# Patient Record
Sex: MALE | Race: WHITE | Employment: OTHER | URBAN - METROPOLITAN AREA
[De-identification: names, ages, dates, MRNs, and addresses within clinical notes are randomized per-mention and may not be internally consistent; named-entity substitution may affect disease eponyms.]

---

## 2017-05-26 PROBLEM — M54.50 ACUTE LOW BACK PAIN WITHOUT SCIATICA: Status: ACTIVE | Noted: 2017-05-26

## 2017-06-06 ENCOUNTER — TELEPHONE (OUTPATIENT)
Dept: WOUND CARE | Age: 77
End: 2017-06-06

## 2017-08-16 ENCOUNTER — HOSPITAL ENCOUNTER (OUTPATIENT)
Dept: SURGERY | Age: 77
Discharge: HOME | End: 2017-08-16
Attending: INTERNAL MEDICINE | Admitting: INTERNAL MEDICINE

## 2017-08-16 VITALS
TEMPERATURE: 98.4 F | BODY MASS INDEX: 31.15 KG/M2 | OXYGEN SATURATION: 95 % | SYSTOLIC BLOOD PRESSURE: 114 MMHG | RESPIRATION RATE: 20 BRPM | HEIGHT: 72 IN | DIASTOLIC BLOOD PRESSURE: 75 MMHG | WEIGHT: 230 LBS | HEART RATE: 82 BPM

## 2017-08-16 RX ORDER — ASPIRIN 325 MG
325 TABLET ORAL NIGHTLY
Status: ON HOLD | COMMUNITY
End: 2020-05-06 | Stop reason: HOSPADM

## 2017-08-16 RX ORDER — NIACIN 500 MG
250 TABLET ORAL DAILY
COMMUNITY
End: 2019-01-03 | Stop reason: CLARIF

## 2017-08-16 RX ORDER — M-VIT,TX,IRON,MINS/CALC/FOLIC 27MG-0.4MG
1 TABLET ORAL DAILY
Status: ON HOLD | COMMUNITY
End: 2019-10-09 | Stop reason: HOSPADM

## 2017-08-24 ENCOUNTER — OFFICE VISIT (OUTPATIENT)
Dept: CARDIOLOGY CLINIC | Age: 77
End: 2017-08-24

## 2017-08-24 VITALS
BODY MASS INDEX: 31.63 KG/M2 | SYSTOLIC BLOOD PRESSURE: 114 MMHG | DIASTOLIC BLOOD PRESSURE: 88 MMHG | WEIGHT: 230 LBS | HEART RATE: 60 BPM

## 2017-08-24 DIAGNOSIS — Z95.1 S/P CABG X 5: ICD-10-CM

## 2017-08-24 DIAGNOSIS — I10 ESSENTIAL HYPERTENSION: Primary | Chronic | ICD-10-CM

## 2017-08-24 DIAGNOSIS — M25.551 ACUTE RIGHT HIP PAIN: ICD-10-CM

## 2017-08-24 PROCEDURE — 99214 OFFICE O/P EST MOD 30 MIN: CPT | Performed by: INTERNAL MEDICINE

## 2018-04-26 ENCOUNTER — OFFICE VISIT (OUTPATIENT)
Dept: CARDIOLOGY CLINIC | Age: 78
End: 2018-04-26

## 2018-04-26 VITALS
WEIGHT: 217 LBS | SYSTOLIC BLOOD PRESSURE: 120 MMHG | DIASTOLIC BLOOD PRESSURE: 70 MMHG | HEART RATE: 80 BPM | BODY MASS INDEX: 29.84 KG/M2

## 2018-04-26 DIAGNOSIS — Z95.1 S/P CABG X 5: ICD-10-CM

## 2018-04-26 DIAGNOSIS — I10 ESSENTIAL HYPERTENSION: Primary | Chronic | ICD-10-CM

## 2018-04-26 DIAGNOSIS — I25.10 CORONARY ARTERY DISEASE INVOLVING NATIVE CORONARY ARTERY WITHOUT ANGINA PECTORIS, UNSPECIFIED WHETHER NATIVE OR TRANSPLANTED HEART: ICD-10-CM

## 2018-04-26 PROCEDURE — 99213 OFFICE O/P EST LOW 20 MIN: CPT | Performed by: INTERNAL MEDICINE

## 2018-04-26 RX ORDER — ISOSORBIDE MONONITRATE 30 MG/1
30 TABLET, EXTENDED RELEASE ORAL DAILY
Qty: 30 TABLET | Refills: 3 | Status: SHIPPED | OUTPATIENT
Start: 2018-04-26 | End: 2018-07-03 | Stop reason: SDUPTHER

## 2018-07-03 DIAGNOSIS — Z95.1 S/P CABG X 5: ICD-10-CM

## 2018-07-03 DIAGNOSIS — I25.10 CORONARY ARTERY DISEASE INVOLVING NATIVE CORONARY ARTERY WITHOUT ANGINA PECTORIS, UNSPECIFIED WHETHER NATIVE OR TRANSPLANTED HEART: ICD-10-CM

## 2018-07-03 DIAGNOSIS — I10 ESSENTIAL HYPERTENSION: Chronic | ICD-10-CM

## 2018-07-05 RX ORDER — ISOSORBIDE MONONITRATE 30 MG/1
TABLET, EXTENDED RELEASE ORAL
Qty: 90 TABLET | Refills: 2 | Status: ON HOLD | OUTPATIENT
Start: 2018-07-05 | End: 2019-01-09

## 2018-08-24 ENCOUNTER — HOSPITAL ENCOUNTER (EMERGENCY)
Age: 78
Discharge: HOME | End: 2018-08-24
Attending: EMERGENCY MEDICINE
Payer: MEDICARE

## 2018-08-24 VITALS
SYSTOLIC BLOOD PRESSURE: 148 MMHG | WEIGHT: 203.8 LBS | DIASTOLIC BLOOD PRESSURE: 77 MMHG | RESPIRATION RATE: 15 BRPM | TEMPERATURE: 98 F | HEART RATE: 57 BPM | BODY MASS INDEX: 27.6 KG/M2 | HEIGHT: 72 IN | OXYGEN SATURATION: 97 %

## 2018-08-24 DIAGNOSIS — K59.00 CONSTIPATION, UNSPECIFIED CONSTIPATION TYPE: Primary | ICD-10-CM

## 2018-08-24 LAB
ANION GAP SERPL CALCULATED.3IONS-SCNC: 10 MMOL/L (ref 3–16)
BUN BLDV-MCNC: 18 MG/DL (ref 7–20)
CALCIUM SERPL-MCNC: 9 MG/DL (ref 8.3–10.6)
CHLORIDE BLD-SCNC: 108 MMOL/L (ref 99–110)
CO2: 27 MMOL/L (ref 21–32)
CREAT SERPL-MCNC: 0.9 MG/DL (ref 0.8–1.3)
GFR AFRICAN AMERICAN: >60
GFR NON-AFRICAN AMERICAN: >60
GLUCOSE BLD-MCNC: 113 MG/DL (ref 70–99)
POTASSIUM SERPL-SCNC: 4.4 MMOL/L (ref 3.5–5.1)
SODIUM BLD-SCNC: 145 MMOL/L (ref 136–145)

## 2018-08-24 PROCEDURE — 80048 BASIC METABOLIC PNL TOTAL CA: CPT

## 2018-08-24 PROCEDURE — 2500000003 HC RX 250 WO HCPCS: Performed by: EMERGENCY MEDICINE

## 2018-08-24 PROCEDURE — 99284 EMERGENCY DEPT VISIT MOD MDM: CPT

## 2018-08-24 RX ORDER — POLYETHYLENE GLYCOL 3350 17 G/17G
17 POWDER, FOR SOLUTION ORAL 3 TIMES DAILY
Qty: 1530 G | Refills: 0 | Status: SHIPPED | OUTPATIENT
Start: 2018-08-24 | End: 2018-09-23

## 2018-08-24 RX ADMIN — Medication 960 ML: at 19:46

## 2018-08-24 ASSESSMENT — ENCOUNTER SYMPTOMS
COUGH: 0
SHORTNESS OF BREATH: 0
DIARRHEA: 0
VOMITING: 0
CONSTIPATION: 1
NAUSEA: 0
RHINORRHEA: 0
BACK PAIN: 0
ABDOMINAL PAIN: 1

## 2018-08-24 ASSESSMENT — PAIN DESCRIPTION - PAIN TYPE: TYPE: ACUTE PAIN

## 2018-08-24 ASSESSMENT — PAIN SCALES - GENERAL: PAINLEVEL_OUTOF10: 3

## 2018-08-24 ASSESSMENT — PAIN DESCRIPTION - LOCATION: LOCATION: ABDOMEN

## 2018-08-24 NOTE — ED PROVIDER NOTES
rhythm, normal heart sounds and intact distal pulses. Exam reveals no gallop and no friction rub. No murmur heard. Pulmonary/Chest: Effort normal and breath sounds normal. No respiratory distress. He has no wheezes. He has no rales. Abdominal: Soft. He exhibits no distension. There is no tenderness. There is no rebound and no guarding. R baclofen pump in place   Musculoskeletal: Normal range of motion. He exhibits no edema. Neurological: He is alert and oriented to person, place, and time. No cranial nerve deficit. Skin: Skin is warm. No rash noted. He is not diaphoretic. Psychiatric: He has a normal mood and affect. His behavior is normal.   Nursing note and vitals reviewed. Diagnostic Results     EKG   N/A    RADIOLOGY:  No orders to display       LABS:   Results for orders placed or performed during the hospital encounter of 42/66/80   Basic Metabolic Panel   Result Value Ref Range    Sodium 145 136 - 145 mmol/L    Potassium 4.4 3.5 - 5.1 mmol/L    Chloride 108 99 - 110 mmol/L    CO2 27 21 - 32 mmol/L    Anion Gap 10 3 - 16    Glucose 113 (H) 70 - 99 mg/dL    BUN 18 7 - 20 mg/dL    CREATININE 0.9 0.8 - 1.3 mg/dL    GFR Non-African American >60 >60    GFR African American >60 >60    Calcium 9.0 8.3 - 10.6 mg/dL       ED BEDSIDE ULTRASOUND:  N/A    RECENT VITALS:  BP: (!) 148/77, Temp: 98 °F (36.7 °C), Pulse: 57, Resp: 15, SpO2: 97 %     Procedures     N/A    MEDICAL DECISION MAKING / ASSESSMENT / Linda Nadege is a 69 y.o. male with a history of CAD s/p CABG, HTN, HL, and B12 deficiency c/b spasticity s/p baclofen pump placement who presents with constipation. Symptoms and exam most consistent with constipation 2/2 known spacticity. Doubt SBO (denies prior surgery, minimal pain, no N/V). Doubt electrolyte abnormality. Plan: BMP, enema    ED Course     Nursing Notes, Past Medical Hx, Past Surgical Hx, Social Hx, Allergies, and Family Hx were reviewed.     The patient was given the

## 2018-08-24 NOTE — ED NOTES
Bed: B23-23  Expected date:   Expected time:   Means of arrival:   Comments:  [id number] BUNNY Matt RN  08/24/18 0120

## 2018-08-24 NOTE — ED TRIAGE NOTES
Pt presents to ED with c/o no BM for 8 days and abdominal swelling. Dressed in gown, VSS. Pt states that he has taken Miralax at home with no relief.

## 2018-08-25 NOTE — ED NOTES
Pt tolerated enema administration well. Pt now passing gas, bowel sounds hyperactive. No BM noted at this time.      Surekha Chaney RN  08/24/18 2006

## 2018-08-25 NOTE — ED NOTES
Patient prepared for and ready to be discharged. Dressed in clothes and given belongings. Patient discharged at this time in no acute distress after he verbalized understanding of discharge instructions. Reviewed medications, and when to return to the ED with patient. Encouraged follow up with PCP as needed  Patient wheeled to lobby, Family to take patient home.        Denis Benton RN  08/24/18 [id number]

## 2018-08-25 NOTE — ED NOTES
Pt's daughter here to take patient home. Pt cleaned and dressed. [facility] to call [facility] to cancel transport.      Cj Sotelo RN  08/24/18 5276

## 2018-11-06 ENCOUNTER — HOSPITAL ENCOUNTER (EMERGENCY)
Age: 78
Discharge: HOME | End: 2018-11-06
Attending: EMERGENCY MEDICINE
Payer: MEDICARE

## 2018-11-06 VITALS
RESPIRATION RATE: 18 BRPM | SYSTOLIC BLOOD PRESSURE: 135 MMHG | DIASTOLIC BLOOD PRESSURE: 77 MMHG | WEIGHT: 204 LBS | HEART RATE: 51 BPM | TEMPERATURE: 98.3 F | BODY MASS INDEX: 28.06 KG/M2 | OXYGEN SATURATION: 98 %

## 2018-11-06 DIAGNOSIS — R10.9 ABDOMINAL PAIN, UNSPECIFIED ABDOMINAL LOCATION: Primary | ICD-10-CM

## 2018-11-06 LAB
ALBUMIN SERPL-MCNC: 3.4 G/DL (ref 3.4–5)
ALP BLD-CCNC: 126 U/L (ref 40–129)
ALT SERPL-CCNC: 15 U/L (ref 10–40)
ANION GAP SERPL CALCULATED.3IONS-SCNC: 10 MMOL/L (ref 3–16)
AST SERPL-CCNC: 23 U/L (ref 15–37)
BASOPHILS ABSOLUTE: 0.1 K/UL (ref 0–0.2)
BASOPHILS RELATIVE PERCENT: 1.1 %
BILIRUB SERPL-MCNC: 0.7 MG/DL (ref 0–1)
BILIRUBIN DIRECT: <0.2 MG/DL (ref 0–0.3)
BILIRUBIN URINE: NEGATIVE MG/DL
BILIRUBIN, INDIRECT: NORMAL MG/DL (ref 0–1)
BLOOD, URINE: NEGATIVE
BUN BLDV-MCNC: 26 MG/DL (ref 7–20)
CALCIUM SERPL-MCNC: 9.2 MG/DL (ref 8.3–10.6)
CHLORIDE BLD-SCNC: 106 MMOL/L (ref 99–110)
CLARITY: ABNORMAL
CO2: 28 MMOL/L (ref 21–32)
COLOR: ABNORMAL
CREAT SERPL-MCNC: 0.9 MG/DL (ref 0.8–1.3)
EOSINOPHILS ABSOLUTE: 0.3 K/UL (ref 0–0.6)
EOSINOPHILS RELATIVE PERCENT: 5.7 %
GFR AFRICAN AMERICAN: >60
GFR NON-AFRICAN AMERICAN: >60
GLUCOSE BLD-MCNC: 96 MG/DL (ref 70–99)
GLUCOSE URINE: NEGATIVE MG/DL
HCT VFR BLD CALC: 42.1 % (ref 40.5–52.5)
HEMOGLOBIN: 13.8 G/DL (ref 13.5–17.5)
KETONES, URINE: NEGATIVE MG/DL
LEUKOCYTE ESTERASE, URINE: NEGATIVE
LIPASE: 35 U/L (ref 13–60)
LYMPHOCYTES ABSOLUTE: 1.1 K/UL (ref 1–5.1)
LYMPHOCYTES RELATIVE PERCENT: 20.6 %
MCH RBC QN AUTO: 32.5 PG (ref 26–34)
MCHC RBC AUTO-ENTMCNC: 32.7 G/DL (ref 31–36)
MCV RBC AUTO: 99.3 FL (ref 80–100)
MICROSCOPIC EXAMINATION: ABNORMAL
MONOCYTES ABSOLUTE: 0.4 K/UL (ref 0–1.3)
MONOCYTES RELATIVE PERCENT: 7.6 %
NEUTROPHILS ABSOLUTE: 3.6 K/UL (ref 1.7–7.7)
NEUTROPHILS RELATIVE PERCENT: 65 %
NITRITE, URINE: NEGATIVE
PDW BLD-RTO: 14.8 % (ref 12.4–15.4)
PH UA: 7.5
PLATELET # BLD: 142 K/UL (ref 135–450)
PMV BLD AUTO: 10.2 FL (ref 5–10.5)
POC OCCULT BLOOD STOOL: NEGATIVE
POTASSIUM REFLEX MAGNESIUM: 4.5 MMOL/L (ref 3.5–5.1)
PROTEIN UA: NEGATIVE MG/DL
RBC # BLD: 4.24 M/UL (ref 4.2–5.9)
SODIUM BLD-SCNC: 144 MMOL/L (ref 136–145)
SPECIFIC GRAVITY UA: 1.02
TOTAL PROTEIN: 7.1 G/DL (ref 6.4–8.2)
UROBILINOGEN, URINE: 4 E.U./DL
WBC # BLD: 5.5 K/UL (ref 4–11)

## 2018-11-06 PROCEDURE — 80076 HEPATIC FUNCTION PANEL: CPT

## 2018-11-06 PROCEDURE — 81003 URINALYSIS AUTO W/O SCOPE: CPT

## 2018-11-06 PROCEDURE — 85025 COMPLETE CBC W/AUTO DIFF WBC: CPT

## 2018-11-06 PROCEDURE — 99284 EMERGENCY DEPT VISIT MOD MDM: CPT

## 2018-11-06 PROCEDURE — 80048 BASIC METABOLIC PNL TOTAL CA: CPT

## 2018-11-06 PROCEDURE — 83690 ASSAY OF LIPASE: CPT

## 2018-11-06 PROCEDURE — 82272 OCCULT BLD FECES 1-3 TESTS: CPT

## 2018-11-06 ASSESSMENT — ENCOUNTER SYMPTOMS
NAUSEA: 0
ABDOMINAL DISTENTION: 1
ABDOMINAL PAIN: 1
DIARRHEA: 0
VOMITING: 0
CONSTIPATION: 0
SHORTNESS OF BREATH: 0

## 2018-11-06 ASSESSMENT — PAIN DESCRIPTION - LOCATION: LOCATION: ABDOMEN

## 2018-11-06 ASSESSMENT — PAIN DESCRIPTION - PAIN TYPE: TYPE: ACUTE PAIN

## 2018-11-06 ASSESSMENT — PAIN SCALES - GENERAL: PAINLEVEL_OUTOF10: 3

## 2019-01-01 ENCOUNTER — HOSPITAL ENCOUNTER (EMERGENCY)
Age: 79
Discharge: HOME | DRG: 690 | End: 2019-01-01
Attending: EMERGENCY MEDICINE
Payer: MEDICARE

## 2019-01-01 VITALS
SYSTOLIC BLOOD PRESSURE: 155 MMHG | TEMPERATURE: 97.8 F | HEART RATE: 74 BPM | DIASTOLIC BLOOD PRESSURE: 78 MMHG | HEIGHT: 71 IN | BODY MASS INDEX: 28.56 KG/M2 | WEIGHT: 204 LBS | OXYGEN SATURATION: 96 % | RESPIRATION RATE: 16 BRPM

## 2019-01-01 DIAGNOSIS — R31.0 GROSS HEMATURIA: Primary | ICD-10-CM

## 2019-01-01 LAB
BASOPHILS ABSOLUTE: 0 K/UL (ref 0–0.2)
BASOPHILS RELATIVE PERCENT: 0.3 %
BILIRUBIN URINE: ABNORMAL
BLOOD, URINE: ABNORMAL
CALCIUM IONIZED: 1.1 MMOL/L (ref 1.12–1.32)
CLARITY: ABNORMAL
CO2: 27 MMOL/L (ref 21–32)
COLOR: ABNORMAL
EOSINOPHILS ABSOLUTE: 0.3 K/UL (ref 0–0.6)
EOSINOPHILS RELATIVE PERCENT: 2.4 %
GFR AFRICAN AMERICAN: >60
GFR NON-AFRICAN AMERICAN: >60
GLUCOSE BLD-MCNC: 100 MG/DL (ref 70–99)
GLUCOSE URINE: ABNORMAL MG/DL
HCT VFR BLD CALC: 44.5 % (ref 40.5–52.5)
HEMOGLOBIN: 14.6 G/DL (ref 13.5–17.5)
INR BLD: 1.04 (ref 0.86–1.14)
KETONES, URINE: ABNORMAL MG/DL
LEUKOCYTE ESTERASE, URINE: ABNORMAL
LYMPHOCYTES ABSOLUTE: 1 K/UL (ref 1–5.1)
LYMPHOCYTES RELATIVE PERCENT: 9.9 %
MCH RBC QN AUTO: 32.6 PG (ref 26–34)
MCHC RBC AUTO-ENTMCNC: 32.7 G/DL (ref 31–36)
MCV RBC AUTO: 99.5 FL (ref 80–100)
MICROSCOPIC EXAMINATION: YES
MONOCYTES ABSOLUTE: 0.7 K/UL (ref 0–1.3)
MONOCYTES RELATIVE PERCENT: 6.6 %
NEUTROPHILS ABSOLUTE: 8.4 K/UL (ref 1.7–7.7)
NEUTROPHILS RELATIVE PERCENT: 80.8 %
NITRITE, URINE: ABNORMAL
PDW BLD-RTO: 14.2 % (ref 12.4–15.4)
PERFORMED ON: ABNORMAL
PH UA: ABNORMAL
PLATELET # BLD: 164 K/UL (ref 135–450)
PMV BLD AUTO: 9.3 FL (ref 5–10.5)
POC ANION GAP: 12 (ref 10–20)
POC BUN: 34 MG/DL (ref 7–18)
POC CHLORIDE: 101 MMOL/L (ref 99–110)
POC CREATININE: 1 MG/DL (ref 0.8–1.3)
POC POTASSIUM: 4.8 MMOL/L (ref 3.5–5.1)
POC SAMPLE TYPE: ABNORMAL
POC SODIUM: 140 MMOL/L (ref 136–145)
PROTEIN UA: ABNORMAL MG/DL
PROTHROMBIN TIME: 11.9 SEC (ref 9.8–13)
RBC # BLD: 4.47 M/UL (ref 4.2–5.9)
RBC UA: >100 /HPF (ref 0–2)
SPECIFIC GRAVITY UA: >=1.03
URINE TYPE: ABNORMAL
UROBILINOGEN, URINE: ABNORMAL E.U./DL
WBC # BLD: 10.4 K/UL (ref 4–11)
WBC UA: ABNORMAL /HPF (ref 0–5)

## 2019-01-01 PROCEDURE — 87077 CULTURE AEROBIC IDENTIFY: CPT

## 2019-01-01 PROCEDURE — 87086 URINE CULTURE/COLONY COUNT: CPT

## 2019-01-01 PROCEDURE — 87186 SC STD MICRODIL/AGAR DIL: CPT

## 2019-01-01 PROCEDURE — 80047 BASIC METABLC PNL IONIZED CA: CPT

## 2019-01-01 PROCEDURE — 6370000000 HC RX 637 (ALT 250 FOR IP): Performed by: EMERGENCY MEDICINE

## 2019-01-01 PROCEDURE — 85025 COMPLETE CBC W/AUTO DIFF WBC: CPT

## 2019-01-01 PROCEDURE — 81001 URINALYSIS AUTO W/SCOPE: CPT

## 2019-01-01 PROCEDURE — 85610 PROTHROMBIN TIME: CPT

## 2019-01-01 PROCEDURE — 99284 EMERGENCY DEPT VISIT MOD MDM: CPT

## 2019-01-01 RX ORDER — CEPHALEXIN 500 MG/1
500 CAPSULE ORAL 2 TIMES DAILY
Qty: 14 CAPSULE | Refills: 0 | Status: SHIPPED | OUTPATIENT
Start: 2019-01-01 | End: 2019-01-03 | Stop reason: CLARIF

## 2019-01-01 RX ORDER — CEPHALEXIN 500 MG/1
500 CAPSULE ORAL 2 TIMES DAILY
Qty: 14 CAPSULE | Refills: 0 | Status: SHIPPED | OUTPATIENT
Start: 2019-01-01 | End: 2019-01-01

## 2019-01-01 RX ORDER — CEPHALEXIN 500 MG/1
500 CAPSULE ORAL ONCE
Status: COMPLETED | OUTPATIENT
Start: 2019-01-01 | End: 2019-01-01

## 2019-01-01 RX ADMIN — CEPHALEXIN 500 MG: 500 CAPSULE ORAL at 03:41

## 2019-01-01 ASSESSMENT — ENCOUNTER SYMPTOMS
VOMITING: 0
EYES NEGATIVE: 1
SHORTNESS OF BREATH: 0
DIARRHEA: 0
ALLERGIC/IMMUNOLOGIC NEGATIVE: 1
BACK PAIN: 0
NAUSEA: 0
ABDOMINAL PAIN: 0
WHEEZING: 0

## 2019-01-03 ENCOUNTER — HOSPITAL ENCOUNTER (INPATIENT)
Age: 79
LOS: 8 days | Discharge: NURSING, REHAB OR LONG-TERM CARE | DRG: 690 | End: 2019-01-11
Attending: EMERGENCY MEDICINE | Admitting: INTERNAL MEDICINE
Payer: MEDICARE

## 2019-01-03 ENCOUNTER — APPOINTMENT (OUTPATIENT)
Dept: GENERAL RADIOLOGY | Age: 79
DRG: 690 | End: 2019-01-03
Payer: MEDICARE

## 2019-01-03 DIAGNOSIS — N39.0 URINARY TRACT INFECTION WITH HEMATURIA, SITE UNSPECIFIED: ICD-10-CM

## 2019-01-03 DIAGNOSIS — F51.01 PRIMARY INSOMNIA: ICD-10-CM

## 2019-01-03 DIAGNOSIS — I10 ESSENTIAL HYPERTENSION: Chronic | ICD-10-CM

## 2019-01-03 DIAGNOSIS — Z95.1 S/P CABG X 5: ICD-10-CM

## 2019-01-03 DIAGNOSIS — R31.9 URINARY TRACT INFECTION WITH HEMATURIA, SITE UNSPECIFIED: ICD-10-CM

## 2019-01-03 DIAGNOSIS — T68.XXXA HYPOTHERMIA, INITIAL ENCOUNTER: Primary | ICD-10-CM

## 2019-01-03 DIAGNOSIS — I25.10 CORONARY ARTERY DISEASE INVOLVING NATIVE CORONARY ARTERY WITHOUT ANGINA PECTORIS, UNSPECIFIED WHETHER NATIVE OR TRANSPLANTED HEART: ICD-10-CM

## 2019-01-03 LAB
ANION GAP SERPL CALCULATED.3IONS-SCNC: 12 MMOL/L (ref 3–16)
ATYPICAL LYMPHOCYTE RELATIVE PERCENT: 1 % (ref 0–6)
BACTERIA: ABNORMAL /HPF
BANDED NEUTROPHILS RELATIVE PERCENT: 2 % (ref 0–7)
BASE EXCESS VENOUS: -2 (ref -3–3)
BASOPHILS ABSOLUTE: 0 K/UL (ref 0–0.2)
BASOPHILS RELATIVE PERCENT: 0 %
BILIRUBIN URINE: ABNORMAL
BLOOD, URINE: ABNORMAL
BUN BLDV-MCNC: 44 MG/DL (ref 7–20)
CALCIUM SERPL-MCNC: 9.6 MG/DL (ref 8.3–10.6)
CHLORIDE BLD-SCNC: 99 MMOL/L (ref 99–110)
CLARITY: ABNORMAL
CO2: 24 MMOL/L (ref 21–32)
COLOR: ABNORMAL
CREAT SERPL-MCNC: 1.4 MG/DL (ref 0.8–1.3)
CRENATED RBC'S: ABNORMAL
EKG ATRIAL RATE: 61 BPM
EKG DIAGNOSIS: NORMAL
EKG P AXIS: 67 DEGREES
EKG P-R INTERVAL: 226 MS
EKG Q-T INTERVAL: 458 MS
EKG QRS DURATION: 98 MS
EKG QTC CALCULATION (BAZETT): 461 MS
EKG R AXIS: 32 DEGREES
EKG T AXIS: 50 DEGREES
EKG VENTRICULAR RATE: 61 BPM
EOSINOPHILS ABSOLUTE: 0 K/UL (ref 0–0.6)
EOSINOPHILS RELATIVE PERCENT: 0 %
GFR AFRICAN AMERICAN: 59
GFR NON-AFRICAN AMERICAN: 49
GLUCOSE BLD-MCNC: 102 MG/DL (ref 70–99)
GLUCOSE URINE: NEGATIVE MG/DL
HCO3 VENOUS: 24.8 MMOL/L (ref 23–29)
HCT VFR BLD CALC: 42.9 % (ref 40.5–52.5)
HEMOGLOBIN: 14.2 G/DL (ref 13.5–17.5)
KETONES, URINE: ABNORMAL MG/DL
LACTATE: 1.1 MMOL/L (ref 0.4–2)
LEUKOCYTE ESTERASE, URINE: ABNORMAL
LYMPHOCYTES ABSOLUTE: 0.1 K/UL (ref 1–5.1)
LYMPHOCYTES RELATIVE PERCENT: 0 %
MCH RBC QN AUTO: 32.7 PG (ref 26–34)
MCHC RBC AUTO-ENTMCNC: 33.1 G/DL (ref 31–36)
MCV RBC AUTO: 98.7 FL (ref 80–100)
MICROSCOPIC EXAMINATION: YES
MONOCYTES ABSOLUTE: 0.7 K/UL (ref 0–1.3)
MONOCYTES RELATIVE PERCENT: 5 %
MUCUS: ABNORMAL /LPF
MYELOCYTE PERCENT: 1 %
NEUTROPHILS ABSOLUTE: 12.9 K/UL (ref 1.7–7.7)
NEUTROPHILS RELATIVE PERCENT: 91 %
NITRITE, URINE: POSITIVE
O2 SAT, VEN: 42 %
ORGANISM: ABNORMAL
PCO2, VEN: 51.6 MM HG (ref 40–50)
PDW BLD-RTO: 13.9 % (ref 12.4–15.4)
PERFORMED ON: ABNORMAL
PH UA: >=9
PH VENOUS: 7.29 (ref 7.35–7.45)
PLATELET # BLD: 116 K/UL (ref 135–450)
PMV BLD AUTO: 9.3 FL (ref 5–10.5)
PO2, VEN: 27 MM HG
POC SAMPLE TYPE: ABNORMAL
POTASSIUM REFLEX MAGNESIUM: 4.2 MMOL/L (ref 3.5–5.1)
PROTEIN UA: >=300 MG/DL
RBC # BLD: 4.34 M/UL (ref 4.2–5.9)
RBC UA: >100 /HPF (ref 0–2)
SODIUM BLD-SCNC: 135 MMOL/L (ref 136–145)
SPECIFIC GRAVITY UA: 1.01
TCO2 CALC VENOUS: 26 MMOL/L
TOTAL CK: 730 U/L (ref 39–308)
URINE CULTURE, ROUTINE: ABNORMAL
URINE CULTURE, ROUTINE: ABNORMAL
URINE TYPE: ABNORMAL
UROBILINOGEN, URINE: 1 E.U./DL
WBC # BLD: 13.7 K/UL (ref 4–11)
WBC UA: >100 /HPF (ref 0–5)

## 2019-01-03 PROCEDURE — 96365 THER/PROPH/DIAG IV INF INIT: CPT

## 2019-01-03 PROCEDURE — 1200000000 HC SEMI PRIVATE

## 2019-01-03 PROCEDURE — 87040 BLOOD CULTURE FOR BACTERIA: CPT

## 2019-01-03 PROCEDURE — 2580000003 HC RX 258: Performed by: STUDENT IN AN ORGANIZED HEALTH CARE EDUCATION/TRAINING PROGRAM

## 2019-01-03 PROCEDURE — 93005 ELECTROCARDIOGRAM TRACING: CPT | Performed by: EMERGENCY MEDICINE

## 2019-01-03 PROCEDURE — 85025 COMPLETE CBC W/AUTO DIFF WBC: CPT

## 2019-01-03 PROCEDURE — 84153 ASSAY OF PSA TOTAL: CPT

## 2019-01-03 PROCEDURE — 84436 ASSAY OF TOTAL THYROXINE: CPT

## 2019-01-03 PROCEDURE — 80048 BASIC METABOLIC PNL TOTAL CA: CPT

## 2019-01-03 PROCEDURE — 84443 ASSAY THYROID STIM HORMONE: CPT

## 2019-01-03 PROCEDURE — 87186 SC STD MICRODIL/AGAR DIL: CPT

## 2019-01-03 PROCEDURE — 71045 X-RAY EXAM CHEST 1 VIEW: CPT

## 2019-01-03 PROCEDURE — 87086 URINE CULTURE/COLONY COUNT: CPT

## 2019-01-03 PROCEDURE — 82550 ASSAY OF CK (CPK): CPT

## 2019-01-03 PROCEDURE — 36415 COLL VENOUS BLD VENIPUNCTURE: CPT

## 2019-01-03 PROCEDURE — 81001 URINALYSIS AUTO W/SCOPE: CPT

## 2019-01-03 PROCEDURE — 96375 TX/PRO/DX INJ NEW DRUG ADDON: CPT

## 2019-01-03 PROCEDURE — 6360000002 HC RX W HCPCS: Performed by: STUDENT IN AN ORGANIZED HEALTH CARE EDUCATION/TRAINING PROGRAM

## 2019-01-03 PROCEDURE — 82803 BLOOD GASES ANY COMBINATION: CPT

## 2019-01-03 PROCEDURE — 6370000000 HC RX 637 (ALT 250 FOR IP): Performed by: STUDENT IN AN ORGANIZED HEALTH CARE EDUCATION/TRAINING PROGRAM

## 2019-01-03 PROCEDURE — 99285 EMERGENCY DEPT VISIT HI MDM: CPT

## 2019-01-03 PROCEDURE — 87077 CULTURE AEROBIC IDENTIFY: CPT

## 2019-01-03 PROCEDURE — 83605 ASSAY OF LACTIC ACID: CPT

## 2019-01-03 RX ORDER — GABAPENTIN 400 MG/1
400 CAPSULE ORAL
Status: DISCONTINUED | OUTPATIENT
Start: 2019-01-04 | End: 2019-01-11 | Stop reason: HOSPADM

## 2019-01-03 RX ORDER — 0.9 % SODIUM CHLORIDE 0.9 %
1000 INTRAVENOUS SOLUTION INTRAVENOUS ONCE
Status: COMPLETED | OUTPATIENT
Start: 2019-01-03 | End: 2019-01-03

## 2019-01-03 RX ORDER — BISMUTH SUBSALICYLATE 262 MG/1
1 TABLET, CHEWABLE ORAL EVERY 6 HOURS PRN
Status: DISCONTINUED | OUTPATIENT
Start: 2019-01-03 | End: 2019-01-11 | Stop reason: HOSPADM

## 2019-01-03 RX ORDER — SODIUM CHLORIDE 0.9 % (FLUSH) 0.9 %
10 SYRINGE (ML) INJECTION PRN
Status: DISCONTINUED | OUTPATIENT
Start: 2019-01-03 | End: 2019-01-11 | Stop reason: HOSPADM

## 2019-01-03 RX ORDER — FUROSEMIDE 20 MG/1
20 TABLET ORAL DAILY
COMMUNITY
End: 2019-12-17 | Stop reason: CLARIF

## 2019-01-03 RX ORDER — M-VIT,TX,IRON,MINS/CALC/FOLIC 27MG-0.4MG
1 TABLET ORAL DAILY
Status: DISCONTINUED | OUTPATIENT
Start: 2019-01-04 | End: 2019-01-11 | Stop reason: HOSPADM

## 2019-01-03 RX ORDER — PANTOPRAZOLE SODIUM 40 MG/1
40 GRANULE, DELAYED RELEASE ORAL
COMMUNITY
End: 2019-12-17 | Stop reason: CLARIF

## 2019-01-03 RX ORDER — ONDANSETRON 2 MG/ML
4 INJECTION INTRAMUSCULAR; INTRAVENOUS EVERY 6 HOURS PRN
Status: DISCONTINUED | OUTPATIENT
Start: 2019-01-03 | End: 2019-01-11 | Stop reason: HOSPADM

## 2019-01-03 RX ORDER — SENNA AND DOCUSATE SODIUM 50; 8.6 MG/1; MG/1
1 TABLET, FILM COATED ORAL DAILY PRN
COMMUNITY
End: 2019-12-17 | Stop reason: CLARIF

## 2019-01-03 RX ORDER — ONDANSETRON 4 MG/1
4 TABLET, ORALLY DISINTEGRATING ORAL EVERY 8 HOURS PRN
Status: DISCONTINUED | OUTPATIENT
Start: 2019-01-03 | End: 2019-01-03

## 2019-01-03 RX ORDER — GABAPENTIN 600 MG/1
600 TABLET ORAL NIGHTLY
Status: DISCONTINUED | OUTPATIENT
Start: 2019-01-03 | End: 2019-01-11 | Stop reason: HOSPADM

## 2019-01-03 RX ORDER — FERROUS SULFATE 325(65) MG
325 TABLET ORAL
Status: DISCONTINUED | OUTPATIENT
Start: 2019-01-04 | End: 2019-01-11 | Stop reason: HOSPADM

## 2019-01-03 RX ORDER — ONDANSETRON 4 MG/1
4 TABLET, ORALLY DISINTEGRATING ORAL EVERY 6 HOURS PRN
Status: DISCONTINUED | OUTPATIENT
Start: 2019-01-03 | End: 2019-01-11 | Stop reason: HOSPADM

## 2019-01-03 RX ORDER — FERROUS SULFATE 325(65) MG
325 TABLET ORAL
Status: ON HOLD | COMMUNITY
End: 2020-06-11 | Stop reason: HOSPADM

## 2019-01-03 RX ORDER — SENNA AND DOCUSATE SODIUM 50; 8.6 MG/1; MG/1
1 TABLET, FILM COATED ORAL DAILY PRN
Status: DISCONTINUED | OUTPATIENT
Start: 2019-01-03 | End: 2019-01-11 | Stop reason: HOSPADM

## 2019-01-03 RX ORDER — GABAPENTIN 600 MG/1
1800 TABLET ORAL NIGHTLY
Status: ON HOLD | COMMUNITY
End: 2020-02-13 | Stop reason: HOSPADM

## 2019-01-03 RX ORDER — CHLORAL HYDRATE 500 MG
3000 CAPSULE ORAL DAILY
COMMUNITY
End: 2019-01-03 | Stop reason: CLARIF

## 2019-01-03 RX ORDER — GABAPENTIN 300 MG/1
300 CAPSULE ORAL DAILY
Status: DISCONTINUED | OUTPATIENT
Start: 2019-01-04 | End: 2019-01-11 | Stop reason: HOSPADM

## 2019-01-03 RX ORDER — PHENOL 1.4 %
10 AEROSOL, SPRAY (ML) MUCOUS MEMBRANE NIGHTLY PRN
COMMUNITY
End: 2022-04-22

## 2019-01-03 RX ORDER — ONDANSETRON 2 MG/ML
4 INJECTION INTRAMUSCULAR; INTRAVENOUS EVERY 6 HOURS PRN
Status: DISCONTINUED | OUTPATIENT
Start: 2019-01-03 | End: 2019-01-03

## 2019-01-03 RX ORDER — UREA 10 %
10 LOTION (ML) TOPICAL NIGHTLY
Status: DISCONTINUED | OUTPATIENT
Start: 2019-01-03 | End: 2019-01-11 | Stop reason: HOSPADM

## 2019-01-03 RX ORDER — FUROSEMIDE 20 MG/1
20 TABLET ORAL DAILY
Status: DISCONTINUED | OUTPATIENT
Start: 2019-01-04 | End: 2019-01-04

## 2019-01-03 RX ORDER — ISOSORBIDE MONONITRATE 30 MG/1
30 TABLET, EXTENDED RELEASE ORAL DAILY
Status: DISCONTINUED | OUTPATIENT
Start: 2019-01-04 | End: 2019-01-04

## 2019-01-03 RX ORDER — NIACIN 500 MG
1 TABLET ORAL DAILY PRN
Status: ON HOLD | COMMUNITY
End: 2020-08-18

## 2019-01-03 RX ORDER — SODIUM CHLORIDE 0.9 % (FLUSH) 0.9 %
10 SYRINGE (ML) INJECTION EVERY 12 HOURS SCHEDULED
Status: DISCONTINUED | OUTPATIENT
Start: 2019-01-03 | End: 2019-01-11 | Stop reason: HOSPADM

## 2019-01-03 RX ORDER — PANTOPRAZOLE SODIUM 40 MG/1
40 GRANULE, DELAYED RELEASE ORAL
Status: DISCONTINUED | OUTPATIENT
Start: 2019-01-04 | End: 2019-01-11 | Stop reason: HOSPADM

## 2019-01-03 RX ORDER — SODIUM CHLORIDE 9 MG/ML
INJECTION, SOLUTION INTRAVENOUS CONTINUOUS
Status: ACTIVE | OUTPATIENT
Start: 2019-01-03 | End: 2019-01-04

## 2019-01-03 RX ORDER — ATORVASTATIN CALCIUM 80 MG/1
80 TABLET, FILM COATED ORAL NIGHTLY
Status: DISCONTINUED | OUTPATIENT
Start: 2019-01-03 | End: 2019-01-11 | Stop reason: HOSPADM

## 2019-01-03 RX ORDER — METOPROLOL SUCCINATE 25 MG/1
12.5 TABLET, EXTENDED RELEASE ORAL DAILY
Status: DISCONTINUED | OUTPATIENT
Start: 2019-01-04 | End: 2019-01-11 | Stop reason: HOSPADM

## 2019-01-03 RX ORDER — GABAPENTIN 600 MG/1
1200 TABLET ORAL
Status: ON HOLD | COMMUNITY
End: 2020-02-13 | Stop reason: HOSPADM

## 2019-01-03 RX ADMIN — SODIUM CHLORIDE 1000 ML: 9 INJECTION, SOLUTION INTRAVENOUS at 18:30

## 2019-01-03 RX ADMIN — Medication 10 MG: at 23:50

## 2019-01-03 RX ADMIN — GABAPENTIN 600 MG: 600 TABLET, FILM COATED ORAL at 23:50

## 2019-01-03 RX ADMIN — CEFTRIAXONE 1 G: 1 INJECTION, POWDER, FOR SOLUTION INTRAMUSCULAR; INTRAVENOUS at 18:31

## 2019-01-03 RX ADMIN — Medication 10 ML: at 23:51

## 2019-01-03 RX ADMIN — HYDROCORTISONE SODIUM SUCCINATE 100 MG: 100 INJECTION, POWDER, FOR SOLUTION INTRAMUSCULAR; INTRAVENOUS at 18:10

## 2019-01-03 RX ADMIN — ATORVASTATIN CALCIUM 80 MG: 80 TABLET, FILM COATED ORAL at 23:50

## 2019-01-03 ASSESSMENT — PAIN DESCRIPTION - PROGRESSION: CLINICAL_PROGRESSION: NOT CHANGED

## 2019-01-03 ASSESSMENT — PAIN DESCRIPTION - ONSET: ONSET: ON-GOING

## 2019-01-03 ASSESSMENT — ENCOUNTER SYMPTOMS
COUGH: 0
VOMITING: 0
SORE THROAT: 0
ABDOMINAL PAIN: 0
NAUSEA: 0
SHORTNESS OF BREATH: 0
EYE PAIN: 0
BACK PAIN: 0

## 2019-01-03 ASSESSMENT — PAIN SCALES - GENERAL: PAINLEVEL_OUTOF10: 7

## 2019-01-03 ASSESSMENT — PAIN DESCRIPTION - FREQUENCY: FREQUENCY: CONTINUOUS

## 2019-01-03 ASSESSMENT — PAIN DESCRIPTION - PAIN TYPE: TYPE: CHRONIC PAIN

## 2019-01-03 ASSESSMENT — PAIN DESCRIPTION - ORIENTATION: ORIENTATION: LOWER

## 2019-01-03 ASSESSMENT — PAIN DESCRIPTION - DESCRIPTORS: DESCRIPTORS: BURNING

## 2019-01-03 ASSESSMENT — PAIN DESCRIPTION - LOCATION: LOCATION: BACK

## 2019-01-04 ENCOUNTER — APPOINTMENT (OUTPATIENT)
Dept: CT IMAGING | Age: 79
DRG: 690 | End: 2019-01-04
Payer: MEDICARE

## 2019-01-04 LAB
ANION GAP SERPL CALCULATED.3IONS-SCNC: 15 MMOL/L (ref 3–16)
BASOPHILS ABSOLUTE: 0 K/UL (ref 0–0.2)
BASOPHILS RELATIVE PERCENT: 0.1 %
BUN BLDV-MCNC: 49 MG/DL (ref 7–20)
CALCIUM SERPL-MCNC: 9.2 MG/DL (ref 8.3–10.6)
CHLORIDE BLD-SCNC: 104 MMOL/L (ref 99–110)
CO2: 21 MMOL/L (ref 21–32)
CREAT SERPL-MCNC: 1.6 MG/DL (ref 0.8–1.3)
EOSINOPHILS ABSOLUTE: 0 K/UL (ref 0–0.6)
EOSINOPHILS RELATIVE PERCENT: 0.2 %
GFR AFRICAN AMERICAN: 51
GFR NON-AFRICAN AMERICAN: 42
GLUCOSE BLD-MCNC: 117 MG/DL (ref 70–99)
HCT VFR BLD CALC: 42.6 % (ref 40.5–52.5)
HEMOGLOBIN: 13.2 G/DL (ref 13.5–17.5)
LYMPHOCYTES ABSOLUTE: 0.7 K/UL (ref 1–5.1)
LYMPHOCYTES RELATIVE PERCENT: 4.8 %
MCH RBC QN AUTO: 33.1 PG (ref 26–34)
MCHC RBC AUTO-ENTMCNC: 31 G/DL (ref 31–36)
MCV RBC AUTO: 106.5 FL (ref 80–100)
MONOCYTES ABSOLUTE: 1.1 K/UL (ref 0–1.3)
MONOCYTES RELATIVE PERCENT: 8.1 %
NEUTROPHILS ABSOLUTE: 12.3 K/UL (ref 1.7–7.7)
NEUTROPHILS RELATIVE PERCENT: 86.8 %
PDW BLD-RTO: 15.2 % (ref 12.4–15.4)
PLATELET # BLD: 132 K/UL (ref 135–450)
PMV BLD AUTO: 9.3 FL (ref 5–10.5)
POTASSIUM REFLEX MAGNESIUM: 4.5 MMOL/L (ref 3.5–5.1)
PROSTATE SPECIFIC ANTIGEN: 13.89 NG/ML (ref 0–4)
RBC # BLD: 4 M/UL (ref 4.2–5.9)
SODIUM BLD-SCNC: 140 MMOL/L (ref 136–145)
T4 TOTAL: 6.9 UG/DL (ref 4.5–10.9)
TSH SERPL DL<=0.05 MIU/L-ACNC: 4.01 UIU/ML (ref 0.27–4.2)
WBC # BLD: 14.2 K/UL (ref 4–11)

## 2019-01-04 PROCEDURE — 6360000002 HC RX W HCPCS: Performed by: STUDENT IN AN ORGANIZED HEALTH CARE EDUCATION/TRAINING PROGRAM

## 2019-01-04 PROCEDURE — 85025 COMPLETE CBC W/AUTO DIFF WBC: CPT

## 2019-01-04 PROCEDURE — G8979 MOBILITY GOAL STATUS: HCPCS

## 2019-01-04 PROCEDURE — 51798 US URINE CAPACITY MEASURE: CPT

## 2019-01-04 PROCEDURE — 1200000000 HC SEMI PRIVATE

## 2019-01-04 PROCEDURE — 97535 SELF CARE MNGMENT TRAINING: CPT

## 2019-01-04 PROCEDURE — 74176 CT ABD & PELVIS W/O CONTRAST: CPT

## 2019-01-04 PROCEDURE — G8978 MOBILITY CURRENT STATUS: HCPCS

## 2019-01-04 PROCEDURE — 6360000002 HC RX W HCPCS: Performed by: INTERNAL MEDICINE

## 2019-01-04 PROCEDURE — G8988 SELF CARE GOAL STATUS: HCPCS

## 2019-01-04 PROCEDURE — 6370000000 HC RX 637 (ALT 250 FOR IP): Performed by: STUDENT IN AN ORGANIZED HEALTH CARE EDUCATION/TRAINING PROGRAM

## 2019-01-04 PROCEDURE — 97530 THERAPEUTIC ACTIVITIES: CPT

## 2019-01-04 PROCEDURE — 97161 PT EVAL LOW COMPLEX 20 MIN: CPT

## 2019-01-04 PROCEDURE — 2580000003 HC RX 258: Performed by: INTERNAL MEDICINE

## 2019-01-04 PROCEDURE — 97167 OT EVAL HIGH COMPLEX 60 MIN: CPT

## 2019-01-04 PROCEDURE — G8987 SELF CARE CURRENT STATUS: HCPCS

## 2019-01-04 PROCEDURE — 2580000003 HC RX 258: Performed by: STUDENT IN AN ORGANIZED HEALTH CARE EDUCATION/TRAINING PROGRAM

## 2019-01-04 PROCEDURE — 80048 BASIC METABOLIC PNL TOTAL CA: CPT

## 2019-01-04 PROCEDURE — 36415 COLL VENOUS BLD VENIPUNCTURE: CPT

## 2019-01-04 RX ORDER — ZOLPIDEM TARTRATE 5 MG/1
5 TABLET ORAL NIGHTLY PRN
Status: DISCONTINUED | OUTPATIENT
Start: 2019-01-04 | End: 2019-01-11 | Stop reason: HOSPADM

## 2019-01-04 RX ORDER — POLYETHYLENE GLYCOL 3350 17 G/17G
17 POWDER, FOR SOLUTION ORAL DAILY
Status: DISCONTINUED | OUTPATIENT
Start: 2019-01-04 | End: 2019-01-11 | Stop reason: HOSPADM

## 2019-01-04 RX ORDER — SODIUM CHLORIDE 9 MG/ML
INJECTION, SOLUTION INTRAVENOUS CONTINUOUS
Status: ACTIVE | OUTPATIENT
Start: 2019-01-04 | End: 2019-01-05

## 2019-01-04 RX ORDER — 0.9 % SODIUM CHLORIDE 0.9 %
500 INTRAVENOUS SOLUTION INTRAVENOUS ONCE
Status: COMPLETED | OUTPATIENT
Start: 2019-01-04 | End: 2019-01-04

## 2019-01-04 RX ADMIN — METOPROLOL SUCCINATE 12.5 MG: 25 TABLET, EXTENDED RELEASE ORAL at 07:51

## 2019-01-04 RX ADMIN — GABAPENTIN 600 MG: 600 TABLET, FILM COATED ORAL at 23:19

## 2019-01-04 RX ADMIN — ZOLPIDEM TARTRATE 5 MG: 5 TABLET ORAL at 23:19

## 2019-01-04 RX ADMIN — GABAPENTIN 300 MG: 300 CAPSULE ORAL at 07:51

## 2019-01-04 RX ADMIN — SODIUM CHLORIDE: 900 INJECTION, SOLUTION INTRAVENOUS at 16:45

## 2019-01-04 RX ADMIN — PANTOPRAZOLE SODIUM 40 MG: 40 GRANULE, DELAYED RELEASE ORAL at 07:51

## 2019-01-04 RX ADMIN — MULTIPLE VITAMINS W/ MINERALS TAB 1 TABLET: TAB at 07:51

## 2019-01-04 RX ADMIN — ISOSORBIDE MONONITRATE 30 MG: 30 TABLET, EXTENDED RELEASE ORAL at 07:51

## 2019-01-04 RX ADMIN — SODIUM CHLORIDE 500 ML: 9 INJECTION, SOLUTION INTRAVENOUS at 16:44

## 2019-01-04 RX ADMIN — FERROUS SULFATE TAB 325 MG (65 MG ELEMENTAL FE) 325 MG: 325 (65 FE) TAB at 07:52

## 2019-01-04 RX ADMIN — Medication 10 ML: at 23:20

## 2019-01-04 RX ADMIN — SODIUM CHLORIDE: 9 INJECTION, SOLUTION INTRAVENOUS at 01:07

## 2019-01-04 RX ADMIN — ENOXAPARIN SODIUM 40 MG: 40 INJECTION SUBCUTANEOUS at 07:51

## 2019-01-04 RX ADMIN — POLYETHYLENE GLYCOL (3350) 17 G: 17 POWDER, FOR SOLUTION ORAL at 08:01

## 2019-01-04 RX ADMIN — ATORVASTATIN CALCIUM 80 MG: 80 TABLET, FILM COATED ORAL at 23:19

## 2019-01-04 RX ADMIN — GABAPENTIN 400 MG: 400 CAPSULE ORAL at 11:23

## 2019-01-04 RX ADMIN — Medication 10 MG: at 23:20

## 2019-01-04 RX ADMIN — SODIUM CHLORIDE: 9 INJECTION, SOLUTION INTRAVENOUS at 07:52

## 2019-01-04 RX ADMIN — CEFTRIAXONE SODIUM 2 G: 2 INJECTION, POWDER, FOR SOLUTION INTRAMUSCULAR; INTRAVENOUS at 23:19

## 2019-01-04 ASSESSMENT — PAIN DESCRIPTION - PROGRESSION
CLINICAL_PROGRESSION: NOT CHANGED

## 2019-01-04 ASSESSMENT — PAIN SCALES - GENERAL
PAINLEVEL_OUTOF10: 0

## 2019-01-05 LAB
ANION GAP SERPL CALCULATED.3IONS-SCNC: 13 MMOL/L (ref 3–16)
BASOPHILS ABSOLUTE: 0.1 K/UL (ref 0–0.2)
BASOPHILS RELATIVE PERCENT: 0.6 %
BUN BLDV-MCNC: 45 MG/DL (ref 7–20)
CALCIUM SERPL-MCNC: 8.3 MG/DL (ref 8.3–10.6)
CHLORIDE BLD-SCNC: 106 MMOL/L (ref 99–110)
CO2: 20 MMOL/L (ref 21–32)
CREAT SERPL-MCNC: 1.2 MG/DL (ref 0.8–1.3)
EOSINOPHILS ABSOLUTE: 0.2 K/UL (ref 0–0.6)
EOSINOPHILS RELATIVE PERCENT: 2.1 %
GFR AFRICAN AMERICAN: >60
GFR NON-AFRICAN AMERICAN: 59
GLUCOSE BLD-MCNC: 118 MG/DL (ref 70–99)
HCT VFR BLD CALC: 33 % (ref 40.5–52.5)
HEMOGLOBIN: 11.1 G/DL (ref 13.5–17.5)
LYMPHOCYTES ABSOLUTE: 0.9 K/UL (ref 1–5.1)
LYMPHOCYTES RELATIVE PERCENT: 9.3 %
MCH RBC QN AUTO: 33.3 PG (ref 26–34)
MCHC RBC AUTO-ENTMCNC: 33.7 G/DL (ref 31–36)
MCV RBC AUTO: 98.6 FL (ref 80–100)
MONOCYTES ABSOLUTE: 1 K/UL (ref 0–1.3)
MONOCYTES RELATIVE PERCENT: 10.1 %
NEUTROPHILS ABSOLUTE: 7.6 K/UL (ref 1.7–7.7)
NEUTROPHILS RELATIVE PERCENT: 77.9 %
PDW BLD-RTO: 14.3 % (ref 12.4–15.4)
PLATELET # BLD: 139 K/UL (ref 135–450)
PMV BLD AUTO: 9.4 FL (ref 5–10.5)
POTASSIUM REFLEX MAGNESIUM: 4.4 MMOL/L (ref 3.5–5.1)
RBC # BLD: 3.35 M/UL (ref 4.2–5.9)
SODIUM BLD-SCNC: 139 MMOL/L (ref 136–145)
WBC # BLD: 9.8 K/UL (ref 4–11)

## 2019-01-05 PROCEDURE — 6370000000 HC RX 637 (ALT 250 FOR IP): Performed by: INTERNAL MEDICINE

## 2019-01-05 PROCEDURE — 2580000003 HC RX 258: Performed by: STUDENT IN AN ORGANIZED HEALTH CARE EDUCATION/TRAINING PROGRAM

## 2019-01-05 PROCEDURE — 2580000003 HC RX 258: Performed by: INTERNAL MEDICINE

## 2019-01-05 PROCEDURE — 85025 COMPLETE CBC W/AUTO DIFF WBC: CPT

## 2019-01-05 PROCEDURE — 80048 BASIC METABOLIC PNL TOTAL CA: CPT

## 2019-01-05 PROCEDURE — 51798 US URINE CAPACITY MEASURE: CPT

## 2019-01-05 PROCEDURE — 1200000000 HC SEMI PRIVATE

## 2019-01-05 PROCEDURE — 6360000002 HC RX W HCPCS: Performed by: STUDENT IN AN ORGANIZED HEALTH CARE EDUCATION/TRAINING PROGRAM

## 2019-01-05 PROCEDURE — 36415 COLL VENOUS BLD VENIPUNCTURE: CPT

## 2019-01-05 PROCEDURE — 6370000000 HC RX 637 (ALT 250 FOR IP): Performed by: STUDENT IN AN ORGANIZED HEALTH CARE EDUCATION/TRAINING PROGRAM

## 2019-01-05 PROCEDURE — 6360000002 HC RX W HCPCS: Performed by: INTERNAL MEDICINE

## 2019-01-05 RX ORDER — CIPROFLOXACIN 2 MG/ML
400 INJECTION, SOLUTION INTRAVENOUS EVERY 12 HOURS
Status: DISCONTINUED | OUTPATIENT
Start: 2019-01-05 | End: 2019-01-05

## 2019-01-05 RX ORDER — SENNA PLUS 8.6 MG/1
2 TABLET ORAL NIGHTLY
Status: DISCONTINUED | OUTPATIENT
Start: 2019-01-05 | End: 2019-01-11 | Stop reason: HOSPADM

## 2019-01-05 RX ORDER — SODIUM CHLORIDE 9 MG/ML
INJECTION, SOLUTION INTRAVENOUS CONTINUOUS
Status: DISCONTINUED | OUTPATIENT
Start: 2019-01-05 | End: 2019-01-08

## 2019-01-05 RX ADMIN — Medication 10 ML: at 21:46

## 2019-01-05 RX ADMIN — PIPERACILLIN SODIUM,TAZOBACTAM SODIUM 3.38 G: 3; .375 INJECTION, POWDER, FOR SOLUTION INTRAVENOUS at 16:11

## 2019-01-05 RX ADMIN — FERROUS SULFATE TAB 325 MG (65 MG ELEMENTAL FE) 325 MG: 325 (65 FE) TAB at 08:58

## 2019-01-05 RX ADMIN — POLYETHYLENE GLYCOL (3350) 17 G: 17 POWDER, FOR SOLUTION ORAL at 08:57

## 2019-01-05 RX ADMIN — SODIUM CHLORIDE: 900 INJECTION, SOLUTION INTRAVENOUS at 03:36

## 2019-01-05 RX ADMIN — GABAPENTIN 400 MG: 400 CAPSULE ORAL at 12:30

## 2019-01-05 RX ADMIN — SODIUM CHLORIDE: 9 INJECTION, SOLUTION INTRAVENOUS at 12:30

## 2019-01-05 RX ADMIN — ATORVASTATIN CALCIUM 80 MG: 80 TABLET, FILM COATED ORAL at 21:45

## 2019-01-05 RX ADMIN — METOPROLOL SUCCINATE 12.5 MG: 25 TABLET, EXTENDED RELEASE ORAL at 08:58

## 2019-01-05 RX ADMIN — PIPERACILLIN SODIUM,TAZOBACTAM SODIUM 3.38 G: 3; .375 INJECTION, POWDER, FOR SOLUTION INTRAVENOUS at 23:51

## 2019-01-05 RX ADMIN — CIPROFLOXACIN 400 MG: 2 INJECTION, SOLUTION INTRAVENOUS at 12:30

## 2019-01-05 RX ADMIN — STANDARDIZED SENNA CONCENTRATE 17.2 MG: 8.6 TABLET ORAL at 21:45

## 2019-01-05 RX ADMIN — GABAPENTIN 600 MG: 600 TABLET, FILM COATED ORAL at 21:48

## 2019-01-05 RX ADMIN — PANTOPRAZOLE SODIUM 40 MG: 40 GRANULE, DELAYED RELEASE ORAL at 06:14

## 2019-01-05 RX ADMIN — Medication 10 MG: at 21:45

## 2019-01-05 RX ADMIN — MULTIPLE VITAMINS W/ MINERALS TAB 1 TABLET: TAB at 08:58

## 2019-01-05 RX ADMIN — ZOLPIDEM TARTRATE 5 MG: 5 TABLET ORAL at 23:47

## 2019-01-05 RX ADMIN — GABAPENTIN 300 MG: 300 CAPSULE ORAL at 08:58

## 2019-01-05 RX ADMIN — ENOXAPARIN SODIUM 40 MG: 40 INJECTION SUBCUTANEOUS at 08:57

## 2019-01-05 ASSESSMENT — PAIN SCALES - GENERAL
PAINLEVEL_OUTOF10: 0

## 2019-01-06 LAB
ANION GAP SERPL CALCULATED.3IONS-SCNC: 12 MMOL/L (ref 3–16)
BASOPHILS ABSOLUTE: 0 K/UL (ref 0–0.2)
BASOPHILS RELATIVE PERCENT: 0.5 %
BUN BLDV-MCNC: 46 MG/DL (ref 7–20)
CALCIUM SERPL-MCNC: 8.2 MG/DL (ref 8.3–10.6)
CHLORIDE BLD-SCNC: 109 MMOL/L (ref 99–110)
CO2: 19 MMOL/L (ref 21–32)
CREAT SERPL-MCNC: 2.1 MG/DL (ref 0.8–1.3)
EOSINOPHILS ABSOLUTE: 0.3 K/UL (ref 0–0.6)
EOSINOPHILS RELATIVE PERCENT: 3.4 %
GFR AFRICAN AMERICAN: 37
GFR NON-AFRICAN AMERICAN: 31
GLUCOSE BLD-MCNC: 116 MG/DL (ref 70–99)
HCT VFR BLD CALC: 31.5 % (ref 40.5–52.5)
HEMOGLOBIN: 10.3 G/DL (ref 13.5–17.5)
LYMPHOCYTES ABSOLUTE: 0.9 K/UL (ref 1–5.1)
LYMPHOCYTES RELATIVE PERCENT: 10.6 %
MCH RBC QN AUTO: 32.8 PG (ref 26–34)
MCHC RBC AUTO-ENTMCNC: 32.7 G/DL (ref 31–36)
MCV RBC AUTO: 100.5 FL (ref 80–100)
MONOCYTES ABSOLUTE: 1 K/UL (ref 0–1.3)
MONOCYTES RELATIVE PERCENT: 12.9 %
NEUTROPHILS ABSOLUTE: 5.8 K/UL (ref 1.7–7.7)
NEUTROPHILS RELATIVE PERCENT: 72.6 %
ORGANISM: ABNORMAL
PDW BLD-RTO: 14.4 % (ref 12.4–15.4)
PLATELET # BLD: 127 K/UL (ref 135–450)
PMV BLD AUTO: 9.4 FL (ref 5–10.5)
POTASSIUM REFLEX MAGNESIUM: 4.6 MMOL/L (ref 3.5–5.1)
RBC # BLD: 3.14 M/UL (ref 4.2–5.9)
SODIUM BLD-SCNC: 140 MMOL/L (ref 136–145)
URINE CULTURE, ROUTINE: ABNORMAL
WBC # BLD: 8 K/UL (ref 4–11)

## 2019-01-06 PROCEDURE — 6370000000 HC RX 637 (ALT 250 FOR IP): Performed by: UROLOGY

## 2019-01-06 PROCEDURE — 80048 BASIC METABOLIC PNL TOTAL CA: CPT

## 2019-01-06 PROCEDURE — 36415 COLL VENOUS BLD VENIPUNCTURE: CPT

## 2019-01-06 PROCEDURE — 2580000003 HC RX 258: Performed by: INTERNAL MEDICINE

## 2019-01-06 PROCEDURE — 99221 1ST HOSP IP/OBS SF/LOW 40: CPT | Performed by: INTERNAL MEDICINE

## 2019-01-06 PROCEDURE — 85025 COMPLETE CBC W/AUTO DIFF WBC: CPT

## 2019-01-06 PROCEDURE — 1200000000 HC SEMI PRIVATE

## 2019-01-06 PROCEDURE — 6370000000 HC RX 637 (ALT 250 FOR IP): Performed by: STUDENT IN AN ORGANIZED HEALTH CARE EDUCATION/TRAINING PROGRAM

## 2019-01-06 PROCEDURE — 6360000002 HC RX W HCPCS: Performed by: STUDENT IN AN ORGANIZED HEALTH CARE EDUCATION/TRAINING PROGRAM

## 2019-01-06 PROCEDURE — 51798 US URINE CAPACITY MEASURE: CPT

## 2019-01-06 PROCEDURE — 6360000002 HC RX W HCPCS: Performed by: INTERNAL MEDICINE

## 2019-01-06 PROCEDURE — 2580000003 HC RX 258: Performed by: STUDENT IN AN ORGANIZED HEALTH CARE EDUCATION/TRAINING PROGRAM

## 2019-01-06 PROCEDURE — 6370000000 HC RX 637 (ALT 250 FOR IP): Performed by: INTERNAL MEDICINE

## 2019-01-06 RX ORDER — TAMSULOSIN HYDROCHLORIDE 0.4 MG/1
0.4 CAPSULE ORAL DAILY
Status: DISCONTINUED | OUTPATIENT
Start: 2019-01-06 | End: 2019-01-09

## 2019-01-06 RX ADMIN — STANDARDIZED SENNA CONCENTRATE 17.2 MG: 8.6 TABLET ORAL at 20:22

## 2019-01-06 RX ADMIN — ATORVASTATIN CALCIUM 80 MG: 80 TABLET, FILM COATED ORAL at 20:27

## 2019-01-06 RX ADMIN — Medication 10 ML: at 08:25

## 2019-01-06 RX ADMIN — MULTIPLE VITAMINS W/ MINERALS TAB 1 TABLET: TAB at 08:24

## 2019-01-06 RX ADMIN — POLYETHYLENE GLYCOL (3350) 17 G: 17 POWDER, FOR SOLUTION ORAL at 08:25

## 2019-01-06 RX ADMIN — ZOLPIDEM TARTRATE 5 MG: 5 TABLET ORAL at 22:45

## 2019-01-06 RX ADMIN — ENOXAPARIN SODIUM 40 MG: 40 INJECTION SUBCUTANEOUS at 08:25

## 2019-01-06 RX ADMIN — GABAPENTIN 300 MG: 300 CAPSULE ORAL at 08:24

## 2019-01-06 RX ADMIN — PIPERACILLIN SODIUM,TAZOBACTAM SODIUM 3.38 G: 3; .375 INJECTION, POWDER, FOR SOLUTION INTRAVENOUS at 16:11

## 2019-01-06 RX ADMIN — SODIUM CHLORIDE: 9 INJECTION, SOLUTION INTRAVENOUS at 12:35

## 2019-01-06 RX ADMIN — PANTOPRAZOLE SODIUM 40 MG: 40 GRANULE, DELAYED RELEASE ORAL at 06:41

## 2019-01-06 RX ADMIN — PIPERACILLIN SODIUM,TAZOBACTAM SODIUM 3.38 G: 3; .375 INJECTION, POWDER, FOR SOLUTION INTRAVENOUS at 06:41

## 2019-01-06 RX ADMIN — SODIUM CHLORIDE: 9 INJECTION, SOLUTION INTRAVENOUS at 22:45

## 2019-01-06 RX ADMIN — GABAPENTIN 400 MG: 400 CAPSULE ORAL at 12:35

## 2019-01-06 RX ADMIN — Medication 10 MG: at 20:23

## 2019-01-06 RX ADMIN — GABAPENTIN 600 MG: 600 TABLET, FILM COATED ORAL at 20:22

## 2019-01-06 RX ADMIN — TAMSULOSIN HYDROCHLORIDE 0.4 MG: 0.4 CAPSULE ORAL at 16:11

## 2019-01-06 RX ADMIN — FERROUS SULFATE TAB 325 MG (65 MG ELEMENTAL FE) 325 MG: 325 (65 FE) TAB at 08:24

## 2019-01-06 RX ADMIN — SODIUM CHLORIDE: 9 INJECTION, SOLUTION INTRAVENOUS at 02:10

## 2019-01-06 RX ADMIN — PIPERACILLIN SODIUM,TAZOBACTAM SODIUM 3.38 G: 3; .375 INJECTION, POWDER, FOR SOLUTION INTRAVENOUS at 22:43

## 2019-01-06 RX ADMIN — METOPROLOL SUCCINATE 12.5 MG: 25 TABLET, EXTENDED RELEASE ORAL at 08:24

## 2019-01-06 ASSESSMENT — PAIN SCALES - GENERAL
PAINLEVEL_OUTOF10: 0

## 2019-01-07 PROBLEM — N17.9 AKI (ACUTE KIDNEY INJURY) (HCC): Status: ACTIVE | Noted: 2019-01-07

## 2019-01-07 PROBLEM — N39.0 COMPLICATED UTI (URINARY TRACT INFECTION): Status: ACTIVE | Noted: 2019-01-07

## 2019-01-07 LAB
ANION GAP SERPL CALCULATED.3IONS-SCNC: 10 MMOL/L (ref 3–16)
BASOPHILS ABSOLUTE: 0 K/UL (ref 0–0.2)
BASOPHILS RELATIVE PERCENT: 0.3 %
BUN BLDV-MCNC: 39 MG/DL (ref 7–20)
CALCIUM SERPL-MCNC: 8.2 MG/DL (ref 8.3–10.6)
CHLORIDE BLD-SCNC: 113 MMOL/L (ref 99–110)
CO2: 20 MMOL/L (ref 21–32)
CREAT SERPL-MCNC: 1.6 MG/DL (ref 0.8–1.3)
EOSINOPHILS ABSOLUTE: 0.4 K/UL (ref 0–0.6)
EOSINOPHILS RELATIVE PERCENT: 5.5 %
GFR AFRICAN AMERICAN: 51
GFR NON-AFRICAN AMERICAN: 42
GLUCOSE BLD-MCNC: 97 MG/DL (ref 70–99)
HCT VFR BLD CALC: 31.3 % (ref 40.5–52.5)
HEMOGLOBIN: 10.2 G/DL (ref 13.5–17.5)
LYMPHOCYTES ABSOLUTE: 1 K/UL (ref 1–5.1)
LYMPHOCYTES RELATIVE PERCENT: 12.5 %
MCH RBC QN AUTO: 32.8 PG (ref 26–34)
MCHC RBC AUTO-ENTMCNC: 32.7 G/DL (ref 31–36)
MCV RBC AUTO: 100.2 FL (ref 80–100)
MONOCYTES ABSOLUTE: 1 K/UL (ref 0–1.3)
MONOCYTES RELATIVE PERCENT: 12.8 %
NEUTROPHILS ABSOLUTE: 5.6 K/UL (ref 1.7–7.7)
NEUTROPHILS RELATIVE PERCENT: 68.9 %
PDW BLD-RTO: 14.3 % (ref 12.4–15.4)
PLATELET # BLD: 133 K/UL (ref 135–450)
PMV BLD AUTO: 8.8 FL (ref 5–10.5)
POTASSIUM REFLEX MAGNESIUM: 4.7 MMOL/L (ref 3.5–5.1)
RBC # BLD: 3.12 M/UL (ref 4.2–5.9)
SODIUM BLD-SCNC: 143 MMOL/L (ref 136–145)
WBC # BLD: 8.1 K/UL (ref 4–11)

## 2019-01-07 PROCEDURE — 6360000002 HC RX W HCPCS: Performed by: STUDENT IN AN ORGANIZED HEALTH CARE EDUCATION/TRAINING PROGRAM

## 2019-01-07 PROCEDURE — 2580000003 HC RX 258: Performed by: INTERNAL MEDICINE

## 2019-01-07 PROCEDURE — 6370000000 HC RX 637 (ALT 250 FOR IP): Performed by: STUDENT IN AN ORGANIZED HEALTH CARE EDUCATION/TRAINING PROGRAM

## 2019-01-07 PROCEDURE — 6370000000 HC RX 637 (ALT 250 FOR IP): Performed by: UROLOGY

## 2019-01-07 PROCEDURE — 80048 BASIC METABOLIC PNL TOTAL CA: CPT

## 2019-01-07 PROCEDURE — 6360000002 HC RX W HCPCS: Performed by: INTERNAL MEDICINE

## 2019-01-07 PROCEDURE — 85025 COMPLETE CBC W/AUTO DIFF WBC: CPT

## 2019-01-07 PROCEDURE — 1200000000 HC SEMI PRIVATE

## 2019-01-07 PROCEDURE — 6370000000 HC RX 637 (ALT 250 FOR IP): Performed by: INTERNAL MEDICINE

## 2019-01-07 PROCEDURE — 99232 SBSQ HOSP IP/OBS MODERATE 35: CPT | Performed by: INTERNAL MEDICINE

## 2019-01-07 PROCEDURE — 36415 COLL VENOUS BLD VENIPUNCTURE: CPT

## 2019-01-07 RX ADMIN — GABAPENTIN 600 MG: 600 TABLET, FILM COATED ORAL at 21:09

## 2019-01-07 RX ADMIN — POLYETHYLENE GLYCOL (3350) 17 G: 17 POWDER, FOR SOLUTION ORAL at 10:13

## 2019-01-07 RX ADMIN — ATORVASTATIN CALCIUM 80 MG: 80 TABLET, FILM COATED ORAL at 21:13

## 2019-01-07 RX ADMIN — ENOXAPARIN SODIUM 40 MG: 40 INJECTION SUBCUTANEOUS at 10:13

## 2019-01-07 RX ADMIN — PIPERACILLIN SODIUM,TAZOBACTAM SODIUM 3.38 G: 3; .375 INJECTION, POWDER, FOR SOLUTION INTRAVENOUS at 23:41

## 2019-01-07 RX ADMIN — MULTIPLE VITAMINS W/ MINERALS TAB 1 TABLET: TAB at 10:12

## 2019-01-07 RX ADMIN — FERROUS SULFATE TAB 325 MG (65 MG ELEMENTAL FE) 325 MG: 325 (65 FE) TAB at 10:13

## 2019-01-07 RX ADMIN — GABAPENTIN 400 MG: 400 CAPSULE ORAL at 13:04

## 2019-01-07 RX ADMIN — STANDARDIZED SENNA CONCENTRATE 17.2 MG: 8.6 TABLET ORAL at 21:09

## 2019-01-07 RX ADMIN — TAMSULOSIN HYDROCHLORIDE 0.4 MG: 0.4 CAPSULE ORAL at 10:13

## 2019-01-07 RX ADMIN — PIPERACILLIN SODIUM,TAZOBACTAM SODIUM 3.38 G: 3; .375 INJECTION, POWDER, FOR SOLUTION INTRAVENOUS at 06:30

## 2019-01-07 RX ADMIN — ZOLPIDEM TARTRATE 5 MG: 5 TABLET ORAL at 23:41

## 2019-01-07 RX ADMIN — PIPERACILLIN SODIUM,TAZOBACTAM SODIUM 3.38 G: 3; .375 INJECTION, POWDER, FOR SOLUTION INTRAVENOUS at 14:58

## 2019-01-07 RX ADMIN — PANTOPRAZOLE SODIUM 40 MG: 40 GRANULE, DELAYED RELEASE ORAL at 06:06

## 2019-01-07 RX ADMIN — Medication 10 MG: at 21:09

## 2019-01-07 RX ADMIN — METOPROLOL SUCCINATE 12.5 MG: 25 TABLET, EXTENDED RELEASE ORAL at 10:13

## 2019-01-07 RX ADMIN — SODIUM CHLORIDE: 9 INJECTION, SOLUTION INTRAVENOUS at 21:06

## 2019-01-07 RX ADMIN — GABAPENTIN 300 MG: 300 CAPSULE ORAL at 10:12

## 2019-01-07 ASSESSMENT — PAIN SCALES - GENERAL
PAINLEVEL_OUTOF10: 0

## 2019-01-08 LAB
ANION GAP SERPL CALCULATED.3IONS-SCNC: 10 MMOL/L (ref 3–16)
BASOPHILS ABSOLUTE: 0 K/UL (ref 0–0.2)
BASOPHILS RELATIVE PERCENT: 0.6 %
BLOOD CULTURE, ROUTINE: NORMAL
BUN BLDV-MCNC: 33 MG/DL (ref 7–20)
CALCIUM SERPL-MCNC: 8.2 MG/DL (ref 8.3–10.6)
CHLORIDE BLD-SCNC: 112 MMOL/L (ref 99–110)
CO2: 18 MMOL/L (ref 21–32)
CREAT SERPL-MCNC: 1.2 MG/DL (ref 0.8–1.3)
CULTURE, BLOOD 2: NORMAL
EOSINOPHILS ABSOLUTE: 0.5 K/UL (ref 0–0.6)
EOSINOPHILS RELATIVE PERCENT: 6.1 %
GFR AFRICAN AMERICAN: >60
GFR NON-AFRICAN AMERICAN: 59
GLUCOSE BLD-MCNC: 98 MG/DL (ref 70–99)
HCT VFR BLD CALC: 31.1 % (ref 40.5–52.5)
HEMOGLOBIN: 10.2 G/DL (ref 13.5–17.5)
LYMPHOCYTES ABSOLUTE: 1.1 K/UL (ref 1–5.1)
LYMPHOCYTES RELATIVE PERCENT: 14.1 %
MCH RBC QN AUTO: 32.8 PG (ref 26–34)
MCHC RBC AUTO-ENTMCNC: 32.6 G/DL (ref 31–36)
MCV RBC AUTO: 100.3 FL (ref 80–100)
MONOCYTES ABSOLUTE: 0.8 K/UL (ref 0–1.3)
MONOCYTES RELATIVE PERCENT: 10.8 %
NEUTROPHILS ABSOLUTE: 5.3 K/UL (ref 1.7–7.7)
NEUTROPHILS RELATIVE PERCENT: 68.4 %
PDW BLD-RTO: 14.2 % (ref 12.4–15.4)
PLATELET # BLD: 149 K/UL (ref 135–450)
PMV BLD AUTO: 8.4 FL (ref 5–10.5)
POTASSIUM REFLEX MAGNESIUM: 4.9 MMOL/L (ref 3.5–5.1)
RBC # BLD: 3.1 M/UL (ref 4.2–5.9)
SODIUM BLD-SCNC: 140 MMOL/L (ref 136–145)
WBC # BLD: 7.7 K/UL (ref 4–11)

## 2019-01-08 PROCEDURE — 6370000000 HC RX 637 (ALT 250 FOR IP): Performed by: INTERNAL MEDICINE

## 2019-01-08 PROCEDURE — 36415 COLL VENOUS BLD VENIPUNCTURE: CPT

## 2019-01-08 PROCEDURE — 97535 SELF CARE MNGMENT TRAINING: CPT

## 2019-01-08 PROCEDURE — 6370000000 HC RX 637 (ALT 250 FOR IP): Performed by: STUDENT IN AN ORGANIZED HEALTH CARE EDUCATION/TRAINING PROGRAM

## 2019-01-08 PROCEDURE — 6360000002 HC RX W HCPCS: Performed by: INTERNAL MEDICINE

## 2019-01-08 PROCEDURE — 2580000003 HC RX 258: Performed by: INTERNAL MEDICINE

## 2019-01-08 PROCEDURE — 6360000002 HC RX W HCPCS: Performed by: STUDENT IN AN ORGANIZED HEALTH CARE EDUCATION/TRAINING PROGRAM

## 2019-01-08 PROCEDURE — 2580000003 HC RX 258: Performed by: STUDENT IN AN ORGANIZED HEALTH CARE EDUCATION/TRAINING PROGRAM

## 2019-01-08 PROCEDURE — 97530 THERAPEUTIC ACTIVITIES: CPT

## 2019-01-08 PROCEDURE — 1200000000 HC SEMI PRIVATE

## 2019-01-08 PROCEDURE — 85025 COMPLETE CBC W/AUTO DIFF WBC: CPT

## 2019-01-08 PROCEDURE — 51798 US URINE CAPACITY MEASURE: CPT

## 2019-01-08 PROCEDURE — 6370000000 HC RX 637 (ALT 250 FOR IP): Performed by: UROLOGY

## 2019-01-08 PROCEDURE — 80048 BASIC METABOLIC PNL TOTAL CA: CPT

## 2019-01-08 RX ADMIN — GABAPENTIN 300 MG: 300 CAPSULE ORAL at 09:46

## 2019-01-08 RX ADMIN — METOPROLOL SUCCINATE 12.5 MG: 25 TABLET, EXTENDED RELEASE ORAL at 09:45

## 2019-01-08 RX ADMIN — SODIUM CHLORIDE: 9 INJECTION, SOLUTION INTRAVENOUS at 06:22

## 2019-01-08 RX ADMIN — FERROUS SULFATE TAB 325 MG (65 MG ELEMENTAL FE) 325 MG: 325 (65 FE) TAB at 09:45

## 2019-01-08 RX ADMIN — STANDARDIZED SENNA CONCENTRATE 17.2 MG: 8.6 TABLET ORAL at 20:04

## 2019-01-08 RX ADMIN — ATORVASTATIN CALCIUM 80 MG: 80 TABLET, FILM COATED ORAL at 20:04

## 2019-01-08 RX ADMIN — TAMSULOSIN HYDROCHLORIDE 0.4 MG: 0.4 CAPSULE ORAL at 09:46

## 2019-01-08 RX ADMIN — ZOLPIDEM TARTRATE 5 MG: 5 TABLET ORAL at 22:32

## 2019-01-08 RX ADMIN — ENOXAPARIN SODIUM 40 MG: 40 INJECTION SUBCUTANEOUS at 09:45

## 2019-01-08 RX ADMIN — MULTIPLE VITAMINS W/ MINERALS TAB 1 TABLET: TAB at 09:45

## 2019-01-08 RX ADMIN — GABAPENTIN 400 MG: 400 CAPSULE ORAL at 13:45

## 2019-01-08 RX ADMIN — PIPERACILLIN SODIUM,TAZOBACTAM SODIUM 3.38 G: 3; .375 INJECTION, POWDER, FOR SOLUTION INTRAVENOUS at 06:24

## 2019-01-08 RX ADMIN — PIPERACILLIN SODIUM,TAZOBACTAM SODIUM 3.38 G: 3; .375 INJECTION, POWDER, FOR SOLUTION INTRAVENOUS at 22:32

## 2019-01-08 RX ADMIN — POLYETHYLENE GLYCOL (3350) 17 G: 17 POWDER, FOR SOLUTION ORAL at 09:45

## 2019-01-08 RX ADMIN — GABAPENTIN 600 MG: 600 TABLET, FILM COATED ORAL at 20:04

## 2019-01-08 RX ADMIN — Medication 10 ML: at 20:04

## 2019-01-08 RX ADMIN — PANTOPRAZOLE SODIUM 40 MG: 40 GRANULE, DELAYED RELEASE ORAL at 06:24

## 2019-01-08 RX ADMIN — Medication 10 MG: at 20:04

## 2019-01-08 RX ADMIN — PIPERACILLIN SODIUM,TAZOBACTAM SODIUM 3.38 G: 3; .375 INJECTION, POWDER, FOR SOLUTION INTRAVENOUS at 16:34

## 2019-01-08 ASSESSMENT — PAIN SCALES - GENERAL
PAINLEVEL_OUTOF10: 0
PAINLEVEL_OUTOF10: 0

## 2019-01-09 ENCOUNTER — ANESTHESIA EVENT (OUTPATIENT)
Dept: OPERATING ROOM | Age: 79
DRG: 690 | End: 2019-01-09
Payer: MEDICARE

## 2019-01-09 LAB
ANION GAP SERPL CALCULATED.3IONS-SCNC: 11 MMOL/L (ref 3–16)
BASOPHILS ABSOLUTE: 0 K/UL (ref 0–0.2)
BASOPHILS RELATIVE PERCENT: 0.4 %
BUN BLDV-MCNC: 38 MG/DL (ref 7–20)
CALCIUM SERPL-MCNC: 8.5 MG/DL (ref 8.3–10.6)
CHLORIDE BLD-SCNC: 108 MMOL/L (ref 99–110)
CO2: 19 MMOL/L (ref 21–32)
CREAT SERPL-MCNC: 1.3 MG/DL (ref 0.8–1.3)
EOSINOPHILS ABSOLUTE: 0.6 K/UL (ref 0–0.6)
EOSINOPHILS RELATIVE PERCENT: 7.3 %
GFR AFRICAN AMERICAN: >60
GFR NON-AFRICAN AMERICAN: 53
GLUCOSE BLD-MCNC: 147 MG/DL (ref 70–99)
HCT VFR BLD CALC: 32.7 % (ref 40.5–52.5)
HEMOGLOBIN: 10.6 G/DL (ref 13.5–17.5)
LYMPHOCYTES ABSOLUTE: 1.1 K/UL (ref 1–5.1)
LYMPHOCYTES RELATIVE PERCENT: 13 %
MCH RBC QN AUTO: 32.9 PG (ref 26–34)
MCHC RBC AUTO-ENTMCNC: 32.6 G/DL (ref 31–36)
MCV RBC AUTO: 101.1 FL (ref 80–100)
MONOCYTES ABSOLUTE: 0.5 K/UL (ref 0–1.3)
MONOCYTES RELATIVE PERCENT: 6.3 %
NEUTROPHILS ABSOLUTE: 6.3 K/UL (ref 1.7–7.7)
NEUTROPHILS RELATIVE PERCENT: 73 %
PDW BLD-RTO: 14.4 % (ref 12.4–15.4)
PLATELET # BLD: 169 K/UL (ref 135–450)
PMV BLD AUTO: 8.4 FL (ref 5–10.5)
POTASSIUM REFLEX MAGNESIUM: 4.6 MMOL/L (ref 3.5–5.1)
RBC # BLD: 3.23 M/UL (ref 4.2–5.9)
SODIUM BLD-SCNC: 138 MMOL/L (ref 136–145)
WBC # BLD: 8.6 K/UL (ref 4–11)

## 2019-01-09 PROCEDURE — 2580000003 HC RX 258: Performed by: INTERNAL MEDICINE

## 2019-01-09 PROCEDURE — 51798 US URINE CAPACITY MEASURE: CPT

## 2019-01-09 PROCEDURE — 85025 COMPLETE CBC W/AUTO DIFF WBC: CPT

## 2019-01-09 PROCEDURE — 1200000000 HC SEMI PRIVATE

## 2019-01-09 PROCEDURE — 99232 SBSQ HOSP IP/OBS MODERATE 35: CPT | Performed by: INTERNAL MEDICINE

## 2019-01-09 PROCEDURE — 36415 COLL VENOUS BLD VENIPUNCTURE: CPT

## 2019-01-09 PROCEDURE — 6370000000 HC RX 637 (ALT 250 FOR IP): Performed by: INTERNAL MEDICINE

## 2019-01-09 PROCEDURE — 6360000002 HC RX W HCPCS: Performed by: INTERNAL MEDICINE

## 2019-01-09 PROCEDURE — 6370000000 HC RX 637 (ALT 250 FOR IP): Performed by: STUDENT IN AN ORGANIZED HEALTH CARE EDUCATION/TRAINING PROGRAM

## 2019-01-09 PROCEDURE — 2580000003 HC RX 258: Performed by: STUDENT IN AN ORGANIZED HEALTH CARE EDUCATION/TRAINING PROGRAM

## 2019-01-09 PROCEDURE — 80048 BASIC METABOLIC PNL TOTAL CA: CPT

## 2019-01-09 PROCEDURE — 51701 INSERT BLADDER CATHETER: CPT

## 2019-01-09 PROCEDURE — 6360000002 HC RX W HCPCS: Performed by: STUDENT IN AN ORGANIZED HEALTH CARE EDUCATION/TRAINING PROGRAM

## 2019-01-09 RX ORDER — TAMSULOSIN HYDROCHLORIDE 0.4 MG/1
0.8 CAPSULE ORAL DAILY
Qty: 60 CAPSULE | Refills: 0 | Status: SHIPPED | OUTPATIENT
Start: 2019-01-10 | End: 2019-12-17 | Stop reason: CLARIF

## 2019-01-09 RX ORDER — CIPROFLOXACIN 500 MG/1
500 TABLET, FILM COATED ORAL 2 TIMES DAILY
Qty: 14 TABLET | Refills: 0 | Status: SHIPPED | OUTPATIENT
Start: 2019-01-09 | End: 2019-01-11

## 2019-01-09 RX ORDER — TAMSULOSIN HYDROCHLORIDE 0.4 MG/1
0.8 CAPSULE ORAL DAILY
Status: DISCONTINUED | OUTPATIENT
Start: 2019-01-09 | End: 2019-01-11 | Stop reason: HOSPADM

## 2019-01-09 RX ORDER — ISOSORBIDE MONONITRATE 30 MG/1
30 TABLET, EXTENDED RELEASE ORAL NIGHTLY
Qty: 90 TABLET | Refills: 0 | Status: SHIPPED | OUTPATIENT
Start: 2019-01-09 | End: 2019-12-17 | Stop reason: CLARIF

## 2019-01-09 RX ADMIN — ATORVASTATIN CALCIUM 80 MG: 80 TABLET, FILM COATED ORAL at 21:05

## 2019-01-09 RX ADMIN — STANDARDIZED SENNA CONCENTRATE 17.2 MG: 8.6 TABLET ORAL at 21:05

## 2019-01-09 RX ADMIN — ENOXAPARIN SODIUM 40 MG: 40 INJECTION SUBCUTANEOUS at 11:04

## 2019-01-09 RX ADMIN — FERROUS SULFATE TAB 325 MG (65 MG ELEMENTAL FE) 325 MG: 325 (65 FE) TAB at 10:59

## 2019-01-09 RX ADMIN — TAMSULOSIN HYDROCHLORIDE 0.8 MG: 0.4 CAPSULE ORAL at 11:00

## 2019-01-09 RX ADMIN — POLYETHYLENE GLYCOL (3350) 17 G: 17 POWDER, FOR SOLUTION ORAL at 11:03

## 2019-01-09 RX ADMIN — PANTOPRAZOLE SODIUM 40 MG: 40 GRANULE, DELAYED RELEASE ORAL at 06:05

## 2019-01-09 RX ADMIN — GABAPENTIN 400 MG: 400 CAPSULE ORAL at 15:31

## 2019-01-09 RX ADMIN — GABAPENTIN 600 MG: 600 TABLET, FILM COATED ORAL at 21:05

## 2019-01-09 RX ADMIN — GABAPENTIN 300 MG: 300 CAPSULE ORAL at 11:03

## 2019-01-09 RX ADMIN — Medication 10 MG: at 21:05

## 2019-01-09 RX ADMIN — Medication 10 ML: at 21:06

## 2019-01-09 RX ADMIN — MULTIPLE VITAMINS W/ MINERALS TAB 1 TABLET: TAB at 11:02

## 2019-01-09 RX ADMIN — METOPROLOL SUCCINATE 12.5 MG: 25 TABLET, EXTENDED RELEASE ORAL at 11:02

## 2019-01-09 RX ADMIN — PIPERACILLIN SODIUM,TAZOBACTAM SODIUM 3.38 G: 3; .375 INJECTION, POWDER, FOR SOLUTION INTRAVENOUS at 15:31

## 2019-01-09 RX ADMIN — PIPERACILLIN SODIUM,TAZOBACTAM SODIUM 3.38 G: 3; .375 INJECTION, POWDER, FOR SOLUTION INTRAVENOUS at 06:45

## 2019-01-09 ASSESSMENT — PAIN SCALES - GENERAL
PAINLEVEL_OUTOF10: 0

## 2019-01-10 ENCOUNTER — APPOINTMENT (OUTPATIENT)
Dept: GENERAL RADIOLOGY | Age: 79
DRG: 690 | End: 2019-01-10
Payer: MEDICARE

## 2019-01-10 ENCOUNTER — ANESTHESIA (OUTPATIENT)
Dept: OPERATING ROOM | Age: 79
DRG: 690 | End: 2019-01-10
Payer: MEDICARE

## 2019-01-10 VITALS
DIASTOLIC BLOOD PRESSURE: 57 MMHG | OXYGEN SATURATION: 98 % | RESPIRATION RATE: 1 BRPM | SYSTOLIC BLOOD PRESSURE: 112 MMHG

## 2019-01-10 LAB
ANION GAP SERPL CALCULATED.3IONS-SCNC: 9 MMOL/L (ref 3–16)
BASOPHILS ABSOLUTE: 0 K/UL (ref 0–0.2)
BASOPHILS RELATIVE PERCENT: 0.4 %
BUN BLDV-MCNC: 35 MG/DL (ref 7–20)
CALCIUM SERPL-MCNC: 8.1 MG/DL (ref 8.3–10.6)
CHLORIDE BLD-SCNC: 111 MMOL/L (ref 99–110)
CO2: 20 MMOL/L (ref 21–32)
CREAT SERPL-MCNC: 1 MG/DL (ref 0.8–1.3)
EOSINOPHILS ABSOLUTE: 0.6 K/UL (ref 0–0.6)
EOSINOPHILS RELATIVE PERCENT: 7.2 %
GFR AFRICAN AMERICAN: >60
GFR NON-AFRICAN AMERICAN: >60
GLUCOSE BLD-MCNC: 94 MG/DL (ref 70–99)
HCT VFR BLD CALC: 31.3 % (ref 40.5–52.5)
HEMOGLOBIN: 10.5 G/DL (ref 13.5–17.5)
LYMPHOCYTES ABSOLUTE: 1 K/UL (ref 1–5.1)
LYMPHOCYTES RELATIVE PERCENT: 11.4 %
MCH RBC QN AUTO: 33.8 PG (ref 26–34)
MCHC RBC AUTO-ENTMCNC: 33.7 G/DL (ref 31–36)
MCV RBC AUTO: 100.2 FL (ref 80–100)
MONOCYTES ABSOLUTE: 0.6 K/UL (ref 0–1.3)
MONOCYTES RELATIVE PERCENT: 6.9 %
NEUTROPHILS ABSOLUTE: 6.3 K/UL (ref 1.7–7.7)
NEUTROPHILS RELATIVE PERCENT: 74.1 %
PDW BLD-RTO: 14.1 % (ref 12.4–15.4)
PLATELET # BLD: 168 K/UL (ref 135–450)
PMV BLD AUTO: 8.4 FL (ref 5–10.5)
POTASSIUM REFLEX MAGNESIUM: 4.7 MMOL/L (ref 3.5–5.1)
RBC # BLD: 3.12 M/UL (ref 4.2–5.9)
SODIUM BLD-SCNC: 140 MMOL/L (ref 136–145)
WBC # BLD: 8.5 K/UL (ref 4–11)

## 2019-01-10 PROCEDURE — 3700000001 HC ADD 15 MINUTES (ANESTHESIA): Performed by: UROLOGY

## 2019-01-10 PROCEDURE — 2580000003 HC RX 258: Performed by: STUDENT IN AN ORGANIZED HEALTH CARE EDUCATION/TRAINING PROGRAM

## 2019-01-10 PROCEDURE — 7100000001 HC PACU RECOVERY - ADDTL 15 MIN: Performed by: UROLOGY

## 2019-01-10 PROCEDURE — 3600000004 HC SURGERY LEVEL 4 BASE: Performed by: UROLOGY

## 2019-01-10 PROCEDURE — 51798 US URINE CAPACITY MEASURE: CPT

## 2019-01-10 PROCEDURE — 6360000002 HC RX W HCPCS: Performed by: INTERNAL MEDICINE

## 2019-01-10 PROCEDURE — 2580000003 HC RX 258: Performed by: NURSE ANESTHETIST, CERTIFIED REGISTERED

## 2019-01-10 PROCEDURE — 1200000000 HC SEMI PRIVATE

## 2019-01-10 PROCEDURE — 36415 COLL VENOUS BLD VENIPUNCTURE: CPT

## 2019-01-10 PROCEDURE — 3700000000 HC ANESTHESIA ATTENDED CARE: Performed by: UROLOGY

## 2019-01-10 PROCEDURE — 3600000014 HC SURGERY LEVEL 4 ADDTL 15MIN: Performed by: UROLOGY

## 2019-01-10 PROCEDURE — 85025 COMPLETE CBC W/AUTO DIFF WBC: CPT

## 2019-01-10 PROCEDURE — 7100000000 HC PACU RECOVERY - FIRST 15 MIN: Performed by: UROLOGY

## 2019-01-10 PROCEDURE — 0TJB8ZZ INSPECTION OF BLADDER, VIA NATURAL OR ARTIFICIAL OPENING ENDOSCOPIC: ICD-10-PCS | Performed by: UROLOGY

## 2019-01-10 PROCEDURE — 6370000000 HC RX 637 (ALT 250 FOR IP): Performed by: INTERNAL MEDICINE

## 2019-01-10 PROCEDURE — 51701 INSERT BLADDER CATHETER: CPT

## 2019-01-10 PROCEDURE — 2500000003 HC RX 250 WO HCPCS: Performed by: NURSE ANESTHETIST, CERTIFIED REGISTERED

## 2019-01-10 PROCEDURE — 6360000002 HC RX W HCPCS: Performed by: NURSE ANESTHETIST, CERTIFIED REGISTERED

## 2019-01-10 PROCEDURE — 6370000000 HC RX 637 (ALT 250 FOR IP): Performed by: STUDENT IN AN ORGANIZED HEALTH CARE EDUCATION/TRAINING PROGRAM

## 2019-01-10 PROCEDURE — 6360000002 HC RX W HCPCS

## 2019-01-10 PROCEDURE — 2580000003 HC RX 258: Performed by: INTERNAL MEDICINE

## 2019-01-10 PROCEDURE — 2580000003 HC RX 258: Performed by: UROLOGY

## 2019-01-10 PROCEDURE — 6370000000 HC RX 637 (ALT 250 FOR IP): Performed by: ANESTHESIOLOGY

## 2019-01-10 PROCEDURE — 80048 BASIC METABOLIC PNL TOTAL CA: CPT

## 2019-01-10 PROCEDURE — 2709999900 HC NON-CHARGEABLE SUPPLY: Performed by: UROLOGY

## 2019-01-10 PROCEDURE — 6360000002 HC RX W HCPCS: Performed by: ANESTHESIOLOGY

## 2019-01-10 RX ORDER — MEPERIDINE HYDROCHLORIDE 25 MG/ML
12.5 INJECTION INTRAMUSCULAR; INTRAVENOUS; SUBCUTANEOUS EVERY 5 MIN PRN
Status: DISCONTINUED | OUTPATIENT
Start: 2019-01-10 | End: 2019-01-11 | Stop reason: HOSPADM

## 2019-01-10 RX ORDER — ONDANSETRON 2 MG/ML
4 INJECTION INTRAMUSCULAR; INTRAVENOUS
Status: DISCONTINUED | OUTPATIENT
Start: 2019-01-10 | End: 2019-01-10 | Stop reason: HOSPADM

## 2019-01-10 RX ORDER — MAGNESIUM HYDROXIDE 1200 MG/15ML
LIQUID ORAL CONTINUOUS PRN
Status: COMPLETED | OUTPATIENT
Start: 2019-01-10 | End: 2019-01-10

## 2019-01-10 RX ORDER — SODIUM CHLORIDE 9 MG/ML
INJECTION, SOLUTION INTRAVENOUS CONTINUOUS PRN
Status: DISCONTINUED | OUTPATIENT
Start: 2019-01-10 | End: 2019-01-10 | Stop reason: SDUPTHER

## 2019-01-10 RX ORDER — DIPHENHYDRAMINE HYDROCHLORIDE 50 MG/ML
12.5 INJECTION INTRAMUSCULAR; INTRAVENOUS
Status: DISPENSED | OUTPATIENT
Start: 2019-01-10 | End: 2019-01-10

## 2019-01-10 RX ORDER — FENTANYL CITRATE 50 UG/ML
25 INJECTION, SOLUTION INTRAMUSCULAR; INTRAVENOUS EVERY 5 MIN PRN
Status: DISCONTINUED | OUTPATIENT
Start: 2019-01-10 | End: 2019-01-11 | Stop reason: HOSPADM

## 2019-01-10 RX ORDER — HYDRALAZINE HYDROCHLORIDE 20 MG/ML
5 INJECTION INTRAMUSCULAR; INTRAVENOUS EVERY 10 MIN PRN
Status: DISCONTINUED | OUTPATIENT
Start: 2019-01-10 | End: 2019-01-11 | Stop reason: HOSPADM

## 2019-01-10 RX ORDER — ONDANSETRON 2 MG/ML
4 INJECTION INTRAMUSCULAR; INTRAVENOUS
Status: ACTIVE | OUTPATIENT
Start: 2019-01-10 | End: 2019-01-10

## 2019-01-10 RX ORDER — HYDRALAZINE HYDROCHLORIDE 20 MG/ML
5 INJECTION INTRAMUSCULAR; INTRAVENOUS EVERY 10 MIN PRN
Status: DISCONTINUED | OUTPATIENT
Start: 2019-01-10 | End: 2019-01-10 | Stop reason: HOSPADM

## 2019-01-10 RX ORDER — LABETALOL HYDROCHLORIDE 5 MG/ML
5 INJECTION, SOLUTION INTRAVENOUS EVERY 10 MIN PRN
Status: DISCONTINUED | OUTPATIENT
Start: 2019-01-10 | End: 2019-01-11 | Stop reason: HOSPADM

## 2019-01-10 RX ORDER — LIDOCAINE HYDROCHLORIDE 20 MG/ML
INJECTION, SOLUTION EPIDURAL; INFILTRATION; INTRACAUDAL; PERINEURAL PRN
Status: DISCONTINUED | OUTPATIENT
Start: 2019-01-10 | End: 2019-01-10 | Stop reason: SDUPTHER

## 2019-01-10 RX ORDER — DIPHENHYDRAMINE HYDROCHLORIDE 50 MG/ML
12.5 INJECTION INTRAMUSCULAR; INTRAVENOUS
Status: COMPLETED | OUTPATIENT
Start: 2019-01-10 | End: 2019-01-10

## 2019-01-10 RX ORDER — EPHEDRINE SULFATE 50 MG/ML
INJECTION INTRAVENOUS PRN
Status: DISCONTINUED | OUTPATIENT
Start: 2019-01-10 | End: 2019-01-10 | Stop reason: SDUPTHER

## 2019-01-10 RX ORDER — PROMETHAZINE HYDROCHLORIDE 25 MG/ML
6.25 INJECTION, SOLUTION INTRAMUSCULAR; INTRAVENOUS
Status: DISCONTINUED | OUTPATIENT
Start: 2019-01-10 | End: 2019-01-10 | Stop reason: HOSPADM

## 2019-01-10 RX ORDER — OXYCODONE HYDROCHLORIDE AND ACETAMINOPHEN 5; 325 MG/1; MG/1
1 TABLET ORAL ONCE
Status: COMPLETED | OUTPATIENT
Start: 2019-01-10 | End: 2019-01-10

## 2019-01-10 RX ORDER — PROPOFOL 10 MG/ML
INJECTION, EMULSION INTRAVENOUS PRN
Status: DISCONTINUED | OUTPATIENT
Start: 2019-01-10 | End: 2019-01-10 | Stop reason: SDUPTHER

## 2019-01-10 RX ORDER — PROMETHAZINE HYDROCHLORIDE 25 MG/ML
6.25 INJECTION, SOLUTION INTRAMUSCULAR; INTRAVENOUS
Status: ACTIVE | OUTPATIENT
Start: 2019-01-10 | End: 2019-01-10

## 2019-01-10 RX ORDER — DIPHENHYDRAMINE HYDROCHLORIDE 50 MG/ML
INJECTION INTRAMUSCULAR; INTRAVENOUS
Status: COMPLETED
Start: 2019-01-10 | End: 2019-01-10

## 2019-01-10 RX ORDER — FENTANYL CITRATE 50 UG/ML
25 INJECTION, SOLUTION INTRAMUSCULAR; INTRAVENOUS EVERY 5 MIN PRN
Status: DISCONTINUED | OUTPATIENT
Start: 2019-01-10 | End: 2019-01-10 | Stop reason: HOSPADM

## 2019-01-10 RX ORDER — LABETALOL HYDROCHLORIDE 5 MG/ML
5 INJECTION, SOLUTION INTRAVENOUS EVERY 10 MIN PRN
Status: DISCONTINUED | OUTPATIENT
Start: 2019-01-10 | End: 2019-01-10 | Stop reason: HOSPADM

## 2019-01-10 RX ORDER — MEPERIDINE HYDROCHLORIDE 25 MG/ML
12.5 INJECTION INTRAMUSCULAR; INTRAVENOUS; SUBCUTANEOUS EVERY 5 MIN PRN
Status: DISCONTINUED | OUTPATIENT
Start: 2019-01-10 | End: 2019-01-10 | Stop reason: HOSPADM

## 2019-01-10 RX ORDER — OXYCODONE HYDROCHLORIDE AND ACETAMINOPHEN 5; 325 MG/1; MG/1
1 TABLET ORAL ONCE
Status: DISCONTINUED | OUTPATIENT
Start: 2019-01-10 | End: 2019-01-10 | Stop reason: HOSPADM

## 2019-01-10 RX ADMIN — PROPOFOL 150 MG: 10 INJECTION, EMULSION INTRAVENOUS at 13:19

## 2019-01-10 RX ADMIN — MULTIPLE VITAMINS W/ MINERALS TAB 1 TABLET: TAB at 09:03

## 2019-01-10 RX ADMIN — Medication 10 ML: at 23:57

## 2019-01-10 RX ADMIN — TAMSULOSIN HYDROCHLORIDE 0.8 MG: 0.4 CAPSULE ORAL at 09:03

## 2019-01-10 RX ADMIN — PANTOPRAZOLE SODIUM 40 MG: 40 GRANULE, DELAYED RELEASE ORAL at 05:24

## 2019-01-10 RX ADMIN — PIPERACILLIN SODIUM,TAZOBACTAM SODIUM 3.38 G: 3; .375 INJECTION, POWDER, FOR SOLUTION INTRAVENOUS at 23:57

## 2019-01-10 RX ADMIN — GABAPENTIN 600 MG: 600 TABLET, FILM COATED ORAL at 23:55

## 2019-01-10 RX ADMIN — LIDOCAINE HYDROCHLORIDE 60 MG: 20 INJECTION, SOLUTION EPIDURAL; INFILTRATION; INTRACAUDAL; PERINEURAL at 13:19

## 2019-01-10 RX ADMIN — GABAPENTIN 300 MG: 300 CAPSULE ORAL at 09:03

## 2019-01-10 RX ADMIN — SODIUM CHLORIDE: 900 INJECTION, SOLUTION INTRAVENOUS at 13:13

## 2019-01-10 RX ADMIN — DIPHENHYDRAMINE HYDROCHLORIDE 12.5 MG: 50 INJECTION, SOLUTION INTRAMUSCULAR; INTRAVENOUS at 14:35

## 2019-01-10 RX ADMIN — DIPHENHYDRAMINE HYDROCHLORIDE: 50 INJECTION INTRAMUSCULAR; INTRAVENOUS at 17:25

## 2019-01-10 RX ADMIN — POLYETHYLENE GLYCOL (3350) 17 G: 17 POWDER, FOR SOLUTION ORAL at 09:03

## 2019-01-10 RX ADMIN — METOPROLOL SUCCINATE 12.5 MG: 25 TABLET, EXTENDED RELEASE ORAL at 09:06

## 2019-01-10 RX ADMIN — STANDARDIZED SENNA CONCENTRATE 17.2 MG: 8.6 TABLET ORAL at 23:56

## 2019-01-10 RX ADMIN — FERROUS SULFATE TAB 325 MG (65 MG ELEMENTAL FE) 325 MG: 325 (65 FE) TAB at 09:03

## 2019-01-10 RX ADMIN — PIPERACILLIN SODIUM,TAZOBACTAM SODIUM 3.38 G: 3; .375 INJECTION, POWDER, FOR SOLUTION INTRAVENOUS at 07:03

## 2019-01-10 RX ADMIN — Medication 10 MG: at 23:55

## 2019-01-10 RX ADMIN — PIPERACILLIN SODIUM,TAZOBACTAM SODIUM 3.38 G: 3; .375 INJECTION, POWDER, FOR SOLUTION INTRAVENOUS at 15:08

## 2019-01-10 RX ADMIN — OXYCODONE AND ACETAMINOPHEN 1 TABLET: 5; 325 TABLET ORAL at 15:30

## 2019-01-10 RX ADMIN — ZOLPIDEM TARTRATE 5 MG: 5 TABLET ORAL at 23:55

## 2019-01-10 RX ADMIN — PIPERACILLIN SODIUM,TAZOBACTAM SODIUM 3.38 G: 3; .375 INJECTION, POWDER, FOR SOLUTION INTRAVENOUS at 00:12

## 2019-01-10 RX ADMIN — EPHEDRINE SULFATE 10 MG: 50 INJECTION INTRAVENOUS at 13:31

## 2019-01-10 RX ADMIN — GABAPENTIN 400 MG: 400 CAPSULE ORAL at 15:07

## 2019-01-10 ASSESSMENT — PULMONARY FUNCTION TESTS
PIF_VALUE: 10
PIF_VALUE: 4
PIF_VALUE: 0
PIF_VALUE: 1
PIF_VALUE: 3
PIF_VALUE: 16
PIF_VALUE: 11
PIF_VALUE: 15
PIF_VALUE: 10
PIF_VALUE: 2
PIF_VALUE: 14
PIF_VALUE: 10
PIF_VALUE: 0
PIF_VALUE: 10
PIF_VALUE: 12
PIF_VALUE: 19
PIF_VALUE: 16
PIF_VALUE: 1
PIF_VALUE: 0
PIF_VALUE: 1
PIF_VALUE: 5
PIF_VALUE: 17
PIF_VALUE: 16
PIF_VALUE: 0
PIF_VALUE: 12
PIF_VALUE: 16
PIF_VALUE: 16
PIF_VALUE: 12
PIF_VALUE: 19
PIF_VALUE: 0
PIF_VALUE: 24
PIF_VALUE: 1

## 2019-01-10 ASSESSMENT — PAIN SCALES - GENERAL
PAINLEVEL_OUTOF10: 0
PAINLEVEL_OUTOF10: 3
PAINLEVEL_OUTOF10: 0

## 2019-01-11 VITALS
HEIGHT: 71 IN | SYSTOLIC BLOOD PRESSURE: 115 MMHG | DIASTOLIC BLOOD PRESSURE: 67 MMHG | BODY MASS INDEX: 30.93 KG/M2 | OXYGEN SATURATION: 95 % | WEIGHT: 220.9 LBS | RESPIRATION RATE: 16 BRPM | TEMPERATURE: 98.9 F | HEART RATE: 63 BPM

## 2019-01-11 LAB
ANION GAP SERPL CALCULATED.3IONS-SCNC: 9 MMOL/L (ref 3–16)
BASOPHILS ABSOLUTE: 0 K/UL (ref 0–0.2)
BASOPHILS RELATIVE PERCENT: 0.4 %
BUN BLDV-MCNC: 31 MG/DL (ref 7–20)
CALCIUM SERPL-MCNC: 8.2 MG/DL (ref 8.3–10.6)
CHLORIDE BLD-SCNC: 111 MMOL/L (ref 99–110)
CO2: 21 MMOL/L (ref 21–32)
CREAT SERPL-MCNC: 0.9 MG/DL (ref 0.8–1.3)
EOSINOPHILS ABSOLUTE: 0.6 K/UL (ref 0–0.6)
EOSINOPHILS RELATIVE PERCENT: 6.3 %
GFR AFRICAN AMERICAN: >60
GFR NON-AFRICAN AMERICAN: >60
GLUCOSE BLD-MCNC: 94 MG/DL (ref 70–99)
HCT VFR BLD CALC: 30.2 % (ref 40.5–52.5)
HEMOGLOBIN: 10.1 G/DL (ref 13.5–17.5)
LYMPHOCYTES ABSOLUTE: 0.8 K/UL (ref 1–5.1)
LYMPHOCYTES RELATIVE PERCENT: 8.2 %
MCH RBC QN AUTO: 33.2 PG (ref 26–34)
MCHC RBC AUTO-ENTMCNC: 33.3 G/DL (ref 31–36)
MCV RBC AUTO: 99.8 FL (ref 80–100)
MONOCYTES ABSOLUTE: 0.5 K/UL (ref 0–1.3)
MONOCYTES RELATIVE PERCENT: 4.8 %
NEUTROPHILS ABSOLUTE: 7.6 K/UL (ref 1.7–7.7)
NEUTROPHILS RELATIVE PERCENT: 80.3 %
PDW BLD-RTO: 13.9 % (ref 12.4–15.4)
PLATELET # BLD: 169 K/UL (ref 135–450)
PMV BLD AUTO: 8.6 FL (ref 5–10.5)
POTASSIUM REFLEX MAGNESIUM: 5.1 MMOL/L (ref 3.5–5.1)
RBC # BLD: 3.03 M/UL (ref 4.2–5.9)
SODIUM BLD-SCNC: 141 MMOL/L (ref 136–145)
WBC # BLD: 9.4 K/UL (ref 4–11)

## 2019-01-11 PROCEDURE — 2580000003 HC RX 258: Performed by: INTERNAL MEDICINE

## 2019-01-11 PROCEDURE — 6370000000 HC RX 637 (ALT 250 FOR IP): Performed by: STUDENT IN AN ORGANIZED HEALTH CARE EDUCATION/TRAINING PROGRAM

## 2019-01-11 PROCEDURE — 36415 COLL VENOUS BLD VENIPUNCTURE: CPT

## 2019-01-11 PROCEDURE — 6370000000 HC RX 637 (ALT 250 FOR IP): Performed by: INTERNAL MEDICINE

## 2019-01-11 PROCEDURE — 6360000002 HC RX W HCPCS: Performed by: INTERNAL MEDICINE

## 2019-01-11 PROCEDURE — 6360000002 HC RX W HCPCS: Performed by: STUDENT IN AN ORGANIZED HEALTH CARE EDUCATION/TRAINING PROGRAM

## 2019-01-11 PROCEDURE — 99232 SBSQ HOSP IP/OBS MODERATE 35: CPT | Performed by: INTERNAL MEDICINE

## 2019-01-11 PROCEDURE — 97530 THERAPEUTIC ACTIVITIES: CPT

## 2019-01-11 PROCEDURE — 85025 COMPLETE CBC W/AUTO DIFF WBC: CPT

## 2019-01-11 PROCEDURE — 80048 BASIC METABOLIC PNL TOTAL CA: CPT

## 2019-01-11 RX ORDER — ZOLPIDEM TARTRATE 10 MG/1
10 TABLET ORAL NIGHTLY PRN
Qty: 7 TABLET | Refills: 0 | Status: SHIPPED | OUTPATIENT
Start: 2019-01-11 | End: 2019-01-18

## 2019-01-11 RX ORDER — CIPROFLOXACIN 500 MG/1
500 TABLET, FILM COATED ORAL 2 TIMES DAILY
Qty: 6 TABLET | Refills: 0 | Status: SHIPPED | OUTPATIENT
Start: 2019-01-11 | End: 2019-01-14

## 2019-01-11 RX ADMIN — GABAPENTIN 400 MG: 400 CAPSULE ORAL at 11:45

## 2019-01-11 RX ADMIN — MULTIPLE VITAMINS W/ MINERALS TAB 1 TABLET: TAB at 10:24

## 2019-01-11 RX ADMIN — PANTOPRAZOLE SODIUM 40 MG: 40 GRANULE, DELAYED RELEASE ORAL at 07:08

## 2019-01-11 RX ADMIN — METOPROLOL SUCCINATE 12.5 MG: 25 TABLET, EXTENDED RELEASE ORAL at 10:24

## 2019-01-11 RX ADMIN — TAMSULOSIN HYDROCHLORIDE 0.8 MG: 0.4 CAPSULE ORAL at 10:23

## 2019-01-11 RX ADMIN — ATORVASTATIN CALCIUM 80 MG: 80 TABLET, FILM COATED ORAL at 00:03

## 2019-01-11 RX ADMIN — GABAPENTIN 300 MG: 300 CAPSULE ORAL at 10:24

## 2019-01-11 RX ADMIN — PIPERACILLIN SODIUM,TAZOBACTAM SODIUM 3.38 G: 3; .375 INJECTION, POWDER, FOR SOLUTION INTRAVENOUS at 07:08

## 2019-01-11 RX ADMIN — POLYETHYLENE GLYCOL (3350) 17 G: 17 POWDER, FOR SOLUTION ORAL at 10:23

## 2019-01-11 RX ADMIN — ENOXAPARIN SODIUM 40 MG: 40 INJECTION SUBCUTANEOUS at 10:24

## 2019-01-11 RX ADMIN — FERROUS SULFATE TAB 325 MG (65 MG ELEMENTAL FE) 325 MG: 325 (65 FE) TAB at 10:24

## 2019-01-11 ASSESSMENT — PAIN SCALES - GENERAL
PAINLEVEL_OUTOF10: 0

## 2019-07-09 ENCOUNTER — HOSPITAL ENCOUNTER (EMERGENCY)
Age: 79
Discharge: HOME | End: 2019-07-09
Attending: EMERGENCY MEDICINE
Payer: MEDICARE

## 2019-07-09 VITALS
OXYGEN SATURATION: 96 % | BODY MASS INDEX: 31.36 KG/M2 | HEART RATE: 67 BPM | RESPIRATION RATE: 16 BRPM | WEIGHT: 224 LBS | TEMPERATURE: 98.4 F | DIASTOLIC BLOOD PRESSURE: 60 MMHG | SYSTOLIC BLOOD PRESSURE: 131 MMHG | HEIGHT: 71 IN

## 2019-07-09 DIAGNOSIS — N39.0 URINARY TRACT INFECTION WITHOUT HEMATURIA, SITE UNSPECIFIED: ICD-10-CM

## 2019-07-09 DIAGNOSIS — Z46.6 CATHETER (URINE) CHANGE REQUIRED: Primary | ICD-10-CM

## 2019-07-09 LAB
BACTERIA: ABNORMAL /HPF
BILIRUBIN URINE: NEGATIVE
BLOOD, URINE: ABNORMAL
CLARITY: ABNORMAL
COLOR: YELLOW
EPITHELIAL CELLS, UA: ABNORMAL /HPF
GLUCOSE URINE: NEGATIVE MG/DL
KETONES, URINE: NEGATIVE MG/DL
LEUKOCYTE ESTERASE, URINE: ABNORMAL
MICROSCOPIC EXAMINATION: YES
NITRITE, URINE: POSITIVE
PH UA: 8.5 (ref 5–8)
PROTEIN UA: 100 MG/DL
RBC UA: ABNORMAL /HPF (ref 0–2)
SPECIFIC GRAVITY UA: 1.01 (ref 1–1.03)
URINE TYPE: ABNORMAL
UROBILINOGEN, URINE: 0.2 E.U./DL
WBC UA: ABNORMAL /HPF (ref 0–5)

## 2019-07-09 PROCEDURE — 87077 CULTURE AEROBIC IDENTIFY: CPT

## 2019-07-09 PROCEDURE — 81001 URINALYSIS AUTO W/SCOPE: CPT

## 2019-07-09 PROCEDURE — 87086 URINE CULTURE/COLONY COUNT: CPT

## 2019-07-09 PROCEDURE — 51702 INSERT TEMP BLADDER CATH: CPT

## 2019-07-09 PROCEDURE — 6370000000 HC RX 637 (ALT 250 FOR IP): Performed by: EMERGENCY MEDICINE

## 2019-07-09 PROCEDURE — 99283 EMERGENCY DEPT VISIT LOW MDM: CPT

## 2019-07-09 PROCEDURE — 87186 SC STD MICRODIL/AGAR DIL: CPT

## 2019-07-09 RX ORDER — CIPROFLOXACIN 500 MG/1
500 TABLET, FILM COATED ORAL 2 TIMES DAILY
Qty: 14 TABLET | Refills: 0 | Status: SHIPPED | OUTPATIENT
Start: 2019-07-09 | End: 2019-07-16

## 2019-07-09 RX ORDER — CIPROFLOXACIN 500 MG/1
500 TABLET, FILM COATED ORAL ONCE
Status: COMPLETED | OUTPATIENT
Start: 2019-07-09 | End: 2019-07-09

## 2019-07-09 RX ADMIN — CIPROFLOXACIN HYDROCHLORIDE 500 MG: 500 TABLET, FILM COATED ORAL at 21:08

## 2019-07-09 ASSESSMENT — ENCOUNTER SYMPTOMS
ALLERGIC/IMMUNOLOGIC NEGATIVE: 1
ABDOMINAL PAIN: 0

## 2019-07-10 NOTE — ED PROVIDER NOTES
Date of evaluation: 7/9/2019    Chief Complaint   Other (schneider catheter not draining)      Nursing Notes, Past Medical Hx, Past Surgical Hx, Social Hx, Allergies, and Family Hx were reviewed. History of Present Illness     Conner Yoo is a 69 y.o. male who presents drainage of urine around his urinary catheter. He states it is not draining into the bag but coming around around his urethral meatus. It started this morning. He has a neurogenic bladder he has chronic Schneider. This was exchanged on June 14. He has had no fevers chills nausea or vomiting no other symptoms he is simply here for catheter exchange    Review of Systems     Review of Systems   Constitutional: Negative for activity change, appetite change and chills. Gastrointestinal: Negative for abdominal pain. Endocrine: Negative. Genitourinary: Negative for genital sores, hematuria and scrotal swelling. Allergic/Immunologic: Negative. Neurological: Negative. All other systems reviewed and are negative. Past Medical, Surgical, Family, and Social History     He has a past medical history of BPH (benign prostatic hyperplasia), Carotid stenosis, Cellulitis, Chronic back pain, Diverticular disease, Erectile dysfunction, GERD (gastroesophageal reflux disease), History of atrial fibrillation, Hypercholesteremia, Hypertension, Lower GI bleed, Neuromuscular disorder ([address] Renal insufficiency, Risk for falls, Tinea corporis, and Vitamin B12 deficiency. 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 His family history includes Heart Disease in his father. He reports that he quit smoking about 49 years ago. He has never used smokeless tobacco. He reports that he does not drink alcohol or use drugs.     Medications     Previous Medications    ASPIRIN 325 MG TABLET    Take 325 mg by mouth daily ATORVASTATIN (LIPITOR) 80 MG TABLET    Take 80 mg by mouth nightly. BACLOFEN (GABLOFEN) 17070 MCG/20ML SOSY INJECTION    40,000 mcg by Intrathecal route infusing    BISMUTH SUBSALICYLATE (PEPTO BISMOL) 262 MG/15ML SUSPENSION    Take 15 mLs by mouth every 6 hours as needed for Indigestion    FERROUS SULFATE 325 (65 FE) MG TABLET    Take 325 mg by mouth daily (with breakfast)    FUROSEMIDE (LASIX) 20 MG TABLET    Take 20 mg by mouth daily    GABAPENTIN (NEURONTIN) 600 MG TABLET    Take 600 mg by mouth daily. Betha Lute GABAPENTIN (NEURONTIN) 600 MG TABLET    Take 1,200 mg by mouth Daily with lunch. Betha Lute GABAPENTIN (NEURONTIN) 600 MG TABLET    Take 1,800 mg by mouth nightly. .    ISOSORBIDE MONONITRATE (IMDUR) 30 MG EXTENDED RELEASE TABLET    Take 1 tablet by mouth nightly    MELATONIN 10 MG TABS    Take 10 mg by mouth nightly    METOPROLOL (TOPROL-XL) 25 MG XL TABLET    Take 0.5 tablets by mouth daily Hold for HR<60, SBP<100    MULTIPLE VITAMINS-MINERALS (THERAPEUTIC MULTIVITAMIN-MINERALS) TABLET    Take 1 tablet by mouth daily    NIACIN PO    Take 1 tablet by mouth daily    ONDANSETRON (ZOFRAN ODT) 4 MG DISINTEGRATING TABLET    Take 1 tablet by mouth every 8 hours as needed for Nausea    PANTOPRAZOLE SODIUM (PROTONIX) 40 MG PACK PACKET    Take 40 mg by mouth every morning (before breakfast)    SENNOSIDES-DOCUSATE SODIUM (SENOKOT-S) 8.6-50 MG TABLET    Take 1 tablet by mouth daily as needed for Constipation    TAMSULOSIN (FLOMAX) 0.4 MG CAPSULE    Take 2 capsules by mouth daily    ZOLPIDEM (AMBIEN) 10 MG TABLET    Take 10 mg by mouth nightly as needed for Sleep. Allergies     He is allergic to bactrim [sulfamethoxazole-trimethoprim]. Physical Exam     INITIAL VITALS: /60   Pulse 67   Temp 98.4 °F (36.9 °C) (Oral)   Resp 16   Ht 5' 11\" (1.803 m)   Wt 224 lb (101.6 kg)   SpO2 96%   BMI 31.24 kg/m²    Physical Exam   Constitutional: He is oriented to person, place, and time.  He appears well-developed times daily for 7 days     Dispense:  14 tablet     Refill:  0            CONSULTS:  None    MEDICAL DECISION MAKING     Priti Randall is a 69 y.o. male resents with a malfunctioning Iglesias catheter was clogged. This was exchanged. Urinalysis showed large leukocyte esterase positive nitrite. It was sent for culture. Based on his previous cultures we will place him on Cipro and follow-up with his primary care for culture results. The catheter is draining fine he has no other complaints      Clinical Impression     1. Catheter (urine) change required    2.  Urinary tract infection without hematuria, site unspecified        Disposition/Plan     PATIENT REFERRED TO:  Josem Rubinstein      to get culture results      DISCHARGE MEDICATIONS:  New Prescriptions    CIPROFLOXACIN (CIPRO) 500 MG TABLET    Take 1 tablet by mouth 2 times daily for 7 days       DISPOSITION Discharge - Pending Orders Complete 07/09/2019 08:55:16 PM      Joanne Doherty MD  07/09/19 7584

## 2019-07-11 LAB
ORGANISM: ABNORMAL
URINE CULTURE, ROUTINE: ABNORMAL
URINE CULTURE, ROUTINE: ABNORMAL

## 2019-08-02 ENCOUNTER — OFFICE VISIT (OUTPATIENT)
Dept: CARDIOLOGY CLINIC | Age: 79
End: 2019-08-02
Payer: MEDICARE

## 2019-08-02 VITALS
BODY MASS INDEX: 30.68 KG/M2 | WEIGHT: 220 LBS | SYSTOLIC BLOOD PRESSURE: 122 MMHG | DIASTOLIC BLOOD PRESSURE: 65 MMHG | HEART RATE: 70 BPM

## 2019-08-02 DIAGNOSIS — R60.0 LOCALIZED EDEMA: Primary | ICD-10-CM

## 2019-08-02 DIAGNOSIS — I25.10 CORONARY ARTERY DISEASE INVOLVING NATIVE CORONARY ARTERY WITHOUT ANGINA PECTORIS, UNSPECIFIED WHETHER NATIVE OR TRANSPLANTED HEART: ICD-10-CM

## 2019-08-02 PROBLEM — R60.9 EDEMA: Status: ACTIVE | Noted: 2019-08-02

## 2019-08-02 PROCEDURE — G8428 CUR MEDS NOT DOCUMENT: HCPCS | Performed by: NURSE PRACTITIONER

## 2019-08-02 PROCEDURE — 99214 OFFICE O/P EST MOD 30 MIN: CPT | Performed by: NURSE PRACTITIONER

## 2019-08-02 PROCEDURE — 4040F PNEUMOC VAC/ADMIN/RCVD: CPT | Performed by: NURSE PRACTITIONER

## 2019-08-02 PROCEDURE — G8417 CALC BMI ABV UP PARAM F/U: HCPCS | Performed by: NURSE PRACTITIONER

## 2019-08-02 PROCEDURE — G8598 ASA/ANTIPLAT THER USED: HCPCS | Performed by: NURSE PRACTITIONER

## 2019-08-02 PROCEDURE — 1123F ACP DISCUSS/DSCN MKR DOCD: CPT | Performed by: NURSE PRACTITIONER

## 2019-08-02 PROCEDURE — 1036F TOBACCO NON-USER: CPT | Performed by: NURSE PRACTITIONER

## 2019-08-02 RX ORDER — SPIRONOLACTONE 25 MG/1
12.5 TABLET ORAL DAILY
Qty: 30 TABLET | Refills: 3 | Status: SHIPPED | OUTPATIENT
Start: 2019-08-02 | End: 2019-12-17 | Stop reason: CLARIF

## 2019-08-02 RX ORDER — POTASSIUM CHLORIDE 20 MEQ/1
TABLET, EXTENDED RELEASE ORAL
COMMUNITY
Start: 2019-07-16 | End: 2019-08-02 | Stop reason: ALTCHOICE

## 2019-08-02 ASSESSMENT — ENCOUNTER SYMPTOMS
GASTROINTESTINAL NEGATIVE: 1
EYES NEGATIVE: 1
RESPIRATORY NEGATIVE: 1
SHORTNESS OF BREATH: 0

## 2019-08-02 NOTE — PATIENT INSTRUCTIONS
Instructions:   1. Medications: Spironolactone 12.5 mg daily. Stop potassium. 2. Labs: One week  3. Tests: Echocardiogram   4. Lifestyle Recommendations: Cut out processed foods and eat a plant based diet with lean sources of protein, exercise at least 3 times a week for at least 20 minutes and incorporate weight resistance exercises.   Add an EPA only fish oil supplement if you are not taking one.   5. Follow up: Dr. Johann Schwartz 3-4 months

## 2019-08-02 NOTE — PROGRESS NOTES
[facility]      Primary Care Doctor:  Og Oliveros  Primary Cardiologist: Germania Correa    Chief Complaint:  Edema    History of Present Illness:  Jeff Barrera is a 69 y.o. male with PMH CAd, CABG 2013, GERD, AF, HLD, HTN, GIB  who presents today for an interval exam.  He has c/o edema for the past 6 months. He has chronic schneider catheter for neurogenic bladder. He denies chest pain, dyspnea, palpitations, orthopnea, PND, exertional chest pressure/discomfort, fatigue, early saiety, syncope. EF: 55%  Cardiac Imaging:Echo 5/26/17  Left ventricle size is normal. Normal left ventricular wall thickness. Left   ventricular function is normal with ejection fraction estimated at 55%.   Abnormal (paradoxical) septal motion is present. Normal diastolic function.   Mitral annular calcification is present. Mild mitral regurgitation is   present. Aortic valve appears sclerotic but opens adequately. The left   atrium is mildly dilated. Mild tricuspid regurgitation with RVSP estimated   at 43 mmHg. Activity: wheelchair bound, besides pivoting. Can you walk 1-2 blocks or do a moderate amount of house/yard work? No      STEPHANI No     Pt Education: The patient has received education on the following topics: dietary guidelines, heart medications, the importance of physical activity, symptom management and reduction of cardiac risk factors. Past Medical History:   has a past medical history of BPH (benign prostatic hyperplasia), Carotid stenosis, Cellulitis, Chronic back pain, Diverticular disease, Erectile dysfunction, GERD (gastroesophageal reflux disease), History of atrial fibrillation, Hypercholesteremia, Hypertension, Lower GI bleed, Neuromuscular disorder ([address] Renal insufficiency, Risk for falls, Tinea corporis, and Vitamin B12 deficiency. SurgicalHistory:   has a past surgical history that includes back surgery (2006);  Foot surgery; Coronary artery bypass graft (12/13/2013); hip surgery (Right, subsalicylate (PEPTO BISMOL) 262 MG/15ML suspension Take 15 mLs by mouth every 6 hours as needed for Indigestion    Historical Provider, MD   metoprolol (TOPROL-XL) 25 MG XL tablet Take 0.5 tablets by mouth daily Hold for HR<60, SBP<100 5/4/15   Otilia Lazo MD   gabapentin (NEURONTIN) 600 MG tablet Take 600 mg by mouth daily. Moriah Richey Historical Provider, MD   atorvastatin (LIPITOR) 80 MG tablet Take 80 mg by mouth nightly. Historical Provider, MD   zolpidem (AMBIEN) 10 MG tablet Take 10 mg by mouth nightly as needed for Sleep. Historical Provider, MD        Allergies:  Bactrim [sulfamethoxazole-trimethoprim]     ROS:   Review of Systems   Constitutional: Negative. Negative for activity change, appetite change and fatigue. HENT: Negative. Eyes: Negative. Respiratory: Negative. Negative for shortness of breath. Cardiovascular: Positive for leg swelling. Negative for chest pain and palpitations. Gastrointestinal: Negative. Genitourinary: Positive for difficulty urinating. Chronic schneider catheter for neurogenic bladder   Musculoskeletal: Negative. Skin: Positive for wound. Superficial to lower extremity   Neurological: Positive for numbness. Negative for dizziness and light-headedness. Chronic bilateral lower extremity    Psychiatric/Behavioral: Negative. Physical Examination:    Vitals:    08/02/19 1056   BP: 122/65   Pulse: 70   Weight: 220 lb (99.8 kg)           Physical Exam   Constitutional: He is oriented to person, place, and time. He appears well-developed and well-nourished. HENT:   Head: Normocephalic and atraumatic. Eyes: Pupils are equal, round, and reactive to light. EOM are normal.   Neck: Normal range of motion. Neck supple. Cardiovascular: Normal rate, regular rhythm and normal heart sounds. Pulmonary/Chest: Effort normal.   Musculoskeletal: He exhibits edema.    3+ pitting bilateral lower extremities   Neurological: He is alert and oriented to person, place, and time. Skin: Skin is warm and dry. Psychiatric: He has a normal mood and affect. His behavior is normal. Judgment and thought content normal.       Lab Data:    CBC:   Lab Results   Component Value Date    WBC 9.4 01/11/2019    WBC 8.5 01/10/2019    WBC 8.6 01/09/2019    RBC 3.03 01/11/2019    RBC 3.12 01/10/2019    RBC 3.23 01/09/2019    HGB 10.1 01/11/2019    HGB 10.5 01/10/2019    HGB 10.6 01/09/2019    HCT 30.2 01/11/2019    HCT 31.3 01/10/2019    HCT 32.7 01/09/2019    MCV 99.8 01/11/2019    .2 01/10/2019    .1 01/09/2019    RDW 13.9 01/11/2019    RDW 14.1 01/10/2019    RDW 14.4 01/09/2019     01/11/2019     01/10/2019     01/09/2019     BMP:  Lab Results   Component Value Date     01/11/2019     01/10/2019     01/09/2019    K 5.1 01/11/2019    K 4.7 01/10/2019    K 4.6 01/09/2019     01/11/2019     01/10/2019     01/09/2019    CO2 21 01/11/2019    CO2 20 01/10/2019    CO2 19 01/09/2019    PHOS 3.1 11/10/2014    PHOS 3.1 11/09/2014    PHOS 3.4 12/09/2013    BUN 31 01/11/2019    BUN 35 01/10/2019    BUN 38 01/09/2019    CREATININE 0.9 01/11/2019    CREATININE 1.0 01/10/2019    CREATININE 1.3 01/09/2019     BNP: No results found for: PROBNP       Assessment/Plan:    Encounter Diagnoses   Name     Coronary artery disease involving native coronary artery without angina pectoris, unspecified whether native or transplanted heart On ASA, lasix, BB, Lipitor; No c/o cp.  Localized edema Echocardiogram, last echo from 2017. Start rosemary, labs in one week. Instructions:   1. Medications: Spironolactone 12.5 mg daily. Stop potassium. 2. Labs: One week  3. Tests: Echocardiogram   4. Lifestyle Recommendations: Cut out processed foods and eat a plant based diet with lean sources of protein, exercise at least 3 times a week for at least 20 minutes and incorporate weight resistance exercises.   Add an EPA only

## 2019-08-27 ENCOUNTER — HOSPITAL ENCOUNTER (EMERGENCY)
Age: 79
Discharge: HOME | End: 2019-08-28
Attending: EMERGENCY MEDICINE
Payer: MEDICARE

## 2019-08-27 DIAGNOSIS — N39.0 URINARY TRACT INFECTION ASSOCIATED WITH INDWELLING URETHRAL CATHETER, INITIAL ENCOUNTER (HCC): Primary | ICD-10-CM

## 2019-08-27 DIAGNOSIS — T83.511A URINARY TRACT INFECTION ASSOCIATED WITH INDWELLING URETHRAL CATHETER, INITIAL ENCOUNTER (HCC): Primary | ICD-10-CM

## 2019-08-27 DIAGNOSIS — L89.41 PRESSURE INJURY OF CONTIGUOUS REGION INVOLVING BACK AND BUTTOCK, STAGE 1, UNSPECIFIED LATERALITY: ICD-10-CM

## 2019-08-27 LAB
ANION GAP SERPL CALCULATED.3IONS-SCNC: 9 MMOL/L (ref 3–16)
BACTERIA: ABNORMAL /HPF
BASOPHILS ABSOLUTE: 0.1 K/UL (ref 0–0.2)
BASOPHILS RELATIVE PERCENT: 1.3 %
BILIRUBIN URINE: ABNORMAL
BLOOD, URINE: ABNORMAL
BUN BLDV-MCNC: 26 MG/DL (ref 7–20)
CALCIUM SERPL-MCNC: 8.7 MG/DL (ref 8.3–10.6)
CHLORIDE BLD-SCNC: 105 MMOL/L (ref 99–110)
CLARITY: ABNORMAL
CO2: 27 MMOL/L (ref 21–32)
COLOR: YELLOW
CREAT SERPL-MCNC: 0.9 MG/DL (ref 0.8–1.3)
EOSINOPHILS ABSOLUTE: 0.6 K/UL (ref 0–0.6)
EOSINOPHILS RELATIVE PERCENT: 9.8 %
GFR AFRICAN AMERICAN: >60
GFR NON-AFRICAN AMERICAN: >60
GLUCOSE BLD-MCNC: 119 MG/DL (ref 70–99)
GLUCOSE URINE: ABNORMAL MG/DL
HCT VFR BLD CALC: 38.9 % (ref 40.5–52.5)
HEMOGLOBIN: 12.8 G/DL (ref 13.5–17.5)
KETONES, URINE: ABNORMAL MG/DL
LEUKOCYTE ESTERASE, URINE: ABNORMAL
LYMPHOCYTES ABSOLUTE: 1 K/UL (ref 1–5.1)
LYMPHOCYTES RELATIVE PERCENT: 17.6 %
MCH RBC QN AUTO: 32.6 PG (ref 26–34)
MCHC RBC AUTO-ENTMCNC: 33 G/DL (ref 31–36)
MCV RBC AUTO: 98.8 FL (ref 80–100)
MICROSCOPIC EXAMINATION: YES
MONOCYTES ABSOLUTE: 0.5 K/UL (ref 0–1.3)
MONOCYTES RELATIVE PERCENT: 9.3 %
NEUTROPHILS ABSOLUTE: 3.6 K/UL (ref 1.7–7.7)
NEUTROPHILS RELATIVE PERCENT: 62 %
NITRITE, URINE: POSITIVE
PDW BLD-RTO: 15 % (ref 12.4–15.4)
PH UA: ABNORMAL (ref 5–8)
PLATELET # BLD: 183 K/UL (ref 135–450)
PMV BLD AUTO: 9.6 FL (ref 5–10.5)
POTASSIUM REFLEX MAGNESIUM: 4.7 MMOL/L (ref 3.5–5.1)
PROTEIN UA: ABNORMAL MG/DL
RBC # BLD: 3.93 M/UL (ref 4.2–5.9)
RBC UA: ABNORMAL /HPF (ref 0–2)
SODIUM BLD-SCNC: 141 MMOL/L (ref 136–145)
SPECIFIC GRAVITY UA: >=1.03 (ref 1–1.03)
URINE REFLEX TO CULTURE: YES
URINE TYPE: ABNORMAL
UROBILINOGEN, URINE: ABNORMAL E.U./DL
WBC # BLD: 5.9 K/UL (ref 4–11)
WBC UA: >100 /HPF (ref 0–5)

## 2019-08-27 PROCEDURE — 81001 URINALYSIS AUTO W/SCOPE: CPT

## 2019-08-27 PROCEDURE — 99284 EMERGENCY DEPT VISIT MOD MDM: CPT

## 2019-08-27 PROCEDURE — 96365 THER/PROPH/DIAG IV INF INIT: CPT

## 2019-08-27 PROCEDURE — 6360000002 HC RX W HCPCS: Performed by: PHYSICIAN ASSISTANT

## 2019-08-27 PROCEDURE — 2580000003 HC RX 258: Performed by: PHYSICIAN ASSISTANT

## 2019-08-27 PROCEDURE — 87086 URINE CULTURE/COLONY COUNT: CPT

## 2019-08-27 PROCEDURE — 80048 BASIC METABOLIC PNL TOTAL CA: CPT

## 2019-08-27 PROCEDURE — 85025 COMPLETE CBC W/AUTO DIFF WBC: CPT

## 2019-08-27 PROCEDURE — 51702 INSERT TEMP BLADDER CATH: CPT

## 2019-08-27 RX ADMIN — DEXTROSE MONOHYDRATE 1 G: 5 INJECTION INTRAVENOUS at 23:55

## 2019-08-28 VITALS
DIASTOLIC BLOOD PRESSURE: 60 MMHG | HEART RATE: 60 BPM | OXYGEN SATURATION: 100 % | SYSTOLIC BLOOD PRESSURE: 141 MMHG | RESPIRATION RATE: 14 BRPM | TEMPERATURE: 98.5 F

## 2019-08-28 RX ORDER — CIPROFLOXACIN 500 MG/1
500 TABLET, FILM COATED ORAL 2 TIMES DAILY
Qty: 14 TABLET | Refills: 0 | Status: SHIPPED | OUTPATIENT
Start: 2019-08-28 | End: 2019-09-04

## 2019-08-28 NOTE — ED PROVIDER NOTES
ED Attending Attestation Note     Date of evaluation: 8/27/2019    This patient was seen by the advance practice provider. I have seen and examined the patient, agree with the workup, evaluation, management and diagnosis. The care plan has been discussed. My assessment reveals a 67year old male with HTN, CAD s/p CABG, GERD, atrial fibrillation, diverticular disease, and prior history of GIB who presented to the ED with dark colored stools. He had an EGD in 2017 with barretts esophagus, duodenitis, and H pylori. Colonoscopy with hemorrhoids, colonic diverticulosis, and colon polyps. He takes a full dose ASA. On my evaluation he is chronically ill appearing in no acute distress with a regular rate and rhythm and clear lungs. His abdomen is nontender. He has a baclofen pump and a chronic schneider.           Alex Rondon MD  08/27/19 7273

## 2019-08-28 NOTE — ED NOTES
Bed: B15-15  Expected date:   Expected time:   Means of arrival:   Comments:  [facility]  08/27/19 0879

## 2019-08-29 LAB — URINE CULTURE, ROUTINE: NORMAL

## 2019-08-30 ENCOUNTER — APPOINTMENT (OUTPATIENT)
Dept: CT IMAGING | Age: 79
End: 2019-08-30
Payer: MEDICARE

## 2019-08-30 ENCOUNTER — HOSPITAL ENCOUNTER (EMERGENCY)
Age: 79
Discharge: HOME | End: 2019-08-30
Attending: EMERGENCY MEDICINE
Payer: MEDICARE

## 2019-08-30 VITALS
RESPIRATION RATE: 16 BRPM | BODY MASS INDEX: 28.73 KG/M2 | HEART RATE: 64 BPM | TEMPERATURE: 98 F | SYSTOLIC BLOOD PRESSURE: 152 MMHG | DIASTOLIC BLOOD PRESSURE: 70 MMHG | OXYGEN SATURATION: 96 % | WEIGHT: 206 LBS

## 2019-08-30 DIAGNOSIS — R51.9 ACUTE NONINTRACTABLE HEADACHE, UNSPECIFIED HEADACHE TYPE: Primary | ICD-10-CM

## 2019-08-30 LAB
ANION GAP SERPL CALCULATED.3IONS-SCNC: 8 MMOL/L (ref 3–16)
BUN BLDV-MCNC: 25 MG/DL (ref 7–20)
CALCIUM SERPL-MCNC: 8.5 MG/DL (ref 8.3–10.6)
CHLORIDE BLD-SCNC: 106 MMOL/L (ref 99–110)
CO2: 27 MMOL/L (ref 21–32)
CREAT SERPL-MCNC: 1 MG/DL (ref 0.8–1.3)
EKG ATRIAL RATE: 53 BPM
EKG DIAGNOSIS: NORMAL
EKG P AXIS: 59 DEGREES
EKG P-R INTERVAL: 244 MS
EKG Q-T INTERVAL: 438 MS
EKG QRS DURATION: 90 MS
EKG QTC CALCULATION (BAZETT): 410 MS
EKG R AXIS: 14 DEGREES
EKG T AXIS: 49 DEGREES
EKG VENTRICULAR RATE: 53 BPM
GFR AFRICAN AMERICAN: >60
GFR NON-AFRICAN AMERICAN: >60
GLUCOSE BLD-MCNC: 113 MG/DL (ref 70–99)
HCT VFR BLD CALC: 37.7 % (ref 40.5–52.5)
HEMOGLOBIN: 12.3 G/DL (ref 13.5–17.5)
MCH RBC QN AUTO: 32.4 PG (ref 26–34)
MCHC RBC AUTO-ENTMCNC: 32.6 G/DL (ref 31–36)
MCV RBC AUTO: 99.3 FL (ref 80–100)
PDW BLD-RTO: 15.1 % (ref 12.4–15.4)
PLATELET # BLD: 169 K/UL (ref 135–450)
PMV BLD AUTO: 9.3 FL (ref 5–10.5)
POTASSIUM REFLEX MAGNESIUM: 5 MMOL/L (ref 3.5–5.1)
RBC # BLD: 3.8 M/UL (ref 4.2–5.9)
SODIUM BLD-SCNC: 141 MMOL/L (ref 136–145)
WBC # BLD: 6.6 K/UL (ref 4–11)

## 2019-08-30 PROCEDURE — 93005 ELECTROCARDIOGRAM TRACING: CPT | Performed by: EMERGENCY MEDICINE

## 2019-08-30 PROCEDURE — 99284 EMERGENCY DEPT VISIT MOD MDM: CPT

## 2019-08-30 PROCEDURE — 80048 BASIC METABOLIC PNL TOTAL CA: CPT

## 2019-08-30 PROCEDURE — 85027 COMPLETE CBC AUTOMATED: CPT

## 2019-08-30 PROCEDURE — 6370000000 HC RX 637 (ALT 250 FOR IP): Performed by: EMERGENCY MEDICINE

## 2019-08-30 PROCEDURE — 70450 CT HEAD/BRAIN W/O DYE: CPT

## 2019-08-30 RX ORDER — ACETAMINOPHEN 325 MG/1
650 TABLET ORAL ONCE
Status: COMPLETED | OUTPATIENT
Start: 2019-08-30 | End: 2019-08-30

## 2019-08-30 RX ORDER — METOCLOPRAMIDE 10 MG/1
10 TABLET ORAL ONCE
Status: COMPLETED | OUTPATIENT
Start: 2019-08-30 | End: 2019-08-30

## 2019-08-30 RX ADMIN — METOCLOPRAMIDE 10 MG: 10 TABLET ORAL at 19:22

## 2019-08-30 RX ADMIN — ACETAMINOPHEN 650 MG: 325 TABLET ORAL at 19:22

## 2019-08-30 ASSESSMENT — PAIN DESCRIPTION - PAIN TYPE: TYPE: ACUTE PAIN;CHRONIC PAIN

## 2019-08-30 ASSESSMENT — PAIN DESCRIPTION - FREQUENCY: FREQUENCY: INTERMITTENT

## 2019-08-30 ASSESSMENT — ENCOUNTER SYMPTOMS: SHORTNESS OF BREATH: 0

## 2019-08-30 ASSESSMENT — PAIN SCALES - GENERAL
PAINLEVEL_OUTOF10: 5
PAINLEVEL_OUTOF10: 5

## 2019-08-30 ASSESSMENT — PAIN DESCRIPTION - LOCATION: LOCATION: HEAD

## 2019-08-30 ASSESSMENT — PAIN DESCRIPTION - PROGRESSION: CLINICAL_PROGRESSION: NOT CHANGED

## 2019-08-30 ASSESSMENT — PAIN DESCRIPTION - ONSET: ONSET: ON-GOING

## 2019-08-30 ASSESSMENT — PAIN DESCRIPTION - DESCRIPTORS: DESCRIPTORS: ACHING

## 2019-08-30 NOTE — ED PROVIDER NOTES
mouth every 8 hours as needed for Nausea, Disp-20 tablet, R-0Print      bismuth subsalicylate (PEPTO BISMOL) 262 MG/15ML suspension Take 15 mLs by mouth every 6 hours as needed for IndigestionHistorical Med      metoprolol (TOPROL-XL) 25 MG XL tablet Take 0.5 tablets by mouth daily Hold for HR<60, SBP<100, Disp-30 tablet, R-3Print      !! gabapentin (NEURONTIN) 600 MG tablet Take 600 mg by mouth daily. Leticiaizzy [place] Historical Med      atorvastatin (LIPITOR) 80 MG tablet Take 80 mg by mouth nightly. Historical Med      zolpidem (AMBIEN) 10 MG tablet Take 10 mg by mouth nightly as needed for Sleep. Historical Med       !! - Potential duplicate medications found. Please discuss with provider. Allergies     He is allergic to bactrim [sulfamethoxazole-trimethoprim]. Physical Exam     INITIAL VITALS: BP: (!) 147/55, Temp: 98 °F (36.7 °C), Pulse: 61, Resp: 18, SpO2: 98 %     Nursing note and vitals reviewed. General:  Adult male, alert and appropriately interactive. In no distress. HENT: Normocephalic and atraumatic. External ears normal. Nose appears normal externally. Eyes: Conjunctivae normal. No scleral icterus. PERRL, EOMI, no nystagmus. Neck: Neck supple. No tracheal deviationpresent. CV: Normal rate. Regular rhythm, S1/S2 auscultated. No murmurs, gallops or rubs. Chest: Effort normal. Breath sounds clear to auscultation bilaterally. No wheezes. No rales or rhonchi. Abdominal: Soft. No distension. No tenderness. No rebound or guarding. No masses. No peritoneal signs. Back-pump palpated in right lower quadrant without tenderness or surrounding fluctuance. Musculoskeletal: Severe BLE lymphedema, posterior left calf wound clean and without erythema or drainage. No gross deformities. Neurological: Alert and oriented to person, place, and time. Face symmetric, speech without dysarthria or obvious aphasia. PERRL, EOMI. No nystagmus. Sensation intact in V1, V2, V3 nerve distributions bilaterally.  Facial 64, Resp: 16, SpO2: 96 %     Procedures   Procedures      ED Course     Nursing Notes, Past Medical Hx, Past Surgical Hx, Social Hx,Allergies, and Family Hx were reviewed. Patient was given the following medications:  Orders Placed This Encounter   Medications    metoclopramide (REGLAN) tablet 10 mg    acetaminophen (TYLENOL) tablet 650 mg       CONSULTS:  None    MEDICAL DECISIONMAKING / ASSESSMENT / PLAN     Abraham Epstein is a 67 y.o. male with history as document above presenting with acute headache. On arrival he is in no distress and vital signs reassuring. His physical examination demonstrates no neurologic deficits. Given the severity of headache and recent onset, CT of the head was obtained within 6 hours of onset and demonstrated no acute intracranial hemorrhage or other acute findings. Patient was given Tylenol and Reglan with some improvement in his headache. No other significant concerning symptoms or findings today that require further emergent evaluation. Patient discharged in stable condition with written and verbal instructions for which to return to the ED. Clinical Impression     1.  Acute nonintractable headache, unspecified headache type        Disposition     PATIENT REFERRED TO:  [facility] Emergency Department  [address] [place] [address]  [phone]    If symptoms worsen    Duglas Del Angel    Schedule an appointment as soon as possible for a visit in 1 week  For reexamination      DISCHARGE MEDICATIONS:  Discharge Medication List as of 8/30/2019  8:26 PM          DISPOSITION Decision To Discharge 08/30/2019 08:26:06 Calin Mari MD  08/30/19 [id number]

## 2019-08-30 NOTE — ED NOTES
Bed: A09-09  Expected date:   Expected time:   Means of arrival:   Comments:  43 Pricila Vasquez RN  08/30/19 9788

## 2019-09-04 ASSESSMENT — ENCOUNTER SYMPTOMS
TROUBLE SWALLOWING: 0
SHORTNESS OF BREATH: 0
WHEEZING: 0
VOMITING: 0
ABDOMINAL PAIN: 0
NAUSEA: 0
CHEST TIGHTNESS: 0
DIARRHEA: 0
ABDOMINAL DISTENTION: 0
COLOR CHANGE: 0
RHINORRHEA: 0
EYE PAIN: 0
COUGH: 0
BLOOD IN STOOL: 1
SORE THROAT: 0

## 2019-09-04 NOTE — ED PROVIDER NOTES
[facility]          PHYSICIAN ASSISTANT NOTE       Date of evaluation: 8/27/2019    Chief Complaint     Rectal Bleeding (noticed yesterday)      History of Present Illness     Dagoberto Fried is a 67 y.o. male with a past medical history as noted below who presents to the Emergency Department with a complaint of possible rectal bleeding. The patient for the past 2 days he has noticed very dark stools and he is concerned that he may have a rectal bleed. He reports he had a history of lower GI bleeding in the past.   He has not seen any bright red blood per rectum. The patient denies any abdominal pain, nausea, vomiting, loose stools, lightheadedness or dizziness. He denies any other significant complaints. He is not on any anticoagulants. He does take a full dose of aspirin. No recent bismuth sulfates usage. Review of Systems     Review of Systems   Constitutional: Negative for chills, diaphoresis, fatigue and fever. HENT: Negative for congestion, postnasal drip, rhinorrhea, sore throat and trouble swallowing. Eyes: Negative for pain and visual disturbance. Respiratory: Negative for cough, chest tightness, shortness of breath and wheezing. Cardiovascular: Negative for chest pain, palpitations and leg swelling. Gastrointestinal: Positive for blood in stool (Concern for). Negative for abdominal distention, abdominal pain, diarrhea, nausea and vomiting. Endocrine: Negative for polydipsia and polyuria. Genitourinary: Negative for dysuria. Musculoskeletal: Negative for myalgias, neck pain and neck stiffness. Skin: Negative for color change, pallor and rash. Neurological: Negative for dizziness, weakness, light-headedness and headaches. Hematological: Does not bruise/bleed easily. Psychiatric/Behavioral: Negative for confusion, hallucinations, self-injury and suicidal ideas. All other systems reviewed and are negative.       Past Medical, by mouth dailyHistorical Med      NIACIN PO Take 1 tablet by mouth dailyHistorical Med      Multiple Vitamins-Minerals (THERAPEUTIC MULTIVITAMIN-MINERALS) tablet Take 1 tablet by mouth dailyHistorical Med      aspirin 325 MG tablet Take 325 mg by mouth dailyHistorical Med      baclofen (GABLOFEN) 06793 MCG/20ML SOSY injection 40,000 mcg by Intrathecal route infusingHistorical Med      ondansetron (ZOFRAN ODT) 4 MG disintegrating tablet Take 1 tablet by mouth every 8 hours as needed for Nausea, Disp-20 tablet, R-0Print      bismuth subsalicylate (PEPTO BISMOL) 262 MG/15ML suspension Take 15 mLs by mouth every 6 hours as needed for IndigestionHistorical Med      metoprolol (TOPROL-XL) 25 MG XL tablet Take 0.5 tablets by mouth daily Hold for HR<60, SBP<100, Disp-30 tablet, R-3Print      !! gabapentin (NEURONTIN) 600 MG tablet Take 600 mg by mouth daily. Annamary Ripa Historical Med      atorvastatin (LIPITOR) 80 MG tablet Take 80 mg by mouth nightly. Historical Med      zolpidem (AMBIEN) 10 MG tablet Take 10 mg by mouth nightly as needed for Sleep. Historical Med       !! - Potential duplicate medications found. Please discuss with provider. Allergies     He is allergic to bactrim [sulfamethoxazole-trimethoprim]. Physical Exam     INITIAL VITALS: BP: 124/75, Temp: 98.5 °F (36.9 °C), Pulse: 60, Resp: 14, SpO2: 97 %  Physical Exam   Constitutional: He is oriented to person, place, and time. He appears well-developed and well-nourished. No distress. HENT:   Head: Normocephalic and atraumatic. Eyes: Pupils are equal, round, and reactive to light. EOM are normal.   Neck: Normal range of motion. No JVD present. Cardiovascular: Normal rate. Pulmonary/Chest: Effort normal and breath sounds normal. No stridor. Abdominal: There is no tenderness. Genitourinary: Rectal exam shows no external hemorrhoid, no internal hemorrhoid, no fissure, no tenderness, anal tone normal and guaiac negative stool.    Musculoskeletal: He exhibits no edema or tenderness. Neurological: He is alert and oriented to person, place, and time. Skin: Skin is warm and dry. He is not diaphoretic. The patient has a large area of erythema along the buttocks and coccyx region radiating to the perineum concerning for a grade 1 decubitus ulcer. There is no epidermal insult or drainage noted. No areas of induration or fluctuance. Psychiatric: He has a normal mood and affect. Nursing note and vitals reviewed. Diagnostic Results     RADIOLOGY:  No orders to display       LABS:   Results for orders placed or performed during the hospital encounter of 08/27/19   Urine Culture   Result Value Ref Range    Urine Culture, Routine       >50,000 CFU/ml Mixed pathogens  Multiple organisms isolated, no predominance. Culture  indicates probable contamination. Please review colony count  and clinical indications to determine if a repeat culture is  necessary. No further workup to be done.      CBC auto differential   Result Value Ref Range    WBC 5.9 4.0 - 11.0 K/uL    RBC 3.93 (L) 4.20 - 5.90 M/uL    Hemoglobin 12.8 (L) 13.5 - 17.5 g/dL    Hematocrit 38.9 (L) 40.5 - 52.5 %    MCV 98.8 80.0 - 100.0 fL    MCH 32.6 26.0 - 34.0 pg    MCHC 33.0 31.0 - 36.0 g/dL    RDW 15.0 12.4 - 15.4 %    Platelets 219 742 - 662 K/uL    MPV 9.6 5.0 - 10.5 fL    Neutrophils % 62.0 %    Lymphocytes % 17.6 %    Monocytes % 9.3 %    Eosinophils % 9.8 %    Basophils % 1.3 %    Neutrophils Absolute 3.6 1.7 - 7.7 K/uL    Lymphocytes Absolute 1.0 1.0 - 5.1 K/uL    Monocytes Absolute 0.5 0.0 - 1.3 K/uL    Eosinophils Absolute 0.6 0.0 - 0.6 K/uL    Basophils Absolute 0.1 0.0 - 0.2 K/uL   Basic Metabolic Panel w/ Reflex to MG   Result Value Ref Range    Sodium 141 136 - 145 mmol/L    Potassium reflex Magnesium 4.7 3.5 - 5.1 mmol/L    Chloride 105 99 - 110 mmol/L    CO2 27 21 - 32 mmol/L    Anion Gap 9 3 - 16    Glucose 119 (H) 70 - 99 mg/dL    BUN 26 (H) 7 - 20 mg/dL    CREATININE 0.9 0.8 - 1.3 mg/dL    GFR Non-African American >60 >60    GFR African American >60 >60    Calcium 8.7 8.3 - 10.6 mg/dL   Urinalysis Reflex to Culture   Result Value Ref Range    Color, UA Yellow Straw/Yellow    Clarity, UA CLOUDY (A) Clear    Glucose, Ur Color Interfer (A) Negative mg/dL    Bilirubin Urine Color Interfer (A) Negative    Ketones, Urine Color Interfer (A) Negative mg/dL    Specific Gravity, UA >=1.030 1.005 - 1.030    Blood, Urine Color Interfer (A) Negative    pH, UA Color Interfer (A) 5.0 - 8.0    Protein, UA Color Interfer (A) Negative mg/dL    Urobilinogen, Urine Color Interfer (A) <2.0 E.U./dL    Nitrite, Urine POSITIVE (A) Negative    Leukocyte Esterase, Urine Color Interfer (A) Negative    Microscopic Examination YES     Urine Reflex to Culture Yes     Urine Type Voided    Microscopic Urinalysis   Result Value Ref Range    WBC, UA >100 (A) 0 - 5 /HPF    RBC, UA see below 0 - 2 /HPF    Bacteria, UA 4+ (A) /HPF       ED BEDSIDE ULTRASOUND:  N/A    RECENT VITALS:  BP: (!) 141/60, Temp: 98.5 °F (36.9 °C), Pulse: 60, Resp: 14, SpO2: 100 %     Procedures     N/A    ED Course     Nursing Notes, Past Medical Hx,Past Surgical Hx, Social Hx, Allergies, and Family Hx were reviewed. The patient was given the following medications:  Orders Placed This Encounter   Medications    cefTRIAXone (ROCEPHIN) 1 g IVPB in 50 mL D5W minibag    ciprofloxacin (CIPRO) 500 MG tablet     Sig: Take 1 tablet by mouth 2 times daily for 7 days     Dispense:  14 tablet     Refill:  0    Misc. Devices (CAREX COCCYX CUSHION) MISC     Sig: Please dispense 1 coccyx cushion. Use when sitting in wheelchair for extended periods of time. Dispense:  1 each     Refill:  0       CONSULTS:  None    MEDICAL DECISION MAKING / ASSESSMENT / PLAN     vEan Skaggs is admitted to the Emergency Department for evaluation of his chief complaint as described in the history of present illness.   Complete history and physical was performed by me and my patient who verbalizes understanding and is in agreement. The patient is currently stable and will be discharged home for continued self-care. Please see patient's AVS for additional discharge instructions. The patient was seen and evaluated by myself and the attending physician, Khloe Krishnamurthy MD,  who agrees with my assessment, treatment and plan. Clinical Impression     1. Urinary tract infection associated with indwelling urethral catheter, initial encounter ([facility] [address]    2. Pressure injury of contiguous region involving back and buttock, stage 1, unspecified laterality        Disposition     PATIENT REFERRED TO:  Adina Tran    Schedule an appointment as soon as possible for a visit       [facility] Emergency Department  [address] [place] [address]  [phone]  Go to   If symptoms worsen      DISCHARGE MEDICATIONS:  Discharge Medication List as of 8/28/2019  1:29 AM      START taking these medications    Details   ciprofloxacin (CIPRO) 500 MG tablet Take 1 tablet by mouth 2 times daily for 7 days, Disp-14 tablet, R-0Print      Misc. Devices (CAREX COCCYX CUSHION) MISC Disp-1 each, R-0, PrintPlease dispense 1 coccyx cushion. Use when sitting in wheelchair for extended periods of time.              DISPOSITION Decision To Discharge 08/28/2019 01:35:04 AM       [address] [place] [address]  09/04/19 [address] [place], [place]  09/04/19 [id number] stopped [id number]

## 2019-09-13 ENCOUNTER — HOSPITAL ENCOUNTER (EMERGENCY)
Age: 79
Discharge: HOME | End: 2019-09-14
Attending: EMERGENCY MEDICINE
Payer: MEDICARE

## 2019-09-13 ENCOUNTER — APPOINTMENT (OUTPATIENT)
Dept: CT IMAGING | Age: 79
End: 2019-09-13
Payer: MEDICARE

## 2019-09-13 DIAGNOSIS — K59.00 CONSTIPATION, UNSPECIFIED CONSTIPATION TYPE: Primary | ICD-10-CM

## 2019-09-13 LAB
ANION GAP SERPL CALCULATED.3IONS-SCNC: 10 MMOL/L (ref 3–16)
BASOPHILS ABSOLUTE: 0.1 K/UL (ref 0–0.2)
BASOPHILS RELATIVE PERCENT: 1.2 %
BUN BLDV-MCNC: 29 MG/DL (ref 7–20)
CALCIUM SERPL-MCNC: 8.7 MG/DL (ref 8.3–10.6)
CHLORIDE BLD-SCNC: 105 MMOL/L (ref 99–110)
CO2: 23 MMOL/L (ref 21–32)
CREAT SERPL-MCNC: 0.9 MG/DL (ref 0.8–1.3)
EOSINOPHILS ABSOLUTE: 0.3 K/UL (ref 0–0.6)
EOSINOPHILS RELATIVE PERCENT: 5.2 %
GFR AFRICAN AMERICAN: >60
GFR NON-AFRICAN AMERICAN: >60
GLUCOSE BLD-MCNC: 90 MG/DL (ref 70–99)
HCT VFR BLD CALC: 37.3 % (ref 40.5–52.5)
HEMOGLOBIN: 12.2 G/DL (ref 13.5–17.5)
LYMPHOCYTES ABSOLUTE: 1.2 K/UL (ref 1–5.1)
LYMPHOCYTES RELATIVE PERCENT: 18.7 %
MCH RBC QN AUTO: 32.6 PG (ref 26–34)
MCHC RBC AUTO-ENTMCNC: 32.8 G/DL (ref 31–36)
MCV RBC AUTO: 99.6 FL (ref 80–100)
MONOCYTES ABSOLUTE: 0.6 K/UL (ref 0–1.3)
MONOCYTES RELATIVE PERCENT: 9 %
NEUTROPHILS ABSOLUTE: 4.2 K/UL (ref 1.7–7.7)
NEUTROPHILS RELATIVE PERCENT: 65.9 %
PDW BLD-RTO: 15.3 % (ref 12.4–15.4)
PLATELET # BLD: 170 K/UL (ref 135–450)
PMV BLD AUTO: 10.3 FL (ref 5–10.5)
POTASSIUM REFLEX MAGNESIUM: 5 MMOL/L (ref 3.5–5.1)
RBC # BLD: 3.74 M/UL (ref 4.2–5.9)
SODIUM BLD-SCNC: 138 MMOL/L (ref 136–145)
WBC # BLD: 6.4 K/UL (ref 4–11)

## 2019-09-13 PROCEDURE — 6370000000 HC RX 637 (ALT 250 FOR IP): Performed by: STUDENT IN AN ORGANIZED HEALTH CARE EDUCATION/TRAINING PROGRAM

## 2019-09-13 PROCEDURE — 6360000004 HC RX CONTRAST MEDICATION: Performed by: EMERGENCY MEDICINE

## 2019-09-13 PROCEDURE — 80048 BASIC METABOLIC PNL TOTAL CA: CPT

## 2019-09-13 PROCEDURE — 6370000000 HC RX 637 (ALT 250 FOR IP): Performed by: EMERGENCY MEDICINE

## 2019-09-13 PROCEDURE — 74177 CT ABD & PELVIS W/CONTRAST: CPT

## 2019-09-13 PROCEDURE — 99284 EMERGENCY DEPT VISIT MOD MDM: CPT

## 2019-09-13 PROCEDURE — 85025 COMPLETE CBC W/AUTO DIFF WBC: CPT

## 2019-09-13 PROCEDURE — 36415 COLL VENOUS BLD VENIPUNCTURE: CPT

## 2019-09-13 PROCEDURE — 6360000002 HC RX W HCPCS: Performed by: STUDENT IN AN ORGANIZED HEALTH CARE EDUCATION/TRAINING PROGRAM

## 2019-09-13 PROCEDURE — 96374 THER/PROPH/DIAG INJ IV PUSH: CPT

## 2019-09-13 RX ORDER — SENNOSIDES 8.8 MG/5ML
5 LIQUID ORAL ONCE
Status: COMPLETED | OUTPATIENT
Start: 2019-09-13 | End: 2019-09-13

## 2019-09-13 RX ORDER — LACTULOSE 10 G/15ML
20 SOLUTION ORAL ONCE
Status: COMPLETED | OUTPATIENT
Start: 2019-09-13 | End: 2019-09-13

## 2019-09-13 RX ORDER — METOCLOPRAMIDE HYDROCHLORIDE 5 MG/ML
10 INJECTION INTRAMUSCULAR; INTRAVENOUS ONCE
Status: COMPLETED | OUTPATIENT
Start: 2019-09-13 | End: 2019-09-13

## 2019-09-13 RX ADMIN — MINERAL OIL 330 ML: 1000 SOLUTION ORAL at 19:20

## 2019-09-13 RX ADMIN — METOCLOPRAMIDE 10 MG: 5 INJECTION, SOLUTION INTRAMUSCULAR; INTRAVENOUS at 21:27

## 2019-09-13 RX ADMIN — IOPAMIDOL 80 ML: 755 INJECTION, SOLUTION INTRAVENOUS at 16:15

## 2019-09-13 RX ADMIN — LACTULOSE 20 G: 20 SOLUTION ORAL at 21:59

## 2019-09-13 RX ADMIN — Medication 8.8 MG: at 21:59

## 2019-09-13 ASSESSMENT — ENCOUNTER SYMPTOMS
FACIAL SWELLING: 0
RHINORRHEA: 0
CHOKING: 0
CHEST TIGHTNESS: 0
SHORTNESS OF BREATH: 0
EYE PAIN: 0
VOMITING: 0
EYE DISCHARGE: 0
DIARRHEA: 0
ABDOMINAL PAIN: 0
CONSTIPATION: 1
NAUSEA: 0
BLOOD IN STOOL: 1

## 2019-09-14 VITALS
DIASTOLIC BLOOD PRESSURE: 57 MMHG | TEMPERATURE: 97.6 F | OXYGEN SATURATION: 96 % | HEIGHT: 71 IN | BODY MASS INDEX: 31.5 KG/M2 | SYSTOLIC BLOOD PRESSURE: 137 MMHG | WEIGHT: 225 LBS | RESPIRATION RATE: 20 BRPM | HEART RATE: 52 BPM

## 2019-09-14 RX ORDER — POLYETHYLENE GLYCOL 3350 17 G/17G
17 POWDER, FOR SOLUTION ORAL DAILY
Qty: 1 BOTTLE | Refills: 0 | Status: SHIPPED | OUTPATIENT
Start: 2019-09-14 | End: 2019-09-21

## 2019-09-14 NOTE — ED NOTES
Patient discharged in stable condition. Instructions and prescriptions reviewed. Given opportunity to ask questions if needed and patient verbalized understanding. All questions answered. [facility] transporting patient back to home. Belongings and paperwork with patient.         Jaedn Espinoza RN  09/14/19 [id number]

## 2019-09-14 NOTE — CONSULTS
Resident Consult Note  General Surgery     Reason for Consult: Fecal impaction     Chief Complaint: Constipation     History of Present Illness:   Urvashi Montenegro is a 67 y.o. male with past medical history of HTN, HLD, GERD, CAD s/p CABG in 2013, AFib, and neuromuscular disorder who presents with constipation. He reports that his normal frequency of bowel movements is about once every five days; he states that his last BM was five days ago. He typically takes Senna-S at home. CT performed in the ED reveals large stool burden with distention of the rectum. Attempt at manual disimpaction was unsuccessful. SMOG enema was also tried, of which the patient retained about 2/3rds with no response at the time of consultation visit. Patient states that he will not attempt to sit on a bedside commode to stimulate a BM and chooses rather to have additional manual disimpaction attempted. Past Medical History:        Diagnosis Date    BPH (benign prostatic hyperplasia)     Carotid stenosis 12/2013    JESU 20-30% stenosis; LICA 01-96% stenosis    Cellulitis 12/2013    LLE    Chronic back pain     Diverticular disease     Erectile dysfunction     GERD (gastroesophageal reflux disease)     History of atrial fibrillation     Hypercholesteremia     Hypertension     Lower GI bleed     Neuromuscular disorder ([facility] [address]     spasticity    Renal insufficiency     Risk for falls     uses walker    Tinea corporis 12/2013    LLE    Vitamin B12 deficiency      Past Surgical History:        Procedure Laterality Date    BACK SURGERY  2006    lower lumbar    CORONARY ARTERY BYPASS GRAFT  12/13/2013    CABG x 5 (Dr Saba Pina)   [facility] N/A 1/10/2019    CYSTOSCOPY performed by Allie Duckworth MD at [facility] [address] 5/1/2015    [place]     Allergies:  Bactrim [sulfamethoxazole-trimethoprim]    Medications:   Home Meds  No current facility-administered medications on file prior to encounter. Current Outpatient Medications on File Prior to Encounter   Medication Sig Dispense Refill    Misc. Devices (CAREX COCCYX CUSHION) MISC Please dispense 1 coccyx cushion. Use when sitting in wheelchair for extended periods of time. 1 each 0    spironolactone (ALDACTONE) 25 MG tablet Take 0.5 tablets by mouth daily 30 tablet 3    tamsulosin (FLOMAX) 0.4 MG capsule Take 2 capsules by mouth daily 60 capsule 0    isosorbide mononitrate (IMDUR) 30 MG extended release tablet Take 1 tablet by mouth nightly 90 tablet 0    gabapentin (NEURONTIN) 600 MG tablet Take 1,200 mg by mouth Daily with lunch. Christina Allan gabapentin (NEURONTIN) 600 MG tablet Take 1,800 mg by mouth nightly. Christina Allan pantoprazole sodium (PROTONIX) 40 MG PACK packet Take 40 mg by mouth every morning (before breakfast)      Melatonin 10 MG TABS Take 10 mg by mouth nightly      ferrous sulfate 325 (65 Fe) MG tablet Take 325 mg by mouth daily (with breakfast)      sennosides-docusate sodium (SENOKOT-S) 8.6-50 MG tablet Take 1 tablet by mouth daily as needed for Constipation      furosemide (LASIX) 20 MG tablet Take 20 mg by mouth daily      NIACIN PO Take 1 tablet by mouth daily      Multiple Vitamins-Minerals (THERAPEUTIC MULTIVITAMIN-MINERALS) tablet Take 1 tablet by mouth daily      aspirin 325 MG tablet Take 325 mg by mouth daily      baclofen (GABLOFEN) 21375 MCG/20ML SOSY injection 40,000 mcg by Intrathecal route infusing      ondansetron (ZOFRAN ODT) 4 MG disintegrating tablet Take 1 tablet by mouth every 8 hours as needed for Nausea 20 tablet 0    bismuth subsalicylate (PEPTO BISMOL) 262 MG/15ML suspension Take 15 mLs by mouth every 6 hours as needed for Indigestion      metoprolol (TOPROL-XL) 25 MG XL tablet Take 0.5 tablets by mouth daily Hold for HR<60, SBP<100 30 tablet 3    gabapentin (NEURONTIN) 600 MG tablet Take 600 mg by mouth daily. Christina Allan atorvastatin (LIPITOR) 80 MG tablet Take 80 mg by mouth nightly.       zolpidem Component Value Date    AST 23 11/06/2018    ALT 15 11/06/2018    BILIDIR <0.2 11/06/2018    BILITOT 0.7 11/06/2018    ALKPHOS 126 11/06/2018    GGT 23 05/01/2015     Lab Results   Component Value Date    CHOL 144 12/03/2013    HDL 31 12/03/2013    TRIG 85 12/03/2013     UA: Lab Results   Component Value Date    COLORU Yellow 08/27/2019    PHUR Color Interfer 08/27/2019    LABCAST 0-1 Hyaline 12/16/2013    WBCUA >100 08/27/2019    RBCUA see below 08/27/2019    MUCUS 3+ 01/03/2019    BACTERIA 4+ 08/27/2019    CLARITYU CLOUDY 08/27/2019    SPECGRAV >=1.030 08/27/2019    LEUKOCYTESUR Color Interfer 08/27/2019    UROBILINOGEN Color Interfer 08/27/2019    BILIRUBINUR Color Interfer 08/27/2019    BLOODU Color Interfer 08/27/2019    GLUCOSEU Color Interfer 08/27/2019    AMORPHOUS 2+ 07/27/2017     Imaging:   CT ABDOMEN PELVIS W IV CONTRAST Additional Contrast? None   Final Result      Left lower lobe consolidation and volume loss. Stool throughout the colon, with a large amount in the rectum, which is distended. Findings are consistent with impaction. No evidence of obstruction. Incidental cholelithiasis. Nonobstructive left-sided nephrolithiasis. Benign right renal cortical cyst.      No other acute findings in the abdomen or pelvis. Assessment/Plan:   This is a 67 y.o. male with past medical history of HTN, HLD, GERD, CAD s/p CABG in 2013, AFib, and neuromuscular disorder who presents with constipation.     - SMOG enema attempted without good response  - Performed additional manual disimpaction at bedside in the ED with retrieval of multiple hard stool balls, additional non-formed stool remains though unable to remove manually due to prohibitive consistency    - Will provide with 1 dose of lactulose, 1 dose of liquid Senna, and 10mg IV Reglan and await response     Sary Nice MD, MPH  PGY-1 General Surgery  09/13/19  8:23 PM  [phone]

## 2019-10-06 ENCOUNTER — HOSPITAL ENCOUNTER (INPATIENT)
Age: 79
LOS: 1 days | Discharge: HOME HEALTH | DRG: 698 | End: 2019-10-09
Attending: EMERGENCY MEDICINE | Admitting: INTERNAL MEDICINE
Payer: MEDICARE

## 2019-10-06 DIAGNOSIS — R19.7 DIARRHEA, UNSPECIFIED TYPE: Primary | ICD-10-CM

## 2019-10-06 DIAGNOSIS — R77.8 ELEVATED TROPONIN: ICD-10-CM

## 2019-10-06 LAB
ALBUMIN SERPL-MCNC: 3.8 G/DL (ref 3.4–5)
ALP BLD-CCNC: 183 U/L (ref 40–129)
ALT SERPL-CCNC: 14 U/L (ref 10–40)
ANION GAP SERPL CALCULATED.3IONS-SCNC: 9 MMOL/L (ref 3–16)
AST SERPL-CCNC: 17 U/L (ref 15–37)
BACTERIA: ABNORMAL /HPF
BILIRUB SERPL-MCNC: 0.4 MG/DL (ref 0–1)
BILIRUBIN DIRECT: <0.2 MG/DL (ref 0–0.3)
BILIRUBIN URINE: NEGATIVE
BILIRUBIN, INDIRECT: ABNORMAL MG/DL (ref 0–1)
BLOOD, URINE: ABNORMAL
BUN BLDV-MCNC: 25 MG/DL (ref 7–20)
CALCIUM SERPL-MCNC: 9.5 MG/DL (ref 8.3–10.6)
CHLORIDE BLD-SCNC: 106 MMOL/L (ref 99–110)
CLARITY: CLEAR
CO2: 25 MMOL/L (ref 21–32)
COLOR: YELLOW
CREAT SERPL-MCNC: 1 MG/DL (ref 0.8–1.3)
EPITHELIAL CELLS, UA: ABNORMAL /HPF
GFR AFRICAN AMERICAN: >60
GFR NON-AFRICAN AMERICAN: >60
GLUCOSE BLD-MCNC: 145 MG/DL (ref 70–99)
GLUCOSE URINE: NEGATIVE MG/DL
HCT VFR BLD CALC: 46.1 % (ref 40.5–52.5)
HEMOGLOBIN: 15.2 G/DL (ref 13.5–17.5)
KETONES, URINE: NEGATIVE MG/DL
LEUKOCYTE ESTERASE, URINE: ABNORMAL
LIPASE: 16 U/L (ref 13–60)
MCH RBC QN AUTO: 32.4 PG (ref 26–34)
MCHC RBC AUTO-ENTMCNC: 32.9 G/DL (ref 31–36)
MCV RBC AUTO: 98.4 FL (ref 80–100)
MICROSCOPIC EXAMINATION: YES
NITRITE, URINE: POSITIVE
PDW BLD-RTO: 14.7 % (ref 12.4–15.4)
PH UA: 7.5 (ref 5–8)
PLATELET # BLD: 133 K/UL (ref 135–450)
PMV BLD AUTO: 10.1 FL (ref 5–10.5)
POTASSIUM REFLEX MAGNESIUM: 5.5 MMOL/L (ref 3.5–5.1)
PROTEIN UA: 100 MG/DL
RBC # BLD: 4.69 M/UL (ref 4.2–5.9)
RBC UA: ABNORMAL /HPF (ref 0–2)
SODIUM BLD-SCNC: 140 MMOL/L (ref 136–145)
SPECIFIC GRAVITY UA: 1.02 (ref 1–1.03)
TOTAL PROTEIN: 8.4 G/DL (ref 6.4–8.2)
URINE TYPE: ABNORMAL
UROBILINOGEN, URINE: 0.2 E.U./DL
WBC # BLD: 8.2 K/UL (ref 4–11)
WBC UA: ABNORMAL /HPF (ref 0–5)

## 2019-10-06 PROCEDURE — 81001 URINALYSIS AUTO W/SCOPE: CPT

## 2019-10-06 PROCEDURE — 36415 COLL VENOUS BLD VENIPUNCTURE: CPT

## 2019-10-06 PROCEDURE — 83690 ASSAY OF LIPASE: CPT

## 2019-10-06 PROCEDURE — 87086 URINE CULTURE/COLONY COUNT: CPT

## 2019-10-06 PROCEDURE — 83880 ASSAY OF NATRIURETIC PEPTIDE: CPT

## 2019-10-06 PROCEDURE — 84484 ASSAY OF TROPONIN QUANT: CPT

## 2019-10-06 PROCEDURE — 80076 HEPATIC FUNCTION PANEL: CPT

## 2019-10-06 PROCEDURE — 87077 CULTURE AEROBIC IDENTIFY: CPT

## 2019-10-06 PROCEDURE — 87186 SC STD MICRODIL/AGAR DIL: CPT

## 2019-10-06 PROCEDURE — 99285 EMERGENCY DEPT VISIT HI MDM: CPT

## 2019-10-06 PROCEDURE — 85027 COMPLETE CBC AUTOMATED: CPT

## 2019-10-06 PROCEDURE — 80048 BASIC METABOLIC PNL TOTAL CA: CPT

## 2019-10-07 PROBLEM — T83.9XXA PROBLEM WITH URINARY CATHETER (HCC): Status: ACTIVE | Noted: 2019-10-07

## 2019-10-07 LAB
ANION GAP SERPL CALCULATED.3IONS-SCNC: 11 MMOL/L (ref 3–16)
BUN BLDV-MCNC: 23 MG/DL (ref 7–20)
CALCIUM SERPL-MCNC: 9.1 MG/DL (ref 8.3–10.6)
CHLORIDE BLD-SCNC: 106 MMOL/L (ref 99–110)
CO2: 26 MMOL/L (ref 21–32)
CREAT SERPL-MCNC: 1 MG/DL (ref 0.8–1.3)
EKG ATRIAL RATE: 66 BPM
EKG DIAGNOSIS: NORMAL
EKG P AXIS: 24 DEGREES
EKG P-R INTERVAL: 180 MS
EKG Q-T INTERVAL: 402 MS
EKG QRS DURATION: 82 MS
EKG QTC CALCULATION (BAZETT): 421 MS
EKG R AXIS: 7 DEGREES
EKG T AXIS: 40 DEGREES
EKG VENTRICULAR RATE: 66 BPM
GFR AFRICAN AMERICAN: >60
GFR NON-AFRICAN AMERICAN: >60
GLUCOSE BLD-MCNC: 91 MG/DL (ref 70–99)
LACTIC ACID: 2.2 MMOL/L (ref 0.4–2)
POTASSIUM REFLEX MAGNESIUM: 4.4 MMOL/L (ref 3.5–5.1)
PRO-BNP: 571 PG/ML (ref 0–449)
SODIUM BLD-SCNC: 143 MMOL/L (ref 136–145)
TROPONIN: 0.04 NG/ML
TROPONIN: 0.05 NG/ML

## 2019-10-07 PROCEDURE — 2580000003 HC RX 258: Performed by: INTERNAL MEDICINE

## 2019-10-07 PROCEDURE — 97165 OT EVAL LOW COMPLEX 30 MIN: CPT

## 2019-10-07 PROCEDURE — 6360000002 HC RX W HCPCS: Performed by: INTERNAL MEDICINE

## 2019-10-07 PROCEDURE — 97530 THERAPEUTIC ACTIVITIES: CPT

## 2019-10-07 PROCEDURE — 80048 BASIC METABOLIC PNL TOTAL CA: CPT

## 2019-10-07 PROCEDURE — G0378 HOSPITAL OBSERVATION PER HR: HCPCS

## 2019-10-07 PROCEDURE — 6370000000 HC RX 637 (ALT 250 FOR IP): Performed by: INTERNAL MEDICINE

## 2019-10-07 PROCEDURE — 84484 ASSAY OF TROPONIN QUANT: CPT

## 2019-10-07 PROCEDURE — 96361 HYDRATE IV INFUSION ADD-ON: CPT

## 2019-10-07 PROCEDURE — 96372 THER/PROPH/DIAG INJ SC/IM: CPT

## 2019-10-07 PROCEDURE — 87040 BLOOD CULTURE FOR BACTERIA: CPT

## 2019-10-07 PROCEDURE — 83605 ASSAY OF LACTIC ACID: CPT

## 2019-10-07 PROCEDURE — 97161 PT EVAL LOW COMPLEX 20 MIN: CPT

## 2019-10-07 PROCEDURE — 36415 COLL VENOUS BLD VENIPUNCTURE: CPT

## 2019-10-07 PROCEDURE — 96365 THER/PROPH/DIAG IV INF INIT: CPT

## 2019-10-07 PROCEDURE — 93005 ELECTROCARDIOGRAM TRACING: CPT | Performed by: STUDENT IN AN ORGANIZED HEALTH CARE EDUCATION/TRAINING PROGRAM

## 2019-10-07 RX ORDER — SODIUM CHLORIDE 0.9 % (FLUSH) 0.9 %
10 SYRINGE (ML) INJECTION PRN
Status: DISCONTINUED | OUTPATIENT
Start: 2019-10-07 | End: 2019-10-09 | Stop reason: HOSPADM

## 2019-10-07 RX ORDER — UREA 10 %
10 LOTION (ML) TOPICAL NIGHTLY
Status: DISCONTINUED | OUTPATIENT
Start: 2019-10-07 | End: 2019-10-09 | Stop reason: HOSPADM

## 2019-10-07 RX ORDER — ATORVASTATIN CALCIUM 80 MG/1
80 TABLET, FILM COATED ORAL NIGHTLY
Status: DISCONTINUED | OUTPATIENT
Start: 2019-10-07 | End: 2019-10-09 | Stop reason: HOSPADM

## 2019-10-07 RX ORDER — SODIUM CHLORIDE 0.9 % (FLUSH) 0.9 %
10 SYRINGE (ML) INJECTION EVERY 12 HOURS SCHEDULED
Status: DISCONTINUED | OUTPATIENT
Start: 2019-10-07 | End: 2019-10-09 | Stop reason: HOSPADM

## 2019-10-07 RX ORDER — GABAPENTIN 600 MG/1
1200 TABLET ORAL
Status: DISCONTINUED | OUTPATIENT
Start: 2019-10-07 | End: 2019-10-09 | Stop reason: HOSPADM

## 2019-10-07 RX ORDER — SODIUM CHLORIDE 9 MG/ML
INJECTION, SOLUTION INTRAVENOUS CONTINUOUS
Status: ACTIVE | OUTPATIENT
Start: 2019-10-07 | End: 2019-10-07

## 2019-10-07 RX ORDER — GABAPENTIN 600 MG/1
1800 TABLET ORAL NIGHTLY
Status: DISCONTINUED | OUTPATIENT
Start: 2019-10-07 | End: 2019-10-09 | Stop reason: HOSPADM

## 2019-10-07 RX ORDER — ASPIRIN 325 MG
325 TABLET ORAL DAILY
Status: DISCONTINUED | OUTPATIENT
Start: 2019-10-07 | End: 2019-10-09 | Stop reason: HOSPADM

## 2019-10-07 RX ORDER — ISOSORBIDE MONONITRATE 30 MG/1
30 TABLET, EXTENDED RELEASE ORAL NIGHTLY
Status: DISCONTINUED | OUTPATIENT
Start: 2019-10-07 | End: 2019-10-09 | Stop reason: HOSPADM

## 2019-10-07 RX ORDER — PANTOPRAZOLE SODIUM 40 MG/1
40 TABLET, DELAYED RELEASE ORAL
Status: DISCONTINUED | OUTPATIENT
Start: 2019-10-07 | End: 2019-10-09 | Stop reason: HOSPADM

## 2019-10-07 RX ORDER — PANTOPRAZOLE SODIUM 40 MG/1
40 GRANULE, DELAYED RELEASE ORAL
Status: DISCONTINUED | OUTPATIENT
Start: 2019-10-07 | End: 2019-10-07 | Stop reason: SDUPTHER

## 2019-10-07 RX ORDER — ACETAMINOPHEN 325 MG/1
650 TABLET ORAL EVERY 4 HOURS PRN
Status: DISCONTINUED | OUTPATIENT
Start: 2019-10-07 | End: 2019-10-09 | Stop reason: HOSPADM

## 2019-10-07 RX ORDER — METOPROLOL SUCCINATE 25 MG/1
12.5 TABLET, EXTENDED RELEASE ORAL DAILY
Status: DISCONTINUED | OUTPATIENT
Start: 2019-10-07 | End: 2019-10-09 | Stop reason: HOSPADM

## 2019-10-07 RX ORDER — GABAPENTIN 600 MG/1
600 TABLET ORAL DAILY
Status: DISCONTINUED | OUTPATIENT
Start: 2019-10-07 | End: 2019-10-09 | Stop reason: HOSPADM

## 2019-10-07 RX ORDER — FUROSEMIDE 20 MG/1
20 TABLET ORAL DAILY
Status: DISCONTINUED | OUTPATIENT
Start: 2019-10-07 | End: 2019-10-08

## 2019-10-07 RX ORDER — SODIUM CHLORIDE, SODIUM LACTATE, POTASSIUM CHLORIDE, AND CALCIUM CHLORIDE .6; .31; .03; .02 G/100ML; G/100ML; G/100ML; G/100ML
1000 INJECTION, SOLUTION INTRAVENOUS ONCE
Status: COMPLETED | OUTPATIENT
Start: 2019-10-07 | End: 2019-10-07

## 2019-10-07 RX ORDER — ONDANSETRON 2 MG/ML
4 INJECTION INTRAMUSCULAR; INTRAVENOUS EVERY 6 HOURS PRN
Status: DISCONTINUED | OUTPATIENT
Start: 2019-10-07 | End: 2019-10-09 | Stop reason: HOSPADM

## 2019-10-07 RX ORDER — TAMSULOSIN HYDROCHLORIDE 0.4 MG/1
0.8 CAPSULE ORAL DAILY
Status: DISCONTINUED | OUTPATIENT
Start: 2019-10-07 | End: 2019-10-09 | Stop reason: HOSPADM

## 2019-10-07 RX ORDER — SODIUM CHLORIDE 9 MG/ML
INJECTION, SOLUTION INTRAVENOUS CONTINUOUS
Status: DISCONTINUED | OUTPATIENT
Start: 2019-10-07 | End: 2019-10-09 | Stop reason: HOSPADM

## 2019-10-07 RX ADMIN — FUROSEMIDE 20 MG: 20 TABLET ORAL at 12:09

## 2019-10-07 RX ADMIN — GABAPENTIN 1200 MG: 600 TABLET, FILM COATED ORAL at 12:09

## 2019-10-07 RX ADMIN — ATORVASTATIN CALCIUM 80 MG: 80 TABLET, FILM COATED ORAL at 20:43

## 2019-10-07 RX ADMIN — CEFTRIAXONE SODIUM 2 G: 2 INJECTION, POWDER, FOR SOLUTION INTRAMUSCULAR; INTRAVENOUS at 12:08

## 2019-10-07 RX ADMIN — GABAPENTIN 600 MG: 600 TABLET, FILM COATED ORAL at 09:03

## 2019-10-07 RX ADMIN — ENOXAPARIN SODIUM 40 MG: 40 INJECTION SUBCUTANEOUS at 09:03

## 2019-10-07 RX ADMIN — ASPIRIN 325 MG ORAL TABLET 325 MG: 325 PILL ORAL at 09:03

## 2019-10-07 RX ADMIN — TAMSULOSIN HYDROCHLORIDE 0.8 MG: 0.4 CAPSULE ORAL at 09:03

## 2019-10-07 RX ADMIN — METOPROLOL SUCCINATE 12.5 MG: 25 TABLET, EXTENDED RELEASE ORAL at 12:09

## 2019-10-07 RX ADMIN — ISOSORBIDE MONONITRATE 30 MG: 30 TABLET, EXTENDED RELEASE ORAL at 20:43

## 2019-10-07 RX ADMIN — SODIUM CHLORIDE: 9 INJECTION, SOLUTION INTRAVENOUS at 20:43

## 2019-10-07 RX ADMIN — SODIUM CHLORIDE: 9 INJECTION, SOLUTION INTRAVENOUS at 04:16

## 2019-10-07 RX ADMIN — SODIUM CHLORIDE, POTASSIUM CHLORIDE, SODIUM LACTATE AND CALCIUM CHLORIDE 1000 ML: 600; 310; 30; 20 INJECTION, SOLUTION INTRAVENOUS at 10:40

## 2019-10-07 RX ADMIN — Medication 10 MG: at 20:43

## 2019-10-07 RX ADMIN — GABAPENTIN 1800 MG: 600 TABLET, FILM COATED ORAL at 20:43

## 2019-10-07 RX ADMIN — PANTOPRAZOLE SODIUM 40 MG: 40 TABLET, DELAYED RELEASE ORAL at 05:52

## 2019-10-07 ASSESSMENT — ENCOUNTER SYMPTOMS
ABDOMINAL PAIN: 0
ABDOMINAL DISTENTION: 1
BACK PAIN: 0
BLOOD IN STOOL: 0
CONSTIPATION: 0
NAUSEA: 0
WHEEZING: 0
RHINORRHEA: 0
DIARRHEA: 1
SHORTNESS OF BREATH: 0
VOMITING: 0

## 2019-10-07 ASSESSMENT — PAIN SCALES - GENERAL
PAINLEVEL_OUTOF10: 0
PAINLEVEL_OUTOF10: 0

## 2019-10-08 PROBLEM — G93.40 ACUTE ENCEPHALOPATHY: Status: ACTIVE | Noted: 2019-10-08

## 2019-10-08 PROCEDURE — 6360000002 HC RX W HCPCS: Performed by: INTERNAL MEDICINE

## 2019-10-08 PROCEDURE — 1200000000 HC SEMI PRIVATE

## 2019-10-08 PROCEDURE — 2580000003 HC RX 258: Performed by: INTERNAL MEDICINE

## 2019-10-08 PROCEDURE — 96361 HYDRATE IV INFUSION ADD-ON: CPT

## 2019-10-08 PROCEDURE — 6370000000 HC RX 637 (ALT 250 FOR IP): Performed by: NURSE PRACTITIONER

## 2019-10-08 PROCEDURE — 97530 THERAPEUTIC ACTIVITIES: CPT

## 2019-10-08 PROCEDURE — 96366 THER/PROPH/DIAG IV INF ADDON: CPT

## 2019-10-08 PROCEDURE — 96372 THER/PROPH/DIAG INJ SC/IM: CPT

## 2019-10-08 PROCEDURE — 6370000000 HC RX 637 (ALT 250 FOR IP): Performed by: INTERNAL MEDICINE

## 2019-10-08 RX ORDER — OXYBUTYNIN CHLORIDE 5 MG/1
5 TABLET ORAL 3 TIMES DAILY
Status: DISCONTINUED | OUTPATIENT
Start: 2019-10-08 | End: 2019-10-09 | Stop reason: HOSPADM

## 2019-10-08 RX ADMIN — ISOSORBIDE MONONITRATE 30 MG: 30 TABLET, EXTENDED RELEASE ORAL at 20:52

## 2019-10-08 RX ADMIN — PANTOPRAZOLE SODIUM 40 MG: 40 TABLET, DELAYED RELEASE ORAL at 06:20

## 2019-10-08 RX ADMIN — GABAPENTIN 1800 MG: 600 TABLET, FILM COATED ORAL at 20:52

## 2019-10-08 RX ADMIN — FUROSEMIDE 20 MG: 20 TABLET ORAL at 08:33

## 2019-10-08 RX ADMIN — Medication 10 ML: at 20:52

## 2019-10-08 RX ADMIN — GABAPENTIN 600 MG: 600 TABLET, FILM COATED ORAL at 08:33

## 2019-10-08 RX ADMIN — CEFTRIAXONE SODIUM 2 G: 2 INJECTION, POWDER, FOR SOLUTION INTRAMUSCULAR; INTRAVENOUS at 12:43

## 2019-10-08 RX ADMIN — OXYBUTYNIN CHLORIDE 5 MG: 5 TABLET ORAL at 20:52

## 2019-10-08 RX ADMIN — ASPIRIN 325 MG ORAL TABLET 325 MG: 325 PILL ORAL at 08:33

## 2019-10-08 RX ADMIN — GABAPENTIN 1200 MG: 600 TABLET, FILM COATED ORAL at 12:39

## 2019-10-08 RX ADMIN — OXYBUTYNIN CHLORIDE 5 MG: 5 TABLET ORAL at 15:17

## 2019-10-08 RX ADMIN — ENOXAPARIN SODIUM 40 MG: 40 INJECTION SUBCUTANEOUS at 08:33

## 2019-10-08 RX ADMIN — SODIUM CHLORIDE: 9 INJECTION, SOLUTION INTRAVENOUS at 17:33

## 2019-10-08 RX ADMIN — TAMSULOSIN HYDROCHLORIDE 0.8 MG: 0.4 CAPSULE ORAL at 08:33

## 2019-10-08 RX ADMIN — ATORVASTATIN CALCIUM 80 MG: 80 TABLET, FILM COATED ORAL at 20:52

## 2019-10-08 RX ADMIN — Medication 10 MG: at 20:52

## 2019-10-08 ASSESSMENT — PAIN SCALES - GENERAL
PAINLEVEL_OUTOF10: 0
PAINLEVEL_OUTOF10: 0

## 2019-10-09 VITALS
SYSTOLIC BLOOD PRESSURE: 98 MMHG | OXYGEN SATURATION: 96 % | HEART RATE: 69 BPM | RESPIRATION RATE: 15 BRPM | DIASTOLIC BLOOD PRESSURE: 55 MMHG | TEMPERATURE: 97.3 F

## 2019-10-09 PROCEDURE — 6370000000 HC RX 637 (ALT 250 FOR IP): Performed by: NURSE PRACTITIONER

## 2019-10-09 PROCEDURE — 6360000002 HC RX W HCPCS: Performed by: INTERNAL MEDICINE

## 2019-10-09 PROCEDURE — 2580000003 HC RX 258: Performed by: INTERNAL MEDICINE

## 2019-10-09 PROCEDURE — 6370000000 HC RX 637 (ALT 250 FOR IP): Performed by: INTERNAL MEDICINE

## 2019-10-09 PROCEDURE — 97110 THERAPEUTIC EXERCISES: CPT

## 2019-10-09 PROCEDURE — 97530 THERAPEUTIC ACTIVITIES: CPT

## 2019-10-09 RX ORDER — CALCIUM CARBONATE 200(500)MG
500 TABLET,CHEWABLE ORAL 3 TIMES DAILY PRN
Status: DISCONTINUED | OUTPATIENT
Start: 2019-10-09 | End: 2019-10-09 | Stop reason: HOSPADM

## 2019-10-09 RX ORDER — CEFUROXIME AXETIL 500 MG/1
500 TABLET ORAL 2 TIMES DAILY
Qty: 10 TABLET | Refills: 0 | Status: SHIPPED | OUTPATIENT
Start: 2019-10-09 | End: 2019-10-14

## 2019-10-09 RX ORDER — OXYBUTYNIN CHLORIDE 5 MG/1
5 TABLET ORAL 3 TIMES DAILY
Qty: 90 TABLET | Refills: 1 | Status: SHIPPED | OUTPATIENT
Start: 2019-10-09 | End: 2019-12-17 | Stop reason: CLARIF

## 2019-10-09 RX ADMIN — CEFTRIAXONE SODIUM 2 G: 2 INJECTION, POWDER, FOR SOLUTION INTRAMUSCULAR; INTRAVENOUS at 10:18

## 2019-10-09 RX ADMIN — ASPIRIN 325 MG ORAL TABLET 325 MG: 325 PILL ORAL at 10:17

## 2019-10-09 RX ADMIN — OXYBUTYNIN CHLORIDE 5 MG: 5 TABLET ORAL at 10:17

## 2019-10-09 RX ADMIN — ENOXAPARIN SODIUM 40 MG: 40 INJECTION SUBCUTANEOUS at 10:17

## 2019-10-09 RX ADMIN — OXYBUTYNIN CHLORIDE 5 MG: 5 TABLET ORAL at 13:23

## 2019-10-09 RX ADMIN — GABAPENTIN 600 MG: 600 TABLET, FILM COATED ORAL at 10:19

## 2019-10-09 RX ADMIN — GABAPENTIN 1200 MG: 600 TABLET, FILM COATED ORAL at 12:26

## 2019-10-09 RX ADMIN — ONDANSETRON 4 MG: 2 INJECTION INTRAMUSCULAR; INTRAVENOUS at 00:37

## 2019-10-09 RX ADMIN — Medication 10 ML: at 10:18

## 2019-10-09 RX ADMIN — TAMSULOSIN HYDROCHLORIDE 0.8 MG: 0.4 CAPSULE ORAL at 10:17

## 2019-10-09 RX ADMIN — METOPROLOL SUCCINATE 12.5 MG: 25 TABLET, EXTENDED RELEASE ORAL at 10:56

## 2019-10-09 RX ADMIN — PANTOPRAZOLE SODIUM 40 MG: 40 TABLET, DELAYED RELEASE ORAL at 05:57

## 2019-10-10 LAB
ORGANISM: ABNORMAL
URINE CULTURE, ROUTINE: ABNORMAL

## 2019-10-12 LAB
BLOOD CULTURE, ROUTINE: NORMAL
CULTURE, BLOOD 2: NORMAL

## 2019-10-26 ENCOUNTER — HOSPITAL ENCOUNTER (EMERGENCY)
Age: 79
Discharge: HOME | End: 2019-10-26
Payer: MEDICARE

## 2019-10-26 VITALS
BODY MASS INDEX: 25.45 KG/M2 | DIASTOLIC BLOOD PRESSURE: 56 MMHG | HEART RATE: 74 BPM | TEMPERATURE: 97.7 F | RESPIRATION RATE: 20 BRPM | OXYGEN SATURATION: 100 % | WEIGHT: 182.5 LBS | SYSTOLIC BLOOD PRESSURE: 148 MMHG

## 2019-10-26 LAB
BACTERIA: ABNORMAL /HPF
BILIRUBIN URINE: NEGATIVE
BLOOD, URINE: ABNORMAL
CLARITY: CLEAR
COLOR: YELLOW
EPITHELIAL CELLS, UA: ABNORMAL /HPF
GLUCOSE URINE: NEGATIVE MG/DL
KETONES, URINE: 15 MG/DL
LEUKOCYTE ESTERASE, URINE: ABNORMAL
MICROSCOPIC EXAMINATION: YES
NITRITE, URINE: POSITIVE
PH UA: 7 (ref 5–8)
PROTEIN UA: 100 MG/DL
RBC UA: ABNORMAL /HPF (ref 0–2)
SPECIFIC GRAVITY UA: 1.02 (ref 1–1.03)
URINE TYPE: ABNORMAL
UROBILINOGEN, URINE: 0.2 E.U./DL
WBC UA: ABNORMAL /HPF (ref 0–5)

## 2019-10-26 PROCEDURE — 87086 URINE CULTURE/COLONY COUNT: CPT

## 2019-10-26 PROCEDURE — 87077 CULTURE AEROBIC IDENTIFY: CPT

## 2019-10-26 PROCEDURE — 87186 SC STD MICRODIL/AGAR DIL: CPT

## 2019-10-26 PROCEDURE — 99284 EMERGENCY DEPT VISIT MOD MDM: CPT

## 2019-10-26 PROCEDURE — 51702 INSERT TEMP BLADDER CATH: CPT

## 2019-10-26 PROCEDURE — 81001 URINALYSIS AUTO W/SCOPE: CPT

## 2019-10-26 NOTE — ED PROVIDER NOTES
ULTRASOUND:      RECENT VITALS:  BP: 134/73, Temp: 97.7 °F (36.5 °C), Pulse: 79, Resp: 20, SpO2: 96 %     Procedures         ED Course     Nursing Notes, Past Medical Hx, Past Surgical Hx, Social Hx, Allergies, and Family Hx were reviewed. The patient was given the following medications:  No orders of the defined types were placed in this encounter. CONSULTS:  None    MEDICAL DECISION MAKING / ASSESSMENT / PLAN     Alba Munoz is a 67 y.o. 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 His Iglesias catheter was replaced. Urinalysis revealed chronic nitrite and leukocyte Estrace elevation, 1+ bacteria, 10-20 white blood cells. He does not endorse any symptoms concerning for symptomatic cystitis and we will withhold antibiotics at this time until cultures result. Secondarily has a 3 cm fluctuant bulla over the right lower extremity with chronic 2 pitting edema and surrounding hyperemia but no cellulitic changes. His lack of acute change makes his physician for accompanying DVT low. Negative Nikolsky sign. The single bulla is most likely related to his chronic severe edema of his lower extremities. Suspicion for bullous impetigo is low. He was instructed on proper wound care when the bulla ruptures, and return precautions. He understands that he should follow-up his primary care doctor should multiple to begin to appear, if he has a fever greater than 101, or if he has more severe symptoms representing another severe etiology. This patient was also evaluated by the attending physician. All care plans were discussed and agreed upon. Clinical Impression     1. Encounter for assessment of Iglesias catheter    2. Chronic indwelling Iglesias catheter    3.  Skin bulla        Disposition     PATIENT REFERRED TO:  Rito Tran    Schedule an appointment as soon as possible for a visit       Mónica Alvarado MD  [address] [place] [address]

## 2019-10-28 LAB
ORGANISM: ABNORMAL
URINE CULTURE, ROUTINE: ABNORMAL

## 2019-11-19 ENCOUNTER — HOSPITAL ENCOUNTER (INPATIENT)
Age: 79
LOS: 3 days | Discharge: HOME | DRG: 699 | End: 2019-11-22
Attending: EMERGENCY MEDICINE | Admitting: INTERNAL MEDICINE
Payer: MEDICARE

## 2019-11-19 DIAGNOSIS — T83.511A URINARY TRACT INFECTION ASSOCIATED WITH INDWELLING URETHRAL CATHETER, INITIAL ENCOUNTER (HCC): Primary | ICD-10-CM

## 2019-11-19 DIAGNOSIS — N39.0 URINARY TRACT INFECTION ASSOCIATED WITH INDWELLING URETHRAL CATHETER, INITIAL ENCOUNTER (HCC): Primary | ICD-10-CM

## 2019-11-19 LAB
A/G RATIO: 0.8 (ref 1.1–2.2)
ALBUMIN SERPL-MCNC: 3.2 G/DL (ref 3.4–5)
ALP BLD-CCNC: 126 U/L (ref 40–129)
ALT SERPL-CCNC: 16 U/L (ref 10–40)
ANION GAP SERPL CALCULATED.3IONS-SCNC: 10 MMOL/L (ref 3–16)
AST SERPL-CCNC: 14 U/L (ref 15–37)
BACTERIA: ABNORMAL /HPF
BASOPHILS ABSOLUTE: 0.1 K/UL (ref 0–0.2)
BASOPHILS RELATIVE PERCENT: 0.8 %
BILIRUB SERPL-MCNC: <0.2 MG/DL (ref 0–1)
BILIRUBIN URINE: NEGATIVE
BLOOD, URINE: ABNORMAL
BUN BLDV-MCNC: 38 MG/DL (ref 7–20)
CALCIUM SERPL-MCNC: 8.9 MG/DL (ref 8.3–10.6)
CHLORIDE BLD-SCNC: 106 MMOL/L (ref 99–110)
CLARITY: ABNORMAL
CO2: 25 MMOL/L (ref 21–32)
COLOR: YELLOW
CREAT SERPL-MCNC: 1 MG/DL (ref 0.8–1.3)
EOSINOPHILS ABSOLUTE: 0.4 K/UL (ref 0–0.6)
EOSINOPHILS RELATIVE PERCENT: 5.5 %
EPITHELIAL CELLS, UA: ABNORMAL /HPF
GFR AFRICAN AMERICAN: >60
GFR NON-AFRICAN AMERICAN: >60
GLOBULIN: 4 G/DL
GLUCOSE BLD-MCNC: 100 MG/DL (ref 70–99)
GLUCOSE URINE: NEGATIVE MG/DL
HCT VFR BLD CALC: 36.2 % (ref 40.5–52.5)
HEMOGLOBIN: 12 G/DL (ref 13.5–17.5)
KETONES, URINE: NEGATIVE MG/DL
LEUKOCYTE ESTERASE, URINE: ABNORMAL
LYMPHOCYTES ABSOLUTE: 1 K/UL (ref 1–5.1)
LYMPHOCYTES RELATIVE PERCENT: 13.6 %
MCH RBC QN AUTO: 31.6 PG (ref 26–34)
MCHC RBC AUTO-ENTMCNC: 33.1 G/DL (ref 31–36)
MCV RBC AUTO: 95.5 FL (ref 80–100)
MICROSCOPIC EXAMINATION: YES
MONOCYTES ABSOLUTE: 0.7 K/UL (ref 0–1.3)
MONOCYTES RELATIVE PERCENT: 9 %
NEUTROPHILS ABSOLUTE: 5.4 K/UL (ref 1.7–7.7)
NEUTROPHILS RELATIVE PERCENT: 71.1 %
NITRITE, URINE: POSITIVE
PDW BLD-RTO: 14.9 % (ref 12.4–15.4)
PH UA: 7 (ref 5–8)
PLATELET # BLD: 186 K/UL (ref 135–450)
PMV BLD AUTO: 8.6 FL (ref 5–10.5)
POTASSIUM REFLEX MAGNESIUM: 5 MMOL/L (ref 3.5–5.1)
PROTEIN UA: NEGATIVE MG/DL
RBC # BLD: 3.79 M/UL (ref 4.2–5.9)
RBC UA: ABNORMAL /HPF (ref 0–2)
SODIUM BLD-SCNC: 141 MMOL/L (ref 136–145)
SPECIFIC GRAVITY UA: 1.01 (ref 1–1.03)
TOTAL PROTEIN: 7.2 G/DL (ref 6.4–8.2)
URINE TYPE: ABNORMAL
UROBILINOGEN, URINE: 1 E.U./DL
WBC # BLD: 7.5 K/UL (ref 4–11)
WBC UA: >100 /HPF (ref 0–5)

## 2019-11-19 PROCEDURE — 80053 COMPREHEN METABOLIC PANEL: CPT

## 2019-11-19 PROCEDURE — 6360000002 HC RX W HCPCS: Performed by: EMERGENCY MEDICINE

## 2019-11-19 PROCEDURE — 99285 EMERGENCY DEPT VISIT HI MDM: CPT

## 2019-11-19 PROCEDURE — 87077 CULTURE AEROBIC IDENTIFY: CPT

## 2019-11-19 PROCEDURE — 51702 INSERT TEMP BLADDER CATH: CPT

## 2019-11-19 PROCEDURE — 96365 THER/PROPH/DIAG IV INF INIT: CPT

## 2019-11-19 PROCEDURE — 1200000000 HC SEMI PRIVATE

## 2019-11-19 PROCEDURE — 87186 SC STD MICRODIL/AGAR DIL: CPT

## 2019-11-19 PROCEDURE — 2580000003 HC RX 258: Performed by: EMERGENCY MEDICINE

## 2019-11-19 PROCEDURE — 85025 COMPLETE CBC W/AUTO DIFF WBC: CPT

## 2019-11-19 PROCEDURE — 81001 URINALYSIS AUTO W/SCOPE: CPT

## 2019-11-19 PROCEDURE — 87086 URINE CULTURE/COLONY COUNT: CPT

## 2019-11-19 RX ORDER — GABAPENTIN 600 MG/1
1200 TABLET ORAL
Status: DISCONTINUED | OUTPATIENT
Start: 2019-11-20 | End: 2019-11-22 | Stop reason: HOSPADM

## 2019-11-19 RX ORDER — UREA 10 %
10 LOTION (ML) TOPICAL PRN
Status: DISCONTINUED | OUTPATIENT
Start: 2019-11-19 | End: 2019-11-22 | Stop reason: HOSPADM

## 2019-11-19 RX ORDER — TAMSULOSIN HYDROCHLORIDE 0.4 MG/1
0.8 CAPSULE ORAL DAILY
Status: DISCONTINUED | OUTPATIENT
Start: 2019-11-20 | End: 2019-11-20

## 2019-11-19 RX ORDER — SPIRONOLACTONE 25 MG/1
25 TABLET ORAL 2 TIMES DAILY
Status: DISCONTINUED | OUTPATIENT
Start: 2019-11-19 | End: 2019-11-22 | Stop reason: HOSPADM

## 2019-11-19 RX ORDER — SODIUM CHLORIDE 9 MG/ML
25 INJECTION, SOLUTION INTRAVENOUS PRN
Status: DISCONTINUED | OUTPATIENT
Start: 2019-11-19 | End: 2019-11-20

## 2019-11-19 RX ORDER — ATORVASTATIN CALCIUM 80 MG/1
80 TABLET, FILM COATED ORAL NIGHTLY
Status: DISCONTINUED | OUTPATIENT
Start: 2019-11-19 | End: 2019-11-22 | Stop reason: HOSPADM

## 2019-11-19 RX ORDER — SODIUM CHLORIDE 0.9 % (FLUSH) 0.9 %
10 SYRINGE (ML) INJECTION PRN
Status: DISCONTINUED | OUTPATIENT
Start: 2019-11-19 | End: 2019-11-22 | Stop reason: HOSPADM

## 2019-11-19 RX ORDER — ISOSORBIDE MONONITRATE 30 MG/1
30 TABLET, EXTENDED RELEASE ORAL NIGHTLY
Status: DISCONTINUED | OUTPATIENT
Start: 2019-11-19 | End: 2019-11-21

## 2019-11-19 RX ORDER — FUROSEMIDE 20 MG/1
20 TABLET ORAL DAILY
Status: DISCONTINUED | OUTPATIENT
Start: 2019-11-20 | End: 2019-11-22 | Stop reason: HOSPADM

## 2019-11-19 RX ORDER — SODIUM CHLORIDE 0.9 % (FLUSH) 0.9 %
10 SYRINGE (ML) INJECTION EVERY 12 HOURS SCHEDULED
Status: DISCONTINUED | OUTPATIENT
Start: 2019-11-19 | End: 2019-11-22 | Stop reason: HOSPADM

## 2019-11-19 RX ORDER — ACETAMINOPHEN 325 MG/1
650 TABLET ORAL EVERY 4 HOURS PRN
Status: DISCONTINUED | OUTPATIENT
Start: 2019-11-19 | End: 2019-11-22 | Stop reason: HOSPADM

## 2019-11-19 RX ORDER — ASPIRIN 325 MG
325 TABLET ORAL DAILY
Status: DISCONTINUED | OUTPATIENT
Start: 2019-11-20 | End: 2019-11-22 | Stop reason: HOSPADM

## 2019-11-19 RX ORDER — ONDANSETRON 2 MG/ML
4 INJECTION INTRAMUSCULAR; INTRAVENOUS EVERY 4 HOURS PRN
Status: DISCONTINUED | OUTPATIENT
Start: 2019-11-19 | End: 2019-11-22 | Stop reason: HOSPADM

## 2019-11-19 RX ORDER — ZOLPIDEM TARTRATE 5 MG/1
10 TABLET ORAL NIGHTLY PRN
Status: DISCONTINUED | OUTPATIENT
Start: 2019-11-19 | End: 2019-11-22 | Stop reason: HOSPADM

## 2019-11-19 RX ORDER — GABAPENTIN 600 MG/1
1800 TABLET ORAL NIGHTLY
Status: DISCONTINUED | OUTPATIENT
Start: 2019-11-20 | End: 2019-11-22 | Stop reason: HOSPADM

## 2019-11-19 RX ORDER — PANTOPRAZOLE SODIUM 40 MG/1
40 GRANULE, DELAYED RELEASE ORAL
Status: DISCONTINUED | OUTPATIENT
Start: 2019-11-20 | End: 2019-11-22 | Stop reason: HOSPADM

## 2019-11-19 RX ORDER — METOPROLOL SUCCINATE 25 MG/1
25 TABLET, EXTENDED RELEASE ORAL DAILY
Status: DISCONTINUED | OUTPATIENT
Start: 2019-11-20 | End: 2019-11-22 | Stop reason: HOSPADM

## 2019-11-19 RX ORDER — GABAPENTIN 600 MG/1
600 TABLET ORAL DAILY
Status: DISCONTINUED | OUTPATIENT
Start: 2019-11-20 | End: 2019-11-22 | Stop reason: HOSPADM

## 2019-11-19 RX ADMIN — PIPERACILLIN AND TAZOBACTAM 3.38 G: 3; .375 INJECTION, POWDER, LYOPHILIZED, FOR SOLUTION INTRAVENOUS at 17:50

## 2019-11-19 ASSESSMENT — PAIN DESCRIPTION - FREQUENCY: FREQUENCY: CONTINUOUS

## 2019-11-19 ASSESSMENT — PAIN DESCRIPTION - PAIN TYPE: TYPE: ACUTE PAIN

## 2019-11-19 ASSESSMENT — PAIN SCALES - GENERAL: PAINLEVEL_OUTOF10: 10

## 2019-11-20 ENCOUNTER — APPOINTMENT (OUTPATIENT)
Dept: GENERAL RADIOLOGY | Age: 79
DRG: 699 | End: 2019-11-20
Payer: MEDICARE

## 2019-11-20 PROBLEM — Z97.8 CHRONIC INDWELLING FOLEY CATHETER: Status: ACTIVE | Noted: 2019-11-20

## 2019-11-20 PROBLEM — E78.5 HYPERLIPIDEMIA: Status: ACTIVE | Noted: 2019-11-20

## 2019-11-20 PROBLEM — M25.512 ACUTE PAIN OF LEFT SHOULDER: Status: ACTIVE | Noted: 2019-11-20

## 2019-11-20 PROBLEM — L03.115 BILATERAL LOWER LEG CELLULITIS: Status: ACTIVE | Noted: 2019-11-20

## 2019-11-20 PROBLEM — L03.116 BILATERAL LOWER LEG CELLULITIS: Status: ACTIVE | Noted: 2019-11-20

## 2019-11-20 PROBLEM — R82.71 BACTERIURIA: Status: ACTIVE | Noted: 2019-11-20

## 2019-11-20 PROBLEM — N31.9 NEUROGENIC BLADDER: Status: ACTIVE | Noted: 2019-11-20

## 2019-11-20 PROBLEM — Q64.70 ABNORMALITY OF URETHRAL MEATUS: Status: ACTIVE | Noted: 2019-11-20

## 2019-11-20 LAB
ANION GAP SERPL CALCULATED.3IONS-SCNC: 11 MMOL/L (ref 3–16)
BASOPHILS ABSOLUTE: 0.1 K/UL (ref 0–0.2)
BASOPHILS RELATIVE PERCENT: 0.8 %
BUN BLDV-MCNC: 36 MG/DL (ref 7–20)
CALCIUM SERPL-MCNC: 8.2 MG/DL (ref 8.3–10.6)
CHLORIDE BLD-SCNC: 107 MMOL/L (ref 99–110)
CO2: 21 MMOL/L (ref 21–32)
CREAT SERPL-MCNC: 1 MG/DL (ref 0.8–1.3)
EOSINOPHILS ABSOLUTE: 0.4 K/UL (ref 0–0.6)
EOSINOPHILS RELATIVE PERCENT: 5.7 %
GFR AFRICAN AMERICAN: >60
GFR NON-AFRICAN AMERICAN: >60
GLUCOSE BLD-MCNC: 100 MG/DL (ref 70–99)
HCT VFR BLD CALC: 32.7 % (ref 40.5–52.5)
HEMOGLOBIN: 10.7 G/DL (ref 13.5–17.5)
LYMPHOCYTES ABSOLUTE: 1 K/UL (ref 1–5.1)
LYMPHOCYTES RELATIVE PERCENT: 13.9 %
MCH RBC QN AUTO: 32.2 PG (ref 26–34)
MCHC RBC AUTO-ENTMCNC: 32.9 G/DL (ref 31–36)
MCV RBC AUTO: 98.1 FL (ref 80–100)
MONOCYTES ABSOLUTE: 0.6 K/UL (ref 0–1.3)
MONOCYTES RELATIVE PERCENT: 9.3 %
NEUTROPHILS ABSOLUTE: 4.9 K/UL (ref 1.7–7.7)
NEUTROPHILS RELATIVE PERCENT: 70.3 %
PDW BLD-RTO: 15.4 % (ref 12.4–15.4)
PLATELET # BLD: 183 K/UL (ref 135–450)
PMV BLD AUTO: 8.6 FL (ref 5–10.5)
POTASSIUM REFLEX MAGNESIUM: 5.1 MMOL/L (ref 3.5–5.1)
RBC # BLD: 3.33 M/UL (ref 4.2–5.9)
SODIUM BLD-SCNC: 139 MMOL/L (ref 136–145)
WBC # BLD: 6.9 K/UL (ref 4–11)

## 2019-11-20 PROCEDURE — 99223 1ST HOSP IP/OBS HIGH 75: CPT | Performed by: INTERNAL MEDICINE

## 2019-11-20 PROCEDURE — 6370000000 HC RX 637 (ALT 250 FOR IP): Performed by: INTERNAL MEDICINE

## 2019-11-20 PROCEDURE — 73030 X-RAY EXAM OF SHOULDER: CPT

## 2019-11-20 PROCEDURE — 1200000000 HC SEMI PRIVATE

## 2019-11-20 PROCEDURE — 85025 COMPLETE CBC W/AUTO DIFF WBC: CPT

## 2019-11-20 PROCEDURE — 36415 COLL VENOUS BLD VENIPUNCTURE: CPT

## 2019-11-20 PROCEDURE — 6360000002 HC RX W HCPCS: Performed by: INTERNAL MEDICINE

## 2019-11-20 PROCEDURE — 80048 BASIC METABOLIC PNL TOTAL CA: CPT

## 2019-11-20 PROCEDURE — 2580000003 HC RX 258: Performed by: INTERNAL MEDICINE

## 2019-11-20 RX ORDER — NYSTATIN 100000 U/G
CREAM TOPICAL 2 TIMES DAILY
Status: DISCONTINUED | OUTPATIENT
Start: 2019-11-20 | End: 2019-11-22 | Stop reason: HOSPADM

## 2019-11-20 RX ADMIN — ASPIRIN 325 MG ORAL TABLET 325 MG: 325 PILL ORAL at 09:47

## 2019-11-20 RX ADMIN — SPIRONOLACTONE 25 MG: 25 TABLET, FILM COATED ORAL at 09:47

## 2019-11-20 RX ADMIN — ZOLPIDEM TARTRATE 10 MG: 5 TABLET ORAL at 00:56

## 2019-11-20 RX ADMIN — ATORVASTATIN CALCIUM 80 MG: 80 TABLET, FILM COATED ORAL at 22:56

## 2019-11-20 RX ADMIN — MEROPENEM 1 G: 1 INJECTION, POWDER, FOR SOLUTION INTRAVENOUS at 09:46

## 2019-11-20 RX ADMIN — GABAPENTIN 1800 MG: 600 TABLET, FILM COATED ORAL at 22:57

## 2019-11-20 RX ADMIN — ENOXAPARIN SODIUM 40 MG: 40 INJECTION SUBCUTANEOUS at 09:46

## 2019-11-20 RX ADMIN — ATORVASTATIN CALCIUM 80 MG: 80 TABLET, FILM COATED ORAL at 00:57

## 2019-11-20 RX ADMIN — GABAPENTIN 1200 MG: 600 TABLET, FILM COATED ORAL at 14:00

## 2019-11-20 RX ADMIN — FUROSEMIDE 20 MG: 20 TABLET ORAL at 09:47

## 2019-11-20 RX ADMIN — Medication 10 ML: at 22:58

## 2019-11-20 RX ADMIN — METOPROLOL SUCCINATE 25 MG: 25 TABLET, EXTENDED RELEASE ORAL at 09:47

## 2019-11-20 RX ADMIN — ISOSORBIDE MONONITRATE 30 MG: 30 TABLET, EXTENDED RELEASE ORAL at 00:56

## 2019-11-20 RX ADMIN — Medication 10 ML: at 00:59

## 2019-11-20 RX ADMIN — ISOSORBIDE MONONITRATE 30 MG: 30 TABLET, EXTENDED RELEASE ORAL at 22:57

## 2019-11-20 RX ADMIN — SPIRONOLACTONE 25 MG: 25 TABLET, FILM COATED ORAL at 00:57

## 2019-11-20 RX ADMIN — GABAPENTIN 600 MG: 600 TABLET, FILM COATED ORAL at 09:47

## 2019-11-20 RX ADMIN — NYSTATIN: 100000 CREAM TOPICAL at 18:00

## 2019-11-20 RX ADMIN — Medication 10 MG: at 00:57

## 2019-11-20 RX ADMIN — MEROPENEM 1 G: 1 INJECTION, POWDER, FOR SOLUTION INTRAVENOUS at 01:03

## 2019-11-20 RX ADMIN — Medication 10 ML: at 09:47

## 2019-11-20 RX ADMIN — SPIRONOLACTONE 25 MG: 25 TABLET, FILM COATED ORAL at 23:01

## 2019-11-20 RX ADMIN — GABAPENTIN 1800 MG: 600 TABLET, FILM COATED ORAL at 00:56

## 2019-11-20 RX ADMIN — NYSTATIN: 100000 CREAM TOPICAL at 23:02

## 2019-11-20 RX ADMIN — MEROPENEM 1 G: 1 INJECTION, POWDER, FOR SOLUTION INTRAVENOUS at 16:21

## 2019-11-20 ASSESSMENT — PAIN SCALES - GENERAL
PAINLEVEL_OUTOF10: 3
PAINLEVEL_OUTOF10: 0

## 2019-11-20 ASSESSMENT — PAIN DESCRIPTION - ORIENTATION: ORIENTATION: LOWER

## 2019-11-20 ASSESSMENT — PAIN DESCRIPTION - PAIN TYPE: TYPE: CHRONIC PAIN

## 2019-11-20 ASSESSMENT — PAIN DESCRIPTION - LOCATION: LOCATION: BACK

## 2019-11-21 LAB
ANION GAP SERPL CALCULATED.3IONS-SCNC: 10 MMOL/L (ref 3–16)
BASOPHILS ABSOLUTE: 0 K/UL (ref 0–0.2)
BASOPHILS RELATIVE PERCENT: 0.4 %
BUN BLDV-MCNC: 34 MG/DL (ref 7–20)
CALCIUM SERPL-MCNC: 8 MG/DL (ref 8.3–10.6)
CHLORIDE BLD-SCNC: 108 MMOL/L (ref 99–110)
CO2: 22 MMOL/L (ref 21–32)
CREAT SERPL-MCNC: 1.1 MG/DL (ref 0.8–1.3)
EOSINOPHILS ABSOLUTE: 0.4 K/UL (ref 0–0.6)
EOSINOPHILS RELATIVE PERCENT: 4.6 %
GFR AFRICAN AMERICAN: >60
GFR NON-AFRICAN AMERICAN: >60
GLUCOSE BLD-MCNC: 121 MG/DL (ref 70–99)
HCT VFR BLD CALC: 31.6 % (ref 40.5–52.5)
HEMOGLOBIN: 10.3 G/DL (ref 13.5–17.5)
LYMPHOCYTES ABSOLUTE: 1.1 K/UL (ref 1–5.1)
LYMPHOCYTES RELATIVE PERCENT: 13.7 %
MCH RBC QN AUTO: 32 PG (ref 26–34)
MCHC RBC AUTO-ENTMCNC: 32.7 G/DL (ref 31–36)
MCV RBC AUTO: 97.9 FL (ref 80–100)
MONOCYTES ABSOLUTE: 0.7 K/UL (ref 0–1.3)
MONOCYTES RELATIVE PERCENT: 8.2 %
NEUTROPHILS ABSOLUTE: 6 K/UL (ref 1.7–7.7)
NEUTROPHILS RELATIVE PERCENT: 73.1 %
PDW BLD-RTO: 15.4 % (ref 12.4–15.4)
PLATELET # BLD: 185 K/UL (ref 135–450)
PMV BLD AUTO: 8.9 FL (ref 5–10.5)
POTASSIUM REFLEX MAGNESIUM: 5.3 MMOL/L (ref 3.5–5.1)
RBC # BLD: 3.22 M/UL (ref 4.2–5.9)
SODIUM BLD-SCNC: 140 MMOL/L (ref 136–145)
WBC # BLD: 8.2 K/UL (ref 4–11)

## 2019-11-21 PROCEDURE — 2580000003 HC RX 258: Performed by: INTERNAL MEDICINE

## 2019-11-21 PROCEDURE — 80048 BASIC METABOLIC PNL TOTAL CA: CPT

## 2019-11-21 PROCEDURE — 99233 SBSQ HOSP IP/OBS HIGH 50: CPT | Performed by: INTERNAL MEDICINE

## 2019-11-21 PROCEDURE — 6370000000 HC RX 637 (ALT 250 FOR IP): Performed by: INTERNAL MEDICINE

## 2019-11-21 PROCEDURE — 85025 COMPLETE CBC W/AUTO DIFF WBC: CPT

## 2019-11-21 PROCEDURE — 1200000000 HC SEMI PRIVATE

## 2019-11-21 PROCEDURE — 6360000002 HC RX W HCPCS: Performed by: INTERNAL MEDICINE

## 2019-11-21 PROCEDURE — 36415 COLL VENOUS BLD VENIPUNCTURE: CPT

## 2019-11-21 RX ADMIN — ASPIRIN 325 MG ORAL TABLET 325 MG: 325 PILL ORAL at 09:55

## 2019-11-21 RX ADMIN — MEROPENEM 1 G: 1 INJECTION, POWDER, FOR SOLUTION INTRAVENOUS at 09:54

## 2019-11-21 RX ADMIN — ATORVASTATIN CALCIUM 80 MG: 80 TABLET, FILM COATED ORAL at 22:02

## 2019-11-21 RX ADMIN — NYSTATIN: 100000 CREAM TOPICAL at 09:54

## 2019-11-21 RX ADMIN — MEROPENEM 1 G: 1 INJECTION, POWDER, FOR SOLUTION INTRAVENOUS at 01:13

## 2019-11-21 RX ADMIN — Medication 10 ML: at 22:20

## 2019-11-21 RX ADMIN — METOPROLOL SUCCINATE 25 MG: 25 TABLET, EXTENDED RELEASE ORAL at 09:55

## 2019-11-21 RX ADMIN — GABAPENTIN 600 MG: 600 TABLET, FILM COATED ORAL at 09:55

## 2019-11-21 RX ADMIN — Medication 10 ML: at 09:56

## 2019-11-21 RX ADMIN — ENOXAPARIN SODIUM 40 MG: 40 INJECTION SUBCUTANEOUS at 09:54

## 2019-11-21 RX ADMIN — ACETAMINOPHEN 650 MG: 325 TABLET ORAL at 15:47

## 2019-11-21 RX ADMIN — SPIRONOLACTONE 25 MG: 25 TABLET, FILM COATED ORAL at 09:55

## 2019-11-21 RX ADMIN — GABAPENTIN 1800 MG: 600 TABLET, FILM COATED ORAL at 22:14

## 2019-11-21 RX ADMIN — GABAPENTIN 1200 MG: 600 TABLET, FILM COATED ORAL at 12:08

## 2019-11-21 RX ADMIN — PANTOPRAZOLE SODIUM 40 MG: 40 GRANULE, DELAYED RELEASE ORAL at 09:54

## 2019-11-21 RX ADMIN — FUROSEMIDE 20 MG: 20 TABLET ORAL at 09:55

## 2019-11-21 RX ADMIN — MEROPENEM 1 G: 1 INJECTION, POWDER, FOR SOLUTION INTRAVENOUS at 15:42

## 2019-11-21 RX ADMIN — NYSTATIN: 100000 CREAM TOPICAL at 22:16

## 2019-11-21 RX ADMIN — SPIRONOLACTONE 25 MG: 25 TABLET, FILM COATED ORAL at 22:03

## 2019-11-21 ASSESSMENT — PAIN DESCRIPTION - FREQUENCY: FREQUENCY: CONTINUOUS

## 2019-11-21 ASSESSMENT — PAIN SCALES - GENERAL
PAINLEVEL_OUTOF10: 7
PAINLEVEL_OUTOF10: 6
PAINLEVEL_OUTOF10: 6
PAINLEVEL_OUTOF10: 3

## 2019-11-21 ASSESSMENT — PAIN DESCRIPTION - LOCATION
LOCATION: LEG
LOCATION: FOOT

## 2019-11-21 ASSESSMENT — PAIN DESCRIPTION - PROGRESSION: CLINICAL_PROGRESSION: NOT CHANGED

## 2019-11-21 ASSESSMENT — PAIN DESCRIPTION - ONSET: ONSET: ON-GOING

## 2019-11-21 ASSESSMENT — PAIN DESCRIPTION - PAIN TYPE
TYPE: CHRONIC PAIN
TYPE: CHRONIC PAIN

## 2019-11-21 ASSESSMENT — PAIN - FUNCTIONAL ASSESSMENT: PAIN_FUNCTIONAL_ASSESSMENT: PREVENTS OR INTERFERES SOME ACTIVE ACTIVITIES AND ADLS

## 2019-11-21 ASSESSMENT — PAIN DESCRIPTION - ORIENTATION
ORIENTATION: RIGHT;LEFT
ORIENTATION: RIGHT

## 2019-11-21 ASSESSMENT — PAIN DESCRIPTION - DESCRIPTORS: DESCRIPTORS: NUMBNESS

## 2019-11-22 VITALS
SYSTOLIC BLOOD PRESSURE: 132 MMHG | HEART RATE: 73 BPM | WEIGHT: 182.5 LBS | OXYGEN SATURATION: 96 % | RESPIRATION RATE: 16 BRPM | DIASTOLIC BLOOD PRESSURE: 68 MMHG | TEMPERATURE: 97.2 F | BODY MASS INDEX: 25.45 KG/M2

## 2019-11-22 PROBLEM — M25.512 ACUTE PAIN OF LEFT SHOULDER: Status: RESOLVED | Noted: 2019-11-20 | Resolved: 2019-11-22

## 2019-11-22 LAB
ANION GAP SERPL CALCULATED.3IONS-SCNC: 10 MMOL/L (ref 3–16)
BASOPHILS ABSOLUTE: 0.1 K/UL (ref 0–0.2)
BASOPHILS RELATIVE PERCENT: 0.7 %
BUN BLDV-MCNC: 35 MG/DL (ref 7–20)
CALCIUM SERPL-MCNC: 8.5 MG/DL (ref 8.3–10.6)
CHLORIDE BLD-SCNC: 108 MMOL/L (ref 99–110)
CO2: 22 MMOL/L (ref 21–32)
CREAT SERPL-MCNC: 1 MG/DL (ref 0.8–1.3)
EOSINOPHILS ABSOLUTE: 0.5 K/UL (ref 0–0.6)
EOSINOPHILS RELATIVE PERCENT: 5.9 %
GFR AFRICAN AMERICAN: >60
GFR NON-AFRICAN AMERICAN: >60
GLUCOSE BLD-MCNC: 92 MG/DL (ref 70–99)
HCT VFR BLD CALC: 32.8 % (ref 40.5–52.5)
HEMOGLOBIN: 10.7 G/DL (ref 13.5–17.5)
LYMPHOCYTES ABSOLUTE: 1.3 K/UL (ref 1–5.1)
LYMPHOCYTES RELATIVE PERCENT: 15.8 %
MCH RBC QN AUTO: 32.2 PG (ref 26–34)
MCHC RBC AUTO-ENTMCNC: 32.7 G/DL (ref 31–36)
MCV RBC AUTO: 98.6 FL (ref 80–100)
MONOCYTES ABSOLUTE: 0.8 K/UL (ref 0–1.3)
MONOCYTES RELATIVE PERCENT: 9.1 %
NEUTROPHILS ABSOLUTE: 5.7 K/UL (ref 1.7–7.7)
NEUTROPHILS RELATIVE PERCENT: 68.5 %
ORGANISM: ABNORMAL
PDW BLD-RTO: 15.6 % (ref 12.4–15.4)
PLATELET # BLD: 177 K/UL (ref 135–450)
PMV BLD AUTO: 8.6 FL (ref 5–10.5)
POTASSIUM REFLEX MAGNESIUM: 5.5 MMOL/L (ref 3.5–5.1)
RBC # BLD: 3.33 M/UL (ref 4.2–5.9)
SODIUM BLD-SCNC: 140 MMOL/L (ref 136–145)
URINE CULTURE, ROUTINE: ABNORMAL
WBC # BLD: 8.3 K/UL (ref 4–11)

## 2019-11-22 PROCEDURE — 6360000002 HC RX W HCPCS: Performed by: INTERNAL MEDICINE

## 2019-11-22 PROCEDURE — 2580000003 HC RX 258: Performed by: INTERNAL MEDICINE

## 2019-11-22 PROCEDURE — 99233 SBSQ HOSP IP/OBS HIGH 50: CPT | Performed by: INTERNAL MEDICINE

## 2019-11-22 PROCEDURE — 80048 BASIC METABOLIC PNL TOTAL CA: CPT

## 2019-11-22 PROCEDURE — 6370000000 HC RX 637 (ALT 250 FOR IP): Performed by: INTERNAL MEDICINE

## 2019-11-22 PROCEDURE — 85025 COMPLETE CBC W/AUTO DIFF WBC: CPT

## 2019-11-22 PROCEDURE — 36415 COLL VENOUS BLD VENIPUNCTURE: CPT

## 2019-11-22 RX ORDER — DIPHENHYDRAMINE HCL 25 MG
12.5 TABLET ORAL EVERY 6 HOURS PRN
Qty: 10 TABLET | Refills: 0 | Status: SHIPPED | OUTPATIENT
Start: 2019-11-22 | End: 2019-12-17 | Stop reason: CLARIF

## 2019-11-22 RX ORDER — AMOXICILLIN AND CLAVULANATE POTASSIUM 875; 125 MG/1; MG/1
1 TABLET, FILM COATED ORAL 2 TIMES DAILY
Qty: 28 TABLET | Refills: 0 | Status: SHIPPED | OUTPATIENT
Start: 2019-11-22 | End: 2019-12-06

## 2019-11-22 RX ORDER — AMOXICILLIN AND CLAVULANATE POTASSIUM 875; 125 MG/1; MG/1
1 TABLET, FILM COATED ORAL 2 TIMES DAILY
Qty: 28 TABLET | Refills: 0 | Status: SHIPPED | OUTPATIENT
Start: 2019-11-22 | End: 2019-11-22 | Stop reason: SDUPTHER

## 2019-11-22 RX ORDER — DIPHENHYDRAMINE HCL 25 MG
12.5 TABLET ORAL EVERY 6 HOURS PRN
Qty: 10 TABLET | Refills: 0 | Status: SHIPPED | OUTPATIENT
Start: 2019-11-22 | End: 2019-11-22

## 2019-11-22 RX ORDER — NYSTATIN 100000 U/G
CREAM TOPICAL
Qty: 1 TUBE | Refills: 2 | Status: SHIPPED | OUTPATIENT
Start: 2019-11-22 | End: 2019-11-22

## 2019-11-22 RX ORDER — NYSTATIN 100000 U/G
CREAM TOPICAL
Qty: 1 TUBE | Refills: 2 | Status: SHIPPED | OUTPATIENT
Start: 2019-11-22 | End: 2019-12-17 | Stop reason: CLARIF

## 2019-11-22 RX ORDER — DIPHENHYDRAMINE HCL 25 MG
12.5 TABLET ORAL EVERY 6 HOURS PRN
Status: DISCONTINUED | OUTPATIENT
Start: 2019-11-22 | End: 2019-11-22 | Stop reason: HOSPADM

## 2019-11-22 RX ADMIN — ASPIRIN 325 MG ORAL TABLET 325 MG: 325 PILL ORAL at 09:27

## 2019-11-22 RX ADMIN — ENOXAPARIN SODIUM 40 MG: 40 INJECTION SUBCUTANEOUS at 09:28

## 2019-11-22 RX ADMIN — NYSTATIN: 100000 CREAM TOPICAL at 09:28

## 2019-11-22 RX ADMIN — MEROPENEM 1 G: 1 INJECTION, POWDER, FOR SOLUTION INTRAVENOUS at 09:28

## 2019-11-22 RX ADMIN — Medication 10 ML: at 09:29

## 2019-11-22 RX ADMIN — SPIRONOLACTONE 25 MG: 25 TABLET, FILM COATED ORAL at 09:28

## 2019-11-22 RX ADMIN — MEROPENEM 1 G: 1 INJECTION, POWDER, FOR SOLUTION INTRAVENOUS at 00:17

## 2019-11-22 RX ADMIN — GABAPENTIN 600 MG: 600 TABLET, FILM COATED ORAL at 09:27

## 2019-11-22 RX ADMIN — DIPHENHYDRAMINE HYDROCHLORIDE 12.5 MG: 25 TABLET ORAL at 12:03

## 2019-11-22 RX ADMIN — MEROPENEM 1 G: 1 INJECTION, POWDER, FOR SOLUTION INTRAVENOUS at 16:41

## 2019-11-22 RX ADMIN — GABAPENTIN 1200 MG: 600 TABLET, FILM COATED ORAL at 12:03

## 2019-11-22 RX ADMIN — FUROSEMIDE 20 MG: 20 TABLET ORAL at 09:27

## 2019-11-22 RX ADMIN — METOPROLOL SUCCINATE 25 MG: 25 TABLET, EXTENDED RELEASE ORAL at 09:27

## 2019-11-22 RX ADMIN — PANTOPRAZOLE SODIUM 40 MG: 40 GRANULE, DELAYED RELEASE ORAL at 05:52

## 2019-11-22 ASSESSMENT — PAIN DESCRIPTION - PROGRESSION: CLINICAL_PROGRESSION: NOT CHANGED

## 2019-11-22 ASSESSMENT — PAIN DESCRIPTION - FREQUENCY: FREQUENCY: CONTINUOUS

## 2019-11-22 ASSESSMENT — PAIN DESCRIPTION - ORIENTATION: ORIENTATION: RIGHT;LEFT

## 2019-11-22 ASSESSMENT — PAIN DESCRIPTION - DESCRIPTORS: DESCRIPTORS: NUMBNESS

## 2019-11-22 ASSESSMENT — PAIN SCALES - GENERAL
PAINLEVEL_OUTOF10: 3
PAINLEVEL_OUTOF10: 3

## 2019-11-22 ASSESSMENT — PAIN - FUNCTIONAL ASSESSMENT: PAIN_FUNCTIONAL_ASSESSMENT: PREVENTS OR INTERFERES SOME ACTIVE ACTIVITIES AND ADLS

## 2019-11-22 ASSESSMENT — PAIN DESCRIPTION - PAIN TYPE: TYPE: CHRONIC PAIN

## 2019-11-22 ASSESSMENT — PAIN DESCRIPTION - ONSET: ONSET: ON-GOING

## 2019-11-22 ASSESSMENT — PAIN DESCRIPTION - LOCATION: LOCATION: FOOT

## 2019-12-17 ENCOUNTER — HOSPITAL ENCOUNTER (INPATIENT)
Age: 79
LOS: 3 days | Discharge: HOME | DRG: 696 | End: 2019-12-20
Attending: EMERGENCY MEDICINE | Admitting: INTERNAL MEDICINE
Payer: MEDICARE

## 2019-12-17 DIAGNOSIS — R31.0 GROSS HEMATURIA: Primary | ICD-10-CM

## 2019-12-17 LAB
ABO/RH: NORMAL
ANION GAP SERPL CALCULATED.3IONS-SCNC: 10 MMOL/L (ref 3–16)
ANTIBODY SCREEN: NORMAL
APTT: 32 SEC (ref 24.2–36.2)
BACTERIA: ABNORMAL /HPF
BASOPHILS ABSOLUTE: 0.1 K/UL (ref 0–0.2)
BASOPHILS RELATIVE PERCENT: 0.4 %
BILIRUBIN URINE: NEGATIVE
BLOOD, URINE: ABNORMAL
BUN BLDV-MCNC: 49 MG/DL (ref 7–20)
CALCIUM SERPL-MCNC: 9.3 MG/DL (ref 8.3–10.6)
CHLORIDE BLD-SCNC: 105 MMOL/L (ref 99–110)
CLARITY: ABNORMAL
CO2: 21 MMOL/L (ref 21–32)
COLOR: ABNORMAL
CREAT SERPL-MCNC: 1.4 MG/DL (ref 0.8–1.3)
EOSINOPHILS ABSOLUTE: 0 K/UL (ref 0–0.6)
EOSINOPHILS RELATIVE PERCENT: 0.3 %
GFR AFRICAN AMERICAN: 59
GFR NON-AFRICAN AMERICAN: 49
GLUCOSE BLD-MCNC: 151 MG/DL (ref 70–99)
GLUCOSE URINE: 250 MG/DL
HCT VFR BLD CALC: 33.4 % (ref 40.5–52.5)
HCT VFR BLD CALC: 41.3 % (ref 40.5–52.5)
HEMOGLOBIN: 10.8 G/DL (ref 13.5–17.5)
HEMOGLOBIN: 13.3 G/DL (ref 13.5–17.5)
INR BLD: 1.03 (ref 0.86–1.14)
KETONES, URINE: 40 MG/DL
LEUKOCYTE ESTERASE, URINE: ABNORMAL
LYMPHOCYTES ABSOLUTE: 0.7 K/UL (ref 1–5.1)
LYMPHOCYTES RELATIVE PERCENT: 4.7 %
MCH RBC QN AUTO: 31.7 PG (ref 26–34)
MCHC RBC AUTO-ENTMCNC: 32.2 G/DL (ref 31–36)
MCV RBC AUTO: 98.6 FL (ref 80–100)
MICROSCOPIC EXAMINATION: YES
MONOCYTES ABSOLUTE: 0.8 K/UL (ref 0–1.3)
MONOCYTES RELATIVE PERCENT: 5.6 %
NEUTROPHILS ABSOLUTE: 12.5 K/UL (ref 1.7–7.7)
NEUTROPHILS RELATIVE PERCENT: 89 %
NITRITE, URINE: POSITIVE
PDW BLD-RTO: 15.7 % (ref 12.4–15.4)
PH UA: 7.5 (ref 5–8)
PLATELET # BLD: 160 K/UL (ref 135–450)
PMV BLD AUTO: 10.2 FL (ref 5–10.5)
POTASSIUM SERPL-SCNC: 6 MMOL/L (ref 3.5–5.1)
POTASSIUM SERPL-SCNC: 6.2 MMOL/L (ref 3.5–5.1)
POTASSIUM SERPL-SCNC: 6.8 MMOL/L (ref 3.5–5.1)
POTASSIUM SERPL-SCNC: 6.8 MMOL/L (ref 3.5–5.1)
PROTEIN UA: >=300 MG/DL
PROTHROMBIN TIME: 12 SEC (ref 10–13.2)
RBC # BLD: 4.19 M/UL (ref 4.2–5.9)
RBC UA: >100 /HPF (ref 0–2)
SODIUM BLD-SCNC: 136 MMOL/L (ref 136–145)
SPECIFIC GRAVITY UA: 1.01 (ref 1–1.03)
URINE TYPE: ABNORMAL
UROBILINOGEN, URINE: >=8 E.U./DL
WBC # BLD: 14 K/UL (ref 4–11)
WBC UA: ABNORMAL /HPF (ref 0–5)

## 2019-12-17 PROCEDURE — 51798 US URINE CAPACITY MEASURE: CPT

## 2019-12-17 PROCEDURE — 85018 HEMOGLOBIN: CPT

## 2019-12-17 PROCEDURE — 81001 URINALYSIS AUTO W/SCOPE: CPT

## 2019-12-17 PROCEDURE — 1200000000 HC SEMI PRIVATE

## 2019-12-17 PROCEDURE — 96374 THER/PROPH/DIAG INJ IV PUSH: CPT

## 2019-12-17 PROCEDURE — 6370000000 HC RX 637 (ALT 250 FOR IP): Performed by: INTERNAL MEDICINE

## 2019-12-17 PROCEDURE — 2580000003 HC RX 258: Performed by: INTERNAL MEDICINE

## 2019-12-17 PROCEDURE — 2580000003 HC RX 258: Performed by: HOSPITALIST

## 2019-12-17 PROCEDURE — 80048 BASIC METABOLIC PNL TOTAL CA: CPT

## 2019-12-17 PROCEDURE — 6360000002 HC RX W HCPCS: Performed by: EMERGENCY MEDICINE

## 2019-12-17 PROCEDURE — 6360000002 HC RX W HCPCS: Performed by: HOSPITALIST

## 2019-12-17 PROCEDURE — 86901 BLOOD TYPING SEROLOGIC RH(D): CPT

## 2019-12-17 PROCEDURE — 85014 HEMATOCRIT: CPT

## 2019-12-17 PROCEDURE — 86900 BLOOD TYPING SEROLOGIC ABO: CPT

## 2019-12-17 PROCEDURE — 6360000002 HC RX W HCPCS: Performed by: INTERNAL MEDICINE

## 2019-12-17 PROCEDURE — 85025 COMPLETE CBC W/AUTO DIFF WBC: CPT

## 2019-12-17 PROCEDURE — 36415 COLL VENOUS BLD VENIPUNCTURE: CPT

## 2019-12-17 PROCEDURE — 96375 TX/PRO/DX INJ NEW DRUG ADDON: CPT

## 2019-12-17 PROCEDURE — 86850 RBC ANTIBODY SCREEN: CPT

## 2019-12-17 PROCEDURE — 84132 ASSAY OF SERUM POTASSIUM: CPT

## 2019-12-17 PROCEDURE — 85610 PROTHROMBIN TIME: CPT

## 2019-12-17 PROCEDURE — 99284 EMERGENCY DEPT VISIT MOD MDM: CPT

## 2019-12-17 PROCEDURE — 87040 BLOOD CULTURE FOR BACTERIA: CPT

## 2019-12-17 PROCEDURE — 85730 THROMBOPLASTIN TIME PARTIAL: CPT

## 2019-12-17 PROCEDURE — 51702 INSERT TEMP BLADDER CATH: CPT

## 2019-12-17 RX ORDER — ONDANSETRON 2 MG/ML
4 INJECTION INTRAMUSCULAR; INTRAVENOUS ONCE
Status: COMPLETED | OUTPATIENT
Start: 2019-12-17 | End: 2019-12-17

## 2019-12-17 RX ORDER — FERROUS SULFATE 325(65) MG
325 TABLET ORAL
Status: DISCONTINUED | OUTPATIENT
Start: 2019-12-18 | End: 2019-12-20 | Stop reason: HOSPADM

## 2019-12-17 RX ORDER — 0.9 % SODIUM CHLORIDE 0.9 %
500 INTRAVENOUS SOLUTION INTRAVENOUS ONCE
Status: COMPLETED | OUTPATIENT
Start: 2019-12-17 | End: 2019-12-18

## 2019-12-17 RX ORDER — SODIUM CHLORIDE 9 MG/ML
INJECTION, SOLUTION INTRAVENOUS CONTINUOUS
Status: DISCONTINUED | OUTPATIENT
Start: 2019-12-17 | End: 2019-12-19

## 2019-12-17 RX ORDER — MORPHINE SULFATE 4 MG/ML
4 INJECTION, SOLUTION INTRAMUSCULAR; INTRAVENOUS ONCE
Status: COMPLETED | OUTPATIENT
Start: 2019-12-17 | End: 2019-12-17

## 2019-12-17 RX ORDER — ATORVASTATIN CALCIUM 40 MG/1
80 TABLET, FILM COATED ORAL NIGHTLY
Status: DISCONTINUED | OUTPATIENT
Start: 2019-12-17 | End: 2019-12-20 | Stop reason: HOSPADM

## 2019-12-17 RX ORDER — METOPROLOL SUCCINATE 25 MG/1
25 TABLET, EXTENDED RELEASE ORAL DAILY
Status: DISCONTINUED | OUTPATIENT
Start: 2019-12-17 | End: 2019-12-20 | Stop reason: HOSPADM

## 2019-12-17 RX ORDER — UREA 10 %
10 LOTION (ML) TOPICAL NIGHTLY
Status: DISCONTINUED | OUTPATIENT
Start: 2019-12-17 | End: 2019-12-20 | Stop reason: HOSPADM

## 2019-12-17 RX ORDER — ZOLPIDEM TARTRATE 5 MG/1
10 TABLET ORAL NIGHTLY
Status: DISCONTINUED | OUTPATIENT
Start: 2019-12-17 | End: 2019-12-20 | Stop reason: HOSPADM

## 2019-12-17 RX ORDER — SODIUM CHLORIDE 0.9 % (FLUSH) 0.9 %
10 SYRINGE (ML) INJECTION EVERY 12 HOURS SCHEDULED
Status: DISCONTINUED | OUTPATIENT
Start: 2019-12-17 | End: 2019-12-20 | Stop reason: HOSPADM

## 2019-12-17 RX ORDER — FUROSEMIDE 10 MG/ML
40 INJECTION INTRAMUSCULAR; INTRAVENOUS ONCE
Status: COMPLETED | OUTPATIENT
Start: 2019-12-17 | End: 2019-12-17

## 2019-12-17 RX ORDER — GABAPENTIN 600 MG/1
1200 TABLET ORAL
Status: DISCONTINUED | OUTPATIENT
Start: 2019-12-17 | End: 2019-12-17 | Stop reason: DRUGHIGH

## 2019-12-17 RX ORDER — GABAPENTIN 600 MG/1
600 TABLET ORAL DAILY
Status: DISCONTINUED | OUTPATIENT
Start: 2019-12-18 | End: 2019-12-20 | Stop reason: HOSPADM

## 2019-12-17 RX ORDER — METOPROLOL SUCCINATE 25 MG/1
25 TABLET, EXTENDED RELEASE ORAL DAILY
Status: ON HOLD | COMMUNITY
End: 2020-01-20 | Stop reason: HOSPADM

## 2019-12-17 RX ORDER — GABAPENTIN 400 MG/1
800 CAPSULE ORAL NIGHTLY
Status: DISCONTINUED | OUTPATIENT
Start: 2019-12-17 | End: 2019-12-20 | Stop reason: HOSPADM

## 2019-12-17 RX ORDER — GABAPENTIN 600 MG/1
1800 TABLET ORAL NIGHTLY
Status: DISCONTINUED | OUTPATIENT
Start: 2019-12-17 | End: 2019-12-17 | Stop reason: DRUGHIGH

## 2019-12-17 RX ORDER — RANITIDINE 300 MG/1
300 TABLET ORAL NIGHTLY
Status: ON HOLD | COMMUNITY
End: 2020-05-05 | Stop reason: HOSPADM

## 2019-12-17 RX ORDER — TRIAMCINOLONE ACETONIDE 5 MG/G
CREAM TOPICAL 2 TIMES DAILY PRN
COMMUNITY
End: 2020-01-14

## 2019-12-17 RX ORDER — SPIRONOLACTONE 25 MG/1
25 TABLET ORAL 2 TIMES DAILY WITH MEALS
Status: ON HOLD | COMMUNITY
End: 2019-12-20 | Stop reason: HOSPADM

## 2019-12-17 RX ORDER — 0.9 % SODIUM CHLORIDE 0.9 %
1000 INTRAVENOUS SOLUTION INTRAVENOUS ONCE
Status: COMPLETED | OUTPATIENT
Start: 2019-12-17 | End: 2019-12-17

## 2019-12-17 RX ORDER — SODIUM CHLORIDE 0.9 % (FLUSH) 0.9 %
10 SYRINGE (ML) INJECTION PRN
Status: DISCONTINUED | OUTPATIENT
Start: 2019-12-17 | End: 2019-12-20 | Stop reason: HOSPADM

## 2019-12-17 RX ORDER — DEXTROSE MONOHYDRATE 25 G/50ML
50 INJECTION, SOLUTION INTRAVENOUS ONCE
Status: COMPLETED | OUTPATIENT
Start: 2019-12-17 | End: 2019-12-17

## 2019-12-17 RX ORDER — ONDANSETRON 2 MG/ML
4 INJECTION INTRAMUSCULAR; INTRAVENOUS EVERY 6 HOURS PRN
Status: DISCONTINUED | OUTPATIENT
Start: 2019-12-17 | End: 2019-12-20 | Stop reason: HOSPADM

## 2019-12-17 RX ADMIN — CALCIUM GLUCONATE 1 G: 98 INJECTION, SOLUTION INTRAVENOUS at 11:20

## 2019-12-17 RX ADMIN — CEFTRIAXONE 1 G: 1 INJECTION, POWDER, FOR SOLUTION INTRAMUSCULAR; INTRAVENOUS at 16:23

## 2019-12-17 RX ADMIN — SODIUM CHLORIDE 500 ML: 9 INJECTION, SOLUTION INTRAVENOUS at 22:35

## 2019-12-17 RX ADMIN — INSULIN HUMAN 10 UNITS: 100 INJECTION, SOLUTION PARENTERAL at 11:20

## 2019-12-17 RX ADMIN — DEXTROSE MONOHYDRATE 50 ML: 500 INJECTION PARENTERAL at 11:20

## 2019-12-17 RX ADMIN — ONDANSETRON 4 MG: 2 INJECTION INTRAMUSCULAR; INTRAVENOUS at 09:58

## 2019-12-17 RX ADMIN — FUROSEMIDE 40 MG: 10 INJECTION, SOLUTION INTRAMUSCULAR; INTRAVENOUS at 16:24

## 2019-12-17 RX ADMIN — SODIUM CHLORIDE: 9 INJECTION, SOLUTION INTRAVENOUS at 16:23

## 2019-12-17 RX ADMIN — SODIUM CHLORIDE 1000 ML: 9 INJECTION, SOLUTION INTRAVENOUS at 16:23

## 2019-12-17 RX ADMIN — METOPROLOL SUCCINATE 25 MG: 25 TABLET, EXTENDED RELEASE ORAL at 16:24

## 2019-12-17 RX ADMIN — MORPHINE SULFATE 4 MG: 4 INJECTION INTRAVENOUS at 09:57

## 2019-12-17 ASSESSMENT — PAIN DESCRIPTION - PAIN TYPE: TYPE: ACUTE PAIN;CHRONIC PAIN

## 2019-12-17 ASSESSMENT — PAIN DESCRIPTION - PROGRESSION: CLINICAL_PROGRESSION: NOT CHANGED

## 2019-12-17 ASSESSMENT — PAIN SCALES - GENERAL
PAINLEVEL_OUTOF10: 7
PAINLEVEL_OUTOF10: 0
PAINLEVEL_OUTOF10: 7
PAINLEVEL_OUTOF10: 0

## 2019-12-17 ASSESSMENT — ENCOUNTER SYMPTOMS
CONSTIPATION: 0
SHORTNESS OF BREATH: 0
NAUSEA: 0
DIARRHEA: 0
EYES NEGATIVE: 1
VOMITING: 0
COUGH: 0
ABDOMINAL PAIN: 0

## 2019-12-17 ASSESSMENT — PAIN DESCRIPTION - FREQUENCY: FREQUENCY: INTERMITTENT

## 2019-12-17 ASSESSMENT — PAIN DESCRIPTION - LOCATION: LOCATION: PENIS

## 2019-12-17 ASSESSMENT — PAIN DESCRIPTION - ONSET: ONSET: ON-GOING

## 2019-12-18 LAB
ANION GAP SERPL CALCULATED.3IONS-SCNC: 8 MMOL/L (ref 3–16)
BASOPHILS ABSOLUTE: 0.1 K/UL (ref 0–0.2)
BASOPHILS RELATIVE PERCENT: 0.6 %
BUN BLDV-MCNC: 55 MG/DL (ref 7–20)
CALCIUM SERPL-MCNC: 8.6 MG/DL (ref 8.3–10.6)
CHLORIDE BLD-SCNC: 112 MMOL/L (ref 99–110)
CO2: 20 MMOL/L (ref 21–32)
CREAT SERPL-MCNC: 1.5 MG/DL (ref 0.8–1.3)
EOSINOPHILS ABSOLUTE: 0.5 K/UL (ref 0–0.6)
EOSINOPHILS RELATIVE PERCENT: 6.3 %
GFR AFRICAN AMERICAN: 55
GFR NON-AFRICAN AMERICAN: 45
GLUCOSE BLD-MCNC: 89 MG/DL (ref 70–99)
HCT VFR BLD CALC: 30.7 % (ref 40.5–52.5)
HCT VFR BLD CALC: 31.7 % (ref 40.5–52.5)
HEMOGLOBIN: 10.1 G/DL (ref 13.5–17.5)
HEMOGLOBIN: 10.3 G/DL (ref 13.5–17.5)
LYMPHOCYTES ABSOLUTE: 1.4 K/UL (ref 1–5.1)
LYMPHOCYTES RELATIVE PERCENT: 15.6 %
MAGNESIUM: 2 MG/DL (ref 1.8–2.4)
MCH RBC QN AUTO: 32.3 PG (ref 26–34)
MCHC RBC AUTO-ENTMCNC: 32.5 G/DL (ref 31–36)
MCV RBC AUTO: 99.4 FL (ref 80–100)
MONOCYTES ABSOLUTE: 0.6 K/UL (ref 0–1.3)
MONOCYTES RELATIVE PERCENT: 7.3 %
NEUTROPHILS ABSOLUTE: 6.1 K/UL (ref 1.7–7.7)
NEUTROPHILS RELATIVE PERCENT: 70.2 %
PDW BLD-RTO: 16.1 % (ref 12.4–15.4)
PLATELET # BLD: 109 K/UL (ref 135–450)
PMV BLD AUTO: 10 FL (ref 5–10.5)
POTASSIUM REFLEX MAGNESIUM: 6 MMOL/L (ref 3.5–5.1)
RBC # BLD: 3.19 M/UL (ref 4.2–5.9)
SODIUM BLD-SCNC: 140 MMOL/L (ref 136–145)
WBC # BLD: 8.7 K/UL (ref 4–11)

## 2019-12-18 PROCEDURE — 6370000000 HC RX 637 (ALT 250 FOR IP): Performed by: INTERNAL MEDICINE

## 2019-12-18 PROCEDURE — 85014 HEMATOCRIT: CPT

## 2019-12-18 PROCEDURE — 6370000000 HC RX 637 (ALT 250 FOR IP): Performed by: HOSPITALIST

## 2019-12-18 PROCEDURE — 1200000000 HC SEMI PRIVATE

## 2019-12-18 PROCEDURE — 85025 COMPLETE CBC W/AUTO DIFF WBC: CPT

## 2019-12-18 PROCEDURE — 83735 ASSAY OF MAGNESIUM: CPT

## 2019-12-18 PROCEDURE — 80048 BASIC METABOLIC PNL TOTAL CA: CPT

## 2019-12-18 PROCEDURE — 2580000003 HC RX 258: Performed by: HOSPITALIST

## 2019-12-18 PROCEDURE — 2580000003 HC RX 258: Performed by: INTERNAL MEDICINE

## 2019-12-18 PROCEDURE — 85018 HEMOGLOBIN: CPT

## 2019-12-18 PROCEDURE — 36415 COLL VENOUS BLD VENIPUNCTURE: CPT

## 2019-12-18 RX ORDER — CASTOR OIL AND BALSAM, PERU 788; 87 MG/G; MG/G
OINTMENT TOPICAL 2 TIMES DAILY
Status: DISCONTINUED | OUTPATIENT
Start: 2019-12-18 | End: 2019-12-20 | Stop reason: HOSPADM

## 2019-12-18 RX ORDER — 0.9 % SODIUM CHLORIDE 0.9 %
500 INTRAVENOUS SOLUTION INTRAVENOUS ONCE
Status: COMPLETED | OUTPATIENT
Start: 2019-12-18 | End: 2019-12-18

## 2019-12-18 RX ADMIN — SODIUM ZIRCONIUM CYCLOSILICATE 10 G: 10 POWDER, FOR SUSPENSION ORAL at 00:28

## 2019-12-18 RX ADMIN — SODIUM ZIRCONIUM CYCLOSILICATE 10 G: 10 POWDER, FOR SUSPENSION ORAL at 12:13

## 2019-12-18 RX ADMIN — CASTOR OIL AND BALSAM, PERU: 788; 87 OINTMENT TOPICAL at 17:55

## 2019-12-18 RX ADMIN — SODIUM CHLORIDE 500 ML: 9 INJECTION, SOLUTION INTRAVENOUS at 03:27

## 2019-12-18 RX ADMIN — SODIUM CHLORIDE: 9 INJECTION, SOLUTION INTRAVENOUS at 22:36

## 2019-12-18 RX ADMIN — ATORVASTATIN CALCIUM 80 MG: 40 TABLET, FILM COATED ORAL at 21:10

## 2019-12-18 RX ADMIN — GABAPENTIN 800 MG: 400 CAPSULE ORAL at 00:06

## 2019-12-18 RX ADMIN — GABAPENTIN 600 MG: 600 TABLET, FILM COATED ORAL at 09:37

## 2019-12-18 RX ADMIN — ATORVASTATIN CALCIUM 80 MG: 40 TABLET, FILM COATED ORAL at 00:06

## 2019-12-18 RX ADMIN — ZOLPIDEM TARTRATE 10 MG: 5 TABLET ORAL at 00:22

## 2019-12-18 RX ADMIN — FERROUS SULFATE TAB 325 MG (65 MG ELEMENTAL FE) 325 MG: 325 (65 FE) TAB at 09:38

## 2019-12-18 RX ADMIN — GABAPENTIN 800 MG: 400 CAPSULE ORAL at 21:10

## 2019-12-18 RX ADMIN — Medication 10 MG: at 00:05

## 2019-12-18 RX ADMIN — CASTOR OIL AND BALSAM, PERU: 788; 87 OINTMENT TOPICAL at 21:10

## 2019-12-18 ASSESSMENT — PAIN SCALES - GENERAL
PAINLEVEL_OUTOF10: 0

## 2019-12-19 LAB
ALBUMIN SERPL-MCNC: 3.2 G/DL (ref 3.4–5)
ANION GAP SERPL CALCULATED.3IONS-SCNC: 10 MMOL/L (ref 3–16)
BUN BLDV-MCNC: 39 MG/DL (ref 7–20)
CALCIUM SERPL-MCNC: 8.4 MG/DL (ref 8.3–10.6)
CHLORIDE BLD-SCNC: 112 MMOL/L (ref 99–110)
CO2: 18 MMOL/L (ref 21–32)
CREAT SERPL-MCNC: 1.2 MG/DL (ref 0.8–1.3)
GFR AFRICAN AMERICAN: >60
GFR NON-AFRICAN AMERICAN: 59
GLUCOSE BLD-MCNC: 145 MG/DL (ref 70–99)
HCT VFR BLD CALC: 30.7 % (ref 40.5–52.5)
HEMOGLOBIN: 10 G/DL (ref 13.5–17.5)
MCH RBC QN AUTO: 32.8 PG (ref 26–34)
MCHC RBC AUTO-ENTMCNC: 32.7 G/DL (ref 31–36)
MCV RBC AUTO: 100.5 FL (ref 80–100)
PDW BLD-RTO: 16.4 % (ref 12.4–15.4)
PHOSPHORUS: 3.3 MG/DL (ref 2.5–4.9)
PLATELET # BLD: 107 K/UL (ref 135–450)
PMV BLD AUTO: 10 FL (ref 5–10.5)
POTASSIUM SERPL-SCNC: 5.6 MMOL/L (ref 3.5–5.1)
RBC # BLD: 3.06 M/UL (ref 4.2–5.9)
SODIUM BLD-SCNC: 140 MMOL/L (ref 136–145)
WBC # BLD: 8.4 K/UL (ref 4–11)

## 2019-12-19 PROCEDURE — 2580000003 HC RX 258: Performed by: INTERNAL MEDICINE

## 2019-12-19 PROCEDURE — 80069 RENAL FUNCTION PANEL: CPT

## 2019-12-19 PROCEDURE — 1200000000 HC SEMI PRIVATE

## 2019-12-19 PROCEDURE — 6370000000 HC RX 637 (ALT 250 FOR IP): Performed by: INTERNAL MEDICINE

## 2019-12-19 PROCEDURE — 85027 COMPLETE CBC AUTOMATED: CPT

## 2019-12-19 PROCEDURE — 36415 COLL VENOUS BLD VENIPUNCTURE: CPT

## 2019-12-19 RX ADMIN — GABAPENTIN 600 MG: 600 TABLET, FILM COATED ORAL at 08:47

## 2019-12-19 RX ADMIN — Medication 10 MG: at 00:14

## 2019-12-19 RX ADMIN — ATORVASTATIN CALCIUM 80 MG: 40 TABLET, FILM COATED ORAL at 22:31

## 2019-12-19 RX ADMIN — CASTOR OIL AND BALSAM, PERU: 788; 87 OINTMENT TOPICAL at 08:47

## 2019-12-19 RX ADMIN — GABAPENTIN 800 MG: 400 CAPSULE ORAL at 22:30

## 2019-12-19 RX ADMIN — METOPROLOL SUCCINATE 25 MG: 25 TABLET, EXTENDED RELEASE ORAL at 08:47

## 2019-12-19 RX ADMIN — CASTOR OIL AND BALSAM, PERU: 788; 87 OINTMENT TOPICAL at 22:48

## 2019-12-19 RX ADMIN — Medication 10 ML: at 22:31

## 2019-12-19 RX ADMIN — ZOLPIDEM TARTRATE 10 MG: 5 TABLET ORAL at 00:14

## 2019-12-19 RX ADMIN — FERROUS SULFATE TAB 325 MG (65 MG ELEMENTAL FE) 325 MG: 325 (65 FE) TAB at 08:47

## 2019-12-19 ASSESSMENT — PAIN SCALES - GENERAL
PAINLEVEL_OUTOF10: 0

## 2019-12-20 VITALS
BODY MASS INDEX: 26.39 KG/M2 | SYSTOLIC BLOOD PRESSURE: 131 MMHG | OXYGEN SATURATION: 96 % | HEIGHT: 71 IN | WEIGHT: 188.49 LBS | TEMPERATURE: 98.4 F | RESPIRATION RATE: 16 BRPM | HEART RATE: 62 BPM | DIASTOLIC BLOOD PRESSURE: 68 MMHG

## 2019-12-20 LAB
ALBUMIN SERPL-MCNC: 2.8 G/DL (ref 3.4–5)
ANION GAP SERPL CALCULATED.3IONS-SCNC: 10 MMOL/L (ref 3–16)
BUN BLDV-MCNC: 38 MG/DL (ref 7–20)
CALCIUM SERPL-MCNC: 8.2 MG/DL (ref 8.3–10.6)
CHLORIDE BLD-SCNC: 109 MMOL/L (ref 99–110)
CO2: 20 MMOL/L (ref 21–32)
CREAT SERPL-MCNC: 1.2 MG/DL (ref 0.8–1.3)
GFR AFRICAN AMERICAN: >60
GFR NON-AFRICAN AMERICAN: 59
GLUCOSE BLD-MCNC: 93 MG/DL (ref 70–99)
HCT VFR BLD CALC: 27.9 % (ref 40.5–52.5)
HEMOGLOBIN: 9.2 G/DL (ref 13.5–17.5)
MCH RBC QN AUTO: 32.9 PG (ref 26–34)
MCHC RBC AUTO-ENTMCNC: 32.9 G/DL (ref 31–36)
MCV RBC AUTO: 100 FL (ref 80–100)
PDW BLD-RTO: 15.7 % (ref 12.4–15.4)
PHOSPHORUS: 3.7 MG/DL (ref 2.5–4.9)
PLATELET # BLD: 89 K/UL (ref 135–450)
PLATELET SLIDE REVIEW: ABNORMAL
PMV BLD AUTO: 11 FL (ref 5–10.5)
POTASSIUM SERPL-SCNC: 5.2 MMOL/L (ref 3.5–5.1)
RBC # BLD: 2.79 M/UL (ref 4.2–5.9)
SODIUM BLD-SCNC: 139 MMOL/L (ref 136–145)
WBC # BLD: 7.6 K/UL (ref 4–11)

## 2019-12-20 PROCEDURE — 6370000000 HC RX 637 (ALT 250 FOR IP): Performed by: INTERNAL MEDICINE

## 2019-12-20 PROCEDURE — 80069 RENAL FUNCTION PANEL: CPT

## 2019-12-20 PROCEDURE — 2580000003 HC RX 258: Performed by: INTERNAL MEDICINE

## 2019-12-20 PROCEDURE — 36415 COLL VENOUS BLD VENIPUNCTURE: CPT

## 2019-12-20 PROCEDURE — 85027 COMPLETE CBC AUTOMATED: CPT

## 2019-12-20 RX ADMIN — CASTOR OIL AND BALSAM, PERU: 788; 87 OINTMENT TOPICAL at 09:36

## 2019-12-20 RX ADMIN — GABAPENTIN 600 MG: 600 TABLET, FILM COATED ORAL at 09:35

## 2019-12-20 RX ADMIN — ZOLPIDEM TARTRATE 10 MG: 5 TABLET ORAL at 00:16

## 2019-12-20 RX ADMIN — Medication 10 ML: at 09:36

## 2019-12-20 RX ADMIN — ATORVASTATIN CALCIUM 80 MG: 40 TABLET, FILM COATED ORAL at 20:32

## 2019-12-20 RX ADMIN — FERROUS SULFATE TAB 325 MG (65 MG ELEMENTAL FE) 325 MG: 325 (65 FE) TAB at 09:35

## 2019-12-20 RX ADMIN — Medication 10 MG: at 00:16

## 2019-12-20 RX ADMIN — METOPROLOL SUCCINATE 25 MG: 25 TABLET, EXTENDED RELEASE ORAL at 09:35

## 2019-12-20 ASSESSMENT — PAIN SCALES - GENERAL: PAINLEVEL_OUTOF10: 0

## 2019-12-21 LAB
BLOOD CULTURE, ROUTINE: NORMAL
CULTURE, BLOOD 2: NORMAL

## 2019-12-22 PROBLEM — N39.0 UTI (URINARY TRACT INFECTION): Status: RESOLVED | Noted: 2019-11-19 | Resolved: 2019-12-22

## 2019-12-27 ENCOUNTER — HOSPITAL ENCOUNTER (EMERGENCY)
Age: 79
Discharge: HOME | End: 2019-12-27
Attending: EMERGENCY MEDICINE
Payer: MEDICARE

## 2019-12-27 VITALS
HEART RATE: 62 BPM | SYSTOLIC BLOOD PRESSURE: 150 MMHG | OXYGEN SATURATION: 99 % | RESPIRATION RATE: 16 BRPM | DIASTOLIC BLOOD PRESSURE: 71 MMHG | TEMPERATURE: 98 F

## 2019-12-27 DIAGNOSIS — S90.829A BLISTER OF FOOT, UNSPECIFIED LATERALITY, INITIAL ENCOUNTER: Primary | ICD-10-CM

## 2019-12-27 PROCEDURE — 99283 EMERGENCY DEPT VISIT LOW MDM: CPT

## 2019-12-27 ASSESSMENT — ENCOUNTER SYMPTOMS
CONSTIPATION: 0
SHORTNESS OF BREATH: 0
DIARRHEA: 0
APNEA: 0
ABDOMINAL PAIN: 0
BACK PAIN: 0
COUGH: 0
VOMITING: 0
NAUSEA: 0

## 2019-12-30 ENCOUNTER — TELEPHONE (OUTPATIENT)
Dept: WOUND CARE | Age: 79
End: 2019-12-30

## 2020-01-13 ENCOUNTER — HOSPITAL ENCOUNTER (INPATIENT)
Age: 80
LOS: 7 days | Discharge: HOME HEALTH | DRG: 309 | End: 2020-01-20
Attending: EMERGENCY MEDICINE | Admitting: INTERNAL MEDICINE
Payer: MEDICARE

## 2020-01-13 ENCOUNTER — APPOINTMENT (OUTPATIENT)
Dept: GENERAL RADIOLOGY | Age: 80
DRG: 309 | End: 2020-01-13
Payer: MEDICARE

## 2020-01-13 PROBLEM — I50.9 CHF WITH UNKNOWN LVEF (HCC): Status: ACTIVE | Noted: 2020-01-13

## 2020-01-13 LAB
AMORPHOUS: ABNORMAL /HPF
ANION GAP SERPL CALCULATED.3IONS-SCNC: 9 MMOL/L (ref 3–16)
BACTERIA: ABNORMAL /HPF
BASE EXCESS VENOUS: -0.6 MMOL/L (ref -2–3)
BASOPHILS ABSOLUTE: 0 K/UL (ref 0–0.2)
BASOPHILS RELATIVE PERCENT: 0.4 %
BILIRUBIN URINE: NEGATIVE
BLOOD, URINE: ABNORMAL
BUN BLDV-MCNC: 36 MG/DL (ref 7–20)
CALCIUM SERPL-MCNC: 9.6 MG/DL (ref 8.3–10.6)
CARBOXYHEMOGLOBIN: 1.4 % (ref 0–1.5)
CHLORIDE BLD-SCNC: 108 MMOL/L (ref 99–110)
CLARITY: CLEAR
CO2: 23 MMOL/L (ref 21–32)
COLOR: YELLOW
CREAT SERPL-MCNC: 1 MG/DL (ref 0.8–1.3)
EOSINOPHILS ABSOLUTE: 0.1 K/UL (ref 0–0.6)
EOSINOPHILS RELATIVE PERCENT: 1.5 %
EPITHELIAL CELLS, UA: ABNORMAL /HPF
GFR AFRICAN AMERICAN: >60
GFR NON-AFRICAN AMERICAN: >60
GLUCOSE BLD-MCNC: 140 MG/DL (ref 70–99)
GLUCOSE URINE: NEGATIVE MG/DL
HCO3 VENOUS: 26.9 MMOL/L (ref 24–28)
HCT VFR BLD CALC: 41.2 % (ref 40.5–52.5)
HEMOGLOBIN, VEN, REDUCED: 47.5 %
HEMOGLOBIN: 13.2 G/DL (ref 13.5–17.5)
KETONES, URINE: NEGATIVE MG/DL
LEUKOCYTE ESTERASE, URINE: ABNORMAL
LYMPHOCYTES ABSOLUTE: 0.9 K/UL (ref 1–5.1)
LYMPHOCYTES RELATIVE PERCENT: 13.7 %
MCH RBC QN AUTO: 32.6 PG (ref 26–34)
MCHC RBC AUTO-ENTMCNC: 32.1 G/DL (ref 31–36)
MCV RBC AUTO: 101.6 FL (ref 80–100)
METHEMOGLOBIN VENOUS: 0.4 % (ref 0–1.5)
MICROSCOPIC EXAMINATION: YES
MONOCYTES ABSOLUTE: 0.3 K/UL (ref 0–1.3)
MONOCYTES RELATIVE PERCENT: 5.3 %
NEUTROPHILS ABSOLUTE: 5.2 K/UL (ref 1.7–7.7)
NEUTROPHILS RELATIVE PERCENT: 79.1 %
NITRITE, URINE: POSITIVE
O2 SAT, VEN: 52 %
PCO2, VEN: 59.9 MMHG (ref 41–51)
PDW BLD-RTO: 16.1 % (ref 12.4–15.4)
PH UA: 6 (ref 5–8)
PH VENOUS: 7.28 (ref 7.35–7.45)
PLATELET # BLD: 140 K/UL (ref 135–450)
PMV BLD AUTO: 9.8 FL (ref 5–10.5)
PO2, VEN: 31.1 MMHG (ref 25–40)
POTASSIUM REFLEX MAGNESIUM: 5.5 MMOL/L (ref 3.5–5.1)
PRO-BNP: 398 PG/ML (ref 0–449)
PROTEIN UA: 30 MG/DL
RBC # BLD: 4.06 M/UL (ref 4.2–5.9)
RBC UA: ABNORMAL /HPF (ref 0–2)
SODIUM BLD-SCNC: 140 MMOL/L (ref 136–145)
SPECIFIC GRAVITY UA: >=1.03 (ref 1–1.03)
TCO2 CALC VENOUS: 29 MMOL/L
TROPONIN: 0.03 NG/ML
URINE TYPE: ABNORMAL
UROBILINOGEN, URINE: 1 E.U./DL
WBC # BLD: 6.5 K/UL (ref 4–11)
WBC UA: ABNORMAL /HPF (ref 0–5)

## 2020-01-13 PROCEDURE — 83880 ASSAY OF NATRIURETIC PEPTIDE: CPT

## 2020-01-13 PROCEDURE — 84484 ASSAY OF TROPONIN QUANT: CPT

## 2020-01-13 PROCEDURE — 81001 URINALYSIS AUTO W/SCOPE: CPT

## 2020-01-13 PROCEDURE — 2580000003 HC RX 258: Performed by: STUDENT IN AN ORGANIZED HEALTH CARE EDUCATION/TRAINING PROGRAM

## 2020-01-13 PROCEDURE — 1200000000 HC SEMI PRIVATE

## 2020-01-13 PROCEDURE — 71046 X-RAY EXAM CHEST 2 VIEWS: CPT

## 2020-01-13 PROCEDURE — 82803 BLOOD GASES ANY COMBINATION: CPT

## 2020-01-13 PROCEDURE — 80048 BASIC METABOLIC PNL TOTAL CA: CPT

## 2020-01-13 PROCEDURE — 93005 ELECTROCARDIOGRAM TRACING: CPT | Performed by: STUDENT IN AN ORGANIZED HEALTH CARE EDUCATION/TRAINING PROGRAM

## 2020-01-13 PROCEDURE — 87086 URINE CULTURE/COLONY COUNT: CPT

## 2020-01-13 PROCEDURE — 99285 EMERGENCY DEPT VISIT HI MDM: CPT

## 2020-01-13 PROCEDURE — 96365 THER/PROPH/DIAG IV INF INIT: CPT

## 2020-01-13 PROCEDURE — 85025 COMPLETE CBC W/AUTO DIFF WBC: CPT

## 2020-01-13 PROCEDURE — 6360000002 HC RX W HCPCS: Performed by: STUDENT IN AN ORGANIZED HEALTH CARE EDUCATION/TRAINING PROGRAM

## 2020-01-13 RX ADMIN — CEFEPIME HYDROCHLORIDE 1 G: 1 INJECTION, POWDER, FOR SOLUTION INTRAMUSCULAR; INTRAVENOUS at 23:56

## 2020-01-13 ASSESSMENT — ENCOUNTER SYMPTOMS
VOMITING: 0
DIARRHEA: 0
COUGH: 0
RHINORRHEA: 0
EYE REDNESS: 0
SHORTNESS OF BREATH: 1
EYE PAIN: 0

## 2020-01-14 ENCOUNTER — APPOINTMENT (OUTPATIENT)
Dept: GENERAL RADIOLOGY | Age: 80
DRG: 309 | End: 2020-01-14
Payer: MEDICARE

## 2020-01-14 LAB
ALBUMIN SERPL-MCNC: 3.6 G/DL (ref 3.4–5)
ANION GAP SERPL CALCULATED.3IONS-SCNC: 11 MMOL/L (ref 3–16)
ANION GAP SERPL CALCULATED.3IONS-SCNC: 17 MMOL/L (ref 3–16)
BASOPHILS ABSOLUTE: 0 K/UL (ref 0–0.2)
BASOPHILS RELATIVE PERCENT: 0.6 %
BUN BLDV-MCNC: 33 MG/DL (ref 7–20)
BUN BLDV-MCNC: 36 MG/DL (ref 7–20)
CALCIUM SERPL-MCNC: 9.3 MG/DL (ref 8.3–10.6)
CALCIUM SERPL-MCNC: 9.6 MG/DL (ref 8.3–10.6)
CHLORIDE BLD-SCNC: 104 MMOL/L (ref 99–110)
CHLORIDE BLD-SCNC: 108 MMOL/L (ref 99–110)
CO2: 17 MMOL/L (ref 21–32)
CO2: 25 MMOL/L (ref 21–32)
CREAT SERPL-MCNC: 0.9 MG/DL (ref 0.8–1.3)
CREAT SERPL-MCNC: 1.1 MG/DL (ref 0.8–1.3)
CREATININE URINE: 148.9 MG/DL (ref 39–259)
EKG ATRIAL RATE: 48 BPM
EKG DIAGNOSIS: NORMAL
EKG Q-T INTERVAL: 450 MS
EKG QRS DURATION: 86 MS
EKG QTC CALCULATION (BAZETT): 406 MS
EKG R AXIS: 26 DEGREES
EKG T AXIS: 55 DEGREES
EKG VENTRICULAR RATE: 49 BPM
EOSINOPHILS ABSOLUTE: 0.2 K/UL (ref 0–0.6)
EOSINOPHILS RELATIVE PERCENT: 3.3 %
GFR AFRICAN AMERICAN: >60
GFR AFRICAN AMERICAN: >60
GFR NON-AFRICAN AMERICAN: >60
GFR NON-AFRICAN AMERICAN: >60
GLUCOSE BLD-MCNC: 91 MG/DL (ref 70–99)
GLUCOSE BLD-MCNC: 91 MG/DL (ref 70–99)
HCT VFR BLD CALC: 42.1 % (ref 40.5–52.5)
HEMOGLOBIN: 13 G/DL (ref 13.5–17.5)
LV EF: 58 %
LVEF MODALITY: NORMAL
LYMPHOCYTES ABSOLUTE: 1.1 K/UL (ref 1–5.1)
LYMPHOCYTES RELATIVE PERCENT: 19.3 %
MCH RBC QN AUTO: 32.2 PG (ref 26–34)
MCHC RBC AUTO-ENTMCNC: 30.9 G/DL (ref 31–36)
MCV RBC AUTO: 104 FL (ref 80–100)
MONOCYTES ABSOLUTE: 0.6 K/UL (ref 0–1.3)
MONOCYTES RELATIVE PERCENT: 9.5 %
NEUTROPHILS ABSOLUTE: 3.9 K/UL (ref 1.7–7.7)
NEUTROPHILS RELATIVE PERCENT: 67.3 %
PDW BLD-RTO: 16.4 % (ref 12.4–15.4)
PHOSPHORUS: 4.3 MG/DL (ref 2.5–4.9)
PLATELET # BLD: 133 K/UL (ref 135–450)
PMV BLD AUTO: 9.2 FL (ref 5–10.5)
POTASSIUM REFLEX MAGNESIUM: 5.4 MMOL/L (ref 3.5–5.1)
POTASSIUM SERPL-SCNC: 5 MMOL/L (ref 3.5–5.1)
PREALBUMIN: 14.7 MG/DL (ref 20–40)
PROTEIN PROTEIN: 55 MG/DL
PROTEIN/CREAT RATIO: 0.4 MG/DL
RBC # BLD: 4.05 M/UL (ref 4.2–5.9)
SODIUM BLD-SCNC: 140 MMOL/L (ref 136–145)
SODIUM BLD-SCNC: 142 MMOL/L (ref 136–145)
TROPONIN: 0.04 NG/ML
TROPONIN: 0.05 NG/ML
WBC # BLD: 5.8 K/UL (ref 4–11)

## 2020-01-14 PROCEDURE — 6370000000 HC RX 637 (ALT 250 FOR IP): Performed by: INTERNAL MEDICINE

## 2020-01-14 PROCEDURE — 85025 COMPLETE CBC W/AUTO DIFF WBC: CPT

## 2020-01-14 PROCEDURE — 1200000000 HC SEMI PRIVATE

## 2020-01-14 PROCEDURE — 6360000002 HC RX W HCPCS: Performed by: INTERNAL MEDICINE

## 2020-01-14 PROCEDURE — 96375 TX/PRO/DX INJ NEW DRUG ADDON: CPT

## 2020-01-14 PROCEDURE — 36415 COLL VENOUS BLD VENIPUNCTURE: CPT

## 2020-01-14 PROCEDURE — 2580000003 HC RX 258: Performed by: INTERNAL MEDICINE

## 2020-01-14 PROCEDURE — 93306 TTE W/DOPPLER COMPLETE: CPT

## 2020-01-14 PROCEDURE — 96372 THER/PROPH/DIAG INJ SC/IM: CPT

## 2020-01-14 PROCEDURE — 73630 X-RAY EXAM OF FOOT: CPT

## 2020-01-14 PROCEDURE — 84134 ASSAY OF PREALBUMIN: CPT

## 2020-01-14 PROCEDURE — 80069 RENAL FUNCTION PANEL: CPT

## 2020-01-14 PROCEDURE — 82570 ASSAY OF URINE CREATININE: CPT

## 2020-01-14 PROCEDURE — 84156 ASSAY OF PROTEIN URINE: CPT

## 2020-01-14 PROCEDURE — 84484 ASSAY OF TROPONIN QUANT: CPT

## 2020-01-14 RX ORDER — SODIUM CHLORIDE 0.9 % (FLUSH) 0.9 %
10 SYRINGE (ML) INJECTION PRN
Status: DISCONTINUED | OUTPATIENT
Start: 2020-01-14 | End: 2020-01-20 | Stop reason: HOSPADM

## 2020-01-14 RX ORDER — ATORVASTATIN CALCIUM 40 MG/1
80 TABLET, FILM COATED ORAL NIGHTLY
Status: DISCONTINUED | OUTPATIENT
Start: 2020-01-14 | End: 2020-01-20 | Stop reason: HOSPADM

## 2020-01-14 RX ORDER — FUROSEMIDE 10 MG/ML
40 INJECTION INTRAMUSCULAR; INTRAVENOUS 2 TIMES DAILY
Status: DISCONTINUED | OUTPATIENT
Start: 2020-01-14 | End: 2020-01-15

## 2020-01-14 RX ORDER — UREA 10 %
10 LOTION (ML) TOPICAL NIGHTLY PRN
Status: DISCONTINUED | OUTPATIENT
Start: 2020-01-14 | End: 2020-01-20 | Stop reason: HOSPADM

## 2020-01-14 RX ORDER — GABAPENTIN 600 MG/1
1800 TABLET ORAL NIGHTLY
Status: DISCONTINUED | OUTPATIENT
Start: 2020-01-14 | End: 2020-01-20 | Stop reason: HOSPADM

## 2020-01-14 RX ORDER — ZOLPIDEM TARTRATE 5 MG/1
10 TABLET ORAL NIGHTLY
Status: DISCONTINUED | OUTPATIENT
Start: 2020-01-14 | End: 2020-01-20 | Stop reason: HOSPADM

## 2020-01-14 RX ORDER — FAMOTIDINE 20 MG/1
20 TABLET, FILM COATED ORAL 2 TIMES DAILY
Status: DISCONTINUED | OUTPATIENT
Start: 2020-01-14 | End: 2020-01-20 | Stop reason: HOSPADM

## 2020-01-14 RX ORDER — SODIUM CHLORIDE 0.9 % (FLUSH) 0.9 %
10 SYRINGE (ML) INJECTION EVERY 12 HOURS SCHEDULED
Status: DISCONTINUED | OUTPATIENT
Start: 2020-01-14 | End: 2020-01-20 | Stop reason: HOSPADM

## 2020-01-14 RX ORDER — AMMONIUM LACTATE 12 G/100G
LOTION TOPICAL PRN
Status: DISCONTINUED | OUTPATIENT
Start: 2020-01-14 | End: 2020-01-20 | Stop reason: HOSPADM

## 2020-01-14 RX ORDER — METOPROLOL SUCCINATE 25 MG/1
25 TABLET, EXTENDED RELEASE ORAL DAILY
Status: DISCONTINUED | OUTPATIENT
Start: 2020-01-14 | End: 2020-01-15

## 2020-01-14 RX ORDER — ONDANSETRON 2 MG/ML
4 INJECTION INTRAMUSCULAR; INTRAVENOUS EVERY 4 HOURS PRN
Status: DISCONTINUED | OUTPATIENT
Start: 2020-01-14 | End: 2020-01-20 | Stop reason: HOSPADM

## 2020-01-14 RX ORDER — ACETAMINOPHEN 325 MG/1
650 TABLET ORAL EVERY 4 HOURS PRN
Status: DISCONTINUED | OUTPATIENT
Start: 2020-01-14 | End: 2020-01-20 | Stop reason: HOSPADM

## 2020-01-14 RX ORDER — GABAPENTIN 600 MG/1
1200 TABLET ORAL
Status: DISCONTINUED | OUTPATIENT
Start: 2020-01-14 | End: 2020-01-20 | Stop reason: HOSPADM

## 2020-01-14 RX ORDER — ASPIRIN 325 MG
325 TABLET ORAL NIGHTLY
Status: DISCONTINUED | OUTPATIENT
Start: 2020-01-14 | End: 2020-01-20 | Stop reason: HOSPADM

## 2020-01-14 RX ADMIN — FUROSEMIDE 40 MG: 10 INJECTION, SOLUTION INTRAMUSCULAR; INTRAVENOUS at 09:09

## 2020-01-14 RX ADMIN — ATORVASTATIN CALCIUM 80 MG: 40 TABLET, FILM COATED ORAL at 03:25

## 2020-01-14 RX ADMIN — FAMOTIDINE 20 MG: 20 TABLET ORAL at 03:23

## 2020-01-14 RX ADMIN — Medication 10 ML: at 21:10

## 2020-01-14 RX ADMIN — GABAPENTIN 600 MG: 600 TABLET, FILM COATED ORAL at 03:53

## 2020-01-14 RX ADMIN — ZOLPIDEM TARTRATE 10 MG: 5 TABLET ORAL at 02:06

## 2020-01-14 RX ADMIN — FAMOTIDINE 20 MG: 20 TABLET ORAL at 21:11

## 2020-01-14 RX ADMIN — ENOXAPARIN SODIUM 40 MG: 40 INJECTION SUBCUTANEOUS at 09:09

## 2020-01-14 RX ADMIN — METOPROLOL SUCCINATE 25 MG: 25 TABLET, FILM COATED, EXTENDED RELEASE ORAL at 09:09

## 2020-01-14 RX ADMIN — FAMOTIDINE 20 MG: 20 TABLET ORAL at 09:09

## 2020-01-14 RX ADMIN — ZOLPIDEM TARTRATE 10 MG: 5 TABLET ORAL at 21:11

## 2020-01-14 RX ADMIN — ASPIRIN 325 MG ORAL TABLET 325 MG: 325 PILL ORAL at 03:24

## 2020-01-14 RX ADMIN — GABAPENTIN 1800 MG: 600 TABLET, FILM COATED ORAL at 21:11

## 2020-01-14 RX ADMIN — ASPIRIN 325 MG ORAL TABLET 325 MG: 325 PILL ORAL at 21:10

## 2020-01-14 RX ADMIN — ATORVASTATIN CALCIUM 80 MG: 40 TABLET, FILM COATED ORAL at 21:10

## 2020-01-14 RX ADMIN — GABAPENTIN 1200 MG: 600 TABLET, FILM COATED ORAL at 12:45

## 2020-01-14 RX ADMIN — Medication 10 MG: at 22:55

## 2020-01-14 ASSESSMENT — PAIN SCALES - GENERAL: PAINLEVEL_OUTOF10: 0

## 2020-01-14 NOTE — ED PROVIDER NOTES
ED Attending Attestation Note     Date of evaluation: 1/13/2020    This patient was seen by the resident. I have seen and examined the patient, agree with the workup, evaluation, management and diagnosis. The care plan has been discussed. I have reviewed the ECG and concur with the resident's interpretation. My assessment reveals a well-appearing male in no acute distress but chronically ill-appearing presents today with shortness of breath for the past 2 days. Endorses some lower extremity swelling and orthopnea as well.   My evaluation clear lung sounds bilaterally with a soft and benign abdominal exam.     Aparna Herrera MD  01/13/20 [id number]

## 2020-01-14 NOTE — ED NOTES
Patient wants to get information about having his home health nurse come out to his residence more often.       Dimas Stevens RN  01/14/20 [id number]

## 2020-01-14 NOTE — H&P
Hospital Medicine History & Physical      PCP: INES Herron    Date of Admission: 1/13/2020    Date of Service: Pt seen/examined on 01/14/20 and Admitted to Inpatient with expected LOS greater than two midnights due to medical therapy. Chief Complaint:  Shortness of breath      History Of Present Illness:   67 y.o. male Ashia Vang   - PMHx of CAD s/p CABG, chronic back pain with intrathecal baclofen pump, Afib, BPH., CKD, venous stasis of bilateral lower extremities who presented to the ED with reports that he has had worsening shortness of breath over the past several days that worsens when laying flat. He denies any chest pain, cough, congestion, runny nose or any other URI type symptoms. He does report some bilateral lower extremity swelling which is largely unchanged from his baseline. In the ED; VSS, not requiring oxygen supplementation. Initial VBG with 7.28/59.9. Labs significant for K+ 5.5, trop elevated at 0.03, proBNP 398, no leukocytosis, Hgb 13.2 with elevated MCV. UA showed SpGr > 1.030, small blood, protein and +ve for nitrite and LE with  WBC, 4+ bacteria. CXR with left lower lobe atelactasis and small left sided pleural effusion. EKG with sinus bradycardia no acute ST changes.     Past Medical History:          Diagnosis Date    BPH (benign prostatic hyperplasia)     Carotid stenosis 12/2013    JESU 23-41% stenosis; LICA 70-42% stenosis    Cellulitis 12/2013    LLE    CHF with unknown LVEF ([facility] [address] 1/13/2020    Chronic back pain     Diverticular disease     Erectile dysfunction     GERD (gastroesophageal reflux disease)     History of atrial fibrillation     Hypercholesteremia     Hypertension     Lower GI bleed     MDRO (multiple drug resistant organisms) resistance 10/26/2019    urine    Neuromuscular disorder (HCC)     spasticity    Renal insufficiency     Risk for falls     uses walker    Tinea corporis 12/2013    LLE    Vitamin B12 deficiency        Past Surgical History:          Procedure Laterality Date    BACK SURGERY  2006    lower lumbar    CORONARY ARTERY BYPASS GRAFT  12/13/2013    CABG x 5 (Dr Asher Brumfield)   [facility] N/A 1/10/2019    CYSTOSCOPY performed by Papi Warren MD at [facility] [address] Right 5/1/2015    [facility]       Medications Prior to Admission:      Prior to Admission medications    Medication Sig Start Date End Date Taking? Authorizing Provider   metoprolol succinate (TOPROL XL) 25 MG extended release tablet Take 25 mg by mouth daily   Yes Historical Provider, MD   ranitidine (ZANTAC) 300 MG tablet Take 300 mg by mouth nightly   Yes Historical Provider, MD   gabapentin (NEURONTIN) 600 MG tablet Take 1,200 mg by mouth Daily with lunch. .   Yes Historical Provider, MD   gabapentin (NEURONTIN) 600 MG tablet Take 1,800 mg by mouth nightly. .   Yes Historical Provider, MD   Melatonin 10 MG TABS Take 10 mg by mouth nightly    Yes Historical Provider, MD   ferrous sulfate 325 (65 Fe) MG tablet Take 325 mg by mouth daily (with breakfast)   Yes Historical Provider, MD   niacin 500 MG tablet Take 1 tablet by mouth daily as needed    Yes Historical Provider, MD   aspirin 325 MG tablet Take 325 mg by mouth nightly    Yes Historical Provider, MD   gabapentin (NEURONTIN) 600 MG tablet Take 600 mg by mouth daily. .   Yes Historical Provider, MD   atorvastatin (LIPITOR) 80 MG tablet Take 80 mg by mouth nightly. Yes Historical Provider, MD   zolpidem (AMBIEN) 10 MG tablet Take 10 mg by mouth nightly. Yes Historical Provider, MD       Allergies:  Bactrim [sulfamethoxazole-trimethoprim]    Social History:      The patient currently lives at home    TOBACCO:   reports that he quit smoking about 50 years ago. He has never used smokeless tobacco.  ETOH:   reports no history of alcohol use.       Family History:      Reviewed      Problem Relation Age of Onset    Heart Disease Father        REVIEW OF SYSTEMS:   Pertinent positives as noted in the HPI. All other systems reviewed and negative. PHYSICAL EXAM PERFORMED:    BP (!) 121/104   Pulse 70   Temp 95.4 °F (35.2 °C) (Rectal)   Resp 14   Ht 5' 11\" (1.803 m)   Wt 180 lb (81.6 kg)   SpO2 97%   BMI 25.10 kg/m²     General appearance:  No apparent distress, appears stated age and cooperative. HEENT:  Normal cephalic, atraumatic without obvious deformity. Pupils equal, round, and reactive to light. Extra ocular muscles intact. Conjunctivae/corneas clear. Neck: Supple, with full range of motion. No jugular venous distention. Trachea midline. Respiratory:  Normal respiratory effort. Clear to auscultation, bilaterally without Rales/Wheezes/Rhonchi. Cardiovascular:  Regular rate and rhythm with normal S1/S2 without murmurs, rubs or gallops. Abdomen: Soft, non-tender, non-distended with normal bowel sounds. Musculoskeletal:  No clubbing, cyanosis  2+ edema bilaterally. Full range of motion without deformity. Bilateral wounds present on the foot  Skin: Skin color, texture, turgor normal.  No rashes or lesions. Neurologic:  Neurovascularly intact without any focal sensory/motor deficits. Cranial nerves: II-XII intact, grossly non-focal.  Psychiatric:  Alert and oriented, thought content appropriate, normal insight  Capillary Refill: Brisk,< 3 seconds   Peripheral Pulses: +2 palpable, equal bilaterally       Labs:     Recent Labs     01/13/20 2128 01/14/20  0433   WBC 6.5 5.8   HGB 13.2* 13.0*   HCT 41.2 42.1    133*     Recent Labs     01/13/20 2128 01/14/20  0433    142   K 5.5* 5.4*    108   CO2 23 17*   BUN 36* 36*   CREATININE 1.0 0.9   CALCIUM 9.6 9.6     No results for input(s): AST, ALT, BILIDIR, BILITOT, ALKPHOS in the last 72 hours. No results for input(s): INR in the last 72 hours.   Recent Labs     01/13/20 2128 01/14/20  0331   TROPONINI 0.03* 0.04*       Urinalysis:      Lab Results   Component Value Date    NITRU POSITIVE 01/13/2020    WBCUA

## 2020-01-14 NOTE — CARE COORDINATION
Case Management Assessment           Initial Evaluation                Date / Time of Evaluation: 1/14/2020 4:11 PM                 Assessment Completed by: Diane Harrington    Patient Name: Destin Vanessa     YOB: 1940  Diagnosis: CHF with unknown LVEF ([facility] [address] [I50.9]  CHF with unknown LVEF [facility]) [I50.9]     Date / Time: 1/13/2020  8:35 PM    Patient Admission Status: Inpatient    If patient is discharged prior to next notation, then this note serves as note for discharge by case management. [facility]    Chart Reviewed: Yes  Patient/ Family Interviewed: Yes    Initial assessment completed at bedside with: Patient    Hospitalization in the last 30 days: No    Emergency Contacts:  Extended Emergency Contact Information  Primary Emergency Contact: [place] [address] Phone: [phone]  Work Phone: [phone]  Mobile Phone: [phone]  Relation: Child  Secondary Emergency Contact: Dana Callahan  Address: [address]  Home Phone: [phone]  Relation: Other    Advance Directives:   Code Status: Full [address]: No    Financial  Payor: Noelle Kumar / Plan: [employer] PLUS HMO / Product Type: *No Product type* /     Pre-cert required for SNF: Yes    [facility] - Delilah Valerorebekah [phone] - F [phone]  [address] [place] [address]  Phone: [phone] Fax: [phone]    Jazlyn Read [address] [place] [address] [phone]  [address] [address]  Phone: [phone] Fax: [phone]      Potential assistance Purchasing Medications:    Does Patient want to participate in local refill/ meds to beds program?:      Meds To Beds General Rules:  1. Can ONLY be done Monday- Friday between 8:30am-5pm  2. Prescription(s) must be in pharmacy by 3pm to be filled same day  3. Copy of patient's insurance/ prescription drug card and patient face sheet must be sent along with the prescription(s)  4. Cost of Rx cannot be added to hospital bill. If financial assistance is needed, please contact unit  or ;  or  CANNOT provide pharmacy voucher for patients co-pays  5. Patients can then  the prescription on their way out of the hospital at discharge, or pharmacy can deliver to the bedside if staff is available. (payment due at time of pick-up or delivery - cash, check, or card accepted)     Able to afford home medications/ co-pay costs: Yes    ADLS  Support Systems:      PT AM-PAC:   /24  OT AM-PAC:   /24    [place]: home alone, daughter upstairs  Steps:     Plans to RETURN to current housing: Yes  Barriers to RETURNING to current housing: medical complications, weakness    Lilo Melchor [address]  Currently ACTIVE with [address]: Yes  Home Care Agency: [facility]  Phone: [phone]  Fax: [phone]    Currently ACTIVE with [place] on Aging: Yes  Passport/ Waiver: No  Passport/ Waiver Services: [address] for 1 hours/ day for 2 days per week      [facility] Provider:   Equipment: wheelchair    Home Oxygen and [address] prior to admission: No  [address]: Not Applicable    Dialysis  Active with HD/PD prior to admission: No    DISCHARGE PLAN:  Disposition: Home with [address]: TBD vs. SNF     Transportation PLAN for discharge: family     Factors facilitating achievement of predicted outcomes: Family support, Motivated, Cooperative and Pleasant    Barriers to discharge: Medical complications    Additional Case Management Notes: Referred to patient for d/c planning. Spoke to patient at bedside. Patient is a 67year old male admitted for SOB. Patient usually lives at home alone. Daughter lives upstairs. Patient reports he uses a wheelchair for mobility.  Patient is current with [facility] for RN. Patient is current with [place] [facility] for a home health aide 2 times a week. Patient may need SNF on d/c. No other needs at this time. Advanced Quality list of home care agencies given. The Plan for Transition of Care is related to the following treatment goals of CHF with unknown LVEF ([address] [I50.9]  CHF with unknown LVEF ([address] [I50.9]    The Patient and/or patient representative Brett Galan and his family were provided with a choice of provider and agrees with the discharge plan Yes    Freedom of choice list was provided with basic dialogue that supports the patient's individualized plan of care/goals and shares the quality data associated with the providers.  Yes      Care Transition patient: No    Electronically signed by ARIC Rothman, RADHAMES on 1/14/2020 at 4:12 PM

## 2020-01-14 NOTE — ED PROVIDER NOTES
and neck stiffness. Skin: Negative for rash and wound. Neurological: Negative for dizziness and weakness. Psychiatric/Behavioral: Negative for behavioral problems and confusion. Past Medical, Surgical, Family, and Social History     He has a past medical history of BPH (benign prostatic hyperplasia), Carotid stenosis, Cellulitis, CHF with unknown LVEF ([facility] [address] Chronic back pain, Diverticular disease, Erectile dysfunction, GERD (gastroesophageal reflux disease), History of atrial fibrillation, Hypercholesteremia, Hypertension, Lower GI bleed, MDRO (multiple drug resistant organisms) resistance, Neuromuscular disorder ([facility] [address] Renal insufficiency, Risk for falls, Tinea corporis, and Vitamin B12 deficiency. 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 His family history includes Heart Disease in his father. He reports that he quit smoking about 50 years ago. He has never used smokeless tobacco. He reports that he does not drink alcohol or use drugs. Medications     Previous Medications    ASPIRIN 325 MG TABLET    Take 325 mg by mouth nightly     ATORVASTATIN (LIPITOR) 80 MG TABLET    Take 80 mg by mouth nightly. FERROUS SULFATE 325 (65 FE) MG TABLET    Take 325 mg by mouth daily (with breakfast)    GABAPENTIN (NEURONTIN) 600 MG TABLET    Take 600 mg by mouth daily. Tonya Case GABAPENTIN (NEURONTIN) 600 MG TABLET    Take 1,200 mg by mouth Daily with lunch. Tonya Case GABAPENTIN (NEURONTIN) 600 MG TABLET    Take 1,800 mg by mouth nightly. Tonya Case     MELATONIN 10 MG TABS    Take 10 mg by mouth nightly     METOPROLOL SUCCINATE (TOPROL XL) 25 MG EXTENDED RELEASE TABLET    Take 25 mg by mouth daily    NIACIN 500 MG TABLET    Take 1 tablet by mouth daily as needed     RANITIDINE (ZANTAC) 300 MG TABLET    Take 300 mg by mouth nightly    TRIAMCINOLONE (ARISTOCORT) 0.5 % CREAM    Apply topically 2 times daily as needed (Bilateral legs for itching)    ZOLPIDEM (AMBIEN) 10 MG TABLET    Take 10 mg by mouth nightly. Allergies      He is allergic to bactrim [sulfamethoxazole-trimethoprim]. Physical Exam     INITIAL VITALS: BP: (!) 152/64, Temp: 95.4 °F (35.2 °C), Pulse: 50, Resp: 16, SpO2: 97 %   Physical Exam  Vitals signs reviewed. Constitutional:       General: He is not in acute distress. Appearance: He is well-developed. HENT:      Head: Normocephalic and atraumatic. Eyes:      Conjunctiva/sclera: Conjunctivae normal.   Neck:      Musculoskeletal: Normal range of motion and neck supple. Cardiovascular:      Rate and Rhythm: Normal rate and regular rhythm. Heart sounds: Normal heart sounds. No murmur. No friction rub. No gallop. Pulmonary:      Effort: Pulmonary effort is normal. No respiratory distress. Breath sounds: Normal breath sounds. No wheezing or rales. Chest:      Chest wall: No tenderness. Abdominal:      General: Bowel sounds are normal. There is no distension. Palpations: Abdomen is soft. Tenderness: There is no tenderness. There is no right CVA tenderness, left CVA tenderness, guarding or rebound. Musculoskeletal: Normal range of motion. Right lower leg: Edema present. Left lower leg: Edema present. Skin:     General: Skin is warm and dry. Neurological:      Mental Status: He is alert and oriented to person, place, and time. Diagnostic Results     EKG   Interpreted in conjunction with emergency department physicianNo att. providers found  Rhythm: 1 degree AV block  Rate:49  Axis: normal  Ectopy:none  Conduction: normal  ST Segments:flattening in  aVl  T Waves: no acute change  Q Waves:none  Clinical Impression: no acute changes and 1st degree AV block    RADIOLOGY:  XR CHEST STANDARD (2 VW)   Final Result   1.   Consolidative changes identified within the posterior medial aspect the left lower lobe correspond to rounded atelectatic changes on chest CT dated 9/13/2019.   2. Small left pleural effusion. 3. Hiatal hernia.            LABS:   Results for orders placed or performed during the hospital encounter of 01/13/20   CBC Auto Differential   Result Value Ref Range    WBC 6.5 4.0 - 11.0 K/uL    RBC 4.06 (L) 4.20 - 5.90 M/uL    Hemoglobin 13.2 (L) 13.5 - 17.5 g/dL    Hematocrit 41.2 40.5 - 52.5 %    .6 (H) 80.0 - 100.0 fL    MCH 32.6 26.0 - 34.0 pg    MCHC 32.1 31.0 - 36.0 g/dL    RDW 16.1 (H) 12.4 - 15.4 %    Platelets 292 646 - 451 K/uL    MPV 9.8 5.0 - 10.5 fL    Neutrophils % 79.1 %    Lymphocytes % 13.7 %    Monocytes % 5.3 %    Eosinophils % 1.5 %    Basophils % 0.4 %    Neutrophils Absolute 5.2 1.7 - 7.7 K/uL    Lymphocytes Absolute 0.9 (L) 1.0 - 5.1 K/uL    Monocytes Absolute 0.3 0.0 - 1.3 K/uL    Eosinophils Absolute 0.1 0.0 - 0.6 K/uL    Basophils Absolute 0.0 0.0 - 0.2 K/uL   Basic Metabolic Panel w/ Reflex to MG   Result Value Ref Range    Sodium 140 136 - 145 mmol/L    Potassium reflex Magnesium 5.5 (H) 3.5 - 5.1 mmol/L    Chloride 108 99 - 110 mmol/L    CO2 23 21 - 32 mmol/L    Anion Gap 9 3 - 16    Glucose 140 (H) 70 - 99 mg/dL    BUN 36 (H) 7 - 20 mg/dL    CREATININE 1.0 0.8 - 1.3 mg/dL    GFR Non-African American >60 >60    GFR African American >60 >60    Calcium 9.6 8.3 - 10.6 mg/dL   Troponin   Result Value Ref Range    Troponin 0.03 (H) <0.01 ng/mL   Brain Natriuretic Peptide   Result Value Ref Range    Pro- 0 - 449 pg/mL   Blood Gas, Venous   Result Value Ref Range    pH, Nick 7.275 (L) 7.350 - 7.450    pCO2, Nick 59.9 (H) 41.0 - 51.0 mmHg    pO2, Nick 31.1 25.0 - 40.0 mmHg    HCO3, Venous 26.9 24.0 - 28.0 mmol/L    Base Excess, Nick -0.6 -2.0 - 3.0 mmol/L    O2 Sat, Nick 52 Not established %    Carboxyhemoglobin 1.4 0.0 - 1.5 %    MetHgb, Nick 0.4 0.0 - 1.5 %    TC02 (Calc), Nick 29 mmol/L    Hemoglobin, Nick, Reduced 47.50 %   Urinalysis, reflex to microscopic (Lab)   Result Value Ref Range Color, UA Yellow Straw/Yellow    Clarity, UA Clear Clear    Glucose, Ur Negative Negative mg/dL    Bilirubin Urine Negative Negative    Ketones, Urine Negative Negative mg/dL    Specific Gravity, UA >=1.030 1.005 - 1.030    Blood, Urine SMALL (A) Negative    pH, UA 6.0 5.0 - 8.0    Protein, UA 30 (A) Negative mg/dL    Urobilinogen, Urine 1.0 <2.0 E.U./dL    Nitrite, Urine POSITIVE (A) Negative    Leukocyte Esterase, Urine MODERATE (A) Negative    Microscopic Examination YES     Urine Type NotGiven    Microscopic Urinalysis   Result Value Ref Range    WBC, UA  (A) 0 - 5 /HPF    RBC, UA 0-2 0 - 2 /HPF    Epi Cells 0-2 /HPF    Bacteria, UA 4+ (A) /HPF    Amorphous, UA 1+ (A) /HPF       ED BEDSIDE ULTRASOUND:  none    RECENT VITALS:  BP: (!) 149/69, Temp: 95.4 °F (35.2 °C), Pulse: 53, Resp: 14, SpO2: 100 %     Procedures     none    ED Course     Past Medical Hx, Past Surgical Hx, Social Hx, Allergies, and Family Hx were reviewed. The patient was given the following medications:  Orders Placed This Encounter   Medications    DISCONTD: cefTRIAXone (ROCEPHIN) 1 g IVPB in 50 mL D5W minibag    cefepime (MAXIPIME) 1 g IVPB minibag       CONSULTS:   IP CONSULT TO HOSPITALIST    MEDICAL DECISION MAKING / ASSESSMENT / Kimberly Kaplan is a 67 y.o. male who presented to the emergency department with a historyand presentation as described above in HPI. The patient was evaluated by myself and the ED Attending Physician. A full history and physical was performed. Onpresentation the patient was comfortable, in no pain and in no acute distress. Vitals showed no significant abnormalities, and he was hemodynamically stable. Exam as above. Based upon history and physical examination, there was concern for CHF and PNA. Given the patient's history and physical exam, there is low suspicion for ACS, PE and dissection. The patient's CBC was largely unremarkable.   BMP did show a potassium of 5.5 and a BUN of 36 with a creatinine of 1. VBG did show a respiratory acidosis with some compensation. UA did show signs of infection. Chest x-ray showed a left pleural effusion. The patient was treated with IV cefepime as he is resistant to ceftriaxone on prior urine cultures. Patient would also benefit from admission for cardiac evaluation and echo given his dyspnea and orthopnea as well as lower extremity edema. I discussed the case with medicine who accepted the patient for admission for further evaluation and management. At this time the patient has been admitted to medicine for further evaluation and management. All results were discussed with the patient at length, concerns were addressed and allquestions answered. Risks, benefits, and alternatives were discussed with the patient, and he agrees on this disposition. The patient will continue to bemonitored here in the emergency department until which time he is moved to his new treatment location. This patient was also evaluated by the attending physician. All care plans were discussed and agreed upon. Clinical Impression     1. Dyspnea, unspecified type    2.  Elevated troponin        Disposition     PATIENT REFERRED TO:  Albert Tran            DISCHARGE MEDICATIONS:  New Prescriptions    No medications on file       DISPOSITION Admitted 01/13/2020 10:55:29 Mason Godfrey MD  Resident  01/13/20 8290

## 2020-01-14 NOTE — ED NOTES
Patient has a BM the size of a medium plum. Marlon Demark and formed with little odor. Patient was cleaned and a new depends placed on patient. Barrier cream was applied to sacral area. Small wounds noted. Chucks pad replaced and positioned patient on his right side to relieve pressure. Patient tolerated well and stated that he no longer feels pressure on his \"bottom\".       Mariana Couch, RN  01/14/20 0798

## 2020-01-15 LAB
ANION GAP SERPL CALCULATED.3IONS-SCNC: 14 MMOL/L (ref 3–16)
BASOPHILS ABSOLUTE: 0.1 K/UL (ref 0–0.2)
BASOPHILS RELATIVE PERCENT: 1 %
BUN BLDV-MCNC: 38 MG/DL (ref 7–20)
CALCIUM SERPL-MCNC: 9.2 MG/DL (ref 8.3–10.6)
CHLORIDE BLD-SCNC: 102 MMOL/L (ref 99–110)
CO2: 21 MMOL/L (ref 21–32)
CREAT SERPL-MCNC: 1.4 MG/DL (ref 0.8–1.3)
EOSINOPHILS ABSOLUTE: 0.4 K/UL (ref 0–0.6)
EOSINOPHILS RELATIVE PERCENT: 6.8 %
GFR AFRICAN AMERICAN: 59
GFR NON-AFRICAN AMERICAN: 49
GLUCOSE BLD-MCNC: 105 MG/DL (ref 70–99)
HCT VFR BLD CALC: 34.6 % (ref 40.5–52.5)
HEMOGLOBIN: 11.3 G/DL (ref 13.5–17.5)
LYMPHOCYTES ABSOLUTE: 1.4 K/UL (ref 1–5.1)
LYMPHOCYTES RELATIVE PERCENT: 25.1 %
MCH RBC QN AUTO: 32.3 PG (ref 26–34)
MCHC RBC AUTO-ENTMCNC: 32.5 G/DL (ref 31–36)
MCV RBC AUTO: 99.2 FL (ref 80–100)
MONOCYTES ABSOLUTE: 0.5 K/UL (ref 0–1.3)
MONOCYTES RELATIVE PERCENT: 8.9 %
NEUTROPHILS ABSOLUTE: 3.3 K/UL (ref 1.7–7.7)
NEUTROPHILS RELATIVE PERCENT: 58.2 %
PDW BLD-RTO: 15.2 % (ref 12.4–15.4)
PLATELET # BLD: 127 K/UL (ref 135–450)
PMV BLD AUTO: 9.4 FL (ref 5–10.5)
POTASSIUM REFLEX MAGNESIUM: 5.4 MMOL/L (ref 3.5–5.1)
RBC # BLD: 3.49 M/UL (ref 4.2–5.9)
SODIUM BLD-SCNC: 137 MMOL/L (ref 136–145)
URINE CULTURE, ROUTINE: NORMAL
WBC # BLD: 5.7 K/UL (ref 4–11)

## 2020-01-15 PROCEDURE — 6370000000 HC RX 637 (ALT 250 FOR IP): Performed by: INTERNAL MEDICINE

## 2020-01-15 PROCEDURE — 1200000000 HC SEMI PRIVATE

## 2020-01-15 PROCEDURE — 80048 BASIC METABOLIC PNL TOTAL CA: CPT

## 2020-01-15 PROCEDURE — 36415 COLL VENOUS BLD VENIPUNCTURE: CPT

## 2020-01-15 PROCEDURE — 2580000003 HC RX 258: Performed by: INTERNAL MEDICINE

## 2020-01-15 PROCEDURE — 6360000002 HC RX W HCPCS: Performed by: INTERNAL MEDICINE

## 2020-01-15 PROCEDURE — 85025 COMPLETE CBC W/AUTO DIFF WBC: CPT

## 2020-01-15 RX ORDER — CASTOR OIL AND BALSAM, PERU 788; 87 MG/G; MG/G
OINTMENT TOPICAL 2 TIMES DAILY
Status: DISCONTINUED | OUTPATIENT
Start: 2020-01-15 | End: 2020-01-20 | Stop reason: HOSPADM

## 2020-01-15 RX ADMIN — Medication 10 ML: at 21:43

## 2020-01-15 RX ADMIN — ASPIRIN 325 MG ORAL TABLET 325 MG: 325 PILL ORAL at 21:42

## 2020-01-15 RX ADMIN — CASTOR OIL AND BALSAM, PERU: 788; 87 OINTMENT TOPICAL at 21:42

## 2020-01-15 RX ADMIN — FAMOTIDINE 20 MG: 20 TABLET ORAL at 21:42

## 2020-01-15 RX ADMIN — Medication 10 MG: at 21:42

## 2020-01-15 RX ADMIN — ZOLPIDEM TARTRATE 10 MG: 5 TABLET ORAL at 21:42

## 2020-01-15 RX ADMIN — FAMOTIDINE 20 MG: 20 TABLET ORAL at 10:16

## 2020-01-15 RX ADMIN — ATORVASTATIN CALCIUM 80 MG: 40 TABLET, FILM COATED ORAL at 21:42

## 2020-01-15 RX ADMIN — GABAPENTIN 1200 MG: 600 TABLET, FILM COATED ORAL at 12:54

## 2020-01-15 RX ADMIN — ENOXAPARIN SODIUM 40 MG: 40 INJECTION SUBCUTANEOUS at 10:16

## 2020-01-15 RX ADMIN — GABAPENTIN 1800 MG: 600 TABLET, FILM COATED ORAL at 21:42

## 2020-01-15 RX ADMIN — Medication 10 ML: at 10:17

## 2020-01-15 ASSESSMENT — PAIN SCALES - GENERAL
PAINLEVEL_OUTOF10: 0

## 2020-01-15 NOTE — CARE COORDINATION
Case Management Assessment           Daily Note                 Date/ Time of Note: 1/15/2020 5:22 PM         Note completed by: Teofilo Angelo    Patient Name: Ceci Liang  YOB: 1940    Diagnosis:CHF with unknown LVEF ([address] [I50.9]  CHF with unknown LVEF ([address] [I50.9]  Patient Admission Status: Inpatient    Date of Admission:1/13/2020  8:35 PM Length of Stay: 2 GLOS:        Current Plan of Care: Home with [facility] vs. SNF (if needed), Wound care on board for wounds on buttocks and R knee. Podiatry consulted.   ________________________________________________________________________________________  PT AM-PAC:   / 24 per last evaluation on: N/A    OT AM-PAC:   / 24 per last evaluation on: N/A    DME Needs for discharge: Patient has a w/c at home for mobility.   ________________________________________________________________________________________  Discharge Plan: To Be Determined DUE TO: SNF vs. home with [facility]  [facility]     Active with [facility] for aide 2 times weekly. Tentative discharge date: 1/16 vs 1/17     Current barriers to discharge: Pending Medical Clearance,     Referrals completed: Home Health Care: [facility]. Resources/ information provided: SNF List  ________________________________________________________________________________________  Case Management Notes: SW met with patient at bedside on this date. Patient has missed placed Advanced Quality List for home care. Patient to provide home care and SNF list in AM to discuss possible options. Patient would like to remain with [facility]. New wound needs per Wound Care. SW will continue to follow. SW provided contact information to patient/family and placed information on white board in room should needs arise. No other needs noted at this time. Esa Voss and his family were provided with choice of provider; he and his family are in agreement with the discharge plan.     Care

## 2020-01-15 NOTE — PROGRESS NOTES
Podiatric Surgery Daily Progress Note  Iveth Block  Subjective :   Patient seen and examined this am at the bedside. Patient denies any acute overnight events. Patient denies N/V/F/C/SOB. Patient denies calf pain, thigh pain, chest pain. Review of Systems: A 12 point review of symptoms is unremarkable with the exception of the chief complaint. Patient specifically denies nausea, fever, vomiting, chills, shortness of breath, chest pain, abdominal pain, constipation or difficulty urinating. Objective     /62   Pulse (!) 45   Temp 96.8 °F (36 °C) (Rectal)   Resp 18   Ht 5' 11\" (1.803 m)   Wt 178 lb 5.6 oz (80.9 kg)   SpO2 96%   BMI 24.88 kg/m²     I/O:    Intake/Output Summary (Last 24 hours) at 1/15/2020 0710  Last data filed at 1/15/2020 3302  Gross per 24 hour   Intake 900 ml   Output 2430 ml   Net -1530 ml              Wt Readings from Last 3 Encounters:   01/15/20 178 lb 5.6 oz (80.9 kg)   12/17/19 188 lb 7.9 oz (85.5 kg)   11/19/19 182 lb 8 oz (82.8 kg)       LABS:    Recent Labs     01/13/20  2128 01/14/20  0433   WBC 6.5 5.8   HGB 13.2* 13.0*   HCT 41.2 42.1    133*        Recent Labs     01/14/20  1459      K 5.0      CO2 25   PHOS 4.3   BUN 33*   CREATININE 1.1      No results for input(s): PROT, INR, APTT in the last 72 hours. LOWER EXTREMITY EXAMINATION    Dressing to Bilateral LE intact. No strikethrough noted to the external dressing. Minimal drainage noted to the internal layers of the dressing. VASCULAR: DP and PT pulses are non-palpable b/l 2/2 edema. DP and PT pulses are biphasic via handheld Doppler B/L.  CFT is brisk to the digits of the foot b/l. Skin temperature is warm to cool from proximal to distal with no focal calor noted. No edema noted. No pain with calf compression b/l.     NEUROLOGIC: Gross and epicritic sensation is diminished b/l.  Protective sensation is finished at all pedal sites b/l.     DERMATOLOGIC: Partial thickness wound noted to the dorsum of the left foot. Wound measures 5.0 x 6.0 x 0.1 cm. Wound bed is xerotic with granular tissue noted. Wound does not probe, track, or undermine. No drainage or malodor noted. No signs of increased erythema, edema, or warmth noted. No fluctuance or crepitus noted. Multiple superficial excoriations noted to the anterior aspect of the bilateral lower extremities. Dermatological changes noted to the bilateral lower extremities. Diffuse xerosis noted to the bilateral lower extremities. MUSCULOSKELETAL: Muscle strength is 4/5 for all pedal groups tested. No pain with palpation of the foot or ankle b/l. Ankle joint ROM is decreased in dorsiflexion with the knee extended. IMAGING:  Narrative   EXAM: Left foot 3 views       INDICATION: open wound; Rule out OM,       COMPARISON: None       FINDINGS:       Bones are osteopenic. Mild arthrosis of the first toe and the midfoot. No radiopaque foreign body. Arterial calcification.           Impression       1. No definite evidence of osteomyelitis. 2. Atherosclerotic disease. ASSESSMENT/PLAN    1. Partial-thickness ulceration dorsum, left foot likely 2/2 fluid overload and CHF  2.   Venous stasis dermatitis, B/L likely 2/2 fluid overload    -Patient seen and examined this a.m. at bedside  -Bradycardic, otherwise VSS; no new labs this a.m.  -X-rays reviewed, impression above; no signs of soft tissue emphysema or cortical destruction; bones were noted to be osteopenic  -Pre-albumin 14.7; wound healing oral nutrition supplements ordered  -Left lower extremity dressing change this a.m. consisting of hydrogel to wound on dorsum of foot, Adaptic, DSD, Ace bandages from just in front of the toes to just below the left knee  -Right lower extremity dressing change this a.m. consisting of Adaptic, DSD, Ace bandages from just in front of the toes to just below the right knee  -We will consider placing JUANJO hose on right lower extremity, if right lower extremity edema continues to improve  -Prevalon boots were ordered to room to prevent further deep tissue injury and should be worn at all times while in bed  -Lac-Hydrin ordered to room    DISPO: Stable partial-thickness ulceration dorsum, left foot and venous stasis dermatitis bilateral lower extremities-improving; no acute signs of infection noted from podiatry standpoint; will continue to provide local wound care while in-house    Discussed assessment and plan with Dr. Dominique Archer, D.P.M. at bedside. Marsha Owen DPM PGY-2  Pager: [phone]    Patient was seen and evaluated at bedside. Agree with residents assessment and treatment plan.   Leanne Street DPM

## 2020-01-15 NOTE — PLAN OF CARE
Problem: Falls - Risk of:  Goal: Will remain free from falls  Description  Will remain free from falls  Outcome: Met This Shift  Note:   Patient at risk for falls. Patient resting quietly in bed. Side rails up x 3. Bed locked in lowest position. Bed alarm on. Bedside table and call light within reach. Patient instructed to call for assistance. Patient verbalized understanding. No attempts to get up on own. Will continue to monitor. Problem: Risk for Impaired Skin Integrity  Goal: Tissue integrity - skin and mucous membranes  Description  Structural intactness and normal physiological function of skin and  mucous membranes. Outcome: Ongoing  Note:   Pt at risk for skin breakdown. Skin assessment complete (see flowsheets). Pts skin cleansed with inc cleanser when incont of stool. Zinc cream placed on coccyx area. Wound consult in for open areas on coccyx. Pt repositioned in bed with pillow support q 2. Will continue to monitor. Problem: HEMODYNAMIC STATUS  Goal: Patient has stable vital signs and fluid balance  Outcome: Ongoing  Note:   Pt HR has remained 40-50s. Pt denies SOB or dizziness. Pt Bps have been soft throughout the shift. Map has remained aove 65. Attending on call was notified and said to monitor. IV lasix was held due to low Bps. Daily weights done at 6 am. Pt has had adequate output of 400 ml of yellow urine. Will continue to monitor.

## 2020-01-15 NOTE — PROGRESS NOTES
distention. Trachea midline. Respiratory:  Normal respiratory effort. Clear to auscultation, bilaterally without Rales/Wheezes/Rhonchi. Cardiovascular:  Regular rate and rhythm with normal S1/S2 without murmurs, rubs or gallops. Abdomen: Soft, non-tender, non-distended with normal bowel sounds. Musculoskeletal:  No clubbing, cyanosis  2+ edema bilaterally. Full range of motion without deformity. Bilateral wounds present on the foot  Skin: Skin color, texture, turgor normal.  No rashes or lesions. Neurologic:  Neurovascularly intact without any focal sensory/motor deficits. Cranial nerves: II-XII intact, grossly non-focal.  Psychiatric:  Alert and oriented, thought content appropriate, normal insight  Capillary Refill: Brisk,< 3 seconds   Peripheral Pulses: +2 palpable, equal bilaterally   LABS:  Recent Labs     01/13/20 2128 01/14/20  0433 01/15/20  0645   WBC 6.5 5.8 5.7   HGB 13.2* 13.0* 11.3*   HCT 41.2 42.1 34.6*    133* 127*                                                                    Recent Labs     01/14/20 0433 01/14/20  1459 01/15/20  0645    140 137   K 5.4* 5.0 5.4*    104 102   CO2 17* 25 21   BUN 36* 33* 38*   CREATININE 0.9 1.1 1.4*   GLUCOSE 91 91 105*     No results for input(s): AST, ALT, ALB, BILITOT, ALKPHOS in the last 72 hours. Recent Labs     01/13/20 2128 01/14/20  0331 01/14/20  1459   TROPONINI 0.03* 0.04* 0.05*     No results for input(s): BNP in the last 72 hours. No results for input(s): CHOL, HDL in the last 72 hours. Invalid input(s): LDLCALCU  No results for input(s): INR in the last 72 hours. Assessment & Plan:    Patient Active Problem List:    Dyspnea: appears to be resolved. Etiology not entirely clear, does not appear to be c/w heart failure or volume overload. Diuretics dc'd, monitor sx's. Could be related to bradycardia? b blocker dc'd.        Bilateral lower extremity edema - no edema present at this time, echo WNL diuretics dc'd.

## 2020-01-15 NOTE — PROGRESS NOTES
[facility] Wound Ostomy Continence Nurse  Follow-up Progress Note       [facility]  MEDICAL RECORD NUMBER:  [id number]  AGE:  67 y.o. GENDER:  male  :  1940  TODAY'S DATE:  1/15/2020    Subjective:   Wound Identification: buttocks, R knee  Wound Type:IAD/MASD, trauma  Contributing Factors:incontinence, weakness, trauma  Patient Goal of Care:  [x] Wound Healing  [] Odor Control  [x] Palliative Care  [] Pain Control   [] Other:     Objective:    BP (!) 91/53   Pulse 52   Temp 97.5 °F (36.4 °C) (Oral)   Resp 16   Ht 5' 11\" (1.803 m)   Wt 178 lb 5.6 oz (80.9 kg)   SpO2 96%   BMI 24.88 kg/m²   Terrance Risk Score: Terrance Scale Score: 15  Assessment:   Measurements:  Wound 19 Knee Anterior;Right dry thick intact scab (Active)   Dressing/Treatment Open to air 1/15/2020  3:47 PM   Wound Assessment Dry; Intact; Black; Lorretta Lust 1/15/2020  3:47 PM   Drainage Amount None 1/15/2020  3:47 PM   Odor None 1/15/2020  3:47 PM   Number of days: 56       Wound 19 Sacrum Left -buttock - IAD/MASD partial thick slight denuded inside scarring (Active)   Wound Other 1/15/2020  3:54 PM   Dressing/Treatment Other (comment) 1/15/2020  3:54 PM   Wound Width (cm) 1 cm 2019 10:00 AM   Wound Depth (cm) 0.1 cm 2019 10:00 AM   Wound Surface Area (cm^2) 1 cm^2 2019 10:00 AM   Wound Volume (cm^3) 0.1 cm^3 2019 10:00 AM   Wound Assessment Pink 1/15/2020  3:54 PM   Drainage Amount None 1/15/2020  3:54 PM   Odor None 1/15/2020  3:54 PM   Margins Undefined edges 1/15/2020  3:54 PM   Usha-wound Assessment Yellow-brown;Hyperpigmented 1/15/2020  3:54 PM   Non-staged Wound Description Partial thickness 2019 10:00 AM   Number of days: 28       Response to treatment: no c/o's  Plan:   Plan of Care: Wound 19 Knee Anterior;Right dry thick intact scab-Dressing/Treatment: Open to air  Wound 19 Sacrum Left -buttock - IAD/MASD partial thick slight denuded inside

## 2020-01-15 NOTE — PROGRESS NOTES
Pt HR has been 40-50s (lowest is 38) Pt denies dizziness or SOB. Dr. Asia Farah notified via perfect serve.  Will continue to monitor

## 2020-01-16 LAB
ANION GAP SERPL CALCULATED.3IONS-SCNC: 13 MMOL/L (ref 3–16)
BASOPHILS ABSOLUTE: 0 K/UL (ref 0–0.2)
BASOPHILS RELATIVE PERCENT: 0.7 %
BUN BLDV-MCNC: 45 MG/DL (ref 7–20)
CALCIUM SERPL-MCNC: 9.1 MG/DL (ref 8.3–10.6)
CHLORIDE BLD-SCNC: 104 MMOL/L (ref 99–110)
CO2: 22 MMOL/L (ref 21–32)
CREAT SERPL-MCNC: 1.2 MG/DL (ref 0.8–1.3)
EOSINOPHILS ABSOLUTE: 0.4 K/UL (ref 0–0.6)
EOSINOPHILS RELATIVE PERCENT: 6.9 %
GFR AFRICAN AMERICAN: >60
GFR NON-AFRICAN AMERICAN: 58
GLUCOSE BLD-MCNC: 99 MG/DL (ref 70–99)
HCT VFR BLD CALC: 37 % (ref 40.5–52.5)
HEMOGLOBIN: 12 G/DL (ref 13.5–17.5)
LYMPHOCYTES ABSOLUTE: 1.1 K/UL (ref 1–5.1)
LYMPHOCYTES RELATIVE PERCENT: 21.6 %
MCH RBC QN AUTO: 32.4 PG (ref 26–34)
MCHC RBC AUTO-ENTMCNC: 32.5 G/DL (ref 31–36)
MCV RBC AUTO: 99.7 FL (ref 80–100)
MONOCYTES ABSOLUTE: 0.5 K/UL (ref 0–1.3)
MONOCYTES RELATIVE PERCENT: 10.3 %
NEUTROPHILS ABSOLUTE: 3.2 K/UL (ref 1.7–7.7)
NEUTROPHILS RELATIVE PERCENT: 60.5 %
PDW BLD-RTO: 15.3 % (ref 12.4–15.4)
PLATELET # BLD: 128 K/UL (ref 135–450)
PMV BLD AUTO: 9.4 FL (ref 5–10.5)
POTASSIUM REFLEX MAGNESIUM: 5.5 MMOL/L (ref 3.5–5.1)
RBC # BLD: 3.71 M/UL (ref 4.2–5.9)
SODIUM BLD-SCNC: 139 MMOL/L (ref 136–145)
WBC # BLD: 5.3 K/UL (ref 4–11)

## 2020-01-16 PROCEDURE — 6360000002 HC RX W HCPCS: Performed by: INTERNAL MEDICINE

## 2020-01-16 PROCEDURE — 6370000000 HC RX 637 (ALT 250 FOR IP): Performed by: HOSPITALIST

## 2020-01-16 PROCEDURE — 2580000003 HC RX 258: Performed by: INTERNAL MEDICINE

## 2020-01-16 PROCEDURE — 1200000000 HC SEMI PRIVATE

## 2020-01-16 PROCEDURE — 80048 BASIC METABOLIC PNL TOTAL CA: CPT

## 2020-01-16 PROCEDURE — 36415 COLL VENOUS BLD VENIPUNCTURE: CPT

## 2020-01-16 PROCEDURE — 85025 COMPLETE CBC W/AUTO DIFF WBC: CPT

## 2020-01-16 PROCEDURE — 6370000000 HC RX 637 (ALT 250 FOR IP): Performed by: INTERNAL MEDICINE

## 2020-01-16 RX ORDER — SODIUM POLYSTYRENE SULFONATE 15 G/60ML
30 SUSPENSION ORAL; RECTAL ONCE
Status: COMPLETED | OUTPATIENT
Start: 2020-01-16 | End: 2020-01-16

## 2020-01-16 RX ADMIN — ATORVASTATIN CALCIUM 80 MG: 40 TABLET, FILM COATED ORAL at 23:33

## 2020-01-16 RX ADMIN — ENOXAPARIN SODIUM 40 MG: 40 INJECTION SUBCUTANEOUS at 08:34

## 2020-01-16 RX ADMIN — SODIUM POLYSTYRENE SULFONATE 30 G: 15 SUSPENSION ORAL; RECTAL at 18:07

## 2020-01-16 RX ADMIN — Medication 10 ML: at 23:22

## 2020-01-16 RX ADMIN — GABAPENTIN 1200 MG: 600 TABLET, FILM COATED ORAL at 12:58

## 2020-01-16 RX ADMIN — CASTOR OIL AND BALSAM, PERU: 788; 87 OINTMENT TOPICAL at 23:22

## 2020-01-16 RX ADMIN — CASTOR OIL AND BALSAM, PERU: 788; 87 OINTMENT TOPICAL at 08:35

## 2020-01-16 RX ADMIN — FAMOTIDINE 20 MG: 20 TABLET ORAL at 23:32

## 2020-01-16 RX ADMIN — ZOLPIDEM TARTRATE 10 MG: 5 TABLET ORAL at 23:33

## 2020-01-16 RX ADMIN — GABAPENTIN 1800 MG: 600 TABLET, FILM COATED ORAL at 23:32

## 2020-01-16 RX ADMIN — Medication 10 ML: at 08:35

## 2020-01-16 RX ADMIN — ASPIRIN 325 MG ORAL TABLET 325 MG: 325 PILL ORAL at 23:32

## 2020-01-16 RX ADMIN — FAMOTIDINE 20 MG: 20 TABLET ORAL at 08:34

## 2020-01-16 ASSESSMENT — PAIN SCALES - GENERAL
PAINLEVEL_OUTOF10: 0

## 2020-01-16 NOTE — ADT AUTH CERT
Utilization Reviews         Physician Advisor Referral by Leonel Jaime RN         Review Status Review Entered   In Primary 1/15/2020 12:29       Criteria Review   Letter of Status Determination: Current Status   Inpatient is Appropriate            Pt Name:  Billy Espitia   MR#  [id number]   [facility]#  [id number]   [address]  [id number]  @ [facility]   Hospitalization date  1/13/2020  8:35 PM   Current Attending Physician  Tatyana Berger MD   Principal diagnosis  SOB   Clinicals     67 y. o. male Tushar R Booso   - PMHx of CAD s/p CABG, chronic back pain with intrathecal baclofen pump, Afib, BPH., CKD, venous stasis of bilateral lower extremities who presented to the ED with reports that he has had worsening shortness of breath over the past several days that worsens when laying flat. He denies any chest pain, cough, congestion, runny nose or any other URI type symptoms.  He does report some bilateral lower extremity swelling which is largely unchanged from his baseline.     In the ED; VSS, not requiring oxygen supplementation. Initial VBG with 7.28/59.9. Labs significant for K+ 5.5, trop elevated at 0.03, proBNP 398, no leukocytosis, Hgb 13.2 with elevated MCV. UA showed SpGr > 1.030, small blood, protein and +ve for nitrite and LE with  WBC, 4+ bacteria. CXR with left lower lobe atelactasis and small left sided pleural effusion.  EKG with sinus bradycardia no acute ST changes.   improving, maintaining O2 sats on RA, hyperkalemic with worsening creatinine borderline hypotensive       [place] MCG criteria   Does  NOT apply     STATUS DETERMINATION  On the basis of clinical data, available documentaion, we believe that the current status of this patient as Inpatient is Appropriate      The final decision of the patient's hospitalization status depends on the attending physician's judgment    Additional comments      Insurance  Payor: Alexa Gray / Plan: [employer] HMO / Product CXR with left lower lobe atelactasis and small left sided pleural effusion. EKG with sinus bradycardia no acute ST changes.       ASSESSMENT/PLAN[de-identified]       Active Hospital Problems     Diagnosis Date Noted   · CHF with unknown LVEF ([facility] [address] [I50.9] 01/13/2020       Dyspnea: worse with laying flat,    - CXR with small left sided effusion, and LLL atelactasis   - Started on IV Lasix on admission   - Echo ordered, and results pending       Bilateral lower extremity edema   - Echo ordered   - Check Urine Protein cr ratio   - ACE wraps       Lower extremity wounds:   - Consult podiatary, he will benefit from follow up in wound care center on discharge       Sinus bradycardia present on admission   - Monitor on telemetry   - continue metoprolol XL 25 mg OD       CAD s/p CABG   - Continue ASA, high intensity statin       Chronic Indwelling Schneider due to neurogenic bladder with MDRO    - No Leukocytosis or fever   - He was started on cefepime on admission which I have stopped, this is colonization in the setting chronic indwelling schneider       Elevated troponin   - Trend flat; he denies any anginal symptoms   - stress test if wall motion abnormalities seen on Echo       Hyperkalemia: POA   - Recently was in the hospital where he was hyperkalemic as well   - Improved with Lasix administration   - Monitor closely   - No Spironolactone            DVT Prophylaxis: Lovenox   Diet: DIET LOW SODIUM 2 GM; 1500 ml   Code Status: Full Code       PT/OT Eval Status: N/A      Podiatry consult   IMPRESSION/RECOMMENDATIONS:         1.  Partial-thickness ulceration dorsum, left foot likely 2/2 fluid overload and CHF       2.  Venous stasis dermatitis, B/L likely 2/2 fluid overload       -Patient seen and examined this p.m. at bedside   -VSS, no leukocytosis noted (Wbc 5.8), Hgb 13.0   -We will discuss with attending as to whether or not she would like x-rays   -No wound culture taken at this time as there is nothing to culture   -No acute signs of infection noted from podiatry standpoint at this time   -Left foot wound dressed with hydrogel, Adaptic, DSD, Ace bandages from just in front of the toes to just below the left knee   -Right lower extremity dressed with Adaptic, DSD, Ace bandages from just in front of the toes to just below the right knee   -Patient encouraged to elevate extremities to aid in decreasing lower extremity edema   -Lac-Hydrin ordered to room   -Prevalon boots ordered to room to aid in offloading of heels and prevent further deep tissue injury       DISPO: Stable partial-thickness ulceration dorsum, left foot and venous stasis dermatitis bilateral lower extremities; no acute signs of infection noted from podiatry standpoint; we will continue to provide local wound care while in-house

## 2020-01-16 NOTE — PROGRESS NOTES
Patient schneider bag noted to be empty and had not been emptied this shift. Bag had hole in it and urine was covering floor. Bag replaced. Bladder scanned for 40 mL of urine.

## 2020-01-16 NOTE — PLAN OF CARE
Problem: Risk for Impaired Skin Integrity  Goal: Tissue integrity - skin and mucous membranes  Description  Structural intactness and normal physiological function of skin and  mucous membranes. Outcome: Ongoing  Note:   Pt at risk for skin breakdown. Skin assessed this shift. No new breakdown noted. Pressure ulcer precautions in place. Patient turned and repositioned q 2 hours. Will continue to monitor. Problem: Falls - Risk of:  Goal: Will remain free from falls  Description  Will remain free from falls  Outcome: Ongoing  Note:   Pt is at risk for falls. Fall precautions in place. Call light in reach, bed alarm is on, bed locked and in the lowest position. Will continue to monitor.

## 2020-01-16 NOTE — DISCHARGE INSTR - COC
Continuity of Care Form    Patient Name: Meg Irwin   :  1940  MRN:  [id number]    Admit date:  2020  Discharge date:      Code Status Order: Full Code   Advance Directives:   [address] Directive Type of Healthcare Directive Copy in [address] Agent's Name Healthcare Agent's Phone Number    [phone]  Yes, patient has an advance directive for healthcare treatment  Living will;Durable power of  for health care  --  Adult Children  Ciara Marie  --          Admitting Physician:  Abram Patrick MD  PCP: Junaid Layton    Discharging Nurse: [facility] Unit/Room#: 8209/7581-42  Discharging Unit Phone Number: ***    Emergency Contact:   Extended Emergency Contact Information  Primary Emergency Contact: [place] [address] Phone: [phone]  Work Phone: [phone]  Mobile Phone: [phone]  Relation: Child  Secondary Emergency Contact: Dana Callahan  Address: [address]  Home Phone: [phone]  Relation: Other    Past Surgical History:  Past Surgical History:   Procedure Laterality Date    BACK SURGERY  2006    lower lumbar    CORONARY ARTERY BYPASS GRAFT  2013    CABG x 5 (Dr Macarena Yeh)   [facility] N/A 1/10/2019    CYSTOSCOPY performed by Aria Hernandez MD at [address] 2015    [facility]       Immunization History:   Immunization History   Administered Date(s) Administered    Influenza Vaccine, unspecified formulation 2012, 2014, 10/13/2015, 2016, 2017, 10/26/2018, 2019    Influenza Virus Vaccine 2013    Influenza Whole 10/01/2007, 2010, 2011, 2012, 10/13/2015    Pneumococcal Conjugate 13-valent ([id number]) 2014    Pneumococcal Polysaccharide ([id number]) 2014       Active Problems:  Patient Active Problem List   Diagnosis Code    Hypotension I95.9    Light headed R42    Cellulitis L03.90    Essential hypertension I10    GERD (gastroesophageal reflux disease) K21.9    Acute blood loss anemia D62    Tinea corporis B35.4    Carotid stenosis I65.29    CAD (coronary artery disease) I25.10    S/P CABG x 5 Z95.1    Lower GI bleeding K92.2    Hip fracture, right ([facility]) S72.001A    Acute right hip pain M25.551    Acute low back pain without sciatica M54.5    Hypothermia T68. [place] [employer]    Complicated UTI (urinary tract infection) N39.0    ALIS (acute kidney injury) ([address] N17.9    Edema R60.9    Problem with urinary catheter ([address] T83. 9XXA    Acute encephalopathy G93.40    Chronic indwelling Iglesias catheter Z96.0    Hyperlipidemia E78.5    Bilateral lower leg cellulitis L03.116, L03.115    Neurogenic bladder N31.9    Bacteriuria R82.71    Abnormality of urethral meatus Q64.70    Gross hematuria R31.0    CHF with unknown LVEF ([facility]) I50.9       Isolation/Infection:   Isolation          Contact        Patient Infection Status     Infection Onset Added Last Indicated Last Indicated By Review Planned Expiration Resolved Resolved By    MDRO (multi-drug resistant organism) 10/26/19 10/28/19 11/19/19 Urine culture              Nurse Assessment:  Last Vital Signs: BP (!) 101/58   Pulse 52   Temp 97.8 °F (36.6 °C) (Oral)   Resp 16   Ht 5' 11\" (1.803 m)   Wt 178 lb 2 oz (80.8 kg)   SpO2 97%   BMI 24.84 kg/m²     Last documented pain score (0-10 scale): Pain Level: 0  Last Weight:   Wt Readings from Last 1 Encounters:   01/16/20 178 lb 2 oz (80.8 kg)     Mental Status:  {IP PT MENTAL STATUS:20030}    IV Access:  {[facility] ACCESS:702776049}    Nursing Mobility/ADLs:  Walking   {CHP DME ETIV:983219307}  Transfer  {CHP DME HSTI:795007656}  Bathing  {CHP DME NVJR:864485575}  Dressing  {CHP DME BFQD:249148424}  Toileting  {CHP DME YSXY:583311027}  Feeding  {CHP DME KMJO:631612832}  Med Admin  {[facility] COR[facility]LL [facility]:190127358}  Med Delivery   { [facility] H&P    PHYSICIAN SIGNATURE:  Electronically signed by Johnny Lagunas MD on 1/20/20 at 12:04 PM

## 2020-01-16 NOTE — PLAN OF CARE
Problem: Falls - Risk of:  Goal: Will remain free from falls  Description  Will remain free from falls  1/16/2020 0254 by Juan Jose Bolton RN  Outcome: Ongoing  Pt is at risk for falls. Fall precautions in place. Call light in reach, bed alarm is on, bed locked and in the lowest position. Will continue to monitor. Problem: Risk for Impaired Skin Integrity  Goal: Tissue integrity - skin and mucous membranes  Description  Structural intactness and normal physiological function of skin and  mucous membranes. 1/16/2020 0254 by Juan Jose Bolton RN  Outcome: Ongoing  Pt at risk for skin breakdown. Skin assessment complete. No new signs of skin breakdown. Pts skin cleansed with bath wipes. Pt repositioned in bed with pillow support q2. Specialty bed in place. Venelex applied as scheduled. Will continue to monitor.

## 2020-01-17 LAB
ANION GAP SERPL CALCULATED.3IONS-SCNC: 11 MMOL/L (ref 3–16)
BUN BLDV-MCNC: 42 MG/DL (ref 7–20)
CALCIUM SERPL-MCNC: 9 MG/DL (ref 8.3–10.6)
CHLORIDE BLD-SCNC: 104 MMOL/L (ref 99–110)
CO2: 23 MMOL/L (ref 21–32)
CREAT SERPL-MCNC: 1 MG/DL (ref 0.8–1.3)
EKG ATRIAL RATE: 53 BPM
EKG DIAGNOSIS: NORMAL
EKG P AXIS: 46 DEGREES
EKG P-R INTERVAL: 260 MS
EKG Q-T INTERVAL: 426 MS
EKG QRS DURATION: 94 MS
EKG QTC CALCULATION (BAZETT): 399 MS
EKG R AXIS: 29 DEGREES
EKG T AXIS: 72 DEGREES
EKG VENTRICULAR RATE: 53 BPM
GFR AFRICAN AMERICAN: >60
GFR NON-AFRICAN AMERICAN: >60
GLUCOSE BLD-MCNC: 98 MG/DL (ref 70–99)
POTASSIUM REFLEX MAGNESIUM: 5 MMOL/L (ref 3.5–5.1)
SODIUM BLD-SCNC: 138 MMOL/L (ref 136–145)

## 2020-01-17 PROCEDURE — 97530 THERAPEUTIC ACTIVITIES: CPT

## 2020-01-17 PROCEDURE — 36415 COLL VENOUS BLD VENIPUNCTURE: CPT

## 2020-01-17 PROCEDURE — 1200000000 HC SEMI PRIVATE

## 2020-01-17 PROCEDURE — 93005 ELECTROCARDIOGRAM TRACING: CPT | Performed by: INTERNAL MEDICINE

## 2020-01-17 PROCEDURE — 2580000003 HC RX 258: Performed by: INTERNAL MEDICINE

## 2020-01-17 PROCEDURE — 99222 1ST HOSP IP/OBS MODERATE 55: CPT | Performed by: INTERNAL MEDICINE

## 2020-01-17 PROCEDURE — 6360000002 HC RX W HCPCS: Performed by: INTERNAL MEDICINE

## 2020-01-17 PROCEDURE — 80048 BASIC METABOLIC PNL TOTAL CA: CPT

## 2020-01-17 PROCEDURE — 97162 PT EVAL MOD COMPLEX 30 MIN: CPT

## 2020-01-17 PROCEDURE — 6370000000 HC RX 637 (ALT 250 FOR IP): Performed by: INTERNAL MEDICINE

## 2020-01-17 PROCEDURE — 93010 ELECTROCARDIOGRAM REPORT: CPT | Performed by: INTERNAL MEDICINE

## 2020-01-17 PROCEDURE — 97167 OT EVAL HIGH COMPLEX 60 MIN: CPT

## 2020-01-17 RX ORDER — ISOSORBIDE MONONITRATE 30 MG/1
30 TABLET, EXTENDED RELEASE ORAL DAILY
Status: DISCONTINUED | OUTPATIENT
Start: 2020-01-17 | End: 2020-01-20 | Stop reason: HOSPADM

## 2020-01-17 RX ORDER — ISOSORBIDE MONONITRATE 30 MG/1
30 TABLET, EXTENDED RELEASE ORAL DAILY
Qty: 30 TABLET | Refills: 3 | Status: ON HOLD | OUTPATIENT
Start: 2020-01-17 | End: 2020-05-05 | Stop reason: HOSPADM

## 2020-01-17 RX ADMIN — CASTOR OIL AND BALSAM, PERU: 788; 87 OINTMENT TOPICAL at 09:33

## 2020-01-17 RX ADMIN — ASPIRIN 325 MG ORAL TABLET 325 MG: 325 PILL ORAL at 22:08

## 2020-01-17 RX ADMIN — Medication 10 ML: at 22:09

## 2020-01-17 RX ADMIN — ATORVASTATIN CALCIUM 80 MG: 40 TABLET, FILM COATED ORAL at 22:08

## 2020-01-17 RX ADMIN — Medication 10 ML: at 09:33

## 2020-01-17 RX ADMIN — FAMOTIDINE 20 MG: 20 TABLET ORAL at 09:32

## 2020-01-17 RX ADMIN — Medication 10 MG: at 23:04

## 2020-01-17 RX ADMIN — ISOSORBIDE MONONITRATE 30 MG: 30 TABLET, EXTENDED RELEASE ORAL at 14:50

## 2020-01-17 RX ADMIN — GABAPENTIN 1800 MG: 600 TABLET, FILM COATED ORAL at 22:08

## 2020-01-17 RX ADMIN — GABAPENTIN 1200 MG: 600 TABLET, FILM COATED ORAL at 14:50

## 2020-01-17 RX ADMIN — FAMOTIDINE 20 MG: 20 TABLET ORAL at 22:08

## 2020-01-17 RX ADMIN — CASTOR OIL AND BALSAM, PERU: 788; 87 OINTMENT TOPICAL at 22:10

## 2020-01-17 RX ADMIN — ZOLPIDEM TARTRATE 10 MG: 5 TABLET ORAL at 23:04

## 2020-01-17 RX ADMIN — ENOXAPARIN SODIUM 40 MG: 40 INJECTION SUBCUTANEOUS at 09:33

## 2020-01-17 ASSESSMENT — PAIN SCALES - GENERAL
PAINLEVEL_OUTOF10: 0

## 2020-01-17 NOTE — PROGRESS NOTES
MD  Diagnosis: CHF    Subjective  Subjective: Supine in bed on entry. Reports he has not yet been OOB since admission. Does admit to feeling weaker than baseline and attributes that he needs more assist at this time due to the chair being wider than his chair at home. Patient Currently in Pain: Yes(lower back pain and foot pain - 3/10 (back), 5/10 (foot))    Social/Functional History  Social/Functional History  Lives With: Alone  Type of Home: House(2 family home (first floor - dtr second floor))  Home Layout: One level  Home Access: Ramped entrance  Home Equipment: Lift chair, Wheelchair-electric, Alert Button(BSC)  Receives Help From: (home aides (M and Th (1 hour each visit) - empty urinals, laundy, cleaning, dishes, spongebath assist 1x/week); states \"they are wanting to extend it to 24/7\")  ADL Assistance: (home aides A with bath, other days he cleans up a little on his own; wears t-shirt and depends (only gets fully dressed if he has to leave for an appointment); schneider catheter at all times)  Homemaking Assistance: Needs assistance(homehealth; able to fix own meals)  Ambulation Assistance: (Does not walk)  Transfer Assistance: Independent(stand-pivot transfers)  Active : No  Patient's  Info: gets transportation to [employer]; does own shopping - uses motorized scooter in store; transportation service does bring groceries into the home (drop them off at doorway); Medical Transportation services [facility])  Additional Comments: Sleeps in lift chair. wc doesn't fit in bathroom so uses BSC or urinal.  Dtr usually does grocery shopping for him. Typically only leaves for appts.         Objective   Vision: Within Functional Limits  Hearing: Within functional limits      Orientation  Overall Orientation Status: Within Functional Limits(oriented to month/year - not date)     Observation/Palpation  Observation: multiple abrasions to BLEs - scratches and scabs                 Bed mobility  Supine to Sit: Stand by assistance(HOB elevated, bedrail)  Scooting: Contact guard assistance(to EOB)     ADLs  Toileting: pt incontinent BM (dependent care for hygiene)    Transfers  Stand Pivot Transfers: (squat pivots (bed>chair>bed>chair): Mod A (first transfer); min A (second transfer), CGA (3rd transfer))        Cognition  Overall Cognitive Status: Exceptions  Attention Span: Appears intact  Safety Judgement: Decreased awareness of need for safety;Decreased awareness of need for assistance  Insights: Decreased awareness of deficits                    LUE Strength  Gross LUE Strength: WFL  RUE Strength  Gross RUE Strength: WFL           Pt seen by OT for eval and treat. Treatment included:  Bed mobility, functional transfers            Plan  This note will serve as a discharge summary in the event the patient is discharged prior to next treatment session.     Plan  Times per week: 2-5  Times per day: Daily  Current Treatment Recommendations: Strengthening, Self-Care / ADL, Safety Education & Training, Endurance Training                                                    AM-PAC Score        AM-[facility] Inpatient Daily Activity Raw Score: 17 (01/17/20 1601)  AM-PAC Inpatient ADL T-Scale Score : 37.26 (01/17/20 1601)  ADL Inpatient CMS 0-100% Score: 50.11 (01/17/20 1601)  ADL Inpatient CMS G-Code Modifier : CK (01/17/20 1601)    Goals  Short term goals  Time Frame for Short term goals: Discharge  Short term goal 1: BSC transfer w/ SBA  Short term goal 2: 2 sets of 5 - chair pushups for increased independence w/ transfers  Patient Goals   Patient goals : to return home at discharge       Therapy Time   Individual Concurrent Group Co-treatment   Time In 1450         Time Out 1528         Minutes 38           Timed Code Treatment Minutes:   23    Total Treatment Minutes:  [facility] OTR/L #[id number]

## 2020-01-17 NOTE — PROGRESS NOTES
Hospitalist Progress Note      PCP: INES BLANDON [id number] [address]    Date of Admission: 1/13/2020    Chief Complaint: Shortness of breath    Hospital Course: This 67-year-old male with a past medical history of coronary artery disease status post CABG, chronic back pain pain, status post intrathecal baclofen pump, A. fib, BPH, chronic kidney disease, venous stasis bilateral lower extremity admitted with a shortness of breath acute on chronic CHF, bilateral lower extremity wound podiatry consulted and following    Subjective: Patient denies any chest pain no shortness of breath lives with his daughter wheelchair-bound.  -concerned about bradycardia and chest discomfort  -bp running high after stopping toprol      Medications:  Reviewed    Infusion Medications   Scheduled Medications    isosorbide mononitrate  30 mg Oral Daily    VENELEX   Topical BID    zolpidem  10 mg Oral Nightly    famotidine  20 mg Oral BID    gabapentin  1,800 mg Oral Nightly    gabapentin  1,200 mg Oral Lunch    atorvastatin  80 mg Oral Nightly    aspirin  325 mg Oral Nightly    sodium chloride flush  10 mL Intravenous 2 times per day    enoxaparin  40 mg Subcutaneous Daily     PRN Meds: sodium chloride flush, magnesium hydroxide, ondansetron, acetaminophen, ammonium lactate, melatonin      Intake/Output Summary (Last 24 hours) at 1/17/2020 1522  Last data filed at 1/17/2020 1440  Gross per 24 hour   Intake 720 ml   Output 525 ml   Net 195 ml       Physical Exam Performed:    BP (!) 124/52   Pulse 53   Temp 97.2 °F (36.2 °C) (Axillary)   Resp 16   Ht 5' 11\" (1.803 m)   Wt 178 lb 2 oz (80.8 kg)   SpO2 100%   BMI 24.84 kg/m²     General appearance: No apparent distress, appears stated age and cooperative. HEENT: Pupils equal, round, and reactive to light. Conjunctivae/corneas clear. Neck: Supple, with full range of motion. No jugular venous distention. Trachea midline. Respiratory:  Normal respiratory effort.  Clear to auscultation, ([address] [I50.9]     Dyspnea: Etiology not entirely clear, does not appear to be c/w heart failure or volume overload. Diuretics dc'd, monitor sx's. Could be related to bradycardia? b blocker dc'd.      Bilateral lower extremity edema - no edema present at this time, echo WNL diuretics dc'd.      Lower extremity wounds:  - Consult podiatary, he will benefit from follow up in wound care center on discharge appreciate their input     Sinus bradycardia present on admission  - 40-60, no active sx's.   - dc metoprolol XL, cont tele.     CAD s/p CABG  - Continue ASA, high intensity statin  Start imdur per previous cardiology notes     Chronic Indwelling Iglesias due to neurogenic bladder with MDRO   - No Leukocytosis or fever  - He was started on cefepime on admission, dc'd. CX grew 50K mixed anne.  No evidence of infection.      Hyperkalemia: POA, etiology not clear, lasix dc'd, repeat in am.     Chronic debility:- wheel chair bound    DVT Prophylaxis: Subcu Lovenox  Diet: DIET LOW SODIUM 2 GM; 1500 ml  Dietary Nutrition Supplements: Wound Healing Oral Supplement  Code Status: Full Code    PT/OT Eval Status:     Dispo - possible d.c pending cardiology recommendations     Kristy Irving MD

## 2020-01-17 NOTE — PLAN OF CARE
Problem: Falls - Risk of:  Goal: Will remain free from falls  Description  Will remain free from falls  1/17/2020 3197 by Parul Peterson RN  Outcome: Ongoing  Note:   Pt uses call light for needs and is aware of abilities. Bedside table, call light and needs within reach. Will continue to round on pt for safety/needs. 1/16/2020 1858 by Lexus Chilel RN  Outcome: Ongoing  Note:   Pt is at risk for falls. Fall precautions in place. Call light in reach, bed alarm is on, bed locked and in the lowest position. Will continue to monitor. Problem: Risk for Impaired Skin Integrity  Goal: Tissue integrity - skin and mucous membranes  Description  Structural intactness and normal physiological function of skin and  mucous membranes. 1/17/2020 6166 by Parul Peterson RN  Outcome: Ongoing  Note:   Pt has multiple open areas to his bilateral buttocks and redness to his coccyx and rich area. He has scattered abrasions and his legs and feet are dressed by podiatry. Iglesias draining to gravity. Skincare provided as needed for episodes of incontinence. Pt is on a specialty mattress and prevalon boots in place bilaterally. Will continue to assess skin and intervene to avoid skin breakdown. 1/16/2020 1858 by Lexus Chilel RN  Outcome: Ongoing  Note:   Pt at risk for skin breakdown. Skin assessed this shift. No new breakdown noted. Pressure ulcer precautions in place. Patient turned and repositioned q 2 hours. Will continue to monitor.        Problem: HEMODYNAMIC STATUS  Goal: Patient has stable vital signs and fluid balance  Outcome: Ongoing

## 2020-01-17 NOTE — PROGRESS NOTES
Podiatric Surgery Daily Progress Note  Cristal Block  Subjective :   Patient seen and examined at bedside this a.m. Patient denies any acute overnight events. Patient denies N/V/F/C/SOB. Patient is feeling much better today. Patient was wondering if compression stockings come in different colors. Patient denies calf pain, thigh pain, chest pain. Review of Systems: A 12 point review of symptoms is unremarkable with the exception of the chief complaint. Patient specifically denies nausea, fever, vomiting, chills, shortness of breath, chest pain, abdominal pain, constipation or difficulty urinating. Objective     /74   Pulse 56   Temp 98.2 °F (36.8 °C) (Oral)   Resp 16   Ht 5' 11\" (1.803 m)   Wt 178 lb 2 oz (80.8 kg)   SpO2 98%   BMI 24.84 kg/m²     I/O:    Intake/Output Summary (Last 24 hours) at 1/17/2020 0749  Last data filed at 1/16/2020 1616  Gross per 24 hour   Intake 600 ml   Output --   Net 600 ml              Wt Readings from Last 3 Encounters:   01/16/20 178 lb 2 oz (80.8 kg)   12/17/19 188 lb 7.9 oz (85.5 kg)   11/19/19 182 lb 8 oz (82.8 kg)       LABS:    Recent Labs     01/15/20  0645 01/16/20  0634   WBC 5.7 5.3   HGB 11.3* 12.0*   HCT 34.6* 37.0*   * 128*        Recent Labs     01/14/20  1459  01/17/20  0646      < > 138   K 5.0   < > 5.0      < > 104   CO2 25   < > 23   PHOS 4.3  --   --    BUN 33*   < > 42*   CREATININE 1.1   < > 1.0    < > = values in this interval not displayed. No results for input(s): PROT, INR, APTT in the last 72 hours. LOWER EXTREMITY EXAMINATION    Dressing to bilateral LE intact. No strikethrough drainage noted to the external dressing B/L. No strikethrough drainage noted to the internal dressing RLE. Minimal strikethrough drainage noted to the internal layers of the dressing LLE.       VASCULAR: DP and PT pulses are non-palpable b/l.  DP and PT pulses are biphasic via handheld Doppler B/L.  CFT is brisk to the digits of the foot b/l. Skin temperature is warm to cool from proximal to distal with no focal calor noted.   Relaxed skin tension lines noted B/L 2/2 Ace compressive therapy. No pain with calf compression b/l.     NEUROLOGIC: Gross and epicritic sensation is diminished b/l. Protective sensation is diminished at all pedal sites b/l.     DERMATOLOGIC: Dermatological changes noted to the bilateral lower extremities.  Diffuse xerosis noted to the bilateral lower extremities. Partial thickness wound noted to the dorsum of the left foot and improving clinically each day.  Wound measures 5.0cm x 6.0cm x superficial.  Wound bed is a mixture of granular tissue with newly epithelialized tissue.  Wound does not probe, track, or undermine.  No drainage or malodor noted.  No fluctuance or crepitus noted. No signs of infection noted.   Periwound intact epithelialized tissue.  Multiple superficial excoriations noted to the anterior aspect of the bilateral lower extremities. No drainage, malodor or signs of infection noted. Right lower extremity no open wounds or signs of infection noted. Improving edematous lower extremity 2/2 Ace compressive therapy. Picture's taken on 1/17/2020            Patient gave verbal consent for the picture taken at today's visit. MUSCULOSKELETAL: Muscle strength is 4/5 for all pedal groups tested. No pain with palpation of the foot or ankle b/l. Ankle joint ROM is decreased in dorsiflexion with the knee extended. IMAGING:  Narrative   EXAM: Left foot 3 views       INDICATION: open wound; Rule out OM,       COMPARISON: None       FINDINGS:       Bones are osteopenic. Mild arthrosis of the first toe and the midfoot. No radiopaque foreign body. Arterial calcification.           Impression       1. No definite evidence of osteomyelitis. 2. Atherosclerotic disease.      ASSESSMENT/PLAN    1.  Partial-thickness ulceration dorsum, left foot likely 2/2 fluid overload and CHF -

## 2020-01-17 NOTE — CONSULTS
[address]         Reason for Consultation/Chief Complaint: \"I have been having SOB. \"       History of Present Illness:  Estela Rosario is a 67 y.o. patient who presented to the hospital with complaints of sob. The patient has no chest pain but has had near syncope as outpatient. Has healing wounds on LEs followed by podiatry. HR in high 30s but SR and no symptoms on tele. Metoprolol was stopped on admission. History of CABG. No QRS widening nor junctional rhythm seen. Past Medical History:   has a past medical history of BPH (benign prostatic hyperplasia), Carotid stenosis, Cellulitis, Chronic back pain, Diverticular disease, Erectile dysfunction, GERD (gastroesophageal reflux disease), History of atrial fibrillation, Hypercholesteremia, Hypertension, Lower GI bleed, MDRO (multiple drug resistant organisms) resistance, Neuromuscular disorder ([facility] [address] Renal insufficiency, Risk for falls, Tinea corporis, and Vitamin B12 deficiency. Surgical History:   has a past surgical history that includes back surgery (2006); Foot surgery; Coronary artery bypass graft (12/13/2013); hip surgery (Right, 5/1/2015); and Cystoscopy (N/A, 1/10/2019). Social History:   reports that he quit smoking about 50 years ago. He has never used smokeless tobacco. He reports that he does not drink alcohol or use drugs. Family History:  No evidence for sudden cardiac death or premature CAD    Home Medications:  Were reviewed and are listed in nursing record. and/or listed below  Prior to Admission medications    Medication Sig Start Date End Date Taking?  Authorizing Provider   isosorbide mononitrate (IMDUR) 30 MG extended release tablet Take 1 tablet by mouth daily 1/17/20  Yes Jeramie Cortes MD   metoprolol succinate (TOPROL XL) 25 MG extended release tablet Take 25 mg by mouth daily   Yes Historical Provider, MD   ranitidine (ZANTAC) 300 MG tablet Take 300 mg by mouth nightly   Yes No abdominal pain, appetite loss, blood in stools. No change in bowel or bladder habits. · Genitourinary: No dysuria, or hematuria. · Musculoskeletal:  No gait disturbance, weakness or joint complaints. · Integumentary: No rash or pruritis. · Neurological: No headache, diplopia,numbness or tingling. No change in gait, balance, coordination, mood, affect, memory, mentation, behavior. · Psychiatric: No anxiety,  · Endocrine: No malaise,  · Hematologic/Lymphatic: No abnormal bruising  · Allergic/Immunologic: No nasal congestion      Physical Examination:    Vitals:    01/17/20 1448   BP: (!) 124/52   Pulse: 53   Resp: 16   Temp: 97.2 °F (36.2 °C)   SpO2: 100%    Weight: 178 lb 2 oz (80.8 kg)         General Appearance:  Alert, cooperative, no distress, appears stated age   Head:  Normocephalic, without obvious abnormality, atraumatic   Eyes:  PERRL   Nose: Nares normal,   Neck: Supple, JVP normal   Lungs:   Clear to auscultation bilaterally, respirations unlabored   Chest Wall:  No tenderness or deformity   Heart:  bradycardic rate and rhythm, normal S1, S2 normal, no murmur, II/VI HSM  No rub. No S3 / S4  gallop   Abdomen:   Soft, non-tender, +bowel sounds   Extremities: no cyanosis, no clubbing , Trace LE edema   Pulses: Diminished  Feel wrapped   Skin: LEs wrapped. Wounds noted in chart. Pysch: Normal mood and affect   Neurologic: No gross deficits.   CN II - XII grossly intact        Labs  CBC:   Lab Results   Component Value Date    WBC 5.3 01/16/2020    RBC 3.71 01/16/2020    HGB 12.0 01/16/2020    HCT 37.0 01/16/2020    MCV 99.7 01/16/2020    RDW 15.3 01/16/2020     01/16/2020     CMP:    Lab Results   Component Value Date     01/17/2020    K 5.0 01/17/2020     01/17/2020    CO2 23 01/17/2020    BUN 42 01/17/2020    CREATININE 1.0 01/17/2020    GFRAA >60 01/17/2020    AGRATIO 0.8 11/19/2019    LABGLOM >60 01/17/2020    GLUCOSE 98 01/17/2020    PROT 7.2 11/19/2019    CALCIUM 9.0 mitral and tricuspid regurgitation is present. Estimated pulmonary artery systolic pressure is 30 mmHg assuming a right   atrial pressure of 3 mmHg. Biatrial enlargement. Signature      ------------------------------------------------------------------   Electronically signed by Kingston Gibson MD   (Interpreting physician) on 01/14/2020 at 02:32 PM   ------------------------------------------------------------------      Findings      Left Ventricle   Normal left ventricle size. There is mild concentric left ventricular   hypertrophy. Overall left ventricular systolic function appears normal with   an ejection fraction of 55-60%. No regional wall motion abnormalities are   noted. Diastolic filling parameters suggests normal diastolic function. Mitral Valve   Mitral valve is structurally normal.   Trace mitral regurgitation is present. No evidence of mitral valve stenosis. Left Atrium   The left atrium is mildly dilated. Aortic Valve   The aortic valve appears tricuspid. The non coronary aortic valve leaflet appears calcific/thickened with   restricted mobility. No evidence of aortic valve regurgitation. No evidence of aortic valve stenosis. Aorta   The aortic root is normal in size. Right Ventricle   Normal right ventricular size and function. TAPSE 1.91 cm   RV S velocity 9.14 cm/s      Tricuspid Valve   Tricuspid valve is structurally normal.   Trivial tricuspid regurgitation. Estimated pulmonary artery systolic pressure is 30 mmHg assuming a right   atrial pressure of 3 mmHg. No evidence of tricuspid stenosis. Right Atrium   The right atrium is mildly dilated. Pulmonic Valve   The pulmonic valve is not well visualized. No evidence of pulmonic valve regurgitation. No evidence of pulmonic valve stenosis. Pericardial Effusion   No evidence of any pericardial effusion. Pleural Effusion   There is no pleural effusion. graft harvest.    3.  Rib blocks x5.    4.  Platelet gel application. 5.  Doppler verification of graft patency. SURGEON:  King Schultz MD       ASSISTANT:  Paulo Farias SA      ANESTHESIA:  GET.       ESTIMATED BLOOD LOSS:  N/A.       SPECIMENS:  None. COMPLICATIONS:  None. DRAINS:  Left pleural and mediastinal.       WIRES:  Atrial and ventricular. DETAILS OF PROCEDURE:  At a separate encounter, all risks, benefits and  alternatives of operative intervention were discussed with this patient, and  he agreed to proceed. He was brought to the operating room where general  endotracheal anesthesia was induced by the Anesthesia team without  difficulty. A roll was placed behind the shoulders, and patient was prepped  and draped in a sterile fashion. A purposeful time-out was undertaken,  confirming patient, procedure and antibiotic administration. Once this was accomplished, a median sternotomy was performed simultaneous  with lower extremity endoscopic saphenous vein graft harvest.  The left  internal mammary artery was  harvested from its bed utilizing no-touch  technique. Once satisfactory length, quality and caliber of conduit were  harvested, ACT guided heparinization was undertaken, and the LIMA was divided  distally and tucked in the apex of the left chest.  Pericardium was incised. A pericardial wall was created, and pursestring sutures were placed in the  ascending aorta and right atrium. We cannulated the patient with a Softflo  cannula and 2-staged atrial caval venous cannulas as well as antegrade and  retrograde cardioplegia cannulas uneventfully. We then placed the patient on cardiopulmonary bypass and inspected our  targets which were found to be satisfactory at the distal right coronary  OM-3, OM-1, the diagonal and the LAD. At this point, crossclamp was applied.    Cold blood induction antegrade cardioplegia was administered with prompt  cardiac arrest.

## 2020-01-18 LAB
ANION GAP SERPL CALCULATED.3IONS-SCNC: 11 MMOL/L (ref 3–16)
BUN BLDV-MCNC: 36 MG/DL (ref 7–20)
CALCIUM SERPL-MCNC: 8.7 MG/DL (ref 8.3–10.6)
CHLORIDE BLD-SCNC: 105 MMOL/L (ref 99–110)
CO2: 22 MMOL/L (ref 21–32)
CREAT SERPL-MCNC: 0.9 MG/DL (ref 0.8–1.3)
GFR AFRICAN AMERICAN: >60
GFR NON-AFRICAN AMERICAN: >60
GLUCOSE BLD-MCNC: 86 MG/DL (ref 70–99)
POTASSIUM REFLEX MAGNESIUM: 5.3 MMOL/L (ref 3.5–5.1)
SODIUM BLD-SCNC: 138 MMOL/L (ref 136–145)

## 2020-01-18 PROCEDURE — 36415 COLL VENOUS BLD VENIPUNCTURE: CPT

## 2020-01-18 PROCEDURE — 80048 BASIC METABOLIC PNL TOTAL CA: CPT

## 2020-01-18 PROCEDURE — 1200000000 HC SEMI PRIVATE

## 2020-01-18 PROCEDURE — 99232 SBSQ HOSP IP/OBS MODERATE 35: CPT | Performed by: NURSE PRACTITIONER

## 2020-01-18 PROCEDURE — 6360000002 HC RX W HCPCS: Performed by: INTERNAL MEDICINE

## 2020-01-18 PROCEDURE — 6370000000 HC RX 637 (ALT 250 FOR IP): Performed by: INTERNAL MEDICINE

## 2020-01-18 PROCEDURE — 2580000003 HC RX 258: Performed by: INTERNAL MEDICINE

## 2020-01-18 RX ADMIN — GABAPENTIN 1200 MG: 600 TABLET, FILM COATED ORAL at 12:12

## 2020-01-18 RX ADMIN — CASTOR OIL AND BALSAM, PERU: 788; 87 OINTMENT TOPICAL at 22:16

## 2020-01-18 RX ADMIN — ZOLPIDEM TARTRATE 10 MG: 5 TABLET ORAL at 23:09

## 2020-01-18 RX ADMIN — ENOXAPARIN SODIUM 40 MG: 40 INJECTION SUBCUTANEOUS at 09:15

## 2020-01-18 RX ADMIN — CASTOR OIL AND BALSAM, PERU: 788; 87 OINTMENT TOPICAL at 09:15

## 2020-01-18 RX ADMIN — GABAPENTIN 1800 MG: 600 TABLET, FILM COATED ORAL at 22:12

## 2020-01-18 RX ADMIN — ISOSORBIDE MONONITRATE 30 MG: 30 TABLET, EXTENDED RELEASE ORAL at 09:15

## 2020-01-18 RX ADMIN — FAMOTIDINE 20 MG: 20 TABLET ORAL at 09:15

## 2020-01-18 RX ADMIN — FAMOTIDINE 20 MG: 20 TABLET ORAL at 22:13

## 2020-01-18 RX ADMIN — ASPIRIN 325 MG ORAL TABLET 325 MG: 325 PILL ORAL at 22:13

## 2020-01-18 RX ADMIN — ATORVASTATIN CALCIUM 80 MG: 40 TABLET, FILM COATED ORAL at 22:13

## 2020-01-18 RX ADMIN — Medication 10 ML: at 22:15

## 2020-01-18 RX ADMIN — Medication 10 MG: at 23:09

## 2020-01-18 RX ADMIN — Medication 10 ML: at 09:16

## 2020-01-18 ASSESSMENT — PAIN SCALES - GENERAL
PAINLEVEL_OUTOF10: 0

## 2020-01-18 NOTE — PROGRESS NOTES
Hospitalist Progress Note      PCP: INES BLANDON [address]    Date of Admission: 1/13/2020    Chief Complaint: Shortness of breath    Hospital Course: This 27-year-old male with a past medical history of coronary artery disease status post CABG, chronic back pain pain, status post intrathecal baclofen pump, A. fib, BPH, chronic kidney disease, venous stasis bilateral lower extremity admitted with a shortness of breath acute on chronic CHF, bilateral lower extremity wound podiatry consulted and following    Subjective: Patient complains of shortness of breath is worse denies any chest pain no lower extremity edema      Medications:  Reviewed    Infusion Medications   Scheduled Medications    isosorbide mononitrate  30 mg Oral Daily    VENELEX   Topical BID    zolpidem  10 mg Oral Nightly    famotidine  20 mg Oral BID    gabapentin  1,800 mg Oral Nightly    gabapentin  1,200 mg Oral Lunch    atorvastatin  80 mg Oral Nightly    aspirin  325 mg Oral Nightly    sodium chloride flush  10 mL Intravenous 2 times per day    enoxaparin  40 mg Subcutaneous Daily     PRN Meds: sodium chloride flush, magnesium hydroxide, ondansetron, acetaminophen, ammonium lactate, melatonin      Intake/Output Summary (Last 24 hours) at 1/18/2020 1004  Last data filed at 1/18/2020 0926  Gross per 24 hour   Intake 1080 ml   Output 1850 ml   Net -770 ml       Physical Exam Performed:    /71   Pulse 57   Temp 97.4 °F (36.3 °C) (Oral)   Resp 16   Ht 5' 11\" (1.803 m)   Wt 175 lb (79.4 kg)   SpO2 95%   BMI 24.41 kg/m²     General appearance: No apparent distress, appears stated age and cooperative. HEENT: Pupils equal, round, and reactive to light. Conjunctivae/corneas clear. Neck: Supple, with full range of motion. No jugular venous distention. Trachea midline. Respiratory:  Normal respiratory effort. Clear to auscultation, bilaterally without Rales/Wheezes/Rhonchi.   Cardiovascular: Regular rate and rhythm with normal

## 2020-01-18 NOTE — PROGRESS NOTES
Podiatric Surgery Daily Progress Note  Jaime Block  Subjective :   Patient seen and examined at bedside this a.m. Patient denies any acute overnight events. Patient denies N/V/F/C/SOB. Patient is feeling much better today. Patient was wondering if compression stockings come in different colors. Patient denies calf pain, thigh pain, chest pain. Review of Systems: A 12 point review of symptoms is unremarkable with the exception of the chief complaint. Patient specifically denies nausea, fever, vomiting, chills, shortness of breath, chest pain, abdominal pain, constipation or difficulty urinating. Objective     /69   Pulse 56   Temp 96.8 °F (36 °C) (Oral)   Resp 16   Ht 5' 11\" (1.803 m)   Wt 175 lb (79.4 kg)   SpO2 95%   BMI 24.41 kg/m²     I/O:    Intake/Output Summary (Last 24 hours) at 1/18/2020 1448  Last data filed at 1/18/2020 0926  Gross per 24 hour   Intake 600 ml   Output 1850 ml   Net -1250 ml              Wt Readings from Last 3 Encounters:   01/18/20 175 lb (79.4 kg)   12/17/19 188 lb 7.9 oz (85.5 kg)   11/19/19 182 lb 8 oz (82.8 kg)       LABS:    Recent Labs     01/16/20  0634   WBC 5.3   HGB 12.0*   HCT 37.0*   *        Recent Labs     01/18/20  0735      K 5.3*      CO2 22   BUN 36*   CREATININE 0.9      No results for input(s): PROT, INR, APTT in the last 72 hours. LOWER EXTREMITY EXAMINATION    Dressing to bilateral LE intact. No strikethrough drainage noted to the external dressing B/L. No strikethrough drainage noted to the internal dressing RLE. Minimal strikethrough drainage noted to the internal layers of the dressing LLE. VASCULAR: DP and PT pulses are non-palpable b/l.  DP and PT pulses are biphasic via handheld Doppler B/L.  CFT is brisk to the digits of the foot b/l. Skin temperature is warm to cool from proximal to distal with no focal calor noted.   Relaxed skin tension lines noted B/L 2/2 Ace compressive therapy.  No pain with calf compression b/l.     NEUROLOGIC: Gross and epicritic sensation is diminished b/l. Protective sensation is diminished at all pedal sites b/l.     DERMATOLOGIC: Dermatological changes noted to the bilateral lower extremities.  Diffuse xerosis noted to the bilateral lower extremities. Partial thickness wound noted to the dorsum of the left foot and improving clinically each day.  Wound measures 5.0cm x 6.0cm x superficial.  Wound bed is a mixture of granular tissue with newly epithelialized tissue.  Wound does not probe, track, or undermine.  No drainage or malodor noted.  No fluctuance or crepitus noted. No signs of infection noted.   Periwound intact epithelialized tissue.  Multiple superficial excoriations noted to the anterior aspect of the bilateral lower extremities. No drainage, malodor or signs of infection noted. Right lower extremity no open wounds or signs of infection noted. Improving edematous lower extremity 2/2 Ace compressive therapy. Picture's taken on 1/17/2020            Patient gave verbal consent for the picture taken at today's visit. MUSCULOSKELETAL: Muscle strength is 4/5 for all pedal groups tested. No pain with palpation of the foot or ankle b/l. Ankle joint ROM is decreased in dorsiflexion with the knee extended. IMAGING:  Narrative   EXAM: Left foot 3 views       INDICATION: open wound; Rule out OM,       COMPARISON: None       FINDINGS:       Bones are osteopenic. Mild arthrosis of the first toe and the midfoot. No radiopaque foreign body. Arterial calcification.           Impression       1. No definite evidence of osteomyelitis. 2. Atherosclerotic disease. ASSESSMENT/PLAN    1.  Partial-thickness ulceration dorsum, left foot likely 2/2 fluid overload and CHF - improving. 2.  Venous stasis dermatitis, B/L likely 2/2 fluid overload    -Patient seen and examined this a.m. at bedside  -VSS afebrile, no leukocytosis noted.   -X-rays reviewed LLE, no signs of soft tissue emphysema or cortical destruction; bones were noted to be osteopenic; see impression above.  -Left lower extremity dressing: hydrogel to wound on dorsum aspect of foot, mepilex, JUANJO hose. -Nursing to apply JUANJO hose each morning and remove each evening.   -Prevalon boots applied on the patient's lower extremity after dressing change to aid in offloading and to prevent deep tissue injury. DISPO: Stable partial-thickness ulceration dorsum with new epithelization, left foot and venous stasis dermatitis bilateral lower extremities-resolved. No acute signs of infection noted from podiatry standpoint. Okay to discharge from podiatry standpoint. Will continue to monitor and do local wound care while in house. Patient is to follow-up with Dr. Nila Marin within 1 week of hospital discharge to the [facility] wound care center. Patient seen bedside with MECHELLE Fernandez.[email or web address].     WILBERT LópezM   Podiatric Resident, PGY-3  Pager: [phone]  1/18/2020, 2:49 PM

## 2020-01-18 NOTE — PLAN OF CARE
Problem: Risk for Impaired Skin Integrity  Goal: Tissue integrity - skin and mucous membranes  Outcome: Ongoing  Note:   Patient has multiple skin abrasions and breakdown to buttocks. Patient is on speciality mattress and is turn q 2 hours and as needed. Venelex ointment at bedside. Patient has schneider and schneider care was preformed during shift. Prevalon boots on. Will continue to monitor for skin breakdown. Problem: HEMODYNAMIC STATUS  Goal: Patient has stable vital signs and fluid balance  Outcome: Ongoing  Note:   VSS Will continue to monitor. Problem: Pain:  Description  Pain management should include both nonpharmacologic and pharmacologic interventions. Goal: Pain level will decrease  Outcome: Ongoing  Note:   Patient denies pain at this time. Will continue to monitor. Problem: Falls - Risk of:  Goal: Will remain free from falls  Note:   Pt at risk for falls. Free from falls this shift. Fall risk protocol in place. Bed in low position. Bed alarm and bed brakes in place. Fall risk sign posted . Patient in bed , side rails up x 2 and call light in reach. Will continue to monitor safety during hourly rounding.

## 2020-01-18 NOTE — PROGRESS NOTES
Issues:  -Bradycardia  -CAD/CABG  -HTN  -HLD  -pAF  -LE wounds   -Venostasis     Plan:  No BB d/t bradycardia   Imdur  ASA  Lipitor

## 2020-01-19 LAB
ANION GAP SERPL CALCULATED.3IONS-SCNC: 11 MMOL/L (ref 3–16)
BUN BLDV-MCNC: 31 MG/DL (ref 7–20)
CALCIUM SERPL-MCNC: 9.2 MG/DL (ref 8.3–10.6)
CHLORIDE BLD-SCNC: 104 MMOL/L (ref 99–110)
CO2: 24 MMOL/L (ref 21–32)
CREAT SERPL-MCNC: 1 MG/DL (ref 0.8–1.3)
GFR AFRICAN AMERICAN: >60
GFR NON-AFRICAN AMERICAN: >60
GLUCOSE BLD-MCNC: 126 MG/DL (ref 70–99)
POTASSIUM REFLEX MAGNESIUM: 5.2 MMOL/L (ref 3.5–5.1)
SODIUM BLD-SCNC: 139 MMOL/L (ref 136–145)

## 2020-01-19 PROCEDURE — 6360000002 HC RX W HCPCS: Performed by: INTERNAL MEDICINE

## 2020-01-19 PROCEDURE — 99232 SBSQ HOSP IP/OBS MODERATE 35: CPT | Performed by: NURSE PRACTITIONER

## 2020-01-19 PROCEDURE — 2580000003 HC RX 258: Performed by: INTERNAL MEDICINE

## 2020-01-19 PROCEDURE — 80048 BASIC METABOLIC PNL TOTAL CA: CPT

## 2020-01-19 PROCEDURE — 36415 COLL VENOUS BLD VENIPUNCTURE: CPT

## 2020-01-19 PROCEDURE — 6370000000 HC RX 637 (ALT 250 FOR IP): Performed by: INTERNAL MEDICINE

## 2020-01-19 PROCEDURE — 1200000000 HC SEMI PRIVATE

## 2020-01-19 RX ADMIN — Medication 10 ML: at 09:21

## 2020-01-19 RX ADMIN — ATORVASTATIN CALCIUM 80 MG: 40 TABLET, FILM COATED ORAL at 22:06

## 2020-01-19 RX ADMIN — ENOXAPARIN SODIUM 40 MG: 40 INJECTION SUBCUTANEOUS at 09:20

## 2020-01-19 RX ADMIN — GABAPENTIN 1200 MG: 600 TABLET, FILM COATED ORAL at 12:09

## 2020-01-19 RX ADMIN — FAMOTIDINE 20 MG: 20 TABLET ORAL at 22:07

## 2020-01-19 RX ADMIN — CASTOR OIL AND BALSAM, PERU: 788; 87 OINTMENT TOPICAL at 09:21

## 2020-01-19 RX ADMIN — ISOSORBIDE MONONITRATE 30 MG: 30 TABLET, EXTENDED RELEASE ORAL at 09:20

## 2020-01-19 RX ADMIN — Medication 10 ML: at 22:13

## 2020-01-19 RX ADMIN — CASTOR OIL AND BALSAM, PERU: 788; 87 OINTMENT TOPICAL at 22:13

## 2020-01-19 RX ADMIN — ASPIRIN 325 MG ORAL TABLET 325 MG: 325 PILL ORAL at 22:07

## 2020-01-19 RX ADMIN — FAMOTIDINE 20 MG: 20 TABLET ORAL at 09:20

## 2020-01-19 RX ADMIN — GABAPENTIN 1800 MG: 600 TABLET, FILM COATED ORAL at 22:07

## 2020-01-19 ASSESSMENT — PAIN SCALES - GENERAL
PAINLEVEL_OUTOF10: 0

## 2020-01-19 NOTE — PROGRESS NOTES
diminished b/l. Protective sensation is diminished at all pedal sites b/l.     DERMATOLOGIC: Dermatological changes noted to the bilateral lower extremities.  Diffuse xerosis noted to the bilateral lower extremities. Partial thickness wound noted to the dorsum of the left foot and improving clinically each day.  Wound measures 5.0cm x 6.0cm x superficial.  Wound bed is a mixture of granular tissue with newly epithelialized tissue.  Wound does not probe, track, or undermine.  No drainage or malodor noted.  No fluctuance or crepitus noted. No signs of infection noted.   Periwound intact epithelialized tissue.  Multiple superficial excoriations noted to the anterior aspect of the bilateral lower extremities. No drainage, malodor or signs of infection noted. Right lower extremity no open wounds or signs of infection noted. Improving edematous lower extremity 2/2 Ace compressive therapy. Picture's taken on 1/17/2020            Patient gave verbal consent for the picture taken at today's visit. MUSCULOSKELETAL: Muscle strength is 4/5 for all pedal groups tested. No pain with palpation of the foot or ankle b/l. Ankle joint ROM is decreased in dorsiflexion with the knee extended. IMAGING:  Narrative   EXAM: Left foot 3 views       INDICATION: open wound; Rule out OM,       COMPARISON: None       FINDINGS:       Bones are osteopenic. Mild arthrosis of the first toe and the midfoot. No radiopaque foreign body. Arterial calcification.           Impression       1. No definite evidence of osteomyelitis. 2. Atherosclerotic disease. ASSESSMENT/PLAN    1.  Partial-thickness ulceration dorsum, left foot likely 2/2 fluid overload and CHF - improving. 2.  Venous stasis dermatitis, B/L likely 2/2 fluid overload    -Patient seen and examined this a.m. at bedside  -VSS afebrile, no leukocytosis noted.   -X-rays reviewed LLE, no signs of soft tissue emphysema or cortical destruction; bones were noted to be

## 2020-01-19 NOTE — PLAN OF CARE
Problem: Risk for Impaired Skin Integrity  Goal: Tissue integrity - skin and mucous membranes  Outcome: Ongoing  Note:   Patient has multiple skin abrasions and breakdown to buttocks. Patient is on speciality mattress and is turn q 2 hours and as needed. Venelex ointment applied to buttocks. Patient has schneider and schneider care was preformed during shift. Prevalon boots on. Will continue to monitor for skin breakdown. Problem: HEMODYNAMIC STATUS  Goal: Patient has stable vital signs and fluid balance  Outcome: Ongoing  Note:   VSS Will continue to monitor. Problem: Pain:  Description  Pain management should include both nonpharmacologic and pharmacologic interventions. Goal: Pain level will decrease  Outcome: Ongoing  Note:   Patient denies pain at this time. Will continue to monitor. Problem: Falls - Risk of:  Goal: Will remain free from falls  Note:   Pt at risk for falls. Free from falls this shift. Fall risk protocol in place. Bed in low position. Bed alarm and bed brakes in place. Fall risk sign posted . Patient in bed , side rails up x 2 and call light in reach. Will continue to monitor safety during hourly rounding.

## 2020-01-19 NOTE — PROGRESS NOTES
Hospitalist Progress Note      PCP: INES BLANDON [address]    Date of Admission: 1/13/2020    Chief Complaint: Shortness of breath    Hospital Course: This 67-year-old male with a past medical history of coronary artery disease status post CABG, chronic back pain pain, status post intrathecal baclofen pump, A. fib, BPH, chronic kidney disease, venous stasis bilateral lower extremity admitted with a shortness of breath acute on chronic CHF, bilateral lower extremity wound podiatry consulted and following       Subjective: Patient denies any chest pain no shortness of breath no nausea vomiting he lives with his daughter who works does not want to go home today, wants to go home tomorrow. Medications:  Reviewed    Infusion Medications   Scheduled Medications    isosorbide mononitrate  30 mg Oral Daily    VENELEX   Topical BID    zolpidem  10 mg Oral Nightly    famotidine  20 mg Oral BID    gabapentin  1,800 mg Oral Nightly    gabapentin  1,200 mg Oral Lunch    atorvastatin  80 mg Oral Nightly    aspirin  325 mg Oral Nightly    sodium chloride flush  10 mL Intravenous 2 times per day    enoxaparin  40 mg Subcutaneous Daily     PRN Meds: sodium chloride flush, magnesium hydroxide, ondansetron, acetaminophen, ammonium lactate, melatonin      Intake/Output Summary (Last 24 hours) at 1/19/2020 0931  Last data filed at 1/19/2020 0930  Gross per 24 hour   Intake 960 ml   Output 2200 ml   Net -1240 ml       Physical Exam Performed:    /66   Pulse 66   Temp 96.4 °F (35.8 °C) (Axillary)   Resp 16   Ht 5' 11\" (1.803 m)   Wt 177 lb 8 oz (80.5 kg)   SpO2 96%   BMI 24.76 kg/m²     General appearance: No apparent distress, appears stated age and cooperative. HEENT: Pupils equal, round, and reactive to light. Conjunctivae/corneas clear. Neck: Supple, with full range of motion. No jugular venous distention. Trachea midline. Respiratory:  Normal respiratory effort.  Clear to auscultation, bilaterally without Rales/Wheezes/Rhonchi. Cardiovascular: Regular rate and rhythm with normal S1/S2 without murmurs, rubs or gallops. Abdomen: Soft, non-tender, non-distended with normal bowel sounds. Musculoskeletal: No clubbing, cyanosis or edema bilaterally. Full range of motion without deformity. Skin: Skin color, texture, turgor normal.  No rashes or lesions. Neurologic:  Neurovascularly intact without any focal sensory/motor deficits. Cranial nerves: II-XII intact, grossly non-focal.  Psychiatric: Alert and oriented, thought content appropriate, normal insight  Capillary Refill: Brisk,< 3 seconds   Peripheral Pulses: +2 palpable, equal bilaterally       Labs:   No results for input(s): WBC, HGB, HCT, PLT in the last 72 hours. Recent Labs     01/17/20  0646 01/18/20  0735 01/19/20  0812    138 139   K 5.0 5.3* 5.2*    105 104   CO2 23 22 24   BUN 42* 36* 31*   CREATININE 1.0 0.9 1.0   CALCIUM 9.0 8.7 9.2     No results for input(s): AST, ALT, BILIDIR, BILITOT, ALKPHOS in the last 72 hours. No results for input(s): INR in the last 72 hours. No results for input(s): Erven Sell in the last 72 hours. Urinalysis:      Lab Results   Component Value Date    NITRU POSITIVE 01/13/2020    WBCUA  01/13/2020    BACTERIA 4+ 01/13/2020    RBCUA 0-2 01/13/2020    BLOODU SMALL 01/13/2020    SPECGRAV >=1.030 01/13/2020    GLUCOSEU Negative 01/13/2020       Radiology:  XR FOOT LEFT (MIN 3 VIEWS)   Final Result      1. No definite evidence of osteomyelitis. 2. Atherosclerotic disease. XR CHEST STANDARD (2 VW)   Final Result   1. Consolidative changes identified within the posterior medial aspect the left lower lobe correspond to rounded atelectatic changes on chest CT dated 9/13/2019.   2. Small left pleural effusion. 3. Hiatal hernia.                Assessment/Plan:    Active Hospital Problems    Diagnosis    Dyspnea [R06.00]    Bradycardia [R00.1]    CHF with unknown LVEF ([address] [I50.9]

## 2020-01-20 VITALS
WEIGHT: 177.25 LBS | HEIGHT: 71 IN | TEMPERATURE: 97.5 F | BODY MASS INDEX: 24.81 KG/M2 | HEART RATE: 71 BPM | DIASTOLIC BLOOD PRESSURE: 63 MMHG | RESPIRATION RATE: 18 BRPM | SYSTOLIC BLOOD PRESSURE: 130 MMHG | OXYGEN SATURATION: 97 %

## 2020-01-20 LAB
ANION GAP SERPL CALCULATED.3IONS-SCNC: 11 MMOL/L (ref 3–16)
BUN BLDV-MCNC: 28 MG/DL (ref 7–20)
CALCIUM SERPL-MCNC: 9.4 MG/DL (ref 8.3–10.6)
CHLORIDE BLD-SCNC: 108 MMOL/L (ref 99–110)
CO2: 23 MMOL/L (ref 21–32)
CREAT SERPL-MCNC: 0.8 MG/DL (ref 0.8–1.3)
GFR AFRICAN AMERICAN: >60
GFR NON-AFRICAN AMERICAN: >60
GLUCOSE BLD-MCNC: 85 MG/DL (ref 70–99)
POTASSIUM REFLEX MAGNESIUM: 5.9 MMOL/L (ref 3.5–5.1)
SODIUM BLD-SCNC: 142 MMOL/L (ref 136–145)

## 2020-01-20 PROCEDURE — 6360000002 HC RX W HCPCS: Performed by: INTERNAL MEDICINE

## 2020-01-20 PROCEDURE — 6370000000 HC RX 637 (ALT 250 FOR IP): Performed by: INTERNAL MEDICINE

## 2020-01-20 PROCEDURE — 97110 THERAPEUTIC EXERCISES: CPT

## 2020-01-20 PROCEDURE — 36415 COLL VENOUS BLD VENIPUNCTURE: CPT

## 2020-01-20 PROCEDURE — 2580000003 HC RX 258: Performed by: INTERNAL MEDICINE

## 2020-01-20 PROCEDURE — 99232 SBSQ HOSP IP/OBS MODERATE 35: CPT | Performed by: INTERNAL MEDICINE

## 2020-01-20 PROCEDURE — 80048 BASIC METABOLIC PNL TOTAL CA: CPT

## 2020-01-20 RX ORDER — SODIUM POLYSTYRENE SULFONATE 15 G/60ML
30 SUSPENSION ORAL; RECTAL ONCE
Status: COMPLETED | OUTPATIENT
Start: 2020-01-20 | End: 2020-01-20

## 2020-01-20 RX ADMIN — CASTOR OIL AND BALSAM, PERU: 788; 87 OINTMENT TOPICAL at 10:06

## 2020-01-20 RX ADMIN — ENOXAPARIN SODIUM 40 MG: 40 INJECTION SUBCUTANEOUS at 10:06

## 2020-01-20 RX ADMIN — SODIUM POLYSTYRENE SULFONATE 30 G: 15 SUSPENSION ORAL; RECTAL at 10:07

## 2020-01-20 RX ADMIN — ISOSORBIDE MONONITRATE 30 MG: 30 TABLET, EXTENDED RELEASE ORAL at 10:06

## 2020-01-20 RX ADMIN — FAMOTIDINE 20 MG: 20 TABLET ORAL at 10:06

## 2020-01-20 RX ADMIN — Medication 10 ML: at 10:08

## 2020-01-20 NOTE — CARE COORDINATION
discharge, or pharmacy can deliver to the bedside if staff is available. (payment due at time of pick-up or delivery - cash, check, or card accepted)     Able to afford home medications/ co-pay costs: Yes    ADLS:  Current PT AM-PAC Score: 13 /24  Current OT AM-PAC Score: 17 /24      Discharge Disposition: Home with [address]: [facility]      LOC at discharge: Not Applicable  AZRA Completed: Yes    Notification completed in HENS/PAS?:  Not Applicable    IMM Completed:   Yes, Case management has presented and reviewed IMM letter #2 to the patient and/or family/ POA. Patient and/or family/POA verbalized understanding of their medicare rights and appeal process if needed. Patient and/or family/POA has signed, initialed and placed today's date (1/20) and time (11:43 am) on IMM letter #2 on the the appropriate lines. Patient and/or family/POA, copy of letter offered and they are aware that this original copy of IMM letter #2 is available prior to discharge from the paper chart on the unit. Electronic documentation has been entered into epic for IMM letter #2 and original paper copy has been added to the paper chart at the nurses station. Transportation:  Transportation Plan for discharge: EMS transportation   Mode of Transport: Ambulance stretcher - S    Reason for medical transport: Bed confined: Meets the following criteria 1) unable to get out of bed without assistance or ambulate, 2) unable to safely sit up in a wheelchair, 3) unable to maintain erect seating position in a chair for time needed for transport and Other: Poor Trunk control, can not ambulate and can only transfer at baseline.    Name of [address]: [address]  Phone: [phone]  Transport Time: 5:00 pm     Transportation form completed: Yes    Home Care:  [address] ordered at discharge: Yes  [address]: [employer]  Phone: [phone]  Fax: [phone]  Orders faxed: Yes    Durable Medical Equipment:  DME

## 2020-01-20 NOTE — CARE COORDINATION
Patient has very poor trunk control and is sturggling to set up strait in bed at this time. Patient open to medical transport. SW provided contact information to patient/family and placed information on white board in room should needs arise. No other needs noted at this time. Fer Schirmer and his family were provided with choice of provider; he and his family are in agreement with the discharge plan.     Care Transition Patient: Sharmila Aguilar Smoker Day, MSW  [facility]  Case Management Department  Ph: [phone]  Fax: [phone]

## 2020-01-20 NOTE — PROGRESS NOTES
Occupational Therapy  Facility/Department: [address]  Daily Treatment Note  NAME: Temo Stephen  : 1940  MRN: [id number]    Date of Service: 2020    Discharge Recommendations: Temo Stephen scored a 17/24 on the AM-PAC ADL Inpatient form. Current research shows that an AM-PAC score of 17 or less is typically not associated with a discharge to the patient's home setting. Based on the patients AM-PAC score and their current ADL deficits, it is recommended that the patient have 3-5 sessions per week of Occupational Therapy at d/c to increase the patients independence. OT Equipment Recommendations  Equipment Needed: No    Assessment   Performance deficits / Impairments: Decreased functional mobility ; Decreased ADL status; Decreased safe awareness;Decreased balance;Decreased strength  Assessment: Pt session limited d/t patient decreased motivation to get up out of bed. Pt completed BUE HEP sitting EOB to increase activity tolerance and BUE strength required for functional transfers. Pt demo decreased insight into deficits, decreased activity tolerance and increased need for assistance with functional transfers resulting in safety concerns about pt d/c home alone as pt is not independent with functional transfers at this time. Pt will benefit from skilled OT after discharge to maximize functional independence and safety. Cont per POC. Treatment Diagnosis: impaired ADLs/transfers  OT Education: OT Role;Plan of Care;Transfer Training;Home Exercise Program  Patient Education: Pt verb understanding  REQUIRES OT FOLLOW UP: Yes  Activity Tolerance  Activity Tolerance: Patient Tolerated treatment well  Safety Devices  Safety Devices in place: Yes  Type of devices: Bed alarm in place; Left in bed;Call light within reach         Patient Diagnosis(es): The primary encounter diagnosis was Dyspnea, unspecified type. A diagnosis of Elevated troponin was also pertinent to this visit.       has a past medical history of BPH (benign prostatic hyperplasia), Carotid stenosis, Cellulitis, Chronic back pain, Diverticular disease, Erectile dysfunction, GERD (gastroesophageal reflux disease), History of atrial fibrillation, Hypercholesteremia, Hypertension, Lower GI bleed, MDRO (multiple drug resistant organisms) resistance, Neuromuscular disorder ([facility] [address] Renal insufficiency, Risk for falls, Tinea corporis, and Vitamin B12 deficiency. has a past surgical history that includes back surgery (2006); Foot surgery; Coronary artery bypass graft (12/13/2013); hip surgery (Right, 5/1/2015); and Cystoscopy (N/A, 1/10/2019). Restrictions  Position Activity Restriction  Other position/activity restrictions: up with A prn  Subjective   General  Chart Reviewed: Yes  Additional Pertinent Hx: 67 y.o. M to ED w/ c/o SOB. Hospital Course:  L foot xray: neg; CXR: Consolidative changes identified within the posterior medial aspect the left lower lobe; Podiatry was consulted for bilateral lower extremity wounds. PMH:  CAD s/p CABG, chronic back pain with intrathecal baclofen pump, Afib, BPH., CKD, venous stasis of BLEs. Family / Caregiver Present: No  Referring Practitioner: Debora Rodrigues MD  Diagnosis: CHF  Subjective  Subjective: Supine in bed upon entry. Declining getting up in chair despite activity endorsement, but agreeable to bedside therex and bed mobility exercises.    Vital Signs  Patient Currently in Pain: No   Orientation  Orientation  Overall Orientation Status: Within Functional Limits  Objective    ADL  Feeding: Independent(finishing lunch upon entry)           Bed mobility  Rolling to Left: Supervision(HOB flat, use of bedrail)  Rolling to Right: Supervision(HOB flat, use of bedrail)  Supine to Sit: Stand by assistance(HOB elevated with use of bedrail)  Sit to Supine: Minimal assistance(assist for management of BLEs)                          Cognition  Arousal/Alertness: Appropriate responses to stimuli  Following Commands:  Follows one step commands consistently  Attention Span: Appears intact  Safety Judgement: Decreased awareness of need for safety;Decreased awareness of need for assistance  Insights: Decreased awareness of deficits                    Type of ROM/Therapeutic Exercise  Type of ROM/Therapeutic Exercise: Resistive Bands  Comment: Pt completed x 12 of the following exercises sitting EOB using light resistance (yellow) theraband: elbow flexion, elbow extension, forward punches, sh flexion, sh h abduction with VCs for pacing and correct body mechanics after demonstration                    Plan   Plan  Times per week: 2-5  Times per day: Daily  Current Treatment Recommendations: Strengthening, Self-Care / ADL, Safety Education & Training, Endurance Training  G-Code     OutComes Score                                                  AM-PAC Score        AM-[facility] Inpatient Daily Activity Raw Score: 17 (01/20/20 1451)  -PAC Inpatient ADL T-Scale Score : 37.26 (01/20/20 1451)  ADL Inpatient CMS 0-100% Score: 50.11 (01/20/20 1451)  ADL Inpatient CMS G-Code Modifier : CK (01/20/20 1451)    Goals  Short term goals  Time Frame for Short term goals: Discharge  Short term goal 1: BSC transfer w/ SBA-ongoing  Short term goal 2: 2 sets of 5 - chair pushups for increased independence w/ transfers-ongoing  Patient Goals   Patient goals : to return home at discharge       Therapy Time   Individual Concurrent Group Co-treatment   Time In 1325         Time Out 1342         Minutes 17         Timed Code Treatment Minutes: Ian Schaefer OT

## 2020-01-20 NOTE — PLAN OF CARE
Problem: Risk for Impaired Skin Integrity  Goal: Tissue integrity - skin and mucous membranes  Outcome: Ongoing  Note:   Patient has multiple skin abrasions and breakdown to buttocks. Patient is on speciality mattress and is turn q 2 hours and as needed. Venelex ointment applied to buttocks. Patient has schneider and schneider care . Mepilex dressing to left foot. Podiatry treating and changing. Prevalon boots on. Will continue to monitor for skin breakdown. Problem: HEMODYNAMIC STATUS  Goal: Patient has stable vital signs and fluid balance  Outcome: Ongoing  Note:   VSS Will continue to monitor. Problem: Pain:  Description  Pain management should include both nonpharmacologic and pharmacologic interventions. Goal: Pain level will decrease  Outcome: Ongoing  Note:   Patient denies pain at this time. Will continue to monitor. Problem: Falls - Risk of:  Goal: Will remain free from falls  Note:   Pt at risk for falls. Fall risk protocol in place. Bed in low position. Bed alarm and bed brakes in place. Fall risk sign posted . Patient in bed , side rails up x 2 and call light in reach. Will continue to monitor safety during hourly rounding.

## 2020-01-20 NOTE — DISCHARGE SUMMARY
the posterior medial aspect the left lower lobe correspond to rounded atelectatic changes on chest CT dated 9/13/2019.   2. Small left pleural effusion. 3. Hiatal hernia. Disposition:  Home with HH     Condition at Discharge: Stable    Discharge Instructions/Follow-up:  PCP    Code Status:  Full Code     Activity: activity as tolerated    Diet: regular diet      Labs: For convenience and continuity at follow-up the following most recent labs are provided:      CBC:    Lab Results   Component Value Date    WBC 5.3 01/16/2020    HGB 12.0 01/16/2020    HCT 37.0 01/16/2020     01/16/2020       Renal:    Lab Results   Component Value Date     01/20/2020    K 5.9 01/20/2020     01/20/2020    CO2 23 01/20/2020    BUN 28 01/20/2020    CREATININE 0.8 01/20/2020    CALCIUM 9.4 01/20/2020    PHOS 4.3 01/14/2020       Discharge Medications:     Current Discharge Medication List           Details   isosorbide mononitrate (IMDUR) 30 MG extended release tablet Take 1 tablet by mouth daily  Qty: 30 tablet, Refills: 3              Details   ranitidine (ZANTAC) 300 MG tablet Take 300 mg by mouth nightly      !! gabapentin (NEURONTIN) 600 MG tablet Take 1,200 mg by mouth Daily with lunch. .      !! gabapentin (NEURONTIN) 600 MG tablet Take 1,800 mg by mouth nightly. .      Melatonin 10 MG TABS Take 10 mg by mouth nightly       ferrous sulfate 325 (65 Fe) MG tablet Take 325 mg by mouth daily (with breakfast)      niacin 500 MG tablet Take 1 tablet by mouth daily as needed       aspirin 325 MG tablet Take 325 mg by mouth nightly       !! gabapentin (NEURONTIN) 600 MG tablet Take 600 mg by mouth daily. Arian Maradiaga atorvastatin (LIPITOR) 80 MG tablet Take 80 mg by mouth nightly. zolpidem (AMBIEN) 10 MG tablet Take 10 mg by mouth nightly. !! - Potential duplicate medications found. Please discuss with provider. No future appointments.     Time Spent on discharge is more than 30 minutes in the examination, evaluation, counseling and review of medications and discharge plan. Signed:    Анна Valdez MD   1/20/2020      Thank you INES Maguire for the opportunity to be involved in this patient's care. If you have any questions or concerns please feel free to contact me at [phone].

## 2020-01-20 NOTE — PROGRESS NOTES
Podiatric Surgery Daily Progress Note  Mirnadora Presleymario Mckayla  Subjective :   Patient seen and examined at bedside this a.m. Patient denies any acute overnight events. Patient denies N/V/F/C/SOB. Patient reports he is going home. Patient denies calf pain, thigh pain, chest pain. Review of Systems: A 12 point review of symptoms is unremarkable with the exception of the chief complaint. Patient specifically denies nausea, fever, vomiting, chills, shortness of breath, chest pain, abdominal pain, constipation or difficulty urinating. Objective     BP (!) 155/70   Pulse 64   Temp 97.3 °F (36.3 °C) (Oral)   Resp 18   Ht 5' 11\" (1.803 m)   Wt 177 lb 4 oz (80.4 kg)   SpO2 96%   BMI 24.72 kg/m²     I/O:    Intake/Output Summary (Last 24 hours) at 1/20/2020 0810  Last data filed at 1/20/2020 0740  Gross per 24 hour   Intake 1200 ml   Output 3675 ml   Net -2475 ml              Wt Readings from Last 3 Encounters:   01/20/20 177 lb 4 oz (80.4 kg)   12/17/19 188 lb 7.9 oz (85.5 kg)   11/19/19 182 lb 8 oz (82.8 kg)       LABS:    No results for input(s): WBC, HGB, HCT, PLT in the last 72 hours. Recent Labs     01/20/20  0659      K 5.9*      CO2 23   BUN 28*   CREATININE 0.8      No results for input(s): PROT, INR, APTT in the last 72 hours. LOWER EXTREMITY EXAMINATION    Dressing to bilateral LE intact. No strikethrough drainage noted to the external dressing B/L. No strikethrough drainage noted to the internal dressing RLE. Minimal strikethrough drainage noted to the internal layers of the dressing LLE. VASCULAR: DP and PT pulses are non-palpable b/l.  DP and PT pulses are biphasic via handheld Doppler B/L.  CFT is brisk to the digits of the foot b/l. Skin temperature is warm to cool from proximal to distal with no focal calor noted.   Relaxed skin tension lines noted B/L 2/2 Ace compressive therapy.  No pain with calf compression b/l.     NEUROLOGIC: Gross and epicritic sensation is diminished b/l. Protective sensation is diminished at all pedal sites b/l.     DERMATOLOGIC: Dermatological changes noted to the bilateral lower extremities.  Diffuse xerosis noted to the bilateral lower extremities. Partial thickness wound noted to the dorsum of the left foot and improving clinically each day.  Wound measures 2.0x1.0cm  x superficial.  Wound bed is grandular, the previoiusly noted area is now completely epithelized with new tissue.  Wound does not probe, track, or undermine.  No drainage or malodor noted.  No fluctuance or crepitus noted. No signs of infection noted.   Periwound intact epithelialized tissue.  Multiple superficial excoriations noted to the anterior aspect of the bilateral lower extremities. No drainage, malodor or signs of infection noted. Right lower extremity no open wounds or signs of infection noted. Improving edematous lower extremity 2/2 Ace compressive therapy. Picture's taken on 1/17/2020            Patient gave verbal consent for the picture taken at today's visit. MUSCULOSKELETAL: Muscle strength is 4/5 for all pedal groups tested. No pain with palpation of the foot or ankle b/l. Ankle joint ROM is decreased in dorsiflexion with the knee extended. IMAGING:  Narrative   EXAM: Left foot 3 views       INDICATION: open wound; Rule out OM,       COMPARISON: None       FINDINGS:       Bones are osteopenic. Mild arthrosis of the first toe and the midfoot. No radiopaque foreign body. Arterial calcification.           Impression       1. No definite evidence of osteomyelitis. 2. Atherosclerotic disease. ASSESSMENT/PLAN    1.  Partial-thickness ulceration dorsum, left foot likely 2/2 fluid overload and CHF - improving. 2.  Venous stasis dermatitis, B/L likely 2/2 fluid overload    -Patient seen and examined this a.m. at bedside  -VSS afebrile, no leukocytosis noted.   -X-rays reviewed LLE, no signs of soft tissue emphysema or cortical destruction; bones

## 2020-01-20 NOTE — PROGRESS NOTES
[address]  CARDIOLOGY  Progress Note        Reason for Consultation/Chief Complaint: \"I have been having SOB. \"       CC:  Follow up SOB     History of Present Illness:  Cyndy Gomes is a 67 y.o. patient and is resting comfortably after lunch today         Home Medications:  Were reviewed and are listed in nursing record. and/or listed below  Prior to Admission medications    Medication Sig Start Date End Date Taking? Authorizing Provider   isosorbide mononitrate (IMDUR) 30 MG extended release tablet Take 1 tablet by mouth daily 1/17/20  Yes Isaac Hernandez MD   ranitidine (ZANTAC) 300 MG tablet Take 300 mg by mouth nightly   Yes Historical Provider, MD   gabapentin (NEURONTIN) 600 MG tablet Take 1,200 mg by mouth Daily with lunch. .   Yes Historical Provider, MD   gabapentin (NEURONTIN) 600 MG tablet Take 1,800 mg by mouth nightly. .   Yes Historical Provider, MD   Melatonin 10 MG TABS Take 10 mg by mouth nightly    Yes Historical Provider, MD   ferrous sulfate 325 (65 Fe) MG tablet Take 325 mg by mouth daily (with breakfast)   Yes Historical Provider, MD   niacin 500 MG tablet Take 1 tablet by mouth daily as needed    Yes Historical Provider, MD   aspirin 325 MG tablet Take 325 mg by mouth nightly    Yes Historical Provider, MD   gabapentin (NEURONTIN) 600 MG tablet Take 600 mg by mouth daily. .   Yes Historical Provider, MD   atorvastatin (LIPITOR) 80 MG tablet Take 80 mg by mouth nightly. Yes Historical Provider, MD   zolpidem (AMBIEN) 10 MG tablet Take 10 mg by mouth nightly.     Yes Historical Provider, MD        Hospital Medications:   isosorbide mononitrate  30 mg Oral Daily    VENELEX   Topical BID    zolpidem  10 mg Oral Nightly    famotidine  20 mg Oral BID    gabapentin  1,800 mg Oral Nightly    gabapentin  1,200 mg Oral Lunch    atorvastatin  80 mg Oral Nightly    aspirin  325 mg Oral Nightly    sodium chloride flush  10 mL Intravenous 2 times per day    enoxaparin  40 mg Subcutaneous Daily     sodium chloride flush, magnesium hydroxide, ondansetron, acetaminophen, ammonium lactate, melatonin          Physical Examination:    Vitals:    01/20/20 1552   BP: 130/63   Pulse: 71   Resp: 18   Temp: 97.5 °F (36.4 °C)   SpO2: 97%    Weight: 177 lb 4 oz (80.4 kg)         General Appearance:  Alert, cooperative, no distress, appears stated age   Head:  Normocephalic, without obvious abnormality, atraumatic   Eyes:  PERRL   Nose: Nares normal,   Neck: Supple, JVP normal   Lungs:   Clear to auscultation bilaterally, respirations unlabored   Chest Wall:  No tenderness or deformity   Heart:  bradycardic rate and rhythm, normal S1, S2 normal, no murmur, II/VI HSM  No rub. No S3 / S4  gallop   Abdomen:   Soft, non-tender, +bowel sounds   Extremities: no cyanosis, no clubbing , Trace LE edema   Pulses: Diminished  Feel wrapped   Skin: LEs wrapped. Wounds noted in chart. Pysch: Normal mood and affect   Neurologic: No gross deficits. CN II - XII grossly intact        Labs  CBC:   Lab Results   Component Value Date    WBC 5.3 01/16/2020    RBC 3.71 01/16/2020    HGB 12.0 01/16/2020    HCT 37.0 01/16/2020    MCV 99.7 01/16/2020    RDW 15.3 01/16/2020     01/16/2020     CMP:    Lab Results   Component Value Date     01/20/2020    K 5.9 01/20/2020     01/20/2020    CO2 23 01/20/2020    BUN 28 01/20/2020    CREATININE 0.8 01/20/2020    GFRAA >60 01/20/2020    AGRATIO 0.8 11/19/2019    LABGLOM >60 01/20/2020    GLUCOSE 85 01/20/2020    PROT 7.2 11/19/2019    CALCIUM 9.4 01/20/2020    BILITOT <0.2 11/19/2019    ALKPHOS 126 11/19/2019    AST 14 11/19/2019    ALT 16 11/19/2019     PT/INR:  No results found for: PTINR  No results for input(s): CKTOTAL, CKMB, TROPONINI in the last 72 hours. EKG:  SB and no acute changes.      Assessment  Patient Active Problem List   Diagnosis    Hypotension    Light headed    Cellulitis    Essential hypertension    GERD (gastroesophageal reflux disease)    Acute blood loss anemia    Tinea corporis    Carotid stenosis    CAD (coronary artery disease)    S/P CABG x 5    Lower GI bleeding    Hip fracture, right (HCC)    Acute right hip pain    Acute low back pain without sciatica    Hypothermia    Complicated UTI (urinary tract infection)    [facility]IS [facility] kidney injury) [facility] [address]    Edema    Problem with urinary catheter (HCC)    Acute encephalopathy    Chronic indwelling Iglesias catheter    Hyperlipidemia    Bilateral lower leg cellulitis    Neurogenic bladder    Bacteriuria    Abnormality of urethral meatus    Gross hematuria    CHF with unknown LVEF ([facility])    Dyspnea    Bradycardia       Ordering Physician Mark Chen MD        Physician           Charmayne Pilar, MD     Procedure     Type of Study      TTE procedure:ECHOCARDIOGRAM COMPLETE 2D W DOPPLER W COLOR. Procedure Date  Date: 01/14/2020 Start: 11:24 AM     Study Location: [address] Echo Lab  Technical Quality: Limited visualization     Indications:Congestive heart failure.     Patient Status: Routine     Height: 71 inches Weight: 180 pounds BSA: 2.02 m2 BMI: 25.11 kg/m2     BP: 130/48 mmHg      Conclusions      Summary   Normal left ventricle size. There is mild concentric left ventricular   hypertrophy. Overall left ventricular systolic function appears normal with   an ejection fraction of 55-60%. No regional wall motion abnormalities are   noted. Diastolic filling parameters suggests normal diastolic function. Trace mitral and tricuspid regurgitation is present. Estimated pulmonary artery systolic pressure is 30 mmHg assuming a right   atrial pressure of 3 mmHg. Biatrial enlargement.       Signature      ------------------------------------------------------------------   Electronically signed by Cole Lin MD   (Interpreting physician) on 01/14/2020 at 02:32 PM ------------------------------------------------------------------      Findings      Left Ventricle   Normal left ventricle size. There is mild concentric left ventricular   hypertrophy. Overall left ventricular systolic function appears normal with   an ejection fraction of 55-60%. No regional wall motion abnormalities are   noted. Diastolic filling parameters suggests normal diastolic function. Mitral Valve   Mitral valve is structurally normal.   Trace mitral regurgitation is present. No evidence of mitral valve stenosis. Left Atrium   The left atrium is mildly dilated. Aortic Valve   The aortic valve appears tricuspid. The non coronary aortic valve leaflet appears calcific/thickened with   restricted mobility. No evidence of aortic valve regurgitation. No evidence of aortic valve stenosis. Aorta   The aortic root is normal in size. Right Ventricle   Normal right ventricular size and function. TAPSE 1.91 cm   RV S velocity 9.14 cm/s      Tricuspid Valve   Tricuspid valve is structurally normal.   Trivial tricuspid regurgitation. Estimated pulmonary artery systolic pressure is 30 mmHg assuming a right   atrial pressure of 3 mmHg. No evidence of tricuspid stenosis. Right Atrium   The right atrium is mildly dilated. Pulmonic Valve   The pulmonic valve is not well visualized. No evidence of pulmonic valve regurgitation. No evidence of pulmonic valve stenosis. Pericardial Effusion   No evidence of any pericardial effusion. Pleural Effusion   There is no pleural effusion. Miscellaneous   IVC not well visualized.      M-Mode/2D Measurements (cm)      LV Diastolic Dimension: 3.76 cm LV Systolic Dimension: 3.78 cm   LV Septum Diastolic: 8.58 cm    LV Septum Systolic: 1.20 cm   LV PW Diastolic: 0.27 cm        LV PW Systolic: 1.14 cm   RV Diastolic Dimension: 8.20 cm AO Root Dimension: 3 cm                                   LA Dimension: 4.6 cm OF PROCEDURE:  At a separate encounter, all risks, benefits and  alternatives of operative intervention were discussed with this patient, and  he agreed to proceed. He was brought to the operating room where general  endotracheal anesthesia was induced by the Anesthesia team without  difficulty. A roll was placed behind the shoulders, and patient was prepped  and draped in a sterile fashion. A purposeful time-out was undertaken,  confirming patient, procedure and antibiotic administration. Once this was accomplished, a median sternotomy was performed simultaneous  with lower extremity endoscopic saphenous vein graft harvest.  The left  internal mammary artery was  harvested from its bed utilizing no-touch  technique. Once satisfactory length, quality and caliber of conduit were  harvested, ACT guided heparinization was undertaken, and the LIMA was divided  distally and tucked in the apex of the left chest.  Pericardium was incised. A pericardial wall was created, and pursestring sutures were placed in the  ascending aorta and right atrium. We cannulated the patient with a Softflo  cannula and 2-staged atrial caval venous cannulas as well as antegrade and  retrograde cardioplegia cannulas uneventfully. We then placed the patient on cardiopulmonary bypass and inspected our  targets which were found to be satisfactory at the distal right coronary  OM-3, OM-1, the diagonal and the LAD. At this point, crossclamp was applied. Cold blood induction antegrade cardioplegia was administered with prompt  cardiac arrest.  We also administered cardioplegia at approximately 20 minute  intervals which consisted of antegrade cardioplegia, retrograde cardioplegia  and cardioplegia down to the vein grafts intermittently. Distal right  coronary arteriotomy was performed, and at the site, anastomosis was  constructed. Hemostasis and runoff were assessed by injection of  blood-impregnated heparinized saline.   We then performed side-to-side jessica  configuration anastomoses at the OM-3, OM-1 and diagonal.  We then brought  the vein proximally to the aorta with the heart filled, and we then cut the  vein to size and performed a 4.0 mm punch aortotomy in an end-to-side  anastomosis with running 6-0 Prolene suture. A lateral incision was made in  the pericardium. The LIMA was brought into the field. LAD arteriotomy  was  performed in an end-to-side manner with running 6-0 Prolene suture. The  pedicle was then tacked to the epicardial surface. At this point, we rewarmed, and the patient was placed in Trendelenburg  position after a parting hot shot of retrograde cardioplegia. We removed the  crossclamp. Normal sinus rhythm was spontaneous, and we inspected our  anastomoses for hemostasis and lay of the graft, all of which were found to  be satisfactory. We then weaned and  from cardiopulmonary bypass. Protamine was administered. MAYA was assessed and found to be satisfactory,  and the patient was decannulated. All cannulation sites were reinforced. We  then inspected the mediastinum for hemostasis which was found to be  satisfactory and closed with simple interrupted sternal wires numbering 7  followed by running 0 Vicryl suture for the linea alba and anterior pectoral  fascia, and 2-0 Vicryl was placed in the deep dermis. Skin was approximated  with running 3-0 Monocryl suture. Sponge, needle and instrument counts were  correct at the end of the procedure. Patient tolerated the procedure well  and was sent to the cardiac surgical unit in good condition. Felicia Cardoso MD     [id number]  DD: 01/13/2014 15:27  DT: 01/13/2014 16:53  Job #: [id number]  CC: Mike Archibald MD  CC: Felicia Cardoso MD  CC: Don Cook MD            Last signed by: Cale Quiroga MD at 1/27/2014  8:55 AM         Impression:  SOB. COPD.   Normal ECG after

## 2020-01-28 ENCOUNTER — FOLLOWUP TELEPHONE ENCOUNTER (OUTPATIENT)
Dept: ICU | Age: 80
End: 2020-01-28

## 2020-02-01 ENCOUNTER — APPOINTMENT (OUTPATIENT)
Dept: GENERAL RADIOLOGY | Age: 80
DRG: 698 | End: 2020-02-01
Payer: MEDICARE

## 2020-02-01 ENCOUNTER — HOSPITAL ENCOUNTER (INPATIENT)
Age: 80
LOS: 12 days | Discharge: NURSING, REHAB OR LONG-TERM CARE | DRG: 698 | End: 2020-02-13
Attending: EMERGENCY MEDICINE | Admitting: INTERNAL MEDICINE
Payer: MEDICARE

## 2020-02-01 PROBLEM — N39.0 UTI (URINARY TRACT INFECTION): Status: ACTIVE | Noted: 2020-02-01

## 2020-02-01 LAB
A/G RATIO: 0.9 (ref 1.1–2.2)
ALBUMIN SERPL-MCNC: 3.5 G/DL (ref 3.4–5)
ALP BLD-CCNC: 254 U/L (ref 40–129)
ALT SERPL-CCNC: 22 U/L (ref 10–40)
AMORPHOUS: ABNORMAL /HPF
ANION GAP SERPL CALCULATED.3IONS-SCNC: 7 MMOL/L (ref 3–16)
AST SERPL-CCNC: 20 U/L (ref 15–37)
BACTERIA: ABNORMAL /HPF
BASOPHILS ABSOLUTE: 0 K/UL (ref 0–0.2)
BASOPHILS RELATIVE PERCENT: 0.7 %
BILIRUB SERPL-MCNC: 0.3 MG/DL (ref 0–1)
BILIRUBIN URINE: NEGATIVE
BLOOD, URINE: ABNORMAL
BUN BLDV-MCNC: 34 MG/DL (ref 7–20)
CALCIUM SERPL-MCNC: 8.8 MG/DL (ref 8.3–10.6)
CHLORIDE BLD-SCNC: 107 MMOL/L (ref 99–110)
CLARITY: ABNORMAL
CO2: 25 MMOL/L (ref 21–32)
COLOR: YELLOW
CREAT SERPL-MCNC: 0.6 MG/DL (ref 0.8–1.3)
EOSINOPHILS ABSOLUTE: 0.3 K/UL (ref 0–0.6)
EOSINOPHILS RELATIVE PERCENT: 4.2 %
EPITHELIAL CELLS, UA: ABNORMAL /HPF
GFR AFRICAN AMERICAN: >60
GFR NON-AFRICAN AMERICAN: >60
GLOBULIN: 3.7 G/DL
GLUCOSE BLD-MCNC: 117 MG/DL (ref 70–99)
GLUCOSE URINE: NEGATIVE MG/DL
HCT VFR BLD CALC: 34.5 % (ref 40.5–52.5)
HEMOGLOBIN: 11.1 G/DL (ref 13.5–17.5)
KETONES, URINE: NEGATIVE MG/DL
LACTIC ACID: 0.9 MMOL/L (ref 0.4–2)
LEUKOCYTE ESTERASE, URINE: ABNORMAL
LYMPHOCYTES ABSOLUTE: 0.8 K/UL (ref 1–5.1)
LYMPHOCYTES RELATIVE PERCENT: 11 %
MCH RBC QN AUTO: 31.6 PG (ref 26–34)
MCHC RBC AUTO-ENTMCNC: 32.3 G/DL (ref 31–36)
MCV RBC AUTO: 98.1 FL (ref 80–100)
MICROSCOPIC EXAMINATION: YES
MONOCYTES ABSOLUTE: 0.5 K/UL (ref 0–1.3)
MONOCYTES RELATIVE PERCENT: 7 %
NEUTROPHILS ABSOLUTE: 5.5 K/UL (ref 1.7–7.7)
NEUTROPHILS RELATIVE PERCENT: 77.1 %
NITRITE, URINE: POSITIVE
PDW BLD-RTO: 15 % (ref 12.4–15.4)
PH UA: 6 (ref 5–8)
PLATELET # BLD: 162 K/UL (ref 135–450)
PMV BLD AUTO: 9.6 FL (ref 5–10.5)
POTASSIUM REFLEX MAGNESIUM: 4.2 MMOL/L (ref 3.5–5.1)
PRO-BNP: 421 PG/ML (ref 0–449)
PROTEIN UA: NEGATIVE MG/DL
RBC # BLD: 3.52 M/UL (ref 4.2–5.9)
RBC UA: ABNORMAL /HPF (ref 0–2)
SODIUM BLD-SCNC: 139 MMOL/L (ref 136–145)
SPECIFIC GRAVITY UA: 1.02 (ref 1–1.03)
TOTAL PROTEIN: 7.2 G/DL (ref 6.4–8.2)
TROPONIN: 0.03 NG/ML
TROPONIN: 0.03 NG/ML
URINE REFLEX TO CULTURE: YES
URINE TYPE: ABNORMAL
UROBILINOGEN, URINE: 1 E.U./DL
WBC # BLD: 7.1 K/UL (ref 4–11)
WBC UA: ABNORMAL /HPF (ref 0–5)

## 2020-02-01 PROCEDURE — 84484 ASSAY OF TROPONIN QUANT: CPT

## 2020-02-01 PROCEDURE — 87186 SC STD MICRODIL/AGAR DIL: CPT

## 2020-02-01 PROCEDURE — 85025 COMPLETE CBC W/AUTO DIFF WBC: CPT

## 2020-02-01 PROCEDURE — 83605 ASSAY OF LACTIC ACID: CPT

## 2020-02-01 PROCEDURE — 80053 COMPREHEN METABOLIC PANEL: CPT

## 2020-02-01 PROCEDURE — 71046 X-RAY EXAM CHEST 2 VIEWS: CPT

## 2020-02-01 PROCEDURE — 93005 ELECTROCARDIOGRAM TRACING: CPT | Performed by: STUDENT IN AN ORGANIZED HEALTH CARE EDUCATION/TRAINING PROGRAM

## 2020-02-01 PROCEDURE — 99284 EMERGENCY DEPT VISIT MOD MDM: CPT

## 2020-02-01 PROCEDURE — 1200000000 HC SEMI PRIVATE

## 2020-02-01 PROCEDURE — 87086 URINE CULTURE/COLONY COUNT: CPT

## 2020-02-01 PROCEDURE — 51702 INSERT TEMP BLADDER CATH: CPT

## 2020-02-01 PROCEDURE — 81001 URINALYSIS AUTO W/SCOPE: CPT

## 2020-02-01 PROCEDURE — 2580000003 HC RX 258: Performed by: STUDENT IN AN ORGANIZED HEALTH CARE EDUCATION/TRAINING PROGRAM

## 2020-02-01 PROCEDURE — 87077 CULTURE AEROBIC IDENTIFY: CPT

## 2020-02-01 PROCEDURE — 83880 ASSAY OF NATRIURETIC PEPTIDE: CPT

## 2020-02-01 PROCEDURE — 6360000002 HC RX W HCPCS: Performed by: STUDENT IN AN ORGANIZED HEALTH CARE EDUCATION/TRAINING PROGRAM

## 2020-02-01 RX ORDER — ACETAMINOPHEN 325 MG/1
650 TABLET ORAL EVERY 4 HOURS PRN
Status: DISCONTINUED | OUTPATIENT
Start: 2020-02-01 | End: 2020-02-13 | Stop reason: HOSPADM

## 2020-02-01 RX ORDER — 0.9 % SODIUM CHLORIDE 0.9 %
500 INTRAVENOUS SOLUTION INTRAVENOUS ONCE
Status: CANCELLED | OUTPATIENT
Start: 2020-02-01 | End: 2020-02-01

## 2020-02-01 RX ORDER — GABAPENTIN 600 MG/1
600 TABLET ORAL DAILY
Status: DISCONTINUED | OUTPATIENT
Start: 2020-02-02 | End: 2020-02-07

## 2020-02-01 RX ORDER — FAMOTIDINE 20 MG/1
20 TABLET, FILM COATED ORAL 2 TIMES DAILY
Status: DISCONTINUED | OUTPATIENT
Start: 2020-02-02 | End: 2020-02-13 | Stop reason: HOSPADM

## 2020-02-01 RX ORDER — GABAPENTIN 600 MG/1
1800 TABLET ORAL NIGHTLY
Status: DISCONTINUED | OUTPATIENT
Start: 2020-02-02 | End: 2020-02-07

## 2020-02-01 RX ORDER — ONDANSETRON 2 MG/ML
4 INJECTION INTRAMUSCULAR; INTRAVENOUS EVERY 6 HOURS PRN
Status: DISCONTINUED | OUTPATIENT
Start: 2020-02-01 | End: 2020-02-13 | Stop reason: HOSPADM

## 2020-02-01 RX ORDER — FERROUS SULFATE 325(65) MG
325 TABLET ORAL
Status: DISCONTINUED | OUTPATIENT
Start: 2020-02-02 | End: 2020-02-13 | Stop reason: HOSPADM

## 2020-02-01 RX ORDER — GABAPENTIN 600 MG/1
1200 TABLET ORAL
Status: DISCONTINUED | OUTPATIENT
Start: 2020-02-02 | End: 2020-02-07

## 2020-02-01 RX ORDER — ASPIRIN 325 MG
325 TABLET ORAL NIGHTLY
Status: DISCONTINUED | OUTPATIENT
Start: 2020-02-02 | End: 2020-02-09

## 2020-02-01 RX ORDER — ZOLPIDEM TARTRATE 5 MG/1
10 TABLET ORAL NIGHTLY
Status: DISCONTINUED | OUTPATIENT
Start: 2020-02-02 | End: 2020-02-03

## 2020-02-01 RX ORDER — SODIUM CHLORIDE 0.9 % (FLUSH) 0.9 %
10 SYRINGE (ML) INJECTION PRN
Status: DISCONTINUED | OUTPATIENT
Start: 2020-02-01 | End: 2020-02-13 | Stop reason: HOSPADM

## 2020-02-01 RX ORDER — ATORVASTATIN CALCIUM 80 MG/1
80 TABLET, FILM COATED ORAL NIGHTLY
Status: DISCONTINUED | OUTPATIENT
Start: 2020-02-02 | End: 2020-02-13 | Stop reason: HOSPADM

## 2020-02-01 RX ORDER — NIACIN 500 MG/1
500 TABLET, EXTENDED RELEASE ORAL NIGHTLY PRN
Status: DISCONTINUED | OUTPATIENT
Start: 2020-02-02 | End: 2020-02-13 | Stop reason: HOSPADM

## 2020-02-01 RX ORDER — UREA 10 %
10 LOTION (ML) TOPICAL NIGHTLY
Status: DISCONTINUED | OUTPATIENT
Start: 2020-02-02 | End: 2020-02-03

## 2020-02-01 RX ORDER — ISOSORBIDE MONONITRATE 30 MG/1
30 TABLET, EXTENDED RELEASE ORAL DAILY
Status: DISCONTINUED | OUTPATIENT
Start: 2020-02-02 | End: 2020-02-13 | Stop reason: HOSPADM

## 2020-02-01 RX ORDER — SODIUM CHLORIDE 0.9 % (FLUSH) 0.9 %
10 SYRINGE (ML) INJECTION EVERY 12 HOURS SCHEDULED
Status: DISCONTINUED | OUTPATIENT
Start: 2020-02-02 | End: 2020-02-13 | Stop reason: HOSPADM

## 2020-02-01 RX ADMIN — PIPERACILLIN AND TAZOBACTAM 4.5 G: 4; .5 INJECTION, POWDER, FOR SOLUTION INTRAVENOUS at 22:50

## 2020-02-01 ASSESSMENT — ENCOUNTER SYMPTOMS
WHEEZING: 0
COUGH: 0
SHORTNESS OF BREATH: 0
CHEST TIGHTNESS: 0
CHOKING: 0
BACK PAIN: 1

## 2020-02-01 ASSESSMENT — PAIN SCALES - GENERAL: PAINLEVEL_OUTOF10: 0

## 2020-02-02 LAB
ANION GAP SERPL CALCULATED.3IONS-SCNC: 12 MMOL/L (ref 3–16)
BASOPHILS ABSOLUTE: 0 K/UL (ref 0–0.2)
BASOPHILS RELATIVE PERCENT: 0.6 %
BUN BLDV-MCNC: 35 MG/DL (ref 7–20)
CALCIUM SERPL-MCNC: 8.4 MG/DL (ref 8.3–10.6)
CHLORIDE BLD-SCNC: 107 MMOL/L (ref 99–110)
CO2: 21 MMOL/L (ref 21–32)
CREAT SERPL-MCNC: 0.8 MG/DL (ref 0.8–1.3)
EKG ATRIAL RATE: 60 BPM
EKG DIAGNOSIS: NORMAL
EKG P AXIS: 30 DEGREES
EKG P-R INTERVAL: 218 MS
EKG Q-T INTERVAL: 418 MS
EKG QRS DURATION: 86 MS
EKG QTC CALCULATION (BAZETT): 418 MS
EKG R AXIS: 49 DEGREES
EKG T AXIS: 68 DEGREES
EKG VENTRICULAR RATE: 60 BPM
EOSINOPHILS ABSOLUTE: 0.3 K/UL (ref 0–0.6)
EOSINOPHILS RELATIVE PERCENT: 5.2 %
GFR AFRICAN AMERICAN: >60
GFR NON-AFRICAN AMERICAN: >60
GLUCOSE BLD-MCNC: 139 MG/DL (ref 70–99)
HCT VFR BLD CALC: 29.6 % (ref 40.5–52.5)
HEMOGLOBIN: 9.8 G/DL (ref 13.5–17.5)
LYMPHOCYTES ABSOLUTE: 0.9 K/UL (ref 1–5.1)
LYMPHOCYTES RELATIVE PERCENT: 14.6 %
MCH RBC QN AUTO: 32.4 PG (ref 26–34)
MCHC RBC AUTO-ENTMCNC: 33.2 G/DL (ref 31–36)
MCV RBC AUTO: 97.5 FL (ref 80–100)
MONOCYTES ABSOLUTE: 0.5 K/UL (ref 0–1.3)
MONOCYTES RELATIVE PERCENT: 7.9 %
NEUTROPHILS ABSOLUTE: 4.2 K/UL (ref 1.7–7.7)
NEUTROPHILS RELATIVE PERCENT: 71.7 %
PDW BLD-RTO: 15 % (ref 12.4–15.4)
PLATELET # BLD: 142 K/UL (ref 135–450)
PMV BLD AUTO: 9.8 FL (ref 5–10.5)
POTASSIUM REFLEX MAGNESIUM: 4.5 MMOL/L (ref 3.5–5.1)
RBC # BLD: 3.04 M/UL (ref 4.2–5.9)
SODIUM BLD-SCNC: 140 MMOL/L (ref 136–145)
WBC # BLD: 5.9 K/UL (ref 4–11)

## 2020-02-02 PROCEDURE — 6370000000 HC RX 637 (ALT 250 FOR IP): Performed by: STUDENT IN AN ORGANIZED HEALTH CARE EDUCATION/TRAINING PROGRAM

## 2020-02-02 PROCEDURE — 36415 COLL VENOUS BLD VENIPUNCTURE: CPT

## 2020-02-02 PROCEDURE — 1200000000 HC SEMI PRIVATE

## 2020-02-02 PROCEDURE — 6370000000 HC RX 637 (ALT 250 FOR IP): Performed by: UROLOGY

## 2020-02-02 PROCEDURE — 80048 BASIC METABOLIC PNL TOTAL CA: CPT

## 2020-02-02 PROCEDURE — 85025 COMPLETE CBC W/AUTO DIFF WBC: CPT

## 2020-02-02 PROCEDURE — 6360000002 HC RX W HCPCS: Performed by: STUDENT IN AN ORGANIZED HEALTH CARE EDUCATION/TRAINING PROGRAM

## 2020-02-02 PROCEDURE — 87040 BLOOD CULTURE FOR BACTERIA: CPT

## 2020-02-02 PROCEDURE — 2580000003 HC RX 258: Performed by: STUDENT IN AN ORGANIZED HEALTH CARE EDUCATION/TRAINING PROGRAM

## 2020-02-02 RX ORDER — BETHANECHOL CHLORIDE 10 MG/1
25 TABLET ORAL 3 TIMES DAILY
Status: DISCONTINUED | OUTPATIENT
Start: 2020-02-02 | End: 2020-02-07

## 2020-02-02 RX ORDER — TAMSULOSIN HYDROCHLORIDE 0.4 MG/1
0.4 CAPSULE ORAL DAILY
Status: DISCONTINUED | OUTPATIENT
Start: 2020-02-02 | End: 2020-02-13 | Stop reason: HOSPADM

## 2020-02-02 RX ADMIN — TAMSULOSIN HYDROCHLORIDE 0.4 MG: 0.4 CAPSULE ORAL at 14:06

## 2020-02-02 RX ADMIN — PIPERACILLIN AND TAZOBACTAM 3.38 G: 3; .375 INJECTION, POWDER, LYOPHILIZED, FOR SOLUTION INTRAVENOUS at 21:43

## 2020-02-02 RX ADMIN — Medication 10 ML: at 21:44

## 2020-02-02 RX ADMIN — ATORVASTATIN CALCIUM 80 MG: 80 TABLET, FILM COATED ORAL at 00:37

## 2020-02-02 RX ADMIN — ZOLPIDEM TARTRATE 10 MG: 5 TABLET ORAL at 00:36

## 2020-02-02 RX ADMIN — FERROUS SULFATE TAB 325 MG (65 MG ELEMENTAL FE) 325 MG: 325 (65 FE) TAB at 09:04

## 2020-02-02 RX ADMIN — GABAPENTIN 1800 MG: 600 TABLET, FILM COATED ORAL at 00:36

## 2020-02-02 RX ADMIN — ISOSORBIDE MONONITRATE 30 MG: 30 TABLET, EXTENDED RELEASE ORAL at 09:04

## 2020-02-02 RX ADMIN — PIPERACILLIN AND TAZOBACTAM 3.38 G: 3; .375 INJECTION, POWDER, LYOPHILIZED, FOR SOLUTION INTRAVENOUS at 14:06

## 2020-02-02 RX ADMIN — FAMOTIDINE 20 MG: 20 TABLET ORAL at 09:04

## 2020-02-02 RX ADMIN — BETHANECHOL CHLORIDE 25 MG: 10 TABLET ORAL at 21:41

## 2020-02-02 RX ADMIN — ENOXAPARIN SODIUM 40 MG: 40 INJECTION SUBCUTANEOUS at 09:05

## 2020-02-02 RX ADMIN — BETHANECHOL CHLORIDE 25 MG: 10 TABLET ORAL at 14:06

## 2020-02-02 RX ADMIN — GABAPENTIN 1200 MG: 600 TABLET, FILM COATED ORAL at 10:58

## 2020-02-02 RX ADMIN — FAMOTIDINE 20 MG: 20 TABLET ORAL at 21:53

## 2020-02-02 RX ADMIN — GABAPENTIN 1800 MG: 600 TABLET, FILM COATED ORAL at 21:42

## 2020-02-02 RX ADMIN — Medication 10 ML: at 09:04

## 2020-02-02 RX ADMIN — ASPIRIN 325 MG ORAL TABLET 325 MG: 325 PILL ORAL at 21:41

## 2020-02-02 RX ADMIN — ZOLPIDEM TARTRATE 10 MG: 5 TABLET ORAL at 21:41

## 2020-02-02 RX ADMIN — Medication 10 MG: at 00:37

## 2020-02-02 RX ADMIN — GABAPENTIN 600 MG: 600 TABLET, FILM COATED ORAL at 09:04

## 2020-02-02 RX ADMIN — PIPERACILLIN AND TAZOBACTAM 3.38 G: 3; .375 INJECTION, POWDER, LYOPHILIZED, FOR SOLUTION INTRAVENOUS at 04:39

## 2020-02-02 RX ADMIN — ATORVASTATIN CALCIUM 80 MG: 80 TABLET, FILM COATED ORAL at 21:41

## 2020-02-02 ASSESSMENT — PAIN DESCRIPTION - PAIN TYPE: TYPE: ACUTE PAIN;CHRONIC PAIN

## 2020-02-02 ASSESSMENT — ENCOUNTER SYMPTOMS
EYE PAIN: 0
ABDOMINAL DISTENTION: 1
VOMITING: 0
SINUS PAIN: 0
BACK PAIN: 0
ABDOMINAL PAIN: 0
CONSTIPATION: 0
DIARRHEA: 0
SHORTNESS OF BREATH: 0

## 2020-02-02 ASSESSMENT — PAIN SCALES - GENERAL
PAINLEVEL_OUTOF10: 0

## 2020-02-02 NOTE — PROGRESS NOTES
Pt ao4, vss, denied any pain n/v, sob, chestpain, or HA at this moment. Needs are met. Pt came up w wyatt that was replaced in ed. Pt in bed w bed alarm on and call light in reach.  Will continal tomorrow

## 2020-02-02 NOTE — ED NOTES
Pt given boxed lunch. Call light within reach. Will continue to monitor.       Beck Todd RN  02/01/20 9736

## 2020-02-02 NOTE — CONSULTS
Urology Attending Consult Note      Reason for Consultation: CHRONIC PARADA, UTI'S    History: 68 YO WITH MULT MED ISSUES.  HISTORY INVOLVES CHRONIC URINARY RETENTION. DR Lorrie Randle DID A CYSTO 2019 WHICH SHOWED A SMALL PROSTATE AND LIKELY NEUROGENIC BLADDER. A TURP IS UNLIKELY TO BE SUCCESSFUL. HE RETURNS WITH LIKELY UTI. Family History, Social History, Review of Systems:  Reviewed and agreed to as per chart    Vitals:  BP (!) 103/59   Pulse 71   Temp 96.8 °F (36 °C) (Axillary)   Resp 18   Ht 5' 11\" (1.803 m)   Wt 186 lb (84.4 kg)   SpO2 97%   BMI 25.94 kg/m²   Temp  Av.4 °F (36.3 °C)  Min: 96.8 °F (36 °C)  Max: 97.9 °F (36.6 °C)    Intake/Output Summary (Last 24 hours) at 2020 1116  Last data filed at 2020 0715  Gross per 24 hour   Intake 10 ml   Output 300 ml   Net -290 ml         Physical:   Well developed, well nourished in no acute distress   Mood indicates no abnormalities. Pt doesnt appear depressed   Orientated to time and place   Neck is supple, trachea is midline   Respiratory effort is normal   Cardiovascular show no extremity swelling   Abdomen no masses or hernias are palpated, there is no tenderness. Liver and Spleen appear normal.   Skin show no abnormal lesions   Lymph nodes are not palpated in the inguinal, neck, or axillary area. Male :   Penis appears normal and circumcised   Urethral meatus is normal in size and location. PARADA SEEN   Scrotum appears normal and both testicles appear normal in size and location   Sphincter has good tone   Anus is inspected.  There are no perineal masses   Prostate is SMALL, no tenderness and no induration   Seminal Vesicles are not palpable      Labs:  WBC:    Lab Results   Component Value Date    WBC 5.9 2020     Hemoglobin/Hematocrit:    Lab Results   Component Value Date    HGB 9.8 2020    HCT 29.6 2020     BMP:    Lab Results   Component Value Date     2020    K 4.5 2020  02/02/2020    CO2 21 02/02/2020    BUN 35 02/02/2020    LABALBU 3.5 02/01/2020    CREATININE 0.8 02/02/2020    CALCIUM 8.4 02/02/2020    GFRAA >60 02/02/2020    LABGLOM >60 02/02/2020     PT/INR:    Lab Results   Component Value Date    PROTIME 12.0 12/17/2019    INR 1.03 12/17/2019     PTT:    Lab Results   Component Value Date    APTT 32.0 12/17/2019   [APTT        Urine Culture: PENDING        Antibiotic Therapy: ZOSYN    Imaging: N/A    Impression/Plan: 66 YO WITH NGB, CHRONIC PARADA, UTI  -CYSTO SHOWS SMALL PROSTATE (1/19). TURP IS UNLIKELY TO HELP  -WE HAVE NOT TRIED A VOIDING TRIAL IN OVER A YEAR  -PLAN: START BETHANECHOL, FLOMAX. REMOVE PARADA Monday AM AND WILL DO VOIDING TRIAL. CIC Q8 HOURS IF UNABLE TO VOID.  CONSIDER SPT PLACEMENT IF HE FAILS    Rommel Griffith MD

## 2020-02-02 NOTE — H&P
Internal Medicine  PGY 3  History & Physical      CC unable to place schneider    History Obtained From:  patient    HISTORY OF PRESENT ILLNESS:  Kirk Cary is a 68 yo male with hx of CAD s/p CABG, severe BPH on chronic schneider, GERD, chronic back pain, PAfib, CKD, venous stasis BL LE wound who presented with urinary retention after his chronic schneider came out. He was not able to place schneider back. Patient denies any symptoms such as headache, fever, chest pain, SOB, abdominal pain, dysuria, nausea, or diarrhea. He added his temperature has been low in two recordings recently but he never felt chill or being cold. He was recently discharged from [facility] for dyspnea. He has chronic wounds in bilateral legs with diminished sensation. Patient stated he felt weak and more fatigued recently to ED staffs but he feels better now. Patient was admitted for positive urinalysis for infection and possibly assistance with placement.      Past Medical History:        Diagnosis Date    BPH (benign prostatic hyperplasia)     Carotid stenosis 12/2013    JESU 79-56% stenosis; LICA 55-36% stenosis    Cellulitis 12/2013    LLE    Chronic back pain     Diverticular disease     Erectile dysfunction     GERD (gastroesophageal reflux disease)     History of atrial fibrillation     Hypercholesteremia     Hypertension     Lower GI bleed     MDRO (multiple drug resistant organisms) resistance 10/26/2019    urine    Neuromuscular disorder (HCC)     spasticity    Renal insufficiency     Risk for falls     uses walker    Tinea corporis 12/2013    LLE    Vitamin B12 deficiency        Past Surgical History:        Procedure Laterality Date    BACK SURGERY  2006    lower lumbar    CORONARY ARTERY BYPASS GRAFT  12/13/2013    CABG x 5 (Dr Shante Burkett)   [facility] N/A 1/10/2019    CYSTOSCOPY performed by Ameya Hernandez MD at [place] [address] Right 5/1/2015    [facility]       Medications Priorto Admission:    Not in a hospital admission. Allergies:  Bactrim [sulfamethoxazole-trimethoprim]    Social History:   · TOBACCO:   reports that he quit smoking about 50 years ago. He has never used smokeless tobacco.  · ETOH:   reports no history of alcohol use. · DRUGS : n/a  · Patient currently lives at home with daughter  ·   Family History:       Problem Relation Age of Onset    Heart Disease Father        Review of Systems   Constitutional: Positive for appetite change, chills and fatigue. Negative for activity change and fever. HENT: Negative for congestion. Respiratory: Negative for cough, choking, chest tightness, shortness of breath and wheezing. Cardiovascular: Positive for leg swelling. Negative for chest pain and palpitations. Genitourinary: Positive for difficulty urinating. Negative for dysuria and hematuria. Musculoskeletal: Positive for back pain. Neurological: Negative for dizziness, seizures, facial asymmetry, light-headedness, numbness and headaches. Physical Exam  Constitutional:       General: He is not in acute distress. HENT:      Head: Normocephalic and atraumatic. Eyes:      Extraocular Movements: Extraocular movements intact. Pupils: Pupils are equal, round, and reactive to light. Cardiovascular:      Rate and Rhythm: Normal rate and regular rhythm. Pulses: Normal pulses. Heart sounds: No murmur. Pulmonary:      Effort: Pulmonary effort is normal.      Breath sounds: No wheezing or rales. Abdominal:      General: Abdomen is flat. Bowel sounds are normal.      Palpations: Abdomen is soft. Tenderness: There is no abdominal tenderness. Genitourinary:     Comments: Iglesias in place  Musculoskeletal:      Comments: LLE swelling 1+ bilaterally with some weeping water. No tenderness or warmth. Diminished sensation on both foot but more intact on ankle. Skin:     Findings: Erythema, lesion and rash present. Neurological:      General: No focal deficit present. Mental Status: He is alert and oriented to person, place, and time. Cranial Nerves: No cranial nerve deficit. Coordination: Coordination normal.       Physical exam:       Vitals:    02/01/20 2300   BP: 117/65   Pulse: 70   Resp: 19   Temp:    SpO2:        DATA:    Labs:  CBC:   Recent Labs     02/01/20 2050   WBC 7.1   HGB 11.1*   HCT 34.5*          BMP:   Recent Labs     02/01/20 2050      K 4.2      CO2 25   BUN 34*   CREATININE 0.6*   GLUCOSE 117*     LFT's:   Recent Labs     02/01/20 2050   AST 20   ALT 22   BILITOT 0.3   ALKPHOS 254*     Troponin:   Recent Labs     02/01/20 2050 02/01/20 2305   TROPONINI 0.03* 0.03*     U/A:  Recent Labs     02/01/20 2149   COLORU Yellow   PHUR 6.0   WBCUA *   RBCUA 0-2   BACTERIA 4+*   CLARITYU SL CLOUDY*   SPECGRAV 1.025   LEUKOCYTESUR MODERATE*   UROBILINOGEN 1.0   BILIRUBINUR Negative   BLOODU TRACE-INTACT*   GLUCOSEU Negative   AMORPHOUS 1+*       XR CHEST STANDARD (2 VW)   Final Result      Left persistent pleural-based consolidation and pleural effusion. Slightly increased compared to prior study      Prior sternotomy. Right lung remains clear. ASSESSMENT AND PLAN:  Sanjay Dill is a 66 yo male with hx of CAD s/p CABG, severe BPH on chronic schneider, GERD, chronic back pain, PAfib, CKD, venous stasis BL LE wound who presented with urinary retention after his chronic schneider came out. Complicated UTI - chronic indwelling schneider. Urine culture in 11/2019 grew Proteus penneri that is MDRO but sensitive to carbapenem and zosyn. Asymptomatic other than mild fatigue and reported low temperature at home. Patient needs schneider due to severe BPH. [facility] urology outpatient. UA grossly positive. - treat UTI with zosyn  - eval for PO switch when appropriate  - BMP/CBC  - blood culture  - f/u urine culture  - encourage PO intake  - Case management consulted for placement aid  - PT/OT    Chronic venous stasis wounds - no complaints.  Has seen podiatry in past.   - Wound care    CAD s/p CABG   - continue ASA    Neuromuscular disorder - unclear etiology. Pt is on baclofen pump. - continue pump and home gabapentin    CKD III - stable. Recent admission with hyperkalemia and ALIS but stable this time. - daily bmp    HLD  - continue statin and niacin    Will discuss with attending physician Dr. Mallory Rose Status:Full code  FEN: Cardiac diet  PPX: Pepcid, lovenox  DISPO: IP    Janett Milian MD  2/1/2020,  11:45 PM    I agree with the resident's management plan, interpretation of the patient data. Addendum  Patient is a 20-year-old male with past medical history of CAD, BPH and chronic indwelling Iglesias who presented to hospital after Iglesias came off. Patient's UA suggestive of UTI, urology was called from ED, wanted to admit patient for recurrent UTI and possible urethral dilatation procedure. Denies fever chills or any other complaints at this time.     Assessment  Complicated UTI with multidrug-resistant organisms in the past    Plan  Continue Zosyn, urology consulted, follow-up on urine culture, PT/OT for mobility and case management for possible placement

## 2020-02-02 NOTE — ED PROVIDER NOTES
[address] ENCOUNTER          EM RESIDENT NOTE       Date of evaluation: 2/1/2020    Chief Complaint     Other (schneider catheter came out)      of Present Illness     Sujatha Miguel is a 67 y.o. male with PMH significant for CAD s/p CABG x5, chronic back pain with intrathecal baclofen pump, Afib, BPH with chronic Schneider catheter, CKD, venous stasis of bilateral lower extremities who presents for evaluation of dislodged Schneider catheter. Patient notes that earlier today, the Schneider catheter became dislodged. He notes that he has a home health nurse who comes Monday Wednesday and Friday. He formerly had home health aides that came Monday and Thursday, however they have not in the last few weeks. He denies any current symptoms. He notes that he is not ambulatory and needs wheelchair to get around. He also has trouble with activities of daily living when the nurse is not there including cleaning himself. Specifically he denies fevers, chills, congestion, sore throat, double vision, SOB, chest pain, abdominal pain, constipation, diarrhea, dysuria, frequency, urgency, falls, rash, sensory deficits, weakness, headache. Review of Systems     Review of Systems  All other systems are negative except as mentioned in HPI. Past Medical, Surgical, Family, and Social History     He has a past medical history of BPH (benign prostatic hyperplasia), Carotid stenosis, Cellulitis, Chronic back pain, Diverticular disease, Erectile dysfunction, GERD (gastroesophageal reflux disease), History of atrial fibrillation, Hypercholesteremia, Hypertension, Lower GI bleed, MDRO (multiple drug resistant organisms) resistance, Neuromuscular disorder ([facility] [address] Renal insufficiency, Risk for falls, Tinea corporis, and Vitamin B12 deficiency. He has a past surgical history that includes back surgery (2006);  Foot surgery; Coronary artery bypass graft (12/13/2013); hip surgery (Right, 5/1/2015); and Cystoscopy in all 4 extremities  Skin:  Dry, no rashes  Extremities:   Edematous bilateral lower extremities with an abrasion on anterior right shin. Neuro:  Alert. Moves all four extremities to command. No focal deficit  Neuro:  Alert and oriented x 4. CN II-XII intact. 5/5 strength in all 4 extremities. Sensation grossly intact to light touch. Speech and mentation normal.  Gait narrow and stable    DiagnosticResults     EKG   Interpreted in conjunction with emergencydepartment physician José Miguel Mcginnis MD  Rhythm: normal sinus   Rate: normal  Axis: normal  Ectopy: none  Conduction: normal  ST Segments: no acute change  T Waves:no acute change  Q Waves: none  Clinical Impression: no acute changes    RADIOLOGY:  XR CHEST STANDARD (2 VW)   Final Result      Left persistent pleural-based consolidation and pleural effusion. Slightly increased compared to prior study      Prior sternotomy. Right lung remains clear.           LABS:   Results for orders placed or performed during the hospital encounter of 02/01/20   CBC Auto Differential   Result Value Ref Range    WBC 7.1 4.0 - 11.0 K/uL    RBC 3.52 (L) 4.20 - 5.90 M/uL    Hemoglobin 11.1 (L) 13.5 - 17.5 g/dL    Hematocrit 34.5 (L) 40.5 - 52.5 %    MCV 98.1 80.0 - 100.0 fL    MCH 31.6 26.0 - 34.0 pg    MCHC 32.3 31.0 - 36.0 g/dL    RDW 15.0 12.4 - 15.4 %    Platelets 525 676 - 588 K/uL    MPV 9.6 5.0 - 10.5 fL    Neutrophils % 77.1 %    Lymphocytes % 11.0 %    Monocytes % 7.0 %    Eosinophils % 4.2 %    Basophils % 0.7 %    Neutrophils Absolute 5.5 1.7 - 7.7 K/uL    Lymphocytes Absolute 0.8 (L) 1.0 - 5.1 K/uL    Monocytes Absolute 0.5 0.0 - 1.3 K/uL    Eosinophils Absolute 0.3 0.0 - 0.6 K/uL    Basophils Absolute 0.0 0.0 - 0.2 K/uL   Comprehensive Metabolic Panel w/ Reflex to MG   Result Value Ref Range    Sodium 139 136 - 145 mmol/L    Potassium reflex Magnesium 4.2 3.5 - 5.1 mmol/L    Chloride 107 99 - 110 mmol/L    CO2 25 21 - 32 mmol/L    Anion Gap 7 3 - 16    Glucose 117 (H) 70 - 99 mg/dL    BUN 34 (H) 7 - 20 mg/dL    CREATININE 0.6 (L) 0.8 - 1.3 mg/dL    GFR Non-African American >60 >60    GFR African American >60 >60    Calcium 8.8 8.3 - 10.6 mg/dL    Total Protein 7.2 6.4 - 8.2 g/dL    Alb 3.5 3.4 - 5.0 g/dL    Albumin/Globulin Ratio 0.9 (L) 1.1 - 2.2    Total Bilirubin 0.3 0.0 - 1.0 mg/dL    Alkaline Phosphatase 254 (H) 40 - 129 U/L    ALT 22 10 - 40 U/L    AST 20 15 - 37 U/L    Globulin 3.7 g/dL   Brain Natriuretic Peptide   Result Value Ref Range    Pro- 0 - 449 pg/mL   Troponin   Result Value Ref Range    Troponin 0.03 (H) <0.01 ng/mL   Urinalysis Reflex to Culture   Result Value Ref Range    Color, UA Yellow Straw/Yellow    Clarity, UA SL CLOUDY (A) Clear    Glucose, Ur Negative Negative mg/dL    Bilirubin Urine Negative Negative    Ketones, Urine Negative Negative mg/dL    Specific Gravity, UA 1.025 1.005 - 1.030    Blood, Urine TRACE-INTACT (A) Negative    pH, UA 6.0 5.0 - 8.0    Protein, UA Negative Negative mg/dL    Urobilinogen, Urine 1.0 <2.0 E.U./dL    Nitrite, Urine POSITIVE (A) Negative    Leukocyte Esterase, Urine MODERATE (A) Negative    Microscopic Examination YES     Urine Type NotGiven     Urine Reflex to Culture Yes    Lactic Acid, Plasma   Result Value Ref Range    Lactic Acid 0.9 0.4 - 2.0 mmol/L   Microscopic Urinalysis   Result Value Ref Range    WBC, UA  (A) 0 - 5 /HPF    RBC, UA 0-2 0 - 2 /HPF    Epi Cells 3-5 /HPF    Bacteria, UA 4+ (A) /HPF    Amorphous, UA 1+ (A) /HPF   Troponin   Result Value Ref Range    Troponin 0.03 (H) <0.01 ng/mL       ED BEDSIDE ULTRASOUND:      RECENT VITALS:  BP: 132/74, Temp: 97.2 °F (36.2 °C), Pulse: 67,Resp: 18, SpO2: 96 %     Procedures         ED Course     Nursing Notes, Past Medical Hx, Past Surgical Hx, Social Hx, Allergies, and Family Hx were reviewed.     The patient was given the following medications:  Orders Placed This Encounter   Medications    piperacillin-tazobactam (ZOSYN) 4.5 g in dextrose 5 % 100 mL IVPB (mini-bag)    aspirin tablet 325 mg    ferrous sulfate tablet 325 mg    atorvastatin (LIPITOR) tablet 80 mg    gabapentin (NEURONTIN) tablet 600 mg    gabapentin (NEURONTIN) tablet 1,200 mg    gabapentin (NEURONTIN) tablet 1,800 mg    isosorbide mononitrate (IMDUR) extended release tablet 30 mg    Melatonin TABS 10 mg    niacin (NIASPAN) extended release tablet 500 mg    famotidine (PEPCID) tablet 20 mg    zolpidem (AMBIEN) tablet 10 mg    sodium chloride flush 0.9 % injection 10 mL    sodium chloride flush 0.9 % injection 10 mL    magnesium hydroxide (MILK OF MAGNESIA) 400 MG/5ML suspension 30 mL    ondansetron (ZOFRAN) injection 4 mg    enoxaparin (LOVENOX) injection 40 mg    acetaminophen (TYLENOL) tablet 650 mg    piperacillin-tazobactam (ZOSYN) 3.375 g in dextrose 5 % 100 mL IVPB extended infusion (mini-bag)       CONSULTS:  IP CONSULT TO HOSPITALIST  IP CONSULT TO CASE MANAGEMENT    MEDICAL DECISION MAKING / ASSESSMENT / Themona Ward is a 67 y.o. male with a history and presentation as described above in HPI. The patient was evaluated by myself and the ED Attending Physician, Dr. Frankey Monks. All management and disposition plans were discussed and agreed upon. On initial evaluation, patient was afebrile, hemodynamically stable, asymptomatic with his Iglesias catheter displaced. Labs were ordered for basic medical screening with patient having significant chronic comorbidities. CMP relatively unremarkable without sodium/potassium abnormalities or ALIS. CBC without leukocytosis and stable anemia. Lactate also within normal limits. BNP not significantly elevated from previous. Troponin elevated at 0.03, however stable with EKG without dynamic or ischemic changes from previous. Patient's Iglesias was replaced here in the emergency department, with urine sample drawn afterwards. UA concerning for UTI. Patient also has history of MDRO only resistant to carbapenem and Zosyn.   Urine culture was sent at this time. Patient also started on Zosyn for presumed UTI. Medications received during this ED visit:  Medications   aspirin tablet 325 mg (has no administration in time range)   ferrous sulfate tablet 325 mg (has no administration in time range)   atorvastatin (LIPITOR) tablet 80 mg (has no administration in time range)   gabapentin (NEURONTIN) tablet 600 mg (has no administration in time range)   gabapentin (NEURONTIN) tablet 1,200 mg (has no administration in time range)   gabapentin (NEURONTIN) tablet 1,800 mg (has no administration in time range)   isosorbide mononitrate (IMDUR) extended release tablet 30 mg (has no administration in time range)   Melatonin TABS 10 mg (has no administration in time range)   niacin (NIASPAN) extended release tablet 500 mg (has no administration in time range)   famotidine (PEPCID) tablet 20 mg (has no administration in time range)   zolpidem (AMBIEN) tablet 10 mg (has no administration in time range)   sodium chloride flush 0.9 % injection 10 mL (has no administration in time range)   sodium chloride flush 0.9 % injection 10 mL (has no administration in time range)   magnesium hydroxide (MILK OF MAGNESIA) 400 MG/5ML suspension 30 mL (has no administration in time range)   ondansetron (ZOFRAN) injection 4 mg (has no administration in time range)   enoxaparin (LOVENOX) injection 40 mg (has no administration in time range)   acetaminophen (TYLENOL) tablet 650 mg (has no administration in time range)   piperacillin-tazobactam (ZOSYN) 3.375 g in dextrose 5 % 100 mL IVPB extended infusion (mini-bag) (has no administration in time range)   piperacillin-tazobactam (ZOSYN) 4.5 g in dextrose 5 % 100 mL IVPB (mini-bag) (4.5 g Intravenous New Bag 2/1/20 2250)       At this time the patient has been admitted to hospitalist for further evaluation and management of UTI.  The patient will continue to be monitored here in the emergency department until which time the patient is moved to the new treatment location. Clinical Impression     1. Urinary tract infection associated with indwelling urethral catheter, initial encounter [facility])        Disposition     PATIENT REFERRED TO:  Abhijeet Tran            DISCHARGE MEDICATIONS:  Current Discharge Medication List          DISPOSITION Admitted 02/01/2020 11:01:58 PM    1. The patient is to be admitted in stable/improved condition  2. Workup, treatment and diagnosis were discussed with the patient and/or family members; the patient agrees to the plan and all questions were addressed and answered.        Dong Carr MD  Resident  02/02/20 9445

## 2020-02-02 NOTE — PROGRESS NOTES
Patient is alert and oriented, wheelchair bound at baseline at home. Multiple skin issues BLE vascular compromise. Daughter lives with patient in his home and assists with needs. Non-skid socks in place, bed in low position side rails 2 of 4 up, bed alarm on. Tolerated breakfast well. Will continue to monitor.

## 2020-02-02 NOTE — PROGRESS NOTES
PGY-1 Resident Progress Note  [facility]    Admit Date: 2/1/2020       CC: unable to place schneider    Overnight Events:  No acute events overnight. Patient admits to some mild abdominal swelling but not pain. Pt denies F/C/N/V, HA, fatigue, CP, dyspnea, abdominal pain, constipation/diarrhea, and urinary symptoms. Hospital Course: Pt is a 67year old male who presented with urinary retention with a medical history significant for CAD s/p CABG, BPH on chronic schneider, GERD, chronic back pain, PAfib, CKD, venous stasis b/l LE wound. UA on 2/1 was grossly positive for infection, and patient was began on zosyn. Pt is also given wound care for his b/l LE wounds. Family Hx:   Family History   Problem Relation Age of Onset    Heart Disease Father        Scheduled Medications:    bethanechol  25 mg Oral TID    tamsulosin  0.4 mg Oral Daily    aspirin  325 mg Oral Nightly    ferrous sulfate  325 mg Oral Daily with breakfast    atorvastatin  80 mg Oral Nightly    gabapentin  600 mg Oral Daily    gabapentin  1,200 mg Oral Lunch    gabapentin  1,800 mg Oral Nightly    isosorbide mononitrate  30 mg Oral Daily    melatonin  10 mg Oral Nightly    famotidine  20 mg Oral BID    zolpidem  10 mg Oral Nightly    sodium chloride flush  10 mL Intravenous 2 times per day    enoxaparin  40 mg Subcutaneous Daily    piperacillin-tazobactam  3.375 g Intravenous Q8H      PRN Medications: niacin, sodium chloride flush, magnesium hydroxide, ondansetron, acetaminophen  Diet: DIET CARDIAC; Continuous Infusions:    Review of Systems   Constitutional: Negative for fatigue and fever. HENT: Negative for ear pain and sinus pain. Eyes: Negative for pain. Respiratory: Negative for shortness of breath. Cardiovascular: Negative for chest pain. Gastrointestinal: Positive for abdominal distention. Negative for abdominal pain, constipation, diarrhea and vomiting. Genitourinary: Negative for flank pain. Musculoskeletal: Negative for back pain and neck pain. Neurological: Negative for headaches. Admits neuropathy (chronic). Psychiatric/Behavioral: Negative for agitation, behavioral problems and confusion. PHYSICAL EXAM:  BP (!) 103/59   Pulse 71   Temp 96.8 °F (36 °C) (Axillary)   Resp 18   Ht 5' 11\" (1.803 m)   Wt 186 lb (84.4 kg)   SpO2 97%   BMI 25.94 kg/m²   No results for input(s): POCGLU in the last 72 hours. Intake/Output Summary (Last 24 hours) at 2/2/2020 1159  Last data filed at 2/2/2020 1134  Gross per 24 hour   Intake 10 ml   Output 850 ml   Net -840 ml       Physical Exam  Constitutional:       General: He is not in acute distress. Appearance: Normal appearance. He is normal weight. He is not ill-appearing or diaphoretic. HENT:      Head: Normocephalic and atraumatic. Eyes:      General: No scleral icterus. Cardiovascular:      Rate and Rhythm: Normal rate and regular rhythm. Heart sounds: Normal heart sounds. No murmur. No friction rub. No gallop. Pulmonary:      Effort: Pulmonary effort is normal. No respiratory distress. Breath sounds: Normal breath sounds. No stridor. No wheezing, rhonchi or rales. Chest:      Chest wall: No tenderness. Abdominal:      General: Abdomen is flat. There is no distension. Palpations: Abdomen is soft. Tenderness: There is no abdominal tenderness. There is no right CVA tenderness, left CVA tenderness, guarding or rebound. Hernia: No hernia is present. Musculoskeletal:         General: Swelling present. No deformity. Comments: Swelling of b/l LE   Skin:     General: Skin is warm and dry. Findings: Bruising, erythema and lesion present. Neurological:      Mental Status: He is alert and oriented to person, place, and time. Psychiatric:         Mood and Affect: Mood normal.         Behavior: Behavior normal.         Thought Content:  Thought content normal.         Judgment: Judgment normal. LABS:  Recent Labs     02/01/20 2050 02/02/20  0445   WBC 7.1 5.9   HGB 11.1* 9.8*   HCT 34.5* 29.6*    142                                                                    Recent Labs     02/01/20 2050 02/02/20  0445    140   K 4.2 4.5    107   CO2 25 21   BUN 34* 35*   CREATININE 0.6* 0.8   GLUCOSE 117* 139*     Recent Labs     02/01/20 2050   AST 20   ALT 22   BILITOT 0.3   ALKPHOS 254*     Recent Labs     02/01/20 2050 02/01/20  2305   TROPONINI 0.03* 0.03*     No results for input(s): BNP in the last 72 hours. No results for input(s): CHOL, HDL in the last 72 hours. Invalid input(s): LDLCALCU  No results for input(s): INR in the last 72 hours. Imaging:          XR CHEST STANDARD (2 VW)   Final Result      Left persistent pleural-based consolidation and pleural effusion. Slightly increased compared to prior study      Prior sternotomy. Right lung remains clear. Assessment & Plan:      77M w/ PMH CAD s/p CABG, BPH on chronic schneider, GERD, chronic back pain, PAfib, CKD, venous stasis B/L LE wounds, and HLD presented on 02/01/20 w/ urinary retention after being unable to replace his chronic schneider. 1. UTI  - Hx of multile UTIs 2/2 chronic schneider (MDRO, carbapenem/zosyn sensitive). - UA (02/01/20): Leukocyte (+) / Nitrite (+). WBC . Culture pending.  - Cysto (01/2019): Neurogenic bladder.  - Plan: Continue UTI treatment zosyn 3.375g IV q8hr. Start bethanechol, flomax. Will plan for voiding trial per urology. Possible suprapubic catheter if voiding trial fails. F/U urine culture. 2. Venous stasis wounds (chronic)  - Wounds on b/l LE wounds. - Plan: Routine wound care. Consider podiatry consult. 3. CAD s/p CABG:   - Continue  mg QD.    4. HTN  - BP controlled. 117/58 this AM.  - Imdur 30 mg QD. 5. HLD  - Atorvastatin 80 mg qPM.    6. Neuromuscular spasticity  - Intrathecal baclofen pump    7. Neuropathy  - Gabapentin    8.  GERD  -

## 2020-02-02 NOTE — ED NOTES
Pt on telemetry monitor per protocol.  EKG completed by RN and shown to MD.        Vicente Michel RN  02/01/20 2045

## 2020-02-02 NOTE — PLAN OF CARE
Problem: Urinary Elimination:  Goal: Signs and symptoms of infection will decrease  Description  Signs and symptoms of infection will decrease  Outcome: Ongoing  Note:   Iglesias catheter in place, receiving intermittent antibiotics. No complaints of pain or discomfort during urination.

## 2020-02-02 NOTE — ED NOTES
Called report to [facility] ([facility]).      Junie Diop, ALPHONSO  02/01/20 5914

## 2020-02-02 NOTE — PROGRESS NOTES
Patient repositioned, bed alarm on, denies any pain or discomfort. Sitting up in bed prepared for lunch.

## 2020-02-02 NOTE — PROGRESS NOTES
4 Eyes Admission Assessment     I agree as the admission nurse that 2 RN's have performed a thorough Head to Toe Skin Assessment on the patient. ALL assessment sites listed below have been assessed on admission.        Areas assessed by both nurses:   [x]   Head, Face, and Ears   [x]   Shoulders, Back, and Chest  [x]   Arms, Elbows, and Hands  Scab and scattered bruises on RUE   [x]   Coccyx, Sacrum, and Ischum : unblanchable dark redness (IAD?) and blisters  [x]   Legs, Feet, and Heels: scattered bruises BLE, old healing blister on top of L foot, abrasion that is weeping on R shin, thicken yellow scabs scattered BLE         Does the Patient have Skin Breakdown?   yes  Terrance Prevention initiated:  yes  Wound Care Orders initiated:  yes      [facility] nurse consulted for Pressure Injury (Stage 3,4, Unstageable, DTI, NWPT, and Complex wounds):  yes    Nurse 1 eSignature: Electronically signed by Baron Ribera RN on 2/2/2020 at 2:07 AM      Nurse 2 eSignature: {Esignature:[id number]}

## 2020-02-02 NOTE — ED TRIAGE NOTES
Pt a/o x 4. Pt states his schneider catheter came out. Pt states he would like to be cleaned up and have a new schneider put in. Per EMS the pt is living in unhealthy living standards. Pt states his daughter lives with him. Pt states he has a Pt presents with bilateral lower extremity swelling and abrasion on left lower leg. Pt states he is wheelchair bound.

## 2020-02-03 LAB
ANION GAP SERPL CALCULATED.3IONS-SCNC: 13 MMOL/L (ref 3–16)
BASOPHILS ABSOLUTE: 0 K/UL (ref 0–0.2)
BASOPHILS RELATIVE PERCENT: 0.5 %
BUN BLDV-MCNC: 35 MG/DL (ref 7–20)
CALCIUM SERPL-MCNC: 8.4 MG/DL (ref 8.3–10.6)
CHLORIDE BLD-SCNC: 104 MMOL/L (ref 99–110)
CO2: 20 MMOL/L (ref 21–32)
CREAT SERPL-MCNC: 1.1 MG/DL (ref 0.8–1.3)
CREATININE URINE: 84.3 MG/DL (ref 39–259)
EOSINOPHILS ABSOLUTE: 0.2 K/UL (ref 0–0.6)
EOSINOPHILS RELATIVE PERCENT: 2.3 %
GFR AFRICAN AMERICAN: >60
GFR NON-AFRICAN AMERICAN: >60
GLUCOSE BLD-MCNC: 108 MG/DL (ref 70–99)
HCT VFR BLD CALC: 30.3 % (ref 40.5–52.5)
HEMOGLOBIN: 9.9 G/DL (ref 13.5–17.5)
LYMPHOCYTES ABSOLUTE: 0.6 K/UL (ref 1–5.1)
LYMPHOCYTES RELATIVE PERCENT: 6.6 %
MCH RBC QN AUTO: 32 PG (ref 26–34)
MCHC RBC AUTO-ENTMCNC: 32.7 G/DL (ref 31–36)
MCV RBC AUTO: 97.9 FL (ref 80–100)
MONOCYTES ABSOLUTE: 0.5 K/UL (ref 0–1.3)
MONOCYTES RELATIVE PERCENT: 5.4 %
NEUTROPHILS ABSOLUTE: 7.6 K/UL (ref 1.7–7.7)
NEUTROPHILS RELATIVE PERCENT: 85.2 %
PDW BLD-RTO: 15.1 % (ref 12.4–15.4)
PLATELET # BLD: 135 K/UL (ref 135–450)
PMV BLD AUTO: 9.8 FL (ref 5–10.5)
POTASSIUM REFLEX MAGNESIUM: 4.8 MMOL/L (ref 3.5–5.1)
RBC # BLD: 3.1 M/UL (ref 4.2–5.9)
SODIUM BLD-SCNC: 137 MMOL/L (ref 136–145)
SODIUM URINE: <20 MMOL/L
WBC # BLD: 8.9 K/UL (ref 4–11)

## 2020-02-03 PROCEDURE — 2580000003 HC RX 258: Performed by: INTERNAL MEDICINE

## 2020-02-03 PROCEDURE — 85025 COMPLETE CBC W/AUTO DIFF WBC: CPT

## 2020-02-03 PROCEDURE — 97166 OT EVAL MOD COMPLEX 45 MIN: CPT

## 2020-02-03 PROCEDURE — 6370000000 HC RX 637 (ALT 250 FOR IP): Performed by: STUDENT IN AN ORGANIZED HEALTH CARE EDUCATION/TRAINING PROGRAM

## 2020-02-03 PROCEDURE — 97162 PT EVAL MOD COMPLEX 30 MIN: CPT

## 2020-02-03 PROCEDURE — 84300 ASSAY OF URINE SODIUM: CPT

## 2020-02-03 PROCEDURE — 6360000002 HC RX W HCPCS: Performed by: STUDENT IN AN ORGANIZED HEALTH CARE EDUCATION/TRAINING PROGRAM

## 2020-02-03 PROCEDURE — 80048 BASIC METABOLIC PNL TOTAL CA: CPT

## 2020-02-03 PROCEDURE — 6370000000 HC RX 637 (ALT 250 FOR IP): Performed by: UROLOGY

## 2020-02-03 PROCEDURE — 2580000003 HC RX 258: Performed by: STUDENT IN AN ORGANIZED HEALTH CARE EDUCATION/TRAINING PROGRAM

## 2020-02-03 PROCEDURE — 97110 THERAPEUTIC EXERCISES: CPT

## 2020-02-03 PROCEDURE — 1200000000 HC SEMI PRIVATE

## 2020-02-03 PROCEDURE — 97530 THERAPEUTIC ACTIVITIES: CPT

## 2020-02-03 PROCEDURE — 97535 SELF CARE MNGMENT TRAINING: CPT

## 2020-02-03 PROCEDURE — 82570 ASSAY OF URINE CREATININE: CPT

## 2020-02-03 PROCEDURE — 36415 COLL VENOUS BLD VENIPUNCTURE: CPT

## 2020-02-03 RX ORDER — HEPARIN SODIUM 5000 [USP'U]/ML
5000 INJECTION, SOLUTION INTRAVENOUS; SUBCUTANEOUS EVERY 8 HOURS SCHEDULED
Status: DISCONTINUED | OUTPATIENT
Start: 2020-02-03 | End: 2020-02-13 | Stop reason: HOSPADM

## 2020-02-03 RX ORDER — UREA 10 %
3 LOTION (ML) TOPICAL NIGHTLY PRN
Status: DISCONTINUED | OUTPATIENT
Start: 2020-02-03 | End: 2020-02-04

## 2020-02-03 RX ORDER — ZOLPIDEM TARTRATE 5 MG/1
5 TABLET ORAL NIGHTLY PRN
Status: DISCONTINUED | OUTPATIENT
Start: 2020-02-03 | End: 2020-02-04

## 2020-02-03 RX ORDER — SODIUM CHLORIDE 9 MG/ML
INJECTION, SOLUTION INTRAVENOUS CONTINUOUS
Status: DISCONTINUED | OUTPATIENT
Start: 2020-02-03 | End: 2020-02-05

## 2020-02-03 RX ORDER — SPIRONOLACTONE 25 MG/1
25 TABLET ORAL 2 TIMES DAILY
Status: ON HOLD | COMMUNITY
End: 2020-03-20 | Stop reason: HOSPADM

## 2020-02-03 RX ADMIN — FAMOTIDINE 20 MG: 20 TABLET ORAL at 09:46

## 2020-02-03 RX ADMIN — PIPERACILLIN AND TAZOBACTAM 3.38 G: 3; .375 INJECTION, POWDER, LYOPHILIZED, FOR SOLUTION INTRAVENOUS at 13:53

## 2020-02-03 RX ADMIN — Medication 10 ML: at 10:47

## 2020-02-03 RX ADMIN — HEPARIN SODIUM 5000 UNITS: 5000 INJECTION INTRAVENOUS; SUBCUTANEOUS at 14:07

## 2020-02-03 RX ADMIN — GABAPENTIN 600 MG: 600 TABLET, FILM COATED ORAL at 09:41

## 2020-02-03 RX ADMIN — FERROUS SULFATE TAB 325 MG (65 MG ELEMENTAL FE) 325 MG: 325 (65 FE) TAB at 09:42

## 2020-02-03 RX ADMIN — Medication 3 MG: at 21:03

## 2020-02-03 RX ADMIN — TAMSULOSIN HYDROCHLORIDE 0.4 MG: 0.4 CAPSULE ORAL at 09:42

## 2020-02-03 RX ADMIN — ZOLPIDEM TARTRATE 5 MG: 5 TABLET ORAL at 21:04

## 2020-02-03 RX ADMIN — BETHANECHOL CHLORIDE 25 MG: 10 TABLET ORAL at 14:06

## 2020-02-03 RX ADMIN — ASPIRIN 325 MG ORAL TABLET 325 MG: 325 PILL ORAL at 21:03

## 2020-02-03 RX ADMIN — ATORVASTATIN CALCIUM 80 MG: 80 TABLET, FILM COATED ORAL at 21:03

## 2020-02-03 RX ADMIN — BETHANECHOL CHLORIDE 25 MG: 10 TABLET ORAL at 21:03

## 2020-02-03 RX ADMIN — PIPERACILLIN AND TAZOBACTAM 3.38 G: 3; .375 INJECTION, POWDER, LYOPHILIZED, FOR SOLUTION INTRAVENOUS at 21:03

## 2020-02-03 RX ADMIN — BETHANECHOL CHLORIDE 25 MG: 10 TABLET ORAL at 09:40

## 2020-02-03 RX ADMIN — SODIUM CHLORIDE: 9 INJECTION, SOLUTION INTRAVENOUS at 13:54

## 2020-02-03 RX ADMIN — Medication 10 ML: at 21:04

## 2020-02-03 RX ADMIN — HEPARIN SODIUM 5000 UNITS: 5000 INJECTION INTRAVENOUS; SUBCUTANEOUS at 21:04

## 2020-02-03 RX ADMIN — FAMOTIDINE 20 MG: 20 TABLET ORAL at 21:03

## 2020-02-03 RX ADMIN — PIPERACILLIN AND TAZOBACTAM 3.38 G: 3; .375 INJECTION, POWDER, LYOPHILIZED, FOR SOLUTION INTRAVENOUS at 05:52

## 2020-02-03 RX ADMIN — GABAPENTIN 1800 MG: 600 TABLET, FILM COATED ORAL at 21:03

## 2020-02-03 ASSESSMENT — PAIN DESCRIPTION - PAIN TYPE: TYPE: ACUTE PAIN;CHRONIC PAIN

## 2020-02-03 ASSESSMENT — PAIN SCALES - GENERAL
PAINLEVEL_OUTOF10: 0

## 2020-02-03 ASSESSMENT — ENCOUNTER SYMPTOMS
SHORTNESS OF BREATH: 0
CONSTIPATION: 0
SINUS PAIN: 0
DIARRHEA: 0
EYE PAIN: 0
VOMITING: 0
BACK PAIN: 0
ABDOMINAL PAIN: 0

## 2020-02-03 ASSESSMENT — VISUAL ACUITY: OU: 1

## 2020-02-03 NOTE — PROGRESS NOTES
Pt stated BP is usually higher at home. Stated he would like am imdur held due to lower BP than normal for him at this time.   Electronically signed by Surya Green RN on 2/3/2020 at 12:20 PM

## 2020-02-03 NOTE — PROGRESS NOTES
Pt is alert and oriented. VSS. Pt denies pain. Iglesias in place with clear yellow output. Pt in bed with wheels locked, bed is in the lowest position and bed alarm is on. Call light within reach.  Will continue to monitor

## 2020-02-03 NOTE — PROGRESS NOTES
IVF started due to hypotension. Pt tolerating well at this time. Pt up in chair denying needs at this time. Iglesias draining clear yellow urine. Pt denied pain. Pt refused to allow nurse to complete bath. Bath wipes given to patient to self perform.  Electronically signed by Higinio Petty RN on 2/3/2020 at 3:36 PM

## 2020-02-03 NOTE — PROGRESS NOTES
encounter [facility]). has a past medical history of BPH (benign prostatic hyperplasia), Carotid stenosis, Cellulitis, Chronic back pain, Diverticular disease, Erectile dysfunction, GERD (gastroesophageal reflux disease), History of atrial fibrillation, Hypercholesteremia, Hypertension, Lower GI bleed, MDRO (multiple drug resistant organisms) resistance, Neuromuscular disorder ([address] Renal insufficiency, Risk for falls, Tinea corporis, and Vitamin B12 deficiency. has a past surgical history that includes back surgery (2006); Foot surgery; Coronary artery bypass graft (12/13/2013); hip surgery (Right, 5/1/2015); and Cystoscopy (N/A, 1/10/2019). Treatment Diagnosis: impaired ADLs /functional transfers 2/2 UTI / schneider issues       Restrictions  Position Activity Restriction  Other position/activity restrictions: up with assistance     Subjective   General  Chart Reviewed: Yes  Patient assessed for rehabilitation services?: Yes  Additional Pertinent Hx: Admit 2/1 with UTI / Schneider cath dislodgement  Per EMS pt's living conditions are poor. Pt is w/c bound at baseline, Schneider cath placed in ED         PMHX: CAD s/p CABG x5, chronic back pain with intrathecal baclofen pump, Afib, BPH with chronic Schneider catheter, CKD, venous stasis of bilateral lower extremities  Family / Caregiver Present: No  Diagnosis: UTI/ Chronic Schneider cath dislodgement   Subjective  Subjective: \" I think I'm going home soon\" pt in bed agreeable for OOB/OT eval and tx.    Patient Currently in Pain: Denies      Social/Functional History  Social/Functional History  Lives With: Daughter  Type of Home: House  Home Layout: One level  Home Access: Ramped entrance  [employer]: Bedside commode(in living room)  Bathroom Equipment: (sponge bath with assist)  Home Equipment: Wheelchair-manual, Lift chair  ADL Assistance: Needs assistance(aide does sponge bath (hasn't come for past few weeks))  Homemaking Assistance: (aide did cleaning)  Homemaking Responsibilities: No  Ambulation Assistance: (pt is non-ambulatory. IND with w/c propulsion (bilat UEs & LEs))  Transfer Assistance: Independent(stand pivot)  Active : No  Additional Comments: pt sleeps in lift-chair. HHA 2 x wk for 1 hr. pt reports HHA hasn't been coming for past few weeks, doesn't know why. daughter works. she gets groceries & does laundry. Per EMS home in poor condition. Objective  Treatment included functional transfer training, ADL's and pt. education. Orientation  Overall Orientation Status: Within Functional Limits     Balance  Sitting Balance: Modified independent   Standing Balance: Contact guard assistance(braced by chair (w/c) at home -- Pt unable to come to full stance -- sit / squat pivot to chair )  Standing Balance  Time: 1 mins x 1 and 30 sec x 2   Activity: functional transfers / partial stance   Functional Mobility  Functional Mobility Comments: unable to assess - pt uses w/c for all mobility    Toilet Transfers  Toilet - Technique: Squat pivot  Equipment Used: Standard bedside commode  Toilet Transfer: Minimal assistance;Contact guard assistance  Toilet Transfers Comments: simulated - pt braces self ( knees) on w/c or BSC with pivot transfer at home    ADL  Feeding: Setup  Grooming: Setup  LE Dressing: Minimal assistance; Moderate assistance(pt limited in hospital - has elaborate description of donning clothing at home. )  Toileting: Moderate assistance(has indwelling schneider cath 2/2 BPH )  Additional Comments: Pt needing increased time / effort.     Tone RUE  RUE Tone: Normotonic  Tone LUE  LUE Tone: Normotonic  Coordination  Coordination and Movement description: Decreased speed     Bed mobility  Sit to Supine: Contact guard assistance;Minimal assistance(with rail / HOB at 30 )  Comment: Pt sleeps in lift chair at home   Transfers  Stand Pivot Transfers: Contact guard assistance;Minimal assistance  Vision - Basic Assessment  Prior Vision: No visual deficits  Cognition  Overall Cognitive Status: [facility]  Cognition Comment: Pt demo poor insight into lack of assist / living conditions at home. LUE AROM (degrees)  LUE AROM : [facility]  LUE General AROM: Limited at End range shoulder flexion 0-100, elbow flexion 0-100, wrist -intact -- OA   Left Hand AROM (degrees)  Left Hand AROM: [facility]  RUE AROM (degrees)  RUE AROM : [facility]  RUE General AROM: Limited at End range shoulder flexion 0-100, elbow flexion 0-100, wrist -intact -- OA   Right Hand AROM (degrees)  Right Hand AROM: [facility]  LUE Strength  Gross LUE Strength: [facility]  L Hand General: 4/5  LUE Strength Comment: pt able to Lift self in sitting to scoot self 2/2 poor LE functional use   RUE Strength  Gross RUE Strength: [facility]  R Hand General: 4/5  RUE Strength Comment: pt able to Lift self in sitting to scoot self 2/2 poor LE functional use      Hand Dominance  Hand Dominance: Right     Plan  This note will serve as a discharge summary if patient is discharged from hospital before next treatment session.   Plan  Times per week: 2-5x  Times per day: Daily  Current Treatment Recommendations: Functional Mobility Training, Patient/Caregiver Education & Training, Self-Care / ADL, Safety Education & Training, Endurance Training    AM-PAC Score  AM-[facility] Inpatient Daily Activity Raw Score: 16 (02/03/20 1305)  AM-PAC Inpatient ADL T-Scale Score : 35.96 (02/03/20 1305)  ADL Inpatient CMS 0-100% Score: 53.32 (02/03/20 1305)  ADL Inpatient CMS G-Code Modifier : CK (02/03/20 1305)    Goals  Short term goals  Time Frame for Short term goals: at d/c   Short term goal 1: Bed /chair transfers <> BSC   Short term goal 2: LE Dressing with SBA and AE prn   Patient Goals   Patient goals : Go home      Therapy Time   Individual Concurrent Group Co-treatment   Time In 5372         Time Out 1216         Minutes 39              Timed Code Treatment Minutes:  24 mins     Total Treatment Minutes:  39 mins       Kellee Bazan OT

## 2020-02-03 NOTE — CONSULTS
Clinical Pharmacy Progress Note  Medication History     Admit Date: 2/1/2020    Asked to verify home medications by Dr. Zoya Acevedo. List of of current medications patient is taking is complete. Home Medication list in EPIC updated to reflect changes noted below. Source of information: patient interview and Surescripts    Changes made to medication list:   Medications removed (no longer taking):  · None    Medications added:   · Spironolactone    Medication doses / instructions adjusted:   · Niacin - takes night prn for symptoms of spasticity     Other notes:   · Pt takes Gabapentin 600mg qAM + 1200mg at 2pm + 1800mg at bedtime. · When asked if he is taking any water pills (since Spironolactone was filled recently), pt thought hard and said he thinks he is taking Oxybutynin and Tamsulosin at home; however, Oxybutynin has not been filled since October 2019 (30 day supply) and Tamsulosin has not been filled since 2/2019. Spironolactone has been filled regularly since at least August - added this to the home med list, but did not add Tamsulosin or Oxybutynin.     Please call with questions--  Thanks--  Radha Rosas, PharmD, [place], [address] or [phone] (wireless)  2/3/2020 2:33 PM

## 2020-02-03 NOTE — PROGRESS NOTES
Progress Note    Admit Date: 2/1/2020  Day: 2  Diet: DIET CARDIAC;    CC: UTI    Interval history: No overnight events. Patient examined this AM and was sleep. Took several attempts to wake him although he eventually was able to answer questions and follow commands. At this time, he denies chest pain, SOB, abdominal pain, constipation and diarrhea. Denies any new pain as of this AM.    Medications:     Scheduled Meds:   heparin (porcine)  5,000 Units Subcutaneous 3 times per day    bethanechol  25 mg Oral TID    tamsulosin  0.4 mg Oral Daily    aspirin  325 mg Oral Nightly    ferrous sulfate  325 mg Oral Daily with breakfast    atorvastatin  80 mg Oral Nightly    gabapentin  600 mg Oral Daily    gabapentin  1,200 mg Oral Lunch    gabapentin  1,800 mg Oral Nightly    isosorbide mononitrate  30 mg Oral Daily    melatonin  10 mg Oral Nightly    famotidine  20 mg Oral BID    zolpidem  10 mg Oral Nightly    sodium chloride flush  10 mL Intravenous 2 times per day    piperacillin-tazobactam  3.375 g Intravenous Q8H     Continuous Infusions:  PRN Meds:niacin, sodium chloride flush, magnesium hydroxide, ondansetron, acetaminophen    Objective:   Vitals:   T-max:  Patient Vitals for the past 8 hrs:   BP Temp Temp src Pulse Resp SpO2   02/03/20 0751 (!) 96/55 98.1 °F (36.7 °C) Axillary 61 16 95 %   02/03/20 0259 133/65 97.6 °F (36.4 °C) Oral 58 17 95 %       Intake/Output Summary (Last 24 hours) at 2/3/2020 1002  Last data filed at 2/2/2020 2253  Gross per 24 hour   Intake 240 ml   Output 1300 ml   Net -1060 ml     Review of Systems   Constitutional: Negative for fatigue and fever. HENT: Negative for ear pain and sinus pain. Eyes: Negative for pain. Respiratory: Negative for shortness of breath. Cardiovascular: Negative for chest pain. Gastrointestinal: Negative for abdominal pain, constipation, diarrhea and vomiting. Genitourinary: Positive for difficulty urinating. Negative for flank pain. Musculoskeletal: Negative for back pain and neck pain. Neurological: Positive for numbness. Negative for headaches. Psychiatric/Behavioral: Negative for agitation, behavioral problems and confusion. Physical Exam  Constitutional:       General: He is awake. He is not in acute distress. Appearance: Normal appearance. Comments: Difficult to wake when examined this AM, but eventually was able to respond to questions. Fully oriented. HENT:      Head: Normocephalic and atraumatic. Nose: Nose normal.   Eyes:      General: Vision grossly intact. Gaze aligned appropriately. Right eye: No discharge. Left eye: No discharge. Extraocular Movements: Extraocular movements intact. Conjunctiva/sclera: Conjunctivae normal.   Neck:      Musculoskeletal: Full passive range of motion without pain, normal range of motion and neck supple. Cardiovascular:      Rate and Rhythm: Normal rate and regular rhythm. Pulses: Normal pulses. Heart sounds: Normal heart sounds. Pulmonary:      Effort: Pulmonary effort is normal.      Breath sounds: Normal breath sounds. Abdominal:      General: There is no distension. There are no signs of injury. Palpations: Abdomen is soft. Tenderness: There is no abdominal tenderness. Musculoskeletal:         General: No deformity or signs of injury. Skin:     General: Skin is warm. Neurological:      General: No focal deficit present. Mental Status: He is oriented to person, place, and time and easily aroused. Mental status is at baseline. Motor: Motor function is intact. Coordination: Coordination is intact. Gait: Gait is intact. Psychiatric:         Attention and Perception: Attention and perception normal.         Mood and Affect: Mood and affect normal.         Speech: Speech normal.         Behavior: Behavior normal. Behavior is cooperative. Thought Content:  Thought content normal. Cognition and Memory: Cognition normal.         Judgment: Judgment normal.       LABS:    CBC:   Recent Labs     02/01/20 2050 02/02/20 0445 02/03/20  0544   WBC 7.1 5.9 8.9   HGB 11.1* 9.8* 9.9*   HCT 34.5* 29.6* 30.3*    142 135   MCV 98.1 97.5 97.9     Renal:    Recent Labs     02/01/20 2050 02/02/20 0445 02/03/20  0544    140 137   K 4.2 4.5 4.8    107 104   CO2 25 21 20*   BUN 34* 35* 35*   CREATININE 0.6* 0.8 1.1   GLUCOSE 117* 139* 108*   CALCIUM 8.8 8.4 8.4   ANIONGAP 7 12 13     Hepatic:   Recent Labs     02/01/20 2050   AST 20   ALT 22   BILITOT 0.3   PROT 7.2   LABALBU 3.5   ALKPHOS 254*     Troponin:   Recent Labs     02/01/20 2050 02/01/20  2305   TROPONINI 0.03* 0.03*     BNP: No results for input(s): BNP in the last 72 hours. Lipids: No results for input(s): CHOL, HDL in the last 72 hours. Invalid input(s): LDLCALCU, TRIGLYCERIDE  ABGs:  No results for input(s): PHART, ASG6WWQ, PO2ART, WKT2VLZ, BEART, THGBART, D0FDHBOH, JSN6LEA in the last 72 hours. INR: No results for input(s): INR in the last 72 hours. Lactate: No results for input(s): LACTATE in the last 72 hours. Cultures:  -----------------------------------------------------------------  RAD:   XR CHEST STANDARD (2 VW)   Final Result      Left persistent pleural-based consolidation and pleural effusion. Slightly increased compared to prior study      Prior sternotomy. Right lung remains clear. Assessment/Plan:     77M w/ PMH CAD s/p CABG, BPH, GERD, back pain, A-fib, CKD, venous stasis B/L LE wounds, and HLD presented on 02/01/20 w/ urinary retention after being unable to replace schneider.      1. UTI  - Hx of multiple UTIs 2/2 chronic schneider (MDRO, carbapenem/zosyn sensitive). - UA (02/01/20): Leukocyte (+) / Nitrite (+). WBC . Culture pending.  - No white count.  WBC 7.1 > 5.9 > 8.9.  - Prior cultures: (+) for Proteus penneri (11/19/19, 10/26/19), Pseudomonas (01/03/19), and Proteus mirabilis (01/03/19). - Plan: Continue zosyn 3.375g IV q8hr. F/U urine culture. 2. Urinary retention  - Cysto (01/2019): Likely neurogenic bladder. Small prostate.  - 1.3 L UOP last 24 hours w/ catheter in place.  - Plan: Bethanechol 25 mg TID. Tamsulosin 0.4 mg QD. Voiding trial today. Possible suprapubic catheter if voiding trial fails. 3. ALIS  - Cr 0.6 > 0.8 > 1.1.  - Consider nephrology consult. 4. Venous stasis wounds (chronic)  - Wounds on b/l LE wounds. - Plan: Routine wound care. Consider podiatry consult.     5. CAD s/p CABG:   - Continue  mg QD.     6. HTN  - BP controlled. 133/65 this AM.  - Imdur 30 mg QD.     7. HLD  - Atorvastatin 80 mg qPM.     8. Neuromuscular spasticity  - Intrathecal baclofen pump     9. Neuropathy  - Gabapentin     10. GERD  - Famotidine 20 mg BID.      11. Iron deficiency  - Iron 325 mg QD.     12. Insomnia  - Melatonin 10 mg qPM.  - Ambien 10 mg qPM.     13. PPX  - D/C Lovenox d/t ALIS  - Heparin. Code Status: Full code  FEN: Cardiac  DISPO: Floor    Rigo Herrera DO, PGY-1    02/03/20  10:02 AM    This patient has been staffed and discussed with Rhett Hollins MD.       Patient seen and examined, plan of care discussed with residents. Agree with their assessment and plan with following addendum:  Add IVF, monitor I/O, daily renal panel, keep on zosyn for pseudomonal UTI, pending sensitivities.        Rhett Hollins

## 2020-02-03 NOTE — PROGRESS NOTES
Urology Attending Progress Note      Subjective: weak. No complaints     Vitals:  BP (!) 96/55   Pulse 61   Temp 98.1 °F (36.7 °C) (Axillary)   Resp 16   Ht 5' 11\" (1.803 m)   Wt 186 lb (84.4 kg)   SpO2 95%   BMI 25.94 kg/m²   Temp  Av.6 °F (36.4 °C)  Min: 97.3 °F (36.3 °C)  Max: 98.1 °F (36.7 °C)    Intake/Output Summary (Last 24 hours) at 2/3/2020 1143  Last data filed at 2020 2253  Gross per 24 hour   Intake 240 ml   Output 750 ml   Net -510 ml       Exam: abd soft and non-tender. Schneider draining clear urine. Labs:  WBC:    Lab Results   Component Value Date    WBC 8.9 2020     Hemoglobin/Hematocrit:    Lab Results   Component Value Date    HGB 9.9 2020    HCT 30.3 2020     BMP:    Lab Results   Component Value Date     2020    K 4.8 2020     2020    CO2 20 2020    BUN 35 2020    LABALBU 3.5 2020    CREATININE 1.1 2020    CALCIUM 8.4 2020    GFRAA >60 2020    LABGLOM >60 2020     PT/INR:    Lab Results   Component Value Date    PROTIME 12.0 2019    INR 1.03 2019     PTT:    Lab Results   Component Value Date    APTT 32.0 2019   [APTT        Urine Culture:  Pseudomonas aeruginosa     Blood Culture:  NGTD    Antibiotic Therapy:  Zosyn     Imaging: no new       Impression/Plan: 66 yo M with chronic schneider 2/2 possible NGB. Cysto showed a small prostate     -labs WNL  -continue abx  -continue flomax and urecholine.   -will consider a voiding trial in 2-3 days.  He wants to hold off today  TIM Valenzuela - CNP

## 2020-02-03 NOTE — CONSULTS
Clinical Pharmacy Progress Note  Medication History     Admit Date: 2/1/2020    Asked to verify home medications by Dr. Carri Holcomb. List of of current medications patient is taking is complete. Home Medication list in EPIC updated to reflect changes noted below. Source of information: patient interview and Surescripts    Changes made to medication list:   Medications removed (no longer taking):  · None    Medications added:   · Spironolactone    Medication doses / instructions adjusted:   · Niacin - takes night prn for symptoms of spasticity     Other notes:   · Pt takes Gabapentin 600mg qAM + 1200mg at 2pm + 1800mg at bedtime. · When asked if he is taking any water pills (since Spironolactone was filled recently), pt thought hard and said he thinks he is taking Oxybutynin and Tamsulosin at home; however, Oxybutynin has not been filled since October 2019 (30 day supply) and Tamsulosin has not been filled since 2/2019. Spironolactone has been filled regularly since at least August - added this to the home med list, but did not add Tamsulosin or Oxybutynin.     Please call with questions--  Thanks--  Howie Zazueta, PharmD, [place], [address] or [phone] (wireless)  2/3/2020 3:41 PM

## 2020-02-04 ENCOUNTER — APPOINTMENT (OUTPATIENT)
Dept: GENERAL RADIOLOGY | Age: 80
DRG: 698 | End: 2020-02-04
Payer: MEDICARE

## 2020-02-04 LAB
A/G RATIO: 0.8 (ref 1.1–2.2)
ALBUMIN SERPL-MCNC: 2.9 G/DL (ref 3.4–5)
ALP BLD-CCNC: 184 U/L (ref 40–129)
ALT SERPL-CCNC: 22 U/L (ref 10–40)
ANION GAP SERPL CALCULATED.3IONS-SCNC: 10 MMOL/L (ref 3–16)
ANION GAP SERPL CALCULATED.3IONS-SCNC: 12 MMOL/L (ref 3–16)
AST SERPL-CCNC: 20 U/L (ref 15–37)
BASOPHILS ABSOLUTE: 0 K/UL (ref 0–0.2)
BASOPHILS ABSOLUTE: 0 K/UL (ref 0–0.2)
BASOPHILS RELATIVE PERCENT: 0.6 %
BASOPHILS RELATIVE PERCENT: 0.7 %
BILIRUB SERPL-MCNC: 0.3 MG/DL (ref 0–1)
BUN BLDV-MCNC: 30 MG/DL (ref 7–20)
BUN BLDV-MCNC: 31 MG/DL (ref 7–20)
CALCIUM SERPL-MCNC: 8.2 MG/DL (ref 8.3–10.6)
CALCIUM SERPL-MCNC: 8.4 MG/DL (ref 8.3–10.6)
CHLORIDE BLD-SCNC: 100 MMOL/L (ref 99–110)
CHLORIDE BLD-SCNC: 103 MMOL/L (ref 99–110)
CO2: 21 MMOL/L (ref 21–32)
CO2: 21 MMOL/L (ref 21–32)
CORTISOL TOTAL: 7.2 UG/DL
CREAT SERPL-MCNC: 1 MG/DL (ref 0.8–1.3)
CREAT SERPL-MCNC: 1.1 MG/DL (ref 0.8–1.3)
EKG ATRIAL RATE: 46 BPM
EKG DIAGNOSIS: NORMAL
EKG P AXIS: 37 DEGREES
EKG P-R INTERVAL: 286 MS
EKG Q-T INTERVAL: 476 MS
EKG QRS DURATION: 90 MS
EKG QTC CALCULATION (BAZETT): 416 MS
EKG R AXIS: 44 DEGREES
EKG T AXIS: 49 DEGREES
EKG VENTRICULAR RATE: 46 BPM
EOSINOPHILS ABSOLUTE: 0.3 K/UL (ref 0–0.6)
EOSINOPHILS ABSOLUTE: 0.4 K/UL (ref 0–0.6)
EOSINOPHILS RELATIVE PERCENT: 11.2 %
EOSINOPHILS RELATIVE PERCENT: 6.4 %
GFR AFRICAN AMERICAN: >60
GFR AFRICAN AMERICAN: >60
GFR NON-AFRICAN AMERICAN: >60
GFR NON-AFRICAN AMERICAN: >60
GLOBULIN: 3.5 G/DL
GLUCOSE BLD-MCNC: 118 MG/DL (ref 70–99)
GLUCOSE BLD-MCNC: 97 MG/DL (ref 70–99)
HCT VFR BLD CALC: 29.7 % (ref 40.5–52.5)
HCT VFR BLD CALC: 30.6 % (ref 40.5–52.5)
HEMOGLOBIN: 9.8 G/DL (ref 13.5–17.5)
HEMOGLOBIN: 9.9 G/DL (ref 13.5–17.5)
LACTIC ACID, SEPSIS: 0.7 MMOL/L (ref 0.4–1.9)
LYMPHOCYTES ABSOLUTE: 0.6 K/UL (ref 1–5.1)
LYMPHOCYTES ABSOLUTE: 1 K/UL (ref 1–5.1)
LYMPHOCYTES RELATIVE PERCENT: 16.7 %
LYMPHOCYTES RELATIVE PERCENT: 18.2 %
MAGNESIUM: 2.1 MG/DL (ref 1.8–2.4)
MCH RBC QN AUTO: 32 PG (ref 26–34)
MCH RBC QN AUTO: 32.2 PG (ref 26–34)
MCHC RBC AUTO-ENTMCNC: 32.2 G/DL (ref 31–36)
MCHC RBC AUTO-ENTMCNC: 33.1 G/DL (ref 31–36)
MCV RBC AUTO: 97.2 FL (ref 80–100)
MCV RBC AUTO: 99.2 FL (ref 80–100)
MONOCYTES ABSOLUTE: 0.3 K/UL (ref 0–1.3)
MONOCYTES ABSOLUTE: 0.4 K/UL (ref 0–1.3)
MONOCYTES RELATIVE PERCENT: 7 %
MONOCYTES RELATIVE PERCENT: 8.9 %
NEUTROPHILS ABSOLUTE: 2.2 K/UL (ref 1.7–7.7)
NEUTROPHILS ABSOLUTE: 3.6 K/UL (ref 1.7–7.7)
NEUTROPHILS RELATIVE PERCENT: 62.5 %
NEUTROPHILS RELATIVE PERCENT: 67.8 %
ORGANISM: ABNORMAL
PDW BLD-RTO: 14.7 % (ref 12.4–15.4)
PDW BLD-RTO: 14.8 % (ref 12.4–15.4)
PLATELET # BLD: 110 K/UL (ref 135–450)
PLATELET # BLD: 123 K/UL (ref 135–450)
PMV BLD AUTO: 9.5 FL (ref 5–10.5)
PMV BLD AUTO: 9.8 FL (ref 5–10.5)
POTASSIUM REFLEX MAGNESIUM: 4.6 MMOL/L (ref 3.5–5.1)
POTASSIUM SERPL-SCNC: 5 MMOL/L (ref 3.5–5.1)
RBC # BLD: 3.05 M/UL (ref 4.2–5.9)
RBC # BLD: 3.08 M/UL (ref 4.2–5.9)
SODIUM BLD-SCNC: 133 MMOL/L (ref 136–145)
SODIUM BLD-SCNC: 134 MMOL/L (ref 136–145)
T4 FREE: 0.8 NG/DL (ref 0.9–1.8)
T4 FREE: 0.9 NG/DL (ref 0.9–1.8)
TOTAL PROTEIN: 6.4 G/DL (ref 6.4–8.2)
TSH REFLEX: 5.03 UIU/ML (ref 0.27–4.2)
URINE CULTURE, ROUTINE: ABNORMAL
URINE CULTURE, ROUTINE: ABNORMAL
WBC # BLD: 3.5 K/UL (ref 4–11)
WBC # BLD: 5.4 K/UL (ref 4–11)

## 2020-02-04 PROCEDURE — 71045 X-RAY EXAM CHEST 1 VIEW: CPT

## 2020-02-04 PROCEDURE — 6360000002 HC RX W HCPCS: Performed by: STUDENT IN AN ORGANIZED HEALTH CARE EDUCATION/TRAINING PROGRAM

## 2020-02-04 PROCEDURE — 2580000003 HC RX 258: Performed by: INTERNAL MEDICINE

## 2020-02-04 PROCEDURE — 2580000003 HC RX 258: Performed by: STUDENT IN AN ORGANIZED HEALTH CARE EDUCATION/TRAINING PROGRAM

## 2020-02-04 PROCEDURE — 93010 ELECTROCARDIOGRAM REPORT: CPT | Performed by: INTERNAL MEDICINE

## 2020-02-04 PROCEDURE — 6370000000 HC RX 637 (ALT 250 FOR IP): Performed by: STUDENT IN AN ORGANIZED HEALTH CARE EDUCATION/TRAINING PROGRAM

## 2020-02-04 PROCEDURE — 93005 ELECTROCARDIOGRAM TRACING: CPT | Performed by: INTERNAL MEDICINE

## 2020-02-04 PROCEDURE — 80053 COMPREHEN METABOLIC PANEL: CPT

## 2020-02-04 PROCEDURE — 84439 ASSAY OF FREE THYROXINE: CPT

## 2020-02-04 PROCEDURE — 83605 ASSAY OF LACTIC ACID: CPT

## 2020-02-04 PROCEDURE — 1200000000 HC SEMI PRIVATE

## 2020-02-04 PROCEDURE — 84443 ASSAY THYROID STIM HORMONE: CPT

## 2020-02-04 PROCEDURE — 87040 BLOOD CULTURE FOR BACTERIA: CPT

## 2020-02-04 PROCEDURE — 6370000000 HC RX 637 (ALT 250 FOR IP): Performed by: UROLOGY

## 2020-02-04 PROCEDURE — 85025 COMPLETE CBC W/AUTO DIFF WBC: CPT

## 2020-02-04 PROCEDURE — 82533 TOTAL CORTISOL: CPT

## 2020-02-04 PROCEDURE — 6370000000 HC RX 637 (ALT 250 FOR IP): Performed by: INTERNAL MEDICINE

## 2020-02-04 PROCEDURE — 36415 COLL VENOUS BLD VENIPUNCTURE: CPT

## 2020-02-04 PROCEDURE — 83735 ASSAY OF MAGNESIUM: CPT

## 2020-02-04 RX ORDER — ZOLPIDEM TARTRATE 5 MG/1
5 TABLET ORAL NIGHTLY
Status: DISCONTINUED | OUTPATIENT
Start: 2020-02-04 | End: 2020-02-07

## 2020-02-04 RX ORDER — SODIUM CHLORIDE 0.9 % (FLUSH) 0.9 %
10 SYRINGE (ML) INJECTION EVERY 12 HOURS SCHEDULED
Status: DISCONTINUED | OUTPATIENT
Start: 2020-02-04 | End: 2020-02-04 | Stop reason: SDUPTHER

## 2020-02-04 RX ORDER — 0.9 % SODIUM CHLORIDE 0.9 %
30 INTRAVENOUS SOLUTION INTRAVENOUS ONCE
Status: COMPLETED | OUTPATIENT
Start: 2020-02-04 | End: 2020-02-05

## 2020-02-04 RX ORDER — UREA 10 %
3 LOTION (ML) TOPICAL NIGHTLY
Status: DISCONTINUED | OUTPATIENT
Start: 2020-02-04 | End: 2020-02-07

## 2020-02-04 RX ORDER — LEVOTHYROXINE SODIUM 0.05 MG/1
50 TABLET ORAL DAILY
Status: DISCONTINUED | OUTPATIENT
Start: 2020-02-05 | End: 2020-02-13 | Stop reason: HOSPADM

## 2020-02-04 RX ORDER — SODIUM CHLORIDE 0.9 % (FLUSH) 0.9 %
10 SYRINGE (ML) INJECTION PRN
Status: DISCONTINUED | OUTPATIENT
Start: 2020-02-04 | End: 2020-02-04 | Stop reason: SDUPTHER

## 2020-02-04 RX ADMIN — ISOSORBIDE MONONITRATE 30 MG: 30 TABLET, EXTENDED RELEASE ORAL at 09:22

## 2020-02-04 RX ADMIN — Medication 3 MG: at 21:48

## 2020-02-04 RX ADMIN — PIPERACILLIN AND TAZOBACTAM 3.38 G: 3; .375 INJECTION, POWDER, LYOPHILIZED, FOR SOLUTION INTRAVENOUS at 05:20

## 2020-02-04 RX ADMIN — FERROUS SULFATE TAB 325 MG (65 MG ELEMENTAL FE) 325 MG: 325 (65 FE) TAB at 09:23

## 2020-02-04 RX ADMIN — HEPARIN SODIUM 5000 UNITS: 5000 INJECTION INTRAVENOUS; SUBCUTANEOUS at 05:21

## 2020-02-04 RX ADMIN — ZOLPIDEM TARTRATE 5 MG: 5 TABLET ORAL at 21:48

## 2020-02-04 RX ADMIN — ATORVASTATIN CALCIUM 80 MG: 80 TABLET, FILM COATED ORAL at 21:48

## 2020-02-04 RX ADMIN — GABAPENTIN 600 MG: 600 TABLET, FILM COATED ORAL at 09:22

## 2020-02-04 RX ADMIN — SODIUM CHLORIDE 100 ML/HR: 9 INJECTION, SOLUTION INTRAVENOUS at 11:11

## 2020-02-04 RX ADMIN — SODIUM CHLORIDE: 9 INJECTION, SOLUTION INTRAVENOUS at 00:02

## 2020-02-04 RX ADMIN — SODIUM CHLORIDE 2532 ML: 9 INJECTION, SOLUTION INTRAVENOUS at 23:48

## 2020-02-04 RX ADMIN — GABAPENTIN 1800 MG: 600 TABLET, FILM COATED ORAL at 21:47

## 2020-02-04 RX ADMIN — PIPERACILLIN AND TAZOBACTAM 3.38 G: 3; .375 INJECTION, POWDER, LYOPHILIZED, FOR SOLUTION INTRAVENOUS at 21:30

## 2020-02-04 RX ADMIN — Medication 10 ML: at 09:25

## 2020-02-04 RX ADMIN — HEPARIN SODIUM 5000 UNITS: 5000 INJECTION INTRAVENOUS; SUBCUTANEOUS at 21:55

## 2020-02-04 RX ADMIN — SODIUM CHLORIDE: 9 INJECTION, SOLUTION INTRAVENOUS at 21:45

## 2020-02-04 RX ADMIN — FAMOTIDINE 20 MG: 20 TABLET ORAL at 09:23

## 2020-02-04 RX ADMIN — TAMSULOSIN HYDROCHLORIDE 0.4 MG: 0.4 CAPSULE ORAL at 09:23

## 2020-02-04 RX ADMIN — FAMOTIDINE 20 MG: 20 TABLET ORAL at 21:48

## 2020-02-04 RX ADMIN — MEROPENEM 1 G: 1 INJECTION, POWDER, FOR SOLUTION INTRAVENOUS at 22:29

## 2020-02-04 RX ADMIN — PIPERACILLIN AND TAZOBACTAM 3.38 G: 3; .375 INJECTION, POWDER, LYOPHILIZED, FOR SOLUTION INTRAVENOUS at 14:45

## 2020-02-04 RX ADMIN — ASPIRIN 325 MG ORAL TABLET 325 MG: 325 PILL ORAL at 21:47

## 2020-02-04 ASSESSMENT — ENCOUNTER SYMPTOMS
EYE PAIN: 0
VOMITING: 0
ABDOMINAL PAIN: 0
CONSTIPATION: 0
DIARRHEA: 0
SHORTNESS OF BREATH: 0
SINUS PAIN: 0
BACK PAIN: 0

## 2020-02-04 ASSESSMENT — VISUAL ACUITY: OU: 1

## 2020-02-04 ASSESSMENT — PAIN SCALES - GENERAL
PAINLEVEL_OUTOF10: 0

## 2020-02-04 NOTE — PROGRESS NOTES
Perfect serve messages sent to Dr Salena Rodríguez to report oral and rectal temp of 92.7 with no axillary temp. Nurse attempted obtaining temp with 4 different machines as well as a disposable thermometer used for rectal temp. Pt is sleeping however easily aroused and alert. Pt denied needs. Nurse raised temperature in his room, placed multiple blankets on patient as well. Awaiting physician evaluation at this time.

## 2020-02-04 NOTE — PROGRESS NOTES
for abdominal pain, constipation, diarrhea and vomiting. Genitourinary: Negative for flank pain. Musculoskeletal: Negative for back pain and neck pain. Neurological: Negative for headaches. Psychiatric/Behavioral: Negative for agitation, behavioral problems and confusion. Physical Exam  Constitutional:       General: He is awake. He is not in acute distress. Appearance: Normal appearance. HENT:      Head: Normocephalic and atraumatic. Nose: Nose normal.   Eyes:      General: Vision grossly intact. Gaze aligned appropriately. Right eye: No discharge. Left eye: No discharge. Extraocular Movements: Extraocular movements intact. Conjunctiva/sclera: Conjunctivae normal.   Neck:      Musculoskeletal: Full passive range of motion without pain, normal range of motion and neck supple. Cardiovascular:      Rate and Rhythm: Normal rate and regular rhythm. Pulses: Normal pulses. Heart sounds: Normal heart sounds. Pulmonary:      Effort: Pulmonary effort is normal.      Breath sounds: Normal breath sounds. Abdominal:      General: There is no distension. There are no signs of injury. Palpations: Abdomen is soft. Tenderness: There is no abdominal tenderness. Musculoskeletal: Normal range of motion. General: No deformity or signs of injury. Skin:     General: Skin is warm. Neurological:      General: No focal deficit present. Mental Status: He is alert, oriented to person, place, and time and easily aroused. Mental status is at baseline. Motor: Motor function is intact. Coordination: Coordination is intact. Gait: Gait is intact. Psychiatric:         Attention and Perception: Attention and perception normal.         Mood and Affect: Mood and affect normal.         Speech: Speech normal.         Behavior: Behavior normal. Behavior is cooperative. Thought Content:  Thought content normal.         Cognition and Memory: Cognition normal.         Judgment: Judgment normal.       LABS:    CBC:   Recent Labs     02/02/20 0445 02/03/20 0544 02/04/20 0447   WBC 5.9 8.9 5.4   HGB 9.8* 9.9* 9.8*   HCT 29.6* 30.3* 29.7*    135 110*   MCV 97.5 97.9 97.2     Renal:    Recent Labs     02/02/20 0445 02/03/20 0544 02/04/20 0447    137 133*   K 4.5 4.8 4.6    104 100   CO2 21 20* 21   BUN 35* 35* 31*   CREATININE 0.8 1.1 1.1   GLUCOSE 139* 108* 97   CALCIUM 8.4 8.4 8.4   ANIONGAP 12 13 12     Hepatic:   Recent Labs     02/01/20 2050   AST 20   ALT 22   BILITOT 0.3   PROT 7.2   LABALBU 3.5   ALKPHOS 254*     Troponin:   Recent Labs     02/01/20 2050 02/01/20  2305   TROPONINI 0.03* 0.03*     BNP: No results for input(s): BNP in the last 72 hours. Lipids: No results for input(s): CHOL, HDL in the last 72 hours. Invalid input(s): LDLCALCU, TRIGLYCERIDE  ABGs:  No results for input(s): PHART, KTE7OCT, PO2ART, LET2MFX, BEART, THGBART, Q0CTLAUG, MVY3JEW in the last 72 hours. INR: No results for input(s): INR in the last 72 hours. Lactate: No results for input(s): LACTATE in the last 72 hours. Cultures:  -----------------------------------------------------------------  RAD:   XR CHEST STANDARD (2 VW)   Final Result      Left persistent pleural-based consolidation and pleural effusion. Slightly increased compared to prior study      Prior sternotomy. Right lung remains clear. Assessment/Plan:     77M w/ PMH CAD s/p CABG, BPH, GERD, back pain, A-fib, CKD, venous stasis B/L LE wounds, and HLD presented on 02/01/20 w/ urinary retention after being unable to replace schneider.      1. UTI 2/2 chronic indwelling catheter  - Hx of multiple UTIs 2/2 chronic schneider (MDRO, carbapenem/zosyn sensitive). - UA (02/01/20): Leukocyte (+) / Nitrite (+). WBC . Pseudomonas (+). - Prior cultures: (+) for Proteus penneri (11/19/19, 10/26/19), Pseudomonas (01/03/19), and Proteus mirabilis (01/03/19). - No white count.  WBC 5.9 > 8.9 > 5.4.  - Plan: Continue zosyn 3.375g IV q8hr. Plan for discharge w/ PO ciprofloxacin.     2. Urinary retention  - Cysto (01/2019): Likely neurogenic bladder. Small prostate.  - 1.8 L UOP last 24 hours w/ catheter in place.  - Plan: Tamsulosin 0.4 mg QD. Voiding trial tomorrow (02/05). Possible suprapubic catheter if voiding trial fails. Will hold bethanechol for now d/t bradycardia this AM (HR 47). 3. Bradycardia  - HR 47-54 this AM.  - Plan: Hold bethanechol for now. Ordered 12-lead EKG, TSH. Monitor on tele. 3. ALIS  - Cr 0.6 > 0.8 > 1.1 > 1.1.  - May be resolving. Continue  ml/hr.     4. Venous stasis wounds (chronic)  - Wounds on b/l LE wounds. - Plan: Routine wound care. Consider podiatry consult.     5. CAD s/p CABG:   - Continue  mg QD.     6. HTN  - BP controlled. 124/65 this AM.  - Imdur 30 mg QD.     7. HLD  - Atorvastatin 80 mg qPM.     8. Functional quadriplegia with Neuromuscular spasticity  - Intrathecal baclofen pump     9. Neuropathy  - Gabapentin 3600 mg QD.     10. GERD  - Famotidine 20 mg BID.      11. Iron deficiency  - Iron 325 mg QD.     12. Insomnia  - Melatonin 3 mg qPM.  - Ambien 5 mg qPM.     13. PPX  - Heparin. Code Status: Full code  FEN: Cardiac  DISPO: Floor    [facility] DO Jeremy, PGY-1  02/04/20  11:17 AM    This patient has been staffed and discussed with Hafsa Lagunas MD.     Patient seen and examined, plan of care discussed with residents. Agree with their assessment and plan with following addendum:  Patient was more awake today after getting reduced doses of melatonin and ambien. Still having post obstructive diuresis, creatinine stabilized at 1.1. cont with IVF and IV antibiotics. Stopped urecholine due to bradycardia. Repeat TSH.      Hafsa Lagunas

## 2020-02-04 NOTE — PROGRESS NOTES
Resident in to see pt. Discussed heart rate in 40s this am with pt exhibiting no symptoms. Current heart rate 53. Resident stated give imdur but hold bethanechol due to possibility of it decreasing heart rate. Wioll continue to monitor.

## 2020-02-04 NOTE — PROGRESS NOTES
Perfect serv to Dr Stuart Robb to report STAT EKG showed marked sinus bradycardia with 1st degree AV block with incomplete right bundle branch block possible anterior infarct age undetermined. Awaiting orders.  Electronically signed by Rio Cool RN on 2/4/2020 at 5:43 PM

## 2020-02-04 NOTE — PROGRESS NOTES
Call from 4S to report pt is having rhythm change of PVC on P, QRS and T waves. Placed an order for STAT EKG. .physician notified via perfect serve.   Electronically signed by Rio Cool RN on 2/4/2020 at 5:10 PM

## 2020-02-04 NOTE — PROGRESS NOTES
Urology Attending Progress Note      Subjective: bradycardic this AM. Weak. Vitals:  /64   Pulse 54   Temp 97.5 °F (36.4 °C) (Oral)   Resp 16   Ht 5' 11\" (1.803 m)   Wt 186 lb (84.4 kg)   SpO2 96%   BMI 25.94 kg/m²   Temp  Av.4 °F (36.3 °C)  Min: 96.4 °F (35.8 °C)  Max: 98.2 °F (36.8 °C)    Intake/Output Summary (Last 24 hours) at 2020 1038  Last data filed at 2020 0254  Gross per 24 hour   Intake 100 ml   Output 1825 ml   Net -1725 ml       Exam: abd soft and non-tender. Schneider draining clear urine     Labs:  WBC:    Lab Results   Component Value Date    WBC 5.4 2020     Hemoglobin/Hematocrit:    Lab Results   Component Value Date    HGB 9.8 2020    HCT 29.7 2020     BMP:    Lab Results   Component Value Date     2020    K 4.6 2020     2020    CO2 21 2020    BUN 31 2020    LABALBU 3.5 2020    CREATININE 1.1 2020    CALCIUM 8.4 2020    GFRAA >60 2020    LABGLOM >60 2020     PT/INR:    Lab Results   Component Value Date    PROTIME 12.0 2019    INR 1.03 2019     PTT:    Lab Results   Component Value Date    APTT 32.0 2019   [APTT        Urine Culture:  Pseudomonas. Blood Culture:  NGTD    Antibiotic Therapy:  Zosyn     Imaging: no new       Impression/Plan: 66 yo M with chronic schneider 2/2 possible NGB.  Cysto showed a small prostate      -bradycardic this AM. Holding urecholine   -labs WNL  -continue abx  -continue flomax  -will consider voiding trial later this week once medically stable     TIM Blum - CNP

## 2020-02-04 NOTE — PROGRESS NOTES
Nurse and PCA in to complete bathing and return pt back to bed. Skin tear noted to right elbow was bleeding at this time. Area dressed with dry dressing. Unsure if this is a new skin tear or if it was previously there and started to bleed at that time. Pt was unsure of when it occurred.   Electronically signed by Bertram Meneses RN on 2/4/2020 at 6:25 PM

## 2020-02-04 NOTE — PROGRESS NOTES
Pt A&O, VSS. Currently up in chair. Iglesias remains intact w/ adequate output. IV fluids infusing. Intermittent IV abx. Tolerating diet well. Denies pain. Leg wounds remain covered, dressing CDI. Sleeping. No other requests at this time, will continue to monitor.

## 2020-02-04 NOTE — ADT AUTH CERT
Utilization Reviews         Urinary Tract Infection (UTI) - Care Day 4 (2/4/2020) by Kiana Raymundo, RN         Review Status Review Entered   Completed 2/4/2020 12:55       Criteria Review      Care Day: 4 Care Date: 2/4/2020 Level of Care:    Guideline Day 3    Clinical Status    ( ) * Hemodynamic stability    2/4/2020 12:55 PM EST by Alicia Jain      HR sustained 47-54    (X) * Mental status at baseline    ( ) * Antibiotic regimen for next level of care established    2/4/2020 12:55 PM EST by Sara Busch iv q8hr    (X) * Urine output adequate    (X) * Renal function at baseline or acceptable for next level of care    (X) * Pain absent or managed    (X) * Oral intake adequate    (X) * Fever absent or acceptable for next level of care    (X) * Vomiting absent    ( ) * Discharge plans and education understood    Activity    ( ) * Ambulatory    Routes    (X) * Oral hydration, medications, [I] and diet    Interventions    (X) Renal function tests, urinalysis    Medications    (X) Antibiotics [J]    2/4/2020 12:55 PM EST by Alicia Jain      zosyn iv q8hr    (X) Possible analgesics    * Milestone   Additional Notes   2/4       96.4 (35.8)  15  47Abnormal  124/65  -  Sitting;Up in chair  -  -  97  None (Room air)       Scheduled Meds:melatonin, 3 mg, Oral, Nightly   zolpidem, 5 mg, Oral, Nightly   heparin (porcine), 5,000 Units, Subcutaneous, 3 times per day   [Held by provider] bethanechol, 25 mg, Oral, TID   tamsulosin, 0.4 mg, Oral, Daily   aspirin, 325 mg, Oral, Nightly   ferrous sulfate, 325 mg, Oral, Daily with breakfast   atorvastatin, 80 mg, Oral, Nightly   gabapentin, 600 mg, Oral, Daily   gabapentin, 1,200 mg, Oral, Lunch   gabapentin, 1,800 mg, Oral, Nightly   isosorbide mononitrate, 30 mg, Oral, Daily   famotidine, 20 mg, Oral, BID   sodium chloride flush, 10 mL, Intravenous, 2 times per day   piperacillin-tazobactam, 3.375 g, Intravenous, Q8H      Continuous Infusions:sodium chloride Last Rate: 100 mL/hr (02/04/20 1111)      PRN Meds:.niacin, sodium chloride flush, magnesium hydroxide, ondansetron, acetaminophen      Urine Culture [[id number]] (Abnormal) Collected: 02/01/20 2149      Specimen: Urine, clean catch Updated: 02/04/20 0817     Urine Culture, Routine >50,000 CFU/ml mixed skin/urogenital anne. No further workup     Organism Pseudomonas aeruginosa     Urine Culture, Routine >100,000 CFU/ml     Narrative:       ORDER#: [id number]                          ORDERED BY: Isaias Murdock   SOURCE: Urine Clean Catch                  COLLECTED:  02/01/20 21:49   ANTIBIOTICS AT [facility]. :                      RECEIVED :  02/02/20 08:44   Performed at:   [facility]   [address] [facility] [address]   Phone [phone]     Basic Metabolic Panel w/ Reflex to MG [[id number]] (Abnormal) Collected: 02/04/20 0447     Specimen: Blood Updated: 02/04/20 0549     Sodium 133 mmol/L      Potassium reflex Magnesium 4.6 mmol/L      Chloride 100 mmol/L      CO2 21 mmol/L      Anion Gap 12     Glucose 97 mg/dL      BUN 31 mg/dL      CREATININE 1.1 mg/dL      GFR Non- >60     GFR African American >60     Calcium 8.4 mg/dL      Narrative:       Performed at:    [facility]   [address] [facility] [address]   Phone [phone]     CBC auto differential [[id number]] (Abnormal) Collected: 02/04/20 0447     Specimen: Blood Updated: 02/04/20 0527     WBC 5.4 K/uL      RBC 3.05 M/uL      Hemoglobin 9.8 g/dL      Hematocrit 29.7 %      MCV 97.2 fL      MCH 32.2 pg      MCHC 33.1 g/dL      RDW 14.8 %      Platelets 268 K/uL      MPV 9.8 fL      Neutrophils % 67.8 %      Lymphocytes % 18.2 %      Monocytes % 7.0 %      Eosinophils % 6.4 %      Basophils % 0.6 %      Neutrophils Absolute 3.6 K/uL      Lymphocytes Absolute 1.0 K/uL      Monocytes Absolute 0.4 K/uL      Eosinophils Absolute 0.3 K/uL      Basophils Absolute 0.0 K/uL       Urology note   Intake/Output Summary (Last 24 hours) at 2/4/2020 1038   Last data filed at 2/4/2020 0254   Gross per 24 hour   Intake 100 ml   Output 1825 ml   Net -1725 ml      Impression/Plan: 66 yo M with chronic schneider 2/2 possible NGB. Cysto showed a small prostate        -bradycardic this AM. Holding urecholine    -labs WNL   -continue abx   -continue flomax   -will consider voiding trial later this week once medically stable       Attending note   Interval history: Patient up in chair and alert this AM. Approximately 1.8 liters urine output in the last 24 hours (-3 L since admit). Patient currently denies any suprapubic pain and has no urinary symptoms with schneider in place. He denies current chest pain, headache, SOB, abdominal pain, diarrhea and constipation. Plan for voiding trial tomorrow.       Assessment/Plan:       79M w/ PMH CAD s/p CABG, BPH, GERD, back pain, A-fib, CKD, venous stasis B/L LE wounds, and HLD presented on 02/01/20 w/ urinary retention after being unable to replace schneider.        1. UTI   - Hx of multiple UTIs 2/2 chronic schneider (MDRO, carbapenem/zosyn sensitive). - UA (02/01/20): Leukocyte (+) / Nitrite (+). WBC . Pseudomonas (+). - Prior cultures: (+) for Proteus penneri (11/19/19, 10/26/19), Pseudomonas (01/03/19), and Proteus mirabilis (01/03/19). - No white count. WBC 5.9 > 8.9 > 5.4.   - Plan: Continue zosyn 3.375g IV q8hr. Plan for discharge w/ PO ciprofloxacin.       2. Urinary retention   - Cysto (01/2019): Likely neurogenic bladder. Small prostate.   - 1.8 L UOP last 24 hours w/ catheter in place.   - Plan: Tamsulosin 0.4 mg QD. Voiding trial tomorrow (02/05). Possible suprapubic catheter if voiding trial fails. Will hold bethanechol for now d/t bradycardia this AM (HR 47).        3. Bradycardia   - HR 47-54 this AM.   - Plan: Hold bethanechol for now. Ordered 12-lead EKG, TSH. Monitor on tele.       3.  ALIS   - Cr 0.6 > 0.8 > 1.1 > 1.1.   - May be Floor       Urology note   Intake/Output Summary (Last 24 hours) at 2/3/2020 1143   Last data filed at 2/2/2020 2253   Gross per 24 hour   Intake 240 ml   Output 750 ml   Net -510 ml      Impression/Plan: 68 yo M with chronic schneider 2/2 possible NGB. Cysto showed a small prostate        -labs WNL   -continue abx   -continue flomax and urecholine.    -will consider a voiding trial in 2-3 days. RN note   Dr Audelia Reveles in room to see pt.  Pt lethargic however easy to arouse.  Discussed holding noon neurotin due to lethargy.  Dr Audelia Reveles stated please hold.        RN note   IVF started due to hypotension.

## 2020-02-04 NOTE — CARE COORDINATION
Case Management Assessment           Initial Evaluation                Date / Time of Evaluation: 2/3/2020 7:42 PM                 Assessment Completed by: Tiago Solis    Patient Name: Sheldon Gore     YOB: 1940  Diagnosis: UTI (urinary tract infection) [N39.0]  UTI (urinary tract infection) [N39.0]     Date / Time: 2/1/2020  7:55 PM    Patient Admission Status: Inpatient    If patient is discharged prior to next notation, then this note serves as note for discharge by case management. [facility]CP[facility] Patient: No    Chart Reviewed: Yes  Patient/ Family Interviewed: Yes    Initial assessment completed at bedside with: pt    Hospitalization in the last 30 days: Yes    Emergency Contacts:  Extended Emergency Contact Information  Primary Emergency Contact: [place] [address] Phone: [phone]  Work Phone: [phone]  Mobile Phone: [phone]  Relation: Child  Secondary Emergency Contact: Dana Callahan  Address: [address]  Home Phone: [phone]  Relation: Other    Advance Directives:   Code Status: Full [address]: No    Financial  Payor: Cathy Erwin / Plan: [employer]MAYA HMO / Product Type: *No Product type* /     Pre-cert required for SNF: Yes    [facility] [phone] - F [phone]  [address] [place] [id number]  Phone: [phone] Fax: [phone]    [id number] Yisel Nicholson, [address] [place] [address]  Phone: [phone] Fax: [phone]      Potential assistance Purchasing Medications: Potential Assistance Purchasing Medications: No  Does Patient want to participate in local refill/ meds to beds program?: No    Meds To Beds General Rules:  1. Can ONLY be done Monday- Friday between 8:30am-5pm  2.  Prescription(s) must be in pharmacy by Notes: pt from home with daughter active with [employer], and [facility] Lucio Nabeeltab [phone]) 1.5 hours 2 days a week. Plans to return home with same services refused SNF. The Plan for Transition of Care is related to the following treatment goals of UTI (urinary tract infection) [N39.0]  UTI (urinary tract infection) [N39.0]    The Patient and/or patient representative Arminda Sánchez and his family were provided with a choice of provider and agrees with the discharge plan Yes    Freedom of choice list was provided with basic dialogue that supports the patient's individualized plan of care/goals and shares the quality data associated with the providers.  Yes      Care Transition patient: No    Gilma Garcia RN  [facility] [employer]  Case Management Department  Ph: [phone]   Fax: [phone]

## 2020-02-05 LAB
ANION GAP SERPL CALCULATED.3IONS-SCNC: 10 MMOL/L (ref 3–16)
BASOPHILS ABSOLUTE: 0 K/UL (ref 0–0.2)
BASOPHILS RELATIVE PERCENT: 0.7 %
BUN BLDV-MCNC: 28 MG/DL (ref 7–20)
CALCIUM SERPL-MCNC: 7.8 MG/DL (ref 8.3–10.6)
CHLORIDE BLD-SCNC: 105 MMOL/L (ref 99–110)
CO2: 19 MMOL/L (ref 21–32)
CREAT SERPL-MCNC: 1.1 MG/DL (ref 0.8–1.3)
EKG ATRIAL RATE: 43 BPM
EKG DIAGNOSIS: NORMAL
EKG P AXIS: 75 DEGREES
EKG P-R INTERVAL: 328 MS
EKG Q-T INTERVAL: 474 MS
EKG QRS DURATION: 96 MS
EKG QTC CALCULATION (BAZETT): 400 MS
EKG R AXIS: -4 DEGREES
EKG T AXIS: 64 DEGREES
EKG VENTRICULAR RATE: 43 BPM
EOSINOPHILS ABSOLUTE: 0.3 K/UL (ref 0–0.6)
EOSINOPHILS RELATIVE PERCENT: 9.2 %
GFR AFRICAN AMERICAN: >60
GFR NON-AFRICAN AMERICAN: >60
GLUCOSE BLD-MCNC: 84 MG/DL (ref 70–99)
HCT VFR BLD CALC: 26.2 % (ref 40.5–52.5)
HEMOGLOBIN: 8.5 G/DL (ref 13.5–17.5)
LACTIC ACID, SEPSIS: 0.6 MMOL/L (ref 0.4–1.9)
LYMPHOCYTES ABSOLUTE: 0.7 K/UL (ref 1–5.1)
LYMPHOCYTES RELATIVE PERCENT: 20.4 %
MCH RBC QN AUTO: 32.3 PG (ref 26–34)
MCHC RBC AUTO-ENTMCNC: 32.6 G/DL (ref 31–36)
MCV RBC AUTO: 99 FL (ref 80–100)
MONOCYTES ABSOLUTE: 0.3 K/UL (ref 0–1.3)
MONOCYTES RELATIVE PERCENT: 9.4 %
NEUTROPHILS ABSOLUTE: 2 K/UL (ref 1.7–7.7)
NEUTROPHILS RELATIVE PERCENT: 60.3 %
ORGANISM: ABNORMAL
PDW BLD-RTO: 14.5 % (ref 12.4–15.4)
PLATELET # BLD: 106 K/UL (ref 135–450)
PMV BLD AUTO: 10.1 FL (ref 5–10.5)
POTASSIUM REFLEX MAGNESIUM: 5.4 MMOL/L (ref 3.5–5.1)
RBC # BLD: 2.65 M/UL (ref 4.2–5.9)
SODIUM BLD-SCNC: 134 MMOL/L (ref 136–145)
URINE CULTURE, ROUTINE: ABNORMAL
WBC # BLD: 3.3 K/UL (ref 4–11)

## 2020-02-05 PROCEDURE — 97530 THERAPEUTIC ACTIVITIES: CPT

## 2020-02-05 PROCEDURE — 97110 THERAPEUTIC EXERCISES: CPT

## 2020-02-05 PROCEDURE — 83605 ASSAY OF LACTIC ACID: CPT

## 2020-02-05 PROCEDURE — 80048 BASIC METABOLIC PNL TOTAL CA: CPT

## 2020-02-05 PROCEDURE — 87086 URINE CULTURE/COLONY COUNT: CPT

## 2020-02-05 PROCEDURE — 36415 COLL VENOUS BLD VENIPUNCTURE: CPT

## 2020-02-05 PROCEDURE — 2580000003 HC RX 258: Performed by: STUDENT IN AN ORGANIZED HEALTH CARE EDUCATION/TRAINING PROGRAM

## 2020-02-05 PROCEDURE — 6370000000 HC RX 637 (ALT 250 FOR IP): Performed by: INTERNAL MEDICINE

## 2020-02-05 PROCEDURE — 6360000002 HC RX W HCPCS: Performed by: STUDENT IN AN ORGANIZED HEALTH CARE EDUCATION/TRAINING PROGRAM

## 2020-02-05 PROCEDURE — 1200000000 HC SEMI PRIVATE

## 2020-02-05 PROCEDURE — 2580000003 HC RX 258: Performed by: INTERNAL MEDICINE

## 2020-02-05 PROCEDURE — 85025 COMPLETE CBC W/AUTO DIFF WBC: CPT

## 2020-02-05 PROCEDURE — 6370000000 HC RX 637 (ALT 250 FOR IP): Performed by: STUDENT IN AN ORGANIZED HEALTH CARE EDUCATION/TRAINING PROGRAM

## 2020-02-05 PROCEDURE — 93010 ELECTROCARDIOGRAM REPORT: CPT | Performed by: INTERNAL MEDICINE

## 2020-02-05 PROCEDURE — 6370000000 HC RX 637 (ALT 250 FOR IP): Performed by: UROLOGY

## 2020-02-05 PROCEDURE — 97535 SELF CARE MNGMENT TRAINING: CPT

## 2020-02-05 RX ORDER — CASTOR OIL AND BALSAM, PERU 788; 87 MG/G; MG/G
OINTMENT TOPICAL 2 TIMES DAILY
Status: DISCONTINUED | OUTPATIENT
Start: 2020-02-05 | End: 2020-02-13 | Stop reason: HOSPADM

## 2020-02-05 RX ADMIN — MEROPENEM 1 G: 1 INJECTION, POWDER, FOR SOLUTION INTRAVENOUS at 22:30

## 2020-02-05 RX ADMIN — GABAPENTIN 1800 MG: 600 TABLET, FILM COATED ORAL at 21:33

## 2020-02-05 RX ADMIN — FAMOTIDINE 20 MG: 20 TABLET ORAL at 08:07

## 2020-02-05 RX ADMIN — FERROUS SULFATE TAB 325 MG (65 MG ELEMENTAL FE) 325 MG: 325 (65 FE) TAB at 08:07

## 2020-02-05 RX ADMIN — Medication 3 MG: at 21:33

## 2020-02-05 RX ADMIN — LEVOTHYROXINE SODIUM 50 MCG: 0.05 TABLET ORAL at 06:09

## 2020-02-05 RX ADMIN — GABAPENTIN 1200 MG: 600 TABLET, FILM COATED ORAL at 14:05

## 2020-02-05 RX ADMIN — ATORVASTATIN CALCIUM 80 MG: 80 TABLET, FILM COATED ORAL at 21:33

## 2020-02-05 RX ADMIN — SODIUM CHLORIDE: 9 INJECTION, SOLUTION INTRAVENOUS at 06:06

## 2020-02-05 RX ADMIN — GABAPENTIN 600 MG: 600 TABLET, FILM COATED ORAL at 08:07

## 2020-02-05 RX ADMIN — ISOSORBIDE MONONITRATE 30 MG: 30 TABLET, EXTENDED RELEASE ORAL at 08:07

## 2020-02-05 RX ADMIN — Medication 10 ML: at 21:35

## 2020-02-05 RX ADMIN — HEPARIN SODIUM 5000 UNITS: 5000 INJECTION INTRAVENOUS; SUBCUTANEOUS at 06:00

## 2020-02-05 RX ADMIN — TAMSULOSIN HYDROCHLORIDE 0.4 MG: 0.4 CAPSULE ORAL at 08:07

## 2020-02-05 RX ADMIN — FAMOTIDINE 20 MG: 20 TABLET ORAL at 21:34

## 2020-02-05 RX ADMIN — HEPARIN SODIUM 5000 UNITS: 5000 INJECTION INTRAVENOUS; SUBCUTANEOUS at 14:05

## 2020-02-05 RX ADMIN — CASTOR OIL AND BALSAM, PERU: 788; 87 OINTMENT TOPICAL at 21:42

## 2020-02-05 RX ADMIN — MEROPENEM 1 G: 1 INJECTION, POWDER, FOR SOLUTION INTRAVENOUS at 06:17

## 2020-02-05 RX ADMIN — MEROPENEM 1 G: 1 INJECTION, POWDER, FOR SOLUTION INTRAVENOUS at 14:04

## 2020-02-05 RX ADMIN — ZOLPIDEM TARTRATE 5 MG: 5 TABLET ORAL at 21:33

## 2020-02-05 RX ADMIN — HEPARIN SODIUM 5000 UNITS: 5000 INJECTION INTRAVENOUS; SUBCUTANEOUS at 21:35

## 2020-02-05 RX ADMIN — ASPIRIN 325 MG ORAL TABLET 325 MG: 325 PILL ORAL at 21:33

## 2020-02-05 ASSESSMENT — ENCOUNTER SYMPTOMS
SHORTNESS OF BREATH: 0
ABDOMINAL PAIN: 0
BACK PAIN: 0
VOMITING: 0
EYE PAIN: 0
CONSTIPATION: 0
DIARRHEA: 0
SINUS PAIN: 0

## 2020-02-05 ASSESSMENT — PAIN SCALES - GENERAL
PAINLEVEL_OUTOF10: 0

## 2020-02-05 ASSESSMENT — VISUAL ACUITY: OU: 1

## 2020-02-05 NOTE — PROGRESS NOTES
[facility] Wound Ostomy Continence Nurse  Follow-up Progress Note       NAME:  Christina Skelton [facility]  MEDICAL RECORD NUMBER:  [id number]  AGE:  67 y.o. GENDER:  male  :  1940  TODAY'S DATE:  2020    Subjective:   Wound Identification: sacrum, buttocks  Wound Type:IAD/MASD  Contributing Factors:incontinence, severe weakness, bedridden      Patient Goal of Care:  [x] Wound Healing  [] Odor Control  [x] Palliative Care  [] Pain Control   [] Other:     Objective:    BP (!) 107/48   Pulse 65   Temp 97.4 °F (36.3 °C) (Oral)   Resp 16   Ht 5' 11\" (1.803 m)   Wt 186 lb (84.4 kg)   SpO2 95%   BMI 25.94 kg/m²   Terrance Risk Score: Terrance Scale Score: 15  Assessment:   Measurements:  Wound 19 Knee Anterior;Right dry thick intact scab (Active)   Wound Other 2/3/2020 11:40 AM   Dressing/Treatment Open to air 2020  2:43 PM   Wound Assessment Dry; Intact; [place] [address] 2020  3:41 PM   Drainage Amount None 2020  3:22 PM   Odor None 2020  3:22 PM   Number of days: 77       Wound 19 Sacrum Left -buttock - IAD/MASD partial thick slight denuded inside scarring (Active)   Wound Other 2020  5:09 PM   Dressing Status Clean;Dry; Intact 2020  2:43 PM   Dressing Changed Changed/New 2020  3:13 AM   Dressing/Treatment Other (comment) 2020  5:09 PM   Wound Cleansed Soap and water 2020  7:48 AM   Dressing Change Due 20  5:44 PM   Wound Assessment Red;Pink 2020  5:09 PM   Drainage Amount None 2020  5:09 PM   Odor None 2020  5:09 PM   Margins Undefined edges 2020  5:09 PM   Usha-wound Assessment Yellow-brown;Hyperpigmented 2020  5:09 PM   Non-staged Wound Description Partial thickness 2020  5:09 PM   Number of days: 49       Response to treatment:  No verbal response  Plan:   Plan of Care: Wound 19 Knee Anterior;Right dry thick intact scab-Dressing/Treatment: Open to air  Wound 19 Sacrum Left -buttock - IAD/MASD partial thick slight denuded inside scarring-Dressing/Treatment: Other (comment)(venelex ordered)    Specialty Bed Required : in place  [x] Low Air Loss   [x] Pressure Redistribution  [] Fluid Immersion  [] Bariatric  [] Total Pressure Relief  [] Other:     Current Diet: DIET CARDIAC;    Patient/Caregiver Teaching: etiology, treatment  Level of patient/caregiver understanding able to:   [] Indicates understanding       [x] Needs reinforcement-no response[] Unsuccessful      [] Verbal Understanding  [] Demonstrated understanding       [] No evidence of learning  [] Refused teaching         [] N/A       Electronically signed by Tj Mckeon RN, CWOCN on 2/5/2020 at 5:10 PM

## 2020-02-05 NOTE — PROGRESS NOTES
Physical Therapy  Facility/Department: [facility]'[address]  Daily Treatment Note  NAME: Adri Barr  : 1940  MRN: [id number]    Date of Service: 2020    Discharge Recommendations:    Adri Barr scored a 10/24 on the AM-PAC short mobility form. Current research shows that an AM-PAC score of 17 or less is typically not associated with a discharge to the patient's home setting. Based on the patients AM-PAC score and their current functional mobility deficits, it is recommended that the patient have 3-5 sessions per week of Physical Therapy at d/c to increase the patients independence. PT Equipment Recommendations  Equipment Needed: No    Assessment   Body structures, Functions, Activity limitations: Decreased functional mobility   Assessment: Pt requiring max Ax1+Mod Ax1 for transfers this session. Pt limited by BLE weakness and decreased endurance. Pt is below his baseline and would benefit from continued skilled PT to maximize safety and independence with functional mobility. Will continue to follow. Treatment Diagnosis: impaired functional mobility  Prognosis: Good;Fair  Decision Making: Medium Complexity  PT Education: Goals;PT Role;Plan of Care;General Safety;Transfer Training;Functional Mobility Training  REQUIRES PT FOLLOW UP: Yes  Activity Tolerance  Activity Tolerance: Patient Tolerated treatment well;Patient limited by endurance     Patient Diagnosis(es): The encounter diagnosis was Urinary tract infection associated with indwelling urethral catheter, initial encounter ([facility] [address]      has a past medical history of BPH (benign prostatic hyperplasia), Carotid stenosis, Cellulitis, Chronic back pain, Diverticular disease, Erectile dysfunction, GERD (gastroesophageal reflux disease), History of atrial fibrillation, Hypercholesteremia, Hypertension, Lower GI bleed, MDRO (multiple drug resistant organisms) resistance, Neuromuscular disorder ([address] Renal insufficiency, Risk for falls, Tinea corporis, and Vitamin B12 deficiency. has a past surgical history that includes back surgery (2006); Foot surgery; Coronary artery bypass graft (12/13/2013); hip surgery (Right, 5/1/2015); and Cystoscopy (N/A, 1/10/2019). Restrictions  Position Activity Restriction  Other position/activity restrictions: up with assistance   Subjective   General  Chart Reviewed: Yes  Additional Pertinent Hx: PMH: A-fib, HTN, CABG, indwelling urinary catheter, back pain, spasticity, intrathecal baclofen pump. Pt came to ED for catheter replacement, found to have UTI. Per EMS, pt's home in poor condition. Family / Caregiver Present: No  Referring Practitioner: Lugenia Nap  Subjective  Subjective: Pt supine in bed, reporting minor pain in feet (unrated) & agreeable to PT. Objective   Bed mobility  Sit to Supine: Contact guard assistance(with VCs for tech.  Increased time and effort)  Scooting: Stand by assistance(increased time and effort)  Transfers  Sit to Stand: 2 Person Assistance(Max Ax1 + Mod Ax1 x4 trials for pericare and to les pants)  Stand to sit: 2 Person Assistance(Max Ax1 + Mod Ax1)  Stand Pivot Transfers: 2 Person Assistance(Mod Ax1 + min Ax1)  Ambulation  Ambulation?: No        Exercises  Comments: AP, LAQ, seated marches x20 BLE       AM-PAC Score  AM-PAC Inpatient Mobility Raw Score : 10 (02/05/20 1126)  AM-PAC Inpatient T-Scale Score : 32.29 (02/05/20 1126)  Mobility Inpatient CMS 0-100% Score: 76.75 (02/05/20 1126)  Mobility Inpatient CMS G-Code Modifier : CL (02/05/20 1126)          Goals  Short term goals  Time Frame for Short term goals: D/C  Short term goal 1: sit<->stand SBA ongoing  Short term goal 2: bed<->chair SBA ongoing  Patient Goals   Patient goals : to go home    Plan    Plan  Times per week: 2-5  Current Treatment Recommendations: Functional Mobility Training, Transfer Training, Safety Education & Training, Endurance Training  Safety Devices  Type of devices: Call light within reach, Chair alarm in place, Left in chair, Nurse notified, Gait belt     Therapy Time   Individual Concurrent Group Co-treatment   Time In 0933         Time Out 1026         Minutes 53           Timed Code Treatment Minutes:  53    Total Treatment Minutes:  53    If the patient is discharged before the next treatment session, this note will serve as the discharge summary.      Asim Garland, PT, DPT [id number]

## 2020-02-05 NOTE — CONSULTS
of atrial fibrillation     Hypercholesteremia     Hypertension     Lower GI bleed     MDRO (multiple drug resistant organisms) resistance 10/26/2019    urine    Neuromuscular disorder (HCC)     spasticity    Renal insufficiency     Risk for falls     uses walker    Tinea corporis 12/2013    LLE    Vitamin B12 deficiency        Past Surgical History:   Procedure Laterality Date    BACK SURGERY  2006    lower lumbar    CORONARY ARTERY BYPASS GRAFT  12/13/2013    CABG x 5 (Dr Miguel A Franco)   [address] N/A 1/10/2019    CYSTOSCOPY performed by Libby Knapp MD at [facility] [address] Right 5/1/2015    [facility]       Family History   Problem Relation Age of Onset    Heart Disease Father         reports that he quit smoking about 50 years ago. He has never used smokeless tobacco. He reports that he does not drink alcohol or use drugs.     Allergies:  Bactrim [sulfamethoxazole-trimethoprim]    Current Medications:    melatonin tablet 3 mg, Nightly  zolpidem (AMBIEN) tablet 5 mg, Nightly  levothyroxine (SYNTHROID) tablet 50 mcg, Daily  meropenem (MERREM) 1 g in sodium chloride 0.9 % 100 mL IVPB (mini-bag), Q8H  heparin (porcine) injection 5,000 Units, 3 times per day  0.9 % sodium chloride infusion, Continuous  [Held by provider] bethanechol (URECHOLINE) tablet 25 mg, TID  tamsulosin (FLOMAX) capsule 0.4 mg, Daily  aspirin tablet 325 mg, Nightly  ferrous sulfate tablet 325 mg, Daily with breakfast  atorvastatin (LIPITOR) tablet 80 mg, Nightly  gabapentin (NEURONTIN) tablet 600 mg, Daily  gabapentin (NEURONTIN) tablet 1,200 mg, Lunch  gabapentin (NEURONTIN) tablet 1,800 mg, Nightly  isosorbide mononitrate (IMDUR) extended release tablet 30 mg, Daily  niacin (NIASPAN) extended release tablet 500 mg, Nightly PRN  famotidine (PEPCID) tablet 20 mg, BID  sodium chloride flush 0.9 % injection 10 mL, 2 times per day  sodium chloride flush 0.9 % injection 10 mL, PRN  magnesium hydroxide (MILK OF MAGNESIA) 400 MG/5ML suspension 30 mL, Daily PRN  ondansetron (ZOFRAN) injection 4 mg, Q6H PRN  acetaminophen (TYLENOL) tablet 650 mg, Q4H PRN        Review of Systems:   14 point ROS obtained but were negative except mentioned in HPI      Physical exam:     Vitals:  /68   Pulse 78   Temp 98 °F (36.7 °C) (Oral)   Resp 16   Ht 5' 11\" (1.803 m)   Wt 186 lb (84.4 kg)   SpO2 93%   BMI 25.94 kg/m²   Constitutional:  OAA X2 (self, place), NAD  Skin: noted bilat leg chronic skin venous changes, multiple eczematous spots  Neck: no jvd noted, limited ROM  Cardiovascular:  S1, S2 no r/g, 2/6 holosystolic murmur in LUSB  Respiratory: CTA B without w/r/r  Abdomen:  +bs, soft, nt, nd  Ext: chornic lower extremity edema up to knees  Psychiatric: mood and affect appropriate, occasional tangent speech. Musculoskeletal:  Limited spastic ROM, reduced muscle bulk. Data:   Labs:  CBC:   Recent Labs     02/04/20 0447 02/04/20 2216 02/05/20 0444   WBC 5.4 3.5* 3.3*   HGB 9.8* 9.9* 8.5*   * 123* 106*     BMP:    Recent Labs     02/04/20 0447 02/04/20 2216 02/05/20  0444   * 134* 134*   K 4.6 5.0 5.4*    103 105   CO2 21 21 19*   BUN 31* 30* 28*   CREATININE 1.1 1.0 1.1   GLUCOSE 97 118* 84     Ca/Mg/Phos:   Recent Labs     02/04/20 0447 02/04/20 2216 02/05/20  0444   CALCIUM 8.4 8.2* 7.8*   MG  --  2.10  --    Adjusted Ca+2 today to 8.7 g/dl    Hepatic:   Recent Labs     02/04/20 2216   AST 20   ALT 22   BILITOT 0.3   ALKPHOS 184*     Troponin: No results for input(s): TROPONINI in the last 72 hours. BNP: No results for input(s): BNP in the last 72 hours. Lipids: No results for input(s): CHOL, TRIG, HDL, LDLCALC, LABVLDL in the last 72 hours. ABGs: No results for input(s): PHART, PO2ART, SYW8DAF in the last 72 hours. INR: No results for input(s): INR in the last 72 hours.   UA:No results for input(s): Tameka [address], [facility], [address] [facility] [address], [facility], [address] [place] [address], [facility], UROBILINOGEN, NITRU, LEUKOCYTESUR, Maralee Dyke in the last 72 hours. Urine Microscopic: No results for input(s): LABCAST, BACTERIA, COMU, HYALCAST, WBCUA, RBCUA, EPIU in the last 72 hours. Urine Culture: No results for input(s): LABURIN in the last 72 hours. Urine Chemistry:   Recent Labs     02/03/20  1534   LABCREA 84.3   NAUR <20             IMAGING:  XR CHEST PORTABLE   Final Result      Unchanged small left pleural effusion and left basilar atelectasis versus pneumonia. XR CHEST STANDARD (2 VW)   Final Result      Left persistent pleural-based consolidation and pleural effusion. Slightly increased compared to prior study      Prior sternotomy. Right lung remains clear. Assessment/Plan   1. ALIS: UOP has been appropriate with schneider, Cr appears at baseline since chronic schneider placement on 1/19. BUN increase likely explained by antibiotics in the setting of complicated UTI. Urine sodium <20 might indicate volume depletion. 2. Hyperkalemia: In the setting of UTI, likely due to acute phase infection, especially in the setting of co-comittant zosyn and meropenem. 3. Acid- base/ Electrolyte imbalance: primary team concerned for RTA 4 due to recurrent hyperkalemia in the setting of mildly reduced bicarb during hospitalizations. 4. Normocytic anemia: pt chronically ill with baseline hgb of 11 before multiple UTIs. 5. HTN: Currently managed by cardiology with Imdur. PLAN:  -Continue cardiac diet.  -Consider ordering urine anions and venous blood gas to assess RTA. -If potassium increases above 5.5, please order kayexalate x1.  -Switch from meropenem + zosyn as soon as possible. -Gentle hydration as needed.     Thank you for allowing us to participate in care of Tushar Block     **PRELIMINARY PLAN WILL BE DISCUSSED WITH ATTENDINGJamila Kearney  [facility]    Pt seen and examined     Change IVF with bicarb   Low K diet   Monitor labs     Amarjit Quiroga MD

## 2020-02-05 NOTE — PROGRESS NOTES
Urology Attending Progress Note      Subjective: still with bradycardia. No complaints     Vitals:  /69   Pulse 72   Temp 97.4 °F (36.3 °C) (Oral)   Resp 15   Ht 5' 11\" (1.803 m)   Wt 186 lb (84.4 kg)   SpO2 94%   BMI 25.94 kg/m²   Temp  Av.4 °F (34.7 °C)  Min: 92.7 °F (33.7 °C)  Max: 97.4 °F (36.3 °C)    Intake/Output Summary (Last 24 hours) at 2020 1049  Last data filed at 2020 0949  Gross per 24 hour   Intake 1178 ml   Output 4400 ml   Net -3222 ml       Exam: abd soft and non-tender. Schneider draining clear urine. Labs:  WBC:    Lab Results   Component Value Date    WBC 3.3 2020     Hemoglobin/Hematocrit:    Lab Results   Component Value Date    HGB 8.5 2020    HCT 26.2 2020     BMP:    Lab Results   Component Value Date     2020    K 5.4 2020     2020    CO2 19 2020    BUN 28 2020    LABALBU 2.9 2020    CREATININE 1.1 2020    CALCIUM 7.8 2020    GFRAA >60 2020    LABGLOM >60 2020     PT/INR:    Lab Results   Component Value Date    PROTIME 12.0 2019    INR 1.03 2019     PTT:    Lab Results   Component Value Date    APTT 32.0 2019   [APTT        Urine Culture:  Pending     Blood Culture:  NGTD    Antibiotic Therapy:  Merrem     Imaging: no new       Impression/Plan:  66 yo M with chronic schneider 2/2 possible NGB.  Cysto showed a small prostate      -bradycardic this AM. Holding urecholine   -labs WNL  -continue abx  -continue flomax  -will consider voiding trial later this week once medically stable     TIM Howell - CNP

## 2020-02-05 NOTE — PLAN OF CARE
Problem: Sensory:  Goal: General experience of comfort will improve  Description  General experience of comfort will improve  Outcome: Ongoing  Note:   Patient continues to have a schneider in place at this time. No flank pain noted. Patient urine remains clear and yellow. Possible voiding trial later today. Problem: Urinary Elimination:  Goal: Complications related to the disease process, condition or treatment will be avoided or minimized  Description  Complications related to the disease process, condition or treatment will be avoided or minimized  Outcome: Ongoing  Note:   Patient is still getting IV abx at this time.  Schneider remains in place

## 2020-02-05 NOTE — PROGRESS NOTES
Patient alert and oriented. Low Temperature  94 MD aware. Patient denies any pain or nausea. Patient received NS iv bolus of 2352 ml. Iglesias in place draining yellow urine. Patient in bed lowest position call light and bedside table within reach. All needs are met at this time. Patient aware to call if any help needed. Will continue to monitor  /71   Pulse 62   Temp 94.8 °F (34.9 °C) (Oral)   Resp 16   Ht 5' 11\" (1.803 m)   Wt 186 lb (84.4 kg)   SpO2 96%   BMI 25.94 kg/m²

## 2020-02-05 NOTE — PROGRESS NOTES
Patient A&O. VSS. Improved from overnight. Patient given full bath this AM and put into chair. Patient urine output is adequate, no voiding trial today. Urine remains yellow and clear. Patient skin has some swelling, scattered bruising and abrasions. Speciality bed ordered. /69   Pulse 72   Temp 97.4 °F (36.3 °C) (Oral)   Resp 15   Ht 5' 11\" (1.803 m)   Wt 186 lb (84.4 kg)   SpO2 94%   BMI 25.94 kg/m²     Patient denies any further needs at this time.  Bed is in the lowest position and call light is within reach    Electronically signed by Kecia Clinton RN on 2/5/2020 at 9:54 AM

## 2020-02-05 NOTE — PLAN OF CARE
Problem: Falls - Risk of:  Goal: Will remain free from falls  Description  Will remain free from falls  Outcome: Ongoing  Note:   Patient remains free from physical injury. Fall precautions in place: Patient in bed lowest position,wheels locked. 2/4 side rails up , alarm ctivated. Call light and beside table within reach. Will continue to monitor       Problem: Urinary Elimination:  Goal: Ability to reestablish a normal urinary elimination pattern will improve - after catheter removal  Description  Ability to reestablish a normal urinary elimination pattern will improve  Outcome: Ongoing  Note:   Iglesias in place draining adequate urine output. Patient received IV bolus and tolerates well.  Will continue to monitor

## 2020-02-05 NOTE — PROGRESS NOTES
Occupational Therapy  Facility/Department: [facility] [address] [place] [address]  Daily Treatment Note  NAME: Moriah Tomas  : 1940  MRN: [id number]    Date of Service: 2020    Discharge Recommendations:  Moriah Tomas scored a 16/24 on the AM-PAC ADL Inpatient form. Current research shows that an AM-PAC score of 17 or less is typically not associated with a discharge to the patient's home setting. Based on the patients AM-PAC score and their current ADL deficits, it is recommended that the patient have 3-5 sessions per week of Occupational Therapy at d/c to increase the patients independence. HOME HEALTH CARE: LEVEL 1 STANDARD    - Initial home health evaluation to occur within 24-48 hours, in patient home   - Therapy to evaluate with goal of regaining prior level of functioning   - Therapy to evaluate if patient has [address] needs for personal care          Assessment   Assessment: Pt from home with daughter and aid providing limited assistance. Pt requiring assist x2 for transfer from [facility] to [facility]. Pt is currently unsafe for homebound discharge and would benefit from continued OT for maximizing functional indpedence. If discharge home would benefit from 24 hr assist and HHOT. Continue OT per pOC  Treatment Diagnosis: impaired ADLs /functional transfers 2/2 UTI / schneider issues   Prognosis: Fair  OT Education: OT Role;Plan of Care;ADL Adaptive Strategies;Transfer Training  Patient Education: pt verb understandin - will need reinforcement. Pt with limited awarness of deficits  Activity Tolerance  Activity Tolerance: Patient Tolerated treatment well;Patient limited by fatigue  Activity Tolerance: Pt limited by LE weakness limiting transfers and mobility  Safety Devices  Safety Devices in place: Yes  Type of devices: Call light within reach; Chair alarm in place;Nurse notified; Left in chair       Restrictions  Position Activity Restriction  Other position/activity restrictions: up with assistance     Subjective General  Chart Reviewed: Yes  Patient assessed for rehabilitation services?: Yes  Additional Pertinent Hx: Admit 2/1 with UTI / Schneider cath dislodgement  Per EMS pt's living conditions are poor. Pt is w/c bound at baseline, Schneider cath placed in ED         PMHX: CAD s/p CABG x5, chronic back pain with intrathecal baclofen pump, Afib, BPH with chronic Schneider catheter, CKD, venous stasis of bilateral lower extremities  Response to previous treatment: Patient with no complaints from previous session  Family / Caregiver Present: No  Diagnosis: UTI/ Chronic Schneider cath dislodgement   Subjective  Subjective: Pt supine in bed and agreeable to OT treatment  Vital Signs  Patient Currently in Pain: Yes(Pain in LE secondary to edema. RN Aware)        Objective    ADL  Grooming: Setup(washing face seated EOB)  UE Bathing: Minimal assistance(assist for washing armpit and back seated EOB)  LE Bathing: Moderate assistance;Maximum assistance(Pt completing rich and thighs)  UE Dressing: Contact guard assistance(donning gown)  LE Dressing: Maximum assistance(donning briefs and pants. Pt reports aid assists at home twice a week)  Toileting: Maximum assistance(Pt incontinent of bowel and schneider placed)  Additional Comments: Pt unaware of bowel movement max A for clean up and brief change        Balance  Sitting Balance: Supervision(seated EOB for UE washing)  Standing Balance: Dependent/Total(Max A + Mod A for partial stance x4)  Standing Balance  Time: 1 min + 30 sec+ 10 sec + 1 min  Activity: functional transfer/partail stance x3 for clothing managment and buttocks cleanning  Functional Mobility  Functional Mobility Comments: Pt uses WC for mobility at baseline  Bed mobility  Sit to Supine: Contact guard assistance(with VCs for tech.  Increased time and effort)  Scooting: Stand by assistance(increased time and effort)  Transfers  Stand Pivot Transfers: Dependent/Total(from EOB to chair pulled close with increased time and effort with chair pulled close to EOB. )  Sit to stand: Dependent/Total(Max A+ Mod A x3 from EOB and recliner chair )  Very effortful for completion of partial stance x4            Plan  If pt discharges prior to next treatment, this note will serve as discharge summary  Plan  Times per week: 2-5x  Times per day: Daily  Current Treatment Recommendations: Functional Mobility Training, Patient/Caregiver Education & Training, Self-Care / ADL, Safety Education & Training, Endurance Training           AM-PAC Score        AM-[facility] Inpatient Daily Activity Raw Score: 16 (02/05/20 1033)  AM-PAC Inpatient ADL T-Scale Score : 35.96 (02/05/20 1033)  ADL Inpatient CMS 0-100% Score: 53.32 (02/05/20 1033)  ADL Inpatient CMS G-Code Modifier : CK (02/05/20 1033)    Goals (as determined and assessed by primary OT)  Short term goals  Time Frame for Short term goals: at d/c   Short term goal 1: Bed /chair transfers <> BSC - requiring Max A + Mod A 2/5  Short term goal 2: LE Dressing with SBA and AE prn - ongoing  Patient Goals   Patient goals : Go home        Therapy Time   Individual Concurrent Group Co-treatment   Time In 0933         Time Out 1026         Minutes 53         Timed Code Treatment Minutes: 53 Minutes   Total treatment Minutes: 48 min    [address] [place] [address], Ne, GUSMAN/L 179.8

## 2020-02-05 NOTE — PROGRESS NOTES
Net -[id number]      Review of Systems   Constitutional: Negative for fatigue and fever. HENT: Negative for ear pain and sinus pain. Eyes: Negative for pain. Respiratory: Negative for shortness of breath. Cardiovascular: Negative for chest pain. Gastrointestinal: Negative for abdominal pain, constipation, diarrhea and vomiting. Genitourinary: Negative for flank pain. Musculoskeletal: Negative for back pain and neck pain. Neurological: Negative for headaches. Psychiatric/Behavioral: Negative for agitation, behavioral problems and confusion. Physical Exam  Constitutional:       General: He is awake. He is not in acute distress. Appearance: Normal appearance. Comments: Somnolent difficult to stay awake. HENT:      Head: Normocephalic and atraumatic. Nose: Nose normal.   Eyes:      General: Vision grossly intact. Gaze aligned appropriately. Right eye: No discharge. Left eye: No discharge. Extraocular Movements: Extraocular movements intact. Conjunctiva/sclera: Conjunctivae normal.   Neck:      Musculoskeletal: Full passive range of motion without pain, normal range of motion and neck supple. Cardiovascular:      Rate and Rhythm: Regular rhythm. Bradycardia present. Pulses: Normal pulses. Heart sounds: Normal heart sounds. Pulmonary:      Effort: Pulmonary effort is normal.      Breath sounds: Normal breath sounds. Abdominal:      General: There is no distension. There are no signs of injury. Palpations: Abdomen is soft. Tenderness: There is no abdominal tenderness. Musculoskeletal: Normal range of motion. General: No deformity or signs of injury. Skin:     General: Skin is warm. Neurological:      General: No focal deficit present. Mental Status: He is oriented to person, place, and time and easily aroused. Mental status is at baseline. Motor: Motor function is intact.       Coordination: Coordination is intact. Gait: Gait is intact. Psychiatric:         Attention and Perception: Attention and perception normal.         Mood and Affect: Mood and affect normal.         Speech: Speech normal.         Behavior: Behavior normal. Behavior is cooperative. Thought Content: Thought content normal.         Cognition and Memory: Cognition normal.         Judgment: Judgment normal.       LABS:    CBC:   Recent Labs     02/04/20 0447 02/04/20 2216 02/05/20 0444   WBC 5.4 3.5* 3.3*   HGB 9.8* 9.9* 8.5*   HCT 29.7* 30.6* 26.2*   * 123* 106*   MCV 97.2 99.2 99.0     Renal:    Recent Labs     02/04/20 0447 02/04/20 2216 02/05/20 0444   * 134* 134*   K 4.6 5.0 5.4*    103 105   CO2 21 21 19*   BUN 31* 30* 28*   CREATININE 1.1 1.0 1.1   GLUCOSE 97 118* 84   CALCIUM 8.4 8.2* 7.8*   MG  --  2.10  --    ANIONGAP 12 10 10     Hepatic:   Recent Labs     02/04/20 2216   AST 20   ALT 22   BILITOT 0.3   PROT 6.4   LABALBU 2.9*   ALKPHOS 184*     Troponin: No results for input(s): TROPONINI in the last 72 hours. BNP: No results for input(s): BNP in the last 72 hours. Lipids: No results for input(s): CHOL, HDL in the last 72 hours. Invalid input(s): LDLCALCU, TRIGLYCERIDE  ABGs:  No results for input(s): PHART, YZJ9MKG, PO2ART, PID9KFG, BEART, THGBART, K3IYZELL, PFD3RSN in the last 72 hours. INR: No results for input(s): INR in the last 72 hours. Lactate: No results for input(s): LACTATE in the last 72 hours. Cultures:  -----------------------------------------------------------------  RAD:   XR CHEST PORTABLE   Final Result      Unchanged small left pleural effusion and left basilar atelectasis versus pneumonia. XR CHEST STANDARD (2 VW)   Final Result      Left persistent pleural-based consolidation and pleural effusion. Slightly increased compared to prior study      Prior sternotomy. Right lung remains clear.           Assessment/Plan:     77M w/ PMH CAD s/p CABG, BPH, GERD, back pain, A-fib, CKD, venous stasis B/L LE wounds, and HLD presented on 02/01/20 w/ urinary retention after being unable to replace schneider.      1. UTI 2/2 chronic indwelling catheter  - Hx of multiple UTIs 2/2 chronic schneider (MDRO, carbapenem/zosyn sensitive). - UA (02/01/20): Leukocyte (+) / Nitrite (+). WBC . Pseudomonas (+). Meropenem-sensitive. - Prior cultures: (+) for Proteus penneri (11/19/19, 10/26/19), Pseudomonas (01/03/19), and Proteus mirabilis (01/03/19). - No white count. WBC 5.9 > 8.9 > 5.4 > 3.5 > 3.3.  - Plan: Meropenem 1 g IV q8h.     2. Urinary retention  - Cysto (01/2019): Likely neurogenic bladder. Small prostate.  - 1.8 L UOP last 24 hours w/ catheter in place.  - Plan: Tamsulosin 0.4 mg QD. Voiding trial per urology. Possible suprapubic catheter if voiding trial fails. Holding bethanechol d/t bradycardia.      3. Bradycardia  - HR 62 this AM.  - Plan: Hold bethanechol. Monitor on tele. 4. Hypothyroidism  - TSH elevated (5.03) w/ low T4 (0.8) this AM.  - Start synthroid 50 mcg QD.     5. ALIS  - Cr stable (1.1 > 1.0 > 1.1) but above-baseline (0.6). 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  - Plan: Continue  ml/hr. Nephrology consult. 6. Anemia  - HgB down-trending since admit (11.1 > 8.5 today). - Platelets down-trending (162 > 106 today). - Possibly dilutional, no evidence for bleeding.  - Plan: Anemia work-up w/ iron studies, FOBT. Continue Iron 325 mg QD. 7. Venous stasis wounds (chronic)  - Wounds on b/l LE wounds. - Plan: Routine wound care. Consider podiatry consult.     8. CAD s/p CABG:   - Continue  mg QD.     9. HTN  - BP controlled. 125/71 this AM.  - Imdur 30 mg QD.     10. HLD  - Atorvastatin 80 mg qPM.     11. Neuromuscular spasticity  - Associated w/ functional quadriplegia   - Intrathecal baclofen pump     12. Neuropathy  - Gabapentin 3600 mg QD.     13. GERD  - Famotidine 20 mg BID.      14. Insomnia  - Melatonin 3 mg qPM.  - Ambien 5 mg qPM.     15. PPX  - Heparin.     Code Status: Full code  FEN: Cardiac  DISPO: Floor     Glenna Oseguera DO, PGY-1  02/05/20  11:37 AM    This patient has been staffed and discussed with Flakito Bowser MD.     Patient seen and examined, plan of care discussed with residents. Agree with their assessment and plan with following addendum:  Hypothermic and hodan overnight, was treated per sepsis protocol, vitals are stable today. Overall he feels about the same. Will get nephrology to see him as creatinine not coming down with IVF and other metabolic abnormalities, HR improved off urecholine. Supplementing synthroid.        Flakito Bowser

## 2020-02-06 ENCOUNTER — APPOINTMENT (OUTPATIENT)
Dept: VASCULAR LAB | Age: 80
DRG: 698 | End: 2020-02-06
Payer: MEDICARE

## 2020-02-06 PROBLEM — A49.8 PSEUDOMONAS INFECTION: Status: ACTIVE | Noted: 2020-02-06

## 2020-02-06 LAB
ANION GAP SERPL CALCULATED.3IONS-SCNC: 8 MMOL/L (ref 3–16)
BASOPHILS ABSOLUTE: 0 K/UL (ref 0–0.2)
BASOPHILS RELATIVE PERCENT: 1 %
BLOOD CULTURE, ROUTINE: NORMAL
BUN BLDV-MCNC: 23 MG/DL (ref 7–20)
CALCIUM SERPL-MCNC: 8.2 MG/DL (ref 8.3–10.6)
CHLORIDE BLD-SCNC: 110 MMOL/L (ref 99–110)
CO2: 19 MMOL/L (ref 21–32)
CREAT SERPL-MCNC: 1 MG/DL (ref 0.8–1.3)
CULTURE, BLOOD 2: NORMAL
EOSINOPHILS ABSOLUTE: 0.3 K/UL (ref 0–0.6)
EOSINOPHILS RELATIVE PERCENT: 8.3 %
FERRITIN: 92.6 NG/ML (ref 30–400)
FOLATE: 5.86 NG/ML (ref 4.78–24.2)
GFR AFRICAN AMERICAN: >60
GFR NON-AFRICAN AMERICAN: >60
GLUCOSE BLD-MCNC: 91 MG/DL (ref 70–99)
HCT VFR BLD CALC: 27.4 % (ref 40.5–52.5)
HEMOGLOBIN: 8.9 G/DL (ref 13.5–17.5)
IRON SATURATION: 30 % (ref 20–50)
IRON: 65 UG/DL (ref 59–158)
LYMPHOCYTES ABSOLUTE: 0.9 K/UL (ref 1–5.1)
LYMPHOCYTES RELATIVE PERCENT: 26.4 %
MCH RBC QN AUTO: 32.5 PG (ref 26–34)
MCHC RBC AUTO-ENTMCNC: 32.5 G/DL (ref 31–36)
MCV RBC AUTO: 100.1 FL (ref 80–100)
MONOCYTES ABSOLUTE: 0.3 K/UL (ref 0–1.3)
MONOCYTES RELATIVE PERCENT: 9.5 %
NEUTROPHILS ABSOLUTE: 1.9 K/UL (ref 1.7–7.7)
NEUTROPHILS RELATIVE PERCENT: 54.8 %
OCCULT BLOOD DIAGNOSTIC: NORMAL
PDW BLD-RTO: 15.1 % (ref 12.4–15.4)
PHOSPHORUS: 2.8 MG/DL (ref 2.5–4.9)
PLATELET # BLD: 119 K/UL (ref 135–450)
PMV BLD AUTO: 10 FL (ref 5–10.5)
POTASSIUM REFLEX MAGNESIUM: 5.1 MMOL/L (ref 3.5–5.1)
RBC # BLD: 2.73 M/UL (ref 4.2–5.9)
SODIUM BLD-SCNC: 137 MMOL/L (ref 136–145)
TOTAL IRON BINDING CAPACITY: 216 UG/DL (ref 260–445)
VITAMIN B-12: 184 PG/ML (ref 211–911)
WBC # BLD: 3.5 K/UL (ref 4–11)

## 2020-02-06 PROCEDURE — 36415 COLL VENOUS BLD VENIPUNCTURE: CPT

## 2020-02-06 PROCEDURE — 6360000002 HC RX W HCPCS: Performed by: STUDENT IN AN ORGANIZED HEALTH CARE EDUCATION/TRAINING PROGRAM

## 2020-02-06 PROCEDURE — 82746 ASSAY OF FOLIC ACID SERUM: CPT

## 2020-02-06 PROCEDURE — G0328 FECAL BLOOD SCRN IMMUNOASSAY: HCPCS

## 2020-02-06 PROCEDURE — 2580000003 HC RX 258: Performed by: STUDENT IN AN ORGANIZED HEALTH CARE EDUCATION/TRAINING PROGRAM

## 2020-02-06 PROCEDURE — 6370000000 HC RX 637 (ALT 250 FOR IP): Performed by: STUDENT IN AN ORGANIZED HEALTH CARE EDUCATION/TRAINING PROGRAM

## 2020-02-06 PROCEDURE — 99223 1ST HOSP IP/OBS HIGH 75: CPT | Performed by: INTERNAL MEDICINE

## 2020-02-06 PROCEDURE — 80048 BASIC METABOLIC PNL TOTAL CA: CPT

## 2020-02-06 PROCEDURE — 83540 ASSAY OF IRON: CPT

## 2020-02-06 PROCEDURE — 82607 VITAMIN B-12: CPT

## 2020-02-06 PROCEDURE — 2580000003 HC RX 258: Performed by: INTERNAL MEDICINE

## 2020-02-06 PROCEDURE — 93971 EXTREMITY STUDY: CPT

## 2020-02-06 PROCEDURE — 83550 IRON BINDING TEST: CPT

## 2020-02-06 PROCEDURE — 2500000003 HC RX 250 WO HCPCS: Performed by: INTERNAL MEDICINE

## 2020-02-06 PROCEDURE — 6370000000 HC RX 637 (ALT 250 FOR IP): Performed by: INTERNAL MEDICINE

## 2020-02-06 PROCEDURE — 85025 COMPLETE CBC W/AUTO DIFF WBC: CPT

## 2020-02-06 PROCEDURE — 82728 ASSAY OF FERRITIN: CPT

## 2020-02-06 PROCEDURE — 6370000000 HC RX 637 (ALT 250 FOR IP): Performed by: UROLOGY

## 2020-02-06 PROCEDURE — 1200000000 HC SEMI PRIVATE

## 2020-02-06 PROCEDURE — 84100 ASSAY OF PHOSPHORUS: CPT

## 2020-02-06 RX ORDER — SODIUM BICARBONATE 650 MG/1
650 TABLET ORAL 2 TIMES DAILY
Status: DISCONTINUED | OUTPATIENT
Start: 2020-02-06 | End: 2020-02-13 | Stop reason: HOSPADM

## 2020-02-06 RX ORDER — SENNA PLUS 8.6 MG/1
2 TABLET ORAL DAILY
Status: DISCONTINUED | OUTPATIENT
Start: 2020-02-06 | End: 2020-02-13 | Stop reason: HOSPADM

## 2020-02-06 RX ORDER — POLYETHYLENE GLYCOL 3350 17 G/17G
17 POWDER, FOR SOLUTION ORAL DAILY
Status: DISCONTINUED | OUTPATIENT
Start: 2020-02-06 | End: 2020-02-13 | Stop reason: HOSPADM

## 2020-02-06 RX ADMIN — CASTOR OIL AND BALSAM, PERU: 788; 87 OINTMENT TOPICAL at 21:36

## 2020-02-06 RX ADMIN — SODIUM BICARBONATE 650 MG TABLET 650 MG: at 13:29

## 2020-02-06 RX ADMIN — GABAPENTIN 1800 MG: 600 TABLET, FILM COATED ORAL at 21:45

## 2020-02-06 RX ADMIN — POLYETHYLENE GLYCOL 3350 17 G: 17 POWDER, FOR SOLUTION ORAL at 15:56

## 2020-02-06 RX ADMIN — HEPARIN SODIUM 5000 UNITS: 5000 INJECTION INTRAVENOUS; SUBCUTANEOUS at 21:35

## 2020-02-06 RX ADMIN — GABAPENTIN 600 MG: 600 TABLET, FILM COATED ORAL at 08:51

## 2020-02-06 RX ADMIN — FAMOTIDINE 20 MG: 20 TABLET ORAL at 08:51

## 2020-02-06 RX ADMIN — Medication 10 ML: at 21:37

## 2020-02-06 RX ADMIN — SODIUM BICARBONATE 650 MG TABLET 650 MG: at 21:36

## 2020-02-06 RX ADMIN — ATORVASTATIN CALCIUM 80 MG: 80 TABLET, FILM COATED ORAL at 21:36

## 2020-02-06 RX ADMIN — MEROPENEM 1 G: 1 INJECTION, POWDER, FOR SOLUTION INTRAVENOUS at 06:19

## 2020-02-06 RX ADMIN — HEPARIN SODIUM 5000 UNITS: 5000 INJECTION INTRAVENOUS; SUBCUTANEOUS at 05:44

## 2020-02-06 RX ADMIN — TAMSULOSIN HYDROCHLORIDE 0.4 MG: 0.4 CAPSULE ORAL at 08:51

## 2020-02-06 RX ADMIN — SENNOSIDES 17.2 MG: 8.6 TABLET, FILM COATED ORAL at 15:56

## 2020-02-06 RX ADMIN — Medication 10 ML: at 08:50

## 2020-02-06 RX ADMIN — MEROPENEM 1 G: 1 INJECTION, POWDER, FOR SOLUTION INTRAVENOUS at 22:48

## 2020-02-06 RX ADMIN — ASPIRIN 325 MG ORAL TABLET 325 MG: 325 PILL ORAL at 21:36

## 2020-02-06 RX ADMIN — LEVOTHYROXINE SODIUM 50 MCG: 0.05 TABLET ORAL at 05:47

## 2020-02-06 RX ADMIN — FERROUS SULFATE TAB 325 MG (65 MG ELEMENTAL FE) 325 MG: 325 (65 FE) TAB at 08:51

## 2020-02-06 RX ADMIN — SODIUM BICARBONATE: 84 INJECTION, SOLUTION INTRAVENOUS at 00:04

## 2020-02-06 RX ADMIN — FAMOTIDINE 20 MG: 20 TABLET ORAL at 21:36

## 2020-02-06 RX ADMIN — MEROPENEM 1 G: 1 INJECTION, POWDER, FOR SOLUTION INTRAVENOUS at 13:29

## 2020-02-06 RX ADMIN — HEPARIN SODIUM 5000 UNITS: 5000 INJECTION INTRAVENOUS; SUBCUTANEOUS at 13:30

## 2020-02-06 RX ADMIN — CASTOR OIL AND BALSAM, PERU: 788; 87 OINTMENT TOPICAL at 09:00

## 2020-02-06 RX ADMIN — ISOSORBIDE MONONITRATE 30 MG: 30 TABLET, EXTENDED RELEASE ORAL at 08:52

## 2020-02-06 ASSESSMENT — ENCOUNTER SYMPTOMS
DIARRHEA: 0
BACK PAIN: 0
CONSTIPATION: 0
VOMITING: 0
SINUS PAIN: 0
EYE PAIN: 0
ABDOMINAL PAIN: 0
SHORTNESS OF BREATH: 0

## 2020-02-06 ASSESSMENT — PAIN SCALES - GENERAL
PAINLEVEL_OUTOF10: 0

## 2020-02-06 ASSESSMENT — VISUAL ACUITY: OU: 1

## 2020-02-06 NOTE — DISCHARGE SUMMARY
INTERNAL MEDICINE DEPARTMENT AT [address] [place] [address]  DISCHARGE SUMMARY    Patient ID: Doroteo Hanson [facility]                                             Discharge Date: 2/13/2020   Patient's PCP: Merrick Isbell                                          Discharge Physician: Mirna Garcia Date: 2/1/2020   Admitting Physician: Ede Pardo MD    DISCHARGE DIAGNOSES:  1-Complicated UTI  2-Pneumonia  3-Urinary retention  4-Encephalopathy (2/2 to baclofen withdrawal)  5-Acute kidney injury    Chronic, Stable Medical Conditions, POA:  1. Hypothyroidism  2. Bradycardia  3. Anemia  4. Lower extremity venous stasis  5. Lower extremity neuropathy  6. Hypertension  7. GERD  8. Neuromuscular spasticity  9. GERD  10. Insomnia  11. Somnolence  12. Vitamin B12 deficiency  13. Sacral pressure ulcer  14. Constipation    Hospital Course:  Kirk Cary is a 67year old male with prior history of coronary artery disease (status post-CABG), benign prostatic hyperplasia, GERD, back pain, atrial fibrillation, chronic kidney disease, venous stasis with bilateral lower extremity wounds, neuropathy and hyperlipidemia who presented to the [facility] emergency department on 02/01/20 with urinary retention after being unable to replace his chronic schneider catheter. Following admission, the schneider catheter was replaced and the patient subsequently had good urine output. His bethanechol was discontinued due to bradycardia although he was maintained on Tamsulosin 0.4 mg QD. Urinalysis testing on 02/01/20 was consistent with UTI with positive leukocyte esterase, positive nitrite, and elevated WBCs. A urine culture from the same day grew Pseudomonas that was sensitive to meropenem. The patient had initially been started on Zosyn but was switched to meropenem for complicated UTI treatment. A subsequent culture collected on 02/05/20 grew Candida and the patient was managed as directed through an Infectious disease consult.  The patient was additionally managed conservatively with gentle hydration for potential acute kidney injury during his admission, with stablization of renal labs during his admission. We dose-adjusted the patient's PM sleep medications (ambien, melatonin) due to daytime drowsiness during his admission. Lab monitoring also demonstrated some anemia during his admission, although lab hemoglobin values may have been artifically depressed by IV hydration given to treat acute kidney injury. A limited anemia work-up was performed, however, and the patient was maintained with oral iron supplementation. Laboratory work-up was also consistent with hypothyroidism and the patient was started on a low dose on synthroid (50 mcg QD). A urology consult was placed for management of urinary retention and it was felt that since the patient had good urine output with the schneider in place, he could be discharged with the schneider with plans for further work-up as an outpatient. Such work-up may include a voiding trial and potentially a suprapubic catheter if needed. A PT/OT evalation was performed to assess function in advance of discharge and our assessment was that nursing home placement would be appropriate. At this time, the patient is stable and acceptable for discharge to a nursing home facility with appropriate management and long-term care capacity. On the date of discharge, the patient reported feeling stable. The patient was found to not be in any acute distress, with vital signs within normal limits, and no new abnormalities on physical examination. Further, the patient expressed appropriate understanding of, and agreement with, the discharge recommendations, medications, and plan.     Physical Exam:  /66   Pulse 55   Temp 97.2 °F (36.2 °C) (Oral)   Resp 16   Ht 5' 11\" (1.803 m)   Wt 207 lb 3.2 oz (94 kg)   SpO2 94%   BMI 28.90 kg/m²   General appearance: alert, appears stated age and cooperative  Head: Normocephalic, without obvious abnormality, atraumatic  Eyes: conjunctivae/corneas clear. PERRL, EOM's intact. Fundi benign. Ears: normal TM's and external ear canals both ears  Nose: Nares normal. Septum midline. Mucosa normal. No drainage or sinus tenderness. Throat: lips, mucosa, and tongue normal; teeth and gums normal  Neck: no adenopathy, no carotid bruit, no JVD, supple, symmetrical, trachea midline and thyroid not enlarged, symmetric, no tenderness/mass/nodules  Lungs: clear to auscultation bilaterally  Heart: regular rate and rhythm, S1, S2 normal, no murmur, click, rub or gallop  Abdomen: soft, non-tender; bowel sounds normal; no masses,  no organomegaly  Extremities: extremities normal, atraumatic, no cyanosis or edema  Neurologic: Grossly normal    Consults: nephrology  Disposition: SNF  Discharged Condition: Stable  Follow Up: Primary Care Physician in two weeks    DISCHARGE MEDICATION:     Medication List      START taking these medications    levothyroxine 50 MCG tablet  Commonly known as:  SYNTHROID  Take 1 tablet by mouth Daily  Start taking on:  February 14, 2020     linezolid 600 MG tablet  Commonly known as:  ZYVOX  Take 1 tablet by mouth every 12 hours for 3 doses     polyethylene glycol packet  Commonly known as:  GLYCOLAX  Take 17 g by mouth daily  Start taking on:  February 14, 2020     sodium bicarbonate 650 MG tablet  Take 1 tablet by mouth 2 times daily     tamsulosin 0.4 MG capsule  Commonly known as:  FLOMAX  Take 1 capsule by mouth daily  Start taking on:  February 14, 2020        CHANGE how you take these medications    gabapentin 600 MG tablet  Commonly known as:  NEURONTIN  Take 0.5 tablets by mouth 3 times daily for 30 days. What changed:    · how much to take  · when to take this  · Another medication with the same name was removed. Continue taking this medication, and follow the directions you see here.         CONTINUE taking these medications    aspirin 325 MG tablet     atorvastatin 80 MG

## 2020-02-06 NOTE — PROGRESS NOTES
Reported concern of constipation to Dr Johnny Villar due to abdomen feels tight and no BM since 2/4/20. Orders were placed.  Electronically signed by Bertram Meneses RN on 2/6/2020 at 3:12 PM

## 2020-02-06 NOTE — CONSULTS
Infectious Diseases Inpatient Consult Note    Medical Student note - reviewed and modified, see Attending addendum at bottom    Reason for Consult:   UTI  Requesting Physician:   Floridalma Guerrero MD  Primary Care Physician:  INES TSANG  History Obtained From:   Pt, EPIC    Admit Date: 2/1/2020  Hospital Day: 10    CHIEF COMPLAINT:       Chief Complaint   Patient presents with    Other     schneider catheter came out       HISTORY OF PRESENT ILLNESS:      Pt is a 72yo M with PMHx CAD, CABG x5, AF, CKD, venous stasis changes, and BPH with chronic indwelling Schneider catheter who initially presented for catheter dislodgement and UTI. Cysto in 1/2019 showed neurogenic bladder. Per records, pt has been treated several times for UTIs in the past year with cultures as follows:   1/2019: +Proteus, Pseudomonas, Enterococcus. Trx w/ Zosyn and d/c on Ciprofloxacin  8/2019: +Mixed organisms. Trx ni ED with Ciprofloxacin  10/2019: + MDR Proteus. Trx wi/ Ceftriaxone. 11/2019: + MDR Proteus. Trx w/ Meropenem and Augmentin. Pt presented to ED on 2/1 for dislodged Schneider. After replacement, UA showed +leukocytes, + nitrites and WBC . Pt afebrile with WBC 7.1. Blood cultures were drawn, pt placed on Zosyn and admitted. In [facility], pt has had fatigue and lethargy. Initial plan was to discharge on 2/4 with PO Cipro, but pt developed hypothermia (T 94.8). hyperkalemia (5.4), and hypocalcemia (7.8) with downtrending Plt (162 > 106). Pt has remained afebrile throughout stay w/o leukocytosis. Sx have persisted and pt has been unable to perform voiding trial per Uro recs. Urine culture from 2/1 grew Pseudomonas sensitive to meropenem, so he was started on this 2/5. Repeat culture grew Candida albicans.      Past Medical History:    Past Medical History:   Diagnosis Date    BPH (benign prostatic hyperplasia)     Carotid stenosis 12/2013    JESU 03-20% stenosis; LICA 02-31% stenosis    Cellulitis 12/2013    LLE    Chronic back pain SpO2 96%   BMI 25.94 kg/m²     GENERAL: No apparent distress. HEENT: Membranes moist, no oral lesion, PERRL  NECK:  Supple, no lymphadenopaty  LUNGS: Clear b/l, no rales, no dullness  CARDIAC: RRR, no murmur appreciated  ABD:  + BS, soft / NT  EXT:  No rash, no edema, no lesions  NEURO: Awakens to voice, but will not open eyes. States \"go away\" or \"yes\" repeatedly in response to questioning. PSYCH: Lethargic  LINES:  Peripheral iv    DATA:    Lab Results   Component Value Date    WBC 3.5 (L) 2020    HGB 8.9 (L) 2020    HCT 27.4 (L) 2020    .1 (H) 2020     (L) 2020     Lab Results   Component Value Date    CREATININE 1.0 2020    BUN 23 (H) 2020     2020    K 5.1 2020     2020    CO2 19 (L) 2020       Hepatic Function Panel:   Lab Results   Component Value Date    ALKPHOS 184 2020    ALT 22 2020    AST 20 2020    PROT 6.4 2020    BILITOT 0.3 2020    BILIDIR <0.2 10/06/2019    IBILI see below 10/06/2019    LABALBU 2.9 2020       Micro:   Repeat urine culture: +Candida albicans   Blood cultures: no growth after 4 days   Urine culture: +Pseudomonas aeruginosa  Antibiotic Interpretation YANCY   cefepime Sensitive 4 mcg/mL   ciprofloxacin Sensitive <=1 mcg/mL   gentamicin Sensitive <=4 mcg/mL   meropenem Sensitive <=1 mcg/mL   piperacillin-tazobactam Sensitive <=16 mcg/mL   tobramycin Sensitive <=4 mcg/mL       Imagin/4 CXR: Unchanged small left pleural effusion and left basilar atelectasis versus pneumonia.  CXR:   - Consolidative changes identified within the posterior medial aspect the left lower lobe correspond to rounded atelectatic changes on chest CT dated 2019.  - Small left pleural effusion.  - Hiatal hernia.        IMPRESSION:      Patient Active Problem List   Diagnosis    Hypotension    Light headed    Cellulitis    Essential hypertension    GERD for recent cult results  Lives alone in community    Presented with 'dislodged schneider'  In ED 2/1, afeb, WBC 7/1, UA +. Schneider changed. Urine cult + Ps aeruginosa    Pt developed lethargy, low temp, elevated K / low Ca++  Pt is now awake, alert. No urinary complaints.   No suprapubic tenderness, no CVAT    IMP/  Chronic schneider  Admit with schneider problem  Pseudomonas bacteriuria vs UTI, favor bacteriuria    REC/  No antibiotics indicated    Discussed with ALPHONSO Gallegos MD

## 2020-02-06 NOTE — PROGRESS NOTES
Pt alert and oriented this am with no signs of issues/concerns. As the afternoon progressed pt has become more lethargic. He is easily aroused with voice however will not open eyes and often tells staff to leave him alone. Iglesias draining clear yellow urine however no BM thus far this shift. Pt is currently off the floor for testing. Linen change has been completed this am however pt refused change this afternoon and has not eaten his lunch. Will continue to monitor.  Electronically signed by Surya Green RN on 2/6/2020 at 2:59 PM

## 2020-02-06 NOTE — FLOWSHEET NOTE
02/06/20 1538   Encounter Summary   Services provided to: Patient   Referral/Consult From: Rounding   Continue Visiting   (2/6, grace )   Complexity of Encounter Low   Length of Encounter 15 minutes   Routine   Type Initial   Assessment Sleeping   Staff Christina donato [place]

## 2020-02-06 NOTE — PROGRESS NOTES
Progress Note    Admit Date: 2/1/2020  Day: 5  Diet: DIET CARDIAC; Low Potassium    CC: Urinary retention    Interval history: No overnight events. Vital signs stable this AM. Low-normal heart rate (60) and temperature (97.0) this AM. CO2 down-trending since admit (25 > 19 today). Hypocalcemia (8.2). Persisting anemia (HgB 8.9). 3.575 L urine output in last 24 hours. No leukocytosis (3.5). Cr elevated slightly (1.0 today) compared to earlier in admission (0.6 on 2/1), but does appear to be at or near patient's historical baseline (e.g., 1.0 on 01/19 when chronic schneider was placed). Urine cultures from 02/05 growing Candida. Patient interviewed this AM and was more awake/alert compared to previous days. He admits to some lower extremity numbness and there is increased leg swelling with some erythema. He denies chest pain, SOB, abdominal pain, constipation and diarrhea.     Medications:     Scheduled Meds:   Venelex   Topical BID    melatonin  3 mg Oral Nightly    zolpidem  5 mg Oral Nightly    levothyroxine  50 mcg Oral Daily    meropenem  1 g Intravenous Q8H    heparin (porcine)  5,000 Units Subcutaneous 3 times per day    [Held by provider] bethanechol  25 mg Oral TID    tamsulosin  0.4 mg Oral Daily    aspirin  325 mg Oral Nightly    ferrous sulfate  325 mg Oral Daily with breakfast    atorvastatin  80 mg Oral Nightly    gabapentin  600 mg Oral Daily    gabapentin  1,200 mg Oral Lunch    gabapentin  1,800 mg Oral Nightly    isosorbide mononitrate  30 mg Oral Daily    famotidine  20 mg Oral BID    sodium chloride flush  10 mL Intravenous 2 times per day     Continuous Infusions:   IV infusion builder 50 mL/hr at 02/06/20 0004     PRN Meds:niacin, sodium chloride flush, magnesium hydroxide, ondansetron, acetaminophen    Objective:   Vitals:   T-max:  Patient Vitals for the past 8 hrs:   BP Temp Temp src Pulse Resp SpO2   02/06/20 1118 (!) 149/69 97.2 °F (36.2 °C) Oral 60 16 96 %   02/06/20 0730 136/72 97.3 °F (36.3 °C) Axillary 53 16 96 %   02/06/20 0350 139/74 96.9 °F (36.1 °C) Oral 60 14 97 %       Intake/Output Summary (Last 24 hours) at 2/6/2020 1130  Last data filed at 2/6/2020 0352  Gross per 24 hour   Intake 1342 ml   Output 2075 ml   Net -733 ml     Review of Systems   Constitutional: Negative for fatigue and fever. HENT: Negative for ear pain and sinus pain. Eyes: Negative for pain. Respiratory: Negative for shortness of breath. Cardiovascular: Negative for chest pain. Gastrointestinal: Negative for abdominal pain, constipation, diarrhea and vomiting. Genitourinary: Negative for flank pain. Musculoskeletal: Negative for back pain and neck pain. Neurological: Positive for numbness. Negative for headaches. Psychiatric/Behavioral: Negative for agitation, behavioral problems and confusion. Physical Exam  Constitutional:       General: He is awake. He is not in acute distress. Appearance: Normal appearance. HENT:      Head: Normocephalic and atraumatic. Nose: Nose normal.   Eyes:      General: Vision grossly intact. Gaze aligned appropriately. Right eye: No discharge. Left eye: No discharge. Extraocular Movements: Extraocular movements intact. Conjunctiva/sclera: Conjunctivae normal.   Neck:      Musculoskeletal: Full passive range of motion without pain, normal range of motion and neck supple. Cardiovascular:      Rate and Rhythm: Normal rate and regular rhythm. Pulses: Normal pulses. Heart sounds: Normal heart sounds. Pulmonary:      Effort: Pulmonary effort is normal.      Breath sounds: Normal breath sounds. Abdominal:      General: There is no distension. There are no signs of injury. Palpations: Abdomen is soft. Tenderness: There is no abdominal tenderness. Musculoskeletal: Normal range of motion. General: No deformity or signs of injury. Skin:     General: Skin is warm.    Neurological: General: No focal deficit present. Mental Status: He is alert, oriented to person, place, and time and easily aroused. Mental status is at baseline. Motor: Motor function is intact. Coordination: Coordination is intact. Gait: Gait is intact. Psychiatric:         Attention and Perception: Attention and perception normal.         Mood and Affect: Mood and affect normal.         Speech: Speech normal.         Behavior: Behavior normal. Behavior is cooperative. Thought Content: Thought content normal.         Cognition and Memory: Cognition normal.         Judgment: Judgment normal.       LABS:    CBC:   Recent Labs     02/04/20 2216 02/05/20 0444 02/06/20  0517   WBC 3.5* 3.3* 3.5*   HGB 9.9* 8.5* 8.9*   HCT 30.6* 26.2* 27.4*   * 106* 119*   MCV 99.2 99.0 100.1*     Renal:    Recent Labs     02/04/20 2216 02/05/20 0444 02/06/20  0517   * 134* 137   K 5.0 5.4* 5.1    105 110   CO2 21 19* 19*   BUN 30* 28* 23*   CREATININE 1.0 1.1 1.0   GLUCOSE 118* 84 91   CALCIUM 8.2* 7.8* 8.2*   MG 2.10  --   --    ANIONGAP 10 10 8     Hepatic:   Recent Labs     02/04/20 2216   AST 20   ALT 22   BILITOT 0.3   PROT 6.4   LABALBU 2.9*   ALKPHOS 184*     Troponin: No results for input(s): TROPONINI in the last 72 hours. BNP: No results for input(s): BNP in the last 72 hours. Lipids: No results for input(s): CHOL, HDL in the last 72 hours. Invalid input(s): LDLCALCU, TRIGLYCERIDE  ABGs:  No results for input(s): PHART, IKU7WVW, PO2ART, LZR8XEX, BEART, THGBART, K7WRKTTE, CDV2EYD in the last 72 hours. INR: No results for input(s): INR in the last 72 hours. Lactate: No results for input(s): LACTATE in the last 72 hours. Cultures:  -----------------------------------------------------------------  RAD:   XR CHEST PORTABLE   Final Result      Unchanged small left pleural effusion and left basilar atelectasis versus pneumonia.          XR CHEST STANDARD (2 VW)   Final Result consult.     8. CAD s/p CABG:   - Continue  mg QD.     9. HTN  - BP controlled. 139/74 this AM.  - Imdur 30 mg QD.     10. HLD  - Atorvastatin 80 mg qPM.     11. Neuromuscular spasticity  - Associated w/ functional quadriplegia   - Intrathecal baclofen pump (abdomen).     12. Neuropathy  - History of B12 deficiency per patient. - Low-normal B12 lab 212 (05/02/15). - Plan: Continue gabapentin 3600 mg QD. Adding other meds may help lower gabapentin dose (e.g., Lyrica, duloxetine).     13. GERD  - Famotidine 20 mg BID.      14. Insomnia  - Melatonin 3 mg qPM.  - Ambien 5 mg qPM.     15. PPX  - Heparin. 16. Discharge planning  - Ok to discharge w/ schneider catheter per urology. - Likely DC to NH (PT-score 10/24). Code Status: Full code  FEN: Cardiac  DISPO: Floor    [facility] DO Negrita, PGY-1  02/06/20  11:30 AM    This patient has been staffed and discussed with Davina Rainey MD.     Patient seen and examined, plan of care discussed with residents. Agree with their assessment and plan with following addendum:  Consulted ID for discharge antibiotics recommendations. SW will touch base with patient and family regarding dispo- he is requiring more assistance then his baseline. Off IVF on oral bicarb. Creatinine improved/stable. No issues with bradycardia. RUE with swelling- will get US.        Davina Rainey

## 2020-02-06 NOTE — CARE COORDINATION
CM following, sent clinical to [facility] for precert for [address]. HENS completed #[id number]. Pt refused VT today, cont abx and flomax, pt can DC with schneider and follow up out pt if needed.  hgb 8.9 today. Daughter will see pt today to see if she feels he is at his baseline at home with lift chair and wheelchair status at home or if he kit need SNF like PT OT evals recommend as pt cont to refuse.   Electronically signed by Harrison Robledo RN on 2/6/2020 at 3:41 PM  [phone]

## 2020-02-06 NOTE — PROGRESS NOTES
Patient alert and oriented. VSS Patient denies any pain. PO meds taken without any difficulties. Iglesias in place draining yellow urine. .. Patient Q@ turn & reposition Patient on speciality bed. Patient in bed lowest position call light and bedside table within reach. All needs are met at this time. Patient aware to call if any help needed. Will continue to monitor  /74   Pulse 60   Temp 96.9 °F (36.1 °C) (Oral)   Resp 14   Ht 5' 11\" (1.803 m)   Wt 186 lb (84.4 kg)   SpO2 97%   BMI 25.94 kg/m²

## 2020-02-06 NOTE — PROGRESS NOTES
Urology Attending Progress Note      Subjective: weak. Does not want voiding trial today     Vitals:  /72   Pulse 53   Temp 97.3 °F (36.3 °C) (Axillary)   Resp 16   Ht 5' 11\" (1.803 m)   Wt 186 lb (84.4 kg)   SpO2 96%   BMI 25.94 kg/m²   Temp  Av.3 °F (36.3 °C)  Min: 96.9 °F (36.1 °C)  Max: 98 °F (36.7 °C)    Intake/Output Summary (Last 24 hours) at 2020 1012  Last data filed at 2020 0352  Gross per 24 hour   Intake 1342 ml   Output 2075 ml   Net -733 ml       Exam: abd soft. Schneider draining clear urine. Labs:  WBC:    Lab Results   Component Value Date    WBC 3.5 2020     Hemoglobin/Hematocrit:    Lab Results   Component Value Date    HGB 8.9 2020    HCT 27.4 2020     BMP:    Lab Results   Component Value Date     2020    K 5.1 2020     2020    CO2 19 2020    BUN 23 2020    LABALBU 2.9 2020    CREATININE 1.0 2020    CALCIUM 8.2 2020    GFRAA >60 2020    LABGLOM >60 2020     PT/INR:    Lab Results   Component Value Date    PROTIME 12.0 2019    INR 1.03 2019     PTT:    Lab Results   Component Value Date    APTT 32.0 2019   [APTT      Urine Culture:  Candida albicans     Blood Culture:  NGTD    Antibiotic Therapy:  Merrem     Imaging: no new       Impression/Plan:  81 yo M with chronic schneider 2/2 possible NGB. Cysto showed a small prostate      -bradycardic slowly improving   -labs WNL  -continue abx  -continue flomax  -will set up for voiding trial as outpatient once stronger. He would like to hold off for now. -okay to discharge with schneider.  He will f/u with Dr Baldev Hayes as outpatient   -will sign off     TIM Lopez - CNP

## 2020-02-06 NOTE — PROGRESS NOTES
Patient alert and oriented. VSS Patient denies any pain. Iglesias in place draining yellow urine. Assessment done. see flowsheet. Patient in bed lowest position call light and bedside table within reach. All needs are met at this time. Patient aware to call if any help needed. Will continue to monitor  /75   Pulse 69   Temp 97.1 °F (36.2 °C) (Oral)   Resp 18   Ht 5' 11\" (1.803 m)   Wt 186 lb (84.4 kg)   SpO2 96%   BMI 25.94 kg/m²

## 2020-02-06 NOTE — PLAN OF CARE
PT urine output adequate, yellow in color with no odor noted. Iglesias catheter working well at this time.

## 2020-02-07 ENCOUNTER — APPOINTMENT (OUTPATIENT)
Dept: GENERAL RADIOLOGY | Age: 80
DRG: 698 | End: 2020-02-07
Payer: MEDICARE

## 2020-02-07 ENCOUNTER — APPOINTMENT (OUTPATIENT)
Dept: CT IMAGING | Age: 80
DRG: 698 | End: 2020-02-07
Payer: MEDICARE

## 2020-02-07 LAB
AMORPHOUS: ABNORMAL /HPF
ANION GAP SERPL CALCULATED.3IONS-SCNC: 10 MMOL/L (ref 3–16)
BASE EXCESS ARTERIAL: -1 MMOL/L (ref -3–3)
BASOPHILS ABSOLUTE: 0 K/UL (ref 0–0.2)
BASOPHILS RELATIVE PERCENT: 0.1 %
BILIRUBIN URINE: NEGATIVE
BLOOD, URINE: NEGATIVE
BUN BLDV-MCNC: 24 MG/DL (ref 7–20)
CALCIUM SERPL-MCNC: 8.7 MG/DL (ref 8.3–10.6)
CARBOXYHEMOGLOBIN ARTERIAL: 2.4 % (ref 0–1.5)
CHLORIDE BLD-SCNC: 107 MMOL/L (ref 99–110)
CLARITY: CLEAR
CO2: 22 MMOL/L (ref 21–32)
COLOR: YELLOW
CREAT SERPL-MCNC: 0.8 MG/DL (ref 0.8–1.3)
EOSINOPHILS ABSOLUTE: 0.1 K/UL (ref 0–0.6)
EOSINOPHILS RELATIVE PERCENT: 0.7 %
GFR AFRICAN AMERICAN: >60
GFR NON-AFRICAN AMERICAN: >60
GLUCOSE BLD-MCNC: 111 MG/DL (ref 70–99)
GLUCOSE URINE: NEGATIVE MG/DL
HCO3 ARTERIAL: 24 MMOL/L (ref 21–29)
HCT VFR BLD CALC: 29 % (ref 40.5–52.5)
HEMOGLOBIN, ART, EXTENDED: 9.5 G/DL
HEMOGLOBIN: 9.5 G/DL (ref 13.5–17.5)
KETONES, URINE: NEGATIVE MG/DL
LEUKOCYTE ESTERASE, URINE: ABNORMAL
LYMPHOCYTES ABSOLUTE: 0.4 K/UL (ref 1–5.1)
LYMPHOCYTES RELATIVE PERCENT: 3 %
MCH RBC QN AUTO: 32.2 PG (ref 26–34)
MCHC RBC AUTO-ENTMCNC: 32.6 G/DL (ref 31–36)
MCV RBC AUTO: 98.8 FL (ref 80–100)
METHEMOGLOBIN ARTERIAL: 0.5 % (ref 0–1.4)
MICROSCOPIC EXAMINATION: YES
MONOCYTES ABSOLUTE: 0.6 K/UL (ref 0–1.3)
MONOCYTES RELATIVE PERCENT: 5.5 %
MUCUS: ABNORMAL /LPF
NEUTROPHILS ABSOLUTE: 10.6 K/UL (ref 1.7–7.7)
NEUTROPHILS RELATIVE PERCENT: 90.7 %
NITRITE, URINE: NEGATIVE
O2 SAT, ARTERIAL: 98 % (ref 93–100)
PCO2 ARTERIAL: 44.4 MMHG (ref 35–45)
PDW BLD-RTO: 15.3 % (ref 12.4–15.4)
PH ARTERIAL: 7.35 (ref 7.35–7.45)
PH UA: 6 (ref 5–8)
PLATELET # BLD: 145 K/UL (ref 135–450)
PMV BLD AUTO: 9.3 FL (ref 5–10.5)
PO2 ARTERIAL: 95.3 MMHG (ref 75–108)
POTASSIUM REFLEX MAGNESIUM: 5.4 MMOL/L (ref 3.5–5.1)
PROCALCITONIN: 0.82 NG/ML (ref 0–0.15)
PROTEIN UA: NEGATIVE MG/DL
RBC # BLD: 2.94 M/UL (ref 4.2–5.9)
RBC UA: ABNORMAL /HPF (ref 0–2)
SODIUM BLD-SCNC: 139 MMOL/L (ref 136–145)
SPECIFIC GRAVITY UA: >=1.03 (ref 1–1.03)
TCO2 ARTERIAL: 25 MMOL/L
URINE REFLEX TO CULTURE: YES
URINE TYPE: ABNORMAL
UROBILINOGEN, URINE: 0.2 E.U./DL
WBC # BLD: 11.7 K/UL (ref 4–11)
WBC UA: ABNORMAL /HPF (ref 0–5)
YEAST: PRESENT /HPF

## 2020-02-07 PROCEDURE — 2580000003 HC RX 258: Performed by: STUDENT IN AN ORGANIZED HEALTH CARE EDUCATION/TRAINING PROGRAM

## 2020-02-07 PROCEDURE — 87641 MR-STAPH DNA AMP PROBE: CPT

## 2020-02-07 PROCEDURE — 6370000000 HC RX 637 (ALT 250 FOR IP): Performed by: STUDENT IN AN ORGANIZED HEALTH CARE EDUCATION/TRAINING PROGRAM

## 2020-02-07 PROCEDURE — 6360000002 HC RX W HCPCS: Performed by: INTERNAL MEDICINE

## 2020-02-07 PROCEDURE — 84145 PROCALCITONIN (PCT): CPT

## 2020-02-07 PROCEDURE — 6360000002 HC RX W HCPCS: Performed by: STUDENT IN AN ORGANIZED HEALTH CARE EDUCATION/TRAINING PROGRAM

## 2020-02-07 PROCEDURE — 82803 BLOOD GASES ANY COMBINATION: CPT

## 2020-02-07 PROCEDURE — 1200000000 HC SEMI PRIVATE

## 2020-02-07 PROCEDURE — 6370000000 HC RX 637 (ALT 250 FOR IP): Performed by: INTERNAL MEDICINE

## 2020-02-07 PROCEDURE — 99233 SBSQ HOSP IP/OBS HIGH 50: CPT | Performed by: INTERNAL MEDICINE

## 2020-02-07 PROCEDURE — 80048 BASIC METABOLIC PNL TOTAL CA: CPT

## 2020-02-07 PROCEDURE — 86340 INTRINSIC FACTOR ANTIBODY: CPT

## 2020-02-07 PROCEDURE — 2700000000 HC OXYGEN THERAPY PER DAY

## 2020-02-07 PROCEDURE — 85025 COMPLETE CBC W/AUTO DIFF WBC: CPT

## 2020-02-07 PROCEDURE — 71045 X-RAY EXAM CHEST 1 VIEW: CPT

## 2020-02-07 PROCEDURE — 36600 WITHDRAWAL OF ARTERIAL BLOOD: CPT

## 2020-02-07 PROCEDURE — 94761 N-INVAS EAR/PLS OXIMETRY MLT: CPT

## 2020-02-07 PROCEDURE — 87086 URINE CULTURE/COLONY COUNT: CPT

## 2020-02-07 PROCEDURE — 81001 URINALYSIS AUTO W/SCOPE: CPT

## 2020-02-07 PROCEDURE — 36415 COLL VENOUS BLD VENIPUNCTURE: CPT

## 2020-02-07 PROCEDURE — 87040 BLOOD CULTURE FOR BACTERIA: CPT

## 2020-02-07 PROCEDURE — 70450 CT HEAD/BRAIN W/O DYE: CPT

## 2020-02-07 RX ORDER — CYANOCOBALAMIN 1000 UG/ML
1000 INJECTION INTRAMUSCULAR; SUBCUTANEOUS ONCE
Status: COMPLETED | OUTPATIENT
Start: 2020-02-07 | End: 2020-02-07

## 2020-02-07 RX ORDER — ZOLPIDEM TARTRATE 5 MG/1
5 TABLET ORAL NIGHTLY PRN
Status: DISCONTINUED | OUTPATIENT
Start: 2020-02-07 | End: 2020-02-10

## 2020-02-07 RX ORDER — IPRATROPIUM BROMIDE AND ALBUTEROL SULFATE 2.5; .5 MG/3ML; MG/3ML
1 SOLUTION RESPIRATORY (INHALATION) ONCE
Status: COMPLETED | OUTPATIENT
Start: 2020-02-07 | End: 2020-02-08

## 2020-02-07 RX ORDER — UREA 10 %
3 LOTION (ML) TOPICAL NIGHTLY PRN
Status: DISCONTINUED | OUTPATIENT
Start: 2020-02-07 | End: 2020-02-10

## 2020-02-07 RX ORDER — LINEZOLID 2 MG/ML
600 INJECTION, SOLUTION INTRAVENOUS EVERY 12 HOURS
Status: DISCONTINUED | OUTPATIENT
Start: 2020-02-07 | End: 2020-02-12

## 2020-02-07 RX ORDER — CYANOCOBALAMIN 1000 UG/ML
1000 INJECTION INTRAMUSCULAR; SUBCUTANEOUS DAILY
Status: COMPLETED | OUTPATIENT
Start: 2020-02-08 | End: 2020-02-13

## 2020-02-07 RX ORDER — GABAPENTIN 600 MG/1
300 TABLET ORAL 3 TIMES DAILY
Status: DISCONTINUED | OUTPATIENT
Start: 2020-02-07 | End: 2020-02-13 | Stop reason: HOSPADM

## 2020-02-07 RX ORDER — FUROSEMIDE 10 MG/ML
20 INJECTION INTRAMUSCULAR; INTRAVENOUS ONCE
Status: COMPLETED | OUTPATIENT
Start: 2020-02-07 | End: 2020-02-07

## 2020-02-07 RX ADMIN — Medication 10 ML: at 10:23

## 2020-02-07 RX ADMIN — MEROPENEM 1 G: 1 INJECTION, POWDER, FOR SOLUTION INTRAVENOUS at 23:21

## 2020-02-07 RX ADMIN — HEPARIN SODIUM 5000 UNITS: 5000 INJECTION INTRAVENOUS; SUBCUTANEOUS at 06:22

## 2020-02-07 RX ADMIN — MEROPENEM 1 G: 1 INJECTION, POWDER, FOR SOLUTION INTRAVENOUS at 06:10

## 2020-02-07 RX ADMIN — Medication 10 ML: at 23:24

## 2020-02-07 RX ADMIN — CASTOR OIL AND BALSAM, PERU: 788; 87 OINTMENT TOPICAL at 21:24

## 2020-02-07 RX ADMIN — FAMOTIDINE 20 MG: 20 TABLET ORAL at 21:26

## 2020-02-07 RX ADMIN — FUROSEMIDE 20 MG: 10 INJECTION, SOLUTION INTRAMUSCULAR; INTRAVENOUS at 14:11

## 2020-02-07 RX ADMIN — LINEZOLID 600 MG: 600 INJECTION, SOLUTION INTRAVENOUS at 17:41

## 2020-02-07 RX ADMIN — CASTOR OIL AND BALSAM, PERU: 788; 87 OINTMENT TOPICAL at 10:24

## 2020-02-07 RX ADMIN — HEPARIN SODIUM 5000 UNITS: 5000 INJECTION INTRAVENOUS; SUBCUTANEOUS at 21:27

## 2020-02-07 RX ADMIN — GABAPENTIN 300 MG: 600 TABLET, FILM COATED ORAL at 21:24

## 2020-02-07 RX ADMIN — ASPIRIN 325 MG ORAL TABLET 325 MG: 325 PILL ORAL at 21:26

## 2020-02-07 RX ADMIN — CYANOCOBALAMIN 1000 MCG: 1000 INJECTION, SOLUTION INTRAMUSCULAR; SUBCUTANEOUS at 12:14

## 2020-02-07 RX ADMIN — SODIUM BICARBONATE 650 MG TABLET 650 MG: at 21:26

## 2020-02-07 RX ADMIN — ATORVASTATIN CALCIUM 80 MG: 80 TABLET, FILM COATED ORAL at 21:25

## 2020-02-07 RX ADMIN — ACETAMINOPHEN 650 MG: 325 TABLET ORAL at 22:04

## 2020-02-07 RX ADMIN — MEROPENEM 1 G: 1 INJECTION, POWDER, FOR SOLUTION INTRAVENOUS at 14:11

## 2020-02-07 ASSESSMENT — PAIN SCALES - GENERAL
PAINLEVEL_OUTOF10: 6
PAINLEVEL_OUTOF10: 2

## 2020-02-07 ASSESSMENT — PAIN DESCRIPTION - FREQUENCY: FREQUENCY: INTERMITTENT

## 2020-02-07 ASSESSMENT — VISUAL ACUITY: OU: 1

## 2020-02-07 ASSESSMENT — PAIN DESCRIPTION - PAIN TYPE: TYPE: ACUTE PAIN

## 2020-02-07 ASSESSMENT — PAIN - FUNCTIONAL ASSESSMENT: PAIN_FUNCTIONAL_ASSESSMENT: ACTIVITIES ARE NOT PREVENTED

## 2020-02-07 ASSESSMENT — PAIN DESCRIPTION - ONSET: ONSET: GRADUAL

## 2020-02-07 ASSESSMENT — PAIN DESCRIPTION - PROGRESSION: CLINICAL_PROGRESSION: NOT CHANGED

## 2020-02-07 ASSESSMENT — PAIN DESCRIPTION - LOCATION: LOCATION: ARM

## 2020-02-07 ASSESSMENT — PAIN DESCRIPTION - DESCRIPTORS: DESCRIPTORS: ACHING

## 2020-02-07 ASSESSMENT — PAIN DESCRIPTION - ORIENTATION: ORIENTATION: LEFT

## 2020-02-07 NOTE — PROGRESS NOTES
Patient lethargic, responds to painful stimuli only. Vitals stable, other than O2 sats 91 on room air. Residents in room to evaluate. Tests ordered. UA sent to lab. Bathed and performed schneider care. Called respiratory therapist to get blood gases. Cleaned and redressed skin tears on right arm and right lower leg. Patient turned and repositioned, patient on alternating pressure low air loss bed. Discussed with resident, unable to give oral medications. Will continue to monitor.

## 2020-02-07 NOTE — PROGRESS NOTES
NUTRITION ASSESSMENT  Admission Date: 2/1/2020     Type and Reason for Visit: Initial(LOS eval )    NUTRITION RECOMMENDATIONS:   Encourage PO intake on Cardiac, Low Potassium diet when pt is appropriate for oral diet     NUTRITION ASSESSMENT:   Pt is at nutritional compromise 2/2 change in mental status and unable to tolerate oral diet today. Pt admitted w/ complicated UTI and prior to today, PO intake when documented was adequate % of meals on Cardiac, Low Potassium diet. Monitor clinical status and ability for pt to tolerance oral intake as appropriate. MALNUTRITION ASSESSMENT  Context: Acute illness or injury   Malnutrition Status:  At risk for malnutrition  Findings of the 6 clinical characteristics of malnutrition (Minimum of 2 out of 6 clinical characteristics is required to make the diagnosis of moderate or severe Protein Calorie Malnutrition based on AND/ASPEN Guidelines):  Energy Intake %: Less than or equal to 50% of estimated energy requirement  Energy Intake Time: (x1 day)  Interpretation of Weight Loss %: No significant weight loss  Interpretation of Weight Loss Time: in 3 months(per EMR review)  Body Fat Status: Unable to assess  Body Fat Loss Location: (not appropriate)  Muscle Mass Status: Unable to assess     Fluid Accumulation Status: Unable to assess     Reduced  Strength: Not measured    NUTRITION DIAGNOSIS   Problem: Inadequate Oral Intake  Etiology: Cognitive or neurological impairment  Signs & Symptoms: Intake 0-25%    NUTRITION INTERVENTION  Food and/or Nutrient Delivery:Continue Current diet   Nutrition education/counseling/coordination of care: Continue Inpatient Monitoring     NUTRITION MONITORING & EVALUATION:  Evaluation:Goals set   Goals: Pt will tolerate diet and consume >75% of meals offered  Monitoring: Meal Intake  or Mental Status/Confusion      OBJECTIVE DATA:  · Nutrition-Focused Physical Findings: lethargic, non responsive; +BM; BLe +2 nonpitting edema   · Wounds History  Pre-Admission / Home Diet:  Pre-Admission/Home Diet: General   Home Supplements / Herbals:    none noted  Food Restrictions / Cultural Requests:    none noted    Diet Orders / Intake / Nutrition Support  Current diet/supplement order: DIET CARDIAC; Low Potassium     NSG Recorded PO:   PO Fluids P.O.: 0 mL  PO Meals PO Meals Eaten (%): 0   PO Intake: 0%   PO Supplement: None   PO Supplement Intake: n/a      NUTRITION RISK LEVEL: Risk Level:  Moderate    Darell Smart RD, AYLA  Kevin:  [phone]  Office:  [phone]

## 2020-02-07 NOTE — PROGRESS NOTES
Nephrology Consult Note                                                                                                                                                                                                                                                                                                                              Patient's Name: Kelly MALLORY elevated   Bicarb better   Off V bicarb gtt   Good UO   Iglesias in place     Past Medical History:   Diagnosis Date    BPH (benign prostatic hyperplasia)     Carotid stenosis 12/2013    JESU 84-95% stenosis; LICA 62-14% stenosis    Cellulitis 12/2013    LLE    Chronic back pain     Diverticular disease     Erectile dysfunction     GERD (gastroesophageal reflux disease)     History of atrial fibrillation     Hypercholesteremia     Hypertension     Lower GI bleed     MDRO (multiple drug resistant organisms) resistance 10/26/2019    urine    Neuromuscular disorder (HCC)     spasticity    Renal insufficiency     Risk for falls     uses walker    Tinea corporis 12/2013    LLE    Vitamin B12 deficiency        Past Surgical History:   Procedure Laterality Date    BACK SURGERY  2006    lower lumbar    CORONARY ARTERY BYPASS GRAFT  12/13/2013    CABG x 5 (Dr Katty Marin)    CYSTOSCOPY N/A 1/10/2019    CYSTOSCOPY performed by Edna Elder MD at [facility] 169 Right 5/1/2015    [facility]       Family History   Problem Relation Age of Onset    Heart Disease Father         reports that he quit smoking about 50 years ago. He has never used smokeless tobacco. He reports that he does not drink alcohol or use drugs.     Allergies:  Bactrim [sulfamethoxazole-trimethoprim]    Current Medications:    gabapentin (NEURONTIN) tablet 300 mg, TID  sodium bicarbonate tablet 650 mg, BID  polyethylene glycol (GLYCOLAX) packet 17 g, Daily  senna (SENOKOT) tablet 17.2 mg, Daily  Venelex ointment, BID  melatonin tablet 3 mg, Nightly  zolpidem (AMBIEN) tablet 5 mg, Nightly  levothyroxine (SYNTHROID) tablet 50 mcg, Daily  meropenem (MERREM) 1 g in sodium chloride 0.9 % 100 mL IVPB (mini-bag), Q8H  heparin (porcine) injection 5,000 Units, 3 times per day  [Held by provider] bethanechol (URECHOLINE) tablet 25 mg, TID  tamsulosin (FLOMAX) capsule 0.4 mg, Daily  aspirin tablet 325 mg, Nightly  ferrous sulfate tablet 325 mg, Daily with breakfast  atorvastatin (LIPITOR) tablet 80 mg, Nightly  isosorbide mononitrate (IMDUR) extended release tablet 30 mg, Daily  niacin (NIASPAN) extended release tablet 500 mg, Nightly PRN  famotidine (PEPCID) tablet 20 mg, BID  sodium chloride flush 0.9 % injection 10 mL, 2 times per day  sodium chloride flush 0.9 % injection 10 mL, PRN  magnesium hydroxide (MILK OF MAGNESIA) 400 MG/5ML suspension 30 mL, Daily PRN  ondansetron (ZOFRAN) injection 4 mg, Q6H PRN  acetaminophen (TYLENOL) tablet 650 mg, Q4H PRN        Review of Systems:   14 point ROS obtained but were negative except mentioned in HPI      Physical exam:     Vitals:  BP (!) 170/76   Pulse 71   Temp 97.7 °F (36.5 °C) (Axillary)   Resp 16   Ht 5' 11\" (1.803 m)   Wt 186 lb (84.4 kg)   SpO2 97%   BMI 25.94 kg/m²   Constitutional:  OAA X2 (self, place), NAD  Skin: noted bilat leg chronic skin venous changes, multiple eczematous spots  Neck: no jvd noted, limited ROM  Cardiovascular:  S1, S2 no r/g, 2/6 holosystolic murmur in LUSB  Respiratory: CTA B without w/r/r  Abdomen:  +bs, soft, nt, nd  Ext: chornic lower extremity edema up to knees  Psychiatric: mood and affect appropriate, occasional tangent speech. Musculoskeletal:  Limited spastic ROM, reduced muscle bulk.     Data:   Labs:  CBC:   Recent Labs     02/05/20  0444 02/06/20  0517 02/07/20  0540   WBC 3.3* 3.5* 11.7*   HGB 8.5* 8.9* 9.5*   * 119* 145     BMP:    Recent Labs     02/05/20  0444 02/06/20  0517 02/07/20  0539   * 137 139   K 5.4* 5.1 5.4*    110 107   CO2 19* 19* 22   BUN 28* 23* 24*   CREATININE 1.1 1.0 0.8   GLUCOSE 84 91 111*     Ca/Mg/Phos:   Recent Labs     02/04/20  2216 02/05/20  0444 02/06/20  0517 02/07/20  0539   CALCIUM 8.2* 7.8* 8.2* 8.7   MG 2.10  --   --   --    PHOS  --   --  2.8  --    Adjusted Ca+2 today to 8.7 g/dl    Hepatic:   Recent Labs     02/04/20  2216   AST 20   ALT 22   BILITOT 0.3   ALKPHOS 184*     Troponin: No results for input(s): TROPONINI in the last 72 hours. BNP: No results for input(s): BNP in the last 72 hours. Lipids: No results for input(s): CHOL, TRIG, HDL, LDLCALC, LABVLDL in the last 72 hours. ABGs:   Recent Labs     02/07/20  0945   PHART 7.352   PO2ART 95.3   ZUH1LCH 44.4     INR: No results for input(s): INR in the last 72 hours. UA:  Recent Labs     02/07/20  0930   COLORU Yellow   CLARITYU Clear   GLUCOSEU Negative   BILIRUBINUR Negative   KETUA Negative   SPECGRAV >=1.030   BLOODU Negative   PHUR 6.0   PROTEINU Negative   UROBILINOGEN 0.2   NITRU Negative   LEUKOCYTESUR TRACE*   LABMICR YES   URINETYPE NotGiven      Urine Microscopic:   Recent Labs     02/07/20  0930   WBCUA 6-10*   RBCUA 3-5*     Urine Culture:   Recent Labs     02/05/20  0140   LABURIN >100,000 CFU/ml  No further workup       Urine Chemistry:   No results for input(s): Madolyn Martins, PROTEINUR, NAUR in the last 72 hours. IMAGING:  CT HEAD WO CONTRAST   Final Result      No evidence of acute intracranial abnormality. XR CHEST PORTABLE   Final Result      Perihilar consolidation bilaterally compatible with atypical pneumonia or pulmonary edema. Cardiomegaly status post sternotomy. Silhouetting the left hemidiaphragm may be due to left lower lobe consolidation or portable technique. PA and lateral chest x-ray would be helpful for further evaluation. VL Extremity Venous Right         XR CHEST PORTABLE   Final Result      Unchanged small left pleural effusion and left basilar atelectasis versus pneumonia.

## 2020-02-07 NOTE — PROGRESS NOTES
Nephrology Consult Note                                                                                                                                                                                                                                                                                                                              Patient's Name: Margie MALLORY better   On IV bicarb gtt   Good UO   Iglesias in place     Past Medical History:   Diagnosis Date    BPH (benign prostatic hyperplasia)     Carotid stenosis 12/2013    JESU 42-07% stenosis; LICA 34-16% stenosis    Cellulitis 12/2013    LLE    Chronic back pain     Diverticular disease     Erectile dysfunction     GERD (gastroesophageal reflux disease)     History of atrial fibrillation     Hypercholesteremia     Hypertension     Lower GI bleed     MDRO (multiple drug resistant organisms) resistance 10/26/2019    urine    Neuromuscular disorder (HCC)     spasticity    Renal insufficiency     Risk for falls     uses walker    Tinea corporis 12/2013    LLE    Vitamin B12 deficiency        Past Surgical History:   Procedure Laterality Date    BACK SURGERY  2006    lower lumbar    CORONARY ARTERY BYPASS GRAFT  12/13/2013    CABG x 5 (Dr Erica Villasenor)    CYSTOSCOPY N/A 1/10/2019    CYSTOSCOPY performed by Milo Thomas MD at [facility] 1690 Right 5/1/2015    [facility]       Family History   Problem Relation Age of Onset    Heart Disease Father         reports that he quit smoking about 50 years ago. He has never used smokeless tobacco. He reports that he does not drink alcohol or use drugs.     Allergies:  Bactrim [sulfamethoxazole-trimethoprim]    Current Medications:    sodium bicarbonate tablet 650 mg, BID  polyethylene glycol (GLYCOLAX) packet 17 g, Daily  senna (SENOKOT) tablet 17.2 mg, Daily  Venelex ointment, BID  melatonin tablet 3 mg, Nightly  zolpidem (AMBIEN) tablet 5 mg, Nightly  levothyroxine (SYNTHROID) tablet 50 mcg, Daily  meropenem (MERREM) 1 g in sodium chloride 0.9 % 100 mL IVPB (mini-bag), Q8H  heparin (porcine) injection 5,000 Units, 3 times per day  [Held by provider] bethanechol (URECHOLINE) tablet 25 mg, TID  tamsulosin (FLOMAX) capsule 0.4 mg, Daily  aspirin tablet 325 mg, Nightly  ferrous sulfate tablet 325 mg, Daily with breakfast  atorvastatin (LIPITOR) tablet 80 mg, Nightly  gabapentin (NEURONTIN) tablet 600 mg, Daily  gabapentin (NEURONTIN) tablet 1,200 mg, Lunch  gabapentin (NEURONTIN) tablet 1,800 mg, Nightly  isosorbide mononitrate (IMDUR) extended release tablet 30 mg, Daily  niacin (NIASPAN) extended release tablet 500 mg, Nightly PRN  famotidine (PEPCID) tablet 20 mg, BID  sodium chloride flush 0.9 % injection 10 mL, 2 times per day  sodium chloride flush 0.9 % injection 10 mL, PRN  magnesium hydroxide (MILK OF MAGNESIA) 400 MG/5ML suspension 30 mL, Daily PRN  ondansetron (ZOFRAN) injection 4 mg, Q6H PRN  acetaminophen (TYLENOL) tablet 650 mg, Q4H PRN        Review of Systems:   14 point ROS obtained but were negative except mentioned in HPI      Physical exam:     Vitals:  BP (!) 159/75   Pulse 84   Temp 97.1 °F (36.2 °C) (Axillary)   Resp 18   Ht 5' 11\" (1.803 m)   Wt 186 lb (84.4 kg)   SpO2 91%   BMI 25.94 kg/m²   Constitutional:  OAA X2 (self, place), NAD  Skin: noted bilat leg chronic skin venous changes, multiple eczematous spots  Neck: no jvd noted, limited ROM  Cardiovascular:  S1, S2 no r/g, 2/6 holosystolic murmur in LUSB  Respiratory: CTA B without w/r/r  Abdomen:  +bs, soft, nt, nd  Ext: chornic lower extremity edema up to knees  Psychiatric: mood and affect appropriate, occasional tangent speech. Musculoskeletal:  Limited spastic ROM, reduced muscle bulk.     Data:   Labs:  CBC:   Recent Labs     02/04/20  2216 02/05/20  0444 02/06/20  0517   WBC 3.5* 3.3* 3.5*   HGB 9.9* 8.5* 8.9*   * 106* 119*     BMP:    Recent Labs     02/04/20  2216 02/05/20  0444 02/06/20  0517   * 134* 137   K 5.0 5.4* 5.1    105 110   CO2 21 19* 19*   BUN 30* 28* 23*   CREATININE 1.0 1.1 1.0   GLUCOSE 118* 84 91     Ca/Mg/Phos:   Recent Labs     02/04/20  2216 02/05/20  0444 02/06/20  0517   CALCIUM 8.2* 7.8* 8.2*   MG 2.10  --   --    PHOS  --   --  2.8   Adjusted Ca+2 today to 8.7 g/dl    Hepatic:   Recent Labs     02/04/20 2216   AST 20   ALT 22   BILITOT 0.3   ALKPHOS 184*     Troponin: No results for input(s): TROPONINI in the last 72 hours. BNP: No results for input(s): BNP in the last 72 hours. Lipids: No results for input(s): CHOL, TRIG, HDL, LDLCALC, LABVLDL in the last 72 hours. ABGs: No results for input(s): PHART, PO2ART, ZAS9IVS in the last 72 hours. INR: No results for input(s): INR in the last 72 hours. UA:No results for input(s): [place] Song[place], GLUCOSEU, BILIRUBINUR, [place] Balzarine, BLOODU, PHUR, PROTEINU, UROBILINOGEN, NITRU, LEUKOCYTESUR, LABMICR, URINETYPE in the last 72 hours. Urine Microscopic: No results for input(s): LABCAST, BACTERIA, COMU, HYALCAST, WBCUA, RBCUA, EPIU in the last 72 hours. Urine Culture:   Recent Labs     02/05/20  0140   LABURIN >100,000 CFU/ml  No further workup       Urine Chemistry:   No results for input(s): CLUR, LABCREA, PROTEINUR, NAUR in the last 72 hours. IMAGING:  VL Extremity Venous Right         XR CHEST PORTABLE   Final Result      Unchanged small left pleural effusion and left basilar atelectasis versus pneumonia. XR CHEST STANDARD (2 VW)   Final Result      Left persistent pleural-based consolidation and pleural effusion. Slightly increased compared to prior study      Prior sternotomy. Right lung remains clear. Assessment/Plan   1. ALIS:     2. Hyperkalemia:  RTA 4     3. Acid- base/ Electrolyte imbalance: RTA 4      4. Normocytic anemia:     5.  HTN:     PLAN:  - Change to oral bicarb   - Low K diet   - K better   - Cr stable   - abx   - cont wyatt     Thank you for allowing us to participate in care of Agustin Choudhary MD

## 2020-02-07 NOTE — PROGRESS NOTES
ID Follow-up NOTE  Medical Student note - reviewed and modified, see Attending addendum at bottom    CC:   UTI  Antibiotics: None    Admit Date: 2/1/2020  Hospital Day: 7    Subjective:     Patient is lethargic and nonresponsive to voice and painful stimuli. Does not open eyes. Objective:     Patient Vitals for the past 8 hrs:   BP Temp Temp src Pulse Resp SpO2   02/07/20 0320 (!) 150/74 98.4 °F (36.9 °C) Oral 91 16 92 %     I/O last 3 completed shifts: In: 240 [P.O.:240]  Out: 2075 [Urine:2075]  No intake/output data recorded. EXAM:  GENERAL: No apparent distress. HEENT: Membranes moist, no oral lesion  NECK:  Supple, no lymphadenopathy  LUNGS: Clear b/l, no rales, no dullness  CARDIAC: RRR, no murmur appreciated  ABD:  + BS, soft / NT  EXT:  No rash, no edema, no lesions  NEURO: Nonresponsive to voice, painful stimuli.  Does not open eyes  PSYCH: Unable to assess  LINES:  Peripheral iv       Data Review:  Lab Results   Component Value Date    WBC 11.7 (H) 02/07/2020    HGB 9.5 (L) 02/07/2020    HCT 29.0 (L) 02/07/2020    MCV 98.8 02/07/2020     02/07/2020     Lab Results   Component Value Date    CREATININE 0.8 02/07/2020    BUN 24 (H) 02/07/2020     02/07/2020    K 5.4 (H) 02/07/2020     02/07/2020    CO2 22 02/07/2020       Hepatic Function Panel:   Lab Results   Component Value Date    ALKPHOS 184 02/04/2020    ALT 22 02/04/2020    AST 20 02/04/2020    PROT 6.4 02/04/2020    BILITOT 0.3 02/04/2020    BILIDIR <0.2 10/06/2019    IBILI see below 10/06/2019    LABALBU 2.9 02/04/2020       MICRO:  2/5 Repeat urine culture: +Candida albicans  2/1 Blood cultures: no growth after 4 days  2/1 Urine culture: +Pseudomonas aeruginosa  Antibiotic Interpretation YANCY   cefepime Sensitive 4 mcg/mL   ciprofloxacin Sensitive <=1 mcg/mL   gentamicin Sensitive <=4 mcg/mL   meropenem Sensitive <=1 mcg/mL   piperacillin-tazobactam Sensitive <=16 mcg/mL   tobramycin Sensitive <=4 mcg/mL IMAGIN/7 CT Head w/o: No evidence of acute intracranial abnormality.  CXR: Perihilar consolidation bilaterally compatible with atypical pneumonia or pulmonary edema.  CXR: Unchanged small left pleural effusion and left basilar atelectasis versus pneumonia.  CXR:   - Consolidative changes identified within the posterior medial aspect the left lower lobe correspond to rounded atelectatic changes on chest CT dated 2019.  - Small left pleural effusion.  - Hiatal hernia. Scheduled Meds:   gabapentin  300 mg Oral TID    sodium bicarbonate  650 mg Oral BID    polyethylene glycol  17 g Oral Daily    senna  2 tablet Oral Daily    Venelex   Topical BID    melatonin  3 mg Oral Nightly    zolpidem  5 mg Oral Nightly    levothyroxine  50 mcg Oral Daily    meropenem  1 g Intravenous Q8H    heparin (porcine)  5,000 Units Subcutaneous 3 times per day    [Held by provider] bethanechol  25 mg Oral TID    tamsulosin  0.4 mg Oral Daily    aspirin  325 mg Oral Nightly    ferrous sulfate  325 mg Oral Daily with breakfast    atorvastatin  80 mg Oral Nightly    isosorbide mononitrate  30 mg Oral Daily    famotidine  20 mg Oral BID    sodium chloride flush  10 mL Intravenous 2 times per day       Continuous Infusions:      PRN Meds:  niacin, sodium chloride flush, magnesium hydroxide, ondansetron, acetaminophen      Assessment:     Pt is a 72yo M with PMHx CAD, CABG x5, AF, CKD, venous stasis changes, and BPH with chronic indwelling Iglesias catheter who initially presented for catheter dislodgement and UTI. Cysto in 2019 showed neurogenic bladder. Hx of multiple UTIs. Pt presented to ED on  for dislodged Iglesias. After replacement, UA showed +leukocytes, + nitrites and WBC . Pt afebrile with WBC 7.1. Blood cultures were drawn, pt placed on Zosyn and admitted. In [facility], pt has had fatigue and lethargy.  Initial plan was to discharge on  with PO Cipro, but pt developed hypothermia (T 94.8). hyperkalemia (5.4), and hypocalcemia (7.8) with downtrending Plt (162 > 106). Pt has remained afebrile throughout stay w/o leukocytosis. Sx have persisted and pt has been unable to perform voiding trial per Uro recs. Urine culture from 2/1 grew Pseudomonas sensitive to meropenem, so he was started on this 2/5.    2/7 Pt's mental status worsening. Nonresponsive to voice/painful stimuli. CXR showed perihilar consolidation bilaterally compatible with atypical pneumonia or pulmonary edema. Afebrile. WBC 11.7. Plan:     - see below    Discussed with Dr. Violetta Wagner, MS IV    Addendum to Medical Student Progress note:  Pt seen,examined and evaluated. I have independently performed history, physical exam, lab and data review. I have determined assessment and plan as documented by student Tyron Lesch).    66 yo man with hx CAD, AF, CKD, BPH  Neurogenic bladder with chronic indwelling schneider  Hx UTI, see HPI for recent cult results  Lives alone in community     Presented with 'dislodged schneider'  In ED 2/1, afeb, WBC 7/1, UA +. Schneider changed. Urine cult + Ps aeruginosa     Pt developed lethargy, low temp, elevated K / low Ca++    Today 2/7 - pt is obtunded, unable to arouse.   No focal signs - VERY limited exam     IMP/  Chronic schneider  Admit with schneider problem  Pseudomonas bacteriuria vs UTI, favor bacteriuria    Encephalopathy - afebrile, increase in WBC, CXR with diffuse airspace d (poor quality, reviewed image), procalcitonin 0.82 - not clear he has infection though may have resp tract infection / pneumonia     REC/  Agree with BC, CXR, Head CT  Cont meropenem  Add linezolid  Add MRSA nasal PCR     Discussed with RN  Kimberly Nguyen MD

## 2020-02-07 NOTE — PLAN OF CARE
Problem: Falls - Risk of:  Goal: Will remain free from falls  Description  Will remain free from falls  Outcome: Ongoing  Note:   Patient with reported weakness, mobility impaired. Bed alarm on, locked and in low position. Hourly checks on needs. Safety maintained. Problem: Risk for Impaired Skin Integrity  Goal: Tissue integrity - skin and mucous membranes  Description  Structural intactness and normal physiological function of skin and  mucous membranes. 2/7/2020 1526 by Andrzej Britt RN  Note:   Skin stable. Patient bathed, on special alternating pressure low air loss mattress. Heel elevated with pillow and mepilex applied for protection. Continues with open areas on buttocks and coccyx, Verelex applied as ordered. Cleaned and redressed skin tears on bilateral arms. Abrasion on right shin cleaned and left aleksandr. Turned and repositioned with pillows wedging. 2/7/2020 0216 by Sydney Patterson RN  Outcome: Ongoing  Note:   Pt on specialty mattress, schneider in place. Pt turned onto side with pillow support. Heels elevated off bed onto pillow. Venelex applied to sacrum open areas. Problem: Urinary Elimination:  Goal: Signs and symptoms of infection will decrease  Description  Signs and symptoms of infection will decrease  2/7/2020 0216 by Sydney Patterson RN  Outcome: Ongoing  Note:   Schneider intact, urine output clear and yellow, with no malodor. Problem: Nutrition  Goal: Optimal nutrition therapy  2/7/2020 1526 by Andrzej Britt RN  Note:   Patient very lethargic today. No dietary input. Beau aware. 2/7/2020 1401 by Leticia Aguliera RD, LD  Outcome: Ongoing     Problem: Pain:  Description  Pain management should include both nonpharmacologic and pharmacologic interventions. Goal: Pain level will decrease  Description  Pain level will decrease  Note:   No visual distress when resting. Does not respond when asked about pain.     Goal: Control of acute pain  Description  Control of acute pain  Outcome: Ongoing

## 2020-02-07 NOTE — PROGRESS NOTES
New order for Lasix iv, given. B/p improved. Patient now combative, refusing to be touched. Will monitor.

## 2020-02-07 NOTE — PLAN OF CARE
Problem: Risk for Impaired Skin Integrity  Goal: Tissue integrity - skin and mucous membranes  Description  Structural intactness and normal physiological function of skin and  mucous membranes. Outcome: Ongoing  Note:   Pt on specialty mattress, schneider in place. Pt turned onto side with pillow support. Heels elevated off bed onto pillow. Venelex applied to sacrum open areas. Problem: Urinary Elimination:  Goal: Signs and symptoms of infection will decrease  Description  Signs and symptoms of infection will decrease  2/7/2020 0216 by Sherman Encarnacion RN  Outcome: Ongoing  Note:   Schneider intact, urine output clear and yellow, with no malodor.

## 2020-02-07 NOTE — CARE COORDINATION
CM following, Spoke with Teri at [facility] we have [place] for to CT when stable HENS completed 2/6/20 #[id number]. Pt will need transport at CT. Called Daughter Grecia Nguyen [phone] to see what she thinks about Dads status and his ability to be alone at home during the day. Awaiting a call back from Prakash Obrien to see of SNF or [facility] is what she thinks he needs. Pt refuses SNF. Needs to be cleared medically.    Electronically signed by Ghassan Echavarria RN on 2/7/2020 at 2:40 PM  [phone]

## 2020-02-07 NOTE — PROGRESS NOTES
VSS. Pt very drowsy, but arouses to voice then quickly falls back asleep. Oriented x4. Held [place] and melatonin tonight because pt was already sleeping and he has been sleeping all of tonight so far. Pt had small formed BM, occult stool sample sent. Will continue to monitor.

## 2020-02-07 NOTE — PROGRESS NOTES
Due to patient's ongoing worsening lethargy, decision made to decrease baclofen pump rate. [employer] contacted regarding decreasing baclofen pump rate. Ebony ([phone]) arrived at bedside at 3:38 pm and decreased infusion rate from 43.4 mcg/hr to 0.5mcg/hr. Pt's PMNR provider (Dr Ilene Bustamante) contacted prior who stated that would ok acceptable and on discharge he would see patient and would readjust his pump rate.      Lauren Primrose  Internal Medicine Resident  PGY-2

## 2020-02-07 NOTE — PLAN OF CARE
Problem: Nutrition  Goal: Optimal nutrition therapy  Outcome: Ongoing   Nutrition Problem: Inadequate Oral Intake   Intervention: Continue Current diet    Nutrition Goals:  Pt will tolerate diet and consume >75% of meals offered

## 2020-02-07 NOTE — PROGRESS NOTES
Output 2475 ml   Net -2235 ml     Review of Systems   Unable to perform ROS: Patient unresponsive     Physical Exam  Constitutional:       General: He is awake. He is not in acute distress. HENT:      Head: Normocephalic and atraumatic. Nose: Nose normal.   Eyes:      General: Vision grossly intact. Gaze aligned appropriately. Right eye: No discharge. Left eye: No discharge. Extraocular Movements: Extraocular movements intact. Conjunctiva/sclera: Conjunctivae normal.   Neck:      Musculoskeletal: Full passive range of motion without pain, normal range of motion and neck supple. Cardiovascular:      Rate and Rhythm: Normal rate and regular rhythm. Pulses: Normal pulses. Heart sounds: Normal heart sounds. Pulmonary:      Effort: Pulmonary effort is normal.      Breath sounds: Normal breath sounds. Abdominal:      General: There is no distension. There are no signs of injury. Palpations: Abdomen is soft. Tenderness: There is no abdominal tenderness. Musculoskeletal: Normal range of motion. General: Swelling present. No deformity or signs of injury. Skin:     General: Skin is warm. Neurological:      General: No focal deficit present. Mental Status: He is easily aroused. Motor: Motor function is intact. Coordination: Coordination is intact. Gait: Gait is intact. Psychiatric:         Attention and Perception: Attention and perception normal.         Mood and Affect: Affect normal.         Speech: Speech normal.         Behavior: Behavior is cooperative.          Cognition and Memory: Cognition normal.       LABS:    CBC:   Recent Labs     02/05/20  0444 02/06/20  0517 02/07/20  0540   WBC 3.3* 3.5* 11.7*   HGB 8.5* 8.9* 9.5*   HCT 26.2* 27.4* 29.0*   * 119* 145   MCV 99.0 100.1* 98.8     Renal:    Recent Labs     02/04/20  2216 02/05/20  0444 02/06/20  0517 02/07/20  0539   * 134* 137 139   K 5.0 5.4* 5.1 5.4*   CL 103 105 110 107   CO2 21 19* 19* 22   BUN 30* 28* 23* 24*   CREATININE 1.0 1.1 1.0 0.8   GLUCOSE 118* 84 91 111*   CALCIUM 8.2* 7.8* 8.2* 8.7   MG 2.10  --   --   --    PHOS  --   --  2.8  --    ANIONGAP 10 10 8 10     Hepatic:   Recent Labs     02/04/20  2216   AST 20   ALT 22   BILITOT 0.3   PROT 6.4   LABALBU 2.9*   ALKPHOS 184*     Troponin: No results for input(s): TROPONINI in the last 72 hours. BNP: No results for input(s): BNP in the last 72 hours. Lipids: No results for input(s): CHOL, HDL in the last 72 hours. Invalid input(s): LDLCALCU, TRIGLYCERIDE  ABGs:    Recent Labs     02/07/20  0945   PHART 7.352   KZB4PLE 44.4   PO2ART 95.3   UIE4BTN 24   BEART -1.0   C9ANZKOR 98   AGK3KDL 25       INR: No results for input(s): INR in the last 72 hours. Lactate: No results for input(s): LACTATE in the last 72 hours. Cultures:  -----------------------------------------------------------------  RAD:   CT HEAD WO CONTRAST   Final Result      No evidence of acute intracranial abnormality. XR CHEST PORTABLE   Final Result      Perihilar consolidation bilaterally compatible with atypical pneumonia or pulmonary edema. Cardiomegaly status post sternotomy. Silhouetting the left hemidiaphragm may be due to left lower lobe consolidation or portable technique. PA and lateral chest x-ray would be helpful for further evaluation. VL Extremity Venous Right         XR CHEST PORTABLE   Final Result      Unchanged small left pleural effusion and left basilar atelectasis versus pneumonia. XR CHEST STANDARD (2 VW)   Final Result      Left persistent pleural-based consolidation and pleural effusion. Slightly increased compared to prior study      Prior sternotomy. Right lung remains clear.         Assessment/Plan:     77M w/ PMH CAD s/p CABG, BPH, GERD, back pain, A-fib, CKD, venous stasis B/L LE wounds, and HLD presented on 02/01/20 w/ urinary retention after being unable to replace schneider.     1. AMS  - Patient w/ AMS on 02/07. Difficult to awaken, non-responsive, VSS.  - New onset leukocytosis (WBC 3.5 > 11.7). - Increased HR this AM (60s to > 90). - Etiolgy unclear. Infection vs. medication-associated. - CXR (02/07/20): Perihilar consolidation. Cardiomegaly. Silhouetting of left hemidiaphragm.  - Plan: Further work-up pending. Lower gabapentin dose to 300 TID. 2. Complicated UTI (2/2 to chronic indwelling catheter)  - Hx of multiple UTIs 2/2 chronic schneider (MDRO, carbapenem/zosyn sensitive). - UA (02/01/20): Leukocyte (+) / Nitrite (+). WBC . Pseudomonas (+). Meropenem-sensitive. - UA (02/05/20): Culture growing Candida.  - Plan: Meropenem 1 g IV q8h. Deescalate when possible. May need treatment for both Pseudomonas and Candidal UTI.     3. Urinary retention  - Cysto (01/2019): Likely neurogenic bladder. Small prostate.  - 2.1 L UOP last 24 hours w/ catheter in place.  - Plan: Tamsulosin 0.4 mg QD. Will keep catheter in place and plan for further work-up as outpatient (e.g., voiding trial, possible suprapubic catheter). Holding bethanechol d/t bradycardia.      4. Hypothyroidism  - TSH elevated (5.03) w/ low T4 (0.8) (02/05). - Continue synthroid 50 mcg QD.     5. Normocytic anemia  - HgB 9.5 today. - Ferritin 92.6 WNL (02/06/20). TIBC low (216). - Plan: Anemia work-up w/ iron studies, FOBT. Continue Iron 325 mg QD. 6. Venous stasis wounds (chronic)  - Wounds on b/l LE wounds. - Plan: Routine wound care. Consider podiatry consult.     7. CAD s/p CABG:   - Continue  mg QD.     8. HTN  - BP controlled. 150/74 this AM.  - Imdur 30 mg QD.     9. HLD  - Atorvastatin 80 mg qPM.     10. Neuromuscular spasticity  - Associated w/ functional quadriplegia   - Intrathecal baclofen pump (abdomen).     11. Neuropathy  - History of B12 deficiency per patient. - Low-normal B12 lab 212 (05/02/15). - Low B12 184 (02/07/20). - Plan: Continue gabapentin 300 mg TID.  Adding other meds may help lower gabapentin dose (e.g., Lyrica, duloxetine). Add B12 supplement.     12. GERD  - Famotidine 20 mg BID.      13. Insomnia  - Melatonin 3 mg qPM.  - Ambien 5 mg qPM.     14. PPX  - Heparin. Code Status: Full code  FEN: Cardiac  DISPO: Floor    Joselinsa Nya DO, PGY-1  02/07/20  11:18 AM    This patient has been staffed and discussed with Nadja Recinos MD.     Patient seen and examined, plan of care discussed with residents. Agree with their assessment and plan with following addendum:  Patient is lethargic today. Head CT, ABG okay so far. Maybe another course of infection vs baclofen pump problem- called rep to check settings. No discharge today.        Nadja Recinos

## 2020-02-07 NOTE — PROGRESS NOTES
Occupational Therapy/ Physical Therapy   Hold Note     Attempted to see pt this am for OT / PT. Pt on hold per RN 2/2 increased lethargy. Will follow later per plan of care.      Josh Taveras OTR/L  [address] [place] [address], DPT [id number]

## 2020-02-08 ENCOUNTER — APPOINTMENT (OUTPATIENT)
Dept: GENERAL RADIOLOGY | Age: 80
DRG: 698 | End: 2020-02-08
Payer: MEDICARE

## 2020-02-08 LAB
ANION GAP SERPL CALCULATED.3IONS-SCNC: 9 MMOL/L (ref 3–16)
BASOPHILS ABSOLUTE: 0 K/UL (ref 0–0.2)
BASOPHILS RELATIVE PERCENT: 0.3 %
BLOOD CULTURE, ROUTINE: NORMAL
BUN BLDV-MCNC: 21 MG/DL (ref 7–20)
CALCIUM SERPL-MCNC: 9 MG/DL (ref 8.3–10.6)
CHLORIDE BLD-SCNC: 105 MMOL/L (ref 99–110)
CO2: 25 MMOL/L (ref 21–32)
CREAT SERPL-MCNC: 0.7 MG/DL (ref 0.8–1.3)
CULTURE, BLOOD 2: NORMAL
EOSINOPHILS ABSOLUTE: 0.4 K/UL (ref 0–0.6)
EOSINOPHILS RELATIVE PERCENT: 3.6 %
GFR AFRICAN AMERICAN: >60
GFR NON-AFRICAN AMERICAN: >60
GLUCOSE BLD-MCNC: 77 MG/DL (ref 70–99)
HCT VFR BLD CALC: 32.3 % (ref 40.5–52.5)
HEMOGLOBIN: 10.6 G/DL (ref 13.5–17.5)
LYMPHOCYTES ABSOLUTE: 0.9 K/UL (ref 1–5.1)
LYMPHOCYTES RELATIVE PERCENT: 9.3 %
MCH RBC QN AUTO: 32.5 PG (ref 26–34)
MCHC RBC AUTO-ENTMCNC: 32.9 G/DL (ref 31–36)
MCV RBC AUTO: 98.9 FL (ref 80–100)
MONOCYTES ABSOLUTE: 0.6 K/UL (ref 0–1.3)
MONOCYTES RELATIVE PERCENT: 6.1 %
MRSA SCREEN RT-PCR: ABNORMAL
NEUTROPHILS ABSOLUTE: 8.1 K/UL (ref 1.7–7.7)
NEUTROPHILS RELATIVE PERCENT: 80.7 %
ORGANISM: ABNORMAL
PDW BLD-RTO: 15.2 % (ref 12.4–15.4)
PLATELET # BLD: 158 K/UL (ref 135–450)
PMV BLD AUTO: 9 FL (ref 5–10.5)
POTASSIUM REFLEX MAGNESIUM: 4.9 MMOL/L (ref 3.5–5.1)
RBC # BLD: 3.26 M/UL (ref 4.2–5.9)
SODIUM BLD-SCNC: 139 MMOL/L (ref 136–145)
URINE CULTURE, ROUTINE: NORMAL
WBC # BLD: 10 K/UL (ref 4–11)

## 2020-02-08 PROCEDURE — 6370000000 HC RX 637 (ALT 250 FOR IP): Performed by: INTERNAL MEDICINE

## 2020-02-08 PROCEDURE — 6370000000 HC RX 637 (ALT 250 FOR IP): Performed by: STUDENT IN AN ORGANIZED HEALTH CARE EDUCATION/TRAINING PROGRAM

## 2020-02-08 PROCEDURE — 2580000003 HC RX 258: Performed by: STUDENT IN AN ORGANIZED HEALTH CARE EDUCATION/TRAINING PROGRAM

## 2020-02-08 PROCEDURE — 85025 COMPLETE CBC W/AUTO DIFF WBC: CPT

## 2020-02-08 PROCEDURE — 36415 COLL VENOUS BLD VENIPUNCTURE: CPT

## 2020-02-08 PROCEDURE — 94640 AIRWAY INHALATION TREATMENT: CPT

## 2020-02-08 PROCEDURE — 1200000000 HC SEMI PRIVATE

## 2020-02-08 PROCEDURE — 71045 X-RAY EXAM CHEST 1 VIEW: CPT

## 2020-02-08 PROCEDURE — 6370000000 HC RX 637 (ALT 250 FOR IP): Performed by: UROLOGY

## 2020-02-08 PROCEDURE — 6360000002 HC RX W HCPCS: Performed by: STUDENT IN AN ORGANIZED HEALTH CARE EDUCATION/TRAINING PROGRAM

## 2020-02-08 PROCEDURE — 6360000002 HC RX W HCPCS: Performed by: INTERNAL MEDICINE

## 2020-02-08 PROCEDURE — 80048 BASIC METABOLIC PNL TOTAL CA: CPT

## 2020-02-08 RX ADMIN — ASPIRIN 325 MG ORAL TABLET 325 MG: 325 PILL ORAL at 20:32

## 2020-02-08 RX ADMIN — MEROPENEM 1 G: 1 INJECTION, POWDER, FOR SOLUTION INTRAVENOUS at 15:17

## 2020-02-08 RX ADMIN — ATORVASTATIN CALCIUM 80 MG: 80 TABLET, FILM COATED ORAL at 20:32

## 2020-02-08 RX ADMIN — SODIUM BICARBONATE 650 MG TABLET 650 MG: at 09:20

## 2020-02-08 RX ADMIN — CASTOR OIL AND BALSAM, PERU: 788; 87 OINTMENT TOPICAL at 20:35

## 2020-02-08 RX ADMIN — MEROPENEM 1 G: 1 INJECTION, POWDER, FOR SOLUTION INTRAVENOUS at 06:26

## 2020-02-08 RX ADMIN — TAMSULOSIN HYDROCHLORIDE 0.4 MG: 0.4 CAPSULE ORAL at 09:20

## 2020-02-08 RX ADMIN — LINEZOLID 600 MG: 600 INJECTION, SOLUTION INTRAVENOUS at 17:34

## 2020-02-08 RX ADMIN — MEROPENEM 1 G: 1 INJECTION, POWDER, FOR SOLUTION INTRAVENOUS at 22:14

## 2020-02-08 RX ADMIN — HEPARIN SODIUM 5000 UNITS: 5000 INJECTION INTRAVENOUS; SUBCUTANEOUS at 15:16

## 2020-02-08 RX ADMIN — GABAPENTIN 300 MG: 600 TABLET, FILM COATED ORAL at 09:20

## 2020-02-08 RX ADMIN — SODIUM BICARBONATE 650 MG TABLET 650 MG: at 20:32

## 2020-02-08 RX ADMIN — CASTOR OIL AND BALSAM, PERU: 788; 87 OINTMENT TOPICAL at 09:22

## 2020-02-08 RX ADMIN — GABAPENTIN 300 MG: 600 TABLET, FILM COATED ORAL at 20:32

## 2020-02-08 RX ADMIN — HEPARIN SODIUM 5000 UNITS: 5000 INJECTION INTRAVENOUS; SUBCUTANEOUS at 06:25

## 2020-02-08 RX ADMIN — ZOLPIDEM TARTRATE 5 MG: 5 TABLET ORAL at 20:32

## 2020-02-08 RX ADMIN — FAMOTIDINE 20 MG: 20 TABLET ORAL at 09:20

## 2020-02-08 RX ADMIN — GABAPENTIN 300 MG: 600 TABLET, FILM COATED ORAL at 15:17

## 2020-02-08 RX ADMIN — IPRATROPIUM BROMIDE AND ALBUTEROL SULFATE 1 AMPULE: .5; 3 SOLUTION RESPIRATORY (INHALATION) at 00:49

## 2020-02-08 RX ADMIN — ISOSORBIDE MONONITRATE 30 MG: 30 TABLET, EXTENDED RELEASE ORAL at 09:20

## 2020-02-08 RX ADMIN — CYANOCOBALAMIN 1000 MCG: 1000 INJECTION, SOLUTION INTRAMUSCULAR; SUBCUTANEOUS at 09:21

## 2020-02-08 RX ADMIN — Medication 3 MG: at 22:13

## 2020-02-08 RX ADMIN — LEVOTHYROXINE SODIUM 50 MCG: 0.05 TABLET ORAL at 06:25

## 2020-02-08 RX ADMIN — LINEZOLID 600 MG: 600 INJECTION, SOLUTION INTRAVENOUS at 05:04

## 2020-02-08 RX ADMIN — FERROUS SULFATE TAB 325 MG (65 MG ELEMENTAL FE) 325 MG: 325 (65 FE) TAB at 09:21

## 2020-02-08 RX ADMIN — Medication 10 ML: at 09:22

## 2020-02-08 RX ADMIN — ACETAMINOPHEN 650 MG: 325 TABLET ORAL at 09:20

## 2020-02-08 RX ADMIN — HEPARIN SODIUM 5000 UNITS: 5000 INJECTION INTRAVENOUS; SUBCUTANEOUS at 22:14

## 2020-02-08 RX ADMIN — Medication 10 ML: at 20:36

## 2020-02-08 RX ADMIN — FAMOTIDINE 20 MG: 20 TABLET ORAL at 20:32

## 2020-02-08 ASSESSMENT — PAIN DESCRIPTION - DESCRIPTORS
DESCRIPTORS: ACHING
DESCRIPTORS: ACHING

## 2020-02-08 ASSESSMENT — PAIN DESCRIPTION - LOCATION
LOCATION: EAR;FOOT
LOCATION: FOOT

## 2020-02-08 ASSESSMENT — PAIN DESCRIPTION - PAIN TYPE
TYPE: ACUTE PAIN
TYPE: ACUTE PAIN

## 2020-02-08 ASSESSMENT — PAIN DESCRIPTION - ORIENTATION
ORIENTATION: RIGHT;LEFT
ORIENTATION: RIGHT;LEFT;UPPER

## 2020-02-08 ASSESSMENT — PAIN SCALES - GENERAL
PAINLEVEL_OUTOF10: 0
PAINLEVEL_OUTOF10: 4
PAINLEVEL_OUTOF10: 0
PAINLEVEL_OUTOF10: 8

## 2020-02-08 ASSESSMENT — PAIN DESCRIPTION - FREQUENCY
FREQUENCY: INTERMITTENT
FREQUENCY: INTERMITTENT

## 2020-02-08 ASSESSMENT — VISUAL ACUITY: OU: 1

## 2020-02-08 NOTE — PROGRESS NOTES
Patient A&O, VSS. Iglesias cath intact, w/ adequate urine output clear, straw colored. Patient repositioned Q2 hours, hip to hip, to prevent further skin breakdown. Venelex applied as needed. IV abx given per orders. Patient given PRN pain medication as ordered, will continue to monitor as needed. /62   Pulse 126   Temp 97.4 °F (36.3 °C) (Oral)   Resp 18   Ht 5' 11\" (1.803 m)   Wt 186 lb (84.4 kg)   SpO2 92%   BMI 25.94 kg/m²   Patient denies any further needs at this time. Bed is in the lowest position, bed alarm on, patient call light and bedside table are within reach. Will continue to monitor for changes in patient status.       Electronically signed by Paras Collier on 2/8/2020 at 4:26 PM

## 2020-02-08 NOTE — PLAN OF CARE
Problem: Risk for Impaired Skin Integrity  Goal: Tissue integrity - skin and mucous membranes  Description  Structural intactness and normal physiological function of skin and  mucous membranes. 2/8/2020 1618 by Crystal Toro  Outcome: Ongoing  Note:   Patient turned Q2 hours, hip to hip. On specialty mattress to prevent further skin breakdown. Preventative dressing applied to heels. Will continue to monitor patient for any changes. Problem: Urinary Elimination:  Goal: Signs and symptoms of infection will decrease  Description  Signs and symptoms of infection will decrease  2/8/2020 1618 by Crystal Toro  Outcome: Ongoing  Note:   Urine clear and straw colored. Will continue to monitor for any changes.

## 2020-02-08 NOTE — PROGRESS NOTES
Pt having wheezing in bilateral lungs. Medicine aware, one time breathing treatment ordered, which pt stated has not helped him per RT. CXR ordered. WIll continue to monitor.

## 2020-02-08 NOTE — PROGRESS NOTES
Pt awake and able to identify where he is and name/, unable to tell what month/day it was. Able to take all of night time medications tonight whole with water. Pt c/o some L. Arm pain, PRN tylenol given. Had 2 soft BMs so far tonight. Pt cleaned up and turned q2hr, venelex cream applied to wounds on bottom. Will continue to monitor.

## 2020-02-08 NOTE — PROGRESS NOTES
Eyes:      General: Vision grossly intact. Gaze aligned appropriately. Right eye: No discharge. Left eye: No discharge. Extraocular Movements: Extraocular movements intact. Conjunctiva/sclera: Conjunctivae normal.   Neck:      Musculoskeletal: Full passive range of motion without pain, normal range of motion and neck supple. Cardiovascular:      Rate and Rhythm: Normal rate and regular rhythm. Pulses: Normal pulses. Heart sounds: Normal heart sounds. Pulmonary:      Effort: Pulmonary effort is normal.      Breath sounds: Normal breath sounds. Abdominal:      General: There is no distension. There are no signs of injury. Palpations: Abdomen is soft. Tenderness: There is no abdominal tenderness. Genitourinary:     Comments: Iglesias in place. Draining clear, light yellow urine. Musculoskeletal: Normal range of motion. General: Swelling present. No deformity or signs of injury. Skin:     General: Skin is warm. Neurological:      General: No focal deficit present. Mental Status: He is easily aroused. Motor: Motor function is intact. Coordination: Coordination is intact. Gait: Gait is intact. Psychiatric:         Attention and Perception: Attention and perception normal.         Mood and Affect: Affect normal.         Speech: Speech normal.         Behavior: Behavior is cooperative.          Cognition and Memory: Cognition normal.       LABS:    CBC:   Recent Labs     02/06/20  0517 02/07/20  0540 02/08/20  0439   WBC 3.5* 11.7* 10.0   HGB 8.9* 9.5* 10.6*   HCT 27.4* 29.0* 32.3*   * 145 158   .1* 98.8 98.9     Renal:    Recent Labs     02/06/20  0517 02/07/20  0539 02/08/20  0439    139 139   K 5.1 5.4* 4.9    107 105   CO2 19* 22 25   BUN 23* 24* 21*   CREATININE 1.0 0.8 0.7*   GLUCOSE 91 111* 77   CALCIUM 8.2* 8.7 9.0   PHOS 2.8  --   --    ANIONGAP 8 10 9     Hepatic:   No results for input(s): AST, ALT, BILITOT, BILIDIR, PROT, LABALBU, ALKPHOS in the last 72 hours. Troponin: No results for input(s): TROPONINI in the last 72 hours. BNP: No results for input(s): BNP in the last 72 hours. Lipids: No results for input(s): CHOL, HDL in the last 72 hours. Invalid input(s): LDLCALCU, TRIGLYCERIDE  ABGs:    Recent Labs     02/07/20  0945   PHART 7.352   FTD6FKE 44.4   PO2ART 95.3   CFF6CDA 24   BEART -1.0   P2BUFHBD 98   FXM0WCH 25       INR: No results for input(s): INR in the last 72 hours. Lactate: No results for input(s): LACTATE in the last 72 hours. Cultures:  -----------------------------------------------------------------  RAD:   XR CHEST PORTABLE   Final Result   Cardiomegaly. Small left pleural effusion. Diffuse airspace opacities in    both lungs with interstitial prominence appears slightly improved and    could represent improving pulmonary edema or pneumonia. CT HEAD WO CONTRAST   Final Result      No evidence of acute intracranial abnormality. XR CHEST PORTABLE   Final Result      Perihilar consolidation bilaterally compatible with atypical pneumonia or pulmonary edema. Cardiomegaly status post sternotomy. Silhouetting the left hemidiaphragm may be due to left lower lobe consolidation or portable technique. PA and lateral chest x-ray would be helpful for further evaluation. VL Extremity Venous Right   Final Result      XR CHEST PORTABLE   Final Result      Unchanged small left pleural effusion and left basilar atelectasis versus pneumonia. XR CHEST STANDARD (2 VW)   Final Result      Left persistent pleural-based consolidation and pleural effusion. Slightly increased compared to prior study      Prior sternotomy. Right lung remains clear. Assessment/Plan:     77M w/ PMH CAD s/p CABG, BPH, GERD, back pain, A-fib, CKD, venous stasis B/L LE wounds, and HLD presented on 02/01/20 w/ urinary retention after being unable to replace schneider.      1. AMS, resolved  - Patient w/ AMS on 02/07. Difficult to awaken, non-responsive, VSS.  - New onset leukocytosis (WBC 3.5 > 11.7). - Etiolgy unclear; likely medication-associated vs infection (unexplained leukocytosis). - CXR (02/07/20): Perihilar consolidation. Cardiomegaly. Silhouetting of left hemidiaphragm. - yesterday baclofen pump rate decreased to minimum  - gabapentin dosage decreased to 300mg TID. - Plan: continue lower gabapentin dose to 300 TID. 2. Complicated UTI (2/2 to chronic indwelling catheter)  - Hx of multiple UTIs 2/2 chronic schneider (MDRO, carbapenem/zosyn sensitive). - UA (02/01/20): Leukocyte (+) / Nitrite (+). WBC . Pseudomonas (+). Meropenem-sensitive. - UA (02/05/20): Culture growing Candida.  - Plan: Meropenem 1 g IV q8h. Continue Linezolid (added in setting of unexplained leukocytosis). Deescalate when possible.      3. Urinary retention  - Cysto (01/2019): Likely neurogenic bladder. Small prostate.  - 2.1 L UOP last 24 hours w/ catheter in place.  - Plan: Tamsulosin 0.4 mg QD. Will keep catheter in place and plan for further work-up as outpatient (e.g., voiding trial, possible suprapubic catheter). Holding bethanechol d/t bradycardia.      4. Hypothyroidism  - TSH elevated (5.03) w/ low T4 (0.8) (02/05). - Continue synthroid 50 mcg QD.     5. Normocytic anemia  - HgB 9.5 today. - Ferritin 92.6 WNL (02/06/20). TIBC low (216). - Plan: Anemia work-up w/ iron studies, FOBT. Continue Iron 325 mg QD. 6. Venous stasis wounds (chronic)  - Wounds on b/l LE wounds. - Plan: Routine wound care. Consider podiatry consult.     7. CAD s/p CABG:   - Continue  mg QD.     8. HTN  - BP controlled. 150/74 this AM.  - Imdur 30 mg QD.     9. HLD  - Atorvastatin 80 mg qPM.     10. Neuromuscular spasticity  - Associated w/ functional quadriplegia   - Intrathecal baclofen pump (abdomen).     11. Neuropathy  - History of B12 deficiency per patient. - Low-normal B12 lab 212 (05/02/15).   - Low B12 184 (02/07/20). - Plan: Continue gabapentin 300 mg TID. Adding other meds may help lower gabapentin dose (e.g., Lyrica, duloxetine). Add B12 supplement.     12. GERD  - Famotidine 20 mg BID.      13. Insomnia  - Melatonin 3 mg qPM.  - Ambien 5 mg qPM.     14. PPX  - Heparin. Code Status: Full code  FEN: Cardiac  DISPO: Paris Mane MD, PGY-2  02/08/20  1:03 PM    This patient has been staffed and discussed with Vee Vera MD.       Patient seen and examined, plan of care discussed with residents. Agree with their assessment and plan with following addendum:    Patient is much more awake today, alert and oriented, cont with current antibiotics, await repeat culture results.      Vee Vera

## 2020-02-08 NOTE — PROGRESS NOTES
[facility] pain management staff decreased implanted Baclophen infusion dose to minimum level. Patient more responsive, but confused. Report given to pm nurse to monitor.

## 2020-02-08 NOTE — PROGRESS NOTES
Received call from [facility], pt + MRSA. Pt placed in isolation.  Electronically signed by Junaid Burnett RN on 2/8/2020 at 1:51 PM

## 2020-02-09 LAB
ANION GAP SERPL CALCULATED.3IONS-SCNC: 12 MMOL/L (ref 3–16)
BASOPHILS ABSOLUTE: 0 K/UL (ref 0–0.2)
BASOPHILS RELATIVE PERCENT: 0.4 %
BUN BLDV-MCNC: 21 MG/DL (ref 7–20)
CALCIUM SERPL-MCNC: 8.7 MG/DL (ref 8.3–10.6)
CHLORIDE BLD-SCNC: 105 MMOL/L (ref 99–110)
CO2: 22 MMOL/L (ref 21–32)
CREAT SERPL-MCNC: 1 MG/DL (ref 0.8–1.3)
EOSINOPHILS ABSOLUTE: 0.1 K/UL (ref 0–0.6)
EOSINOPHILS RELATIVE PERCENT: 1.6 %
GFR AFRICAN AMERICAN: >60
GFR NON-AFRICAN AMERICAN: >60
GLUCOSE BLD-MCNC: 111 MG/DL (ref 70–99)
HCT VFR BLD CALC: 28.4 % (ref 40.5–52.5)
HEMOGLOBIN: 9.5 G/DL (ref 13.5–17.5)
INTRINSIC FACTOR BLOCKING AB: NEGATIVE
LYMPHOCYTES ABSOLUTE: 0.8 K/UL (ref 1–5.1)
LYMPHOCYTES RELATIVE PERCENT: 9.5 %
MCH RBC QN AUTO: 32.3 PG (ref 26–34)
MCHC RBC AUTO-ENTMCNC: 33.4 G/DL (ref 31–36)
MCV RBC AUTO: 96.8 FL (ref 80–100)
MONOCYTES ABSOLUTE: 0.6 K/UL (ref 0–1.3)
MONOCYTES RELATIVE PERCENT: 7.6 %
NEUTROPHILS ABSOLUTE: 6.7 K/UL (ref 1.7–7.7)
NEUTROPHILS RELATIVE PERCENT: 80.9 %
PDW BLD-RTO: 15.6 % (ref 12.4–15.4)
PLATELET # BLD: 157 K/UL (ref 135–450)
PMV BLD AUTO: 8.8 FL (ref 5–10.5)
POTASSIUM REFLEX MAGNESIUM: 5.1 MMOL/L (ref 3.5–5.1)
PROCALCITONIN: 0.56 NG/ML (ref 0–0.15)
RBC # BLD: 2.93 M/UL (ref 4.2–5.9)
SODIUM BLD-SCNC: 139 MMOL/L (ref 136–145)
WBC # BLD: 8.3 K/UL (ref 4–11)

## 2020-02-09 PROCEDURE — 2580000003 HC RX 258: Performed by: STUDENT IN AN ORGANIZED HEALTH CARE EDUCATION/TRAINING PROGRAM

## 2020-02-09 PROCEDURE — 6360000002 HC RX W HCPCS: Performed by: STUDENT IN AN ORGANIZED HEALTH CARE EDUCATION/TRAINING PROGRAM

## 2020-02-09 PROCEDURE — 1200000000 HC SEMI PRIVATE

## 2020-02-09 PROCEDURE — 6370000000 HC RX 637 (ALT 250 FOR IP): Performed by: INTERNAL MEDICINE

## 2020-02-09 PROCEDURE — 6370000000 HC RX 637 (ALT 250 FOR IP): Performed by: UROLOGY

## 2020-02-09 PROCEDURE — 6370000000 HC RX 637 (ALT 250 FOR IP): Performed by: STUDENT IN AN ORGANIZED HEALTH CARE EDUCATION/TRAINING PROGRAM

## 2020-02-09 PROCEDURE — 84145 PROCALCITONIN (PCT): CPT

## 2020-02-09 PROCEDURE — 36415 COLL VENOUS BLD VENIPUNCTURE: CPT

## 2020-02-09 PROCEDURE — 6360000002 HC RX W HCPCS: Performed by: INTERNAL MEDICINE

## 2020-02-09 PROCEDURE — 85025 COMPLETE CBC W/AUTO DIFF WBC: CPT

## 2020-02-09 PROCEDURE — 80048 BASIC METABOLIC PNL TOTAL CA: CPT

## 2020-02-09 RX ORDER — ASPIRIN 81 MG/1
81 TABLET, CHEWABLE ORAL DAILY
Status: DISCONTINUED | OUTPATIENT
Start: 2020-02-09 | End: 2020-02-13 | Stop reason: HOSPADM

## 2020-02-09 RX ADMIN — FAMOTIDINE 20 MG: 20 TABLET ORAL at 09:33

## 2020-02-09 RX ADMIN — MEROPENEM 1 G: 1 INJECTION, POWDER, FOR SOLUTION INTRAVENOUS at 07:00

## 2020-02-09 RX ADMIN — TAMSULOSIN HYDROCHLORIDE 0.4 MG: 0.4 CAPSULE ORAL at 09:34

## 2020-02-09 RX ADMIN — Medication 3 MG: at 20:30

## 2020-02-09 RX ADMIN — SODIUM BICARBONATE 650 MG TABLET 650 MG: at 09:33

## 2020-02-09 RX ADMIN — ATORVASTATIN CALCIUM 80 MG: 80 TABLET, FILM COATED ORAL at 20:30

## 2020-02-09 RX ADMIN — GABAPENTIN 300 MG: 600 TABLET, FILM COATED ORAL at 09:33

## 2020-02-09 RX ADMIN — CYANOCOBALAMIN 1000 MCG: 1000 INJECTION, SOLUTION INTRAMUSCULAR; SUBCUTANEOUS at 09:38

## 2020-02-09 RX ADMIN — HEPARIN SODIUM 5000 UNITS: 5000 INJECTION INTRAVENOUS; SUBCUTANEOUS at 22:49

## 2020-02-09 RX ADMIN — GABAPENTIN 300 MG: 600 TABLET, FILM COATED ORAL at 14:17

## 2020-02-09 RX ADMIN — MEROPENEM 1 G: 1 INJECTION, POWDER, FOR SOLUTION INTRAVENOUS at 14:17

## 2020-02-09 RX ADMIN — FAMOTIDINE 20 MG: 20 TABLET ORAL at 20:30

## 2020-02-09 RX ADMIN — HEPARIN SODIUM 5000 UNITS: 5000 INJECTION INTRAVENOUS; SUBCUTANEOUS at 14:17

## 2020-02-09 RX ADMIN — Medication 10 ML: at 09:49

## 2020-02-09 RX ADMIN — CASTOR OIL AND BALSAM, PERU: 788; 87 OINTMENT TOPICAL at 09:42

## 2020-02-09 RX ADMIN — LINEZOLID 600 MG: 600 INJECTION, SOLUTION INTRAVENOUS at 16:25

## 2020-02-09 RX ADMIN — Medication 10 ML: at 20:30

## 2020-02-09 RX ADMIN — ASPIRIN 81 MG 81 MG: 81 TABLET ORAL at 09:48

## 2020-02-09 RX ADMIN — MEROPENEM 1 G: 1 INJECTION, POWDER, FOR SOLUTION INTRAVENOUS at 22:49

## 2020-02-09 RX ADMIN — LEVOTHYROXINE SODIUM 50 MCG: 0.05 TABLET ORAL at 07:04

## 2020-02-09 RX ADMIN — CASTOR OIL AND BALSAM, PERU: 788; 87 OINTMENT TOPICAL at 20:30

## 2020-02-09 RX ADMIN — ISOSORBIDE MONONITRATE 30 MG: 30 TABLET, EXTENDED RELEASE ORAL at 09:33

## 2020-02-09 RX ADMIN — GABAPENTIN 300 MG: 600 TABLET, FILM COATED ORAL at 20:30

## 2020-02-09 RX ADMIN — HEPARIN SODIUM 5000 UNITS: 5000 INJECTION INTRAVENOUS; SUBCUTANEOUS at 05:29

## 2020-02-09 RX ADMIN — SENNOSIDES 17.2 MG: 8.6 TABLET, FILM COATED ORAL at 09:33

## 2020-02-09 RX ADMIN — ZOLPIDEM TARTRATE 5 MG: 5 TABLET ORAL at 20:30

## 2020-02-09 RX ADMIN — SODIUM BICARBONATE 650 MG TABLET 650 MG: at 20:30

## 2020-02-09 RX ADMIN — POLYETHYLENE GLYCOL 3350 17 G: 17 POWDER, FOR SOLUTION ORAL at 09:39

## 2020-02-09 RX ADMIN — ACETAMINOPHEN 650 MG: 325 TABLET ORAL at 20:30

## 2020-02-09 RX ADMIN — FERROUS SULFATE TAB 325 MG (65 MG ELEMENTAL FE) 325 MG: 325 (65 FE) TAB at 09:33

## 2020-02-09 RX ADMIN — LINEZOLID 600 MG: 600 INJECTION, SOLUTION INTRAVENOUS at 05:29

## 2020-02-09 ASSESSMENT — ENCOUNTER SYMPTOMS
SHORTNESS OF BREATH: 0
CONSTIPATION: 0
VOMITING: 1
BACK PAIN: 0
DIARRHEA: 0
EYE PAIN: 0
SINUS PAIN: 0
ABDOMINAL PAIN: 0

## 2020-02-09 ASSESSMENT — PAIN SCALES - GENERAL
PAINLEVEL_OUTOF10: 0
PAINLEVEL_OUTOF10: 5

## 2020-02-09 ASSESSMENT — VISUAL ACUITY: OU: 1

## 2020-02-09 NOTE — PROGRESS NOTES
Nephrology Consult Note                                                                                                                                                                                                                                                                                                                              Patient's Name: Tia MALLORY better   Bicarb better   Good UO   Iglesias in place     Past Medical History:   Diagnosis Date    BPH (benign prostatic hyperplasia)     Carotid stenosis 12/2013    JESU 42-75% stenosis; LICA 44-77% stenosis    Cellulitis 12/2013    LLE    Chronic back pain     Diverticular disease     Erectile dysfunction     GERD (gastroesophageal reflux disease)     History of atrial fibrillation     Hypercholesteremia     Hypertension     Lower GI bleed     MDRO (multiple drug resistant organisms) resistance 10/26/2019    urine    Neuromuscular disorder (HCC)     spasticity    Renal insufficiency     Risk for falls     uses walker    Tinea corporis 12/2013    LLE    Vitamin B12 deficiency        Past Surgical History:   Procedure Laterality Date    BACK SURGERY  2006    lower lumbar    CORONARY ARTERY BYPASS GRAFT  12/13/2013    CABG x 5 (Dr Derek Teresa)    CYSTOSCOPY N/A 1/10/2019    CYSTOSCOPY performed by Antonio Robins MD at [place] [address] Right 5/1/2015    [facility]       Family History   Problem Relation Age of Onset    Heart Disease Father         reports that he quit smoking about 50 years ago. He has never used smokeless tobacco. He reports that he does not drink alcohol or use drugs.     Allergies:  Bactrim [sulfamethoxazole-trimethoprim]    Current Medications:    gabapentin (NEURONTIN) tablet 300 mg, TID  cyanocobalamin injection 1,000 mcg, Daily  linezolid (ZYVOX) IVPB 600 mg, Q12H  melatonin tablet 3 mg, Nightly PRN  zolpidem (AMBIEN) tablet 5 mg, Nightly PRN  sodium bicarbonate tablet 650 mg, BID  polyethylene glycol (GLYCOLAX) packet 17 g, Daily  senna (SENOKOT) tablet 17.2 mg, Daily  Venelex ointment, BID  levothyroxine (SYNTHROID) tablet 50 mcg, Daily  meropenem (MERREM) 1 g in sodium chloride 0.9 % 100 mL IVPB (mini-bag), Q8H  heparin (porcine) injection 5,000 Units, 3 times per day  tamsulosin (FLOMAX) capsule 0.4 mg, Daily  aspirin tablet 325 mg, Nightly  ferrous sulfate tablet 325 mg, Daily with breakfast  atorvastatin (LIPITOR) tablet 80 mg, Nightly  isosorbide mononitrate (IMDUR) extended release tablet 30 mg, Daily  niacin (NIASPAN) extended release tablet 500 mg, Nightly PRN  famotidine (PEPCID) tablet 20 mg, BID  sodium chloride flush 0.9 % injection 10 mL, 2 times per day  sodium chloride flush 0.9 % injection 10 mL, PRN  magnesium hydroxide (MILK OF MAGNESIA) 400 MG/5ML suspension 30 mL, Daily PRN  ondansetron (ZOFRAN) injection 4 mg, Q6H PRN  acetaminophen (TYLENOL) tablet 650 mg, Q4H PRN        Review of Systems:   14 point ROS obtained but were negative except mentioned in HPI      Physical exam:     Vitals:  BP (!) 114/54   Pulse 103   Temp 96.9 °F (36.1 °C) (Oral)   Resp 18   Ht 5' 11\" (1.803 m)   Wt 186 lb (84.4 kg)   SpO2 91%   BMI 25.94 kg/m²   Constitutional:  OAA X2 (self, place), NAD  Skin: noted bilat leg chronic skin venous changes, multiple eczematous spots  Neck: no jvd noted, limited ROM  Cardiovascular:  S1, S2 no r/g, 2/6 holosystolic murmur in LUSB  Respiratory: CTA B without w/r/r  Abdomen:  +bs, soft, nt, nd  Ext: chornic lower extremity edema up to knees  Psychiatric: mood and affect appropriate, occasional tangent speech. Musculoskeletal:  Limited spastic ROM, reduced muscle bulk.     Data:   Labs:  CBC:   Recent Labs     02/06/20  0517 02/07/20  0540 02/08/20  0439   WBC 3.5* 11.7* 10.0   HGB 8.9* 9.5* 10.6*   * 145 158     BMP:    Recent Labs     02/06/20  0517 02/07/20  0539 02/08/20  0439    139 139   K 5.1 5.4* 4.9    107 105   CO2 19* 22 25   BUN 23* 24* 21*   CREATININE 1.0 0.8 0.7*   GLUCOSE 91 111* 77     Ca/Mg/Phos:   Recent Labs     02/06/20  0517 02/07/20  0539 02/08/20  0439   CALCIUM 8.2* 8.7 9.0   PHOS 2.8  --   --    Adjusted Ca+2 today to 8.7 g/dl    Hepatic:   No results for input(s): AST, ALT, ALB, BILITOT, ALKPHOS in the last 72 hours. Troponin: No results for input(s): TROPONINI in the last 72 hours. BNP: No results for input(s): BNP in the last 72 hours. Lipids: No results for input(s): CHOL, TRIG, HDL, LDLCALC, LABVLDL in the last 72 hours. ABGs:   Recent Labs     02/07/20  0945   PHART 7.352   PO2ART 95.3   LGZ5VGO 44.4     INR: No results for input(s): INR in the last 72 hours. UA:  Recent Labs     02/07/20  0930   COLORU Yellow   CLARITYU Clear   GLUCOSEU Negative   BILIRUBINUR Negative   KETUA Negative   SPECGRAV >=1.030   BLOODU Negative   PHUR 6.0   PROTEINU Negative   UROBILINOGEN 0.2   NITRU Negative   LEUKOCYTESUR TRACE*   LABMICR YES   URINETYPE NotGiven      Urine Microscopic:   Recent Labs     02/07/20  0930   WBCUA 6-10*   RBCUA 3-5*     Urine Culture:   Recent Labs     02/07/20  0930   LABURIN >50,000 CFU/ml mixed skin/urogenital anne. No further workup     Urine Chemistry:   No results for input(s): CLUR, LABCREA, PROTEINUR, NAUR in the last 72 hours. IMAGING:  XR CHEST PORTABLE   Final Result   Cardiomegaly. Small left pleural effusion. Diffuse airspace opacities in    both lungs with interstitial prominence appears slightly improved and    could represent improving pulmonary edema or pneumonia. CT HEAD WO CONTRAST   Final Result      No evidence of acute intracranial abnormality. XR CHEST PORTABLE   Final Result      Perihilar consolidation bilaterally compatible with atypical pneumonia or pulmonary edema. Cardiomegaly status post sternotomy. Silhouetting the left hemidiaphragm may be due to left lower lobe consolidation or portable technique.  PA and lateral chest x-ray would be helpful for further evaluation. VL Extremity Venous Right   Final Result      XR CHEST PORTABLE   Final Result      Unchanged small left pleural effusion and left basilar atelectasis versus pneumonia. XR CHEST STANDARD (2 VW)   Final Result      Left persistent pleural-based consolidation and pleural effusion. Slightly increased compared to prior study      Prior sternotomy. Right lung remains clear. Assessment/Plan   1. ALIS:     2. Hyperkalemia:  RTA 4     3. Acid- base/ Electrolyte imbalance: RTA 4      4. Normocytic anemia:     5.  HTN:     PLAN:  - Changed to oral bicarb   - Low K diet   - Monitor K   - Cr stable   - abx   - cont wyatt     Thank you for allowing us to participate in care of Rich Mcneill MD

## 2020-02-09 NOTE — PLAN OF CARE
Problem: Pain:  Description  Pain management should include both nonpharmacologic and pharmacologic interventions. Goal: Pain level will decrease  Description  Pain level will decrease  Outcome: Met This Shift   Pt reports no pain at this time, 0/10 on pain scale. Will continue to monitor. Problem: Falls - Risk of:  Goal: Will remain free from falls  Description  Will remain free from falls  Outcome: Ongoing   Pt has been absent of falls. Fall precautions in place. Bed is low and locked. 3/4 side rails in place. Nonskid socks off, pt is bed and non ambulatory. Bed alarm on. Call light and bedside table within reach. Will continue to monitor.

## 2020-02-09 NOTE — PROGRESS NOTES
Pt is alert to self but confused. RN walked on Pt having a conversation alone. RN asked pt who he was talking to and he said his girlfriend. Dressing changes completed. Pt is being turn and repositioned Q2H. Pt has +1 pitting edema that is generalized. HOB elevated. Pt has RASHIDA expiratory wheezing noted. VSS. IV ABX given as ordered. Pt had x1 of bile looking emesis. Pt up all night despite melatonin and Ambien administration. Bed is low and locked. Call light within reach. Will continue to monitor.

## 2020-02-09 NOTE — PROGRESS NOTES
Pt alert but confused to place and self. Patient keeps talking about workers in his room although there are no other people in his room. Pt is being turned and repositioned Q2H. Patient was bathed and received a total linen change this AM before breakfast. Nurse looked over all wounds and applied venlex to promote healing. Pt has bilateral expiratory and inspiratory wheezes. VSS. Patient is calm in bed eating breakfast.     Patient denies any other needs at this time. Bed is in the lowest position, call light and bedside table within reach. Patient bed alarm is on. Will continue to monitor for changes in patient status.      Electronically signed by Lauralee Holstein, RN on 2/9/2020 at 10:29 AM

## 2020-02-09 NOTE — PLAN OF CARE
Problem: Risk for Impaired Skin Integrity  Goal: Tissue integrity - skin and mucous membranes  Description  Structural intactness and normal physiological function of skin and  mucous membranes. Note:   Patient still shows signs of bruising on upper extremities and generalized edema in lower extremities. All wounds were covered with venelex ointment to promote healing. Patient was repositioned and made comfortable with pillows between legs to promote decrease in skin breakdown. RN will make sure patient is turned Q2hr     Problem: Confusion - Acute:  Goal: Mental status will be restored to baseline  Description  Mental status will be restored to baseline  Note:   Patient alert but is confused to place and self. Patient continues to talk about the workers in room although there is no other person in the room. RN continues to reorients patient to place and self.

## 2020-02-09 NOTE — PROGRESS NOTES
Progress Note    Admit Date: 2/1/2020  Day: 8  Diet: DIET CARDIAC; Low Potassium    CC: Urinary retention    Interval history: Patient is more awake and his mentation appears improved this AM. He says that he feels week and had one episode of vomiting overnight. MRSA nasal probe returned positive. VSS and leukocytosis is resolved (WBC 8.3). He denies chest pain, SOB, abdominal pain, constipation and diarrhea. Medications:     Scheduled Meds:   aspirin  81 mg Oral Daily    gabapentin  300 mg Oral TID    cyanocobalamin  1,000 mcg Intramuscular Daily    linezolid  600 mg Intravenous Q12H    sodium bicarbonate  650 mg Oral BID    polyethylene glycol  17 g Oral Daily    senna  2 tablet Oral Daily    Venelex   Topical BID    levothyroxine  50 mcg Oral Daily    meropenem  1 g Intravenous Q8H    heparin (porcine)  5,000 Units Subcutaneous 3 times per day    tamsulosin  0.4 mg Oral Daily    ferrous sulfate  325 mg Oral Daily with breakfast    atorvastatin  80 mg Oral Nightly    isosorbide mononitrate  30 mg Oral Daily    famotidine  20 mg Oral BID    sodium chloride flush  10 mL Intravenous 2 times per day     Continuous Infusions:  PRN Meds:melatonin, zolpidem, niacin, sodium chloride flush, magnesium hydroxide, ondansetron, acetaminophen    Objective:   Vitals:   T-max:  Patient Vitals for the past 8 hrs:   BP Temp Temp src Pulse Resp SpO2 Weight   02/09/20 0537 -- -- -- -- -- -- 215 lb 8 oz (97.8 kg)   02/09/20 0424 131/64 97 °F (36.1 °C) Oral 84 20 91 % --       Intake/Output Summary (Last 24 hours) at 2/9/2020 0856  Last data filed at 2/9/2020 0424  Gross per 24 hour   Intake 130 ml   Output 3200 ml   Net -3070 ml     Review of Systems   Constitutional: Negative for fatigue and fever. HENT: Negative for ear pain and sinus pain. Eyes: Negative for pain. Respiratory: Negative for shortness of breath. Cardiovascular: Positive for leg swelling. Negative for chest pain.    Gastrointestinal: Positive for vomiting. Negative for abdominal pain, constipation and diarrhea. Genitourinary: Negative for flank pain. Musculoskeletal: Negative for back pain and neck pain. Neurological: Positive for weakness. Negative for headaches. Psychiatric/Behavioral: Negative for agitation, behavioral problems and confusion. Physical Exam  Constitutional:       General: He is awake. He is not in acute distress. Appearance: Normal appearance. HENT:      Head: Normocephalic and atraumatic. Nose: Nose normal.   Eyes:      General: Vision grossly intact. Gaze aligned appropriately. Right eye: No discharge. Left eye: No discharge. Extraocular Movements: Extraocular movements intact. Conjunctiva/sclera: Conjunctivae normal.   Neck:      Musculoskeletal: Full passive range of motion without pain, normal range of motion and neck supple. Cardiovascular:      Rate and Rhythm: Normal rate and regular rhythm. Pulses: Normal pulses. Heart sounds: Murmur present. Pulmonary:      Effort: Pulmonary effort is normal.      Breath sounds: Normal breath sounds. Abdominal:      General: There is no distension. There are no signs of injury. Palpations: Abdomen is soft. Tenderness: There is no abdominal tenderness. Musculoskeletal: Normal range of motion. General: Swelling present. No deformity or signs of injury. Skin:     General: Skin is warm. Findings: Bruising present. Neurological:      General: No focal deficit present. Mental Status: He is alert, oriented to person, place, and time and easily aroused. Mental status is at baseline. Motor: Motor function is intact. Coordination: Coordination is intact. Gait: Gait is intact.    Psychiatric:         Attention and Perception: Attention and perception normal.         Mood and Affect: Mood and affect normal.         Speech: Speech normal.         Behavior: Behavior normal. Behavior is cooperative. Thought Content: Thought content normal.         Cognition and Memory: Cognition normal.         Judgment: Judgment normal.       LABS:    CBC:   Recent Labs     02/07/20  0540 02/08/20  0439 02/09/20  0523   WBC 11.7* 10.0 8.3   HGB 9.5* 10.6* 9.5*   HCT 29.0* 32.3* 28.4*    158 157   MCV 98.8 98.9 96.8     Renal:    Recent Labs     02/07/20  0539 02/08/20  0439 02/09/20  0522    139 139   K 5.4* 4.9 5.1    105 105   CO2 22 25 22   BUN 24* 21* 21*   CREATININE 0.8 0.7* 1.0   GLUCOSE 111* 77 111*   CALCIUM 8.7 9.0 8.7   ANIONGAP 10 9 12     Hepatic: No results for input(s): AST, ALT, BILITOT, BILIDIR, PROT, LABALBU, ALKPHOS in the last 72 hours. Troponin: No results for input(s): TROPONINI in the last 72 hours. BNP: No results for input(s): BNP in the last 72 hours. Lipids: No results for input(s): CHOL, HDL in the last 72 hours. Invalid input(s): LDLCALCU, TRIGLYCERIDE  ABGs:    Recent Labs     02/07/20  0945   PHART 7.352   ZNK5NXS 44.4   PO2ART 95.3   BHF0XQD 24   BEART -1.0   [id number] 98   PTK6RLE 25       INR: No results for input(s): INR in the last 72 hours. Lactate: No results for input(s): LACTATE in the last 72 hours. Cultures:  -----------------------------------------------------------------  RAD:   XR CHEST PORTABLE   Final Result   Cardiomegaly. Small left pleural effusion. Diffuse airspace opacities in    both lungs with interstitial prominence appears slightly improved and    could represent improving pulmonary edema or pneumonia. CT HEAD WO CONTRAST   Final Result      No evidence of acute intracranial abnormality. XR CHEST PORTABLE   Final Result      Perihilar consolidation bilaterally compatible with atypical pneumonia or pulmonary edema. Cardiomegaly status post sternotomy. Silhouetting the left hemidiaphragm may be due to left lower lobe consolidation or portable technique.  PA and lateral chest x-ray would be helpful for further evaluation. VL Extremity Venous Right   Final Result      XR CHEST PORTABLE   Final Result      Unchanged small left pleural effusion and left basilar atelectasis versus pneumonia. XR CHEST STANDARD (2 VW)   Final Result      Left persistent pleural-based consolidation and pleural effusion. Slightly increased compared to prior study      Prior sternotomy. Right lung remains clear. Assessment/Plan:     77M w/ PMH CAD s/p CABG, BPH, GERD, back pain, A-fib, CKD, venous stasis B/L LE wounds, and HLD presented on 02/01/20 w/ urinary retention after being unable to replace schneider. 1. Complicated UTI 2/2 chronic indwelling catheter  - Hx of multiple UTIs 2/2 chronic schneider (MDRO, carbapenem/zosyn sensitive). - UA (02/01/20): Leukocyte (+) / Nitrite (+). WBC . Pseudomonas (+). Meropenem-sensitive. - UA (02/05/20): Culture growing Candida.  - Plan: Meropenem 1 g IV q8h. Linezolid 600 mg IV BID. Deescalate when possible. 2. PNA  - CXR (02/07/20): Perihilar consolidation bilaterally. LLL consolidation.  - CXR (02/08/20): Small left pleural effusion. Diffuse interstitial opacities bilaterally. - MRSA nasal probe (+) (02/07/20). - Pro-bill elevated 0.82 (02/07/20). - Plan: Meropenem 1 g IV q8h. Linezolid 600 mg IV BID. Deescalate when possible. 3. Somnolence  - Etiology unclear. Suspect mediation-associated (baclofen, gabapentin). - Baclofen pump output decreased to minimum dose on 02/07/20.  - Plan: Lower sedating medications as tolerated. 4. Urinary retention  - Cysto (01/2019): Likely neurogenic bladder. Small prostate.  - 3.2 L UOP last 24 hours w/ catheter in place.  - Plan: Tamsulosin 0.4 mg QD.     5. Neuromuscular spasticity  - Associated w/ functional quadriplegia   - Plan: Intrathecal baclofen pump (abdomen). 6. Vitamin B12 deficiency  - Associated w/ anemia, neuropathy. - Vit B12 212 (05/02/15) > 184 (02/07/20).    - HgB 9.5 > 10.6 > 9.5.   - TIBC low (216, 02/06) but other iron studies WNL (w/ iron supplementation). - Plan: Vit B12 1000 mcg QD. Continue Iron 325 mg QD. Gabapentin 300 mg TID for neuropathy. 7. Hypothyroidism  - TSH elevated (5.03) w/ low T4 (0.8) (02/05). - Plan: Continue synthroid 50 mcg QD.    8. HTN  - BP controlled. 131/64 this AM.  - Imdur 30 mg QD.     9. HLD  - Atorvastatin 80 mg qPM.     10. Sacral wound  - Venelex ointment. Repositioning. 11. GERD  - Famotidine 20 mg BID.      12. Insomnia  - Melatonin 3 mg qPM.  - Ambien 5 mg qPM.     13. Constipation  - PEG 17 g QD.   - Senna 17.2 mg QD. 14. CAD s/p CABG  - Continue ASA 81 mg QD. 15. PPX  - Heparin. 16. Functional quadriplegia 2/2 Neuromuscular disorder:  - PT/OT    Code Status: Full code  FEN: Cardiac  DISPO: Floor    Irisjessica Ramesh DO, PGY-1  02/09/20  8:56 AM    This patient has been staffed and discussed with Rodger Martines MD.       Patient seen and examined, plan of care discussed with residents. Agree with their assessment and plan with following addendum:  Mentation is much improved, actually more interactive compared to day 1 of admission. Cont with current antibiotics. MRSA nares positive. Will follow up with ID on discharge antibiotics. PT/OT. May need to increase baclofen rate if becomes spastic.        Rodger Martines

## 2020-02-10 LAB
ANION GAP SERPL CALCULATED.3IONS-SCNC: 13 MMOL/L (ref 3–16)
BASOPHILS ABSOLUTE: 0 K/UL (ref 0–0.2)
BASOPHILS RELATIVE PERCENT: 0.6 %
BUN BLDV-MCNC: 22 MG/DL (ref 7–20)
CALCIUM SERPL-MCNC: 9.1 MG/DL (ref 8.3–10.6)
CHLORIDE BLD-SCNC: 100 MMOL/L (ref 99–110)
CO2: 23 MMOL/L (ref 21–32)
CREAT SERPL-MCNC: 1.1 MG/DL (ref 0.8–1.3)
EOSINOPHILS ABSOLUTE: 0.1 K/UL (ref 0–0.6)
EOSINOPHILS RELATIVE PERCENT: 0.8 %
GFR AFRICAN AMERICAN: >60
GFR NON-AFRICAN AMERICAN: >60
GLUCOSE BLD-MCNC: 114 MG/DL (ref 70–99)
HCT VFR BLD CALC: 30.3 % (ref 40.5–52.5)
HEMOGLOBIN: 9.9 G/DL (ref 13.5–17.5)
LYMPHOCYTES ABSOLUTE: 1.2 K/UL (ref 1–5.1)
LYMPHOCYTES RELATIVE PERCENT: 16.3 %
MCH RBC QN AUTO: 31.9 PG (ref 26–34)
MCHC RBC AUTO-ENTMCNC: 32.7 G/DL (ref 31–36)
MCV RBC AUTO: 97.5 FL (ref 80–100)
MONOCYTES ABSOLUTE: 0.6 K/UL (ref 0–1.3)
MONOCYTES RELATIVE PERCENT: 7.8 %
NEUTROPHILS ABSOLUTE: 5.5 K/UL (ref 1.7–7.7)
NEUTROPHILS RELATIVE PERCENT: 74.5 %
PDW BLD-RTO: 15 % (ref 12.4–15.4)
PLATELET # BLD: 155 K/UL (ref 135–450)
PMV BLD AUTO: 8.6 FL (ref 5–10.5)
POTASSIUM REFLEX MAGNESIUM: 5 MMOL/L (ref 3.5–5.1)
RBC # BLD: 3.1 M/UL (ref 4.2–5.9)
SODIUM BLD-SCNC: 136 MMOL/L (ref 136–145)
WBC # BLD: 7.3 K/UL (ref 4–11)

## 2020-02-10 PROCEDURE — 2580000003 HC RX 258: Performed by: STUDENT IN AN ORGANIZED HEALTH CARE EDUCATION/TRAINING PROGRAM

## 2020-02-10 PROCEDURE — 85025 COMPLETE CBC W/AUTO DIFF WBC: CPT

## 2020-02-10 PROCEDURE — 1200000000 HC SEMI PRIVATE

## 2020-02-10 PROCEDURE — 6370000000 HC RX 637 (ALT 250 FOR IP): Performed by: INTERNAL MEDICINE

## 2020-02-10 PROCEDURE — 6370000000 HC RX 637 (ALT 250 FOR IP): Performed by: STUDENT IN AN ORGANIZED HEALTH CARE EDUCATION/TRAINING PROGRAM

## 2020-02-10 PROCEDURE — 6360000002 HC RX W HCPCS: Performed by: STUDENT IN AN ORGANIZED HEALTH CARE EDUCATION/TRAINING PROGRAM

## 2020-02-10 PROCEDURE — 6370000000 HC RX 637 (ALT 250 FOR IP): Performed by: UROLOGY

## 2020-02-10 PROCEDURE — 6360000002 HC RX W HCPCS: Performed by: INTERNAL MEDICINE

## 2020-02-10 PROCEDURE — 36415 COLL VENOUS BLD VENIPUNCTURE: CPT

## 2020-02-10 PROCEDURE — 80048 BASIC METABOLIC PNL TOTAL CA: CPT

## 2020-02-10 RX ORDER — UREA 10 %
6 LOTION (ML) TOPICAL NIGHTLY PRN
Status: DISCONTINUED | OUTPATIENT
Start: 2020-02-10 | End: 2020-02-10

## 2020-02-10 RX ORDER — ZOLPIDEM TARTRATE 5 MG/1
10 TABLET ORAL NIGHTLY
Status: DISCONTINUED | OUTPATIENT
Start: 2020-02-10 | End: 2020-02-13 | Stop reason: HOSPADM

## 2020-02-10 RX ORDER — ZOLPIDEM TARTRATE 5 MG/1
10 TABLET ORAL NIGHTLY PRN
Status: DISCONTINUED | OUTPATIENT
Start: 2020-02-10 | End: 2020-02-10

## 2020-02-10 RX ORDER — UREA 10 %
6 LOTION (ML) TOPICAL NIGHTLY
Status: DISCONTINUED | OUTPATIENT
Start: 2020-02-10 | End: 2020-02-13 | Stop reason: HOSPADM

## 2020-02-10 RX ADMIN — ZOLPIDEM TARTRATE 10 MG: 5 TABLET ORAL at 19:36

## 2020-02-10 RX ADMIN — FERROUS SULFATE TAB 325 MG (65 MG ELEMENTAL FE) 325 MG: 325 (65 FE) TAB at 08:54

## 2020-02-10 RX ADMIN — TAMSULOSIN HYDROCHLORIDE 0.4 MG: 0.4 CAPSULE ORAL at 08:44

## 2020-02-10 RX ADMIN — LINEZOLID 600 MG: 600 INJECTION, SOLUTION INTRAVENOUS at 17:22

## 2020-02-10 RX ADMIN — HEPARIN SODIUM 5000 UNITS: 5000 INJECTION INTRAVENOUS; SUBCUTANEOUS at 14:38

## 2020-02-10 RX ADMIN — GABAPENTIN 300 MG: 600 TABLET, FILM COATED ORAL at 14:38

## 2020-02-10 RX ADMIN — CASTOR OIL AND BALSAM, PERU: 788; 87 OINTMENT TOPICAL at 19:36

## 2020-02-10 RX ADMIN — CASTOR OIL AND BALSAM, PERU: 788; 87 OINTMENT TOPICAL at 08:56

## 2020-02-10 RX ADMIN — MEROPENEM 1 G: 1 INJECTION, POWDER, FOR SOLUTION INTRAVENOUS at 14:37

## 2020-02-10 RX ADMIN — FAMOTIDINE 20 MG: 20 TABLET ORAL at 19:35

## 2020-02-10 RX ADMIN — LINEZOLID 600 MG: 600 INJECTION, SOLUTION INTRAVENOUS at 05:32

## 2020-02-10 RX ADMIN — FAMOTIDINE 20 MG: 20 TABLET ORAL at 08:44

## 2020-02-10 RX ADMIN — SODIUM BICARBONATE 650 MG TABLET 650 MG: at 08:44

## 2020-02-10 RX ADMIN — SENNOSIDES 17.2 MG: 8.6 TABLET, FILM COATED ORAL at 08:44

## 2020-02-10 RX ADMIN — SODIUM BICARBONATE 650 MG TABLET 650 MG: at 19:35

## 2020-02-10 RX ADMIN — CYANOCOBALAMIN 1000 MCG: 1000 INJECTION, SOLUTION INTRAMUSCULAR; SUBCUTANEOUS at 08:46

## 2020-02-10 RX ADMIN — MEROPENEM 1 G: 1 INJECTION, POWDER, FOR SOLUTION INTRAVENOUS at 08:46

## 2020-02-10 RX ADMIN — POLYETHYLENE GLYCOL 3350 17 G: 17 POWDER, FOR SOLUTION ORAL at 08:49

## 2020-02-10 RX ADMIN — GABAPENTIN 300 MG: 600 TABLET, FILM COATED ORAL at 19:36

## 2020-02-10 RX ADMIN — MEROPENEM 1 G: 1 INJECTION, POWDER, FOR SOLUTION INTRAVENOUS at 22:35

## 2020-02-10 RX ADMIN — GABAPENTIN 300 MG: 600 TABLET, FILM COATED ORAL at 08:43

## 2020-02-10 RX ADMIN — ATORVASTATIN CALCIUM 80 MG: 80 TABLET, FILM COATED ORAL at 19:35

## 2020-02-10 RX ADMIN — ISOSORBIDE MONONITRATE 30 MG: 30 TABLET, EXTENDED RELEASE ORAL at 08:45

## 2020-02-10 RX ADMIN — Medication 10 ML: at 19:36

## 2020-02-10 RX ADMIN — HEPARIN SODIUM 5000 UNITS: 5000 INJECTION INTRAVENOUS; SUBCUTANEOUS at 05:32

## 2020-02-10 RX ADMIN — LEVOTHYROXINE SODIUM 50 MCG: 0.05 TABLET ORAL at 05:32

## 2020-02-10 RX ADMIN — Medication 10 ML: at 08:47

## 2020-02-10 RX ADMIN — ASPIRIN 81 MG 81 MG: 81 TABLET ORAL at 08:45

## 2020-02-10 RX ADMIN — HEPARIN SODIUM 5000 UNITS: 5000 INJECTION INTRAVENOUS; SUBCUTANEOUS at 22:35

## 2020-02-10 RX ADMIN — Medication 6 MG: at 19:35

## 2020-02-10 ASSESSMENT — ENCOUNTER SYMPTOMS
DIARRHEA: 0
CONSTIPATION: 0
ABDOMINAL PAIN: 0
SHORTNESS OF BREATH: 0
BACK PAIN: 0
VOMITING: 0
SINUS PAIN: 0
EYE PAIN: 0

## 2020-02-10 ASSESSMENT — PAIN SCALES - GENERAL
PAINLEVEL_OUTOF10: 0

## 2020-02-10 ASSESSMENT — VISUAL ACUITY: OU: 1

## 2020-02-10 NOTE — PLAN OF CARE
Problem: Falls - Risk of:  Goal: Will remain free from falls  Description  Will remain free from falls  Outcome: Ongoing   Pt has been absent of falls. Fall precautions in place. Bed is low and locked. 2/4 side rails in place. Nonskid socks on. Bed alarm on. Avasys in place, pt has tried to get OOB. Call light and bedside table within reach. Will continue to monitor. Problem: Urinary Elimination:  Goal: Signs and symptoms of infection will decrease  Description  Signs and symptoms of infection will decrease  Outcome: Ongoing   Pt has a chronic schneider d/t urinary retention and UTI. Pt is on IV ABX. Will continue to monitor. Problem: Confusion - Acute:  Goal: Absence of continued neurological deterioration signs and symptoms  Description  Absence of continued neurological deterioration signs and symptoms  Outcome: Ongoing   Pt is alert to self. Pt is having conversations with himself and seeing things that are not there. Will continue to monitor.

## 2020-02-10 NOTE — PROGRESS NOTES
Nephrology Consult Note                                                                                                                                                                                                                                                                                                                              Patient's Name: Mirella Webbtery    K better   Bicarb better   Good UO   Iglesias in place     Past Medical History:   Diagnosis Date    BPH (benign prostatic hyperplasia)     Carotid stenosis 12/2013    JESU 86-75% stenosis; LICA 95-44% stenosis    Cellulitis 12/2013    LLE    Chronic back pain     Diverticular disease     Erectile dysfunction     GERD (gastroesophageal reflux disease)     History of atrial fibrillation     Hypercholesteremia     Hypertension     Lower GI bleed     MDRO (multiple drug resistant organisms) resistance 10/26/2019    urine    Neuromuscular disorder (HCC)     spasticity    Renal insufficiency     Risk for falls     uses walker    Tinea corporis 12/2013    LLE    Vitamin B12 deficiency        Past Surgical History:   Procedure Laterality Date    BACK SURGERY  2006    lower lumbar    CORONARY ARTERY BYPASS GRAFT  12/13/2013    CABG x 5 (Dr Alo Pinto)    CYSTOSCOPY N/A 1/10/2019    CYSTOSCOPY performed by César Valderrama MD at [facility] 169 Right 5/1/2015    [facility]       Family History   Problem Relation Age of Onset    Heart Disease Father         reports that he quit smoking about 50 years ago. He has never used smokeless tobacco. He reports that he does not drink alcohol or use drugs.     Allergies:  Bactrim [sulfamethoxazole-trimethoprim]    Current Medications:    aspirin chewable tablet 81 mg, Daily  gabapentin (NEURONTIN) tablet 300 mg, TID  cyanocobalamin injection 1,000 mcg, Daily  linezolid (ZYVOX) IVPB 600 mg, Q12H  melatonin tablet 3 mg, Nightly PRN  zolpidem (AMBIEN) tablet 5 mg, Nightly PRN  sodium bicarbonate tablet 650 mg, BID  polyethylene glycol (GLYCOLAX) packet 17 g, Daily  senna (SENOKOT) tablet 17.2 mg, Daily  Venelex ointment, BID  levothyroxine (SYNTHROID) tablet 50 mcg, Daily  meropenem (MERREM) 1 g in sodium chloride 0.9 % 100 mL IVPB (mini-bag), Q8H  heparin (porcine) injection 5,000 Units, 3 times per day  tamsulosin (FLOMAX) capsule 0.4 mg, Daily  ferrous sulfate tablet 325 mg, Daily with breakfast  atorvastatin (LIPITOR) tablet 80 mg, Nightly  isosorbide mononitrate (IMDUR) extended release tablet 30 mg, Daily  niacin (NIASPAN) extended release tablet 500 mg, Nightly PRN  famotidine (PEPCID) tablet 20 mg, BID  sodium chloride flush 0.9 % injection 10 mL, 2 times per day  sodium chloride flush 0.9 % injection 10 mL, PRN  magnesium hydroxide (MILK OF MAGNESIA) 400 MG/5ML suspension 30 mL, Daily PRN  ondansetron (ZOFRAN) injection 4 mg, Q6H PRN  acetaminophen (TYLENOL) tablet 650 mg, Q4H PRN        Review of Systems:   14 point ROS obtained but were negative except mentioned in HPI      Physical exam:     Vitals:  BP (!) 148/78   Pulse 81   Temp 98.9 °F (37.2 °C) (Oral)   Resp 18   Ht 5' 11\" (1.803 m)   Wt 207 lb 3.2 oz (94 kg)   SpO2 95%   BMI 28.90 kg/m²   Constitutional:  OAA X2 (self, place), NAD  Skin: noted bilat leg chronic skin venous changes, multiple eczematous spots  Neck: no jvd noted, limited ROM  Cardiovascular:  S1, S2 no r/g, 2/6 holosystolic murmur in LUSB  Respiratory: CTA B without w/r/r  Abdomen:  +bs, soft, nt, nd  Ext: chornic lower extremity edema up to knees  Psychiatric: mood and affect appropriate, occasional tangent speech. Musculoskeletal:  Limited spastic ROM, reduced muscle bulk.     Data:   Labs:  CBC:   Recent Labs     02/08/20  0439 02/09/20  0523 02/10/20  0436   WBC 10.0 8.3 7.3   HGB 10.6* 9.5* 9.9*    157 155     BMP:    Recent Labs     02/08/20  0439 02/09/20  0522 02/10/20  0436    139 136   K 4.9 5.1 5.0    105 100   CO2 25 22 23 BUN 21* 21* 22*   CREATININE 0.7* 1.0 1.1   GLUCOSE 77 111* 114*     Ca/Mg/Phos:   Recent Labs     02/08/20  0439 02/09/20  0522 02/10/20  0436   CALCIUM 9.0 8.7 9.1   Adjusted Ca+2 today to 8.7 g/dl    Hepatic:   No results for input(s): AST, ALT, ALB, BILITOT, ALKPHOS in the last 72 hours. Troponin: No results for input(s): TROPONINI in the last 72 hours. BNP: No results for input(s): BNP in the last 72 hours. Lipids: No results for input(s): CHOL, TRIG, HDL, LDLCALC, LABVLDL in the last 72 hours. ABGs:   No results for input(s): PHART, PO2ART, PGL7BNI in the last 72 hours. INR: No results for input(s): INR in the last 72 hours. UA:  No results for input(s): Reggie Ding, GLUCOSEU, BILIRUBINUR, KETUA, SPECGRAV, BLOODU, PHUR, PROTEINU, UROBILINOGEN, NITRU, LEUKOCYTESUR, Jocelynn Laity in the last 72 hours. Urine Microscopic:   No results for input(s): LABCAST, BACTERIA, COMU, HYALCAST, WBCUA, RBCUA, EPIU in the last 72 hours. Urine Culture:   No results for input(s): LABURIN in the last 72 hours. Urine Chemistry:   No results for input(s): Perez Kinds, PROTEINUR, NAUR in the last 72 hours. IMAGING:  XR CHEST PORTABLE   Final Result   Cardiomegaly. Small left pleural effusion. Diffuse airspace opacities in    both lungs with interstitial prominence appears slightly improved and    could represent improving pulmonary edema or pneumonia. CT HEAD WO CONTRAST   Final Result      No evidence of acute intracranial abnormality. XR CHEST PORTABLE   Final Result      Perihilar consolidation bilaterally compatible with atypical pneumonia or pulmonary edema. Cardiomegaly status post sternotomy. Silhouetting the left hemidiaphragm may be due to left lower lobe consolidation or portable technique. PA and lateral chest x-ray would be helpful for further evaluation.       VL Extremity Venous Right   Final Result      XR CHEST PORTABLE   Final Result      Unchanged small left pleural effusion and left basilar atelectasis versus pneumonia. XR CHEST STANDARD (2 VW)   Final Result      Left persistent pleural-based consolidation and pleural effusion. Slightly increased compared to prior study      Prior sternotomy. Right lung remains clear. Assessment/Plan   1. ALIS:     2. Hyperkalemia:  RTA 4     3. Acid- base/ Electrolyte imbalance: RTA 4      4. Normocytic anemia:     5.  HTN:     PLAN:  - Changed to oral bicarb   - Low K diet   - Monitor K   - Cr stable   - abx   - cont schneider     Thank you for allowing us to participate in care of Martina Leon MD

## 2020-02-10 NOTE — DISCHARGE INSTR - COC
Continuity of Care Form    Patient Name: Raj Harris   :  1940  MRN:  [id number]    Admit date:  2020  Discharge date:  20    Code Status Order: Full Code   Advance Directives:   [facility] Documentation     Date/Time Healthcare Directive Type of Healthcare Directive Copy in [address] Agent's Name Healthcare Agent's Phone Number    20 0000  Yes, patient has an advance directive for healthcare treatment  Durable power of  for health care;Living will  Yes, copy in chart -- --  --          Admitting Physician:  Cris Machado MD  PCP: Abundio Chilel    Discharging Nurse: [facility] Unit/Room#: 2313/8617-24  Discharging Unit Phone Number: [phone]    Emergency Contact:   Extended Emergency Contact Information  Primary Emergency Contact: [place] [address] Phone: [phone]  Work Phone: [phone]  Mobile Phone: [phone]  Relation: Child  Secondary Emergency Contact: Dana Callahan  Address: [place]  Home Phone: [phone]  Relation: Other    Past Surgical History:  Past Surgical History:   Procedure Laterality Date    BACK SURGERY  2006    lower lumbar    CORONARY ARTERY BYPASS GRAFT  2013    CABG x 5 (Dr Genesis Mendoza)   [facility] N/A 1/10/2019    CYSTOSCOPY performed by Melinda Garcia MD at [place] [address] Right 2015    [facility]       Immunization History:   Immunization History   Administered Date(s) Administered    Influenza Vaccine, unspecified formulation 2012, 2014, 10/13/2015, 2016, 2017, 10/26/2018, 2019    Influenza Virus Vaccine 2013    Influenza Whole 10/01/2007, 2010, 2011, 2012, 10/13/2015    Pneumococcal Conjugate 13-valent ([id number]) 2014    Pneumococcal Polysaccharide ([id number]) 2014       Active Problems:  Patient Active Problem List   Diagnosis Code    Hypotension I95.9    Light headed R42    Cellulitis L03.90    Essential hypertension I10    GERD (gastroesophageal reflux disease) K21.9    Acute blood loss anemia D62    Tinea corporis B35.4    Carotid stenosis I65.29    CAD (coronary artery disease) I25.10    S/P CABG x 5 Z95.1    Lower GI bleeding K92.2    Hip fracture, right ([facility]) S72.001A    Acute right hip pain M25.551    Acute low back pain without sciatica M54.5    Hypothermia T68. Iveth Ropes    Complicated UTI (urinary tract infection) N39.0    ALIS (acute kidney injury) ([address] N17.9    Edema R60.9    Problem with urinary catheter ([facility] T83. 9XXA    Acute encephalopathy G93.40    Chronic indwelling Iglesias catheter Z96.0    Hyperlipidemia E78.5    Bilateral lower leg cellulitis L03.116, L03.115    Neurogenic bladder N31.9    Bacteriuria R82.71    Abnormality of urethral meatus Q64.70    Gross hematuria R31.0    CHF with unknown LVEF ([facility]) I50.9    Dyspnea R06.00    Bradycardia R00.1    UTI (urinary tract infection) N39.0    Pseudomonas infection A49.8       Isolation/Infection:   Isolation          No Isolation        Patient Infection Status     Infection Onset Added Last Indicated Last Indicated By Review Planned Expiration Resolved Resolved By    MRSA 02/07/20 02/08/20 02/07/20 MRSA DNA Probe, Nasal        Resolved    MDRO (multi-drug resistant organism) 10/26/19 10/28/19 11/19/19 Urine culture   01/16/20 Chantell Mcbride RN          Nurse Assessment:  Last Vital Signs: BP (!) 148/78   Pulse 81   Temp 98.9 °F (37.2 °C) (Oral)   Resp 18   Ht 5' 11\" (1.803 m)   Wt 207 lb 3.2 oz (94 kg)   SpO2 95%   BMI 28.90 kg/m²     Last documented pain score (0-10 scale): Pain Level: 0  Last Weight:   Wt Readings from Last 1 Encounters:   02/10/20 207 lb 3.2 oz (94 kg)     Mental Status:  alert and able to concentrate and follow conversation    IV Access:  - None    Nursing Mobility/ADLs:  Walking   Dependent  Transfer  Dependent  Bathing  Dependent  Dressing Dependent  Toileting  Dependent  Feeding  Assisted  Med Admin  Assisted  Med Delivery   whole    Wound Care Documentation and Therapy:  Wound 11/19/19 Knee Anterior;Right dry thick intact scab (Active)   Wound Other 2/3/2020 11:40 AM   Dressing/Treatment Open to air 2/7/2020  3:23 AM   Wound Assessment Dry; Intact; Levonia Arbour 1/18/2020  3:41 PM   Drainage Amount None 1/19/2020  3:22 PM   Odor None 1/19/2020  3:22 PM   Number of days: 82       Wound 12/18/19 Sacrum Left -buttock - IAD/MASD partial thick slight denuded inside scarring (Active)   Wound Other 2/5/2020  5:09 PM   Dressing Status Old drainage; Other (Comment) 2/7/2020  3:23 AM   Dressing Changed Changed/New 2/5/2020  3:13 AM   Dressing/Treatment Open to air 2/10/2020  7:33 AM   Wound Cleansed Soap and water 2/4/2020  7:48 AM   Dressing Change Due 02/04/20 2/4/2020  5:44 PM   Wound Assessment Pink;Red 2/10/2020  7:33 AM   Drainage Amount Scant 2/7/2020  3:23 AM   Drainage Description Serosanguinous 2/7/2020  3:23 AM   Odor None 2/7/2020  3:23 AM   Margins Undefined edges 2/5/2020  5:09 PM   Usha-wound Assessment Yellow-brown;Hyperpigmented 2/7/2020  3:23 AM   Non-staged Wound Description Partial thickness 2/10/2020  7:33 AM   Number of days: 53       Wound 02/08/20 Sacrum Mid Moisture related dermatitis (Active)   Dressing Changed Changed/New 2/10/2020  7:33 AM   Dressing/Treatment Other (comment) 2/10/2020  7:33 AM   Number of days: 1        Elimination:  Continence:   · Bowel: No  · Bladder: Yes  Urinary Catheter: Last Change Date ***   Colostomy/Ileostomy/Ileal Conduit: No       Date of Last BM: 2/13/20    Intake/Output Summary (Last 24 hours) at 2/10/2020 0944  Last data filed at 2/10/2020 0827  Gross per 24 hour   Intake 1000 ml   Output 1600 ml   Net -600 ml     I/O last 3 completed shifts: In: 800 [P.O.:650; I.V.:150]  Out: 1600 [Urine:1600]    Safety Concerns:      At Risk for Falls    Impairments/Disabilities:      None    Nutrition Therapy:  Current Nutrition Therapy:   - Oral Diet:  Cardiac    Routes of Feeding: Oral  Liquids: No Restrictions  Daily Fluid Restriction: no  Last Modified Barium Swallow with Video (Video Swallowing Test): not done    Treatments at the Time of Hospital Discharge:   Respiratory Treatments:   Oxygen Therapy:  is not on home oxygen therapy. Ventilator:    - No ventilator support    Rehab Therapies:   Weight Bearing Status/Restrictions: No weight bearing restirctions  Other Medical Equipment (for information only, NOT a DME order): Other Treatments:     Patient's personal belongings (please select all that are sent with patient):  None    RN SIGNATURE:  Electronically signed by Anurag Kenny RN on 2/13/20 at 4:44 PM    CASE MANAGEMENT/SOCIAL WORK SECTION    Inpatient Status Date: ***    Readmission Risk Assessment Score:  Readmission Risk              Risk of Unplanned Readmission:        38           Discharging to Facility/ [address] [place] [address]       Phone: [phone]       Fax: [phone]        ·     / signature: Electronically signed by Nima Salmon RN on 2/13/20 at 4:01 PM    PHYSICIAN SECTION    Prognosis: Good    Condition at Discharge: Stable    Rehab Potential (if transferring to Rehab): Good    Recommended Labs or Other Treatments After Discharge:   BMP in one week. Continue linezolid until 2/14. Pt will need to see PMNR provider to readjust baclofen pump, if needed. Dosage lower to half dosage prior to admission. Pt to f/u with Dr Asa Duong after discharge for schneider management; will set up voiding trial.     Physician Certification: I certify the above information and transfer of Josué Mark  is necessary for the continuing treatment of the diagnosis listed and that he requires [facility] for greater 30 days.      Update Admission H&P: No change in H&P    PHYSICIAN SIGNATURE:  Electronically signed by Christy Carias MD and Silvana Batista MD on 2/13/20 at 3:16 PM

## 2020-02-10 NOTE — PROGRESS NOTES
Progress Note    Admit Date: 2/1/2020  Day: 9  Diet: DIET CARDIAC; Low Potassium    CC: Urinary retention    Interval history: No overnight events. VSS w/ 1.6 L urine output in last 24 hours. Patient is oriented but appears to have hallucinations at times. Avoids eye contact. Irregular sleeping pattern per nursing. He denies chest pain, SOB, abdominal pain, nausea, vomiting, constipation and diarrhea. Medications:     Scheduled Meds:   aspirin  81 mg Oral Daily    gabapentin  300 mg Oral TID    cyanocobalamin  1,000 mcg Intramuscular Daily    linezolid  600 mg Intravenous Q12H    sodium bicarbonate  650 mg Oral BID    polyethylene glycol  17 g Oral Daily    senna  2 tablet Oral Daily    Venelex   Topical BID    levothyroxine  50 mcg Oral Daily    meropenem  1 g Intravenous Q8H    heparin (porcine)  5,000 Units Subcutaneous 3 times per day    tamsulosin  0.4 mg Oral Daily    ferrous sulfate  325 mg Oral Daily with breakfast    atorvastatin  80 mg Oral Nightly    isosorbide mononitrate  30 mg Oral Daily    famotidine  20 mg Oral BID    sodium chloride flush  10 mL Intravenous 2 times per day     Continuous Infusions:  PRN Meds:melatonin, zolpidem, niacin, sodium chloride flush, magnesium hydroxide, ondansetron, acetaminophen    Objective:   Vitals:   T-max:  Patient Vitals for the past 8 hrs:   BP Temp Temp src Pulse Resp SpO2 Weight   02/10/20 1145 138/82 98.2 °F (36.8 °C) Oral 78 18 95 % --   02/10/20 0732 (!) 148/78 98.9 °F (37.2 °C) Oral 81 18 95 % --   02/10/20 0530 -- -- -- -- -- -- 207 lb 3.2 oz (94 kg)       Intake/Output Summary (Last 24 hours) at 2/10/2020 1159  Last data filed at 2/10/2020 1147  Gross per 24 hour   Intake 1050 ml   Output 1850 ml   Net -800 ml     Review of Systems   Constitutional: Negative for fatigue and fever. HENT: Negative for ear pain and sinus pain. Eyes: Negative for pain. Respiratory: Negative for shortness of breath.     Cardiovascular: Positive for leg baclofen pump (abdomen).     7. Vitamin B12 deficiency  - Associated w/ anemia, neuropathy. - Vit B12 212 (05/02/15) > 184 (02/07/20). - HgB 10.6 > 9.5 > 9.9.   - TIBC low (216, 02/06) but other iron studies WNL (w/ iron supplementation). - Plan: Vit B12 1000 mcg QD. Continue Iron 325 mg QD. Gabapentin 300 mg TID for neuropathy. 8. ALIS  - Suspect RTA4.  - Cr 0.7 > 1.0 > 1.1.  - CO2 25 > 22 > 23. - K 4.9 > 5.1 > 5.0.  - Plan: Sodium bicarb 650 mg PO BID. Nephrology following. 9. Hypothyroidism  - TSH elevated (5.03) w/ low T4 (0.8) (02/05). - Plan: Continue synthroid 50 mcg QD.     10. HTN  - BP controlled. 129/65 this AM.  - Imdur 30 mg QD.     11. HLD  - Atorvastatin 80 mg qPM.     12. Sacral wound  - Venelex ointment. Repositioning.      13. GERD  - Famotidine 20 mg BID. 14. Constipation  - PEG 17 g QD.   - Senna 17.2 mg QD.     15. CAD s/p CABG  - Continue ASA 81 mg QD.     16. PPX  - Heparin.     Code Status: Full code  FEN: Cardiac  DISPO: Floor    IrisGreater [facility] Community Hospitallucila Ramesh, , PGY-1  02/10/20  11:59 AM    This patient has been staffed and discussed with Giancarlo Geller MD.

## 2020-02-10 NOTE — PLAN OF CARE
Problem: Falls - Risk of:  Goal: Will remain free from falls  Description  Will remain free from falls  2/10/2020 1007 by Gisela Grey RN  Note:   Patient was moved to chair and chair alarm is on. Patient's table over lap and call light in reach so patient does not try to get up. Video monitor placed in room over night because patient was confused and trying to get out of bed. RN will continue to monitor to maintain patient is free from falls or injury. 2/10/2020 0327 by Jodie Rodriguez RN  Outcome: Ongoing     Problem: Confusion - Acute:  Goal: Absence of continued neurological deterioration signs and symptoms  Description  Absence of continued neurological deterioration signs and symptoms  2/10/2020 1007 by Gisela Grey RN  Note:   Patient remains confused this morning. Patient continues to have hallucinations of people in room and continues to have conversations with them. Patient is oriented to self but not to time place or situation. RN will continue to monitor for changes   2/10/2020 0327 by Jodie Rodriguez RN  Outcome: Ongoing     Problem: Risk for Impaired Skin Integrity  Goal: Tissue integrity - skin and mucous membranes  Description  Structural intactness and normal physiological function of skin and  mucous membranes. Note:   Patient experienced skin tear on back of RUE. Patient bleeding through dressing and on to gown. Dressing and gown changed and dressing was reinforced with ACE bandage wrap. RN will continue to monitor for chnages.

## 2020-02-10 NOTE — PROGRESS NOTES
Pt is alert to self and having full conversations with no one in the room. Pt is seeing things that are not there. Avasys placed, pt tried to get of of bed. Pt has been pulling at lines and leaning over bed rails. Iglesias in place. IV ABX given per orders. VSS. Bed alarm on and call light within reach. Will continue to monitor.

## 2020-02-10 NOTE — PROGRESS NOTES
Occupational Therapy / Physical Therapy  No treatment   Attempted to see pt this pm for OT /PT tx. Pt found up in chair and alert. Pt declines all therapy attempts despite max encouragement. Pt reports \" I'm way too tired\" pt edu on importance of activity w/o success. Pt oriented to place / person/ time. Rn reported pt seeing people in his room earlier. Will follow up tomorrow as schedule allows.       Edmundo Lagunas  OTR/L  [facility].

## 2020-02-10 NOTE — CARE COORDINATION
CM following, pt agrees to go to [facility] for pt to DC when medically stable. Pt with increased confusion,  Not medically stable to DC.   Electronically signed by Baldemar Floyd RN on 2/10/2020 at 3:21 PM   [phone]

## 2020-02-11 LAB
ANION GAP SERPL CALCULATED.3IONS-SCNC: 14 MMOL/L (ref 3–16)
BASOPHILS ABSOLUTE: 0.1 K/UL (ref 0–0.2)
BASOPHILS RELATIVE PERCENT: 1 %
BLOOD CULTURE, ROUTINE: NORMAL
BUN BLDV-MCNC: 20 MG/DL (ref 7–20)
CALCIUM SERPL-MCNC: 8.5 MG/DL (ref 8.3–10.6)
CHLORIDE BLD-SCNC: 102 MMOL/L (ref 99–110)
CO2: 22 MMOL/L (ref 21–32)
CREAT SERPL-MCNC: 1 MG/DL (ref 0.8–1.3)
CULTURE, BLOOD 2: NORMAL
EOSINOPHILS ABSOLUTE: 0.1 K/UL (ref 0–0.6)
EOSINOPHILS RELATIVE PERCENT: 2.8 %
GFR AFRICAN AMERICAN: >60
GFR NON-AFRICAN AMERICAN: >60
GLUCOSE BLD-MCNC: 114 MG/DL (ref 70–99)
HCT VFR BLD CALC: 29.1 % (ref 40.5–52.5)
HEMOGLOBIN: 9.8 G/DL (ref 13.5–17.5)
LYMPHOCYTES ABSOLUTE: 0.9 K/UL (ref 1–5.1)
LYMPHOCYTES RELATIVE PERCENT: 16.6 %
MCH RBC QN AUTO: 32.3 PG (ref 26–34)
MCHC RBC AUTO-ENTMCNC: 33.7 G/DL (ref 31–36)
MCV RBC AUTO: 95.8 FL (ref 80–100)
MONOCYTES ABSOLUTE: 0.5 K/UL (ref 0–1.3)
MONOCYTES RELATIVE PERCENT: 9.1 %
NEUTROPHILS ABSOLUTE: 3.7 K/UL (ref 1.7–7.7)
NEUTROPHILS RELATIVE PERCENT: 70.5 %
PDW BLD-RTO: 15 % (ref 12.4–15.4)
PLATELET # BLD: 135 K/UL (ref 135–450)
PMV BLD AUTO: 8.5 FL (ref 5–10.5)
POTASSIUM REFLEX MAGNESIUM: 4.3 MMOL/L (ref 3.5–5.1)
RBC # BLD: 3.04 M/UL (ref 4.2–5.9)
SODIUM BLD-SCNC: 138 MMOL/L (ref 136–145)
WBC # BLD: 5.2 K/UL (ref 4–11)

## 2020-02-11 PROCEDURE — 6370000000 HC RX 637 (ALT 250 FOR IP): Performed by: STUDENT IN AN ORGANIZED HEALTH CARE EDUCATION/TRAINING PROGRAM

## 2020-02-11 PROCEDURE — 6360000002 HC RX W HCPCS: Performed by: INTERNAL MEDICINE

## 2020-02-11 PROCEDURE — 2580000003 HC RX 258: Performed by: STUDENT IN AN ORGANIZED HEALTH CARE EDUCATION/TRAINING PROGRAM

## 2020-02-11 PROCEDURE — 97535 SELF CARE MNGMENT TRAINING: CPT

## 2020-02-11 PROCEDURE — 6360000002 HC RX W HCPCS: Performed by: STUDENT IN AN ORGANIZED HEALTH CARE EDUCATION/TRAINING PROGRAM

## 2020-02-11 PROCEDURE — 36415 COLL VENOUS BLD VENIPUNCTURE: CPT

## 2020-02-11 PROCEDURE — 6370000000 HC RX 637 (ALT 250 FOR IP): Performed by: INTERNAL MEDICINE

## 2020-02-11 PROCEDURE — 1200000000 HC SEMI PRIVATE

## 2020-02-11 PROCEDURE — 6370000000 HC RX 637 (ALT 250 FOR IP): Performed by: UROLOGY

## 2020-02-11 PROCEDURE — 97530 THERAPEUTIC ACTIVITIES: CPT

## 2020-02-11 PROCEDURE — 85025 COMPLETE CBC W/AUTO DIFF WBC: CPT

## 2020-02-11 PROCEDURE — 80048 BASIC METABOLIC PNL TOTAL CA: CPT

## 2020-02-11 RX ORDER — BACLOFEN 10 MG/1
15 TABLET ORAL 3 TIMES DAILY
Status: DISCONTINUED | OUTPATIENT
Start: 2020-02-11 | End: 2020-02-12

## 2020-02-11 RX ADMIN — ATORVASTATIN CALCIUM 80 MG: 80 TABLET, FILM COATED ORAL at 19:56

## 2020-02-11 RX ADMIN — BACLOFEN 15 MG: 10 TABLET ORAL at 14:09

## 2020-02-11 RX ADMIN — ISOSORBIDE MONONITRATE 30 MG: 30 TABLET, EXTENDED RELEASE ORAL at 08:03

## 2020-02-11 RX ADMIN — HEPARIN SODIUM 5000 UNITS: 5000 INJECTION INTRAVENOUS; SUBCUTANEOUS at 22:57

## 2020-02-11 RX ADMIN — SODIUM BICARBONATE 650 MG TABLET 650 MG: at 08:03

## 2020-02-11 RX ADMIN — ASPIRIN 81 MG 81 MG: 81 TABLET ORAL at 08:04

## 2020-02-11 RX ADMIN — GABAPENTIN 300 MG: 600 TABLET, FILM COATED ORAL at 14:10

## 2020-02-11 RX ADMIN — FAMOTIDINE 20 MG: 20 TABLET ORAL at 19:56

## 2020-02-11 RX ADMIN — Medication 10 ML: at 19:58

## 2020-02-11 RX ADMIN — GABAPENTIN 300 MG: 600 TABLET, FILM COATED ORAL at 08:03

## 2020-02-11 RX ADMIN — POLYETHYLENE GLYCOL 3350 17 G: 17 POWDER, FOR SOLUTION ORAL at 08:03

## 2020-02-11 RX ADMIN — BACLOFEN 15 MG: 10 TABLET ORAL at 19:53

## 2020-02-11 RX ADMIN — ZOLPIDEM TARTRATE 10 MG: 5 TABLET ORAL at 19:52

## 2020-02-11 RX ADMIN — HEPARIN SODIUM 5000 UNITS: 5000 INJECTION INTRAVENOUS; SUBCUTANEOUS at 14:09

## 2020-02-11 RX ADMIN — LINEZOLID 600 MG: 600 INJECTION, SOLUTION INTRAVENOUS at 16:14

## 2020-02-11 RX ADMIN — SODIUM BICARBONATE 650 MG TABLET 650 MG: at 19:54

## 2020-02-11 RX ADMIN — CASTOR OIL AND BALSAM, PERU: 788; 87 OINTMENT TOPICAL at 19:53

## 2020-02-11 RX ADMIN — GABAPENTIN 300 MG: 600 TABLET, FILM COATED ORAL at 19:56

## 2020-02-11 RX ADMIN — CYANOCOBALAMIN 1000 MCG: 1000 INJECTION, SOLUTION INTRAMUSCULAR; SUBCUTANEOUS at 08:04

## 2020-02-11 RX ADMIN — SENNOSIDES 17.2 MG: 8.6 TABLET, FILM COATED ORAL at 08:03

## 2020-02-11 RX ADMIN — CASTOR OIL AND BALSAM, PERU: 788; 87 OINTMENT TOPICAL at 08:05

## 2020-02-11 RX ADMIN — HEPARIN SODIUM 5000 UNITS: 5000 INJECTION INTRAVENOUS; SUBCUTANEOUS at 05:53

## 2020-02-11 RX ADMIN — Medication 10 ML: at 08:04

## 2020-02-11 RX ADMIN — LEVOTHYROXINE SODIUM 50 MCG: 0.05 TABLET ORAL at 05:53

## 2020-02-11 RX ADMIN — MEROPENEM 1 G: 1 INJECTION, POWDER, FOR SOLUTION INTRAVENOUS at 05:53

## 2020-02-11 RX ADMIN — TAMSULOSIN HYDROCHLORIDE 0.4 MG: 0.4 CAPSULE ORAL at 08:03

## 2020-02-11 RX ADMIN — Medication 6 MG: at 19:54

## 2020-02-11 RX ADMIN — LINEZOLID 600 MG: 600 INJECTION, SOLUTION INTRAVENOUS at 04:30

## 2020-02-11 RX ADMIN — FAMOTIDINE 20 MG: 20 TABLET ORAL at 08:03

## 2020-02-11 RX ADMIN — FERROUS SULFATE TAB 325 MG (65 MG ELEMENTAL FE) 325 MG: 325 (65 FE) TAB at 08:04

## 2020-02-11 ASSESSMENT — ENCOUNTER SYMPTOMS
SHORTNESS OF BREATH: 0
SINUS PAIN: 0
EYE PAIN: 0
BACK PAIN: 0
VOMITING: 0
DIARRHEA: 0
CONSTIPATION: 0
ABDOMINAL PAIN: 0

## 2020-02-11 ASSESSMENT — VISUAL ACUITY: OU: 1

## 2020-02-11 ASSESSMENT — PAIN SCALES - GENERAL
PAINLEVEL_OUTOF10: 0
PAINLEVEL_OUTOF10: 0

## 2020-02-11 NOTE — PROGRESS NOTES
BID  polyethylene glycol (GLYCOLAX) packet 17 g, Daily  senna (SENOKOT) tablet 17.2 mg, Daily  Venelex ointment, BID  levothyroxine (SYNTHROID) tablet 50 mcg, Daily  meropenem (MERREM) 1 g in sodium chloride 0.9 % 100 mL IVPB (mini-bag), Q8H  heparin (porcine) injection 5,000 Units, 3 times per day  tamsulosin (FLOMAX) capsule 0.4 mg, Daily  ferrous sulfate tablet 325 mg, Daily with breakfast  atorvastatin (LIPITOR) tablet 80 mg, Nightly  isosorbide mononitrate (IMDUR) extended release tablet 30 mg, Daily  niacin (NIASPAN) extended release tablet 500 mg, Nightly PRN  famotidine (PEPCID) tablet 20 mg, BID  sodium chloride flush 0.9 % injection 10 mL, 2 times per day  sodium chloride flush 0.9 % injection 10 mL, PRN  magnesium hydroxide (MILK OF MAGNESIA) 400 MG/5ML suspension 30 mL, Daily PRN  ondansetron (ZOFRAN) injection 4 mg, Q6H PRN  acetaminophen (TYLENOL) tablet 650 mg, Q4H PRN        Review of Systems:   14 point ROS obtained but were negative except mentioned in HPI      Physical exam:     Vitals:  BP (!) 165/86   Pulse 89   Temp 98.2 °F (36.8 °C) (Oral)   Resp 17   Ht 5' 11\" (1.803 m)   Wt 207 lb 3.2 oz (94 kg)   SpO2 94%   BMI 28.90 kg/m²   Constitutional:  OAA X2 (self, place), NAD  Skin: noted bilat leg chronic skin venous changes, multiple eczematous spots  Neck: no jvd noted, limited ROM  Cardiovascular:  S1, S2 no r/g, 2/6 holosystolic murmur in LUSB  Respiratory: CTA B without w/r/r  Abdomen:  +bs, soft, nt, nd  Ext: chornic lower extremity edema up to knees  Psychiatric: mood and affect appropriate, occasional tangent speech. Musculoskeletal:  Limited spastic ROM, reduced muscle bulk.     Data:   Labs:  CBC:   Recent Labs     02/09/20  0523 02/10/20  0436 02/11/20  0602   WBC 8.3 7.3 5.2   HGB 9.5* 9.9* 9.8*    155 135     BMP:    Recent Labs     02/09/20  0522 02/10/20  0436 02/11/20  0603    136 138   K 5.1 5.0 4.3    100 102   CO2 22 23 22   BUN 21* 22* 20   CREATININE 1.0 1.1 1.0   GLUCOSE 111* 114* 114*     Ca/Mg/Phos:   Recent Labs     02/09/20  0522 02/10/20  0436 02/11/20  0603   CALCIUM 8.7 9.1 8.5   Adjusted Ca+2 today to 8.7 g/dl    Hepatic:   No results for input(s): AST, ALT, ALB, BILITOT, ALKPHOS in the last 72 hours. Troponin: No results for input(s): TROPONINI in the last 72 hours. BNP: No results for input(s): BNP in the last 72 hours. Lipids: No results for input(s): CHOL, TRIG, HDL, LDLCALC, LABVLDL in the last 72 hours. ABGs:   No results for input(s): PHART, PO2ART, EPX4NBG in the last 72 hours. INR: No results for input(s): INR in the last 72 hours. UA:  No results for input(s): Ignacia Harrier, GLUCOSEU, BILIRUBINUR, KETUA, SPECGRAV, BLOODU, PHUR, PROTEINU, UROBILINOGEN, NITRU, LEUKOCYTESUR, Linda Nunnery in the last 72 hours. Urine Microscopic:   No results for input(s): LABCAST, BACTERIA, COMU, HYALCAST, WBCUA, RBCUA, EPIU in the last 72 hours. Urine Culture:   No results for input(s): LABURIN in the last 72 hours. Urine Chemistry:   No results for input(s): Jacquetta Bath, PROTEINUR, NAUR in the last 72 hours. IMAGING:  XR CHEST PORTABLE   Final Result   Cardiomegaly. Small left pleural effusion. Diffuse airspace opacities in    both lungs with interstitial prominence appears slightly improved and    could represent improving pulmonary edema or pneumonia. CT HEAD WO CONTRAST   Final Result      No evidence of acute intracranial abnormality. XR CHEST PORTABLE   Final Result      Perihilar consolidation bilaterally compatible with atypical pneumonia or pulmonary edema. Cardiomegaly status post sternotomy. Silhouetting the left hemidiaphragm may be due to left lower lobe consolidation or portable technique. PA and lateral chest x-ray would be helpful for further evaluation.       VL Extremity Venous Right   Final Result      XR CHEST PORTABLE   Final Result      Unchanged small left pleural effusion and left basilar atelectasis versus pneumonia. XR CHEST STANDARD (2 VW)   Final Result      Left persistent pleural-based consolidation and pleural effusion. Slightly increased compared to prior study      Prior sternotomy. Right lung remains clear. Assessment/Plan   1. ALIS:     2. Hyperkalemia:  RTA 4     3. Acid- base/ Electrolyte imbalance: RTA 4      4. Normocytic anemia:     5.  HTN:     PLAN:  - Changed to oral bicarb   - Low K diet   - Monitor K   - Cr stable   - abx   - cont wyatt     Thank you for allowing us to participate in care of Sabra Collins MD

## 2020-02-11 NOTE — CARE COORDINATION
CM following, pt with increased confusion poss withdrawal from gabapentin and/or baclofen. Pt moved closer to nurses station. precert has  will need new auth for [employer]Pass[employer] through [address] [facility].   Electronically signed by Therese Sánchez RN on 2020 at 4:59 PM  [phone]

## 2020-02-11 NOTE — PLAN OF CARE
Problem: Falls - Risk of:  Goal: Will remain free from falls  Description  Will remain free from falls  2/10/2020 2309 by Henri Stanley RN  Outcome: Ongoing   Pt has non skid socks on, call light within reach, bed in lowest position with wheels locked, 2/4 side rails up, and bed alarm on. AVBuscoTurnos camera in place for safety measures.

## 2020-02-11 NOTE — PROGRESS NOTES
Nephrology Consult Note                                                                                                                                                                                                                                                                                                                              Patient's Name: Alex MALLORY better   Bicarb better   Good UO   Iglesias in place     Past Medical History:   Diagnosis Date    BPH (benign prostatic hyperplasia)     Carotid stenosis 12/2013    JESU 21-65% stenosis; LICA 43-19% stenosis    Cellulitis 12/2013    LLE    Chronic back pain     Diverticular disease     Erectile dysfunction     GERD (gastroesophageal reflux disease)     History of atrial fibrillation     Hypercholesteremia     Hypertension     Lower GI bleed     MDRO (multiple drug resistant organisms) resistance 10/26/2019    urine    Neuromuscular disorder (HCC)     spasticity    Renal insufficiency     Risk for falls     uses walker    Tinea corporis 12/2013    LLE    Vitamin B12 deficiency        Past Surgical History:   Procedure Laterality Date    BACK SURGERY  2006    lower lumbar    CORONARY ARTERY BYPASS GRAFT  12/13/2013    CABG x 5 (Dr Chiqui Batista)    CYSTOSCOPY N/A 1/10/2019    CYSTOSCOPY performed by Zamzam Cotton MD at [facility] 1690 Right 5/1/2015    [facility]       Family History   Problem Relation Age of Onset    Heart Disease Father         reports that he quit smoking about 50 years ago. He has never used smokeless tobacco. He reports that he does not drink alcohol or use drugs.     Allergies:  Bactrim [sulfamethoxazole-trimethoprim]    Current Medications:    zolpidem (AMBIEN) tablet 10 mg, Nightly  melatonin tablet 6 mg, Nightly  aspirin chewable tablet 81 mg, Daily  gabapentin (NEURONTIN) tablet 300 mg, TID  cyanocobalamin injection 1,000 mcg, Daily  linezolid (ZYVOX) IVPB 600 mg, Q12H  sodium bicarbonate tablet 650 mg, BID  polyethylene glycol (GLYCOLAX) packet 17 g, Daily  senna (SENOKOT) tablet 17.2 mg, Daily  Venelex ointment, BID  levothyroxine (SYNTHROID) tablet 50 mcg, Daily  meropenem (MERREM) 1 g in sodium chloride 0.9 % 100 mL IVPB (mini-bag), Q8H  heparin (porcine) injection 5,000 Units, 3 times per day  tamsulosin (FLOMAX) capsule 0.4 mg, Daily  ferrous sulfate tablet 325 mg, Daily with breakfast  atorvastatin (LIPITOR) tablet 80 mg, Nightly  isosorbide mononitrate (IMDUR) extended release tablet 30 mg, Daily  niacin (NIASPAN) extended release tablet 500 mg, Nightly PRN  famotidine (PEPCID) tablet 20 mg, BID  sodium chloride flush 0.9 % injection 10 mL, 2 times per day  sodium chloride flush 0.9 % injection 10 mL, PRN  magnesium hydroxide (MILK OF MAGNESIA) 400 MG/5ML suspension 30 mL, Daily PRN  ondansetron (ZOFRAN) injection 4 mg, Q6H PRN  acetaminophen (TYLENOL) tablet 650 mg, Q4H PRN        Review of Systems:   14 point ROS obtained but were negative except mentioned in HPI      Physical exam:     Vitals:  BP (!) 158/78   Pulse 84   Temp 97.8 °F (36.6 °C) (Oral)   Resp 18   Ht 5' 11\" (1.803 m)   Wt 207 lb 3.2 oz (94 kg)   SpO2 93%   BMI 28.90 kg/m²   Constitutional:  OAA X2 (self, place), NAD  Skin: noted bilat leg chronic skin venous changes, multiple eczematous spots  Neck: no jvd noted, limited ROM  Cardiovascular:  S1, S2 no r/g, 2/6 holosystolic murmur in LUSB  Respiratory: CTA B without w/r/r  Abdomen:  +bs, soft, nt, nd  Ext: chornic lower extremity edema up to knees  Psychiatric: mood and affect appropriate, occasional tangent speech. Musculoskeletal:  Limited spastic ROM, reduced muscle bulk.     Data:   Labs:  CBC:   Recent Labs     02/08/20  0439 02/09/20  0523 02/10/20  0436   WBC 10.0 8.3 7.3   HGB 10.6* 9.5* 9.9*    157 155     BMP:    Recent Labs     02/08/20  0439 02/09/20  0522 02/10/20  0436    139 136   K 4.9 5.1 5.0    105 100   CO2 25 22 23 BUN 21* 21* 22*   CREATININE 0.7* 1.0 1.1   GLUCOSE 77 111* 114*     Ca/Mg/Phos:   Recent Labs     02/08/20  0439 02/09/20  0522 02/10/20  0436   CALCIUM 9.0 8.7 9.1   Adjusted Ca+2 today to 8.7 g/dl    Hepatic:   No results for input(s): AST, ALT, ALB, BILITOT, ALKPHOS in the last 72 hours. Troponin: No results for input(s): TROPONINI in the last 72 hours. BNP: No results for input(s): BNP in the last 72 hours. Lipids: No results for input(s): CHOL, TRIG, HDL, LDLCALC, LABVLDL in the last 72 hours. ABGs:   No results for input(s): PHART, PO2ART, WUD5XTL in the last 72 hours. INR: No results for input(s): INR in the last 72 hours. UA:  No results for input(s): Sylvie Harp, GLUCOSEU, BILIRUBINUR, KETUA, SPECGRAV, BLOODU, PHUR, PROTEINU, UROBILINOGEN, NITRU, LEUKOCYTESUR, Vilinda Muck in the last 72 hours. Urine Microscopic:   No results for input(s): LABCAST, BACTERIA, COMU, HYALCAST, WBCUA, RBCUA, EPIU in the last 72 hours. Urine Culture:   No results for input(s): LABURIN in the last 72 hours. Urine Chemistry:   No results for input(s): Lieutenant Brisker, PROTEINUR, NAUR in the last 72 hours. IMAGING:  XR CHEST PORTABLE   Final Result   Cardiomegaly. Small left pleural effusion. Diffuse airspace opacities in    both lungs with interstitial prominence appears slightly improved and    could represent improving pulmonary edema or pneumonia. CT HEAD WO CONTRAST   Final Result      No evidence of acute intracranial abnormality. XR CHEST PORTABLE   Final Result      Perihilar consolidation bilaterally compatible with atypical pneumonia or pulmonary edema. Cardiomegaly status post sternotomy. Silhouetting the left hemidiaphragm may be due to left lower lobe consolidation or portable technique. PA and lateral chest x-ray would be helpful for further evaluation.       VL Extremity Venous Right   Final Result      XR CHEST PORTABLE   Final Result      Unchanged small left pleural effusion and left basilar atelectasis versus pneumonia. XR CHEST STANDARD (2 VW)   Final Result      Left persistent pleural-based consolidation and pleural effusion. Slightly increased compared to prior study      Prior sternotomy. Right lung remains clear. Assessment/Plan   1. ALIS:     2. Hyperkalemia:  RTA 4     3. Acid- base/ Electrolyte imbalance: RTA 4      4. Normocytic anemia:     5.  HTN:     PLAN:  - Changed to oral bicarb   - Low K diet   - Monitor K   - Cr stable   - abx   - cont schneider     Thank you for allowing us to participate in care of Juliette Aguayo MD

## 2020-02-11 NOTE — PROGRESS NOTES
Progress Note    Admit Date: 2/1/2020  Day: 10  Diet: DIET CARDIAC; Low Potassium    CC: Urinary retention    Interval history: Patient was reported by nursing to have hallucinations overnight. Had not been sleeping as of 1 AM despite increased doses of melatonin and Ambien. BP slightly elevated this AM (160/81). 1.775 L urine output in last 24 hours. This AM the patient is ANOx3 although his speech seems more disrupted. He does not seem to actively be having hallucinations while interviewed at 8 AM today. He admits to some tremors (improving), leg pain and ear pain. He denies any other pain including chest pain, SOB, abdominal pain, constipation and diarrhea.      Medications:     Scheduled Meds:   zolpidem  10 mg Oral Nightly    melatonin  6 mg Oral Nightly    aspirin  81 mg Oral Daily    gabapentin  300 mg Oral TID    cyanocobalamin  1,000 mcg Intramuscular Daily    linezolid  600 mg Intravenous Q12H    sodium bicarbonate  650 mg Oral BID    polyethylene glycol  17 g Oral Daily    senna  2 tablet Oral Daily    Venelex   Topical BID    levothyroxine  50 mcg Oral Daily    meropenem  1 g Intravenous Q8H    heparin (porcine)  5,000 Units Subcutaneous 3 times per day    tamsulosin  0.4 mg Oral Daily    ferrous sulfate  325 mg Oral Daily with breakfast    atorvastatin  80 mg Oral Nightly    isosorbide mononitrate  30 mg Oral Daily    famotidine  20 mg Oral BID    sodium chloride flush  10 mL Intravenous 2 times per day     Continuous Infusions:  PRN Meds:niacin, sodium chloride flush, magnesium hydroxide, ondansetron, acetaminophen    Objective:   Vitals:   T-max:  Patient Vitals for the past 8 hrs:   BP Temp Temp src Pulse Resp SpO2   02/11/20 1105 124/66 98.8 °F (37.1 °C) Oral 95 17 96 %   02/11/20 0748 (!) 165/86 98.2 °F (36.8 °C) Oral 89 17 94 %       Intake/Output Summary (Last 24 hours) at 2/11/2020 1326  Last data filed at 2/11/2020 1106  Gross per 24 hour   Intake 640 ml   Output 1975 ml   Net -1335 ml     Review of Systems   Constitutional: Negative for fatigue and fever. HENT: Positive for ear pain. Negative for sinus pain. Eyes: Negative for pain. Respiratory: Negative for shortness of breath. Cardiovascular: Positive for leg swelling. Negative for chest pain. Gastrointestinal: Negative for abdominal pain, constipation, diarrhea and vomiting. Genitourinary: Negative for flank pain. Musculoskeletal: Negative for back pain and neck pain. Neurological: Positive for tremors. Negative for headaches. Psychiatric/Behavioral: Positive for agitation, hallucinations and sleep disturbance. Negative for behavioral problems and confusion. Physical Exam  Constitutional:       General: He is awake. He is not in acute distress. Appearance: Normal appearance. HENT:      Head: Normocephalic and atraumatic. Nose: Nose normal.   Eyes:      General: Vision grossly intact. Gaze aligned appropriately. Right eye: No discharge. Left eye: No discharge. Extraocular Movements: Extraocular movements intact. Conjunctiva/sclera: Conjunctivae normal.   Neck:      Musculoskeletal: Full passive range of motion without pain, normal range of motion and neck supple. Cardiovascular:      Rate and Rhythm: Normal rate and regular rhythm. Pulses: Normal pulses. Heart sounds: Normal heart sounds. Pulmonary:      Effort: Pulmonary effort is normal.      Breath sounds: Normal breath sounds. Abdominal:      General: There is no distension. There are no signs of injury. Palpations: Abdomen is soft. Tenderness: There is no abdominal tenderness. Musculoskeletal: Normal range of motion. General: Swelling present. No deformity or signs of injury. Right lower leg: Edema present. Left lower leg: Edema present. Skin:     General: Skin is warm. Neurological:      General: No focal deficit present.       Mental Status: He is alert, oriented to person, place, and time and easily aroused. Mental status is at baseline. Motor: Motor function is intact. Coordination: Coordination is intact. Gait: Gait is intact. Comments: Speech seems less fluid compared to previous days. ANOx3. Psychiatric:         Attention and Perception: Attention and perception normal.         Mood and Affect: Affect normal.         Speech: Speech normal.         Behavior: Behavior is cooperative. Cognition and Memory: Cognition normal.       LABS:    CBC:   Recent Labs     02/09/20  0523 02/10/20  0436 02/11/20  0602   WBC 8.3 7.3 5.2   HGB 9.5* 9.9* 9.8*   HCT 28.4* 30.3* 29.1*    155 135   MCV 96.8 97.5 95.8     Renal:    Recent Labs     02/09/20  0522 02/10/20  0436 02/11/20  0603    136 138   K 5.1 5.0 4.3    100 102   CO2 22 23 22   BUN 21* 22* 20   CREATININE 1.0 1.1 1.0   GLUCOSE 111* 114* 114*   CALCIUM 8.7 9.1 8.5   ANIONGAP 12 13 14     Hepatic: No results for input(s): AST, ALT, BILITOT, BILIDIR, PROT, LABALBU, ALKPHOS in the last 72 hours. Troponin: No results for input(s): TROPONINI in the last 72 hours. BNP: No results for input(s): BNP in the last 72 hours. Lipids: No results for input(s): CHOL, HDL in the last 72 hours. Invalid input(s): LDLCALCU, TRIGLYCERIDE  ABGs:  No results for input(s): PHART, HUC0SCD, PO2ART, JLW3DBM, BEART, THGBART, T7UHAITO, WVX6PUL in the last 72 hours. INR: No results for input(s): INR in the last 72 hours. Lactate: No results for input(s): LACTATE in the last 72 hours. Cultures:  -----------------------------------------------------------------  RAD:   XR CHEST PORTABLE   Final Result   Cardiomegaly. Small left pleural effusion. Diffuse airspace opacities in    both lungs with interstitial prominence appears slightly improved and    could represent improving pulmonary edema or pneumonia. CT HEAD WO CONTRAST   Final Result      No evidence of acute intracranial abnormality. XR CHEST PORTABLE   Final Result      Perihilar consolidation bilaterally compatible with atypical pneumonia or pulmonary edema. Cardiomegaly status post sternotomy. Silhouetting the left hemidiaphragm may be due to left lower lobe consolidation or portable technique. PA and lateral chest x-ray would be helpful for further evaluation. VL Extremity Venous Right   Final Result      XR CHEST PORTABLE   Final Result      Unchanged small left pleural effusion and left basilar atelectasis versus pneumonia. XR CHEST STANDARD (2 VW)   Final Result      Left persistent pleural-based consolidation and pleural effusion. Slightly increased compared to prior study      Prior sternotomy. Right lung remains clear. Assessment/Plan:     77M w/ PMH CAD s/p CABG, BPH, GERD, back pain, A-fib, CKD, venous stasis B/L LE wounds, and HLD presented on 02/01/20 w/ urinary retention after being unable to replace schneider.      1. Complicated UTI 2/2 chronic indwelling catheter  - Hx of multiple UTIs 2/2 chronic schneider (MDRO, carbapenem/zosyn sensitive). - UA (02/01/20): Leukocyte (+) / Nitrite (+). WBC . Pseudomonas (+). Meropenem-sensitive. - UA (02/05/20): Culture growing Candida.  - Plan: Meropenem 1 g IV q8h. Linezolid 600 mg IV BID. Appreciate ID recs on management looking toward discharge to SNF. Consider transition to PO linezolid.     2. PNA  - CXR (02/07/20): Perihilar consolidation bilaterally. LLL consolidation.  - CXR (02/08/20): Small left pleural effusion. Diffuse interstitial opacities bilaterally. - MRSA nasal probe (+) (02/07/20). - Pro-bill 0.82 (02/07) > 0.56 (02/09). - Plan: Antibiotic management as above. 3. Toxic encephalopathy  - Patient appears to have hallucinations this AM and last night. - Plan: Continue to monitor. May be 2/2 to gabapentin and/or baclofen withdrawal.    4. Somnolence  - Etiology unclear. Suspect mediation-associated (baclofen, gabapentin).   -

## 2020-02-11 NOTE — PLAN OF CARE
Problem: Nutrition  Goal: Optimal nutrition therapy  2/11/2020 14[address], LD  Outcome: Ongoing   Nutrition Problem: Inadequate Oral Intake   Intervention: Continue Current diet  or Start ONS    Nutrition Goals: Pt will tolerate diet and consume >75% of meals offered

## 2020-02-11 NOTE — PROGRESS NOTES
infection associated with indwelling urethral catheter, initial encounter ([facility] [address] has a past medical history of BPH (benign prostatic hyperplasia), Carotid stenosis, Cellulitis, Chronic back pain, Diverticular disease, Erectile dysfunction, GERD (gastroesophageal reflux disease), History of atrial fibrillation, Hypercholesteremia, Hypertension, Lower GI bleed, MDRO (multiple drug resistant organisms) resistance, Neuromuscular disorder ([facility] [place] [address] Renal insufficiency, Risk for falls, Tinea corporis, and Vitamin B12 deficiency. has a past surgical history that includes back surgery (2006); Foot surgery; Coronary artery bypass graft (12/13/2013); hip surgery (Right, 5/1/2015); and Cystoscopy (N/A, 1/10/2019). Restrictions  Position Activity Restriction  Other position/activity restrictions: up with assistance     Subjective   General  Chart Reviewed: Yes  Patient assessed for rehabilitation services?: Yes  Additional Pertinent Hx: Admit 2/1 with UTI / Iglesias cath dislodgement  Per EMS pt's living conditions are poor. Pt is w/c bound at baseline, Iglesias cath placed in ED         PMHX: CAD s/p CABG x5, chronic back pain with intrathecal baclofen pump, Afib, BPH with chronic Iglesias catheter, CKD, venous stasis of bilateral lower extremities  Response to previous treatment: Patient with no complaints from previous session  Family / Caregiver Present: No  Diagnosis: UTI/ Chronic Iglesias cath dislodgement   Subjective  Subjective: \" You will have to come back I'm having a conversation with my girlfriend on the phone \"  Pt when questioned about not having the phone pointed to call light in lap. Pt edu on call light vs phone and reoriented to environment. Pt worried about phone being gone. Helped pt look through draws / cabinets w/o success. Pt cooperative with mod encouragement for OOB/ OT tx.    Vital Signs  Patient Currently in Pain: Denies     Orientation  Orientation  Overall Orientation Status: Impaired  Orientation Level: Oriented to person;Disoriented to situation;Disoriented to time;Disoriented to place(reoriented to this am with cues )    Objective    ADL  Feeding: Setup  Grooming: Minimal assistance; Increased time to complete;Setup(pt needing assist to find items / initate task. )  LE Dressing: Maximum assistance;Dependent/Total(with donning socks )  Toileting: Maximum assistance(with schneider cath )  Additional Comments: .    Balance  Sitting Balance: Moderate assistance(initially with posterior and R sided push. Pt needing cues and time to right self with Min A .  sat at EOB x 12 mins with CGA / MIn )  Standing Balance: Unable to assess(comment)(transfers only )  Standing Balance  Time: 10 -15 sec   Activity: partial stance for turn to chair   Functional Mobility  Functional Mobility Comments: Pt uses WC for mobility at baseline  Toilet Transfers  Toilet - Technique: Squat pivot  Equipment Used: Standard bedside commode  Toilet Transfer: Dependent/Total;Minimal assistance;2 Person assistance  Bed mobility  Sit to Supine: Dependent/Total;Maximum assistance(x1 and Mod A x 1 2/2 strong posterior lean )  Transfers  Stand Pivot Transfers: Dependent/Total;Minimal assistance( x 2 with bed raised to chair positioned at 45 degree angle like w/c at home )  Transfer Comments: . Cognition  Overall Cognitive Status: Exceptions  Arousal/Alertness: Appropriate responses to stimuli  Following Commands: Follows one step commands with repetition  Attention Span: Difficulty attending to directions  Memory: Decreased short term memory;Decreased recall of recent events  Safety Judgement: Decreased awareness of need for safety  Insights: Not aware of deficits  Initiation: Requires cues for all  Sequencing: Requires cues for all  Cognition Comment: Pt demo poor insight/ decreased cognition. Plan  This note will serve as a discharge summary if patient is discharged from hospital before next treatment session.     Plan  Times per week: 2-5x  Times per day: Daily  Current Treatment Recommendations: Functional Mobility Training, Patient/Caregiver Education & Training, Self-Care / ADL, Safety Education & Training, Endurance Training    AM-PAC Score  AM-PAC Inpatient Daily Activity Raw Score: 14 (02/11/20 0945)  AM-PAC Inpatient ADL T-Scale Score : 33.39 (02/11/20 0945)  ADL Inpatient CMS 0-100% Score: 59.67 (02/11/20 0945)  ADL Inpatient CMS G-Code Modifier : CK (02/11/20 0945)    Goals  Short term goals  Time Frame for Short term goals: at d/c   Short term goal 1: Bed /chair transfers <> BSC with CGA -  not met   Short term goal 2: LE Dressing with SBA and AE prn - not met   Patient Goals   Patient goals : Go home      Therapy Time   Individual Concurrent Group Co-treatment   Time In 0902         Time Out 0942         Minutes 40              Timed Code Treatment Minutes:  40 mins     Total Treatment Minutes:  40 mins       Myra Delgado OT

## 2020-02-11 NOTE — PLAN OF CARE
Problem: Nutrition  Goal: Optimal nutrition therapy  Outcome: Ongoing  Note:   Patient continues to eat about 50% of meals. Patient requires to be fed during meals d/t shakiness. Problem: Mood - Altered:  Goal: Mood stable  Description  Mood stable  Outcome: Ongoing  Note:   Patient continues to have ongoing hallucinations that make him very agitated. Patient often becomes frustrated with people who are not in the room.

## 2020-02-11 NOTE — PROGRESS NOTES
Pt disoriented. Pt hallucinating this evening. Pt has not sleep as of yet. Pt given nightly melatonin and Ambien this evening per orders. Pt assisted back to bed X3 with oral lindo. Pt repositioned in bed. Pt has intermittent ABX infusing per orders. Pt schneider with adequate urine output. Pt has call light within reach, bed in lowest position with wheels locked, 2/4 side rails up, and bed alarm on. Will continue to monitor.

## 2020-02-11 NOTE — PROGRESS NOTES
NUTRITION ASSESSMENT  Admission Date: 2/1/2020     Type and Reason for Visit: Reassess    NUTRITION RECOMMENDATIONS:   · Encourage PO intake on Cardiac, Low Potassium diet   · Assist pt as needed w/ meals  · ONS trial: [employer] and [employer]     NUTRITION ASSESSMENT:  Pt continues to be at nutritional compromise r/t decreased PO intake (26-50% of meals). Pt requires assistance w/ meals but is tolerating cardiac, low potassium diet. Pt w/ hallucinations and periods of agitation. Will add ONS trial and continue to monitor PO intake and mental status. MALNUTRITION ASSESSMENT  Context: Acute illness or injury   Malnutrition Status:  At risk for malnutrition  Findings of the 6 clinical characteristics of malnutrition (Minimum of 2 out of 6 clinical characteristics is required to make the diagnosis of moderate or severe Protein Calorie Malnutrition based on AND/ASPEN Guidelines):  Energy Intake %: Less than or equal to 50% of estimated energy requirement  Energy Intake Time: Greater than or equal to 5 days  Interpretation of Weight Loss %: No significant weight loss  Interpretation of Weight Loss Time: in 3 months(per EMR review)  Body Fat Status: Unable to assess  Body Fat Loss Location: (not appropriate)  Muscle Mass Status: Unable to assess     Fluid Accumulation Status: Unable to assess     Reduced  Strength: Not measured    NUTRITION DIAGNOSIS   Problem: Inadequate Oral Intake  Etiology: Cognitive or neurological impairment  Signs & Symptoms: Intake 0-25% and Intake 26-50%    NUTRITION INTERVENTION  Food and/or Nutrient Delivery:Continue Current diet  or Start ONS   Nutrition education/counseling/coordination of care: Continue Inpatient Monitoring     NUTRITION MONITORING & EVALUATION:  Evaluation:Progressing towards goal   Goals: Pt will tolerate diet and consume >75% of meals offered  Monitoring: Meal Intake  or Mental Status/Confusion      OBJECTIVE DATA:  · Nutrition-Focused Physical Findings: g/day  Estimated Daily Total Fluid (ml/day): 1009-8441 mL per day     Food / Nutrition-Related History  Pre-Admission / Home Diet:  Pre-Admission/Home Diet: General   Home Supplements / Herbals:    none noted  Food Restrictions / Cultural Requests:    none noted    Diet Orders / Intake / Nutrition Support  Current diet/supplement order: DIET CARDIAC; Low Potassium  Dietary Nutrition Supplements: Standard High Calorie Oral Supplement  Dietary Nutrition Supplements: Frozen Oral Supplement     NSG Recorded PO:   PO Fluids P.O.: 300 mL  PO Meals PO Meals Eaten (%): 26 - 50%   PO Intake: 0%  and 26-50%  PO Supplement: None   PO Supplement Intake: n/a      NUTRITION RISK LEVEL: Risk Level:  Moderate    Mello Guzman RD, LD  Kevin:  [phone]  Office:  [phone]

## 2020-02-11 NOTE — PROGRESS NOTES
Physical Therapy  Facility/Department: [facility]  Daily Treatment Note  NAME: Najma Faulkner  : 1940  MRN: [id number]    Date of Service: 2020    Discharge Recommendations:    Najma Faulkner scored a / on the AM-PAC short mobility form. Current research shows that an AM-PAC score of 17 or less is typically not associated with a discharge to the patient's home setting. Based on the patients AM-PAC score and their current functional mobility deficits, it is recommended that the patient have 3-5 sessions per week of Physical Therapy at d/c to increase the patients independence. PT Equipment Recommendations  Equipment Needed: No    Assessment   Body structures, Functions, Activity limitations: Decreased functional mobility   Assessment: Pt limited this session by cognition - hallucinating talking on the phone and seeing a bright blue light in his room. Pt requiring Ax2 for bed mobility and squat pivot transfer from EOB to chair. Pt remains below his baseline and will require further skilled PT to maximize safety and independence with functional mobility. Will continue to follow. Treatment Diagnosis: impaired functional mobility  Prognosis: Good;Fair  Decision Making: Medium Complexity  PT Education: Goals;PT Role;Plan of Care;General Safety;Transfer Training;Functional Mobility Training  Barriers to Learning: cognition   REQUIRES PT FOLLOW UP: Yes  Activity Tolerance  Activity Tolerance: Patient limited by cognitive status     Patient Diagnosis(es): The encounter diagnosis was Urinary tract infection associated with indwelling urethral catheter, initial encounter ([facility]      has a past medical history of BPH (benign prostatic hyperplasia), Carotid stenosis, Cellulitis, Chronic back pain, Diverticular disease, Erectile dysfunction, GERD (gastroesophageal reflux disease), History of atrial fibrillation, Hypercholesteremia, Hypertension, Lower GI bleed, MDRO (multiple drug resistant organisms) resistance, Neuromuscular disorder ([facility] [address] Renal insufficiency, Risk for falls, Tinea corporis, and Vitamin B12 deficiency. has a past surgical history that includes back surgery (2006); Foot surgery; Coronary artery bypass graft (12/13/2013); hip surgery (Right, 5/1/2015); and Cystoscopy (N/A, 1/10/2019). Restrictions  Position Activity Restriction  Other position/activity restrictions: up with assistance   Subjective   General  Chart Reviewed: Yes  Additional Pertinent Hx: PMH: A-fib, HTN, CABG, indwelling urinary catheter, back pain, spasticity, intrathecal baclofen pump. Pt came to ED for catheter replacement, found to have UTI. Per EMS, pt's home in poor condition. Family / Caregiver Present: No  Referring Practitioner: Mimi De Leon  Subjective  Subjective: Pt supine in bed, denying pain and agreeable to therapy. General Comment  Comments: Pt initially observed talking to someone - no one present. Pt reporting that he was talking to someone on the phone - no phone present.            Orientation  Orientation  Overall Orientation Status: Impaired  Orientation Level: Disoriented to situation;Disoriented to time;Disoriented to place;Oriented to person  Objective   Bed mobility  Sit to Supine: Dependent/Total;Maximum assistance(x1 and Mod A x 1 2/2 strong posterior lean )  Transfers  Bed to Chair: 2 Person Assistance(min Ax2 to squat pivot to pt's left to recliner)                        AM-PAC Score  AM-PAC Inpatient Mobility Raw Score : 9 (02/11/20 1010)  AM-PAC Inpatient T-Scale Score : 30.55 (02/11/20 1010)  Mobility Inpatient CMS 0-100% Score: 81.38 (02/11/20 1010)  Mobility Inpatient CMS G-Code Modifier : CM (02/11/20 1010)          Goals  Short term goals  Time Frame for Short term goals: D/C  Short term goal 1: sit<->stand SBA ongoing  Short term goal 2: bed<->chair SBA ongoing  Patient Goals   Patient goals : to go home    Plan    Plan  Times per week: 2-5  Current Treatment Recommendations: Functional Mobility Training, Transfer Training, Safety Education & Training, Endurance Training  Safety Devices  Type of devices: Call light within reach, Chair alarm in place, Left in chair, Nurse notified, Gait belt     Therapy Time   Individual Concurrent Group Co-treatment   Time In 0900         Time Out 0940         Minutes 40           Timed Code Treatment Minutes:  40    Total Treatment Minutes:  40    If the patient is discharged before the next treatment session, this note will serve as the discharge summary.      Asa Boyd, PT, DPT [id number]

## 2020-02-12 LAB
ANION GAP SERPL CALCULATED.3IONS-SCNC: 14 MMOL/L (ref 3–16)
BASOPHILS ABSOLUTE: 0 K/UL (ref 0–0.2)
BASOPHILS RELATIVE PERCENT: 0.8 %
BUN BLDV-MCNC: 19 MG/DL (ref 7–20)
CALCIUM SERPL-MCNC: 8.3 MG/DL (ref 8.3–10.6)
CHLORIDE BLD-SCNC: 102 MMOL/L (ref 99–110)
CO2: 22 MMOL/L (ref 21–32)
CREAT SERPL-MCNC: 1 MG/DL (ref 0.8–1.3)
EOSINOPHILS ABSOLUTE: 0.3 K/UL (ref 0–0.6)
EOSINOPHILS RELATIVE PERCENT: 5.5 %
GFR AFRICAN AMERICAN: >60
GFR NON-AFRICAN AMERICAN: >60
GLUCOSE BLD-MCNC: 90 MG/DL (ref 70–99)
HCT VFR BLD CALC: 29.6 % (ref 40.5–52.5)
HEMOGLOBIN: 9.8 G/DL (ref 13.5–17.5)
LYMPHOCYTES ABSOLUTE: 1 K/UL (ref 1–5.1)
LYMPHOCYTES RELATIVE PERCENT: 18 %
MCH RBC QN AUTO: 32.7 PG (ref 26–34)
MCHC RBC AUTO-ENTMCNC: 33.2 G/DL (ref 31–36)
MCV RBC AUTO: 98.5 FL (ref 80–100)
MONOCYTES ABSOLUTE: 0.5 K/UL (ref 0–1.3)
MONOCYTES RELATIVE PERCENT: 9.7 %
NEUTROPHILS ABSOLUTE: 3.6 K/UL (ref 1.7–7.7)
NEUTROPHILS RELATIVE PERCENT: 66 %
PDW BLD-RTO: 14.9 % (ref 12.4–15.4)
PLATELET # BLD: 120 K/UL (ref 135–450)
PMV BLD AUTO: 8.5 FL (ref 5–10.5)
POTASSIUM REFLEX MAGNESIUM: 4.2 MMOL/L (ref 3.5–5.1)
RBC # BLD: 3.01 M/UL (ref 4.2–5.9)
SODIUM BLD-SCNC: 138 MMOL/L (ref 136–145)
WBC # BLD: 5.5 K/UL (ref 4–11)

## 2020-02-12 PROCEDURE — 97535 SELF CARE MNGMENT TRAINING: CPT

## 2020-02-12 PROCEDURE — 6370000000 HC RX 637 (ALT 250 FOR IP): Performed by: STUDENT IN AN ORGANIZED HEALTH CARE EDUCATION/TRAINING PROGRAM

## 2020-02-12 PROCEDURE — 2580000003 HC RX 258: Performed by: STUDENT IN AN ORGANIZED HEALTH CARE EDUCATION/TRAINING PROGRAM

## 2020-02-12 PROCEDURE — 6370000000 HC RX 637 (ALT 250 FOR IP): Performed by: INTERNAL MEDICINE

## 2020-02-12 PROCEDURE — 99232 SBSQ HOSP IP/OBS MODERATE 35: CPT | Performed by: INTERNAL MEDICINE

## 2020-02-12 PROCEDURE — 6360000002 HC RX W HCPCS: Performed by: INTERNAL MEDICINE

## 2020-02-12 PROCEDURE — 6370000000 HC RX 637 (ALT 250 FOR IP): Performed by: UROLOGY

## 2020-02-12 PROCEDURE — 85025 COMPLETE CBC W/AUTO DIFF WBC: CPT

## 2020-02-12 PROCEDURE — 36415 COLL VENOUS BLD VENIPUNCTURE: CPT

## 2020-02-12 PROCEDURE — 6360000002 HC RX W HCPCS: Performed by: STUDENT IN AN ORGANIZED HEALTH CARE EDUCATION/TRAINING PROGRAM

## 2020-02-12 PROCEDURE — 80048 BASIC METABOLIC PNL TOTAL CA: CPT

## 2020-02-12 PROCEDURE — 97530 THERAPEUTIC ACTIVITIES: CPT

## 2020-02-12 PROCEDURE — 1200000000 HC SEMI PRIVATE

## 2020-02-12 RX ORDER — LINEZOLID 600 MG/1
600 TABLET, FILM COATED ORAL EVERY 12 HOURS SCHEDULED
Status: DISCONTINUED | OUTPATIENT
Start: 2020-02-12 | End: 2020-02-13 | Stop reason: HOSPADM

## 2020-02-12 RX ADMIN — CASTOR OIL AND BALSAM, PERU: 788; 87 OINTMENT TOPICAL at 10:24

## 2020-02-12 RX ADMIN — TAMSULOSIN HYDROCHLORIDE 0.4 MG: 0.4 CAPSULE ORAL at 08:00

## 2020-02-12 RX ADMIN — GABAPENTIN 300 MG: 600 TABLET, FILM COATED ORAL at 08:00

## 2020-02-12 RX ADMIN — GABAPENTIN 300 MG: 600 TABLET, FILM COATED ORAL at 15:02

## 2020-02-12 RX ADMIN — FERROUS SULFATE TAB 325 MG (65 MG ELEMENTAL FE) 325 MG: 325 (65 FE) TAB at 08:01

## 2020-02-12 RX ADMIN — MUPIROCIN: 20 OINTMENT TOPICAL at 11:51

## 2020-02-12 RX ADMIN — Medication 10 ML: at 10:25

## 2020-02-12 RX ADMIN — LINEZOLID 600 MG: 600 INJECTION, SOLUTION INTRAVENOUS at 05:46

## 2020-02-12 RX ADMIN — ASPIRIN 81 MG 81 MG: 81 TABLET ORAL at 07:57

## 2020-02-12 RX ADMIN — CYANOCOBALAMIN 1000 MCG: 1000 INJECTION, SOLUTION INTRAMUSCULAR; SUBCUTANEOUS at 11:49

## 2020-02-12 RX ADMIN — LEVOTHYROXINE SODIUM 50 MCG: 0.05 TABLET ORAL at 05:47

## 2020-02-12 RX ADMIN — SODIUM BICARBONATE 650 MG TABLET 650 MG: at 07:59

## 2020-02-12 RX ADMIN — ISOSORBIDE MONONITRATE 30 MG: 30 TABLET, EXTENDED RELEASE ORAL at 07:58

## 2020-02-12 RX ADMIN — FAMOTIDINE 20 MG: 20 TABLET ORAL at 07:58

## 2020-02-12 RX ADMIN — HEPARIN SODIUM 5000 UNITS: 5000 INJECTION INTRAVENOUS; SUBCUTANEOUS at 05:47

## 2020-02-12 RX ADMIN — HEPARIN SODIUM 5000 UNITS: 5000 INJECTION INTRAVENOUS; SUBCUTANEOUS at 15:02

## 2020-02-12 ASSESSMENT — PAIN DESCRIPTION - LOCATION: LOCATION: EAR;FOOT

## 2020-02-12 ASSESSMENT — PAIN DESCRIPTION - PROGRESSION: CLINICAL_PROGRESSION: NOT CHANGED

## 2020-02-12 ASSESSMENT — ENCOUNTER SYMPTOMS
SINUS PAIN: 0
DIARRHEA: 0
BACK PAIN: 0
VOMITING: 0
CONSTIPATION: 0
ABDOMINAL PAIN: 0
EYE PAIN: 0
SHORTNESS OF BREATH: 0

## 2020-02-12 ASSESSMENT — PAIN DESCRIPTION - PAIN TYPE: TYPE: ACUTE PAIN

## 2020-02-12 ASSESSMENT — PAIN SCALES - GENERAL
PAINLEVEL_OUTOF10: 0
PAINLEVEL_OUTOF10: 0

## 2020-02-12 ASSESSMENT — VISUAL ACUITY: OU: 1

## 2020-02-12 NOTE — PROGRESS NOTES
Pt remains confused/hallunicating. Unable to be redirected. Pulling @ lines. Camera remains in place. Pt closer to nursing station w frequent rounding. Bed alarm engaged. Specialty bed in place. Ace wraps to BLE. venelex to coccyx. Continues to receive intermittent abx therapy. Iglesias in place draining curly colored urine to gravity w adequate output. Bed remains in lowest position. All needs met @ this time.  Will cont to monitor

## 2020-02-12 NOTE — PROGRESS NOTES
Pt alert but remains confused. Camera and sitter remain in place. Pt is closer to nurses station. Specialty bed. Ace wraps BLE. venelex will be applied on coccyx. Iglesias remains in place. Adequate urine. Pt has scattered skin issues. Will continue to monitor.

## 2020-02-12 NOTE — PROGRESS NOTES
Progress Note    Admit Date: 2/1/2020  Day: 11  Diet: DIET CARDIAC; Low Potassium  Dietary Nutrition Supplements: Standard High Calorie Oral Supplement  Dietary Nutrition Supplements: Frozen Oral Supplement    CC: Urinary retention    Interval history: Patient with some continuing confusions and hallucinations overnight per nursing. Pulling at lines. Slightly hypertensive this AM (156/79). 2.15 L urine output in last 24 hours. This AM he is ANO but appears to be having hallucinations and refers to an IV line that is not present, people behind him who are not present, and bags of blood that are not present. He denies chest pain, SOB, abdominal pain, nausea, vomiting, and constipation.      Medications:     Scheduled Meds:   mupirocin   Nasal BID    linezolid  600 mg Oral 2 times per day    zolpidem  10 mg Oral Nightly    melatonin  6 mg Oral Nightly    aspirin  81 mg Oral Daily    gabapentin  300 mg Oral TID    cyanocobalamin  1,000 mcg Intramuscular Daily    sodium bicarbonate  650 mg Oral BID    polyethylene glycol  17 g Oral Daily    senna  2 tablet Oral Daily    Venelex   Topical BID    levothyroxine  50 mcg Oral Daily    heparin (porcine)  5,000 Units Subcutaneous 3 times per day    tamsulosin  0.4 mg Oral Daily    ferrous sulfate  325 mg Oral Daily with breakfast    atorvastatin  80 mg Oral Nightly    isosorbide mononitrate  30 mg Oral Daily    famotidine  20 mg Oral BID    sodium chloride flush  10 mL Intravenous 2 times per day     Continuous Infusions:  PRN Meds:niacin, sodium chloride flush, magnesium hydroxide, ondansetron, acetaminophen    Objective:   Vitals:   T-max:  Patient Vitals for the past 8 hrs:   BP Temp Temp src Pulse Resp SpO2   02/12/20 0754 (!) 167/75 99.2 °F (37.3 °C) Oral 70 16 96 %       Intake/Output Summary (Last 24 hours) at 2/12/2020 1114  Last data filed at 2/12/2020 0803  Gross per 24 hour   Intake 140 ml   Output 1950 ml   Net -1810 ml     Review of Systems Constitutional: Negative for fatigue and fever. HENT: Negative for sinus pain. Eyes: Negative for pain. Respiratory: Negative for shortness of breath. Cardiovascular: Positive for leg swelling. Negative for chest pain. Gastrointestinal: Negative for abdominal pain, constipation, diarrhea and vomiting. Genitourinary: Negative for flank pain. Musculoskeletal: Negative for back pain and neck pain. Neurological: Negative for headaches. Psychiatric/Behavioral: Positive for hallucinations. Negative for agitation, behavioral problems and confusion. Physical Exam  Constitutional:       General: He is awake. He is not in acute distress. Appearance: Normal appearance. HENT:      Head: Normocephalic and atraumatic. Nose: Nose normal.   Eyes:      General: Vision grossly intact. Gaze aligned appropriately. Right eye: No discharge. Left eye: No discharge. Extraocular Movements: Extraocular movements intact. Conjunctiva/sclera: Conjunctivae normal.   Neck:      Musculoskeletal: Full passive range of motion without pain, normal range of motion and neck supple. Cardiovascular:      Rate and Rhythm: Normal rate and regular rhythm. Pulses: Normal pulses. Heart sounds: Normal heart sounds. Pulmonary:      Effort: Pulmonary effort is normal.      Breath sounds: Normal breath sounds. Abdominal:      General: There is no distension. There are no signs of injury. Palpations: Abdomen is soft. Tenderness: There is no abdominal tenderness. Musculoskeletal: Normal range of motion. General: Swelling present. No deformity or signs of injury. Right lower leg: Edema present. Left lower leg: Edema present. Skin:     General: Skin is warm. Neurological:      General: No focal deficit present. Mental Status: He is alert, oriented to person, place, and time and easily aroused. Mental status is at baseline.       Motor: Motor function is intact. Coordination: Coordination is intact. Gait: Gait is intact. Psychiatric:         Attention and Perception: Attention and perception normal.         Mood and Affect: Mood and affect normal.         Speech: Speech normal.         Behavior: Behavior normal. Behavior is cooperative. Thought Content: Thought content normal.         Cognition and Memory: Cognition normal.         Judgment: Judgment normal.       LABS:    CBC:   Recent Labs     02/10/20  0436 02/11/20  0602 02/12/20  0542   WBC 7.3 5.2 5.5   HGB 9.9* 9.8* 9.8*   HCT 30.3* 29.1* 29.6*    135 120*   MCV 97.5 95.8 98.5     Renal:    Recent Labs     02/10/20  0436 02/11/20  0603 02/12/20  0542    138 138   K 5.0 4.3 4.2    102 102   CO2 23 22 22   BUN 22* 20 19   CREATININE 1.1 1.0 1.0   GLUCOSE 114* 114* 90   CALCIUM 9.1 8.5 8.3   ANIONGAP 13 14 14     Hepatic: No results for input(s): AST, ALT, BILITOT, BILIDIR, PROT, LABALBU, ALKPHOS in the last 72 hours. Troponin: No results for input(s): TROPONINI in the last 72 hours. BNP: No results for input(s): BNP in the last 72 hours. Lipids: No results for input(s): CHOL, HDL in the last 72 hours. Invalid input(s): LDLCALCU, TRIGLYCERIDE  ABGs:  No results for input(s): PHART, GQK0VLD, PO2ART, CRW6TLG, BEART, THGBART, S7NZMHXF, PHI4XCN in the last 72 hours. INR: No results for input(s): INR in the last 72 hours. Lactate: No results for input(s): LACTATE in the last 72 hours. Cultures:  -----------------------------------------------------------------  RAD:   XR CHEST PORTABLE   Final Result   Cardiomegaly. Small left pleural effusion. Diffuse airspace opacities in    both lungs with interstitial prominence appears slightly improved and    could represent improving pulmonary edema or pneumonia. CT HEAD WO CONTRAST   Final Result      No evidence of acute intracranial abnormality.                      XR CHEST PORTABLE   Final Result      Perihilar consolidation bilaterally compatible with atypical pneumonia or pulmonary edema. Cardiomegaly status post sternotomy. Silhouetting the left hemidiaphragm may be due to left lower lobe consolidation or portable technique. PA and lateral chest x-ray would be helpful for further evaluation. VL Extremity Venous Right   Final Result      XR CHEST PORTABLE   Final Result      Unchanged small left pleural effusion and left basilar atelectasis versus pneumonia. XR CHEST STANDARD (2 VW)   Final Result      Left persistent pleural-based consolidation and pleural effusion. Slightly increased compared to prior study      Prior sternotomy. Right lung remains clear. Assessment/Plan:     77M w/ PMH CAD s/p CABG, BPH, GERD, back pain, A-fib, CKD, venous stasis B/L LE wounds, and HLD presented on 02/01/20 w/ urinary retention after being unable to replace schneider.      1. Complicated UTI 2/2 chronic indwelling catheter  - Hx of multiple UTIs 2/2 chronic schneider (MDRO, carbapenem/zosyn sensitive). - UA (02/01/20): Leukocyte (+) / Nitrite (+). WBC . Pseudomonas (+). Meropenem-sensitive. - UA (02/05/20): Culture growing Candida.  - Plan: Linezolid 600 mg IV BID. Change linezolid to PO and continue through 2/14/20 per ID.      2. PNA  - CXR (02/07/20): Perihilar consolidation bilaterally. LLL consolidation.  - CXR (02/08/20): Small left pleural effusion. Diffuse interstitial opacities bilaterally. - MRSA nasal probe (+) (02/07/20). - Pro-bill 0.82 (02/07) > 0.56 (02/09). - Plan: Antibiotic management as above.      3. Toxic encephalopathy  - Patient appears to have hallucinations this AM and last night. - Plan: Continue to monitor. May be 2/2 to gabapentin and/or baclofen withdrawal.     4. Somnolence  - Etiology unclear. Suspect mediation-associated (baclofen, gabapentin). - Baclofen pump rate decreased 43.4 mcg/hr to 0.5 mcg/hr on 02/07.  - Plan: Minimize sedating medications.  Given patient's hallucinations (possible withdrawal), we will increase intrathecal baclofen dose from 0.5 to 2.5 mcg/hr. May adjust pump settings further if needed (contact: Ebony at [phone]).     5. Insomnia / Irregular sleep pattern. - Melatonin 6 mg qPM, Ambien 10 mg qPM.      6. Urinary retention  - Cysto (01/2019): Likely neurogenic bladder. Small prostate.  - 2.150 L UOP last 24 hours w/ catheter in place.  - Plan: Tamsulosin 0.4 mg QD.      7. Neuromuscular spasticity  - Associated w/ functional quadriplegia. - Plan: PT-OT. Intrathecal baclofen pump (abdomen).     8. Vitamin B12 deficiency  - Associated w/ anemia, neuropathy. - Vit B12 212 (05/02/15) > 184 (02/07/20). - TIBC low (216, 02/06) but other iron studies WNL (w/ iron supplementation). - Plan: Vit B12 1000 mcg QD. Continue Iron 325 mg QD. Gabapentin 300 mg TID for neuropathy.     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  - Cr 1.1 > 1.0 > 1.0.  - Plan: Sodium bicarb 650 mg PO BID. Nephrology following.     10. Hypothyroidism  - TSH elevated (5.03) w/ low T4 (0.8) (02/05). - Plan: Continue synthroid 50 mcg QD.     11. HTN  - BP controlled. 129/65 this AM.  - Imdur 30 mg QD.     12. HLD  - Atorvastatin 80 mg qPM.     13. Sacral wound  - Venelex ointment. Repositioning.      14. GERD  - Famotidine 20 mg BID.      15. Constipation  - PEG 17 g QD.   - Senna 17.2 mg QD.     16. CAD s/p CABG  - Continue ASA 81 mg QD.     17. PPX  - Heparin.     Code Status: Full code  FEN: Cardiac  DISPO: Floor    [facility] DO Amalia, PGY-1  02/12/20  11:14 AM    This patient has been staffed and discussed with Lydia Arauz MD.

## 2020-02-12 NOTE — PROGRESS NOTES
Occupational Therapy  Facility/Department: [facility] [address] [place] [address]  Daily Treatment Note  NAME: Alex Pardo  : 1940  MRN: [id number]    Date of Service: 2020    Discharge Recommendations:    Alex Pardo scored a 10/24 on the AM-PAC ADL Inpatient form. Current research shows that an AM-PAC score of 17 or less is typically not associated with a discharge to the patient's home setting. Based on the patients AM-PAC score and their current ADL deficits, it is recommended that the patient have 3-5 sessions per week of Occupational Therapy at d/c to increase the patients independence. OT Equipment Recommendations  Other: defer to [facility] care facility     Assessment   Performance deficits / Impairments: Decreased functional mobility ; Decreased ADL status; Decreased balance;Decreased cognition;Decreased endurance;Decreased strength    Assessment: Pt tolerated therapy session well. Pt requiring Max A X2 for bed mobility. Pt with right lean and posterior pushing intially upon sitting EOB, however progresses to CGA during session. Pt becoming weak during squat pivot transfer this date, lowering self to knees and requiring assist of 3-4 people to safely complete trasnfers to chair. Pt requiring Total A with LB ADLs this date. Pt remains far from baseline level of occupational performance and is not safe to return home at this time. Recommend continued OT services to increase safety and independence with ADLs and functional transfers. Treatment Diagnosis: impaired ADLs /functional transfers 2/2 UTI / schneider issues   Prognosis: Fair;Poor    OT Education: OT Role;Plan of Care;ADL Adaptive Strategies;Transfer Training;IADL Safety  Patient Education: pt verb partial understanding - will need reinforcement.  Pt with limited awarness of deficits    REQUIRES OT FOLLOW UP: Yes    Activity Tolerance  Activity Tolerance: Patient limited by fatigue;Treatment limited secondary to decreased cognition;Patient Tolerated treatment well    Safety Devices  Safety Devices in place: Yes  Type of devices: Call light within reach; Chair alarm in place;Nurse notified; Left in chair         Patient Diagnosis(es): The encounter diagnosis was Urinary tract infection associated with indwelling urethral catheter, initial encounter ([facility] has a past medical history of BPH (benign prostatic hyperplasia), Carotid stenosis, Cellulitis, Chronic back pain, Diverticular disease, Erectile dysfunction, GERD (gastroesophageal reflux disease), History of atrial fibrillation, Hypercholesteremia, Hypertension, Lower GI bleed, MDRO (multiple drug resistant organisms) resistance, Neuromuscular disorder ([facility] [address] Renal insufficiency, Risk for falls, Tinea corporis, and Vitamin B12 deficiency. has a past surgical history that includes back surgery (2006); Foot surgery; Coronary artery bypass graft (12/13/2013); hip surgery (Right, 5/1/2015); and Cystoscopy (N/A, 1/10/2019). Restrictions  Position Activity Restriction  Other position/activity restrictions: up with assistance      Subjective   General  Chart Reviewed: Yes  Patient assessed for rehabilitation services?: Yes    Additional Pertinent Hx: Admit 2/1 with UTI / Iglesias cath dislodgement  Per EMS pt's living conditions are poor. Pt is w/c bound at baseline, Iglesias cath placed in ED. Pt also with ALIS, hyperkalemia, pneumonia, toxic encephalopathy. PMHX: CAD s/p CABG x5, chronic back pain with intrathecal baclofen pump, Afib, BPH with chronic Iglesias catheter, CKD, venous stasis of bilateral lower extremities  Response to previous treatment: Patient with no complaints from previous session    Family / Caregiver Present: No    Diagnosis: UTI/ Chronic Iglesias cath dislodgement, ALIS, hyperkalemia, pneumonia, toxic enephalopathy    Subjective  Subjective: Pt supine in bed upon therapy arrival. Pt talking to someone that it not there about baseball players. Pt able to be redirected.  Pt appears to be unaware that he is incontinent of bowel. General Comment  Comments: RN Agreeable to therapy session. Pt seen as cotx to maximize mobilty while ensuring safety of pt and staff. Pre Treatment Pain Screening  Intervention List: Patient able to continue with treatment  Vital Signs  Patient Currently in Pain: Denies     Orientation  Orientation  Overall Orientation Status: Within Functional Limits     Objective    ADL  LE Dressing: Dependent/Total(don socks supine in bed)  Toileting: Dependent/Total(2 person assist with [place]. Max A of 1 person for static standing while OT performs all pericare and clothing mangement. Pt incontinent of bowel and using schneider catheter )        Balance  Sitting Balance: Maximum assistance(Max A initially with R lean. Pt pushing posteriorly at times. Pt progressing to periods of CGA with UE support on EOB/railing. )  Standing Balance  Time: 45 seconds  Activity: standing in [place] for pericare  Comment: Max a for static standing. Bed mobility  Supine to Sit: 2 Person assistance(Max A x2)  Transfers  Stand Pivot Transfers: Dependent/Total(Initially Max A X2, however pt becomes weak lowering self down to knees with chest resting on chair.  Pt requring Total Ax3-4 people to safely complete transfer to chair)     Plan   Plan  Times per week: 2-5x  Times per day: Daily  Current Treatment Recommendations: Functional Mobility Training, Patient/Caregiver Education & Training, Self-Care / ADL, Safety Education & Training, Endurance Training, Strengthening, ROM, Cognitive Reorientation, Equipment Evaluation, Education, & procurement, Balance Training, Positioning    AM-PAC Score        AM-PAC Inpatient Daily Activity Raw Score: 10 (02/12/20 1223)  AM-PAC Inpatient ADL T-Scale Score : 27.31 (02/12/20 1223)  ADL Inpatient CMS 0-100% Score: 74.7 (02/12/20 1223)  ADL Inpatient CMS G-Code Modifier : CL (02/12/20 1223)    Goals  Short term goals  Time Frame for Short term goals: at d/c Short term goal 1: Bed /chair transfers <> BSC with CGA -  not met   Short term goal 2: LE Dressing with SBA and AE prn - not met   Patient Goals   Patient goals : Go home        Therapy Time   Individual Concurrent Group Co-treatment   Time In 1117         Time Out 1151         Minutes 34         Timed Code Treatment Minutes: 34 Minutes    Timed Code Treatment Minutes:   34  Total Treatment Minutes:  29     If patient is d/c prior to next treatment session, this note will serve as the discharge summary    [address] [facility], OTR/L [id number]

## 2020-02-12 NOTE — PROGRESS NOTES
Due to patient's ongoing worsening lethargy, decision had been made last week to decrease his baclofen pump rate from 43.4 mcg/hr to 0.5mcg/hr.      Following that intervention, his lethargy had improved but he subsequently developed hallucinations; which was through to be 2/2 baclofen withdrawal. Hence [employer] was re consulted and the infusion rate was increased back to 25.0 mcg/hr.     Chel Vigil  Internal Medicine Resident  PGY-2

## 2020-02-12 NOTE — PROGRESS NOTES
Infectious Disease Follow up Notes    CC :  Obtundation, leukocytosis, UTI      Antibiotics:   linezolid 600 IV q12, started 2/7     Admit Date:   2/1/2020  Hospital Day: 12    Subjective:   Tm 99.2  On room air   Good UOP via schneider   He is without complaint except tired. Poor po intake. Objective:     Patient Vitals for the past 8 hrs:   BP Temp Temp src Pulse Resp SpO2   02/12/20 0754 (!) 167/75 99.2 °F (37.3 °C) Oral 70 16 96 %   02/12/20 0308 (!) 156/79 98.6 °F (37 °C) Oral 78 18 94 %       EXAM:  General:  Frail elderly male  Alert, oriented person, place, month/year. NAD    HEENT:  NCAT, PERRL. MMM  NECK:   supple   LUNGS:  Upper lobes clear bul. Non-labored breathing     CV: RRR    ABD: Soft, flat, NT     EXT:  Numerous wounds dressed.   Poor skin turgor, +ecchymoses       LINE: PIV site ok   Schneider in place, curly urine         Scheduled Meds:   baclofen  15 mg Oral TID    zolpidem  10 mg Oral Nightly    melatonin  6 mg Oral Nightly    aspirin  81 mg Oral Daily    gabapentin  300 mg Oral TID    cyanocobalamin  1,000 mcg Intramuscular Daily    linezolid  600 mg Intravenous Q12H    sodium bicarbonate  650 mg Oral BID    polyethylene glycol  17 g Oral Daily    senna  2 tablet Oral Daily    Venelex   Topical BID    levothyroxine  50 mcg Oral Daily    heparin (porcine)  5,000 Units Subcutaneous 3 times per day    tamsulosin  0.4 mg Oral Daily    ferrous sulfate  325 mg Oral Daily with breakfast    atorvastatin  80 mg Oral Nightly    isosorbide mononitrate  30 mg Oral Daily    famotidine  20 mg Oral BID    sodium chloride flush  10 mL Intravenous 2 times per day         Data Review:    Lab Results   Component Value Date    WBC 5.5 02/12/2020    HGB 9.8 (L) 02/12/2020    HCT 29.6 (L) 02/12/2020    MCV 98.5 02/12/2020     (L) 02/12/2020     Lab Results   Component Value Date    CREATININE 1.0 02/12/2020 BUN 19 2020     2020    K 4.2 2020     2020    CO2 22 2020       Hepatic Function Panel:   Lab Results   Component Value Date    ALKPHOS 184 2020    ALT 22 2020    AST 20 2020    PROT 6.4 2020    BILITOT 0.3 2020    BILIDIR <0.2 10/06/2019    IBILI see below 10/06/2019    LABALBU 2.9 2020       MICRO:   MRSA screen +  2 BC x2 neg  2/ UC neg   2/5 UC Candida albicans   BC x2 neg  2/ BC x2 neg  2 Urine culture: +Pseudomonas aeruginosa        Antibiotic Interpretation YANCY   cefepime Sensitive 4 mcg/mL   ciprofloxacin Sensitive <=1 mcg/mL   gentamicin Sensitive <=4 mcg/mL   meropenem Sensitive <=1 mcg/mL   piperacillin-tazobactam Sensitive <=16 mcg/mL   tobramycin Sensitive <=4 mcg/mL            IMAGIN/8 CXR   Impression   Cardiomegaly. Small left pleural effusion. Diffuse airspace opacities in    both lungs with interstitial prominence appears slightly improved and    could represent improving pulmonary edema or pneumonia.  CT Head w/o: No evidence of acute intracranial abnormality.  CXR: Perihilar consolidation bilaterally compatible with atypical pneumonia or pulmonary edema.  CXR: Unchanged small left pleural effusion and left basilar atelectasis versus pneumonia.    CXR:   - Consolidative changes identified within the posterior medial aspect the left lower lobe correspond to rounded atelectatic changes on chest CT dated 2019.  - Small left pleural effusion.  - Hiatal hernia.        Assessment:     Patient Active Problem List    Diagnosis Date Noted    Pseudomonas infection 2020    UTI (urinary tract infection) 2020    Dyspnea     Bradycardia     CHF with unknown LVEF ([facility] [address] 2020    Gross hematuria 2019    Chronic indwelling Iglesias catheter 2019    Hyperlipidemia 2019    Bilateral lower leg cellulitis 2019    Neurogenic bladder 2019    Bacteriuria 11/20/2019    Abnormality of urethral meatus 11/20/2019    Acute encephalopathy 10/08/2019    Problem with urinary catheter ([facility] [address] 10/07/2019    Edema 23/95/3663    Complicated UTI (urinary tract infection) 01/07/2019    ALIS (acute kidney injury) ([facility] [address] 01/07/2019    Hypothermia 01/03/2019    Acute low back pain without sciatica 05/26/2017    Hip fracture, right ([facility] [address] 05/01/2015    Acute right hip pain     Lower GI bleeding 11/10/2014    CAD (coronary artery disease) 01/27/2014    S/P CABG x 5 01/27/2014    Cellulitis 12/16/2013    Essential hypertension 12/16/2013    GERD (gastroesophageal reflux disease) 12/16/2013    Acute blood loss anemia 12/16/2013    Tinea corporis 12/16/2013    Carotid stenosis 12/16/2013    Hypotension 11/30/2013    Light headed 11/30/2013     Overview Note:     replace inactive diagnosis       History of CAD, AF, CKD  Neurogenic bladder with chronic indwelling schneiedr catheter    Admitted 2/1/20 with dislodged schneider, UTI with growth of Pseudomonas in UC    Developed hypothermia, lethargy, slight leukocytosis concerning for new infection   Empiric linezolid added to meropenem  CXR with bibasilar infiltrate.   Possible aspiration pneumonia   Clinically improved    Relative thrombocytopenia  Predates linezolid and stable     MRSA colonization     Plan:   Zepeda linezolid to po route and continue through 2/14/20  Topical mupirocin nare BID x5 days  Aspiration precautions  OK for DC from ID standpoint     Discussed with patient/family, all questions answered    Please call with further questions         Marrion Pallas, MD  Phone: [phone]   Fax : [phone]

## 2020-02-12 NOTE — PROGRESS NOTES
Physical Therapy  Facility/Department: [address]  Daily Treatment Note  NAME: Nat Parsons  : 1940  MRN: [id number]    Date of Service: 2020    Discharge Recommendations:    Nat Parsons scored a 9/24 on the AM-PAC short mobility form. Current research shows that an AM-PAC score of 17 or less is typically not associated with a discharge to the patient's home setting. Based on the patients AM-PAC score and their current functional mobility deficits, it is recommended that the patient have 3-5 sessions per week of Physical Therapy at d/c to increase the patients independence. PT Equipment Recommendations  Equipment Needed: No    Assessment   Body structures, Functions, Activity limitations: Decreased functional mobility   Assessment: Pt with improved cognition this session however requiring increased assist for mobility. Pt remains below his baseline and will require further skilled PT to maximize safety and independence with functional mobility. Will continue to follow. Treatment Diagnosis: impaired functional mobility  Prognosis: Good;Fair  Decision Making: Medium Complexity  PT Education: Goals;PT Role;Plan of Care;General Safety;Transfer Training;Functional Mobility Training  REQUIRES PT FOLLOW UP: Yes     Patient Diagnosis(es): The encounter diagnosis was Urinary tract infection associated with indwelling urethral catheter, initial encounter ([facility] [address] has a past medical history of BPH (benign prostatic hyperplasia), Carotid stenosis, Cellulitis, Chronic back pain, Diverticular disease, Erectile dysfunction, GERD (gastroesophageal reflux disease), History of atrial fibrillation, Hypercholesteremia, Hypertension, Lower GI bleed, MDRO (multiple drug resistant organisms) resistance, Neuromuscular disorder ([address] Renal insufficiency, Risk for falls, Tinea corporis, and Vitamin B12 deficiency. has a past surgical history that includes back surgery ();  Foot surgery; Coronary Endurance Training  Safety Devices  Type of devices: Call light within reach, Chair alarm in place, Left in chair, Nurse notified, Gait belt     Therapy Time   Individual Concurrent Group Co-treatment   Time In 1119         Time Out 1154         Minutes 35           Timed Code Treatment Minutes:  35    Total Treatment Minutes:  35    If the patient is discharged before the next treatment session, this note will serve as the discharge summary.      Olivier Ocampo, PT, DPT [id number]

## 2020-02-13 VITALS
TEMPERATURE: 97.2 F | WEIGHT: 207.2 LBS | SYSTOLIC BLOOD PRESSURE: 135 MMHG | RESPIRATION RATE: 16 BRPM | HEIGHT: 71 IN | DIASTOLIC BLOOD PRESSURE: 66 MMHG | OXYGEN SATURATION: 94 % | BODY MASS INDEX: 29.01 KG/M2 | HEART RATE: 55 BPM

## 2020-02-13 LAB
ABO/RH: NORMAL
ANION GAP SERPL CALCULATED.3IONS-SCNC: 9 MMOL/L (ref 3–16)
ANTIBODY SCREEN: NORMAL
BASOPHILS ABSOLUTE: 0.1 K/UL (ref 0–0.2)
BASOPHILS RELATIVE PERCENT: 1.2 %
BUN BLDV-MCNC: 21 MG/DL (ref 7–20)
CALCIUM SERPL-MCNC: 8 MG/DL (ref 8.3–10.6)
CHLORIDE BLD-SCNC: 108 MMOL/L (ref 99–110)
CO2: 24 MMOL/L (ref 21–32)
CREAT SERPL-MCNC: 0.8 MG/DL (ref 0.8–1.3)
EOSINOPHILS ABSOLUTE: 0.4 K/UL (ref 0–0.6)
EOSINOPHILS RELATIVE PERCENT: 7.9 %
GFR AFRICAN AMERICAN: >60
GFR NON-AFRICAN AMERICAN: >60
GLUCOSE BLD-MCNC: 84 MG/DL (ref 70–99)
HCT VFR BLD CALC: 27.6 % (ref 40.5–52.5)
HEMOGLOBIN: 8.9 G/DL (ref 13.5–17.5)
LYMPHOCYTES ABSOLUTE: 1.1 K/UL (ref 1–5.1)
LYMPHOCYTES RELATIVE PERCENT: 21.6 %
MCH RBC QN AUTO: 32.4 PG (ref 26–34)
MCHC RBC AUTO-ENTMCNC: 32.1 G/DL (ref 31–36)
MCV RBC AUTO: 101 FL (ref 80–100)
MONOCYTES ABSOLUTE: 0.4 K/UL (ref 0–1.3)
MONOCYTES RELATIVE PERCENT: 8.3 %
NEUTROPHILS ABSOLUTE: 3 K/UL (ref 1.7–7.7)
NEUTROPHILS RELATIVE PERCENT: 61 %
PDW BLD-RTO: 15.2 % (ref 12.4–15.4)
PLATELET # BLD: 89 K/UL (ref 135–450)
PLATELET SLIDE REVIEW: ABNORMAL
PMV BLD AUTO: 8.3 FL (ref 5–10.5)
POTASSIUM REFLEX MAGNESIUM: 4 MMOL/L (ref 3.5–5.1)
RBC # BLD: 2.74 M/UL (ref 4.2–5.9)
SODIUM BLD-SCNC: 141 MMOL/L (ref 136–145)
WBC # BLD: 4.9 K/UL (ref 4–11)

## 2020-02-13 PROCEDURE — 86901 BLOOD TYPING SEROLOGIC RH(D): CPT

## 2020-02-13 PROCEDURE — 86850 RBC ANTIBODY SCREEN: CPT

## 2020-02-13 PROCEDURE — 6370000000 HC RX 637 (ALT 250 FOR IP): Performed by: INTERNAL MEDICINE

## 2020-02-13 PROCEDURE — 2580000003 HC RX 258: Performed by: STUDENT IN AN ORGANIZED HEALTH CARE EDUCATION/TRAINING PROGRAM

## 2020-02-13 PROCEDURE — 6360000002 HC RX W HCPCS: Performed by: STUDENT IN AN ORGANIZED HEALTH CARE EDUCATION/TRAINING PROGRAM

## 2020-02-13 PROCEDURE — 6360000002 HC RX W HCPCS: Performed by: INTERNAL MEDICINE

## 2020-02-13 PROCEDURE — 6370000000 HC RX 637 (ALT 250 FOR IP): Performed by: STUDENT IN AN ORGANIZED HEALTH CARE EDUCATION/TRAINING PROGRAM

## 2020-02-13 PROCEDURE — 6370000000 HC RX 637 (ALT 250 FOR IP): Performed by: UROLOGY

## 2020-02-13 PROCEDURE — 80048 BASIC METABOLIC PNL TOTAL CA: CPT

## 2020-02-13 PROCEDURE — 85025 COMPLETE CBC W/AUTO DIFF WBC: CPT

## 2020-02-13 PROCEDURE — 86923 COMPATIBILITY TEST ELECTRIC: CPT

## 2020-02-13 PROCEDURE — 86900 BLOOD TYPING SEROLOGIC ABO: CPT

## 2020-02-13 PROCEDURE — 36415 COLL VENOUS BLD VENIPUNCTURE: CPT

## 2020-02-13 RX ORDER — GABAPENTIN 600 MG/1
300 TABLET ORAL 3 TIMES DAILY
Qty: 45 TABLET | Refills: 0 | Status: SHIPPED | OUTPATIENT
Start: 2020-02-13 | End: 2020-03-17 | Stop reason: CLARIF

## 2020-02-13 RX ORDER — LEVOTHYROXINE SODIUM 0.05 MG/1
50 TABLET ORAL DAILY
Qty: 30 TABLET | Refills: 0 | Status: SHIPPED | OUTPATIENT
Start: 2020-02-14 | End: 2020-03-17 | Stop reason: CLARIF

## 2020-02-13 RX ORDER — LINEZOLID 600 MG/1
600 TABLET, FILM COATED ORAL EVERY 12 HOURS SCHEDULED
Qty: 3 TABLET | Refills: 0 | Status: SHIPPED | OUTPATIENT
Start: 2020-02-13 | End: 2020-02-15

## 2020-02-13 RX ORDER — TAMSULOSIN HYDROCHLORIDE 0.4 MG/1
0.4 CAPSULE ORAL DAILY
Qty: 30 CAPSULE | Refills: 0 | Status: SHIPPED | OUTPATIENT
Start: 2020-02-14 | End: 2020-03-17 | Stop reason: CLARIF

## 2020-02-13 RX ORDER — POLYETHYLENE GLYCOL 3350 17 G/17G
17 POWDER, FOR SOLUTION ORAL DAILY
Qty: 30 EACH | Refills: 0 | Status: SHIPPED | OUTPATIENT
Start: 2020-02-14 | End: 2020-03-15

## 2020-02-13 RX ORDER — SODIUM BICARBONATE 650 MG/1
650 TABLET ORAL 2 TIMES DAILY
Qty: 60 TABLET | Refills: 0 | Status: SHIPPED | OUTPATIENT
Start: 2020-02-13 | End: 2020-03-14

## 2020-02-13 RX ADMIN — HEPARIN SODIUM 5000 UNITS: 5000 INJECTION INTRAVENOUS; SUBCUTANEOUS at 01:27

## 2020-02-13 RX ADMIN — Medication 10 ML: at 09:19

## 2020-02-13 RX ADMIN — TAMSULOSIN HYDROCHLORIDE 0.4 MG: 0.4 CAPSULE ORAL at 09:19

## 2020-02-13 RX ADMIN — HEPARIN SODIUM 5000 UNITS: 5000 INJECTION INTRAVENOUS; SUBCUTANEOUS at 09:18

## 2020-02-13 RX ADMIN — CASTOR OIL AND BALSAM, PERU: 788; 87 OINTMENT TOPICAL at 01:26

## 2020-02-13 RX ADMIN — CYANOCOBALAMIN 1000 MCG: 1000 INJECTION, SOLUTION INTRAMUSCULAR; SUBCUTANEOUS at 11:24

## 2020-02-13 RX ADMIN — ASPIRIN 81 MG 81 MG: 81 TABLET ORAL at 09:20

## 2020-02-13 RX ADMIN — LINEZOLID 600 MG: 600 TABLET, FILM COATED ORAL at 09:19

## 2020-02-13 RX ADMIN — FAMOTIDINE 20 MG: 20 TABLET ORAL at 01:27

## 2020-02-13 RX ADMIN — LINEZOLID 600 MG: 600 TABLET, FILM COATED ORAL at 01:27

## 2020-02-13 RX ADMIN — MUPIROCIN: 20 OINTMENT TOPICAL at 01:29

## 2020-02-13 RX ADMIN — CASTOR OIL AND BALSAM, PERU: 788; 87 OINTMENT TOPICAL at 09:29

## 2020-02-13 RX ADMIN — ZOLPIDEM TARTRATE 10 MG: 5 TABLET ORAL at 01:27

## 2020-02-13 RX ADMIN — MUPIROCIN: 20 OINTMENT TOPICAL at 09:29

## 2020-02-13 RX ADMIN — SODIUM BICARBONATE 650 MG TABLET 650 MG: at 01:27

## 2020-02-13 RX ADMIN — ATORVASTATIN CALCIUM 80 MG: 80 TABLET, FILM COATED ORAL at 01:26

## 2020-02-13 RX ADMIN — LINEZOLID 600 MG: 600 TABLET, FILM COATED ORAL at 18:47

## 2020-02-13 RX ADMIN — ISOSORBIDE MONONITRATE 30 MG: 30 TABLET, EXTENDED RELEASE ORAL at 09:19

## 2020-02-13 RX ADMIN — GABAPENTIN 300 MG: 600 TABLET, FILM COATED ORAL at 09:20

## 2020-02-13 RX ADMIN — FAMOTIDINE 20 MG: 20 TABLET ORAL at 09:20

## 2020-02-13 RX ADMIN — Medication 10 ML: at 03:32

## 2020-02-13 RX ADMIN — SENNOSIDES 17.2 MG: 8.6 TABLET, FILM COATED ORAL at 09:20

## 2020-02-13 RX ADMIN — SODIUM BICARBONATE 650 MG TABLET 650 MG: at 09:19

## 2020-02-13 RX ADMIN — GABAPENTIN 300 MG: 600 TABLET, FILM COATED ORAL at 15:17

## 2020-02-13 RX ADMIN — FERROUS SULFATE TAB 325 MG (65 MG ELEMENTAL FE) 325 MG: 325 (65 FE) TAB at 09:19

## 2020-02-13 RX ADMIN — GABAPENTIN 300 MG: 600 TABLET, FILM COATED ORAL at 01:26

## 2020-02-13 RX ADMIN — LEVOTHYROXINE SODIUM 50 MCG: 0.05 TABLET ORAL at 09:19

## 2020-02-13 ASSESSMENT — ENCOUNTER SYMPTOMS
DIARRHEA: 0
VOMITING: 0
CONSTIPATION: 0
BACK PAIN: 0
ABDOMINAL PAIN: 1
SINUS PAIN: 0
EYE PAIN: 0
SHORTNESS OF BREATH: 0

## 2020-02-13 ASSESSMENT — VISUAL ACUITY: OU: 1

## 2020-02-13 ASSESSMENT — PAIN SCALES - GENERAL
PAINLEVEL_OUTOF10: 0

## 2020-02-13 NOTE — PROGRESS NOTES
Nephrology Consult Note                                                                                                                                                                                                                                                                                                                              Patient's Name: Jeannie Barrientos UO   K better  Cr stable      Past Medical History:   Diagnosis Date    BPH (benign prostatic hyperplasia)     Carotid stenosis 12/2013    JESU 06-82% stenosis; LICA 90-80% stenosis    Cellulitis 12/2013    LLE    Chronic back pain     Diverticular disease     Erectile dysfunction     GERD (gastroesophageal reflux disease)     History of atrial fibrillation     Hypercholesteremia     Hypertension     Lower GI bleed     MDRO (multiple drug resistant organisms) resistance 10/26/2019    urine    Neuromuscular disorder (HCC)     spasticity    Renal insufficiency     Risk for falls     uses walker    Tinea corporis 12/2013    LLE    Vitamin B12 deficiency        Past Surgical History:   Procedure Laterality Date    BACK SURGERY  2006    lower lumbar    CORONARY ARTERY BYPASS GRAFT  12/13/2013    CABG x 5 (Dr Va Phipps)    CYSTOSCOPY N/A 1/10/2019    CYSTOSCOPY performed by Maryjo Mcintosh MD at [place] [address] Right 5/1/2015    [facility]       Family History   Problem Relation Age of Onset    Heart Disease Father         reports that he quit smoking about 50 years ago. He has never used smokeless tobacco. He reports that he does not drink alcohol or use drugs.     Allergies:  Bactrim [sulfamethoxazole-trimethoprim]    Current Medications:    mupirocin (BACTROBAN) 2 % ointment, BID  linezolid (ZYVOX) tablet 600 mg, 2 times per day  zolpidem (AMBIEN) tablet 10 mg, Nightly  melatonin tablet 6 mg, Nightly  aspirin chewable tablet 81 mg, Daily  gabapentin (NEURONTIN) tablet 300 mg, TID  cyanocobalamin injection 1,000 mcg, Daily  sodium bicarbonate tablet 650 mg, BID  polyethylene glycol (GLYCOLAX) packet 17 g, Daily  senna (SENOKOT) tablet 17.2 mg, Daily  Venelex ointment, BID  levothyroxine (SYNTHROID) tablet 50 mcg, Daily  heparin (porcine) injection 5,000 Units, 3 times per day  tamsulosin (FLOMAX) capsule 0.4 mg, Daily  ferrous sulfate tablet 325 mg, Daily with breakfast  atorvastatin (LIPITOR) tablet 80 mg, Nightly  isosorbide mononitrate (IMDUR) extended release tablet 30 mg, Daily  niacin (NIASPAN) extended release tablet 500 mg, Nightly PRN  famotidine (PEPCID) tablet 20 mg, BID  sodium chloride flush 0.9 % injection 10 mL, 2 times per day  sodium chloride flush 0.9 % injection 10 mL, PRN  magnesium hydroxide (MILK OF MAGNESIA) 400 MG/5ML suspension 30 mL, Daily PRN  ondansetron (ZOFRAN) injection 4 mg, Q6H PRN  acetaminophen (TYLENOL) tablet 650 mg, Q4H PRN        Review of Systems:   14 point ROS obtained but were negative except mentioned in HPI      Physical exam:     Vitals:  /78   Pulse 60   Temp 98.5 °F (36.9 °C) (Oral)   Resp 16   Ht 5' 11\" (1.803 m)   Wt 207 lb 3.2 oz (94 kg)   SpO2 92%   BMI 28.90 kg/m²   Constitutional:  OAA X2 (self, place), NAD  Skin: noted bilat leg chronic skin venous changes, multiple eczematous spots  Neck: no jvd noted, limited ROM  Cardiovascular:  S1, S2 no r/g, 2/6 holosystolic murmur in LUSB  Respiratory: CTA B without w/r/r  Abdomen:  +bs, soft, nt, nd  Ext: chornic lower extremity edema up to knees  Psychiatric: mood and affect appropriate, occasional tangent speech. Musculoskeletal:  Limited spastic ROM, reduced muscle bulk.     Data:   Labs:  CBC:   Recent Labs     02/10/20  0436 02/11/20  0602 02/12/20  0542   WBC 7.3 5.2 5.5   HGB 9.9* 9.8* 9.8*    135 120*     BMP:    Recent Labs     02/10/20  0436 02/11/20  0603 02/12/20  0542    138 138   K 5.0 4.3 4.2    102 102   CO2 23 22 22   BUN 22* 20 19   CREATININE 1.1 1.0 1.0   GLUCOSE 114* 114* 90     Ca/Mg/Phos:   Recent Labs     02/10/20  0436 02/11/20  0603 02/12/20  0542   CALCIUM 9.1 8.5 8.3   Adjusted Ca+2 today to 8.7 g/dl    Hepatic:   No results for input(s): AST, ALT, ALB, BILITOT, ALKPHOS in the last 72 hours. Troponin: No results for input(s): TROPONINI in the last 72 hours. BNP: No results for input(s): BNP in the last 72 hours. Lipids: No results for input(s): CHOL, TRIG, HDL, LDLCALC, LABVLDL in the last 72 hours. ABGs:   No results for input(s): PHART, PO2ART, HPS8VWS in the last 72 hours. INR: No results for input(s): INR in the last 72 hours. UA:  No results for input(s): Roxine Nunnery, GLUCOSEU, BILIRUBINUR, KETUA, SPECGRAV, BLOODU, PHUR, PROTEINU, UROBILINOGEN, NITRU, LEUKOCYTESUR, Nora Nelson in the last 72 hours. Urine Microscopic:   No results for input(s): LABCAST, BACTERIA, COMU, HYALCAST, WBCUA, RBCUA, EPIU in the last 72 hours. Urine Culture:   No results for input(s): LABURIN in the last 72 hours. Urine Chemistry:   No results for input(s): Aysha Salk, PROTEINUR, NAUR in the last 72 hours. IMAGING:  XR CHEST PORTABLE   Final Result   Cardiomegaly. Small left pleural effusion. Diffuse airspace opacities in    both lungs with interstitial prominence appears slightly improved and    could represent improving pulmonary edema or pneumonia. CT HEAD WO CONTRAST   Final Result      No evidence of acute intracranial abnormality. XR CHEST PORTABLE   Final Result      Perihilar consolidation bilaterally compatible with atypical pneumonia or pulmonary edema. Cardiomegaly status post sternotomy. Silhouetting the left hemidiaphragm may be due to left lower lobe consolidation or portable technique. PA and lateral chest x-ray would be helpful for further evaluation.       VL Extremity Venous Right   Final Result      XR CHEST PORTABLE   Final Result      Unchanged small left pleural effusion and left basilar atelectasis

## 2020-02-13 NOTE — CARE COORDINATION
Center:  Not Applicable      Additional CM Notes: Pt will DC to [facility] via [place] transport [phone] at [facility] [address] son Curtis Diallo called aware of Dc today to facility [phone]     The Plan for Transition of Care is related to the following treatment goals of UTI (urinary tract infection) [N39.0]  UTI (urinary tract infection) [N39.0]    The Patient and/or patient representative Marilyn Blizzard and his family were provided with a choice of provider and agrees with the discharge plan Yes    Freedom of choice list was provided with basic dialogue that supports the patient's individualized plan of care/goals and shares the quality data associated with the providers.  Yes    Care Transitions patient: No    Marely Wood RN  [facility]  Case Management Department  Ph: [phone]  Fax: [phone]

## 2020-02-13 NOTE — PROGRESS NOTES
Progress Note    Admit Date: 2/1/2020  Day: 12  Diet: DIET CARDIAC; Low Potassium  Dietary Nutrition Supplements: Standard High Calorie Oral Supplement  Dietary Nutrition Supplements: Frozen Oral Supplement    CC: Urinary retention    Interval history: VSS WNL stable (T 98.2, /78, HR 93, RR 16 at 03:12 AM). 2.4 L urine output in last 24 hour interval. Patient with confusion and hallucinations yesterday afternoon but no overnight events reported by nursing. Patient appeared to have some confusion this AM but there was no overt evidence of hallucinations. He admits to some abdominal pain this AM, but denies chest pain, SOB, constipation and diarrhea.     Medications:     Scheduled Meds:   mupirocin   Nasal BID    linezolid  600 mg Oral 2 times per day    zolpidem  10 mg Oral Nightly    melatonin  6 mg Oral Nightly    aspirin  81 mg Oral Daily    gabapentin  300 mg Oral TID    sodium bicarbonate  650 mg Oral BID    polyethylene glycol  17 g Oral Daily    senna  2 tablet Oral Daily    Venelex   Topical BID    levothyroxine  50 mcg Oral Daily    heparin (porcine)  5,000 Units Subcutaneous 3 times per day    tamsulosin  0.4 mg Oral Daily    ferrous sulfate  325 mg Oral Daily with breakfast    atorvastatin  80 mg Oral Nightly    isosorbide mononitrate  30 mg Oral Daily    famotidine  20 mg Oral BID    sodium chloride flush  10 mL Intravenous 2 times per day     Continuous Infusions:  PRN Meds:niacin, sodium chloride flush, magnesium hydroxide, ondansetron, acetaminophen    Objective:   Vitals:   T-max:  Patient Vitals for the past 8 hrs:   BP Temp Temp src Pulse Resp SpO2   02/13/20 1123 (!) 109/57 97.7 °F (36.5 °C) Oral 57 14 97 %   02/13/20 0849 (!) 158/74 97.6 °F (36.4 °C) Oral 55 16 94 %       Intake/Output Summary (Last 24 hours) at 2/13/2020 1211  Last data filed at 2/13/2020 0906  Gross per 24 hour   Intake 720 ml   Output 2600 ml   Net -1880 ml     Review of Systems   Constitutional: Negative intact. Gait: Gait is intact. Psychiatric:         Attention and Perception: Attention and perception normal.         Mood and Affect: Affect normal.         Speech: Speech normal.         Behavior: Behavior is cooperative. Cognition and Memory: Cognition normal.       LABS:    CBC:   Recent Labs     02/11/20  0602 02/12/20  0542 02/13/20 0522   WBC 5.2 5.5 4.9   HGB 9.8* 9.8* 8.9*   HCT 29.1* 29.6* 27.6*    120* 89*   MCV 95.8 98.5 101.0*     Renal:    Recent Labs     02/11/20  0603 02/12/20  0542 02/13/20  0522    138 141   K 4.3 4.2 4.0    102 108   CO2 22 22 24   BUN 20 19 21*   CREATININE 1.0 1.0 0.8   GLUCOSE 114* 90 84   CALCIUM 8.5 8.3 8.0*   ANIONGAP 14 14 9     Hepatic: No results for input(s): AST, ALT, BILITOT, BILIDIR, PROT, LABALBU, ALKPHOS in the last 72 hours. Troponin: No results for input(s): TROPONINI in the last 72 hours. BNP: No results for input(s): BNP in the last 72 hours. Lipids: No results for input(s): CHOL, HDL in the last 72 hours. Invalid input(s): LDLCALCU, TRIGLYCERIDE  ABGs:  No results for input(s): PHART, YFY5UGF, PO2ART, PQK2FEY, BEART, THGBART, G8VCOAUU, XHC8VCM in the last 72 hours. INR: No results for input(s): INR in the last 72 hours. Lactate: No results for input(s): LACTATE in the last 72 hours. Cultures:  -----------------------------------------------------------------  RAD:   XR CHEST PORTABLE   Final Result   Cardiomegaly. Small left pleural effusion. Diffuse airspace opacities in    both lungs with interstitial prominence appears slightly improved and    could represent improving pulmonary edema or pneumonia. CT HEAD WO CONTRAST   Final Result      No evidence of acute intracranial abnormality. XR CHEST PORTABLE   Final Result      Perihilar consolidation bilaterally compatible with atypical pneumonia or pulmonary edema. Cardiomegaly status post sternotomy.       Silhouetting the left hemidiaphragm may be due to left lower lobe consolidation or portable technique. PA and lateral chest x-ray would be helpful for further evaluation. VL Extremity Venous Right   Final Result      XR CHEST PORTABLE   Final Result      Unchanged small left pleural effusion and left basilar atelectasis versus pneumonia. XR CHEST STANDARD (2 VW)   Final Result      Left persistent pleural-based consolidation and pleural effusion. Slightly increased compared to prior study      Prior sternotomy. Right lung remains clear. Assessment/Plan:     77M w/ PMH CAD s/p CABG, BPH, GERD, back pain, A-fib, CKD, venous stasis B/L LE wounds, and HLD presented on 02/01/20 w/ urinary retention after being unable to replace schneider.      1. Complicated UTI 2/2 chronic indwelling catheter  - Hx of multiple UTIs 2/2 chronic schneider (MDRO, carbapenem/zosyn sensitive). - UA (02/01/20): Leukocyte (+) / Nitrite (+). WBC . Pseudomonas (+). Meropenem-sensitive. - UA (02/05/20): Culture growing Candida.  - Plan: Linezolid 600 mg PO BID. Continue Linezolid through 02/14 (monitor platelets). Plan for d/c tomorrow pending pre-cert.     2. PNA  - CXR (02/07/20): Perihilar consolidation bilaterally. LLL consolidation.  - CXR (02/08/20): Small left pleural effusion. Diffuse interstitial opacities bilaterally. - MRSA nasal probe (+) (02/07/20). - Pro-bill 0.82 (02/07) > 0.56 (02/09). - Plan: Antibiotic management as above.      3. Toxic encephalopathy  - May be 2/2 to gabapentin and/or baclofen withdrawal.  - Hallucinations appear improved this AM.  - Plan: Continue to monitor.     4. Somnolence  - Associated w/ insomnia, irregular sleep pattern. - Etiology unclear. Suspect mediation-associated (baclofen, gabapentin). - Baclofen pump rate decreased 43.4 mcg/hr to 0.5 mcg/hr on 02/07. - Baclofen pump rate increased to 2.5 mcg/hr (02/13). - Plan: Melatonin 6 mg qPM, Ambien 10 mg qPM. Minimize other sedating medications.  May

## 2020-02-13 NOTE — CARE COORDINATION
CM following, re-submitted to [employer] for prior auth to DC to [facility]. Pending precert at this time for DC.   Electronically signed by Napoleon Brewer RN on 2/13/2020 at 12:01 PM  [phone]

## 2020-02-13 NOTE — FLOWSHEET NOTE
02/13/20 1543   Encounter Summary   Services provided to: Patient not available   Referral/Consult From: Rounding   Continue Visiting   (2/13, grace )   Complexity of Encounter Low   Length of Encounter 15 minutes   Routine   Type Follow up   Assessment Sleeping   Staff Marco, MA

## 2020-02-14 NOTE — PROGRESS NOTES
(1.803 m)   Wt 207 lb 3.2 oz (94 kg)   SpO2 94%   BMI 28.90 kg/m²   Constitutional:  OAA X2 (self, place), NAD  Skin: noted bilat leg chronic skin venous changes, multiple eczematous spots  Neck: no jvd noted, limited ROM  Cardiovascular:  S1, S2 no r/g, 2/6 holosystolic murmur in LUSB  Respiratory: CTA B without w/r/r  Abdomen:  +bs, soft, nt, nd  Ext: chornic lower extremity edema up to knees  Psychiatric: mood and affect appropriate, occasional tangent speech. Musculoskeletal:  Limited spastic ROM, reduced muscle bulk. Data:   Labs:  CBC:   Recent Labs     02/11/20  0602 02/12/20  0542 02/13/20  0522   WBC 5.2 5.5 4.9   HGB 9.8* 9.8* 8.9*    120* 89*     BMP:    Recent Labs     02/11/20  0603 02/12/20  0542 02/13/20  0522    138 141   K 4.3 4.2 4.0    102 108   CO2 22 22 24   BUN 20 19 21*   CREATININE 1.0 1.0 0.8   GLUCOSE 114* 90 84     Ca/Mg/Phos:   Recent Labs     02/11/20  0603 02/12/20  0542 02/13/20  0522   CALCIUM 8.5 8.3 8.0*   Adjusted Ca+2 today to 8.7 g/dl    Hepatic:   No results for input(s): AST, ALT, ALB, BILITOT, ALKPHOS in the last 72 hours. Troponin: No results for input(s): TROPONINI in the last 72 hours. BNP: No results for input(s): BNP in the last 72 hours. Lipids: No results for input(s): CHOL, TRIG, HDL, LDLCALC, LABVLDL in the last 72 hours. ABGs:   No results for input(s): PHART, PO2ART, [id number] in the last 72 hours. INR: No results for input(s): INR in the last 72 hours. UA:  No results for input(s): Lorretta Glimpse, GLUCOSEU, BILIRUBINUR, KETUA, SPECGRAV, BLOODU, PHUR, PROTEINU, UROBILINOGEN, NITRU, LEUKOCYTESUR, [place] in the last 72 hours. Urine Microscopic:   No results for input(s): LABCAST, BACTERIA, COMU, HYALCAST, WBCUA, RBCUA, EPIU in the last 72 hours. Urine Culture:   No results for input(s): LABURIN in the last 72 hours.   Urine Chemistry:   No results for input(s): Surya Ariza, Jenifer Estrada in the last 72 hours.          IMAGING:  XR CHEST PORTABLE   Final Result   Cardiomegaly. Small left pleural effusion. Diffuse airspace opacities in    both lungs with interstitial prominence appears slightly improved and    could represent improving pulmonary edema or pneumonia. CT HEAD WO CONTRAST   Final Result      No evidence of acute intracranial abnormality. XR CHEST PORTABLE   Final Result      Perihilar consolidation bilaterally compatible with atypical pneumonia or pulmonary edema. Cardiomegaly status post sternotomy. Silhouetting the left hemidiaphragm may be due to left lower lobe consolidation or portable technique. PA and lateral chest x-ray would be helpful for further evaluation. VL Extremity Venous Right   Final Result      XR CHEST PORTABLE   Final Result      Unchanged small left pleural effusion and left basilar atelectasis versus pneumonia. XR CHEST STANDARD (2 VW)   Final Result      Left persistent pleural-based consolidation and pleural effusion. Slightly increased compared to prior study      Prior sternotomy. Right lung remains clear. Assessment/Plan   1. ALIS:     2. Hyperkalemia:  RTA 4     3. Acid- base/ Electrolyte imbalance: RTA 4      4. Normocytic anemia:     5.  HTN:     PLAN:  - Cont  oral bicarb   - Low K diet   - Monitor K   - Cr stable   - abx   - cont wyatt     Thank you for allowing us to participate in care of Charlee Carlisle MD

## 2020-02-16 LAB
BLOOD BANK DISPENSE STATUS: NORMAL
BLOOD BANK PRODUCT CODE: NORMAL
BPU ID: NORMAL
DESCRIPTION BLOOD BANK: NORMAL

## 2020-03-02 PROBLEM — N39.0 UTI (URINARY TRACT INFECTION): Status: RESOLVED | Noted: 2020-02-01 | Resolved: 2020-03-02

## 2020-03-17 ENCOUNTER — HOSPITAL ENCOUNTER (INPATIENT)
Age: 80
LOS: 3 days | Discharge: HOME HEALTH | DRG: 683 | End: 2020-03-20
Attending: EMERGENCY MEDICINE | Admitting: HOSPITALIST
Payer: MEDICARE

## 2020-03-17 PROBLEM — N17.9 ACUTE RENAL INJURY (HCC): Status: ACTIVE | Noted: 2020-03-17

## 2020-03-17 LAB
A/G RATIO: 0.8 (ref 1.1–2.2)
ALBUMIN SERPL-MCNC: 3.6 G/DL (ref 3.4–5)
ALP BLD-CCNC: 323 U/L (ref 40–129)
ALT SERPL-CCNC: 10 U/L (ref 10–40)
ANION GAP SERPL CALCULATED.3IONS-SCNC: 12 MMOL/L (ref 3–16)
AST SERPL-CCNC: 33 U/L (ref 15–37)
BACTERIA: ABNORMAL /HPF
BASOPHILS ABSOLUTE: 0 K/UL (ref 0–0.2)
BASOPHILS RELATIVE PERCENT: 0.2 %
BILIRUB SERPL-MCNC: 0.4 MG/DL (ref 0–1)
BILIRUBIN URINE: NEGATIVE
BLOOD, URINE: ABNORMAL
BUN BLDV-MCNC: 32 MG/DL (ref 7–20)
CALCIUM SERPL-MCNC: 9.4 MG/DL (ref 8.3–10.6)
CHLORIDE BLD-SCNC: 104 MMOL/L (ref 99–110)
CLARITY: ABNORMAL
CO2: 21 MMOL/L (ref 21–32)
COLOR: YELLOW
CREAT SERPL-MCNC: 1.9 MG/DL (ref 0.8–1.3)
EOSINOPHILS ABSOLUTE: 0.1 K/UL (ref 0–0.6)
EOSINOPHILS RELATIVE PERCENT: 0.5 %
GFR AFRICAN AMERICAN: 42
GFR NON-AFRICAN AMERICAN: 34
GLOBULIN: 4.7 G/DL
GLUCOSE BLD-MCNC: 123 MG/DL (ref 70–99)
GLUCOSE URINE: NEGATIVE MG/DL
HCT VFR BLD CALC: 42.2 % (ref 40.5–52.5)
HEMOGLOBIN: 13 G/DL (ref 13.5–17.5)
KETONES, URINE: NEGATIVE MG/DL
LEUKOCYTE ESTERASE, URINE: ABNORMAL
LIPASE: 13 U/L (ref 13–60)
LYMPHOCYTES ABSOLUTE: 1 K/UL (ref 1–5.1)
LYMPHOCYTES RELATIVE PERCENT: 7.6 %
MCH RBC QN AUTO: 30.6 PG (ref 26–34)
MCHC RBC AUTO-ENTMCNC: 30.7 G/DL (ref 31–36)
MCV RBC AUTO: 99.5 FL (ref 80–100)
MICROSCOPIC EXAMINATION: YES
MONOCYTES ABSOLUTE: 1.1 K/UL (ref 0–1.3)
MONOCYTES RELATIVE PERCENT: 8.4 %
MUCUS: ABNORMAL /LPF
NEUTROPHILS ABSOLUTE: 11.1 K/UL (ref 1.7–7.7)
NEUTROPHILS RELATIVE PERCENT: 83.3 %
NITRITE, URINE: NEGATIVE
PDW BLD-RTO: 16.1 % (ref 12.4–15.4)
PH UA: 7 (ref 5–8)
PLATELET # BLD: 141 K/UL (ref 135–450)
PMV BLD AUTO: 10.3 FL (ref 5–10.5)
POTASSIUM REFLEX MAGNESIUM: 6.2 MMOL/L (ref 3.5–5.1)
POTASSIUM SERPL-SCNC: 5.3 MMOL/L (ref 3.5–5.1)
PRO-BNP: 2617 PG/ML (ref 0–449)
PROTEIN UA: >=300 MG/DL
RBC # BLD: 4.24 M/UL (ref 4.2–5.9)
RBC UA: ABNORMAL /HPF (ref 0–4)
REASON FOR REJECTION: NORMAL
REJECTED TEST: NORMAL
SODIUM BLD-SCNC: 137 MMOL/L (ref 136–145)
SPECIFIC GRAVITY UA: 1.02 (ref 1–1.03)
TOTAL PROTEIN: 8.3 G/DL (ref 6.4–8.2)
URINE REFLEX TO CULTURE: YES
URINE TYPE: ABNORMAL
UROBILINOGEN, URINE: 0.2 E.U./DL
WBC # BLD: 13.3 K/UL (ref 4–11)
WBC UA: >100 /HPF (ref 0–5)

## 2020-03-17 PROCEDURE — 83690 ASSAY OF LIPASE: CPT

## 2020-03-17 PROCEDURE — 2580000003 HC RX 258: Performed by: HOSPITALIST

## 2020-03-17 PROCEDURE — 87086 URINE CULTURE/COLONY COUNT: CPT

## 2020-03-17 PROCEDURE — 80053 COMPREHEN METABOLIC PANEL: CPT

## 2020-03-17 PROCEDURE — 6370000000 HC RX 637 (ALT 250 FOR IP): Performed by: HOSPITALIST

## 2020-03-17 PROCEDURE — 84443 ASSAY THYROID STIM HORMONE: CPT

## 2020-03-17 PROCEDURE — 6360000002 HC RX W HCPCS: Performed by: HOSPITALIST

## 2020-03-17 PROCEDURE — 96365 THER/PROPH/DIAG IV INF INIT: CPT

## 2020-03-17 PROCEDURE — 36415 COLL VENOUS BLD VENIPUNCTURE: CPT

## 2020-03-17 PROCEDURE — 96372 THER/PROPH/DIAG INJ SC/IM: CPT

## 2020-03-17 PROCEDURE — 85025 COMPLETE CBC W/AUTO DIFF WBC: CPT

## 2020-03-17 PROCEDURE — 84132 ASSAY OF SERUM POTASSIUM: CPT

## 2020-03-17 PROCEDURE — 2580000003 HC RX 258: Performed by: EMERGENCY MEDICINE

## 2020-03-17 PROCEDURE — 81001 URINALYSIS AUTO W/SCOPE: CPT

## 2020-03-17 PROCEDURE — 83880 ASSAY OF NATRIURETIC PEPTIDE: CPT

## 2020-03-17 PROCEDURE — 99284 EMERGENCY DEPT VISIT MOD MDM: CPT

## 2020-03-17 PROCEDURE — 96361 HYDRATE IV INFUSION ADD-ON: CPT

## 2020-03-17 PROCEDURE — 1200000000 HC SEMI PRIVATE

## 2020-03-17 RX ORDER — ATORVASTATIN CALCIUM 40 MG/1
80 TABLET, FILM COATED ORAL NIGHTLY
Status: DISCONTINUED | OUTPATIENT
Start: 2020-03-17 | End: 2020-03-20 | Stop reason: HOSPADM

## 2020-03-17 RX ORDER — PROMETHAZINE HYDROCHLORIDE 12.5 MG/1
12.5 TABLET ORAL EVERY 6 HOURS PRN
Status: DISCONTINUED | OUTPATIENT
Start: 2020-03-17 | End: 2020-03-20 | Stop reason: HOSPADM

## 2020-03-17 RX ORDER — 0.9 % SODIUM CHLORIDE 0.9 %
1000 INTRAVENOUS SOLUTION INTRAVENOUS ONCE
Status: COMPLETED | OUTPATIENT
Start: 2020-03-17 | End: 2020-03-17

## 2020-03-17 RX ORDER — FAMOTIDINE 20 MG/1
20 TABLET, FILM COATED ORAL DAILY
Status: DISCONTINUED | OUTPATIENT
Start: 2020-03-17 | End: 2020-03-20 | Stop reason: HOSPADM

## 2020-03-17 RX ORDER — ACETAMINOPHEN 325 MG/1
650 TABLET ORAL EVERY 6 HOURS PRN
Status: DISCONTINUED | OUTPATIENT
Start: 2020-03-17 | End: 2020-03-20 | Stop reason: HOSPADM

## 2020-03-17 RX ORDER — SODIUM CHLORIDE 0.9 % (FLUSH) 0.9 %
10 SYRINGE (ML) INJECTION EVERY 12 HOURS SCHEDULED
Status: DISCONTINUED | OUTPATIENT
Start: 2020-03-17 | End: 2020-03-20 | Stop reason: HOSPADM

## 2020-03-17 RX ORDER — NIACIN 500 MG/1
500 TABLET, EXTENDED RELEASE ORAL NIGHTLY
Status: DISCONTINUED | OUTPATIENT
Start: 2020-03-17 | End: 2020-03-20 | Stop reason: HOSPADM

## 2020-03-17 RX ORDER — GABAPENTIN 600 MG/1
600 TABLET ORAL NIGHTLY
Status: ON HOLD | COMMUNITY
End: 2020-05-05 | Stop reason: HOSPADM

## 2020-03-17 RX ORDER — LEVOTHYROXINE SODIUM 0.05 MG/1
50 TABLET ORAL DAILY
Status: ON HOLD | COMMUNITY
End: 2020-08-18

## 2020-03-17 RX ORDER — POLYETHYLENE GLYCOL 3350 17 G/17G
17 POWDER, FOR SOLUTION ORAL DAILY PRN
Status: DISCONTINUED | OUTPATIENT
Start: 2020-03-17 | End: 2020-03-20 | Stop reason: HOSPADM

## 2020-03-17 RX ORDER — ISOSORBIDE MONONITRATE 30 MG/1
30 TABLET, EXTENDED RELEASE ORAL DAILY
Status: DISCONTINUED | OUTPATIENT
Start: 2020-03-17 | End: 2020-03-20 | Stop reason: HOSPADM

## 2020-03-17 RX ORDER — FERROUS SULFATE 325(65) MG
325 TABLET ORAL
Status: DISCONTINUED | OUTPATIENT
Start: 2020-03-18 | End: 2020-03-20 | Stop reason: HOSPADM

## 2020-03-17 RX ORDER — ASPIRIN 325 MG
325 TABLET ORAL NIGHTLY
Status: DISCONTINUED | OUTPATIENT
Start: 2020-03-17 | End: 2020-03-20 | Stop reason: HOSPADM

## 2020-03-17 RX ORDER — SODIUM CHLORIDE 9 MG/ML
INJECTION, SOLUTION INTRAVENOUS CONTINUOUS
Status: DISCONTINUED | OUTPATIENT
Start: 2020-03-17 | End: 2020-03-18

## 2020-03-17 RX ORDER — UREA 10 %
10 LOTION (ML) TOPICAL NIGHTLY
Status: DISCONTINUED | OUTPATIENT
Start: 2020-03-17 | End: 2020-03-20 | Stop reason: HOSPADM

## 2020-03-17 RX ORDER — ZOLPIDEM TARTRATE 5 MG/1
10 TABLET ORAL NIGHTLY
Status: DISCONTINUED | OUTPATIENT
Start: 2020-03-17 | End: 2020-03-20 | Stop reason: HOSPADM

## 2020-03-17 RX ORDER — SODIUM CHLORIDE 0.9 % (FLUSH) 0.9 %
10 SYRINGE (ML) INJECTION PRN
Status: DISCONTINUED | OUTPATIENT
Start: 2020-03-17 | End: 2020-03-20 | Stop reason: HOSPADM

## 2020-03-17 RX ORDER — ACETAMINOPHEN 650 MG/1
650 SUPPOSITORY RECTAL EVERY 6 HOURS PRN
Status: DISCONTINUED | OUTPATIENT
Start: 2020-03-17 | End: 2020-03-20 | Stop reason: HOSPADM

## 2020-03-17 RX ORDER — TAMSULOSIN HYDROCHLORIDE 0.4 MG/1
0.4 CAPSULE ORAL DAILY
Status: DISCONTINUED | OUTPATIENT
Start: 2020-03-17 | End: 2020-03-20 | Stop reason: HOSPADM

## 2020-03-17 RX ORDER — ONDANSETRON 2 MG/ML
4 INJECTION INTRAMUSCULAR; INTRAVENOUS EVERY 6 HOURS PRN
Status: DISCONTINUED | OUTPATIENT
Start: 2020-03-17 | End: 2020-03-20 | Stop reason: HOSPADM

## 2020-03-17 RX ORDER — LEVOTHYROXINE SODIUM 0.05 MG/1
50 TABLET ORAL DAILY
Status: DISCONTINUED | OUTPATIENT
Start: 2020-03-18 | End: 2020-03-20 | Stop reason: HOSPADM

## 2020-03-17 RX ORDER — TAMSULOSIN HYDROCHLORIDE 0.4 MG/1
0.8 CAPSULE ORAL DAILY
COMMUNITY

## 2020-03-17 RX ADMIN — Medication 10 MG: at 22:44

## 2020-03-17 RX ADMIN — Medication 10 ML: at 22:47

## 2020-03-17 RX ADMIN — NIACIN 500 MG: 500 TABLET, EXTENDED RELEASE ORAL at 22:58

## 2020-03-17 RX ADMIN — ISOSORBIDE MONONITRATE 30 MG: 30 TABLET, EXTENDED RELEASE ORAL at 22:45

## 2020-03-17 RX ADMIN — TAMSULOSIN HYDROCHLORIDE 0.4 MG: 0.4 CAPSULE ORAL at 22:45

## 2020-03-17 RX ADMIN — ENOXAPARIN SODIUM 40 MG: 40 INJECTION SUBCUTANEOUS at 22:59

## 2020-03-17 RX ADMIN — CEFTRIAXONE 1 G: 1 INJECTION, POWDER, FOR SOLUTION INTRAMUSCULAR; INTRAVENOUS at 22:46

## 2020-03-17 RX ADMIN — SODIUM CHLORIDE: 9 INJECTION, SOLUTION INTRAVENOUS at 22:46

## 2020-03-17 RX ADMIN — FAMOTIDINE 20 MG: 20 TABLET, FILM COATED ORAL at 22:44

## 2020-03-17 RX ADMIN — ZOLPIDEM TARTRATE 10 MG: 5 TABLET ORAL at 22:45

## 2020-03-17 RX ADMIN — SODIUM CHLORIDE 1000 ML: 9 INJECTION, SOLUTION INTRAVENOUS at 16:23

## 2020-03-17 RX ADMIN — ATORVASTATIN CALCIUM 80 MG: 40 TABLET, FILM COATED ORAL at 22:45

## 2020-03-17 RX ADMIN — ASPIRIN 325 MG ORAL TABLET 325 MG: 325 PILL ORAL at 22:45

## 2020-03-17 ASSESSMENT — PAIN DESCRIPTION - ORIENTATION: ORIENTATION: LOWER

## 2020-03-17 ASSESSMENT — PAIN DESCRIPTION - FREQUENCY: FREQUENCY: CONTINUOUS

## 2020-03-17 ASSESSMENT — ENCOUNTER SYMPTOMS
EYE PAIN: 0
BACK PAIN: 1
COUGH: 0
WHEEZING: 0
BLOOD IN STOOL: 0

## 2020-03-17 ASSESSMENT — PAIN DESCRIPTION - DESCRIPTORS: DESCRIPTORS: BURNING;PRESSURE

## 2020-03-17 ASSESSMENT — PAIN SCALES - GENERAL: PAINLEVEL_OUTOF10: 4

## 2020-03-17 ASSESSMENT — PAIN DESCRIPTION - LOCATION: LOCATION: ABDOMEN

## 2020-03-17 NOTE — H&P
Hospital Medicine History & Physical      PCP: INES BLANDON [id number] [address] [address]    Date of Admission: 3/17/2020    Date of Service: Pt seen/examined on 3/17/2020 and Admitted to Inpatient with expected LOS greater than two midnights due to medical therapy. Chief Complaint: Pain with a Iglesias catheter      History Of Present Illness:      67 y.o. male who presented to [facility] emergency room with a chief complaint of Iglesias pain patient with a history of chronic indwelling Iglesias catheter since January 2019 due to urinary retention followed by urologist presented with discomfort with a Iglesias, Iglesias was changed in the emergency room denies any fever chills no chest pain no shortness of breath no cough no sputum no nausea vomiting no diarrhea.   He is wheelchair-bound lives home alone in the emergency room patient was noted to have acute kidney injury, hyperkalemia embolizing specimen, repeat potassium 5.3    Past Medical History:          Diagnosis Date    BPH (benign prostatic hyperplasia)     Carotid stenosis 12/2013    JESU 49-50% stenosis; LICA 43-43% stenosis    Cellulitis 12/2013    LLE    Chronic back pain     Diverticular disease     Erectile dysfunction     GERD (gastroesophageal reflux disease)     History of atrial fibrillation     Hypercholesteremia     Hypertension     Lower GI bleed     MDRO (multiple drug resistant organisms) resistance 10/26/2019    urine    Neuromuscular disorder (HCC)     spasticity    Renal insufficiency     Risk for falls     uses walker    Tinea corporis 12/2013    LLE    Vitamin B12 deficiency        Past Surgical History:          Procedure Laterality Date    BACK SURGERY  2006    lower lumbar    CORONARY ARTERY BYPASS GRAFT  12/13/2013    CABG x 5 (Dr Sinan Zamudio)   [facility] N/A 1/10/2019    CYSTOSCOPY performed by Matt Winslow MD at [place] [id number] Right 5/1/2015    [facility]       Medications Prior to Admission:      Prior to Pertinent positives as noted in the HPI. All other systems reviewed and negative. PHYSICAL EXAM PERFORMED:    /61   Pulse 97   Temp 97.8 °F (36.6 °C) (Oral)   Resp 20   SpO2 97%     General appearance:  No apparent distress, appears stated age and cooperative. HEENT:  Normal cephalic, atraumatic without obvious deformity. Pupils equal, round, and reactive to light. Extra ocular muscles intact. Conjunctivae/corneas clear. Neck: Supple, with full range of motion. No jugular venous distention. Trachea midline. Respiratory:  Normal respiratory effort. Clear to auscultation, bilaterally without Rales/Wheezes/Rhonchi. Cardiovascular:  Regular rate and rhythm with normal S1/S2 without murmurs, rubs or gallops. Abdomen: Soft, non-tender, non-distended with normal bowel sounds. Musculoskeletal:  No clubbing, cyanosis, 1+ edema bilaterally lower extremity. Full range of motion without deformity. Skin: Skin color, texture, turgor normal.  No rashes or lesions. Neurologic:  Neurovascularly intact without any focal sensory/motor deficits. Cranial nerves: II-XII intact, grossly non-focal.  Psychiatric:  Alert and oriented, thought content appropriate, normal insight  Capillary Refill: Brisk,< 3 seconds   Peripheral Pulses: +2 palpable, equal bilaterally       Labs:     Recent Labs     03/17/20  1726   WBC 13.3*   HGB 13.0*   HCT 42.2        Recent Labs     03/17/20  1626 03/17/20  1726     --    K 6.2* 5.3*     --    CO2 21  --    BUN 32*  --    CREATININE 1.9*  --    CALCIUM 9.4  --      Recent Labs     03/17/20  1626   AST 33   ALT 10   BILITOT 0.4   ALKPHOS 323*     No results for input(s): INR in the last 72 hours. No results for input(s): Jayce Ewing in the last 72 hours.     Urinalysis:      Lab Results   Component Value Date    NITRU Negative 02/07/2020    WBCUA 6-10 02/07/2020    BACTERIA 4+ 02/01/2020    RBCUA 3-5 02/07/2020    BLOODU Negative 02/07/2020    SPECGRAV >=1.030 02/07/2020    GLUCOSEU Negative 02/07/2020       Radiology:     CXR: I have reviewed the CXR with the following interpretation:   EKG:  I have reviewed the EKG with the following interpretation:     No orders to display       ASSESSMENT:    Active Hospital Problems    Diagnosis Date Noted    Acute renal injury ([facility] [address] [N17.9] 03/17/2020   Hyperkalemia  Hypertension  Hypothyroidism  Chronic indwelling Iglesias catheter    PLAN:    1. This 61-year-old male admitted with acute kidney injury will admit patient to [facility] 5 floor continue with IV hydration  discontinue Aldactone due to hyperkalemia and acute kidney injury check daily BMP. 2.  Lower extremity edema check Doppler of lower extremity to rule out DVT. Consult wound care  3. Hypertension continue with home medication monitor blood pressure closely. 4.  Hypothyroidism continue with the Synthroid check TSH level. 5.  Chronic indwelling Iglesias catheter with a discomfort consult urology Iglesias was changed in the emergency room, UTI started on IV Rocephin follow urine culture. 6.  Debility wheelchair-bound consult physical Occupational Therapy. May need a placement      DVT Prophylaxis: Lovenox subcu  Diet: No diet orders on file  Code Status: Prior    PT/OT Eval Status: [address] home       Suresh Floyd MD    Thank you INES Fonseca for the opportunity to be involved in this patient's care. If you have any questions or concerns please feel free to contact me at [phone].

## 2020-03-17 NOTE — ED PROVIDER NOTES
[address] ENCOUNTER          ATTENDING PHYSICIAN NOTE       Date of evaluation: 3/17/2020    Chief Complaint     Dysuria and Abdominal Pain (lower abdominal)    History of Present Illness     Estela Rosario is a 67 y.o. male who presents to the ED via EMS due to concern for UTI. On arrival he is noted to be covered in feces and he states he had a very large bowel movement last night that he tried to clean up on his own. He states he has been having pain in his penis all the time for a couple of days. Denies any fevers, chills, nausea, vomiting. He states he has not been eating well due to difficulty getting food at home and has not eaten anything since yesterday. He has not taken his meds in at least 1 day. He does state he is hungry currently. He denies any cough, shortness of breath, chest pain. He does not have any sore throat or rhinorrhea. He has not traveled recently or been in contact with sick people he knows of. He currently lives on the first floor of a house and his daughter lives on the second floor; however, he states she works a lot and is able to check on him only once a day or so. He does report he has home care that comes out 3 times a week currently. Of note, EMS states the condition of the house was \"deplorable\" and they plan to call social work due to increasing concerns for this gentlemen's welfare/wellbeing. Review of Systems     Review of Systems   Constitutional: Positive for activity change (decreased). Negative for chills and fever. HENT: Negative for congestion. Eyes: Negative for pain. Respiratory: Negative for cough and wheezing. Cardiovascular: Positive for leg swelling. Negative for palpitations. Gastrointestinal: Negative for blood in stool. Genitourinary: Positive for difficulty urinating (chronic, schneider in place, has had discussions with urology regarding suprapubic catheter).    Musculoskeletal: Positive for back pain (chronic low back). Neurological: Positive for weakness (generalized). Negative for dizziness. Hematological: Bruises/bleeds easily. All other systems reviewed and are negative. Past Medical, Surgical, Family, and Social History     He has a past medical history of BPH (benign prostatic hyperplasia), Carotid stenosis, Cellulitis, Chronic back pain, Diverticular disease, Erectile dysfunction, GERD (gastroesophageal reflux disease), History of atrial fibrillation, Hypercholesteremia, Hypertension, Lower GI bleed, MDRO (multiple drug resistant organisms) resistance, Neuromuscular disorder ([facility] [address] Renal insufficiency, Risk for falls, Tinea corporis, and Vitamin B12 deficiency. 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 His family history includes Heart Disease in his father. He reports that he quit smoking about 50 years ago. He has never used smokeless tobacco. He reports that he does not drink alcohol or use drugs. Medications     Previous Medications    ASPIRIN 325 MG TABLET    Take 325 mg by mouth nightly     ATORVASTATIN (LIPITOR) 80 MG TABLET    Take 80 mg by mouth nightly. FERROUS SULFATE 325 (65 FE) MG TABLET    Take 325 mg by mouth daily (with breakfast)    GABAPENTIN (NEURONTIN) 600 MG TABLET    Take 0.5 tablets by mouth 3 times daily for 30 days.     ISOSORBIDE MONONITRATE (IMDUR) 30 MG EXTENDED RELEASE TABLET    Take 1 tablet by mouth daily    LEVOTHYROXINE (SYNTHROID) 50 MCG TABLET    Take 1 tablet by mouth Daily    MELATONIN 10 MG TABS    Take 10 mg by mouth nightly     NIACIN 500 MG TABLET    Take 1 tablet by mouth daily as needed (for spasticity)     RANITIDINE (ZANTAC) 300 MG TABLET    Take 300 mg by mouth nightly As needed    SPIRONOLACTONE (ALDACTONE) 25 MG TABLET    Take 25 mg by mouth 2 times daily    TAMSULOSIN (FLOMAX) 0.4 MG CAPSULE Take 1 capsule by mouth daily    ZOLPIDEM (AMBIEN) 10 MG TABLET    Take 10 mg by mouth nightly. Allergies     He is allergic to bactrim [sulfamethoxazole-trimethoprim]. Physical Exam     INITIAL VITALS: BP: (!) 158/71, Temp: 97.8 °F (36.6 °C), Pulse: 97, Resp: 20, SpO2: 97 %   Physical Exam  Vitals signs reviewed. Exam conducted with a chaperone present. Constitutional:       General: He is not in acute distress. Appearance: He is ill-appearing (chronic). He is not toxic-appearing. Comments: Elderly white male who appears very frail. Arrives via EMS covered in feces. Pleasant and cooperative with exam, answers questions appropriately though seems to have lack of insight regarding his current living situation. HENT:      Head: Normocephalic and atraumatic. Right Ear: External ear normal.      Left Ear: External ear normal.      Nose: Nose normal.      Mouth/Throat:      Mouth: Mucous membranes are dry. Eyes:      General: No scleral icterus. Right eye: No discharge. Left eye: No discharge. Extraocular Movements: Extraocular movements intact. Conjunctiva/sclera: Conjunctivae normal.   Neck:      Musculoskeletal: Normal range of motion. Trachea: No tracheal deviation. Cardiovascular:      Rate and Rhythm: Normal rate. Heart sounds: Murmur (systolic 2/6) present. Pulmonary:      Effort: Pulmonary effort is normal. No respiratory distress. Abdominal:      Tenderness: There is no abdominal tenderness. There is no right CVA tenderness, left CVA tenderness or guarding. Comments: Right side abdomen with mass (baclofen pump) overlying skin without redness or warmth   Genitourinary:     Pubic Area: No rash. Scrotum/Testes:         Right: Tenderness not present. Left: Tenderness not present. Rectum: No external hemorrhoid. Comments: Skin area of scrotum macerated and red. No active drainage.  Stage 1/2 pressure ulcer noted to butt area, there are scratches from his nails where he was \"cleaning\" yesterday. Iglesias in place, does not appear to be draining. Musculoskeletal:         General: No tenderness. Right lower leg: Edema present. Left lower leg: Edema present. Comments: Please see images below for how legs were wrapped incorrectly with ACE wraps   Skin:     General: Skin is warm and dry. Capillary Refill: Capillary refill takes more than 3 seconds. Comments: Chronic dermatologic skin changes noted BLE and toenails   Neurological:      General: No focal deficit present. Mental Status: He is alert and oriented to person, place, and time. Cranial Nerves: Cranial nerves are intact. Sensory: Sensation is intact. Gait: Gait abnormal (unable to assess patient states he does not walk at baseline though he was able to stand previously while at [facility]). Comments: Strength equal though generally weak, barely able to lift either leg up off the bed. Psychiatric:         Mood and Affect: Mood normal.         Behavior: Behavior normal.         Judgment: Judgment is inappropriate (questionable).                Diagnostic Results     LABS:   Results for orders placed or performed during the hospital encounter of 03/17/20   Comprehensive Metabolic Panel w/ Reflex to MG   Result Value Ref Range    Sodium 137 136 - 145 mmol/L    Potassium reflex Magnesium 6.2 (HH) 3.5 - 5.1 mmol/L    Chloride 104 99 - 110 mmol/L    CO2 21 21 - 32 mmol/L    Anion Gap 12 3 - 16    Glucose 123 (H) 70 - 99 mg/dL    BUN 32 (H) 7 - 20 mg/dL    CREATININE 1.9 (H) 0.8 - 1.3 mg/dL    GFR Non- 34 (A) >60    GFR  42 (A) >60    Calcium 9.4 8.3 - 10.6 mg/dL    Total Protein 8.3 (H) 6.4 - 8.2 g/dL    Alb 3.6 3.4 - 5.0 g/dL    Albumin/Globulin Ratio 0.8 (L) 1.1 - 2.2    Total Bilirubin 0.4 0.0 - 1.0 mg/dL    Alkaline Phosphatase 323 (H) 40 - 129 U/L    ALT 10 10 - 40 U/L    AST 33 15 - 37 U/L overflow has been coming out around the tip. Per RN he had been covered in feces on arrival that was most concentrated on his butt and scrotal area. A peripheral IV was placed, labs were ordered along with 1 L IV fluids over 2 hours (noted to have an echo in January 2020 which showed ejection fraction 55 to 60% with no regional wall motion abnormalities and normal diastolic function). Given concern regarding his current living situation social work was consulted, please see their note for further details. Lab results returned notable for elevation in white count 13.3, elevated alk phos 323, elevated proBNP 2,617, acute kidney injury creatinine 1.9 (baseline 0.7-0.8). Potassium also mildly elevated 5.3. With normal echo in January of this year his fluid overload on exam and on labs is likely secondary to his acute kidney injury. Discussed results with the patient who was in agreement with admission at this time given his multiple lab abnormalities and need for further help at home. At that time his schneider had been exchanged and it was noted he had a significant amount of sediment and ~500ml of fluid output. His UA/Urine culture results are still pending. We will plan for admission with hospitalist.      Clinical Impression     1. Pressure injury of left buttock, stage 2 ([address]    2. Pressure injury of right buttock, stage 2 ([address]    3. Skin maceration    4. ALIS (acute kidney injury) ([address]    5. Leukocytosis, unspecified type    6. Elevated alkaline phosphatase level    7.  Elevated brain natriuretic peptide (BNP) level        Disposition     DISPOSITION  Admission     Tigre Estrada MD  03/17/20 [id number]

## 2020-03-17 NOTE — ED TRIAGE NOTES
Patient called EMS from home for dysuria and lower abdominal pressure. Has a schneider . Emily De Leon Arrived covered in stool that is dried on. States had a BM last night and tried to clean it up. Scrotum and rich rectal area excoriated and scratched. Has bilat ace wraps to lower extremities,  They are not on properly and feet are huge and edema is unevenly dispersed  Patient also states hasn't eaten or had meds today. .  Patient given a bed bath.    Dr Christina Avery in to see patient

## 2020-03-17 NOTE — CARE COORDINATION
Case Management Assessment           Initial Evaluation                Date / Time of Evaluation: 3/17/2020 4:27 PM                 Assessment Completed by: Rudolph Eaton    Patient Name: Melissa Copeland     YOB: 1940  Diagnosis: No admission diagnoses are documented for this encounter. Date / Time: 3/17/2020  2:54 PM    Patient Admission Status: Inpatient    If patient is discharged prior to next notation, then this note serves as note for discharge by case management. Current PCP: Tim Matt Patient: No    Chart Reviewed: Yes  Patient/ Family Interviewed: Yes    Initial assessment completed at bedside with: patient     Hospitalization in the last 30 days: Yes    Emergency Contacts:  Extended Emergency Contact Information  Primary Emergency Contact: [place] [address] Phone: [phone]  Work Phone: [phone]  Mobile Phone: [phone]  Relation: Child  Secondary Emergency Contact: Dana Callahan  Address: [address]  Home Phone: [phone]  Relation: Other    Advance Directives:   Code Status: Prior    Healthcare Power of : No  Agent: N/A  Contact Number: N/A    Copy present: No     In paper Chart: No    Scanned into EMR No    Financial  Payor: Camilo Perla / Plan: [employer] GOLD PLUS HMO / Product Type: *No Product type* /     Pre-cert required for SNF: Yes    [facility] - Dinh Valerojaylon [phone] - F [phone]  [address] [place] [address]  Phone: [phone] Fax: [phone]    Giorgi Escalante [address] [place] [address] [phone]  [facility] [address]  Phone: [phone] Fax: [phone]      Potential assistance Purchasing Medications:    Does Patient want to participate in local refill/ meds to beds program?:      Meds To Beds General Rules:  1. Can ONLY be done Monday- Friday between 8:30am-5pm  2. Prescription(s) must be in pharmacy by 3pm to be filled same day  3. Copy of patient's insurance/ prescription drug card and patient face sheet must be sent along with the prescription(s)  4. Cost of Rx cannot be added to hospital bill. If financial assistance is needed, please contact unit  or ;  or  CANNOT provide pharmacy voucher for patients co-pays  5. Patients can then  the prescription on their way out of the hospital at discharge, or pharmacy can deliver to the bedside if staff is available. (payment due at time of pick-up or delivery - cash, check, or card accepted)     Able to afford home medications/ co-pay costs: Yes    ADLS  Support Systems:      PT AM-PAC:   /24  OT AM-PAC:   /24    [place]: lives at home alone, daughter lives upstairs.    Steps: unknown     Plans to RETURN to current housing: Yes  Barriers to RETURNING to current housing: safety concerns at home    Lilo Melchor 78  Currently ACTIVE with [facility] [address]: Yes  Home Care Agency: [email or web address]  Phone: [phone]  Fax: [phone]    Currently ACTIVE with [facility] on Aging: Yes  Passport/ Waiver: Yes  Passport/ Waiver Services: [facility] 2 times a week    : Sayda Baires  Phone: [phone]      Durable Medical Equipment  DME Provider: unknown   Equipment: wheelchair, lift chair, walker    Home Oxygen and [facility] prior to admission: No  Tameka [address]: Not Applicable  Other Respiratory Equipment: N/A    Informed of need to bring portable home O2 tank on day of DISCHARGE for nursing to connect prior to leaving: No  Verbalized agreement/Understanding: No  Person to bring portable tank at discharge: N/A    Dialysis  Active with HD/PD prior to admission: No  Nephrologist: [address]:  Not Applicable    DISCHARGE PLAN:  Disposition: Home vs. SNF placement     Transportation PLAN for discharge: family or EMS      Factors facilitating achievement of predicted outcomes: Cooperative, Pleasant, Sense of humor and Knowledge about rehab    Barriers to discharge: Limited family support, Confusion, Limited safety awareness and Unrealistic expectations    Additional Case Management Notes:     Student and ED SW met with pt to address concerns regarding safety at home. ED SW initially spoke with Moses Villalpando from [employer] who says that [facility] found pt in his own stool and the house was unkept with stool covering the wall and bed. SW was also told that [facility] is quite familiar with this pt, as they've visited the home 26 times within the past three years. SW was told that the squad has noticed a severe decline in the pt's well being and the home itself within this time. SW also spoke with ED RN who also had concerns regarding the safety of the home as well as the swelling and deformities around the pt's calves. SW spoke with [facility] and addressed hospital concerns of the extreme swelling and deformities on pt's legs. [employer] representative was receptive of information and reports that she will address this information with upper management. [facility] reports their SW has met with patient and is attempting to work with [facility] on increasing patient's home health aides to daily. They note the house as unkept and dirty. ED SW and student visited pt to address concerns. Pt confirmed that he recently left [facility] on 3/5 and returned home shortly after. Pt reports that his daughter lives above him in the home, although she is not able to provide as much care because of her work schedule. Pt reports that he receives services from [facility]: PT/OT/RN a couple times a week, as well as [facility] services. Pt tells us that he has had difficulty establishing services through [facility] because his CM has not been returning his calls. [facility] CM called and message left.     Gaby Austin asked pt if he is open to SNF placement at [facility] if it is recommended. Pt reports he would prefer to return home at this time. MD and RN updated. The Plan for Transition of Care is related to the following treatment goals of No admission diagnoses are documented for this encounter. The Patient and/or patient representative Dave Booth and his family were provided with a choice of provider and agrees with the discharge plan Yes    Freedom of choice list was provided with basic dialogue that supports the patient's individualized plan of care/goals and shares the quality data associated with the providers.  Yes      Care Transition patient: No    Estrella Armenta   Social Work Student   Phone: [phone] [facility]) [phone]

## 2020-03-18 ENCOUNTER — APPOINTMENT (OUTPATIENT)
Dept: VASCULAR LAB | Age: 80
DRG: 683 | End: 2020-03-18
Payer: MEDICARE

## 2020-03-18 LAB
ALBUMIN SERPL-MCNC: 2.8 G/DL (ref 3.4–5)
ALP BLD-CCNC: 230 U/L (ref 40–129)
ALT SERPL-CCNC: 8 U/L (ref 10–40)
ANION GAP SERPL CALCULATED.3IONS-SCNC: 12 MMOL/L (ref 3–16)
AST SERPL-CCNC: 13 U/L (ref 15–37)
BILIRUB SERPL-MCNC: 0.4 MG/DL (ref 0–1)
BILIRUBIN DIRECT: <0.2 MG/DL (ref 0–0.3)
BILIRUBIN, INDIRECT: ABNORMAL MG/DL (ref 0–1)
BUN BLDV-MCNC: 33 MG/DL (ref 7–20)
CALCIUM SERPL-MCNC: 8.2 MG/DL (ref 8.3–10.6)
CHLORIDE BLD-SCNC: 107 MMOL/L (ref 99–110)
CO2: 19 MMOL/L (ref 21–32)
CREAT SERPL-MCNC: 1.7 MG/DL (ref 0.8–1.3)
CREATININE URINE: 59 MG/DL (ref 39–259)
GFR AFRICAN AMERICAN: 47
GFR NON-AFRICAN AMERICAN: 39
GLUCOSE BLD-MCNC: 97 MG/DL (ref 70–99)
HCT VFR BLD CALC: 33.8 % (ref 40.5–52.5)
HEMOGLOBIN: 10.9 G/DL (ref 13.5–17.5)
MCH RBC QN AUTO: 31.4 PG (ref 26–34)
MCHC RBC AUTO-ENTMCNC: 32.2 G/DL (ref 31–36)
MCV RBC AUTO: 97.4 FL (ref 80–100)
PDW BLD-RTO: 16.2 % (ref 12.4–15.4)
PLATELET # BLD: 144 K/UL (ref 135–450)
PMV BLD AUTO: 10.3 FL (ref 5–10.5)
POTASSIUM REFLEX MAGNESIUM: 4.9 MMOL/L (ref 3.5–5.1)
POTASSIUM, UR: 15.8 MMOL/L
RBC # BLD: 3.46 M/UL (ref 4.2–5.9)
SODIUM BLD-SCNC: 138 MMOL/L (ref 136–145)
SODIUM URINE: 45 MMOL/L
TOTAL PROTEIN: 6.3 G/DL (ref 6.4–8.2)
TSH REFLEX: 2.73 UIU/ML (ref 0.27–4.2)
URINE CULTURE, ROUTINE: NORMAL
WBC # BLD: 8.2 K/UL (ref 4–11)

## 2020-03-18 PROCEDURE — 97530 THERAPEUTIC ACTIVITIES: CPT

## 2020-03-18 PROCEDURE — 1200000000 HC SEMI PRIVATE

## 2020-03-18 PROCEDURE — 6370000000 HC RX 637 (ALT 250 FOR IP): Performed by: INTERNAL MEDICINE

## 2020-03-18 PROCEDURE — 80048 BASIC METABOLIC PNL TOTAL CA: CPT

## 2020-03-18 PROCEDURE — 80076 HEPATIC FUNCTION PANEL: CPT

## 2020-03-18 PROCEDURE — 93970 EXTREMITY STUDY: CPT

## 2020-03-18 PROCEDURE — 96372 THER/PROPH/DIAG INJ SC/IM: CPT

## 2020-03-18 PROCEDURE — 97166 OT EVAL MOD COMPLEX 45 MIN: CPT

## 2020-03-18 PROCEDURE — 85027 COMPLETE CBC AUTOMATED: CPT

## 2020-03-18 PROCEDURE — 2580000003 HC RX 258: Performed by: HOSPITALIST

## 2020-03-18 PROCEDURE — 84133 ASSAY OF URINE POTASSIUM: CPT

## 2020-03-18 PROCEDURE — 82570 ASSAY OF URINE CREATININE: CPT

## 2020-03-18 PROCEDURE — 6360000002 HC RX W HCPCS: Performed by: HOSPITALIST

## 2020-03-18 PROCEDURE — 97162 PT EVAL MOD COMPLEX 30 MIN: CPT

## 2020-03-18 PROCEDURE — 96361 HYDRATE IV INFUSION ADD-ON: CPT

## 2020-03-18 PROCEDURE — 96366 THER/PROPH/DIAG IV INF ADDON: CPT

## 2020-03-18 PROCEDURE — 36415 COLL VENOUS BLD VENIPUNCTURE: CPT

## 2020-03-18 PROCEDURE — 84300 ASSAY OF URINE SODIUM: CPT

## 2020-03-18 PROCEDURE — 6370000000 HC RX 637 (ALT 250 FOR IP): Performed by: HOSPITALIST

## 2020-03-18 PROCEDURE — 97535 SELF CARE MNGMENT TRAINING: CPT

## 2020-03-18 RX ORDER — CASTOR OIL AND BALSAM, PERU 788; 87 MG/G; MG/G
OINTMENT TOPICAL 2 TIMES DAILY
Status: DISCONTINUED | OUTPATIENT
Start: 2020-03-18 | End: 2020-03-20 | Stop reason: HOSPADM

## 2020-03-18 RX ADMIN — TAMSULOSIN HYDROCHLORIDE 0.4 MG: 0.4 CAPSULE ORAL at 08:31

## 2020-03-18 RX ADMIN — FAMOTIDINE 20 MG: 20 TABLET, FILM COATED ORAL at 08:31

## 2020-03-18 RX ADMIN — ASPIRIN 325 MG ORAL TABLET 325 MG: 325 PILL ORAL at 22:56

## 2020-03-18 RX ADMIN — NIACIN 500 MG: 500 TABLET, EXTENDED RELEASE ORAL at 22:57

## 2020-03-18 RX ADMIN — ISOSORBIDE MONONITRATE 30 MG: 30 TABLET, EXTENDED RELEASE ORAL at 08:31

## 2020-03-18 RX ADMIN — ATORVASTATIN CALCIUM 80 MG: 40 TABLET, FILM COATED ORAL at 22:56

## 2020-03-18 RX ADMIN — ENOXAPARIN SODIUM 40 MG: 40 INJECTION SUBCUTANEOUS at 08:31

## 2020-03-18 RX ADMIN — Medication 10 ML: at 22:57

## 2020-03-18 RX ADMIN — FERROUS SULFATE TAB 325 MG (65 MG ELEMENTAL FE) 325 MG: 325 (65 FE) TAB at 08:31

## 2020-03-18 RX ADMIN — ZOLPIDEM TARTRATE 10 MG: 5 TABLET ORAL at 22:56

## 2020-03-18 RX ADMIN — SODIUM CHLORIDE: 9 INJECTION, SOLUTION INTRAVENOUS at 05:46

## 2020-03-18 RX ADMIN — Medication 10 MG: at 22:56

## 2020-03-18 RX ADMIN — CEFTRIAXONE 1 G: 1 INJECTION, POWDER, FOR SOLUTION INTRAMUSCULAR; INTRAVENOUS at 18:48

## 2020-03-18 RX ADMIN — LEVOTHYROXINE SODIUM 50 MCG: 0.05 TABLET ORAL at 05:46

## 2020-03-18 RX ADMIN — Medication: at 22:59

## 2020-03-18 ASSESSMENT — PAIN SCALES - GENERAL
PAINLEVEL_OUTOF10: 0

## 2020-03-18 NOTE — PROGRESS NOTES
Hospitalist Progress Note      PCP: INES BLANDON [address]    Date of Admission: 3/17/2020    Chief Complaint: Pain at Iglesias Catheter site    Hospital Course: This is a 76-year-old gentleman with history of chronic indwelling Iglesias catheter since January 2019 due to urinary retention, hypertension came into ER due to penile pain at Iglesias catheter site. Iglesias catheter was exchanged in ER. UA suggestive of UTI. Was admitted with urology consultation. In ER patient was hemodynamically stable. Blood work was significant for creatinine of 1.9.  UA suggestive of UTI. He was admitted with urology consultation. Duplex lower extremity was obtained due to swelling which was negative for DVT. As per ED  EMS escort found patient in his own stool in the office was uncapped with stool covering the wall in bed. Subjective: Patient seen and examined today. Denies any pain at Iglesias catheter site. Denies any nausea, vomiting, fever, chills. No acute event reported overnight. Patient is a wheelchair-bound discussed with patient regarding discharging to SNF which he denied.       Medications:  Reviewed    Infusion Medications   Scheduled Medications    aspirin  325 mg Oral Nightly    atorvastatin  80 mg Oral Nightly    ferrous sulfate  325 mg Oral Daily with breakfast    isosorbide mononitrate  30 mg Oral Daily    levothyroxine  50 mcg Oral Daily    melatonin  10 mg Oral Nightly    niacin  500 mg Oral Nightly    famotidine  20 mg Oral Daily    tamsulosin  0.4 mg Oral Daily    zolpidem  10 mg Oral Nightly    sodium chloride flush  10 mL Intravenous 2 times per day    enoxaparin  40 mg Subcutaneous Daily    cefTRIAXone (ROCEPHIN) IV  1 g Intravenous Q24H     PRN Meds: sodium chloride flush, acetaminophen **OR** acetaminophen, polyethylene glycol, promethazine **OR** ondansetron      Intake/Output Summary (Last 24 hours) at 3/18/2020 1244  Last data filed at 3/18/2020 0848  Gross per 24 hour Intake 1209.45 ml   Output 1525 ml   Net -315.55 ml       Physical Exam Performed:    /63   Pulse 64   Temp 98.1 °F (36.7 °C) (Oral)   Resp 16   Ht 5' 11\" (1.803 m)   Wt 204 lb (92.5 kg)   SpO2 93%   BMI 28.45 kg/m²     General appearance: No apparent distress, appears stated age and cooperative. HEENT: Pupils equal, round, and reactive to light. Conjunctivae/corneas clear. Neck: Supple, with full range of motion. No jugular venous distention. Trachea midline. Respiratory:  Normal respiratory effort. Clear to auscultation, bilaterally without Rales/Wheezes/Rhonchi. Cardiovascular: Regular rate and rhythm with normal S1/S2 without murmurs, rubs or gallops. Abdomen: Soft, non-tender, non-distended with normal bowel sounds. Musculoskeletal: No clubbing, cyanosis. 1+ bilateral lower extremity pitting edema. Full range of motion without deformity. Skin: Skin color, texture, turgor normal.  Rashes on both lower extremities. Neurologic:  Neurovascularly intact without any focal sensory/motor deficits. Cranial nerves: II-XII intact, grossly non-focal.  Psychiatric: Alert and oriented, thought content appropriate, normal insight        Labs:   Recent Labs     03/17/20  1726 03/18/20  0618   WBC 13.3* 8.2   HGB 13.0* 10.9*   HCT 42.2 33.8*    144     Recent Labs     03/17/20  1626 03/17/20  1726 03/18/20  0618     --  138   K 6.2* 5.3* 4.9     --  107   CO2 21  --  19*   BUN 32*  --  33*   CREATININE 1.9*  --  1.7*   CALCIUM 9.4  --  8.2*     Recent Labs     03/17/20  1626 03/18/20  0618   AST 33 13*   ALT 10 8*   BILIDIR  --  <0.2   BILITOT 0.4 0.4   ALKPHOS 323* 230*     No results for input(s): INR in the last 72 hours. No results for input(s): Jay Quintana in the last 72 hours.     Urinalysis:      Lab Results   Component Value Date    NITRU Negative 03/17/2020    WBCUA >100 03/17/2020    BACTERIA 2+ 03/17/2020    RBCUA see below 03/17/2020    BLOODU LARGE 03/17/2020 [address] 1.025 03/17/2020    GLUCOSEU Negative 03/17/2020       Radiology:  VL Extremity Venous Bilateral                 Assessment/Plan:    Active Hospital Problems    Diagnosis Date Noted    Acute renal injury ([facility] [address] [N17.9] 03/17/2020     #Acute kidney injury likely prerenal  Baseline creatinine 1 today 1.7 improving. We will discontinue IV fluids. Ordered urine electrolytes. #UTI likely secondary to chronic indwelling Iglesias catheter present on admission. Started on IV Rocephin follow-up on cultures. #Chronic indwelling Iglesias catheter with discomfort status post catheter exit in ER on 3/17. Urology consult reviewed and appreciated. #Hypothyroid  Continue levothyroxine. #Hyperlipidemia  Continue statin. #CAD  Continue aspirin and statin. DVT Prophylaxis: Lovenox  Diet: DIET GENERAL;  Code Status: Full Code    PT/OT Eval Status: Pending    Dispo -follow-up on urine cultures. If creatinine trending down will discharge in 1 to 2 days.     Lala Hwang MD

## 2020-03-18 NOTE — PROGRESS NOTES
4 Eyes Admission Assessment     I agree as the admission nurse that 2 RN's have performed a thorough Head to Toe Skin Assessment on the patient. ALL assessment sites listed below have been assessed on admission. Areas assessed by both nurses:   [x]   Head, Face, and Ears   [x]   Shoulders, Back, and Chest  [x]   Arms, Elbows, and Hands   [x]   Coccyx, Sacrum, and Ischum  [x]   Legs, Feet, and Heels        Does the Patient have Skin Breakdown?   No , has scattered abrasions, and redness on buttocks      Terrance Prevention initiated:  No   Wound Care Orders initiated:  No      WOC nurse consulted for Pressure Injury (Stage 3,4, Unstageable, DTI, NWPT, and Complex wounds):  No      Nurse 1 eSignature: Electronically signed by Alexa Cisneros RN on 3/17/20 at 11:05 PM EDT    **SHARE this note so that the co-signing nurse is able to place an eSignature**    Nurse 2 eSignature: {Esignature:[id number]}

## 2020-03-18 NOTE — PROGRESS NOTES
Admission: Patient received to room 6310 from ED. Patient admitted with Dx of UTI/ALIS. Patient A&Ox4 upon arrival. Tele monitor applied, rate and rhythm verified with monitor reader. VSS. Patient oriented to room, staff, and call system. Educated on fall protocol and hourly rounding. Assessment as documented. Admission navigator complete. IVF infusing. Bed locked in low position. Patient informed to utilize call light with any needs. Pt verbalized understanding. Call light within reach. Will continue to monitor.

## 2020-03-18 NOTE — PLAN OF CARE
Problem: Falls - Risk of:  Goal: Will remain free from falls  Description: Will remain free from falls  Outcome: Ongoing  Note: Pt is free from falls at this time. Bed is in lowest position, wheels are locked and bed alarm is set. Call light and belongings are within reach. Visual fall sign/blanket are posted. Will continue to monitor. Problem: Falls - Risk of:  Goal: Absence of physical injury  Description: Absence of physical injury  Outcome: Ongoing  Note: Pt is free from accidental physical injury at this time. Pt is AxOx4. Fall Risk assessed per shift. ID band in place. Bed in lowest position, wheels locked and alarm is set. Call light and belongings are within reach. Maintaining a safe environment. Will continue to assess and monitor.

## 2020-03-18 NOTE — PROGRESS NOTES
Daughter(Lives upstairs - works and hx of not assisting pt )  Type of Home: House  Home Layout: One level  Home Access: Ramped entrance  Bathroom Shower/Tub: (sponge bathes )  Bathroom Toilet: Bedside commode(uses next to recliner )  [address]: 3-in-1 commode(in living room for [email or web address] )  Home Equipment: Lift chair, [address]  ADL Assistance: Needs assistance(HHA - assist with sponge bathing when they come )  Homemaking Assistance: Needs assistance(HHA / daughter )  Ambulation Assistance: (non ambulatory at baseline - W/c )  Transfer Assistance: Independent(stand -pivot only )  Active : No  Occupation: Retired  IADL Comments: Per EMS reports - pt's home is in very poor condition  Additional Comments: HHA 2 x week for 1 hour -- Pt is a poor historian        Objective  Treatment included functional transfer training, ADL's and pt. education. Orientation  Overall Orientation Status: Within Functional Limits     Balance  Sitting Balance: Independent  Standing Balance: (unable to stand more then < 5 sec for stand pivot transfer )  Functional Mobility  Functional - Mobility Device: Wheelchair  Activity: Other  Assist Level: Supervision  Functional Mobility Comments: Pt has w/c at home - uses it for all mobility    Toilet Transfers  Toilet - Technique: Stand pivot  Equipment Used: Standard bedside commode  Toilet Transfers Comments: simulated - has similar setup at home   ADL  Feeding: Independent  Grooming: Independent(from seated position.  )  LE Dressing: Minimal assistance(to Don socks over feet - Pt reports has sockaid and reacher at home as needed. )  Toileting: Minimal assistance(pt reports completes toileting seated at [facility]  Pt has indwelling schneider cath )  Additional Comments: pt reports often stays dressed until aides come to help him sponge bathe and change clothes    Tone RUE  RUE Tone: Normotonic  Tone LUE  LUE Tone: Normotonic  Coordination  Movements Are Fluid And Coordinated:

## 2020-03-18 NOTE — PROGRESS NOTES
)  Home Equipment: Lift chair, Wheelchair-manual  ADL Assistance: Needs assistance(HHA - assist with sponge bathing when they come )  Homemaking Assistance: Needs assistance(HHA / daughter )  Ambulation Assistance: (non ambulatory at baseline - W/c )  Transfer Assistance: Independent(stand -pivot only )  Active : No  Occupation: Retired  IADL Comments: Per EMS reports - pt's home is in very poor condition  Additional Comments: HHA 2 x week for 1 hour -- Pt is a poor historian       Objective  Strength  Strength RLE: (grossly 3/5 throughout LE)  Strength LLE: (grossly 3/5 throughout LE)    Bed mobility  Supine to Sit: Stand by assistance(HOB raised and use of rail)  Comment: Pt sleeps in reclining chair at home     Transfers  Sit to Stand: Contact guard assistance(from EOB and w/c, increased time and effort)  Stand to sit: Contact guard assistance  Stand Pivot Transfers: Contact guard assistance(bed to w/c and w/c to chair)     Ambulation  Ambulation?: No(pt is non-ambulatory at baseline)  Wheelchair Activities  Propulsion: Yes  Propulsion 1  Propulsion: Manual  Level: Level Tile  Method: RUE;LUE;RLE;LLE  Level of Assistance: Independent  Distance: 50ft x2     Balance  Standing - Static: Fair  Standing - Dynamic: Poor    Treatment included transfer training with patient education     Plan: Discharge acute PT    Safety Devices  Type of devices: Left in chair, Chair alarm in place, Call light within reach, Nurse notified      AM-PAC Score  AM-PAC Inpatient Mobility Raw Score : 16 (03/18/20 1350)  AM-PAC Inpatient T-Scale Score : 40.78 (03/18/20 1350)  Mobility Inpatient CMS 0-100% Score: 54.16 (03/18/20 1350)  Mobility Inpatient CMS G-Code Modifier : CK (03/18/20 1350)      Therapy Time   Individual Concurrent Group Co-treatment   Time In 1305         Time Out 1359         Minutes 54         Timed Code Treatment Minutes:  40  Total Treatment Minutes:  NATHANIEL Pickering

## 2020-03-18 NOTE — PROGRESS NOTES
[facility] Wound Ostomy Continence Nurse  Follow-up Progress Note       NAME:  Tex MADRIGAL [facility]  MEDICAL RECORD NUMBER:  [id number]  AGE:  67 y.o.    GENDER:  male  :  1940  TODAY'S DATE:  3/18/2020    Subjective:   Wound Identification: BUTTOCKS  Wound Type:pressure  Contributing Factors: weakness, incontinence, immobile      Patient Goal of Care:  [x] Wound Healing  [] Odor Control  [x] Palliative Care  [] Pain Control   [] Other:     Objective:    BP (!) 92/52   Pulse 78   Temp 97.5 °F (36.4 °C) (Oral)   Resp 18   Ht 5' 11\" (1.803 m)   Wt 204 lb (92.5 kg)   SpO2 97%   BMI 28.45 kg/m²   Terrance Risk Score: Terrance Scale Score: 16  Assessment:   Measurements:  Wound 19 Knee Anterior;Right dry thick intact scab (Active)   Number of days: 119       Wound 20 Buttocks Stage 2 bilateral buttocks (Active)   Wound Pressure Stage  2 3/18/2020  2:45 PM   Dressing/Treatment Other (comment) 3/18/2020  2:45 PM   Wound Assessment Pink 3/18/2020  2:45 PM   Drainage Amount None 3/18/2020  2:45 PM   Odor None 3/18/2020  2:45 PM   Margins Undefined edges 3/18/2020  2:45 PM   Number of days: 0       Response to treatment: no c/o's  Plan:   Plan of Care: Wound 20 Buttocks Stage 2 bilateral buttocks-Dressing/Treatment: Other (comment)(venelex ordered)    Specialty Bed Required :yes, ordered  [x] Low Air Loss   [] Pressure Redistribution  [] Fluid Immersion  [] Bariatric  [] Total Pressure Relief  [] Other:     Current Diet: DIET GENERAL;    Patient/Caregiver Teaching:etiology, treatment  Level of patient/caregiver understanding able to:   [x] Indicates understanding       [x] Needs reinforcement-confused  [] Unsuccessful      [] Verbal Understanding  [] Demonstrated understanding       [] No evidence of learning  [] Refused teaching         [] N/A       Electronically signed by Kelechi Nino RN, Carie Stephen on 3/18/2020 at 4:03 PM

## 2020-03-18 NOTE — CONSULTS
Urology Attending Consult Note      Reason for Consultation: pain with chronic schneider    History: 68 yo M with chronic schneider since 2019. He sees Dr Ronn Bullock in office but not for awhile. He came to the ED with penile pain. Schneider was exchanged int he ED and his penile pain is nearly resolved. He also was found to have evidence of a UTI. Urine has been clear. Family History, Social History, Review of Systems:  Reviewed and agreed to as per chart    Vitals:  /63   Pulse 64   Temp 98.1 °F (36.7 °C) (Oral)   Resp 16   Ht 5' 11\" (1.803 m)   Wt 204 lb (92.5 kg)   SpO2 93%   BMI 28.45 kg/m²   Temp  Av.5 °F (36.4 °C)  Min: 96.8 °F (36 °C)  Max: 98.1 °F (36.7 °C)    Intake/Output Summary (Last 24 hours) at 3/18/2020 1220  Last data filed at 3/18/2020 0848  Gross per 24 hour   Intake 1209.45 ml   Output 1525 ml   Net -315.55 ml         Physical:   Well developed, well nourished in no acute distress   Mood indicates no abnormalities. Pt doesnt appear depressed   Orientated to time and place   Neck is supple, trachea is midline   Respiratory effort is normal   Cardiovascular show no extremity swelling   Abdomen no masses or hernias are palpated, there is no tenderness. Liver and Spleen appear normal.   Skin show no abnormal lesions   Lymph nodes are not palpated in the inguinal, neck, or axillary area.      Male :   Penis appears normal--catheter in place   Urethral meatus is normal in size and location   Scrotum appears normal and both testicles appear normal in size and location         Labs:  WBC:    Lab Results   Component Value Date    WBC 8.2 2020     Hemoglobin/Hematocrit:    Lab Results   Component Value Date    HGB 10.9 2020    HCT 33.8 2020     BMP:    Lab Results   Component Value Date     2020    K 4.9 2020     2020    CO2 19 2020    BUN 33 2020    LABALBU 2.8 2020    CREATININE 1.7 2020    CALCIUM 8.2

## 2020-03-19 LAB
ANION GAP SERPL CALCULATED.3IONS-SCNC: 10 MMOL/L (ref 3–16)
BUN BLDV-MCNC: 35 MG/DL (ref 7–20)
CALCIUM SERPL-MCNC: 8.2 MG/DL (ref 8.3–10.6)
CHLORIDE BLD-SCNC: 111 MMOL/L (ref 99–110)
CO2: 20 MMOL/L (ref 21–32)
CREAT SERPL-MCNC: 1.3 MG/DL (ref 0.8–1.3)
GFR AFRICAN AMERICAN: >60
GFR NON-AFRICAN AMERICAN: 53
GLUCOSE BLD-MCNC: 100 MG/DL (ref 70–99)
POTASSIUM SERPL-SCNC: 5.5 MMOL/L (ref 3.5–5.1)
SODIUM BLD-SCNC: 141 MMOL/L (ref 136–145)

## 2020-03-19 PROCEDURE — 96372 THER/PROPH/DIAG INJ SC/IM: CPT

## 2020-03-19 PROCEDURE — 6370000000 HC RX 637 (ALT 250 FOR IP): Performed by: INTERNAL MEDICINE

## 2020-03-19 PROCEDURE — 2580000003 HC RX 258: Performed by: HOSPITALIST

## 2020-03-19 PROCEDURE — 6360000002 HC RX W HCPCS: Performed by: INTERNAL MEDICINE

## 2020-03-19 PROCEDURE — 36415 COLL VENOUS BLD VENIPUNCTURE: CPT

## 2020-03-19 PROCEDURE — 1200000000 HC SEMI PRIVATE

## 2020-03-19 PROCEDURE — 80048 BASIC METABOLIC PNL TOTAL CA: CPT

## 2020-03-19 PROCEDURE — 96375 TX/PRO/DX INJ NEW DRUG ADDON: CPT

## 2020-03-19 PROCEDURE — 6370000000 HC RX 637 (ALT 250 FOR IP): Performed by: HOSPITALIST

## 2020-03-19 PROCEDURE — 6360000002 HC RX W HCPCS: Performed by: HOSPITALIST

## 2020-03-19 RX ORDER — FUROSEMIDE 10 MG/ML
40 INJECTION INTRAMUSCULAR; INTRAVENOUS ONCE
Status: COMPLETED | OUTPATIENT
Start: 2020-03-19 | End: 2020-03-19

## 2020-03-19 RX ORDER — CALCIUM CARBONATE 200(500)MG
500 TABLET,CHEWABLE ORAL 3 TIMES DAILY PRN
Status: DISCONTINUED | OUTPATIENT
Start: 2020-03-19 | End: 2020-03-20 | Stop reason: HOSPADM

## 2020-03-19 RX ADMIN — ISOSORBIDE MONONITRATE 30 MG: 30 TABLET, EXTENDED RELEASE ORAL at 09:11

## 2020-03-19 RX ADMIN — ANTACID TABLETS 500 MG: 500 TABLET, CHEWABLE ORAL at 18:50

## 2020-03-19 RX ADMIN — NIACIN 500 MG: 500 TABLET, EXTENDED RELEASE ORAL at 21:03

## 2020-03-19 RX ADMIN — Medication: at 20:51

## 2020-03-19 RX ADMIN — LEVOTHYROXINE SODIUM 50 MCG: 0.05 TABLET ORAL at 05:43

## 2020-03-19 RX ADMIN — Medication: at 09:12

## 2020-03-19 RX ADMIN — ENOXAPARIN SODIUM 40 MG: 40 INJECTION SUBCUTANEOUS at 09:11

## 2020-03-19 RX ADMIN — ASPIRIN 325 MG ORAL TABLET 325 MG: 325 PILL ORAL at 20:50

## 2020-03-19 RX ADMIN — Medication 10 ML: at 09:12

## 2020-03-19 RX ADMIN — FUROSEMIDE 40 MG: 10 INJECTION, SOLUTION INTRAMUSCULAR; INTRAVENOUS at 11:13

## 2020-03-19 RX ADMIN — Medication 10 MG: at 20:50

## 2020-03-19 RX ADMIN — FERROUS SULFATE TAB 325 MG (65 MG ELEMENTAL FE) 325 MG: 325 (65 FE) TAB at 09:11

## 2020-03-19 RX ADMIN — ZOLPIDEM TARTRATE 10 MG: 5 TABLET ORAL at 20:50

## 2020-03-19 RX ADMIN — Medication 10 ML: at 20:50

## 2020-03-19 RX ADMIN — ATORVASTATIN CALCIUM 80 MG: 40 TABLET, FILM COATED ORAL at 20:50

## 2020-03-19 RX ADMIN — TAMSULOSIN HYDROCHLORIDE 0.4 MG: 0.4 CAPSULE ORAL at 09:11

## 2020-03-19 RX ADMIN — FAMOTIDINE 20 MG: 20 TABLET, FILM COATED ORAL at 09:11

## 2020-03-19 ASSESSMENT — PAIN SCALES - GENERAL
PAINLEVEL_OUTOF10: 0
PAINLEVEL_OUTOF10: 0

## 2020-03-19 NOTE — PROGRESS NOTES
Hospitalist Progress Note      PCP: INES BLANDON [address]    Date of Admission: 3/17/2020    Chief Complaint: Pain at Iglesias Catheter site    Hospital Course: This is a 71-year-old gentleman with history of chronic indwelling Iglesias catheter since January 2019 due to urinary retention, hypertension came into ER due to penile pain at Iglesias catheter site. Iglesias catheter was exchanged in ER. UA suggestive of UTI. Was admitted with urology consultation. In ER patient was hemodynamically stable. Blood work was significant for creatinine of 1.9.  UA suggestive of UTI. He was admitted with urology consultation. Duplex lower extremity was obtained due to swelling which was negative for DVT. As per ED  EMS escort found patient in his own stool. Walls and bed were also covered with stool . UA was suggestive of UTI. Urine culture grew mixed pathogen likely contamination's. Antibiotics were stopped. Urology was consulted recommended follow-up as an outpatient to discuss options regarding suprapubic catheter. Subjective: Patient seen and examined today. Denies any nausea, vomiting, fever, chills. No acute event reported overnight. Patient is a wheelchair-bound discussed with patient regarding discharging to SNF which he denied. He wanted to go home. 1 dose of IV Lasix.       Medications:  Reviewed    Infusion Medications   Scheduled Medications    Venelex   Topical BID    aspirin  325 mg Oral Nightly    atorvastatin  80 mg Oral Nightly    ferrous sulfate  325 mg Oral Daily with breakfast    isosorbide mononitrate  30 mg Oral Daily    levothyroxine  50 mcg Oral Daily    melatonin  10 mg Oral Nightly    niacin  500 mg Oral Nightly    famotidine  20 mg Oral Daily    tamsulosin  0.4 mg Oral Daily    zolpidem  10 mg Oral Nightly    sodium chloride flush  10 mL Intravenous 2 times per day    enoxaparin  40 mg Subcutaneous Daily     PRN Meds: sodium chloride flush, acetaminophen **OR** acetaminophen, polyethylene glycol, promethazine **OR** ondansetron      Intake/Output Summary (Last 24 hours) at 3/19/2020 1429  Last data filed at 3/19/2020 0919  Gross per 24 hour   Intake 120 ml   Output 1725 ml   Net -1605 ml       Physical Exam Performed:    /69   Pulse 79   Temp 97.8 °F (36.6 °C) (Oral)   Resp 18   Ht 5' 11\" (1.803 m)   Wt 207 lb 0.2 oz (93.9 kg)   SpO2 94%   BMI 28.87 kg/m²     General appearance: No apparent distress, appears stated age and cooperative. HEENT: Pupils equal, round, and reactive to light. Conjunctivae/corneas clear. Neck: Supple, with full range of motion. No jugular venous distention. Trachea midline. Respiratory:  Normal respiratory effort. Clear to auscultation, bilaterally without Rales/Wheezes/Rhonchi. Cardiovascular: Regular rate and rhythm with normal S1/S2 without murmurs, rubs or gallops. Abdomen: Soft, non-tender, non-distended with normal bowel sounds. Musculoskeletal: No clubbing, cyanosis. 1+ bilateral lower extremity pitting edema. Full range of motion without deformity. Skin: Skin color, texture, turgor normal.  Rashes on both lower extremities. Neurologic:  Neurovascularly intact without any focal sensory/motor deficits. Cranial nerves: II-XII intact, grossly non-focal.  Psychiatric: Alert and oriented, thought content appropriate, normal insight        Labs:   Recent Labs     03/17/20  1726 03/18/20  0618   WBC 13.3* 8.2   HGB 13.0* 10.9*   HCT 42.2 33.8*    144     Recent Labs     03/17/20  1626 03/17/20  1726 03/18/20  0618 03/19/20  0530     --  138 141   K 6.2* 5.3* 4.9 5.5*     --  107 111*   CO2 21  --  19* 20*   BUN 32*  --  33* 35*   CREATININE 1.9*  --  1.7* 1.3   CALCIUM 9.4  --  8.2* 8.2*     Recent Labs     03/17/20  1626 03/18/20  0618   AST 33 13*   ALT 10 8*   BILIDIR  --  <0.2   BILITOT 0.4 0.4   ALKPHOS 323* 230*     No results for input(s): INR in the last 72 hours.   No results for input(s): CKTOTAL,

## 2020-03-19 NOTE — CARE COORDINATION
No DME  Needs at this time. Dispo -IV Lasix 40 mg one-time dose today . Follow-up on creatinine if is stable patient can be discharged home with home care      Mariel Sewell and his family were provided with choice of provider; he and his family are in agreement with the discharge plan.     Care Transition Patient: Sharmila Sanchez RN  [facility]  Case Management Department  Ph: [phone]

## 2020-03-19 NOTE — PLAN OF CARE
Problem: Falls - Risk of:  Goal: Will remain free from falls  Description: Will remain free from falls  Outcome: Ongoing  Note: Patient is a fall risk. Patient is a oral steady. See Fall Risk assessment for details. Bed is in low, lock position; call light/belongings within reach. No attempts to get out of bed have been made, calls appropriately when assistance is needed. Bed alarm and hourly rounds in place for safety. Will continue to monitor and reassess throughout shift.         Problem: Falls - Risk of:  Goal: Absence of physical injury  Description: Absence of physical injury  Outcome: Ongoing

## 2020-03-20 VITALS
HEART RATE: 80 BPM | WEIGHT: 207 LBS | SYSTOLIC BLOOD PRESSURE: 167 MMHG | BODY MASS INDEX: 28.98 KG/M2 | HEIGHT: 71 IN | TEMPERATURE: 97.9 F | DIASTOLIC BLOOD PRESSURE: 76 MMHG | OXYGEN SATURATION: 95 % | RESPIRATION RATE: 19 BRPM

## 2020-03-20 LAB
ANION GAP SERPL CALCULATED.3IONS-SCNC: 12 MMOL/L (ref 3–16)
BUN BLDV-MCNC: 39 MG/DL (ref 7–20)
CALCIUM SERPL-MCNC: 8.3 MG/DL (ref 8.3–10.6)
CHLORIDE BLD-SCNC: 106 MMOL/L (ref 99–110)
CO2: 23 MMOL/L (ref 21–32)
CREAT SERPL-MCNC: 1.1 MG/DL (ref 0.8–1.3)
GFR AFRICAN AMERICAN: >60
GFR NON-AFRICAN AMERICAN: >60
GLUCOSE BLD-MCNC: 89 MG/DL (ref 70–99)
POTASSIUM SERPL-SCNC: 4.9 MMOL/L (ref 3.5–5.1)
SODIUM BLD-SCNC: 141 MMOL/L (ref 136–145)

## 2020-03-20 PROCEDURE — 6370000000 HC RX 637 (ALT 250 FOR IP): Performed by: INTERNAL MEDICINE

## 2020-03-20 PROCEDURE — 6370000000 HC RX 637 (ALT 250 FOR IP): Performed by: HOSPITALIST

## 2020-03-20 PROCEDURE — 6360000002 HC RX W HCPCS: Performed by: HOSPITALIST

## 2020-03-20 PROCEDURE — 36415 COLL VENOUS BLD VENIPUNCTURE: CPT

## 2020-03-20 PROCEDURE — 96372 THER/PROPH/DIAG INJ SC/IM: CPT

## 2020-03-20 PROCEDURE — 80048 BASIC METABOLIC PNL TOTAL CA: CPT

## 2020-03-20 PROCEDURE — 2580000003 HC RX 258: Performed by: HOSPITALIST

## 2020-03-20 RX ORDER — FUROSEMIDE 20 MG/1
20 TABLET ORAL DAILY
Qty: 30 TABLET | Refills: 1 | Status: SHIPPED | OUTPATIENT
Start: 2020-03-20 | End: 2020-06-29 | Stop reason: ALTCHOICE

## 2020-03-20 RX ORDER — GABAPENTIN 300 MG/1
600 CAPSULE ORAL 3 TIMES DAILY
Status: DISCONTINUED | OUTPATIENT
Start: 2020-03-20 | End: 2020-03-20 | Stop reason: HOSPADM

## 2020-03-20 RX ORDER — CASTOR OIL AND BALSAM, PERU 788; 87 MG/G; MG/G
OINTMENT TOPICAL 2 TIMES DAILY
Qty: 2 TUBE | Refills: 0 | Status: ON HOLD | OUTPATIENT
Start: 2020-03-20 | End: 2021-06-20 | Stop reason: CLARIF

## 2020-03-20 RX ADMIN — TAMSULOSIN HYDROCHLORIDE 0.4 MG: 0.4 CAPSULE ORAL at 09:45

## 2020-03-20 RX ADMIN — GABAPENTIN 600 MG: 300 CAPSULE ORAL at 12:40

## 2020-03-20 RX ADMIN — Medication: at 09:55

## 2020-03-20 RX ADMIN — FAMOTIDINE 20 MG: 20 TABLET, FILM COATED ORAL at 09:45

## 2020-03-20 RX ADMIN — ENOXAPARIN SODIUM 40 MG: 40 INJECTION SUBCUTANEOUS at 09:45

## 2020-03-20 RX ADMIN — ISOSORBIDE MONONITRATE 30 MG: 30 TABLET, EXTENDED RELEASE ORAL at 09:45

## 2020-03-20 RX ADMIN — FERROUS SULFATE TAB 325 MG (65 MG ELEMENTAL FE) 325 MG: 325 (65 FE) TAB at 09:45

## 2020-03-20 RX ADMIN — LEVOTHYROXINE SODIUM 50 MCG: 0.05 TABLET ORAL at 09:52

## 2020-03-20 RX ADMIN — Medication 10 ML: at 09:45

## 2020-03-20 ASSESSMENT — PAIN SCALES - GENERAL: PAINLEVEL_OUTOF10: 0

## 2020-03-20 NOTE — DISCHARGE INSTR - COC
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 Chin Sabal    Complicated UTI (urinary tract infection) N39.0    ALIS (acute kidney injury) ([address] N17.9    Edema R60.9    Problem with urinary catheter ([address] T83. 9XXA    Acute encephalopathy G93.40    Chronic indwelling Iglesias catheter Z96.0    Hyperlipidemia E78.5    Bilateral lower leg cellulitis L03.116, L03.115    Neurogenic bladder N31.9    Bacteriuria R82.71    Abnormality of urethral meatus Q64.70    Gross hematuria R31.0    CHF with unknown LVEF ([facility]) I50.9    Dyspnea R06.00    Bradycardia R00.1    Pseudomonas infection A49.8    Acute renal injury ([address] N17.9       Isolation/Infection:   Isolation          No Isolation        Patient Infection Status     Infection Onset Added Last Indicated Last Indicated By Review Planned Expiration Resolved Resolved By    MRSA 02/07/20 02/08/20 02/07/20 MRSA DNA Probe, Nasal        Resolved    MDRO (multi-drug resistant organism) 10/26/19 10/28/19 11/19/19 Urine culture   01/16/20 Mayra Canela, RN          Nurse Assessment:  Last Vital Signs: BP (!) 171/73   Pulse 77   Temp 97.8 °F (36.6 °C) (Oral)   Resp 17   Ht 5' 11\" (1.803 m)   Wt 207 lb (93.9 kg)   SpO2 98%   BMI 28.87 kg/m²     Last documented pain score (0-10 scale): Pain Level: 0  Last Weight:   Wt Readings from Last 1 Encounters:   03/20/20 207 lb (93.9 kg)     Mental Status:  alert    IV Access:  - None    Nursing Mobility/ADLs:  Walking   Assisted  Transfer  Assisted  Bathing  Assisted  Dressing  Assisted  Toileting  Assisted  Feeding wheelchair  Other Treatments: NA    Patient's personal belongings (please select all that are sent with patient):  None    RN SIGNATURE:  Electronically signed by Shyam Smart RN on 3/20/20 at 3:27 PM EDT    CASE MANAGEMENT/SOCIAL WORK SECTION    Inpatient Status Date: ***    Readmission Risk Assessment Score:  Readmission Risk              Risk of Unplanned Readmission:        40           Discharging to Facility/ Agency   · Name:   · Address:  · Phone:  · Fax:    Dialysis Facility (if applicable)   · Name:  · Address:  · Dialysis Schedule:  · Phone:  · Fax:    / signature: {Esignature:[id number]}    PHYSICIAN SECTION    Prognosis: Fair    Condition at Discharge: Stable    Rehab Potential (if transferring to Rehab): Fair    Recommended Labs or Other Treatments After Discharge: BMP in 1 week report to PCP, F/U with urology in 1 month for suprapubic catheter, Resume previous home care orders    Physician Certification: I certify the above information and transfer of Brian Boss  is necessary for the continuing treatment of the diagnosis listed and that he requires [address] for greater 30 days.      Update Admission H&P: No change in H&P    PHYSICIAN SIGNATURE:  Electronically signed by Raghu Mcfarland MD on 3/20/20 at 10:45 AM EDT

## 2020-03-20 NOTE — DISCHARGE SUMMARY
apparent distress, appears stated age and cooperative. HEENT: Pupils equal, round, and reactive to light. Conjunctivae/corneas clear. Neck: Supple, with full range of motion. No jugular venous distention. Trachea midline. Respiratory:  Normal respiratory effort. Clear to auscultation, bilaterally without Rales/Wheezes/Rhonchi. Cardiovascular: Regular rate and rhythm with normal S1/S2 without murmurs, rubs or gallops. Abdomen: Soft, non-tender, non-distended with normal bowel sounds. Musculoskeletal: No clubbing, cyanosis. 1+ bilateral lower extremity pitting edema. Full range of motion without deformity. Skin: Skin color, texture, turgor normal.  Rashes on both lower extremities. Neurologic:  Neurovascularly intact without any focal sensory/motor deficits. Cranial nerves: II-XII intact, grossly non-focal.  Psychiatric: Alert and oriented, thought content appropriate, normal insight      Labs:  For convenience and continuity at follow-up the following most recent labs are provided:      CBC:    Lab Results   Component Value Date    WBC 8.2 03/18/2020    HGB 10.9 03/18/2020    HCT 33.8 03/18/2020     03/18/2020       Renal:    Lab Results   Component Value Date     03/20/2020    K 4.9 03/20/2020    K 4.9 03/18/2020     03/20/2020    CO2 23 03/20/2020    BUN 39 03/20/2020    CREATININE 1.1 03/20/2020    CALCIUM 8.3 03/20/2020    PHOS 2.8 02/06/2020         Significant Diagnostic Studies    Radiology:   VL Extremity Venous Bilateral   Final Result             Consults:     IP CONSULT TO SOCIAL WORK  IP CONSULT TO HOSPITALIST  IP CONSULT TO UROLOGY  IP CONSULT TO DIETITIAN  IP CONSULT TO CASE MANAGEMENT  IP CONSULT TO SOCIAL WORK  IP CONSULT TO HOME CARE NEEDS    Disposition:  D/c home with home care     Condition at Discharge: Stable    Discharge Instructions/Follow-up: Urology in 2-week    Code Status:  Full Code     Activity: activity as tolerated    Diet: cardiac diet      Discharge Medications:     Current Discharge Medication List           Details   Balsam Peru-Castor Oil (VENELEX) OINT ointment Apply topically 2 times daily  Qty: 2 Tube, Refills: 0      furosemide (LASIX) 20 MG tablet Take 1 tablet by mouth daily  Qty: 30 tablet, Refills: 1              Details   gabapentin (NEURONTIN) 600 MG tablet Take 300 mg by mouth 3 times daily. levothyroxine (SYNTHROID) 50 MCG tablet Take 50 mcg by mouth Daily      tamsulosin (FLOMAX) 0.4 MG capsule Take 0.4 mg by mouth daily      isosorbide mononitrate (IMDUR) 30 MG extended release tablet Take 1 tablet by mouth daily  Qty: 30 tablet, Refills: 3      ranitidine (ZANTAC) 300 MG tablet Take 300 mg by mouth nightly As needed      Melatonin 10 MG TABS Take 10 mg by mouth nightly as needed       ferrous sulfate 325 (65 Fe) MG tablet Take 325 mg by mouth daily (with breakfast)      niacin 500 MG tablet Take 1 tablet by mouth daily as needed (for spasticity)       aspirin 325 MG tablet Take 325 mg by mouth nightly       atorvastatin (LIPITOR) 80 MG tablet Take 80 mg by mouth nightly. zolpidem (AMBIEN) 10 MG tablet Take 10 mg by mouth nightly as needed for Sleep. Time Spent on discharge is more than 30 minutes in the examination, evaluation, counseling and review of medications and discharge plan. Signed:    Stefani Barillas MD   3/20/2020      Thank you INES Stanley for the opportunity to be involved in this patient's care. If you have any questions or concerns please feel free to contact me at [phone].

## 2020-03-21 ENCOUNTER — CARE COORDINATION (OUTPATIENT)
Dept: CASE MANAGEMENT | Age: 80
End: 2020-03-21

## 2020-03-21 NOTE — CARE COORDINATION
Patient contacted regarding recent discharge and COVID-19 risk   Care Transition Nurse/ Ambulatory Care Manager contacted the patient by telephone to perform post discharge assessment. Verified name and  with patient as identifiers. Patient has following risk factors of: heart failure. CTN/ACM reviewed discharge instructions, medical action plan and red flags related to discharge diagnosis. Reviewed and educated them on any new and changed medications related to discharge diagnosis. Advised obtaining a 90-day supply of all daily and as-needed medications. Education provided regarding infection prevention, and signs and symptoms of COVID-19 and when to seek medical attention with patient who verbalized understanding. Discussed exposure protocols and quarantine from [address] you at higher risk for severe illness  and given an opportunity for questions and concerns. The patient agrees to contact the COVID-19 hotline [phone] or PCP office for questions related to their healthcare. CTN/ACM provided contact information for future reference. From CDC: Are you at higher risk for severe illness?  Wash your hands often.  Avoid close contact (6 feet, which is about two arm lengths) with people who are sick.  Put distance between yourself and other people if COVID-19 is spreading in your community.  Clean and disinfect frequently touched surfaces.  Avoid all cruise travel and non-essential air travel.  Call your healthcare professional if you have concerns about COVID-19 and your underlying condition or if you are sick.     For more information on steps you can take to protect yourself, see CDC's How to Protect Yourself     Thank You,    Mena Price RN  Care Transition Coordinator  Contact [place]:[phone]

## 2020-05-02 ENCOUNTER — HOSPITAL ENCOUNTER (INPATIENT)
Age: 80
LOS: 4 days | Discharge: HOME | DRG: 699 | End: 2020-05-06
Attending: EMERGENCY MEDICINE | Admitting: INTERNAL MEDICINE
Payer: MEDICARE

## 2020-05-02 ENCOUNTER — APPOINTMENT (OUTPATIENT)
Dept: CT IMAGING | Age: 80
DRG: 699 | End: 2020-05-02
Payer: MEDICARE

## 2020-05-02 PROBLEM — N49.2 CELLULITIS OF SCROTUM: Status: ACTIVE | Noted: 2020-05-02

## 2020-05-02 LAB
AMORPHOUS: ABNORMAL /HPF
ANION GAP SERPL CALCULATED.3IONS-SCNC: 14 MMOL/L (ref 3–16)
BACTERIA: ABNORMAL /HPF
BASOPHILS ABSOLUTE: 0.1 K/UL (ref 0–0.2)
BASOPHILS RELATIVE PERCENT: 0.9 %
BILIRUBIN URINE: NEGATIVE
BLOOD, URINE: ABNORMAL
BUN BLDV-MCNC: 39 MG/DL (ref 7–20)
CALCIUM SERPL-MCNC: 8.7 MG/DL (ref 8.3–10.6)
CHLORIDE BLD-SCNC: 100 MMOL/L (ref 99–110)
CLARITY: CLEAR
CO2: 20 MMOL/L (ref 21–32)
COLOR: YELLOW
CREAT SERPL-MCNC: 1 MG/DL (ref 0.8–1.3)
CRYSTALS, UA: ABNORMAL /HPF
CRYSTALS, UA: ABNORMAL /HPF
EOSINOPHILS ABSOLUTE: 0.5 K/UL (ref 0–0.6)
EOSINOPHILS RELATIVE PERCENT: 5.2 %
GFR AFRICAN AMERICAN: >60
GFR NON-AFRICAN AMERICAN: >60
GLUCOSE BLD-MCNC: 121 MG/DL (ref 70–99)
GLUCOSE URINE: NEGATIVE MG/DL
HCT VFR BLD CALC: 36.7 % (ref 40.5–52.5)
HEMOGLOBIN: 12.1 G/DL (ref 13.5–17.5)
KETONES, URINE: NEGATIVE MG/DL
LEUKOCYTE ESTERASE, URINE: ABNORMAL
LYMPHOCYTES ABSOLUTE: 1 K/UL (ref 1–5.1)
LYMPHOCYTES RELATIVE PERCENT: 10.7 %
MCH RBC QN AUTO: 31.1 PG (ref 26–34)
MCHC RBC AUTO-ENTMCNC: 33 G/DL (ref 31–36)
MCV RBC AUTO: 94.1 FL (ref 80–100)
MICROSCOPIC EXAMINATION: YES
MONOCYTES ABSOLUTE: 0.6 K/UL (ref 0–1.3)
MONOCYTES RELATIVE PERCENT: 5.8 %
NEUTROPHILS ABSOLUTE: 7.4 K/UL (ref 1.7–7.7)
NEUTROPHILS RELATIVE PERCENT: 77.4 %
NITRITE, URINE: POSITIVE
PDW BLD-RTO: 16.2 % (ref 12.4–15.4)
PH UA: >=9 (ref 5–8)
PLATELET # BLD: 195 K/UL (ref 135–450)
PMV BLD AUTO: 9.8 FL (ref 5–10.5)
POTASSIUM REFLEX MAGNESIUM: 4.3 MMOL/L (ref 3.5–5.1)
PROTEIN UA: >=300 MG/DL
RBC # BLD: 3.9 M/UL (ref 4.2–5.9)
RBC UA: ABNORMAL /HPF (ref 0–4)
SODIUM BLD-SCNC: 134 MMOL/L (ref 136–145)
SPECIFIC GRAVITY UA: <=1.005 (ref 1–1.03)
URINE TYPE: ABNORMAL
UROBILINOGEN, URINE: 1 E.U./DL
WBC # BLD: 9.6 K/UL (ref 4–11)
WBC UA: ABNORMAL /HPF (ref 0–5)

## 2020-05-02 PROCEDURE — 96365 THER/PROPH/DIAG IV INF INIT: CPT

## 2020-05-02 PROCEDURE — 96375 TX/PRO/DX INJ NEW DRUG ADDON: CPT

## 2020-05-02 PROCEDURE — 2580000003 HC RX 258: Performed by: INTERNAL MEDICINE

## 2020-05-02 PROCEDURE — 87186 SC STD MICRODIL/AGAR DIL: CPT

## 2020-05-02 PROCEDURE — 6360000002 HC RX W HCPCS: Performed by: STUDENT IN AN ORGANIZED HEALTH CARE EDUCATION/TRAINING PROGRAM

## 2020-05-02 PROCEDURE — 87086 URINE CULTURE/COLONY COUNT: CPT

## 2020-05-02 PROCEDURE — 74177 CT ABD & PELVIS W/CONTRAST: CPT

## 2020-05-02 PROCEDURE — 2580000003 HC RX 258: Performed by: STUDENT IN AN ORGANIZED HEALTH CARE EDUCATION/TRAINING PROGRAM

## 2020-05-02 PROCEDURE — 81001 URINALYSIS AUTO W/SCOPE: CPT

## 2020-05-02 PROCEDURE — 6360000004 HC RX CONTRAST MEDICATION: Performed by: STUDENT IN AN ORGANIZED HEALTH CARE EDUCATION/TRAINING PROGRAM

## 2020-05-02 PROCEDURE — 6370000000 HC RX 637 (ALT 250 FOR IP): Performed by: STUDENT IN AN ORGANIZED HEALTH CARE EDUCATION/TRAINING PROGRAM

## 2020-05-02 PROCEDURE — 1200000000 HC SEMI PRIVATE

## 2020-05-02 PROCEDURE — 36415 COLL VENOUS BLD VENIPUNCTURE: CPT

## 2020-05-02 PROCEDURE — 99284 EMERGENCY DEPT VISIT MOD MDM: CPT

## 2020-05-02 PROCEDURE — 87077 CULTURE AEROBIC IDENTIFY: CPT

## 2020-05-02 PROCEDURE — 6370000000 HC RX 637 (ALT 250 FOR IP): Performed by: INTERNAL MEDICINE

## 2020-05-02 PROCEDURE — 80048 BASIC METABOLIC PNL TOTAL CA: CPT

## 2020-05-02 PROCEDURE — 85025 COMPLETE CBC W/AUTO DIFF WBC: CPT

## 2020-05-02 RX ORDER — ONDANSETRON 2 MG/ML
4 INJECTION INTRAMUSCULAR; INTRAVENOUS EVERY 6 HOURS PRN
Status: DISCONTINUED | OUTPATIENT
Start: 2020-05-02 | End: 2020-05-06 | Stop reason: HOSPADM

## 2020-05-02 RX ORDER — ATORVASTATIN CALCIUM 80 MG/1
80 TABLET, FILM COATED ORAL DAILY
Status: DISCONTINUED | OUTPATIENT
Start: 2020-05-03 | End: 2020-05-06 | Stop reason: HOSPADM

## 2020-05-02 RX ORDER — GABAPENTIN 600 MG/1
300 TABLET ORAL 3 TIMES DAILY
Status: DISCONTINUED | OUTPATIENT
Start: 2020-05-02 | End: 2020-05-02

## 2020-05-02 RX ORDER — GABAPENTIN 600 MG/1
600 TABLET ORAL NIGHTLY
Status: DISCONTINUED | OUTPATIENT
Start: 2020-05-02 | End: 2020-05-06 | Stop reason: HOSPADM

## 2020-05-02 RX ORDER — ZOLPIDEM TARTRATE 5 MG/1
5 TABLET ORAL NIGHTLY PRN
Status: DISCONTINUED | OUTPATIENT
Start: 2020-05-02 | End: 2020-05-06 | Stop reason: HOSPADM

## 2020-05-02 RX ORDER — POLYETHYLENE GLYCOL 3350 17 G/17G
17 POWDER, FOR SOLUTION ORAL DAILY PRN
Status: DISCONTINUED | OUTPATIENT
Start: 2020-05-02 | End: 2020-05-06 | Stop reason: HOSPADM

## 2020-05-02 RX ORDER — ISOSORBIDE MONONITRATE 30 MG/1
30 TABLET, EXTENDED RELEASE ORAL DAILY
Status: DISCONTINUED | OUTPATIENT
Start: 2020-05-03 | End: 2020-05-04

## 2020-05-02 RX ORDER — PROMETHAZINE HYDROCHLORIDE 12.5 MG/1
12.5 TABLET ORAL EVERY 6 HOURS PRN
Status: DISCONTINUED | OUTPATIENT
Start: 2020-05-02 | End: 2020-05-06 | Stop reason: HOSPADM

## 2020-05-02 RX ORDER — LIDOCAINE HYDROCHLORIDE 20 MG/ML
JELLY TOPICAL ONCE
Status: COMPLETED | OUTPATIENT
Start: 2020-05-02 | End: 2020-05-02

## 2020-05-02 RX ORDER — ASPIRIN 325 MG
325 TABLET ORAL DAILY
Status: DISCONTINUED | OUTPATIENT
Start: 2020-05-03 | End: 2020-05-02

## 2020-05-02 RX ORDER — ASPIRIN 81 MG/1
81 TABLET, CHEWABLE ORAL DAILY
Status: DISCONTINUED | OUTPATIENT
Start: 2020-05-03 | End: 2020-05-06 | Stop reason: HOSPADM

## 2020-05-02 RX ORDER — ACETAMINOPHEN 325 MG/1
650 TABLET ORAL EVERY 6 HOURS PRN
Status: DISCONTINUED | OUTPATIENT
Start: 2020-05-02 | End: 2020-05-06 | Stop reason: HOSPADM

## 2020-05-02 RX ORDER — SODIUM CHLORIDE 0.9 % (FLUSH) 0.9 %
10 SYRINGE (ML) INJECTION PRN
Status: DISCONTINUED | OUTPATIENT
Start: 2020-05-02 | End: 2020-05-06 | Stop reason: HOSPADM

## 2020-05-02 RX ORDER — UREA 10 %
10 LOTION (ML) TOPICAL NIGHTLY PRN
Status: DISCONTINUED | OUTPATIENT
Start: 2020-05-02 | End: 2020-05-06 | Stop reason: HOSPADM

## 2020-05-02 RX ORDER — LEVOTHYROXINE SODIUM 0.05 MG/1
50 TABLET ORAL DAILY
Status: DISCONTINUED | OUTPATIENT
Start: 2020-05-03 | End: 2020-05-06 | Stop reason: HOSPADM

## 2020-05-02 RX ORDER — ACETAMINOPHEN 650 MG/1
650 SUPPOSITORY RECTAL EVERY 6 HOURS PRN
Status: DISCONTINUED | OUTPATIENT
Start: 2020-05-02 | End: 2020-05-06 | Stop reason: HOSPADM

## 2020-05-02 RX ORDER — SODIUM CHLORIDE, SODIUM LACTATE, POTASSIUM CHLORIDE, AND CALCIUM CHLORIDE .6; .31; .03; .02 G/100ML; G/100ML; G/100ML; G/100ML
500 INJECTION, SOLUTION INTRAVENOUS ONCE
Status: COMPLETED | OUTPATIENT
Start: 2020-05-02 | End: 2020-05-02

## 2020-05-02 RX ORDER — FAMOTIDINE 20 MG/1
20 TABLET, FILM COATED ORAL DAILY
Status: DISCONTINUED | OUTPATIENT
Start: 2020-05-03 | End: 2020-05-03

## 2020-05-02 RX ORDER — SODIUM CHLORIDE 0.9 % (FLUSH) 0.9 %
10 SYRINGE (ML) INJECTION EVERY 12 HOURS SCHEDULED
Status: DISCONTINUED | OUTPATIENT
Start: 2020-05-02 | End: 2020-05-06 | Stop reason: HOSPADM

## 2020-05-02 RX ADMIN — HYDROMORPHONE HYDROCHLORIDE 0.5 MG: 1 INJECTION, SOLUTION INTRAMUSCULAR; INTRAVENOUS; SUBCUTANEOUS at 19:01

## 2020-05-02 RX ADMIN — VANCOMYCIN HYDROCHLORIDE 1750 MG: 10 INJECTION, POWDER, LYOPHILIZED, FOR SOLUTION INTRAVENOUS at 19:52

## 2020-05-02 RX ADMIN — DEXTROSE MONOHYDRATE 1 G: 5 INJECTION INTRAVENOUS at 17:58

## 2020-05-02 RX ADMIN — Medication 10 ML: at 23:14

## 2020-05-02 RX ADMIN — GABAPENTIN 600 MG: 600 TABLET, FILM COATED ORAL at 23:14

## 2020-05-02 RX ADMIN — SODIUM CHLORIDE, SODIUM LACTATE, POTASSIUM CHLORIDE, AND CALCIUM CHLORIDE 500 ML: .6; .31; .03; .02 INJECTION, SOLUTION INTRAVENOUS at 17:58

## 2020-05-02 RX ADMIN — IOPAMIDOL 80 ML: 755 INJECTION, SOLUTION INTRAVENOUS at 17:50

## 2020-05-02 RX ADMIN — LIDOCAINE HYDROCHLORIDE: 20 JELLY TOPICAL at 19:15

## 2020-05-02 RX ADMIN — Medication 10 MG: at 23:14

## 2020-05-02 ASSESSMENT — ENCOUNTER SYMPTOMS
SHORTNESS OF BREATH: 0
DIARRHEA: 1
VOMITING: 0
NAUSEA: 0
BLOOD IN STOOL: 0
ABDOMINAL PAIN: 0
COUGH: 0

## 2020-05-02 ASSESSMENT — PAIN SCALES - GENERAL
PAINLEVEL_OUTOF10: 10
PAINLEVEL_OUTOF10: 0
PAINLEVEL_OUTOF10: 0
PAINLEVEL_OUTOF10: 10

## 2020-05-02 ASSESSMENT — PAIN DESCRIPTION - PAIN TYPE: TYPE: ACUTE PAIN

## 2020-05-02 ASSESSMENT — PAIN DESCRIPTION - PROGRESSION: CLINICAL_PROGRESSION: GRADUALLY IMPROVING

## 2020-05-02 ASSESSMENT — PAIN DESCRIPTION - ORIENTATION: ORIENTATION: MID

## 2020-05-02 ASSESSMENT — PAIN DESCRIPTION - ONSET: ONSET: ON-GOING

## 2020-05-02 ASSESSMENT — PAIN DESCRIPTION - FREQUENCY: FREQUENCY: INTERMITTENT

## 2020-05-02 ASSESSMENT — PAIN DESCRIPTION - DESCRIPTORS: DESCRIPTORS: ACHING

## 2020-05-02 ASSESSMENT — PAIN - FUNCTIONAL ASSESSMENT: PAIN_FUNCTIONAL_ASSESSMENT: PREVENTS OR INTERFERES SOME ACTIVE ACTIVITIES AND ADLS

## 2020-05-02 NOTE — ED PROVIDER NOTES
[address] [facility] RESIDENT NOTE       Date of evaluation: 5/2/2020    Chief Complaint     Other (catheter problem )    History of Present Illness     Alba Munoz is a 67 y.o. male with a hx of chronic indwelling schneider catheter who presents with penile pain. Patient reports that he has had penile pain and discomfort over the past 1 to 2 days. He has had similar symptoms in the past when he has had urinary tract infections. He reports lower abdominal pain associated with this. He also states that he has had diarrhea for 1 to 2 weeks. He states that he is supposed to have a home health aide that helps him \"clean up\", but that he has not had assistance over the past several days. He recently had a telehealth visit with his primary care provider    Review of Systems     Review of Systems   Constitutional: Negative for fever. HENT: Negative for congestion. Eyes: Negative for visual disturbance. Respiratory: Negative for cough and shortness of breath. Cardiovascular: Negative for chest pain. Gastrointestinal: Positive for diarrhea. Negative for abdominal pain, blood in stool, nausea and vomiting. Genitourinary: Positive for dysuria, penile pain and penile swelling. Musculoskeletal: Negative for myalgias. Skin: Negative for rash. Neurological: Negative for dizziness and headaches. Psychiatric/Behavioral: The patient is not nervous/anxious. A full review of systems was obtained and is otherwise negative except as noted above in the history of present illness.     Past Medical, Surgical, Family, and Social History     He has a past medical history of BPH (benign prostatic hyperplasia), Carotid stenosis, Cellulitis, Chronic back pain, Diverticular disease, Erectile dysfunction, GERD (gastroesophageal reflux disease), History of atrial fibrillation, Hypercholesteremia, Hypertension, Lower GI bleed, MDRO (multiple drug resistant organisms) resistance, Neuromuscular disorder ([facility] Renal insufficiency, Risk for falls, Tinea corporis, and Vitamin B12 deficiency. 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 His family history includes Heart Disease in his father. He reports that he quit smoking about 50 years ago. He has never used smokeless tobacco. He reports that he does not drink alcohol or use drugs. Medications     Previous Medications    ASPIRIN 325 MG TABLET    Take 325 mg by mouth nightly     ATORVASTATIN (LIPITOR) 80 MG TABLET    Take 80 mg by mouth nightly. BALSAM PERU-CASTOR OIL (VENELEX) OINT OINTMENT    Apply topically 2 times daily    FERROUS SULFATE 325 (65 FE) MG TABLET    Take 325 mg by mouth daily (with breakfast)    FUROSEMIDE (LASIX) 20 MG TABLET    Take 1 tablet by mouth daily    GABAPENTIN (NEURONTIN) 600 MG TABLET    Take 300 mg by mouth 3 times daily. ISOSORBIDE MONONITRATE (IMDUR) 30 MG EXTENDED RELEASE TABLET    Take 1 tablet by mouth daily    LEVOTHYROXINE (SYNTHROID) 50 MCG TABLET    Take 50 mcg by mouth Daily    MELATONIN 10 MG TABS    Take 10 mg by mouth nightly as needed     NIACIN 500 MG TABLET    Take 1 tablet by mouth daily as needed (for spasticity)     RANITIDINE (ZANTAC) 300 MG TABLET    Take 300 mg by mouth nightly As needed    TAMSULOSIN (FLOMAX) 0.4 MG CAPSULE    Take 0.4 mg by mouth daily    ZOLPIDEM (AMBIEN) 10 MG TABLET    Take 10 mg by mouth nightly as needed for Sleep. Allergies     He is allergic to bactrim [sulfamethoxazole-trimethoprim]. Physical Exam     INITIAL VITALS: BP: (!) 150/82, Temp: 98.4 °F (36.9 °C), Pulse: 96, Resp: 20, SpO2: 100 %     General: Patient is a 67 y.o. male who is seated comfortably in NAD. HEENT: Head atraumatic. Pupils equal, round, and reactive to light. Sclera clear. Mucous membranes moist. Oropharynx clear. the penis and scrotum. He does not have crepitus or other systemic signs to suggest Isaac's gangrene. Urinalysis is difficult to interpret in the setting of chronic Iglesias, but is nitrite positive. CT of the abdomen pelvis was obtained and demonstrates Iglesias tip terminating in the prostatic urethra. This was advanced, and urine drained. He was given 1 mg of ceftriaxone for nitrite positive urinalysis and concern for scrotal cellulitis. He will be admitted to the hospitalist for further observation. This patient was also evaluated by the attending physician. All care plans were discussed and agreed upon. Clinical Impression     1. Cellulitis of scrotum    2. Chronic indwelling Iglesias catheter      Disposition     PATIENT REFERRED TO:  No follow-up provider specified.     DISCHARGE MEDICATIONS:  New Prescriptions    No medications on file       DISPOSITION  Admission       Cordell Vega MD  Resident  05/02/20 [id number]

## 2020-05-02 NOTE — ED PROVIDER NOTES
ED Attending Attestation Note     Date of evaluation: 5/2/2020    This patient was seen by the resident. I have seen and examined the patient, agree with the workup, evaluation, management and diagnosis. The care plan has been discussed. My assessment reveals male with chronic indwelling Iglesias presenting with scrotal pain and penile pain and swelling. On examination, his penis and scrotum are erythematous, and diffusely tender. There is no crepitus. There is a Iglesias draining cloudy urine. He has no abdominal tenderness to palpation.         Linda Leigh MD  05/02/20 [id number]

## 2020-05-02 NOTE — H&P
Procedure Laterality Date    BACK SURGERY  2006    lower lumbar    CORONARY ARTERY BYPASS GRAFT  12/13/2013    CABG x 5 (Dr Claritza Gonzalez)   [facility] N/A 1/10/2019    CYSTOSCOPY performed by Jeannine Miranda MD at [facility] [address] Right 5/1/2015    [facility]       Medications Prior to Admission:      Prior to Admission medications    Medication Sig Start Date End Date Taking? Authorizing Provider   [facility] OINT ointment Apply topically 2 times daily 3/20/20   Susan Aguayo MD   furosemide (LASIX) 20 MG tablet Take 1 tablet by mouth daily 3/20/20   Susan Aguayo MD   gabapentin (NEURONTIN) 600 MG tablet Take 300 mg by mouth 3 times daily. Historical Provider, MD   levothyroxine (SYNTHROID) 50 MCG tablet Take 50 mcg by mouth Daily    Historical Provider, MD   tamsulosin (FLOMAX) 0.4 MG capsule Take 0.4 mg by mouth daily    Historical Provider, MD   isosorbide mononitrate (IMDUR) 30 MG extended release tablet Take 1 tablet by mouth daily 1/17/20   Alize Au MD   ranitidine (ZANTAC) 300 MG tablet Take 300 mg by mouth nightly As needed    Historical Provider, MD   Melatonin 10 MG TABS Take 10 mg by mouth nightly as needed     Historical Provider, MD   ferrous sulfate 325 (65 Fe) MG tablet Take 325 mg by mouth daily (with breakfast)    Historical Provider, MD   niacin 500 MG tablet Take 1 tablet by mouth daily as needed (for spasticity)     Historical Provider, MD   aspirin 325 MG tablet Take 325 mg by mouth nightly     Historical Provider, MD   atorvastatin (LIPITOR) 80 MG tablet Take 80 mg by mouth nightly. Historical Provider, MD   zolpidem (AMBIEN) 10 MG tablet Take 10 mg by mouth nightly as needed for Sleep. Historical Provider, MD       Allergies:  Bactrim [sulfamethoxazole-trimethoprim]    Social History:    TOBACCO:   reports that he quit smoking about 50 years ago.  He has never used smokeless tobacco.  ETOH:   reports no history of alcohol 100   CO2 20*   BUN 39*   CREATININE 1.0   CALCIUM 8.7     No results for input(s): AST, ALT, BILIDIR, BILITOT, ALKPHOS in the last 72 hours. No results for input(s): INR in the last 72 hours. No results for input(s): Duwaine Diane in the last 72 hours. Urinalysis:      Lab Results   Component Value Date    NITRU POSITIVE 05/02/2020    WBCUA 6-10 05/02/2020    BACTERIA 1+ 05/02/2020    RBCUA 0-2 05/02/2020    BLOODU TRACE-INTACT 05/02/2020    SPECGRAV <=1.005 05/02/2020    GLUCOSEU Negative 05/02/2020       Radiology:     CXR: I have reviewed the CXR with the following interpretation: NA  EKG:  I have reviewed the EKG with the following interpretation: NA    CT ABDOMEN PELVIS W IV CONTRAST Additional Contrast? None   Final Result      1. Moderate bilateral hydronephrosis and hydroureter and mild urinary bladder distention. Iglesias catheter tip terminating in the region of the prostatic urethra and may not be draining. Recommend replacing or repositioning. 2.  Chronic bladder wall thickening with trabeculae representing sequelae of chronic bladder outlet obstruction. 3.  Small bilateral scrotal hydroceles. 4.  Stable loculated left pleural effusion versus empyema and left lower lobe round atelectasis. 5.  Cholelithiasis. 6.  Left nephrolithiasis. 7.  Retained stool in the colon. ASSESSMENT:    Active Hospital Problems    Diagnosis Date Noted    Cellulitis of scrotum [N49.2] 05/02/2020   - Suspected cellulitis of the scrotum  - Abnormal UA-possible colonization given the chronic indwelling Iglesias. History of MDRO  - Chronic diarrhea-? Overflow diarrhea given the CT findings of retained stool in the colon.   Low suspicion for C. difficile  - History of BPH  - Essential hypertension  - History of bilateral carotid artery stenosis  - Paroxysmal atrial fibrillation, currently in sinus rhythm  - Debility  - History of MRSA colonization    PLAN:  Continue vancomycin and Rocephin  Pain

## 2020-05-03 LAB
ANION GAP SERPL CALCULATED.3IONS-SCNC: 8 MMOL/L (ref 3–16)
BASOPHILS ABSOLUTE: 0.1 K/UL (ref 0–0.2)
BASOPHILS ABSOLUTE: 0.1 K/UL (ref 0–0.2)
BASOPHILS RELATIVE PERCENT: 0.8 %
BASOPHILS RELATIVE PERCENT: 1 %
BUN BLDV-MCNC: 35 MG/DL (ref 7–20)
C DIFF TOXIN/ANTIGEN: NORMAL
CALCIUM SERPL-MCNC: 8 MG/DL (ref 8.3–10.6)
CHLORIDE BLD-SCNC: 105 MMOL/L (ref 99–110)
CO2: 23 MMOL/L (ref 21–32)
CREAT SERPL-MCNC: 0.9 MG/DL (ref 0.8–1.3)
EOSINOPHILS ABSOLUTE: 0.4 K/UL (ref 0–0.6)
EOSINOPHILS ABSOLUTE: 0.5 K/UL (ref 0–0.6)
EOSINOPHILS RELATIVE PERCENT: 6.4 %
EOSINOPHILS RELATIVE PERCENT: 7.8 %
GFR AFRICAN AMERICAN: >60
GFR NON-AFRICAN AMERICAN: >60
GLUCOSE BLD-MCNC: 114 MG/DL (ref 70–99)
HCT VFR BLD CALC: 30.2 % (ref 40.5–52.5)
HCT VFR BLD CALC: 30.9 % (ref 40.5–52.5)
HCT VFR BLD CALC: 32.5 % (ref 40.5–52.5)
HEMOGLOBIN: 10 G/DL (ref 13.5–17.5)
HEMOGLOBIN: 10.2 G/DL (ref 13.5–17.5)
HEMOGLOBIN: 10.6 G/DL (ref 13.5–17.5)
LYMPHOCYTES ABSOLUTE: 0.9 K/UL (ref 1–5.1)
LYMPHOCYTES ABSOLUTE: 0.9 K/UL (ref 1–5.1)
LYMPHOCYTES RELATIVE PERCENT: 12.5 %
LYMPHOCYTES RELATIVE PERCENT: 13.4 %
MCH RBC QN AUTO: 30.8 PG (ref 26–34)
MCH RBC QN AUTO: 31 PG (ref 26–34)
MCHC RBC AUTO-ENTMCNC: 32.6 G/DL (ref 31–36)
MCHC RBC AUTO-ENTMCNC: 32.9 G/DL (ref 31–36)
MCV RBC AUTO: 94.1 FL (ref 80–100)
MCV RBC AUTO: 94.5 FL (ref 80–100)
MONOCYTES ABSOLUTE: 0.5 K/UL (ref 0–1.3)
MONOCYTES ABSOLUTE: 0.6 K/UL (ref 0–1.3)
MONOCYTES RELATIVE PERCENT: 7.5 %
MONOCYTES RELATIVE PERCENT: 8.1 %
NEUTROPHILS ABSOLUTE: 4.8 K/UL (ref 1.7–7.7)
NEUTROPHILS ABSOLUTE: 5 K/UL (ref 1.7–7.7)
NEUTROPHILS RELATIVE PERCENT: 69.9 %
NEUTROPHILS RELATIVE PERCENT: 72.6 %
OCCULT BLOOD DIAGNOSTIC: ABNORMAL
OCCULT BLOOD DIAGNOSTIC: NORMAL
PDW BLD-RTO: 16.2 % (ref 12.4–15.4)
PDW BLD-RTO: 16.3 % (ref 12.4–15.4)
PLATELET # BLD: 150 K/UL (ref 135–450)
PLATELET # BLD: 164 K/UL (ref 135–450)
PMV BLD AUTO: 9.1 FL (ref 5–10.5)
PMV BLD AUTO: 9.5 FL (ref 5–10.5)
POTASSIUM REFLEX MAGNESIUM: 4.2 MMOL/L (ref 3.5–5.1)
RBC # BLD: 3.29 M/UL (ref 4.2–5.9)
RBC # BLD: 3.44 M/UL (ref 4.2–5.9)
SARS-COV-2, NAAT: NOT DETECTED
SODIUM BLD-SCNC: 136 MMOL/L (ref 136–145)
WBC # BLD: 6.9 K/UL (ref 4–11)
WBC # BLD: 6.9 K/UL (ref 4–11)

## 2020-05-03 PROCEDURE — 80048 BASIC METABOLIC PNL TOTAL CA: CPT

## 2020-05-03 PROCEDURE — 1200000000 HC SEMI PRIVATE

## 2020-05-03 PROCEDURE — C9113 INJ PANTOPRAZOLE SODIUM, VIA: HCPCS | Performed by: INTERNAL MEDICINE

## 2020-05-03 PROCEDURE — 2580000003 HC RX 258: Performed by: INTERNAL MEDICINE

## 2020-05-03 PROCEDURE — 87324 CLOSTRIDIUM AG IA: CPT

## 2020-05-03 PROCEDURE — G0328 FECAL BLOOD SCRN IMMUNOASSAY: HCPCS

## 2020-05-03 PROCEDURE — 87449 NOS EACH ORGANISM AG IA: CPT

## 2020-05-03 PROCEDURE — 85025 COMPLETE CBC W/AUTO DIFF WBC: CPT

## 2020-05-03 PROCEDURE — 6360000002 HC RX W HCPCS: Performed by: INTERNAL MEDICINE

## 2020-05-03 PROCEDURE — 85018 HEMOGLOBIN: CPT

## 2020-05-03 PROCEDURE — 85014 HEMATOCRIT: CPT

## 2020-05-03 PROCEDURE — 36415 COLL VENOUS BLD VENIPUNCTURE: CPT

## 2020-05-03 PROCEDURE — 6370000000 HC RX 637 (ALT 250 FOR IP): Performed by: INTERNAL MEDICINE

## 2020-05-03 PROCEDURE — U0002 COVID-19 LAB TEST NON-CDC: HCPCS

## 2020-05-03 RX ORDER — PANTOPRAZOLE SODIUM 40 MG/10ML
40 INJECTION, POWDER, LYOPHILIZED, FOR SOLUTION INTRAVENOUS DAILY
Status: DISCONTINUED | OUTPATIENT
Start: 2020-05-03 | End: 2020-05-04

## 2020-05-03 RX ADMIN — ZOLPIDEM TARTRATE 5 MG: 5 TABLET ORAL at 00:25

## 2020-05-03 RX ADMIN — ZOLPIDEM TARTRATE 5 MG: 5 TABLET ORAL at 23:00

## 2020-05-03 RX ADMIN — VANCOMYCIN HYDROCHLORIDE 1500 MG: 10 INJECTION, POWDER, LYOPHILIZED, FOR SOLUTION INTRAVENOUS at 14:25

## 2020-05-03 RX ADMIN — CEFTRIAXONE 1 G: 1 INJECTION, POWDER, FOR SOLUTION INTRAMUSCULAR; INTRAVENOUS at 17:53

## 2020-05-03 RX ADMIN — Medication 10 MG: at 20:37

## 2020-05-03 RX ADMIN — ATORVASTATIN CALCIUM 80 MG: 80 TABLET, FILM COATED ORAL at 08:23

## 2020-05-03 RX ADMIN — PANTOPRAZOLE SODIUM 40 MG: 40 INJECTION, POWDER, FOR SOLUTION INTRAVENOUS at 20:48

## 2020-05-03 RX ADMIN — GABAPENTIN 600 MG: 600 TABLET, FILM COATED ORAL at 20:37

## 2020-05-03 RX ADMIN — ISOSORBIDE MONONITRATE 30 MG: 30 TABLET, EXTENDED RELEASE ORAL at 08:23

## 2020-05-03 RX ADMIN — FAMOTIDINE 20 MG: 20 TABLET ORAL at 08:24

## 2020-05-03 RX ADMIN — LEVOTHYROXINE SODIUM 50 MCG: 50 TABLET ORAL at 06:31

## 2020-05-03 ASSESSMENT — PAIN SCALES - GENERAL
PAINLEVEL_OUTOF10: 0

## 2020-05-03 NOTE — PROGRESS NOTES
Secure message sent to Dr. Rosalynn Buerger to notify him that patient had small black bowel movement this morning that was a smear. Clarified if patient was to get lovenox and aspirin. Notified him that occult stool was sent and result was back. New order received.

## 2020-05-03 NOTE — PROGRESS NOTES
This RN resuming care of pt. Bedside report done. Pt in bed and has no complaints. Skin assessed with previous nurse. Pt has redness to BLE. No open areas to BLE. Pt denies pain to legs. Feet are very swollen. Pt has abrasions scattered to bilateral legs and scattering bruising to bilateral arms. Pt's scrotum is red and swollen. Pt denies pain to the penis and scrotum. Pt has yellow drainage from the urethra. Urine is draining freely into the urine collection bag. Urine is yellow and cloudy. Pt denies needs at this time. Pt placed in c-diff precautions for rule out. Pt sates he has been having diarrhea for the past 1-2 weeks. Pt also states that he is normally continent of stool but he has been incontinent lately. Pt also states that his stools look black. Will check patient often and collect soot samples for testing. Special mattress ordered. Will transfer pt over when bed arrives. BLE in prevalon boots.

## 2020-05-03 NOTE — PROGRESS NOTES
Hospitalist Progress Note      PCP: INES BLANDON [address]    Date of Admission: 5/2/2020    Chief Complaint: scrotal swelling    HPI per admitting physician:  \"71 y.o. male who presents with complaints of swelling of the penis and scrotum. Patient has chronic indwelling Schneider catheter since January 2019 due to urinary retention. Patient has been having penile pain and discomfort over the past 24 to 48 hours with associated swelling of the penis and the scrotum. Patient states that he has been having diarrhea for 1-2 weeks with associated lower abdominal pain. States that the stools look really dark.     Patient was hospitalized in March, from [facility] with similar complaints- penile pain at Schneider catheter site. Schneider catheter was exchanged in ER at that time and patient was admitted for urology consultation and has been treated for a UTI.     Patient is unable to take care of himself and is supposed to have home health aide for assistance, which he did not have over the past several days. During his hospitalization in March patient was found to be soiled in his own stools. \"    Subjective:  Patient seen and examined at the bedside. No complaints at this time. He reports pain essentially resolved as soon as schneider came out.  Hgb dropped by 2 points after fluids, Black BMs overnight but FOBT negative, no other evidence of bleeding, VSS so this is likely dilutional.    PFHS: reviewed as documented 5/2/2020, no changes      Medications:  Reviewed    Infusion Medications   Scheduled Medications    cefTRIAXone (ROCEPHIN) IV  1 g Intravenous Q24H    atorvastatin  80 mg Oral Daily    isosorbide mononitrate  30 mg Oral Daily    levothyroxine  50 mcg Oral Daily    famotidine  20 mg Oral Daily    sodium chloride flush  10 mL Intravenous 2 times per day    [Held by provider] enoxaparin  40 mg Subcutaneous Daily    vancomycin  1,500 mg Intravenous Q18H    gabapentin  600 mg Oral Nightly    [Held by provider]

## 2020-05-03 NOTE — PROGRESS NOTES
Bedside report done with off going Rn. Skin assessed. Sacrum is less red now that pt is on a special mattress. Pt cleaned of incontinence. Stool soft and dark brown/black. Zinc cream liberally applied to the buttocks. Scrotum elevated on a towel and discharge from the urethra decreasing. Pt denies further needs at this time. Bed alarm on, wheels locked, bed in lowest position, side rails up 2/4, nonskid socks on, call light and bedside table in reach. Will continue to monitor.

## 2020-05-03 NOTE — PROGRESS NOTES
4 Eyes Admission Assessment     I agree as the admission nurse that 2 RN's have performed a thorough Head to Toe Skin Assessment on the patient. ALL assessment sites listed below have been assessed on admission. Areas assessed by both nurses:   [x]   Head, Face, and Ears   [x]   Shoulders, Back, and Chest  [x]   Arms, Elbows, and Hands   [x]   Coccyx, Sacrum, and Ischum  [x]   Legs, Feet, and Heels        Does the Patient have Skin Breakdown? Yes a wound was noted on the Admission Assessment and an LDA was Initiated documentation include the Usha-wound, Wound Assessment, Measurements, Dressing Treatment, Drainage, and Color\",    Excoriation/redness to coccyx/scrotum. Scattered abrasions to bilateral knees and R hip. Scattered bruising to BUE.          Terrance Prevention initiated:  Yes   Wound Care Orders initiated:  KEV      [facility] nurse consulted for Pressure Injury (Stage 3,4, Unstageable, DTI, NWPT, and Complex wounds):  NA      Nurse 1 eSignature: Electronically signed by Keerthi Rosario RN on 5/2/20 at 11:48 PM EDT    **SHARE this note so that the co-signing nurse is able to place an eSignature**    Nurse 2 eSignature: Electronically signed by Elinor Wang RN on 5/2/20 at 11:50 PM EDT

## 2020-05-03 NOTE — PROGRESS NOTES
Patient admitted to room 5522 at 2100 from ED. Patient is a/o x4. VSS. Patient denies complaints of nausea/pain. Non-skid socks on. Oriented patient to room and call light. Bed locked and in lowest positioned. Bedside table, personal belongings, and nurse call light within reach. Instructed patient to utilize call light for assistance. Bed alarm on. Will continue to monitor.

## 2020-05-03 NOTE — PROGRESS NOTES
Secure message sent to Dr. Radha Morales every time patient is turned he is inc. of formed bowel movement that is black and small. I turn him every 2 hours. This last time at the end of wiping him it was jelly like in appearance. At 1735 occult stool collected and taken to lab.

## 2020-05-04 LAB
EKG ATRIAL RATE: 44 BPM
EKG DIAGNOSIS: NORMAL
EKG P AXIS: 71 DEGREES
EKG P-R INTERVAL: 266 MS
EKG Q-T INTERVAL: 478 MS
EKG QRS DURATION: 96 MS
EKG QTC CALCULATION (BAZETT): 408 MS
EKG R AXIS: -11 DEGREES
EKG T AXIS: 64 DEGREES
EKG VENTRICULAR RATE: 44 BPM
HCT VFR BLD CALC: 33 % (ref 40.5–52.5)
HCT VFR BLD CALC: 34.1 % (ref 40.5–52.5)
HEMOGLOBIN: 10.5 G/DL (ref 13.5–17.5)
HEMOGLOBIN: 11.3 G/DL (ref 13.5–17.5)
ORGANISM: ABNORMAL
URINE CULTURE, ROUTINE: ABNORMAL

## 2020-05-04 PROCEDURE — 85018 HEMOGLOBIN: CPT

## 2020-05-04 PROCEDURE — 88342 IMHCHEM/IMCYTCHM 1ST ANTB: CPT

## 2020-05-04 PROCEDURE — 87493 C DIFF AMPLIFIED PROBE: CPT

## 2020-05-04 PROCEDURE — C9113 INJ PANTOPRAZOLE SODIUM, VIA: HCPCS | Performed by: INTERNAL MEDICINE

## 2020-05-04 PROCEDURE — 6370000000 HC RX 637 (ALT 250 FOR IP): Performed by: INTERNAL MEDICINE

## 2020-05-04 PROCEDURE — 1200000000 HC SEMI PRIVATE

## 2020-05-04 PROCEDURE — 7100000011 HC PHASE II RECOVERY - ADDTL 15 MIN: Performed by: INTERNAL MEDICINE

## 2020-05-04 PROCEDURE — 3609012400 HC EGD TRANSORAL BIOPSY SINGLE/MULTIPLE: Performed by: INTERNAL MEDICINE

## 2020-05-04 PROCEDURE — 6360000002 HC RX W HCPCS: Performed by: INTERNAL MEDICINE

## 2020-05-04 PROCEDURE — 99152 MOD SED SAME PHYS/QHP 5/>YRS: CPT | Performed by: INTERNAL MEDICINE

## 2020-05-04 PROCEDURE — 2709999900 HC NON-CHARGEABLE SUPPLY: Performed by: INTERNAL MEDICINE

## 2020-05-04 PROCEDURE — 87505 NFCT AGENT DETECTION GI: CPT

## 2020-05-04 PROCEDURE — 88305 TISSUE EXAM BY PATHOLOGIST: CPT

## 2020-05-04 PROCEDURE — 93010 ELECTROCARDIOGRAM REPORT: CPT | Performed by: INTERNAL MEDICINE

## 2020-05-04 PROCEDURE — 2580000003 HC RX 258: Performed by: INTERNAL MEDICINE

## 2020-05-04 PROCEDURE — 93005 ELECTROCARDIOGRAM TRACING: CPT | Performed by: INTERNAL MEDICINE

## 2020-05-04 PROCEDURE — 36415 COLL VENOUS BLD VENIPUNCTURE: CPT

## 2020-05-04 PROCEDURE — 85014 HEMATOCRIT: CPT

## 2020-05-04 PROCEDURE — 7100000010 HC PHASE II RECOVERY - FIRST 15 MIN: Performed by: INTERNAL MEDICINE

## 2020-05-04 PROCEDURE — 99153 MOD SED SAME PHYS/QHP EA: CPT | Performed by: INTERNAL MEDICINE

## 2020-05-04 PROCEDURE — 0DB68ZZ EXCISION OF STOMACH, VIA NATURAL OR ARTIFICIAL OPENING ENDOSCOPIC: ICD-10-PCS | Performed by: INTERNAL MEDICINE

## 2020-05-04 RX ORDER — FUROSEMIDE 10 MG/ML
20 INJECTION INTRAMUSCULAR; INTRAVENOUS DAILY
Status: DISCONTINUED | OUTPATIENT
Start: 2020-05-04 | End: 2020-05-06 | Stop reason: HOSPADM

## 2020-05-04 RX ORDER — MIDAZOLAM HYDROCHLORIDE 1 MG/ML
INJECTION INTRAMUSCULAR; INTRAVENOUS PRN
Status: DISCONTINUED | OUTPATIENT
Start: 2020-05-04 | End: 2020-05-04 | Stop reason: HOSPADM

## 2020-05-04 RX ORDER — HYDRALAZINE HYDROCHLORIDE 20 MG/ML
5 INJECTION INTRAMUSCULAR; INTRAVENOUS EVERY 6 HOURS PRN
Status: DISCONTINUED | OUTPATIENT
Start: 2020-05-04 | End: 2020-05-06 | Stop reason: HOSPADM

## 2020-05-04 RX ORDER — FENTANYL CITRATE 50 UG/ML
INJECTION, SOLUTION INTRAMUSCULAR; INTRAVENOUS PRN
Status: DISCONTINUED | OUTPATIENT
Start: 2020-05-04 | End: 2020-05-04 | Stop reason: HOSPADM

## 2020-05-04 RX ORDER — PANTOPRAZOLE SODIUM 40 MG/1
40 TABLET, DELAYED RELEASE ORAL
Status: DISCONTINUED | OUTPATIENT
Start: 2020-05-04 | End: 2020-05-06 | Stop reason: HOSPADM

## 2020-05-04 RX ADMIN — Medication 10 ML: at 07:55

## 2020-05-04 RX ADMIN — PIPERACILLIN AND TAZOBACTAM 3.38 G: 3; .375 INJECTION, POWDER, LYOPHILIZED, FOR SOLUTION INTRAVENOUS at 17:35

## 2020-05-04 RX ADMIN — PANTOPRAZOLE SODIUM 40 MG: 40 TABLET, DELAYED RELEASE ORAL at 16:13

## 2020-05-04 RX ADMIN — VANCOMYCIN HYDROCHLORIDE 1500 MG: 10 INJECTION, POWDER, LYOPHILIZED, FOR SOLUTION INTRAVENOUS at 07:50

## 2020-05-04 RX ADMIN — GABAPENTIN 600 MG: 600 TABLET, FILM COATED ORAL at 21:42

## 2020-05-04 RX ADMIN — Medication 10 ML: at 21:48

## 2020-05-04 RX ADMIN — PIPERACILLIN AND TAZOBACTAM 3.38 G: 3; .375 INJECTION, POWDER, LYOPHILIZED, FOR SOLUTION INTRAVENOUS at 09:27

## 2020-05-04 RX ADMIN — Medication 10 MG: at 21:46

## 2020-05-04 RX ADMIN — PANTOPRAZOLE SODIUM 40 MG: 40 INJECTION, POWDER, FOR SOLUTION INTRAVENOUS at 07:50

## 2020-05-04 RX ADMIN — ZOLPIDEM TARTRATE 5 MG: 5 TABLET ORAL at 22:59

## 2020-05-04 RX ADMIN — LEVOTHYROXINE SODIUM 50 MCG: 50 TABLET ORAL at 05:35

## 2020-05-04 RX ADMIN — FUROSEMIDE 20 MG: 10 INJECTION, SOLUTION INTRAMUSCULAR; INTRAVENOUS at 16:13

## 2020-05-04 ASSESSMENT — PAIN SCALES - GENERAL
PAINLEVEL_OUTOF10: 0

## 2020-05-04 ASSESSMENT — PAIN SCALES - WONG BAKER
WONGBAKER_NUMERICALRESPONSE: 0
WONGBAKER_NUMERICALRESPONSE: 0

## 2020-05-04 NOTE — PROGRESS NOTES
Consent for procedure on patient's chart, patient wants to speak with MD before signing. Will continue to monitor.

## 2020-05-04 NOTE — CONSULTS
adequate. BUN 39, creatinine 1. Urinalysis 6 to 10  white cells, nitrite positive. CT scan, hydronephrosis, cholelithiasis,  left nephrolithiasis, and colonic stool, bladder thickening, scrotal  hydroceles. IMPRESSION AND PLAN:  The patient will undergo diagnostic EGD to  evaluate for any upper GI bleeding. Has not taken iron reportedly in a  week. Aspirin and Lovenox _____. Further recommendations after  clinical correlation.         Brenda Hogan MD    D: 05/04/2020 11:58:16       T: 05/04/2020 13:17:06     [email or web address]  Job#: [id number]     Doc#: [id number]    CC:

## 2020-05-04 NOTE — CARE COORDINATION
Case Management Assessment           Initial Evaluation                Date / Time of Evaluation: 5/4/2020 4:27 PM                 Assessment Completed by: Fatmata Fraga     Spoke with patient at bedside regarding discharge plans. Pt lives at home and his daughter lives with him. He is active with [employer] [phone] and [facility] for aide services [phone] S-H-Lc-Fri-Sat. Pt's  with [employer] is Paco Peters [id number]. Pt has a WC and rollator at home as well and daughter can transport to home at d/c. Patient Name: Mariann Oconnell     YOB: 1940  Diagnosis: Cellulitis of scrotum [N49.2]  Cellulitis of scrotum [N49.2]     Date / Time: 5/2/2020  4:14 PM    Patient Admission Status: Inpatient    If patient is discharged prior to next notation, then this note serves as note for discharge by case management.      Current PCP: [address] [place] [address] Patient: No    Chart Reviewed: Yes  Patient/ Family Interviewed: Yes    Initial assessment completed at bedside with: patient    Hospitalization in the last 30 days: No    Emergency Contacts:  Extended Emergency Contact Information  Primary Emergency Contact: [place] [address] Phone: [phone]  Work Phone: [phone]  Mobile Phone: [phone]  Relation: Child  Secondary Emergency Contact: Dana Callahan  Address: [address]  Home Phone: [phone]  Relation: Other    Advance Directives:   Code Status: Full Code    Healthcare Power of : No  Agent: NA  Contact Number: NA    Financial  Payor: Basil Exon / Plan: Padmini Alexandra PLUS HMO / Product Type: *No Product type* /     Pre-cert required for SNF: Yes    [facility] - Mary Valero [phone] - F [phone]  [address] [place] [address]  Phone: [phone] Fax: [address] [address] [address]EL  [facility] [address]  Phone: [phone] Fax: [phone]      Potential assistance Purchasing Medications: Potential Assistance Purchasing Medications: No  Does Patient want to participate in local refill/ meds to beds program?: No    Meds To Beds General Rules:  1. Can ONLY be done Monday- Friday between 8:30am-5pm  2. Prescription(s) must be in pharmacy by 3pm to be filled same day  3. Copy of patient's insurance/ prescription drug card and patient face sheet must be sent along with the prescription(s)  4. Cost of Rx cannot be added to hospital bill. If financial assistance is needed, please contact unit  or ;  or  CANNOT provide pharmacy voucher for patients co-pays  5. Patients can then  the prescription on their way out of the hospital at discharge, or pharmacy can deliver to the bedside if staff is available. (payment due at time of pick-up or delivery - cash, check, or card accepted)     Able to afford home medications/ co-pay costs: Yes    ADLS  Support Systems: Children, Home Care Staff    PT AM-PAC:   /24  OT AM-PAC:   /24    [place]: single family home  Steps:NA    Plans to RETURN to current housing: Yes  Barriers to RETURNING to current housing: none noted    Lilo Orozco  Currently ACTIVE with [address]: [employer]  Phone: [phone]  Fax: [phone]    Currently ACTIVE with [place] on Aging: Yes  Passport/ Waiver: Yes  Passport/ Waiver Services: [address] for 2 hours/ day for 5 days per week    : Mary Cole Phone: [phone]      DISCHARGE PLAN:  Disposition: Home with [address]:  [place]     Transportation PLAN for discharge: family     Factors facilitating achievement of predicted outcomes: Family support, Cooperative and Pleasant    Barriers to discharge: not medically ready    Additional Case Management Notes: NA    The

## 2020-05-04 NOTE — H&P
History and Physical / Pre-Sedation Assessment    Jeffry Rowan is a 67 y.o. male who presents today for EGD procedure. PMHx:    Past Medical History:   Diagnosis Date    BPH (benign prostatic hyperplasia)     Carotid stenosis 12/2013    JESU 64-73% stenosis; LICA 10-01% stenosis    Cellulitis 12/2013    LLE    Chronic back pain     Diverticular disease     Erectile dysfunction     GERD (gastroesophageal reflux disease)     History of atrial fibrillation     Hypercholesteremia     Hypertension     Lower GI bleed     MDRO (multiple drug resistant organisms) resistance 10/26/2019    urine    Neuromuscular disorder (HCC)     spasticity    Renal insufficiency     Risk for falls     uses walker    Tinea corporis 12/2013    LLE    Vitamin B12 deficiency        Medications:    Prior to Admission medications    Medication Sig Start Date End Date Taking? Authorizing Provider   Balsam [place] Oil [facility]) OINT ointment Apply topically 2 times daily 3/20/20  Yes Lawyer Maryjane MD   furosemide (LASIX) 20 MG tablet Take 1 tablet by mouth daily 3/20/20  Yes Lawyer Maryjane MD   gabapentin (NEURONTIN) 600 MG tablet Take 600 mg by mouth nightly.     Yes Historical Provider, MD   levothyroxine (SYNTHROID) 50 MCG tablet Take 50 mcg by mouth Daily   Yes Historical Provider, MD   tamsulosin (FLOMAX) 0.4 MG capsule Take 0.4 mg by mouth daily   Yes Historical Provider, MD   isosorbide mononitrate (IMDUR) 30 MG extended release tablet Take 1 tablet by mouth daily 1/17/20  Yes Emerson Aleman MD   ranitidine (ZANTAC) 300 MG tablet Take 300 mg by mouth nightly As needed   Yes Historical Provider, MD   Melatonin 10 MG TABS Take 10 mg by mouth nightly as needed    Yes Historical Provider, MD   ferrous sulfate 325 (65 Fe) MG tablet Take 325 mg by mouth daily (with breakfast)   Yes Historical Provider, MD   niacin 500 MG tablet Take 1 tablet by mouth daily as needed (for spasticity)    Yes Historical Provider, MD aspirin 325 MG tablet Take 325 mg by mouth nightly    Yes Historical Provider, MD   atorvastatin (LIPITOR) 80 MG tablet Take 80 mg by mouth nightly. Yes Historical Provider, MD   zolpidem (AMBIEN) 10 MG tablet Take 10 mg by mouth nightly as needed for Sleep. Yes Historical Provider, MD       Allergies:    Allergies   Allergen Reactions    Bactrim [Sulfamethoxazole-Trimethoprim] Rash       PSHx:    Past Surgical History:   Procedure Laterality Date    BACK SURGERY  2006    lower lumbar    CORONARY ARTERY BYPASS GRAFT  2013    CABG x 5 (Dr Satish Oleary)    CYSTOSCOPY N/A 1/10/2019    CYSTOSCOPY performed by Valentín Bustos MD at [facility] [address] 2015    [facility]       Social Hx:    Social History     Socioeconomic History    Marital status:      Spouse name: Not on file    Number of children: Not on file    Years of education: Not on file    Highest education level: Not on file   Occupational History    Not on file   Social Needs    Financial resource strain: Not on file    Food insecurity     Worry: Not on file     Inability: Not on file    Transportation needs     Medical: Not on file     Non-medical: Not on file   Tobacco Use    Smoking status: Former Smoker     Last attempt to quit: 1969     Years since quittin.4    Smokeless tobacco: Never Used   Substance and Sexual Activity    Alcohol use: No    Drug use: No    Sexual activity: Never   Lifestyle    Physical activity     Days per week: Not on file     Minutes per session: Not on file    Stress: Not on file   Relationships    Social connections     Talks on phone: Not on file     Gets together: Not on file     Attends Holiness service: Not on file     Active member of club or organization: Not on file     Attends meetings of clubs or organizations: Not on file     Relationship status: Not on file    Intimate partner violence     Fear of current or ex partner: Not on file

## 2020-05-04 NOTE — PROGRESS NOTES
(SCr 0.9). Will continue current dose of vancomycin. · Vancomycin trough ordered before next dose. Target trough ~15 mcg/mL. · Will monitor renal function closely - pt has hx of ALIS. · Renal function will be monitored closely /dose will be adjusted as appropriate. Please call with any questions.   Pearl BurtonD, BCPS  Wireless: [phone]  or [phone]  5/4/2020 12:23 PM

## 2020-05-04 NOTE — PROGRESS NOTES
Received a call from Dr. Reymundo Chowdhury. Pt to get EGD tomorrow. Asked to place NPO @ [place] orders.  Pt informed and is in agreement

## 2020-05-05 LAB
A/G RATIO: 0.8 (ref 1.1–2.2)
ALBUMIN SERPL-MCNC: 2.9 G/DL (ref 3.4–5)
ALP BLD-CCNC: 176 U/L (ref 40–129)
ALT SERPL-CCNC: 21 U/L (ref 10–40)
ANION GAP SERPL CALCULATED.3IONS-SCNC: 9 MMOL/L (ref 3–16)
AST SERPL-CCNC: 21 U/L (ref 15–37)
BILIRUB SERPL-MCNC: 0.3 MG/DL (ref 0–1)
BUN BLDV-MCNC: 26 MG/DL (ref 7–20)
C. DIFFICILE TOXIN MOLECULAR: NORMAL
CALCIUM SERPL-MCNC: 8.5 MG/DL (ref 8.3–10.6)
CHLORIDE BLD-SCNC: 104 MMOL/L (ref 99–110)
CO2: 24 MMOL/L (ref 21–32)
CREAT SERPL-MCNC: 1 MG/DL (ref 0.8–1.3)
GFR AFRICAN AMERICAN: >60
GFR NON-AFRICAN AMERICAN: >60
GI BACTERIAL PATHOGENS BY PCR: NORMAL
GLOBULIN: 3.7 G/DL
GLUCOSE BLD-MCNC: 98 MG/DL (ref 70–99)
HCT VFR BLD CALC: 35.1 % (ref 40.5–52.5)
HEMOGLOBIN: 11.5 G/DL (ref 13.5–17.5)
POTASSIUM SERPL-SCNC: 4.8 MMOL/L (ref 3.5–5.1)
SODIUM BLD-SCNC: 137 MMOL/L (ref 136–145)
TOTAL PROTEIN: 6.6 G/DL (ref 6.4–8.2)

## 2020-05-05 PROCEDURE — 2580000003 HC RX 258: Performed by: INTERNAL MEDICINE

## 2020-05-05 PROCEDURE — 97166 OT EVAL MOD COMPLEX 45 MIN: CPT

## 2020-05-05 PROCEDURE — 97535 SELF CARE MNGMENT TRAINING: CPT

## 2020-05-05 PROCEDURE — 6370000000 HC RX 637 (ALT 250 FOR IP): Performed by: INTERNAL MEDICINE

## 2020-05-05 PROCEDURE — 97530 THERAPEUTIC ACTIVITIES: CPT

## 2020-05-05 PROCEDURE — 1200000000 HC SEMI PRIVATE

## 2020-05-05 PROCEDURE — 6360000002 HC RX W HCPCS: Performed by: INTERNAL MEDICINE

## 2020-05-05 PROCEDURE — 97162 PT EVAL MOD COMPLEX 30 MIN: CPT

## 2020-05-05 PROCEDURE — 85014 HEMATOCRIT: CPT

## 2020-05-05 PROCEDURE — 36415 COLL VENOUS BLD VENIPUNCTURE: CPT

## 2020-05-05 PROCEDURE — 80053 COMPREHEN METABOLIC PANEL: CPT

## 2020-05-05 PROCEDURE — 85018 HEMOGLOBIN: CPT

## 2020-05-05 RX ORDER — PANTOPRAZOLE SODIUM 40 MG/1
40 TABLET, DELAYED RELEASE ORAL
Qty: 30 TABLET | Refills: 3 | Status: SHIPPED | OUTPATIENT
Start: 2020-05-06 | End: 2020-05-06

## 2020-05-05 RX ORDER — HEPARIN SODIUM 5000 [USP'U]/ML
5000 INJECTION, SOLUTION INTRAVENOUS; SUBCUTANEOUS EVERY 8 HOURS SCHEDULED
Status: DISCONTINUED | OUTPATIENT
Start: 2020-05-05 | End: 2020-05-05

## 2020-05-05 RX ORDER — AMOXICILLIN AND CLAVULANATE POTASSIUM 875; 125 MG/1; MG/1
1 TABLET, FILM COATED ORAL 2 TIMES DAILY
Qty: 14 TABLET | Refills: 0 | Status: SHIPPED | OUTPATIENT
Start: 2020-05-05 | End: 2020-05-06 | Stop reason: SDUPTHER

## 2020-05-05 RX ORDER — LACTOBACILLUS RHAMNOSUS GG 10B CELL
1 CAPSULE ORAL DAILY
Status: DISCONTINUED | OUTPATIENT
Start: 2020-05-05 | End: 2020-05-06 | Stop reason: HOSPADM

## 2020-05-05 RX ADMIN — PANTOPRAZOLE SODIUM 40 MG: 40 TABLET, DELAYED RELEASE ORAL at 06:22

## 2020-05-05 RX ADMIN — LEVOTHYROXINE SODIUM 50 MCG: 50 TABLET ORAL at 06:22

## 2020-05-05 RX ADMIN — PIPERACILLIN AND TAZOBACTAM 3.38 G: 3; .375 INJECTION, POWDER, LYOPHILIZED, FOR SOLUTION INTRAVENOUS at 02:07

## 2020-05-05 RX ADMIN — GABAPENTIN 600 MG: 600 TABLET, FILM COATED ORAL at 20:16

## 2020-05-05 RX ADMIN — Medication 1 CAPSULE: at 23:00

## 2020-05-05 RX ADMIN — ENOXAPARIN SODIUM 40 MG: 40 INJECTION SUBCUTANEOUS at 13:29

## 2020-05-05 RX ADMIN — ATORVASTATIN CALCIUM 80 MG: 80 TABLET, FILM COATED ORAL at 11:06

## 2020-05-05 RX ADMIN — PIPERACILLIN AND TAZOBACTAM 3.38 G: 3; .375 INJECTION, POWDER, LYOPHILIZED, FOR SOLUTION INTRAVENOUS at 11:06

## 2020-05-05 RX ADMIN — Medication 10 MG: at 20:16

## 2020-05-05 RX ADMIN — ZOLPIDEM TARTRATE 5 MG: 5 TABLET ORAL at 22:58

## 2020-05-05 RX ADMIN — FUROSEMIDE 20 MG: 10 INJECTION, SOLUTION INTRAMUSCULAR; INTRAVENOUS at 11:06

## 2020-05-05 RX ADMIN — Medication 10 ML: at 11:06

## 2020-05-05 RX ADMIN — PIPERACILLIN AND TAZOBACTAM 3.38 G: 3; .375 INJECTION, POWDER, LYOPHILIZED, FOR SOLUTION INTRAVENOUS at 17:41

## 2020-05-05 ASSESSMENT — PAIN SCALES - GENERAL: PAINLEVEL_OUTOF10: 0

## 2020-05-05 NOTE — PROGRESS NOTES
Physical Therapy    Facility/Department: [facility] 5T ORTHO/NEURO  Initial Assessment / treatment    NAME: Fredis Edwards  : 1940  MRN: [id number]    Date of Service: 2020    Discharge Recommendations: Fredis Edwards scored a  on the AM-PAC short mobility form. Current research shows that an AM-PAC score of 17 or less is typically not associated with a discharge to the patient's home setting. Based on the patients AM-PAC score and their current functional mobility deficits, it is recommended that the patient have 3-5 sessions per week of Physical Therapy at d/c to increase the patients independence. SEE ASSESSMENT BELOW    If patient discharges prior to next session this note will serve as a discharge summary. Please see below for the latest assessment towards goals. PT Equipment Recommendations  Equipment Needed: No    Assessment   Body structures, Functions, Activity limitations: Decreased functional mobility   Assessment: Pt is 67 y.o. male admit from home with cellulitis of scrotum and UTI. Pt found to be incontinent of large amount of stool today (pt unaware) and required total assist for pericare. Pt requires min A for stand pivot transfer today which is just slightly below his functional baseline. Pt has HHA 1hr/day 6 days per week. Rec increased HHA frequency to 2-3x/day to assist with pericare as needed for optimal skin integrity. If increased HHA services not available, rec continued IP PT to maximize independence with functional mobility. Will follow. Treatment Diagnosis: impaired functional transfers  Prognosis: Good  Decision Making: Medium Complexity  PT Education: PT Role;Transfer Training;Functional Mobility Training  Patient Education: pt demonstrated understanding  REQUIRES PT FOLLOW UP: Yes       Patient Diagnosis(es): The primary encounter diagnosis was Cellulitis of scrotum. A diagnosis of Chronic indwelling Iglesias catheter was also pertinent to this visit.      has a

## 2020-05-05 NOTE — PROGRESS NOTES
gallops, bilateral pedal edema   Abdomen: Soft, non-tender, non-distended, no hepatosplenomegaly  Skin: Normal skin color, texture, turgor. No rashes or lesions noted, swelling and erythema of the scrotum: improving with creases noted. Psychiatric: Alert and oriented x4, good insight and judgment      Labs:   Recent Labs     05/02/20  1707 05/03/20  0511 05/03/20  0854 05/03/20  2214 05/04/20  1013   WBC 9.6 6.9 6.9  --   --    HGB 12.1* 10.2* 10.6* 10.0* 10.5*   HCT 36.7* 30.9* 32.5* 30.2* 33.0*    150 164  --   --      Recent Labs     05/02/20  1707 05/03/20  0511   * 136   K 4.3 4.2    105   CO2 20* 23   BUN 39* 35*   CREATININE 1.0 0.9   CALCIUM 8.7 8.0*     No results for input(s): AST, ALT, BILIDIR, BILITOT, ALKPHOS in the last 72 hours. No results for input(s): INR in the last 72 hours. No results for input(s): Marcha Ariane in the last 72 hours. Urinalysis:      Lab Results   Component Value Date    NITRU POSITIVE 05/02/2020    WBCUA 6-10 05/02/2020    BACTERIA 1+ 05/02/2020    RBCUA 0-2 05/02/2020    BLOODU TRACE-INTACT 05/02/2020    SPECGRAV <=1.005 05/02/2020    GLUCOSEU Negative 05/02/2020       Radiology:  CT ABDOMEN PELVIS W IV CONTRAST Additional Contrast? None   Final Result      1. Moderate bilateral hydronephrosis and hydroureter and mild urinary bladder distention. Iglesias catheter tip terminating in the region of the prostatic urethra and may not be draining. Recommend replacing or repositioning. 2.  Chronic bladder wall thickening with trabeculae representing sequelae of chronic bladder outlet obstruction. 3.  Small bilateral scrotal hydroceles. 4.  Stable loculated left pleural effusion versus empyema and left lower lobe round atelectasis. 5.  Cholelithiasis. 6.  Left nephrolithiasis. 7.  Retained stool in the colon.                Assessment/Plan:  67 y.o. male who presents with hx of BPH and chronic urinary retention, planned for [facility]; chronic spasticity with chronic back pain on baclofen pump, hx of Afib, HTN, MDR UTI, who presented with complaints of swelling of the penis and scrotum. # Cellulitis of scrotum  -Likely sec to urinary retention with poorly functioning Iglesias catheter  -Pt had been planned for suprapubic catheter but delayed due to COVID pandemic  - Iglesias catheter ?replaced and properly re-inserted.  -Placed on Vancomycin, ceftriaxone: Urine culture growing MDR proteus Penneri: Will switch antibx to zosyn and plan DC on augmentin  - Scrotal elevation  - Urology consulted  - O/p f/u with urology. # UTI: POA  - Sec to acute on chronic urinary retention  - Urine culture growing MDR proteus Penneri  - Will switch antibx to zosyn and plan DC on augmentin      # Acute on chronic urinary retention with Moderate bilateral hydronephrosis and hydroureter  - Iglesias catheter  - O/p Urology f/u with plans for [facility]      # Acute on chronic normocytic anemia  - Drop in hg of abt 2 poins  - S/p EGD: Gastric polyp, Hiatal hernia with mild gastritis without bleeding. - Protonix 40mg QD   - GI will call with pathology   - Per GI: -Hold asa, lovenox for now, just in case  - Check hgb q12h, if ok restart above in AM        # Chronic diarrhea  -Likely overflow diarrhea given CT findings of constipation  -c-diff sent: indeterminate  - Carrington Mcdonough forming up  - Follow repeat C diff testing result. #. Chronic loculated left pleural effusion versus empyema   - Stable  - Will discuss CT with radiology      # HTN  Stable, continue current regimen      #. LE edema  - Appears chronic  - Previous, recent dopplers noted. - On home lasix: will switch to IV while in hospital.   - Compression stockings/ACE wraps      # pAF  -Holding aspirin: Possible resume if hgb stable, tomorrow. # Debility  -PT/OT consult        DVT Prophylaxis: SCDs: If hg stable, chemical prophylaxis from tomorrow.     Diet: DIET GENERAL;  Code Status: Full Code    PT/OT Eval Status:

## 2020-05-05 NOTE — PROGRESS NOTES
Hospitalist Progress Note      PCP: INES BLANDON [address]    Date of Admission: 5/2/2020    Chief Complaint: scrotal swelling    HPI per admitting physician:  \"71 y.o. male who presents with hx of BPH and chronic urinary retention, planned for [facility]; chronic spasticity with chronic back pain on baclofen pump, hx of Afib, HTN, MDR UTI, who presented with complaints of swelling of the penis and scrotum. Patient has chronic indwelling Iglesias catheter since January 2019 due to urinary retention. Patient has been having penile pain and discomfort over the past 24 to 48 hours with associated swelling of the penis and the scrotum. Patient states that he has been having diarrhea for 1-2 weeks with associated lower abdominal pain. States that the stools look really dark.     Patient was hospitalized in March, from [address] with similar complaints- penile pain at Iglesias catheter site. Iglesias catheter was exchanged in ER at that time and patient was admitted for urology consultation and has been treated for a UTI.     Patient is unable to take care of himself and is supposed to have home health aide for assistance, which he did not have over the past several days. During his hospitalization in March patient was found to be soiled in his own stools. \"        Subjective:  Patient seen and examined at the bedside. No complaints at this time. Stool consistency improving    No fever/chills  Scrotal erythema abd swelling improving        PFHS: reviewed as documented 5/2/2020, no changes      Medications:  Reviewed      Physical Exam Performed:    /71   Pulse 62   Temp 98.7 °F (37.1 °C) (Oral)   Resp 16   Ht 5' 11\" (1.803 m)   Wt 218 lb 14.7 oz (99.3 kg)   SpO2 95%   BMI 30.53 kg/m²     General appearance:  No apparent distress, appears stated age  Respiratory:  Normal respiratory effort.  Clear to auscultation bilaterally  Cardiovascular: Regular rate and rhythm, nl S1/S2, w/o murmurs, rubs or gallops, bilateral DIET GENERAL;  Code Status: Full Code      Disposition: PT/OT eval for disposition. SNF vs  [facility]. Discussed with : SNF referral made as pxt and family feel he is too weak to return home. Awaiting final disposition per Case mger. To DC Home with Home health if not going to SNF today, or within 24 hrs.           Marissa Lew MD

## 2020-05-05 NOTE — DISCHARGE SUMMARY
Hospital Medicine Discharge Summary    Patient ID: Kavin Mcnair      Patient's PCP: Shady Whiting    Admit Date: 5/2/2020     Discharge Date:  5/6/2020    Admitting Physician: Rajinder Villanueva MD     Discharge Physician: Keith Donis MD     Discharge Diagnoses:   As below. The patient was seen and examined on day of discharge and this discharge summary is in conjunction with any daily progress note from day of discharge. Hospital Course:   \"71 y. o. male who presents with hx of BPH and chronic urinary retention, planned for [facility]; chronic spasticity with chronic back pain on baclofen pump, hx of Afib, HTN, MDR UTI, who presented with complaints of swelling of the penis and scrotum.  Patient has chronic indwelling Iglesias catheter since January 2019 due to urinary retention. Eliana Logan has been having penile pain and discomfort over the past 24 to 48 hours with associated swelling of the penis and the scrotum.  Patient states that he has been having diarrhea for 1-2 weeks with associated lower abdominal pain.  States that the stools look really dark.     Patient was hospitalized in March, from [address] [place] with similar complaints- penile pain at Iglesias catheter site. Iglesias catheter was exchanged in ER at that time and patient was admitted for urology consultation and has been treated for a UTI.     Patient is unable to take care of himself and is supposed to have home health aide for assistance, which he did not have over the past several days.  During his hospitalization in March patient was found to be soiled in his own stools. \"            # Cellulitis of scrotum  -Likely sec to urinary retention with poorly functioning Iglesias catheter  -Pt had been planned for suprapubic catheter but delayed due to [place] pandemic  - Urology consulted  - Iglesias catheter adjusted to ensure satisfactory drainage.   -Placed on Vancomycin, ceftriaxone: Urine culture growing MDR proteus Penneri: Switched antibx to zosyn and plan DC on augmentin to complete treatment course. - Scrotal elevation  - O/p f/u with urology.            # UTI: POA  - Sec to acute on chronic urinary retention from BPH and chronic Iglesias; nonfunctional Iglesias. - CT shows evidence of chronic urinary retention  - Urine culture growing MDR proteus Penneri  - Switched antibx to zosyn to DC on augmentin based on sensitivity data.           # Acute on chronic urinary retention with Moderate bilateral hydronephrosis and hydroureter  - Iglesias catheter for continuous bladder drainage.   - O/p Urology f/u with plans for [facility]          # Acute on chronic normocytic anemia  - Drop in hg of abt 2 poins  - S/p EGD: Gastric polyp, Hiatal hernia with mild gastritis without bleeding. - Protonix 40mg QD   - GI will call with pathology   - Resumed ASA  - Pxt stating he still has some dark stools, and stating he needs a colonoscopy, even though hgb has been really stable, and further w/u was not felt needed by GI.   - We will order a re-check of hgb in 3-5 days: to be drawn by Home Care if possible, result to PCP and GI to ensure remains stable. - Pxt on full strength Asa at home. Has been on 81 mg ASA here. Will discharge on 81 mg. May consider return to previous dose, if ok with GI/hgb remains stable/no further concerns for bleed.         # Chronic diarrhea  - Likely overflow diarrhea given CT findings of constipation  - C-diff negative. - Stool forming up       #. Chronic loculated left pleural effusion versus empyema   - Stable, chronic  - O/p f/u as before.         # HTN  Stable, continue current regimen        #. LE edema  - Appears chronic  - Previous, recent dopplers noted. - On home lasix: will switch to IV while in hospital.   - Compression stockings/ACE wraps        # PAF  - Noted earlier to have sinus bradycardia HR initially in the 40s with 1 deg AVB. Asymptomatic.   - HR now in the 60s. - Pxt on full strength ASA at home. Has been on 81 mg ASA here.

## 2020-05-05 NOTE — DISCHARGE INSTR - COC
Nurse Assessment:  Last Vital Signs: /68   Pulse 69   Temp 97.6 °F (36.4 °C) (Oral)   Resp 16   Ht 5' 11\" (1.803 m)   Wt 218 lb 14.7 oz (99.3 kg)   SpO2 96%   BMI 30.53 kg/m²     Last documented pain score (0-10 scale): Pain Level: 0  Last Weight:   Wt Readings from Last 1 Encounters:   05/04/20 218 lb 14.7 oz (99.3 kg)     Mental Status:  {IP PT MENTAL STATUS:20030}    IV Access:  { AZRA IV ACCESS:870406447}    Nursing Mobility/ADLs:  Walking   {[facility] DME UMBN:749024649}  Transfer  {[facility] DME GHWW:468315747}  Bathing  {[facility] DME MTOP:889242646}  Dressing  {[facility] DME OWJS:858483917}  Toileting  {[facility] DME CLAM:388449533}  Feeding  {[facility] DME VPCK:873555410}  Med Admin  {[facility] DME IGGI:748400291}  Med Delivery   {[facility] MED Delivery:015828363}    Wound Care Documentation and Therapy:  Wound 11/19/19 Knee Anterior;Right dry thick intact scab (Active)   Number of days: 167       Wound 03/18/20 Buttocks Stage 2 bilateral buttocks (Active)   Number of days: 47       Wound 03/19/20 Heel Left dry, closed with scabbing (Active)   Number of days: 47        Elimination:  Continence:   · Bowel: {YES / UN:06336}  · Bladder: {YES / BM:74745}  Urinary Catheter: {Urinary Catheter:930612082}   Colostomy/Ileostomy/Ileal Conduit: {YES / WQ:62231}       Date of Last BM: ***    Intake/Output Summary (Last 24 hours) at 5/5/2020 1351  Last data filed at 5/5/2020 1328  Gross per 24 hour   Intake 1450 ml   Output 6375 ml   Net -4925 ml     I/O last 3 completed shifts:   In: 1 [P.O.:960; I.V.:10]  Out: 5300 [Urine:5300]    Safety Concerns:     [address] Safety Concerns:207230445}    Impairments/Disabilities:      { AZRA Impairments/Disabilities:861717356}    Nutrition Therapy:  Current Nutrition Therapy:   { AZRA Diet List:265583001}    Routes of Feeding: {[facility]P DME Other Feedings:234039115}  Liquids: {Slp liquid thickness:63362}  Daily Fluid Restriction: {CHP DME Yes amt example:647238035}  Last Modified Barium Swallow with Video (Video Swallowing Test): {Done Not Done [facility]:724042542}    Treatments at the Time of Hospital Discharge:   Respiratory Treatments: ***  Oxygen Therapy:  {Therapy; copd oxygen:99153}  Ventilator:    {[facility] Vent VIRA:727428403}    Rehab Therapies: {THERAPEUTIC INTERVENTION:4389608261}  Weight Bearing Status/Restrictions: {[facility] Weight Bearin}  Other Medical Equipment (for information only, NOT a DME order):  {EQUIPMENT:562632007}  Other Treatments: ***    Patient's personal belongings (please select all that are sent with patient):  {CHP DME Belongings:231974648}    RN SIGNATURE:  {Esignature:079062444}    CASE MANAGEMENT/SOCIAL WORK SECTION    Inpatient Status Date: ***    Readmission Risk Assessment Score:  Readmission Risk              Risk of Unplanned Readmission:        36           Discharging to Facility/ Agency   · Name:   · Address:  · Phone:  · Fax:    Dialysis Facility (if applicable)   · Name:  · Address:  · Dialysis Schedule:  · Phone:  · Fax:    / signature: {Esignature:540749151}    PHYSICIAN SECTION    Prognosis: Fair    Condition at Discharge: Stable    Rehab Potential (if transferring to Rehab): Fair    Recommended Labs or Other Treatments After Discharge:   1. CBC next week  2. Follow up with Urology      Physician Certification: I certify the above information and transfer of Chel Vergara  is necessary for the continuing treatment of the diagnosis listed and that he requires [place] for greater 30 days. Update Admission H&P: No change in H&P. Please see discharge summary.      PHYSICIAN SIGNATURE:  Electronically signed by Bang Farmer MD on 20 at 1:52 PM EDT

## 2020-05-05 NOTE — PROGRESS NOTES
Occupational Therapy   Occupational Therapy Initial Assessment/Tx Note  Date: 2020   Patient Name: Pepito Gee  MRN: [id number]     : 1940    Date of Service: 2020  Assessment: Functional independence is near most recent baseline, but pt demonstrate unsafe transfers and is dependent for toileting due to incontinence of bowels. Despite education, pt lacks insight into his need for more assist and modifications to his toileting routine. Pt strongly desires to go home, in which case he should have more frequent check ins from home health to address toileting/skin care and continued therapy to improve transfer safety. If unable to increase home care, recommend continued rehab. Discharge Recommendations: Pepito Gee scored a 15/24 on the AM-PAC ADL Inpatient form. Current research shows that an AM-PAC score of 17 or less is typically not associated with a discharge to the patient's home setting. Based on the patients AM-PAC score and their current ADL deficits, it is recommended that the patient have 3-5 sessions per week of Occupational Therapy at d/c to increase the patients independence. OT Equipment Recommendations  Equipment Needed: No    Assessment   Performance deficits / Impairments: Decreased functional mobility ; Decreased ADL status; Decreased endurance  Treatment Diagnosis: Decreased activity tolerance, impaired ADLs and mobility  Decision Making: Medium Complexity  REQUIRES OT FOLLOW UP: Yes  Activity Tolerance  Activity Tolerance: Patient Tolerated treatment well  Safety Devices  Safety Devices in place: Yes  Type of devices: Call light within reach; Left in chair;Chair alarm in place;Nurse notified        Treatment Diagnosis: Decreased activity tolerance, impaired ADLs and mobility      Restrictions  Position Activity Restriction  Other position/activity restrictions: up with assist    Subjective   General  Chart Reviewed:  Yes  Additional Pertinent Hx: Admit  with penile pain, urinary retention, and diarrhea, found to have cellulitis of scrotum, UTI due to poorly functioning cathter; PMHx: HTN, tinea corporis L LE, spasticity, a-fib, back pain, R hip ORIF, CABG x 5, back surgery  Family / Caregiver Present: No  Referring Practitioner: Nabil  Diagnosis: cellulitis of scrotum  Subjective  Subjective: Pt in bed on entry. Cooperative with therapy. Unaware he had a large bowel movement. \"I can't believe I didn't know. \" Though, reports he often does not know at home and requires clean up by aides.     Patient Currently in Pain: Denies  Pain Assessment  Pain Assessment: 0-10(no c/o pain)    Social/Functional History  Social/Functional History  Lives With: Daughter  Type of Home: House  Home Layout: One level  Home Access: Ramped entrance  Bathroom Shower/Tub: (sponge bathes)  Bathroom Toilet: (BSC)  Bathroom Equipment: 3-in-1 commode(transfers to / then goes to [facility])  Home Equipment: Wheelchair-manual, Lift chair(sleeps in lift chair)  ADL Assistance: Needs assistance(aide for sponge baths 1x/week, can usually do dressing but usually has assist of aide or nurse)  Homemaking Assistance: Needs assistance(HHA and dtr perform)  Ambulation Assistance: (non-ambulatory at baseline)  Transfer Assistance: Independent(stand pivot)  Additional Comments: HHA 5days/week (HHA all days but Wednesdsay 1 hr a day), home nurse (M, W, F 1 hr a day); pt reports he is normally able to stand for up to 5 min at [employer] as LE strengthening exercise     Objective        Orientation  Overall Orientation Status: Within Functional Limits     Balance  Sitting Balance: Supervision  Standing Balance: Minimal assistance(attempted to walker for pericare, pt unable to clear bottom from mattress, though came to a full, stooped stand during transfer with BUE support of chair)  ADL  Feeding: Independent  Grooming: Modified independent ;Setup  LE Dressing: Dependent/Total  Toileting: Dependent/Total(large bowel accident,

## 2020-05-05 NOTE — PROGRESS NOTES
Pt is alert and oriented. Iglesias in place, draining adequately. Pt repositioned frequently, pt had 2 loose to formed dark stools this shift. VSS. Pt tolerating diet well, denies nausea/vomiting.

## 2020-05-06 VITALS
BODY MASS INDEX: 30.65 KG/M2 | HEIGHT: 71 IN | RESPIRATION RATE: 16 BRPM | OXYGEN SATURATION: 96 % | TEMPERATURE: 98 F | SYSTOLIC BLOOD PRESSURE: 128 MMHG | WEIGHT: 218.92 LBS | HEART RATE: 63 BPM | DIASTOLIC BLOOD PRESSURE: 62 MMHG

## 2020-05-06 LAB
ANION GAP SERPL CALCULATED.3IONS-SCNC: 11 MMOL/L (ref 3–16)
BUN BLDV-MCNC: 32 MG/DL (ref 7–20)
CALCIUM SERPL-MCNC: 8.2 MG/DL (ref 8.3–10.6)
CHLORIDE BLD-SCNC: 102 MMOL/L (ref 99–110)
CO2: 25 MMOL/L (ref 21–32)
CREAT SERPL-MCNC: 1.4 MG/DL (ref 0.8–1.3)
GFR AFRICAN AMERICAN: 59
GFR NON-AFRICAN AMERICAN: 49
GLUCOSE BLD-MCNC: 100 MG/DL (ref 70–99)
HCT VFR BLD CALC: 31.8 % (ref 40.5–52.5)
HCT VFR BLD CALC: 34.5 % (ref 40.5–52.5)
HEMOGLOBIN: 10.3 G/DL (ref 13.5–17.5)
HEMOGLOBIN: 11.2 G/DL (ref 13.5–17.5)
MCH RBC QN AUTO: 30.5 PG (ref 26–34)
MCH RBC QN AUTO: 30.7 PG (ref 26–34)
MCHC RBC AUTO-ENTMCNC: 32.4 G/DL (ref 31–36)
MCHC RBC AUTO-ENTMCNC: 32.4 G/DL (ref 31–36)
MCV RBC AUTO: 94.1 FL (ref 80–100)
MCV RBC AUTO: 94.8 FL (ref 80–100)
PDW BLD-RTO: 16.3 % (ref 12.4–15.4)
PDW BLD-RTO: 16.6 % (ref 12.4–15.4)
PLATELET # BLD: 143 K/UL (ref 135–450)
PLATELET # BLD: 170 K/UL (ref 135–450)
PMV BLD AUTO: 9.3 FL (ref 5–10.5)
PMV BLD AUTO: 9.6 FL (ref 5–10.5)
POTASSIUM SERPL-SCNC: 4.7 MMOL/L (ref 3.5–5.1)
RBC # BLD: 3.38 M/UL (ref 4.2–5.9)
RBC # BLD: 3.64 M/UL (ref 4.2–5.9)
SODIUM BLD-SCNC: 138 MMOL/L (ref 136–145)
WBC # BLD: 6.4 K/UL (ref 4–11)
WBC # BLD: 6.7 K/UL (ref 4–11)

## 2020-05-06 PROCEDURE — 80048 BASIC METABOLIC PNL TOTAL CA: CPT

## 2020-05-06 PROCEDURE — 6370000000 HC RX 637 (ALT 250 FOR IP): Performed by: INTERNAL MEDICINE

## 2020-05-06 PROCEDURE — 2580000003 HC RX 258: Performed by: INTERNAL MEDICINE

## 2020-05-06 PROCEDURE — 6360000002 HC RX W HCPCS: Performed by: INTERNAL MEDICINE

## 2020-05-06 PROCEDURE — 97530 THERAPEUTIC ACTIVITIES: CPT

## 2020-05-06 PROCEDURE — 85027 COMPLETE CBC AUTOMATED: CPT

## 2020-05-06 PROCEDURE — 97535 SELF CARE MNGMENT TRAINING: CPT

## 2020-05-06 PROCEDURE — 36415 COLL VENOUS BLD VENIPUNCTURE: CPT

## 2020-05-06 RX ORDER — PANTOPRAZOLE SODIUM 40 MG/1
40 TABLET, DELAYED RELEASE ORAL
Qty: 30 TABLET | Refills: 3 | Status: SHIPPED | OUTPATIENT
Start: 2020-05-06

## 2020-05-06 RX ORDER — ASPIRIN 81 MG/1
81 TABLET, CHEWABLE ORAL DAILY
Qty: 30 TABLET | Refills: 3 | Status: SHIPPED | OUTPATIENT
Start: 2020-05-07 | End: 2022-09-06

## 2020-05-06 RX ORDER — AMOXICILLIN AND CLAVULANATE POTASSIUM 875; 125 MG/1; MG/1
1 TABLET, FILM COATED ORAL 2 TIMES DAILY
Qty: 14 TABLET | Refills: 0 | Status: SHIPPED | OUTPATIENT
Start: 2020-05-06 | End: 2020-05-13

## 2020-05-06 RX ADMIN — PIPERACILLIN AND TAZOBACTAM 3.38 G: 3; .375 INJECTION, POWDER, LYOPHILIZED, FOR SOLUTION INTRAVENOUS at 10:59

## 2020-05-06 RX ADMIN — ATORVASTATIN CALCIUM 80 MG: 80 TABLET, FILM COATED ORAL at 08:36

## 2020-05-06 RX ADMIN — PANTOPRAZOLE SODIUM 40 MG: 40 TABLET, DELAYED RELEASE ORAL at 06:34

## 2020-05-06 RX ADMIN — ASPIRIN 81 MG 81 MG: 81 TABLET ORAL at 08:36

## 2020-05-06 RX ADMIN — PIPERACILLIN AND TAZOBACTAM 3.38 G: 3; .375 INJECTION, POWDER, LYOPHILIZED, FOR SOLUTION INTRAVENOUS at 01:59

## 2020-05-06 RX ADMIN — FUROSEMIDE 20 MG: 10 INJECTION, SOLUTION INTRAMUSCULAR; INTRAVENOUS at 08:37

## 2020-05-06 RX ADMIN — Medication 1 CAPSULE: at 08:36

## 2020-05-06 RX ADMIN — Medication 10 ML: at 08:38

## 2020-05-06 RX ADMIN — Medication 10 ML: at 01:59

## 2020-05-06 RX ADMIN — ENOXAPARIN SODIUM 40 MG: 40 INJECTION SUBCUTANEOUS at 08:36

## 2020-05-06 RX ADMIN — LEVOTHYROXINE SODIUM 50 MCG: 50 TABLET ORAL at 06:34

## 2020-05-06 ASSESSMENT — PAIN SCALES - GENERAL: PAINLEVEL_OUTOF10: 0

## 2020-05-06 NOTE — PROGRESS NOTES
seated on toilet, expressing he has no urge to go to the BR for BM (but then had BM while seated); anticipate pt will require assist w/ incontinence hygiene - states his aides can help him (although aides are only there 1 hr/day 3 days/week)           Balance  Sitting Balance: Supervision(on seat of Sanjay Hearing (at sink level during grooming tasks))  Standing Balance: Minimal assistance(x 2.5 minutes - static standing at sink level w/ UE support of [employer] Hearing bar (forward flexed trunk, knees flexed - knees do not straighten at baseline); cues for upright - as pt's head nearly hitting the sink faucet )    Functional Mobility  Functional Mobility Comments: Note: non-ambulatory at baseline    Toilet Transfers  Toilet - Technique: (via [employer] Hearing)  Equipment Used: Standard toilet  Toilet Transfer: Minimal assistance    Bed mobility  Supine to Sit: Stand by assistance(HOB elevated, use of rail)  Scooting: Stand by assistance(to EOB)  Comment: pt sleeps in recliner at home; does not perform supine to sit     Transfers  Stand Pivot Transfers: Minimal assistance(bed > chair (chair angled to R, pt pivots to L - LEs tangle beneath him); pt not receptive to suggestions to transfer any other way)  Sit to stand: Minimal assistance(from chair/toilet to [employer])  Stand to sit: Minimal assistance                          Cognition  Cognition Comment: decreaesd insight                                         Plan   Plan  Times per week: 2-5x  Times per day: Daily  Current Treatment Recommendations: Balance Training, Functional Mobility Training, Endurance Training, Self-Care / ADL, Patient/Caregiver Education & Training, Safety Education & Training                                                      AM-PAC Score        AM-[facility] Inpatient Daily Activity Raw Score: 15 (05/06/20 1104)  AM-PAC Inpatient ADL T-Scale Score : 34.69 (05/06/20 1104)  ADL Inpatient CMS 0-100% Score: 56.46 (05/06/20 1104)  ADL Inpatient CMS G-Code Modifier :

## 2020-05-06 NOTE — PROGRESS NOTES
Patient discharge education complete. IV removed and dressing placed. Patient verbalized understanding of medications and side effects at time of discharge. All questions answered at this time. Patient waiting for ride to be discharged.   Electronically signed by Araceli Cormier RN on 5/6/2020 at 4:00 PM

## 2020-05-06 NOTE — PROGRESS NOTES
Patient alert and oriented x4. VSS and patient remained afebrile throughout shift. Pt. Denies any pain. Skin remains red and blanchable. Zinc cream applied to coccyx and buttocks. Iglesias care done. Patient has scant amount of urethral drainage. Urine output is adequate and urine is clear with no sediment present. Patient continues to receive IV ABX with no issues. Patient denies any other needs at this time. Bed is in the lowest position, call light and bedside table within reach. Patient bed alarm is on. Will continue to monitor for changes in patient status.      Electronically signed by Prasanna Marsh RN on 5/6/2020 at 2:25 PM

## 2020-05-07 ENCOUNTER — CARE COORDINATION (OUTPATIENT)
Dept: CASE MANAGEMENT | Age: 80
End: 2020-05-07

## 2020-05-07 NOTE — CARE COORDINATION
[facility] Transitions Initial Follow Up Call    Call within 2 business days of discharge: Yes    Patient: Peggy Khan Patient : 1940   MRN: [id number]   Reason for Admission: COVID 19 MONITORING   Discharge Date: 20 RARS: Readmission Risk Score: 42      Last Discharge [address] [address]       Complaint Diagnosis Description Type Department Provider    20 Other Cellulitis of scrotum . .. ED to Hosp-Admission (Discharged) (ADMITTED) Yoly Aggarwal MD; Cam Armendariz. .. Spoke with: [address] [place] [address]: [facility]      SUMMARY  CTC spoke to the Pt briefly who states he is \"doing ok\", declined additional calls, and disconnected the line. Follow Up  No future appointments.     Kira Sánchez RN

## 2020-05-11 ENCOUNTER — HOSPITAL ENCOUNTER (OUTPATIENT)
Age: 80
Setting detail: SPECIMEN
Discharge: HOME | End: 2020-05-11
Payer: MEDICARE

## 2020-05-11 LAB
ANION GAP SERPL CALCULATED.3IONS-SCNC: 8 MMOL/L (ref 3–16)
BASOPHILS ABSOLUTE: 0.1 K/UL (ref 0–0.2)
BASOPHILS RELATIVE PERCENT: 1.5 %
BUN BLDV-MCNC: 31 MG/DL (ref 7–20)
CALCIUM SERPL-MCNC: 9.1 MG/DL (ref 8.3–10.6)
CHLORIDE BLD-SCNC: 105 MMOL/L (ref 99–110)
CO2: 26 MMOL/L (ref 21–32)
CREAT SERPL-MCNC: 0.9 MG/DL (ref 0.8–1.3)
EOSINOPHILS ABSOLUTE: 0.6 K/UL (ref 0–0.6)
EOSINOPHILS RELATIVE PERCENT: 9.7 %
GFR AFRICAN AMERICAN: >60
GFR NON-AFRICAN AMERICAN: >60
GLUCOSE BLD-MCNC: 115 MG/DL (ref 70–99)
HCT VFR BLD CALC: 38.9 % (ref 40.5–52.5)
HEMOGLOBIN: 12.6 G/DL (ref 13.5–17.5)
LYMPHOCYTES ABSOLUTE: 1.1 K/UL (ref 1–5.1)
LYMPHOCYTES RELATIVE PERCENT: 19.7 %
MCH RBC QN AUTO: 30.2 PG (ref 26–34)
MCHC RBC AUTO-ENTMCNC: 32.3 G/DL (ref 31–36)
MCV RBC AUTO: 93.6 FL (ref 80–100)
MONOCYTES ABSOLUTE: 0.5 K/UL (ref 0–1.3)
MONOCYTES RELATIVE PERCENT: 8.4 %
NEUTROPHILS ABSOLUTE: 3.5 K/UL (ref 1.7–7.7)
NEUTROPHILS RELATIVE PERCENT: 60.7 %
PDW BLD-RTO: 16.5 % (ref 12.4–15.4)
PLATELET # BLD: 216 K/UL (ref 135–450)
PMV BLD AUTO: 9.9 FL (ref 5–10.5)
POTASSIUM SERPL-SCNC: 5.4 MMOL/L (ref 3.5–5.1)
RBC # BLD: 4.16 M/UL (ref 4.2–5.9)
SODIUM BLD-SCNC: 139 MMOL/L (ref 136–145)
WBC # BLD: 5.8 K/UL (ref 4–11)

## 2020-05-11 PROCEDURE — 80048 BASIC METABOLIC PNL TOTAL CA: CPT

## 2020-05-11 PROCEDURE — 36415 COLL VENOUS BLD VENIPUNCTURE: CPT

## 2020-05-11 PROCEDURE — 85025 COMPLETE CBC W/AUTO DIFF WBC: CPT

## 2020-06-09 ENCOUNTER — HOSPITAL ENCOUNTER (INPATIENT)
Age: 80
LOS: 2 days | Discharge: HOME | DRG: 392 | End: 2020-06-11
Attending: EMERGENCY MEDICINE | Admitting: INTERNAL MEDICINE
Payer: MEDICARE

## 2020-06-09 ENCOUNTER — APPOINTMENT (OUTPATIENT)
Dept: CT IMAGING | Age: 80
DRG: 392 | End: 2020-06-09
Payer: MEDICARE

## 2020-06-09 PROBLEM — R15.9 ENCOPRESIS WITH CONSTIPATION AND OVERFLOW INCONTINENCE: Status: ACTIVE | Noted: 2020-06-09

## 2020-06-09 PROBLEM — K59.00 CONSTIPATION: Status: ACTIVE | Noted: 2020-06-09

## 2020-06-09 LAB
ANION GAP SERPL CALCULATED.3IONS-SCNC: 13 MMOL/L (ref 3–16)
BASE EXCESS VENOUS: 1.9 MMOL/L (ref -2–3)
BASOPHILS ABSOLUTE: 0.1 K/UL (ref 0–0.2)
BASOPHILS RELATIVE PERCENT: 0.8 %
BUN BLDV-MCNC: 23 MG/DL (ref 7–20)
C DIFF TOXIN/ANTIGEN: NORMAL
CALCIUM SERPL-MCNC: 8.9 MG/DL (ref 8.3–10.6)
CARBOXYHEMOGLOBIN: 1.9 % (ref 0–1.5)
CHLORIDE BLD-SCNC: 104 MMOL/L (ref 99–110)
CO2: 25 MMOL/L (ref 21–32)
CREAT SERPL-MCNC: 1.1 MG/DL (ref 0.8–1.3)
EOSINOPHILS ABSOLUTE: 0.5 K/UL (ref 0–0.6)
EOSINOPHILS RELATIVE PERCENT: 6.6 %
FERRITIN: 79.2 NG/ML (ref 30–400)
GFR AFRICAN AMERICAN: >60
GFR NON-AFRICAN AMERICAN: >60
GLUCOSE BLD-MCNC: 97 MG/DL (ref 70–99)
HCO3 VENOUS: 27 MMOL/L (ref 24–28)
HCT VFR BLD CALC: 36.8 % (ref 40.5–52.5)
HEMOGLOBIN, VEN, REDUCED: 29.1 %
HEMOGLOBIN: 12.3 G/DL (ref 13.5–17.5)
LACTIC ACID: 0.8 MMOL/L (ref 0.4–2)
LYMPHOCYTES ABSOLUTE: 1.2 K/UL (ref 1–5.1)
LYMPHOCYTES RELATIVE PERCENT: 15.1 %
MAGNESIUM: 2.1 MG/DL (ref 1.8–2.4)
MCH RBC QN AUTO: 31.2 PG (ref 26–34)
MCHC RBC AUTO-ENTMCNC: 33.3 G/DL (ref 31–36)
MCV RBC AUTO: 93.6 FL (ref 80–100)
METHEMOGLOBIN VENOUS: 0.5 % (ref 0–1.5)
MONOCYTES ABSOLUTE: 0.5 K/UL (ref 0–1.3)
MONOCYTES RELATIVE PERCENT: 7.1 %
NEUTROPHILS ABSOLUTE: 5.3 K/UL (ref 1.7–7.7)
NEUTROPHILS RELATIVE PERCENT: 70.4 %
O2 SAT, VEN: 70 %
OCCULT BLOOD DIAGNOSTIC: NORMAL
PCO2, VEN: 46.7 MMHG (ref 41–51)
PDW BLD-RTO: 17 % (ref 12.4–15.4)
PH VENOUS: 7.38 (ref 7.35–7.45)
PLATELET # BLD: 136 K/UL (ref 135–450)
PMV BLD AUTO: 10.1 FL (ref 5–10.5)
PO2, VEN: 41 MMHG (ref 25–40)
POTASSIUM REFLEX MAGNESIUM: 3.7 MMOL/L (ref 3.5–5.1)
RBC # BLD: 3.93 M/UL (ref 4.2–5.9)
SODIUM BLD-SCNC: 142 MMOL/L (ref 136–145)
TCO2 CALC VENOUS: 28 MMOL/L
WBC # BLD: 7.6 K/UL (ref 4–11)

## 2020-06-09 PROCEDURE — 84439 ASSAY OF FREE THYROXINE: CPT

## 2020-06-09 PROCEDURE — 6360000002 HC RX W HCPCS: Performed by: STUDENT IN AN ORGANIZED HEALTH CARE EDUCATION/TRAINING PROGRAM

## 2020-06-09 PROCEDURE — 84466 ASSAY OF TRANSFERRIN: CPT

## 2020-06-09 PROCEDURE — 87493 C DIFF AMPLIFIED PROBE: CPT

## 2020-06-09 PROCEDURE — 6370000000 HC RX 637 (ALT 250 FOR IP): Performed by: STUDENT IN AN ORGANIZED HEALTH CARE EDUCATION/TRAINING PROGRAM

## 2020-06-09 PROCEDURE — 82803 BLOOD GASES ANY COMBINATION: CPT

## 2020-06-09 PROCEDURE — 36415 COLL VENOUS BLD VENIPUNCTURE: CPT

## 2020-06-09 PROCEDURE — 87324 CLOSTRIDIUM AG IA: CPT

## 2020-06-09 PROCEDURE — 74177 CT ABD & PELVIS W/CONTRAST: CPT

## 2020-06-09 PROCEDURE — 83735 ASSAY OF MAGNESIUM: CPT

## 2020-06-09 PROCEDURE — 85025 COMPLETE CBC W/AUTO DIFF WBC: CPT

## 2020-06-09 PROCEDURE — 87449 NOS EACH ORGANISM AG IA: CPT

## 2020-06-09 PROCEDURE — G0328 FECAL BLOOD SCRN IMMUNOASSAY: HCPCS

## 2020-06-09 PROCEDURE — 84443 ASSAY THYROID STIM HORMONE: CPT

## 2020-06-09 PROCEDURE — 99285 EMERGENCY DEPT VISIT HI MDM: CPT

## 2020-06-09 PROCEDURE — 83540 ASSAY OF IRON: CPT

## 2020-06-09 PROCEDURE — 1200000000 HC SEMI PRIVATE

## 2020-06-09 PROCEDURE — 96372 THER/PROPH/DIAG INJ SC/IM: CPT

## 2020-06-09 PROCEDURE — 2580000003 HC RX 258: Performed by: STUDENT IN AN ORGANIZED HEALTH CARE EDUCATION/TRAINING PROGRAM

## 2020-06-09 PROCEDURE — 83605 ASSAY OF LACTIC ACID: CPT

## 2020-06-09 PROCEDURE — 80048 BASIC METABOLIC PNL TOTAL CA: CPT

## 2020-06-09 PROCEDURE — 82728 ASSAY OF FERRITIN: CPT

## 2020-06-09 PROCEDURE — 6360000004 HC RX CONTRAST MEDICATION: Performed by: EMERGENCY MEDICINE

## 2020-06-09 RX ORDER — ACETAMINOPHEN 650 MG/1
650 SUPPOSITORY RECTAL EVERY 6 HOURS PRN
Status: DISCONTINUED | OUTPATIENT
Start: 2020-06-09 | End: 2020-06-12 | Stop reason: HOSPADM

## 2020-06-09 RX ORDER — TAMSULOSIN HYDROCHLORIDE 0.4 MG/1
0.4 CAPSULE ORAL DAILY
Status: DISCONTINUED | OUTPATIENT
Start: 2020-06-10 | End: 2020-06-12 | Stop reason: HOSPADM

## 2020-06-09 RX ORDER — SODIUM CHLORIDE 0.9 % (FLUSH) 0.9 %
10 SYRINGE (ML) INJECTION EVERY 12 HOURS SCHEDULED
Status: DISCONTINUED | OUTPATIENT
Start: 2020-06-09 | End: 2020-06-12 | Stop reason: HOSPADM

## 2020-06-09 RX ORDER — METOPROLOL SUCCINATE 25 MG/1
25 TABLET, EXTENDED RELEASE ORAL DAILY
Status: ON HOLD | COMMUNITY
End: 2020-08-18

## 2020-06-09 RX ORDER — NIACIN 500 MG/1
500 TABLET, EXTENDED RELEASE ORAL NIGHTLY PRN
Status: DISCONTINUED | OUTPATIENT
Start: 2020-06-09 | End: 2020-06-10

## 2020-06-09 RX ORDER — SODIUM CHLORIDE 0.9 % (FLUSH) 0.9 %
10 SYRINGE (ML) INJECTION PRN
Status: DISCONTINUED | OUTPATIENT
Start: 2020-06-09 | End: 2020-06-12 | Stop reason: HOSPADM

## 2020-06-09 RX ORDER — ATORVASTATIN CALCIUM 80 MG/1
80 TABLET, FILM COATED ORAL NIGHTLY
Status: DISCONTINUED | OUTPATIENT
Start: 2020-06-09 | End: 2020-06-12 | Stop reason: HOSPADM

## 2020-06-09 RX ORDER — SENNA AND DOCUSATE SODIUM 50; 8.6 MG/1; MG/1
2 TABLET, FILM COATED ORAL 2 TIMES DAILY
Status: DISCONTINUED | OUTPATIENT
Start: 2020-06-09 | End: 2020-06-12 | Stop reason: HOSPADM

## 2020-06-09 RX ORDER — METOPROLOL SUCCINATE 50 MG/1
25 TABLET, EXTENDED RELEASE ORAL DAILY
Status: DISCONTINUED | OUTPATIENT
Start: 2020-06-10 | End: 2020-06-12 | Stop reason: HOSPADM

## 2020-06-09 RX ORDER — PROMETHAZINE HYDROCHLORIDE 25 MG/1
12.5 TABLET ORAL EVERY 6 HOURS PRN
Status: DISCONTINUED | OUTPATIENT
Start: 2020-06-09 | End: 2020-06-12 | Stop reason: HOSPADM

## 2020-06-09 RX ORDER — ACETAMINOPHEN 325 MG/1
650 TABLET ORAL EVERY 6 HOURS PRN
Status: DISCONTINUED | OUTPATIENT
Start: 2020-06-09 | End: 2020-06-12 | Stop reason: HOSPADM

## 2020-06-09 RX ORDER — CASTOR OIL AND BALSAM, PERU 788; 87 MG/G; MG/G
OINTMENT TOPICAL 2 TIMES DAILY
Status: DISCONTINUED | OUTPATIENT
Start: 2020-06-09 | End: 2020-06-12 | Stop reason: HOSPADM

## 2020-06-09 RX ORDER — FUROSEMIDE 20 MG/1
20 TABLET ORAL DAILY
Status: DISCONTINUED | OUTPATIENT
Start: 2020-06-10 | End: 2020-06-12 | Stop reason: HOSPADM

## 2020-06-09 RX ORDER — LEVOTHYROXINE SODIUM 0.05 MG/1
50 TABLET ORAL DAILY
Status: DISCONTINUED | OUTPATIENT
Start: 2020-06-10 | End: 2020-06-12 | Stop reason: HOSPADM

## 2020-06-09 RX ORDER — PANTOPRAZOLE SODIUM 40 MG/1
40 TABLET, DELAYED RELEASE ORAL
Status: DISCONTINUED | OUTPATIENT
Start: 2020-06-10 | End: 2020-06-12 | Stop reason: HOSPADM

## 2020-06-09 RX ORDER — UREA 10 %
10 LOTION (ML) TOPICAL NIGHTLY PRN
Status: DISCONTINUED | OUTPATIENT
Start: 2020-06-09 | End: 2020-06-12 | Stop reason: HOSPADM

## 2020-06-09 RX ORDER — ONDANSETRON 2 MG/ML
4 INJECTION INTRAMUSCULAR; INTRAVENOUS EVERY 6 HOURS PRN
Status: DISCONTINUED | OUTPATIENT
Start: 2020-06-09 | End: 2020-06-12 | Stop reason: HOSPADM

## 2020-06-09 RX ADMIN — LACTULOSE: 10 SOLUTION ORAL at 23:23

## 2020-06-09 RX ADMIN — IOPAMIDOL 80 ML: 755 INJECTION, SOLUTION INTRAVENOUS at 15:31

## 2020-06-09 RX ADMIN — CASTOR OIL AND BALSAM, PERU: 788; 87 OINTMENT TOPICAL at 23:23

## 2020-06-09 RX ADMIN — ATORVASTATIN CALCIUM 80 MG: 80 TABLET, FILM COATED ORAL at 23:22

## 2020-06-09 RX ADMIN — SENNOSIDES AND DOCUSATE SODIUM 2 TABLET: 8.6; 5 TABLET ORAL at 23:22

## 2020-06-09 RX ADMIN — ENOXAPARIN SODIUM 40 MG: 40 INJECTION SUBCUTANEOUS at 18:55

## 2020-06-09 RX ADMIN — POLYETHYLENE GLYCOL 3350, SODIUM SULFATE ANHYDROUS, SODIUM BICARBONATE, SODIUM CHLORIDE, POTASSIUM CHLORIDE 2000 ML: 236; 22.74; 6.74; 5.86; 2.97 POWDER, FOR SOLUTION ORAL at 23:57

## 2020-06-09 ASSESSMENT — ENCOUNTER SYMPTOMS
VOMITING: 0
EYE PAIN: 0
CHOKING: 0
NAUSEA: 0
ABDOMINAL PAIN: 0
SHORTNESS OF BREATH: 0
EYE DISCHARGE: 0
RHINORRHEA: 0
CHEST TIGHTNESS: 0
FACIAL SWELLING: 0
DIARRHEA: 1

## 2020-06-09 ASSESSMENT — PAIN DESCRIPTION - DESCRIPTORS: DESCRIPTORS: ACHING

## 2020-06-09 ASSESSMENT — PAIN DESCRIPTION - ORIENTATION: ORIENTATION: LOWER

## 2020-06-09 ASSESSMENT — PAIN DESCRIPTION - LOCATION: LOCATION: BACK

## 2020-06-09 ASSESSMENT — PAIN SCALES - GENERAL
PAINLEVEL_OUTOF10: 3
PAINLEVEL_OUTOF10: 0

## 2020-06-09 ASSESSMENT — VISUAL ACUITY: OU: 1

## 2020-06-09 ASSESSMENT — PAIN DESCRIPTION - PAIN TYPE: TYPE: ACUTE PAIN

## 2020-06-09 NOTE — PROGRESS NOTES
Four eyes completed, LDA added. Specialty mattress ordered. Wound care consult added.    Electronically signed by Allison Buchanan on 6/9/2020 at 7:36 PM

## 2020-06-09 NOTE — ED NOTES
Clinical Pharmacy Progress Note  Medication History     Admit Date: 6/9/2020    Subjective/Objective:  68 yo M admitted with diarrhea and weakness. List of current medications patient is taking is complete. Home medication list in EPIC updated to reflect changes noted below.     Source of information: AVS from admission 5/2-5/6, outpatient fill records    Changes made to medication list:   Medications added:   · Metoprolol Succinate ER 25mg daily       Hayden Rodgers PharmD  PGY1 Pharmacy Resident  Wireless: [phone] ([id number])  6/9/2020 4:45 PM

## 2020-06-09 NOTE — ED NOTES
Genesis Licea RN and this RN cleaned up the patient following a CT tech having concerns that he had stool on his back. When changing the patient, the patient complained of some soreness on his buttocks. Patient had dried dark brown/black loose stool all over his perineal area, even up to his mid-back. Patient has skin breakdown and blisters over most of his buttocks and into his genital area. Patient states that he has been using [employer] on those areas but with little relief. Patient states that his aide comes around at least every day but that he was on his own this morning so he attempted to clean himself up. Patient was cleaned up with multiple warm wipes and washcloths, perineal care was performed, barrier cream and spray cleanser lotion was applied to all skin breakdown areas, and the patient was propped up onto his side using pillows to take pressure off of those areas. Social Work was consulted for possibly needing extra care than he is getting currently at home upon discharge.       Xochitl Tabares RN  06/09/20 301 Nathanael Dennis RN  06/09/20 [id number]

## 2020-06-09 NOTE — H&P
JESU 21-59% stenosis; LICA 22-19% stenosis    Cellulitis 12/2013    LLE    Chronic back pain     Diverticular disease     Erectile dysfunction     GERD (gastroesophageal reflux disease)     History of atrial fibrillation     Hypercholesteremia     Hypertension     Lower GI bleed     MDRO (multiple drug resistant organisms) resistance 10/26/2019    urine    Neuromuscular disorder (HCC)     spasticity    Renal insufficiency     Risk for falls     uses walker    Tinea corporis 12/2013    LLE    Vitamin B12 deficiency    ·     Past Surgical History:        Procedure Laterality Date    BACK SURGERY  2006    lower lumbar    CORONARY ARTERY BYPASS GRAFT  12/13/2013    CABG x 5 (Dr Priti Evans)   [address] N/A 1/10/2019    CYSTOSCOPY performed by Jignesh Tong MD at [facility] [address] Right 5/1/2015    [facility]    UPPER GASTROINTESTINAL ENDOSCOPY N/A 5/4/2020    EGD BIOPSY performed by Elayne George MD at [facility]   ·     Medications Priorto Admission:    · Not in a hospital admission. Allergies:  Bactrim [sulfamethoxazole-trimethoprim]    Social History:   · TOBACCO:   reports that he quit smoking about 50 years ago. He has never used smokeless tobacco.  · ETOH:   reports no history of alcohol use. · DRUGS : Denies  · Patient currently lives at home w/ home health  ·   Family History:       Problem Relation Age of Onset    Heart Disease Father    ·     Review of Systems    ROS: A 10 point review of systems was conducted, significant findings as noted in HPI. Physical Exam  Vitals signs and nursing note reviewed. Constitutional:       General: He is not in acute distress. Appearance: Normal appearance. He is not ill-appearing. HENT:      Head: Normocephalic and atraumatic. Mouth/Throat:      Mouth: Mucous membranes are moist.   Eyes:      General: Vision grossly intact.    Neck:      Musculoskeletal: Full passive range of motion without pain, normal range of motion and neck supple. Cardiovascular:      Rate and Rhythm: Normal rate and regular rhythm. Pulses: Normal pulses. Heart sounds: Normal heart sounds, S1 normal and S2 normal. No murmur. No friction rub. No gallop. Pulmonary:      Effort: Pulmonary effort is normal. No respiratory distress. Breath sounds: Normal breath sounds and air entry. No wheezing or rales. Abdominal:      General: Bowel sounds are normal. There is no distension. Palpations: Abdomen is soft. There is mass (In RLQ pt stated is baclofen pump). Musculoskeletal: Normal range of motion. Right lower leg: Edema present. Left lower leg: Edema present. Comments: LE currently ACE wrapped. 2+ edema above ace wrap. 1+ underneath wrap   Skin:     Capillary Refill: Capillary refill takes less than 2 seconds. Coloration: Skin is not pale. Neurological:      Mental Status: He is alert and oriented to person, place, and time. Mental status is at baseline. Motor: Weakness (Worsening generalized weakness on chronic B/l LE weakness ) present. Psychiatric:         Mood and Affect: Mood normal.         Speech: Speech normal.         Judgment: Judgment normal.       Physical exam:       Vitals:    06/09/20 1355   BP: 113/69   Pulse: 64   Resp: 14   Temp: 97.7 °F (36.5 °C)   SpO2: 95%       DATA:    Labs:  CBC:   Recent Labs     06/09/20  1431   WBC 7.6   HGB 12.3*   HCT 36.8*          BMP:   Recent Labs     06/09/20  1431      K 3.7      CO2 25   BUN 23*   CREATININE 1.1   GLUCOSE 97     LFT's: No results for input(s): AST, ALT, ALB, BILITOT, ALKPHOS in the last 72 hours. Troponin: No results for input(s): TROPONINI in the last 72 hours. BNP:No results for input(s): BNP in the last 72 hours. ABGs: No results for input(s): PHART, YCU3CZG, PO2ART in the last 72 hours. INR: No results for input(s): INR in the last 72 hours.     U/A:No results for input(s): NITRITE, COLORU, PHUR, LABCAST, WBCUA, RBCUA, MUCUS, TRICHOMONAS, YEAST, BACTERIA, CLARITYU, SPECGRAV, LEUKOCYTESUR, UROBILINOGEN, BILIRUBINUR, BLOODU, GLUCOSEU, AMORPHOUS in the last 72 hours. Invalid input(s): [facility]    CT ABDOMEN PELVIS W IV CONTRAST Additional Contrast? None   Final Result   1. Chronic bladder wall thickening, unchanged from prior studies. 2. Large volume fecal material within the rectum. Associated rectal wall thickening and perirectal inflammatory change. This is also similar to prior studies. Correlate clinically for impaction. 3. Loculated left effusion or empyema with adjacent atelectasis. This is unchanged. ASSESSMENT AND PLAN:  Tiff Weber is a 67 y. o. male who presents with diarrhea x3wks and is being admitted for management of the following     Chronic diarrhea  Likely overflow incontinence. Pt states that he's had dark stool diarrhea x3wkx before which he had severe constipation. CT shows Large volume fecal material within the rectum. Associated rectal wall thickening and perirectal inflammatory change. Similar situation during last admission, requiring GenSurg consult for disempaction  - Hold home iron since this may be contributing to his constipation  - C.diff intermediate - reflex to PCR  - Lactulose enema once followed by half dose Golytely tonight  - Senokot- S BID     Severe Constipation  Same occurrence during previous admission w/ similar abd CT findings. Likely 2/2 to immobility w/ po iron probably contributing.  - Treatment as above  - Daily bowel regimen on discharge. - Update TSH    Chronic normocytic anemia  May be 2/2 to known gastritis. Last EGD(5/4/20):Gastric polyp, Hiatal hernia with mild gastritis without bleeding. Hgb stable since then. Stools dark, likely 2/2 to iron tablets since H/H stable but Upper GI bleed cannot be r/o.   - Check FOBT  - Start home ASA 81mg  - Update Iron studies  - Folate and B12     Chronic loculated left pleural effusion versus empyema   - Stable, chronic  - O/p f/u    HTN  BP WNL currently  - Start home Toprol and lasix     LE edema  Chronic. Recent dopplers from 3/20 showed no DVTs  - Cont compression stockings/ACE wraps     pAF  Was on full dose aspirin but was d/ce on ASA 81mg due to suspected GI bleed.  NSR on Tele in the ED.  - Cont home Metoprolol  - Cont baby ASA      Chronic urinary retention   - Cont Iglesias catheter for continuous bladder drainage  - O/p Urology f/u with plans for [facility]     Debility  Worsening weakness in the last 3wks.   - PT/OT consult  - CM consult for placement      Code Status: Full Code   FEN: Cardiac Diet  PPX: Lovenox  DISPO: [x] GMF    This patient has been staffed and discussed with Nishant Saunders MD.  ----------------------------  Scott Reynaga MD  6/9/2020,  5:42 PM

## 2020-06-09 NOTE — ED PROVIDER NOTES
wound. Allergic/Immunologic: Negative for environmental allergies. Neurological: Positive for weakness. Negative for dizziness, seizures, syncope and light-headedness. Hematological: Does not bruise/bleed easily. Psychiatric/Behavioral: Negative for confusion and hallucinations. Past Medical, Surgical, Family, and Social History     He has a past medical history of BPH (benign prostatic hyperplasia), Carotid stenosis, Cellulitis, Chronic back pain, Diverticular disease, Erectile dysfunction, GERD (gastroesophageal reflux disease), History of atrial fibrillation, Hypercholesteremia, Hypertension, Lower GI bleed, MDRO (multiple drug resistant organisms) resistance, Neuromuscular disorder ([facility] [address] Renal insufficiency, Risk for falls, Tinea corporis, and Vitamin B12 deficiency. He has a past surgical history that includes back surgery (2006); Foot surgery; Coronary artery bypass graft (12/13/2013); hip surgery (Right, 5/1/2015); Cystoscopy (N/A, 1/10/2019); and Upper gastrointestinal endoscopy (N/A, 5/4/2020). His family history includes Heart Disease in his father. He reports that he quit smoking about 50 years ago. He has never used smokeless tobacco. He reports that he does not drink alcohol or use drugs. Medications     Previous Medications    ASPIRIN 81 MG CHEWABLE TABLET    Take 1 tablet by mouth daily    ATORVASTATIN (LIPITOR) 80 MG TABLET    Take 80 mg by mouth nightly.     BALSAM PERU-CASTOR OIL (VENELEX) OINT OINTMENT    Apply topically 2 times daily    FERROUS SULFATE 325 (65 FE) MG TABLET    Take 325 mg by mouth daily (with breakfast)    FUROSEMIDE (LASIX) 20 MG TABLET    Take 1 tablet by mouth daily    LEVOTHYROXINE (SYNTHROID) 50 MCG TABLET    Take 50 mcg by mouth Daily    MELATONIN 10 MG TABS    Take 10 mg by mouth nightly as needed     NIACIN 500 MG TABLET    Take 1 tablet by mouth daily as needed (for spasticity)     PANTOPRAZOLE (PROTONIX) 40 MG TABLET    Take 1 tablet by mouth every morning (before breakfast)    TAMSULOSIN (FLOMAX) 0.4 MG CAPSULE    Take 0.4 mg by mouth daily    ZOLPIDEM (AMBIEN) 10 MG TABLET    Take 10 mg by mouth nightly as needed for Sleep. Allergies     He is allergic to bactrim [sulfamethoxazole-trimethoprim]. Physical Exam     INITIAL VITALS: BP: 113/69, Temp: 97.7 °F (36.5 °C), Pulse: 64, Resp: 14, SpO2: 95 %   Physical Exam  Constitutional:       General: He is not in acute distress. Appearance: He is well-developed. He is not diaphoretic. HENT:      Head: Normocephalic and atraumatic. Mouth/Throat:      Pharynx: No oropharyngeal exudate. Eyes:      General:         Right eye: No discharge. Left eye: No discharge. Conjunctiva/sclera: Conjunctivae normal.      Pupils: Pupils are equal, round, and reactive to light. Neck:      Musculoskeletal: Normal range of motion and neck supple. Thyroid: No thyromegaly. Vascular: No JVD. Trachea: No tracheal deviation. Cardiovascular:      Rate and Rhythm: Normal rate and regular rhythm. Heart sounds: Normal heart sounds. No murmur. No friction rub. No gallop. Pulmonary:      Effort: Pulmonary effort is normal. No respiratory distress. Breath sounds: Normal breath sounds. No stridor. No wheezing or rales. Chest:      Chest wall: No tenderness. Abdominal:      General: Bowel sounds are normal. There is no distension. Palpations: Abdomen is soft. Tenderness: There is abdominal tenderness (ttp left side of abdomen diffusely, non peritoneal). There is no guarding or rebound. Musculoskeletal:         General: No tenderness or deformity. Comments: Unable to lift lower extremities off bed - state they feel weak, no obvious deformities   Skin:     General: Skin is warm and dry. Capillary Refill: Capillary refill takes less than 2 seconds. Findings: No erythema or rash. Neurological:      General: No focal deficit present.       Mental Status: He is alert and oriented to person, place, and time. Cranial Nerves: No cranial nerve deficit. Motor: No abnormal muscle tone. Coordination: Coordination normal.   Psychiatric:         Behavior: Behavior normal.         Diagnostic Results     EKG   none    RADIOLOGY:  CT ABDOMEN PELVIS W IV CONTRAST Additional Contrast? None   Final Result   1. Chronic bladder wall thickening, unchanged from prior studies. 2. Large volume fecal material within the rectum. Associated rectal wall thickening and perirectal inflammatory change. This is also similar to prior studies. Correlate clinically for impaction. 3. Loculated left effusion or empyema with adjacent atelectasis. This is unchanged.              LABS:   Results for orders placed or performed during the hospital encounter of 06/09/20   CBC Auto Differential   Result Value Ref Range    WBC 7.6 4.0 - 11.0 K/uL    RBC 3.93 (L) 4.20 - 5.90 M/uL    Hemoglobin 12.3 (L) 13.5 - 17.5 g/dL    Hematocrit 36.8 (L) 40.5 - 52.5 %    MCV 93.6 80.0 - 100.0 fL    MCH 31.2 26.0 - 34.0 pg    MCHC 33.3 31.0 - 36.0 g/dL    RDW 17.0 (H) 12.4 - 15.4 %    Platelets 825 574 - 174 K/uL    MPV 10.1 5.0 - 10.5 fL    Neutrophils % 70.4 %    Lymphocytes % 15.1 %    Monocytes % 7.1 %    Eosinophils % 6.6 %    Basophils % 0.8 %    Neutrophils Absolute 5.3 1.7 - 7.7 K/uL    Lymphocytes Absolute 1.2 1.0 - 5.1 K/uL    Monocytes Absolute 0.5 0.0 - 1.3 K/uL    Eosinophils Absolute 0.5 0.0 - 0.6 K/uL    Basophils Absolute 0.1 0.0 - 0.2 K/uL   Basic Metabolic Panel w/ Reflex to MG   Result Value Ref Range    Sodium 142 136 - 145 mmol/L    Potassium reflex Magnesium 3.7 3.5 - 5.1 mmol/L    Chloride 104 99 - 110 mmol/L    CO2 25 21 - 32 mmol/L    Anion Gap 13 3 - 16    Glucose 97 70 - 99 mg/dL    BUN 23 (H) 7 - 20 mg/dL    CREATININE 1.1 0.8 - 1.3 mg/dL    GFR Non-African American >60 >60    GFR African American >60 >60    Calcium 8.9 8.3 - 10.6 mg/dL   Blood Gas, Venous   Result Value Ref Range    pH, Nick 7.380 7.350 - 7.450    pCO2, Nick 46.7 41.0 - 51.0 mmHg    pO2, Nick 41.0 (H) 25.0 - 40.0 mmHg    HCO3, Venous 27.0 24.0 - 28.0 mmol/L    Base Excess, Nick 1.9 -2.0 - 3.0 mmol/L    O2 Sat, Nick 70 Not established %    Carboxyhemoglobin 1.9 (H) 0.0 - 1.5 %    MetHgb, Nick 0.5 0.0 - 1.5 %    TC02 (Calc), Nick 28 mmol/L    Hemoglobin, Nick, Reduced 29.10 %   Lactic Acid, Plasma   Result Value Ref Range    Lactic Acid 0.8 0.4 - 2.0 mmol/L       ED BEDSIDE ULTRASOUND:  none    RECENT VITALS:  BP: 113/69,Temp: 97.7 °F (36.5 °C), Pulse: 64, Resp: 14, SpO2: 95 %     Procedures     none    ED Course     Nursing Notes, Past Medical Hx, Past Surgical Hx, Social Hx,Allergies, and Family Hx were reviewed. patient was given the following medications:  Orders Placed This Encounter   Medications    iopamidol (ISOVUE-370) 76 % injection 80 mL       CONSULTS:  IP CONSULT TO PRIMARY CARE PROVIDER  IP CONSULT TO HOSPITALIST    MEDICAL DECISIONMAKING / ASSESSMENT / Adele Subhash is a 67 y.o. male who presents with chief complaint of diarrhea, fatigue. Initial exam reveals a chronically ill-appearing male in no acute distress with normal vitals, afebrile. Mona Dorinda Physical exam with some left-sided abdominal tenderness to palpation. Unclear etiology of diarrhea, worked up as outpatient, C. difficile negative, stool studies negative. Patient had some mild left-sided abdominal pain, did order CT scan. This reveals large stool ball, questionably impacted. Unable to reach with my fingers. Patient likely has fecal incontinence diarrhea. Needs to be admitted for stool cleanout, possible GI evaluation. Possible also rehab placement because he is unable to take care of himself at this time. Clinical Impression     1. General weakness    2. Diarrhea, unspecified type        Disposition     PATIENT REFERRED TO:  No follow-up provider specified.     DISCHARGE MEDICATIONS:  New Prescriptions    No medications on file

## 2020-06-09 NOTE — CARE COORDINATION
Case Management Assessment           Initial Evaluation                Date / Time of Evaluation: 6/9/2020 5:10 PM                 Assessment Completed by: Governor Meneses    Patient Name: Alex Tolentino     YOB: 1940  Diagnosis: Constipation [K59.00]  Encopresis with constipation and overflow incontinence [R15.9]  Constipation [K59.00]  Encopresis with constipation and overflow incontinence [R15.9]     Date / Time: 6/9/2020  1:51 PM    Patient Admission Status: Inpatient    If patient is discharged prior to next notation, then this note serves as note for discharge by case management. Current PCP: [facility] Patient: No    Chart Reviewed: Yes  Patient/ Family Interviewed: Yes    Initial assessment completed at bedside with: Patient    Hospitalization in the last 30 days: No    Emergency Contacts:  Extended Emergency Contact Information  [facility] Phone: [phone]  Work Phone: [phone]  Mobile Phone: [phone]  Relation: Child  Secondary Emergency Contact: Dana Callahan  Address: [address]  Home Phone: [phone]  Relation: Other    Advance Directives:   Code Status: Prior    [address]: No    Financial  Payor: Andria Huntley / Plan: Mertha Schlein PLUS HMO / Product Type: *No Product type* /     Pre-cert required for SNF: Yes    [facility] - Mary Zheng [phone] - F [phone]  [address] [place] [address]  Phone: [phone] Fax: [phone]    [place] [id number] [facility] [address]  Phone: [phone] Fax: [phone]      Potential assistance Purchasing Medications:    Does Patient want to participate in local refill/ meds to beds program?:      Meds To Beds General Rules:  1.  Can ONLY be done Monday- Friday between Limited insight into deficits, Decreased endurance, Upper extremity weakness, Lower extremity weakness and Medical complications    Additional Case Management Notes: Referred to patient for d/c planning. Spoke to patient at bedside. Patient is a 67year old male admitted for weakness. Patient lives at home with daughter. Patient implies daughter is limited in her support. Patient is current with [facility] for RN. Patient is current with [facility] for a home health aide 5 days a week for 2 hours. Patient currently using a wheelchair for mobility. Patient reports he is usually able to do his transfers. Patient currently very malodorous. Per nursing patient with dried stool on presentation. Patient also with skin breakdown. Spoke to patient about possible SNF. Patient is agreeable if needed. Patient has been to [facility] in past and is agreeable to again. Referral made. If patient to go home requires APS referral due to poor condition and inability to care for self. Same concerns were noted on admission 3/17/2020. Call placed to APS, no active case at this time. Referral was made and case was not opened 3/10. The Plan for Transition of Care is related to the following treatment goals of Constipation [K59.00]  Encopresis with constipation and overflow incontinence [R15.9]  Constipation [K59.00]  Encopresis with constipation and overflow incontinence [R15.9]    The Patient and/or patient representative Aj Barney and his family were provided with a choice of provider and agrees with the discharge plan Not Indicated    Freedom of choice list was provided with basic dialogue that supports the patient's individualized plan of care/goals and shares the quality data associated with the providers.  Not Indicated    Care Transition patient: No    ARIC Smith, RADHAMES  [facility]  Case Management Department  Ph: [phone]

## 2020-06-09 NOTE — PROGRESS NOTES
4 Eyes Admission Assessment     I agree as the admission nurse that 2 RN's have performed a thorough Head to Toe Skin Assessment on the patient. ALL assessment sites listed below have been assessed on admission. Areas assessed by both nurses: Asia Padgett  [x]   Head, Face, and Ears   [x]   Shoulders, Back, and Chest  [x]   Arms, Elbows, and Hands   [x]   Coccyx, Sacrum, and Ischum  [x]   Legs, Feet, and Heels        Does the Patient have Skin Breakdown?   Yes a wound was noted on the Admission Assessment and an LDA was Initiated documentation include the Usha-wound, Wound Assessment, Measurements, Dressing Treatment, Drainage, and Color\",         Terrance Prevention initiated:  Yes   Wound Care Orders initiated:  Yes      WOC nurse consulted for Pressure Injury (Stage 3,4, Unstageable, DTI, NWPT, and Complex wounds):  Yes      Nurse 1 eSignature: Electronically signed by Avinash Dela Cruz on 6/9/20 at 7:02 PM EDT    **SHARE this note so that the co-signing nurse is able to place an eSignature**    Nurse 2 eSignature: Electronically signed by Chad Garcia RN on 6/9/20 at 7:20 PM EDT

## 2020-06-09 NOTE — ED NOTES
Patient had another semi-formed bowel movement. Patient's perineal area covered in dark brown stool. Patient's perineal area cleaned using wipes and warm, wet washcloths. Barrier cream applied. New brief put on the patient since he will be transported soon. Patient propped up on a pillow so that the pressure would be off of his buttocks. Report called to RN on [address]. Patient will be transported now by [facility], ED Tech.      Darrian Miranda RN  06/09/20 [address], RN  06/09/20 [id number]

## 2020-06-10 LAB
ANION GAP SERPL CALCULATED.3IONS-SCNC: 12 MMOL/L (ref 3–16)
BASOPHILS ABSOLUTE: 0 K/UL (ref 0–0.2)
BASOPHILS RELATIVE PERCENT: 0.6 %
BUN BLDV-MCNC: 20 MG/DL (ref 7–20)
C. DIFFICILE TOXIN MOLECULAR: NORMAL
CALCIUM SERPL-MCNC: 8.7 MG/DL (ref 8.3–10.6)
CHLORIDE BLD-SCNC: 103 MMOL/L (ref 99–110)
CO2: 28 MMOL/L (ref 21–32)
CREAT SERPL-MCNC: 1 MG/DL (ref 0.8–1.3)
EOSINOPHILS ABSOLUTE: 0.6 K/UL (ref 0–0.6)
EOSINOPHILS RELATIVE PERCENT: 8 %
FOLATE: >20 NG/ML (ref 4.78–24.2)
GFR AFRICAN AMERICAN: >60
GFR NON-AFRICAN AMERICAN: >60
GLUCOSE BLD-MCNC: 90 MG/DL (ref 70–99)
HCT VFR BLD CALC: 38.9 % (ref 40.5–52.5)
HEMOGLOBIN: 12.8 G/DL (ref 13.5–17.5)
IRON SATURATION: 19 % (ref 20–50)
IRON: 48 UG/DL (ref 59–158)
LYMPHOCYTES ABSOLUTE: 1.3 K/UL (ref 1–5.1)
LYMPHOCYTES RELATIVE PERCENT: 17.4 %
MAGNESIUM: 2 MG/DL (ref 1.8–2.4)
MCH RBC QN AUTO: 30.8 PG (ref 26–34)
MCHC RBC AUTO-ENTMCNC: 32.9 G/DL (ref 31–36)
MCV RBC AUTO: 93.7 FL (ref 80–100)
MONOCYTES ABSOLUTE: 0.6 K/UL (ref 0–1.3)
MONOCYTES RELATIVE PERCENT: 7.8 %
NEUTROPHILS ABSOLUTE: 5.1 K/UL (ref 1.7–7.7)
NEUTROPHILS RELATIVE PERCENT: 66.2 %
PDW BLD-RTO: 16.4 % (ref 12.4–15.4)
PLATELET # BLD: 139 K/UL (ref 135–450)
PMV BLD AUTO: 10.4 FL (ref 5–10.5)
POTASSIUM SERPL-SCNC: 4.2 MMOL/L (ref 3.5–5.1)
RBC # BLD: 4.15 M/UL (ref 4.2–5.9)
SARS-COV-2, NAAT: NOT DETECTED
SODIUM BLD-SCNC: 143 MMOL/L (ref 136–145)
T4 FREE: 1.2 NG/DL (ref 0.9–1.8)
TOTAL IRON BINDING CAPACITY: 250 UG/DL (ref 260–445)
TRANSFERRIN: 196 MG/DL (ref 200–360)
TSH REFLEX: 5.52 UIU/ML (ref 0.27–4.2)
VITAMIN B-12: 834 PG/ML (ref 211–911)
WBC # BLD: 7.7 K/UL (ref 4–11)

## 2020-06-10 PROCEDURE — 97530 THERAPEUTIC ACTIVITIES: CPT

## 2020-06-10 PROCEDURE — 83735 ASSAY OF MAGNESIUM: CPT

## 2020-06-10 PROCEDURE — 6370000000 HC RX 637 (ALT 250 FOR IP): Performed by: STUDENT IN AN ORGANIZED HEALTH CARE EDUCATION/TRAINING PROGRAM

## 2020-06-10 PROCEDURE — 85025 COMPLETE CBC W/AUTO DIFF WBC: CPT

## 2020-06-10 PROCEDURE — 97162 PT EVAL MOD COMPLEX 30 MIN: CPT

## 2020-06-10 PROCEDURE — 82607 VITAMIN B-12: CPT

## 2020-06-10 PROCEDURE — 1200000000 HC SEMI PRIVATE

## 2020-06-10 PROCEDURE — 82746 ASSAY OF FOLIC ACID SERUM: CPT

## 2020-06-10 PROCEDURE — 6360000002 HC RX W HCPCS: Performed by: STUDENT IN AN ORGANIZED HEALTH CARE EDUCATION/TRAINING PROGRAM

## 2020-06-10 PROCEDURE — 80048 BASIC METABOLIC PNL TOTAL CA: CPT

## 2020-06-10 PROCEDURE — 2580000003 HC RX 258: Performed by: STUDENT IN AN ORGANIZED HEALTH CARE EDUCATION/TRAINING PROGRAM

## 2020-06-10 PROCEDURE — 96372 THER/PROPH/DIAG INJ SC/IM: CPT

## 2020-06-10 PROCEDURE — 36415 COLL VENOUS BLD VENIPUNCTURE: CPT

## 2020-06-10 PROCEDURE — 97166 OT EVAL MOD COMPLEX 45 MIN: CPT

## 2020-06-10 RX ORDER — SODIUM CHLORIDE 9 MG/ML
INJECTION, SOLUTION INTRAVENOUS CONTINUOUS
Status: DISCONTINUED | OUTPATIENT
Start: 2020-06-10 | End: 2020-06-12 | Stop reason: HOSPADM

## 2020-06-10 RX ADMIN — ENOXAPARIN SODIUM 40 MG: 40 INJECTION SUBCUTANEOUS at 09:00

## 2020-06-10 RX ADMIN — Medication 10 ML: at 21:48

## 2020-06-10 RX ADMIN — CASTOR OIL AND BALSAM, PERU: 788; 87 OINTMENT TOPICAL at 21:48

## 2020-06-10 RX ADMIN — SENNOSIDES AND DOCUSATE SODIUM 2 TABLET: 8.6; 5 TABLET ORAL at 08:58

## 2020-06-10 RX ADMIN — ATORVASTATIN CALCIUM 80 MG: 80 TABLET, FILM COATED ORAL at 21:48

## 2020-06-10 RX ADMIN — TAMSULOSIN HYDROCHLORIDE 0.4 MG: 0.4 CAPSULE ORAL at 08:58

## 2020-06-10 RX ADMIN — POLYETHYLENE GLYCOL 3350, SODIUM SULFATE ANHYDROUS, SODIUM BICARBONATE, SODIUM CHLORIDE, POTASSIUM CHLORIDE 2000 ML: 236; 22.74; 6.74; 5.86; 2.97 POWDER, FOR SOLUTION ORAL at 11:24

## 2020-06-10 RX ADMIN — Medication 10 ML: at 09:01

## 2020-06-10 RX ADMIN — CASTOR OIL AND BALSAM, PERU: 788; 87 OINTMENT TOPICAL at 09:03

## 2020-06-10 RX ADMIN — PANTOPRAZOLE SODIUM 40 MG: 40 TABLET, DELAYED RELEASE ORAL at 06:29

## 2020-06-10 RX ADMIN — MAGNESIUM CITRATE 296 ML: 1.75 LIQUID ORAL at 13:57

## 2020-06-10 RX ADMIN — FUROSEMIDE 20 MG: 20 TABLET ORAL at 08:58

## 2020-06-10 RX ADMIN — SODIUM CHLORIDE: 9 INJECTION, SOLUTION INTRAVENOUS at 14:02

## 2020-06-10 RX ADMIN — METOPROLOL SUCCINATE 25 MG: 50 TABLET, EXTENDED RELEASE ORAL at 08:59

## 2020-06-10 RX ADMIN — LEVOTHYROXINE SODIUM 50 MCG: 50 TABLET ORAL at 06:29

## 2020-06-10 ASSESSMENT — PAIN SCALES - GENERAL
PAINLEVEL_OUTOF10: 0

## 2020-06-10 ASSESSMENT — VISUAL ACUITY: OU: 1

## 2020-06-10 ASSESSMENT — PAIN DESCRIPTION - LOCATION: LOCATION: BACK

## 2020-06-10 ASSESSMENT — PAIN DESCRIPTION - PAIN TYPE: TYPE: ACUTE PAIN

## 2020-06-10 ASSESSMENT — PAIN DESCRIPTION - ORIENTATION: ORIENTATION: LOWER

## 2020-06-10 NOTE — PROGRESS NOTES
Progress Note    Admit Date: 6/9/2020  Day: 2  Diet: Diet NPO Effective Now    CC: Diarrhea    Interval history: Pt is AAOx4 and sitting up in bed. He has multiple BMs overnight but does not seem to have passed any hardened stool representative of the impaction. He states his abd is less distended and denies abd pain except to palpations. He also denies F/C/N/V HA, dizziness, CP/SOB and cough. Medications:     Scheduled Meds:   magnesium citrate  296 mL Oral Once    atorvastatin  80 mg Oral Nightly    Venelex   Topical BID    levothyroxine  50 mcg Oral Daily    tamsulosin  0.4 mg Oral Daily    pantoprazole  40 mg Oral QAM AC    furosemide  20 mg Oral Daily    sodium chloride flush  10 mL Intravenous 2 times per day    enoxaparin  40 mg Subcutaneous Daily    metoprolol succinate  25 mg Oral Daily    sennosides-docusate sodium  2 tablet Oral BID     Continuous Infusions:   sodium chloride       PRN Meds:melatonin, sodium chloride flush, acetaminophen **OR** acetaminophen, promethazine **OR** ondansetron    Objective:   Vitals:   T-max:  Patient Vitals for the past 8 hrs:   BP Temp Temp src Pulse Resp SpO2   06/10/20 1103 134/71 97.2 °F (36.2 °C) Oral 60 18 99 %   06/10/20 0855 135/65 -- -- 66 -- --   06/10/20 0718 134/61 97.3 °F (36.3 °C) Oral 59 16 97 %       Intake/Output Summary (Last 24 hours) at 6/10/2020 1323  Last data filed at 6/10/2020 1104  Gross per 24 hour   Intake 10 ml   Output 1625 ml   Net -1615 ml       Review of Systems  Pertinent positives and negative in Interval history above    Physical Exam  Constitutional:       General: He is not in acute distress. Appearance: Normal appearance. He is not ill-appearing. HENT:      Head: Normocephalic and atraumatic. Eyes:      General: Vision grossly intact. Neck:      Musculoskeletal: Full passive range of motion without pain, normal range of motion and neck supple.    Cardiovascular:      Rate and Rhythm: Normal rate and regular input(s): PHART, SFE1WLL, PO2ART, WWH0JHV, BEART, THGBART, D1ZLIYJL, MSY0VOU in the last 72 hours. INR: No results for input(s): INR in the last 72 hours. Lactate: No results for input(s): LACTATE in the last 72 hours. Cultures:  -----------------------------------------------------------------  RAD:   CT ABDOMEN PELVIS W IV CONTRAST Additional Contrast? None   Final Result   1. Chronic bladder wall thickening, unchanged from prior studies. 2. Large volume fecal material within the rectum. Associated rectal wall thickening and perirectal inflammatory change. This is also similar to prior studies. Correlate clinically for impaction. 3. Loculated left effusion or empyema with adjacent atelectasis. This is unchanged. Assessment/Plan:   Alex Tolentino is a 67 y. o. male who presents with diarrhea x3wks and is being admitted for management of the following      Chronic diarrhea  Likely overflow incontinence. Pt states that he's had dark stool diarrhea x3wkx before which he had severe constipation. CT shows Large volume fecal material within the rectum. Associated rectal wall thickening and perirectal inflammatory change. Similar situation during last admission, requiring GenSurg consult for disempaction  - Hold home iron since this may be contributing to his constipation  - C.diff intermediate - reflex to PCR. F/u  - 2nd half dose of Golytely this AM plus mag citrate PO  - Cont Senokot- S BID  - GI consulted - Flexsig in the AM  - NPO starting now. - Start maintenance fluids     Severe Constipation  Same occurrence during previous admission w/ similar abd CT findings. Likely 2/2 to immobility w/ po iron probably contributing. TSH slightly elevated by free T4 WNL. - Treatment as above  - Daily bowel regimen on discharge.     Chronic normocytic anemia  May be 2/2 to known gastritis. Last EGD(5/4/20):Gastric polyp, Hiatal hernia with mild gastritis without bleeding. Hgb stable since then.  Stools dark, likely 2/2 to iron tablets since H/H stable but Upper GI bleed cannot be r/o. FOBT -ve. Iron studies suggestive of ICD. Stable. - F/u Folate and B12     Chronic loculated left pleural effusion versus empyema   - Stable, chronic  - O/p f/u     HTN  BP WNL currently  - Cont home Toprol and lasix     LE edema  Chronic. Recent dopplers from 3/20 showed no DVTs  - Cont compression stockings/ACE wraps     pAF  Was on full dose aspirin but was d/ce on ASA 81mg due to suspected GI bleed. NSR on Tele in the ED.  - Cont home Metoprolol  - Cont baby ASA      Chronic urinary retention   - Cont Iglesias catheter for continuous bladder drainage  - O/p Urology f/u with plans for [address]      Debility  Worsening weakness in the last 3wks.   - PT/OT consult  - CM consult for placement        Code Status: Full Code   FEN: Cardiac Diet  PPX: Lovenox  DISPO: [x]?  GMF       This patient has been staffed and discussed with Tj Muller MD.   ----------------------------------  Melchor Gordon MD, PGY-1  06/10/20  1:23 PM

## 2020-06-10 NOTE — PROGRESS NOTES
Consult dictated. Plan 1. Flex sig tomorrow following enemas to clear rectal vault. S/p trial with lactulose and portion of golytely yesterday.

## 2020-06-10 NOTE — PROGRESS NOTES
Patient A&O x4, VSS with exception to intermittent bradycardia. Patient with multiple pressure wounds, check LDA. Wound care nurse consulted. Venelex applied to wounds as needed. Specialty mattress in place to prevent further skin breakdown. Patient continues to have soft bowel movements, dark greenish brown colored. Chronic schneider in place with curly colored urine. Patient denies pain at this time. /65   Pulse 66   Temp 97.3 °F (36.3 °C) (Oral)   Resp 16   Ht 5' 11\" (1.803 m)   Wt 224 lb (101.6 kg)   SpO2 97%   BMI 31.24 kg/m²    Patient denies any further needs at this time. Bed is in the lowest position, bed alarm on, patient call light and bedside table are within reach. Will continue to monitor for changes in patient status.       Electronically signed by Mandy Krause on 6/10/2020 at 9:17 AM

## 2020-06-10 NOTE — PROGRESS NOTES
[id number] [facility] Wound Ostomy Continence Nurse  Follow-up Progress Note       NAME:  Son Bernard [facility]  MEDICAL RECORD NUMBER:  [id number]  AGE:  67 y.o. GENDER:  male  :  1940  TODAY'S DATE:  6/10/2020    Subjective:   Wound Identification: sacrum/buttocks/posterior thighs/scrotum  Wound Type: IAD/MASD  Contributing Factors: SEVERE DIARRHEA/? c-DIF, full incontinence    , severe weakness, neurogenic disorder  Patient Goal of Care:  [x] Wound Healing  [] Odor Control  [] Palliative Care  [] Pain Control   [] Other:     Objective:    /71   Pulse 60   Temp 97.2 °F (36.2 °C) (Oral)   Resp 18   Ht 5' 11\" (1.803 m)   Wt 224 lb (101.6 kg)   SpO2 99%   BMI 31.24 kg/m²   Terrance Risk Score: Terrance Scale Score: 15  Assessment:   Measurements:  Wound 19 Knee Anterior;Right dry thick intact scab (Active)   Wound Traumatic 6/10/2020  3:30 PM   Dressing Changed Changed/New 6/10/2020  3:30 PM   Dressing/Treatment Betadine swabs;Open to air 6/10/2020  3:30 PM   Wound Length (cm) 2 cm 6/10/2020  3:30 PM   Wound Width (cm) 2 cm 6/10/2020  3:30 PM   Wound Depth (cm) 0 cm 6/10/2020  3:30 PM   Wound Surface Area (cm^2) 4 cm^2 6/10/2020  3:30 PM   Change in Wound Size % (l*w) -471.43 6/10/2020  3:30 PM   Wound Volume (cm^3) 0 cm^3 6/10/2020  3:30 PM   Wound Assessment Clean;Dry; Intact; Black; Brown 6/10/2020  3:30 PM   Drainage Amount None 6/10/2020  3:30 PM   Odor None 6/10/2020  3:30 PM   Margins Attached edges 6/10/2020  3:30 PM   Number of days: 203       Wound 20 Buttocks Stage 2 bilateral buttocks (Active)   Number of days: 84       Wound 20 Buttocks Mid;Left;Right -coccyx-gluteal cleft-posterior thighs    IAD/moisture associated dermatitis - diarrhea/?C-dif (Active)   Wound Other 6/10/2020  3:26 PM   Dressing/Treatment Pharmaceutical agent (see MAR) 6/10/2020 11:04 AM   Wound Cleansed Other (Comment) 6/10/2020  3:26 PM   Wound Depth (cm) 0.1 cm 6/10/2020  3:26 PM   Wound Assessment Red;Painful;Denuded;Bleeding 6/10/2020  3:26 PM   Margins Undefined edges 6/10/2020  3:26 PM   Number of days: 0       Wound 06/10/20 Ankle Left;Posterior Skin Tear (Active)   Wound Skin Tear 6/10/2020  3:30 PM   Dressing/Treatment Vaseline gauze; Foam 6/10/2020  3:30 PM   Wound Cleansed Rinsed/Irrigated with saline 6/10/2020  3:30 PM   Wound Length (cm) 1 cm 6/10/2020  3:30 PM   Wound Width (cm) 1 cm 6/10/2020  3:30 PM   Wound Depth (cm) 0.1 cm 6/10/2020  3:30 PM   Wound Surface Area (cm^2) 1 cm^2 6/10/2020  3:30 PM   Wound Volume (cm^3) 0.1 cm^3 6/10/2020  3:30 PM   Wound Assessment Red;Purple 6/10/2020  3:30 PM   Drainage Amount Scant 6/10/2020  3:30 PM   Drainage Description Serosanguinous 6/10/2020  3:30 PM   Odor None 6/10/2020  3:30 PM   Margins Undefined edges 6/10/2020  3:30 PM   Rich-wound Assessment Clean;Dry; Intact 6/10/2020  3:30 PM   Number of days: 0     SCROTUM RAW/RED, BLEEDING    Response to treatment:  PAIN WITH CLEANSING AREAS AFFECTED  Plan:   Plan of Care: Wound 06/09/20 Buttocks Mid;Left;Right -coccyx-gluteal cleft-posterior thighs    IAD/moisture associated dermatitis - diarrhea/?C-dif-Dressing/Treatment: Pharmaceutical agent (see MAR)  [REMOVED] Wound 06/09/20 Buttocks Left;Upper-Dressing/Treatment: Pharmaceutical agent (see MAR)  [REMOVED] Wound 06/09/20 Buttocks Left;Mid;Outer-Dressing/Treatment: Pharmaceutical agent (see MAR)  [REMOVED] Wound 06/09/20 Buttocks Left; Lower-Dressing/Treatment: Pharmaceutical agent (see MAR)  [REMOVED] Wound 06/09/20 Heel Right;Distal-Dressing/Treatment: Pharmaceutical agent (see MAR)  Wound 11/19/19 Knee Anterior;Right dry thick intact scab-Dressing/Treatment: Betadine swabs, Open to air  Wound 06/10/20 Ankle Left;Posterior Skin Tear-Dressing/Treatment: Vaseline gauze, Foam    Use rich spray to cleanse to avoid wiping overly    Specialty Bed Required : in place  [x] Low Air Loss   [] Pressure Redistribution  [] Fluid Immersion  [] Bariatric  [] Total Pressure Relief  [] Other:     Current Diet: Diet NPO Effective Now    Patient/Caregiver Teaching:etiology, treatment, offloading  Level of patient/caregiver understanding able to:   [x] Indicates understanding       [] Needs reinforcement  [] Unsuccessful      [x] Verbal Understanding  [] Demonstrated understanding       [] No evidence of learning  [] Refused teaching         [] N/A       Electronically signed by Rosemarie Swenson RN, James Maciel on 6/10/2020 at 3:50 PM

## 2020-06-10 NOTE — PROGRESS NOTES
Occupational Therapy   Occupational Therapy Initial Assessment & Discharge  Date: 6/10/2020   Patient Name: Kanwal Juarez  MRN: [id number]     : 1940    Date of Service: 6/10/2020    Discharge Recommendations:Tushar Block scored a 19/24 on the AM-PAC ADL Inpatient form. Current research shows that an AM-PAC score of 18 or greater is typically associated with a discharge to the patient's home setting. Based on the patient's AM-PAC score, and their current ADL deficits, it is recommended that the patient have 2-3 sessions per week of Occupational Therapy at d/c to increase the patient's independence. At this time, this patient demonstrates the endurance and safety to discharge home with [facility] and a follow up treatment frequency of 2-3x/wk. Please see assessment section for further patient specific details. HOME HEALTH CARE: LEVEL 3 SAFETY     - Initial home health evaluation to occur within 24-48 hours, in patient home   - Therapy evaluations in home within 24-48 hours of discharge; including DME and home safety   - Frontload therapy 5 days, then 3x a week   - Therapy to evaluate if patient has [address] needs for personal care   -  evaluation within 24-48 hours, includes evaluation of resources and insurance to determine [place], [place], LTC, and Medicaid options   If patient discharges prior to next session this note will serve as a discharge summary. Please see below for the latest assessment towards goals. OT Equipment Recommendations  Equipment Needed: No    Assessment   Assessment: 66y M admitted for constipation (was in [facility] in early May and d/c home). Pt reporting he is at baseline, w/ exception to LLE LB dressing - but anticipates once bowel is resolved having no difficulties. Pt has multiple sores on his buttocks and BLE and in session asked when wound nurse would be in. Pt demo ltd insight into current situation and needed care - pt reports plans to d/c home w/ home care.  If home, safety concerns for pt w/o having 24hr assist - and feel that pt would benefit from [facility] to maximize safety and IND in home environment. No eqpt needs at this time. Will sign off for acute OT. Treatment Diagnosis: dec ADL IND d/t constipation  Decision Making: Medium Complexity  OT Education: OT Role  REQUIRES OT FOLLOW UP: No  Activity Tolerance  Activity Tolerance: Patient Tolerated treatment well  Safety Devices  Safety Devices in place: Yes  Type of devices: Call light within reach; Chair alarm in place; Left in chair;Nurse notified           Patient Diagnosis(es): The primary encounter diagnosis was General weakness. A diagnosis of Diarrhea, unspecified type was also pertinent to this visit. has a past medical history of BPH (benign prostatic hyperplasia), Carotid stenosis, Cellulitis, Chronic back pain, Diverticular disease, Erectile dysfunction, GERD (gastroesophageal reflux disease), History of atrial fibrillation, Hypercholesteremia, Hypertension, Lower GI bleed, MDRO (multiple drug resistant organisms) resistance, [facility] Renal insufficiency, Risk for falls, Tinea corporis, and Vitamin B12 deficiency. has a past surgical history that includes back surgery (2006); Foot surgery; Coronary artery bypass graft (12/13/2013); hip surgery (Right, 5/1/2015); Cystoscopy (N/A, 1/10/2019); and Upper gastrointestinal endoscopy (N/A, 5/4/2020). Treatment Diagnosis: dec ADL IND d/t constipation      Restrictions  Position Activity Restriction  Other position/activity restrictions: up as tolerated    Subjective   General  Chart Reviewed: Yes, Orders  Patient assessed for rehabilitation services?: Yes  Additional Pertinent Hx: PMH of BPH and chronic urinary retention, chronic spasticity with chronic back pain on baclofen pump, hx of PAfib, HTN, MDR UTI and severe constipation requiring enemas and disimpaction attempts who presents w/ 3wks of diarrhea.  Pt is wheelchair bound and lives at home. He has a home health who comes 1hr/day, 5s/wk to help with his ADLs and IADLs. This prompted his presentation to the ED. Pt also reports associated diffuse abd pain and worsening generalized weakness. Family / Caregiver Present: No  Referring Practitioner: Valentino Cohens, MD  Diagnosis: consitpation  Patient Currently in Pain: Denies  Vital Signs  Patient Currently in Pain: Denies  Social/Functional History  Social/Functional History  Lives With: Daughter(dtr not 24 hours)  Type of Home: House  Home Layout: One level, Laundry in basement  Home Access: Ramped entrance  Bathroom Shower/Tub: (aide sponge bathes pt; pt utilizes [facility])  Bathroom Toilet: Bedside commode(Pt w/c does not fit into bathroom at home)  Home Equipment: Compiere, Lift chair  Receives Help From: Home health(aide 5x per week for one hour; nursing 3x per week)  ADL Assistance: Needs assistance(assist with dressing/bathing)  Homemaking Assistance: Needs assistance(aide performs cleaning/laundry; pt cooks in [employer])  Ambulation Assistance: (non ambulatory)  Transfer Assistance: ((independent pivot to/from w/c and lift chair)  Active : No  Patient's  Info: gets transportation to [employer]; does own shopping - uses motorized scooter in store; transportation service does bring groceries into the home (drop them off at doorway); Medical Transportation services [facility])  Occupation: Retired  Additional Comments: Pt denies any recent falls. Pt reports he is independent with txfer to/from w/c and lift chair and that he has been suffering with diarrhea x3 weeks but is able to clean himself up. W/C does not fit in BR so pt utilizes [facility] for bowel elimination and had catheter for urine elimination.         Objective   Vision: Within Functional Limits  Hearing: Within functional limits    Orientation  Overall Orientation Status: Within Functional Limits        ADL  Feeding: Independent  Grooming: Setup  UE Bathing: Setup  LE

## 2020-06-10 NOTE — PROGRESS NOTES
home care. Pt demo limited insight into his current situation, deficits, and ability to take care of himself at home. If home, safety concerns for pt without 24 hour assist, home PT safety eval. Pt may benefit from LTC situation long term. No new DME needs identified. Pt with low AMPAC score due to his ambulation/stairs/bed mobility scores but he is nonambulatory at baseline and sleeps in a lift chair. Will sign off for acute PT and recommend nursing continue to encourage pt to be OOB PRn and often with assist.   Treatment Diagnosis: mobility impairment due to constipation  Decision Making: Medium Complexity  Patient Education: Pt educated on PT role, importance of OOB mobility, need to call for assist to get up  and he verbalized understanding. REQUIRES PT FOLLOW UP: No  Activity Tolerance  Activity Tolerance: Patient Tolerated treatment well       Patient Diagnosis(es): The primary encounter diagnosis was General weakness. A diagnosis of Diarrhea, unspecified type was also pertinent to this visit. has a past medical history of BPH (benign prostatic hyperplasia), Carotid stenosis, Cellulitis, Chronic back pain, Diverticular disease, Erectile dysfunction, GERD (gastroesophageal reflux disease), History of atrial fibrillation, Hypercholesteremia, Hypertension, Lower GI bleed, MDRO (multiple drug resistant organisms) resistance, Neuromuscular disorder ([facility] Renal insufficiency, Risk for falls, Tinea corporis, and Vitamin B12 deficiency. has a past surgical history that includes back surgery (2006); Foot surgery; Coronary artery bypass graft (12/13/2013); hip surgery (Right, 5/1/2015); Cystoscopy (N/A, 1/10/2019); and Upper gastrointestinal endoscopy (N/A, 5/4/2020).     Restrictions  Position Activity Restriction  Other position/activity restrictions: up as tolerated  Vision/Hearing  Vision: Within Functional Limits  Hearing: Within functional limits     Subjective  General  Additional Pertinent Hx: 68 yo admitted 6/9/20 for constipation. Abd CT positive large amount of stool. Pmhx: MDRO urine, HTN, fall risk, spasticitiy, afib, chronic back pain, CABG x5. Family / Caregiver Present: No  Diagnosis: constipation  Follows Commands: Within Functional Limits  Subjective  Subjective: Pt found supine in bed and agreeable to PT. \" I need to see a wound care nurse for my butt. \"  Pain Screening  Patient Currently in Pain: Denies  Vital Signs  Patient Currently in Pain: Denies       Orientation  Orientation  Overall Orientation Status: Within Functional Limits  Social/Functional History  Social/Functional History  Lives With: Daughter(dtr not 24 hours)  Type of Home: House  Home Layout: One level, Laundry in basement  Home Access: Ramped entrance  Bathroom Shower/Tub: (aide sponge bathes pt; pt utilizes [facility])  Bathroom Toilet: Bedside commode  Home Equipment: Wheelchair-manual, Lift chair(sleeps in lift chair)  Receives Help From: Home health(aide 5x per week for one hour; nursing 3x per week)  ADL Assistance: (assist with dressing/bathing)  Homemaking Assistance: (aide performs cleaning/laundry; pt cooks in [employer])  Ambulation Assistance: (non ambulatory)  Transfer Assistance: (independent pivot to/from w/c and lift chair)  Active : No  Occupation: Retired  Additional Comments: Pt denies any recent falls. Pt reports he is independent with txfer to/from w/c and lift chair and that he has been suffering with diarrhea x3 weeks but is able to clean himself up. W/C does not fit in  so pt utilizes [facility] for bowel elimination and had catheter for urine elimination.           Objective          AROM RLE (degrees)  RLE AROM: (hip flexion WFL; knee ext approx -30 degrees; ankle DF to neutral)  AROM LLE (degrees)  LLE AROM : (hip flexion WFL; knee ext approx -30 degrees; ankle DF to neutral)  Strength RLE  Strength RLE: (hip flexion 2+5; knee ext 2+/5; ankle DF/PF 3+/5)  Strength LLE  Strength LLE: (hip flexion/knee extension 2+/5; ankle DF/PF 3+/5)        Bed mobility  Rolling to Left: Minimal assistance(x3 trials with rail (pt sleeps in lift chair at home))  Rolling to Right: Minimal assistance(x3 trials and use of rail (pt sleeps in lift chair at home))  Supine to Sit: Stand by assistance(HOB up and use of rail; slow and effortful (pt sleeps in lift chair at home))  Scooting: Stand by assistance  Transfers  Bed to Chair: Supervision(chair positioned in front of pt and he held onto armrests while he performed partial stand with small steps to chair)  Ambulation  Ambulation? : (pt nonambulatory at baseline)     Balance  Sitting - Static: Good  Sitting - Dynamic: Good  Comments: Pt able to sit EOB x 10 mins mod I.         Plan   Safety Devices  Type of devices: Left in chair, Call light within reach, Chair alarm in place, Nurse notified(pt reclined)           AM-PAC Score  AM-PAC Inpatient Mobility Raw Score : 13 (06/10/20 1023)  AM-PAC Inpatient T-Scale Score : 36.74 (06/10/20 1023)  Mobility Inpatient CMS 0-100% Score: 64.91 (06/10/20 1023)  Mobility Inpatient CMS G-Code Modifier : CL (06/10/20 1023)       Therapy Time   Individual Concurrent Group Co-treatment   Time In 0900         Time Out 1000         Minutes 60           Timed Code Treatment Minutes: 50      Total Treatment Minutes:  60       Sarita Crooked, PT

## 2020-06-10 NOTE — PROGRESS NOTES
Patient resting in bed. Call light  and bedside table within reach. Fall precautions in place. Patient has  No needs at this time. Will continue to monitor.

## 2020-06-10 NOTE — CARE COORDINATION
CM spoke with pt at bedside. Pt was agreeable for SNF yesterday, pt is now refusing to go to a SNF. Pt wants to go home with [facility]. CM will also need to call APS. CM will place referral for continue of care to [facility]. Pt is active with [facility] as well. For 5 hours three days a week. Per pt daughter is not involved in his care but lives up stairs. Pt does not want daughter help. Will continue to follow till DC.  Electronically signed by Alice Cates RN on 6/10/2020 at 3:11 PM

## 2020-06-10 NOTE — PLAN OF CARE
Problem: Falls - Risk of:  Goal: Will remain free from falls  Description: Will remain free from falls  Outcome: Ongoing  Note: Patient will not attempt to get out of bed without staff. Patient has no needs will continue to monitor. Problem: Bowel/Gastric:  Goal: Control of bowel function will improve  Description: Control of bowel function will improve  Outcome: Ongoing  Note: Patient has diarrhea, due to medication given. Will continue to monitor.

## 2020-06-10 NOTE — PROGRESS NOTES
NUTRITION ASSESSMENT  Admission Date: 6/9/2020     Type and Reason for Visit: Initial, Positive Nutrition Screen    NUTRITION RECOMMENDATIONS:   1. PO Diet: Advance per GI s/p flex sig 6/11; suggest low fiber diet when able to advance. 2. ONS: Add noe BID for wounds w/diet advancement. NUTRITION ASSESSMENT:  RD triggered for positive nutrition screen for wounds and diarrhea. Pt w/chronic constipation requiring frequent use of enemas and disimpaction. Pt admitted for diarrhea x 3wks and fatigue, c.diff testing. Pt currently NPO for flex sig scheduled per GI for 6/11. RD notes w/stage II wounds, thus increasing nutrient needs. RD will add ONS w/diet advancement and monitor for pt diet tolerance with improvements to diarrhea. Will add ONS and monitor per [facility] this admission. RD did not conduct direct, in-person nutrition evaluation in efforts to reduce exposure and use of PPE for high risk persons, PUI persons, patients who have tested positive for Covid-19 or those awaiting respiratory panel results. Nutrition history and assessment obtained through RD EMR review and RN report. MALNUTRITION ASSESSMENT  Context: Acute illness or injury   Malnutrition Status: At risk for malnutrition  Findings of the 6 clinical characteristics of malnutrition (Minimum of 2 out of 6 clinical characteristics is required to make the diagnosis of moderate or severe Protein Calorie Malnutrition based on AND/ASPEN Guidelines):  Energy Intake %: Unable to assess  Energy Intake Time: Unable to assess  Interpretation of Weight Loss %: No significant weight loss  Interpretation of Weight Loss Time: in 6 months  Body Fat Status: Unable to assess     Muscle Mass Status: Unable to assess     Fluid Accumulation Status: No significant fluid accumulation     Reduced  Strength: Not measured    NUTRITION DIAGNOSIS   Problem: Inadequate Oral Intake  Etiology:  Altered GI function  Signs & Symptoms: Diarrhea and NPO status due to medical condition    NUTRITION INTERVENTION  Food and/or Nutrient Delivery:Continue NPO  Nutrition education/counseling/coordination of care: Continue Inpatient Monitoring     NUTRITION MONITORING & EVALUATION:  Evaluation:Goals set   Goals: Pt will tolerate diet advancement and improve PO intake to consume greater than 50% of meals and supplements offered w/improvement to diarrhea.     Monitoring: Diarrhea , Diet advancement  or GI Function      OBJECTIVE DATA:  · Nutrition-Focused Physical Findings: wcb;   · Wounds Multiple and Stage II     Past Medical History:   Diagnosis Date    BPH (benign prostatic hyperplasia)     Carotid stenosis 12/2013    JESU 82-09% stenosis; LICA 66-41% stenosis    Cellulitis 12/2013    LLE    Chronic back pain     Diverticular disease     Erectile dysfunction     GERD (gastroesophageal reflux disease)     History of atrial fibrillation     Hypercholesteremia     Hypertension     Lower GI bleed     MDRO (multiple drug resistant organisms) resistance 10/26/2019    urine    Neuromuscular disorder (HCC)     spasticity    Renal insufficiency     Risk for falls     uses walker    Tinea corporis 12/2013    LLE    Vitamin B12 deficiency         ANTHROPOMETRICS  Current Height: 5' 11\" (180.3 cm)  Current Weight: 224 lb (101.6 kg)    Admission weight: 224 lb (101.6 kg)  Ideal Bodyweight 166 lb (75.4kg)   Usual Bodyweight stewart    Weight Changes no significant losses        BMI BMI (Calculated): 31.3    Wt Readings from Last 50 Encounters:   06/09/20 224 lb (101.6 kg)   05/04/20 218 lb 14.7 oz (99.3 kg)   03/20/20 207 lb (93.9 kg)   02/10/20 207 lb 3.2 oz (94 kg)   01/20/20 177 lb 4 oz (80.4 kg)   12/17/19 188 lb 7.9 oz (85.5 kg)   11/19/19 182 lb 8 oz (82.8 kg)   10/26/19 182 lb 8 oz (82.8 kg)   09/13/19 225 lb (102.1 kg)   08/30/19 206 lb (93.4 kg)   08/02/19 220 lb (99.8 kg)   07/09/19 224 lb (101.6 kg)   01/11/19 220 lb 14.4 oz (100.2 kg)   01/01/19 204 lb (92.5 kg) COMPARATIVE STANDARDS  Estimated Total Kcals/Day : 22-25  Current Bodyweight (101.6 kg) 4098-0653 kcal    Estimated Total Protein (g/day) : 1.3-1.5 Ideal Bodyweight  (75.4 kg) 98-113g/day  Estimated Daily Total Fluid (ml/day): 9820-2992 mL per day     Food / Nutrition-Related History  Pre-Admission / Home Diet:  Pre-Admission/Home Diet: General   Home Supplements / Herbals:   none noted  Food Restrictions / Cultural Requests:   none noted    Diet Orders / Intake / Nutrition Support  Current diet/supplement order: Diet NPO Effective Now     NSG Recorded PO:   PO Fluids P.O.: 0 mL  PO Meals PO Meals Eaten (%): 0   PO Intake: NPO  PO Supplement: NPO   PO Supplement Intake: NPO  IVF: NS @ 50 ml/hr     NUTRITION RISK LEVEL: Risk Level:  Moderate    Irving Arboleda RD, LD  [place]:  [phone]  Office:  [phone]

## 2020-06-10 NOTE — PLAN OF CARE
Problem: Falls - Risk of:  Goal: Will remain free from falls  Description: Will remain free from falls  6/10/2020 0911 by Megan Mckeon  Outcome: Ongoing  Note: Patient in bed with bed alarm on. Calls out appropriately. Will continue to lay eyes on patient as needed. Problem: Bowel/Gastric:  Goal: Control of bowel function will improve  Description: Control of bowel function will improve  6/10/2020 0911 by Megan Mckeon  Outcome: Ongoing  Note: Patient has been having frequent soft bowel movements. Ordered senna-teresita administered to patient. Will continue to monitor for any changes. Problem: Skin Integrity:  Goal: Risk for impaired skin integrity will decrease  Description: Risk for impaired skin integrity will decrease  Outcome: Ongoing  Note: Patient with multiple wounds on bottom, check LDA. Venelex applied to wounds. Consult to wound care nurse put in. Patient on specialty mattress.  Will continue to monitor patient for any changes

## 2020-06-11 VITALS
OXYGEN SATURATION: 96 % | HEIGHT: 71 IN | BODY MASS INDEX: 29.91 KG/M2 | SYSTOLIC BLOOD PRESSURE: 140 MMHG | HEART RATE: 73 BPM | RESPIRATION RATE: 14 BRPM | WEIGHT: 213.63 LBS | TEMPERATURE: 97.3 F | DIASTOLIC BLOOD PRESSURE: 76 MMHG

## 2020-06-11 LAB
ANION GAP SERPL CALCULATED.3IONS-SCNC: 11 MMOL/L (ref 3–16)
BASOPHILS ABSOLUTE: 0 K/UL (ref 0–0.2)
BASOPHILS RELATIVE PERCENT: 0.6 %
BUN BLDV-MCNC: 14 MG/DL (ref 7–20)
CALCIUM SERPL-MCNC: 8.8 MG/DL (ref 8.3–10.6)
CHLORIDE BLD-SCNC: 107 MMOL/L (ref 99–110)
CO2: 25 MMOL/L (ref 21–32)
CREAT SERPL-MCNC: 0.7 MG/DL (ref 0.8–1.3)
EOSINOPHILS ABSOLUTE: 0.4 K/UL (ref 0–0.6)
EOSINOPHILS RELATIVE PERCENT: 5.2 %
GFR AFRICAN AMERICAN: >60
GFR NON-AFRICAN AMERICAN: >60
GLUCOSE BLD-MCNC: 89 MG/DL (ref 70–99)
HCT VFR BLD CALC: 38.1 % (ref 40.5–52.5)
HEMOGLOBIN: 12.3 G/DL (ref 13.5–17.5)
LYMPHOCYTES ABSOLUTE: 1.3 K/UL (ref 1–5.1)
LYMPHOCYTES RELATIVE PERCENT: 16.9 %
MAGNESIUM: 2.5 MG/DL (ref 1.8–2.4)
MCH RBC QN AUTO: 30.9 PG (ref 26–34)
MCHC RBC AUTO-ENTMCNC: 32.3 G/DL (ref 31–36)
MCV RBC AUTO: 95.7 FL (ref 80–100)
MONOCYTES ABSOLUTE: 0.6 K/UL (ref 0–1.3)
MONOCYTES RELATIVE PERCENT: 7.4 %
NEUTROPHILS ABSOLUTE: 5.5 K/UL (ref 1.7–7.7)
NEUTROPHILS RELATIVE PERCENT: 69.9 %
PDW BLD-RTO: 17.2 % (ref 12.4–15.4)
PLATELET # BLD: 143 K/UL (ref 135–450)
PMV BLD AUTO: 10.1 FL (ref 5–10.5)
POTASSIUM SERPL-SCNC: 3.6 MMOL/L (ref 3.5–5.1)
RBC # BLD: 3.99 M/UL (ref 4.2–5.9)
SODIUM BLD-SCNC: 143 MMOL/L (ref 136–145)
WBC # BLD: 7.8 K/UL (ref 4–11)

## 2020-06-11 PROCEDURE — 7100000010 HC PHASE II RECOVERY - FIRST 15 MIN: Performed by: INTERNAL MEDICINE

## 2020-06-11 PROCEDURE — 83735 ASSAY OF MAGNESIUM: CPT

## 2020-06-11 PROCEDURE — 99152 MOD SED SAME PHYS/QHP 5/>YRS: CPT | Performed by: INTERNAL MEDICINE

## 2020-06-11 PROCEDURE — 2580000003 HC RX 258: Performed by: STUDENT IN AN ORGANIZED HEALTH CARE EDUCATION/TRAINING PROGRAM

## 2020-06-11 PROCEDURE — 96372 THER/PROPH/DIAG INJ SC/IM: CPT

## 2020-06-11 PROCEDURE — 36415 COLL VENOUS BLD VENIPUNCTURE: CPT

## 2020-06-11 PROCEDURE — 6370000000 HC RX 637 (ALT 250 FOR IP): Performed by: STUDENT IN AN ORGANIZED HEALTH CARE EDUCATION/TRAINING PROGRAM

## 2020-06-11 PROCEDURE — 3609008400 HC SIGMOIDOSCOPY DIAGNOSTIC: Performed by: INTERNAL MEDICINE

## 2020-06-11 PROCEDURE — 80048 BASIC METABOLIC PNL TOTAL CA: CPT

## 2020-06-11 PROCEDURE — 6360000002 HC RX W HCPCS: Performed by: STUDENT IN AN ORGANIZED HEALTH CARE EDUCATION/TRAINING PROGRAM

## 2020-06-11 PROCEDURE — 0DJD8ZZ INSPECTION OF LOWER INTESTINAL TRACT, VIA NATURAL OR ARTIFICIAL OPENING ENDOSCOPIC: ICD-10-PCS | Performed by: INTERNAL MEDICINE

## 2020-06-11 PROCEDURE — 6360000002 HC RX W HCPCS: Performed by: INTERNAL MEDICINE

## 2020-06-11 PROCEDURE — 85025 COMPLETE CBC W/AUTO DIFF WBC: CPT

## 2020-06-11 RX ORDER — POLYETHYLENE GLYCOL 3350 17 G/17G
17 POWDER, FOR SOLUTION ORAL DAILY
Qty: 1530 G | Refills: 0 | Status: SHIPPED | OUTPATIENT
Start: 2020-06-11 | End: 2020-09-09

## 2020-06-11 RX ORDER — MIDAZOLAM HYDROCHLORIDE 1 MG/ML
INJECTION INTRAMUSCULAR; INTRAVENOUS PRN
Status: DISCONTINUED | OUTPATIENT
Start: 2020-06-11 | End: 2020-06-11 | Stop reason: ALTCHOICE

## 2020-06-11 RX ADMIN — SODIUM CHLORIDE: 9 INJECTION, SOLUTION INTRAVENOUS at 08:47

## 2020-06-11 RX ADMIN — Medication 10 ML: at 08:45

## 2020-06-11 RX ADMIN — ENOXAPARIN SODIUM 40 MG: 40 INJECTION SUBCUTANEOUS at 08:45

## 2020-06-11 RX ADMIN — SENNOSIDES AND DOCUSATE SODIUM 2 TABLET: 8.6; 5 TABLET ORAL at 17:30

## 2020-06-11 RX ADMIN — CASTOR OIL AND BALSAM, PERU: 788; 87 OINTMENT TOPICAL at 08:44

## 2020-06-11 ASSESSMENT — PAIN SCALES - GENERAL
PAINLEVEL_OUTOF10: 0

## 2020-06-11 NOTE — PROGRESS NOTES
Patient alert and oriented x4. VSS with exception of occasional bradycardia. Patient has multiple pressure wounds. Venelex applied to wounds this am. Patient on a speciality mattress. Patient being turned every 2 hours. Patient had a soft large bowel movement this morning. Chronic schneider in place that is draining curly color urine. Patient has IV fluids infusing. Consent signed and placed on chart. Will continue to monitor.

## 2020-06-11 NOTE — PROGRESS NOTES
Patient A&O x4, VSS with exception to occasional bradycardia. Patient with multiple pressure wounds. Venelex applied to wounds as needed. Specialty mattress in place to prevent further skin breakdown. Patient continues to have soft bowel movements, dark greenish brown colored. Chronic schneider in place with curly colored urine. Patient denies pain. Call light within reach. Bed alarm is on.  Will continue to monitor

## 2020-06-11 NOTE — H&P
History and Physical / Pre-Sedation Assessment    Cindy Chavez is a 67 y.o. male who presents today for colonoscopy procedure. PMHx:    Past Medical History:   Diagnosis Date    BPH (benign prostatic hyperplasia)     Carotid stenosis 12/2013    JESU 80-53% stenosis; LICA 22-49% stenosis    Cellulitis 12/2013    LLE    Chronic back pain     Diverticular disease     Erectile dysfunction     GERD (gastroesophageal reflux disease)     History of atrial fibrillation     Hypercholesteremia     Hypertension     Lower GI bleed     MDRO (multiple drug resistant organisms) resistance 10/26/2019    urine    Neuromuscular disorder (HCC)     spasticity    Renal insufficiency     Risk for falls     uses walker    Tinea corporis 12/2013    LLE    Vitamin B12 deficiency        Medications:    Prior to Admission medications    Medication Sig Start Date End Date Taking? Authorizing Provider   metoprolol succinate (TOPROL XL) 25 MG extended release tablet Take 25 mg by mouth daily   Yes Historical Provider, MD   aspirin 81 MG chewable tablet Take 1 tablet by mouth daily 5/7/20  Yes Eve Sena MD   pantoprazole (PROTONIX) 40 MG tablet Take 1 tablet by mouth every morning (before breakfast) 5/6/20  Yes Eve Sena MD   furosemide (LASIX) 20 MG tablet Take 1 tablet by mouth daily 3/20/20  Yes Ana Busch MD   levothyroxine (SYNTHROID) 50 MCG tablet Take 50 mcg by mouth Daily   Yes Historical Provider, MD   tamsulosin (FLOMAX) 0.4 MG capsule Take 0.4 mg by mouth daily   Yes Historical Provider, MD   ferrous sulfate 325 (65 Fe) MG tablet Take 325 mg by mouth daily (with breakfast)   Yes Historical Provider, MD   atorvastatin (LIPITOR) 80 MG tablet Take 80 mg by mouth nightly.    Yes Historical Provider, MD [employer] [facility]) OINT ointment Apply topically 2 times daily 3/20/20   Ana Busch MD   Melatonin 10 MG TABS Take 10 mg by mouth nightly as needed     Historical Provider, MD   niacin answered.     Mariia Neff MD  6/11/2020

## 2020-06-11 NOTE — PLAN OF CARE
Problem: Skin Integrity:  Goal: Risk for impaired skin integrity will decrease  Description: Risk for impaired skin integrity will decrease  Note: No new skin issue noted. Venelex applied to current wounds.  Will continue to monitor

## 2020-06-11 NOTE — DISCHARGE INSTR - COC
Continuity of Care Form    Patient Name: Jack Reyna   :  1940  MRN:  [id number]    Admit date:  2020  Discharge date:  2020    Code Status Order: Full Code   Advance Directives:   [facility] Documentation     Date/Time Healthcare Directive Type of Healthcare Directive Copy in [facility] [address] Agent's Name Healthcare Agent's Phone Number    20 1205  Yes, patient has an advance directive for healthcare treatment  --  No, copy requested from family  --  --  --    20 1825  Yes, patient has an advance directive for healthcare treatment  Living will  No, copy requested from family  [address] [facility]          Admitting Physician:  Birgit Salmon MD  PCP: Rodney Cárdenas    Discharging Nurse: [facility] Unit/Room#: 2916/6005-46  Discharging Unit Phone Number: [phone]    Emergency Contact:   Extended Emergency Contact Information  Primary Emergency Contact: [place] [address] Phone: [phone]  Work Phone: [phone]  Mobile Phone: [phone]  Relation: Child  Secondary Emergency Contact: Dana Callahan  Address: [address]  Home Phone: [phone]  Relation: Other    Past Surgical History:  Past Surgical History:   Procedure Laterality Date    BACK SURGERY  2006    lower lumbar    CORONARY ARTERY BYPASS GRAFT  2013    CABG x 5 (Dr Vidal Caldwell)   [address] N/A 1/10/2019    CYSTOSCOPY performed by Geovanna Martinez MD at [facility] [address] Right 2015    [facility]    UPPER GASTROINTESTINAL ENDOSCOPY N/A 2020    EGD BIOPSY performed by Shahzad Cano MD at [address] [place] [address] [address] ENDOSCOPY       Immunization History:   Immunization History   Administered Date(s) Administered    Influenza Vaccine, unspecified formulation 2012, 2014, 10/13/2015, 2016, 2017, 10/26/2018, 2019    Influenza Virus Vaccine 2013    Influenza Whole 10/01/2007, 09/02/2010, 09/01/2011, 12/13/2012, 10/13/2015    Pneumococcal Conjugate 13-valent ([id number]) 12/29/2014    Pneumococcal Polysaccharide ([id number]) 01/27/2014       Active Problems:  Patient Active Problem List   Diagnosis Code    Hypotension I95.9    Light headed R42    Cellulitis L03.90    Essential hypertension I10    GERD (gastroesophageal reflux disease) K21.9    Acute blood loss anemia D62    Tinea corporis B35.4    Carotid stenosis I65.29    CAD (coronary artery disease) I25.10    S/P CABG x 5 Z95.1    Lower GI bleeding K92.2    Hip fracture, right ([facility]) S72.001A    Acute right hip pain M25.551    Acute low back pain without sciatica M54.5    Hypothermia T68. Dulcy Loan    Complicated UTI (urinary tract infection) N39.0    ALIS (acute kidney injury) ([address] N17.9    Edema R60.9    Problem with urinary catheter ([facility] [address] T83. 9XXA    Acute encephalopathy G93.40    Chronic indwelling Iglesias catheter Z96.0    Hyperlipidemia E78.5    Bilateral lower leg cellulitis L03.116, L03.115    Neurogenic bladder N31.9    Bacteriuria R82.71    Abnormality of urethral meatus Q64.70    Gross hematuria R31.0    CHF with unknown LVEF ([facility]) I50.9    Dyspnea R06.00    Bradycardia R00.1    Pseudomonas infection A49.8    Acute renal injury ([address] N17.9    Cellulitis of scrotum N49.2    Constipation K59.00    Encopresis with constipation and overflow incontinence R15.9       Isolation/Infection:   Isolation          Contact        Patient Infection Status     Infection Onset Added Last Indicated Last Indicated By Review Planned Expiration Resolved Resolved By    MDRO (multi-drug resistant organism) 05/02/20 05/04/20 05/02/20 Culture, Urine        Resolved    COVID-19 Rule Out 06/10/20 06/10/20 06/10/20 COVID-19 (Ordered)   06/10/20 Rule-Out Test Resulted    C-diff Rule Out 06/09/20 06/09/20 06/09/20 GI Bacterial Pathogens By PCR (Ordered)   06/10/20 Rule-Out Test Resulted    C-diff Rule Out 06/09/20 06/09/20 06/09/20 Clostridium difficile toxin/antigen (Ordered)   06/09/20 Rule-Out Test Resulted    C-diff Rule Out 05/03/20 05/04/20 05/04/20 C. difficile toxin Molecular (Ordered)   05/05/20 Rule-Out Test Resulted    COVID-19 Rule Out 05/03/20 05/03/20 05/03/20 COVID-19 (Ordered)   05/03/20 Rule-Out Test Resulted    C-diff Rule Out 05/02/20 05/02/20 05/03/20 Clostridium difficile toxin/antigen (Ordered)   05/03/20 Rule-Out Test Resulted    MRSA 02/07/20 02/08/20 02/07/20 MRSA DNA Probe, Nasal   05/04/20 Leroy Castro RN    MDRO (multi-drug resistant organism) 10/26/19 10/28/19 11/19/19 Urine culture   01/16/20 Leroy Castro RN          Nurse Assessment:  Last Vital Signs: BP (!) 142/66   Pulse 51   Temp 97.2 °F (36.2 °C) (Oral)   Resp 13   Ht 5' 11\" (1.803 m)   Wt 213 lb 10 oz (96.9 kg)   SpO2 96%   BMI 29.79 kg/m²     Last documented pain score (0-10 scale): Pain Level: 0  Last Weight:   Wt Readings from Last 1 Encounters:   06/11/20 213 lb 10 oz (96.9 kg)     Mental Status:  oriented, alert, coherent, logical, thought processes intact and able to concentrate and follow conversation    IV Access:  - None    Nursing Mobility/ADLs:  Walking   Dependent  Transfer  Dependent  Bathing  Assisted  Dressing  Assisted  Toileting  Assisted  Feeding  Independent  Med Gurmeet Cunas  Independent  Med Delivery   whole    Wound Care Documentation and Therapy:  Wound 11/19/19 Knee Anterior;Right dry thick intact scab (Active)   Wound Traumatic 6/10/2020  3:30 PM   Dressing Changed Changed/New 6/11/2020  4:15 AM   Dressing/Treatment Betadine swabs;Open to air 6/11/2020 11:01 AM   Wound Length (cm) 2 cm 6/10/2020  3:30 PM   Wound Width (cm) 2 cm 6/10/2020  3:30 PM   Wound Depth (cm) 0 cm 6/10/2020  3:30 PM   Wound Surface Area (cm^2) 4 cm^2 6/10/2020  3:30 PM   Change in Wound Size % (l*w) -471.43 6/10/2020  3:30 PM   Wound Volume (cm^3) 0 cm^3 6/10/2020  3:30 PM   Wound Assessment Clean;Dry; Intact; Black; Katja Munoz 6/11/2020 11:01 AM   Drainage Amount None 6/11/2020 11:01 AM   Odor None 6/11/2020 11:01 AM   Margins Attached edges 6/11/2020 11:01 AM   Number of days: 204       Wound 03/18/20 Buttocks Stage 2 bilateral buttocks (Active)   Number of days: 84       Wound 06/09/20 Buttocks Mid;Left;Right -coccyx-gluteal cleft-posterior thighs    IAD/moisture associated dermatitis - diarrhea/?C-dif (Active)   Wound Other 6/10/2020  3:26 PM   Dressing/Treatment Pharmaceutical agent (see MAR) 6/11/2020 11:01 AM   Wound Cleansed Other (Comment) 6/10/2020  3:26 PM   Wound Depth (cm) 0.1 cm 6/10/2020  3:26 PM   Wound Assessment Red;Painful;Denuded;Bleeding 6/11/2020 11:01 AM   Margins Undefined edges 6/11/2020 11:01 AM   Number of days: 1       Wound 06/10/20 Ankle Left;Posterior Skin Tear (Active)   Wound Skin Tear 6/10/2020  3:30 PM   Dressing/Treatment Vaseline gauze; Foam 6/11/2020 11:01 AM   Wound Cleansed Rinsed/Irrigated with saline 6/10/2020  3:30 PM   Wound Length (cm) 1 cm 6/10/2020  3:30 PM   Wound Width (cm) 1 cm 6/10/2020  3:30 PM   Wound Depth (cm) 0.1 cm 6/10/2020  3:30 PM   Wound Surface Area (cm^2) 1 cm^2 6/10/2020  3:30 PM   Wound Volume (cm^3) 0.1 cm^3 6/10/2020  3:30 PM   Wound Assessment Red;Purple 6/11/2020 11:01 AM   Drainage Amount Scant 6/11/2020 11:01 AM   Drainage Description Serosanguinous 6/11/2020 11:01 AM   Odor None 6/11/2020 11:01 AM   Margins Undefined edges 6/11/2020 11:01 AM   Usha-wound Assessment Clean;Dry; Intact 6/11/2020 11:01 AM   Number of days: 0       Wound 06/10/20 Scrotum raw/bleeding   IAD/MASD (Active)   Wound Other 6/10/2020  3:30 PM   Wound Assessment Non-blanchable erythema;Painful 6/11/2020 11:01 AM   Drainage Amount Scant 6/11/2020 11:01 AM   Drainage Description Serosanguinous 6/11/2020 11:01 AM   Odor None 6/11/2020 11:01 AM   Margins Undefined edges 6/11/2020 11:01 AM   Number of days: 0        Elimination:  Continence:   · Bowel: No  · Bladder: No  Urinary Catheter:  Insertion Date: 06/01/2020 Colostomy/Ileostomy/Ileal Conduit: No       Date of Last BM: 6/11/2020    Intake/Output Summary (Last 24 hours) at 6/11/2020 1312  Last data filed at 6/11/2020 1242  Gross per 24 hour   Intake 237 ml   Output 1075 ml   Net -838 ml     I/O last 3 completed shifts: In: 10 [I.V.:10]  Out: [address]    Safety Concerns: At Risk for Falls    Impairments/Disabilities:      None    Nutrition Therapy:  Current Nutrition Therapy:   - Oral Diet:  General    Routes of Feeding: Oral  Liquids: No Restrictions  Daily Fluid Restriction: no  Last Modified Barium Swallow with Video (Video Swallowing Test): not done    Treatments at the Time of Hospital Discharge:   Respiratory Treatments: none  Oxygen Therapy:  is not on home oxygen therapy.   Ventilator:    - No ventilator support    Rehab Therapies: Physical Therapy and Occupational Therapy  Weight Bearing Status/Restrictions: No weight bearing restirctions  Other Medical Equipment (for information only, NOT a DME order):  none  Other Treatments: none    Patient's personal belongings (please select all that are sent with patient):  None    RN SIGNATURE:  Electronically signed by Jordyn Hernandez RN on 6/11/20 at 1:14 PM EDT    CASE MANAGEMENT/SOCIAL WORK SECTION    Inpatient Status Date: ***    Readmission Risk Assessment Score:  Readmission Risk              Risk of Unplanned Readmission:        34           Discharging to Facility/ Agency   · Name:   · Address:  · Phone:  · Fax:    Dialysis Facility (if applicable)   · Name:  · Address:  · Dialysis Schedule:  · Phone:  · Fax:    / signature: {Esignature:[id number]}    PHYSICIAN SECTION    Prognosis: Good    Condition at Discharge: Stable    Rehab Potential (if transferring to Rehab): {Prognosis:[id number]}    Recommended Labs or Other Treatments After Discharge:   - Start taking Miralax daily as prescribed  - STOP taking iron tablets as this could be contributing to you constipation  - Continue taking all you other medications as prescribed  - Make and appointment to see your PCP in 2 weeks  - Chronic schneider maintenance. Replace as previously scheudled    Physician Certification: I certify the above information and transfer of Melinda Marr  is necessary for the continuing treatment of the diagnosis listed and that he requires Home Care for greater 30 days.      Update Admission H&P: No change in H&P    PHYSICIAN SIGNATURE:  Electronically signed by Margarette Colby MD and Estephanie Pedraza MD on 6/11/20 at 1:16 PM EDT

## 2020-06-11 NOTE — BRIEF OP NOTE
Brief Postoperative Note      Patient: Kanwal Juarez  YOB: 1940  MRN: [id number]    Date of Procedure: 6/11/2020    Pre-Op Diagnosis: IMPACTION    Post-Op Diagnosis: Same       Procedure(s):  SIGMOIDOSCOPY DIAGNOSTIC FLEXIBLE    Surgeon(s):  Homer Rios MD    Assistant:  * No surgical staff found *    Anesthesia: IV Sedation    Estimated Blood Loss (mL): Minimal    Complications: None    Specimens:   * No specimens in log *    Implants:  * No implants in log *      Drains:   Urethral Catheter Latex 16 fr (Active)       Urethral Catheter Latex (Active)   Site Assessment No urethral drainage 6/11/2020 11:00 AM   Urine Color Yellow 6/11/2020 11:00 AM   Urine Appearance Sediment 6/11/2020 11:00 AM   Urine Odor Malodorous 6/11/2020 11:00 AM   Output (mL) 150 mL 6/11/2020 11:00 AM       Findings: Flex sig to 70 cm no impaction or abnormalities. Chronic constipation recommend Miralax 17gm QD as inpatient and as outpatient.     Electronically signed by Ryann Jones MD on 6/11/2020 at 12:28 PM

## 2020-06-11 NOTE — PLAN OF CARE
Problem: Falls - Risk of:  Goal: Will remain free from falls  Description: Will remain free from falls  Outcome: Ongoing  Note: Patient is in bed with bed alarm on. Patient has call light within reach. Patient has bedside table next to him. Patient denies any other needs at this time. Bed in the lowest position. Will continue to monitor for changes in status. Problem: Bowel/Gastric:  Goal: Control of bowel function will improve  Description: Control of bowel function will improve  Outcome: Ongoing  Note: Patient has a colonscopy scheduled today. Patient in contact plus isolation for C. Diff. Will continue to monitor. Problem: Skin Integrity:  Goal: Risk for impaired skin integrity will decrease  Description: Risk for impaired skin integrity will decrease  6/11/2020 0932 by Jay Person RN  Outcome: Ongoing  Note: Patient has several areas of skin break down (see LDA). Patient being turned every 2 hours. Patient is on a speciality mattress. Will continue to monitor.

## 2020-06-11 NOTE — DISCHARGE SUMMARY
Hospital Discharge Summary    Patient's PCP: INES JAMIA TRINH  Admit Date: 6/9/2020   Discharge Date: 6/11/2020    Admitting Physician: Dr. Catherine Ruggiero MD  Discharge Physician: Dr. Major Ryan     Brief hospital course: Kathy Newman is a 67 y. o. male with PMH of BPH and chronic urinary retention, planned for SPC; chronic spasticity with chronic back pain on baclofen pump, hx of PAfib, HTN, MDR UTI and severe constipation requiring enemas and disimpaction attempts who presents w/ 3wks of diarrhea. He reports severe constipation prior to its onset. Pt also reports associated diffuse abd pain and worsening generalized weakness. Of note, during this last admission 5/4-5/6/20, pt was so severely constipated that he required GenSurg consult and was only successfully after multiple enema, bowel regiments and multiple disimpaction attempts. In the ED, VS WNL. Labs were sig for Bun 23 and Hgb 12,3 (stable). CT abd/pelvis showed Chronic bladder wall thickening, unchanged from prior studies, Large volume fecal material within the rectum, Associated rectal wall thickening and perirectal inflammatory change, This is also similar to prior studies and Loculated left effusion or empyema with adjacent atelectasis, This is unchanged. ED physician attempted to manually disimpacted pt but could not reach stool ball. Pt was admitted for management of severe constipation and overflow incontinence. FOBT was -ve. He was treated with Sennakot-S, lactulose and smog enemas, Golytely and Mag citrate. GI was consulted who performed a colonoscopy and ensured resolution of impacted stool. Currently, pt is AAOx4 and sitting up in bed. She denies any complaints at this time and PT/OT determined he was at his baseline of functionality. He is stable to be discharge home with home health care. He will start Miralax BID. He will stop taking Iron tablets for now as this may be contributing to his recurring constipation.  He is to make an appointment to see his PCP within 2 weeks. Discharge Diagnoses:   Patient Active Problem List   Diagnosis    Hypotension    Light headed    Cellulitis    Essential hypertension    GERD (gastroesophageal reflux disease)    Acute blood loss anemia    Tinea corporis    Carotid stenosis    CAD (coronary artery disease)    S/P CABG x 5    Lower GI bleeding    Hip fracture, right (HCC)    Acute right hip pain    Acute low back pain without sciatica    Hypothermia    Complicated UTI (urinary tract infection)    ALIS (acute kidney injury) ([address]    Edema    Problem with urinary catheter ([facility])    Acute encephalopathy    Chronic indwelling Iglesias catheter    Hyperlipidemia    Bilateral lower leg cellulitis    Neurogenic bladder    Bacteriuria    Abnormality of urethral meatus    Gross hematuria    CHF with unknown LVEF ([facility])    Dyspnea    Bradycardia    Pseudomonas infection    Acute renal injury [facility]    Cellulitis of scrotum    Constipation    Encopresis with constipation and overflow incontinence       Physical Exam: BP (!) 140/76   Pulse 73   Temp 97.3 °F (36.3 °C) (Oral)   Resp 14   Ht 5' 11\" (1.803 m)   Wt 213 lb 10 oz (96.9 kg)   SpO2 96%   BMI 29.79 kg/m²     No results for input(s): POCGLU in the last 72 hours. Physical Exam   Constitutional:       General: He is not in acute distress. Appearance: Normal appearance. He is not ill-appearing. HENT:      Head: Normocephalic and atraumatic. Eyes:      General: Vision grossly intact. Neck:      Musculoskeletal: Full passive range of motion without pain, normal range of motion and neck supple. Cardiovascular:      Rate and Rhythm: Normal rate and regular rhythm. Pulses: Normal pulses. Heart sounds: Normal heart sounds, S1 normal and S2 normal. No murmur. No friction rub. No gallop. Pulmonary:      Effort: Pulmonary effort is normal. No respiratory distress.       Breath sounds: Normal breath sounds and air entry. No wheezing or rales. Abdominal:      General: Bowel sounds are normal. There is no distension. Palpations: Abdomen is soft. There is mass (In RLQ pt stated is baclofen pump). Tenderness: There is no abdominal tenderness. Genitourinary:     Comments: Chronic schneider in place draining urine  Musculoskeletal: Normal range of motion. Right lower leg: Edema present. Left lower leg: Edema present. Comments: Trace b/l LE edema. Skin:     Capillary Refill: Capillary refill takes less than 2 seconds. Coloration: Skin is not pale. Neurological:      General: No focal deficit present. Mental Status: He is alert and oriented to person, place, and time. Mental status is at baseline. Motor: Weakness (Chronic) present. Psychiatric:         Mood and Affect: Mood normal.         Speech: Speech normal.         Judgment: Judgment normal.        LABS:  Recent Labs     06/11/20  0454   WBC 7.8   HGB 12.3*         Recent Labs     06/11/20  0454      K 3.6      CO2 25   BUN 14   CREATININE 0.7*   GLUCOSE 89     No results for input(s): INR in the last 72 hours. Significant Diagnostic Studies:    CT ABDOMEN PELVIS W IV CONTRAST Additional Contrast? None   Final Result   1. Chronic bladder wall thickening, unchanged from prior studies. 2. Large volume fecal material within the rectum. Associated rectal wall thickening and perirectal inflammatory change. This is also similar to prior studies. Correlate clinically for impaction. 3. Loculated left effusion or empyema with adjacent atelectasis. This is unchanged. Consults:     IP CONSULT TO PRIMARY CARE PROVIDER  IP CONSULT TO HOSPITALIST  IP CONSULT TO CASE MANAGEMENT  IP CONSULT TO GI  IP CONSULT TO HOME CARE NEEDS    Treatments: Refer to brief hospital course.     Discharge Medications:     Medication List      START taking these medications    polyethylene glycol 17 GM/SCOOP powder  Commonly

## 2020-06-11 NOTE — PROGRESS NOTES
Discharge order received. Patient informed of discharge order. Discharge instructions reviewed with patient. Copy of discharge instructions given to patient. Signed prescriptions  given to patient. Patient verbalized understanding, denies needs or questions at this time. Pt will be picked up at 2000 and be transported home.

## 2020-06-12 NOTE — PROCEDURES
[facility] [address], [address]                                 PROCEDURE NOTE    PATIENT NAME: Jayme Wesley                      :        1940  MED REC NO:   [id number]                          ROOM:       3738  ACCOUNT NO:   [de-identified]                           ADMIT DATE: 2020  PROVIDER:     Junior Lim MD    DATE OF PROCEDURE:  2020    PREOPERATIVE DIAGNOSIS:  Evaluation for fecal impaction. POSTOPERATIVE DIAGNOSIS:  Chronic constipation. OPERATION PERFORMED:  Flexible sigmoidoscopy to 70 cm.ebl none    SURGEON:  Junior Lim MD    ANESTHESIA:  2 mg of Versed. COMPLICATIONS:  None. DESCRIPTION OF PROCEDURE:  Informed consent was obtained after  explaining the risks of bleeding, infection, allergy, perforation,  medical and surgical management. Sigmoidoscope was introduced through  the anus, advanced to 70 cm demonstrating some retained fecal material,  but no impaction or obstructing process. Rectum, some retained fecal  material, otherwise, no mucosal lesions. Recovery room in stable  condition. IMPRESSION AND PLAN:  Flexible sigmoidoscopy demonstrates change  compatible to chronic constipation, but no fecal impaction or  significant rectosigmoid pathology. Start MiraLax 17 gm daily both  inpatient and outpatient for chronic constipation. Call if necessary.         Tani Robbins MD    D: 2020 12:31:44       T: 2020 13:04:20     [email or web address]  Job#: [id number]     Doc#: [id number]    CC:

## 2020-06-12 NOTE — PROGRESS NOTES
Pt picked up by [facility]. Pt left with all his belongings. Pt IV removed. Chronic schneider in placed.

## 2020-06-29 ENCOUNTER — OFFICE VISIT (OUTPATIENT)
Dept: CARDIOLOGY CLINIC | Age: 80
End: 2020-06-29
Payer: MEDICARE

## 2020-06-29 VITALS
SYSTOLIC BLOOD PRESSURE: 138 MMHG | TEMPERATURE: 98 F | HEART RATE: 60 BPM | WEIGHT: 202 LBS | BODY MASS INDEX: 28.17 KG/M2 | DIASTOLIC BLOOD PRESSURE: 68 MMHG

## 2020-06-29 PROCEDURE — 99214 OFFICE O/P EST MOD 30 MIN: CPT | Performed by: NURSE PRACTITIONER

## 2020-06-29 PROCEDURE — G8417 CALC BMI ABV UP PARAM F/U: HCPCS | Performed by: NURSE PRACTITIONER

## 2020-06-29 PROCEDURE — 1123F ACP DISCUSS/DSCN MKR DOCD: CPT | Performed by: NURSE PRACTITIONER

## 2020-06-29 PROCEDURE — 4040F PNEUMOC VAC/ADMIN/RCVD: CPT | Performed by: NURSE PRACTITIONER

## 2020-06-29 PROCEDURE — 1111F DSCHRG MED/CURRENT MED MERGE: CPT | Performed by: NURSE PRACTITIONER

## 2020-06-29 PROCEDURE — 1036F TOBACCO NON-USER: CPT | Performed by: NURSE PRACTITIONER

## 2020-06-29 PROCEDURE — G8427 DOCREV CUR MEDS BY ELIG CLIN: HCPCS | Performed by: NURSE PRACTITIONER

## 2020-06-29 RX ORDER — FUROSEMIDE 40 MG/1
40 TABLET ORAL DAILY
Qty: 45 TABLET | Refills: 3 | Status: SHIPPED | OUTPATIENT
Start: 2020-06-29 | End: 2020-11-30

## 2020-06-29 RX ORDER — POTASSIUM CHLORIDE 20 MEQ/1
20 TABLET, EXTENDED RELEASE ORAL DAILY
Qty: 30 TABLET | Refills: 3 | Status: ON HOLD | OUTPATIENT
Start: 2020-06-29 | End: 2021-02-15 | Stop reason: ALTCHOICE

## 2020-06-29 NOTE — PROGRESS NOTES
than 50% spent counseling and coordinating care this patient. Review of Systems:  Constitutional: Denies fatigue, weakness, night sweats or fever. HEENT: Denies new visual changes, ringing in ears, nosebleeds,nasal congestion  Respiratory: Denies new or change in SOB, PND, orthopnea or cough. Cardiovascular: see HPI  GI: Denies N/V, diarrhea, constipation, abdominal pain, change in bowel habits, melena or hematochezia  : Denies urinary frequency, urgency, incontinence, hematuria or dysuria. Skin: Denies rash, hives, or cyanosis  Musculoskeletal: Denies joint or muscle aches/pain  Neurological: Denies syncope or TIA-like symptoms.   Psychiatric: Denies anxiety, insomnia or depression     Past Medical History:   Diagnosis Date    BPH (benign prostatic hyperplasia)     Carotid stenosis 12/2013    JESU 58-82% stenosis; LICA 43-82% stenosis    Cellulitis 12/2013    LLE    Chronic back pain     Diverticular disease     Erectile dysfunction     GERD (gastroesophageal reflux disease)     History of atrial fibrillation     Hypercholesteremia     Hypertension     Lower GI bleed     MDRO (multiple drug resistant organisms) resistance 10/26/2019    urine    Neuromuscular disorder (HCC)     spasticity    Renal insufficiency     Risk for falls     uses walker    Tinea corporis 12/2013    LLE    Vitamin B12 deficiency      Past Surgical History:   Procedure Laterality Date    BACK SURGERY  2006    lower lumbar    CORONARY ARTERY BYPASS GRAFT  12/13/2013    CABG x 5 (Dr Logan Ham)    CYSTOSCOPY N/A 1/10/2019    CYSTOSCOPY performed by Mónica Alvarado MD at [facility] [address] Right 5/1/2015    [facility]    SIGMOIDOSCOPY N/A 6/11/2020    SIGMOIDOSCOPY DIAGNOSTIC FLEXIBLE performed by Renaldo Parikh MD at [address] [facility] N/A 5/4/2020    EGD BIOPSY performed by Renaldo Parikh MD at [facility] ENDOSCOPY     Family History   Problem Relation oriented to person, place, and time. He appears well-developed and well-nourished. In no acute distress. HEENT: Normocephalic and atraumatic. Sclerae anicteric. No xanthelasmas. Conjunctiva white, no subconjunctival hemorrhage   External inspection of ears nose teeth & gums   Eyes:PERRLA EOM's intact. Neck: Neck supple. No JVD present. Carotids without bruits. No mass and no thyromegaly present. No lymphadenopathy present. Cardiovascular: RRR, normal S1 and S2; Soft Murmur noted, no gallop or rub. PMI nondisplaced  Pulmonary/Chest: Effort normal.  Left lower lung diminished Chest wall nontender  Abdominal: soft, nontender, nondistended. + bowel sounds; no organomegaly or bruits. Aorta normal  Extremities: 3+pitting  edema, cyanosis, or clubbing. Pulses are 2+ radial/carotid/dorsalis pedis bilaterally. Cap refill brisk. Neurological: No cranial nerve deficit. Psychiatric: He has a normal mood and affect. His speech is normal and behavior is normal.     Lab Review:   Lab Results   Component Value Date    TRIG 85 12/03/2013    HDL 31 12/03/2013    LDLCALC 96 12/03/2013    LABVLDL 17 12/03/2013     Lab Results   Component Value Date     06/11/2020    K 3.6 06/11/2020    K 3.7 06/09/2020     06/11/2020    CO2 25 06/11/2020    BUN 14 06/11/2020    CREATININE 0.7 06/11/2020    GLUCOSE 89 06/11/2020    CALCIUM 8.8 06/11/2020      Lab Results   Component Value Date    WBC 7.8 06/11/2020    HGB 12.3 (L) 06/11/2020    HCT 38.1 (L) 06/11/2020    MCV 95.7 06/11/2020     06/11/2020       I have reviewed any available labs, images, any stress test, LHC on this pt       Assessment:  There are no diagnoses linked to this encounter.     CAD: stable   -  CABG 2013, Negative Stress Test 2015    HLD:   -  on Lipitor  Optimal    CHF:   -  Lasix 40mg BID x 3 days, then 40mg daily; 20MEQ K-dur daily, blood work PTV    HTN:  - On Toprol 25mg daily      Plan:  -  Take 40 mg Lasix BID x 3 days, after take Lasix 40 mg daily  - Take 20MEQ Kdur daily  -  Follow up with Subha Cabral CNP in 2-3 weeks, get blood work done prior to visit. -  Discussed low salt diet, 2 g limit a day.   -  Limit fluid to about 1500 ml a day   -call for any issues or to ED if becomes SOB or cp     Caitlin DUMONT,CVNP

## 2020-07-23 ENCOUNTER — OFFICE VISIT (OUTPATIENT)
Dept: CARDIOLOGY CLINIC | Age: 80
End: 2020-07-23
Payer: MEDICARE

## 2020-07-23 VITALS
SYSTOLIC BLOOD PRESSURE: 118 MMHG | BODY MASS INDEX: 28.17 KG/M2 | HEART RATE: 78 BPM | DIASTOLIC BLOOD PRESSURE: 76 MMHG | TEMPERATURE: 98.6 F | HEIGHT: 71 IN

## 2020-07-23 PROCEDURE — 1123F ACP DISCUSS/DSCN MKR DOCD: CPT | Performed by: NURSE PRACTITIONER

## 2020-07-23 PROCEDURE — G8428 CUR MEDS NOT DOCUMENT: HCPCS | Performed by: NURSE PRACTITIONER

## 2020-07-23 PROCEDURE — 1036F TOBACCO NON-USER: CPT | Performed by: NURSE PRACTITIONER

## 2020-07-23 PROCEDURE — G8417 CALC BMI ABV UP PARAM F/U: HCPCS | Performed by: NURSE PRACTITIONER

## 2020-07-23 PROCEDURE — 4040F PNEUMOC VAC/ADMIN/RCVD: CPT | Performed by: NURSE PRACTITIONER

## 2020-07-23 PROCEDURE — 99214 OFFICE O/P EST MOD 30 MIN: CPT | Performed by: NURSE PRACTITIONER

## 2020-07-23 NOTE — PROGRESS NOTES
daily, now  Lasix 40mg daily   blood work not completed as ordered  / he needs help to lab /he is in w/c  states less SOB/ c/o non radiating heaviness in left chest area, wax and wanes,   occurring over the last month lasting minutes    lexiscan soon  Fu in 3 weeks     Dariela DUMONT,CVNP

## 2020-07-23 NOTE — PATIENT INSTRUCTIONS
Plan:  c/o non radiating heaviness in left chest area, wax and wanes,   occurring over the last month lasting minutes    lexiscan soon  Fu in 3 weeks

## 2020-08-14 DIAGNOSIS — R07.9 CHEST PAIN, UNSPECIFIED TYPE: ICD-10-CM

## 2020-08-14 DIAGNOSIS — Z86.79 HX OF CORONARY ARTERY DISEASE: ICD-10-CM

## 2020-08-14 DIAGNOSIS — I25.709 ATHEROSCLEROSIS OF CORONARY ARTERY BYPASS GRAFT OF NATIVE HEART WITH ANGINA PECTORIS (HCC): ICD-10-CM

## 2020-08-14 LAB
A/G RATIO: 1 (ref 1.1–2.2)
ALBUMIN SERPL-MCNC: 4 G/DL (ref 3.4–5)
ALP BLD-CCNC: 222 U/L (ref 40–129)
ALT SERPL-CCNC: 12 U/L (ref 10–40)
ANION GAP SERPL CALCULATED.3IONS-SCNC: 14 MMOL/L (ref 3–16)
AST SERPL-CCNC: 13 U/L (ref 15–37)
BILIRUB SERPL-MCNC: 0.5 MG/DL (ref 0–1)
BILIRUBIN DIRECT: <0.2 MG/DL (ref 0–0.3)
BILIRUBIN, INDIRECT: NORMAL MG/DL (ref 0–1)
BUN BLDV-MCNC: 35 MG/DL (ref 7–20)
CALCIUM SERPL-MCNC: 9.3 MG/DL (ref 8.3–10.6)
CHLORIDE BLD-SCNC: 102 MMOL/L (ref 99–110)
CHOLESTEROL, TOTAL: 108 MG/DL (ref 0–199)
CO2: 25 MMOL/L (ref 21–32)
CREAT SERPL-MCNC: 1.4 MG/DL (ref 0.8–1.3)
GFR AFRICAN AMERICAN: 59
GFR NON-AFRICAN AMERICAN: 49
GLOBULIN: 3.9 G/DL
GLUCOSE BLD-MCNC: 98 MG/DL (ref 70–99)
HCT VFR BLD CALC: 43.1 % (ref 40.5–52.5)
HDLC SERPL-MCNC: 32 MG/DL (ref 40–60)
HEMOGLOBIN: 14.4 G/DL (ref 13.5–17.5)
LDL CHOLESTEROL CALCULATED: 63 MG/DL
MAGNESIUM: 3.1 MG/DL (ref 1.8–2.4)
MCH RBC QN AUTO: 32 PG (ref 26–34)
MCHC RBC AUTO-ENTMCNC: 33.3 G/DL (ref 31–36)
MCV RBC AUTO: 96 FL (ref 80–100)
PDW BLD-RTO: 14.8 % (ref 12.4–15.4)
PLATELET # BLD: 168 K/UL (ref 135–450)
PMV BLD AUTO: 10.4 FL (ref 5–10.5)
POTASSIUM SERPL-SCNC: 3.9 MMOL/L (ref 3.5–5.1)
RBC # BLD: 4.49 M/UL (ref 4.2–5.9)
SODIUM BLD-SCNC: 141 MMOL/L (ref 136–145)
TOTAL PROTEIN: 7.9 G/DL (ref 6.4–8.2)
TRIGL SERPL-MCNC: 64 MG/DL (ref 0–150)
VLDLC SERPL CALC-MCNC: 13 MG/DL
WBC # BLD: 6.7 K/UL (ref 4–11)

## 2020-08-15 LAB
TSH REFLEX FT4: 3.91 UIU/ML (ref 0.27–4.2)
VITAMIN D 25-HYDROXY: 32.6 NG/ML

## 2020-08-18 ENCOUNTER — HOSPITAL ENCOUNTER (OUTPATIENT)
Dept: NON INVASIVE DIAGNOSTICS | Age: 80
Discharge: HOME | DRG: 287 | End: 2020-08-18
Payer: MEDICARE

## 2020-08-18 ENCOUNTER — HOSPITAL ENCOUNTER (INPATIENT)
Dept: NON INVASIVE DIAGNOSTICS | Age: 80
LOS: 2 days | Discharge: HOME | DRG: 287 | End: 2020-08-20
Attending: INTERNAL MEDICINE | Admitting: INTERNAL MEDICINE
Payer: MEDICARE

## 2020-08-18 VITALS — DIASTOLIC BLOOD PRESSURE: 73 MMHG | HEART RATE: 70 BPM | SYSTOLIC BLOOD PRESSURE: 136 MMHG

## 2020-08-18 PROBLEM — R94.39 ABNORMAL STRESS TEST: Status: ACTIVE | Noted: 2020-08-18

## 2020-08-18 LAB
ANION GAP SERPL CALCULATED.3IONS-SCNC: 10 MMOL/L (ref 3–16)
BASOPHILS ABSOLUTE: 0 K/UL (ref 0–0.2)
BASOPHILS RELATIVE PERCENT: 0.4 %
BUN BLDV-MCNC: 37 MG/DL (ref 7–20)
CALCIUM SERPL-MCNC: 9 MG/DL (ref 8.3–10.6)
CHLORIDE BLD-SCNC: 101 MMOL/L (ref 99–110)
CO2: 27 MMOL/L (ref 21–32)
CREAT SERPL-MCNC: 1.2 MG/DL (ref 0.8–1.3)
EOSINOPHILS ABSOLUTE: 0.1 K/UL (ref 0–0.6)
EOSINOPHILS RELATIVE PERCENT: 1.1 %
GFR AFRICAN AMERICAN: >60
GFR NON-AFRICAN AMERICAN: 58
GLUCOSE BLD-MCNC: 117 MG/DL (ref 70–99)
HCT VFR BLD CALC: 40.6 % (ref 40.5–52.5)
HEMOGLOBIN: 13.8 G/DL (ref 13.5–17.5)
LV EF: 68 %
LVEF MODALITY: NORMAL
LYMPHOCYTES ABSOLUTE: 1.2 K/UL (ref 1–5.1)
LYMPHOCYTES RELATIVE PERCENT: 11.4 %
MCH RBC QN AUTO: 32.7 PG (ref 26–34)
MCHC RBC AUTO-ENTMCNC: 34 G/DL (ref 31–36)
MCV RBC AUTO: 96.2 FL (ref 80–100)
MONOCYTES ABSOLUTE: 0.6 K/UL (ref 0–1.3)
MONOCYTES RELATIVE PERCENT: 5.6 %
NEUTROPHILS ABSOLUTE: 8.9 K/UL (ref 1.7–7.7)
NEUTROPHILS RELATIVE PERCENT: 81.5 %
PDW BLD-RTO: 14.2 % (ref 12.4–15.4)
PLATELET # BLD: 164 K/UL (ref 135–450)
PMV BLD AUTO: 8.9 FL (ref 5–10.5)
POTASSIUM REFLEX MAGNESIUM: 4.1 MMOL/L (ref 3.5–5.1)
RBC # BLD: 4.22 M/UL (ref 4.2–5.9)
SODIUM BLD-SCNC: 138 MMOL/L (ref 136–145)
WBC # BLD: 10.9 K/UL (ref 4–11)

## 2020-08-18 PROCEDURE — 6360000002 HC RX W HCPCS: Performed by: INTERNAL MEDICINE

## 2020-08-18 PROCEDURE — 6360000002 HC RX W HCPCS

## 2020-08-18 PROCEDURE — 3430000000 HC RX DIAGNOSTIC RADIOPHARMACEUTICAL: Performed by: NURSE PRACTITIONER

## 2020-08-18 PROCEDURE — 1200000000 HC SEMI PRIVATE

## 2020-08-18 PROCEDURE — 2580000003 HC RX 258: Performed by: STUDENT IN AN ORGANIZED HEALTH CARE EDUCATION/TRAINING PROGRAM

## 2020-08-18 PROCEDURE — 6360000002 HC RX W HCPCS: Performed by: NURSE PRACTITIONER

## 2020-08-18 PROCEDURE — 6370000000 HC RX 637 (ALT 250 FOR IP)

## 2020-08-18 PROCEDURE — 6370000000 HC RX 637 (ALT 250 FOR IP): Performed by: STUDENT IN AN ORGANIZED HEALTH CARE EDUCATION/TRAINING PROGRAM

## 2020-08-18 PROCEDURE — 2500000003 HC RX 250 WO HCPCS

## 2020-08-18 PROCEDURE — 6360000002 HC RX W HCPCS: Performed by: STUDENT IN AN ORGANIZED HEALTH CARE EDUCATION/TRAINING PROGRAM

## 2020-08-18 PROCEDURE — 36415 COLL VENOUS BLD VENIPUNCTURE: CPT

## 2020-08-18 PROCEDURE — 93005 ELECTROCARDIOGRAM TRACING: CPT | Performed by: INTERNAL MEDICINE

## 2020-08-18 PROCEDURE — 80048 BASIC METABOLIC PNL TOTAL CA: CPT

## 2020-08-18 PROCEDURE — 93017 CV STRESS TEST TRACING ONLY: CPT

## 2020-08-18 PROCEDURE — 85025 COMPLETE CBC W/AUTO DIFF WBC: CPT

## 2020-08-18 PROCEDURE — 78452 HT MUSCLE IMAGE SPECT MULT: CPT

## 2020-08-18 PROCEDURE — A9502 TC99M TETROFOSMIN: HCPCS | Performed by: NURSE PRACTITIONER

## 2020-08-18 PROCEDURE — 6370000000 HC RX 637 (ALT 250 FOR IP): Performed by: INTERNAL MEDICINE

## 2020-08-18 PROCEDURE — 2580000003 HC RX 258: Performed by: NURSE PRACTITIONER

## 2020-08-18 RX ORDER — ACETAMINOPHEN 325 MG/1
650 TABLET ORAL EVERY 6 HOURS PRN
Status: DISCONTINUED | OUTPATIENT
Start: 2020-08-18 | End: 2020-08-18 | Stop reason: SDUPTHER

## 2020-08-18 RX ORDER — SODIUM CHLORIDE 9 MG/ML
INJECTION, SOLUTION INTRAVENOUS CONTINUOUS
Status: DISCONTINUED | OUTPATIENT
Start: 2020-08-18 | End: 2020-08-19 | Stop reason: HOSPADM

## 2020-08-18 RX ORDER — POLYETHYLENE GLYCOL 3350 17 G/17G
17 POWDER, FOR SOLUTION ORAL DAILY PRN
Status: DISCONTINUED | OUTPATIENT
Start: 2020-08-18 | End: 2020-08-20 | Stop reason: HOSPADM

## 2020-08-18 RX ORDER — TAMSULOSIN HYDROCHLORIDE 0.4 MG/1
0.4 CAPSULE ORAL DAILY
Status: DISCONTINUED | OUTPATIENT
Start: 2020-08-18 | End: 2020-08-18 | Stop reason: SDUPTHER

## 2020-08-18 RX ORDER — ONDANSETRON 2 MG/ML
4 INJECTION INTRAMUSCULAR; INTRAVENOUS EVERY 6 HOURS PRN
Status: DISCONTINUED | OUTPATIENT
Start: 2020-08-18 | End: 2020-08-18 | Stop reason: SDUPTHER

## 2020-08-18 RX ORDER — ASPIRIN 81 MG/1
81 TABLET, CHEWABLE ORAL DAILY
Status: DISCONTINUED | OUTPATIENT
Start: 2020-08-18 | End: 2020-08-18 | Stop reason: SDUPTHER

## 2020-08-18 RX ORDER — LANOLIN ALCOHOL/MO/W.PET/CERES
1000 CREAM (GRAM) TOPICAL DAILY
COMMUNITY

## 2020-08-18 RX ORDER — POLYETHYLENE GLYCOL 3350 17 G/17G
17 POWDER, FOR SOLUTION ORAL DAILY PRN
Status: DISCONTINUED | OUTPATIENT
Start: 2020-08-18 | End: 2020-08-18 | Stop reason: SDUPTHER

## 2020-08-18 RX ORDER — ZOLPIDEM TARTRATE 5 MG/1
10 TABLET ORAL NIGHTLY PRN
Status: DISCONTINUED | OUTPATIENT
Start: 2020-08-18 | End: 2020-08-20 | Stop reason: HOSPADM

## 2020-08-18 RX ORDER — SODIUM CHLORIDE 0.9 % (FLUSH) 0.9 %
10 SYRINGE (ML) INJECTION PRN
Status: DISCONTINUED | OUTPATIENT
Start: 2020-08-18 | End: 2020-08-19 | Stop reason: HOSPADM

## 2020-08-18 RX ORDER — SODIUM CHLORIDE 0.9 % (FLUSH) 0.9 %
10 SYRINGE (ML) INJECTION EVERY 12 HOURS SCHEDULED
Status: DISCONTINUED | OUTPATIENT
Start: 2020-08-18 | End: 2020-08-20 | Stop reason: HOSPADM

## 2020-08-18 RX ORDER — TAMSULOSIN HYDROCHLORIDE 0.4 MG/1
0.4 CAPSULE ORAL DAILY
Status: DISCONTINUED | OUTPATIENT
Start: 2020-08-18 | End: 2020-08-20 | Stop reason: HOSPADM

## 2020-08-18 RX ORDER — PROMETHAZINE HYDROCHLORIDE 12.5 MG/1
12.5 TABLET ORAL EVERY 6 HOURS PRN
Status: DISCONTINUED | OUTPATIENT
Start: 2020-08-18 | End: 2020-08-18 | Stop reason: SDUPTHER

## 2020-08-18 RX ORDER — LEVOTHYROXINE SODIUM 0.05 MG/1
50 TABLET ORAL DAILY
Status: DISCONTINUED | OUTPATIENT
Start: 2020-08-18 | End: 2020-08-18

## 2020-08-18 RX ORDER — PROMETHAZINE HYDROCHLORIDE 12.5 MG/1
12.5 TABLET ORAL EVERY 6 HOURS PRN
Status: DISCONTINUED | OUTPATIENT
Start: 2020-08-18 | End: 2020-08-20 | Stop reason: HOSPADM

## 2020-08-18 RX ORDER — SODIUM CHLORIDE 0.9 % (FLUSH) 0.9 %
10 SYRINGE (ML) INJECTION PRN
Status: DISCONTINUED | OUTPATIENT
Start: 2020-08-18 | End: 2020-08-18 | Stop reason: SDUPTHER

## 2020-08-18 RX ORDER — UREA 10 %
10 LOTION (ML) TOPICAL NIGHTLY PRN
Status: DISCONTINUED | OUTPATIENT
Start: 2020-08-18 | End: 2020-08-20 | Stop reason: HOSPADM

## 2020-08-18 RX ORDER — SODIUM CHLORIDE 0.9 % (FLUSH) 0.9 %
10 SYRINGE (ML) INJECTION 2 TIMES DAILY
Status: DISCONTINUED | OUTPATIENT
Start: 2020-08-18 | End: 2020-08-18 | Stop reason: SDUPTHER

## 2020-08-18 RX ORDER — AMINOPHYLLINE DIHYDRATE 25 MG/ML
75 INJECTION, SOLUTION INTRAVENOUS ONCE
Status: DISCONTINUED | OUTPATIENT
Start: 2020-08-18 | End: 2020-08-18

## 2020-08-18 RX ORDER — PANTOPRAZOLE SODIUM 40 MG/1
40 TABLET, DELAYED RELEASE ORAL
Status: DISCONTINUED | OUTPATIENT
Start: 2020-08-19 | End: 2020-08-20 | Stop reason: HOSPADM

## 2020-08-18 RX ORDER — SODIUM CHLORIDE 0.9 % (FLUSH) 0.9 %
10 SYRINGE (ML) INJECTION EVERY 12 HOURS SCHEDULED
Status: DISCONTINUED | OUTPATIENT
Start: 2020-08-18 | End: 2020-08-18 | Stop reason: SDUPTHER

## 2020-08-18 RX ORDER — ATORVASTATIN CALCIUM 40 MG/1
80 TABLET, FILM COATED ORAL NIGHTLY
Status: DISCONTINUED | OUTPATIENT
Start: 2020-08-18 | End: 2020-08-20 | Stop reason: HOSPADM

## 2020-08-18 RX ORDER — ACETAMINOPHEN 325 MG/1
650 TABLET ORAL EVERY 6 HOURS PRN
Status: DISCONTINUED | OUTPATIENT
Start: 2020-08-18 | End: 2020-08-19 | Stop reason: SDUPTHER

## 2020-08-18 RX ORDER — ASPIRIN 81 MG/1
81 TABLET, CHEWABLE ORAL DAILY
Status: DISCONTINUED | OUTPATIENT
Start: 2020-08-18 | End: 2020-08-20 | Stop reason: HOSPADM

## 2020-08-18 RX ORDER — ONDANSETRON 2 MG/ML
4 INJECTION INTRAMUSCULAR; INTRAVENOUS EVERY 6 HOURS PRN
Status: DISCONTINUED | OUTPATIENT
Start: 2020-08-18 | End: 2020-08-20 | Stop reason: HOSPADM

## 2020-08-18 RX ORDER — MAGNESIUM SULFATE IN WATER 40 MG/ML
2 INJECTION, SOLUTION INTRAVENOUS PRN
Status: DISCONTINUED | OUTPATIENT
Start: 2020-08-18 | End: 2020-08-20 | Stop reason: HOSPADM

## 2020-08-18 RX ORDER — PANTOPRAZOLE SODIUM 40 MG/1
40 TABLET, DELAYED RELEASE ORAL
Status: DISCONTINUED | OUTPATIENT
Start: 2020-08-19 | End: 2020-08-18 | Stop reason: SDUPTHER

## 2020-08-18 RX ORDER — POTASSIUM CHLORIDE 7.45 MG/ML
10 INJECTION INTRAVENOUS PRN
Status: DISCONTINUED | OUTPATIENT
Start: 2020-08-18 | End: 2020-08-20 | Stop reason: HOSPADM

## 2020-08-18 RX ORDER — SODIUM CHLORIDE 9 MG/ML
INJECTION, SOLUTION INTRAVENOUS CONTINUOUS
Status: DISCONTINUED | OUTPATIENT
Start: 2020-08-18 | End: 2020-08-18 | Stop reason: SDUPTHER

## 2020-08-18 RX ORDER — ATORVASTATIN CALCIUM 80 MG/1
80 TABLET, FILM COATED ORAL NIGHTLY
Status: DISCONTINUED | OUTPATIENT
Start: 2020-08-18 | End: 2020-08-18 | Stop reason: SDUPTHER

## 2020-08-18 RX ORDER — ACETAMINOPHEN 650 MG/1
650 SUPPOSITORY RECTAL EVERY 6 HOURS PRN
Status: DISCONTINUED | OUTPATIENT
Start: 2020-08-18 | End: 2020-08-18 | Stop reason: SDUPTHER

## 2020-08-18 RX ORDER — 0.9 % SODIUM CHLORIDE 0.9 %
500 INTRAVENOUS SOLUTION INTRAVENOUS ONCE
Status: DISCONTINUED | OUTPATIENT
Start: 2020-08-18 | End: 2020-08-18

## 2020-08-18 RX ORDER — LEVOTHYROXINE SODIUM 0.05 MG/1
50 TABLET ORAL DAILY
Status: DISCONTINUED | OUTPATIENT
Start: 2020-08-18 | End: 2020-08-18 | Stop reason: SDUPTHER

## 2020-08-18 RX ORDER — ACETAMINOPHEN 650 MG/1
650 SUPPOSITORY RECTAL EVERY 6 HOURS PRN
Status: DISCONTINUED | OUTPATIENT
Start: 2020-08-18 | End: 2020-08-20 | Stop reason: HOSPADM

## 2020-08-18 RX ADMIN — ZOLPIDEM TARTRATE 10 MG: 5 TABLET ORAL at 22:18

## 2020-08-18 RX ADMIN — TETROFOSMIN 30 MILLICURIE: 1.38 INJECTION, POWDER, LYOPHILIZED, FOR SOLUTION INTRAVENOUS at 14:41

## 2020-08-18 RX ADMIN — Medication 10 MG: at 22:18

## 2020-08-18 RX ADMIN — AMINOPHYLLINE 75 MG: 25 INJECTION, SOLUTION INTRAVENOUS at 15:08

## 2020-08-18 RX ADMIN — AMINOPHYLLINE 75 MG: 25 INJECTION, SOLUTION INTRAVENOUS at 14:50

## 2020-08-18 RX ADMIN — Medication 10 ML: at 21:01

## 2020-08-18 RX ADMIN — ENOXAPARIN SODIUM 40 MG: 40 INJECTION SUBCUTANEOUS at 18:37

## 2020-08-18 RX ADMIN — ATORVASTATIN CALCIUM 80 MG: 40 TABLET, FILM COATED ORAL at 21:01

## 2020-08-18 RX ADMIN — TAMSULOSIN HYDROCHLORIDE 0.4 MG: 0.4 CAPSULE ORAL at 18:38

## 2020-08-18 RX ADMIN — Medication 10 ML: at 13:35

## 2020-08-18 RX ADMIN — REGADENOSON 0.4 MG: 0.08 INJECTION, SOLUTION INTRAVENOUS at 14:41

## 2020-08-18 RX ADMIN — ASPIRIN 81 MG: 81 TABLET, CHEWABLE ORAL at 18:38

## 2020-08-18 RX ADMIN — TETROFOSMIN 10 MILLICURIE: 1.38 INJECTION, POWDER, LYOPHILIZED, FOR SOLUTION INTRAVENOUS at 13:35

## 2020-08-18 RX ADMIN — Medication 10 ML: at 14:41

## 2020-08-18 ASSESSMENT — ENCOUNTER SYMPTOMS
BACK PAIN: 0
ABDOMINAL DISTENTION: 0
CONSTIPATION: 0
CHEST TIGHTNESS: 0
COUGH: 0
WHEEZING: 0
NAUSEA: 0
SHORTNESS OF BREATH: 0
DIARRHEA: 0
CHOKING: 0
STRIDOR: 0

## 2020-08-18 NOTE — H&P
Cardiology  PGY 3  History & Physical      CC : Abnormal stress test     History Obtained From:  patient, electronic medical record    HISTORY OF PRESENT ILLNESS:  Maritza Gray is a 67year old male with PMHx CAD s/p CABG (2013), HFpEF (Echo 1/20- EF 55-60%) , HLD who presented to the hospital for an outpatient stress test. Patient had complained of non-radiating, left sided chest heaviness occurring over the last month. The chest heaviness lasts only a few minutes when it occurs, not related to exertion and resolves on its own. His chest heaviness is not associated with any shortness of breath, diaphoresis, or nausea. During patients stress today he became unresponsive to verbal and tactile stimuli. HR dropped to 36 bpm from 52 and Bp 71/40 from 107/63 . He was given aminophylline 75 mg IV x 2. The patient become alert and responsive. With normalization of BP. He was noted to have a five second pause during the episode. Currently he is alert and oriented x 4. He denies any nausea, dizziness, chest pain, palpitations, SOB.      Past Medical History:        Diagnosis Date    BPH (benign prostatic hyperplasia)     Carotid stenosis 12/2013    JESU 26-16% stenosis; LICA 62-05% stenosis    Cellulitis 12/2013    LLE    Chronic back pain     Diverticular disease     Erectile dysfunction     GERD (gastroesophageal reflux disease)     History of atrial fibrillation     Hypercholesteremia     Hypertension     Lower GI bleed     MDRO (multiple drug resistant organisms) resistance 10/26/2019    urine    Neuromuscular disorder (HCC)     spasticity    Renal insufficiency     Risk for falls     uses walker    Tinea corporis 12/2013    LLE    Vitamin B12 deficiency        Past Surgical History:        Procedure Laterality Date    BACK SURGERY  2006    lower lumbar    CORONARY ARTERY BYPASS GRAFT  12/13/2013    CABG x 5 (Dr Miriam Aggarwal)   [facility] N/A 1/10/2019    CYSTOSCOPY performed by Stefan Perez MD at [address] Right 5/1/2015    [facility]    SIGMOIDOSCOPY N/A 6/11/2020    SIGMOIDOSCOPY DIAGNOSTIC FLEXIBLE performed by Denise Rojas MD at [address] N/A 5/4/2020    EGD BIOPSY performed by Denise Rojas MD at [facility] ENDOSCOPY       Medications Priorto Admission:    Medications Prior to Admission: furosemide (LASIX) 40 MG tablet, Take 1 tablet by mouth daily  potassium chloride (KLOR-CON M) 20 MEQ extended release tablet, Take 1 tablet by mouth daily  polyethylene glycol (GLYCOLAX) 17 GM/SCOOP powder, Take 17 g by mouth daily  metoprolol succinate (TOPROL XL) 25 MG extended release tablet, Take 25 mg by mouth daily  aspirin 81 MG chewable tablet, Take 1 tablet by mouth daily  pantoprazole (PROTONIX) 40 MG tablet, Take 1 tablet by mouth every morning (before breakfast)  Balsam [place]-Castor Oil (VENELEX) OINT ointment, Apply topically 2 times daily  levothyroxine (SYNTHROID) 50 MCG tablet, Take 50 mcg by mouth Daily  tamsulosin (FLOMAX) 0.4 MG capsule, Take 0.4 mg by mouth daily  Melatonin 10 MG TABS, Take 10 mg by mouth nightly as needed   niacin 500 MG tablet, Take 1 tablet by mouth daily as needed (for spasticity)   atorvastatin (LIPITOR) 80 MG tablet, Take 80 mg by mouth nightly. zolpidem (AMBIEN) 10 MG tablet, Take 10 mg by mouth nightly as needed for Sleep. Allergies:  Bactrim [sulfamethoxazole-trimethoprim]    Social History:   · TOBACCO:   reports that he quit smoking about 50 years ago. He has never used smokeless tobacco.  · ETOH:   reports no history of alcohol use. ·   Family History:       Problem Relation Age of Onset    Heart Disease Father        Review of Systems   Constitutional: Negative for activity change, appetite change, chills, diaphoresis, fatigue and fever. HENT: Negative for congestion. Respiratory: Negative for cough, choking, chest tightness, shortness of breath, wheezing and stridor. Cardiovascular: Negative for chest pain, palpitations and leg swelling. Gastrointestinal: Negative for abdominal distention, constipation, diarrhea and nausea. Musculoskeletal: Negative for back pain. Neurological: Negative for dizziness, seizures, light-headedness, numbness and headaches. Psychiatric/Behavioral: Negative for agitation. ROS: A 10 point review of systems was conducted, significant findings as noted in HPI. Physical Exam  Constitutional:       Appearance: Normal appearance. He is normal weight. HENT:      Head: Normocephalic. Nose: Nose normal.      Mouth/Throat:      Mouth: Mucous membranes are moist.   Eyes:      Pupils: Pupils are equal, round, and reactive to light. Neck:      Musculoskeletal: Normal range of motion. Cardiovascular:      Rate and Rhythm: Normal rate and regular rhythm. Heart sounds: Murmur present. Pulmonary:      Effort: Pulmonary effort is normal. No respiratory distress. Breath sounds: Normal breath sounds. No stridor. No wheezing, rhonchi or rales. Chest:      Chest wall: No tenderness. Abdominal:      General: Abdomen is flat. Musculoskeletal: Normal range of motion. Skin:     General: Skin is warm. Neurological:      General: No focal deficit present. Mental Status: He is alert and oriented to person, place, and time. Psychiatric:         Mood and Affect: Mood normal.       Physical exam:       There were no vitals filed for this visit. DATA:    Labs:  CBC: No results for input(s): WBC, HGB, HCT, PLT in the last 72 hours. BMP: No results for input(s): NA, K, CL, CO2, BUN, CREATININE, GLUCOSE, PHOS in the last 72 hours. Invalid input(s):  CA  LFT's: No results for input(s): AST, ALT, ALB, BILITOT, ALKPHOS in the last 72 hours. Troponin: No results for input(s): TROPONINI in the last 72 hours. BNP:No results for input(s): BNP in the last 72 hours.   ABGs: No results for input(s): PHART, LDT7LTM, PO2ART in the last 72 hours. INR: No results for input(s): INR in the last 72 hours. U/A:No results for input(s): NITRITE, COLORU, PHUR, LABCAST, WBCUA, RBCUA, MUCUS, TRICHOMONAS, YEAST, BACTERIA, CLARITYU, SPECGRAV, LEUKOCYTESUR, UROBILINOGEN, BILIRUBINUR, BLOODU, GLUCOSEU, AMORPHOUS in the last 72 hours. Invalid input(s): Karime Newington    No orders to display           ASSESSMENT AND PLAN:    1. CAD s/p CABG: Stress test results pending. Given patients ongoing episodes of chest heaviness, abnormal response during his stress test and Hx of CAD we will plan for coronary angiogram likely tomorrow afternoon. LHC  NS 75 mL/hr for 10hrs. NPO @midnight. Continue ASA & Statin   Hold BB due to bradycardia   Tele-monitoring     2. Bradycardia: Became bradycardic during stress test with five second pause. Previous EKGs with sinus bradycardia and first degree AV block with rates in the 40s. Consult EP   Hold BB  tele    3. HFpEF: Echo 1/13/20   Normal left ventricle size. There is mild concentric left ventricular   hypertrophy. Overall left ventricular systolic function appears normal with   an ejection fraction of 55-60%. No regional wall motion abnormalities are   noted. Diastolic filling parameters suggests normal diastolic function.   Trace mitral and tricuspid regurgitation is present.   Estimated pulmonary artery systolic pressure is 30 mmHg assuming a right   atrial pressure of 3 mmHg.   Biatrial enlargement.     4. HLD:  Continue lipitor       Discussed with Dr. Mike Pinto Status:Full code  FEN: Cardiac diet, NPO @midnight  PPX: Lovenox   DISPO: Damari Hartmann MD  8/18/2020,  4:50 PM

## 2020-08-18 NOTE — PROGRESS NOTES
Pt admitted to room 2354 by stretcher. Alert and oriented. Fall precautions in place. Skin assessment complete. Admission complete.

## 2020-08-18 NOTE — PROGRESS NOTES
4 Eyes Admission Assessment     I agree as the admission nurse that 2 RN's have performed a thorough Head to Toe Skin Assessment on the patient. ALL assessment sites listed below have been assessed on admission. Areas assessed by both nurses:   [x]   Head, Face, and Ears   [x]   Shoulders, Back, and Chest  [x]   Arms, Elbows, and Hands   [x]   Coccyx, Sacrum, and Ischum  [x]   Legs, Feet, and Heels        Does the Patient have Skin Breakdown?   Yes a wound was noted on the Admission Assessment and an LDA was Initiated documentation include the Usha-wound, Wound Assessment, Measurements, Dressing Treatment, Drainage, and Color\",  Stage 2 coccyx, stage 1 R shin, abrasion RFA, scattered bruising        Terrance Prevention initiated:  Yes   Wound Care Orders initiated:  Yes      [id number]  nurse consulted for Pressure Injury (Stage 3,4, Unstageable, DTI, NWPT, and Complex wounds) or Terrance score 18 or lower:  Yes      Nurse 1 eSignature: Electronically signed by Ronn Ayala RN on 8/18/20 at 5:36 PM EDT    **SHARE this note so that the co-signing nurse is able to place an eSignature**    Nurse 2 eSignature: Electronically signed by Rayna Yan RN on 8/18/20 at 5:57 PM EDT

## 2020-08-19 ENCOUNTER — APPOINTMENT (OUTPATIENT)
Dept: CARDIAC CATH/INVASIVE PROCEDURES | Age: 80
DRG: 287 | End: 2020-08-19
Attending: INTERNAL MEDICINE
Payer: MEDICARE

## 2020-08-19 LAB
ANION GAP SERPL CALCULATED.3IONS-SCNC: 10 MMOL/L (ref 3–16)
BASOPHILS ABSOLUTE: 0.1 K/UL (ref 0–0.2)
BASOPHILS RELATIVE PERCENT: 1.2 %
BUN BLDV-MCNC: 39 MG/DL (ref 7–20)
CALCIUM SERPL-MCNC: 8.6 MG/DL (ref 8.3–10.6)
CHLORIDE BLD-SCNC: 106 MMOL/L (ref 99–110)
CO2: 23 MMOL/L (ref 21–32)
CREAT SERPL-MCNC: 1.3 MG/DL (ref 0.8–1.3)
EKG ATRIAL RATE: 50 BPM
EKG DIAGNOSIS: NORMAL
EKG P AXIS: 31 DEGREES
EKG P-R INTERVAL: 242 MS
EKG Q-T INTERVAL: 472 MS
EKG QRS DURATION: 88 MS
EKG QTC CALCULATION (BAZETT): 430 MS
EKG R AXIS: -22 DEGREES
EKG T AXIS: 62 DEGREES
EKG VENTRICULAR RATE: 50 BPM
EOSINOPHILS ABSOLUTE: 0.4 K/UL (ref 0–0.6)
EOSINOPHILS RELATIVE PERCENT: 4.3 %
GFR AFRICAN AMERICAN: >60
GFR NON-AFRICAN AMERICAN: 53
GLUCOSE BLD-MCNC: 107 MG/DL (ref 70–99)
HCT VFR BLD CALC: 35.9 % (ref 40.5–52.5)
HEMOGLOBIN: 12.1 G/DL (ref 13.5–17.5)
LEFT VENTRICULAR EJECTION FRACTION HIGH VALUE: 60 %
LEFT VENTRICULAR EJECTION FRACTION MODE: NORMAL
LV EF: 55 %
LYMPHOCYTES ABSOLUTE: 1.6 K/UL (ref 1–5.1)
LYMPHOCYTES RELATIVE PERCENT: 18.8 %
MCH RBC QN AUTO: 31.9 PG (ref 26–34)
MCHC RBC AUTO-ENTMCNC: 33.7 G/DL (ref 31–36)
MCV RBC AUTO: 94.6 FL (ref 80–100)
MONOCYTES ABSOLUTE: 0.6 K/UL (ref 0–1.3)
MONOCYTES RELATIVE PERCENT: 7 %
NEUTROPHILS ABSOLUTE: 5.7 K/UL (ref 1.7–7.7)
NEUTROPHILS RELATIVE PERCENT: 68.7 %
PDW BLD-RTO: 14.4 % (ref 12.4–15.4)
PLATELET # BLD: 160 K/UL (ref 135–450)
PMV BLD AUTO: 9.4 FL (ref 5–10.5)
POTASSIUM REFLEX MAGNESIUM: 4.5 MMOL/L (ref 3.5–5.1)
RBC # BLD: 3.8 M/UL (ref 4.2–5.9)
SODIUM BLD-SCNC: 139 MMOL/L (ref 136–145)
WBC # BLD: 8.3 K/UL (ref 4–11)

## 2020-08-19 PROCEDURE — 93458 L HRT ARTERY/VENTRICLE ANGIO: CPT

## 2020-08-19 PROCEDURE — 93567 NJX CAR CTH SPRVLV AORTGRPHY: CPT

## 2020-08-19 PROCEDURE — 99152 MOD SED SAME PHYS/QHP 5/>YRS: CPT

## 2020-08-19 PROCEDURE — 2580000003 HC RX 258: Performed by: INTERNAL MEDICINE

## 2020-08-19 PROCEDURE — C1894 INTRO/SHEATH, NON-LASER: HCPCS

## 2020-08-19 PROCEDURE — 6360000002 HC RX W HCPCS: Performed by: STUDENT IN AN ORGANIZED HEALTH CARE EDUCATION/TRAINING PROGRAM

## 2020-08-19 PROCEDURE — 6360000004 HC RX CONTRAST MEDICATION: Performed by: INTERNAL MEDICINE

## 2020-08-19 PROCEDURE — 99153 MOD SED SAME PHYS/QHP EA: CPT

## 2020-08-19 PROCEDURE — 93010 ELECTROCARDIOGRAM REPORT: CPT | Performed by: INTERNAL MEDICINE

## 2020-08-19 PROCEDURE — 85025 COMPLETE CBC W/AUTO DIFF WBC: CPT

## 2020-08-19 PROCEDURE — 99024 POSTOP FOLLOW-UP VISIT: CPT | Performed by: INTERNAL MEDICINE

## 2020-08-19 PROCEDURE — 6360000002 HC RX W HCPCS

## 2020-08-19 PROCEDURE — 1200000000 HC SEMI PRIVATE

## 2020-08-19 PROCEDURE — 6370000000 HC RX 637 (ALT 250 FOR IP): Performed by: STUDENT IN AN ORGANIZED HEALTH CARE EDUCATION/TRAINING PROGRAM

## 2020-08-19 PROCEDURE — 93459 L HRT ART/GRFT ANGIO: CPT | Performed by: INTERNAL MEDICINE

## 2020-08-19 PROCEDURE — B2111ZZ FLUOROSCOPY OF MULTIPLE CORONARY ARTERIES USING LOW OSMOLAR CONTRAST: ICD-10-PCS | Performed by: INTERNAL MEDICINE

## 2020-08-19 PROCEDURE — 2709999900 HC NON-CHARGEABLE SUPPLY

## 2020-08-19 PROCEDURE — B41F1ZZ FLUOROSCOPY OF RIGHT LOWER EXTREMITY ARTERIES USING LOW OSMOLAR CONTRAST: ICD-10-PCS | Performed by: INTERNAL MEDICINE

## 2020-08-19 PROCEDURE — 2580000003 HC RX 258: Performed by: STUDENT IN AN ORGANIZED HEALTH CARE EDUCATION/TRAINING PROGRAM

## 2020-08-19 PROCEDURE — 36415 COLL VENOUS BLD VENIPUNCTURE: CPT

## 2020-08-19 PROCEDURE — C1769 GUIDE WIRE: HCPCS

## 2020-08-19 PROCEDURE — B2151ZZ FLUOROSCOPY OF LEFT HEART USING LOW OSMOLAR CONTRAST: ICD-10-PCS | Performed by: INTERNAL MEDICINE

## 2020-08-19 PROCEDURE — 99223 1ST HOSP IP/OBS HIGH 75: CPT | Performed by: INTERNAL MEDICINE

## 2020-08-19 PROCEDURE — 4A023N7 MEASUREMENT OF CARDIAC SAMPLING AND PRESSURE, LEFT HEART, PERCUTANEOUS APPROACH: ICD-10-PCS | Performed by: INTERNAL MEDICINE

## 2020-08-19 PROCEDURE — 80048 BASIC METABOLIC PNL TOTAL CA: CPT

## 2020-08-19 RX ORDER — SODIUM CHLORIDE 9 MG/ML
INJECTION, SOLUTION INTRAVENOUS CONTINUOUS
Status: ACTIVE | OUTPATIENT
Start: 2020-08-19 | End: 2020-08-19

## 2020-08-19 RX ORDER — ACETAMINOPHEN 325 MG/1
650 TABLET ORAL EVERY 4 HOURS PRN
Status: DISCONTINUED | OUTPATIENT
Start: 2020-08-19 | End: 2020-08-20 | Stop reason: HOSPADM

## 2020-08-19 RX ADMIN — TAMSULOSIN HYDROCHLORIDE 0.4 MG: 0.4 CAPSULE ORAL at 08:27

## 2020-08-19 RX ADMIN — SODIUM CHLORIDE: 9 INJECTION, SOLUTION INTRAVENOUS at 15:03

## 2020-08-19 RX ADMIN — Medication 10 ML: at 08:28

## 2020-08-19 RX ADMIN — ATORVASTATIN CALCIUM 80 MG: 40 TABLET, FILM COATED ORAL at 20:06

## 2020-08-19 RX ADMIN — Medication 10 ML: at 20:08

## 2020-08-19 RX ADMIN — PANTOPRAZOLE SODIUM 40 MG: 40 TABLET, DELAYED RELEASE ORAL at 05:01

## 2020-08-19 RX ADMIN — ASPIRIN 81 MG: 81 TABLET, CHEWABLE ORAL at 08:27

## 2020-08-19 RX ADMIN — IOHEXOL 150 ML: 350 INJECTION, SOLUTION INTRAVENOUS at 13:55

## 2020-08-19 RX ADMIN — ENOXAPARIN SODIUM 40 MG: 40 INJECTION SUBCUTANEOUS at 08:27

## 2020-08-19 ASSESSMENT — PAIN DESCRIPTION - PAIN TYPE: TYPE: ACUTE PAIN

## 2020-08-19 ASSESSMENT — PAIN SCALES - GENERAL: PAINLEVEL_OUTOF10: 3

## 2020-08-19 NOTE — PROCEDURES
CARDIOLOGY CATH LAB NOTE  [facility]       Left heart catheterization with coronary angiography    Cindy Chavez  67 y.o.  [id number]  8/19/2020, 4:25 PM  Referring MD : INES PAREDES  Procedure Coronary Angiogram   Left heart cath  Saphenous vein graft injection  DEANNA graft injection  Aortic root injection  Selective coronary angiogram  Left ventriculogram  Right ileofemoral angiogram  Closure device (Angioseal) was not performed due to our insertion site into the superficial femoral artery. Indication: Cardiac cath to rule out ischemic CAD, Possible angioplasty, The procedure and risks described to patient including risk of CVA, MI, bleeding, emergency surgery, death, patient was seen in the echo stress lab yesterday at which time he had significant sinus pauses during the stress test and had discomfort pressure and heaviness and known CAD. Admitted for evaluation with electrophysiology concerning for possible pacemaker need and for angiography. The stress nuclear study was abnormal and positive., Consent signed or positive stress test  Anesthesia: Moderate sedation with Versed and Fentanyl IV  Estimated blood loss :minimal        After informed consent was obtained and  History and exam reviewed the patient was taken to the cardiac cath lab. The patient had the right groin prepped and draped in sterile fashion. The groin was anesthetized with 2% Xylocaine. Using a thin walled needle the right femoral artery was entered and and .035mm guide wire was inserted. A 5 [place] arterial introducer was advanced through the needle and the wire was removed. The sheath was aspirated and flushed with normal saline. A 5 left Debra catheter was advanced through the sheath over a guide wire and advanced under fluoroscopic guidance into the ascending aorta. The wire was withdrawn and the catheter was aspirated and flushed with normal saline.  The catheter was then advanced into the ostium of the left coronary artery under fluoroscopic guidance. Multiple cine images were obtained in different projections of the left coronary artery. The catheter was then withdrawn. A JR4 catheter was subsequently advanced  Through the sheath over a wire and advanced under fluoroscopic guidance in to the ascending aorta. The guide wire was removed and the catheter was aspirated and flushed and subsequently advanced in to the ostium of the right coronary artery. Multiple cine images were then obtained of the right coronary artery. The catheter was then withdrawn. A pigtail catheter was then advanced through the sheath over a guide wire and advanced in to the ascending aorta. The wire was then used to help prolapse the catheter across the aortic valve. Once the catheter was across the valve the wire was withdrawn and the catheter was aspirated and flushed with normal saline. Pressure measurements of the left ventricle were then obtained. Then catheter was then connected to a mechanical injection device for contrast ventriculography that was performed in an AGUILAR projection. The catheter was then reconnected to a pressure system for a pullback pressure analysis of the aortic valve. We did perform an aortic root injection in the L AO position to look for additional vein graft that may or may not have been present. The catheter had a guide wire placed in it and the catheter was then withdrawn. We used a JR4 diagnostic catheter to inject the left internal mammary. Various degrees of obliquity. We used a JR4 catheter to find the saphenous vein graft which appears to be a quadruple skip graft.   The right ileofemoral sheath was injected and the vessel imaged    Hemostasis was obtained by Angioseal/ manual pressure      Complications: None      Estimated blood loss: Minimal      Sedation: Fentanyl 25 micrograms                  Versed 0.5 mg    Fluoro time 11.6 minutes  Contrast 170 cc  Radiation exposure 592.79 mGy   Angiographic Findings:  Left Main: Normal    Left Anterior Descending: Has mid vessel plaque and areas of stenosis up to 60%. Circumflex: Has subtotal occlusion in the midportion. There is a vein graft to the distal circumflex vessels. Right Coronary: Is probably nondominant. Mid distal stenosis of 60 to 70%. Left Ventriculogram: Normal contractility and size with ejection fraction of 55 to 60%. EDP was 12    Right ileofemoral: Normal vessel. Our insertion site was at the superficial femoral therefore no closure device was added. Saphenous vein graft on the right was injected. It shows what appears to be quadruple skip graft. Is a large vessel with good flow to all the areas that it was attached to. We see distal right and we see distal circumflex and obtuse marginal x2    The DEANNA is a small atretic looking vessel. The anastomosis at the LAD with very limited and slow flow down the LAD. We do not see any opportunities for intervention. Hemodynamics:   Left Ventricular Pressure: 12  Central Aortic Pressure: 100    Assessment:  Patient with 5 vessel bypass in 4 of the grafts are in really great shape. The DEANNA is atretic into the LAD. We do not see any opportunities for intervention on this patient. The LV function is good with normal ejection fraction of 55 to 60%. Recommendations: We will continue his current therapy including anti-ischemic therapy. Optimize lipid management with LDL target of 60 or less. Blood pressure looks really good on current therapy. He is being evaluated by EP because of the bradycardia and pauses occurring in the stress lab which may have been related to the [place]. I do not suspect that a pacemaker is necessary at this time as he has had good rhythm throughout the night. Probably outpatient monitoring would be appropriate for this patient. We will await results and recommendation from electrophysiology.   Massimo Hassan MD, [facility] [place]      This

## 2020-08-19 NOTE — PLAN OF CARE
Problem: Skin Integrity:  Goal: Will show no infection signs and symptoms  Description: Will show no infection signs and symptoms  Outcome: Ongoing  Note: Pt at risk for skin breakdown. Patient has multiple wounds, putting zinc on bottom. Skin assessment as documented. Pts skin cleansed with inc cleanser as needed. Pt repositioned in bed with pillow support as needed. Call light within reach. Will continue to monitor. Problem: Skin Integrity:  Goal: Absence of new skin breakdown  Description: Absence of new skin breakdown  Outcome: Ongoing     Problem: Falls - Risk of:  Goal: Will remain free from falls  Description: Will remain free from falls  Outcome: Ongoing  Note: Patient is a fall risk. Patient is wheelchair bound. See Fall Risk assessment for details. Bed is in low, lock position; call light/belongings within reach. No attempts to get out of bed have been made, calls appropriately when assistance is needed. Bed alarm and hourly rounds in place for safety. Will continue to monitor and reassess throughout shift.         Problem: Falls - Risk of:  Goal: Absence of physical injury  Description: Absence of physical injury  Outcome: Ongoing

## 2020-08-19 NOTE — PROGRESS NOTES
[facility]  Cardiology Inpatient   Daily Progress Note        Admit Date:  8/18/2020    Referring Physician: Aliya Cornejo MD      Subjective: Interval history:  Patient was seen and examined at bedside. No events overnight. Patient denies any chest pain, palpitations, SOB, abdominal pain or lower extremity edema. Medications:   aspirin  81 mg Oral Daily    atorvastatin  80 mg Oral Nightly    pantoprazole  40 mg Oral QAM AC    tamsulosin  0.4 mg Oral Daily    sodium chloride flush  10 mL Intravenous 2 times per day    enoxaparin  40 mg Subcutaneous Daily       IV drips:      PRN:  acetaminophen **OR** acetaminophen, polyethylene glycol, promethazine **OR** ondansetron, potassium chloride, magnesium sulfate, melatonin, zolpidem      Objective:     Vitals:    08/18/20 1929 08/18/20 2327 08/19/20 0348 08/19/20 0810   BP: 123/69 101/69 (!) 98/54 113/68   Pulse: 54 76 58 59   Resp: 18 18 18 16   Temp: 96.8 °F (36 °C) 97.9 °F (36.6 °C) 98.7 °F (37.1 °C) 97.5 °F (36.4 °C)   TempSrc: Oral Oral Oral Oral   SpO2: 95% 97% 95% 95%   Weight:       Height:           Intake/Output Summary (Last 24 hours) at 8/19/2020 1149  Last data filed at 8/19/2020 0504  Gross per 24 hour   Intake 120 ml   Output 825 ml   Net -705 ml     I/O last 3 completed shifts: In: 120 [P.O.:120]  Out: 825 [Urine:825]  Wt Readings from Last 3 Encounters:   08/18/20 176 lb 9.4 oz (80.1 kg)   06/29/20 202 lb (91.6 kg)   06/11/20 213 lb 10 oz (96.9 kg)       Admit Wt: Weight: 176 lb 9.4 oz (80.1 kg)   Todays Wt: Weight: 176 lb 9.4 oz (80.1 kg)    TELEMETRY: Sinus, sinus bradycardia      Physical Exam:   Physical Exam  Constitutional:       Appearance: Normal appearance. He is normal weight. HENT:      Head: Normocephalic. Nose: Nose normal.      Mouth/Throat:      Mouth: Mucous membranes are moist.   Eyes:      Pupils: Pupils are equal, round, and reactive to light.    Neck:      Musculoskeletal: Normal range of motion. Cardiovascular:      Rate and Rhythm: Normal rate and regular rhythm. Heart sounds: Murmur present. Pulmonary:      Effort: Pulmonary effort is normal. No respiratory distress. Breath sounds: Normal breath sounds. No stridor. No wheezing, rhonchi or rales. Chest:      Chest wall: No tenderness. Abdominal:      General: Abdomen is flat. Musculoskeletal: Normal range of motion. Skin:     General: Skin is warm. Neurological:      General: No focal deficit present. Mental Status: He is alert and oriented to person, place, and time. Psychiatric:         Mood and Affect: Mood normal.           Labs:   Recent Labs     08/18/20  1722      K 4.1   BUN 37*   CREATININE 1.2      CO2 27   GLUCOSE 117*   CALCIUM 9.0     Recent Labs     08/18/20  1722 08/19/20  0455   WBC 10.9 8.3   HGB 13.8 12.1*   HCT 40.6 35.9*    160   MCV 96.2 94.6     No results for input(s): CHOLTOT, TRIG, HDL in the last 72 hours. Invalid input(s): LIPIDCOMM, CHOLHDL, VLDCHOL, LDL  No results for input(s): PTT, INR in the last 72 hours. Invalid input(s): PT  No results for input(s): CKTOTAL, CKMB, CKMBINDEX, TROPONINI in the last 72 hours. No results for input(s): BNP in the last 72 hours. No results for input(s): NTPROBNP in the last 72 hours. No results for input(s): TSH in the last 72 hours. Imaging:   I personally reviewed imaging studies including CXR, Stress test, TTE/MAYA. Assessment & Plan:   1. CAD s/p CABG: Stress test results pending. Given patients ongoing episodes of chest heaviness, abnormal response during his stress test and Hx of CAD we will plan for coronary angiogram today afternoon  [facility]  NPO   Continue ASA & Statin   Hold BB due to bradycardia   Tele-monitoring      2. Bradycardia: Became bradycardic during stress test with five second pause. Previous EKGs with sinus bradycardia and first degree AV block with rates in the 40s.   Consult EP   Hold

## 2020-08-19 NOTE — PROGRESS NOTES
NUTRITION ASSESSMENT  Admission Date: 8/18/2020     Type and Reason for Visit: Initial, Positive Nutrition Screen    NUTRITION RECOMMENDATIONS:   1. PO Diet: NPO d/t testing    2. ONS: Start ONS w/diet   3. Obtain a new, actual wt    NUTRITION ASSESSMENT:  Pt is at risk for nutritional compromise AEB wt loss, wounds and decreased appetite. Pt is currently NPO for angiogram today. Pt reports that he has not been eating the best d/t GI distress. Pt w/chronic constipation. Provided pt w/consitpation nutrition therapy and fiber list for foods. Pt states that he weighed himself 2 wks ago and he was 298#, questioning current wt status. Recommend obtaining another weight to compare current weight in EMR. Pt voices that he drinks muscle milk/premier protein drinks at home. Will add ONS w/diet advancement and monitor nutritional status this admission. Due to current CDC guidelines recommending 6-ft distancing for social isolation for COVID19 prevention, physical aspects of the malnutrition assessment were withheld at this time. MALNUTRITION ASSESSMENT  Context of Malnutrition: Acute Illness   Malnutrition Status: Insufficient data  Findings of the 6 clinical characteristics of malnutrition (Minimum of 2 out of 6 clinical characteristics is required to make the diagnosis of moderate or severe Protein Calorie Malnutrition based on AND/ASPEN Guidelines):  Energy Intake: Less than/equal to 75% of estimated energy requirements    Energy Intake Time: Greater than or equal to 1 month    Weight Loss %: Unable to assess    Weight loss Time: Unable to assess   Body Fat Loss: Unable to Assess   Body Fat Location: Unable to assess    Body Muscle Loss: Unable to Assess   Body Muscle Loss Location: Unable to assess    Fluid Accumulation: No significant    Fluid Accumulation Location: No significant     Strength: Not Performed;  Not Measured     NUTRITION DIAGNOSIS   Problem: Problem #1: Inadequate oral intake  Etiology: Insufficient energy/nutrient consumption  Signs & Symptoms: Constipation, Diarrhea, Diet history of poor intake  and GI abnormality     NUTRITION INTERVENTION  Food and/or Nutrient Delivery:Continue NPO (start ONS w/diet)  Nutrition education/counseling/coordination of care: Continue Inpatient Monitoring     NUTRITION MONITORING & EVALUATION:  Evaluation:Goals set   Goals:Goals: Pt will consume >/=50% of meals and ONS this admission.    Monitoring: GI Function , Meal Intake , Nutrition Progression , Supplement Intake  or Weight      OBJECTIVE DATA:  · Nutrition-Focused Physical Findings: no edema, LBM 8/18  · Wounds Stage I and Stage II     Past Medical History:   Diagnosis Date    BPH (benign prostatic hyperplasia)     Carotid stenosis 12/2013    JESU 56-72% stenosis; LICA 80-46% stenosis    Cellulitis 12/2013    LLE    Chronic back pain     Diverticular disease     Erectile dysfunction     GERD (gastroesophageal reflux disease)     History of atrial fibrillation     Hypercholesteremia     Hypertension     Lower GI bleed     MDRO (multiple drug resistant organisms) resistance 10/26/2019    urine    Neuromuscular disorder (HCC)     spasticity    Renal insufficiency     Risk for falls     uses walker    Tinea corporis 12/2013    LLE    Vitamin B12 deficiency         ANTHROPOMETRICS  Current Height: 5' 11.5\" (181.6 cm)  Current Weight: 176 lb 9.4 oz (80.1 kg)    Admission weight: 176 lb 9.4 oz (80.1 kg)  Ideal Bodyweight 172 lb    Weight Changes requesting another wt to better assess       BMI BMI (Calculated): 24.3    Wt Readings from Last 50 Encounters:   08/18/20 176 lb 9.4 oz (80.1 kg)   06/29/20 202 lb (91.6 kg)   06/11/20 213 lb 10 oz (96.9 kg)   05/04/20 218 lb 14.7 oz (99.3 kg)   03/20/20 207 lb (93.9 kg)   02/10/20 207 lb 3.2 oz (94 kg)   01/20/20 177 lb 4 oz (80.4 kg)   12/17/19 188 lb 7.9 oz (85.5 kg)   11/19/19 182 lb 8 oz (82.8 kg)   10/26/19 182 lb 8 oz (82.8 kg)   09/13/19 225 lb (102.1

## 2020-08-19 NOTE — CONSULTS
Past Surgical History:   Procedure Laterality Date    BACK SURGERY  2006    lower lumbar    CORONARY ARTERY BYPASS GRAFT  12/13/2013    CABG x 5 (Dr Ashanti Jones), svg to diag, om1 and 3, distal rca, kelly to lad.  CYSTOSCOPY N/A 1/10/2019    CYSTOSCOPY performed by Tashia Plummer MD at [facility] [address] 5/1/2015    ORIF    SIGMOIDOSCOPY N/A 6/11/2020    SIGMOIDOSCOPY DIAGNOSTIC FLEXIBLE performed by Anup Kiser MD at [facility] 5/4/2020    EGD BIOPSY performed by Anup Kiser MD at [facility] ENDOSCOPY       Allergies   Allergen Reactions    Bactrim [Sulfamethoxazole-Trimethoprim] Rash       Social History:  Reviewed. reports that he quit smoking about 50 years ago. He has never used smokeless tobacco. He reports that he does not drink alcohol or use drugs. Family History:  Reviewed. family history includes Heart Disease in his father. No premature CAD. Review of System:  All other systems reviewed except for that noted above. Pertinent negatives and positives are:     Objective      · General: negative for fever, chills   · Ophthalmic ROS: negative for - eye pain or loss of vision  · ENT ROS: negative for - headaches, sore throat   · Respiratory: negative for - cough, sputum  · Cardiovascular: Reviewed in HPI  · Gastrointestinal: negative for - abdominal pain, diarrhea, N/V  · Hematology: negative for - bleeding, blood clots, bruising or jaundice  · Genito-Urinary:  negative for - Dysuria or incontinence  · Musculoskeletal: negative for - Joint swelling, muscle pain  · Neurological: negative for - confusion, dizziness, headaches   · Psychiatric: No anxiety, no depression.   · Dermatological: negative for - rash    Physical Examination:  Vitals:    08/19/20 1514   BP: (!) 84/44   Pulse: 57   Resp: 16   Temp: 97.7 °F (36.5 °C)   SpO2: 92%        Intake/Output Summary (Last 24 hours) at 8/19/2020 1515  Last data 1.04 01/01/2019    INR 1.19 04/28/2017     Lab Results   Component Value Date    CHOL 108 08/14/2020    HDL 32 08/14/2020    TRIG 64 08/14/2020       Diagnostic and imaging results reviewed. ECG: Sinus rhythm  Echo: Normal LV ejection fraction    I independently reviewed the ECG and telemetry.      Scheduled Meds:   aspirin  81 mg Oral Daily    atorvastatin  80 mg Oral Nightly    pantoprazole  40 mg Oral QAM AC    tamsulosin  0.4 mg Oral Daily    sodium chloride flush  10 mL Intravenous 2 times per day    enoxaparin  40 mg Subcutaneous Daily     Continuous Infusions:   sodium chloride 75 mL/hr at 08/19/20 1503     PRN Meds:.acetaminophen, [DISCONTINUED] acetaminophen **OR** acetaminophen, polyethylene glycol, promethazine **OR** ondansetron, potassium chloride, magnesium sulfate, melatonin, zolpidem     Assessment:   Patient Active Problem List    Diagnosis Date Noted    Abnormal stress test 08/18/2020    Constipation 06/09/2020    Encopresis with constipation and overflow incontinence 06/09/2020    Cellulitis of scrotum 05/02/2020    Acute renal injury ([address] 03/17/2020    Pseudomonas infection 02/06/2020    Dyspnea     Bradycardia     CHF with unknown LVEF ([address] 01/13/2020    Gross hematuria 12/17/2019    Chronic indwelling Iglesias catheter 11/20/2019    Hyperlipidemia 11/20/2019    Bilateral lower leg cellulitis 11/20/2019    Neurogenic bladder 11/20/2019    Bacteriuria 11/20/2019    Abnormality of urethral meatus 11/20/2019    Acute encephalopathy 10/08/2019    Problem with urinary catheter ([address] 10/07/2019    Edema 61/87/5468    Complicated UTI (urinary tract infection) 01/07/2019    ALIS (acute kidney injury) ([address] 01/07/2019    Hypothermia 01/03/2019    Acute low back pain without sciatica 05/26/2017    Hip fracture, right ([address] 05/01/2015    Acute right hip pain     Lower GI bleeding 11/10/2014    CAD (coronary artery disease) 01/27/2014    S/P CABG x 5 01/27/2014    Cellulitis 12/16/2013    Essential hypertension 12/16/2013    GERD (gastroesophageal reflux disease) 12/16/2013    Acute blood loss anemia 12/16/2013    Tinea corporis 12/16/2013    Carotid stenosis 12/16/2013    Hypotension 11/30/2013    Light headed 11/30/2013      Active Hospital Problems    Diagnosis Date Noted    Abnormal stress test [R94.39] 08/18/2020         Recommendation (s):    1. Atypical chest discomfort  2. Abnormal stress test  3. CAD, status post CABG  4. Possible 5 seconds during stress test      Even though Regadenoson have lower affinity to A1 and A3 receptors, and higher affinity to A2 receptors, he is specifically manifested symptoms of blockade of A1 and A3 receptors as documented by pause and shortness of breath during this episode. He received a total of 150 mg of aminophylline and eventually patient got better. He does have a baseline sinus bradycardia and first-degree AV block. In the telemetry, I do not see any high-grade sinus/AV block. Hence, there is no urgent indication for pacemaker. Having said that, he clearly have first-degree AV block and a sinus bradycardia with a heart rate of 45 to 50 bpm.  After this event, his beta-blocker is on hold secondary to concerns of heart block. I would place him at 2 days CAM patch monitor to evaluate for any evidence of high degree block. CAD, as per Dr. Afshin Ames, on aspirin and statins  BPH stable, on Flomax    Thank you for allowing me to participate in the care of [address] [place] [address] . If you have any questions/comments, please do not hesitate to contact us. Larry Washburn MD   Cardiac Electrophysiology  [facility]    For any EP related issues after 5 PM, contact Nicole Whaley on call cardiology through .

## 2020-08-19 NOTE — PLAN OF CARE
Problem: Skin Integrity:  Goal: Will show no infection signs and symptoms  Description: Will show no infection signs and symptoms  8/19/2020 1105 by Carmencita Zeng RN  Outcome: Ongoing  Note: Pt has multiple areas of skin breakdown. See flowsheets for documentation on areas of breakdown. Pt is on a specialty mattress. Assist with repositioning. Iglesias in place. Pt is incontinent of stool. Wound care consulted. Will monitor. Problem: Falls - Risk of:  Goal: Will remain free from falls  Description: Will remain free from falls  8/19/2020 1105 by Carmencita Zeng RN  Outcome: Ongoing  Note: Patient at risk for falls. Patient resting quietly in bed. Side rails up x 2/4. Bed locked in lowest position. Bed alarm on. Bedside table and call light within reach. Patient instructed to call for assistance. Patient verbalized understanding. Will continue to monitor. Problem: Tissue Perfusion - Cardiopulmonary, Altered:  Goal: Hemodynamic stability will improve  Description: Hemodynamic stability will improve  Note: Pt had abnormal stress test yesterday. Pt is to have C today. VSS, pt is NSR/SB on telemetry. No c/o chest pain.

## 2020-08-19 NOTE — ANESTHESIA PRE-OP
Brief Pre-Op Note/Sedation Assessment      Cindy Chavez  1940  [id number]      [id number]  2:58 PM    Planned Procedure: Cardiac Catheterization Procedure    Post Procedure Plan: Return to same level of care    Consent: I have discussed with the patient and/or the patient representative the indication, alternatives, and the possible risks and/or complications of the planned procedure and the anesthesia methods. The patient and/or patient representative appear to understand and agree to proceed. Chief Complaint: Chest Pain/Pressure  Dyspnea on Exertion  Dyspnea      Indications for Cath Procedure: Worsening Angina  Anginal Classification within 2 weeks:  CCS II - Slight limitation, with angina only during vigorous physical activity  NYHA Heart Failure Class within 2 weeks: No symptoms  Is Cath Lab Visit Valve-related?: No  Surgical Risk: Intermediate  Functional Type: >= 4 METS without symptoms    Anti- Anginal Meds within 2 weeks:   Yes: Beta Blockers    Stress or Imaging Studies Performed (within 6 months):  Stress Test with SPECT Result: Positive:  inferoseptal and inferolateral Risk/Extent of Ischemia:  Intermediate     Vital Signs:  BP (!) 91/55   Pulse 50   Temp 96.3 °F (35.7 °C) (Oral)   Resp 16   Ht 5' 11.5\" (1.816 m)   Wt 189 lb 9.5 oz (86 kg)   SpO2 96%   BMI 26.07 kg/m²     Allergies:   Allergies   Allergen Reactions    Bactrim [Sulfamethoxazole-Trimethoprim] Rash       Past Medical History:  Past Medical History:   Diagnosis Date    BPH (benign prostatic hyperplasia)     Carotid stenosis 12/2013    JESU 56-40% stenosis; LICA 13-31% stenosis    Cellulitis 12/2013    LLE    Chronic back pain     Diverticular disease     Erectile dysfunction     GERD (gastroesophageal reflux disease)     History of atrial fibrillation     Hypercholesteremia     Hypertension     Lower GI bleed     MDRO (multiple drug resistant organisms) resistance 10/26/2019    urine    Neuromuscular disorder ([facility]     spasticity    Renal insufficiency     Risk for falls     uses walker    Tinea corporis 12/2013    LLE    Vitamin B12 deficiency          Surgical History:  Past Surgical History:   Procedure Laterality Date    BACK SURGERY  2006    lower lumbar    CORONARY ARTERY BYPASS GRAFT  12/13/2013    CABG x 5 (Dr Fady Medina), svg to diag, om1 and 3, distal rca, kelly to lad.      CYSTOSCOPY N/A 1/10/2019    CYSTOSCOPY performed by Ed Smallwood MD at [address] 5/1/2015    ORIF    SIGMOIDOSCOPY N/A 6/11/2020    SIGMOIDOSCOPY DIAGNOSTIC FLEXIBLE performed by Elicia Bumpers, MD at [address] [facility] N/A 5/4/2020    EGD BIOPSY performed by Elicia Bumpers, MD at [facility] ENDOSCOPY         Medications:  Current Facility-Administered Medications   Medication Dose Route Frequency Provider Last Rate Last Dose    aspirin chewable tablet 81 mg  81 mg Oral Daily Otilia Frankel MD   81 mg at 08/19/20 0827    atorvastatin (LIPITOR) tablet 80 mg  80 mg Oral Nightly Otilia Frankel MD   80 mg at 08/18/20 2101    pantoprazole (PROTONIX) tablet 40 mg  40 mg Oral QAM AC Otilia Frankel MD   40 mg at 08/19/20 0501    tamsulosin (FLOMAX) capsule 0.4 mg  0.4 mg Oral Daily Otilia Frankel MD   0.4 mg at 08/19/20 0827    sodium chloride flush 0.9 % injection 10 mL  10 mL Intravenous 2 times per day Otilia Frankel MD   10 mL at 08/19/20 0828    acetaminophen (TYLENOL) tablet 650 mg  650 mg Oral Q6H PRN Otilia Frankel MD        Or    acetaminophen (TYLENOL) suppository 650 mg  650 mg Rectal Q6H PRN Otilia Frankel MD        polyethylene glycol [facility]) packet 17 g  17 g Oral Daily PRN Otilia Frankel MD        promethazine (PHENERGAN) tablet 12.5 mg  12.5 mg Oral Q6H PRN Otilia Frankel MD        Or    [facility]ron [facility]) injection 4 mg  4 mg Intravenous Q6H PRN Otilia Frankel MD        enoxaparin (LOVENOX) injection 40 mg  40 mg Subcutaneous Daily Baljit Newman MD   40 mg at 08/19/20 0827    potassium chloride 10 mEq/100 mL IVPB (Peripheral Line)  10 mEq Intravenous PRN Baljit Newman MD        magnesium sulfate 2 g in 50 mL IVPB premix  2 g Intravenous PRN Baljit Newman MD        melatonin tablet 10 mg  10 mg Oral Nightly PRN Dianne Armijo MD   10 mg at 08/18/20 2218    zolpidem (AMBIEN) tablet 10 mg  10 mg Oral Nightly PRN Dianne Armijo MD   10 mg at 08/18/20 2218           Pre-Sedation:    Pre-Sedation Documentation and Exam:  I have personally completed a history, physical exam & review of systems for this patient (see notes). Prior History of Anesthesia Complications:   none    Modified Mallampati:  I (soft palate, uvula, fauces, tonsillar pillars visible)    ASA Classification:  Class 3 - A patient with severe systemic disease that limits activity but is not incapacitating      Twila Scale: Activity:  2 - Able to move 4 extremities voluntarily on command  Respiration:  2 - Able to breathe deeply and cough freely  Circulation:  2 - BP+/- 20mmHg of normal  Consciousness:  2 - Fully awake  Oxygen Saturation (color):  2 - Able to maintain oxygen saturation >92% on room air    Sedation/Anesthesia Plan:  Guard the patient's safety and welfare. Minimize physical discomfort and pain. Minimize negative psychological responses to treatment by providing sedation and analgesia and maximize the potential amnesia. Patient to meet pre-procedure discharge plan.     Medication Planned:  Versed and fentanyl    Patient is an appropriate candidate for plan of sedation: yes      Electronically signed by Dianne Armijo MD on 8/19/2020 at 2:58 PM

## 2020-08-19 NOTE — PLAN OF CARE
Nutrition Problem #1: Inadequate oral intake  Intervention: Food and/or Nutrient Delivery: Continue NPO(start ONS s/p angiogram)  Nutritional Goals: Pt will consume >/=50% of meals and ONS this admission.

## 2020-08-19 NOTE — PROGRESS NOTES
Pt. With intact skin and hemosiderin staining to buttocks only. Zinc cream applied after cleansin incontinent stool.

## 2020-08-20 ENCOUNTER — TELEPHONE (OUTPATIENT)
Dept: CARDIOLOGY CLINIC | Age: 80
End: 2020-08-20

## 2020-08-20 VITALS
OXYGEN SATURATION: 96 % | WEIGHT: 189.6 LBS | BODY MASS INDEX: 25.68 KG/M2 | DIASTOLIC BLOOD PRESSURE: 65 MMHG | HEIGHT: 72 IN | HEART RATE: 74 BPM | TEMPERATURE: 98.5 F | RESPIRATION RATE: 18 BRPM | SYSTOLIC BLOOD PRESSURE: 139 MMHG

## 2020-08-20 PROBLEM — Z92.89 H/O ANGIOGRAPHY: Status: ACTIVE | Noted: 2020-08-20

## 2020-08-20 LAB
BASOPHILS ABSOLUTE: 0.1 K/UL (ref 0–0.2)
BASOPHILS RELATIVE PERCENT: 0.6 %
EOSINOPHILS ABSOLUTE: 0.4 K/UL (ref 0–0.6)
EOSINOPHILS RELATIVE PERCENT: 4.8 %
HCT VFR BLD CALC: 33.3 % (ref 40.5–52.5)
HEMOGLOBIN: 11.3 G/DL (ref 13.5–17.5)
LYMPHOCYTES ABSOLUTE: 1.5 K/UL (ref 1–5.1)
LYMPHOCYTES RELATIVE PERCENT: 17.8 %
MCH RBC QN AUTO: 32.2 PG (ref 26–34)
MCHC RBC AUTO-ENTMCNC: 34 G/DL (ref 31–36)
MCV RBC AUTO: 94.7 FL (ref 80–100)
MONOCYTES ABSOLUTE: 0.5 K/UL (ref 0–1.3)
MONOCYTES RELATIVE PERCENT: 6 %
NEUTROPHILS ABSOLUTE: 6.1 K/UL (ref 1.7–7.7)
NEUTROPHILS RELATIVE PERCENT: 70.8 %
PDW BLD-RTO: 14.5 % (ref 12.4–15.4)
PLATELET # BLD: 135 K/UL (ref 135–450)
PMV BLD AUTO: 9.8 FL (ref 5–10.5)
RBC # BLD: 3.52 M/UL (ref 4.2–5.9)
WBC # BLD: 8.7 K/UL (ref 4–11)

## 2020-08-20 PROCEDURE — 99239 HOSP IP/OBS DSCHRG MGMT >30: CPT | Performed by: NURSE PRACTITIONER

## 2020-08-20 PROCEDURE — 99233 SBSQ HOSP IP/OBS HIGH 50: CPT | Performed by: INTERNAL MEDICINE

## 2020-08-20 PROCEDURE — 6360000002 HC RX W HCPCS: Performed by: STUDENT IN AN ORGANIZED HEALTH CARE EDUCATION/TRAINING PROGRAM

## 2020-08-20 PROCEDURE — 85025 COMPLETE CBC W/AUTO DIFF WBC: CPT

## 2020-08-20 PROCEDURE — 6370000000 HC RX 637 (ALT 250 FOR IP): Performed by: STUDENT IN AN ORGANIZED HEALTH CARE EDUCATION/TRAINING PROGRAM

## 2020-08-20 PROCEDURE — 0296T PR EXT ECG > 48HR TO 21 DAY RCRD W/CONECT INTL RCRD: CPT | Performed by: INTERNAL MEDICINE

## 2020-08-20 PROCEDURE — 2580000003 HC RX 258: Performed by: STUDENT IN AN ORGANIZED HEALTH CARE EDUCATION/TRAINING PROGRAM

## 2020-08-20 PROCEDURE — 6370000000 HC RX 637 (ALT 250 FOR IP): Performed by: INTERNAL MEDICINE

## 2020-08-20 RX ADMIN — PANTOPRAZOLE SODIUM 40 MG: 40 TABLET, DELAYED RELEASE ORAL at 05:11

## 2020-08-20 RX ADMIN — ASPIRIN 81 MG: 81 TABLET, CHEWABLE ORAL at 09:31

## 2020-08-20 RX ADMIN — ZOLPIDEM TARTRATE 10 MG: 5 TABLET ORAL at 00:04

## 2020-08-20 RX ADMIN — ENOXAPARIN SODIUM 40 MG: 40 INJECTION SUBCUTANEOUS at 09:31

## 2020-08-20 RX ADMIN — Medication 10 ML: at 09:32

## 2020-08-20 RX ADMIN — Medication 10 MG: at 00:04

## 2020-08-20 RX ADMIN — TAMSULOSIN HYDROCHLORIDE 0.4 MG: 0.4 CAPSULE ORAL at 09:31

## 2020-08-20 NOTE — DISCHARGE SUMMARY
Patient ID:  Dane Signs  [id number]  76 y.o.  1940    Admit date: 8/18/2020    Discharge date and time: 8/20/2020    Admitting Physician: Shanti Aquino MD       Admission Diagnoses: Abnormal stress test [R94.39]    Discharge Diagnoses: SAME    Admission Condition: fair    Discharged Condition: good    Hospital Course:     Assessment    abnormal stress test with cp   CABG 2013   Negative Stress Test 2015    HLD: on Lipitor    [facility] 8/19/20   Patient with 5 vessel bypass in 4 of the grafts are in really great shape. The DEANNA is atretic into the LAD. We do not see any opportunities for intervention on this patient. The LV function is good with normal ejection fraction of 55 to 60%. Recommendations: We will continue his current therapy including anti-ischemic therapy. Optimize lipid management with LDL target of 60 or less. Blood pressure looks really good on current therapy. He is being evaluated by EP because of the bradycardia and pauses occurring in the stress lab which may have been related to the [employer]. I do not suspect that a pacemaker is necessary at this time as he has had good rhythm throughout the night. Probably outpatient monitoring would be appropriate for this patient. We will await results and recommendation from electrophysiology. You Valle      Echo 1/13/20   Normal left ventricle size. There is mild concentric left ventricular   hypertrophy. Overall left ventricular systolic function appears normal with   an ejection fraction of 55-60%. No regional wall motion abnormalities are   noted. Diastolic filling parameters suggests normal diastolic function.   Trace mitral and tricuspid regurgitation is present.   Estimated pulmonary artery systolic pressure is 30 mmHg assuming a right   atrial pressure of 3 mmHg.   Biatrial enlargement.     Bradycardia  Dr Vernon Carter consulted   Hold BB  Heart monitor to wear when discharged   \"Even though Regadenoson have lower affinity to A1 and A3 receptors, and higher affinity to A2 receptors, he is specifically manifested symptoms of blockade of A1 and A3 receptors as documented by pause and shortness of breath during this episode. He received a total of 150 mg of aminophylline and eventually patient got better. He does have a baseline sinus bradycardia and first-degree AV block. In the telemetry, I do not see any high-grade sinus/AV block. Hence, there is no urgent indication for pacemaker. Having said that, he clearly have first-degree AV block and a sinus bradycardia with a heart rate of 45 to 50 bpm.  After this event, his beta-blocker is on hold secondary to concerns of heart block. I would place him at 2 days CAM patch monitor to evaluate for any evidence of high degree block\".       Plan:  Mr. Kel Gaston c/o cp with abnormal stress test and C completed / no intervention  No c/o cp or SOB today   HR 50s / he will go home with Holter monitor due to bradycardia   hold all neg chronotropics   Groin site no complication from [facility]   discharge home and fu with cardiolgy in 1-2 weeks    Consults: EP       Discharge Exam:  /65   Pulse 52   Temp 97.6 °F (36.4 °C) (Oral)   Resp 18   Ht 5' 11.5\" (1.816 m)   Wt 189 lb 9.5 oz (86 kg)   SpO2 96%   BMI 26.07 kg/m²     General Appearance:    Alert, cooperative, no distress, appears stated age   Head:    Normocephalic, without obvious abnormality, atraumatic   Eyes:    PERRL, conjunctiva/corneas clear, EOM's intact        Ears:    deferred   Nose:   Nares normal, septum midline, mucosa normal, no drainage    or sinus tenderness   Throat:   Lips, mucosa, and tongue normal; teeth and gums normal   Neck:   Supple, symmetrical, trachea midline, no adenopathy;        thyroid:  No enlargement/tenderness/nodules; no carotid    bruit or JVD   Back:     Symmetric, no curvature, ROM normal, no CVA tenderness   Lungs:     Clear to auscultation bilaterally, respirations unlabored   Chest wall:    No tenderness or Reason for Stopping:         niacin 500 MG tablet Comments:   Reason for Stopping:                Activity: as tolerated  Diet: cardiac diet    Follow-up in office in 1-2 weeks   Abad Patterson [address]      Time spent on discharge of patient: >30 minutes, including plan of care, patient education, and care coordination. This patient is awake and alert and is seen with Dr. Julia Weldon my resident. He is doing well. No chest pains or shortness of breath or dyspnea. Has been seen by Dr. Sabrina Sanchez and has been scheduled for an outpatient event recorder. No need for any acute rhythm intervention at this time. He will be followed up by his primary care cardiologist upon discharge I believe Dr. Shad Horne.   Norman Martinez MD, [facility]

## 2020-08-20 NOTE — CARE COORDINATION
Case Management Assessment            Discharge Note                    Date / Time of Note: 8/20/2020 1:29 PM                  Discharge Note Completed by: Osiel Floyd    Patient Name: Zee Stiles   YOB: 1940  Diagnosis: Abnormal stress test [R94.39]   Date / Time: 8/18/2020  4:38 PM    Current PCP: Tim Matt patient: No    Hospitalization in the last 30 days: No    Advance Directives:  Code Status: Full Code  [place] DNR form completed and on chart: No    Financial:  Payor: [employer] / Plan: Betha Hews PLUS HMO / Product Type: *No Product type* /      Pharmacy:    [address] [place] [address] [phone] - F [phone]  [address] [place] [address]  Phone: [phone] Fax: [phone]    [id number] - [facility], [address] [place] [address] [phone] - F [phone]  [facility] [address]  Phone: [phone] Fax: [phone]      Assistance purchasing medications?: Potential Assistance Purchasing Medications: No  Assistance provided by Case Management: None at this time    Does patient want to participate in local refill/ meds to beds program?: No    Meds To Beds General Rules:  1. Can ONLY be done Monday- Friday between 8:30am-5pm  2. Prescription(s) must be in pharmacy by 3pm to be filled same day  3. Copy of patient's insurance/ prescription drug card and patient face sheet must be sent along with the prescription(s)  4. Cost of Rx cannot be added to hospital bill. If financial assistance is needed, please contact unit  or ;  or  CANNOT provide pharmacy voucher for patients co-pays  5.  Patients can then  the prescription on their way out of the hospital at discharge, or pharmacy can deliver to the bedside if staff is available. (payment due at time of pick-up or delivery - cash, check, or card accepted) Able to afford home medications/ co-pay costs: Yes    ADLS:  Current PT AM-PAC Score:   /24  Current OT AM-PAC Score:   /24      DISCHARGE Disposition: Home with Home Health Care: SN PT OT      LOC at discharge: Not Applicable  AZRA Completed: No    Notification completed in HENS/PAS?:  Not Applicable    IMM Completed:   Not Indicated    Transportation:  Transportation PLAN for discharge: [address] [place] [employer] service  [phone]   Mode of Transport: [address]  Reason for medical transport: Not Applicable  Name of [address] [place] [address],ANNAMARIE [address]: [address]    Phone: [phone]  Time of Transport: 1600    Transport form completed: No    Home Care:  [address] ordered at discharge: Evin Singletary: [employer]  Phone: [phone]  Fax: [phone]  Orders faxed: Yes    Durable Medical Equipment:  DME Provider: KEV  Equipment obtained during hospitalization: NA has  His own wheelchair here    Home Oxygen and Respiratory Equipment:  Oxygen needed at discharge?: No  [address]: Not Applicable  Portable tank available for discharge?: No    Dialysis:  Dialysis patient: No    Dialysis Center:  Not Applicable    Hospice Services:  Location: Not Applicable  Agency: Not Applicable    Consents signed: Not Indicated    Referrals made at [facility] for outpatient continued care:  Not Applicable    Additional CM Notes: CM confirmed  D/c home w/  KIRSTEN  W/  HHC  , [employer] : orders faxed:  Pt called mercedes  His  CM jayleen [facility] [phone] to inform hime of D/C today and will resume his services. ( 5 hours   3 days a week. )    No new Rx at this time and plans to follow up with Cardiology out pt : States to CM he  Has appt  8/27/2020  W/ CNP .      The Plan for Transition of Care is related to the following treatment goals of Abnormal stress test [R94.39]    The Patient and/or patient representative Tiffany Davidson and his family were provided with a choice of provider and agrees with the discharge plan Yes    Freedom of choice list was provided with basic dialogue that supports the patient's individualized plan of care/goals and shares the quality data associated with the providers.  Yes    Care Transitions patient: No    Mirna Brown RN  [facility] [employer]  Case Management Department  Ph: [phone]

## 2020-08-20 NOTE — ADT AUTH CERT
Utilization Reviews         Cardiology [facility] Day 2 (8/19/2020) by Ricardo Brown RN         Review Status  Review Entered    Completed  8/20/2020 09:16        Criteria Review       Care Day: 2 Care Date: 8/19/2020 Level of Care:    Guideline Day 2    Level Of Care    (X) Floor    8/20/2020 9:16 AM EDT by Felicity Macias    Clinical Status    (X) * No ICU or intermediate care needs    (X) Pulmonary and peripheral edema absent or improved    ( ) Arrhythmias absent or improved    8/20/2020 9:16 AM EDT by Cristel Minor      bradycardia and pauses occurring in the stress lab    * Milestone    Additional Notes    CARE DAY 2      8/19/2020       Scheduled Medications    · aspirin 81 mg Oral Daily    · atorvastatin 80 mg Oral Nightly    · pantoprazole 40 mg Oral QAM AC    · tamsulosin 0.4 mg Oral Daily    · sodium chloride flush 10 mL Intravenous 2 times per day    · enoxaparin 40 mg Subcutaneous Daily          VITALS:    BP 98/54    PULSE 58    RESP 18    TEMP 98.7    SPO2 95%       TELEMETRY: Sinus, sinus bradycardia           WBC 8.3    HGB 12.1              [facility]    Cardiology Inpatient    Daily Progress Note       Cardiovascular:       Rate and Rhythm: Normal rate and regular rhythm.       Heart sounds: Murmur present. Assessment & Plan:    1. CAD s/p CABG: Stress test results pending. Given patients ongoing episodes of chest heaviness, abnormal response during his stress test and Hx of CAD we will plan for coronary angiogram today afternoon    [facility]    NPO    Continue ASA & Statin    Hold BB due to bradycardia    Tele-monitoring         2. Bradycardia: Became bradycardic during stress test with five second pause. Previous EKGs with sinus bradycardia and first degree AV block with rates in the 40s. Consult EP    Hold BB    tele         3. HFpEF: Echo 1/13/20     Normal left ventricle size. There is mild concentric left ventricular     hypertrophy.  Overall left ventricular systolic function appears normal with     an ejection fraction of 55-60%. No regional wall motion abnormalities are     noted. Diastolic filling parameters suggests normal diastolic function.     Trace mitral and tricuspid regurgitation is present.     Estimated pulmonary artery systolic pressure is 30 mmHg assuming a right     atrial pressure of 3 mmHg.     Biatrial enlargement.         4. HLD:    Continue lipitor         Code Status:Full code    FEN: NPO    PPX: Lovenox     DISPO: IP       CARDIOLOGY CATH LAB NOTE    [facility]               Left heart catheterization with coronary angiography         Indication: Cardiac cath to rule out ischemic CAD, Possible angioplasty, The procedure and risks described to patient including risk of CVA, MI, bleeding, emergency surgery, death, patient was seen in the echo stress lab yesterday at which time he had significant sinus pauses during the stress test and had discomfort pressure and heaviness and known CAD.  Admitted for evaluation with electrophysiology concerning for possible pacemaker need and for angiography.  The stress nuclear study was abnormal and positive., Consent signed or positive stress test    Anesthesia: Moderate sedation with Versed and Fentanyl IV    Estimated blood loss :minimal    Assessment:    Patient with 5 vessel bypass in 4 of the grafts are in really great shape.  The DEANNA is atretic into the LAD.  We do not see any opportunities for intervention on this patient.  The LV function is good with normal ejection fraction of 55 to 60%. Recommendations:  We will continue his current therapy including anti-ischemic therapy.  Optimize lipid management with LDL target of 60 or less.  Blood pressure looks really good on current therapy. Denis Presley is being evaluated by EP because of the bradycardia and pauses occurring in the stress lab which may have been related to the [address] do not suspect that a pacemaker is necessary at this time as he has had good rhythm throughout the night.  Probably outpatient monitoring would be appropriate for this patient. Oswaldo Villarreal will await results and recommendation from electrophysiology. Ciarra Almanza MD, [facility]              This note was likely completed using voice recognition technology and may contain unintended errors    Ham M.D., [facility]    Cardiac Electrophysiology Consultation    Date: 8/19/2020    Admit Date:  8/18/2020    Reason for Consultation: Pause of 5 seconds during stress test    Consult Requesting Physician: Hui Meeks MD       HPI: Desiree Abraham is a 67 y.o. past medical history significant for CAD, status post CABG, heart failure preserved LV ejection fraction, LV ejection fraction 55 to 60%, hypertension, hyperlipidemia, BPH who was seen by cardiology as an outpatient and have concerning history of exertional chest discomfort.  Hence, patient had a Cookie Leech yesterday.  After he received Regadenoson, patient developed shortness of breath, hypotension and lost consciousness briefly.  At this time, he was noted to have a pause of 5 seconds.  Unfortunately, I do not have the telemetry strips as it was noted on the monitor.  Hence, patient was admitted in the hospital and he underwent coronary angiogram this morning by Dr. Osmin Coffman is no significant obstructive coronary artery disease that needed intervention was noted.  Secondary to the significant pause of 5 seconds, EP was consulted for further evaluation and management.         At baseline, he is reasonably active. Ying Bernard denies any symptoms of lightheadedness or syncopal episodes in the past.       Vitals:      08/19/20 1514    BP: (!) 84/44    Pulse: 57    Resp: 16    Temp: 97.7 °F (36.5 °C)    SpO2: 92%       Recommendation (s):         1.  Atypical chest discomfort    2.  Abnormal stress test    3.  CAD, status post CABG    4.  Possible 5 seconds during stress test           Even though Regadenoson have lower affinity to A1 and A3 receptors, and higher affinity to A2 receptors, he is specifically manifested symptoms of blockade of A1 and A3 receptors as documented by pause and shortness of breath during this episode.  He received a total of 150 mg of aminophylline and eventually patient got better. Shaylee Garcia does have a baseline sinus bradycardia and first-degree AV block.  In the telemetry, I do not see any high-grade sinus/AV block.  Hence, there is no urgent indication for pacemaker. Zohreh Gomez said that, he clearly have first-degree AV block and a sinus bradycardia with a heart rate of 45 to 50 bpm.  After this event, his beta-blocker is on hold secondary to concerns of heart block.  I would place him at 2 days CAM patch monitor to evaluate for any evidence of high degree block.         CAD, as per Dr. Yefri Elise, on aspirin and statins    BPH stable, on Flomax        PA Recommendation by Cyndi Mei RN         Review Status  Review Entered    In [facility]  2020 14:28        Criteria Review          PA Recommendation:     slr keep in  We recommend that the following pt's current hospitalization under INPATIENT   status is APPROPRIATE . If you agree, please place a new ADMIT order in [facility] as recommended.     Name: Landon Grant   : 1940   CSN: [id number]   [employer]        Clinical summary Admitted after stress test where patient had bradycardia to  40s associated with 5 second pause  Vitals Intermittent hodan   Labs and Imaging Stress showed small reversible defect and severe hypokinesis  of septal wall consistent with intermediate risk  Pt for [facility]  MCG criteria applies   Comments       This chart was reviewed at 1:24 PM 2020    Koki Turner MD   Physician [address]   CELL : [phone]

## 2020-08-20 NOTE — DISCHARGE INSTR - COC
Continuity of Care Form    Patient Name: Landon Grant   :  1940  MRN:  [id number]    Admit date:  2020  Discharge date:  ***    Code Status Order: Full Code   Advance Directives:   Advance Care Flowsheet Documentation     Date/Time Healthcare Directive Type of Healthcare Directive Copy in [address] Agent's Name Healthcare Agent's Phone Number    [phone]  Yes, patient has an advance directive for healthcare treatment  --  --  --  --  --          Admitting Physician:  Caprice Medley MD  PCP: Camilo Durham    Discharging Nurse: [facility] Unit/Room#: 7483/5171-93  Discharging Unit Phone Number: ***    Emergency Contact:   Extended Emergency Contact Information  Primary Emergency Contact: Preo Phone: [phone]  Work Phone: [phone]  Mobile Phone: [phone]  Relation: Child  Secondary Emergency Contact: Dana Callahan  Address: [address]  Home Phone: [phone]  Relation: Other    Past Surgical History:  Past Surgical History:   Procedure Laterality Date    BACK SURGERY  2006    lower lumbar    CORONARY ARTERY BYPASS GRAFT  2013    CABG x 5 (Dr Chauncey Turner), svg to diag, om1 and 3, distal rca, kelly to lad.      CYSTOSCOPY N/A 1/10/2019    CYSTOSCOPY performed by Naif Weems MD at [place] [address] Right 2015    ORI    SIGMOIDOSCOPY N/A 2020    SIGMOIDOSCOPY DIAGNOSTIC FLEXIBLE performed by Dee Dee Steiner MD at [address] [place] [address],Third Floor 2020    EGD BIOPSY performed by Dee Dee Steiner MD at [facility] ENDOSCOPY       Immunization History:   Immunization History   Administered Date(s) Administered    Influenza Vaccine, unspecified formulation 2012, 2014, 10/13/2015, 2016, 2017, 10/26/2018, 2019    Influenza Virus Vaccine 2013    Influenza Whole 10/01/2007, 2010, 2011, 2012, 10/13/2015    Pneumococcal Conjugate 13-valent ([id number]) 12/29/2014    Pneumococcal Polysaccharide ([id number]) 01/27/2014       Active Problems:  Patient Active Problem List   Diagnosis Code    Hypotension I95.9    Light headed R42    Cellulitis L03.90    Essential hypertension I10    GERD (gastroesophageal reflux disease) K21.9    Acute blood loss anemia D62    Tinea corporis B35.4    Carotid stenosis I65.29    CAD (coronary artery disease) I25.10    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 Spencer Needle    Complicated UTI (urinary tract infection) N39.0    ALIS (acute kidney injury) ([address] N17.9    Edema R60.9    Problem with urinary catheter ([facility] T83. 9XXA    Acute encephalopathy G93.40    Chronic indwelling Iglesias catheter Z96.0    Hyperlipidemia E78.5    Bilateral lower leg cellulitis L03.116, L03.115    Neurogenic bladder N31.9    Bacteriuria R82.71    Abnormality of urethral meatus Q64.70    Gross hematuria R31.0    CHF with unknown LVEF ([facility]) I50.9    Dyspnea R06.00    Bradycardia R00.1    Pseudomonas infection A49.8    Acute renal injury ([address] N17.9    Cellulitis of scrotum N49.2    Constipation K59.00    Encopresis with constipation and overflow incontinence R15.9    Abnormal stress test R94.39    H/O angiography Z92.89       Isolation/Infection:   Isolation          Contact        Patient Infection Status     Infection Onset Added Last Indicated Last Indicated By Review Planned Expiration Resolved Resolved By    MDRO (multi-drug resistant organism) 05/02/20 05/04/20 05/02/20 Culture, Urine        Resolved    COVID-19 Rule Out 06/10/20 06/10/20 06/10/20 COVID-19 (Ordered)   06/10/20 Rule-Out Test Resulted    C-diff Rule Out 06/09/20 06/09/20 06/09/20 GI Bacterial Pathogens By PCR (Ordered)   06/10/20 Rule-Out Test Resulted    C-diff Rule Out 06/09/20 06/09/20 06/09/20 Clostridium difficile toxin/antigen (Ordered)   06/09/20 Rule-Out Test Resulted    C-diff Rule Out 05/03/20 05/04/20 05/04/20 C. difficile toxin Molecular (Ordered)   05/05/20 Rule-Out Test Resulted    COVID-19 Rule Out 05/03/20 05/03/20 05/03/20 COVID-19 (Ordered)   05/03/20 Rule-Out Test Resulted    C-diff Rule Out 05/02/20 05/02/20 05/03/20 Clostridium difficile toxin/antigen (Ordered)   05/03/20 Rule-Out Test Resulted    MRSA 02/07/20 02/08/20 02/07/20 MRSA DNA Probe, Nasal   05/04/20 Bobbi Olsen RN    MDRO (multi-drug resistant organism) 10/26/19 10/28/19 11/19/19 Urine culture   01/16/20 Bobbi Olsen RN          Nurse Assessment:  Last Vital Signs: /65   Pulse 74   Temp 98.5 °F (36.9 °C) (Oral)   Resp 18   Ht 5' 11.5\" (1.816 m)   Wt 189 lb 9.5 oz (86 kg)   SpO2 96%   BMI 26.07 kg/m²     Last documented pain score (0-10 scale): Pain Level: 3  Last Weight:   Wt Readings from Last 1 Encounters:   08/19/20 189 lb 9.5 oz (86 kg)     Mental Status:  oriented and alert    IV Access:  - None    Nursing Mobility/ADLs:  Walking   Assisted  Transfer  Assisted  Bathing  Assisted  Dressing  Assisted  Toileting  Assisted  Feeding  Independent  Med Admin  Independent  Med Delivery   whole    Wound Care Documentation and Therapy:  Wound 06/09/20 Buttocks Mid;Left;Right -coccyx-gluteal cleft-posterior thighs    IAD/moisture associated dermatitis - diarrhea/?C-dif (Active)   Number of days: 71        Elimination:  Continence:   · Bowel: Yes  · Bladder: Yes  Urinary Catheter: Insertion Date: *** and Indication for Use of Catheter: [address] AZRA Indwelling WDTQ:[id number]}   Colostomy/Ileostomy/Ileal Conduit: {YES / IQ:19854}       Date of Last BM: ***    Intake/Output Summary (Last 24 hours) at 8/20/2020 1325  Last data filed at 8/20/2020 1048  Gross per 24 hour   Intake 582 ml   Output 1400 ml   Net -818 ml     I/O last 3 completed shifts:   In: 222 [P.O.:222]  Out: 1400 [RSWPO:2950]    Safety Concerns:     [address] AZRA Safety Concerns:042569429}    Impairments/Disabilities:      None    Nutrition Therapy:  Current Nutrition Therapy:   - Oral Diet:  General    Routes of Feeding: Oral  Liquids: No Restrictions  Daily Fluid Restriction: no  Last Modified Barium Swallow with Video (Video Swallowing Test): not done    Treatments at the Time of Hospital Discharge:   Respiratory Treatments: ***  Oxygen Therapy:  is not on home oxygen therapy.   Ventilator:    - No ventilator support    Rehab Therapies: {THERAPEUTIC INTERVENTION:8328913945}  Weight Bearing Status/Restrictions: No weight bearing restirctions  Other Medical Equipment (for information only, NOT a DME order):  {EQUIPMENT:573760831}  Other Treatments: ***    Patient's personal belongings (please select all that are sent with patient):  Holly    RN SIGNATURE:  Electronically signed by Caryn Chilel RN on 8/20/20 at 3:24 PM EDT    CASE MANAGEMENT/SOCIAL WORK SECTION    Inpatient Status Date: 08/18/2020    Readmission Risk Assessment Score:  Readmission Risk              Risk of Unplanned Readmission:        28           Discharging [facility] Facility/ Agency     [facility] [address] [facility] [address]       Phone: [phone]       Fax: [phone]          Dialysis Facility (if applicable) NA  · Name:  · Address:  · Dialysis Schedule:  · Phone:  · Fax:    / signature: Electronically signed by Raz Hayes RN on 8/20/20 at 1:26 PM EDT    PHYSICIAN SECTION    Prognosis: {Prognosis:8502735102}    Condition at Discharge: [address] Patient Condition:644486453}    Rehab Potential (if transferring to Rehab): {Prognosis:0394766631}    Recommended Labs or Other Treatments After Discharge: ***    Physician Certification: I certify the above information and transfer of Matt Herrera  is necessary for the continuing treatment of the diagnosis listed and that he requires {Admit to Appropriate Level of Care:83851} for {GREATER/LESS:174580116} 30 days.      Update Admission H&P: {CHP DME Changes in MRWTD:784439847}    PHYSICIAN SIGNATURE:  {Esignature:666427278}

## 2020-08-20 NOTE — PROGRESS NOTES
Two day cardiac monitor placed on pt. Instructions given to pt. Pt voiced an understanding of how to use the monitor.

## 2020-08-20 NOTE — PROGRESS NOTES
[address]       In Patient  Progress note        Alex Farmer MD,  [address] FNP [address] [facility]   Daily Progress Note      Admit Date:  8/18/2020  Primary Cardiologist : Rangel Hickman     CC: abnormal stress test / LHC     Interval Hx:  Mr. Antelmo Neff c/o cp with abnormal stress test and LHC completed  No c/o cp or SOB today   HR 50s sinus VSS / he will go home with heart CAM  monitor due to bradycardia   hold all neg chronotropics   Groin site  no complication from [address] [address]     PMH: CABG 2013, Negative Stress Test 2015  HLD: on Lipitor    LHC 8/19/20   Patient with 5 vessel bypass in 4 of the grafts are in really great shape. The DEANNA is atretic into the LAD. We do not see any opportunities for intervention on this patient. The LV function is good with normal ejection fraction of 55 to 60%. Recommendations: We will continue his current therapy including anti-ischemic therapy. Optimize lipid management with LDL target of 60 or less. Blood pressure looks really good on current therapy. He is being evaluated by EP because of the bradycardia and pauses occurring in the stress lab which may have been related to the [facility]. I do not suspect that a pacemaker is necessary at this time as he has had good rhythm throughout the night. Probably outpatient monitoring would be appropriate for this patient. We will await results and recommendation from electrophysiology. Alex Farmer      Echo 1/13/20   Normal left ventricle size. There is mild concentric left ventricular   hypertrophy. Overall left ventricular systolic function appears normal with   an ejection fraction of 55-60%. No regional wall motion abnormalities are   noted.  Diastolic filling parameters suggests normal diastolic function.   Trace mitral and tricuspid regurgitation is present.   Estimated pulmonary artery systolic pressure is 30 mmHg assuming a right   atrial pressure of 3 mmHg.   Biatrial enlargement. I have examined pt and reviewed notes / any lab work EKGs stress test, angiograms, & images reviewed  I  have spent >20 minutes of face to face time with the patient with more than 50% spent counseling and coordinating care this patient. Objective:   /65   Pulse 52   Temp 97.6 °F (36.4 °C) (Oral)   Resp 18   Ht 5' 11.5\" (1.816 m)   Wt 189 lb 9.5 oz (86 kg)   SpO2 96%   BMI 26.07 kg/m²       Intake/Output Summary (Last 24 hours) at 8/20/2020 0931  Last data filed at 8/20/2020 0513  Gross per 24 hour   Intake 222 ml   Output 1400 ml   Net -1178 ml     Wt Readings from Last 3 Encounters:   08/19/20 189 lb 9.5 oz (86 kg)   06/29/20 202 lb (91.6 kg)   06/11/20 213 lb 10 oz (96.9 kg)       Physical Exam:   /65   Pulse 52   Temp 97.6 °F (36.4 °C) (Oral)   Resp 18   Ht 5' 11.5\" (1.816 m)   Wt 189 lb 9.5 oz (86 kg)   SpO2 96%   BMI 26.07 kg/m²   BP Readings from Last 3 Encounters:   08/20/20 122/65   08/18/20 136/73   07/23/20 118/76     Pulse Readings from Last 3 Encounters:   08/20/20 52   08/18/20 70   07/23/20 78       Intake/Output Summary (Last 24 hours) at 8/20/2020 0931  Last data filed at 8/20/2020 0513  Gross per 24 hour   Intake 222 ml   Output 1400 ml   Net -1178 ml     Wt Readings from Last 2 Encounters:   08/19/20 189 lb 9.5 oz (86 kg)   06/29/20 202 lb (91.6 kg)     Constitutional: He is oriented to person, place, and time. He appears well-developed and well-nourished. In no acute distress. Head: Normocephalic and atraumatic. Eyes: PEERL   Neck: Neck supple. No JVD present. Carotid bruit is not present. No mass and no thyromegaly present. No lymphadenopathy present. Cardiovascular: Normal rate, regular rhythm, normal heart sounds and intact distal pulses. Exam reveals no gallop and no friction rub. No murmur heard. Pulmonary/Chest: Effort normal and breath sounds normal. No respiratory distress.  He has no wheezes, rhonchi or rales.   Abdominal: Soft, non-tender. Bowel sounds and aorta are normal. He exhibits no organomegaly, mass or bruit. Extremities: No edema, cyanosis, or clubbing. Pulses are 2+ radial/carotid/dorsalis pedis and posterior tibial bilaterally. Neurological: oriented to person place    Skin: Skin is warm and dry. There is no rash or diaphoresis. Psychiatric: He has a normal mood and affect. His speech is normal and behavior is normal.     Medications:    aspirin  81 mg Oral Daily    atorvastatin  80 mg Oral Nightly    pantoprazole  40 mg Oral QAM AC    tamsulosin  0.4 mg Oral Daily    sodium chloride flush  10 mL Intravenous 2 times per day    enoxaparin  40 mg Subcutaneous Daily     Recent Labs     08/18/20  1722 08/19/20  0455 08/20/20  0612   WBC 10.9 8.3 8.7   HGB 13.8 12.1* 11.3*    160 135     BMP:    Recent Labs     08/18/20  1722 08/19/20  0456    139   K 4.1 4.5   CO2 27 23   BUN 37* 39*   CREATININE 1.2 1.3     Reviewed  available lab work,  EKGs, images, [facility]         Assessment    abnormal stress test with cp   CABG 2013   Negative Stress Test 2015    HLD: on Lipitor    [facility] 8/19/20   Patient with 5 vessel bypass in 4 of the grafts are in really great shape. The DEANNA is atretic into the LAD. We do not see any opportunities for intervention on this patient. The LV function is good with normal ejection fraction of 55 to 60%. Recommendations: We will continue his current therapy including anti-ischemic therapy. Optimize lipid management with LDL target of 60 or less. Blood pressure looks really good on current therapy. He is being evaluated by EP because of the bradycardia and pauses occurring in the stress lab which may have been related to the [facility]. I do not suspect that a pacemaker is necessary at this time as he has had good rhythm throughout the night. Probably outpatient monitoring would be appropriate for this patient.   We will await results and recommendation from electrophysiology. Wheatcroft Alejandra      Echo 1/13/20   Normal left ventricle size. There is mild concentric left ventricular   hypertrophy. Overall left ventricular systolic function appears normal with   an ejection fraction of 55-60%. No regional wall motion abnormalities are   noted. Diastolic filling parameters suggests normal diastolic function.   Trace mitral and tricuspid regurgitation is present.   Estimated pulmonary artery systolic pressure is 30 mmHg assuming a right   atrial pressure of 3 mmHg.   Biatrial enlargement. Bradycardia  Dr Leslie Cormier consulted   Hold BB  Heart monitor to wear when discharged   \"Even though Regadenoson have lower affinity to A1 and A3 receptors, and higher affinity to A2 receptors, he is specifically manifested symptoms of blockade of A1 and A3 receptors as documented by pause and shortness of breath during this episode. He received a total of 150 mg of aminophylline and eventually patient got better. He does have a baseline sinus bradycardia and first-degree AV block. In the telemetry, I do not see any high-grade sinus/AV block. Hence, there is no urgent indication for pacemaker. Having said that, he clearly have first-degree AV block and a sinus bradycardia with a heart rate of 45 to 50 bpm.  After this event, his beta-blocker is on hold secondary to concerns of heart block. I would place him at 2 days CAM patch monitor to evaluate for any evidence of high degree block\".       Plan:  Mr. Fausto Flowers c/o cp with abnormal stress test and [facility] completed / no intervention  No c/o cp or SOB today   HR 50s / he will go home with Holter monitor due to bradycardia   hold all neg chronotropics   Groin site no complication from [facility]   discharge home and fu with cardiolgy in 1-2 weeks    FRITZ Garcia

## 2020-08-20 NOTE — CARE COORDINATION
CTN called Andreia with [employer] that patient has d/c order. [employer] will pull orders from epic.  Electronically signed by Steph Schumacher LPN on 8/17/3593 at 42:25 AM

## 2020-08-20 NOTE — PLAN OF CARE
Problem: Skin Integrity:  Goal: Will show no infection signs and symptoms  Description: Will show no infection signs and symptoms  8/20/2020 0056 by Drew Zepeda RN  Outcome: Ongoing  Note: Pt at risk for skin breakdown. Patient is scattered bruising and abrasions and stage 1 R shin, mepalex on. On specialty mattress. Skin assessment as documented. Pts skin cleansed with inc cleanser as needed. Pt repositioned in bed with pillow support as needed. Call light within reach. Will continue to monitor. Problem: Skin Integrity:  Goal: Absence of new skin breakdown  Description: Absence of new skin breakdown  Outcome: Ongoing     Problem: Falls - Risk of:  Goal: Will remain free from falls  Description: Will remain free from falls  8/20/2020 0056 by Drew Zepeda RN  Outcome: Ongoing  Note: Patient is a fall risk. Patient uses a wheelchair. See Fall Risk assessment for details. Bed is in low, lock position; call light/belongings within reach. No attempts to get out of bed have been made, calls appropriately when assistance is needed. Bed alarm and hourly rounds in place for safety. Will continue to monitor and reassess throughout shift. Problem: Falls - Risk of:  Goal: Absence of physical injury  Description: Absence of physical injury  Outcome: Ongoing     Problem: Pain:  Goal: Pain level will decrease  Description: Pain level will decrease  Outcome: Ongoing  Note: Pt resting at this time. No complaints of pain. Encouraged patient to call out with any needs. Will continue to monitor.        Problem: Pain:  Goal: Control of acute pain  Description: Control of acute pain  Outcome: Ongoing     Problem: Pain:  Goal: Control of chronic pain  Description: Control of chronic pain  Outcome: Ongoing

## 2020-08-20 NOTE — PLAN OF CARE
Problem: Skin Integrity:  Goal: Will show no infection signs and symptoms  Description: Will show no infection signs and symptoms  8/20/2020 1018 by Ajay Chávez RN  Outcome: Ongoing  8/20/2020 0056 by Temo Barrett RN  Outcome: Ongoing  Note: Pt at risk for skin breakdown. Patient is scattered bruising and abrasions and stage 1 R shin, mepalex on. On specialty mattress. Skin assessment as documented. Pts skin cleansed with inc cleanser as needed. Pt repositioned in bed with pillow support as needed. Call light within reach. Will continue to monitor. Goal: Absence of new skin breakdown  Description: Absence of new skin breakdown  8/20/2020 1018 by Ajay Chávez RN  Outcome: Ongoing  8/20/2020 0056 by Temo Barrett RN  Outcome: Ongoing     Problem: Falls - Risk of:  Goal: Will remain free from falls  Description: Will remain free from falls  8/20/2020 1018 by Ajay Chávez RN  Outcome: Ongoing  8/20/2020 0056 by Temo Barrett RN  Outcome: Ongoing  Note: Patient is a fall risk. Patient uses a wheelchair. See Fall Risk assessment for details. Bed is in low, lock position; call light/belongings within reach. No attempts to get out of bed have been made, calls appropriately when assistance is needed. Bed alarm and hourly rounds in place for safety. Will continue to monitor and reassess throughout shift. Pt resting in bed with bed in lowest position and bed alarm on. Call light within reach. Will continue to monitor.     Goal: Absence of physical injury  Description: Absence of physical injury  8/20/2020 1018 by Ajay Chávez RN  Outcome: Ongoing  8/20/2020 0056 by Temo Barrett RN  Outcome: Ongoing     Problem: Tissue Perfusion - Cardiopulmonary, Altered:  Goal: Absence of angina  Description: Absence of angina  Outcome: Ongoing  Goal: Hemodynamic stability will improve  Description: Hemodynamic stability will improve  Outcome: Ongoing     Problem: Nutrition  Goal: Optimal nutrition therapy  Outcome:

## 2020-08-27 ENCOUNTER — OFFICE VISIT (OUTPATIENT)
Dept: CARDIOLOGY CLINIC | Age: 80
End: 2020-08-27
Payer: MEDICARE

## 2020-08-27 VITALS
SYSTOLIC BLOOD PRESSURE: 130 MMHG | WEIGHT: 186 LBS | HEART RATE: 84 BPM | BODY MASS INDEX: 25.58 KG/M2 | DIASTOLIC BLOOD PRESSURE: 58 MMHG

## 2020-08-27 PROBLEM — R93.89 ABNORMAL ANGIOGRAM: Status: ACTIVE | Noted: 2020-08-27

## 2020-08-27 PROCEDURE — 1036F TOBACCO NON-USER: CPT | Performed by: NURSE PRACTITIONER

## 2020-08-27 PROCEDURE — G8427 DOCREV CUR MEDS BY ELIG CLIN: HCPCS | Performed by: NURSE PRACTITIONER

## 2020-08-27 PROCEDURE — G8417 CALC BMI ABV UP PARAM F/U: HCPCS | Performed by: NURSE PRACTITIONER

## 2020-08-27 PROCEDURE — 1123F ACP DISCUSS/DSCN MKR DOCD: CPT | Performed by: NURSE PRACTITIONER

## 2020-08-27 PROCEDURE — 99214 OFFICE O/P EST MOD 30 MIN: CPT | Performed by: NURSE PRACTITIONER

## 2020-08-27 PROCEDURE — 4040F PNEUMOC VAC/ADMIN/RCVD: CPT | Performed by: NURSE PRACTITIONER

## 2020-08-27 PROCEDURE — 1111F DSCHRG MED/CURRENT MED MERGE: CPT | Performed by: NURSE PRACTITIONER

## 2020-08-27 NOTE — PROGRESS NOTES
had good rhythm throughout the night.  Probably outpatient monitoring would be appropriate for this patient. Leonardo iKm will await results and recommendation from electrophysiology.    Silvia Cruz    Echo 1/13/20   Normal left ventricle size. There is mild concentric left ventricular   hypertrophy. Overall left ventricular systolic function appears normal with   an ejection fraction of 55-60%. No regional wall motion abnormalities are   noted. Diastolic filling parameters suggests normal diastolic function.   Trace mitral and tricuspid regurgitation is present.   Estimated pulmonary artery systolic pressure is 30 mmHg assuming a right   atrial pressure of 3 mmHg.   Biatrial enlargement. I have examined pt and reviewed notes / any lab work EKGs stress test, angiograms, & images reviewed  I  have spent >20 minutes of face to face time with the patient with more than 50% spent counseling and coordinating care this patient. Review of Systems:  Constitutional: Denies  fatigue, weakness, night sweats or fever. HEENT: Denies new visual changes, ringing in ears, nosebleeds,nasal congestion  Respiratory: Denies new or change in SOB, PND, orthopnea or cough. Cardiovascular: see HPI  GI: Denies N/V, diarrhea, constipation, abdominal pain, change in bowel habits, melena or hematochezia  : Denies urinary frequency, urgency, incontinence, hematuria or dysuria. Skin: Denies rash, hives, or cyanosis  Musculoskeletal: Denies joint or muscle aches/pain  Neurological: Denies syncope or TIA-like symptoms.   Psychiatric: Denies anxiety, insomnia or depression     Past Medical History:   Diagnosis Date    BPH (benign prostatic hyperplasia)     Carotid stenosis 12/2013    JESU 52-30% stenosis; LICA 22-04% stenosis    Cellulitis 12/2013    LLE    Chronic back pain     Diverticular disease     Erectile dysfunction     GERD (gastroesophageal reflux disease)     History of atrial fibrillation     Hypercholesteremia     Hypertension     Lower GI bleed     MDRO (multiple drug resistant organisms) resistance 10/26/2019    urine    Neuromuscular disorder (HCC)     spasticity    Renal insufficiency     Risk for falls     uses walker    Tinea corporis 2013    LLE    Vitamin B12 deficiency      Past Surgical History:   Procedure Laterality Date    BACK SURGERY  2006    lower lumbar    CORONARY ARTERY BYPASS GRAFT  2013    CABG x 5 (Dr Trina Restrepo), svg to diag, om1 and 3, distal rca, kelly to lad.      CYSTOSCOPY N/A 1/10/2019    CYSTOSCOPY performed by Enedina [facility] at [facility] 169 Right 2015    ORIF    SIGMOIDOSCOPY N/A 2020    SIGMOIDOSCOPY DIAGNOSTIC FLEXIBLE performed by Syd Maguire MD at [facility] N/A 2020    EGD BIOPSY performed by Syd Maguire MD at [facility] ENDOSCOPY     Family History   Problem Relation Age of Onset    Heart Disease Father      Social History     Tobacco Use    Smoking status: Former Smoker     Last attempt to quit: 1969     Years since quittin.7    Smokeless tobacco: Never Used   Substance Use Topics    Alcohol use: No    Drug use: No       Allergies   Allergen Reactions    Bactrim [Sulfamethoxazole-Trimethoprim] Rash     Current Outpatient Medications   Medication Sig Dispense Refill    vitamin B-12 (CYANOCOBALAMIN) 1000 MCG tablet Take 1,000 mcg by mouth daily      furosemide (LASIX) 40 MG tablet Take 1 tablet by mouth daily (Patient taking differently: Take 40 mg by mouth 2 times daily ) 45 tablet 3    potassium chloride (KLOR-CON M) 20 MEQ extended release tablet Take 1 tablet by mouth daily 30 tablet 3    polyethylene glycol (GLYCOLAX) 17 GM/SCOOP powder Take 17 g by mouth daily (Patient taking differently: Take 17 g by mouth as needed ) 1530 g 0    aspirin 81 MG chewable tablet Take 1 tablet by mouth daily 30 tablet 3    pantoprazole (PROTONIX) 40 MG tablet Take 1 normal diastolic function.   Trace mitral and tricuspid regurgitation is present.   Estimated pulmonary artery systolic pressure is 30 mmHg assuming a right   atrial pressure of 3 mmHg.   Biatrial enlargement. Hx bradycardia  HR 84 no c/o dizziness or presyncope   hx bradycardia HR 84 / no c/o dizziness or presyncope (the bradycardia occurred with adenosine)   Winston event:while in hosp   EP consult: After he received Regadenoson, patient developed shortness of breath, hypotension and lost consciousness briefly. At this time, he was noted to have a pause of 5 seconds. Unfortunately, I do not have the telemetry strips as it was noted on the monitor. Hence, patient was admitted in the hospital and he underwent coronary angiogram this morning by Dr. Jill Gutierrez. There is no significant obstructive coronary artery disease that needed intervention was noted. Secondary to the significant pause of 5 seconds, EP was consulted for further evaluation and management.       HTN  optimal     HLD   LDL 32 optimal     Plan:  with CAD he is on statin asa lasix kdur  / will restart BB at low dose   His CAM heart is not back thus far so will not order BB until we get results  Fu in  6 months with blood work   Fu next week with a phone visit   Wants to do cardiac rehab phase II if insurance allows       FRITZ Irvin

## 2020-08-28 ENCOUNTER — TELEPHONE (OUTPATIENT)
Dept: CARDIOLOGY CLINIC | Age: 80
End: 2020-08-28

## 2020-08-28 NOTE — TELEPHONE ENCOUNTER
Pt called in requesting that his lab results from 8-14-20 be mailed to him. Pt can be reached at [phone].  Thanks

## 2020-09-03 ENCOUNTER — VIRTUAL VISIT (OUTPATIENT)
Dept: CARDIOLOGY CLINIC | Age: 80
End: 2020-09-03
Payer: MEDICARE

## 2020-09-03 PROCEDURE — 0298T PR EXT ECG > 48HR TO 21 DAY REVIEW AND INTERPRETATN: CPT | Performed by: INTERNAL MEDICINE

## 2020-09-03 PROCEDURE — 99443 PR PHYS/QHP TELEPHONE EVALUATION 21-30 MIN: CPT | Performed by: NURSE PRACTITIONER

## 2020-09-03 NOTE — PROGRESS NOTES
Consent:  He  & health care decision maker is aware that that he may receive a bill for this virual service, depending on his insurance coverage, and has provided verbal consent to proceed: yes   Documentation:  I communicated with the patient and/or health care decision maker about our discussions of care. Details of this discussion including any medical advice provided:    I affirm this is a Patient Initiated Episode with an Established Patient who has not had a related appointment within my department in the past 7 days or scheduled within the next 24 hours. Total Time:>20-25 minutes       [address]   In Patient  Progress note   Fam CARREON,  [address] [facility]       Cardiology   Phone  visit  Note      CC: Interval Hx: Me. Mckayla followed by cardiology for CAD/stable  bradycardia / wore CAM with HR 34 lowest / max 127 / average 59  / 1st  degree AV Block  /  3 beat  AT   He is not on neg chronotropics & has no c/o dizizines or presyncope  Will get STEPHANI study he feels he may have STEPHANI   Dicussed CAM/ labs and [facility]   Fu with me in 3 months       Recent  [facility] 8/19/20   Patient with 5 vessel bypass in 4 of the grafts are in really great shape.  The DEANNA is atretic into the LAD. We do not see any opportunities for intervention on this patient.  The LV function is good with normal ejection fraction of 55 to 60%. Echo 1/14/20 Normal left ventricle size. There is mild concentric left ventricular   hypertrophy. Overall left ventricular systolic function appears normal with   an ejection fraction of 55-60%. No regional wall motion abnormalities are   noted. Diastolic filling parameters suggests normal diastolic function. Trace mitral and tricuspid regurgitation is present. Estimated pulmonary artery systolic pressure is 30 mmHg assuming a right   atrial pressure of 3 mmHg. Biatrial enlargement.     I have reviewed notes / any lab work EKGs stress test, angiograms, & images reviewed  I  have spent >20 minutes with pt on phone or on video visit with the patient with more than 50% spent counseling and coordinating care this patient. Objective: There were no vitals taken for this visit. No intake or output data in the 24 hours ending 09/03/20 1124  Wt Readings from Last 3 Encounters:   08/27/20 186 lb (84.4 kg)   08/19/20 189 lb 9.5 oz (86 kg)   06/29/20 202 lb (91.6 kg)       Physical Exam:   There were no vitals taken for this visit. BP Readings from Last 3 Encounters:   08/27/20 (!) 130/58   08/20/20 139/65   08/18/20 136/73     Pulse Readings from Last 3 Encounters:   08/27/20 84   08/20/20 74   08/18/20 70     No intake or output data in the 24 hours ending 09/03/20 1124  Wt Readings from Last 2 Encounters:   08/27/20 186 lb (84.4 kg)   08/19/20 189 lb 9.5 oz (86 kg)     Constitutional: He is oriented to person, place, and time / in NAD   Vitals /wt per patient at home     Reviewed  available lab work,  EKGs, images, C       Assessment    CAD/stable  recent  [facility] 8/19/20   Patient with 5 vessel bypass in 4 of the grafts are in really great shape.  The DEANNA is atretic into the LAD. We do not see any opportunities for intervention on this patient.  The LV function is good with normal ejection fraction of 55 to 60%. Echo 1/14/20 Normal left ventricle size. There is mild concentric left ventricular   hypertrophy. Overall left ventricular systolic function appears normal with   an ejection fraction of 55-60%. No regional wall motion abnormalities are   noted. Diastolic filling parameters suggests normal diastolic function. Trace mitral and tricuspid regurgitation is present. Estimated pulmonary artery systolic pressure is 30 mmHg assuming a right   atrial pressure of 3 mmHg. Biatrial enlargement.     Bradycardia  wore CAM with HR 34 lowest / max 127 / average 59  / 1st  degree AV  Block  3 beat  AT   He is not on neg chronotropics & has no c/o dizizines or presyncope    STEPHANI?    will get STEPHANI study he feels he may have STEPHANI      HTN  optimal      HLD   LDL 32 optimal       Plan:  sleep study referral  Fu in 3 months     Aric Aleman APRN, CVNP

## 2020-09-21 ENCOUNTER — HOSPITAL ENCOUNTER (EMERGENCY)
Age: 80
Discharge: HOME | End: 2020-09-21
Attending: EMERGENCY MEDICINE
Payer: MEDICARE

## 2020-09-21 VITALS
OXYGEN SATURATION: 97 % | TEMPERATURE: 98.9 F | RESPIRATION RATE: 16 BRPM | SYSTOLIC BLOOD PRESSURE: 147 MMHG | WEIGHT: 198 LBS | DIASTOLIC BLOOD PRESSURE: 62 MMHG | BODY MASS INDEX: 27.72 KG/M2 | HEART RATE: 72 BPM | HEIGHT: 71 IN

## 2020-09-21 PROCEDURE — 99283 EMERGENCY DEPT VISIT LOW MDM: CPT

## 2020-09-21 ASSESSMENT — ENCOUNTER SYMPTOMS
BACK PAIN: 0
DIARRHEA: 0
VOMITING: 0
ABDOMINAL PAIN: 0
COUGH: 0
SHORTNESS OF BREATH: 0
NAUSEA: 0

## 2020-09-21 NOTE — ED PROVIDER NOTES
ED Attending Attestation Note     Date of evaluation: 9/21/2020    This patient was seen by the advance practice provider. I have seen and examined the patient, agree with the workup, evaluation, management and diagnosis. The care plan has been discussed. My assessment reveals an older gentleman who presents with pain to his tooth. He is also concerned that swallowing his tooth might have done him damage. Is unsure when his tooth chipped off. On exam has a missing fragment of the tooth on the left lower jaw without any surrounding fluctuance to his gums.      Duke Mcbride MD  09/21/20 [id number]

## 2020-09-21 NOTE — ED NOTES
Bed: A04-04  Expected date:   Expected time:   Means of arrival:   Comments:  Sharyn Blanca RN  09/21/20 [id number]

## 2020-09-21 NOTE — ED NOTES
Entire linen change done d/t extensive stooling and soiling the bed. Perineal care provided with warm soapy water. Meatus care done and educated patient on how to perform it himself. States understanding. Sacral heart applied to buttocks. Dirty clothes removed and placed patient in clean hospital gown.  to bedside to speak with patient. Update given to her prior to he entering the room.      Anamaria Mari RN  09/21/20 7511

## 2020-09-21 NOTE — CARE COORDINATION
SW received call regarding patient in ED who presents very disheveled with dried feces on his under carriage and is constipation. Patient states he has a home health care RN. Nurse on ED suggests he may need more services. Patient recently discharged from [facility] on 8/20 with [facility]. SW contacted [facility] to inquire about the services patient is current with them for, and they confirmed patient is currently active with them for - he has been visited by a nurse once a week and they see that in the past note they cleaned his rear end, but it was not dried. SW asked if they had reached out to the patient's PCP Dr. Atha Hatchet to add more services and they had not, they will add a SW to this patient's case and reach out to Dr. Atha Hatchet to discuss further needs. SW will contact [facility] when/if patient is discharged from ED to update them.      ARIC Hua, CARLIN    [phone]  Electronically signed by ARIC Loyola, CARLIN on 9/21/2020 at 1:13 PM

## 2020-09-21 NOTE — ED NOTES
1000ml soap suds enema given and patient placed on bedpan     Leonelabibiana Dumont RN  09/21/20 6339

## 2020-09-21 NOTE — CARE COORDINATION
Case Management Assessment            Discharge Note    Date / Time of Note: 9/21/2020 2:28 PM  Discharge Note Completed by: Kinsey Caroljacky    Patient assessed by SW at bedside. Patient is a 78 y/o male in the ED with dental issue, but found to be disheveled with dried feces in his under carriage and dirty wound dressing. Patient states that he lives with his daughter, is wheelchair bound, and when he needs to use the bathroom get up using his lift chair, then to his wheelchair and then to his bedside commode. Patient tries to clean himself, but states [facility] aides were coming to help him clean/bath but have stopped due to staffing issues. SW left a voice message for patient's daughter, to have her return SW call to provide more information. SW spoke with [facility]  Jorge Doyle [phone] to verify that pt has not been receiving aides lately, [facility] states that Donavon Simmonds has low staffing problems but were to resume patient care this week. SW told [facility] this is urgent for patient's health and for him to contact Linus Moraless to get out to the patient sooner, today or tomorrow. [facility] said he would. Patient is Active with [facility] for nursing care once a week. SW contacted [facility] and reviewed the patient's state when he arrived in the ED, they are going to add a SW to this patient's case for help with accessing other services. SW also advise them to contact patient's PCP to inform PCP of patient's current status. Finally, SW reviewed all the above with patient and his is agreeable to all the above. SW discussed his living conditions, per [facility] CM, that aides report daughter has multiple cats with liter boxes that are not clean and cat feces in the house and the house if filthy. Patient states that she does have 3 cats but that daughter cleans the cat boxes daily.      SW then touched on what the patient would do if he needed more care than aides from [address] can provide and more than his daughter can provide. Patient brought up assisted living as a possibility, but he is not interested in that option at this time. Patient is aware of the option if he ever feels he needs more care. Daughter returned SW call and SW asked if she had noticed patient smells and is disheveled and that the aids from [facility] were not coming to clean patient. Daughter acted Shocked. But SW did not believe that she was not aware. SW provided daughter with [facility] CM name and phone number to keep up on the aides that are supposed to come and informed her of discharge time today. Patient Name: Mauricio Awad   YOB: 1940  Diagnosis: No admission diagnoses are documented for this encounter. Date / Time: 9/21/2020  9:51 AM    [facility]    Hospitalization in the last 30 days: Yes    Advance Directives:  Code Status: Prior  [place] DNR form completed and on chart: Not Indicated    Financial:  Payor: Cecilia Winkler / Plan: Virgil Sweeney / Product Type: *No Product type* /      Pharmacy:    [address] [place] [address] [phone] - F [phone]  [address] [place] [id number]  Phone: [phone] Fax: [phone]    Sal Carranza [address] [place] [address] [phone] Cecilia Mills [address] [place] [id number]  Phone: [phone] Fax: [phone]      Assistance purchasing medications?:    Assistance provided by Case Management: None at this time    Does patient want to participate in local refill/ meds to beds program?:      Meds To Beds General Rules:  1. Can ONLY be done Monday- Friday between 8:30am-5pm  2. Prescription(s) must be in pharmacy by 3pm to be filled same day  3. Copy of patient's insurance/ prescription drug card and patient face sheet must be sent along with the prescription(s)  4. Cost of Rx cannot be added to hospital bill.  If financial assistance

## 2020-09-21 NOTE — ED NOTES
Pt noted to have clothes on that appear to have been on for an extended time. Pt with dried stool that covers entire perineum. Both buttocks with excoriation. Left anterior thigh with blistered areas where previous schneider securing device had been. New one in place to right thigh on arrival to ED. Tip of urinary meatus and schneider catheter caked with dried material. Appears to not have had schneider care in quite some time. Pt states he does try to do that himself. Pt reports home health aide hasn't visited in over a week. I asked the patient if his daughter ever bathes him or if he attempts to bath himself and he said \"no\". Importance of good hygiene with an indwelling schneider catheter explained to the patient and explained increased risk of UTI. Pt voiced understanding.  called to evaluate the patient's current living situation and to look into the home care agency.      Luiza Ramirez RN  09/21/20 [id number]

## 2020-09-21 NOTE — ED NOTES
Skin tear with tegaderm noted to right forearm. Brown foul-smelling drainage under tegaderm noted. States has been in place for 4 days. Dsg removed. Cleansed area with soap and sterile saline. No sign of infection. Foul odor resolved after area cleansed. Adaptic, 4x4's and gauze applied to area.       Brett Castorena RN  09/21/20 0792

## 2020-09-21 NOTE — ED PROVIDER NOTES
[address] [facility] 71 ENCOUNTER          PHYSICIAN ASSISTANT NOTE       Date of evaluation: 9/21/2020    Chief Complaint     Dental Injury (Broke tooth off while eating yesterday and believes he swallowed it. States he'd like to know where it is in his system. Denies pain. Left lower molar partially broken off.)      History of Present Illness     Lino Heck is a 79 y.o. male who presents HTN, HLD who presents the emergency department due to concern for swallowing a tooth. Patient states that yesterday evening he noticed Tooth 22 was chipped. He believes it chipped while eating a sandwich that afternoon. Does not recall any specific trauma or injury. Does not specifically recall swallowing the tooth. Patient denies any foreign body sensation in his throat. He states he has not had a bowel movement in the last 3 days. He states it is not atypical for him to go 1 to 2 weeks without having a bowel movement. He does use MiraLAX intermittently, however not daily. Denies abdominal pain, nausea, vomiting, diarrhea, or fevers. Patient came to the emergency department today because he wanted to get an MRI of his body to see where the tooth is. Review of Systems     Review of Systems   Constitutional: Negative for activity change, chills and fever. HENT: Negative for congestion. Eyes: Negative for visual disturbance. Respiratory: Negative for cough and shortness of breath. Cardiovascular: Negative for chest pain. Gastrointestinal: Negative for abdominal pain, diarrhea, nausea and vomiting. Genitourinary: Negative for difficulty urinating. Musculoskeletal: Negative for back pain. Neurological: Negative for dizziness, weakness and headaches. Psychiatric/Behavioral: Negative for agitation.        Past Medical, Surgical, Family, and Social History     He has a past medical history of BPH (benign prostatic hyperplasia), Carotid stenosis, Cellulitis, Chronic back pain, Diverticular disease, Erectile dysfunction, GERD (gastroesophageal reflux disease), History of atrial fibrillation, Hypercholesteremia, Hypertension, Lower GI bleed, MDRO (multiple drug resistant organisms) resistance, Neuromuscular disorder ([address] Renal insufficiency, Risk for falls, Tinea corporis, and Vitamin B12 deficiency. He has a past surgical history that includes back surgery (2006); Foot surgery; Coronary artery bypass graft (12/13/2013); hip surgery (Right, 5/1/2015); Cystoscopy (N/A, 1/10/2019); Upper gastrointestinal endoscopy (N/A, 5/4/2020); and sigmoidoscopy (N/A, 6/11/2020). His family history includes Heart Disease in his father. He reports that he quit smoking about 50 years ago. He has never used smokeless tobacco. He reports that he does not drink alcohol or use drugs. Medications     Previous Medications    ASPIRIN 81 MG CHEWABLE TABLET    Take 1 tablet by mouth daily    ATORVASTATIN (LIPITOR) 80 MG TABLET    Take 80 mg by mouth nightly. BALSAM PERU-CASTOR OIL (VENELEX) OINT OINTMENT    Apply topically 2 times daily    FUROSEMIDE (LASIX) 40 MG TABLET    Take 1 tablet by mouth daily    MELATONIN 10 MG TABS    Take 10 mg by mouth nightly as needed     PANTOPRAZOLE (PROTONIX) 40 MG TABLET    Take 1 tablet by mouth every morning (before breakfast)    POTASSIUM CHLORIDE (KLOR-CON M) 20 MEQ EXTENDED RELEASE TABLET    Take 1 tablet by mouth daily    TAMSULOSIN (FLOMAX) 0.4 MG CAPSULE    Take 0.8 mg by mouth daily     VITAMIN B-12 (CYANOCOBALAMIN) 1000 MCG TABLET    Take 1,000 mcg by mouth daily    ZOLPIDEM (AMBIEN) 10 MG TABLET    Take 10 mg by mouth nightly as needed for Sleep. Allergies     He is allergic to bactrim [sulfamethoxazole-trimethoprim]. Physical Exam     INITIAL VITALS: BP: (!) 113/55, Temp: 98.9 °F (37.2 °C), Pulse: 72, Resp: (!) 6,    Physical Exam  Constitutional:       Appearance: Normal appearance. HENT:      Head: Normocephalic and atraumatic. Mouth/Throat:      Comments: Tooth #22 does appear fractured. No erythema, swelling, or induration concerning for drainable abscess. Uvula is midline. Soft palate raises equally to phonation. No floor of mouth tenderness. No submental space tenderness. Eyes:      Extraocular Movements: Extraocular movements intact. Pupils: Pupils are equal, round, and reactive to light. Neck:      Musculoskeletal: Normal range of motion. Cardiovascular:      Rate and Rhythm: Normal rate and regular rhythm. Pulmonary:      Effort: Pulmonary effort is normal.      Breath sounds: Normal breath sounds. Abdominal:      General: Bowel sounds are normal. There is no distension. Palpations: Abdomen is soft. Tenderness: There is no abdominal tenderness. Musculoskeletal: Normal range of motion. Skin:     General: Skin is warm and dry. Neurological:      General: No focal deficit present. Mental Status: He is alert and oriented to person, place, and time. Psychiatric:         Mood and Affect: Mood normal.         Behavior: Behavior normal.         Diagnostic Results     RADIOLOGY:  No orders to display       LABS:   No results found for this visit on 09/21/20. RECENT VITALS:  BP: (!) 113/57, Temp: 98.9 °F (37.2 °C), Pulse: 74, Resp: 16,       ED Course     Nursing Notes, Past Medical Hx,Past Surgical Hx, Social Hx, Allergies, and Family Hx were reviewed. The patient was given the following medications:  No orders of the defined types were placed in this encounter. CONSULTS:  None    MEDICAL DECISION MAKING / ASSESSMENT / PLAN     Cindy Chavez is a 79 y.o. male who presents the emergency department due to concern for swallowing fractured tooth and constipation. Vital signs were stable on presentation and remained stable throughout his stay. Thorough history and physical exam was performed and as detailed above.     Patient presents the emergency department with concerns that he swallowed a fractured tooth yesterday. He states in the morning the tooth was normal, however later that night he noticed to have fractured. He does not recall specifically injuring the tooth. He is concerned that it came off while eating a sandwich earlier in the day. Denies abdominal pain, nausea, vomiting, or any foreign body sensation. Patient states he has not had a bowel movement in the last 3 days, however it is not atypical for him to go 1 to 2 weeks without having a bowel movement. On physical exam patient does have fracture to tooth #22 without surrounding erythema, induration, or fluctuance concerning for drainable abscess. Abdomen is soft and nontender throughout all quadrants. Patient was concerned about his constipation, therefore he was given an enema in the emergency department. Patient did have a large bowel movement while in the emergency department. I do not believe he needs any imaging to look for the tooth based on lack of obstructive symptoms or pain. At this time, I believe he is stable to be discharged with instructions to take his home MiraLAX as needed for any constipation. Plan was thoroughly discussed with the patient and he is agreeable. He was given strict return precautions and discharged home. This patient was also evaluated by the attending physician. All care plans were discussed and agreed upon. Clinical Impression     1.  Constipation, unspecified constipation type        Disposition     PATIENT REFERRED TO:  Solomon Tran    Schedule an appointment as soon as possible for a visit       [facility]  [address]  [address] [facility] [place] [id number] [phone]  Go to   If symptoms worsen      DISCHARGE MEDICATIONS:  New Prescriptions    No medications on file       DISPOSITION  discharge        Tj Brooks PA-C  09/21/20 3179

## 2020-09-21 NOTE — ED TRIAGE NOTES
Broke tooth off while eating yesterday and believes he swallowed it. States he'd like to know where it is in his system. Denies pain. Left lower molar partially broken off. EMS reports poor living conditions and they felt the patient wasn't being properly being cared for.

## 2020-11-03 PROBLEM — N17.9 AKI (ACUTE KIDNEY INJURY) (HCC): Status: RESOLVED | Noted: 2019-01-07 | Resolved: 2020-11-03

## 2020-11-30 RX ORDER — FUROSEMIDE 40 MG/1
40 TABLET ORAL 2 TIMES DAILY
Qty: 30 TABLET | Refills: 3 | Status: SHIPPED | OUTPATIENT
Start: 2020-11-30 | End: 2021-01-11

## 2020-12-13 ENCOUNTER — APPOINTMENT (OUTPATIENT)
Dept: CT IMAGING | Age: 80
End: 2020-12-13
Payer: MEDICARE

## 2020-12-13 ENCOUNTER — HOSPITAL ENCOUNTER (EMERGENCY)
Age: 80
Discharge: HOME | End: 2020-12-13
Attending: STUDENT IN AN ORGANIZED HEALTH CARE EDUCATION/TRAINING PROGRAM
Payer: MEDICARE

## 2020-12-13 LAB
ALBUMIN SERPL-MCNC: 4.4 G/DL (ref 3.4–5)
ALP BLD-CCNC: 282 U/L (ref 40–129)
ALT SERPL-CCNC: 17 U/L (ref 10–40)
ANION GAP SERPL CALCULATED.3IONS-SCNC: 12 MMOL/L (ref 3–16)
AST SERPL-CCNC: 22 U/L (ref 15–37)
BACTERIA: ABNORMAL /HPF
BASOPHILS ABSOLUTE: 0.1 K/UL (ref 0–0.2)
BASOPHILS RELATIVE PERCENT: 0.9 %
BILIRUB SERPL-MCNC: 0.4 MG/DL (ref 0–1)
BILIRUBIN DIRECT: <0.2 MG/DL (ref 0–0.3)
BILIRUBIN URINE: NEGATIVE
BILIRUBIN, INDIRECT: ABNORMAL MG/DL (ref 0–1)
BLOOD, URINE: NEGATIVE
BUN BLDV-MCNC: 55 MG/DL (ref 7–20)
CALCIUM SERPL-MCNC: 9.5 MG/DL (ref 8.3–10.6)
CHLORIDE BLD-SCNC: 100 MMOL/L (ref 99–110)
CLARITY: CLEAR
CO2: 27 MMOL/L (ref 21–32)
COLOR: YELLOW
CREAT SERPL-MCNC: 1.7 MG/DL (ref 0.8–1.3)
EOSINOPHILS ABSOLUTE: 0.2 K/UL (ref 0–0.6)
EOSINOPHILS RELATIVE PERCENT: 3.3 %
EPITHELIAL CELLS, UA: ABNORMAL /HPF (ref 0–5)
GFR AFRICAN AMERICAN: 47
GFR NON-AFRICAN AMERICAN: 39
GLUCOSE BLD-MCNC: 117 MG/DL (ref 70–99)
GLUCOSE URINE: NEGATIVE MG/DL
HCT VFR BLD CALC: 42.7 % (ref 40.5–52.5)
HEMOGLOBIN: 14 G/DL (ref 13.5–17.5)
KETONES, URINE: NEGATIVE MG/DL
LACTIC ACID: 1.4 MMOL/L (ref 0.4–2)
LEUKOCYTE ESTERASE, URINE: ABNORMAL
LIPASE: 28 U/L (ref 13–60)
LYMPHOCYTES ABSOLUTE: 1 K/UL (ref 1–5.1)
LYMPHOCYTES RELATIVE PERCENT: 14.7 %
MCH RBC QN AUTO: 31.5 PG (ref 26–34)
MCHC RBC AUTO-ENTMCNC: 32.8 G/DL (ref 31–36)
MCV RBC AUTO: 96.1 FL (ref 80–100)
MICROSCOPIC EXAMINATION: YES
MONOCYTES ABSOLUTE: 0.5 K/UL (ref 0–1.3)
MONOCYTES RELATIVE PERCENT: 7.1 %
NEUTROPHILS ABSOLUTE: 4.8 K/UL (ref 1.7–7.7)
NEUTROPHILS RELATIVE PERCENT: 74 %
NITRITE, URINE: NEGATIVE
PDW BLD-RTO: 14.1 % (ref 12.4–15.4)
PH UA: 6 (ref 5–8)
PLATELET # BLD: 179 K/UL (ref 135–450)
PMV BLD AUTO: 9.4 FL (ref 5–10.5)
POTASSIUM REFLEX MAGNESIUM: 4 MMOL/L (ref 3.5–5.1)
PROTEIN UA: NEGATIVE MG/DL
RBC # BLD: 4.45 M/UL (ref 4.2–5.9)
RBC UA: ABNORMAL /HPF (ref 0–4)
SODIUM BLD-SCNC: 139 MMOL/L (ref 136–145)
SPECIFIC GRAVITY UA: 1.02 (ref 1–1.03)
TOTAL PROTEIN: 8.9 G/DL (ref 6.4–8.2)
URINE TYPE: ABNORMAL
UROBILINOGEN, URINE: 0.2 E.U./DL
WBC # BLD: 6.5 K/UL (ref 4–11)
WBC UA: ABNORMAL /HPF (ref 0–5)

## 2020-12-13 PROCEDURE — 83690 ASSAY OF LIPASE: CPT

## 2020-12-13 PROCEDURE — 2580000003 HC RX 258: Performed by: NURSE PRACTITIONER

## 2020-12-13 PROCEDURE — 87186 SC STD MICRODIL/AGAR DIL: CPT

## 2020-12-13 PROCEDURE — 87077 CULTURE AEROBIC IDENTIFY: CPT

## 2020-12-13 PROCEDURE — 87086 URINE CULTURE/COLONY COUNT: CPT

## 2020-12-13 PROCEDURE — 6370000000 HC RX 637 (ALT 250 FOR IP): Performed by: STUDENT IN AN ORGANIZED HEALTH CARE EDUCATION/TRAINING PROGRAM

## 2020-12-13 PROCEDURE — 80048 BASIC METABOLIC PNL TOTAL CA: CPT

## 2020-12-13 PROCEDURE — 83605 ASSAY OF LACTIC ACID: CPT

## 2020-12-13 PROCEDURE — 99284 EMERGENCY DEPT VISIT MOD MDM: CPT

## 2020-12-13 PROCEDURE — 51702 INSERT TEMP BLADDER CATH: CPT

## 2020-12-13 PROCEDURE — 85025 COMPLETE CBC W/AUTO DIFF WBC: CPT

## 2020-12-13 PROCEDURE — 80076 HEPATIC FUNCTION PANEL: CPT

## 2020-12-13 PROCEDURE — 81001 URINALYSIS AUTO W/SCOPE: CPT

## 2020-12-13 PROCEDURE — 74176 CT ABD & PELVIS W/O CONTRAST: CPT

## 2020-12-13 PROCEDURE — 36415 COLL VENOUS BLD VENIPUNCTURE: CPT

## 2020-12-13 PROCEDURE — 6370000000 HC RX 637 (ALT 250 FOR IP): Performed by: PHYSICIAN ASSISTANT

## 2020-12-13 RX ORDER — METRONIDAZOLE 500 MG/1
500 TABLET ORAL 2 TIMES DAILY
Qty: 20 TABLET | Refills: 0 | Status: SHIPPED | OUTPATIENT
Start: 2020-12-13 | End: 2020-12-23

## 2020-12-13 RX ORDER — DOCUSATE SODIUM 100 MG/1
100 CAPSULE, LIQUID FILLED ORAL 2 TIMES DAILY
Qty: 30 CAPSULE | Refills: 0 | Status: ON HOLD | OUTPATIENT
Start: 2020-12-13 | End: 2021-06-20 | Stop reason: CLARIF

## 2020-12-13 RX ORDER — METRONIDAZOLE 500 MG/1
500 TABLET ORAL ONCE
Status: COMPLETED | OUTPATIENT
Start: 2020-12-13 | End: 2020-12-13

## 2020-12-13 RX ORDER — SODIUM CHLORIDE, SODIUM LACTATE, POTASSIUM CHLORIDE, AND CALCIUM CHLORIDE .6; .31; .03; .02 G/100ML; G/100ML; G/100ML; G/100ML
1000 INJECTION, SOLUTION INTRAVENOUS ONCE
Status: COMPLETED | OUTPATIENT
Start: 2020-12-13 | End: 2020-12-13

## 2020-12-13 RX ORDER — CIPROFLOXACIN 500 MG/1
500 TABLET, FILM COATED ORAL 2 TIMES DAILY
Qty: 20 TABLET | Refills: 0 | Status: SHIPPED | OUTPATIENT
Start: 2020-12-13 | End: 2020-12-23

## 2020-12-13 RX ORDER — CIPROFLOXACIN 500 MG/1
500 TABLET, FILM COATED ORAL ONCE
Status: COMPLETED | OUTPATIENT
Start: 2020-12-13 | End: 2020-12-13

## 2020-12-13 RX ADMIN — CIPROFLOXACIN HYDROCHLORIDE 500 MG: 500 TABLET, FILM COATED ORAL at 20:48

## 2020-12-13 RX ADMIN — SODIUM CHLORIDE, POTASSIUM CHLORIDE, SODIUM LACTATE AND CALCIUM CHLORIDE 1000 ML: 600; 310; 30; 20 INJECTION, SOLUTION INTRAVENOUS at 15:53

## 2020-12-13 RX ADMIN — MINERAL OIL 330 ML: 1000 LIQUID ORAL at 19:14

## 2020-12-13 RX ADMIN — METRONIDAZOLE 500 MG: 500 TABLET ORAL at 20:48

## 2020-12-13 NOTE — ED PROVIDER NOTES
[address] [facility] ENCOUNTER          PHYSICIAN ASSISTANT NOTE     Date of evaluation: 12/13/2020    ADDENDUM:      Care of this patient was assumed from [facility], [facility]. The patient was seen for Diarrhea and Urinary Tract Infection  . The patient's initial evaluation and plan have been discussed with the prior provider who initially evaluated the patient. Nursing Notes, Past Medical Hx, Past Surgical Hx, Social Hx, Allergies, and Family Hx were all reviewed. Diagnostic Results     RADIOLOGY:  CT ABDOMEN PELVIS WO CONTRAST Additional Contrast? None   Final Result      1. No acute intra-abdominopelvic abnormality. 2. Stable loculated small left pleural effusion. 3. Moderate to large hiatal hernia, unchanged. 4. Cholelithiasis. 5. Left kidney nonobstructive nephrolithiasis. No hydronephrosis. 6. Stable benign-appearing right renal cysts. 7. Large amount of residual fecal material in large bowel. Rectum distended with fecal material with mild wall thickening and perirectal fat stranding suggestive of stercoral colitis. Possibility of fecal impaction cannot be excluded. 8. Diffuse circumferential wall thickening of urinary bladder most likely relate to chronic outlet obstruction. Superimposed cystitis cannot be excluded. Recommended correlation with urinalysis.           LABS:   Results for orders placed or performed during the hospital encounter of 12/13/20   CBC Auto Differential   Result Value Ref Range    WBC 6.5 4.0 - 11.0 K/uL    RBC 4.45 4.20 - 5.90 M/uL    Hemoglobin 14.0 13.5 - 17.5 g/dL    Hematocrit 42.7 40.5 - 52.5 %    MCV 96.1 80.0 - 100.0 fL    MCH 31.5 26.0 - 34.0 pg    MCHC 32.8 31.0 - 36.0 g/dL    RDW 14.1 12.4 - 15.4 %    Platelets 571 502 - 482 K/uL    MPV 9.4 5.0 - 10.5 fL    Neutrophils % 74.0 %    Lymphocytes % 14.7 %    Monocytes % 7.1 %    Eosinophils % 3.3 %    Basophils % 0.9 %    Neutrophils Absolute 4.8 1.7 - 7.7 K/uL    Lymphocytes Absolute 1.0 1.0 - 5.1 K/uL    Monocytes Absolute 0.5 0.0 - 1.3 K/uL    Eosinophils Absolute 0.2 0.0 - 0.6 K/uL    Basophils Absolute 0.1 0.0 - 0.2 K/uL   Basic Metabolic Panel w/ Reflex to MG   Result Value Ref Range    Sodium 139 136 - 145 mmol/L    Potassium reflex Magnesium 4.0 3.5 - 5.1 mmol/L    Chloride 100 99 - 110 mmol/L    CO2 27 21 - 32 mmol/L    Anion Gap 12 3 - 16    Glucose 117 (H) 70 - 99 mg/dL    BUN 55 (H) 7 - 20 mg/dL    CREATININE 1.7 (H) 0.8 - 1.3 mg/dL    GFR Non-African American 39 (A) >60    GFR  47 (A) >60    Calcium 9.5 8.3 - 10.6 mg/dL   Hepatic Function Panel   Result Value Ref Range    Total Protein 8.9 (H) 6.4 - 8.2 g/dL    Alb 4.4 3.4 - 5.0 g/dL    Alkaline Phosphatase 282 (H) 40 - 129 U/L    ALT 17 10 - 40 U/L    AST 22 15 - 37 U/L    Total Bilirubin 0.4 0.0 - 1.0 mg/dL    Bilirubin, Direct <0.2 0.0 - 0.3 mg/dL    Bilirubin, Indirect see below 0.0 - 1.0 mg/dL   Lipase   Result Value Ref Range    Lipase 28.0 13.0 - 60.0 U/L   Urinalysis, reflex to microscopic   Result Value Ref Range    Color, UA Yellow Straw/Yellow    Clarity, UA Clear Clear    Glucose, Ur Negative Negative mg/dL    Bilirubin Urine Negative Negative    Ketones, Urine Negative Negative mg/dL    Specific Gravity, UA 1.020 1.005 - 1.030    Blood, Urine Negative Negative    pH, UA 6.0 5.0 - 8.0    Protein, UA Negative Negative mg/dL    Urobilinogen, Urine 0.2 <2.0 E.U./dL    Nitrite, Urine Negative Negative    Leukocyte Esterase, Urine LARGE (A) Negative    Microscopic Examination YES     Urine Type Voided    Lactic Acid, Plasma   Result Value Ref Range    Lactic Acid 1.4 0.4 - 2.0 mmol/L   Microscopic Urinalysis   Result Value Ref Range    WBC, UA  (A) 0 - 5 /HPF    RBC, UA 3-4 0 - 4 /HPF    Epithelial Cells, UA 2-5 0 - 5 /HPF    Bacteria, UA 3+ (A) None Seen /HPF       RECENT VITALS:  BP: 129/71, Temp: 97.8 °F (36.6 °C), Pulse: 74, Resp: 18, SpO2: 97 %     Procedures     n/a    ED Course     The patient was given the following medications:  Orders Placed This Encounter   Medications    lactated ringers bolus    SMOG Enema    ciprofloxacin (CIPRO) 500 MG tablet     Sig: Take 1 tablet by mouth 2 times daily for 10 days     Dispense:  20 tablet     Refill:  0    metroNIDAZOLE (FLAGYL) 500 MG tablet     Sig: Take 1 tablet by mouth 2 times daily for 10 days     Dispense:  20 tablet     Refill:  0    docusate sodium (COLACE) 100 MG capsule     Sig: Take 1 capsule by mouth 2 times daily     Dispense:  30 capsule     Refill:  0    ciprofloxacin (CIPRO) tablet 500 mg     Order Specific Question:   Antimicrobial Indications     Answer:   Urinary Tract Infection    metroNIDAZOLE (FLAGYL) tablet 500 mg     Order Specific Question:   Antimicrobial Indications     Answer:   Urinary Tract Infection       CONSULTS:  None    MEDICAL DECISION MAKING / ASSESSMENT / PLAN     Sheldon Gore is a 79 y.o. male 3 weeks of nonbloody, nonblack diarrhea, as well as 3 days of pain around his Schneider site. Typically when he has pain around the site it indicates that he has a urinary tract infection. He also has noticed some abdominal bloating and fullness, and on physical exam was noted to have significant tenderness palpation inferior to his umbilicus. This patient was also evaluated by the attending physician. All care plans were discussed and agreed upon. Pending: UA, schneider change, labs, CT abd    After turnover:     Patient's urinalysis with evidence of infection. His labs also show a mild ALIS for which he was given IV fluids. He has a normal white blood cell count and lab work is otherwise unremarkable. CT scan of patient's abdomen shows concern for acute cystitis and potentially a colitis related to a fecal impaction. My attending did attempt a disimpaction which was unsuccessful, for at which time the patient was given a smog enema with significant success in regards to him having a bowel movement and with symptom control. The patient lives at home with his daughter, who he feels comfortable returning to. I do not feel that he requires admission to the hospital for IV antibiotics as he is hemodynamically stable without any systemic signs of sepsis. He will be placed on Colace for stool softener. He will be placed on ciprofloxacin and Flagyl per pharmacy recommendations for coverage of his potential colitis and urinary tract infection. He was given strict return precautions, and advised to follow-up closely with his primary care provider. He was able to eat and drink prior to being discharged from the hospital.    Clinical Impression     1. Urinary tract infection without hematuria, site unspecified    2.  Colitis        Disposition     PATIENT REFERRED TO:  Oswald Tran    Schedule an appointment as soon as possible for a visit         DISCHARGE MEDICATIONS:  New Prescriptions    CIPROFLOXACIN (CIPRO) 500 MG TABLET    Take 1 tablet by mouth 2 times daily for 10 days    DOCUSATE SODIUM (COLACE) 100 MG CAPSULE    Take 1 capsule by mouth 2 times daily    METRONIDAZOLE (FLAGYL) 500 MG TABLET    Take 1 tablet by mouth 2 times daily for 10 days       DISPOSITION Discharge - Pending Orders Complete 12/13/2020 08:34:28 PM         Pearl Gunn [place]  12/13/20 2101

## 2020-12-13 NOTE — ED PROVIDER NOTES
[address] [facility]  EMERGENCY DEPARTMENT ENCOUNTER          NURSE PRACTITIONER NOTE     Date of evaluation: 12/13/2020    Chief Complaint     Diarrhea and Urinary Tract Infection    History of Present Illness     Dionne Pollack is a 79 y.o. male medical history as indicated below, with BPH, with chronic indwelling Iglesias, MDRO UTIs, pretension, hyperlipidemia, baclofen pump for spasticity who presents to the emergency department with multiple complaints. He has had 3 weeks of frequent, nonbloody, nondark tarry diarrhea. For the last 3 days, he has had some irritation of the tip of his penis where his Iglesias inserts, he states that usually when he gets this, it is indicative of urinary tract infection. He is also noticed increased cloudiness cloudiness and sediment of his urine. He reports some nausea without vomiting. He denies flank tenderness. He complains of some generalized abdominal bloating. He denies fevers. He denies shortness of breath, cough, or exposure to known Covid positive individuals. With the exception of the above, there are no aggravating or alleviating factors.     Review of Systems     Pertinent positive and negative findings as documented above in HPI, otherwise all other systems were reviewed and negative     Past Medical, Surgical, Family, and Social History     Medical History:   Past Medical History:   Diagnosis Date    BPH (benign prostatic hyperplasia)     Carotid stenosis 12/2013    JESU 66-04% stenosis; LICA 97-80% stenosis    Cellulitis 12/2013    LLE    Chronic back pain     Diverticular disease     Erectile dysfunction     GERD (gastroesophageal reflux disease)     History of atrial fibrillation     Hypercholesteremia     Hypertension     Lower GI bleed     MDRO (multiple drug resistant organisms) resistance 10/26/2019    urine    Neuromuscular disorder (HCC)     spasticity    Renal insufficiency     Risk for falls     uses walker    Tinea corporis 12/2013 LLE    Vitamin B12 deficiency        Surgical History:   Past Surgical History:   Procedure Laterality Date    BACK SURGERY  2006    lower lumbar    CORONARY ARTERY BYPASS GRAFT  12/13/2013    CABG x 5 (Dr Erica Villaesnor), svg to diag, om1 and 3, distal rca, kelly to lad.  CYSTOSCOPY N/A 1/10/2019    CYSTOSCOPY performed by Milo Thomas MD at [address] Right 5/1/2015    ORIF    SIGMOIDOSCOPY N/A 6/11/2020    SIGMOIDOSCOPY DIAGNOSTIC FLEXIBLE performed by Isaac Ozuna MD at [address] [facility] 5/4/2020    EGD BIOPSY performed by Isaac Ozuna MD at [address] [facility] History: He reports that he quit smoking about 51 years ago. He has never used smokeless tobacco. He reports that he does not drink alcohol or use drugs. Family History: His family history includes Heart Disease in his father. Medications     Previous Medications    ASPIRIN 81 MG CHEWABLE TABLET    Take 1 tablet by mouth daily    ATORVASTATIN (LIPITOR) 80 MG TABLET    Take 80 mg by mouth nightly. BALSAM PERU-CASTOR OIL (VENELEX) OINT OINTMENT    Apply topically 2 times daily    FUROSEMIDE (LASIX) 40 MG TABLET    Take 1 tablet by mouth 2 times daily    MELATONIN 10 MG TABS    Take 10 mg by mouth nightly as needed     PANTOPRAZOLE (PROTONIX) 40 MG TABLET    Take 1 tablet by mouth every morning (before breakfast)    POTASSIUM CHLORIDE (KLOR-CON M) 20 MEQ EXTENDED RELEASE TABLET    Take 1 tablet by mouth daily    TAMSULOSIN (FLOMAX) 0.4 MG CAPSULE    Take 0.8 mg by mouth daily     VITAMIN B-12 (CYANOCOBALAMIN) 1000 MCG TABLET    Take 1,000 mcg by mouth daily    ZOLPIDEM (AMBIEN) 10 MG TABLET    Take 10 mg by mouth nightly as needed for Sleep. Allergies     Allergies:    Allergies as of 12/13/2020 - Review Complete 12/13/2020   Allergen Reaction Noted    Bactrim [sulfamethoxazole-trimethoprim] Rash 12/08/2013        Physical Exam     INITIAL VITALS: BP: 129/71, Temp: 97.8 °F (36.6 °C), Pulse: 74, Resp: 18, SpO2: 97 %     General: 79 y.o. male in no apparent distress, well developed, well nourished, non-toxic appearance. HEENT: Atraumatic, normocephalic. EOMs intact. Neck:  Full range of motion. Chest/pulm: Respiratory rate normal. Speaks in complete sentences, no respiratory distress, lungs CTA bilaterally, no wheezes, rales, rhonchi. Cardiovascular: Heart rate normal. RRR, no murmurs, rubs, gallops     Abdomen: No gross distention, baclofen pump in the right mid abdomen. There is significant tenderness inferior to the umbilicus without peritonitis, no CVA tenderness    : There is no significant irritation or erythema at the tip of the glans with a Iglesias inserts. There is some cloudiness and sediment in the Iglesias bag    Musculoskeletal: No obvious joint deformity. Ambulates without difficulty. Neuro: A&O x 4. Normal speech without dysarthria or aphasia. Moves all extremities spontaneously and symmetrically. Gait normal without ataxia. Skin: Warm, dry. No obvious rashes, petechiae, or purpura. Psych: Appropriate mood and affect, normal interaction.      Diagnostic Results     RADIOLOGY:  CT ABDOMEN PELVIS W IV CONTRAST Additional Contrast? None    (Results Pending)       LABS:   Results for orders placed or performed during the hospital encounter of 12/13/20   CBC Auto Differential   Result Value Ref Range    WBC 6.5 4.0 - 11.0 K/uL    RBC 4.45 4.20 - 5.90 M/uL    Hemoglobin 14.0 13.5 - 17.5 g/dL    Hematocrit 42.7 40.5 - 52.5 %    MCV 96.1 80.0 - 100.0 fL    MCH 31.5 26.0 - 34.0 pg    MCHC 32.8 31.0 - 36.0 g/dL    RDW 14.1 12.4 - 15.4 %    Platelets 683 233 - 086 K/uL    MPV 9.4 5.0 - 10.5 fL    Neutrophils % 74.0 %    Lymphocytes % 14.7 %    Monocytes % 7.1 %    Eosinophils % 3.3 %    Basophils % 0.9 %    Neutrophils Absolute 4.8 1.7 - 7.7 K/uL    Lymphocytes Absolute 1.0 1.0 - 5.1 K/uL    Monocytes Absolute 0.5 0.0 - 1.3 K/uL    Eosinophils Absolute 0.2 0.0 - 0.6 K/uL    Basophils Absolute 0.1 0.0 - 0.2 K/uL   Basic Metabolic Panel w/ Reflex to MG   Result Value Ref Range    Sodium 139 136 - 145 mmol/L    Potassium reflex Magnesium 4.0 3.5 - 5.1 mmol/L    Chloride 100 99 - 110 mmol/L    CO2 27 21 - 32 mmol/L    Anion Gap 12 3 - 16    Glucose 117 (H) 70 - 99 mg/dL    BUN 55 (H) 7 - 20 mg/dL    CREATININE 1.7 (H) 0.8 - 1.3 mg/dL    GFR Non-African American 39 (A) >60    GFR  47 (A) >60    Calcium 9.5 8.3 - 10.6 mg/dL   Hepatic Function Panel   Result Value Ref Range    Total Protein 8.9 (H) 6.4 - 8.2 g/dL    Alb 4.4 3.4 - 5.0 g/dL    Alkaline Phosphatase 282 (H) 40 - 129 U/L    ALT 17 10 - 40 U/L    AST 22 15 - 37 U/L    Total Bilirubin 0.4 0.0 - 1.0 mg/dL    Bilirubin, Direct <0.2 0.0 - 0.3 mg/dL    Bilirubin, Indirect see below 0.0 - 1.0 mg/dL   Lipase   Result Value Ref Range    Lipase 28.0 13.0 - 60.0 U/L   Lactic Acid, Plasma   Result Value Ref Range    Lactic Acid 1.4 0.4 - 2.0 mmol/L       RECENT VITALS:  BP: 129/71, Temp: 97.8 °F (36.6 °C), Pulse: 74, Resp: 18, SpO2: 97 %     Procedures     None     ED Course     Nursing Notes, Past Medical Hx, Past Surgical Hx, Social Hx, Allergies, and Family Hx were reviewed. The patient was given the following medications:  No orders of the defined types were placed in this encounter. CONSULTS:  None    MEDICAL DECISION MAKING / ASSESSMENT / PLAN     Briefly, Lanice Bence is a 79 y.o. male who presented to the emergency department with abdominal pain, 3 weeks of diarrhea, and 3 days of increased penile irritation. On presentation, is chronically ill-appearing, no acute distress. He is afebrile with stable vital signs. On exam, he has fairly significant tenderness in the mid abdomen inferior to the umbilicus concerning for possible intra-abdominal process. Urine is somewhat cloudy and may potentially be indicative of urinary tract infection.   Laboratory studies including BMP, CBC, LFTs, lipase, urinalysis, urine culture were sent. CT abdomen and pelvis with IV contrast was ordered. At this time I am going off service and will be signing out care of the patient to my colleague Alex Massey PA-C for further care. Labs, CT scan is/are still pending. Oncoming provider responsibilities include following up on pending labs/tests, reassessing patient, and appropriate disposition. Please see medical record for further management and medical decision making. The patient was evaluated by myself and the ED Attending Physician, Dr. Lucho Thompson All management and disposition plans were discussed and agreed upon.        Deberah Osgood, CNP  Department of Emergency Medicine     Deberah Osgood, APRN - [facility]  12/13/20 1063

## 2020-12-13 NOTE — ED NOTES
Pt schneider cath removed and replaced with new 16 fr schneider cath.      Bashir Márquez RN  12/13/20 4246

## 2020-12-14 NOTE — ED PROVIDER NOTES
ED Attending Attestation Note     Date of evaluation: 12/13/2020    This patient was seen by the advance practice provider. I have seen and examined the patient, agree with the workup, evaluation, management and diagnosis. The care plan has been discussed. My assessment reveals 80 y/o M with hx of BPH, chronic indwelling schneider, HTN, recurrent UTIs presents for diarrhea and abd pain. VSS and afebrile, nontoxic appearing. Abd with generalized tenderness, but no rebound or guarding. Labs negative for leukocytosis, lactate wnl. CT abd/pelvis with incidental gallstones and renal stone, but notable for large amount of residual fecal matter and area of bowel thickening concerning for colitis. UA suggestive of infection. Mild amount of stool removed on manual disimpaction. SMOG enema brought significant relief and patient had BM in the ED. Will treat with cipro/flagyl for UTI and colitis. Patient otherwise looks systemically well and does not warrant inpatient admission at this time. Follow up with PCP. ED precautions for worsening symptoms.      Yordy Stubbs MD  12/13/20 [id number]

## 2020-12-15 LAB
ORGANISM: ABNORMAL
ORGANISM: ABNORMAL
URINE CULTURE, ROUTINE: ABNORMAL
URINE CULTURE, ROUTINE: ABNORMAL

## 2020-12-17 VITALS
TEMPERATURE: 97.8 F | RESPIRATION RATE: 18 BRPM | OXYGEN SATURATION: 97 % | DIASTOLIC BLOOD PRESSURE: 71 MMHG | HEIGHT: 71 IN | SYSTOLIC BLOOD PRESSURE: 129 MMHG | BODY MASS INDEX: 27.72 KG/M2 | WEIGHT: 198 LBS | HEART RATE: 74 BPM

## 2021-01-11 RX ORDER — FUROSEMIDE 40 MG/1
TABLET ORAL
Qty: 120 TABLET | Refills: 2 | Status: ON HOLD | OUTPATIENT
Start: 2021-01-11 | End: 2021-04-24 | Stop reason: SDUPTHER

## 2021-02-14 ENCOUNTER — APPOINTMENT (OUTPATIENT)
Dept: CT IMAGING | Age: 81
DRG: 698 | End: 2021-02-14
Payer: MEDICARE

## 2021-02-14 ENCOUNTER — HOSPITAL ENCOUNTER (INPATIENT)
Age: 81
LOS: 5 days | Discharge: NURSING, REHAB OR LONG-TERM CARE | DRG: 698 | End: 2021-02-19
Attending: EMERGENCY MEDICINE | Admitting: INTERNAL MEDICINE
Payer: MEDICARE

## 2021-02-14 DIAGNOSIS — R31.9 URINARY TRACT INFECTION WITH HEMATURIA, SITE UNSPECIFIED: Primary | ICD-10-CM

## 2021-02-14 DIAGNOSIS — N39.0 URINARY TRACT INFECTION WITH HEMATURIA, SITE UNSPECIFIED: Primary | ICD-10-CM

## 2021-02-14 DIAGNOSIS — N49.2 CELLULITIS OF SCROTUM: ICD-10-CM

## 2021-02-14 DIAGNOSIS — N17.9 AKI (ACUTE KIDNEY INJURY) (HCC): ICD-10-CM

## 2021-02-14 PROBLEM — N30.01 ACUTE CYSTITIS WITH HEMATURIA: Status: ACTIVE | Noted: 2021-02-14

## 2021-02-14 LAB
ANION GAP SERPL CALCULATED.3IONS-SCNC: 11 MMOL/L (ref 3–16)
APTT: 41.4 SEC (ref 24.2–36.2)
BACTERIA: ABNORMAL /HPF
BASOPHILS ABSOLUTE: 0 K/UL (ref 0–0.2)
BASOPHILS RELATIVE PERCENT: 0.2 %
BILIRUBIN URINE: NEGATIVE
BLOOD, URINE: ABNORMAL
BUN BLDV-MCNC: 56 MG/DL (ref 7–20)
CALCIUM SERPL-MCNC: 9.1 MG/DL (ref 8.3–10.6)
CHLORIDE BLD-SCNC: 99 MMOL/L (ref 99–110)
CLARITY: CLEAR
CO2: 26 MMOL/L (ref 21–32)
COLOR: YELLOW
CREAT SERPL-MCNC: 2 MG/DL (ref 0.8–1.3)
EOSINOPHILS ABSOLUTE: 0.2 K/UL (ref 0–0.6)
EOSINOPHILS RELATIVE PERCENT: 1.8 %
EPITHELIAL CELLS, UA: ABNORMAL /HPF (ref 0–5)
GFR AFRICAN AMERICAN: 39
GFR NON-AFRICAN AMERICAN: 32
GLUCOSE BLD-MCNC: 130 MG/DL (ref 70–99)
GLUCOSE URINE: NEGATIVE MG/DL
HCT VFR BLD CALC: 35.8 % (ref 40.5–52.5)
HEMOGLOBIN: 11.6 G/DL (ref 13.5–17.5)
INR BLD: 1.21 (ref 0.86–1.14)
KETONES, URINE: NEGATIVE MG/DL
LACTIC ACID: 1.3 MMOL/L (ref 0.4–2)
LEUKOCYTE ESTERASE, URINE: ABNORMAL
LYMPHOCYTES ABSOLUTE: 0.6 K/UL (ref 1–5.1)
LYMPHOCYTES RELATIVE PERCENT: 4.9 %
MCH RBC QN AUTO: 31 PG (ref 26–34)
MCHC RBC AUTO-ENTMCNC: 32.4 G/DL (ref 31–36)
MCV RBC AUTO: 95.7 FL (ref 80–100)
MICROSCOPIC EXAMINATION: YES
MONOCYTES ABSOLUTE: 0.7 K/UL (ref 0–1.3)
MONOCYTES RELATIVE PERCENT: 6.5 %
NEUTROPHILS ABSOLUTE: 9.7 K/UL (ref 1.7–7.7)
NEUTROPHILS RELATIVE PERCENT: 86.6 %
NITRITE, URINE: POSITIVE
PDW BLD-RTO: 16.3 % (ref 12.4–15.4)
PH UA: 5.5 (ref 5–8)
PLATELET # BLD: 137 K/UL (ref 135–450)
PMV BLD AUTO: 8.8 FL (ref 5–10.5)
POTASSIUM REFLEX MAGNESIUM: 3.9 MMOL/L (ref 3.5–5.1)
PROTEIN UA: NEGATIVE MG/DL
PROTHROMBIN TIME: 14.1 SEC (ref 10–13.2)
RBC # BLD: 3.74 M/UL (ref 4.2–5.9)
RBC UA: ABNORMAL /HPF (ref 0–4)
SODIUM BLD-SCNC: 136 MMOL/L (ref 136–145)
SPECIFIC GRAVITY UA: 1.02 (ref 1–1.03)
URINE TYPE: ABNORMAL
UROBILINOGEN, URINE: 0.2 E.U./DL
WBC # BLD: 11.2 K/UL (ref 4–11)
WBC UA: ABNORMAL /HPF (ref 0–5)

## 2021-02-14 PROCEDURE — 87086 URINE CULTURE/COLONY COUNT: CPT

## 2021-02-14 PROCEDURE — 83605 ASSAY OF LACTIC ACID: CPT

## 2021-02-14 PROCEDURE — 87077 CULTURE AEROBIC IDENTIFY: CPT

## 2021-02-14 PROCEDURE — 6360000002 HC RX W HCPCS: Performed by: EMERGENCY MEDICINE

## 2021-02-14 PROCEDURE — 51702 INSERT TEMP BLADDER CATH: CPT

## 2021-02-14 PROCEDURE — 96374 THER/PROPH/DIAG INJ IV PUSH: CPT

## 2021-02-14 PROCEDURE — 1200000000 HC SEMI PRIVATE

## 2021-02-14 PROCEDURE — 81001 URINALYSIS AUTO W/SCOPE: CPT

## 2021-02-14 PROCEDURE — 87040 BLOOD CULTURE FOR BACTERIA: CPT

## 2021-02-14 PROCEDURE — 74176 CT ABD & PELVIS W/O CONTRAST: CPT

## 2021-02-14 PROCEDURE — 99284 EMERGENCY DEPT VISIT MOD MDM: CPT

## 2021-02-14 PROCEDURE — 2580000003 HC RX 258: Performed by: EMERGENCY MEDICINE

## 2021-02-14 PROCEDURE — 87186 SC STD MICRODIL/AGAR DIL: CPT

## 2021-02-14 PROCEDURE — 85730 THROMBOPLASTIN TIME PARTIAL: CPT

## 2021-02-14 PROCEDURE — 96361 HYDRATE IV INFUSION ADD-ON: CPT

## 2021-02-14 PROCEDURE — 85610 PROTHROMBIN TIME: CPT

## 2021-02-14 PROCEDURE — 80048 BASIC METABOLIC PNL TOTAL CA: CPT

## 2021-02-14 PROCEDURE — 85025 COMPLETE CBC W/AUTO DIFF WBC: CPT

## 2021-02-14 RX ORDER — SODIUM CHLORIDE, SODIUM LACTATE, POTASSIUM CHLORIDE, CALCIUM CHLORIDE 600; 310; 30; 20 MG/100ML; MG/100ML; MG/100ML; MG/100ML
1000 INJECTION, SOLUTION INTRAVENOUS ONCE
Status: COMPLETED | OUTPATIENT
Start: 2021-02-14 | End: 2021-02-14

## 2021-02-14 RX ADMIN — SODIUM CHLORIDE, POTASSIUM CHLORIDE, SODIUM LACTATE AND CALCIUM CHLORIDE 1000 ML: 600; 310; 30; 20 INJECTION, SOLUTION INTRAVENOUS at 21:48

## 2021-02-14 RX ADMIN — SODIUM CHLORIDE, POTASSIUM CHLORIDE, SODIUM LACTATE AND CALCIUM CHLORIDE 1000 ML: 600; 310; 30; 20 INJECTION, SOLUTION INTRAVENOUS at 19:39

## 2021-02-14 RX ADMIN — CEFEPIME HYDROCHLORIDE 1000 MG: 1 INJECTION, POWDER, FOR SOLUTION INTRAMUSCULAR; INTRAVENOUS at 23:27

## 2021-02-14 NOTE — ED NOTES
Bed: A11-11  Expected date:   Expected time:   Means of arrival:   Comments:  2921 Sirena Phelan RN  02/14/21 [id number]

## 2021-02-15 ENCOUNTER — APPOINTMENT (OUTPATIENT)
Dept: GENERAL RADIOLOGY | Age: 81
DRG: 698 | End: 2021-02-15
Payer: MEDICARE

## 2021-02-15 PROBLEM — T83.511A URINARY TRACT INFECTION ASSOCIATED WITH INDWELLING URETHRAL CATHETER (HCC): Status: ACTIVE | Noted: 2021-02-15

## 2021-02-15 PROBLEM — N18.30 ACUTE RENAL FAILURE SUPERIMPOSED ON STAGE 3 CHRONIC KIDNEY DISEASE (HCC): Status: ACTIVE | Noted: 2021-02-15

## 2021-02-15 PROBLEM — N39.0 URINARY TRACT INFECTION ASSOCIATED WITH INDWELLING URETHRAL CATHETER (HCC): Status: ACTIVE | Noted: 2021-02-15

## 2021-02-15 PROBLEM — N17.9 ACUTE RENAL FAILURE SUPERIMPOSED ON STAGE 3 CHRONIC KIDNEY DISEASE (HCC): Status: ACTIVE | Noted: 2021-02-15

## 2021-02-15 LAB
ANION GAP SERPL CALCULATED.3IONS-SCNC: 10 MMOL/L (ref 3–16)
BASOPHILS ABSOLUTE: 0 K/UL (ref 0–0.2)
BASOPHILS RELATIVE PERCENT: 0.4 %
BUN BLDV-MCNC: 54 MG/DL (ref 7–20)
C-REACTIVE PROTEIN: 151.2 MG/L (ref 0–5.1)
CALCIUM SERPL-MCNC: 8.6 MG/DL (ref 8.3–10.6)
CHLORIDE BLD-SCNC: 104 MMOL/L (ref 99–110)
CO2: 24 MMOL/L (ref 21–32)
CREAT SERPL-MCNC: 2 MG/DL (ref 0.8–1.3)
EOSINOPHILS ABSOLUTE: 0.3 K/UL (ref 0–0.6)
EOSINOPHILS RELATIVE PERCENT: 4.1 %
GFR AFRICAN AMERICAN: 39
GFR NON-AFRICAN AMERICAN: 32
GLUCOSE BLD-MCNC: 134 MG/DL (ref 70–99)
HCT VFR BLD CALC: 31.5 % (ref 40.5–52.5)
HEMOGLOBIN: 10.3 G/DL (ref 13.5–17.5)
LYMPHOCYTES ABSOLUTE: 0.4 K/UL (ref 1–5.1)
LYMPHOCYTES RELATIVE PERCENT: 5.2 %
MCH RBC QN AUTO: 31.1 PG (ref 26–34)
MCHC RBC AUTO-ENTMCNC: 32.6 G/DL (ref 31–36)
MCV RBC AUTO: 95.3 FL (ref 80–100)
MONOCYTES ABSOLUTE: 0.5 K/UL (ref 0–1.3)
MONOCYTES RELATIVE PERCENT: 5.5 %
NEUTROPHILS ABSOLUTE: 7.2 K/UL (ref 1.7–7.7)
NEUTROPHILS RELATIVE PERCENT: 84.8 %
PDW BLD-RTO: 16 % (ref 12.4–15.4)
PLATELET # BLD: 121 K/UL (ref 135–450)
PMV BLD AUTO: 9.2 FL (ref 5–10.5)
POTASSIUM REFLEX MAGNESIUM: 4 MMOL/L (ref 3.5–5.1)
PROCALCITONIN: 3.14 NG/ML (ref 0–0.15)
RBC # BLD: 3.31 M/UL (ref 4.2–5.9)
SODIUM BLD-SCNC: 138 MMOL/L (ref 136–145)
VANCOMYCIN TROUGH: 13.3 UG/ML (ref 10–20)
WBC # BLD: 8.5 K/UL (ref 4–11)

## 2021-02-15 PROCEDURE — 2580000003 HC RX 258: Performed by: INTERNAL MEDICINE

## 2021-02-15 PROCEDURE — 6370000000 HC RX 637 (ALT 250 FOR IP): Performed by: INTERNAL MEDICINE

## 2021-02-15 PROCEDURE — 71045 X-RAY EXAM CHEST 1 VIEW: CPT

## 2021-02-15 PROCEDURE — 6360000002 HC RX W HCPCS: Performed by: INTERNAL MEDICINE

## 2021-02-15 PROCEDURE — 86140 C-REACTIVE PROTEIN: CPT

## 2021-02-15 PROCEDURE — 94761 N-INVAS EAR/PLS OXIMETRY MLT: CPT

## 2021-02-15 PROCEDURE — 84145 PROCALCITONIN (PCT): CPT

## 2021-02-15 PROCEDURE — 2580000003 HC RX 258: Performed by: EMERGENCY MEDICINE

## 2021-02-15 PROCEDURE — 80048 BASIC METABOLIC PNL TOTAL CA: CPT

## 2021-02-15 PROCEDURE — 85025 COMPLETE CBC W/AUTO DIFF WBC: CPT

## 2021-02-15 PROCEDURE — 6360000002 HC RX W HCPCS: Performed by: EMERGENCY MEDICINE

## 2021-02-15 PROCEDURE — 92610 EVALUATE SWALLOWING FUNCTION: CPT | Performed by: SPEECH-LANGUAGE PATHOLOGIST

## 2021-02-15 PROCEDURE — 80202 ASSAY OF VANCOMYCIN: CPT

## 2021-02-15 PROCEDURE — 1200000000 HC SEMI PRIVATE

## 2021-02-15 PROCEDURE — 36415 COLL VENOUS BLD VENIPUNCTURE: CPT

## 2021-02-15 PROCEDURE — 2700000000 HC OXYGEN THERAPY PER DAY

## 2021-02-15 RX ORDER — GABAPENTIN 600 MG/1
600 TABLET ORAL 2 TIMES DAILY
COMMUNITY
Start: 2021-01-28 | End: 2021-04-26

## 2021-02-15 RX ORDER — CASTOR OIL AND BALSAM, PERU 788; 87 MG/G; MG/G
OINTMENT TOPICAL 2 TIMES DAILY
Status: DISCONTINUED | OUTPATIENT
Start: 2021-02-15 | End: 2021-02-19 | Stop reason: HOSPADM

## 2021-02-15 RX ORDER — BACITRACIN 500 [USP'U]/G
OINTMENT TOPICAL 4 TIMES DAILY PRN
Status: DISCONTINUED | OUTPATIENT
Start: 2021-02-15 | End: 2021-02-15

## 2021-02-15 RX ORDER — ATORVASTATIN CALCIUM 80 MG/1
80 TABLET, FILM COATED ORAL NIGHTLY
Status: DISCONTINUED | OUTPATIENT
Start: 2021-02-15 | End: 2021-02-19 | Stop reason: HOSPADM

## 2021-02-15 RX ORDER — MECOBALAMIN 5000 MCG
10 TABLET,DISINTEGRATING ORAL NIGHTLY PRN
Status: DISCONTINUED | OUTPATIENT
Start: 2021-02-15 | End: 2021-02-19 | Stop reason: HOSPADM

## 2021-02-15 RX ORDER — ZOLPIDEM TARTRATE 5 MG/1
10 TABLET ORAL NIGHTLY PRN
Status: DISCONTINUED | OUTPATIENT
Start: 2021-02-15 | End: 2021-02-19 | Stop reason: HOSPADM

## 2021-02-15 RX ORDER — GABAPENTIN 400 MG/1
400 CAPSULE ORAL 2 TIMES DAILY
Status: DISCONTINUED | OUTPATIENT
Start: 2021-02-15 | End: 2021-02-19

## 2021-02-15 RX ORDER — PANTOPRAZOLE SODIUM 40 MG/1
40 TABLET, DELAYED RELEASE ORAL
Status: DISCONTINUED | OUTPATIENT
Start: 2021-02-15 | End: 2021-02-19 | Stop reason: HOSPADM

## 2021-02-15 RX ORDER — ACETAMINOPHEN 325 MG/1
650 TABLET ORAL EVERY 4 HOURS PRN
Status: DISCONTINUED | OUTPATIENT
Start: 2021-02-15 | End: 2021-02-19 | Stop reason: HOSPADM

## 2021-02-15 RX ORDER — ACETAMINOPHEN 650 MG/1
650 SUPPOSITORY RECTAL EVERY 4 HOURS PRN
Status: DISCONTINUED | OUTPATIENT
Start: 2021-02-15 | End: 2021-02-19 | Stop reason: HOSPADM

## 2021-02-15 RX ORDER — DICYCLOMINE HCL 20 MG
20 TABLET ORAL 3 TIMES DAILY PRN
COMMUNITY
Start: 2020-12-03

## 2021-02-15 RX ORDER — POLYETHYLENE GLYCOL 3350 17 G/17G
17 POWDER, FOR SOLUTION ORAL DAILY PRN
Status: DISCONTINUED | OUTPATIENT
Start: 2021-02-15 | End: 2021-02-19 | Stop reason: HOSPADM

## 2021-02-15 RX ORDER — HEPARIN SODIUM 5000 [USP'U]/ML
5000 INJECTION, SOLUTION INTRAVENOUS; SUBCUTANEOUS EVERY 8 HOURS SCHEDULED
Status: DISCONTINUED | OUTPATIENT
Start: 2021-02-15 | End: 2021-02-19 | Stop reason: HOSPADM

## 2021-02-15 RX ORDER — TAMSULOSIN HYDROCHLORIDE 0.4 MG/1
0.8 CAPSULE ORAL DAILY
Status: DISCONTINUED | OUTPATIENT
Start: 2021-02-15 | End: 2021-02-19 | Stop reason: HOSPADM

## 2021-02-15 RX ORDER — ONDANSETRON 2 MG/ML
4 INJECTION INTRAMUSCULAR; INTRAVENOUS EVERY 4 HOURS PRN
Status: DISCONTINUED | OUTPATIENT
Start: 2021-02-15 | End: 2021-02-19 | Stop reason: HOSPADM

## 2021-02-15 RX ORDER — SODIUM CHLORIDE 9 MG/ML
INJECTION, SOLUTION INTRAVENOUS CONTINUOUS
Status: ACTIVE | OUTPATIENT
Start: 2021-02-15 | End: 2021-02-15

## 2021-02-15 RX ORDER — DOCUSATE SODIUM 100 MG/1
100 CAPSULE, LIQUID FILLED ORAL 2 TIMES DAILY
Status: DISCONTINUED | OUTPATIENT
Start: 2021-02-15 | End: 2021-02-19 | Stop reason: HOSPADM

## 2021-02-15 RX ORDER — ASPIRIN 81 MG/1
81 TABLET, CHEWABLE ORAL DAILY
Status: DISCONTINUED | OUTPATIENT
Start: 2021-02-15 | End: 2021-02-19 | Stop reason: HOSPADM

## 2021-02-15 RX ORDER — SODIUM CHLORIDE 0.9 % (FLUSH) 0.9 %
10 SYRINGE (ML) INJECTION EVERY 12 HOURS SCHEDULED
Status: DISCONTINUED | OUTPATIENT
Start: 2021-02-15 | End: 2021-02-19 | Stop reason: HOSPADM

## 2021-02-15 RX ORDER — SODIUM CHLORIDE 0.9 % (FLUSH) 0.9 %
10 SYRINGE (ML) INJECTION PRN
Status: DISCONTINUED | OUTPATIENT
Start: 2021-02-15 | End: 2021-02-19 | Stop reason: HOSPADM

## 2021-02-15 RX ADMIN — ATORVASTATIN CALCIUM 80 MG: 80 TABLET, FILM COATED ORAL at 20:01

## 2021-02-15 RX ADMIN — ASPIRIN 81 MG: 81 TABLET, CHEWABLE ORAL at 09:14

## 2021-02-15 RX ADMIN — VANCOMYCIN HYDROCHLORIDE 1250 MG: 10 INJECTION, POWDER, LYOPHILIZED, FOR SOLUTION INTRAVENOUS at 01:56

## 2021-02-15 RX ADMIN — ZINC OXIDE: 200 OINTMENT TOPICAL at 02:13

## 2021-02-15 RX ADMIN — ATORVASTATIN CALCIUM 80 MG: 80 TABLET, FILM COATED ORAL at 02:11

## 2021-02-15 RX ADMIN — HEPARIN SODIUM 5000 UNITS: 5000 INJECTION INTRAVENOUS; SUBCUTANEOUS at 06:05

## 2021-02-15 RX ADMIN — PANTOPRAZOLE SODIUM 40 MG: 40 TABLET, DELAYED RELEASE ORAL at 06:05

## 2021-02-15 RX ADMIN — TAMSULOSIN HYDROCHLORIDE 0.8 MG: 0.4 CAPSULE ORAL at 09:14

## 2021-02-15 RX ADMIN — CEFEPIME HYDROCHLORIDE 1000 MG: 1 INJECTION, POWDER, FOR SOLUTION INTRAMUSCULAR; INTRAVENOUS at 12:10

## 2021-02-15 RX ADMIN — ACETAMINOPHEN 650 MG: 325 TABLET ORAL at 15:09

## 2021-02-15 RX ADMIN — VANCOMYCIN HYDROCHLORIDE 750 MG: 10 INJECTION, POWDER, LYOPHILIZED, FOR SOLUTION INTRAVENOUS at 14:11

## 2021-02-15 RX ADMIN — DOCUSATE SODIUM 100 MG: 100 CAPSULE, LIQUID FILLED ORAL at 09:14

## 2021-02-15 RX ADMIN — HEPARIN SODIUM 5000 UNITS: 5000 INJECTION INTRAVENOUS; SUBCUTANEOUS at 15:08

## 2021-02-15 RX ADMIN — DOCUSATE SODIUM 100 MG: 100 CAPSULE, LIQUID FILLED ORAL at 20:01

## 2021-02-15 RX ADMIN — CASTOR OIL AND BALSAM, PERU: 788; 87 OINTMENT TOPICAL at 20:01

## 2021-02-15 RX ADMIN — Medication 10 ML: at 20:02

## 2021-02-15 RX ADMIN — CASTOR OIL AND BALSAM, PERU: 788; 87 OINTMENT TOPICAL at 12:10

## 2021-02-15 RX ADMIN — HEPARIN SODIUM 5000 UNITS: 5000 INJECTION INTRAVENOUS; SUBCUTANEOUS at 21:32

## 2021-02-15 RX ADMIN — SODIUM CHLORIDE: 9 INJECTION, SOLUTION INTRAVENOUS at 01:57

## 2021-02-15 ASSESSMENT — PAIN SCALES - GENERAL
PAINLEVEL_OUTOF10: 2
PAINLEVEL_OUTOF10: 0

## 2021-02-15 NOTE — PROGRESS NOTES
NUTRITION ASSESSMENT  Admission Date: 2/14/2021     Type and Reason for Visit: Positive Nutrition Screen, Initial    NUTRITION RECOMMENDATIONS:   1. NPO per MD   2. Offer Ensure BID w/diet advancement     NUTRITION ASSESSMENT:  + screen for wt loss and decreased appetite. Pt admitted for bleeding after he ripped out his urinary catheter. No significant wt changes noted per EMR. Noted from previous admission (8/20) that pt does drink ONS at home. Will offer as able r/t pts report of decreased po intakes, pt is now NPO. Unable to see pt as receiving wound care at time of visit. RD to monitor po intakes and ONS acceptance this admission. MALNUTRITION ASSESSMENT  Context of Malnutrition: Acute Illness   Malnutrition Status: At risk for malnutrition (Comment)  Findings of the 6 clinical characteristics of malnutrition (Minimum of 2 out of 6 clinical characteristics is required to make the diagnosis of moderate or severe Protein Calorie Malnutrition based on AND/ASPEN Guidelines):  Energy Intake: Unable to Assess    Energy Intake Time: Unable to assess   Weight Loss %: No significant loss   Weight loss Time: Greater than or equal to 6 months   Fluid Accumulation: Moderate (2+)   Fluid Accumulation Location: Extremities    Strength: Not Performed;  Not Measured     NUTRITION DIAGNOSIS  Problem: Problem #1: Predicted inadequate energy intake  Etiology: Insufficient energy/nutrient consumption  Signs & Symptoms: report of decreased appetite     NUTRITION INTERVENTION  Food and/or Nutrient Delivery:Continue NPO  Nutrition education/counseling/coordination of care: Continue Inpatient Monitoring     NUTRITION MONITORING & EVALUATION:  Evaluation:Goals set   Goals:Goals: Pt will consume >50% of meals and ONS this admission  Monitoring: Nutrition Progression  or Weight      OBJECTIVE DATA:  · Nutrition-Focused Physical Findings: +2 BLE edema   · Wounds Buttocks Right;Left IAD/MASD  excoriation       Past Medical History: Diagnosis Date    BPH (benign prostatic hyperplasia)     Carotid stenosis 12/2013    JESU 62-82% stenosis; LICA 47-98% stenosis    Cellulitis 12/2013    LLE    Chronic back pain     Diverticular disease     Erectile dysfunction     GERD (gastroesophageal reflux disease)     History of atrial fibrillation     Hypercholesteremia     Hypertension     Lower GI bleed     MDRO (multiple drug resistant organisms) resistance 10/26/2019    urine    Neuromuscular disorder (HCC)     spasticity    Renal insufficiency     Risk for falls     uses walker    Tinea corporis 12/2013    LLE    Vitamin B12 deficiency         ANTHROPOMETRICS  Current Height: 5' 11\" (180.3 cm)  Current Weight: 218 lb 4.1 oz (99 kg)    Admission weight: 224 lb (101.6 kg)  Ideal Bodyweight 172 lb     Weight Changes: none noted        BMI BMI (Calculated): 30.5    Wt Readings from Last 50 Encounters:   02/15/21 218 lb 4.1 oz (99 kg)   12/17/20 198 lb (89.8 kg)   09/21/20 198 lb (89.8 kg)   08/27/20 186 lb (84.4 kg)   08/19/20 189 lb 9.5 oz (86 kg)   06/29/20 202 lb (91.6 kg)   06/11/20 213 lb 10 oz (96.9 kg)   05/04/20 218 lb 14.7 oz (99.3 kg)   03/20/20 207 lb (93.9 kg)   02/10/20 207 lb 3.2 oz (94 kg)   01/20/20 177 lb 4 oz (80.4 kg)     COMPARATIVE STANDARDS  Estimated Total Kcals/Day : 22-25  Current Bodyweight (99 kg) 4057-1145 kcal    Estimated Total Protein (g/day) : 1.3-1.5 Ideal Bodyweight  (78 kg) 101-117 g/day  Estimated Daily Total Fluid (ml/day): 1 mL/kcal per day     Food / Nutrition-Related History  Pre-Admission / Home Diet:  Pre-Admission/Home Diet: General   Home Supplements / Herbals:    none noted  Food Restrictions / Cultural Requests:    none noted    Current Nutrition Therapies   Diet NPO Effective Now     PO Intake: NPO  PO Supplement: NPO   PO Supplement Intake: NPO  IVF:none     NUTRITION RISK LEVEL: Risk Level:  Moderate     Jl Curtis LD  Kevin:  [phone]  Office:  [phone]

## 2021-02-15 NOTE — PROGRESS NOTES
4 Eyes Admission Assessment     I agree as the admission nurse that 2 RN's have performed a thorough Head to Toe Skin Assessment on the patient. ALL assessment sites listed below have been assessed on admission. Areas assessed by both nurses:   [x]   Head, Face, and Ears   [x]   Shoulders, Back, and Chest  [x]   Arms, Elbows, and Hands   [x]   Coccyx, Sacrum, and Ischium  [x]   Legs, Feet, and Heels        Does the Patient have Skin Breakdown?   No        Terrance Prevention initiated:  Yes  Wound Care Orders initiated:  Yes      [id number]  nurse consulted for Pressure Injury (Stage 3,4, Unstageable, DTI, NWPT, and Complex wounds) or Terrance score 18 or lower:  Yes     Nurse 1 eSignature: Viktoriya Fonseca RN    SHARE this note so that the co-signing nurse is able to place an eSignature    Nurse 2 eSignature: Electronically signed by Anila Waller RN on 2/15/21 at 7:53 AM EST

## 2021-02-15 NOTE — PROGRESS NOTES
Pt is alert and oriented. Vsstable. C/o pain but pt repositioned, no pain noted. Pt up to chair pt/ot suggest using raymundo lift. No new skin issues. Pt getting ointment and on speciality bed. Pt decreased o2 to 2. 5. will check to see if pt tolerates. Will continue to monitor.

## 2021-02-15 NOTE — PLAN OF CARE
Problem: Falls - Risk of:  Goal: Will remain free from falls  Description: Will remain free from falls  Outcome: Ongoing  Note: Pt bed in low position and alarm on. Non skid socks on. Belongings, bedside table, and call light within reach. 2/4 side rails up. Will continue to monitor. Problem: Skin Integrity:  Goal: Absence of new skin breakdown  Description: Absence of new skin breakdown  Outcome: Ongoing  Note: Will continue to check, change, and turn. Pt now on speciality bed.

## 2021-02-15 NOTE — PROGRESS NOTES
Admission completed. Message sent to hospitalist at this time: \"RN reviewed home medication list with pt - pt states he takes gabapentin at home, and Colace only daily PRN (ordered as BID scheduled). Would you like to add or change order for Colace? Thanks. \"    Awaiting response from MD, primary RN aware.

## 2021-02-15 NOTE — PROGRESS NOTES
Consult received to dose vancomycin. Received dose at 0156 this morning. Will wait until morning creatinine results to evaluate.      Thanks,  Lamin Baez [facility] 2/15/2021, 4:23 AM

## 2021-02-15 NOTE — PROGRESS NOTES
Speech Language Pathology  Facility/Department: [address]   CLINICAL BEDSIDE SWALLOW EVALUATION    NAME: Vi Glass  : 1940  MRN: [id number]    ADMISSION DATE: 2021  ADMITTING DIAGNOSIS: has Hypotension; Light headed; Cellulitis; Essential hypertension; GERD (gastroesophageal reflux disease); Acute blood loss anemia; Tinea corporis; Carotid stenosis; CAD (coronary artery disease); S/P CABG x 5; Lower GI bleeding; Hip fracture, right ([address] Acute right hip pain; Acute low back pain without sciatica; Hypothermia; Complicated UTI (urinary tract infection); Edema; Problem with urinary catheter ([address] Acute encephalopathy; Chronic indwelling Iglesias catheter; Hyperlipidemia; Bilateral lower leg cellulitis; Neurogenic bladder; Bacteriuria; Abnormality of urethral meatus; Gross hematuria; CHF with unknown LVEF ([address] Dyspnea; Bradycardia; Pseudomonas infection; Acute renal injury ([address] Cellulitis of scrotum; Constipation; Encopresis with constipation and overflow incontinence; Abnormal stress test; H/O angiography; Abnormal angiogram; Acute cystitis with hematuria; Acute renal failure superimposed on stage 3 chronic kidney disease ([address] and Urinary tract infection associated with indwelling urethral catheter ([address] on their problem list.  ONSET DATE: 21    Recent Chest Xray/CT of Chest: (21 )  Impression       1. Subcutaneous edema in the penis and scrotum which is nonspecific but may indicate cellulitis. No subcutaneous emphysema. 2. Cholelithiasis without evidence of acute cholecystitis. 3. Hypodense lesions in the right kidney are unchanged from the recent prior studies. 4. Left nephrolithiasis without hydronephrosis. 5. Moderate distention of the rectum. 6. Moderate hiatal hernia.        Date of Eval: 2/15/2021  Evaluating Therapist: Mary Guerrero    Current Diet level:  Current Diet : NPO  Current Liquid Diet : NPO      Primary Complaint  Patient Complaint: Feels throat is \"rattling\"; hoarse voice    Pain:  Pain Assessment  Pain Assessment: 0-10  Pain Level: 0  Patient's Stated Pain Goal: No pain    Reason for Referral  Lazarus Bijou was referred for a bedside swallow evaluation to assess the efficiency of his swallow function, identify signs and symptoms of aspiration and make recommendations regarding safe dietary consistencies, effective compensatory strategies, and safe eating environment. Impression  Dysphagia Diagnosis: Swallow function appears grossly intact  Dysphagia Impression : Oropharyngeal swallow appears grossly WFLs at this time; pt noted to have intermittent throat clear regardless of PO, and endorsed onset after aspiration event during breakfast. Suspect irritation and increased secretions for protection. No observed difficulties during evaluation; no overt s/s of aspiration w/ any trials. Completed 3oz water test w/o difficulties. Pt endorsed consuming softer foods at home, so recommend dental soft + thin liquids at this time w/ close monitoring of respiratory status and s/s of aspiration. Pt endorsed a \"rattling\" sensation in his throat which he characterized as vocal roughness; suspect may be d/t weakness and high levels of oxygen. Will continue to monitor. Dysphagia Outcome Severity Scale: Level 6: Within functional limits/Modified independence     Treatment Plan  Requires SLP Intervention: Yes  Duration/Frequency of Treatment: 1-3x  D/C Recommendations: 24 hour supervision/assistance  Referral To: OT;PT    Recommended Diet and Intervention  Diet Solids Recommendation: Dental Soft  Liquid Consistency Recommendation: Thin  Recommended Form of Meds: PO  Recommendations: Therapeutic feeds with SLP only  Therapeutic Interventions: Diet tolerance monitoring;Oral care; Patient/Family education    Compensatory Swallowing Strategies  Compensatory Swallowing Strategies: Upright as possible for all oral intake;Remain upright for 30-45 minutes after meals; Small bites/sips    Treatment/Goals  Dysphagia Goals: The patient will tolerate recommended diet without observed clinical signs of aspiration; The patient/caregiver will demonstrate understanding of compensatory strategies for improved swallowing safety. General  Chart Reviewed: Yes  Comments: 79y.o. year-old male with a history of hypertension, hyperlipidemia, CAD s/p CABG x 5 and a chronic indwelling Iglesias catheter. He has a h/o complicated UTIs with multi-drug resistant organisms. Subjective  Subjective: Partially reclined in bed upon arrival; resting comfortably on NC at 10L. Agreeable to evaluation; AAOx4. Consult for SLP placed after aspiration episode w/ orange juice during breakfast. Pt endorsed he does not typically cough during meals, but does avoid food that breaks into pieces, such as chips or crackers. Behavior/Cognition: Alert; Cooperative  Temperature Spikes Noted: No  Respiratory Status: O2 via nasual cannula  Breath Sounds: Diminished  O2 Device: Nasal cannula  Liters of Oxygen: 10 L  Communication Observation: Functional  Follows Directions: Complex  Dentition: Adequate  Patient Positioning: Upright in bed  Baseline Vocal Quality: Hoarse  Volitional Cough: Strong  Prior Dysphagia History: None per chart review or pt report  Consistencies Administered: Reg solid; Dysphagia Pureed (Dysphagia I); Thin - teaspoon; Thin - cup; Thin - straw; Ice Chips      Vision/Hearing  Vision  Vision: Within Functional Limits  Hearing  Hearing: Exceptions to [facility]  Hearing Exceptions: Hard of hearing/hearing concerns    Oral Motor Deficits  Oral/Motor  Oral Motor: Exceptions to WFL(LL facial droop and reduced strength)  Labial ROM: Reduced left  Labial Symmetry: Abnormal symmetry left  Labial Strength: Reduced    Oral Phase Dysfunction  Oral Phase  Oral Phase: WFL  Oral Phase  Oral Phase - Comment: Oral phase grossly WFLs; noted to have initial difficulties forming labial seal around straw for thin liquids

## 2021-02-15 NOTE — PROGRESS NOTES
[facility] Wound Ostomy Continence Nurse  Follow-up Progress Note       NAME:  Harinder Abarca [facility]  MEDICAL RECORD NUMBER:  [id number]  AGE:  79 y.o. GENDER:  male  :  1940  TODAY'S DATE:  2/15/2021    Subjective:   Wound Identification: buttocks  Wound Type: IAD/MASD  Contributing Factors: full incontinence, PVD, CAD, NEUROMUSCULAR DI., dm       Patient Goal of Care:  [x] Wound Healing  [] Odor Control  [x] Palliative Care  [] Pain Control   [] Other:     Objective:    BP 97/60   Pulse 78   Temp 96.8 °F (36 °C) (Axillary)   Resp 18   Ht 5' 11\" (1.803 m)   Wt 218 lb 4.1 oz (99 kg)   SpO2 93%   BMI 30.44 kg/m²   Terrance Risk Score: Terrance Scale Score: 16  Assessment:   Measurements:  Wound 20 Buttocks Mid;Left;Right -coccyx-gluteal cleft-posterior thighs    IAD/moisture associated dermatitis - diarrhea/?C-dif (Active)   Number of days: 250       Wound 20 Arm Lower 2 skin tears. Pink wound bed. (Active)   Number of days: 147       Wound 02/15/21 Buttocks Right;Left IAD/MASD  excoriation (Active)   Wound Etiology Other 02/15/21 1009   Dressing/Treatment Other (comment) 02/15/21 1009   Wound Assessment Denuded;Pink/red;Superficial 02/15/21 1009   Odor None 02/15/21 1009   Usha-wound Assessment Hemosiderin staining (brown yellow); Hyperpigmented 02/15/21 1009   Margins Unattached edges; Undefined edges 02/15/21 1009   Wound Thickness Description not for Pressure Injury Partial thickness 02/15/21 1009   Number of days: 0     Very moist groins and scrotum - recommend Interdry sling- added to orders. Response to treatment: no c/o's  Plan:   Plan of Care: Wound 02/15/21 Buttocks Right;Left IAD/MASD  excoriation-Dressing/Treatment: Other (comment)     STOP zinc, use VENELEX ONLY TO AFFECTED AREAS OT BUTTOCKS/SACRUM, MEDIGRIP SLEEVES OT BLE BEFORE RISING TOES TO KNEES- NEW SLEEVES AT BEDSIDE. hEEL BOOTS WHEN IN BED.     Specialty Bed Required :yes, ordered  [x] Low Air Loss   [] Pressure Redistribution  [] Fluid Immersion  [] Bariatric  [] Total Pressure Relief  [] Other:     Current Diet: DIET GENERAL;    Patient Teaching: excoriations/moisture issues, BLE edema and redness, compression sleeves, treatments  Level of patient/caregiver understanding able to:   [x] Indicates understanding       [x] Needs reinforcement  [] Unsuccessful      [x] Verbal Understanding  [] Demonstrated understanding       [] No evidence of learning  [] Refused teaching         [] N/A       Electronically signed by Candi Choudhary RN, CWOCN on 2/15/2021 at 10:11 AM

## 2021-02-15 NOTE — PROGRESS NOTES
Pt's BP on the soft side other wise VSS, denies pain, n/c, sob, cp. IVF infusing, tolerating diet, schneider intact with great yellow urine output. Cream was applied to affected area, pt was turned and repositioned, specialty bed was ordered, BLE elevated. Call light and bedside table within reach. Uneventful night with pt.

## 2021-02-15 NOTE — ED NOTES
PARADA NOT EMPTYING AT FIRST. PARADA FLUSHED WITH 30 ML STERILE WATER. RN ABLE TO IRRIGATE LARGE BLOOD CLOT. PARADA NOW DRAINING APPROPRIATELY.       Farhana Plummer RN  02/14/21 1949

## 2021-02-15 NOTE — CONSULTS
Clinical Pharmacy Progress Note     Admit date: 2/14/21     Subjective/Objective:  Pharmacy is consulted to dose Vancomycin x1 dose in ED per Dr. Morgan Slider     Wt = 101.6 kg        Assessment/Plan:  · Will order Vancomycin 1250 mg IV x1 for administration in ED per ER pharmacy consult.    · If Vancomycin is to continue on admission and pharmacy is to manage dosing, please re-consult with admission orders.        Thanks--  Joelle Tolenitno [facility] 2/15/2021, 12:34 AM

## 2021-02-15 NOTE — ED NOTES
Report to [facility], transport to floor      [place], [address] [facility]  02/15/21 2973

## 2021-02-15 NOTE — PLAN OF CARE
Nutrition Problem #1: Predicted inadequate energy intake  Intervention: Food and/or Nutrient Delivery: Continue Current Diet, Start Oral Nutrition Supplement  Nutritional Goals: Pt will consume >50% of meals and ONS this admission

## 2021-02-15 NOTE — H&P
Wilhemena Echevaria), svg to diag, om1 and 3, distal rca, kelly to lad.  CYSTOSCOPY N/A 1/10/2019    CYSTOSCOPY performed by Mikhail Navarrete MD at [address] Right 5/1/2015    ORI    SIGMOIDOSCOPY N/A 6/11/2020    SIGMOIDOSCOPY DIAGNOSTIC FLEXIBLE performed by Kristopher Sousa MD at [facility] 27 N/A 5/4/2020    EGD BIOPSY performed by Kristopher Sousa MD at [facility] ENDOSCOPY       Allergies:  Bactrim [sulfamethoxazole-trimethoprim]    Medications Prior to Admission:    Prior to Admission medications    Medication Sig Start Date End Date Taking? Authorizing Provider   furosemide (LASIX) 40 MG tablet TAKE 1 TABLET TWICE DAILY 1/11/21   TIM Voss CNP   docusate sodium (COLACE) 100 MG capsule Take 1 capsule by mouth 2 times daily 12/13/20   JACQUELIN Mckeon   vitamin B-12 (CYANOCOBALAMIN) 1000 MCG tablet Take 1,000 mcg by mouth daily    Historical Provider, MD   potassium chloride (KLOR-CON M) 20 MEQ extended release tablet Take 1 tablet by mouth daily 6/29/20   TIM Voss CNP   aspirin 81 MG chewable tablet Take 1 tablet by mouth daily 5/7/20   Luc Elam MD   pantoprazole (PROTONIX) 40 MG tablet Take 1 tablet by mouth every morning (before breakfast) 5/6/20   Luc Primes, MD   Balsam [place]-Castor Oil (VENELEX) OINT ointment Apply topically 2 times daily  Patient taking differently: Apply topically as needed  3/20/20   Huong Stafford MD   tamsulosin (FLOMAX) 0.4 MG capsule Take 0.8 mg by mouth daily     Historical Provider, MD   Melatonin 10 MG TABS Take 10 mg by mouth nightly as needed     Historical Provider, MD   atorvastatin (LIPITOR) 80 MG tablet Take 80 mg by mouth nightly. Historical Provider, MD   zolpidem (AMBIEN) 10 MG tablet Take 10 mg by mouth nightly as needed for Sleep.      Historical Provider, MD       Family History:       Problem Relation Age of Onset    Heart Disease Father      Social abdominal wall with a spinal stimulator present. No intra-abdominal fluid collections. Extensive vascular calcifications are present without aneurysm. No lymphadenopathy. Postsurgical changes in the right hip. Diffuse osteopenia. No suspicious osseous lesions. 1. Subcutaneous edema in the penis and scrotum which is nonspecific but may indicate cellulitis. No subcutaneous emphysema. 2. Cholelithiasis without evidence of acute cholecystitis. 3. Hypodense lesions in the right kidney are unchanged from the recent prior studies. 4. Left nephrolithiasis without hydronephrosis. 5. Moderate distention of the rectum. 6. Moderate hiatal hernia. Assessment:   Principal Problem:    Acute cystitis with hematuria  Active Problems:    Essential hypertension    CAD (coronary artery disease)    S/P CABG x 5    Hyperlipidemia    Acute renal failure superimposed on stage 3 chronic kidney disease (HCC)    Urinary tract infection associated with indwelling urethral catheter ([address]  Resolved Problems:    * No resolved hospital problems. *      Plan:         Acute cystitis with hematuria/Urinary tract infection associated with indwelling urethral catheter ([address] - patient was started on Cefepime and Vancomycin empirically. Urine culture and sensitivities have been ordered, and antibiotic therapy will be adjusted as necessary based upon those results. Excoriated skin in his groin - appears irritated, does not appear infected. Vancomycin was added to cover for a possible infection. We will check a procalcitonin level and CRP. A barrier cream was ordered to protect the skin    CAD (coronary artery disease) s/p CABG x 5 - stable, continue aspirin and statin    Acute renal failure superimposed on stage 3 chronic kidney disease (HCC) - hold Lasix initially and will give gentle IV fluids. Monitor renal function     Essential (primary) hypertension -no longer needs medications to control this.   His blood pressure is on the low side.  Monitor blood pressure    Hyperlipidemia - No current evidence of Rhabdomyolysis or other adverse effects. Continue statin therapy while in the hospital        DVT Prophylaxis: Heparin   Diet: DIET GENERAL;  Code Status: Full Code  (Advanced care planning has been discussed with patient and/or responsible family member and is reflected in the code status.  Further orders associated with this have been entered if appropriate)    Disposition: Anticipate that patient will remain in the hospital for 2 to 3 days depending on further evaluation and clinical course    Please note that over 50 minutes was spent in evaluating the patient, review of records and results, discussion with staff/family, etc.    Gina Caruso MD

## 2021-02-15 NOTE — PROGRESS NOTES
Pt has episode of aspiration and vomitting. Joe Martins notified urgently. Pt no new skin issues. Iglesias care performed. Pt now NPO with speech eval in place. Pt did have to be put on 10L02 on high flow for destating down to 80's. New speciality bed ordered. And jovan saw pt. Will continue to monitor.

## 2021-02-15 NOTE — ED PROVIDER NOTES
[id number]          ATTENDING PHYSICIAN NOTE       Date of evaluation: 2/14/2021    Chief Complaint     Other (pt ripped out catheter and is bleeding from penis; home health nurse sent; squad also states that pt is not taking care of himself at home; poor living conditions )      History of Present Illness     Adonis Cain is a 79 y.o. male who presents with apparent bleeding from his penis. Patient is a poor historian, but reports that when a nurse came today to change his Iglesias catheter they noted some bleeding from his penis and subsequently was sent to the ED because of it. Denies any pain with it. Denies any fevers chills or discomfort. There was question in the report that the patient may have pulled Iglesias out himself, but he says he did not. Of note, review of the outside records patient complained of hematuria several days ago, with a call to his PCP, and had a urinalysis performed which he says he does not know the results of, and was told with a couple of days, so reportedly is not on any antibiotics for right now. Review of Systems     Review of Systems  Pertinent positives and negatives are listed in the HPI; otherwise all systems are reviewed and were negative. Past Medical, Surgical, Family, and Social History     He has a past medical history of BPH (benign prostatic hyperplasia), Carotid stenosis, Cellulitis, Chronic back pain, Diverticular disease, Erectile dysfunction, GERD (gastroesophageal reflux disease), History of atrial fibrillation, Hypercholesteremia, Hypertension, Lower GI bleed, MDRO (multiple drug resistant organisms) resistance, Neuromuscular disorder ([facility] [address] Renal insufficiency, Risk for falls, Tinea corporis, and Vitamin B12 deficiency. He has a past surgical history that includes back surgery (2006); Foot surgery; Coronary artery bypass graft (12/13/2013); hip surgery (Right, 5/1/2015); Cystoscopy (N/A, 1/10/2019);  Upper gastrointestinal endoscopy (N/A, 5/4/2020); and sigmoidoscopy (N/A, 6/11/2020). His family history includes Heart Disease in his father. He reports that he quit smoking about 51 years ago. He has never used smokeless tobacco. He reports that he does not drink alcohol or use drugs. Medications     Current Discharge Medication List      CONTINUE these medications which have NOT CHANGED    Details   furosemide (LASIX) 40 MG tablet TAKE 1 TABLET TWICE DAILY  Qty: 120 tablet, Refills: 2      docusate sodium (COLACE) 100 MG capsule Take 1 capsule by mouth 2 times daily  Qty: 30 capsule, Refills: 0      vitamin B-12 (CYANOCOBALAMIN) 1000 MCG tablet Take 1,000 mcg by mouth daily      potassium chloride (KLOR-CON M) 20 MEQ extended release tablet Take 1 tablet by mouth daily  Qty: 30 tablet, Refills: 3      aspirin 81 MG chewable tablet Take 1 tablet by mouth daily  Qty: 30 tablet, Refills: 3      pantoprazole (PROTONIX) 40 MG tablet Take 1 tablet by mouth every morning (before breakfast)  Qty: 30 tablet, Refills: 3      Balsam [place]-Castor Oil (VENELEX) OINT ointment Apply topically 2 times daily  Qty: 2 Tube, Refills: 0      tamsulosin (FLOMAX) 0.4 MG capsule Take 0.8 mg by mouth daily       Melatonin 10 MG TABS Take 10 mg by mouth nightly as needed       atorvastatin (LIPITOR) 80 MG tablet Take 80 mg by mouth nightly. zolpidem (AMBIEN) 10 MG tablet Take 10 mg by mouth nightly as needed for Sleep. Allergies     He is allergic to bactrim [sulfamethoxazole-trimethoprim]. Physical Exam     INITIAL VITALS: BP: (!) 94/53, Temp: 98.7 °F (37.1 °C), Pulse: 72, Resp: 18, SpO2: 96 %   Physical Exam  Constitutional:       Appearance: He is not toxic-appearing. HENT:      Head: Normocephalic and atraumatic. Nose: Nose normal.      Mouth/Throat:      Mouth: Mucous membranes are moist.   Eyes:      Conjunctiva/sclera: Conjunctivae normal.   Neck:      Musculoskeletal: Normal range of motion. Cardiovascular:      Rate and Rhythm: Normal rate. Pulmonary:      Effort: Pulmonary effort is normal. No respiratory distress. Abdominal:      General: There is no distension. Tenderness: There is no abdominal tenderness. Genitourinary:     Comments: Penis nontender, without obvious lesion; there is small amount of clotted blood at the meatus and in the diaper. There is some moderate scrotal edema with erythema mostly in the dependent segments but is associated tenderness  Skin:     Comments: There is erythema and what appears to be symptoms early stages of skin breakdown on the sacrum extending onto the buttocks and on the proximal medial thighs posteriorly   Neurological:      Mental Status: Mental status is at baseline. Diagnostic Results     EKG       RADIOLOGY:  CT ABDOMEN PELVIS WO CONTRAST Additional Contrast? None   Final Result      1. Subcutaneous edema in the penis and scrotum which is nonspecific but may indicate cellulitis. No subcutaneous emphysema. 2. Cholelithiasis without evidence of acute cholecystitis. 3. Hypodense lesions in the right kidney are unchanged from the recent prior studies. 4. Left nephrolithiasis without hydronephrosis. 5. Moderate distention of the rectum. 6. Moderate hiatal hernia.           LABS:   Results for orders placed or performed during the hospital encounter of 02/14/21   Urinalysis, reflex to microscopic   Result Value Ref Range    Color, UA Yellow Straw/Yellow    Clarity, UA Clear Clear    Glucose, Ur Negative Negative mg/dL    Bilirubin Urine Negative Negative    Ketones, Urine Negative Negative mg/dL    Specific Gravity, UA 1.020 1.005 - 1.030    Blood, Urine LARGE (A) Negative    pH, UA 5.5 5.0 - 8.0    Protein, UA Negative Negative mg/dL    Urobilinogen, Urine 0.2 <2.0 E.U./dL    Nitrite, Urine POSITIVE (A) Negative    Leukocyte Esterase, Urine LARGE (A) Negative    Microscopic Examination YES     Urine Type NotGiven    CBC Auto lactated ringers infusion 1,000 mL    cefepime (MAXIPIME) 1000 mg IVPB minibag     Order Specific Question:   Antimicrobial Indications     Answer:   Urinary Tract Infection    vancomycin (VANCOCIN) 1,250 mg in dextrose 5 % 250 mL IVPB     Order Specific Question:   Antimicrobial Indications     Answer:   Urinary Tract Infection       CONSULTS:  PHARMACY TO DOSE VANCOMYCIN  IP CONSULT TO HOSPITALIST  PHARMACY TO DOSE VANCOMYCIN    MEDICAL DECISIONMAKING / ASSESSMENT / Thelma Steve is a 79 y.o. male presents with blood per urethra today. He does have some blood at the meatus, and in his diaper, but clear urine. A Iglesias was placed, and after evacuation of one clot appears to be returning essentially clear urine. UA shows some red cells, but nitrite positive and concern for urinary tract infection. He has a bit of an ALIS with baseline creatinine somewhere around 1.2, elevated 2.0 today. Unclear if this is obstructive or prerenal, as he does appear somewhat dry, and apparently his living conditions are somewhat substandard right now, with questionable care per his report, though it is unclear if this is actually the case. We did obtain a CT scan of the abdomen pelvis given the scrotal erythema, and demonstrates some edema without gas formation or obvious abscess; I think the differential on this would include edema versus cellulitis given the erythema, and while it does not appear indurated on exam, in the setting of these changes I think it best to go and cover the patient for skin anne with some vancomycin as well. On review of the patient's chart, he does have multidrug-resistant organisms in his urine cultures previously, most notably some Pseudomonas, and some previous resistant organisms, but enterally. Have cefepime sensitivity. This may explain lack of response to outpatient treatment with Cipro.   Given the patient's history of multidrug-resistant organisms, apparent failure of outpatient treatment, and his ALIS, I think the best patient be admitted. He was noted to have soft blood pressures, systolics around 455, not changing significantly with bolus IV fluid x2 L. Is not tachycardic, his lactate is within normal limits, we will not pursue further sepsis fluid bolusing. Blood culture was obtained, though afebrile and with only mild leukocytosis and no evidence of systemic infection otherwise. Spoke to hospitalist who will admit. Clinical Impression     1. Urinary tract infection with hematuria, site unspecified    2. ALIS (acute kidney injury) ([facility] [address]    3. Cellulitis of scrotum        Disposition     PATIENT REFERRED TO:  No follow-up provider specified.     DISCHARGE MEDICATIONS:  Current Discharge Medication List          DISPOSITION Admitted 02/14/2021 11:39:09 PM         Kat Villalpando MD  02/15/21 0040

## 2021-02-15 NOTE — PROGRESS NOTES
Pt did have emesis of breakfast food this am. Along with prolonged coughing. Concern for aspiration. Chest xray completed. Swallow eval done as well.

## 2021-02-15 NOTE — CONSULTS
Clinical Pharmacy Progress Note    Admit date: 2/14/2021     Subjective/Objective:  Pt is a 72yom with PMHx that includes HTN, CAD s/p CABG x5, GERD, carotid stenosis, BPH, chronic back pain, spasticity, HLD, A.fib, CKD (baseline appears to be ~1.2-1.3), and recurrent UTI's with MDRO's who is admitted from his NH with penile bleeding after pulling his chronic schneider out - found to have UTI and ALIS. Interval update:  Urine & Blood cultures pending. Pharmacy is consulted to dose Vancomycin per Dr. Clementine Chance    Current antibiotics:  Cefepime 1g IV q12h (2/15-current) - day #1  Vancomycin - Pharmacy to dose - day #1   Intermittent dosing (2/15-current)    Date Vancomycin level Vancomycin dose   2/15   13.3mcg/mL @ 09:07 1.25g @ 02:00                   Recent Labs     02/14/21  1945 02/15/21  0516    138   K 3.9 4.0   CL 99 104   CO2 26 24   BUN 56* 54*   CREATININE 2.0* 2.0*   GLUCOSE 130* 134*       Estimated Creatinine Clearance: 36 mL/min (A) (based on SCr of 2 mg/dL (H)). Lab Results   Component Value Date    WBC 8.5 02/15/2021    HGB 10.3 (L) 02/15/2021    HCT 31.5 (L) 02/15/2021    MCV 95.3 02/15/2021     (L) 02/15/2021       Lab Results   Component Value Date    PROTIME 14.1 (H) 02/14/2021    INR 1.21 (H) 02/14/2021       Height:  5' 11\" (180.3 cm)  Weight:  218 lb 4.1 oz (99 kg)    Culture results:  Blood (2/15) = sent  Urine (2/15) = sent    Prophylaxis:  VTE:  Heparin subcut  GI:  Pantoprazole      Assessment/Plan:  1)  UTI:  Cefepime + Vancomycin - day #1  · Cefepime -   Current dose remains appropriate based on indication and current renal function. Will continue to monitor and adjust as needed per [facility] Renal Dose Adjustment Policy. · Vancomycin - Pharmacy to dose  · Will dose intermittently based on levels given ALIS on CKD. · Received 1.25g IV x1 at 02:00. Random level at 09:00 = 13.3mcg/mL. Will give 750mg IV x1 to complete loading today.   Will check random level in AM 2/16 to guide further dosing. · Clinical condition will be monitored closely, and levels / doses will be ordered as appropriate.     Please call with questions--  Thanks--  Reece Grady, PharmD, BCPS, BCGP  [id number] ([facility])   2/15/2021 10:12 AM

## 2021-02-15 NOTE — PROGRESS NOTES
Pt was advised that M.d. said he did not feel comfortable about pt having diet. Pt was still adamant about having diet. M.d. stated that pt will be advised to have diet at his own risk. I told pt this, pt is persist on getting food. Pt dental soft diet reordered.

## 2021-02-15 NOTE — PLAN OF CARE
Bedside swallow evaluation completed. Please refer to EMR.     Thank you,    Matthieu Pierson) [place], [place], [address] [facility]; [phone]  Speech-Language Pathologist

## 2021-02-15 NOTE — CARE COORDINATION
Case Management Assessment           Initial Evaluation                Date / Time of Evaluation: 2/15/2021 2:17 PM                 Assessment Completed by: Waleska Roland Day    Patient Name: Sunny Cedeno     YOB: 1940  Diagnosis: Acute cystitis with hematuria [N30.01]     Date / Time: 2/14/2021  6:46 PM    Patient Admission Status: Inpatient    If patient is discharged prior to next notation, then this note serves as note for discharge by case management. [facility] Patient: No    Chart Reviewed: Yes  Patient/ Family Interviewed: Yes    Initial assessment completed at bedside with: Patient     Hospitalization in the last 30 days: No    Emergency Contacts:  Extended Emergency Contact Information  Primary Emergency Contact: [place] [address] Phone: [phone]  Work Phone: [phone]  Mobile Phone: [phone]  Relation: Child  Secondary Emergency Contact: Dana Callahan  Address: [address]  Home Phone: [phone]  Relation: Other    Advance Directives:   Code Status: Full [address]: No    Financial:  Payor: Felipe Hearing / Plan: [place] [employer] HMO / Product Type: *No Product type* /     Pre-cert required for SNF: Yes    Pharmacy:    [address] [place] [address] [phone] - F [phone]  [address] [place] [address]  Phone: [phone] Fax: [phone]    [id number] Deann Jeans, [address]  Phone: [phone] Fax: [phone]      Potential assistance Purchasing Medications: Potential Assistance Purchasing Medications: No  Does Patient want to participate in local refill/ meds to beds program?: Not Assessed    Meds To Beds General Rules:  1.  Can ONLY be done Monday- Friday between 8:30am-5pm  2. Prescription(s) must be in pharmacy by 3pm to be filled same day  3. Copy of patient's insurance/ prescription drug card and patient face sheet must be sent along with the prescription(s)  4. Cost of Rx cannot be added to hospital bill. If financial assistance is needed, please contact unit  or ;  or  CANNOT provide pharmacy voucher for patients co-pays  5. Patients can then  the prescription on their way out of the hospital at discharge, or pharmacy can deliver to the bedside if staff is available. (payment due at time of pick-up or delivery - cash, check, or card accepted)     Able to afford home medications/ co-pay costs: Yes    ADLS:  Support Systems: Children, Family Members    PT AM-PAC:   /24  OT AM-PAC:   /24    Housing:  Home Environment: Home with daughter who lives in building. Steps: no     Plans to RETURN to current housing: unsure; open to possible SNF pending progress   Barrier(s) to RETURNING to current housing: Medical needs/clearance. Home Care Information:  Currently ACTIVE with Home Health Care: No    Durable Medical Equipment:  DME Provider: N/A  Equipment: Lonita Khmer, crutches, wheelchair       Home Oxygen and Respiratory Equipment:  Has HOME OXYGEN prior to admission: No  Oxygen Company: Not Applicable    Dialysis:  Active with HD/PD prior to admission: No    DISCHARGE PLAN:  Disposition: Home with COA services and possible Skilled Care vs. SNF placement. Transportation PLAN for discharge: EMS transportation     Factors facilitating achievement of predicted outcomes: Cooperative and Pleasant    Barriers to discharge: Limited safety awareness and Limited insight into deficits    Additional Case Management Notes:   SW met with patient at [facility]. Patient is active with [facility], Tomás Srivastava is his Case Manger ([phone]) and will need notice to restart care.  Patient reported the aides have stopped coming in the house due to his daughter's cats he admits the have a smell due to lack of care by daughter to keep the litter box clean and clean up after them. As a result the aides no longer come up. Patient reports his daughter is supposed to move out this month, but he does not know when. He stated the aides will come back when she is gone. SW dicussed how this is accepting his care. Patient reported understanding. Patient was open to possible SNF placement at [facility] pending his progress. (Referral placed in Epic). Team reported concerns of possible aspiration. Will have chest X-ray and swallow eval.     The Plan for Transition of Care is related to the following treatment goals Acute cystitis with hematuria [N30.01]      The Patient and/or patient representative patient was provided with a choice of provider and agrees with the discharge plan Yes    Freedom of choice list was provided with basic dialogue that supports the patient's individualized plan of care/goals and shares the quality data associated with the providers.  Yes    Care Transition patient: No    ARIC Mittal  [facility] [employer]  Case Management Department  Ph: [phone]

## 2021-02-15 NOTE — PROGRESS NOTES
Hospitalist Progress Note      PCP: INES BLANDON [address]    Date of Admission: 2/14/2021    Chief Complaint:     Chief Complaint   Patient presents with    Other     pt ripped out catheter and is bleeding from penis; home health nurse sent; alexad also states that pt is not taking care of himself at home; poor living conditions        Subjective:  Patient seen and examined at the bedside.   Patient is A&Ox4  Continuing vanc/cefepime for UTI  He may need placement, he is open to this if needed  PT/OT    PFHS: reviewed as documented 2/14/2021, no changes    Medications:  Reviewed    Infusion Medications   Scheduled Medications    cefepime  1,000 mg Intravenous Q12H    tamsulosin  0.8 mg Oral Daily    pantoprazole  40 mg Oral QAM AC    docusate sodium  100 mg Oral BID    atorvastatin  80 mg Oral Nightly    aspirin  81 mg Oral Daily    sodium chloride flush  10 mL Intravenous 2 times per day    heparin (porcine)  5,000 Units Subcutaneous 3 times per day    vancomycin (VANCOCIN) intermittent dosing (placeholder)   Other See Admin Instructions     PRN Meds: zolpidem, melatonin, sodium chloride flush, ondansetron, polyethylene glycol, acetaminophen **OR** acetaminophen, zinc oxide      Intake/Output Summary (Last 24 hours) at 2/15/2021 0908  Last data filed at 2/15/2021 0043  Gross per 24 hour   Intake    Output 800 ml   Net -800 ml       Physical Exam    BP 97/60   Pulse 78   Temp 96.8 °F (36 °C) (Axillary)   Resp 18   Ht 5' 11\" (1.803 m)   Wt 218 lb 4.1 oz (99 kg)   SpO2 93%   BMI 30.44 kg/m²     General appearance:  No acute distress, appears stated age  Eyes: Pupils equal, round, reactive to light, conjunctiva/corneas clear  Ears/Nose/Mouth/Throat: No external lesions or scars, hearing intact to voice  Neck: Trachea midline, no masses noted, no thyromegaly  Respiratory:  Non-labored breathing, clear to auscultation bilaterally  Cardiovascular: Regular rate and rhythm, no murmurs, gallops, or rubs  Abdomen: soft, non-tender, non-distended  Musculoskeletal: Warm, well perfused, no cyanosis or edema  Skin: normal color, no wounds noted  Psychiatric: A&Ox4, good insight, moderate judgment    Labs:   Recent Labs     02/14/21  1945 02/15/21  0516   WBC 11.2* 8.5   HGB 11.6* 10.3*   HCT 35.8* 31.5*    121*     Recent Labs     02/14/21  1945 02/15/21  0516    138   K 3.9 4.0   CL 99 104   CO2 26 24   BUN 56* 54*   CREATININE 2.0* 2.0*   CALCIUM 9.1 8.6     No results for input(s): AST, ALT, BILIDIR, BILITOT, ALKPHOS in the last 72 hours. Recent Labs     02/14/21 1944   INR 1.21*     No results for input(s): Lyndee Yobani in the last 72 hours. Urinalysis:      Lab Results   Component Value Date    NITRU POSITIVE 02/14/2021    WBCUA 21-50 02/14/2021    BACTERIA 4+ 02/14/2021    RBCUA  02/14/2021    BLOODU LARGE 02/14/2021    SPECGRAV 1.020 02/14/2021    GLUCOSEU Negative 02/14/2021       Radiology:  CT ABDOMEN PELVIS WO CONTRAST Additional Contrast? None   Final Result      1. Subcutaneous edema in the penis and scrotum which is nonspecific but may indicate cellulitis. No subcutaneous emphysema. 2. Cholelithiasis without evidence of acute cholecystitis. 3. Hypodense lesions in the right kidney are unchanged from the recent prior studies. 4. Left nephrolithiasis without hydronephrosis. 5. Moderate distention of the rectum. 6. Moderate hiatal hernia.           Assessment/Plan:    Active Hospital Problems    Diagnosis Date Noted    Acute renal failure superimposed on stage 3 chronic kidney disease ([facility] [address] [N17.9, N18.30] 02/15/2021    Urinary tract infection associated with indwelling urethral catheter ([facility] [address] [M78.916G, N39.0] 02/15/2021    Acute cystitis with hematuria [N30.01] 02/14/2021    Hyperlipidemia [E78.5] 11/20/2019    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] 01/27/2014    CAD (coronary artery disease) [I25.10] 01/27/2014    Essential hypertension [I10] 12/16/2013       Plan:    # Acute cystitis  # Hematuria  -due to pulling out schneider  -continue cefepime/vancomycin  -await culture and sensitivities    # CAD  -s/p CABG x5  -continue asa, statin    # ALIS on CKD 3  -hold lasix initially w/ gentle fluids  -Cr stable    # HTN  -not on home meds    # HLD  -continue statin    DVT Prophylaxis: Heparin  Diet: DIET GENERAL;  Code Status: Full Code    PT/OT Eval Status: Ongoing    Dispo: Linda Bruner pending clinical improvement    Priti Cox MD

## 2021-02-16 LAB
ANION GAP SERPL CALCULATED.3IONS-SCNC: 8 MMOL/L (ref 3–16)
BASOPHILS ABSOLUTE: 0 K/UL (ref 0–0.2)
BASOPHILS RELATIVE PERCENT: 0 %
BUN BLDV-MCNC: 50 MG/DL (ref 7–20)
CALCIUM SERPL-MCNC: 8.2 MG/DL (ref 8.3–10.6)
CHLORIDE BLD-SCNC: 105 MMOL/L (ref 99–110)
CO2: 25 MMOL/L (ref 21–32)
CREAT SERPL-MCNC: 2.1 MG/DL (ref 0.8–1.3)
EOSINOPHILS ABSOLUTE: 0.4 K/UL (ref 0–0.6)
EOSINOPHILS RELATIVE PERCENT: 5 %
GFR AFRICAN AMERICAN: 37
GFR NON-AFRICAN AMERICAN: 31
GLUCOSE BLD-MCNC: 101 MG/DL (ref 70–99)
HCT VFR BLD CALC: 26.3 % (ref 40.5–52.5)
HEMOGLOBIN: 8.9 G/DL (ref 13.5–17.5)
LYMPHOCYTES ABSOLUTE: 0.3 K/UL (ref 1–5.1)
LYMPHOCYTES RELATIVE PERCENT: 4 %
MCH RBC QN AUTO: 31.7 PG (ref 26–34)
MCHC RBC AUTO-ENTMCNC: 33.9 G/DL (ref 31–36)
MCV RBC AUTO: 93.6 FL (ref 80–100)
MONOCYTES ABSOLUTE: 0.6 K/UL (ref 0–1.3)
MONOCYTES RELATIVE PERCENT: 8 %
NEUTROPHILS ABSOLUTE: 6.6 K/UL (ref 1.7–7.7)
NEUTROPHILS RELATIVE PERCENT: 83 %
PDW BLD-RTO: 15.9 % (ref 12.4–15.4)
PLATELET # BLD: 112 K/UL (ref 135–450)
PMV BLD AUTO: 8.9 FL (ref 5–10.5)
POTASSIUM REFLEX MAGNESIUM: 4.3 MMOL/L (ref 3.5–5.1)
RBC # BLD: 2.81 M/UL (ref 4.2–5.9)
SODIUM BLD-SCNC: 138 MMOL/L (ref 136–145)
VANCOMYCIN RANDOM: 9.5 UG/ML
WBC # BLD: 7.9 K/UL (ref 4–11)

## 2021-02-16 PROCEDURE — 97530 THERAPEUTIC ACTIVITIES: CPT

## 2021-02-16 PROCEDURE — 6370000000 HC RX 637 (ALT 250 FOR IP): Performed by: INTERNAL MEDICINE

## 2021-02-16 PROCEDURE — 80048 BASIC METABOLIC PNL TOTAL CA: CPT

## 2021-02-16 PROCEDURE — 85025 COMPLETE CBC W/AUTO DIFF WBC: CPT

## 2021-02-16 PROCEDURE — 2700000000 HC OXYGEN THERAPY PER DAY

## 2021-02-16 PROCEDURE — 92526 ORAL FUNCTION THERAPY: CPT | Performed by: SPEECH-LANGUAGE PATHOLOGIST

## 2021-02-16 PROCEDURE — 97535 SELF CARE MNGMENT TRAINING: CPT

## 2021-02-16 PROCEDURE — 1200000000 HC SEMI PRIVATE

## 2021-02-16 PROCEDURE — 2580000003 HC RX 258: Performed by: INTERNAL MEDICINE

## 2021-02-16 PROCEDURE — 94761 N-INVAS EAR/PLS OXIMETRY MLT: CPT

## 2021-02-16 PROCEDURE — 80202 ASSAY OF VANCOMYCIN: CPT

## 2021-02-16 PROCEDURE — 36415 COLL VENOUS BLD VENIPUNCTURE: CPT

## 2021-02-16 PROCEDURE — 6360000002 HC RX W HCPCS: Performed by: INTERNAL MEDICINE

## 2021-02-16 PROCEDURE — 97163 PT EVAL HIGH COMPLEX 45 MIN: CPT

## 2021-02-16 PROCEDURE — 97166 OT EVAL MOD COMPLEX 45 MIN: CPT

## 2021-02-16 RX ADMIN — HEPARIN SODIUM 5000 UNITS: 5000 INJECTION INTRAVENOUS; SUBCUTANEOUS at 21:47

## 2021-02-16 RX ADMIN — TAMSULOSIN HYDROCHLORIDE 0.8 MG: 0.4 CAPSULE ORAL at 09:57

## 2021-02-16 RX ADMIN — Medication 10 ML: at 10:04

## 2021-02-16 RX ADMIN — ZOLPIDEM TARTRATE 10 MG: 5 TABLET ORAL at 23:07

## 2021-02-16 RX ADMIN — GABAPENTIN 400 MG: 400 CAPSULE ORAL at 09:57

## 2021-02-16 RX ADMIN — Medication 10 ML: at 20:30

## 2021-02-16 RX ADMIN — GABAPENTIN 400 MG: 400 CAPSULE ORAL at 00:08

## 2021-02-16 RX ADMIN — DOCUSATE SODIUM 100 MG: 100 CAPSULE, LIQUID FILLED ORAL at 09:56

## 2021-02-16 RX ADMIN — HEPARIN SODIUM 5000 UNITS: 5000 INJECTION INTRAVENOUS; SUBCUTANEOUS at 14:59

## 2021-02-16 RX ADMIN — ATORVASTATIN CALCIUM 80 MG: 80 TABLET, FILM COATED ORAL at 20:30

## 2021-02-16 RX ADMIN — CEFEPIME HYDROCHLORIDE 1000 MG: 1 INJECTION, POWDER, FOR SOLUTION INTRAMUSCULAR; INTRAVENOUS at 09:57

## 2021-02-16 RX ADMIN — PANTOPRAZOLE SODIUM 40 MG: 40 TABLET, DELAYED RELEASE ORAL at 06:35

## 2021-02-16 RX ADMIN — ASPIRIN 81 MG: 81 TABLET, CHEWABLE ORAL at 09:56

## 2021-02-16 RX ADMIN — VANCOMYCIN HYDROCHLORIDE 1500 MG: 10 INJECTION, POWDER, LYOPHILIZED, FOR SOLUTION INTRAVENOUS at 10:03

## 2021-02-16 RX ADMIN — CEFEPIME HYDROCHLORIDE 1000 MG: 1 INJECTION, POWDER, FOR SOLUTION INTRAMUSCULAR; INTRAVENOUS at 00:00

## 2021-02-16 RX ADMIN — Medication 10 MG: at 01:10

## 2021-02-16 RX ADMIN — DOCUSATE SODIUM 100 MG: 100 CAPSULE, LIQUID FILLED ORAL at 20:30

## 2021-02-16 RX ADMIN — CASTOR OIL AND BALSAM, PERU: 788; 87 OINTMENT TOPICAL at 20:30

## 2021-02-16 RX ADMIN — ZOLPIDEM TARTRATE 10 MG: 5 TABLET ORAL at 00:08

## 2021-02-16 RX ADMIN — CASTOR OIL AND BALSAM, PERU: 788; 87 OINTMENT TOPICAL at 10:04

## 2021-02-16 RX ADMIN — GABAPENTIN 400 MG: 400 CAPSULE ORAL at 20:30

## 2021-02-16 RX ADMIN — HEPARIN SODIUM 5000 UNITS: 5000 INJECTION INTRAVENOUS; SUBCUTANEOUS at 06:30

## 2021-02-16 ASSESSMENT — PAIN SCALES - GENERAL
PAINLEVEL_OUTOF10: 0

## 2021-02-16 NOTE — PROGRESS NOTES
Speech Language Pathology  Facility/Department: [facility] [place] [address] [address]  Dysphagia Daily Treatment Note    NAME: Lazarus Bijou  : 1940  MRN: [id number]    Patient Diagnosis(es):   Patient Active Problem List    Diagnosis Date Noted    Acute renal failure superimposed on stage 3 chronic kidney disease ([facility] 02/15/2021    Urinary tract infection associated with indwelling urethral catheter ([facility] 02/15/2021    Acute cystitis with hematuria 2021    Abnormal angiogram 2020    H/O angiography 2020    Abnormal stress test 2020    Constipation 2020    Encopresis with constipation and overflow incontinence 2020    Cellulitis of scrotum 2020    Acute renal injury ([facility] 2020    Pseudomonas infection 2020    Dyspnea     Bradycardia     CHF with unknown LVEF ([facility] [facility] 2020    Gross hematuria 2019    Chronic indwelling Iglesias catheter 2019    Hyperlipidemia 2019    Bilateral lower leg cellulitis 2019    Neurogenic bladder 2019    Bacteriuria 2019    Abnormality of urethral meatus 2019    Acute encephalopathy 10/08/2019    Problem with urinary catheter ([facility] [facility] 10/07/2019    Edema     Complicated UTI (urinary tract infection) 2019    Hypothermia 2019    Acute low back pain without sciatica 2017    Hip fracture, right ([facility] 2015    Acute right hip pain     Lower GI bleeding 11/10/2014    CAD (coronary artery disease) 2014    S/P CABG x 5 2014    Cellulitis 2013    Essential hypertension 2013    GERD (gastroesophageal reflux disease) 2013    Acute blood loss anemia 2013    Tinea corporis 2013    Carotid stenosis 2013    Hypotension 2013    Light headed 2013     Allergies: Allergies   Allergen Reactions    Bactrim [Sulfamethoxazole-Trimethoprim] Rash       CXR (2/15/21)-  Impression       1. Slight progression of airspace disease in the right mid and upper lung zone. 2. Improved left mid airspace disease with decreased size of a small left pleural effusion. Previous MBS - NA    Chart reviewed. Medical Diagnosis: UTI  Treatment Diagnosis: Dysphagia    BSE Impression (2/15/21)-  Oropharyngeal swallow appears grossly WFLs at this time; pt noted to have intermittent throat clear regardless of PO, and endorsed onset after aspiration event during breakfast. Suspect irritation and increased secretions for protection. No observed difficulties during evaluation; no overt s/s of aspiration w/ any trials. Completed 3oz water test w/o difficulties. Pt endorsed consuming softer foods at home, so recommend dental soft + thin liquids at this time w/ close monitoring of respiratory status and s/s of aspiration. Pt endorsed a \"rattling\" sensation in his throat which he characterized as vocal roughness; suspect may be d/t weakness and high levels of oxygen. Will continue to monitor. MBS results - NA  Ongoing monitoring for instrumental warranted    Pain: Denied    Current Diet : Dental soft + thin liquids; small, single sips    Treatment:  Pt seen bedside to address the following goals:  1- The patient will tolerate recommended diet without observed clinical signs of aspiration  2/16: Pt observed w/ breakfast tray this date consisting of soft solid + thin liquids. Pt positioned upright in bed and agreeable to meal; stating at 96 O2 consistency w/ 5L NC. Pt requires assist feeds d/t UE weakness. Consumed all consistencies w/o overt s/s of aspiration; no impulsivity w/ thin liquids and noted to take small, single sips. No throat clear or change in vocal quality. Noted to have anterior spillage x1 w/ soft solid; independently cleared and endorsed \"distraction\" d/t conversation.   Cont goal    2- The patient/caregiver will demonstrate understanding of compensatory strategies for improved swallowing safety. 2/16: Pt w/ independent recall of diet recommendation and use of small sips; endorsed seeing it on the whiteboard and using it to assist during meals. Noted to have small, single sips w/o additional cues required. Pt expressed understanding of overt s/s of aspiration, and endorsed no difficulties since breakfast yesterday. Cont goal    Patient/Family/Caregiver Education:  See the above. Compensatory Strategies:  Upright during all meals  Oral care  Small sips       Plan:  Continued daily Dysphagia treatment with goals per plan of care. Diet recommendations: Dental soft + thin liquids  DC recommendation: TBD  Treatment: 21  D/W nursing, Heath Hudson  Needs met prior to leaving room, call button in reach. Thank you,    Gina Armstrong) [address], [address] [place] [address]; [phone]  Speech-Language Pathologist  Pg.  # [phone]    If patient is discharged prior to next treatment, this note will serve as the discharge summary

## 2021-02-16 NOTE — PLAN OF CARE
Problem: Falls - Risk of:  Goal: Will remain free from falls  Description: Will remain free from falls  2/16/2021 1250 by Alcon Oleary RN  Outcome: Ongoing  Note: Pt bed in low position and alarm on. Non skid socks on. Belongings, bedside table, and call light within reach. 2/4 side rails up. Will continue to monitor. 2/16/2021 0438 by Isael Shearer RN  Outcome: Ongoing  Note: Patient remains free from physical injury. Fall precautions in place: Patient in bed lowest position,wheels locked. 2/4 side rails up Call light and beside table within reach. Will continue to monitor       Problem: Skin Integrity:  Goal: Absence of new skin breakdown  Description: Absence of new skin breakdown  2/16/2021 1250 by Alcon Oleary RN  Outcome: Ongoing  Note: Will continue to monitor skin. 2/16/2021 0438 by Isael Shearer RN  Outcome: Ongoing  Note: Skin assessment done this shift. No new sign of skin breakdown noted. Patient Q2h turn/reposition. Venelex applied to affected area. Patient on speciality mattress.  Will continue to monitor

## 2021-02-16 NOTE — PROGRESS NOTES
Hospitalist Progress Note      PCP: INES BLANDON [address]    Date of Admission: 2/14/2021    Chief Complaint:     Chief Complaint   Patient presents with    Other     pt ripped out catheter and is bleeding from penis; home health nurse sent; squad also states that pt is not taking care of himself at home; poor living conditions        Subjective:  Patient seen and examined at the bedside. No complaints at this time. Had a coughing spell w/ foot, possibly aspirated. Required up to 10L right afterward, already titrated down to 4L. Chest XR was negative, will monitor for now, consider coverage for aspiration if not back to room air quickly.   Urine growing enterococcus, on vancomycin, await sensitivities    PFHS: reviewed as documented 2/14/2021, no changes    Medications:  Reviewed    Infusion Medications   Scheduled Medications    cefepime  1,000 mg Intravenous Q12H    tamsulosin  0.8 mg Oral Daily    pantoprazole  40 mg Oral QAM AC    docusate sodium  100 mg Oral BID    atorvastatin  80 mg Oral Nightly    aspirin  81 mg Oral Daily    sodium chloride flush  10 mL Intravenous 2 times per day    heparin (porcine)  5,000 Units Subcutaneous 3 times per day    vancomycin (VANCOCIN) intermittent dosing (placeholder)   Other See Admin Instructions    Venelex   Topical BID    gabapentin  400 mg Oral BID     PRN Meds: zolpidem, melatonin, sodium chloride flush, ondansetron, polyethylene glycol, acetaminophen **OR** acetaminophen      Intake/Output Summary (Last 24 hours) at 2/16/2021 1201  Last data filed at 2/16/2021 1130  Gross per 24 hour   Intake 2770 ml   Output 1600 ml   Net 1170 ml       Physical Exam    /60   Pulse 75   Temp 97.8 °F (36.6 °C) (Axillary)   Resp 18   Ht 5' 11\" (1.803 m)   Wt 218 lb 4.1 oz (99 kg)   SpO2 96%   BMI 30.44 kg/m²     General appearance:  No acute distress, appears stated age  Eyes: Pupils equal, round, reactive to light, conjunctiva/corneas clear  Ears/Nose/Mouth/Throat: No external lesions or scars, hearing intact to voice  Neck: Trachea midline, no masses noted, no thyromegaly  Respiratory:  Non-labored breathing, clear to auscultation bilaterally  Cardiovascular: Regular rate and rhythm, no murmurs, gallops, or rubs  Abdomen: soft, non-tender, non-distended  Musculoskeletal: Warm, well perfused, no cyanosis or edema  Skin: normal color, no wounds noted  Psychiatric: A&Ox4, good insight and judgment    Labs:   Recent Labs     02/14/21  1945 02/15/21  0516 02/16/21  0510   WBC 11.2* 8.5 7.9   HGB 11.6* 10.3* 8.9*   HCT 35.8* 31.5* 26.3*    121* 112*     Recent Labs     02/14/21  1945 02/15/21  0516 02/16/21  0510    138 138   K 3.9 4.0 4.3   CL 99 104 105   CO2 26 24 25   BUN 56* 54* 50*   CREATININE 2.0* 2.0* 2.1*   CALCIUM 9.1 8.6 8.2*     No results for input(s): AST, ALT, BILIDIR, BILITOT, ALKPHOS in the last 72 hours. Recent Labs     02/14/21 1944   INR 1.21*     No results for input(s): Maryl Peek in the last 72 hours. Urinalysis:      Lab Results   Component Value Date    NITRU POSITIVE 02/14/2021    WBCUA 21-50 02/14/2021    BACTERIA 4+ 02/14/2021    RBCUA  02/14/2021    BLOODU LARGE 02/14/2021    SPECGRAV 1.020 02/14/2021    GLUCOSEU Negative 02/14/2021       Radiology:  XR CHEST PORTABLE   Final Result      1. Slight progression of airspace disease in the right mid and upper lung zone. 2. Improved left mid airspace disease with decreased size of a small left pleural effusion. CT ABDOMEN PELVIS WO CONTRAST Additional Contrast? None   Final Result      1. Subcutaneous edema in the penis and scrotum which is nonspecific but may indicate cellulitis. No subcutaneous emphysema. 2. Cholelithiasis without evidence of acute cholecystitis. 3. Hypodense lesions in the right kidney are unchanged from the recent prior studies. 4. Left nephrolithiasis without hydronephrosis.    5. Moderate distention of the

## 2021-02-16 NOTE — PROGRESS NOTES
Occupational Therapy   Occupational Therapy Initial Assessment and Treatment Note   Date: 2021   Patient Name: Mayank Phillips  MRN: [id number]     : 1940    Date of Service: 2021    Discharge Recommendations:Tushar Block scored a 13/24 on the AM-PAC ADL Inpatient form. Current research shows that an AM-PAC score of 17 or less is typically not associated with a discharge to the patient's home setting. Based on the patient's AM-PAC score and their current ADL deficits, it is recommended that the patient have 3-5 sessions per week of Occupational Therapy at d/c to increase the patient's independence. Please see assessment section for further patient specific details. If patient discharges prior to next session this note will serve as a discharge summary. Please see below for the latest assessment towards goals. OT Equipment Recommendations  Equipment Needed: No  Other: defer to SNF    Assessment   Performance deficits / Impairments: Decreased functional mobility ; Decreased ADL status; Decreased endurance;Decreased cognition;Decreased safe awareness  Assessment: Pt from home with daughter - pt has been w/o HHA's x 1-2mos 2/2 foul odor and unkempt home. Pts daughter has cats and unwilling to maintain litter box therefore HHA refuse to come to assist pt with care. Pt demo needing increased / signifcant assist for functional transfers and ADLs. Pt appears confused and disoriented. Pt would benefit from inpt OT services at d/c. Pt will likely need increased assist / caregivers longterm.   Will follow as inpt  Treatment Diagnosis: impaired ADLs /functional transfers / decreased orientation 2/2 cystitis  Prognosis: Fair;Poor  Decision Making: Medium Complexity  OT Education: OT Role;Plan of Care  Patient Education: verb partial understanding - reinforce as needed  REQUIRES OT FOLLOW UP: YES  Activity Tolerance  Activity Tolerance: Patient limited by fatigue;Treatment limited secondary to decreased cognition  Activity Tolerance: Pt on 4L o2 -- FAYE and demo difficulty attending to tasks  Safety Devices  Safety Devices in place: Yes  Type of devices: Call light within reach; Chair alarm in place;Nurse notified; Left in chair           Patient Diagnosis(es): The primary encounter diagnosis was Urinary tract infection with hematuria, site unspecified. Diagnoses of ALIS (acute kidney injury) ([facility] [address] [facility] and Cellulitis of scrotum were also pertinent to this visit. has a past medical history of BPH (benign prostatic hyperplasia), Carotid stenosis, Cellulitis, Chronic back pain, Diverticular disease, Erectile dysfunction, GERD (gastroesophageal reflux disease), History of atrial fibrillation, Hypercholesteremia, Hypertension, Lower GI bleed, MDRO (multiple drug resistant organisms) resistance, Neuromuscular disorder ([facility] [facility] Renal insufficiency, Risk for falls, Tinea corporis, and Vitamin B12 deficiency. has a past surgical history that includes back surgery (2006); Foot surgery; Coronary artery bypass graft (12/13/2013); hip surgery (Right, 5/1/2015); Cystoscopy (N/A, 1/10/2019); Upper gastrointestinal endoscopy (N/A, 5/4/2020); and sigmoidoscopy (N/A, 6/11/2020). Treatment Diagnosis: impaired ADLs /functional transfers / decreased orientation 2/2 cystitis      Restrictions  Position Activity Restriction  Other position/activity restrictions: up with assistance,    Subjective   General  Chart Reviewed: Yes  Additional Pertinent Hx: Admit 2/14 with acute cystitis        CT Abd /pelvis - neg,                                                                PMHX: HLD, BPH, CABG, Chronic back pain, cellulitis  Family / Caregiver Present: No  Diagnosis: Acute cystitis  Subjective  Subjective: \" I feel okay\" pt sitting up in bed - Pt appears sleepy / confused this date.   Patient Currently in Pain: Denies  Vital Signs  Patient Currently in Pain: Denies    Social/Functional History  Social/Functional History  Lives With: Daughter  Type of Home: House  Home Layout: Multi-level, Able to Live on Main level with bedroom/bathroom  Home Access: Level entry  Bathroom Shower/Tub: (sponge bathes-reports aides haven't been in for over 2 months)  Bathroom Toilet: Bedside commode(reports he is able to empty)  Home Equipment: [employer], Lift chair  Receives Help From: Home health(RN coming-aides refused to come back due to cats/odor)  ADL Assistance: Needs assistance  Homemaking Assistance: Needs assistance  Ambulation Assistance: (non ambulatory)  Transfer Assistance: (able to pivot txfer to/from lift chair and wheelchair)  Active : No  Occupation: Retired  Additional Comments: Pt reports recent falls and no aides for over 2 months due to daughter's cats not being cared for properly. Pt is questionable historian. Objective  Treatment included functional transfer training, ADL's and pt. education. Orientation  Overall Orientation Status: Impaired  Orientation Level: Oriented to person;Disoriented to situation;Disoriented to time;Disoriented to place     Balance  Sitting Balance: Minimal assistance(pt needing increased cues for sitting upright)  Standing Balance: Dependent/Total(unable to stand upright at baseline -- sit / squat pivot only at home)  Functional Mobility  Functional Mobility Comments: unable to assess - pt uses w/c as mobility at home -- reports unable to get into w/c recently  Toilet Transfers  Equipment Used: Arsh Devika)  Toilet Transfer: Dependent/Total;Maximum assistance;2 Person assistance  Toilet Transfers Comments: Simulated to [facility] --- pt unable sit pivot with Max A x 2- Dep today  ADL  Feeding: Setup; Increased time to complete;Supervision  Grooming: Verbal cueing;Minimal assistance  LE Dressing: Dependent/Total(with donning socks, tubigrip socks.)  Toileting: Dependent/Total(schneider cath)  Tone RUE  RUE Tone: Normotonic  Tone LUE  LUE Tone: Normotonic  Coordination  Movements Are Fluid And Coordinated: Yes     Bed mobility  Rolling to Left: Maximum assistance(use of rail; max vc)  Supine to Sit: Maximum assistance(HOB up and use of rail; slow, effortful (pt sleeps in lift chair at home))  Scooting: Maximal assistance  Transfers  Sit Pivot Transfers: Dependent/Total(attempted x Max A x 2 from raised height bed)  Sit to stand: Unable to assess  Stand to sit: Unable to assess  Vision - Basic Assessment  Prior Vision: No visual deficits  Cognition  Overall Cognitive Status: Exceptions  Arousal/Alertness: Delayed responses to stimuli  Following Commands: Follows one step commands with repetition  Attention Span: Difficulty attending to directions  Memory: Decreased short term memory;Decreased recall of recent events  Safety Judgement: Decreased awareness of need for assistance;Decreased awareness of need for safety  Insights: Not aware of deficits  Initiation: Requires cues for some  Sequencing: Requires cues for some  Cognition Comment: pt demo decreased processing / orientation this date.   Pt has poor insight at baseline    LUE AROM (degrees)  LUE AROM : WFL  LUE General AROM: limited at end range 0-100 BUE , elbow / wrist / hands - WFL  Left Hand AROM (degrees)  Left Hand AROM: WFL  RUE AROM (degrees)  RUE AROM : WFL  RUE General AROM: limited at end range 0-100 BUE , elbow / wrist / hands - WFL  Right Hand AROM (degrees)  Right Hand AROM: WFL  LUE Strength  Gross LUE Strength: WFL  RUE Strength  Gross RUE Strength: WFL     Plan   Plan  Times per week: 2-5x  Times per day: Daily  Current Treatment Recommendations: Functional Mobility Training, Endurance Training, Safety Education & Training, Self-Care / ADL, Equipment Evaluation, Education, & procurement, Patient/Caregiver Education & Training    AM-PAC Score  AM-[facility] Inpatient Daily Activity Raw Score: 13 (02/16/21 1017)  -PAC Inpatient ADL T-Scale Score : 32.03 (02/16/21 1017)  ADL Inpatient CMS 0-100% Score: 63.03 (02/16/21 1017)  ADL Inpatient CMS G-Code Modifier : CL (02/16/21 1017)    Goals  Short term goals  Time Frame for Short term goals: at d/c  Short term goal 1: Sit at EOB x 7 mins with SBA for ADLs  Short term goal 2: Sit pivot transfer to chair / BSC with Mod A x 1  Short term goal 3: Grooming with setup  Short term goal 4: Oriented 3/4 attempts to environment  Patient Goals   Patient goals : Unable to state     Therapy Time   Individual Concurrent Group Co-treatment   Time In 0910         Time Out 0950         Minutes 40            Timed Code Treatment Minutes:   24 mins     Total Treatment Minutes:  40 mins       Evan Razo OT

## 2021-02-16 NOTE — PLAN OF CARE
Problem: Falls - Risk of:  Goal: Will remain free from falls  Description: Will remain free from falls  Outcome: Ongoing  Note: Patient remains free from physical injury. Fall precautions in place: Patient in bed lowest position,wheels locked. 2/4 side rails up Call light and beside table within reach. Will continue to monitor       Problem: Skin Integrity:  Goal: Absence of new skin breakdown  Description: Absence of new skin breakdown  Outcome: Ongoing  Note: Skin assessment done this shift. No new sign of skin breakdown noted. Patient Q2h turn/reposition. Venelex applied to affected area. Patient on speciality mattress.  Will continue to monitor

## 2021-02-16 NOTE — PROGRESS NOTES
Pt alert and oriented. Vsstable. C/o pain. Repositioned and did not need pain medication at the time. Pt sitting up in chair, will need maxi pola to put back to bed. Pt no new skin issues noted. Pt will continue to check, turn, and change. Pt also on speciality bed. Will continue to monitor.

## 2021-02-16 NOTE — PROGRESS NOTES
Physical Therapy    Facility/Department: [facility]  Initial Assessment    NAME: Edgar Don  : 1940  MRN: [id number]    Date of Service: 2021    Discharge Recommendations:Tushar Block scored a  on the AM-PAC short mobility form. Current research shows that an AM-PAC score of 17 or less is typically not associated with a discharge to the patient's home setting. Based on the patient's AM-PAC score and their current functional mobility deficits, it is recommended that the patient have 3-5 sessions per week of Physical Therapy at d/c to increase the patient's independence. Please see assessment section for further patient specific details. If patient discharges prior to next session this note will serve as a discharge summary. Please see below for the latest assessment towards goals. PT Equipment Recommendations  Equipment Needed: No    Assessment   Assessment: 80 yo admitted for cystitis/hematuria. Pt demo mobility well below his baseline of independent with pivot txfers from his lift chair at home. Pt claudia decreased insight into safety awareness/living conditions at home as he reports he has not been bathing for over 2 months since aides refuse to come into his home due to issues with daughter's cats not being cared for properly. Pt agreeable to SNF at discharge. Pt would benefit from continued skilled IP PT at discharge to maximize his functional independence prior to d/c home. Recommend nursing utilize maximover to get pt OOB PRN and often. Discussed with RN  Treatment Diagnosis: mobility impairment due to cystitis  Decision Making: High Complexity  Patient Education: Pt educated on PT role, importance of OOB mobility, need to call for assist to get up and he verbalized understanding but will need reinforcement. REQUIRES PT FOLLOW UP: Yes  Activity Tolerance  Activity Tolerance: Patient limited by endurance; Patient limited by cognitive status       Patient Diagnosis(es): The primary encounter diagnosis was Urinary tract infection with hematuria, site unspecified. Diagnoses of ALIS (acute kidney injury) ([facility] [address] and Cellulitis of scrotum were also pertinent to this visit. has a past medical history of BPH (benign prostatic hyperplasia), Carotid stenosis, Cellulitis, Chronic back pain, Diverticular disease, Erectile dysfunction, GERD (gastroesophageal reflux disease), History of atrial fibrillation, Hypercholesteremia, Hypertension, Lower GI bleed, MDRO (multiple drug resistant organisms) resistance, Neuromuscular disorder ([address] Renal insufficiency, Risk for falls, Tinea corporis, and Vitamin B12 deficiency. has a past surgical history that includes back surgery (); Foot surgery; Coronary artery bypass graft (2013); hip surgery (Right, 2015); Cystoscopy (N/A, 1/10/2019); Upper gastrointestinal endoscopy (N/A, 2020); and sigmoidoscopy (N/A, 2020). Restrictions  Position Activity Restriction  Other position/activity restrictions: up with assistance,     Vision/Hearing  Vision: Within Functional Limits  Hearing: Exceptions to [facility]  Hearing Exceptions: Hard of hearing/hearing concerns       Subjective  General  Chart Reviewed: Yes  Additional Pertinent Hx: 79y.o. year-old male with a history of hypertension, hyperlipidemia, CAD s/p CABG x 5 and a chronic indwelling Iglesias catheter presented to ED 21 for acute cystitis/hematuria/ARF on CKD 3. CT abd/pelvis positive cellulitis. Family / Caregiver Present: No  Diagnosis: acute cystitis with hematuria  Follows Commands: Within Functional Limits  Subjective  Subjective: Pt found supine in bed and agreeable to PT with encouragement. \" I'm not doing so good.  \"  Pain Screening  Patient Currently in Pain: Denies         Orientation  Orientation  Overall Orientation Status: (oriented to name/, \"hospital\",date) ; poor memory this am    Social/Functional History  Social/Functional History  Lives With: Daughter  Type of Home: House  Home Layout: Multi-level, Able to Live on Main level with bedroom/bathroom  Home Access: Level entry  Bathroom Shower/Tub: (sponge bathes-reports aides haven't been in for over 2 months)  Bathroom Toilet: Bedside commode(reports he is able to empty)  Home Equipment: [employer], Lift chair  Receives Help From: Home health(RN coming-aides refused to come back due to cats/odor)  ADL Assistance: Needs assistance  Homemaking Assistance: Needs assistance  Ambulation Assistance: (non ambulatory)  Transfer Assistance: (able to pivot txfer to/from lift chair and wheelchair)  Active : No  Occupation: Retired  Additional Comments: Pt reports recent falls and no aides for over 2 months due to daughter's cats not being cared for properly. Pt is questionable historian.        Objective          AROM RLE (degrees)  RLE AROM: (hip/knee flexion to 90 degrees; ankle DF to neutral)  RLE General AROM: max edema throughout  AROM LLE (degrees)  LLE AROM : (hip/knee flexion to 90 degrees; ankle DF to neutral)  LLE General AROM: max edema throughout     Strength RLE  Strength RLE: (2+/5 grossly throughout)  Strength LLE  Strength LLE: (2+/5 grossly throughout)        Bed mobility  Rolling to Left: Maximum assistance(use of rail; max vc)  Supine to Sit: Maximum assistance(HOB up and use of rail; slow, effortful (pt sleeps in lift chair at home))  Scooting: Maximal assistance     Transfers  Sit to Stand: Dependent/Total(dependent x2 from raised ht bed for partial stand with B UE hand hold of CURTIS stedy; pt unable to attain full stance; x2 trials with CURTIS stedy, x1 trial without CURTIS stedy)  Stand to sit: Dependent/Total(max assist x2 with uncontrolled descension; x2 trials with CURTIS stedy, x1 trial without CURTIS stedy)  Lateral Transfers: 2 Person Assistance(dependent x2 via CURTIS stedy)-FAYE on 4L 02        Balance  Sitting - Static: Fair;+  Sitting - Dynamic: Fair;-  Comments: Pt demo LOB posterior inconsistently seated EOB (air mattress).  Pt able to sit EOB x10 mins with CGA overall with periods of min assist.        Plan   Plan  Times per week: 2-5  Current Treatment Recommendations: Strengthening, Transfer Training, Balance Training, Functional Mobility Training  Safety Devices  Type of devices: Nurse notified, Call light within reach, Chair alarm in place, Left in chair(pt reclined)                          AM-PAC Score  AM-PAC Inpatient Mobility Raw Score : 8 (02/16/21 1004)  AM-PAC Inpatient T-Scale Score : 28.52 (02/16/21 1004)  Mobility Inpatient CMS 0-100% Score: 86.62 (02/16/21 1004)  Mobility Inpatient CMS G-Code Modifier : CM (02/16/21 1004)          Goals  Short term goals  Time Frame for Short term goals: discharge  Short term goal 1: bed to chair min assist  Short term goal 2: independent LE HEP x10 reps  Patient Goals   Patient goals : return home       Therapy Time   Individual Concurrent Group Co-treatment   Time In 0900         Time Out 0939         Minutes 39           Timed Code Treatment Minutes: 29      Total Treatment Minutes:  Esequiel 94, PT

## 2021-02-16 NOTE — PROGRESS NOTES
Patient alert and oriented. VSS Patient denies any pain or nausea. Patient c/o insomnia. Ambien and Melatonin given per protocol. Iglesias in place draining yellow cloudy urine. PO meds taken without any difficulties. Patient in bed lowest position call light and bedside table within reach. All needs are met at this time. Patient aware to call if any help needed. Will continue to monitor  'BP (!) 143/70   Pulse 96   Temp 97.8 °F (36.6 °C) (Axillary)   Resp 18   Ht 5' 11\" (1.803 m)   Wt 218 lb 4.1 oz (99 kg)   SpO2 98%   BMI 30.44 kg/m²

## 2021-02-16 NOTE — PROGRESS NOTES
Clinical Pharmacy Progress Note    Admit date: 2/14/2021     Subjective/Objective:  Pt is a 72yom with PMHx that includes HTN, CAD s/p CABG x5, GERD, carotid stenosis, BPH, chronic back pain, spasticity, HLD, A.fib, CKD (baseline appears to be ~1.2-1.3), and recurrent UTI's with MDRO's who is admitted with penile bleeding after pulling his chronic schneider out - found to have UTI and ALIS. Interval update:  Urine & Blood cultures pending. SCr remains elevated at 2.1. Pharmacy is consulted to dose Vancomycin per Dr. Ema Durand    Current antibiotics:  Cefepime 1g IV q12h (2/15-current) - day #2  Vancomycin - Pharmacy to dose - day #2   Intermittent dosing (2/15-current)    Date Vancomycin level Vancomycin dose   2/15   13.3mcg/mL @ 09:07 1.25g @ 02:00  750mg @ 14:11   2/16 9.5 mcg/mL             Recent Labs     02/15/21  0516 02/16/21  0510    138   K 4.0 4.3    105   CO2 24 25   BUN 54* 50*   CREATININE 2.0* 2.1*   GLUCOSE 134* 101*       Estimated Creatinine Clearance: 34 mL/min (A) (based on SCr of 2.1 mg/dL (H)). Lab Results   Component Value Date    WBC 7.9 02/16/2021    HGB 8.9 (L) 02/16/2021    HCT 26.3 (L) 02/16/2021    MCV 93.6 02/16/2021     (L) 02/16/2021       Lab Results   Component Value Date    PROTIME 14.1 (H) 02/14/2021    INR 1.21 (H) 02/14/2021       Height:  5' 11\" (180.3 cm)  Weight:  218 lb 4.1 oz (99 kg)    Culture results:  Blood (2/15) = No growth to date  Urine (2/15) = sent    Prophylaxis:  VTE:  Heparin subcut  GI:  Pantoprazole      Assessment/Plan:  1)  UTI:  Cefepime + Vancomycin - day #2  · Cefepime -   Current dose remains appropriate based on indication and current renal function. Will continue to monitor and adjust as needed per [facility] Renal Dose Adjustment Policy. · Vancomycin - Pharmacy to dose  · Will dose intermittently based on levels given ALIS on CKD. · Random level this AM = 9.5mcg/mL. Will give 1.5g IV x1 today.   Will check random level in AM 2/17.  · Clinical condition will be monitored closely, and levels / doses will be ordered as appropriate.     Please call with questions--  Thanks--  Troy Montenegro, PharmD, [address]  [id number] (\[facility] [place] [facility]\"")   2/16/2021 8:37 AM

## 2021-02-16 NOTE — CARE COORDINATION
Cm following, spoke with pt after PT RUDI lona agrees to go to [facility] for SNF at [address] [place] [address] started with [employer]. Electronically signed by Gaudencio Hugo RN on 2/16/2021 at 11:42 AM  [phone]    UPDATE:  Lu Jeffrey3 obtained from [address] [place] [address] Rd JUT#[id number] for [facility] precert good through 7/29 then needs new review,  BAYRON Suarez n# [phone] fax# [phone].    Electronically signed by Gaudencio Hugo RN on 2/16/2021 at 3:36 PM  [phone]

## 2021-02-17 LAB
ANION GAP SERPL CALCULATED.3IONS-SCNC: 8 MMOL/L (ref 3–16)
ANISOCYTOSIS: ABNORMAL
BASOPHILS ABSOLUTE: 0 K/UL (ref 0–0.2)
BASOPHILS RELATIVE PERCENT: 0 %
BUN BLDV-MCNC: 48 MG/DL (ref 7–20)
CALCIUM SERPL-MCNC: 8.4 MG/DL (ref 8.3–10.6)
CHLORIDE BLD-SCNC: 104 MMOL/L (ref 99–110)
CO2: 25 MMOL/L (ref 21–32)
CREAT SERPL-MCNC: 1.9 MG/DL (ref 0.8–1.3)
EOSINOPHILS ABSOLUTE: 0.2 K/UL (ref 0–0.6)
EOSINOPHILS RELATIVE PERCENT: 4 %
GFR AFRICAN AMERICAN: 42
GFR NON-AFRICAN AMERICAN: 34
GLUCOSE BLD-MCNC: 101 MG/DL (ref 70–99)
HCT VFR BLD CALC: 25.9 % (ref 40.5–52.5)
HEMOGLOBIN: 8.6 G/DL (ref 13.5–17.5)
LYMPHOCYTES ABSOLUTE: 0.7 K/UL (ref 1–5.1)
LYMPHOCYTES RELATIVE PERCENT: 12 %
MCH RBC QN AUTO: 31.1 PG (ref 26–34)
MCHC RBC AUTO-ENTMCNC: 33.3 G/DL (ref 31–36)
MCV RBC AUTO: 93.4 FL (ref 80–100)
MONOCYTES ABSOLUTE: 0.4 K/UL (ref 0–1.3)
MONOCYTES RELATIVE PERCENT: 6 %
NEUTROPHILS ABSOLUTE: 4.8 K/UL (ref 1.7–7.7)
NEUTROPHILS RELATIVE PERCENT: 78 %
ORGANISM: ABNORMAL
PDW BLD-RTO: 16 % (ref 12.4–15.4)
PLATELET # BLD: 128 K/UL (ref 135–450)
PMV BLD AUTO: 8.7 FL (ref 5–10.5)
POTASSIUM REFLEX MAGNESIUM: 4.8 MMOL/L (ref 3.5–5.1)
RBC # BLD: 2.77 M/UL (ref 4.2–5.9)
SODIUM BLD-SCNC: 137 MMOL/L (ref 136–145)
URINE CULTURE, ROUTINE: ABNORMAL
VANCOMYCIN RANDOM: 13.9 UG/ML
WBC # BLD: 6.1 K/UL (ref 4–11)

## 2021-02-17 PROCEDURE — 6360000002 HC RX W HCPCS: Performed by: INTERNAL MEDICINE

## 2021-02-17 PROCEDURE — 6370000000 HC RX 637 (ALT 250 FOR IP): Performed by: INTERNAL MEDICINE

## 2021-02-17 PROCEDURE — 80048 BASIC METABOLIC PNL TOTAL CA: CPT

## 2021-02-17 PROCEDURE — 80202 ASSAY OF VANCOMYCIN: CPT

## 2021-02-17 PROCEDURE — 1200000000 HC SEMI PRIVATE

## 2021-02-17 PROCEDURE — 36415 COLL VENOUS BLD VENIPUNCTURE: CPT

## 2021-02-17 PROCEDURE — 2580000003 HC RX 258: Performed by: INTERNAL MEDICINE

## 2021-02-17 PROCEDURE — 85025 COMPLETE CBC W/AUTO DIFF WBC: CPT

## 2021-02-17 PROCEDURE — 92526 ORAL FUNCTION THERAPY: CPT

## 2021-02-17 RX ORDER — SODIUM CHLORIDE, SODIUM LACTATE, POTASSIUM CHLORIDE, AND CALCIUM CHLORIDE .6; .31; .03; .02 G/100ML; G/100ML; G/100ML; G/100ML
500 INJECTION, SOLUTION INTRAVENOUS ONCE
Status: COMPLETED | OUTPATIENT
Start: 2021-02-17 | End: 2021-02-17

## 2021-02-17 RX ADMIN — ACETAMINOPHEN 650 MG: 325 TABLET ORAL at 19:53

## 2021-02-17 RX ADMIN — HEPARIN SODIUM 5000 UNITS: 5000 INJECTION INTRAVENOUS; SUBCUTANEOUS at 13:57

## 2021-02-17 RX ADMIN — HEPARIN SODIUM 5000 UNITS: 5000 INJECTION INTRAVENOUS; SUBCUTANEOUS at 21:35

## 2021-02-17 RX ADMIN — VANCOMYCIN HYDROCHLORIDE 1250 MG: 10 INJECTION, POWDER, LYOPHILIZED, FOR SOLUTION INTRAVENOUS at 11:29

## 2021-02-17 RX ADMIN — GABAPENTIN 400 MG: 400 CAPSULE ORAL at 08:47

## 2021-02-17 RX ADMIN — Medication 10 ML: at 08:48

## 2021-02-17 RX ADMIN — DOCUSATE SODIUM 100 MG: 100 CAPSULE, LIQUID FILLED ORAL at 21:33

## 2021-02-17 RX ADMIN — DOCUSATE SODIUM 100 MG: 100 CAPSULE, LIQUID FILLED ORAL at 08:47

## 2021-02-17 RX ADMIN — GABAPENTIN 400 MG: 400 CAPSULE ORAL at 21:30

## 2021-02-17 RX ADMIN — AMPICILLIN SODIUM AND SULBACTAM SODIUM 1500 MG: 1; .5 INJECTION, POWDER, FOR SOLUTION INTRAMUSCULAR; INTRAVENOUS at 17:02

## 2021-02-17 RX ADMIN — CASTOR OIL AND BALSAM, PERU: 788; 87 OINTMENT TOPICAL at 21:32

## 2021-02-17 RX ADMIN — HEPARIN SODIUM 5000 UNITS: 5000 INJECTION INTRAVENOUS; SUBCUTANEOUS at 06:30

## 2021-02-17 RX ADMIN — ASPIRIN 81 MG: 81 TABLET, CHEWABLE ORAL at 08:47

## 2021-02-17 RX ADMIN — ATORVASTATIN CALCIUM 80 MG: 80 TABLET, FILM COATED ORAL at 21:32

## 2021-02-17 RX ADMIN — Medication 10 ML: at 21:33

## 2021-02-17 RX ADMIN — AMPICILLIN SODIUM AND SULBACTAM SODIUM 1500 MG: 1; .5 INJECTION, POWDER, FOR SOLUTION INTRAMUSCULAR; INTRAVENOUS at 13:28

## 2021-02-17 RX ADMIN — SODIUM CHLORIDE, POTASSIUM CHLORIDE, SODIUM LACTATE AND CALCIUM CHLORIDE 500 ML: 600; 310; 30; 20 INJECTION, SOLUTION INTRAVENOUS at 10:20

## 2021-02-17 RX ADMIN — Medication 10 MG: at 00:01

## 2021-02-17 RX ADMIN — TAMSULOSIN HYDROCHLORIDE 0.8 MG: 0.4 CAPSULE ORAL at 08:47

## 2021-02-17 RX ADMIN — PANTOPRAZOLE SODIUM 40 MG: 40 TABLET, DELAYED RELEASE ORAL at 06:31

## 2021-02-17 RX ADMIN — CASTOR OIL AND BALSAM, PERU: 788; 87 OINTMENT TOPICAL at 10:21

## 2021-02-17 NOTE — PROGRESS NOTES
Hospitalist Progress Note      PCP: INES BLANDON [address]    Date of Admission: 2/14/2021    Chief Complaint:     Chief Complaint   Patient presents with    Other     pt ripped out catheter and is bleeding from penis; home health nurse sent; alexad also states that pt is not taking care of himself at home; poor living conditions        Subjective:  Patient seen and examined at the bedside. No complaints at this time.   Urine culture growing enterococcus, we have sensitivities  On D3 Vancomycin, plan for 5 days total  Still coughing up some phlegm and on 2L NC, will start Unasyn for aspiration PNA    PFHS: reviewed as documented 2/14/2021, no changes    Medications:  Reviewed    Infusion Medications   Scheduled Medications    vancomycin  1,250 mg Intravenous Once    tamsulosin  0.8 mg Oral Daily    pantoprazole  40 mg Oral QAM AC    docusate sodium  100 mg Oral BID    atorvastatin  80 mg Oral Nightly    aspirin  81 mg Oral Daily    sodium chloride flush  10 mL Intravenous 2 times per day    heparin (porcine)  5,000 Units Subcutaneous 3 times per day    vancomycin (VANCOCIN) intermittent dosing (placeholder)   Other See Admin Instructions    Venelex   Topical BID    gabapentin  400 mg Oral BID     PRN Meds: zolpidem, melatonin, sodium chloride flush, ondansetron, polyethylene glycol, acetaminophen **OR** acetaminophen      Intake/Output Summary (Last 24 hours) at 2/17/2021 0943  Last data filed at 2/17/2021 0901  Gross per 24 hour   Intake 1380 ml   Output 1585 ml   Net -205 ml       Physical Exam    /72   Pulse 85   Temp 99 °F (37.2 °C)   Resp 18   Ht 5' 11\" (1.803 m)   Wt 218 lb 4.1 oz (99 kg)   SpO2 96%   BMI 30.44 kg/m²     General appearance:  No acute distress, appears stated age  Eyes: Pupils equal, round, reactive to light, conjunctiva/corneas clear  Ears/Nose/Mouth/Throat: No external lesions or scars, hearing intact to voice  Neck: Trachea midline, no masses noted, no thyromegaly  Respiratory:  Non-labored breathing, clear to auscultation bilaterally  Cardiovascular: Regular rate and rhythm, no murmurs, gallops, or rubs  Abdomen: soft, non-tender, non-distended  Musculoskeletal: Warm, well perfused, no cyanosis or edema  Skin: normal color, no wounds noted  Psychiatric: A&Ox4, good insight and judgment    Labs:   Recent Labs     02/15/21  0516 02/16/21 0510 02/17/21 0522   WBC 8.5 7.9 6.1   HGB 10.3* 8.9* 8.6*   HCT 31.5* 26.3* 25.9*   * 112* 128*     Recent Labs     02/15/21  0516 02/16/21 0510 02/17/21  0522    138 137   K 4.0 4.3 4.8    105 104   CO2 24 25 25   BUN 54* 50* 48*   CREATININE 2.0* 2.1* 1.9*   CALCIUM 8.6 8.2* 8.4     No results for input(s): AST, ALT, BILIDIR, BILITOT, ALKPHOS in the last 72 hours. Recent Labs     02/14/21  1944   INR 1.21*     No results for input(s): Hermelinda Lacks in the last 72 hours. Urinalysis:      Lab Results   Component Value Date    NITRU POSITIVE 02/14/2021    WBCUA 21-50 02/14/2021    BACTERIA 4+ 02/14/2021    RBCUA  02/14/2021    BLOODU LARGE 02/14/2021    SPECGRAV 1.020 02/14/2021    GLUCOSEU Negative 02/14/2021       Radiology:  XR CHEST PORTABLE   Final Result      1. Slight progression of airspace disease in the right mid and upper lung zone. 2. Improved left mid airspace disease with decreased size of a small left pleural effusion. CT ABDOMEN PELVIS WO CONTRAST Additional Contrast? None   Final Result      1. Subcutaneous edema in the penis and scrotum which is nonspecific but may indicate cellulitis. No subcutaneous emphysema. 2. Cholelithiasis without evidence of acute cholecystitis. 3. Hypodense lesions in the right kidney are unchanged from the recent prior studies. 4. Left nephrolithiasis without hydronephrosis. 5. Moderate distention of the rectum. 6. Moderate hiatal hernia.           Assessment/Plan:    Active Hospital Problems    Diagnosis Date Noted    Acute renal failure superimposed on stage 3 chronic kidney disease ([facility] [address] [N17.9, N18.30] 02/15/2021    [facility] with [facility] [S86.613T, N39.0] 02/15/2021    Acute cystitis with hematuria [N30.01] 02/14/2021    Hyperlipidemia [E78.5] 11/20/2019    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] 01/27/2014    CAD (coronary artery disease) [I25.10] 01/27/2014    Essential hypertension [I10] 12/16/2013       Plan:    # Acute cystitis  # Hematuria  -due to pulling out schneider  -urine culture growing enterococcus, sensitivities back  -initial vancomycin/cefepime, now only vancomycin, 2/17 is D3  -plan for 5d course     # Possible aspiration  -O2 requirement stable at 2L  -Start Unasyn     # CAD  -s/p CABG x5  -continue asa, statin     # ALIS on CKD 3  -hold lasix initially w/ gentle fluids  -Cr stable     # HTN  -not on home meds     # HLD  -continue statin    DVT Prophylaxis: Heparin  Diet: DIET DENTAL SOFT; Dental Soft  Dietary Nutrition Supplements: Standard High Calorie Oral Supplement  Code Status: Full Code    PT/OT Eval Status: Ongoing    Dispo: [facility] pending clinical improvement    Gilda Browning MD

## 2021-02-17 NOTE — PROGRESS NOTES
Clinical Pharmacy Progress Note    Admit date: 2/14/2021     Subjective/Objective:  Pt is a 72yom with PMHx that includes HTN, CAD s/p CABG x5, GERD, carotid stenosis, BPH, chronic back pain, spasticity, HLD, A.fib, CKD (baseline appears to be ~1.2-1.3), and recurrent UTI's with MDRO's who is admitted with penile bleeding after pulling his chronic schneider out - found to have UTI and ALIS. Interval update:  Urine culture growing Enterococcus, sensitive to Ampicillin & Vanc. Had possible aspiration event yesterday - monitoring for now. Pharmacy is consulted to dose Vancomycin per Dr. Alejandra Lindsey    Current antibiotics:   (2/15-2/16)  Vancomycin - Pharmacy to dose - day #3   Intermittent dosing (2/15-current)    Date Vancomycin level Vancomycin dose   2/15   13.3mcg/mL @ 09:07 1.25g @ 02:00  750mg @ 14:11   2/16 9.5 mcg/mL 1.5g   2/17 13.9 mcg/mL        Recent Labs     02/16/21  0510 02/17/21  0522    137   K 4.3 4.8    104   CO2 25 25   BUN 50* 48*   CREATININE 2.1* 1.9*   GLUCOSE 101* 101*       Estimated Creatinine Clearance: 38 mL/min (A) (based on SCr of 1.9 mg/dL (H)). Lab Results   Component Value Date    WBC 6.1 02/17/2021    HGB 8.6 (L) 02/17/2021    HCT 25.9 (L) 02/17/2021    MCV 93.4 02/17/2021     (L) 02/17/2021       Lab Results   Component Value Date    PROTIME 14.1 (H) 02/14/2021    INR 1.21 (H) 02/14/2021       Height:  5' 11\" (180.3 cm)  Weight:  218 lb 4.1 oz (99 kg)    Culture results:  Blood (2/15) = No growth to date  Urine (2/15) = >100k Enterococcus faecalis, sensitive to Ampicillin, Vancomycin    Prophylaxis:  VTE:  Heparin subcut  GI:  Pantoprazole      Assessment/Plan:  1)  UTI:  Vancomycin - day #3  · Vancomycin - Pharmacy to dose  · Will dose intermittently based on levels given ALIS on CKD. · Random level this AM = 13.9mcg/mL. Will give 1.25g IV x1 today. Will check random level in AM 2/18.   · Clinical condition will be monitored closely, and levels / doses will be ordered as appropriate. · As urine culture is growing Enterococcus sensitive to Ampicillin, recommend de-escalating Vanc to Amoxicillin 500mg po q8h. Could also use Augmentin 500-875mg po BID if also want coverage for possible aspiration pneumonia.     Please call with questions--  Thanks--  William Hays, PharmD, [address]  [id number] ([facility])   2/17/2021 8:40 AM

## 2021-02-17 NOTE — PROGRESS NOTES
Patient alert and oriented. VSS Patient denies any pain. PO meds taken without any difficulties. Iglesias in place draining yellow urine. Patient turned and repositioned q2h Patient in bed lowest position call light and bedside table within reach. All needs are met at this time. Patient aware to call if any help needed. Will continue to monitor  .

## 2021-02-17 NOTE — CARE COORDINATION
CM following, pt plans to DC to [facility] obtained and good through 2/18 with out clinical updates. Pt on 2L O2 started Unasyn for poss asp and SLP eval, pt on  Dose 3/5 of Vanc for +Urine cx. Plans to [place]F at [place].   Electronically signed by Liset Roca RN on 2/17/2021 at 1:15 PM  [phone]

## 2021-02-17 NOTE — PROGRESS NOTES
Speech Language Pathology  Facility/Department: [address] [facility] SURGERY  Dysphagia Daily Treatment Note    NAME: Edith Sadler  : 1940  MRN: [id number]    Patient Diagnosis(es):   Patient Active Problem List    Diagnosis Date Noted    Acute renal failure superimposed on stage 3 chronic kidney disease ([address] 02/15/2021    Urinary tract infection associated with indwelling urethral catheter ([address] 02/15/2021    Acute cystitis with hematuria 2021    Abnormal angiogram 2020    H/O angiography 2020    Abnormal stress test 2020    Constipation 2020    Encopresis with constipation and overflow incontinence 2020    Cellulitis of scrotum 2020    Acute renal injury ([address] 2020    Pseudomonas infection 2020    Dyspnea     Bradycardia     CHF with unknown LVEF ([address] 2020    Gross hematuria 2019    Chronic indwelling Iglesias catheter 2019    Hyperlipidemia 2019    Bilateral lower leg cellulitis 2019    Neurogenic bladder 2019    Bacteriuria 2019    Abnormality of urethral meatus 2019    Acute encephalopathy 10/08/2019    Problem with urinary catheter ([address] 10/07/2019    Edema     Complicated UTI (urinary tract infection) 2019    Hypothermia 2019    Acute low back pain without sciatica 2017    Hip fracture, right ([address] 2015    Acute right hip pain     Lower GI bleeding 11/10/2014    CAD (coronary artery disease) 2014    S/P CABG x 5 2014    Cellulitis 2013    Essential hypertension 2013    GERD (gastroesophageal reflux disease) 2013    Acute blood loss anemia 2013    Tinea corporis 2013    Carotid stenosis 2013    Hypotension 2013    Light headed 2013     Allergies: Allergies   Allergen Reactions    Bactrim [Sulfamethoxazole-Trimethoprim] Rash       CXR (2/15/21)-  Impression       1. Slight progression of airspace disease in the right mid and upper lung zone. 2. Improved left mid airspace disease with decreased size of a small left pleural effusion. Previous MBS - NA    Chart reviewed. Medical Diagnosis: UTI  Treatment Diagnosis: Dysphagia    BSE Impression (2/15/21)-  Oropharyngeal swallow appears grossly WFLs at this time; pt noted to have intermittent throat clear regardless of PO, and endorsed onset after aspiration event during breakfast. Suspect irritation and increased secretions for protection. No observed difficulties during evaluation; no overt s/s of aspiration w/ any trials. Completed 3oz water test w/o difficulties. Pt endorsed consuming softer foods at home, so recommend dental soft + thin liquids at this time w/ close monitoring of respiratory status and s/s of aspiration. Pt endorsed a \"rattling\" sensation in his throat which he characterized as vocal roughness; suspect may be d/t weakness and high levels of oxygen. Will continue to monitor. MBS results - NA  Ongoing monitoring for instrumental warranted    Pain: Denied    Current Diet : Dental soft + thin liquids; small, single sips    Treatment:  Pt seen bedside to address the following goals:  1- The patient will tolerate recommended diet without observed clinical signs of aspiration  2/16: Pt observed w/ breakfast tray this date consisting of soft solid + thin liquids. Pt positioned upright in bed and agreeable to meal; stating at 96 O2 consistency w/ 5L NC. Pt requires assist feeds d/t UE weakness. Consumed all consistencies w/o overt s/s of aspiration; no impulsivity w/ thin liquids and noted to take small, single sips. No throat clear or change in vocal quality. Noted to have anterior spillage x1 w/ soft solid; independently cleared and endorsed \"distraction\" d/t conversation. Cont goal  2/17: Per chart review, pt with possible aspiration incident yesterday.  Discussed with RN, who states pt has been tolerating PO intake well this date. Received pt awake, alert, pleasant, on 2L O2 via nc. Pt denies difficulty swallowing, stating he has not been having any difficulty swallowing yesterday or today, states he has been doing well with the soft solid diet. Analyzed tolerance thins via cup and puree solid (pt declined other PO options), with positive oral acceptance, effective oral prep, positive swallow movement, no overt signs of aspiration or penetration. Given concern for possible aspiration incident yesterday, recommend follow-up once more. Cont goal.    2- The patient/caregiver will demonstrate understanding of compensatory strategies for improved swallowing safety. 2/16: Pt w/ independent recall of diet recommendation and use of small sips; endorsed seeing it on the whiteboard and using it to assist during meals. Noted to have small, single sips w/o additional cues required. Pt expressed understanding of overt s/s of aspiration, and endorsed no difficulties since breakfast yesterday. Cont goal  2/17: Reviewed dysphagia, swallow function, aspiration concern, and general safe swallow strategies (upright position during PO intake, small sips, slow rate). Pt stated comprehension. Cont goal.    Patient/Family/Caregiver Education:  See the above. Compensatory Strategies:  Upright during all meals  Oral care  Small sips       Plan:  Continued daily Dysphagia treatment with goals per plan of care. Diet recommendations: Dental soft + thin liquids  DC recommendation: TBD  Treatment: 19  D/W Nena winkler  Needs met prior to leaving room, call button in reach. Melissa Trivedi M.A., Jemma .[phone]  Speech-Language Pathologist  Pg.  # [phone]    If patient is discharged prior to next treatment, this note will serve as the discharge summary

## 2021-02-17 NOTE — PLAN OF CARE
Problem: Falls - Risk of:  Goal: Will remain free from falls  Description: Will remain free from falls  2/17/2021 1524 by Tia Veras RN  Outcome: Ongoing  Note: Pt in bed with bed alarm engaged. Call light and bed side table within reach. Will continue to monitor.

## 2021-02-17 NOTE — PLAN OF CARE
Problem: Falls - Risk of:  Goal: Will remain free from falls  Description: Will remain free from falls  Outcome: Ongoing  Note: Patient remains free from physical injury. Fall precautions in place: Patient in bed lowest position,wheels locked. 2/4 side rails up Call light and beside table within reach. Will continue to monitor       Problem: Skin Integrity:  Goal: Absence of new skin breakdown  Description: Absence of new skin breakdown  Outcome: Ongoing  Note: Skin assessment done this shift. No new sign of skin breakdown noted. Patient turned and repositioned Q2h, venelex applied to buttocks. Patient on speciality mattress.  Will continue to monitor

## 2021-02-17 NOTE — PROGRESS NOTES
Pts Daughter Machuca Kather[place]on called and updated on new visiting hours. Also, updated on Pts status. Pt currently tolerating PO food and fluids. Iglesias catheter in place. Q2 turn. VSS. 2Lnc. Denies pain. Increased frequency of rounds.      Electronically signed by Flory Humphrey RN on 2/17/2021 at 3:24 PM

## 2021-02-18 ENCOUNTER — APPOINTMENT (OUTPATIENT)
Dept: GENERAL RADIOLOGY | Age: 81
DRG: 698 | End: 2021-02-18
Payer: MEDICARE

## 2021-02-18 LAB
ANION GAP SERPL CALCULATED.3IONS-SCNC: 8 MMOL/L (ref 3–16)
BASOPHILS ABSOLUTE: 0 K/UL (ref 0–0.2)
BASOPHILS RELATIVE PERCENT: 0.6 %
BUN BLDV-MCNC: 47 MG/DL (ref 7–20)
CALCIUM SERPL-MCNC: 8.2 MG/DL (ref 8.3–10.6)
CHLORIDE BLD-SCNC: 109 MMOL/L (ref 99–110)
CO2: 25 MMOL/L (ref 21–32)
CREAT SERPL-MCNC: 1.7 MG/DL (ref 0.8–1.3)
EOSINOPHILS ABSOLUTE: 0.3 K/UL (ref 0–0.6)
EOSINOPHILS RELATIVE PERCENT: 5 %
GFR AFRICAN AMERICAN: 47
GFR NON-AFRICAN AMERICAN: 39
GLUCOSE BLD-MCNC: 94 MG/DL (ref 70–99)
HCT VFR BLD CALC: 27 % (ref 40.5–52.5)
HEMOGLOBIN: 8.9 G/DL (ref 13.5–17.5)
LYMPHOCYTES ABSOLUTE: 1.4 K/UL (ref 1–5.1)
LYMPHOCYTES RELATIVE PERCENT: 21.7 %
MCH RBC QN AUTO: 30.6 PG (ref 26–34)
MCHC RBC AUTO-ENTMCNC: 32.7 G/DL (ref 31–36)
MCV RBC AUTO: 93.4 FL (ref 80–100)
MONOCYTES ABSOLUTE: 0.9 K/UL (ref 0–1.3)
MONOCYTES RELATIVE PERCENT: 13.2 %
NEUTROPHILS ABSOLUTE: 3.8 K/UL (ref 1.7–7.7)
NEUTROPHILS RELATIVE PERCENT: 59.5 %
PDW BLD-RTO: 16 % (ref 12.4–15.4)
PLATELET # BLD: 110 K/UL (ref 135–450)
PMV BLD AUTO: 8 FL (ref 5–10.5)
POTASSIUM REFLEX MAGNESIUM: 4.6 MMOL/L (ref 3.5–5.1)
RBC # BLD: 2.9 M/UL (ref 4.2–5.9)
SODIUM BLD-SCNC: 142 MMOL/L (ref 136–145)
WBC # BLD: 6.5 K/UL (ref 4–11)

## 2021-02-18 PROCEDURE — 6370000000 HC RX 637 (ALT 250 FOR IP): Performed by: INTERNAL MEDICINE

## 2021-02-18 PROCEDURE — 80048 BASIC METABOLIC PNL TOTAL CA: CPT

## 2021-02-18 PROCEDURE — 92611 MOTION FLUOROSCOPY/SWALLOW: CPT | Performed by: SPEECH-LANGUAGE PATHOLOGIST

## 2021-02-18 PROCEDURE — 2580000003 HC RX 258: Performed by: INTERNAL MEDICINE

## 2021-02-18 PROCEDURE — 1200000000 HC SEMI PRIVATE

## 2021-02-18 PROCEDURE — 92526 ORAL FUNCTION THERAPY: CPT | Performed by: SPEECH-LANGUAGE PATHOLOGIST

## 2021-02-18 PROCEDURE — 74230 X-RAY XM SWLNG FUNCJ C+: CPT

## 2021-02-18 PROCEDURE — 99222 1ST HOSP IP/OBS MODERATE 55: CPT | Performed by: INTERNAL MEDICINE

## 2021-02-18 PROCEDURE — 71045 X-RAY EXAM CHEST 1 VIEW: CPT

## 2021-02-18 PROCEDURE — 2700000000 HC OXYGEN THERAPY PER DAY

## 2021-02-18 PROCEDURE — 85025 COMPLETE CBC W/AUTO DIFF WBC: CPT

## 2021-02-18 PROCEDURE — 6360000002 HC RX W HCPCS: Performed by: INTERNAL MEDICINE

## 2021-02-18 PROCEDURE — 94761 N-INVAS EAR/PLS OXIMETRY MLT: CPT

## 2021-02-18 PROCEDURE — 36415 COLL VENOUS BLD VENIPUNCTURE: CPT

## 2021-02-18 RX ADMIN — AMPICILLIN SODIUM AND SULBACTAM SODIUM 1500 MG: 1; .5 INJECTION, POWDER, FOR SOLUTION INTRAMUSCULAR; INTRAVENOUS at 06:30

## 2021-02-18 RX ADMIN — GABAPENTIN 400 MG: 400 CAPSULE ORAL at 20:41

## 2021-02-18 RX ADMIN — HEPARIN SODIUM 5000 UNITS: 5000 INJECTION INTRAVENOUS; SUBCUTANEOUS at 05:47

## 2021-02-18 RX ADMIN — AMPICILLIN SODIUM AND SULBACTAM SODIUM 1500 MG: 1; .5 INJECTION, POWDER, FOR SOLUTION INTRAMUSCULAR; INTRAVENOUS at 00:30

## 2021-02-18 RX ADMIN — TAMSULOSIN HYDROCHLORIDE 0.8 MG: 0.4 CAPSULE ORAL at 08:24

## 2021-02-18 RX ADMIN — ACETAMINOPHEN 650 MG: 325 TABLET ORAL at 20:41

## 2021-02-18 RX ADMIN — CASTOR OIL AND BALSAM, PERU: 788; 87 OINTMENT TOPICAL at 08:24

## 2021-02-18 RX ADMIN — DOCUSATE SODIUM 100 MG: 100 CAPSULE, LIQUID FILLED ORAL at 20:41

## 2021-02-18 RX ADMIN — GABAPENTIN 400 MG: 400 CAPSULE ORAL at 08:24

## 2021-02-18 RX ADMIN — AMPICILLIN SODIUM AND SULBACTAM SODIUM 1500 MG: 1; .5 INJECTION, POWDER, FOR SOLUTION INTRAMUSCULAR; INTRAVENOUS at 15:20

## 2021-02-18 RX ADMIN — ACETAMINOPHEN 650 MG: 325 TABLET ORAL at 15:29

## 2021-02-18 RX ADMIN — ZOLPIDEM TARTRATE 10 MG: 5 TABLET ORAL at 23:55

## 2021-02-18 RX ADMIN — ATORVASTATIN CALCIUM 80 MG: 80 TABLET, FILM COATED ORAL at 20:41

## 2021-02-18 RX ADMIN — DOCUSATE SODIUM 100 MG: 100 CAPSULE, LIQUID FILLED ORAL at 08:24

## 2021-02-18 RX ADMIN — HEPARIN SODIUM 5000 UNITS: 5000 INJECTION INTRAVENOUS; SUBCUTANEOUS at 15:21

## 2021-02-18 RX ADMIN — PANTOPRAZOLE SODIUM 40 MG: 40 TABLET, DELAYED RELEASE ORAL at 05:47

## 2021-02-18 RX ADMIN — CASTOR OIL AND BALSAM, PERU: 788; 87 OINTMENT TOPICAL at 23:55

## 2021-02-18 RX ADMIN — AMPICILLIN SODIUM AND SULBACTAM SODIUM 1500 MG: 1; .5 INJECTION, POWDER, FOR SOLUTION INTRAMUSCULAR; INTRAVENOUS at 22:34

## 2021-02-18 RX ADMIN — Medication 10 ML: at 08:24

## 2021-02-18 RX ADMIN — ASPIRIN 81 MG: 81 TABLET, CHEWABLE ORAL at 08:25

## 2021-02-18 RX ADMIN — HEPARIN SODIUM 5000 UNITS: 5000 INJECTION INTRAVENOUS; SUBCUTANEOUS at 23:55

## 2021-02-18 ASSESSMENT — PAIN - FUNCTIONAL ASSESSMENT: PAIN_FUNCTIONAL_ASSESSMENT: ACTIVITIES ARE NOT PREVENTED

## 2021-02-18 ASSESSMENT — PAIN DESCRIPTION - FREQUENCY
FREQUENCY: INTERMITTENT
FREQUENCY: CONTINUOUS
FREQUENCY: CONTINUOUS

## 2021-02-18 ASSESSMENT — PAIN DESCRIPTION - DESCRIPTORS
DESCRIPTORS: DISCOMFORT
DESCRIPTORS: ACHING

## 2021-02-18 ASSESSMENT — PAIN DESCRIPTION - PAIN TYPE
TYPE: ACUTE PAIN

## 2021-02-18 ASSESSMENT — PAIN SCALES - GENERAL
PAINLEVEL_OUTOF10: 0
PAINLEVEL_OUTOF10: 0
PAINLEVEL_OUTOF10: 8
PAINLEVEL_OUTOF10: 8

## 2021-02-18 ASSESSMENT — PAIN DESCRIPTION - ONSET
ONSET: GRADUAL
ONSET: ON-GOING

## 2021-02-18 ASSESSMENT — PAIN DESCRIPTION - LOCATION
LOCATION: BACK;LEG
LOCATION: BUTTOCKS

## 2021-02-18 NOTE — PROGRESS NOTES
NUTRITION ASSESSMENT  Admission Date: 2/14/2021     Type and Reason for Visit: Reassess    NUTRITION RECOMMENDATIONS:   1. PO diet: Continue with Dental soft per SLP. 2. Please provide assistance in meal set up. 3. ONS: Continue with standard/ High calorie BID    NUTRITION ASSESSMENT:  Nutritional status improving but remains at compromise. Pt has MBS today and has been followed per SLP. Pt indicating good appetite and po intake pf meals and ONS. Noted on assessment that he needs ++ assistance with meal set up. RD recommendation above. MALNUTRITION ASSESSMENT  Context of Malnutrition: Acute Illness   Malnutrition Status: At risk for malnutrition (Comment)  Findings of the 6 clinical characteristics of malnutrition (Minimum of 2 out of 6 clinical characteristics is required to make the diagnosis of moderate or severe Protein Calorie Malnutrition based on AND/ASPEN Guidelines):  Energy Intake: Less than/equal to 75% of estimated energy requirements    Energy Intake Time: x4 days   Weight Loss %: No significant loss   Weight loss Time: Greater than or equal to 6 months   Fluid Accumulation: Moderate (2+)   Fluid Accumulation Location: Extremities    Strength: Not Performed; Not Measured     NUTRITION DIAGNOSIS  Problem: Problem #1: Predicted inadequate energy intake  Etiology: Decreased ability to consume sufficient energy   Signs & Symptoms: Impaired ADLs and Swallow Study Results    NUTRITION INTERVENTION  Food and/or Nutrient Delivery:Continue Current diet  or Continue Current ONS   Nutrition education/counseling/coordination of care: Continue Inpatient Monitoring     NUTRITION MONITORING & EVALUATION:  Evaluation:Progressing towards goal   Goals:Goals: Pt will consume >50% of meals and ONS this admission  Monitoring: Meal Intake  or Supplement Intake      OBJECTIVE DATA:  · Nutrition-Focused Physical Findings: +2 BLE edema. Limited dexterity noted.    · Wounds Buttocks Right;Left IAD/MASD  excoriation Past Medical History:   Diagnosis Date    BPH (benign prostatic hyperplasia)     Carotid stenosis 12/2013    JESU 61-01% stenosis; LICA 44-74% stenosis    Cellulitis 12/2013    LLE    Chronic back pain     Diverticular disease     Erectile dysfunction     GERD (gastroesophageal reflux disease)     History of atrial fibrillation     Hypercholesteremia     Hypertension     Lower GI bleed     MDRO (multiple drug resistant organisms) resistance 10/26/2019    urine    Neuromuscular disorder (HCC)     spasticity    Renal insufficiency     Risk for falls     uses walker    Tinea corporis 12/2013    LLE    Vitamin B12 deficiency         ANTHROPOMETRICS  Current Height: 5' 11\" (180.3 cm)  Current Weight: 218 lb 4.1 oz (99 kg)    Admission weight: 224 lb (101.6 kg)  Ideal Bodyweight 172 lb     Weight Changes: none noted        BMI BMI (Calculated): 30.5    Wt Readings from Last 50 Encounters:   02/15/21 218 lb 4.1 oz (99 kg)   12/17/20 198 lb (89.8 kg)   09/21/20 198 lb (89.8 kg)   08/27/20 186 lb (84.4 kg)   08/19/20 189 lb 9.5 oz (86 kg)   06/29/20 202 lb (91.6 kg)   06/11/20 213 lb 10 oz (96.9 kg)   05/04/20 218 lb 14.7 oz (99.3 kg)   03/20/20 207 lb (93.9 kg)   02/10/20 207 lb 3.2 oz (94 kg)   01/20/20 177 lb 4 oz (80.4 kg)     COMPARATIVE STANDARDS  Estimated Total Kcals/Day : 22-25  Current Bodyweight (99 kg) 9649-6833 kcal    Estimated Total Protein (g/day) : 1.3-1.5 Ideal Bodyweight  (78 kg) 101-117 g/day  Estimated Daily Total Fluid (ml/day): 1 mL/kcal per day     Food / Nutrition-Related History  Pre-Admission / Home Diet:  Pre-Admission/Home Diet: General   Home Supplements / Herbals:    none noted  Food Restrictions / Cultural Requests:    none noted    Current Nutrition Therapies   DIET DENTAL SOFT; Dental Soft  Dietary Nutrition Supplements: Standard High Calorie Oral Supplement     PO Intake: 51-75%  PO Supplement: Standard High Calorie    PO Supplement Intake: %  IVF:none NUTRITION RISK LEVEL: Risk Level:  Moderate     Malini Samaniego LD  [place]:  [phone]  Office:  [phone]

## 2021-02-18 NOTE — PLAN OF CARE
Problem: Falls - Risk of:  Goal: Will remain free from falls  Description: Will remain free from falls  2/18/2021 0152 by Frank Geronimo RN  Outcome: Ongoing  Note: Patient remains free from physical injury. Fall precautions in place: Patient in bed lowest position,wheels locked. 2/4 side rails up Call light and beside table within reach. Will continue to monitor       Problem: Skin Integrity:  Goal: Absence of new skin breakdown  Description: Absence of new skin breakdown  Outcome: Ongoing  Note: Skin assessment done this shift. No new sign of skin breakdown noted. Patient turned and repositioned Q2h .  Patient on speciality mattress Will continue to monitor

## 2021-02-18 NOTE — PROGRESS NOTES
Hospitalist Progress Note      PCP: INES BLANDON [address]    Date of Admission: 2/14/2021    Chief Complaint:     Chief Complaint   Patient presents with    Other     pt ripped out catheter and is bleeding from penis; home health nurse sent; efrain also states that pt is not taking care of himself at home; poor living conditions        Subjective:  Patient seen and examined at the bedside.   Urine enterococcus, on D4 vancomycin of total 5d  Still on 2L, now on D2 of Unasyn for aspiration pneumonia  Pulmonary consulted for persistent O2 requirement    PFHS: reviewed as documented 2/14/2021, no changes    Medications:  Reviewed    Infusion Medications   Scheduled Medications    ampicillin-sulbactam  1,500 mg Intravenous Q6H    tamsulosin  0.8 mg Oral Daily    pantoprazole  40 mg Oral QAM AC    docusate sodium  100 mg Oral BID    atorvastatin  80 mg Oral Nightly    aspirin  81 mg Oral Daily    sodium chloride flush  10 mL Intravenous 2 times per day    heparin (porcine)  5,000 Units Subcutaneous 3 times per day    Venelex   Topical BID    gabapentin  400 mg Oral BID     PRN Meds: zolpidem, melatonin, sodium chloride flush, ondansetron, polyethylene glycol, acetaminophen **OR** acetaminophen      Intake/Output Summary (Last 24 hours) at 2/18/2021 0941  Last data filed at 2/18/2021 0913  Gross per 24 hour   Intake 850 ml   Output 2225 ml   Net -1375 ml       Physical Exam    BP (!) 169/70   Pulse 63   Temp 97.6 °F (36.4 °C) (Oral)   Resp 18   Ht 5' 11\" (1.803 m)   Wt 218 lb 4.1 oz (99 kg)   SpO2 98%   BMI 30.44 kg/m²     General appearance:  No acute distress, appears stated age  Eyes: Pupils equal, round, reactive to light, conjunctiva/corneas clear  Ears/Nose/Mouth/Throat: No external lesions or scars, hearing intact to voice  Neck: Trachea midline, no masses noted, no thyromegaly  Respiratory:  Non-labored breathing, clear to auscultation bilaterally  Cardiovascular: Regular rate and rhythm, no N18.30] 02/15/2021    Urinary tract infection associated with indwelling urethral catheter ([facility] [T83.511A, N39.0] 02/15/2021    Acute cystitis with hematuria [N30.01] 02/14/2021    Hyperlipidemia [E78.5] 11/20/2019    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] 01/27/2014    CAD (coronary artery disease) [I25.10] 01/27/2014    Essential hypertension [I10] 12/16/2013       Plan:    # Acute cystitis  # Hematuria  -due to pulling out schneider  -urine culture growing enterococcus, sensitivities back  -initial vancomycin/cefepime, now only vancomycin, to complete 5d course     # Possible aspiration  -O2 requirement stable at 2L  -Unasyn  -Pulm consult     # CAD  -s/p CABG x5  -continue asa, statin     # ALIS on CKD 3  -hold lasix initially w/ gentle fluids  -Cr improving     # HTN  -not on home meds     # HLD  -continue statin    DVT Prophylaxis: Heparin  Diet: DIET DENTAL SOFT; Dental Soft  Dietary Nutrition Supplements: Standard High Calorie Oral Supplement  Code Status: Full Code    PT/OT Eval Status: Ongoing    Dispo: Inpt, dispo pending clinical improvement, O2 requirement, possibly 2/19    Yahaira France MD

## 2021-02-18 NOTE — PROGRESS NOTES
Patient A&O, VSS w/ exception to slightly elevated BP. Patient reports discomfort on bottom, PRN acetaminophen administered per orders, venelex applied per orders (check MAR), and patient repositioned for comfort. Iglesias cath in place draining yellow urine. When consuming lunch, patient started to have a coughing episode. Patient denied rest of food tray. Patient reminded to take small sips of liquids. BP (!) 155/87   Pulse 73   Temp 97.9 °F (36.6 °C) (Oral)   Resp 16   Ht 5' 11\" (1.803 m)   Wt 218 lb 4.1 oz (99 kg)   SpO2 92%   BMI 30.44 kg/m²     Patient denies any further needs at this time. Bed is in the lowest position, bed alarm on, patient call light and bedside table are within reach. Will continue to monitor for changes in patient status.     Electronically signed by Lacie Linder on 2/18/2021 at 4:32 PM

## 2021-02-18 NOTE — PROCEDURES
endorsed a \"rattling\" sensation in his throat which he characterized as vocal roughness; suspect may be d/t weakness and high levels of oxygen. Will continue to monitor. Patient Complaints/Reason for Referral:  Renata Rosales was referred for a MBS to assess the efficiency of his/her swallow function, assess for aspiration, and to make recommendations regarding safe dietary consistencies, effective compensatory strategies, and safe eating environment. Patient complaints: \"coughing\" during meals    Onset of problem:   Date of Onset: 2/12/21    General Comment  Comments: 79y.o. year-old male with a history of hypertension, hyperlipidemia, CAD s/p CABG x 5 and a chronic indwelling Iglesias catheter. He has a h/o complicated UTIs with multi-drug resistant organisms. Ongoing complications w/ suspected aspiration resulting in coughing events during meals. Subjective  Subjective: Per chart review, RN report, and pt preference, instrumental evaluation recommended to fully assess risk of aspiration and determine safest diet consistency. Behavior/Cognition/Vision/Hearing:  Behavior/Cognition: Alert; Cooperative  Vision: Within Functional Limits  Hearing: Exceptions to [facility]  Hearing Exceptions: Hard of hearing/hearing concerns    Impressions:  Treatment Dx and ICD 10: R13.12   Patient Position: Lateral and Patient Degrees: 90*    Consistencies Administered: Reg solid; Dysphagia Pureed (Dysphagia I); Nectar  teaspoon; Thin cup; Thin straw; Thin teaspoon    Compensatory Swallowing Strategies Attempted: Alternate solids and liquids;Upright as possible for all oral intake;Effortful swallow;Small bites/sips  Postural Changes and/or Swallow Maneuvers Trialed: Upright 30 min after meal    Oral Phase: Mild oral deficits characterized by weak lingual manipulation resulting in premature spillage to level of valleculae w/ all consistencies; mildly delayed swallow initiation and intermittent cues required to swallow.  Piecemeal swallow noted x2 w/ thin liquids; statis of valleculae and cleared w/ secondary swallow. Pharyngeal: Mild pharyngeal phase characterized by vallecular residue that required secondary swallow to clear. No penetration or aspiration w/ any consistencies or trials. No pharyngeal residue. Upper Esophageal Screen: Adequate clearance of UES and proximal esophagus. Pt does endorsed acid reflux; further f/u w/ PCP or GI. Dysphagia Outcome Severity Scale: Level 5: Mild dysphagia- Distant supervision. May need one diet consistency restricted  Penetration-Aspiration Scale (PAS): 1 - Material does not enter the airway    Recommended Diet:  Solid consistency: Dental Soft  Liquid consistency:  Thin  Liquid administration via: Cup;Straw    Medication administration: PO    Safe Swallow Protocol:  Supervision: Distant  Compensatory Swallowing Strategies: Upright as possible for all oral intake;Remain upright for 30-45 minutes after meals;Small bites/sips;Swallow 2 times per bite/sip        Recommendations/Treatment  Requires SLP Intervention: Yes     Recommendations comment: Given pt reported acid reflux and coughing during/post meals, further evaluation of esophageal deficits warranted  D/C Recommendations: 24 hour supervision/assistance  Postural Changes and/or Swallow Maneuvers: Upright      Recommended Exercises:    Therapeutic Interventions: Diet tolerance monitoring;Effortful swallow    Referral To: GI    Education: Images and recommendations were reviewed with pt and RN following this exam.   Patient Education: Educated pt to reasoning behind MBS, observations, diet recommendation, and additional etiologies related to coughing during meals  Patient Education Response: Verbalizes understanding;Needs reinforcement    Prognosis  Prognosis for safe diet advancement: good  Barriers to reach goals: age;fatigue  Duration/Frequency of Treatment  Duration/Frequency of Treatment: 1-3x      Goals:    1- The patient will tolerate recommended diet without observed clinical signs of aspiration  2/16: Pt observed w/ breakfast tray this date consisting of soft solid + thin liquids. Pt positioned upright in bed and agreeable to meal; stating at 96 O2 consistency w/ 5L NC. Pt requires assist feeds d/t UE weakness. Consumed all consistencies w/o overt s/s of aspiration; no impulsivity w/ thin liquids and noted to take small, single sips. No throat clear or change in vocal quality. Noted to have anterior spillage x1 w/ soft solid; independently cleared and endorsed \"distraction\" d/t conversation. Cont goal  2/17: Per chart review, pt with possible aspiration incident yesterday. Discussed with RN, who states pt has been tolerating PO intake well this date. Received pt awake, alert, pleasant, on 2L O2 via nc. Pt denies difficulty swallowing, stating he has not been having any difficulty swallowing yesterday or today, states he has been doing well with the soft solid diet. Analyzed tolerance thins via cup and puree solid (pt declined other PO options), with positive oral acceptance, effective oral prep, positive swallow movement, no overt signs of aspiration or penetration. Given concern for possible aspiration incident yesterday, recommend follow-up once more. Cont goal.  2/18: Pt upright in bed consuming breakfast tray upon arrival; resting comfortably on NC 2L. Analyzed w/ soft solid, puree, and thin liquids; no overt s/s of aspiration. Per chart review, pt w/ suspected aspiration episode two days prior; pt endorsed ongoing sensation of coughing/choking during meals, but does feel it is improving. Pt independently reported concern regarding aspiration given ongoing sensation of phlegm. Given ongoing reported episodes by medical staff and pt concern, recommend MBS to full assess swallow physiology and safest diet consistency. Cont goal     2- The patient/caregiver will demonstrate understanding of compensatory strategies for improved swallowing safety.   2/16: Pt w/ independent recall of diet recommendation and use of small sips; endorsed seeing it on the whiteboard and using it to assist during meals. Noted to have small, single sips w/o additional cues required. Pt expressed understanding of overt s/s of aspiration, and endorsed no difficulties since breakfast yesterday. Cont goal  2/17: Reviewed dysphagia, swallow function, aspiration concern, and general safe swallow strategies (upright position during PO intake, small sips, slow rate). Pt stated comprehension. Cont goal.  2/18: Reviewed overt s/s of aspiration, impact on respiration, aspiration precautions, and recommendation for MBS. Pt expressed understanding to all the above and would like to proceed w/ instrumental evaluation. Cont goal  2/18 (2): Educated pt to results of MBS, including no evidence of penetration/aspiration. Encouraged to continue compensations of small sips/bites, and utilize sequential swallow. Educated to additional etiologies that could be causing cough, including acid reflux or ongoing pulmonary deficits. Pt expressed understanding but reinforcement required. Cont goal    Oral Preparation / Oral Phase  Oral Phase: Impaired  Oral Phase - Major Contributing Deficits  Weak Lingual Manipulation: All  Premature Bolus Loss to Pharynx: All      Pharyngeal Phase  Pharyngeal Phase: Impaired  Pharyngeal Phase - Major Contributing Deficits  Premature Spillage to Valleculae:  All    Esophageal Phase  Esophageal Screen: WFL      Pain   Patient Currently in Pain: Denies  Pain Level: 0      Therapy Time:   Individual Concurrent Group Co-treatment   Time In 1115         Time Out 1145         Minutes 30            Timed Code Treatment Minutes: 0 Minutes  Total Treatment Time: 27    Thank you,    Deborah Aaron) [place], [place], [address] [facility]; [phone]  Speech-Language Pathologist

## 2021-02-18 NOTE — PROGRESS NOTES
Patient alert and oriented. Patient spikes a fever 101.3 . Tylenol given., Belkys Holt MD notified. Patient denies any pain Iglesias in place draining yellow urine. Patient in bed lowest position call light and bedside table within reach. All needs are met at this time. Patient aware to call if any help needed. Will continue to monitor  /83   Pulse 84   Temp 101.3 °F (38.5 °C) (Oral)   Resp 18   Ht 5' 11\" (1.803 m)   Wt 218 lb 4.1 oz (99 kg)   SpO2 92%   BMI 30.44 kg/m²

## 2021-02-18 NOTE — PROGRESS NOTES
Speech Language Pathology  Facility/Department: [facility] [place] [address] SOUTH SURGERY  Dysphagia Daily Treatment Note    NAME: Rick Jean  : 1940  MRN: [id number]    Patient Diagnosis(es):   Patient Active Problem List    Diagnosis Date Noted    Acute renal failure superimposed on stage 3 chronic kidney disease ([address] 02/15/2021    Urinary tract infection associated with indwelling urethral catheter ([address] 02/15/2021    Acute cystitis with hematuria 2021    Abnormal angiogram 2020    H/O angiography 2020    Abnormal stress test 2020    Constipation 2020    Encopresis with constipation and overflow incontinence 2020    Cellulitis of scrotum 2020    Acute renal injury ([address] 2020    Pseudomonas infection 2020    Dyspnea     Bradycardia     CHF with unknown LVEF ([address] 2020    Gross hematuria 2019    Chronic indwelling Iglesias catheter 2019    Hyperlipidemia 2019    Bilateral lower leg cellulitis 2019    Neurogenic bladder 2019    Bacteriuria 2019    Abnormality of urethral meatus 2019    Acute encephalopathy 10/08/2019    Problem with urinary catheter ([address] 10/07/2019    Edema     Complicated UTI (urinary tract infection) 2019    Hypothermia 2019    Acute low back pain without sciatica 2017    Hip fracture, right ([address] 2015    Acute right hip pain     Lower GI bleeding 11/10/2014    CAD (coronary artery disease) 2014    S/P CABG x 5 2014    Cellulitis 2013    Essential hypertension 2013    GERD (gastroesophageal reflux disease) 2013    Acute blood loss anemia 2013    Tinea corporis 2013    Carotid stenosis 2013    Hypotension 2013    Light headed 2013     Allergies:    Allergies   Allergen Reactions    Bactrim [Sulfamethoxazole-Trimethoprim] Rash       CXR (2/15/21)-  Impression     1. Slight progression of airspace disease in the right mid and upper lung zone. 2. Improved left mid airspace disease with decreased size of a small left pleural effusion. Previous MBS - NA    Chart reviewed. Medical Diagnosis: UTI  Treatment Diagnosis: Dysphagia    BSE Impression (2/15/21)-  Oropharyngeal swallow appears grossly WFLs at this time; pt noted to have intermittent throat clear regardless of PO, and endorsed onset after aspiration event during breakfast. Suspect irritation and increased secretions for protection. No observed difficulties during evaluation; no overt s/s of aspiration w/ any trials. Completed 3oz water test w/o difficulties. Pt endorsed consuming softer foods at home, so recommend dental soft + thin liquids at this time w/ close monitoring of respiratory status and s/s of aspiration. Pt endorsed a \"rattling\" sensation in his throat which he characterized as vocal roughness; suspect may be d/t weakness and high levels of oxygen. Will continue to monitor. MBS results - NA  Ongoing monitoring for instrumental warranted    Pain: Denied    Current Diet : Dental soft + thin liquids; small, single sips    Treatment:  Pt seen bedside to address the following goals:  1- The patient will tolerate recommended diet without observed clinical signs of aspiration  2/16: Pt observed w/ breakfast tray this date consisting of soft solid + thin liquids. Pt positioned upright in bed and agreeable to meal; stating at 96 O2 consistency w/ 5L NC. Pt requires assist feeds d/t UE weakness. Consumed all consistencies w/o overt s/s of aspiration; no impulsivity w/ thin liquids and noted to take small, single sips. No throat clear or change in vocal quality. Noted to have anterior spillage x1 w/ soft solid; independently cleared and endorsed \"distraction\" d/t conversation. Cont goal  2/17: Per chart review, pt with possible aspiration incident yesterday.  Discussed with RN, who states pt has been tolerating PO intake well this date. Received pt awake, alert, pleasant, on 2L O2 via nc. Pt denies difficulty swallowing, stating he has not been having any difficulty swallowing yesterday or today, states he has been doing well with the soft solid diet. Analyzed tolerance thins via cup and puree solid (pt declined other PO options), with positive oral acceptance, effective oral prep, positive swallow movement, no overt signs of aspiration or penetration. Given concern for possible aspiration incident yesterday, recommend follow-up once more. Cont goal.  2/18: Pt upright in bed consuming breakfast tray upon arrival; resting comfortably on NC 2L. Analyzed w/ soft solid, puree, and thin liquids; no overt s/s of aspiration. Per chart review, pt w/ suspected aspiration episode two days prior; pt endorsed ongoing sensation of coughing/choking during meals, but does feel it is improving. Pt independently reported concern regarding aspiration given ongoing sensation of phlegm. Given ongoing reported episodes by medical staff and pt concern, recommend MBS to full assess swallow physiology and safest diet consistency. Cont goal    2- The patient/caregiver will demonstrate understanding of compensatory strategies for improved swallowing safety. 2/16: Pt w/ independent recall of diet recommendation and use of small sips; endorsed seeing it on the whiteboard and using it to assist during meals. Noted to have small, single sips w/o additional cues required. Pt expressed understanding of overt s/s of aspiration, and endorsed no difficulties since breakfast yesterday. Cont goal  2/17: Reviewed dysphagia, swallow function, aspiration concern, and general safe swallow strategies (upright position during PO intake, small sips, slow rate). Pt stated comprehension. Cont goal.  2/18: Reviewed overt s/s of aspiration, impact on respiration, aspiration precautions, and recommendation for MBS.  Pt expressed understanding to all the above and would like to proceed w/ instrumental evaluation. Cont goal    Patient/Family/Caregiver Education:  See the above. Compensatory Strategies:  Upright during all meals  Oral care  Small sips       Plan:  Continued daily Dysphagia treatment with goals per plan of care. MBS to be completed this date. Diet recommendations: Dental soft + thin liquids  DC recommendation: TBD  Treatment: 10  D/W nursing, Nyeli  Needs met prior to leaving room, call button in reach. Thank you,    Julio Cesar Amos) [place], [place], Moises Segovia; [id number]  Speech-Language Pathologist  Pg.  # [id number]    If patient is discharged prior to next treatment, this note will serve as the discharge summary

## 2021-02-18 NOTE — PROGRESS NOTES
Patient with episode of coughing after liquids, spitting up with it. Messaged Dr Erma Marie, new order for chest xray.

## 2021-02-18 NOTE — PLAN OF CARE
Nutrition Problem #1: Predicted inadequate energy intake  Intervention: Food and/or Nutrient Delivery: Continue Current Diet, Continue Oral Nutrition Supplement  Nutritional Goals: Pt will consume >50% of meals and ONS this admission

## 2021-02-18 NOTE — CARE COORDINATION
CM following, precert expires today, will need updated PT OT  And restart precert 0/60 so pt can DC. PT cont to be on 2L O2, cont IV ABX. chest Xray ordered .        Electronically signed by Sydney Munoz RN on 2/18/2021 at 2:09 PM  [phone]

## 2021-02-18 NOTE — CONSULTS
Initial Pulmonary & Critical Care Consult Note      Reason for Consult: Hypoxemia  Requesting Physician: Dr. Annamarie Martinez:   [address] [facility]:                The patient is a 79 y.o. male with significant past medical history of hypertension, hyperlipidemia, CAD and a chronic indwelling Iglesias catheter was admitted to the hospital on February 15 for acute cystitis with hematuria associated with an indwelling ureteral catheter. He has no chronic lung disease and on admission did not require oxygen but was requiring 10 L by the next afternoon after an episode of aspiration and vomiting. Since then his oxygen requirements have been gradually weaned to his current 1 L. He had MBS today and has been started on a diet with thin liquids. He states he still has some cough with phlegm production and some mild dyspnea but he looks comfortable    Past Medical History:      Diagnosis Date    BPH (benign prostatic hyperplasia)     Carotid stenosis 12/2013    JESU 58-51% stenosis; LICA 05-66% stenosis    Cellulitis 12/2013    LLE    Chronic back pain     Diverticular disease     Erectile dysfunction     GERD (gastroesophageal reflux disease)     History of atrial fibrillation     Hypercholesteremia     Hypertension     Lower GI bleed     MDRO (multiple drug resistant organisms) resistance 10/26/2019    urine    Neuromuscular disorder (HCC)     spasticity    Renal insufficiency     Risk for falls     uses walker    Tinea corporis 12/2013    LLE    Vitamin B12 deficiency       Past Surgical History:        Procedure Laterality Date    BACK SURGERY  2006    lower lumbar    CORONARY ARTERY BYPASS GRAFT  12/13/2013    CABG x 5 (Dr Sami Graham), svg to diag, om1 and 3, distal rca, kelly to lad.      CYSTOSCOPY N/A 1/10/2019    CYSTOSCOPY performed by Yaima Chen MD at [facility] [address] Right 5/1/2015    [facility]    SIGMOIDOSCOPY N/A 6/11/2020    SIGMOIDOSCOPY DIAGNOSTIC FLEXIBLE performed by Pawel Harrison MD at [facility] N/A 5/4/2020    EGD BIOPSY performed by Pawel Harrison MD at [facility] END[facility]COPY     Current Medications:     ampicillin-sulbactam  1,500 mg Intravenous Q6H    tamsulosin  0.8 mg Oral Daily    pantoprazole  40 mg Oral QAM AC    docusate sodium  100 mg Oral BID    atorvastatin  80 mg Oral Nightly    aspirin  81 mg Oral Daily    sodium chloride flush  10 mL Intravenous 2 times per day    heparin (porcine)  5,000 Units Subcutaneous 3 times per day    Venelex   Topical BID    gabapentin  400 mg Oral BID        Social History: Former smoker -quit 1969    Family History:   CAD    REVIEW OF SYSTEMS:    CONSTITUTIONAL:  negative for fevers, chills, diaphoresis, activity change, appetite change, fatigue, night sweats and unexpected weight change.    EYES:  negative for blurred vision, eye discharge, visual disturbance and icterus  HEENT:  negative for hearing loss, tinnitus, ear drainage, sinus pressure, nasal congestion, epistaxis and snoring  RESPIRATORY:  See HPI  CARDIOVASCULAR:  negative for chest pain, palpitations, exertional chest pressure/discomfort, edema, syncope  GASTROINTESTINAL:  negative for nausea, vomiting, diarrhea, constipation, blood in stool and abdominal pain  GENITOURINARY:  negative for frequency, dysuria, urinary incontinence, decreased urine volume, and hematuria  HEMATOLOGIC/LYMPHATIC:  negative for easy bruising, bleeding and lymphadenopathy  ALLERGIC/IMMUNOLOGIC:  negative for recurrent infections, angioedema, anaphylaxis and drug reactions  ENDOCRINE:  negative for weight changes and diabetic symptoms including polyuria, polydipsia and polyphagia  MUSCULOSKELETAL:  negative for  pain in all extremities, joint swelling, decreased range of motion in all extremities and muscle weakness  NEUROLOGICAL:  negative for headaches, slurred speech, unilateral weakness  PSYCHIATRIC/BEHAVIORAL: negative for hallucinations, behavioral problems, confusion and agitation. Objective:   PHYSICAL EXAM:      VITALS:  /67   Pulse 62   Temp 97.6 °F (36.4 °C) (Oral)   Resp 16   Ht 5' 11\" (1.803 m)   Wt 218 lb 4.1 oz (99 kg)   SpO2 92%   BMI 30.44 kg/m²   24HR INTAKE/OUTPUT:      Intake/Output Summary (Last 24 hours) at 2021 1342  Last data filed at 2021 1222  Gross per 24 hour   Intake 880 ml   Output 2350 ml   Net -1470 ml     CURRENT PULSE OXIMETRY:  SpO2: 92 %  24HR PULSE OXIMETRY RANGE:  SpO2  Av.1 %  Min: 92 %  Max: 98 %    CONSTITUTIONAL:  awake, alert, cooperative, no apparent distress, and appears stated age  EYES: Pupils equal round and reactive to light. Normal conjunctiva  EARS, NOSE, MOUTH & THROAT: Normal oropharynx. Ears and nose appear normal  NECK:  Supple, symmetrical, trachea midline, no adenopathy, thyroid symmetric, not enlarged and no tenderness, skin normal  LUNGS:  no increased work of breathing and clear to auscultation. No accessory muscle use  CARDIOVASCULAR:  normal S1 and S2, no edema and no JVD  ABDOMEN:  normal bowel sounds, non-distended and no masses palpated, and no tenderness to palpation. No hepatospleenomegaly  LYMPHADENOPATHY:  no axillary or supraclavicular adenopathy. No cervical adnenopathy  PSYCHIATRIC: Oriented to person place and time. No obvious depression or anxiety. MUSCULOSKELETAL: No obvious misalignment or effusion of the joints. No clubbing, cyanosis of the digits. SKIN:  normal skin color, texture, turgor and no redness, warmth, or swelling.  No palpable nodules    DATA:    Old records have been reviewed  CBC with Differential:    Lab Results   Component Value Date    WBC 6.5 2021    RBC 2.90 2021    HGB 8.9 2021    HCT 27.0 2021     2021    MCV 93.4 2021    MCH 30.6 2021    MCHC 32.7 2021    RDW 16.0 2021    BANDSPCT 2 2019    LYMPHOPCT 21.7 2021    MONOPCT 13.2 02/18/2021    MYELOPCT 1 01/03/2019    BASOPCT 0.6 02/18/2021    MONOSABS 0.9 02/18/2021    LYMPHSABS 1.4 02/18/2021    EOSABS 0.3 02/18/2021    BASOSABS 0.0 02/18/2021     BMP:    Lab Results   Component Value Date     02/18/2021    K 4.6 02/18/2021     02/18/2021    CO2 25 02/18/2021    BUN 47 02/18/2021    CREATININE 1.7 02/18/2021    CALCIUM 8.2 02/18/2021    GFRAA 47 02/18/2021    LABGLOM 39 02/18/2021    GLUCOSE 94 02/18/2021     Hepatic Function Panel:    Lab Results   Component Value Date    ALKPHOS 282 12/13/2020    ALT 17 12/13/2020    AST 22 12/13/2020    PROT 8.9 12/13/2020    BILITOT 0.4 12/13/2020    BILIDIR <0.2 12/13/2020    IBILI see below 12/13/2020     ABG:    Lab Results   Component Value Date    TAJ1YNE 24 02/07/2020    BEART -1.0 02/07/2020    V5QYILBM 98 02/07/2020    PHART 7.352 02/07/2020    IMN2UWP 44.4 02/07/2020    PO2ART 95.3 02/07/2020    SCH1WSS 25 02/07/2020       Cultures:   Blood Culture: No growth to date    Radiology Review:  All pertinent images / reports were reviewed as a part of this visit. Chest x-ray reveals the following:  FINDINGS:       HEART / MEDIASTINUM: Significant chest rotation       LUNGS/PLEURA: No or gross change involving left basilar airspace disease with pleural effusion       BONES / SOFT TISSUES: No acute abnormality.       OTHER: None.           Impression       Significantly rotated chest with no gross change involving left basilar airspace disease with pleural effusion       Assessment       1. Hypoxemia requiring oxygen  2. Aspiration pneumonia  3. Small chronic left lower lobe loculated effusion    Plan     1. Continue Unasyn  2. Wean oxygen as tolerated for caution saturation 88%  3.   Monitor closely for signs or symptoms of aspiration          Maia Sims

## 2021-02-19 VITALS
SYSTOLIC BLOOD PRESSURE: 154 MMHG | WEIGHT: 218.26 LBS | TEMPERATURE: 97.4 F | BODY MASS INDEX: 30.56 KG/M2 | HEIGHT: 71 IN | RESPIRATION RATE: 18 BRPM | DIASTOLIC BLOOD PRESSURE: 69 MMHG | HEART RATE: 84 BPM | OXYGEN SATURATION: 95 %

## 2021-02-19 LAB
ANION GAP SERPL CALCULATED.3IONS-SCNC: 10 MMOL/L (ref 3–16)
ANISOCYTOSIS: ABNORMAL
BASOPHILS ABSOLUTE: 0.1 K/UL (ref 0–0.2)
BASOPHILS RELATIVE PERCENT: 1 %
BLOOD CULTURE, ROUTINE: NORMAL
BUN BLDV-MCNC: 43 MG/DL (ref 7–20)
CALCIUM SERPL-MCNC: 8.7 MG/DL (ref 8.3–10.6)
CHLORIDE BLD-SCNC: 107 MMOL/L (ref 99–110)
CO2: 23 MMOL/L (ref 21–32)
CREAT SERPL-MCNC: 1.4 MG/DL (ref 0.8–1.3)
EOSINOPHILS ABSOLUTE: 1.1 K/UL (ref 0–0.6)
EOSINOPHILS RELATIVE PERCENT: 11 %
GFR AFRICAN AMERICAN: 59
GFR NON-AFRICAN AMERICAN: 49
GLUCOSE BLD-MCNC: 109 MG/DL (ref 70–99)
HCT VFR BLD CALC: 31.9 % (ref 40.5–52.5)
HEMOGLOBIN: 10.2 G/DL (ref 13.5–17.5)
LYMPHOCYTES ABSOLUTE: 2.2 K/UL (ref 1–5.1)
LYMPHOCYTES RELATIVE PERCENT: 21 %
MCH RBC QN AUTO: 30.3 PG (ref 26–34)
MCHC RBC AUTO-ENTMCNC: 32.1 G/DL (ref 31–36)
MCV RBC AUTO: 94.4 FL (ref 80–100)
MONOCYTES ABSOLUTE: 0.3 K/UL (ref 0–1.3)
MONOCYTES RELATIVE PERCENT: 3 %
NEUTROPHILS ABSOLUTE: 6.6 K/UL (ref 1.7–7.7)
NEUTROPHILS RELATIVE PERCENT: 64 %
PDW BLD-RTO: 16.3 % (ref 12.4–15.4)
PLATELET # BLD: ABNORMAL K/UL (ref 135–450)
PLATELET SLIDE REVIEW: ABNORMAL
PMV BLD AUTO: ABNORMAL FL (ref 5–10.5)
POTASSIUM REFLEX MAGNESIUM: 5.5 MMOL/L (ref 3.5–5.1)
RBC # BLD: 3.37 M/UL (ref 4.2–5.9)
SARS-COV-2, NAAT: NOT DETECTED
SODIUM BLD-SCNC: 140 MMOL/L (ref 136–145)
WBC # BLD: 10.3 K/UL (ref 4–11)

## 2021-02-19 PROCEDURE — 6370000000 HC RX 637 (ALT 250 FOR IP): Performed by: INTERNAL MEDICINE

## 2021-02-19 PROCEDURE — 97530 THERAPEUTIC ACTIVITIES: CPT

## 2021-02-19 PROCEDURE — 87635 SARS-COV-2 COVID-19 AMP PRB: CPT

## 2021-02-19 PROCEDURE — 6360000002 HC RX W HCPCS: Performed by: INTERNAL MEDICINE

## 2021-02-19 PROCEDURE — 2580000003 HC RX 258: Performed by: INTERNAL MEDICINE

## 2021-02-19 PROCEDURE — 36415 COLL VENOUS BLD VENIPUNCTURE: CPT

## 2021-02-19 PROCEDURE — 85025 COMPLETE CBC W/AUTO DIFF WBC: CPT

## 2021-02-19 PROCEDURE — 99232 SBSQ HOSP IP/OBS MODERATE 35: CPT | Performed by: INTERNAL MEDICINE

## 2021-02-19 PROCEDURE — 80048 BASIC METABOLIC PNL TOTAL CA: CPT

## 2021-02-19 PROCEDURE — 92526 ORAL FUNCTION THERAPY: CPT

## 2021-02-19 RX ORDER — GABAPENTIN 300 MG/1
600 CAPSULE ORAL 2 TIMES DAILY
Status: DISCONTINUED | OUTPATIENT
Start: 2021-02-19 | End: 2021-02-19 | Stop reason: HOSPADM

## 2021-02-19 RX ORDER — AMOXICILLIN AND CLAVULANATE POTASSIUM 875; 125 MG/1; MG/1
1 TABLET, FILM COATED ORAL 2 TIMES DAILY
Qty: 14 TABLET | Refills: 0 | Status: SHIPPED | OUTPATIENT
Start: 2021-02-19 | End: 2021-02-24

## 2021-02-19 RX ADMIN — DOCUSATE SODIUM 100 MG: 100 CAPSULE, LIQUID FILLED ORAL at 09:15

## 2021-02-19 RX ADMIN — TAMSULOSIN HYDROCHLORIDE 0.8 MG: 0.4 CAPSULE ORAL at 09:15

## 2021-02-19 RX ADMIN — HEPARIN SODIUM 5000 UNITS: 5000 INJECTION INTRAVENOUS; SUBCUTANEOUS at 09:15

## 2021-02-19 RX ADMIN — AMPICILLIN SODIUM AND SULBACTAM SODIUM 1500 MG: 1; .5 INJECTION, POWDER, FOR SOLUTION INTRAMUSCULAR; INTRAVENOUS at 09:15

## 2021-02-19 RX ADMIN — ASPIRIN 81 MG: 81 TABLET, CHEWABLE ORAL at 09:15

## 2021-02-19 RX ADMIN — GABAPENTIN 400 MG: 400 CAPSULE ORAL at 09:15

## 2021-02-19 RX ADMIN — AMPICILLIN SODIUM AND SULBACTAM SODIUM 1500 MG: 1; .5 INJECTION, POWDER, FOR SOLUTION INTRAMUSCULAR; INTRAVENOUS at 03:56

## 2021-02-19 RX ADMIN — PANTOPRAZOLE SODIUM 40 MG: 40 TABLET, DELAYED RELEASE ORAL at 07:45

## 2021-02-19 ASSESSMENT — ENCOUNTER SYMPTOMS
DIARRHEA: 0
SINUS PRESSURE: 0
CONSTIPATION: 0
SINUS PAIN: 0
WHEEZING: 0
COLOR CHANGE: 0
CHEST TIGHTNESS: 0
SORE THROAT: 0
SHORTNESS OF BREATH: 0
VOMITING: 0
COUGH: 0
NAUSEA: 0
ABDOMINAL DISTENTION: 0
ABDOMINAL PAIN: 0
RHINORRHEA: 0

## 2021-02-19 ASSESSMENT — VISUAL ACUITY: OU: 1

## 2021-02-19 NOTE — PROGRESS NOTES
Progress Note    Admit Date: 2/14/2021  Day:5    CC: Hypoxemia    Interval history: No acute events overnight. This AM, pt reports improved cough, strength and alertness. He denies SOB. O2 has successfully been weaned off and he's now on RA sating 94%. MS yesterday showed No penetration or aspiration. He denies any other sx and states he slept well. Medications:     Scheduled Meds:   ampicillin-sulbactam  1,500 mg Intravenous Q6H    tamsulosin  0.8 mg Oral Daily    pantoprazole  40 mg Oral QAM AC    docusate sodium  100 mg Oral BID    atorvastatin  80 mg Oral Nightly    aspirin  81 mg Oral Daily    sodium chloride flush  10 mL Intravenous 2 times per day    heparin (porcine)  5,000 Units Subcutaneous 3 times per day    Venelex   Topical BID    gabapentin  400 mg Oral BID     Continuous Infusions:  PRN Meds:zolpidem, melatonin, sodium chloride flush, ondansetron, polyethylene glycol, acetaminophen **OR** acetaminophen    Objective:   Vitals:   T-max:  Patient Vitals for the past 8 hrs:   BP Temp Temp src Pulse Resp SpO2   02/19/21 0911 137/72 97.7 °F (36.5 °C) Oral 86 18 94 %   02/19/21 0256 126/72 97.9 °F (36.6 °C) Oral 72 16 93 %       Intake/Output Summary (Last 24 hours) at 2/19/2021 0933  Last data filed at 2/19/2021 0256  Gross per 24 hour   Intake 370 ml   Output 1700 ml   Net -1330 ml       Review of Systems   Constitutional: Negative for chills, fatigue and fever. HENT: Negative for congestion, rhinorrhea, sinus pressure, sinus pain and sore throat. Eyes: Negative for visual disturbance. Respiratory: Negative for cough, chest tightness, shortness of breath and wheezing. Cardiovascular: Negative for chest pain, palpitations and leg swelling. Gastrointestinal: Negative for abdominal distention, abdominal pain, constipation, diarrhea, nausea and vomiting. Musculoskeletal: Negative for arthralgias and myalgias. Skin: Negative for color change.    Neurological: Negative for dizziness, syncope, weakness, light-headedness and headaches. Psychiatric/Behavioral: Negative for agitation and confusion. The patient is not nervous/anxious. Physical Exam  Constitutional:       General: He is not in acute distress. Appearance: Normal appearance. He is not ill-appearing. HENT:      Head: Normocephalic and atraumatic. Eyes:      General: Vision grossly intact. Conjunctiva/sclera: Conjunctivae normal.   Neck:      Musculoskeletal: Full passive range of motion without pain, normal range of motion and neck supple. Cardiovascular:      Rate and Rhythm: Normal rate and regular rhythm. Pulses: Normal pulses. Heart sounds: Normal heart sounds, S1 normal and S2 normal. No murmur. No friction rub. No gallop. Pulmonary:      Effort: Pulmonary effort is normal. No respiratory distress. Breath sounds: Normal air entry. Rales (Fine crackles in b/l lung bases L>R) present. No wheezing. Comments: On RA  Abdominal:      General: Bowel sounds are normal. There is no distension. Palpations: Abdomen is soft. Tenderness: There is no abdominal tenderness. Musculoskeletal: Normal range of motion. Right lower leg: No edema. Left lower leg: No edema. Lymphadenopathy:      Cervical: No cervical adenopathy. Skin:     Capillary Refill: Capillary refill takes less than 2 seconds. Coloration: Skin is not pale. Neurological:      Mental Status: He is alert and oriented to person, place, and time. Mental status is at baseline.    Psychiatric:         Mood and Affect: Mood normal.         Speech: Speech normal.         Judgment: Judgment normal.         LABS:    CBC:   Recent Labs     02/17/21  0522 02/18/21  0531 02/19/21  0530   WBC 6.1 6.5 10.3   HGB 8.6* 8.9* 10.2*   HCT 25.9* 27.0* 31.9*   * 110* see below   MCV 93.4 93.4 94.4     Renal:    Recent Labs     02/17/21  0522 02/18/21  0531 02/19/21  0530    142 140   K 4.8 4.6 5.5*    109 107 CO2 25 25 23   BUN 48* 47* 43*   CREATININE 1.9* 1.7* 1.4*   GLUCOSE 101* 94 109*   CALCIUM 8.4 8.2* 8.7   ANIONGAP 8 8 10     Hepatic: No results for input(s): AST, ALT, BILITOT, BILIDIR, PROT, LABALBU, ALKPHOS in the last 72 hours. Troponin: No results for input(s): TROPONINI in the last 72 hours. BNP: No results for input(s): BNP in the last 72 hours. Lipids: No results for input(s): CHOL, HDL in the last 72 hours. Invalid input(s): LDLCALCU, TRIGLYCERIDE  ABGs:  No results for input(s): PHART, FUB0LRQ, PO2ART, PDK2OWU, BEART, THGBART, F5ZLHKOY, GFJ9WYR in the last 72 hours. INR: No results for input(s): INR in the last 72 hours. Lactate: No results for input(s): LACTATE in the last 72 hours. Cultures:  -----------------------------------------------------------------  RAD:   XR CHEST PORTABLE   Final Result      Significantly rotated chest with no gross change involving left basilar airspace disease with pleural effusion            FL MODIFIED BARIUM SWALLOW W VIDEO   Final Result      No penetration or aspiration      Please see speech pathology assessment for dietary recommendations. XR CHEST PORTABLE   Final Result      1. Slight progression of airspace disease in the right mid and upper lung zone. 2. Improved left mid airspace disease with decreased size of a small left pleural effusion. CT ABDOMEN PELVIS WO CONTRAST Additional Contrast? None   Final Result      1. Subcutaneous edema in the penis and scrotum which is nonspecific but may indicate cellulitis. No subcutaneous emphysema. 2. Cholelithiasis without evidence of acute cholecystitis. 3. Hypodense lesions in the right kidney are unchanged from the recent prior studies. 4. Left nephrolithiasis without hydronephrosis. 5. Moderate distention of the rectum. 6. Moderate hiatal hernia. Assessment/Plan:   Shagufta Humphries is a 79 y.o. male, who is being seen in consultation for hypoxemia.     Aspiration Pneumonia  Likely the cause of pts hypoxemia, which is now resolved. Pt on RA sating appropriately. - Hypoxemia resolved. Okay to discharge form pulm standpoint.   - Continue Abx. Can discharge home on Augmenting BID to complete 5-7days of Abx therapy      This patient has been staffed and discussed with Shila Tafoya MD.  ----------------------------------  Mariia Guillen MD, PGY-2  02/19/21  9:33 AM    Pulmonary    Patient seen and examined. I agree with Dr. Brice Adkins history, physical, lab findings, assessment and plan.     Shila Tafoya MD

## 2021-02-19 NOTE — CARE COORDINATION
CM following, pt now on RA was able to Wean O2 off. Spoke with Teri alfred clinical sent and precert extended to 2/93. [place] need transport at [place] and [facility].   Electronically signed by Navid Mendez RN on 2/19/2021 at 11:38 AM  [phone]

## 2021-02-19 NOTE — PROGRESS NOTES
Pt has been A&Ox4, VSS, lungs diminished in bases but able to wean off O2 this morning, pt tolerating room air well. Pt has been repositioned while in bed, pt was able to stay in chair after therapy saw pt for about an hour and a half. Pt was informed of impending discharge, IV removed, per CM daughter informed as well. Pt was readied for discharge and left at around [phone] with transport, report called to [facility].

## 2021-02-19 NOTE — CARE COORDINATION
Case Management Assessment            Discharge Note                    Date / Time of Note: 2/19/2021 12:07 PM                  Discharge Note Completed by: Jacqui Renteria    Patient Name: Miguel Angel Granda   YOB: 1940  Diagnosis: Acute cystitis with hematuria [N30.01]   Date / Time: 2/14/2021  6:46 PM    Current PCP: Tim Matt patient: No    Hospitalization in the last 30 days: No    Advance Directives:  Code Status: Full Code  [place] DNR form completed and on chart: No    Financial:  Payor: Adela Bills / Plan: Oniel Pian PLUS HMO / Product Type: *No Product type* /      Pharmacy:    [facility] [phone] - F [phone]  [address] [facility] [address]  Phone: [phone] Fax: [phone]    [id number], [address] [facility] [phone] - F [phone]  [place] [id number]  Phone: [phone] Fax: [phone]      Assistance purchasing medications?: Potential Assistance Purchasing Medications: No  Assistance provided by Case Management: None at this time    Does patient want to participate in local refill/ meds to beds program?: Not Assessed    Meds To Beds General Rules:  1. Can ONLY be done Monday- Friday between 8:30am-5pm  2. Prescription(s) must be in pharmacy by 3pm to be filled same day  3. Copy of patient's insurance/ prescription drug card and patient face sheet must be sent along with the prescription(s)  4. Cost of Rx cannot be added to hospital bill. If financial assistance is needed, please contact unit  or ;  or  CANNOT provide pharmacy voucher for patients co-pays  5.  Patients can then  the prescription on their way out of the hospital at discharge, or pharmacy can deliver to the bedside if staff is available. (payment due at time of pick-up or delivery - cash, check, or patient's individualized plan of care/goals and shares the quality data associated with the providers.  Yes    Care Transitions patient: No    Pham Vargas RN  [facility]  Case Management Department  Ph: [phone]  Fax: [phone]

## 2021-02-19 NOTE — PROGRESS NOTES
Hospitalist Progress Note      PCP: INES BLANDON [address]    Date of Admission: 2/14/2021    Chief Complaint:     Chief Complaint   Patient presents with    Other     pt ripped out catheter and is bleeding from penis; home health nurse sent; efrain also states that pt is not taking care of himself at home; poor living conditions        Subjective:  Patient seen and examined at the bedside. No complaints at this time.   On room air    PFHS: reviewed as documented 2/14/2021, no changes    Medications:  Reviewed    Infusion Medications   Scheduled Medications    gabapentin  600 mg Oral BID    ampicillin-sulbactam  1,500 mg Intravenous Q6H    tamsulosin  0.8 mg Oral Daily    pantoprazole  40 mg Oral QAM AC    docusate sodium  100 mg Oral BID    atorvastatin  80 mg Oral Nightly    aspirin  81 mg Oral Daily    sodium chloride flush  10 mL Intravenous 2 times per day    heparin (porcine)  5,000 Units Subcutaneous 3 times per day    Venelex   Topical BID     PRN Meds: zolpidem, melatonin, sodium chloride flush, ondansetron, polyethylene glycol, acetaminophen **OR** acetaminophen      Intake/Output Summary (Last 24 hours) at 2/19/2021 1159  Last data filed at 2/19/2021 0256  Gross per 24 hour   Intake 370 ml   Output 1700 ml   Net -1330 ml       Physical Exam    /60   Pulse 75   Temp 97.9 °F (36.6 °C) (Oral)   Resp 18   Ht 5' 11\" (1.803 m)   Wt 218 lb 4.1 oz (99 kg)   SpO2 94%   BMI 30.44 kg/m²     General appearance:  No acute distress, appears stated age  Eyes: Pupils equal, round, reactive to light, conjunctiva/corneas clear  Ears/Nose/Mouth/Throat: No external lesions or scars, hearing intact to voice  Neck: Trachea midline, no masses noted, no thyromegaly  Respiratory:  Non-labored breathing, clear to auscultation bilaterally  Cardiovascular: Regular rate and rhythm, no murmurs, gallops, or rubs  Abdomen: soft, non-tender, non-distended  Musculoskeletal: Warm, well perfused, no cyanosis or edema  Skin: normal color, no wounds noted  Psychiatric: A&Ox4, good insight and judgment    Labs:   Recent Labs     02/17/21  0522 02/18/21  0531 02/19/21  0530   WBC 6.1 6.5 10.3   HGB 8.6* 8.9* 10.2*   HCT 25.9* 27.0* 31.9*   * 110* see below     Recent Labs     02/17/21  0522 02/18/21  0531 02/19/21  0530    142 140   K 4.8 4.6 5.5*    109 107   CO2 25 25 23   BUN 48* 47* 43*   CREATININE 1.9* 1.7* 1.4*   CALCIUM 8.4 8.2* 8.7     No results for input(s): AST, ALT, BILIDIR, BILITOT, ALKPHOS in the last 72 hours. No results for input(s): INR in the last 72 hours. No results for input(s): Neldon Docker in the last 72 hours. Urinalysis:      Lab Results   Component Value Date    NITRU POSITIVE 02/14/2021    WBCUA 21-50 02/14/2021    BACTERIA 4+ 02/14/2021    RBCUA  02/14/2021    BLOODU LARGE 02/14/2021    SPECGRAV 1.020 02/14/2021    GLUCOSEU Negative 02/14/2021       Radiology:  XR CHEST PORTABLE   Final Result      Significantly rotated chest with no gross change involving left basilar airspace disease with pleural effusion            FL MODIFIED BARIUM SWALLOW W VIDEO   Final Result      No penetration or aspiration      Please see speech pathology assessment for dietary recommendations. XR CHEST PORTABLE   Final Result      1. Slight progression of airspace disease in the right mid and upper lung zone. 2. Improved left mid airspace disease with decreased size of a small left pleural effusion. CT ABDOMEN PELVIS WO CONTRAST Additional Contrast? None   Final Result      1. Subcutaneous edema in the penis and scrotum which is nonspecific but may indicate cellulitis. No subcutaneous emphysema. 2. Cholelithiasis without evidence of acute cholecystitis. 3. Hypodense lesions in the right kidney are unchanged from the recent prior studies. 4. Left nephrolithiasis without hydronephrosis. 5. Moderate distention of the rectum. 6. Moderate hiatal hernia. Assessment/Plan:    Active Hospital Problems    Diagnosis Date Noted    Acute renal failure superimposed on stage 3 chronic kidney disease ([facility] [address] [N17.9, N18.30] 02/15/2021    Urinary tract infection associated with indwelling urethral catheter [facility] [N61.799Z, N39.0] 02/15/2021    Acute cystitis with hematuria [N30.01] 02/14/2021    Hyperlipidemia [E78.5] 11/20/2019    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] 01/27/2014    CAD (coronary artery disease) [I25.10] 01/27/2014    Essential hypertension [I10] 12/16/2013       Plan:    # Acute cystitis  # Hematuria  -due to pulling out schneider  -urine culture growing enterococcus, sensitivities back  -initial vancomycin/cefepime, now only vancomycin, to complete 5d course     # Possible aspiration  -Initial O2 requirement, now on room air  -Unasyn in-house, Augmentin to complete 7d course  -Pulm consult     # CAD  -s/p CABG x5  -continue asa, statin     # ALIS on CKD 3  -hold lasix initially w/ gentle fluids  -Cr improving     # HTN  -not on home meds     # HLD  -continue statin    DVT Prophylaxis: Heparin  Diet: DIET DENTAL SOFT; Dental Soft  Dietary Nutrition Supplements: Standard High Calorie Oral Supplement  Code Status: Full Code    PT/OT Eval Status: Complete    Dispo: medically ready for discharge when post-acute care arranged    Jacy Torrez MD

## 2021-02-19 NOTE — PROGRESS NOTES
Speech Language Pathology  Facility/Department: [facility] [place] [address] [address]  Dysphagia Daily Treatment Note    NAME: Shagufta Humphries  : 1940  MRN: [id number]    Patient Diagnosis(es):   Patient Active Problem List    Diagnosis Date Noted    Acute renal failure superimposed on stage 3 chronic kidney disease ([address] 02/15/2021    Urinary tract infection associated with indwelling urethral catheter ([address] 02/15/2021    Acute cystitis with hematuria 2021    Abnormal angiogram 2020    H/O angiography 2020    Abnormal stress test 2020    Constipation 2020    Encopresis with constipation and overflow incontinence 2020    Cellulitis of scrotum 2020    Acute renal injury ([address] 2020    Pseudomonas infection 2020    Dyspnea     Bradycardia     CHF with unknown LVEF ([address] 2020    Gross hematuria 2019    Chronic indwelling Iglesias catheter 2019    Hyperlipidemia 2019    Bilateral lower leg cellulitis 2019    Neurogenic bladder 2019    Bacteriuria 2019    Abnormality of urethral meatus 2019    Acute encephalopathy 10/08/2019    Problem with urinary catheter ([address] 10/07/2019    Edema     Complicated UTI (urinary tract infection) 2019    Hypothermia 2019    Acute low back pain without sciatica 2017    Hip fracture, right ([address] 2015    Acute right hip pain     Lower GI bleeding 11/10/2014    CAD (coronary artery disease) 2014    S/P CABG x 5 2014    Cellulitis 2013    Essential hypertension 2013    GERD (gastroesophageal reflux disease) 2013    Acute blood loss anemia 2013    Tinea corporis 2013    Carotid stenosis 2013    Hypotension 2013    Light headed 2013     Allergies:    Allergies   Allergen Reactions    Bactrim [Sulfamethoxazole-Trimethoprim] Rash       CXR (2/15/21)-  Impression     1. Slight progression of airspace disease in the right mid and upper lung zone. 2. Improved left mid airspace disease with decreased size of a small left pleural effusion. Previous MBS - NA  Chart reviewed. Medical Diagnosis: UTI  Treatment Diagnosis: Dysphagia    BSE Impression (2/15/21)-  Oropharyngeal swallow appears grossly WFLs at this time; pt noted to have intermittent throat clear regardless of PO, and endorsed onset after aspiration event during breakfast. Suspect irritation and increased secretions for protection. No observed difficulties during evaluation; no overt s/s of aspiration w/ any trials. Completed 3oz water test w/o difficulties. Pt endorsed consuming softer foods at home, so recommend dental soft + thin liquids at this time w/ close monitoring of respiratory status and s/s of aspiration. Pt endorsed a \"rattling\" sensation in his throat which he characterized as vocal roughness; suspect may be d/t weakness and high levels of oxygen. Will continue to monitor. MBS results 2/18/21  Oral Phase: Mild oral deficits characterized by weak lingual manipulation resulting in premature spillage to level of valleculae w/ all consistencies; mildly delayed swallow initiation and intermittent cues required to swallow. Piecemeal swallow noted x2 w/ thin liquids; statis of valleculae and cleared w/ secondary swallow. Pharyngeal: Mild pharyngeal phase characterized by vallecular residue that required secondary swallow to clear. No   penetration or aspiration w/ any consistencies or trials. No pharyngeal residue.   Upper Esophageal Screen: Adequate clearance of UES and proximal esophagus.  Pt does endorsed acid reflux; further f/u w/ PCP or GI.     Pain: Denied    Current Diet : Dental soft + thin liquids; small, single sips    Treatment:  Pt seen bedside to address the following goals:  1- The patient will tolerate recommended diet without observed clinical signs of aspiration  2/16: Pt observed w/ cheese, peas and liquids. No overt signs of aspiration emerged, voice remained clear. No difficulty with mastication of solid texture. Pt looked to whiteboard and stated he is suppose to take small bites/sips. No respiratory decline per chart review. Pt did endorse feeling of reflux when lying down, unsure of time. Pt states that he sat more upright when that happened  Goal met    2- The patient/caregiver will demonstrate understanding of compensatory strategies for improved swallowing safety. 2/16: Pt w/ independent recall of diet recommendation and use of small sips; endorsed seeing it on the whiteboard and using it to assist during meals. Noted to have small, single sips w/o additional cues required. Pt expressed understanding of overt s/s of aspiration, and endorsed no difficulties since breakfast yesterday. Cont goal  2/17: Reviewed dysphagia, swallow function, aspiration concern, and general safe swallow strategies (upright position during PO intake, small sips, slow rate). Pt stated comprehension. Cont goal.  2/18: Reviewed overt s/s of aspiration, impact on respiration, aspiration precautions, and recommendation for MBS. Pt expressed understanding to all the above and would like to proceed w/ instrumental evaluation. Cont goal  2/18 (2): Educated pt to results of MBS, including no evidence of penetration/aspiration. Encouraged to continue compensations of small sips/bites, and utilize sequential swallow. Educated to additional etiologies that could be causing cough, including acid reflux or ongoing pulmonary deficits. Pt expressed understanding but reinforcement required. Cont goal  2/19: pt educated again to results of MBS and recommendations. As above, pt looked to whiteboard for instructions to take small bites/sips. Also explained use of sequential swallow and alternating consistencies. Pt stated understanding, but will benefit from reinforcement.     Cont goal    Patient/Family/Caregiver Education:  As above. Compensatory Strategies:  Upright as possible for all oral intake  Remain upright for 30-45 minutes after meals  Small bites/sips  Swallow 2 times per bite/sip     Plan:    Diet recommendations: Dental soft + thin liquids  DC recommendation: most likely will not require follow up  Treatment: 14  D/W nursingJustyna prior to session  Needs met prior to leaving room, call button in reach. Angeles Pierre M.S./[facility] #[id number]  Pg.  # [phone]  If patient is discharged prior to next treatment, this note will serve as the discharge summary

## 2021-02-19 NOTE — PROGRESS NOTES
Occupational Therapy  Facility/Department: [facility]  Daily Treatment Note  NAME: Shagufta Humphries  : 1940  MRN: [id number]    Date of Service: 2021    Discharge Recommendations:  Shagufta Humphries scored a 13/24 on the AM-PAC ADL Inpatient form. Current research shows that an AM-PAC score of 17 or less is typically not associated with a discharge to the patient's home setting. Based on the patient's AM-PAC score and their current ADL deficits, it is recommended that the patient have 3-5 sessions per week of Occupational Therapy at d/c to increase the patient's independence. Please see assessment section for further patient specific details. If patient discharges prior to next session this note will serve as a discharge summary. Please see below for the latest assessment towards goals. OT Equipment Recommendations  Other: defer to SNF    Assessment   Performance deficits / Impairments: Decreased functional mobility ; Decreased ADL status; Decreased endurance;Decreased cognition;Decreased safe awareness  Assessment: Pt demo improving transfers- able to utilize oral stedy for transfer to chair with assist of 2. Pt demo gross weakness LE>UE. Requires assist of 2 for ADL transfers and standing ADLs. Pt demo improved cognition today with insight into his current abilities and need for assistance. PTA pt struggling at home and recently having difficulty transferring into w/c. Pt would benefit from OT tx to maximize his ADL and transfer abilities  Treatment Diagnosis: impaired ADLs /functional transfers / decreased orientation 2/2 cystitis  Prognosis: Fair  Activity Tolerance  Activity Tolerance: Patient limited by fatigue;Patient Tolerated treatment well  Safety Devices  Safety Devices in place: Yes  Type of devices: Call light within reach; Chair alarm in place;Nurse notified; Left in chair         Patient Diagnosis(es): The primary encounter diagnosis was Urinary tract infection with hematuria, assistance(from stedy to chair)  Transfer Comments: 2 attempts for sit pivot transfer bed>recliner- pt unable to complete with max A of 2. Then trialed oral lindo- pt able to partially  stedy for pericare and transfer to chair (Decreased ROM/stiffness in B LE, pt reports chronic spasticity and neuropathy).  Once in chair, completed partial stance with max A while assist provided to place waffle cushion in chair              Cognition  Overall Cognitive Status: [facility]              Plan   Plan  Times per week: 2-5x  Times per day: Daily  Current Treatment Recommendations: Functional Mobility Training, Endurance Training, Safety Education & Training, Self-Care / ADL, Equipment Evaluation, Education, & procurement, Patient/Caregiver Education & Training    AM-PAC Score        AM-PAC Inpatient Daily Activity Raw Score: 13 (02/19/21 0830)  AM-PAC Inpatient ADL T-Scale Score : 32.03 (02/19/21 0830)  ADL Inpatient CMS 0-100% Score: 63.03 (02/19/21 0830)  ADL Inpatient CMS G-Code Modifier : CL (02/19/21 0830)    Goals  Short term goals  Time Frame for Short term goals: at d/c  Short term goal 1: Sit at EOB x 7 mins with SBA for ADLs- progressing  Short term goal 2: Sit pivot transfer to chair / BSC with Mod A x 1- not met  Short term goal 3: Grooming with setup- not addressed  Short term goal 4: Oriented 3/4 attempts to environment- progressing  Patient Goals   Patient goals : Unable to state       Therapy Time   Individual Concurrent Group Co-treatment   Time In 0754         Time Out 0820         Minutes 26         Timed Code Treatment Minutes: [address], OT

## 2021-02-19 NOTE — PROGRESS NOTES
Physical Therapy  Facility/Department: [facility]  Daily Treatment Note  NAME: Tameka Anna  : 1940  MRN: [id number]    Date of Service: 2021    Discharge Recommendations:    Tameka Anna scored a 10/24 on the AM-PAC short mobility form. Current research shows that an AM-PAC score of 17 or less is typically not associated with a discharge to the patient's home setting. Based on the patient's AM-PAC score and their current functional mobility deficits, it is recommended that the patient have 3-5 sessions per week of Physical Therapy at d/c to increase the patient's independence. Please see assessment section for further patient specific details. PT Equipment Recommendations  Equipment Needed: No(defer)    Assessment   Body structures, Functions, Activity limitations: Decreased functional mobility ; Decreased balance;Decreased endurance;Decreased strength  Assessment: Patient tolerated session well but shows limitations in mobility due to weakness and overall decreased activity tolerance. He showed improvements in bed mobility during today's session, completing with SBA and increased time as movement is effortful. Patient unable to perform sit pivot transfer but able to stand at [facility] stedy with mod assist x 2. Patient continues to function well below baseline level. Recommend skilled PT upon discharge. Will follow while in acute care setting to maximize independence with mobility. Treatment Diagnosis: mobility impairment due to cystitis  Patient Education: Pt educated on PT role, importance of OOB mobility, need to call for assist to get up and he verbalized understanding but will need reinforcement. REQUIRES PT FOLLOW UP: Yes  Activity Tolerance  Activity Tolerance: Patient limited by endurance; Patient limited by fatigue;Patient Tolerated treatment well     Patient Diagnosis(es): The primary encounter diagnosis was Urinary tract infection with hematuria, site unspecified. Diagnoses of ALIS (acute kidney injury) ([facility] [address] and Cellulitis of scrotum were also pertinent to this visit. has a past medical history of BPH (benign prostatic hyperplasia), Carotid stenosis, Cellulitis, Chronic back pain, Diverticular disease, Erectile dysfunction, GERD (gastroesophageal reflux disease), History of atrial fibrillation, Hypercholesteremia, Hypertension, Lower GI bleed, MDRO (multiple drug resistant organisms) resistance, Neuromuscular disorder ([facility] Renal insufficiency, Risk for falls, Tinea corporis, and Vitamin B12 deficiency. has a past surgical history that includes back surgery (2006); Foot surgery; Coronary artery bypass graft (12/13/2013); hip surgery (Right, 5/1/2015); Cystoscopy (N/A, 1/10/2019); Upper gastrointestinal endoscopy (N/A, 5/4/2020); and sigmoidoscopy (N/A, 6/11/2020). Restrictions  Position Activity Restriction  Other position/activity restrictions: up with assistance  Subjective   General  Chart Reviewed: Yes  Additional Pertinent Hx: 79y.o. year-old male with a history of hypertension, hyperlipidemia, CAD s/p CABG x 5 and a chronic indwelling Iglesias catheter presented to ED 2/14/21 for acute cystitis/hematuria/ARF on CKD 3. CT abd/pelvis positive cellulitis. Family / Caregiver Present: No  Subjective  Subjective: Patient supine in bed upon arrival, agreeable to PT treatment. Pain Screening  Patient Currently in Pain: No  Vital Signs  Patient Currently in Pain: No       Orientation  Orientation  Overall Orientation Status: Within Functional Limits  Cognition   Cognition  Overall Cognitive Status: WFL  Objective   Bed mobility  Supine to Sit: Stand by assistance(head of bed elevated, cues for sequencing, increased time to complete tasks as movement is effortful Simultaneous filing.  User may not have seen previous data.)  Scooting: Dependent/Total(total assist x 2 to scoot back in bedside chair)  Comment: patient sitting in bedside chair at end of session  Transfers  Sit to Stand: 2 Person Assistance;Dependent/Total(attempted pivot x 2 trials with max assist x 2 and patient unable to come to stand; mod assist x 2 for EOB to oral stedy, CGA  from oral stedy paddles)  Stand to sit: 2 Person Assistance;Dependent/Total(mod assist x 2 to bedside chair from oral stedy, poor eccentric control)  Bed to Chair: Dependent/Total(via oral stedy)  Comment: attempted to perform sit pivot to bedside chair x 2 trials but patient unable to perform with max assist x 2; oral stedy used for transfer  Ambulation  Ambulation?: No(patient non-ambulatory at baseline)     Balance  Sitting - Static: Fair;+(SBA to sit EOB)  Standing - Static: Fair;-(min assist x 1 to  oral stedy, cues for upright posture; static standing for 1 minute on first trial, 30 seconds on second trial)  Exercises  Comments: encouraged ankle pumps while sitting in bedside chair; patient verbalized understanding.                       OutComes Score                                                     AM-PAC Score  -PAC Inpatient Mobility Raw Score : 10 (02/19/21 0830)  AM-PAC Inpatient T-Scale Score : 32.29 (02/19/21 0830)  Mobility Inpatient CMS 0-100% Score: 76.75 (02/19/21 0830)  Mobility Inpatient CMS G-Code Modifier : CL (02/19/21 0830)          Goals  Short term goals  Time Frame for Short term goals: discharge  Short term goal 1: bed to chair min assist - ongoing 2/19  Short term goal 2: independent LE HEP x10 reps- ongoing 2/19  Patient Goals   Patient goals : return home    Plan    Plan  Times per week: 2-5  Current Treatment Recommendations: Strengthening, Transfer Training, Balance Training, Functional Mobility Training  Safety Devices  Type of devices: Nurse notified, Call light within reach, Chair alarm in place, Left in chair, Gait belt(LEs elevated)     Therapy Time   Individual Concurrent Group Co-treatment   Time In 0754         Time Out 0820         Minutes 26         Timed Code Treatment Minutes: 26 Minutes     If patient discharges prior to next session this note will serve as a discharge summary. Please see below for the latest assessment towards goals.    [facility] [address]

## 2021-02-19 NOTE — PROGRESS NOTES
Patient A&O  Patient reports discomfort on bottom, PRN acetaminophen administered per orders, venelex applied per orders (check MAR), and patient repositioned for comfort. Iglesias cath in place draining yellow urine. Call light within reach.  Will continue to monitor

## 2021-02-19 NOTE — DISCHARGE SUMMARY
Hospital Medicine Discharge Summary    Patient ID: Hoa Gallagher      Patient's PCP: Linda Parrish    Admit Date: 2/14/2021     Discharge Date: 2/19/2021    Admitting Physician: Wendy Quevedo MD     Discharge Physician: Yun Mccormick MD     Discharge Diagnoses: Active Hospital Problems    Diagnosis Date Noted    Acute renal failure superimposed on stage 3 chronic kidney disease ([facility] [N17.9, N18.30] 02/15/2021    Urinary tract infection associated with indwelling urethral catheter ([facility] [M99.465P, N39.0] 02/15/2021    Acute cystitis with hematuria [N30.01] 02/14/2021    Hyperlipidemia [E78.5] 11/20/2019    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    The patient was seen and examined on day of discharge and this discharge summary is in conjunction with any daily progress note from day of discharge. Hospital Course:    Please see admission H&P as well as progress note from this date. Physical Exam Performed:     /60   Pulse 75   Temp 97.9 °F (36.6 °C) (Oral)   Resp 18   Ht 5' 11\" (1.803 m)   Wt 218 lb 4.1 oz (99 kg)   SpO2 94%   BMI 30.44 kg/m²     Please see progress note from this date    Labs:  For convenience and continuity at follow-up the following most recent labs are provided:      CBC:    Lab Results   Component Value Date    WBC 10.3 02/19/2021    HGB 10.2 02/19/2021    HCT 31.9 02/19/2021    PLT see below 02/19/2021       Renal:    Lab Results   Component Value Date     02/19/2021    K 5.5 02/19/2021     02/19/2021    CO2 23 02/19/2021    BUN 43 02/19/2021    CREATININE 1.4 02/19/2021    CALCIUM 8.7 02/19/2021    PHOS 2.8 02/06/2020         Significant Diagnostic Studies    Radiology:   XR CHEST PORTABLE   Final Result      Significantly rotated chest with no gross change involving left 120 tablet, Refills: 2      docusate sodium (COLACE) 100 MG capsule Take 1 capsule by mouth 2 times daily  Qty: 30 capsule, Refills: 0      vitamin B-12 (CYANOCOBALAMIN) 1000 MCG tablet Take 1,000 mcg by mouth daily      aspirin 81 MG chewable tablet Take 1 tablet by mouth daily  Qty: 30 tablet, Refills: 3      pantoprazole (PROTONIX) 40 MG tablet Take 1 tablet by mouth every morning (before breakfast)  Qty: 30 tablet, Refills: 3      Balsam Peru-Castor Oil (VENELEX) OINT ointment Apply topically 2 times daily  Qty: 2 Tube, Refills: 0      tamsulosin (FLOMAX) 0.4 MG capsule Take 0.8 mg by mouth daily       Melatonin 10 MG TABS Take 10 mg by mouth nightly as needed       atorvastatin (LIPITOR) 80 MG tablet Take 80 mg by mouth nightly. zolpidem (AMBIEN) 10 MG tablet Take 10 mg by mouth nightly as needed for Sleep. Time Spent on discharge is 35 minutes in the examination, evaluation, counseling and review of medications and discharge plan. Signed:    Priti Cox MD   2/19/2021      Thank you INES Berger Asp for the opportunity to be involved in this patient's care. If you have any questions or concerns please feel free to contact me at [phone].

## 2021-02-19 NOTE — DISCHARGE INSTR - COC
Continuity of Care Form    Patient Name: Mata Covert   :  1940  MRN:  [id number]    Admit date:  2021  Discharge date:  2021    Code Status Order: Full Code   Advance Directives:   [facility] Documentation       Date/Time Healthcare Directive Type of Healthcare Directive Copy in [address] [facility] [address] Agent's Name Healthcare Agent's Phone Number    02/15/21 0106  Yes, patient has an advance directive for healthcare treatment  Durable power of  for health care;Living will  [address] [place] [address]            Admitting Physician:  Briseyda Sen MD  PCP: Tameka Peralta [id number]    Discharging Nurse: [facility] Unit/Room#: 5220/0489-41  Discharging Unit Phone Number: [phone]    Emergency Contact:   Extended Emergency Contact Information  Primary Emergency Contact: [place] [address] Phone: [phone]  Work Phone: [phone]  Mobile Phone: [phone]  Relation: Child  Secondary Emergency Contact: Dana Callahan  Address: [address]  Home Phone: [phone]  Relation: Other    Past Surgical History:  Past Surgical History:   Procedure Laterality Date    BACK SURGERY  2006    lower lumbar    CORONARY ARTERY BYPASS GRAFT  2013    CABG x 5 (Dr Susu Rainey), svg to diag, om1 and 3, distal rca, kelly to lad.      CYSTOSCOPY N/A 1/10/2019    CYSTOSCOPY performed by Anurag Richardson MD at [facility] Right 2015    ORIF    SIGMOIDOSCOPY N/A 2020    SIGMOIDOSCOPY DIAGNOSTIC FLEXIBLE performed by Matthew Torres MD at [address] [place] [address] N/A 2020    EGD BIOPSY performed by Matthew Torres MD at [facility] ENDOSCOPY       Immunization History:   Immunization History   Administered Date(s) Administered    Influenza A (O4D6-73) Vaccine PF IM 12/10/2009  Influenza Vaccine, unspecified formulation 12/13/2012, 11/06/2014, 10/13/2015, 09/01/2016, 11/03/2017, 10/26/2018, 09/20/2019    Influenza Virus Vaccine 12/19/2005, 12/14/2006, 12/13/2012, 11/30/2013, 10/13/2015, 09/20/2019    Influenza Whole 10/01/2007, 09/02/2010, 09/01/2011, 12/13/2012, 10/13/2015    Influenza, High Dose (Fluzone 65 yrs and older) 11/06/2014, 10/20/2020    PPD Test 09/16/1997    Pneumococcal Conjugate 13-valent (Ayidbxn07) 12/29/2014    Pneumococcal Conjugate 7-valent (Prevnar7) 12/29/2014    Pneumococcal Polysaccharide (Axgzzbkic43) 01/27/2014       Active Problems:  Patient Active Problem List   Diagnosis Code    Hypotension I95.9    Light headed R42    Cellulitis L03.90    Essential hypertension I10    GERD (gastroesophageal reflux disease) K21.9    Acute blood loss anemia D62    Tinea corporis B35.4    Carotid stenosis I65.29    CAD (coronary artery disease) I25.10    S/P CABG x 5 Z95.1    Lower GI bleeding K92.2    Hip fracture, right ([facility]) S72.001A    Acute right hip pain M25.551    Acute low back pain without sciatica M54.5    Hypothermia T68. Osmar Jonatan    Complicated UTI (urinary tract infection) N39.0    Edema R60.9    Problem with urinary catheter ([facility] [address] T83. 9XXA    Acute encephalopathy G93.40    Chronic indwelling Iglesias catheter Z97.8    Hyperlipidemia E78.5    Bilateral lower leg cellulitis L03.116, L03.115    Neurogenic bladder N31.9    Bacteriuria R82.71    Abnormality of urethral meatus Q64.70    Gross hematuria R31.0    CHF with unknown LVEF ([facility]) I50.9    Dyspnea R06.00    Bradycardia R00.1    Pseudomonas infection A49.8    Acute renal injury ([address] N17.9    Cellulitis of scrotum N49.2    Constipation K59.00    Encopresis with constipation and overflow incontinence R15.9    Abnormal stress test R94.39    H/O angiography Z92.89    Abnormal angiogram R93.89    Acute cystitis with hematuria N30.01  Acute renal failure superimposed on stage 3 chronic kidney disease (HCC) N17.9, N18.30    Urinary tract infection associated with indwelling urethral catheter ([facility] [address] T83.511A, N39.0       Isolation/Infection:   Isolation            Contact          Patient Infection Status       Infection Onset Added Last Indicated Last Indicated By Review Planned Expiration Resolved Resolved By    MDRO (multi-drug resistant organism) 05/02/20 05/04/20 05/02/20 Culture, Urine        Resolved    C-diff Rule Out 12/13/20 12/13/20 12/13/20 Clostridium difficile toxin/antigen (Ordered)   02/16/21 Trudi Ortiz RN    No stool ever sent.      COVID-19 Rule Out 06/10/20 06/10/20 06/10/20 COVID-19 (Ordered)   06/10/20 Rule-Out Test Resulted    C-diff Rule Out 06/09/20 06/09/20 06/09/20 GI Bacterial Pathogens By PCR (Ordered)   06/10/20 Rule-Out Test Resulted    C-diff Rule Out 06/09/20 06/09/20 06/09/20 Clostridium difficile toxin/antigen (Ordered)   06/09/20 Rule-Out Test Resulted    C-diff Rule Out 05/03/20 05/04/20 05/04/20 C. difficile toxin Molecular (Ordered)   05/05/20 Rule-Out Test Resulted    COVID-19 Rule Out 05/03/20 05/03/20 05/03/20 COVID-19 (Ordered)   05/03/20 Rule-Out Test Resulted    C-diff Rule Out 05/02/20 05/02/20 05/03/20 Clostridium difficile toxin/antigen (Ordered)   05/03/20 Rule-Out Test Resulted    MRSA 02/07/20 02/08/20 02/07/20 MRSA DNA Probe, Nasal   05/04/20 Ronny Amezcua RN    MDRO (multi-drug resistant organism) 10/26/19 10/28/19 11/19/19 Urine culture   01/16/20 Ronny Amezcua RN            Nurse Assessment:  Last Vital Signs: /60   Pulse 75   Temp 97.9 °F (36.6 °C) (Oral)   Resp 18   Ht 5' 11\" (1.803 m)   Wt 218 lb 4.1 oz (99 kg)   SpO2 94%   BMI 30.44 kg/m²     Last documented pain score (0-10 scale): Pain Level: 0  Last Weight:   Wt Readings from Last 1 Encounters:   02/15/21 218 lb 4.1 oz (99 kg)     Mental Status:  oriented and alert    IV Access:  - None Oxygen Therapy:  is not on home oxygen therapy. Ventilator:    - No ventilator support    Rehab Therapies: Physical Therapy, Occupational Therapy and Speech/Language Therapy  Weight Bearing Status/Restrictions: No weight bearing restirctions  Other Medical Equipment (for information only, NOT a DME order):  hospital bed and [employer]  Other Treatments: ***    Patient's personal belongings (please select all that are sent with patient):  {[facility] DME Belongings:[id number]:::0}    RN SIGNATURE:  Electronically signed by Catrina Alaniz RN on 2/19/21 at 2:53 PM EST    CASE MANAGEMENT/SOCIAL WORK SECTION    Inpatient Status Date: ***    Readmission Risk Assessment Score:  Readmission Risk              Risk of Unplanned Readmission:        19           Discharging to Facility/ [facility] [address] [place] [address]       Phone: [phone]       Fax: [phone]        ·     / signature: Electronically signed by Lety Locke RN on 2/19/21 at 1:55 PM EST    PHYSICIAN SECTION    Prognosis: Good    Condition at Discharge: Stable    Rehab Potential (if transferring to Rehab): Good    Recommended Labs or Other Treatments After Discharge:     SNF    Physician Certification: I certify the above information and transfer of Hoa Gallagher  is necessary for the continuing treatment of the diagnosis listed and that he requires [facility] for greater 30 days.      Update Admission H&P: No change in H&P    PHYSICIAN SIGNATURE:  Electronically signed by Yun Mccormick MD on 2/19/21 at 11:51 AM EST

## 2021-04-17 ENCOUNTER — APPOINTMENT (OUTPATIENT)
Dept: GENERAL RADIOLOGY | Age: 81
End: 2021-04-17
Payer: MEDICARE

## 2021-04-17 ENCOUNTER — HOSPITAL ENCOUNTER (EMERGENCY)
Age: 81
Discharge: HOME | End: 2021-04-17
Attending: EMERGENCY MEDICINE
Payer: MEDICARE

## 2021-04-17 VITALS
SYSTOLIC BLOOD PRESSURE: 153 MMHG | RESPIRATION RATE: 17 BRPM | OXYGEN SATURATION: 96 % | TEMPERATURE: 98.6 F | HEART RATE: 61 BPM | DIASTOLIC BLOOD PRESSURE: 62 MMHG

## 2021-04-17 DIAGNOSIS — T83.9XXA PROBLEM WITH FOLEY CATHETER, INITIAL ENCOUNTER (HCC): Primary | ICD-10-CM

## 2021-04-17 LAB
ANION GAP SERPL CALCULATED.3IONS-SCNC: 10 MMOL/L (ref 3–16)
BACTERIA: ABNORMAL /HPF
BASOPHILS ABSOLUTE: 0 K/UL (ref 0–0.2)
BASOPHILS RELATIVE PERCENT: 0.7 %
BILIRUBIN URINE: NEGATIVE
BLOOD, URINE: ABNORMAL
BUN BLDV-MCNC: 44 MG/DL (ref 7–20)
CALCIUM SERPL-MCNC: 8.8 MG/DL (ref 8.3–10.6)
CHLORIDE BLD-SCNC: 106 MMOL/L (ref 99–110)
CLARITY: CLEAR
CO2: 27 MMOL/L (ref 21–32)
COLOR: YELLOW
CREAT SERPL-MCNC: 1.9 MG/DL (ref 0.8–1.3)
EOSINOPHILS ABSOLUTE: 0.3 K/UL (ref 0–0.6)
EOSINOPHILS RELATIVE PERCENT: 6 %
GFR AFRICAN AMERICAN: 42
GFR NON-AFRICAN AMERICAN: 34
GLUCOSE BLD-MCNC: 95 MG/DL (ref 70–99)
GLUCOSE URINE: NEGATIVE MG/DL
HCT VFR BLD CALC: 32 % (ref 40.5–52.5)
HEMOGLOBIN: 10.3 G/DL (ref 13.5–17.5)
HYALINE CASTS: ABNORMAL /LPF (ref 0–2)
KETONES, URINE: NEGATIVE MG/DL
LEUKOCYTE ESTERASE, URINE: ABNORMAL
LYMPHOCYTES ABSOLUTE: 1 K/UL (ref 1–5.1)
LYMPHOCYTES RELATIVE PERCENT: 19.5 %
MCH RBC QN AUTO: 30.7 PG (ref 26–34)
MCHC RBC AUTO-ENTMCNC: 32.2 G/DL (ref 31–36)
MCV RBC AUTO: 95.4 FL (ref 80–100)
MICROSCOPIC EXAMINATION: YES
MONOCYTES ABSOLUTE: 0.3 K/UL (ref 0–1.3)
MONOCYTES RELATIVE PERCENT: 6.1 %
NEUTROPHILS ABSOLUTE: 3.3 K/UL (ref 1.7–7.7)
NEUTROPHILS RELATIVE PERCENT: 67.7 %
NITRITE, URINE: NEGATIVE
PDW BLD-RTO: 17.1 % (ref 12.4–15.4)
PH UA: 6 (ref 5–8)
PLATELET # BLD: 142 K/UL (ref 135–450)
PMV BLD AUTO: 9.4 FL (ref 5–10.5)
POTASSIUM REFLEX MAGNESIUM: 4.8 MMOL/L (ref 3.5–5.1)
PROTEIN UA: 30 MG/DL
RBC # BLD: 3.35 M/UL (ref 4.2–5.9)
RBC UA: ABNORMAL /HPF (ref 0–4)
SODIUM BLD-SCNC: 143 MMOL/L (ref 136–145)
SPECIFIC GRAVITY UA: 1.02 (ref 1–1.03)
URINE TYPE: ABNORMAL
UROBILINOGEN, URINE: 0.2 E.U./DL
WBC # BLD: 4.9 K/UL (ref 4–11)
WBC UA: ABNORMAL /HPF (ref 0–5)

## 2021-04-17 PROCEDURE — 99283 EMERGENCY DEPT VISIT LOW MDM: CPT

## 2021-04-17 PROCEDURE — 87086 URINE CULTURE/COLONY COUNT: CPT

## 2021-04-17 PROCEDURE — 80048 BASIC METABOLIC PNL TOTAL CA: CPT

## 2021-04-17 PROCEDURE — 51702 INSERT TEMP BLADDER CATH: CPT

## 2021-04-17 PROCEDURE — 36415 COLL VENOUS BLD VENIPUNCTURE: CPT

## 2021-04-17 PROCEDURE — 81001 URINALYSIS AUTO W/SCOPE: CPT

## 2021-04-17 PROCEDURE — 71045 X-RAY EXAM CHEST 1 VIEW: CPT

## 2021-04-17 PROCEDURE — 85025 COMPLETE CBC W/AUTO DIFF WBC: CPT

## 2021-04-17 NOTE — ED PROVIDER NOTES
ED Attending Attestation Note     Date of evaluation: 4/17/2021    This patient was seen by the advance practice provider, Vashti Gutiérrez PA-C. I have seen and examined the patient, agree with the workup, evaluation, management and diagnosis. The care plan has been discussed. This is a pleasant 80-year-old male with past medical history of hypertension, primary disease status post CABG, neurogenic bladder with chronic indwelling Iglesias catheter that presents today for evaluation of possible urinary tract infection. Patient states that his urinary catheter was last replaced on 3/17/2021. Her home health nurse did come to the house today and was concerned that there could be a possible infection, and sent him to the emergency department for further evaluation. The patient states he had some diarrhea but denies any occult fevers. Patient denies any abdominal pain. He denies any suprapubic pressure. Denies any flank tenderness. On exam, the patient is pleasant. He is in no acute distress. He has a regular rate, regular rhythm. Midline scar from prior open heart surgery. Abdomen soft, nontender, nondistended with normal active bowel sounds. No CVA tenderness. He does have uncircumcised penis with some mild irritation to the glans of the penis concerning for balanitis. There is an indwelling Iglesias catheter that is draining cloudy curly urine. Bilateral testes are distended. He has edema noted in the lower extremities bilaterally. Urethral Catheterization    Date/Time: 4/17/2021 12:05 PM  Performed by: Modesta Solo MD  Authorized by: Modesta Solo MD     Consent:     Consent obtained:  Verbal    Consent given by:  Patient    Risks discussed:  False passage, infection, urethral injury and pain    Alternatives discussed:  No treatment  Pre-procedure details:     Procedure purpose:  Therapeutic  Anesthesia (see MAR for exact dosages):      Anesthesia method:  None  Procedure details:     Provider performed due to:  Complicated insertion    Catheter insertion:  Indwelling    Catheter type: Iglesias    Catheter size:  16 Fr    Bladder irrigation: no      Number of attempts:  1    Urine characteristics:  Yellow  Post-procedure details:     Patient tolerance of procedure: Tolerated well, no immediate complications  Comments:      Iglesias catheter was noted to be in the bladder on bedside ultrasound. Images were not obtained. Work-up in the department consisted of labs, urinalysis, and replacement of Iglesias catheter as above. Urinalysis is not really concerning for an acute/occult infection so we will hold off her treatment at this time and send urine for culture. His kidney function appears to be at baseline. This point I think the patient is stable for discharge home. Please see PA-C note for reassessments upon disposition.        Carter Griffin MD  04/17/21 [facility] Analisa Bruce MD  05/01/21 5296

## 2021-04-17 NOTE — ED PROVIDER NOTES
[address] [facility] ENCOUNTER          PHYSICIAN ASSISTANT NOTE       Date of evaluation: 4/17/2021    Chief Complaint     Iglesias catheter problem    History of Present Illness     Aniyah Thomas is a 79 y.o. male with PMH HTN, HLD, BPH with hx of urinary retention with chronic Iglesias in place who presents from home after [place] [address] nurse evaluation and concern for UTI. Patient lives at home by himself and has a home health aide that comes twice a week along with a home health nurse that comes weekly. He states that his Iglesias is typically changed monthly. States that he did not have the Iglesias changed today, as the nurse was concerned for possible infection, so she sent him to the emergency department. Patient does also endorse recent cough, headache, and diarrhea over the past couple of days. Denies any chest pain, difficulty breathing, fevers, chills, diaphoresis, abdominal pain, nausea, vomiting. Review of Systems     Review of Systems   See HPI, all others negative. Past Medical, Surgical, Family, and Social History     He has a past medical history of BPH (benign prostatic hyperplasia), Carotid stenosis, Cellulitis, Chronic back pain, Diverticular disease, Erectile dysfunction, GERD (gastroesophageal reflux disease), History of atrial fibrillation, Hypercholesteremia, Hypertension, Lower GI bleed, MDRO (multiple drug resistant organisms) resistance, Neuromuscular disorder ([facility] [address] Renal insufficiency, Risk for falls, Tinea corporis, and Vitamin B12 deficiency. He has a past surgical history that includes back surgery (2006); Foot surgery; Coronary artery bypass graft (12/13/2013); hip surgery (Right, 5/1/2015); Cystoscopy (N/A, 1/10/2019); Upper gastrointestinal endoscopy (N/A, 5/4/2020); and sigmoidoscopy (N/A, 6/11/2020). His family history includes Heart Disease in his father. He reports that he quit smoking about 51 years ago.  He has never used smokeless tobacco. He reports that he Genitourinary:     Comments: Iglesias catheter in place, but Iglesias does appear somewhat dirty. Small amount of erythema to distal portion of penis. No significant tenderness to palpation. No scrotal swelling, warmth, or tenderness. Musculoskeletal: Normal range of motion. Neurological:      General: No focal deficit present. Mental Status: He is alert and oriented to person, place, and time. Diagnostic Results     RADIOLOGY:  XR CHEST PORTABLE   Final Result   1. No acute airspace disease. 2. Chronic findings as detailed above.           LABS:   Results for orders placed or performed during the hospital encounter of 04/17/21   Urinalysis, reflex to microscopic   Result Value Ref Range    Color, UA Yellow Straw/Yellow    Clarity, UA Clear Clear    Glucose, Ur Negative Negative mg/dL    Bilirubin Urine Negative Negative    Ketones, Urine Negative Negative mg/dL    Specific Gravity, UA 1.020 1.005 - 1.030    Blood, Urine TRACE-INTACT (A) Negative    pH, UA 6.0 5.0 - 8.0    Protein, UA 30 (A) Negative mg/dL    Urobilinogen, Urine 0.2 <2.0 E.U./dL    Nitrite, Urine Negative Negative    Leukocyte Esterase, Urine SMALL (A) Negative    Microscopic Examination YES     Urine Type NotGiven    CBC Auto Differential   Result Value Ref Range    WBC 4.9 4.0 - 11.0 K/uL    RBC 3.35 (L) 4.20 - 5.90 M/uL    Hemoglobin 10.3 (L) 13.5 - 17.5 g/dL    Hematocrit 32.0 (L) 40.5 - 52.5 %    MCV 95.4 80.0 - 100.0 fL    MCH 30.7 26.0 - 34.0 pg    MCHC 32.2 31.0 - 36.0 g/dL    RDW 17.1 (H) 12.4 - 15.4 %    Platelets 663 773 - 089 K/uL    MPV 9.4 5.0 - 10.5 fL    Neutrophils % 67.7 %    Lymphocytes % 19.5 %    Monocytes % 6.1 %    Eosinophils % 6.0 %    Basophils % 0.7 %    Neutrophils Absolute 3.3 1.7 - 7.7 K/uL    Lymphocytes Absolute 1.0 1.0 - 5.1 K/uL    Monocytes Absolute 0.3 0.0 - 1.3 K/uL    Eosinophils Absolute 0.3 0.0 - 0.6 K/uL    Basophils Absolute 0.0 0.0 - 0.2 K/uL   Basic Metabolic Panel w/ Reflex to MG   Result Value Ref Range    Sodium 143 136 - 145 mmol/L    Potassium reflex Magnesium 4.8 3.5 - 5.1 mmol/L    Chloride 106 99 - 110 mmol/L    CO2 27 21 - 32 mmol/L    Anion Gap 10 3 - 16    Glucose 95 70 - 99 mg/dL    BUN 44 (H) 7 - 20 mg/dL    CREATININE 1.9 (H) 0.8 - 1.3 mg/dL    GFR Non- 34 (A) >60    GFR  42 (A) >60    Calcium 8.8 8.3 - 10.6 mg/dL   Microscopic Urinalysis   Result Value Ref Range    Hyaline Casts, UA 0-2 0 - 2 /LPF    WBC, UA 6-9 (A) 0 - 5 /HPF    RBC, UA 0-2 0 - 4 /HPF    Bacteria, UA 3+ (A) None Seen /HPF       RECENT VITALS:  BP: (!) 133/57, Temp: 98.6 °F (37 °C), Pulse: 59, Resp: 17, SpO2: 97 %     Procedures       ED Course     Nursing Notes, Past Medical Hx,Past Surgical Hx, Social Hx, Allergies, and Family Hx were reviewed. The patient was given the following medications:  No orders of the defined types were placed in this encounter. CONSULTS:  None    MEDICAL DECISION MAKING / ASSESSMENT / PLAN     Bao Frazier is a 79 y.o. male presenting from home after home health care nurse was concerned for possible UTI. Patient does appear slightly poorly groomed, discussed increasing home health care which patient states he is in the process of doing. Patient states that he ultimately wants to go back home and does not want to go to an assisted living facility or nursing home. Iglesias was exchanged by Dr. Ying Tucekr without any difficulty. Patient has no signs of superficial skin infection on exam and no tenderness to palpation of genital region including scrotum. Patient has no abdominal tenderness on exam.  Given patient's history of cough and diarrhea, chest x-ray and labs were ordered. CBC shows no leukocytosis. BMP shows normal electrolytes and creatinine at baseline. Chest x-ray shows no acute abnormality. UA shows 6-9 white blood cells with 3+ bacteria, although questionable if this is acute or related to chronic indwelling Iglesias catheter.   Urine was sent for culture. Patient is otherwise afebrile, has no systemic signs of infection, has no leukocytosis, so we will hold off on antibiotics at this time. Discussed this with patient and discussed that patient would be called if urine culture grew out bacteria requiring antibiotics. Patient was agreeable and was discharged back home in stable condition with normal vitals and given strict return precautions. This patient was also evaluated by the attending physician. All care plans were discussed and agreed upon. Clinical Impression     1. Problem with Iglesias catheter, initial encounter ([address]        Disposition     PATIENT REFERRED TO:  No follow-up provider specified.     DISCHARGE MEDICATIONS:  New Prescriptions    No medications on file       [facility]  04/17/21 [facility]  04/29/21 1199

## 2021-04-17 NOTE — ED TRIAGE NOTES
Pt presents to the ED c/o pain at the insertion site of his urinary catheter. He states that [address] came to change the catheter but she thought that it may be infected so she didn't change it and instead sent him here. Patient lives at home alone but has home health aides and nurses that help him with bathing and nursing problems.

## 2021-04-17 NOTE — ED NOTES
Patient sitting in bed watching tv. He was encouraged to lay on his side to prevent further breakdown of his skin. Upon arrival patient states that he has some bed sores. This nurse and the PA assist patient to his R side for visualization. Skin is red and scaly and blanchable in most areas with some non-blanchable areas near the coccyx. He states that this has likely worsened because he dealt with diarrhea recently and had to wipe a lot.       Michael Raza RN  04/17/21 2701

## 2021-04-20 LAB — URINE CULTURE, ROUTINE: NORMAL

## 2021-04-21 ENCOUNTER — APPOINTMENT (OUTPATIENT)
Dept: GENERAL RADIOLOGY | Age: 81
DRG: 871 | End: 2021-04-21
Payer: MEDICARE

## 2021-04-21 ENCOUNTER — APPOINTMENT (OUTPATIENT)
Dept: CT IMAGING | Age: 81
DRG: 871 | End: 2021-04-21
Payer: MEDICARE

## 2021-04-21 ENCOUNTER — HOSPITAL ENCOUNTER (INPATIENT)
Age: 81
LOS: 3 days | Discharge: NURSING, REHAB OR LONG-TERM CARE | DRG: 871 | End: 2021-04-24
Attending: EMERGENCY MEDICINE | Admitting: INTERNAL MEDICINE
Payer: MEDICARE

## 2021-04-21 DIAGNOSIS — E87.5 HYPERKALEMIA: ICD-10-CM

## 2021-04-21 DIAGNOSIS — R41.82 ALTERED MENTAL STATUS, UNSPECIFIED ALTERED MENTAL STATUS TYPE: Primary | ICD-10-CM

## 2021-04-21 DIAGNOSIS — T68.XXXA HYPOTHERMIA, INITIAL ENCOUNTER: ICD-10-CM

## 2021-04-21 PROBLEM — G93.40 ENCEPHALOPATHY ACUTE: Status: ACTIVE | Noted: 2021-04-21

## 2021-04-21 LAB
AMORPHOUS: ABNORMAL /HPF
ANION GAP SERPL CALCULATED.3IONS-SCNC: 10 MMOL/L (ref 3–16)
BACTERIA: ABNORMAL /HPF
BASE EXCESS VENOUS: -1.9 MMOL/L (ref -2–3)
BASOPHILS ABSOLUTE: 0 K/UL (ref 0–0.2)
BASOPHILS RELATIVE PERCENT: 0.2 %
BILIRUBIN URINE: NEGATIVE
BLOOD, URINE: ABNORMAL
BUN BLDV-MCNC: 41 MG/DL (ref 7–20)
CALCIUM SERPL-MCNC: 9.8 MG/DL (ref 8.3–10.6)
CARBOXYHEMOGLOBIN: 0.9 % (ref 0–1.5)
CHLORIDE BLD-SCNC: 110 MMOL/L (ref 99–110)
CLARITY: CLEAR
CO2: 22 MMOL/L (ref 21–32)
COLOR: YELLOW
CREAT SERPL-MCNC: 1.9 MG/DL (ref 0.8–1.3)
EKG ATRIAL RATE: 55 BPM
EKG ATRIAL RATE: 88 BPM
EKG DIAGNOSIS: NORMAL
EKG DIAGNOSIS: NORMAL
EKG P AXIS: 36 DEGREES
EKG P-R INTERVAL: 242 MS
EKG Q-T INTERVAL: 402 MS
EKG Q-T INTERVAL: 486 MS
EKG QRS DURATION: 86 MS
EKG QRS DURATION: 92 MS
EKG QTC CALCULATION (BAZETT): 464 MS
EKG QTC CALCULATION (BAZETT): 486 MS
EKG R AXIS: 31 DEGREES
EKG R AXIS: 53 DEGREES
EKG T AXIS: 45 DEGREES
EKG T AXIS: 52 DEGREES
EKG VENTRICULAR RATE: 55 BPM
EKG VENTRICULAR RATE: 88 BPM
EOSINOPHILS ABSOLUTE: 0 K/UL (ref 0–0.6)
EOSINOPHILS RELATIVE PERCENT: 0.1 %
EPITHELIAL CELLS, UA: ABNORMAL /HPF (ref 0–5)
GFR AFRICAN AMERICAN: 42
GFR NON-AFRICAN AMERICAN: 34
GLUCOSE BLD-MCNC: 104 MG/DL (ref 70–99)
GLUCOSE BLD-MCNC: 78 MG/DL (ref 70–99)
GLUCOSE BLD-MCNC: 87 MG/DL (ref 70–99)
GLUCOSE URINE: NEGATIVE MG/DL
HCO3 VENOUS: 26.5 MMOL/L (ref 24–28)
HCT VFR BLD CALC: 39.2 % (ref 40.5–52.5)
HEMOGLOBIN, VEN, REDUCED: 44.8 %
HEMOGLOBIN: 12.7 G/DL (ref 13.5–17.5)
KETONES, URINE: 15 MG/DL
LACTIC ACID, SEPSIS: 1.9 MMOL/L (ref 0.4–1.9)
LACTIC ACID: 1.4 MMOL/L (ref 0.4–2)
LEUKOCYTE ESTERASE, URINE: ABNORMAL
LYMPHOCYTES ABSOLUTE: 0.6 K/UL (ref 1–5.1)
LYMPHOCYTES RELATIVE PERCENT: 6.6 %
MAGNESIUM: 2.2 MG/DL (ref 1.8–2.4)
MCH RBC QN AUTO: 30.9 PG (ref 26–34)
MCHC RBC AUTO-ENTMCNC: 32.5 G/DL (ref 31–36)
MCV RBC AUTO: 94.9 FL (ref 80–100)
METHEMOGLOBIN VENOUS: 0.5 % (ref 0–1.5)
MICROSCOPIC EXAMINATION: YES
MONOCYTES ABSOLUTE: 0.3 K/UL (ref 0–1.3)
MONOCYTES RELATIVE PERCENT: 3.5 %
NEUTROPHILS ABSOLUTE: 8.3 K/UL (ref 1.7–7.7)
NEUTROPHILS RELATIVE PERCENT: 89.6 %
NITRITE, URINE: NEGATIVE
O2 SAT, VEN: 55 %
PCO2, VEN: 60.8 MMHG (ref 41–51)
PDW BLD-RTO: 17.3 % (ref 12.4–15.4)
PERFORMED ON: ABNORMAL
PERFORMED ON: NORMAL
PH UA: 6 (ref 5–8)
PH VENOUS: 7.25 (ref 7.35–7.45)
PLATELET # BLD: 134 K/UL (ref 135–450)
PMV BLD AUTO: 9.2 FL (ref 5–10.5)
PO2, VEN: 34 MMHG (ref 25–40)
POTASSIUM REFLEX MAGNESIUM: 7 MMOL/L (ref 3.5–5.1)
POTASSIUM SERPL-SCNC: 5.2 MMOL/L (ref 3.5–5.1)
POTASSIUM SERPL-SCNC: 6.3 MMOL/L (ref 3.5–5.1)
PROCALCITONIN: 0.16 NG/ML (ref 0–0.15)
PROTEIN UA: >=300 MG/DL
RBC # BLD: 4.13 M/UL (ref 4.2–5.9)
RBC UA: ABNORMAL /HPF (ref 0–4)
SODIUM BLD-SCNC: 142 MMOL/L (ref 136–145)
SPECIFIC GRAVITY UA: 1.02 (ref 1–1.03)
TCO2 CALC VENOUS: 28 MMOL/L
TROPONIN: 0.04 NG/ML
URINE REFLEX TO CULTURE: ABNORMAL
URINE TYPE: ABNORMAL
UROBILINOGEN, URINE: 0.2 E.U./DL
WBC # BLD: 9.3 K/UL (ref 4–11)
WBC UA: ABNORMAL /HPF (ref 0–5)

## 2021-04-21 PROCEDURE — 96375 TX/PRO/DX INJ NEW DRUG ADDON: CPT

## 2021-04-21 PROCEDURE — 81001 URINALYSIS AUTO W/SCOPE: CPT

## 2021-04-21 PROCEDURE — 82803 BLOOD GASES ANY COMBINATION: CPT

## 2021-04-21 PROCEDURE — 82570 ASSAY OF URINE CREATININE: CPT

## 2021-04-21 PROCEDURE — 84540 ASSAY OF URINE/UREA-N: CPT

## 2021-04-21 PROCEDURE — 85025 COMPLETE CBC W/AUTO DIFF WBC: CPT

## 2021-04-21 PROCEDURE — 84484 ASSAY OF TROPONIN QUANT: CPT

## 2021-04-21 PROCEDURE — 87040 BLOOD CULTURE FOR BACTERIA: CPT

## 2021-04-21 PROCEDURE — 84300 ASSAY OF URINE SODIUM: CPT

## 2021-04-21 PROCEDURE — 71045 X-RAY EXAM CHEST 1 VIEW: CPT

## 2021-04-21 PROCEDURE — 84145 PROCALCITONIN (PCT): CPT

## 2021-04-21 PROCEDURE — 83930 ASSAY OF BLOOD OSMOLALITY: CPT

## 2021-04-21 PROCEDURE — 83735 ASSAY OF MAGNESIUM: CPT

## 2021-04-21 PROCEDURE — 83605 ASSAY OF LACTIC ACID: CPT

## 2021-04-21 PROCEDURE — 80048 BASIC METABOLIC PNL TOTAL CA: CPT

## 2021-04-21 PROCEDURE — 6370000000 HC RX 637 (ALT 250 FOR IP): Performed by: STUDENT IN AN ORGANIZED HEALTH CARE EDUCATION/TRAINING PROGRAM

## 2021-04-21 PROCEDURE — 36415 COLL VENOUS BLD VENIPUNCTURE: CPT

## 2021-04-21 PROCEDURE — 84132 ASSAY OF SERUM POTASSIUM: CPT

## 2021-04-21 PROCEDURE — 84156 ASSAY OF PROTEIN URINE: CPT

## 2021-04-21 PROCEDURE — 6370000000 HC RX 637 (ALT 250 FOR IP): Performed by: EMERGENCY MEDICINE

## 2021-04-21 PROCEDURE — 96374 THER/PROPH/DIAG INJ IV PUSH: CPT

## 2021-04-21 PROCEDURE — 2060000000 HC ICU INTERMEDIATE R&B

## 2021-04-21 PROCEDURE — 70450 CT HEAD/BRAIN W/O DYE: CPT

## 2021-04-21 PROCEDURE — 6360000002 HC RX W HCPCS: Performed by: STUDENT IN AN ORGANIZED HEALTH CARE EDUCATION/TRAINING PROGRAM

## 2021-04-21 PROCEDURE — 93005 ELECTROCARDIOGRAM TRACING: CPT | Performed by: EMERGENCY MEDICINE

## 2021-04-21 PROCEDURE — 2580000003 HC RX 258: Performed by: STUDENT IN AN ORGANIZED HEALTH CARE EDUCATION/TRAINING PROGRAM

## 2021-04-21 PROCEDURE — 51702 INSERT TEMP BLADDER CATH: CPT

## 2021-04-21 PROCEDURE — 96361 HYDRATE IV INFUSION ADD-ON: CPT

## 2021-04-21 PROCEDURE — 2580000003 HC RX 258: Performed by: EMERGENCY MEDICINE

## 2021-04-21 PROCEDURE — 99284 EMERGENCY DEPT VISIT MOD MDM: CPT

## 2021-04-21 PROCEDURE — 6360000002 HC RX W HCPCS: Performed by: EMERGENCY MEDICINE

## 2021-04-21 RX ORDER — PANTOPRAZOLE SODIUM 40 MG/1
40 TABLET, DELAYED RELEASE ORAL
Status: DISCONTINUED | OUTPATIENT
Start: 2021-04-22 | End: 2021-04-24 | Stop reason: HOSPADM

## 2021-04-21 RX ORDER — SODIUM CHLORIDE 0.9 % (FLUSH) 0.9 %
5-40 SYRINGE (ML) INJECTION EVERY 12 HOURS SCHEDULED
Status: DISCONTINUED | OUTPATIENT
Start: 2021-04-21 | End: 2021-04-24 | Stop reason: HOSPADM

## 2021-04-21 RX ORDER — CALCIUM GLUCONATE 94 MG/ML
1000 INJECTION, SOLUTION INTRAVENOUS ONCE
Status: COMPLETED | OUTPATIENT
Start: 2021-04-21 | End: 2021-04-21

## 2021-04-21 RX ORDER — SODIUM CHLORIDE 9 MG/ML
INJECTION, SOLUTION INTRAVENOUS CONTINUOUS
Status: DISCONTINUED | OUTPATIENT
Start: 2021-04-21 | End: 2021-04-22

## 2021-04-21 RX ORDER — TAMSULOSIN HYDROCHLORIDE 0.4 MG/1
0.8 CAPSULE ORAL DAILY
Status: DISCONTINUED | OUTPATIENT
Start: 2021-04-22 | End: 2021-04-24 | Stop reason: HOSPADM

## 2021-04-21 RX ORDER — DEXTROSE MONOHYDRATE 25 G/50ML
50 INJECTION, SOLUTION INTRAVENOUS ONCE
Status: COMPLETED | OUTPATIENT
Start: 2021-04-21 | End: 2021-04-21

## 2021-04-21 RX ORDER — SODIUM CHLORIDE 9 MG/ML
25 INJECTION, SOLUTION INTRAVENOUS PRN
Status: DISCONTINUED | OUTPATIENT
Start: 2021-04-21 | End: 2021-04-24 | Stop reason: HOSPADM

## 2021-04-21 RX ORDER — METOCLOPRAMIDE HYDROCHLORIDE 5 MG/ML
10 INJECTION INTRAMUSCULAR; INTRAVENOUS ONCE
Status: DISCONTINUED | OUTPATIENT
Start: 2021-04-21 | End: 2021-04-21

## 2021-04-21 RX ORDER — ASPIRIN 81 MG/1
81 TABLET, CHEWABLE ORAL DAILY
Status: DISCONTINUED | OUTPATIENT
Start: 2021-04-22 | End: 2021-04-24 | Stop reason: HOSPADM

## 2021-04-21 RX ORDER — SODIUM POLYSTYRENE SULFONATE 15 G/60ML
15 SUSPENSION ORAL; RECTAL ONCE
Status: COMPLETED | OUTPATIENT
Start: 2021-04-21 | End: 2021-04-22

## 2021-04-21 RX ORDER — ACETAMINOPHEN 650 MG/1
650 SUPPOSITORY RECTAL EVERY 6 HOURS PRN
Status: DISCONTINUED | OUTPATIENT
Start: 2021-04-21 | End: 2021-04-23

## 2021-04-21 RX ORDER — SODIUM CHLORIDE 9 MG/ML
INJECTION, SOLUTION INTRAVENOUS
Status: DISCONTINUED
Start: 2021-04-21 | End: 2021-04-21

## 2021-04-21 RX ORDER — 0.9 % SODIUM CHLORIDE 0.9 %
30 INTRAVENOUS SOLUTION INTRAVENOUS ONCE
Status: COMPLETED | OUTPATIENT
Start: 2021-04-21 | End: 2021-04-21

## 2021-04-21 RX ORDER — SODIUM CHLORIDE 0.9 % (FLUSH) 0.9 %
5-40 SYRINGE (ML) INJECTION PRN
Status: DISCONTINUED | OUTPATIENT
Start: 2021-04-21 | End: 2021-04-24 | Stop reason: HOSPADM

## 2021-04-21 RX ORDER — ATORVASTATIN CALCIUM 80 MG/1
80 TABLET, FILM COATED ORAL NIGHTLY
Status: DISCONTINUED | OUTPATIENT
Start: 2021-04-21 | End: 2021-04-24 | Stop reason: HOSPADM

## 2021-04-21 RX ORDER — HEPARIN SODIUM 5000 [USP'U]/ML
5000 INJECTION, SOLUTION INTRAVENOUS; SUBCUTANEOUS EVERY 8 HOURS SCHEDULED
Status: DISCONTINUED | OUTPATIENT
Start: 2021-04-22 | End: 2021-04-24 | Stop reason: HOSPADM

## 2021-04-21 RX ORDER — LANOLIN ALCOHOL/MO/W.PET/CERES
1000 CREAM (GRAM) TOPICAL DAILY
Status: DISCONTINUED | OUTPATIENT
Start: 2021-04-22 | End: 2021-04-24 | Stop reason: HOSPADM

## 2021-04-21 RX ORDER — ACETAMINOPHEN 325 MG/1
650 TABLET ORAL EVERY 6 HOURS PRN
Status: DISCONTINUED | OUTPATIENT
Start: 2021-04-21 | End: 2021-04-23

## 2021-04-21 RX ADMIN — CALCIUM GLUCONATE 1000 MG: 98 INJECTION, SOLUTION INTRAVENOUS at 17:25

## 2021-04-21 RX ADMIN — DEXTROSE MONOHYDRATE 50 ML: 25 INJECTION, SOLUTION INTRAVENOUS at 18:10

## 2021-04-21 RX ADMIN — INSULIN HUMAN 10 UNITS: 100 INJECTION, SOLUTION PARENTERAL at 18:09

## 2021-04-21 RX ADMIN — Medication 5 ML: at 22:30

## 2021-04-21 RX ADMIN — SODIUM CHLORIDE: 9 INJECTION, SOLUTION INTRAVENOUS at 22:41

## 2021-04-21 RX ADMIN — ATORVASTATIN CALCIUM 80 MG: 80 TABLET, FILM COATED ORAL at 23:03

## 2021-04-21 RX ADMIN — SODIUM CHLORIDE 1500 MG: 900 INJECTION INTRAVENOUS at 23:56

## 2021-04-21 RX ADMIN — SODIUM CHLORIDE 2328 ML: 9 INJECTION, SOLUTION INTRAVENOUS at 16:21

## 2021-04-21 RX ADMIN — VANCOMYCIN HYDROCHLORIDE 1500 MG: 10 INJECTION, POWDER, LYOPHILIZED, FOR SOLUTION INTRAVENOUS at 22:50

## 2021-04-21 ASSESSMENT — VISUAL ACUITY: OU: 1

## 2021-04-21 NOTE — ED PROVIDER NOTES
[address] ENCOUNTER          ATTENDING PHYSICIAN NOTE       Date of evaluation: 4/21/2021    Chief Complaint     Altered Mental Status (Recent d/c for UTI, now acting off - by talking to his catheter bag as a telephone and trying to eat plastic. )      History of Present Illness     Georgette Randall is a 79 y.o. male with a history of BPH and indwelling catheter as well as coronary artery disease, A. fib, hypertension, and hypercholesterolemia who presents with altered mental status from home where he has a home health nurse coming in. Apparently he is less interactive than normal and has been using his catheter as a telephone and attempting to talk through it. History is obtained from EMS initially. EMS made several runs to his house yesterday, called by the home health nurse. The patient is speaking minimally and very slow to answer any questions if at all. History from family revealed that he lives at home alone and usually is able to care for himself and can make some of his own food in the microwave but only has a nurse coming in once a week. The daughter found him on the floor today unable to get up and called 911 for him to be brought to the hospital.  She says that he is speaking and interacting much less today than what he normally would. He has not given her any history either but typically does not share anything with her in terms of anything that is bothering him or medical issues. She is concerned about his safety at home where he lives alone and was found on the floor and had to EMS runs to the house yesterday for the same issue.       Review of Systems     Review of Systems   Unable to perform ROS: Mental status change       Past Medical, Surgical, Family, and Social History     He has a past medical history of BPH (benign prostatic hyperplasia), Carotid stenosis, Cellulitis, Chronic back pain, Diverticular disease, Erectile dysfunction, GERD (gastroesophageal reflux disease), History of atrial fibrillation, Hypercholesteremia, Hypertension, Lower GI bleed, MDRO (multiple drug resistant organisms) resistance, Neuromuscular disorder ([address] Renal insufficiency, Risk for falls, Tinea corporis, and Vitamin B12 deficiency. He has a past surgical history that includes back surgery (2006); Foot surgery; Coronary artery bypass graft (12/13/2013); hip surgery (Right, 5/1/2015); Cystoscopy (N/A, 1/10/2019); Upper gastrointestinal endoscopy (N/A, 5/4/2020); and sigmoidoscopy (N/A, 6/11/2020). His family history includes Heart Disease in his father. He reports that he quit smoking about 51 years ago. He has never used smokeless tobacco. He reports that he does not drink alcohol or use drugs. Medications     Previous Medications    ASPIRIN 81 MG CHEWABLE TABLET    Take 1 tablet by mouth daily    ATORVASTATIN (LIPITOR) 80 MG TABLET    Take 80 mg by mouth nightly. BALSAM PERU-CASTOR OIL (VENELEX) OINT OINTMENT    Apply topically 2 times daily    DICYCLOMINE (BENTYL) 20 MG TABLET    Take 20 mg by mouth 3 times daily as needed    DOCUSATE SODIUM (COLACE) 100 MG CAPSULE    Take 1 capsule by mouth 2 times daily    FUROSEMIDE (LASIX) 40 MG TABLET    TAKE 1 TABLET TWICE DAILY    GABAPENTIN (NEURONTIN) 600 MG TABLET    Take 600 mg by mouth 2 times daily. MELATONIN 10 MG TABS    Take 10 mg by mouth nightly as needed     PANTOPRAZOLE (PROTONIX) 40 MG TABLET    Take 1 tablet by mouth every morning (before breakfast)    TAMSULOSIN (FLOMAX) 0.4 MG CAPSULE    Take 0.8 mg by mouth daily     VITAMIN B-12 (CYANOCOBALAMIN) 1000 MCG TABLET    Take 1,000 mcg by mouth daily    ZOLPIDEM (AMBIEN) 10 MG TABLET    Take 10 mg by mouth nightly as needed for Sleep. Allergies     He is allergic to bactrim [sulfamethoxazole-trimethoprim].     Physical Exam     INITIAL VITALS: BP: (!) 170/94, Temp: 92 °F (33.3 °C), Pulse: 116, Resp: 18, SpO2: 100 %   Physical Exam  Vitals signs and nursing note reviewed. Constitutional:       General: He is sleeping. He is not in acute distress. Appearance: He is ill-appearing. He is not diaphoretic. Cardiovascular:      Rate and Rhythm: Regular rhythm. Tachycardia present. Pulmonary:      Effort: Pulmonary effort is normal.      Breath sounds: Normal breath sounds. Abdominal:      General: Bowel sounds are normal. There is no distension. Palpations: Abdomen is soft. Tenderness: There is no abdominal tenderness. Skin:     General: Skin is warm and dry. Findings: No rash. Comments: Stage II decubitus on perianal area of buttocks and portions of the scrotum, as well as heels   Neurological:      Mental Status: He is lethargic. Diagnostic Results     EKG   Interpreted by Corbett Barthel, MD     Rhythm: normal sinus   Rate: normal  Axis: normal  Ectopy: none  Conduction: 1st degree AVB  ST Segments: no acute change  T Waves: no acute change  Q Waves:anterior    Clinical Impression: normal sinus rhythm with no acute changes      RADIOLOGY:  CT HEAD WO CONTRAST   Final Result   1. No CT evidence of an acute intracranial hemorrhage, mass effect or midline structural shift. 2. Incidental mild bilateral basal ganglia calcifications. Limited exam due to poor patient positioning in the scanner.       XR CHEST PORTABLE   Final Result      Cardiac enlargement and mild vascular congestion                      LABS:   Results for orders placed or performed during the hospital encounter of 04/21/21   Lactate, Sepsis   Result Value Ref Range    Lactic Acid, Sepsis 1.9 0.4 - 1.9 mmol/L   Urinalysis Reflex to Culture    Specimen: Urine, clean catch   Result Value Ref Range    Color, UA Yellow Straw/Yellow    Clarity, UA Clear Clear    Glucose, Ur Negative Negative mg/dL    Bilirubin Urine Negative Negative    Ketones, Urine 15 (A) Negative mg/dL    Specific Gravity, UA 1.025 1.005 - 1.030    Blood, Urine LARGE (A) Negative    pH, UA 6.0 5.0 - 8.0    Protein, UA >=300 (A) Negative mg/dL    Urobilinogen, Urine 0.2 <2.0 E.U./dL    Nitrite, Urine Negative Negative    Leukocyte Esterase, Urine TRACE (A) Negative    Microscopic Examination YES     Urine Type NotGiven     Urine Reflex to Culture Not Indicated    CBC auto differential   Result Value Ref Range    WBC 9.3 4.0 - 11.0 K/uL    RBC 4.13 (L) 4.20 - 5.90 M/uL    Hemoglobin 12.7 (L) 13.5 - 17.5 g/dL    Hematocrit 39.2 (L) 40.5 - 52.5 %    MCV 94.9 80.0 - 100.0 fL    MCH 30.9 26.0 - 34.0 pg    MCHC 32.5 31.0 - 36.0 g/dL    RDW 17.3 (H) 12.4 - 15.4 %    Platelets 133 (L) 899 - 450 K/uL    MPV 9.2 5.0 - 10.5 fL    Neutrophils % 89.6 %    Lymphocytes % 6.6 %    Monocytes % 3.5 %    Eosinophils % 0.1 %    Basophils % 0.2 %    Neutrophils Absolute 8.3 (H) 1.7 - 7.7 K/uL    Lymphocytes Absolute 0.6 (L) 1.0 - 5.1 K/uL    Monocytes Absolute 0.3 0.0 - 1.3 K/uL    Eosinophils Absolute 0.0 0.0 - 0.6 K/uL    Basophils Absolute 0.0 0.0 - 0.2 K/uL   Basic Metabolic Panel w/ Reflex to MG   Result Value Ref Range    Sodium 142 136 - 145 mmol/L    Potassium reflex Magnesium 7.0 (HH) 3.5 - 5.1 mmol/L    Chloride 110 99 - 110 mmol/L    CO2 22 21 - 32 mmol/L    Anion Gap 10 3 - 16    Glucose 87 70 - 99 mg/dL    BUN 41 (H) 7 - 20 mg/dL    CREATININE 1.9 (H) 0.8 - 1.3 mg/dL    GFR Non- 34 (A) >60    GFR  42 (A) >60    Calcium 9.8 8.3 - 10.6 mg/dL   Blood gas, venous   Result Value Ref Range    pH, Nick 7.249 (L) 7.350 - 7.450    pCO2, Nick 60.8 (H) 41.0 - 51.0 mmHg    pO2, Nick 34.0 25 - 40 mmHg    HCO3, Venous 26.5 24.0 - 28.0 mmol/L    Base Excess, Nick -1.9 -2.0 - 3.0 mmol/L    O2 Sat, Nick 55 Not established %    Carboxyhemoglobin 0.9 0.0 - 1.5 %    MetHgb, Nick 0.5 0.0 - 1.5 %    TC02 (Calc), Nick 28 mmol/L    Hemoglobin, Nick, Reduced 44.80 %   Potassium (Lab)   Result Value Ref Range    Potassium 6.3 (HH) 3.5 - 5.1 mmol/L   Lactate, plasma   Result Value Ref Range    Lactic Acid 1.4 0.4 - 2.0 mmol/L   Troponin (lab)   Result Value Ref Range    Troponin 0.04 (H) <0.01 ng/mL   Microscopic Urinalysis   Result Value Ref Range    WBC, UA 3-5 0 - 5 /HPF    RBC, UA 21-50 (A) 0 - 4 /HPF    Epithelial Cells, UA 0-1 0 - 5 /HPF    Bacteria, UA 2+ (A) None Seen /HPF    Amorphous, UA 1+ /HPF   POCT Glucose   Result Value Ref Range    POC Glucose 78 70 - 99 mg/dl    Performed on ACCU-CHEK    POCT Glucose   Result Value Ref Range    POC Glucose 104 (H) 70 - 99 mg/dl    Performed on ACCU-CHEK    EKG 12 Lead   Result Value Ref Range    Ventricular Rate 55 BPM    Atrial Rate 55 BPM    QRS Duration 92 ms    Q-T Interval 486 ms    QTc Calculation (Bazett) 464 ms    R Axis 31 degrees    T Axis 45 degrees    Diagnosis       EKG performed in ER and to be interpreted by ER physician. Confirmed by MD, ER ([id number]),  Charbel HILL 15 ([id number]) on 4/21/2021 6:30:18 PM   EKG 12 Lead   Result Value Ref Range    Ventricular Rate 88 BPM    Atrial Rate 88 BPM    P-R Interval 242 ms    QRS Duration 86 ms    Q-T Interval 402 ms    QTc Calculation (Bazett) 486 ms    P Axis 36 degrees    R Axis 53 degrees    T Axis 52 degrees    Diagnosis       EKG performed in ER and to be interpreted by ER physician. Confirmed by MD, ER ([id number]),  Supriya Machado ([phone]) on 4/21/2021 7:21:35 PM       RECENT VITALS:  BP: (!) 117/55,Temp: 92 °F (33.3 °C), Pulse: 78, Resp: 18, SpO2: 100 %       ED Course     Nursing Notes, Past Medical Hx, Past Surgical Hx, Social Hx,Allergies, and Family Hx were reviewed.     patient was given the following medications:  Orders Placed This Encounter   Medications    0.9 % sodium chloride IV bolus 2,328 mL    sodium chloride 0.9 % infusion     Romina Evangelista: cabinet override    calcium gluconate 10 % injection 1,000 mg    insulin regular (HUMULIN R;NOVOLIN R) injection 10 Units    dextrose 50 % IV solution    DISCONTD: metoclopramide (REGLAN) injection 10 mg       CONSULTS:  IP CONSULT TO CRITICAL CARE  IP CONSULT TO HOSPITALIST  IP CONSULT TO SOCIAL WORK    MEDICAL DECISIONMAKING / ASSESSMENT / PLAN     Harry Nazario is a 79 y.o. male presenting from home where he was found on the floor after EMS had to be called because he had an altered mental status when found by his daughter and has been unable to care for himself. On arrival, the patient was noted to be hypothermic and have decubitus ulcers on his buttocks and feet. He does not appear to be taking care of himself very well. A kole hugger was used to warm him up and lab evaluation did reveal hyperkalemia but there were no EKG changes worrisome for hyperkalemia. The patient did not appear to be septic at this point in time given the normal lactic acid and white blood cell count and he was not tachycardic or febrile here. His hypothermia as a result can be treated in the PCU. He will be admitted for change in mental status elated to these findings. Critical Care:  Due to the immediate potential for life-threatening deterioration due to hypothermia with altered mental status, I spent 30 minutes providing critical care. This time excludes time spent performing procedures but includes time spent on direct patient care, history retrieval, review of the chart, and discussions with patient, family, and consultant(s). Clinical Impression     1. Altered mental status, unspecified altered mental status type    2. Hypothermia, initial encounter    3.  Hyperkalemia        Disposition     DISPOSITION         Troy Giang MD  04/21/21 2041

## 2021-04-21 NOTE — ED NOTES
Bed: 1TR-01  Expected date:   Expected time:   Means of arrival:   Comments:  Nicolas Nino RN  04/21/21 8043

## 2021-04-21 NOTE — ED NOTES
[place] warmer applied to pt for core temp of 92 rectal.     Luis Manuel Larios RN  04/21/21 5822

## 2021-04-21 NOTE — Clinical Note
Patient Class: Inpatient [101]   REQUIRED: Diagnosis: Encephalopathy acute [374343]   Estimated Length of Stay: Estimated stay of more than 2 midnights   Admitting Provider: Vini Hawkins [[id number]]   Telemetry Bed Required?: Yes

## 2021-04-21 NOTE — ED NOTES
Jory, daughter is stepping out. Please call with room and updates.  Norm. Raul Gan 150, RN  04/21/21 3174

## 2021-04-21 NOTE — ED NOTES
Pt cleaned from old stool caked to his buttocks. Buttocks is red and open and bleeding in areas. Skin is blanchable. Pt has multiple wounds on his legs and feet. Pt has bruising to right lumbar region on his back. Pt states he takes care of himself at home with help from a home health nurse. Per [facility], they have went to his house three times in the past day to help with lift assists, however this time they reported the patient being \"off\" and talking to his catheter bag like a cell phone and trying to eat plastic. Pt is alert to self and place upon arrival. Pt is very rigid when trying to move him.       Myriam Haider RN  04/21/21 6731

## 2021-04-22 ENCOUNTER — APPOINTMENT (OUTPATIENT)
Dept: CT IMAGING | Age: 81
DRG: 871 | End: 2021-04-22
Payer: MEDICARE

## 2021-04-22 LAB
ALBUMIN SERPL-MCNC: 3.1 G/DL (ref 3.4–5)
ALBUMIN SERPL-MCNC: 3.1 G/DL (ref 3.4–5)
ALP BLD-CCNC: 291 U/L (ref 40–129)
ALT SERPL-CCNC: 27 U/L (ref 10–40)
AMMONIA: 26 UMOL/L (ref 16–60)
ANION GAP SERPL CALCULATED.3IONS-SCNC: 9 MMOL/L (ref 3–16)
AST SERPL-CCNC: 35 U/L (ref 15–37)
BASOPHILS ABSOLUTE: 0 K/UL (ref 0–0.2)
BASOPHILS RELATIVE PERCENT: 0.4 %
BILIRUB SERPL-MCNC: 0.3 MG/DL (ref 0–1)
BILIRUBIN DIRECT: <0.2 MG/DL (ref 0–0.3)
BILIRUBIN, INDIRECT: ABNORMAL MG/DL (ref 0–1)
BUN BLDV-MCNC: 39 MG/DL (ref 7–20)
C-REACTIVE PROTEIN: 12.7 MG/L (ref 0–5.1)
CALCIUM SERPL-MCNC: 8.8 MG/DL (ref 8.3–10.6)
CHLORIDE BLD-SCNC: 115 MMOL/L (ref 99–110)
CO2: 24 MMOL/L (ref 21–32)
CREAT SERPL-MCNC: 1.7 MG/DL (ref 0.8–1.3)
CREATININE URINE: 72.5 MG/DL (ref 39–259)
EKG ATRIAL RATE: 74 BPM
EKG DIAGNOSIS: NORMAL
EKG P AXIS: 59 DEGREES
EKG P-R INTERVAL: 226 MS
EKG Q-T INTERVAL: 388 MS
EKG QRS DURATION: 92 MS
EKG QTC CALCULATION (BAZETT): 430 MS
EKG R AXIS: 38 DEGREES
EKG T AXIS: 50 DEGREES
EKG VENTRICULAR RATE: 74 BPM
EOSINOPHILS ABSOLUTE: 0 K/UL (ref 0–0.6)
EOSINOPHILS RELATIVE PERCENT: 0.5 %
GFR AFRICAN AMERICAN: 47
GFR NON-AFRICAN AMERICAN: 39
GLUCOSE BLD-MCNC: 68 MG/DL (ref 70–99)
GLUCOSE BLD-MCNC: 75 MG/DL (ref 70–99)
HCT VFR BLD CALC: 29.9 % (ref 40.5–52.5)
HEMOGLOBIN: 9.7 G/DL (ref 13.5–17.5)
INR BLD: 1.22 (ref 0.86–1.14)
LYMPHOCYTES ABSOLUTE: 0.5 K/UL (ref 1–5.1)
LYMPHOCYTES RELATIVE PERCENT: 8 %
MAGNESIUM: 2.1 MG/DL (ref 1.8–2.4)
MCH RBC QN AUTO: 30.8 PG (ref 26–34)
MCHC RBC AUTO-ENTMCNC: 32.5 G/DL (ref 31–36)
MCV RBC AUTO: 94.9 FL (ref 80–100)
MONOCYTES ABSOLUTE: 0.5 K/UL (ref 0–1.3)
MONOCYTES RELATIVE PERCENT: 7.7 %
NEUTROPHILS ABSOLUTE: 5.8 K/UL (ref 1.7–7.7)
NEUTROPHILS RELATIVE PERCENT: 83.4 %
OSMOLALITY: 320 MOSM/KG (ref 278–305)
PDW BLD-RTO: 17.6 % (ref 12.4–15.4)
PERFORMED ON: NORMAL
PHOSPHORUS: 3.2 MG/DL (ref 2.5–4.9)
PLATELET # BLD: 102 K/UL (ref 135–450)
PMV BLD AUTO: 9.4 FL (ref 5–10.5)
POTASSIUM SERPL-SCNC: 4.9 MMOL/L (ref 3.5–5.1)
POTASSIUM SERPL-SCNC: 5.1 MMOL/L (ref 3.5–5.1)
POTASSIUM SERPL-SCNC: 5.1 MMOL/L (ref 3.5–5.1)
POTASSIUM SERPL-SCNC: 5.5 MMOL/L (ref 3.5–5.1)
POTASSIUM SERPL-SCNC: 5.5 MMOL/L (ref 3.5–5.1)
POTASSIUM SERPL-SCNC: 5.6 MMOL/L (ref 3.5–5.1)
PROTEIN PROTEIN: 166 MG/DL
PROTHROMBIN TIME: 14.2 SEC (ref 10–13.2)
RBC # BLD: 3.15 M/UL (ref 4.2–5.9)
SEDIMENTATION RATE, ERYTHROCYTE: 34 MM/HR (ref 0–20)
SODIUM BLD-SCNC: 148 MMOL/L (ref 136–145)
SODIUM URINE: 67 MMOL/L
T4 FREE: 0.9 NG/DL (ref 0.9–1.8)
TOTAL CK: 330 U/L (ref 39–308)
TOTAL PROTEIN: 6.6 G/DL (ref 6.4–8.2)
TROPONIN: 0.06 NG/ML
TROPONIN: 0.08 NG/ML
TROPONIN: 0.09 NG/ML
TROPONIN: 0.1 NG/ML
TSH REFLEX: 5.3 UIU/ML (ref 0.27–4.2)
UREA NITROGEN, UR: 705.4 MG/DL (ref 800–1666)
WBC # BLD: 6.9 K/UL (ref 4–11)
WHITE BLOOD CELLS (WBC), STOOL: NORMAL

## 2021-04-22 PROCEDURE — 80076 HEPATIC FUNCTION PANEL: CPT

## 2021-04-22 PROCEDURE — 2580000003 HC RX 258: Performed by: STUDENT IN AN ORGANIZED HEALTH CARE EDUCATION/TRAINING PROGRAM

## 2021-04-22 PROCEDURE — 83735 ASSAY OF MAGNESIUM: CPT

## 2021-04-22 PROCEDURE — 82550 ASSAY OF CK (CPK): CPT

## 2021-04-22 PROCEDURE — 99223 1ST HOSP IP/OBS HIGH 75: CPT | Performed by: INTERNAL MEDICINE

## 2021-04-22 PROCEDURE — 84439 ASSAY OF FREE THYROXINE: CPT

## 2021-04-22 PROCEDURE — 6370000000 HC RX 637 (ALT 250 FOR IP): Performed by: INTERNAL MEDICINE

## 2021-04-22 PROCEDURE — 74176 CT ABD & PELVIS W/O CONTRAST: CPT

## 2021-04-22 PROCEDURE — 87505 NFCT AGENT DETECTION GI: CPT

## 2021-04-22 PROCEDURE — 6360000002 HC RX W HCPCS: Performed by: STUDENT IN AN ORGANIZED HEALTH CARE EDUCATION/TRAINING PROGRAM

## 2021-04-22 PROCEDURE — 97162 PT EVAL MOD COMPLEX 30 MIN: CPT

## 2021-04-22 PROCEDURE — 97530 THERAPEUTIC ACTIVITIES: CPT

## 2021-04-22 PROCEDURE — 36415 COLL VENOUS BLD VENIPUNCTURE: CPT

## 2021-04-22 PROCEDURE — 83630 LACTOFERRIN FECAL (QUAL): CPT

## 2021-04-22 PROCEDURE — 80069 RENAL FUNCTION PANEL: CPT

## 2021-04-22 PROCEDURE — 93005 ELECTROCARDIOGRAM TRACING: CPT

## 2021-04-22 PROCEDURE — 84443 ASSAY THYROID STIM HORMONE: CPT

## 2021-04-22 PROCEDURE — 85652 RBC SED RATE AUTOMATED: CPT

## 2021-04-22 PROCEDURE — 6370000000 HC RX 637 (ALT 250 FOR IP): Performed by: STUDENT IN AN ORGANIZED HEALTH CARE EDUCATION/TRAINING PROGRAM

## 2021-04-22 PROCEDURE — 51702 INSERT TEMP BLADDER CATH: CPT

## 2021-04-22 PROCEDURE — 84132 ASSAY OF SERUM POTASSIUM: CPT

## 2021-04-22 PROCEDURE — 99221 1ST HOSP IP/OBS SF/LOW 40: CPT | Performed by: SURGERY

## 2021-04-22 PROCEDURE — 86140 C-REACTIVE PROTEIN: CPT

## 2021-04-22 PROCEDURE — 85025 COMPLETE CBC W/AUTO DIFF WBC: CPT

## 2021-04-22 PROCEDURE — 85610 PROTHROMBIN TIME: CPT

## 2021-04-22 PROCEDURE — 2500000003 HC RX 250 WO HCPCS: Performed by: STUDENT IN AN ORGANIZED HEALTH CARE EDUCATION/TRAINING PROGRAM

## 2021-04-22 PROCEDURE — 2060000000 HC ICU INTERMEDIATE R&B

## 2021-04-22 PROCEDURE — 93010 ELECTROCARDIOGRAM REPORT: CPT | Performed by: INTERNAL MEDICINE

## 2021-04-22 PROCEDURE — 82140 ASSAY OF AMMONIA: CPT

## 2021-04-22 PROCEDURE — 84484 ASSAY OF TROPONIN QUANT: CPT

## 2021-04-22 PROCEDURE — 97167 OT EVAL HIGH COMPLEX 60 MIN: CPT

## 2021-04-22 RX ORDER — FUROSEMIDE 10 MG/ML
40 INJECTION INTRAMUSCULAR; INTRAVENOUS ONCE
Status: COMPLETED | OUTPATIENT
Start: 2021-04-22 | End: 2021-04-22

## 2021-04-22 RX ORDER — DEXTROSE MONOHYDRATE 50 MG/ML
INJECTION, SOLUTION INTRAVENOUS CONTINUOUS
Status: DISCONTINUED | OUTPATIENT
Start: 2021-04-22 | End: 2021-04-23

## 2021-04-22 RX ORDER — CASTOR OIL AND BALSAM, PERU 788; 87 MG/G; MG/G
OINTMENT TOPICAL 2 TIMES DAILY
Status: DISCONTINUED | OUTPATIENT
Start: 2021-04-22 | End: 2021-04-24 | Stop reason: HOSPADM

## 2021-04-22 RX ADMIN — Medication: at 15:27

## 2021-04-22 RX ADMIN — HEPARIN SODIUM 5000 UNITS: 5000 INJECTION INTRAVENOUS; SUBCUTANEOUS at 01:20

## 2021-04-22 RX ADMIN — ASPIRIN 81 MG: 81 TABLET, CHEWABLE ORAL at 09:04

## 2021-04-22 RX ADMIN — HEPARIN SODIUM 5000 UNITS: 5000 INJECTION INTRAVENOUS; SUBCUTANEOUS at 05:50

## 2021-04-22 RX ADMIN — MEROPENEM 1000 MG: 1 INJECTION, POWDER, FOR SOLUTION INTRAVENOUS at 22:46

## 2021-04-22 RX ADMIN — HEPARIN SODIUM 5000 UNITS: 5000 INJECTION INTRAVENOUS; SUBCUTANEOUS at 22:46

## 2021-04-22 RX ADMIN — SODIUM POLYSTYRENE SULFONATE 15 G: 15 SUSPENSION ORAL; RECTAL at 03:02

## 2021-04-22 RX ADMIN — PANTOPRAZOLE SODIUM 40 MG: 40 TABLET, DELAYED RELEASE ORAL at 05:55

## 2021-04-22 RX ADMIN — FUROSEMIDE 40 MG: 10 INJECTION, SOLUTION INTRAMUSCULAR; INTRAVENOUS at 09:04

## 2021-04-22 RX ADMIN — SODIUM CHLORIDE 1500 MG: 900 INJECTION INTRAVENOUS at 04:55

## 2021-04-22 RX ADMIN — VANCOMYCIN HYDROCHLORIDE 1250 MG: 10 INJECTION, POWDER, LYOPHILIZED, FOR SOLUTION INTRAVENOUS at 22:46

## 2021-04-22 RX ADMIN — CYANOCOBALAMIN TAB 1000 MCG 1000 MCG: 1000 TAB at 09:04

## 2021-04-22 RX ADMIN — TAMSULOSIN HYDROCHLORIDE 0.8 MG: 0.4 CAPSULE ORAL at 09:04

## 2021-04-22 RX ADMIN — DEXTROSE MONOHYDRATE: 5 INJECTION, SOLUTION INTRAVENOUS at 20:17

## 2021-04-22 RX ADMIN — Medication 10 ML: at 09:05

## 2021-04-22 RX ADMIN — DEXTROSE MONOHYDRATE: 5 INJECTION, SOLUTION INTRAVENOUS at 10:04

## 2021-04-22 RX ADMIN — Medication: at 21:41

## 2021-04-22 RX ADMIN — MEROPENEM 1000 MG: 1 INJECTION, POWDER, FOR SOLUTION INTRAVENOUS at 12:16

## 2021-04-22 RX ADMIN — SODIUM BICARBONATE 50 MEQ: 84 INJECTION, SOLUTION INTRAVENOUS at 03:02

## 2021-04-22 RX ADMIN — HEPARIN SODIUM 5000 UNITS: 5000 INJECTION INTRAVENOUS; SUBCUTANEOUS at 15:25

## 2021-04-22 ASSESSMENT — ENCOUNTER SYMPTOMS
RECTAL PAIN: 0
DIARRHEA: 0
SHORTNESS OF BREATH: 0
NAUSEA: 1
CHEST TIGHTNESS: 0
ABDOMINAL PAIN: 1
WHEEZING: 0

## 2021-04-22 ASSESSMENT — PAIN SCALES - GENERAL
PAINLEVEL_OUTOF10: 0

## 2021-04-22 NOTE — PROGRESS NOTES
Pt received via stretcher from ED and oriented to room and call light use. 4 Eyes Admission Assessment     I agree as the admission nurse that 2 RN's have performed a thorough Head to Toe Skin Assessment on the patient. ALL assessment sites listed below have been assessed on admission. Areas assessed by both nurses:  [x]   Head, Face, and Ears   [x]   Shoulders, Back, and Chest - back bruise   [x]   Arms, Elbows, and Hands - scattered bruising  [x]   Coccyx, Sacrum, and Ischium - redness to groin, excoriated buttocks and scrotum   [x]   Legs, Feet, and Heels - R heel scabbed wound, DTI R shin, R ball of foot skin tear        Does the Patient have Skin Breakdown?   Yes a wound was noted on the Admission Assessment and an LDA was Initiated documentation include the Usha-wound, Wound Assessment, Measurements, Dressing Treatment, Drainage, and Color\",         Terrance Prevention initiated:  Yes   Wound Care Orders initiated:  Yes      [address]  nurse consulted for Pressure Injury (Stage 3,4, Unstageable, DTI, NWPT, and Complex wounds) or Terrance score 18 or lower:  Yes      Nurse 1 eSignature: Electronically signed by Cem Rojas RN on 4/22/21 at 2:34 AM EDT    **SHARE this note so that the co-signing nurse is able to place an eSignature**    Nurse 2 eSignature: Electronically signed by Paige Ortega RN on 4/22/21 at 5:48 AM EDT

## 2021-04-22 NOTE — PROGRESS NOTES
Physical Therapy  Facility/Department: [place] [address] PCU  Daily Treatment Note  NAME: Ainsley Cotton  : 1940  MRN: [id number]    Date of Service: 2021    Discharge Recommendations:Tushar Block scored a  on the AM-PAC short mobility form. Current research shows that an AM-PAC score of 17 or less is typically not associated with a discharge to the patient's home setting. Based on the patient's AM-PAC score and their current functional mobility deficits, it is recommended that the patient have 3-5 sessions per week of Physical Therapy at d/c to increase the patient's independence. Please see assessment section for further patient specific details. If patient discharges prior to next session this note will serve as a discharge summary. Please see below for the latest assessment towards goals. PT Equipment Recommendations  Equipment Needed: No(defer though has w/c)    Assessment   Body structures, Functions, Activity limitations: Decreased functional mobility ; Decreased strength  Assessment: The pt is a 78 y/o male who presents with acute encpehalopathy and recent AMS. Pt had been living alone and he states that he was w/c bound and only transferred to the toilet for a bowel movement ( had schneider). Pt profoundly weak and unable to sit EOB without signficant assist. Pt unable to transfer at this time. Pt will require raymundo for mobility and two person assist for bed mobility. Recommend further IP PT upon discharge to improve the patient's independence with mobility.   Treatment Diagnosis: impaired mobility secondary to AMS  Prognosis: Fair  Decision Making: Medium Complexity  PT Education: Goals;PT Role;Plan of Care;General Safety;Transfer Training;Home Exercise Program;Functional Mobility Training  Patient Education: pt will require reinforcement of education  Barriers to Learning: cognition  REQUIRES PT FOLLOW UP: Yes  Activity Tolerance  Activity Tolerance: Patient limited by fatigue;Patient limited Program, Functional Mobility Training, Safety Education & Training  Safety Devices  Type of devices: Bed alarm in place, Call light within reach, Left in bed, Nurse notified     Therapy Time   Individual Concurrent Group Co-treatment   Time In [address]         Time Out 1442         Minutes 28         Timed Code Treatment Minutes: 13 Minutes    Timed Code Treatment Minutes:  13 Minutes    Total Treatment Minutes:  28 minutes      Sushant Friday, PT

## 2021-04-22 NOTE — PROGRESS NOTES
Vancomycin has been discontinued. Pharmacy will sign off consult. If vancomycin dosing is resumed, please re-consult pharmacy. Please call with questions.   Jose Ambriz, PharmD, [facility]  Main pharmacy: [id number]  4/22/2021 8:11 AM

## 2021-04-22 NOTE — CONSULTS
Clinical Pharmacy Consult Note    Admit date: 4/21/2021    Subjective/Objective:  79year-old male with PMHx that includes BPH, CAD, A fib, HTN, and HLD who is admitted with altered mental status, hypothermia, and decubitus ulcers on buttocks and feet. Recent positive urine culture with Pseudomonas and enterococcus (12/2020). Pharmacy is consulted to dose Vancomycin per Dr. Kennedi Gonzalez    No current anti-infective agents    Recent Labs     04/21/21  1607 04/21/21  1716     --    K 7.0* 6.3*     --    CO2 22  --    BUN 41*  --    CREATININE 1.9*  --        Estimated Creatinine Clearance: 38 mL/min (A) (based on SCr of 1.9 mg/dL (H)). Recent Labs     04/21/21  1607   WBC 9.3   HGB 12.7*   HCT 39.2*   MCV 94.9   *       Height:  6' (182.9 cm)  Weight: 218 lb (98.9 kg)      Assessment/Plan:  1. Skin/soft tissue infection:  vancomycin - day #1  Vancomycin  · Will order 1.5 g X 1 dose, then start 1.25 g q 24 h. Provides ~ 13 mg/kg and is based on a half-life elimination of 19 hours. · Renal function will be monitored closely and dosing will be adjusted as appropriate. Please call with any questions. Thank you for consulting pharmacy!   [address] [place] [id number]  4/21/2021 9:48 PM

## 2021-04-22 NOTE — CONSULTS
Clinical Pharmacy Consult Note    Admit date: 4/21/2021    Subjective/Objective:  79year-old male with PMHx that includes BPH, CAD, A fib, HTN, and HLD who is admitted with altered mental status, hypothermia, and decubitus ulcers on buttocks and feet. Recent positive urine culture with Pseudomonas and enterococcus (12/2020). Pharmacy is consulted to dose Vancomycin per Dr. Marilee Cunningham    Pertinent Medications:    (4/21-4/22)   Vancomycin - day #2    1.5g IV x1    1.25g IV q24h (4/22-current)     Recent Labs     04/21/21  1607 04/21/21  1607 04/22/21  0053 04/22/21  0623     --   --  148*   K 7.0*   < > 5.6* 5.5*     --   --  115*   CO2 22  --   --  24   PHOS  --   --   --  3.2   BUN 41*  --   --  39*   CREATININE 1.9*  --   --  1.7*    < > = values in this interval not displayed. Estimated Creatinine Clearance: 42 mL/min (A) (based on SCr of 1.7 mg/dL (H)). Recent Labs     04/21/21  1607 04/22/21  0623   WBC 9.3 6.9   HGB 12.7* 9.7*   HCT 39.2* 29.9*   MCV 94.9 94.9   * 102*       Height:  6' (182.9 cm)  Weight: 209 lb 10.5 oz (95.1 kg)    Micro:   Stool (4/22): Sent   Blood x2 (4/21): Sent       Assessment/Plan:  1. Sepsis 2/2 SSTI infection :  vancomycin - day #2  Vancomycin  · Patient currently on 1.25g IV q24h, will continue current dosing. · Trough will be drawn when appropriate, goal trough ~15 mcg/mL   · Renal function will monitored and dose adjusted as appropriate. Please call with any questions. Thank you for consulting pharmacy!   Duarte Sands, PharmD, [address] [facility]: [phone]  [address] [place] [address] wireless: [phone]  4/22/2021 9:54 AM

## 2021-04-22 NOTE — CONSULTS
General Surgery   Resident Consult Note      Reason for Consult: Possible infected decubitus ulcer    History obtained from: Chart, nurse    History of Present Illness :  Catarino Wilson a 79 y. o. male with a history of BPH and indwelling catheter, recurrent UTIs, CAD s/p CABG, A. fib, HTN, HLD, CKD3, who presented from home with AMS. Pt lives alone and is often fully able to accomplish ADLs with help from a home health nurse. General surgery was consulted to evaluate for possible infected sacral decubitus ulcer. Per nursing when patient arrived his backside was covered in dried stool. However after cleaning the patient no decubitus ulcer was noted. Past Medical History:        Diagnosis Date    BPH (benign prostatic hyperplasia)     Carotid stenosis 12/2013    JESU 95-39% stenosis; LICA 70-09% stenosis    Cellulitis 12/2013    LLE    Chronic back pain     Diverticular disease     Erectile dysfunction     GERD (gastroesophageal reflux disease)     History of atrial fibrillation     Hypercholesteremia     Hypertension     Lower GI bleed     MDRO (multiple drug resistant organisms) resistance 10/26/2019    urine    Neuromuscular disorder (HCC)     spasticity    Renal insufficiency     Risk for falls     uses walker    Tinea corporis 12/2013    LLE    Vitamin B12 deficiency        Past Surgical History:           Procedure Laterality Date    BACK SURGERY  2006    lower lumbar    CORONARY ARTERY BYPASS GRAFT  12/13/2013    CABG x 5 (Dr Cheryl Romero), svg to diag, om1 and 3, distal rca, kelly to lad.      CYSTOSCOPY N/A 1/10/2019    CYSTOSCOPY performed by Art Bence, MD at [facility] [address] 5/1/2015    ORIF    SIGMOIDOSCOPY N/A 6/11/2020    SIGMOIDOSCOPY DIAGNOSTIC FLEXIBLE performed by Winston Chou MD at [address] 5/4/2020    EGD BIOPSY performed by Winston Chou MD at [facility] ENDOSCOPY       Allergies: Bactrim [sulfamethoxazole-trimethoprim]    Medications:   Home Meds  No current facility-administered medications on file prior to encounter. Current Outpatient Medications on File Prior to Encounter   Medication Sig Dispense Refill    gabapentin (NEURONTIN) 600 MG tablet Take 600 mg by mouth 2 times daily.  furosemide (LASIX) 40 MG tablet TAKE 1 TABLET TWICE DAILY 120 tablet 2    vitamin B-12 (CYANOCOBALAMIN) 1000 MCG tablet Take 1,000 mcg by mouth daily      aspirin 81 MG chewable tablet Take 1 tablet by mouth daily 30 tablet 3    pantoprazole (PROTONIX) 40 MG tablet Take 1 tablet by mouth every morning (before breakfast) 30 tablet 3    Melatonin 10 MG TABS Take 10 mg by mouth nightly as needed       atorvastatin (LIPITOR) 80 MG tablet Take 80 mg by mouth nightly.  zolpidem (AMBIEN) 10 MG tablet Take 10 mg by mouth nightly as needed for Sleep.        dicyclomine (BENTYL) 20 MG tablet Take 20 mg by mouth 3 times daily as needed      docusate sodium (COLACE) 100 MG capsule Take 1 capsule by mouth 2 times daily (Patient taking differently: Take 100 mg by mouth daily as needed ) 30 capsule 0    Balsam Peru-Castor Oil (VENELEX) OINT ointment Apply topically 2 times daily (Patient taking differently: Apply topically daily ) 2 Tube 0    tamsulosin (FLOMAX) 0.4 MG capsule Take 0.8 mg by mouth daily        Current Meds  vancomycin (VANCOCIN) 1,250 mg in dextrose 5 % 250 mL IVPB, Q24H  sodium chloride flush 0.9 % injection 5-40 mL, 2 times per day  sodium chloride flush 0.9 % injection 5-40 mL, PRN  0.9 % sodium chloride infusion, PRN  acetaminophen (TYLENOL) tablet 650 mg, Q6H PRN    Or  acetaminophen (TYLENOL) suppository 650 mg, Q6H PRN  0.9 % sodium chloride infusion, Continuous  aspirin chewable tablet 81 mg, Daily  atorvastatin (LIPITOR) tablet 80 mg, Nightly  pantoprazole (PROTONIX) tablet 40 mg, QAM AC  tamsulosin (FLOMAX) capsule 0.8 mg, Daily  vitamin B-12 (CYANOCOBALAMIN) tablet 1,000 mcg, RBCUA 21-50 04/21/2021    MUCUS 4+ 03/17/2020    YEAST Present 02/07/2020    BACTERIA 2+ 04/21/2021    CLARITYU Clear 04/21/2021    SPECGRAV 1.025 04/21/2021    LEUKOCYTESUR TRACE 04/21/2021    UROBILINOGEN 0.2 04/21/2021    BILIRUBINUR Negative 04/21/2021    BLOODU LARGE 04/21/2021    GLUCOSEU Negative 04/21/2021    AMORPHOUS 1+ 04/21/2021       Imaging  NA    Assessment/Plan:  79 y.o. male with multiple medical medical comorbidities who presented with altered mental status from home. General surgery was consulted to evaluate for possible infected sacral decubitus ulcer.    -After patient was cleaned from a dry down stool, no decubitus ulcer was noted.   -Agree with wound care consult to assist with taking care of macerated skin at the gluteal folds  -No surgical intervention indicated at this time  -Please call with questions      Neda Alpers, MD   4/22/2021 6:37 AM

## 2021-04-22 NOTE — PROGRESS NOTES
Office : [phone]     Fax :[phone]         Renal Progress Note  Subjective:   Admit Date: 4/21/2021     HPI Mr. Edmundo Kawasaki is a 80 yo M with PMHx BPH (chronic indwelling catheter), recurrent UTIs, CAD s/p CAB, Afib and CKD3 who presented to [facility] with AMS. Per chart review, he was found on the ground by his daughter and was unable to get up on his own. He was reportedly \"speaking to his catheter. \" He was admitted for sepsis 2/2 suspected soft tissue infection vs infectious diarrhea. Additionally he was was found to have ALIS on CKD with hyperkalemia. Nephrology was consulted for further recommendations. Interval History: Patient seen and examined. No overnight events. Denies any acute complaints at this time.        DIET DIET RENAL; Low Sodium (2 GM)  Medications:   Scheduled Meds:   vancomycin  1,250 mg Intravenous Q24H    sodium chloride flush  5-40 mL Intravenous 2 times per day    aspirin  81 mg Oral Daily    atorvastatin  80 mg Oral Nightly    pantoprazole  40 mg Oral QAM AC    tamsulosin  0.8 mg Oral Daily    vitamin B-12  1,000 mcg Oral Daily    heparin (porcine)  5,000 Units Subcutaneous 3 times per day     Continuous Infusions:   sodium chloride      sodium chloride 75 mL/hr at 04/21/21 2241       Labs:  CBC:   Recent Labs     04/21/21  1607 04/22/21  0623   WBC 9.3 6.9   HGB 12.7* 9.7*   * 102*     BMP:    Recent Labs     04/21/21  1607 04/21/21  1607 04/21/21  2139 04/22/21  0053 04/22/21  0623     --   --   --  148*   K 7.0*   < > 5.2* 5.6* 5.5*     --   --   --  115*   CO2 22  --   --   --  24   BUN 41*  --   --   --  39* CREATININE 1.9*  --   --   --  1.7*   GLUCOSE 87  --   --   --  68*    < > = values in this interval not displayed. Ca/Mg/Phos:   Recent Labs     04/21/21  1607 04/21/21  2139 04/22/21  0623   CALCIUM 9.8  --  8.8   MG  --  2.20 2.10   PHOS  --   --  3.2     Hepatic: No results for input(s): AST, ALT, ALB, BILITOT, ALKPHOS in the last 72 hours. Troponin:   Recent Labs     04/21/21  1931 04/22/21  0053 04/22/21  0623   TROPONINI 0.04* 0.06* 0.08*     BNP: No results for input(s): BNP in the last 72 hours. Lipids: No results for input(s): CHOL, TRIG, HDL, LDLCALC, LABVLDL in the last 72 hours. ABGs: No results for input(s): PHART, PO2ART, OQY9UFH in the last 72 hours. INR: No results for input(s): INR in the last 72 hours. UA:  Recent Labs     04/21/21  1607   COLORU Yellow   CLARITYU Clear   GLUCOSEU Negative   BILIRUBINUR Negative   KETUA 15*   SPECGRAV 1.025   BLOODU LARGE*   PHUR 6.0   PROTEINU >=300*   UROBILINOGEN 0.2   NITRU Negative   LEUKOCYTESUR TRACE*   LABMICR YES   URINETYPE NotGiven      Urine Microscopic:   Recent Labs     04/21/21  1607   BACTERIA 2+*   WBCUA 3-5   RBCUA 21-50*   EPIU 0-1     Urine Culture: No results for input(s): LABURIN in the last 72 hours.   Urine Chemistry:   Recent Labs     04/21/21  2345   PROTEINUR 166.00*   NAUR 67       Objective:   Vitals: BP (!) 126/57   Pulse 68   Temp 97.5 °F (36.4 °C) (Axillary)   Resp 16   Ht 6' (1.829 m)   Wt 209 lb 10.5 oz (95.1 kg)   SpO2 94%   BMI 28.43 kg/m²    Wt Readings from Last 3 Encounters:   04/22/21 209 lb 10.5 oz (95.1 kg)   02/15/21 218 lb 4.1 oz (99 kg)   12/17/20 198 lb (89.8 kg)      24HR INTAKE/OUTPUT:      Intake/Output Summary (Last 24 hours) at 4/22/2021 0932  Last data filed at 4/22/2021 0458  Gross per 24 hour   Intake 2508 ml   Output 550 ml   Net 1958 ml     Constitutional:  Alert, resting comfortably no apparent distress  NECK: supple, no JVD  Mucus membranes: Appear dry   Cardiovascular:  S1, S2 without

## 2021-04-22 NOTE — PLAN OF CARE
Problem: Falls - Risk of:  Goal: Will remain free from falls  Description: Will remain free from falls  Outcome: Ongoing  Note: Safety and fall precautions in place, ie bed alarm on, call light within reach at all times and needs anticipated. Frequent roundings performed. Will continue to monitor. Goal: Absence of physical injury  Description: Absence of physical injury  Outcome: Ongoing     Problem: Skin Integrity:  Goal: Will show no infection signs and symptoms  Description: Will show no infection signs and symptoms  Outcome: Ongoing  Goal: Absence of new skin breakdown  Description: Absence of new skin breakdown  Outcome: Ongoing  Note: Provided good skin care and observing Q2 turns. Skin protective barrier applied PRN. Wound care consult and special mattress ordered. Will continue to monitor.

## 2021-04-22 NOTE — PROGRESS NOTES
Occupational Therapy   Occupational Therapy Initial Assessment and Tx  Date: 2021   Patient Name: Bao Frazier  MRN: [id number]     : 1940    Date of Service: 2021    Discharge Recommendations: Bao Frazier scored a 9/24 on the AM-PAC ADL Inpatient form. Current research shows that an AM-PAC score of 17 or less is typically not associated with a discharge to the patient's home setting. Based on the patient's AM-PAC score and their current ADL deficits, it is recommended that the patient have 3-5 sessions per week of Occupational Therapy at d/c to increase the patient's independence. Please see assessment section for further patient specific details. If patient discharges prior to next session this note will serve as a discharge summary. Please see below for the latest assessment towards goals. OT Equipment Recommendations  Other: defer    Assessment   Performance deficits / Impairments: Decreased functional mobility ; Decreased ADL status; Decreased ROM; Decreased strength;Decreased cognition;Decreased endurance;Decreased balance;Decreased posture  Assessment: Unable to complete PLOF social fxl history. Pt poor historian, per pt and chart [facility] Pt found down at home, AMS, speaking to catheter bad like a telephone. Per nursing staff pt is w/c bound at baseline. Pt stating he was able to pivot transfer to toilet from w/c. Pt with significantly decreased UE ROM and strength. Pt requiring MaxA- Dep/Total for all bed mobility this date, supine to sit, sitting balance. 2 person assist required. Pt sitting EOB this date, attempt feeding, MaxA feeding, MaxA wipe face with cloth. Pt would benefit from cont skilled OT services while in acute care and at d/c. Will follow.   Treatment Diagnosis: impaired mobility, transfers, ADL, cognition  Prognosis: Fair  Decision Making: High Complexity  OT Education: OT Role;Plan of Care  Patient Education: cont to decubitus ulcer. Per nursing when patient arrived his backside was covered in dried stool. However after cleaning the patient no decubitus ulcer was noted. For the past few days he has been less interactive than usual. Yesterday, home health care called EMS to his house multiple time to help him with mobility. Today, daughter found him on the floor unable to get up and speaking to his urinary catheter so called 911. She reports concern about pts safety alone at home. Upon examination, pt reports he's had worsening diarrhea which began 10days ago and is associated with abd bloating. He has about 3-4 watery brown stools daily. He believed his in the hospital for the diarrhea and a possible UTI. He denies every feeling confused but reports feeling week over the past few days. Referring Practitioner: Jenaro Grimes MD  Diagnosis: encephalopathy/AMS  Subjective  Subjective: Pt in bed upon arrival, agreeable to therapy session, lethargic, perseverating throughout conversation    Social/Functional History  Social/Functional History  Lives With: Alone  Type of Home: Apartment  Home Layout: One level  Home Access: Level entry  Bathroom Shower/Tub: (sponge bathes)  Home Equipment: [address]: Needs assistance([facility] aide)  Additional Comments: Per nurse and pt stating w/c bound at baseline. Pt stating can stand pivot to toilet. Sponge baths. Pt stating he can dress himself with AE. Pt reporting home health care 1x week, stating no OT/PT. Pt with AMS poor historian, questional responses.        Objective        Orientation  Overall Orientation Status: Impaired  Orientation Level: Oriented to person;Oriented to place(able to state month and year)     Balance  Sitting Balance: Dependent/Total(MaxA x1-2 to Evelyn, sitting EOB ~7 min)  Standing Balance: Unable to assess(comment)(unsafe to attempt OOB at this time)  ADL  Feeding: Maximum assistance  Grooming: Maximum assistance(wipe face with cloth)  LE Dressing: Dependent/Total  Toileting: Dependent/Total        Bed mobility  Rolling to Left: Dependent/Total;2 Person assistance  Rolling to Right: Dependent/Total;2 Person assistance  Supine to Sit: Dependent/Total;2 Person assistance  Sit to Supine: Dependent/Total;2 Person assistance  Scootin Person assistance;Dependent/Total        Cognition  Overall Cognitive Status: Exceptions  Arousal/Alertness: Delayed responses to stimuli  Following Commands: Follows one step commands with increased time; Follows one step commands with repetition; Inconsistently follows commands  Attention Span: Difficulty attending to directions  Memory: Decreased recall of biographical Information;Decreased recall of recent events;Decreased short term memory  Problem Solving: Decreased awareness of errors  Insights: Decreased awareness of deficits  Initiation: Requires cues for some  Sequencing: Requires cues for some                 LUE PROM (degrees)  LUE PROM: Exceptions  LUE General PROM: Pt significantly decreased ROM BUE, ~30 degrees shoulder AROM, ~60 degrees PROM  LUE Strength  Gross LUE Strength: Exceptions to [facility]  LUE Strength Comment: BUE significant weakness, decreased ROM  RUE Strength  Gross RUE Strength: Exceptions to [facility]  Times per week: 2-5      AM-PAC Score        AM-[facility] Inpatient Daily Activity Raw Score: 9 (21)  AM-PAC Inpatient ADL T-Scale Score : 25.33 (21)  ADL Inpatient CMS 0-100% Score: 79.59 (21)  ADL Inpatient CMS G-Code Modifier : CL (21)    Goals  Short term goals  Time Frame for Short term goals: d/c  Short term goal 1: pt will be ModA x2 with supine to sit  Short term goal 2: pt will be Evelyn with sitting balance EOB ~5 min  Short term goal 3: pt will be Evelyn with simple grooming task seated EOB       Therapy Time   Individual Concurrent Group Co-treatment   Time In 1414         Time Out 1445         Minutes 31           Timed Code Treatment Minutes:   21    Total Treatment Minutes:  [address] [facility], MOT, OTR/L

## 2021-04-22 NOTE — PLAN OF CARE
Problem: Falls - Risk of:  Goal: Will remain free from falls  Description: Will remain free from falls  4/22/2021 1829 by Sue Wong RN  Outcome: Met This Shift    Outcome: Ongoing  Note: Safety and fall precautions in place ie bed alarm on, call light within reach at all times and needs anticipated. Will continue to monitor. Goal: Absence of physical injury  Description: Absence of physical injury  4/22/2021 1829 by Sue Wong RN  Outcome: Met This Shift    Outcome: Ongoing     Problem: Skin Integrity:  Goal: Absence of new skin breakdown  Description: Absence of new skin breakdown  4/22/2021 1829 by Sue Wong RN  Outcome: Met This Shift  4/22/2021 1829 by Sue Wong RN  Outcome: Ongoing    Outcome: Ongoing  Note: Provided good skin care and observing Q2 turns. Skin protective barrier applied PRN. Wound care consult and special mattress ordered. Will continue to monitor.       Problem: Pain:  Goal: Pain level will decrease  Description: Pain level will decrease  Outcome: Met This Shift  Goal: Control of acute pain  Description: Control of acute pain  Outcome: Met This Shift independent

## 2021-04-22 NOTE — PROGRESS NOTES
Progress Note    Admit Date: 4/21/2021  Day: 1  Diet: DIET RENAL; Low Sodium (2 GM)    CC: Altered mental status    Interval history: No acute events overnight. Tele showing first degree block otherwise unremarkable. K this AM elevated 5.5, EKG without T wave abnormalities. Patient denies chest pain, shortness of breath. Denies diarrhea. States he is having formed bowel movements. Medications:     Scheduled Meds:   vancomycin  1,250 mg Intravenous Q24H    sodium chloride flush  5-40 mL Intravenous 2 times per day    aspirin  81 mg Oral Daily    atorvastatin  80 mg Oral Nightly    pantoprazole  40 mg Oral QAM AC    tamsulosin  0.8 mg Oral Daily    vitamin B-12  1,000 mcg Oral Daily    heparin (porcine)  5,000 Units Subcutaneous 3 times per day     Continuous Infusions:   sodium chloride      sodium chloride 75 mL/hr at 04/21/21 2241     PRN Meds:sodium chloride flush, sodium chloride, acetaminophen **OR** acetaminophen    Objective:   Vitals:   T-max:  Patient Vitals for the past 8 hrs:   BP Temp Temp src Pulse Resp SpO2 Weight   04/22/21 0814 (!) 126/57 97.5 °F (36.4 °C) Axillary 68 16 94 % --   04/22/21 0458 (!) 101/53 98.5 °F (36.9 °C) Oral 77 14 95 % 209 lb 10.5 oz (95.1 kg)       Intake/Output Summary (Last 24 hours) at 4/22/2021 0928  Last data filed at 4/22/2021 0458  Gross per 24 hour   Intake 2508 ml   Output 550 ml   Net 1958 ml       Review of Systems   Constitutional: Positive for fatigue. Negative for fever. Respiratory: Negative for chest tightness, shortness of breath and wheezing. Cardiovascular: Negative for chest pain and palpitations. Gastrointestinal: Positive for abdominal pain and nausea. Negative for diarrhea and rectal pain. Neurological: Negative for dizziness. Physical Exam  Vitals signs reviewed. HENT:      Head: Normocephalic. Mouth/Throat:      Mouth: Mucous membranes are dry. Cardiovascular:      Rate and Rhythm: Normal rate and regular rhythm. Heart sounds: Murmur present. Comments: Pansystolic   Pulmonary:      Effort: Pulmonary effort is normal.      Breath sounds: No wheezing. Abdominal:      General: Abdomen is flat. There is no distension. Tenderness: There is abdominal tenderness. There is no guarding. Comments: Liver feels hard on exam   Musculoskeletal:         General: Swelling present. Right lower leg: Edema present. Left lower leg: Edema present. Comments: Bl LE erythema and 2+- edema   Skin:     Findings: Erythema present. Neurological:      Mental Status: Mental status is at baseline. LABS:    CBC:   Recent Labs     04/21/21 1607 04/22/21 0623   WBC 9.3 6.9   HGB 12.7* 9.7*   HCT 39.2* 29.9*   * 102*   MCV 94.9 94.9     Renal:    Recent Labs     04/21/21 1607 04/21/21 1607 04/21/21 2139 04/22/21 0053 04/22/21  0623     --   --   --  148*   K 7.0*   < > 5.2* 5.6* 5.5*     --   --   --  115*   CO2 22  --   --   --  24   BUN 41*  --   --   --  39*   CREATININE 1.9*  --   --   --  1.7*   GLUCOSE 87  --   --   --  68*   CALCIUM 9.8  --   --   --  8.8   MG  --   --  2.20  --  2.10   PHOS  --   --   --   --  3.2   ANIONGAP 10  --   --   --  9    < > = values in this interval not displayed. Hepatic:   Recent Labs     04/22/21 0623   LABALBU 3.1*     Troponin:   Recent Labs     04/21/21 1931 04/22/21 0053 04/22/21 0623   TROPONINI 0.04* 0.06* 0.08*     BNP: No results for input(s): BNP in the last 72 hours. Lipids: No results for input(s): CHOL, HDL in the last 72 hours. Invalid input(s): LDLCALCU, TRIGLYCERIDE  ABGs:  No results for input(s): PHART, QSY4PSX, PO2ART, DQI9SGC, BEART, THGBART, P8QHTSDK, RMW0GVK in the last 72 hours. INR: No results for input(s): INR in the last 72 hours. Lactate: No results for input(s): LACTATE in the last 72 hours.   Cultures:  -----------------------------------------------------------------  RAD:   CT HEAD WO CONTRAST   Final Result 1. No CT evidence of an acute intracranial hemorrhage, mass effect or midline structural shift. 2. Incidental mild bilateral basal ganglia calcifications. Limited exam due to poor patient positioning in the scanner. XR CHEST PORTABLE   Final Result      Cardiac enlargement and mild vascular congestion                      Assessment/Plan:     Acute metabolic encephalopathy  2/2 sepsis vs. Hypothermia. Patient presented with temperature of 92F. Patient's mental status back to baseline following sepsis fluids and rewarming.  - Continue to monitor  - TSH pending     SIRS+ 2/2 bloodstream infection vs. UTI  SIRS: Tachycardia and Hypothermia. Unikely 2/2 to soft tissue infection. After patient was cleaned no decubitus ulcer was noted. Wound care will assist with macerated skin at the gluteal folds  - S/p sepsis bolus in ED  - NS at 75ml/hr  - Blood culture x2 pending  - Wound culture pending  - Vancomycin (4/21-)  - Merrem (4/22-)   - History of enterococcus faecalis sensitive to vancomycin   - History of psedomonas sensitive to meropenem  - Gen Surg consult   - Wound care consult     ALIS on CKD 3  History of of CKD 3. Cr baseline 1.0-1.3. On admission 1.9. Likely prerenal. Pt on lasix at home w/ report of diarrhea for 10days. K elevated to 7.0 on presentation.  - Cr 1.7 this AM  - IVFs as above  - Nephrology consult     Hyperkalemia  7.0 on presentation. Repeat 6.3. Likely 2/2 to ALIS. S/p temporizing measures. - Trend K Q4hrs  - EKG this AM stable  - K this AM 5.5      Elevated troponin  Trops seem to be slightly elevated to 0.03 chronically. Likely 2/2 to demand ischemia in the setting to sepsis and decrease clearance in the setting of ALIS. - Trend trops Q6hr   - 0.08 this AM, EKG without ischemic changes  - Tele       BPH   w/ chronic indwelling schneider. Patient does not seem to follow up with urology outpatient.   - Continue home Flomax     CAD  S/p CABG  - Continue home ASA and Statin     Paroxysmal atrial fibrillation  Was on full dose aspirin but  decrease to ASA 81mg due to suspected GI bleed. NSR on Tele in the ED.  - Cont ASA 81      Debility/physical deconditioning  Worsening weakness and inability to perform ADLs  - PT/OT consult  - CM consult for placement    Code Status: full  FEN: diet renal  PPX: subq heparin  DISPO: ip    Osmin Shahid DO, PGY-1  04/22/21  9:28 AM    This patient has been staffed and discussed with Valencia Khanna MD.     I have personally seen and evaluated this patient. I discussed findings and management with the resident, on 04/22/21  and agree as documented in this note.     Rodney Newsome [facility]  Attending Physician

## 2021-04-22 NOTE — H&P
[facility]  PGY 2  History & Physical      CC AMS    History Obtained From:  Patient and Chart Review    HISTORY OF PRESENT ILLNESS:  Gigi Decker is a 79 y.o. male with a history of BPH and indwelling catheter, recurrent UTIs, CAD s/p CABG, A. fib, HTN, HLD, CKD3, who presented from home with AMS. Pt lives alone and is often fully able to accomplish ADLs with help from a home health nurse. For the past few days he has been less interactive than usual. Yesterday, home health care called EMS to his house multiple time to help him with mobility. Today, daughter found him on the floor unable to get up and speaking to his urinary catheter so called 911. She reports concern about pts safety alone at home. Upon examination, pt reports he's had worsening diarrhea which began 10days ago and is associated with abd bloating. He has about 3-4 watery brown stools daily. He believed his in the hospital for the diarrhea and a possible UTI. He denies every feeling confused but reports feeling week over the past few days. He was recently prescribed Lomotil which he states helped a bit. His last use of Abx was about 2 months ago. He is currently AAOx4. He reports a headache but denies F/C, N/V, CP, SOB, cough and abd pain. Pt had a formed bowel movement during exam.     In the ED, VS sig for temp of 92F. Heating blanket was immediately placed. BP was high at 170/94 and Pt tachycardic to 116. He was covered in dried stool. Labs were sig for K 7.0 , repeat 6.3, BUN 41, Cr 1.9, Lactate WNL. UA showed 15 ketones, large blood >300 proteins and trace LE w/ 2+ bacteria and normal WBCs. VBG pH 7.249. EKG was NSR with no acute changes. CXR showed Cardiac enlargement and mild vascular congestion. CT head No CT evidence of an acute intracranial hemorrhage, mass effect or midline structural shift, Incidental mild bilateral basal ganglia calcifications. Pt was given sepsis bolus and temporizing measures for hyperkalemia.        Past Medical History:        Diagnosis Date    BPH (benign prostatic hyperplasia)     Carotid stenosis 12/2013    JESU 36-01% stenosis; LICA 58-06% stenosis    Cellulitis 12/2013    LLE    Chronic back pain     Diverticular disease     Erectile dysfunction     GERD (gastroesophageal reflux disease)     History of atrial fibrillation     Hypercholesteremia     Hypertension     Lower GI bleed     MDRO (multiple drug resistant organisms) resistance 10/26/2019    urine    Neuromuscular disorder (HCC)     spasticity    Renal insufficiency     Risk for falls     uses walker    Tinea corporis 12/2013    LLE    Vitamin B12 deficiency    ·     Past Surgical History:        Procedure Laterality Date    BACK SURGERY  2006    lower lumbar    CORONARY ARTERY BYPASS GRAFT  12/13/2013    CABG x 5 (Dr Med Underwood), svg to diag, om1 and 3, distal rca, kelly to lad.  CYSTOSCOPY N/A 1/10/2019    CYSTOSCOPY performed by Megan Lebron MD at [facility] [address] [place] [address] 5/1/2015    ORIF    SIGMOIDOSCOPY N/A 6/11/2020    SIGMOIDOSCOPY DIAGNOSTIC FLEXIBLE performed by Mina Viera MD at [address] [place] [address] 5/4/2020    EGD BIOPSY performed by Mina Viera MD at [place] [address]   ·     Medications Priorto Admission:    · Medications Prior to Admission: gabapentin (NEURONTIN) 600 MG tablet, Take 600 mg by mouth 2 times daily.   · dicyclomine (BENTYL) 20 MG tablet, Take 20 mg by mouth 3 times daily as needed  · furosemide (LASIX) 40 MG tablet, TAKE 1 TABLET TWICE DAILY  · docusate sodium (COLACE) 100 MG capsule, Take 1 capsule by mouth 2 times daily (Patient taking differently: Take 100 mg by mouth daily as needed )  · vitamin B-12 (CYANOCOBALAMIN) 1000 MCG tablet, Take 1,000 mcg by mouth daily  · aspirin 81 MG chewable tablet, Take 1 tablet by mouth daily  · pantoprazole (PROTONIX) 40 MG tablet, Take 1 tablet by mouth every morning (before breakfast)  · Balsam Peru-Castor Oil (VENELEX) OINT ointment, Apply topically 2 times daily (Patient taking differently: Apply topically daily )  · tamsulosin (FLOMAX) 0.4 MG capsule, Take 0.8 mg by mouth daily   · Melatonin 10 MG TABS, Take 10 mg by mouth nightly as needed   · atorvastatin (LIPITOR) 80 MG tablet, Take 80 mg by mouth nightly. · zolpidem (AMBIEN) 10 MG tablet, Take 10 mg by mouth nightly as needed for Sleep. Allergies:  Bactrim [sulfamethoxazole-trimethoprim]    Social History:   · TOBACCO:   reports that he quit smoking about 51 years ago. He has never used smokeless tobacco.  · ETOH:   reports no history of alcohol use. · Patient currently lives alone  ·   Family History:       Problem Relation Age of Onset    Heart Disease Father    ·     Review of Systems    ROS: A 10 point review of systems was conducted, significant findings as noted in HPI. Physical Exam  Constitutional:       General: He is sleeping. He is not in acute distress. Appearance: Normal appearance. He is ill-appearing. HENT:      Head: Normocephalic and atraumatic. Mouth/Throat:      Mouth: Mucous membranes are dry. Eyes:      General: Vision grossly intact. Conjunctiva/sclera: Conjunctivae normal.   Neck:      Musculoskeletal: Full passive range of motion without pain, normal range of motion and neck supple. Cardiovascular:      Rate and Rhythm: Normal rate and regular rhythm. Pulses: Normal pulses. Heart sounds: S1 normal and S2 normal. Murmur (Pansystolic heard best in pulmonic area) present. No friction rub. No gallop. Pulmonary:      Effort: Pulmonary effort is normal. No respiratory distress. Breath sounds: Normal breath sounds and air entry. No wheezing or rales. Abdominal:      General: Bowel sounds are normal. There is no distension. Palpations: Abdomen is soft. Tenderness: There is no abdominal tenderness. Musculoskeletal: Normal range of motion.       Right lower leg: Edema present. Left lower leg: Edema present. Comments: 2+ b/l LE edema   Skin:     General: Skin is warm. Capillary Refill: Capillary refill takes less than 2 seconds. Comments: Stage II decubitus on buttocks R>L. Appears erythematous and may be infected. Erythema on portions of the scrotum   Neurological:      General: No focal deficit present. Mental Status: He is oriented to person, place, and time and easily aroused. Mental status is at baseline. Psychiatric:         Mood and Affect: Mood normal.         Speech: Speech normal.         Judgment: Judgment normal.           Vitals:    04/21/21 2123   BP: (!) 150/79   Pulse: 72   Resp: 15   Temp: 97.7 °F (36.5 °C)   SpO2: 94%       DATA:    Labs:  CBC:   Recent Labs     04/21/21  1607   WBC 9.3   HGB 12.7*   HCT 39.2*   *       BMP:   Recent Labs     04/21/21  1607 04/21/21  1716 04/21/21  2139     --   --    K 7.0* 6.3* 5.2*     --   --    CO2 22  --   --    BUN 41*  --   --    CREATININE 1.9*  --   --    GLUCOSE 87  --   --      LFT's: No results for input(s): AST, ALT, ALB, BILITOT, ALKPHOS in the last 72 hours. Troponin:   Recent Labs     04/21/21  1931   TROPONINI 0.04*     BNP:No results for input(s): BNP in the last 72 hours. ABGs: No results for input(s): PHART, BBI4ZXV, PO2ART in the last 72 hours. INR: No results for input(s): INR in the last 72 hours. U/A:  Recent Labs     04/21/21  1607   COLORU Yellow   PHUR 6.0   WBCUA 3-5   RBCUA 21-50*   BACTERIA 2+*   CLARITYU Clear   SPECGRAV 1.025   LEUKOCYTESUR TRACE*   UROBILINOGEN 0.2   BILIRUBINUR Negative   BLOODU LARGE*   GLUCOSEU Negative   AMORPHOUS 1+       CT HEAD WO CONTRAST   Final Result   1. No CT evidence of an acute intracranial hemorrhage, mass effect or midline structural shift. 2. Incidental mild bilateral basal ganglia calcifications. Limited exam due to poor patient positioning in the scanner.       XR CHEST PORTABLE   Final Result      Cardiac enlargement and mild vascular congestion                        ASSESSMENT AND PLAN:  Jacoby Montgomery is a 79 y.o. male, who was presents w/ AMS and is being admitted for management of the follow    Acute Metabolic Encephalopathy  Likely 2/2 to sepsis vrs hypothermia. Resolved after warming and sepsis bolus. - Continue to monitor  - Check TSH    Sepsis  SIRS: Tachycardia and Hypothermia. Likely 2/2 to soft tissue infection. Pt has stage II - III decubitus ulcer on his buttocks which appears infected. He was covered in stool on arrival which could have seeded ulcer. Pt ill appearing  - S/p sepsis bolus in ED  - NS at 75ml/hr  - Blood Cx x2 sent  - Wound cx  - Start Vanc and Unasyn  - Gen Surg Consult - Appreciate recs  - Wound care consult. ALIS on CKD  Ho of CKD 3. Cr baseline 1.0-1.3. Now 1.9. Likely prerenal. Pt on lasix at home w/ report of diarrhea for 10days. K elevated to 7.0 on presentation.  - ALIS studies  - S/p Sepsis bolus  - IVFs as above  - Nephrology Consult - Appreciate Recs     Hyperkalemia  7.0 on presentation. Repeat 6.3. Likely 2/2 to ALIS. S/p temporizing measures. - Trend K Q4hrs 7.0 -> 6.3 -> 5.2  - S/p 1 dose of Kayexalate     Diarrhea  Acute. Etiology unclear. Likely Viral gastroenteritis. Started spontaneously. Last dose of Abx 2 months ago. Appears to be resolved. Had a solid BM on arrival to floors. Low suspicious for cdiff  - Continue to monitor   - BMP and Mg daily to monitor electrolytes    Troponemia   Trops always slightly elevated to 0.03. Today 0.04. Likely 2/2 to demand ischemia in the setting to sepsis and decrease clearance in the setting of ALIS. No acute signs of ischemia on EKG. No chest pain or SOB reported. - Trend trops Q6hr  - Tele monitoring     BPH  w/ chronic indwelling schneider. - Pt was supposed to f/u w/ urology outpt but that does not seem to have occurred  - No acute issues now.  Consider Urology consult in the AM  - Continue home Flomax     CAD  S/p CABG  - Start home ASA and Statin     pAF  Was on full dose aspirin but  decrease to ASA 81mg due to suspected GI bleed. NSR on Tele in the ED.  - Cont baby ASA      Debility  Worsening weakness and inability to perform ADLs  - CK level  - PT/OT consult  - CM consult for placement      Code Status: Full Code  FEN: DIET RENAL; Low Sodium (2 GM)   PPX:       [x]Protonix      [x]SQH   DISPO: [x] GMF      This patient has been staffed and discussed with Harjinder Escobedo DO.   ----------------------------  Teri Reeder MD PGY-2  4/21/2021,  10:18 PM    I saw the patient independently from the resident . I discussed the care with the resident. I personally reviewed the HPI, PH, FH, SH, ROS and medications. I repeated pertinent portions of the examination and reviewed the relevant imaging and laboratory data. I agree with the findings, assessment and plan as documented. addition to: Patient 78-year-old male admitted for acute metabolic encephalopathy unclear etiology at this time however patient improved after warming. Patient did meet SIRS with tachycardia and hyponatremia, reviewed chest x-ray, UA, blood cultures are pending. Patient did have acute diarrhea but have resolved since then, stool studies rule out ordered. Patient also has an ALIS on CKD likely explained with low oral intake in the setting of diarrhea. Patient did have ulcers decub stage III with plan as above.

## 2021-04-22 NOTE — PROGRESS NOTES
Merrem 1g IV q8h ordered for patient. This medication is renally eliminated. Will change to Merrem 1g IV q12h per renal dose adjustment policy. Estimated Creatinine Clearance: 42 mL/min (A) (based on SCr of 1.7 mg/dL (H)). Pharmacy will continue to monitor renal function and adjust dose as necessary. Please call with any questions.   Nicholas Ashley, PharmD, [facility]  Main pharmacy: [id number]  4/22/2021 10:44 AM

## 2021-04-22 NOTE — CARE COORDINATION
Case Management Assessment           Initial Evaluation                Date / Time of Evaluation: 4/22/2021 3:44 PM                 Assessment Completed by: Samantha Ortega    Patient Name: Makeda Rosales     YOB: 1940  Diagnosis: Encephalopathy acute [G93.40]     Date / Time: 4/21/2021  3:18 PM    Patient Admission Status: Inpatient    If patient is discharged prior to next notation, then this note serves as note for discharge by case management. Current PCP: Tim Matt Patient: No    Chart Reviewed: Yes  Patient/ Family Interviewed: No    Initial assessment completed at bedside with: left voicemail for daughter    Hospitalization in the last 30 days: No    Emergency Contacts:  Extended Emergency Contact Information  Primary Emergency Contact: [place] [address] Phone: [phone]  Work Phone: [phone]  Mobile Phone: [phone]  Relation: Child  Secondary Emergency Contact: Dane Joel  Address: [address]  Home Phone: [phone]  Relation: Other    Advance Directives:   Code Status: Full Code        Financial  Payor: Abel Branham / Plan: Nelli  PLUS HMO / Product Type: *No Product type* /     Pre-cert required for SNF: Yes    [facility] - [place] [place] [facility] [phone] - F [phone]  [address] [place] [id number]  Phone: [phone] Fax: [phone]    [place] [address] #[id number] - [address] [phone] [facility] [address] [address]  Phone: [phone] Fax: [phone]      Potential assistance Purchasing Medications: Potential Assistance Purchasing Medications: No  Does Patient want to participate in local refill/ meds to beds program?: No    Meds To Beds General Rules:  1. Can ONLY be done Monday- Friday between 8:30am-5pm  2. Prescription(s) must be in pharmacy by 3pm to be filled same day  3. Copy of patient's insurance/ Francis Richardson and his family were provided with a choice of provider and agrees with the discharge plan Yes    Freedom of choice list was provided with basic dialogue that supports the patient's individualized plan of care/goals and shares the quality data associated with the providers.  Yes    Care Transition patient: No    Bryson Campos RN  [facility]  Case Management Department  Ph: [phone]   Fax: [phone]

## 2021-04-22 NOTE — PROGRESS NOTES
otherwise denies discmfort to affected areas. Plan:   Plan of Care: Wound 04/22/21 Heel Right Unstageable pressure injury- pOA-Dressing/Treatment: Alginate with Ag, Foam  Wound 04/22/21 Coccyx Sacral-coccygeal IAD/MASD - excoriated, denuding skin  - POA-Dressing/Treatment: Other (comment)(venelex ordered)     Elevate legs to decrease edema and redness. Treat PI to heel, and IAD to bottom. Awaiting specialty mattress and bed extender. Heelmedix offloading boots applied bilaterally. Specialty Bed Required : ordered  [x] Low Air Loss   [x] Pressure Redistribution  [] Fluid Immersion  [] Bariatric  [] Total Pressure Relief  [] Other:     Current Diet: DIET GENERAL; Low Potassium    Patient/Caregiver Teaching: wounds, treatment, offloading, protection, incontinence care-changing pullup more often, cleansing.   Level of patient/caregiver understanding able to:   [x] Indicates understanding       [x] Needs reinforcement  [] Unsuccessful      [] Verbal Understanding  [] Demonstrated understanding       [] No evidence of learning  [] Refused teaching         [] N/A       Electronically signed by Shoshana Caruso RN, Fabiola Burdick on 4/22/2021 at 12:52 PM

## 2021-04-23 LAB
ALBUMIN SERPL-MCNC: 3.2 G/DL (ref 3.4–5)
ALBUMIN SERPL-MCNC: 3.3 G/DL (ref 3.4–5)
ALP BLD-CCNC: 311 U/L (ref 40–129)
ALT SERPL-CCNC: 33 U/L (ref 10–40)
ANION GAP SERPL CALCULATED.3IONS-SCNC: 11 MMOL/L (ref 3–16)
AST SERPL-CCNC: 42 U/L (ref 15–37)
BILIRUB SERPL-MCNC: 0.5 MG/DL (ref 0–1)
BILIRUBIN DIRECT: <0.2 MG/DL (ref 0–0.3)
BILIRUBIN, INDIRECT: ABNORMAL MG/DL (ref 0–1)
BUN BLDV-MCNC: 33 MG/DL (ref 7–20)
CALCIUM SERPL-MCNC: 8.8 MG/DL (ref 8.3–10.6)
CHLORIDE BLD-SCNC: 109 MMOL/L (ref 99–110)
CO2: 20 MMOL/L (ref 21–32)
CREAT SERPL-MCNC: 1.9 MG/DL (ref 0.8–1.3)
GFR AFRICAN AMERICAN: 42
GFR NON-AFRICAN AMERICAN: 34
GI BACTERIAL PATHOGENS BY PCR: NORMAL
GLUCOSE BLD-MCNC: 92 MG/DL (ref 70–99)
MAGNESIUM: 2 MG/DL (ref 1.8–2.4)
PHOSPHORUS: 3 MG/DL (ref 2.5–4.9)
POTASSIUM SERPL-SCNC: 4.8 MMOL/L (ref 3.5–5.1)
POTASSIUM SERPL-SCNC: 4.9 MMOL/L (ref 3.5–5.1)
SODIUM BLD-SCNC: 140 MMOL/L (ref 136–145)
TOTAL CK: 270 U/L (ref 39–308)
TOTAL PROTEIN: 7.3 G/DL (ref 6.4–8.2)
TROPONIN: 0.08 NG/ML
TROPONIN: 0.1 NG/ML

## 2021-04-23 PROCEDURE — 80076 HEPATIC FUNCTION PANEL: CPT

## 2021-04-23 PROCEDURE — 51702 INSERT TEMP BLADDER CATH: CPT

## 2021-04-23 PROCEDURE — 6360000002 HC RX W HCPCS: Performed by: STUDENT IN AN ORGANIZED HEALTH CARE EDUCATION/TRAINING PROGRAM

## 2021-04-23 PROCEDURE — 2580000003 HC RX 258: Performed by: STUDENT IN AN ORGANIZED HEALTH CARE EDUCATION/TRAINING PROGRAM

## 2021-04-23 PROCEDURE — 82550 ASSAY OF CK (CPK): CPT

## 2021-04-23 PROCEDURE — 83735 ASSAY OF MAGNESIUM: CPT

## 2021-04-23 PROCEDURE — 6370000000 HC RX 637 (ALT 250 FOR IP)

## 2021-04-23 PROCEDURE — 2060000000 HC ICU INTERMEDIATE R&B

## 2021-04-23 PROCEDURE — 84132 ASSAY OF SERUM POTASSIUM: CPT

## 2021-04-23 PROCEDURE — 84484 ASSAY OF TROPONIN QUANT: CPT

## 2021-04-23 PROCEDURE — 99233 SBSQ HOSP IP/OBS HIGH 50: CPT | Performed by: INTERNAL MEDICINE

## 2021-04-23 PROCEDURE — 36415 COLL VENOUS BLD VENIPUNCTURE: CPT

## 2021-04-23 PROCEDURE — 6370000000 HC RX 637 (ALT 250 FOR IP): Performed by: STUDENT IN AN ORGANIZED HEALTH CARE EDUCATION/TRAINING PROGRAM

## 2021-04-23 PROCEDURE — 80069 RENAL FUNCTION PANEL: CPT

## 2021-04-23 RX ORDER — HYDRALAZINE HYDROCHLORIDE 20 MG/ML
10 INJECTION INTRAMUSCULAR; INTRAVENOUS ONCE
Status: COMPLETED | OUTPATIENT
Start: 2021-04-24 | End: 2021-04-24

## 2021-04-23 RX ORDER — ACETAMINOPHEN 325 MG/1
650 TABLET ORAL EVERY 6 HOURS PRN
Status: DISCONTINUED | OUTPATIENT
Start: 2021-04-23 | End: 2021-04-24 | Stop reason: HOSPADM

## 2021-04-23 RX ORDER — ACETAMINOPHEN 650 MG/1
650 SUPPOSITORY RECTAL EVERY 6 HOURS PRN
Status: DISCONTINUED | OUTPATIENT
Start: 2021-04-23 | End: 2021-04-24 | Stop reason: HOSPADM

## 2021-04-23 RX ORDER — HYDRALAZINE HYDROCHLORIDE 20 MG/ML
5 INJECTION INTRAMUSCULAR; INTRAVENOUS ONCE
Status: DISCONTINUED | OUTPATIENT
Start: 2021-04-23 | End: 2021-04-23

## 2021-04-23 RX ORDER — HYDRALAZINE HYDROCHLORIDE 20 MG/ML
10 INJECTION INTRAMUSCULAR; INTRAVENOUS EVERY 4 HOURS PRN
Status: COMPLETED | OUTPATIENT
Start: 2021-04-23 | End: 2021-04-24

## 2021-04-23 RX ADMIN — ASPIRIN 81 MG: 81 TABLET, CHEWABLE ORAL at 09:24

## 2021-04-23 RX ADMIN — HEPARIN SODIUM 5000 UNITS: 5000 INJECTION INTRAVENOUS; SUBCUTANEOUS at 18:28

## 2021-04-23 RX ADMIN — VANCOMYCIN HYDROCHLORIDE 1250 MG: 10 INJECTION, POWDER, LYOPHILIZED, FOR SOLUTION INTRAVENOUS at 23:22

## 2021-04-23 RX ADMIN — Medication 10 ML: at 09:25

## 2021-04-23 RX ADMIN — HEPARIN SODIUM 5000 UNITS: 5000 INJECTION INTRAVENOUS; SUBCUTANEOUS at 23:22

## 2021-04-23 RX ADMIN — CYANOCOBALAMIN TAB 1000 MCG 1000 MCG: 1000 TAB at 09:24

## 2021-04-23 RX ADMIN — MEROPENEM 1000 MG: 1 INJECTION, POWDER, FOR SOLUTION INTRAVENOUS at 21:45

## 2021-04-23 RX ADMIN — Medication 10 ML: at 21:46

## 2021-04-23 RX ADMIN — TAMSULOSIN HYDROCHLORIDE 0.8 MG: 0.4 CAPSULE ORAL at 09:24

## 2021-04-23 RX ADMIN — Medication: at 09:25

## 2021-04-23 RX ADMIN — PANTOPRAZOLE SODIUM 40 MG: 40 TABLET, DELAYED RELEASE ORAL at 07:27

## 2021-04-23 RX ADMIN — HEPARIN SODIUM 5000 UNITS: 5000 INJECTION INTRAVENOUS; SUBCUTANEOUS at 07:27

## 2021-04-23 RX ADMIN — ACETAMINOPHEN 650 MG: 325 TABLET ORAL at 09:25

## 2021-04-23 RX ADMIN — MEROPENEM 1000 MG: 1 INJECTION, POWDER, FOR SOLUTION INTRAVENOUS at 09:24

## 2021-04-23 RX ADMIN — Medication: at 21:46

## 2021-04-23 RX ADMIN — ATORVASTATIN CALCIUM 80 MG: 80 TABLET, FILM COATED ORAL at 21:46

## 2021-04-23 ASSESSMENT — ENCOUNTER SYMPTOMS
SHORTNESS OF BREATH: 0
RECTAL PAIN: 0
CHEST TIGHTNESS: 0
NAUSEA: 0
DIARRHEA: 0
WHEEZING: 0
ABDOMINAL PAIN: 1

## 2021-04-23 ASSESSMENT — PAIN SCALES - GENERAL
PAINLEVEL_OUTOF10: 2
PAINLEVEL_OUTOF10: 0

## 2021-04-23 NOTE — PROGRESS NOTES
Effort: Pulmonary effort is normal.      Breath sounds: No wheezing. Abdominal:      General: Abdomen is flat. There is no distension. Tenderness: There is no abdominal tenderness. There is no guarding. Musculoskeletal:         General: Swelling present. Right lower leg: Edema present. Left lower leg: Edema present. Comments: Bl LE erythema and 2+ edema   Skin:     Findings: Erythema present. Neurological:      Mental Status: Mental status is at baseline. LABS:    CBC:   Recent Labs     04/21/21 1607 04/22/21 0623   WBC 9.3 6.9   HGB 12.7* 9.7*   HCT 39.2* 29.9*   * 102*   MCV 94.9 94.9     Renal:    Recent Labs     04/21/21  1607 04/21/21  1607 04/21/21 2139 04/21/21  2139 04/22/21  0623 04/22/21  0623 04/22/21  2227 04/23/21  0040 04/23/21  0635     --   --   --  148*  --   --   --  140   K 7.0*   < > 5.2*   < > 5.5*   < > 4.9 4.9 4.8     --   --   --  115*  --   --   --  109   CO2 22  --   --   --  24  --   --   --  20*   BUN 41*  --   --   --  39*  --   --   --  33*   CREATININE 1.9*  --   --   --  1.7*  --   --   --  1.9*   GLUCOSE 87  --   --   --  68*  --   --   --  92   CALCIUM 9.8  --   --   --  8.8  --   --   --  8.8   MG  --   --  2.20  --  2.10  --   --   --  2.00   PHOS  --   --   --   --  3.2  --   --   --  3.0   ANIONGAP 10  --   --   --  9  --   --   --  11    < > = values in this interval not displayed. Hepatic:   Recent Labs     04/22/21  0623 04/22/21  0955 04/23/21  0635   AST  --  35 42*   ALT  --  27 33   BILITOT  --  0.3 0.5   BILIDIR  --  <0.2 <0.2   PROT  --  6.6 7.3   LABALBU 3.1* 3.1* 3.2*  3.3*   ALKPHOS  --  291* 311*     Troponin:   Recent Labs     04/22/21  1832 04/23/21  0040 04/23/21  0635   TROPONINI 0.10* 0.10* 0.08*     BNP: No results for input(s): BNP in the last 72 hours. Lipids: No results for input(s): CHOL, HDL in the last 72 hours.     Invalid input(s): LDLCALCU, TRIGLYCERIDE  ABGs:  No results for input(s): PHART, FUC6URU, PO2ART, LBX4HQP, BEART, THGBART, O1INAOYY, RSB0OFD in the last 72 hours. INR:   Recent Labs     04/22/21  0955   INR 1.22*     Lactate: No results for input(s): LACTATE in the last 72 hours. Cultures:  -----------------------------------------------------------------  RAD:   CT ABDOMEN PELVIS WO CONTRAST Additional Contrast? None   Final Result   1. Wall thickening with perirectal fat stranding of the rectum can be correlated for possible proctitis. 2. Cholelithiasis. 3. No evidence of abnormality within the liver for this nonenhanced exam.   4. Stable appearance of the round atelectasis in the left lower lobe and small left pleural effusion. 5. Stable right renal cyst.    6. Stable left renal calculi. CT HEAD WO CONTRAST   Final Result   1. No CT evidence of an acute intracranial hemorrhage, mass effect or midline structural shift. 2. Incidental mild bilateral basal ganglia calcifications. Limited exam due to poor patient positioning in the scanner. XR CHEST PORTABLE   Final Result      Cardiac enlargement and mild vascular congestion                      Assessment/Plan:     Acute metabolic encephalopathy  2/2 sepsis vs. Hypothermia. Patient presented with temperature of 92F. Patient's mental status back to baseline following sepsis fluids and rewarming.  - Continue to monitor  - TSH 5.3     SIRS+ 2/2 bloodstream infection vs. UTI  SIRS: Tachycardia and Hypothermia. Unikely 2/2 to soft tissue infection. After patient was cleaned no decubitus ulcer was noted.  Wound care will assist with macerated skin at the gluteal folds  - S/p sepsis bolus in ED  - Blood culture x2 NGTD  - Wound culture pending  - Vancomycin (4/21-)  - Merrem (4/22-)   - History of enterococcus faecalis sensitive to vancomycin   - History of psedomonas sensitive to meropenem  - DC antibiotics if cultures are negative   - Gen Surg following for management of stage 2 decubitus ulcers     ALIS on CKD 3  History of of CKD 3. Cr baseline 1.0-1.3. On admission 1.9. Likely prerenal. Pt on lasix at home w/ report of diarrhea for 10days. K elevated to 7.0 on presentation.  - Cr 1.9 this AM  - Nephrology following     Hyperkalemia  7.0 on presentation. Repeat 6.3. Likely 2/2 to ALIS. S/p temporizing measures. - K this AM 4.8      Elevated troponin  Trops seem to be slightly elevated to 0.03 chronically. Likely 2/2 to demand ischemia in the setting to sepsis and decrease clearance in the setting of ALIS. - Tele       BPH   w/ chronic indwelling schneider. Patient does not seem to follow up with urology outpatient. - Continue home Flomax     CAD  S/p CABG  - Continue home ASA and Statin     Paroxysmal atrial fibrillation  Was on full dose aspirin but  decrease to ASA 81mg due to suspected GI bleed. NSR on Tele in the ED.  - Cont ASA 81      Debility/physical deconditioning  Worsening weakness and inability to perform ADLs  - PT/OT following   - PT 6, OT 9  - CM consult for placement  - Spoke to daughter who states she is unable to take care of patient at home    Code Status: full  FEN: diet renal  PPX: subq heparin  DISPO: ip    Alice Mancuso, , PGY-1  04/23/21  9:23 AM     I have personally seen and evaluated this patient. I discussed findings and management with the resident, on 04/23/21  and agree as documented in this note. Encephalopathy resolved, alert and oreinted to person place and time. Doing better, agrees that he needs more help and home, CM working on placement for this patient. Continue Abx for now, if infectious corrales negative can stop Abx on Saturday.      [facility]  Attending Physician

## 2021-04-23 NOTE — PROGRESS NOTES
Surgery Daily Progress Note      CC: stage II decubitus ulcers     Subjective :  Patient reports not sleeping well, otherwise doing ok. ROS: A 14 point review of systems was conducted, significant findings as noted in HPI. All other systems negative. Objective     Exam:  Vitals:    04/23/21 0006 04/23/21 0308 04/23/21 0600 04/23/21 0824   BP: (!) 155/61 (!) 154/80  (!) 171/65   Pulse: 63 57  60   Resp: 18 20  18   Temp: 98.4 °F (36.9 °C) 98.1 °F (36.7 °C)  98.4 °F (36.9 °C)   TempSrc: Oral Oral  Oral   SpO2: 96% 98%  94%   Weight:   211 lb 6.7 oz (95.9 kg)    Height:             General appearance: alert, no acute distress, grooming appropriate  Neuro: A&Ox3, no focal deficits, sensation intact  Chest/Lungs: normal effort, no accessory muscle use  Cardiovascular: RRR, no murmurs/gallops/rubs  Abdomen: soft, non tender, non distended   Buttock: stage II erythematous decubitus ulcers without drainage  Extremities: no edema, no cyanosis        ASSESSMENT/PLAN:   This is a 79 y.o. male who presented with \ams, was found to have stage II sacral wounds on his buttock, general surgery consulted for evaluation    - Stage II decubitus ulcers, with s ome skin breakdown, erythematous, not infected, recommend sacral heart mepilex dressings for extra protection  - Q2Hour turns for prevention of further breakdown  - medical management per primary   - please call if exam changes or any questions       Kashmir Vernon MD  Gen.  Surgery Resident PGY3  04/23/21  8:53 AM  [phone]

## 2021-04-23 NOTE — PROGRESS NOTES
Clinical Pharmacy Consult Note    Admit date: 4/21/2021    Subjective/Objective:  79year-old male with PMHx that includes BPH, CAD, A fib, HTN, and HLD who is admitted with altered mental status, hypothermia, and decubitus ulcers on buttocks and feet. Recent positive urine culture with pseudomonas and enterococcus (12/2020). Pharmacy is consulted to dose Vancomycin per Dr. Patrick Cochran    Pertinent Medications:    (4/21-4/22)   Meropenem 1g IV q12h - day #2  Vancomycin - day #3   1.5g IV x1    1.25g IV q24h (4/22-current)     Recent Labs     04/21/21  1607 04/21/21  1607 04/22/21  0623 04/22/21  0623 04/22/21  2227 04/23/21  0040     --  148*  --   --   --    K 7.0*   < > 5.5*   < > 4.9 4.9     --  115*  --   --   --    CO2 22  --  24  --   --   --    PHOS  --   --  3.2  --   --   --    BUN 41*  --  39*  --   --   --    CREATININE 1.9*  --  1.7*  --   --   --     < > = values in this interval not displayed. Estimated Creatinine Clearance: 42 mL/min (A) (based on SCr of 1.7 mg/dL (H)). Recent Labs     04/21/21  1607 04/22/21  0623   WBC 9.3 6.9   HGB 12.7* 9.7*   HCT 39.2* 29.9*   MCV 94.9 94.9   * 102*       Height:  6' (182.9 cm)  Weight: 209 lb 10.5 oz (95.1 kg)    Micro:   Stool (4/22): Negative   Blood x2 (4/21): NGTD      Assessment/Plan:  1. Sepsis 2/2 bacteremia vs UTI :  vancomycin (day #3) + meropenem (day #2)  Vancomycin  · Patient currently on 1.25g IV q24h. Renal function stablewill continue current dosing. · Trough will be drawn when appropriate, goal trough ~15 mcg/mL   · Renal function will monitored and dose adjusted as appropriate. Meropenem  · 1g IV q12h remains appropriate for current renal function (25-50 mL/min). Usual dosing is 1g IV q8h. Will continue to monitor and adjust as appropriate per [facility] Renal Dose Adjustment Protocol. Please call with any questions. Thank you for consulting pharmacy!   Mark Allen, PharmD, BCPS  Main Pharmacy: [phone]  [facility]: [phone]  4/23/2021 7:14 AM

## 2021-04-23 NOTE — CARE COORDINATION
CM called pt's daughter Brenden Fowler multiple times, left voicemail. No return call yet. CM spoke with patient at bedside. He states he would be willing to go to SNF if his insurance pays for it. He has been to [facility], would go back if they can take him. CM sent referrals to [facility], [facility] and [facility]. Patient will need pre-cert when accepted. CM to follow up on referrals. Patient accepted at [facility]. CM faxed [facility] 5689 packet. Hens no. [phone]. Will need transport at discharge.     Samina Phillips, RN, BSN,   4th Floor Progressive Care Unit  [phone]

## 2021-04-23 NOTE — DISCHARGE INSTR - COC
Continuity of Care Form    Patient Name: Imani Lerner   :  1940  MRN:  [id number]    Admit date:  2021  Discharge date:  2021    Code Status Order: Full Code   Advance Directives:   [facility] Documentation       Date/Time Healthcare Directive Type of Healthcare Directive Copy in [facility] [address] Agent's Name Healthcare Agent's Phone Number    [phone]  Yes, patient has an advance directive for healthcare treatment  Living will  --  --  --  --            Admitting Physician:  Penny Smart DO  PCP: Daisha Kelly    Discharging Nurse: [facility] Unit/Room#: 4743/1639-90  Discharging Unit Phone Number: [phone]    Emergency Contact:   Extended Emergency Contact Information  Primary Emergency Contact: [place] [address] Phone: [phone]  Work Phone: [phone]  Mobile Phone: [phone]  Relation: Child  Secondary Emergency Contact: Dana Lentz  Address: [address]  Home Phone: [phone]  Relation: Other    Past Surgical History:  Past Surgical History:   Procedure Laterality Date    BACK SURGERY  2006    lower lumbar    CORONARY ARTERY BYPASS GRAFT  2013    CABG x 5 (Dr Genaro Donis), svg to diag, om1 and 3, distal rca, kelly to lad.      CYSTOSCOPY N/A 1/10/2019    CYSTOSCOPY performed by Essie Thompson MD at [address] 2015    ORI    SIGMOIDOSCOPY N/A 2020    SIGMOIDOSCOPY DIAGNOSTIC FLEXIBLE performed by Janice Cruz MD at [address] [place] [address] N/A 2020    EGD BIOPSY performed by Janice Cruz MD at [facility] ENDOSCOPY       Immunization History:   Immunization History   Administered Date(s) Administered    Influenza A ([id number]) Vaccine PF IM 12/10/2009    Influenza Vaccine, unspecified formulation 2012, 2014, 10/13/2015, 2016, 2017, 10/26/2018, 2019    Influenza Virus Vaccine 12/19/2005, 12/14/2006, 12/13/2012, 11/30/2013, 10/13/2015, 09/20/2019    Influenza Whole 10/01/2007, 09/02/2010, 09/01/2011, 12/13/2012, 10/13/2015    Influenza, High Dose (Fluzone 65 yrs and older) 11/06/2014, 10/20/2020    PPD Test 09/16/1997    Pneumococcal Conjugate 13-valent (Qxvnjvr50) 12/29/2014    Pneumococcal Conjugate 7-valent (Prevnar7) 12/29/2014    Pneumococcal Polysaccharide (Hclrqrjdq19) 01/27/2014       Active Problems:  Patient Active Problem List   Diagnosis Code    Hypotension I95.9    Light headed R42    Cellulitis L03.90    Essential hypertension I10    GERD (gastroesophageal reflux disease) K21.9    Acute blood loss anemia D62    Tinea corporis B35.4    Carotid stenosis I65.29    CAD (coronary artery disease) I25.10    S/P CABG x 5 Z95.1    Lower GI bleeding K92.2    Hip fracture, right ([facility]) S72.001A    Acute right hip pain M25.551    Acute low back pain without sciatica M54.5    Hypothermia T68. [place]    Complicated UTI (urinary tract infection) N39.0    Edema R60.9    Problem with urinary catheter ([facility] T83. 9XXA    Acute encephalopathy G93.40    Chronic indwelling Iglesias catheter Z97.8    Hyperlipidemia E78.5    Bilateral lower leg cellulitis L03.116, L03.115    Neurogenic bladder N31.9    Bacteriuria R82.71    Abnormality of urethral meatus Q64.70    Gross hematuria R31.0    CHF with unknown LVEF ([facility]) I50.9    Dyspnea R06.00    Bradycardia R00.1    Pseudomonas infection A49.8    Acute renal injury ([facility] N17.9    Cellulitis of scrotum N49.2    Constipation K59.00    Encopresis with constipation and overflow incontinence R15.9    Abnormal stress test R94.39    H/O angiography Z92.89    Abnormal angiogram R93.89    Acute cystitis with hematuria N30.01    Acute renal failure superimposed on stage 3 chronic kidney disease ([facility]) N17.9, N18.30    Urinary tract infection associated with indwelling urethral catheter ([facility]) T83.511A, N39.0    Encephalopathy acute G93.40    ALIS (acute kidney injury) ([facility] N17.9    Altered mental status R41.82    Hyperkalemia E87.5       Isolation/Infection:   Isolation            No Isolation          Patient Infection Status       Infection Onset Added Last Indicated Last Indicated By Review Planned Expiration Resolved Resolved By    None active    Resolved    C-diff Rule Out 04/21/21 04/21/21 04/22/21 GI Bacterial Pathogens By PCR (Ordered)   04/22/21 Aj Martinez RN    Order noted to be discontinued by MD; not included in GI pathogens order    COVID-19 Rule Out 02/19/21 02/19/21 02/19/21 COVID-19, Rapid (Ordered)   02/19/21 Rule-Out Test Resulted    C-diff Rule Out 12/13/20 12/13/20 12/13/20 Clostridium difficile toxin/antigen (Ordered)   02/16/21 Kisha Benjamin RN    No stool ever sent.      COVID-19 Rule Out 06/10/20 06/10/20 06/10/20 COVID-19 (Ordered)   06/10/20 Rule-Out Test Resulted    C-diff Rule Out 06/09/20 06/09/20 06/09/20 GI Bacterial Pathogens By PCR (Ordered)   06/10/20 Rule-Out Test Resulted    C-diff Rule Out 06/09/20 06/09/20 06/09/20 Clostridium difficile toxin/antigen (Ordered)   06/09/20 Rule-Out Test Resulted    C-diff Rule Out 05/03/20 05/04/20 05/04/20 C. difficile toxin Molecular (Ordered)   05/05/20 Rule-Out Test Resulted    MDRO (multi-drug resistant organism) 05/02/20 05/04/20 05/02/20 Culture, Urine   04/22/21 Aisha Correa, RN    Has had new urine culture on 4/17/21 with mixed anne; no MDRO noted    COVID-19 Rule Out 05/03/20 05/03/20 05/03/20 COVID-19 (Ordered)   05/03/20 Rule-Out Test Resulted    C-diff Rule Out 05/02/20 05/02/20 05/03/20 Clostridium difficile toxin/antigen (Ordered)   05/03/20 Rule-Out Test Resulted    MRSA 02/07/20 02/08/20 02/07/20 MRSA DNA Probe, Nasal   05/04/20 Shen Grande RN    MDRO (multi-drug resistant organism) 10/26/19 10/28/19 11/19/19 Urine culture   01/16/20 Shen Grande RN            Nurse Assessment:  Last Vital Signs: BP 04/22/21 1100   Wound Width (cm) 2 cm 04/22/21 1100   Wound Depth (cm) 0.1 cm 04/22/21 1100   Wound Surface Area (cm^2) 4 cm^2 04/22/21 1100   Wound Volume (cm^3) 0.4 cm^3 04/22/21 1100   Wound Assessment Bleeding;Pink/red 04/22/21 1750   Drainage Amount Small 04/22/21 1750   Drainage Description Sanguinous 04/22/21 1750   Odor None 04/22/21 1750   Usha-wound Assessment Intact 04/22/21 1750   Margins Attached edges; Unattached edges; Undefined edges 04/22/21 1750   Number of days: 1       Wound 04/22/21 Coccyx Sacral-coccygeal IAD/MASD - excoriated, denuding skin  - POA (Active)   Wound Etiology Other 04/22/21 1750   Dressing/Treatment Other (comment) 04/22/21 1750   Wound Assessment Denuded;Erythema;Pink/red;Superficial;Other (Comment) 04/22/21 1750   Drainage Amount Moderate 04/22/21 1750   Drainage Description Serosanguinous 04/22/21 1750   Odor None 04/22/21 1750   Usha-wound Assessment Blanchable erythema 04/22/21 1750   Margins Unattached edges; Undefined edges 04/22/21 1750   Wound Thickness Description not for Pressure Injury Partial thickness 04/22/21 1750   Number of days: 1       Wound 04/22/21 Leg Right;Medial;Lower Medical device pressure injuries, stage 2 X 2 horizontyal lines intact serous filled blisters  - POA (Active)   Number of days: 1        Elimination:  Continence:   · Bowel: No  · Bladder: No  Urinary Catheter: Insertion Date: 04/22/21   Colostomy/Ileostomy/Ileal Conduit: No       Date of Last BM: ***    Intake/Output Summary (Last 24 hours) at 4/23/2021 1432  Last data filed at 4/23/2021 1006  Gross per 24 hour   Intake 140 ml   Output 1975 ml   Net -1835 ml     I/O last 3 completed shifts: In: 140 [P.O.:140]  Out: 2075 [Urine:2075]    Safety Concerns: At Risk for Falls    Impairments/Disabilities:      None    Nutrition Therapy:  Current Nutrition Therapy:   - Oral Diet:  General and Low Sodium (2gm)    Routes of Feeding: Oral  Liquids:  Thin Liquids  Daily Fluid Restriction: no  Last Modified Barium Swallow with Video (Video Swallowing Test): not done    Treatments at the Time of Hospital Discharge:   Respiratory Treatments: RA  Oxygen Therapy:  is not on home oxygen therapy. Ventilator:    - No ventilator support    Rehab Therapies: Physical Therapy and Occupational Therapy  Weight Bearing Status/Restrictions: No weight bearing restirctions  Other Medical Equipment (for information only, NOT a DME order):  walker  Other Treatments: NA    Patient's personal belongings (please select all that are sent with patient):  None    RN SIGNATURE:  Electronically signed by Jennifer Fonseca RN on 4/24/21 at 3:49 PM EDT    CASE MANAGEMENT/SOCIAL WORK SECTION    Inpatient Status Date: 04/21/21    Readmission Risk Assessment Score:  Readmission Risk              Risk of Unplanned Readmission:        30           Discharging to Facility/ Agency   Name:   [facility]  Fax Failed  [address] [facility] [address]       Phone: [phone]       Fax: [phone]      ·     / signature: Electronically signed by Lillie Farmer RN on 4/23/21 at 2:32 PM EDT    PHYSICIAN SECTION    Prognosis: Fair    Condition at Discharge: Stable    Rehab Potential (if transferring to Rehab): N/a    Recommended Labs or Other Treatments After Discharge:   1. Stage II decubitus ulcers, with s ome skin breakdown, erythematous, not infected, recommend sacral heart mepilex dressings for extra protection  2. Physical and occupational therapy    Physician Certification: I certify the above information and transfer of Shay Horton  is necessary for the continuing treatment of the diagnosis listed and that he requires [facility] for less 30 days.      Update Admission H&P: No change in H&P    PHYSICIAN SIGNATURE:  Electronically signed by Martha Bazzi MD on 4/24/21 at 8:48 AM EDT

## 2021-04-23 NOTE — PROGRESS NOTES
Office : [phone]     Fax :[phone]         Renal Progress Note  Subjective:   Admit Date: 4/21/2021     HPI      Interval History: Patient seen and examined this morning. No overnight events. More awake and alert today. Denies CP, SOB or nausea/vomiting. Endorses mild headache. DIET DIET GENERAL; Low Potassium  Medications:   Scheduled Meds:   vancomycin  1,250 mg Intravenous Q24H    meropenem  1,000 mg Intravenous Q12H    Venelex   Topical BID    sodium chloride flush  5-40 mL Intravenous 2 times per day    aspirin  81 mg Oral Daily    atorvastatin  80 mg Oral Nightly    pantoprazole  40 mg Oral QAM AC    tamsulosin  0.8 mg Oral Daily    vitamin B-12  1,000 mcg Oral Daily    heparin (porcine)  5,000 Units Subcutaneous 3 times per day     Continuous Infusions:   sodium chloride         Labs:  CBC:   Recent Labs     04/21/21 1607 04/22/21  0623   WBC 9.3 6.9   HGB 12.7* 9.7*   * 102*     BMP:    Recent Labs     04/21/21  1607 04/21/21  1607 04/22/21  0623 04/22/21 0623 04/22/21  2227 04/23/21  0040 04/23/21  0635     --  148*  --   --   --  140   K 7.0*   < > 5.5*   < > 4.9 4.9 4.8     --  115*  --   --   --  109   CO2 22  --  24  --   --   --  20*   BUN 41*  --  39*  --   --   --  33*   CREATININE 1.9*  --  1.7*  --   --   --  1.9*   GLUCOSE 87  --  68*  --   --   --  92    < > = values in this interval not displayed.      Ca/Mg/Phos:   Recent Labs     04/21/21  1607 04/21/21  2139 04/22/21  0623 04/23/21  0635   CALCIUM 9.8  --  8.8 8.8   MG  --  2.20 2.10 2.00   PHOS  --   --  3.2 3.0     Hepatic:   Recent Labs 04/22/21  0955 04/23/21  0635   AST 35 42*   ALT 27 33   BILITOT 0.3 0.5   ALKPHOS 291* 311*     Troponin:   Recent Labs     04/22/21  1832 04/23/21  0040 04/23/21  0635   TROPONINI 0.10* 0.10* 0.08*     BNP: No results for input(s): BNP in the last 72 hours. Lipids: No results for input(s): CHOL, TRIG, HDL, LDLCALC, LABVLDL in the last 72 hours. ABGs: No results for input(s): PHART, PO2ART, BKT1TUO in the last 72 hours. INR:   Recent Labs     04/22/21  0955   INR 1.22*     UA:  Recent Labs     04/21/21  1607   COLORU Yellow   CLARITYU Clear   GLUCOSEU Negative   BILIRUBINUR Negative   KETUA 15*   SPECGRAV 1.025   BLOODU LARGE*   PHUR 6.0   PROTEINU >=300*   UROBILINOGEN 0.2   NITRU Negative   LEUKOCYTESUR TRACE*   LABMICR YES   URINETYPE NotGiven      Urine Microscopic:   Recent Labs     04/21/21  1607   BACTERIA 2+*   WBCUA 3-5   RBCUA 21-50*   EPIU 0-1     Urine Culture: No results for input(s): LABURIN in the last 72 hours. Urine Chemistry:   Recent Labs     04/21/21  2345   LABCREA 72.5   PROTEINUR 166.00*   NAUR 67       Objective:   Vitals: BP (!) 171/65   Pulse 60   Temp 98.4 °F (36.9 °C) (Oral)   Resp 18   Ht 6' (1.829 m)   Wt 211 lb 6.7 oz (95.9 kg)   SpO2 94%   BMI 28.67 kg/m²    Wt Readings from Last 3 Encounters:   04/23/21 211 lb 6.7 oz (95.9 kg)   02/15/21 218 lb 4.1 oz (99 kg)   12/17/20 198 lb (89.8 kg)      24HR INTAKE/OUTPUT:      Intake/Output Summary (Last 24 hours) at 4/23/2021 8237  Last data filed at 4/23/2021 3177  Gross per 24 hour   Intake 140 ml   Output 3175 ml   Net -3035 ml     Constitutional:  Alert, awake, no apparent distress  NECK: supple, no JVD  Cardiovascular:  S1, S2 without m/r/g  Respiratory:  CTA B without w/r/r  Abdomen: +bs, soft, nt  Ext: LE wrapped bilaterally     Assessment and Plan:       IMAGING:  CT ABDOMEN PELVIS WO CONTRAST Additional Contrast? None   Final Result   1.  Wall thickening with perirectal fat stranding of the rectum can be correlated for

## 2021-04-24 VITALS
OXYGEN SATURATION: 95 % | HEART RATE: 63 BPM | HEIGHT: 72 IN | TEMPERATURE: 98.8 F | SYSTOLIC BLOOD PRESSURE: 147 MMHG | WEIGHT: 211.2 LBS | RESPIRATION RATE: 18 BRPM | BODY MASS INDEX: 28.61 KG/M2 | DIASTOLIC BLOOD PRESSURE: 70 MMHG

## 2021-04-24 LAB
ALBUMIN SERPL-MCNC: 3.4 G/DL (ref 3.4–5)
ANION GAP SERPL CALCULATED.3IONS-SCNC: 12 MMOL/L (ref 3–16)
BASOPHILS ABSOLUTE: 0 K/UL (ref 0–0.2)
BASOPHILS RELATIVE PERCENT: 0.4 %
BUN BLDV-MCNC: 28 MG/DL (ref 7–20)
CALCIUM SERPL-MCNC: 8.9 MG/DL (ref 8.3–10.6)
CHLORIDE BLD-SCNC: 112 MMOL/L (ref 99–110)
CO2: 20 MMOL/L (ref 21–32)
CREAT SERPL-MCNC: 1.5 MG/DL (ref 0.8–1.3)
EOSINOPHILS ABSOLUTE: 0.2 K/UL (ref 0–0.6)
EOSINOPHILS RELATIVE PERCENT: 3.2 %
GFR AFRICAN AMERICAN: 55
GFR NON-AFRICAN AMERICAN: 45
GLUCOSE BLD-MCNC: 88 MG/DL (ref 70–99)
HCT VFR BLD CALC: 33.1 % (ref 40.5–52.5)
HEMOGLOBIN: 10.9 G/DL (ref 13.5–17.5)
LYMPHOCYTES ABSOLUTE: 1.4 K/UL (ref 1–5.1)
LYMPHOCYTES RELATIVE PERCENT: 19 %
MAGNESIUM: 1.9 MG/DL (ref 1.8–2.4)
MCH RBC QN AUTO: 31 PG (ref 26–34)
MCHC RBC AUTO-ENTMCNC: 32.8 G/DL (ref 31–36)
MCV RBC AUTO: 94.6 FL (ref 80–100)
MONOCYTES ABSOLUTE: 0.6 K/UL (ref 0–1.3)
MONOCYTES RELATIVE PERCENT: 8 %
NEUTROPHILS ABSOLUTE: 5.1 K/UL (ref 1.7–7.7)
NEUTROPHILS RELATIVE PERCENT: 69.4 %
PDW BLD-RTO: 18.4 % (ref 12.4–15.4)
PHOSPHORUS: 3 MG/DL (ref 2.5–4.9)
PLATELET # BLD: 74 K/UL (ref 135–450)
PMV BLD AUTO: 9.7 FL (ref 5–10.5)
POTASSIUM SERPL-SCNC: 4.6 MMOL/L (ref 3.5–5.1)
RBC # BLD: 3.5 M/UL (ref 4.2–5.9)
SARS-COV-2, NAAT: NOT DETECTED
SODIUM BLD-SCNC: 144 MMOL/L (ref 136–145)
WBC # BLD: 7.4 K/UL (ref 4–11)

## 2021-04-24 PROCEDURE — 2580000003 HC RX 258: Performed by: STUDENT IN AN ORGANIZED HEALTH CARE EDUCATION/TRAINING PROGRAM

## 2021-04-24 PROCEDURE — 85025 COMPLETE CBC W/AUTO DIFF WBC: CPT

## 2021-04-24 PROCEDURE — 6370000000 HC RX 637 (ALT 250 FOR IP): Performed by: STUDENT IN AN ORGANIZED HEALTH CARE EDUCATION/TRAINING PROGRAM

## 2021-04-24 PROCEDURE — 51702 INSERT TEMP BLADDER CATH: CPT

## 2021-04-24 PROCEDURE — 87635 SARS-COV-2 COVID-19 AMP PRB: CPT

## 2021-04-24 PROCEDURE — 83735 ASSAY OF MAGNESIUM: CPT

## 2021-04-24 PROCEDURE — 6360000002 HC RX W HCPCS: Performed by: INTERNAL MEDICINE

## 2021-04-24 PROCEDURE — 6360000002 HC RX W HCPCS: Performed by: STUDENT IN AN ORGANIZED HEALTH CARE EDUCATION/TRAINING PROGRAM

## 2021-04-24 PROCEDURE — 36415 COLL VENOUS BLD VENIPUNCTURE: CPT

## 2021-04-24 PROCEDURE — 99233 SBSQ HOSP IP/OBS HIGH 50: CPT | Performed by: INTERNAL MEDICINE

## 2021-04-24 PROCEDURE — 80069 RENAL FUNCTION PANEL: CPT

## 2021-04-24 RX ORDER — HYDRALAZINE HYDROCHLORIDE 20 MG/ML
10 INJECTION INTRAMUSCULAR; INTRAVENOUS ONCE
Status: COMPLETED | OUTPATIENT
Start: 2021-04-24 | End: 2021-04-24

## 2021-04-24 RX ORDER — AMLODIPINE BESYLATE 5 MG/1
5 TABLET ORAL DAILY
Status: DISCONTINUED | OUTPATIENT
Start: 2021-04-24 | End: 2021-04-24 | Stop reason: HOSPADM

## 2021-04-24 RX ORDER — FUROSEMIDE 40 MG/1
40 TABLET ORAL DAILY PRN
Qty: 120 TABLET | Refills: 2 | Status: SHIPPED | OUTPATIENT
Start: 2021-04-24 | End: 2021-04-24 | Stop reason: SDUPTHER

## 2021-04-24 RX ORDER — AMLODIPINE BESYLATE 5 MG/1
5 TABLET ORAL DAILY
Qty: 30 TABLET | Refills: 3 | Status: ON HOLD | OUTPATIENT
Start: 2021-04-24 | End: 2022-01-20 | Stop reason: HOSPADM

## 2021-04-24 RX ORDER — FUROSEMIDE 40 MG/1
40 TABLET ORAL DAILY PRN
Qty: 120 TABLET | Refills: 2 | Status: SHIPPED | OUTPATIENT
Start: 2021-04-24 | End: 2021-06-16

## 2021-04-24 RX ADMIN — AMLODIPINE BESYLATE 5 MG: 5 TABLET ORAL at 11:14

## 2021-04-24 RX ADMIN — Medication: at 09:29

## 2021-04-24 RX ADMIN — HEPARIN SODIUM 5000 UNITS: 5000 INJECTION INTRAVENOUS; SUBCUTANEOUS at 06:47

## 2021-04-24 RX ADMIN — ASPIRIN 81 MG: 81 TABLET, CHEWABLE ORAL at 09:25

## 2021-04-24 RX ADMIN — HYDRALAZINE HYDROCHLORIDE 10 MG: 20 INJECTION INTRAMUSCULAR; INTRAVENOUS at 06:55

## 2021-04-24 RX ADMIN — TAMSULOSIN HYDROCHLORIDE 0.8 MG: 0.4 CAPSULE ORAL at 09:25

## 2021-04-24 RX ADMIN — HYDRALAZINE HYDROCHLORIDE 10 MG: 20 INJECTION INTRAMUSCULAR; INTRAVENOUS at 09:25

## 2021-04-24 RX ADMIN — PANTOPRAZOLE SODIUM 40 MG: 40 TABLET, DELAYED RELEASE ORAL at 06:47

## 2021-04-24 RX ADMIN — HYDRALAZINE HYDROCHLORIDE 10 MG: 20 INJECTION INTRAMUSCULAR; INTRAVENOUS at 00:14

## 2021-04-24 RX ADMIN — Medication 10 ML: at 09:27

## 2021-04-24 RX ADMIN — CYANOCOBALAMIN TAB 1000 MCG 1000 MCG: 1000 TAB at 09:25

## 2021-04-24 RX ADMIN — HYDRALAZINE HYDROCHLORIDE 10 MG: 20 INJECTION INTRAMUSCULAR; INTRAVENOUS at 11:20

## 2021-04-24 ASSESSMENT — PAIN SCALES - GENERAL: PAINLEVEL_OUTOF10: 0

## 2021-04-24 NOTE — CARE COORDINATION
Called [place] [phone] and pt has been approved:    Deb Rg #[id number]  [facility]: # [id number]    Follow up clinicals can be sent to Aravind Booker at [phone] and her phone is [phone]           Case Management Assessment            Discharge Note                    Date / Time of Note: 4/24/2021 2:27 PM                  Discharge Note Completed by: Letty Cooks    Patient Name: Rodrigo Bobo   YOB: 1940  Diagnosis: Encephalopathy acute [G93.40]   Date / Time: 4/21/2021  3:18 PM    Current PCP: Tim Matt patient: No    Hospitalization in the last 30 days: No    Advance Directives:  Code Status: Full Code  [place] DNR form completed and on chart: Not Indicated    Financial:  Payor: Aneudy Gaston / Plan: Virgil Sweeney / Product Type: *No Product type* /      Pharmacy:    [address] [place], [place] [id number] - F [phone]  [address] [place] [address]  Phone: [phone] Fax: [phone]    [facility][id number] [address] [phone] - F [phone]  [place] [id number]  Phone: [phone] Fax: [phone]      Assistance purchasing medications?: Potential Assistance Purchasing Medications: No  Assistance provided by Case Management: None at this time    Does patient want to participate in local refill/ meds to beds program?: No    Meds To Beds General Rules:  1. Can ONLY be done Monday- Friday between 8:30am-5pm  2. Prescription(s) must be in pharmacy by 3pm to be filled same day  3. Copy of patient's insurance/ prescription drug card and patient face sheet must be sent along with the prescription(s)  4. Cost of Rx cannot be added to hospital bill. If financial assistance is needed, please contact unit  or ;  or  CANNOT provide pharmacy voucher for patients co-pays  5.  Patients can then  the prescription on their way out of the hospital at discharge, or pharmacy can deliver to the bedside if staff is available. (payment due at time of pick-up or delivery - cash, check, or card accepted)     Able to afford home medications/ co-pay costs: Yes    ADLS:  Current PT AM-PAC Score: 6 /24  Current OT AM-PAC Score: 9 /24      DISCHARGE Disposition: [facility] (SNF): [facility] Phone: [phone] Fax: [phone]    LOC at discharge: Skilled  [address] Completed: Yes    Notification completed in HENS/PAS?:  Yes : CM has completed HENS online through secure website for SNF admission at [facility]. Document ID #: [id number]    IMM Completed:   No    Transportation:  Transportation PLAN for discharge: EMS transportation   Mode of Transport: Ambulance stretcher - BLS  Reason for medical transport: Lethargic due to encephalopthay and requires ambulance transport due to two person assist  Name of [address]: [address]  Phone: [phone]  Time of Transport: 1600    Transport form completed: Yes      The Plan for Transition of Care is related to the following treatment goals of Encephalopathy acute [G93.40]    The Patient and/or patient representative [employer] and his family were provided with a choice of provider and agrees with the discharge plan Yes    Freedom of choice list was provided with basic dialogue that supports the patient's individualized plan of care/goals and shares the quality data associated with the providers.  Yes    Care Transitions patient: Yes    Faisal Lauren RN  [facility]  Case Management Department  Ph: [phone]  Fax: [phone]

## 2021-04-24 NOTE — PLAN OF CARE
Problem: Falls - Risk of:  Goal: Will remain free from falls  Description: Will remain free from falls  Outcome: Ongoing   Patient meets [facility] fall risk guidelines. Non skid footwear with ambulation. Bed in lowest position. Call light in reach. Bed alarm activated. Reminded to call for assistance before exiting bed. Continue safety precautions. Problem: Skin Integrity:  Goal: Will show no infection signs and symptoms  Description: Will show no infection signs and symptoms  Outcome: Ongoing     Patient with multiple areas of skin issues. See Assessment. Specialty mattress surface in place. Turn and reposition every two hours and as needed. Pillow support in place. Problem: Pain:  Goal: Pain level will decrease  Description: Pain level will decrease  Outcome: Ongoing   Patient denies any pain at this time.

## 2021-04-24 NOTE — DISCHARGE SUMMARY
INTERNAL MEDICINE    RESIDENT DISCHARGE SUMMARY   Discharge Summaries      Patient ID: Nakul Calhoun   Gender: male      :  1940  AGE: 79 y.o. MRN:  [id number]  Code Status: Full Code   PCP: INES TSANG    Admit date:  2021      Discharge date:  No discharge date for patient encounter. Admitting Physician:  José Antonio Brady DO    Discharge Physician: Manolo Steen DO     Admission Condition:  fair  Discharged Condition:  good Stable    Discharge Diagnoses: Active Hospital Problems    Diagnosis Date Noted    ALIS (acute kidney injury) ([facility] [address] [N17.9]     Altered mental status [R41.82]     Hyperkalemia [E87.5]     Encephalopathy acute [G93.40] 2021       The patient was seen and examined on day of discharge and this discharge summary is in conjunction with any daily progress note from day of discharge. Hospital Course:   Reed Block is a 79 y. o. male with a history of BPH and indwelling catheter, recurrent UTIs, CAD s/p CABG, A. fib, HTN, HLD, CKD3, who presented from home with AMS. Pt lives alone and is often fully able to accomplish ADLs with help from a home health nurse. For the past few days he has been less interactive than usual. Yesterday, home health care called EMS to his house multiple time to help him with mobility. Today, daughter found him on the floor unable to get up and speaking to his urinary catheter so called 911. She reports concern about pts safety alone at home. Upon examination, pt reports he's had worsening diarrhea which began 10days ago and is associated with abd bloating. He has about 3-4 watery brown stools daily. He believed his in the hospital for the diarrhea and a possible UTI. He denies every feeling confused but reports feeling week over the past few days. He was recently prescribed Lomotil which he states helped a bit. His last use of Abx was about 2 months ago. He is currently AAOx4.  He reports a headache but denies F/C, 7.4 04/24/2021    HGB 10.9 04/24/2021    HCT 33.1 04/24/2021    PLT 74 04/24/2021       Renal:    Lab Results   Component Value Date     04/24/2021    K 4.6 04/24/2021    K 7.0 04/21/2021     04/24/2021    CO2 20 04/24/2021    BUN 28 04/24/2021    CREATININE 1.5 04/24/2021    CALCIUM 8.9 04/24/2021    PHOS 3.0 04/24/2021         Significant Diagnostic Studies    Radiology:   CT ABDOMEN PELVIS WO CONTRAST Additional Contrast? None   Final Result   1. Wall thickening with perirectal fat stranding of the rectum can be correlated for possible proctitis. 2. Cholelithiasis. 3. No evidence of abnormality within the liver for this nonenhanced exam.   4. Stable appearance of the round atelectasis in the left lower lobe and small left pleural effusion. 5. Stable right renal cyst.    6. Stable left renal calculi. CT HEAD WO CONTRAST   Final Result   1. No CT evidence of an acute intracranial hemorrhage, mass effect or midline structural shift. 2. Incidental mild bilateral basal ganglia calcifications. Limited exam due to poor patient positioning in the scanner. XR CHEST PORTABLE   Final Result      Cardiac enlargement and mild vascular congestion                         Consults:     IP CONSULT TO CRITICAL CARE  IP CONSULT TO HOSPITALIST  IP CONSULT TO SOCIAL WORK  IP CONSULT TO NEPHROLOGY  PHARMACY TO DOSE VANCOMYCIN  IP CONSULT TO GENERAL SURGERY  PHARMACY TO DOSE VANCOMYCIN      Discharge Instructions/Follow-up:  PCP within 1 week    Activity: activity as tolerated    Diet: regular diet    Discharge Medications:     Current Discharge Medication List           Details   amLODIPine (NORVASC) 5 MG tablet Take 1 tablet by mouth daily  Qty: 30 tablet, Refills: 3              Details   furosemide (LASIX) 40 MG tablet Take 1 tablet by mouth daily as needed (Leg swelling)  Qty: 120 tablet, Refills: 2              Details   gabapentin (NEURONTIN) 600 MG tablet Take 600 mg by mouth 2 times daily. vitamin B-12 (CYANOCOBALAMIN) 1000 MCG tablet Take 1,000 mcg by mouth daily      aspirin 81 MG chewable tablet Take 1 tablet by mouth daily  Qty: 30 tablet, Refills: 3      pantoprazole (PROTONIX) 40 MG tablet Take 1 tablet by mouth every morning (before breakfast)  Qty: 30 tablet, Refills: 3      Melatonin 10 MG TABS Take 10 mg by mouth nightly as needed       atorvastatin (LIPITOR) 80 MG tablet Take 80 mg by mouth nightly. zolpidem (AMBIEN) 10 MG tablet Take 10 mg by mouth nightly as needed for Sleep. dicyclomine (BENTYL) 20 MG tablet Take 20 mg by mouth 3 times daily as needed      docusate sodium (COLACE) 100 MG capsule Take 1 capsule by mouth 2 times daily  Qty: 30 capsule, Refills: 0      Balsam [place]-Castor Oil (VENELEX) OINT ointment Apply topically 2 times daily  Qty: 2 Tube, Refills: 0      tamsulosin (FLOMAX) 0.4 MG capsule Take 0.8 mg by mouth daily              Time Spent on discharge is more than 45 minutes in the examination, evaluation, counseling and review of medications and discharge plan. Signed:    Angela Tom DO   Internal Medicine Resident, PGY-1  4/24/2021      Thank you INES Renteria for the opportunity to be involved in this patient's care. If you have any questions or concerns please feel free to contact me.

## 2021-04-24 NOTE — PROGRESS NOTES
Pt d/c with transport to [facility] all personal belongings. IV and tele removed and tolerated well. Pt denies any questions at this time.

## 2021-04-25 LAB
BLOOD CULTURE, ROUTINE: NORMAL
CULTURE, BLOOD 2: NORMAL

## 2021-05-31 ENCOUNTER — APPOINTMENT (OUTPATIENT)
Dept: GENERAL RADIOLOGY | Age: 81
End: 2021-05-31
Payer: MEDICARE

## 2021-05-31 ENCOUNTER — HOSPITAL ENCOUNTER (EMERGENCY)
Age: 81
Discharge: HOME | End: 2021-05-31
Attending: EMERGENCY MEDICINE
Payer: MEDICARE

## 2021-05-31 VITALS
HEART RATE: 72 BPM | SYSTOLIC BLOOD PRESSURE: 113 MMHG | TEMPERATURE: 98.1 F | OXYGEN SATURATION: 99 % | BODY MASS INDEX: 28.85 KG/M2 | HEIGHT: 72 IN | WEIGHT: 213 LBS | RESPIRATION RATE: 18 BRPM | DIASTOLIC BLOOD PRESSURE: 70 MMHG

## 2021-05-31 DIAGNOSIS — K08.109 TOOTH LOSS: Primary | ICD-10-CM

## 2021-05-31 PROCEDURE — 74018 RADEX ABDOMEN 1 VIEW: CPT

## 2021-05-31 PROCEDURE — 99284 EMERGENCY DEPT VISIT MOD MDM: CPT

## 2021-05-31 PROCEDURE — 71045 X-RAY EXAM CHEST 1 VIEW: CPT

## 2021-05-31 ASSESSMENT — ENCOUNTER SYMPTOMS
BLOOD IN STOOL: 0
NAUSEA: 0
ABDOMINAL PAIN: 0
VOMITING: 0
SHORTNESS OF BREATH: 0

## 2021-05-31 NOTE — ED NOTES
Bed: B17-17  Expected date:   Expected time:   Means of arrival:   Comments:  Medic Tanja Dennis RN  05/31/21 [id number]

## 2021-06-01 NOTE — ED PROVIDER NOTES
Head: Normocephalic and atraumatic. Mouth/Throat:      Comments: Overall poor dentition with multiple pathologically fractured off teeth, no evidence of abscess or drainable fluid collection, submandibular space is soft, no stridor, managing secretions appropriately  Eyes:      Pupils: Pupils are equal, round, and reactive to light. Cardiovascular:      Rate and Rhythm: Normal rate and regular rhythm. Heart sounds: No murmur heard. Pulmonary:      Effort: Pulmonary effort is normal. No respiratory distress. Breath sounds: Normal breath sounds. No stridor. Abdominal:      General: There is no distension. Palpations: Abdomen is soft. Tenderness: There is no abdominal tenderness. Genitourinary:     Comments: Chronic indwelling Iglesias  Musculoskeletal:         General: Normal range of motion. Cervical back: Neck supple. Skin:     General: Skin is warm and dry. Capillary Refill: Capillary refill takes less than 2 seconds. Neurological:      Mental Status: He is alert and oriented to person, place, and time. Mental status is at baseline. Psychiatric:         Behavior: Behavior normal.         Diagnostic Results       RADIOLOGY:  XR CHEST PORTABLE   Final Result      1. Bilateral diffuse airspace disease and pleural fluid on the left. XR ABDOMEN (KUB) (SINGLE AP VIEW)   Final Result      1. Negative. LABS:   Labs Reviewed - No data to display      VITALS:  BP: 113/70, Temp: 98.1 °F (36.7 °C), Pulse: 72, Resp: 18     Procedures         ED Course     The patient was given the followingmedications:  No orders of the defined types were placed in this encounter. ED Course as of May 31 2009   Mon May 31, 2021   1701 XR CHEST PORTABLE [SC]      ED Course User Index  [SC] Gianna Mayen MD       CONSULTS:  None    MEDICAL DECISION MAKING     Makeda Rosales is a 79 y.o. male presenting with concerns that he swallowed a tooth.   There is no radiopaque foreign body seen on abdominal x-ray or chest x-ray. The patient is otherwise at his baseline state of health. He has some chronic findings that are seen on his chest x-ray that are demonstrated on multiple prior x-rays. He has reassuring vital signs, is afebrile and is not having any respiratory complaints at this time. Discharge instructions including strict return precautions were given to the patient. All questions were addressed. The patient verbalizes understanding and is in agreement with the plan. Clinical Impression     1.  Tooth loss        Susy Rickiemorris     PATIENT REFERRED TO:  Ronda Tran            DISCHARGE MEDICATIONS:  Discharge Medication List as of 5/31/2021  5:06 PM          DISPOSITION Decision To Discharge 05/31/2021 05:02:49 PM        Duarte Cheema MD  05/31/21 5634

## 2021-06-16 RX ORDER — FUROSEMIDE 40 MG/1
TABLET ORAL
Qty: 180 TABLET | Refills: 0 | Status: SHIPPED | OUTPATIENT
Start: 2021-06-16 | End: 2021-09-23

## 2021-06-16 NOTE — TELEPHONE ENCOUNTER
Requested Prescriptions     Pending Prescriptions Disp Refills    furosemide (LASIX) 40 MG tablet [Pharmacy Med Name: FUROSEMIDE 40 MG Tablet] 180 tablet 0     Sig: TAKE 1 TABLET TWICE DAILY                  Last Office Visit: 9/3/2020     Next Office Visit: Visit date not found

## 2021-06-19 ENCOUNTER — HOSPITAL ENCOUNTER (INPATIENT)
Age: 81
LOS: 5 days | Discharge: HOME HEALTH | DRG: 683 | End: 2021-06-24
Attending: STUDENT IN AN ORGANIZED HEALTH CARE EDUCATION/TRAINING PROGRAM | Admitting: INTERNAL MEDICINE
Payer: MEDICARE

## 2021-06-19 ENCOUNTER — APPOINTMENT (OUTPATIENT)
Dept: CT IMAGING | Age: 81
DRG: 683 | End: 2021-06-19
Payer: MEDICARE

## 2021-06-19 ENCOUNTER — APPOINTMENT (OUTPATIENT)
Dept: GENERAL RADIOLOGY | Age: 81
DRG: 683 | End: 2021-06-19
Payer: MEDICARE

## 2021-06-19 DIAGNOSIS — N17.9 AKI (ACUTE KIDNEY INJURY) (HCC): ICD-10-CM

## 2021-06-19 DIAGNOSIS — T83.511A URINARY TRACT INFECTION ASSOCIATED WITH INDWELLING URETHRAL CATHETER, INITIAL ENCOUNTER (HCC): ICD-10-CM

## 2021-06-19 DIAGNOSIS — N39.0 URINARY TRACT INFECTION ASSOCIATED WITH INDWELLING URETHRAL CATHETER, INITIAL ENCOUNTER (HCC): ICD-10-CM

## 2021-06-19 DIAGNOSIS — R42 DIZZINESS: Primary | ICD-10-CM

## 2021-06-19 LAB
A/G RATIO: 0.8 (ref 1.1–2.2)
ALBUMIN SERPL-MCNC: 3.9 G/DL (ref 3.4–5)
ALP BLD-CCNC: 75 U/L (ref 40–129)
ALT SERPL-CCNC: 10 U/L (ref 10–40)
AMORPHOUS: ABNORMAL /HPF
ANION GAP SERPL CALCULATED.3IONS-SCNC: 12 MMOL/L (ref 3–16)
AST SERPL-CCNC: 15 U/L (ref 15–37)
BASOPHILS ABSOLUTE: 0.1 K/UL (ref 0–0.2)
BASOPHILS RELATIVE PERCENT: 0.8 %
BILIRUB SERPL-MCNC: 0.5 MG/DL (ref 0–1)
BILIRUBIN URINE: NEGATIVE
BLOOD, URINE: ABNORMAL
BUN BLDV-MCNC: 44 MG/DL (ref 7–20)
CALCIUM SERPL-MCNC: 9 MG/DL (ref 8.3–10.6)
CHLORIDE BLD-SCNC: 104 MMOL/L (ref 99–110)
CLARITY: CLEAR
CO2: 29 MMOL/L (ref 21–32)
COLOR: YELLOW
CREAT SERPL-MCNC: 1.8 MG/DL (ref 0.8–1.3)
CRYSTALS, UA: ABNORMAL /HPF
EOSINOPHILS ABSOLUTE: 0.6 K/UL (ref 0–0.6)
EOSINOPHILS RELATIVE PERCENT: 8.7 %
EPITHELIAL CELLS, UA: ABNORMAL /HPF (ref 0–5)
GFR AFRICAN AMERICAN: 44
GFR NON-AFRICAN AMERICAN: 37
GLOBULIN: 4.7 G/DL
GLUCOSE BLD-MCNC: 118 MG/DL (ref 70–99)
GLUCOSE URINE: NEGATIVE MG/DL
HCT VFR BLD CALC: 34.5 % (ref 40.5–52.5)
HEMOGLOBIN: 11.2 G/DL (ref 13.5–17.5)
KETONES, URINE: NEGATIVE MG/DL
LEUKOCYTE ESTERASE, URINE: ABNORMAL
LYMPHOCYTES ABSOLUTE: 1 K/UL (ref 1–5.1)
LYMPHOCYTES RELATIVE PERCENT: 16.2 %
MCH RBC QN AUTO: 29.9 PG (ref 26–34)
MCHC RBC AUTO-ENTMCNC: 32.4 G/DL (ref 31–36)
MCV RBC AUTO: 92.4 FL (ref 80–100)
MICROSCOPIC EXAMINATION: YES
MONOCYTES ABSOLUTE: 0.5 K/UL (ref 0–1.3)
MONOCYTES RELATIVE PERCENT: 7.6 %
NEUTROPHILS ABSOLUTE: 4.2 K/UL (ref 1.7–7.7)
NEUTROPHILS RELATIVE PERCENT: 66.7 %
NITRITE, URINE: POSITIVE
PDW BLD-RTO: 18.2 % (ref 12.4–15.4)
PH UA: 8.5 (ref 5–8)
PLATELET # BLD: 157 K/UL (ref 135–450)
PMV BLD AUTO: 9.8 FL (ref 5–10.5)
POTASSIUM SERPL-SCNC: 3.8 MMOL/L (ref 3.5–5.1)
PROTEIN UA: 30 MG/DL
RBC # BLD: 3.73 M/UL (ref 4.2–5.9)
RBC UA: ABNORMAL /HPF (ref 0–4)
SODIUM BLD-SCNC: 145 MMOL/L (ref 136–145)
SPECIFIC GRAVITY UA: 1.01 (ref 1–1.03)
TOTAL CK: 93 U/L (ref 39–308)
TOTAL PROTEIN: 8.6 G/DL (ref 6.4–8.2)
TROPONIN: 0.05 NG/ML
TROPONIN: 0.06 NG/ML
URINE TYPE: ABNORMAL
UROBILINOGEN, URINE: 0.2 E.U./DL
WBC # BLD: 6.3 K/UL (ref 4–11)
WBC UA: ABNORMAL /HPF (ref 0–5)

## 2021-06-19 PROCEDURE — 71045 X-RAY EXAM CHEST 1 VIEW: CPT

## 2021-06-19 PROCEDURE — 6360000002 HC RX W HCPCS: Performed by: STUDENT IN AN ORGANIZED HEALTH CARE EDUCATION/TRAINING PROGRAM

## 2021-06-19 PROCEDURE — 83883 ASSAY NEPHELOMETRY NOT SPEC: CPT

## 2021-06-19 PROCEDURE — 70450 CT HEAD/BRAIN W/O DYE: CPT

## 2021-06-19 PROCEDURE — 85025 COMPLETE CBC W/AUTO DIFF WBC: CPT

## 2021-06-19 PROCEDURE — 82550 ASSAY OF CK (CPK): CPT

## 2021-06-19 PROCEDURE — 2580000003 HC RX 258: Performed by: STUDENT IN AN ORGANIZED HEALTH CARE EDUCATION/TRAINING PROGRAM

## 2021-06-19 PROCEDURE — 84165 PROTEIN E-PHORESIS SERUM: CPT

## 2021-06-19 PROCEDURE — 80053 COMPREHEN METABOLIC PANEL: CPT

## 2021-06-19 PROCEDURE — 6370000000 HC RX 637 (ALT 250 FOR IP): Performed by: STUDENT IN AN ORGANIZED HEALTH CARE EDUCATION/TRAINING PROGRAM

## 2021-06-19 PROCEDURE — 99285 EMERGENCY DEPT VISIT HI MDM: CPT

## 2021-06-19 PROCEDURE — 93005 ELECTROCARDIOGRAM TRACING: CPT | Performed by: STUDENT IN AN ORGANIZED HEALTH CARE EDUCATION/TRAINING PROGRAM

## 2021-06-19 PROCEDURE — 51702 INSERT TEMP BLADDER CATH: CPT

## 2021-06-19 PROCEDURE — 36415 COLL VENOUS BLD VENIPUNCTURE: CPT

## 2021-06-19 PROCEDURE — 1200000000 HC SEMI PRIVATE

## 2021-06-19 PROCEDURE — 81001 URINALYSIS AUTO W/SCOPE: CPT

## 2021-06-19 PROCEDURE — 84155 ASSAY OF PROTEIN SERUM: CPT

## 2021-06-19 PROCEDURE — 84484 ASSAY OF TROPONIN QUANT: CPT

## 2021-06-19 RX ORDER — SODIUM CHLORIDE 0.9 % (FLUSH) 0.9 %
5-40 SYRINGE (ML) INJECTION PRN
Status: DISCONTINUED | OUTPATIENT
Start: 2021-06-19 | End: 2021-06-24 | Stop reason: HOSPADM

## 2021-06-19 RX ORDER — ONDANSETRON 4 MG/1
4 TABLET, ORALLY DISINTEGRATING ORAL EVERY 8 HOURS PRN
Status: DISCONTINUED | OUTPATIENT
Start: 2021-06-19 | End: 2021-06-24 | Stop reason: HOSPADM

## 2021-06-19 RX ORDER — ASPIRIN 81 MG/1
81 TABLET, CHEWABLE ORAL DAILY
Status: DISCONTINUED | OUTPATIENT
Start: 2021-06-20 | End: 2021-06-24 | Stop reason: HOSPADM

## 2021-06-19 RX ORDER — SODIUM CHLORIDE 9 MG/ML
25 INJECTION, SOLUTION INTRAVENOUS PRN
Status: DISCONTINUED | OUTPATIENT
Start: 2021-06-19 | End: 2021-06-24 | Stop reason: HOSPADM

## 2021-06-19 RX ORDER — ONDANSETRON 2 MG/ML
4 INJECTION INTRAMUSCULAR; INTRAVENOUS EVERY 6 HOURS PRN
Status: DISCONTINUED | OUTPATIENT
Start: 2021-06-19 | End: 2021-06-24 | Stop reason: HOSPADM

## 2021-06-19 RX ORDER — SODIUM CHLORIDE 0.9 % (FLUSH) 0.9 %
5-40 SYRINGE (ML) INJECTION EVERY 12 HOURS SCHEDULED
Status: DISCONTINUED | OUTPATIENT
Start: 2021-06-19 | End: 2021-06-24 | Stop reason: HOSPADM

## 2021-06-19 RX ORDER — MECOBALAMIN 5000 MCG
10 TABLET,DISINTEGRATING ORAL NIGHTLY PRN
Status: DISCONTINUED | OUTPATIENT
Start: 2021-06-19 | End: 2021-06-24 | Stop reason: HOSPADM

## 2021-06-19 RX ORDER — HEPARIN SODIUM 5000 [USP'U]/ML
5000 INJECTION, SOLUTION INTRAVENOUS; SUBCUTANEOUS EVERY 8 HOURS SCHEDULED
Status: DISCONTINUED | OUTPATIENT
Start: 2021-06-19 | End: 2021-06-24 | Stop reason: HOSPADM

## 2021-06-19 RX ORDER — FUROSEMIDE 10 MG/ML
40 INJECTION INTRAMUSCULAR; INTRAVENOUS ONCE
Status: COMPLETED | OUTPATIENT
Start: 2021-06-20 | End: 2021-06-20

## 2021-06-19 RX ORDER — ATORVASTATIN CALCIUM 80 MG/1
80 TABLET, FILM COATED ORAL NIGHTLY
Status: DISCONTINUED | OUTPATIENT
Start: 2021-06-19 | End: 2021-06-19

## 2021-06-19 RX ORDER — GABAPENTIN 600 MG/1
600 TABLET ORAL 2 TIMES DAILY
COMMUNITY
Start: 2021-05-13

## 2021-06-19 RX ORDER — ATORVASTATIN CALCIUM 80 MG/1
80 TABLET, FILM COATED ORAL NIGHTLY
Status: DISCONTINUED | OUTPATIENT
Start: 2021-06-20 | End: 2021-06-24 | Stop reason: HOSPADM

## 2021-06-19 RX ORDER — ACETAMINOPHEN 325 MG/1
650 TABLET ORAL EVERY 6 HOURS PRN
Status: DISCONTINUED | OUTPATIENT
Start: 2021-06-19 | End: 2021-06-24 | Stop reason: HOSPADM

## 2021-06-19 RX ORDER — GABAPENTIN 400 MG/1
400 CAPSULE ORAL 2 TIMES DAILY
Status: DISCONTINUED | OUTPATIENT
Start: 2021-06-19 | End: 2021-06-24 | Stop reason: HOSPADM

## 2021-06-19 RX ORDER — ACETAMINOPHEN 650 MG/1
650 SUPPOSITORY RECTAL EVERY 6 HOURS PRN
Status: DISCONTINUED | OUTPATIENT
Start: 2021-06-19 | End: 2021-06-24 | Stop reason: HOSPADM

## 2021-06-19 RX ORDER — POLYETHYLENE GLYCOL 3350 17 G/17G
17 POWDER, FOR SOLUTION ORAL DAILY PRN
Status: DISCONTINUED | OUTPATIENT
Start: 2021-06-19 | End: 2021-06-24 | Stop reason: HOSPADM

## 2021-06-19 RX ORDER — PANTOPRAZOLE SODIUM 40 MG/1
40 TABLET, DELAYED RELEASE ORAL
Status: DISCONTINUED | OUTPATIENT
Start: 2021-06-20 | End: 2021-06-24 | Stop reason: HOSPADM

## 2021-06-19 RX ORDER — LANOLIN ALCOHOL/MO/W.PET/CERES
1000 CREAM (GRAM) TOPICAL DAILY
Status: DISCONTINUED | OUTPATIENT
Start: 2021-06-20 | End: 2021-06-24 | Stop reason: HOSPADM

## 2021-06-19 RX ORDER — TAMSULOSIN HYDROCHLORIDE 0.4 MG/1
0.8 CAPSULE ORAL DAILY
Status: DISCONTINUED | OUTPATIENT
Start: 2021-06-20 | End: 2021-06-24 | Stop reason: HOSPADM

## 2021-06-19 RX ADMIN — MEROPENEM 1000 MG: 1 INJECTION, POWDER, FOR SOLUTION INTRAVENOUS at 19:53

## 2021-06-19 RX ADMIN — Medication 10 ML: at 22:46

## 2021-06-19 RX ADMIN — GABAPENTIN 400 MG: 400 CAPSULE ORAL at 22:43

## 2021-06-19 RX ADMIN — HEPARIN SODIUM 5000 UNITS: 5000 INJECTION INTRAVENOUS; SUBCUTANEOUS at 22:43

## 2021-06-19 ASSESSMENT — ENCOUNTER SYMPTOMS
DIARRHEA: 0
CHEST TIGHTNESS: 0
VOICE CHANGE: 0
VOMITING: 0
WHEEZING: 0
SHORTNESS OF BREATH: 0
APNEA: 0
STRIDOR: 0
TROUBLE SWALLOWING: 0
COUGH: 0
ABDOMINAL DISTENTION: 0
PHOTOPHOBIA: 0
NAUSEA: 0
CONSTIPATION: 0
CHOKING: 0
ABDOMINAL PAIN: 0

## 2021-06-19 ASSESSMENT — PAIN SCALES - GENERAL: PAINLEVEL_OUTOF10: 0

## 2021-06-19 NOTE — ED PROVIDER NOTES
Date of evaluation: 6/19/2021    Chief Complaint   Dizziness (HAD EPISODE OF DIZZINESS EALIER TODAY, NOT SURE HOW LONG IT LASTED)      Nursing Notes, Past Medical Hx, Past Surgical Hx, Social Hx, Allergies, and Family Hx were reviewed. History of Present Illness     Efe Simpson is a 79 y.o. male with a history of BPH and indwelling catheter, recurrent UTIs, CAD s/p CABG, A. fib, HTN, HLD, CKD3, who presented with a brief episode of dizziness earlier this afternoon. The patient reports that this morning he woke up feeling fine, however at his son this afternoon he has an episode of feeling very \"dizzy\" while he was sitting up on his wheelchair. He is a very poor historian. He was not sure how long the episode last, could have been minutes. He never had an episode like this before in the past and decided to call EMS. He denies any nausea, vomiting, significant headache, chest pain, palpitation, abdominal pain, or urinary symptoms. Of note, the patient is a chronically ill gentleman who lives alone by himself. He notes that he recently discharged from a SNF several weeks ago, has been waiting for home visiting nurse service to come to his house later this evening. He has a daughter who lives about 5 miles away. However he reports he does all of his ADL and take care of medications by himself. Review of Systems     Review of Systems   Constitutional: Negative for activity change, appetite change, chills, diaphoresis, fatigue, fever and unexpected weight change. HENT: Negative for trouble swallowing and voice change. Eyes: Negative for photophobia and visual disturbance. Respiratory: Negative for apnea, cough, choking, chest tightness, shortness of breath, wheezing and stridor. Cardiovascular: Negative for chest pain, palpitations and leg swelling. Gastrointestinal: Negative for abdominal distention, abdominal pain, constipation, diarrhea, nausea and vomiting.    Genitourinary: Negative by mouth nightly. BALSAM PERU-CASTOR OIL (VENELEX) OINT OINTMENT    Apply topically 2 times daily    DICYCLOMINE (BENTYL) 20 MG TABLET    Take 20 mg by mouth 3 times daily as needed    DOCUSATE SODIUM (COLACE) 100 MG CAPSULE    Take 1 capsule by mouth 2 times daily    FUROSEMIDE (LASIX) 40 MG TABLET    TAKE 1 TABLET TWICE DAILY    GABAPENTIN (NEURONTIN) 600 MG TABLET    Take 600 mg by mouth 2 times daily. MELATONIN 10 MG TABS    Take 10 mg by mouth nightly as needed     PANTOPRAZOLE (PROTONIX) 40 MG TABLET    Take 1 tablet by mouth every morning (before breakfast)    TAMSULOSIN (FLOMAX) 0.4 MG CAPSULE    Take 0.8 mg by mouth daily     VITAMIN B-12 (CYANOCOBALAMIN) 1000 MCG TABLET    Take 1,000 mcg by mouth daily    ZOLPIDEM (AMBIEN) 10 MG TABLET    Take 10 mg by mouth nightly as needed for Sleep. Allergies     He is allergic to bactrim [sulfamethoxazole-trimethoprim]. Physical Exam     INITIAL VITALS: BP (!) 144/53   Pulse 58   Temp 97.5 °F (36.4 °C) (Oral)   Resp 12   Ht 6' 0.01\" (1.829 m)   Wt 206 lb (93.4 kg)   SpO2 93%   BMI 27.93 kg/m²    Physical Exam  Vitals and nursing note reviewed. Constitutional:       General: He is not in acute distress. Appearance: Normal appearance. He is well-developed. He is ill-appearing. He is not toxic-appearing or diaphoretic. Comments: Chronically appearing, disheveled    HENT:      Head: Normocephalic and atraumatic. Eyes:      General: No scleral icterus. Extraocular Movements: Extraocular movements intact. Conjunctiva/sclera: Conjunctivae normal.      Pupils: Pupils are equal, round, and reactive to light. Comments: Horizontal nystagmus presents   Cardiovascular:      Rate and Rhythm: Normal rate and regular rhythm. Pulses: Normal pulses. Heart sounds: Normal heart sounds. No murmur heard. No friction rub. No gallop. Pulmonary:      Effort: Pulmonary effort is normal. No respiratory distress.       Breath sounds: Normal breath sounds. No wheezing or rales. Chest:      Chest wall: No tenderness. Abdominal:      General: Bowel sounds are normal. There is no distension. Palpations: Abdomen is soft. Tenderness: There is no abdominal tenderness. There is no guarding. Comments: Hepatomegaly appreciated with liver edge down to the right lower quadrant   Musculoskeletal:         General: Swelling present. No tenderness or deformity. Normal range of motion. Cervical back: Normal range of motion and neck supple. Right lower leg: Edema present. Left lower leg: Edema present. Comments: +3 peripheral pitting edema bilateral lower extremities   Skin:     General: Skin is warm and dry. Neurological:      General: No focal deficit present. Mental Status: He is alert and oriented to person, place, and time. Mental status is at baseline. Cranial Nerves: No cranial nerve deficit. Sensory: No sensory deficit. Motor: No weakness. Psychiatric:         Behavior: Behavior normal.           Diagnostic Results     EKG   Interpreted by emergency department physician Araceli Disla MD  Rhythm: normal sinus   Rate: normal  Axis: left  Ectopy: none  Conduction: 1st degree AV block  ST Segments: no acute change  T Waves: no acute change  Q Waves: none  Clinical Impression: no acute changes  Comparison:  4/22/2021    RADIOLOGY:  CT Head WO Contrast   Final Result   1. Brain atrophy, Otherwise normal          LABS:   Labs Reviewed   CBC WITH AUTO DIFFERENTIAL - Abnormal; Notable for the following components:       Result Value    RBC 3.73 (*)     Hemoglobin 11.2 (*)     Hematocrit 34.5 (*)     RDW 18.2 (*)     All other components within normal limits    Narrative:     Performed at:   [facility]  [address]   Phone [phone]   COMPREHENSIVE METABOLIC PANEL - Abnormal; Notable for the following components:    Glucose results and have not been treated with any antibiotics. He does note that he has been indwelling Iglesias catheter dependent for at least a year. According to last urology note on [facility] in 2020, it was suspected that it was due to neurogenic etiology. Has been followed by urology. Last appointment with urology was sometime last week. He self reports that last Iglesias catheter exchange was about 3 weeks ago. Apparently there is a plan for suprapubic catheter placement in the near future. With this information, patient will be started on Merrem. His labs also shows evidence of ALIS, creatinine 1.8, most likely reflecting prerenal picture. However patient's grossly volume overload, will be conservative with IV fluid at this time. His benefit with inpatient admission were discussed the patient case withfor IV antibiotics. Will discuss the patient's case with the admitting hospitalist.        This patient was also evaluated by the attending physician. All care plans were discussed and agreed upon. Clinical Impression     1. Dizziness    2. Urinary tract infection associated with indwelling urethral catheter, initial encounter ([facility] [address]    3. ALIS (acute kidney injury) ([facility]        Disposition/Plan     PATIENT REFERRED TO:  No follow-up provider specified.     DISCHARGE MEDICATIONS:  New Prescriptions    No medications on file       DISPOSITION inpatient admit         Juancarlos Carrasco MD  Resident  06/19/21 Echo Waller MD  Resident  06/19/21 1936       Juancarlos Carrasco MD  Resident  06/19/21 [id number]       Juancarlos Carrasco MD  Resident  06/19/21 1947       Juancarlos Carrasco MD  Resident  06/19/21 2039

## 2021-06-19 NOTE — ED NOTES
Bed: A12-12  Expected date:   Expected time:   Means of arrival:   Comments:  [address] Rafita, RN  06/19/21 6515

## 2021-06-19 NOTE — ED TRIAGE NOTES
Patient brought in by EMS for complaints of an episode of dizziness earlier today. Patient denies any dizziness now. Patient has 4+ edema to BL legs. He was placed on monitor. VS obtained. MD was in to see patient. Patient arrived with indwelling catheter.

## 2021-06-19 NOTE — H&P
Internal Medicine  PGY 2  History & Physical      CC dizziness    History Obtained From:  patient    HISTORY OF PRESENT ILLNESS:    78 yo M with past medical history of indwelling urinary catheter, CAD status post CABG, hypertension, CKD stage III, hyperlipidemia who presents from home with an episode of dizziness. Patient reports he was lying down at home when he had a episode of lightheadedness that lasted a few seconds but resolved on its own. He did not have any weakness, numbness or tingling, vision changes, speech difficulty or any other symptoms that have been with the dizziness but it severely made him come to the hospital.  He did not have any recurrence of the dizziness and he reports that he has episodes of mild dizziness every now and then but not as severe as this episode. On my exam he denies any headache, chest pain, palpitations, abdominal pain, nausea vomiting, fevers chills, shortness of breath. He reports good appetite. Has had no pain change in p.o. intake over the last few days. He reports this severe lower extremity swelling which is gotten worse in the last 2 to 3 months despite taking his Lasix. He reports compression stockings help somewhat. He reports he has 1-2 stool accidents daily for the past few months, and that they are loose. Patient reports he lives at home by himself, ambulates in a wheelchair due to weakness. He reports he is out of Iglesias since 2019 because of \"weak bladder\", and that he gets it changed by his home health nurse. Last time he was exchanged was about 3 weeks ago. Over the last day has noticed that his urine has been a little more cloudy. He approximately changes 2 to 3 times daily and it has approximately 500 treatment under milliliters of urine when he does change it.   He reports there was a plan to place a suprapubic catheter by the urologist.      Past Medical History:        Diagnosis Date    BPH (benign prostatic hyperplasia)     Carotid stenosis 12/2013    JESU 75-50% stenosis; LICA 79-20% stenosis    Cellulitis 12/2013    LLE    Chronic back pain     Diverticular disease     Erectile dysfunction     GERD (gastroesophageal reflux disease)     History of atrial fibrillation     Hypercholesteremia     Hypertension     Lower GI bleed     MDRO (multiple drug resistant organisms) resistance 10/26/2019    urine    Neuromuscular disorder (HCC)     spasticity    Renal insufficiency     Risk for falls     uses walker    Tinea corporis 12/2013    LLE    Vitamin B12 deficiency    ·     Past Surgical History:        Procedure Laterality Date    BACK SURGERY  2006    lower lumbar    CORONARY ARTERY BYPASS GRAFT  12/13/2013    CABG x 5 (Dr Joe Leong), svg to diag, om1 and 3, distal rca, kelly to lad.  CYSTOSCOPY N/A 1/10/2019    CYSTOSCOPY performed by Maribel Perea MD at [facility] [address] 5/1/2015    ORI    SIGMOIDOSCOPY N/A 6/11/2020    SIGMOIDOSCOPY DIAGNOSTIC FLEXIBLE performed by Moncho Castanon MD at [address] 5/4/2020    EGD BIOPSY performed by Moncho Castanon MD at [place] [address]   ·     Medications Priorto Admission:    · Not in a hospital admission. Allergies:  Bactrim [sulfamethoxazole-trimethoprim]    Social History:   · TOBACCO:   reports that he quit smoking about 51 years ago. He has never used smokeless tobacco.  · ETOH:   reports no history of alcohol use. · DRUGS : Denies illicit drug use  · Patient currently lives in a house by himself. Ambulates using wheelchair. Has a home health nurse that was several times a week. ·   Family History:       Problem Relation Age of Onset    Heart Disease Father    ·     Review of Systems    ROS: A 10 point review of systems was conducted, significant findings as noted in HPI. Physical Exam  Constitutional:       General: He is not in acute distress.   HENT:      Mouth/Throat:      Mouth: Mucous membranes are moist.   Eyes:      General: No scleral icterus. Extraocular Movements: Extraocular movements intact. Cardiovascular:      Rate and Rhythm: Normal rate and regular rhythm. Heart sounds: No murmur heard. Pulmonary:      Effort: Pulmonary effort is normal.      Breath sounds: No wheezing or rales. Abdominal:      Palpations: Abdomen is soft. Tenderness: There is no abdominal tenderness. Comments: Hepatomegaly   Musculoskeletal:      Cervical back: Normal range of motion. Right lower leg: Edema present. Left lower leg: Edema present. Comments: 3+ pitting edema up to knees   Neurological:      General: No focal deficit present. Mental Status: He is alert and oriented to person, place, and time. Sensory: No sensory deficit. Motor: No weakness. Psychiatric:         Mood and Affect: Mood normal.       Physical exam:       Vitals:    06/19/21 1930   BP:    Pulse: 58   Resp: 12   Temp:    SpO2:        DATA:    Labs:  CBC:   Recent Labs     06/19/21  1808   WBC 6.3   HGB 11.2*   HCT 34.5*          BMP:   Recent Labs     06/19/21  1808      K 3.8      CO2 29   BUN 44*   CREATININE 1.8*   GLUCOSE 118*     LFT's:   Recent Labs     06/19/21  1808   AST 15   ALT 10   BILITOT 0.5   ALKPHOS 75     Troponin:   Recent Labs     06/19/21  1808   TROPONINI 0.06*     BNP:No results for input(s): BNP in the last 72 hours. ABGs: No results for input(s): PHART, DBU8SLI, PO2ART in the last 72 hours. INR: No results for input(s): INR in the last 72 hours. U/A:  Recent Labs     06/19/21 1958   COLORU Yellow   PHUR 8.5*   CLARITYU Clear   SPECGRAV 1.010   LEUKOCYTESUR MODERATE*   UROBILINOGEN 0.2   BILIRUBINUR Negative   BLOODU TRACE-INTACT*   GLUCOSEU Negative       CT Head WO Contrast   Final Result   1.  Brain atrophy, Otherwise normal      US GALLBLADDER RUQ    (Results Pending)           ASSESSMENT AND PLAN:    Acute Cystitis without hematuria.   -He had a analysis drawn by PCP on 6/5. Noted to have 159 WBCs, urine culture positive for MDRO Proteus.  -Started on meropenem. Sent for repeat urine culture today. -We will exchange his Iglesias catheter. ALIS. -Patient has history of CKD stage III. His baseline is around 1.2 last around 6/5/2021. Today is 1.8. No history of poor p.o. intake. He has to 3+ pitting edema on exam.  We will refrain from giving IV fluids at this point pending urine studies. -We will obtain urine studies including urine sodium, creatinine and urea as he is on Lasix. Follow-up CK. -He has decreased albumin to globulin ratio the setting of a normal albumin level. In the setting of recurrent ALIS. We will send SPE,P UPEP, kappa/ lambda. -IV Lasix 40 mg once. LE Edema  -He is wheelchair-bound. He does not have any shortness of breath or oxygen requirement low suspicion for heart failure at this time. His last echo from 1/2020 was normal EF with normal diastolic function. Suspect dependent edema at this time.   -We will use compression bandages.  -Hold home amlodipine. Troponinemia  -No chest pain or shortness of breath. EKG unchanged from baseline. He has a first-degree heart block which is chronic. Troponin significantly lower than his previous admissions. In the setting of ALIS. We will trend. Dizziness  -Unclear etiology. Self-limited episode.   -Does have a significant cardiac history. Will monitor on telemetry. CAD status post CABG  -Continue home aspirin statin. Chronic indwelling Iglesias catheter.  -We will exchange it in the setting of acute cystitis. Encourage outpatient follow-up with urology for further management including possible suprapubic catheter.      Will discuss with attending physician Dr. Reva Purvis Status:Full code  FEN: General with low potassium  PPX: Heparin  DISPO: Bertram Villafana MD  6/19/2021,  9:01 PM

## 2021-06-19 NOTE — ED PROVIDER NOTES
ED Attending Attestation Note     Date of evaluation: 6/19/2021    This patient was seen by the resident. I have seen and examined the patient, agree with the workup, evaluation, management and diagnosis. The care plan has been discussed. I have reviewed the ECG and concur with the resident's interpretation. My assessment reveals 80 y/o M with hx of BPH w/ indwelling schneider, CAD, CKD, HTN, A fib presents for lightheadedness and presyncopal symptoms earlier today. Of note, he had a recent culture showing MDRO but has not been started on abx. Denies chest pain, sob, abd pain, headache, vision change, or extremity weakness. VSS and afebrile, BLE 3+ pitting edema, breath sounds clear. Labs notable for ALIS Cr 1.8 from 1.2, troponin 0.06. Will admit for abx and IV fluid hydration.      Himanshu Benavidez MD  06/19/21 1950

## 2021-06-20 ENCOUNTER — APPOINTMENT (OUTPATIENT)
Dept: CT IMAGING | Age: 81
DRG: 683 | End: 2021-06-20
Payer: MEDICARE

## 2021-06-20 LAB
ALBUMIN SERPL-MCNC: 3.2 G/DL (ref 3.4–5)
ALP BLD-CCNC: 65 U/L (ref 40–129)
ALT SERPL-CCNC: 9 U/L (ref 10–40)
ANION GAP SERPL CALCULATED.3IONS-SCNC: 11 MMOL/L (ref 3–16)
AST SERPL-CCNC: 13 U/L (ref 15–37)
BASOPHILS ABSOLUTE: 0.1 K/UL (ref 0–0.2)
BASOPHILS RELATIVE PERCENT: 1 %
BILIRUB SERPL-MCNC: 0.4 MG/DL (ref 0–1)
BILIRUBIN DIRECT: <0.2 MG/DL (ref 0–0.3)
BILIRUBIN, INDIRECT: ABNORMAL MG/DL (ref 0–1)
BUN BLDV-MCNC: 38 MG/DL (ref 7–20)
CALCIUM SERPL-MCNC: 8.5 MG/DL (ref 8.3–10.6)
CHLORIDE BLD-SCNC: 103 MMOL/L (ref 99–110)
CO2: 28 MMOL/L (ref 21–32)
CREAT SERPL-MCNC: 1.6 MG/DL (ref 0.8–1.3)
CREATININE URINE: 63.6 MG/DL (ref 39–259)
EKG DIAGNOSIS: NORMAL
EKG Q-T INTERVAL: 458 MS
EKG QRS DURATION: 100 MS
EKG QTC CALCULATION (BAZETT): 445 MS
EKG R AXIS: 16 DEGREES
EKG T AXIS: 38 DEGREES
EKG VENTRICULAR RATE: 57 BPM
EOSINOPHILS ABSOLUTE: 0.6 K/UL (ref 0–0.6)
EOSINOPHILS RELATIVE PERCENT: 10 %
GFR AFRICAN AMERICAN: 51
GFR NON-AFRICAN AMERICAN: 42
GLUCOSE BLD-MCNC: 113 MG/DL (ref 70–99)
GLUCOSE BLD-MCNC: 118 MG/DL (ref 70–99)
GLUCOSE BLD-MCNC: 131 MG/DL (ref 70–99)
GLUCOSE BLD-MCNC: 148 MG/DL (ref 70–99)
GLUCOSE BLD-MCNC: 201 MG/DL (ref 70–99)
GLUCOSE BLD-MCNC: 337 MG/DL (ref 70–99)
HCT VFR BLD CALC: 31.9 % (ref 40.5–52.5)
HEMOGLOBIN: 10.4 G/DL (ref 13.5–17.5)
LACTIC ACID: 0.9 MMOL/L (ref 0.4–2)
LYMPHOCYTES ABSOLUTE: 1.2 K/UL (ref 1–5.1)
LYMPHOCYTES RELATIVE PERCENT: 21.4 %
MCH RBC QN AUTO: 30 PG (ref 26–34)
MCHC RBC AUTO-ENTMCNC: 32.7 G/DL (ref 31–36)
MCV RBC AUTO: 91.8 FL (ref 80–100)
MONOCYTES ABSOLUTE: 0.4 K/UL (ref 0–1.3)
MONOCYTES RELATIVE PERCENT: 7 %
NEUTROPHILS ABSOLUTE: 3.4 K/UL (ref 1.7–7.7)
NEUTROPHILS RELATIVE PERCENT: 60.6 %
PDW BLD-RTO: 17.5 % (ref 12.4–15.4)
PERFORMED ON: ABNORMAL
PLATELET # BLD: 136 K/UL (ref 135–450)
PMV BLD AUTO: 9 FL (ref 5–10.5)
POTASSIUM REFLEX MAGNESIUM: 3.7 MMOL/L (ref 3.5–5.1)
PRO-BNP: 387 PG/ML (ref 0–449)
PROTEIN PROTEIN: 0.07 G/DL
PROTEIN PROTEIN: 69 MG/DL
RBC # BLD: 3.47 M/UL (ref 4.2–5.9)
SODIUM BLD-SCNC: 142 MMOL/L (ref 136–145)
SODIUM URINE: 90 MMOL/L
TOTAL PROTEIN: 7.3 G/DL (ref 6.4–8.2)
TROPONIN: 0.05 NG/ML
TROPONIN: 0.05 NG/ML
UREA NITROGEN, UR: 384.2 MG/DL (ref 800–1666)
WBC # BLD: 5.5 K/UL (ref 4–11)

## 2021-06-20 PROCEDURE — 6370000000 HC RX 637 (ALT 250 FOR IP): Performed by: INTERNAL MEDICINE

## 2021-06-20 PROCEDURE — 6360000002 HC RX W HCPCS: Performed by: STUDENT IN AN ORGANIZED HEALTH CARE EDUCATION/TRAINING PROGRAM

## 2021-06-20 PROCEDURE — 80048 BASIC METABOLIC PNL TOTAL CA: CPT

## 2021-06-20 PROCEDURE — 6370000000 HC RX 637 (ALT 250 FOR IP): Performed by: STUDENT IN AN ORGANIZED HEALTH CARE EDUCATION/TRAINING PROGRAM

## 2021-06-20 PROCEDURE — 87086 URINE CULTURE/COLONY COUNT: CPT

## 2021-06-20 PROCEDURE — 85025 COMPLETE CBC W/AUTO DIFF WBC: CPT

## 2021-06-20 PROCEDURE — 74176 CT ABD & PELVIS W/O CONTRAST: CPT

## 2021-06-20 PROCEDURE — 84300 ASSAY OF URINE SODIUM: CPT

## 2021-06-20 PROCEDURE — 82570 ASSAY OF URINE CREATININE: CPT

## 2021-06-20 PROCEDURE — 84540 ASSAY OF URINE/UREA-N: CPT

## 2021-06-20 PROCEDURE — 83605 ASSAY OF LACTIC ACID: CPT

## 2021-06-20 PROCEDURE — 83880 ASSAY OF NATRIURETIC PEPTIDE: CPT

## 2021-06-20 PROCEDURE — 84484 ASSAY OF TROPONIN QUANT: CPT

## 2021-06-20 PROCEDURE — 2580000003 HC RX 258: Performed by: STUDENT IN AN ORGANIZED HEALTH CARE EDUCATION/TRAINING PROGRAM

## 2021-06-20 PROCEDURE — 36415 COLL VENOUS BLD VENIPUNCTURE: CPT

## 2021-06-20 PROCEDURE — 83036 HEMOGLOBIN GLYCOSYLATED A1C: CPT

## 2021-06-20 PROCEDURE — 84166 PROTEIN E-PHORESIS/URINE/CSF: CPT

## 2021-06-20 PROCEDURE — 1200000000 HC SEMI PRIVATE

## 2021-06-20 PROCEDURE — 80076 HEPATIC FUNCTION PANEL: CPT

## 2021-06-20 PROCEDURE — 84156 ASSAY OF PROTEIN URINE: CPT

## 2021-06-20 RX ORDER — INSULIN LISPRO 100 [IU]/ML
0-6 INJECTION, SOLUTION INTRAVENOUS; SUBCUTANEOUS
Status: DISCONTINUED | OUTPATIENT
Start: 2021-06-20 | End: 2021-06-21

## 2021-06-20 RX ORDER — ZOLPIDEM TARTRATE 5 MG/1
10 TABLET ORAL NIGHTLY PRN
Status: DISCONTINUED | OUTPATIENT
Start: 2021-06-20 | End: 2021-06-24 | Stop reason: HOSPADM

## 2021-06-20 RX ORDER — DEXTROSE MONOHYDRATE 50 MG/ML
100 INJECTION, SOLUTION INTRAVENOUS PRN
Status: DISCONTINUED | OUTPATIENT
Start: 2021-06-20 | End: 2021-06-24 | Stop reason: HOSPADM

## 2021-06-20 RX ORDER — SODIUM CHLORIDE, SODIUM LACTATE, POTASSIUM CHLORIDE, AND CALCIUM CHLORIDE .6; .31; .03; .02 G/100ML; G/100ML; G/100ML; G/100ML
500 INJECTION, SOLUTION INTRAVENOUS ONCE
Status: DISCONTINUED | OUTPATIENT
Start: 2021-06-20 | End: 2021-06-20

## 2021-06-20 RX ORDER — NICOTINE POLACRILEX 4 MG
15 LOZENGE BUCCAL PRN
Status: DISCONTINUED | OUTPATIENT
Start: 2021-06-20 | End: 2021-06-24 | Stop reason: HOSPADM

## 2021-06-20 RX ORDER — DOCUSATE SODIUM 100 MG/1
100 CAPSULE, LIQUID FILLED ORAL DAILY
COMMUNITY
End: 2022-08-30 | Stop reason: CLARIF

## 2021-06-20 RX ORDER — INSULIN LISPRO 100 [IU]/ML
0-3 INJECTION, SOLUTION INTRAVENOUS; SUBCUTANEOUS NIGHTLY
Status: DISCONTINUED | OUTPATIENT
Start: 2021-06-20 | End: 2021-06-24 | Stop reason: HOSPADM

## 2021-06-20 RX ORDER — CASTOR OIL AND BALSAM, PERU 788; 87 MG/G; MG/G
OINTMENT TOPICAL DAILY PRN
COMMUNITY

## 2021-06-20 RX ORDER — DEXTROSE MONOHYDRATE 25 G/50ML
12.5 INJECTION, SOLUTION INTRAVENOUS PRN
Status: DISCONTINUED | OUTPATIENT
Start: 2021-06-20 | End: 2021-06-24 | Stop reason: HOSPADM

## 2021-06-20 RX ORDER — SODIUM CHLORIDE, SODIUM LACTATE, POTASSIUM CHLORIDE, AND CALCIUM CHLORIDE .6; .31; .03; .02 G/100ML; G/100ML; G/100ML; G/100ML
500 INJECTION, SOLUTION INTRAVENOUS ONCE
Status: COMPLETED | OUTPATIENT
Start: 2021-06-20 | End: 2021-06-20

## 2021-06-20 RX ADMIN — FUROSEMIDE 40 MG: 10 INJECTION, SOLUTION INTRAMUSCULAR; INTRAVENOUS at 00:28

## 2021-06-20 RX ADMIN — CYANOCOBALAMIN TAB 1000 MCG 1000 MCG: 1000 TAB at 09:47

## 2021-06-20 RX ADMIN — GABAPENTIN 400 MG: 400 CAPSULE ORAL at 09:47

## 2021-06-20 RX ADMIN — PANTOPRAZOLE SODIUM 40 MG: 40 TABLET, DELAYED RELEASE ORAL at 05:50

## 2021-06-20 RX ADMIN — ATORVASTATIN CALCIUM 80 MG: 80 TABLET, FILM COATED ORAL at 21:40

## 2021-06-20 RX ADMIN — TAMSULOSIN HYDROCHLORIDE 0.8 MG: 0.4 CAPSULE ORAL at 09:47

## 2021-06-20 RX ADMIN — ASPIRIN 81 MG: 81 TABLET, CHEWABLE ORAL at 09:47

## 2021-06-20 RX ADMIN — MEROPENEM 1000 MG: 1 INJECTION, POWDER, FOR SOLUTION INTRAVENOUS at 07:00

## 2021-06-20 RX ADMIN — HEPARIN SODIUM 5000 UNITS: 5000 INJECTION INTRAVENOUS; SUBCUTANEOUS at 05:51

## 2021-06-20 RX ADMIN — Medication 10 ML: at 19:42

## 2021-06-20 RX ADMIN — Medication 10 MG: at 00:27

## 2021-06-20 RX ADMIN — Medication 10 ML: at 21:41

## 2021-06-20 RX ADMIN — Medication 10 MG: at 21:40

## 2021-06-20 RX ADMIN — SODIUM CHLORIDE, POTASSIUM CHLORIDE, SODIUM LACTATE AND CALCIUM CHLORIDE 500 ML: 600; 310; 30; 20 INJECTION, SOLUTION INTRAVENOUS at 12:15

## 2021-06-20 RX ADMIN — Medication 10 ML: at 09:47

## 2021-06-20 RX ADMIN — ZOLPIDEM TARTRATE 10 MG: 5 TABLET ORAL at 00:28

## 2021-06-20 RX ADMIN — ZOLPIDEM TARTRATE 10 MG: 5 TABLET ORAL at 21:40

## 2021-06-20 RX ADMIN — HEPARIN SODIUM 5000 UNITS: 5000 INJECTION INTRAVENOUS; SUBCUTANEOUS at 14:16

## 2021-06-20 RX ADMIN — HEPARIN SODIUM 5000 UNITS: 5000 INJECTION INTRAVENOUS; SUBCUTANEOUS at 21:48

## 2021-06-20 RX ADMIN — MEROPENEM 1000 MG: 1 INJECTION, POWDER, FOR SOLUTION INTRAVENOUS at 19:40

## 2021-06-20 RX ADMIN — GABAPENTIN 400 MG: 400 CAPSULE ORAL at 21:40

## 2021-06-20 ASSESSMENT — PAIN SCALES - GENERAL
PAINLEVEL_OUTOF10: 0

## 2021-06-20 NOTE — CONSULTS
Clinical Pharmacy Progress Note  Medication History     Admit Date: 6/19/21    List of of current medications patient is taking is complete. Please note:   · Last prescription filled for gabapentin was on 3/9/21 x90 days and was for a dose of 600 mg QID   · Last prescription written for gabapentin was on 5/13/21 x12 days and was for a dose of 600 mg BID  · This dose is currently listed on the med list, will not change as this is the most recently prescribed dose  · Max recommended dose of gabapentin for CrCL 30-49 mL/min is 900 mg/day      Alba Israel, PharmD  PGY1 Pharmacy Resident  6/20/2021 1:40 PM  [id number]    ation List:  No current facility-administered medications on file prior to encounter. Current Outpatient Medications on File Prior to Encounter   Medication Sig Dispense Refill    Balsam Peru-Castor Oil (VENELEX) OINT ointment Apply topically daily as needed      docusate sodium (COLACE) 100 MG capsule Take 100 mg by mouth daily as needed for Constipation      furosemide (LASIX) 40 MG tablet TAKE 1 TABLET TWICE DAILY 180 tablet 0    amLODIPine (NORVASC) 5 MG tablet Take 1 tablet by mouth daily 30 tablet 3    dicyclomine (BENTYL) 20 MG tablet Take 20 mg by mouth 3 times daily as needed      vitamin B-12 (CYANOCOBALAMIN) 1000 MCG tablet Take 1,000 mcg by mouth daily      aspirin 81 MG chewable tablet Take 1 tablet by mouth daily 30 tablet 3    pantoprazole (PROTONIX) 40 MG tablet Take 1 tablet by mouth every morning (before breakfast) 30 tablet 3    tamsulosin (FLOMAX) 0.4 MG capsule Take 0.8 mg by mouth daily       Melatonin 10 MG TABS Take 10 mg by mouth nightly as needed       atorvastatin (LIPITOR) 80 MG tablet Take 80 mg by mouth nightly.  zolpidem (AMBIEN) 10 MG tablet Take 10 mg by mouth nightly as needed for Sleep.  gabapentin (NEURONTIN) 600 MG tablet Take 600 mg by mouth 2 times daily.       [DISCONTINUED] docusate sodium (COLACE) 100 MG capsule Take 1 capsule by mouth 2 times daily (Patient taking differently: Take 100 mg by mouth daily as needed ) 30 capsule 0    [DISCONTINUED] Balsam Peru-Castor Oil (VENELEX) OINT ointment Apply topically 2 times daily (Patient taking differently: Apply topically daily ) 2 Tube 0

## 2021-06-20 NOTE — PROGRESS NOTES
4 Eyes Admission Assessment   I agree as the admission nurse that 2 RN's have performed a thorough Head to Toe Skin Assessment on the patient. ALL assessment sites listed below have been assessed on admission. Areas assessed by both nurses:  [x]   Head, Face, and Ears   [x]   Shoulders, Back, and Chest  [x]   Arms, Elbows, and Hands   [x]   Coccyx, Sacrum, and Ischium  [x]   Legs, Feet, and Heels        Does the Patient have Skin Breakdown?   Yes a wound was noted on the Admission Assessment and an LDA was Initiated documentation include the Usha-wound, Wound Assessment, Measurements, Dressing Treatment, Drainage, and Color\",         Terrance Prevention initiated:  Yes   Wound Care Orders initiated:  Yes      WOC nurse consulted for Pressure Injury (Stage 3,4, Unstageable, DTI, NWPT, and Complex wounds) or Terrance score 18 or lower:  Yes      Nurse 1 eSignature: Electronically signed by Radha Ocampo RN on 6/19/21 at 9:55 PM EDT    **SHARE this note so that the co-signing nurse is able to place an eSignature**    Nurse 2 eSignature: Electronically signed by Jefry Cheek RN on 6/20/21 at 11:52 AM EDT

## 2021-06-20 NOTE — PLAN OF CARE
Problem: Falls - Risk of:  Goal: Will remain free from falls  Description: Will remain free from falls  Outcome: Ongoing  Note: Pt demonstrates ability to call appropriately for assistance; does not attempt to get out of bed. Safety parameters in place. No falls or injuries. Problem: Skin Integrity:  Goal: Will show no infection signs and symptoms  Description: Will show no infection signs and symptoms  Outcome: Ongoing  Note: BLE's are swollen and red. Pt has many small abrasions, skin tears, and scabs or scars from prior wounds. Skin does not appear to be infected, however. He is afebrile with a WBC count WNL. Problem: Skin Integrity:  Goal: Absence of new skin breakdown  Description: Absence of new skin breakdown  Outcome: Ongoing  Note: Pt appears to have some new areas of skin breakdown that have formed since prior admission. See flowsheet/ AVATAR.

## 2021-06-20 NOTE — PROGRESS NOTES
Pt arrived to room 6311 from ED in stable condition and transferred from stretcher to bed with max assist of 2. Vital signs stable; oriented pt to room and unit routines. Call light placed in lap and explained. Pt is filthy with dried stool on buttocks, legs, and abdomen. Will clean up and do skin assessment.

## 2021-06-20 NOTE — PROGRESS NOTES
Internal Medicine PGY- 3 Resident Progress Note        PCP: INES Medina    Date of Admission: 6/19/2021    Chief Complaint: Dizziness    Subjective: Patient was seen and evaluated at bedside. Said the lower extremity edema is slightly better this morning. Dizziness has improved significantly. Denies any chest pain, shortness of breath, palpitations this morning. Normal sinus and sinus hodan on tele. Blood pressure on the lower side. Good urine output. Medications:  Reviewed    Infusion Medications    sodium chloride       Scheduled Medications    meropenem  1,000 mg Intravenous Q12H    sodium chloride flush  5-40 mL Intravenous 2 times per day    heparin (porcine)  5,000 Units Subcutaneous 3 times per day    aspirin  81 mg Oral Daily    pantoprazole  40 mg Oral QAM AC    tamsulosin  0.8 mg Oral Daily    vitamin B-12  1,000 mcg Oral Daily    gabapentin  400 mg Oral BID    atorvastatin  80 mg Oral Nightly     PRN Meds: zolpidem, sodium chloride flush, sodium chloride, ondansetron **OR** ondansetron, polyethylene glycol, acetaminophen **OR** acetaminophen, melatonin      Intake/Output Summary (Last 24 hours) at 6/20/2021 1022  Last data filed at 6/20/2021 0954  Gross per 24 hour   Intake --   Output 4075 ml   Net -4075 ml       Physical Exam Performed:    BP (!) 99/59   Pulse 54   Temp 97.7 °F (36.5 °C) (Oral)   Resp 18   Ht 5' 11\" (1.803 m)   Wt 206 lb 9.1 oz (93.7 kg)   SpO2 96%   BMI 28.81 kg/m²     Physical Exam  Constitutional:       General: He is not in acute distress. HENT:      Mouth/Throat:      Mouth: Mucous membranes are moist.   Eyes:      General: No scleral icterus. Extraocular Movements: Extraocular movements intact. Cardiovascular:      Rate and Rhythm: Normal rate and regular rhythm. Heart sounds: No murmur heard. Pulmonary:      Effort: Pulmonary effort is normal.      Breath sounds: No wheezing or rales. Abdominal:      Palpations: Abdomen is soft. Tenderness: There is no abdominal tenderness. Comments: Hepatomegaly   Musculoskeletal:      Cervical back: Normal range of motion. Right lower leg: Edema present. Left lower leg: Edema present. Comments: 3+ pitting edema up to knees   Neurological:      General: No focal deficit present. Mental Status: He is alert and oriented to person, place, and time. Sensory: No sensory deficit. Motor: No weakness. Psychiatric:         Mood and Affect: Mood normal.          Labs:   Recent Labs     06/19/21  1808 06/20/21  0216   WBC 6.3 5.5   HGB 11.2* 10.4*   HCT 34.5* 31.9*    136     Recent Labs     06/19/21  1808 06/20/21  0216    142   K 3.8 3.7    103   CO2 29 28   BUN 44* 38*   CREATININE 1.8* 1.6*   CALCIUM 9.0 8.5     Recent Labs     06/19/21  1808 06/20/21 0216   AST 15 13*   ALT 10 9*   BILIDIR  --  <0.2   BILITOT 0.5 0.4   ALKPHOS 75 65     No results for input(s): INR in the last 72 hours. Recent Labs     06/19/21  1808 06/19/21  2148 06/20/21  0216 06/20/21  0640   CKTOTAL 93  --   --   --    TROPONINI 0.06* 0.05* 0.05* 0.05*       Urinalysis:      Lab Results   Component Value Date    NITRU POSITIVE 06/19/2021    WBCUA None seen 06/19/2021    BACTERIA 4+ 06/05/2021    RBCUA None seen 06/19/2021    BLOODU TRACE-INTACT 06/19/2021    SPECGRAV 1.010 06/19/2021    GLUCOSEU Negative 06/19/2021       Radiology:  XR CHEST PORTABLE   Final Result   1. Left lower lobe atelectasis, airspace disease and effusion redemonstrated. 2. No evidence of congestive failure      CT Head WO Contrast   Final Result   1. Brain atrophy, Otherwise normal      US GALLBLADDER RUQ    (Results Pending)           Assessment/Plan:    Acute Cystitis without hematuria with chronic indwelling catheter   - He had a analysis drawn by PCP on 6/5.   Noted to have 159 WBCs, urine culture positive for MDRO Proteus Pennis   - Is at risk for colonization, no leukocytosis, afebrile   - Started on meropenem. Sent for repeat urine culture today. - Iglesias catheter exchanged   -      ALIS  - Patient has history of CKD stage III. His baseline is around 1.2 last around 6/5/2021.    - Improved with Lasix   - 3+ pitting edema on exam.  We will refrain from giving IV fluids at this point pending urine studies. -We will obtain urine studies including urine sodium, creatinine and urea as he is on Lasix.   -He has decreased albumin to globulin ratio the setting of a normal albumin level. In the setting of recurrent ALIS. We will send SPE,P UPEP, kappa/ lambda. -IV Lasix 40 mg once.      LE Edema  -He is wheelchair-bound. He does not have any shortness of breath or oxygen requirement low suspicion for heart failure at this time. His last echo from 1/2020 was normal EF with normal diastolic function. Suspect dependent edema at this time.   -We will use compression bandages.  -Hold home amlodipine in setting of low blood pressure      Troponinemia  -No chest pain or shortness of breath. EKG unchanged from baseline. He has a first-degree heart block which is chronic. Troponin significantly lower than his previous admissions.   - [facility] 8/19/20 Patient with 5 vessel bypass in 4 of the grafts are in really great shape     Dizziness  -Unclear etiology. Self-limited episode.   -Does have a significant cardiac history. Will monitor on telemetry. - Could be due to bradycardic episode vs acute infection      CAD status post CABG  -Continue home aspirin statin.     Chronic indwelling Iglesias catheter. -  Encourage outpatient follow-up with urology for further management including possible suprapubic catheter. DVT Prophylaxis: Heparin  Diet: ADULT DIET;  Regular; Low Potassium (Less than 3000 mg/day)  Code Status: Full Code    PT/OT Eval Status: Pending     Dispo - [facility]     I will discuss the patient with MD Machelle Maurice MD, MD  Internal Medicine Resident PGY- 3

## 2021-06-21 ENCOUNTER — APPOINTMENT (OUTPATIENT)
Dept: ULTRASOUND IMAGING | Age: 81
DRG: 683 | End: 2021-06-21
Payer: MEDICARE

## 2021-06-21 LAB
ALBUMIN SERPL-MCNC: 3 G/DL (ref 3.1–4.9)
ALPHA-1-GLOBULIN: 0.3 G/DL (ref 0.2–0.4)
ALPHA-2-GLOBULIN: 0.8 G/DL (ref 0.4–1.1)
ANION GAP SERPL CALCULATED.3IONS-SCNC: 11 MMOL/L (ref 3–16)
BASOPHILS ABSOLUTE: 0 K/UL (ref 0–0.2)
BASOPHILS RELATIVE PERCENT: 0.7 %
BETA GLOBULIN: 1.4 G/DL (ref 0.9–1.6)
BUN BLDV-MCNC: 35 MG/DL (ref 7–20)
CALCIUM SERPL-MCNC: 8 MG/DL (ref 8.3–10.6)
CHLORIDE BLD-SCNC: 101 MMOL/L (ref 99–110)
CO2: 26 MMOL/L (ref 21–32)
CREAT SERPL-MCNC: 1.5 MG/DL (ref 0.8–1.3)
EOSINOPHILS ABSOLUTE: 0.6 K/UL (ref 0–0.6)
EOSINOPHILS RELATIVE PERCENT: 8.6 %
ESTIMATED AVERAGE GLUCOSE: 119.8 MG/DL
GAMMA GLOBULIN: 2 G/DL (ref 0.6–1.8)
GFR AFRICAN AMERICAN: 55
GFR NON-AFRICAN AMERICAN: 45
GLUCOSE BLD-MCNC: 104 MG/DL (ref 70–99)
GLUCOSE BLD-MCNC: 105 MG/DL (ref 70–99)
GLUCOSE BLD-MCNC: 115 MG/DL (ref 70–99)
GLUCOSE BLD-MCNC: 117 MG/DL (ref 70–99)
GLUCOSE BLD-MCNC: 122 MG/DL (ref 70–99)
GLUCOSE BLD-MCNC: 157 MG/DL (ref 70–99)
HBA1C MFR BLD: 5.8 %
HCT VFR BLD CALC: 29.3 % (ref 40.5–52.5)
HEMOGLOBIN: 9.6 G/DL (ref 13.5–17.5)
LYMPHOCYTES ABSOLUTE: 2 K/UL (ref 1–5.1)
LYMPHOCYTES RELATIVE PERCENT: 30.5 %
MCH RBC QN AUTO: 30.1 PG (ref 26–34)
MCHC RBC AUTO-ENTMCNC: 32.9 G/DL (ref 31–36)
MCV RBC AUTO: 91.8 FL (ref 80–100)
MONOCYTES ABSOLUTE: 0.5 K/UL (ref 0–1.3)
MONOCYTES RELATIVE PERCENT: 8.5 %
NEUTROPHILS ABSOLUTE: 3.3 K/UL (ref 1.7–7.7)
NEUTROPHILS RELATIVE PERCENT: 51.7 %
PDW BLD-RTO: 17.9 % (ref 12.4–15.4)
PERFORMED ON: ABNORMAL
PLATELET # BLD: 126 K/UL (ref 135–450)
PMV BLD AUTO: 10.2 FL (ref 5–10.5)
POTASSIUM REFLEX MAGNESIUM: 4 MMOL/L (ref 3.5–5.1)
RBC # BLD: 3.2 M/UL (ref 4.2–5.9)
SODIUM BLD-SCNC: 138 MMOL/L (ref 136–145)
SPE/IFE INTERPRETATION: NORMAL
TOTAL PROTEIN: 7.5 G/DL (ref 6.4–8.2)
URINE CULTURE, ROUTINE: NORMAL
URINE ELECTROPHORESIS INTERP: NORMAL
WBC # BLD: 6.4 K/UL (ref 4–11)

## 2021-06-21 PROCEDURE — 93005 ELECTROCARDIOGRAM TRACING: CPT | Performed by: INTERNAL MEDICINE

## 2021-06-21 PROCEDURE — 85025 COMPLETE CBC W/AUTO DIFF WBC: CPT

## 2021-06-21 PROCEDURE — 6370000000 HC RX 637 (ALT 250 FOR IP): Performed by: STUDENT IN AN ORGANIZED HEALTH CARE EDUCATION/TRAINING PROGRAM

## 2021-06-21 PROCEDURE — 80048 BASIC METABOLIC PNL TOTAL CA: CPT

## 2021-06-21 PROCEDURE — 83036 HEMOGLOBIN GLYCOSYLATED A1C: CPT

## 2021-06-21 PROCEDURE — 1200000000 HC SEMI PRIVATE

## 2021-06-21 PROCEDURE — 2580000003 HC RX 258: Performed by: STUDENT IN AN ORGANIZED HEALTH CARE EDUCATION/TRAINING PROGRAM

## 2021-06-21 PROCEDURE — 97530 THERAPEUTIC ACTIVITIES: CPT

## 2021-06-21 PROCEDURE — 6360000002 HC RX W HCPCS: Performed by: STUDENT IN AN ORGANIZED HEALTH CARE EDUCATION/TRAINING PROGRAM

## 2021-06-21 PROCEDURE — 97166 OT EVAL MOD COMPLEX 45 MIN: CPT

## 2021-06-21 PROCEDURE — 6370000000 HC RX 637 (ALT 250 FOR IP): Performed by: INTERNAL MEDICINE

## 2021-06-21 PROCEDURE — 76705 ECHO EXAM OF ABDOMEN: CPT

## 2021-06-21 PROCEDURE — 99222 1ST HOSP IP/OBS MODERATE 55: CPT | Performed by: INTERNAL MEDICINE

## 2021-06-21 PROCEDURE — 51702 INSERT TEMP BLADDER CATH: CPT

## 2021-06-21 PROCEDURE — 99223 1ST HOSP IP/OBS HIGH 75: CPT | Performed by: INTERNAL MEDICINE

## 2021-06-21 PROCEDURE — 36415 COLL VENOUS BLD VENIPUNCTURE: CPT

## 2021-06-21 RX ORDER — 0.9 % SODIUM CHLORIDE 0.9 %
1000 INTRAVENOUS SOLUTION INTRAVENOUS ONCE
Status: COMPLETED | OUTPATIENT
Start: 2021-06-21 | End: 2021-06-21

## 2021-06-21 RX ORDER — INSULIN LISPRO 100 [IU]/ML
0-6 INJECTION, SOLUTION INTRAVENOUS; SUBCUTANEOUS
Status: DISCONTINUED | OUTPATIENT
Start: 2021-06-21 | End: 2021-06-24 | Stop reason: HOSPADM

## 2021-06-21 RX ORDER — DEXTROSE MONOHYDRATE 25 G/50ML
12.5 INJECTION, SOLUTION INTRAVENOUS PRN
Status: DISCONTINUED | OUTPATIENT
Start: 2021-06-21 | End: 2021-06-24 | Stop reason: HOSPADM

## 2021-06-21 RX ORDER — 0.9 % SODIUM CHLORIDE 0.9 %
1000 INTRAVENOUS SOLUTION INTRAVENOUS ONCE
Status: DISCONTINUED | OUTPATIENT
Start: 2021-06-21 | End: 2021-06-21

## 2021-06-21 RX ORDER — NICOTINE POLACRILEX 4 MG
15 LOZENGE BUCCAL PRN
Status: DISCONTINUED | OUTPATIENT
Start: 2021-06-21 | End: 2021-06-24 | Stop reason: HOSPADM

## 2021-06-21 RX ORDER — DEXTROSE MONOHYDRATE 50 MG/ML
100 INJECTION, SOLUTION INTRAVENOUS PRN
Status: DISCONTINUED | OUTPATIENT
Start: 2021-06-21 | End: 2021-06-24 | Stop reason: HOSPADM

## 2021-06-21 RX ORDER — INSULIN LISPRO 100 [IU]/ML
0-3 INJECTION, SOLUTION INTRAVENOUS; SUBCUTANEOUS NIGHTLY
Status: DISCONTINUED | OUTPATIENT
Start: 2021-06-21 | End: 2021-06-21

## 2021-06-21 RX ADMIN — SODIUM CHLORIDE 1000 ML: 9 INJECTION, SOLUTION INTRAVENOUS at 00:42

## 2021-06-21 RX ADMIN — MEROPENEM 1000 MG: 1 INJECTION, POWDER, FOR SOLUTION INTRAVENOUS at 21:27

## 2021-06-21 RX ADMIN — ZOLPIDEM TARTRATE 10 MG: 5 TABLET ORAL at 23:05

## 2021-06-21 RX ADMIN — MEROPENEM 1000 MG: 1 INJECTION, POWDER, FOR SOLUTION INTRAVENOUS at 07:46

## 2021-06-21 RX ADMIN — HEPARIN SODIUM 5000 UNITS: 5000 INJECTION INTRAVENOUS; SUBCUTANEOUS at 21:16

## 2021-06-21 RX ADMIN — GABAPENTIN 400 MG: 400 CAPSULE ORAL at 21:16

## 2021-06-21 RX ADMIN — ATORVASTATIN CALCIUM 80 MG: 80 TABLET, FILM COATED ORAL at 21:16

## 2021-06-21 RX ADMIN — GABAPENTIN 400 MG: 400 CAPSULE ORAL at 09:20

## 2021-06-21 RX ADMIN — Medication 5 ML: at 23:21

## 2021-06-21 RX ADMIN — HEPARIN SODIUM 5000 UNITS: 5000 INJECTION INTRAVENOUS; SUBCUTANEOUS at 05:20

## 2021-06-21 RX ADMIN — HEPARIN SODIUM 5000 UNITS: 5000 INJECTION INTRAVENOUS; SUBCUTANEOUS at 14:56

## 2021-06-21 RX ADMIN — Medication 10 ML: at 09:20

## 2021-06-21 RX ADMIN — TAMSULOSIN HYDROCHLORIDE 0.8 MG: 0.4 CAPSULE ORAL at 09:20

## 2021-06-21 RX ADMIN — ACETAMINOPHEN 650 MG: 325 TABLET ORAL at 03:50

## 2021-06-21 RX ADMIN — Medication 10 MG: at 23:05

## 2021-06-21 RX ADMIN — CYANOCOBALAMIN TAB 1000 MCG 1000 MCG: 1000 TAB at 09:20

## 2021-06-21 RX ADMIN — INSULIN LISPRO 1 UNITS: 100 INJECTION, SOLUTION INTRAVENOUS; SUBCUTANEOUS at 13:00

## 2021-06-21 RX ADMIN — PANTOPRAZOLE SODIUM 40 MG: 40 TABLET, DELAYED RELEASE ORAL at 05:19

## 2021-06-21 RX ADMIN — ASPIRIN 81 MG: 81 TABLET, CHEWABLE ORAL at 09:20

## 2021-06-21 ASSESSMENT — PAIN SCALES - GENERAL
PAINLEVEL_OUTOF10: 0
PAINLEVEL_OUTOF10: 0
PAINLEVEL_OUTOF10: 4
PAINLEVEL_OUTOF10: 0

## 2021-06-21 ASSESSMENT — PAIN DESCRIPTION - PROGRESSION
CLINICAL_PROGRESSION: NOT CHANGED

## 2021-06-21 ASSESSMENT — PAIN DESCRIPTION - LOCATION: LOCATION: SHOULDER

## 2021-06-21 ASSESSMENT — PAIN - FUNCTIONAL ASSESSMENT: PAIN_FUNCTIONAL_ASSESSMENT: ACTIVITIES ARE NOT PREVENTED

## 2021-06-21 ASSESSMENT — PAIN DESCRIPTION - DESCRIPTORS: DESCRIPTORS: ACHING;NAGGING;OTHER (COMMENT)

## 2021-06-21 ASSESSMENT — PAIN DESCRIPTION - FREQUENCY: FREQUENCY: CONTINUOUS

## 2021-06-21 ASSESSMENT — PAIN DESCRIPTION - ONSET: ONSET: GRADUAL

## 2021-06-21 ASSESSMENT — PAIN DESCRIPTION - ORIENTATION: ORIENTATION: LEFT

## 2021-06-21 ASSESSMENT — PAIN DESCRIPTION - PAIN TYPE: TYPE: ACUTE PAIN

## 2021-06-21 NOTE — PROGRESS NOTES
DAYSI/OX4. VSS. NSR-ALFONSO on tele. Worked with PT/OT today, Stand pivot w/gait belt x1-2. Cardiology and ID consulted today. Previous existing skin issues noted. BLE ace wrapped due to swelling. Patient on specialty mattress, redness/swelling/excoriation noted to rich rectal area/scrotum/ groin. Patient also has scattered cuts/abrasions. Denies pain. Schneider care provided today, schneider draining yellow urine.

## 2021-06-21 NOTE — PROGRESS NOTES
Assessment Denuded;Pink/red 06/21/21 1255   Drainage Amount None 06/21/21 1255   Margins Defined edges; Unattached edges 06/21/21 1255   Wound Thickness Description not for Pressure Injury Partial thickness 06/21/21 1255   Number of days: 1       Wound 06/19/21 Finger (Comment which one) Anterior;Right (Active)   Dressing/Treatment Open to air 06/21/21 1255   Number of days: 1       Wound 06/19/21 Knee Right;Left bilateral knee abrasions with dry intact scabns scattered over bilaterally (Active)   Dressing/Treatment Open to air 06/21/21 1255   Wound Assessment Eschar dry 06/21/21 1255   Drainage Amount None 06/21/21 1255   Margins Attached edges; Defined edges 06/21/21 1255   Number of days: 1       Wound 06/19/21 Heel Left epidermis peeling off, dry redness underneath (Active)   Dressing/Treatment Ace wrap 06/21/21 1255   Number of days: 1       Response to treatment: pleasant, no c/o's  Plan:   Plan of Care: [REMOVED] Wound 06/19/21 Thigh Anterior;Right raised redenned area-Dressing/Treatment: Open to air, Zinc paste  Wound 06/19/21 Scrotum Posterior-Dressing/Treatment: Zinc paste, Open to air  [REMOVED] Wound 04/22/21 Coccyx Sacral-coccygeal IAD/MASD - excoriated, denuding skin  - POA-Dressing/Treatment: Open to air, Zinc paste  Wound 06/19/21 Finger (Comment which one) Anterior;Right-Dressing/Treatment: Open to air  Wound 06/19/21 Knee Right;Left bilateral knee abrasions with dry intact scabns scattered over bilaterally-Dressing/Treatment: Open to air  Wound 06/19/21 Heel Left epidermis peeling off, dry redness underneath-Dressing/Treatment: Ace wrap    Keep as clean and dry as possible and offloading  By repositioning q 2 hours. Zinc to treat scrotum and back side for protection. Keep scabs intact as natural bandages to knees.     Specialty Bed Required : in place  [x] Low Air Loss   [x] Pressure Redistribution  [] Fluid Immersion  [] Bariatric  [] Total Pressure Relief  [] Other:     Current Diet: ADULT DIET; Regular; Low Potassium (Less than 3000 mg/day)  Adult Oral Nutrition Supplement; Diabetic Oral Supplement    Patient/Caregiver Teaching: wounds, skin scarring, treatment as above  Level of patient/caregiver understanding able to:   [x] Indicates understanding       [] Needs reinforcement  [] Unsuccessful      [x] Verbal Understanding  [] Demonstrated understanding       [] No evidence of learning  [] Refused teaching         [] N/A       Electronically signed by Noelle Nguyen RN, Rose Tillman on 6/21/2021 at 1:45 PM

## 2021-06-21 NOTE — PROGRESS NOTES
Occupational Therapy   Occupational Therapy Initial Assessment/Tx Note  Date: 2021   Patient Name: Sheldon Gore  MRN: [id number]     : 1940    Date of Service: 2021     Assessment: Pt reports being home from SNF for a week or two and back to being independent with transfers and at baseline for ADLs (HHA for bathing, though home health had not started since SNF d/c). Though limited a bit by hospital environment, pt demonstrated ability to transfer near his usual level. Pending progress and length of stay, anticipate pt will be able to d/c home safely as long as home health (HHA and PT/OT) start up in a timely manner. Discharge Recommendations: Sheldon Gore scored a 19/24 on the AM-PAC ADL Inpatient form. Current research shows that an AM-PAC score of 18 or greater is typically associated with a discharge to the patient's home setting. Based on the patient's AM-PAC score, and their current ADL deficits, it is recommended that the patient have 2-3 sessions per week of Occupational Therapy at d/c to increase the patient's independence. At this time, this patient demonstrates the endurance and safety to discharge home with prn assist, home OT and a follow up treatment frequency of 2-3x/wk. Please see assessment section for further patient specific details. OT Equipment Recommendations  Equipment Needed: No    HOME HEALTH CARE: LEVEL 1 STANDARD    - Initial home health evaluation to occur within 24-48 hours, in patient home   - Therapy to evaluate with goal of regaining prior level of functioning   - Therapy to evaluate if patient has [address] needs for personal care    Assessment   Performance deficits / Impairments: Decreased functional mobility ; Decreased ADL status; Decreased endurance  Treatment Diagnosis: Decreased activity tolerance, impaired ADLs and mobility  Decision Making: Medium Complexity  REQUIRES OT FOLLOW UP: Yes  Activity Tolerance  Activity Tolerance: Patient back up. keeps cell phone close most of the time. ... Objective   Vision: Within Functional Limits  Hearing: Within functional limits    Orientation  Overall Orientation Status: Within Functional Limits     Balance  Sitting Balance: Independent  Standing Balance: Contact guard assistance (nearly full stand during transfer)  Toilet Transfers  Toilet - Technique: Stand pivot  Equipment Used: Standard bedside commode (simulated)  Toilet Transfer: Minimal assistance  ADL  Feeding: Independent  Grooming: Independent (offered, declined)  LE Dressing: Moderate assistance (socks donned by OT; pt reports he is able, but it takes him about 30 minutes to get dressed in the morning, he has a reacher and sock aide but does not usually use them)  Toileting: Unable to assess(comment) (indwelling catheter, anticipate pt could use BSC with min assist)        Bed mobility  Supine to Sit: Modified independent (HOB raised)  Transfers  Stand Pivot Transfers: Minimal assistance  Transfer Comments: typically stands from lift chair (raised slightly) or w/c at home, pt feels home set up is more conducive to his transfer     Cognition  Overall Cognitive Status: [facility]              Plan  If pt discharges prior to next tx, this note will serve as d/c summary.  Continue per POC if pt does not d/c.     Plan  Times per week: 2-5x  Times per day: Daily  Current Treatment Recommendations: Functional Mobility Training, Endurance Training, Self-Care / ADL, Safety Education & Training, Equipment Evaluation, Education, & procurement, Patient/Caregiver Education & Training, Strengthening      AM-PAC Score  AM-PAC Inpatient Daily Activity Raw Score: 19 (06/21/21 1607)  AM-PAC Inpatient ADL T-Scale Score : 40.22 (06/21/21 1607)  ADL Inpatient CMS 0-100% Score: 42.8 (06/21/21 1607)  ADL Inpatient CMS G-Code Modifier : CK (06/21/21 1607)    Goals  Short term goals  Time Frame for Short term goals: by D/C  Short term goal 1: Transfer to/from chair/BSC with SBA - Not met  Short term goal 2: Complete toileting via BSC with SBA - Not met  Short term goal 3: Mariana Jody pants/brief with SBA, AE as needed - Not met       Therapy Time   Individual Concurrent Group Co-treatment   Time In 1520         Time Out 1600         Minutes 40          Timed Code Tx Min: 25  Total Tx Time: 40       Makayla Higgins, OT

## 2021-06-21 NOTE — CARE COORDINATION
Case Management Assessment           Initial Evaluation                Date / Time of Evaluation: 6/21/2021 4:58 PM                 Assessment Completed by: Stacey [place] RN    Patient Name: Sujatha Miguel     YOB: 1940  Diagnosis: ALIS (acute kidney injury) [facility]) [N17.9]     Date / Time: 6/19/2021  5:19 PM    Patient Admission Status: Inpatient    If patient is discharged prior to next notation, then this note serves as note for discharge by case management. [facility] Patient: No    Chart Reviewed: Yes  Patient/ Family Interviewed: Yes    Initial assessment completed at bedside with: pt    Hospitalization in the last 30 days: No    Emergency Contacts:  Extended Emergency Contact Information  Primary Emergency Contact: [place] [address] Phone: [phone]  Work Phone: [phone]  Mobile Phone: [phone]  Relation: Child  Secondary Emergency Contact: Sita Perez  Address: [address]  Home Phone: [phone]  Relation: Other    Advance Directives:   Code Status: Full [address]: Yes    Financial  Payor: Farhad Dubois / Plan: Cynthia Rivera PLUS HMO / Product Type: *No Product type* /     Pre-cert required for SNF: Yes    [address] [facility] [phone] - F [phone]  [address] [place] [id number]  Phone: [phone] Fax: [phone]    [facility] #[id number] [facility] [address]  Phone: [phone] Fax: [phone]      Potential assistance Purchasing Medications: Potential Assistance Purchasing Medications: No  Does Patient want to participate in local refill/ meds to beds program?: No    Meds To Beds General Rules:  1. Can ONLY be done Monday- Friday between 8:30am-5pm  2. Prescription(s) must be in pharmacy by 3pm to be filled same day  3. Copy

## 2021-06-21 NOTE — CONSULTS
Infectious Diseases Inpatient Consult Note    Reason for Consult:   UTI vs bacteriuria  Requesting Physician:   Dr Diann Duran  Primary Care Physician:  Travis Mandel  History Obtained From:   Pt, EPIC    Admit Date: 6/19/2021  Hospital Day: 3    CHIEF COMPLAINT:       Chief Complaint   Patient presents with    Dizziness     HAD EPISODE OF DIZZINESS EALIER TODAY, NOT SURE HOW LONG IT LASTED       HISTORY OF PRESENT ILLNESS:      80 yo man with hx CAD, HTN, BPH, HL, nephrolithiasis  Urine cult 6/5 - Proteus penneri     Presents with dizziness, assoc lightheadedness  Sx abrupt onset. No assoc  sx - denies dysuria, polyuria, suprapubic pain, CVAT    In ED 6/19, afeb, WBC 6.3, UA mod LE, no WBC, CXR LLL atelectasis  Admit. Started on meropenem  Seen by EPS - rec event monitor    Today 6/21 - afeb, WBC 6.4. No dizziness / lightheadedness    Past Medical History:    Past Medical History:   Diagnosis Date    BPH (benign prostatic hyperplasia)     Carotid stenosis 12/2013    JESU 76-92% stenosis; LICA 93-24% stenosis    Cellulitis 12/2013    LLE    Chronic back pain     Diverticular disease     Erectile dysfunction     GERD (gastroesophageal reflux disease)     History of atrial fibrillation     Hypercholesteremia     Hypertension     Lower GI bleed     MDRO (multiple drug resistant organisms) resistance 10/26/2019    urine    Neuromuscular disorder (HCC)     spasticity    Renal insufficiency     Risk for falls     uses walker    Tinea corporis 12/2013    LLE    Vitamin B12 deficiency        Past Surgical History:    Past Surgical History:   Procedure Laterality Date    BACK SURGERY  2006    lower lumbar    CORONARY ARTERY BYPASS GRAFT  12/13/2013    CABG x 5 (Dr Derek Teresa), svg to diag, om1 and 3, distal rca, kelly to lad.      CYSTOSCOPY N/A 1/10/2019    CYSTOSCOPY performed by Antonio Robins MD at [facility] [address] Right 5/1/2015    [facility]    SIGMOIDOSCOPY N/A 6/11/2020 SIGMOIDOSCOPY DIAGNOSTIC FLEXIBLE performed by Pradeep Cormier MD at [address] [facility] N/A 5/4/2020    EGD BIOPSY performed by Pradeep Cormier MD at [facility] ENDOSCOPY       Current Medications:     insulin lispro  0-6 Units Subcutaneous TID WC    insulin lispro  0-3 Units Subcutaneous Nightly    meropenem  1,000 mg Intravenous Q12H    sodium chloride flush  5-40 mL Intravenous 2 times per day    heparin (porcine)  5,000 Units Subcutaneous 3 times per day    aspirin  81 mg Oral Daily    pantoprazole  40 mg Oral QAM AC    tamsulosin  0.8 mg Oral Daily    vitamin B-12  1,000 mcg Oral Daily    gabapentin  400 mg Oral BID    atorvastatin  80 mg Oral Nightly       Allergies:  Bactrim [sulfamethoxazole-trimethoprim]    Social History:    TOBACCO:    None   ETOH:    None   DRUGS:   None   MARITAL STATUS:      OCCUPATION:   None    Family History:   No immunodeficiency    REVIEW OF SYSTEMS:    No fever / chills / sweats. No weight loss. No visual change, eye pain, eye discharge. No oral lesion, sore throat, dysphagia. Denies cough / sputum. Denies chest pain, palpitations. Denies n / v / abd pain. No diarrhea. Denies dysuria or change in urinary function. Denies joint swelling or pain. No myalgia, arthralgia. Denies skin changes, itching  Denies focal weakness, sensory change or other neurologic symptom    Denies new / worse depression, psychiatric symptoms    PHYSICAL EXAM:      Vitals:    /64   Pulse (!) 47   Temp 96.7 °F (35.9 °C) (Oral)   Resp 16   Ht 5' 11\" (1.803 m)   Wt 207 lb 0.2 oz (93.9 kg)   SpO2 99%   BMI 28.87 kg/m²     GENERAL: No apparent distress.   RA  HEENT: Membranes moist, no oral lesion, PERRL  NECK:  Supple, no lymphadenopathy  LUNGS: Clear b/l, no rales, no dullness  CARDIAC: RRR, no murmur appreciated  ABD:  + BS, soft / NT -  No suprapubic tenderness, no CVAT  EXT:  No rash, no edema, no lesions  NEURO: No focal neurologic findings  PSYCH: Orientation, sensorium, mood normal  LINES:  Peripheral iv    DATA:    Lab Results   Component Value Date    WBC 6.4 2021    HGB 9.6 (L) 2021    HCT 29.3 (L) 2021    MCV 91.8 2021     (L) 2021     Lab Results   Component Value Date    CREATININE 1.5 (H) 2021    BUN 35 (H) 2021     2021    K 4.0 2021     2021    CO2 26 2021       Hepatic Function Panel:   Lab Results   Component Value Date    ALKPHOS 65 2021    ALT 9 2021    AST 13 2021    PROT 7.3 2021    BILITOT 0.4 2021    BILIDIR <0.2 2021    IBILI see below 2021    LABALBU 3.2 2021       Micro:   Urine cult - no growth     Urine cult >100k P penneri  Proteus penneri   Antibiotic Interpretation YANCY   amoxicillin-clavulanate Sensitive <=8/4 mcg/mL   ampicillin Resistant >16 mcg/mL   aztreonam Resistant >16 mcg/mL   ceFAZolin Resistant >16 mcg/mL   cefepime Sensitive <=2 mcg/mL   cefTRIAXone Resistant >32 mcg/mL   cefuroxime Resistant >16 mcg/mL   ciprofloxacin Resistant >2 mcg/mL   ertapenem Sensitive <=0.5 mcg/mL   gentamicin Resistant >8 mcg/mL   meropenem Sensitive <=1 mcg/mL   nitrofurantoin Resistant 64 mcg/mL   piperacillin-tazobactam Sensitive <=16 mcg/mL   tobramycin Resistant >8 mcg/mL   trimethoprim-sulfamethoxazole Resistant >2/38 mcg/mL     Imagin/21 Abd u/s  Study limitations, as above. Mild hepatomegaly with heterogeneous echogenicity, nonspecific.  Abd / Pelvic CT:  1. Unchanged calculi left kidney. No evidence of renal obstruction or staghorn calculi. 2. Unchanged small left exudative pleural effusion. 3. Hiatal hernia. 4. Cholelithiasis. 5. Fecal impaction rectosigmoid colon.          IMPRESSION:      Patient Active Problem List   Diagnosis    Hypotension    Light headed    Cellulitis    Essential hypertension    GERD (gastroesophageal reflux disease)    Acute blood loss anemia    Tinea corporis    Carotid stenosis    CAD (coronary artery disease)    S/P CABG x 5    Lower GI bleeding    Hip fracture, right (HCC)    Acute right hip pain    Acute low back pain without sciatica    Hypothermia    Complicated UTI (urinary tract infection)    Edema    Problem with urinary catheter (HCC)    Acute encephalopathy    Chronic indwelling Iglesias catheter    Hyperlipidemia    Bilateral lower leg cellulitis    Neurogenic bladder    Bacteriuria    Abnormality of urethral meatus    Gross hematuria    CHF with unknown LVEF (HCC)    Dyspnea    Bradycardia    Pseudomonas infection    Acute renal injury ([address]    Cellulitis of scrotum    Constipation    Encopresis with constipation and overflow incontinence    Abnormal stress test    H/O angiography    Abnormal angiogram    Acute cystitis with hematuria    Acute renal failure superimposed on stage 3 chronic kidney disease (HCC)    Urinary tract infection associated with indwelling urethral catheter (HCC)    Encephalopathy acute    ALIS (acute kidney injury) (HCC)    Altered mental status    Hyperkalemia       Hx CAD, HTN, BPH, HL, nephrolithiasis    Presents with dizziness, assoc lightheadedness  Bacteriuria, no UTI (no pyuria, no sx)    RECOMMENDATIONS:    D/c meropenem on 6/22 (will have completed 3 days)  No further ID w/u, treatment    Medical Decision Making:   The following items were considered in medical decision making:  Discussion of patient care with other providers  Reviewed clinical lab tests  Reviewed radiology tests  Reviewed other diagnostic tests/interventions  Independent review of radiologic images  Microbiology cultures and other micro tests reviewed      Discussed with pt  Leticia Gallegos MD

## 2021-06-21 NOTE — PLAN OF CARE
Problem: Falls - Risk of:  Goal: Will remain free from falls  Description: Will remain free from falls  Outcome: Ongoing  Note: Pt calls appropriately for assistance and does not attempt to get out of bed. Safety precautions in place; no falls or injuries to date this hospitalization. Problem: Skin Integrity:  Goal: Will show no infection signs and symptoms  Description: Will show no infection signs and symptoms  Outcome: Ongoing  Note: Skin integrity severely impaired, as documented on admission. Skin cleansed and zinc paste applied to bottom, sacrum, and scrotum. Pt now on specialty mattress as ordered. [id number]  nurse to see.

## 2021-06-21 NOTE — PROGRESS NOTES
Internal Medicine  PGY 1  Progress Note    Admit Date: 6/19/2021  Diet: ADULT DIET; Regular; Low Potassium (Less than 3000 mg/day)  Adult Oral Nutrition Supplement; Diabetic Oral Supplement    CC: Dizziness    Interval history:   Overnight, there were no acute events. Patient was seen this morning in bed. She he endorsed the episode of dizziness he had has completely resolved and is not experiencing any at this moment. Patient endorsed the fact that he has a chronic indwelling Iglesias catheter because of a weak bladder. According to patient, he is due to get a suprapubic catheter with urology soon. Patient denies fevers, chills, nausea, vomiting, chest pain, shortness of breath, diarrhea, constipation, dysuria, urinary frequency or urgency.      Medications:     Scheduled Meds:   insulin lispro  0-6 Units Subcutaneous TID WC    insulin lispro  0-3 Units Subcutaneous Nightly    meropenem  1,000 mg Intravenous Q12H    sodium chloride flush  5-40 mL Intravenous 2 times per day    heparin (porcine)  5,000 Units Subcutaneous 3 times per day    aspirin  81 mg Oral Daily    pantoprazole  40 mg Oral QAM AC    tamsulosin  0.8 mg Oral Daily    vitamin B-12  1,000 mcg Oral Daily    gabapentin  400 mg Oral BID    atorvastatin  80 mg Oral Nightly     Continuous Infusions:   dextrose      dextrose      sodium chloride       PRN Meds:glucose, dextrose, glucagon (rDNA), dextrose, zolpidem, glucose, dextrose, glucagon (rDNA), dextrose, sodium chloride flush, sodium chloride, ondansetron **OR** ondansetron, polyethylene glycol, acetaminophen **OR** acetaminophen, melatonin    Objective:   Vitals:   T-max:  Patient Vitals for the past 8 hrs:   BP Temp Temp src Pulse Resp SpO2   06/21/21 1254 115/64 96.7 °F (35.9 °C) Oral (!) 47 16 99 %   06/21/21 0749 110/70 97.5 °F (36.4 °C) Oral (!) 44 16 95 %       Intake/Output Summary (Last 24 hours) at 6/21/2021 1420  Last data filed at 6/21/2021 1208  Gross per 24 hour   Intake 2707 ml   Output 1550 ml   Net 1157 ml       Physical Exam  HENT:      Head: Normocephalic and atraumatic. Mouth/Throat:      Mouth: Mucous membranes are moist.   Eyes:      General: No scleral icterus. Extraocular Movements: Extraocular movements intact. Pupils: Pupils are equal, round, and reactive to light. Cardiovascular:      Rate and Rhythm: Normal rate and regular rhythm. Pulses: Normal pulses. Heart sounds: Murmur heard. No friction rub. No gallop. Comments: Systolic murmur with mild radiation to the carotids appreciated. Pulmonary:      Effort: Pulmonary effort is normal. No respiratory distress. Breath sounds: Normal breath sounds. No wheezing or rales. Abdominal:      General: Bowel sounds are normal. There is no distension. Palpations: Abdomen is soft. Tenderness: There is no abdominal tenderness. There is no guarding. Musculoskeletal:      Right lower leg: Edema present. Left lower leg: Edema present. Comments: 3+ pitting edema to the bilateral knees   Neurological:      Mental Status: He is alert and oriented to person, place, and time.    Psychiatric:         Behavior: Behavior normal.         Review of Systems  - Negative except Interval History    LABS:    CBC:   Recent Labs     06/19/21 1808 06/20/21 0216 06/21/21  0631   WBC 6.3 5.5 6.4   HGB 11.2* 10.4* 9.6*   HCT 34.5* 31.9* 29.3*    136 126*   MCV 92.4 91.8 91.8     Renal:    Recent Labs     06/19/21 1808 06/20/21 0216 06/21/21  0631    142 138   K 3.8 3.7 4.0    103 101   CO2 29 28 26   BUN 44* 38* 35*   CREATININE 1.8* 1.6* 1.5*   GLUCOSE 118* 201* 105*   CALCIUM 9.0 8.5 8.0*   ANIONGAP 12 11 11     Hepatic:   Recent Labs     06/19/21 1808 06/19/21 2148 06/20/21  0216   AST 15  --  13*   ALT 10  --  9*   BILITOT 0.5  --  0.4   BILIDIR  --   --  <0.2   PROT 8.6* 7.5 7.3   LABALBU 3.9 3.0* 3.2*   ALKPHOS 75  --  65     Troponin:   Recent Labs fluids at this point pending urine studies. -FENa =1.8%, intrinsic renal disease    #Troponinemia  -No chest pain or shortness of breath.  EKG unchanged from baseline.  He has a first-degree heart block which is chronic.  Troponin significantly lower than his previous admissions.   - [facility] 8/19/20 Patient with 5 vessel bypass in 4 of the grafts are in really great shape     #Dizziness  -Unclear etiology.  Self-limited episode. Does have a significant cardiac history. Could be due to bradycardic episode vs acute infection   -Electrophysiology consulted, appreciate recs     #CAD status post CABG  -Patient takes aspirin 81 mg at home     #Chronic indwelling Iglesias catheter.  -Encourage outpatient follow-up with urology for further management including possible suprapubic catheter. #Chronic lower extremity edema    This patient has been staffed and discussed with Kenia Shearer MD.     Code Status: Full code  FEN:  ADULT DIET; Regular; Low Potassium (Less than 3000 mg/day)  PPX: Subcu heparin  DISPO: PAPO Maguire MD, PGY- 1  06/21/21  2:20 PM    This note was likely completed using voice recognition technology and may contain unintended errors. Plan:     Plan is to keep patient on Merrem for now. Infectious disease has been consulted as patient does have a chronic indwelling Iglesias catheter to investigate whether his past and future cultures are likely due to colonization versus active infection. For patient's self resolved episode of dizziness, patient does have bradycardia while he has been inpatient, electrophysiology has been consulted for further investigation into patient's bradycardia and self-reported dizziness.

## 2021-06-21 NOTE — PLAN OF CARE
Problem: Falls - Risk of:  Goal: Will remain free from falls  Description: Will remain free from falls  Outcome: Ongoing  Note: Pt is at risk for falls. Fall precautions in place. Call light in reach, bed alarm is on, bed locked and in the lowest position. Does not attempt to get oob without assistance. Will continue to monitor. Problem: Skin Integrity:  Goal: Will show no infection signs and symptoms  Description: Will show no infection signs and symptoms  Outcome: Ongoing  Note: Pt at risk for skin breakdown. Skin assessed this shift. Previous existing skin issues noted. No new breakdown noted. Pressure ulcer precautions in place. Patient on specialty mattress. Patient turned and repositioned q 2 hours. Will continue to monitor. Problem: Pain:  Goal: Pain level will decrease  Description: Pain level will decrease  Outcome: Ongoing  Note: No c/o pain today.

## 2021-06-21 NOTE — CONSULTS
[facility]   Cardiac Electrophysiology Consultation   Date: 6/21/2021  Admit Date:  6/19/2021  Reason for Consultation:   Consult Requesting Physician: Pola Mcdermott MD     Chief Complaint   Patient presents with    Dizziness     HAD EPISODE OF DIZZINESS EALIER TODAY, NOT SURE HOW LONG IT LASTED     HPI: Gilson Montoya is a 79 y.o. Clenton Reas past medical history significant for CAD, status post CABG, heart failure preserved LV ejection fraction, LV ejection fraction 55 to 60%, hypertension, hyperlipidemia, BPH, mildly positive stress test by 2020, status post coronary angiogram by Dr. Aly Love which showed patent coronaries and grafts, was admitted to the hospital secondary to an episode of dizziness. Apparently, he was at his usual state of health. Last Friday, he was lying down at his home. Suddenly he felt that he has some abnormal sensation in his epigastric region which ascends towards his chest and eventually towards his neck. He felt lightheadedness at the time. This was also associated with sick sensation in his belly. He states that this might have lasted for few seconds and then resolved on its own. He denies any recurrence. He denies any previous episodes. He denies any history of syncope. Of note, last year during the [facility]ler he had an episode of sinus pause of 5 seconds.  As a follow-up, he had a 3-day CAM monitor which predominantly showed sinus bradycardia with isolated episode of atrial tachycardia lasting for about three beats at a rate of 100 bpm.      Past Medical History:   Diagnosis Date    BPH (benign prostatic hyperplasia)     Carotid stenosis 12/2013    JESU 04-97% stenosis; LICA 06-63% stenosis    Cellulitis 12/2013    LLE    Chronic back pain     Diverticular disease     Erectile dysfunction     GERD (gastroesophageal reflux disease)     History of atrial fibrillation     Hypercholesteremia     Hypertension     Lower GI bleed     MDRO (multiple drug resistant organisms) resistance 10/26/2019    urine    Neuromuscular disorder (HCC)     spasticity    Renal insufficiency     Risk for falls     uses walker    Tinea corporis 12/2013    LLE    Vitamin B12 deficiency         Past Surgical History:   Procedure Laterality Date    BACK SURGERY  2006    lower lumbar    CORONARY ARTERY BYPASS GRAFT  12/13/2013    CABG x 5 (Dr Joe Leong), svg to diag, om1 and 3, distal rca, kelly to lad.  CYSTOSCOPY N/A 1/10/2019    CYSTOSCOPY performed by Maribel Perea MD at [place] [address] Right 5/1/2015    [facility]    SIGMOIDOSCOPY N/A 6/11/2020    SIGMOIDOSCOPY DIAGNOSTIC FLEXIBLE performed [facility]cho [facility]anon [facility] 5/4/2020    EGD BIOPSY performed by Moncho Castanon MD at [facility] ENDOSCOPY       Allergies   Allergen Reactions    Bactrim [Sulfamethoxazole-Trimethoprim] Rash       Social History:  Reviewed. reports that he quit smoking about 51 years ago. He has never used smokeless tobacco. He reports that he does not drink alcohol and does not use drugs. Family History:  Reviewed. family history includes Heart Disease in his father. No premature CAD. Review of System:  All other systems reviewed except for that noted above. Pertinent negatives and positives are:     Objective      · General: negative for fever, chills   · Ophthalmic ROS: negative for - eye pain or loss of vision  · ENT ROS: negative for - headaches, sore throat   · Respiratory: negative for - cough, sputum  · Cardiovascular: Reviewed in HPI  · Gastrointestinal: negative for - abdominal pain, diarrhea, N/V  · Hematology: negative for - bleeding, blood clots, bruising or jaundice  · Genito-Urinary:  negative for - Dysuria or incontinence  · Musculoskeletal: negative for - Joint swelling, muscle pain  · Neurological: negative for - confusion, dizziness, headaches   · Psychiatric: No anxiety, no depression.   · Dermatological: HCT 34.5* 31.9* 29.3*   MCV 92.4 91.8 91.8    136 126*     Lab Results   Component Value Date    CKTOTAL 93 06/19/2021    TROPONINI 0.05 06/20/2021     No results found for: BNP  Lab Results   Component Value Date    PROTIME 14.2 04/22/2021    PROTIME 14.1 02/14/2021    PROTIME 12.0 12/17/2019    INR 1.22 04/22/2021    INR 1.21 02/14/2021    INR 1.03 12/17/2019     Lab Results   Component Value Date    CHOL 102 06/05/2021    HDL 43 06/05/2021    TRIG 54 06/05/2021       Diagnostic and imaging results reviewed. ECG: Sinus rhythm  Echo: Normal LV ejection fraction  Cath: Patent grafts    I independently reviewed the ECG and telemetry.      Scheduled Meds:   insulin lispro  0-6 Units Subcutaneous TID WC    insulin lispro  0-3 Units Subcutaneous Nightly    meropenem  1,000 mg Intravenous Q12H    sodium chloride flush  5-40 mL Intravenous 2 times per day    heparin (porcine)  5,000 Units Subcutaneous 3 times per day    aspirin  81 mg Oral Daily    pantoprazole  40 mg Oral QAM AC    tamsulosin  0.8 mg Oral Daily    vitamin B-12  1,000 mcg Oral Daily    gabapentin  400 mg Oral BID    atorvastatin  80 mg Oral Nightly     Continuous Infusions:   dextrose      dextrose      sodium chloride       PRN Meds:.glucose, dextrose, glucagon (rDNA), dextrose, zolpidem, glucose, dextrose, glucagon (rDNA), dextrose, sodium chloride flush, sodium chloride, ondansetron **OR** ondansetron, polyethylene glycol, acetaminophen **OR** acetaminophen, melatonin     Assessment:   Patient Active Problem List    Diagnosis Date Noted    [address] [facility]) [address]     Altered mental status     Hyperkalemia     Encephalopathy acute 04/21/2021    Acute renal failure superimposed on stage 3 chronic kidney disease ([address] 02/15/2021    Urinary tract infection associated with indwelling urethral catheter ([facility] 02/15/2021    Acute cystitis with hematuria 02/14/2021    Abnormal angiogram 08/27/2020    H/O angiography he may have underlying sinus node disease. However, as of now I do not have any documentation to prove that. He is on Flomax, Lasix and amlodipine -three vasodilators which can also produce orthostatic hypotension. Having said that, his clinical symptom is not classically consistent with orthostatic hypotension. He does have very mild troponin leak which is flat in the setting of acute kidney injury. There is no history suggestive of ischemia. EKG is benign. Lets continue his current medications. Secondary to underlying sinus bradycardia, he is not on beta-blockers in spite of having history of CAD and status post CABG. We will place her 2 weeks of external monitor to capture any events/evidence of sick sinus syndrome. Thank you for allowing me to participate in the care of Rm Gilles Block . If you have any questions/comments, please do not hesitate to contact us. Jaquan Mac MD   Cardiac Electrophysiology  [address] [place] [address]    For any EP related issues after 5 PM, contact [facility] on call cardiology through .

## 2021-06-22 LAB
ANION GAP SERPL CALCULATED.3IONS-SCNC: 9 MMOL/L (ref 3–16)
BASOPHILS ABSOLUTE: 0.1 K/UL (ref 0–0.2)
BASOPHILS RELATIVE PERCENT: 0.9 %
BUN BLDV-MCNC: 31 MG/DL (ref 7–20)
CALCIUM SERPL-MCNC: 8.5 MG/DL (ref 8.3–10.6)
CHLORIDE BLD-SCNC: 104 MMOL/L (ref 99–110)
CO2: 26 MMOL/L (ref 21–32)
CREAT SERPL-MCNC: 1.6 MG/DL (ref 0.8–1.3)
EOSINOPHILS ABSOLUTE: 0.6 K/UL (ref 0–0.6)
EOSINOPHILS RELATIVE PERCENT: 9.7 %
ESTIMATED AVERAGE GLUCOSE: 116.9 MG/DL
GFR AFRICAN AMERICAN: 51
GFR NON-AFRICAN AMERICAN: 42
GLUCOSE BLD-MCNC: 107 MG/DL (ref 70–99)
GLUCOSE BLD-MCNC: 121 MG/DL (ref 70–99)
GLUCOSE BLD-MCNC: 90 MG/DL (ref 70–99)
GLUCOSE BLD-MCNC: 92 MG/DL (ref 70–99)
GLUCOSE BLD-MCNC: 96 MG/DL (ref 70–99)
HBA1C MFR BLD: 5.7 %
HCT VFR BLD CALC: 30 % (ref 40.5–52.5)
HEMOGLOBIN: 10 G/DL (ref 13.5–17.5)
KAPPA, FREE LIGHT CHAINS, SERUM: 130.23 MG/L (ref 3.3–19.4)
KAPPA/LAMBDA RATIO: 3.21 (ref 0.26–1.65)
KAPPA/LAMBDA TEST COMMENT: ABNORMAL
LAMBDA, FREE LIGHT CHAINS, SERUM: 40.53 MG/L (ref 5.71–26.3)
LYMPHOCYTES ABSOLUTE: 1.3 K/UL (ref 1–5.1)
LYMPHOCYTES RELATIVE PERCENT: 21.3 %
MCH RBC QN AUTO: 30.5 PG (ref 26–34)
MCHC RBC AUTO-ENTMCNC: 33.3 G/DL (ref 31–36)
MCV RBC AUTO: 91.6 FL (ref 80–100)
MONOCYTES ABSOLUTE: 0.5 K/UL (ref 0–1.3)
MONOCYTES RELATIVE PERCENT: 7.8 %
NEUTROPHILS ABSOLUTE: 3.6 K/UL (ref 1.7–7.7)
NEUTROPHILS RELATIVE PERCENT: 60.3 %
PDW BLD-RTO: 17.6 % (ref 12.4–15.4)
PERFORMED ON: ABNORMAL
PERFORMED ON: ABNORMAL
PERFORMED ON: NORMAL
PERFORMED ON: NORMAL
PLATELET # BLD: 130 K/UL (ref 135–450)
PMV BLD AUTO: 10.2 FL (ref 5–10.5)
POTASSIUM REFLEX MAGNESIUM: 4 MMOL/L (ref 3.5–5.1)
PROTEIN PROTEIN: 0.01 G/DL
PROTEIN PROTEIN: 7 MG/DL
RBC # BLD: 3.27 M/UL (ref 4.2–5.9)
SODIUM BLD-SCNC: 139 MMOL/L (ref 136–145)
WBC # BLD: 5.9 K/UL (ref 4–11)

## 2021-06-22 PROCEDURE — 6360000002 HC RX W HCPCS: Performed by: STUDENT IN AN ORGANIZED HEALTH CARE EDUCATION/TRAINING PROGRAM

## 2021-06-22 PROCEDURE — 99232 SBSQ HOSP IP/OBS MODERATE 35: CPT | Performed by: NURSE PRACTITIONER

## 2021-06-22 PROCEDURE — 6370000000 HC RX 637 (ALT 250 FOR IP): Performed by: STUDENT IN AN ORGANIZED HEALTH CARE EDUCATION/TRAINING PROGRAM

## 2021-06-22 PROCEDURE — 97161 PT EVAL LOW COMPLEX 20 MIN: CPT

## 2021-06-22 PROCEDURE — 99232 SBSQ HOSP IP/OBS MODERATE 35: CPT | Performed by: INTERNAL MEDICINE

## 2021-06-22 PROCEDURE — 84156 ASSAY OF PROTEIN URINE: CPT

## 2021-06-22 PROCEDURE — 84166 PROTEIN E-PHORESIS/URINE/CSF: CPT

## 2021-06-22 PROCEDURE — 80048 BASIC METABOLIC PNL TOTAL CA: CPT

## 2021-06-22 PROCEDURE — 6370000000 HC RX 637 (ALT 250 FOR IP): Performed by: INTERNAL MEDICINE

## 2021-06-22 PROCEDURE — 36415 COLL VENOUS BLD VENIPUNCTURE: CPT

## 2021-06-22 PROCEDURE — 1200000000 HC SEMI PRIVATE

## 2021-06-22 PROCEDURE — 85025 COMPLETE CBC W/AUTO DIFF WBC: CPT

## 2021-06-22 PROCEDURE — 84155 ASSAY OF PROTEIN SERUM: CPT

## 2021-06-22 PROCEDURE — 97530 THERAPEUTIC ACTIVITIES: CPT

## 2021-06-22 PROCEDURE — 2580000003 HC RX 258: Performed by: STUDENT IN AN ORGANIZED HEALTH CARE EDUCATION/TRAINING PROGRAM

## 2021-06-22 PROCEDURE — 51702 INSERT TEMP BLADDER CATH: CPT

## 2021-06-22 PROCEDURE — 84165 PROTEIN E-PHORESIS SERUM: CPT

## 2021-06-22 RX ADMIN — Medication 10 ML: at 20:51

## 2021-06-22 RX ADMIN — HEPARIN SODIUM 5000 UNITS: 5000 INJECTION INTRAVENOUS; SUBCUTANEOUS at 14:52

## 2021-06-22 RX ADMIN — CYANOCOBALAMIN TAB 1000 MCG 1000 MCG: 1000 TAB at 10:22

## 2021-06-22 RX ADMIN — HEPARIN SODIUM 5000 UNITS: 5000 INJECTION INTRAVENOUS; SUBCUTANEOUS at 20:50

## 2021-06-22 RX ADMIN — ASPIRIN 81 MG: 81 TABLET, CHEWABLE ORAL at 10:23

## 2021-06-22 RX ADMIN — Medication 10 ML: at 10:23

## 2021-06-22 RX ADMIN — ATORVASTATIN CALCIUM 80 MG: 80 TABLET, FILM COATED ORAL at 20:50

## 2021-06-22 RX ADMIN — TAMSULOSIN HYDROCHLORIDE 0.8 MG: 0.4 CAPSULE ORAL at 10:23

## 2021-06-22 RX ADMIN — PANTOPRAZOLE SODIUM 40 MG: 40 TABLET, DELAYED RELEASE ORAL at 05:47

## 2021-06-22 RX ADMIN — GABAPENTIN 400 MG: 400 CAPSULE ORAL at 20:50

## 2021-06-22 RX ADMIN — HEPARIN SODIUM 5000 UNITS: 5000 INJECTION INTRAVENOUS; SUBCUTANEOUS at 05:47

## 2021-06-22 RX ADMIN — GABAPENTIN 400 MG: 400 CAPSULE ORAL at 10:22

## 2021-06-22 ASSESSMENT — PAIN SCALES - GENERAL
PAINLEVEL_OUTOF10: 0

## 2021-06-22 ASSESSMENT — PAIN DESCRIPTION - PROGRESSION: CLINICAL_PROGRESSION: NOT CHANGED

## 2021-06-22 NOTE — CARE COORDINATION
Presented with dizziness and associated lightheadedness. Has completed 3 days of Meropenum IV tx. Patient admitted with URI, ALIS, and ID for wound care. Patient from home. Active with [facility] for skilled care and [place] for health aide services. Will go home with resumption of care. Will need transportation to home. PT today was 14/24 and OT was 19/24. Pt from home w/  [facility]/ [facility]: And [facility]  , will plan home with resumption of care: And  Transportation needed . Has  All needed DME at home. Has been to [facility] in the past.  CM will continue to follow patient until discharge.   Electronically signed by Ciarra Wen RN on 6/22/2021 at 1:19 PM           :

## 2021-06-22 NOTE — PROGRESS NOTES
Internal Medicine  PGY 1  Progress Note    Admit Date: 6/19/2021  Diet: ADULT DIET; Regular; Low Potassium (Less than 3000 mg/day)  Adult Oral Nutrition Supplement; Diabetic Oral Supplement    CC: Dizziness    Interval history:   Overnight, there were no acute events. Patient was seen this morning in bed. Patient endorses he feels well and has no complaints. He endorses the episode of dizziness that brought him into the hospital has completely resolved. Patient endorsed that he does not have any symptoms of UTI including dysuria, urinary urgency or frequency as as well as suprapubic pain. Patient denies fevers, chills, nausea, vomiting, chest pain, shortness of breath, diarrhea, constipation, dysuria, urinary frequency or urgency.      Medications:     Scheduled Meds:   insulin lispro  0-6 Units Subcutaneous TID WC    insulin lispro  0-3 Units Subcutaneous Nightly    sodium chloride flush  5-40 mL Intravenous 2 times per day    heparin (porcine)  5,000 Units Subcutaneous 3 times per day    aspirin  81 mg Oral Daily    pantoprazole  40 mg Oral QAM AC    tamsulosin  0.8 mg Oral Daily    vitamin B-12  1,000 mcg Oral Daily    gabapentin  400 mg Oral BID    atorvastatin  80 mg Oral Nightly     Continuous Infusions:   dextrose      dextrose      sodium chloride       PRN Meds:glucose, dextrose, glucagon (rDNA), dextrose, zolpidem, glucose, dextrose, glucagon (rDNA), dextrose, sodium chloride flush, sodium chloride, ondansetron **OR** ondansetron, polyethylene glycol, acetaminophen **OR** acetaminophen, melatonin    Objective:   Vitals:   T-max:  Patient Vitals for the past 8 hrs:   BP Temp Temp src Pulse Resp SpO2 Weight   06/22/21 0845 125/63 98.6 °F (37 °C) Axillary 55 18 94 % --   06/22/21 0552 -- -- -- -- -- -- 207 lb (93.9 kg)   06/22/21 0316 117/64 98 °F (36.7 °C) Oral 54 16 94 % --       Intake/Output Summary (Last 24 hours) at 6/22/2021 1058  Last data filed at 6/22/2021 0558  Gross per 24 hour Intake 780 ml   Output 4475 ml   Net -3695 ml       Physical Exam  HENT:      Head: Normocephalic and atraumatic. Mouth/Throat:      Mouth: Mucous membranes are moist.   Eyes:      General: No scleral icterus. Extraocular Movements: Extraocular movements intact. Pupils: Pupils are equal, round, and reactive to light. Cardiovascular:      Rate and Rhythm: Normal rate and regular rhythm. Pulses: Normal pulses. Heart sounds: Murmur heard. No friction rub. No gallop. Comments: Systolic murmur with mild radiation to the carotids appreciated. Pulmonary:      Effort: Pulmonary effort is normal. No respiratory distress. Breath sounds: Normal breath sounds. No wheezing or rales. Abdominal:      General: Bowel sounds are normal. There is no distension. Palpations: Abdomen is soft. Tenderness: There is no abdominal tenderness. There is no guarding. Musculoskeletal:      Right lower leg: Edema present. Left lower leg: Edema present. Comments: 3+ pitting edema to the bilateral knees   Neurological:      Mental Status: He is alert and oriented to person, place, and time.    Psychiatric:         Behavior: Behavior normal.         Review of Systems  - Negative except Interval History    LABS:    CBC:   Recent Labs     06/20/21  0216 06/21/21  0631 06/22/21  0635   WBC 5.5 6.4 5.9   HGB 10.4* 9.6* 10.0*   HCT 31.9* 29.3* 30.0*    126* 130*   MCV 91.8 91.8 91.6     Renal:    Recent Labs     06/20/21  0216 06/21/21  0631 06/22/21  0635    138 139   K 3.7 4.0 4.0    101 104   CO2 28 26 26   BUN 38* 35* 31*   CREATININE 1.6* 1.5* 1.6*   GLUCOSE 201* 105* 96   CALCIUM 8.5 8.0* 8.5   ANIONGAP 11 11 9     Hepatic:   Recent Labs     06/19/21  1808 06/19/21  2148 06/20/21  0216   AST 15  --  13*   ALT 10  --  9*   BILITOT 0.5  --  0.4   BILIDIR  --   --  <0.2   PROT 8.6* 7.5 7.3   LABALBU 3.9 3.0* 3.2*   ALKPHOS 75  --  65     Troponin:   Recent Labs 06/19/21  2148 06/20/21  0216 06/20/21  0640   TROPONINI 0.05* 0.05* 0.05*     BNP: No results for input(s): BNP in the last 72 hours. Lipids: No results for input(s): CHOL, HDL in the last 72 hours. Invalid input(s): LDLCALCU, TRIGLYCERIDE  ABGs:  No results for input(s): PHART, DRY4CPS, PO2ART, CPD9LQT, BEART, THGBART, C7YKBNVV, HGA5FEB in the last 72 hours. INR: No results for input(s): INR in the last 72 hours. Lactate: No results for input(s): LACTATE in the last 72 hours. Cultures:  -----------------------------------------------------------------  RAD:   US ABDOMEN LIMITED   Final Result   IMPRESSION :      Study limitations, as above. Mild hepatomegaly with heterogeneous echogenicity, nonspecific. CT ABDOMEN PELVIS WO CONTRAST Additional Contrast? None   Final Result   1. Unchanged calculi left kidney. No evidence of renal obstruction or staghorn calculi. 2. Unchanged small left exudative pleural effusion. 3. Hiatal hernia. 4. Cholelithiasis. 5. Fecal impaction rectosigmoid colon. XR CHEST PORTABLE   Final Result   1. Left lower lobe atelectasis, airspace disease and effusion redemonstrated. 2. No evidence of congestive failure      CT Head WO Contrast   Final Result   1. Brain atrophy, Otherwise normal          Assessment:   Patient is a 50-year-old male with past medical history of CKD3, chronic indwelling cathether, CAD, HTN who was admitted after self resolved episode of dizziness and found to have MDRO UTI and ALIS.     #Acute Cystitis without hematuria with chronic indwelling catheter   - He had a analysis drawn by PCP on 6/5.  Noted to have 159 WBCs, urine culture positive for MDRO Proteus Pennis   - Is at risk for colonization, no leukocytosis, afebrile   -Infectious disease consulted, appreciate recs: D/c meropenem on 6/22 (will have completed 3 days), No further ID w/u, treatment    #ALIS  Likely secondary to suspected ATN  Patient has history of CKD stage III.  His baseline is around 1.2 last around 6/5/2021.    3+ pitting edema on exam.  We will refrain from giving IV fluids at this point pending urine studies. -FENa =1.8%, intrinsic renal disease    #Troponinemia  -No chest pain or shortness of breath.  EKG unchanged from baseline.  He has a first-degree heart block which is chronic.  Troponin significantly lower than his previous admissions.   - [facility] 8/19/20 Patient with 5 vessel bypass in 4 of the grafts are in really great shape     #Dizziness  -Unclear etiology.  Self-limited episode. Does have a significant cardiac history. Could be due to bradycardic episode vs acute infection   -Electrophysiology consulted, appreciate recs: Patient may have underlying sinus node disease, continue current medications, patient will need an external monitor for 2 weeks to capture any events/evidence of sick sinus syndrome on discharge.     #CAD status post CABG  -Patient takes aspirin 81 mg at home     #Chronic indwelling Iglesias catheter.  -Encourage outpatient follow-up with urology for further management including possible suprapubic catheter. #Chronic lower extremity edema    This patient has been staffed and discussed with Goldie Piedra MD.     Code Status: Full code  FEN:  ADULT DIET; Regular; Low Potassium (Less than 3000 mg/day)  PPX: Subcu heparin  DISPO: PAPO Escalante MD, PGY- 1  06/22/21  10:58 AM    This note was likely completed using voice recognition technology and may contain unintended errors. Plan: At this time, antibiotics have been discontinued due to the fact that patient does not have any urinary symptoms suggesting a urinary tract infection. Patient was seen and evaluated by electrophysiology who endorsed patient may have a suspected sinus node problem. Therefore, patient will need a 2-week external monitor. In terms of patient's ALIS, FENa is suggestive of intrinsic disease which could be ATN.   At this time, creatinine is still above baseline of 1.2. We will continue to monitor, and given the kidney ample time to recover. Patient did make 4.5 L of urine over 24-hour period.

## 2021-06-22 NOTE — PLAN OF CARE
Problem: Falls - Risk of:  Goal: Will remain free from falls  Description: Will remain free from falls  6/22/2021 0408 by Adele Avendano RN  Outcome: Ongoing     Problem: Falls - Risk of:  Goal: Absence of physical injury  Description: Absence of physical injury  6/22/2021 0408 by Adele Avendano RN  Outcome: Ongoing     Problem: Falls - Risk of:  Goal: Absence of physical injury  Description: Absence of physical injury  6/22/2021 0104 by Adele Avendano RN  Outcome: Ongoing     Problem: Skin Integrity:  Goal: Will show no infection signs and symptoms  Description: Will show no infection signs and symptoms  6/22/2021 0408 by Adele Avendano RN  Outcome: Ongoing     Problem: Skin Integrity:  Goal: Will show no infection signs and symptoms  Description: Will show no infection signs and symptoms  6/22/2021 0104 by Adele Avendano RN  Outcome: Ongoing

## 2021-06-22 NOTE — PROGRESS NOTES
ID Follow-up NOTE    CC:   Bacteriuria, Dizziness  Antibiotics: None     Admit Date: 6/19/2021  Hospital Day: 4    Subjective:     Patient feels good -   No complaint  No dizziness, no dysuria / urinary sx    Objective:     Patient Vitals for the past 8 hrs:   BP Temp Temp src Pulse Resp SpO2 Weight   06/22/21 0845 125/63 98.6 °F (37 °C) Axillary 55 18 94 % --   06/22/21 0552 -- -- -- -- -- -- 207 lb (93.9 kg)   06/22/21 0316 117/64 98 °F (36.7 °C) Oral 54 16 94 % --     I/O last 3 completed shifts: In: 1020 [P.O.:1020]  Out: 4475 [Urine:4475]  No intake/output data recorded. EXAM:  GENERAL: No apparent distress.   RA  HEENT: Membranes moist, no oral lesion  NECK:  Supple, no lymphadenopathy  LUNGS: Clear b/l, no rales, no dullness  CARDIAC: RRR, no murmur appreciated  ABD:  + BS, soft / NT -  No suprapubic tenderness, no CVAT  EXT:  No rash, no edema, no lesions  NEURO: No focal neurologic findings  PSYCH: Orientation, sensorium, mood normal  LINES:  Peripheral iv       Data Review:  Lab Results   Component Value Date    WBC 5.9 06/22/2021    HGB 10.0 (L) 06/22/2021    HCT 30.0 (L) 06/22/2021    MCV 91.6 06/22/2021     (L) 06/22/2021     Lab Results   Component Value Date    CREATININE 1.6 (H) 06/22/2021    BUN 31 (H) 06/22/2021     06/22/2021    K 4.0 06/22/2021     06/22/2021    CO2 26 06/22/2021       Hepatic Function Panel:   Lab Results   Component Value Date    ALKPHOS 65 06/20/2021    ALT 9 06/20/2021    AST 13 06/20/2021    PROT 7.3 06/20/2021    BILITOT 0.4 06/20/2021    BILIDIR <0.2 06/20/2021    IBILI see below 06/20/2021    LABALBU 3.2 06/20/2021       Micro:  6/20 Urine cult - no growth     6/5 Urine cult >100k P penneri  Proteus penneri         Antibiotic Interpretation YANCY   amoxicillin-clavulanate Sensitive <=8/4 mcg/mL   ampicillin Resistant >16 mcg/mL   aztreonam Resistant >16 mcg/mL   ceFAZolin Resistant >16 mcg/mL   cefepime Sensitive <=2 mcg/mL   cefTRIAXone Resistant >32 mcg/mL   cefuroxime Resistant >16 mcg/mL   ciprofloxacin Resistant >2 mcg/mL   ertapenem Sensitive <=0.5 mcg/mL   gentamicin Resistant >8 mcg/mL   meropenem Sensitive <=1 mcg/mL   nitrofurantoin Resistant 64 mcg/mL   piperacillin-tazobactam Sensitive <=16 mcg/mL   tobramycin Resistant >8 mcg/mL   trimethoprim-sulfamethoxazole Resistant >2/38 mcg/mL      Imagin/21 Abd u/s  Study limitations, as above. Mild hepatomegaly with heterogeneous echogenicity, nonspecific.       Abd / Pelvic CT:  1. Unchanged calculi left kidney. No evidence of renal obstruction or staghorn calculi. 2. Unchanged small left exudative pleural effusion. 3. Hiatal hernia. 4. Cholelithiasis. 5. Fecal impaction rectosigmoid colon. Scheduled Meds:   insulin lispro  0-6 Units Subcutaneous TID WC    insulin lispro  0-3 Units Subcutaneous Nightly    sodium chloride flush  5-40 mL Intravenous 2 times per day    heparin (porcine)  5,000 Units Subcutaneous 3 times per day    aspirin  81 mg Oral Daily    pantoprazole  40 mg Oral QAM AC    tamsulosin  0.8 mg Oral Daily    vitamin B-12  1,000 mcg Oral Daily    gabapentin  400 mg Oral BID    atorvastatin  80 mg Oral Nightly       Continuous Infusions:   dextrose      dextrose      sodium chloride         PRN Meds:  glucose, dextrose, glucagon (rDNA), dextrose, zolpidem, glucose, dextrose, glucagon (rDNA), dextrose, sodium chloride flush, sodium chloride, ondansetron **OR** ondansetron, polyethylene glycol, acetaminophen **OR** acetaminophen, melatonin      Assessment:     Hx CAD, HTN, BPH, HL, nephrolithiasis     Presents with dizziness, assoc lightheadedness  Bacteriuria, no UTI (no pyuria, no sx)      Plan:     Cont off meropenem (discontinued, has completed 3 days)  No further ID w/u, treatment     Medical Decision Making:   The following items were considered in medical decision making:  Discussion of patient care with other providers  Reviewed clinical lab tests  Reviewed radiology tests  Reviewed other diagnostic tests/interventions  Independent review of radiologic images  Microbiology cultures and other micro tests reviewed       Discussed with pt  Erma Decker MD

## 2021-06-22 NOTE — PROGRESS NOTES
Physical Therapy    Facility/Department: [address]  Initial Assessment    NAME: Josué Mark  : 1940  MRN: [id number]    Date of Service: 2021    Discharge Recommendations:Tushar Block scored a 14/24 on the AM-PAC short mobility form. Current research shows that an AM-PAC score of 18 or greater is typically associated with a discharge to the patient's home setting. Based on the patient's AM-PAC score and their current functional mobility deficits, it is recommended that the patient have 2-3 sessions per week of Physical Therapy at d/c to increase the patient's independence. At this time, this patient demonstrates the endurance and safety to discharge home with  (home  services) and a follow up treatment frequency of 2-3x/wk. Please see assessment section for further patient specific details. HOME HEALTH CARE: LEVEL 1 STANDARD    - Initial home health evaluation to occur within 24-48 hours, in patient home   - Therapy to evaluate with goal of regaining prior level of functioning   - Therapy to evaluate if patient has [address] needs for personal care    If patient discharges prior to next session this note will serve as a discharge summary. Please see below for the latest assessment towards goals. Assessment   Assessment: 80 yo readmitted 21 for ALIS. Pt demo mobility close to his reported baseline of independent lift chair to/from wheelchair txfers at home. We are unable to completely simulate pt's set up at home here on IP unit. Pt plans to return home at discharge. AMPAC score is artificially low due to scoring for ambulation but pt is non ambulatory at baseline. If home, recommend increased assist initially for safety, home PT. No new DME needs identified.   Treatment Diagnosis: mobility impairment due to ALIS  Decision Making: Low Complexity  Patient Education: Pt educated on PT role, importance of OOB mobility, need to call for assist to get up and he verbalized understanding. Activity Tolerance  Activity Tolerance: Patient Tolerated treatment well       Patient Diagnosis(es): The primary encounter diagnosis was Dizziness. Diagnoses of Urinary tract infection associated with indwelling urethral catheter, initial encounter ([facility] and ALIS (acute kidney injury) ([facility] were also pertinent to this visit. has a past medical history of BPH (benign prostatic hyperplasia), Carotid stenosis, Cellulitis, Chronic back pain, Diverticular disease, Erectile dysfunction, GERD (gastroesophageal reflux disease), History of atrial fibrillation, Hypercholesteremia, Hypertension, Lower GI bleed, MDRO (multiple drug resistant organisms) resistance, Neuromuscular disorder ([facility] Renal insufficiency, Risk for falls, Tinea corporis, and Vitamin B12 deficiency. has a past surgical history that includes back surgery (2006); Foot surgery; Coronary artery bypass graft (12/13/2013); hip surgery (Right, 5/1/2015); Cystoscopy (N/A, 1/10/2019); Upper gastrointestinal endoscopy (N/A, 5/4/2020); and sigmoidoscopy (N/A, 6/11/2020). Restrictions  Position Activity Restriction  Other position/activity restrictions: up with assist     Vision/Hearing  Vision: Within Functional Limits  Hearing: Within functional limits       Subjective  General  Chart Reviewed: Yes  Additional Pertinent Hx: 79 y.o. male with a history of BPH and indwelling catheter, recurrent UTIs, CAD s/p CABG, A. fib, HTN, HLD, CKD3, recent admit to [facility] 04/21 with d/c to SNF 4/23/21 who presented to ED 6/19/21 for dizziness. Work up positive UTI with indwelling catheter/ALIS. Family / Caregiver Present: No  Diagnosis: dizziness/UTI/ALIS  Follows Commands: Within Functional Limits  Subjective  Subjective: Pt found supine in bed and agreeable to PT. \" I hope I'm going home today.  \"  Pain Screening  Patient Currently in Pain: Denies         Orientation  Orientation  Overall Orientation Status: Within Functional Limits Score  AM-PAC Inpatient Mobility Raw Score : 14 (06/22/21 1209)  AM-PAC Inpatient T-Scale Score : 38.1 (06/22/21 1209)  Mobility Inpatient CMS 0-100% Score: 61.29 (06/22/21 1209)  Mobility Inpatient CMS G-Code Modifier : CL (06/22/21 1209)          Goals  Short term goals  Time Frame for Short term goals: discharge  Short term goal 1: bed to chair supervision  Patient Goals   Patient goals : return home       Therapy Time   Individual Concurrent Group Co-treatment   Time In 1110         Time Out 1140         Minutes 30           Timed Code Treatment Minutes:20       Total Treatment Minutes:  [address], PT

## 2021-06-22 NOTE — PLAN OF CARE
Problem: Falls - Risk of:  Goal: Will remain free from falls  Description: Will remain free from falls  6/22/2021 0104 by Cuca Rush RN  Outcome: Ongoing  6/21/2021 1845 by Cami Goldstein RN  Outcome: Ongoing  Note: Pt is at risk for falls. Fall precautions in place. Call light in reach, bed alarm is on, bed locked and in the lowest position. Does not attempt to get oob without assistance. Will continue to monitor. Problem: Falls - Risk of:  Goal: Absence of physical injury  Description: Absence of physical injury  Outcome: Ongoing     Problem: Skin Integrity:  Goal: Will show no infection signs and symptoms  Description: Will show no infection signs and symptoms  6/22/2021 0104 by Cuca Rush RN  Outcome: Ongoing  6/21/2021 1845 by Cami Goldstein RN  Outcome: Ongoing  Note: Pt at risk for skin breakdown. Skin assessed this shift. Previous existing skin issues noted. No new breakdown noted. Pressure ulcer precautions in place. Patient on specialty mattress. Patient turned and repositioned q 2 hours. Will continue to monitor.

## 2021-06-22 NOTE — PROGRESS NOTES
Electrophysiology - PROGRESS NOTE    Admit Date: 6/19/2021     Chief Complaint: dizziness, lightheadedness     Interval History:   Patient seen and examined and notes reviewed. Patient is being followed for dizziness, lightheadedness. Patient had been at home, in his wheelchair, started feeling dizziness, lightheadedness and a flushing sensation come over him for which he almost passed out. He also felt nauseated at the same time. Symptoms resolved on their own and lasted several seconds. He did have a 5-second sinus pause during a stress test last year. 3-day CAM worn 8/20/2020-8/24/2020 showed an average HR 59 (), NSR/SB, 1 AT lasting 3 beats. He has remained NSR/SB on telemetry. He current denies any chest pain, shortness of breath, PND, orthopnea or lower extremity edema.     In: 80 [P.O.:780]  Out: 3850    Wt Readings from Last 2 Encounters:   06/22/21 207 lb (93.9 kg)   05/31/21 213 lb (96.6 kg)         Data:   Scheduled Meds:   Scheduled Meds:   insulin lispro  0-6 Units Subcutaneous TID WC    insulin lispro  0-3 Units Subcutaneous Nightly    meropenem  1,000 mg Intravenous Q12H    sodium chloride flush  5-40 mL Intravenous 2 times per day    heparin (porcine)  5,000 Units Subcutaneous 3 times per day    aspirin  81 mg Oral Daily    pantoprazole  40 mg Oral QAM AC    tamsulosin  0.8 mg Oral Daily    vitamin B-12  1,000 mcg Oral Daily    gabapentin  400 mg Oral BID    atorvastatin  80 mg Oral Nightly     Continuous Infusions:   dextrose      dextrose      sodium chloride       PRN Meds:.glucose, dextrose, glucagon (rDNA), dextrose, zolpidem, glucose, dextrose, glucagon (rDNA), dextrose, sodium chloride flush, sodium chloride, ondansetron **OR** ondansetron, polyethylene glycol, acetaminophen **OR** acetaminophen, melatonin  Continuous Infusions:   dextrose      dextrose      sodium chloride         Intake/Output Summary (Last 24 hours) at 6/22/2021 0814  Last data filed at 6/22/2021 0558  Gross per 24 hour   Intake 1020 ml   Output 4475 ml   Net -3455 ml       CBC:   Lab Results   Component Value Date    WBC 5.9 06/22/2021    HGB 10.0 06/22/2021     06/22/2021     BMP:  Lab Results   Component Value Date     06/22/2021    K 4.0 06/22/2021     06/22/2021    CO2 26 06/22/2021    BUN 31 06/22/2021    CREATININE 1.6 06/22/2021    GLUCOSE 96 06/22/2021     INR:   Lab Results   Component Value Date    INR 1.22 04/22/2021    INR 1.21 02/14/2021    INR 1.03 12/17/2019        CARDIAC LABS  ENZYMES:  Recent Labs     06/19/21  2148 06/20/21  0216 06/20/21  0640   TROPONINI 0.05* 0.05* 0.05*     FASTING LIPID PANEL:  Lab Results   Component Value Date    HDL 43 06/05/2021    LDLCALC 48 06/05/2021    TRIG 54 06/05/2021    TSH 3.05 06/05/2021     LIVER PROFILE:  Lab Results   Component Value Date    AST 13 06/20/2021    AST 15 06/19/2021    ALT 9 06/20/2021    ALT 10 06/19/2021       -----------------------------------------------------------------  Telemetry: Personally reviewed NSR/SB    Objective:   Vitals: /64   Pulse 54   Temp 98 °F (36.7 °C) (Oral)   Resp 16   Ht 5' 11\" (1.803 m)   Wt 207 lb (93.9 kg)   SpO2 94%   BMI 28.87 kg/m²   General appearance: alert, appears stated age and cooperative, No acute distress   Eyes: Conjunctiva and pupils normal and reactive  Skin: Skin color, texture, turgor normal. No rashes or ecchymosis.   Neck: no JVD, supple, symmetrical, trachea midline   Lungs: , no accessory muscle use, no respiratory distress  Heart: RRR  Abdomen: soft, non-tender; bowel sounds normal  Extremities: No edema, DP +  Psychiatric: normal insight and affect    Patient Active Problem List:     Hypotension     Light headed     Cellulitis     Essential hypertension     GERD (gastroesophageal reflux disease)     Acute blood loss anemia     Tinea corporis     Carotid stenosis     CAD (coronary artery disease) S/P CABG x 5     Lower GI bleeding     Hip fracture, right (HCC)     Acute right hip pain     Acute low back pain without sciatica     Hypothermia     Complicated UTI (urinary tract infection)     Edema     Problem with urinary catheter (HCC)     Acute encephalopathy     Chronic indwelling Iglesias catheter     Hyperlipidemia     Bilateral lower leg cellulitis     Neurogenic bladder     Bacteriuria     Abnormality of urethral meatus     Gross hematuria     CHF with unknown LVEF (HCC)     Dyspnea     Bradycardia     Pseudomonas infection     Acute renal injury ([address]     Cellulitis of scrotum     Constipation     Encopresis with constipation and overflow incontinence     Abnormal stress test     H/O angiography     Abnormal angiogram     Acute cystitis with hematuria     Acute renal failure superimposed on stage 3 chronic kidney disease (HCC)     Urinary tract infection associated with indwelling urethral catheter (HCC)     Encephalopathy acute     ALIS (acute kidney injury) ([address]     Altered mental status     Hyperkalemia        Assessment & Plan:      1. Dizziness  2. SB  3. CAD  4. UTI    78 y/o man with a h/o HTN, HLD, GERD, carotid stenosis, CAD, s/p CABG, MPI (2020), follows with Dr. Josué Matt,  showed a 5 sec pause, patient also became unresponsive during MPI, LHC (2020) showed 4/5 patent grafts and flow limiting DEANNA to LAD with no opportunity for intervention, who p/w dizziness.      Dizziness  - NSR/SB on tele with no s/s  - EKG shows SB with a long [place]  - Check orthostatic BP/HR QS  - Keep on tele  - 2 week CAM outpatient        Robert Hays [address]

## 2021-06-23 LAB
ALBUMIN SERPL-MCNC: 2.6 G/DL (ref 3.1–4.9)
ALPHA-1-GLOBULIN: 0.3 G/DL (ref 0.2–0.4)
ALPHA-2-GLOBULIN: 0.7 G/DL (ref 0.4–1.1)
ANION GAP SERPL CALCULATED.3IONS-SCNC: 7 MMOL/L (ref 3–16)
BASOPHILS ABSOLUTE: 0 K/UL (ref 0–0.2)
BASOPHILS RELATIVE PERCENT: 0.6 %
BETA GLOBULIN: 1.2 G/DL (ref 0.9–1.6)
BUN BLDV-MCNC: 34 MG/DL (ref 7–20)
CALCIUM SERPL-MCNC: 8.4 MG/DL (ref 8.3–10.6)
CHLORIDE BLD-SCNC: 105 MMOL/L (ref 99–110)
CO2: 27 MMOL/L (ref 21–32)
CREAT SERPL-MCNC: 1.2 MG/DL (ref 0.8–1.3)
EKG ATRIAL RATE: 51 BPM
EKG DIAGNOSIS: NORMAL
EKG P AXIS: 52 DEGREES
EKG P-R INTERVAL: 268 MS
EKG Q-T INTERVAL: 456 MS
EKG QRS DURATION: 96 MS
EKG QTC CALCULATION (BAZETT): 420 MS
EKG R AXIS: 6 DEGREES
EKG T AXIS: 32 DEGREES
EKG VENTRICULAR RATE: 51 BPM
EOSINOPHILS ABSOLUTE: 0.6 K/UL (ref 0–0.6)
EOSINOPHILS RELATIVE PERCENT: 9.7 %
GAMMA GLOBULIN: 1.8 G/DL (ref 0.6–1.8)
GFR AFRICAN AMERICAN: >60
GFR NON-AFRICAN AMERICAN: 58
GLUCOSE BLD-MCNC: 105 MG/DL (ref 70–99)
GLUCOSE BLD-MCNC: 106 MG/DL (ref 70–99)
GLUCOSE BLD-MCNC: 92 MG/DL (ref 70–99)
GLUCOSE BLD-MCNC: 96 MG/DL (ref 70–99)
GLUCOSE BLD-MCNC: 98 MG/DL (ref 70–99)
HCT VFR BLD CALC: 29.9 % (ref 40.5–52.5)
HEMOGLOBIN: 9.9 G/DL (ref 13.5–17.5)
LYMPHOCYTES ABSOLUTE: 1.4 K/UL (ref 1–5.1)
LYMPHOCYTES RELATIVE PERCENT: 21.7 %
MCH RBC QN AUTO: 30.5 PG (ref 26–34)
MCHC RBC AUTO-ENTMCNC: 33.3 G/DL (ref 31–36)
MCV RBC AUTO: 91.8 FL (ref 80–100)
MONOCYTES ABSOLUTE: 0.5 K/UL (ref 0–1.3)
MONOCYTES RELATIVE PERCENT: 7.6 %
NEUTROPHILS ABSOLUTE: 4 K/UL (ref 1.7–7.7)
NEUTROPHILS RELATIVE PERCENT: 60.4 %
PDW BLD-RTO: 17.3 % (ref 12.4–15.4)
PERFORMED ON: ABNORMAL
PERFORMED ON: ABNORMAL
PERFORMED ON: NORMAL
PERFORMED ON: NORMAL
PLATELET # BLD: 141 K/UL (ref 135–450)
PMV BLD AUTO: 10.4 FL (ref 5–10.5)
POTASSIUM REFLEX MAGNESIUM: 4.3 MMOL/L (ref 3.5–5.1)
RBC # BLD: 3.25 M/UL (ref 4.2–5.9)
SODIUM BLD-SCNC: 139 MMOL/L (ref 136–145)
SPE/IFE INTERPRETATION: NORMAL
TOTAL PROTEIN: 6.6 G/DL (ref 6.4–8.2)
URINE ELECTROPHORESIS INTERP: NORMAL
WBC # BLD: 6.7 K/UL (ref 4–11)

## 2021-06-23 PROCEDURE — 6370000000 HC RX 637 (ALT 250 FOR IP): Performed by: INTERNAL MEDICINE

## 2021-06-23 PROCEDURE — 6370000000 HC RX 637 (ALT 250 FOR IP): Performed by: STUDENT IN AN ORGANIZED HEALTH CARE EDUCATION/TRAINING PROGRAM

## 2021-06-23 PROCEDURE — 99232 SBSQ HOSP IP/OBS MODERATE 35: CPT | Performed by: INTERNAL MEDICINE

## 2021-06-23 PROCEDURE — 36415 COLL VENOUS BLD VENIPUNCTURE: CPT

## 2021-06-23 PROCEDURE — 6360000002 HC RX W HCPCS: Performed by: STUDENT IN AN ORGANIZED HEALTH CARE EDUCATION/TRAINING PROGRAM

## 2021-06-23 PROCEDURE — 93010 ELECTROCARDIOGRAM REPORT: CPT | Performed by: INTERNAL MEDICINE

## 2021-06-23 PROCEDURE — 80048 BASIC METABOLIC PNL TOTAL CA: CPT

## 2021-06-23 PROCEDURE — 2580000003 HC RX 258: Performed by: STUDENT IN AN ORGANIZED HEALTH CARE EDUCATION/TRAINING PROGRAM

## 2021-06-23 PROCEDURE — 1200000000 HC SEMI PRIVATE

## 2021-06-23 PROCEDURE — 85025 COMPLETE CBC W/AUTO DIFF WBC: CPT

## 2021-06-23 RX ADMIN — PANTOPRAZOLE SODIUM 40 MG: 40 TABLET, DELAYED RELEASE ORAL at 06:19

## 2021-06-23 RX ADMIN — CYANOCOBALAMIN TAB 1000 MCG 1000 MCG: 1000 TAB at 08:49

## 2021-06-23 RX ADMIN — ASPIRIN 81 MG: 81 TABLET, CHEWABLE ORAL at 08:49

## 2021-06-23 RX ADMIN — Medication 10 MG: at 23:03

## 2021-06-23 RX ADMIN — Medication 5 ML: at 22:11

## 2021-06-23 RX ADMIN — ATORVASTATIN CALCIUM 80 MG: 80 TABLET, FILM COATED ORAL at 20:55

## 2021-06-23 RX ADMIN — GABAPENTIN 400 MG: 400 CAPSULE ORAL at 20:55

## 2021-06-23 RX ADMIN — HEPARIN SODIUM 5000 UNITS: 5000 INJECTION INTRAVENOUS; SUBCUTANEOUS at 20:55

## 2021-06-23 RX ADMIN — HEPARIN SODIUM 5000 UNITS: 5000 INJECTION INTRAVENOUS; SUBCUTANEOUS at 14:32

## 2021-06-23 RX ADMIN — TAMSULOSIN HYDROCHLORIDE 0.8 MG: 0.4 CAPSULE ORAL at 08:49

## 2021-06-23 RX ADMIN — HEPARIN SODIUM 5000 UNITS: 5000 INJECTION INTRAVENOUS; SUBCUTANEOUS at 06:19

## 2021-06-23 RX ADMIN — ZOLPIDEM TARTRATE 10 MG: 5 TABLET ORAL at 23:03

## 2021-06-23 RX ADMIN — ACETAMINOPHEN 650 MG: 325 TABLET ORAL at 04:32

## 2021-06-23 RX ADMIN — Medication 10 ML: at 08:49

## 2021-06-23 RX ADMIN — GABAPENTIN 400 MG: 400 CAPSULE ORAL at 08:49

## 2021-06-23 ASSESSMENT — PAIN SCALES - GENERAL
PAINLEVEL_OUTOF10: 0

## 2021-06-23 NOTE — PROGRESS NOTES
Internal Medicine  PGY 1  Progress Note    Admit Date: 6/19/2021  Diet: ADULT DIET; Regular; Low Potassium (Less than 3000 mg/day)  Adult Oral Nutrition Supplement; Diabetic Oral Supplement    CC: Dizziness    Interval history:   Overnight, patient had a febrile episode of 101.1 at 0424. Patient was seen this morning in bed. Patient endorses he feels well. Patient endorsed that he did have a fever yesterday but overall he feels well and has no complaints. Upon discussing potential discharge with patient today, patient endorsed that he feels like he needs to be able to stay one more day and be fully optimized prior to leaving tomorrow. Patient denies chills, nausea, vomiting, chest pain, shortness of breath, diarrhea, constipation, dysuria, urinary frequency or urgency.      Medications:     Scheduled Meds:   insulin lispro  0-6 Units Subcutaneous TID WC    insulin lispro  0-3 Units Subcutaneous Nightly    sodium chloride flush  5-40 mL Intravenous 2 times per day    heparin (porcine)  5,000 Units Subcutaneous 3 times per day    aspirin  81 mg Oral Daily    pantoprazole  40 mg Oral QAM AC    tamsulosin  0.8 mg Oral Daily    vitamin B-12  1,000 mcg Oral Daily    gabapentin  400 mg Oral BID    atorvastatin  80 mg Oral Nightly     Continuous Infusions:   dextrose      dextrose      sodium chloride       PRN Meds:glucose, dextrose, glucagon (rDNA), dextrose, zolpidem, glucose, dextrose, glucagon (rDNA), dextrose, sodium chloride flush, sodium chloride, ondansetron **OR** ondansetron, polyethylene glycol, acetaminophen **OR** acetaminophen, melatonin    Objective:   Vitals:   T-max:  Patient Vitals for the past 8 hrs:   BP Temp Temp src Pulse Resp SpO2   06/23/21 1222 (!) 140/63 98 °F (36.7 °C) Oral 58 18 93 %   06/23/21 0844 (!) 124/59 97.5 °F (36.4 °C) Oral 56 18 92 %       Intake/Output Summary (Last 24 hours) at 6/23/2021 1337  Last data filed at 6/23/2021 0311  Gross per 24 hour   Intake 740 ml Output 1475 ml   Net -735 ml       Physical Exam  HENT:      Head: Normocephalic and atraumatic. Mouth/Throat:      Mouth: Mucous membranes are moist.   Eyes:      General: No scleral icterus. Extraocular Movements: Extraocular movements intact. Pupils: Pupils are equal, round, and reactive to light. Cardiovascular:      Rate and Rhythm: Normal rate and regular rhythm. Pulses: Normal pulses. Heart sounds: Murmur heard. No friction rub. No gallop. Comments: Systolic murmur with mild radiation to the carotids appreciated. Pulmonary:      Effort: Pulmonary effort is normal. No respiratory distress. Breath sounds: Normal breath sounds. No wheezing or rales. Abdominal:      General: Bowel sounds are normal. There is no distension. Palpations: Abdomen is soft. Tenderness: There is no abdominal tenderness. There is no guarding. Musculoskeletal:      Right lower leg: Edema present. Left lower leg: Edema present. Comments: 3+ pitting edema to the bilateral knees   Neurological:      Mental Status: He is alert and oriented to person, place, and time. Psychiatric:         Behavior: Behavior normal.         Review of Systems  - Negative except Interval History    LABS:    CBC:   Recent Labs     06/21/21  0631 06/22/21  0635 06/23/21  0617   WBC 6.4 5.9 6.7   HGB 9.6* 10.0* 9.9*   HCT 29.3* 30.0* 29.9*   * 130* 141   MCV 91.8 91.6 91.8     Renal:    Recent Labs     06/21/21  0631 06/22/21  0635 06/23/21  0617    139 139   K 4.0 4.0 4.3    104 105   CO2 26 26 27   BUN 35* 31* 34*   CREATININE 1.5* 1.6* 1.2   GLUCOSE 105* 96 98   CALCIUM 8.0* 8.5 8.4   ANIONGAP 11 9 7     Hepatic:   Recent Labs     06/22/21  0635   PROT 6.6   LABALBU 2.6*     Troponin:   No results for input(s): TROPONINI in the last 72 hours. BNP: No results for input(s): BNP in the last 72 hours. Lipids: No results for input(s): CHOL, HDL in the last 72 hours.     Invalid input(s): LDLCALCU, TRIGLYCERIDE  ABGs:  No results for input(s): PHART, XLM0VFK, PO2ART, EJS3ZIG, BEART, THGBART, A9ZYYHWL, ZQM0UFI in the last 72 hours. INR: No results for input(s): INR in the last 72 hours. Lactate: No results for input(s): LACTATE in the last 72 hours. Cultures:  -----------------------------------------------------------------  RAD:   US ABDOMEN LIMITED   Final Result   IMPRESSION :      Study limitations, as above. Mild hepatomegaly with heterogeneous echogenicity, nonspecific. CT ABDOMEN PELVIS WO CONTRAST Additional Contrast? None   Final Result   1. Unchanged calculi left kidney. No evidence of renal obstruction or staghorn calculi. 2. Unchanged small left exudative pleural effusion. 3. Hiatal hernia. 4. Cholelithiasis. 5. Fecal impaction rectosigmoid colon. XR CHEST PORTABLE   Final Result   1. Left lower lobe atelectasis, airspace disease and effusion redemonstrated. 2. No evidence of congestive failure      CT Head WO Contrast   Final Result   1. Brain atrophy, Otherwise normal          Assessment:   Patient is a 60-year-old male with past medical history of CKD3, chronic indwelling cathether, CAD, HTN who was admitted after self resolved episode of dizziness and found to have MDRO UTI and ALIS. #Acute Cystitis without hematuria with chronic indwelling catheter   - He had a analysis drawn by PCP on 6/5.  Noted to have 159 WBCs, urine culture positive for MDRO Proteus Penadrian   - Is at risk for colonization, no leukocytosis, afebrile   -Infectious disease consulted, appreciate recs: D/c meropenem on 6/22 (will have completed 3 days), No further ID w/u, treatment    #ALIS-Resolved  Likely secondary to suspected ATN  Patient has history of CKD stage III.  His baseline is around 1.2 last around 6/5/2021.    3+ pitting edema on exam.  We will refrain from giving IV fluids at this point pending urine studies.    -FENa =1.8%, intrinsic renal

## 2021-06-23 NOTE — PROGRESS NOTES
ID Follow-up NOTE    CC:   Bacteriuria, Dizziness. Fever  Antibiotics: None     Admit Date: 6/19/2021  Hospital Day: 5    Subjective:     Patient feels good -   No complaint  No dizziness, no dysuria / urinary sx    Objective:     T 101.1 on 6/23 at 424 am (no documented fever before or after this in Epic)    Patient Vitals for the past 8 hrs:   BP Temp Temp src Pulse Resp SpO2   06/23/21 1638 137/64 97.4 °F (36.3 °C) Oral 60 16 92 %   06/23/21 1222 (!) 140/63 98 °F (36.7 °C) Oral 58 18 93 %     I/O last 3 completed shifts: In: 780 [P.O.:780]  Out: 1475 [KJQAM:[id number]]  I/O this shift:  In: -   Out: 1000 [Urine:1000]    EXAM:  GENERAL: No apparent distress.   RA  HEENT: Membranes moist, no oral lesion  NECK:  Supple, no lymphadenopathy  LUNGS: Clear b/l, no rales, no dullness  CARDIAC: RRR, no murmur appreciated  ABD:  + BS, soft / NT -  No suprapubic tenderness, no CVAT  EXT:  No rash, no edema, no lesions  NEURO: No focal neurologic findings  PSYCH: Orientation, sensorium, mood normal  LINES:  Peripheral iv       Data Review:  Lab Results   Component Value Date    WBC 6.7 06/23/2021    HGB 9.9 (L) 06/23/2021    HCT 29.9 (L) 06/23/2021    MCV 91.8 06/23/2021     06/23/2021     Lab Results   Component Value Date    CREATININE 1.2 06/23/2021    BUN 34 (H) 06/23/2021     06/23/2021    K 4.3 06/23/2021     06/23/2021    CO2 27 06/23/2021       Hepatic Function Panel:   Lab Results   Component Value Date    ALKPHOS 65 06/20/2021    ALT 9 06/20/2021    AST 13 06/20/2021    PROT 6.6 06/22/2021    BILITOT 0.4 06/20/2021    BILIDIR <0.2 06/20/2021    IBILI see below 06/20/2021    LABALBU 2.6 06/22/2021       Micro:  6/20 Urine cult - no growth     6/5 Urine cult >100k P penneri  Proteus penneri         Antibiotic Interpretation YANCY   amoxicillin-clavulanate Sensitive <=8/4 mcg/mL   ampicillin Resistant >16 mcg/mL   aztreonam Resistant >16 mcg/mL   ceFAZolin Resistant >16 mcg/mL   cefepime Sensitive <=2 mcg/mL   cefTRIAXone Resistant >32 mcg/mL   cefuroxime Resistant >16 mcg/mL   ciprofloxacin Resistant >2 mcg/mL   ertapenem Sensitive <=0.5 mcg/mL   gentamicin Resistant >8 mcg/mL   meropenem Sensitive <=1 mcg/mL   nitrofurantoin Resistant 64 mcg/mL   piperacillin-tazobactam Sensitive <=16 mcg/mL   tobramycin Resistant >8 mcg/mL   trimethoprim-sulfamethoxazole Resistant >2/38 mcg/mL      Imagin/21 Abd u/s  Study limitations, as above. Mild hepatomegaly with heterogeneous echogenicity, nonspecific.       Abd / Pelvic CT:  1. Unchanged calculi left kidney. No evidence of renal obstruction or staghorn calculi. 2. Unchanged small left exudative pleural effusion. 3. Hiatal hernia. 4. Cholelithiasis. 5. Fecal impaction rectosigmoid colon. Scheduled Meds:   insulin lispro  0-6 Units Subcutaneous TID WC    insulin lispro  0-3 Units Subcutaneous Nightly    sodium chloride flush  5-40 mL Intravenous 2 times per day    heparin (porcine)  5,000 Units Subcutaneous 3 times per day    aspirin  81 mg Oral Daily    pantoprazole  40 mg Oral QAM AC    tamsulosin  0.8 mg Oral Daily    vitamin B-12  1,000 mcg Oral Daily    gabapentin  400 mg Oral BID    atorvastatin  80 mg Oral Nightly       Continuous Infusions:   dextrose      dextrose      sodium chloride         PRN Meds:  glucose, dextrose, glucagon (rDNA), dextrose, zolpidem, glucose, dextrose, glucagon (rDNA), dextrose, sodium chloride flush, sodium chloride, ondansetron **OR** ondansetron, polyethylene glycol, acetaminophen **OR** acetaminophen, melatonin      Assessment:     Hx CAD, HTN, BPH, HL, nephrolithiasis     Presents with dizziness, assoc lightheadedness  Bacteriuria, no UTI (no pyuria, no sx)    FEVER -  at 424am, unknown etiology, poss aspiration    Plan:     Cont off antibiotic (meropenem discontinued , has completed 3 days)  Monitor for further fever      Medical Decision Making:   The following items were considered in medical decision making:  Discussion of patient care with other providers  Reviewed clinical lab tests  Reviewed radiology tests  Reviewed other diagnostic tests/interventions  Independent review of radiologic images  Microbiology cultures and other micro tests reviewed       Discussed with pt  Harvinder Yi MD

## 2021-06-23 NOTE — CARE COORDINATION
SW Following, Pt from home alone, active with [facility] for [address], and [facility] for health aide services. Pt plans to return home and resume these services at DC. Pt will need transportation at DC. SW will continue to follow Pt for any new DC needs, anticipated DC is tomorrow pending medically stable.      Electronically signed by ARIC Truong, CARLIN on 6/23/2021 at 2:54 [phone]

## 2021-06-24 ENCOUNTER — NURSE ONLY (OUTPATIENT)
Dept: CARDIOLOGY | Age: 81
End: 2021-06-24

## 2021-06-24 ENCOUNTER — NURSE ONLY (OUTPATIENT)
Dept: CARDIOLOGY CLINIC | Age: 81
End: 2021-06-24

## 2021-06-24 VITALS
TEMPERATURE: 98.4 F | RESPIRATION RATE: 18 BRPM | HEIGHT: 71 IN | HEART RATE: 65 BPM | DIASTOLIC BLOOD PRESSURE: 58 MMHG | SYSTOLIC BLOOD PRESSURE: 124 MMHG | OXYGEN SATURATION: 94 % | WEIGHT: 207 LBS | BODY MASS INDEX: 28.98 KG/M2

## 2021-06-24 LAB
ANION GAP SERPL CALCULATED.3IONS-SCNC: 11 MMOL/L (ref 3–16)
BASOPHILS ABSOLUTE: 0.1 K/UL (ref 0–0.2)
BASOPHILS RELATIVE PERCENT: 1.2 %
BUN BLDV-MCNC: 43 MG/DL (ref 7–20)
CALCIUM SERPL-MCNC: 8.5 MG/DL (ref 8.3–10.6)
CHLORIDE BLD-SCNC: 105 MMOL/L (ref 99–110)
CO2: 23 MMOL/L (ref 21–32)
CREAT SERPL-MCNC: 1.2 MG/DL (ref 0.8–1.3)
EOSINOPHILS ABSOLUTE: 0.5 K/UL (ref 0–0.6)
EOSINOPHILS RELATIVE PERCENT: 7.6 %
GFR AFRICAN AMERICAN: >60
GFR NON-AFRICAN AMERICAN: 58
GLUCOSE BLD-MCNC: 92 MG/DL (ref 70–99)
GLUCOSE BLD-MCNC: 93 MG/DL (ref 70–99)
GLUCOSE BLD-MCNC: 96 MG/DL (ref 70–99)
HCT VFR BLD CALC: 32.6 % (ref 40.5–52.5)
HEMOGLOBIN: 10.5 G/DL (ref 13.5–17.5)
LYMPHOCYTES ABSOLUTE: 1.9 K/UL (ref 1–5.1)
LYMPHOCYTES RELATIVE PERCENT: 27.6 %
MCH RBC QN AUTO: 29.9 PG (ref 26–34)
MCHC RBC AUTO-ENTMCNC: 32.2 G/DL (ref 31–36)
MCV RBC AUTO: 93.1 FL (ref 80–100)
MONOCYTES ABSOLUTE: 0.5 K/UL (ref 0–1.3)
MONOCYTES RELATIVE PERCENT: 7.1 %
NEUTROPHILS ABSOLUTE: 3.8 K/UL (ref 1.7–7.7)
NEUTROPHILS RELATIVE PERCENT: 56.5 %
PDW BLD-RTO: 17.7 % (ref 12.4–15.4)
PERFORMED ON: NORMAL
PERFORMED ON: NORMAL
PLATELET # BLD: 171 K/UL (ref 135–450)
PMV BLD AUTO: 10 FL (ref 5–10.5)
POTASSIUM REFLEX MAGNESIUM: 4.3 MMOL/L (ref 3.5–5.1)
RBC # BLD: 3.5 M/UL (ref 4.2–5.9)
SODIUM BLD-SCNC: 139 MMOL/L (ref 136–145)
WBC # BLD: 6.7 K/UL (ref 4–11)

## 2021-06-24 PROCEDURE — 85025 COMPLETE CBC W/AUTO DIFF WBC: CPT

## 2021-06-24 PROCEDURE — 6360000002 HC RX W HCPCS: Performed by: STUDENT IN AN ORGANIZED HEALTH CARE EDUCATION/TRAINING PROGRAM

## 2021-06-24 PROCEDURE — 36415 COLL VENOUS BLD VENIPUNCTURE: CPT

## 2021-06-24 PROCEDURE — 2580000003 HC RX 258: Performed by: STUDENT IN AN ORGANIZED HEALTH CARE EDUCATION/TRAINING PROGRAM

## 2021-06-24 PROCEDURE — 93246 EXT ECG>7D<15D RECORDING: CPT | Performed by: INTERNAL MEDICINE

## 2021-06-24 PROCEDURE — 80048 BASIC METABOLIC PNL TOTAL CA: CPT

## 2021-06-24 PROCEDURE — 99232 SBSQ HOSP IP/OBS MODERATE 35: CPT | Performed by: INTERNAL MEDICINE

## 2021-06-24 PROCEDURE — 6370000000 HC RX 637 (ALT 250 FOR IP): Performed by: STUDENT IN AN ORGANIZED HEALTH CARE EDUCATION/TRAINING PROGRAM

## 2021-06-24 RX ADMIN — Medication 10 ML: at 09:20

## 2021-06-24 RX ADMIN — TAMSULOSIN HYDROCHLORIDE 0.8 MG: 0.4 CAPSULE ORAL at 09:20

## 2021-06-24 RX ADMIN — HEPARIN SODIUM 5000 UNITS: 5000 INJECTION INTRAVENOUS; SUBCUTANEOUS at 06:45

## 2021-06-24 RX ADMIN — ASPIRIN 81 MG: 81 TABLET, CHEWABLE ORAL at 09:20

## 2021-06-24 RX ADMIN — GABAPENTIN 400 MG: 400 CAPSULE ORAL at 09:20

## 2021-06-24 RX ADMIN — PANTOPRAZOLE SODIUM 40 MG: 40 TABLET, DELAYED RELEASE ORAL at 06:45

## 2021-06-24 RX ADMIN — HEPARIN SODIUM 5000 UNITS: 5000 INJECTION INTRAVENOUS; SUBCUTANEOUS at 14:28

## 2021-06-24 RX ADMIN — CYANOCOBALAMIN TAB 1000 MCG 1000 MCG: 1000 TAB at 09:20

## 2021-06-24 ASSESSMENT — PAIN SCALES - GENERAL
PAINLEVEL_OUTOF10: 0
PAINLEVEL_OUTOF10: 0

## 2021-06-24 NOTE — CARE COORDINATION
Case Management Assessment            Discharge Note                    Date / Time of Note: 6/24/2021 10:50 AM                  Discharge Note Completed by: Jose Harrison RN    Patient Name: Nando Sims   YOB: 1940  Diagnosis: ALIS (acute kidney injury) [facility]) [N17.9]   Date / Time: 6/19/2021  5:19 PM    Current PCP: Tim Matt patient: No    Hospitalization in the last 30 days: No    Advance Directives:  Code Status: Full Code  [place] DNR form completed and on chart: No    Financial:  Payor: Joselyn Torres / Plan: [facility] HMO / Product Type: *No Product type* /      Pharmacy:    [address] [place] [address] [facility] [phone] - F [phone]  [address] [place] [address]  Phone: [phone] Fax: [phone]    [id number] [address] [phone] - F [phone]  [place] [phone]  Phone: [phone] Fax: [phone]      Assistance purchasing medications?: Potential Assistance Purchasing Medications: No  Assistance provided by Case Management: None at this time    Does patient want to participate in local refill/ meds to beds program?: No    Meds To Beds General Rules:  1. Can ONLY be done Monday- Friday between 8:30am-5pm  2. Prescription(s) must be in pharmacy by 3pm to be filled same day  3. Copy of patient's insurance/ prescription drug card and patient face sheet must be sent along with the prescription(s)  4. Cost of Rx cannot be added to hospital bill. If financial assistance is needed, please contact unit  or ;  or  CANNOT provide pharmacy voucher for patients co-pays  5.  Patients can then  the prescription on their way out of the hospital at discharge, or pharmacy can deliver to the bedside if staff is available. (payment due at time of pick-up or delivery - cash, check, or card accepted)     Able to afford home medications/ co-pay costs: Yes    ADLS:  Current PT AM-PAC Score: 14 /24  Current OT AM-PAC Score: 19 /24      DISCHARGE Disposition: Home with Home Health Care: SN PT OT      LOC at discharge: Not Applicable  AZRA Completed: No    Notification completed in HENS/PAS?:  Not Applicable    IMM Completed:   Yes, Case management has presented and reviewed IMM letter #2 to the patient and/or family/ POA. Patient and/or family/POA verbalized understanding of their medicare rights and appeal process if needed. Patient and/or family/POA has signed, initialed and placed today's date (06/234/2021) and time (1100) on IMM letter #2 on the the appropriate lines. Patient and/or family/POA, copy of letter offered and they are aware that this original copy of IMM letter #2 is available prior to discharge from the paper chart on the unit. Electronic documentation has been entered into epic for IMM letter #2 and original paper copy has been added to the paper chart at the nurses station. Transportation:  Transportation PLAN for discharge: EMS transportation   Mode of Transport: Ambulance stretcher - BLS  Reason for medical transport:  Morbid Obesity causing patient to unsafely ambulate without assistance and requires ambulance transport due to dizziness and unsteady ; sciatica   Name of Transport Company: [facility]  Phone: [address]  [address], [place], [address]   Phone: [phone]  ( are booked for today and need  24 hr advance  notice for reservation )     Time of Transport: 1430    Transport form completed: Yes    Home Care:  [address] ordered at discharge: Yes  [address]: [employer]  Phone: [phone]  Fax: [phone]  Orders faxed: Yes, Andreia in Intake aware  And can  Pull from [address]:  DME Provider: KEV  Equipment obtained during hospitalization: NA  , has  Needed DME     Home Oxygen and Respiratory Equipment:  Oxygen needed at discharge?: No  [address]: Not Applicable  Portable tank available for discharge?: Not Indicated    Dialysis:  Dialysis patient: No    Dialysis Center:  Not Applicable    Hospice Services:  Location: Not Applicable  Agency: Not Applicable    Consents signed: No    Referrals made at [facility] for outpatient continued care:  Not Applicable    Additional CM Notes:     CM confirmed  D/c home w/  KIRSTEN with [facility]efer      Patient was discharged with the placement of a 2-week event monitor as well as follow-up with electrophysiology upon discharge. Patient is active with [facility], Cheyenne Duarte is his Case Manger ([phone]) and will  restart care ; pt requested  Assist with transportation     New Rx:    N/A   Will follow up out patient as instructed. The Plan for Transition of Care is related to the following treatment goals of ALIS (acute kidney injury) ([facility] [address] [N17.9]    The Patient and/or patient representative Rachele Lynch and his family were provided with a choice of provider and agrees with the discharge plan Yes    Freedom of choice list was provided with basic dialogue that supports the patient's individualized plan of care/goals and shares the quality data associated with the providers.  Yes    Care Transitions patient: No    Thony Gray RN  [facility]  Case Management Department  Ph: [phone]

## 2021-06-24 NOTE — PROGRESS NOTES
<=2 mcg/mL   cefTRIAXone Resistant >32 mcg/mL   cefuroxime Resistant >16 mcg/mL   ciprofloxacin Resistant >2 mcg/mL   ertapenem Sensitive <=0.5 mcg/mL   gentamicin Resistant >8 mcg/mL   meropenem Sensitive <=1 mcg/mL   nitrofurantoin Resistant 64 mcg/mL   piperacillin-tazobactam Sensitive <=16 mcg/mL   tobramycin Resistant >8 mcg/mL   trimethoprim-sulfamethoxazole Resistant >2/38 mcg/mL      Imagin/21 Abd u/s  Study limitations, as above. Mild hepatomegaly with heterogeneous echogenicity, nonspecific.       Abd / Pelvic CT:  1. Unchanged calculi left kidney. No evidence of renal obstruction or staghorn calculi. 2. Unchanged small left exudative pleural effusion. 3. Hiatal hernia. 4. Cholelithiasis. 5. Fecal impaction rectosigmoid colon. Scheduled Meds:   insulin lispro  0-6 Units Subcutaneous TID WC    insulin lispro  0-3 Units Subcutaneous Nightly    sodium chloride flush  5-40 mL Intravenous 2 times per day    heparin (porcine)  5,000 Units Subcutaneous 3 times per day    aspirin  81 mg Oral Daily    pantoprazole  40 mg Oral QAM AC    tamsulosin  0.8 mg Oral Daily    vitamin B-12  1,000 mcg Oral Daily    gabapentin  400 mg Oral BID    atorvastatin  80 mg Oral Nightly       Continuous Infusions:   dextrose      dextrose      sodium chloride         PRN Meds:  glucose, dextrose, glucagon (rDNA), dextrose, zolpidem, glucose, dextrose, glucagon (rDNA), dextrose, sodium chloride flush, sodium chloride, ondansetron **OR** ondansetron, polyethylene glycol, acetaminophen **OR** acetaminophen, melatonin      Assessment:     Hx CAD, HTN, BPH, HL, nephrolithiasis     Presents with dizziness, assoc lightheadedness  Bacteriuria, no UTI (no pyuria, no sx)    FEVER -  at 424am, unknown etiology, poss aspiration    Plan:     Cont off antibiotic (meropenem discontinued , has completed 3 days)  Monitor for further fever      Medical Decision Making:   The following items were considered in medical decision making:  Discussion of patient care with other providers  Reviewed clinical lab tests  Reviewed radiology tests  Reviewed other diagnostic tests/interventions  Independent review of radiologic images  Microbiology cultures and other micro tests reviewed       Discussed with pt  Mercy Mathew MD

## 2021-06-24 NOTE — PROGRESS NOTES
Pt discharged home to private residence with [place] [id number]. IV removed with no complications. Tele removed. Pt bathed prior to discharge and in clean gown. Left floor via stretcher with all personal belongings. Discharge instructions gone over with patient new medications and side effects explained. Verbalized understanding with no further questions. Aware of upcoming follow up appointments.

## 2021-06-24 NOTE — PROGRESS NOTES
Physician Progress Note      Kaya Lan  [facility] #:                  [id number]  :                       1940  ADMIT DATE:       2021 5:19 PM  [address] [place] DATE:        2021 2:43 PM  RESPONDING  PROVIDER #:        Neelima Alarcon MD          QUERY TEXT:    Patient admitted with uti. Noted documentation of NSTEMI type II in consult   note . In order to support the diagnosis of NSTEMI type II, please refer   to 4th universal definition of MI below and include additional clinical   indicators in your documentation. Or please document if the diagnosis of   NSTEMI type II has been ruled out after study. [NSTEMI type II ruled out after study and demand ischemia due to ALIS   confirmed::NSTEMI Type II was ruled out after study and demand ischemia due to   ALIS confirmed.]]    The medical record reflects the following:  Risk Factors: 80 yo w/ ALIS, UTI  Clinical Indicators: per EP cards : NSTEMI, type II, possibly secondary to   acute kidney injury. Per IM : #Troponinemia  -No chest pain or shortness of breath. EKG unchanged from baseline. He has a   first-degree heart block which is chronic. Troponin significantly lower than   his previous admissions. troponin 1.8 - 1.6 - 1.5 - 1.6  Treatment: Trend Troponins, EP cards consult    Fourth Universal Definition of Myocardial Infarction:   To have a myocardial   infarction requires both an elevated troponin blood test along with at least   one of the following:  - Symptoms of acute myocardial ischemia (Types 1 - 5 MI)  - Clinical evidence of ischemia, as evidenced in an electrocardiogram (EKG)   showing new ischemic changes (Type 1, Type 2, Type 3, or Type 4a MI)  - Development of pathological Q waves (Types 1 - 5 MI)  - Imaging evidence of new loss of viable myocardium or new regional wall   motion abnormality in a pattern consistent with an ischemic etiology (Types 1   - 5 MI)  Options provided:  -- NSTEMI type II due to ALIS present as evidenced by, Please document   evidence. -- Other - I will add my own diagnosis  -- Disagree - Not applicable / Not valid  -- Disagree - Clinically unable to determine / Unknown  -- Refer to Clinical Documentation Reviewer    PROVIDER RESPONSE TEXT:    Provider is clinically unable to determine a response to this query.     Query created by: Cale Gee on 6/24/2021 6:29 AM      Electronically signed by:  Mariel Gale MD 6/24/2021 4:34 PM

## 2021-06-24 NOTE — PROGRESS NOTES
Vital signs stable, afebrile. Pt resting comfortably. Voices no complaints. Will continue to monitor.

## 2021-06-24 NOTE — DISCHARGE SUMMARY
discharge. On the date of discharge, the patient reported feeling stable. The patient was found to not be in any acute distress, with vital signs within normal limits, and no new abnormalities on physical examination. Further, the patient expressed appropriate understanding of, and agreement with, the discharge recommendations, medications, and plan. Disposition: Home with home health    Physical Exam Performed:   BP (!) 115/51   Pulse 80   Temp 98.3 °F (36.8 °C) (Oral)   Resp 16   Ht 5' 11\" (1.803 m)   Wt 207 lb (93.9 kg)   SpO2 93%   BMI 28.87 kg/m²     Physical Exam  HENT:      Head: Normocephalic and atraumatic. Mouth/Throat:      Mouth: Mucous membranes are moist.   Eyes:      General: No scleral icterus. Extraocular Movements: Extraocular movements intact. Pupils: Pupils are equal, round, and reactive to light. Cardiovascular:      Rate and Rhythm: Normal rate and regular rhythm. Pulses: Normal pulses. Heart sounds: Murmur heard. No friction rub. No gallop. Comments: Systolic murmur with mild radiation to the carotids appreciated. Pulmonary:      Effort: Pulmonary effort is normal. No respiratory distress. Breath sounds: Normal breath sounds. No wheezing or rales. Abdominal:      General: Bowel sounds are normal. There is no distension. Palpations: Abdomen is soft. Tenderness: There is no abdominal tenderness. There is no guarding. Musculoskeletal:      Right lower leg: Edema present. Left lower leg: Edema present. Comments: 3+ pitting edema to the bilateral knees   Neurological:      Mental Status: He is alert and oriented to person, place, and time. Psychiatric:         Behavior: Behavior normal.   Labs:  For convenience and continuity at follow-up the following most recent labs are provided:    CBC:    Lab Results   Component Value Date    WBC 6.7 06/24/2021    HGB 10.5 06/24/2021    HCT 32.6 06/24/2021     06/24/2021 Renal:    Lab Results   Component Value Date     06/23/2021    K 4.3 06/23/2021     06/23/2021    CO2 27 06/23/2021    BUN 34 06/23/2021    CREATININE 1.2 06/23/2021    CALCIUM 8.4 06/23/2021    PHOS 3.0 04/24/2021         Significant Diagnostic Studies    Radiology:   US ABDOMEN LIMITED   Final Result   IMPRESSION :      Study limitations, as above. Mild hepatomegaly with heterogeneous echogenicity, nonspecific. CT ABDOMEN PELVIS WO CONTRAST Additional Contrast? None   Final Result   1. Unchanged calculi left kidney. No evidence of renal obstruction or staghorn calculi. 2. Unchanged small left exudative pleural effusion. 3. Hiatal hernia. 4. Cholelithiasis. 5. Fecal impaction rectosigmoid colon. XR CHEST PORTABLE   Final Result   1. Left lower lobe atelectasis, airspace disease and effusion redemonstrated. 2. No evidence of congestive failure      CT Head WO Contrast   Final Result   1. Brain atrophy, Otherwise normal           Consults:   IP CONSULT TO HOSPITALIST  IP CONSULT TO SOCIAL WORK  IP CONSULT TO PHARMACY  IP CONSULT TO CARDIOLOGY  IP CONSULT TO INFECTIOUS DISEASES    Discharge Medications:   Current Discharge Medication List           Details   Balsam [place]-Castor Oil (VENELEX) OINT ointment Apply topically daily as needed      docusate sodium (COLACE) 100 MG capsule Take 100 mg by mouth daily as needed for Constipation      gabapentin (NEURONTIN) 600 MG tablet Take 600 mg by mouth 2 times daily.       furosemide (LASIX) 40 MG tablet TAKE 1 TABLET TWICE DAILY  Qty: 180 tablet, Refills: 0      amLODIPine (NORVASC) 5 MG tablet Take 1 tablet by mouth daily  Qty: 30 tablet, Refills: 3      dicyclomine (BENTYL) 20 MG tablet Take 20 mg by mouth 3 times daily as needed      vitamin B-12 (CYANOCOBALAMIN) 1000 MCG tablet Take 1,000 mcg by mouth daily      aspirin 81 MG chewable tablet Take 1 tablet by mouth daily  Qty: 30 tablet, Refills: 3      pantoprazole (PROTONIX) 40 MG tablet Take 1 tablet by mouth every morning (before breakfast)  Qty: 30 tablet, Refills: 3      tamsulosin (FLOMAX) 0.4 MG capsule Take 0.8 mg by mouth daily       Melatonin 10 MG TABS Take 10 mg by mouth nightly as needed       atorvastatin (LIPITOR) 80 MG tablet Take 80 mg by mouth nightly. zolpidem (AMBIEN) 10 MG tablet Take 10 mg by mouth nightly as needed for Sleep. Time Spent on discharge is more than 30 minutes in the examination, evaluation, counseling and review of medications and discharge plan.     Signed:    Daya Pedraza MD   Internal Medicine Resident, PGY-1  6/24/2021

## 2021-06-24 NOTE — CARE COORDINATION
CTN contacted Andreia with [employer] [phone]. They have accepted this patient and will pull referral from [facility]. They will contact patient and make arrangements for [facility].    Electronically signed by Darrian Stratton LPN on 0/14/8655 at 26:97 AM

## 2021-07-01 NOTE — PROGRESS NOTES
Physician Progress Note      PATIENTColkodi Tripp  CSN #:                  [id number]  :                       1940  ADMIT DATE:       2021 5:19 PM  Radha Schilling DATE:        2021 2:43 PM  RESPONDING  PROVIDER #:        Kristin Zaidi MD          QUERY TEXT:    Patient admitted with cystitis. Noted documentation of troponemia in H&P and   NSTEMI, type II in EP cards consult . If possible, please document in progress notes and discharge summary if you   are evaluating and /or treating any of the following: The medical record reflects the following:  Risk Factors: 80 yo w ALIS, elevated troponins  Clinical Indicators: Per H&P: Troponinemia -No chest pain or shortness of   breath. EKG unchanged from baseline. Troponin significantly lower than his   previous admissions in the setting of ALIS. Per EP Cards :  NSTEMI, type   II, possibly secondary to acute kidney injury. Treatment: Trend troponins,  EP cards consult, EKG. Options provided:  -- Troponemia confirmed and NSTEMI type II ruled out  -- NSTEMI type II confirmed and troponemia ruled out  -- Other - I will add my own diagnosis  -- Disagree - Not applicable / Not valid  -- Disagree - Clinically unable to determine / Unknown  -- Refer to Clinical Documentation Reviewer    PROVIDER RESPONSE TEXT:    After study, troponemia confirmed and NSTEMI type II ruled out.     Query created by: Palak Phillip on 2021 7:12 AM      Electronically signed by:  Kristin Zaidi MD 2021 8:35 AM

## 2021-07-22 PROCEDURE — 93248 EXT ECG>7D<15D REV&INTERPJ: CPT | Performed by: INTERNAL MEDICINE

## 2021-07-23 DIAGNOSIS — I49.5 SSS (SICK SINUS SYNDROME) (HCC): ICD-10-CM

## 2021-08-23 NOTE — PROGRESS NOTES
[address]   Electrophysiology  Office Visit  Date: 8/30/2021    Chief Complaint   Patient presents with    Hypertension    Bradycardia    Dizziness       Cardiac HX: Margie Le is a [de-identified] y.o. man with a h/o HTN, HLD, GERD, carotid stenosis, CAD,  follows with Dr. Andres Romo, s/p CABG, s/p MPI (2020) that showed a 5 sec pause, patient also became unresponsive during MPI, LHC (2020) showed 4/5 patent grafts and flow limiting DEANNA to LAD with no opportunity for intervention, 3-day Cam (8/208/24/2021) showed average HR 59 (341 27), NSR/SB, 1 AT lasting 3 beats, hospitalized (6/2021) for dizziness/lightheadedness, SB on telemetry, 2-week Cam (6/247/9//21) showed average HR 60 (33149), NSR, first-degree AV block, 6 episodes AT longest 11 beats, 1 pause lasting 2.5 seconds    Interval History/HPI: Patient is here for follow-up for bradycardia, AT. He was recently hospitalized in June 2021 for complaints of dizziness, lightheadedness. He wore 2-week monitor (6/247/9//21) showed average HR 60 (33149), NSR, first-degree AV block, 6 episodes AT longest 11 beats, 1 pause lasting 2.5 seconds at 5:30 AM.  States he has not experienced any dizziness or lightheadedness recently, has occasional dizziness when he changes positions too quickly. Today he is in NSR, rate controlled . Pt denies palpitations, heart racing. No CP, SOB, PND, orthopnea, BLE edema. BP well controlled. States he had some fatigue last week with the high temperatures and humidity, did not want to turn his air conditioning on, states he was hydrating well during this time. Home medications:   Current Outpatient Medications on File Prior to Visit   Medication Sig Dispense Refill    Balsam [place]-Castor Oil (VENELEX) OINT ointment Apply topically daily as needed      docusate sodium (COLACE) 100 MG capsule Take 100 mg by mouth daily as needed for Constipation      gabapentin (NEURONTIN) 600 MG tablet Take 600 mg by mouth 2 times daily.  furosemide (LASIX) 40 MG tablet TAKE 1 TABLET TWICE DAILY 180 tablet 0    amLODIPine (NORVASC) 5 MG tablet Take 1 tablet by mouth daily 30 tablet 3    dicyclomine (BENTYL) 20 MG tablet Take 20 mg by mouth 3 times daily as needed      vitamin B-12 (CYANOCOBALAMIN) 1000 MCG tablet Take 1,000 mcg by mouth daily      aspirin 81 MG chewable tablet Take 1 tablet by mouth daily 30 tablet 3    pantoprazole (PROTONIX) 40 MG tablet Take 1 tablet by mouth every morning (before breakfast) 30 tablet 3    tamsulosin (FLOMAX) 0.4 MG capsule Take 0.8 mg by mouth daily       Melatonin 10 MG TABS Take 10 mg by mouth nightly as needed       atorvastatin (LIPITOR) 80 MG tablet Take 80 mg by mouth nightly.  zolpidem (AMBIEN) 10 MG tablet Take 10 mg by mouth nightly as needed for Sleep. No current facility-administered medications on file prior to visit. Past Medical History:   Diagnosis Date    BPH (benign prostatic hyperplasia)     Carotid stenosis 12/2013    JESU 55-87% stenosis; LICA 16-07% stenosis    Cellulitis 12/2013    LLE    Chronic back pain     Diverticular disease     Erectile dysfunction     GERD (gastroesophageal reflux disease)     History of atrial fibrillation     Hypercholesteremia     Hypertension     Lower GI bleed     MDRO (multiple drug resistant organisms) resistance 10/26/2019    urine    Neuromuscular disorder (HCC)     spasticity    Renal insufficiency     Risk for falls     uses walker    Tinea corporis 12/2013    LLE    Vitamin B12 deficiency         Past Surgical History:   Procedure Laterality Date    BACK SURGERY  2006    lower lumbar    CORONARY ARTERY BYPASS GRAFT  12/13/2013    CABG x 5 (Dr Lisseth Ham), svg to diag, om1 and 3, distal rca, kelly to lad.      CYSTOSCOPY N/A 1/10/2019    CYSTOSCOPY performed by Lali Cruz MD at [place] [address] Right 5/1/2015    [facility]    SIGMOIDOSCOPY N/A 6/11/2020    SIGMOIDOSCOPY DIAGNOSTIC FLEXIBLE performed by Derek Duran MD at [facility] N/A 5/4/2020    EGD BIOPSY performed by Derek Duran MD at [facility] ENDOSCOPY       Family History:  Reviewed. family history includes Heart Disease in his father. Review of System:    · Constitutional: No fevers, chills. · Eyes: No visual changes or diplopia. No scleral icterus. · ENT: No Headaches. No mouth sores or sore throat. · Cardiovascular: No for chest pain, No for dyspnea on exertion, No for palpitations or No for loss of consciousness. No cough, hemoptysis, No for pleuritic pain, or phlebitis. · Respiratory: No for cough or wheezing. No hematemesis. · Gastrointestinal: No abdominal pain, blood in stools. · Genitourinary: No dysuria, or hematuria. · Musculoskeletal: Yes gait disturbance,    · Integumentary: No rash or pruritis. · Neurological: No headache, change in muscle strength, numbness or tingling. · Psychiatric: No anxiety, or depression. · Endocrine: No temperature intolerance. No excessive thirst, fluid intake, or urination. · Hem/Lymph: No abnormal bruising or bleeding, blood clots or swollen lymph nodes. · Allergic/Immunologic: No nasal congestion or hives. Physical Examination:  Vitals:    08/30/21 1256   BP: 124/60   Pulse: 71         Wt Readings from Last 3 Encounters:   08/30/21 187 lb 9.6 oz (85.1 kg)   06/22/21 207 lb (93.9 kg)   05/31/21 213 lb (96.6 kg)       · Constitutional: Oriented. No distress. · Head: Normocephalic and atraumatic. · Mouth/Throat: Oropharynx is clear and moist.   · Eyes: Conjunctivae clear without jaunduice. PERRL. · Neck: Neck supple. No rigidity. No JVD present. · Cardiovascular: Normal rate, regular rhythm, S1&S2. · Pulmonary/Chest: Bilateral respiratory sounds. No wheezes, No rhonchi. · Abdominal: Soft. Bowel sounds present. No distension, No tenderness. · Musculoskeletal: No tenderness.  No edema · Lymphadenopathy: Has no cervical adenopathy. · Neurological: Alert and oriented. Cranial nerve appears intact, No Gross deficit   · Skin: Skin is warm and dry. No rash noted. · Psychiatric: Has a normal mood, affect and behavior     Labs:  Reviewed. No results for input(s): NA, K, CL, CO2, PHOS, BUN, CREATININE in the last 72 hours. Invalid input(s): CA,  TSH  No results for input(s): WBC, HGB, HCT, MCV, PLT in the last 72 hours. Lab Results   Component Value Date    CKTOTAL 93 06/19/2021    TROPONINI 0.05 06/20/2021     No results found for: BNP  Lab Results   Component Value Date    PROTIME 14.2 04/22/2021    PROTIME 14.1 02/14/2021    PROTIME 12.0 12/17/2019    INR 1.22 04/22/2021    INR 1.21 02/14/2021    INR 1.03 12/17/2019     Lab Results   Component Value Date    CHOL 102 06/05/2021    HDL 43 06/05/2021    TRIG 54 06/05/2021       ECG: Personally reviewed: NSR, HR 71, , QRS 98, QTc 437    ECHO:  1.14.2020   Summary   Normal left ventricle size. There is mild concentric left ventricular   hypertrophy. Overall left ventricular systolic function appears normal with   an ejection fraction of 55-60%. No regional wall motion abnormalities are   noted. Diastolic filling parameters suggests normal diastolic function. Trace mitral and tricuspid regurgitation is present. Estimated pulmonary artery systolic pressure is 30 mmHg assuming a right   atrial pressure of 3 mmHg. Biatrial enlargement. Stress Test: 8.18.2020  Summary    Very small and mild distal anteroseptal reversible defect (apex is    preserved), cannot exclude ischemia vs artifact.    Normal LV size and systolic function.    Left ventricular ejection fraction of 68 %.    There is severe hypokinesis / akinesis of the septal wall.    Overall findings represent a intermediate risk scan. Dr Idania Gamino and Kasi Faria NP have been notified.      Cardiac Angiography: 8.19.2020 [facility])  Angiographic Findings:  Left Main: Normal  Left Anterior Descending: Has mid vessel plaque and areas of stenosis up to 60%. Circumflex: Has subtotal occlusion in the midportion. There is a vein graft to the distal circumflex vessels. Right Coronary: Is probably nondominant. Mid distal stenosis of 60 to 70%. Left Ventriculogram: Normal contractility and size with ejection fraction of 55 to 60%. EDP was 12  Right ileofemoral: Normal vessel. Our insertion site was at the superficial femoral therefore no closure device was added. Saphenous vein graft on the right was injected. It shows what appears to be quadruple skip graft. Is a large vessel with good flow to all the areas that it was attached to. We see distal right and we see distal circumflex and obtuse marginal x2  The DEANNA is a small atretic looking vessel. The anastomosis at the LAD with very limited and slow flow down the LAD. We do not see any opportunities for intervention. Hemodynamics:   Left Ventricular Pressure: 12  Central Aortic Pressure: 100  Assessment:  Patient with 5 vessel bypass in 4 of the grafts are in really great shape. The DEANNA is atretic into the LAD. We do not see any opportunities for intervention on this patient. The LV function is good with normal ejection fraction of 55 to 60%. Recommendations: We will continue his current therapy including anti-ischemic therapy. Optimize lipid management with LDL target of 60 or less. Blood pressure looks really good on current therapy. He is being evaluated by EP because of the bradycardia and pauses occurring in the stress lab which may have been related to the [facility]. I do not suspect that a pacemaker is necessary at this time as he has had good rhythm throughout the night. Probably outpatient monitoring would be appropriate for this patient. We will await results and recommendation from electrophysiology.     Problem List:   Patient Active Problem List    Diagnosis Date Noted    Altered mental status     Hyperkalemia     Encephalopathy acute 04/21/2021    Acute renal failure superimposed on stage 3 chronic kidney disease ([address] 02/15/2021    Urinary tract infection associated with indwelling urethral catheter ([address] 02/15/2021    Acute cystitis with hematuria 02/14/2021    Abnormal angiogram 08/27/2020    H/O angiography 08/20/2020    Abnormal stress test 08/18/2020    Constipation 06/09/2020    Encopresis with constipation and overflow incontinence 06/09/2020    Cellulitis of scrotum 05/02/2020    Acute renal injury ([address] 03/17/2020    Pseudomonas infection 02/06/2020    Dyspnea     Bradycardia     CHF with unknown LVEF ([address] 01/13/2020    Gross hematuria 12/17/2019    Chronic indwelling Iglesias catheter 11/20/2019    Hyperlipidemia 11/20/2019    Bilateral lower leg cellulitis 11/20/2019    Neurogenic bladder 11/20/2019    Bacteriuria 11/20/2019    Abnormality of urethral meatus 11/20/2019    Acute encephalopathy 10/08/2019    Problem with urinary catheter ([address] 10/07/2019    Edema 23/48/3025    Complicated UTI (urinary tract infection) 01/07/2019    Hypothermia 01/03/2019    Acute low back pain without sciatica 05/26/2017    Hip fracture, right ([address] 05/01/2015    Acute right hip pain     Lower GI bleeding 11/10/2014    CAD (coronary artery disease) 01/27/2014    S/P CABG x 5 01/27/2014    Cellulitis 12/16/2013    Essential hypertension 12/16/2013    GERD (gastroesophageal reflux disease) 12/16/2013    Acute blood loss anemia 12/16/2013    Tinea corporis 12/16/2013    Carotid stenosis 12/16/2013    Hypotension 11/30/2013    Light headed 11/30/2013        Assessment:   1. Bradycardia    2. Dizziness    3.  Essential hypertension           Cardiac HX: Boom Frost is a [de-identified] y.o. man with a h/o HTN, HLD, GERD, carotid stenosis, CAD,  follows with Dr. Jamison Gould, s/p CABG, s/p MPI (2020) that showed a 5 sec pause, patient also became unresponsive during MPI, LHC (2020) showed 4/5 patent grafts and flow limiting DEANNA to LAD with no opportunity for intervention, 3-day Cam (8/208/24/2021) showed average HR 59 (341 27), NSR/SB, 1 AT lasting 3 beats, hospitalized (6/2021) for dizziness/lightheadedness, SB on telemetry, 2-week Cam (6/247/9/21) showed average HR 60 (33149), NSR, first-degree AV block, 6 episodes AT longest 11 beats, 1 pause lasting 2.5 seconds at 5:30 am    Bradycardia/dizziness  - In NSR today  - No further complaints of dizziness  - 2-week Cam (6/247/9//21) showed average HR 60 (331 49), NSR, first-degree AV block, 6 episodes AT longest 11 beats, 1 pause lasting 2.5 seconds at 5:30 AM, reviewed results with patient  - F/u 6 months   - ECG ordered and results personally reviewed     HTN  - BP well controlled, 124/60  - On amlodipine    EF of 25-59%  No known systolic HF  ASA and Statin for CAD  No known AF     No Tobacco use     All questions and concerns were addressed to the patient/family. Alternatives to my treatment were discussed. The note was completed using EMR. Every effort was made to ensure accuracy; however, inadvertent computerized transcription errors may be present. Patient received education regarding their diagnosis, treatment and medications while in the office today.       Flo Sanon, [address]

## 2021-08-30 ENCOUNTER — OFFICE VISIT (OUTPATIENT)
Dept: CARDIOLOGY CLINIC | Age: 81
End: 2021-08-30
Payer: MEDICARE

## 2021-08-30 VITALS
SYSTOLIC BLOOD PRESSURE: 124 MMHG | BODY MASS INDEX: 26.26 KG/M2 | HEART RATE: 71 BPM | WEIGHT: 187.6 LBS | HEIGHT: 71 IN | DIASTOLIC BLOOD PRESSURE: 60 MMHG

## 2021-08-30 DIAGNOSIS — R00.1 BRADYCARDIA: Primary | ICD-10-CM

## 2021-08-30 DIAGNOSIS — R42 DIZZINESS: ICD-10-CM

## 2021-08-30 DIAGNOSIS — I10 ESSENTIAL HYPERTENSION: ICD-10-CM

## 2021-08-30 PROCEDURE — 1123F ACP DISCUSS/DSCN MKR DOCD: CPT | Performed by: NURSE PRACTITIONER

## 2021-08-30 PROCEDURE — 99214 OFFICE O/P EST MOD 30 MIN: CPT | Performed by: NURSE PRACTITIONER

## 2021-08-30 PROCEDURE — G8417 CALC BMI ABV UP PARAM F/U: HCPCS | Performed by: NURSE PRACTITIONER

## 2021-08-30 PROCEDURE — G8427 DOCREV CUR MEDS BY ELIG CLIN: HCPCS | Performed by: NURSE PRACTITIONER

## 2021-08-30 PROCEDURE — 1036F TOBACCO NON-USER: CPT | Performed by: NURSE PRACTITIONER

## 2021-08-30 PROCEDURE — 93000 ELECTROCARDIOGRAM COMPLETE: CPT | Performed by: NURSE PRACTITIONER

## 2021-08-30 PROCEDURE — 4040F PNEUMOC VAC/ADMIN/RCVD: CPT | Performed by: NURSE PRACTITIONER

## 2021-09-23 RX ORDER — FUROSEMIDE 40 MG/1
TABLET ORAL
Qty: 180 TABLET | Refills: 3 | Status: ON HOLD | OUTPATIENT
Start: 2021-09-23 | End: 2022-02-02 | Stop reason: HOSPADM

## 2021-09-26 ENCOUNTER — APPOINTMENT (OUTPATIENT)
Dept: CT IMAGING | Age: 81
End: 2021-09-26
Payer: MEDICARE

## 2021-09-26 ENCOUNTER — HOSPITAL ENCOUNTER (EMERGENCY)
Age: 81
Discharge: HOME | End: 2021-09-26
Attending: EMERGENCY MEDICINE
Payer: MEDICARE

## 2021-09-26 VITALS
WEIGHT: 187 LBS | RESPIRATION RATE: 16 BRPM | BODY MASS INDEX: 26.18 KG/M2 | TEMPERATURE: 98.4 F | OXYGEN SATURATION: 96 % | HEART RATE: 78 BPM | HEIGHT: 71 IN | SYSTOLIC BLOOD PRESSURE: 117 MMHG | DIASTOLIC BLOOD PRESSURE: 63 MMHG

## 2021-09-26 DIAGNOSIS — K59.00 CONSTIPATION, UNSPECIFIED CONSTIPATION TYPE: ICD-10-CM

## 2021-09-26 DIAGNOSIS — K62.5 RECTAL BLEEDING: Primary | ICD-10-CM

## 2021-09-26 LAB
ANION GAP SERPL CALCULATED.3IONS-SCNC: 11 MMOL/L (ref 3–16)
BASOPHILS ABSOLUTE: 0.1 K/UL (ref 0–0.2)
BASOPHILS RELATIVE PERCENT: 1.2 %
BUN BLDV-MCNC: 28 MG/DL (ref 7–20)
CALCIUM SERPL-MCNC: 9.1 MG/DL (ref 8.3–10.6)
CHLORIDE BLD-SCNC: 104 MMOL/L (ref 99–110)
CO2: 25 MMOL/L (ref 21–32)
CREAT SERPL-MCNC: 1.2 MG/DL (ref 0.8–1.3)
EOSINOPHILS ABSOLUTE: 0.5 K/UL (ref 0–0.6)
EOSINOPHILS RELATIVE PERCENT: 6.2 %
GFR AFRICAN AMERICAN: >60
GFR NON-AFRICAN AMERICAN: 58
GLUCOSE BLD-MCNC: 104 MG/DL (ref 70–99)
HCT VFR BLD CALC: 33.5 % (ref 40.5–52.5)
HEMOGLOBIN: 11.1 G/DL (ref 13.5–17.5)
LYMPHOCYTES ABSOLUTE: 1.2 K/UL (ref 1–5.1)
LYMPHOCYTES RELATIVE PERCENT: 14.5 %
MCH RBC QN AUTO: 30.7 PG (ref 26–34)
MCHC RBC AUTO-ENTMCNC: 33.2 G/DL (ref 31–36)
MCV RBC AUTO: 92.3 FL (ref 80–100)
MONOCYTES ABSOLUTE: 0.7 K/UL (ref 0–1.3)
MONOCYTES RELATIVE PERCENT: 8.4 %
NEUTROPHILS ABSOLUTE: 5.7 K/UL (ref 1.7–7.7)
NEUTROPHILS RELATIVE PERCENT: 69.7 %
OCCULT BLOOD SCREENING: NORMAL
PDW BLD-RTO: 17.7 % (ref 12.4–15.4)
PLATELET # BLD: 225 K/UL (ref 135–450)
PMV BLD AUTO: 9.2 FL (ref 5–10.5)
POTASSIUM REFLEX MAGNESIUM: 3.9 MMOL/L (ref 3.5–5.1)
RBC # BLD: 3.63 M/UL (ref 4.2–5.9)
SODIUM BLD-SCNC: 140 MMOL/L (ref 136–145)
WBC # BLD: 8.2 K/UL (ref 4–11)

## 2021-09-26 PROCEDURE — 80048 BASIC METABOLIC PNL TOTAL CA: CPT

## 2021-09-26 PROCEDURE — 6370000000 HC RX 637 (ALT 250 FOR IP): Performed by: EMERGENCY MEDICINE

## 2021-09-26 PROCEDURE — 6360000004 HC RX CONTRAST MEDICATION: Performed by: EMERGENCY MEDICINE

## 2021-09-26 PROCEDURE — 99285 EMERGENCY DEPT VISIT HI MDM: CPT

## 2021-09-26 PROCEDURE — 85025 COMPLETE CBC W/AUTO DIFF WBC: CPT

## 2021-09-26 PROCEDURE — 82270 OCCULT BLOOD FECES: CPT

## 2021-09-26 PROCEDURE — 74177 CT ABD & PELVIS W/CONTRAST: CPT

## 2021-09-26 RX ORDER — DOCUSATE SODIUM 100 MG/1
100 CAPSULE, LIQUID FILLED ORAL DAILY PRN
Qty: 30 CAPSULE | Refills: 0 | Status: ON HOLD | OUTPATIENT
Start: 2021-09-26 | End: 2022-02-02 | Stop reason: HOSPADM

## 2021-09-26 RX ORDER — BISACODYL 10 MG
10 SUPPOSITORY, RECTAL RECTAL ONCE
Status: COMPLETED | OUTPATIENT
Start: 2021-09-26 | End: 2021-09-26

## 2021-09-26 RX ORDER — MAGNESIUM CITRATE
150 SOLUTION, ORAL ORAL ONCE
Qty: 150 ML | Refills: 0 | Status: SHIPPED | OUTPATIENT
Start: 2021-09-26 | End: 2021-09-26

## 2021-09-26 RX ORDER — POLYETHYLENE GLYCOL 3350 17 G/17G
17 POWDER, FOR SOLUTION ORAL DAILY
Qty: 1530 G | Refills: 1 | Status: SHIPPED | OUTPATIENT
Start: 2021-09-26 | End: 2021-10-26

## 2021-09-26 RX ADMIN — IOPAMIDOL 80 ML: 755 INJECTION, SOLUTION INTRAVENOUS at 15:51

## 2021-09-26 RX ADMIN — BISACODYL 10 MG: 10 SUPPOSITORY RECTAL at 18:45

## 2021-09-26 ASSESSMENT — PAIN DESCRIPTION - PAIN TYPE: TYPE: ACUTE PAIN

## 2021-09-26 ASSESSMENT — PAIN DESCRIPTION - LOCATION: LOCATION: BACK;PELVIS

## 2021-09-26 ASSESSMENT — PAIN DESCRIPTION - ORIENTATION: ORIENTATION: LOWER

## 2021-09-26 NOTE — ED PROVIDER NOTES
CHIEF COMPLAINT  Rectal Bleeding      HISTORY OF PRESENT ILLNESS  Sadi Li is a [de-identified] y.o. male with a history of BPH status post chronic indwelling Iglesias, GERD, retention, hemorrhoids, renal insufficiency presents emergency department for evaluation of rectal bleeding. According to the patient, several hours ago when he had a bowel movement he had bright red blood. He notes that this was a small amount of blood. He notes that he has had some mild left lower abdominal pain for the past several weeks. He denies any fevers, nausea or vomiting. He states that he has known history of multiple internal hemorrhoids. He denies any black and tarry stool. He is not on anticoagulation. He denies excessive NSAID use. He states that he saw a GI physician last week but a rectal exam was not performed at that time. No other complaints, modifying factors or associated symptoms. I have reviewed the following from the nursing documentation. Past Medical History:   Diagnosis Date    BPH (benign prostatic hyperplasia)     Carotid stenosis 12/2013    JESU 82-73% stenosis; LICA 07-44% stenosis    Cellulitis 12/2013    LLE    Chronic back pain     Diverticular disease     Erectile dysfunction     GERD (gastroesophageal reflux disease)     History of atrial fibrillation     Hypercholesteremia     Hypertension     Lower GI bleed     MDRO (multiple drug resistant organisms) resistance 10/26/2019    urine    Neuromuscular disorder (HCC)     spasticity    Renal insufficiency     Risk for falls     uses walker    Tinea corporis 12/2013    LLE    Vitamin B12 deficiency      Past Surgical History:   Procedure Laterality Date    BACK SURGERY  2006    lower lumbar    CORONARY ARTERY BYPASS GRAFT  12/13/2013    CABG x 5 (Dr Remi Cogan), svg to diag, om1 and 3, distal rca, kelly to lad.      CYSTOSCOPY N/A 1/10/2019    CYSTOSCOPY performed by Beto Chase MD at [facility] [address] 2015    ORI    SIGMOIDOSCOPY N/A 2020    SIGMOIDOSCOPY DIAGNOSTIC FLEXIBLE performed by Fuad [facility]ost [facility] at [facility] N/A 2020    EGD BIOPSY performed by Fuad [facility]ost MD at [facility] ENDOSCOPY     Family History   Problem Relation Age of Onset    Heart Disease Father      Social History     Socioeconomic History    Marital status:      Spouse name: Not on file    Number of children: Not on file    Years of education: Not on file    Highest education level: Not on file   Occupational History    Not on file   Tobacco Use    Smoking status: Former Smoker     Quit date: 1969     Years since quittin.8    Smokeless tobacco: Never Used   Vaping Use    Vaping Use: Never used   Substance and Sexual Activity    Alcohol use: No    Drug use: No    Sexual activity: Never   Other Topics Concern    Not on file   Social History Narrative    Not on file     Social Determinants of Health     Financial Resource Strain:     Difficulty of Paying Living Expenses:    Food Insecurity:     Worried About Running Out of Food in the Last Year:     [address] in the Last Year:    Transportation Needs:     Lack of Transportation (Medical):      Lack of Transportation (Non-Medical):    Physical Activity:     Days of Exercise per Week:     Minutes of Exercise per Session:    Stress:     Feeling of Stress :    Social Connections:     Frequency of Communication with Friends and Family:     Frequency of Social Gatherings with Friends and Family:     Attends Christian Services:     Active Member of Clubs or Organizations:     Attends Club or Organization Meetings:     Marital Status:    Intimate Partner Violence:     Fear of Current or Ex-Partner:     Emotionally Abused:     Physically Abused:     Sexually Abused:      Current Facility-Administered Medications   Medication Dose Route Frequency Provider Last Rate Last Admin    bisacodyl (DULCOLAX) suppository 10 mg  10 mg Rectal Once Nidhi Mccoy MD         Current Outpatient Medications   Medication Sig Dispense Refill    docusate sodium (COLACE) 100 MG capsule Take 1 capsule by mouth daily as needed for Constipation 30 capsule 0    polyethylene glycol (GLYCOLAX) 17 GM/SCOOP powder Take 17 g by mouth daily 1530 g 1    furosemide (LASIX) 40 MG tablet TAKE 1 TABLET TWICE DAILY 180 tablet 3    Balsam [place]-Castor Oil (VENELEX) OINT ointment Apply topically daily as needed      docusate sodium (COLACE) 100 MG capsule Take 100 mg by mouth daily as needed for Constipation      gabapentin (NEURONTIN) 600 MG tablet Take 600 mg by mouth 2 times daily.  amLODIPine (NORVASC) 5 MG tablet Take 1 tablet by mouth daily 30 tablet 3    dicyclomine (BENTYL) 20 MG tablet Take 20 mg by mouth 3 times daily as needed      vitamin B-12 (CYANOCOBALAMIN) 1000 MCG tablet Take 1,000 mcg by mouth daily      aspirin 81 MG chewable tablet Take 1 tablet by mouth daily 30 tablet 3    pantoprazole (PROTONIX) 40 MG tablet Take 1 tablet by mouth every morning (before breakfast) 30 tablet 3    tamsulosin (FLOMAX) 0.4 MG capsule Take 0.8 mg by mouth daily       Melatonin 10 MG TABS Take 10 mg by mouth nightly as needed       atorvastatin (LIPITOR) 80 MG tablet Take 80 mg by mouth nightly.  zolpidem (AMBIEN) 10 MG tablet Take 10 mg by mouth nightly as needed for Sleep. Allergies   Allergen Reactions    Bactrim [Sulfamethoxazole-Trimethoprim] Rash       REVIEW OF SYSTEMS  10 systems reviewed, pertinent positives per HPI otherwise noted to be negative. PHYSICAL EXAM  BP (!) 118/57   Pulse 80   Temp 98.4 °F (36.9 °C) (Oral)   Resp 18   Ht 5' 11\" (1.803 m)   Wt 187 lb (84.8 kg)   SpO2 94%   BMI 26.08 kg/m²   GENERAL APPEARANCE: Awake and alert. Cooperative. No acute distress. HEAD: Normocephalic. Atraumatic. EYES: PERRL. EOM's grossly intact.   ENT: Mucous membranes are moist. NECK: Supple, trachea midline. No significant lymphadenopathy  HEART: RRR. No harsh murmurs. Intact radial pulses 2+ bilaterally. LUNGS: Respirations unlabored without accessory muscle use. Speaking comfortably in full sentences. ABDOMEN: Soft. Non-distended. Mild tenderness in the suprapubic and left lower quadrant region no guarding or rebound. Exam performed with chaperone. There is brown stool, no gross blood. Internal hemorrhoids appreciated, no active bleeding. Iglesias in place. EXTREMITIES: No peripheral edema. No acute deformities. SKIN: Warm and dry. No acute rashes. There is a stage I ulceration of the sacrum. NEUROLOGICAL: Alert and oriented X 3. No focal deficits  PSYCHIATRIC: Normal mood and affect. LABS  I have reviewed all labs for this visit.    Results for orders placed or performed during the hospital encounter of 09/26/21   CBC Auto Differential   Result Value Ref Range    WBC 8.2 4.0 - 11.0 K/uL    RBC 3.63 (L) 4.20 - 5.90 M/uL    Hemoglobin 11.1 (L) 13.5 - 17.5 g/dL    Hematocrit 33.5 (L) 40.5 - 52.5 %    MCV 92.3 80.0 - 100.0 fL    MCH 30.7 26.0 - 34.0 pg    MCHC 33.2 31.0 - 36.0 g/dL    RDW 17.7 (H) 12.4 - 15.4 %    Platelets 390 289 - 239 K/uL    MPV 9.2 5.0 - 10.5 fL    Neutrophils % 69.7 %    Lymphocytes % 14.5 %    Monocytes % 8.4 %    Eosinophils % 6.2 %    Basophils % 1.2 %    Neutrophils Absolute 5.7 1.7 - 7.7 K/uL    Lymphocytes Absolute 1.2 1.0 - 5.1 K/uL    Monocytes Absolute 0.7 0.0 - 1.3 K/uL    Eosinophils Absolute 0.5 0.0 - 0.6 K/uL    Basophils Absolute 0.1 0.0 - 0.2 K/uL   Basic Metabolic Panel w/ Reflex to MG   Result Value Ref Range    Sodium 140 136 - 145 mmol/L    Potassium reflex Magnesium 3.9 3.5 - 5.1 mmol/L    Chloride 104 99 - 110 mmol/L    CO2 25 21 - 32 mmol/L    Anion Gap 11 3 - 16    Glucose 104 (H) 70 - 99 mg/dL    BUN 28 (H) 7 - 20 mg/dL    CREATININE 1.2 0.8 - 1.3 mg/dL    GFR Non-African American 58 (A) >60    GFR  >60 >60 Calcium 9.1 8.3 - 10.6 mg/dL   Blood Occult Stool Screen #1   Result Value Ref Range    Occult Blood Screening       Result: Negative  Normal range: Negative   09/26/2021  15:41         RADIOLOGY  X-RAYS:  I have reviewed radiologic plain film image(s). ALL OTHER NON-PLAIN FILM IMAGES SUCH AS CT, ULTRASOUND AND MRI HAVE BEEN READ BY THE RADIOLOGIST. CT ABDOMEN PELVIS W IV CONTRAST Additional Contrast? None   Final Result   1. Large amount of stool within the rectal vault compatible with fecal impaction   2. Acute pneumonitis right lower lobe of the lung   3. Chronic left-sided pleural effusion with rounded atelectasis left lower lobe redemonstrated as well as a moderate hiatal hernia stomach   4. Cholelithiasis   5. Nonobstructive left nephrolithiasis with bilateral renal cortical cysts                ED COURSE/MDM  Patient seen and evaluated. Old records reviewed. Labs and imaging reviewed and results discussed with patient. This is a 67-year-old male who presents for evaluation of rectal bleeding with history of hemorrhoids. Patient arrives with stable vital signs. He is in no acute distress. On rectal exam he has internal hemorrhoids that are not actively believe bleeding. He has no active bleeding, brown stool. ED evaluation included basic blood work, CT abdomen pelvis. Patient's hemoglobin is 11.1 which is actually increased from his most recent laboratory evaluation. Renal function is at his baseline. Fecal occult blood testing was negative for blood. CT findings revealed fecal impaction, constipation. It also showed previously known pleural effusion, pneumonitis otherwise no GI source of bleeding. Due to stable hemoglobin, no active rectal bleeding, negative occult blood, no acute findings on CT abdomen pelvis, patient will be discharged and advised on GI follow-up.   He was advised to call his GI physician to schedule follow-up and outpatient evaluation for intermittently bleeding internal hemorrhoids. He did undergo fecal disimpaction followed by enema with good result. We did speak with the on-call  who is familiar with his case and he has multiple workers on his case. Patient has made comments that he does not want any additional help at home. He does have a visiting nurse that assist him with activities of daily life. Social work will follow up with the patient tomorrow. The patient will be discharged from the emergency department. The patient was counseled on their diagnosis and any medications prescribed. They were advised on the need for PCP followup. They were counseled on the need to return to the emergency department if any of their symptoms were to worsen, change or have any other concerns. Discharged in stable condition. Patient was given scripts for the following medications. I counseled patient how to take these medications. New Prescriptions    DOCUSATE SODIUM (COLACE) 100 MG CAPSULE    Take 1 capsule by mouth daily as needed for Constipation    POLYETHYLENE GLYCOL (GLYCOLAX) 17 GM/SCOOP POWDER    Take 17 g by mouth daily       CLINICAL IMPRESSION  1. Rectal bleeding    2. Constipation, unspecified constipation type        Blood pressure (!) 118/57, pulse 80, temperature 98.4 °F (36.9 °C), temperature source Oral, resp. rate 18, height 5' 11\" (1.803 m), weight 187 lb (84.8 kg), SpO2 94 %. DISPOSITION  Kelly Mallory was discharged to home in stable condition. This chart was generated in part by using Dragon Dictation system and may contain errors related to that system including errors in grammar, punctuation, and spelling, as well as words and phrases that may be inappropriate. If there are any questions or concerns please feel free to contact the dictating provider for clarification.      Black Nguyen MD  09/26/21 [id number]

## 2021-09-27 ENCOUNTER — HOSPITAL ENCOUNTER (EMERGENCY)
Age: 81
Discharge: NURSING, REHAB OR LONG-TERM CARE | End: 2021-09-27
Attending: EMERGENCY MEDICINE
Payer: MEDICARE

## 2021-09-27 ENCOUNTER — APPOINTMENT (OUTPATIENT)
Dept: GENERAL RADIOLOGY | Age: 81
End: 2021-09-27
Payer: MEDICARE

## 2021-09-27 VITALS
HEIGHT: 71 IN | WEIGHT: 187 LBS | BODY MASS INDEX: 26.18 KG/M2 | TEMPERATURE: 98.8 F | DIASTOLIC BLOOD PRESSURE: 55 MMHG | RESPIRATION RATE: 16 BRPM | SYSTOLIC BLOOD PRESSURE: 108 MMHG | HEART RATE: 65 BPM | OXYGEN SATURATION: 94 %

## 2021-09-27 DIAGNOSIS — Z78.9 NEED FOR FOLLOW-UP BY SOCIAL WORKER: Primary | ICD-10-CM

## 2021-09-27 LAB
ANION GAP SERPL CALCULATED.3IONS-SCNC: 10 MMOL/L (ref 3–16)
BASOPHILS ABSOLUTE: 0.1 K/UL (ref 0–0.2)
BASOPHILS RELATIVE PERCENT: 1.2 %
BUN BLDV-MCNC: 25 MG/DL (ref 7–20)
CALCIUM SERPL-MCNC: 8.1 MG/DL (ref 8.3–10.6)
CHLORIDE BLD-SCNC: 106 MMOL/L (ref 99–110)
CO2: 24 MMOL/L (ref 21–32)
CREAT SERPL-MCNC: 1.2 MG/DL (ref 0.8–1.3)
EOSINOPHILS ABSOLUTE: 0.4 K/UL (ref 0–0.6)
EOSINOPHILS RELATIVE PERCENT: 8.9 %
GFR AFRICAN AMERICAN: >60
GFR NON-AFRICAN AMERICAN: 58
GLUCOSE BLD-MCNC: 206 MG/DL (ref 70–99)
HCT VFR BLD CALC: 29.8 % (ref 40.5–52.5)
HEMOGLOBIN: 9.7 G/DL (ref 13.5–17.5)
LYMPHOCYTES ABSOLUTE: 1.5 K/UL (ref 1–5.1)
LYMPHOCYTES RELATIVE PERCENT: 31.5 %
MAGNESIUM: 2 MG/DL (ref 1.8–2.4)
MCH RBC QN AUTO: 30.9 PG (ref 26–34)
MCHC RBC AUTO-ENTMCNC: 32.5 G/DL (ref 31–36)
MCV RBC AUTO: 94.9 FL (ref 80–100)
MONOCYTES ABSOLUTE: 0.4 K/UL (ref 0–1.3)
MONOCYTES RELATIVE PERCENT: 7.5 %
NEUTROPHILS ABSOLUTE: 2.5 K/UL (ref 1.7–7.7)
NEUTROPHILS RELATIVE PERCENT: 50.9 %
PDW BLD-RTO: 18.1 % (ref 12.4–15.4)
PLATELET # BLD: 164 K/UL (ref 135–450)
PMV BLD AUTO: 9.3 FL (ref 5–10.5)
POTASSIUM REFLEX MAGNESIUM: 3.5 MMOL/L (ref 3.5–5.1)
RBC # BLD: 3.14 M/UL (ref 4.2–5.9)
SODIUM BLD-SCNC: 140 MMOL/L (ref 136–145)
WBC # BLD: 4.8 K/UL (ref 4–11)

## 2021-09-27 PROCEDURE — 97162 PT EVAL MOD COMPLEX 30 MIN: CPT

## 2021-09-27 PROCEDURE — 99285 EMERGENCY DEPT VISIT HI MDM: CPT

## 2021-09-27 PROCEDURE — 83735 ASSAY OF MAGNESIUM: CPT

## 2021-09-27 PROCEDURE — 85025 COMPLETE CBC W/AUTO DIFF WBC: CPT

## 2021-09-27 PROCEDURE — 2580000003 HC RX 258: Performed by: EMERGENCY MEDICINE

## 2021-09-27 PROCEDURE — 71045 X-RAY EXAM CHEST 1 VIEW: CPT

## 2021-09-27 PROCEDURE — 97530 THERAPEUTIC ACTIVITIES: CPT

## 2021-09-27 PROCEDURE — 97167 OT EVAL HIGH COMPLEX 60 MIN: CPT

## 2021-09-27 PROCEDURE — 6370000000 HC RX 637 (ALT 250 FOR IP): Performed by: EMERGENCY MEDICINE

## 2021-09-27 PROCEDURE — 97535 SELF CARE MNGMENT TRAINING: CPT

## 2021-09-27 PROCEDURE — 80048 BASIC METABOLIC PNL TOTAL CA: CPT

## 2021-09-27 RX ORDER — TAMSULOSIN HYDROCHLORIDE 0.4 MG/1
0.8 CAPSULE ORAL DAILY
Status: DISCONTINUED | OUTPATIENT
Start: 2021-09-27 | End: 2021-09-27 | Stop reason: HOSPADM

## 2021-09-27 RX ORDER — ATORVASTATIN CALCIUM 20 MG/1
10 TABLET, FILM COATED ORAL NIGHTLY
Status: DISCONTINUED | OUTPATIENT
Start: 2021-09-27 | End: 2021-09-27 | Stop reason: HOSPADM

## 2021-09-27 RX ORDER — SODIUM CHLORIDE, SODIUM LACTATE, POTASSIUM CHLORIDE, AND CALCIUM CHLORIDE .6; .31; .03; .02 G/100ML; G/100ML; G/100ML; G/100ML
1000 INJECTION, SOLUTION INTRAVENOUS ONCE
Status: COMPLETED | OUTPATIENT
Start: 2021-09-27 | End: 2021-09-27

## 2021-09-27 RX ORDER — ASPIRIN 81 MG/1
81 TABLET, CHEWABLE ORAL DAILY
Status: DISCONTINUED | OUTPATIENT
Start: 2021-09-27 | End: 2021-09-27 | Stop reason: HOSPADM

## 2021-09-27 RX ORDER — GABAPENTIN 300 MG/1
600 CAPSULE ORAL 2 TIMES DAILY
Status: DISCONTINUED | OUTPATIENT
Start: 2021-09-27 | End: 2021-09-27 | Stop reason: HOSPADM

## 2021-09-27 RX ORDER — PANTOPRAZOLE SODIUM 40 MG/1
40 TABLET, DELAYED RELEASE ORAL
Status: DISCONTINUED | OUTPATIENT
Start: 2021-09-27 | End: 2021-09-27 | Stop reason: HOSPADM

## 2021-09-27 RX ORDER — AMLODIPINE BESYLATE 5 MG/1
5 TABLET ORAL DAILY
Status: DISCONTINUED | OUTPATIENT
Start: 2021-09-27 | End: 2021-09-27 | Stop reason: HOSPADM

## 2021-09-27 RX ORDER — FUROSEMIDE 40 MG/1
40 TABLET ORAL 2 TIMES DAILY
Status: DISCONTINUED | OUTPATIENT
Start: 2021-09-27 | End: 2021-09-27 | Stop reason: HOSPADM

## 2021-09-27 RX ADMIN — GABAPENTIN 600 MG: 300 CAPSULE ORAL at 08:16

## 2021-09-27 RX ADMIN — TAMSULOSIN HYDROCHLORIDE 0.8 MG: 0.4 CAPSULE ORAL at 08:16

## 2021-09-27 RX ADMIN — PANTOPRAZOLE SODIUM 40 MG: 40 TABLET, DELAYED RELEASE ORAL at 08:15

## 2021-09-27 RX ADMIN — SODIUM CHLORIDE, POTASSIUM CHLORIDE, SODIUM LACTATE AND CALCIUM CHLORIDE 1000 ML: 600; 310; 30; 20 INJECTION, SOLUTION INTRAVENOUS at 12:42

## 2021-09-27 RX ADMIN — ASPIRIN 81 MG: 81 TABLET, CHEWABLE ORAL at 08:16

## 2021-09-27 RX ADMIN — SODIUM CHLORIDE, POTASSIUM CHLORIDE, SODIUM LACTATE AND CALCIUM CHLORIDE 1000 ML: 600; 310; 30; 20 INJECTION, SOLUTION INTRAVENOUS at 13:33

## 2021-09-27 ASSESSMENT — ENCOUNTER SYMPTOMS
VOMITING: 0
NAUSEA: 0
CONSTIPATION: 1
DIARRHEA: 0

## 2021-09-27 NOTE — ED NOTES
Pt had 2 large soft BMs;   RN and RT Cleaned patient and placed cream on pressure sores to bilateral butt cheeks. Pt given cream and prescription for Mag Citrate as requested by patient. Pt concerned about cleaning himself when he goes home tonight. RN questioned patient about daughter coming to stay with or check on him tonight. Pt states daughter can't because Romina Gomez has two cats\". RN instructed patient to have daughter come check on him and informed patient social service is to contact him in the morning.       Kate Bustos RN  09/26/21 [id number]

## 2021-09-27 NOTE — PROGRESS NOTES
Occupational Therapy   Occupational Therapy Initial Assessment/Tx Note  Date: 2021   Patient Name: Nakul Daniel  MRN: [id number]     : 1940    Date of Service: 2021  Assessment: Pt admitted due to EMS concerns of pt's living conditions and ability to care for himself. Pt does have modified ways of managing his ADLs at home, but needs to be able to transfer to/from a BSC, w/c, and lift chair. Pt reports he can typically do so independently, and as recently as  was assessed by this therapist to be able to do so with CGA/min assist. Today, pt required at least mod assist x2 skilled therapists. He is also incontinent, reporting he could manage this at home by rolling in his lift chair, but anticipate pt would not be able to clean himself thoroughly, further compromising his skin integrity. Pt has not received his usual home care for weeks and no one has been able to contact the agency. Pt is not safe to return home in his current state and would benefit from continued inpt OT/PT at d/c. If pt were to refuse this level of care, perhaps he should consider a new home care agency and increase his hours as much as possible. Discharge Recommendations: Nakul Daniel scored a 14/24 on the AM-PAC ADL Inpatient form. Current research shows that an AM-PAC score of 17 or less is typically not associated with a discharge to the patient's home setting. Based on the patient's AM-PAC score and their current ADL deficits, it is recommended that the patient have 3-5 sessions per week of Occupational Therapy at d/c to increase the patient's independence. Please see assessment section for further patient specific details. OT Equipment Recommendations  Equipment Needed: No    Assessment   Performance deficits / Impairments: Decreased functional mobility ; Decreased ADL status; Decreased safe awareness;Decreased endurance  Treatment Diagnosis: Decreased activity tolerance, impaired ADLs and mobility  Decision Making: High Complexity  REQUIRES OT FOLLOW UP: Yes  Activity Tolerance  Activity Tolerance: Patient Tolerated treatment well  Activity Tolerance: lightheaded upon sitting up. previous BP was 90s/40s, increased to 120s/80s seated EOB. Safety Devices  Safety Devices in place: Yes  Type of devices: Left in bed;Nurse notified;Call light within reach           Treatment Diagnosis: Decreased activity tolerance, impaired ADLs and mobility      Restrictions  Position Activity Restriction  Other position/activity restrictions: no activity restrictions    Subjective   General  Chart Reviewed: Yes  Additional Pertinent Hx: [de-identified] y.o. M to ED 9/27. Pt had [facility] to [facility] on Friday when he fell trying to exercise on his ramp, upon return home via ambulance, pt was incontinent of bowel and EMS was concerned with pt's living conditions and inability to care for himself. Family / Caregiver Present: No  Referring Practitioner: Lucas  Subjective  Subjective: Pt in bed on arrival. Pleasant and cooperative. Reports he has felt weak since falling on Friday.   General Comment  Comments: Has pain on his back from his ulcers, tooth pain, and chronic pain from neuropathy - RN aware    Social/Functional History  Social/Functional History  Lives With: Alone  Type of Home: House  Home Layout: One level  Home Access: Ramped entrance  Bathroom Shower/Tub:  (sponge bathes)  Bathroom Equipment: 3-in-1 commode (aides empty (1x/week))  Home Equipment: Wheelchair-manual, Lift chair, Reacher, Sock aid (sleeps in the lift chair, spends most of his day there)  Receives Help From: Home health (aides were coming on Fridays, but have not come for 3 weeks)  ADL Assistance: Needs assistance (pt dresses, sponge bathes, toilets using BSC next to bed and has indwelling cath; aides empty BSC)  Homemaking Assistance:  (aides)  Ambulation Assistance:  (does not ambulate, uses w/c for distances within the home)  Transfer Assistance: Independent  Occupation: Strengthening, Balance Training, Functional Mobility Training, Endurance Training, Wheelchair Mobility Training, Safety Education & Training, Equipment Evaluation, Education, & procurement, Patient/Caregiver Education & Training, Self-Care / ADL      AM-PAC Score     AM-PAC Inpatient Daily Activity Raw Score: 14 (09/27/21 1001)  AM-PAC Inpatient ADL T-Scale Score : 33.39 (09/27/21 1001)  ADL Inpatient CMS 0-100% Score: 59.67 (09/27/21 1001)  ADL Inpatient CMS G-Code Modifier : CK (09/27/21 1001)    Goals  Short term goals  Time Frame for Short term goals: by D/C  Short term goal 1: Transfer to/from [facility] with CGA - Not met  Short term goal 2: Perform toileting on BSC with CGA - Not met  Short term goal 3: Roll side to side in bed with CGA - Not met       Therapy Time   Individual Concurrent Group Co-treatment   Time In 0905         Time Out 0959         Minutes 54          Timed Code Tx Min: 39  Total Tx TIme: [address] [facility], [place], OT

## 2021-09-27 NOTE — DISCHARGE INSTR - COC
Continuity of Care Form    Patient Name: Eugenia Rodgers   :  1940  MRN:  [id number]    Admit date:  2021  Discharge date:  21    Code Status Order: Prior   Advance Directives:     Admitting Physician:  No admitting provider for patient encounter. PCP: Zita Ruby    Discharging Nurse: [facility] Unit/Room#: T29/J80-24  Discharging Unit Phone Number: [phone]    Emergency Contact:   Extended Emergency Contact Information  Primary Emergency Contact: [facility] Phone: [phone]  Work Phone: [phone]  Mobile Phone: [phone]  Relation: Child  Secondary Emergency Contact: Dana Lentz  Address: [place]  Home Phone: [phone]  Relation: Other    Past Surgical History:  Past Surgical History:   Procedure Laterality Date    BACK SURGERY      lower lumbar    CORONARY ARTERY BYPASS GRAFT  2013    CABG x 5 (Dr Precious Michel), svg to diag, om1 and 3, distal rca, kelly to lad.      CYSTOSCOPY N/A 1/10/2019    CYSTOSCOPY performed by Jaylin Hayward MD at [facility] 1690 Right 2015    ORI    SIGMOIDOSCOPY N/A 2020    SIGMOIDOSCOPY DIAGNOSTIC FLEXIBLE performed by Nile Daniel MD at [place] [address] 2020    EGD BIOPSY [facility] by Nile Danile MD at [facility] ENDOSCOPY       Immunization History:   Immunization History   Administered Date(s) Administered    COVID-19, Moderna, PF, 100mcg/0.5mL 2021, 07/15/2021    Influenza A ([id number]) Vaccine PF IM 12/10/2009    Influenza Vaccine, unspecified formulation 2012, 2014, 10/13/2015, 2016, 2017, 10/26/2018, 2019    Influenza Virus Vaccine 2005, 2006, 2012, 2013, 10/13/2015, 2019    Influenza Whole 10/01/2007, 2010, 2011, 2012, 10/13/2015    Influenza, High Dose (Fluzone 65 yrs and older) 2014, 10/20/2020    PPD Planned Expiration Resolved Resolved By    None active    Resolved    MDRO (multi-drug resistant organism) 06/05/21 06/08/21 06/05/21 Culture, Urine   09/27/21 Lew Correa RN    New urine cx on 6/20/2021 with no growth/was negative    COVID-19 Rule Out 04/24/21 04/24/21 04/24/21 COVID-19, Rapid (Ordered)   04/24/21 Rule-Out Test Resulted    C-diff Rule Out 04/21/21 04/21/21 04/22/21 GI Bacterial Pathogens By PCR (Ordered)   04/22/21 Darlene Turner RN    Order noted to be discontinued by MD; not included in GI pathogens order    COVID-19 Rule Out 02/19/21 02/19/21 02/19/21 COVID-19, Rapid (Ordered)   02/19/21 Rule-Out Test Resulted    C-diff Rule Out 12/13/20 12/13/20 12/13/20 Clostridium difficile toxin/antigen (Ordered)   02/16/21 Thelma Hudson RN    No stool ever sent.      COVID-19 Rule Out 06/10/20 06/10/20 06/10/20 COVID-19 (Ordered)   06/10/20 Rule-Out Test Resulted    C-diff Rule Out 06/09/20 06/09/20 06/09/20 GI Bacterial Pathogens By PCR (Ordered)   06/10/20 Rule-Out Test Resulted    C-diff Rule Out 06/09/20 06/09/20 06/09/20 Clostridium difficile toxin/antigen (Ordered)   06/09/20 Rule-Out Test Resulted    C-diff Rule Out 05/03/20 05/04/20 05/04/20 C. difficile toxin Molecular (Ordered)   05/05/20 Rule-Out Test Resulted    MDRO (multi-drug resistant organism) 05/02/20 05/04/20 05/02/20 Culture, Urine   04/22/21 Lew Correa, RN    Has had new urine culture on 4/17/21 with mixed anne; no MDRO noted    COVID-19 Rule Out 05/03/20 05/03/20 05/03/20 COVID-19 (Ordered)   05/03/20 Rule-Out Test Resulted    C-diff Rule Out 05/02/20 05/02/20 05/03/20 Clostridium difficile toxin/antigen (Ordered)   05/03/20 Rule-Out Test Resulted    MRSA 02/07/20 02/08/20 02/07/20 MRSA DNA Probe, Nasal   05/04/20 Marcelino Leon RN    MDRO (multi-drug resistant organism) 10/26/19 10/28/19 11/19/19 Urine culture   01/16/20 Marcelino Leon RN          Nurse Assessment:  Last Vital Signs: BP (!) 78/36 Pulse 51   Temp 98.8 °F (37.1 °C)   Resp 16   Ht 5' 11\" (1.803 m)   Wt 187 lb (84.8 kg)   SpO2 94%   BMI 26.08 kg/m²     Last documented pain score (0-10 scale):    Last Weight:   Wt Readings from Last 1 Encounters:   09/27/21 187 lb (84.8 kg)     Mental Status:  alert    IV Access:  - None    Nursing Mobility/ADLs:  Walking   Dependent  Transfer  Assisted  Bathing  Assisted  Dressing  Assisted  Toileting  Dependent  Feeding  Assisted  Med Admin  Dependent  Med Delivery   whole    Wound Care Documentation and Therapy:  Wound 06/09/20 Buttocks Mid;Left;Right -coccyx-gluteal cleft-posterior thighs    IAD/moisture associated dermatitis - diarrhea/?C-dif (Active)   Number of days: 474       Wound 09/21/20 Arm Lower 2 skin tears. Pink wound bed. (Active)   Number of days: 371       Wound 02/15/21 Buttocks Right;Left IAD/MASD  excoriation (Active)   Number of days: 224       Wound 04/22/21 Heel Right Unstageable pressure injury- pOA (Active)   Number of days: 158       Wound 04/22/21 Foot Right;Plantar 1st metarsal head - trauma from home (Active)   Number of days: 158       Wound 04/22/21 Leg Right;Medial;Lower Medical device pressure injuries, stage 2 X 2 horizontyal lines intact serous filled blisters  - POA (Active)   Number of days: 158       Wound 06/19/21 Scrotum Posterior (Active)   Number of days: 99       Wound 06/19/21 Finger (Comment which one) Anterior;Right (Active)   Number of days: 99       Wound 06/19/21 Knee Right;Left bilateral knee abrasions with dry intact scabns scattered over bilaterally (Active)   Number of days: 99       Wound 06/19/21 Heel Left epidermis peeling off, dry redness underneath (Active)   Number of days: 99        Elimination:  Continence:   · Bowel: No  · Bladder: No  Urinary Catheter: None   Colostomy/Ileostomy/Ileal Conduit: No       Date of Last BM: *9/27/21  No intake or output data in the 24 hours ending 09/27/21 1334  No intake/output data recorded.     Safety Concerns: History of Falls (last 30 days) and At Risk for Falls    Impairments/Disabilities:      confusion    Nutrition Therapy:  Current Nutrition Therapy:   - Oral Diet:  General    Routes of Feeding: Oral  Liquids: Thin Liquids  Daily Fluid Restriction: no  Last Modified Barium Swallow with Video (Video Swallowing Test): not done    Rehab Therapies: Physical Therapy and Occupational Therapy  Weight Bearing Status/Restrictions: No weight bearing restirctions  Other Medical Equipment (for information only, NOT a DME order):  wheelchair    Patient's personal belongings (please select all that are sent with patient):  clothing    RN SIGNATURE:  Electronically signed by Namrata Quevedo RN on 9/27/21 at 1:39 PM EDT    CASE MANAGEMENT/SOCIAL WORK SECTION    Inpatient Status Date: 9/27/21    Readmission Risk Assessment Score:  Readmission Risk              Risk of Unplanned Readmission:  0           Discharging to Facility/ Agency   · Name: [facility])  · Address: [facility]  · Phone:[phone]  · FNW:[phone]      / signature: Electronically signed by Namrata Quevedo RN on 9/27/21 at 1:36 PM EDT    PHYSICIAN SECTION    Prognosis: Fair    Condition at Discharge: Stable    Rehab Potential (if transferring to Rehab): Fair    Recommended Labs or Other Treatments After Discharge:     Physician Certification: I certify the above information and transfer of Gilson Montoya  is necessary for the continuing treatment of the diagnosis listed and that he requires [facility] for less 30 days.      Update Admission H&P: No change in H&P    PHYSICIAN SIGNATURE:  Electronically signed by Amos Fay MD on 9/27/21 at 2:40 PM EDT

## 2021-09-27 NOTE — ED NOTES
[facility] here to transport patient      [facility]  09/27/21 9804

## 2021-09-27 NOTE — ED PROVIDER NOTES
Date ofevaluation: 9/27/2021    Chief Complaint   Other (patient was discharged from [facility] after being treated for constipation. [facility] took patient to his residence and felt they could not leave him there due to the patient not having any help and defecating on himself. Patient had no complaints upon arrival. )      Nursing Notes, Past Medical Hx, Past Surgical Hx, Social Hx, Allergies, and Family Hx were reviewed. History of Present Illness     Anna Neumann is a [de-identified] y.o. male BPH status post chronic indwelling Iglesias, GERD, hemorrhoids, renal insufficiencywho presents after concern for patient unable to care for himself. Patient was seen at [address] [place] [address] emergency department earlier today for constipation. Patient had extensive work-up including CT scan which showed a large amount of stool. Patient was given magnesium citrate and on his way with transportation home had a large bowel movement. Transportation was concerned that patient's living conditions were substandard and patient had concerns that he would be unable to clean up the large amount of stool on his bottom and legs. For this reason patient was transferred to the emergency department, requested this emergency department. Currently patient is asymptomatic has no medical complaints and has no pain. He does have chronic wounds on his buttocks which she said are being irritated by the large amount of stool. Of note patient does have an indwelling Iglesias catheter. Review of Systems     Review of Systems   All other systems reviewed and are negative.       Past Medical, Surgical, Family, and Social History         Diagnosis Date    BPH (benign prostatic hyperplasia)     Carotid stenosis 12/2013    JESU 96-32% stenosis; LICA 55-02% stenosis    Cellulitis 12/2013    LLE    Chronic back pain     Diverticular disease     Erectile dysfunction     GERD (gastroesophageal reflux disease)     History of atrial fibrillation     Hypercholesteremia     Hypertension     Lower GI bleed     MDRO (multiple drug resistant organisms) resistance 10/26/2019    urine    Neuromuscular disorder (HCC)     spasticity    Renal insufficiency     Risk for falls     uses walker    Tinea corporis 12/2013    LLE    Vitamin B12 deficiency          Procedure Laterality Date    BACK SURGERY  2006    lower lumbar    CORONARY ARTERY BYPASS GRAFT  12/13/2013    CABG x 5 (Dr Leticia Chao), svg to diag, om1 and 3, distal rca, kelly to lad.  CYSTOSCOPY N/A 1/10/2019    [facility]    ORIF    SIGMOIDOSCOPY N/A 6/11/2020    SIGMOIDOSCOPY DIAGNOSTIC FLEXIBLE performed by Fabián Davis MD at [facility] 27 N/A 5/4/2020    EGD BIOPSY performed by Fabián Davis MD at [facility] 28     His family history includes Heart Disease in his father. He reports that he quit smoking about 51 years ago. He has never used smokeless tobacco. He reports that he does not drink alcohol and does not use drugs. Medications     Previous Medications    AMLODIPINE (NORVASC) 5 MG TABLET    Take 1 tablet by mouth daily    ASPIRIN 81 MG CHEWABLE TABLET    Take 1 tablet by mouth daily    ATORVASTATIN (LIPITOR) 80 MG TABLET    Take 80 mg by mouth nightly. BALSAM PERU-CASTOR OIL (VENELEX) OINT OINTMENT    Apply topically daily as needed    DICYCLOMINE (BENTYL) 20 MG TABLET    Take 20 mg by mouth 3 times daily as needed    DOCUSATE SODIUM (COLACE) 100 MG CAPSULE    Take 100 mg by mouth daily as needed for Constipation    DOCUSATE SODIUM (COLACE) 100 MG CAPSULE    Take 1 capsule by mouth daily as needed for Constipation    FUROSEMIDE (LASIX) 40 MG TABLET    TAKE 1 TABLET TWICE DAILY    GABAPENTIN (NEURONTIN) 600 MG TABLET    Take 600 mg by mouth 2 times daily.     MELATONIN 10 MG TABS    Take 10 mg by mouth nightly as needed     PANTOPRAZOLE (PROTONIX) 40 MG TABLET    Take 1 tablet by mouth every morning (before breakfast)    POLYETHYLENE GLYCOL (GLYCOLAX) 17 GM/SCOOP POWDER    Take 17 g by mouth daily    TAMSULOSIN (FLOMAX) 0.4 MG CAPSULE    Take 0.8 mg by mouth daily     VITAMIN B-12 (CYANOCOBALAMIN) 1000 MCG TABLET    Take 1,000 mcg by mouth daily    ZOLPIDEM (AMBIEN) 10 MG TABLET    Take 10 mg by mouth nightly as needed for Sleep. Allergies     He is allergic to bactrim [sulfamethoxazole-trimethoprim]. Physical Exam     INITIAL VITALS: BP (!) 116/51   Pulse 69   Temp 98.9 °F (37.2 °C) (Oral)   Resp 20   SpO2 94%    Physical Exam  Vitals reviewed. Constitutional:       General: He is not in acute distress. Appearance: He is normal weight. He is not toxic-appearing. HENT:      Head: Normocephalic and atraumatic. Nose: No congestion or rhinorrhea. Mouth/Throat:      Mouth: Mucous membranes are moist.      Pharynx: Oropharynx is clear. Eyes:      Conjunctiva/sclera: Conjunctivae normal.      Pupils: Pupils are equal, round, and reactive to light. Cardiovascular:      Rate and Rhythm: Normal rate and regular rhythm. Heart sounds: No murmur heard. No friction rub. No gallop. Pulmonary:      Effort: Pulmonary effort is normal. No respiratory distress. Breath sounds: Normal breath sounds. No wheezing or rales. Abdominal:      General: Abdomen is flat. There is no distension. Palpations: Abdomen is soft. Tenderness: There is no abdominal tenderness. There is no guarding or rebound. Genitourinary:     Comments: Brown stool   Musculoskeletal:         General: Normal range of motion. Cervical back: Normal range of motion and neck supple. No muscular tenderness. Right lower leg: No edema. Left lower leg: No edema. Skin:     General: Skin is warm and dry. Neurological:      General: No focal deficit present. Mental Status: He is alert. Mental status is at baseline. Cranial Nerves: No cranial nerve deficit. Motor: No weakness. Diagnostic Results         RADIOLOGY:  No orders to display         LABS:   Labs Reviewed - No data to display      RECENTVITALS:  BP: (!) 116/51, Temp: 98.9 °F (37.2 °C), Pulse: 69, Resp: 20     Procedures         ED Course     The patient was given the following medications:  No orders of the defined types were placed in this encounter. CONSULTS:  None    MEDICAL DECISION MAKING     Raj Harris is a [de-identified] y.o. male presenting with concern for ability to clean himself at home. On arrival patient has large amount of brown stool, he was cleaned. There was no evidence of blood in his stool. Patient was otherwise asymptomatic, no abdominal pain, abdomen was soft and nontender. Vital signs reviewed and were stable. Patient's needs are primarily social, will plan to contact social work in the morning to assist patient with his home health care needs. This time I am going off service and my colleague Dr. Torres Strauss will resume care of this patient his duties include follow-up with social work. This patient was also evaluated by the attending physician. All care plans were discussed and agreedupon. Clinical Impression     1. Need for follow-up by         Disposition/Plan     PATIENT REFERRED TO:  No follow-up provider specified.     DISCHARGE MEDICATIONS:  New Prescriptions    No medications on file       DISPOSITION          Sara Felder MD  Resident  09/27/21 4699

## 2021-09-27 NOTE — ED PROVIDER NOTES
[address] [facility] [address] ENCOUNTER          ATTENDING PHYSICIAN NOTE       Date of evaluation: 9/27/2021    ADDENDUM:      Care of this patient was assumed from Dr. Mirella Cohn. The patient was seen for Other (patient was discharged from [facility] after being treated for constipation. [facility] took patient to his residence and felt they could not leave him there due to the patient not having any help and defecating on himself. Patient had no complaints upon arrival. )  . The patient's initial evaluation and plan have been discussed with the prior provider who initially evaluated the patient. Nursing Notes, Past Medical Hx, Past Surgical Hx, Social Hx, Allergies, and Family Hx were all reviewed. Diagnostic Results     EKG       RADIOLOGY:  XR CHEST PORTABLE   Final Result      Perihilar interstitial prominence which may be due to pulmonary edema or atypical pneumonia. Small bilateral pleural effusions. Stable cardiac mediastinal silhouette status post sternotomy.           LABS:   Results for orders placed or performed during the hospital encounter of 09/27/21   CBC auto differential   Result Value Ref Range    WBC 4.8 4.0 - 11.0 K/uL    RBC 3.14 (L) 4.20 - 5.90 M/uL    Hemoglobin 9.7 (L) 13.5 - 17.5 g/dL    Hematocrit 29.8 (L) 40.5 - 52.5 %    MCV 94.9 80.0 - 100.0 fL    MCH 30.9 26.0 - 34.0 pg    MCHC 32.5 31.0 - 36.0 g/dL    RDW 18.1 (H) 12.4 - 15.4 %    Platelets 437 682 - 163 K/uL    MPV 9.3 5.0 - 10.5 fL    Neutrophils % 50.9 %    Lymphocytes % 31.5 %    Monocytes % 7.5 %    Eosinophils % 8.9 %    Basophils % 1.2 %    Neutrophils Absolute 2.5 1.7 - 7.7 K/uL    Lymphocytes Absolute 1.5 1.0 - 5.1 K/uL    Monocytes Absolute 0.4 0.0 - 1.3 K/uL    Eosinophils Absolute 0.4 0.0 - 0.6 K/uL    Basophils Absolute 0.1 0.0 - 0.2 K/uL   Basic Metabolic Panel w/ Reflex to MG   Result Value Ref Range    Sodium 140 136 - 145 mmol/L    Potassium reflex Magnesium 3.5 3.5 - 5.1 mmol/L Chloride 106 99 - 110 mmol/L    CO2 24 21 - 32 mmol/L    Anion Gap 10 3 - 16    Glucose 206 (H) 70 - 99 mg/dL    BUN 25 (H) 7 - 20 mg/dL    CREATININE 1.2 0.8 - 1.3 mg/dL    GFR Non-African American 58 (A) >60    GFR African American >60 >60    Calcium 8.1 (L) 8.3 - 10.6 mg/dL   Magnesium   Result Value Ref Range    Magnesium 2.00 1.80 - 2.40 mg/dL       RECENT VITALS:  BP: (!) 96/40, Temp: 98.8 °F (37.1 °C), Pulse: 53, Resp: 16, SpO2: 92 %     Procedures         ED Course     The patient was given the following medications:  Orders Placed This Encounter   Medications    amLODIPine (NORVASC) tablet 5 mg    aspirin chewable tablet 81 mg    atorvastatin (LIPITOR) tablet 10 mg    furosemide (LASIX) tablet 40 mg    gabapentin (NEURONTIN) capsule 600 mg    pantoprazole (PROTONIX) tablet 40 mg    tamsulosin (FLOMAX) capsule 0.8 mg    lactated ringers bolus    lactated ringers bolus       CONSULTS:  IP CONSULT TO SOCIAL WORK    MEDICAL DECISION MAKING / ASSESSMENT / Uriah Michoacano is a [de-identified] y.o. male who was turned over pending social work evaluation for possible placement. Briefly, this is a chronically ill gentleman who has had frequent ED and hospital utilization. He was turned over to me pending social work evaluation. He was able to eat breakfast.  During the course of the morning, his blood pressure diminished slightly although it responded with IV fluids. His BUN is elevated suggesting some prerenal dehydration. He was accepted for skilled nursing facility placement. He will be discharged to a skilled nursing facility. At time of discharge, his systolic blood pressure was greater than 100. Clinical Impression     1. Need for follow-up by         Disposition     PATIENT REFERRED TO:  No follow-up provider specified.     DISCHARGE MEDICATIONS:  New Prescriptions    No medications on file       DISPOSITION Decision To Discharge 09/27/2021 02:39:50 PM       Khalif Garcia MD  09/27/21 0916

## 2021-09-27 NOTE — ED NOTES
Report given to Marisabel Leyva;   Pt en route to private residence.       Lamar Malik RN  09/26/21 0307

## 2021-09-27 NOTE — PROGRESS NOTES
Physical Therapy    Facility/Department: [address] [place] [address]  Initial Assessment / treatment    NAME: Josué Mark  : 1940  MRN: [id number]    Date of Service: 2021    Discharge Recommendations: Josué Mark scored a 7/24 on the AM-PAC short mobility form. Current research shows that an AM-PAC score of 17 or less is typically not associated with a discharge to the patient's home setting. Based on the patient's AM-PAC score and their current functional mobility deficits, it is recommended that the patient have 3-5 sessions per week of Physical Therapy at d/c to increase the patient's independence. Please see assessment section for further patient specific details. If patient discharges prior to next session this note will serve as a discharge summary. Please see below for the latest assessment towards goals. PT Equipment Recommendations  Equipment Needed: No    Assessment   Body structures, Functions, Activity limitations: Decreased functional mobility   Assessment: Pt is [de-identified] y.o. male admitted with weakness, unable to care for self at home. Pt is from home alone and typically has an aide 1x/week but states the aide has not come in several weeks. Pt spends most of the day in a lift chair or w/c transferring to [facility] for toileting needs. Pt was incontinent of bowels today and required total assist with clean up. Pt is not safe to be home alone as he required 2 person assist for safe transfers today. .  Rec continued IP PT. If pt returns home, rec 24hr assist and increased home care services. Treatment Diagnosis: impaired functional mobility  Prognosis: Good  Decision Making: Medium Complexity  PT Education: PT Role;Functional Mobility Training  Patient Education: pt demos understanding; will benefit from reinforcement  REQUIRES PT FOLLOW UP: Yes       Patient Diagnosis(es): The encounter diagnosis was Need for follow-up by .      has a past medical history of BPH (benign prostatic hyperplasia), Carotid stenosis, Cellulitis, Chronic back pain, Diverticular disease, Erectile dysfunction, GERD (gastroesophageal reflux disease), History of atrial fibrillation, Hypercholesteremia, Hypertension, Lower GI bleed, MDRO (multiple drug resistant organisms) resistance, Neuromuscular disorder ([address] Renal insufficiency, Risk for falls, Tinea corporis, and Vitamin B12 deficiency. has a past surgical history that includes back surgery (2006); Foot surgery; Coronary artery bypass graft (12/13/2013); hip surgery (Right, 5/1/2015); Cystoscopy (N/A, 1/10/2019); Upper gastrointestinal endoscopy (N/A, 5/4/2020); and sigmoidoscopy (N/A, 6/11/2020). Restrictions  Position Activity Restriction  Other position/activity restrictions: no activity restrictions  Vision/Hearing  Vision: Within Functional Limits (eyes closed much of the time)  Hearing: Within functional limits     Subjective  General  Chart Reviewed: Yes  Additional Pertinent Hx: [de-identified] y.o.  male to ER 9/27 after recent d/c from [facility], pt unable to care for self at home; PMHx: HTN, chronic back pain, neuromuscular disorder, CABG, foot surgery, R hip surgery  Referring Practitioner: Sharifa Ardon MD  Diagnosis: weakness  Subjective  Subjective: Pt found supine on stretcher. Agreeable to PT. Keeps eyes closed most of the time - needs cues to open during mobility.   Pain Screening  Patient Currently in Pain: Yes (tooth pain, not rated, RN notified of pts request for pain meds)       Orientation  Orientation  Overall Orientation Status: Within Functional Limits  Social/Functional History  Social/Functional History  Lives With: Alone  Type of Home: House  Home Layout: One level  Home Access: Ramped entrance  Bathroom Shower/Tub:  (sponge bathes)  Bathroom Equipment: 3-in-1 commode (aides empty (1x/week))  Home Equipment: Wheelchair-manual, Lift chair, Reacher, Sock aid (sleeps in the lift chair, spends most of his day Treatment Recommendations: Strengthening, ROM, Balance Training, Functional Mobility Training, Transfer Training, Patient/Caregiver Education & Training  Safety Devices  Type of devices: Nurse notified, Call light within reach, Gait belt, Left in bed (pt left supine on stretcher as he was found - RN aware)    G-Code       OutComes Score                                                  AM-PAC Score  AM-PAC Inpatient Mobility Raw Score : 7 (09/27/21 1334)  AM-PAC Inpatient T-Scale Score : 26.42 (09/27/21 1334)  Mobility Inpatient CMS 0-100% Score: 92.36 (09/27/21 1334)  Mobility Inpatient CMS G-Code Modifier : CM (09/27/21 1334)          Goals  Short term goals  Time Frame for Short term goals: discharge  Short term goal 1: Pt will demo rolling in bed with min A x 1 to each side  Short term goal 2: Pt will transfer bed <--> chair with CGA  Patient Goals   Patient goals : return home       Therapy Time   Individual Concurrent Group Co-treatment   Time In 0907         Time Out 1000         Minutes 53               Timed Code Treatment Minutes:  38    Total Treatment Minutes:  53    If patient is discharged prior to next treatment, this note will serve as the discharge summary.   Fernanda Esparza, PT, DPT  [id number]

## 2021-09-27 NOTE — ED NOTES
[facility] Emergency Department  EDObservation Admission Note   Emergency Physicians       Time Placed in ED Observation: DISPOSITION Ed Observation 09/27/2021 02:35:36 AM    Impression and Plan      In summary, Najma Faulkner is admitted by to the [facility] for social needs. Dr. Jose Daniel Anguiano is the CDU admission attending. This patient has been risk-stratified based on the available history, physical exam, and related study findings. Admission to observation status for further diagnosis/treatment/monitoring of social needs is warranted clinically. This extendedperiod of observation is specifically required to determine the need for hospitalization. We will observe the patient for the following endpoints:    - Case management consultation  - Home medications    When met, appropriate disposition will be arranged. Diagnostic Evaluation      Diagnostic studies and ED interventions germane to this period of clinical observation willinclude:    - N/A       Consultant(s)      None       Patient History      Najma Faulkner is a [de-identified] y.o. male who presented to the Emergency Department for evaluation of social needs. The acute evaluation included case management consultation.           Past Medical History  Past Medical History:   Diagnosis Date    BPH (benign prostatic hyperplasia)     Carotid stenosis 12/2013    JESU 96-83% stenosis; LICA 01-93% stenosis    Cellulitis 12/2013    LLE    Chronic back pain     Diverticular disease     Erectile dysfunction     GERD (gastroesophageal reflux disease)     History of atrial fibrillation     Hypercholesteremia     Hypertension     Lower GI bleed     MDRO (multiple drug resistant organisms) resistance 10/26/2019    urine    Neuromuscular disorder (HCC)     spasticity    Renal insufficiency     Risk for falls     uses walker    Tinea corporis 12/2013    LLE    Vitamin B12 deficiency      Past Surgical History:   Procedure Laterality Date    BACK SURGERY  2006    lower lumbar    CORONARY ARTERY BYPASS GRAFT  2013    CABG x 5 (Dr Pastora Edmonds), svg to diag, om1 and 3, distal rca, kelly to lad.  CYSTOSCOPY N/A 1/10/2019    CYSTOSCOPY performed by Amee Michel MD at [address] Right 2015    ORIF    SIGMOIDOSCOPY N/A 2020    SIGMOIDOSCOPY DIAGNOSTIC FLEXIBLE performed by Ramírez Vyas MD at [address] [place] [address] N/A 2020    EGD BIOPSY performed by Ramírez Vyas MD at [facility] ENDOSCOPY     Family History   Problem Relation Age of Onset    Heart Disease Father      Social History     Tobacco Use    Smoking status: Former Smoker     Quit date: 1969     Years since quittin.8    Smokeless tobacco: Never Used   Vaping Use    Vaping Use: Never used   Substance Use Topics    Alcohol use: No    Drug use: No        Medications      Previous Medications    AMLODIPINE (NORVASC) 5 MG TABLET    Take 1 tablet by mouth daily    ASPIRIN 81 MG CHEWABLE TABLET    Take 1 tablet by mouth daily    ATORVASTATIN (LIPITOR) 80 MG TABLET    Take 80 mg by mouth nightly. BALSAM PERU-CASTOR OIL (VENELEX) OINT OINTMENT    Apply topically daily as needed    DICYCLOMINE (BENTYL) 20 MG TABLET    Take 20 mg by mouth 3 times daily as needed    DOCUSATE SODIUM (COLACE) 100 MG CAPSULE    Take 100 mg by mouth daily as needed for Constipation    DOCUSATE SODIUM (COLACE) 100 MG CAPSULE    Take 1 capsule by mouth daily as needed for Constipation    FUROSEMIDE (LASIX) 40 MG TABLET    TAKE 1 TABLET TWICE DAILY    GABAPENTIN (NEURONTIN) 600 MG TABLET    Take 600 mg by mouth 2 times daily.     MELATONIN 10 MG TABS    Take 10 mg by mouth nightly as needed     PANTOPRAZOLE (PROTONIX) 40 MG TABLET    Take 1 tablet by mouth every morning (before breakfast)    POLYETHYLENE GLYCOL (GLYCOLAX) 17 GM/SCOOP POWDER    Take 17 g by mouth daily    TAMSULOSIN (FLOMAX) 0.4 MG CAPSULE    Take 0.8 mg by mouth daily     VITAMIN B-12 (CYANOCOBALAMIN) 1000 MCG TABLET    Take 1,000 mcg by mouth daily    ZOLPIDEM (AMBIEN) 10 MG TABLET    Take 10 mg by mouth nightly as needed for Sleep. Review of Systems      Review of Systems   Constitutional: Negative for fever. Gastrointestinal: Positive for constipation. Negative for diarrhea, nausea and vomiting. All other systems reviewed and are negative. Physical Examination      Physical Exam  Elderly male, alert, NAD, respirations unlabored, heart RRR, BLE ace wraps in place.      Javy Cui MD  09/27/21 3776

## 2021-09-27 NOTE — ED PROVIDER NOTES
ED Attending Attestation Note     Date of evaluation: 9/27/2021    This patient was seen by the resident. I have seen and examined the patient, agree with the workup, evaluation, management and diagnosis. The care plan has been discussed. I was present for any procedures performed in the resident's  note and have made edits to the note where appropriate. My assessment reveals [de-identified] y.o. male complex past medical and social history presenting to emergency department today for poor living conditions. Was recently discharged from [facility] ED for constipation after a thorough work-up. Had a large bowel movement when returning home by ambulance and the EMS crew was very concerned about his living conditions with no one there to help clean him. Here he is alert, in no distress, respirations unlabored. Social work was consulted earlier today and have helped arrange home care services for tomorrow, however he is unable to care for himself for the time being. Patient placed in ED obs for case management to arnoldo in the morning.           Lela Garland MD  09/27/21 6970

## 2021-10-31 ENCOUNTER — HOSPITAL ENCOUNTER (INPATIENT)
Age: 81
LOS: 4 days | Discharge: NURSING, REHAB OR LONG-TERM CARE | DRG: 577 | End: 2021-11-04
Attending: STUDENT IN AN ORGANIZED HEALTH CARE EDUCATION/TRAINING PROGRAM | Admitting: INTERNAL MEDICINE
Payer: MEDICARE

## 2021-10-31 DIAGNOSIS — M79.604 PAIN OF RIGHT LOWER EXTREMITY: Primary | ICD-10-CM

## 2021-10-31 DIAGNOSIS — S81.819A: ICD-10-CM

## 2021-10-31 DIAGNOSIS — T14.8XXA DEGLOVING INJURY: ICD-10-CM

## 2021-10-31 DIAGNOSIS — Z87.448 HISTORY OF NEUROGENIC BLADDER: ICD-10-CM

## 2021-10-31 LAB
ANION GAP SERPL CALCULATED.3IONS-SCNC: 12 MMOL/L (ref 3–16)
APTT: 42.3 SEC (ref 26.2–38.6)
BASOPHILS ABSOLUTE: 0 K/UL (ref 0–0.2)
BASOPHILS RELATIVE PERCENT: 0.3 %
BUN BLDV-MCNC: 42 MG/DL (ref 7–20)
CALCIUM SERPL-MCNC: 9.1 MG/DL (ref 8.3–10.6)
CHLORIDE BLD-SCNC: 102 MMOL/L (ref 99–110)
CO2: 25 MMOL/L (ref 21–32)
CREAT SERPL-MCNC: 1.3 MG/DL (ref 0.8–1.3)
EOSINOPHILS ABSOLUTE: 0.3 K/UL (ref 0–0.6)
EOSINOPHILS RELATIVE PERCENT: 2.9 %
GFR AFRICAN AMERICAN: >60
GFR NON-AFRICAN AMERICAN: 53
GLUCOSE BLD-MCNC: 132 MG/DL (ref 70–99)
HCT VFR BLD CALC: 32.1 % (ref 40.5–52.5)
HEMOGLOBIN: 10.3 G/DL (ref 13.5–17.5)
INR BLD: 0.97 (ref 0.88–1.12)
LYMPHOCYTES ABSOLUTE: 1 K/UL (ref 1–5.1)
LYMPHOCYTES RELATIVE PERCENT: 8.4 %
MCH RBC QN AUTO: 30.3 PG (ref 26–34)
MCHC RBC AUTO-ENTMCNC: 32 G/DL (ref 31–36)
MCV RBC AUTO: 94.9 FL (ref 80–100)
MONOCYTES ABSOLUTE: 0.5 K/UL (ref 0–1.3)
MONOCYTES RELATIVE PERCENT: 4.4 %
NEUTROPHILS ABSOLUTE: 9.5 K/UL (ref 1.7–7.7)
NEUTROPHILS RELATIVE PERCENT: 84 %
PDW BLD-RTO: 17.8 % (ref 12.4–15.4)
PLATELET # BLD: 117 K/UL (ref 135–450)
PMV BLD AUTO: 9.5 FL (ref 5–10.5)
POTASSIUM REFLEX MAGNESIUM: 3.9 MMOL/L (ref 3.5–5.1)
PROTHROMBIN TIME: 11 SEC (ref 9.9–12.7)
RBC # BLD: 3.38 M/UL (ref 4.2–5.9)
SODIUM BLD-SCNC: 139 MMOL/L (ref 136–145)
WBC # BLD: 11.4 K/UL (ref 4–11)

## 2021-10-31 PROCEDURE — 36415 COLL VENOUS BLD VENIPUNCTURE: CPT

## 2021-10-31 PROCEDURE — 99221 1ST HOSP IP/OBS SF/LOW 40: CPT | Performed by: SURGERY

## 2021-10-31 PROCEDURE — 6370000000 HC RX 637 (ALT 250 FOR IP): Performed by: STUDENT IN AN ORGANIZED HEALTH CARE EDUCATION/TRAINING PROGRAM

## 2021-10-31 PROCEDURE — 85025 COMPLETE CBC W/AUTO DIFF WBC: CPT

## 2021-10-31 PROCEDURE — 99284 EMERGENCY DEPT VISIT MOD MDM: CPT

## 2021-10-31 PROCEDURE — 85730 THROMBOPLASTIN TIME PARTIAL: CPT

## 2021-10-31 PROCEDURE — 1200000000 HC SEMI PRIVATE

## 2021-10-31 PROCEDURE — 6370000000 HC RX 637 (ALT 250 FOR IP): Performed by: INTERNAL MEDICINE

## 2021-10-31 PROCEDURE — 85610 PROTHROMBIN TIME: CPT

## 2021-10-31 PROCEDURE — 2500000003 HC RX 250 WO HCPCS: Performed by: STUDENT IN AN ORGANIZED HEALTH CARE EDUCATION/TRAINING PROGRAM

## 2021-10-31 PROCEDURE — 2580000003 HC RX 258: Performed by: INTERNAL MEDICINE

## 2021-10-31 PROCEDURE — 80048 BASIC METABOLIC PNL TOTAL CA: CPT

## 2021-10-31 PROCEDURE — 93005 ELECTROCARDIOGRAM TRACING: CPT | Performed by: STUDENT IN AN ORGANIZED HEALTH CARE EDUCATION/TRAINING PROGRAM

## 2021-10-31 RX ORDER — ONDANSETRON 4 MG/1
4 TABLET, ORALLY DISINTEGRATING ORAL EVERY 8 HOURS PRN
Status: DISCONTINUED | OUTPATIENT
Start: 2021-10-31 | End: 2021-11-04 | Stop reason: HOSPADM

## 2021-10-31 RX ORDER — SODIUM CHLORIDE 9 MG/ML
25 INJECTION, SOLUTION INTRAVENOUS PRN
Status: DISCONTINUED | OUTPATIENT
Start: 2021-10-31 | End: 2021-11-04 | Stop reason: HOSPADM

## 2021-10-31 RX ORDER — SODIUM CHLORIDE 0.9 % (FLUSH) 0.9 %
5-40 SYRINGE (ML) INJECTION EVERY 12 HOURS SCHEDULED
Status: DISCONTINUED | OUTPATIENT
Start: 2021-10-31 | End: 2021-11-04 | Stop reason: HOSPADM

## 2021-10-31 RX ORDER — ACETAMINOPHEN 325 MG/1
650 TABLET ORAL EVERY 6 HOURS PRN
Status: DISCONTINUED | OUTPATIENT
Start: 2021-10-31 | End: 2021-11-04 | Stop reason: HOSPADM

## 2021-10-31 RX ORDER — ATORVASTATIN CALCIUM 80 MG/1
80 TABLET, FILM COATED ORAL NIGHTLY
Status: DISCONTINUED | OUTPATIENT
Start: 2021-10-31 | End: 2021-11-04 | Stop reason: HOSPADM

## 2021-10-31 RX ORDER — ACETAMINOPHEN 650 MG/1
650 SUPPOSITORY RECTAL EVERY 6 HOURS PRN
Status: DISCONTINUED | OUTPATIENT
Start: 2021-10-31 | End: 2021-11-04 | Stop reason: HOSPADM

## 2021-10-31 RX ORDER — LIDOCAINE HYDROCHLORIDE AND EPINEPHRINE 10; 10 MG/ML; UG/ML
20 INJECTION, SOLUTION INFILTRATION; PERINEURAL ONCE
Status: COMPLETED | OUTPATIENT
Start: 2021-10-31 | End: 2021-10-31

## 2021-10-31 RX ORDER — AMLODIPINE BESYLATE 5 MG/1
5 TABLET ORAL DAILY
Status: DISCONTINUED | OUTPATIENT
Start: 2021-11-01 | End: 2021-11-04 | Stop reason: HOSPADM

## 2021-10-31 RX ORDER — MECOBALAMIN 5000 MCG
10 TABLET,DISINTEGRATING ORAL NIGHTLY PRN
Status: DISCONTINUED | OUTPATIENT
Start: 2021-10-31 | End: 2021-11-04 | Stop reason: HOSPADM

## 2021-10-31 RX ORDER — SODIUM CHLORIDE 0.9 % (FLUSH) 0.9 %
5-40 SYRINGE (ML) INJECTION PRN
Status: DISCONTINUED | OUTPATIENT
Start: 2021-10-31 | End: 2021-11-04 | Stop reason: HOSPADM

## 2021-10-31 RX ORDER — POLYETHYLENE GLYCOL 3350 17 G/17G
17 POWDER, FOR SOLUTION ORAL DAILY PRN
Status: DISCONTINUED | OUTPATIENT
Start: 2021-10-31 | End: 2021-11-04 | Stop reason: HOSPADM

## 2021-10-31 RX ORDER — OXYCODONE HYDROCHLORIDE 5 MG/1
5 TABLET ORAL EVERY 6 HOURS PRN
Status: DISCONTINUED | OUTPATIENT
Start: 2021-10-31 | End: 2021-11-04 | Stop reason: HOSPADM

## 2021-10-31 RX ORDER — ONDANSETRON 2 MG/ML
4 INJECTION INTRAMUSCULAR; INTRAVENOUS EVERY 6 HOURS PRN
Status: DISCONTINUED | OUTPATIENT
Start: 2021-10-31 | End: 2021-11-04 | Stop reason: HOSPADM

## 2021-10-31 RX ADMIN — Medication 10 EACH: at 20:26

## 2021-10-31 RX ADMIN — SODIUM CHLORIDE, PRESERVATIVE FREE 10 ML: 5 INJECTION INTRAVENOUS at 23:49

## 2021-10-31 RX ADMIN — LIDOCAINE HYDROCHLORIDE,EPINEPHRINE BITARTRATE 20 ML: 10; .01 INJECTION, SOLUTION INFILTRATION; PERINEURAL at 20:26

## 2021-10-31 RX ADMIN — ATORVASTATIN CALCIUM 80 MG: 80 TABLET, FILM COATED ORAL at 23:49

## 2021-10-31 ASSESSMENT — PAIN SCALES - GENERAL
PAINLEVEL_OUTOF10: 0
PAINLEVEL_OUTOF10: 5

## 2021-10-31 NOTE — ED TRIAGE NOTES
Pt came to ED for Rt Leg Laceration. Pt hit leg on lift chair. Pt had Urinary catheter come out in transportation.

## 2021-10-31 NOTE — ED NOTES
Bed: A05-05  Expected date:   Expected time:   Means of arrival:   Comments:  [address], RN  10/31/21 1225

## 2021-10-31 NOTE — ED PROVIDER NOTES
[facility]          ATTENDING PHYSICIAN NOTE       Date of evaluation: 10/31/2021    Chief Complaint     Laceration (Rt leg)      History of Present Illness     Chet Cortes is a [de-identified] y.o. male who presents chief complaint of a laceration over his right lower extremity. Patient is baseline minimally nonambulatory with a complex past medical history. States he was mobilizing from his wheelchair to help out with a friend, he hit his right ankle on the wheelchair, noticed a cut and presented to the ED. Is continued oozing. Patient is on aspirin but denies any other anticoagulation use. No history of multiple infections with subsequent bacterial assistance. Chronically catheter dependent. He is unsure makes symptoms better or worse, does complain of stinging pain to that area. Review of Systems     REVIEW OF SYSTEMS  GENERAL: Negative for any fevers, chills, or weight loss. HEENT: Negative for any head trauma, neck trauma, neck stiffness, photophobia, phonophobia, sinusitis, rhinitis. CARDIAC: Negative for any chest pain, palpitations  PULMONARY: Negative for any shortness of breath, cough, wheezing  GASTROINTESTINAL: Negative for any abdominal pain, nausea, vomiting,   GENITOURINARY: Negative for any dysuria, hematuria, incontinence. SKIN: Per HPI  MSK: Negative for any joint pains, photosensitive rashes, history of vasculitis or kidney problems. HEMATOLOGIC: Negative for any abnormal bruising, frequent infections or bleeding.   NEUROLOGIC: Negative for any headache, dizziness, focal weakness  PSYCH: Negative for any agitation, confusion      Past Medical, Surgical, Family, and Social History     He has a past medical history of BPH (benign prostatic hyperplasia), Carotid stenosis, Cellulitis, Chronic back pain, Diverticular disease, Erectile dysfunction, GERD (gastroesophageal reflux disease), History of atrial fibrillation, Hypercholesteremia, Hypertension, Lower GI bleed, MDRO (multiple drug resistant organisms) resistance, Neuromuscular disorder ([address] Renal insufficiency, Risk for falls, Tinea corporis, and Vitamin B12 deficiency. He has a past surgical history that includes back surgery (2006); Foot surgery; Coronary artery bypass graft (12/13/2013); hip surgery (Right, 5/1/2015); Cystoscopy (N/A, 1/10/2019); Upper gastrointestinal endoscopy (N/A, 5/4/2020); and sigmoidoscopy (N/A, 6/11/2020). His family history includes Heart Disease in his father. He reports that he quit smoking about 51 years ago. He has never used smokeless tobacco. He reports that he does not drink alcohol and does not use drugs. Medications     Previous Medications    AMLODIPINE (NORVASC) 5 MG TABLET    Take 1 tablet by mouth daily    ASPIRIN 81 MG CHEWABLE TABLET    Take 1 tablet by mouth daily    ATORVASTATIN (LIPITOR) 80 MG TABLET    Take 80 mg by mouth nightly. BALSAM PERU-CASTOR OIL (VENELEX) OINT OINTMENT    Apply topically daily as needed    DICYCLOMINE (BENTYL) 20 MG TABLET    Take 20 mg by mouth 3 times daily as needed    DOCUSATE SODIUM (COLACE) 100 MG CAPSULE    Take 100 mg by mouth daily as needed for Constipation    DOCUSATE SODIUM (COLACE) 100 MG CAPSULE    Take 1 capsule by mouth daily as needed for Constipation    FUROSEMIDE (LASIX) 40 MG TABLET    TAKE 1 TABLET TWICE DAILY    GABAPENTIN (NEURONTIN) 600 MG TABLET    Take 600 mg by mouth 2 times daily. MELATONIN 10 MG TABS    Take 10 mg by mouth nightly as needed     PANTOPRAZOLE (PROTONIX) 40 MG TABLET    Take 1 tablet by mouth every morning (before breakfast)    TAMSULOSIN (FLOMAX) 0.4 MG CAPSULE    Take 0.8 mg by mouth daily     VITAMIN B-12 (CYANOCOBALAMIN) 1000 MCG TABLET    Take 1,000 mcg by mouth daily    ZOLPIDEM (AMBIEN) 10 MG TABLET    Take 10 mg by mouth nightly as needed for Sleep. Allergies     He is allergic to bactrim [sulfamethoxazole-trimethoprim].     Physical Exam     INITIAL VITALS: BP: 105/62, Monocytes Absolute 0.5 0.0 - 1.3 K/uL    Eosinophils Absolute 0.3 0.0 - 0.6 K/uL    Basophils Absolute 0.0 0.0 - 0.2 K/uL   Basic Metabolic Panel w/ Reflex to MG   Result Value Ref Range    Sodium 139 136 - 145 mmol/L    Potassium reflex Magnesium 3.9 3.5 - 5.1 mmol/L    Chloride 102 99 - 110 mmol/L    CO2 25 21 - 32 mmol/L    Anion Gap 12 3 - 16    Glucose 132 (H) 70 - 99 mg/dL    BUN 42 (H) 7 - 20 mg/dL    CREATININE 1.3 0.8 - 1.3 mg/dL    GFR Non- 53 (A) >60    GFR African American >60 >60    Calcium 9.1 8.3 - 10.6 mg/dL       ED BEDSIDE ULTRASOUND:      RECENT VITALS:  BP: 105/62, , Pulse: 68, Resp: 16, SpO2: 95 %     Procedures         ED Course     Nursing Notes, Past Medical Hx, Past Surgical Hx, Social Hx,Allergies, and Family Hx were reviewed. patient was given the following medications:  No orders of the defined types were placed in this encounter. CONSULTS:  Oneil Belle CONSULT TO HOSPITALIST    MEDICAL DECISIONMAKING / ASSESSMENT / PLAN     Shaunna Mcdaniels is a [de-identified] y.o. male presenting with a chief complaint of a large laceration to his right lower extremity. Patient with multiple comorbidities including hypertension, chronic immobilization catheter dependent. Exam constraining large abrasion with some exposed subcu tissue, possibly exposed muscle belly. Small skin flap bottom, when placed over top of wound, does not appear to cover entire wound, extremely low likelihood of healing and there will be significant gaps. Due to large wound and likely needed grafting continue wound care discussed with general surgeon on-call, evaluated patient at bedside, wound managed per surgery team, due to significant comorbidities surgery recommends admission to medicine, will consult medicine for evaluation. Does not medicine on-call, will admit patient for evaluation and observation    Clinical Impression     1. Degloving injury    2. History of neurogenic bladder    3. Pain of right lower extremity        Disposition     PATIENT REFERRED TO:  No follow-up provider specified.     DISCHARGE MEDICATIONS:  New Prescriptions    No medications on file       DISPOSITION    Admitted     Bello Booth MD  10/31/21 3271

## 2021-11-01 ENCOUNTER — APPOINTMENT (OUTPATIENT)
Dept: GENERAL RADIOLOGY | Age: 81
DRG: 577 | End: 2021-11-01
Payer: MEDICARE

## 2021-11-01 ENCOUNTER — ANESTHESIA EVENT (OUTPATIENT)
Dept: OPERATING ROOM | Age: 81
DRG: 577 | End: 2021-11-01
Payer: MEDICARE

## 2021-11-01 ENCOUNTER — ANESTHESIA (OUTPATIENT)
Dept: OPERATING ROOM | Age: 81
DRG: 577 | End: 2021-11-01
Payer: MEDICARE

## 2021-11-01 LAB
AMORPHOUS: ABNORMAL /HPF
ANION GAP SERPL CALCULATED.3IONS-SCNC: 11 MMOL/L (ref 3–16)
BACTERIA: ABNORMAL /HPF
BASOPHILS ABSOLUTE: 0 K/UL (ref 0–0.2)
BASOPHILS RELATIVE PERCENT: 0.6 %
BILIRUBIN URINE: NEGATIVE
BLOOD, URINE: ABNORMAL
BUN BLDV-MCNC: 44 MG/DL (ref 7–20)
CALCIUM SERPL-MCNC: 8.6 MG/DL (ref 8.3–10.6)
CHLORIDE BLD-SCNC: 101 MMOL/L (ref 99–110)
CLARITY: ABNORMAL
CO2: 25 MMOL/L (ref 21–32)
COLOR: YELLOW
CREAT SERPL-MCNC: 1.4 MG/DL (ref 0.8–1.3)
CRYSTALS, UA: ABNORMAL /HPF
EKG ATRIAL RATE: 70 BPM
EKG DIAGNOSIS: NORMAL
EKG P AXIS: 67 DEGREES
EKG P-R INTERVAL: 178 MS
EKG Q-T INTERVAL: 418 MS
EKG QRS DURATION: 88 MS
EKG QTC CALCULATION (BAZETT): 451 MS
EKG R AXIS: 24 DEGREES
EKG T AXIS: 23 DEGREES
EKG VENTRICULAR RATE: 70 BPM
EOSINOPHILS ABSOLUTE: 0.3 K/UL (ref 0–0.6)
EOSINOPHILS RELATIVE PERCENT: 5.5 %
GFR AFRICAN AMERICAN: 59
GFR NON-AFRICAN AMERICAN: 49
GLUCOSE BLD-MCNC: 133 MG/DL (ref 70–99)
GLUCOSE URINE: NEGATIVE MG/DL
HCT VFR BLD CALC: 26 % (ref 40.5–52.5)
HEMOGLOBIN: 8.7 G/DL (ref 13.5–17.5)
KETONES, URINE: NEGATIVE MG/DL
LEUKOCYTE ESTERASE, URINE: ABNORMAL
LYMPHOCYTES ABSOLUTE: 1.1 K/UL (ref 1–5.1)
LYMPHOCYTES RELATIVE PERCENT: 18.6 %
MCH RBC QN AUTO: 31.7 PG (ref 26–34)
MCHC RBC AUTO-ENTMCNC: 33.4 G/DL (ref 31–36)
MCV RBC AUTO: 94.7 FL (ref 80–100)
MICROSCOPIC EXAMINATION: YES
MONOCYTES ABSOLUTE: 0.4 K/UL (ref 0–1.3)
MONOCYTES RELATIVE PERCENT: 7.3 %
NEUTROPHILS ABSOLUTE: 4.1 K/UL (ref 1.7–7.7)
NEUTROPHILS RELATIVE PERCENT: 68 %
NITRITE, URINE: POSITIVE
PDW BLD-RTO: 17.9 % (ref 12.4–15.4)
PH UA: 8.5 (ref 5–8)
PLATELET # BLD: 102 K/UL (ref 135–450)
PMV BLD AUTO: 9.2 FL (ref 5–10.5)
POTASSIUM REFLEX MAGNESIUM: 4.3 MMOL/L (ref 3.5–5.1)
PROTEIN UA: NEGATIVE MG/DL
RBC # BLD: 2.74 M/UL (ref 4.2–5.9)
RBC UA: ABNORMAL /HPF (ref 0–4)
SODIUM BLD-SCNC: 137 MMOL/L (ref 136–145)
SPECIFIC GRAVITY UA: 1.01 (ref 1–1.03)
URINE REFLEX TO CULTURE: ABNORMAL
URINE TYPE: ABNORMAL
UROBILINOGEN, URINE: 0.2 E.U./DL
WBC # BLD: 6 K/UL (ref 4–11)
WBC UA: ABNORMAL /HPF (ref 0–5)

## 2021-11-01 PROCEDURE — 6370000000 HC RX 637 (ALT 250 FOR IP): Performed by: INTERNAL MEDICINE

## 2021-11-01 PROCEDURE — 99231 SBSQ HOSP IP/OBS SF/LOW 25: CPT | Performed by: SURGERY

## 2021-11-01 PROCEDURE — 2580000003 HC RX 258: Performed by: INTERNAL MEDICINE

## 2021-11-01 PROCEDURE — 85025 COMPLETE CBC W/AUTO DIFF WBC: CPT

## 2021-11-01 PROCEDURE — 99222 1ST HOSP IP/OBS MODERATE 55: CPT | Performed by: SURGERY

## 2021-11-01 PROCEDURE — 1200000000 HC SEMI PRIVATE

## 2021-11-01 PROCEDURE — 2580000003 HC RX 258: Performed by: STUDENT IN AN ORGANIZED HEALTH CARE EDUCATION/TRAINING PROGRAM

## 2021-11-01 PROCEDURE — 6360000002 HC RX W HCPCS: Performed by: INTERNAL MEDICINE

## 2021-11-01 PROCEDURE — 80048 BASIC METABOLIC PNL TOTAL CA: CPT

## 2021-11-01 PROCEDURE — 71045 X-RAY EXAM CHEST 1 VIEW: CPT

## 2021-11-01 PROCEDURE — 81001 URINALYSIS AUTO W/SCOPE: CPT

## 2021-11-01 PROCEDURE — 36415 COLL VENOUS BLD VENIPUNCTURE: CPT

## 2021-11-01 RX ORDER — SODIUM CHLORIDE, SODIUM LACTATE, POTASSIUM CHLORIDE, CALCIUM CHLORIDE 600; 310; 30; 20 MG/100ML; MG/100ML; MG/100ML; MG/100ML
INJECTION, SOLUTION INTRAVENOUS CONTINUOUS
Status: DISCONTINUED | OUTPATIENT
Start: 2021-11-01 | End: 2021-11-02

## 2021-11-01 RX ADMIN — Medication 10 MG: at 22:32

## 2021-11-01 RX ADMIN — SODIUM CHLORIDE, POTASSIUM CHLORIDE, SODIUM LACTATE AND CALCIUM CHLORIDE: 600; 310; 30; 20 INJECTION, SOLUTION INTRAVENOUS at 08:47

## 2021-11-01 RX ADMIN — OXYCODONE 5 MG: 5 TABLET ORAL at 22:32

## 2021-11-01 RX ADMIN — SODIUM CHLORIDE, PRESERVATIVE FREE 10 ML: 5 INJECTION INTRAVENOUS at 08:48

## 2021-11-01 RX ADMIN — ATORVASTATIN CALCIUM 80 MG: 80 TABLET, FILM COATED ORAL at 21:58

## 2021-11-01 RX ADMIN — ENOXAPARIN SODIUM 40 MG: 100 INJECTION SUBCUTANEOUS at 13:44

## 2021-11-01 RX ADMIN — AMLODIPINE BESYLATE 5 MG: 5 TABLET ORAL at 08:45

## 2021-11-01 RX ADMIN — OXYCODONE 5 MG: 5 TABLET ORAL at 16:23

## 2021-11-01 ASSESSMENT — PAIN SCALES - GENERAL
PAINLEVEL_OUTOF10: 7
PAINLEVEL_OUTOF10: 7
PAINLEVEL_OUTOF10: 0
PAINLEVEL_OUTOF10: 7
PAINLEVEL_OUTOF10: 0

## 2021-11-01 ASSESSMENT — PAIN DESCRIPTION - PAIN TYPE: TYPE: ACUTE PAIN

## 2021-11-01 ASSESSMENT — PAIN DESCRIPTION - LOCATION: LOCATION: BACK

## 2021-11-01 ASSESSMENT — PAIN DESCRIPTION - ORIENTATION: ORIENTATION: LOWER

## 2021-11-01 NOTE — PROGRESS NOTES
General Surgery   Daily Progress Note  Patient: Arvind Mendes      CC: RLE skin avulsion laceration    SUBJECTIVE:   Patient had a dressing change for oozing of blood overnight. He is doing well this morning. Having some minimal pain of his right shin. ROS:   A 14 point review of systems was conducted, significant findings as noted above. All other systems negative. OBJECTIVE:    PHYSICAL EXAM:    Vitals:    10/31/21 2100 10/31/21 2157 10/31/21 2305 11/01/21 0321   BP: 98/62 (!) 91/55 105/63 102/60   Pulse:  67 64 66   Resp:  16 16 16   Temp:  97.6 °F (36.4 °C) 98 °F (36.7 °C) 97.8 °F (36.6 °C)   TempSrc:  Oral Oral Oral   SpO2:  98% 95% 92%   Weight:       Height:           General appearance: alert, no acute distress, grooming appropriate  HEENT: Normocephalic, atraumatic; EOMI; moist mucous membranes  Neck: trachea midline, no JVD, no lymphadenopathy  Chest/Lungs: Normal inspiratory effort, symmetric chest rise, no accessory muscle use  Cardiovascular: Regular rate and rhythm; perfusing extremities  Abdomen: soft, non-tender, non-distended, no guarding/rigidity  Skin: warm and dry, no rashes  Extremities: no edema, no cyanosis  RLE: laceration to the shin, active bleeding in wound bed in the corners, overlying skin avulsed. Dressed with Xeroform, Kerlix and Coban           LABS:   Recent Labs     10/31/21  1931 11/01/21  0446   WBC 11.4* 6.0   HGB 10.3* 8.7*   HCT 32.1* 26.0*   MCV 94.9 94.7   * 102*        Recent Labs     10/31/21  1931 11/01/21  0446    137   K 3.9 4.3    101   CO2 25 25   BUN 42* 44*   CREATININE 1.3 1.4*      No results for input(s): AST, ALT, ALB, BILIDIR, BILITOT, ALKPHOS in the last 72 hours. No results for input(s): LIPASE, AMYLASE in the last 72 hours. Recent Labs     10/31/21  1931   INR 0.97   APTT 42.3*      No results for input(s): CKTOTAL, CKMB, CKMBINDEX, TROPONINI in the last 72 hours.       ASSESSMENT & PLAN:   Arvind Mendes is a [de-identified] y.o. male with the aforementioned history who presents for RLE laceration.    -Oozing controlled this morning at bedside and dressing place over RLE  - Will consult plastic surgery for skin graft of RLE    Leila Sears DO  PGY1, General Surgery  11/01/21  6:25 AM  [phone]

## 2021-11-01 NOTE — H&P
Hospital Medicine History & Physical      PCP: INES SANTIAGODON [address]    Date of Admission: 10/31/2021    Date of Service: Pt seen/examined on 10/31/2021 and Admitted to Inpatient with expected LOS greater than two midnights due to medical therapy. Chief Complaint: Right leg laceration-bleeding      History Of Present Illness:      [de-identified] y.o. male who presents with complaints of right leg pain, after sustaining a laceration to his right leg. Patient states that he is minimally ambulatory at baseline. Earlier today he has hit his right ankle on the wheelchair sustaining a large laceration to his leg. Since then patient noticed continuous oozing of blood from the cut. Patient is on 81 mg of aspirin. Patient has chronic indwelling Iglesias catheter which has got dislodged during transportation. Patient was seen by general surgery team and decision was made to admit to medicine given his complex medical history (hypertension, CAD, status post CABG x5, hyperlipidemia, CHF with preserved ejection fraction, neurogenic bladder requiring chronic indwelling Iglesias catheter). Patient will be evaluated by plastic surgery in a.m. regarding definitive management of the current laceration, which is in surgical dressing after cauterization and obtaining hemostasis by the surgical team in ED.     Past Medical History:          Diagnosis Date    BPH (benign prostatic hyperplasia)     Carotid stenosis 12/2013    JESU 44-50% stenosis; LICA 54-68% stenosis    Cellulitis 12/2013    LLE    Chronic back pain     Diverticular disease     Erectile dysfunction     GERD (gastroesophageal reflux disease)     History of atrial fibrillation     Hypercholesteremia     Hypertension     Lower GI bleed     MDRO (multiple drug resistant organisms) resistance 10/26/2019    urine    Neuromuscular disorder (HCC)     spasticity    Renal insufficiency     Risk for falls     uses walker    Tinea corporis 12/2013    LLE    Vitamin B12 deficiency        Past Surgical History:          Procedure Laterality Date    BACK SURGERY  2006    lower lumbar    CORONARY ARTERY BYPASS GRAFT  12/13/2013    CABG x 5 (Dr Brandt Ax), svg to diag, om1 and 3, distal rca, kelly to lad.  CYSTOSCOPY N/A 1/10/2019    CYSTOSCOPY performed by Zully Moore MD at [address] 5/1/2015    ORIF    SIGMOIDOSCOPY N/A 6/11/2020    SIGMOIDOSCOPY DIAGNOSTIC FLEXIBLE performed by Steph Pepe MD at [address] 5/4/2020    EGD BIOPSY performed by Steph Pepe MD at [facility] ENDOSCOPY       Medications Prior to Admission:      Prior to Admission medications    Medication Sig Start Date End Date Taking? Authorizing Provider   docusate sodium (COLACE) 100 MG capsule Take 1 capsule by mouth daily as needed for Constipation 9/26/21   Leverne Homans, MD   furosemide (LASIX) 40 MG tablet TAKE 1 TABLET TWICE DAILY 9/23/21   TIM Toussaint - CNP   Balsam Peru-Castor Oil (VENELEX) OINT ointment Apply topically daily as needed    Historical Provider, MD   docusate sodium (COLACE) 100 MG capsule Take 100 mg by mouth daily as needed for Constipation    Historical Provider, MD   gabapentin (NEURONTIN) 600 MG tablet Take 600 mg by mouth 2 times daily.  5/13/21   Historical Provider, MD   amLODIPine (NORVASC) 5 MG tablet Take 1 tablet by mouth daily 4/24/21   Samantha Calloway MD   dicyclomine (BENTYL) 20 MG tablet Take 20 mg by mouth 3 times daily as needed 12/3/20   Historical Provider, MD   vitamin B-12 (CYANOCOBALAMIN) 1000 MCG tablet Take 1,000 mcg by mouth daily    Historical Provider, MD   aspirin 81 MG chewable tablet Take 1 tablet by mouth daily 5/7/20   Kurt Crowe MD   pantoprazole (PROTONIX) 40 MG tablet Take 1 tablet by mouth every morning (before breakfast) 5/6/20   Kurt Crowe MD   tamsulosin (FLOMAX) 0.4 MG capsule Take 0.8 mg by mouth daily     Historical Provider, MD   Melatonin 10 MG TABS Take 10 mg by mouth nightly as needed     Historical Provider, MD   atorvastatin (LIPITOR) 80 MG tablet Take 80 mg by mouth nightly. Historical Provider, MD   zolpidem (AMBIEN) 10 MG tablet Take 10 mg by mouth nightly as needed for Sleep. Historical Provider, MD       Allergies:  Bactrim [sulfamethoxazole-trimethoprim]    Social History:    TOBACCO:   reports that he quit smoking about 51 years ago. He has never used smokeless tobacco.  ETOH:   reports no history of alcohol use. E-Cigarettes/Vaping Use     Questions Responses    E-Cigarette/Vaping Use Never User    Start Date     Passive Exposure     Quit Date     Counseling Given     Comments         Family History:    Reviewed in detail and negative for DM, CAD, Cancer, CVA. Positive as follows:        Problem Relation Age of Onset    Heart Disease Father        REVIEW OF SYSTEMS COMPLETED:   Pertinent positives as noted in the HPI. All other systems reviewed and negative. PHYSICAL EXAM PERFORMED:    BP 98/62   Pulse 68   Temp 98.3 °F (36.8 °C) (Oral)   Resp 16   Ht 5' 11\" (1.803 m)   Wt 194 lb (88 kg)   SpO2 95%   BMI 27.06 kg/m²     General appearance:  No apparent distress, appears stated age and cooperative. HEENT:  Normal cephalic, atraumatic without obvious deformity. Pupils equal, round, and reactive to light. Extra ocular muscles intact. Conjunctivae/corneas clear. Neck: Supple, with full range of motion. No jugular venous distention. Trachea midline. Respiratory:  Normal respiratory effort. Clear to auscultation, bilaterally without Rales/Wheezes/Rhonchi. Cardiovascular:  Regular rate and rhythm with normal S1/S2 without murmurs, rubs or gallops. Abdomen: Soft, non-tender, non-distended with normal bowel sounds. Musculoskeletal:  No clubbing, cyanosis. Bilateral leg edema. Full range of motion without deformity. Right leg in surgical dressing.   Has had large degloving type skin tear over the right thigh approximately 4 x 7 cm with small skin flap at the bottom with exposed subcutaneous fat, fascia and muscle per ER physician's documentation  Skin: Skin color, texture, turgor normal.  No rashes or lesions. Neurologic:  Neurovascularly intact without any focal sensory/motor deficits. Cranial nerves: II-XII intact, grossly non-focal.  Psychiatric:  Alert and oriented, thought content appropriate, normal insight  Capillary Refill: Brisk,3 seconds, normal  Peripheral Pulses: +2 palpable, equal bilaterally       Labs:     Recent Labs     10/31/21  1931   WBC 11.4*   HGB 10.3*   HCT 32.1*   *     Recent Labs     10/31/21  1931      K 3.9      CO2 25   BUN 42*   CREATININE 1.3   CALCIUM 9.1     No results for input(s): AST, ALT, BILIDIR, BILITOT, ALKPHOS in the last 72 hours. Recent Labs     10/31/21  1931   INR 0.97     No results for input(s): Wayna Khat in the last 72 hours. Urinalysis:      Lab Results   Component Value Date    NITRU POSITIVE 06/19/2021    WBCUA None seen 06/19/2021    BACTERIA 4+ 06/05/2021    RBCUA None seen 06/19/2021    BLOODU TRACE-INTACT 06/19/2021    SPECGRAV 1.010 06/19/2021    GLUCOSEU Negative 06/19/2021       Radiology:     CXR: I have reviewed the CXR with the following interpretation: N/A  EKG:  I have reviewed the EKG with the following interpretation: N/A      ASSESSMENT:    Active Hospital Problems    Diagnosis Date Noted    Leg laceration, unspecified laterality, initial encounter Marvine Alpers 10/31/2021   #Mechanical injury  #Wide gaping laceration to right leg/hip with exposed subcutaneous fat, fascia and muscle. General surgery had seen the patient and approximated the gaping wound as much as possible by ED physician.   Currently wound wrapped in surgical dressing  #Neurogenic bladder with chronic Iglesias catheter  #Thrombocytopenia-new  #Chronic medical conditions-essential hypertension hypertension, CAD, status post CABG x5, hyperlipidemia, CHF with preserved ejection fraction,    PLAN:  -Pain relief as ordered  -Will contact general surgery team if issues with continued bleeding from the right leg wound  -Continue current wound care per general surgery recommendations  -Replace Iglesias, check UA reflex to culture  -Monitor CBC for hemoglobin and platelet count  -Continue his home medications appropriately  -Hold aspirin  -DVT prophylaxis with SCDs  -Supportive therapy      DVT Prophylaxis: SCDs  Diet: ADULT DIET; Regular; 4 carb choices (60 gm/meal); Low Fat/Low Chol/High Fiber/2 gm Na  Code Status: Full Code    PT/OT Eval Status: Bedrest    Dispo -GMF with telemetry       Sarita Israel MD    Thank you INES Watts for the opportunity to be involved in this patient's care. If you have any questions or concerns please feel free to contact me at [phone].

## 2021-11-01 NOTE — CONSULTS
General Surgery   Resident Consult Note    Reason for Consult: RLE laceration    History of Present Illness:   Neel Dejesus is a [de-identified] y.o. male with history of CAD s/p CABG, HTN, CKD stage 3, neuromuscular spasticity, stage 2 sacral decubitus ulcers who presents for right lower extremity laceration. States he was mobilizing from his wheelchair and hit his right ankle on the wheelchair, tearing his skin. The patient states the cut has continued to ooze. He is on aspirin at home but denies use of any other blood thinners. Surgery is consulted for recommendations for wound closure. Past Medical History:        Diagnosis Date    BPH (benign prostatic hyperplasia)     Carotid stenosis 12/2013    JESU 92-18% stenosis; LICA 86-46% stenosis    Cellulitis 12/2013    LLE    Chronic back pain     Diverticular disease     Erectile dysfunction     GERD (gastroesophageal reflux disease)     History of atrial fibrillation     Hypercholesteremia     Hypertension     Lower GI bleed     MDRO (multiple drug resistant organisms) resistance 10/26/2019    urine    Neuromuscular disorder (HCC)     spasticity    Renal insufficiency     Risk for falls     uses walker    Tinea corporis 12/2013    LLE    Vitamin B12 deficiency        Past Surgical History:        Procedure Laterality Date    BACK SURGERY  2006    lower lumbar    CORONARY ARTERY BYPASS GRAFT  12/13/2013    CABG x 5 (Dr Jemima Polanco), svg to diag, om1 and 3, distal rca, kelly to lad.      CYSTOSCOPY N/A 1/10/2019    CYSTOSCOPY performed by Shirley Álvarez MD at [facility] [address] 5/1/2015    [facility]    SIGMOIDOSCOPY N/A 6/11/2020    SIGMOIDOSCOPY DIAGNOSTIC FLEXIBLE performed by Nohemi Parkinson MD at [facility] 5/4/2020    EGD BIOPSY performed by Nohemi Parkinson MD at [facility] ENDOSCOPY       Allergies:  Bactrim [sulfamethoxazole-trimethoprim]    Medications:   Home Meds  No Skin: RLE lac  Endocrine: Negative. Allergic/Immunologic: Negative. Neurological: Negative. Hematological: Negative. Psychiatric/Behavioral: Negative. Physical exam:    Vitals:    10/31/21 1855 10/31/21 1900   BP:  105/62   Pulse:  68   Resp:  16   SpO2:  95%   Weight: 194 lb (88 kg)    Height: 5' 11\" (1.803 m)        General appearance: alert, no acute distress, grooming appropriate  HEENT: Normocephalic, atraumatic; EOMI; moist mucous membranes  Neck: trachea midline, no JVD, no lymphadenopathy  Chest/Lungs: Normal inspiratory effort, symmetric chest rise, no accessory muscle use  Cardiovascular: Regular rate and rhythm; perfusing extremities  Abdomen: soft, non-tender, non-distended, no guarding/rigidity  Skin: warm and dry, no rashes  Extremities: no edema, no cyanosis  RLE: laceration to the shin, active bleeding in wound bed, overlying skin avulsed           Neuro: A&Ox3, no gross sensory or motor neuro deficits    Labs:    CBC:   Recent Labs     10/31/21  1931   WBC 11.4*   HGB 10.3*   HCT 32.1*   MCV 94.9   *     BMP:   Recent Labs     10/31/21  1931      K 3.9      CO2 25   BUN 42*   CREATININE 1.3     Liver Profile:   Lab Results   Component Value Date    AST 13 06/20/2021    ALT 9 06/20/2021    BILIDIR <0.2 06/20/2021    BILITOT 0.4 06/20/2021    ALKPHOS 65 06/20/2021    GGT 23 05/01/2015     Lab Results   Component Value Date    CHOL 102 06/05/2021    HDL 43 06/05/2021    TRIG 54 06/05/2021     PT/INR:   Recent Labs     10/31/21  1931   PROTIME 11.0   INR 0.97         Imaging:   No orders to display         Assessment/Plan:  Arvind Mendes is a [de-identified] y.o. male with the aforementioned history who presents for RLE laceration.    -Wound examined and copiously irrigated at bedside.   -Silver nitrate, lidocaine with epinephrine soaked gauze, pressure, and surgicel utilized to cauterize punctate bleeding and hemostasis achieved.   -Applied xeroform, gauze to the wound bed and wrapped the RLE with kerlix/coban  -Will plan for plastic surgery consult tomorrow for further evaluation for possible STSG.    -Patient discussed with Dr. Lolis Nguyen MD, PGY-1  10/31/21 9:15 PM  Pager: [phone]

## 2021-11-01 NOTE — PROGRESS NOTES
VSS. A&Ox4. Denies any pain or discomfort. Repositioned q2h. Pillow support needed. stage 2 on buttocks, applied barrier cream prn. IV infusing. NPO. RLE wrapped by surg. strike through noted and reinforced by surg team. Door open. Call light within reach. Chronic schneider with adequate UO.  Will cont to monitor

## 2021-11-01 NOTE — PROGRESS NOTES
PRE-OP NOTE  Department of Surgery  Resident Note     Procedure: RLE wound debridement, STSG    Consent: will obtain on hard chart    Labs    Recent Labs     10/31/21  1931 11/01/21  0446   WBC 11.4* 6.0   HGB 10.3* 8.7*   HCT 32.1* 26.0*   MCV 94.9 94.7   * 102*        Recent Labs     10/31/21  1931 11/01/21  0446    137   K 3.9 4.3    101   CO2 25 25   BUN 42* 44*   CREATININE 1.3 1.4*      No results for input(s): AST, ALT, ALB, BILIDIR, BILITOT, ALKPHOS in the last 72 hours. No results for input(s): LIPASE, AMYLASE in the last 72 hours. Recent Labs     10/31/21  1931   INR 0.97   APTT 42.3*      No results for input(s): CKTOTAL, CKMB, CKMBINDEX, TROPONINI in the last 72 hours. CXR: 9/27 - needs repeat  EKG: 10/31      Orders:   1. Diet: NPO now,  IVF - LR 50  2. Pre op Medications:  (abxs)  Ancef OCTOR  3. Labs to be drawn: N/A  4.  Anesthesia to see patient       Alok Wilkinson MD,  11/1/2021,  8:07 AM

## 2021-11-01 NOTE — CONSULTS
Plastic Surgery   Resident Consult Note    Reason for Consult: RLE laceration    History of Present Illness:   Michaela Bullock is a [de-identified] y.o. male with history of CAD s/p CABG, HTN, CKD stage 3, neuromuscular spasticity, stage 2 sacral decubitus ulcers who presents for right lower extremity laceration. States he was mobilizing from his wheelchair and hit his right ankle on the wheelchair, tearing his skin. The patient states the cut has continued to ooze. He is on aspirin at home but denies use of any other blood thinners. Plastic Surgery is consulted for recommendations for wound closure. Past Medical History:        Diagnosis Date    BPH (benign prostatic hyperplasia)     Carotid stenosis 12/2013    JESU 28-54% stenosis; LICA 57-59% stenosis    Cellulitis 12/2013    LLE    Chronic back pain     Diverticular disease     Erectile dysfunction     GERD (gastroesophageal reflux disease)     History of atrial fibrillation     Hypercholesteremia     Hypertension     Lower GI bleed     MDRO (multiple drug resistant organisms) resistance 10/26/2019    urine    Neuromuscular disorder (HCC)     spasticity    Renal insufficiency     Risk for falls     uses walker    Tinea corporis 12/2013    LLE    Vitamin B12 deficiency        Past Surgical History:        Procedure Laterality Date    BACK SURGERY  2006    lower lumbar    CORONARY ARTERY BYPASS GRAFT  12/13/2013    CABG x 5 (Dr Yinka Nogueira), svg to diag, om1 and 3, distal rca, kelly to lad.      CYSTOSCOPY N/A 1/10/2019    CYSTOSCOPY performed by Kwabena Hamilton MD at [facility] [address] 5/1/2015    [facility]    SIGMOIDOSCOPY N/A 6/11/2020    SIGMOIDOSCOPY DIAGNOSTIC FLEXIBLE performed by Frannie Ron MD at [address] [place] [address] 5/4/2020    EGD BIOPSY performed by Frannie Ron MD at [facility] ENDOSCOPY       Allergies:  Bactrim [sulfamethoxazole-trimethoprim]    Medications:   Home Meds  No current facility-administered medications on file prior to encounter. Current Outpatient Medications on File Prior to Encounter   Medication Sig Dispense Refill    furosemide (LASIX) 40 MG tablet TAKE 1 TABLET TWICE DAILY 180 tablet 3    gabapentin (NEURONTIN) 600 MG tablet Take 600 mg by mouth 2 times daily.  amLODIPine (NORVASC) 5 MG tablet Take 1 tablet by mouth daily 30 tablet 3    vitamin B-12 (CYANOCOBALAMIN) 1000 MCG tablet Take 1,000 mcg by mouth daily      aspirin 81 MG chewable tablet Take 1 tablet by mouth daily 30 tablet 3    pantoprazole (PROTONIX) 40 MG tablet Take 1 tablet by mouth every morning (before breakfast) 30 tablet 3    tamsulosin (FLOMAX) 0.4 MG capsule Take 0.8 mg by mouth daily       Melatonin 10 MG TABS Take 10 mg by mouth nightly as needed       atorvastatin (LIPITOR) 80 MG tablet Take 80 mg by mouth nightly.  docusate sodium (COLACE) 100 MG capsule Take 1 capsule by mouth daily as needed for Constipation 30 capsule 0    Balsam Peru-Castor Oil (VENELEX) OINT ointment Apply topically daily as needed      docusate sodium (COLACE) 100 MG capsule Take 100 mg by mouth daily as needed for Constipation      dicyclomine (BENTYL) 20 MG tablet Take 20 mg by mouth 3 times daily as needed      zolpidem (AMBIEN) 10 MG tablet Take 10 mg by mouth nightly as needed for Sleep.           Current Meds  lactated ringers infusion, Continuous  amLODIPine (NORVASC) tablet 5 mg, Daily  atorvastatin (LIPITOR) tablet 80 mg, Nightly  melatonin disintegrating tablet 10 mg, Nightly PRN  sodium chloride flush 0.9 % injection 5-40 mL, 2 times per day  sodium chloride flush 0.9 % injection 5-40 mL, PRN  0.9 % sodium chloride infusion, PRN  ondansetron (ZOFRAN-ODT) disintegrating tablet 4 mg, Q8H PRN   Or  ondansetron (ZOFRAN) injection 4 mg, Q6H PRN  polyethylene glycol (GLYCOLAX) packet 17 g, Daily PRN  acetaminophen (TYLENOL) tablet 650 mg, Q6H PRN   Or  acetaminophen (TYLENOL) suppository 650 mg, Q6H PRN  oxyCODONE (ROXICODONE) immediate release tablet 5 mg, Q6H PRN  influenza quadrivalent split vaccine (FLUZONE;FLUARIX;FLULAVAL;AFLURIA) injection 0.5 mL, Prior to discharge        Family History:   Family History   Problem Relation Age of Onset    Heart Disease Father        Social History:   TOBACCO:   reports that he quit smoking about 51 years ago. He has never used smokeless tobacco.  ETOH:   reports no history of alcohol use. DRUGS:   reports no history of drug use. Review of Systems:     Constitutional: Negative. HENT: Negative. Eyes: Negative. Respiratory: Negative. Cardiovascular: Negative. Gastrointestinal: Negative. Genitourinary: Negative. Musculoskeletal: Negative. Skin: RLE lac  Endocrine: Negative. Allergic/Immunologic: Negative. Neurological: Negative. Hematological: Negative. Psychiatric/Behavioral: Negative.     Physical exam:    Vitals:    10/31/21 2157 10/31/21 2305 11/01/21 0321 11/01/21 0742   BP: (!) 91/55 105/63 102/60 108/64   Pulse: 67 64 66 73   Resp: 16 16 16 17   Temp: 97.6 °F (36.4 °C) 98 °F (36.7 °C) 97.8 °F (36.6 °C) 98 °F (36.7 °C)   TempSrc: Oral Oral Oral Oral   SpO2: 98% 95% 92% 95%   Weight:       Height:           General appearance: alert, no acute distress, grooming appropriate  HEENT: Normocephalic, atraumatic; EOMI; moist mucous membranes  Neck: trachea midline, no JVD, no lymphadenopathy  Chest/Lungs: Normal inspiratory effort, symmetric chest rise, no accessory muscle use  Cardiovascular: Regular rate and rhythm; perfusing extremities  Abdomen: soft, non-tender, non-distended, no guarding/rigidity  Skin: warm and dry, no rashes  Extremities: no edema, no cyanosis  RLE: laceration to the shin, two small areas of oozing, overlying skin avulsed   Neuro: A&Ox3, no gross sensory or motor neuro deficits    Labs:    CBC:   Recent Labs     10/31/21  1931 11/01/21  0446   WBC 11.4* 6.0   HGB 10.3* 8.7*   HCT 32.1* 26.0*   MCV 94.9 94.7 * 102*     BMP:   Recent Labs     10/31/21  1931 11/01/21  0446    137   K 3.9 4.3    101   CO2 25 25   BUN 42* 44*   CREATININE 1.3 1.4*     Liver Profile:   Lab Results   Component Value Date    AST 13 06/20/2021    ALT 9 06/20/2021    BILIDIR <0.2 06/20/2021    BILITOT 0.4 06/20/2021    ALKPHOS 65 06/20/2021    GGT 23 05/01/2015     Lab Results   Component Value Date    CHOL 102 06/05/2021    HDL 43 06/05/2021    TRIG 54 06/05/2021     PT/INR:   Recent Labs     10/31/21  1931   PROTIME 11.0   INR 0.97         Imaging:   No orders to display         Assessment/Plan:  Phil Yuen is a [de-identified] y.o. male with the aforementioned history who presents for RLE laceration. - plan for OR this afternoon for possible further debridement and STSG this afternoon  - preop ordered  - will get consent  - Ancef OCTOR  - NPO  - discussed with DR. Leighann Syed who agrees with plan    Samaria Back MD   Gen Surg PGY 4  11/1/2021  8:15 AM  [phone]    I saw and independently examined the patient today. I agree with the history of present illness, past medical/surgical histories, family history, social history, medication list and allergies as listed. The review of systems is as noted above. My physical exam confirms the findings listed above. Review of labs, pathology reports, radiology reports and medical records confirm the findings noted above. I edited the note where appropriate in italics, strikethrough font, or underline. Patient with LE wound after trauma. Plan for OR for washout and coverage (potentially with STSG).     Juan Ball MD  [address] Reconstructive Surgery  [phone]  11/01/21

## 2021-11-01 NOTE — PROGRESS NOTES
Hospitalist Progress Note      PCP: INES BLANDON [address]    Date of Admission: 10/31/2021    Chief Complaint: Right leg laceration    Hospital Course: 52-year-old male presented to the hospital after injuring his right leg on the wheelchair sustaining a large laceration    Subjective: No acute events reported overnight. Patient states that his pain is so far well controlled. He is planned for going to the OR later today      Medications:  Reviewed    Infusion Medications    lactated ringers 50 mL/hr at 11/01/21 0847    sodium chloride       Scheduled Medications    ceFAZolin  2,000 mg IntraVENous On Call to OR    amLODIPine  5 mg Oral Daily    atorvastatin  80 mg Oral Nightly    sodium chloride flush  5-40 mL IntraVENous 2 times per day    influenza virus vaccine  0.5 mL IntraMUSCular Prior to discharge     PRN Meds: melatonin, sodium chloride flush, sodium chloride, ondansetron **OR** ondansetron, polyethylene glycol, acetaminophen **OR** acetaminophen, oxyCODONE      Intake/Output Summary (Last 24 hours) at 11/1/2021 1256  Last data filed at 11/1/2021 1135  Gross per 24 hour   Intake 250 ml   Output 500 ml   Net -250 ml       Physical Exam Performed:    /60   Pulse 74   Temp 97.1 °F (36.2 °C) (Oral)   Resp 16   Ht 5' 11\" (1.803 m)   Wt 194 lb (88 kg)   SpO2 94%   BMI 27.06 kg/m²     General appearance: No apparent distress, appears stated age and cooperative. HEENT: Pupils equal, round, and reactive to light. Conjunctivae/corneas clear. Neck: Supple, with full range of motion. No jugular venous distention. Trachea midline. Respiratory:  Normal respiratory effort. Clear to auscultation, bilaterally without Rales/Wheezes/Rhonchi. Cardiovascular: Regular rate and rhythm with normal S1/S2 without murmurs, rubs or gallops. Abdomen: Soft, non-tender, non-distended with normal bowel sounds.   Musculoskeletal: Right leg with dressings in place, large degloving skin tear located over the right thigh approximately 4 x 7 cm in size  Skin: Skin color, texture, turgor normal.  No rashes or lesions. Neurologic: Cranial nerves II/XII intact, bilateral lower extremity weakness noted  Psychiatric: Alert and oriented, thought content appropriate, normal insight  Capillary Refill: Brisk,3 seconds, normal   Peripheral Pulses: +2 palpable, equal bilaterally       Labs:   Recent Labs     10/31/21  1931 11/01/21  0446   WBC 11.4* 6.0   HGB 10.3* 8.7*   HCT 32.1* 26.0*   * 102*     Recent Labs     10/31/21  1931 11/01/21  0446    137   K 3.9 4.3    101   CO2 25 25   BUN 42* 44*   CREATININE 1.3 1.4*   CALCIUM 9.1 8.6     No results for input(s): AST, ALT, BILIDIR, BILITOT, ALKPHOS in the last 72 hours. Recent Labs     10/31/21  1931   INR 0.97     No results for input(s): [place] in the last 72 hours. Urinalysis:      Lab Results   Component Value Date    NITRU POSITIVE 06/19/2021    WBCUA None seen 06/19/2021    BACTERIA 4+ 06/05/2021    RBCUA None seen 06/19/2021    BLOODU TRACE-INTACT 06/19/2021    SPECGRAV 1.010 06/19/2021    GLUCOSEU Negative 06/19/2021       Radiology:  XR CHEST PORTABLE   Final Result   1. Interval radiographic improvement of right basilar atelectasis, airspace disease. 2. Mild groundglass changes of the right lung however also improved when compared to prior study.               Assessment/Plan:    Active Hospital Problems    Diagnosis     Leg laceration, unspecified laterality, initial encounter [S88.838X]      Right leg laceration  -General surgery and plastic surgery has been consulted  -plan for OR this afternoon for possible further debridement and STSG this afternoon  -Continue pain control    Hypertension  -Continue Norvasc    Hyperlipidemia  -Continue Lipitor    DVT Prophylaxis: Lovenox  Diet: Diet NPO  Code Status: Full Code    PT/OT Eval Status: Pending    Dispo -pending clinical course    Tawana Mac MD

## 2021-11-01 NOTE — PROGRESS NOTES
4 Eyes Admission Assessment     I agree as the admission nurse that 2 RN's have performed a thorough Head to Toe Skin Assessment on the patient. ALL assessment sites listed below have been assessed on admission. Areas assessed by both nurses:   [x]   Head, Face, and Ears   [x]   Shoulders, Back, and Chest  [x]   Arms, Elbows, and Hands   [x]   Coccyx, Sacrum, and Ischium  [x]   Legs, Feet, and Heels        Does the Patient have Skin Breakdown?   Yes a wound was noted on the Admission Assessment and an LDA was Initiated documentation include the Usha-wound, Wound Assessment, Measurements, Dressing Treatment, Drainage, and Color\",         Terrance Prevention initiated:  Yes   Wound Care Orders initiated:  yes      WOC nurse consulted for Pressure Injury (Stage 3,4, Unstageable, DTI, NWPT, and Complex wounds) or Terrance score 18 or lower:  no      Nurse 1 eSignature: Electronically signed by Esthela Turner RN on 10/31/21 at 10:33 PM EDT            Nurse 2 eSignature:   Electronically signed by Rachel Villa RN on 11/1/2021 at 6:25 AM

## 2021-11-01 NOTE — PROGRESS NOTES
Urine sample collected (new bag connected). Pt c/o burning and states schneider was placed 2 days ago with home health. Urine malodorous. Schneider and rich care given, along with bed bath. Pt turned on L side (stg 2 on coccyx MARISSA-new bed pad applied-some serous drainage noted with mild odor). Specialty bed ordered. Given Oxycodone for pain rated 7/10. Stated he had earache. MD notified. Pt now resting with eyes closed. Bed alarm on.  Will monitor-call light in reach

## 2021-11-01 NOTE — PROGRESS NOTES
OR called and surgery to be done tomorrow afternoon. Diet upgraded and dietary called.  They will take pt's dinner order shortly

## 2021-11-01 NOTE — CARE COORDINATION
Case Management Assessment           Initial Evaluation                Date / Time of Evaluation: 11/1/2021 4:10 PM                 Assessment Completed by: Jerry Mckenzie RN    Patient Name: Anatoly Levine     YOB: 1940  Diagnosis: Degloving injury [T14. 8XXA]  Leg laceration, unspecified laterality, initial encounter [M49.251H]  History of neurogenic bladder [Z87.448]  Pain of right lower extremity [C56.037]     Date / Time: 10/31/2021  6:51 PM    Patient Admission Status: Inpatient    If patient is discharged prior to next notation, then this note serves as note for discharge by case management. Current PCP: Tim Matt Patient: No    Chart Reviewed: Yes  Patient/ Family Interviewed: Yes    Initial assessment completed at bedside with: Patient    Hospitalization in the last 30 days: No    Emergency Contacts:  Extended Emergency Contact Information  Primary Emergency Contact: [place] [address] Phone: [phone]  Work Phone: [phone]  Mobile Phone: [phone]  Relation: Child  Secondary Emergency Contact: Frandy Hernández  Address: [address]  Home Phone: [phone]  Relation: Other    Advance Directives:   Code Status: Full [address]: No  Agent: NA  Contact Number: NA    Copy present: No     In paper Chart: No    Scanned into EMR No    Financial  Payor: Tuan Daniela / Plan: Silvana Geronimo PLUS HMO / Product Type: *No Product type* /     Pre-cert required for SNF: Yes    [phone] - F [phone]  [address] [place] [address]  Phone: [phone] Fax: [phone]      Potential assistance Purchasing Medications:    Does Patient want to participate in local refill/ meds to beds program?:      Meds To Beds General Rules:  1. Can ONLY be done Monday- Friday between 8:30am-5pm  2.  Prescription(s) must be in pharmacy by 3pm to be filled same day  3. Copy of patient's insurance/ prescription drug card and patient face sheet must be sent along with the prescription(s)  4. Cost of Rx cannot be added to hospital bill. If financial assistance is needed, please contact unit  or ;  or  CANNOT provide pharmacy voucher for patients co-pays  5. Patients can then  the prescription on their way out of the hospital at discharge, or pharmacy can deliver to the bedside if staff is available. (payment due at time of pick-up or delivery - cash, check, or card accepted)     Able to afford home medications/ co-pay costs: Yes    ADLS  Support Systems:      PT AM-PAC:     OT AM-PAC:       [place]: Single family home  Steps: unknown    Plans to RETURN to current housing: Yes  Barriers to RETURNING to current housin[address] [place] [address]  Currently ACTIVE with Home Health Care: Yes  Home Care Agency: [facility]  Phone: [phone]  Fax: [phone]    Currently ACTIVE with [place] on Aging: Yes  Passport/ Waiver: No  Passport/ Waiver Services: Other: transport, home health aid    : Sarita Mora Phone: [phone] Provider: NA  Equipment: wheelchair, bath bench and reacher    Home Oxygen and [address] prior to admission: No  Tameka [address]: Not Applicable  Other Respiratory Equipment: NA    Informed of need to bring portable home O2 tank on day of DISCHARGE for nursing to connect prior to leaving: No  Verbalized agreement/Understanding: No  Person to bring portable tank at discharge: NA    Dialysis  Active with HD/PD prior to admission: No  Nephrologist: Bertha Pang 61:  Not Applicable    DISCHARGE PLAN:  Disposition: Home with  [address]:  [facility]     Transportation PLAN for discharge: EMS transportation     Factors facilitating achievement of predicted outcomes: Has needed Durable Medical Equipment at home    Barriers to discharge: Pain    Additional Case Management Notes: CM met with patient at bedside, patient states he has to return home because he is out of skilled days and cannot afford to self pay. Patient will need transport home as he uses a wheelchair at baseline, patient states he has an aid through [employer] twice weekly. Will resume care upon discharge     The Plan for Transition of Care is related to the following treatment goals of Degloving injury [T14. 8XXA]  Leg laceration, unspecified laterality, initial encounter [S81.819A]  History of neurogenic bladder [Z87.448]  Pain of right lower extremity [M79.604]    The Patient and/or patient representative Susan Coburn and his family were provided with a choice of provider and agrees with the discharge plan Not Indicated    Freedom of choice list was provided with basic dialogue that supports the patient's individualized plan of care/goals and shares the quality data associated with the providers.  Not Indicated    Care Transition patient: No    Vandana Walden RN  [facility]  Case Management Department  Ph: [phone]

## 2021-11-01 NOTE — PROGRESS NOTES
Pt admitted to room 5306 from ED. Pt alert and oriented. VSS. Pt dressing has strikethrough noted, surgical residents made aware via perfect serve. Pt tolerating diet without issue. Pt has chronic schneider in place. Pt resting comfortably in bed. Pt has call light within reach, bed in lowest position with wheels locked, 2/4 side rails up, and bed alarm on. Will continue to monitor.

## 2021-11-02 VITALS — DIASTOLIC BLOOD PRESSURE: 52 MMHG | SYSTOLIC BLOOD PRESSURE: 97 MMHG | TEMPERATURE: 98.2 F | OXYGEN SATURATION: 98 %

## 2021-11-02 LAB
ALBUMIN SERPL-MCNC: 3.2 G/DL (ref 3.4–5)
ANION GAP SERPL CALCULATED.3IONS-SCNC: 10 MMOL/L (ref 3–16)
BASOPHILS ABSOLUTE: 0 K/UL (ref 0–0.2)
BASOPHILS RELATIVE PERCENT: 0.6 %
BUN BLDV-MCNC: 39 MG/DL (ref 7–20)
CALCIUM SERPL-MCNC: 7.9 MG/DL (ref 8.3–10.6)
CHLORIDE BLD-SCNC: 102 MMOL/L (ref 99–110)
CO2: 25 MMOL/L (ref 21–32)
CREAT SERPL-MCNC: 1.3 MG/DL (ref 0.8–1.3)
EOSINOPHILS ABSOLUTE: 0.3 K/UL (ref 0–0.6)
EOSINOPHILS RELATIVE PERCENT: 3.8 %
GFR AFRICAN AMERICAN: >60
GFR NON-AFRICAN AMERICAN: 53
GLUCOSE BLD-MCNC: 98 MG/DL (ref 70–99)
HCT VFR BLD CALC: 22.2 % (ref 40.5–52.5)
HEMOGLOBIN: 7.5 G/DL (ref 13.5–17.5)
LYMPHOCYTES ABSOLUTE: 1.5 K/UL (ref 1–5.1)
LYMPHOCYTES RELATIVE PERCENT: 19 %
MAGNESIUM: 2.3 MG/DL (ref 1.8–2.4)
MCH RBC QN AUTO: 31.3 PG (ref 26–34)
MCHC RBC AUTO-ENTMCNC: 33.6 G/DL (ref 31–36)
MCV RBC AUTO: 93 FL (ref 80–100)
MONOCYTES ABSOLUTE: 0.6 K/UL (ref 0–1.3)
MONOCYTES RELATIVE PERCENT: 8.4 %
NEUTROPHILS ABSOLUTE: 5.2 K/UL (ref 1.7–7.7)
NEUTROPHILS RELATIVE PERCENT: 68.2 %
PDW BLD-RTO: 17.8 % (ref 12.4–15.4)
PHOSPHORUS: 3.5 MG/DL (ref 2.5–4.9)
PLATELET # BLD: 106 K/UL (ref 135–450)
PMV BLD AUTO: 9.3 FL (ref 5–10.5)
POTASSIUM SERPL-SCNC: 4.7 MMOL/L (ref 3.5–5.1)
RBC # BLD: 2.39 M/UL (ref 4.2–5.9)
SODIUM BLD-SCNC: 137 MMOL/L (ref 136–145)
WBC # BLD: 7.6 K/UL (ref 4–11)

## 2021-11-02 PROCEDURE — 0HRKX74 REPLACEMENT OF RIGHT LOWER LEG SKIN WITH AUTOLOGOUS TISSUE SUBSTITUTE, PARTIAL THICKNESS, EXTERNAL APPROACH: ICD-10-PCS | Performed by: SURGERY

## 2021-11-02 PROCEDURE — 85025 COMPLETE CBC W/AUTO DIFF WBC: CPT

## 2021-11-02 PROCEDURE — 6360000002 HC RX W HCPCS: Performed by: NURSE ANESTHETIST, CERTIFIED REGISTERED

## 2021-11-02 PROCEDURE — 0HBHXZZ EXCISION OF RIGHT UPPER LEG SKIN, EXTERNAL APPROACH: ICD-10-PCS | Performed by: SURGERY

## 2021-11-02 PROCEDURE — 3700000001 HC ADD 15 MINUTES (ANESTHESIA): Performed by: SURGERY

## 2021-11-02 PROCEDURE — 6370000000 HC RX 637 (ALT 250 FOR IP): Performed by: STUDENT IN AN ORGANIZED HEALTH CARE EDUCATION/TRAINING PROGRAM

## 2021-11-02 PROCEDURE — 7100000000 HC PACU RECOVERY - FIRST 15 MIN: Performed by: SURGERY

## 2021-11-02 PROCEDURE — 2500000003 HC RX 250 WO HCPCS: Performed by: NURSE ANESTHETIST, CERTIFIED REGISTERED

## 2021-11-02 PROCEDURE — 0HXKXZZ TRANSFER RIGHT LOWER LEG SKIN, EXTERNAL APPROACH: ICD-10-PCS | Performed by: SURGERY

## 2021-11-02 PROCEDURE — 1200000000 HC SEMI PRIVATE

## 2021-11-02 PROCEDURE — 2580000003 HC RX 258: Performed by: STUDENT IN AN ORGANIZED HEALTH CARE EDUCATION/TRAINING PROGRAM

## 2021-11-02 PROCEDURE — 6370000000 HC RX 637 (ALT 250 FOR IP): Performed by: INTERNAL MEDICINE

## 2021-11-02 PROCEDURE — 7100000001 HC PACU RECOVERY - ADDTL 15 MIN: Performed by: SURGERY

## 2021-11-02 PROCEDURE — C9290 INJ, BUPIVACAINE LIPOSOME: HCPCS | Performed by: SURGERY

## 2021-11-02 PROCEDURE — 3600000014 HC SURGERY LEVEL 4 ADDTL 15MIN: Performed by: SURGERY

## 2021-11-02 PROCEDURE — 3600000004 HC SURGERY LEVEL 4 BASE: Performed by: SURGERY

## 2021-11-02 PROCEDURE — 2580000003 HC RX 258: Performed by: INTERNAL MEDICINE

## 2021-11-02 PROCEDURE — 36415 COLL VENOUS BLD VENIPUNCTURE: CPT

## 2021-11-02 PROCEDURE — 83735 ASSAY OF MAGNESIUM: CPT

## 2021-11-02 PROCEDURE — 6370000000 HC RX 637 (ALT 250 FOR IP): Performed by: SURGERY

## 2021-11-02 PROCEDURE — 2709999900 HC NON-CHARGEABLE SUPPLY: Performed by: SURGERY

## 2021-11-02 PROCEDURE — 15100 SPLT AGRFT T/A/L 1ST 100SQCM: CPT | Performed by: SURGERY

## 2021-11-02 PROCEDURE — 6360000002 HC RX W HCPCS: Performed by: SURGERY

## 2021-11-02 PROCEDURE — 6360000002 HC RX W HCPCS: Performed by: INTERNAL MEDICINE

## 2021-11-02 PROCEDURE — 80069 RENAL FUNCTION PANEL: CPT

## 2021-11-02 PROCEDURE — 2580000003 HC RX 258: Performed by: SURGERY

## 2021-11-02 PROCEDURE — 3700000000 HC ANESTHESIA ATTENDED CARE: Performed by: SURGERY

## 2021-11-02 PROCEDURE — 14301 TIS TRNFR ANY 30.1-60 SQ CM: CPT | Performed by: SURGERY

## 2021-11-02 RX ORDER — EPINEPHRINE NASAL SOLUTION 1 MG/ML
SOLUTION NASAL PRN
Status: DISCONTINUED | OUTPATIENT
Start: 2021-11-02 | End: 2021-11-02 | Stop reason: ALTCHOICE

## 2021-11-02 RX ORDER — MINERAL OIL
OIL (ML) MISCELLANEOUS PRN
Status: DISCONTINUED | OUTPATIENT
Start: 2021-11-02 | End: 2021-11-02 | Stop reason: ALTCHOICE

## 2021-11-02 RX ORDER — CEFAZOLIN SODIUM 2 G/50ML
SOLUTION INTRAVENOUS PRN
Status: DISCONTINUED | OUTPATIENT
Start: 2021-11-02 | End: 2021-11-02 | Stop reason: SDUPTHER

## 2021-11-02 RX ORDER — PROPOFOL 10 MG/ML
INJECTION, EMULSION INTRAVENOUS PRN
Status: DISCONTINUED | OUTPATIENT
Start: 2021-11-02 | End: 2021-11-02 | Stop reason: SDUPTHER

## 2021-11-02 RX ORDER — ONDANSETRON 2 MG/ML
INJECTION INTRAMUSCULAR; INTRAVENOUS PRN
Status: DISCONTINUED | OUTPATIENT
Start: 2021-11-02 | End: 2021-11-02 | Stop reason: SDUPTHER

## 2021-11-02 RX ORDER — MAGNESIUM HYDROXIDE 1200 MG/15ML
LIQUID ORAL CONTINUOUS PRN
Status: COMPLETED | OUTPATIENT
Start: 2021-11-02 | End: 2021-11-02

## 2021-11-02 RX ORDER — EPHEDRINE SULFATE 50 MG/ML
INJECTION INTRAVENOUS PRN
Status: DISCONTINUED | OUTPATIENT
Start: 2021-11-02 | End: 2021-11-02 | Stop reason: SDUPTHER

## 2021-11-02 RX ORDER — OXYCODONE HYDROCHLORIDE 5 MG/1
5 TABLET ORAL EVERY 6 HOURS PRN
Qty: 8 TABLET | Refills: 0 | Status: SHIPPED | OUTPATIENT
Start: 2021-11-02 | End: 2021-11-04

## 2021-11-02 RX ORDER — FENTANYL CITRATE 50 UG/ML
INJECTION, SOLUTION INTRAMUSCULAR; INTRAVENOUS PRN
Status: DISCONTINUED | OUTPATIENT
Start: 2021-11-02 | End: 2021-11-02 | Stop reason: SDUPTHER

## 2021-11-02 RX ORDER — MAGNESIUM CARB/ALUMINUM HYDROX 105-160MG
TABLET,CHEWABLE ORAL PRN
Status: DISCONTINUED | OUTPATIENT
Start: 2021-11-02 | End: 2021-11-02 | Stop reason: ALTCHOICE

## 2021-11-02 RX ADMIN — SODIUM CHLORIDE, PRESERVATIVE FREE 10 ML: 5 INJECTION INTRAVENOUS at 21:11

## 2021-11-02 RX ADMIN — PROPOFOL 100 MG: 10 INJECTION, EMULSION INTRAVENOUS at 08:38

## 2021-11-02 RX ADMIN — ENOXAPARIN SODIUM 40 MG: 100 INJECTION SUBCUTANEOUS at 07:44

## 2021-11-02 RX ADMIN — EPHEDRINE SULFATE 20 MG: 50 INJECTION INTRAVENOUS at 08:42

## 2021-11-02 RX ADMIN — PHENYLEPHRINE HYDROCHLORIDE 200 MCG: 10 INJECTION, SOLUTION INTRAMUSCULAR; INTRAVENOUS; SUBCUTANEOUS at 08:42

## 2021-11-02 RX ADMIN — EPHEDRINE SULFATE 10 MG: 50 INJECTION INTRAVENOUS at 08:38

## 2021-11-02 RX ADMIN — EPHEDRINE SULFATE 10 MG: 50 INJECTION INTRAVENOUS at 09:00

## 2021-11-02 RX ADMIN — CEFAZOLIN SODIUM 2000 MG: 2 SOLUTION INTRAVENOUS at 08:45

## 2021-11-02 RX ADMIN — EPHEDRINE SULFATE 10 MG: 50 INJECTION INTRAVENOUS at 09:15

## 2021-11-02 RX ADMIN — SODIUM CHLORIDE, POTASSIUM CHLORIDE, SODIUM LACTATE AND CALCIUM CHLORIDE 950 ML: 600; 310; 30; 20 INJECTION, SOLUTION INTRAVENOUS at 04:14

## 2021-11-02 RX ADMIN — ATORVASTATIN CALCIUM 80 MG: 80 TABLET, FILM COATED ORAL at 21:11

## 2021-11-02 RX ADMIN — FENTANYL CITRATE 25 MCG: 50 INJECTION, SOLUTION INTRAMUSCULAR; INTRAVENOUS at 09:05

## 2021-11-02 RX ADMIN — ONDANSETRON 4 MG: 2 INJECTION INTRAMUSCULAR; INTRAVENOUS at 08:48

## 2021-11-02 RX ADMIN — OXYCODONE 5 MG: 5 TABLET ORAL at 04:15

## 2021-11-02 RX ADMIN — SODIUM CHLORIDE, PRESERVATIVE FREE 10 ML: 5 INJECTION INTRAVENOUS at 07:45

## 2021-11-02 RX ADMIN — AMLODIPINE BESYLATE 5 MG: 5 TABLET ORAL at 07:44

## 2021-11-02 RX ADMIN — ACETAMINOPHEN 650 MG: 325 TABLET ORAL at 04:15

## 2021-11-02 ASSESSMENT — PULMONARY FUNCTION TESTS
PIF_VALUE: 8
PIF_VALUE: 8
PIF_VALUE: 7
PIF_VALUE: 7
PIF_VALUE: 9
PIF_VALUE: 15
PIF_VALUE: 7
PIF_VALUE: 8
PIF_VALUE: 1
PIF_VALUE: 6
PIF_VALUE: 5
PIF_VALUE: 6
PIF_VALUE: 8
PIF_VALUE: 8
PIF_VALUE: 1
PIF_VALUE: 3
PIF_VALUE: 8
PIF_VALUE: 6
PIF_VALUE: 8
PIF_VALUE: 8
PIF_VALUE: 7
PIF_VALUE: 8
PIF_VALUE: 6
PIF_VALUE: 8
PIF_VALUE: 5
PIF_VALUE: 3
PIF_VALUE: 5
PIF_VALUE: 6
PIF_VALUE: 7
PIF_VALUE: 8
PIF_VALUE: 7
PIF_VALUE: 6
PIF_VALUE: 8
PIF_VALUE: 9
PIF_VALUE: 7
PIF_VALUE: 7
PIF_VALUE: 1
PIF_VALUE: 7
PIF_VALUE: 7
PIF_VALUE: 8
PIF_VALUE: 5
PIF_VALUE: 2
PIF_VALUE: 15
PIF_VALUE: 8
PIF_VALUE: 5
PIF_VALUE: 6
PIF_VALUE: 7
PIF_VALUE: 3
PIF_VALUE: 12
PIF_VALUE: 7
PIF_VALUE: 6
PIF_VALUE: 5
PIF_VALUE: 8
PIF_VALUE: 7
PIF_VALUE: 7
PIF_VALUE: 8
PIF_VALUE: 7
PIF_VALUE: 8
PIF_VALUE: 6
PIF_VALUE: 7
PIF_VALUE: 7
PIF_VALUE: 1
PIF_VALUE: 9
PIF_VALUE: 7
PIF_VALUE: 6
PIF_VALUE: 8

## 2021-11-02 ASSESSMENT — PAIN SCALES - GENERAL
PAINLEVEL_OUTOF10: 0
PAINLEVEL_OUTOF10: 0
PAINLEVEL_OUTOF10: 7

## 2021-11-02 ASSESSMENT — PAIN DESCRIPTION - LOCATION: LOCATION: BACK

## 2021-11-02 ASSESSMENT — PAIN DESCRIPTION - PAIN TYPE
TYPE: ACUTE PAIN
TYPE: ACUTE PAIN

## 2021-11-02 ASSESSMENT — PAIN DESCRIPTION - ORIENTATION: ORIENTATION: LOWER

## 2021-11-02 NOTE — PROGRESS NOTES
Plastic Surgery   Daily Progress Note  Patient: Yonny Block      CC: RLE skin avulsion     SUBJECTIVE:   No acute issues overnight. Patient has remained afebrile and HDS. Yesterday OR was cancelled, patient moved to today's schedule. Pain is well-controlled. ROS:   A 14 point review of systems was conducted, significant findings as noted above. All other systems negative. OBJECTIVE:    PHYSICAL EXAM:    Vitals:    11/01/21 1605 11/01/21 2000 11/02/21 0108 11/02/21 0416   BP: 122/63 133/65 115/64 118/70   Pulse: 73 79 76 75   Resp: 16 16 16 16   Temp: 97.8 °F (36.6 °C) 98.3 °F (36.8 °C) 98 °F (36.7 °C) 98.1 °F (36.7 °C)   TempSrc: Oral Oral Oral Oral   SpO2: 91% 94% 94% 90%   Weight:       Height:           General appearance: alert, no acute distress, grooming appropriate  HEENT: Normocephalic, atraumatic; EOMI; moist mucous membranes  Neck: trachea midline, no JVD, no lymphadenopathy  Chest/Lungs: Normal inspiratory effort, symmetric chest rise, no accessory muscle use  Cardiovascular: Regular rate and rhythm; perfusing extremities  Abdomen: soft, non-tender, non-distended, no guarding/rigidity  Skin: warm and dry, no rashes  Extremities: no edema, no cyanosis  RLE: Dressed intact with no staining    LABS:   Recent Labs     11/01/21  0446 11/02/21  0430   WBC 6.0 7.6   HGB 8.7* 7.5*   HCT 26.0* 22.2*   MCV 94.7 93.0   * 106*        Recent Labs     11/01/21  0446 11/02/21  0430    137   K 4.3 4.7    102   CO2 25 25   PHOS  --  3.5   BUN 44* 39*   CREATININE 1.4* 1.3      No results for input(s): AST, ALT, ALB, BILIDIR, BILITOT, ALKPHOS in the last 72 hours. No results for input(s): LIPASE, AMYLASE in the last 72 hours. Recent Labs     10/31/21  1931   INR 0.97   APTT 42.3*      No results for input(s): CKTOTAL, CKMB, CKMBINDEX, TROPONINI in the last 72 hours.       ASSESSMENT & PLAN:   Susi Graff is a [de-identified] y.o. male with the aforementioned history who presents for RLE laceration.    - Patient to remain NPO in preparation for OR  - Patient to go to OR today for debridement and split-thickness skin graft    Emily Davidson DO  PGY1, General Surgery  11/02/21  6:29 AM  [phone]

## 2021-11-02 NOTE — PROGRESS NOTES
Patient is A&Ox4. VSS   O2Sat was 89% without NC - put NC back on patient at 2L and sat up to 98%. Repositioned patient from lying on right side with foam wedge to lying on left side with foam wedge. Wound vac is in place and working appropriately. Minimal drainage noted at donor site. Will continue to monitor for remaining of shift. Changed bedpad and dressed pressure wound on coccyx with new dressing and covered with foam pad. Will continue to monitor patient for comfort, dressing drainage and pain. Call light, bed table, bed alarm and bed in lowest position.

## 2021-11-02 NOTE — BRIEF OP NOTE
Brief Postoperative Note      Patient: Mraleny Arndt  YOB: 1940  MRN: [id number]    Date of Procedure: 11/2/2021    Pre-Op Diagnosis: RIGHT LOWER EXTREMITY WOUND    Post-Op Diagnosis: Same       Procedure(s):  RIGHT LOWER EXTREMITY ADVANCEMENT FLAP AND SPLIT THICKNESS SKIN GRAFT PLACEMENT; (WOUND- 10 CM X 5.5 CM; CLOSURE- 6 CM X 5.5 CM; SKIN GRAFT- 7.2 CM X 5 CM)    Surgeon(s):  Tien Hi MD    Assistant:  Surgical Assistant: J Luis Gaston  Resident: Redd Steve DO    Anesthesia: General    Estimated Blood Loss (mL): 10    Complications: None    Specimens:   * No specimens in log *    Implants:  * No implants in log *      Drains:   Negative Pressure Wound Therapy Leg Anterior; Lower;Right (Active)       Urethral Catheter (Active)   Catheter Indications Prolonged immobilization (e.g. unstable thoracic or lumbar spine, multiple traumatic injuries such as pelvic fractures) 11/02/21 0421   Site Assessment No urethral drainage 11/02/21 0421   Urine Color Yellow 11/02/21 0421   Urine Appearance Clear 11/02/21 0421   Urine Odor Malodorous 11/02/21 0421   Output (mL) 325 mL 11/02/21 0421       [REMOVED] Urethral Catheter Latex 16 fr (Removed)       Findings: inferior aspect of avulsed skin used for advancement flap. Donor site from right thigh. Black foam covered in adaptic covering STSG, to prevena setting.      Electronically signed by Redd Steve DO on 11/2/2021 at 9:48 AM

## 2021-11-02 NOTE — PROGRESS NOTES
Hospitalist Progress Note      PCP: INES BLANDON [address]    Date of Admission: 10/31/2021    Chief Complaint: Right leg laceration    Hospital Course: 81-year-old male presented to the hospital after injuring his right leg on the wheelchair sustaining a large laceration    Subjective: Patient went to the OR today for skin grafting. Postoperatively he was doing well. Pain was adequately controlled      Medications:  Reviewed    Infusion Medications    sodium chloride       Scheduled Medications    enoxaparin  40 mg SubCUTAneous Daily    amLODIPine  5 mg Oral Daily    atorvastatin  80 mg Oral Nightly    sodium chloride flush  5-40 mL IntraVENous 2 times per day    influenza virus vaccine  0.5 mL IntraMUSCular Prior to discharge     PRN Meds: melatonin, sodium chloride flush, sodium chloride, ondansetron **OR** ondansetron, polyethylene glycol, acetaminophen **OR** acetaminophen, oxyCODONE      Intake/Output Summary (Last 24 hours) at 11/2/2021 1416  Last data filed at 11/2/2021 1032  Gross per 24 hour   Intake 650 ml   Output 900 ml   Net -250 ml       Physical Exam Performed:    /66   Pulse 68   Temp 97.4 °F (36.3 °C) (Oral)   Resp 16   Ht 5' 11\" (1.803 m)   Wt 194 lb (88 kg)   SpO2 97%   BMI 27.06 kg/m²     General appearance: No apparent distress, appears stated age and cooperative. HEENT: Pupils equal, round, and reactive to light. Conjunctivae/corneas clear. Neck: Supple, with full range of motion. No jugular venous distention. Trachea midline. Respiratory:  Normal respiratory effort. Clear to auscultation, bilaterally without Rales/Wheezes/Rhonchi. Cardiovascular: Regular rate and rhythm with normal S1/S2 without murmurs, rubs or gallops. Abdomen: Soft, non-tender, non-distended with normal bowel sounds.   Musculoskeletal: Right leg with dressings in place, large degloving skin tear located over the right thigh approximately 4 x 7 cm in size  Skin: Skin color, texture, turgor normal. No rashes or lesions. Neurologic: Cranial nerves II/XII intact, bilateral lower extremity weakness noted  Psychiatric: Alert and oriented, thought content appropriate, normal insight  Capillary Refill: Brisk,3 seconds, normal   Peripheral Pulses: +2 palpable, equal bilaterally       Labs:   Recent Labs     10/31/21  1931 11/01/21  0446 11/02/21  0430   WBC 11.4* 6.0 7.6   HGB 10.3* 8.7* 7.5*   HCT 32.1* 26.0* 22.2*   * 102* 106*     Recent Labs     10/31/21  1931 11/01/21  0446 11/02/21  0430    137 137   K 3.9 4.3 4.7    101 102   CO2 25 25 25   BUN 42* 44* 39*   CREATININE 1.3 1.4* 1.3   CALCIUM 9.1 8.6 7.9*   PHOS  --   --  3.5     No results for input(s): AST, ALT, BILIDIR, BILITOT, ALKPHOS in the last 72 hours. Recent Labs     10/31/21  1931   INR 0.97     No results for input(s): Giselle Mould in the last 72 hours. Urinalysis:      Lab Results   Component Value Date    NITRU POSITIVE 11/01/2021    WBCUA 6-9 11/01/2021    BACTERIA 2+ 11/01/2021    RBCUA 3-4 11/01/2021    BLOODU MODERATE 11/01/2021    SPECGRAV 1.010 11/01/2021    GLUCOSEU Negative 11/01/2021       Radiology:  XR CHEST PORTABLE   Final Result   1. Interval radiographic improvement of right basilar atelectasis, airspace disease. 2. Mild groundglass changes of the right lung however also improved when compared to prior study. Assessment/Plan:    Active Hospital Problems    Diagnosis     Pain of right lower extremity [M79.604]     Degloving injury [T14. 8XXA]     Leg laceration, unspecified laterality, initial encounter [S8.900C]      Right leg laceration  -Plastic surgery following  -Status post STSG, application of wound VAC  -Continue pain control    Hypertension  -Continue Norvasc    Hyperlipidemia  -Continue Lipitor    DVT Prophylaxis: Lovenox  Diet: ADULT DIET;  Regular  Code Status: Full Code    PT/OT Eval Status: Pending    Dispo -pending surgery clearance for discharge    Annalise Lisa MD

## 2021-11-02 NOTE — PROGRESS NOTES
Plastic Surgery  Post-operative Note      Procedure(s) Performed: Right lower extremity advancement flap and split-thickness skin graft    Subjective:   Patient's pain is controlled, denies nausea or vomiting. Tolerating diet. Patient has not yet gotten OOB. He is voiding appropriately. Denies flatus or BM at this time. Objective:  Anesthesia type: General      I/O    Intra op    Post op     Fluids  600 mL 50 mL     EBL 10 mL 0 mL     Urine Not recorded 200 mL     Vitals:   Vitals:    11/02/21 1015 11/02/21 1026 11/02/21 1030 11/02/21 1055   BP: (!) 112/58  (!) 110/58 122/66   Pulse: 67 64 70 68   Resp: (!) 7 (!) 6 12 16   Temp:    97.4 °F (36.3 °C)   TempSrc:    Oral   SpO2: 95% 95% 95% 97%   Weight:       Height:           Physical Exam:  Post-op vital signs:  Stable   General appearance: alert, no acute distress, grooming appropriate  Eyes: No scleral icterus, EOM grossly intact  Neck: trachea midline, no JVD, no lymphadenopathy, neck supple  Chest/Lungs: normal effort with no accessory muscle use on RA  Cardiovascular: RRR, brisk capillary refill distally  Abdomen: Soft, non-tender, non-distended, no rebound, guarding, or rigidity. Skin: warm and dry, no rashes  : Iglesias catheter in place with clear, yellow urine  Extremities: edema of left distal extremity with dry skin. RLE: ACE wrap over Kerlix over Prevena wound vac (holding adequate suction) in place without strike-through. [place] site at right thigh with minimal bleeding beneath tegarderm, Zeroform intact   Neuro: A&Ox3, no focal deficits, sensation intact    Assessment and Plan  This is a [de-identified]y.o. year old male status post right lower extremity advancement flap and split-thickness skin graft secondary to right lower extremity skin avulsion wound. (11/2) POD0.     Pain management: PRN tylenol and Roxicodone PRN  Cardiovasc: hemodynamically stable, will continue to monitor  Respiratory:  IS ordered to bedside, encourage hourly IS and deep breathing, wean oxygen as tolerated  Fluids:  PO, Diet: General diet  : Urine output is adequate, patient with chronic Iglesias catheter in place  Ambulation: OOB to chair, encourage ambulation  Prophylaxis: SCDs, lovenox  Antibiotics: None  Wound: Prevena incisional wound vac in place holding good suction with 2 checkmarks on hospital vac machine. Switched over to home Prevena wound vac, and plugged into wall    - Patient ok to discharge home from a Plastic Surgery standpoint. Follow-up instructions and discharge instructions placed in chart.     Dejuan Ovalles DO  PGY1, General Surgery  11/02/21  1:46 PM  [phone]

## 2021-11-02 NOTE — PROGRESS NOTES
PACU Transfer Note    Vitals:    11/02/21 1030   BP: (!) 110/58   Pulse: 70   Resp: 12   Temp: 98.5   SpO2: 95%       In: 650 [I.V.:650]  Out: 200 [Urine:200]    Pain assessment:  present - adequately treated  Pain Level: 0    Report given to Receiving unit RN.    11/2/2021 10:50 AM

## 2021-11-02 NOTE — ANESTHESIA PRE PROCEDURE
Department of Anesthesiology  Preprocedure Note       Name:  Dunia Plaza   Age:  [de-identified] y.o.  :  1940                                          MRN:  [id number]         Date:  2021      Surgeon: Sekou Encarnacion):  Sully Martinez MD    Procedure: Procedure(s):  RIGHT LOWER EXTREMITY ADVANCEMENT FLAP AND SPLIT THICKNESS SKIN GRAFT PLACEMENT; (WOUND- 10 CM X 5.5 CM; CLOSURE- 6 CM X 5.5 CM; SKIN GRAFT- 7.2 CM X 5 CM)    Medications prior to admission:   Prior to Admission medications    Medication Sig Start Date End Date Taking? Authorizing Provider   furosemide (LASIX) 40 MG tablet TAKE 1 TABLET TWICE DAILY 21  Yes TIM Sarmiento - CNP   gabapentin (NEURONTIN) 600 MG tablet Take 600 mg by mouth 2 times daily. 21  Yes Historical Provider, MD   amLODIPine (NORVASC) 5 MG tablet Take 1 tablet by mouth daily 21  Yes Fernando Meredith MD   vitamin B-12 (CYANOCOBALAMIN) 1000 MCG tablet Take 1,000 mcg by mouth daily   Yes Historical Provider, MD   aspirin 81 MG chewable tablet Take 1 tablet by mouth daily 20  Yes Gudelia Mac MD   pantoprazole (PROTONIX) 40 MG tablet Take 1 tablet by mouth every morning (before breakfast) 20  Yes Gudelia Mac MD   tamsulosin (FLOMAX) 0.4 MG capsule Take 0.8 mg by mouth daily    Yes Historical Provider, MD   Melatonin 10 MG TABS Take 10 mg by mouth nightly as needed    Yes Historical Provider, MD   atorvastatin (LIPITOR) 80 MG tablet Take 80 mg by mouth nightly.    Yes Historical Provider, MD   docusate sodium (COLACE) 100 MG capsule Take 1 capsule by mouth daily as needed for Constipation 21   Carlos Manuel Rivera MD   [facility]) OINT ointment Apply topically daily as needed    Historical Provider, MD   docusate sodium (COLACE) 100 MG capsule Take 100 mg by mouth daily as needed for Constipation    Historical Provider, MD   dicyclomine (BENTYL) 20 MG tablet Take 20 mg by mouth 3 times daily as needed 12/3/20   Historical Provider, MD zolpidem (AMBIEN) 10 MG tablet Take 10 mg by mouth nightly as needed for Sleep. Historical Provider, MD       Current medications:    Current Facility-Administered Medications   Medication Dose Route Frequency Provider Last Rate Last Admin    enoxaparin (LOVENOX) injection 40 mg  40 mg SubCUTAneous Daily Idalia Edinger, DO   40 mg at 11/02/21 0744    amLODIPine (NORVASC) tablet 5 mg  5 mg Oral Daily Idalia Edinger, DO   5 mg at 11/02/21 0744    atorvastatin (LIPITOR) tablet 80 mg  80 mg Oral Nightly Idalia Edinger, DO   80 mg at 11/01/21 2158    melatonin disintegrating tablet 10 mg  10 mg Oral Nightly PRN Idalia Edinger, DO   10 mg at 11/01/21 2232    sodium chloride flush 0.9 % injection 5-40 mL  5-40 mL IntraVENous 2 times per day Idalia Edinger, DO   10 mL at 11/02/21 0745    sodium chloride flush 0.9 % injection 5-40 mL  5-40 mL IntraVENous PRN Idalia Edinger, DO        0.9 % sodium chloride infusion  25 mL IntraVENous PRN Idalia Edinger, DO        ondansetron (ZOFRAN-ODT) disintegrating tablet 4 mg  4 mg Oral Q8H PRN Idalia Edinger, DO        Or    [facility]ansetron [facility] injection 4 mg  4 mg IntraVENous Q6H PRN Idalia Edinger, DO        polyethylene glycol (GLYCOLAX) packet 17 g  17 g Oral Daily PRN Idalia Edinger, DO        acetaminophen (TYLENOL) tablet 650 mg  650 mg Oral Q6H PRN Idalia Edinger, DO   650 mg at 11/02/21 0415    Or    acetaminophen (TYLENOL) suppository 650 mg  650 mg Rectal Q6H PRN Idalia Edinger, DO        oxyCODONE (ROXICODONE) immediate release tablet 5 mg  5 mg Oral Q6H PRN Idalia Edinger, DO   5 mg at 11/02/21 0415    influenza quadrivalent split vaccine (FLUZONE;FLUARIX;FLULAVAL;AFLURIA) injection 0.5 mL  0.5 mL IntraMUSCular Prior to discharge Idalia Edinger, DO           Allergies:     Allergies   Allergen Reactions    Bactrim [Sulfamethoxazole-Trimethoprim] Rash       Problem List:    Patient Active Problem List   Diagnosis Code    Hypotension I95.9    Light headed R42    Cellulitis L03.90    Essential hypertension I10    GERD (gastroesophageal reflux disease) K21.9    Acute blood loss anemia D62    Tinea corporis B35.4    Carotid stenosis I65.29    CAD (coronary artery disease) I25.10    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 Vanessa Ebbs    Complicated UTI (urinary tract infection) N39.0    Edema R60.9    Problem with urinary catheter ([facility] [address] T83. 9XXA    Acute encephalopathy G93.40    Chronic indwelling Iglesias catheter Z97.8    Hyperlipidemia E78.5    Bilateral lower leg cellulitis L03.116, L03.115    Neurogenic bladder N31.9    Bacteriuria R82.71    Abnormality of urethral meatus Q64.70    Gross hematuria R31.0    CHF with unknown LVEF ([facility]) I50.9    Dyspnea R06.00    Bradycardia R00.1    Pseudomonas infection A49.8    Acute renal injury ([facility] [address] N17.9    Cellulitis of scrotum N49.2    Constipation K59.00    Encopresis with constipation and overflow incontinence R15.9    Abnormal stress test R94.39    H/O angiography Z92.89    Abnormal angiogram R93.89    Acute cystitis with hematuria N30.01    Acute renal failure superimposed on stage 3 chronic kidney disease ([facility]) N17.9, N18.30    Urinary tract infection associated with indwelling urethral catheter ([facility]) T83.511A, N39.0    Encephalopathy acute G93.40    Altered mental status R41.82    Hyperkalemia E87.5    Leg laceration, unspecified laterality, initial encounter S81.819A    Pain of right lower extremity M79.604    Degloving injury T14. 8XXA       Past Medical History:        Diagnosis Date    BPH (benign prostatic hyperplasia)     Carotid stenosis 12/2013    JESU 99-37% stenosis; LICA 27-70% stenosis    Cellulitis 12/2013    LLE    Chronic back pain     Diverticular disease     Erectile dysfunction     GERD (gastroesophageal reflux disease)     History of atrial fibrillation     Hypercholesteremia     Hypertension     Lower GI bleed     MDRO (multiple drug resistant organisms) resistance 10/26/2019    urine    Neuromuscular disorder (HCC)     spasticity    Renal insufficiency     Risk for falls     uses walker    Tinea corporis 2013    LLE    Vitamin B12 deficiency        Past Surgical History:        Procedure Laterality Date    BACK SURGERY  2006    lower lumbar    CORONARY ARTERY BYPASS GRAFT  2013    CABG x 5 (Dr Galo Barba), svg to diag, om1 and 3, distal rca, kelly to lad.  CYSTOSCOPY N/A 1/10/2019    CYSTOSCOPY performed by Sera Fatima MD at [facility] [address] Right 2015    ORIF    LEG SURGERY Right 2021    RIGHT LOWER EXTREMITY ADVANCEMENT FLAP AND SPLIT THICKNESS SKIN GRAFT PLACEMENT; (WOUND- 10 CM X 5.5 CM; CLOSURE- 6 CM X 5.5 CM; SKIN GRAFT- 7.2 CM X 5 CM) performed by Monika Chan MD at [address] [place] [address] 2020    [facility] performed by Mary Amador MD at [facility] N/A 2020    EGD BIOPSY [facility] MD [facility] [address] [facility] History:    Social History     Tobacco Use    Smoking status: Former Smoker     Quit date: 1969     Years since quittin.9    Smokeless tobacco: Never Used   Substance Use Topics    Alcohol use:  No                                Counseling given: Not Answered      Vital Signs (Current):   Vitals:    21 1015 21 1026 21 1030 21 1055   BP: (!) 112/58  (!) 110/58 122/66   Pulse: 67 64 70 68   Resp: (!) 7 (!) 6 12 16   Temp:    97.4 °F (36.3 °C)   TempSrc:    Oral   SpO2: 95% 95% 95% 97%   Weight:       Height:                                                  BP Readings from Last 3 Encounters:   21 122/66   21 (!) 97/52   21 (!) 108/55       NPO Status: BMI:   Wt Readings from Last 3 Encounters:   10/31/21 194 lb (88 kg)   09/27/21 187 lb (84.8 kg)   09/26/21 187 lb (84.8 kg)     Body mass index is 27.06 kg/m². CBC:   Lab Results   Component Value Date    WBC 7.6 11/02/2021    RBC 2.39 11/02/2021    HGB 7.5 11/02/2021    HCT 22.2 11/02/2021    MCV 93.0 11/02/2021    RDW 17.8 11/02/2021     11/02/2021       CMP:   Lab Results   Component Value Date     11/02/2021    K 4.7 11/02/2021    K 4.3 11/01/2021     11/02/2021    CO2 25 11/02/2021    BUN 39 11/02/2021    CREATININE 1.3 11/02/2021    GFRAA >60 11/02/2021    AGRATIO 0.8 06/19/2021    LABGLOM 53 11/02/2021    GLUCOSE 98 11/02/2021    PROT 6.6 06/22/2021    CALCIUM 7.9 11/02/2021    BILITOT 0.4 06/20/2021    ALKPHOS 65 06/20/2021    AST 13 06/20/2021    ALT 9 06/20/2021       POC Tests: No results for input(s): POCGLU, POCNA, POCK, POCCL, POCBUN, POCHEMO, POCHCT in the last 72 hours.     Coags:   Lab Results   Component Value Date    PROTIME 11.0 10/31/2021    INR 0.97 10/31/2021    APTT 42.3 10/31/2021       HCG (If Applicable): No results found for: PREGTESTUR, PREGSERUM, HCG, HCGQUANT     ABGs:   Lab Results   Component Value Date    PHART 7.352 02/07/2020    PO2ART 95.3 02/07/2020    GRC3ANR 44.4 02/07/2020    HIS6IOD 24 02/07/2020    BEART -1.0 02/07/2020    E8TMMHOQ 98 02/07/2020        Type & Screen (If Applicable):  No results found for: LABABO, LABRH    Drug/Infectious Status (If Applicable):  No results found for: HIV, HEPCAB    COVID-19 Screening (If Applicable):   Lab Results   Component Value Date    COVID19 Not Detected 04/24/2021           Anesthesia Evaluation  Patient summary reviewed no history of anesthetic complications:   Airway: Mallampati: IV        Dental:      Comment: Poor dentition    Pulmonary:                              Cardiovascular:    (+) hypertension:, CABG/stent:, CHF:,                   Neuro/Psych: ROS comment: Lumbar DDD GI/Hepatic/Renal:   (+) GERD:, renal disease: CRI,          ROS comment: BPH        Diverticular disease. Endo/Other:                     Abdominal:             Vascular: Other Findings:             Anesthesia Plan      general     ASA 3       Induction: intravenous. Anesthetic plan and risks discussed with patient.                       Deepa Castanon MD   11/2/2021

## 2021-11-02 NOTE — PROGRESS NOTES
Pt A&Ox4, VSS. Pain controlled prn pain meds per MAR. NPO @ 2 am. Pt needs met at this time will con't to monitor.

## 2021-11-02 NOTE — ANESTHESIA POSTPROCEDURE EVALUATION
Department of Anesthesiology  Postprocedure Note    Patient: Arvind Mendes  MRN: [id number]  YOB: 1940  Date of evaluation: 11/2/2021  Time:  12:49 PM     Procedure Summary     Date: 11/02/21 Room / Location: [facility] [address] / [facility]    Anesthesia Start: 0286 Anesthesia Stop: 0126    Procedure: RIGHT LOWER EXTREMITY ADVANCEMENT FLAP AND SPLIT THICKNESS SKIN GRAFT PLACEMENT; (WOUND- 10 CM X 5.5 CM; CLOSURE- 6 CM X 5.5 CM; SKIN GRAFT- 7.2 CM X 5 CM) (Right Leg Lower) Diagnosis: (RIGHT LOWER EXTREMITY WOUND)    Surgeons: Amada Sanders MD Responsible Provider: Lulu Bruner MD    Anesthesia Type: Not recorded ASA Status: Not recorded          Anesthesia Type: No value filed. Twila Phase I: Twila Score: 9    Twila Phase II:      Last vitals: Reviewed and per EMR flowsheets.        Anesthesia Post Evaluation    Patient location during evaluation: bedside  Patient participation: complete - patient participated  Level of consciousness: awake  Airway patency: patent  Nausea & Vomiting: no nausea and no vomiting  Complications: no  Cardiovascular status: hemodynamically stable  Respiratory status: acceptable  Hydration status: stable

## 2021-11-02 NOTE — CARE COORDINATION
Case Management Assessment           Daily Note                 Date/ Time of Note: 11/2/2021 3:54 PM         Note completed by: Catracho Hartmann RN    Patient Name: Neel Dejesus  YOB: 1940    Diagnosis:Degloving injury [facility]. 8XXA]  Leg laceration, unspecified laterality, initial encounter [Y07.448K]  History of neurogenic bladder [Z87.448]  Pain of right lower extremity [M79.604]  Patient Admission Status: Inpatient    Date of Admission:10/31/2021  6:51 PM Length of Stay: 2 GLOS: GMLOS: 2.7    Current Plan of Care: Prevena wound vac, pain management  ________________________________________________________________________________________  PT AM-PAC:   / 24 per last evaluation on: TBD    OT AM-PAC:   / 24 per last evaluation on: TBD    DME Needs for discharge: Has wheelchair, lift chair, reacher, sock aid at home   ________________________________________________________________________________________  Discharge Plan: Home vs SNF    Tentative discharge date: 11/03/2021    Current barriers to discharge: 11/03/2021    Referrals completed: SNF: [facility], [address] [facility]  [phone]    Resources/ information provided: Wound VAC Information  ________________________________________________________________________________________  Case Management Notes: CM met with patient and daughter Nika Downing at bedside. Per patient/daughter, patient is out of medicare days. Referrals sent too [facility] to review for medicare vs self pay as both patient and daughter state that patient is not safe to go home alone. Ardyce Cheadle and his family were provided with choice of provider; he and his family are in agreement with the discharge plan.     Care Transition Patient: Sharmila Hartmann RN  [facility] [employer]  Case Management Department  Ph: [phone]

## 2021-11-02 NOTE — PROGRESS NOTES
Pt arrived calm no SS of pain report received from Dr Mary Miranda and [address] [facility] CRNA pt needed pressure support in OR for low BP RIGHT LOWER EXTREMITY ADVANCEMENT FLAP AND SPLIT THICKNESS SKIN GRAFT PLACEMENT; (WOUND- 10 CM X 5.5 CM; CLOSURE- 6 CM X 5.5 CM; SKIN GRAFT- 7.2 CM X 5 CM)

## 2021-11-02 NOTE — DISCHARGE INSTR - COC
Continuity of Care Form    Patient Name: Anatoly Levine   :  1940  MRN:  [id number]    Admit date:  10/31/2021  Discharge date:  21    Code Status Order: Full Code   Advance Directives:      Admitting Physician:  Yaa Matias MD  PCP: Charles Ramirez    Discharging Nurse: [facility] Unit/Room#: 1596/9251-66  Discharging Unit Phone Number: [phone]    Emergency Contact:   Extended Emergency Contact Information  [facility] Phone: [phone]  Work Phone: [phone]  Mobile Phone: [phone]  Relation: Child  Secondary Emergency Contact: Frandy Hernández  Address: [address]  Home Phone: [phone]  Relation: Other    Past Surgical History:  Past Surgical History:   Procedure Laterality Date    BACK SURGERY  2006    lower lumbar    CORONARY ARTERY BYPASS GRAFT  2013    CABG x 5 (Dr Davon Khan), svg to diag, om1 and 3, distal rca, kelly to lad.      CYSTOSCOPY N/A 1/10/2019    CYSTOSCOPY performed by Norma Gill MD at [facility] [address] Right 2015    ORIF    LEG SURGERY Right 2021    RIGHT LOWER EXTREMITY ADVANCEMENT FLAP AND SPLIT THICKNESS SKIN GRAFT PLACEMENT; (WOUND- 10 CM X 5.5 CM; CLOSURE- 6 CM X 5.5 CM; SKIN GRAFT- 7.2 CM X 5 CM) performed by Maninder Yoo MD at [address] N/A 2020    [address] [facility] [address] performed by Carol Ann Royal MD at [address] [facility] N/A 2020    EGD BIOPSY performed by Carol Ann Royal MD at [place] [address]       Immunization History:   Immunization History   Administered Date(s) Administered    COVID-19, Aldona Raid, Primary or Immunocompromised, PF, 100mcg/0.5mL 2021    Influenza A (R1K3-57) Vaccine PF IM 12/10/2009    Influenza Vaccine, unspecified formulation 2012, 2014, 10/13/2015, 2016, 2017, 10/26/2018, 2019    Influenza Virus Vaccine 12/19/2005, 12/14/2006, 12/13/2012, 11/30/2013, 10/13/2015, 09/20/2019    Influenza Whole 10/01/2007, 09/02/2010, 09/01/2011, 12/13/2012, 10/13/2015    Influenza, High Dose (Fluzone 65 yrs and older) 11/06/2014, 10/20/2020    PPD Test 09/16/1997    Pneumococcal Conjugate 13-valent ([id number]) 12/29/2014    Pneumococcal Conjugate 7-valent (Prevnar7) 12/29/2014    Pneumococcal Polysaccharide (Nmbblydtx51) 01/27/2014       Active Problems:  Patient Active Problem List   Diagnosis Code    Hypotension I95.9    Light headed R42    Cellulitis L03.90    Essential hypertension I10    GERD (gastroesophageal reflux disease) K21.9    Acute blood loss anemia D62    Tinea corporis B35.4    Carotid stenosis I65.29    CAD (coronary artery disease) I25.10    S/P CABG x 5 Z95.1    Lower GI bleeding K92.2    Hip fracture, right ([facility]) S72.001A    Acute right hip pain M25.551    Acute low back pain without sciatica M54.50    Hypothermia T68. [facility]    Complicated UTI (urinary tract infection) N39.0    Edema R60.9    Problem with urinary catheter ([facility] [address] T83. 9XXA    Acute encephalopathy G93.40    Chronic indwelling Iglesias catheter Z97.8    Hyperlipidemia E78.5    Bilateral lower leg cellulitis L03.116, L03.115    Neurogenic bladder N31.9    Bacteriuria R82.71    Abnormality of urethral meatus Q64.70    Gross hematuria R31.0    CHF with unknown LVEF ([facility]) I50.9    Dyspnea R06.00    Bradycardia R00.1    Pseudomonas infection A49.8    Acute renal injury ([facility] [address] N17.9    Cellulitis of scrotum N49.2    Constipation K59.00    Encopresis with constipation and overflow incontinence R15.9    Abnormal stress test R94.39    H/O angiography Z92.89    Abnormal angiogram R93.89    Acute cystitis with hematuria N30.01    Acute renal failure superimposed on stage 3 chronic kidney disease ([facility]) N17.9, N18.30    Urinary tract infection associated with indwelling urethral catheter ([facility]) T83.511A, N39.0    Encephalopathy acute G93.40    Altered mental status R41.82    Hyperkalemia E87.5    Leg laceration, unspecified laterality, initial encounter S81.819A    Pain of right lower extremity M79.604    Degloving injury T14. 8XXA       Isolation/Infection:   Isolation            No Isolation          Patient Infection Status       Infection Onset Added Last Indicated Last Indicated By Review Planned Expiration Resolved Resolved By    None active    Resolved    MDRO (multi-drug resistant organism) 06/05/21 06/08/21 06/05/21 Culture, Urine   09/27/21 Hiram Correa RN    New urine cx on 6/20/2021 with no growth/was negative    COVID-19 Rule Out 04/24/21 04/24/21 04/24/21 COVID-19, Rapid (Ordered)   04/24/21 Rule-Out Test Resulted    C-diff Rule Out 04/21/21 04/21/21 04/22/21 GI Bacterial Pathogens By PCR (Ordered)   04/22/21 Nabeel Carmen RN    Order noted to be discontinued by MD; not included in GI pathogens order    COVID-19 Rule Out 02/19/21 02/19/21 02/19/21 COVID-19, Rapid (Ordered)   02/19/21 Rule-Out Test Resulted    C-diff Rule Out 12/13/20 12/13/20 12/13/20 Clostridium difficile toxin/antigen (Ordered)   02/16/21 Gabriela Nicolas RN    No stool ever sent.      COVID-19 Rule Out 06/10/20 06/10/20 06/10/20 COVID-19 (Ordered)   06/10/20 Rule-Out Test Resulted    C-diff Rule Out 06/09/20 06/09/20 06/09/20 GI Bacterial Pathogens By PCR (Ordered)   06/10/20 Rule-Out Test Resulted    C-diff Rule Out 06/09/20 06/09/20 06/09/20 Clostridium difficile toxin/antigen (Ordered)   06/09/20 Rule-Out Test Resulted    C-diff Rule Out 05/03/20 05/04/20 05/04/20 C. difficile toxin Molecular (Ordered)   05/05/20 Rule-Out Test Resulted    MDRO (multi-drug resistant organism) 05/02/20 05/04/20 05/02/20 Culture, Urine   04/22/21 Carrolyn Coffee Post, RN    Has had new urine culture on 4/17/21 with mixed anne; no MDRO noted    COVID-19 Rule Out 05/03/20 05/03/20 05/03/20 COVID-19 (Ordered)   05/03/20 Rule-Out Test Resulted C-diff Rule Out 05/02/20 05/02/20 05/03/20 Clostridium difficile toxin/antigen (Ordered)   05/03/20 Rule-Out Test Resulted    MRSA 02/07/20 02/08/20 02/07/20 MRSA DNA Probe, Nasal   05/04/20 Mynor Pennington RN    MDRO (multi-drug resistant organism) 10/26/19 10/28/19 11/19/19 Urine culture   01/16/20 Mynor Pennington RN            Nurse Assessment:  Last Vital Signs: /66   Pulse 68   Temp 97.4 °F (36.3 °C) (Oral)   Resp 16   Ht 5' 11\" (1.803 m)   Wt 194 lb (88 kg)   SpO2 97%   BMI 27.06 kg/m²     Last documented pain score (0-10 scale): Pain Level: 0  Last Weight:   Wt Readings from Last 1 Encounters:   10/31/21 194 lb (88 kg)     Mental Status:  oriented and alert    IV Access:  - None    Nursing Mobility/ADLs:  Walking   Dependent  Transfer  Dependent  Bathing  Dependent  Dressing  Dependent  Toileting  Dependent  Feeding  Assisted  Med Admin  Assisted  Med Delivery   whole    Wound Care Documentation and Therapy:  Negative Pressure Wound Therapy Leg Anterior; Lower;Right (Active)   Wound Type Flap 11/02/21 1536   Unit Type VAC ultra  11/02/21 1536   Dressing Type Other (Comment) 11/02/21 1041   Cycle Continuous 11/02/21 1536   Target Pressure (mmHg) 125 11/02/21 1536   Number of days: 0       Wound 11/01/21 Buttocks Right;Left redness, open areas, stage 2 ulcer (Active)   Wound Etiology Pressure Stage  2 11/02/21 1536   Dressing Status Clean;Dry; Intact 11/02/21 1536   Wound Cleansed Not Cleansed 11/02/21 0108   Dressing/Treatment Other (comment) 11/02/21 0108   Drainage Amount Small 11/02/21 1041   Drainage Description Serous 11/02/21 1041   Odor Mild 11/02/21 1041   Usha-wound Assessment Fragile; Non-blanchable erythema 11/02/21 1041   Number of days: 1        Elimination:  Continence:   · Bowel: No  · Bladder: Yes  Urinary Catheter: Chronic schneider   Colostomy/Ileostomy/Ileal Conduit: No       Date of Last BM: 11/3/21    Intake/Output Summary (Last 24 hours) at 11/2/2021 4102  Last data filed at 11/2/2021 1032  Gross per 24 hour   Intake 650 ml   Output 900 ml   Net -250 ml     I/O last 3 completed shifts: In: 650 [I.V.:650]  Out: 900 [Urine:900]    Safety Concerns: At Risk for Falls    Impairments/Disabilities:      wheelchair bound    Nutrition Therapy:  Current Nutrition Therapy:   - Oral Diet:  General    Routes of Feeding: Oral  Liquids: No Restrictions  Daily Fluid Restriction: no  Last Modified Barium Swallow with Video (Video Swallowing Test): not done    Treatments at the Time of Hospital Discharge:   Respiratory Treatments:   Oxygen Therapy:  is not on home oxygen therapy. Ventilator:    - No ventilator support    Rehab Therapies: Physical Therapy and Occupational Therapy  Weight Bearing Status/Restrictions: No weight bearing restirctions  Other Medical Equipment (for information only, NOT a DME order):  wheelchair  Other Treatments:     Patient's personal belongings (please select all that are sent with patient):  None    RN SIGNATURE:  Electronically signed by Beatrice Garland RN on 11/4/21 at 11:59 AM EDT    CASE MANAGEMENT/SOCIAL WORK SECTION    Inpatient Status Date: ***    Readmission Risk Assessment Score:  Readmission Risk              Risk of Unplanned Readmission:  30           Discharging to Facility/ Agency   · Name:   · Address:  · Phone:  · Fax:    Dialysis Facility (if applicable)   · Name:  · Address:  · Dialysis Schedule:  · Phone:  · Fax:    / signature: {Esignature:[id number]:::0}    PHYSICIAN SECTION    Prognosis: Good    Condition at Discharge: Stable    Rehab Potential (if transferring to Rehab): Good    Recommended Labs or Other Treatments After Discharge: home care    Physician Certification: I certify the above information and transfer of Killian Perkins  is necessary for the continuing treatment of the diagnosis listed and that he requires Home Care for greater 30 days.      Update Admission H&P: No change in H&P    PHYSICIAN SIGNATURE: Electronically signed by Yenny Elise MD on 11/2/21 at 4:03 PM EDT

## 2021-11-03 LAB
ABO/RH: NORMAL
ALBUMIN SERPL-MCNC: 3.3 G/DL (ref 3.4–5)
ANION GAP SERPL CALCULATED.3IONS-SCNC: 9 MMOL/L (ref 3–16)
ANTIBODY SCREEN: NORMAL
BASOPHILS ABSOLUTE: 0 K/UL (ref 0–0.2)
BASOPHILS RELATIVE PERCENT: 0.3 %
BLOOD BANK DISPENSE STATUS: NORMAL
BLOOD BANK PRODUCT CODE: NORMAL
BPU ID: NORMAL
BUN BLDV-MCNC: 38 MG/DL (ref 7–20)
CALCIUM SERPL-MCNC: 8 MG/DL (ref 8.3–10.6)
CHLORIDE BLD-SCNC: 104 MMOL/L (ref 99–110)
CO2: 25 MMOL/L (ref 21–32)
CREAT SERPL-MCNC: 1.5 MG/DL (ref 0.8–1.3)
DESCRIPTION BLOOD BANK: NORMAL
EOSINOPHILS ABSOLUTE: 0 K/UL (ref 0–0.6)
EOSINOPHILS RELATIVE PERCENT: 0 %
GFR AFRICAN AMERICAN: 54
GFR NON-AFRICAN AMERICAN: 45
GLUCOSE BLD-MCNC: 114 MG/DL (ref 70–99)
HCT VFR BLD CALC: 20.6 % (ref 40.5–52.5)
HCT VFR BLD CALC: 22.5 % (ref 40.5–52.5)
HEMOGLOBIN: 6.8 G/DL (ref 13.5–17.5)
HEMOGLOBIN: 7.5 G/DL (ref 13.5–17.5)
LYMPHOCYTES ABSOLUTE: 1 K/UL (ref 1–5.1)
LYMPHOCYTES RELATIVE PERCENT: 8.8 %
MAGNESIUM: 2.5 MG/DL (ref 1.8–2.4)
MCH RBC QN AUTO: 30.7 PG (ref 26–34)
MCHC RBC AUTO-ENTMCNC: 32.9 G/DL (ref 31–36)
MCV RBC AUTO: 93.2 FL (ref 80–100)
MONOCYTES ABSOLUTE: 0.6 K/UL (ref 0–1.3)
MONOCYTES RELATIVE PERCENT: 5.8 %
NEUTROPHILS ABSOLUTE: 9.4 K/UL (ref 1.7–7.7)
NEUTROPHILS RELATIVE PERCENT: 85.1 %
PDW BLD-RTO: 17.2 % (ref 12.4–15.4)
PHOSPHORUS: 3.9 MG/DL (ref 2.5–4.9)
PLATELET # BLD: 101 K/UL (ref 135–450)
PMV BLD AUTO: 9.4 FL (ref 5–10.5)
POTASSIUM SERPL-SCNC: 5.6 MMOL/L (ref 3.5–5.1)
RBC # BLD: 2.21 M/UL (ref 4.2–5.9)
SODIUM BLD-SCNC: 138 MMOL/L (ref 136–145)
WBC # BLD: 11.1 K/UL (ref 4–11)

## 2021-11-03 PROCEDURE — 97162 PT EVAL MOD COMPLEX 30 MIN: CPT

## 2021-11-03 PROCEDURE — 97530 THERAPEUTIC ACTIVITIES: CPT

## 2021-11-03 PROCEDURE — 99024 POSTOP FOLLOW-UP VISIT: CPT | Performed by: SURGERY

## 2021-11-03 PROCEDURE — 85018 HEMOGLOBIN: CPT

## 2021-11-03 PROCEDURE — 6360000002 HC RX W HCPCS: Performed by: STUDENT IN AN ORGANIZED HEALTH CARE EDUCATION/TRAINING PROGRAM

## 2021-11-03 PROCEDURE — 80069 RENAL FUNCTION PANEL: CPT

## 2021-11-03 PROCEDURE — 86900 BLOOD TYPING SEROLOGIC ABO: CPT

## 2021-11-03 PROCEDURE — 86850 RBC ANTIBODY SCREEN: CPT

## 2021-11-03 PROCEDURE — P9016 RBC LEUKOCYTES REDUCED: HCPCS

## 2021-11-03 PROCEDURE — 85025 COMPLETE CBC W/AUTO DIFF WBC: CPT

## 2021-11-03 PROCEDURE — 86923 COMPATIBILITY TEST ELECTRIC: CPT

## 2021-11-03 PROCEDURE — 36415 COLL VENOUS BLD VENIPUNCTURE: CPT

## 2021-11-03 PROCEDURE — 85014 HEMATOCRIT: CPT

## 2021-11-03 PROCEDURE — 1200000000 HC SEMI PRIVATE

## 2021-11-03 PROCEDURE — 6370000000 HC RX 637 (ALT 250 FOR IP): Performed by: STUDENT IN AN ORGANIZED HEALTH CARE EDUCATION/TRAINING PROGRAM

## 2021-11-03 PROCEDURE — 83735 ASSAY OF MAGNESIUM: CPT

## 2021-11-03 PROCEDURE — 2580000003 HC RX 258: Performed by: STUDENT IN AN ORGANIZED HEALTH CARE EDUCATION/TRAINING PROGRAM

## 2021-11-03 PROCEDURE — 36430 TRANSFUSION BLD/BLD COMPNT: CPT

## 2021-11-03 PROCEDURE — 86901 BLOOD TYPING SEROLOGIC RH(D): CPT

## 2021-11-03 PROCEDURE — 97167 OT EVAL HIGH COMPLEX 60 MIN: CPT

## 2021-11-03 PROCEDURE — 97535 SELF CARE MNGMENT TRAINING: CPT

## 2021-11-03 RX ORDER — SODIUM CHLORIDE 9 MG/ML
INJECTION, SOLUTION INTRAVENOUS PRN
Status: DISCONTINUED | OUTPATIENT
Start: 2021-11-03 | End: 2021-11-04 | Stop reason: HOSPADM

## 2021-11-03 RX ADMIN — ATORVASTATIN CALCIUM 80 MG: 80 TABLET, FILM COATED ORAL at 20:22

## 2021-11-03 RX ADMIN — ENOXAPARIN SODIUM 40 MG: 100 INJECTION SUBCUTANEOUS at 08:15

## 2021-11-03 RX ADMIN — OXYCODONE 5 MG: 5 TABLET ORAL at 08:15

## 2021-11-03 RX ADMIN — AMLODIPINE BESYLATE 5 MG: 5 TABLET ORAL at 08:15

## 2021-11-03 RX ADMIN — SODIUM CHLORIDE, PRESERVATIVE FREE 10 ML: 5 INJECTION INTRAVENOUS at 20:22

## 2021-11-03 RX ADMIN — OXYCODONE 5 MG: 5 TABLET ORAL at 20:22

## 2021-11-03 RX ADMIN — SODIUM CHLORIDE, PRESERVATIVE FREE 5 ML: 5 INJECTION INTRAVENOUS at 09:26

## 2021-11-03 ASSESSMENT — PAIN DESCRIPTION - LOCATION
LOCATION: BACK

## 2021-11-03 ASSESSMENT — PAIN DESCRIPTION - ORIENTATION: ORIENTATION: LOWER

## 2021-11-03 ASSESSMENT — PAIN SCALES - GENERAL
PAINLEVEL_OUTOF10: 4
PAINLEVEL_OUTOF10: 7
PAINLEVEL_OUTOF10: 4
PAINLEVEL_OUTOF10: 7
PAINLEVEL_OUTOF10: 4
PAINLEVEL_OUTOF10: 4

## 2021-11-03 ASSESSMENT — PAIN DESCRIPTION - PAIN TYPE: TYPE: ACUTE PAIN

## 2021-11-03 NOTE — PROGRESS NOTES
Occupational Therapy   Occupational Therapy Initial Assessment/tx  Date: 11/3/2021   Patient Name: Maico Peres  MRN: [id number]     : 1940    Date of Service: 11/3/2021    Discharge Recommendations:  Maico Peres scored a 13/24 on the AM-PAC ADL Inpatient form. Current research shows that an AM-PAC score of 17 or less is typically not associated with a discharge to the patient's home setting. Based on the patient's AM-PAC score and their current ADL deficits, it is recommended that the patient have 3-5 sessions per week of Occupational Therapy at d/c to increase the patient's independence. Please see assessment section for further patient specific details. If patient discharges prior to next session this note will serve as a discharge summary. Please see below for the latest assessment towards goals. OT Equipment Recommendations  Other: defer to SNF    Assessment   Performance deficits / Impairments: Decreased functional mobility ; Decreased ADL status; Decreased ROM; Decreased endurance  Assessment: Pt is functioning significantly below baseline level. He is dep of 2 for squat pivot transfer, max A for bed mobility, dep with ADLs. Prior to admission, pt resided alone, was nonambulatory but indep with pivot transfers and ADLs from w/c level. Recommend ongoing inpatient OT at discharge to increase functional status. Treatment Diagnosis: impaired ADLs and mobility  Prognosis: Good  Decision Making: High Complexity  OT Education: OT Role;Transfer Training  Patient Education: needs practice  REQUIRES OT FOLLOW UP: Yes  Activity Tolerance  Activity Tolerance: Patient limited by fatigue  Safety Devices  Safety Devices in place: Yes  Type of devices: Nurse notified;Call light within reach; Bed alarm in place           Patient Diagnosis(es): The primary encounter diagnosis was Pain of right lower extremity.  Diagnoses of Degloving injury, History of neurogenic bladder, and Leg laceration, unspecified laterality, initial encounter were also pertinent to this visit. has a past medical history of BPH (benign prostatic hyperplasia), Carotid stenosis, Cellulitis, Chronic back pain, Diverticular disease, Erectile dysfunction, GERD (gastroesophageal reflux disease), History of atrial fibrillation, Hypercholesteremia, Hypertension, Lower GI bleed, MDRO (multiple drug resistant organisms) resistance, Neuromuscular disorder ([address] Renal insufficiency, Risk for falls, Tinea corporis, and Vitamin B12 deficiency. 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 Treatment Diagnosis: impaired ADLs and mobility      Restrictions  Position Activity Restriction  Other position/activity restrictions: Up with assistance    Subjective   General  Chart Reviewed: Yes  Additional Pertinent Hx: PMH:  hypercholesteremia, HTN, BPH, caroted stenosis, chronic back pain, GERD, A fib, R hip surgery, catheter dependent  Family / Caregiver Present: No  Referring Practitioner: Dr. Mary Chaidez  Diagnosis: Pt admitted with laceration R LE, dx of degloving injury to OR 11/2:  advancement flap R LE, split thickness skin graft, wound vac application-CXR=improvement with atelectasis  Subjective  Subjective: Pt in bed, agreeable to work with OT.   Patient Currently in Pain: Yes (LBP)  Pain Assessment  Pain Assessment: 0-10  Pain Level: 4  Pain Location: Back (lower)  Vital Signs  Level of Consciousness: Alert (0)  Patient Currently in Pain: Yes (LBP)  Social/Functional History  Social/Functional History  Lives With: Alone  Type of Home: House  Home Layout: Two level, Able to Live on Main level with bedroom/bathroom, Laundry in basement  Home Access: Ramped entrance  Bathroom Shower/Tub:  (Pt sponge bathes in w/c)  Bathroom Equipment:  (Aid empties)  Home Equipment: [employer]  ADL Assistance: Independent (Dresses self and sponge bathes self)  Homemaking Assistance: Needs assistance (Daughter does laundry, pt microwaves, pt cleans)  Ambulation Assistance: Independent (With w/c)  Active : No  Patient's  Info: Transportation service  Additional Comments: Denies falls. Pt was receiving home SN and home PT.        Objective        Orientation  Overall Orientation Status: Within Functional Limits (per conversation-became irritated when initially asking orientation questions)     Toilet Transfers  Toilet - Technique: Squat pivot (dep of 2)  Equipment Used:  (simulated 3 in1 commode)  Toilet Transfers Comments: dep of 2, squat pivot transfer hospital bed .,simulated 3 in 1 commode  ADL  UE Dressing: Maximum assistance (changing hospital gown)  LE Dressing: Dependent/Total (donning socks)  Toileting: Dependent/Total (incontinent of stool, dep of 2  for clean up and donning adult diaper)        Bed mobility  Rolling to Left: Maximum assistance (cues, use of bed rail)  Rolling to Right: Maximum assistance (cues, use of bed rail)  Supine to Sit: 2 Person assistance (Aditya of 1, mod A of 1)  Sit to Supine: 2 Person assistance (mod A with trunk, dep with LEs)  Scooting:  (dep of 2 scooting up in bed while supine)        Cognition  Overall Cognitive Status: WFL                 LUE AROM (degrees)  LUE General AROM: ~90 shoulder flex, elbow to hand=WFL  Left Hand AROM (degrees)  Left Hand AROM: WFL  RUE AROM (degrees)  RUE General AROM: ~90 shoulder flex, elbow to hand =WFL  Right Hand AROM (degrees)  Right Hand AROM: WFL        Hand Dominance  Hand Dominance: Right     Patient participated in OT eval and tx including ADLs and transfer        Plan   Plan  Times per week: 2-5  Current Treatment Recommendations: Balance Training, Functional Mobility Training, Endurance Training, Self-Care / ADL    G-Code     OutComes Score                                                  AM-PAC Score        AM-PAC Inpatient Daily Activity Raw Score: 13 (11/03/21 1041)  AM-PAC Inpatient ADL T-Scale Score : 32.03 (11/03/21 1041)  ADL Inpatient CMS 0-100% Score: 63.03 (11/03/21 1041)  ADL Inpatient CMS G-Code Modifier : CL (11/03/21 1041)    Goals  Short term goals  Time Frame for Short term goals: discharge  Short term goal 1: pt to do pivot transfer with mod A of 2 vs use of oral stedy and A of 2  Short term goal 2: pt to do simple grooming/hygiene tasks indep after set up  Short term goal 3: pt to tolerate 10 reps B UE AROM exerc to increase activity tolerance  Patient Goals   Patient goals : not stated       Therapy Time   Individual Concurrent Group Co-treatment   Time In 0930         Time Out 1025         Minutes 55              Timed Code Treatment Minutes:   40    Total Treatment Minutes:  [address] [facility], OTR/L [address]

## 2021-11-03 NOTE — PROGRESS NOTES
A/Ox4. VSS. H&H dropped this morning. 1 unit PRBC given. Recheck increased to 7.5. Pt worked with PT/OT. Max of 2 assist for all activity. Q2 turns throughout the shift. Dressing to donor site to R thigh leaking bright red blood. Reinforced with tegaderm. Daron Gilliam NP made aware who stated to keep tegaderm in place until tomorrow. If continues to leak then remove tegaderm and keep xeroform in place. Precert started for [employer]. All needs met at this time.

## 2021-11-03 NOTE — PROGRESS NOTES
Physical Therapy    Facility/Department: [facility]  Initial Assessment and Treatment    NAME: Anatoly Levine  : 1940  MRN: [id number]    Date of Service: 11/3/2021    Discharge Recommendations:    Anatoly Levine scored a  10/24 on the AM-PAC short mobility form. Current research shows that an AM-PAC score of 17 or less is typically not associated with a discharge to the patient's home setting. Based on the patient's AM-PAC score and their current functional mobility deficits, it is recommended that the patient have 3-5 sessions per week of Physical Therapy at d/c to increase the patient's independence. Please see assessment section for further patient specific details. If patient discharges prior to next session this note will serve as a discharge summary. Please see below for the latest assessment towards goals. PT Equipment Recommendations  Equipment Needed: No    Assessment   Assessment: Pt is [de-identified] yo M with decreased functional mobility, requiring heavy assist of 2 for all activity. Pt presents as a fall risk. Pt typically lives alone with homecare, and now functioning well below his baseline. Rec continued inpt PT at d/c prior to return home alone. Will follow. Treatment Diagnosis: Decreased functional mobility  Decision Making: Medium Complexity  PT Education: PT Role;General Safety  Patient Education: Pt verbalized understanding  REQUIRES PT FOLLOW UP: Yes  Activity Tolerance  Activity Tolerance: Patient limited by pain; Patient limited by fatigue       Patient Diagnosis(es): The primary encounter diagnosis was Pain of right lower extremity. Diagnoses of Degloving injury, History of neurogenic bladder, and Leg laceration, unspecified laterality, initial encounter were also pertinent to this visit.      has a past medical history of BPH (benign prostatic hyperplasia), Carotid stenosis, Cellulitis, Chronic back pain, Diverticular disease, Erectile dysfunction, GERD (gastroesophageal reflux disease), History of atrial fibrillation, Hypercholesteremia, Hypertension, Lower GI bleed, MDRO (multiple drug resistant organisms) resistance, Neuromuscular disorder ([address] Renal insufficiency, Risk for falls, Tinea corporis, and Vitamin B12 deficiency. 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 Restrictions  Position Activity Restriction  Other position/activity restrictions: Up with assistance     Vision/Hearing  Vision: Within Functional Limits  Hearing: Within functional limits       Subjective  General  Chart Reviewed: Yes  Additional Pertinent Hx: Pt admitted with laceration R LE, dx of degloving injury to OR 11/2:  advancement flap R LE, split thickness skin graft, wound vac application-CXR=improvement with atelectasis.   PMH:  hypercholesteremia, HTN, BPH, caroted stenosis, chronic back pain, GERD, A fib, R hip surgery, catheter dependent  Family / Caregiver Present: No  Referring Practitioner: Ren Johnston  Diagnosis: Degloving injury  Subjective  Subjective: Pt supine in bed on air mattress and agreeable to PT  Pain Screening  Patient Currently in Pain: Yes  Pain Level: 4  Vital Signs  Patient Currently in Pain: Yes (LBP)    Orientation  Orientation  Overall Orientation Status: Within Functional Limits (To conversation)     Social/Functional History  Social/Functional History  Lives With: Alone  Type of Home: House  Home Layout: Two level, Able to Live on Main level with bedroom/bathroom, Laundry in basement  Home Access: Ramped entrance  Bathroom Shower/Tub:  (Pt sponge bathes in w/c)  Bathroom Equipment:  (Aid empties)  Home Equipment: [employer]  ADL Assistance: Independent (Dresses self and sponge bathes self)  Homemaking Assistance: Needs assistance (Daughter does laundry, pt microwaves, pt cleans)  Ambulation Assistance: Independent (With w/c)  Active : No  Patient's  Info: Transportation service  Additional Comments: Denies falls. Pt was receiving home SN and home PT. Objective    AROM RLE (degrees)  RLE AROM: WFL  RLE General AROM: Hip 0-90, knee 0-90, ankle WFL  AROM LLE (degrees)  LLE AROM : WFL  LLE General AROM: Hip 0-90, knee 0-90, ankle WFL     Strength RLE  Comment: 3/5  Strength RUE  Comment: 3/5     Bed mobility  Rolling to Left: Maximum assistance  Rolling to Right: Maximum assistance  Supine to Sit: 2 Person assistance;Minimal assistance; Moderate assistance  Sit to Supine: 2 Person assistance (Mod assist for trunk and dependent for LEs)  Scooting: Dependent/Total;2 Person assistance (While scooting up in bed)     Transfers  Bed to Chair: Dependent/Total;2 Person Assistance  Squat Pivot Transfers: 2 Person Assistance;Dependent/Total  Comment: Wound vac in place on R LE shin     Balance  Sitting - Static: Fair  Comments: Pt sits EOB with SBA     Exercises  Comments: Pt received dependent pericare in sidelying and sacral dressing change by nursing     Treatment:  Functional mobility training and pt education    Plan   Plan  Times per week: 2-5  Current Treatment Recommendations: Strengthening, Functional Mobility Training, Balance Training, Transfer Training, Safety Education & Training  Safety Devices  Type of devices: Call light within reach, Left in bed, Bed alarm in place, Nurse notified    Goals  Short term goals  Time Frame for Short term goals: by discharge  Short term goal 1: Pt will perform bed mobility with mod assist  Short term goal 2: Pt will transfer bed to and from chair with mod assist of 2  Short term goal 3: Pt will sit EOB while performing B LE exercises with supervision    Therapy Time   Individual Concurrent Group Co-treatment   Time In 0930         Time Out 1025         Minutes 55           Timed

## 2021-11-03 NOTE — PLAN OF CARE
Problem: Pain:  Goal: Pain level will decrease  Description: Pain level will decrease  11/3/2021 1023 by Albino Trotter RN  Outcome: Ongoing  11/3/2021 0426 by Miroslava Alvarado RN  Outcome: Ongoing  Goal: Control of acute pain  Description: Control of acute pain  11/3/2021 1023 by Albino Trotter RN  Outcome: Ongoing  11/3/2021 0426 by Miroslava Alvarado RN  Outcome: Ongoing  Goal: Control of chronic pain  Description: Control of chronic pain  11/3/2021 1023 by Albino Trotter RN  Outcome: Ongoing  11/3/2021 0426 by Miroslava Alvarado RN  Outcome: Ongoing

## 2021-11-03 NOTE — FLOWSHEET NOTE
11/03/21 1516   Encounter Summary   Services provided to: Patient   Referral/Consult From: Rounding   Continue Visiting   (11/3, grace )   Complexity of Encounter Low   Length of Encounter 15 minutes   Routine   Type Initial   Staff Krzysztof MA

## 2021-11-03 NOTE — PLAN OF CARE
Problem: Falls - Risk of:  Goal: Will remain free from falls  Description: Will remain free from falls  Outcome: Ongoing  Goal: Absence of physical injury  Description: Absence of physical injury  Outcome: Ongoing     Problem: Skin Integrity:  Goal: Will show no infection signs and symptoms  Description: Will show no infection signs and symptoms  Outcome: Ongoing  Goal: Absence of new skin breakdown  Description: Absence of new skin breakdown  Outcome: Ongoing     Problem: OXYGENATION/RESPIRATORY FUNCTION  Goal: Patient will maintain patent airway  Outcome: Ongoing  Goal: Patient will achieve/maintain normal respiratory rate/effort  Description: Respiratory rate and effort will be within normal limits for the patient  Outcome: Ongoing     Problem: HEMODYNAMIC STATUS  Goal: Patient has stable vital signs and fluid balance  Outcome: Ongoing     Problem: FLUID AND ELECTROLYTE IMBALANCE  Goal: Fluid and electrolyte balance are achieved/maintained  Outcome: Ongoing     Problem: ACTIVITY INTOLERANCE/IMPAIRED MOBILITY  Goal: Mobility/activity is maintained at optimum level for patient  Outcome: Ongoing     Problem: Pain:  Description: Pain management should include both nonpharmacologic and pharmacologic interventions.   Goal: Pain level will decrease  Description: Pain level will decrease  Outcome: Ongoing  Goal: Control of acute pain  Description: Control of acute pain  Outcome: Ongoing  Goal: Control of chronic pain  Description: Control of chronic pain  Outcome: Ongoing

## 2021-11-03 NOTE — PLAN OF CARE
Problem: Falls - Risk of:  Goal: Will remain free from falls  Description: Will remain free from falls  11/3/2021 1933 by Francisco Kilgore RN  Outcome: Ongoing  11/3/2021 1023 by Jennifer Mauricio RN  Outcome: Ongoing  Goal: Absence of physical injury  Description: Absence of physical injury  11/3/2021 1933 by Francisco Kilgore RN  Outcome: Ongoing  11/3/2021 1023 by Jennifer Mauricio RN  Outcome: Ongoing     Problem: Skin Integrity:  Goal: Will show no infection signs and symptoms  Description: Will show no infection signs and symptoms  11/3/2021 1933 by Francisco Kilgore RN  Outcome: Ongoing  11/3/2021 1023 by Jennifer Mauricio RN  Outcome: Ongoing  Goal: Absence of new skin breakdown  Description: Absence of new skin breakdown  11/3/2021 1933 by Francisco Kilgore RN  Outcome: Ongoing  11/3/2021 1023 by Jennifer Mauricio RN  Outcome: Ongoing     Problem: OXYGENATION/RESPIRATORY FUNCTION  Goal: Patient will maintain patent airway  11/3/2021 1933 by Francisco Kilgore RN  Outcome: Ongoing  11/3/2021 1023 by Jennifer Mauricio RN  Outcome: Ongoing  Goal: Patient will achieve/maintain normal respiratory rate/effort  Description: Respiratory rate and effort will be within normal limits for the patient  11/3/2021 1933 by Francisco Kilgore RN  Outcome: Ongoing  11/3/2021 1023 by Jennifer Mauricio RN  Outcome: Ongoing     Problem: HEMODYNAMIC STATUS  Goal: Patient has stable vital signs and fluid balance  11/3/2021 1933 by Francisco Kilgore RN  Outcome: Ongoing  11/3/2021 1023 by Jennifer Mauricio RN  Outcome: Ongoing     Problem: FLUID AND ELECTROLYTE IMBALANCE  Goal: Fluid and electrolyte balance are achieved/maintained  11/3/2021 1933 by Francisco Kilgore RN  Outcome: Ongoing  11/3/2021 1023 by Jennifer Mauricio RN  Outcome: Ongoing     Problem: ACTIVITY INTOLERANCE/IMPAIRED MOBILITY  Goal: Mobility/activity is maintained at optimum level for patient  11/3/2021 1933 by Francisco Kilgore RN  Outcome: Ongoing  11/3/2021 1023 by Jennifer Mauricio RN  Outcome: Ongoing Problem: Pain:  Description: Pain management should include both nonpharmacologic and pharmacologic interventions. Goal: Pain level will decrease  Description: Pain level will decrease  11/3/2021 1933 by Rochelle Osborne RN  Outcome: Ongoing  11/3/2021 1023 by Kerri Hi RN  Outcome: Ongoing  Goal: Control of acute pain  Description: Control of acute pain  11/3/2021 1933 by Rochelle Osborne RN  Outcome: Ongoing  11/3/2021 1023 by Kerri Hi RN  Outcome: Ongoing  Goal: Control of chronic pain  Description: Control of chronic pain  11/3/2021 1933 by Rochelle Osborne RN  Outcome: Ongoing  11/3/2021 1023 by Kerri Hi RN  Outcome: Ongoing     Problem: Musculor/Skeletal Functional Status  Intervention: PT Evaluation/treatment  11/3/2021 1149 by Ghislaine Lopez, PT  Note: Pt to improve functional mobility by discharge   Intervention: OT Evaluation/treatment  11/3/2021 1100 by Sidra Rhodes OT  Note: . IPOC

## 2021-11-03 NOTE — PROGRESS NOTES
Surgery Daily Progress Note  Alva Block  CC:  RLE laceration      Subjective :No acute events overnight. Patient did require supplemental 02. Pain well controlled. Tolerating diet. Wound vac in place. Objective    Infusions:   sodium chloride          I/O:I/O last 3 completed shifts: In: 650 [I.V.:650]  Out: 525 [Urine:525]           Wt Readings from Last 1 Encounters:   10/31/21 194 lb (88 kg)                 LABS:    Recent Labs     11/01/21 0446 11/02/21  0430   WBC 6.0 7.6   HGB 8.7* 7.5*   HCT 26.0* 22.2*   MCV 94.7 93.0   * 106*        Recent Labs     11/01/21 0446 11/02/21  0430    137   K 4.3 4.7    102   CO2 25 25   PHOS  --  3.5   BUN 44* 39*   CREATININE 1.4* 1.3      No results for input(s): AST, ALT, ALB, BILIDIR, BILITOT, ALKPHOS in the last 72 hours. No results for input(s): LIPASE, AMYLASE in the last 72 hours. Recent Labs     10/31/21  1931   INR 0.97   APTT 42.3*      No results for input(s): CKTOTAL, CKMB, CKMBINDEX, TROPONINI in the last 72 hours. Exam:BP (!) 111/50   Pulse 70   Temp 97.9 °F (36.6 °C) (Oral)   Resp 14   Ht 5' 11\" (1.803 m)   Wt 194 lb (88 kg)   SpO2 91%   BMI 27.06 kg/m²   General appearance: alert, appears stated age and cooperative  Eyes: No scleral icterus, EOM grossly intact  Neck: trachea midline, no JVD, no lymphadenopathy, neck supple  Chest/Lungs: normal effort with no accessory muscle use on RA  Cardiovascular: RRR, brisk capillary refill distally  Abdomen: Soft, non-tender, non-distended, no rebound, guarding, or rigidity. Skin: warm and dry, no rashes  : Iglesias catheter in place with clear, yellow urine  Extremities: edema of left distal extremity with dry skin. RLE: ACE wrap over Kerlix over Prevena wound vac (holding adequate suction) in place without strike-through.  [place] site at right thigh with minimal bleeding beneath tegarderm, Xeroform intact   Neuro: A&Ox3, no focal deficits, sensation intact    ASSESSMENT/PLAN: Pt. is a [de-identified] y.o. male s/p right lower extremity advancement flap and split-thickness skin graft secondary to right lower extremity skin avulsion wound    - continue general diet  - continue to encourage OOB/Ambulation  - continue prevena wound vac to graft site. Continue tegaderm over donor site  - OK to discharge from plastic surgery standpoint when medically ready      Jennifer Gore, APRN - CNP 11/3/2021 6:15 AM  [phone]    I saw and independently examined the patient today. I agree with the history of present illness, past medical/surgical histories, family history, social history, medication list and allergies as listed. The review of systems is as noted above. My physical exam confirms the findings listed above. Review of labs, pathology reports, radiology reports and medical records confirm the findings noted above. I edited the note where appropriate in italics, strikethrough font, or underline. Doing well from surgical standpoint. Mag collins DC from [facility] standpoint and follow-up in 1 week.     Karla Spears MD  [address] [place] [address] [address] Reconstructive Surgery  [phone]  11/03/21

## 2021-11-03 NOTE — PROGRESS NOTES
Hospitalist Progress Note      PCP: INES BLANDON [id number] [facility] [address]    Date of Admission: 10/31/2021    Chief Complaint: Right leg laceration    Hospital Course: 43-year-old male presented to the hospital after injuring his right leg on the wheelchair sustaining a large laceration    Subjective: No acute events ported overnight. Patient feels okay this morning. Denies new complaints      Medications:  Reviewed    Infusion Medications    sodium chloride      sodium chloride       Scheduled Medications    enoxaparin  40 mg SubCUTAneous Daily    amLODIPine  5 mg Oral Daily    atorvastatin  80 mg Oral Nightly    sodium chloride flush  5-40 mL IntraVENous 2 times per day    influenza virus vaccine  0.5 mL IntraMUSCular Prior to discharge     PRN Meds: sodium chloride, melatonin, sodium chloride flush, sodium chloride, ondansetron **OR** ondansetron, polyethylene glycol, acetaminophen **OR** acetaminophen, oxyCODONE      Intake/Output Summary (Last 24 hours) at 11/3/2021 1208  Last data filed at 11/3/2021 1019  Gross per 24 hour   Intake --   Output 1025 ml   Net -1025 ml       Physical Exam Performed:    /66   Pulse 72   Temp 98.6 °F (37 °C) (Oral)   Resp 12   Ht 5' 11\" (1.803 m)   Wt 199 lb 4.7 oz (90.4 kg)   SpO2 92%   BMI 27.80 kg/m²     General appearance: No apparent distress, appears stated age and cooperative. HEENT: Pupils equal, round, and reactive to light. Conjunctivae/corneas clear. Neck: Supple, with full range of motion. No jugular venous distention. Trachea midline. Respiratory:  Normal respiratory effort. Clear to auscultation, bilaterally without Rales/Wheezes/Rhonchi. Cardiovascular: Regular rate and rhythm with normal S1/S2 without murmurs, rubs or gallops. Abdomen: Soft, non-tender, non-distended with normal bowel sounds.   Musculoskeletal: Right leg with dressings in place, large degloving skin tear located over the right thigh approximately 4 x 7 cm in size  Skin: Skin color, Regular  Code Status: Full Code    PT/OT Eval Status: Ongoing    Dispo -awaiting placement for discharge    Carine Long MD

## 2021-11-04 VITALS
HEART RATE: 69 BPM | BODY MASS INDEX: 27.9 KG/M2 | TEMPERATURE: 97.7 F | HEIGHT: 71 IN | OXYGEN SATURATION: 95 % | DIASTOLIC BLOOD PRESSURE: 76 MMHG | WEIGHT: 199.3 LBS | SYSTOLIC BLOOD PRESSURE: 146 MMHG | RESPIRATION RATE: 16 BRPM

## 2021-11-04 LAB
ALBUMIN SERPL-MCNC: 3.2 G/DL (ref 3.4–5)
ANION GAP SERPL CALCULATED.3IONS-SCNC: 9 MMOL/L (ref 3–16)
BASOPHILS ABSOLUTE: 0.1 K/UL (ref 0–0.2)
BASOPHILS RELATIVE PERCENT: 0.6 %
BUN BLDV-MCNC: 36 MG/DL (ref 7–20)
CALCIUM SERPL-MCNC: 8.2 MG/DL (ref 8.3–10.6)
CHLORIDE BLD-SCNC: 104 MMOL/L (ref 99–110)
CO2: 25 MMOL/L (ref 21–32)
CREAT SERPL-MCNC: 1.3 MG/DL (ref 0.8–1.3)
EOSINOPHILS ABSOLUTE: 0.2 K/UL (ref 0–0.6)
EOSINOPHILS RELATIVE PERCENT: 2.4 %
GFR AFRICAN AMERICAN: >60
GFR NON-AFRICAN AMERICAN: 53
GLUCOSE BLD-MCNC: 90 MG/DL (ref 70–99)
HCT VFR BLD CALC: 23.2 % (ref 40.5–52.5)
HEMOGLOBIN: 7.7 G/DL (ref 13.5–17.5)
LYMPHOCYTES ABSOLUTE: 1.9 K/UL (ref 1–5.1)
LYMPHOCYTES RELATIVE PERCENT: 19.8 %
MAGNESIUM: 2.6 MG/DL (ref 1.8–2.4)
MCH RBC QN AUTO: 31.3 PG (ref 26–34)
MCHC RBC AUTO-ENTMCNC: 33.4 G/DL (ref 31–36)
MCV RBC AUTO: 93.9 FL (ref 80–100)
MONOCYTES ABSOLUTE: 0.7 K/UL (ref 0–1.3)
MONOCYTES RELATIVE PERCENT: 7.4 %
NEUTROPHILS ABSOLUTE: 6.6 K/UL (ref 1.7–7.7)
NEUTROPHILS RELATIVE PERCENT: 69.8 %
PDW BLD-RTO: 16.8 % (ref 12.4–15.4)
PHOSPHORUS: 4 MG/DL (ref 2.5–4.9)
PLATELET # BLD: 129 K/UL (ref 135–450)
PMV BLD AUTO: 9 FL (ref 5–10.5)
POTASSIUM SERPL-SCNC: 5 MMOL/L (ref 3.5–5.1)
RBC # BLD: 2.47 M/UL (ref 4.2–5.9)
SODIUM BLD-SCNC: 138 MMOL/L (ref 136–145)
WBC # BLD: 9.5 K/UL (ref 4–11)

## 2021-11-04 PROCEDURE — 36415 COLL VENOUS BLD VENIPUNCTURE: CPT

## 2021-11-04 PROCEDURE — 6370000000 HC RX 637 (ALT 250 FOR IP): Performed by: STUDENT IN AN ORGANIZED HEALTH CARE EDUCATION/TRAINING PROGRAM

## 2021-11-04 PROCEDURE — 2580000003 HC RX 258: Performed by: STUDENT IN AN ORGANIZED HEALTH CARE EDUCATION/TRAINING PROGRAM

## 2021-11-04 PROCEDURE — 6360000002 HC RX W HCPCS: Performed by: STUDENT IN AN ORGANIZED HEALTH CARE EDUCATION/TRAINING PROGRAM

## 2021-11-04 PROCEDURE — 83735 ASSAY OF MAGNESIUM: CPT

## 2021-11-04 PROCEDURE — G0008 ADMIN INFLUENZA VIRUS VAC: HCPCS | Performed by: STUDENT IN AN ORGANIZED HEALTH CARE EDUCATION/TRAINING PROGRAM

## 2021-11-04 PROCEDURE — 80069 RENAL FUNCTION PANEL: CPT

## 2021-11-04 PROCEDURE — 85025 COMPLETE CBC W/AUTO DIFF WBC: CPT

## 2021-11-04 PROCEDURE — 90686 IIV4 VACC NO PRSV 0.5 ML IM: CPT | Performed by: STUDENT IN AN ORGANIZED HEALTH CARE EDUCATION/TRAINING PROGRAM

## 2021-11-04 RX ORDER — OXYCODONE HYDROCHLORIDE 5 MG/1
5 TABLET ORAL EVERY 6 HOURS PRN
Qty: 8 TABLET | Refills: 0 | Status: SHIPPED | OUTPATIENT
Start: 2021-11-04 | End: 2021-11-07

## 2021-11-04 RX ADMIN — AMLODIPINE BESYLATE 5 MG: 5 TABLET ORAL at 08:07

## 2021-11-04 RX ADMIN — ENOXAPARIN SODIUM 40 MG: 100 INJECTION SUBCUTANEOUS at 08:07

## 2021-11-04 RX ADMIN — INFLUENZA A VIRUS A/VICTORIA/2570/2019 IVR-215 (H1N1) ANTIGEN (PROPIOLACTONE INACTIVATED), INFLUENZA A VIRUS A/CAMBODIA/E0826360/2020 IVR-224 (H3N2) ANTIGEN (PROPIOLACTONE INACTIVATED), INFLUENZA B VIRUS B/VICTORIA/705/2018 BVR-11 ANTIGEN (PROPIOLACTONE INACTIVATED), INFLUENZA B VIRUS B/PHUKET/3073/2013 BVR-1B ANTIGEN (PROPIOLACTONE INACTIVATED) 0.5 ML: 15; 15; 15; 15 INJECTION, SUSPENSION INTRAMUSCULAR at 12:25

## 2021-11-04 RX ADMIN — SODIUM CHLORIDE, PRESERVATIVE FREE 5 ML: 5 INJECTION INTRAVENOUS at 08:08

## 2021-11-04 NOTE — CARE COORDINATION
Case Management Assessment           Daily Note                 Date/ Time of Note: 11/4/2021 9:52 AM         Note completed by: Rachel Barrera RN    Patient Name: Susi Graff  YOB: 1940    Diagnosis:Degloving injury Gae Nay. 8XXA]  Leg laceration, unspecified laterality, initial encounter [O76.488O]  History of neurogenic bladder [Z87.448]  Pain of right lower extremity [M79.604]  Patient Admission Status: Inpatient    Date of Admission:10/31/2021  6:51 PM Length of Stay: 4 GLOS: GMLOS: 2.7    Current Plan of Care: Prevena wound vac  ________________________________________________________________________________________  PT AM-PAC: 10 / 24 per last evaluation on: 11/03/2021    OT AM-PAC: 13 / 24 per last evaluation on: 11/03/2021    DME Needs for discharge: wheelchair, bath bench and reacher (patient already has at home)  ________________________________________________________________________________________  Discharge Plan: SNF: [facility]    Tentative discharge date: 11/04/2021    Current barriers to discharge: Precert    Referrals completed: SNF: [facility]    Resources/ information provided: [facility]  ________________________________________________________________________________________  Case Management Notes:Patient agrees to SNF placement at [facility], precert has been initiated. Contacted [facility] for precert update, approved for dates 11/3/2021 - 11/5/2021, approval number #[id number], Consultant Rajesh Lezama. Johnathan Perdue and his family were provided with choice of provider; he and his family are in agreement with the discharge plan.     Care Transition Patient: Yes    Rachel Barrera RN  [facility]  Case Management Department  Ph: [phone]

## 2021-11-04 NOTE — CARE COORDINATION
Case Management Assessment            Discharge Note                    Date / Time of Note: 11/4/2021 11:42 AM                  Discharge Note Completed by: Diane Harper RN    Patient Name: Crystal Mayorga   YOB: 1940  Diagnosis: Degloving injury [T14. 8XXA]  Leg laceration, unspecified laterality, initial encounter [S81.038R]  History of neurogenic bladder [Z87.448]  Pain of right lower extremity [Y15.307]   Date / Time: 10/31/2021  6:51 PM    [facility]: No    Hospitalization in the last 30 days: No    Advance Directives:  Code Status: Full Code  [place] DNR form completed and on chart: Not Indicated    Financial:  Payor: Tricia Mari / Plan: Virgil Sweeney / Product Type: *No Product type* /      Pharmacy:    [address] [place] [address]  [address] [facility] [address]  Phone: [phone] Fax: [phone]      Assistance purchasing medications?:    Assistance provided by Case Management: None at this time    Does patient want to participate in local refill/ meds to beds program?:      Meds To Beds General Rules:  1. Can ONLY be done Monday- Friday between 8:30am-5pm  2. Prescription(s) must be in pharmacy by 3pm to be filled same day  3. Copy of patient's insurance/ prescription drug card and patient face sheet must be sent along with the prescription(s)  4. Cost of Rx cannot be added to hospital bill. If financial assistance is needed, please contact unit  or ;  or  CANNOT provide pharmacy voucher for patients co-pays  5.  Patients can then  the prescription on their way out of the hospital at discharge, or pharmacy can deliver to the bedside if staff is available. (payment due at time of pick-up or delivery - cash, check, or card accepted)     Able to afford home medications/ co-pay costs: Yes    ADLS:  Current PT AM-PAC Score: 10 /24  Current OT AM-PAC Score: 13 /24      DISCHARGE Disposition: [facility] (SNF): [facility] Phone: [phone] Fax: [phone]    LOC at discharge: Skilled  Di Sweeney Completed: Yes    Notification completed in HENS/PAS?:  Yes : CM has completed HENS online through secure website for SNF admission at [facility]. Document ID #: [id number]    IMM Completed:   Yes, Case management has presented and reviewed IMM letter #2 to the patient and/or family/ POA. Patient and/or family/POA verbalized understanding of their medicare rights and appeal process if needed. Patient and/or family/POA has signed, initialed and placed today's date (11/04/2021) and time ([phone]) on IMM letter #2 on the the appropriate lines. Patient and/or family/POA, copy of letter offered and they are aware that this original copy of IMM letter #2 is available prior to discharge from the paper chart on the unit. Electronic documentation has been entered into epic for IMM letter #2 and original paper copy has been added to the paper chart at the nurses station.      Transportation:  Transportation PLAN for discharge: EMS transportation   Mode of Transport: Ambulance stretcher - BLS  Reason for medical transport: Severe muscular weakness and de-conditioned state due to wound and requires ambulance transport due to neuromuscular disorder, wheelchair bound at baseline  Name of [address]: [address]  Phone: [phone]  Time of Transport: 1330    Transport form completed: Not Indicated    Home Care:  [address] ordered at discharge: Not [address]: Not Applicable  Orders faxed: No    Durable Medical Equipment:  DME Provider: Na  Equipment obtained during hospitalization: NA    Home Oxygen and Respiratory Equipment:  Oxygen needed at discharge?: Not [address]: Not Applicable  Portable tank available for discharge?: Not Indicated    Dialysis:  Dialysis patient: No    Dialysis Center:  Not Applicable    Hospice Services:  Location: Not Applicable  Agency: Not Applicable    Consents signed: Not Indicated    Referrals made at [facility] for outpatient continued care:  Not Applicable    Additional CM Notes: Patient to be discharged to [facility], patient in agreement with discharge. Nurse to call report to [phone]. Patient full vaccinated, no COVID testing required. Transport scheduled for 1330 today. Precert #[id number]. The Plan for Transition of Care is related to the following treatment goals of Degloving injury [T14. 8XXA]  Leg laceration, unspecified laterality, initial encounter [S81.139A]  History of neurogenic bladder [Z87.448]  Pain of right lower extremity [M79.604]    The Patient and/or patient representative [place] Sweeney and his family were provided with a choice of provider and agrees with the discharge plan Not Indicated    Freedom of choice list was provided with basic dialogue that supports the patient's individualized plan of care/goals and shares the quality data associated with the providers.  Not Indicated    Care Transitions patient: No    Diana Worley RN  [facility]  Case Management Department  Ph: [phone]

## 2021-11-04 NOTE — PLAN OF CARE
Problem: Falls - Risk of:  Goal: Will remain free from falls  Description: Will remain free from falls  Outcome: Completed  Goal: Absence of physical injury  Description: Absence of physical injury  Outcome: Completed     Problem: Skin Integrity:  Goal: Will show no infection signs and symptoms  Description: Will show no infection signs and symptoms  Outcome: Completed  Goal: Absence of new skin breakdown  Description: Absence of new skin breakdown  Outcome: Completed     Problem: OXYGENATION/RESPIRATORY FUNCTION  Goal: Patient will maintain patent airway  Outcome: Completed  Goal: Patient will achieve/maintain normal respiratory rate/effort  Description: Respiratory rate and effort will be within normal limits for the patient  Outcome: Completed     Problem: HEMODYNAMIC STATUS  Goal: Patient has stable vital signs and fluid balance  Outcome: Completed     Problem: FLUID AND ELECTROLYTE IMBALANCE  Goal: Fluid and electrolyte balance are achieved/maintained  Outcome: Completed     Problem: ACTIVITY INTOLERANCE/IMPAIRED MOBILITY  Goal: Mobility/activity is maintained at optimum level for patient  Outcome: Completed     Problem: Pain:  Goal: Pain level will decrease  Description: Pain level will decrease  Outcome: Completed  Goal: Control of acute pain  Description: Control of acute pain  Outcome: Completed  Goal: Control of chronic pain  Description: Control of chronic pain  Outcome: Completed

## 2021-11-04 NOTE — PROGRESS NOTES
Patient being discharged to [facility]. Report called and given to Abdelrahman Stacy, all questions answered and callback number given in case questions arise. All belongings gathered and given to EMS staff. PIV removed. Script placed in white packet and packet given to EMS staff. Pt transferred over to stretcher and wheeled off unit with no issues.

## 2021-11-04 NOTE — DISCHARGE SUMMARY
Hospitalist Discharge Summary    Patient ID:  Monalisa Franks  [id number]  84 y.o.  1940    Admit date: 10/31/2021    Discharge date: 11/4/2021    Disposition: SNF    Admission Diagnoses:   Patient Active Problem List   Diagnosis    Hypotension    Light headed    Cellulitis    Essential hypertension    GERD (gastroesophageal reflux disease)    Acute blood loss anemia    Tinea corporis    Carotid stenosis    CAD (coronary artery disease)    S/P CABG x 5    Lower GI bleeding    Hip fracture, right (HCC)    Acute right hip pain    Acute low back pain without sciatica    Hypothermia    Complicated UTI (urinary tract infection)    Edema    Problem with urinary catheter (HCC)    Acute encephalopathy    Chronic indwelling Iglesias catheter    Hyperlipidemia    Bilateral lower leg cellulitis    Neurogenic bladder    Bacteriuria    Abnormality of urethral meatus    Gross hematuria    CHF with unknown LVEF ([facility])    Dyspnea    Bradycardia    Pseudomonas infection    Acute renal injury ([facility] [address]    Cellulitis of scrotum    Constipation    Encopresis with constipation and overflow incontinence    Abnormal stress test    H/O angiography    Abnormal angiogram    Acute cystitis with hematuria    Acute renal failure superimposed on stage 3 chronic kidney disease ([facility])    Urinary tract infection associated with indwelling urethral catheter ([facility])    Encephalopathy acute    Altered mental status    Hyperkalemia    Leg laceration, unspecified laterality, initial encounter    Pain of right lower extremity    Degloving injury       Discharge Diagnoses: Active Problems:    Leg laceration, unspecified laterality, initial encounter    Pain of right lower extremity    Degloving injury  Resolved Problems:    * No resolved hospital problems. *      Code Status:  Full Code    Condition:  Stable    Discharge Diet: Diet:  ADULT DIET;  Regular    PCP to do list: Follow for improvement of symptoms    Hospital Course: As per HPI:81 y.o. male who presents with complaints of right leg pain, after sustaining a laceration to his right leg. Patient states that he is minimally ambulatory at baseline. Earlier today he has hit his right ankle on the wheelchair sustaining a large laceration to his leg. Since then patient noticed continuous oozing of blood from the cut. Patient is on 81 mg of aspirin. Patient has chronic indwelling Iglesias catheter which has got dislodged during transportation.     Patient was seen by general surgery team and decision was made to admit to medicine given his complex medical history (hypertension, CAD, status post CABG x5, hyperlipidemia, CHF with preserved ejection fraction, neurogenic bladder requiring chronic indwelling Iglesias catheter).       Patient will be evaluated by plastic surgery in a.m. regarding definitive management of the current laceration, which is in surgical dressing after cauterization and obtaining hemostasis by the surgical team in ED. Following problems were addressed during his stay    Right leg laceration  -Plastic surgery following  -Status post STSG, application of wound VAC  -Continue pain control  -PT OT was consulted. Patient be discharged to [facility]     Acute blood loss anemia-postoperative  -Will transfuse 1 unit PRBC  -Continue monitor hemoglobin     Hypertension  -Continue Norvasc     Hyperlipidemia  -Continue Lipitor    Discharge Medications:   Current Discharge Medication List      START taking these medications    Details   oxyCODONE (ROXICODONE) 5 MG immediate release tablet Take 1 tablet by mouth every 6 hours as needed for Pain for up to 3 days.   Qty: 8 tablet, Refills: 0    Comments: Reduce doses taken as pain becomes manageable  Associated Diagnoses: Leg laceration, unspecified laterality, initial encounter           Current Discharge Medication List        Current Discharge Medication List      CONTINUE these medications which have NOT CHANGED    Details   furosemide (LASIX) 40 MG tablet TAKE 1 TABLET TWICE DAILY  Qty: 180 tablet, Refills: 3      gabapentin (NEURONTIN) 600 MG tablet Take 600 mg by mouth 2 times daily. amLODIPine (NORVASC) 5 MG tablet Take 1 tablet by mouth daily  Qty: 30 tablet, Refills: 3      vitamin B-12 (CYANOCOBALAMIN) 1000 MCG tablet Take 1,000 mcg by mouth daily      aspirin 81 MG chewable tablet Take 1 tablet by mouth daily  Qty: 30 tablet, Refills: 3      pantoprazole (PROTONIX) 40 MG tablet Take 1 tablet by mouth every morning (before breakfast)  Qty: 30 tablet, Refills: 3      tamsulosin (FLOMAX) 0.4 MG capsule Take 0.8 mg by mouth daily       Melatonin 10 MG TABS Take 10 mg by mouth nightly as needed       atorvastatin (LIPITOR) 80 MG tablet Take 80 mg by mouth nightly. !! docusate sodium (COLACE) 100 MG capsule Take 1 capsule by mouth daily as needed for Constipation  Qty: 30 capsule, Refills: 0      Balsam [place]-Castor Oil (VENELEX) OINT ointment Apply topically daily as needed      !! docusate sodium (COLACE) 100 MG capsule Take 100 mg by mouth daily as needed for Constipation      dicyclomine (BENTYL) 20 MG tablet Take 20 mg by mouth 3 times daily as needed      zolpidem (AMBIEN) 10 MG tablet Take 10 mg by mouth nightly as needed for Sleep. !! - Potential duplicate medications found. Please discuss with provider. Current Discharge Medication List              Procedures: Right leg STSG, wound VAC application    Assessment on Discharge: Stable, improved     Discharge ROS:  A complete review of systems was asked and negative except for none    Discharge Exam:  BP (!) 146/76   Pulse 69   Temp 97.7 °F (36.5 °C) (Oral)   Resp 16   Ht 5' 11\" (1.803 m)   Wt 199 lb 4.7 oz (90.4 kg)   SpO2 95%   BMI 27.80 kg/m²     General appearance: No apparent distress, appears stated age and cooperative. HEENT: Pupils equal, round, and reactive to light.  Conjunctivae/corneas clear.  Neck: Supple, with full range of motion. No jugular venous distention. Trachea midline. Respiratory:  Normal respiratory effort. Clear to auscultation, bilaterally without Rales/Wheezes/Rhonchi. Cardiovascular: Regular rate and rhythm with normal S1/S2 without murmurs, rubs or gallops. Abdomen: Soft, non-tender, non-distended with normal bowel sounds. Musculoskeletal: Right leg with dressings in place, large degloving skin tear located over the right thigh approximately 4 x 7 cm in size  Skin: Skin color, texture, turgor normal.  No rashes or lesions. Neurologic: Cranial nerves II/XII intact, bilateral lower extremity weakness noted  Psychiatric: Alert and oriented, thought content appropriate, normal insight  Capillary Refill: Brisk,3 seconds, normal   Peripheral Pulses: +2 palpable, equal bilaterally        Pertinent Studies During Hospital Stay:  Radiology:  XR CHEST PORTABLE    Result Date: 11/1/2021  Chest AP portable at 0839 INDICATIONS: Preop chest Sternotomy sutures: Intact Cardiomegaly Interval radiographic improvement in airspace disease, atelectasis or pneumonia in the right lung base Persistent left hiatal hernia and pleural effusion Cardiomegaly. Intact sternotomy sutures Noted and upper lung fields remain clear     1. Interval radiographic improvement of right basilar atelectasis, airspace disease. 2. Mild groundglass changes of the right lung however also improved when compared to prior study.           Last Labs on Discharge:     Recent Results (from the past 24 hour(s))   Hemoglobin and hematocrit, blood    Collection Time: 11/03/21  4:10 PM   Result Value Ref Range    Hemoglobin 7.5 (L) 13.5 - 17.5 g/dL    Hematocrit 22.5 (L) 40.5 - 52.5 %   CBC Auto Differential    Collection Time: 11/04/21  5:36 AM   Result Value Ref Range    WBC 9.5 4.0 - 11.0 K/uL    RBC 2.47 (L) 4.20 - 5.90 M/uL    Hemoglobin 7.7 (L) 13.5 - 17.5 g/dL    Hematocrit 23.2 (L) 40.5 - 52.5 %    MCV 93.9 80.0 - 100.0 fL MCH 31.3 26.0 - 34.0 pg    MCHC 33.4 31.0 - 36.0 g/dL    RDW 16.8 (H) 12.4 - 15.4 %    Platelets 654 (L) 822 - 450 K/uL    MPV 9.0 5.0 - 10.5 fL    Neutrophils % 69.8 %    Lymphocytes % 19.8 %    Monocytes % 7.4 %    Eosinophils % 2.4 %    Basophils % 0.6 %    Neutrophils Absolute 6.6 1.7 - 7.7 K/uL    Lymphocytes Absolute 1.9 1.0 - 5.1 K/uL    Monocytes Absolute 0.7 0.0 - 1.3 K/uL    Eosinophils Absolute 0.2 0.0 - 0.6 K/uL    Basophils Absolute 0.1 0.0 - 0.2 K/uL   Renal Function Panel    Collection Time: 11/04/21  5:36 AM   Result Value Ref Range    Sodium 138 136 - 145 mmol/L    Potassium 5.0 3.5 - 5.1 mmol/L    Chloride 104 99 - 110 mmol/L    CO2 25 21 - 32 mmol/L    Anion Gap 9 3 - 16    Glucose 90 70 - 99 mg/dL    BUN 36 (H) 7 - 20 mg/dL    CREATININE 1.3 0.8 - 1.3 mg/dL    GFR Non- 53 (A) >60    GFR African American >60 >60    Calcium 8.2 (L) 8.3 - 10.6 mg/dL    Phosphorus 4.0 2.5 - 4.9 mg/dL    Albumin 3.2 (L) 3.4 - 5.0 g/dL   Magnesium    Collection Time: 11/04/21  5:36 AM   Result Value Ref Range    Magnesium 2.60 (H) 1.80 - 2.40 mg/dL         Follow up: with INES TSANG    Note that over 30 minutes was spent in preparing discharge papers, discussing discharge with patient, medication review, etc.    Thank you INES Alva for the opportunity to be involved in this patient's care. If you have any questions or concerns please feel free to contact me at [phone].     Electronically signed by Vince Marquez MD on 11/4/2021 at 11:43 AM

## 2021-11-04 NOTE — PROGRESS NOTES
Physician Progress Note      Kiley Faria  CSN #:                  [id number]  :                       1940  ADMIT DATE:       10/31/2021 6:51 PM  [address] [place] DATE:  RESPONDING  PROVIDER #:        Tonya Wilkinson MD          QUERY TEXT:    Pt admitted 10/31 avulsion laceration right thigh with known chronic schneider for   neurogenic bladder. Pt noted to have \" active UTI\" in  Op note   indications. UA collected from schneider by nursing  for c/o \"burning\" and   \"malodorous\" urine. UA + Nitrities and Large Leuk est, but no cx indicated. No   ABT's currently. If possible, please document in the progress notes and   discharge summary if you are evaluating and/or treating any of the following: The medical record reflects the following:  Risk Factors: Neurogenic bladder with chronic f/c  Clinical Indicators:  UA obtained from F/C: Cloudy, + Nitrites, Large Leuk   Est, WBC: 6-9, Bacteria: 2+, Reflex to cx not indicated. Per Nursing  @   [phone] \" Urine sample collected (new bag connected). Pt c/o burning and states   schneider was placed 2 days ago with home health. Urine malodorous\". Per Op note   indications \" multiple co-morbidities - including an active UTI\"  Treatment: UA, No Antibiotics at this time  Options provided:  -- UTI ruled out  -- UTI due to chronic indwelling urinary catheter  -- Other - I will add my own diagnosis  -- Disagree - Not applicable / Not valid  -- Disagree - Clinically unable to determine / Unknown  -- Refer to Clinical Documentation Reviewer    PROVIDER RESPONSE TEXT:    UTI is ruled out after study. Query created by:  Christopher Borges on 11/3/2021 3:04 PM      Electronically signed by:  Tonya Wilkinson MD 11/3/2021 8:37 PM

## 2021-11-04 NOTE — PROGRESS NOTES
Surgery Daily Progress Note  Bridget Block  CC:  RLE laceration      Subjective :  No acute issues overnight. Patient received 1 unit of pRBCs yesterday. Doing well this morning. Denies any complaints. Objective    Infusions:   sodium chloride      sodium chloride          I/O:I/O last 3 completed shifts: In: 852.5 [P.O.:480; Blood:372.5]  Out: 1975 [YPFBE:6703]           Wt Readings from Last 1 Encounters:   11/03/21 199 lb 4.7 oz (90.4 kg)                 LABS:    Recent Labs     11/02/21 0430 11/02/21 0430 11/03/21  0519 11/03/21  1610   WBC 7.6  --  11.1*  --    HGB 7.5*   < > 6.8* 7.5*   HCT 22.2*   < > 20.6* 22.5*   MCV 93.0  --  93.2  --    *  --  101*  --     < > = values in this interval not displayed. Recent Labs     11/02/21 0430 11/03/21 0519    138   K 4.7 5.6*    104   CO2 25 25   PHOS 3.5 3.9   BUN 39* 38*   CREATININE 1.3 1.5*      No results for input(s): AST, ALT, ALB, BILIDIR, BILITOT, ALKPHOS in the last 72 hours. No results for input(s): LIPASE, AMYLASE in the last 72 hours. No results for input(s): PROT, INR, APTT in the last 72 hours. No results for input(s): CKTOTAL, CKMB, CKMBINDEX, TROPONINI in the last 72 hours. Exam:/65 Comment: retook pressure pt was startled  Pulse 62   Temp 98 °F (36.7 °C) (Oral)   Resp 16   Ht 5' 11\" (1.803 m)   Wt 199 lb 4.7 oz (90.4 kg)   SpO2 93%   BMI 27.80 kg/m²   General appearance: alert, appears stated age and cooperative  Eyes: No scleral icterus, EOM grossly intact  Neck: trachea midline, no JVD, no lymphadenopathy, neck supple  Chest/Lungs: normal effort with no accessory muscle use on RA  Cardiovascular: RRR, brisk capillary refill distally  Abdomen: Soft, non-tender, non-distended, no rebound, guarding, or rigidity. Skin: warm and dry, no rashes  : Iglesias catheter in place with clear, yellow urine  Extremities: edema of left distal extremity with dry skin.               RLE: ACE wrap over Kerlix over Prevena wound vac (holding adequate suction) in place without strike-through. Shedd site at right thigh with minimal bleeding beneath tegarderm, Xeroform intact   Neuro: A&Ox3, no focal deficits, sensation intact    ASSESSMENT/PLAN: Pt. is a [de-identified] y.o. male s/p right lower extremity advancement flap and split-thickness skin graft secondary to right lower extremity skin avulsion wound    - Tegaderm removed from donor site and replaced with gauze.   - continue general diet  - continue to encourage OOB/Ambulation  - OK to discharge from plastic surgery standpoint when medically ready  - Follow up with Dr. Prasanna Minor 1 week from discharge    Dung Thompson DO 11/4/2021 6:18 AM  [phone]

## 2021-11-10 ENCOUNTER — OFFICE VISIT (OUTPATIENT)
Dept: SURGERY | Age: 81
End: 2021-11-10

## 2021-11-10 VITALS
HEIGHT: 71 IN | HEART RATE: 88 BPM | SYSTOLIC BLOOD PRESSURE: 112 MMHG | DIASTOLIC BLOOD PRESSURE: 66 MMHG | TEMPERATURE: 97.2 F | WEIGHT: 184.3 LBS | BODY MASS INDEX: 25.8 KG/M2

## 2021-11-10 DIAGNOSIS — Z09 POSTOP CHECK: Primary | ICD-10-CM

## 2021-11-10 PROCEDURE — 99024 POSTOP FOLLOW-UP VISIT: CPT | Performed by: SURGERY

## 2021-11-10 NOTE — PROGRESS NOTES
[facility]    PROCEDURE: 1) Advancement flap of the right lower extremity (6 x 5.5 cm)                                                         2) Split thickness skin graft to the right lower extremity (7.2 x 5 cm)                                                         3) Application of wound vacuum device bolster  DATE: 11/2/21    Robbie Martinez has been recovering well since his procedure. Pain has been well controlled without pain medications. EXAM    /66   Pulse 88   Temp 97.2 °F (36.2 °C)   Ht 5' 11\" (1.803 m)   Wt 184 lb 4.8 oz (83.6 kg)   BMI 25.70 kg/m²     GEN: NAD   EXT: STSG with excellent take  Flap healing well    IMP: 81 y.o.male s/p STSG and advancement flap closure of LE  PLAN: Doing well overall. Will continue with local wound care and then follow-up in 1 month.      Becky Wheeler MD  [address] [place] [address] [address] Reconstructive Surgery  [phone]  11/10/21

## 2021-11-12 ENCOUNTER — TELEPHONE (OUTPATIENT)
Dept: SURGERY | Age: 81
End: 2021-11-12

## 2021-11-12 NOTE — TELEPHONE ENCOUNTER
Received call from Cachorro Nava from [facility] asking for follow up care regarding wound vac removal. Pt was seen in office earlier this week. Please call Cachorro Nava on her cell [phone].

## 2021-11-12 NOTE — TELEPHONE ENCOUNTER
PLAN: Doing well overall. Will continue with local wound care and then follow-up in 1 month. Left message to discuss this with patient.

## 2021-11-15 NOTE — TELEPHONE ENCOUNTER
Gave verbal order for nurse to do xeroform with bacitracin and kerlix to foot. Leave donor site open to air. All questions about wound care answered.

## 2021-12-07 ENCOUNTER — TELEPHONE (OUTPATIENT)
Dept: SURGERY | Age: 81
End: 2021-12-07

## 2021-12-07 NOTE — TELEPHONE ENCOUNTER
Patient called in stating that he fell and is unable to make it to his post op appt 12/8/2021.      Patient would like to know if there is anyone else that can come see him at [place] to look him over post operatively    Please contact the patient at [phone]

## 2021-12-10 NOTE — TELEPHONE ENCOUNTER
Pt states the facility is recommended that he sees their wound doctor at his facility on site. That is fine and we are happy to see if needed at anytime.

## 2021-12-28 ENCOUNTER — APPOINTMENT (OUTPATIENT)
Dept: GENERAL RADIOLOGY | Age: 81
End: 2021-12-28
Payer: MEDICARE

## 2021-12-28 ENCOUNTER — HOSPITAL ENCOUNTER (EMERGENCY)
Age: 81
Discharge: HOME | End: 2021-12-29
Attending: EMERGENCY MEDICINE
Payer: MEDICARE

## 2021-12-28 DIAGNOSIS — K56.41 FECAL IMPACTION (HCC): Primary | ICD-10-CM

## 2021-12-28 LAB
ANION GAP SERPL CALCULATED.3IONS-SCNC: 12 MMOL/L (ref 3–16)
BACTERIA: ABNORMAL /HPF
BASOPHILS ABSOLUTE: 0 K/UL (ref 0–0.2)
BASOPHILS RELATIVE PERCENT: 0.6 %
BILIRUBIN URINE: NEGATIVE
BLOOD, URINE: ABNORMAL
BUN BLDV-MCNC: 37 MG/DL (ref 7–20)
CALCIUM SERPL-MCNC: 8 MG/DL (ref 8.3–10.6)
CHLORIDE BLD-SCNC: 105 MMOL/L (ref 99–110)
CLARITY: CLEAR
CO2: 22 MMOL/L (ref 21–32)
COLOR: YELLOW
CREAT SERPL-MCNC: 1.3 MG/DL (ref 0.8–1.3)
EKG ATRIAL RATE: 70 BPM
EKG DIAGNOSIS: NORMAL
EKG Q-T INTERVAL: 430 MS
EKG QRS DURATION: 96 MS
EKG QTC CALCULATION (BAZETT): 464 MS
EKG R AXIS: 5 DEGREES
EKG T AXIS: 27 DEGREES
EKG VENTRICULAR RATE: 70 BPM
EOSINOPHILS ABSOLUTE: 0.7 K/UL (ref 0–0.6)
EOSINOPHILS RELATIVE PERCENT: 9.7 %
EPITHELIAL CELLS, UA: ABNORMAL /HPF (ref 0–5)
GFR AFRICAN AMERICAN: >60
GFR NON-AFRICAN AMERICAN: 53
GLUCOSE BLD-MCNC: 141 MG/DL (ref 70–99)
GLUCOSE URINE: NEGATIVE MG/DL
HCT VFR BLD CALC: 30.5 % (ref 40.5–52.5)
HEMOGLOBIN: 9.8 G/DL (ref 13.5–17.5)
HYALINE CASTS: ABNORMAL /LPF (ref 0–2)
KETONES, URINE: NEGATIVE MG/DL
LEUKOCYTE ESTERASE, URINE: ABNORMAL
LYMPHOCYTES ABSOLUTE: 1.3 K/UL (ref 1–5.1)
LYMPHOCYTES RELATIVE PERCENT: 18.3 %
MCH RBC QN AUTO: 28.9 PG (ref 26–34)
MCHC RBC AUTO-ENTMCNC: 32.1 G/DL (ref 31–36)
MCV RBC AUTO: 89.9 FL (ref 80–100)
MICROSCOPIC EXAMINATION: YES
MONOCYTES ABSOLUTE: 0.6 K/UL (ref 0–1.3)
MONOCYTES RELATIVE PERCENT: 7.7 %
NEUTROPHILS ABSOLUTE: 4.6 K/UL (ref 1.7–7.7)
NEUTROPHILS RELATIVE PERCENT: 63.7 %
NITRITE, URINE: NEGATIVE
PDW BLD-RTO: 16 % (ref 12.4–15.4)
PH UA: 6 (ref 5–8)
PLATELET # BLD: 187 K/UL (ref 135–450)
PMV BLD AUTO: 9.5 FL (ref 5–10.5)
POTASSIUM REFLEX MAGNESIUM: 3.7 MMOL/L (ref 3.5–5.1)
PROTEIN UA: ABNORMAL MG/DL
RBC # BLD: 3.39 M/UL (ref 4.2–5.9)
RBC UA: ABNORMAL /HPF (ref 0–4)
SODIUM BLD-SCNC: 139 MMOL/L (ref 136–145)
SPECIFIC GRAVITY UA: 1.02 (ref 1–1.03)
URINE REFLEX TO CULTURE: YES
URINE TYPE: ABNORMAL
UROBILINOGEN, URINE: 0.2 E.U./DL
WBC # BLD: 7.2 K/UL (ref 4–11)
WBC UA: >100 /HPF (ref 0–5)

## 2021-12-28 PROCEDURE — 80048 BASIC METABOLIC PNL TOTAL CA: CPT

## 2021-12-28 PROCEDURE — 6370000000 HC RX 637 (ALT 250 FOR IP): Performed by: STUDENT IN AN ORGANIZED HEALTH CARE EDUCATION/TRAINING PROGRAM

## 2021-12-28 PROCEDURE — 87086 URINE CULTURE/COLONY COUNT: CPT

## 2021-12-28 PROCEDURE — 85025 COMPLETE CBC W/AUTO DIFF WBC: CPT

## 2021-12-28 PROCEDURE — 74018 RADEX ABDOMEN 1 VIEW: CPT

## 2021-12-28 PROCEDURE — 81001 URINALYSIS AUTO W/SCOPE: CPT

## 2021-12-28 PROCEDURE — 93005 ELECTROCARDIOGRAM TRACING: CPT | Performed by: STUDENT IN AN ORGANIZED HEALTH CARE EDUCATION/TRAINING PROGRAM

## 2021-12-28 PROCEDURE — 99285 EMERGENCY DEPT VISIT HI MDM: CPT

## 2021-12-28 RX ORDER — GABAPENTIN 300 MG/1
600 CAPSULE ORAL 2 TIMES DAILY
Status: DISCONTINUED | OUTPATIENT
Start: 2021-12-29 | End: 2021-12-30 | Stop reason: HOSPADM

## 2021-12-28 RX ORDER — PANTOPRAZOLE SODIUM 40 MG/1
40 TABLET, DELAYED RELEASE ORAL
Status: DISCONTINUED | OUTPATIENT
Start: 2021-12-29 | End: 2021-12-30 | Stop reason: HOSPADM

## 2021-12-28 RX ORDER — ASPIRIN 81 MG/1
81 TABLET, CHEWABLE ORAL DAILY
Status: DISCONTINUED | OUTPATIENT
Start: 2021-12-29 | End: 2021-12-30 | Stop reason: HOSPADM

## 2021-12-28 RX ORDER — AMLODIPINE BESYLATE 5 MG/1
5 TABLET ORAL DAILY
Status: DISCONTINUED | OUTPATIENT
Start: 2021-12-29 | End: 2021-12-30 | Stop reason: HOSPADM

## 2021-12-28 RX ORDER — FUROSEMIDE 40 MG/1
40 TABLET ORAL 2 TIMES DAILY
Status: DISCONTINUED | OUTPATIENT
Start: 2021-12-29 | End: 2021-12-30 | Stop reason: HOSPADM

## 2021-12-28 RX ORDER — TAMSULOSIN HYDROCHLORIDE 0.4 MG/1
0.8 CAPSULE ORAL DAILY
Status: DISCONTINUED | OUTPATIENT
Start: 2021-12-29 | End: 2021-12-30 | Stop reason: HOSPADM

## 2021-12-28 RX ORDER — ATORVASTATIN CALCIUM 40 MG/1
80 TABLET, FILM COATED ORAL NIGHTLY
Status: DISCONTINUED | OUTPATIENT
Start: 2021-12-28 | End: 2021-12-30 | Stop reason: HOSPADM

## 2021-12-28 RX ORDER — ACETAMINOPHEN 325 MG/1
650 TABLET ORAL EVERY 4 HOURS PRN
Status: DISCONTINUED | OUTPATIENT
Start: 2021-12-28 | End: 2021-12-30 | Stop reason: HOSPADM

## 2021-12-28 RX ADMIN — MINERAL OIL 330 ML: 1000 LIQUID ORAL at 16:00

## 2021-12-28 RX ADMIN — ACETAMINOPHEN 650 MG: 325 TABLET ORAL at 22:33

## 2021-12-28 RX ADMIN — ATORVASTATIN CALCIUM 80 MG: 40 TABLET, FILM COATED ORAL at 22:33

## 2021-12-28 ASSESSMENT — PAIN SCALES - GENERAL
PAINLEVEL_OUTOF10: 6
PAINLEVEL_OUTOF10: 6

## 2021-12-28 ASSESSMENT — PAIN DESCRIPTION - LOCATION: LOCATION: ABDOMEN;BACK

## 2021-12-28 ASSESSMENT — PAIN DESCRIPTION - FREQUENCY: FREQUENCY: CONTINUOUS

## 2021-12-28 NOTE — ED PROVIDER NOTES
(N/A, 1/10/2019); Upper gastrointestinal endoscopy (N/A, 5/4/2020); sigmoidoscopy (N/A, 6/11/2020); and Leg Surgery (Right, 11/2/2021). His family history includes Heart Disease in his father. He reports that he quit smoking about 52 years ago. He has never used smokeless tobacco. He reports that he does not drink alcohol and does not use drugs. Medications     Previous Medications    AMLODIPINE (NORVASC) 5 MG TABLET    Take 1 tablet by mouth daily    ASPIRIN 81 MG CHEWABLE TABLET    Take 1 tablet by mouth daily    ATORVASTATIN (LIPITOR) 80 MG TABLET    Take 80 mg by mouth nightly. BALSAM PERU-CASTOR OIL (VENELEX) OINT OINTMENT    Apply topically daily as needed    DICYCLOMINE (BENTYL) 20 MG TABLET    Take 20 mg by mouth 3 times daily as needed    DOCUSATE SODIUM (COLACE) 100 MG CAPSULE    Take 100 mg by mouth daily as needed for Constipation    DOCUSATE SODIUM (COLACE) 100 MG CAPSULE    Take 1 capsule by mouth daily as needed for Constipation    FUROSEMIDE (LASIX) 40 MG TABLET    TAKE 1 TABLET TWICE DAILY    GABAPENTIN (NEURONTIN) 600 MG TABLET    Take 600 mg by mouth 2 times daily. MELATONIN 10 MG TABS    Take 10 mg by mouth nightly as needed     PANTOPRAZOLE (PROTONIX) 40 MG TABLET    Take 1 tablet by mouth every morning (before breakfast)    TAMSULOSIN (FLOMAX) 0.4 MG CAPSULE    Take 0.8 mg by mouth daily     VITAMIN B-12 (CYANOCOBALAMIN) 1000 MCG TABLET    Take 1,000 mcg by mouth daily    ZOLPIDEM (AMBIEN) 10 MG TABLET    Take 10 mg by mouth nightly as needed for Sleep. Allergies     He is allergic to bactrim [sulfamethoxazole-trimethoprim].     Physical Exam     INITIAL VITALS: BP: 116/63, Temp: 97.6 °F (36.4 °C), Pulse: 79, Resp: 18, SpO2: 99 %   Physical Exam    Const: ill-appearing elderly male in NAD, not well groomed  Head: multiple actinic keratoses and flaking skin on head, hair greasy  Neck: trachea midline, no LAD, no thyromegaly   Cardiac: holosytolic murmur auscultated, rate and rhythm regular   Pulm: CTAB   Abdo: generalized, mild abdo tenderness throughout all quadrants, no rebound tenderness or guarding, baclofen pump in RUQ palpated  : indwelling urinary catheter in place, patient has fecal incontinence and macerated, erythematous skin around rectum   Skin: macerated, erythematous skin around rectum as detailed above, edematous, weeping skin in BLE, wound on R knee covered with bandage without signs of infection   Neuro: AxOx4, Cranial nerve exam intact, no change in sensation, no focal neuro deficit, spasticity of extremities, 3/5 weakness in BLE   Ext: 3+ pitting edema in BLE to knee, weeping and flaking skin  DiagnosticResults     EKG   Interpreted in conjunction with emergencydepartment physician Kenn Peace MD  Rhythm: normal sinus   Rate: normal  Axis: normal  Ectopy: none  Conduction: normal  ST Segments: no acute change  T Waves:no acute change  Q Waves: none  Clinical Impression: no acute changes  Comparison:  10/31/21    RADIOLOGY:  XR ABDOMEN (KUB) (SINGLE AP VIEW)   Final Result      Frontal supine view demonstrates an unremarkable bowel gas pattern. There is moderate stool in the rectum. Generator pack overlies the right abdomen. Right hip orthopedic hardware noted. Small left pleural effusion.           LABS:   Results for orders placed or performed during the hospital encounter of 12/28/21   CBC Auto Differential   Result Value Ref Range    WBC 7.2 4.0 - 11.0 K/uL    RBC 3.39 (L) 4.20 - 5.90 M/uL    Hemoglobin 9.8 (L) 13.5 - 17.5 g/dL    Hematocrit 30.5 (L) 40.5 - 52.5 %    MCV 89.9 80.0 - 100.0 fL    MCH 28.9 26.0 - 34.0 pg    MCHC 32.1 31.0 - 36.0 g/dL    RDW 16.0 (H) 12.4 - 15.4 %    Platelets 286 647 - 640 K/uL    MPV 9.5 5.0 - 10.5 fL    Neutrophils % 63.7 %    Lymphocytes % 18.3 %    Monocytes % 7.7 %    Eosinophils % 9.7 %    Basophils % 0.6 %    Neutrophils Absolute 4.6 1.7 - 7.7 K/uL    Lymphocytes Absolute 1.3 1.0 - 5.1 K/uL    Monocytes Absolute 0.6 0.0 - 1.3 K/uL    Eosinophils Absolute 0.7 (H) 0.0 - 0.6 K/uL    Basophils Absolute 0.0 0.0 - 0.2 K/uL   Basic Metabolic Panel w/ Reflex to MG   Result Value Ref Range    Sodium 139 136 - 145 mmol/L    Potassium reflex Magnesium 3.7 3.5 - 5.1 mmol/L    Chloride 105 99 - 110 mmol/L    CO2 22 21 - 32 mmol/L    Anion Gap 12 3 - 16    Glucose 141 (H) 70 - 99 mg/dL    BUN 37 (H) 7 - 20 mg/dL    CREATININE 1.3 0.8 - 1.3 mg/dL    GFR Non- 53 (A) >60    GFR African American >60 >60    Calcium 8.0 (L) 8.3 - 10.6 mg/dL   Urinalysis Reflex to Culture    Specimen: Urine, clean catch   Result Value Ref Range    Urine Type Voided    EKG 12 Lead   Result Value Ref Range    Ventricular Rate 70 BPM    Atrial Rate 70 BPM    QRS Duration 96 ms    Q-T Interval 430 ms    QTc Calculation (Bazett) 464 ms    R Axis 5 degrees    T Axis 27 degrees    Diagnosis       EKG performed in ER and to be interpreted by ER physician. Confirmed by MD, ER ([id number]),  Helen Candelaria (CARLOS), CORTNEY ([id number]) on 12/28/2021 3:06:08 PM       ED BEDSIDE ULTRASOUND:  None    RECENT VITALS:  BP: 117/60, Temp: 97.6 °F (36.4 °C), Pulse: 73,Resp: 12, SpO2: 94 %     Procedures     None    ED Course     Nursing Notes, Past Medical Hx, Past Surgical Hx, Social Hx, Allergies, and Family Hx were reviewed. The patient was given the followingmedications:  Orders Placed This Encounter   Medications    SMOG Enema       CONSULTS:  [address] [facility] [address] [address] / ASSESSMENT / Liliana Yoni is a [de-identified] y.o. male PMH CHF HFpEF, CAD s/p CABGx5, neurogenic bladder, constipation, HTn, GERD who presents with constipation. Upon initial evaluation of patient he is non-toxic appearing and in no acute distress. Abdominal exam without rebound tenderness or guarding. Otherwise physical exam remarkable for erythematous skin in perirectal area and weeping bilateral leg edema with leg wounds. Vital signs are within normal limits.  Workup including CBC, BMP, EKG and abdominal XR unremarkable. Although patient has multiple sources for infection, concern for infection low as patient afebrile, not tachycardic and no leukocytosis. SMOG enema ordered for patient and had large bowel movement. Contacted daughter and son who stated that patient only has home health care 3 days a week. The daughter helps out with chores and daily activities but is not able to provide medical care. There is significant concern for this patient in terms of his ability to care for himself at home alone. He has many medications, indwelling urinary catheter, spasticity, leg wounds and is not ambulatory. Patient was just discharged from [facility] on December 22. Patient will be observed in ED overnight and will get evaluated by PT/OT in the morning in preparation for placement back at [facility]. Patient was signed out to oncoming provider. This patient was also evaluated by the attending physician. All care plans werediscussed and agreed upon. Clinical Impression     1. Fecal impaction ([address]        Disposition     PATIENT REFERRED TO:  No follow-up provider specified.     DISCHARGE MEDICATIONS:  New Prescriptions    No medications on file       DISPOSITION    Pending      Hao Eugene MD  Resident  12/28/21 0504

## 2021-12-28 NOTE — ED PROVIDER NOTES
ED Attending Attestation Note     Date of evaluation: 12/28/2021    This patient was seen by the resident. I have seen and examined the patient, agree with the workup, evaluation, management and diagnosis. The care plan has been discussed. My assessment reveals chronically ill gentleman coming in with constipation, chronic issues. Here he has a fecal impaction no signs of infection has a soft and benign abdominal examination. He does appear disheveled appears palliative care try to reach out to him today at his house so will discuss with family need for placement ability to care for himself at home and the services that he has at home.      Elisabeth Goltz, MD  12/28/21 8120

## 2021-12-29 VITALS
OXYGEN SATURATION: 97 % | DIASTOLIC BLOOD PRESSURE: 55 MMHG | BODY MASS INDEX: 25.9 KG/M2 | TEMPERATURE: 97.6 F | HEIGHT: 71 IN | SYSTOLIC BLOOD PRESSURE: 118 MMHG | RESPIRATION RATE: 16 BRPM | HEART RATE: 84 BPM | WEIGHT: 185 LBS

## 2021-12-29 LAB — SARS-COV-2, NAAT: NOT DETECTED

## 2021-12-29 PROCEDURE — 87635 SARS-COV-2 COVID-19 AMP PRB: CPT

## 2021-12-29 PROCEDURE — 6370000000 HC RX 637 (ALT 250 FOR IP): Performed by: STUDENT IN AN ORGANIZED HEALTH CARE EDUCATION/TRAINING PROGRAM

## 2021-12-29 PROCEDURE — 97166 OT EVAL MOD COMPLEX 45 MIN: CPT

## 2021-12-29 PROCEDURE — 97162 PT EVAL MOD COMPLEX 30 MIN: CPT

## 2021-12-29 PROCEDURE — 97535 SELF CARE MNGMENT TRAINING: CPT

## 2021-12-29 PROCEDURE — 97530 THERAPEUTIC ACTIVITIES: CPT

## 2021-12-29 RX ADMIN — PANTOPRAZOLE SODIUM 40 MG: 40 TABLET, DELAYED RELEASE ORAL at 09:29

## 2021-12-29 RX ADMIN — FUROSEMIDE 40 MG: 40 TABLET ORAL at 18:45

## 2021-12-29 RX ADMIN — TAMSULOSIN HYDROCHLORIDE 0.8 MG: 0.4 CAPSULE ORAL at 09:29

## 2021-12-29 RX ADMIN — ASPIRIN 81 MG: 81 TABLET, CHEWABLE ORAL at 09:29

## 2021-12-29 RX ADMIN — AMLODIPINE BESYLATE 5 MG: 5 TABLET ORAL at 09:29

## 2021-12-29 RX ADMIN — FUROSEMIDE 40 MG: 40 TABLET ORAL at 09:29

## 2021-12-29 RX ADMIN — ACETAMINOPHEN 650 MG: 325 TABLET ORAL at 04:26

## 2021-12-29 RX ADMIN — GABAPENTIN 600 MG: 300 CAPSULE ORAL at 09:29

## 2021-12-29 ASSESSMENT — PAIN SCALES - GENERAL
PAINLEVEL_OUTOF10: 6
PAINLEVEL_OUTOF10: 0

## 2021-12-29 NOTE — ED NOTES
Spoke with Manuel Fowler, [address] won't take the patient back d/t owing them \"a bunch of money\". Pt is aware and told her he was going to talk to them and try to work something out so that he can go back there. Unsure of outcome at this point. She also states she is going to call the son and tell him that the patient will likely have to be discharged home but she can set him up with home care.  States she is getting ready to call him now and will let me know what he says     Giuliana Borja RN  12/29/21 3335

## 2021-12-29 NOTE — PROGRESS NOTES
Occupational Therapy   Occupational Therapy Initial Assessment/Tx  Date: 2021   Patient Name: Cem Shukla  MRN: [id number]     : 1940    Date of Service: 2021    Discharge Recommendations:    Cem Shukla scored a 15/24 on the AM-PAC ADL Inpatient form. Current research shows that an AM-PAC score of 17 or less is typically not associated with a discharge to the patient's home setting. Based on the patient's AM-PAC score and their current ADL deficits, it is recommended that the patient have 3-5 sessions per week of Occupational Therapy at d/c to increase the patient's independence. Please see assessment section for further patient specific details. If patient discharges prior to next session this note will serve as a discharge summary. Please see below for the latest assessment towards goals. Assessment   Performance deficits / Impairments: Decreased functional mobility ; Decreased ADL status; Decreased endurance;Decreased balance;Decreased strength  Assessment: pt is an [de-identified] y.o. man reporting to ED demoing difficulty with self care and functional mobility. On this date pt required max assist for rolling in bed and dependent assist times 2 for clean up of incontinent BM. Further assessment limited to pts continuous BM however anticipates that pt will require extensive assist for all LB ADLs. Pt reports he is unable to stand at baseline and completes squat pivot/lateral scooting from recliner <> WC. Pt would benefit from continued IP OT at DC for increased function and participation in ADLs and functional mobility.   Treatment Diagnosis: ADL/functional mobility deficit  Prognosis: Fair  Decision Making: Medium Complexity  OT Education: OT Role;Plan of Care;ADL Adaptive Strategies  Patient Education: pt verb. understanding  Barriers to Learning: none  REQUIRES OT FOLLOW UP: Yes  Activity Tolerance  Activity Tolerance: Treatment limited secondary to medical complications (free text)  Activity Tolerance: pt participationin assessment limited by continuous liquid BM that increased with mobility. Safety Devices  Safety Devices in place: Yes  Type of devices: Call light within reach;Nurse notified; Left in bed           Patient Diagnosis(es): The encounter diagnosis was Fecal impaction ([address] has a past medical history of BPH (benign prostatic hyperplasia), Carotid stenosis, Cellulitis, Chronic back pain, Diverticular disease, Erectile dysfunction, GERD (gastroesophageal reflux disease), History of atrial fibrillation, Hypercholesteremia, Hypertension, Lower GI bleed, MDRO (multiple drug resistant organisms) resistance, Neuromuscular disorder ([address] Renal insufficiency, Risk for falls, Tinea corporis, and Vitamin B12 deficiency. 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 Treatment Diagnosis: ADL/functional mobility deficit      Restrictions  Position Activity Restriction  Other position/activity restrictions: no restrictions    Subjective   General  Chart Reviewed: Yes  Patient assessed for rehabilitation services?: Yes  Additional Pertinent Hx: [de-identified] y.o. male with PMH CHF HFpEF, CAD s/p CABGx5, neurogenic bladder, constipation, HTN, GERD who presents with constipation. Home for one week after DC from stay at [facility]. Family / Caregiver Present: No  Referring Practitioner: Rayshawn  Subjective  Subjective: pt in bed upon OT/PT arrival. \"Oh, I'm too weak to go home by myself.   Patient Currently in Pain: Yes (6/10 nagging pain at pressure ulcer sight on buttocks and lower legs.)  Vital Signs  Patient Currently in Pain: Yes (6/10 nagging pain at pressure ulcer sight on buttocks and lower legs.)  Social/Functional History  Social/Functional History  Lives With: Alone  Type of Home: House  Home Layout: One level  Home Access: Ramped entrance  [address] [facility] [place]: 3-in-1 commode  Home Equipment: [address]: Independent (indep with sponge bathing and dressing (takes 1hr to get dressed))  Homemaking Assistance:  (dtr drops off groceries, receives meds through the mail, indep with meals, dtr does laundry)  Ambulation Assistance:  (non-ambulatory, indep with w/c propulsion - uses arms and legs to propel)  Transfer Assistance: Independent (transfers from lift chair to w/c with arm rest permanently removed via sit pivot/scoot; \"I can't stand\")  Active : No  Patient's  Info: Transportation service  Additional Comments: Pt d/c'd home alone from [facility] . Pt states [facility] was supposed to come daily but this has not been set up. Pt received home RN visit. Dtr visits 2-3x/week. Self care charted per pt report however based on condition in ED it is unlikely pt has had ability to complete total body bathing or dressing independently since DC home form [facility]. Objective        Orientation  Overall Orientation Status: Within Functional Limits  Observation/Palpation  Observation: pressure ulcers noted along sacrum, R heel - pt reports R heel has been present for some time and is healing; RN aware of above (Simultaneous filing. User may not have seen previous data.)  Edema: edema of BLE  Functional Mobility  Functional Mobility Comments: Unable to asses 2/2 contiuous liquid BM increasing with bed mobility.   ADL  Toileting: Dependent/Total (pt incontinent of bowel requiring 2 person assist for rich/buttocks cleanup and change of brief and bed linens.)  Tone RUE  RUE Tone: Normotonic  Tone LUE  LUE Tone: Normotonic  Coordination  Movements Are Fluid And Coordinated: Yes     Bed mobility  Rolling to Left: Maximum assistance  Rolling to Right: Maximum assistance  Scootin Person assistance;Dependent/Total  Transfers  Transfer Comments: not attempted 2/2 continue liquid BM. Per pt report he is unable to stand at baseline. Reports using device similar to [facility] with therapy staff.      Cognition  Overall Cognitive Status: WFL                 LUE AROM (degrees)  LUE AROM : WFL  RUE AROM (degrees)  RUE AROM : WFL  LUE Strength  Gross LUE Strength: Exceptions to [facility]  L Shoulder Flex: 4/5  L Shoulder Ext: 4-/5  L Elbow Flex: 4/5  L Elbow Ext: 4-/5  L Hand General: 4+/5  RUE Strength  Gross RUE Strength: Exceptions to [facility]  R Shoulder Flex: 4/5  R Shoulder Ext: 4/5  R Elbow Flex: 4/5  R Elbow Ext: 4/5  R Hand General: 4+/5                   Plan   Plan  Times per week: 2-5  Times per day: Daily  Current Treatment Recommendations: Strengthening,Wheelchair Mobility Training,Balance Training,Patient/Caregiver Education & Training,Self-Care / ADL,Functional Mobility Training,Endurance Training                                                      -PAC Score        [facility] Inpatient Daily Activity Raw Score: 15 (12/29/21 0905)  AM-PAC Inpatient ADL T-Scale Score : 34.69 (12/29/21 0905)  ADL Inpatient CMS 0-100% Score: 56.46 (12/29/21 0905)  ADL Inpatient CMS G-Code Modifier : CK (12/29/21 0905)    Goals  Short term goals  Time Frame for Short term goals: DC  Short term goal 1: pt will tolerate sitting EOB with assist-not met  Short term goal 2: pt will complete transfer to C-not met  Short term goal 3: pt will complete UE dressing with min assist-not met  Patient Goals   Patient goals : not stated       Therapy Time   Individual Concurrent Group Co-treatment   Time In 0815         Time Out 0900         Minutes 45           Timed code tx minutes: 30  Total tx minutes: Leona Nunez, OT

## 2021-12-29 NOTE — CARE COORDINATION
Case Management Note   UPDATE:  SW informed Pt's son of DCP and will call back w/updates. SW met w/Pt at bedside. Pt would like to go SNF [facility]. SW sent referral and LVM for April, [phone]. SW spoke w/ Pt's son David Garner at [phone], and provided regarding LTC and care options. Pt's son deferred to Pt for DCP today. SW following.     Christopher Chandler, MSW, LSW  Social Work/Case Management   [facility]   Ph: [phone]

## 2021-12-29 NOTE — CARE COORDINATION
CTN contacted Andreia with [facility] [phone]. They have accepted this patient and will pull referral from [facility].  They will contact patient and make arrangements for [facility] by 12/31  Electronically signed by Giuseppe Abarca LPN on 50/65/7087 at 3:01 PM

## 2021-12-29 NOTE — CARE COORDINATION
LOC at discharge: Not Applicable  AZRA Completed: Yes    Notification completed in HENS/PAS?:  No    IMM Completed:   Not Indicated    Transportation:  Transportation PLAN for discharge: EMS transportation   Mode of Transport: Ambulance stretcher - BLS  Reason for medical transport: Bed confined: Meets the following criteria 1) unable to get out of bed without assistance or ambulate, 2) unable to safely sit up in a wheelchair, 3) unable to maintain erect seating position in a chair for time needed for transport  Name of Transport Company: [facility]  Phone: [phone]  Time of Transport: 9:00pm    Transport form completed: Yes    Home Care:  [address] ordered at discharge: Evin Gemini: [facility]  Phone: [phone]  Fax: [phone]  Orders faxed: Yes    Durable Medical Equipment:  DME Provider: none  Equipment obtained during hospitalization: wheelchair    Home Oxygen and Respiratory Equipment:  Oxygen needed at discharge?: No    Dialysis:  Dialysis patient: No    Referrals made at [facility] for outpatient continued care:  Not Applicable    Additional CM Notes:   Pt is unable to return to [facility] due to amount of money owed. Pt is able to return home w/[facility]. Pt is okay w/retrning home and contacting [facility] further for arrangements later on. Becki Alfaro confirmed w/[facility]. Pt's son is aware and in agreement w/DCP. SW scheduled transport at first available, 9:00pm via [facility]. The Plan for Transition of Care is related to the following treatment goals of No admission diagnoses are documented for this encounter. The Patient and/or patient representative Shanda Gomez and his family were provided with a choice of provider and agrees with the discharge plan Yes    Freedom of choice list was provided with basic dialogue that supports the patient's individualized plan of care/goals and shares the quality data associated with the providers. Yes    Care Transitions patient: No    ARIC Davis, [facility]  Case Management Department  Ph: [phone]  Fax: [phone]

## 2021-12-29 NOTE — ED NOTES
Called and spoke to Ap Vee on-call for social work dept. I told her that Dr. Madeleine Hatfield is not comfortable discharging Mr. Danuta Vizcarra home independently d/t the condition of his skin and lack of ability to care for himself. Per Aiden Rodrigues, patient would need PT and OT evals to be able to go to NH. I also informed Ap Vee that patient was just discharged from [facility] on 12/22. Dano Hatfield and [facility] informed of outcome of conversation with Amada Sanders RN  12/28/21 2020

## 2021-12-29 NOTE — ED NOTES
Social working to bedside. Explained to patient that ECF will not take him back d/t amount of money he owes. Offered home health services and transportation home to patient and he is agreeable. He is still trying to speak with [place] to see exactly how much he owes so he can hopefully pay for it and eventually return there.       Wicho Jimenez RN  12/29/21 6512

## 2021-12-29 NOTE — ED NOTES
Incontinent of extra large loose brown BM.  pericare given and bed linens changed     Jazlyn James RN  12/28/21 0171

## 2021-12-29 NOTE — ED NOTES
Bed: A09-09  Expected date:   Expected time:   Means of arrival:   Comments:  Rm 26     Laure Sampson RN  12/28/21 2531

## 2021-12-29 NOTE — ED NOTES
Smog enema given with moderatey formed large BM resulting     Luiza Cochran, ALPHONSO  12/28/21 2108

## 2021-12-29 NOTE — PROGRESS NOTES
Fecal impaction ([facility] [address] has a past medical history of BPH (benign prostatic hyperplasia), Carotid stenosis, Cellulitis, Chronic back pain, Diverticular disease, Erectile dysfunction, GERD (gastroesophageal reflux disease), History of atrial fibrillation, Hypercholesteremia, Hypertension, Lower GI bleed, MDRO (multiple drug resistant organisms) resistance, Neuromuscular disorder ([facility] [address] Renal insufficiency, Risk for falls, Tinea corporis, and Vitamin B12 deficiency. 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 Restrictions  Position Activity Restriction  Other position/activity restrictions: no restrictions  Vision/Hearing     Vision: WFL  Hearing: WFL  Subjective  General  Chart Reviewed: Yes  Patient assessed for rehabilitation services?: Yes  Additional Pertinent Hx: Admit 12/28 from home with constipation; PMH CHF HFpEF, CAD s/p CABGx5, neurogenic bladder, constipation, HTN, GERD, sacral ulcers, hospitalization 10/31 for R LE wound with skin graft surgery, baclofen pump, indwelling catheter  Referring Practitioner: Malini Moon MD  Diagnosis: inability to care for self, constipation  Subjective  Subjective: Pt found supine on stretcher. Agrees to PT. Reports 6/10 \"nagging\" pain B feet and on his back \"where the bedsores are\".   Pain Screening  Patient Currently in Pain: Yes (6/10 nagging pain at pressure ulcer sight on buttocks and lower legs.)          Orientation  Orientation  Overall Orientation Status: Within Normal Limits  Social/Functional History  Social/Functional History  Lives With: Alone  Type of Home: House  Home Layout: One level  Home Access: Ramped entrance  Bathroom Equipment: 3-in-1 commode  Home Equipment: [employer]  ADL Assistance: Independent (indep with sponge bathing and dressing (takes 1hr to get dressed))  Homemaking Assistance:  (dtr drops off groceries, receives meds through the mail, indep with meals, dtr does laundry)  Ambulation Assistance:  (non-ambulatory, indep with w/c propulsion - uses arms and legs to propel)  Transfer Assistance: Independent (transfers from lift chair to w/c with arm rest permanently removed via sit pivot/scoot; \"I can't stand\")  Active : No  Patient's  Info: Transportation service  Additional Comments: Pt d/c'd home alone from [facility] . Pt states HHA was supposed to come daily but this has not been set up. Pt received home RN visit. Dtr visits 2-3x/week. Self care charted per pt report however based on condition in ED it is unlikely pt has had ability to complete total body bathing or dressing independently since DC home form [place]. Cognition        Objective     Observation/Palpation  Observation: pressure ulcers noted along sacrum, R heel - pt reports R heel has been present for some time and is healing; RN aware of above (Simultaneous filing.  User may not have seen previous data.)    AROM RLE (degrees)  RLE General AROM: ankle ROM limited by edema but WFL; hip and knee WFL for bed mobility  PROM LLE (degrees)  LLE General PROM: ankle ROM limited by edema but WFL; hip and knee WFL for bed mobility  Strength RLE  Comment: able to demo ankle DF through full ROM and demo quad set with fair contraction; LEs generally weak per observation during bed mobility, able to lift LE about 1 inch off bed  Strength LLE  Comment: able to demo ankle DF through full ROM and demo quad set with fair contraction; LEs generally weak per observation during bed mobility, able to lift LE about 1 inch off bed        Bed mobility  Rolling to Left: Maximum assistance (cues for technique)  Rolling to Right: Maximum assistance (cues for technique)  Scootin Person assistance;Dependent/Total (scooting laterally in bed and scooting towards [facility])                Treatment included bed mobility training, pt education. Plan   Plan  Times per week: 2-5  Current Treatment Recommendations: Strengthening,Transfer Training,Patient/Caregiver Education & Training,ROM,Balance Training,Functional Mobility Training,Home Exercise Program  Safety Devices  Type of devices:  (pt left in bed with call light nearby as he was found, RN nearby)    G-Code       OutComes Score                                                  AM-PAC Score  AM-PAC Inpatient Mobility Raw Score : 7 (12/29/21 0920)  AM-PAC Inpatient T-Scale Score : 26.42 (12/29/21 0920)  Mobility Inpatient CMS 0-100% Score: 92.36 (12/29/21 0920)  Mobility Inpatient CMS G-Code Modifier : CM (12/29/21 0920)          Goals  Short term goals  Time Frame for Short term goals: discharge  Short term goal 1: Pt will demo rolling in bed with min A x 1  Short term goal 2: Pt will participate in sitting balance assessment  Patient Goals   Patient goals : eventually return home       Therapy Time   Individual Concurrent Group Co-treatment   Time In 0815         Time Out 0900         Minutes 45                 Timed Code Treatment Minutes:  30    Total Treatment Minutes:  45    If patient is discharged prior to next treatment, this note will serve as the discharge summary.   Kristyn Nava, PT, DPT  [phone]

## 2021-12-29 NOTE — ED NOTES
Message left in rehab dept voicemail with notification of PT and OT eval orders for Mr. Angela Allison.   ER charge ALPHONSO Spencer notified that patient is awaiting SW and PT/OT evals tomorrow     César AlcantararALPHONSO  12/28/21 2022

## 2021-12-29 NOTE — DISCHARGE INSTR - COC
12/14/2006, 12/13/2012, 11/30/2013, 10/13/2015, 09/20/2019    Influenza Whole 10/01/2007, 09/02/2010, 09/01/2011, 12/13/2012, 10/13/2015    Influenza, High Dose (Fluzone 65 yrs and older) 11/06/2014, 10/20/2020    Influenza, Quadv, IM, PF (6 mo and older Fluzone, Flulaval, Fluarix, and 3 yrs and older Afluria) 11/04/2021    PPD Test 09/16/1997    Pneumococcal Conjugate 13-valent (Hfdhars31) 12/29/2014    Pneumococcal Conjugate 7-valent (Prevnar7) 12/29/2014    Pneumococcal Polysaccharide (Ugmramtpq64) 01/27/2014       Active Problems:  Patient Active Problem List   Diagnosis Code    Hypotension I95.9    Light headed R42    Cellulitis L03.90    Essential hypertension I10    GERD (gastroesophageal reflux disease) K21.9    Acute blood loss anemia D62    Tinea corporis B35.4    Carotid stenosis I65.29    CAD (coronary artery disease) I25.10    S/P CABG x 5 Z95.1    Lower GI bleeding K92.2    Hip fracture, right ([facility]) S72.001A    Acute right hip pain M25.551    Acute low back pain without sciatica M54.50    Hypothermia T68. XXXA    Complicated UTI (urinary tract infection) N39.0    Edema R60.9    Problem with urinary catheter ([facility] [address] T83. 9XXA    Acute encephalopathy G93.40    Chronic indwelling Iglesias catheter Z97.8    Hyperlipidemia E78.5    Bilateral lower leg cellulitis L03.116, L03.115    Neurogenic bladder N31.9    Bacteriuria R82.71    Abnormality of urethral meatus Q64.70    Gross hematuria R31.0    CHF with unknown LVEF ([facility]) I50.9    Dyspnea R06.00    Bradycardia R00.1    Pseudomonas infection A49.8    Acute renal injury ([address] N17.9    Cellulitis of scrotum N49.2    Constipation K59.00    Encopresis with constipation and overflow incontinence R15.9    Abnormal stress test R94.39    H/O angiography Z92.89    Abnormal angiogram R93.89    Acute cystitis with hematuria N30.01    Acute renal failure superimposed on stage 3 chronic kidney disease (HCC) N17.9, N18.30    Urinary tract infection associated with indwelling urethral catheter ([facility] [address] T83.511A, N39.0    Encephalopathy acute G93.40    Altered mental status R41.82    Hyperkalemia E87.5    Leg laceration, unspecified laterality, initial encounter S81.819A    Pain of right lower extremity M79.604    Degloving injury T14. 8XXA       Isolation/Infection:   Isolation            No Isolation          Patient Infection Status       Infection Onset Added Last Indicated Last Indicated By Review Planned Expiration Resolved Resolved By    None active    Resolved    MDRO (multi-drug resistant organism) 06/05/21 06/08/21 06/05/21 Culture, Urine   09/27/21 Chris Correa RN    New urine cx on 6/20/2021 with no growth/was negative    COVID-19 (Rule Out) 04/24/21 04/24/21 04/24/21 COVID-19, Rapid (Ordered)   04/24/21 Rule-Out Test Resulted    C-diff Rule Out 04/21/21 04/21/21 04/22/21 GI Bacterial Pathogens By PCR (Ordered)   04/22/21 Velvet Medina RN    Order noted to be discontinued by MD; not included in GI pathogens order    COVID-19 (Rule Out) 02/19/21 02/19/21 02/19/21 COVID-19, Rapid (Ordered)   02/19/21 Rule-Out Test Resulted    C-diff Rule Out 12/13/20 12/13/20 12/13/20 Clostridium difficile toxin/antigen (Ordered)   02/16/21 Angy Syed RN    No stool ever sent.      COVID-19 (Rule Out) 06/10/20 06/10/20 06/10/20 COVID-19 (Ordered)   06/10/20 Rule-Out Test Resulted    C-diff Rule Out 06/09/20 06/09/20 06/09/20 GI Bacterial Pathogens By PCR (Ordered)   06/10/20 Rule-Out Test Resulted    C-diff Rule Out 06/09/20 06/09/20 06/09/20 Clostridium difficile toxin/antigen (Ordered)   06/09/20 Rule-Out Test Resulted    C-diff Rule Out 05/03/20 05/04/20 05/04/20 C. difficile toxin Molecular (Ordered)   05/05/20 Rule-Out Test Resulted    MDRO (multi-drug resistant organism) 05/02/20 05/04/20 05/02/20 Culture, Urine   04/22/21 Chris Correa, RN    Has had new urine culture on 4/17/21 with mixed anne; no MDRO noted    COVID-19 (Rule Out) 05/03/20 05/03/20 05/03/20 COVID-19 (Ordered)   20 Rule-Out Test Resulted    C-diff Rule Out 20 Clostridium difficile toxin/antigen (Ordered)   20 Rule-Out Test Resulted    MRSA 20 MRSA DNA Probe, Nasal   20 Esperanza Mckenzie RN    MDRO (multi-drug resistant organism) 10/26/19 10/28/19 11/19/19 Urine culture   20 Esperanza Mckenzie RN            Nurse Assessment:  Last Vital Signs: BP (!) 121/54   Pulse 98   Temp 97.6 °F (36.4 °C) (Oral)   Resp 18   Ht 5' 11\" (1.803 m)   Wt 185 lb (83.9 kg)   SpO2 96%   BMI 25.80 kg/m²     Last documented pain score (0-10 scale): Pain Level: 0  Last Weight:   Wt Readings from Last 1 Encounters:   21 185 lb (83.9 kg)     Mental Status:  {IP PT MENTAL STATUS:57257}    IV Access:  { AZRA IV ACCESS:706315222}    Nursing Mobility/ADLs:  Walking   {P DME BPZE:234601620}  Transfer  {[facility] DME MUCU:144367751}  Bathing  {[facility] DME NTTX:191135403}  Dressing  {P DME YQZP:580699386}  Toileting  {P DME YRKP:231309284}  Feeding  {[facility] DME ILYO:918656609}  Med Admin  {[facility] DME QQVP:334466814}  Med Delivery   {[facility] MED Delivery:198308779}    Wound Care Documentation and Therapy:  Negative Pressure Wound Therapy Leg Anterior; Lower;Right (Active)   Number of days: 57       Wound 21 Buttocks Right;Left redness, open areas, stage 2 ulcer (Active)   Number of days: 58        Elimination:  Continence: Bowel: {YES / D}  Bladder: {YES / MN:23180}  Urinary Catheter: {Urinary Catheter:662847680}   Colostomy/Ileostomy/Ileal Conduit: {YES / ZO:24780}       Date of Last BM: ***  No intake or output data in the 24 hours ending 21 1531  No intake/output data recorded.     Safety Concerns:     [employer] Safety Concerns:042947830}    Impairments/Disabilities:      [employer] Impairments/Disabilities:536465245}    Nutrition Therapy:  Current Nutrition Therapy:   [employer] Diet List:822513420}    Routes of Feeding: {CHP DME Other Feedings:639782386}  Liquids: {Slp liquid thickness:06303}  Daily Fluid Restriction: {CHP DME Yes amt example:354085721}  Last Modified Barium Swallow with Video (Video Swallowing Test): {Done Not Done QSML:778908413}    Treatments at the Time of Hospital Discharge:   Respiratory Treatments: ***  Oxygen Therapy:  {Therapy; copd oxygen:58664}  Ventilator:    { CC Vent OUWQ:912092153}    Rehab Therapies: {THERAPEUTIC INTERVENTION:8085631610}  Weight Bearing Status/Restrictions: 50Rene GREENFIELD Weight Bearin}  Other Medical Equipment (for information only, NOT a DME order):  {EQUIPMENT:399674388}  Other Treatments: ***    Patient's personal belongings (please select all that are sent with patient):  {CHP DME Belongings:560310163}    RN SIGNATURE:  {Esignature:438839482}    CASE MANAGEMENT/SOCIAL WORK SECTION    Inpatient Status Date: 21    Readmission Risk Assessment Score:  Readmission Risk              Risk of Unplanned Readmission:  0           Discharging to Facility/ Agency   Name: [facility]    Address:  Phone: [phone]  Fax: [phone]  [facility] by     Dialysis Facility (if applicable)   Name:  Address:  Dialysis Schedule:  Phone:  Fax:    / signature: Electronically signed by ARIC Kuhn LSW on 21 at 3:32 PM EST    PHYSICIAN SECTION    Prognosis: {Prognosis:6393482438}    Condition at Discharge: [address] Vivien [place] Patient Condition:759302741}    Rehab Potential (if transferring to Rehab): {Prognosis:4884667782}    Recommended Labs or Other Treatments After Discharge: ***    Physician Certification: I certify the above information and transfer of Shruthi Aguayo  is necessary for the continuing treatment of the diagnosis listed and that he requires {Admit to Appropriate Level of Care:25633} for {GREATER/LESS:363575215} 30 days.      Update Admission H&P: {CHP DME Changes in [facility]}    PHYSICIAN SIGNATURE:  {Esignature:674389982}

## 2021-12-30 LAB
ORGANISM: ABNORMAL
URINE CULTURE, ROUTINE: ABNORMAL

## 2021-12-30 NOTE — ED NOTES
Pt has d/c order. D/C instructions given. Pt verbalized understanding.   Pt out to lobby.  '       Ted Conrad RN  12/29/21 0240

## 2022-01-17 ENCOUNTER — HOSPITAL ENCOUNTER (INPATIENT)
Age: 82
LOS: 2 days | Discharge: HOME HEALTH | DRG: 309 | End: 2022-01-20
Attending: EMERGENCY MEDICINE | Admitting: INTERNAL MEDICINE
Payer: MEDICARE

## 2022-01-17 ENCOUNTER — APPOINTMENT (OUTPATIENT)
Dept: GENERAL RADIOLOGY | Age: 82
DRG: 309 | End: 2022-01-17
Payer: MEDICARE

## 2022-01-17 DIAGNOSIS — I49.5 SICK SINUS SYNDROME (HCC): ICD-10-CM

## 2022-01-17 DIAGNOSIS — R00.1 SYMPTOMATIC BRADYCARDIA: Primary | ICD-10-CM

## 2022-01-17 PROBLEM — R53.1 WEAKNESS: Status: ACTIVE | Noted: 2022-01-17

## 2022-01-17 LAB
ANION GAP SERPL CALCULATED.3IONS-SCNC: 8 MMOL/L (ref 3–16)
BACTERIA: ABNORMAL /HPF
BASOPHILS ABSOLUTE: 0.1 K/UL (ref 0–0.2)
BASOPHILS RELATIVE PERCENT: 1.2 %
BILIRUBIN URINE: NEGATIVE
BLOOD, URINE: ABNORMAL
BUN BLDV-MCNC: 32 MG/DL (ref 7–20)
CALCIUM SERPL-MCNC: 8.7 MG/DL (ref 8.3–10.6)
CHLORIDE BLD-SCNC: 107 MMOL/L (ref 99–110)
CLARITY: ABNORMAL
CO2: 25 MMOL/L (ref 21–32)
COLOR: YELLOW
CREAT SERPL-MCNC: 1.1 MG/DL (ref 0.8–1.3)
EKG DIAGNOSIS: NORMAL
EKG Q-T INTERVAL: 518 MS
EKG QRS DURATION: 84 MS
EKG QTC CALCULATION (BAZETT): 477 MS
EKG R AXIS: 67 DEGREES
EKG T AXIS: 32 DEGREES
EKG VENTRICULAR RATE: 51 BPM
EOSINOPHILS ABSOLUTE: 0.3 K/UL (ref 0–0.6)
EOSINOPHILS RELATIVE PERCENT: 7.3 %
GFR AFRICAN AMERICAN: >60
GFR NON-AFRICAN AMERICAN: >60
GLUCOSE BLD-MCNC: 104 MG/DL (ref 70–99)
GLUCOSE URINE: NEGATIVE MG/DL
HCT VFR BLD CALC: 30.7 % (ref 40.5–52.5)
HEMOGLOBIN: 9.7 G/DL (ref 13.5–17.5)
KETONES, URINE: NEGATIVE MG/DL
LEUKOCYTE ESTERASE, URINE: ABNORMAL
LYMPHOCYTES ABSOLUTE: 0.9 K/UL (ref 1–5.1)
LYMPHOCYTES RELATIVE PERCENT: 18.2 %
MCH RBC QN AUTO: 28.1 PG (ref 26–34)
MCHC RBC AUTO-ENTMCNC: 31.4 G/DL (ref 31–36)
MCV RBC AUTO: 89.2 FL (ref 80–100)
MICROSCOPIC EXAMINATION: YES
MONOCYTES ABSOLUTE: 0.3 K/UL (ref 0–1.3)
MONOCYTES RELATIVE PERCENT: 6.8 %
NEUTROPHILS ABSOLUTE: 3.1 K/UL (ref 1.7–7.7)
NEUTROPHILS RELATIVE PERCENT: 66.5 %
NITRITE, URINE: NEGATIVE
PDW BLD-RTO: 16.7 % (ref 12.4–15.4)
PH UA: 6.5 (ref 5–8)
PLATELET # BLD: 167 K/UL (ref 135–450)
PMV BLD AUTO: 9.2 FL (ref 5–10.5)
POTASSIUM REFLEX MAGNESIUM: 4.7 MMOL/L (ref 3.5–5.1)
PRO-BNP: 242 PG/ML (ref 0–449)
PROTEIN UA: 100 MG/DL
RAPID INFLUENZA  B AGN: NEGATIVE
RAPID INFLUENZA A AGN: NEGATIVE
RBC # BLD: 3.44 M/UL (ref 4.2–5.9)
RBC UA: ABNORMAL /HPF (ref 0–4)
SARS-COV-2, NAAT: NOT DETECTED
SARS-COV-2, NAAT: NOT DETECTED
SODIUM BLD-SCNC: 140 MMOL/L (ref 136–145)
SPECIFIC GRAVITY UA: 1.02 (ref 1–1.03)
TROPONIN: 0.03 NG/ML
TROPONIN: 0.03 NG/ML
URINE TYPE: ABNORMAL
UROBILINOGEN, URINE: 0.2 E.U./DL
WBC # BLD: 4.7 K/UL (ref 4–11)
WBC UA: ABNORMAL /HPF (ref 0–5)
YEAST: PRESENT /HPF

## 2022-01-17 PROCEDURE — 84484 ASSAY OF TROPONIN QUANT: CPT

## 2022-01-17 PROCEDURE — 83880 ASSAY OF NATRIURETIC PEPTIDE: CPT

## 2022-01-17 PROCEDURE — 81001 URINALYSIS AUTO W/SCOPE: CPT

## 2022-01-17 PROCEDURE — G0378 HOSPITAL OBSERVATION PER HR: HCPCS

## 2022-01-17 PROCEDURE — 87635 SARS-COV-2 COVID-19 AMP PRB: CPT

## 2022-01-17 PROCEDURE — 93005 ELECTROCARDIOGRAM TRACING: CPT | Performed by: EMERGENCY MEDICINE

## 2022-01-17 PROCEDURE — 85025 COMPLETE CBC W/AUTO DIFF WBC: CPT

## 2022-01-17 PROCEDURE — 80048 BASIC METABOLIC PNL TOTAL CA: CPT

## 2022-01-17 PROCEDURE — 36415 COLL VENOUS BLD VENIPUNCTURE: CPT

## 2022-01-17 PROCEDURE — 99284 EMERGENCY DEPT VISIT MOD MDM: CPT

## 2022-01-17 PROCEDURE — 71045 X-RAY EXAM CHEST 1 VIEW: CPT

## 2022-01-17 PROCEDURE — 87804 INFLUENZA ASSAY W/OPTIC: CPT

## 2022-01-17 RX ORDER — CASTOR OIL AND BALSAM, PERU 788; 87 MG/G; MG/G
OINTMENT TOPICAL DAILY PRN
Status: DISCONTINUED | OUTPATIENT
Start: 2022-01-17 | End: 2022-01-21 | Stop reason: HOSPADM

## 2022-01-17 RX ORDER — ACETAMINOPHEN 650 MG/1
650 SUPPOSITORY RECTAL EVERY 6 HOURS PRN
Status: DISCONTINUED | OUTPATIENT
Start: 2022-01-17 | End: 2022-01-21 | Stop reason: HOSPADM

## 2022-01-17 RX ORDER — DICYCLOMINE HCL 20 MG
20 TABLET ORAL 3 TIMES DAILY PRN
Status: DISCONTINUED | OUTPATIENT
Start: 2022-01-17 | End: 2022-01-21 | Stop reason: HOSPADM

## 2022-01-17 RX ORDER — FUROSEMIDE 40 MG/1
40 TABLET ORAL 2 TIMES DAILY
Status: DISCONTINUED | OUTPATIENT
Start: 2022-01-18 | End: 2022-01-21 | Stop reason: HOSPADM

## 2022-01-17 RX ORDER — PANTOPRAZOLE SODIUM 40 MG/1
40 TABLET, DELAYED RELEASE ORAL
Status: DISCONTINUED | OUTPATIENT
Start: 2022-01-18 | End: 2022-01-21 | Stop reason: HOSPADM

## 2022-01-17 RX ORDER — TAMSULOSIN HYDROCHLORIDE 0.4 MG/1
0.8 CAPSULE ORAL DAILY
Status: DISCONTINUED | OUTPATIENT
Start: 2022-01-18 | End: 2022-01-21 | Stop reason: HOSPADM

## 2022-01-17 RX ORDER — MAGNESIUM SULFATE IN WATER 40 MG/ML
2000 INJECTION, SOLUTION INTRAVENOUS PRN
Status: DISCONTINUED | OUTPATIENT
Start: 2022-01-17 | End: 2022-01-21 | Stop reason: HOSPADM

## 2022-01-17 RX ORDER — MECOBALAMIN 5000 MCG
10 TABLET,DISINTEGRATING ORAL NIGHTLY PRN
Status: DISCONTINUED | OUTPATIENT
Start: 2022-01-18 | End: 2022-01-21 | Stop reason: HOSPADM

## 2022-01-17 RX ORDER — AMLODIPINE BESYLATE 5 MG/1
5 TABLET ORAL DAILY
Status: DISCONTINUED | OUTPATIENT
Start: 2022-01-18 | End: 2022-01-18

## 2022-01-17 RX ORDER — SODIUM CHLORIDE 9 MG/ML
1000 INJECTION, SOLUTION INTRAVENOUS ONCE
Status: DISCONTINUED | OUTPATIENT
Start: 2022-01-17 | End: 2022-01-17

## 2022-01-17 RX ORDER — SODIUM CHLORIDE 9 MG/ML
25 INJECTION, SOLUTION INTRAVENOUS PRN
Status: DISCONTINUED | OUTPATIENT
Start: 2022-01-17 | End: 2022-01-21 | Stop reason: HOSPADM

## 2022-01-17 RX ORDER — GABAPENTIN 600 MG/1
600 TABLET ORAL 2 TIMES DAILY
Status: DISCONTINUED | OUTPATIENT
Start: 2022-01-18 | End: 2022-01-21 | Stop reason: HOSPADM

## 2022-01-17 RX ORDER — DOCUSATE SODIUM 100 MG/1
100 CAPSULE, LIQUID FILLED ORAL DAILY PRN
Status: DISCONTINUED | OUTPATIENT
Start: 2022-01-17 | End: 2022-01-21 | Stop reason: HOSPADM

## 2022-01-17 RX ORDER — ASPIRIN 81 MG/1
81 TABLET, CHEWABLE ORAL DAILY
Status: DISCONTINUED | OUTPATIENT
Start: 2022-01-18 | End: 2022-01-21 | Stop reason: HOSPADM

## 2022-01-17 RX ORDER — SODIUM CHLORIDE, SODIUM LACTATE, POTASSIUM CHLORIDE, AND CALCIUM CHLORIDE .6; .31; .03; .02 G/100ML; G/100ML; G/100ML; G/100ML
1000 INJECTION, SOLUTION INTRAVENOUS ONCE
Status: DISCONTINUED | OUTPATIENT
Start: 2022-01-17 | End: 2022-01-17

## 2022-01-17 RX ORDER — SODIUM CHLORIDE 0.9 % (FLUSH) 0.9 %
10 SYRINGE (ML) INJECTION PRN
Status: DISCONTINUED | OUTPATIENT
Start: 2022-01-17 | End: 2022-01-21 | Stop reason: HOSPADM

## 2022-01-17 RX ORDER — ONDANSETRON 2 MG/ML
4 INJECTION INTRAMUSCULAR; INTRAVENOUS EVERY 6 HOURS PRN
Status: DISCONTINUED | OUTPATIENT
Start: 2022-01-17 | End: 2022-01-21 | Stop reason: HOSPADM

## 2022-01-17 RX ORDER — SODIUM CHLORIDE 0.9 % (FLUSH) 0.9 %
10 SYRINGE (ML) INJECTION EVERY 12 HOURS SCHEDULED
Status: DISCONTINUED | OUTPATIENT
Start: 2022-01-17 | End: 2022-01-21 | Stop reason: HOSPADM

## 2022-01-17 RX ORDER — ACETAMINOPHEN 325 MG/1
650 TABLET ORAL EVERY 6 HOURS PRN
Status: DISCONTINUED | OUTPATIENT
Start: 2022-01-17 | End: 2022-01-21 | Stop reason: HOSPADM

## 2022-01-17 RX ORDER — ATORVASTATIN CALCIUM 40 MG/1
80 TABLET, FILM COATED ORAL NIGHTLY
Status: DISCONTINUED | OUTPATIENT
Start: 2022-01-17 | End: 2022-01-21 | Stop reason: HOSPADM

## 2022-01-17 RX ORDER — PROMETHAZINE HYDROCHLORIDE 25 MG/1
12.5 TABLET ORAL EVERY 6 HOURS PRN
Status: DISCONTINUED | OUTPATIENT
Start: 2022-01-17 | End: 2022-01-21 | Stop reason: HOSPADM

## 2022-01-17 RX ORDER — POTASSIUM CHLORIDE 7.45 MG/ML
10 INJECTION INTRAVENOUS PRN
Status: DISCONTINUED | OUTPATIENT
Start: 2022-01-17 | End: 2022-01-21 | Stop reason: HOSPADM

## 2022-01-17 ASSESSMENT — ENCOUNTER SYMPTOMS
EYES NEGATIVE: 1
BACK PAIN: 1
RHINORRHEA: 0
NAUSEA: 0
SORE THROAT: 0
ROS SKIN COMMENTS: SACRAL DECUBITUS
COUGH: 0
SHORTNESS OF BREATH: 0
ABDOMINAL PAIN: 0
VOMITING: 0
RESPIRATORY NEGATIVE: 1

## 2022-01-17 NOTE — ED PROVIDER NOTES
[facility]          ATTENDING PHYSICIAN NOTE       Date of evaluation: 1/17/2022    Chief Complaint     Fatigue      History of Present Illness     Amira Stokes is a [de-identified] y.o. male, quite chronically ill, although still living at home with visiting home health, who presents with worsening generalized fatigue and weakness, and very poor appetite and p.o. intake. Patient states that he has been feeling more generally weak and fatigued for the last couple of days, and today was too weak and with too little appetite to eat or drink anything at all. He denies alice nausea or vomiting, or abdominal pain. He denies URI symptoms or cough, chest pain or shortness of breath. He states he was told by a home health nurse yesterday that they were concerned he might have a urinary tract infection. He does have a chronic indwelling Iglesias catheter in place. His only complaint at this time is of chronic pain relating to his sacral decubitus wounds. He denies chest pain or shortness of breath. He denies fevers or chills. He denies dizziness/vertigo, but has been feeling lightheaded. Review of Systems     Review of Systems   Constitutional: Positive for activity change, appetite change and fatigue. Negative for chills and fever. HENT: Negative. Negative for congestion, rhinorrhea and sore throat. Eyes: Negative. Negative for visual disturbance. Respiratory: Negative. Negative for cough and shortness of breath. Cardiovascular: Negative. Negative for chest pain. Gastrointestinal: Negative for abdominal pain, nausea and vomiting. Genitourinary: Negative for decreased urine volume and hematuria. Iglesias catheter in place   Musculoskeletal: Positive for back pain. Chronic   Skin: Positive for wound. Sacral decubitus   Neurological: Positive for weakness and light-headedness. Negative for syncope. Psychiatric/Behavioral: Negative.         Past Medical, Surgical, Family, and Social History     He has a past medical history of BPH (benign prostatic hyperplasia), Carotid stenosis, Cellulitis, Chronic back pain, Diverticular disease, Erectile dysfunction, GERD (gastroesophageal reflux disease), History of atrial fibrillation, Hypercholesteremia, Hypertension, Lower GI bleed, MDRO (multiple drug resistant organisms) resistance, Neuromuscular disorder ([facility] [address] Renal insufficiency, Risk for falls, Tinea corporis, and Vitamin B12 deficiency. 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 His family history includes Heart Disease in his father. He reports that he quit smoking about 52 years ago. He has never used smokeless tobacco. He reports that he does not drink alcohol and does not use drugs. Medications     Previous Medications    AMLODIPINE (NORVASC) 5 MG TABLET    Take 1 tablet by mouth daily    ASPIRIN 81 MG CHEWABLE TABLET    Take 1 tablet by mouth daily    ATORVASTATIN (LIPITOR) 80 MG TABLET    Take 80 mg by mouth nightly. BALSAM PERU-CASTOR OIL (VENELEX) OINT OINTMENT    Apply topically daily as needed    DICYCLOMINE (BENTYL) 20 MG TABLET    Take 20 mg by mouth 3 times daily as needed    DOCUSATE SODIUM (COLACE) 100 MG CAPSULE    Take 100 mg by mouth daily as needed for Constipation    DOCUSATE SODIUM (COLACE) 100 MG CAPSULE    Take 1 capsule by mouth daily as needed for Constipation    FUROSEMIDE (LASIX) 40 MG TABLET    TAKE 1 TABLET TWICE DAILY    GABAPENTIN (NEURONTIN) 600 MG TABLET    Take 600 mg by mouth 2 times daily.     MELATONIN 10 MG TABS    Take 10 mg by mouth nightly as needed     PANTOPRAZOLE (PROTONIX) 40 MG TABLET    Take 1 tablet by mouth every morning (before breakfast)    TAMSULOSIN (FLOMAX) 0.4 MG CAPSULE    Take 0.8 mg by mouth daily     VITAMIN B-12 (CYANOCOBALAMIN) 1000 MCG TABLET    Take 1,000 mcg by mouth daily    ZOLPIDEM (AMBIEN) 10 MG TABLET    Take 10 mg by mouth nightly as needed for Sleep. Allergies     He is allergic to bactrim [sulfamethoxazole-trimethoprim]. Physical Exam     INITIAL VITALS: BP: 134/81, Temp: 99 °F (37.2 °C), Pulse: 62, Resp: 18, SpO2: 97 %     General: Very frail-appearing, chronically ill-appearing gentleman. He is awake and alert, although a poor historian. He is in no acute respiratory distress. HEENT: Pupils are equal, round, and reactive to light. Extraocular muscles are intact. Conjunctivae are clear and moist. No redness or drainage from the eyes. Neck: Supple, with full range of motion. Cardiovascular: Bradycardic, but regular. Normal S1-S2. 2+ radial pulses bilaterally. 1+ DP pulses bilaterally. Respiratory: Unlabored breathing with equal chest rise and fall. Generally good air entry, although coarse at the bases. Abdomen: Soft and nontender, without guarding or rebound tenderness. No masses or hepatosplenomegaly. Skin: Generally warm and dry. There is a sacral decubitus wound overlying the central buttocks bilaterally. There is maceration of the tissues, with adherent stool, but no active drainage, no evidence of infection. Neuro: Alert and oriented x3. Significant bilateral lower extremity weakness, which the patient states is baseline. Otherwise no focal neurologic deficits are noted. Extremities: Generally warm and well-perfused. There is diffuse bilateral pitting edema of the lower extremities and feet. The patient moves all extremities equally. Psych: The patient's mood and affect are generally within normal limits for their presentation. Diagnostic Results     EKG   Sinus bradycardia with a ventricular rate of 46 bpm.  Normal axis.   Somewhat poor R wave progression in the anterior precordial Negative mg/dL    Bilirubin Urine Negative Negative    Ketones, Urine Negative Negative mg/dL    Specific Gravity, UA 1.025 1.005 - 1.030    Blood, Urine TRACE-INTACT (A) Negative    pH, UA 6.5 5.0 - 8.0    Protein,  (A) Negative mg/dL    Urobilinogen, Urine 0.2 <2.0 E.U./dL    Nitrite, Urine Negative Negative    Leukocyte Esterase, Urine MODERATE (A) Negative    Microscopic Examination YES     Urine Type Voided    Microscopic Urinalysis   Result Value Ref Range    WBC, UA 10-20 (A) 0 - 5 /HPF    RBC, UA 0-2 0 - 4 /HPF    Bacteria, UA Rare (A) None Seen /HPF    Yeast, UA Present (A) None Seen /HPF   Troponin (lab)   Result Value Ref Range    Troponin 0.03 (H) <0.01 ng/mL   EKG 12 Lead   Result Value Ref Range    Ventricular Rate 51 BPM    QRS Duration 84 ms    Q-T Interval 518 ms    QTc Calculation (Bazett) 477 ms    R Axis 67 degrees    T Axis 32 degrees    Diagnosis       EKG performed in ER and to be interpreted by ER physician. Confirmed by MD, ER ([id number]),  Edvin Phelps ([phone]) on 1/17/2022 6:56:11 PM       RECENT VITALS:  BP: (!) 117/54, Temp: 99 °F (37.2 °C), Pulse: (!) 43, Resp: 10, SpO2: 99 %     Procedures       ED Course     Nursing Notes, Past Medical Hx, Past Surgical Hx, Social Hx, Allergies, and Family Hx were reviewed. The patient was given the following medications:  Orders Placed This Encounter   Medications    lactated ringers bolus       CONSULTS:  IP CONSULT TO HOSPITALIST    MEDICAL DECISION MAKING / ASSESSMENT / Rizwana Hanna is a [de-identified] y.o. male with a complex past medical history that includes neurogenic bladder with indwelling Iglesias catheter with prior multidrug-resistant urinary tract infections, coronary artery disease status post CABG with concern from cardiology for sick sinus syndrome from prior admissions, and numerous other comorbidities. He is currently living at home, with some visiting home health, although is minimally functional in that setting.   He presents with worsening weakness over this weekend, with wooziness and lightheadedness, and very poor p.o. intake, of somewhat unclear etiology. Infectious work-up was generally benign. He has a few white blood cells and yeast in his urine, but the urinalysis is actually much  looking than multiple priors, and he is not experiencing lower abdominal discomfort which would suggest that he has a symptomatic urinalysis. ID has recommended against antibiotic treatment in similar symptomatic settings with more infectious findings on urinalysis. As result, urinary tract infection is not felt to be present. Chest x-ray shows some chronic findings of interstitial opacities and left pleural effusion, but unchanged from previously. Laboratory evaluation is otherwise generally unremarkable, with a scant troponin leak that is stable. He does not have evidence of ALIS or significant electrolyte disturbances. Patient is noted to be bradycardic, with his heart rate on the EKG in the 40s. Over the course of his emergency department stay, his heart rate has dropped into the 30s multiple times. On his recent event monitor from the summer, it appears that his average heart rate was in the 50s, and he does appear to be likely more bradycardic than this at this time. Overall, the primary abnormality discovered in the patient's emergency department stay today is his significant bradycardia, which may be the major contributing factor to his worsening weakness and lightheadedness over the weekend. As the patient seems to have worsening of the bradycardia previously evaluated by cardiology, and is now fairly significantly symptomatic, he does warrant admission for cardiac monitoring and consultation with cardiology, to determine if he now requires intervention such as a pacemaker. Patient's case was discussed with the hospitalist, and he is being admitted at this time. Clinical Impression     1.  Symptomatic bradycardia 2. Sick sinus syndrome (HCC)        Disposition     PATIENT REFERRED TO:  No follow-up provider specified. DISCHARGE MEDICATIONS:  New Prescriptions    No medications on file       DISPOSITION admit    (Please note that portions of this note were completed with voice recognition software.   Efforts were made to edit the dictations but occasionally words are mis-transcribed.)     Lynda Decker MD  01/17/22 0375

## 2022-01-18 ENCOUNTER — APPOINTMENT (OUTPATIENT)
Dept: GENERAL RADIOLOGY | Age: 82
DRG: 309 | End: 2022-01-18
Payer: MEDICARE

## 2022-01-18 LAB
A/G RATIO: 0.8 (ref 1.1–2.2)
ALBUMIN SERPL-MCNC: 3.1 G/DL (ref 3.4–5)
ALP BLD-CCNC: 123 U/L (ref 40–129)
ALT SERPL-CCNC: 13 U/L (ref 10–40)
ANION GAP SERPL CALCULATED.3IONS-SCNC: 6 MMOL/L (ref 3–16)
AST SERPL-CCNC: 20 U/L (ref 15–37)
BASOPHILS ABSOLUTE: 0 K/UL (ref 0–0.2)
BASOPHILS ABSOLUTE: 0 K/UL (ref 0–0.2)
BASOPHILS RELATIVE PERCENT: 0.7 %
BASOPHILS RELATIVE PERCENT: 0.7 %
BILIRUB SERPL-MCNC: <0.2 MG/DL (ref 0–1)
BUN BLDV-MCNC: 29 MG/DL (ref 7–20)
CALCIUM SERPL-MCNC: 8.6 MG/DL (ref 8.3–10.6)
CHLORIDE BLD-SCNC: 105 MMOL/L (ref 99–110)
CO2: 26 MMOL/L (ref 21–32)
CREAT SERPL-MCNC: 1 MG/DL (ref 0.8–1.3)
EOSINOPHILS ABSOLUTE: 0.3 K/UL (ref 0–0.6)
EOSINOPHILS ABSOLUTE: 0.3 K/UL (ref 0–0.6)
EOSINOPHILS RELATIVE PERCENT: 5.6 %
EOSINOPHILS RELATIVE PERCENT: 5.9 %
FERRITIN: 32.9 NG/ML (ref 30–400)
FOLATE: 8.65 NG/ML (ref 4.78–24.2)
GFR AFRICAN AMERICAN: >60
GFR NON-AFRICAN AMERICAN: >60
GLUCOSE BLD-MCNC: 105 MG/DL (ref 70–99)
GLUCOSE BLD-MCNC: 110 MG/DL (ref 70–99)
GLUCOSE BLD-MCNC: 112 MG/DL (ref 70–99)
GLUCOSE BLD-MCNC: 71 MG/DL (ref 70–99)
GLUCOSE BLD-MCNC: 96 MG/DL (ref 70–99)
HCT VFR BLD CALC: 27.7 % (ref 40.5–52.5)
HCT VFR BLD CALC: 28 % (ref 40.5–52.5)
HEMOGLOBIN: 8.8 G/DL (ref 13.5–17.5)
HEMOGLOBIN: 8.9 G/DL (ref 13.5–17.5)
IRON SATURATION: 19 % (ref 20–50)
IRON: 57 UG/DL (ref 59–158)
LACTIC ACID: 1.3 MMOL/L (ref 0.4–2)
LYMPHOCYTES ABSOLUTE: 0.6 K/UL (ref 1–5.1)
LYMPHOCYTES ABSOLUTE: 0.8 K/UL (ref 1–5.1)
LYMPHOCYTES RELATIVE PERCENT: 13.2 %
LYMPHOCYTES RELATIVE PERCENT: 16.8 %
MCH RBC QN AUTO: 27.9 PG (ref 26–34)
MCH RBC QN AUTO: 29.1 PG (ref 26–34)
MCHC RBC AUTO-ENTMCNC: 31.5 G/DL (ref 31–36)
MCHC RBC AUTO-ENTMCNC: 32.2 G/DL (ref 31–36)
MCV RBC AUTO: 88.6 FL (ref 80–100)
MCV RBC AUTO: 90.3 FL (ref 80–100)
MONOCYTES ABSOLUTE: 0.3 K/UL (ref 0–1.3)
MONOCYTES ABSOLUTE: 0.4 K/UL (ref 0–1.3)
MONOCYTES RELATIVE PERCENT: 5.7 %
MONOCYTES RELATIVE PERCENT: 8.3 %
NEUTROPHILS ABSOLUTE: 3.5 K/UL (ref 1.7–7.7)
NEUTROPHILS ABSOLUTE: 3.5 K/UL (ref 1.7–7.7)
NEUTROPHILS RELATIVE PERCENT: 71.2 %
NEUTROPHILS RELATIVE PERCENT: 71.9 %
PDW BLD-RTO: 16.6 % (ref 12.4–15.4)
PDW BLD-RTO: 17.6 % (ref 12.4–15.4)
PERFORMED ON: ABNORMAL
PERFORMED ON: NORMAL
PLATELET # BLD: 154 K/UL (ref 135–450)
PLATELET # BLD: 158 K/UL (ref 135–450)
PMV BLD AUTO: 8.9 FL (ref 5–10.5)
PMV BLD AUTO: 9.4 FL (ref 5–10.5)
POTASSIUM REFLEX MAGNESIUM: 4.6 MMOL/L (ref 3.5–5.1)
PROCALCITONIN: 0.04 NG/ML (ref 0–0.15)
RBC # BLD: 3.07 M/UL (ref 4.2–5.9)
RBC # BLD: 3.16 M/UL (ref 4.2–5.9)
SODIUM BLD-SCNC: 137 MMOL/L (ref 136–145)
T4 FREE: 0.9 NG/DL (ref 0.9–1.8)
TOTAL CK: 114 U/L (ref 39–308)
TOTAL IRON BINDING CAPACITY: 295 UG/DL (ref 260–445)
TOTAL PROTEIN: 7.1 G/DL (ref 6.4–8.2)
TSH REFLEX: 5.19 UIU/ML (ref 0.27–4.2)
VITAMIN B-12: 453 PG/ML (ref 211–911)
WBC # BLD: 4.9 K/UL (ref 4–11)
WBC # BLD: 5 K/UL (ref 4–11)

## 2022-01-18 PROCEDURE — 83540 ASSAY OF IRON: CPT

## 2022-01-18 PROCEDURE — 84439 ASSAY OF FREE THYROXINE: CPT

## 2022-01-18 PROCEDURE — 94761 N-INVAS EAR/PLS OXIMETRY MLT: CPT

## 2022-01-18 PROCEDURE — 84145 PROCALCITONIN (PCT): CPT

## 2022-01-18 PROCEDURE — 2580000003 HC RX 258: Performed by: INTERNAL MEDICINE

## 2022-01-18 PROCEDURE — 2060000000 HC ICU INTERMEDIATE R&B

## 2022-01-18 PROCEDURE — 83605 ASSAY OF LACTIC ACID: CPT

## 2022-01-18 PROCEDURE — 82746 ASSAY OF FOLIC ACID SERUM: CPT

## 2022-01-18 PROCEDURE — 82550 ASSAY OF CK (CPK): CPT

## 2022-01-18 PROCEDURE — 80053 COMPREHEN METABOLIC PANEL: CPT

## 2022-01-18 PROCEDURE — 82728 ASSAY OF FERRITIN: CPT

## 2022-01-18 PROCEDURE — 6370000000 HC RX 637 (ALT 250 FOR IP): Performed by: INTERNAL MEDICINE

## 2022-01-18 PROCEDURE — 36415 COLL VENOUS BLD VENIPUNCTURE: CPT

## 2022-01-18 PROCEDURE — 85025 COMPLETE CBC W/AUTO DIFF WBC: CPT

## 2022-01-18 PROCEDURE — 83550 IRON BINDING TEST: CPT

## 2022-01-18 PROCEDURE — 73590 X-RAY EXAM OF LOWER LEG: CPT

## 2022-01-18 PROCEDURE — 99222 1ST HOSP IP/OBS MODERATE 55: CPT | Performed by: INTERNAL MEDICINE

## 2022-01-18 PROCEDURE — 82607 VITAMIN B-12: CPT

## 2022-01-18 PROCEDURE — 99221 1ST HOSP IP/OBS SF/LOW 40: CPT | Performed by: INTERNAL MEDICINE

## 2022-01-18 PROCEDURE — 97530 THERAPEUTIC ACTIVITIES: CPT

## 2022-01-18 PROCEDURE — 92526 ORAL FUNCTION THERAPY: CPT

## 2022-01-18 PROCEDURE — 97162 PT EVAL MOD COMPLEX 30 MIN: CPT

## 2022-01-18 PROCEDURE — 94640 AIRWAY INHALATION TREATMENT: CPT

## 2022-01-18 PROCEDURE — 84443 ASSAY THYROID STIM HORMONE: CPT

## 2022-01-18 PROCEDURE — 97166 OT EVAL MOD COMPLEX 45 MIN: CPT

## 2022-01-18 PROCEDURE — 87040 BLOOD CULTURE FOR BACTERIA: CPT

## 2022-01-18 PROCEDURE — 92610 EVALUATE SWALLOWING FUNCTION: CPT

## 2022-01-18 PROCEDURE — 97535 SELF CARE MNGMENT TRAINING: CPT

## 2022-01-18 PROCEDURE — 6360000002 HC RX W HCPCS: Performed by: INTERNAL MEDICINE

## 2022-01-18 RX ORDER — ALBUTEROL SULFATE 2.5 MG/3ML
2.5 SOLUTION RESPIRATORY (INHALATION) 3 TIMES DAILY
Status: DISCONTINUED | OUTPATIENT
Start: 2022-01-18 | End: 2022-01-18

## 2022-01-18 RX ORDER — GUAIFENESIN 600 MG/1
600 TABLET, EXTENDED RELEASE ORAL 2 TIMES DAILY
Status: DISCONTINUED | OUTPATIENT
Start: 2022-01-18 | End: 2022-01-21 | Stop reason: HOSPADM

## 2022-01-18 RX ORDER — ALBUTEROL SULFATE 2.5 MG/3ML
2.5 SOLUTION RESPIRATORY (INHALATION) EVERY 4 HOURS PRN
Status: DISCONTINUED | OUTPATIENT
Start: 2022-01-18 | End: 2022-01-19

## 2022-01-18 RX ADMIN — ALBUTEROL SULFATE 2.5 MG: 2.5 SOLUTION RESPIRATORY (INHALATION) at 21:25

## 2022-01-18 RX ADMIN — PANTOPRAZOLE SODIUM 40 MG: 40 TABLET, DELAYED RELEASE ORAL at 08:22

## 2022-01-18 RX ADMIN — FUROSEMIDE 40 MG: 40 TABLET ORAL at 18:29

## 2022-01-18 RX ADMIN — GABAPENTIN 600 MG: 600 TABLET, FILM COATED ORAL at 20:42

## 2022-01-18 RX ADMIN — FUROSEMIDE 40 MG: 40 TABLET ORAL at 08:19

## 2022-01-18 RX ADMIN — SODIUM CHLORIDE, PRESERVATIVE FREE 10 ML: 5 INJECTION INTRAVENOUS at 08:20

## 2022-01-18 RX ADMIN — Medication 10 MG: at 20:43

## 2022-01-18 RX ADMIN — GABAPENTIN 600 MG: 600 TABLET, FILM COATED ORAL at 08:19

## 2022-01-18 RX ADMIN — ASPIRIN 81 MG: 81 TABLET, CHEWABLE ORAL at 08:19

## 2022-01-18 RX ADMIN — ALBUTEROL SULFATE 2.5 MG: 2.5 SOLUTION RESPIRATORY (INHALATION) at 14:01

## 2022-01-18 RX ADMIN — TAMSULOSIN HYDROCHLORIDE 0.8 MG: 0.4 CAPSULE ORAL at 08:19

## 2022-01-18 RX ADMIN — GUAIFENESIN 600 MG: 600 TABLET, EXTENDED RELEASE ORAL at 12:27

## 2022-01-18 RX ADMIN — GUAIFENESIN 600 MG: 600 TABLET, EXTENDED RELEASE ORAL at 20:43

## 2022-01-18 RX ADMIN — ATORVASTATIN CALCIUM 80 MG: 40 TABLET, FILM COATED ORAL at 20:42

## 2022-01-18 RX ADMIN — SODIUM CHLORIDE, PRESERVATIVE FREE 10 ML: 5 INJECTION INTRAVENOUS at 20:43

## 2022-01-18 ASSESSMENT — PAIN SCALES - GENERAL
PAINLEVEL_OUTOF10: 0
PAINLEVEL_OUTOF10: 6
PAINLEVEL_OUTOF10: 0
PAINLEVEL_OUTOF10: 3
PAINLEVEL_OUTOF10: 0

## 2022-01-18 ASSESSMENT — PAIN DESCRIPTION - FREQUENCY: FREQUENCY: CONTINUOUS

## 2022-01-18 ASSESSMENT — PAIN DESCRIPTION - DESCRIPTORS: DESCRIPTORS: BURNING

## 2022-01-18 ASSESSMENT — PAIN DESCRIPTION - ORIENTATION: ORIENTATION: LOWER

## 2022-01-18 ASSESSMENT — PAIN DESCRIPTION - LOCATION: LOCATION: BACK

## 2022-01-18 NOTE — CONSULTS
Department of Podiatry Consult Note  Resident       Reason for Consult: Leg wounds    Requesting Physician: Dr. Petrona Bishop: Leg swelling    HISTORY OF PRESENT ILLNESS:                The patient is a [de-identified] y.o. male with significant past medical history please see list below. Podiatry was consulted for leg wounds. Patient was last seen in our services on 1/20/2022 for local wound care. Patient is established with a [facility] physician Dr. Dmitry Cunningham for wound care. He also receives home care with every other day dressing changes per Dr. Dashawn Martinez. Patient relates that this swelling to both of his feet have been ongoing for years now and now has developed a new ulcer but unaware of how long its been. Patient denies any pain to both legs during today's physical examination. Patient's main reason to come to the hospital was due to he thought that he had a urinary tract infection. Patient denies nausea, vomiting, fever, chills, shortness of breath, or other constitutional symptoms. Past Medical History:        Diagnosis Date    BPH (benign prostatic hyperplasia)     Carotid stenosis 12/2013    JESU 57-95% stenosis; LICA 88-05% stenosis    Cellulitis 12/2013    LLE    Chronic back pain     Diverticular disease     Erectile dysfunction     GERD (gastroesophageal reflux disease)     History of atrial fibrillation     Hypercholesteremia     Hypertension     Lower GI bleed     MDRO (multiple drug resistant organisms) resistance 10/26/2019    urine    Neuromuscular disorder (HCC)     spasticity    Renal insufficiency     Risk for falls     uses walker    Tinea corporis 12/2013    LLE    Vitamin B12 deficiency      Past Surgical History:        Procedure Laterality Date    BACK SURGERY  2006    lower lumbar    CORONARY ARTERY BYPASS GRAFT  12/13/2013    CABG x 5 (Dr Mary Armstrong), svg to diag, om1 and 3, distal rca, kelly to lad.      CYSTOSCOPY N/A 1/10/2019    CYSTOSCOPY performed by Kerry Lazaro Veda Olivares MD at [facility] 1690 Right 5/1/2015    ORIF    LEG SURGERY Right 11/2/2021    RIGHT LOWER EXTREMITY ADVANCEMENT FLAP AND SPLIT THICKNESS SKIN GRAFT PLACEMENT; (WOUND- 10 CM X 5.5 CM; CLOSURE- 6 CM X 5.5 CM; SKIN GRAFT- 7.2 CM X 5 CM) performed by Edith Sharma MD at [address] [place] [address] 6/11/2020    [address] [facility] performed by Sterling Goldman MD at [facility] 27 N/A 5/4/2020    EGD BIOPSY performed by Sterling Goldman MD at [facility] ENDOSCOPY     Current Medications:    Current Facility-Administered Medications: guaiFENesin (MUCINEX) extended release tablet 600 mg, 600 mg, Oral, BID  albuterol (PROVENTIL) nebulizer solution 2.5 mg, 2.5 mg, Nebulization, TID  sodium chloride flush 0.9 % injection 10 mL, 10 mL, IntraVENous, 2 times per day  sodium chloride flush 0.9 % injection 10 mL, 10 mL, IntraVENous, PRN  0.9 % sodium chloride infusion, 25 mL, IntraVENous, PRN  potassium chloride 10 mEq/100 mL IVPB (Peripheral Line), 10 mEq, IntraVENous, PRN  magnesium sulfate 2000 mg in 50 mL IVPB premix, 2,000 mg, IntraVENous, PRN  promethazine (PHENERGAN) tablet 12.5 mg, 12.5 mg, Oral, Q6H PRN **OR** ondansetron (ZOFRAN) injection 4 mg, 4 mg, IntraVENous, Q6H PRN  acetaminophen (TYLENOL) tablet 650 mg, 650 mg, Oral, Q6H PRN **OR** acetaminophen (TYLENOL) suppository 650 mg, 650 mg, Rectal, Q6H PRN  aspirin chewable tablet 81 mg, 81 mg, Oral, Daily  [Held by provider] atorvastatin (LIPITOR) tablet 80 mg, 80 mg, Oral, Nightly  Venelex ointment, , Topical, Daily PRN  dicyclomine (BENTYL) tablet 20 mg, 20 mg, Oral, TID PRN  docusate sodium (COLACE) capsule 100 mg, 100 mg, Oral, Daily PRN  gabapentin (NEURONTIN) tablet 600 mg, 600 mg, Oral, BID  melatonin disintegrating tablet 10 mg, 10 mg, Oral, Nightly PRN  pantoprazole (PROTONIX) tablet 40 mg, 40 mg, Oral, QAM AC  tamsulosin (FLOMAX) capsule 0.8 mg, 0.8 mg, Oral, Daily  furosemide (LASIX) tablet 40 mg, 40 mg, Oral, BID  Allergies:   Bactrim [sulfamethoxazole-trimethoprim]  Social History:    TOBACCO:   reports that he quit smoking about 52 years ago. He has never used smokeless tobacco.  Family History:       Problem Relation Age of Onset    Heart Disease Father      REVIEW OF SYSTEMS:      ROS: A 10 point review of systems was conducted, significant findings as noted in HPI. All other systems negative. PHYSICAL EXAM:      Vitals:    BP 99/63   Pulse 81   Temp 97.8 °F (36.6 °C) (Oral)   Resp 18   Ht 5' 11\" (1.803 m)   Wt 230 lb 6.1 oz (104.5 kg)   SpO2 96%   BMI 32.13 kg/m²     LABS:   Recent Labs     01/18/22  0202 01/18/22 0618   WBC 5.0 4.9   HGB 8.9* 8.8*   HCT 27.7* 28.0*    154     Recent Labs     01/18/22  0618      K 4.6      CO2 26   BUN 29*   CREATININE 1.0     Recent Labs     01/18/22 0618   PROT 7.1       VASCULAR:   - DP and PT pulses are non palpable b/l 2/2+2 pitting edema. - Upon hand-held Doppler biphasic signals noted to DP and PT pulses B/L LE.  - CFT is brisk to the digits of the foot b/l.   - Skin temperature is warm to lukewarm from proximal to distal with no focal calor noted. -Upon elevating B/L lower extremities dependent rubor noted and dissipating once elevated. - +2 pitting edema noted. - No pain with calf compression b/l. NEUROLOGIC:   - Gross and epicritic sensation is intact b/l.   - Protective sensation is appreciated at all pedal sites b/l.     DERMATOLOGIC:   Dermatological changes noted B/L leg    Right lower extremity:    -Partial thickness ulceration noted medial aspect of anterior shin.  -Wound measures approximately 3.2 cm x 2.7 cm x superficial.  -Wound base dermal and epithelialized tissue with slightly xerotic periwound.  -Wound does not probe to bone, tunnels, or tracks.  -No fluctuance, crepitus, malodor, or drainage noted.  -Wound appears clinically stable without acute signs of infection noted.    Picture taken 1/18/2022    Patient gave verbal consent for the picture taken at today's visit. Right lower extremity:  -Multiple dried serous crust anterior aspect of shin 2/2 scratching.  -Dried serous crust adhered and without any open wounds noted.  -Wounds appear clinically stable without acute signs infection noted. MUSCULOSKELETAL:   - Muscle strength is 3/5 for all pedal groups tested. - No pain with palpation of the foot or ankle b/l. - Ankle joint ROM is decreased in dorsiflexion with the knee extended. IMAGING:   Right tib-fib x-ray; 1/18/2022 pending      IMPRESSION/RECOMMENDATIONS:    -Chronic venous stasis partial-thickness ulceration, right lower extremity (POA) 2/2 fluid overload  -Chronic venous stasis dermatitis  -Lymphedema B/L LE  2/2 fluid overload  -Peripheral vascular disease      -Patient seen and examined at bedside this afternoon  -VSS, afebrile, no leukocytosis noted  -ESR  and CRP; pending  -Urine cultures, positive for yeast.  -RLE arterial duplex ordered; will follow-up.  -Wound culture not obtained at this time 2/2 no active drainage and would only colonized skin anne. -Infectious disease consulted, appreciate recommendations  -RLE dressed with Adaptic, DSD, and Ace bandage. -LLE dressed with Ace bandage.  -Prevalon boots reapplied. Patient is to wear at all times while in bed to prevent further deep tissue injury.  -Full weightbearing to B/L LE as tolerated and with assistance.  -Lengthy discussion with patient regarding the importance of extremity elevation and edema control, patient voiced understanding      DISPO: Chronic venous stasis dermatitis with partial thickness ulceration to the right lower extremity. Wound appears clinically stable. We will follow-up on all labs and advanced imaging. We will continue to closely follow patient while in house for local wound care. Thank you for the opportunity to take part in the patient's care.      - The patient will be staffed with Dr. Ney Gentile DPM   Podiatric Resident, PGY-3  Pager: [phone] or Perfect serve     This chart was likely completed using voice recognition technology and may contain unintended grammatical , phraseology,and/or punctuation errors      Patient was seen and evaluated at bedside. Agree with residents assessment and treatment plan.   Jaguar Kiser DPM

## 2022-01-18 NOTE — CARE COORDINATION
General Rules:  1. Can ONLY be done Monday- Friday between 8:30am-5pm  2. Prescription(s) must be in pharmacy by 3pm to be filled same day  3. Copy of patient's insurance/ prescription drug card and patient face sheet must be sent along with the prescription(s)  4. Cost of Rx cannot be added to hospital bill. If financial assistance is needed, please contact unit  or ;  or  CANNOT provide pharmacy voucher for patients co-pays  5. Patients can then  the prescription on their way out of the hospital at discharge, or pharmacy can deliver to the bedside if staff is available. (payment due at time of pick-up or delivery - cash, check, or card accepted)     Able to afford home medications/ co-pay costs: Yes    ADLS  Support Systems:      PT AM-PAC: 12 /24  OT AM-PAC: 13 /24    [place]: lives alone in one level house  Steps: ramped entry    [address] [facility] [address]: 3-in-1 commode  Home Equipment: i[employer] 41 Help From: Family  ADL Assistance: Needs assistance (pt indep with sponge bathing and dressing previously 12/29. Since pt has not washed and with minimal self dressing, EMS helped with pants per report.)  Homemaking Assistance: Needs assistance (dtr completes cleaning and groceries, pt claims homeaid is supposed to be coming, unclear how often \"everyday\" but then says 2 days a week.)  Homemaking Responsibilities: No  Ambulation Assistance: Independent (pt reports independent with wheelchair)  Transfer Assistance: Independent (reports independent with squat pivot from lift chair to [facility]. Likely has transferred minimally since last hospital visit.  12/29)  Active : No  Patient's  Info: Transportation service    Plans to RETURN to current housing: TBD  Barriers to RETURNING to current housing: weakness at present time    Lilo Mel[address] 79  Currently ACTIVE with [address]: Yes  Home Care Agency: [facility] Phone: [phone]  Fax: [phone]    Currently ACTIVE with [place] on Aging: Yes  Passport/ Waiver: Yes  Passport/ Waiver Services: [address] for 2 hours/ day for 2 days per week Emilia Marcos    :  Cassell Lennox Phone: [phone]      [address] [facility] Provider: n/a  Equipment: wheelchair, bath bench and reacher, sock aid, lift chair (sleeps in lift chair)    Home Oxygen and [address] prior to admission: No      Dialysis  Active with HD/PD prior to admission: No      DISCHARGE PLAN:  Disposition: [place] (SNF): TBD Phone: [phone] Fax: [phone] for discharge: EMS transportation   Usually uses :  [facility] (must call 24 hours in advance) for transport home   [address], [place], [address]   Phone: [phone]    Factors facilitating achievement of predicted outcomes: Family support and Cooperative    Barriers to discharge: Medical complications    Additional Case Management Notes: Spoke to patient and he prefers [facility] , then [email or web address]. He lives in [facility]. He is concerned if his insurance will approve stay. This CM faxed referrals to [employer] and [email or web address] and asked that they call CM at [phone] for follow up. Electronically signed by Sharilyn Saint, RN on 1/18/2022 at 5:39 PM      The Plan for Transition of Care is related to the following treatment goals of Sick sinus syndrome ([facility] [address] [I49.5]  Weakness [R53.1]  Symptomatic bradycardia [R00.1]  Hypothermia, initial encounter [T68. XXXA]    The Patient and/or patient representative Bryan Meade and his family were provided with a choice of provider and agrees with the discharge plan Yes    Freedom of choice list was provided with basic dialogue that supports the patient's individualized plan of care/goals and shares the quality data associated with the providers.  Yes    Care Transition patient: No    Sharilyn Saint, RN  [facility]  Case Management Department  Ph: [phone]

## 2022-01-18 NOTE — PROGRESS NOTES
REMOVED PARADA PLACED #16 FR. IMMEDIATE URINE RETURNED NO ISSUES WITH INSERTION.  PT STATES HE HAS HAD A CHRONIC PARADA SINCE JAN. 2019

## 2022-01-18 NOTE — CONSULTS
Infectious Diseases Inpatient Consult Note    Medical Student note - reviewed and modified, see Attending addendum at bottom    Reason for Consult:   Hypothermia, Hypotension, Chronic Schneider with history of colonization  Requesting Physician:   Dr. Ghanshyam Forbes  Primary Care Physician:  Latasha Sky  History Obtained From:   Pt, EPIC    Admit Date: 1/17/2022  Hospital Day: 2    CHIEF COMPLAINT:      Chief Complaint   Patient presents with    Fatigue       HISTORY OF PRESENT ILLNESS:      Buck Pryor is an [de-identified]year old male with a past medical history of prior UTI's (with growth of Proteus, Morganella), HTN, HLD, A. Fib, BPH who presented to the emergency department on 1/17 with fatigue, weakness and poor PO intake. He has a chronic indwelling schneider catheter and was told by his home health nurse that he may have a UTI. Additionally he has a chronic sacral decubitus wound.  -1/17 urinalysis demonstrated moderate LE, 100 protein, and trace blood  -1/17 microscopic UA demonstrated 10-20 WBC, rare bacteria, and yeast present  -1/17 EKG demonstrated sinus bradycardia    1/18 @ 0135 pt became hypothermic at 90.7f and received active external rewarming    Today pt states that he is feeling well and the only complaint he has is lower back pain from his bed sores.  He currently denies fever, chills, cp, sob, nausea, vomiting, diarrhea    Past Medical History:    Past Medical History:   Diagnosis Date    BPH (benign prostatic hyperplasia)     Carotid stenosis 12/2013    JESU 62-39% stenosis; LICA 88-08% stenosis    Cellulitis 12/2013    LLE    Chronic back pain     Diverticular disease     Erectile dysfunction     GERD (gastroesophageal reflux disease)     History of atrial fibrillation     Hypercholesteremia     Hypertension     Lower GI bleed     MDRO (multiple drug resistant organisms) resistance 10/26/2019    urine    Neuromuscular disorder (HCC)     spasticity    Renal insufficiency     Risk for falls uses walker    Tinea corporis 12/2013    LLE    Vitamin B12 deficiency        Past Surgical History:    Past Surgical History:   Procedure Laterality Date    BACK SURGERY  2006    lower lumbar    CORONARY ARTERY BYPASS GRAFT  12/13/2013    CABG x 5 (Dr Regla Musa), svg to diag, om1 and 3, distal rca, kelly to lad.  CYSTOSCOPY N/A 1/10/2019    CYSTOSCOPY performed by Susi Corey MD at [facility] 1690 Right 5/1/2015    ORIF    LEG SURGERY Right 11/2/2021    RIGHT LOWER EXTREMITY ADVANCEMENT FLAP AND SPLIT THICKNESS SKIN GRAFT PLACEMENT; (WOUND- 10 CM X 5.5 CM; CLOSURE- 6 CM X 5.5 CM; SKIN GRAFT- 7.2 CM X 5 CM) performed by Jeannette Wall MD at [address] 6/11/2020    [address] [facility] performed by Leontine Kehr, MD at [address] [facility] 5/4/2020    EGD BIOPSY performed by Leontine Kehr, MD at [facility] ENDOSCOPY       Current Medications:     sodium chloride flush  10 mL IntraVENous 2 times per day    aspirin  81 mg Oral Daily    [Held by provider] atorvastatin  80 mg Oral Nightly    gabapentin  600 mg Oral BID    pantoprazole  40 mg Oral QAM AC    tamsulosin  0.8 mg Oral Daily    furosemide  40 mg Oral BID       Allergies:  Bactrim [sulfamethoxazole-trimethoprim]    Social History:    TOBACCO:    Former smoker  ETOH:    no  DRUGS:   no  MARITAL STATUS:     OCCUPATION:   retired    Patient lives at home    Family History:   No immunodeficiency    REVIEW OF SYSTEMS:    No fever / chills / sweats. No weight loss. No visual change, eye pain, eye discharge. No oral lesion, sore throat, dysphagia. Denies cough / sputum. Denies chest pain, palpitations. Denies n / v / abd pain. No diarrhea. Denies dysuria or change in urinary function. Denies joint swelling or pain. No myalgia, arthralgia.  Chronic back pain  Sacral wound present, scattered abrasions  Generalized weakness, sensory change or other neurologic symptom    Denies new / worse depression, psychiatric symptoms    PHYSICAL EXAM:      Vitals:    BP 99/63   Pulse 81   Temp 97.8 °F (36.6 °C) (Oral)   Resp 18   Ht 5' 11\" (1.803 m)   Wt 230 lb 6.1 oz (104.5 kg)   SpO2 96%   BMI 32.13 kg/m²     GENERAL: No apparent distress.     HEENT: Membranes moist, no oral lesion, PERRL  NECK:  Supple, no lymphadenopathy  LUNGS: Clear b/l, no rales, no dullness  CARDIAC: RRR, no murmur appreciated  ABD:  + BS, soft / NT  EXT:  No rash, 2+ edema LE, Superficial abrasion over right anterior ankle  NEURO: No focal neurologic findings  PSYCH: Orientation, sensorium, mood normal  LINES:  Peripheral iv    DATA:    Lab Results   Component Value Date    WBC 4.9 01/18/2022    HGB 8.8 (L) 01/18/2022    HCT 28.0 (L) 01/18/2022    MCV 88.6 01/18/2022     01/18/2022     Lab Results   Component Value Date    CREATININE 1.0 01/18/2022    BUN 29 (H) 01/18/2022     01/18/2022    K 4.6 01/18/2022     01/18/2022    CO2 26 01/18/2022       Hepatic Function Panel:   Lab Results   Component Value Date    ALKPHOS 123 01/18/2022    ALT 13 01/18/2022    AST 20 01/18/2022    PROT 7.1 01/18/2022    BILITOT <0.2 01/18/2022    BILIDIR <0.2 06/20/2021    IBILI see below 06/20/2021    LABALBU 3.1 01/18/2022       Micro:    Blood cultures x2 pending-Drawn 1/18 1/17  Component Ref Range & Units 1/17/22 1725   Color, UA Straw/Yellow Yellow    Clarity, UA Clear SL CLOUDY Abnormal     Glucose, Ur Negative mg/dL Negative    Bilirubin Urine Negative Negative    Ketones, Urine Negative mg/dL Negative    Specific Gravity, UA 1.005 - 1.030 1.025    Blood, Urine Negative TRACE-INTACT Abnormal     pH, UA 5.0 - 8.0 6.5    Protein, UA Negative mg/dL 100 Abnormal     Urobilinogen, Urine <2.0 E.U./dL 0.2    Nitrite, Urine Negative Negative    Leukocyte Esterase, Urine Negative MODERATE Abnormal       1/17  Component Ref Range & Units 1/17/22 1725   WBC, UA 0 - 5 /HPF 10-20 Abnormal     RBC, UA 0 - 4 /HPF 0-2    Bacteria, UA None Seen /HPF Rare Abnormal     Yeast, UA None Seen /HPF Present Abnormal       Imaging:   No pertinent imaging     IMPRESSION:      Patient Active Problem List   Diagnosis    Hypotension    Light headed    Cellulitis    Essential hypertension    GERD (gastroesophageal reflux disease)    Acute blood loss anemia    Tinea corporis    Carotid stenosis    CAD (coronary artery disease)    S/P CABG x 5    Lower GI bleeding    Hip fracture, right (HCC)    Acute right hip pain    Acute low back pain without sciatica    Hypothermia    Complicated UTI (urinary tract infection)    Edema    Problem with urinary catheter (HCC)    Acute encephalopathy    Chronic indwelling Iglesias catheter    Hyperlipidemia    Bilateral lower leg cellulitis    Neurogenic bladder    Bacteriuria    Abnormality of urethral meatus    Gross hematuria    CHF with unknown LVEF ([facility])    Dyspnea    Bradycardia    Pseudomonas infection    Acute renal injury ([facility] [address]    Cellulitis of scrotum    Constipation    Encopresis with constipation and overflow incontinence    Abnormal stress test    H/O angiography    Abnormal angiogram    Acute cystitis with hematuria    Acute renal failure superimposed on stage 3 chronic kidney disease ([facility])    Urinary tract infection associated with indwelling urethral catheter ([facility])    Encephalopathy acute    Altered mental status    Hyperkalemia    Leg laceration, unspecified laterality, initial encounter    Pain of right lower extremity    Degloving injury    Weakness       Chronic sacral wound  Urine culture pending  Blood cultures pending    RECOMMENDATIONS:  Follow up urine and blood cultures  No antibiotics at this time    Discussed with Dr. Jia Rodriguez to Medical Student Consult note:  Pt seen,examined and evaluated. I have independently performed history, physical exam, lab and data review.   I have determined assessment and plan as documented by student Babs Nunes). [de-identified] yo man with hx CAD, HTN, BPH, HL, nephrolithiasis  Hx UTIs, had chronic schneider, prior cult with Proteus penneri   Hx sacral wound, R LE wound (anterior tibia)    Hosp at [facility] 10/31 - 11/4   D/c from [place] in late Dec     Referred to [facility] with fatigue, poss UTI by home Health. In ED 1/17 T 99, WBC 4.7, UA mod LE, micro 10-20 WBC  Bradycardic (lowest 33), Hypothermic (lowest 90.7)    Admit, consulted Card    Today 1/18 - afeb (last temp 97.6), normal HR (last 91)  Awake, alert. No  complaints    IMP/  Mult med problems  Bradycardia / hypothermia resolved    - Card involved    Bacteriuria vs UTI, favor bacteriuria    REC/  Cont off antibiotics for now  Change schneider catheter  Will f/u on cult,  / clinical status    Medical Decision Making:   The following items were considered in medical decision making:  Discussion of patient care with other providers  Reviewed clinical lab tests  Reviewed radiology tests  Reviewed other diagnostic tests/interventions  Microbiology cultures and other micro tests reviewed      Discussed with pt, RN  Kristen Martinez MD

## 2022-01-18 NOTE — CONSULTS
[address]   Cardiac Electrophysiology Consultation   Date: 1/18/2022  Admit Date:  1/17/2022  Reason for Consultation: Bradycardia  Consult Requesting Physician: Juan Carlos Begum MD     Chief Complaint   Patient presents with    Fatigue     HPI: Urvashi Angelo is a [de-identified] y.o. w/ PMHx of carotid stenosis,CAD s/p CABG 2013 who presented to the hospital with fatigue and generalized weakness. He said he fell and was on ground sometime when he finally pulled himself along and reached land line. He called 911. EKG on admission showed sinus bradycardia w/ HR of 46. EP is consulted for evaluation of bradycardia. Patient follows with Dr. Celestina Holstein, last seen in office 08/2021. At that time, 2-week monitor showed predominantly sinus rhythm, rate ranging from  bpm. ~3% <40 bpm( nocturnal), single pause lasting 2.5s. Patient was hypothermic. Omaira hugger was applied. Temp normalized this AM. HR improved with temperature improvement. Patient reports that his fatigue feels somewhat improved, though weakness is the same. Patient reports having multiple episodes of \"falling asleep\" while talking on the phone with his girlfriend. He reports that he would be out for a few minutes and that he felt it happened suddenly without prodromal symptoms. He denies true syncope or fainting. Does report light-headedness when bending over in wheelchair, but denies chest pain, shortness of breath, orthopnea, palpitations.       Past Medical History:   Diagnosis Date    BPH (benign prostatic hyperplasia)     Carotid stenosis 12/2013    JESU 61-20% stenosis; LICA 52-61% stenosis    Cellulitis 12/2013    LLE    Chronic back pain     Diverticular disease     Erectile dysfunction     GERD (gastroesophageal reflux disease)     History of atrial fibrillation     Hypercholesteremia     Hypertension     Lower GI bleed     MDRO (multiple drug resistant organisms) resistance 10/26/2019    urine    Neuromuscular disorder ([address] spasticity    Renal insufficiency     Risk for falls     uses walker    Tinea corporis 12/2013    LLE    Vitamin B12 deficiency         Past Surgical History:   Procedure Laterality Date    BACK SURGERY  2006    lower lumbar    CORONARY ARTERY BYPASS GRAFT  12/13/2013    CABG x 5 (Dr Ewelina Ibrahim), svg to diag, om1 and 3, distal rca, kelly to lad.  CYSTOSCOPY N/A 1/10/2019    CYSTOSCOPY performed by De Sacks, MD at [address] Right 5/1/2015    ORIF    LEG SURGERY Right 11/2/2021    RIGHT LOWER EXTREMITY ADVANCEMENT FLAP AND SPLIT THICKNESS SKIN GRAFT PLACEMENT; (WOUND- 10 CM X 5.5 CM; CLOSURE- 6 CM X 5.5 CM; SKIN GRAFT- 7.2 CM X 5 CM) performed by Nicolás Arce MD at [address] 6/11/2020    [address] [facility] performed by Radha Raygoza MD at [address] [facility] 5/4/2020    EGD BIOPSY performed by Radha Raygoza MD at [address]   Allergen Reactions    Bactrim [Sulfamethoxazole-Trimethoprim] Rash       Social History:  Reviewed. reports that he quit smoking about 52 years ago. He has never used smokeless tobacco. He reports that he does not drink alcohol and does not use drugs. Family History:  Reviewed. family history includes Heart Disease in his father. No premature CAD. Review of System:  All other systems reviewed except for that noted above.  Pertinent negatives and positives are:     Objective      · General: negative for fever, chills    Very weak per patient  · Ophthalmic ROS: negative for - eye pain or loss of vision  · ENT ROS: negative for - headaches, sore throat   · Respiratory: negative for - cough, sputum  · Cardiovascular: Reviewed in HPI  · Gastrointestinal: negative for - abdominal pain, diarrhea, N/V  · Hematology: negative for - bleeding, blood clots, bruising or jaundice  · Genito-Urinary:  negative for - Dysuria or incontinence  · Musculoskeletal: negative for - Joint swelling, muscle pain  · Neurological: negative for - confusion, dizziness, headaches   · Psychiatric: No anxiety, no depression. · Dermatological: negative for - rash    Physical Examination:  Vitals:    22 0717   BP: 99/63   Pulse: 81   Resp: 18   Temp: 97.8 °F (36.6 °C)   SpO2: 96%        Intake/Output Summary (Last 24 hours) at 2022 0827  Last data filed at 2022 0713  Gross per 24 hour   Intake 0 ml   Output 450 ml   Net -450 ml     In: 0   Out: 450    Wt Readings from Last 3 Encounters:   22 230 lb 6.1 oz (104.5 kg)   21 185 lb (83.9 kg)   11/10/21 184 lb 4.8 oz (83.6 kg)     Temp  Av.8 °F (34.3 °C)  Min: 90.7 °F (32.6 °C)  Max: 99 °F (37.2 °C)  Pulse  Av  Min: 33  Max: 84  BP  Min: 99/63  Max: 160/91  SpO2  Av.6 %  Min: 95 %  Max: 100 %    · Telemetry: Currently normal sinus rhythm  · Constitutional: Alert. Oriented to person, place, and time. No distress. · Head: atraumatic  · Mouth/Throat: Lips appear moist.   · Eyes: Conjunctivae normal.   · Neck: Neck supple. No JVD present. · Cardiovascular: Normal rate, regular rhythm. Normal S0&G2.  · IV/VI Systolic ejection murmur heard best at LLSB  · Pulmonary/Chest: Bilateral respiratory sounds present. No respiratory accessory muscle use. No wheezes. Bilateral rhonchi   · Abdominal: Soft. Normal bowel sounds present. No distension, No tenderness. No splenomegaly. No hernia. · Musculoskeletal: No tenderness. Soft 3+ pitting B/L LE edema  Legs wrapped   · Neurological: Alert and oriented. No gross deficit to touch. · Skin: Skin is warm and dry. No rash, lesions, ulcerations noted. · Psychiatric: No anxiety nor agitation. Labs:  Reviewed.    Recent Labs     22  1710 22  0618    137   K 4.7 4.6    105   CO2 25 26   BUN 32* 29*   CREATININE 1.1 1.0     Recent Labs     22  1710 22  0202 22  0618   WBC 4.7 5.0 4.9   HGB 9.7* 8. 9* 8.8*   HCT 30.7* 27.7* 28.0*   MCV 89.2 90.3 88.6    158 154     Lab Results   Component Value Date    CKTOTAL 93 06/19/2021    TROPONINI 0.03 01/17/2022     No results found for: BNP  Lab Results   Component Value Date    PROTIME 11.0 10/31/2021    PROTIME 14.2 04/22/2021    PROTIME 14.1 02/14/2021    INR 0.97 10/31/2021    INR 1.22 04/22/2021    INR 1.21 02/14/2021     Lab Results   Component Value Date    CHOL 102 06/05/2021    HDL 43 06/05/2021    TRIG 54 06/05/2021       Diagnostic and imaging results reviewed. ECG: Slow Atrial fibrillation, HR 51  Echo:   TTE 01/2020  Normal left ventricle size. There is mild concentric left ventricular   hypertrophy. Overall left ventricular systolic function appears normal with   an ejection fraction of 55-60%. No regional wall motion abnormalities are   noted. Diastolic filling parameters suggests normal diastolic function. Trace mitral and tricuspid regurgitation is present. Estimated pulmonary artery systolic pressure is 30 mmHg assuming a right   atrial pressure of 3 mmHg. Biatrial enlargement. Cath:   08/2020  Patient with 5 vessel bypass in 4 of the grafts are in really great shape. The DEANNA is atretic into the LAD. We do not see any opportunities for intervention on this patient. The LV function is good with normal ejection fraction of 55 to 60%. I independently reviewed the ECG and telemetry.      Admitting ekg, much artifact obscures, but likely sinus bradycardia with respiratory variation    Scheduled Meds:   sodium chloride flush  10 mL IntraVENous 2 times per day    aspirin  81 mg Oral Daily    [Held by provider] atorvastatin  80 mg Oral Nightly    gabapentin  600 mg Oral BID    pantoprazole  40 mg Oral QAM AC    tamsulosin  0.8 mg Oral Daily    furosemide  40 mg Oral BID     Continuous Infusions:   sodium chloride       PRN Meds:.sodium chloride flush, sodium chloride, potassium chloride, magnesium sulfate, promethazine **OR** ondansetron, acetaminophen **OR** acetaminophen, Venelex, dicyclomine, docusate sodium, melatonin     Assessment:   Patient Active Problem List    Diagnosis Date Noted    Weakness 01/17/2022    Pain of right lower extremity     Degloving injury     Leg laceration, unspecified laterality, initial encounter 10/31/2021    Altered mental status     Hyperkalemia     Encephalopathy acute 04/21/2021    Acute renal failure superimposed on stage 3 chronic kidney disease ([address] 02/15/2021    Urinary tract infection associated with indwelling urethral catheter ([address] 02/15/2021    Acute cystitis with hematuria 02/14/2021    Abnormal angiogram 08/27/2020    H/O angiography 08/20/2020    Abnormal stress test 08/18/2020    Constipation 06/09/2020    Encopresis with constipation and overflow incontinence 06/09/2020    Cellulitis of scrotum 05/02/2020    Acute renal injury ([address] 03/17/2020    Pseudomonas infection 02/06/2020    Dyspnea     Bradycardia     CHF with unknown LVEF ([address] 01/13/2020    Gross hematuria 12/17/2019    Chronic indwelling Iglesias catheter 11/20/2019    Hyperlipidemia 11/20/2019    Bilateral lower leg cellulitis 11/20/2019    Neurogenic bladder 11/20/2019    Bacteriuria 11/20/2019    Abnormality of urethral meatus 11/20/2019    Acute encephalopathy 10/08/2019    Problem with urinary catheter ([address] 10/07/2019    Edema 16/70/3330    Complicated UTI (urinary tract infection) 01/07/2019    Hypothermia 01/03/2019    Acute low back pain without sciatica 05/26/2017    Hip fracture, right ([address] 05/01/2015    Acute right hip pain     Lower GI bleeding 11/10/2014    CAD (coronary artery disease) 01/27/2014    S/P CABG x 5 01/27/2014    Cellulitis 12/16/2013    Essential hypertension 12/16/2013    GERD (gastroesophageal reflux disease) 12/16/2013    Acute blood loss anemia 12/16/2013    Tinea corporis 12/16/2013    Carotid stenosis 12/16/2013    Hypotension 11/30/2013    Light headed 11/30/2013      Active Hospital Problems    Diagnosis Date Noted    Weakness [R53.1] 01/17/2022    Hypothermia [T68. XXXA] 01/03/2019       Recommendation (s):    The bradycardia resolved temporally with correction of body temperature( unclear how long he was on floor after fall. .. he says 45 minutes). Sinus rate 90 bpm now. Past evaluations concerned about bradycardia have revealed no concerns including two monitors last 7/2021     CAD s/p CABG x5      Continue on telemetry  Continue ASA 81mg daily  Continue amlodipine 5mg daily  Statin held in setting of generalized weakness. Will check CK, if normal, could restart statin;  Maintain K>4.0, Mg >2.0  Consider 2-week monitor on discharge. Thank you for allowing me to participate in the care of Hortencia Block . If you have any questions/comments, please do not hesitate to contact us. Currently no convincing evidence that bradycardia caused his presentation. Suspect he fell and became hypothermic and then bradycardic, now apparently resolved. Raven Lynn MD   PGY-2 Internal Medicine  01/18/22    Rhianna Lopez MD  Cardiac Electrophysiology  [address]    For any EP related issues after 5 PM, contact [facility] on call cardiology through .

## 2022-01-18 NOTE — PROGRESS NOTES
4 Eyes Admission Assessment     I agree as the admission nurse that 2 RN's have performed a thorough Head to Toe Skin Assessment on the patient. ALL assessment sites listed below have been assessed on admission. Areas assessed by both nurses:   [x]   Head, Face, and Ears   [x]   Shoulders, Back, and Chest  [x]   Arms, Elbows, and Hands   [x]   Coccyx, Sacrum, and Ischium  [x]   Legs, Feet, and Heels        Does the Patient have Skin Breakdown?        Usha-area and buttocks bright red  Right thigh skin graft site with redness and scabbing  Scab/scrape to left lower back  Scattered abrasions and scabbing  Skin dry and flaky  Bruising to bilateral upper extremities  Skin tear to right elbow  Left heel scab  Right heel unstageable            Terrance Prevention initiated:  Yes   Wound Care Orders initiated:  NA--Wound Care Consulted    [address]  nurse consulted for Pressure Injury (Stage 3,4, Unstageable, DTI, NWPT, and Complex wounds) or Terrance score 18 or lower:  Yes      Nurse 1 eSignature: Electronically signed by Miladis Ross RN on 1/18/22 at 2:47 AM EST      Nurse 2 eSignature: Electronically signed by Chelle Olson RN on 1/18/22 at 2:39 AM EST

## 2022-01-18 NOTE — PROGRESS NOTES
Full report given to daughter ZEB who will up date her brother Nik Mueller, he is the  .  She will have him call  for plan of DC

## 2022-01-18 NOTE — CONSULTS
Nutrition Note    RECOMMENDATIONS:  PO Diet: continue current diet ADULT DIET; Dysphagia - Soft and Bite Sized; 5 carb choices (75 gm/meal); Low Fat/Low Chol/High Fiber/2 gm Na   ONS: Add Ensure BID     NUTRITION ASSESSMENT:   Nutritional summary & status: RD consult for poor po intake. AVA for weight loss considering cbw elevated from pt ubw per EMR. Tolerated 1 meal since admit and has consumed %. Various wounds noted with wound care and DPM following; wound care since signed off. RD will add ONS to aid w/enhancing skin integerity. Admission/PMH: Admitted from home with weakness, he was found to be bradycardic to 30's and hypothermic to 32.6C, placed on bear hugger. PMHx of bradycardia, carotid stenosis, Afib, CAD s/p CABG 2013   Nutrition Related Findings: lbm 1/18; Wounds: Multiple  Nutrition Goals: pt will tolerate PO diet to consume greater than 75% of meals and supplements offered to promote skin integrity. MALNUTRITION ASSESSMENT  Context of Malnutrition: Acute Illness   Malnutrition Status: Insufficient data    NUTRITION DIAGNOSIS   Increased nutrient needs related to increase demand for energy/nutrients as evidenced by wounds    CURRENT NUTRITION THERAPIES  ADULT DIET; Dysphagia - Soft and Bite Sized; 5 carb choices (75 gm/meal); Low Fat/Low Chol/High Fiber/2 gm Na     Additional Sources of Calories/IVF:n/a    PO Intake: %   PO Supplement Intake:None Ordered    ANTHROPOMETRICS  Current Height: 5' 11\" (180.3 cm)  Current Weight: 230 lb 6.1 oz (104.5 kg)    Admission weight: 227 lb (103 kg)   Ideal Body Weight (IBW): 172 lbs  (78 kg)    Usual Bodyweight 187 lb (84.8 kg)   Weight Changes weight gain noted      BMI: 32.2    The patient will still be monitored per nutrition standards of care. Consult dietitian if nutrition interventions essential to patient care is needed.      Sebastian Pina RD, LD:    Kevin: [phone]  Office:  [phone]

## 2022-01-18 NOTE — PROGRESS NOTES
Physical Therapy    Facility/Department: [place] [address] [facility]  Initial Assessment and treatment    NAME: Chelo Boone  : 1940  MRN: [id number]    Date of Service: 2022    Discharge Recommendations:    Chelo Boone scored a  on the AM-PAC short mobility form. Current research shows that an AM-PAC score of 17 or less is typically not associated with a discharge to the patient's home setting. Based on the patient's AM-PAC score and their current functional mobility deficits, it is recommended that the patient have 3-5 sessions per week of Physical Therapy at d/c to increase the patient's independence. Please see assessment section for further patient specific details. PT Equipment Recommendations  Equipment Needed: No (defer to next level of care)    Assessment   Body structures, Functions, Activity limitations: Increased pain;Decreased functional mobility ; Decreased balance;Decreased strength;Decreased safe awareness;Decreased endurance  Assessment: Patient tolerated session fair with mobility being limited due to weakness and overall decreased activity tolerance. Patient able to transfer to EOB and to  [address] [place] [address] stedy but shows significant fatigue with static standing to allow for pericare as patient incontinent of bowel movement. Patient is from home where he lives alone. Appears that patient has been having increased difficulty caring for himself since discharge from hospital in December and has limited support at home. Patient unable to verbalize how he uses the bathroom at home but does report he is unable to get into shower. Patient currently limited in functional mobility; recommend continued skilled PT upon discharge. Will follow while in acute care setting to improve functional mobility. Treatment Diagnosis: impaired mobility associated with sick sinus syndrome  Prognosis: Guarded  Decision Making: Medium Complexity  PT Education: PT Role;Goals; General Safety; Functional Mobility Training;Plan of Care;Transfer Training  Patient Education: patient would benefit from continued re-inforcement  REQUIRES PT FOLLOW UP: Yes  Activity Tolerance  Activity Tolerance: Patient limited by fatigue;Patient limited by endurance       Patient Diagnosis(es): The primary encounter diagnosis was Symptomatic bradycardia. A diagnosis of Sick sinus syndrome [facility]) was also pertinent to this visit. has a past medical history of BPH (benign prostatic hyperplasia), Carotid stenosis, Cellulitis, Chronic back pain, Diverticular disease, Erectile dysfunction, GERD (gastroesophageal reflux disease), History of atrial fibrillation, Hypercholesteremia, Hypertension, Lower GI bleed, MDRO (multiple drug resistant organisms) resistance, Neuromuscular disorder ([address] Renal insufficiency, Risk for falls, Tinea corporis, and Vitamin B12 deficiency. 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 Restrictions  Position Activity Restriction  Other position/activity restrictions: up with assist  Vision/Hearing  Vision: Impaired  Hearing: Within functional limits     Subjective  General  Chart Reviewed: Yes  Patient assessed for rehabilitation services?: Yes  Additional Pertinent Hx: PMH: hypertension, hyperlipidemia, paroxysmal A. fib, BPH. Presented to ED with weakness, fatigue and poor PO intake. Response To Previous Treatment: Not applicable  Family / Caregiver Present: No  Referring Practitioner: Lopez Montes DO  Referral Date : 01/17/22  Diagnosis: sick sinus syndrome  Follows Commands: Within Functional Limits  General Comment  Comments: Patient supine in bed upon arrival.  Subjective  Subjective: Patient agreeable to PT evaluation.   Pain Screening  Patient Currently in Pain: Yes (reports chronic back pain, 5-6/10, RN aware)  Vital Signs  Patient Currently in Pain: Yes (reports chronic back pain, 5-6/10, RN aware)       Orientation  Orientation  Overall Orientation Status: Within Functional Limits  Social/Functional History  Social/Functional History  Lives With: Alone  Type of Home: House  Home Layout: One level  Home Access: Level entry,Ramped entrance  Bathroom Equipment: 3-in-1 commode  Home Equipment: [address] Help From: Family  ADL Assistance: Needs assistance (pt indep with sponge bathing and dressing previously 12/29. Since pt has not washed and with minimal self dressing, EMS helped with pants per report.)  Homemaking Assistance: Needs assistance (dtr completes cleaning and groceries, pt claims homeaid is supposed to be coming, unclear how often \"everyday\" but then says 2 days a week.)  Homemaking Responsibilities: No  Ambulation Assistance: Independent (pt reports independent with wheelchair)  Transfer Assistance: Independent (reports independent with squat pivot from lift chair to [facility]. Likely has transferred minimally since last hospital visit. 12/29)  Active : No  Patient's  Info: Transportation service  Additional Comments: pt reports fall a few days ago,occured when transferring from lift chair to [facility]. It is unlikely pt has completed bathing or complete dressing since last visit to [facility]. Pt reports \"EMS helped me put pants on, I only have a shirt on\" when asked if he can get dressed on his own. Cognition   Cognition  Overall Cognitive Status: Exceptions  Following Commands: Follows one step commands consistently; Follows one step commands with increased time; Follows one step commands with repetition  Attention Span: Attends with cues to redirect  Memory: Decreased recall of recent events;Decreased short term memory  Safety Judgement: Decreased awareness of need for assistance  Insights: Decreased awareness of deficits  Initiation: Requires cues for some  Sequencing: Requires cues for some    Objective          PROM RLE (degrees)  RLE PROM: WFL  AROM RLE (degrees)  RLE AROM: WFL  PROM LLE (degrees)  LLE PROM: WFL  AROM LLE (degrees)  LLE AROM : WFL  Strength RLE  Strength RLE: Exception  Comment: appears grossly weakened with functional mobility  Strength LLE  Strength LLE: Exception  Comment: appears grossly weakened with functional mobility        Bed mobility  Supine to Sit: Stand by assistance (slow and effortful, head of bed elevated, use of bedrail)  Sit to Supine: Dependent/Total (total assist x 2)  Scooting: Dependent/Total (total assist x 2 to scoot toward head of bed)  Transfers  Sit to Stand: Contact guard assistance (from EOB to oral stedy)  Stand to sit: Contact guard assistance (to EOB from oral stedy, decreased eccentric control)  Bed to Chair: Dependent/Total (via oral stedy)  Ambulation  Ambulation?: No (unable at this time)     Balance  Sitting - Static: Fair;+ (SBA to sit EOB)  Standing - Static: Poor (initially CGA progressing to mod assist in oral stedy due to increased fatigue)        Plan   Plan  Times per week: 2-5  Current Treatment Recommendations: Strengthening,Safety Education & Daralyn Hazy Training,Patient/Caregiver Education & Training,Functional Mobility Training,Transfer Training  Safety Devices  Type of devices: Nurse notified,Bed alarm in place,Call light within reach,Gait belt,Left in bed      OutComes Score                                                  AM-PAC Score  AM-PAC Inpatient Mobility Raw Score : 12 (01/18/22 1315)  AM-PAC Inpatient T-Scale Score : 35.33 (01/18/22 1315)  Mobility Inpatient CMS 0-100% Score: 68.66 (01/18/22 1315)  Mobility Inpatient CMS G-Code Modifier : CL (01/18/22 1315)          Goals  Short term goals  Time Frame for Short term goals: discharge  Short term goal 1: patient will perform all bed mobility with min assist  Short term goal 2: patient will perform transfers sit<>stand with SBA  Short term goal 3: patient will perform transfers bed<>chair with moderate assistance via stand pivot  Patient Goals   Patient goals : none stated       Therapy Time   Individual Concurrent Group Co-treatment   Time In 0819         Time Out 0900         Minutes 41         Timed Code Treatment Minutes: 26 Minutes    Timed Code Treatment Minutes:  26 Minutes    Total Treatment Minutes:    26 minutes treatment + 15 minutes evaluation = 41 total treatment minutes  If patient discharges prior to next session this note will serve as a discharge summary. Please see below for the latest assessment towards goals.    [address]

## 2022-01-18 NOTE — PROGRESS NOTES
Occupational Therapy   Occupational Therapy Initial Assessment  Date: 2022   Patient Name: Urvashi Angelo  MRN: [id number]     : 1940    Date of Service: 2022    Discharge Recommendations:    Urvashi Angelo scored a 13/24 on the AM-PAC ADL Inpatient form. Current research shows that an AM-PAC score of 17 or less is typically not associated with a discharge to the patient's home setting. Based on the patient's AM-PAC score and their current ADL deficits, it is recommended that the patient have 3-5 sessions per week of Occupational Therapy at d/c to increase the patient's independence. Please see assessment section for further patient specific details. If patient discharges prior to next session this note will serve as a discharge summary. Please see below for the latest assessment towards goals. Assessment   Performance deficits / Impairments: Decreased functional mobility ; Decreased ADL status; Decreased safe awareness;Decreased endurance;Decreased balance  Assessment: pt is an [de-identified] y.o. male presenting below baseline requiring spv-total assist for bed mobility and CG assist for sit to  oral stedy frame. Pt with decreased initiation and requires assist for all standing ADLs. It is likely pt has not been able to complete bathing or dressing at home on his own. Per report pt able to transfer from [facility] to [facility] however with recent fall during transfer. Pt would benefit from IP OT services at [place] for increased participation in ADLs and functional mobility at [place].   Treatment Diagnosis: functional mobility deficit  Prognosis: Fair  Decision Making: Medium Complexity  OT Education: OT Role;Plan of Care;Transfer Training;ADL Adaptive Strategies  Patient Education: needs reinforcing  REQUIRES OT FOLLOW UP: Yes  Activity Tolerance  Activity Tolerance: Patient limited by fatigue;Patient Tolerated treatment well  Activity Tolerance: limited by decreased endurance  Safety Devices  Safety Devices in place: Yes  Type of devices: Nurse notified; Bed alarm in place;Call light within reach; Left in bed           Patient Diagnosis(es): The primary encounter diagnosis was Symptomatic bradycardia. A diagnosis of Sick sinus syndrome [facility]) was also pertinent to this visit. has a past medical history of BPH (benign prostatic hyperplasia), Carotid stenosis, Cellulitis, Chronic back pain, Diverticular disease, Erectile dysfunction, GERD (gastroesophageal reflux disease), History of atrial fibrillation, Hypercholesteremia, Hypertension, Lower GI bleed, MDRO (multiple drug resistant organisms) resistance, Neuromuscular disorder ([address] Renal insufficiency, Risk for falls, Tinea corporis, and Vitamin B12 deficiency. 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 Treatment Diagnosis: functional mobility deficit      Restrictions  Position Activity Restriction  Other position/activity restrictions: up with assist    Subjective   General  Chart Reviewed: Yes  Patient assessed for rehabilitation services?: Yes  Additional Pertinent Hx: PMH: hypertension, hyperlipidemia, paroxysmal A. fib, BPH. Presented to ED with weakness, fatigue and poor PO intake. Family / Caregiver Present: No  Referring Practitioner: Armando Gregg  Diagnosis: sick sinus syndrome  Pain: general pain in lower back    Social/Functional History  Social/Functional History  Lives With: Alone  Type of Home: House  Home Layout: One level  Home Access: Level entry,Ramped entrance  Bathroom Equipment: 3-in-1 commode  Home Equipment: [address] Help From: Family  ADL Assistance: Needs assistance (pt indep with sponge bathing and dressing previously 12/29.  Since pt has not washed and with minimal self dressing, EMS helped with pants per report.)  Homemaking Assistance: Needs assistance (dtr completes cleaning and groceries, pt claims homeaid is supposed to be coming, unclear how often \"everyday\" but then says 2 days a week.)  Homemaking Responsibilities: No  Ambulation Assistance: Independent (pt reports independent with wheelchair)  Transfer Assistance: Independent (reports independent with squat pivot from lift chair to [address]. Likely has transferred minimally since last hospital visit. 12/29)  Active : No  Patient's  Info: Transportation service  Additional Comments: pt reports fall a few days ago,occured when transferring from lift chair to [facility]. It is unlikely pt has completed bathing or complete dressing since last visit to [facility]. Pt reports \"EMS helped me put pants on, I only have a shirt on\" when asked if he can get dressed on his own. Objective   Vision: Impaired  Hearing: Within functional limits    Orientation  Overall Orientation Status: Within Functional Limits     Balance  Sitting Balance: Moderate assistance (~5 min EOB, fluctuating for CGA to mod assist.)  Standing Balance: Moderate assistance  Standing Balance  Time: ~6 min total  Activity: stance in oral stedy frame for pericare, supported  oral stedy  Comment: mod assist for stand during toileting cleanup, CGA with supported  oral stedy. ADL  Grooming: Stand by assistance (sitting EOB, washed face.)  LE Dressing: Dependent/Total  Toileting: Dependent/Total  Additional Comments: pt dependent for incont. of bowels.   Tone RUE  RUE Tone: Normotonic  Tone LUE  LUE Tone: Normotonic     Bed mobility  Supine to Sit: Stand by assistance (slow and effortful, HOB raised, use of rails)  Sit to Supine: Dependent/Total (assist of two)  Transfers  Sit to stand: Contact guard assistance  Stand to sit: Contact guard assistance  Transfer Comments: in oral stedy frame     Cognition  Overall Cognitive Status: Exceptions  Following Commands: Follows one step commands consistently; Follows one step commands with increased time; Follows one step commands with repetition  Attention Span: Attends with cues to redirect  Memory: Decreased recall of recent events;Decreased short term memory  Safety Judgement: Decreased awareness of need for assistance  Insights: Decreased awareness of deficits  Initiation: Requires cues for some  Sequencing: Requires cues for some                 LUE AROM (degrees)  LUE AROM : WFL  RUE AROM (degrees)  RUE AROM : WFL  LUE Strength  LUE Strength Comment: not formally assessed however with general decreased UE endurance.                    Plan   Plan  Times per week: 2-5  Times per day: Daily  Current Treatment Recommendations: Functional Mobility Training,Endurance Training,Balance Training,Self-Care / ADL,Safety Education & Training,Patient/Caregiver Education & Training                                                      AM-PAC Score        AM-[facility] Inpatient Daily Activity Raw Score: 13 (01/18/22 1229)  AM-PAC Inpatient ADL T-Scale Score : 32.03 (01/18/22 1229)  ADL Inpatient CMS 0-100% Score: 63.03 (01/18/22 1229)  ADL Inpatient CMS G-Code Modifier : CL (01/18/22 1229)    Goals  Short term goals  Time Frame for Short term goals: DC  Short term goal 1: pt will complete sit to stand with RW with CGA-not met  Short term goal 2: pt will complete BSC transfer with min a-not met  Short term goal 3: pt will increase sitting tolerance to 10 min EOB-not met  Patient Goals   Patient goals : not stated       Therapy Time   Individual Concurrent Group Co-treatment   Time In 0819         Time Out 0904         Minutes 45           Timed code tx minutes:30  Total tx minutes:45         Dre Ga OT

## 2022-01-18 NOTE — CONSULTS
Consulted for MASD on buttocks and rich-area, skin tear R AC, recommend zinc paste on buttocks, turn pt every 2 hours, R AC foam dressing change every 3 days  Wound Care to sign off. Please re-consult for changes or deterioration.

## 2022-01-18 NOTE — PLAN OF CARE
Problem: Pain:  Description: Pain management should include both nonpharmacologic and pharmacologic interventions. Goal: Pain level will decrease  Description: Pain level will decrease  Outcome: Met This Shift  Note: C/o leg pain when moved     Problem: Pain:  Description: Pain management should include both nonpharmacologic and pharmacologic interventions. Goal: Control of acute pain  Description: Control of acute pain  Outcome: Met This Shift     Problem: Pain:  Description: Pain management should include both nonpharmacologic and pharmacologic interventions. Goal: Control of chronic pain  Description: Control of chronic pain  Outcome: Met This Shift     Problem: Skin Integrity:  Goal: Will show no infection signs and symptoms  Description: Will show no infection signs and symptoms  Outcome: Met This Shift  Note: Multiple skin issues, rich rectal very excoriated IAD, incont, of stool, q2hr turns when pt allows, on speciality mattress, rt. Elbow dsg intact , skin abrasion/tears; rt. Upper thigh skin graft site, to rt. Outer ankle area , dsd intact, scattered bruisesalong rt.  Side; knee's down 3+ edema red, very dry skin T.O. even face, wound consult with wound care nurse placed  POD: seeing pt for his legs see their notes, he is well know to them     Problem: Skin Integrity:  Goal: Absence of new skin breakdown  Description: Absence of new skin breakdown  Outcome: Met This Shift     Problem: Falls - Risk of:  Goal: Will remain free from falls  Description: Will remain free from falls  Outcome: Met This Shift  Note: Side rails up x 2 call light in reach bed in low position bed alarm on  call light in reach     Problem: Falls - Risk of:  Goal: Absence of physical injury  Description: Absence of physical injury  Outcome: Met This Shift     Problem: Discharge Planning:  Goal: Discharged to appropriate level of care  Description: Discharged to appropriate level of care  Outcome: Met This Shift  Note: Return to facility when medically stable

## 2022-01-18 NOTE — PROGRESS NOTES
Speech Language Pathology  Facility/Department: [place] [address] [facility]   CLINICAL BEDSIDE SWALLOW EVALUATION & TREATMENT    NAME: Tru Pastrana  : 1940  MRN: [id number]    ADMISSION DATE: 2022  ADMITTING DIAGNOSIS: has Hypotension; Light headed; Cellulitis; Essential hypertension; GERD (gastroesophageal reflux disease); Acute blood loss anemia; Tinea corporis; Carotid stenosis; CAD (coronary artery disease); S/P CABG x 5; Lower GI bleeding; Hip fracture, right ([address] Acute right hip pain; Acute low back pain without sciatica; Hypothermia; Complicated UTI (urinary tract infection); Edema; Problem with urinary catheter ([address] Acute encephalopathy; Chronic indwelling Iglesias catheter; Hyperlipidemia; Bilateral lower leg cellulitis; Neurogenic bladder; Bacteriuria; Abnormality of urethral meatus; Gross hematuria; CHF with unknown LVEF ([address] Dyspnea; Bradycardia; Pseudomonas infection; Acute renal injury ([address] Cellulitis of scrotum; Constipation; Encopresis with constipation and overflow incontinence; Abnormal stress test; H/O angiography; Abnormal angiogram; Acute cystitis with hematuria; Acute renal failure superimposed on stage 3 chronic kidney disease ([address] Urinary tract infection associated with indwelling urethral catheter ([address] Encephalopathy acute; Altered mental status; Hyperkalemia; Leg laceration, unspecified laterality, initial encounter; Pain of right lower extremity; Degloving injury; and Weakness on their problem list.  ONSET DATE:  22    Recent Chest Xray/CT of Chest: (22)  Impression       Mild chronic bilateral interstitial opacities and small chronic left pleural effusion, similar in appearance compared to prior. Date of Eval: 2022  Evaluating Therapist: Adilene Moon, SLP    Current Diet level:  Current Diet : Regular  Current Liquid Diet : Thin    Primary Complaint  Patient Complaint: Pt reports \"cough that builds\" when drinking liquids, for about 1 week.     Pain:  Pain Verbal bedside report given to ana Nguyễn RN. Patient's situation, background, assessment and recommendations provided. Opportunity for questions provided. Oncoming RN assumed care of patient. Right groin site visualized. Home medications brought to hospital by wife and sent to pharmacy for verification at this time. Assessment  Pain Assessment: 0-10  Pain Level: 0  Patient's Stated Pain Goal: No pain  Pain Location: Back  Pain Orientation: Lower  Pain Descriptors: Burning  Pain Frequency: Continuous  Non-Pharmaceutical Pain Intervention(s): Repositioned    Reason for Referral  Josefa Jett was referred for a bedside swallow evaluation to assess the efficiency of his swallow function, identify signs and symptoms of aspiration and make recommendations regarding safe dietary consistencies, effective compensatory strategies, and safe eating environment. Admitting H&P (1/17/22):  [de-identified] y.o. male who presented to [facility] with past medical history of hypertension, hyperlipidemia, paroxysmal A. fib, BPH presented to the ED with a chief complaint of weakness and fatigue. Patient lives alone at home, has a chronic Iglesias reported that he has been having worsening generalized fatigue and weakness with decreased oral intake. Patient reports that he normally has home health come to the house to help him however today they have not came and thus has not been anything today. Patient reported that weakness has been going on for the past few days today is much worse than prior no known alleviating exacerbating factor not associated with fever chills nausea vomiting chest pain shortness of breath abdominal pain dysuria or cough. Patient is only pain is chronic sacral ulcer pain that is chronic for him. Patient also has history of bradycardia has followed up with cardiology on 08/31/2021. Patient otherwise is requesting food otherwise not really any complaints of any symptoms nausea vomiting. Patient reports that he has chronic weakness and he is wheelchair-bound for many months now. Patient in the ED noted to have bradycardia going as low as 30s. Requested admission for observation for bradycardia and generalized weakness. Impression  Dysphagia Diagnosis: Moderate oral stage dysphagia;Mild pharyngeal stage dysphagia; Suspected needs further assessment  Dysphagia Impression:  Moderate oral stage dysphagia and mild pharyngeal stage dysphagia. Pt demonstrates compromised mastication due to many missing teeth. Pt reports that he has a \"plate\" but it doesn't fit. Pt is agreeable to recommendation for Dysphagia Soft and Bite-Sized diet texture. Pt demonstrates inconsistent clinical s/s aspiration (occurs with initial straw sip, but does not occur when taking 3 oz via straw) with thin liquids. No clinical s/s aspiration observed with puree or solids. ST will follow. Recommend Dysphagia Soft and Bite-Sized texture and Thin Liquids at this time. Further assessment indicated to determine whether pt will benefit from instrumental swallow assessment. Dysphagia Outcome Severity Scale: Level 5: Mild dysphagia- Distant supervision. May need one diet consistency restricted     Treatment Plan  Requires SLP Intervention: Yes  Duration/Frequency of Treatment: 2-3x/week x LOS or until goals met  D/C Recommendations: To be determined    Recommended Diet and Intervention  Diet Solids Recommendation: Dysphagia Soft and Bite-Sized (Dysphagia III)  Liquid Consistency Recommendation: Thin  Recommended Form of Meds: PO  Recommendations: Dysphagia treatment  Therapeutic Interventions: Patient/Family education;Diet tolerance monitoring;Oral care    Compensatory Swallowing Strategies  Upright as possible for all oral intake;  Eat/Feed slowly; Small bites/sips; Stop eating/drinking if clincal s/s aspiration develop    Treatment/Goals  Goal 1: The patient/caregiver will demonstrate understanding of recommendations for improved swallowing safety. 1/18/22:  SLP educated pt re: role of SLP, anatomy and physiology of swallow, s/s aspiration to report, risks of aspiration, and diet/POC recommendations. Pt verbalized comprehension but will benefit from ongoing reinforcement.   Continue goal.      Goal 2: The patient will tolerate recommended diet without observed clinical signs of aspiration. Goal 3:  The patient will tolerate instrumental swallowing procedure. General  Chart Reviewed: Yes  Behavior/Cognition: Alert; Cooperative;Pleasant mood  Temperature Spikes Noted: No (no, using kole hugger)  Respiratory Status: Room air  Breath Sounds: Clear;Diminished; Expiratory wheeze  O2 Device: None (Room air)  Communication Observation: Functional  Follows Directions: Simple  Dentition: Some missing teeth (Many missing teeth, reports that he has a \"plate\" but it doesn't fit.)  Patient Positioning: Upright in bed  Baseline Vocal Quality: Normal  Prior Dysphagia History:   [facility] 2/18/2021:    Oral Phase: Mild oral deficits characterized by weak lingual manipulation resulting in premature spillage to level of valleculae w/ all consistencies; mildly delayed swallow initiation and intermittent cues required to swallow. Piecemeal swallow noted x2 w/ thin liquids; statis of valleculae and cleared w/ secondary swallow. Pharyngeal: Mild pharyngeal phase characterized by vallecular residue that required secondary swallow to clear. No penetration or aspiration w/ any consistencies or trials. No pharyngeal residue. Consistencies Administered: Ice Chips; Thin - straw;Dysphagia Pureed (Dysphagia I); Dysphagia Soft and Bite-Sized (Dysphagia III)    Vision/Hearing  Vision  Vision: Impaired  Hearing  Hearing: Within functional limits    Oral Motor Deficits  Oral/Motor  Oral Motor: Within functional limits    Oral Phase Dysfunction  Oral Phase  Oral Phase: WFL  Oral Phase  Oral Phase - Comment:  Compromised mastication due to many missing teeth. Pt reports that he has a \"plate\" but it doesn't fit. Pt is agreeable to recommendation for Dysphagia Soft and Bite-Sized diet texture. Indicators of Pharyngeal Phase Dysfunction  Pharyngeal Phase  Pharyngeal Phase: Exceptions  Indicators of Pharyngeal Phase Dysfunction  Cough - Immediate:  Thin - straw (Inconsistent: occurs with initial straw sip, but does not occur when taking 3 oz via straw)  Pharyngeal Phase   Pharyngeal Phase - Comment:  Inconsistent clinical s/s aspiration (occurs with initial straw sip, but does not occur when taking 3 oz via straw). with thin liquids. No clinical s/s aspiration with puree or solids. Prognosis  Prognosis  Prognosis for safe diet advancement: good  Individuals consulted  Consulted and agree with results and recommendations: Patient;RN    Education  Patient Education: SLP educated pt re: role of SLP, assessment results and POC. Patient Education Response: Verbalizes understanding  Safety Devices in place: Yes  Type of devices: All fall risk precautions in place;Nurse notified       Therapy Time  SLP Individual Minutes  Time In: 1210  Time Out: 2683  Minutes: 25            Plan  Dysphagia tx 2-3x/week x LOS or until goals met  Diet Recommendations: Dysphagia Soft and Bite-Sized texture and Thin Liquids  Pt may benefit from instrumental swallow assessment. Discharge Plan:  TBD  Discussed with RN, Sukh Herron. Needs within reach. Electronically Signed by:  Som Bolivar., [address] [id number]  Pager #[phone]  This document will serve as a discharge summary if pt discharges before next treatment.    1/18/2022 12:48 PM

## 2022-01-18 NOTE — PROGRESS NOTES
Speech Language Pathology - Contact Note  Order received and chart reviewed. Spoke with RN, who indicates that pt is receiving would care at this time. Will re-attempt at a later time. Please page ST department with any concerns.     Electronically Signed by:  Rivera Aragon., [address] [facility]  Speech-Language Pathologist  [phone]  Pager #[phone]

## 2022-01-18 NOTE — PROGRESS NOTES
Hospitalist Progress Note      PCP: INES BLANDON [address] [address]    Date of Admission: 2022    Chief Complaint on Admission: weakness    Pt Seen/Examined and Chart Reviewed. Admitting dx bradycardia, hypothermia    SUBJECTIVE/OBJECTIVE:   Patient admitted from home with weakness, he was found to be bradycardic to 30's and hypothermic to 32.6C, placed on bear hugger. Today he is feeling better. HR in normal range. Still on bear hugger. C/o chest congestion with phlegm. Eating and coughing. Allergies  Bactrim [sulfamethoxazole-trimethoprim]    Medications      Scheduled Meds:   guaiFENesin  600 mg Oral BID    albuterol  2.5 mg Nebulization TID    sodium chloride flush  10 mL IntraVENous 2 times per day    aspirin  81 mg Oral Daily    [Held by provider] atorvastatin  80 mg Oral Nightly    gabapentin  600 mg Oral BID    pantoprazole  40 mg Oral QAM AC    tamsulosin  0.8 mg Oral Daily    furosemide  40 mg Oral BID       Infusions:   sodium chloride         PRN Meds:  sodium chloride flush, sodium chloride, potassium chloride, magnesium sulfate, promethazine **OR** ondansetron, acetaminophen **OR** acetaminophen, Venelex, dicyclomine, docusate sodium, melatonin    Vitals    TEMPERATURE:  Current - Temp: 97.8 °F (36.6 °C); Max - Temp  Av.8 °F (34.3 °C)  Min: 90.7 °F (32.6 °C)  Max: 99 °F (37.2 °C)  RESPIRATIONS RANGE: Resp  Av  Min: 7  Max: 19  PULSE RANGE: Pulse  Av  Min: 33  Max: 84  BLOOD PRESSURE RANGE:  Systolic (86VFF), QQY:518 , Min:99 , FCV:353   ; Diastolic (83NRS), PUW:56, Min:43, Max:91    PULSE OXIMETRY RANGE: SpO2  Av.6 %  Min: 95 %  Max: 100 %  24HR INTAKE/OUTPUT:      Intake/Output Summary (Last 24 hours) at 2022 1005  Last data filed at 2022 0949  Gross per 24 hour   Intake 170 ml   Output 450 ml   Net -280 ml       Exam:      General appearance: No apparent distress, appears stated age and cooperative.   Lungs: bilateral rhonchi presnt  Heart: Regular rate and rhythm with Normal S1/S2 without  murmurs, rubs or gallops, point of maximum impulse non-displaced  Abdomen: Soft, non-tender or non-distended without rigidity or guarding and positive bowel sounds all four quadrants. Extremities: 1+ pitting leg edema  Skin: venous stasis  Neurologic: Alert and oriented X 3,  Chronic lower leg paralysis  Mental status: Alert, oriented, thought content appropriate. Data    Recent Labs     01/17/22  1710 01/18/22  0202 01/18/22 0618   WBC 4.7 5.0 4.9   HGB 9.7* 8.9* 8.8*   HCT 30.7* 27.7* 28.0*    158 154      Recent Labs     01/17/22  1710 01/18/22  0618    137   K 4.7 4.6    105   CO2 25 26   BUN 32* 29*   CREATININE 1.1 1.0     Recent Labs     01/18/22  0618   AST 20   ALT 13   BILITOT <0.2   ALKPHOS 123     No results for input(s): INR in the last 72 hours. Recent Labs     01/17/22 1710 01/17/22 2002   TROPONINI 0.03* 0.03*       Consults:     IP CONSULT TO HOSPITALIST  IP CONSULT TO CARDIOLOGY  IP CONSULT TO SOCIAL WORK  IP CONSULT TO DIETITIAN  IP CONSULT TO INFECTIOUS DISEASES    Active Hospital Problems    Diagnosis Date Noted    Weakness [R53.1] 01/17/2022    Hypothermia [T68. XXXA] 01/03/2019         ASSESSMENT AND PLAN      Bradycardia with hypothermia:  Not on remington agents at baseline  TSH pending  Cardiology consulted  Monitor on tele  Ruling out sepsis- ID consulted    Possible dysphagia:  Patient reports persistent cough   Will get SLP eval  Mucinex, acapella, albuterol nebs  CXR- negative for acute infiltrates    wound care consult    Anemia work up    PT/OT SW consult    DVT Prophylaxis: SCD  Diet: ADULT DIET; Regular; 5 carb choices (75 gm/meal);  Low Fat/Low Chol/High Fiber/2 gm Na  Code Status: Full Code    PT/OT Eval Status:pending    Dispo - changed to inpatient due to hemodynamic instability/hypothermia    Akila Cloud MD

## 2022-01-18 NOTE — H&P
Hospital Medicine History & Physical      PCP: INES BLANDON [address]    Date of Admission: 1/17/2022    Date of Service: Pt seen/examined on 1/17/2022  Pt seen/examined face to face on and admitted as observation with expected LOS less than two midnights but that can change depending on respose to medical therapy and clinical progress. Chief Complaint:    Chief Complaint   Patient presents with    Fatigue        History Of Present Illness:      [de-identified] y.o. male who presented to [facility] with past medical history of hypertension, hyperlipidemia, paroxysmal A. fib, BPH presented to the ED with a chief complaint of weakness and fatigue. Patient lives alone at home, has a chronic Iglesias reported that he has been having worsening generalized fatigue and weakness with decreased oral intake. Patient reports that he normally has home health come to the house to help him however today they have not came and thus has not been anything today. Patient reported that weakness has been going on for the past few days today is much worse than prior no known alleviating exacerbating factor not associated with fever chills nausea vomiting chest pain shortness of breath abdominal pain dysuria or cough. Patient is only pain is chronic sacral ulcer pain that is chronic for him. Patient also has history of bradycardia has followed up with cardiology on 08/31/2021. Patient otherwise is requesting food otherwise not really any complaints of any symptoms nausea vomiting. Patient reports that he has chronic weakness and he is wheelchair-bound for many months now. Patient in the ED noted to have bradycardia going as low as 30s. Requested admission for observation for bradycardia and generalized weakness.       Past Medical History:          Diagnosis Date    BPH (benign prostatic hyperplasia)     Carotid stenosis 12/2013    JESU 28-75% stenosis; LICA 86-92% stenosis    Cellulitis 12/2013    LLE    Chronic back pain  Diverticular disease     Erectile dysfunction     GERD (gastroesophageal reflux disease)     History of atrial fibrillation     Hypercholesteremia     Hypertension     Lower GI bleed     MDRO (multiple drug resistant organisms) resistance 10/26/2019    urine    Neuromuscular disorder (HCC)     spasticity    Renal insufficiency     Risk for falls     uses walker    Tinea corporis 12/2013    LLE    Vitamin B12 deficiency        Past Surgical History:          Procedure Laterality Date    BACK SURGERY  2006    lower lumbar    CORONARY ARTERY BYPASS GRAFT  12/13/2013    CABG x 5 (Dr Med Underwood), svg to diag, om1 and 3, distal rca, kelly to lad.  CYSTOSCOPY N/A 1/10/2019    CYSTOSCOPY performed by Megan Lebron MD at [facility] [address] Right 5/1/2015    ORIF    LEG SURGERY Right 11/2/2021    RIGHT LOWER EXTREMITY ADVANCEMENT FLAP AND SPLIT THICKNESS SKIN GRAFT PLACEMENT; (WOUND- 10 CM X 5.5 CM; CLOSURE- 6 CM X 5.5 CM; SKIN GRAFT- 7.2 CM X 5 CM) performed by Scooby Vincent MD at [address] [place] [address] 6/11/2020    [facility] performed by Mina Viera MD at [facility] 5/4/2020    EGD BIOPSY performed by Mina Viera MD at [place] [address]       Medications Prior to Admission:      Prior to Admission medications    Medication Sig Start Date End Date Taking?  Authorizing Provider   docusate sodium (COLACE) 100 MG capsule Take 1 capsule by mouth daily as needed for Constipation 9/26/21   Polo Ribeiro MD   furosemide (LASIX) 40 MG tablet TAKE 1 TABLET TWICE DAILY 9/23/21   TIM Swartz - CNP   Balsam [place]-Castor Oil (VENELEX) OINT ointment Apply topically daily as needed    Historical Provider, MD   docusate sodium (COLACE) 100 MG capsule Take 100 mg by mouth daily as needed for Constipation    Historical Provider, MD   gabapentin (NEURONTIN) 600 MG tablet Take 600 mg by mouth 2 times daily. 5/13/21   Historical Provider, MD   amLODIPine (NORVASC) 5 MG tablet Take 1 tablet by mouth daily 4/24/21   Duane Sánchez MD   dicyclomine (BENTYL) 20 MG tablet Take 20 mg by mouth 3 times daily as needed 12/3/20   Historical Provider, MD   vitamin B-12 (CYANOCOBALAMIN) 1000 MCG tablet Take 1,000 mcg by mouth daily    Historical Provider, MD   aspirin 81 MG chewable tablet Take 1 tablet by mouth daily 5/7/20   Claudell Fleck, MD   pantoprazole (PROTONIX) 40 MG tablet Take 1 tablet by mouth every morning (before breakfast) 5/6/20   Claudell Fleck, MD   tamsulosin (FLOMAX) 0.4 MG capsule Take 0.8 mg by mouth daily     Historical Provider, MD   Melatonin 10 MG TABS Take 10 mg by mouth nightly as needed     Historical Provider, MD   atorvastatin (LIPITOR) 80 MG tablet Take 80 mg by mouth nightly. Historical Provider, MD   zolpidem (AMBIEN) 10 MG tablet Take 10 mg by mouth nightly as needed for Sleep. Historical Provider, MD       Allergies:  Bactrim [sulfamethoxazole-trimethoprim]    Social History:          TOBACCO:   reports that he quit smoking about 52 years ago. He has never used smokeless tobacco.  ETOH:   reports no history of alcohol use.   E-Cigarettes/Vaping Use     Questions Responses    E-Cigarette/Vaping Use Never User    Start Date     Passive Exposure     Quit Date     Counseling Given     Comments             Family History:      Reviewed in detail, and noncontributory        Problem Relation Age of Onset    Heart Disease Father        REVIEW OF SYSTEMS:     Constitutional:  No Fever, No Chills, No Night Sweats  ENT/Mouth:  No Nasal Congestion,  No Hoarseness, No new mouth lesion  Eyes:  No Eye Pain, No Redness, No Discharge  Cardiovascular:  No Chest Pain, No Orthopnea, No Palpitations  Respiratory:  No Cough, No Sputum, No Dyspnea  Gastrointestinal: No Vomiting, No Diarrhea, No abdominal pain  Genitourinary: No Urinary Frequency, No Hematuria, No Urinary pain  Musculoskeletal: No worsening Arthralgias, No worsening Myalgias  Skin: + Skin Lesions, + skin rash  Neuro:+ weakness, No new numbness. Psych:  No suicial ideation, No Violence ideation    PHYSICAL EXAM PERFORMED:    BP (!) 117/54   Pulse (!) 43   Temp 99 °F (37.2 °C) (Oral)   Resp 10   Ht 5' 11\" (1.803 m)   Wt 227 lb (103 kg)   SpO2 99%   BMI 31.66 kg/m²     General appearance:  mild acute distress, appears older than stated age  HEENT:   atraumatic, sclera anicteric, Conjunctivae clear. Neck: Supple,Trachea midline, no goiter  Respiratory:minimal accessory muscle usage, Normal respiratory effort. Clear to auscultation, bilaterally without wheezing  Cardiovascular:  Regular rate and rhythm, capillary refill 2 seconds  Abdomen: Soft, non-tender, non-distended with normal bowel sounds. Chronic Iglesias present  Musculoskeletal:  No clubbing, cyanosis. 2+ edema LE bilaterally. ulcer  Skin: turgor normal.  No new rashes or lesions. Onychomycosis, different lesions present  Neurologic: Alert and oriented x4, no new focal sensory/motor deficits. Labs:     Recent Labs     01/17/22  1710   WBC 4.7   HGB 9.7*   HCT 30.7*        Recent Labs     01/17/22  1710      K 4.7      CO2 25   BUN 32*   CREATININE 1.1   CALCIUM 8.7     No results for input(s): AST, ALT, BILIDIR, BILITOT, ALKPHOS in the last 72 hours. No results for input(s): INR in the last 72 hours.   Recent Labs     01/17/22  1710 01/17/22 2002   TROPONINI 0.03* 0.03*       Urinalysis:      Lab Results   Component Value Date    NITRU Negative 01/17/2022    WBCUA 10-20 01/17/2022    BACTERIA Rare 01/17/2022    RBCUA 0-2 01/17/2022    BLOODU TRACE-INTACT 01/17/2022    SPECGRAV 1.025 01/17/2022    GLUCOSEU Negative 01/17/2022       Radiology:     CXR: I have reviewed the CXR with the following interpretation:   Chronic interstitial opacities  EKG:  I have reviewed the EKG with the following interpretation:   Sinus bradycardia  XR CHEST PORTABLE Final Result      Mild chronic bilateral interstitial opacities and small chronic left pleural effusion, similar in appearance compared to prior.              ASSESSMENT AND PLAN:    Sinus bradycardia: Asymptomatic  EKG reviewed  Reported patient has been dipping in the 30s in the ED  Cardiology consulted, much appreciated    Normocytic anemia: Hemoccult, iron panel  Generalized weakness: PT OT  Chronic Iglesias: Asymptomatic, thus held antibiotic, urine culture pending  Skin lesions: Wound care    Chronic medical conditions:  Essential Hypertension: Continue home medication  Hyperlipidemia: Continue home medication  GERD: Continue home medication  SBP on chronic Iglesias: Tamsulosin ordered  Insomnia: Continue home medication  Constipation: Continue medication  Onychomycosis: Outpatient follow-up    Diet: NPO except meds ordered    DVT Prophylaxis: held    Dispo:   Expected LOS less than two DO Kassy

## 2022-01-18 NOTE — DISCHARGE INSTR - COC
Continuity of Care Form    Patient Name: Muriel Hagen   :  1940  MRN:  [id number]    Admit date:  2022  Discharge date:  ***    Code Status Order: Full Code   Advance Directives:      Admitting Physician:  Leanna Krause DO  PCP: iDana Garcia    Discharging Nurse: [facility] Unit/Room#: 4922/6200-80  Discharging Unit Phone Number: ***    Emergency Contact:   Extended Emergency Contact Information  Primary Emergency Contact: Qiniu Phone: [phone]  Work Phone: [phone]  Mobile Phone: [phone]  Relation: Child  Secondary Emergency Contact: Mehdi Barillas  Address: [address]  Home Phone: [phone]  Relation: Other    Past Surgical History:  Past Surgical History:   Procedure Laterality Date    BACK SURGERY      lower lumbar    CORONARY ARTERY BYPASS GRAFT  2013    CABG x 5 (Dr Haritha Reynoso), svg to diag, om1 and 3, distal rca, kelly to lad.      CYSTOSCOPY N/A 1/10/2019    CYSTOSCOPY performed by Jose Christy MD at [address] Right 2015    ORIF    LEG SURGERY Right 2021    RIGHT LOWER EXTREMITY ADVANCEMENT FLAP AND SPLIT THICKNESS SKIN GRAFT PLACEMENT; (WOUND- 10 CM X 5.5 CM; CLOSURE- 6 CM X 5.5 CM; SKIN GRAFT- 7.2 CM X 5 CM) performed by Veronica Dougherty MD at [place] [address] N/A 2020    [address] [place] [address] performed by Renetta Anderson MD at [address] [place] [address],[facility] Floor 2020    EGD BIOPSY performed by Renetta Anderson MD at [facility] ENDOSCOPY       Immunization History:   Immunization History   Administered Date(s) Administered    COVID-19, Maria Elena Saavedra, Primary or Immunocompromised, PF, 100mcg/0.5mL 2021    Influenza A (R1W6-81) Vaccine PF IM 12/10/2009    Influenza Vaccine, unspecified formulation 2012, 2014, 10/13/2015, 2016, 2017, 10/26/2018, 2019    Influenza Virus Vaccine 2005, 2006, 12/13/2012, 11/30/2013, 10/13/2015, 09/20/2019    Influenza Whole 10/01/2007, 09/02/2010, 09/01/2011, 12/13/2012, 10/13/2015    Influenza, High Dose (Fluzone 65 yrs and older) 11/06/2014, 10/20/2020    Influenza, Quadv, IM, PF (6 mo and older Fluzone, Flulaval, Fluarix, and 3 yrs and older Afluria) 11/04/2021    PPD Test 09/16/1997    Pneumococcal Conjugate 13-valent (Esuaghg81) 12/29/2014    Pneumococcal Conjugate 7-valent (Prevnar7) 12/29/2014    Pneumococcal Polysaccharide (Rkrctngej23) 01/27/2014       Active Problems:  Patient Active Problem List   Diagnosis Code    Hypotension I95.9    Light headed R42    Cellulitis L03.90    Essential hypertension I10    GERD (gastroesophageal reflux disease) K21.9    Acute blood loss anemia D62    Tinea corporis B35.4    Carotid stenosis I65.29    CAD (coronary artery disease) I25.10    S/P CABG x 5 Z95.1    Lower GI bleeding K92.2    Hip fracture, right ([facility]) S72.001A    Acute right hip pain M25.551    Acute low back pain without sciatica M54.50    Hypothermia T68. XXXA    Complicated UTI (urinary tract infection) N39.0    Edema R60.9    Problem with urinary catheter ([facility] [address] T83. 9XXA    Acute encephalopathy G93.40    Chronic indwelling Iglesias catheter Z97.8    Hyperlipidemia E78.5    Bilateral lower leg cellulitis L03.116, L03.115    Neurogenic bladder N31.9    Bacteriuria R82.71    Abnormality of urethral meatus Q64.70    Gross hematuria R31.0    CHF with unknown LVEF ([facility]) I50.9    Dyspnea R06.00    Bradycardia R00.1    Pseudomonas infection A49.8    Acute renal injury ([address] N17.9    Cellulitis of scrotum N49.2    Constipation K59.00    Encopresis with constipation and overflow incontinence R15.9    Abnormal stress test R94.39    H/O angiography Z92.89    Abnormal angiogram R93.89    Acute cystitis with hematuria N30.01    Acute renal failure superimposed on stage 3 chronic kidney disease (HCC) N17.9, N18.30    Urinary tract infection associated with indwelling urethral catheter ([facility] [address] T83.511A, N39.0    Encephalopathy acute G93.40    Altered mental status R41.82    Hyperkalemia E87.5    Leg laceration, unspecified laterality, initial encounter S81.819A    Pain of right lower extremity M79.604    Degloving injury T14. 8XXA    Weakness R53.1       Isolation/Infection:   Isolation            No Isolation          Patient Infection Status       Infection Onset Added Last Indicated Last Indicated By Review Planned Expiration Resolved Resolved By    None active    Resolved    COVID-19 (Rule Out) 01/17/22 01/17/22 01/17/22 COVID-19, Rapid (Ordered)   01/17/22 Rule-Out Test Resulted    COVID-19 (Rule Out) 01/17/22 01/17/22 01/17/22 COVID-19, Rapid (Ordered)   01/17/22 Rule-Out Test Resulted    MDRO (multi-drug resistant organism) 06/05/21 06/08/21 06/05/21 Culture, Urine   09/27/21 Armida Correa RN    New urine cx on 6/20/2021 with no growth/was negative    COVID-19 (Rule Out) 04/24/21 04/24/21 04/24/21 COVID-19, Rapid (Ordered)   04/24/21 Rule-Out Test Resulted    C-diff Rule Out 04/21/21 04/21/21 04/22/21 GI Bacterial Pathogens By PCR (Ordered)   04/22/21 Jonathan Mcelroy RN    Order noted to be discontinued by MD; not included in GI pathogens order    COVID-19 (Rule Out) 02/19/21 02/19/21 02/19/21 COVID-19, Rapid (Ordered)   02/19/21 Rule-Out Test Resulted    C-diff Rule Out 12/13/20 12/13/20 12/13/20 Clostridium difficile toxin/antigen (Ordered)   02/16/21 Arvind Israel RN    No stool ever sent.      COVID-19 (Rule Out) 06/10/20 06/10/20 06/10/20 COVID-19 (Ordered)   06/10/20 Rule-Out Test Resulted    C-diff Rule Out 06/09/20 06/09/20 06/09/20 GI Bacterial Pathogens By PCR (Ordered)   06/10/20 Rule-Out Test Resulted    C-diff Rule Out 06/09/20 06/09/20 06/09/20 Clostridium difficile toxin/antigen (Ordered)   06/09/20 Rule-Out Test Resulted    C-diff Rule Out 05/03/20 05/04/20 05/04/20 C. difficile toxin Molecular (Ordered)   05/05/20 Rule-Out Test Resulted    MDRO (multi-drug resistant organism) 05/02/20 05/04/20 05/02/20 Culture, Urine   04/22/21 Franny Correa RN    Has had new urine culture on 4/17/21 with mixed anne; no MDRO noted    COVID-19 (Rule Out) 05/03/20 05/03/20 05/03/20 COVID-19 (Ordered)   05/03/20 Rule-Out Test Resulted    C-diff Rule Out 05/02/20 05/02/20 05/03/20 Clostridium difficile toxin/antigen (Ordered)   05/03/20 Rule-Out Test Resulted    MRSA 02/07/20 02/08/20 02/07/20 MRSA DNA Probe, Nasal   05/04/20 Lacho Gonzáles RN    MDRO (multi-drug resistant organism) 10/26/19 10/28/19 11/19/19 Urine culture   01/16/20 Lacho Gonzáles RN            Nurse Assessment:  Last Vital Signs: /68   Pulse 91   Temp 97.6 °F (36.4 °C) (Oral)   Resp 18   Ht 5' 11\" (1.803 m)   Wt 230 lb 6.1 oz (104.5 kg)   SpO2 95%   BMI 32.13 kg/m²     Last documented pain score (0-10 scale): Pain Level: 0  Last Weight:   Wt Readings from Last 1 Encounters:   01/18/22 230 lb 6.1 oz (104.5 kg)     Mental Status:  {IP PT MENTAL STATUS:36296}    IV Access:  { AZRA IV ACCESS:651255657}    Nursing Mobility/ADLs:  Walking   {[facility] DME XCHI:528369041}  Transfer  {P DME LIPI:401671840}  Bathing  {P DME BBPK:185122352}  Dressing  {P DME LCAZ:367645748}  Toileting  {[facility] DME GFGJ:827199929}  Feeding  {[facility] DME KGMM:816373305}  Med Admin  {[facility] DME HVLZ:909775395}  Med Delivery   { AZRA MED Delivery:199932445}    Wound Care Documentation and Therapy:  Negative Pressure Wound Therapy Leg Anterior; Lower;Right (Active)   Number of days: 77       Wound 11/01/21 Buttocks Right;Left redness, open areas, stage 2 ulcer (Active)   Number of days: 78        Elimination:  Continence:    Bowel: {YES / AD:08945}  Bladder: {YES / DH:34896}  Urinary Catheter: {Urinary Catheter:459854252}   Colostomy/Ileostomy/Ileal Conduit: {YES / PH:12279}       Date of Last BM: ***    Intake/Output Summary (Last 24 hours) at 1/18/2022 1143  Last data filed at 1/18/2022 1104  Gross per 24 hour Intake 170 ml   Output 900 ml   Net -730 ml     No intake/output data recorded. Safety Concerns:     [address] Safety Concerns:916918960}    Impairments/Disabilities:      [address] Impairments/Disabilities:620510632}    Nutrition Therapy:  Current Nutrition Therapy:   [address] Diet List:466223168}    Routes of Feeding: {CHP DME Other Feedings:411803920}  Liquids: {Slp liquid thickness:43222}  Daily Fluid Restriction: {CHP DME Yes amt example:438224457}  Last Modified Barium Swallow with Video (Video Swallowing Test): {Done Not Done JPA}    Treatments at the Time of Hospital Discharge:   Respiratory Treatments: ***  Oxygen Therapy:  {Therapy; copd oxygen:13347}  Ventilator:    {MH CC Vent EQQJ:915466514}    Rehab Therapies: {THERAPEUTIC INTERVENTION:8764578067}  Weight Bearing Status/Restrictions: [address] [place]  Weight Bearin}  Other Medical Equipment (for information only, NOT a DME order):  {EQUIPMENT:955884117}  Other Treatments: ***    Patient's personal belongings (please select all that are sent with patient):  {CHP DME Belongings:173012184}    RN SIGNATURE:  {Esignature:509332861}    CASE MANAGEMENT/SOCIAL WORK SECTION    Inpatient Status Date: 22    Readmission Risk Assessment Score:  Readmission Risk              Risk of Unplanned Readmission:  26           Discharging to Facility/ Agency   [facility]: [place] [address]     Phone: [phone]     Fax: [phone]          Dialysis Facility (if applicable)   N/a    / signature: Electronically signed by ARIC Samuels LSW on 22 at 4:14 PM EST    PHYSICIAN SECTION    Prognosis: {Prognosis:2704595947}    Condition at Discharge: [address] Patient Condition:737202394}    Rehab Potential (if transferring to Rehab): {Prognosis:4890736384}    Recommended Labs or Other Treatments After Discharge: ***  PODIATRY   Wound care as follows:  Both Left and Right lower extremity. Every other day Monday, Wednesday, and Friday  Left Lower Extremity  -Apply gauze from the toes to below the knee  -Apply Kerlix  from the toes to below the knee  -Apply Ace Bandage 4 inch from toes to ankle. -Apply Ace Bandage 6 inch from ankle to below the knee. Right Lower Extremity  -Apply hydrogel to wounds on the posterior calf  -Apply adaptic gauze over the wounds and on the leg  -Apply gauze from the toes to below the knee  -Apply Kerlix  from the toes to below the knee  -Apply Ace Bandage 4 inch from toes to ankle. -Apply Ace Bandage 6 inch from ankle to below the knee. -If the wound is healed to the right leg can discontinue dressing changes and please apply JUANJO hose compression stockings to both legs. Physician Certification: I certify the above information and transfer of Reggie Swenson  is necessary for the continuing treatment of the diagnosis listed and that he requires {Admit to Appropriate Level of Care:90952} for {GREATER/LESS:798510985} 30 days.      Update Admission H&P: {CHP DME Changes in CRLPY:785141236}    PHYSICIAN SIGNATURE:  {Esignature:212056917}

## 2022-01-19 ENCOUNTER — APPOINTMENT (OUTPATIENT)
Dept: VASCULAR LAB | Age: 82
DRG: 309 | End: 2022-01-19
Payer: MEDICARE

## 2022-01-19 LAB
A/G RATIO: 0.8 (ref 1.1–2.2)
ALBUMIN SERPL-MCNC: 3 G/DL (ref 3.4–5)
ALP BLD-CCNC: 119 U/L (ref 40–129)
ALT SERPL-CCNC: 12 U/L (ref 10–40)
ANION GAP SERPL CALCULATED.3IONS-SCNC: 7 MMOL/L (ref 3–16)
AST SERPL-CCNC: 16 U/L (ref 15–37)
BASOPHILS ABSOLUTE: 0.1 K/UL (ref 0–0.2)
BASOPHILS RELATIVE PERCENT: 0.8 %
BILIRUB SERPL-MCNC: 0.3 MG/DL (ref 0–1)
BUN BLDV-MCNC: 30 MG/DL (ref 7–20)
CALCIUM SERPL-MCNC: 8.2 MG/DL (ref 8.3–10.6)
CHLORIDE BLD-SCNC: 108 MMOL/L (ref 99–110)
CO2: 26 MMOL/L (ref 21–32)
CREAT SERPL-MCNC: 1.1 MG/DL (ref 0.8–1.3)
EOSINOPHILS ABSOLUTE: 0.3 K/UL (ref 0–0.6)
EOSINOPHILS RELATIVE PERCENT: 3.9 %
GFR AFRICAN AMERICAN: >60
GFR NON-AFRICAN AMERICAN: >60
GLUCOSE BLD-MCNC: 81 MG/DL (ref 70–99)
HCT VFR BLD CALC: 25 % (ref 40.5–52.5)
HEMOGLOBIN: 8.1 G/DL (ref 13.5–17.5)
LYMPHOCYTES ABSOLUTE: 1.5 K/UL (ref 1–5.1)
LYMPHOCYTES RELATIVE PERCENT: 22 %
MCH RBC QN AUTO: 28.3 PG (ref 26–34)
MCHC RBC AUTO-ENTMCNC: 32.2 G/DL (ref 31–36)
MCV RBC AUTO: 88 FL (ref 80–100)
MONOCYTES ABSOLUTE: 0.6 K/UL (ref 0–1.3)
MONOCYTES RELATIVE PERCENT: 9.3 %
NEUTROPHILS ABSOLUTE: 4.3 K/UL (ref 1.7–7.7)
NEUTROPHILS RELATIVE PERCENT: 64 %
PDW BLD-RTO: 17.1 % (ref 12.4–15.4)
PLATELET # BLD: 138 K/UL (ref 135–450)
PMV BLD AUTO: 8.9 FL (ref 5–10.5)
POTASSIUM REFLEX MAGNESIUM: 4.9 MMOL/L (ref 3.5–5.1)
RBC # BLD: 2.85 M/UL (ref 4.2–5.9)
SODIUM BLD-SCNC: 141 MMOL/L (ref 136–145)
TOTAL PROTEIN: 6.9 G/DL (ref 6.4–8.2)
WBC # BLD: 6.7 K/UL (ref 4–11)

## 2022-01-19 PROCEDURE — 99232 SBSQ HOSP IP/OBS MODERATE 35: CPT | Performed by: INTERNAL MEDICINE

## 2022-01-19 PROCEDURE — 80053 COMPREHEN METABOLIC PANEL: CPT

## 2022-01-19 PROCEDURE — 6370000000 HC RX 637 (ALT 250 FOR IP): Performed by: INTERNAL MEDICINE

## 2022-01-19 PROCEDURE — 99233 SBSQ HOSP IP/OBS HIGH 50: CPT | Performed by: NURSE PRACTITIONER

## 2022-01-19 PROCEDURE — 36415 COLL VENOUS BLD VENIPUNCTURE: CPT

## 2022-01-19 PROCEDURE — 2580000003 HC RX 258: Performed by: INTERNAL MEDICINE

## 2022-01-19 PROCEDURE — 92526 ORAL FUNCTION THERAPY: CPT

## 2022-01-19 PROCEDURE — 2060000000 HC ICU INTERMEDIATE R&B

## 2022-01-19 PROCEDURE — 85025 COMPLETE CBC W/AUTO DIFF WBC: CPT

## 2022-01-19 PROCEDURE — 93926 LOWER EXTREMITY STUDY: CPT

## 2022-01-19 PROCEDURE — 6360000002 HC RX W HCPCS: Performed by: INTERNAL MEDICINE

## 2022-01-19 RX ORDER — ALBUTEROL SULFATE 2.5 MG/3ML
2.5 SOLUTION RESPIRATORY (INHALATION) 2 TIMES DAILY
Status: DISCONTINUED | OUTPATIENT
Start: 2022-01-20 | End: 2022-01-19

## 2022-01-19 RX ORDER — ALBUTEROL SULFATE 2.5 MG/3ML
2.5 SOLUTION RESPIRATORY (INHALATION) EVERY 4 HOURS PRN
Status: DISCONTINUED | OUTPATIENT
Start: 2022-01-19 | End: 2022-01-21 | Stop reason: HOSPADM

## 2022-01-19 RX ADMIN — SODIUM CHLORIDE, PRESERVATIVE FREE 10 ML: 5 INJECTION INTRAVENOUS at 08:08

## 2022-01-19 RX ADMIN — FUROSEMIDE 40 MG: 40 TABLET ORAL at 16:50

## 2022-01-19 RX ADMIN — PANTOPRAZOLE SODIUM 40 MG: 40 TABLET, DELAYED RELEASE ORAL at 06:27

## 2022-01-19 RX ADMIN — ACETAMINOPHEN 650 MG: 325 TABLET ORAL at 08:14

## 2022-01-19 RX ADMIN — GUAIFENESIN 600 MG: 600 TABLET, EXTENDED RELEASE ORAL at 20:41

## 2022-01-19 RX ADMIN — SODIUM CHLORIDE, PRESERVATIVE FREE 10 ML: 5 INJECTION INTRAVENOUS at 20:41

## 2022-01-19 RX ADMIN — ATORVASTATIN CALCIUM 80 MG: 40 TABLET, FILM COATED ORAL at 20:41

## 2022-01-19 RX ADMIN — Medication 10 MG: at 23:02

## 2022-01-19 RX ADMIN — Medication: at 22:42

## 2022-01-19 RX ADMIN — IRON SUCROSE 200 MG: 20 INJECTION, SOLUTION INTRAVENOUS at 09:08

## 2022-01-19 RX ADMIN — TAMSULOSIN HYDROCHLORIDE 0.8 MG: 0.4 CAPSULE ORAL at 08:05

## 2022-01-19 RX ADMIN — GABAPENTIN 600 MG: 600 TABLET, FILM COATED ORAL at 20:41

## 2022-01-19 RX ADMIN — ASPIRIN 81 MG: 81 TABLET, CHEWABLE ORAL at 08:05

## 2022-01-19 RX ADMIN — GUAIFENESIN 600 MG: 600 TABLET, EXTENDED RELEASE ORAL at 08:05

## 2022-01-19 RX ADMIN — GABAPENTIN 600 MG: 600 TABLET, FILM COATED ORAL at 08:05

## 2022-01-19 ASSESSMENT — PAIN DESCRIPTION - LOCATION
LOCATION: FOOT

## 2022-01-19 ASSESSMENT — PAIN DESCRIPTION - FREQUENCY
FREQUENCY: CONTINUOUS
FREQUENCY: CONTINUOUS

## 2022-01-19 ASSESSMENT — PAIN SCALES - GENERAL
PAINLEVEL_OUTOF10: 0
PAINLEVEL_OUTOF10: 3
PAINLEVEL_OUTOF10: 8
PAINLEVEL_OUTOF10: 8
PAINLEVEL_OUTOF10: 0
PAINLEVEL_OUTOF10: 3
PAINLEVEL_OUTOF10: 0
PAINLEVEL_OUTOF10: 0

## 2022-01-19 ASSESSMENT — PAIN DESCRIPTION - ORIENTATION
ORIENTATION: RIGHT;LEFT

## 2022-01-19 ASSESSMENT — PAIN DESCRIPTION - DESCRIPTORS
DESCRIPTORS: ACHING

## 2022-01-19 ASSESSMENT — PAIN DESCRIPTION - PROGRESSION
CLINICAL_PROGRESSION: NOT CHANGED
CLINICAL_PROGRESSION: NOT CHANGED

## 2022-01-19 ASSESSMENT — PAIN DESCRIPTION - ONSET
ONSET: ON-GOING
ONSET: ON-GOING

## 2022-01-19 ASSESSMENT — PAIN DESCRIPTION - PAIN TYPE
TYPE: CHRONIC PAIN
TYPE: CHRONIC PAIN

## 2022-01-19 ASSESSMENT — PAIN - FUNCTIONAL ASSESSMENT: PAIN_FUNCTIONAL_ASSESSMENT: ACTIVITIES ARE NOT PREVENTED

## 2022-01-19 NOTE — PROGRESS NOTES
Tinea corporis 12/16/2013    Carotid stenosis 12/16/2013    Hypotension 11/30/2013    Light headed 11/30/2013     Allergies: Allergies   Allergen Reactions    Bactrim [Sulfamethoxazole-Trimethoprim] Rash     Onset Date: 01/17/2022      CXR (01/17/2022)-  Impression       Mild chronic bilateral interstitial opacities and small chronic left pleural effusion, similar in appearance compared to prior. Previous MBS (n/a)    Chart reviewed. Medical Diagnosis: Sick sinus syndrome ([facility] [address] [I49.5]  Weakness [R53.1]  Symptomatic bradycardia [R00.1]  Hypothermia, initial encounter [T68. XXXA]   Treatment Diagnosis: dysphagia    BSE Impression (01/18/2021)-  Dysphagia Impression : Moderate oral stage dysphagia and mild pharyngeal stage dysphagia. Pt demonstrates compromised mastication due to many missing teeth. Pt reports that he has a \"plate\" but it doesn't fit. Pt is agreeable to recommendation for Dysphagia Soft and Bite-Sized diet texture. Pt demonstrates inconsistent clinical s/s aspiration (occurs with initial straw sip, but does not occur when taking 3 oz via straw) with thin liquids. No clinical s/s aspiration observed with puree or solids. ST will follow. Recommend Dysphagia Soft and Bite-Sized texture and Thin Liquids at this time. Further assessment indicated to determine whether pt will benefit from instrumental swallow assessment. Dysphagia Diagnosis: Moderate oral stage dysphagia,Mild pharyngeal stage dysphagia,Suspected needs further assessment    MBS results (date)-    Pain: feet    Current Diet : ADULT DIET; Dysphagia - Soft and Bite Sized; 5 carb choices (75 gm/meal);  Low Fat/Low Chol/High Fiber/2 gm Na  ADULT ORAL NUTRITION SUPPLEMENT; Lunch, Dinner; Standard High Calorie/High Protein Oral Supplement   Recommended Form of Meds: PO  Compensatory Swallowing Strategies: Upright as possible for all oral intake,Eat/Feed slowly,Small bites/sips     Treatment:  Pt seen bedside to address the

## 2022-01-19 NOTE — PROGRESS NOTES
ID Follow-up NOTE  Medical Student note - reviewed and modified, see Attending addendum at bottom    CC:   Bacteriuria vs UTI  Antibiotics: None    Admit Date: 1/17/2022  Hospital Day: 3    Subjective:     Patient had no acute events overnight. Leg edema has improved with use of compression stockings. Pt denies fever,chills, chest pain, sob, nausea, and vomiting      Objective:     Patient Vitals for the past 8 hrs:   BP Temp Temp src Pulse Resp SpO2   01/19/22 1056 (!) 118/47 98.1 °F (36.7 °C) Oral 62 14 94 %   01/19/22 0759 (!) 107/54 97.6 °F (36.4 °C) Oral 63 11 94 %     I/O last 3 completed shifts: In: 610 [P.O.:610]  Out: 3050 [Urine:3050]  No intake/output data recorded. EXAM:  GENERAL: No apparent distress.     HEENT: Membranes moist, no oral lesion  NECK:  Supple, no lymphadenopathy  LUNGS: Clear b/l, no rales, no dullness  CARDIAC: RRR, no murmur appreciated  ABD:  + BS, soft / NT  EXT:  No rash, 1+ edema Le bilaterally, venous stasis leg wound on right  NEURO: No focal neurologic findings  PSYCH: Orientation, sensorium, mood normal  LINES:  Peripheral iv       Data Review:  Lab Results   Component Value Date    WBC 6.7 01/19/2022    HGB 8.1 (L) 01/19/2022    HCT 25.0 (L) 01/19/2022    MCV 88.0 01/19/2022     01/19/2022     Lab Results   Component Value Date    CREATININE 1.1 01/19/2022    BUN 30 (H) 01/19/2022     01/19/2022    K 4.9 01/19/2022     01/19/2022    CO2 26 01/19/2022       Hepatic Function Panel:   Lab Results   Component Value Date    ALKPHOS 119 01/19/2022    ALT 12 01/19/2022    AST 16 01/19/2022    PROT 6.9 01/19/2022    BILITOT 0.3 01/19/2022    BILIDIR <0.2 06/20/2021    IBILI see below 06/20/2021    LABALBU 3.0 01/19/2022       MICRO:  1/18 blood cultures-no growth to date    1/17  Component Ref Range & Units 1/17/22 1725   Color, UA Straw/Yellow Yellow    Clarity, UA Clear SL CLOUDY Abnormal     Glucose, Ur Negative mg/dL Negative    Bilirubin Urine Negative Negative    Ketones, Urine Negative mg/dL Negative    Specific Gravity, UA 1.005 - 1.030 1.025    Blood, Urine Negative TRACE-INTACT Abnormal     pH, UA 5.0 - 8.0 6.5    Protein, UA Negative mg/dL 100 Abnormal     Urobilinogen, Urine <2.0 E.U./dL 0.2    Nitrite, Urine Negative Negative    Leukocyte Esterase, Urine Negative MODERATE Abnormal        1/17  Component Ref Range & Units 1/17/22 1725   WBC, UA 0 - 5 /HPF 10-20 Abnormal     RBC, UA 0 - 4 /HPF 0-2    Bacteria, UA None Seen /HPF Rare Abnormal     Yeast, UA None Seen /HPF Present Abnormal         IMAGING:  No pertinent imaging        Scheduled Meds:   guaiFENesin  600 mg Oral BID    sodium chloride flush  10 mL IntraVENous 2 times per day    aspirin  81 mg Oral Daily    atorvastatin  80 mg Oral Nightly    gabapentin  600 mg Oral BID    pantoprazole  40 mg Oral QAM AC    tamsulosin  0.8 mg Oral Daily    furosemide  40 mg Oral BID       Continuous Infusions:   sodium chloride         PRN Meds:  albuterol, sodium chloride flush, sodium chloride, potassium chloride, magnesium sulfate, promethazine **OR** ondansetron, acetaminophen **OR** acetaminophen, Venelex, dicyclomine, docusate sodium, melatonin      Assessment:     Multiple medical problems  Bradycardia/Hypothermia resolved    Bacteriuria vs UTI, favor bacteriuria  Schneider replaced on 1/18    Plan:     Continue off antibiotics    Discussed with Dr. Audrey Simmons [facility]    [de-identified] yo man with hx CAD, HTN, BPH, HL, nephrolithiasis  Hx UTIs, had chronic schneider, prior cult with Proteus penneri   Hx sacral wound, R LE wound (anterior tibia)     Hosp at [facility] 10/31 - 11/4   D/c from [place] in late Dec     Referred to [facility] with fatigue, poss UTI by home Health. In ED 1/17 T 99, WBC 4.7, UA mod LE, micro 10-20 WBC  Bradycardic (lowest 33), Hypothermic (lowest 90.7)     Admit, consulted Card     1/18 - afeb (last temp 97.6), normal HR (last 91)  Awake, alert.   No  complaints     Today 1/19 - afeb, normal temp / HR / BP    IMP/  Mult med problems  Bradycardia / hypothermia resolved    - Card involved     Bacteriuria vs UTI, favor bacteriuria   - no urine cult sent     REC/  Cont off antibiotics   Iglesias catheter changed     Medical Decision Making:   The following items were considered in medical decision making:  Discussion of patient care with other providers  Reviewed clinical lab tests  Reviewed radiology tests  Reviewed other diagnostic tests/interventions  Microbiology cultures and other micro tests reviewed       Discussed with pt, daughter  Will sign off, call with ID issues   Areli Tran MD

## 2022-01-19 NOTE — PLAN OF CARE
Problem: Skin Integrity:  Goal: Will show no infection signs and symptoms  Description: Will show no infection signs and symptoms  Outcome: Ongoing  Note: Pt with multiple wounds and breakdown on buttocks. Q2 turn while patient on unit today. Venelex cream applied to buttocks area.  Skin kept clean and dry

## 2022-01-19 NOTE — PROGRESS NOTES
Hospitalist Progress Note      PCP: INES BLANDON [address]    Date of Admission: 2022    Chief Complaint on Admission: weakness    Pt Seen/Examined and Chart Reviewed. Admitting dx bradycardia, hypothermia    SUBJECTIVE/OBJECTIVE:   Patient admitted from home with weakness, he was found to be bradycardic to 30's and hypothermic to 32.6C, placed on bear hugger. No acute events overnight. Leg edema improved with compression and lasix. HR and temp stable. Eating well. Family is not at bedside, called and left voicemail for dtr Jory. Allergies  Bactrim [sulfamethoxazole-trimethoprim]    Medications      Scheduled Meds:   iron sucrose  200 mg IntraVENous Once    guaiFENesin  600 mg Oral BID    sodium chloride flush  10 mL IntraVENous 2 times per day    aspirin  81 mg Oral Daily    atorvastatin  80 mg Oral Nightly    gabapentin  600 mg Oral BID    pantoprazole  40 mg Oral QAM AC    tamsulosin  0.8 mg Oral Daily    furosemide  40 mg Oral BID       Infusions:   sodium chloride         PRN Meds:  albuterol, sodium chloride flush, sodium chloride, potassium chloride, magnesium sulfate, promethazine **OR** ondansetron, acetaminophen **OR** acetaminophen, Venelex, dicyclomine, docusate sodium, melatonin    Vitals    TEMPERATURE:  Current - Temp: 97.6 °F (36.4 °C);  Max - Temp  Av.9 °F (36.6 °C)  Min: 97.6 °F (36.4 °C)  Max: 98.3 °F (36.8 °C)  RESPIRATIONS RANGE: Resp  Av.4  Min: 11  Max: 20  PULSE RANGE: Pulse  Av  Min: 63  Max: 100  BLOOD PRESSURE RANGE:  Systolic (27PXD), TQU:776 , Min:106 , CAT:737   ; Diastolic (89MIC), KNK:29, Min:47, Max:84    PULSE OXIMETRY RANGE: SpO2  Av.7 %  Min: 94 %  Max: 95 %  24HR INTAKE/OUTPUT:      Intake/Output Summary (Last 24 hours) at 2022 8054  Last data filed at 2022 0420  Gross per 24 hour   Intake 440 ml   Output 2600 ml   Net -2160 ml       Exam:      General appearance: No apparent distress, appears stated age and cooperative. Lungs: resolved b/l rhonchi  Heart: Regular rate and rhythm with Normal S1/S2 without  murmurs, rubs or gallops, point of maximum impulse non-displaced  Abdomen: Soft, non-tender or non-distended without rigidity or guarding and positive bowel sounds all four quadrants. Extremities: 1+ pitting leg edema, improved  Skin: venous stasis, leg wounds  Neurologic: Alert and oriented X 3,  Chronic lower leg paralysis  Mental status: Alert, oriented, thought content appropriate. Data    Recent Labs     01/18/22  0202 01/18/22  0618 01/19/22  0634   WBC 5.0 4.9 6.7   HGB 8.9* 8.8* 8.1*   HCT 27.7* 28.0* 25.0*    154 138      Recent Labs     01/17/22  1710 01/18/22  0618 01/19/22  0633    137 141   K 4.7 4.6 4.9    105 108   CO2 25 26 26   BUN 32* 29* 30*   CREATININE 1.1 1.0 1.1     Recent Labs     01/18/22  0618 01/19/22  0633   AST 20 16   ALT 13 12   BILITOT <0.2 0.3   ALKPHOS 123 119     No results for input(s): INR in the last 72 hours. Recent Labs     01/17/22  1710 01/17/22 2002 01/18/22  0618   CKTOTAL  --   --  114   TROPONINI 0.03* 0.03*  --        Consults:     IP CONSULT TO HOSPITALIST  IP CONSULT TO CARDIOLOGY  IP CONSULT TO SOCIAL WORK  IP CONSULT TO DIETITIAN  IP CONSULT TO INFECTIOUS DISEASES  IP CONSULT TO PODIATRY  IP CONSULT TO [facility] [address] Problems    Diagnosis Date Noted    Symptomatic bradycardia [R00.1]     Weakness [R53.1] 01/17/2022    Bradycardia [R00.1]     Bacteriuria [R82.71] 11/20/2019    Hypothermia [T68. XXXA] 01/03/2019         ASSESSMENT AND PLAN      Bradycardia with hypothermia:  Not on remington agents at baseline  TSH 5.19, free T4 0.9- should not be contributing but needs to have regular follow up  Cardiology following- no pacemaker needed  Monitor on tele  No evidence of sepsis so far and stable off antibiotics  ID following    Oral/pharyngeal dysphagia:  On soft and bite size diet  Mucinex, acapella, albuterol nebs  CXR- negative for acute infiltrates  resp status is stable today    Leg edema with wounds:  Likely from venous stasis  On lasix to help swelling  Will stop norvasc on dc  Venous and arterial doppler studies pending    Iron def anemia:  Placed on venofer    PT/OT SW consult- will need placement    DVT Prophylaxis: SCD  Diet: ADULT DIET; Dysphagia - Soft and Bite Sized; 5 carb choices (75 gm/meal);  Low Fat/Low Chol/High Fiber/2 gm Na  ADULT ORAL NUTRITION SUPPLEMENT; Lunch, Dinner; Standard High Calorie/High Protein Oral Supplement  Code Status: Full Code    PT/OT Eval Status:SNF status    Dispo - inpt pending vascular studies    Kg Onofre MD

## 2022-01-19 NOTE — RT PROTOCOL NOTE
RT Nebulizer Bronchodilator Protocol Note    There is a bronchodilator order in the chart from a provider indicating to follow the RT Bronchodilator Protocol and there is an Initiate RT Bronchodilator Protocol order as well (see protocol at bottom of note). CXR Findings:  XR CHEST PORTABLE    Result Date: 1/17/2022  Mild chronic bilateral interstitial opacities and small chronic left pleural effusion, similar in appearance compared to prior. The findings from the last RT Protocol Assessment were as follows:  Smoking: Smoker 15 pack years or more  Respiratory Pattern: Regular pattern and RR 12-20 bpm  Breath Sounds: Slightly diminished and/or crackles  Cough: Strong, spontaneous, non-productive  Indication for Bronchodilator Therapy: Decreased or absent breath sounds  Bronchodilator Assessment Score: 3    Aerosolized bronchodilator medication orders have been revised according to the RT Nebulizer Bronchodilator Protocol below. Respiratory Therapist to perform RT Therapy Protocol Assessment initially then follow the protocol. Repeat RT Therapy Protocol Assessment PRN for score 0-3 or on second treatment, BID, and PRN for scores above 3. No Indications - adjust the frequency to every 6 hours PRN wheezing or bronchospasm, if no treatments needed after 48 hours then discontinue using Per Protocol order mode. If indication present, adjust the RT bronchodilator orders based on the Bronchodilator Assessment Score as indicated below. If a patient is on this medication at home then do not decrease Frequency below that used at home. 0-3 - enter or revise RT bronchodilator order(s) to equivalent RT Bronchodilator order with Frequency of every 4 hours PRN for wheezing or increased work of breathing using Per Protocol order mode.        4-6 - enter or revise RT Bronchodilator order(s) to two equivalent RT bronchodilator orders with one order with BID Frequency and one order with Frequency of every 4 hours PRN wheezing or increased work of breathing using Per Protocol order mode. 7-10 - enter or revise RT Bronchodilator order(s) to two equivalent RT bronchodilator orders with one order with TID Frequency and one order with Frequency of every 4 hours PRN wheezing or increased work of breathing using Per Protocol order mode. 11-13 - enter or revise RT Bronchodilator order(s) to one equivalent RT bronchodilator order with QID Frequency and an Albuterol order with Frequency of every 4 hours PRN wheezing or increased work of breathing using Per Protocol order mode. Greater than 13 - enter or revise RT Bronchodilator order(s) to one equivalent RT bronchodilator order with every 4 hours Frequency and an Albuterol order with Frequency of every 2 hours PRN wheezing or increased work of breathing using Per Protocol order mode. RT to enter RT Home Evaluation for COPD & MDI Assessment order using Per Protocol order mode.     Electronically signed by Long Langford RCP on 1/18/2022 at 9:36 PM

## 2022-01-19 NOTE — CONSULTS
Vascular Surgery  Resident Consult Note    Reason for consult: PAD    Chief Complaint: weakness    History obtained from: patient/EMR    History of Present Illness :    Gigi Decker is a [de-identified] y.o. male with history of A-fib, hypercholesterolemia, HTN, renal insufficiency, and s/p CABG (2013) who presented to [facility] on 1/17 with weakness and for whom vascular surgery is consulted for abnormal ABIs. Patient lives at home alone and has been experience some increasing fatigue and weakness over the last few days. Patient does not walk at baseline and uses a wheelchair to get around. Patient states he has chronic neuropathy. Patient has additionally had chronic swelling to bilateral lower extremities. Patient sees Dr. Cash Garber for wound care regarding lower extremity wound and receives home care with dressing changes every other day. Past Medical History:        Diagnosis Date    BPH (benign prostatic hyperplasia)     Carotid stenosis 12/2013    JESU 24-66% stenosis; LICA 51-45% stenosis    Cellulitis 12/2013    LLE    [facility]estive [facility] failure[facility] ([facility])     Chronic back pain     Diverticular disease     GERD (gastroesophageal reflux disease)     History of atrial fibrillation     Hypercholesteremia     Hypertension     Lower GI bleed     MDRO (multiple drug resistant organisms) resistance 10/26/2019    urine    Neuromuscular disorder (HCC)     spasticity    Renal insufficiency     Vitamin B12 deficiency        Past Surgical History:           Procedure Laterality Date    BACK SURGERY  2006    lower lumbar    CORONARY ARTERY BYPASS GRAFT  12/13/2013    CABG x 5 (Dr Varghese [facility]), svg to diag, om1 and 3, distal rca, kelly to lad.      CYSTOSCOPY N/A 1/10/2019    CYSTOSCOPY performed by Rosette Oreilly MD at [facility] 1690 Right 5/1/2015    ORIF    LEG SURGERY Right 11/2/2021    RIGHT LOWER EXTREMITY ADVANCEMENT FLAP AND SPLIT THICKNESS SKIN GRAFT PLACEMENT; (WOUND- 10 CM X 5.5 mL, 2 times per day  sodium chloride flush 0.9 % injection 10 mL, PRN  0.9 % sodium chloride infusion, PRN  potassium chloride 10 mEq/100 mL IVPB (Peripheral Line), PRN  magnesium sulfate 2000 mg in 50 mL IVPB premix, PRN  promethazine (PHENERGAN) tablet 12.5 mg, Q6H PRN   Or  ondansetron (ZOFRAN) injection 4 mg, Q6H PRN  acetaminophen (TYLENOL) tablet 650 mg, Q6H PRN   Or  acetaminophen (TYLENOL) suppository 650 mg, Q6H PRN  aspirin chewable tablet 81 mg, Daily  atorvastatin (LIPITOR) tablet 80 mg, Nightly  Venelex ointment, Daily PRN  dicyclomine (BENTYL) tablet 20 mg, TID PRN  docusate sodium (COLACE) capsule 100 mg, Daily PRN  gabapentin (NEURONTIN) tablet 600 mg, BID  melatonin disintegrating tablet 10 mg, Nightly PRN  pantoprazole (PROTONIX) tablet 40 mg, QAM AC  tamsulosin (FLOMAX) capsule 0.8 mg, Daily  furosemide (LASIX) tablet 40 mg, BID        Family History:   Family History   Problem Relation Age of Onset    Heart Disease Father        Social History:   TOBACCO:   reports that he quit smoking about 52 years ago. He has never used smokeless tobacco.  ETOH:   reports no history of alcohol use. DRUGS:   reports no history of drug use.     Review of Systems:   A 14 point review of systems was conducted, significant findings as noted in HPI      Physical exam:    Vitals:    01/19/22 0413 01/19/22 0759 01/19/22 1056 01/19/22 1512   BP: (!) 107/49 (!) 107/54 (!) 118/47 (!) 106/51   Pulse: 69 63 62 57   Resp: 18 11 14 13   Temp: 97.9 °F (36.6 °C) 97.6 °F (36.4 °C) 98.1 °F (36.7 °C) 98 °F (36.7 °C)   TempSrc: Oral Oral Oral Oral   SpO2: 95% 94% 94% 95%   Weight: 216 lb 0.8 oz (98 kg)      Height:           General appearance: alert, resting in bed  Neuro: A&Ox3, no focal deficits  Chest/Lungs: normal respiratory effort, symmetric chest rise, on RA  Cardiovascular: RRR  Skin: warm and dry  Extremities: mild edema to bilateral LEs with wound to R shin with gauze dressing in place; bilateral DP and PT are Dopplerable    Labs:    CBC:   Recent Labs     01/18/22  0202 01/18/22  0618 01/19/22  0634   WBC 5.0 4.9 6.7   HGB 8.9* 8.8* 8.1*   HCT 27.7* 28.0* 25.0*   MCV 90.3 88.6 88.0    154 138     BMP:   Recent Labs     01/17/22  1710 01/18/22  0618 01/19/22  0633    137 141   K 4.7 4.6 4.9    105 108   CO2 25 26 26   BUN 32* 29* 30*   CREATININE 1.1 1.0 1.1     PT/INR: No results for input(s): PROTIME, INR in the last 72 hours. APTT: No results for input(s): APTT in the last 72 hours. Liver Profile:  Lab Results   Component Value Date    AST 16 01/19/2022    ALT 12 01/19/2022    BILIDIR <0.2 06/20/2021    BILITOT 0.3 01/19/2022    ALKPHOS 119 01/19/2022    GGT 23 05/01/2015     Lab Results   Component Value Date    CHOL 102 06/05/2021    HDL 43 06/05/2021    TRIG 54 06/05/2021     UA:   Lab Results   Component Value Date    COLORU Yellow 01/17/2022    PHUR 6.5 01/17/2022    LABCAST 0-1 Hyaline 12/16/2013    WBCUA 10-20 01/17/2022    RBCUA 0-2 01/17/2022    MUCUS 1+ 06/05/2021    YEAST Present 01/17/2022    BACTERIA Rare 01/17/2022    CLARITYU SL CLOUDY 01/17/2022    SPECGRAV 1.025 01/17/2022    LEUKOCYTESUR MODERATE 01/17/2022    UROBILINOGEN 0.2 01/17/2022    BILIRUBINUR Negative 01/17/2022    BLOODU TRACE-INTACT 01/17/2022    GLUCOSEU Negative 01/17/2022    AMORPHOUS 3+ 11/01/2021       Imaging:   VL DUP LOWER EXTREMITY ARTERIES RIGHT         XR TIBIA FIBULA RIGHT (2 VIEWS)   Final Result      No acute findings. XR CHEST PORTABLE   Final Result      Mild chronic bilateral interstitial opacities and small chronic left pleural effusion, similar in appearance compared to prior. VL Reflux Nick Insufficiency Stdy Bilat    (Results Pending)          Assessment/Plan:  Amira Stokes is a [de-identified] y.o. male with history of A-fib, hypercholesterolemia, HTN, renal insufficiency, and s/p CABG (2013) who presented to [facility] on 1/17 with weakness and for whom vascular surgery is consulted for abnormal ABIs. Patient is AF and HDS with normal WBCs. Arterial duplex of bilateral lower extremities shows a right ROSARIO of 1.5, greater than 50% stenosis at the mid popliteal artery, and abnormal monophasic Doppler waveforms.  L ROSARIO is 1.32. pulse exam of lower extremities shows dopplerable DP/PTs.     - no vascular surgery intervention is indicated at this time  - recommend f/u as an outpatient with Dr. Jack Marti  - medical management per primary team  - please call back if any questions  - patient seen with senior resident      Shahid Salomon DO  PGY-1, General Surgery  01/19/22  3:23 PM  [phone]

## 2022-01-19 NOTE — PROGRESS NOTES
Electrophysiology - PROGRESS NOTE    Admit Date: 1/17/2022     Chief Complaint: SB     Interval History:   Patient seen and examined and notes reviewed. Patient is being followed for bradycardia. Patient presented to the hospital after feeling fatigued, weak and suffering a fall at home. He was noted to be bradycardic with an HR 30s-40s. Of note he was also hypothermic with a temperature of 90.7 degrees. His bradycardia resolved as his temperature improved. He was in sinus rhythm overnight. Denies dizziness, lightheadedness, SOB, CP, palpitations, heart racing.     In: 610 [P.O.:610]  Out: 3050    Wt Readings from Last 2 Encounters:   01/19/22 216 lb 0.8 oz (98 kg)   12/28/21 185 lb (83.9 kg)         Data:   Scheduled Meds:   Scheduled Meds:   guaiFENesin  600 mg Oral BID    sodium chloride flush  10 mL IntraVENous 2 times per day    aspirin  81 mg Oral Daily    atorvastatin  80 mg Oral Nightly    gabapentin  600 mg Oral BID    pantoprazole  40 mg Oral QAM AC    tamsulosin  0.8 mg Oral Daily    furosemide  40 mg Oral BID     Continuous Infusions:   sodium chloride       PRN Meds:.albuterol, sodium chloride flush, sodium chloride, potassium chloride, magnesium sulfate, promethazine **OR** ondansetron, acetaminophen **OR** acetaminophen, Venelex, dicyclomine, docusate sodium, melatonin  Continuous Infusions:   sodium chloride         Intake/Output Summary (Last 24 hours) at 1/19/2022 1016  Last data filed at 1/19/2022 0420  Gross per 24 hour   Intake 440 ml   Output 2600 ml   Net -2160 ml       CBC:   Lab Results   Component Value Date    WBC 6.7 01/19/2022    HGB 8.1 01/19/2022     01/19/2022     BMP:  Lab Results   Component Value Date     01/19/2022    K 4.9 01/19/2022     01/19/2022    CO2 26 01/19/2022    BUN 30 01/19/2022    CREATININE 1.1 01/19/2022    GLUCOSE 81 01/19/2022     INR:   Lab Results   Component Value Date    INR 0.97 10/31/2021    INR 1.22 04/22/2021    INR 1.21 02/14/2021        CARDIAC LABS  ENZYMES:  Recent Labs     01/17/22  1710 01/17/22 2002   TROPONINI 0.03* 0.03*     FASTING LIPID PANEL:  Lab Results   Component Value Date    HDL 43 06/05/2021    LDLCALC 48 06/05/2021    TRIG 54 06/05/2021    TSH 3.05 06/05/2021     LIVER PROFILE:  Lab Results   Component Value Date    AST 16 01/19/2022    AST 20 01/18/2022    ALT 12 01/19/2022    ALT 13 01/18/2022       -----------------------------------------------------------------  Telemetry: Personally reviewed  SB/NSR    Objective:   Vitals: BP (!) 107/54   Pulse 63   Temp 97.6 °F (36.4 °C) (Oral)   Resp 11   Ht 5' 11\" (1.803 m)   Wt 216 lb 0.8 oz (98 kg)   SpO2 94%   BMI 30.13 kg/m²   General appearance: alert, appears stated age and cooperative, No acute distress   Eyes: Conjunctiva and pupils normal and reactive  Skin: Skin color, texture, turgor normal. No rashes or ecchymosis.   Neck: no JVD, supple, symmetrical, trachea midline   Lungs: , no accessory muscle use, no respiratory distress  Heart: RRR  Abdomen: soft, non-tender; bowel sounds normal  Extremities: No edema, DP +  Psychiatric: normal insight and affect    Patient Active Problem List:     Hypotension     Light headed     Cellulitis     Essential hypertension     GERD (gastroesophageal reflux disease)     Acute blood loss anemia     Tinea corporis     Carotid stenosis     CAD (coronary artery disease)     S/P CABG x 5     Lower GI bleeding     Hip fracture, right ([facility])     Acute right hip pain     Acute low back pain without sciatica     Hypothermia     Complicated UTI (urinary tract infection)     Edema     Problem with urinary catheter ([facility])     Acute encephalopathy     Chronic indwelling Iglesias catheter     Hyperlipidemia     Bilateral lower leg cellulitis     Neurogenic bladder     Bacteriuria     Abnormality of urethral meatus     Gross hematuria     CHF with unknown LVEF ([facility])     Dyspnea Bradycardia     Pseudomonas infection     Acute renal injury ([facility] [address]     Cellulitis of scrotum     Constipation     Encopresis with constipation and overflow incontinence     Abnormal stress test     H/O angiography     Abnormal angiogram     Acute cystitis with hematuria     Acute renal failure superimposed on stage 3 chronic kidney disease (HCC)     Urinary tract infection associated with indwelling urethral catheter (HCC)     Encephalopathy acute     Altered mental status     Hyperkalemia     Leg laceration, unspecified laterality, initial encounter     Pain of right lower extremity     Degloving injury     Weakness     Symptomatic bradycardia        Assessment & Plan:    1. Bradycardia  2. HTN  3. Hypothermia    Kimber Marcos is a [de-identified] y.o. man w/ PMH HTN, HLD, GERD, carotid stenosis, CAD, s/p CABG (2013), MPI (2020), follows with Dr. Nikkie Gray, who presented to the hospital with fatigue, weakness, fall, EKG significant for SB with HR 46, HR was noted to be in the 30s overnight with a single pause lasting 2.5 seconds, patient hypothermic    Bradycardia  - NSR overnight  - Hypothermia resolved  - Would avoid AVN agents at this time  - Keep K >4.0, Mg >2.0  - Keep on telemetry  - 1 week CAM at discharge, f/u EP outpt  - EP will sign off    HTN  - BP soft side at times    Electronically signed by TIM Hernandez CNP on 1/19/22 at 10:16 AM [facility]      I  have spent 35 minutes in care of the patient including direct face to face time, chart preparation, reviewing diagnostic testing, other provider notes and coordinating patient care.

## 2022-01-19 NOTE — PROGRESS NOTES
Podiatric Surgery Daily Progress Note      Admit Date: 1/17/2022                           Code:Full Code    Patient seen and examined, labs and records reviewed    Subjective:     Patient seen and examined at bedside this AM.  Patient denies any overnight acute event. Patient denies f/c/n/v/sob/cp. Review of Systems: A 12 point review of symptoms is unremarkable with the exception of the chief complaint. Patient specifically denies nausea, fever, vomiting, chills, shortness of breath, chest pain, abdominal pain, constipation or difficulty urinating. Objective     BP (!) 107/49   Pulse 69   Temp 97.9 °F (36.6 °C) (Oral)   Resp 18   Ht 5' 11\" (1.803 m)   Wt 216 lb 0.8 oz (98 kg)   SpO2 95%   BMI 30.13 kg/m²      I/O:    Intake/Output Summary (Last 24 hours) at 1/19/2022 0558  Last data filed at 1/19/2022 0420  Gross per 24 hour   Intake 610 ml   Output 3050 ml   Net -2440 ml              Wt Readings from Last 3 Encounters:   01/19/22 216 lb 0.8 oz (98 kg)   12/28/21 185 lb (83.9 kg)   11/10/21 184 lb 4.8 oz (83.6 kg)       LABS:    Recent Labs     01/18/22  0202 01/18/22  0618   WBC 5.0 4.9   HGB 8.9* 8.8*   HCT 27.7* 28.0*    154        Recent Labs     01/18/22  0618      K 4.6      CO2 26   BUN 29*   CREATININE 1.0        Recent Labs     01/18/22  0618   PROT 7.1           LOWER EXTREMITY EXAMINATION    Dressing to RIght LE intact. No strikethrough drainage noted to the external dressing. No strikethrough drainage noted to the internal dressing layer. VASCULAR:   - DP and PT pulses are non palpable b/l 2/2 nonpitting edema. - Upon hand-held Doppler biphasic signals noted to DP and PT pulses B/L LE.  - CFT brisk to the distal digits of the foot b/l.   - Skin temperature is warm to cool from proximal to distal with no focal calor noted. - Nonpitting edema noted with relaxed skin tension lines 2/2 Ace compressive therapy.   - No pain with calf compression b/l.     NEUROLOGIC:   - Gross and epicritic sensation is intact b/l.   - Protective sensation is appreciated at all pedal sites b/l.     DERMATOLOGIC:   Dermatological changes noted B/L leg     Right lower extremity:    -Partial thickness ulceration noted medial aspect of anterior shin.  -Wound measures approximately 3.2 cm x 2.7 cm x superficial.  -Wound base dermal and epithelialized tissue with slightly xerotic periwound.  -Wound does not probe to bone, tunnels, or tracks.  -No fluctuance, crepitus, malodor, or drainage noted.  -Wound clinically stable. Patient gave verbal consent for the picture taken at today's visit. Right lower extremity:  -Multiple dried serous crust anterior aspect of shin 2/2 scratching.  -Dried serous crust adhered and without any open wounds noted.  -No acute signs of infection noted.     MUSCULOSKELETAL:   - Muscle strength is 3/5 for all pedal groups tested. - No pain with palpation of the foot or ankle b/l. - Ankle joint ROM is decreased in dorsiflexion with the knee extended.          IMAGING:  Arterial duplex; 1/18/2022, pending    Venous reflux insufficiency test; 1/18/2022, pending      Right tib-fib x-ray; 1/18/2022   Narrative   4 views of the right tibia/fibula       HISTORY: Pain.       FINDINGS:       No acute fracture is seen. The alignment is maintained. The soft tissue structures are clear. There are vascular calcifications.           Impression       No acute findings.          ASSESSMENT/PLAN  -Chronic venous stasis partial-thickness ulceration, right lower extremity (POA) 2/2 fluid overload  -Chronic venous stasis dermatitis  -Lymphedema B/L LE  2/2 fluid overload  -Peripheral vascular disease      -Patient seen and examined at bedside this AM  -Slightly hypotensive, afebrile, no leukocytosis noted   -ESR and CRP; pending  -Blood cultures 1 & 2; NGTD  -X-ray tib-fib RLE images reviewed, impression noted above  -Ordered arterial duplex; follow-up results  -Ordered venous reflux insufficiency; follow-up results  -Wound culture not obtained at this time 2/2 no active drainage and would only colonized skin anne. -Infectious disease consulted; continue off antibiotics for now, change Iglesias cath  -Right lower extremity dressed with Adaptic, DSD, and Ace bandage  -Left lower extremity dressed with Ace bandage.  -Prevalon boots ordered. Patient is to wear at all times while in bed to prevent further deep tissue injury.  -Full weightbearing to B/L LE  -Lengthy discussion with patient regarding the importance of extremity elevation and edema control, patient voiced understanding        DISPO: Chronic venous stasis dermatitis with partial thickness ulceration to the right lower extremity. Wound appears clinically stable. Will follow-up on all labs and advanced imaging. We will continue to closely follow patient while in house for local wound care. Patient seen and examined at bedside with Dr. Irma Ponce. Yuriy Herrera DPM   Podiatric Resident, PGY-3  Pager: [phone] or Perfect serve   1/19/2022  5:58 AM     Patient was seen and evaluated at bedside. Agree with residents assessment and treatment plan.   Irma Ponce DPM

## 2022-01-19 NOTE — PROGRESS NOTES
Occupational Therapy/Physical Therapy-no tx    Attempted OT, transportation arrived to take pt to get LE dopplers. Will follow up per plan of care.   Nupur Garsia, OTR/L [id number]

## 2022-01-19 NOTE — PLAN OF CARE
Pt update given to pt's son, Estevan Lezama regarding current status and plan of care.  All questions answered

## 2022-01-19 NOTE — CARE COORDINATION
BAYRON spoke with April at [facility], who states patient was just at their facility recently in December and owes money form that stay. If he wants to return would need to take care of that balance and have a deposit down, due to he would be going into his co-pay days. Cristian Rajan following as well, as pt was recently working with her for placement. BAYRON spoke with Mc Brown  from [address]. He would like to contacted when patient discharged [phone].     Altha Apgar, RN, BSN,   4th Floor Progressive Care Unit  [phone]

## 2022-01-20 ENCOUNTER — APPOINTMENT (OUTPATIENT)
Dept: VASCULAR LAB | Age: 82
DRG: 309 | End: 2022-01-20
Payer: MEDICARE

## 2022-01-20 VITALS
WEIGHT: 207.23 LBS | HEART RATE: 66 BPM | DIASTOLIC BLOOD PRESSURE: 83 MMHG | RESPIRATION RATE: 14 BRPM | SYSTOLIC BLOOD PRESSURE: 123 MMHG | HEIGHT: 71 IN | BODY MASS INDEX: 29.01 KG/M2 | OXYGEN SATURATION: 97 % | TEMPERATURE: 97.4 F

## 2022-01-20 PROBLEM — R60.0 BILATERAL LEG EDEMA: Status: ACTIVE | Noted: 2022-01-20

## 2022-01-20 LAB
A/G RATIO: 0.7 (ref 1.1–2.2)
ALBUMIN SERPL-MCNC: 2.9 G/DL (ref 3.4–5)
ALP BLD-CCNC: 118 U/L (ref 40–129)
ALT SERPL-CCNC: 12 U/L (ref 10–40)
ANION GAP SERPL CALCULATED.3IONS-SCNC: 8 MMOL/L (ref 3–16)
AST SERPL-CCNC: 15 U/L (ref 15–37)
BASOPHILS ABSOLUTE: 0.1 K/UL (ref 0–0.2)
BASOPHILS RELATIVE PERCENT: 1 %
BILIRUB SERPL-MCNC: 0.4 MG/DL (ref 0–1)
BUN BLDV-MCNC: 36 MG/DL (ref 7–20)
CALCIUM SERPL-MCNC: 8.2 MG/DL (ref 8.3–10.6)
CHLORIDE BLD-SCNC: 104 MMOL/L (ref 99–110)
CO2: 25 MMOL/L (ref 21–32)
CREAT SERPL-MCNC: 1.1 MG/DL (ref 0.8–1.3)
EOSINOPHILS ABSOLUTE: 0.4 K/UL (ref 0–0.6)
EOSINOPHILS RELATIVE PERCENT: 6.8 %
GFR AFRICAN AMERICAN: >60
GFR NON-AFRICAN AMERICAN: >60
GLUCOSE BLD-MCNC: 77 MG/DL (ref 70–99)
HCT VFR BLD CALC: 24.4 % (ref 40.5–52.5)
HEMOGLOBIN: 7.9 G/DL (ref 13.5–17.5)
LYMPHOCYTES ABSOLUTE: 1.5 K/UL (ref 1–5.1)
LYMPHOCYTES RELATIVE PERCENT: 24.8 %
MCH RBC QN AUTO: 28.7 PG (ref 26–34)
MCHC RBC AUTO-ENTMCNC: 32.5 G/DL (ref 31–36)
MCV RBC AUTO: 88.1 FL (ref 80–100)
MONOCYTES ABSOLUTE: 0.5 K/UL (ref 0–1.3)
MONOCYTES RELATIVE PERCENT: 8.8 %
NEUTROPHILS ABSOLUTE: 3.5 K/UL (ref 1.7–7.7)
NEUTROPHILS RELATIVE PERCENT: 58.6 %
PDW BLD-RTO: 16.8 % (ref 12.4–15.4)
PLATELET # BLD: 121 K/UL (ref 135–450)
PMV BLD AUTO: 9.3 FL (ref 5–10.5)
POTASSIUM REFLEX MAGNESIUM: 4.8 MMOL/L (ref 3.5–5.1)
RBC # BLD: 2.77 M/UL (ref 4.2–5.9)
SODIUM BLD-SCNC: 137 MMOL/L (ref 136–145)
TOTAL PROTEIN: 6.8 G/DL (ref 6.4–8.2)
WBC # BLD: 6 K/UL (ref 4–11)

## 2022-01-20 PROCEDURE — 97530 THERAPEUTIC ACTIVITIES: CPT

## 2022-01-20 PROCEDURE — 93970 EXTREMITY STUDY: CPT

## 2022-01-20 PROCEDURE — 6370000000 HC RX 637 (ALT 250 FOR IP): Performed by: INTERNAL MEDICINE

## 2022-01-20 PROCEDURE — 2580000003 HC RX 258: Performed by: INTERNAL MEDICINE

## 2022-01-20 PROCEDURE — 36415 COLL VENOUS BLD VENIPUNCTURE: CPT

## 2022-01-20 PROCEDURE — 97535 SELF CARE MNGMENT TRAINING: CPT

## 2022-01-20 PROCEDURE — 85025 COMPLETE CBC W/AUTO DIFF WBC: CPT

## 2022-01-20 PROCEDURE — 80053 COMPREHEN METABOLIC PANEL: CPT

## 2022-01-20 PROCEDURE — 97112 NEUROMUSCULAR REEDUCATION: CPT

## 2022-01-20 RX ORDER — FERROUS SULFATE 325(65) MG
325 TABLET ORAL 2 TIMES DAILY
Qty: 180 TABLET | Refills: 1 | Status: SHIPPED | OUTPATIENT
Start: 2022-01-20 | End: 2022-08-30 | Stop reason: CLARIF

## 2022-01-20 RX ORDER — ALBUTEROL SULFATE 90 UG/1
2 AEROSOL, METERED RESPIRATORY (INHALATION) EVERY 6 HOURS PRN
Qty: 18 G | Refills: 3 | Status: SHIPPED | OUTPATIENT
Start: 2022-01-20

## 2022-01-20 RX ORDER — GUAIFENESIN 600 MG/1
600 TABLET, EXTENDED RELEASE ORAL 2 TIMES DAILY PRN
Qty: 60 TABLET | Refills: 2 | Status: SHIPPED | OUTPATIENT
Start: 2022-01-20

## 2022-01-20 RX ADMIN — GUAIFENESIN 600 MG: 600 TABLET, EXTENDED RELEASE ORAL at 09:05

## 2022-01-20 RX ADMIN — ASPIRIN 81 MG: 81 TABLET, CHEWABLE ORAL at 09:05

## 2022-01-20 RX ADMIN — SODIUM CHLORIDE, PRESERVATIVE FREE 10 ML: 5 INJECTION INTRAVENOUS at 09:05

## 2022-01-20 RX ADMIN — GUAIFENESIN 600 MG: 600 TABLET, EXTENDED RELEASE ORAL at 20:55

## 2022-01-20 RX ADMIN — SODIUM CHLORIDE, PRESERVATIVE FREE 10 ML: 5 INJECTION INTRAVENOUS at 20:56

## 2022-01-20 RX ADMIN — ATORVASTATIN CALCIUM 80 MG: 40 TABLET, FILM COATED ORAL at 20:55

## 2022-01-20 RX ADMIN — PANTOPRAZOLE SODIUM 40 MG: 40 TABLET, DELAYED RELEASE ORAL at 05:12

## 2022-01-20 RX ADMIN — GABAPENTIN 600 MG: 600 TABLET, FILM COATED ORAL at 09:05

## 2022-01-20 RX ADMIN — GABAPENTIN 600 MG: 600 TABLET, FILM COATED ORAL at 20:55

## 2022-01-20 RX ADMIN — FUROSEMIDE 40 MG: 40 TABLET ORAL at 09:05

## 2022-01-20 RX ADMIN — TAMSULOSIN HYDROCHLORIDE 0.8 MG: 0.4 CAPSULE ORAL at 09:05

## 2022-01-20 ASSESSMENT — PAIN DESCRIPTION - FREQUENCY: FREQUENCY: CONTINUOUS

## 2022-01-20 ASSESSMENT — PAIN SCALES - GENERAL
PAINLEVEL_OUTOF10: 0
PAINLEVEL_OUTOF10: 0
PAINLEVEL_OUTOF10: 8

## 2022-01-20 ASSESSMENT — PAIN DESCRIPTION - ORIENTATION: ORIENTATION: RIGHT;LEFT

## 2022-01-20 ASSESSMENT — PAIN DESCRIPTION - LOCATION: LOCATION: FOOT

## 2022-01-20 NOTE — PROGRESS NOTES
Physical Therapy  Facility/Department: [place] [address] [facility]  Daily Treatment Note  NAME: Lacy Ward  : 1940  MRN: [id number]    Date of Service: 2022    Discharge Recommendations:Tushar Block scored a  on the AM-PAC short mobility form. Current research shows that an AM-PAC score of 17 or less is typically not associated with a discharge to the patient's home setting. Based on the patient's AM-PAC score and their current functional mobility deficits, it is recommended that the patient have 3-5 sessions per week of Physical Therapy at d/c to increase the patient's independence. Please see assessment section for further patient specific details. If patient discharges prior to next session this note will serve as a discharge summary. Please see below for the latest assessment towards goals. PT Equipment Recommendations  Equipment Needed: No (defer)    Assessment   Body structures, Functions, Activity limitations: Increased pain;Decreased functional mobility ; Decreased balance;Decreased strength;Decreased safe awareness;Decreased endurance  Assessment: Pt with similar assist levels from eval. He has low motivation to perform any mobility tasks and requires a lot of encouragement. Pt with significant weakness and decreased activity tolerance. He fatigues easily with mobility. Pt typically lives alone though had not been home for long since discharge from hospital/SNF and had been unable to care for himself. Recommend IP PT to improve his independence with functional mobility. Pt likely able to trial stand pivot with walker next session if agreeable. Treatment Diagnosis: impaired mobility associated with sick sinus syndrome  PT Education: PT Role;Goals; General Safety; Functional Mobility Training;Plan of Care;Transfer Training  Patient Education: patient would benefit from continued re-inforcement  REQUIRES PT FOLLOW UP: Yes  Activity Tolerance  Activity Tolerance: Patient limited by fatigue;Patient limited by endurance     Patient Diagnosis(es): The primary encounter diagnosis was Symptomatic bradycardia. A diagnosis of Sick sinus syndrome [facility]) was also pertinent to this visit. has a past medical history of BPH (benign prostatic hyperplasia), Carotid stenosis, Cellulitis, CHF (congestive heart failure) ([facility] Chronic back pain, Diverticular disease, GERD (gastroesophageal reflux disease), History of atrial fibrillation, Hypercholesteremia, Hypertension, Lower GI bleed, MDRO (multiple drug resistant organisms) resistance, Neuromuscular disorder [facility] Renal insufficiency, and Vitamin B12 deficiency. 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 Restrictions  Position Activity Restriction  Other position/activity restrictions: up with assist  Subjective   General  Chart Reviewed: Yes  Additional Pertinent Hx: PMH: hypertension, hyperlipidemia, paroxysmal A. fib, BPH. Presented to ED with weakness, fatigue and poor PO intake. Response To Previous Treatment: Not applicable  Family / Caregiver Present: No  Referring Practitioner: Marco Hamlin DO  Subjective  Subjective: \"I guess I can get up if it is to a recliner. \"  General Comment  Comments: The pt presents supine in bed and willing to work with therapy with encouragement. Pain Screening  Patient Currently in Pain: Denies  Vital Signs  Patient Currently in Pain: Denies       Orientation     Cognition   Cognition  Overall Cognitive Status: Exceptions  Following Commands: Follows one step commands consistently; Follows one step commands with increased time; Follows one step commands with repetition  Attention Span: Attends with cues to redirect  Memory: Decreased recall of recent events;Decreased short term memory  Safety Judgement: Decreased awareness of need for assistance  Insights: Decreased awareness of deficits  Initiation: Requires cues for some  Sequencing: Requires cues for some  Objective   Bed mobility  Supine to Sit: Stand by assistance (slow and effortful, head of bed elevated, use of bedrail)  Transfers  Sit to Stand: Contact guard assistance (from EOB to oral stedy and from stedy seat)  Stand to sit: Contact guard assistance  Bed to Chair: Dependent/Total (via oral stedy)  Comment: pt could likely trial stand pivot with RW next visit  Ambulation  Ambulation?: No (pt unable)     Balance  Sitting - Static: Fair  Sitting - Dynamic: Fair;-  Standing - Static: Fair;-  Standing - Dynamic: Poor;-  Comments: Pt sitting balance SBA-CGA EOB.  Pt CGA standing in stedy for pericare and dependent for transfer to chair via stedy            Comment: pt dependent for pericare       Goals  Short term goals  Time Frame for Short term goals: discharge- all goals ongoing  Short term goal 1: patient will perform all bed mobility with min assist  Short term goal 2: patient will perform transfers sit<>stand with SBA  Short term goal 3: patient will perform transfers bed<>chair with moderate assistance via stand pivot  Patient Goals   Patient goals : none stated    Plan    Plan  Times per week: 2-5  Current Treatment Recommendations: Strengthening,Safety Education & Hortencia Nara Training,Patient/Caregiver Education & Training,Functional Mobility Training,Transfer Training  Safety Devices  Type of devices: Nurse notified,Call light within reach,Gait belt,Left in [employer] alarm in place     Therapy Time   Individual Concurrent Group Co-treatment   Time In 1458         Time Out 1536         Minutes 38         Timed Code Treatment Minutes: 23 Minutes    Timed Code Treatment Minutes:  23 Minutes    Total Treatment Minutes:  38 minutes      Papito James, PT

## 2022-01-20 NOTE — PROGRESS NOTES
Attempt to provide shift updates to GISELLA pt's son, but no answer at this time. Voicemail left regarding call back number.     Electronically signed by Elijah Giraldo RN on 3/93/0303 at 6:34 AM

## 2022-01-20 NOTE — CARE COORDINATION
Case Management Assessment            Discharge Note                    Date / Time of Note: 1/20/2022 3:10 PM                  Discharge Note Completed by: ARIC Morales, ARIELW    Patient Name: Sierra Allen   YOB: 1940  Diagnosis: Sick sinus syndrome ([facility] [I49.5]  Weakness [R53.1]  Symptomatic bradycardia [R00.1]  Hypothermia, initial encounter [T68. XXXA]   Date / Time: 1/17/2022  4:45 PM    Current PCP: Tim Matt patient: No    Hospitalization in the last 30 days: Yes    Advance Directives:  Code Status: Full Code  [place] DNR form completed and on chart: No    Financial:  Payor: Duane Perry / Plan: Natasha AMAYA HMO / Product Type: *No Product type* /      Pharmacy:    [address] [place] [address] [phone] - F [phone]  [address] [place] [address]  Phone: [phone] Fax: [phone]      Assistance purchasing medications?: Potential Assistance Purchasing Medications: Yes  Assistance provided by Case Management: None at this time    Does patient want to participate in local refill/ meds to beds program?:      Meds To Beds General Rules:  1. Can ONLY be done Monday- Friday between 8:30am-5pm  2. Prescription(s) must be in pharmacy by 3pm to be filled same day  3. Copy of patient's insurance/ prescription drug card and patient face sheet must be sent along with the prescription(s)  4. Cost of Rx cannot be added to hospital bill. If financial assistance is needed, please contact unit  or ;  or  CANNOT provide pharmacy voucher for patients co-pays  5.  Patients can then  the prescription on their way out of the hospital at discharge, or pharmacy can deliver to the bedside if staff is available. (payment due at time of pick-up or delivery - cash, check, or card accepted)     Able to afford home medications/ co-pay costs: Yes    ADLS:  Current PT treatment goals of Sick sinus syndrome (HCC) [I49.5]  Weakness [R53.1]  Symptomatic bradycardia [R00.1]  Hypothermia, initial encounter [T68. XXXA]    The Patient and/or patient representative Kamaljit Morfin and his family were provided with a choice of provider and agrees with the discharge plan Yes    Freedom of choice list was provided with basic dialogue that supports the patient's individualized plan of care/goals and shares the quality data associated with the providers.  Yes    Care Transitions patient: No    ARIC Serrano, [facility]  Case Management Department  Ph: [phone]  Fax: [phone]

## 2022-01-20 NOTE — PLAN OF CARE
Problem: Falls - Risk of:  Goal: Will remain free from falls  Description: Will remain free from falls  Outcome: Ongoing  Note: Pt high fall risk per friend fall scale, call light within reach, bed low, side rails upx2. Problem: Cardiac:  Goal: Ability to maintain vital signs within normal range will improve  Description: Ability to maintain vital signs within normal range will improve  Outcome: Ongoing  Note: Pt VS remain hemodynamically  stable, free from arrhythmias, continue to monitor VSS per order.

## 2022-01-20 NOTE — PROGRESS NOTES
Podiatric Surgery Daily Progress Note      Admit Date: 1/17/2022                           Code:Full Code    Patient seen and examined, labs and records reviewed    Subjective:     Patient seen and examined at bedside this AM.  Patient denies any overnight acute event. Patient denies f/c/n/v/sob/cp. Patient laying comfortably in bed. Patient is happy to see that his legs have been getting smaller and smaller after each dressing changes. Objective     /63   Pulse 64   Temp 98.1 °F (36.7 °C) (Oral)   Resp 14   Ht 5' 11\" (1.803 m)   Wt 207 lb 3.7 oz (94 kg)   SpO2 97%   BMI 28.90 kg/m²      I/O:    Intake/Output Summary (Last 24 hours) at 1/20/2022 0559  Last data filed at 1/19/2022 2305  Gross per 24 hour   Intake 370 ml   Output 1825 ml   Net -1455 ml              Wt Readings from Last 3 Encounters:   01/20/22 207 lb 3.7 oz (94 kg)   12/28/21 185 lb (83.9 kg)   11/10/21 184 lb 4.8 oz (83.6 kg)       LABS:    Recent Labs     01/18/22  0618 01/19/22  0634   WBC 4.9 6.7   HGB 8.8* 8.1*   HCT 28.0* 25.0*    138        Recent Labs     01/19/22  0633      K 4.9      CO2 26   BUN 30*   CREATININE 1.1        Recent Labs     01/18/22  0618 01/19/22  0633   PROT 7.1 6.9       LOWER EXTREMITY EXAMINATION    Dressing to RIght LE intact. No strikethrough drainage noted to the external dressing. No strikethrough drainage noted to the internal dressing layer. VASCULAR:   - DP and PT pulses are non palpable b/l. - Upon hand-held Doppler biphasic signals noted to DP and PT B/L LE.  - CFT less than 3 seconds to the distal digits of the foot b/l.   - Skin temperature is warm to cool from proximal to distal with no focal calor noted.    - Relaxed skin tension lines 2/2 Ace compressive therapy with improving edema.  - No pain with calf compression b/l.     NEUROLOGIC:   - Gross and epicritic sensation is intact b/l.   - Protective sensation is appreciated at all pedal sites b/l.     DERMATOLOGIC:   Dermatological changes noted B/L leg     Right lower extremity:    -Partial thickness ulceration noted medial aspect of anterior shin.  -Wound measures approximately 3.0 cm x 2.4 cm x superficial. -Improving  -Wound base dermal and epithelialized tissue.  -Wound does not probe to bone, tunnels, or tracks.  -No fluctuance, crepitus, malodor, or drainage noted. -Partial-thickness wound has been clinically stable since admission with no acute signs of infection. Right lower extremity:  -Multiple dried serous crust anterior aspect of shin 2/2 scratching.  -Dried serous crust adhered and without any open wounds noted.  -Wounds have been clinically stable since admission with no acute signs of infection.     MUSCULOSKELETAL:   - Muscle strength is 3/5 for all pedal groups tested. - No pain with palpation of the foot or ankle b/l. - Ankle joint ROM is decreased in dorsiflexion with the knee extended.          IMAGING:  Arterial duplex; 1/18/2022      Extremities Arteries:Lower Extremities Arterial Duplex, VL LOWER EXTREMITY    ARTERIES DUPLEX RIGHT.       Tech Comments   Right   The right ankle/brachial index is 1.5 (the DP is 120 and the PT is 168 mmHg). The common femoral artery demonstrates multiphasic flow indicating no   significant aorto-iliac inflow disease. Elevated velocities at the mid popliteal artery indicate a > 50% stenosis by   velocity criteria (velocity went from 79 to 197 cm/s). The peroneal artery could not be visualized (possibly due to extensive edema). Abnormal monophasic Doppler waveforms were noted in the tibial arteries at the   ankle. There were no previous studies to use for comparison. Left   The left ankle/brachial index is 1.32 (the DP could not be performed due to   foot spasms and the PT is 148 mmHg).        Venous reflux insufficiency test; 1/18/2022, pending      Right tib-fib x-ray; 1/18/2022   Narrative   4 views of the right tibia/fibula     HISTORY: Pain.       FINDINGS:       No acute fracture is seen. The alignment is maintained. The soft tissue structures are clear. There are vascular calcifications.           Impression       No acute findings. ASSESSMENT/PLAN  -Chronic venous stasis partial-thickness ulceration, right lower extremity (POA) 2/2 fluid overload - improving  -Chronic venous stasis dermatitis  -Lymphedema B/L LE  2/2 fluid overload -improving  -Peripheral vascular disease      -Patient seen and examined at bedside this AM  -VSS, afebrile, no leukocytosis noted   -ESR and CRP; pending  -Blood cultures 1 & 2; NGTD  -All imaging reviewed; see impression above  -Ordered venous reflux insufficiency; follow-up results  -Wound culture not obtained at this time 2/2 no active drainage and would only colonized skin anne. -Infectious disease following; continue off antibiotics. -Vascular surgery following; no vascular surgery indicated and follow-up outpatient with Dr. Nadyne Schaumann. -Right lower extremity dressed with Adaptic, DSD, and Ace bandage  -Left lower extremity dressed with DSD, Ace bandage.  -Full weightbearing to B/L LE  -Lengthy discussion with patient regarding the importance of extremity elevation and edema control, patient voiced understanding        DISPO: Chronic venous stasis dermatitis with partial thickness ulceration to the right lower extremity clinically improving daily. We will follow-up on venous reflux study. No surgical intervention from podiatry standpoint. Patient okay for discharge from podiatry standpoint pending medically stable.   Patient can follow-up with either Dr. Idania Acosta or the [facility] wound care clinic 1 week after being discharged from the hospital.      Discussed assessment and plan with Dr. Erica Ruby DPM   Podiatric Resident, PGY-3  Pager: [phone] or Perfect serve   1/20/2022  5:59 AM

## 2022-01-20 NOTE — PROGRESS NOTES
Occupational Therapy  Facility/Department: [place] [address] [facility]  Daily Treatment Note  NAME: Urvashi Angelo  : 1940  MRN: [id number]    Date of Service: 2022    Discharge Recommendations:    Urvashi Angelo scored a 13/24 on the AM-PAC ADL Inpatient form. Current research shows that an AM-PAC score of 17 or less is typically not associated with a discharge to the patient's home setting. Based on the patient's AM-PAC score and their current ADL deficits, it is recommended that the patient have 3-5 sessions per week of Occupational Therapy at d/c to increase the patient's independence. Please see assessment section for further patient specific details. If patient discharges prior to next session this note will serve as a discharge summary. Please see below for the latest assessment towards goals. Assessment   Performance deficits / Impairments: Decreased functional mobility ; Decreased ADL status; Decreased safe awareness;Decreased endurance;Decreased balance  Assessment: pt with increased strength and activity tolerance this date demoing ability to maintain upright stance in stedy frame with CGA. Pt also demos increased initiation this date. Pt with little motivation for OOB activity, educated on benefits of therapy, pt receptive. Cont. OT POC. Treatment Diagnosis: functional mobility/ADL deficit  OT Education: OT Role;Plan of Care;Transfer Training;ADL Adaptive Strategies  Patient Education: needs reinforcing, educated on benefit of sitting EOB and OOB activity. Pt with self limiting behavior and decreased motivation for activity. REQUIRES OT FOLLOW UP: Yes  Activity Tolerance  Activity Tolerance: Patient Tolerated treatment well  Activity Tolerance: pt with improved strength and activity tolerance this date  Safety Devices  Safety Devices in place: Yes  Type of devices: Left in chair;Call light within reach;Nurse notified; Chair alarm in place         Patient Diagnosis(es): The primary encounter diagnosis was Symptomatic bradycardia. A diagnosis of Sick sinus syndrome [facility]) was also pertinent to this visit. has a past medical history of BPH (benign prostatic hyperplasia), Carotid stenosis, Cellulitis, CHF (congestive heart failure) ([facility] [address] Chronic back pain, Diverticular disease, GERD (gastroesophageal reflux disease), History of atrial fibrillation, Hypercholesteremia, Hypertension, Lower GI bleed, MDRO (multiple drug resistant organisms) resistance, Neuromuscular disorder ([facility] [address] Renal insufficiency, and Vitamin B12 deficiency. 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 Restrictions  Position Activity Restriction  Other position/activity restrictions: up with assist  Subjective   General  Chart Reviewed: Yes  Patient assessed for rehabilitation services?: Yes  Additional Pertinent Hx: PMH: hypertension, hyperlipidemia, paroxysmal A. fib, BPH. Presented to ED with weakness, fatigue and poor PO intake. Family / Caregiver Present: No  Referring Practitioner: Ananya  Diagnosis: sick sinus syndrome  Subjective  Subjective: Pt in bed upon arrival. Agreeable to OT/PT treatment with encouragement. \"I'll get out of bed if I can go to the recliner\"  Vital Signs  Patient Currently in Pain: Denies   Orientation  Orientation  Overall Orientation Status: Within Functional Limits  Objective    ADL  Grooming: Setup (pt washed face with setup seated in recliner)  LE Dressing: Dependent/Total (to don B socks and boots)  Toileting: Dependent/Total  Additional Comments: pt dependent for incont. of bowels. Pericare while standing in [address] [place] [address] frame.         Balance  Sitting Balance: Stand by assistance (EOB for ~5 min.)  Standing Balance: Contact guard assistance  Standing Balance  Time: ~5 min total  Activity: stance in oral stedy frame for pericare, supported  oral [facility]  Comment: CGA standing upright in [facility] frame  Bed mobility  Supine to Sit: Stand by assistance  Transfers  Sit to stand: Contact guard assistance  Stand to sit: Contact guard assistance  Transfer Comments: in oral stedy frame                       Cognition  Overall Cognitive Status: Exceptions  Following Commands: Follows one step commands consistently; Follows one step commands with increased time; Follows one step commands with repetition  Attention Span: Attends with cues to redirect  Memory: Decreased recall of recent events;Decreased short term memory  Safety Judgement: Decreased awareness of need for assistance  Insights: Decreased awareness of deficits  Initiation: Requires cues for some  Sequencing: Requires cues for some                                         Plan   Plan  Times per week: 2-5  Times per day: Daily  Specific instructions for Next Treatment: attempt/tolerate transfer with RW  Current Treatment Recommendations: Functional Mobility Training,Endurance Training,Balance Training,Self-Care / ADL,Safety Education & Training,Patient/Caregiver Education & Training                                                    AM-PAC Score        AM-[facility] Inpatient Daily Activity Raw Score: 13 (01/20/22 1553)  AM-PAC Inpatient ADL T-Scale Score : 32.03 (01/20/22 1553)  ADL Inpatient CMS 0-100% Score: 63.03 (01/20/22 1553)  ADL Inpatient CMS G-Code Modifier : CL (01/20/22 1553)    Goals  Short term goals  Time Frame for Short term goals: DC  Short term goal 1: pt will complete sit to stand with RW with CGA-not met  Short term goal 2: pt will complete BSC transfer with min a-not met  Short term goal 3: pt will increase sitting tolerance to 10 min EOB-ongoing  Patient Goals   Patient goals : not stated       Therapy Time   Individual Concurrent Group Co-treatment   Time In 0210         Time Out 7026

## 2022-01-21 ENCOUNTER — NURSE ONLY (OUTPATIENT)
Dept: CARDIOLOGY | Age: 82
End: 2022-01-21

## 2022-01-21 NOTE — DISCHARGE SUMMARY
Hospital Medicine Discharge Summary      Patient ID: Lacy Ratel      Patient's PCP: Latasha Sky    Admit Date: 1/17/2022     Discharge Date:   1/20/2022    Admitting Physician: Sophie Sweeney DO    Discharge Physician: Roger Burleson MD     Discharge Diagnoses: Active Hospital Problems    Diagnosis Date Noted    Bilateral leg edema [R60.0] 01/20/2022    Symptomatic bradycardia [R00.1]     Weakness [R53.1] 01/17/2022    Bradycardia [R00.1]     Bacteriuria [R82.71] 11/20/2019    Hypothermia [T68. XXXA] 01/03/2019         The patient was seen and examined on day of discharge and this discharge summary is in conjunction with any daily progress note from day of discharge. Hospital Course:     Patient is a [de-identified] y.o. male who presented to [facility] with past medical history of hypertension, hyperlipidemia, paroxysmal A. fib, BPH presented to the ED with a chief complaint of weakness and fatigue. In the ER patient was noted to be bradycardic and later on became hypothermic, he was placed on [employer]. He was not on remington agents at baseline. Heart rate normalized after improvement in his temperature. He was seen by cardiology and no acute intervention was required. Sepsis work-up was negative. Iglesias catheter, which is chronic for the patient, was exchanged. Patient was seen by infectious disease, cardiology, podiatry surgery, vascular surgery. He had significant leg swelling which improved with compression dressings and Lasix. Norvasc was stopped on discharge as it might be contributing to leg edema. Patient is chronically bedbound at baseline. He was out of skilled nursing days and did not want to have long-term placement. Patient was discharged home in stable condition with home care.      Consults:     IP CONSULT TO HOSPITALIST  IP CONSULT TO CARDIOLOGY  IP CONSULT TO SOCIAL WORK  IP CONSULT TO DIETITIAN  IP CONSULT TO INFECTIOUS DISEASES  IP CONSULT TO PODIATRY  IP CONSULT TO HOME CARE NEEDS  IP CONSULT TO VASCULAR SURGERY    Disposition:  Home     Discharged Condition: Stable    Code Status: Full Code    Activity: activity as tolerated    Diet: regular diet    Follow Up: Primary Care Physician in one week    Exam:     General appearance: No apparent distress, appears stated age and cooperative. Lungs: Clear to ascultation, bilaterally without Rales/Wheezes/Rhonchi with good respiratory effort. Heart: Regular rate and rhythm with Normal S1/S2 without  murmurs, rubs or gallops, point of maximum impulse non-displaced  Abdomen: Soft, non-tender or non-distended without rigidity or guarding and positive bowel sounds all four quadrants. Extremities: No clubbing, cyanosis, improving leg edema  Skin: Venous stasis dermatitis, healing leg wounds  Neurologic: Alert and oriented X 3, chronic lower extremity paraplegia   mental status: Alert, oriented, thought content appropriate      Labs:  For convenience and continuity at follow-up the following most recent labs are provided:    CBC:   Lab Results   Component Value Date    WBC 6.0 01/20/2022    HGB 7.9 01/20/2022    HCT 24.4 01/20/2022     01/20/2022       RENAL:   Lab Results   Component Value Date     01/20/2022    K 4.8 01/20/2022     01/20/2022    CO2 25 01/20/2022    BUN 36 01/20/2022    CREATININE 1.1 01/20/2022           Discharge Medications:   Current Discharge Medication List           Details   albuterol sulfate HFA (PROVENTIL HFA) 108 (90 Base) MCG/ACT inhaler Inhale 2 puffs into the lungs every 6 hours as needed for Wheezing or Shortness of Breath  Qty: 18 g, Refills: 3      guaiFENesin (MUCINEX) 600 MG extended release tablet Take 1 tablet by mouth 2 times daily as needed for Congestion  Qty: 60 tablet, Refills: 2      ferrous sulfate (IRON 325) 325 (65 Fe) MG tablet Take 1 tablet by mouth 2 times daily  Qty: 180 tablet, Refills: 1              Details   gabapentin (NEURONTIN) 600 MG tablet Take 600 mg by mouth 2 times daily. aspirin 81 MG chewable tablet Take 1 tablet by mouth daily  Qty: 30 tablet, Refills: 3      pantoprazole (PROTONIX) 40 MG tablet Take 1 tablet by mouth every morning (before breakfast)  Qty: 30 tablet, Refills: 3      tamsulosin (FLOMAX) 0.4 MG capsule Take 0.8 mg by mouth daily       Melatonin 10 MG TABS Take 10 mg by mouth nightly as needed       atorvastatin (LIPITOR) 80 MG tablet Take 80 mg by mouth nightly. zolpidem (AMBIEN) 10 MG tablet Take 10 mg by mouth nightly as needed for Sleep. !! docusate sodium (COLACE) 100 MG capsule Take 1 capsule by mouth daily as needed for Constipation  Qty: 30 capsule, Refills: 0      furosemide (LASIX) 40 MG tablet TAKE 1 TABLET TWICE DAILY  Qty: 180 tablet, Refills: 3      Balsam [place]-Castor Oil (VENELEX) OINT ointment Apply topically daily as needed      !! docusate sodium (COLACE) 100 MG capsule Take 100 mg by mouth daily as needed for Constipation      dicyclomine (BENTYL) 20 MG tablet Take 20 mg by mouth 3 times daily as needed      vitamin B-12 (CYANOCOBALAMIN) 1000 MCG tablet Take 1,000 mcg by mouth daily       ! ! - Potential duplicate medications found. Please discuss with provider. Time Spent on discharge is more than 30 minutes in the examination, evaluation, counseling and review of medications and discharge plan. Signed:  Juan Carlos Begum MD   1/20/2022      Thank you INES Marie for the opportunity to be involved in this patient's care. If you have any questions or concerns please feel free to contact me at [phone].

## 2022-01-21 NOTE — PROGRESS NOTES
2 week CAM monitor ordered per Sherif Caldera CNP for bradycardia to be placed on patient at discharge. Pt was unexpectantly discharged last night at 10 pm with no one available to place monitor. Monitor will be mailed to patient. Follow up appointment made.

## 2022-01-21 NOTE — PROGRESS NOTES
Report  Given to [facility] ambulance transport. PIVs removed, pt dc'd to home. Daughter updated.

## 2022-01-22 LAB
BLOOD CULTURE, ROUTINE: NORMAL
CULTURE, BLOOD 2: NORMAL

## 2022-01-26 ENCOUNTER — APPOINTMENT (OUTPATIENT)
Dept: GENERAL RADIOLOGY | Age: 82
DRG: 643 | End: 2022-01-26
Payer: MEDICARE

## 2022-01-26 ENCOUNTER — APPOINTMENT (OUTPATIENT)
Dept: CT IMAGING | Age: 82
DRG: 643 | End: 2022-01-26
Payer: MEDICARE

## 2022-01-26 ENCOUNTER — HOSPITAL ENCOUNTER (INPATIENT)
Age: 82
LOS: 11 days | Discharge: NURSING, REHAB OR LONG-TERM CARE | DRG: 643 | End: 2022-02-07
Attending: STUDENT IN AN ORGANIZED HEALTH CARE EDUCATION/TRAINING PROGRAM | Admitting: INTERNAL MEDICINE
Payer: MEDICARE
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
## 2022-01-26 DIAGNOSIS — T68.XXXA HYPOTHERMIA, INITIAL ENCOUNTER: Primary | ICD-10-CM

## 2022-01-26 LAB
A/G RATIO: 1.1 (ref 1.1–2.2)
ALBUMIN SERPL-MCNC: 4 G/DL (ref 3.4–5)
ALP BLD-CCNC: 168 U/L (ref 40–129)
ALT SERPL-CCNC: 30 U/L (ref 10–40)
ANION GAP SERPL CALCULATED.3IONS-SCNC: 10 MMOL/L (ref 3–16)
AST SERPL-CCNC: 30 U/L (ref 15–37)
BACTERIA: ABNORMAL /HPF
BASE EXCESS VENOUS: 1.8 MMOL/L (ref -2–3)
BASOPHILS ABSOLUTE: 0 K/UL (ref 0–0.2)
BASOPHILS RELATIVE PERCENT: 0.4 %
BILIRUB SERPL-MCNC: 0.4 MG/DL (ref 0–1)
BILIRUBIN URINE: NEGATIVE
BLOOD, URINE: ABNORMAL
BUN BLDV-MCNC: 44 MG/DL (ref 7–20)
CALCIUM SERPL-MCNC: 9.3 MG/DL (ref 8.3–10.6)
CARBOXYHEMOGLOBIN: 1.1 % (ref 0–1.5)
CHLORIDE BLD-SCNC: 108 MMOL/L (ref 99–110)
CLARITY: CLEAR
CO2: 28 MMOL/L (ref 21–32)
COLOR: YELLOW
CREAT SERPL-MCNC: 1.3 MG/DL (ref 0.8–1.3)
EOSINOPHILS ABSOLUTE: 0.1 K/UL (ref 0–0.6)
EOSINOPHILS RELATIVE PERCENT: 1.8 %
EPITHELIAL CELLS, UA: ABNORMAL /HPF (ref 0–5)
GFR AFRICAN AMERICAN: >60
GFR NON-AFRICAN AMERICAN: 53
GLUCOSE BLD-MCNC: 91 MG/DL (ref 70–99)
GLUCOSE URINE: NEGATIVE MG/DL
HCO3 VENOUS: 28.8 MMOL/L (ref 24–28)
HCT VFR BLD CALC: 31.3 % (ref 40.5–52.5)
HEMOGLOBIN, VEN, REDUCED: 15.5 %
HEMOGLOBIN: 9.8 G/DL (ref 13.5–17.5)
KETONES, URINE: NEGATIVE MG/DL
LACTIC ACID: 1.1 MMOL/L (ref 0.4–2)
LEUKOCYTE ESTERASE, URINE: ABNORMAL
LYMPHOCYTES ABSOLUTE: 0.7 K/UL (ref 1–5.1)
LYMPHOCYTES RELATIVE PERCENT: 9.6 %
MCH RBC QN AUTO: 28 PG (ref 26–34)
MCHC RBC AUTO-ENTMCNC: 31.2 G/DL (ref 31–36)
MCV RBC AUTO: 89.7 FL (ref 80–100)
METHEMOGLOBIN VENOUS: 0.4 % (ref 0–1.5)
MICROSCOPIC EXAMINATION: YES
MONOCYTES ABSOLUTE: 0.3 K/UL (ref 0–1.3)
MONOCYTES RELATIVE PERCENT: 4.3 %
MUCUS: ABNORMAL /LPF
NEUTROPHILS ABSOLUTE: 5.7 K/UL (ref 1.7–7.7)
NEUTROPHILS RELATIVE PERCENT: 83.9 %
NITRITE, URINE: NEGATIVE
O2 SAT, VEN: 84 %
PCO2, VEN: 56.1 MMHG (ref 41–51)
PDW BLD-RTO: 17.3 % (ref 12.4–15.4)
PH UA: 6 (ref 5–8)
PH VENOUS: 7.32 (ref 7.35–7.45)
PLATELET # BLD: 140 K/UL (ref 135–450)
PMV BLD AUTO: 9.2 FL (ref 5–10.5)
PO2, VEN: 55.4 MMHG (ref 25–40)
POTASSIUM REFLEX MAGNESIUM: 4.7 MMOL/L (ref 3.5–5.1)
PRO-BNP: 387 PG/ML (ref 0–449)
PROTEIN UA: 30 MG/DL
RBC # BLD: 3.5 M/UL (ref 4.2–5.9)
RBC UA: ABNORMAL /HPF (ref 0–4)
SODIUM BLD-SCNC: 146 MMOL/L (ref 136–145)
SPECIFIC GRAVITY UA: >=1.03 (ref 1–1.03)
TCO2 CALC VENOUS: 31 MMOL/L
TOTAL CK: 225 U/L (ref 39–308)
TOTAL PROTEIN: 7.8 G/DL (ref 6.4–8.2)
TROPONIN: 0.05 NG/ML
TROPONIN: 0.06 NG/ML
URINE REFLEX TO CULTURE: ABNORMAL
URINE TYPE: ABNORMAL
UROBILINOGEN, URINE: 0.2 E.U./DL
WBC # BLD: 6.7 K/UL (ref 4–11)
WBC UA: ABNORMAL /HPF (ref 0–5)

## 2022-01-26 PROCEDURE — 87077 CULTURE AEROBIC IDENTIFY: CPT

## 2022-01-26 PROCEDURE — 87086 URINE CULTURE/COLONY COUNT: CPT

## 2022-01-26 PROCEDURE — 36415 COLL VENOUS BLD VENIPUNCTURE: CPT

## 2022-01-26 PROCEDURE — 83605 ASSAY OF LACTIC ACID: CPT

## 2022-01-26 PROCEDURE — 81001 URINALYSIS AUTO W/SCOPE: CPT

## 2022-01-26 PROCEDURE — 71045 X-RAY EXAM CHEST 1 VIEW: CPT

## 2022-01-26 PROCEDURE — 96360 HYDRATION IV INFUSION INIT: CPT

## 2022-01-26 PROCEDURE — 93005 ELECTROCARDIOGRAM TRACING: CPT | Performed by: STUDENT IN AN ORGANIZED HEALTH CARE EDUCATION/TRAINING PROGRAM

## 2022-01-26 PROCEDURE — 72125 CT NECK SPINE W/O DYE: CPT

## 2022-01-26 PROCEDURE — 87040 BLOOD CULTURE FOR BACTERIA: CPT

## 2022-01-26 PROCEDURE — 73502 X-RAY EXAM HIP UNI 2-3 VIEWS: CPT

## 2022-01-26 PROCEDURE — 84484 ASSAY OF TROPONIN QUANT: CPT

## 2022-01-26 PROCEDURE — 82550 ASSAY OF CK (CPK): CPT

## 2022-01-26 PROCEDURE — 87150 DNA/RNA AMPLIFIED PROBE: CPT

## 2022-01-26 PROCEDURE — 99285 EMERGENCY DEPT VISIT HI MDM: CPT

## 2022-01-26 PROCEDURE — 87186 SC STD MICRODIL/AGAR DIL: CPT

## 2022-01-26 PROCEDURE — 83880 ASSAY OF NATRIURETIC PEPTIDE: CPT

## 2022-01-26 PROCEDURE — 2580000003 HC RX 258: Performed by: STUDENT IN AN ORGANIZED HEALTH CARE EDUCATION/TRAINING PROGRAM

## 2022-01-26 PROCEDURE — 82803 BLOOD GASES ANY COMBINATION: CPT

## 2022-01-26 PROCEDURE — 70450 CT HEAD/BRAIN W/O DYE: CPT

## 2022-01-26 PROCEDURE — 80053 COMPREHEN METABOLIC PANEL: CPT

## 2022-01-26 PROCEDURE — 73590 X-RAY EXAM OF LOWER LEG: CPT

## 2022-01-26 PROCEDURE — 85025 COMPLETE CBC W/AUTO DIFF WBC: CPT

## 2022-01-26 RX ORDER — SODIUM CHLORIDE, SODIUM LACTATE, POTASSIUM CHLORIDE, AND CALCIUM CHLORIDE .6; .31; .03; .02 G/100ML; G/100ML; G/100ML; G/100ML
1000 INJECTION, SOLUTION INTRAVENOUS ONCE
Status: COMPLETED | OUTPATIENT
Start: 2022-01-26 | End: 2022-01-26

## 2022-01-26 RX ADMIN — SODIUM CHLORIDE, POTASSIUM CHLORIDE, SODIUM LACTATE AND CALCIUM CHLORIDE 1000 ML: 600; 310; 30; 20 INJECTION, SOLUTION INTRAVENOUS at 21:48

## 2022-01-26 ASSESSMENT — ENCOUNTER SYMPTOMS
CHEST TIGHTNESS: 0
EYES NEGATIVE: 1
SHORTNESS OF BREATH: 0
COUGH: 0
NAUSEA: 0
VOMITING: 0
ABDOMINAL PAIN: 0
DIARRHEA: 0
CONSTIPATION: 0

## 2022-01-26 NOTE — Clinical Note
Patient Class: Inpatient [101]   REQUIRED: Diagnosis: Hypothermia, initial encounter [632002]   Estimated Length of Stay: Estimated stay of more than 2 midnights   Admitting Provider: Kamilah Valle [[id number]]   Telemetry/Cardiac Monitoring Required?: Yes

## 2022-01-27 ENCOUNTER — APPOINTMENT (OUTPATIENT)
Dept: CT IMAGING | Age: 82
DRG: 643 | End: 2022-01-27
Payer: MEDICARE

## 2022-01-27 PROBLEM — R00.1 SYMPTOMATIC SINUS BRADYCARDIA: Status: ACTIVE | Noted: 2022-01-27

## 2022-01-27 LAB
BASOPHILS ABSOLUTE: 0 K/UL (ref 0–0.2)
BASOPHILS RELATIVE PERCENT: 0.7 %
EKG ATRIAL RATE: 53 BPM
EKG DIAGNOSIS: NORMAL
EKG Q-T INTERVAL: 486 MS
EKG QRS DURATION: 82 MS
EKG QTC CALCULATION (BAZETT): 447 MS
EKG R AXIS: 31 DEGREES
EKG T AXIS: 26 DEGREES
EKG VENTRICULAR RATE: 51 BPM
EOSINOPHILS ABSOLUTE: 0.1 K/UL (ref 0–0.6)
EOSINOPHILS RELATIVE PERCENT: 2.1 %
HCT VFR BLD CALC: 26.9 % (ref 40.5–52.5)
HEMOGLOBIN: 8.6 G/DL (ref 13.5–17.5)
LYMPHOCYTES ABSOLUTE: 0.6 K/UL (ref 1–5.1)
LYMPHOCYTES RELATIVE PERCENT: 12.3 %
MAGNESIUM: 2.6 MG/DL (ref 1.8–2.4)
MCH RBC QN AUTO: 28.6 PG (ref 26–34)
MCHC RBC AUTO-ENTMCNC: 32.1 G/DL (ref 31–36)
MCV RBC AUTO: 88.8 FL (ref 80–100)
MONOCYTES ABSOLUTE: 0.2 K/UL (ref 0–1.3)
MONOCYTES RELATIVE PERCENT: 4.4 %
NEUTROPHILS ABSOLUTE: 3.8 K/UL (ref 1.7–7.7)
NEUTROPHILS RELATIVE PERCENT: 80.5 %
PDW BLD-RTO: 17.6 % (ref 12.4–15.4)
PLATELET # BLD: 128 K/UL (ref 135–450)
PMV BLD AUTO: 9.2 FL (ref 5–10.5)
PROCALCITONIN: 0.12 NG/ML (ref 0–0.15)
RAPID INFLUENZA  B AGN: POSITIVE
RAPID INFLUENZA A AGN: NEGATIVE
RBC # BLD: 3.02 M/UL (ref 4.2–5.9)
SARS-COV-2, NAAT: NOT DETECTED
T4 FREE: 1 NG/DL (ref 0.9–1.8)
TSH REFLEX: 5.4 UIU/ML (ref 0.27–4.2)
WBC # BLD: 4.8 K/UL (ref 4–11)

## 2022-01-27 PROCEDURE — 87635 SARS-COV-2 COVID-19 AMP PRB: CPT

## 2022-01-27 PROCEDURE — 6370000000 HC RX 637 (ALT 250 FOR IP)

## 2022-01-27 PROCEDURE — 85025 COMPLETE CBC W/AUTO DIFF WBC: CPT

## 2022-01-27 PROCEDURE — 2580000003 HC RX 258

## 2022-01-27 PROCEDURE — 6370000000 HC RX 637 (ALT 250 FOR IP): Performed by: STUDENT IN AN ORGANIZED HEALTH CARE EDUCATION/TRAINING PROGRAM

## 2022-01-27 PROCEDURE — 1200000000 HC SEMI PRIVATE

## 2022-01-27 PROCEDURE — 87804 INFLUENZA ASSAY W/OPTIC: CPT

## 2022-01-27 PROCEDURE — 84443 ASSAY THYROID STIM HORMONE: CPT

## 2022-01-27 PROCEDURE — 97162 PT EVAL MOD COMPLEX 30 MIN: CPT

## 2022-01-27 PROCEDURE — 99223 1ST HOSP IP/OBS HIGH 75: CPT | Performed by: NURSE PRACTITIONER

## 2022-01-27 PROCEDURE — 6360000002 HC RX W HCPCS

## 2022-01-27 PROCEDURE — 97535 SELF CARE MNGMENT TRAINING: CPT

## 2022-01-27 PROCEDURE — 71260 CT THORAX DX C+: CPT

## 2022-01-27 PROCEDURE — 83735 ASSAY OF MAGNESIUM: CPT

## 2022-01-27 PROCEDURE — 6360000004 HC RX CONTRAST MEDICATION: Performed by: STUDENT IN AN ORGANIZED HEALTH CARE EDUCATION/TRAINING PROGRAM

## 2022-01-27 PROCEDURE — 97166 OT EVAL MOD COMPLEX 45 MIN: CPT

## 2022-01-27 PROCEDURE — 97530 THERAPEUTIC ACTIVITIES: CPT

## 2022-01-27 PROCEDURE — 84439 ASSAY OF FREE THYROXINE: CPT

## 2022-01-27 PROCEDURE — 99223 1ST HOSP IP/OBS HIGH 75: CPT | Performed by: INTERNAL MEDICINE

## 2022-01-27 PROCEDURE — 84145 PROCALCITONIN (PCT): CPT

## 2022-01-27 PROCEDURE — 36415 COLL VENOUS BLD VENIPUNCTURE: CPT

## 2022-01-27 RX ORDER — ALBUTEROL SULFATE 2.5 MG/3ML
2.5 SOLUTION RESPIRATORY (INHALATION) EVERY 6 HOURS PRN
Status: DISCONTINUED | OUTPATIENT
Start: 2022-01-27 | End: 2022-02-07 | Stop reason: HOSPADM

## 2022-01-27 RX ORDER — MECOBALAMIN 5000 MCG
10 TABLET,DISINTEGRATING ORAL NIGHTLY PRN
Status: DISCONTINUED | OUTPATIENT
Start: 2022-01-27 | End: 2022-02-07 | Stop reason: HOSPADM

## 2022-01-27 RX ORDER — DOCUSATE SODIUM 100 MG/1
100 CAPSULE, LIQUID FILLED ORAL DAILY PRN
Status: DISCONTINUED | OUTPATIENT
Start: 2022-01-27 | End: 2022-01-27

## 2022-01-27 RX ORDER — LANOLIN ALCOHOL/MO/W.PET/CERES
1000 CREAM (GRAM) TOPICAL DAILY
Status: DISCONTINUED | OUTPATIENT
Start: 2022-01-27 | End: 2022-02-07 | Stop reason: HOSPADM

## 2022-01-27 RX ORDER — ONDANSETRON 4 MG/1
4 TABLET, ORALLY DISINTEGRATING ORAL EVERY 8 HOURS PRN
Status: DISCONTINUED | OUTPATIENT
Start: 2022-01-27 | End: 2022-02-07 | Stop reason: HOSPADM

## 2022-01-27 RX ORDER — BISACODYL 10 MG
10 SUPPOSITORY, RECTAL RECTAL DAILY
Status: DISCONTINUED | OUTPATIENT
Start: 2022-01-27 | End: 2022-02-07 | Stop reason: HOSPADM

## 2022-01-27 RX ORDER — OSELTAMIVIR PHOSPHATE 30 MG/1
30 CAPSULE ORAL 2 TIMES DAILY
Status: DISPENSED | OUTPATIENT
Start: 2022-01-27 | End: 2022-02-01

## 2022-01-27 RX ORDER — DOCUSATE SODIUM 100 MG/1
200 CAPSULE, LIQUID FILLED ORAL 2 TIMES DAILY
Status: DISCONTINUED | OUTPATIENT
Start: 2022-01-27 | End: 2022-02-07 | Stop reason: HOSPADM

## 2022-01-27 RX ORDER — ACETAMINOPHEN 650 MG/1
650 SUPPOSITORY RECTAL EVERY 6 HOURS PRN
Status: DISCONTINUED | OUTPATIENT
Start: 2022-01-27 | End: 2022-02-07 | Stop reason: HOSPADM

## 2022-01-27 RX ORDER — DICYCLOMINE HCL 20 MG
20 TABLET ORAL 3 TIMES DAILY PRN
Status: DISCONTINUED | OUTPATIENT
Start: 2022-01-27 | End: 2022-02-07 | Stop reason: HOSPADM

## 2022-01-27 RX ORDER — PANTOPRAZOLE SODIUM 40 MG/1
40 TABLET, DELAYED RELEASE ORAL
Status: DISCONTINUED | OUTPATIENT
Start: 2022-01-27 | End: 2022-02-07 | Stop reason: HOSPADM

## 2022-01-27 RX ORDER — ONDANSETRON 2 MG/ML
4 INJECTION INTRAMUSCULAR; INTRAVENOUS EVERY 6 HOURS PRN
Status: DISCONTINUED | OUTPATIENT
Start: 2022-01-27 | End: 2022-02-07 | Stop reason: HOSPADM

## 2022-01-27 RX ORDER — ASPIRIN 81 MG/1
81 TABLET, CHEWABLE ORAL DAILY
Status: DISCONTINUED | OUTPATIENT
Start: 2022-01-27 | End: 2022-02-07 | Stop reason: HOSPADM

## 2022-01-27 RX ORDER — GUAIFENESIN 600 MG/1
600 TABLET, EXTENDED RELEASE ORAL 2 TIMES DAILY PRN
Status: DISCONTINUED | OUTPATIENT
Start: 2022-01-27 | End: 2022-02-07 | Stop reason: HOSPADM

## 2022-01-27 RX ORDER — SODIUM CHLORIDE 9 MG/ML
INJECTION, SOLUTION INTRAVENOUS CONTINUOUS
Status: DISCONTINUED | OUTPATIENT
Start: 2022-01-27 | End: 2022-01-28

## 2022-01-27 RX ORDER — OSELTAMIVIR PHOSPHATE 75 MG/1
75 CAPSULE ORAL ONCE
Status: COMPLETED | OUTPATIENT
Start: 2022-01-27 | End: 2022-01-27

## 2022-01-27 RX ORDER — SODIUM CHLORIDE 0.9 % (FLUSH) 0.9 %
5-40 SYRINGE (ML) INJECTION EVERY 12 HOURS SCHEDULED
Status: DISCONTINUED | OUTPATIENT
Start: 2022-01-27 | End: 2022-02-07 | Stop reason: HOSPADM

## 2022-01-27 RX ORDER — ACETAMINOPHEN 325 MG/1
650 TABLET ORAL EVERY 6 HOURS PRN
Status: DISCONTINUED | OUTPATIENT
Start: 2022-01-27 | End: 2022-02-07 | Stop reason: HOSPADM

## 2022-01-27 RX ORDER — POLYETHYLENE GLYCOL 3350 17 G/17G
17 POWDER, FOR SOLUTION ORAL DAILY PRN
Status: DISCONTINUED | OUTPATIENT
Start: 2022-01-27 | End: 2022-02-07 | Stop reason: HOSPADM

## 2022-01-27 RX ORDER — GABAPENTIN 600 MG/1
600 TABLET ORAL 2 TIMES DAILY
Status: DISCONTINUED | OUTPATIENT
Start: 2022-01-27 | End: 2022-02-07 | Stop reason: HOSPADM

## 2022-01-27 RX ORDER — ATORVASTATIN CALCIUM 40 MG/1
80 TABLET, FILM COATED ORAL NIGHTLY
Status: DISCONTINUED | OUTPATIENT
Start: 2022-01-27 | End: 2022-02-07 | Stop reason: HOSPADM

## 2022-01-27 RX ORDER — SODIUM CHLORIDE 9 MG/ML
25 INJECTION, SOLUTION INTRAVENOUS PRN
Status: DISCONTINUED | OUTPATIENT
Start: 2022-01-27 | End: 2022-02-07 | Stop reason: HOSPADM

## 2022-01-27 RX ORDER — SODIUM CHLORIDE 0.9 % (FLUSH) 0.9 %
5-40 SYRINGE (ML) INJECTION PRN
Status: DISCONTINUED | OUTPATIENT
Start: 2022-01-27 | End: 2022-02-07 | Stop reason: HOSPADM

## 2022-01-27 RX ORDER — DOCUSATE SODIUM 100 MG/1
200 CAPSULE, LIQUID FILLED ORAL 2 TIMES DAILY
Status: CANCELLED | OUTPATIENT
Start: 2022-01-27

## 2022-01-27 RX ADMIN — GABAPENTIN 600 MG: 600 TABLET, FILM COATED ORAL at 09:00

## 2022-01-27 RX ADMIN — SODIUM CHLORIDE, PRESERVATIVE FREE 10 ML: 5 INJECTION INTRAVENOUS at 20:22

## 2022-01-27 RX ADMIN — GABAPENTIN 600 MG: 600 TABLET, FILM COATED ORAL at 20:17

## 2022-01-27 RX ADMIN — ATORVASTATIN CALCIUM 80 MG: 40 TABLET, FILM COATED ORAL at 20:19

## 2022-01-27 RX ADMIN — CYANOCOBALAMIN TAB 1000 MCG 1000 MCG: 1000 TAB at 09:00

## 2022-01-27 RX ADMIN — SODIUM CHLORIDE, PRESERVATIVE FREE 10 ML: 5 INJECTION INTRAVENOUS at 09:01

## 2022-01-27 RX ADMIN — DOCUSATE SODIUM 200 MG: 100 CAPSULE, LIQUID FILLED ORAL at 20:19

## 2022-01-27 RX ADMIN — OSELTAMIVIR PHOSPHATE 75 MG: 75 CAPSULE ORAL at 16:04

## 2022-01-27 RX ADMIN — OSELTAMIVIR PHOSPHATE 30 MG: 30 CAPSULE ORAL at 20:16

## 2022-01-27 RX ADMIN — PANTOPRAZOLE SODIUM 40 MG: 40 TABLET, DELAYED RELEASE ORAL at 06:42

## 2022-01-27 RX ADMIN — ACETAMINOPHEN 650 MG: 325 TABLET ORAL at 20:18

## 2022-01-27 RX ADMIN — ENOXAPARIN SODIUM 40 MG: 100 INJECTION SUBCUTANEOUS at 09:00

## 2022-01-27 RX ADMIN — DOCUSATE SODIUM 200 MG: 100 CAPSULE, LIQUID FILLED ORAL at 09:00

## 2022-01-27 RX ADMIN — SODIUM CHLORIDE: 9 INJECTION, SOLUTION INTRAVENOUS at 01:39

## 2022-01-27 RX ADMIN — ASPIRIN 81 MG: 81 TABLET, CHEWABLE ORAL at 09:00

## 2022-01-27 RX ADMIN — Medication 10 MG: at 20:17

## 2022-01-27 RX ADMIN — IOPAMIDOL 80 ML: 755 INJECTION, SOLUTION INTRAVENOUS at 03:17

## 2022-01-27 ASSESSMENT — ENCOUNTER SYMPTOMS
ABDOMINAL PAIN: 0
BACK PAIN: 0
NAUSEA: 0
CONSTIPATION: 1
PHOTOPHOBIA: 0
CHEST TIGHTNESS: 0
EYE PAIN: 0
ABDOMINAL DISTENTION: 1
WHEEZING: 0
VOMITING: 0
SHORTNESS OF BREATH: 0
COUGH: 0
EYE DISCHARGE: 1
DIARRHEA: 0
APNEA: 0
EYE REDNESS: 0

## 2022-01-27 ASSESSMENT — PAIN SCALES - GENERAL
PAINLEVEL_OUTOF10: 8
PAINLEVEL_OUTOF10: 0
PAINLEVEL_OUTOF10: 0
PAINLEVEL_OUTOF10: 8
PAINLEVEL_OUTOF10: 0
PAINLEVEL_OUTOF10: 0
PAINLEVEL_OUTOF10: 6
PAINLEVEL_OUTOF10: 0
PAINLEVEL_OUTOF10: 0
PAINLEVEL_OUTOF10: 8

## 2022-01-27 ASSESSMENT — PAIN DESCRIPTION - DESCRIPTORS
DESCRIPTORS: ACHING
DESCRIPTORS: ACHING

## 2022-01-27 ASSESSMENT — PAIN DESCRIPTION - ONSET
ONSET: ON-GOING
ONSET: ON-GOING

## 2022-01-27 ASSESSMENT — PAIN - FUNCTIONAL ASSESSMENT: PAIN_FUNCTIONAL_ASSESSMENT: ACTIVITIES ARE NOT PREVENTED

## 2022-01-27 ASSESSMENT — PAIN DESCRIPTION - PROGRESSION: CLINICAL_PROGRESSION: NOT CHANGED

## 2022-01-27 ASSESSMENT — PAIN DESCRIPTION - PAIN TYPE
TYPE: CHRONIC PAIN
TYPE: CHRONIC PAIN

## 2022-01-27 ASSESSMENT — PAIN DESCRIPTION - LOCATION
LOCATION: FOOT
LOCATION: FOOT

## 2022-01-27 ASSESSMENT — PAIN DESCRIPTION - FREQUENCY
FREQUENCY: CONTINUOUS
FREQUENCY: CONTINUOUS

## 2022-01-27 ASSESSMENT — PAIN DESCRIPTION - ORIENTATION
ORIENTATION: RIGHT;LEFT
ORIENTATION: RIGHT;LEFT

## 2022-01-27 NOTE — PLAN OF CARE
Problem: Nutrition  Goal: Optimal nutrition therapy  Outcome: Ongoing  Note: Nutrition Problem #1: Inadequate oral intake  Intervention: Food and/or Nutrient Delivery: Continue Current Diet,Start Oral Nutrition Supplement  Nutritional Goals: pt will consistently consume >50% PO intake of meals and ONS through adm

## 2022-01-27 NOTE — CONSULTS
Pulmonary Consult Note      Reason for Consult: Mass like consolidation in the left lung base  Requesting Physician: Dr. Shira Curry  Subjective:   No acute events overnight. Patient seen and examined at been side. Patient's chart and notes were reviewed. Currently on ORA SpO2 >95%. Patient reports no SOB  States he has dec appetite in the past 2 weeks, he states he may have lost weight recently but cannot quantify how muchas Walter denies any fevers, chills, night sweats  Patient denies chest pain, chills, nausea, vomiting He denies urinary frequency, dysuria. Patient is hemodynamically stable and afebrile. CHIEF COMPLAINT / HPI:                Jack Arango is a [de-identified] y.o. male with PMH as below notable for carotid stenosis, significant for CAD s/p CABG. HFpEF (EF 55-60%), HTN who presented to the ED after a fall at home. He lives alone by himself and is wheelchair bound. Found down for an unknown period of time by his daughter who called EMS. Pt reports he fell as he was transferring himself to the wheelchair. In the ED he was found to be intermittently confused and was hypothermic to 90.4 HR 59, /75, sats 100% ORA. No leukocytosis. VBG 7.31 CO2 56. He was  Put on a Omaira hugger. COVID negative and influenza B positive. EPS was consulted    On admission CT abdomen and pelvis done because of concern for maligancy that showed mass-like consolidation of LL base 2/2 PNA vs underlying neoplasm with nearby pleural fluid concerning for empyema. Of note the patient was recently hospitalized 1/17 when he presented with complaint of weakness and fatigue noted to be bradycardic and later became hypothermic. Sepsis work-up was negative. Chronic Iglesias was replaced. Pt was seen by cardiology and supposed to get a 2 week CAM monitor for bradycardia prior to d/c but was unable to be placed    Pulmonology service was consulted for further workup and management of mass like consolidation of left lung base.     Pt seen at Daily    oseltamivir  75 mg Oral Once    oseltamivir  30 mg Oral BID     Allergies: Allergies   Allergen Reactions    Bactrim [Sulfamethoxazole-Trimethoprim] Rash     Social History:    TOBACCO:   reports that he quit smoking about 52 years ago. He has never used smokeless tobacco.  ETOH:   reports no history of alcohol use. Family History:       Problem Relation Age of Onset    Heart Disease Father        Review of Systems  A 10 point review of systems was conducted, significant findings as noted in HPI. Objective:   PHYSICAL EXAM:      VITALS:  BP (!) 100/58   Pulse 65   Temp 98.8 °F (37.1 °C) (Axillary)   Resp 18   Ht 5' 11\" (1.803 m)   Wt 205 lb 4 oz (93.1 kg)   SpO2 98%   BMI 28.63 kg/m²   24HR INTAKE/OUTPUT:      Intake/Output Summary (Last 24 hours) at 2022 1445  Last data filed at 2022 1153  Gross per 24 hour   Intake 380 ml   Output 0 ml   Net 380 ml     CURRENT PULSE OXIMETRY:  SpO2: 98 %  24HR PULSE OXIMETRY RANGE:  SpO2  Av.1 %  Min: 92 %  Max: 100 % on room air    Physical Exam  HENT:      Head: Normocephalic and atraumatic. Nose: Nose normal.      Mouth/Throat:      Mouth: Mucous membranes are dry. Pharynx: Oropharynx is clear. Cardiovascular:      Rate and Rhythm: Normal rate and regular rhythm. Pulses: Normal pulses. Pulmonary:      Effort: Pulmonary effort is normal.      Breath sounds: Normal breath sounds. Abdominal:      General: Abdomen is flat. Musculoskeletal:         General: Normal range of motion. Skin:     General: Skin is warm and dry. Neurological:      Mental Status: He is alert.          DATA:    Old records have been reviewed  CBC with Differential:    Lab Results   Component Value Date    WBC 4.8 2022    RBC 3.02 2022    HGB 8.6 2022    HCT 26.9 2022     2022    MCV 88.8 2022    MCH 28.6 2022    MCHC 32.1 2022    RDW 17.6 2022    BANDSPCT 2 2019    LYMPHOPCT 12.3 01/27/2022    MONOPCT 4.4 01/27/2022    MYELOPCT 1 01/03/2019    BASOPCT 0.7 01/27/2022    MONOSABS 0.2 01/27/2022    LYMPHSABS 0.6 01/27/2022    EOSABS 0.1 01/27/2022    BASOSABS 0.0 01/27/2022     BMP:    Lab Results   Component Value Date     01/26/2022    K 4.7 01/26/2022     01/26/2022    CO2 28 01/26/2022    BUN 44 01/26/2022    CREATININE 1.3 01/26/2022    CALCIUM 9.3 01/26/2022    GFRAA >60 01/26/2022    LABGLOM 53 01/26/2022    GLUCOSE 91 01/26/2022     Hepatic Function Panel:    Lab Results   Component Value Date    ALKPHOS 168 01/26/2022    ALT 30 01/26/2022    AST 30 01/26/2022    PROT 7.8 01/26/2022    BILITOT 0.4 01/26/2022    BILIDIR <0.2 06/20/2021    IBILI see below 06/20/2021     ABG:    Lab Results   Component Value Date    REX8FGT 24 02/07/2020    BEART -1.0 02/07/2020    O4PJYMTP 98 02/07/2020    PHART 7.352 02/07/2020    ABK9GBY 44.4 02/07/2020    PO2ART 95.3 02/07/2020    PYA4JBS 25 02/07/2020       Cultures:   Blood Culture:    Sputum Culture:        Radiology Review:  All pertinent images / reports were reviewed as a part of this visit. Imaging reveals the following:  CT CHEST ABDOMEN PELVIS W CONTRAST   Final Result     Findings likely consistent with stercoral proctitis. Cystitis    also suspected.       _______________________________________________       IMPRESSION:          Masslike consolidation of the left lung base which could be    secondary to a pneumonia though underlying neoplasm considered. Nearby pleural fluid collection concerning for an empyema. XR CHEST PORTABLE   Final Result      1. Decreased bilateral interstitial opacities which may represent improved interstitial edema. 2.  Mild cardiomegaly. 3.  Chronic left basilar pleural and parenchymal opacities, unchanged         XR TIBIA FIBULA RIGHT (2 VIEWS)   Final Result      Pelvis and right hip:   1. Diffuse demineralization limits evaluation. No evidence of acute fracture.    2.  Status post right femur ORIF without evidence of hardware complication. Right tib-fib:   1. No acute osseous findings. XR HIP 2-3 VW W PELVIS RIGHT   Final Result      Pelvis and right hip:   1. Diffuse demineralization limits evaluation. No evidence of acute fracture. 2.  Status post right femur ORIF without evidence of hardware complication. Right tib-fib:   1. No acute osseous findings. CT Head WO Contrast   Final Result      Head   1. No evidence acute intracranial hemorrhage or mass effect. 2.  Mild to moderate atrophy. Cervical spine   1. No evidence of acute fracture or malalignment. 2.  Moderate multilevel cervical spondylosis. CT Cervical Spine WO Contrast   Final Result      Head   1. No evidence acute intracranial hemorrhage or mass effect. 2.  Mild to moderate atrophy. Cervical spine   1. No evidence of acute fracture or malalignment. 2.  Moderate multilevel cervical spondylosis. XR HIP 2-3 VW W PELVIS RIGHT    (Results Pending)        Assessment/Plan   [de-identified] y.o. male with     Mass like consolidation of the of left lung base 2/2 to possible malignancy  CT AP with mass like consolidation of the left lung base which could be   secondary to a pneumonia though underlying neoplasm considered. Nearby pleural fluid collection concerning for an empyema. Pt has a 10 year smoking history from [id number]. He denies any chemical exposure. 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 PNA less likely pt without fever or leukocytosis procal 0.12, WNL  - will need to biopsy mass    Symptomatic bradycardia likely 2/2 hypothermia  - improved  - EP consulted, avoid AV remington agents    Hypothermia unknown etiology  -f/u TSH, cortisol    Influenza B positive  - continue on tamiflu    Thank you for the opportunity to participate in [address]. Plan and assessment discussed with attending, Dr. Carlos Diez Degree, MD, PGY1  1/27/2022  2:45 PM       Patient was seen, examined and discussed with Dr. Jamilah Tobin. I agree with the history of present illness, past medical/surgical histories, family history, social history, medication list and allergies as listed. The review of systems is as noted above. My physical exam confirms the findings listed  Chart was reviewed including labs, CXR, CT scan and medical records confirm the findings noted    Mass like consolidation on the LLL likely due to rounded atelectasis   It was present on prior CXR and prior CT abdomen in 2019 and 2017    No further pulmonary workup is required for this.

## 2022-01-27 NOTE — PLAN OF CARE
Pt A/O, denies SOB or dizziness, and VSS. Pt is on a kole hugger to keep body temp 97 to 99 F. NS infusing @ 100 ml/hr. Pt is resting with no other complaints at this time. Call light within reach.        Problem: Falls - Risk of:  Goal: Will remain free from falls  Description: Will remain free from falls  Outcome: Ongoing  Goal: Absence of physical injury  Description: Absence of physical injury  Outcome: Ongoing     Problem: Skin Integrity:  Goal: Will show no infection signs and symptoms  Description: Will show no infection signs and symptoms  Outcome: Ongoing  Goal: Absence of new skin breakdown  Description: Absence of new skin breakdown  Outcome: Ongoing     Problem: Pain:  Goal: Pain level will decrease  Description: Pain level will decrease  Outcome: Ongoing  Goal: Control of acute pain  Description: Control of acute pain  Outcome: Ongoing  Goal: Control of chronic pain  Description: Control of chronic pain  Outcome: Ongoing

## 2022-01-27 NOTE — CONSULTS
[facility]   Electrophysiology Nurse Practitioner  Consult Note    Date: 1/27/2022    Reason for Consultation/ Chief Complaint: SB, s/p fall    History Obtained From: Patient and medical record. Cardiac Hx: Kerri Moreno is a [de-identified] y.o. man with a h/o HTN, HLD, GERD, carotid stenosis, CAD, s/p CABG, MPI (2020), follows with Dr. Poli Dixon, 5 sec pause w/ LOC during MPI, LHC (2020) showed 4/5 patent grafts and flow limiting DEANNA to LAD with no opportunity for intervention, s/p CAM monitor (6/24/2021 - 7/9/2021) showed avg HR 60 (), NSR, SB, 6 AT episodes with the longest lasting 11 beats, 1 pause 2.5 secs in length, admitted 1/18/2021 with fatigue and generalized weakness, had fallen, found to be SB - HR 46, hypothermic with improvement of HR with Omaira richard, who p/w a fall, unable to get up, found to be bradycardic and hypothermic, + flu. HPI: Patient was admitted for a fall. States he could not get to his wheelchair and landed on the floor. He does no think he passed out. He thinks he laid there for about 45 minutes. He does admit to getting dizzy and lightheaded at home. Not able to tell me if he has passed out. States he is weak. + for the flu. NSR with SB on tele with HRs as low as the 50's - no pauses. Patient with recent hospitalization for a fall, SB and hypothermia. SB appeared to resolve with improvement in temp. Patient with a h/o SB and had a 5 sec pause during MPI in 2020. He was symptomatic w/ syncope that time. Outpatient monitor had shown only one 2.5 second pause. He was admitted 10 days ago for a fall with hypothermia and bradycardia. He was to wear and outpatient monitor however left before one was placed and it was mailed to his home. He can't tell me if he received it or not. Home medications:   No current facility-administered medications on file prior to encounter.      Current Outpatient Medications on File Prior to Encounter   Medication Sig Dispense Refill    guaiFENesin (MUCINEX) 600 MG extended release tablet Take 1 tablet by mouth 2 times daily as needed for Congestion 60 tablet 2    ferrous sulfate (IRON 325) 325 (65 Fe) MG tablet Take 1 tablet by mouth 2 times daily 180 tablet 1    docusate sodium (COLACE) 100 MG capsule Take 1 capsule by mouth daily as needed for Constipation 30 capsule 0    docusate sodium (COLACE) 100 MG capsule Take 100 mg by mouth daily as needed for Constipation      gabapentin (NEURONTIN) 600 MG tablet Take 600 mg by mouth 2 times daily.  aspirin 81 MG chewable tablet Take 1 tablet by mouth daily 30 tablet 3    pantoprazole (PROTONIX) 40 MG tablet Take 1 tablet by mouth every morning (before breakfast) 30 tablet 3    tamsulosin (FLOMAX) 0.4 MG capsule Take 0.8 mg by mouth daily       Melatonin 10 MG TABS Take 10 mg by mouth nightly as needed       atorvastatin (LIPITOR) 80 MG tablet Take 80 mg by mouth nightly.  albuterol sulfate HFA (PROVENTIL HFA) 108 (90 Base) MCG/ACT inhaler Inhale 2 puffs into the lungs every 6 hours as needed for Wheezing or Shortness of Breath 18 g 3    furosemide (LASIX) 40 MG tablet TAKE 1 TABLET TWICE DAILY 180 tablet 3    Balsam [place]-Castor Oil (VENELEX) OINT ointment Apply topically daily as needed      dicyclomine (BENTYL) 20 MG tablet Take 20 mg by mouth 3 times daily as needed      vitamin B-12 (CYANOCOBALAMIN) 1000 MCG tablet Take 1,000 mcg by mouth daily      zolpidem (AMBIEN) 10 MG tablet Take 10 mg by mouth nightly as needed for Sleep.           Scheduled Meds:   aspirin  81 mg Oral Daily    pantoprazole  40 mg Oral QAM AC    vitamin B-12  1,000 mcg Oral Daily    gabapentin  600 mg Oral BID    [Held by provider] atorvastatin  80 mg Oral Nightly    sodium chloride flush  5-40 mL IntraVENous 2 times per day    enoxaparin  40 mg SubCUTAneous Daily    docusate sodium  200 mg Oral BID    bisacodyl  10 mg Rectal Daily     Continuous Infusions:   sodium chloride      sodium chloride 100 mL/hr at 01/27/22 0139     PRN Meds:melatonin, dicyclomine, albuterol, guaiFENesin, sodium chloride flush, sodium chloride, ondansetron **OR** ondansetron, polyethylene glycol, acetaminophen **OR** acetaminophen       Past Medical History:   Diagnosis Date    BPH (benign prostatic hyperplasia)     Carotid stenosis 12/2013    JESU 00-05% stenosis; LICA 52-73% stenosis    Cellulitis 12/2013    LLE    CH[facility]est[facility] failure[facility]     Chronic back pain     Diverticular disease     GERD (gastroesophageal reflux disease)     History of atrial fibrillation     Hypercholesteremia     Hypertension     Lower GI bleed     MDRO (multiple drug resistant organisms) resistance 10/26/2019    urine    Neuromuscular disorder ([facility])     spasticity    Renal insufficiency     Vitamin B12 deficiency         Past Surgical History:   Procedure Laterality Date    BACK SURGERY  2006    lower lumbar    CORONARY ARTERY BYPASS GRAFT  12/13/2013    CABG x 5 (Dr Ashlee Espinoza), svg to diag, om1 and 3, distal rca, kelly to lad.  CYSTOSCOPY N/A 1/10/2019    CYSTOSCOPY performed by Meghana Bravo MD at [facility] [address] 5/1/2015    ORIF    LEG SURGERY Right 11/2/2021    RIGHT LOWER EXTREMITY ADVANCEMENT FLAP AND SPLIT THICKNESS SKIN GRAFT PLACEMENT; (WOUND- 10 CM X 5.5 CM; CLOSURE- 6 CM X 5.5 CM; SKIN GRAFT- 7.2 CM X 5 CM) performed by Edith Sharma MD at [address] 6/11/2020    [address] performed by Sterling Goldman MD at [address] 5/4/2020    EGD BIOPSY performed by Sterling Goldman MD at [address]   Allergen Reactions    Bactrim [Sulfamethoxazole-Trimethoprim] Rash       Social History:  Reviewed. reports that he quit smoking about 52 years ago. He has never used smokeless tobacco. He reports that he does not drink alcohol and does not use drugs.      Family History:  Reviewed. family history includes Heart Disease in his father. Review of System:    · Constitutional: No fevers, chills. · Eyes: No visual changes or diplopia. No scleral icterus. · ENT: No Headaches. No mouth sores or sore throat. · Cardiovascular: No for chest pain, No for dyspnea on exertion, No for palpitations or No for loss of consciousness. No cough, hemoptysis, No for pleuritic pain, or phlebitis. · Respiratory: No for cough or wheezing. No hematemesis. · Gastrointestinal: No abdominal pain, blood in stools. · Musculoskeletal: Yes gait disturbance,    · Integumentary: No rash or pruritis. · Neurological: No headache, change in muscle strength, numbness or tingling. · Psychiatric: No anxiety, or depression. Physical Examination:  Vitals:    22 0753   BP: (!) 121/59   Pulse: 67   Resp: 19   Temp: 97.5 °F (36.4 °C)   SpO2: 99%      No intake/output data recorded. Wt Readings from Last 3 Encounters:   22 205 lb 4 oz (93.1 kg)   22 207 lb 3.7 oz (94 kg)   21 185 lb (83.9 kg)     Temp  Av.7 °F (34.3 °C)  Min: 90.1 °F (32.3 °C)  Max: 97.5 °F (36.4 °C)  Pulse  Av.5  Min: 47  Max: 71  BP  Min: 109/63  Max: 134/87  SpO2  Av.5 %  Min: 92 %  Max: 100 %  No intake or output data in the 24 hours ending 22 0956    · Constitutional: Oriented. No distress. · Head: Normocephalic and atraumatic. · Mouth/Throat: Oropharynx is clear and moist.   · Eyes: Conjunctivae clear without jaunduice. PERRL. · Neck: Neck supple. No rigidity. No JVD present. · Cardiovascular: Normal rate, regular rhythm, S1&S2. · Pulmonary/Chest: Bilateral respiratory sounds. No wheezes, No rhonchi. · Abdominal: Soft. Bowel sounds present. No distension, No tenderness. · Musculoskeletal: No tenderness. No edema    · Lymphadenopathy: Has no cervical adenopathy. · Neurological: Alert and oriented.  Cranial nerve appears intact, No Gross deficit   · Skin: Skin is warm and dry. No rash noted. · Psychiatric: Has a normal mood, affect and behavior     Labs:  Reviewed. Recent Labs     01/26/22 2056   *   K 4.7      CO2 28   BUN 44*   CREATININE 1.3     Recent Labs     01/26/22 2056 01/27/22  0545   WBC 6.7 4.8   HGB 9.8* 8.6*   HCT 31.3* 26.9*   MCV 89.7 88.8    128*     Lab Results   Component Value Date    CKTOTAL 225 01/26/2022    TROPONINI 0.05 01/26/2022     No results found for: BNP  Lab Results   Component Value Date    PROTIME 11.0 10/31/2021    PROTIME 14.2 04/22/2021    PROTIME 14.1 02/14/2021    INR 0.97 10/31/2021    INR 1.22 04/22/2021    INR 1.21 02/14/2021     Lab Results   Component Value Date    CHOL 102 06/05/2021    HDL 43 06/05/2021    TRIG 54 06/05/2021       Telemetry: Personally reviewed: NSR, SB    Radiography: Personally reviewed:   1.  Decreased bilateral interstitial opacities which may represent improved interstitial edema. 2.  Mild cardiomegaly. 3.  Chronic left basilar pleural and parenchymal opacities, unchanged     ECG: Personally reviewed: SB, HR 51, QRS 82, QTc 447    ECHO:  1/14/2020  Summary   Normal left ventricle size. There is mild concentric left ventricular   hypertrophy. Overall left ventricular systolic function appears normal with   an ejection fraction of 55-60%. No regional wall motion abnormalities are   noted. Diastolic filling parameters suggests normal diastolic function. Trace mitral and tricuspid regurgitation is present. Estimated pulmonary artery systolic pressure is 30 mmHg assuming a right   atrial pressure of 3 mmHg. Biatrial enlargement.     Stress Test: 8/18/2020   Summary    Very small and mild distal anteroseptal reversible defect (apex is    preserved), cannot exclude ischemia vs artifact.    Normal LV size and systolic function.    Left ventricular ejection fraction of 68 %.    There is severe hypokinesis / akinesis of the septal wall.    Overall findings represent a intermediate risk scan. Dr Josep Baum and Guicho Espinoza NP have been notified. Cardiac Angiography:8/19/2020 Dr. Josep Baum  Left Main: Normal   Left Anterior Descending: Has mid vessel plaque and areas of stenosis up to 60%. Circumflex: Has subtotal occlusion in the midportion. There is a vein graft to the distal circumflex vessels.   Right Coronary: Is probably nondominant. Mid distal stenosis of 60 to 70%.   Left Ventriculogram: Normal contractility and size with ejection fraction of 55 to 60%. EDP was 12   Right ileofemoral: Normal vessel. Our insertion site was at the superficial femoral therefore no closure device was added.   Saphenous vein graft on the right was injected. It shows what appears to be quadruple skip graft. Is a large vessel with good flow to all the areas that it was attached to. We see distal right and we see distal circumflex and obtuse marginal x2   The DEANNA is a small atretic looking vessel. The anastomosis at the LAD with very limited and slow flow down the LAD. We do not see any opportunities for intervention. Hemodynamics:   Left Ventricular Pressure: 12  Central Aortic Pressure: 100     Assessment:  Patient with 5 vessel bypass in 4 of the grafts are in really great shape. The DEANNA is atretic into the LAD. We do not see any opportunities for intervention on this patient. The LV function is good with normal ejection fraction of 55 to 60%. Recommendations: We will continue his current therapy including anti-ischemic therapy. Optimize lipid management with LDL target of 60 or less. Blood pressure looks really good on current therapy. He is being evaluated by EP because of the bradycardia and pauses occurring in the stress lab which may have been related to the [facility]. I do not suspect that a pacemaker is necessary at this time as he has had good rhythm throughout the night. Probably outpatient monitoring would be appropriate for this patient.   We will await results and recommendation from electrophysiology.   Sravan Sandoval MD, [facility]     Problem List:   Patient Active Problem List    Diagnosis Date Noted    Symptomatic sinus bradycardia 01/27/2022    Bilateral leg edema 01/20/2022    Symptomatic bradycardia     Weakness 01/17/2022    Pain of right lower extremity     Degloving injury     Leg laceration, unspecified laterality, initial encounter 10/31/2021    Altered mental status     Hyperkalemia     Encephalopathy acute 04/21/2021    Acute renal failure superimposed on stage 3 chronic kidney disease ([address] 02/15/2021    Urinary tract infection associated with indwelling urethral catheter ([address] 02/15/2021    Acute cystitis with hematuria 02/14/2021    Abnormal angiogram 08/27/2020    H/O angiography 08/20/2020    Abnormal stress test 08/18/2020    Constipation 06/09/2020    Encopresis with constipation and overflow incontinence 06/09/2020    Cellulitis of scrotum 05/02/2020    Acute renal injury ([address] 03/17/2020    Pseudomonas infection 02/06/2020    Dyspnea     Bradycardia     CHF with unknown LVEF ([address] 01/13/2020    Gross hematuria 12/17/2019    Chronic indwelling Iglesias catheter 11/20/2019    Hyperlipidemia 11/20/2019    Bilateral lower leg cellulitis 11/20/2019    Neurogenic bladder 11/20/2019    Bacteriuria 11/20/2019    Abnormality of urethral meatus 11/20/2019    Acute encephalopathy 10/08/2019    Problem with urinary catheter ([address] 10/07/2019    Edema 31/95/8271    Complicated UTI (urinary tract infection) 01/07/2019    Hypothermia 01/03/2019    Acute low back pain without sciatica 05/26/2017    Hip fracture, right ([address] 05/01/2015    Acute right hip pain     Lower GI bleeding 11/10/2014    CAD (coronary artery disease) 01/27/2014    S/P CABG x 5 01/27/2014    Cellulitis 12/16/2013    Essential hypertension 12/16/2013    GERD (gastroesophageal reflux disease) 12/16/2013    Acute blood loss anemia 12/16/2013    Tinea corporis 12/16/2013    Carotid stenosis 12/16/2013    Hypotension 11/30/2013    Light headed 11/30/2013        Assessment:   1. Hypothermia, initial encounter    2. Fall from bed, initial encounter    3. SB    Cardiac Hx: Neftali Ortiz is a [de-identified] y.o. man with a h/o HTN, HLD, GERD, carotid stenosis, CAD, s/p CABG, MPI (2020), follows with Dr. Soni Phan, 5 sec pause w/ LOC during MPI, LHC (2020) showed 4/5 patent grafts and flow limiting DEANNA to LAD with no opportunity for intervention, s/p CAM monitor (6/24/2021 - 7/9/2021) showed avg HR 60 (), NSR, SB, 6 AT episodes with the longest lasting 11 beats, 1 pause 2.5 secs in length, admitted 1/18/2021 with fatigue and generalized weakness, had fallen, found to be SB - HR 46, hypothermic with improvement of HR with Omaira ramos, who p/w a fall, unable to get up, found to be bradycardic and hypothermic. SB  - Bradycardic to the 50's on tele  - Keep on tele   - Avoid AV remington agents  - Last monitor showed one pause 2.5 secs, 5 sec pause in the past on MPI, will discuss ILR when patient status improves - patient is flu positive  - Will review with Dr. Christiana Poe  - ECG ordered and results personally reviewed     No tobacco use. All questions and concerns were addressed to the patient/family. Alternatives to my treatment were discussed. The note was completed using EMR. Every effort was made to ensure accuracy; however, inadvertent computerized transcription errors may be present. Ciara Downing CNP  [address]      I  have spent 60 minutes in care of the patient including direct face to face time, chart preparation, reviewing diagnostic testing, other provider notes and coordinating patient care.

## 2022-01-27 NOTE — PROGRESS NOTES
Physical Therapy    Facility/Department: [place] [address] [facility]  Initial Assessment and treatment    NAME: Irma Hopkins  : 1940  MRN: [id number]    Date of Service: 2022    Discharge Recommendations:Tushar Block scored a  on the AM-PAC short mobility form. Current research shows that an AM-PAC score of 17 or less is typically not associated with a discharge to the patient's home setting. Based on the patient's AM-PAC score and their current functional mobility deficits, it is recommended that the patient have 3-5 sessions per week of Physical Therapy at d/c to increase the patient's independence. Please see assessment section for further patient specific details. If patient discharges prior to next session this note will serve as a discharge summary. Please see below for the latest assessment towards goals. PT Equipment Recommendations  Equipment Needed:  (defer)    Assessment   Body structures, Functions, Activity limitations: Decreased functional mobility   Assessment: The pt is an [de-identified] y/o male who presents with symptomatic sinus bradycardia after falling at home and unable to stand. Found by daughter within 45 minutes per pt. Pt with recent hospitalization due to inability to care for himself at home as well and found down. Pt recently unable to perform ADLs for himself such as dressing, bathing, toileting (within the past month). He had been recommended for SNF at discharge but unable 2/2 owing a SNF money. Pt again presents with very signficant weakness and requiring a heavy 2 person assist for mobility. He is not safe to return home. Recommend a higher level of care after SNF stay such as ECF. Pt would benefit from therapy at discharge to improve his independence with functional mobility. Treatment Diagnosis: impaired mobility 2/2 fall  Prognosis: Fair  Decision Making: Medium Complexity  PT Education: Goals;PT Role;Plan of Care; Functional Mobility Training;Orientation;General Safety;Transfer Training  Patient Education: pt will require reinforcement  Barriers to Learning: cognition  REQUIRES PT FOLLOW UP: Yes  Activity Tolerance  Activity Tolerance: Patient limited by fatigue;Patient limited by endurance; Patient limited by pain       Patient Diagnosis(es): The primary encounter diagnosis was Hypothermia, initial encounter. A diagnosis of Fall from bed, initial encounter was also pertinent to this visit. has a past medical history of BPH (benign prostatic hyperplasia), Carotid stenosis, Cellulitis, CHF (congestive heart failure) ([facility] Chronic back pain, Diverticular disease, GERD (gastroesophageal reflux disease), History of atrial fibrillation, Hypercholesteremia, Hypertension, Lower GI bleed, MDRO (multiple drug resistant organisms) resistance, Neuromuscular disorder ([facility] Renal insufficiency, and Vitamin B12 deficiency. 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 Restrictions  Position Activity Restriction  Other position/activity restrictions: up with assist  Vision/Hearing  Vision: Impaired  Hearing: Within functional limits     Subjective  General  Chart Reviewed: Yes  Patient assessed for rehabilitation services?: Yes  Additional Pertinent Hx: [de-identified] yo wheel chair bound male comes to the hospital after a fall. Pt with symptomatic bradycardia possibly caused by hypothermia. Recent hospitalization where SNF recommended but pt owes money to SNF and was unable to be placed. Family / Caregiver Present: No  Referring Practitioner: William Rodriguez MD  Diagnosis: symptomatic sinus bradycardia  Follows Commands: Within Functional Limits  General Comment  Comments:  The pt presents supine in bed and willing to work with therapy with encouragement. Subjective  Subjective: \"I guess I can get up to the chair. \"  Pain Screening  Patient Currently in Pain: Yes (some sores on his bottom that he notes are painful)  Vital Signs  Patient Currently in Pain: Yes (some sores on his bottom that he notes are painful)       Orientation  Orientation  Overall Orientation Status: Within Functional Limits (confusion with conversation)  Social/Functional History  Social/Functional History  Lives With: Alone  Type of Home: House  Home Layout: One level  Home Access: Ramped entrance,Level entry  [employer]:  (uses commode)  Bathroom Equipment: 3-in-1 commode  Home Equipment: [employer] Help From:  (daughter checks on him twice a week)  ADL Assistance: Needs assistance (pt reports indep with sponge bathing and dressing previously 12/29. Since then pt has not washed and with minimal self dressing)  Homemaking Assistance: Needs assistance (needs assistance (dtr completes cleaning and groceries, pt claims homeaid is supposed to be coming, unclear how often \"everyday\" but the says 2 days a week))  Homemaking Responsibilities: No  Ambulation Assistance: Independent (with w/c per pt)  Transfer Assistance: Independent (pt reports he had been independent with squat pivot from lift chair to w/c. Likely has transferred minimally and with difficulty since last few hospital stays; unable to d/c to SNF due to owing money)  Active : No  Patient's  Info: Transportation service  Additional Comments: pt found down after being previously admitted (discharged home 6 days before return) has had difficulty with mobility, dressing, all ADLs and not able to manage at home for the past month  Cognition   Cognition  Overall Cognitive Status: Exceptions  Arousal/Alertness: Delayed responses to stimuli  Following Commands:  Follows one step commands with repetition  Attention Span: Attends with cues to redirect  Memory: Decreased short term memory;Decreased recall of recent events  Safety Judgement: Decreased awareness of need for assistance;Decreased awareness of need for safety  Problem Solving: Assistance required to implement solutions;Assistance required to identify errors made;Assistance required to generate solutions  Initiation: Requires cues for all  Sequencing: Requires cues for all    Objective   Strength RLE  Strength RLE: Exception  Comment: very weak with mobility ~2-/5  Strength LLE  Strength LLE: Exception  Comment: very weak with mobility ~2-/5        Bed mobility  Supine to Sit: 2 Person assistance (dependent x 2)  Sit to Supine: Unable to assess  Comment: pt left up in chair  Transfers  Sit to Stand: 2 Person Assistance (max A x 2 without achieving full upright stand; still forward flexed)  Stand to sit: 2 Person Assistance (max A x 2)  Stand Pivot Transfers: Dependent/Total (with use of stedy)  Ambulation  Ambulation?: No     Balance  Sitting - Static: Poor;+  Sitting - Dynamic: Poor;-  Standing - Static: Poor;-  Comments: Pt min A- max A sitting balance with posterior lean, max A x 2 for standing balance    Treatment:  Functional mobility training and pt education    Plan   Plan  Times per week: 2-5  Times per day: Daily  Current Treatment Recommendations: Strengthening,Functional Mobility Training,Transfer Training,Balance Training  Safety Devices  Type of devices: Call light within reach,Chair alarm in place,Left in chair,Nurse notified    AM-PAC Score  -PAC Inpatient Mobility Raw Score : 6 (01/27/22 1135)  AM-PAC Inpatient T-Scale Score : 23.55 (01/27/22 1135)  Mobility Inpatient CMS 0-100% Score: 100 (01/27/22 1135)  Mobility Inpatient CMS G-Code Modifier : CN (01/27/22 1135)          Goals  Short term goals  Time Frame for Short term goals: By discharge  Short term goal 1: The pt will perform bed mobility with mod A x 1  Short term goal 2: The pt will transfer with mod A for sit to stand  Short term goal 3: The pt will be able to perform a

## 2022-01-27 NOTE — PROGRESS NOTES
Occupational Therapy   Occupational Therapy Initial Assessment/Treatment  Date: 2022   Patient Name: Kerri Moreno  MRN: [id number]     : 1940    Date of Service: 2022    Discharge Recommendations:    Kerri Moreno scored a 14/24 on the AM-PAC ADL Inpatient form. Current research shows that an AM-PAC score of 17 or less is typically not associated with a discharge to the patient's home setting. Based on the patient's AM-PAC score and their current ADL deficits, it is recommended that the patient have 3-5 sessions per week of Occupational Therapy at d/c to increase the patient's independence. Please see assessment section for further patient specific details. If patient discharges prior to next session this note will serve as a discharge summary. Please see below for the latest assessment towards goals. OT Equipment Recommendations  Equipment Needed: No  Other: Defer to next level of care    Assessment   Performance deficits / Impairments: Decreased functional mobility ; Decreased ADL status; Decreased endurance;Decreased balance;Decreased strength;Decreased cognition  Assessment: Pt presents with a decline in functional independence. Pt req max assist of 2 for mobility and up to max assist for ADL. Safety is a concern and feel pt would benefit from further OT services. Treatment Diagnosis: Impaired ADL, functional mobility  Prognosis: Fair  Decision Making: Medium Complexity  OT Education: OT Role;Plan of Care;Transfer Training  Patient Education: Needs reinforcement  REQUIRES OT FOLLOW UP: Yes  Safety Devices  Safety Devices in place: Yes  Type of devices: Left in chair;Call light within reach; Chair alarm in place;Nurse notified (Meal tray in place)         Treatment Diagnosis: Impaired ADL, functional mobility      Restrictions  Position Activity Restriction  Other position/activity restrictions: up with assist    Subjective   General  Chart Reviewed: Yes  Patient assessed for rehabilitation services?: Yes  Additional Pertinent Hx: [de-identified] yo wheel chair bound male comes to the hospital after a fall. Pt with symptomatic bradycardia possibly caused by hypothermia. Recent hospitalization where SNF recommended but pt owes money to SNF and was unable to be placed. PMH includes: carotid stenosis, CAD, CABG, heart failure, hypertension  Family / Caregiver Present: No  Referring Practitioner: Dr. Jennifer Shultz  Diagnosis: Symptomatic sinus bradycardia  Subjective  Subjective: Pt in bed upon entry. Pt agreeable to OOB activity. Pain: Yes (some sores on his bottom that he notes are painful)Nurse aware  Social/Functional History  Social/Functional History  Lives With: Alone  Type of Home: House  Home Layout: One level  Home Access: Ramped entrance,Level entry  [employer]:  (uses commode)  Bathroom Equipment: 3-in-1 commode  Home Equipment: Casey Splinter Help From:  (daughter checks on him twice a week)  ADL Assistance: Needs assistance (pt reports indep with sponge bathing and dressing previously 12/29. Since then pt has not washed and with minimal self dressing)  Homemaking Assistance: Needs assistance (needs assistance (dtr completes cleaning and groceries, pt claims homeaid is supposed to be coming, unclear how often \"everyday\" but the says 2 days a week))  Homemaking Responsibilities: No  Ambulation Assistance: Independent (with w/c per pt)  Transfer Assistance: Independent (pt reports he had been independent with squat pivot from lift chair to w/c.  Likely has transferred minimally and with difficulty since last few hospital stays; unable to d/c to SNF due to owing money)  Active : No  Patient's  Info: Transportation service  Additional Comments: pt found down after being previously admitted (discharged home 6 days before return) has had difficulty with mobility, dressing, all ADLs and not able to manage at home for the past month       Objective   Vision: Impaired  Hearing: Within functional limits    Orientation  Overall Orientation Status: Within Functional Limits     Balance  Sitting Balance: Maximum assistance (to min assist sittinf edge of bed)  Standing Balance: Dependent/Total (max assist x2)  ADL  Feeding: Setup  Grooming: Setup;Verbal cueing; Moderate assistance (decreased thoroughness for wiping face and hands)  LE Dressing: Independent  Toileting:  (catheter)  Tone RUE  RUE Tone: Normotonic  Tone LUE  LUE Tone: Normotonic  Coordination  Movements Are Fluid And Coordinated: Yes     Bed mobility  Supine to Sit: 2 Person assistance (dependent x 2)  Sit to Supine: Unable to assess  Transfers  Sit to stand: 2 Person assistance (max assist x2)  Stand to sit: 2 Person assistance (max assist x2)  Transfer Comments: Transferred bed to chair via [place] with max assist x2     Cognition  Overall Cognitive Status: Exceptions  Arousal/Alertness: Delayed responses to stimuli  Following Commands: Follows one step commands with repetition  Attention Span: Attends with cues to redirect  Memory: Decreased short term memory;Decreased recall of recent events  Safety Judgement: Decreased awareness of need for assistance;Decreased awareness of need for safety  Problem Solving: Assistance required to implement solutions;Assistance required to identify errors made;Assistance required to generate solutions  Initiation: Requires cues for all  Sequencing: Requires cues for all                 LUE AROM (degrees)  LUE AROM : WFL  RUE AROM (degrees)  RUE AROM : WFL        Hand Dominance  Hand Dominance: Right     Treatment included ADL and transfer training.         Plan   Plan  Times per week: 2-5  Current Treatment Recommendations: Strengthening,ROM,Balance Training,Functional Mobility Training,Endurance Training,Self-Care / ADL      AM-PAC Score        AM-[facility] Inpatient Daily Activity Raw Score: 14 (01/27/22 1552)  AM-PAC Inpatient ADL T-Scale Score : 33.39 (01/27/22 1552)  ADL Inpatient CMS 0-100% Score: 59.67 (01/27/22 1552)  ADL Inpatient CMS G-Code Modifier : CK (01/27/22 1552)    Goals                     No goals met  Short term goals  Time Frame for Short term goals: Discharge  Short term goal 1: Transfer to/from 3 in 1 commode with mod assist of2  Short term goal 2: Stance with mod assist of for 30 sec x3 to assist with ADL and transfers  Patient Goals   Patient goals : Get stronger       Therapy Time   Individual Concurrent Group Co-treatment   Time In 1104         Time Out 1151         Minutes 47         Timed Code Treatment Minutes: [address], OTR/L [id number]

## 2022-01-27 NOTE — ED PROVIDER NOTES
[address] ENCOUNTER          EM RESIDENT NOTE       Date of evaluation: 1/26/2022    Chief Complaint     Fall    History of Present Illness     Ronaldo Wilcox is a [de-identified] y.o. male HTN, HLD, CHF, renal insufficiency, atrial fibrillation, recently discharged who presents via EMS after being found down on his floor. According to EMS the patient's house was in [address] The patient states that he was on the floor for an unknown amount of time. He was attempting to transfer from his lift chair to his wheelchair when it slipped and he fell on the floor hitting his bottom. He denies hitting his head or any loss of consciousness. He is unclear how long he was down. He denies any chest pain, shortness of breath, pain, headache. He does endorse pain in his bilateral lower extremity from neuropathy. On arrival to the ED, the patient comes with a chronic indwelling Iglesias catheter. Review of Systems     Review of Systems   Constitutional: Negative for chills and fever. HENT: Negative. Eyes: Negative. Respiratory: Negative for cough, chest tightness and shortness of breath. Cardiovascular: Positive for leg swelling. Negative for chest pain. Gastrointestinal: Negative for abdominal pain, constipation, diarrhea, nausea and vomiting. Endocrine: Negative. Genitourinary: Negative for dysuria, frequency and urgency. Musculoskeletal: Negative for neck pain. Neurological: Negative for dizziness, syncope, facial asymmetry, speech difficulty, weakness, light-headedness, numbness and headaches. Hematological: Negative. Psychiatric/Behavioral: Negative.         Past Medical, Surgical, Family, and Social History     He has a past medical history of BPH (benign prostatic hyperplasia), Carotid stenosis, Cellulitis, CHF (congestive heart failure) ([facility] [address] Chronic back pain, Diverticular disease, GERD (gastroesophageal reflux disease), History of atrial fibrillation, Hypercholesteremia, Hypertension, Lower GI bleed, MDRO (multiple drug resistant organisms) resistance, Neuromuscular disorder ([address] Renal insufficiency, and Vitamin B12 deficiency. 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 His family history includes Heart Disease in his father. He reports that he quit smoking about 52 years ago. He has never used smokeless tobacco. He reports that he does not drink alcohol and does not use drugs. Medications     Previous Medications    ALBUTEROL SULFATE HFA (PROVENTIL HFA) 108 (90 BASE) MCG/ACT INHALER    Inhale 2 puffs into the lungs every 6 hours as needed for Wheezing or Shortness of Breath    ASPIRIN 81 MG CHEWABLE TABLET    Take 1 tablet by mouth daily    ATORVASTATIN (LIPITOR) 80 MG TABLET    Take 80 mg by mouth nightly. BALSAM PERU-CASTOR OIL (VENELEX) OINT OINTMENT    Apply topically daily as needed    DICYCLOMINE (BENTYL) 20 MG TABLET    Take 20 mg by mouth 3 times daily as needed    DOCUSATE SODIUM (COLACE) 100 MG CAPSULE    Take 100 mg by mouth daily as needed for Constipation    DOCUSATE SODIUM (COLACE) 100 MG CAPSULE    Take 1 capsule by mouth daily as needed for Constipation    FERROUS SULFATE (IRON 325) 325 (65 FE) MG TABLET    Take 1 tablet by mouth 2 times daily    FUROSEMIDE (LASIX) 40 MG TABLET    TAKE 1 TABLET TWICE DAILY    GABAPENTIN (NEURONTIN) 600 MG TABLET    Take 600 mg by mouth 2 times daily.     GUAIFENESIN (MUCINEX) 600 MG EXTENDED RELEASE TABLET    Take 1 tablet by mouth 2 times daily as needed for Congestion    MELATONIN 10 MG TABS    Take 10 mg by mouth nightly as needed     PANTOPRAZOLE (PROTONIX) 40 MG TABLET    Take 1 tablet by mouth every morning (before breakfast)    TAMSULOSIN (FLOMAX) 0.4 MG CAPSULE    Take 0.8 mg by mouth daily     VITAMIN B-12 (CYANOCOBALAMIN) 1000 MCG TABLET    Take 1,000 mcg by mouth daily    ZOLPIDEM (AMBIEN) 10 MG TABLET    Take 10 mg by mouth nightly as needed for Sleep. Allergies     He is allergic to bactrim [sulfamethoxazole-trimethoprim]. Physical Exam     INITIAL VITALS: BP: 118/73, Temp: (!) 90.4 °F (32.4 °C), Pulse: 61, Resp: 16, SpO2: 97 %   Physical Exam  Constitutional:       General: He is not in acute distress. Appearance: Normal appearance. He is ill-appearing. He is not toxic-appearing. HENT:      Head: Normocephalic and atraumatic. Mouth/Throat:      Mouth: Mucous membranes are dry. Eyes:      Extraocular Movements: Extraocular movements intact. Pupils: Pupils are equal, round, and reactive to light. Cardiovascular:      Rate and Rhythm: Normal rate and regular rhythm. Pulses: Normal pulses. Heart sounds: Normal heart sounds. No murmur heard. No friction rub. No gallop. Pulmonary:      Effort: Pulmonary effort is normal. No respiratory distress. Breath sounds: Normal breath sounds. No wheezing or rales. Abdominal:      General: Abdomen is flat. There is no distension. Palpations: Abdomen is soft. Tenderness: There is no abdominal tenderness. Genitourinary:     Penis: Normal.       Testes: Normal.   Musculoskeletal:         General: Normal range of motion. Cervical back: Normal range of motion. No rigidity or tenderness. Skin:     Capillary Refill: Capillary refill takes 2 to 3 seconds. Comments: The patient has a stage I pressure ulcer in his sacral decubitus area. The patient also has chronic leg wounds, worse on the right lower extremity. Neurological:      Mental Status: He is alert and oriented to person, place, and time. Mental status is at baseline. Cranial Nerves: No cranial nerve deficit.    Psychiatric:         Behavior: Behavior normal. DiagnosticResults     EKG   Interpreted in conjunction with emergencydepartment physician Caleb Wall MD  Rhythm: normal sinus   Rate: 50-60  Axis: normal  Ectopy: none  Conduction: normal  ST Segments: no acute change  T Waves:no acute change  Q Waves: none  Clinical Impression: no acute changes  Comparison: Stable compared to prior 1/17/2022    RADIOLOGY:  XR CHEST PORTABLE   Final Result      1. Decreased bilateral interstitial opacities which may represent improved interstitial edema. 2.  Mild cardiomegaly. 3.  Chronic left basilar pleural and parenchymal opacities, unchanged         XR TIBIA FIBULA RIGHT (2 VIEWS)   Final Result      Pelvis and right hip:   1. Diffuse demineralization limits evaluation. No evidence of acute fracture. 2.  Status post right femur ORIF without evidence of hardware complication. Right tib-fib:   1. No acute osseous findings. XR HIP 2-3 VW W PELVIS RIGHT   Final Result      Pelvis and right hip:   1. Diffuse demineralization limits evaluation. No evidence of acute fracture. 2.  Status post right femur ORIF without evidence of hardware complication. Right tib-fib:   1. No acute osseous findings. CT Head WO Contrast   Final Result      Head   1. No evidence acute intracranial hemorrhage or mass effect. 2.  Mild to moderate atrophy. Cervical spine   1. No evidence of acute fracture or malalignment. 2.  Moderate multilevel cervical spondylosis. CT Cervical Spine WO Contrast   Final Result      Head   1. No evidence acute intracranial hemorrhage or mass effect. 2.  Mild to moderate atrophy. Cervical spine   1. No evidence of acute fracture or malalignment. 2.  Moderate multilevel cervical spondylosis.          XR HIP 2-3 VW W PELVIS RIGHT    (Results Pending)       LABS:   Results for orders placed or performed during the hospital encounter of 01/26/22   Blood Gas, Venous   Result Value Ref Range    pH, Nick 7.318 (L) 7.350 - 7.450    pCO2, Nick 56.1 (H) 41.0 - 51.0 mmHg    pO2, Nick 55.4 (H) 25.0 - 40.0 mmHg    HCO3, Venous 28.8 (H) 24.0 - 28.0 mmol/L    Base Excess, Nick 1.8 -2.0 - 3.0 mmol/L    O2 Sat, Nick 84 Not established %    Carboxyhemoglobin 1.1 0.0 - 1.5 %    MetHgb, Nick 0.4 0.0 - 1.5 %    TC02 (Calc), Nick 31 mmol/L    Hemoglobin, Nick, Reduced 15.50 %   Lactic Acid, Plasma   Result Value Ref Range    Lactic Acid 1.1 0.4 - 2.0 mmol/L   CBC Auto Differential   Result Value Ref Range    WBC 6.7 4.0 - 11.0 K/uL    RBC 3.50 (L) 4.20 - 5.90 M/uL    Hemoglobin 9.8 (L) 13.5 - 17.5 g/dL    Hematocrit 31.3 (L) 40.5 - 52.5 %    MCV 89.7 80.0 - 100.0 fL    MCH 28.0 26.0 - 34.0 pg    MCHC 31.2 31.0 - 36.0 g/dL    RDW 17.3 (H) 12.4 - 15.4 %    Platelets 855 172 - 335 K/uL    MPV 9.2 5.0 - 10.5 fL    Neutrophils % 83.9 %    Lymphocytes % 9.6 %    Monocytes % 4.3 %    Eosinophils % 1.8 %    Basophils % 0.4 %    Neutrophils Absolute 5.7 1.7 - 7.7 K/uL    Lymphocytes Absolute 0.7 (L) 1.0 - 5.1 K/uL    Monocytes Absolute 0.3 0.0 - 1.3 K/uL    Eosinophils Absolute 0.1 0.0 - 0.6 K/uL    Basophils Absolute 0.0 0.0 - 0.2 K/uL   Troponin   Result Value Ref Range    Troponin 0.06 (H) <0.01 ng/mL   Brain Natriuretic Peptide   Result Value Ref Range    Pro- 0 - 449 pg/mL   Urinalysis Reflex to Culture    Specimen: Urine, clean catch   Result Value Ref Range    Color, UA Yellow Straw/Yellow    Clarity, UA Clear Clear    Glucose, Ur Negative Negative mg/dL    Bilirubin Urine Negative Negative    Ketones, Urine Negative Negative mg/dL    Specific Gravity, UA >=1.030 1.005 - 1.030    Blood, Urine TRACE-INTACT (A) Negative    pH, UA 6.0 5.0 - 8.0    Protein, UA 30 (A) Negative mg/dL    Urobilinogen, Urine 0.2 <2.0 E.U./dL    Nitrite, Urine Negative Negative    Leukocyte Esterase, Urine TRACE (A) Negative    Microscopic Examination YES     Urine Type Catheter    CK   Result Value Ref Range    Total  39 - 308 U/L   Comprehensive Metabolic Panel w/ Reflex to MG   Result Value Ref Range    Sodium 146 (H) 136 - 145 mmol/L    Potassium reflex Magnesium 4.7 3.5 - 5.1 mmol/L    Chloride 108 99 - 110 mmol/L    CO2 28 21 - 32 mmol/L    Anion Gap 10 3 - 16    Glucose 91 70 - 99 mg/dL    BUN 44 (H) 7 - 20 mg/dL    CREATININE 1.3 0.8 - 1.3 mg/dL    GFR Non- 53 (A) >60    GFR African American >60 >60    Calcium 9.3 8.3 - 10.6 mg/dL    Total Protein 7.8 6.4 - 8.2 g/dL    Albumin 4.0 3.4 - 5.0 g/dL    Albumin/Globulin Ratio 1.1 1.1 - 2.2    Total Bilirubin 0.4 0.0 - 1.0 mg/dL    Alkaline Phosphatase 168 (H) 40 - 129 U/L    ALT 30 10 - 40 U/L    AST 30 15 - 37 U/L   Troponin (lab)   Result Value Ref Range    Troponin 0.05 (H) <0.01 ng/mL       ED BEDSIDE ULTRASOUND:  None    RECENT VITALS:  BP: 126/62, Temp: (!) 90.3 °F (32.4 °C), Pulse: 66,Resp: 22, SpO2: 98 %     Procedures     None    ED Course     Nursing Notes, Past Medical Hx, Past Surgical Hx, Social Hx, Allergies, and Family Hx were reviewed. The patient was given the followingmedications:  Orders Placed This Encounter   Medications    lactated ringers bolus       CONSULTS:  Zackery Carmichael / ASSESSMENT / Spring Maggie is a [de-identified] y.o. male who was found down in his home for an unknown amount of time by EMS, found to be hypothermic on presentation to the emergency department. On arrival to the ED, the patient was chronically ill-appearing, nontoxic, normal blood pressure, bradycardic with a core temperature of 90.4 °F.  The patient was immediately placed on a Omaira hugger. He was also given a liter of IV fluids. CBC revealed no leukocytosis. The patient had a normocytic anemia with a hemoglobin of 9.8 and hematocrit of 31.3, stable when compared to prior. CMP revealed hyponatremia with a sodium of 146, normal potassium, slightly elevated BUN of 44 with normal creatinine of 1.3, normal anion gap. The patient's CK was 225. Lactate was 1.1. The patient had a respiratory acidosis with a pH of 7.31 CO2 of 56. Patient initial troponin was 0.06, repeat was 0.05, there was no concern for acute ischemia on the patient's EKG. The patient's BNP was 387. Urinalysis revealed trace blood, trace leukocytes, normal WBCs. On reassessment, the patient had persistent hypothermia and was still bradycardic. At this time, a TSH and free T4 were sent. Blood cultures are pending. Review of the patient's chest x-ray showed decreased bilateral interstitial opacities, mild cardiomegaly, and left basilar pleural effusion and parenchymal opacities that were unchanged from prior. The patient CT head showed no acute abnormalities, there was mild atrophy. The patient C-spine showed chronic changes but no acute fractures. Patient's hip x-ray showed chronic demineralization and operative changes. Right tib-fib x-ray showed no acute abnormalities. Given the patient's persistent hypothermia and inability to care for himself at home, he was ultimately admitted to the medicine service for further work-up and evaluation. The patient's daughter was updated on the plan of care at the bedside. This patient was also evaluated by the attending physician. All care plans werediscussed and agreed upon. Clinical Impression     1. Hypothermia, initial encounter    2. Fall from bed, initial encounter        Disposition     PATIENT REFERRED TO:  No follow-up provider specified.     DISCHARGE MEDICATIONS:  New Prescriptions    No medications on file       DISPOSITION Decision To Admit 01/26/2022 11:12:14 PM     Prema Decker MD  Resident  01/27/22 9142

## 2022-01-27 NOTE — PROGRESS NOTES
Comprehensive Nutrition Assessment    RECOMMENDATIONS:  1. PO Diet: Continue dysphagia soft and bite-sized   2. ONS: Start Ensure Bianca Slack with meals and wound healing supplement BID    NUTRITION ASSESSMENT:    Nutritional summary & status: Received positive nutrition screen for wounds, decreased appetite, and wt loss. Pt eating 25-50% PO intake x 1 meal so far. Pt endorses decreased appetite & wt loss~unable to accurately assess wt changes d/t fluid shifts w/ edema & currently on IVF. Noted pt w/ wounds on bilateral lower extremities and buttocks. Will send ONS to help promote better nutrient intakes and wound healing. Will monitor nutritional status and plan of care.  Admission/PMH: Admit w/ Symptomatic Bradycardia, Cystitis, Rhabdomyolysis, RLE Chronic Venous Stasis Dermatitis with Partial Thickness Ulceration; PMH CAD, status post CABG, heart failure preserved LV ejection fraction, LV ejection fraction 55 to 60%, hypertension, hyperlipidemia, BPH, mildly positive stress test by 2020, status post coronary angiogram     MALNUTRITION ASSESSMENT  Context of Malnutrition: Acute Illness   Malnutrition Status:  At risk for malnutrition (Comment)  Findings of the 6 clinical characteristics of malnutrition (Minimum of 2 out of 6 clinical characteristics is required to make the diagnosis of moderate or severe Protein Calorie Malnutrition based on AND/ASPEN Guidelines):  Energy Intake: Less than/equal to 50% of estimated energy requirements    Energy Intake Time: >1 day     NUTRITION DIAGNOSIS   Inadequate oral intake related to  (decreased appetite) as evidenced by intake 26-50%,poor intake prior to admission    [address] and/or Nutrient Delivery:  Continue Current Diet,Start Oral Nutrition Supplement  Nutrition Education/Counseling:  No recommendation at this time   Goals:  pt will consistently consume >50% PO intake of meals and ONS through adm       Nutrition Monitoring and Evaluation: Food/Nutrient Intake Outcomes:  Food and Nutrient Intake,Supplement Intake  Physical Signs/Symptoms Outcomes:  Biochemical Data,Weight     OBJECTIVE DATA: Significant to nutrition assessment  · Nutrition-Focused Physical Findings: no BM recorded yet; +3 pitting RLE/LLE edema  · Labs: Reviewed; Na 146  · Meds: Reviewed; vitamin B12, dulcolax, colace  · Wounds: Venous Stasis,Wound Consult Pending (bilateral lower extremities and buttocks)       CURRENT NUTRITION THERAPIES  ADULT DIET; Dysphagia - Soft and Bite Sized     PO Intake: 26-50%   PO Supplement Intake:None Ordered  Additional Sources of Calories/IVF:NS @ 75ml/hr     ANTHROPOMETRICS  Current Height: 5' 11\" (180.3 cm)  Current Weight: 205 lb 4 oz (93.1 kg)    Admission weight: 207 lb (93.9 kg)  Ideal Body Weight (IBW): 172 lbs  (78 kg)    Usual Bodyweight  (185-205lb per EMR)   Weight Changes: wt fluctuates d/t fluid shifts   BMI: 28.7    Wt Readings from Last 50 Encounters:   01/27/22 205 lb 4 oz (93.1 kg)   01/20/22 207 lb 3.7 oz (94 kg)   12/28/21 185 lb (83.9 kg)   11/10/21 184 lb 4.8 oz (83.6 kg)   11/03/21 199 lb 4.7 oz (90.4 kg)   09/27/21 187 lb (84.8 kg) stated   09/26/21 187 lb (84.8 kg) stated   08/30/21 187 lb 9.6 oz (85.1 kg) estimated   06/22/21 207 lb (93.9 kg)   05/31/21 213 lb (96.6 kg)     COMPARATIVE STANDARDS  Energy (kcal):  3880-6683; Weight Used for Energy Requirements:  Admission (93kg)     Protein (g):   (1.2-1.4g/kg); Weight Used for Protein Requirements:  Ideal        Fluid (ml/day):  4669-2551; Method Used for Fluid Requirements:  1 ml/kcal      The patient will still be monitored per nutrition standards of care. Consult dietitian if nutrition interventions essential to patient care is needed.      Eduardo Jackson, [address]  Kevin:  [phone]  Office:  [phone]

## 2022-01-27 NOTE — ED PROVIDER NOTES
ED Attending Attestation Note     Date of evaluation: 1/26/2022    This patient was seen by the resident. I have seen and examined the patient, agree with the workup, evaluation, management and diagnosis. The care plan has been discussed. I have reviewed the ECG and concur with the resident's interpretation-notably, sinus bradycardia with artifact related to muscle tremor vs shivering. My assessment reveals an elderly gentleman who reports a mechanical fall without LOC. On exam, no septal hematoma and oropharynx within normal limits. Lungs CTAB. Abd soft, nontender without distention. Negative log roll BLE. Questionable TTP in R hip and R tibia. Pt is notably hypothermic, kole hugger applied. Reportedly may have had a prolonged time sitting on ground, timecourse somewhat unclear, will check for rhabdo.      Eric Rabago MD  01/26/22 2129

## 2022-01-27 NOTE — PROGRESS NOTES
Pt update was given to pt's daughter Susan Durham regarding status and plan of care. All questions answered.

## 2022-01-27 NOTE — PROGRESS NOTES
Pt's daughter Stephanie Medley briefly at pt's bedside today. Jory was updated on pt's condition and plan of care. All questions answered. Jory verbalized understanding.

## 2022-01-27 NOTE — PLAN OF CARE
Problem: Falls - Risk of:  Goal: Will remain free from falls  Description: Will remain free from falls  1/27/2022 1018 by Lucho Bell, RN  Outcome: Ongoing    Bed in lowest position with all wheels locked. Bed alarm in use. Non skid footwear in use. Belongings and call light in reach. Problem: Pain:  Goal: Pain level will decrease  Description: Pain level will decrease  1/27/2022 1018 by Lucho Bell, RN  Outcome: Ongoing    Pt denies pain upon shift assessment. Will continue to monitor.

## 2022-01-27 NOTE — PROGRESS NOTES
4 Eyes Admission Assessment     I agree as the admission nurse that 2 RN's have performed a thorough Head to Toe Skin Assessment on the patient. ALL assessment sites listed below have been assessed on admission. Areas assessed by both nurses: yes  [x]   Head, Face, and Ears   [x]   Shoulders, Back, and Chest  [x]   Arms (Rt upper arm open sore), Elbows, and Hands   [x]   Coccyx, Sacrum (Stage 2; excoriation), and Ischium  [x]   Legs (Lt shin venous stasis ulcer), Feet, and Heels        Does the Patient have Skin Breakdown?   Yes a wound was noted on the Admission Assessment and an LDA was Initiated documentation include the Usha-wound, Wound Assessment, Measurements, Dressing Treatment, Drainage, and Color\",         Terrance Prevention initiated:  Yes   Wound Care Orders initiated:  Yes      [id number]  nurse consulted for Pressure Injury (Stage 3,4, Unstageable, DTI, NWPT, and Complex wounds) or Terrance score 18 or lower:  Yes      Nurse 1 eSignature: Electronically signed by Chris Mendoza RN on 1/27/22 at 5:18 AM EST    **SHARE this note so that the co-signing nurse is able to place an eSignature**    Nurse 2 eSignature: Electronically signed by Luis Carlos Carl RN on 1/27/22 at 4:13 AM EST

## 2022-01-27 NOTE — CARE COORDINATION
Case Management Assessment           Initial Evaluation                Date / Time of Evaluation: 1/27/2022 10:32 AM                 Assessment Completed by: ARIC Shepard LSW    Patient Name: Chelo Boone     YOB: 1940  Diagnosis: Symptomatic sinus bradycardia [R00.1]  Hypothermia, initial encounter [T68. XXXA]  Fall from bed, initial encounter [W06. XXXA]     Date / Time: 1/26/2022  8:07 PM    Patient Admission Status: Inpatient    If patient is discharged prior to next notation, then this note serves as note for discharge by case management. Current PCP: [address] Patient: No    Chart Reviewed: Yes  Patient/ Family Interviewed: Yes    Initial assessment completed at bedside with: daughter    Hospitalization in the last 30 days: Yes    Emergency Contacts:  Extended Emergency Contact Information  Primary Emergency Contact: Lai Alarcon 14 Phone: [phone]  Relation: Child  Secondary Emergency Contact: Patito 22 Phone: [phone]  Work Phone: [phone]  Mobile Phone: [phone]  Relation: Child    Advance Directives:   Code Status: Full [address]: Yes   Copy present: No     In paper Chart: No    Scanned into EMR Yes    Financial  Payor: Katerin Santacruz / Plan: [facility] HMO / Product Type: *No Product type* /     Pre-cert required for SNF: Yes    Mary [facility]acon [phone] - F [phone]  [address] [place] [address]  Phone: [phone] Fax: [phone]      Potential assistance Purchasing Medications: Potential Assistance Purchasing Medications: No  Does Patient want to participate in local refill/ meds to beds program?:      Meds To Beds General Rules:  1. Can ONLY be done Monday- Friday between 8:30am-5pm  2. Prescription(s) must be in pharmacy by 3pm to be filled same day  3. Copy of patient's insurance/ prescription drug card and patient face sheet must be sent along with the prescription(s)  4. Cost of Rx cannot be added to hospital bill. If financial assistance is needed, please contact unit  or ;  or  CANNOT provide pharmacy voucher for patients co-pays  5. Patients can then  the prescription on their way out of the hospital at discharge, or pharmacy can deliver to the bedside if staff is available. (payment due at time of pick-up or delivery - cash, check, or card accepted)     Able to afford home medications/ co-pay costs: Yes    ADLS  Support Systems: Children    PT AM-PAC:   /24  OT AM-PAC:   /24    [place]: home alone   Steps: yes and ramp in back    Plans to RETURN to current housing: No  Barriers to RETURNING to current housing: Pt is unsafe to live alone     Home Care Information  Currently ACTIVE with [address]: Yes  Home Care Agency: [facility]  Phone: [phone]  Fax: [phone]    Currently ACTIVE with [facility]: Yes  Passport/ Waiver: No  Passport/ Waiver Services: Ian CORNELL Provider: none  Equipment: wheelchair    Home Oxygen and [address] [place] prior to admission: No    Dialysis  Active with HD/PD prior to admission: No  Nephrologist:     DISCHARGE PLAN:  Disposition: TBD    Transportation PLAN for discharge: EMS transportation     Factors facilitating achievement of predicted outcomes: Family support    Barriers to discharge: Confusion    Additional Case Management Notes:   Pt is from home alone and not oriented. ELTON met w.Pt's daughter, the family is meeting w/edwin tomorrow to come up w/a DCP. ELTON spoke to [facility], [phone], she was scheduled to meet w/Pt and family on Friday to discuss living options.  She suggested looking into [facility] since Pt is out of SNF days and he currently is unable to transfer safely. ELTON spoke w/Adelia Mason, [phone] they put in an APS referral and there was already one put in as well. They are working on placement for patient. They would like decisional capacity to be determined. The Plan for Transition of Care is related to the following treatment goals of Symptomatic sinus bradycardia [R00.1]  Hypothermia, initial encounter [T68. XXXA]  Fall from bed, initial encounter [W06. XXXA]    The Patient and/or patient representative Harjinder Baker and his family were provided with a choice of provider and agrees with the discharge plan Yes    Freedom of choice list was provided with basic dialogue that supports the patient's individualized plan of care/goals and shares the quality data associated with the providers.  Yes    Care Transition patient: No    ARIC Ochoa, [facility]  Case Management Department  Ph: [phone]   Fax: [phone]

## 2022-01-27 NOTE — H&P
Internal Medicine  PGY 1  History & Physical      CC  Fall    History Obtained From:  Patient andsym EMR    HISTORY OF PRESENT ILLNESS:    The patient is a [de-identified]years old male with past medical history of carotid stenosis, significant for CAD, status post CABG, heart failure preserved LV ejection fraction, LV ejection fraction 55 to 60%, hypertension, hyperlipidemia, BPH, mildly positive stress test by 2020, status post coronary angiogram by Dr. Soni Phan which showed patent coronaries and grafts presented to the ED after a fall at home. He lives alone by himself and is wheelchair-bound. He was found down for an unknown amount of time by his daughter who called the EMS who brought the patient to the hospital. The patient patient reported that he fell as he was transferring himself to the wheelchair, reported no loss of consciousness or injury to head. On arrival to the ED he did not complain of any pain but looked disheveled and had crumbs of food around his mouth. In the ED he was found to be intermittently confused and was hypothermic to 90.3 Fahrenheit, heart rate 59 sinus rhythm, blood pressure 114/75 and saturating 100% on room air. Of note the patient has a recent hospitalization on 01/17/2022 when he presented to the ED with complaint of weakness and fatigue and was noted to be bradycardic and later became hypothermic. Sepsis work-up then was negative, blood cultures and urine cultures from recent hospitalization are negative. He has a chronic Iglesias that was replaced. The patient was seen by cardiology and was supposed to get a 2-week CAM monitor for bradycardia prior to discharge but the monitor could not be placed. The patient was also seen by Dr. Ronaldo Abreu who recommended that the presentation previous hospitalization was more consistent with bacteriuria instead of a UTI.   Patient also got a arterial duplex of bilateral lower extremities that showed a right ROSARIO of 1.5 greater than 50% stenosis at the mid popliteal artery and abnormal monophasic Doppler waveforms. Left extremity ROSARIO was 1.32, no vascular surgery intervention was recommended inpatient and it was recommended that patient follows outpatient with Dr. Alexandre Rocha. Of note, case management was following the case inpatient and per the note, the patient had to clear balance and do a down deposit if he wishes to go to [facility]. He was evaluated at bedside, and was noted to be intermittently confused during the encounter otherwise was alert and oriented x3. History of events was inconsistent in terms of time. He endorsed mind fogginess and constipation but denied any fevers, chills, dysuria, nausea, vomiting, shortness of breath. On exam he was noted to have bruises on the arms BL which he attributed to having thin skin. BL breath sounds were clear, No murmur was appreciated. Abdomen is distended but not tender. He does have right LE wound that seems to be 2/2 venous stasis. BL pitting foot edema was noted. Past Medical History:        Diagnosis Date    BPH (benign prostatic hyperplasia)     Carotid stenosis 12/2013    JESU 12-24% stenosis; LICA 03-26% stenosis    Cellulitis 12/2013    LLE    CH[facility]congestive [facility] failure) [facility])     Chronic back pain     Diverticular disease     GERD (gastroesophageal reflux disease)     History of atrial fibrillation     Hypercholesteremia     Hypertension     Lower GI bleed     MDRO (multiple drug resistant organisms) resistance 10/26/2019    urine    Neuromuscular disorder ([facility])     spasticity    Renal insufficiency     Vitamin B12 deficiency        Past Surgical History:        Procedure Laterality Date    BACK SURGERY  2006    lower lumbar    CORONARY ARTERY BYPASS GRAFT  12/13/2013    CABG x 5 (Dr Janny Mcgee), svg to diag, om1 and 3, distal rca, kelly to lad.      CYSTOSCOPY N/A 1/10/2019    CYSTOSCOPY performed by Becky Currie MD at [facility] [address] Right 5/1/2015    ORIF    LEG SURGERY Right 11/2/2021    RIGHT LOWER EXTREMITY ADVANCEMENT FLAP AND SPLIT THICKNESS SKIN GRAFT PLACEMENT; (WOUND- 10 CM X 5.5 CM; CLOSURE- 6 CM X 5.5 CM; SKIN GRAFT- 7.2 CM X 5 CM) performed by Hill Crooks MD at [address] 6/11/2020    [address] [facility] performed by Alo Buck MD at [address] [facility] 5/4/2020    EGD BIOPSY performed by Alo Buck MD at [facility] [address] Admission:    Not in a hospital admission. Allergies:  Bactrim [sulfamethoxazole-trimethoprim]    Social History:   · TOBACCO:   reports that he quit smoking about 52 years ago. He has never used smokeless tobacco.  · ETOH:   reports no history of alcohol use. · DRUGS : none  · Patient currently alone at his home. ·   Family History:       Problem Relation Age of Onset    Heart Disease Father        Review of Systems   Constitutional: Positive for activity change, appetite change and fatigue. Negative for chills, diaphoresis and fever. Eyes: Positive for discharge. Negative for photophobia, pain and redness. Respiratory: Negative for apnea, cough, chest tightness, shortness of breath and wheezing. Cardiovascular: Positive for leg swelling. Negative for chest pain and palpitations. Gastrointestinal: Positive for abdominal distention and constipation. Negative for abdominal pain and nausea. Genitourinary: Negative for difficulty urinating, dysuria, flank pain, hematuria, penile discharge and penile pain. Musculoskeletal: Negative for back pain. Skin: Positive for pallor. Neurological: Negative for dizziness, tremors, facial asymmetry, light-headedness and headaches. Psychiatric/Behavioral: Positive for decreased concentration. Negative for agitation, behavioral problems, dysphoric mood and hallucinations.        ROS: A 10 point review of systems was conducted, significant findings as noted in HPI. Physical Exam  Constitutional:       General: He is not in acute distress. Appearance: He is not toxic-appearing. HENT:      Nose: No congestion or rhinorrhea. Mouth/Throat:      Mouth: Mucous membranes are dry. Eyes:      General:         Right eye: Discharge present. Left eye: Discharge present. Cardiovascular:      Rate and Rhythm: Regular rhythm. Bradycardia present. Pulses: Normal pulses. Heart sounds: Normal heart sounds. Pulmonary:      Effort: Pulmonary effort is normal.      Breath sounds: Normal breath sounds. Abdominal:      General: There is distension. Tenderness: There is no guarding or rebound. Musculoskeletal:         General: Swelling and tenderness present. Right lower leg: Edema present. Left lower leg: Edema present. Skin:     General: Skin is dry. Neurological:      Mental Status: He is oriented to person, place, and time. Psychiatric:         Mood and Affect: Mood normal.       Physical exam:       Vitals:    01/27/22 0230   BP: 109/63   Pulse: 62   Resp: 10   Temp:    SpO2: 95%       DATA:    Labs:  CBC:   Recent Labs     01/26/22 2056   WBC 6.7   HGB 9.8*   HCT 31.3*          BMP:   Recent Labs     01/26/22 2056   *   K 4.7      CO2 28   BUN 44*   CREATININE 1.3   GLUCOSE 91     LFT's:   Recent Labs     01/26/22 2056   AST 30   ALT 30   BILITOT 0.4   ALKPHOS 168*     Troponin:   Recent Labs     01/26/22 2056 01/26/22 2159   TROPONINI 0.06* 0.05*     BNP:No results for input(s): BNP in the last 72 hours. ABGs: No results for input(s): PHART, TJM8JNV, PO2ART in the last 72 hours. INR: No results for input(s): INR in the last 72 hours.     U/A:  Recent Labs     01/26/22 2201   COLORU Yellow   PHUR 6.0   WBCUA 3-5   RBCUA 0-2   MUCUS 1+*   BACTERIA 3+*   CLARITYU Clear   SPECGRAV >=1.030   LEUKOCYTESUR TRACE*   UROBILINOGEN 0.2   BILIRUBINUR Negative   BLOODU TRACE-INTACT*   GLUCOSEU Negative       XR CHEST PORTABLE   Final Result      1. Decreased bilateral interstitial opacities which may represent improved interstitial edema. 2.  Mild cardiomegaly. 3.  Chronic left basilar pleural and parenchymal opacities, unchanged         XR TIBIA FIBULA RIGHT (2 VIEWS)   Final Result      Pelvis and right hip:   1. Diffuse demineralization limits evaluation. No evidence of acute fracture. 2.  Status post right femur ORIF without evidence of hardware complication. Right tib-fib:   1. No acute osseous findings. XR HIP 2-3 VW W PELVIS RIGHT   Final Result      Pelvis and right hip:   1. Diffuse demineralization limits evaluation. No evidence of acute fracture. 2.  Status post right femur ORIF without evidence of hardware complication. Right tib-fib:   1. No acute osseous findings. CT Head WO Contrast   Final Result      Head   1. No evidence acute intracranial hemorrhage or mass effect. 2.  Mild to moderate atrophy. Cervical spine   1. No evidence of acute fracture or malalignment. 2.  Moderate multilevel cervical spondylosis. CT Cervical Spine WO Contrast   Final Result      Head   1. No evidence acute intracranial hemorrhage or mass effect. 2.  Mild to moderate atrophy. Cervical spine   1. No evidence of acute fracture or malalignment. 2.  Moderate multilevel cervical spondylosis. XR HIP 2-3 VW W PELVIS RIGHT    (Results Pending)   CT CHEST ABDOMEN PELVIS W CONTRAST    (Results Pending)           ASSESSMENT AND PLAN:    [de-identified] yo wheel chair bound male comes to the hospital after a fall. # Symptomatic Bradycardia possibly 2/2 Hypothermia  - Patient found down for unknown duration of time.   - CT Scan of the head unremarkable, CT cervical spine showed no acute fracture or changes. - Patient bradycardic to 59 on arrival, HR down to 30's in previous hospitalization  - Not on remington agents.   - Bradycardia normalized with improving body temperature. - Was supposed to get a CAM monitor placed in last hospitalization. Plan:  - On tele  - Omaira hushantal.  - Consulted Cardiology for placement of CAM monitor. # Hypothermia possibly 2/2 Hypothyroidism or Anemia:  - Patient hypothermic on arrival, bradycardic, has history of severe constipation.   - Anemia on CBC  - Could be 2/2 unknown fall time.   - TSH 5.19 in 1/18/2022, T 4 normal  - F/U TSH w reflex to T4.  - F/U H&H    # Rule out sepsis  - Patient hypotensive on arrival.  - Bradycardic, normal leukocyte count. - F/U pro calcitonin, blood cultures. # Possible Cystitis  - UA on 1/17 - cloudy with 10-20 WBC and moderate leukocyte esterase.  - Replace schneider  - F/U Urine Culture. - F/U CT Scan of Abdomen and Pelvis. # Concern for Rhabdomyolysis  - CK normal, would trend. - Cont IV fluids. # Troponinemia on arrival  - Troponin on arrival 0.06 that has been trending down. # Constipation:  Patient reported constipation for 2 weeks. Would monitor and continuing home meds. # RLE Chronic Venous Stasis Dermatitis w partial thickness ulceration   - RLE wound looks unchanged from prior visit. Podiatry followed the patient in last hospitalization and did not recommend surgical intervention at that point. Patient to follow up with Dr. Idania Acosta from wound care clinic. Plan: Wound care consulted. # BL LE neuropathy:  Continue Gapapentin. # Concern for malignancy:  - Remote smoking history, now presents with anorexia, weight loss. - Also reported hemoptysis but not associated with current presentation.  - F/U CT Chest, Abdomen and Pelvis. # Social work consulted for assistance with discharge planning. Will discuss with attending physician Dr. Arlette Pino MD.     Code Status:Full code  FEN: ADULT DIET; Dysphagia - Soft and Bite Sized  PPX: Lovenox  DISPO: Alysa Denis MD  1/27/2022,  3:27 AM  I saw the patient independently from the resident . I discussed the care with the resident. I personally reviewed the HPI, PH, FH, SH, ROS and medications. I repeated pertinent portions of the examination and reviewed the relevant imaging and laboratory data. I agree with the findings, assessment and plan as documented.  addition to:Plan as above

## 2022-01-27 NOTE — PROGRESS NOTES
Consulted for bilateral lower extremities and buttocks, pt up in chair at this time eating, Wound Care to return to evaluate when patient in bed.

## 2022-01-27 NOTE — ED NOTES
Bed: A11-11  Expected date:   Expected time:   Means of arrival:   Comments:  B23     Eboni Bazan RN  01/26/22 2103

## 2022-01-27 NOTE — PROGRESS NOTES
Progress Note    Admit Date: 1/26/2022  Day: 1  Diet: ADULT DIET; Dysphagia - Soft and Bite Sized    CC: fall    Interval history: No acute events overnight. Patient remained afebrile and HDS with T 97.5 and HR 67. Patient was seen and examined at [facility]. Reports weakness and fatigue currently. Reports abdominal distention after eating food and constipated. He reports bilateral leg pain related to his chronic spasticity. Patient reports that prior to fall he did have palpitations but denies CP. He reports that he has been increasingly getting weak for the past few months with some weight loss and decreased appetite.      Medications:     Scheduled Meds:   aspirin  81 mg Oral Daily    pantoprazole  40 mg Oral QAM AC    vitamin B-12  1,000 mcg Oral Daily    gabapentin  600 mg Oral BID    atorvastatin  80 mg Oral Nightly    sodium chloride flush  5-40 mL IntraVENous 2 times per day    enoxaparin  40 mg SubCUTAneous Daily    docusate sodium  200 mg Oral BID    bisacodyl  10 mg Rectal Daily    oseltamivir  75 mg Oral Once    oseltamivir  30 mg Oral BID     Continuous Infusions:   sodium chloride      sodium chloride 75 mL/hr at 01/27/22 1149     PRN Meds:melatonin, dicyclomine, albuterol, guaiFENesin, sodium chloride flush, sodium chloride, ondansetron **OR** ondansetron, polyethylene glycol, acetaminophen **OR** acetaminophen    Objective:   Vitals:   T-max:  Patient Vitals for the past 8 hrs:   BP Temp Temp src Pulse Resp SpO2   01/27/22 1328    65  98 %   01/27/22 1153 (!) 100/58 98.8 °F (37.1 °C) Axillary 75 18 97 %   01/27/22 1138    67  98 %   01/27/22 0900    99  99 %   01/27/22 0753 (!) 121/59 97.5 °F (36.4 °C) Oral 67 19 99 %   01/27/22 0644  96.8 °F (36 °C)           Intake/Output Summary (Last 24 hours) at 1/27/2022 1419  Last data filed at 1/27/2022 1153  Gross per 24 hour   Intake 380 ml   Output 0 ml   Net 380 ml       Review of Systems   Constitutional: Positive for chills and fatigue. Negative for fever. Respiratory: Negative for cough, chest tightness and shortness of breath. Cardiovascular: Positive for palpitations. Negative for chest pain and leg swelling. Gastrointestinal: Positive for abdominal distention and constipation. Negative for abdominal pain, diarrhea, nausea and vomiting. Genitourinary: Negative for difficulty urinating. Neurological: Positive for dizziness, weakness and light-headedness. Physical Exam  Constitutional:       General: He is not in acute distress. Appearance: Normal appearance. HENT:      Head: Normocephalic and atraumatic. Nose: Nose normal.   Eyes:      Conjunctiva/sclera: Conjunctivae normal.      Pupils: Pupils are equal, round, and reactive to light. Cardiovascular:      Rate and Rhythm: Regular rhythm. Bradycardia present. Pulses: Normal pulses. Heart sounds: No murmur heard. No friction rub. No gallop. Pulmonary:      Effort: Pulmonary effort is normal. No respiratory distress. Breath sounds: Normal breath sounds. No wheezing. Abdominal:      General: Abdomen is flat. Bowel sounds are normal. There is distension. Hernia: A hernia is present. Musculoskeletal:         General: Swelling and tenderness present. Cervical back: Normal range of motion. Skin:     General: Skin is warm and dry. Neurological:      Mental Status: He is alert. Cranial Nerves: No cranial nerve deficit. Sensory: Sensation is intact.       Comments: Oriented to person and place not time         LABS:    CBC:   Recent Labs     01/26/22 2056 01/27/22  0545   WBC 6.7 4.8   HGB 9.8* 8.6*   HCT 31.3* 26.9*    128*   MCV 89.7 88.8     Renal:    Recent Labs     01/26/22 2056 01/27/22  0455   *  --    K 4.7  --      --    CO2 28  --    BUN 44*  --    CREATININE 1.3  --    GLUCOSE 91  --    CALCIUM 9.3  --    MG  --  2.60*   ANIONGAP 10  --      Hepatic:   Recent Labs     01/26/22 2056   AST 30 ALT 30   BILITOT 0.4   PROT 7.8   LABALBU 4.0   ALKPHOS 168*     Troponin:   Recent Labs     01/26/22 2056 01/26/22 2159   TROPONINI 0.06* 0.05*     BNP: No results for input(s): BNP in the last 72 hours. Lipids: No results for input(s): CHOL, HDL in the last 72 hours. Invalid input(s): LDLCALCU, TRIGLYCERIDE  ABGs:  No results for input(s): PHART, WWJ3IKF, PO2ART, EZA1WLT, BEART, THGBART, W5RJJVWK, ZCV8IGE in the last 72 hours. INR: No results for input(s): INR in the last 72 hours. Lactate: No results for input(s): LACTATE in the last 72 hours. Cultures:  -----------------------------------------------------------------  RAD:   CT CHEST ABDOMEN PELVIS W CONTRAST   Final Result     Findings likely consistent with stercoral proctitis. Cystitis    also suspected.       _______________________________________________       IMPRESSION:          Masslike consolidation of the left lung base which could be    secondary to a pneumonia though underlying neoplasm considered. Nearby pleural fluid collection concerning for an empyema. XR CHEST PORTABLE   Final Result      1. Decreased bilateral interstitial opacities which may represent improved interstitial edema. 2.  Mild cardiomegaly. 3.  Chronic left basilar pleural and parenchymal opacities, unchanged         XR TIBIA FIBULA RIGHT (2 VIEWS)   Final Result      Pelvis and right hip:   1. Diffuse demineralization limits evaluation. No evidence of acute fracture. 2.  Status post right femur ORIF without evidence of hardware complication. Right tib-fib:   1. No acute osseous findings. XR HIP 2-3 VW W PELVIS RIGHT   Final Result      Pelvis and right hip:   1. Diffuse demineralization limits evaluation. No evidence of acute fracture. 2.  Status post right femur ORIF without evidence of hardware complication. Right tib-fib:   1. No acute osseous findings. CT Head WO Contrast   Final Result      Head   1. No evidence acute intracranial hemorrhage or mass effect. 2.  Mild to moderate atrophy. Cervical spine   1. No evidence of acute fracture or malalignment. 2.  Moderate multilevel cervical spondylosis. CT Cervical Spine WO Contrast   Final Result      Head   1. No evidence acute intracranial hemorrhage or mass effect. 2.  Mild to moderate atrophy. Cervical spine   1. No evidence of acute fracture or malalignment. 2.  Moderate multilevel cervical spondylosis. XR HIP 2-3 VW W PELVIS RIGHT    (Results Pending)       Assessment/Plan:     1. Symptomatic Bradycardia likely 2/2 Hypothermia  Patient was found down for unknown amount of time. Patient has previous hx of bradycardia with HR down to 30s in previous hospitalizations. Was supposed to have a CAM monitor placed which wasn't placed. Patient is not on any remington agents. Bradycardia is improving with improvements in body temperature. - On telemetry   - Omaira ramos   - [address] cardiology, appreciate recs     2. Hypothermia 2/2 Hypothyroidism or Anemia vs Influenza    Patient was hypothermic on arrival with T 90.3. He reports fatigue, constipation, and difficulty concentrating. TSH 5.19 in 1/18/2022 with normal T4. Denies weight gain, reporting weight loss. CBC showed signs of anemia with Hgb of 8.6 and Hct 26.9. Had fall with unknown fall time. Currently T 97.5.  - F/U TSH with reflex to T4  - F/U cortisol levels, 7.2 on 02/4/2020  - Influenza B positive, started on Tamiflu 30 mg BID    3. Rule out Sepsis   Patient was hypothermic on arrival with bradycardia and intermittently confused. - Procalcitonin 0.12  - Blood cultures pending      4. Cystitis  UA on 1/17 was cloudy with 10-20 WBC and moderate leukocyte esterase. UA today showed 3+ bacteria and trace leukocyte esterase. CT Abd/Pelvis showed thickening of the bladder consistent with possible cystitis. Patient has chronic schneider catheter which was replaced 10 days ago.   - Replace schneider catheter  - F/u on urine cultures     5. Rhabdomyolysis   - CK normal at 225, trend  - Continue IV fluids    6. Troponinemia  - Troponin 0.06 on arrival, now 0.05, stop trending    7. Constipation  Patient reports that he has been constipated for 2 weeks. - Home meds  - started on dulcolax suppository   - Continue to monitor    8. 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 Donna Stone care following     9. BL LE Neuropathy  - Continue Gabapentin    10. Concern for malignancy  - Patient has a smoking history. Denies any alcohol use. Reports anorexia and recent weight loss. He denies hemoptysis currently. - CT Abd/Pelvis/Chest showed left lower lobe consolidation suspicious of pneumonia vs malignancy  -consulted pulmonology, appreciate recs       Code Status: Full code  FEN: ADULT DIET;  Dysphagia - Soft and Bite Sized  PPX: Lovenox  DISPO: Tara Monique MD, PGY-1   01/27/22  2:19 PM    This patient has been staffed and discussed with Linwood Acosta MD.

## 2022-01-27 NOTE — DISCHARGE INSTR - COC
Continuity of Care Form    Patient Name: Amira Stokes   :  1940  MRN:  [id number]    Admit date:  2022  Discharge date:  2022    Code Status Order: Full Code   Advance Directives:      Admitting Physician:  Terry Bentley DO  PCP: Michael Monterroso    Discharging Nurse: [facility] Unit/Room#: 9674/3947-42  Discharging Unit Phone Number: ***    Emergency Contact:   Extended Emergency Contact Information  Primary Emergency Contact: [address], [address] Phone: [phone]  Relation: Child  [facility] Phone: [phone]  Work Phone: [phone]  Mobile Phone: [phone]  Relation: Child    Past Surgical History:  Past Surgical History:   Procedure Laterality Date    BACK SURGERY  2006    lower lumbar    CORONARY ARTERY BYPASS GRAFT  2013    CABG x 5 (Dr Margaret Carrillo), svg to diag, om1 and 3, distal rca, kelly to lad.      CYSTOSCOPY N/A 1/10/2019    CYSTOSCOPY performed by Xavi Haskins MD at [address] Right 2015    ORIF    LEG SURGERY Right 2021    RIGHT LOWER EXTREMITY ADVANCEMENT FLAP AND SPLIT THICKNESS SKIN GRAFT PLACEMENT; (WOUND- 10 CM X 5.5 CM; CLOSURE- 6 CM X 5.5 CM; SKIN GRAFT- 7.2 CM X 5 CM) performed by Elyse Tobar MD at [place] [address] N/A 2020    [facility] performed by Diane Myers MD at [facility] 2020    EGD BIOPSY performed by Diane Myers MD at [address] ENDOSCOPY       Immunization History:   Immunization History   Administered Date(s) Administered    COVID-19, Foster Franny, Primary or Immunocompromised, PF, 100mcg/0.5mL 2021    Influenza A (Y0H1-92) Vaccine PF IM 12/10/2009    Influenza Vaccine, unspecified formulation 2012, 2014, 10/13/2015, 2016, 2017, 10/26/2018, 2019    Influenza Virus Vaccine 2005, 2006, 2012, 2013, N39.0    Encephalopathy acute G93.40    Altered mental status R41.82    Hyperkalemia E87.5    Leg laceration, unspecified laterality, initial encounter S81.819A    Pain of right lower extremity M79.604    Degloving injury T14. 8XXA    Weakness R53.1    Symptomatic bradycardia R00.1    Bilateral leg edema R60.0    Symptomatic sinus bradycardia R00.1       Isolation/Infection:   Isolation            Droplet          Patient Infection Status       Infection Onset Added Last Indicated Last Indicated By Review Planned Expiration Resolved Resolved By    INFLUENZA 01/27/22 01/27/22 01/27/22 Rapid influenza A/B antigens 02/03/22 02/26/22      Resolved    COVID-19 (Rule Out) 01/27/22 01/27/22 01/27/22 COVID-19, Rapid (Ordered)   01/27/22 Rule-Out Test Resulted    COVID-19 (Rule Out) 01/17/22 01/17/22 01/17/22 COVID-19, Rapid (Ordered)   01/17/22 Rule-Out Test Resulted    COVID-19 (Rule Out) 01/17/22 01/17/22 01/17/22 COVID-19, Rapid (Ordered)   01/17/22 Rule-Out Test Resulted    MDRO (multi-drug resistant organism) 06/05/21 06/08/21 06/05/21 Culture, Urine   09/27/21 Jennifer Correa RN    New urine cx on 6/20/2021 with no growth/was negative    COVID-19 (Rule Out) 04/24/21 04/24/21 04/24/21 COVID-19, Rapid (Ordered)   04/24/21 Rule-Out Test Resulted    C-diff Rule Out 04/21/21 04/21/21 04/22/21 GI Bacterial Pathogens By PCR (Ordered)   04/22/21 Alina Atwood RN    Order noted to be discontinued by MD; not included in GI pathogens order    COVID-19 (Rule Out) 02/19/21 02/19/21 02/19/21 COVID-19, Rapid (Ordered)   02/19/21 Rule-Out Test Resulted    C-diff Rule Out 12/13/20 12/13/20 12/13/20 Clostridium difficile toxin/antigen (Ordered)   02/16/21 Linda Don RN    No stool ever sent.      COVID-19 (Rule Out) 06/10/20 06/10/20 06/10/20 COVID-19 (Ordered)   06/10/20 Rule-Out Test Resulted    C-diff Rule Out 06/09/20 06/09/20 06/09/20 GI Bacterial Pathogens By PCR (Ordered)   06/10/20 Rule-Out Test Resulted C-diff Rule Out 06/09/20 06/09/20 06/09/20 Clostridium difficile toxin/antigen (Ordered)   06/09/20 Rule-Out Test Resulted    C-diff Rule Out 05/03/20 05/04/20 05/04/20 C. difficile toxin Molecular (Ordered)   05/05/20 Rule-Out Test Resulted    MDRO (multi-drug resistant organism) 05/02/20 05/04/20 05/02/20 Culture, Urine   04/22/21 Audrey Jane Madeline, RN    Has had new urine culture on 4/17/21 with mixed anne; no MDRO noted    COVID-19 (Rule Out) 05/03/20 05/03/20 05/03/20 COVID-19 (Ordered)   05/03/20 Rule-Out Test Resulted    C-diff Rule Out 05/02/20 05/02/20 05/03/20 Clostridium difficile toxin/antigen (Ordered)   05/03/20 Rule-Out Test Resulted    MRSA 02/07/20 02/08/20 02/07/20 MRSA DNA Probe, Nasal   05/04/20 Annika Quan RN    MDRO (multi-drug resistant organism) 10/26/19 10/28/19 11/19/19 Urine culture   01/16/20 Annika Quan RN            Nurse Assessment:  Last Vital Signs: BP (!) 100/57   Pulse 67   Temp 97.7 °F (36.5 °C) (Axillary)   Resp 18   Ht 5' 11\" (1.803 m)   Wt 205 lb 4 oz (93.1 kg)   SpO2 100%   BMI 28.63 kg/m²     Last documented pain score (0-10 scale): Pain Level: 0  Last Weight:   Wt Readings from Last 1 Encounters:   01/27/22 205 lb 4 oz (93.1 kg)     Mental Status:  oriented and alert    IV Access:  - None    Nursing Mobility/ADLs:  Walking   Dependent  Transfer  Assisted  Bathing  Assisted  Dressing  Assisted  Toileting  Assisted  Feeding  Assisted  Med Admin  Independent  Med Delivery   whole    Wound Care Documentation and Therapy:  Negative Pressure Wound Therapy Leg Anterior; Lower;Right (Active)   Number of days: 86       Wound 11/01/21 Buttocks Right;Left redness, open areas, stage 2 ulcer (Active)   Number of days: 87       Wound 01/27/22 Sacrum Inner;Medial;Left;Right (Active)   Wound Assessment Pink/red 01/27/22 0753   Drainage Amount Scant 01/27/22 0753   Odor None 01/27/22 0753   Usha-wound Assessment Excoriated 01/27/22 0753   Number of days: 0 Wound 01/27/22 Pretibial Right (Active)   Dressing Status Clean;Dry; Intact 01/27/22 0753   Dressing/Treatment Dry dressing 01/27/22 0753   Number of days: 0       Wound 01/27/22 Elbow Right;Posterior (Active)   Dressing Status Clean;Dry; Intact 01/27/22 0753   Dressing/Treatment Foam 01/27/22 0753   Number of days: 0        Elimination:  Continence: Bowel: {YES / [id number]}  Bladder: {YES / WR:26001}  Urinary Catheter: Insertion Date: 1/26/22    Colostomy/Ileostomy/Ileal Conduit: No       Date of Last BM: 2/3/22    Intake/Output Summary (Last 24 hours) at 1/27/2022 1610  Last data filed at 1/27/2022 1530  Gross per 24 hour   Intake 380 ml   Output 0 ml   Net 380 ml     No intake/output data recorded. Safety Concerns:     History of Falls (last 30 days) and At Risk for Falls    Impairments/Disabilities:      None    Nutrition Therapy:  Current Nutrition Therapy:   - Oral Diet:  General and easy to chew    Routes of Feeding: Oral  Liquids: Thin Liquids  Daily Fluid Restriction: no  Last Modified Barium Swallow with Video (Video Swallowing Test): not done    Treatments at the Time of Hospital Discharge:   Respiratory Treatments: ***  Oxygen Therapy:  is not on home oxygen therapy. Ventilator:    - No ventilator support    Heart Failure Instructions for Daily Management  Patient was treated for chronic diastolic heart failure. he  will require the following:    Please weigh daily on the same scale and approximately the same time of day. Report weight gain of 3 pounds/day or 5 pounds/week to : josephine CARREON, INES TSANG None, and Hieu Renae / Paul Mejias [phone]. Please use hospital discharge weight as baseline reference.    Please monitor for signs and symptoms of and report to MD:  Worsening Heart Failure: sudden weight gain, shortness of breath, lower extremity or general edema/swelling, abdominal bloating/swelling, inability to lie flat, intolerance to usual activity, or cough (especially at night). Report these finding even if no increase in weight. Dehydration:  having difficulty or a decrease in urination, dizziness, worsening fatigue, or new onset/worsening of generalized weakness. Please continue a LOW SODIUM diet and LIMIT fluid intake to 48 - 64 ounces ( 1.5 - 2 liters) per day. Call Tameka Peralta [phone] None, facility MD, and [facility] / Getachew Dooley [phone] with any questions or concerns. Please continue heart failure education to patient and family/support system. See After Visit Summary for hospital follow up appointment details. Consider spiritual care referral for support and/or completion of advance directives . Consider: having the facility MD complete required 7 day follow up, [place] [address] telehealth program if patient agreeable and able to participate, and palliative care consult for ongoing goals of care, end of life, and/or chronic disease management discussions.       Rehab Therapies: {THERAPEUTIC INTERVENTION:[id number]}  Weight Bearing Status/Restrictions: No weight bearing restirctions  Other Medical Equipment (for information only, NOT a DME order):  {EQUIPMENT:[id number]}  Other Treatments: ***    Patient's personal belongings (please select all that are sent with patient):  None    RN SIGNATURE:  Electronically signed by Lucia Hughes RN on 2/4/22 at 8:54 AM EST    CASE MANAGEMENT/SOCIAL WORK SECTION    Inpatient Status Date: ***    Readmission Risk Assessment Score:  Readmission Risk              Risk of Unplanned Readmission:  32           Discharging to Facility/ Agency   Name: Yareli Mora  [address] [place] [address]  Anh Mica  CAM:[phone]      / signature: Electronically signed by Bradly Javed RN on 2/7/22 at 10:44 AM EST    PHYSICIAN SECTION    Prognosis: Good    Condition at Discharge: Stable    Rehab Potential (if transferring to Rehab): Good    Recommended Labs or Other Treatments After Discharge:   OT/PT and SLP. Limit sedating medications. Synthyroid 75 mcg with repeat TSH in 4-6 weeks. Please continue Augmentin for 2 more days. Follow up with EP in 2 weeks. Physician Certification: I certify the above information and transfer of Aniyah Thomas  is necessary for the continuing treatment of the diagnosis listed and that he requires SNF for greater 30 days.      Update Admission H&P: No change in H&P    PHYSICIAN SIGNATURE:  Electronically signed by Duane Mojica MD and Claudell Fleck, MD on 2/3/22 at 11:16 AM EST

## 2022-01-28 ENCOUNTER — APPOINTMENT (OUTPATIENT)
Dept: GENERAL RADIOLOGY | Age: 82
DRG: 643 | End: 2022-01-28
Payer: MEDICARE

## 2022-01-28 LAB
ANION GAP SERPL CALCULATED.3IONS-SCNC: 6 MMOL/L (ref 3–16)
BASE EXCESS ARTERIAL: 0 (ref -3–3)
BASOPHILS ABSOLUTE: 0 K/UL (ref 0–0.2)
BASOPHILS RELATIVE PERCENT: 0.4 %
BUN BLDV-MCNC: 40 MG/DL (ref 7–20)
CALCIUM IONIZED: 1.15 MMOL/L (ref 1.12–1.32)
CALCIUM SERPL-MCNC: 8.4 MG/DL (ref 8.3–10.6)
CHLORIDE BLD-SCNC: 111 MMOL/L (ref 99–110)
CO2: 26 MMOL/L (ref 21–32)
CORTISOL TOTAL: 12 UG/DL
CREAT SERPL-MCNC: 1.4 MG/DL (ref 0.8–1.3)
EKG ATRIAL RATE: 105 BPM
EKG DIAGNOSIS: NORMAL
EKG P AXIS: 50 DEGREES
EKG P-R INTERVAL: 232 MS
EKG Q-T INTERVAL: 320 MS
EKG QRS DURATION: 84 MS
EKG QTC CALCULATION (BAZETT): 422 MS
EKG R AXIS: 32 DEGREES
EKG T AXIS: -58 DEGREES
EKG VENTRICULAR RATE: 105 BPM
EOSINOPHILS ABSOLUTE: 0.1 K/UL (ref 0–0.6)
EOSINOPHILS RELATIVE PERCENT: 2 %
GFR AFRICAN AMERICAN: 59
GFR NON-AFRICAN AMERICAN: 49
GLUCOSE BLD-MCNC: 72 MG/DL (ref 70–99)
GLUCOSE BLD-MCNC: 74 MG/DL (ref 70–99)
HCO3 ARTERIAL: 25 MMOL/L (ref 21–29)
HCT VFR BLD CALC: 27 % (ref 40.5–52.5)
HEMOGLOBIN: 8.5 G/DL (ref 13.5–17.5)
LACTATE: 0.58 MMOL/L (ref 0.4–2)
LYMPHOCYTES ABSOLUTE: 1 K/UL (ref 1–5.1)
LYMPHOCYTES RELATIVE PERCENT: 14.2 %
MCH RBC QN AUTO: 28.5 PG (ref 26–34)
MCHC RBC AUTO-ENTMCNC: 31.7 G/DL (ref 31–36)
MCV RBC AUTO: 90 FL (ref 80–100)
MONOCYTES ABSOLUTE: 0.4 K/UL (ref 0–1.3)
MONOCYTES RELATIVE PERCENT: 5.9 %
NEUTROPHILS ABSOLUTE: 5.7 K/UL (ref 1.7–7.7)
NEUTROPHILS RELATIVE PERCENT: 77.5 %
O2 SAT, ARTERIAL: 94 % (ref 93–100)
PCO2 ARTERIAL: 41.1 MM HG (ref 35–45)
PDW BLD-RTO: 17.8 % (ref 12.4–15.4)
PERFORMED ON: ABNORMAL
PH ARTERIAL: 7.39 (ref 7.35–7.45)
PLATELET # BLD: 119 K/UL (ref 135–450)
PMV BLD AUTO: 9.2 FL (ref 5–10.5)
PO2 ARTERIAL: 72.4 MM HG (ref 75–108)
POC POTASSIUM: 4.9 MMOL/L (ref 3.5–5.1)
POC SAMPLE TYPE: ABNORMAL
POC SODIUM: 149 MMOL/L (ref 136–145)
POTASSIUM REFLEX MAGNESIUM: 4.6 MMOL/L (ref 3.5–5.1)
PROCALCITONIN: 0.12 NG/ML (ref 0–0.15)
RBC # BLD: 3 M/UL (ref 4.2–5.9)
SODIUM BLD-SCNC: 143 MMOL/L (ref 136–145)
TCO2 ARTERIAL: 26 MMOL/L
TROPONIN: 0.11 NG/ML
WBC # BLD: 7.3 K/UL (ref 4–11)

## 2022-01-28 PROCEDURE — 1200000000 HC SEMI PRIVATE

## 2022-01-28 PROCEDURE — 82533 TOTAL CORTISOL: CPT

## 2022-01-28 PROCEDURE — 94640 AIRWAY INHALATION TREATMENT: CPT

## 2022-01-28 PROCEDURE — 6360000002 HC RX W HCPCS: Performed by: STUDENT IN AN ORGANIZED HEALTH CARE EDUCATION/TRAINING PROGRAM

## 2022-01-28 PROCEDURE — 84295 ASSAY OF SERUM SODIUM: CPT

## 2022-01-28 PROCEDURE — 80048 BASIC METABOLIC PNL TOTAL CA: CPT

## 2022-01-28 PROCEDURE — 6370000000 HC RX 637 (ALT 250 FOR IP)

## 2022-01-28 PROCEDURE — 93005 ELECTROCARDIOGRAM TRACING: CPT

## 2022-01-28 PROCEDURE — 2580000003 HC RX 258

## 2022-01-28 PROCEDURE — 36415 COLL VENOUS BLD VENIPUNCTURE: CPT

## 2022-01-28 PROCEDURE — 71045 X-RAY EXAM CHEST 1 VIEW: CPT

## 2022-01-28 PROCEDURE — 82947 ASSAY GLUCOSE BLOOD QUANT: CPT

## 2022-01-28 PROCEDURE — 2580000003 HC RX 258: Performed by: STUDENT IN AN ORGANIZED HEALTH CARE EDUCATION/TRAINING PROGRAM

## 2022-01-28 PROCEDURE — 84484 ASSAY OF TROPONIN QUANT: CPT

## 2022-01-28 PROCEDURE — 93010 ELECTROCARDIOGRAM REPORT: CPT | Performed by: INTERNAL MEDICINE

## 2022-01-28 PROCEDURE — 83605 ASSAY OF LACTIC ACID: CPT

## 2022-01-28 PROCEDURE — 82330 ASSAY OF CALCIUM: CPT

## 2022-01-28 PROCEDURE — 99233 SBSQ HOSP IP/OBS HIGH 50: CPT | Performed by: NURSE PRACTITIONER

## 2022-01-28 PROCEDURE — 82803 BLOOD GASES ANY COMBINATION: CPT

## 2022-01-28 PROCEDURE — 36600 WITHDRAWAL OF ARTERIAL BLOOD: CPT

## 2022-01-28 PROCEDURE — 94761 N-INVAS EAR/PLS OXIMETRY MLT: CPT

## 2022-01-28 PROCEDURE — 84132 ASSAY OF SERUM POTASSIUM: CPT

## 2022-01-28 PROCEDURE — 84145 PROCALCITONIN (PCT): CPT

## 2022-01-28 PROCEDURE — 85025 COMPLETE CBC W/AUTO DIFF WBC: CPT

## 2022-01-28 RX ORDER — SODIUM CHLORIDE FOR INHALATION 3 %
4 VIAL, NEBULIZER (ML) INHALATION PRN
Status: DISCONTINUED | OUTPATIENT
Start: 2022-01-28 | End: 2022-02-01

## 2022-01-28 RX ORDER — LEVOTHYROXINE SODIUM ANHYDROUS 100 UG/5ML
56 INJECTION, POWDER, LYOPHILIZED, FOR SOLUTION INTRAVENOUS DAILY
Status: DISCONTINUED | OUTPATIENT
Start: 2022-01-29 | End: 2022-01-31

## 2022-01-28 RX ORDER — SODIUM CHLORIDE 9 MG/ML
5 INJECTION INTRAVENOUS DAILY
Status: DISCONTINUED | OUTPATIENT
Start: 2022-01-29 | End: 2022-01-31

## 2022-01-28 RX ORDER — IPRATROPIUM BROMIDE AND ALBUTEROL SULFATE 2.5; .5 MG/3ML; MG/3ML
1 SOLUTION RESPIRATORY (INHALATION)
Status: DISCONTINUED | OUTPATIENT
Start: 2022-01-28 | End: 2022-01-31

## 2022-01-28 RX ORDER — LEVOTHYROXINE SODIUM 0.07 MG/1
75 TABLET ORAL DAILY
Status: DISCONTINUED | OUTPATIENT
Start: 2022-01-28 | End: 2022-01-28

## 2022-01-28 RX ORDER — SODIUM CHLORIDE, SODIUM LACTATE, POTASSIUM CHLORIDE, CALCIUM CHLORIDE 600; 310; 30; 20 MG/100ML; MG/100ML; MG/100ML; MG/100ML
INJECTION, SOLUTION INTRAVENOUS CONTINUOUS
Status: DISCONTINUED | OUTPATIENT
Start: 2022-01-28 | End: 2022-01-30

## 2022-01-28 RX ADMIN — IPRATROPIUM BROMIDE AND ALBUTEROL SULFATE 1 AMPULE: 2.5; .5 SOLUTION RESPIRATORY (INHALATION) at 16:11

## 2022-01-28 RX ADMIN — SODIUM CHLORIDE, PRESERVATIVE FREE 10 ML: 5 INJECTION INTRAVENOUS at 08:10

## 2022-01-28 RX ADMIN — SODIUM CHLORIDE: 9 INJECTION, SOLUTION INTRAVENOUS at 00:17

## 2022-01-28 RX ADMIN — PANTOPRAZOLE SODIUM 40 MG: 40 TABLET, DELAYED RELEASE ORAL at 06:50

## 2022-01-28 RX ADMIN — AMPICILLIN AND SULBACTAM 1500 MG: 1; .5 INJECTION, POWDER, FOR SOLUTION INTRAMUSCULAR; INTRAVENOUS at 22:47

## 2022-01-28 RX ADMIN — IPRATROPIUM BROMIDE AND ALBUTEROL SULFATE 1 AMPULE: 2.5; .5 SOLUTION RESPIRATORY (INHALATION) at 20:41

## 2022-01-28 RX ADMIN — SODIUM CHLORIDE, POTASSIUM CHLORIDE, SODIUM LACTATE AND CALCIUM CHLORIDE: 600; 310; 30; 20 INJECTION, SOLUTION INTRAVENOUS at 17:52

## 2022-01-28 RX ADMIN — AMPICILLIN AND SULBACTAM 1500 MG: 1; .5 INJECTION, POWDER, FOR SOLUTION INTRAMUSCULAR; INTRAVENOUS at 17:52

## 2022-01-28 ASSESSMENT — ENCOUNTER SYMPTOMS
VOMITING: 0
ABDOMINAL PAIN: 0
CONSTIPATION: 1
ABDOMINAL DISTENTION: 1
DIARRHEA: 0
NAUSEA: 0
CHEST TIGHTNESS: 0
SHORTNESS OF BREATH: 0
COUGH: 0

## 2022-01-28 ASSESSMENT — PAIN SCALES - GENERAL
PAINLEVEL_OUTOF10: 0

## 2022-01-28 NOTE — PLAN OF CARE
Problem: Nutrition  Goal: Optimal nutrition therapy  1/28/2022 1607 by Lucas Gonzales RN  Outcome: Not Met This Shift

## 2022-01-28 NOTE — PROGRESS NOTES
Progress Note    Admit Date: 1/26/2022  Day: 2  Diet: ADULT DIET; Dysphagia - Soft and Bite Sized; Low Sodium (2 gm)  ADULT ORAL NUTRITION SUPPLEMENT; Breakfast, Lunch; Standard High Calorie/High Protein Oral Supplement  ADULT ORAL NUTRITION SUPPLEMENT; Dinner, Breakfast; Wound Healing Oral Supplement    CC: fall    Interval history: No acute events overnight. Patient remained afebrile with T 97.5 and bradycardic at HR 54. Patient was seen and examined at [facility]. Medications:     Scheduled Meds:   aspirin  81 mg Oral Daily    pantoprazole  40 mg Oral QAM AC    vitamin B-12  1,000 mcg Oral Daily    gabapentin  600 mg Oral BID    atorvastatin  80 mg Oral Nightly    sodium chloride flush  5-40 mL IntraVENous 2 times per day    enoxaparin  40 mg SubCUTAneous Daily    docusate sodium  200 mg Oral BID    bisacodyl  10 mg Rectal Daily    oseltamivir  30 mg Oral BID     Continuous Infusions:   sodium chloride      sodium chloride 75 mL/hr at 01/28/22 0017     PRN Meds:melatonin, dicyclomine, albuterol, guaiFENesin, sodium chloride flush, sodium chloride, ondansetron **OR** ondansetron, polyethylene glycol, acetaminophen **OR** acetaminophen    Objective:   Vitals:   T-max:  Patient Vitals for the past 8 hrs:   BP Temp Temp src Pulse Resp SpO2   01/28/22 0408 104/61 97.4 °F (36.3 °C) Axillary 54 13 100 %   01/28/22 0003 91/61 94.5 °F (34.7 °C) Axillary 53 15 97 %       Intake/Output Summary (Last 24 hours) at 1/28/2022 0653  Last data filed at 1/28/2022 0530  Gross per 24 hour   Intake 860 ml   Output 890 ml   Net -30 ml       Review of Systems   Constitutional: Positive for chills and fatigue. Negative for fever. Respiratory: Negative for cough, chest tightness and shortness of breath. Cardiovascular: Positive for palpitations. Negative for chest pain and leg swelling. Gastrointestinal: Positive for abdominal distention and constipation. Negative for abdominal pain, diarrhea, nausea and vomiting. Genitourinary: Negative for difficulty urinating. Neurological: Positive for dizziness, weakness and light-headedness. Physical Exam  Constitutional:       General: He is not in acute distress. Appearance: Normal appearance. HENT:      Head: Normocephalic and atraumatic. Nose: Nose normal.   Eyes:      Conjunctiva/sclera: Conjunctivae normal.      Pupils: Pupils are equal, round, and reactive to light. Cardiovascular:      Rate and Rhythm: Regular rhythm. Bradycardia present. Pulses: Normal pulses. Heart sounds: No murmur heard. No friction rub. No gallop. Pulmonary:      Effort: Pulmonary effort is normal. No respiratory distress. Breath sounds: Normal breath sounds. No wheezing. Abdominal:      General: Abdomen is flat. Bowel sounds are normal. There is distension. Hernia: A hernia is present. Musculoskeletal:         General: Swelling and tenderness present. Cervical back: Normal range of motion. Skin:     General: Skin is warm and dry. Neurological:      Mental Status: He is alert. Cranial Nerves: No cranial nerve deficit. Sensory: Sensation is intact. Comments: Oriented to person and place not time         LABS:    CBC:   Recent Labs     01/26/22 2056 01/27/22  0545   WBC 6.7 4.8   HGB 9.8* 8.6*   HCT 31.3* 26.9*    128*   MCV 89.7 88.8     Renal:    Recent Labs     01/26/22 2056 01/27/22  0455   *  --    K 4.7  --      --    CO2 28  --    BUN 44*  --    CREATININE 1.3  --    GLUCOSE 91  --    CALCIUM 9.3  --    MG  --  2.60*   ANIONGAP 10  --      Hepatic:   Recent Labs     01/26/22 2056   AST 30   ALT 30   BILITOT 0.4   PROT 7.8   LABALBU 4.0   ALKPHOS 168*     Troponin:   Recent Labs     01/26/22 2056 01/26/22 2159   TROPONINI 0.06* 0.05*     BNP: No results for input(s): BNP in the last 72 hours. Lipids: No results for input(s): CHOL, HDL in the last 72 hours.     Invalid input(s): LDLCALCU, TRIGLYCERIDE  ABGs:  No results for input(s): PHART, FWA3BXS, PO2ART, SMZ4ADU, BEART, THGBART, P4LDCXXO, OAE2JGG in the last 72 hours. INR: No results for input(s): INR in the last 72 hours. Lactate: No results for input(s): LACTATE in the last 72 hours. Cultures:  -----------------------------------------------------------------  RAD:   CT CHEST ABDOMEN PELVIS W CONTRAST   Final Result     Findings likely consistent with stercoral proctitis. Cystitis    also suspected.       _______________________________________________       IMPRESSION:          Masslike consolidation of the left lung base which could be    secondary to a pneumonia though underlying neoplasm considered. Nearby pleural fluid collection concerning for an empyema. XR CHEST PORTABLE   Final Result      1. Decreased bilateral interstitial opacities which may represent improved interstitial edema. 2.  Mild cardiomegaly. 3.  Chronic left basilar pleural and parenchymal opacities, unchanged         XR TIBIA FIBULA RIGHT (2 VIEWS)   Final Result      Pelvis and right hip:   1. Diffuse demineralization limits evaluation. No evidence of acute fracture. 2.  Status post right femur ORIF without evidence of hardware complication. Right tib-fib:   1. No acute osseous findings. XR HIP 2-3 VW W PELVIS RIGHT   Final Result      Pelvis and right hip:   1. Diffuse demineralization limits evaluation. No evidence of acute fracture. 2.  Status post right femur ORIF without evidence of hardware complication. Right tib-fib:   1. No acute osseous findings. CT Head WO Contrast   Final Result      Head   1. No evidence acute intracranial hemorrhage or mass effect. 2.  Mild to moderate atrophy. Cervical spine   1. No evidence of acute fracture or malalignment. 2.  Moderate multilevel cervical spondylosis. CT Cervical Spine WO Contrast   Final Result      Head   1.   No evidence acute intracranial hemorrhage or mass effect. 2.  Mild to moderate atrophy. Cervical spine   1. No evidence of acute fracture or malalignment. 2.  Moderate multilevel cervical spondylosis. XR HIP 2-3 VW W PELVIS RIGHT    (Results Pending)       Assessment/Plan:     1. Symptomatic Bradycardia likely 2/2 Hypothermia  Patient was found down for unknown amount of time. Patient has previous hx of bradycardia with HR down to 30s in previous hospitalizations. Was supposed to have a CAM monitor placed which wasn't placed. Patient is not on any remington agents. Bradycardia is improving with improvements in body temperature. - On telemetry   - Omaira ramos   - [facility] [address] cardiology, appreciate recs     2. Hypothermia 2/2 Hypothyroidism or Anemia vs Influenza    Patient was hypothermic on arrival with T 90.3. He reports fatigue, constipation, and difficulty concentrating. TSH 5.19 in 1/18/2022 with normal T4. Denies weight gain, reporting weight loss. CBC showed signs of anemia with Hgb of 8.6 and Hct 26.9. Had fall with unknown fall time. Currently T 97.5.  - TSH 5.40, T4 1.0  - F/U cortisol levels, 7.2 on 02/4/2020  - Influenza B positive, started on Tamiflu 30 mg BID    3. Rule out Sepsis   Patient was hypothermic on arrival with bradycardia and intermittently confused. - Procalcitonin 0.12  - Blood cultures NGTD     4. Cystitis  UA on 1/17 was cloudy with 10-20 WBC and moderate leukocyte esterase. UA today showed 3+ bacteria and trace leukocyte esterase. CT Abd/Pelvis showed thickening of the bladder consistent with possible cystitis. Patient has chronic schneider catheter which was replaced 10 days ago. - Schneider replaced  - F/u on urine cultures     5. Rhabdomyolysis   - CK normal at 225, trend  - Continue IV fluids    6. Troponinemia  - Troponin 0.06 on arrival, now 0.05, stop trending    7. Constipation  Patient reports that he has been constipated for 2 weeks.   - Home meds  - Started on dulcolax suppository - Continue to monitor    8. 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 Goldstein Shall care following     9. BL LE Neuropathy  - Continue Gabapentin    10. Concern for malignancy  - Patient has a smoking history. Denies any alcohol use. Reports anorexia and recent weight loss. He denies hemoptysis currently. - CT Abd/Pelvis/Chest showed left lower lobe consolidation suspicious of pneumonia vs malignancy  -Consulted pulmonology, will get lung biopsy       Code Status: Full code  FEN: ADULT DIET; Dysphagia - Soft and Bite Sized;  Low Sodium (2 gm)  ADULT ORAL NUTRITION SUPPLEMENT; Breakfast, Lunch; Standard High Calorie/High Protein Oral Supplement  ADULT ORAL NUTRITION SUPPLEMENT; Dinner, Breakfast; Wound Healing Oral Supplement  PPX: Lovenox  DISPO: Everette Estes, PGY-1   01/28/22  6:53 AM    This patient has been staffed and discussed with Dante Webber MD.

## 2022-01-28 NOTE — RT PROTOCOL NOTE
RT Inhaler-Nebulizer Bronchodilator Protocol Note    There is a bronchodilator order in the chart from a provider indicating to follow the RT Bronchodilator Protocol and there is an Initiate RT Inhaler-Nebulizer Bronchodilator Protocol order as well (see protocol at bottom of note). CXR Findings:  XR CHEST PORTABLE    Result Date: 1/26/2022  1. Decreased bilateral interstitial opacities which may represent improved interstitial edema. 2.  Mild cardiomegaly. 3.  Chronic left basilar pleural and parenchymal opacities, unchanged       The findings from the last RT Protocol Assessment were as follows:   History Pulmonary Disease: Smoker 15 pack years or more  Respiratory Pattern: Regular pattern and RR 12-20 bpm  Breath Sounds: Slightly diminished and/or crackles  Cough: Strong, spontaneous, non-productive  Indication for Bronchodilator Therapy: On home bronchodilators  Bronchodilator Assessment Score: 3    Aerosolized bronchodilator medication orders have been revised according to the RT Inhaler-Nebulizer Bronchodilator Protocol below. Respiratory Therapist to perform RT Therapy Protocol Assessment initially then follow the protocol. Repeat RT Therapy Protocol Assessment PRN for score 0-3 or on second treatment, BID, and PRN for scores above 3. No Indications  adjust the frequency to every 6 hours PRN wheezing or bronchospasm, if no treatments needed after 48 hours then discontinue using Per Protocol order mode. If indication present, adjust the RT bronchodilator orders based on the Bronchodilator Assessment Score as indicated below. Use Inhaler orders unless patient has one or more of the following: on home nebulizer, not able to hold breath for 10 seconds, is not alert and oriented, cannot activate and use MDI correctly, or respiratory rate 25 breaths per minute or more, then use the equivalent nebulizer order(s) with same Frequency and PRN reasons based on the score.   If a patient is on this

## 2022-01-28 NOTE — PLAN OF CARE
Pt A/O, denies SOB or dizziness, and VSS. Meds given per eMAR. Pt complained of pain in BLE and was given PRN tylenol. Iglesias in place. Pt is resting with no other complaints a time. Call light within reach.        Problem: Falls - Risk of:  Goal: Will remain free from falls  Description: Will remain free from falls  Outcome: Ongoing  Goal: Absence of physical injury  Description: Absence of physical injury  Outcome: Ongoing     Problem: Skin Integrity:  Goal: Will show no infection signs and symptoms  Description: Will show no infection signs and symptoms  Outcome: Ongoing  Goal: Absence of new skin breakdown  Description: Absence of new skin breakdown  Outcome: Ongoing     Problem: Pain:  Goal: Pain level will decrease  Description: Pain level will decrease  Outcome: Ongoing  Goal: Control of acute pain  Description: Control of acute pain  Outcome: Ongoing  Goal: Control of chronic pain  Description: Control of chronic pain  Outcome: Ongoing     Problem: Nutrition  Goal: Optimal nutrition therapy  1/28/2022 0523 by Aliya Alejandro RN  Outcome: Ongoing  1/27/2022 1609 by Dionne Rees, MS, RD, LD  Outcome: Ongoing  Note: Nutrition Problem #1: Inadequate oral intake  Intervention: Food and/or Nutrient Delivery: Continue Current Diet,Start Oral Nutrition Supplement  Nutritional Goals: pt will consistently consume >50% PO intake of meals and ONS through adm

## 2022-01-28 NOTE — CONSULTS
Palliative Care Consult:    Discussed case with Dr. Tracie Morris. Spoke with daughter Anita Lynch at the bedside. She says pt's son Travis Castorena is the HCPOA and we got him on the phone to discuss code status and intubation. Travis Castorena says pt is DNR, and he's not sure about intubation. He'd like to speak with Anita Lynch and their other sibling Janette Kan this evening at 9, and then will call the nurse with their decision about whether pt would want intubation or not. He will email the [employer]. Anita Lynch is in agreement with [facility] for now. Updated resident and RN.     Risa Rangel, RACIEL  [address]

## 2022-01-28 NOTE — CARE COORDINATION
Patient is not oriented today, per RN David Ruano, is hypotensive, IVF and IV abx. CM went to meet with patient and daughter at bedside today, resident physicians in room. Patient will need placed at discharge, APS involved. Patient has not been caring for himself at home and refusing to pay outstanding SNF charges. SW following.       Tamiko Mcgill, RN, BSN,   4th Floor Progressive Care Unit  [phone]

## 2022-01-28 NOTE — PROGRESS NOTES
Progress Note    Admit Date: 1/26/2022  Day: 2  Diet: ADULT DIET; Dysphagia - Soft and Bite Sized; Low Sodium (2 gm)  ADULT ORAL NUTRITION SUPPLEMENT; Breakfast, Lunch; Standard High Calorie/High Protein Oral Supplement  ADULT ORAL NUTRITION SUPPLEMENT; Dinner, Breakfast; Wound Healing Oral Supplement    CC: fall    Interval history: No acute events overnight. Patient remained afebrile and HDS with T 97.5 and HR 67. Patient was seen and examined at [facility]. Reports weakness and fatigue currently. He reports bilateral leg pain related to his chronic spasticity. He denies any nausea, vomiting, abdominal pain or chest pain. Medications:     Scheduled Meds:   levothyroxine  75 mcg Oral Daily    aspirin  81 mg Oral Daily    pantoprazole  40 mg Oral QAM AC    vitamin B-12  1,000 mcg Oral Daily    gabapentin  600 mg Oral BID    atorvastatin  80 mg Oral Nightly    sodium chloride flush  5-40 mL IntraVENous 2 times per day    enoxaparin  40 mg SubCUTAneous Daily    docusate sodium  200 mg Oral BID    bisacodyl  10 mg Rectal Daily    oseltamivir  30 mg Oral BID     Continuous Infusions:   sodium chloride       PRN Meds:melatonin, dicyclomine, albuterol, guaiFENesin, sodium chloride flush, sodium chloride, ondansetron **OR** ondansetron, polyethylene glycol, acetaminophen **OR** acetaminophen    Objective:   Vitals:   T-max:  Patient Vitals for the past 8 hrs:   BP Temp Temp src Pulse Resp SpO2   01/28/22 0850 124/62 98.4 °F (36.9 °C) Axillary 59 13 100 %   01/28/22 0408 104/61 97.4 °F (36.3 °C) Axillary 54 13 100 %       Intake/Output Summary (Last 24 hours) at 1/28/2022 1010  Last data filed at 1/28/2022 0530  Gross per 24 hour   Intake 860 ml   Output 890 ml   Net -30 ml       Review of Systems   Constitutional: Positive for chills and fatigue. Negative for fever. Respiratory: Negative for cough, chest tightness and shortness of breath. Cardiovascular: Positive for palpitations.  Negative for chest pain and leg swelling. Gastrointestinal: Positive for abdominal distention and constipation. Negative for abdominal pain, diarrhea, nausea and vomiting. Genitourinary: Negative for difficulty urinating. Neurological: Positive for dizziness, weakness and light-headedness. Physical Exam  Constitutional:       General: He is not in acute distress. Appearance: Normal appearance. HENT:      Head: Normocephalic and atraumatic. Nose: Nose normal.   Eyes:      Conjunctiva/sclera: Conjunctivae normal.      Pupils: Pupils are equal, round, and reactive to light. Cardiovascular:      Rate and Rhythm: Regular rhythm. Bradycardia present. Pulses: Normal pulses. Heart sounds: No murmur heard. No friction rub. No gallop. Pulmonary:      Effort: Pulmonary effort is normal. No respiratory distress. Breath sounds: Normal breath sounds. No wheezing. Abdominal:      General: Abdomen is flat. Bowel sounds are normal. There is distension. Hernia: A hernia is present. Musculoskeletal:         General: Swelling and tenderness present. Cervical back: Normal range of motion. Skin:     General: Skin is warm and dry. Neurological:      Mental Status: He is alert. Cranial Nerves: No cranial nerve deficit. Sensory: Sensation is intact.       Comments: Oriented to person and place not time         LABS:    CBC:   Recent Labs     01/26/22 2056 01/27/22  0545 01/28/22  0600   WBC 6.7 4.8 7.3   HGB 9.8* 8.6* 8.5*   HCT 31.3* 26.9* 27.0*    128* 119*   MCV 89.7 88.8 90.0     Renal:    Recent Labs     01/26/22 2056 01/27/22  0455 01/28/22  0600   *  --  143   K 4.7  --  4.6     --  111*   CO2 28  --  26   BUN 44*  --  40*   CREATININE 1.3  --  1.4*   GLUCOSE 91  --  74   CALCIUM 9.3  --  8.4   MG  --  2.60*  --    ANIONGAP 10  --  6     Hepatic:   Recent Labs     01/26/22 2056   AST 30   ALT 30   BILITOT 0.4   PROT 7.8   LABALBU 4.0   ALKPHOS 168*     Troponin: Recent Labs     01/26/22 2056 01/26/22 2159   TROPONINI 0.06* 0.05*     BNP: No results for input(s): BNP in the last 72 hours. Lipids: No results for input(s): CHOL, HDL in the last 72 hours. Invalid input(s): LDLCALCU, TRIGLYCERIDE  ABGs:  No results for input(s): PHART, KYZ3RPI, PO2ART, KXK7UXP, BEART, THGBART, H3RYFDHR, NYB2GCQ in the last 72 hours. INR: No results for input(s): INR in the last 72 hours. Lactate: No results for input(s): LACTATE in the last 72 hours. Cultures:  -----------------------------------------------------------------  RAD:   CT CHEST ABDOMEN PELVIS W CONTRAST   Final Result     Findings likely consistent with stercoral proctitis. Cystitis    also suspected.       _______________________________________________       IMPRESSION:          Masslike consolidation of the left lung base which could be    secondary to a pneumonia though underlying neoplasm considered. Nearby pleural fluid collection concerning for an empyema. XR CHEST PORTABLE   Final Result      1. Decreased bilateral interstitial opacities which may represent improved interstitial edema. 2.  Mild cardiomegaly. 3.  Chronic left basilar pleural and parenchymal opacities, unchanged         XR TIBIA FIBULA RIGHT (2 VIEWS)   Final Result      Pelvis and right hip:   1. Diffuse demineralization limits evaluation. No evidence of acute fracture. 2.  Status post right femur ORIF without evidence of hardware complication. Right tib-fib:   1. No acute osseous findings. XR HIP 2-3 VW W PELVIS RIGHT   Final Result      Pelvis and right hip:   1. Diffuse demineralization limits evaluation. No evidence of acute fracture. 2.  Status post right femur ORIF without evidence of hardware complication. Right tib-fib:   1. No acute osseous findings. CT Head WO Contrast   Final Result      Head   1. No evidence acute intracranial hemorrhage or mass effect.    2.  Mild to moderate atrophy. Cervical spine   1. No evidence of acute fracture or malalignment. 2.  Moderate multilevel cervical spondylosis. CT Cervical Spine WO Contrast   Final Result      Head   1. No evidence acute intracranial hemorrhage or mass effect. 2.  Mild to moderate atrophy. Cervical spine   1. No evidence of acute fracture or malalignment. 2.  Moderate multilevel cervical spondylosis. XR HIP 2-3 VW W PELVIS RIGHT    (Results Pending)       Assessment/Plan:     1. Symptomatic Bradycardia likely 2/2 Hypothermia  Patient was found down for unknown amount of time. Patient has previous hx of bradycardia with HR down to 30s in previous hospitalizations. Was supposed to have a CAM monitor placed which wasn't placed. Patient is not on any remington agents. Bradycardia is improving with improvements in body temperature. - On telemetry   - Omaira ramos   - [facility] [address] cardiology, appreciate recs     2. Hypothermia 2/2 Hypothyroidism or Anemia vs Influenza    Patient was hypothermic on arrival with T 90.3. He reports fatigue, constipation, and difficulty concentrating. TSH 5.19 in 1/18/2022 with normal T4. Denies weight gain, reporting weight loss. CBC showed signs of anemia with Hgb of 8.6 and Hct 26.9. Had fall with unknown fall time. Currently T 97.5.  - TSH 5.4 with reflex to T4 1  - F/U cortisol levels, 7.2 on 02/4/2020  - Influenza B positive, continue Tamiflu 30 mg BID    3. Rule out Sepsis   Patient was hypothermic on arrival with bradycardia and intermittently confused. - Procalcitonin 0.12  - Blood cultures show NGTD     4. Cystitis  UA on 1/17 was cloudy with 10-20 WBC and moderate leukocyte esterase. UA today showed 3+ bacteria and trace leukocyte esterase. CT Abd/Pelvis showed thickening of the bladder consistent with possible cystitis. Patient has chronic schneider catheter which was replaced 10 days ago. - Replace schneider catheter  - F/u on urine cultures     5.  Rhabdomyolysis - CK normal at 225, trend  - s/p IV fluids    6. Troponinemia  - Troponin 0.06 on arrival, now 0.05, stop trending    7. Constipation  Patient reports that he has been constipated for 2 weeks. - Home meds  - started on dulcolax suppository   - Continue to monitor    8. 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 Dulce Romero care following     9. BL LE Neuropathy  - Continue Gabapentin    10. Concern for malignancy  - Patient has a smoking history. Denies any alcohol use. Reports anorexia and recent weight loss. He denies hemoptysis currently. - CT Abd/Pelvis/Chest showed left lower lobe consolidation suspicious of pneumonia vs malignancy  -consulted pulmonology, will likely need a repeat scan in 3 months       Code Status: Full code  FEN: ADULT DIET; Dysphagia - Soft and Bite Sized;  Low Sodium (2 gm)  ADULT ORAL NUTRITION SUPPLEMENT; Breakfast, Lunch; Standard High Calorie/High Protein Oral Supplement  ADULT ORAL NUTRITION SUPPLEMENT; Dinner, Breakfast; Wound Healing Oral Supplement  PPX: Lovenox  DISPO: Johnny Roberts MD, PGY-1   01/28/22  10:10 AM    This patient has been staffed and discussed with Ramesh Lee MD.

## 2022-01-28 NOTE — PROGRESS NOTES
01/28/2022    CO2 26 01/28/2022    BUN 40 01/28/2022    CREATININE 1.4 01/28/2022    GLUCOSE 74 01/28/2022     INR:   Lab Results   Component Value Date    INR 0.97 10/31/2021    INR 1.22 04/22/2021    INR 1.21 02/14/2021        CARDIAC LABS  ENZYMES:  Recent Labs     01/26/22 2056 01/26/22  2159   TROPONINI 0.06* 0.05*     FASTING LIPID PANEL:  Lab Results   Component Value Date    HDL 43 06/05/2021    LDLCALC 48 06/05/2021    TRIG 54 06/05/2021    TSH 3.05 06/05/2021     LIVER PROFILE:  Lab Results   Component Value Date    AST 30 01/26/2022    AST 15 01/20/2022    ALT 30 01/26/2022    ALT 12 01/20/2022       -----------------------------------------------------------------  Telemetry: Personally reviewed  SB/NSR    Objective:   Vitals: /61   Pulse 54   Temp 97.4 °F (36.3 °C) (Axillary)   Resp 13   Ht 5' 11\" (1.803 m)   Wt 205 lb 4 oz (93.1 kg)   SpO2 100%   BMI 28.63 kg/m²   General appearance: appears ill  Eyes: Conjunctiva and pupils normal   Skin: Skin color, texture, turgor normal  Neck: no JVD, supple, symmetrical, trachea midline   Lungs: no accessory muscle use, no respiratory distress  Heart: RRR, hodan at times  Abdomen: soft, non-tender; bowel sounds normal  Extremities: No edema, DP +  Psychiatric: abnormal insight and affect    Patient Active Problem List:     Hypotension     Light headed     Cellulitis     Essential hypertension     GERD (gastroesophageal reflux disease)     Acute blood loss anemia     Tinea corporis     Carotid stenosis     CAD (coronary artery disease)     S/P CABG x 5     Lower GI bleeding     Hip fracture, right (HCC)     Acute right hip pain     Acute low back pain without sciatica     Hypothermia     Complicated UTI (urinary tract infection)     Edema     Problem with urinary catheter ([facility])     Acute encephalopathy     Chronic indwelling Iglesias catheter     Hyperlipidemia     Bilateral lower leg cellulitis     Neurogenic bladder     Bacteriuria     Abnormality of urethral meatus     Gross hematuria     CHF with unknown LVEF (HCC)     Dyspnea     Bradycardia     Pseudomonas infection     Acute renal injury ([address]     Cellulitis of scrotum     Constipation     Encopresis with constipation and overflow incontinence     Abnormal stress test     H/O angiography     Abnormal angiogram     Acute cystitis with hematuria     Acute renal failure superimposed on stage 3 chronic kidney disease (HCC)     Urinary tract infection associated with indwelling urethral catheter (HCC)     Encephalopathy acute     Altered mental status     Hyperkalemia     Leg laceration, unspecified laterality, initial encounter     Pain of right lower extremity     Degloving injury     Weakness     Symptomatic bradycardia     Bilateral leg edema     Symptomatic sinus bradycardia        Assessment & Plan:    1. Hypothermia  2. Fall  3. SB  4. Flu    Chelo Boone is a [de-identified] y.o. man w/ PMH HTN, HLD, GERD, carotid stenosis, CAD, s/p CABG (2013), MPI (2020), follows with Dr. Jonn Rios, who presented (1/18/22) to the hospital with fatigue, weakness, fall, EKG significant for SB with HR 46, HR was noted to be in the 30s overnight with a single pause lasting 2.5 seconds, patient hypothermic, HR improved w/ kole ramos, who p/w a fall, found to be bradycardia, hypothermic, + flu    SB  - Currently NSR, bradycardic overnight w/ rates as low as 49  - Keep on tele  - Avoid AVN agents  - TSH 5/40 (1/27/22)  - Temperature improving overnight, remains on kole hugger  - Discussed with patient implantable loop recorder (ILR) for long-term cardiac dysrhythmia monitoring in order to evaluate bradycardia, rhythm abnormalities. The benefits and risks of implanting an implantable loop recorder has been discussed with the patient. The potential risks include, but are not limited to, bleeding, trauma, infection.  Pt w/ poor mentation at this point and is Flu + , will revisit ILR when his status improves    Electronically signed by TIM Wilder - CNP on 1/28/22 at 8:22 AM EST    David 82    I spent a total of 40 minutes and greater than 50% of the time was spent counseling with Gigi Decker and coordinating care regarding her diagnosis, treatments and plan of care.

## 2022-01-29 LAB
ANION GAP SERPL CALCULATED.3IONS-SCNC: 8 MMOL/L (ref 3–16)
BASOPHILS ABSOLUTE: 0 K/UL (ref 0–0.2)
BASOPHILS RELATIVE PERCENT: 0.1 %
BUN BLDV-MCNC: 42 MG/DL (ref 7–20)
CALCIUM SERPL-MCNC: 8.7 MG/DL (ref 8.3–10.6)
CHLORIDE BLD-SCNC: 113 MMOL/L (ref 99–110)
CO2: 26 MMOL/L (ref 21–32)
CREAT SERPL-MCNC: 1.5 MG/DL (ref 0.8–1.3)
EOSINOPHILS ABSOLUTE: 0 K/UL (ref 0–0.6)
EOSINOPHILS RELATIVE PERCENT: 0.2 %
GFR AFRICAN AMERICAN: 54
GFR NON-AFRICAN AMERICAN: 45
GLUCOSE BLD-MCNC: 85 MG/DL (ref 70–99)
HCT VFR BLD CALC: 27.4 % (ref 40.5–52.5)
HEMOGLOBIN: 8.7 G/DL (ref 13.5–17.5)
LYMPHOCYTES ABSOLUTE: 1 K/UL (ref 1–5.1)
LYMPHOCYTES RELATIVE PERCENT: 7.9 %
MCH RBC QN AUTO: 28.2 PG (ref 26–34)
MCHC RBC AUTO-ENTMCNC: 31.8 G/DL (ref 31–36)
MCV RBC AUTO: 88.7 FL (ref 80–100)
MONOCYTES ABSOLUTE: 0.9 K/UL (ref 0–1.3)
MONOCYTES RELATIVE PERCENT: 6.8 %
NEUTROPHILS ABSOLUTE: 11 K/UL (ref 1.7–7.7)
NEUTROPHILS RELATIVE PERCENT: 85 %
PDW BLD-RTO: 18 % (ref 12.4–15.4)
PLATELET # BLD: 125 K/UL (ref 135–450)
PMV BLD AUTO: 9.1 FL (ref 5–10.5)
POTASSIUM REFLEX MAGNESIUM: 4.6 MMOL/L (ref 3.5–5.1)
RBC # BLD: 3.1 M/UL (ref 4.2–5.9)
REPORT: NORMAL
SODIUM BLD-SCNC: 147 MMOL/L (ref 136–145)
WBC # BLD: 12.9 K/UL (ref 4–11)

## 2022-01-29 PROCEDURE — 6360000002 HC RX W HCPCS: Performed by: STUDENT IN AN ORGANIZED HEALTH CARE EDUCATION/TRAINING PROGRAM

## 2022-01-29 PROCEDURE — 2580000003 HC RX 258

## 2022-01-29 PROCEDURE — 94761 N-INVAS EAR/PLS OXIMETRY MLT: CPT

## 2022-01-29 PROCEDURE — 6360000002 HC RX W HCPCS

## 2022-01-29 PROCEDURE — 2580000003 HC RX 258: Performed by: STUDENT IN AN ORGANIZED HEALTH CARE EDUCATION/TRAINING PROGRAM

## 2022-01-29 PROCEDURE — 94640 AIRWAY INHALATION TREATMENT: CPT

## 2022-01-29 PROCEDURE — 85025 COMPLETE CBC W/AUTO DIFF WBC: CPT

## 2022-01-29 PROCEDURE — 6370000000 HC RX 637 (ALT 250 FOR IP)

## 2022-01-29 PROCEDURE — 92610 EVALUATE SWALLOWING FUNCTION: CPT

## 2022-01-29 PROCEDURE — A4216 STERILE WATER/SALINE, 10 ML: HCPCS | Performed by: STUDENT IN AN ORGANIZED HEALTH CARE EDUCATION/TRAINING PROGRAM

## 2022-01-29 PROCEDURE — 1200000000 HC SEMI PRIVATE

## 2022-01-29 PROCEDURE — 80048 BASIC METABOLIC PNL TOTAL CA: CPT

## 2022-01-29 PROCEDURE — 6370000000 HC RX 637 (ALT 250 FOR IP): Performed by: STUDENT IN AN ORGANIZED HEALTH CARE EDUCATION/TRAINING PROGRAM

## 2022-01-29 PROCEDURE — 2500000003 HC RX 250 WO HCPCS: Performed by: STUDENT IN AN ORGANIZED HEALTH CARE EDUCATION/TRAINING PROGRAM

## 2022-01-29 PROCEDURE — 36415 COLL VENOUS BLD VENIPUNCTURE: CPT

## 2022-01-29 PROCEDURE — 99232 SBSQ HOSP IP/OBS MODERATE 35: CPT | Performed by: INTERNAL MEDICINE

## 2022-01-29 RX ADMIN — AMPICILLIN AND SULBACTAM 1500 MG: 1; .5 INJECTION, POWDER, FOR SOLUTION INTRAMUSCULAR; INTRAVENOUS at 04:35

## 2022-01-29 RX ADMIN — SODIUM CHLORIDE, PRESERVATIVE FREE 5 ML: 5 INJECTION INTRAVENOUS at 10:10

## 2022-01-29 RX ADMIN — AMPICILLIN AND SULBACTAM 1500 MG: 1; .5 INJECTION, POWDER, FOR SOLUTION INTRAMUSCULAR; INTRAVENOUS at 15:20

## 2022-01-29 RX ADMIN — SODIUM CHLORIDE 25 ML: 900 INJECTION INTRAVENOUS at 21:49

## 2022-01-29 RX ADMIN — AMPICILLIN AND SULBACTAM 1500 MG: 1; .5 INJECTION, POWDER, FOR SOLUTION INTRAMUSCULAR; INTRAVENOUS at 10:07

## 2022-01-29 RX ADMIN — ATORVASTATIN CALCIUM 80 MG: 40 TABLET, FILM COATED ORAL at 21:37

## 2022-01-29 RX ADMIN — AMPICILLIN AND SULBACTAM 1500 MG: 1; .5 INJECTION, POWDER, FOR SOLUTION INTRAMUSCULAR; INTRAVENOUS at 21:50

## 2022-01-29 RX ADMIN — IPRATROPIUM BROMIDE AND ALBUTEROL SULFATE 1 AMPULE: 2.5; .5 SOLUTION RESPIRATORY (INHALATION) at 11:31

## 2022-01-29 RX ADMIN — ENOXAPARIN SODIUM 40 MG: 100 INJECTION SUBCUTANEOUS at 09:49

## 2022-01-29 RX ADMIN — SODIUM CHLORIDE 25 ML: 900 INJECTION INTRAVENOUS at 15:19

## 2022-01-29 RX ADMIN — OSELTAMIVIR PHOSPHATE 30 MG: 30 CAPSULE ORAL at 21:41

## 2022-01-29 RX ADMIN — SODIUM CHLORIDE 25 ML: 900 INJECTION INTRAVENOUS at 10:05

## 2022-01-29 RX ADMIN — SODIUM CHLORIDE, POTASSIUM CHLORIDE, SODIUM LACTATE AND CALCIUM CHLORIDE: 600; 310; 30; 20 INJECTION, SOLUTION INTRAVENOUS at 09:58

## 2022-01-29 RX ADMIN — LEVOTHYROXINE SODIUM ANHYDROUS 56 MCG: 100 INJECTION, POWDER, LYOPHILIZED, FOR SOLUTION INTRAVENOUS at 09:52

## 2022-01-29 RX ADMIN — IPRATROPIUM BROMIDE AND ALBUTEROL SULFATE 1 AMPULE: 2.5; .5 SOLUTION RESPIRATORY (INHALATION) at 21:50

## 2022-01-29 RX ADMIN — IPRATROPIUM BROMIDE AND ALBUTEROL SULFATE 1 AMPULE: 2.5; .5 SOLUTION RESPIRATORY (INHALATION) at 07:55

## 2022-01-29 RX ADMIN — DOCUSATE SODIUM 200 MG: 100 CAPSULE, LIQUID FILLED ORAL at 21:37

## 2022-01-29 RX ADMIN — SODIUM CHLORIDE, POTASSIUM CHLORIDE, SODIUM LACTATE AND CALCIUM CHLORIDE: 600; 310; 30; 20 INJECTION, SOLUTION INTRAVENOUS at 23:30

## 2022-01-29 RX ADMIN — GABAPENTIN 600 MG: 600 TABLET, FILM COATED ORAL at 21:37

## 2022-01-29 RX ADMIN — IPRATROPIUM BROMIDE AND ALBUTEROL SULFATE 1 AMPULE: 2.5; .5 SOLUTION RESPIRATORY (INHALATION) at 16:13

## 2022-01-29 RX ADMIN — BISACODYL 10 MG: 10 SUPPOSITORY RECTAL at 09:50

## 2022-01-29 RX ADMIN — SODIUM CHLORIDE, PRESERVATIVE FREE 10 ML: 5 INJECTION INTRAVENOUS at 09:55

## 2022-01-29 ASSESSMENT — ENCOUNTER SYMPTOMS
COUGH: 0
CONSTIPATION: 1
NAUSEA: 0
ABDOMINAL DISTENTION: 1
VOMITING: 0
CHEST TIGHTNESS: 0
DIARRHEA: 0
ABDOMINAL PAIN: 0
SHORTNESS OF BREATH: 0

## 2022-01-29 ASSESSMENT — PAIN SCALES - GENERAL
PAINLEVEL_OUTOF10: 0

## 2022-01-29 NOTE — PROGRESS NOTES
mL/hr at 01/28/22 1752    sodium chloride       PRN Meds:menthol-zinc oxide, sodium chloride (Inhalant), melatonin, dicyclomine, albuterol, guaiFENesin, sodium chloride flush, sodium chloride, ondansetron **OR** ondansetron, polyethylene glycol, acetaminophen **OR** acetaminophen    Objective:   Vitals:   T-max:  Patient Vitals for the past 8 hrs:   BP Temp Temp src Pulse Resp SpO2 Weight   01/29/22 0757      99 %    01/29/22 0731 (!) 114/56  Axillary 61 12 98 %    01/29/22 0456       205 lb 7.5 oz (93.2 kg)   01/29/22 0421 (!) 109/52 97.9 °F (36.6 °C) Axillary 67 18 96 %        Intake/Output Summary (Last 24 hours) at 1/29/2022 0950  Last data filed at 1/29/2022 0731  Gross per 24 hour   Intake 0 ml   Output 1100 ml   Net -1100 ml       Review of Systems   Constitutional: Positive for chills and fatigue. Negative for fever. Respiratory: Negative for cough, chest tightness and shortness of breath. Cardiovascular: Positive for palpitations. Negative for chest pain and leg swelling. Gastrointestinal: Positive for abdominal distention and constipation. Negative for abdominal pain, diarrhea, nausea and vomiting. Genitourinary: Negative for difficulty urinating. Neurological: Positive for dizziness, weakness and light-headedness. Physical Exam  Constitutional:       General: He is not in acute distress. Appearance: Normal appearance. HENT:      Head: Normocephalic and atraumatic. Nose: Nose normal.   Eyes:      Conjunctiva/sclera: Conjunctivae normal.      Pupils: Pupils are equal, round, and reactive to light. Cardiovascular:      Rate and Rhythm: Regular rhythm. Bradycardia present. Pulses: Normal pulses. Heart sounds: No murmur heard. No friction rub. No gallop. Pulmonary:      Effort: Pulmonary effort is normal. No respiratory distress. Breath sounds: Normal breath sounds. No wheezing. Abdominal:      General: Abdomen is flat.  Bowel sounds are normal. There is distension. Hernia: A hernia is present. Musculoskeletal:         General: Swelling and tenderness present. Cervical back: Normal range of motion. Skin:     General: Skin is warm and dry. Neurological:      Mental Status: He is alert. Cranial Nerves: No cranial nerve deficit. Sensory: Sensation is intact. Comments: Oriented to person and place not time         LABS:    CBC:   Recent Labs     01/27/22  0545 01/28/22  0600 01/29/22  0725   WBC 4.8 7.3 12.9*   HGB 8.6* 8.5* 8.7*   HCT 26.9* 27.0* 27.4*   * 119* 125*   MCV 88.8 90.0 88.7     Renal:    Recent Labs     01/26/22 2056 01/27/22 0455 01/28/22  0600 01/29/22  0725   *  --  143 147*   K 4.7  --  4.6 4.6     --  111* 113*   CO2 28  --  26 26   BUN 44*  --  40* 42*   CREATININE 1.3  --  1.4* 1.5*   GLUCOSE 91  --  74 85   CALCIUM 9.3  --  8.4 8.7   MG  --  2.60*  --   --    ANIONGAP 10  --  6 8     Hepatic:   Recent Labs     01/26/22 2056   AST 30   ALT 30   BILITOT 0.4   PROT 7.8   LABALBU 4.0   ALKPHOS 168*     Troponin:   Recent Labs     01/26/22 2056 01/26/22  2159 01/28/22  0600   TROPONINI 0.06* 0.05* 0.11*     BNP: No results for input(s): BNP in the last 72 hours. Lipids: No results for input(s): CHOL, HDL in the last 72 hours. Invalid input(s): LDLCALCU, TRIGLYCERIDE  ABGs:    Recent Labs     01/28/22  1458   PHART 7.391   RRP7DPV 41.1   PO2ART 72.4*   HKH5YMG 25.0   BEART 0   D2PJELGS 94   JMZ0YBA 26       INR: No results for input(s): INR in the last 72 hours. Lactate:   Recent Labs     01/28/22  1458   LACTATE 0.58     Cultures:  -----------------------------------------------------------------  RAD:   XR CHEST PORTABLE   Final Result      1. Stable left basilar pleural-parenchymal opacities and bilateral interstitial opacities. CT CHEST ABDOMEN PELVIS W CONTRAST   Final Result     Findings likely consistent with stercoral proctitis. Cystitis    also suspected. _______________________________________________       IMPRESSION:          Masslike consolidation of the left lung base which could be    secondary to a pneumonia though underlying neoplasm considered. Nearby pleural fluid collection concerning for an empyema. XR CHEST PORTABLE   Final Result      1. Decreased bilateral interstitial opacities which may represent improved interstitial edema. 2.  Mild cardiomegaly. 3.  Chronic left basilar pleural and parenchymal opacities, unchanged         XR TIBIA FIBULA RIGHT (2 VIEWS)   Final Result      Pelvis and right hip:   1. Diffuse demineralization limits evaluation. No evidence of acute fracture. 2.  Status post right femur ORIF without evidence of hardware complication. Right tib-fib:   1. No acute osseous findings. XR HIP 2-3 VW W PELVIS RIGHT   Final Result      Pelvis and right hip:   1. Diffuse demineralization limits evaluation. No evidence of acute fracture. 2.  Status post right femur ORIF without evidence of hardware complication. Right tib-fib:   1. No acute osseous findings. CT Head WO Contrast   Final Result      Head   1. No evidence acute intracranial hemorrhage or mass effect. 2.  Mild to moderate atrophy. Cervical spine   1. No evidence of acute fracture or malalignment. 2.  Moderate multilevel cervical spondylosis. CT Cervical Spine WO Contrast   Final Result      Head   1. No evidence acute intracranial hemorrhage or mass effect. 2.  Mild to moderate atrophy. Cervical spine   1. No evidence of acute fracture or malalignment. 2.  Moderate multilevel cervical spondylosis. XR HIP 2-3 VW W PELVIS RIGHT    (Results Pending)       Assessment/Plan:     1. Symptomatic Bradycardia likely 2/2 Hypothermia  Patient was found down for unknown amount of time. Patient has previous hx of bradycardia with HR down to 30s in previous hospitalizations.  Was supposed to have a CAM monitor placed which wasn't placed. Patient is not on any remington agents. Bradycardia is improving with improvements in body temperature. - On telemetry   - Omaira ramos   - [id number] cardiology, appreciate recs     2. Hypothermia 2/2 Hypothyroidism or Anemia vs Influenza    Patient was hypothermic on arrival with T 90.3. He reports fatigue, constipation, and difficulty concentrating. TSH 5.19 in 1/18/2022 with normal T4. Denies weight gain, reporting weight loss. CBC showed signs of anemia with Hgb of 8.6 and Hct 26.9. Had fall with unknown fall time. Currently T 97.9.  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 Rule out Sepsis   Patient was hypothermic on arrival with bradycardia and intermittently confused. - Procalcitonin 0.12  - Blood cultures show NGTD     4. Cystitis  UA on 1/17 was cloudy with 10-20 WBC and moderate leukocyte esterase. UA today showed 3+ bacteria and trace leukocyte esterase. CT Abd/Pelvis showed thickening of the bladder consistent with possible cystitis. Patient has chronic schneider catheter which was replaced 10 days ago. - Replace schneider catheter  - Urine cultures grew E. Fecalis and Trichosporin   - Unasyn 1.5 mg IV every 6 hours     5. Rhabdomyolysis   - CK normal at 225, trend  - s/p IV fluids    6. Troponinemia  - Troponin 0.06 on arrival, now 0.05, stop trending    7. Constipation  Patient reports that he has been constipated for 2 weeks. - Home meds  - started on dulcolax suppository   - Continue to monitor    8. 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 Koki Parnell care following     9. BL LE Neuropathy  - Continue Gabapentin    10. Concern for malignancy  - Patient has a smoking history. Denies any alcohol use. Reports anorexia and recent weight loss. He denies hemoptysis currently.   - CT Abd/Pelvis/Chest showed left lower lobe consolidation suspicious of pneumonia vs malignancy  -consulted pulmonology, will likely need a repeat scan in 3 months       Code Status: Full code  FEN: Diet NPO  PPX: Lovenox  DISPO: Simran Hatfield, PGY-1   01/29/22  9:50 AM    This patient has been staffed and discussed with Mulugeta Staley MD.

## 2022-01-29 NOTE — PROGRESS NOTES
SLP was able to see patient at end of day. She  agreed with MD diet order of small bite sized dysphagia diet. Ordered placed.   Electronically signed by Manju Nobles RN on 1/29/2022 at 6:38 PM

## 2022-01-29 NOTE — PLAN OF CARE
Problem: Falls - Risk of:  Goal: Will remain free from falls  Description: Will remain free from falls  Outcome: Ongoing  Goal: Absence of physical injury  Description: Absence of physical injury  Outcome: Ongoing     Problem: Skin Integrity:  Goal: Will show no infection signs and symptoms  Description: Will show no infection signs and symptoms  Outcome: Ongoing  Goal: Absence of new skin breakdown  Description: Absence of new skin breakdown  Outcome: Ongoing     Problem: Pain:  Goal: Pain level will decrease  Description: Pain level will decrease  Outcome: Ongoing  Goal: Control of acute pain  Description: Control of acute pain  Outcome: Ongoing  Goal: Control of chronic pain  Description: Control of chronic pain  Outcome: Ongoing     Problem: Nutrition  Goal: Optimal nutrition therapy  1/29/2022 0326 by Farhat Stoddard RN  Outcome: Ongoing  1/28/2022 1607 by Nallely Rojas RN  Outcome: Not Met This Shift

## 2022-01-29 NOTE — PROGRESS NOTES
Progress Note    Admit Date: 1/26/2022  Day: 2  Diet: ADULT DIET; Dysphagia - Soft and Bite Sized; Low Sodium (2 gm)  ADULT ORAL NUTRITION SUPPLEMENT; Dinner, Breakfast; Wound Healing Oral Supplement  ADULT ORAL NUTRITION SUPPLEMENT; Breakfast, Lunch; Standard High Calorie/High Protein Oral Supplement    CC: fall    Interval history: Patient was less responsive last afternoon and had a drop in O2 saturation. A stat EKG and chest Xray was ordered which were stable. Patient was also having a weak cough with concerns of aspiration, respiratory therapist and Dr Chris Ambrzi was at bedside with improvement in patients status thereafter following deep suctioning. Palliative care was consulted to discuss goals of care and code status with family at bedside and POA. Family decided to change the code status to [facility] for now and will update this evening after thorough discussion with family if they would want intubation or not. No acute events overnight. Patient remained afebrile overnight with stable vitals. Patient remains on 2L of O2 and maintaining saturation above 95%. Patient was seen at bedside this morning, he is much more alert and responding to all questions asked. He was feeling hungry and wanted to eat.      Medications:     Scheduled Meds:   ampicillin-sulbactam  1,500 mg IntraVENous Q6H    ipratropium-albuterol  1 ampule Inhalation Q4H WA    levothyroxine  56 mcg IntraVENous Daily    And    sodium chloride (PF)  5 mL IntraVENous Daily    aspirin  81 mg Oral Daily    pantoprazole  40 mg Oral QAM AC    vitamin B-12  1,000 mcg Oral Daily    gabapentin  600 mg Oral BID    atorvastatin  80 mg Oral Nightly    sodium chloride flush  5-40 mL IntraVENous 2 times per day    enoxaparin  40 mg SubCUTAneous Daily    docusate sodium  200 mg Oral BID    bisacodyl  10 mg Rectal Daily    oseltamivir  30 mg Oral BID     Continuous Infusions:   lactated ringers 75 mL/hr at 01/29/22 0933    sodium chloride       PRN Meds:menthol-zinc oxide, sodium chloride (Inhalant), melatonin, dicyclomine, albuterol, guaiFENesin, sodium chloride flush, sodium chloride, ondansetron **OR** ondansetron, polyethylene glycol, acetaminophen **OR** acetaminophen    Objective:   Vitals:   T-max:  Patient Vitals for the past 8 hrs:   BP Temp Temp src Pulse Resp SpO2 Weight   01/29/22 0757      99 %    01/29/22 0731 (!) 114/56  Axillary 61 12 98 %    01/29/22 0456       205 lb 7.5 oz (93.2 kg)   01/29/22 0421 (!) 109/52 97.9 °F (36.6 °C) Axillary 67 18 96 %        Intake/Output Summary (Last 24 hours) at 1/29/2022 0959  Last data filed at 1/29/2022 0731  Gross per 24 hour   Intake 0 ml   Output 1100 ml   Net -1100 ml       Review of Systems   Constitutional: Positive for chills and fatigue. Negative for fever. Respiratory: Negative for cough, chest tightness and shortness of breath. Cardiovascular: Positive for palpitations. Negative for chest pain and leg swelling. Gastrointestinal: Positive for abdominal distention and constipation. Negative for abdominal pain, diarrhea, nausea and vomiting. Genitourinary: Negative for difficulty urinating. Neurological: Positive for dizziness, weakness and light-headedness. Physical Exam  Constitutional:       General: He is not in acute distress. Appearance: Normal appearance. HENT:      Head: Normocephalic and atraumatic. Nose: Nose normal.   Eyes:      Conjunctiva/sclera: Conjunctivae normal.      Pupils: Pupils are equal, round, and reactive to light. Cardiovascular:      Rate and Rhythm: Regular rhythm. Bradycardia present. Pulses: Normal pulses. Heart sounds: No murmur heard. No friction rub. No gallop. Pulmonary:      Effort: Pulmonary effort is normal. No respiratory distress. Breath sounds: Normal breath sounds. No wheezing. Abdominal:      General: Abdomen is flat. Bowel sounds are normal. There is distension.       Hernia: A hernia is present. Musculoskeletal:         General: Swelling and tenderness present. Cervical back: Normal range of motion. Skin:     General: Skin is warm and dry. Neurological:      Mental Status: He is alert. Cranial Nerves: No cranial nerve deficit. Sensory: Sensation is intact. Comments: Oriented to person and place not time         LABS:    CBC:   Recent Labs     01/27/22  0545 01/28/22  0600 01/29/22  0725   WBC 4.8 7.3 12.9*   HGB 8.6* 8.5* 8.7*   HCT 26.9* 27.0* 27.4*   * 119* 125*   MCV 88.8 90.0 88.7     Renal:    Recent Labs     01/26/22 2056 01/27/22  0455 01/28/22  0600 01/29/22  0725   *  --  143 147*   K 4.7  --  4.6 4.6     --  111* 113*   CO2 28  --  26 26   BUN 44*  --  40* 42*   CREATININE 1.3  --  1.4* 1.5*   GLUCOSE 91  --  74 85   CALCIUM 9.3  --  8.4 8.7   MG  --  2.60*  --   --    ANIONGAP 10  --  6 8     Hepatic:   Recent Labs     01/26/22 2056   AST 30   ALT 30   BILITOT 0.4   PROT 7.8   LABALBU 4.0   ALKPHOS 168*     Troponin:   Recent Labs     01/26/22 2056 01/26/22  2159 01/28/22  0600   TROPONINI 0.06* 0.05* 0.11*     BNP: No results for input(s): BNP in the last 72 hours. Lipids: No results for input(s): CHOL, HDL in the last 72 hours. Invalid input(s): LDLCALCU, TRIGLYCERIDE  ABGs:    Recent Labs     01/28/22  1458   PHART 7.391   PFI5KOV 41.1   PO2ART 72.4*   EJL3QYF 25.0   BEART 0   A2DLQLLW 94   YPS4MXX 26       INR: No results for input(s): INR in the last 72 hours. Lactate:   Recent Labs     01/28/22  1458   LACTATE 0.58     Cultures:  -----------------------------------------------------------------  RAD:   XR CHEST PORTABLE   Final Result      1. Stable left basilar pleural-parenchymal opacities and bilateral interstitial opacities. CT CHEST ABDOMEN PELVIS W CONTRAST   Final Result     Findings likely consistent with stercoral proctitis.   Cystitis    also suspected.       _______________________________________________ IMPRESSION:          Masslike consolidation of the left lung base which could be    secondary to a pneumonia though underlying neoplasm considered. Nearby pleural fluid collection concerning for an empyema. XR CHEST PORTABLE   Final Result      1. Decreased bilateral interstitial opacities which may represent improved interstitial edema. 2.  Mild cardiomegaly. 3.  Chronic left basilar pleural and parenchymal opacities, unchanged         XR TIBIA FIBULA RIGHT (2 VIEWS)   Final Result      Pelvis and right hip:   1. Diffuse demineralization limits evaluation. No evidence of acute fracture. 2.  Status post right femur ORIF without evidence of hardware complication. Right tib-fib:   1. No acute osseous findings. XR HIP 2-3 VW W PELVIS RIGHT   Final Result      Pelvis and right hip:   1. Diffuse demineralization limits evaluation. No evidence of acute fracture. 2.  Status post right femur ORIF without evidence of hardware complication. Right tib-fib:   1. No acute osseous findings. CT Head WO Contrast   Final Result      Head   1. No evidence acute intracranial hemorrhage or mass effect. 2.  Mild to moderate atrophy. Cervical spine   1. No evidence of acute fracture or malalignment. 2.  Moderate multilevel cervical spondylosis. CT Cervical Spine WO Contrast   Final Result      Head   1. No evidence acute intracranial hemorrhage or mass effect. 2.  Mild to moderate atrophy. Cervical spine   1. No evidence of acute fracture or malalignment. 2.  Moderate multilevel cervical spondylosis. XR HIP 2-3 VW W PELVIS RIGHT    (Results Pending)       Assessment/Plan:     1. Symptomatic Bradycardia likely 2/2 Hypothermia, resolved   Patient was found down for unknown amount of time. Patient has previous hx of bradycardia with HR down to 30s in previous hospitalizations. Was supposed to have a CAM monitor placed which wasn't placed. Patient is not on any remington agents. Bradycardia is improving with improvements in body temperature. - On telemetry   - Omaira ramos     2. Hypothermia, resolved  Likely 2/2 Hypothyroidism or Anemia vs Influenza   Patient was hypothermic on arrival with T 90.3. He reports fatigue, constipation, and difficulty concentrating. TSH 5.19 in 1/18/2022 with normal T4. Denies weight gain, reporting weight loss. CBC showed signs of anemia with Hgb of 8.6 and Hct 26.9. Had fall with unknown fall time. Currently T 97.9.  - TSH 5.4 with reflex to T4 1, started on synthyroid 56 mcg  - Normal cortisol levels, recent 12   - Influenza B positive, continue Tamiflu 30 mg BID    3. Altered mental status, resolved   Patient was less responsive last afternoon and had a drop in O2 saturation. A stat EKG and chest Xray was ordered which were stable. Patient was also having a weak cough with concerns of aspiration, respiratory therapist and Dr Paola James was at bedside with improvement in patients status thereafter following deep suctioning  -s/p passed bedside swallow this morning   -started on dysphagia diet as per recommendations   -on fluids LR @ 75 ml/hr     4. Rule out Sepsis   Patient was hypothermic on arrival with bradycardia and intermittently confused. - Procalcitonin 0.12  - Blood cultures show NGTD     5. Cystitis  UA on 1/17 was cloudy with 10-20 WBC and moderate leukocyte esterase. UA today showed 3+ bacteria and trace leukocyte esterase. CT Abd/Pelvis showed thickening of the bladder consistent with possible cystitis. Patient has chronic schneider catheter which was replaced 10 days ago. - Replaced schneider catheter  - urine cultures positive for Trichosporon and E fecalis, started on Unasyn 1.5 g I/V every 6 hrs     6. Troponinemia  - Troponin 0.05 on arrival, recent 0.11, s/p EKG which showed no new changes from prior    7. Constipation  Patient reports that he has been constipated for 2 weeks.   - Home meds  - started on dulcolax suppository   - Continue to monitor    8. 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 Pelletier [facility] following     9. BL LE Neuropathy  - Continue Gabapentin    10. Concern for malignancy  - Patient has a smoking history. Denies any alcohol use. Reports anorexia and recent weight loss. He denies hemoptysis currently. - CT Abd/Pelvis/Chest showed left lower lobe consolidation suspicious of pneumonia vs malignancy  -consulted pulmonology, will likely need a repeat scan in 3 months       Code Status: DNR-CCA   FEN: ADULT DIET; Dysphagia - Soft and Bite Sized;  Low Sodium (2 gm)  ADULT ORAL NUTRITION SUPPLEMENT; Dinner, Breakfast; Wound Healing Oral Supplement  ADULT ORAL NUTRITION SUPPLEMENT; Breakfast, Lunch; Standard High Calorie/High Protein Oral Supplement  PPX: Lovenox  DISPO: Berto Membreno MD, PGY-1   01/29/22  9:59 AM    This patient has been staffed and discussed with Imani Melo MD.

## 2022-01-29 NOTE — PLAN OF CARE
Problem: Nutrition  Intervention: Swallowing evaluation  SLP completed evaluation. Please refer to notes in EMR.

## 2022-01-29 NOTE — PROGRESS NOTES
[facility]   Cardiac Electrophysiology Progress Note     Admit Date: 2022     Reason for follow up: Sinus bradycardia    HPI and Interval History:   Patient seen and examined. Clinical notes reviewed. Telemetry reviewed. Patient remains in sinus bradycardia  No major events overnight. Patient is alert and answering to simple questions  Denies any syncopal episodes    Review of System:  All other systems reviewed except for that noted above. Pertinent negatives and positives are:     · General: negative for fever, chills   · Ophthalmic ROS: negative for - eye pain or loss of vision  · ENT ROS: negative for - headaches, sore throat   · Respiratory: negative for - cough, sputum  · Cardiovascular: Reviewed in HPI  · Gastrointestinal: negative for - abdominal pain, diarrhea, N/V  · Hematology: negative for - bleeding, blood clots, bruising or jaundice  · Genito-Urinary:  negative for - Dysuria or incontinence  · Musculoskeletal: negative for - Joint swelling, muscle pain  · Neurological: negative for - confusion, dizziness, headaches   · Psychiatric: No anxiety, no depression. · Dermatological: negative for - rash      Physical Examination:  Vitals:    22 1143   BP: (!) 125/59   Pulse: 65   Resp: 12   Temp: 98 °F (36.7 °C)   SpO2: 98%        Intake/Output Summary (Last 24 hours) at 2022 1509  Last data filed at 2022 1401  Gross per 24 hour   Intake 0 ml   Output 1450 ml   Net -1450 ml     In: 0   Out: 1450    Wt Readings from Last 3 Encounters:   22 205 lb 7.5 oz (93.2 kg)   22 207 lb 3.7 oz (94 kg)   21 185 lb (83.9 kg)     Temp  Av.6 °F (37 °C)  Min: 97.9 °F (36.6 °C)  Max: 100.5 °F (38.1 °C)  Pulse  Av.2  Min: 61  Max: 94  BP  Min: 109/52  Max: 125/59  SpO2  Av.3 %  Min: 95 %  Max: 99 %    · Telemetry: Sinus bradycardia  · Constitutional: Alert. Oriented to person, place, and time. No distress. · Head: Normocephalic and atraumatic. · Mouth/Throat: Lips appear moist. Oropharynx is clear and moist.  · Eyes: Conjunctivae normal. EOM are normal.   · Neck: Neck supple. No lymphadenopathy. No rigidity. No JVD present. · Cardiovascular: Normal rate, regular rhythm. Normal S1&S2. Carotid pulse 2+ bilaterally. · Pulmonary/Chest: Bilateral respiratory sounds present. No respiratory accessory muscle use. No wheezes, No rhonchi. · Abdominal: Soft. Normal bowel sounds present. No distension, No tenderness. No splenomegaly. No hernia. · Musculoskeletal: No tenderness. No edema    · Lymphadenopathy: Has no cervical adenopathy. · Neurological: Alert and oriented. Cranial nerve II-XII grossly intact, No gross deficit to touch. · Skin: Skin is warm and dry. No rash, lesions, ulcerations noted. · Psychiatric: No anxiety nor agitation. Labs, diagnostic and imaging results reviewed. Reviewed. Recent Labs     01/26/22 2056 01/28/22  0600 01/29/22  0725   * 143 147*   K 4.7 4.6 4.6    111* 113*   CO2 28 26 26   BUN 44* 40* 42*   CREATININE 1.3 1.4* 1.5*     Recent Labs     01/27/22  0545 01/28/22  0600 01/29/22  0725   WBC 4.8 7.3 12.9*   HGB 8.6* 8.5* 8.7*   HCT 26.9* 27.0* 27.4*   MCV 88.8 90.0 88.7   * 119* 125*     Lab Results   Component Value Date    CKTOTAL 225 01/26/2022    TROPONINI 0.11 01/28/2022     Estimated Creatinine Clearance: 46 mL/min (A) (based on SCr of 1.5 mg/dL (H)).    No results found for: BNP  Lab Results   Component Value Date    PROTIME 11.0 10/31/2021    PROTIME 14.2 04/22/2021    PROTIME 14.1 02/14/2021    INR 0.97 10/31/2021    INR 1.22 04/22/2021    INR 1.21 02/14/2021     Lab Results   Component Value Date    CHOL 102 06/05/2021    HDL 43 06/05/2021    TRIG 54 06/05/2021       Scheduled Meds:   ampicillin-sulbactam  1,500 mg IntraVENous Q6H    ipratropium-albuterol  1 ampule Inhalation Q4H WA    levothyroxine  56 mcg IntraVENous Daily    And    sodium chloride (PF)  5 mL IntraVENous Daily    aspirin  81 mg Oral Daily    pantoprazole  40 mg Oral QAM AC    vitamin B-12  1,000 mcg Oral Daily    gabapentin  600 mg Oral BID    atorvastatin  80 mg Oral Nightly    sodium chloride flush  5-40 mL IntraVENous 2 times per day    enoxaparin  40 mg SubCUTAneous Daily    docusate sodium  200 mg Oral BID    bisacodyl  10 mg Rectal Daily    oseltamivir  30 mg Oral BID     Continuous Infusions:   lactated ringers 75 mL/hr at 01/29/22 0958    sodium chloride 25 mL (01/29/22 1005)     PRN Meds:menthol-zinc oxide, sodium chloride (Inhalant), melatonin, dicyclomine, albuterol, guaiFENesin, sodium chloride flush, sodium chloride, ondansetron **OR** ondansetron, polyethylene glycol, acetaminophen **OR** acetaminophen     Patient Active Problem List    Diagnosis Date Noted    Symptomatic sinus bradycardia 01/27/2022    Bilateral leg edema 01/20/2022    Symptomatic bradycardia     Weakness 01/17/2022    Pain of right lower extremity     Degloving injury     Leg laceration, unspecified laterality, initial encounter 10/31/2021    Altered mental status     Hyperkalemia     Encephalopathy acute 04/21/2021    Acute renal failure superimposed on stage 3 chronic kidney disease ([address] 02/15/2021    Urinary tract infection associated with indwelling urethral catheter ([facility] 02/15/2021    Acute cystitis with hematuria 02/14/2021    Abnormal angiogram 08/27/2020    H/O angiography 08/20/2020    Abnormal stress test 08/18/2020    Constipation 06/09/2020    Encopresis with constipation and overflow incontinence 06/09/2020    Cellulitis of scrotum 05/02/2020    Acute renal injury ([address] 03/17/2020    Pseudomonas infection 02/06/2020    Dyspnea     Bradycardia     CHF with unknown LVEF ([facility] 01/13/2020    Gross hematuria 12/17/2019    Chronic indwelling Iglesias catheter 11/20/2019    Hyperlipidemia 11/20/2019    Bilateral lower leg cellulitis 11/20/2019    Neurogenic bladder 11/20/2019    Bacteriuria 11/20/2019    Abnormality of urethral meatus 11/20/2019    Acute encephalopathy 10/08/2019    Problem with urinary catheter ([facility] [address] 10/07/2019    Edema 17/54/9345    Complicated UTI (urinary tract infection) 01/07/2019    Hypothermia 01/03/2019    Acute low back pain without sciatica 05/26/2017    Hip fracture, right ([facility] 05/01/2015    Acute right hip pain     Lower GI bleeding 11/10/2014    CAD (coronary artery disease) 01/27/2014    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 XXXA] 01/03/2019       Assessment and Plan:     1. Sinus bradycardia  2. Positive flu  3. Hypothermia  4. Hypothyroidism    Agree with starting on thyroxine  Her heart rate is much better now -in sinus rhythm up to 70 to 80 bpm  Continue to avoid AV node blocking agents  We will plan for a 2-week CAM monitor at the time of discharge    EP will sign off  Please call with any questions    Thank you for allowing me to participate in the care of this patient. If you have any questions, please do not hesitate to contact me. Danya Handy MD   Cardiac Electrophysiology  [address]    For any EP related issues after 5 PM, contact [facility] on call cardiology through .

## 2022-01-29 NOTE — PROGRESS NOTES
Lab called with a positive blood culture for Staph Epi. Md aware. No new orders at this time. Will continue to monitor.   Electronically signed by Kushal Jack RN on 1/29/2022 at 2:35 PM

## 2022-01-29 NOTE — PROGRESS NOTES
Speech Language Pathology  Facility/Department: [facility] [id number] [facility]   CLINICAL BEDSIDE SWALLOW EVALUATION    NAME: Aba Zeng  : 1940  MRN: [id number]    ADMISSION DATE: 2022  ADMITTING DIAGNOSIS: has Hypotension; Light headed; Cellulitis; Essential hypertension; GERD (gastroesophageal reflux disease); Acute blood loss anemia; Tinea corporis; Carotid stenosis; CAD (coronary artery disease); S/P CABG x 5; Lower GI bleeding; Hip fracture, right ([address] Acute right hip pain; Acute low back pain without sciatica; Hypothermia; Complicated UTI (urinary tract infection); Edema; Problem with urinary catheter ([address] Acute encephalopathy; Chronic indwelling Iglesias catheter; Hyperlipidemia; Bilateral lower leg cellulitis; Neurogenic bladder; Bacteriuria; Abnormality of urethral meatus; Gross hematuria; CHF with unknown LVEF ([address] Dyspnea; Bradycardia; Pseudomonas infection; Acute renal injury ([address] Cellulitis of scrotum; Constipation; Encopresis with constipation and overflow incontinence; Abnormal stress test; H/O angiography; Abnormal angiogram; Acute cystitis with hematuria; Acute renal failure superimposed on stage 3 chronic kidney disease ([address] Urinary tract infection associated with indwelling urethral catheter ([address] Encephalopathy acute; Altered mental status; Hyperkalemia; Leg laceration, unspecified laterality, initial encounter; Pain of right lower extremity; Degloving injury; Weakness; Symptomatic bradycardia; Bilateral leg edema; and Symptomatic sinus bradycardia on their problem list.  ONSET DATE: 2022    Recent Chest Xray: (2022)  Impression       1.  Stable left basilar pleural-parenchymal opacities and bilateral interstitial opacities. Recent CT Head: (2022)      Impression       Head   1.  No evidence acute intracranial hemorrhage or mass effect. 2.  Mild to moderate atrophy.       Cervical spine   1.  No evidence of acute fracture or malalignment.    2.  Moderate multilevel cervical spondylosis. Prior MBS (02/18/2021)  Oral Phase: Mild oral deficits characterized by weak lingual manipulation resulting in premature spillage to level of valleculae w/ all consistencies; mildly delayed swallow initiation and intermittent cues required to swallow. Piecemeal swallow noted x2 w/ thin liquids; statis of valleculae and cleared w/ secondary swallow.     Pharyngeal: Mild pharyngeal phase characterized by vallecular residue that required secondary swallow to clear. No penetration or aspiration w/ any consistencies or trials. No pharyngeal residue.     Upper Esophageal Screen: Adequate clearance of UES and proximal esophagus. Pt does endorsed acid reflux; further f/u w/ PCP or GI. Date of Eval: 1/29/2022  Evaluating Therapist: CHHAYA Fiore    Current Diet level:  Current Diet : NPO  Current Liquid Diet : NPO      Primary Complaint  Patient Complaint: Did not state    Pain:  Pain Assessment  Pain Assessment: 0-10  Pain Level: 0  Patient's Stated Pain Goal: No pain  Pain Type: Chronic pain  Pain Location: Foot  Pain Orientation: Right,Left  Pain Descriptors: Aching  Pain Frequency: Continuous  Pain Onset: On-going  Clinical Progression: Not changed  Functional Pain Assessment: Activities are not prevented  Non-Pharmaceutical Pain Intervention(s): Rest    Reason for Referral  Maricel Barakat was referred for a bedside swallow evaluation to assess the efficiency of his swallow function, identify signs and symptoms of aspiration and make recommendations regarding safe dietary consistencies, effective compensatory strategies, and safe eating environment. Impression  Dysphagia Diagnosis: Mild oral stage dysphagia  Dysphagia Impression : Mild oral dysphagia 2/2 weakness and reduced dentition. Assessed tolerance ice chips, thins via cup/straw, puree, and soft solids. D/t manual dexterity issues, pt with improved liquid intake via cup w/ straw.  Positive oral acceptance, mildly that he fell as he was transferring himself to the wheelchair, reported no loss of consciousness or injury to head. On arrival to the ED he did not complain of any pain but looked disheveled and had crumbs of food around his mouth. In the ED he was found to be intermittently confused and was hypothermic to 90.3 Fahrenheit, heart rate 59 sinus rhythm, blood pressure 114/75 and saturating 100% on room air. Of note the patient has a recent hospitalization on 01/17/2022 when he presented to the ED with complaint of weakness and fatigue and was noted to be bradycardic and later became hypothermic. Sepsis work-up then was negative, blood cultures and urine cultures from recent hospitalization are negative. He has a chronic Iglesias that was replaced. The patient was seen by cardiology and was supposed to get a 2-week CAM monitor for bradycardia prior to discharge but the monitor could not be placed. The patient was also seen by Dr. Maribel Carrillo who recommended that the presentation previous hospitalization was more consistent with bacteriuria instead of a UTI. Patient also got a arterial duplex of bilateral lower extremities that showed a right ROSARIO of 1.5 greater than 50% stenosis at the mid popliteal artery and abnormal monophasic Doppler waveforms.  '  Subjective  Subjective: Pt awake, alert, in bed. Behavior/Cognition: Alert; Cooperative  Respiratory Status: Room air  O2 Device: None (Room air)  Communication Observation: Functional  Follows Directions: Simple  Dentition: Some missing teeth  Patient Positioning: Upright in bed  Baseline Vocal Quality: Normal  Volitional Cough: Strong  Prior Dysphagia History: Last seen by speech earlier this month (01/19/2022), with recommendations for dysphagia soft & bite-sized, thin liquids. Prior MBS in 2021 with recommendations for soft solids and thin liquids (see impression statement above)  Consistencies Administered: Dysphagia Soft and Bite-Sized (Dysphagia III); Dysphagia Pureed (Dysphagia I); Thin;Ice Chips    Vision/Hearing  Vision  Vision: Impaired  Hearing  Hearing: Within functional limits    Oral Motor Deficits  Oral/Motor  Oral Motor: Within functional limits    Prognosis  Prognosis  Prognosis for safe diet advancement: fair  Individuals consulted  Consulted and agree with results and recommendations: Patient;RN    Education  Patient Education: Educated pt to purpose of visit, swallow function, recommendations. Patient Education Response: Verbalizes understanding;Needs reinforcement  Safety Devices in place: Yes  Type of devices: All fall risk precautions in place; Left in bed;Bed alarm in place;Call light within reach       Therapy Time  SLP Individual Minutes  Time In: 8926  Time Out: 1815  Minutes: 20     SLP Total Treatment Time  Timed Code Treatment Minutes: 0 Minutes  Total Treatment Time: 20    Plan  Diet Recommendations: dysphagia soft & bite-sized / thin liquids / meds in puree ; as tolerated    If signs of aspiration or associated decline in respiratory status arise, recommend withhold PO and contact speech. Discharge Plan:  TBD  Discussed with RN, Omar Horne. Needs within reach. Electronically Signed by:  Alin Rainey M.A., Tameka Morillo   Speech-Language Pathologist  Pager #[phone]    This document will serve as a discharge summary if pt discharges before next treatment.

## 2022-01-29 NOTE — PROGRESS NOTES
Spoke with Sharon CUETO, updated and answered all questions at this time.   Electronically signed by Angela Mock RN on 1/29/2022 at 6:34 PM

## 2022-01-30 LAB
ANION GAP SERPL CALCULATED.3IONS-SCNC: 9 MMOL/L (ref 3–16)
BASOPHILS ABSOLUTE: 0 K/UL (ref 0–0.2)
BASOPHILS RELATIVE PERCENT: 0.2 %
BUN BLDV-MCNC: 36 MG/DL (ref 7–20)
CALCIUM SERPL-MCNC: 8.8 MG/DL (ref 8.3–10.6)
CHLORIDE BLD-SCNC: 114 MMOL/L (ref 99–110)
CO2: 26 MMOL/L (ref 21–32)
CREAT SERPL-MCNC: 1.5 MG/DL (ref 0.8–1.3)
CULTURE, BLOOD 2: NORMAL
EOSINOPHILS ABSOLUTE: 0.1 K/UL (ref 0–0.6)
EOSINOPHILS RELATIVE PERCENT: 0.5 %
GFR AFRICAN AMERICAN: 54
GFR NON-AFRICAN AMERICAN: 45
GLUCOSE BLD-MCNC: 101 MG/DL (ref 70–99)
HCT VFR BLD CALC: 28.2 % (ref 40.5–52.5)
HEMOGLOBIN: 8.9 G/DL (ref 13.5–17.5)
LYMPHOCYTES ABSOLUTE: 0.6 K/UL (ref 1–5.1)
LYMPHOCYTES RELATIVE PERCENT: 4.4 %
MCH RBC QN AUTO: 28.3 PG (ref 26–34)
MCHC RBC AUTO-ENTMCNC: 31.4 G/DL (ref 31–36)
MCV RBC AUTO: 90.1 FL (ref 80–100)
MONOCYTES ABSOLUTE: 0.8 K/UL (ref 0–1.3)
MONOCYTES RELATIVE PERCENT: 5.7 %
NEUTROPHILS ABSOLUTE: 12.1 K/UL (ref 1.7–7.7)
NEUTROPHILS RELATIVE PERCENT: 89.2 %
ORGANISM: ABNORMAL
ORGANISM: ABNORMAL
PDW BLD-RTO: 18.6 % (ref 12.4–15.4)
PLATELET # BLD: 127 K/UL (ref 135–450)
PMV BLD AUTO: 9.3 FL (ref 5–10.5)
POTASSIUM REFLEX MAGNESIUM: 4.6 MMOL/L (ref 3.5–5.1)
RBC # BLD: 3.14 M/UL (ref 4.2–5.9)
SODIUM BLD-SCNC: 145 MMOL/L (ref 136–145)
SODIUM BLD-SCNC: 148 MMOL/L (ref 136–145)
SODIUM BLD-SCNC: 149 MMOL/L (ref 136–145)
URINE CULTURE, ROUTINE: ABNORMAL
URINE CULTURE, ROUTINE: ABNORMAL
WBC # BLD: 13.5 K/UL (ref 4–11)

## 2022-01-30 PROCEDURE — 2500000003 HC RX 250 WO HCPCS: Performed by: STUDENT IN AN ORGANIZED HEALTH CARE EDUCATION/TRAINING PROGRAM

## 2022-01-30 PROCEDURE — A4216 STERILE WATER/SALINE, 10 ML: HCPCS | Performed by: STUDENT IN AN ORGANIZED HEALTH CARE EDUCATION/TRAINING PROGRAM

## 2022-01-30 PROCEDURE — 6370000000 HC RX 637 (ALT 250 FOR IP): Performed by: STUDENT IN AN ORGANIZED HEALTH CARE EDUCATION/TRAINING PROGRAM

## 2022-01-30 PROCEDURE — 84295 ASSAY OF SERUM SODIUM: CPT

## 2022-01-30 PROCEDURE — 2580000003 HC RX 258

## 2022-01-30 PROCEDURE — 6370000000 HC RX 637 (ALT 250 FOR IP)

## 2022-01-30 PROCEDURE — 85025 COMPLETE CBC W/AUTO DIFF WBC: CPT

## 2022-01-30 PROCEDURE — 92526 ORAL FUNCTION THERAPY: CPT

## 2022-01-30 PROCEDURE — 94640 AIRWAY INHALATION TREATMENT: CPT

## 2022-01-30 PROCEDURE — 2580000003 HC RX 258: Performed by: STUDENT IN AN ORGANIZED HEALTH CARE EDUCATION/TRAINING PROGRAM

## 2022-01-30 PROCEDURE — 1200000000 HC SEMI PRIVATE

## 2022-01-30 PROCEDURE — 6360000002 HC RX W HCPCS: Performed by: STUDENT IN AN ORGANIZED HEALTH CARE EDUCATION/TRAINING PROGRAM

## 2022-01-30 PROCEDURE — 6360000002 HC RX W HCPCS

## 2022-01-30 PROCEDURE — 94761 N-INVAS EAR/PLS OXIMETRY MLT: CPT

## 2022-01-30 PROCEDURE — 2700000000 HC OXYGEN THERAPY PER DAY

## 2022-01-30 PROCEDURE — 80048 BASIC METABOLIC PNL TOTAL CA: CPT

## 2022-01-30 PROCEDURE — 36415 COLL VENOUS BLD VENIPUNCTURE: CPT

## 2022-01-30 RX ORDER — DEXTROSE MONOHYDRATE 50 MG/ML
INJECTION, SOLUTION INTRAVENOUS CONTINUOUS
Status: DISCONTINUED | OUTPATIENT
Start: 2022-01-30 | End: 2022-01-31

## 2022-01-30 RX ADMIN — SODIUM CHLORIDE 25 ML: 900 INJECTION INTRAVENOUS at 15:43

## 2022-01-30 RX ADMIN — BISACODYL 10 MG: 10 SUPPOSITORY RECTAL at 08:41

## 2022-01-30 RX ADMIN — IPRATROPIUM BROMIDE AND ALBUTEROL SULFATE 1 AMPULE: 2.5; .5 SOLUTION RESPIRATORY (INHALATION) at 20:38

## 2022-01-30 RX ADMIN — PANTOPRAZOLE SODIUM 40 MG: 40 TABLET, DELAYED RELEASE ORAL at 09:23

## 2022-01-30 RX ADMIN — IPRATROPIUM BROMIDE AND ALBUTEROL SULFATE 1 AMPULE: 2.5; .5 SOLUTION RESPIRATORY (INHALATION) at 15:41

## 2022-01-30 RX ADMIN — AMPICILLIN AND SULBACTAM 1500 MG: 1; .5 INJECTION, POWDER, FOR SOLUTION INTRAMUSCULAR; INTRAVENOUS at 21:51

## 2022-01-30 RX ADMIN — OSELTAMIVIR PHOSPHATE 30 MG: 30 CAPSULE ORAL at 21:48

## 2022-01-30 RX ADMIN — SODIUM CHLORIDE 25 ML: 900 INJECTION INTRAVENOUS at 04:04

## 2022-01-30 RX ADMIN — SODIUM CHLORIDE 25 ML: 900 INJECTION INTRAVENOUS at 08:36

## 2022-01-30 RX ADMIN — ACETAMINOPHEN 650 MG: 325 TABLET ORAL at 23:33

## 2022-01-30 RX ADMIN — SODIUM CHLORIDE, PRESERVATIVE FREE 5 ML: 5 INJECTION INTRAVENOUS at 08:38

## 2022-01-30 RX ADMIN — SODIUM CHLORIDE, PRESERVATIVE FREE 10 ML: 5 INJECTION INTRAVENOUS at 09:24

## 2022-01-30 RX ADMIN — SODIUM CHLORIDE 25 ML: 900 INJECTION INTRAVENOUS at 21:50

## 2022-01-30 RX ADMIN — OSELTAMIVIR PHOSPHATE 30 MG: 30 CAPSULE ORAL at 08:41

## 2022-01-30 RX ADMIN — ENOXAPARIN SODIUM 40 MG: 100 INJECTION SUBCUTANEOUS at 08:40

## 2022-01-30 RX ADMIN — AMPICILLIN AND SULBACTAM 1500 MG: 1; .5 INJECTION, POWDER, FOR SOLUTION INTRAMUSCULAR; INTRAVENOUS at 08:37

## 2022-01-30 RX ADMIN — ATORVASTATIN CALCIUM 80 MG: 40 TABLET, FILM COATED ORAL at 21:48

## 2022-01-30 RX ADMIN — IPRATROPIUM BROMIDE AND ALBUTEROL SULFATE 1 AMPULE: 2.5; .5 SOLUTION RESPIRATORY (INHALATION) at 10:52

## 2022-01-30 RX ADMIN — DOCUSATE SODIUM 200 MG: 100 CAPSULE, LIQUID FILLED ORAL at 08:41

## 2022-01-30 RX ADMIN — ASPIRIN 81 MG: 81 TABLET, CHEWABLE ORAL at 08:41

## 2022-01-30 RX ADMIN — DOCUSATE SODIUM 200 MG: 100 CAPSULE, LIQUID FILLED ORAL at 21:48

## 2022-01-30 RX ADMIN — AMPICILLIN AND SULBACTAM 1500 MG: 1; .5 INJECTION, POWDER, FOR SOLUTION INTRAMUSCULAR; INTRAVENOUS at 04:04

## 2022-01-30 RX ADMIN — CYANOCOBALAMIN TAB 1000 MCG 1000 MCG: 1000 TAB at 08:42

## 2022-01-30 RX ADMIN — LEVOTHYROXINE SODIUM ANHYDROUS 56 MCG: 100 INJECTION, POWDER, LYOPHILIZED, FOR SOLUTION INTRAVENOUS at 08:38

## 2022-01-30 RX ADMIN — GABAPENTIN 600 MG: 600 TABLET, FILM COATED ORAL at 21:48

## 2022-01-30 RX ADMIN — AMPICILLIN AND SULBACTAM 1500 MG: 1; .5 INJECTION, POWDER, FOR SOLUTION INTRAMUSCULAR; INTRAVENOUS at 15:44

## 2022-01-30 RX ADMIN — GABAPENTIN 600 MG: 600 TABLET, FILM COATED ORAL at 08:41

## 2022-01-30 RX ADMIN — DEXTROSE MONOHYDRATE: 50 INJECTION, SOLUTION INTRAVENOUS at 08:32

## 2022-01-30 ASSESSMENT — ENCOUNTER SYMPTOMS
DIARRHEA: 0
COUGH: 0
CHEST TIGHTNESS: 0
VOMITING: 0
SHORTNESS OF BREATH: 0
ABDOMINAL PAIN: 0
CONSTIPATION: 1
ABDOMINAL DISTENTION: 1
NAUSEA: 0

## 2022-01-30 ASSESSMENT — PAIN DESCRIPTION - PROGRESSION: CLINICAL_PROGRESSION: NOT CHANGED

## 2022-01-30 ASSESSMENT — PAIN SCALES - GENERAL
PAINLEVEL_OUTOF10: 0
PAINLEVEL_OUTOF10: 0
PAINLEVEL_OUTOF10: 8
PAINLEVEL_OUTOF10: 0

## 2022-01-30 ASSESSMENT — PAIN DESCRIPTION - ONSET: ONSET: ON-GOING

## 2022-01-30 ASSESSMENT — PAIN DESCRIPTION - PAIN TYPE: TYPE: ACUTE PAIN

## 2022-01-30 ASSESSMENT — PAIN DESCRIPTION - DESCRIPTORS: DESCRIPTORS: ACHING;CRAMPING

## 2022-01-30 ASSESSMENT — PAIN - FUNCTIONAL ASSESSMENT: PAIN_FUNCTIONAL_ASSESSMENT: ACTIVITIES ARE NOT PREVENTED

## 2022-01-30 ASSESSMENT — PAIN DESCRIPTION - LOCATION: LOCATION: RECTUM

## 2022-01-30 ASSESSMENT — PAIN DESCRIPTION - FREQUENCY: FREQUENCY: INTERMITTENT

## 2022-01-30 NOTE — PROGRESS NOTES
Spoke with Kevin CUETO.   Updated and answered all questions at this time  Electronically signed by Yaneth Salgado RN on 1/30/2022 at 5:04 PM

## 2022-01-30 NOTE — PLAN OF CARE
Problem: Falls - Risk of:  Goal: Will remain free from falls  Description: Will remain free from falls  1/30/2022 1602 by Cuba Boyer RN  Outcome: Ongoing  Note: Pt free from injury or falls at this time, fall precautions in place, bed in low position, side rail up x2, Scherer Fall Risk: high, bed alarm on, reoriented to room and call light, reminded not to get up without assistance. Pt verbalizes understanding of fall risk procedures. Will continue with hourly rounds for PO intake, pain needs, toileting, and repositioning as needed. No needs expressed at this time. Call light in reach, will continue to monitor. 1/30/2022 0246 by Gamaliel Guadarrama RN  Outcome: Ongoing  Note: Pt is a Fall Risk. See Wilson Garzonberg Fall Risk Score. Pt bed in low position and side rails up. Call light and belongings in reach. Pt encouraged to call for assistance. Will continue with hourly rounds for PO intake, pain needs, toileting, and repositioning as needed. Problem: Skin Integrity:  Goal: Absence of new skin breakdown  Description: Absence of new skin breakdown  1/30/2022 1602 by Cuba Boyer RN  Outcome: Ongoing  Note: No new signs of skin break down. Pt has received rich care and schneider care. Calmopetine has been used on the excoriated areas. Will continue to Q2 turn and monitor. 1/30/2022 0246 by Gamaliel Guadarrama RN  Outcome: Ongoing  Note: Pt repositioned an rich care provided as needed q2 hours. No new skin breakdown noted this shift.

## 2022-01-30 NOTE — PROGRESS NOTES
Progress Note    Admit Date: 1/26/2022  Diet: ADULT DIET; Dysphagia - Soft and Bite Sized; Low Sodium (2 gm)  ADULT ORAL NUTRITION SUPPLEMENT; Breakfast, Lunch; Standard High Calorie/High Protein Oral Supplement  ADULT ORAL NUTRITION SUPPLEMENT; Dinner, Breakfast; Wound Healing Oral Supplement    CC: fall    Interval history: Cleared by SLP for diet yesterday, ate dinner per RN. No acute events overnight. Patient remains afebrile with stable vitals. Now on 1L oxygen however SpO2 96%. Patient was seen at bedside this morning, he is significantly confused and does not respond to questions appropriately however with clear speech and no FND. Hypernatremic to 149 from 147 yesterday on LR. FWD approx 3.6L.     Medications:     Scheduled Meds:   ampicillin-sulbactam  1,500 mg IntraVENous Q6H    ipratropium-albuterol  1 ampule Inhalation Q4H WA    levothyroxine  56 mcg IntraVENous Daily    And    sodium chloride (PF)  5 mL IntraVENous Daily    aspirin  81 mg Oral Daily    pantoprazole  40 mg Oral QAM AC    vitamin B-12  1,000 mcg Oral Daily    gabapentin  600 mg Oral BID    atorvastatin  80 mg Oral Nightly    sodium chloride flush  5-40 mL IntraVENous 2 times per day    enoxaparin  40 mg SubCUTAneous Daily    docusate sodium  200 mg Oral BID    bisacodyl  10 mg Rectal Daily    oseltamivir  30 mg Oral BID     Continuous Infusions:   lactated ringers 75 mL/hr at 01/29/22 2330    sodium chloride 25 mL (01/30/22 0404)     PRN Meds:menthol-zinc oxide, sodium chloride (Inhalant), melatonin, dicyclomine, albuterol, guaiFENesin, sodium chloride flush, sodium chloride, ondansetron **OR** ondansetron, polyethylene glycol, acetaminophen **OR** acetaminophen    Objective:   Vitals:   T-max:  Patient Vitals for the past 8 hrs:   BP Temp Temp src Pulse Resp SpO2 Weight   01/30/22 0632    69      01/30/22 0437       205 lb 11 oz (93.3 kg)   01/30/22 0359 126/63 98.8 °F (37.1 °C) Axillary 98 12 96 %    01/30/22 0214    75      01/29/22 2323 132/68 98.8 °F (37.1 °C) Axillary 84 10 95 %        Intake/Output Summary (Last 24 hours) at 1/30/2022 0719  Last data filed at 1/30/2022 0359  Gross per 24 hour   Intake 120 ml   Output 2050 ml   Net -1930 ml       Review of Systems   Constitutional: Positive for chills and fatigue. Negative for fever. Respiratory: Negative for cough, chest tightness and shortness of breath. Cardiovascular: Positive for palpitations. Negative for chest pain and leg swelling. Gastrointestinal: Positive for abdominal distention and constipation. Negative for abdominal pain, diarrhea, nausea and vomiting. Genitourinary: Negative for difficulty urinating. Neurological: Positive for dizziness, weakness and light-headedness. Physical Exam  Constitutional:       General: He is not in acute distress. Appearance: Normal appearance. HENT:      Head: Normocephalic and atraumatic. Nose: Nose normal.   Eyes:      Conjunctiva/sclera: Conjunctivae normal.      Pupils: Pupils are equal, round, and reactive to light. Cardiovascular:      Rate and Rhythm: Regular rhythm. Bradycardia present. Pulses: Normal pulses. Heart sounds: No murmur heard. No friction rub. No gallop. Pulmonary:      Effort: Pulmonary effort is normal. No respiratory distress. Breath sounds: Normal breath sounds. No wheezing. Abdominal:      General: Abdomen is flat. Bowel sounds are normal. There is distension. Hernia: A hernia is present. Musculoskeletal:         General: Swelling and tenderness present. Cervical back: Normal range of motion. Skin:     General: Skin is warm and dry. Neurological:      Mental Status: He is alert. Cranial Nerves: No cranial nerve deficit. Sensory: Sensation is intact.       Comments: Oriented to person and place not time         LABS:    CBC:   Recent Labs     01/28/22  0600 01/29/22  0725 01/30/22  0612   WBC 7.3 12.9* 13.5*   HGB 8. 5* 8.7* 8.9*   HCT 27.0* 27.4* 28.2*   * 125* 127*   MCV 90.0 88.7 90.1     Renal:    Recent Labs     01/28/22  0600 01/29/22  0725 01/30/22  0612    147* 149*   K 4.6 4.6 4.6   * 113* 114*   CO2 26 26 26   BUN 40* 42* 36*   CREATININE 1.4* 1.5* 1.5*   GLUCOSE 74 85 101*   CALCIUM 8.4 8.7 8.8   ANIONGAP 6 8 9     Hepatic:   No results for input(s): AST, ALT, BILITOT, BILIDIR, PROT, LABALBU, ALKPHOS in the last 72 hours. Troponin:   Recent Labs     01/28/22  0600   TROPONINI 0.11*     BNP: No results for input(s): BNP in the last 72 hours. Lipids: No results for input(s): CHOL, HDL in the last 72 hours. Invalid input(s): LDLCALCU, TRIGLYCERIDE  ABGs:    Recent Labs     01/28/22  1458   PHART 7.391   NQK2GEZ 41.1   PO2ART 72.4*   QRR9KUF 25.0   BEART 0   R3VUGCDX 94   AIU8GLT 26       INR: No results for input(s): INR in the last 72 hours. Lactate:   Recent Labs     01/28/22  1458   LACTATE 0.58     Cultures:  -----------------------------------------------------------------  RAD:   XR CHEST PORTABLE   Final Result      1. Stable left basilar pleural-parenchymal opacities and bilateral interstitial opacities. CT CHEST ABDOMEN PELVIS W CONTRAST   Final Result     Findings likely consistent with stercoral proctitis. Cystitis    also suspected.       _______________________________________________       IMPRESSION:          Masslike consolidation of the left lung base which could be    secondary to a pneumonia though underlying neoplasm considered. Nearby pleural fluid collection concerning for an empyema. XR CHEST PORTABLE   Final Result      1. Decreased bilateral interstitial opacities which may represent improved interstitial edema. 2.  Mild cardiomegaly. 3.  Chronic left basilar pleural and parenchymal opacities, unchanged         XR TIBIA FIBULA RIGHT (2 VIEWS)   Final Result      Pelvis and right hip:   1. Diffuse demineralization limits evaluation.  No evidence of acute fracture. 2.  Status post right femur ORIF without evidence of hardware complication. Right tib-fib:   1. No acute osseous findings. XR HIP 2-3 VW W PELVIS RIGHT   Final Result      Pelvis and right hip:   1. Diffuse demineralization limits evaluation. No evidence of acute fracture. 2.  Status post right femur ORIF without evidence of hardware complication. Right tib-fib:   1. No acute osseous findings. CT Head WO Contrast   Final Result      Head   1. No evidence acute intracranial hemorrhage or mass effect. 2.  Mild to moderate atrophy. Cervical spine   1. No evidence of acute fracture or malalignment. 2.  Moderate multilevel cervical spondylosis. CT Cervical Spine WO Contrast   Final Result      Head   1. No evidence acute intracranial hemorrhage or mass effect. 2.  Mild to moderate atrophy. Cervical spine   1. No evidence of acute fracture or malalignment. 2.  Moderate multilevel cervical spondylosis. XR HIP 2-3 VW W PELVIS RIGHT    (Results Pending)       Assessment/Plan:     1. Symptomatic Bradycardia likely 2/2 Hypothermia, resolved   Patient was found down for unknown amount of time. Patient has previous hx of bradycardia with HR down to 30s in previous hospitalizations. Was supposed to have a CAM monitor placed which wasn't placed. Patient is not on any remington agents. Bradycardia is improving with improvements in body temperature. - On telemetry   - Omaira NICOLE followin week CAM on discharge    2. Hypothermia, resolved  Likely 2/2 Hypothyroidism or Anemia vs Influenza   Patient was hypothermic on arrival with T 90.3. He reports fatigue, constipation, and difficulty concentrating. TSH 5.19 in 2022 with normal T4. Denies weight gain, reporting weight loss. CBC showed signs of anemia with Hgb of 8.6 and Hct 26.9. Had fall with unknown fall time.   Currently T 97.9.  - TSH 5.4 with reflex to T4 1, started on synthyroid 56 mcg  - Normal cortisol levels, recent 12   - Influenza B positive, continue Tamiflu 30 mg BID    3. Altered mental status, resolved   Patient was less responsive last afternoon and had a drop in O2 saturation. A stat EKG and chest Xray was ordered which were stable. Patient was also having a weak cough with concerns of aspiration, respiratory therapist and Dr Trev Carreon was at bedside with improvement in patients status thereafter following deep suctioning  -s/p passed bedside swallow this morning   -started on dysphagia diet as per recommendations   -Stop LR, start D5 in the setting of hypernatremia    4. Rule out Sepsis   Patient was hypothermic on arrival with bradycardia and intermittently confused. - Procalcitonin 0.12  - Blood cultures show NGTD   - On Unasyn    5. Cystitis  UA on 1/17 was cloudy with 10-20 WBC and moderate leukocyte esterase. UA today showed 3+ bacteria and trace leukocyte esterase. CT Abd/Pelvis showed thickening of the bladder consistent with possible cystitis. Patient has chronic schneider catheter which was replaced 10 days ago. - Replaced schneider catheter  - urine cultures positive for Trichosporon and E fecalis, started on Unasyn (1/28-)  - F/u sensitivities    6. Troponinemia  - Troponin 0.05 on arrival, recent 0.11, s/p EKG which showed no new changes from prior    7. Constipation  Patient reports that he has been constipated for 2 weeks. - Home meds  - started on dulcolax suppository   - Continue to monitor    8. 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 Arroyo [place] care following     9. BL LE Neuropathy  - Continue Gabapentin    10. Concern for malignancy  - Patient has a smoking history. Denies any alcohol use. Reports anorexia and recent weight loss. He denies hemoptysis currently. - CT Abd/Pelvis/Chest showed left lower lobe consolidation suspicious of pneumonia vs malignancy  -consulted pulmonology: mass likely 2/2 rounded atelectasis, no further pulm w/o required      Code Status: DNR-CCA   FEN: ADULT DIET; Dysphagia - Soft and Bite Sized;  Low Sodium (2 gm)  ADULT ORAL NUTRITION SUPPLEMENT; Breakfast, Lunch; Standard High Calorie/High Protein Oral Supplement  ADULT ORAL NUTRITION SUPPLEMENT; Dinner, Breakfast; Wound Healing Oral Supplement  PPX: Lovenox  DISPO: Venus Michel MD, PGY-3  01/30/22  7:19 AM    This patient has been staffed and discussed with Anne Moraes MD.

## 2022-01-30 NOTE — PLAN OF CARE
Problem: Falls - Risk of:  Goal: Will remain free from falls  Description: Will remain free from falls  1/30/2022 0246 by Az Davis RN  Outcome: Ongoing  Note: Pt is a Fall Risk. See Edwena Reil Fall Risk Score. Pt bed in low position and side rails up. Call light and belongings in reach. Pt encouraged to call for assistance. Will continue with hourly rounds for PO intake, pain needs, toileting, and repositioning as needed. Problem: Skin Integrity:  Goal: Absence of new skin breakdown  Description: Absence of new skin breakdown  Outcome: Ongoing  Note: Pt repositioned an rich care provided as needed q2 hours. No new skin breakdown noted this shift. Problem: Nutrition  Goal: Optimal nutrition therapy  Outcome: Ongoing  Note: Pt able to tolerate soft, small and bite sized dinner tonight. Able to tolerate meds crushed in apple sauce. Problem: Psychomotor Activity - Altered:  Goal: Absence of psychomotor disturbance signs and symptoms  Description: Absence of psychomotor disturbance signs and symptoms  Outcome: Ongoing  Note: Pt is talking to persons that are not in his room. Reoriented and provided minimal stimulation and rounding for sleep.

## 2022-01-30 NOTE — CARE COORDINATION
Case Management Assessment           Daily Note                 Date/ Time of Note: 1/30/2022 9:09 AM         Note completed by: ARIC Todd, LSW    Patient Name: Irma Hopkins  YOB: 1940    Diagnosis:Symptomatic sinus bradycardia [R00.1]  Hypothermia, initial encounter [T68. XXXA]  Fall from bed, initial encounter [W06. XXXA]  Patient Admission Status: Inpatient    Date of Admission:1/26/2022  8:07 PM Length of Stay: 3 GLOS: GMLOS: 2.7    Current Plan of Care: Omaira ramos, Influenza B, Trichosporon and E fecalis infection, IV abx, 1L O2, APS involved   ________________________________________________________________________________________  PT AM-PAC: 6 / 24 per last evaluation on: 1/27    OT AM-PAC: 14 / 24 per last evaluation on: 1/27    DME Needs for discharge: defer  ________________________________________________________________________________________  Discharge Plan: Inpatient Rehab: [facility]    Tentative discharge date: tbd    Current barriers to discharge: medical stable enough to submit pre-cert for ARU, IV abx,     Referrals completed: Not Applicable    Resources/ information provided: Not indicated at this time  ________________________________________________________________________________________  Case Management Notes:    Pt's children emailed SW regarding DCP. The family is in agreement and would like to persue inpatient rehab. They are also working w/[employer] for long term plans, they are going to discuss going temporarily to an AL for 30 days or possibly longer after ARU w/Pt. SW informed them they will also need to decide on a back up plan in case insurance doesn't approve of an ARU stay. SW spoke w/[facility], they are following and will wait until Pt is more medically stable to start pre-cert. SW following. Davi Thomas and his family were provided with choice of provider; he and his family are in agreement with the discharge plan.     Care Transition Patient: ARIC Asencio, [facility]  Case Management Department  Ph: [phone]  Fax: [phone]

## 2022-01-30 NOTE — PROGRESS NOTES
Attempted to call son John Mendez this morning. Sent to voicemail and unable to leave message.     Electronically signed by Travis Ruano RN on 1/30/2022 at 7:01 AM

## 2022-01-30 NOTE — PROGRESS NOTES
Speech Language Pathology  Facility/Department: [facility] 4 PCU  Dysphagia Daily Treatment Note    NAME: Gigi Decker  : 1940  MRN: [id number]    Patient Diagnosis(es):   Patient Active Problem List    Diagnosis Date Noted    Symptomatic sinus bradycardia 2022    Bilateral leg edema 2022    Symptomatic bradycardia     Weakness 2022    Pain of right lower extremity     Degloving injury     Leg laceration, unspecified laterality, initial encounter 10/31/2021    Altered mental status     Hyperkalemia     Encephalopathy acute 2021    Acute renal failure superimposed on stage 3 chronic kidney disease ([address] 02/15/2021    Urinary tract infection associated with indwelling urethral catheter ([address] 02/15/2021    Acute cystitis with hematuria 2021    Abnormal angiogram 2020    H/O angiography 2020    Abnormal stress test 2020    Constipation 2020    Encopresis with constipation and overflow incontinence 2020    Cellulitis of scrotum 2020    Acute renal injury ([address] 2020    Pseudomonas infection 2020    Dyspnea     Bradycardia     CHF with unknown LVEF ([address] 2020    Gross hematuria 2019    Chronic indwelling Iglesias catheter 2019    Hyperlipidemia 2019    Bilateral lower leg cellulitis 2019    Neurogenic bladder 2019    Bacteriuria 2019    Abnormality of urethral meatus 2019    Acute encephalopathy 10/08/2019    Problem with urinary catheter ([address] 10/07/2019    Edema     Complicated UTI (urinary tract infection) 2019    Hypothermia 2019    Acute low back pain without sciatica 2017    Hip fracture, right ([address] 2015    Acute right hip pain     Lower GI bleeding 11/10/2014    CAD (coronary artery disease) 2014    S/P CABG x 5 2014    Cellulitis 2013    Essential hypertension 2013    GERD (gastroesophageal reflux disease) 12/16/2013    Acute blood loss anemia 12/16/2013    Tinea corporis 12/16/2013    Carotid stenosis 12/16/2013    Hypotension 11/30/2013    Light headed 11/30/2013     Allergies: Allergies   Allergen Reactions    Bactrim [Sulfamethoxazole-Trimethoprim] Rash       Recent Chest Xray: (01/29/2022)  Impression       1.  Stable left basilar pleural-parenchymal opacities and bilateral interstitial opacities.      Recent CT Head: (01/26/2022)      Impression       Head   1.  No evidence acute intracranial hemorrhage or mass effect. 2.  Mild to moderate atrophy.       Cervical spine   1.  No evidence of acute fracture or malalignment. 2.  Moderate multilevel cervical spondylosis.      Prior MBS (02/18/2021)  Oral Phase: Mild oral deficits characterized by weak lingual manipulation resulting in premature spillage to level of valleculae w/ all consistencies; mildly delayed swallow initiation and intermittent cues required to swallow. Piecemeal swallow noted x2 w/ thin liquids; statis of valleculae and cleared w/ secondary swallow. Pharyngeal: Mild pharyngeal phase characterized by vallecular residue that required secondary swallow to clear. No penetration or aspiration w/ any consistencies or trials. No pharyngeal residue. Upper Esophageal Screen: Adequate clearance of UES and proximal esophagus. Pt does endorsed acid reflux; further f/u w/ PCP or GI.       Chart reviewed. Medical Diagnosis: Symptomatic sinus bradycardia [R00.1]  Hypothermia, initial encounter [T68. XXXA]  Fall from bed, initial encounter [W06. XXXA]   Treatment Diagnosis: dysphagia    BSE Impression 1/29/22  Dysphagia Impression : Mild oral dysphagia 2/2 weakness and reduced dentition. Assessed tolerance ice chips, thins via cup/straw, puree, and soft solids. D/t manual dexterity issues, pt with improved liquid intake via cup w/ straw.  Positive oral acceptance, mildly prolonged mastication, good oral clearance, no overt s/s aspiraiton. To facilitate oral phase, recommend dysphagia soft & bites-sized and thin liquids via straw, general aspiration precautions. Dysphagia Diagnosis: Mild oral stage dysphagia  MBS results - not indicated at this time    Pain: not indicated through any means    Current Diet : ADULT ORAL NUTRITION SUPPLEMENT; Breakfast, Lunch; Standard High Calorie/High Protein Oral Supplement  ADULT ORAL NUTRITION SUPPLEMENT; Dinner, Breakfast; Wound Healing Oral Supplement  ADULT DIET; Dysphagia - Minced and Moist; Low Sodium (2 gm)   Recommended Form of Meds: PO     Treatment:  Pt seen bedside to address the following goals:   Goal 1: Patient will tolerate least restrictive diet without overt signs of aspiration or associated decline in respiratory status. 1/30: pt sitting up in bed, feeding self breakfast, though appears pt would benefit from assistance, as many spills noted on tray. Pt confused, talking about working and needing . Pt required cues to discontinue talking while eating. Pt consumed puree and thin liquids via cup and straw with no overt signs of aspiration, voice remained clear. Pt demonstrated very prolonged mastication with oral residue occurring, when attempting to masticate bite sized sausage. No respiratory decline per chart review, pt on 1L O2. Recommend downgrading to dysphagia II minced and moist/thin liquids   Cont goal    Goal 2: Patient/caregiver will demonstrate understanding of swallowing concerns/recommendations  1/30: pt educated to purpose of visit, does not indicate comprehension  Cont goal    Patient/Family/Caregiver Education:  As above    Compensatory Strategies: Alternate solids and liquids  Eat/Feed slowly  Upright as possible for all oral intake  Assist feed  Small bites/sips      Plan:  Continue dysphagia treatment with goals per plan of care.   Diet recommendations: Downgrade to dysphagia II minced and moist /thin liquids  DC recommendation: TBD  Treatment: 13  D/W nursing Mikhail Vanessa  Needs met prior to leaving room, call button in reach. Edwar Hernandez M.S./[facility]SLP #[id number]  Pg.  # [phone]  If patient is discharged prior to next treatment, this note will serve as the discharge summary

## 2022-01-31 LAB
ANION GAP SERPL CALCULATED.3IONS-SCNC: 8 MMOL/L (ref 3–16)
BASOPHILS ABSOLUTE: 0 K/UL (ref 0–0.2)
BASOPHILS RELATIVE PERCENT: 0.2 %
BLOOD CULTURE, ROUTINE: ABNORMAL
BLOOD CULTURE, ROUTINE: ABNORMAL
BUN BLDV-MCNC: 33 MG/DL (ref 7–20)
CALCIUM SERPL-MCNC: 8.2 MG/DL (ref 8.3–10.6)
CHLORIDE BLD-SCNC: 111 MMOL/L (ref 99–110)
CO2: 25 MMOL/L (ref 21–32)
CREAT SERPL-MCNC: 1.3 MG/DL (ref 0.8–1.3)
EOSINOPHILS ABSOLUTE: 0.4 K/UL (ref 0–0.6)
EOSINOPHILS RELATIVE PERCENT: 3.2 %
GFR AFRICAN AMERICAN: >60
GFR NON-AFRICAN AMERICAN: 53
GLUCOSE BLD-MCNC: 111 MG/DL (ref 70–99)
HCT VFR BLD CALC: 25.8 % (ref 40.5–52.5)
HEMOGLOBIN: 8.2 G/DL (ref 13.5–17.5)
LYMPHOCYTES ABSOLUTE: 0.9 K/UL (ref 1–5.1)
LYMPHOCYTES RELATIVE PERCENT: 7.5 %
MCH RBC QN AUTO: 28.3 PG (ref 26–34)
MCHC RBC AUTO-ENTMCNC: 32 G/DL (ref 31–36)
MCV RBC AUTO: 88.4 FL (ref 80–100)
MONOCYTES ABSOLUTE: 0.9 K/UL (ref 0–1.3)
MONOCYTES RELATIVE PERCENT: 7.1 %
NEUTROPHILS ABSOLUTE: 10 K/UL (ref 1.7–7.7)
NEUTROPHILS RELATIVE PERCENT: 82 %
ORGANISM: ABNORMAL
ORGANISM: ABNORMAL
PDW BLD-RTO: 18.5 % (ref 12.4–15.4)
PLATELET # BLD: 110 K/UL (ref 135–450)
PMV BLD AUTO: 9.9 FL (ref 5–10.5)
POTASSIUM REFLEX MAGNESIUM: 4.1 MMOL/L (ref 3.5–5.1)
RBC # BLD: 2.91 M/UL (ref 4.2–5.9)
SODIUM BLD-SCNC: 144 MMOL/L (ref 136–145)
SODIUM BLD-SCNC: 144 MMOL/L (ref 136–145)
WBC # BLD: 12.2 K/UL (ref 4–11)

## 2022-01-31 PROCEDURE — 6360000002 HC RX W HCPCS

## 2022-01-31 PROCEDURE — 6370000000 HC RX 637 (ALT 250 FOR IP): Performed by: STUDENT IN AN ORGANIZED HEALTH CARE EDUCATION/TRAINING PROGRAM

## 2022-01-31 PROCEDURE — 2700000000 HC OXYGEN THERAPY PER DAY

## 2022-01-31 PROCEDURE — 97530 THERAPEUTIC ACTIVITIES: CPT

## 2022-01-31 PROCEDURE — 97110 THERAPEUTIC EXERCISES: CPT

## 2022-01-31 PROCEDURE — 92526 ORAL FUNCTION THERAPY: CPT

## 2022-01-31 PROCEDURE — 36415 COLL VENOUS BLD VENIPUNCTURE: CPT

## 2022-01-31 PROCEDURE — 1200000000 HC SEMI PRIVATE

## 2022-01-31 PROCEDURE — 6370000000 HC RX 637 (ALT 250 FOR IP)

## 2022-01-31 PROCEDURE — 2580000003 HC RX 258: Performed by: STUDENT IN AN ORGANIZED HEALTH CARE EDUCATION/TRAINING PROGRAM

## 2022-01-31 PROCEDURE — 2580000003 HC RX 258

## 2022-01-31 PROCEDURE — 80048 BASIC METABOLIC PNL TOTAL CA: CPT

## 2022-01-31 PROCEDURE — 84295 ASSAY OF SERUM SODIUM: CPT

## 2022-01-31 PROCEDURE — 99232 SBSQ HOSP IP/OBS MODERATE 35: CPT | Performed by: INTERNAL MEDICINE

## 2022-01-31 PROCEDURE — 97535 SELF CARE MNGMENT TRAINING: CPT

## 2022-01-31 PROCEDURE — 2500000003 HC RX 250 WO HCPCS: Performed by: STUDENT IN AN ORGANIZED HEALTH CARE EDUCATION/TRAINING PROGRAM

## 2022-01-31 PROCEDURE — 94640 AIRWAY INHALATION TREATMENT: CPT

## 2022-01-31 PROCEDURE — 6370000000 HC RX 637 (ALT 250 FOR IP): Performed by: INTERNAL MEDICINE

## 2022-01-31 PROCEDURE — 85025 COMPLETE CBC W/AUTO DIFF WBC: CPT

## 2022-01-31 PROCEDURE — 6360000002 HC RX W HCPCS: Performed by: STUDENT IN AN ORGANIZED HEALTH CARE EDUCATION/TRAINING PROGRAM

## 2022-01-31 PROCEDURE — 94761 N-INVAS EAR/PLS OXIMETRY MLT: CPT

## 2022-01-31 RX ORDER — IPRATROPIUM BROMIDE AND ALBUTEROL SULFATE 2.5; .5 MG/3ML; MG/3ML
1 SOLUTION RESPIRATORY (INHALATION) EVERY 4 HOURS PRN
Status: DISCONTINUED | OUTPATIENT
Start: 2022-01-31 | End: 2022-02-07 | Stop reason: HOSPADM

## 2022-01-31 RX ORDER — LEVOTHYROXINE SODIUM 0.07 MG/1
75 TABLET ORAL DAILY
Status: DISCONTINUED | OUTPATIENT
Start: 2022-02-01 | End: 2022-02-07 | Stop reason: HOSPADM

## 2022-01-31 RX ORDER — AMOXICILLIN AND CLAVULANATE POTASSIUM 875; 125 MG/1; MG/1
1 TABLET, FILM COATED ORAL EVERY 12 HOURS SCHEDULED
Status: DISCONTINUED | OUTPATIENT
Start: 2022-01-31 | End: 2022-02-07

## 2022-01-31 RX ADMIN — GABAPENTIN 600 MG: 600 TABLET, FILM COATED ORAL at 07:57

## 2022-01-31 RX ADMIN — ENOXAPARIN SODIUM 40 MG: 100 INJECTION SUBCUTANEOUS at 07:57

## 2022-01-31 RX ADMIN — CYANOCOBALAMIN TAB 1000 MCG 1000 MCG: 1000 TAB at 07:57

## 2022-01-31 RX ADMIN — GABAPENTIN 600 MG: 600 TABLET, FILM COATED ORAL at 21:38

## 2022-01-31 RX ADMIN — SODIUM CHLORIDE 25 ML: 900 INJECTION INTRAVENOUS at 03:13

## 2022-01-31 RX ADMIN — AMOXICILLIN AND CLAVULANATE POTASSIUM 1 TABLET: 875; 125 TABLET, FILM COATED ORAL at 11:41

## 2022-01-31 RX ADMIN — AMOXICILLIN AND CLAVULANATE POTASSIUM 1 TABLET: 875; 125 TABLET, FILM COATED ORAL at 21:43

## 2022-01-31 RX ADMIN — ACETAMINOPHEN 650 MG: 325 TABLET ORAL at 10:09

## 2022-01-31 RX ADMIN — AMPICILLIN AND SULBACTAM 1500 MG: 1; .5 INJECTION, POWDER, FOR SOLUTION INTRAMUSCULAR; INTRAVENOUS at 08:03

## 2022-01-31 RX ADMIN — DOCUSATE SODIUM 200 MG: 100 CAPSULE, LIQUID FILLED ORAL at 21:37

## 2022-01-31 RX ADMIN — LEVOTHYROXINE SODIUM ANHYDROUS 56 MCG: 100 INJECTION, POWDER, LYOPHILIZED, FOR SOLUTION INTRAVENOUS at 09:20

## 2022-01-31 RX ADMIN — ANORECTAL OINTMENT: 15.7; .44; 24; 20.6 OINTMENT TOPICAL at 06:14

## 2022-01-31 RX ADMIN — IPRATROPIUM BROMIDE AND ALBUTEROL SULFATE 1 AMPULE: 2.5; .5 SOLUTION RESPIRATORY (INHALATION) at 07:34

## 2022-01-31 RX ADMIN — SODIUM CHLORIDE, PRESERVATIVE FREE 10 ML: 5 INJECTION INTRAVENOUS at 21:53

## 2022-01-31 RX ADMIN — PANTOPRAZOLE SODIUM 40 MG: 40 TABLET, DELAYED RELEASE ORAL at 06:14

## 2022-01-31 RX ADMIN — OSELTAMIVIR PHOSPHATE 30 MG: 30 CAPSULE ORAL at 07:58

## 2022-01-31 RX ADMIN — ASPIRIN 81 MG: 81 TABLET, CHEWABLE ORAL at 07:57

## 2022-01-31 RX ADMIN — BISACODYL 10 MG: 10 SUPPOSITORY RECTAL at 07:58

## 2022-01-31 RX ADMIN — DEXTROSE MONOHYDRATE: 50 INJECTION, SOLUTION INTRAVENOUS at 04:50

## 2022-01-31 RX ADMIN — Medication 10 MG: at 21:37

## 2022-01-31 RX ADMIN — AMPICILLIN AND SULBACTAM 1500 MG: 1; .5 INJECTION, POWDER, FOR SOLUTION INTRAMUSCULAR; INTRAVENOUS at 03:14

## 2022-01-31 RX ADMIN — ATORVASTATIN CALCIUM 80 MG: 40 TABLET, FILM COATED ORAL at 21:37

## 2022-01-31 RX ADMIN — DOCUSATE SODIUM 200 MG: 100 CAPSULE, LIQUID FILLED ORAL at 07:57

## 2022-01-31 RX ADMIN — SODIUM CHLORIDE, PRESERVATIVE FREE 5 ML: 5 INJECTION INTRAVENOUS at 07:58

## 2022-01-31 RX ADMIN — IPRATROPIUM BROMIDE AND ALBUTEROL SULFATE 1 AMPULE: 2.5; .5 SOLUTION RESPIRATORY (INHALATION) at 16:26

## 2022-01-31 RX ADMIN — ACETAMINOPHEN 650 MG: 325 TABLET ORAL at 17:15

## 2022-01-31 ASSESSMENT — ENCOUNTER SYMPTOMS
NAUSEA: 0
ABDOMINAL DISTENTION: 1
SHORTNESS OF BREATH: 0
CONSTIPATION: 1
CHEST TIGHTNESS: 0
DIARRHEA: 0
ABDOMINAL PAIN: 0
COUGH: 0
VOMITING: 0

## 2022-01-31 ASSESSMENT — PAIN SCALES - GENERAL
PAINLEVEL_OUTOF10: 0
PAINLEVEL_OUTOF10: 8
PAINLEVEL_OUTOF10: 4
PAINLEVEL_OUTOF10: 0
PAINLEVEL_OUTOF10: 3

## 2022-01-31 ASSESSMENT — PAIN DESCRIPTION - PROGRESSION
CLINICAL_PROGRESSION: NOT CHANGED

## 2022-01-31 ASSESSMENT — PAIN DESCRIPTION - PAIN TYPE
TYPE: CHRONIC PAIN;ACUTE PAIN
TYPE: CHRONIC PAIN
TYPE: CHRONIC PAIN

## 2022-01-31 ASSESSMENT — PAIN DESCRIPTION - FREQUENCY
FREQUENCY: CONTINUOUS
FREQUENCY: CONTINUOUS
FREQUENCY: INTERMITTENT

## 2022-01-31 ASSESSMENT — PAIN DESCRIPTION - LOCATION
LOCATION: LEG

## 2022-01-31 ASSESSMENT — PAIN DESCRIPTION - ONSET
ONSET: GRADUAL
ONSET: ON-GOING
ONSET: ON-GOING

## 2022-01-31 ASSESSMENT — PAIN DESCRIPTION - DESCRIPTORS
DESCRIPTORS: ACHING;CONSTANT
DESCRIPTORS: ACHING
DESCRIPTORS: ACHING;CONSTANT

## 2022-01-31 ASSESSMENT — PAIN DESCRIPTION - ORIENTATION
ORIENTATION: RIGHT;LEFT

## 2022-01-31 ASSESSMENT — PAIN - FUNCTIONAL ASSESSMENT
PAIN_FUNCTIONAL_ASSESSMENT: PREVENTS OR INTERFERES SOME ACTIVE ACTIVITIES AND ADLS
PAIN_FUNCTIONAL_ASSESSMENT: PREVENTS OR INTERFERES SOME ACTIVE ACTIVITIES AND ADLS

## 2022-01-31 NOTE — PROGRESS NOTES
Progress Note    Admit Date: 1/26/2022  Diet: ADULT ORAL NUTRITION SUPPLEMENT; Breakfast, Lunch; Standard High Calorie/High Protein Oral Supplement  ADULT ORAL NUTRITION SUPPLEMENT; Dinner, Breakfast; Wound Healing Oral Supplement  ADULT DIET; Dysphagia - Minced and Moist; Low Sodium (2 gm)    CC: fall    Interval history: No acute events overnight. Patient remains afebrile with stable vitals. Now on 1L oxygen however SpO2 96%. Patient was seen at bedside this morning, patient is fairly responsive and answering all questions asked. Hypernatremia resolved after D5, 144 this morning. Patient is tolerating diet well with no issues.     Medications:     Scheduled Meds:   ampicillin-sulbactam  1,500 mg IntraVENous Q6H    ipratropium-albuterol  1 ampule Inhalation Q4H WA    levothyroxine  56 mcg IntraVENous Daily    And    sodium chloride (PF)  5 mL IntraVENous Daily    aspirin  81 mg Oral Daily    pantoprazole  40 mg Oral QAM AC    vitamin B-12  1,000 mcg Oral Daily    gabapentin  600 mg Oral BID    atorvastatin  80 mg Oral Nightly    sodium chloride flush  5-40 mL IntraVENous 2 times per day    enoxaparin  40 mg SubCUTAneous Daily    docusate sodium  200 mg Oral BID    bisacodyl  10 mg Rectal Daily    oseltamivir  30 mg Oral BID     Continuous Infusions:   sodium chloride 25 mL (01/31/22 0313)     PRN Meds:menthol-zinc oxide, sodium chloride (Inhalant), melatonin, dicyclomine, albuterol, guaiFENesin, sodium chloride flush, sodium chloride, ondansetron **OR** ondansetron, polyethylene glycol, acetaminophen **OR** acetaminophen    Objective:   Vitals:   T-max:  Patient Vitals for the past 8 hrs:   BP Temp Temp src Pulse Resp SpO2 Weight   01/31/22 0755 (!) 111/58 97.8 °F (36.6 °C) Oral 56 20 94 %    01/31/22 0736     16 95 %    01/31/22 0616       215 lb 2.7 oz (97.6 kg)   01/31/22 0315 126/68 98.2 °F (36.8 °C) Oral 78 11 95 %    01/31/22 0221    64          Intake/Output Summary (Last 24 hours) at 1/31/2022 0853  Last data filed at 1/31/2022 0612  Gross per 24 hour   Intake 720 ml   Output 1425 ml   Net -705 ml       Review of Systems   Constitutional: Positive for chills and fatigue. Negative for fever. Respiratory: Negative for cough, chest tightness and shortness of breath. Cardiovascular: Positive for palpitations. Negative for chest pain and leg swelling. Gastrointestinal: Positive for abdominal distention and constipation. Negative for abdominal pain, diarrhea, nausea and vomiting. Genitourinary: Negative for difficulty urinating. Neurological: Positive for dizziness, weakness and light-headedness. Physical Exam  Constitutional:       General: He is not in acute distress. Appearance: Normal appearance. HENT:      Head: Normocephalic and atraumatic. Nose: Nose normal.   Eyes:      Conjunctiva/sclera: Conjunctivae normal.      Pupils: Pupils are equal, round, and reactive to light. Cardiovascular:      Rate and Rhythm: Regular rhythm. Bradycardia present. Pulses: Normal pulses. Heart sounds: No murmur heard. No friction rub. No gallop. Pulmonary:      Effort: Pulmonary effort is normal. No respiratory distress. Breath sounds: Normal breath sounds. No wheezing. Abdominal:      General: Abdomen is flat. Bowel sounds are normal. There is distension. Hernia: A hernia is present. Musculoskeletal:         General: Swelling and tenderness present. Cervical back: Normal range of motion. Skin:     General: Skin is warm and dry. Neurological:      Mental Status: He is alert. Cranial Nerves: No cranial nerve deficit. Sensory: Sensation is intact.       Comments: Oriented to person and place not time         LABS:    CBC:   Recent Labs     01/29/22  0725 01/30/22  0612 01/31/22  0431   WBC 12.9* 13.5* 12.2*   HGB 8.7* 8.9* 8.2*   HCT 27.4* 28.2* 25.8*   * 127* 110*   MCV 88.7 90.1 88.4     Renal:    Recent Labs 01/29/22  0725 01/29/22  0725 01/30/22  0612 01/30/22  0612 01/30/22  1512 01/30/22  2143 01/31/22  0432   *   < > 149*   < > 148* 145 144   K 4.6  --  4.6  --   --   --  4.1   *  --  114*  --   --   --  111*   CO2 26  --  26  --   --   --  25   BUN 42*  --  36*  --   --   --  33*   CREATININE 1.5*  --  1.5*  --   --   --  1.3   GLUCOSE 85  --  101*  --   --   --  111*   CALCIUM 8.7  --  8.8  --   --   --  8.2*   ANIONGAP 8  --  9  --   --   --  8    < > = values in this interval not displayed. Hepatic:   No results for input(s): AST, ALT, BILITOT, BILIDIR, PROT, LABALBU, ALKPHOS in the last 72 hours. Troponin:   No results for input(s): TROPONINI in the last 72 hours. BNP: No results for input(s): BNP in the last 72 hours. Lipids: No results for input(s): CHOL, HDL in the last 72 hours. Invalid input(s): LDLCALCU, TRIGLYCERIDE  ABGs:    Recent Labs     01/28/22  1458   PHART 7.391   MYS7HMO 41.1   PO2ART 72.4*   CMD1KXD 25.0   BEART 0   C1HLATVX 94   GDH3TVS 26       INR: No results for input(s): INR in the last 72 hours. Lactate:   Recent Labs     01/28/22  1458   LACTATE 0.58     Cultures:  -----------------------------------------------------------------  RAD:   XR CHEST PORTABLE   Final Result      1. Stable left basilar pleural-parenchymal opacities and bilateral interstitial opacities. CT CHEST ABDOMEN PELVIS W CONTRAST   Final Result     Findings likely consistent with stercoral proctitis. Cystitis    also suspected.       _______________________________________________       IMPRESSION:          Masslike consolidation of the left lung base which could be    secondary to a pneumonia though underlying neoplasm considered. Nearby pleural fluid collection concerning for an empyema. XR CHEST PORTABLE   Final Result      1. Decreased bilateral interstitial opacities which may represent improved interstitial edema. 2.  Mild cardiomegaly.    3.  Chronic left basilar pleural and parenchymal opacities, unchanged         XR TIBIA FIBULA RIGHT (2 VIEWS)   Final Result      Pelvis and right hip:   1. Diffuse demineralization limits evaluation. No evidence of acute fracture. 2.  Status post right femur ORIF without evidence of hardware complication. Right tib-fib:   1. No acute osseous findings. XR HIP 2-3 VW W PELVIS RIGHT   Final Result      Pelvis and right hip:   1. Diffuse demineralization limits evaluation. No evidence of acute fracture. 2.  Status post right femur ORIF without evidence of hardware complication. Right tib-fib:   1. No acute osseous findings. CT Head WO Contrast   Final Result      Head   1. No evidence acute intracranial hemorrhage or mass effect. 2.  Mild to moderate atrophy. Cervical spine   1. No evidence of acute fracture or malalignment. 2.  Moderate multilevel cervical spondylosis. CT Cervical Spine WO Contrast   Final Result      Head   1. No evidence acute intracranial hemorrhage or mass effect. 2.  Mild to moderate atrophy. Cervical spine   1. No evidence of acute fracture or malalignment. 2.  Moderate multilevel cervical spondylosis. XR HIP 2-3 VW W PELVIS RIGHT    (Results Pending)       Assessment/Plan:     1. Symptomatic Bradycardia likely 2/2 Hypothermia, resolved   Patient was found down for unknown amount of time. Patient has previous hx of bradycardia with HR down to 30s in previous hospitalizations. Was supposed to have a CAM monitor placed which wasn't placed. Patient is not on any remington agents. Bradycardia is improving with improvements in body temperature. - On telemetry   - Omaira NICOLE followin week CAM on discharge    2. Hypothermia, resolved  Likely 2/2 Hypothyroidism or Anemia vs Influenza   Patient was hypothermic on arrival with T 90.3. He reports fatigue, constipation, and difficulty concentrating. TSH 5.19 in 2022 with normal T4.  Denies weight gain, reporting weight loss. CBC showed signs of anemia with Hgb of 8.6 and Hct 26.9. Had fall with unknown fall time. Currently T 97.9.  - TSH 5.4 with reflex to T4 1, started on synthyroid 75 mcg  - Normal cortisol levels, recent 12   - Influenza B positive, continue Tamiflu 30 mg BID    3. Altered mental status, resolved   Patient was less responsive last afternoon and had a drop in O2 saturation. A stat EKG and chest Xray was ordered which were stable. Patient was also having a weak cough with concerns of aspiration, respiratory therapist and Dr Maddie Mays was at bedside with improvement in patients status thereafter following deep suctioning  -s/p passed bedside swallow this morning   -started on dysphagia diet as per recommendations   -Stop LR, s/p D5 in the setting of hypernatremia, which is corrected   -s/p unasyn for concerns of aspiration, started on PO augmentin every 12 hrs     4. Rule out Sepsis   Patient was hypothermic on arrival with bradycardia and intermittently confused. - Procalcitonin 0.12  - Blood cultures show NGTD   - On Augmentin every 12 hrs     5. Cystitis  UA on 1/17 was cloudy with 10-20 WBC and moderate leukocyte esterase. UA today showed 3+ bacteria and trace leukocyte esterase. CT Abd/Pelvis showed thickening of the bladder consistent with possible cystitis. Patient has chronic schneider catheter which was replaced 10 days ago. - Replaced schneider catheter  - urine cultures positive for Trichosporon and E fecalis, s/p Unasyn   - F/u sensitivities    6. Troponinemia  - Troponin 0.05 on arrival, recent 0.11, s/p EKG which showed no new changes from prior    7. Constipation  Patient reports that he has been constipated for 2 weeks. - Home meds  - started on dulcolax suppository   - Continue to monitor    8.  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 Racheal Mendoza care following     9. BL LE Neuropathy  - Continue Gabapentin    10. Concern for malignancy  - Patient has a smoking history. Denies any alcohol use. Reports anorexia and recent weight loss. He denies hemoptysis currently. - CT Abd/Pelvis/Chest showed left lower lobe consolidation suspicious of pneumonia vs malignancy  -consulted pulmonology: mass likely 2/2 rounded atelectasis, no further pulm w/o required      Code Status: DNR-CCA   FEN: ADULT ORAL NUTRITION SUPPLEMENT; Breakfast, Lunch; Standard High Calorie/High Protein Oral Supplement  ADULT ORAL NUTRITION SUPPLEMENT; Dinner, Breakfast; Wound Healing Oral Supplement  ADULT DIET;  Dysphagia - Minced and Moist; Low Sodium (2 gm)  PPX: Lovenox  DISPO: Beto Stone MD, PGY-1  01/31/22  8:53 AM    This patient has been staffed and discussed with Bhavna Clinton MD.

## 2022-01-31 NOTE — PROGRESS NOTES
Pulmonary Progress Note        Subjective:   No acute events overnight. Patient seen and examined at [facility]. Patient's chart and notes were reviewed. Currently on ORA SpO2 >95%. Patient reports no SOB  States he has dec appetite in the past 2 weeks, he states he may have lost weight recently but cannot quantify how muchas Walter denies any fevers, chills, night sweats  Patient denies chest pain, chills, nausea, vomiting He denies urinary frequency, dysuria. Patient is hemodynamically stable and afebrile. Past Medical History:      Diagnosis Date    BPH (benign prostatic hyperplasia)     Carotid stenosis 12/2013    JESU 99-05% stenosis; LICA 01-10% stenosis    Cellulitis 12/2013    LLE    CHF (congestive heart failure) ([facility])     Chronic back pain     Diverticular disease     GERD (gastroesophageal reflux disease)     History of atrial fibrillation     Hypercholesteremia     Hypertension     Lower GI bleed     MDRO (multiple drug resistant organisms) resistance 10/26/2019    urine    Neuromuscular disorder (HCC)     spasticity    Renal insufficiency     Vitamin B12 deficiency       Past Surgical History:        Procedure Laterality Date    BACK SURGERY  2006    lower lumbar    CORONARY ARTERY BYPASS GRAFT  12/13/2013    CABG x 5 (Dr Haritha Reynoso), svg to diag, om1 and 3, distal rca, kelly to lad.      CYSTOSCOPY N/A 1/10/2019    CYSTOSCOPY performed by Jose Christy MD at [facility] Right 5/1/2015    ORIF    LEG SURGERY Right 11/2/2021    RIGHT LOWER EXTREMITY ADVANCEMENT FLAP AND SPLIT THICKNESS SKIN GRAFT PLACEMENT; (WOUND- 10 CM X 5.5 CM; CLOSURE- 6 CM X 5.5 CM; SKIN GRAFT- 7.2 CM X 5 CM) performed by Veronica Dougherty MD at [address] [place] [address] 6/11/2020    [facility] performed by Renetta Anderson MD at [address] [facility] 5/4/2020    EGD BIOPSY performed by Renetta Anderson MD at [facility]     Current Medications:     [START ON 2022] levothyroxine  75 mcg Oral Daily    amoxicillin-clavulanate  1 tablet Oral 2 times per day    ipratropium-albuterol  1 ampule Inhalation Q4H WA    aspirin  81 mg Oral Daily    pantoprazole  40 mg Oral QAM AC    vitamin B-12  1,000 mcg Oral Daily    gabapentin  600 mg Oral BID    atorvastatin  80 mg Oral Nightly    sodium chloride flush  5-40 mL IntraVENous 2 times per day    enoxaparin  40 mg SubCUTAneous Daily    docusate sodium  200 mg Oral BID    bisacodyl  10 mg Rectal Daily    oseltamivir  30 mg Oral BID     Allergies: Allergies   Allergen Reactions    Bactrim [Sulfamethoxazole-Trimethoprim] Rash     Social History:    TOBACCO:   reports that he quit smoking about 52 years ago. He has never used smokeless tobacco.  ETOH:   reports no history of alcohol use. Family History:       Problem Relation Age of Onset    Heart Disease Father        Review of Systems  A 10 point review of systems was conducted, significant findings as noted in HPI. Objective:   PHYSICAL EXAM:      VITALS:  BP (!) 111/58   Pulse 56   Temp 97.8 °F (36.6 °C) (Oral)   Resp 20   Ht 5' 11\" (1.803 m)   Wt 215 lb 2.7 oz (97.6 kg)   SpO2 94%   BMI 30.01 kg/m²   24HR INTAKE/OUTPUT:      Intake/Output Summary (Last 24 hours) at 2022 1009  Last data filed at 2022 0612  Gross per 24 hour   Intake 720 ml   Output 1425 ml   Net -705 ml     CURRENT PULSE OXIMETRY:  SpO2: 94 %  24HR PULSE OXIMETRY RANGE:  SpO2  Av.1 %  Min: 94 %  Max: 96 % on room air    Physical Exam  HENT:      Head: Normocephalic and atraumatic. Nose: Nose normal.      Mouth/Throat:      Mouth: Mucous membranes are dry. Pharynx: Oropharynx is clear. Cardiovascular:      Rate and Rhythm: Normal rate and regular rhythm. Pulses: Normal pulses. Pulmonary:      Effort: Pulmonary effort is normal.      Breath sounds: Normal breath sounds.    Abdominal:      General: Abdomen is flat. Musculoskeletal:         General: Normal range of motion. Skin:     General: Skin is warm and dry. Neurological:      Mental Status: He is alert. DATA:    Old records have been reviewed  CBC with Differential:    Lab Results   Component Value Date    WBC 12.2 01/31/2022    RBC 2.91 01/31/2022    HGB 8.2 01/31/2022    HCT 25.8 01/31/2022     01/31/2022    MCV 88.4 01/31/2022    MCH 28.3 01/31/2022    MCHC 32.0 01/31/2022    RDW 18.5 01/31/2022    BANDSPCT 2 01/03/2019    LYMPHOPCT 7.5 01/31/2022    MONOPCT 7.1 01/31/2022    MYELOPCT 1 01/03/2019    BASOPCT 0.2 01/31/2022    MONOSABS 0.9 01/31/2022    LYMPHSABS 0.9 01/31/2022    EOSABS 0.4 01/31/2022    BASOSABS 0.0 01/31/2022     BMP:    Lab Results   Component Value Date     01/31/2022    K 4.1 01/31/2022     01/31/2022    CO2 25 01/31/2022    BUN 33 01/31/2022    CREATININE 1.3 01/31/2022    CALCIUM 8.2 01/31/2022    GFRAA >60 01/31/2022    LABGLOM 53 01/31/2022    GLUCOSE 111 01/31/2022     Hepatic Function Panel:    Lab Results   Component Value Date    ALKPHOS 168 01/26/2022    ALT 30 01/26/2022    AST 30 01/26/2022    PROT 7.8 01/26/2022    BILITOT 0.4 01/26/2022    BILIDIR <0.2 06/20/2021    IBILI see below 06/20/2021     ABG:    Lab Results   Component Value Date    OET8YPT 25.0 01/28/2022    BEART 0 01/28/2022    D5POMQFW 94 01/28/2022    PHART 7.391 01/28/2022    TOG6DGT 41.1 01/28/2022    PO2ART 72.4 01/28/2022    OYQ7WYA 26 01/28/2022       Cultures:   Blood Culture:    Sputum Culture:        Radiology Review:  All pertinent images / reports were reviewed as a part of this visit. Imaging reveals the following:  XR CHEST PORTABLE   Final Result      1. Stable left basilar pleural-parenchymal opacities and bilateral interstitial opacities. CT CHEST ABDOMEN PELVIS W CONTRAST   Final Result     Findings likely consistent with stercoral proctitis. Cystitis    also suspected. _______________________________________________       IMPRESSION:          Masslike consolidation of the left lung base which could be    secondary to a pneumonia though underlying neoplasm considered. Nearby pleural fluid collection concerning for an empyema. XR CHEST PORTABLE   Final Result      1. Decreased bilateral interstitial opacities which may represent improved interstitial edema. 2.  Mild cardiomegaly. 3.  Chronic left basilar pleural and parenchymal opacities, unchanged         XR TIBIA FIBULA RIGHT (2 VIEWS)   Final Result      Pelvis and right hip:   1. Diffuse demineralization limits evaluation. No evidence of acute fracture. 2.  Status post right femur ORIF without evidence of hardware complication. Right tib-fib:   1. No acute osseous findings. XR HIP 2-3 VW W PELVIS RIGHT   Final Result      Pelvis and right hip:   1. Diffuse demineralization limits evaluation. No evidence of acute fracture. 2.  Status post right femur ORIF without evidence of hardware complication. Right tib-fib:   1. No acute osseous findings. CT Head WO Contrast   Final Result      Head   1. No evidence acute intracranial hemorrhage or mass effect. 2.  Mild to moderate atrophy. Cervical spine   1. No evidence of acute fracture or malalignment. 2.  Moderate multilevel cervical spondylosis. CT Cervical Spine WO Contrast   Final Result      Head   1. No evidence acute intracranial hemorrhage or mass effect. 2.  Mild to moderate atrophy. Cervical spine   1. No evidence of acute fracture or malalignment. 2.  Moderate multilevel cervical spondylosis. XR HIP 2-3 VW W PELVIS RIGHT    (Results Pending)        Assessment/Plan   [de-identified] y.o. male with     Aspiration PNA  1/28 patient less responsive and desat. CXR with stable left basilar pleural-parencymal opacities and bilateral interstitial opacities. Improved after deep suctioning  - patient was started on unasyn for concerns of aspiration  - changed to augmentin today  - currently on room air satting appropriately    Mass like consolidation of the of left lung base likely rounded atelectasis  CT AP with mass like consolidation of the left lung base which could be   secondary to a pneumonia though underlying neoplasm considered. Nearby pleural fluid collection concerning for an empyema. Pt has a 10 year smoking history from 4819-6162. He denies any chemical exposure. 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 PNA less likely pt without fever or leukocytosis procal 0.12, WNL  Consolidation present on prior CXR and prior CT abdomen in 2019 and 2017  - no further pulmonary workup required    Influenza B positive  - continue on tamiflu    Thank you for the opportunity to participate in [address]. Plan and assessment discussed with attending, Dr. Homero Yu.      Jamison Mclean MD, PGY1  1/31/2022  10:09 AM     Pulm    Patient improved  On room air  Finish a 7-day course of Augmentin  Finished a 5-day course of Tamiflu    Will sign off    Waverly Schlatter MD

## 2022-01-31 NOTE — PROGRESS NOTES
Speech Language Pathology  Facility/Department: [facility] PCU  Dysphagia Daily Treatment Note    NAME: Oswaldo Wallace  : 1940  MRN: [id number]    Patient Diagnosis(es):   Patient Active Problem List    Diagnosis Date Noted    Symptomatic sinus bradycardia 2022    Bilateral leg edema 2022    Symptomatic bradycardia     Weakness 2022    Pain of right lower extremity     Degloving injury     Leg laceration, unspecified laterality, initial encounter 10/31/2021    Altered mental status     Hyperkalemia     Encephalopathy acute 2021    Acute renal failure superimposed on stage 3 chronic kidney disease ([address] 02/15/2021    Urinary tract infection associated with indwelling urethral catheter ([address] 02/15/2021    Acute cystitis with hematuria 2021    Abnormal angiogram 2020    H/O angiography 2020    Abnormal stress test 2020    Constipation 2020    Encopresis with constipation and overflow incontinence 2020    Cellulitis of scrotum 2020    Acute renal injury ([address] 2020    Pseudomonas infection 2020    Dyspnea     Bradycardia     CHF with unknown LVEF ([address] 2020    Gross hematuria 2019    Chronic indwelling Iglesias catheter 2019    Hyperlipidemia 2019    Bilateral lower leg cellulitis 2019    Neurogenic bladder 2019    Bacteriuria 2019    Abnormality of urethral meatus 2019    Acute encephalopathy 10/08/2019    Problem with urinary catheter ([address] 10/07/2019    Edema     Complicated UTI (urinary tract infection) 2019    Hypothermia 2019    Acute low back pain without sciatica 2017    Hip fracture, right ([address] 2015    Acute right hip pain     Lower GI bleeding 11/10/2014    CAD (coronary artery disease) 2014    S/P CABG x 5 2014    Cellulitis 2013    Essential hypertension 2013    GERD (gastroesophageal reflux disease) 12/16/2013    Acute blood loss anemia 12/16/2013    Tinea corporis 12/16/2013    Carotid stenosis 12/16/2013    Hypotension 11/30/2013    Light headed 11/30/2013     Allergies: Allergies   Allergen Reactions    Bactrim [Sulfamethoxazole-Trimethoprim] Rash       Recent Chest Xray: (01/29/2022)  Impression       1.  Stable left basilar pleural-parenchymal opacities and bilateral interstitial opacities.      Recent CT Head: (01/26/2022)      Impression       Head   1.  No evidence acute intracranial hemorrhage or mass effect. 2.  Mild to moderate atrophy.       Cervical spine   1.  No evidence of acute fracture or malalignment. 2.  Moderate multilevel cervical spondylosis.      Prior MBS (02/18/2021)  Oral Phase: Mild oral deficits characterized by weak lingual manipulation resulting in premature spillage to level of valleculae w/ all consistencies; mildly delayed swallow initiation and intermittent cues required to swallow. Piecemeal swallow noted x2 w/ thin liquids; statis of valleculae and cleared w/ secondary swallow. Pharyngeal: Mild pharyngeal phase characterized by vallecular residue that required secondary swallow to clear. No penetration or aspiration w/ any consistencies or trials. No pharyngeal residue. Upper Esophageal Screen: Adequate clearance of UES and proximal esophagus. Pt does endorsed acid reflux; further f/u w/ PCP or GI.       Chart reviewed. Medical Diagnosis: Symptomatic sinus bradycardia [R00.1]  Hypothermia, initial encounter [T68. XXXA]  Fall from bed, initial encounter [W06. XXXA]   Treatment Diagnosis: dysphagia    BSE Impression 1/29/22  Mild oral dysphagia 2/2 weakness and reduced dentition. Assessed tolerance ice chips, thins via cup/straw, puree, and soft solids. D/t manual dexterity issues, pt with improved liquid intake via cup w/ straw. Positive oral acceptance, mildly prolonged mastication, good oral clearance, no overt s/s aspiraiton. To facilitate oral phase, recommend dysphagia soft & bites-sized and thin liquids via straw, general aspiration precautions. Dysphagia Diagnosis: Mild oral stage dysphagia  MBS results - not indicated at this time    Pain: not indicated through any means    Current Diet : ADULT ORAL NUTRITION SUPPLEMENT; Breakfast, Lunch; Standard High Calorie/High Protein Oral Supplement  ADULT ORAL NUTRITION SUPPLEMENT; Dinner, Breakfast; Wound Healing Oral Supplement  ADULT DIET; Dysphagia - Minced and Moist; Low Sodium (2 gm)   Recommended Form of Meds: PO     Treatment:  Pt seen bedside to address the following goals:   Goal 1: Patient will tolerate least restrictive diet without overt signs of aspiration or associated decline in respiratory status. 1/30: pt sitting up in bed, feeding self breakfast, though appears pt would benefit from assistance, as many spills noted on tray. Pt confused, talking about working and needing . Pt required cues to discontinue talking while eating. Pt consumed puree and thin liquids via cup and straw with no overt signs of aspiration, voice remained clear. Pt demonstrated very prolonged mastication with oral residue occurring, when attempting to masticate bite sized sausage. No respiratory decline per chart review, pt on 1L O2. Recommend downgrading to dysphagia II minced and moist/thin liquids   Cont goal  1/31: pt with breakfast tray, consisting of cream of wheat/grits, multiple liquids and trial of hard solid. Pt exhibited adequate labial seal, no anterior loss of bolus. Pt exhibited prolonged mastication with hard solid, mild lingual residue. No oral residue/pocketing with the puree /soft solid items, cleared oral cavity completely. Occasional mild throat clear across PO trials, no coughing or change in voice occurred. No respiratory decline per chart review.  D/w RN and medical resident  Goal met, cont to ensure consistency  Goal 2: Patient/caregiver will demonstrate understanding of swallowing concerns/recommendations  1/30: pt educated to purpose of visit, does not indicate comprehension  Cont goal  1/31:  Pt educated to rationale for current diet, strategies for improved safety of swallow and instruction to notify staff if any signs of aspiration emerge. Pt stated partial comprehension  Cont goal     Patient/Family/Caregiver Education:  As above    Compensatory Strategies: Alternate solids and liquids  Eat/Feed slowly  Upright as possible for all oral intake  Assist feed  Small bites/sips      Plan:  Continue dysphagia treatment with goals per plan of care. Diet recommendations: cont dysphagia II minced and moist /thin liquids  DC recommendation: TBD  Treatment: 15  D/W nursing Serenity and medical resident  Needs met prior to leaving room, call button in reach. MISTI Holloway.S./CCC-SLP #[id number]  Pg.  # [phone]  If patient is discharged prior to next treatment, this note will serve as the discharge summary

## 2022-01-31 NOTE — PROGRESS NOTES
4 Eyes Admission Assessment     I agree as the admission nurse that 2 RN's have performed a thorough Head to Toe Skin Assessment on the patient. ALL assessment sites listed below have been assessed on admission. Areas assessed by both nurses:   [x]   Head, Face, and Ears   [x]   Shoulders, Back, and Chest  [x]   Arms, Elbows, and Hands -bruising  [x]   Coccyx, Sacrum, and Ischium- excoriation on coccyx  [x]   Legs, Feet, and Heels- venous stasis ulcer on R leg        Does the Patient have Skin Breakdown?   Yes a wound was noted on the Admission Assessment and an LDA was Initiated documentation include the Usha-wound, Wound Assessment, Measurements, Dressing Treatment, Drainage, and Color\",         Terrance Prevention initiated:  Yes   Wound Care Orders initiated:  Yes      [address]  nurse consulted for Pressure Injury (Stage 3,4, Unstageable, DTI, NWPT, and Complex wounds) or Terrance score 18 or lower:  Yes      Nurse 1 eSignature: Electronically signed by Mamta Ashton RN on 1/31/22 at 1:44 PM EST    **SHARE this note so that the co-signing nurse is able to place an eSignature**    Nurse 2 eSignature: Electronically signed by Digna Celeste RN on 1/31/22 at 5:47 PM EST

## 2022-01-31 NOTE — PROGRESS NOTES
Attempted to reach POC gilles Edwards [facility] this morning. Sent to [facility], unable to leave message.      Electronically signed by Michael Horton RN on 1/31/2022 at 7:04 AM

## 2022-01-31 NOTE — PLAN OF CARE
Problem: Falls - Risk of:  Goal: Will remain free from falls  Description: Will remain free from falls  1/31/2022 0257 by Ankita Barrera RN  Outcome: Ongoing  Note: Pt is a Fall Risk. See Marcus Holly Fall Risk Score. Pt bed in low position and side rails up. Call light and belongings in reach. Pt encouraged to call for assistance. Will continue with hourly rounds for PO intake, pain needs, toileting, and repositioning as needed. Problem: Skin Integrity:  Goal: Absence of new skin breakdown  Description: Absence of new skin breakdown  1/31/2022 0257 by Ankita Barrera RN  Outcome: Ongoing  Note: Pt repositioned q2 hours and rich care provided as needed. No new skin breakdown noted on this shift. Problem: Pain:  Goal: Control of acute pain  Description: Control of acute pain  Outcome: Ongoing     Problem: Psychomotor Activity - Altered:  Goal: Absence of psychomotor disturbance signs and symptoms  Description: Absence of psychomotor disturbance signs and symptoms  Outcome: Ongoing  Note: Pt has waxing and waning orientation. Able to redirect patient momentarily but soon back talking about random things and places and unable to assess orientation.

## 2022-01-31 NOTE — PROGRESS NOTES
VSS. A&Ox4. Droplet precautions for flu. x2-3 with stedy. R leg wrapped with kerlex. Daily dressing changes. IV normal saline locked. tolerating diet. PT/OT consulted. Alarm on. HOB 30. Feet elevated. Chronic schneider. Ulcers, see LDA. mepilex applied. Incontinence care given with zinc paste applied. q2h repositioning. DNR-CCA. freq BMs this afternoon. Call light within reach.  Will cont to monitor

## 2022-01-31 NOTE — CARE COORDINATION
BAYRON spoke with Indra at [facility], who is following. Patient will need a clear and safe discharge plan in place or they will not accept him at [facility]. Patient owes money to SNF, therefore won't be able to return. Patient has been non-compliant with getting enough care at home. CM to follow up with [employer] and pt's family on plan. BAYRON received a call from Dion Haynes ([phone]) from [facility], who is SW following. He would appreciate updates when pt has solid plan for discharge.     Christianne Irizarry, RN, BSN,   4th Floor Progressive Care Unit  [phone]

## 2022-01-31 NOTE — PROGRESS NOTES
Report called to [facility]. Receiving RN denies further questions.

## 2022-01-31 NOTE — PROGRESS NOTES
Physical Therapy  Facility/Department: [facility]  Daily Treatment Note  NAME: Maricel Barakat  : 1940  MRN: [id number]    Date of Service: 2022    Discharge Recommendations: Maricel Barakat scored a 6/24 on the AM-PAC short mobility form. Current research shows that an AM-PAC score of 17 or less is typically not associated with a discharge to the patient's home setting. Based on the patient's AM-PAC score and their current functional mobility deficits, it is recommended that the patient have 3-5 sessions per week of Physical Therapy at d/c to increase the patient's independence. Please see assessment section for further patient specific details. If patient discharges prior to next session this note will serve as a discharge summary. Please see below for the latest assessment towards goals. PT Equipment Recommendations  Other: defer    Assessment   Body structures, Functions, Activity limitations: Decreased functional mobility   Assessment: Pt requires heavy assist x 2 people for safe transfer OOB. Pt demos generalized weakness. Safety concerns for pt returning home. Rec continued IP PT with possible ECF for long term care. Will follow. Treatment Diagnosis: impaired mobility 2/2 fall  Prognosis: Fair  PT Education: Goals;PT Role;Plan of Care; Functional Mobility Training;Orientation;General Safety;Transfer Training  Patient Education: pt will require reinforcement  REQUIRES PT FOLLOW UP: Yes     Patient Diagnosis(es): The primary encounter diagnosis was Hypothermia, initial encounter. A diagnosis of Fall from bed, initial encounter was also pertinent to this visit.      has a past medical history of BPH (benign prostatic hyperplasia), Carotid stenosis, Cellulitis, CHF (congestive heart failure) ([address] Chronic back pain, Diverticular disease, GERD (gastroesophageal reflux disease), History of atrial fibrillation, Hypercholesteremia, Hypertension, Lower GI bleed, MDRO (multiple drug resistant organisms) resistance, Neuromuscular disorder ([facility] Renal insufficiency, and Vitamin B12 deficiency. has a past surgical history that includes back surgery (); Foot surgery; Coronary artery bypass graft (2013); hip surgery (Right, 2015); Cystoscopy (N/A, 1/10/2019); Upper gastrointestinal endoscopy (N/A, 2020); sigmoidoscopy (N/A, 2020); and Leg Surgery (Right, 2021). Restrictions  Position Activity Restriction  Other position/activity restrictions: up with assist  Subjective   General  Chart Reviewed: Yes  Additional Pertinent Hx: [de-identified] yo wheel chair bound male comes to the hospital after a fall. Pt with symptomatic bradycardia possibly caused by hypothermia. Recent hospitalization where SNF recommended but pt owes money to SNF and was unable to be placed. Subjective  Subjective: Pt found supine in bed, eating lunch. Agrees to PT. Reports min pain at buttock wounds. RN aware.           Orientation  Orientation  Overall Orientation Status: Within Functional Limits  Cognition      Objective   Bed mobility  Supine to Sit: Maximum assistance;2 Person assistance (HOB elevated, use of bedrail)  Scootin Person assistance;Maximal assistance  Transfers  Sit to Stand: Maximum Assistance;2 Person Assistance (from raised EOB and oral stedy seat x 2 trials)  Stand to sit: Maximum Assistance;2 Person Assistance  Bed to Chair: Dependent/Total (via oral stedy)        Balance  Sitting - Static: Poor  Sitting - Dynamic: Poor  Standing - Static: Poor  Comments: pt sat EOB with min to mod A due to posterior trunk lean; progressed to CGA for seated balance while in oral stedy  Exercises  Comments: x 10 B APs; x 5 B LAQ and seated marches with max v/c and tactile cues to stay on task                        G-Code     OutComes Score                                                     AM-PAC Score  AM-PAC Inpatient Mobility Raw Score : 6 (22 6645)  AM-PAC Inpatient T-Scale Score :

## 2022-01-31 NOTE — PROGRESS NOTES
Occupational Therapy  Facility/Department: [facility]'[address]  Daily Treatment Note  NAME: Renato Foss  : 1940  MRN: [id number]    Date of Service: 2022    Discharge Recommendations:  Renato Foss scored a 14/24 on the AM-PAC ADL Inpatient form. Current research shows that an AM-PAC score of 17 or less is typically not associated with a discharge to the patient's home setting. Based on the patient's AM-PAC score and their current ADL deficits, it is recommended that the patient have 3-5 sessions per week of Occupational Therapy at d/c to increase the patient's independence. Please see assessment section for further patient specific details. If patient discharges prior to next session this note will serve as a discharge summary. Please see below for the latest assessment towards goals. OT Equipment Recommendations  Other: Defer to next level of care    Assessment   Performance deficits / Impairments: Decreased functional mobility ; Decreased ADL status; Decreased endurance;Decreased balance;Decreased strength;Decreased cognition  Assessment: Pt demo gross weakness and deconditioning. Pt requires assist of 2 and Chanel Frizzle stedy for transfers and brief standing for toileting hygiene with total assist. Safety concerns exist should pt d/c home alone. Recommend ongoing OT tx at d/c  Treatment Diagnosis: Impaired ADL, functional mobility  REQUIRES OT FOLLOW UP: Yes  Activity Tolerance  Activity Tolerance: Patient Tolerated treatment well  Safety Devices  Safety Devices in place: Yes  Type of devices: Left in chair;Call light within reach; Chair alarm in place;Nurse notified (recommend Chanel Frizzle stedy and assist of 2-3 for return to bed; RN aware, whiteboard updated)         Restrictions  Position Activity Restriction  Other position/activity restrictions: up with assist     Subjective   General  Chart Reviewed: Yes  Patient assessed for rehabilitation services?: Yes  Additional Pertinent Hx: [de-identified] yo wheel chair bound male comes to the hospital after a fall. Pt with symptomatic bradycardia possibly caused by hypothermia. Recent hospitalization where SNF recommended but pt owes money to SNF and was unable to be placed. PMH includes: carotid stenosis, CAD, CABG, heart failure, hypertension  Family / Caregiver Present: No  Referring Practitioner: Dr. Leeann Santos  Diagnosis: Symptomatic sinus bradycardia  Subjective  Subjective: Pt in bed eating lunch, agreeable to OT treatment. Pain: buttock wound pain, RN aware      Orientation  Orientation  Overall Orientation Status: Within Functional Limits     Objective    ADL  LE Dressing: Dependent/Total (don socks; dressing on R LE foot/lower leg)  Toileting: Dependent/Total (incontinent of BM- pt unaware; total assist hygiene when standing in [facility] with max A of another for balance)           Balance  Sitting Balance:  Moderate assistance (-min A at eob x5 minutes, posterior lean; progressed to cga with UE supported seated in [facility])  Standing Balance: Dependent/Total (max a of 2 brief static stance in [facility] x3 reps)       Bed mobility  Supine to Sit: Maximum assistance;2 Person assistance (HOB elevated, use of bedrail)  Scootin Person assistance;Maximal assistance       Transfers  Sit to stand: Dependent/Total (max A of 2 from elevated bed to [place]; from stedy seat x2 reps)  Stand to sit: Dependent/Total (max A of 2 from [facility] stedy to recliner)  Transfer Comments: Dependent bed>chair via [facility] E per week: 2-5  Current Treatment Recommendations: Strengthening,ROM,Balance Training,Functional Mobility Training,Endurance Training,Self-Care / ADL           AM-PAC Score        AM-PAC Inpatient Daily Activity Raw Score: 14 (22 1504)  AM-PAC Inpatient ADL T-Scale Score : 33.39 (22 1504)  ADL Inpatient CMS 0-100% Score: 59.67 (22 1504)  ADL Inpatient CMS G-Code Modifier : CK (22 150)    Goals  Short term goals  Time Frame for Short term goals: Discharge  Short term goal 1: Transfer to/from 3 in 1 commode with mod assist of 2- not met  Short term goal 2: Stance with mod assist of for 30 sec x3 to assist with ADL and transfers- not met  Patient Goals   Patient goals : Get stronger       Therapy Time   Individual Concurrent Group Co-treatment   Time In 1412         Time Out 1452         Minutes 40         Timed Code Treatment Minutes: [address] David [place], OT

## 2022-02-01 LAB
ANION GAP SERPL CALCULATED.3IONS-SCNC: 10 MMOL/L (ref 3–16)
BASOPHILS ABSOLUTE: 0 K/UL (ref 0–0.2)
BASOPHILS RELATIVE PERCENT: 0.2 %
BUN BLDV-MCNC: 32 MG/DL (ref 7–20)
CALCIUM SERPL-MCNC: 8.2 MG/DL (ref 8.3–10.6)
CHLORIDE BLD-SCNC: 110 MMOL/L (ref 99–110)
CO2: 23 MMOL/L (ref 21–32)
CREAT SERPL-MCNC: 1.2 MG/DL (ref 0.8–1.3)
EOSINOPHILS ABSOLUTE: 0.8 K/UL (ref 0–0.6)
EOSINOPHILS RELATIVE PERCENT: 6.5 %
GFR AFRICAN AMERICAN: >60
GFR NON-AFRICAN AMERICAN: 58
GLUCOSE BLD-MCNC: 93 MG/DL (ref 70–99)
HCT VFR BLD CALC: 25.1 % (ref 40.5–52.5)
HEMOGLOBIN: 7.9 G/DL (ref 13.5–17.5)
LYMPHOCYTES ABSOLUTE: 0.8 K/UL (ref 1–5.1)
LYMPHOCYTES RELATIVE PERCENT: 6.3 %
MCH RBC QN AUTO: 28 PG (ref 26–34)
MCHC RBC AUTO-ENTMCNC: 31.6 G/DL (ref 31–36)
MCV RBC AUTO: 88.6 FL (ref 80–100)
MONOCYTES ABSOLUTE: 0.5 K/UL (ref 0–1.3)
MONOCYTES RELATIVE PERCENT: 4.3 %
NEUTROPHILS ABSOLUTE: 10.2 K/UL (ref 1.7–7.7)
NEUTROPHILS RELATIVE PERCENT: 82.7 %
PDW BLD-RTO: 18.7 % (ref 12.4–15.4)
PLATELET # BLD: 110 K/UL (ref 135–450)
PMV BLD AUTO: 9.6 FL (ref 5–10.5)
POTASSIUM REFLEX MAGNESIUM: 4.7 MMOL/L (ref 3.5–5.1)
RBC # BLD: 2.84 M/UL (ref 4.2–5.9)
SODIUM BLD-SCNC: 143 MMOL/L (ref 136–145)
WBC # BLD: 12.3 K/UL (ref 4–11)

## 2022-02-01 PROCEDURE — 6370000000 HC RX 637 (ALT 250 FOR IP): Performed by: STUDENT IN AN ORGANIZED HEALTH CARE EDUCATION/TRAINING PROGRAM

## 2022-02-01 PROCEDURE — 6370000000 HC RX 637 (ALT 250 FOR IP)

## 2022-02-01 PROCEDURE — 6360000002 HC RX W HCPCS

## 2022-02-01 PROCEDURE — 6370000000 HC RX 637 (ALT 250 FOR IP): Performed by: INTERNAL MEDICINE

## 2022-02-01 PROCEDURE — 36415 COLL VENOUS BLD VENIPUNCTURE: CPT

## 2022-02-01 PROCEDURE — 80048 BASIC METABOLIC PNL TOTAL CA: CPT

## 2022-02-01 PROCEDURE — 2580000003 HC RX 258

## 2022-02-01 PROCEDURE — 1200000000 HC SEMI PRIVATE

## 2022-02-01 PROCEDURE — 85025 COMPLETE CBC W/AUTO DIFF WBC: CPT

## 2022-02-01 RX ADMIN — PANTOPRAZOLE SODIUM 40 MG: 40 TABLET, DELAYED RELEASE ORAL at 09:39

## 2022-02-01 RX ADMIN — CYANOCOBALAMIN TAB 1000 MCG 1000 MCG: 1000 TAB at 09:35

## 2022-02-01 RX ADMIN — DOCUSATE SODIUM 200 MG: 100 CAPSULE, LIQUID FILLED ORAL at 09:35

## 2022-02-01 RX ADMIN — AMOXICILLIN AND CLAVULANATE POTASSIUM 1 TABLET: 875; 125 TABLET, FILM COATED ORAL at 09:35

## 2022-02-01 RX ADMIN — OSELTAMIVIR PHOSPHATE 30 MG: 30 CAPSULE ORAL at 00:03

## 2022-02-01 RX ADMIN — SODIUM CHLORIDE, PRESERVATIVE FREE 10 ML: 5 INJECTION INTRAVENOUS at 09:40

## 2022-02-01 RX ADMIN — GABAPENTIN 600 MG: 600 TABLET, FILM COATED ORAL at 09:35

## 2022-02-01 RX ADMIN — AMOXICILLIN AND CLAVULANATE POTASSIUM 1 TABLET: 875; 125 TABLET, FILM COATED ORAL at 22:20

## 2022-02-01 RX ADMIN — ENOXAPARIN SODIUM 40 MG: 100 INJECTION SUBCUTANEOUS at 09:35

## 2022-02-01 RX ADMIN — ASPIRIN 81 MG: 81 TABLET, CHEWABLE ORAL at 09:36

## 2022-02-01 RX ADMIN — GUAIFENESIN 600 MG: 600 TABLET, EXTENDED RELEASE ORAL at 22:19

## 2022-02-01 RX ADMIN — LEVOTHYROXINE SODIUM 75 MCG: 0.07 TABLET ORAL at 09:35

## 2022-02-01 RX ADMIN — DOCUSATE SODIUM 200 MG: 100 CAPSULE, LIQUID FILLED ORAL at 22:19

## 2022-02-01 RX ADMIN — ATORVASTATIN CALCIUM 80 MG: 40 TABLET, FILM COATED ORAL at 22:20

## 2022-02-01 RX ADMIN — SODIUM CHLORIDE, PRESERVATIVE FREE 10 ML: 5 INJECTION INTRAVENOUS at 23:46

## 2022-02-01 RX ADMIN — ANORECTAL OINTMENT: 15.7; .44; 24; 20.6 OINTMENT TOPICAL at 14:18

## 2022-02-01 RX ADMIN — GABAPENTIN 600 MG: 600 TABLET, FILM COATED ORAL at 22:42

## 2022-02-01 ASSESSMENT — ENCOUNTER SYMPTOMS
COUGH: 0
ABDOMINAL DISTENTION: 1
VOMITING: 0
DIARRHEA: 0
SHORTNESS OF BREATH: 0
NAUSEA: 0
CONSTIPATION: 1
CHEST TIGHTNESS: 0
ABDOMINAL PAIN: 0

## 2022-02-01 ASSESSMENT — PAIN SCALES - GENERAL
PAINLEVEL_OUTOF10: 0
PAINLEVEL_OUTOF10: 0

## 2022-02-01 NOTE — CARE COORDINATION
Case Management Assessment           Daily Note                 Date/ Time of Note: 2/1/2022 12:58 PM         Note completed by: ARIC Jerez, ARIELW    Patient Name: Gaviota Patel  YOB: 1940    Diagnosis:Symptomatic sinus bradycardia [R00.1]  Hypothermia, initial encounter [T68. XXXA]  Fall from bed, initial encounter [W06. XXXA]  Patient Admission Status: Inpatient    Date of Admission:1/26/2022  8:07 PM Length of Stay: 5 GLOS: GMLOS: 2.7    Current Plan of Care: Influenza B, IV abx, 1L O2, reports made to APS    ________________________________________________________________________________________  PT AM-PAC: 6 / 24 per last evaluation on: 1/31    OT AM-PAC: 14 / 24 per last evaluation on: 1/31    DME Needs for discharge:defer  ________________________________________________________________________________________  Dis[facility]    Tentative discharge date: 2/2    Current barriers to discharge: placement needed     Referrals completed: Not Applicable    Resources/ information provided: Not indicated at this time  ________________________________________________________________________________________  Case Management Notes:  Current plan is for Pt to go to ARU. [facility] is starting pre-cert today 2/1. Family meeting w/care patrol tomorrow at 2:30pm to decide on a long term plan and a back up plan. SW following for DCP. Soren Zavala and his family were provided with choice of provider; he and his family are in agreement with the discharge plan.     Care Transition Patient: ARIC Mendoza, [facility]  Case Management Department  Ph: [phone]  Fax: [phone]

## 2022-02-01 NOTE — PROGRESS NOTES
Comprehensive Nutrition Assessment    RECOMMENDATIONS:  1. PO Diet: Continue minced and moist per SLP, low sodium diet  2. ONS: Continue Ensure Enlive with meals and wound healing supplement BID    NUTRITION ASSESSMENT:    Nutritional summary & status: Pt with improved po intakes. Per EMR pt consuming >76% of all meals for the last 2 days. No wt loss noted since admission. Will continue to monitor po intakes and nutrition status.     Admission/PMH: Admit w/ Symptomatic Bradycardia, Cystitis, Rhabdomyolysis, RLE Chronic Venous Stasis Dermatitis with Partial Thickness Ulceration; PMH CAD, status post CABG, heart failure preserved LV ejection fraction, LV ejection fraction 55 to 60%, hypertension, hyperlipidemia, BPH, mildly positive stress test by 2020, status post coronary angiogram     MALNUTRITION ASSESSMENT  Context of Malnutrition: Acute Illness   Malnutrition Status: No malnutrition  Findings of the 6 clinical characteristics of malnutrition (Minimum of 2 out of 6 clinical characteristics is required to make the diagnosis of moderate or severe Protein Calorie Malnutrition based on AND/ASPEN Guidelines):  Energy Intake: Mild decrease in energy intake (comment)   Energy Intake Time: No significant  -po improved  Weight Loss %: No significant loss   Weight loss Time: No significant      NUTRITION DIAGNOSIS   Inadequate oral intake related to  (decreased appetite) as evidenced by intake 26-50%,poor intake prior to admission    [address] and/or Nutrient Delivery:  Continue Current Diet,Continue Oral Nutrition Supplement  Nutrition Education/Counseling:  No recommendation at this time   Goals:  pt will consistently consume >50% PO intake of meals and ONS through adm       Nutrition Monitoring and Evaluation:   Food/Nutrient Intake Outcomes:  Food and Nutrient Intake,Supplement Intake  Physical Signs/Symptoms Outcomes:  Biochemical Data,Weight     OBJECTIVE DATA: Significant to nutrition assessment  · Nutrition-Focused Physical Findings: +2 BLE edema, lbm 1/31  · Labs: Reviewed  · Meds: Reviewed; vitamin B12, dulcolax, colace, synthroid  · Wounds: Venous Stasis,Wound Consult Pending (bilateral lower extremities and buttocks)       CURRENT NUTRITION THERAPIES  ADULT ORAL NUTRITION SUPPLEMENT; Breakfast, Lunch; Standard High Calorie/High Protein Oral Supplement  ADULT ORAL NUTRITION SUPPLEMENT; Dinner, Breakfast; Wound Healing Oral Supplement  ADULT DIET; Dysphagia - Minced and Moist; Low Sodium (2 gm)     PO Intake: 26-50%   PO Supplement Intake:None Ordered  Additional Sources of Calories/IVF:n/a     ANTHROPOMETRICS  Current Height: 5' 11\" (180.3 cm)  Current Weight: 215 lb 2.7 oz (97.6 kg)    Admission weight: 207 lb (93.9 kg)  Ideal Body Weight (IBW): 172 lbs  (78 kg)    Usual Bodyweight  (185-205lb per EMR)   Weight Changes: wt fluctuates d/t fluid shifts   BMI: 30.1    Wt Readings from Last 50 Encounters:   01/27/22 205 lb 4 oz (93.1 kg)   01/20/22 207 lb 3.7 oz (94 kg)   12/28/21 185 lb (83.9 kg)   11/10/21 184 lb 4.8 oz (83.6 kg)   11/03/21 199 lb 4.7 oz (90.4 kg)   09/27/21 187 lb (84.8 kg) stated   09/26/21 187 lb (84.8 kg) stated   08/30/21 187 lb 9.6 oz (85.1 kg) estimated   06/22/21 207 lb (93.9 kg)   05/31/21 213 lb (96.6 kg)     COMPARATIVE STANDARDS  Energy (kcal):  7643-5520; Weight Used for Energy Requirements:  Admission (93kg)     Protein (g):   (1.2-1.4g/kg); Weight Used for Protein Requirements:  Ideal        Fluid (ml/day):  5339-7416; Method Used for Fluid Requirements:  1 ml/kcal      The patient will still be monitored per nutrition standards of care. Consult dietitian if nutrition interventions essential to patient care is needed.      Modesto Rojas, [address], [facility] [place]: [phone]  Office:  [phone]

## 2022-02-01 NOTE — PROGRESS NOTES
Physician Progress Note      PATIENTIsabelle Hash  CSN #:                  [id number]  :                       1940  ADMIT DATE:       2022 8:07 PM  [address] [place] DATE:  RESPONDING  PROVIDER #:        Josh Herrera          QUERY TEXT:    Patient admitted with symptomatic bradycardia likely 2/2 hypothermia. Noted   documentation of aspiration pneumonia on  by  [facility] Pulmonary resident PN and   \"pneumonia less likely pt without fever or leukocytosis procal 0.12, WNL\" on    [facility] pulmonary resident PN. Per attending throughout chart: \"? aspiration   pneumonia\". Please clarify likely status of aspiration pneumonia such as: The medical record reflects the following:  Risk Factors: [de-identified] y.o. male fjound down, symptomatic bradycardia likely 2/2   hypothermia, rhabdomyolysis, anemia, hypotension  Clinical Indicators:  patient less responsive and desat. CXR with stable   left basilar pleural-parencymal opacities and bilateral interstitial   opacities. Improved after deep suctioning  Treatment: patient was started on unasyn for concerns of aspiration PNA -   changed to augmentin today  Options provided:  -- Aspiration pneumonia confirmed  -- Aspiration pneumonia ruled out  -- Other - I will add my own diagnosis  -- Disagree - Not applicable / Not valid  -- Disagree - Clinically unable to determine / Unknown  -- Refer to Clinical Documentation Reviewer    PROVIDER RESPONSE TEXT:    After study, aspiration pneumonia ruled out.     Query created by: Li Lawton on 2022 4:37 PM      Electronically signed by:  Josh Herrera 2022 2:56 PM

## 2022-02-01 NOTE — PROGRESS NOTES
Progress Note    Admit Date: 1/26/2022  Diet: ADULT ORAL NUTRITION SUPPLEMENT; Breakfast, Lunch; Standard High Calorie/High Protein Oral Supplement  ADULT ORAL NUTRITION SUPPLEMENT; Dinner, Breakfast; Wound Healing Oral Supplement  ADULT DIET; Dysphagia - Minced and Moist; Low Sodium (2 gm)    CC: fall    Interval history: No acute events overnight. Patient remains afebrile with stable vitals. Patient remains on room air with SpO2 above 95%. Patient was seen at bedside this morning, patient is fairly responsive and answering all questions asked. Patient is tolerating diet well with no issues. Medications:     Scheduled Meds:   levothyroxine  75 mcg Oral Daily    amoxicillin-clavulanate  1 tablet Oral 2 times per day    aspirin  81 mg Oral Daily    pantoprazole  40 mg Oral QAM AC    vitamin B-12  1,000 mcg Oral Daily    gabapentin  600 mg Oral BID    atorvastatin  80 mg Oral Nightly    sodium chloride flush  5-40 mL IntraVENous 2 times per day    enoxaparin  40 mg SubCUTAneous Daily    docusate sodium  200 mg Oral BID    bisacodyl  10 mg Rectal Daily     Continuous Infusions:   sodium chloride 25 mL (01/31/22 0313)     PRN Meds:ipratropium-albuterol, menthol-zinc oxide, melatonin, dicyclomine, albuterol, guaiFENesin, sodium chloride flush, sodium chloride, ondansetron **OR** ondansetron, polyethylene glycol, acetaminophen **OR** acetaminophen    Objective:   Vitals:   T-max:  Patient Vitals for the past 8 hrs:   BP Temp Temp src Pulse Resp SpO2   02/01/22 0738 124/67 98.5 °F (36.9 °C) Axillary 63 16 92 %   02/01/22 0332 122/65 98.5 °F (36.9 °C) Axillary 69 16 91 %       Intake/Output Summary (Last 24 hours) at 2/1/2022 0926  Last data filed at 2/1/2022 0333  Gross per 24 hour   Intake 440 ml   Output 450 ml   Net -10 ml       Review of Systems   Constitutional: Positive for chills and fatigue. Negative for fever. Respiratory: Negative for cough, chest tightness and shortness of breath. Cardiovascular: Positive for palpitations. Negative for chest pain and leg swelling. Gastrointestinal: Positive for abdominal distention and constipation. Negative for abdominal pain, diarrhea, nausea and vomiting. Genitourinary: Negative for difficulty urinating. Neurological: Positive for dizziness, weakness and light-headedness. Physical Exam  Constitutional:       General: He is not in acute distress. Appearance: Normal appearance. HENT:      Head: Normocephalic and atraumatic. Nose: Nose normal.   Eyes:      Conjunctiva/sclera: Conjunctivae normal.      Pupils: Pupils are equal, round, and reactive to light. Cardiovascular:      Rate and Rhythm: Regular rhythm. Pulses: Normal pulses. Heart sounds: No murmur heard. No friction rub. No gallop. Pulmonary:      Effort: Pulmonary effort is normal. No respiratory distress. Breath sounds: Normal breath sounds. No wheezing. Abdominal:      General: Abdomen is flat. Bowel sounds are normal. There is distension. Hernia: A hernia is present. Musculoskeletal:      Cervical back: Normal range of motion. Skin:     General: Skin is warm and dry. Neurological:      Mental Status: He is alert and oriented to person, place, and time. Cranial Nerves: No cranial nerve deficit. Sensory: Sensation is intact. LABS:    CBC:   Recent Labs     01/30/22  0612 01/31/22  0431 02/01/22  0639   WBC 13.5* 12.2* 12.3*   HGB 8.9* 8.2* 7.9*   HCT 28.2* 25.8* 25.1*   * 110* 110*   MCV 90.1 88.4 88.6     Renal:    Recent Labs     01/30/22  0612 01/30/22  1512 01/31/22  0432 01/31/22  0924 02/01/22  0640   *   < > 144 144 143   K 4.6  --  4.1  --  4.7   *  --  111*  --  110   CO2 26  --  25  --  23   BUN 36*  --  33*  --  32*   CREATININE 1.5*  --  1.3  --  1.2   GLUCOSE 101*  --  111*  --  93   CALCIUM 8.8  --  8.2*  --  8.2*   ANIONGAP 9  --  8  --  10    < > = values in this interval not displayed. right femur ORIF without evidence of hardware complication. Right tib-fib:   1. No acute osseous findings. CT Head WO Contrast   Final Result      Head   1. No evidence acute intracranial hemorrhage or mass effect. 2.  Mild to moderate atrophy. Cervical spine   1. No evidence of acute fracture or malalignment. 2.  Moderate multilevel cervical spondylosis. CT Cervical Spine WO Contrast   Final Result      Head   1. No evidence acute intracranial hemorrhage or mass effect. 2.  Mild to moderate atrophy. Cervical spine   1. No evidence of acute fracture or malalignment. 2.  Moderate multilevel cervical spondylosis. XR HIP 2-3 VW W PELVIS RIGHT    (Results Pending)       Assessment/Plan:     1. Symptomatic Bradycardia likely 2/2 Hypothermia, resolved   Patient was found down for unknown amount of time. Patient has previous hx of bradycardia with HR down to 30s in previous hospitalizations. Was supposed to have a CAM monitor placed which wasn't placed. Patient is not on any remington agents. Bradycardia is improving with improvements in body temperature. - On telemetry   - Omaira NICOLE followin week CAM on discharge    2. Hypothermia, resolved  Likely 2/2 Hypothyroidism or Anemia vs Influenza   Patient was hypothermic on arrival with T 90.3. He reports fatigue, constipation, and difficulty concentrating. TSH 5.19 in 2022 with normal T4. Denies weight gain, reporting weight loss. CBC showed signs of anemia with Hgb of 8.6 and Hct 26.9. Had fall with unknown fall time. Currently T 97.9.  - TSH 5.4 with reflex to T4 1, started on synthyroid 75 mcg  - Normal cortisol levels, recent 12   - Influenza B positive, s/p Tamiflu 30 mg BID    3. Altered mental status, resolved   Patient was less responsive last afternoon and had a drop in O2 saturation. A stat EKG and chest Xray was ordered which were stable.  Patient was also having a weak cough with concerns of aspiration, respiratory therapist and Dr Myriam Mera was at bedside with improvement in patients status thereafter following deep suctioning  -continue on dysphagia diet as per recommendations   -s/p D5 in the setting of hypernatremia, which is corrected   -s/p unasyn for concerns of aspiration, started on PO augmentin every 12 hrs     4. Rule out Sepsis   Patient was hypothermic on arrival with bradycardia and intermittently confused. - Procalcitonin 0.12  - Blood cultures show NGTD   - On Augmentin every 12 hrs     5. Cystitis  UA on 1/17 was cloudy with 10-20 WBC and moderate leukocyte esterase. UA today showed 3+ bacteria and trace leukocyte esterase. CT Abd/Pelvis showed thickening of the bladder consistent with possible cystitis. Patient has chronic schneider catheter which was replaced 10 days ago. - Replaced schneider catheter  - urine cultures positive for Trichosporon and E fecalis, s/p Unasyn   - F/u sensitivities    6. Troponinemia  - Troponin 0.05 on arrival, recent 0.11, s/p EKG which showed no new changes from prior    7. Constipation  Patient reports that he has been constipated for 2 weeks. - Home meds  - started on dulcolax suppository   - Continue to monitor    8. 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 Koki Parnell care following     9. BL LE Neuropathy  - Continue Gabapentin    10. Concern for malignancy  - Patient has a smoking history. Denies any alcohol use. Reports anorexia and recent weight loss. He denies hemoptysis currently.   - CT Abd/Pelvis/Chest showed left lower lobe consolidation suspicious of pneumonia vs malignancy  -consulted pulmonology: mass likely 2/2 rounded atelectasis, no further pulm w/o required      Code Status: DNR-CCA   FEN: ADULT ORAL NUTRITION SUPPLEMENT; Breakfast, Lunch; Standard High Calorie/High Protein Oral Supplement  ADULT ORAL NUTRITION SUPPLEMENT; Dinner, Breakfast; Wound Healing Oral Supplement  ADULT DIET;  Dysphagia - Minced and Moist; Low Sodium (2 gm)  PPX: Lovenox  DISPO: Juan Manuel Zavala MD, PGY-1  02/01/22  9:26 AM    This patient has been staffed and discussed with Tanya Heaton MD.

## 2022-02-02 LAB
ANION GAP SERPL CALCULATED.3IONS-SCNC: 7 MMOL/L (ref 3–16)
BASOPHILS ABSOLUTE: 0 K/UL (ref 0–0.2)
BASOPHILS RELATIVE PERCENT: 0.4 %
BUN BLDV-MCNC: 35 MG/DL (ref 7–20)
CALCIUM SERPL-MCNC: 8.2 MG/DL (ref 8.3–10.6)
CHLORIDE BLD-SCNC: 106 MMOL/L (ref 99–110)
CO2: 24 MMOL/L (ref 21–32)
CREAT SERPL-MCNC: 1.3 MG/DL (ref 0.8–1.3)
EOSINOPHILS ABSOLUTE: 0.6 K/UL (ref 0–0.6)
EOSINOPHILS RELATIVE PERCENT: 6.7 %
GFR AFRICAN AMERICAN: >60
GFR NON-AFRICAN AMERICAN: 53
GLUCOSE BLD-MCNC: 111 MG/DL (ref 70–99)
HCT VFR BLD CALC: 24.1 % (ref 40.5–52.5)
HEMOGLOBIN: 7.8 G/DL (ref 13.5–17.5)
LYMPHOCYTES ABSOLUTE: 1 K/UL (ref 1–5.1)
LYMPHOCYTES RELATIVE PERCENT: 11.2 %
MCH RBC QN AUTO: 28.6 PG (ref 26–34)
MCHC RBC AUTO-ENTMCNC: 32.3 G/DL (ref 31–36)
MCV RBC AUTO: 88.6 FL (ref 80–100)
MONOCYTES ABSOLUTE: 0.5 K/UL (ref 0–1.3)
MONOCYTES RELATIVE PERCENT: 5.5 %
NEUTROPHILS ABSOLUTE: 6.6 K/UL (ref 1.7–7.7)
NEUTROPHILS RELATIVE PERCENT: 76.2 %
PDW BLD-RTO: 18.3 % (ref 12.4–15.4)
PLATELET # BLD: 118 K/UL (ref 135–450)
PMV BLD AUTO: 9.8 FL (ref 5–10.5)
POTASSIUM REFLEX MAGNESIUM: 4.2 MMOL/L (ref 3.5–5.1)
RBC # BLD: 2.72 M/UL (ref 4.2–5.9)
SODIUM BLD-SCNC: 137 MMOL/L (ref 136–145)
WBC # BLD: 8.6 K/UL (ref 4–11)

## 2022-02-02 PROCEDURE — 1200000000 HC SEMI PRIVATE

## 2022-02-02 PROCEDURE — 6370000000 HC RX 637 (ALT 250 FOR IP)

## 2022-02-02 PROCEDURE — 97530 THERAPEUTIC ACTIVITIES: CPT

## 2022-02-02 PROCEDURE — 36415 COLL VENOUS BLD VENIPUNCTURE: CPT

## 2022-02-02 PROCEDURE — 92526 ORAL FUNCTION THERAPY: CPT

## 2022-02-02 PROCEDURE — 85025 COMPLETE CBC W/AUTO DIFF WBC: CPT

## 2022-02-02 PROCEDURE — 97110 THERAPEUTIC EXERCISES: CPT

## 2022-02-02 PROCEDURE — 6360000002 HC RX W HCPCS

## 2022-02-02 PROCEDURE — 80048 BASIC METABOLIC PNL TOTAL CA: CPT

## 2022-02-02 PROCEDURE — 2580000003 HC RX 258

## 2022-02-02 RX ORDER — AMOXICILLIN AND CLAVULANATE POTASSIUM 875; 125 MG/1; MG/1
1 TABLET, FILM COATED ORAL EVERY 12 HOURS SCHEDULED
Qty: 5 TABLET | Refills: 0 | Status: SHIPPED | OUTPATIENT
Start: 2022-02-02 | End: 2022-02-04 | Stop reason: HOSPADM

## 2022-02-02 RX ORDER — LEVOTHYROXINE SODIUM 0.07 MG/1
75 TABLET ORAL DAILY
Qty: 30 TABLET | Refills: 3 | Status: SHIPPED | OUTPATIENT
Start: 2022-02-03

## 2022-02-02 RX ADMIN — SODIUM CHLORIDE, PRESERVATIVE FREE 10 ML: 5 INJECTION INTRAVENOUS at 22:48

## 2022-02-02 RX ADMIN — LEVOTHYROXINE SODIUM 75 MCG: 0.07 TABLET ORAL at 06:44

## 2022-02-02 RX ADMIN — PANTOPRAZOLE SODIUM 40 MG: 40 TABLET, DELAYED RELEASE ORAL at 06:44

## 2022-02-02 RX ADMIN — ACETAMINOPHEN 650 MG: 325 TABLET ORAL at 22:47

## 2022-02-02 RX ADMIN — ENOXAPARIN SODIUM 40 MG: 100 INJECTION SUBCUTANEOUS at 09:44

## 2022-02-02 RX ADMIN — ASPIRIN 81 MG: 81 TABLET, CHEWABLE ORAL at 09:45

## 2022-02-02 RX ADMIN — AMOXICILLIN AND CLAVULANATE POTASSIUM 1 TABLET: 875; 125 TABLET, FILM COATED ORAL at 22:48

## 2022-02-02 RX ADMIN — SODIUM CHLORIDE, PRESERVATIVE FREE 10 ML: 5 INJECTION INTRAVENOUS at 09:45

## 2022-02-02 RX ADMIN — CYANOCOBALAMIN TAB 1000 MCG 1000 MCG: 1000 TAB at 09:45

## 2022-02-02 RX ADMIN — ATORVASTATIN CALCIUM 80 MG: 40 TABLET, FILM COATED ORAL at 22:47

## 2022-02-02 RX ADMIN — GABAPENTIN 600 MG: 600 TABLET, FILM COATED ORAL at 22:47

## 2022-02-02 RX ADMIN — AMOXICILLIN AND CLAVULANATE POTASSIUM 1 TABLET: 875; 125 TABLET, FILM COATED ORAL at 09:45

## 2022-02-02 RX ADMIN — GABAPENTIN 600 MG: 600 TABLET, FILM COATED ORAL at 09:44

## 2022-02-02 ASSESSMENT — ENCOUNTER SYMPTOMS
CHEST TIGHTNESS: 0
CONSTIPATION: 1
SHORTNESS OF BREATH: 0
ABDOMINAL PAIN: 0
NAUSEA: 0
DIARRHEA: 0
ABDOMINAL DISTENTION: 1
COUGH: 0
VOMITING: 0

## 2022-02-02 ASSESSMENT — PAIN - FUNCTIONAL ASSESSMENT: PAIN_FUNCTIONAL_ASSESSMENT: PREVENTS OR INTERFERES SOME ACTIVE ACTIVITIES AND ADLS

## 2022-02-02 ASSESSMENT — PAIN DESCRIPTION - FREQUENCY: FREQUENCY: CONTINUOUS

## 2022-02-02 ASSESSMENT — PAIN DESCRIPTION - LOCATION
LOCATION: LEG;FOOT
LOCATION: FOOT;LEG

## 2022-02-02 ASSESSMENT — PAIN SCALES - GENERAL
PAINLEVEL_OUTOF10: 5
PAINLEVEL_OUTOF10: 0
PAINLEVEL_OUTOF10: 0

## 2022-02-02 ASSESSMENT — PAIN DESCRIPTION - PAIN TYPE: TYPE: CHRONIC PAIN

## 2022-02-02 ASSESSMENT — PAIN DESCRIPTION - PROGRESSION: CLINICAL_PROGRESSION: NOT CHANGED

## 2022-02-02 ASSESSMENT — PAIN DESCRIPTION - ORIENTATION
ORIENTATION: RIGHT;LEFT
ORIENTATION: RIGHT;LEFT

## 2022-02-02 ASSESSMENT — PAIN DESCRIPTION - ONSET: ONSET: ON-GOING

## 2022-02-02 NOTE — PROGRESS NOTES
Pt a/o x4, VSS. Lungs remain clear and diminished. Pt tolerating diet well, upgraded to easy to chew diet. Pt is calm and cooperative. Up to chair most of day. BLE remain wrapped, CDI. Pt denies SOB/chest pain/discomfort. No new skin issues. Chronic schneider remains in place with adequate output. All needs met at this time.

## 2022-02-02 NOTE — CARE COORDINATION
Case Management Assessment           Daily Note                 Date/ Time of Note: 2/2/2022 4:04 PM         Note completed by: ARIC Manning, ARIELW    Patient Name: Renato Foss  YOB: 1940    Diagnosis:Symptomatic sinus bradycardia [R00.1]  Hypothermia, initial encounter [T68. XXXA]  Fall from bed, initial encounter [W06. XXXA]  Patient Admission Status: Inpatient    Date of Admission:1/26/2022  8:07 PM Length of Stay: 6 GLOS: GMLOS: 2.7    Current Plan of Care: placement pending  ________________________________________________________________________________________  PT AM-PAC: 8 / 24 per last evaluation on: 1/31    OT AM-PAC: 13 / 24 per last evaluation on: 1/31    DME Needs for discharge: defer  ________________________________________________________________________________________  Discharge Plan: To Be Determined DUE TO: Care patrol    Tentative discharge date: 2/3    Current barriers to discharge: placement needed    Referrals completed: Not Applicable    Resources/ information provided: Not indicated at this time  ________________________________________________________________________________________  Case Management Notes:  Update: pre-cert went peer to peer. SW notified MD, it must be scheduled and completed by 2pm tomorrow phone number [phone] ext. [phone]      SW met w/Pt. Pt's son, and Swathi Dial, regarding DCP. Pt is agreeable to AL if ARU denies. Pt signed financial POA today. Swathi Dial made calls to ALs, placement pending. SW following for DCP. Mary Macias and his family were provided with choice of provider; he and his family are in agreement with the discharge plan.     Care Transition Patient: ARIC Kern, [facility]  Case Management Department  Ph: [phone]  Fax: [phone]

## 2022-02-02 NOTE — PROGRESS NOTES
Physical Therapy  Daily Treatment Note    Discharge Recommendations: Chelo Boone scored a 8/24 on the AM-PAC short mobility form. Current research shows that an AM-PAC score of 17 or less is typically not associated with a discharge to the patient's home setting. Based on the patient's AM-PAC score and their current functional mobility deficits, it is recommended that the patient have 3-5 sessions per week of Physical Therapy at d/c to increase the patient's independence. Please see assessment section for further patient specific details. Assessment:  Pt needing less assist for bed mobility this session. Also slightly less assist needed for sit to stand to [employer]. However, pt continues to need assist x 2 for OOB. Fluctuating assist of levels while sitting EOB. Pt limited by weakness and fatigue. Would benefit from continued IP PT at D/C prior to returning home, as he lives alone. Equipment Needs: Defer to next level of care    Chart Reviewed: Yes     Other position/activity restrictions: up with assist   Additional Pertinent Hx: [de-identified] yo wheel chair bound male comes to the hospital after a fall. Pt with symptomatic bradycardia possibly caused by hypothermia. Recent hospitalization where SNF recommended but pt owes money to SNF and was unable to be placed. Diagnosis: symptomatic sinus bradycardia   Treatment Diagnosis: impaired mobility 2/2 fall    Subjective: Pt in bed initially. Agreeable to working with therapy. Not sure when he's being D/Daren. \"You guys always come at the worst times. \"    Pain: c/o some discomfort B feet. \"I have neuropathy. \"    Objective:    Bed mobility  Supine to sit: Max assist x 1, HOB up partially with use of rail  Scooting: Mod assist to EOB    Transfers  Sit to stand: Dependent (Mod assist x 1 + Max assist x 1) from raised height bed to [employer]; Dependent (Mod assist x 2) from [phone]. Pt having difficulty achieving full stance.    Stand to sit: Dependent (Mod assist x 2) into chair. Decreased eccentric control noted. Bed < chair: Dependent via [employer] [employer]    Balance  Sat EOB x 13 minutes. Pt needing CGA to Min assist statically. Mod to Dependent for dynamic activity (for LOB/lean posteriorly.) Pt performing UE activities/reaching with OT while sitting EOB. Sat in [employer] for transfer to chair with CGA for balance. Exercises  While sitting EOB: 10 reps L LAQ  While seated in chair: 15 reps hip flexion, LAQ, hip adductor squeezes (1-2 second hold with pillow)    Patient Education  Calling for assist with needs. Importance of mobility. Use of call button. Expressed understanding. Safety Devices  Pt left with needs in reach. In chair (reclined) with chair alarm on. RN updated. Goals: (as determined and assessed by primary PT)  Time Frame for Short term goals: By discharge  Short term goal 1: The pt will perform bed mobility with mod A x 1  ongoing   Short term goal 2: The pt will transfer with mod A for sit to stand   ongoing   Short term goal 3: The pt will be able to perform a stand pivot with LRAD and mod A x 2   ongoing      Plan:  Times per week: 2-5; Times per day: Daily  Current Treatment Recommendations: Strengthening,Functional Mobility Training,Transfer Training,Balance Training,Patient/Caregiver Education & Training    Therapy Time    Individual  Concurrent  Group  Co-treatment    Time In  833            Time Out  911            Minutes  38              Timed Code Treatment Minutes: 38  Total Treatment Minutes: 38    Will continue per plan of care. If patient is discharged prior to next treatment, this note will serve as the discharge summary.     [place], [place] #[id number]

## 2022-02-02 NOTE — PROGRESS NOTES
Speech Language Pathology  Facility/Department: [facility] 4 PCU  Dysphagia Daily Treatment Note    NAME: Lacy Ward  : 1940  MRN: [id number]    Patient Diagnosis(es):   Patient Active Problem List    Diagnosis Date Noted    Symptomatic sinus bradycardia 2022    Bilateral leg edema 2022    Symptomatic bradycardia     Weakness 2022    Pain of right lower extremity     Degloving injury     Leg laceration, unspecified laterality, initial encounter 10/31/2021    Altered mental status     Hyperkalemia     Encephalopathy acute 2021    Acute renal failure superimposed on stage 3 chronic kidney disease ([address] 02/15/2021    Urinary tract infection associated with indwelling urethral catheter ([address] 02/15/2021    Acute cystitis with hematuria 2021    Abnormal angiogram 2020    H/O angiography 2020    Abnormal stress test 2020    Constipation 2020    Encopresis with constipation and overflow incontinence 2020    Cellulitis of scrotum 2020    Acute renal injury ([address] 2020    Pseudomonas infection 2020    Dyspnea     Bradycardia     CHF with unknown LVEF ([address] 2020    Gross hematuria 2019    Chronic indwelling Iglesias catheter 2019    Hyperlipidemia 2019    Bilateral lower leg cellulitis 2019    Neurogenic bladder 2019    Bacteriuria 2019    Abnormality of urethral meatus 2019    Acute encephalopathy 10/08/2019    Problem with urinary catheter ([address] 10/07/2019    Edema     Complicated UTI (urinary tract infection) 2019    Hypothermia 2019    Acute low back pain without sciatica 2017    Hip fracture, right ([address] 2015    Acute right hip pain     Lower GI bleeding 11/10/2014    CAD (coronary artery disease) 2014    S/P CABG x 5 2014    Cellulitis 2013    Essential hypertension 2013    GERD (gastroesophageal reflux disease) 12/16/2013    Acute blood loss anemia 12/16/2013    Tinea corporis 12/16/2013    Carotid stenosis 12/16/2013    Hypotension 11/30/2013    Light headed 11/30/2013     Allergies: Allergies   Allergen Reactions    Bactrim [Sulfamethoxazole-Trimethoprim] Rash       Recent Chest Xray: (01/29/2022)  Impression       1.  Stable left basilar pleural-parenchymal opacities and bilateral interstitial opacities.      Recent CT Head: (01/26/2022)      Impression       Head   1.  No evidence acute intracranial hemorrhage or mass effect. 2.  Mild to moderate atrophy.       Cervical spine   1.  No evidence of acute fracture or malalignment. 2.  Moderate multilevel cervical spondylosis.      Prior MBS (02/18/2021)  Oral Phase: Mild oral deficits characterized by weak lingual manipulation resulting in premature spillage to level of valleculae w/ all consistencies; mildly delayed swallow initiation and intermittent cues required to swallow. Piecemeal swallow noted x2 w/ thin liquids; statis of valleculae and cleared w/ secondary swallow. Pharyngeal: Mild pharyngeal phase characterized by vallecular residue that required secondary swallow to clear. No penetration or aspiration w/ any consistencies or trials. No pharyngeal residue. Upper Esophageal Screen: Adequate clearance of UES and proximal esophagus. Pt does endorsed acid reflux; further f/u w/ PCP or GI.       Chart reviewed. Medical Diagnosis: Symptomatic sinus bradycardia [R00.1]  Hypothermia, initial encounter [T68. XXXA]  Fall from bed, initial encounter [W06. XXXA]   Treatment Diagnosis: dysphagia    BSE Impression 1/29/22  Mild oral dysphagia 2/2 weakness and reduced dentition. Assessed tolerance ice chips, thins via cup/straw, puree, and soft solids. D/t manual dexterity issues, pt with improved liquid intake via cup w/ straw. Positive oral acceptance, mildly prolonged mastication, good oral clearance, no overt s/s aspiraiton. To facilitate oral phase, recommend dysphagia soft & bites-sized and thin liquids via straw, general aspiration precautions. Dysphagia Diagnosis: Mild oral stage dysphagia  MBS results - not indicated at this time    Pain: not indicated through any means    Current Diet : ADULT ORAL NUTRITION SUPPLEMENT; Breakfast, Lunch; Standard High Calorie/High Protein Oral Supplement  ADULT ORAL NUTRITION SUPPLEMENT; Dinner, Breakfast; Wound Healing Oral Supplement  ADULT DIET; Dysphagia - Minced and Moist; Low Sodium (2 gm)   Recommended Form of Meds: PO     Treatment:  Pt seen bedside to address the following goals:   Goal 1: Patient will tolerate least restrictive diet without overt signs of aspiration or associated decline in respiratory status. 1/30: pt sitting up in bed, feeding self breakfast, though appears pt would benefit from assistance, as many spills noted on tray. Pt confused, talking about working and needing . Pt required cues to discontinue talking while eating. Pt consumed puree and thin liquids via cup and straw with no overt signs of aspiration, voice remained clear. Pt demonstrated very prolonged mastication with oral residue occurring, when attempting to masticate bite sized sausage. No respiratory decline per chart review, pt on 1L O2. Recommend downgrading to dysphagia II minced and moist/thin liquids   Cont goal  1/31: pt with breakfast tray, consisting of cream of wheat/grits, multiple liquids and trial of hard solid. Pt exhibited adequate labial seal, no anterior loss of bolus. Pt exhibited prolonged mastication with hard solid, mild lingual residue. No oral residue/pocketing with the puree /soft solid items, cleared oral cavity completely. Occasional mild throat clear across PO trials, no coughing or change in voice occurred. No respiratory decline per chart review.  D/w RN and medical resident  Goal met, cont to ensure consistency  2/2: pt sitting up in chair with PO on tray table.  Explained purpose of visit, assessing to determine ability to upgrade. Pt agreeable, stating he would like that. Pt analyzed with thin liquids via cup/straw, no cough/throat clear or change in voice occurred. Pt presented with full deven cracker for assessment of solids, pt with limited dentition. Pt took appropriate sized bites independently, cleared oral cavity then requested liquid to clear independently. Pt demonstrated improved awareness this date. Pt states he normally eats softer foods due to dentition, agreeable to easy to chew texture, when this was explained. Goal met, cont with upgraded texture. Goal 2: Patient/caregiver will demonstrate understanding of swallowing concerns/recommendations  1/30: pt educated to purpose of visit, does not indicate comprehension  Cont goal  1/31:  Pt educated to rationale for current diet, strategies for improved safety of swallow and instruction to notify staff if any signs of aspiration emerge. Pt stated partial comprehension  Cont goal   2/2:  Pt educated to purpose of visit, and desire to upgrade texture if able. Pt demonstrated improved awareness and decreased confusion at this time. Pt demonstrated independent use of strategies to improve safety of swallow. Cont goal    Patient/Family/Caregiver Education:  As above    Compensatory Strategies: Alternate solids and liquids  Eat/Feed slowly  Upright as possible for all oral intake  Small bites/sips      Plan:  Continue dysphagia treatment with goals per plan of care. Diet recommendations: upgrade to Easy to Chew/thin liquids  DC recommendation: TBD  Treatment: 15  D/W cathi Wolf  Needs met prior to leaving room, call button in reach. Niall Crook M.S./[facility] #[id number]  Pg.  # [phone]  If patient is discharged prior to next treatment, this note will serve as the discharge summary

## 2022-02-02 NOTE — PROGRESS NOTES
Progress Note    Admit Date: 1/26/2022  Diet: ADULT ORAL NUTRITION SUPPLEMENT; Breakfast, Lunch; Standard High Calorie/High Protein Oral Supplement  ADULT ORAL NUTRITION SUPPLEMENT; Dinner, Breakfast; Wound Healing Oral Supplement  ADULT DIET; Dysphagia - Minced and Moist; Low Sodium (2 gm)    CC: fall    Interval history: No acute events overnight. Patient remains afebrile with stable vitals. Patient remains on room air with SpO2 above 95%. Patient was seen at bedside this morning, patient is fairly responsive and answering all questions asked. Patient is tolerating diet well with no issues. Medications:     Scheduled Meds:   levothyroxine  75 mcg Oral Daily    amoxicillin-clavulanate  1 tablet Oral 2 times per day    aspirin  81 mg Oral Daily    pantoprazole  40 mg Oral QAM AC    vitamin B-12  1,000 mcg Oral Daily    gabapentin  600 mg Oral BID    atorvastatin  80 mg Oral Nightly    sodium chloride flush  5-40 mL IntraVENous 2 times per day    enoxaparin  40 mg SubCUTAneous Daily    docusate sodium  200 mg Oral BID    bisacodyl  10 mg Rectal Daily     Continuous Infusions:   sodium chloride 25 mL (01/31/22 0313)     PRN Meds:ipratropium-albuterol, menthol-zinc oxide, melatonin, dicyclomine, albuterol, guaiFENesin, sodium chloride flush, sodium chloride, ondansetron **OR** ondansetron, polyethylene glycol, acetaminophen **OR** acetaminophen    Objective:   Vitals:   T-max:  Patient Vitals for the past 8 hrs:   BP Temp Temp src Pulse Resp SpO2   02/02/22 0412 117/64 98 °F (36.7 °C) Oral 56 16 90 %   02/02/22 0040 111/64 98.5 °F (36.9 °C) Oral 63 16 92 %       Intake/Output Summary (Last 24 hours) at 2/2/2022 0833  Last data filed at 2/2/2022 0446  Gross per 24 hour   Intake 1100 ml   Output 900 ml   Net 200 ml       Review of Systems   Constitutional: Positive for chills and fatigue. Negative for fever. Respiratory: Negative for cough, chest tightness and shortness of breath.     Cardiovascular: results for input(s): AST, ALT, BILITOT, BILIDIR, PROT, LABALBU, ALKPHOS in the last 72 hours. Troponin:   No results for input(s): TROPONINI in the last 72 hours. BNP: No results for input(s): BNP in the last 72 hours. Lipids: No results for input(s): CHOL, HDL in the last 72 hours. Invalid input(s): LDLCALCU, TRIGLYCERIDE  ABGs:    No results for input(s): PHART, JPD4EZP, PO2ART, TRG3KYQ, BEART, THGBART, B5LDWETH, DUR4EEC in the last 72 hours. INR: No results for input(s): INR in the last 72 hours. Lactate:   No results for input(s): LACTATE in the last 72 hours. Cultures:  -----------------------------------------------------------------  RAD:   XR CHEST PORTABLE   Final Result      1. Stable left basilar pleural-parenchymal opacities and bilateral interstitial opacities. CT CHEST ABDOMEN PELVIS W CONTRAST   Final Result     Findings likely consistent with stercoral proctitis. Cystitis    also suspected.       _______________________________________________       IMPRESSION:          Masslike consolidation of the left lung base which could be    secondary to a pneumonia though underlying neoplasm considered. Nearby pleural fluid collection concerning for an empyema. XR CHEST PORTABLE   Final Result      1. Decreased bilateral interstitial opacities which may represent improved interstitial edema. 2.  Mild cardiomegaly. 3.  Chronic left basilar pleural and parenchymal opacities, unchanged         XR TIBIA FIBULA RIGHT (2 VIEWS)   Final Result      Pelvis and right hip:   1. Diffuse demineralization limits evaluation. No evidence of acute fracture. 2.  Status post right femur ORIF without evidence of hardware complication. Right tib-fib:   1. No acute osseous findings. XR HIP 2-3 VW W PELVIS RIGHT   Final Result      Pelvis and right hip:   1. Diffuse demineralization limits evaluation. No evidence of acute fracture.    2.  Status post right femur ORIF without evidence of hardware complication. Right tib-fib:   1. No acute osseous findings. CT Head WO Contrast   Final Result      Head   1. No evidence acute intracranial hemorrhage or mass effect. 2.  Mild to moderate atrophy. Cervical spine   1. No evidence of acute fracture or malalignment. 2.  Moderate multilevel cervical spondylosis. CT Cervical Spine WO Contrast   Final Result      Head   1. No evidence acute intracranial hemorrhage or mass effect. 2.  Mild to moderate atrophy. Cervical spine   1. No evidence of acute fracture or malalignment. 2.  Moderate multilevel cervical spondylosis. XR HIP 2-3 VW W PELVIS RIGHT    (Results Pending)       Assessment/Plan:     1. Symptomatic Bradycardia likely 2/2 Hypothermia, resolved   Patient was found down for unknown amount of time. Patient has previous hx of bradycardia with HR down to 30s in previous hospitalizations. Was supposed to have a CAM monitor placed which wasn't placed. Patient is not on any remington agents. Bradycardia is improving with improvements in body temperature. - On telemetry   - Omaira NICOLE followin week CAM on discharge    2. Hypothermia, resolved  Likely 2/2 Hypothyroidism or Anemia vs Influenza   Patient was hypothermic on arrival with T 90.3. He reports fatigue, constipation, and difficulty concentrating. TSH 5.19 in 2022 with normal T4. Denies weight gain, reporting weight loss. CBC showed signs of anemia with Hgb of 8.6 and Hct 26.9. Had fall with unknown fall time. Currently T 97.9.  - TSH 5.4 with reflex to T4 1, started on synthyroid 75 mcg  - Normal cortisol levels, recent 12   - Influenza B positive, s/p Tamiflu 30 mg BID    3. Altered mental status, resolved   Patient was less responsive last afternoon and had a drop in O2 saturation. A stat EKG and chest Xray was ordered which were stable.  Patient was also having a weak cough with concerns of aspiration, respiratory therapist and Dr Myriam Mera was at bedside with improvement in patients status thereafter following deep suctioning  -continue on dysphagia diet as per recommendations   -s/p D5 in the setting of hypernatremia, which is corrected   -s/p unasyn for concerns of aspiration, started on PO augmentin every 12 hrs     4. Rule out Sepsis   Patient was hypothermic on arrival with bradycardia and intermittently confused. - Procalcitonin 0.12  - Blood cultures show NGTD   - On Augmentin every 12 hrs     5. Cystitis  UA on 1/17 was cloudy with 10-20 WBC and moderate leukocyte esterase. UA today showed 3+ bacteria and trace leukocyte esterase. CT Abd/Pelvis showed thickening of the bladder consistent with possible cystitis. Patient has chronic schneider catheter which was replaced 10 days ago. - Replaced schneider catheter  - urine cultures positive for Trichosporon and E fecalis, s/p Unasyn   - F/u sensitivities    6. Troponinemia  - Troponin 0.05 on arrival, recent 0.11, s/p EKG which showed no new changes from prior    7. Constipation  Patient reports that he has been constipated for 2 weeks. - Home meds  - started on dulcolax suppository   - Continue to monitor    8. 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 Koki Parnell care following     9. BL LE Neuropathy  - Continue Gabapentin    10. Concern for malignancy  - Patient has a smoking history. Denies any alcohol use. Reports anorexia and recent weight loss. He denies hemoptysis currently.   - CT Abd/Pelvis/Chest showed left lower lobe consolidation suspicious of pneumonia vs malignancy  -consulted pulmonology: mass likely 2/2 rounded atelectasis, no further pulm w/o required      Code Status: DNR-CCA   FEN: ADULT ORAL NUTRITION SUPPLEMENT; Breakfast, Lunch;

## 2022-02-02 NOTE — PROGRESS NOTES
Occupational Therapy  Facility/Department: [address]  Daily Treatment Note  NAME: Cindy Barriga  : 1940  MRN: [id number]    Date of Service: 2022    Discharge Recommendations:  Cindy Barriga scored a 13/24 on the AM-PAC ADL Inpatient form. Current research shows that an AM-PAC score of 17 or less is typically not associated with a discharge to the patient's home setting. Based on the patient's AM-PAC score and their current ADL deficits, it is recommended that the patient have 3-5 sessions per week of Occupational Therapy at d/c to increase the patient's independence. Please see assessment section for further patient specific details. If patient discharges prior to next session this note will serve as a discharge summary. Please see below for the latest assessment towards goals. OT Equipment Recommendations  Other: Defer to next level of care    Assessment   Performance deficits / Impairments: Decreased functional mobility ; Decreased ADL status; Decreased endurance;Decreased balance;Decreased strength;Decreased cognition  Assessment: Pt Max A for supine to sit this date with requiring Min A for static seated balance and Mod A - Dependent for dynamic sitting balance during UB/LB exercises. Pt transferred via Marielena Sizer to recliner with assist of 2. Recommend continued inpt therapy at d/c to maximize functional independence and safety. Continue with POC. Treatment Diagnosis: Impaired ADL, functional mobility  Prognosis: Fair  OT Education: OT Role;Plan of Care;Transfer Training;Home Exercise Program  Patient Education: Needs reinforcement  REQUIRES OT FOLLOW UP: Yes  Activity Tolerance  Activity Tolerance: Patient Tolerated treatment well  Safety Devices  Safety Devices in place: Yes  Type of devices: Left in chair;Call light within reach; Chair alarm in place;Nurse notified         Patient Diagnosis(es): The primary encounter diagnosis was Hypothermia, initial encounter.  A diagnosis of Fall from bed, initial encounter was also pertinent to this visit. has a past medical history of BPH (benign prostatic hyperplasia), Carotid stenosis, Cellulitis, CHF (congestive heart failure) ([facility] [address] Chronic back pain, Diverticular disease, GERD (gastroesophageal reflux disease), History of atrial fibrillation, Hypercholesteremia, Hypertension, Lower GI bleed, MDRO (multiple drug resistant organisms) resistance, Neuromuscular disorder ([address] Renal insufficiency, and Vitamin B12 deficiency. 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 Restrictions  Position Activity Restriction  Other position/activity restrictions: up with assist  Subjective   General  Chart Reviewed: Yes  Patient assessed for rehabilitation services?: Yes  Additional Pertinent Hx: [de-identified] yo wheel chair bound male comes to the hospital after a fall. Pt with symptomatic bradycardia possibly caused by hypothermia. Recent hospitalization where SNF recommended but pt owes money to SNF and was unable to be placed. PMH includes: carotid stenosis, CAD, CABG, heart failure, hypertension  Response to previous treatment: Patient with no complaints from previous session  Family / Caregiver Present: No  Referring Practitioner: Dr. Andrea Smoker  Diagnosis: Symptomatic sinus bradycardia  Subjective  Subjective: Pt supine in bed upon entry and agreeable to therapy session. General Comment  Comments: Pt supine to sit Max A for trunk. Pt sit EOB ~13min Min A static and Mod A - Dependent for dynamic with the following UB exercies: 10 right/10 left forward touch x 2 sets and 10 right/10 left cross midline x 2 sets, 10 bilateral bicep cusls x 2 sets, 10 scap squeezes x 2 sets.  Pt with the following LB exercises: 10 right/10 left kicks and 10 mraches LLE. Pt sit to stand to [address] A + Mod A. Pt transferred via [facility] to recliner and stand to sit Mod A x 2. Pt setup for breakfast. Call light in reach and chair alarm on. Orientation  Orientation  Overall Orientation Status: Within Functional Limits  Objective    ADL  Feeding: Setup        Balance  Sitting Balance:  (Min A static and Mod A - Dependent for dynamic)  Standing Balance: Dependent/Total Malachi Natalie Palmer, [place] A)  Standing Balance  Time: ~30 sec  Activity: [facility] transfer/stance  Bed mobility  Supine to Sit: Maximum assistance  Scooting: Dependent/Total  Transfers  Sit to stand: 2 Person assistance (Max A + Mod A)  Stand to sit: 2 Person assistance (Mod A x 2)                       Cognition  Overall Cognitive Status: Exceptions  Arousal/Alertness: Delayed responses to stimuli  Following Commands: Follows one step commands with repetition; Follows one step commands with increased time  Initiation: Requires cues for some  Sequencing: Requires cues for some                                         Plan   Plan  Times per week: 2-5  Current Treatment Recommendations: Strengthening,ROM,Balance Training,Functional Mobility Training,Endurance Training,Self-Care / ADL  G-Code     OutComes Score                                                  AM-PAC Score        -[facility] Inpatient Daily Activity Raw Score: 13 (02/02/22 1403)  AM-PAC Inpatient ADL T-Scale Score : 32.03 (02/02/22 1403)  ADL Inpatient CMS 0-100% Score: 63.03 (02/02/22 1403)  ADL Inpatient CMS G-Code Modifier : CL (02/02/22 1403)    Goals  Short term goals  Time Frame for Short term goals: Discharge  Short term goal 1: Transfer to/from 3 in 1 commode with mod assist of 2- not addressed 2/2  Short term goal 2: Stance with mod assist of for 30 sec x3 to assist with ADL and transfers- not met 2/2  Patient Goals   Patient goals : Get stronger       Therapy Time   Individual Concurrent Group Co-treatment Time In [facility] 63         Time Out 0911         Minutes 38         Timed Code Treatment Minutes: [place], [address]

## 2022-02-03 LAB
ANION GAP SERPL CALCULATED.3IONS-SCNC: 10 MMOL/L (ref 3–16)
BASOPHILS ABSOLUTE: 0 K/UL (ref 0–0.2)
BASOPHILS RELATIVE PERCENT: 0.5 %
BUN BLDV-MCNC: 33 MG/DL (ref 7–20)
CALCIUM SERPL-MCNC: 7.9 MG/DL (ref 8.3–10.6)
CHLORIDE BLD-SCNC: 107 MMOL/L (ref 99–110)
CO2: 22 MMOL/L (ref 21–32)
CREAT SERPL-MCNC: 1.2 MG/DL (ref 0.8–1.3)
EOSINOPHILS ABSOLUTE: 0.6 K/UL (ref 0–0.6)
EOSINOPHILS RELATIVE PERCENT: 8.2 %
GFR AFRICAN AMERICAN: >60
GFR NON-AFRICAN AMERICAN: 58
GLUCOSE BLD-MCNC: 81 MG/DL (ref 70–99)
HCT VFR BLD CALC: 23.4 % (ref 40.5–52.5)
HEMOGLOBIN: 7.4 G/DL (ref 13.5–17.5)
LYMPHOCYTES ABSOLUTE: 0.9 K/UL (ref 1–5.1)
LYMPHOCYTES RELATIVE PERCENT: 12.3 %
MCH RBC QN AUTO: 28.3 PG (ref 26–34)
MCHC RBC AUTO-ENTMCNC: 31.8 G/DL (ref 31–36)
MCV RBC AUTO: 89.1 FL (ref 80–100)
MONOCYTES ABSOLUTE: 0.4 K/UL (ref 0–1.3)
MONOCYTES RELATIVE PERCENT: 5.9 %
NEUTROPHILS ABSOLUTE: 5.1 K/UL (ref 1.7–7.7)
NEUTROPHILS RELATIVE PERCENT: 73.1 %
PDW BLD-RTO: 18.2 % (ref 12.4–15.4)
PLATELET # BLD: 145 K/UL (ref 135–450)
PMV BLD AUTO: 9.4 FL (ref 5–10.5)
POTASSIUM REFLEX MAGNESIUM: 4.3 MMOL/L (ref 3.5–5.1)
RBC # BLD: 2.63 M/UL (ref 4.2–5.9)
SODIUM BLD-SCNC: 139 MMOL/L (ref 136–145)
WBC # BLD: 6.9 K/UL (ref 4–11)

## 2022-02-03 PROCEDURE — 2580000003 HC RX 258

## 2022-02-03 PROCEDURE — 6370000000 HC RX 637 (ALT 250 FOR IP)

## 2022-02-03 PROCEDURE — 92526 ORAL FUNCTION THERAPY: CPT

## 2022-02-03 PROCEDURE — 85025 COMPLETE CBC W/AUTO DIFF WBC: CPT

## 2022-02-03 PROCEDURE — 6360000002 HC RX W HCPCS

## 2022-02-03 PROCEDURE — 80048 BASIC METABOLIC PNL TOTAL CA: CPT

## 2022-02-03 PROCEDURE — 1200000000 HC SEMI PRIVATE

## 2022-02-03 PROCEDURE — 6370000000 HC RX 637 (ALT 250 FOR IP): Performed by: STUDENT IN AN ORGANIZED HEALTH CARE EDUCATION/TRAINING PROGRAM

## 2022-02-03 PROCEDURE — 36415 COLL VENOUS BLD VENIPUNCTURE: CPT

## 2022-02-03 RX ADMIN — SODIUM CHLORIDE, PRESERVATIVE FREE 10 ML: 5 INJECTION INTRAVENOUS at 08:36

## 2022-02-03 RX ADMIN — SODIUM CHLORIDE, PRESERVATIVE FREE 10 ML: 5 INJECTION INTRAVENOUS at 22:16

## 2022-02-03 RX ADMIN — DOCUSATE SODIUM 200 MG: 100 CAPSULE, LIQUID FILLED ORAL at 22:16

## 2022-02-03 RX ADMIN — CYANOCOBALAMIN TAB 1000 MCG 1000 MCG: 1000 TAB at 08:24

## 2022-02-03 RX ADMIN — PANTOPRAZOLE SODIUM 40 MG: 40 TABLET, DELAYED RELEASE ORAL at 06:51

## 2022-02-03 RX ADMIN — LEVOTHYROXINE SODIUM 75 MCG: 0.07 TABLET ORAL at 06:51

## 2022-02-03 RX ADMIN — ASPIRIN 81 MG: 81 TABLET, CHEWABLE ORAL at 08:23

## 2022-02-03 RX ADMIN — AMOXICILLIN AND CLAVULANATE POTASSIUM 1 TABLET: 875; 125 TABLET, FILM COATED ORAL at 22:16

## 2022-02-03 RX ADMIN — GABAPENTIN 600 MG: 600 TABLET, FILM COATED ORAL at 22:16

## 2022-02-03 RX ADMIN — ATORVASTATIN CALCIUM 80 MG: 40 TABLET, FILM COATED ORAL at 22:16

## 2022-02-03 RX ADMIN — ACETAMINOPHEN 650 MG: 325 TABLET ORAL at 14:00

## 2022-02-03 RX ADMIN — AMOXICILLIN AND CLAVULANATE POTASSIUM 1 TABLET: 875; 125 TABLET, FILM COATED ORAL at 08:24

## 2022-02-03 RX ADMIN — ENOXAPARIN SODIUM 40 MG: 100 INJECTION SUBCUTANEOUS at 08:24

## 2022-02-03 RX ADMIN — GABAPENTIN 600 MG: 600 TABLET, FILM COATED ORAL at 08:24

## 2022-02-03 RX ADMIN — ANORECTAL OINTMENT: 15.7; .44; 24; 20.6 OINTMENT TOPICAL at 12:00

## 2022-02-03 ASSESSMENT — ENCOUNTER SYMPTOMS
CONSTIPATION: 1
DIARRHEA: 0
CHEST TIGHTNESS: 0
NAUSEA: 0
ABDOMINAL DISTENTION: 1
VOMITING: 0
SHORTNESS OF BREATH: 0
ABDOMINAL PAIN: 0
COUGH: 0

## 2022-02-03 ASSESSMENT — PAIN SCALES - GENERAL
PAINLEVEL_OUTOF10: 7
PAINLEVEL_OUTOF10: 0
PAINLEVEL_OUTOF10: 0

## 2022-02-03 NOTE — PROGRESS NOTES
Daily dressing changes include excoriation and redness to coccyx and buttocks, applied calmoseptine ointment 2x daily, zinc paste. Stage 3 wound on R lower leg/shin, applying adaptic, guaze, and kerlix from toes to knee, with ace wrap from toes to knee. L lower leg swelling and redness, ace wrapped applied from toes to knees. Abrasion/blister area on R hip, leaving open to air with q2h repositioning with wedge, pillow support and elevation. Specialty mattress in use.

## 2022-02-03 NOTE — CARE COORDINATION
Case Management Assessment           Daily Note                 Date/ Time of Note: 2/3/2022 2:47 PM         Note completed by: Lowry Severin, MSW, CARLIN    Patient Name: Oswaldo Wallace  YOB: 1940    Diagnosis:Symptomatic sinus bradycardia [R00.1]  Hypothermia, initial encounter [T68. XXXA]  Fall from bed, initial encounter [W06. XXXA]  Patient Admission Status: Inpatient    Date of Admission:1/26/2022  8:07 PM Length of Stay: 7 GLOS: GMLOS: 2.7    Current Plan of Care: AL placement  ________________________________________________________________________________________  PT AM-PAC: 8 / 24 per last evaluation on: 1/31    OT AM-PAC: 13 / 24 per last evaluation on: 1/31    DME Needs for discharge: defer  ________________________________________________________________________________________  Discharge Plan: Home assisted living     Tentative discharge date: 2/4    Current barriers to discharge: placement     Referrals completed: Other: assisted living    Resources/ information provided: Not indicated at this time  ________________________________________________________________________________________  Case Management Notes:  ELTON spoke w/[facility] patrol regarding AL. AL will need to complete an assessment before accepting Pt. ELTON sent medical documentation for review to [facility] and Bernice [address] informed Pt's family and [employer] that Pt has DC order as of yesterday and will need a DC tomorrow at the latest Saturday regardless of AL acceptance. ELTON spoke w/[facility], they are reviewing now and will work quickly for a decision. ELTON spoek to [employer], they are reviewing as well. Leslie Ashley and his family were provided with choice of provider; he and his family are in agreement with the discharge plan.     Care Transition Patient: No Lowry Severin, MSW, [facility]  Case Management Department  Ph: [phone]  Fax: [phone]

## 2022-02-03 NOTE — PROGRESS NOTES
Speech Language Pathology  Facility/Department: [facility] PCU  Dysphagia Daily Treatment Note    NAME: Cindy Barriga  : 1940  MRN: [id number]    Patient Diagnosis(es):   Patient Active Problem List    Diagnosis Date Noted    Symptomatic sinus bradycardia 2022    Bilateral leg edema 2022    Symptomatic bradycardia     Weakness 2022    Pain of right lower extremity     Degloving injury     Leg laceration, unspecified laterality, initial encounter 10/31/2021    Altered mental status     Hyperkalemia     Encephalopathy acute 2021    Acute renal failure superimposed on stage 3 chronic kidney disease ([address] 02/15/2021    Urinary tract infection associated with indwelling urethral catheter ([address] 02/15/2021    Acute cystitis with hematuria 2021    Abnormal angiogram 2020    H/O angiography 2020    Abnormal stress test 2020    Constipation 2020    Encopresis with constipation and overflow incontinence 2020    Cellulitis of scrotum 2020    Acute renal injury ([address] 2020    Pseudomonas infection 2020    Dyspnea     Bradycardia     CHF with unknown LVEF ([address] 2020    Gross hematuria 2019    Chronic indwelling Iglesias catheter 2019    Hyperlipidemia 2019    Bilateral lower leg cellulitis 2019    Neurogenic bladder 2019    Bacteriuria 2019    Abnormality of urethral meatus 2019    Acute encephalopathy 10/08/2019    Problem with urinary catheter ([address] 10/07/2019    Edema 10/15/4771    Complicated UTI (urinary tract infection) 2019    Hypothermia 2019    Acute low back pain without sciatica 2017    Hip fracture, right ([address] 2015    Acute right hip pain     Lower GI bleeding 11/10/2014    CAD (coronary artery disease) 2014    S/P CABG x 5 2014    Cellulitis 2013    Essential hypertension 2013    GERD (gastroesophageal reflux disease) 12/16/2013    Acute blood loss anemia 12/16/2013    Tinea corporis 12/16/2013    Carotid stenosis 12/16/2013    Hypotension 11/30/2013    Light headed 11/30/2013     Allergies: Allergies   Allergen Reactions    Bactrim [Sulfamethoxazole-Trimethoprim] Rash       Recent Chest Xray: (01/29/2022)  Impression       1.  Stable left basilar pleural-parenchymal opacities and bilateral interstitial opacities.      Recent CT Head: (01/26/2022)      Impression       Head   1.  No evidence acute intracranial hemorrhage or mass effect. 2.  Mild to moderate atrophy.       Cervical spine   1.  No evidence of acute fracture or malalignment. 2.  Moderate multilevel cervical spondylosis.      Prior MBS (02/18/2021)  Oral Phase: Mild oral deficits characterized by weak lingual manipulation resulting in premature spillage to level of valleculae w/ all consistencies; mildly delayed swallow initiation and intermittent cues required to swallow. Piecemeal swallow noted x2 w/ thin liquids; statis of valleculae and cleared w/ secondary swallow. Pharyngeal: Mild pharyngeal phase characterized by vallecular residue that required secondary swallow to clear. No penetration or aspiration w/ any consistencies or trials. No pharyngeal residue. Upper Esophageal Screen: Adequate clearance of UES and proximal esophagus. Pt does endorsed acid reflux; further f/u w/ PCP or GI.       Chart reviewed. Medical Diagnosis: Symptomatic sinus bradycardia [R00.1]  Hypothermia, initial encounter [T68. XXXA]  Fall from bed, initial encounter [W06. XXXA]   Treatment Diagnosis: dysphagia    BSE Impression 1/29/22  Mild oral dysphagia 2/2 weakness and reduced dentition. Assessed tolerance ice chips, thins via cup/straw, puree, and soft solids. D/t manual dexterity issues, pt with improved liquid intake via cup w/ straw. Positive oral acceptance, mildly prolonged mastication, good oral clearance, no overt s/s aspiraiton. To facilitate oral phase, recommend dysphagia soft & bites-sized and thin liquids via straw, general aspiration precautions. Dysphagia Diagnosis: Mild oral stage dysphagia  MBS results - not indicated at this time    Pain: not indicated through any means    Current Diet : ADULT ORAL NUTRITION SUPPLEMENT; Breakfast, Lunch; Standard High Calorie/High Protein Oral Supplement  ADULT ORAL NUTRITION SUPPLEMENT; Dinner, Breakfast; Wound Healing Oral Supplement  ADULT DIET; Easy to Chew   Recommended Form of Meds: PO     Treatment:  Pt seen bedside to address the following goals:   Goal 1: Patient will tolerate least restrictive diet without overt signs of aspiration or associated decline in respiratory status. 1/30: pt sitting up in bed, feeding self breakfast, though appears pt would benefit from assistance, as many spills noted on tray. Pt confused, talking about working and needing . Pt required cues to discontinue talking while eating. Pt consumed puree and thin liquids via cup and straw with no overt signs of aspiration, voice remained clear. Pt demonstrated very prolonged mastication with oral residue occurring, when attempting to masticate bite sized sausage. No respiratory decline per chart review, pt on 1L O2. Recommend downgrading to dysphagia II minced and moist/thin liquids   Cont goal  1/31: pt with breakfast tray, consisting of cream of wheat/grits, multiple liquids and trial of hard solid. Pt exhibited adequate labial seal, no anterior loss of bolus. Pt exhibited prolonged mastication with hard solid, mild lingual residue. No oral residue/pocketing with the puree /soft solid items, cleared oral cavity completely. Occasional mild throat clear across PO trials, no coughing or change in voice occurred. No respiratory decline per chart review. D/w RN and medical resident  Goal met, cont to ensure consistency  2/2: pt sitting up in chair with PO on tray table.   Explained purpose of visit, assessing to determine ability to upgrade. Pt agreeable, stating he would like that. Pt analyzed with thin liquids via cup/straw, no cough/throat clear or change in voice occurred. Pt presented with full deven cracker for assessment of solids, pt with limited dentition. Pt took appropriate sized bites independently, cleared oral cavity then requested liquid to clear independently. Pt demonstrated improved awareness this date. Pt states he normally eats softer foods due to dentition, agreeable to easy to chew texture, when this was explained. Goal met, cont with upgraded texture. 2/3: diet was not upgraded, spoke with MD at end of session regarding recommendation. Pt sitting up in bed feeding self breakfast. Pt consumed all puree items and thin liquids with no cough/throat clear or change in voice. Swallow movement felt upon palpation of anterior neck. Pt re-assessed with solid texture. Pt demonstrated adequate mastication, no oral residue/pocketing. Due to limited dentition, recommend upgrade to Easy to Chew/thin liquids  Cont goal    Goal 2: Patient/caregiver will demonstrate understanding of swallowing concerns/recommendations  1/30: pt educated to purpose of visit, does not indicate comprehension  Cont goal  1/31:  Pt educated to rationale for current diet, strategies for improved safety of swallow and instruction to notify staff if any signs of aspiration emerge. Pt stated partial comprehension  Cont goal   2/2:  Pt educated to purpose of visit, and desire to upgrade texture if able. Pt demonstrated improved awareness and decreased confusion at this time. Pt demonstrated independent use of strategies to improve safety of swallow. Cont goal  2/3: pt educated to purpose of visit, emphasized importance of checking oral cavity for pocketing /residue. Pt demonstrated use of lingual sweep and alternated liquids/solids. Explained recommendation for upgrade of diet.   Pt stated comprehension  Goal met, cont to ensure consistency    Patient/Family/Caregiver Education:  As above    Compensatory Strategies: Alternate solids and liquids  Eat/Feed slowly  Upright as possible for all oral intake  Small bites/sips      Plan:  Continue dysphagia treatment with goals per plan of care. Diet recommendations: upgrade to Easy to Chew/thin liquids  DC recommendation: TBD  Treatment: 15  D/W nursing Shima  Needs met prior to leaving room, call button in reach. Elzbieta Hall M.S./CCC-SLP #[id number]  Pg.  # [phone]  If patient is discharged prior to next treatment, this note will serve as the discharge summary

## 2022-02-03 NOTE — PLAN OF CARE
Problem: Falls - Risk of:  Goal: Will remain free from falls  Description: Will remain free from falls  Outcome: Ongoing  Note: Fall precautions in place. Problem: Skin Integrity:  Goal: Will show no infection signs and symptoms  Description: Will show no infection signs and symptoms  Outcome: Ongoing  Note: Skin is kept clean and dry     Problem: Pain:  Goal: Pain level will decrease  Description: Pain level will decrease  Outcome: Ongoing  Note: Pain is controlled with medication.

## 2022-02-03 NOTE — PROGRESS NOTES
Patient a&ox4. VSS. Patient tolerated easy to chew diet well. Returned pt. To bed - was in chair most of the day. - returned pt to bed x2 with a stedy assist. Patient had large BM in diaper, changed and given full rich care, applied zinc to buttocks and folds as needed. Chronic schneider in place - Schneider care provided using soap and water.

## 2022-02-03 NOTE — PLAN OF CARE
General Internal Medicine Attending    Chart and data reviewed. Patient seen and examined, case discussed with medical resident. Physical exam repeated. Labs and imaging studies reviewed. Resident's note edited. Agree with documentation, assessment and plan as outlined. Symptomatic Bradycardia likely 2/2 Hypothermia, resolved . POA  Hypothermia, Likely 2/2 Hypothyroidism or infection: resolved. POA  Altered metabolic encephalopathy: resolved. POA  Possible aspiration pneumonia  Possible Sepsis. 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            Zen Galindo MD

## 2022-02-03 NOTE — PROGRESS NOTES
Progress Note    Admit Date: 1/26/2022  Diet: ADULT ORAL NUTRITION SUPPLEMENT; Breakfast, Lunch; Standard High Calorie/High Protein Oral Supplement  ADULT ORAL NUTRITION SUPPLEMENT; Dinner, Breakfast; Wound Healing Oral Supplement  ADULT DIET; Dysphagia - Minced and Moist; Low Sodium (2 gm)    CC: fall    Interval history:   No acute events overnight. Patient seen at bedside this morning, stable vitals and is afebrile. Patient is fairly responsive and aswering to all questions asked. He remains on room air with SpO2 above 95%. Medications:     Scheduled Meds:   levothyroxine  75 mcg Oral Daily    amoxicillin-clavulanate  1 tablet Oral 2 times per day    aspirin  81 mg Oral Daily    pantoprazole  40 mg Oral QAM AC    vitamin B-12  1,000 mcg Oral Daily    gabapentin  600 mg Oral BID    atorvastatin  80 mg Oral Nightly    sodium chloride flush  5-40 mL IntraVENous 2 times per day    enoxaparin  40 mg SubCUTAneous Daily    docusate sodium  200 mg Oral BID    bisacodyl  10 mg Rectal Daily     Continuous Infusions:   sodium chloride 25 mL (01/31/22 0313)     PRN Meds:ipratropium-albuterol, menthol-zinc oxide, melatonin, dicyclomine, albuterol, guaiFENesin, sodium chloride flush, sodium chloride, ondansetron **OR** ondansetron, polyethylene glycol, acetaminophen **OR** acetaminophen    Objective:   Vitals:   T-max:  Patient Vitals for the past 8 hrs:   BP Temp Temp src Pulse Resp SpO2   02/03/22 0824 124/66 98.2 °F (36.8 °C) Oral 55 18 92 %   02/03/22 0416 130/63 97.5 °F (36.4 °C) Oral 64  92 %       Intake/Output Summary (Last 24 hours) at 2/3/2022 0834  Last data filed at 2/2/2022 2300  Gross per 24 hour   Intake 740 ml   Output 1700 ml   Net -960 ml       Review of Systems   Constitutional: Positive for chills and fatigue. Negative for fever. Respiratory: Negative for cough, chest tightness and shortness of breath. Cardiovascular: Positive for palpitations. Negative for chest pain and leg swelling. Gastrointestinal: Positive for abdominal distention and constipation. Negative for abdominal pain, diarrhea, nausea and vomiting. Genitourinary: Negative for difficulty urinating. Neurological: Positive for dizziness, weakness and light-headedness. Physical Exam  Constitutional:       General: He is not in acute distress. Appearance: Normal appearance. HENT:      Head: Normocephalic and atraumatic. Nose: Nose normal.   Eyes:      Conjunctiva/sclera: Conjunctivae normal.      Pupils: Pupils are equal, round, and reactive to light. Cardiovascular:      Rate and Rhythm: Regular rhythm. Pulses: Normal pulses. Heart sounds: No murmur heard. No friction rub. No gallop. Pulmonary:      Effort: Pulmonary effort is normal. No respiratory distress. Breath sounds: Normal breath sounds. No wheezing. Abdominal:      General: Abdomen is flat. Bowel sounds are normal. There is distension. Hernia: A hernia is present. Musculoskeletal:      Cervical back: Normal range of motion. Skin:     General: Skin is warm and dry. Neurological:      Mental Status: He is alert and oriented to person, place, and time. Cranial Nerves: No cranial nerve deficit. Sensory: Sensation is intact. LABS:    CBC:   Recent Labs     02/01/22  0639 02/02/22  0621 02/03/22  0555   WBC 12.3* 8.6 6.9   HGB 7.9* 7.8* 7.4*   HCT 25.1* 24.1* 23.4*   * 118* 145   MCV 88.6 88.6 89.1     Renal:    Recent Labs     02/01/22  0640 02/02/22  0621 02/03/22  0555    137 139   K 4.7 4.2 4.3    106 107   CO2 23 24 22   BUN 32* 35* 33*   CREATININE 1.2 1.3 1.2   GLUCOSE 93 111* 81   CALCIUM 8.2* 8.2* 7.9*   ANIONGAP 10 7 10     Hepatic:   No results for input(s): AST, ALT, BILITOT, BILIDIR, PROT, LABALBU, ALKPHOS in the last 72 hours. Troponin:   No results for input(s): TROPONINI in the last 72 hours. BNP: No results for input(s): BNP in the last 72 hours.   Lipids: No results for input(s): CHOL, HDL in the last 72 hours. Invalid input(s): LDLCALCU, TRIGLYCERIDE  ABGs:    No results for input(s): PHART, MHT0APC, PO2ART, FUX5TJK, BEART, THGBART, U6TXDTKZ, OPH7XIL in the last 72 hours. INR: No results for input(s): INR in the last 72 hours. Lactate:   No results for input(s): LACTATE in the last 72 hours. Cultures:  -----------------------------------------------------------------  RAD:   XR CHEST PORTABLE   Final Result      1. Stable left basilar pleural-parenchymal opacities and bilateral interstitial opacities. CT CHEST ABDOMEN PELVIS W CONTRAST   Final Result     Findings likely consistent with stercoral proctitis. Cystitis    also suspected.       _______________________________________________       IMPRESSION:          Masslike consolidation of the left lung base which could be    secondary to a pneumonia though underlying neoplasm considered. Nearby pleural fluid collection concerning for an empyema. XR CHEST PORTABLE   Final Result      1. Decreased bilateral interstitial opacities which may represent improved interstitial edema. 2.  Mild cardiomegaly. 3.  Chronic left basilar pleural and parenchymal opacities, unchanged         XR TIBIA FIBULA RIGHT (2 VIEWS)   Final Result      Pelvis and right hip:   1. Diffuse demineralization limits evaluation. No evidence of acute fracture. 2.  Status post right femur ORIF without evidence of hardware complication. Right tib-fib:   1. No acute osseous findings. XR HIP 2-3 VW W PELVIS RIGHT   Final Result      Pelvis and right hip:   1. Diffuse demineralization limits evaluation. No evidence of acute fracture. 2.  Status post right femur ORIF without evidence of hardware complication. Right tib-fib:   1. No acute osseous findings. CT Head WO Contrast   Final Result      Head   1. No evidence acute intracranial hemorrhage or mass effect. 2.  Mild to moderate atrophy. Cervical spine   1. No evidence of acute fracture or malalignment. 2.  Moderate multilevel cervical spondylosis. CT Cervical Spine WO Contrast   Final Result      Head   1. No evidence acute intracranial hemorrhage or mass effect. 2.  Mild to moderate atrophy. Cervical spine   1. No evidence of acute fracture or malalignment. 2.  Moderate multilevel cervical spondylosis. XR HIP 2-3 VW W PELVIS RIGHT    (Results Pending)       Assessment/Plan:     1. Symptomatic Bradycardia likely 2/2 Hypothermia, resolved   Patient was found down for unknown amount of time. Patient has previous hx of bradycardia with HR down to 30s in previous hospitalizations. Was supposed to have a CAM monitor placed which wasn't placed. Patient is not on any remington agents. Bradycardia is improving with improvements in body temperature. - On telemetry   - Omaira NICOLE followin week CAM on discharge    2. Hypothermia, resolved  Likely 2/2 Hypothyroidism or Anemia vs Influenza   Patient was hypothermic on arrival with T 90.3. He reports fatigue, constipation, and difficulty concentrating. TSH 5.19 in 2022 with normal T4. Denies weight gain, reporting weight loss. CBC showed signs of anemia with Hgb of 8.6 and Hct 26.9. Had fall with unknown fall time. Currently T 97.9.  - TSH 5.4 with reflex to T4 1, started on synthyroid 75 mcg  - Normal cortisol levels, recent 12   - Influenza B positive, s/p Tamiflu 30 mg BID    3. Altered mental status, resolved   Patient was less responsive last afternoon and had a drop in O2 saturation. A stat EKG and chest Xray was ordered which were stable.  Patient was also having a weak cough with concerns of aspiration, respiratory therapist and Dr Bernie Sibley was at bedside with improvement in patients status thereafter following deep suctioning  -continue on dysphagia diet as per recommendations   -s/p D5 in the setting of hypernatremia, which is corrected   -s/p unasyn for concerns of aspiration, started on PO augmentin every 12 hrs     4. Rule out Sepsis   Patient was hypothermic on arrival with bradycardia and intermittently confused. - Procalcitonin 0.12  - Blood cultures show NGTD   - On Augmentin every 12 hrs     5. Cystitis  UA on 1/17 was cloudy with 10-20 WBC and moderate leukocyte esterase. UA today showed 3+ bacteria and trace leukocyte esterase. CT Abd/Pelvis showed thickening of the bladder consistent with possible cystitis. Patient has chronic schneider catheter which was replaced 10 days ago. - Replaced schneider catheter  - urine cultures positive for Trichosporon and E fecalis, s/p Unasyn   - F/u sensitivities    6. Troponinemia  - Troponin 0.05 on arrival, recent 0.11, s/p EKG which showed no new changes from prior    7. Constipation  Patient reports that he has been constipated for 2 weeks. - Home meds  - started on dulcolax suppository   - Continue to monitor    8. 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 Chris Haynes care following     9. BL LE Neuropathy  - Continue Gabapentin    10. Concern for malignancy  - Patient has a smoking history. Denies any alcohol use. Reports anorexia and recent weight loss. He denies hemoptysis currently. - CT Abd/Pelvis/Chest showed left lower lobe consolidation suspicious of pneumonia vs malignancy  -consulted pulmonology: mass likely 2/2 rounded atelectasis, no further pulm w/o required      Code Status: DNR-CCA   FEN: ADULT ORAL NUTRITION SUPPLEMENT; Breakfast, Lunch; Standard High Calorie/High Protein Oral Supplement  ADULT ORAL NUTRITION SUPPLEMENT; Dinner, Breakfast; Wound Healing Oral Supplement  ADULT DIET;  Dysphagia - Minced and Moist; Low Sodium (2 gm)  PPX: Lovenox  DISPO: pending precert    Dolores Porter MD, PGY-1  02/03/22  8:34 AM    This patient has been staffed and discussed with Lucille Elise MD.

## 2022-02-03 NOTE — PROGRESS NOTES
VSS A/Ox4 pt up to chair with use of steady. Pt is a set up feed but is able to feed himself. Pt is very weak upper extremities during assessment and unable to keep arm elevated for extended period. Pt is calm/cooperative. Pt had no c/o pain at this time. Fall precautions in place call light in reach will continue to monitor.

## 2022-02-04 LAB
ANION GAP SERPL CALCULATED.3IONS-SCNC: 11 MMOL/L (ref 3–16)
BASOPHILS ABSOLUTE: 0.1 K/UL (ref 0–0.2)
BASOPHILS RELATIVE PERCENT: 0.7 %
BUN BLDV-MCNC: 28 MG/DL (ref 7–20)
CALCIUM SERPL-MCNC: 8.1 MG/DL (ref 8.3–10.6)
CHLORIDE BLD-SCNC: 107 MMOL/L (ref 99–110)
CO2: 22 MMOL/L (ref 21–32)
CREAT SERPL-MCNC: 1.2 MG/DL (ref 0.8–1.3)
EOSINOPHILS ABSOLUTE: 0.6 K/UL (ref 0–0.6)
EOSINOPHILS RELATIVE PERCENT: 6.6 %
GFR AFRICAN AMERICAN: >60
GFR NON-AFRICAN AMERICAN: 58
GLUCOSE BLD-MCNC: 106 MG/DL (ref 70–99)
HCT VFR BLD CALC: 24.5 % (ref 40.5–52.5)
HEMOGLOBIN: 7.9 G/DL (ref 13.5–17.5)
LYMPHOCYTES ABSOLUTE: 1.1 K/UL (ref 1–5.1)
LYMPHOCYTES RELATIVE PERCENT: 12.6 %
MCH RBC QN AUTO: 29.1 PG (ref 26–34)
MCHC RBC AUTO-ENTMCNC: 32.4 G/DL (ref 31–36)
MCV RBC AUTO: 89.8 FL (ref 80–100)
MONOCYTES ABSOLUTE: 0.5 K/UL (ref 0–1.3)
MONOCYTES RELATIVE PERCENT: 5.9 %
NEUTROPHILS ABSOLUTE: 6.6 K/UL (ref 1.7–7.7)
NEUTROPHILS RELATIVE PERCENT: 74.2 %
PDW BLD-RTO: 18.4 % (ref 12.4–15.4)
PLATELET # BLD: 185 K/UL (ref 135–450)
PMV BLD AUTO: 9.4 FL (ref 5–10.5)
POTASSIUM REFLEX MAGNESIUM: 4.1 MMOL/L (ref 3.5–5.1)
RBC # BLD: 2.73 M/UL (ref 4.2–5.9)
SODIUM BLD-SCNC: 140 MMOL/L (ref 136–145)
WBC # BLD: 8.8 K/UL (ref 4–11)

## 2022-02-04 PROCEDURE — 6370000000 HC RX 637 (ALT 250 FOR IP)

## 2022-02-04 PROCEDURE — 1200000000 HC SEMI PRIVATE

## 2022-02-04 PROCEDURE — 80048 BASIC METABOLIC PNL TOTAL CA: CPT

## 2022-02-04 PROCEDURE — 2580000003 HC RX 258

## 2022-02-04 PROCEDURE — 85025 COMPLETE CBC W/AUTO DIFF WBC: CPT

## 2022-02-04 PROCEDURE — 6360000002 HC RX W HCPCS

## 2022-02-04 PROCEDURE — 36415 COLL VENOUS BLD VENIPUNCTURE: CPT

## 2022-02-04 PROCEDURE — 6370000000 HC RX 637 (ALT 250 FOR IP): Performed by: STUDENT IN AN ORGANIZED HEALTH CARE EDUCATION/TRAINING PROGRAM

## 2022-02-04 RX ADMIN — LEVOTHYROXINE SODIUM 75 MCG: 0.07 TABLET ORAL at 06:13

## 2022-02-04 RX ADMIN — AMOXICILLIN AND CLAVULANATE POTASSIUM 1 TABLET: 875; 125 TABLET, FILM COATED ORAL at 07:59

## 2022-02-04 RX ADMIN — SODIUM CHLORIDE, PRESERVATIVE FREE 10 ML: 5 INJECTION INTRAVENOUS at 21:14

## 2022-02-04 RX ADMIN — ENOXAPARIN SODIUM 40 MG: 100 INJECTION SUBCUTANEOUS at 08:00

## 2022-02-04 RX ADMIN — DOCUSATE SODIUM 200 MG: 100 CAPSULE, LIQUID FILLED ORAL at 21:12

## 2022-02-04 RX ADMIN — DOCUSATE SODIUM 200 MG: 100 CAPSULE, LIQUID FILLED ORAL at 07:59

## 2022-02-04 RX ADMIN — SODIUM CHLORIDE, PRESERVATIVE FREE 10 ML: 5 INJECTION INTRAVENOUS at 07:59

## 2022-02-04 RX ADMIN — GABAPENTIN 600 MG: 600 TABLET, FILM COATED ORAL at 21:13

## 2022-02-04 RX ADMIN — GABAPENTIN 600 MG: 600 TABLET, FILM COATED ORAL at 07:59

## 2022-02-04 RX ADMIN — BISACODYL 10 MG: 10 SUPPOSITORY RECTAL at 07:59

## 2022-02-04 RX ADMIN — PANTOPRAZOLE SODIUM 40 MG: 40 TABLET, DELAYED RELEASE ORAL at 06:13

## 2022-02-04 RX ADMIN — ASPIRIN 81 MG: 81 TABLET, CHEWABLE ORAL at 08:00

## 2022-02-04 RX ADMIN — AMOXICILLIN AND CLAVULANATE POTASSIUM 1 TABLET: 875; 125 TABLET, FILM COATED ORAL at 21:12

## 2022-02-04 RX ADMIN — ACETAMINOPHEN 650 MG: 325 TABLET ORAL at 21:12

## 2022-02-04 RX ADMIN — ATORVASTATIN CALCIUM 80 MG: 40 TABLET, FILM COATED ORAL at 21:12

## 2022-02-04 RX ADMIN — CYANOCOBALAMIN TAB 1000 MCG 1000 MCG: 1000 TAB at 07:59

## 2022-02-04 ASSESSMENT — PAIN SCALES - GENERAL
PAINLEVEL_OUTOF10: 0
PAINLEVEL_OUTOF10: 3

## 2022-02-04 NOTE — PLAN OF CARE
Problem: Nutrition  Goal: Optimal nutrition therapy  Outcome: Ongoing  Note: Nutrition Problem #1: Inadequate oral intake  Intervention: Food and/or Nutrient Delivery: Continue Current Diet,Continue Oral Nutrition Supplement  Nutritional Goals: pt will consistently consume >50% PO intake of meals and ONS through adm

## 2022-02-04 NOTE — PROGRESS NOTES
Patient alert and oriented x4. VSS. No acute distress noted. Patient very weak. Patient denies any pain. Patient had a small BM and was cleaned up using barrier wipes. Cream applied. Buttocks are very red and excoriated. Patient being turned Q2 hours using wedge. Bedside table and call light within reach. Bed alarm on. Will continue to monitor.

## 2022-02-04 NOTE — PLAN OF CARE
Problem: Falls - Risk of:  Goal: Will remain free from falls  Note: Patient has remained free from falls during shift. Fall precautions in place. Call light and bedside table are within reach. Patient calls out as needed. Problem: Pain:  Description: Pain management should include both nonpharmacologic and pharmacologic interventions. Goal: Pain level will decrease  Note: Patient has not reported any pain during shift. PRN pain medication is available if needed.

## 2022-02-04 NOTE — CARE COORDINATION
Cm following, spoke with pt and son Madisyn Uribe, the son just toured [facility] and they plan eval on Monday. I explained the pt has been DC from acute level of care since 2/2/22 and that each day is approx $3000 a day the pt would owe so we need to get eval moved up. I called Bobby Cagle at [facility]  ct  [phone] who gave me the RN evaluating pt for admission Cherise Delgado [phone], Cherise Delgado will do the Eval and have an answer by Sat morning. Pt and Son aware that if [facility] cannot accept  Then pt will have to DC home with family support, Privat pay options for 24/7 care and [place] [phone] until further arrangements can be made. IMM letter provided to son.   Electronically signed by Iris Elliott RN on 2/4/2022 at 4:05 PM  [phone]

## 2022-02-04 NOTE — PROGRESS NOTES
Dressing to RLE was changed. Adaptic placed, abd pad on top and wrapped with kerlex.     Electronically signed by Morales Mckeon RN on 2/4/2022 at 6:05 PM

## 2022-02-04 NOTE — PROGRESS NOTES
Comprehensive Nutrition Assessment    RECOMMENDATIONS:  1. PO Diet: Continue easy to chew diet per SLP. 2. ONS: Continue Ensure Enlive with meals and wound healing supplement BID    NUTRITION ASSESSMENT:    Nutritional summary & status: Pt continues with improved po intakes. Majority of meals recorded >76% po for the last 2 days. Notes pt consuming ~50% of ONS. Weights stable since adm. Suspect error in bed scale weights from 1/31 and 2/03 as they are outlier weights. Noted discharge plan. Will continue to monitor po intakes.     Admission/PMH: Admit w/ Symptomatic Bradycardia, Cystitis, Rhabdomyolysis, RLE Chronic Venous Stasis Dermatitis with Partial Thickness Ulceration; PMH CAD, status post CABG, heart failure preserved LV ejection fraction, LV ejection fraction 55 to 60%, hypertension, hyperlipidemia, BPH, mildly positive stress test by 2020, status post coronary angiogram     MALNUTRITION ASSESSMENT  Context of Malnutrition: Acute Illness   Malnutrition Status: No malnutrition  Findings of the 6 clinical characteristics of malnutrition (Minimum of 2 out of 6 clinical characteristics is required to make the diagnosis of moderate or severe Protein Calorie Malnutrition based on AND/ASPEN Guidelines):  Energy Intake: Mild decrease in energy intake (comment)   Energy Intake Time: No significant  -po improved  Weight Loss %: No significant loss   Weight loss Time: No significant      NUTRITION DIAGNOSIS   Inadequate oral intake related to  (decreased appetite) as evidenced by intake 26-50%,poor intake prior to admission    [address] and/or Nutrient Delivery:  Continue Current Diet,Continue Oral Nutrition Supplement  Nutrition Education/Counseling:  No recommendation at this time   Goals:  pt will consistently consume >50% PO intake of meals and ONS through adm       Nutrition Monitoring and Evaluation:   Food/Nutrient Intake Outcomes:  Food and Nutrient Intake,Supplement Intake  Physical Signs/Symptoms Outcomes:  Biochemical Data,Weight     OBJECTIVE DATA: Significant to nutrition assessment  · Nutrition-Focused Physical Findings: +2 BLE edema; lbm 2/3  · Labs: Reviewed  · Meds: Reviewed; vitamin B12, dulcolax, colace, synthroid  · Wounds: Venous Stasis,Wound Consult Pending (bilateral lower extremities and buttocks)       CURRENT NUTRITION THERAPIES  ADULT ORAL NUTRITION SUPPLEMENT; Breakfast, Lunch; Standard High Calorie/High Protein Oral Supplement  ADULT ORAL NUTRITION SUPPLEMENT; Dinner, Breakfast; Wound Healing Oral Supplement  ADULT DIET; Easy to Chew     PO Intake: %   PO Supplement Intake:26-50%  Additional Sources of Calories/IVF:n/a     ANTHROPOMETRICS  Current Height: 5' 11\" (180.3 cm)  Current Weight: 208 lb 5.4 oz (94.5 kg)    Admission weight: 207 lb (93.9 kg)  Ideal Body Weight (IBW): 172 lbs  (78 kg)    Usual Bodyweight  (185-205lb per EMR)   Weight Changes: wt fluctuates d/t fluid shifts. Overall stable  BMI: 29.1    Wt Readings from Last 50 Encounters:   01/27/22 205 lb 4 oz (93.1 kg)   01/20/22 207 lb 3.7 oz (94 kg)   12/28/21 185 lb (83.9 kg)   11/10/21 184 lb 4.8 oz (83.6 kg)   11/03/21 199 lb 4.7 oz (90.4 kg)   09/27/21 187 lb (84.8 kg) stated   09/26/21 187 lb (84.8 kg) stated   08/30/21 187 lb 9.6 oz (85.1 kg) estimated   06/22/21 207 lb (93.9 kg)   05/31/21 213 lb (96.6 kg)     COMPARATIVE STANDARDS  Energy (kcal):  5575-1215; Weight Used for Energy Requirements:  Admission (93kg)     Protein (g):   (1.2-1.4g/kg); Weight Used for Protein Requirements:  Ideal        Fluid (ml/day):  7985-9345; Method Used for Fluid Requirements:  1 ml/kcal      The patient will still be monitored per nutrition standards of care. Consult dietitian if nutrition interventions essential to patient care is needed.      Isael Au, [address] [place] [address] Haitian: [phone]  Office:  [phone]

## 2022-02-04 NOTE — DISCHARGE SUMMARY
INTERNAL MEDICINE DEPARTMENT AT [address] [place] [address]  DISCHARGE SUMMARY    Patient ID: Pratima Tyler [facility]                                             Discharge Date: 2/4/2022   Patient's PCP: Deepa Olivares                                          Discharge Physician: Stevie Alford MD MD  Admit Date: 1/26/2022   Admitting Physician: Gerald Starkey DO    PROBLEMS DURING HOSPITALIZATION:  Present on Admission:   Hypothermia   Symptomatic sinus bradycardia      DISCHARGE DIAGNOSES: -Symptomatic bradycardia 2/2 hypothermia                                                   -Hypothermia likely 2/2 hypothyroidism vs influenza infection     HPI: [de-identified]years old male with past medical history of carotid stenosis, significant for CAD, status post CABG, heart failure preserved LV ejection fraction, LV ejection fraction 55 to 60%, hypertension, hyperlipidemia, BPH, mildly positive stress test by 2020, status post coronary angiogram by Dr. Manjit Saavedra which showed patent coronaries and grafts presented to the ED after a fall at home.      He lives alone by himself and is wheelchair-bound. He was found down for an unknown amount of time by his daughter who called the EMS who brought the patient to the hospital. The patient patient reported that he fell as he was transferring himself to the wheelchair, reported no loss of consciousness or injury to head. On arrival to the ED he did not complain of any pain but looked disheveled and had crumbs of food around his mouth.      In the ED he was found to be intermittently confused and was hypothermic to 90.3 Fahrenheit, heart rate 59 sinus rhythm, blood pressure 114/75 and saturating 100% on room air. Of note the patient has a recent hospitalization on 01/17/2022 when he presented to the ED with complaint of weakness and fatigue and was noted to be bradycardic and later became hypothermic.  Sepsis work-up then was negative, blood cultures and urine cultures from recent hospitalization are negative. He has a chronic Iglesias that was replaced. The patient was seen by cardiology and was supposed to get a 2-week CAM monitor for bradycardia prior to discharge but the monitor could not be placed. Patient also got a arterial duplex of bilateral lower extremities that showed a right ROSARIO of 1.5 greater than 50% stenosis at the mid popliteal artery and abnormal monophasic Doppler waveforms. Left extremity ROSARIO was 1.32, no vascular surgery intervention was recommended inpatient and it was recommended that patient follows outpatient with Dr. Dre Mercado. Of note, case management was following the case inpatient and per the note, the patient had to clear balance and do a down deposit if he wishes to go to [address] was admitted and was given a kole hugger after which the hypothermia improved as so did his bradycardia. A full workup was done to rule out the cause of his hypothermia as this was his second presentation with similar symptoms. Patient was influenza B positive and was started on tamiflu. Patients TSH was elevated with concerns of subclinical hypothyroidism, and therefore patient was started on levothyroxine. Patient was also less responsive during his hospitalization course and had a drop in O2 saturation. A stat EKG and chest Xray was ordered which were stable. Patient was also having a weak cough with concerns of aspiration, respiratory therapist and Dr Kenia Marsh was at bedside with improvement in patients status thereafter following deep suctioning, patient was kept NPO, SLP was consulted and was started on unasyn due to concerns of aspiration. Patients UA was cloudy with 10-20 WBC and moderate leukocyte esterase. UA showed 3+ bacteria and trace leukocyte esterase. CT Abd/Pelvis showed thickening of the bladder consistent with possible cystitis. urine cultures positive for Trichosporon and E fecalis, s/p Unasyn.    CT Abd/Pelvis/Chest showed left lower lobe consolidation suspicious of pneumonia vs malignancy, followed by pulmonology was consulted, who recommended that mass likely 2/2 rounded atelectasis, no further pulm w/o required. Patient was slowly transitioned to easy to chew diet as per SLP recs. Patient was doing better with improvement in the mental status and overall health and therefore decision was made to discharge the patient to ARU with follow up with PCP in 1 week, EP in 2 weeks. Patient is supposed to continue synthyroid with repeat TSH levels in 4-6 weeks. Physical Exam:  Physical Exam  Constitutional:       Appearance: Normal appearance. HENT:      Head: Normocephalic and atraumatic. Right Ear: External ear normal.      Left Ear: External ear normal.      Nose: Nose normal.      Mouth/Throat:      Mouth: Mucous membranes are moist.   Eyes:      Pupils: Pupils are equal, round, and reactive to light. Cardiovascular:      Rate and Rhythm: Normal rate and regular rhythm. Pulses: Normal pulses. Heart sounds: Normal heart sounds. Pulmonary:      Effort: Pulmonary effort is normal.   Abdominal:      General: There is no distension. Palpations: Abdomen is soft. Tenderness: There is no abdominal tenderness. Musculoskeletal:         General: Normal range of motion. Skin:     General: Skin is warm. Neurological:      Mental Status: He is alert. Mental status is at baseline. Psychiatric:         Thought Content:  Thought content normal.          Consults: cardiology, pulmonolgy  Significant Diagnostic Studies:  chest x-ray, CT chest abdomen, CT head wo contrast, CT cervical spine , Xray tibia fibula and hip   Treatments: IV hydration, antibiotics  Disposition: ARU  Discharged Condition: Stable  Follow Up: Primary Care Physician in one week and EP in 2 weeks     DISCHARGE MEDICATION:       Medication List      START taking these medications    amoxicillin-clavulanate 875-125 MG per tablet  Commonly known as: AUGMENTIN  Take 1 tablet by mouth every 12 hours for 5 doses     levothyroxine 75 MCG tablet  Commonly known as: SYNTHROID  Take 1 tablet by mouth Daily        CHANGE how you take these medications    docusate sodium 100 MG capsule  Commonly known as: COLACE  What changed: Another medication with the same name was removed. Continue taking this medication, and follow the directions you see here.         CONTINUE taking these medications    albuterol sulfate  (90 Base) MCG/ACT inhaler  Commonly known as: Proventil HFA  Inhale 2 puffs into the lungs every 6 hours as needed for Wheezing or Shortness of Breath     aspirin 81 MG chewable tablet  Take 1 tablet by mouth daily     atorvastatin 80 MG tablet  Commonly known as: LIPITOR     dicyclomine 20 MG tablet  Commonly known as: BENTYL     ferrous sulfate 325 (65 Fe) MG tablet  Commonly known as: IRON 325  Take 1 tablet by mouth 2 times daily     gabapentin 600 MG tablet  Commonly known as: NEURONTIN     guaiFENesin 600 MG extended release tablet  Commonly known as: MUCINEX  Take 1 tablet by mouth 2 times daily as needed for Congestion     Melatonin 10 MG Tabs     pantoprazole 40 MG tablet  Commonly known as: PROTONIX  Take 1 tablet by mouth every morning (before breakfast)     tamsulosin 0.4 MG capsule  Commonly known as: FLOMAX     Venelex Oint ointment     vitamin B-12 1000 MCG tablet  Commonly known as: CYANOCOBALAMIN        STOP taking these medications    furosemide 40 MG tablet  Commonly known as: LASIX     zolpidem 10 MG tablet  Commonly known as: AMBIEN           Where to Get Your Medications      These medications were sent to [address] [place] [address] [facility] [phone] - [phone]  [address] [place] [address]    Phone: [phone]   · amoxicillin-clavulanate 875-125 MG per tablet  · levothyroxine 75 MCG tablet          Activity: activity as tolerated  Diet: Easy to chew food with thin liquids   Wound Care: none needed    Time Spent on discharge is more than 30 minutes    Signed:  Araceli Hi MD,  MD, PGY-1  2/4/2022

## 2022-02-04 NOTE — CARE COORDINATION
SW sent additional information to [facility] per their request.   Pt's son informed SW he is so far is okay w/Pt going to [facility]. SW is waiting for [facility]'s acceptance.     ARIC Aldridge, LSW  Social Work/Case Management   ICU/PCU - [facility] [place], [facility].   Ph: [phone]

## 2022-02-04 NOTE — PROGRESS NOTES
Patient A&OX4. VSS. Patient has not reported any pain or nausea during shift. Patient remains very weak. Patient was changed and bathed during shift. Buttocks remain very red and excoriated. Patient has been tolerating diet as ordered. All patient needs have been met at this time. Fall precautions are in place. Call light and bedside table are within reach. Patient calls out as needed. Will continue to monitor.      Electronically signed by Nighat Sosa RN on 2/4/2022 at 2:09 PM

## 2022-02-05 LAB
ANION GAP SERPL CALCULATED.3IONS-SCNC: 5 MMOL/L (ref 3–16)
BASOPHILS ABSOLUTE: 0.1 K/UL (ref 0–0.2)
BASOPHILS RELATIVE PERCENT: 0.9 %
BUN BLDV-MCNC: 25 MG/DL (ref 7–20)
CALCIUM SERPL-MCNC: 8.3 MG/DL (ref 8.3–10.6)
CHLORIDE BLD-SCNC: 108 MMOL/L (ref 99–110)
CO2: 27 MMOL/L (ref 21–32)
CREAT SERPL-MCNC: 1.1 MG/DL (ref 0.8–1.3)
EOSINOPHILS ABSOLUTE: 0.6 K/UL (ref 0–0.6)
EOSINOPHILS RELATIVE PERCENT: 8.9 %
GFR AFRICAN AMERICAN: >60
GFR NON-AFRICAN AMERICAN: >60
GLUCOSE BLD-MCNC: 91 MG/DL (ref 70–99)
HCT VFR BLD CALC: 24.3 % (ref 40.5–52.5)
HEMOGLOBIN: 7.7 G/DL (ref 13.5–17.5)
LYMPHOCYTES ABSOLUTE: 1.1 K/UL (ref 1–5.1)
LYMPHOCYTES RELATIVE PERCENT: 17.2 %
MCH RBC QN AUTO: 28.3 PG (ref 26–34)
MCHC RBC AUTO-ENTMCNC: 31.9 G/DL (ref 31–36)
MCV RBC AUTO: 88.7 FL (ref 80–100)
MONOCYTES ABSOLUTE: 0.5 K/UL (ref 0–1.3)
MONOCYTES RELATIVE PERCENT: 8.4 %
NEUTROPHILS ABSOLUTE: 4.1 K/UL (ref 1.7–7.7)
NEUTROPHILS RELATIVE PERCENT: 64.6 %
PDW BLD-RTO: 17.7 % (ref 12.4–15.4)
PLATELET # BLD: 220 K/UL (ref 135–450)
PMV BLD AUTO: 9.1 FL (ref 5–10.5)
POTASSIUM REFLEX MAGNESIUM: 4 MMOL/L (ref 3.5–5.1)
RBC # BLD: 2.74 M/UL (ref 4.2–5.9)
SODIUM BLD-SCNC: 140 MMOL/L (ref 136–145)
WBC # BLD: 6.4 K/UL (ref 4–11)

## 2022-02-05 PROCEDURE — 6370000000 HC RX 637 (ALT 250 FOR IP)

## 2022-02-05 PROCEDURE — 6360000002 HC RX W HCPCS

## 2022-02-05 PROCEDURE — 1200000000 HC SEMI PRIVATE

## 2022-02-05 PROCEDURE — 6370000000 HC RX 637 (ALT 250 FOR IP): Performed by: STUDENT IN AN ORGANIZED HEALTH CARE EDUCATION/TRAINING PROGRAM

## 2022-02-05 PROCEDURE — 80048 BASIC METABOLIC PNL TOTAL CA: CPT

## 2022-02-05 PROCEDURE — 2580000003 HC RX 258

## 2022-02-05 PROCEDURE — 36415 COLL VENOUS BLD VENIPUNCTURE: CPT

## 2022-02-05 PROCEDURE — 85025 COMPLETE CBC W/AUTO DIFF WBC: CPT

## 2022-02-05 RX ADMIN — CYANOCOBALAMIN TAB 1000 MCG 1000 MCG: 1000 TAB at 09:47

## 2022-02-05 RX ADMIN — PANTOPRAZOLE SODIUM 40 MG: 40 TABLET, DELAYED RELEASE ORAL at 06:48

## 2022-02-05 RX ADMIN — GABAPENTIN 600 MG: 600 TABLET, FILM COATED ORAL at 09:48

## 2022-02-05 RX ADMIN — SODIUM CHLORIDE, PRESERVATIVE FREE 10 ML: 5 INJECTION INTRAVENOUS at 21:24

## 2022-02-05 RX ADMIN — SODIUM CHLORIDE, PRESERVATIVE FREE 10 ML: 5 INJECTION INTRAVENOUS at 09:48

## 2022-02-05 RX ADMIN — ACETAMINOPHEN 650 MG: 325 TABLET ORAL at 21:52

## 2022-02-05 RX ADMIN — ASPIRIN 81 MG: 81 TABLET, CHEWABLE ORAL at 09:47

## 2022-02-05 RX ADMIN — AMOXICILLIN AND CLAVULANATE POTASSIUM 1 TABLET: 875; 125 TABLET, FILM COATED ORAL at 09:48

## 2022-02-05 RX ADMIN — LEVOTHYROXINE SODIUM 75 MCG: 0.07 TABLET ORAL at 06:48

## 2022-02-05 RX ADMIN — ATORVASTATIN CALCIUM 80 MG: 40 TABLET, FILM COATED ORAL at 21:23

## 2022-02-05 RX ADMIN — ANORECTAL OINTMENT: 15.7; .44; 24; 20.6 OINTMENT TOPICAL at 21:24

## 2022-02-05 RX ADMIN — GABAPENTIN 600 MG: 600 TABLET, FILM COATED ORAL at 21:24

## 2022-02-05 RX ADMIN — AMOXICILLIN AND CLAVULANATE POTASSIUM 1 TABLET: 875; 125 TABLET, FILM COATED ORAL at 21:24

## 2022-02-05 RX ADMIN — ENOXAPARIN SODIUM 40 MG: 100 INJECTION SUBCUTANEOUS at 09:47

## 2022-02-05 RX ADMIN — DOCUSATE SODIUM 200 MG: 100 CAPSULE, LIQUID FILLED ORAL at 09:48

## 2022-02-05 ASSESSMENT — PAIN SCALES - GENERAL
PAINLEVEL_OUTOF10: 3
PAINLEVEL_OUTOF10: 2

## 2022-02-05 ASSESSMENT — ENCOUNTER SYMPTOMS
CHEST TIGHTNESS: 0
ABDOMINAL DISTENTION: 1
SHORTNESS OF BREATH: 0
ABDOMINAL PAIN: 0
CONSTIPATION: 1
COUGH: 0
VOMITING: 0
DIARRHEA: 0
NAUSEA: 0

## 2022-02-05 NOTE — PROGRESS NOTES
Physician Progress Note      Kristan Amaya  CSN #:                  [id number]  :                       1940  ADMIT DATE:       2022 8:07 PM  [address] [place] DATE:  RESPONDING  PROVIDER #:        Rosendo Argueta MD          QUERY TEXT:    Patient admitted with bradycardia 2/2 hypothermia. Per  PN, noted   documentation of aspiration pneumonia being ruled out by resident as answer to   earlier query. Per 2/3 Hospital attestation states possible aspiration and   DC Summary states aspiration as well. Please clarify likely status opf   aspiration such as: If possible, please document in progress notes and discharge summary if you   are evaluating and /or treating any of the following: The medical record reflects the following:  Risk Factors: [de-identified] y.o. male fjound down, symptomatic bradycardia likely 2/2   hypothermia, rhabdomyolysis, anemia, hypotension  Clinical Indicators:  patient less responsive and desat. CXR with stable   left basilar pleural-parencymal opacities and bilateral interstitial   opacities. Improved after deep suctioning  Treatment: patient was started on unasyn for concerns of aspiration PNA -   changed to augmentin today. Options provided:  -- Aspiration pneumonia confirmed  -- Aspiration pneumonia ruled out  -- Other - I will add my own diagnosis  -- Disagree - Not applicable / Not valid  -- Disagree - Clinically unable to determine / Unknown  -- Refer to Clinical Documentation Reviewer    PROVIDER RESPONSE TEXT:    After study, aspiration pneumonia confirmed. Query created by: Kilo Bill on 2022 5:23 PM      QUERY TEXT:    Patient admitted  with bradycardia 2/2 hypothermia. Noted to have Rule out   sepsis documented throughout the chart. Per 2/3 Hospitalist attestation,   documentation of possible sepsis POA. No mention of sepsis on DC Summary as   being confirmed or ruled out.  If possible, please document in progress notes   and discharge summary if you are evaluating and/or treating any of the   following: The medical record reflects the following:  Risk Factors: [de-identified] y.o. male with [id number] bradycardia 2/2 hypothermia, UTI  Clinical Indicators: Hypothermic with presenting temp of 90.4 rectal (32.4 C),   Resp 8-20 Procal on presentation 0.04,  Lactic acid: 1.1  Treatment: amoxicillin, unasyn  Options provided:  -- Sepsis ruled out  -- Sepsis confirmed likely present at time of order to admit;; Sepsis   confirmed likely present at time of admit. -- Other - I will add my own diagnosis  -- Disagree - Not applicable / Not valid  -- Disagree - Clinically unable to determine / Unknown  -- Refer to Clinical Documentation Reviewer    PROVIDER RESPONSE TEXT:    After study, sepsis ruled out. Query created by: Tj Geller on 2/4/2022 5:45 PM      QUERY TEXT:    Pt admitted 1/26 with bradycardia, hypothermia and UTI. Pt noted to have   chronic indwelling urinary catheter that was changed 10 days prior to this   admission. Per 2/2 and 2/3 PNs:Complicated UTI: Trichosporon and E Fecalis:   POA Possibly sec to chronic schneider catheter. No mention of UTI being 2/2   chronic schneider in 2/4 DC Summary. If possible, please document in the progress   notes and discharge summary if you are evaluating and/or treating any of the   following: The medical record reflects the following:  Risk Factors: [de-identified] y.o. male with UTI and chronic schneider catheter which was   changed 10 days prior to this admission. Clinical Indicators: per UCX 1/26: Enterococcus faecalis ? Abnormal?>100,000   CFU/ml  Treatment: bld cx, unasyn  Options provided:  -- UTI likely due to chronic indwelling urinary catheter  -- UTI likely due to previous urinary catheter  -- UTI not due to indwelling urinary catheter  -- Other - I will add my own diagnosis  -- Disagree - Not applicable / Not valid  -- Disagree - Clinically unable to determine / Unknown  -- Refer to Clinical Documentation

## 2022-02-05 NOTE — PLAN OF CARE
Pt reported desire to get some rest, mutually agreed to return a bit later for meds and assessments. Pt declined suppository, reporting BM earlier today. Positive reinforcement provided for PO intake and supplement. Problem: Falls - Risk of:  Goal: Will remain free from falls  Description: Will remain free from falls  2/5/2022 1042 by Dulce Pacheco RN  Outcome: Ongoing  Note: Standard safety measures in place. Problem: Skin Integrity:  Goal: Absence of new skin breakdown  Description: Absence of new skin breakdown  Outcome: Ongoing  Note: Pt encouraged to turn, stated he will after finished with breakfast.      Problem: Pain:  Goal: Pain level will decrease  Description: Pain level will decrease  Outcome: Ongoing  Note: Pt reports level of pain is low, declines intervention at this time. Agrees to report increase. Problem: Mood - Altered:  Goal: Mood stable  Description: Mood stable  Outcome: Ongoing  Note: Brash voice at times, but pleasant and cooperative.

## 2022-02-05 NOTE — PROGRESS NOTES
Progress Note    Admit Date: 1/26/2022  Diet: ADULT ORAL NUTRITION SUPPLEMENT; Breakfast, Lunch; Standard High Calorie/High Protein Oral Supplement  ADULT ORAL NUTRITION SUPPLEMENT; Dinner, Breakfast; Wound Healing Oral Supplement  ADULT DIET; Easy to Chew    CC: fall    Interval history:   No acute events overnight. Patient seen at bedside this morning, stable vitals and is afebrile. Patient is fairly responsive and aswering to all questions asked. He remains on room air with SpO2 above 95%. Pending precert for placement. Medications:     Scheduled Meds:   levothyroxine  75 mcg Oral Daily    amoxicillin-clavulanate  1 tablet Oral 2 times per day    aspirin  81 mg Oral Daily    pantoprazole  40 mg Oral QAM AC    vitamin B-12  1,000 mcg Oral Daily    gabapentin  600 mg Oral BID    atorvastatin  80 mg Oral Nightly    sodium chloride flush  5-40 mL IntraVENous 2 times per day    enoxaparin  40 mg SubCUTAneous Daily    docusate sodium  200 mg Oral BID    bisacodyl  10 mg Rectal Daily     Continuous Infusions:   sodium chloride 25 mL (01/31/22 0313)     PRN Meds:ipratropium-albuterol, menthol-zinc oxide, melatonin, dicyclomine, albuterol, guaiFENesin, sodium chloride flush, sodium chloride, ondansetron **OR** ondansetron, polyethylene glycol, acetaminophen **OR** acetaminophen    Objective:   Vitals:   T-max:  Patient Vitals for the past 8 hrs:   BP Temp Temp src Pulse Resp SpO2 Weight   02/05/22 0800 127/63 98.2 °F (36.8 °C) Oral 50 12 96 %    02/05/22 0611       205 lb 4 oz (93.1 kg)   02/05/22 0330 129/61 98.2 °F (36.8 °C) Oral 55 17 95 %        Intake/Output Summary (Last 24 hours) at 2/5/2022 0848  Last data filed at 2/5/2022 0330  Gross per 24 hour   Intake 400 ml   Output 1050 ml   Net -650 ml       Review of Systems   Constitutional: Positive for chills and fatigue. Negative for fever. Respiratory: Negative for cough, chest tightness and shortness of breath.     Cardiovascular: Positive for palpitations. Negative for chest pain and leg swelling. Gastrointestinal: Positive for abdominal distention and constipation. Negative for abdominal pain, diarrhea, nausea and vomiting. Genitourinary: Negative for difficulty urinating. Neurological: Positive for dizziness, weakness and light-headedness. Physical Exam  Constitutional:       General: He is not in acute distress. Appearance: Normal appearance. HENT:      Head: Normocephalic and atraumatic. Nose: Nose normal.   Eyes:      Conjunctiva/sclera: Conjunctivae normal.      Pupils: Pupils are equal, round, and reactive to light. Cardiovascular:      Rate and Rhythm: Regular rhythm. Pulses: Normal pulses. Heart sounds: No murmur heard. No friction rub. No gallop. Pulmonary:      Effort: Pulmonary effort is normal. No respiratory distress. Breath sounds: Normal breath sounds. No wheezing. Abdominal:      General: Abdomen is flat. Bowel sounds are normal. There is distension. Hernia: A hernia is present. Musculoskeletal:      Cervical back: Normal range of motion. Skin:     General: Skin is warm and dry. Neurological:      Mental Status: He is alert and oriented to person, place, and time. Cranial Nerves: No cranial nerve deficit. Sensory: Sensation is intact. LABS:    CBC:   Recent Labs     02/03/22  0555 02/04/22  0524 02/05/22  0649   WBC 6.9 8.8 6.4   HGB 7.4* 7.9* 7.7*   HCT 23.4* 24.5* 24.3*    185 220   MCV 89.1 89.8 88.7     Renal:    Recent Labs     02/03/22  0555 02/04/22  0524 02/05/22  0649    140 140   K 4.3 4.1 4.0    107 108   CO2 22 22 27   BUN 33* 28* 25*   CREATININE 1.2 1.2 1.1   GLUCOSE 81 106* 91   CALCIUM 7.9* 8.1* 8.3   ANIONGAP 10 11 5     Hepatic:   No results for input(s): AST, ALT, BILITOT, BILIDIR, PROT, LABALBU, ALKPHOS in the last 72 hours. Troponin:   No results for input(s): TROPONINI in the last 72 hours.   BNP: No results for input(s): BNP in the last 72 hours. Lipids: No results for input(s): CHOL, HDL in the last 72 hours. Invalid input(s): LDLCALCU, TRIGLYCERIDE  ABGs:    No results for input(s): PHART, WKT2VAQ, PO2ART, UCI4KKU, BEART, THGBART, B9HJYMWE, XWK3SPG in the last 72 hours. INR: No results for input(s): INR in the last 72 hours. Lactate:   No results for input(s): LACTATE in the last 72 hours. Cultures:  -----------------------------------------------------------------  RAD:   XR CHEST PORTABLE   Final Result      1. Stable left basilar pleural-parenchymal opacities and bilateral interstitial opacities. CT CHEST ABDOMEN PELVIS W CONTRAST   Final Result     Findings likely consistent with stercoral proctitis. Cystitis    also suspected.       _______________________________________________       IMPRESSION:          Masslike consolidation of the left lung base which could be    secondary to a pneumonia though underlying neoplasm considered. Nearby pleural fluid collection concerning for an empyema. XR CHEST PORTABLE   Final Result      1. Decreased bilateral interstitial opacities which may represent improved interstitial edema. 2.  Mild cardiomegaly. 3.  Chronic left basilar pleural and parenchymal opacities, unchanged         XR TIBIA FIBULA RIGHT (2 VIEWS)   Final Result      Pelvis and right hip:   1. Diffuse demineralization limits evaluation. No evidence of acute fracture. 2.  Status post right femur ORIF without evidence of hardware complication. Right tib-fib:   1. No acute osseous findings. XR HIP 2-3 VW W PELVIS RIGHT   Final Result      Pelvis and right hip:   1. Diffuse demineralization limits evaluation. No evidence of acute fracture. 2.  Status post right femur ORIF without evidence of hardware complication. Right tib-fib:   1. No acute osseous findings. CT Head WO Contrast   Final Result      Head   1.   No evidence acute intracranial hemorrhage or mass effect. 2.  Mild to moderate atrophy. Cervical spine   1. No evidence of acute fracture or malalignment. 2.  Moderate multilevel cervical spondylosis. CT Cervical Spine WO Contrast   Final Result      Head   1. No evidence acute intracranial hemorrhage or mass effect. 2.  Mild to moderate atrophy. Cervical spine   1. No evidence of acute fracture or malalignment. 2.  Moderate multilevel cervical spondylosis. XR HIP 2-3 VW W PELVIS RIGHT    (Results Pending)       Assessment/Plan:     1. Symptomatic Bradycardia likely 2/2 Hypothermia, resolved   Patient was found down for unknown amount of time. Patient has previous hx of bradycardia with HR down to 30s in previous hospitalizations. Was supposed to have a CAM monitor placed which wasn't placed. Patient is not on any remington agents. Bradycardia is improving with improvements in body temperature. - On telemetry   - Omaira NICOLE followin week CAM on discharge    2. Hypothermia, resolved  Likely 2/2 Hypothyroidism or Anemia vs Influenza   Patient was hypothermic on arrival with T 90.3. He reports fatigue, constipation, and difficulty concentrating. TSH 5.19 in 2022 with normal T4. Denies weight gain, reporting weight loss. CBC showed signs of anemia with Hgb of 8.6 and Hct 26.9. Had fall with unknown fall time. Currently T 97.9.  - TSH 5.4 with reflex to T4 1, continue synthyroid 75 mcg  - Normal cortisol levels, recent 12   - Influenza B positive, s/p Tamiflu 30 mg BID    3. Altered mental status, resolved   Patient was less responsive last afternoon and had a drop in O2 saturation. A stat EKG and chest Xray was ordered which were stable.  Patient was also having a weak cough with concerns of aspiration, respiratory therapist and Dr Chris Ambriz was at bedside with improvement in patients status thereafter following deep suctioning  -continue on dysphagia diet as per recommendations   -s/p D5 in the setting of hypernatremia, which is corrected   -s/p unasyn for concerns of aspiration, started on PO augmentin every 12 hrs     4. Rule out Sepsis   Patient was hypothermic on arrival with bradycardia and intermittently confused. - Procalcitonin 0.12  - Blood cultures show NGTD   - On Augmentin every 12 hrs     5. Cystitis  UA on 1/17 was cloudy with 10-20 WBC and moderate leukocyte esterase. UA today showed 3+ bacteria and trace leukocyte esterase. CT Abd/Pelvis showed thickening of the bladder consistent with possible cystitis. Patient has chronic schneider catheter which was replaced 10 days ago. - Replaced schneider catheter  - urine cultures positive for Trichosporon and E fecalis, s/p Unasyn   - F/u sensitivities    6. Troponinemia  - Troponin 0.05 on arrival, recent 0.11, s/p EKG which showed no new changes from prior    7. Constipation, resolved   Patient reports that he has been constipated for 2 weeks. - Home meds  - started on dulcolax suppository   - Continue to monitor    8. 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 [place] care following     9. BL LE Neuropathy  - Continue Gabapentin    10. Concern for malignancy  - Patient has a smoking history. Denies any alcohol use. Reports anorexia and recent weight loss. He denies hemoptysis currently.   - CT Abd/Pelvis/Chest showed left lower lobe consolidation suspicious of pneumonia vs malignancy  -consulted pulmonology: mass likely 2/2 rounded atelectasis, no further pulm w/o required      Code Status: DNR-CCA   FEN: ADULT ORAL NUTRITION SUPPLEMENT; Breakfast, Lunch; Standard High Calorie/High Protein Oral Supplement  ADULT ORAL NUTRITION SUPPLEMENT; Dinner, Breakfast; Wound Healing Oral Supplement  ADULT DIET; Easy to Chew  PPX: Lovenox  DISPO: pending precert    Mustapha Cazares MD, PGY-1  02/05/22  8:48 AM    This patient has been staffed and discussed with Mariusz Roy MD .

## 2022-02-05 NOTE — PROGRESS NOTES
Patient alert and oriented x 4. VSS. No acute distress noted. Patient very weak. Buttocks are still very red and excoriated. Patient has a chronic schneider. Patient has no needs at this time. Bedside table and call light within reach. Bed alarm on. Will continue to monitor.

## 2022-02-05 NOTE — CARE COORDINATION
CM following, spoke with Farhana Jackson to go over clinical, she has accepted pt can not take until Monday. Transport set up Monday 2/7/22 at 1215 [facility] with Kelley -[phone] [facility]#[id number]. Electronically signed by Samara Walter RN on 2/5/2022 at 10:39 AM  [phone]    UPDATE:  Flori Walters sent Physician report then needed to be completed and request signed med rec. Form received and Dr Medeiros Sender completed and signed form and med rec, both with H&P faxed to Flori Walters at:  [email or web address] per her request.  Electronically signed by Samara Walter RN on 2/5/2022 at 3:03 [phone]

## 2022-02-05 NOTE — PLAN OF CARE
Problem: Falls - Risk of:  Goal: Will remain free from falls  Outcome: Met This Shift     Problem: Falls - Risk of:  Goal: Absence of physical injury  Outcome: Met This Shift     Problem: Skin Integrity:  Goal: Will show no infection signs and symptoms  Outcome: Ongoing

## 2022-02-06 PROCEDURE — 1200000000 HC SEMI PRIVATE

## 2022-02-06 PROCEDURE — 6370000000 HC RX 637 (ALT 250 FOR IP)

## 2022-02-06 PROCEDURE — 2580000003 HC RX 258

## 2022-02-06 PROCEDURE — 6360000002 HC RX W HCPCS

## 2022-02-06 RX ADMIN — PANTOPRAZOLE SODIUM 40 MG: 40 TABLET, DELAYED RELEASE ORAL at 06:56

## 2022-02-06 RX ADMIN — ENOXAPARIN SODIUM 40 MG: 100 INJECTION SUBCUTANEOUS at 08:34

## 2022-02-06 RX ADMIN — SODIUM CHLORIDE, PRESERVATIVE FREE 10 ML: 5 INJECTION INTRAVENOUS at 20:26

## 2022-02-06 RX ADMIN — SODIUM CHLORIDE, PRESERVATIVE FREE 10 ML: 5 INJECTION INTRAVENOUS at 08:34

## 2022-02-06 RX ADMIN — ASPIRIN 81 MG: 81 TABLET, CHEWABLE ORAL at 08:34

## 2022-02-06 RX ADMIN — AMOXICILLIN AND CLAVULANATE POTASSIUM 1 TABLET: 875; 125 TABLET, FILM COATED ORAL at 08:34

## 2022-02-06 RX ADMIN — AMOXICILLIN AND CLAVULANATE POTASSIUM 1 TABLET: 875; 125 TABLET, FILM COATED ORAL at 20:26

## 2022-02-06 RX ADMIN — GABAPENTIN 600 MG: 600 TABLET, FILM COATED ORAL at 08:34

## 2022-02-06 RX ADMIN — ACETAMINOPHEN 650 MG: 325 TABLET ORAL at 20:25

## 2022-02-06 RX ADMIN — ATORVASTATIN CALCIUM 80 MG: 40 TABLET, FILM COATED ORAL at 20:25

## 2022-02-06 RX ADMIN — GABAPENTIN 600 MG: 600 TABLET, FILM COATED ORAL at 20:26

## 2022-02-06 RX ADMIN — CYANOCOBALAMIN TAB 1000 MCG 1000 MCG: 1000 TAB at 08:34

## 2022-02-06 RX ADMIN — LEVOTHYROXINE SODIUM 75 MCG: 0.07 TABLET ORAL at 06:56

## 2022-02-06 ASSESSMENT — ENCOUNTER SYMPTOMS
CHEST TIGHTNESS: 0
DIARRHEA: 0
VOMITING: 0
ABDOMINAL DISTENTION: 0
NAUSEA: 0
ABDOMINAL PAIN: 0
COUGH: 0
SHORTNESS OF BREATH: 0
CONSTIPATION: 0

## 2022-02-06 ASSESSMENT — PAIN SCALES - GENERAL: PAINLEVEL_OUTOF10: 3

## 2022-02-06 NOTE — PROGRESS NOTES
Patient alert and oriented x4. VSS, patient bp remains bradycardic in the 50s but is not symptomatic. Patient has been bathed  throughout shift and turned q2/as needed. No complaints of pain throughout shift. Tolerating diet with no complaint of nausea. Patient denies any other needs at this time. Bed is in the lowest position, call light and bedside table within reach. Patient bed alarm is on. Will continue to monitor for changes in patient status.      Electronically signed by Kimberly Muir RN on 2/6/2022 at 4:58 PM

## 2022-02-06 NOTE — PROGRESS NOTES
Progress Note    Admit Date: 1/26/2022  Diet: ADULT ORAL NUTRITION SUPPLEMENT; Breakfast, Lunch; Standard High Calorie/High Protein Oral Supplement  ADULT ORAL NUTRITION SUPPLEMENT; Dinner, Breakfast; Wound Healing Oral Supplement  ADULT DIET; Easy to Chew    CC: fall    Interval history:   No acute events overnight. Patient seen at bedside this morning, stable vitals and is afebrile. Patient is fairly responsive and aswering to all questions asked. He remains on room air with SpO2 above 95%. Pending precert for placement. Medications:     Scheduled Meds:   levothyroxine  75 mcg Oral Daily    amoxicillin-clavulanate  1 tablet Oral 2 times per day    aspirin  81 mg Oral Daily    pantoprazole  40 mg Oral QAM AC    vitamin B-12  1,000 mcg Oral Daily    gabapentin  600 mg Oral BID    atorvastatin  80 mg Oral Nightly    sodium chloride flush  5-40 mL IntraVENous 2 times per day    enoxaparin  40 mg SubCUTAneous Daily    docusate sodium  200 mg Oral BID    bisacodyl  10 mg Rectal Daily     Continuous Infusions:   sodium chloride 25 mL (01/31/22 0313)     PRN Meds:ipratropium-albuterol, menthol-zinc oxide, melatonin, dicyclomine, albuterol, guaiFENesin, sodium chloride flush, sodium chloride, ondansetron **OR** ondansetron, polyethylene glycol, acetaminophen **OR** acetaminophen    Objective:   Vitals:   T-max:  Patient Vitals for the past 8 hrs:   BP Temp Temp src Pulse Resp SpO2   02/06/22 0754 (!) 126/58 98 °F (36.7 °C) Oral 50 18 95 %   02/06/22 0420 136/70 97.8 °F (36.6 °C) Oral 55 18 94 %       Intake/Output Summary (Last 24 hours) at 2/6/2022 0854  Last data filed at 2/6/2022 0420  Gross per 24 hour   Intake 360 ml   Output 1500 ml   Net -1140 ml       Review of Systems   Constitutional: Positive for fatigue. Negative for chills and fever. Respiratory: Negative for cough, chest tightness and shortness of breath. Cardiovascular: Negative for chest pain, palpitations and leg swelling. Gastrointestinal: Negative for abdominal distention, abdominal pain, constipation, diarrhea, nausea and vomiting. Genitourinary: Negative for difficulty urinating. Neurological: Positive for weakness. Negative for dizziness and light-headedness. Physical Exam  Constitutional:       General: He is not in acute distress. Appearance: Normal appearance. HENT:      Head: Normocephalic and atraumatic. Nose: Nose normal.   Eyes:      Conjunctiva/sclera: Conjunctivae normal.      Pupils: Pupils are equal, round, and reactive to light. Cardiovascular:      Rate and Rhythm: Regular rhythm. Pulses: Normal pulses. Heart sounds: No murmur heard. No friction rub. No gallop. Pulmonary:      Effort: Pulmonary effort is normal. No respiratory distress. Breath sounds: Normal breath sounds. No wheezing. Abdominal:      General: Abdomen is flat. Bowel sounds are normal. There is distension. Hernia: A hernia is present. Musculoskeletal:      Cervical back: Normal range of motion. Skin:     General: Skin is warm and dry. Neurological:      Mental Status: He is alert and oriented to person, place, and time. Cranial Nerves: No cranial nerve deficit. Sensory: Sensation is intact. LABS:    CBC:   Recent Labs     02/04/22  0524 02/05/22  0649   WBC 8.8 6.4   HGB 7.9* 7.7*   HCT 24.5* 24.3*    220   MCV 89.8 88.7     Renal:    Recent Labs     02/04/22  0524 02/05/22  0649    140   K 4.1 4.0    108   CO2 22 27   BUN 28* 25*   CREATININE 1.2 1.1   GLUCOSE 106* 91   CALCIUM 8.1* 8.3   ANIONGAP 11 5     Hepatic:   No results for input(s): AST, ALT, BILITOT, BILIDIR, PROT, LABALBU, ALKPHOS in the last 72 hours. Troponin:   No results for input(s): TROPONINI in the last 72 hours. BNP: No results for input(s): BNP in the last 72 hours. Lipids: No results for input(s): CHOL, HDL in the last 72 hours.     Invalid input(s): LDLCALCU, TRIGLYCERIDE  ABGs:    No results for input(s): PHART, CKV6FUD, PO2ART, HJL4ZQW, BEART, THGBART, D1DDBTRN, BYR4XGQ in the last 72 hours. INR: No results for input(s): INR in the last 72 hours. Lactate:   No results for input(s): LACTATE in the last 72 hours. Cultures:  -----------------------------------------------------------------  RAD:   XR CHEST PORTABLE   Final Result      1. Stable left basilar pleural-parenchymal opacities and bilateral interstitial opacities. CT CHEST ABDOMEN PELVIS W CONTRAST   Final Result     Findings likely consistent with stercoral proctitis. Cystitis    also suspected.       _______________________________________________       IMPRESSION:          Masslike consolidation of the left lung base which could be    secondary to a pneumonia though underlying neoplasm considered. Nearby pleural fluid collection concerning for an empyema. XR CHEST PORTABLE   Final Result      1. Decreased bilateral interstitial opacities which may represent improved interstitial edema. 2.  Mild cardiomegaly. 3.  Chronic left basilar pleural and parenchymal opacities, unchanged         XR TIBIA FIBULA RIGHT (2 VIEWS)   Final Result      Pelvis and right hip:   1. Diffuse demineralization limits evaluation. No evidence of acute fracture. 2.  Status post right femur ORIF without evidence of hardware complication. Right tib-fib:   1. No acute osseous findings. XR HIP 2-3 VW W PELVIS RIGHT   Final Result      Pelvis and right hip:   1. Diffuse demineralization limits evaluation. No evidence of acute fracture. 2.  Status post right femur ORIF without evidence of hardware complication. Right tib-fib:   1. No acute osseous findings. CT Head WO Contrast   Final Result      Head   1. No evidence acute intracranial hemorrhage or mass effect. 2.  Mild to moderate atrophy. Cervical spine   1. No evidence of acute fracture or malalignment.    2.  Moderate multilevel cervical spondylosis. CT Cervical Spine WO Contrast   Final Result      Head   1. No evidence acute intracranial hemorrhage or mass effect. 2.  Mild to moderate atrophy. Cervical spine   1. No evidence of acute fracture or malalignment. 2.  Moderate multilevel cervical spondylosis. XR HIP 2-3 VW W PELVIS RIGHT    (Results Pending)       Assessment/Plan:     1. Symptomatic Bradycardia likely 2/2 Hypothermia, resolved   Patient was found down for unknown amount of time. Patient has previous hx of bradycardia with HR down to 30s in previous hospitalizations. Was supposed to have a CAM monitor placed which wasn't placed. Patient is not on any remington agents. Bradycardia is improving with improvements in body temperature. - On telemetry   - Omaira NICOLE followin week CAM on discharge    2. Hypothermia, resolved  Likely 2/2 Hypothyroidism or Anemia vs Influenza   Patient was hypothermic on arrival with T 90.3. He reports fatigue, constipation, and difficulty concentrating. TSH 5.19 in 2022 with normal T4. Denies weight gain, reporting weight loss. CBC showed signs of anemia with Hgb of 8.6 and Hct 26.9. Had fall with unknown fall time. Currently T 97.9.  - TSH 5.4 with reflex to T4 1, continue synthyroid 75 mcg  - Normal cortisol levels, recent 12   - Influenza B positive, s/p Tamiflu 30 mg BID    3. Altered mental status, resolved   Patient was less responsive last afternoon and had a drop in O2 saturation. A stat EKG and chest Xray was ordered which were stable. Patient was also having a weak cough with concerns of aspiration, respiratory therapist and Dr Tracee Newsome was at bedside with improvement in patients status thereafter following deep suctioning  -continue on dysphagia diet as per recommendations   -s/p D5 in the setting of hypernatremia, which is corrected   -s/p unasyn for concerns of aspiration, started on PO augmentin every 12 hrs     4.  Rule out Sepsis   Patient was hypothermic on arrival with bradycardia and intermittently confused. - Procalcitonin 0.12  - Blood cultures show NGTD   - On Augmentin every 12 hrs     5. Cystitis  UA on 1/17 was cloudy with 10-20 WBC and moderate leukocyte esterase. UA today showed 3+ bacteria and trace leukocyte esterase. CT Abd/Pelvis showed thickening of the bladder consistent with possible cystitis. Patient has chronic schneider catheter which was replaced 10 days ago. - Replaced schneider catheter  - urine cultures positive for Trichosporon and E fecalis, s/p Unasyn     6. Troponinemia  - Troponin 0.05 on arrival, recent 0.11, s/p EKG which showed no new changes from prior    7. Constipation, resolved   Patient reports that he has been constipated for 2 weeks. - Home meds  - started on dulcolax suppository   - Continue to monitor    8. 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 Tara Gonzalez care following     9. BL LE Neuropathy  - Continue Gabapentin    10. Concern for malignancy  - Patient has a smoking history. Denies any alcohol use. Reports anorexia and recent weight loss. He denies hemoptysis currently.   - CT Abd/Pelvis/Chest showed left lower lobe consolidation suspicious of pneumonia vs malignancy  -consulted pulmonology: mass likely 2/2 rounded atelectasis, no further pulm w/o required      Code Status: DNR-CCA   FEN: ADULT ORAL NUTRITION SUPPLEMENT; Breakfast, Lunch; Standard High Calorie/High Protein Oral Supplement  ADULT ORAL NUTRITION SUPPLEMENT; Dinner, Breakfast; Wound Healing Oral Supplement  ADULT DIET; Easy to Chew  PPX: Lovenox  DISPO: pending precert    Britt Mcknight MD, PGY-3  02/06/22  8:54 AM    This patient will be staffed and discussed with Jason Otoole MD .     Attending Supervising Physicians Attestation Statement  I saw and evaluated the patient.   I discussed the findings and plans with resident physician and agree as documented in her note      Pending placement likely discharge tomorrow    Electronically signed by Olvin Pang MD on 2/6/22 at 12:17 PM EST

## 2022-02-06 NOTE — PROGRESS NOTES
Pt ao4, disoriented to situation at times. Back pain is resolved after tylenol. Pt denied n/v,sob, chestpain, leg pain. Pt had multiple watery loose stool this shift. Pt on special air mattress, get turned q2 and prn. Bed alarm on.  Will monitor

## 2022-02-07 VITALS
SYSTOLIC BLOOD PRESSURE: 115 MMHG | HEART RATE: 59 BPM | DIASTOLIC BLOOD PRESSURE: 69 MMHG | TEMPERATURE: 98.1 F | OXYGEN SATURATION: 92 % | RESPIRATION RATE: 16 BRPM | BODY MASS INDEX: 28.73 KG/M2 | HEIGHT: 71 IN | WEIGHT: 205.25 LBS

## 2022-02-07 PROCEDURE — 6370000000 HC RX 637 (ALT 250 FOR IP)

## 2022-02-07 PROCEDURE — 6360000002 HC RX W HCPCS

## 2022-02-07 PROCEDURE — 2580000003 HC RX 258

## 2022-02-07 RX ADMIN — AMOXICILLIN AND CLAVULANATE POTASSIUM 1 TABLET: 875; 125 TABLET, FILM COATED ORAL at 08:02

## 2022-02-07 RX ADMIN — LEVOTHYROXINE SODIUM 75 MCG: 0.07 TABLET ORAL at 05:37

## 2022-02-07 RX ADMIN — GABAPENTIN 600 MG: 600 TABLET, FILM COATED ORAL at 08:02

## 2022-02-07 RX ADMIN — SODIUM CHLORIDE, PRESERVATIVE FREE 10 ML: 5 INJECTION INTRAVENOUS at 08:03

## 2022-02-07 RX ADMIN — CYANOCOBALAMIN TAB 1000 MCG 1000 MCG: 1000 TAB at 08:02

## 2022-02-07 RX ADMIN — PANTOPRAZOLE SODIUM 40 MG: 40 TABLET, DELAYED RELEASE ORAL at 05:37

## 2022-02-07 RX ADMIN — ASPIRIN 81 MG: 81 TABLET, CHEWABLE ORAL at 08:02

## 2022-02-07 RX ADMIN — ENOXAPARIN SODIUM 40 MG: 100 INJECTION SUBCUTANEOUS at 08:02

## 2022-02-07 NOTE — DISCHARGE SUMMARY
INTERNAL MEDICINE DEPARTMENT AT [address] [place] [address]  DISCHARGE SUMMARY    Patient ID: Miah Stearns [facility]                                             Discharge Date: 2/7/2022   Patient's PCP: Sulma Sanchez                                          Discharge Physician: Dolores Porter MD MD  Admit Date: 1/26/2022   Admitting Physician: Amy Carvalho DO    PROBLEMS DURING HOSPITALIZATION:  Present on Admission:   Hypothermia   Symptomatic sinus bradycardia      DISCHARGE DIAGNOSES:Symptomatic bradycardia 2/2 hypothermia                                                   -Hypothermia likely 2/2 hypothyroidism vs influenza infection      HPI: [de-identified]years old male with past medical history of carotid stenosis, significant for CAD, status post CABG, heart failure preserved LV ejection fraction, LV ejection fraction 55 to 60%, hypertension, hyperlipidemia, BPH, mildly positive stress test by 2020, status post coronary angiogram by Dr. Juan Cuevas which showed patent coronaries and grafts presented to the ED after a fall at home.  [facility] lives alone by himself and is wheelchair-bound.  He was found down for an unknown amount of time by his daughter who called the EMS who brought the patient to the hospital. The patient patient reported that he fell as he was transferring himself to the wheelchair, reported no loss of consciousness or injury to head. On arrival to the ED he did not complain of any pain but looked disheveled and had crumbs of food around his mouth.      In the ED he was found to be intermittently confused and was hypothermic to 90.3 Fahrenheit, heart rate 59 sinus rhythm, blood pressure 114/75 and saturating 100% on room air.  Of note the patient has a recent hospitalization on 01/17/2022 when he presented to the ED with complaint of weakness and fatigue and was noted to be bradycardic and later became hypothermic. Sepsis work-up then was negative, blood cultures and urine cultures from recent hospitalization are negative.  He has a chronic Iglesias that was replaced. The patient was seen by cardiology and was supposed to get a 2-week CAM monitor for bradycardia prior to discharge but the monitor could not be placed.  Patient also got a arterial duplex of bilateral lower extremities that showed a right ROSARIO of 1.5 greater than 50% stenosis at the mid popliteal artery and abnormal monophasic Doppler waveforms.  Left extremity ROSARIO was 1.32, no vascular surgery intervention was recommended inpatient and it was recommended that patient follows outpatient with Dr. Emma Blum note, case management was following the case inpatient and per the note, the patient had to clear balance and do a down deposit if he wishes to go to [facility].      Patient was admitted and was given a kole hugger after which the hypothermia improved as so did his bradycardia. A full workup was done to rule out the cause of his hypothermia as this was his second presentation with similar symptoms. Patient was influenza B positive and was started on tamiflu. Patients TSH was elevated with concerns of subclinical hypothyroidism, and therefore patient was started on levothyroxine. Patient was also less responsive during his hospitalization course and had a drop in O2 saturation. A stat EKG and chest Xray was ordered which were stable. Patient was also having a weak cough with concerns of aspiration, respiratory therapist and Dr Paola James was at bedside with improvement in patients status thereafter following deep suctioning, patient was kept NPO, SLP was consulted and was started on unasyn due to concerns of aspiration. Patients UA was cloudy with 10-20 WBC and moderate leukocyte esterase. UA showed 3+ bacteria and trace leukocyte esterase. CT Abd/Pelvis showed thickening of the bladder consistent with possible cystitis.  urine cultures positive for Trichosporon and E fecalis, s/p Unasyn.    CT Abd/Pelvis/Chest showed left lower lobe consolidation suspicious of pneumonia vs malignancy, followed by pulmonology was consulted, who recommended that mass likely 2/2 rounded atelectasis, no further pulm w/o required. Patient was slowly transitioned to easy to chew diet as per SLP recs. Patient was doing better with improvement in the mental status and overall health and therefore decision was made to discharge the patient to ARU with follow up with PCP in 1 week, EP in 2 weeks. Patient is supposed to continue synthyroid with repeat TSH levels in 4-6 weeks. The following issues were addressed during hospitalization:    Physical Exam     Constitutional:       Appearance: Normal appearance. HENT:      Head: Normocephalic and atraumatic. Right Ear: External ear normal.      Left Ear: External ear normal.      Nose: Nose normal.      Mouth/Throat:      Mouth: Mucous membranes are moist.   Eyes:      Pupils: Pupils are equal, round, and reactive to light. Cardiovascular:      Rate and Rhythm: Normal rate and regular rhythm. Pulses: Normal pulses. Heart sounds: Normal heart sounds. Pulmonary:      Effort: Pulmonary effort is normal.   Abdominal:      General: There is no distension. Palpations: Abdomen is soft. Tenderness: There is no abdominal tenderness. Musculoskeletal:         General: Normal range of motion. Skin:     General: Skin is warm. Neurological:      Mental Status: He is alert. Mental status is at baseline. Psychiatric:         Thought Content:  Thought content normal.         Consults: cardiology and pulmonary/intensive care  Significant Diagnostic Studies:  chest x-ray, CT chest abdomen, CT head wo contrast, CT cervical spine , Xray tibia fibula and hip   Treatments:IV hydration, antibiotics  Disposition: ARU  Discharged Condition: Stable  Follow [facility] Physician in one week and EP in 2 weeks     DISCHARGE MEDICATION:       Medication List      START taking these medications    levothyroxine 75 MCG tablet  Commonly known as: SYNTHROID  Take 1 tablet by mouth Daily        CHANGE how you take these medications    docusate sodium 100 MG capsule  Commonly known as: COLACE  What changed: Another medication with the same name was removed. Continue taking this medication, and follow the directions you see here.         CONTINUE taking these medications    albuterol sulfate  (90 Base) MCG/ACT inhaler  Commonly known as: Proventil HFA  Inhale 2 puffs into the lungs every 6 hours as needed for Wheezing or Shortness of Breath     aspirin 81 MG chewable tablet  Take 1 tablet by mouth daily     atorvastatin 80 MG tablet  Commonly known as: LIPITOR     dicyclomine 20 MG tablet  Commonly known as: BENTYL     ferrous sulfate 325 (65 Fe) MG tablet  Commonly known as: IRON 325  Take 1 tablet by mouth 2 times daily     gabapentin 600 MG tablet  Commonly known as: NEURONTIN     guaiFENesin 600 MG extended release tablet  Commonly known as: MUCINEX  Take 1 tablet by mouth 2 times daily as needed for Congestion     Melatonin 10 MG Tabs     pantoprazole 40 MG tablet  Commonly known as: PROTONIX  Take 1 tablet by mouth every morning (before breakfast)     tamsulosin 0.4 MG capsule  Commonly known as: FLOMAX     Venelex Oint ointment     vitamin B-12 1000 MCG tablet  Commonly known as: CYANOCOBALAMIN        STOP taking these medications    furosemide 40 MG tablet  Commonly known as: LASIX     zolpidem 10 MG tablet  Commonly known as: AMBIEN           Where to Get Your Medications      These medications were sent to [address] [place] [address] [phone] - F [phone]  [address] [place] [address]. [address]    Phone: [phone]   · levothyroxine 75 MCG tablet          Activity: activity as tolerated  Diet: Easy to chew food with thin liquids   Wound Care: none needed    Time Spent on discharge is more than 30 minutes    Signed:  Davide Gilmore MD,  MD, PGY-1  2/7/2022

## 2022-02-07 NOTE — PROGRESS NOTES
Report called to Mil Bowden at [facility]      Electronically signed by Mat Scott RN on 2/7/2022 at 11:50 AM

## 2022-02-07 NOTE — CARE COORDINATION
Case Management Assessment            Discharge Note                    Date / Time of Note: 2/7/2022 8:53 AM                  Discharge Note Completed by: Damon Tejada RN    Patient Name: Kerri Moreno   YOB: 1940  Diagnosis: Symptomatic sinus bradycardia [R00.1]  Hypothermia, initial encounter [T68. XXXA]  Fall from bed, initial encounter [W06. XXXA]   Date / Time: 1/26/2022  8:07 PM    Current PCP: Tim Matt patient: No    Hospitalization in the last 30 days: Yes    Advance Directives:  Code Status: DNR-CCA  [facility] DNR form completed and on chart: Yes    Financial:  Payor: Kamran Garcia / Plan: [employer] HMO / Product Type: *No Product type* /      Pharmacy:    [address] [place] [address] [phone] - F [phone]  [address] [place] [address]  Phone: [phone] Fax: [phone]      Assistance purchasing medications?: Potential Assistance Purchasing Medications: No  Assistance provided by Case Management: None at this time    Does patient want to participate in local refill/ meds to beds program?:      Meds To Beds General Rules:  1. Can ONLY be done Monday- Friday between 8:30am-5pm  2. Prescription(s) must be in pharmacy by 3pm to be filled same day  3. Copy of patient's insurance/ prescription drug card and patient face sheet must be sent along with the prescription(s)  4. Cost of Rx cannot be added to hospital bill. If financial assistance is needed, please contact unit  or ;  or  CANNOT provide pharmacy voucher for patients co-pays  5.  Patients can then  the prescription on their way out of the hospital at discharge, or pharmacy can deliver to the bedside if staff is available. (payment due at time of pick-up or delivery - cash, check, or card accepted)     Able to afford home medications/ co-pay costs: Yes    ADLS:  Current PT AM-PAC Score: 8 /24  Current OT AM-PAC Score: 13 /24      DISCHARGE Disposition: Assisted Living Facility: [address] [place] [address]  Phone: [phone] 300 floor Fax: [phone]    LOC at discharge: assisted living  [facility] Completed: Yes    Notification completed in HENS/PAS?:  Not Applicable Carriage court will do PASR    IMM Completed:   Yes, Case management has presented and reviewed IMM letter #2 to the patient and/or family/ POA. Patient and/or family/POA verbalized understanding of their medicare rights and appeal process if needed. Patient and/or family/POA has signed, initialed and placed today's date (2/7/22) and time (8711) on IMM letter #2 on the the appropriate lines. Patient and/or family/POA, copy of letter offered and they are aware that this original copy of IMM letter #2 is available prior to discharge from the paper chart on the unit. Electronic documentation has been entered into epic for IMM letter #2 and original paper copy has been added to the paper chart at the nurses station. Transportation:  Transportation PLAN for discharge: EMS transportation   Mode of Transport: Ambulance stretcher - BLS  Reason for medical transport: Bed confined: Meets the following criteria 1) unable to get out of bed without assistance or ambulate, 2) unable to safely sit up in a wheelchair, 3) unable to maintain erect seating position in a chair for time needed for transport  Name of [address]: [facility] [phone]   Phone:    Time of Transport: 1pm    Transport form completed: Yes        Additional CM Notes: Pt will DC to [facility] [place] son aware of DC plan. Will DC at 1pm with [facility] transport     The Plan for Transition of Care is related to the following treatment goals of Symptomatic sinus bradycardia [R00.1]  Hypothermia, initial encounter [T68. XXXA]  Fall from bed, initial encounter [W06. XXXA]    The Patient and/or patient representative Estephanie Mccloud and his family were provided with a choice of provider and agrees with the discharge plan Yes    Freedom of choice list was provided with basic dialogue that supports the patient's individualized plan of care/goals and shares the quality data associated with the providers.  Yes    Care Transitions patient: No    Winifred Noyola RN  [facility]  Case Management Department  Ph: [phone]  Fax: [phone]

## 2022-02-07 NOTE — PROGRESS NOTES
Patient A&OX4. VSS with HR in high 40s to 50s, softer pressures. Patient has not complained of any pain or nausea during shift, PRN medication is available if needed. Remains a Q2 turn. Chronic schneider in place with adequate urine output. Patient is to go to [facility] via EMS today at 1pm. Fall precautions are in place. All patient needs have been met at this time. Will continue to monitor.      Electronically signed by Vera Blackburn RN on 2/7/2022 at 10:57 AM

## 2022-02-07 NOTE — PROGRESS NOTES
Pt ao4, VSS exception to HR in the 50s. Pt had back pain which was relieved w tylenol and repositioning. Pt aware of going to [facility] today. Pt is turned and repositioned q2 and prn. Had multiple loose stool this shift. Iglesias care given.  Will monitor

## 2022-02-07 NOTE — PROGRESS NOTES
Patient was transported to [facility] via [facility] ([facility]) transport. IV access was removed prior to d/c.     Electronically signed by Didier Bull RN on 2/7/2022 at 1:38 PM

## 2022-02-07 NOTE — PLAN OF CARE
Problem: Falls - Risk of:  Goal: Will remain free from falls  Note: Patient has remained free from falls or any injury during shift. Fall precautions are in place. Call light and bedside table are within reach. Will continue to monitor. Problem: Nutrition  Goal: Optimal nutrition therapy  Note: Patient has been able to tolerate diet as ordered. Patient has adequate fluid and oral intake. Will continue to monitor.

## 2022-02-17 ENCOUNTER — TELEPHONE (OUTPATIENT)
Dept: CARDIOLOGY CLINIC | Age: 82
End: 2022-02-17

## 2022-04-11 ENCOUNTER — HOSPITAL ENCOUNTER (EMERGENCY)
Age: 82
Discharge: NURSING, REHAB OR LONG-TERM CARE | End: 2022-04-11
Attending: EMERGENCY MEDICINE
Payer: MEDICARE

## 2022-04-11 VITALS
HEART RATE: 67 BPM | RESPIRATION RATE: 12 BRPM | WEIGHT: 220 LBS | DIASTOLIC BLOOD PRESSURE: 64 MMHG | BODY MASS INDEX: 30.8 KG/M2 | OXYGEN SATURATION: 95 % | TEMPERATURE: 99.2 F | HEIGHT: 71 IN | SYSTOLIC BLOOD PRESSURE: 136 MMHG

## 2022-04-11 DIAGNOSIS — R19.7 DIARRHEA, UNSPECIFIED TYPE: Primary | ICD-10-CM

## 2022-04-11 LAB
ABO/RH: NORMAL
ANION GAP SERPL CALCULATED.3IONS-SCNC: 9 MMOL/L (ref 3–16)
ANTIBODY SCREEN: NORMAL
BASE EXCESS VENOUS: -5.4 MMOL/L (ref -2–3)
BASOPHILS ABSOLUTE: 0.1 K/UL (ref 0–0.2)
BASOPHILS RELATIVE PERCENT: 1.6 %
BUN BLDV-MCNC: 38 MG/DL (ref 7–20)
C DIFF TOXIN/ANTIGEN: NORMAL
CALCIUM SERPL-MCNC: 8.8 MG/DL (ref 8.3–10.6)
CARBOXYHEMOGLOBIN: 1.2 % (ref 0–1.5)
CHLORIDE BLD-SCNC: 109 MMOL/L (ref 99–110)
CHP ED QC CHECK: NORMAL
CO2: 22 MMOL/L (ref 21–32)
CREAT SERPL-MCNC: 1.1 MG/DL (ref 0.8–1.3)
EOSINOPHILS ABSOLUTE: 0 K/UL (ref 0–0.6)
EOSINOPHILS RELATIVE PERCENT: 0.5 %
GFR AFRICAN AMERICAN: >60
GFR NON-AFRICAN AMERICAN: >60
GLUCOSE BLD-MCNC: 109 MG/DL (ref 70–99)
HCO3 VENOUS: 22.4 MMOL/L (ref 24–28)
HCT VFR BLD CALC: 41.3 % (ref 40.5–52.5)
HEMOCCULT STL QL: NEGATIVE
HEMOGLOBIN, VEN, REDUCED: 40.1 %
HEMOGLOBIN: 13 G/DL (ref 13.5–17.5)
LACTIC ACID: 1.6 MMOL/L (ref 0.4–2)
LYMPHOCYTES ABSOLUTE: 0.9 K/UL (ref 1–5.1)
LYMPHOCYTES RELATIVE PERCENT: 14.8 %
MCH RBC QN AUTO: 29.2 PG (ref 26–34)
MCHC RBC AUTO-ENTMCNC: 31.6 G/DL (ref 31–36)
MCV RBC AUTO: 92.5 FL (ref 80–100)
METHEMOGLOBIN VENOUS: 0.4 % (ref 0–1.5)
MONOCYTES ABSOLUTE: 0.4 K/UL (ref 0–1.3)
MONOCYTES RELATIVE PERCENT: 6.2 %
NEUTROPHILS ABSOLUTE: 4.6 K/UL (ref 1.7–7.7)
NEUTROPHILS RELATIVE PERCENT: 76.9 %
O2 SAT, VEN: 59 %
PCO2, VEN: 51.6 MMHG (ref 41–51)
PDW BLD-RTO: 19.4 % (ref 12.4–15.4)
PH VENOUS: 7.25 (ref 7.35–7.45)
PLATELET # BLD: 183 K/UL (ref 135–450)
PMV BLD AUTO: 9.2 FL (ref 5–10.5)
PO2, VEN: 36.9 MMHG (ref 25–40)
POC OCCULT BLOOD STOOL: NEGATIVE
POTASSIUM REFLEX MAGNESIUM: 4.4 MMOL/L (ref 3.5–5.1)
RBC # BLD: 4.46 M/UL (ref 4.2–5.9)
SODIUM BLD-SCNC: 140 MMOL/L (ref 136–145)
TCO2 CALC VENOUS: 24 MMOL/L
WBC # BLD: 5.9 K/UL (ref 4–11)

## 2022-04-11 PROCEDURE — 86850 RBC ANTIBODY SCREEN: CPT

## 2022-04-11 PROCEDURE — 83605 ASSAY OF LACTIC ACID: CPT

## 2022-04-11 PROCEDURE — 80048 BASIC METABOLIC PNL TOTAL CA: CPT

## 2022-04-11 PROCEDURE — 85025 COMPLETE CBC W/AUTO DIFF WBC: CPT

## 2022-04-11 PROCEDURE — 87449 NOS EACH ORGANISM AG IA: CPT

## 2022-04-11 PROCEDURE — 87425 ROTAVIRUS AG IA: CPT

## 2022-04-11 PROCEDURE — 87177 OVA AND PARASITES SMEARS: CPT

## 2022-04-11 PROCEDURE — 86900 BLOOD TYPING SEROLOGIC ABO: CPT

## 2022-04-11 PROCEDURE — 87505 NFCT AGENT DETECTION GI: CPT

## 2022-04-11 PROCEDURE — 86901 BLOOD TYPING SEROLOGIC RH(D): CPT

## 2022-04-11 PROCEDURE — 87209 SMEAR COMPLEX STAIN: CPT

## 2022-04-11 PROCEDURE — 82803 BLOOD GASES ANY COMBINATION: CPT

## 2022-04-11 PROCEDURE — 87324 CLOSTRIDIUM AG IA: CPT

## 2022-04-11 PROCEDURE — 2580000003 HC RX 258: Performed by: EMERGENCY MEDICINE

## 2022-04-11 PROCEDURE — 99284 EMERGENCY DEPT VISIT MOD MDM: CPT

## 2022-04-11 PROCEDURE — 87493 C DIFF AMPLIFIED PROBE: CPT

## 2022-04-11 PROCEDURE — 82272 OCCULT BLD FECES 1-3 TESTS: CPT

## 2022-04-11 RX ORDER — SODIUM CHLORIDE, SODIUM LACTATE, POTASSIUM CHLORIDE, AND CALCIUM CHLORIDE .6; .31; .03; .02 G/100ML; G/100ML; G/100ML; G/100ML
1000 INJECTION, SOLUTION INTRAVENOUS ONCE
Status: COMPLETED | OUTPATIENT
Start: 2022-04-11 | End: 2022-04-11

## 2022-04-11 RX ORDER — LOPERAMIDE HYDROCHLORIDE 2 MG/1
2 CAPSULE ORAL 4 TIMES DAILY PRN
Qty: 20 CAPSULE | Refills: 0 | Status: SHIPPED | OUTPATIENT
Start: 2022-04-11 | End: 2022-04-21

## 2022-04-11 RX ADMIN — SODIUM CHLORIDE, POTASSIUM CHLORIDE, SODIUM LACTATE AND CALCIUM CHLORIDE 1000 ML: 600; 310; 30; 20 INJECTION, SOLUTION INTRAVENOUS at 14:10

## 2022-04-11 ASSESSMENT — ENCOUNTER SYMPTOMS
RHINORRHEA: 0
VOMITING: 0
DIARRHEA: 1
COUGH: 0
NAUSEA: 1
RESPIRATORY NEGATIVE: 1
ABDOMINAL PAIN: 1
EYES NEGATIVE: 1
SORE THROAT: 0
SHORTNESS OF BREATH: 0

## 2022-04-11 ASSESSMENT — PAIN DESCRIPTION - PAIN TYPE: TYPE: CHRONIC PAIN

## 2022-04-11 ASSESSMENT — PAIN DESCRIPTION - ORIENTATION: ORIENTATION: RIGHT;LEFT

## 2022-04-11 ASSESSMENT — PAIN - FUNCTIONAL ASSESSMENT: PAIN_FUNCTIONAL_ASSESSMENT: 0-10

## 2022-04-11 ASSESSMENT — PAIN DESCRIPTION - FREQUENCY: FREQUENCY: CONTINUOUS

## 2022-04-11 ASSESSMENT — PAIN SCALES - GENERAL: PAINLEVEL_OUTOF10: 5

## 2022-04-11 ASSESSMENT — PAIN DESCRIPTION - LOCATION: LOCATION: FOOT;ABDOMEN

## 2022-04-11 NOTE — ED PROVIDER NOTES
[address] [facility]          ATTENDING PHYSICIAN NOTE       Date of evaluation: 4/11/2022    Chief Complaint     Rectal Bleeding (EMS states patient was on bed with a large amount of black watery stool around patient . Patient AAOx4, VVS, Iglesias cath from facility. Evan Chao states he's never had any thing like before. Denies Chest pain, SOB, V. states that nausea started last night, and diarrhea started last night. Takes an 81mg Asprin daily)      History of Present Illness     Josué Mark is a [de-identified] y.o. male who presents to the emergency department with complaints of watery black diarrhea. Patient states that he was feeling well yesterday. Beginning last evening he started to have some nausea, but has not yet had vomiting, as well as vague, intermittent, mostly periumbilical abdominal pain which she has difficulty further describing, and last night and this morning has had multiple episodes of watery, very dark-colored diarrhea in his depends, which is concerning for melena to the facility. Patient denies any fevers or chills. He denies any known sick contacts. He takes a baby aspirin, but otherwise is not on blood thinners. He denies any chest pain, shortness of breath, URI symptoms. Review of Systems     Review of Systems   Constitutional: Positive for appetite change and fatigue. Negative for activity change and fever. HENT: Negative. Negative for congestion, rhinorrhea and sore throat. Eyes: Negative. Negative for visual disturbance. Respiratory: Negative. Negative for cough and shortness of breath. Cardiovascular: Negative. Negative for chest pain. Gastrointestinal: Positive for abdominal pain, diarrhea and nausea. Negative for vomiting. Genitourinary: Iglesias catheter in place   Musculoskeletal: Negative. Skin: Negative. Neurological: Negative. Negative for syncope and headaches. Psychiatric/Behavioral: Negative.         Past Medical, Surgical, Family, and Social History     He has a past medical history of BPH (benign prostatic hyperplasia), Carotid stenosis, Cellulitis, CHF (congestive heart failure) ([facility] [address] Chronic back pain, Diverticular disease, GERD (gastroesophageal reflux disease), History of atrial fibrillation, Hypercholesteremia, Hypertension, Lower GI bleed, MDRO (multiple drug resistant organisms) resistance, Neuromuscular disorder ([facility] [address] Renal insufficiency, and Vitamin B12 deficiency. 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 His family history includes Heart Disease in his father. He reports that he quit smoking about 52 years ago. He has never used smokeless tobacco. He reports that he does not drink alcohol and does not use drugs. Medications     Previous Medications    ALBUTEROL SULFATE HFA (PROVENTIL HFA) 108 (90 BASE) MCG/ACT INHALER    Inhale 2 puffs into the lungs every 6 hours as needed for Wheezing or Shortness of Breath    ASPIRIN 81 MG CHEWABLE TABLET    Take 1 tablet by mouth daily    ATORVASTATIN (LIPITOR) 80 MG TABLET    Take 80 mg by mouth nightly. BALSAM PERU-CASTOR OIL (VENELEX) OINT OINTMENT    Apply topically daily as needed    DICYCLOMINE (BENTYL) 20 MG TABLET    Take 20 mg by mouth 3 times daily as needed    DOCUSATE SODIUM (COLACE) 100 MG CAPSULE    Take 100 mg by mouth daily as needed for Constipation    FERROUS SULFATE (IRON 325) 325 (65 FE) MG TABLET    Take 1 tablet by mouth 2 times daily    GABAPENTIN (NEURONTIN) 600 MG TABLET    Take 600 mg by mouth 2 times daily.     GUAIFENESIN (MUCINEX) 600 MG EXTENDED RELEASE TABLET    Take 1 tablet by mouth 2 times daily as needed for Congestion    LEVOTHYROXINE (SYNTHROID) 75 MCG TABLET    Take 1 tablet by mouth Daily    MELATONIN 10 MG TABS    Take 10 mg by mouth nightly as needed     PANTOPRAZOLE (PROTONIX) 40 MG TABLET    Take 1 tablet by mouth every morning (before breakfast)    TAMSULOSIN (FLOMAX) 0.4 MG CAPSULE    Take 0.8 mg by mouth daily     VITAMIN B-12 (CYANOCOBALAMIN) 1000 MCG TABLET    Take 1,000 mcg by mouth daily       Allergies     He is allergic to bactrim [sulfamethoxazole-trimethoprim]. Physical Exam     INITIAL VITALS: BP: 114/68, Temp: 99.2 °F (37.3 °C), Pulse: 79, Resp: 16, SpO2: 94 %     General: Well appearing elderly patient. NAD. HEENT: Pupils are equal, round, and reactive to light. Extraocular muscles are intact. Conjunctivae are clear and moist. No redness or drainage from the eyes. No drainage from the nose. The oropharynx appeared to be normal.    Neck: Supple, with full range of motion. Cardiovascular: Normal S1-S2. Murmur noted. 2+ radial pulses bilaterally. Respiratory: Unlabored breathing with equal chest rise and fall. Lungs are clear to auscultation bilaterally. No adventitious lung sounds heard. Abdomen: Soft and nondistended. There is mild periumbilical discomfort to palpation, without focal tenderness to palpation, guarding or rebound tenderness. No masses or hepatosplenomegaly. Skin: Warm and dry. There is a rectangular area of scarred tissue on the right thigh, which the patient states is from a graft donor site. Neuro: Alert and oriented x3. No focal neurologic deficits are noted. Extremities: Warm and well-perfused, without clubbing, cyanosis, or edema. The patient moves all extremities equally. Psych: The patient's mood and affect are generally within normal limits for their presentation.       Diagnostic Results       RADIOLOGY:  No orders to display       LABS:   Results for orders placed or performed during the hospital encounter of 04/11/22   Clostridium Difficile Toxin/Antigen    Specimen: Stool   Result Value Ref Range    C.diff Toxin/Antigen       Indeterminate see results of C. difficile amplification(PCR)  Normal Range: Negative     CBC with Auto Differential   Result Value Ref Range    WBC 5.9 4.0 - 11.0 K/uL    RBC 4.46 4.20 - 5.90 M/uL    Hemoglobin 13.0 (L) 13.5 - 17.5 g/dL    Hematocrit 41.3 40.5 - 52.5 %    MCV 92.5 80.0 - 100.0 fL    MCH 29.2 26.0 - 34.0 pg    MCHC 31.6 31.0 - 36.0 g/dL    RDW 19.4 (H) 12.4 - 15.4 %    Platelets 985 675 - 730 K/uL    MPV 9.2 5.0 - 10.5 fL    Neutrophils % 76.9 %    Lymphocytes % 14.8 %    Monocytes % 6.2 %    Eosinophils % 0.5 %    Basophils % 1.6 %    Neutrophils Absolute 4.6 1.7 - 7.7 K/uL    Lymphocytes Absolute 0.9 (L) 1.0 - 5.1 K/uL    Monocytes Absolute 0.4 0.0 - 1.3 K/uL    Eosinophils Absolute 0.0 0.0 - 0.6 K/uL    Basophils Absolute 0.1 0.0 - 0.2 K/uL   Basic Metabolic Panel w/ Reflex to MG   Result Value Ref Range    Sodium 140 136 - 145 mmol/L    Potassium reflex Magnesium 4.4 3.5 - 5.1 mmol/L    Chloride 109 99 - 110 mmol/L    CO2 22 21 - 32 mmol/L    Anion Gap 9 3 - 16    Glucose 109 (H) 70 - 99 mg/dL    BUN 38 (H) 7 - 20 mg/dL    CREATININE 1.1 0.8 - 1.3 mg/dL    GFR Non-African American >60 >60    GFR African American >60 >60    Calcium 8.8 8.3 - 10.6 mg/dL   Blood gas, venous (Lab)   Result Value Ref Range    pH, Nick 7.245 (L) 7.350 - 7.450    pCO2, Nick 51.6 (H) 41.0 - 51.0 mmHg    pO2, Nick 36.9 25.0 - 40.0 mmHg    HCO3, Venous 22.4 (L) 24.0 - 28.0 mmol/L    Base Excess, Nick -5.4 (L) -2.0 - 3.0 mmol/L    O2 Sat, Nick 59 Not established %    Carboxyhemoglobin 1.2 0.0 - 1.5 %    MetHgb, Nick 0.4 0.0 - 1.5 %    TC02 (Calc), Nick 24 mmol/L    Hemoglobin, Nick, Reduced 40.10 %   Lactic Acid   Result Value Ref Range    Lactic Acid 1.6 0.4 - 2.0 mmol/L   POCT BLOOD OCCULT   Result Value Ref Range    OCCULT BLOOD FECAL negative     QC OK? ok    POCT Blood Occult   Result Value Ref Range    POC Occult Blood Stool Negative Negative   TYPE AND SCREEN   Result Value Ref Range    ABO/Rh A POS Antibody Screen NEG          RECENT VITALS:  BP: 135/63, Temp: 99.2 °F (37.3 °C), Pulse: 72, Resp: 13, SpO2: 96 %     Procedures       ED Course     Nursing Notes, Past Medical Hx, Past Surgical Hx, Social Hx, Allergies, and Family Hx were reviewed. The patient was given the following medications:  Orders Placed This Encounter   Medications    lactated ringers bolus    loperamide (RA ANTI-DIARRHEAL) 2 MG capsule     Sig: Take 1 capsule by mouth 4 times daily as needed for Diarrhea     Dispense:  20 capsule     Refill:  0       CONSULTS:  None    MEDICAL DECISION MAKING / ASSESSMENT / PLAN     Sheldon Gore is a [de-identified] y.o. male who presents to the emergency department from his skilled nursing facility with watery black stool, which was concerning to the facility for possible melena. The patient had some nausea but no vomiting, and began with this watery stool during the night last night. He has some vague periumbilical abdominal pain, with mild discomfort to palpation, but an overall soft, benign abdomen. The patient's stool is Hemoccult negative. Given the watery dark stool, infectious stool studies were ordered. Many of these are pending, although the C. difficile antigen test is negative. Patient has a mild metabolic acidosis which appears to be related to bicarb loss, but otherwise laboratory evaluation is reassuring. The patient had 2 episodes of diarrhea while in the emergency department, the most recent of which was 2 hours ago, and has had no vomiting. At this point, most likely etiology is a viral gastroenteritis, which has been prevalent in the community for the last several weeks. Treatment with Imodium would be appropriate, and there is no indication for admission otherwise. Clinical Impression     1.  Diarrhea, unspecified type        Disposition     PATIENT REFERRED TO:  Guillermina Tran    Schedule an appointment as soon as possible for a visit         DISCHARGE MEDICATIONS:  New Prescriptions    LOPERAMIDE (RA ANTI-DIARRHEAL) 2 MG CAPSULE    Take 1 capsule by mouth 4 times daily as needed for Diarrhea       DISPOSITION Decision To Discharge    (Please note that portions of this note were completed with voice recognition software.   Efforts were made to edit the dictations but occasionally words are mis-transcribed.)     Vincent Lagunas MD  04/11/22 8955

## 2022-04-11 NOTE — ED NOTES
Changed undergarment with watery black stool. Medium amount. Noted redness on sacrum. Cleansed with  and applied barrier cream. Scrotum swollen and reddened. Cleaned and applied barrier cream. On left hip there is a blister with red liquid, and a rectangular red spot.       Fabi Leon RN  04/11/22 2809

## 2022-04-12 LAB
C. DIFFICILE TOXIN MOLECULAR: ABNORMAL
GI BACTERIAL PATHOGENS BY PCR: NORMAL
ORGANISM: ABNORMAL

## 2022-04-15 LAB — INTERPRETATION: NEGATIVE

## 2022-04-22 ENCOUNTER — HOSPITAL ENCOUNTER (INPATIENT)
Age: 82
LOS: 3 days | Discharge: NURSING, REHAB OR LONG-TERM CARE | DRG: 674 | End: 2022-04-25
Attending: EMERGENCY MEDICINE | Admitting: INTERNAL MEDICINE
Payer: MEDICARE

## 2022-04-22 DIAGNOSIS — N39.0 URINARY TRACT INFECTION ASSOCIATED WITH INDWELLING URETHRAL CATHETER, INITIAL ENCOUNTER (HCC): ICD-10-CM

## 2022-04-22 DIAGNOSIS — T83.511A URINARY TRACT INFECTION ASSOCIATED WITH INDWELLING URETHRAL CATHETER, INITIAL ENCOUNTER (HCC): ICD-10-CM

## 2022-04-22 DIAGNOSIS — Z16.24 MULTIPLE DRUG RESISTANT ORGANISM (MDRO) CULTURE POSITIVE: Primary | ICD-10-CM

## 2022-04-22 LAB
A/G RATIO: 0.9 (ref 1.1–2.2)
ALBUMIN SERPL-MCNC: 3 G/DL (ref 3.4–5)
ALP BLD-CCNC: 45 U/L (ref 40–129)
ALT SERPL-CCNC: 25 U/L (ref 10–40)
ANION GAP SERPL CALCULATED.3IONS-SCNC: 9 MMOL/L (ref 3–16)
AST SERPL-CCNC: 20 U/L (ref 15–37)
BACTERIA: ABNORMAL /HPF
BASOPHILS ABSOLUTE: 0 K/UL (ref 0–0.2)
BASOPHILS RELATIVE PERCENT: 0.5 %
BILIRUB SERPL-MCNC: <0.2 MG/DL (ref 0–1)
BILIRUBIN URINE: NEGATIVE
BLOOD, URINE: NEGATIVE
BUN BLDV-MCNC: 27 MG/DL (ref 7–20)
CALCIUM SERPL-MCNC: 8.6 MG/DL (ref 8.3–10.6)
CHLORIDE BLD-SCNC: 110 MMOL/L (ref 99–110)
CLARITY: ABNORMAL
CO2: 23 MMOL/L (ref 21–32)
COLOR: YELLOW
CREAT SERPL-MCNC: 0.9 MG/DL (ref 0.8–1.3)
EOSINOPHILS ABSOLUTE: 0.7 K/UL (ref 0–0.6)
EOSINOPHILS RELATIVE PERCENT: 9.4 %
EPITHELIAL CELLS, UA: ABNORMAL /HPF (ref 0–5)
GFR AFRICAN AMERICAN: >60
GFR NON-AFRICAN AMERICAN: >60
GLUCOSE BLD-MCNC: 119 MG/DL (ref 70–99)
GLUCOSE URINE: NEGATIVE MG/DL
HCT VFR BLD CALC: 37.7 % (ref 40.5–52.5)
HEMOGLOBIN: 11.7 G/DL (ref 13.5–17.5)
KETONES, URINE: NEGATIVE MG/DL
LEUKOCYTE ESTERASE, URINE: ABNORMAL
LIPASE: 14 U/L (ref 13–60)
LYMPHOCYTES ABSOLUTE: 1.5 K/UL (ref 1–5.1)
LYMPHOCYTES RELATIVE PERCENT: 20.7 %
MCH RBC QN AUTO: 28.9 PG (ref 26–34)
MCHC RBC AUTO-ENTMCNC: 31.1 G/DL (ref 31–36)
MCV RBC AUTO: 93 FL (ref 80–100)
MICROSCOPIC EXAMINATION: YES
MONOCYTES ABSOLUTE: 0.6 K/UL (ref 0–1.3)
MONOCYTES RELATIVE PERCENT: 8 %
NEUTROPHILS ABSOLUTE: 4.5 K/UL (ref 1.7–7.7)
NEUTROPHILS RELATIVE PERCENT: 61.4 %
NITRITE, URINE: POSITIVE
PDW BLD-RTO: 18.4 % (ref 12.4–15.4)
PH UA: >=9 (ref 5–8)
PLATELET # BLD: 180 K/UL (ref 135–450)
PMV BLD AUTO: 9.5 FL (ref 5–10.5)
POTASSIUM REFLEX MAGNESIUM: 4.6 MMOL/L (ref 3.5–5.1)
PROTEIN UA: >=300 MG/DL
RBC # BLD: 4.05 M/UL (ref 4.2–5.9)
RBC UA: ABNORMAL /HPF (ref 0–4)
SODIUM BLD-SCNC: 142 MMOL/L (ref 136–145)
SPECIFIC GRAVITY UA: <=1.005 (ref 1–1.03)
TOTAL PROTEIN: 6.3 G/DL (ref 6.4–8.2)
URINE REFLEX TO CULTURE: YES
URINE TYPE: ABNORMAL
UROBILINOGEN, URINE: 1 E.U./DL
WBC # BLD: 7.4 K/UL (ref 4–11)
WBC UA: ABNORMAL /HPF (ref 0–5)

## 2022-04-22 PROCEDURE — 6360000002 HC RX W HCPCS: Performed by: INTERNAL MEDICINE

## 2022-04-22 PROCEDURE — 99285 EMERGENCY DEPT VISIT HI MDM: CPT

## 2022-04-22 PROCEDURE — 87186 SC STD MICRODIL/AGAR DIL: CPT

## 2022-04-22 PROCEDURE — 81001 URINALYSIS AUTO W/SCOPE: CPT

## 2022-04-22 PROCEDURE — 87086 URINE CULTURE/COLONY COUNT: CPT

## 2022-04-22 PROCEDURE — 87040 BLOOD CULTURE FOR BACTERIA: CPT

## 2022-04-22 PROCEDURE — 6370000000 HC RX 637 (ALT 250 FOR IP): Performed by: INTERNAL MEDICINE

## 2022-04-22 PROCEDURE — 87077 CULTURE AEROBIC IDENTIFY: CPT

## 2022-04-22 PROCEDURE — 85025 COMPLETE CBC W/AUTO DIFF WBC: CPT

## 2022-04-22 PROCEDURE — 80053 COMPREHEN METABOLIC PANEL: CPT

## 2022-04-22 PROCEDURE — 6360000002 HC RX W HCPCS: Performed by: EMERGENCY MEDICINE

## 2022-04-22 PROCEDURE — 96365 THER/PROPH/DIAG IV INF INIT: CPT

## 2022-04-22 PROCEDURE — 83690 ASSAY OF LIPASE: CPT

## 2022-04-22 PROCEDURE — 2580000003 HC RX 258: Performed by: EMERGENCY MEDICINE

## 2022-04-22 PROCEDURE — 1200000000 HC SEMI PRIVATE

## 2022-04-22 PROCEDURE — 36415 COLL VENOUS BLD VENIPUNCTURE: CPT

## 2022-04-22 RX ORDER — LANOLIN ALCOHOL/MO/W.PET/CERES
1000 CREAM (GRAM) TOPICAL DAILY
Status: DISCONTINUED | OUTPATIENT
Start: 2022-04-23 | End: 2022-04-25 | Stop reason: HOSPADM

## 2022-04-22 RX ORDER — ONDANSETRON 2 MG/ML
4 INJECTION INTRAMUSCULAR; INTRAVENOUS EVERY 6 HOURS PRN
Status: DISCONTINUED | OUTPATIENT
Start: 2022-04-22 | End: 2022-04-25 | Stop reason: HOSPADM

## 2022-04-22 RX ORDER — SODIUM CHLORIDE 0.9 % (FLUSH) 0.9 %
5-40 SYRINGE (ML) INJECTION EVERY 12 HOURS SCHEDULED
Status: DISCONTINUED | OUTPATIENT
Start: 2022-04-22 | End: 2022-04-25 | Stop reason: HOSPADM

## 2022-04-22 RX ORDER — ENOXAPARIN SODIUM 100 MG/ML
30 INJECTION SUBCUTANEOUS 2 TIMES DAILY
Status: DISCONTINUED | OUTPATIENT
Start: 2022-04-22 | End: 2022-04-25 | Stop reason: HOSPADM

## 2022-04-22 RX ORDER — ONDANSETRON 4 MG/1
4 TABLET, ORALLY DISINTEGRATING ORAL EVERY 8 HOURS PRN
Status: DISCONTINUED | OUTPATIENT
Start: 2022-04-22 | End: 2022-04-25 | Stop reason: HOSPADM

## 2022-04-22 RX ORDER — FEEDER CONTAINER WITH PUMP SET
1 EACH MISCELLANEOUS 2 TIMES DAILY
COMMUNITY

## 2022-04-22 RX ORDER — GABAPENTIN 600 MG/1
600 TABLET ORAL 2 TIMES DAILY
Status: DISCONTINUED | OUTPATIENT
Start: 2022-04-22 | End: 2022-04-25 | Stop reason: HOSPADM

## 2022-04-22 RX ORDER — POLYETHYLENE GLYCOL 3350 17 G/17G
17 POWDER, FOR SOLUTION ORAL DAILY PRN
Status: DISCONTINUED | OUTPATIENT
Start: 2022-04-22 | End: 2022-04-25 | Stop reason: HOSPADM

## 2022-04-22 RX ORDER — DICYCLOMINE HCL 20 MG
20 TABLET ORAL 3 TIMES DAILY PRN
Status: DISCONTINUED | OUTPATIENT
Start: 2022-04-22 | End: 2022-04-25 | Stop reason: HOSPADM

## 2022-04-22 RX ORDER — ACETAMINOPHEN 650 MG/1
650 SUPPOSITORY RECTAL EVERY 6 HOURS PRN
Status: DISCONTINUED | OUTPATIENT
Start: 2022-04-22 | End: 2022-04-25 | Stop reason: HOSPADM

## 2022-04-22 RX ORDER — ASPIRIN 81 MG/1
81 TABLET, CHEWABLE ORAL DAILY
Status: DISCONTINUED | OUTPATIENT
Start: 2022-04-23 | End: 2022-04-25 | Stop reason: HOSPADM

## 2022-04-22 RX ORDER — ATORVASTATIN CALCIUM 80 MG/1
80 TABLET, FILM COATED ORAL NIGHTLY
Status: DISCONTINUED | OUTPATIENT
Start: 2022-04-22 | End: 2022-04-25 | Stop reason: HOSPADM

## 2022-04-22 RX ORDER — PANTOPRAZOLE SODIUM 40 MG/1
40 TABLET, DELAYED RELEASE ORAL
Status: DISCONTINUED | OUTPATIENT
Start: 2022-04-23 | End: 2022-04-25 | Stop reason: HOSPADM

## 2022-04-22 RX ORDER — TRAZODONE HYDROCHLORIDE 100 MG/1
100 TABLET ORAL NIGHTLY
COMMUNITY

## 2022-04-22 RX ORDER — LEVOTHYROXINE SODIUM 0.07 MG/1
75 TABLET ORAL DAILY
Status: DISCONTINUED | OUTPATIENT
Start: 2022-04-23 | End: 2022-04-25 | Stop reason: HOSPADM

## 2022-04-22 RX ORDER — SODIUM CHLORIDE 0.9 % (FLUSH) 0.9 %
5-40 SYRINGE (ML) INJECTION PRN
Status: DISCONTINUED | OUTPATIENT
Start: 2022-04-22 | End: 2022-04-25 | Stop reason: HOSPADM

## 2022-04-22 RX ORDER — TAMSULOSIN HYDROCHLORIDE 0.4 MG/1
0.8 CAPSULE ORAL DAILY
Status: DISCONTINUED | OUTPATIENT
Start: 2022-04-23 | End: 2022-04-25 | Stop reason: HOSPADM

## 2022-04-22 RX ORDER — FERROUS SULFATE 325(65) MG
325 TABLET ORAL 2 TIMES DAILY
Status: DISCONTINUED | OUTPATIENT
Start: 2022-04-22 | End: 2022-04-25 | Stop reason: HOSPADM

## 2022-04-22 RX ORDER — MECOBALAMIN 5000 MCG
10 TABLET,DISINTEGRATING ORAL NIGHTLY PRN
Status: DISCONTINUED | OUTPATIENT
Start: 2022-04-22 | End: 2022-04-25

## 2022-04-22 RX ORDER — LOPERAMIDE HYDROCHLORIDE 2 MG/1
2 CAPSULE ORAL 4 TIMES DAILY PRN
COMMUNITY

## 2022-04-22 RX ORDER — ACETAMINOPHEN 325 MG/1
650 TABLET ORAL EVERY 6 HOURS PRN
Status: DISCONTINUED | OUTPATIENT
Start: 2022-04-22 | End: 2022-04-25 | Stop reason: HOSPADM

## 2022-04-22 RX ORDER — SODIUM CHLORIDE 9 MG/ML
INJECTION, SOLUTION INTRAVENOUS PRN
Status: DISCONTINUED | OUTPATIENT
Start: 2022-04-22 | End: 2022-04-25 | Stop reason: HOSPADM

## 2022-04-22 RX ADMIN — ENOXAPARIN SODIUM 30 MG: 100 INJECTION SUBCUTANEOUS at 22:35

## 2022-04-22 RX ADMIN — GABAPENTIN 600 MG: 600 TABLET, FILM COATED ORAL at 22:36

## 2022-04-22 RX ADMIN — CEFEPIME HYDROCHLORIDE 1000 MG: 1 INJECTION, POWDER, FOR SOLUTION INTRAMUSCULAR; INTRAVENOUS at 16:45

## 2022-04-22 RX ADMIN — FERROUS SULFATE TAB 325 MG (65 MG ELEMENTAL FE) 325 MG: 325 (65 FE) TAB at 22:36

## 2022-04-22 RX ADMIN — ATORVASTATIN CALCIUM 80 MG: 80 TABLET, FILM COATED ORAL at 22:36

## 2022-04-22 ASSESSMENT — PAIN DESCRIPTION - LOCATION: LOCATION: ABDOMEN

## 2022-04-22 ASSESSMENT — PAIN - FUNCTIONAL ASSESSMENT: PAIN_FUNCTIONAL_ASSESSMENT: 0-10

## 2022-04-22 NOTE — ED PROVIDER NOTES
[address] ENCOUNTER          ATTENDING PHYSICIAN NOTE        Date of evaluation: 4/22/2022    Chief Complaint     Abdominal Pain and Urinary Tract Infection      History of Present Illness     Anna Neumann is a [de-identified] y.o. male with complex past medical history including CHF, renal insufficiency, chronic indwelling Iglesias complicated by multidrug-resistant UTIs, and multiple other comorbidities presenting to the emergency department today for UTI and abdominal pain. Patient was referred in from his nursing facility after a urine culture grew a multidrug-resistant Proteus species. He was referred to the emergency department for IV antibiotics. Patient notes that he has abdominal pain in addition to this. He is overall a limited historian, but states it has been present for many weeks to months. It is throughout his entire abdomen and does not radiate. Denies fevers. States his Iglesias catheter was changed yesterday. Review of Systems     Pertinent positive and negative findings as documented in the HPI. Otherwise all other systems were reviewed and were negative. Physical Exam     INITIAL VITALS: BP: 134/61, Temp: 97.9 °F (36.6 °C), Pulse: 53, Resp: 20, SpO2: 96 %     Nursing note and vitals reviewed. General:  Adult male, alert and appropriately interactive. In no distress. HENT: Normocephalic and atraumatic. External ears normal. Nose appears normal externally. Eyes: Conjunctivae normal. No scleral icterus. Neck: Neck supple. No tracheal deviation present. CV: Normal rate. Regular rhythm. Pulm: Effort normal on room air. GI: Soft. No distension. Mild diffuse tenderness. No rebound or guarding. Baclofen pump palpable in RLQ. No peritoneal signs. : Moderate scrotal edema noted. Iglesias catheter in place with foul-smelling purulent drainage from glans and on brief. Musculoskeletal: No edema. No gross deformities.   Neurological: Alert and appropriately interactive. Face symmetric, speech without dysarthria or obvious aphasia. Moving all extremities spontaneously. Skin: Warm, dry. No rash. No diaphoresis or erythema. Procedures   Procedures    MEDICAL DECISION MAKING     MDM: Margie Le is a [de-identified] y.o. male with history as above presenting to the emergency department today with abdominal pain and concern for drug-resistant UTI. On arrival, patient in no distress, vital signs reassuring. He does have some foul-smelling drainage from the glans of his penis and cloudy urine in the catheter bag. Paperwork that was sent with the patient demonstrates a positive urine culture that has grown Proteus sensitive to only cefepime, ceftazidime, meropenem, or amikacin. As such as cefepime was ordered for the patient. Labs with urinalysis consistent with ongoing UTI, otherwise reassuring. Will discuss with the hospitalist for ongoing management of his MDRO UTI. Clinical Impression     1. Multiple drug resistant organism (MDRO) culture positive    2. Urinary tract infection associated with indwelling urethral catheter, initial encounter [facility])        Disposition     DISPOSITION Decision To Admit 04/22/2022 04:59:49 PM        Tiffanie Campoverde MD  5:00 PM                     Past Medical, Surgical, Family, and Social History     He has a past medical history of BPH (benign prostatic hyperplasia), Carotid stenosis, Cellulitis, CHF (congestive heart failure) ([address] Chronic back pain, Diverticular disease, GERD (gastroesophageal reflux disease), History of atrial fibrillation, Hypercholesteremia, Hypertension, Lower GI bleed, MDRO (multiple drug resistant organisms) resistance, Neuromuscular disorder ([address] Renal insufficiency, and Vitamin B12 deficiency. He has a past surgical history that includes back surgery (2006); Foot surgery; Coronary artery bypass graft (12/13/2013); hip surgery (Right, 5/1/2015); Cystoscopy (N/A, 1/10/2019);  Upper gastrointestinal endoscopy (N/A, 5/4/2020); sigmoidoscopy (N/A, 6/11/2020); and Leg Surgery (Right, 11/2/2021). His family history includes Heart Disease in his father. He reports that he quit smoking about 52 years ago. He has never used smokeless tobacco. He reports that he does not drink alcohol and does not use drugs. Medications     Previous Medications    ALBUTEROL SULFATE HFA (PROVENTIL HFA) 108 (90 BASE) MCG/ACT INHALER    Inhale 2 puffs into the lungs every 6 hours as needed for Wheezing or Shortness of Breath    ASPIRIN 81 MG CHEWABLE TABLET    Take 1 tablet by mouth daily    ATORVASTATIN (LIPITOR) 80 MG TABLET    Take 80 mg by mouth nightly. BALSAM PERU-CASTOR OIL (VENELEX) OINT OINTMENT    Apply topically daily as needed    DICYCLOMINE (BENTYL) 20 MG TABLET    Take 20 mg by mouth 3 times daily as needed    DOCUSATE SODIUM (COLACE) 100 MG CAPSULE    Take 100 mg by mouth daily as needed for Constipation    FERROUS SULFATE (IRON 325) 325 (65 FE) MG TABLET    Take 1 tablet by mouth 2 times daily    GABAPENTIN (NEURONTIN) 600 MG TABLET    Take 600 mg by mouth 2 times daily. GUAIFENESIN (MUCINEX) 600 MG EXTENDED RELEASE TABLET    Take 1 tablet by mouth 2 times daily as needed for Congestion    LEVOTHYROXINE (SYNTHROID) 75 MCG TABLET    Take 1 tablet by mouth Daily    MELATONIN 10 MG TABS    Take 10 mg by mouth nightly as needed     PANTOPRAZOLE (PROTONIX) 40 MG TABLET    Take 1 tablet by mouth every morning (before breakfast)    TAMSULOSIN (FLOMAX) 0.4 MG CAPSULE    Take 0.8 mg by mouth daily     VITAMIN B-12 (CYANOCOBALAMIN) 1000 MCG TABLET    Take 1,000 mcg by mouth daily       Allergies     He is allergic to bactrim [sulfamethoxazole-trimethoprim]. ED Course     Nursing Notes, Past Medical Hx, Past Surgical Hx, Social Hx,Allergies, and Family Hx were reviewed.     Patient was given the following medications:  Orders Placed This Encounter   Medications    cefepime (MAXIPIME) 1000 mg IVPB minibag     Order Specific Question:   Antimicrobial Indications     Answer:   Urinary Tract Infection       Diagnostic Results       RECENT VITALS:  BP: 134/61,Temp: 97.9 °F (36.6 °C), Pulse: 53, Resp: 20, SpO2: 96 %     RADIOLOGY:  No orders to display       LABS:   Results for orders placed or performed during the hospital encounter of 04/22/22   CBC with Auto Differential   Result Value Ref Range    WBC 7.4 4.0 - 11.0 K/uL    RBC 4.05 (L) 4.20 - 5.90 M/uL    Hemoglobin 11.7 (L) 13.5 - 17.5 g/dL    Hematocrit 37.7 (L) 40.5 - 52.5 %    MCV 93.0 80.0 - 100.0 fL    MCH 28.9 26.0 - 34.0 pg    MCHC 31.1 31.0 - 36.0 g/dL    RDW 18.4 (H) 12.4 - 15.4 %    Platelets 694 040 - 721 K/uL    MPV 9.5 5.0 - 10.5 fL    Neutrophils % 61.4 %    Lymphocytes % 20.7 %    Monocytes % 8.0 %    Eosinophils % 9.4 %    Basophils % 0.5 %    Neutrophils Absolute 4.5 1.7 - 7.7 K/uL    Lymphocytes Absolute 1.5 1.0 - 5.1 K/uL    Monocytes Absolute 0.6 0.0 - 1.3 K/uL    Eosinophils Absolute 0.7 (H) 0.0 - 0.6 K/uL    Basophils Absolute 0.0 0.0 - 0.2 K/uL   Urinalysis with Reflex to Culture    Specimen: Urine   Result Value Ref Range    Color, UA Yellow Straw/Yellow    Clarity, UA CLOUDY (A) Clear    Glucose, Ur Negative Negative mg/dL    Bilirubin Urine Negative Negative    Ketones, Urine Negative Negative mg/dL    Specific Gravity, UA <=1.005 1.005 - 1.030    Blood, Urine Negative Negative    pH, UA >=9.0 (A) 5.0 - 8.0    Protein, UA >=300 (A) Negative mg/dL    Urobilinogen, Urine 1.0 <2.0 E.U./dL    Nitrite, Urine POSITIVE (A) Negative    Leukocyte Esterase, Urine LARGE (A) Negative    Microscopic Examination YES     Urine Type Voided        CONSULTS:  None    PATIENT REFERRED TO:  No follow-up provider specified.     DISCHARGE MEDICATIONS:  New Prescriptions    No medications on file          Catherine Navarrete MD  04/22/22 0852

## 2022-04-22 NOTE — PROGRESS NOTES
List of Home Medications the patient is currently taking is complete. Home Medication list in EPIC updated to reflect changes noted below. Source of medications in list is the patients nurse at [place] [id number]. The following medications were added to admission medication list:  Dicyclomine  Loperamide  Ensure high protein  Lisandro (nutritional supplement)  Trazodone  Mepilex wound dressing    The following medications were changed from admission medication list:  Melatonin 10 mg changed to 5 mg    Please call with questions. Zack Christianson  PharmD Candidate [employer] [id number]  4/22/2022 7:50 PM    Current outpatient medication list:   Medication Sig    Nutritional Supplements (LISANDRO PO) Take 1 packet by mouth 2 times daily    Nutritional Supplements (ENSURE HIGH PROTEIN) LIQD Take 1 Can by mouth 2 times daily    Wound Dressings (MEPILEX EX) Apply topically Cleanse skin graft areas with normal saline, pat dry, apply mepilex foam board, change every 3 days as needed.     traZODone (DESYREL) 50 MG tablet Take 25 mg by mouth nightly    loperamide (IMODIUM) 2 MG capsule Take 2 mg by mouth 4 times daily as needed for Diarrhea     loperamide (RA ANTI-DIARRHEAL) 2 MG capsule Take 1 capsule by mouth 4 times daily as needed for Diarrhea    levothyroxine (SYNTHROID) 75 MCG tablet Take 1 tablet by mouth Daily    albuterol sulfate HFA (PROVENTIL HFA) 108 (90 Base) MCG/ACT inhaler Inhale 2 puffs into the lungs every 6 hours as needed for Wheezing or Shortness of Breath    guaiFENesin (MUCINEX) 600 MG extended release tablet Take 1 tablet by mouth 2 times daily as needed for Congestion    ferrous sulfate (IRON 325) 325 (65 Fe) MG tablet Take 1 tablet by mouth 2 times daily    Balsam [place]-Castor Oil (VENELEX) OINT ointment Apply topically daily as needed    docusate sodium (COLACE) 100 MG capsule Take 100 mg by mouth daily as needed for Constipation    gabapentin (NEURONTIN) 600 MG tablet Take 600 mg by mouth 2 times daily.  dicyclomine (BENTYL) 20 MG tablet Take 20 mg by mouth 3 times daily as needed    vitamin B-12 (CYANOCOBALAMIN) 1000 MCG tablet Take 1,000 mcg by mouth daily    aspirin 81 MG chewable tablet Take 1 tablet by mouth daily    pantoprazole (PROTONIX) 40 MG tablet Take 1 tablet by mouth every morning (before breakfast)    tamsulosin (FLOMAX) 0.4 MG capsule Take 0.8 mg by mouth daily     atorvastatin (LIPITOR) 80 MG tablet Take 80 mg by mouth nightly.

## 2022-04-23 LAB
ANION GAP SERPL CALCULATED.3IONS-SCNC: 5 MMOL/L (ref 3–16)
BASOPHILS ABSOLUTE: 0.1 K/UL (ref 0–0.2)
BASOPHILS RELATIVE PERCENT: 0.7 %
BUN BLDV-MCNC: 27 MG/DL (ref 7–20)
CALCIUM SERPL-MCNC: 8.7 MG/DL (ref 8.3–10.6)
CHLORIDE BLD-SCNC: 104 MMOL/L (ref 99–110)
CO2: 24 MMOL/L (ref 21–32)
CREAT SERPL-MCNC: 0.9 MG/DL (ref 0.8–1.3)
EOSINOPHILS ABSOLUTE: 0.4 K/UL (ref 0–0.6)
EOSINOPHILS RELATIVE PERCENT: 4.2 %
GFR AFRICAN AMERICAN: >60
GFR NON-AFRICAN AMERICAN: >60
GLUCOSE BLD-MCNC: 147 MG/DL (ref 70–99)
HCT VFR BLD CALC: 39.9 % (ref 40.5–52.5)
HEMOGLOBIN: 12.7 G/DL (ref 13.5–17.5)
LYMPHOCYTES ABSOLUTE: 1.3 K/UL (ref 1–5.1)
LYMPHOCYTES RELATIVE PERCENT: 11.9 %
MCH RBC QN AUTO: 29.1 PG (ref 26–34)
MCHC RBC AUTO-ENTMCNC: 31.9 G/DL (ref 31–36)
MCV RBC AUTO: 91.3 FL (ref 80–100)
MONOCYTES ABSOLUTE: 0.6 K/UL (ref 0–1.3)
MONOCYTES RELATIVE PERCENT: 5.5 %
NEUTROPHILS ABSOLUTE: 8.3 K/UL (ref 1.7–7.7)
NEUTROPHILS RELATIVE PERCENT: 77.7 %
PDW BLD-RTO: 18 % (ref 12.4–15.4)
PLATELET # BLD: 188 K/UL (ref 135–450)
PMV BLD AUTO: 9.7 FL (ref 5–10.5)
POTASSIUM REFLEX MAGNESIUM: 4.6 MMOL/L (ref 3.5–5.1)
RBC # BLD: 4.37 M/UL (ref 4.2–5.9)
SODIUM BLD-SCNC: 133 MMOL/L (ref 136–145)
WBC # BLD: 10.6 K/UL (ref 4–11)

## 2022-04-23 PROCEDURE — 51798 US URINE CAPACITY MEASURE: CPT

## 2022-04-23 PROCEDURE — 6370000000 HC RX 637 (ALT 250 FOR IP): Performed by: INTERNAL MEDICINE

## 2022-04-23 PROCEDURE — 1200000000 HC SEMI PRIVATE

## 2022-04-23 PROCEDURE — 2580000003 HC RX 258: Performed by: INTERNAL MEDICINE

## 2022-04-23 PROCEDURE — 6360000002 HC RX W HCPCS: Performed by: INTERNAL MEDICINE

## 2022-04-23 PROCEDURE — 85025 COMPLETE CBC W/AUTO DIFF WBC: CPT

## 2022-04-23 PROCEDURE — 80048 BASIC METABOLIC PNL TOTAL CA: CPT

## 2022-04-23 RX ADMIN — SODIUM CHLORIDE, PRESERVATIVE FREE 10 ML: 5 INJECTION INTRAVENOUS at 08:37

## 2022-04-23 RX ADMIN — TAMSULOSIN HYDROCHLORIDE 0.8 MG: 0.4 CAPSULE ORAL at 08:35

## 2022-04-23 RX ADMIN — ASPIRIN 81 MG 81 MG: 81 TABLET ORAL at 08:35

## 2022-04-23 RX ADMIN — FERROUS SULFATE TAB 325 MG (65 MG ELEMENTAL FE) 325 MG: 325 (65 FE) TAB at 20:34

## 2022-04-23 RX ADMIN — PANTOPRAZOLE SODIUM 40 MG: 40 TABLET, DELAYED RELEASE ORAL at 06:44

## 2022-04-23 RX ADMIN — ACETAMINOPHEN 650 MG: 325 TABLET ORAL at 23:21

## 2022-04-23 RX ADMIN — CYANOCOBALAMIN TAB 1000 MCG 1000 MCG: 1000 TAB at 08:35

## 2022-04-23 RX ADMIN — Medication 10 MG: at 23:21

## 2022-04-23 RX ADMIN — GABAPENTIN 600 MG: 600 TABLET, FILM COATED ORAL at 08:35

## 2022-04-23 RX ADMIN — CEFEPIME HYDROCHLORIDE 2000 MG: 2 INJECTION, POWDER, FOR SOLUTION INTRAVENOUS at 04:16

## 2022-04-23 RX ADMIN — LEVOTHYROXINE SODIUM 75 MCG: 0.07 TABLET ORAL at 06:44

## 2022-04-23 RX ADMIN — FERROUS SULFATE TAB 325 MG (65 MG ELEMENTAL FE) 325 MG: 325 (65 FE) TAB at 08:35

## 2022-04-23 RX ADMIN — ATORVASTATIN CALCIUM 80 MG: 80 TABLET, FILM COATED ORAL at 20:34

## 2022-04-23 RX ADMIN — CEFEPIME HYDROCHLORIDE 2000 MG: 2 INJECTION, POWDER, FOR SOLUTION INTRAVENOUS at 17:10

## 2022-04-23 RX ADMIN — GABAPENTIN 600 MG: 600 TABLET, FILM COATED ORAL at 20:34

## 2022-04-23 RX ADMIN — ENOXAPARIN SODIUM 30 MG: 100 INJECTION SUBCUTANEOUS at 08:35

## 2022-04-23 RX ADMIN — ENOXAPARIN SODIUM 30 MG: 100 INJECTION SUBCUTANEOUS at 20:33

## 2022-04-23 RX ADMIN — SODIUM CHLORIDE: 9 INJECTION, SOLUTION INTRAVENOUS at 04:12

## 2022-04-23 RX ADMIN — SODIUM CHLORIDE, PRESERVATIVE FREE 10 ML: 5 INJECTION INTRAVENOUS at 20:34

## 2022-04-23 ASSESSMENT — PAIN SCALES - GENERAL
PAINLEVEL_OUTOF10: 5
PAINLEVEL_OUTOF10: 3

## 2022-04-23 ASSESSMENT — PAIN DESCRIPTION - DESCRIPTORS: DESCRIPTORS: ACHING

## 2022-04-23 ASSESSMENT — PAIN DESCRIPTION - LOCATION
LOCATION: BACK
LOCATION: BACK;BUTTOCKS

## 2022-04-23 ASSESSMENT — PAIN - FUNCTIONAL ASSESSMENT: PAIN_FUNCTIONAL_ASSESSMENT: ACTIVITIES ARE NOT PREVENTED

## 2022-04-23 ASSESSMENT — PAIN DESCRIPTION - ORIENTATION
ORIENTATION: MID;LOWER
ORIENTATION: LOWER;MID

## 2022-04-23 NOTE — PROGRESS NOTES
Hospitalist Progress Note      PCP: INES BLANDON [address]    Date of Admission: 4/22/2022    Chief Complaint: Abdominal pain    Hospital Course: 44-year-old male with past medical history of chronic indwelling Iglesias complicated by recurrent UTIs presented to hospital with abdominal pain    Subjective: No acute events ported overnight. Patient reports improvement in his pain so far. Denies any new concerns. States he is feeling better      Medications:  Reviewed    Infusion Medications    sodium chloride 25 mL/hr at 04/23/22 9218     Scheduled Medications    atorvastatin  80 mg Oral Nightly    tamsulosin  0.8 mg Oral Daily    pantoprazole  40 mg Oral QAM AC    vitamin B-12  1,000 mcg Oral Daily    gabapentin  600 mg Oral BID    levothyroxine  75 mcg Oral Daily    aspirin  81 mg Oral Daily    ferrous sulfate  325 mg Oral BID    sodium chloride flush  5-40 mL IntraVENous 2 times per day    enoxaparin  30 mg SubCUTAneous BID    cefepime  2,000 mg IntraVENous Q12H     PRN Meds: dicyclomine, melatonin, sodium chloride flush, sodium chloride, ondansetron **OR** ondansetron, polyethylene glycol, acetaminophen **OR** acetaminophen      Intake/Output Summary (Last 24 hours) at 4/23/2022 1443  Last data filed at 4/23/2022 1431  Gross per 24 hour   Intake 1440 ml   Output 450 ml   Net 990 ml       Physical Exam Performed:    /79   Pulse 80   Temp 97.8 °F (36.6 °C) (Oral)   Resp 18   Ht 5' 11\" (1.803 m)   Wt 232 lb 12.8 oz (105.6 kg)   SpO2 96%   BMI 32.47 kg/m²     General appearance: Disheveled appearance  HEENT: Pupils equal, round, and reactive to light. Conjunctivae/corneas clear. Neck: Supple, with full range of motion. No jugular venous distention. Trachea midline. Respiratory:  Normal respiratory effort. Clear to auscultation, bilaterally without Rales/Wheezes/Rhonchi. Cardiovascular: Regular rate and rhythm with normal S1/S2 without murmurs, rubs or gallops.   Abdomen: Soft, non-tender, non-distended with normal bowel sounds. Chronic Iglesias in place  Musculoskeletal: No clubbing, cyanosis or edema bilaterally. Full range of motion without deformity. Skin: Skin color, texture, turgor normal.  No rashes or lesions. Neurologic:  Neurovascularly intact without any focal sensory/motor deficits. Cranial nerves: II-XII intact, grossly non-focal.  Psychiatric: Alert and oriented, thought content appropriate, normal insight  Capillary Refill: Brisk,3 seconds, normal   Peripheral Pulses: +2 palpable, equal bilaterally       Labs:   Recent Labs     04/22/22  1637 04/23/22  0655   WBC 7.4 10.6   HGB 11.7* 12.7*   HCT 37.7* 39.9*    188     Recent Labs     04/22/22  1637 04/23/22  0655    133*   K 4.6 4.6    104   CO2 23 24   BUN 27* 27*   CREATININE 0.9 0.9   CALCIUM 8.6 8.7     Recent Labs     04/22/22  1637   AST 20   ALT 25   BILITOT <0.2   ALKPHOS 45     No results for input(s): INR in the last 72 hours. No results for input(s): Winford Berkshire in the last 72 hours. Urinalysis:      Lab Results   Component Value Date    NITRU POSITIVE 04/22/2022    WBCUA 21-50 04/22/2022    BACTERIA 4+ 04/22/2022    RBCUA 3-4 04/22/2022    BLOODU Negative 04/22/2022    SPECGRAV <=1.005 04/22/2022    GLUCOSEU Negative 04/22/2022       Radiology:  No orders to display           Assessment/Plan:    Active Hospital Problems    Diagnosis     UTI (urinary tract infection) [N39.0]      Priority: Medium        Urinary tract infection in a patient with a chronic indwelling Iglesias catheter  - Urine culture at the nursing facility positive for Proteus sensitive to cefepime  - Continue IV cefepime  -Await repeat urine culture     Hypothyroidism  - Continue Synthroid     Peripheral neuropathy  - Continue gabapentin     History of coronary disease  - Continue aspirin, statin    DVT Prophylaxis: Lovenox  Diet: ADULT DIET;  Regular  Code Status: Full Code    PT/OT Eval Status: Pending    Dispo -likely discharge by tomorrow    Bibiana Broussard MD

## 2022-04-23 NOTE — PROGRESS NOTES
Dressing and wrapped changed on RLE changed. Wound on RLE is red. Wrapped changed on LLE. Left foot at little toe has drainage. Wound on left heel. Will continue to monitor.

## 2022-04-23 NOTE — PROGRESS NOTES
VSS A/Ox4 pt is calm/cooperative. Pt is tolerating meals without nausea. Schneider remains patent with adequate output. Pt has no c/o pain with schneider. Pt is encourage to keep oral fluid intake. Fall precuations in place call light in reach bed in lowest position. Will continue to monitor.

## 2022-04-23 NOTE — PROGRESS NOTES
Patient is alert and oriented x4. VSS with exceptions of elevated 'B systolic. Schneider in place, cleaned of pus around insertion site; site is very redden, swollen and excoriated. Schneider in newly placed but very little output. Patient is drinking plenty of fluids but still no output from cath ether and patient is complaining of a lot of pain in abdomen, back and tip of penis. Bladder scan reported approx 350 mls. Flushed catheter with 30 mls and now schneider is now draining adequately. Urine is yellow with sediment. Patient reporting pain is better. Will continue to monitor for needs.

## 2022-04-23 NOTE — PROGRESS NOTES
Physician Progress Note      Sotero Gutierrez  CSN #:                  [id number]  :                       1940  ADMIT DATE:       2022 3:31 PM  [address] [place] DATE:  RESPONDING  PROVIDER #:        Vern Summers MD          QUERY TEXT:    Pt admitted with UTI. Pt noted to have a chronic indwelling urinary catheter. If possible, please document in the progress notes and discharge summary if   you are evaluating and/or treating any of the following: The medical record reflects the following:  Risk Factors: [de-identified] y.o. male with UTI and has chronic indwelling schneider catheter,   Hx of catheter related UTIs  Clinical Indicators: presented with abdominal pain, UCx with >100,000 Gram   negative rods  Treatment: Cefepime  Options provided:  -- UTI likely due to chronic indwelling urinary catheter  -- UTI not due to indwelling urinary catheter  -- Other - I will add my own diagnosis  -- Disagree - Not applicable / Not valid  -- Disagree - Clinically unable to determine / Unknown  -- Refer to Clinical Documentation Reviewer    PROVIDER RESPONSE TEXT:    UTI is likely due to the chronic indwelling urinary catheter.     Query created by: Abigail Nagy on 2022 2:05 PM      Electronically signed by:  Vern Summers MD 2022 2:44 PM

## 2022-04-23 NOTE — PROGRESS NOTES
4 Eyes Admission Assessment     I agree as the admission nurse that 2 RN's have performed a thorough Head to Toe Skin Assessment on the patient. ALL assessment sites listed below have been assessed on admission. Areas assessed by both nurses:   [x]   Head, Face, and Ears   [x]   Shoulders, Back, and Chest  [x]   Arms, Elbows, and Hands   [x]   Coccyx, Sacrum, and Ischium  [x]   Legs, Feet, and Heels        Does the Patient have Skin Breakdown? Yes a wound was noted on the Admission Assessment and an LDA was Initiated documentation include the Usha-wound, Wound Assessment, Measurements, Dressing Treatment, Drainage, and Color\",     Pt with excoriation on coccyx and in groin. Pt with eschar and drainage from toes on bilateral feet. Pt with scattered bruising and abrasions. Pt with old graft site that is reddened on right upper thigh. Pt with redness to penis.         Terrance Prevention initiated:  Yes   Wound Care Orders initiated:  Yes      [address]  nurse consulted for Pressure Injury (Stage 3,4, Unstageable, DTI, NWPT, and Complex wounds) or Terrance score 18 or lower:  yes      Nurse 1 eSignature: Electronically signed by Audrey Obrien RN on 4/23/22 at 1:01 AM EDT    **SHARE this note so that the co-signing nurse is able to place an eSignature**    Nurse 2 eSignature: Electronically signed by Isaac Eugene RN on 4/23/22 at 1:25 AM EDT

## 2022-04-24 LAB
ANION GAP SERPL CALCULATED.3IONS-SCNC: 9 MMOL/L (ref 3–16)
BUN BLDV-MCNC: 29 MG/DL (ref 7–20)
CALCIUM SERPL-MCNC: 8.2 MG/DL (ref 8.3–10.6)
CHLORIDE BLD-SCNC: 106 MMOL/L (ref 99–110)
CO2: 22 MMOL/L (ref 21–32)
CREAT SERPL-MCNC: 0.9 MG/DL (ref 0.8–1.3)
GFR AFRICAN AMERICAN: >60
GFR NON-AFRICAN AMERICAN: >60
GLUCOSE BLD-MCNC: 88 MG/DL (ref 70–99)
ORGANISM: ABNORMAL
POTASSIUM SERPL-SCNC: 4.7 MMOL/L (ref 3.5–5.1)
SODIUM BLD-SCNC: 137 MMOL/L (ref 136–145)
URINE CULTURE, ROUTINE: ABNORMAL

## 2022-04-24 PROCEDURE — 6370000000 HC RX 637 (ALT 250 FOR IP): Performed by: INTERNAL MEDICINE

## 2022-04-24 PROCEDURE — 2580000003 HC RX 258: Performed by: INTERNAL MEDICINE

## 2022-04-24 PROCEDURE — 6360000002 HC RX W HCPCS: Performed by: INTERNAL MEDICINE

## 2022-04-24 PROCEDURE — 36415 COLL VENOUS BLD VENIPUNCTURE: CPT

## 2022-04-24 PROCEDURE — 1200000000 HC SEMI PRIVATE

## 2022-04-24 PROCEDURE — 80048 BASIC METABOLIC PNL TOTAL CA: CPT

## 2022-04-24 RX ADMIN — ENOXAPARIN SODIUM 30 MG: 100 INJECTION SUBCUTANEOUS at 21:03

## 2022-04-24 RX ADMIN — FERROUS SULFATE TAB 325 MG (65 MG ELEMENTAL FE) 325 MG: 325 (65 FE) TAB at 21:02

## 2022-04-24 RX ADMIN — CEFEPIME HYDROCHLORIDE 2000 MG: 2 INJECTION, POWDER, FOR SOLUTION INTRAVENOUS at 04:56

## 2022-04-24 RX ADMIN — ASPIRIN 81 MG 81 MG: 81 TABLET ORAL at 08:12

## 2022-04-24 RX ADMIN — GABAPENTIN 600 MG: 600 TABLET, FILM COATED ORAL at 21:02

## 2022-04-24 RX ADMIN — ACETAMINOPHEN 650 MG: 325 TABLET ORAL at 19:22

## 2022-04-24 RX ADMIN — GABAPENTIN 600 MG: 600 TABLET, FILM COATED ORAL at 08:12

## 2022-04-24 RX ADMIN — ENOXAPARIN SODIUM 30 MG: 100 INJECTION SUBCUTANEOUS at 08:12

## 2022-04-24 RX ADMIN — SODIUM CHLORIDE, PRESERVATIVE FREE 10 ML: 5 INJECTION INTRAVENOUS at 21:03

## 2022-04-24 RX ADMIN — TAMSULOSIN HYDROCHLORIDE 0.8 MG: 0.4 CAPSULE ORAL at 08:12

## 2022-04-24 RX ADMIN — PANTOPRAZOLE SODIUM 40 MG: 40 TABLET, DELAYED RELEASE ORAL at 07:34

## 2022-04-24 RX ADMIN — CYANOCOBALAMIN TAB 1000 MCG 1000 MCG: 1000 TAB at 08:12

## 2022-04-24 RX ADMIN — CEFEPIME HYDROCHLORIDE 2000 MG: 2 INJECTION, POWDER, FOR SOLUTION INTRAVENOUS at 15:29

## 2022-04-24 RX ADMIN — ATORVASTATIN CALCIUM 80 MG: 80 TABLET, FILM COATED ORAL at 21:02

## 2022-04-24 RX ADMIN — LEVOTHYROXINE SODIUM 75 MCG: 0.07 TABLET ORAL at 07:34

## 2022-04-24 RX ADMIN — FERROUS SULFATE TAB 325 MG (65 MG ELEMENTAL FE) 325 MG: 325 (65 FE) TAB at 08:12

## 2022-04-24 ASSESSMENT — PAIN DESCRIPTION - ORIENTATION: ORIENTATION: LEFT

## 2022-04-24 ASSESSMENT — PAIN SCALES - GENERAL
PAINLEVEL_OUTOF10: 7
PAINLEVEL_OUTOF10: 7

## 2022-04-24 ASSESSMENT — PAIN DESCRIPTION - LOCATION: LOCATION: FOOT

## 2022-04-24 NOTE — PROGRESS NOTES
Pt ao4, vss. Denied pain, n/v,sob. Had 2 soft bm this morning. Pt calls out appropriately. Pt is moved to specialty air mattress. Bed aalrm remains engaged. Pt is turned and repositioned. Iglesias care given this morning.  Will continue to monitor

## 2022-04-24 NOTE — PROGRESS NOTES
A/Ox4 VSS pt received bath and shaved. Pt skin around groin/buttock is red and open. Cream applied to buttock and inter dry to skin fold. Pt dressing bilateral LE clean, dry and intact. Pt has no c/o pain at this time. Boots on feet. Pt is tolerating diet well. Iglesias in place with yellow output. Pt scrotum enlarged and red. Fall precautions in place call light in reach bed in lowest position. Will continue to monitor.

## 2022-04-24 NOTE — PROGRESS NOTES
Hospitalist Progress Note      PCP: INES BLANDON [address]    Date of Admission: 4/22/2022    Chief Complaint: Abdominal pain    Hospital Course: 78-year-old male with past medical history of chronic indwelling Iglesias complicated by recurrent UTIs presented to hospital with abdominal pain    Subjective: No acute events reported overnight. Feels about the same as yesterday. No new concerns      Medications:  Reviewed    Infusion Medications    sodium chloride 25 mL/hr at 04/23/22 3032     Scheduled Medications    atorvastatin  80 mg Oral Nightly    tamsulosin  0.8 mg Oral Daily    pantoprazole  40 mg Oral QAM AC    vitamin B-12  1,000 mcg Oral Daily    gabapentin  600 mg Oral BID    levothyroxine  75 mcg Oral Daily    aspirin  81 mg Oral Daily    ferrous sulfate  325 mg Oral BID    sodium chloride flush  5-40 mL IntraVENous 2 times per day    enoxaparin  30 mg SubCUTAneous BID    cefepime  2,000 mg IntraVENous Q12H     PRN Meds: dicyclomine, melatonin, sodium chloride flush, sodium chloride, ondansetron **OR** ondansetron, polyethylene glycol, acetaminophen **OR** acetaminophen      Intake/Output Summary (Last 24 hours) at 4/24/2022 1222  Last data filed at 4/24/2022 2034  Gross per 24 hour   Intake 600 ml   Output 1125 ml   Net -525 ml       Physical Exam Performed:    /75   Pulse 64   Temp 97.6 °F (36.4 °C) (Oral)   Resp 18   Ht 5' 11\" (1.803 m)   Wt 232 lb 12.8 oz (105.6 kg)   SpO2 94%   BMI 32.47 kg/m²     General appearance: Disheveled appearance  HEENT: Pupils equal, round, and reactive to light. Conjunctivae/corneas clear. Neck: Supple, with full range of motion. No jugular venous distention. Trachea midline. Respiratory:  Normal respiratory effort. Clear to auscultation, bilaterally without Rales/Wheezes/Rhonchi. Cardiovascular: Regular rate and rhythm with normal S1/S2 without murmurs, rubs or gallops. Abdomen: Soft, non-tender, non-distended with normal bowel sounds.   Chronic Iglesias in place  Musculoskeletal: No clubbing, cyanosis or edema bilaterally. Full range of motion without deformity. Skin: Skin color, texture, turgor normal.  No rashes or lesions. Neurologic:  Neurovascularly intact without any focal sensory/motor deficits. Cranial nerves: II-XII intact, grossly non-focal.  Psychiatric: Alert and oriented, thought content appropriate, normal insight  Capillary Refill: Brisk,3 seconds, normal   Peripheral Pulses: +2 palpable, equal bilaterally       Labs:   Recent Labs     04/22/22  1637 04/23/22  0655   WBC 7.4 10.6   HGB 11.7* 12.7*   HCT 37.7* 39.9*    188     Recent Labs     04/22/22  1637 04/23/22  0655    133*   K 4.6 4.6    104   CO2 23 24   BUN 27* 27*   CREATININE 0.9 0.9   CALCIUM 8.6 8.7     Recent Labs     04/22/22  1637   AST 20   ALT 25   BILITOT <0.2   ALKPHOS 45     No results for input(s): INR in the last 72 hours. No results for input(s): Florian Covina in the last 72 hours. Urinalysis:      Lab Results   Component Value Date    NITRU POSITIVE 04/22/2022    WBCUA 21-50 04/22/2022    BACTERIA 4+ 04/22/2022    RBCUA 3-4 04/22/2022    BLOODU Negative 04/22/2022    SPECGRAV <=1.005 04/22/2022    GLUCOSEU Negative 04/22/2022       Radiology:  No orders to display           Assessment/Plan:    Active Hospital Problems    Diagnosis     UTI (urinary tract infection) [N39.0]      Priority: Medium        Urinary tract infection in a patient with a chronic indwelling Iglesias catheter  - Urine culture at the nursing facility positive for Proteus sensitive to cefepime  - Continue IV cefepime  -Repeat urine culture growing Proteus penneri, await sensitivities to decide oral antibiotics for discharge     Hypothyroidism  - Continue Synthroid     Peripheral neuropathy  - Continue gabapentin     History of coronary disease  - Continue aspirin, statin    DVT Prophylaxis: Lovenox  Diet: ADULT DIET;  Regular  Code Status: Full Code    PT/OT Eval Status: Pending    Dispo -likely discharge by tomorrow    Whitney Mcardle, MD

## 2022-04-25 VITALS
WEIGHT: 232.8 LBS | RESPIRATION RATE: 18 BRPM | OXYGEN SATURATION: 94 % | DIASTOLIC BLOOD PRESSURE: 73 MMHG | HEIGHT: 71 IN | SYSTOLIC BLOOD PRESSURE: 137 MMHG | TEMPERATURE: 97.7 F | BODY MASS INDEX: 32.59 KG/M2 | HEART RATE: 56 BPM

## 2022-04-25 PROBLEM — Z16.24 MULTIPLE DRUG RESISTANT ORGANISM (MDRO) CULTURE POSITIVE: Status: ACTIVE | Noted: 2022-04-25

## 2022-04-25 LAB
ANION GAP SERPL CALCULATED.3IONS-SCNC: 7 MMOL/L (ref 3–16)
BUN BLDV-MCNC: 27 MG/DL (ref 7–20)
CALCIUM SERPL-MCNC: 8.7 MG/DL (ref 8.3–10.6)
CHLORIDE BLD-SCNC: 105 MMOL/L (ref 99–110)
CO2: 24 MMOL/L (ref 21–32)
CREAT SERPL-MCNC: 0.8 MG/DL (ref 0.8–1.3)
GFR AFRICAN AMERICAN: >60
GFR NON-AFRICAN AMERICAN: >60
GLUCOSE BLD-MCNC: 87 MG/DL (ref 70–99)
POTASSIUM SERPL-SCNC: 5 MMOL/L (ref 3.5–5.1)
SODIUM BLD-SCNC: 136 MMOL/L (ref 136–145)

## 2022-04-25 PROCEDURE — 0JBR0ZZ EXCISION OF LEFT FOOT SUBCUTANEOUS TISSUE AND FASCIA, OPEN APPROACH: ICD-10-PCS | Performed by: PODIATRIST

## 2022-04-25 PROCEDURE — 80048 BASIC METABOLIC PNL TOTAL CA: CPT

## 2022-04-25 PROCEDURE — 97166 OT EVAL MOD COMPLEX 45 MIN: CPT

## 2022-04-25 PROCEDURE — 6370000000 HC RX 637 (ALT 250 FOR IP): Performed by: INTERNAL MEDICINE

## 2022-04-25 PROCEDURE — 6360000002 HC RX W HCPCS: Performed by: INTERNAL MEDICINE

## 2022-04-25 PROCEDURE — 97535 SELF CARE MNGMENT TRAINING: CPT

## 2022-04-25 PROCEDURE — 97530 THERAPEUTIC ACTIVITIES: CPT

## 2022-04-25 PROCEDURE — 97163 PT EVAL HIGH COMPLEX 45 MIN: CPT

## 2022-04-25 PROCEDURE — 36415 COLL VENOUS BLD VENIPUNCTURE: CPT

## 2022-04-25 PROCEDURE — 2580000003 HC RX 258: Performed by: INTERNAL MEDICINE

## 2022-04-25 RX ORDER — AMOXICILLIN AND CLAVULANATE POTASSIUM 875; 125 MG/1; MG/1
1 TABLET, FILM COATED ORAL 2 TIMES DAILY
Qty: 14 TABLET | Refills: 0 | Status: SHIPPED | OUTPATIENT
Start: 2022-04-25 | End: 2022-05-02

## 2022-04-25 RX ORDER — LANOLIN ALCOHOL/MO/W.PET/CERES
9 CREAM (GRAM) TOPICAL NIGHTLY PRN
Status: DISCONTINUED | OUTPATIENT
Start: 2022-04-25 | End: 2022-04-25 | Stop reason: HOSPADM

## 2022-04-25 RX ADMIN — Medication 9 MG: at 00:18

## 2022-04-25 RX ADMIN — CEFEPIME HYDROCHLORIDE 2000 MG: 2 INJECTION, POWDER, FOR SOLUTION INTRAVENOUS at 04:48

## 2022-04-25 RX ADMIN — LEVOTHYROXINE SODIUM 75 MCG: 0.07 TABLET ORAL at 06:39

## 2022-04-25 RX ADMIN — SODIUM CHLORIDE, PRESERVATIVE FREE 10 ML: 5 INJECTION INTRAVENOUS at 08:35

## 2022-04-25 RX ADMIN — FERROUS SULFATE TAB 325 MG (65 MG ELEMENTAL FE) 325 MG: 325 (65 FE) TAB at 08:34

## 2022-04-25 RX ADMIN — Medication 125 MG: at 11:40

## 2022-04-25 RX ADMIN — CYANOCOBALAMIN TAB 1000 MCG 1000 MCG: 1000 TAB at 08:34

## 2022-04-25 RX ADMIN — ENOXAPARIN SODIUM 30 MG: 100 INJECTION SUBCUTANEOUS at 08:34

## 2022-04-25 RX ADMIN — PANTOPRAZOLE SODIUM 40 MG: 40 TABLET, DELAYED RELEASE ORAL at 06:39

## 2022-04-25 RX ADMIN — ACETAMINOPHEN 650 MG: 325 TABLET ORAL at 08:34

## 2022-04-25 RX ADMIN — TAMSULOSIN HYDROCHLORIDE 0.8 MG: 0.4 CAPSULE ORAL at 08:34

## 2022-04-25 RX ADMIN — GABAPENTIN 600 MG: 600 TABLET, FILM COATED ORAL at 08:34

## 2022-04-25 RX ADMIN — ASPIRIN 81 MG 81 MG: 81 TABLET ORAL at 08:34

## 2022-04-25 ASSESSMENT — PAIN DESCRIPTION - LOCATION: LOCATION: FOOT

## 2022-04-25 ASSESSMENT — PAIN SCALES - GENERAL: PAINLEVEL_OUTOF10: 6

## 2022-04-25 ASSESSMENT — PAIN DESCRIPTION - ORIENTATION: ORIENTATION: LEFT

## 2022-04-25 NOTE — PROGRESS NOTES
Patient A&Ox4. VSS. Iglesias draining yellow urine. Scrotum is enlarged. Patient has redness in groin area and on buttock. Inter dry remains in fold of thighs. Bilat legs ace wrapped. Prevalon boots on. Patient in specialty bed. IV abx infusing per orders. Bed is in lowest setting with bed alarm on. Call light is within reach. All needs met at this time.

## 2022-04-25 NOTE — PROGRESS NOTES
Attempted to call report to [facility]. No one available at this time. Will attempt to call report later. Female Pregnancy Counseling Text: Female patients should also be on two forms of birth control while taking this medication and for one month after their last dose.

## 2022-04-25 NOTE — PLAN OF CARE
Problem: Skin/Tissue Integrity  Goal: Absence of new skin breakdown  Description: 1. Monitor for areas of redness and/or skin breakdown  2. Assess vascular access sites hourly  3. Every 4-6 hours minimum:  Change oxygen saturation probe site  4. Every 4-6 hours:  If on nasal continuous positive airway pressure, respiratory therapy assess nares and determine need for appliance change or resting period.   Outcome: Not Progressing     Problem: Pain  Goal: Verbalizes/displays adequate comfort level or baseline comfort level  Outcome: Progressing     Problem: Safety - Adult  Goal: Free from fall injury  Outcome: Progressing

## 2022-04-25 NOTE — PROGRESS NOTES
presenting at baseline today, requiring assist x2 for bed mobility, and being inappropriate to attempt transfers due to weakness. Pt plans to return to assisted living with 24H assist upon d/c. Recommend return to assisted living facility (? LTC) with continued use of raymundo lift. No skilled PT needs detected at this time due to pt being at baseline, defer any therapy needs to next level of care. Treatment Diagnosis: UTI  Therapy Prognosis: Poor (non-ambulatory and raymundo dependent at baseline)  Decision Making: High Complexity  Requires PT Follow-Up: No  Activity Tolerance  Activity Tolerance: Patient limited by fatigue     Plan   Safety Devices  Type of Devices: Bed alarm in place,Gait belt,Left in bed,Nurse notified,Call light within reach  Restraints  Restraints Initially in Place: No     Restrictions  Position Activity Restriction  Other position/activity restrictions: up as tolerated, up with assist     Subjective   General  Chart Reviewed: Yes  Patient assessed for rehabilitation services?: Yes  Additional Pertinent Hx: Pt is an [de-identified] yo male admitted 4/22/22 for UTI and abdominal pain; Previous admit: 4/11/22, 1/26-2/7/22, 1/17-1/20/22, 12/28/21, 10/31-11/4/21; PMH: CHF, renal insufficiency with chronic indwelling schneider cath, multi-drug resistant UTIs, a fib, HTN  Family / Caregiver Present: No  Referring Practitioner: Elmer Jenkins MD  Diagnosis: UTI  General Comment  Comments: Nursing notified of apparent bleeding wounds on pt's neck and back during treatment. Subjective  Subjective: Pt found supine in bed alone in the room upon arrival. He was alert and agreeable to therapy.          Social/Functional History  Social/Functional History  Lives With: Alone  Type of Home: Assisted living ([facility]  Home Equipment: Wheelchair-manual,Hospital bed,Alert Button  Has the patient had two or more falls in the past year or any fall with injury in the past year?: No (one fall, led to hospitalization)  ADL Assistance: Needs assistance (assistance bathing, able to dress himself but gets help because it takes too long)  Homemaking Assistance: Needs assistance (meals brought to room, staff cleans)  Ambulation Assistance: Non-ambulatory  Transfer Assistance: Needs assistance (raymundo lift)  Active : No  Patient's  Info: facility arranged Transportation service  Leisure & Hobbies: denies  Additional Comments: [facility] for transfers    Vision/Hearing  Hearing: Within functional limits      Cognition   Orientation  Overall Orientation Status: Within Normal Limits  Orientation Level: Oriented X4  Cognition  Overall Cognitive Status: WNL     Objective      Observation/Palpation  Posture: Poor (significant kyphosis sitting EOB, heavy cues for upright posture)  Edema: dorsal R foot edema        AROM RLE (degrees)  RLE AROM: Exceptions  RLE General AROM: DF and hip flexion WFL, knee extension limited by HS spasticity  AROM LLE (degrees)  LLE General AROM: DF and hip flexion WFL, knee extension limited by HS spasticity L>R    Strength RLE  Strength RLE: Exception  Comment: approx 3/5 hip flex, DF, knee ext. Pt unable to perform AROM knee flexion at EOB to reposition feet  Strength LLE  Strength LLE: Exception  Comment: approx 3/5 hip flex, DF, knee ext. Pt unable to perform AROM knee flexion at EOB to reposition feet           Bed mobility  Rolling to Left: Maximum assistance (HOB elevated 15 degrees, max A and cueing, R UE assist from therapist)  Supine to Sit: 2 Person assistance;Maximum assistance (pt initiated movement of LEs toward EOB, HOB flat, incrased assistance for LEs to floor, posterior lean upon sitting initially)  Sit to Supine: Dependent/Total;2 Person assistance (HOB flat)  Scooting: Dependent/Total  Comment: use of draw sheet to perform scooting, supine to sit, and sit to supine     Ambulation  WB Status: Pt non-ambulatory at baseline  Stairs/Curb  Stairs? :  (pt non-ambulatory at baseline)  Wheelchair Activities  Wheelchair Size:  (not attempted)     Balance  Posture: Poor (kyphosis sitting EOB)  Sitting - Static: Fair (min A progressing to CGA sitting EOB, posterior lean)  Sitting - Dynamic: Fair (min A progressing to CGA sitting EOB, posterior lean)  Standing - Static:  (unattempted)  Standing - Dynamic:  (unattempted)  A/AROM Exercises: DF/PF x10 each LE sitting EOB, knee flexion/extension sitting EOB x5 each LE, hip flexion sitting EOB x3 each LE - limited by fatigue      Education  Education given to: patient  Education provided: role of therapy  Education provided comments: Pt verbalized understanding  Education method: verbal  Barriers to learning: none  Education outcome: pt verbalized understanding                                                    AM-PAC Score  AM-PAC Inpatient Mobility Raw Score : 6 (04/25/22 1213)  AM-PAC Inpatient T-Scale Score : 23.55 (04/25/22 1213)  Mobility Inpatient CMS 0-100% Score: 100 (04/25/22 1213)  Mobility Inpatient CMS G-Code Modifier : CN (04/25/22 1213)                    Therapy Time   Individual Concurrent Group Co-treatment   Time In 1056         Time Out 1137         Minutes 41         Timed Code Treatment Minutes: Total Treatment Minutes:   Gloria Espana    Therapist was present, directed the patient's care, made skilled judgement, and was responsible for assessment and treatment of the patient.      Ibeth Ramos, PT

## 2022-04-25 NOTE — PROGRESS NOTES
VSS A/Ox4 pt is calm/cooperative. Drsg changed by podiatry. Ilgesias in place with yellow urine. Pt it tolerating diet without nausea. Pt has discharge orders in. Fall precautions in place call light in reach bed in lowest position will continue to monitor.

## 2022-04-25 NOTE — CONSULTS
Department of Podiatry Consult Note  Resident       Reason for Consult:  R Great Toe, space between 4th and 5th toe  Requesting Physician:  Trista Chacon MD    CHIEF COMPLAINT:  Bilateral lower extremity wounds    HISTORY OF PRESENT ILLNESS:                The patient is a [de-identified] y.o. male with significant past medical history as listed below. Podiatry was consulted for \"R great toe, space between 4th and 5th toe\". Patient reports a history of wounds to his right leg, and left foot. Patient states he had a skin graft applied to his right leg in November due to a laceration. Patient states they have been changing his dressing to his right leg every other day with xeroform. He denies any pain to his bilateral lower extremities, however reports some sensitiveness with touch to the wounds. He denies any other pedal complaints. He denies nausea, vomiting, fever, chills, shortness of breath or chest pain. Past Medical History:        Diagnosis Date    BPH (benign prostatic hyperplasia)     Carotid stenosis 12/2013    JESU 08-09% stenosis; LICA 71-36% stenosis    Cellulitis 12/2013    LLE    CHF (congestive heart failure) ([facility]     Chronic back pain     Diverticular disease     GERD (gastroesophageal reflux disease)     History of atrial fibrillation     Hypercholesteremia     Hypertension     Lower GI bleed     MDRO (multiple drug resistant organisms) resistance 10/26/2019    urine    Neuromuscular disorder ([facility])     spasticity    Renal insufficiency     Vitamin B12 deficiency      Past Surgical History:        Procedure Laterality Date    BACK SURGERY  2006    lower lumbar    CORONARY ARTERY BYPASS GRAFT  12/13/2013    CABG x 5 (Dr Katty Marin), svg to diag, om1 and 3, distal rca, kelly to lad.      CYSTOSCOPY N/A 1/10/2019    CYSTOSCOPY performed by Edna Elder MD at [facility] Right 5/1/2015    ORIF    LEG SURGERY Right 11/2/2021    RIGHT LOWER EXTREMITY ADVANCEMENT FLAP AND SPLIT THICKNESS SKIN GRAFT PLACEMENT; (WOUND- 10 CM X 5.5 CM; CLOSURE- 6 CM X 5.5 CM; SKIN GRAFT- 7.2 CM X 5 CM) performed by Selma Small MD at [address] 6/11/2020    [address] [facility] performed by Nile Daniel MD at [facility] N/A 5/4/2020    EGD BIOPSY performed by Nile Daniel MD at [address] ENDOSCOPY     Current Medications:    Current Facility-Administered Medications: melatonin tablet 9 mg, 9 mg, Oral, Nightly PRN  vancomycin (VANCOCIN) oral solution 125 mg, 125 mg, Oral, 4 times per day  atorvastatin (LIPITOR) tablet 80 mg, 80 mg, Oral, Nightly  tamsulosin (FLOMAX) capsule 0.8 mg, 0.8 mg, Oral, Daily  pantoprazole (PROTONIX) tablet 40 mg, 40 mg, Oral, QAM AC  vitamin B-12 (CYANOCOBALAMIN) tablet 1,000 mcg, 1,000 mcg, Oral, Daily  dicyclomine (BENTYL) tablet 20 mg, 20 mg, Oral, TID PRN  gabapentin (NEURONTIN) tablet 600 mg, 600 mg, Oral, BID  levothyroxine (SYNTHROID) tablet 75 mcg, 75 mcg, Oral, Daily  aspirin chewable tablet 81 mg, 81 mg, Oral, Daily  ferrous sulfate (IRON 325) tablet 325 mg, 325 mg, Oral, BID  sodium chloride flush 0.9 % injection 5-40 mL, 5-40 mL, IntraVENous, 2 times per day  sodium chloride flush 0.9 % injection 5-40 mL, 5-40 mL, IntraVENous, PRN  0.9 % sodium chloride infusion, , IntraVENous, PRN  enoxaparin Sodium (LOVENOX) injection 30 mg, 30 mg, SubCUTAneous, BID  ondansetron (ZOFRAN-ODT) disintegrating tablet 4 mg, 4 mg, Oral, Q8H PRN **OR** ondansetron (ZOFRAN) injection 4 mg, 4 mg, IntraVENous, Q6H PRN  polyethylene glycol (GLYCOLAX) packet 17 g, 17 g, Oral, Daily PRN  acetaminophen (TYLENOL) tablet 650 mg, 650 mg, Oral, Q6H PRN **OR** acetaminophen (TYLENOL) suppository 650 mg, 650 mg, Rectal, Q6H PRN  cefepime (MAXIPIME) 2000 mg IVPB minibag, 2,000 mg, IntraVENous, Q12H  Allergies:   Bactrim [sulfamethoxazole-trimethoprim]  Social History:    TOBACCO:   reports that he quit smoking about 52 years ago. He has never used smokeless tobacco.  ETOH:   reports no history of alcohol use. DRUGS:   reports no history of drug use. Family History:       Problem Relation Age of Onset    Heart Disease Father      REVIEW OF SYSTEMS:    A 12 point review of systems is unremarkable with the exception of the chief complaint. Patient specifically denies nausea, vomiting, fever, chills, shortness of breath, chest pain, abdominal pain, constipation, or difficulty urinating. PHYSICAL EXAM:      Vitals:    /73   Pulse 56   Temp 97.7 °F (36.5 °C) (Oral)   Resp 18   Ht 5' 11\" (1.803 m)   Wt 232 lb 12.8 oz (105.6 kg)   SpO2 94%   BMI 32.47 kg/m²     LABS:   Recent Labs     04/22/22  1637 04/23/22  0655   WBC 7.4 10.6   HGB 11.7* 12.7*   HCT 37.7* 39.9*    188     Recent Labs     04/25/22  0727      K 5.0      CO2 24   BUN 27*   CREATININE 0.8     Recent Labs     04/22/22  1637   PROT 6.3*         VASCULAR: DP and PT pulses nonpalpable 0/4 b/l. Upon handheld doppler examination, DP and PT pulses were noted to have biphasic signals b/l. CFT is brisk to the digits of the foot b/l. Skin temperature is warm to wark from proximal to distal with no focal calor noted. +2 pitting edema noted to dorsal aspect of b/l forefoot. No pain with calf compression b/l. NEUROLOGIC: Gross and epicritic sensation is intact b/l. Protective sensation is diminished at all pedal sites b/l. DERMATOLOGIC: Chronic dermatologic changes noted to b/l LE. Right lower extremity:  Full-thickness ulceration noted to the anterior lateral aspect of the right leg. Wound base is a combination of granular, fibrotic tissue. No periwound erythema. No purulent drainage present. No malodor noted. No fluctuance or crepitus noted. Images from 4/25. Sloughing, necrotic tissue overlying the dorsal lateral aspect of the right hallux at the lateral aspect of the nail fold. Partial-thickness ulceration noted underlying sloughed skin. No purulent drainage expressed. No malodor noted. No fluctuance or crepitus noted. Wound does not probe to bone, tunnel, track. Superficial ulceration noted to the dorsal medial aspect of the right fifth digit. No acute signs of infection noted. Left lower extremity:  Full-thickness ulceration noted to the left fifth metatarsal head measuring approximately 1 cm x 0.5 cm x 0.2 cm. Superficial ulceration with skin sloughing noted to periwound area. Wound does not probe to bone, tunnel, track. No surrounding erythema noted. No purulent drainage expressed. No fluctuance or crepitus noted. No malodor noted. MUSCULOSKELETAL: Muscle strength is 4/5 for all pedal groups tested. Mild tenderness noted with palpation of periwound areas. Ankle joint ROM is decreased in dorsiflexion with the knee extended. No obvious biomechanical abnormalities. IMPRESSION/RECOMMENDATIONS:    -Full-thickness ulceration; right leg; Bueno 2  -Full-thickness ulceration; left foot; Bueno 2  -Partial-thickness ulceration; right hallux; Bueno 1  -Superficial-thickness ulceration; right fifth digit; Bueno 1  -Lower extremity edema; bilateral      -Patient examined and evaluated at bedside   -VSS, no leukocytosis noted  -CRP 12.7, ESR 34  -Wound culture not obtained 2/2 no acute drainage  -Blood culture; NGTD  -Using a #15 blade, I excisionally debrided the nonviable tissue of the left foot wound down to and including subcutaneous tissue. No bleeding noted. Patient tolerated well.   -Dressing applied to b/l LE consisting of Adaptic, gauze, Kerlix, Ace bandage  -No indication for antibiotics from podiatric standpoint  -Patient is weightbearing as tolerated to bilateral lower extremities  -Prevalon boots reapplied, to be worn at all times when patient is lying/sitting.  -Patient to follow-up with Dr. Nick Munoz in the [facility] wound care center within 1 week of discharge. Dispo: Multiple ulcerations; bilateral lower extremities. No acute signs of infection noted. No further inpatient work-up indicated at this time, recommend outpatient follow-up within 1 week of discharge at the [facility] wound care clinic with Dr. Bo Solorio DPM.    Discussed assessment plan with ANGELINA Aj DPM  Podiatric Resident, PGY-2  Pager #: [phone] or Perfect Serve     Patient was seen and evaluated at bedside. Agree with residents assessment and treatment plan.   Bo Solorio DPM

## 2022-04-25 NOTE — CARE COORDINATION
CM following pt from [address]: [address] [facility] [employer]  Phone: [phone] (300 floor) Fax: [phone]  Plans to return at [facility], pending sensitivities for final abx recs at [place].   Electronically signed by Alejandra Sharma RN on 4/24/2022 at 3:39 PM   [phone]
medications/ co-pay costs: Yes    ADLS:  Current PT AM-PAC Score:   /24  Current OT AM-PAC Score:   /24      DISCHARGE Disposition: Assisted Living Facility: [facility] Phone: [phone] Fax: [phone]    LOC at discharge: Assisted living   Di Sweeney Completed: Yes    Notification completed in HENS/PAS?:  Not Applicable    IMM Completed:       Transportation:  Transportation PLAN for discharge: EMS transportation   Mode of Transport: Ambulance stretcher - BLS  Reason for medical transport: Bed confined: Meets the following criteria 1) unable to get out of bed without assistance or ambulate, 2) unable to safely sit up in a wheelchair, 3) unable to maintain erect seating position in a chair for time needed for transport  Name of Transport Company: [employer]    Phone: [phone]  Time of Transport: 4pm    Transport form completed: Yes        Additional CM Notes: Pt will return back to [address] [place] [address] with nurse [address] aware of pt return today. Nurse to call report  To [phone] and orders faxed to [phone]. Pt will DC on PO ABX    The Plan for Transition of Care is related to the following treatment goals of UTI (urinary tract infection) [N39.0]  Multiple drug resistant organism (MDRO) culture positive [Z16.24]  Urinary tract infection associated with indwelling urethral catheter[facility] [A61.699B, N39.0]    The Patient and/or patient representative Thao Ballesteros and his family were provided with a choice of provider and agrees with the discharge plan Yes    Freedom of choice list was provided with basic dialogue that supports the patient's individualized plan of care/goals and shares the quality data associated with the providers.  Yes    Care Transitions patient: No    Dyan Estes RN  [facility]  Case Management Department  Ph: [phone]  Fax: [phone]

## 2022-04-25 NOTE — DISCHARGE INSTR - COC
Continuity of Care Form    Patient Name: Adri Barr   :  1940  MRN:  [id number]    Admit date:  2022  Discharge date:  2022    Code Status Order: Full Code   Advance Directives:      Admitting Physician:  Joelle Petty MD  PCP: Merrick Isbell    Discharging Nurse: [facility] Unit/Room#: 3374/7451-89  Discharging Unit Phone Number: ***    Emergency Contact:   Extended Emergency Contact Information  Primary Emergency Contact: Zahraa Xiong54, Lai Burrell 14 Phone: [phone]  Relation: Child  Secondary Emergency Contact: [facility] Phone: [phone]  Work Phone: [phone]  Mobile Phone: [phone]  Relation: Child    Past Surgical History:  Past Surgical History:   Procedure Laterality Date    BACK SURGERY      lower lumbar    CORONARY ARTERY BYPASS GRAFT  2013    CABG x 5 (Dr Shante Burkett), svg to diag, om1 and 3, distal rca, kelly to lad.      CYSTOSCOPY N/A 1/10/2019    CYSTOSCOPY performed by Ameya Hernandez MD at [address] [place] [address] Right 2015    ORIF    LEG SURGERY Right 2021    RIGHT LOWER EXTREMITY ADVANCEMENT FLAP AND SPLIT THICKNESS SKIN GRAFT PLACEMENT; (WOUND- 10 CM X 5.5 CM; CLOSURE- 6 CM X 5.5 CM; SKIN GRAFT- 7.2 CM X 5 CM) performed by Felipe Mckeon MD at [place] [address] N/A 2020    [address] [place] [address] performed by Geni Latham MD at [address] [place] [address] 2020    EGD BIOPSY performed by Geni Latham MD at [facility] ENDOSCOPY       Immunization History:   Immunization History   Administered Date(s) Administered    COVID-19, Aida Russell, Primary or Immunocompromised, PF, 100mcg/0.5mL 2021    Influenza A (R3V8-76) Vaccine PF IM 12/10/2009    Influenza Vaccine, unspecified formulation 2012, 2014, 10/13/2015, 2016, 2017, 10/26/2018, 2019    Influenza Virus Vaccine 2005, 2006, 2012, T83.511A, N39.0    Encephalopathy acute G93.40    Altered mental status R41.82    Hyperkalemia E87.5    Leg laceration, unspecified laterality, initial encounter S81.819A    Pain of right lower extremity M79.604    Degloving injury T14. 8XXA    Weakness R53.1    Symptomatic bradycardia R00.1    Bilateral leg edema R60.0    Symptomatic sinus bradycardia R00.1    UTI (urinary tract infection) N39.0    Multiple drug resistant organism (MDRO) culture positive Z16.24       Isolation/Infection:   Isolation            Contact  Contact  C Diff Contact          Patient Infection Status       Infection Onset Added Last Indicated Last Indicated By Review Planned Expiration Resolved Resolved By    MDRO (multi-drug resistant organism) 22 Culture, Urine        C-diff (Clostridium difficile) 22 C. difficile toxin Molecular 22       Resolved    C-diff Rule Out 22 C. difficile toxin Molecular (Ordered)   22 Rule-Out Test Resulted    C-diff Rule Out 22 Clostridium Difficile Toxin/Antigen (Ordered)   22 Rule-Out Test Resulted    INFLUENZA 22 Rapid influenza A/B antigens   22     COVID-19 (Rule Out) 22 COVID-19, Rapid (Ordered)   22 Rule-Out Test Resulted    COVID-19 (Rule Out) 22 COVID-19, Rapid (Ordered)   22 Rule-Out Test Resulted    COVID-19 (Rule Out) 22 COVID-19, Rapid (Ordered)   22 Rule-Out Test Resulted    MDRO (multi-drug resistant organism) 21 Culture, Urine   21 Saran Correa, RN    New urine cx on 2021 with no growth/was negative    COVID-19 (Rule Out) 04/21 COVID-19, Rapid (Ordered)   21 Rule-Out Test Resulted    C-diff Rule Out 21 GI Bacterial Pathogens By PCR (Ordered)   21 Saran Webb Post, RN    Order noted to be discontinued by MD; not included in GI pathogens order    COVID-19 (Rule Out) 02/19/21 02/19/21 02/19/21 COVID-19, Rapid (Ordered)   02/19/21 Rule-Out Test Resulted    C-diff Rule Out 12/13/20 12/13/20 12/13/20 Clostridium difficile toxin/antigen (Ordered)   02/16/21 Linda Gold RN    No stool ever sent.      COVID-19 (Rule Out) 06/10/20 06/10/20 06/10/20 COVID-19 (Ordered)   06/10/20 Rule-Out Test Resulted    C-diff Rule Out 06/09/20 06/09/20 06/09/20 GI Bacterial Pathogens By PCR (Ordered)   06/10/20 Rule-Out Test Resulted    C-diff Rule Out 06/09/20 06/09/20 06/09/20 Clostridium difficile toxin/antigen (Ordered)   06/09/20 Rule-Out Test Resulted    C-diff Rule Out 05/03/20 05/04/20 05/04/20 C. difficile toxin Molecular (Ordered)   05/05/20 Rule-Out Test Resulted    MDRO (multi-drug resistant organism) 05/02/20 05/04/20 05/02/20 Culture, Urine   04/22/21 Bridget Correa RN    Has had new urine culture on 4/17/21 with mixed anne; no MDRO noted    COVID-19 (Rule Out) 05/03/20 05/03/20 05/03/20 COVID-19 (Ordered)   05/03/20 Rule-Out Test Resulted    C-diff Rule Out 05/02/20 05/02/20 05/03/20 Clostridium difficile toxin/antigen (Ordered)   05/03/20 Rule-Out Test Resulted    MRSA 02/07/20 02/08/20 02/07/20 MRSA DNA Probe, Nasal   05/04/20 Jamal Hinkle    MDRO (multi-drug resistant organism) 10/26/19 10/28/19 11/19/19 Urine culture   01/16/20 Jamal Hinkle            Nurse Assessment:  Last Vital Signs: BP (!) 144/83   Pulse 58   Temp 97.8 °F (36.6 °C) (Axillary)   Resp 18   Ht 5' 11\" (1.803 m)   Wt 232 lb 12.8 oz (105.6 kg)   SpO2 94%   BMI 32.47 kg/m²     Last documented pain score (0-10 scale): Pain Level: 6  Last Weight:   Wt Readings from Last 1 Encounters:   04/22/22 232 lb 12.8 oz (105.6 kg)     Mental Status:  oriented and alert    IV Access:  - None    Nursing Mobility/ADLs:  Walking   Dependent  Transfer  Dependent  Bathing  Assisted  Dressing Assisted  Toileting  Dependent  Feeding  Independent  Med Admin  Independent  Med Delivery   whole    Wound Care Documentation and Therapy:  Negative Pressure Wound Therapy Leg Anterior; Lower;Right (Active)   Number of days: 174       Wound 11/01/21 Buttocks Right;Left redness, open areas, stage 2 ulcer (Active)   Number of days: 175       Wound 01/27/22 Sacrum Inner;Medial;Left;Right (Active)   Number of days: 88       Wound 01/27/22 Pretibial Right (Active)   Number of days: 88       Wound 01/27/22 Elbow Right;Posterior (Active)   Number of days: 88       Incision 11/02/21 Thigh Anterior;Right (Active)   Number of days: 174        Elimination:  Continence: Bowel: No  Bladder: No  Urinary Catheter: Insertion Date: 4/23/2022    Colostomy/Ileostomy/Ileal Conduit: No       Date of Last BM: 4/23/2022      Intake/Output Summary (Last 24 hours) at 4/25/2022 1011  Last data filed at 4/25/2022 0345  Gross per 24 hour   Intake 720 ml   Output 2575 ml   Net -1855 ml     I/O last 3 completed shifts: In: 36 [P.O.:1320]  Out: 3250 [Urine:3250]    Safety Concerns: At Risk for Falls    Impairments/Disabilities:      None    Nutrition Therapy:  Current Nutrition Therapy:   - Oral Diet:  General    Routes of Feeding: Oral  Liquids: No Restrictions  Daily Fluid Restriction: no  Last Modified Barium Swallow with Video (Video Swallowing Test): not done    Treatments at the Time of Hospital Discharge:   Respiratory Treatments: ***  Oxygen Therapy:  {Therapy; copd oxygen:57773}  Ventilator:    {MH CC Vent PFPP:684998441}    Heart Failure Instructions for Daily Management  Patient was treated for chronic diastolic heart failure. he  will require the following:    Please weigh daily on the same scale and approximately the same time of day. Report weight gain of 3 pounds/day or 5 pounds/week to : josephine CARREON, INES TSANG None, and [address] Sirena Quinn / Troy Woodall [phone].   Please use hospital discharge weight as baseline reference. Please monitor for signs and symptoms of and report to MD:  Worsening Heart Failure: sudden weight gain, shortness of breath, lower extremity or general edema/swelling, abdominal bloating/swelling, inability to lie flat, intolerance to usual activity, or cough (especially at night). Report these finding even if no increase in weight. Dehydration:  having difficulty or a decrease in urination, dizziness, worsening fatigue, or new onset/worsening of generalized weakness. Please continue a LOW SODIUM diet and LIMIT fluid intake to 48 - 64 ounces ( 1.5 - 2 liters) per day. Call Tameka Peralta [address], facility MD, and [facility] [phone] with any questions or concerns. Please continue heart failure education to patient and family/support system. See After Visit Summary for hospital follow up appointment details. Consider spiritual care referral for support and/or completion of advance directives . Consider: having the facility MD complete required 7 day follow up, [place] [address] telehealth program if patient agreeable and able to participate, and palliative care consult for ongoing goals of care, end of life, and/or chronic disease management discussions. Patient's primary cardiologist is Dr Holland Sheldon.       Rehab Therapies: {THERAPEUTIC INTERVENTION:5913565921}  Weight Bearing Status/Restrictions: [facility] Weight Bearin}  Other Medical Equipment (for information only, NOT a DME order):  {EQUIPMENT:824138196}  Other Treatments: ***    Patient's personal belongings (please select all that are sent with patient):  {CHP DME Belongings:781197180}    RN SIGNATURE:  {Esignature:[id number]}    CASE MANAGEMENT/SOCIAL WORK SECTION    Inpatient Status Date: ***    Readmission Risk Assessment Score:  Readmission Risk              Risk of Unplanned Readmission:  40           Discharging to Facility/ Agency   Name: [address] [place] [address] Address: Valerie Yancey, [facility]  Phone: [phone]  Fax: [phone]      / signature: Electronically signed by Danyelle Garg RN on 4/25/22 at 10:58 AM EDT    PHYSICIAN SECTION    Prognosis: Good    Condition at Discharge: Stable    Rehab Potential (if transferring to Rehab): Fair    Recommended Labs or Other Treatments After Discharge:     Wound care and schneider catheter care. Okay to repeat urinalysis and urine culture if dysuria persist after course of antibiotics. Monthly schneider catheter exchange    Podiatry Wound Care Discharge Instructions  Please perform every other day dressing changes to bilateral lower extremity as follows  -Apply adaptic to the wound on the right leg, and the wound of the left foot. -Next apply gauze over the wounds of the right leg, right foot, and left foot. -Next apply gauze to the top of the front of the bilateral foot, ankle, and leg  -Next loosely wrap the bilateral lower extremity with Kerlix starting from just in front of the toes and ending just below the knee  -Next gently wrap the bilateral foot with 4 inch Ace bandage starting from just in front of the toes and ending just above the ankle  -Next gently wrap the bilateral leg with 6 inch Ace bandage starting from just above the ankle and ending just below the right knee    Please use Prevalon boots to offload bilateral heels when lying/sitting at all times. Patient is weightbearing as tolerated Bilateral lower extremity  PLEASE CALL TO SCHEDULE FOLLOW-UP WITH DR. Lenora Baker DPM FOR WOUND CHECK IN [place] [address] IN 1 WEEK (5/3/2022). Physician Certification: I certify the above information and transfer of William Graham  is necessary for the continuing treatment of the diagnosis listed and that he requires Assisted Living for greater 30 days.      Update Admission H&P: No change in H&P    PHYSICIAN SIGNATURE:  Electronically signed by Kolton Ruano Jay Landeros MD on 4/25/22 at 10:12 AM EDT

## 2022-04-25 NOTE — PROGRESS NOTES
Occupational Therapy  Facility/Department: [address] [place] [address]  Occupational Therapy Initial Assessment ,Tx & DC    Name: Sheldon Gore  : 1940  MRN: [id number]  Date of Service: 2022    Discharge Recommendations: Sheldon Gore scored a  on the AM-PAC ADL Inpatient form. Current research shows that an AM-PAC score of 17 or less is typically not associated with a discharge to the patient's home setting. Based on the patient's AM-PAC score and their current ADL deficits, it is recommended that the patient have  Please see assessment section for further patient specific details. If patient discharges prior to next session this note will serve as a discharge summary. Please see below for the latest assessment towards goals. OT Equipment Recommendations  Equipment Needed: No       Patient Diagnosis(es): The primary encounter diagnosis was Multiple drug resistant organism (MDRO) culture positive. A diagnosis of Urinary tract infection associated with indwelling urethral catheter, initial encounter [facility]) was also pertinent to this visit. Past Medical History:  has a past medical history of BPH (benign prostatic hyperplasia), Carotid stenosis, Cellulitis, CHF (congestive heart failure) ([address] Chronic back pain, Diverticular disease, GERD (gastroesophageal reflux disease), History of atrial fibrillation, Hypercholesteremia, Hypertension, Lower GI bleed, MDRO (multiple drug resistant organisms) resistance, Neuromuscular disorder ([address] Renal insufficiency, and Vitamin B12 deficiency. Past Surgical History:  has a past surgical history that includes back surgery (); Foot surgery; Coronary artery bypass graft (2013); hip surgery (Right, 2015); Cystoscopy (N/A, 1/10/2019); Upper gastrointestinal endoscopy (N/A, 2020); sigmoidoscopy (N/A, 2020); and Leg Surgery (Right, 2021).     Treatment Diagnosis: impaired mobility, transfers, ADL      Assessment   Performance deficits / Impairments: Decreased functional mobility ; Decreased ADL status; Decreased ROM; Decreased strength;Decreased endurance;Decreased balance;Decreased posture  Assessment: From AL facility, reporting uses [facility] for OOB however stating spends most days in bed. Assist with all mobility and ADLs. Pt planning to rtn to facility. Pt completing bed mobility with MaxAx2 to dependent. Evelyn for sit balance EOB completing LB exercise and UE ADLs. Pt fatigues quickly. Pt plans to return to assisted living with 24hr assist upon d/c. Recommend return to assisted living facility vs LTC with continued use of [facility] lift. No skilled OT needs at this time due to pt being at baseline, defer any therapy needs to next level of care. Treatment Diagnosis: impaired mobility, transfers, ADL  Decision Making: Medium Complexity  REQUIRES OT FOLLOW-UP: No  Activity Tolerance  Activity Tolerance: Patient limited by fatigue        Plan   Plan  Times per Week: 2-5  Times per Day: Daily     Restrictions  Position Activity Restriction  Other position/activity restrictions: up as tolerated, up with assist    Subjective   General  Chart Reviewed: Yes  Patient assessed for rehabilitation services?: Yes  Additional Pertinent Hx: 50-year-old male with past medical history of chronic indwelling Iglesias complicated by recurrent UTIs presented to hospital with abdominal pain. Patient lives at an assisted living facility and a urine culture over the grew multidrug-resistant Proteus. Due to his abdominal pain he was transferred to the hospital.  Referring Practitioner: Whitney Mcardle, MD  Diagnosis: UTI  Subjective  Subjective:  In bed agreeable to session, reporting pain in BLE  Vital Signs  Temp: 97.7 °F (36.5 °C)  Temp Source: Oral  Pulse: 56  Heart Rate Source: Monitor  Resp: 18  BP: 137/73  BP Location: Right upper arm  MAP (Calculated): 94.33  Patient Position: Semi fowlers  Level of Consciousness: Alert (0)  MEWS Score: 1  Oxygen Therapy  SpO2: 94 %  Pulse Oximeter Device Mode: Intermittent  Pulse Oximeter Device Location: Finger  O2 Device: None (Room air)  Social/Functional History  Social/Functional History  Lives With: Alone  Type of Home: Assisted living ([address])  Home Equipment: Wheelchair-manual,Hospital bed,Alert Button  Has the patient had two or more falls in the past year or any fall with injury in the past year?: No (one fall, led to hospitalization)  ADL Assistance: Needs assistance (assistance bathing, able to dress himself but gets help because it takes too long)  Homemaking Assistance: Needs assistance (meals brought to room, staff cleans)  Ambulation Assistance: Non-ambulatory  Transfer Assistance: Needs assistance ([facility])  Active : No  Patient's  Info: facility arranged Transportation service  Leisure & Hobbies: denies  Additional Comments: [facility] for transfers       Objective           Observation/Palpation  Posture: Poor (significant kyphosis sitting EOB, heavy cues for upright posture)  Edema: dorsal R foot edema  Safety Devices  Type of Devices: Bed alarm in place;Gait belt;Left in bed;Nurse notified;Call light within reach; All fall risk precautions in place  Restraints  Restraints Initially in Place: No  Balance  Sitting Balance: Minimal assistance (to CGA)  AROM: Generally decreased, functional  Strength: Generally decreased, functional  ADL  Feeding: Setup;Stand by assistance  Grooming: Setup;Stand by assistance (oral care seated EOB + Evelyn to CGA sit balance)  UE Dressing: Minimal assistance  LE Dressing: Dependent/Total  Toileting:  (cath donned)     Activity Tolerance  Activity Tolerance: Patient limited by fatigue  Bed mobility  Rolling to Left: Maximum assistance  Supine to Sit: 2 Person assistance;Maximum assistance  Sit to Supine: Dependent/Total;2 Person assistance  Scooting: Dependent/Total  Transfers  Sit to stand: Unable to assess  Stand to sit: Unable to assess  Transfer Comments: unsafe to attempt OOB at this time 2/2 weakness     Cognition  Overall Cognitive Status: WNL                  Education Given To: Patient  Education Provided: Role of Therapy;Plan of Care;ADL Adaptive Strategies  Education Method: Demonstration;Verbal  Barriers to Learning: None  Education Outcome: Verbalized understanding                        AM-PAC Score        AM-PAC Inpatient Daily Activity Raw Score: 11 (04/25/22 1233)  AM-PAC Inpatient ADL T-Scale Score : 29.04 (04/25/22 1233)  ADL Inpatient CMS 0-100% Score: 70.42 (04/25/22 1233)  ADL Inpatient CMS G-Code Modifier : CL (04/25/22 1233)      Therapy Time   Individual Concurrent Group Co-treatment   Time In 1055         Time Out 1133         Minutes 38              Timed Code Treatment Minutes:  23  Total Treatment Minutes:  [address], OT

## 2022-04-25 NOTE — CONSULTS
Consulted for wounds on bilateral feet and R Lower Leg - Podiatry to evaluate and treat, also consulted on skin donor site on R Thigh and traumatic wound on R Upper Thigh. R Thigh cleaned with NS, dried, petroleum gauze, abd, paper tape. R Upper Thigh - clean with NS, dried, covered with foam dressing. Pt tolerated well.      R Thigh:      R Upper Thigh:

## 2022-04-26 LAB — BLOOD CULTURE, ROUTINE: NORMAL

## 2022-04-27 NOTE — DISCHARGE SUMMARY
Hospital Medicine Discharge Summary      Patient ID: Sadi Li      Patient's PCP: Rosana Nunez    Admit Date: 4/22/2022     Discharge Date: 4/25/2022      Admitting Physician: Nguyễn Wise MD    Discharge Physician: Nivia Diaz MD     Discharge Diagnoses: Active Hospital Problems    Diagnosis Date Noted    Multiple drug resistant organism (MDRO) culture positive [Z16.24] 04/25/2022     Priority: Medium    UTI (urinary tract infection) [N39.0] 04/22/2022     Priority: Medium         The patient was seen and examined on day of discharge and this discharge summary is in conjunction with any daily progress note from day of discharge. Hospital Course:     Patient is an 36-year-old male with medical history of urinary retention, with chronic indwelling Iglesias, also CHF, GERD, atrial fibrillation, chronically bedbound who presented with painful urination and suprapubic pain. he was found to have urinary tract infection. Urine culture grew Proteus penneri. Patient was treated with 3 days of intravenous cefepime, switched to Augmentin on discharge to finish the course per sensitivities. Upon review of his most recent testing, he tested positive for C. difficile on 4/12. It was unclear if patient was treated for that infection, he was not having diarrhea while in the hospital.  We prescribed oral vancomycin along with systemic antibiotics for UTI. he was afebrile and hemodynamically stable at the time of discharge. Consults:     IP CONSULT TO HOSPITALIST  IP CONSULT TO PODIATRY      Disposition: Assisted living facility    Discharged Condition: Stable    Code Status: Prior    Activity: activity as tolerated    Diet: cardiac diet    Follow Up: Primary Care Physician in one week    Exam:     General appearance: No apparent distress, appears stated age and cooperative. Lungs: Clear to ascultation, bilaterally without Rales/Wheezes/Rhonchi with good respiratory effort.   Heart: Regular rate and rhythm with Normal S1/S2 without  murmurs, rubs or gallops, point of maximum impulse non-displaced  Abdomen: Soft, non-tender or non-distended without rigidity or guarding and positive bowel sounds all four quadrants. Extremities: No clubbing, cyanosis, or edema bilaterally. Skin: Chronic skin wounds lower extremities  Neurologic: Alert and oriented X 3, generalized leg weakness  Mental status: Alert, oriented, thought content appropriate      Labs: For convenience and continuity at follow-up the following most recent labs are provided:    CBC:   Lab Results   Component Value Date    WBC 10.6 04/23/2022    HGB 12.7 04/23/2022    HCT 39.9 04/23/2022     04/23/2022       RENAL:   Lab Results   Component Value Date     04/25/2022    K 5.0 04/25/2022    K 4.6 04/23/2022     04/25/2022    CO2 24 04/25/2022    BUN 27 04/25/2022    CREATININE 0.8 04/25/2022           Discharge Medications:   Discharge Medication List as of 4/25/2022  2:50 PM           Details   amoxicillin-clavulanate (AUGMENTIN) 875-125 MG per tablet Take 1 tablet by mouth 2 times daily for 7 days, Disp-14 tablet, R-0Print      vancomycin (VANCOCIN) 50 mg/mL oral solution Take 2.5 mLs by mouth every 6 hours for 10 days, Disp-100 mL, R-0Print              Details   Nutritional Supplements (RONEY PO) Take 1 packet by mouth 2 times dailyHistorical Med      Nutritional Supplements (ENSURE HIGH PROTEIN) LIQD Take 1 Can by mouth 2 times dailyHistorical Med      Wound Dressings (MEPILEX EX) Apply topically Cleanse skin graft areas with normal saline, pat dry, apply mepilex foam board, change every 3 days as needed. Historical Med      traZODone (DESYREL) 50 MG tablet Take 25 mg by mouth nightlyHistorical Med      loperamide (IMODIUM) 2 MG capsule Take 2 mg by mouth 4 times daily as needed for DiarrheaHistorical Med      Melatonin 5 MG CAPS Take 1 capsule by mouth nightlyHistorical Med      levothyroxine (SYNTHROID) 75 MCG tablet Take 1 tablet by mouth Daily, Disp-30 tablet, R-3Normal      albuterol sulfate HFA (PROVENTIL HFA) 108 (90 Base) MCG/ACT inhaler Inhale 2 puffs into the lungs every 6 hours as needed for Wheezing or Shortness of Breath, Disp-18 g, R-3Normal      guaiFENesin (MUCINEX) 600 MG extended release tablet Take 1 tablet by mouth 2 times daily as needed for Congestion, Disp-60 tablet, R-2Normal      ferrous sulfate (IRON 325) 325 (65 Fe) MG tablet Take 1 tablet by mouth 2 times daily, Disp-180 tablet, R-1Normal      Balsam [place]-Castor Oil (VENELEX) OINT ointment Apply topically daily as needed, Topical, DAILY PRN, Historical Med      docusate sodium (COLACE) 100 MG capsule Take 100 mg by mouth daily as needed for ConstipationHistorical Med      gabapentin (NEURONTIN) 600 MG tablet Take 600 mg by mouth 2 times daily. Historical Med      dicyclomine (BENTYL) 20 MG tablet Take 20 mg by mouth 3 times daily as neededHistorical Med      vitamin B-12 (CYANOCOBALAMIN) 1000 MCG tablet Take 1,000 mcg by mouth dailyHistorical Med      aspirin 81 MG chewable tablet Take 1 tablet by mouth daily, Disp-30 tablet, R-3Normal      pantoprazole (PROTONIX) 40 MG tablet Take 1 tablet by mouth every morning (before breakfast), Disp-30 tablet, R-3Normal      tamsulosin (FLOMAX) 0.4 MG capsule Take 0.8 mg by mouth daily Historical Med      atorvastatin (LIPITOR) 80 MG tablet Take 80 mg by mouth nightly. Historical Med                Time Spent on discharge is more than 30 minutes in the examination, evaluation, counseling and review of medications and discharge plan. Signed:  Nivia Diaz MD   4/27/2022      Thank you INES Gonzalez for the opportunity to be involved in this patient's care. If you have any questions or concerns please feel free to contact me at [phone].

## 2022-05-19 ENCOUNTER — OFFICE VISIT (OUTPATIENT)
Dept: CARDIOLOGY CLINIC | Age: 82
End: 2022-05-19
Payer: MEDICARE

## 2022-05-19 VITALS
HEART RATE: 82 BPM | DIASTOLIC BLOOD PRESSURE: 60 MMHG | BODY MASS INDEX: 32.47 KG/M2 | HEIGHT: 71 IN | SYSTOLIC BLOOD PRESSURE: 116 MMHG

## 2022-05-19 DIAGNOSIS — I47.1 ATRIAL TACHYCARDIA (HCC): ICD-10-CM

## 2022-05-19 DIAGNOSIS — R00.1 BRADYCARDIA: Primary | ICD-10-CM

## 2022-05-19 DIAGNOSIS — I10 ESSENTIAL HYPERTENSION: ICD-10-CM

## 2022-05-19 DIAGNOSIS — R42 DIZZINESS: ICD-10-CM

## 2022-05-19 PROCEDURE — 1111F DSCHRG MED/CURRENT MED MERGE: CPT | Performed by: NURSE PRACTITIONER

## 2022-05-19 PROCEDURE — 99214 OFFICE O/P EST MOD 30 MIN: CPT | Performed by: NURSE PRACTITIONER

## 2022-05-19 PROCEDURE — 93000 ELECTROCARDIOGRAM COMPLETE: CPT | Performed by: NURSE PRACTITIONER

## 2022-05-19 PROCEDURE — 1036F TOBACCO NON-USER: CPT | Performed by: NURSE PRACTITIONER

## 2022-05-19 PROCEDURE — 4040F PNEUMOC VAC/ADMIN/RCVD: CPT | Performed by: NURSE PRACTITIONER

## 2022-05-19 PROCEDURE — G8427 DOCREV CUR MEDS BY ELIG CLIN: HCPCS | Performed by: NURSE PRACTITIONER

## 2022-05-19 PROCEDURE — 1123F ACP DISCUSS/DSCN MKR DOCD: CPT | Performed by: NURSE PRACTITIONER

## 2022-05-19 PROCEDURE — G8417 CALC BMI ABV UP PARAM F/U: HCPCS | Performed by: NURSE PRACTITIONER

## 2022-05-19 NOTE — PATIENT INSTRUCTIONS
Patient Education        Low Sodium Diet (2,000 Milligram): Care Instructions  Overview     Limiting sodium can be an important part of managing some health problems. The most common source of sodium is salt. People get most of the salt in their diet from canned, prepared, and packaged foods. Fast food and restaurant meals also are very high in sodium. Your doctor will probably limit your sodium to less than 2,000 milligrams (mg) a day. This limit counts all the sodium inprepared and packaged foods and any salt you add to your food. Follow-up care is a key part of your treatment and safety. Be sure to make and go to all appointments, and call your doctor if you are having problems. It's also a good idea to know your test results and keep alist of the medicines you take. How can you care for yourself at home? Read food labels   Read labels on cans and food packages. The labels tell you how much sodium is in each serving. Make sure that you look at the serving size. If you eat more than the serving size, you have eaten more sodium.  Food labels also tell you the Percent Daily Value for sodium. Choose products with low Percent Daily Values for sodium.  Be aware that sodium can come in forms other than salt, including monosodium glutamate (MSG), sodium citrate, and sodium bicarbonate (baking soda). MSG is often added to Asian food. When you eat out, you can sometimes ask for food without MSG or added salt. Buy low-sodium foods   Buy foods that are labeled \"unsalted\" (no salt added), \"sodium-free\" (less than 5 mg of sodium per serving), or \"low-sodium\" (140 mg or less of sodium per serving). Foods labeled \"reduced-sodium\" and \"light sodium\" may still have too much sodium. Be sure to read the label to see how much sodium you are getting.  Buy fresh vegetables, or frozen vegetables without added sauces. Buy low-sodium versions of canned vegetables, soups, and other canned goods.   Prepare low-sodium meals   Cut back on the amount of salt you use in cooking. This will help you adjust to the taste. Do not add salt after cooking. One teaspoon of salt has about 2,300 mg of sodium.  Take the salt shaker off the table.  Flavor your food with garlic, lemon juice, onion, vinegar, herbs, and spices. Do not use soy sauce, lite soy sauce, steak sauce, onion salt, garlic salt, celery salt, or ketchup on your food.  Use low-sodium salad dressings, sauces, and ketchup. Or make your own salad dressings and sauces without adding salt.  Use less salt (or none) when recipes call for it. You can often use half the salt a recipe calls for without losing flavor. Other foods such as rice, pasta, and grains do not need added salt.  Rinse canned vegetables, and cook them in fresh water. This removes somebut not allof the salt.  Avoid water that is naturally high in sodium or that has been treated with water softeners, which add sodium. If you buy bottled water, read the label and choose a sodium-free brand. Avoid high-sodium foods   Avoid eating:  ? Smoked, cured, salted, and canned meat, fish, and poultry. ? Ham, lundberg, hot dogs, and luncheon meats. ? Regular, hard, and processed cheese and regular peanut butter. ? Crackers with salted tops, and other salted snack foods such as pretzels, chips, and salted popcorn. ? Frozen prepared meals, unless labeled low-sodium. ? Canned and dried soups, broths, and bouillon, unless labeled sodium-free or low-sodium. ? Canned vegetables, unless labeled sodium-free or low-sodium. ? [place] fries, pizza, tacos, and other fast foods. ? Pickles, olives, ketchup, and other condiments, especially soy sauce, unless labeled sodium-free or low-sodium. Where can you learn more? Go to https://kiesha.[email or web address] org and sign in to your [employer] account. Enter K316 in the [facility] box to learn more about \"Low Sodium Diet (2,000 Milligram): Care Instructions. \" If you do not have an account, please click on the \"Sign Up Now\" link. Current as of: September 8, 2021               Content Version: 13.2  © 1839-7877 [employer]. Care instructions adapted under license by [facility] ([facility]). If you have questions about a medical condition or this instruction, always ask your healthcare professional. [address] any warranty or liability for your use of this information.

## 2022-05-19 NOTE — PROGRESS NOTES
[address]   Electrophysiology  Office Visit  Date: 5/19/2022    Chief Complaint   Patient presents with    Bradycardia    Dizziness    Tachycardia    Hypertension       Cardiac HX: Marianna Benites is a [de-identified] y.o. man w/ PMH HTN, HLD, GERD, carotid stenosis, CAD, s/p CABG (2013), MPI (2020), follows with Dr. Idania Gamino, who presented (1/18/22) to the hospital with fatigue, weakness, fall, EKG significant for SB with HR 46, HR was noted to be in the 30s overnight with a single pause lasting 2.5 seconds, patient hypothermic, HR improved w/ kole ramos, who p/w a fall (1/26/22), found to be bradycardia, hypothermic, + flu    Interval History/HPI: Patient is here for follow-up for bradycardia, AT. Patient has had multiple hospitalizations over the past year. Most recently he was in the hospital ([address] [place] [address]) for UTI, treated with IV antibiotics and discharged home. He has had no further identifiable episodes of bradycardia. Presents today in NSR. Pt denies palpitations, heart racing, dizziness, lightheadedness. No CP, SOB, PND, orthopnea, BLE edema. BP well controlled. Resides in [facility]. Home medications:   Current Outpatient Medications on File Prior to Visit   Medication Sig Dispense Refill    Nutritional Supplements (RONEY PO) Take 1 packet by mouth 2 times daily      Nutritional Supplements (ENSURE HIGH PROTEIN) LIQD Take 1 Can by mouth 2 times daily      Wound Dressings (MEPILEX EX) Apply topically Cleanse skin graft areas with normal saline, pat dry, apply mepilex foam board, change every 3 days as needed.       traZODone (DESYREL) 50 MG tablet Take 25 mg by mouth nightly      loperamide (IMODIUM) 2 MG capsule Take 2 mg by mouth 4 times daily as needed for Diarrhea      Melatonin 5 MG CAPS Take 1 capsule by mouth nightly      levothyroxine (SYNTHROID) 75 MCG tablet Take 1 tablet by mouth Daily 30 tablet 3    albuterol sulfate HFA (PROVENTIL HFA) 108 (90 Base) MCG/ACT inhaler Inhale 2 puffs into the lungs every 6 hours as needed for Wheezing or Shortness of Breath 18 g 3    guaiFENesin (MUCINEX) 600 MG extended release tablet Take 1 tablet by mouth 2 times daily as needed for Congestion 60 tablet 2    Balsam [place]-Castor Oil (VENELEX) OINT ointment Apply topically daily as needed      docusate sodium (COLACE) 100 MG capsule Take 100 mg by mouth daily as needed for Constipation      gabapentin (NEURONTIN) 600 MG tablet Take 600 mg by mouth 2 times daily.  dicyclomine (BENTYL) 20 MG tablet Take 20 mg by mouth 3 times daily as needed      vitamin B-12 (CYANOCOBALAMIN) 1000 MCG tablet Take 1,000 mcg by mouth daily      aspirin 81 MG chewable tablet Take 1 tablet by mouth daily 30 tablet 3    pantoprazole (PROTONIX) 40 MG tablet Take 1 tablet by mouth every morning (before breakfast) 30 tablet 3    tamsulosin (FLOMAX) 0.4 MG capsule Take 0.8 mg by mouth daily       atorvastatin (LIPITOR) 80 MG tablet Take 80 mg by mouth nightly.  ferrous sulfate (IRON 325) 325 (65 Fe) MG tablet Take 1 tablet by mouth 2 times daily (Patient not taking: Reported on 5/19/2022) 180 tablet 1     No current facility-administered medications on file prior to visit.        Past Medical History:   Diagnosis Date    BPH (benign prostatic hyperplasia)     Carotid stenosis 12/2013    JESU 42-38% stenosis; LICA 54-40% stenosis    Cellulitis 12/2013    LLE    CH[facility]estive [facility] failure) [facility])     Chronic back pain     Diverticular disease     GERD (gastroesophageal reflux disease)     History of atrial fibrillation     Hypercholesteremia     Hypertension     Lower GI bleed     MDRO (multiple drug resistant organisms) resistance 10/26/2019    urine    Neuromuscular disorder ([facility])     spasticity    Renal insufficiency     Vitamin B12 deficiency         Past Surgical History:   Procedure Laterality Date    BACK SURGERY  2006    lower lumbar    CORONARY ARTERY BYPASS GRAFT  12/13/2013    CABG x 5 (Dr Genesis Mendoza), svg to diag, om1 and 3, distal rca, kelly to lad.  CYSTOSCOPY N/A 1/10/2019    CYSTOSCOPY performed by Melinda Garcia MD at [facility] [address] Right 5/1/2015    ORIF    LEG SURGERY Right 11/2/2021    RIGHT LOWER EXTREMITY ADVANCEMENT FLAP AND SPLIT THICKNESS SKIN GRAFT PLACEMENT; (WOUND- 10 CM X 5.5 CM; CLOSURE- 6 CM X 5.5 CM; SKIN GRAFT- 7.2 CM X 5 CM) performed by Ronny Parnell MD at [address] [place] [address] Avenue 6/11/2020    [address] [facility] performed by Jorge Luis Sow MD at [address] 5/4/2020    EGD BIOPSY [facility] History:  Reviewed. family history includes Heart Disease in his father. Review of System:    · Constitutional: No fevers, chills. · Eyes: No visual changes or diplopia. No scleral icterus. · ENT: No Headaches. No mouth sores or sore throat. · Cardiovascular: No for chest pain, No for dyspnea on exertion, No for palpitations or No for loss of consciousness. No cough, hemoptysis, No for pleuritic pain, or phlebitis. · Respiratory: No for cough or wheezing. No hematemesis. · Gastrointestinal: No abdominal pain, blood in stools. · Genitourinary: No dysuria, or hematuria. · Musculoskeletal: Yes gait disturbance,    · Integumentary: No rash or pruritis. · Neurological: No headache, change in muscle strength, numbness or tingling. · Psychiatric: No anxiety, or depression. · Endocrine: No temperature intolerance. No excessive thirst, fluid intake, or urination. · Hem/Lymph: No abnormal bruising or bleeding, blood clots or swollen lymph nodes. · Allergic/Immunologic: No nasal congestion or hives.     Physical Examination:  Vitals:    05/19/22 0955   BP: 116/60   Pulse: 82         Wt Readings from Last 3 Encounters:   04/22/22 232 lb 12.8 oz (105.6 kg)   04/11/22 220 lb (99.8 kg)   02/05/22 205 lb 4 oz (93.1 kg) · Constitutional: Oriented. No distress. · Head: Normocephalic and atraumatic. · Mouth/Throat: Oropharynx is clear and moist.   · Eyes: Conjunctivae clear without jaunduice. PERRL. · Neck: Neck supple. No rigidity. No JVD present. · Cardiovascular: Normal rate, regular rhythm, S1&S2. · Pulmonary/Chest: Bilateral respiratory sounds. No wheezes, No rhonchi. · Abdominal: Soft. Bowel sounds present. No distension, No tenderness. · Musculoskeletal: No tenderness. No edema    · Lymphadenopathy: Has no cervical adenopathy. · Neurological: Alert and oriented. Cranial nerve appears intact, No Gross deficit   · Skin: Skin is warm and dry. No rash noted. · Psychiatric: Has a normal mood, affect and behavior     Labs  No results for input(s): NA, K, CL, CO2, PHOS, BUN, CREATININE, CA in the last 72 hours. Invalid input(s):  TSH  No results for input(s): WBC, HGB, HCT, MCV, PLT in the last 72 hours. Lab Results   Component Value Date    CKTOTAL 225 01/26/2022    TROPONINI 0.11 01/28/2022     No results found for: BNP  Lab Results   Component Value Date    PROTIME 11.0 10/31/2021    PROTIME 14.2 04/22/2021    PROTIME 14.1 02/14/2021    INR 0.97 10/31/2021    INR 1.22 04/22/2021    INR 1.21 02/14/2021     Lab Results   Component Value Date    CHOL 102 06/05/2021    HDL 43 06/05/2021    TRIG 54 06/05/2021       ECG: Personally reviewed: NSR, HR 82, , QRS 90, QTc 426    ECHO:  1.14.2020   Summary   Normal left ventricle size. There is mild concentric left ventricular   hypertrophy. Overall left ventricular systolic function appears normal with   an ejection fraction of 55-60%. No regional wall motion abnormalities are   noted. Diastolic filling parameters suggests normal diastolic function. Trace mitral and tricuspid regurgitation is present. Estimated pulmonary artery systolic pressure is 30 mmHg assuming a right   atrial pressure of 3 mmHg. Biatrial enlargement.     Stress Test: 8.18.2020  Summary    Very small and mild distal anteroseptal reversible defect (apex is    preserved), cannot exclude ischemia vs artifact.    Normal LV size and systolic function.    Left ventricular ejection fraction of 68 %.    There is severe hypokinesis / akinesis of the septal wall.    Overall findings represent a intermediate risk scan. Dr Maryellen Burden and Renita Brown NP have been notified. Cardiac Angiography: 8.19.2020 [facility])  Angiographic Findings:  Left Main: Normal  Left Anterior Descending: Has mid vessel plaque and areas of stenosis up to 60%. Circumflex: Has subtotal occlusion in the midportion. There is a vein graft to the distal circumflex vessels. Right Coronary: Is probably nondominant. Mid distal stenosis of 60 to 70%. Left Ventriculogram: Normal contractility and size with ejection fraction of 55 to 60%. EDP was 12  Right ileofemoral: Normal vessel. Our insertion site was at the superficial femoral therefore no closure device was added. Saphenous vein graft on the right was injected. It shows what appears to be quadruple skip graft. Is a large vessel with good flow to all the areas that it was attached to. We see distal right and we see distal circumflex and obtuse marginal x2  The DEANNA is a small atretic looking vessel. The anastomosis at the LAD with very limited and slow flow down the LAD. We do not see any opportunities for intervention. Hemodynamics:   Left Ventricular Pressure: 12  Central Aortic Pressure: 100  Assessment:  Patient with 5 vessel bypass in 4 of the grafts are in really great shape. The DEANNA is atretic into the LAD. We do not see any opportunities for intervention on this patient. The LV function is good with normal ejection fraction of 55 to 60%. Recommendations: We will continue his current therapy including anti-ischemic therapy. Optimize lipid management with LDL target of 60 or less. Blood pressure looks really good on current therapy. He is being evaluated by EP because of the bradycardia and pauses occurring in the stress lab which may have been related to the [facility]. I do not suspect that a pacemaker is necessary at this time as he has had good rhythm throughout the night. Probably outpatient monitoring would be appropriate for this patient. We will await results and recommendation from electrophysiology.     Problem List:   Patient Active Problem List    Diagnosis Date Noted    Multiple drug resistant organism (MDRO) culture positive 04/25/2022    UTI (urinary tract infection) 04/22/2022    Symptomatic sinus bradycardia 01/27/2022    Bilateral leg edema 01/20/2022    Symptomatic bradycardia     Weakness 01/17/2022    Pain of right lower extremity     Degloving injury     Leg laceration, unspecified laterality, initial encounter 10/31/2021    Altered mental status     Hyperkalemia     Encephalopathy acute 04/21/2021    Acute renal failure superimposed on stage 3 chronic kidney disease ([address] 02/15/2021    Urinary tract infection associated with indwelling urethral catheter ([address] 02/15/2021    Acute cystitis with hematuria 02/14/2021    Abnormal angiogram 08/27/2020    H/O angiography 08/20/2020    Abnormal stress test 08/18/2020    Constipation 06/09/2020    Encopresis with constipation and overflow incontinence 06/09/2020    Cellulitis of scrotum 05/02/2020    Acute renal injury ([address] 03/17/2020    Pseudomonas infection 02/06/2020    Dyspnea     Bradycardia     CHF with unknown LVEF ([address] 01/13/2020    Gross hematuria 12/17/2019    Chronic indwelling Iglesias catheter 11/20/2019    Hyperlipidemia 11/20/2019    Bilateral lower leg cellulitis 11/20/2019    Neurogenic bladder 11/20/2019    Bacteriuria 11/20/2019    Abnormality of urethral meatus 11/20/2019    Acute encephalopathy 10/08/2019    Problem with urinary catheter ([address] 10/07/2019    Edema 21/01/7448    Complicated UTI (urinary tract infection) 01/07/2019    Hypothermia 01/03/2019    Acute low back pain without sciatica 05/26/2017    Hip fracture, right [facility] 05/01/2015    Acute right hip pain     Lower GI bleeding 11/10/2014    CAD (coronary artery disease) 01/27/2014    S/P CABG x 5 01/27/2014    Cellulitis 12/16/2013    Essential hypertension 12/16/2013    GERD (gastroesophageal reflux disease) 12/16/2013    Acute blood loss anemia 12/16/2013    Tinea corporis 12/16/2013    Carotid stenosis 12/16/2013    Hypotension 11/30/2013    Light headed 11/30/2013        Assessment:   1. Bradycardia    2. Dizziness    3. Essential hypertension    4. Atrial tachycardia (HCC)         Bradycardia/dizziness  - In NSR, HR 82, no complaints of dizziness  - No further complaints of dizziness  - 2-week Cam (6/247/9/21) showed average HR 60 (331 49), NSR, first-degree AV block, 6 episodes AT longest 11 beats, 1 pause lasting 2.5 seconds at 5:30 AM, reviewed results with patient  - F/u 6 months w/ Dr. Fran Bowman  - ECG ordered and results personally reviewed     HTN  - BP well controlled, 116/60  - No medications at this time, lifestyle mgmt    EF of 95-62%  No known systolic HF  ASA and Statin for CAD  No known AF     No Tobacco use     All questions and concerns were addressed to the patient/family. Alternatives to my treatment were discussed. The note was completed using EMR. Every effort was made to ensure accuracy; however, inadvertent computerized transcription errors may be present. Patient received education regarding their diagnosis, treatment and medications while in the office today.       Suzanne Ulloa, [address]

## 2022-05-22 PROBLEM — N39.0 UTI (URINARY TRACT INFECTION): Status: RESOLVED | Noted: 2022-04-22 | Resolved: 2022-05-22

## 2022-05-25 ENCOUNTER — HOSPITAL ENCOUNTER (INPATIENT)
Age: 82
LOS: 8 days | Discharge: NURSING, REHAB OR LONG-TERM CARE | DRG: 463 | End: 2022-06-02
Attending: STUDENT IN AN ORGANIZED HEALTH CARE EDUCATION/TRAINING PROGRAM | Admitting: INTERNAL MEDICINE
Payer: MEDICARE

## 2022-05-25 ENCOUNTER — APPOINTMENT (OUTPATIENT)
Dept: GENERAL RADIOLOGY | Age: 82
DRG: 463 | End: 2022-05-25
Payer: MEDICARE

## 2022-05-25 DIAGNOSIS — M86.9 OSTEOMYELITIS OF LEFT FOOT, UNSPECIFIED TYPE (HCC): Primary | ICD-10-CM

## 2022-05-25 DIAGNOSIS — M86.172 OSTEOMYELITIS OF ANKLE OR FOOT, ACUTE, LEFT (HCC): ICD-10-CM

## 2022-05-25 LAB
ANION GAP SERPL CALCULATED.3IONS-SCNC: 8 MMOL/L (ref 3–16)
BASOPHILS ABSOLUTE: 0.1 K/UL (ref 0–0.2)
BASOPHILS RELATIVE PERCENT: 1 %
BUN BLDV-MCNC: 25 MG/DL (ref 7–20)
C-REACTIVE PROTEIN: 16.3 MG/L (ref 0–5.1)
CALCIUM SERPL-MCNC: 8.3 MG/DL (ref 8.3–10.6)
CHLORIDE BLD-SCNC: 105 MMOL/L (ref 99–110)
CO2: 24 MMOL/L (ref 21–32)
CREAT SERPL-MCNC: 0.9 MG/DL (ref 0.8–1.3)
EOSINOPHILS ABSOLUTE: 0.7 K/UL (ref 0–0.6)
EOSINOPHILS RELATIVE PERCENT: 7.5 %
GFR AFRICAN AMERICAN: >60
GFR NON-AFRICAN AMERICAN: >60
GLUCOSE BLD-MCNC: 98 MG/DL (ref 70–99)
HCT VFR BLD CALC: 35.7 % (ref 40.5–52.5)
HEMOGLOBIN: 11.5 G/DL (ref 13.5–17.5)
LACTIC ACID, SEPSIS: 0.8 MMOL/L (ref 0.4–1.9)
LACTIC ACID, SEPSIS: 0.8 MMOL/L (ref 0.4–1.9)
LYMPHOCYTES ABSOLUTE: 1.9 K/UL (ref 1–5.1)
LYMPHOCYTES RELATIVE PERCENT: 20.2 %
MCH RBC QN AUTO: 29.7 PG (ref 26–34)
MCHC RBC AUTO-ENTMCNC: 32.1 G/DL (ref 31–36)
MCV RBC AUTO: 92.5 FL (ref 80–100)
MONOCYTES ABSOLUTE: 0.7 K/UL (ref 0–1.3)
MONOCYTES RELATIVE PERCENT: 7 %
NEUTROPHILS ABSOLUTE: 6 K/UL (ref 1.7–7.7)
NEUTROPHILS RELATIVE PERCENT: 64.3 %
PDW BLD-RTO: 17 % (ref 12.4–15.4)
PLATELET # BLD: 199 K/UL (ref 135–450)
PMV BLD AUTO: 9.1 FL (ref 5–10.5)
POTASSIUM REFLEX MAGNESIUM: 4.5 MMOL/L (ref 3.5–5.1)
PRO-BNP: 1088 PG/ML (ref 0–449)
RBC # BLD: 3.86 M/UL (ref 4.2–5.9)
SEDIMENTATION RATE, ERYTHROCYTE: 48 MM/HR (ref 0–20)
SODIUM BLD-SCNC: 137 MMOL/L (ref 136–145)
WBC # BLD: 9.3 K/UL (ref 4–11)

## 2022-05-25 PROCEDURE — 73630 X-RAY EXAM OF FOOT: CPT

## 2022-05-25 PROCEDURE — 85025 COMPLETE CBC W/AUTO DIFF WBC: CPT

## 2022-05-25 PROCEDURE — 87077 CULTURE AEROBIC IDENTIFY: CPT

## 2022-05-25 PROCEDURE — 73590 X-RAY EXAM OF LOWER LEG: CPT

## 2022-05-25 PROCEDURE — 36415 COLL VENOUS BLD VENIPUNCTURE: CPT

## 2022-05-25 PROCEDURE — 83880 ASSAY OF NATRIURETIC PEPTIDE: CPT

## 2022-05-25 PROCEDURE — 87070 CULTURE OTHR SPECIMN AEROBIC: CPT

## 2022-05-25 PROCEDURE — 86403 PARTICLE AGGLUT ANTBDY SCRN: CPT

## 2022-05-25 PROCEDURE — 2580000003 HC RX 258: Performed by: STUDENT IN AN ORGANIZED HEALTH CARE EDUCATION/TRAINING PROGRAM

## 2022-05-25 PROCEDURE — 1200000000 HC SEMI PRIVATE

## 2022-05-25 PROCEDURE — 87205 SMEAR GRAM STAIN: CPT

## 2022-05-25 PROCEDURE — 87186 SC STD MICRODIL/AGAR DIL: CPT

## 2022-05-25 PROCEDURE — 85652 RBC SED RATE AUTOMATED: CPT

## 2022-05-25 PROCEDURE — 6360000002 HC RX W HCPCS: Performed by: STUDENT IN AN ORGANIZED HEALTH CARE EDUCATION/TRAINING PROGRAM

## 2022-05-25 PROCEDURE — 99285 EMERGENCY DEPT VISIT HI MDM: CPT

## 2022-05-25 PROCEDURE — 86140 C-REACTIVE PROTEIN: CPT

## 2022-05-25 PROCEDURE — 80048 BASIC METABOLIC PNL TOTAL CA: CPT

## 2022-05-25 PROCEDURE — 83605 ASSAY OF LACTIC ACID: CPT

## 2022-05-25 RX ORDER — TRAMADOL HYDROCHLORIDE 50 MG/1
25 TABLET ORAL EVERY 6 HOURS PRN
Status: DISCONTINUED | OUTPATIENT
Start: 2022-05-25 | End: 2022-06-02 | Stop reason: HOSPADM

## 2022-05-25 RX ORDER — ONDANSETRON 4 MG/1
4 TABLET, ORALLY DISINTEGRATING ORAL EVERY 8 HOURS PRN
Status: DISCONTINUED | OUTPATIENT
Start: 2022-05-25 | End: 2022-06-02 | Stop reason: HOSPADM

## 2022-05-25 RX ORDER — GABAPENTIN 600 MG/1
600 TABLET ORAL 2 TIMES DAILY
Status: DISCONTINUED | OUTPATIENT
Start: 2022-05-25 | End: 2022-06-02 | Stop reason: HOSPADM

## 2022-05-25 RX ORDER — SODIUM CHLORIDE 9 MG/ML
INJECTION, SOLUTION INTRAVENOUS PRN
Status: DISCONTINUED | OUTPATIENT
Start: 2022-05-25 | End: 2022-06-02 | Stop reason: HOSPADM

## 2022-05-25 RX ORDER — ATORVASTATIN CALCIUM 80 MG/1
80 TABLET, FILM COATED ORAL NIGHTLY
Status: DISCONTINUED | OUTPATIENT
Start: 2022-05-25 | End: 2022-06-02 | Stop reason: HOSPADM

## 2022-05-25 RX ORDER — SODIUM HYPOCHLORITE 1.25 MG/ML
SOLUTION TOPICAL DAILY
Status: DISCONTINUED | OUTPATIENT
Start: 2022-05-26 | End: 2022-06-02 | Stop reason: HOSPADM

## 2022-05-25 RX ORDER — TAMSULOSIN HYDROCHLORIDE 0.4 MG/1
0.8 CAPSULE ORAL DAILY
Status: DISCONTINUED | OUTPATIENT
Start: 2022-05-26 | End: 2022-06-02 | Stop reason: HOSPADM

## 2022-05-25 RX ORDER — POLYETHYLENE GLYCOL 3350 17 G/17G
17 POWDER, FOR SOLUTION ORAL DAILY PRN
Status: DISCONTINUED | OUTPATIENT
Start: 2022-05-25 | End: 2022-06-02 | Stop reason: HOSPADM

## 2022-05-25 RX ORDER — LANOLIN ALCOHOL/MO/W.PET/CERES
1000 CREAM (GRAM) TOPICAL DAILY
Status: DISCONTINUED | OUTPATIENT
Start: 2022-05-26 | End: 2022-06-02 | Stop reason: HOSPADM

## 2022-05-25 RX ORDER — ACETAMINOPHEN 650 MG/1
650 SUPPOSITORY RECTAL EVERY 6 HOURS PRN
Status: DISCONTINUED | OUTPATIENT
Start: 2022-05-25 | End: 2022-06-02 | Stop reason: HOSPADM

## 2022-05-25 RX ORDER — SODIUM CHLORIDE 0.9 % (FLUSH) 0.9 %
5-40 SYRINGE (ML) INJECTION PRN
Status: DISCONTINUED | OUTPATIENT
Start: 2022-05-25 | End: 2022-06-02 | Stop reason: HOSPADM

## 2022-05-25 RX ORDER — PANTOPRAZOLE SODIUM 40 MG/1
40 TABLET, DELAYED RELEASE ORAL
Status: DISCONTINUED | OUTPATIENT
Start: 2022-05-26 | End: 2022-06-02 | Stop reason: HOSPADM

## 2022-05-25 RX ORDER — TRAZODONE HYDROCHLORIDE 50 MG/1
25 TABLET ORAL NIGHTLY
Status: DISCONTINUED | OUTPATIENT
Start: 2022-05-25 | End: 2022-06-02 | Stop reason: HOSPADM

## 2022-05-25 RX ORDER — ASPIRIN 81 MG/1
81 TABLET, CHEWABLE ORAL DAILY
Status: DISCONTINUED | OUTPATIENT
Start: 2022-05-26 | End: 2022-06-02 | Stop reason: HOSPADM

## 2022-05-25 RX ORDER — LEVOTHYROXINE SODIUM 0.07 MG/1
75 TABLET ORAL
Status: DISCONTINUED | OUTPATIENT
Start: 2022-05-26 | End: 2022-06-02 | Stop reason: HOSPADM

## 2022-05-25 RX ORDER — ACETAMINOPHEN 325 MG/1
650 TABLET ORAL EVERY 6 HOURS PRN
Status: DISCONTINUED | OUTPATIENT
Start: 2022-05-25 | End: 2022-06-02 | Stop reason: HOSPADM

## 2022-05-25 RX ORDER — SODIUM CHLORIDE 0.9 % (FLUSH) 0.9 %
5-40 SYRINGE (ML) INJECTION EVERY 12 HOURS SCHEDULED
Status: DISCONTINUED | OUTPATIENT
Start: 2022-05-25 | End: 2022-06-02 | Stop reason: HOSPADM

## 2022-05-25 RX ORDER — ONDANSETRON 2 MG/ML
4 INJECTION INTRAMUSCULAR; INTRAVENOUS EVERY 6 HOURS PRN
Status: DISCONTINUED | OUTPATIENT
Start: 2022-05-25 | End: 2022-06-02 | Stop reason: HOSPADM

## 2022-05-25 RX ADMIN — CEFAZOLIN 2000 MG: 2 INJECTION, POWDER, FOR SOLUTION INTRAMUSCULAR; INTRAVENOUS at 21:13

## 2022-05-25 ASSESSMENT — PAIN DESCRIPTION - PAIN TYPE: TYPE: CHRONIC PAIN

## 2022-05-25 ASSESSMENT — PAIN SCALES - GENERAL
PAINLEVEL_OUTOF10: 6
PAINLEVEL_OUTOF10: 0

## 2022-05-25 ASSESSMENT — PAIN DESCRIPTION - ORIENTATION: ORIENTATION: RIGHT;LEFT

## 2022-05-25 ASSESSMENT — PAIN DESCRIPTION - FREQUENCY: FREQUENCY: CONTINUOUS

## 2022-05-25 ASSESSMENT — ENCOUNTER SYMPTOMS: SHORTNESS OF BREATH: 0

## 2022-05-25 ASSESSMENT — PAIN - FUNCTIONAL ASSESSMENT: PAIN_FUNCTIONAL_ASSESSMENT: 0-10

## 2022-05-25 ASSESSMENT — PAIN DESCRIPTION - LOCATION: LOCATION: FOOT

## 2022-05-25 NOTE — ED PROVIDER NOTES
ED Attending Attestation Note     Date of evaluation: 5/25/2022    This patient was seen by the advanced practice provider. I have seen and examined the patient, agree with the workup, evaluation, management and diagnosis. The care plan has been discussed. My assessment reveals a gentleman with ulcerations to the bilateral lower extremities. The chronicity of the wounds is unclear, but it does seem that he has had progression of ulceration on the left lateral foot. X-rays were obtained. On exam    Vitals:    05/25/22 1647 05/25/22 2046 05/25/22 2300 05/25/22 2330   BP:  (!) 144/62  (!) 128/57   Pulse:  65  67   Resp:    18   Temp:    97.7 °F (36.5 °C)   TempSrc:    Oral   SpO2:  95%  95%   Weight: 224 lb (101.6 kg)  218 lb 14.7 oz (99.3 kg)    Height:   5' 11\" (1.803 m)        General:  Well appearing. No acute distress. Non-toxic appearing    Eyes:  Pupils equally round, reactive, brisk. No discharge from eyes. ENT:  No discharge from nose. OP clear. Neck:  Supple. Nontender. Pulmonary:   Non-labored breathing. Breath sounds clear bilaterally. Cardiac:  Regular rate and rhythm. No murmurs. Abdomen:  Soft. Non-tender. Non-distended. No masses. : There are diffuse areas of erythema and patches with scale and satellite lesions throughout the groin not involving the scrotum or penile shaft. The scrotum itself has some mild edema without skin thickening, warmth, erythema. There is certainly no crepitus please note that chaperone was present for this portion of exam.    Musculoskeletal:  No long bone deformity. No ankle or wrist deformity. Vascular:  Extremities warm and perfused. Skin:  No rash. Warm. Multiple ulcerations to the right lower extremity and left lower extremity with some areas of erythema suggestive of possible cellulitis on the right lower extremity. There is no crepitus or hemorrhagic blistering anywhere. Neuro: Alert and oriented x 3. CN II-XII grossly intact.  Speech and mentation normal.    SUPRIYA  Sensation grossly intact to light touch including BLE    Extremities:  1+ peripheral edema throughout. BLE symmetric.          Francis Quiñonez MD  05/25/22 [id number]

## 2022-05-25 NOTE — ED NOTES
This RN and medic unable to draw blood at this time.  MD aware, md states that he will attempt an ultrasound line     Cayla Aguilar RN  05/25/22 7995

## 2022-05-25 NOTE — ED PROVIDER NOTES
[address] [facility] [address] ENCOUNTER          PHYSICIAN ASSISTANT NOTE       Date of evaluation: 5/25/2022    Chief Complaint     Other (Sent to ED from [address] for redness and swelling to groin that pt reports he has had for a long time. Nursing home never called with report)      History of Present Illness     HPI: Nat Parsons is a [de-identified] y.o. male with history of CHF, a. Fib, hypertension coming from SNF who presents to the emergency department with concern for redness in patient's groin. Patient was evaluated by the wound care nurse and was told that she should come in because of concern for a fungal infection around his groin. Patient notes that this has been a red for a long time, denies any worsening of this. He has wound care that comes for bilateral lower extremities, had dressings replaced earlier today. He has followed with podiatry during most recent hospitalizations. All this history was obtained by patient's nursing facility. He also was recently apparently on liquid vancomycin, though they do not recall why and this is approximate 1 month ago. Patient has no complaints today. He denies any chest pain or difficulty breathing. He denies any abdominal pain, nausea or vomiting. He has chronic foot pain that he attributes to his neuropathy. With the exception of the above, there are no aggravating or alleviating factors. Review of Systems     Review of Systems   Constitutional: Negative for chills and fever. Respiratory: Negative for shortness of breath. Cardiovascular: Negative for chest pain. Genitourinary: Negative for dysuria, hematuria and testicular pain. Neurological: Negative for dizziness and headaches. As stated above, all other systems reviewed and are otherwise negative.      Past Medical, Surgical, Family, and Social History     He has a past medical history of BPH (benign prostatic hyperplasia), Carotid stenosis, Cellulitis, CH[facility]congest[facility] heart failure) [facility] Chronic back pain, Diverticular disease, GERD (gastroesophageal reflux disease), History of atrial fibrillation, Hypercholesteremia, Hypertension, Lower GI bleed, MDRO (multiple drug resistant organisms) resistance, Neuromuscular disorder ([facility] Renal insufficiency, and Vitamin B12 deficiency. 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 His family history includes Heart Disease in his father. He reports that he quit smoking about 52 years ago. He has never used smokeless tobacco. He reports that he does not drink alcohol and does not use drugs. Medications     Previous Medications    ALBUTEROL SULFATE HFA (PROVENTIL HFA) 108 (90 BASE) MCG/ACT INHALER    Inhale 2 puffs into the lungs every 6 hours as needed for Wheezing or Shortness of Breath    ASPIRIN 81 MG CHEWABLE TABLET    Take 1 tablet by mouth daily    ATORVASTATIN (LIPITOR) 80 MG TABLET    Take 80 mg by mouth nightly. BALSAM PERU-CASTOR OIL (VENELEX) OINT OINTMENT    Apply topically daily as needed    DICYCLOMINE (BENTYL) 20 MG TABLET    Take 20 mg by mouth 3 times daily as needed    DOCUSATE SODIUM (COLACE) 100 MG CAPSULE    Take 100 mg by mouth daily as needed for Constipation    FERROUS SULFATE (IRON 325) 325 (65 FE) MG TABLET    Take 1 tablet by mouth 2 times daily    GABAPENTIN (NEURONTIN) 600 MG TABLET    Take 600 mg by mouth 2 times daily.     GUAIFENESIN (MUCINEX) 600 MG EXTENDED RELEASE TABLET    Take 1 tablet by mouth 2 times daily as needed for Congestion    LEVOTHYROXINE (SYNTHROID) 75 MCG TABLET    Take 1 tablet by mouth Daily    LOPERAMIDE (IMODIUM) 2 MG CAPSULE    Take 2 mg by mouth 4 times daily as needed for Diarrhea    MELATONIN 5 MG CAPS    Take 1 capsule by mouth nightly    NUTRITIONAL SUPPLEMENTS (ENSURE HIGH PROTEIN) LIQD    Take 1 Can by mouth 2 times daily    NUTRITIONAL SUPPLEMENTS (RONEY PO)    Take 1 packet by mouth 2 times daily    PANTOPRAZOLE (PROTONIX) 40 MG TABLET    Take 1 tablet by mouth every morning (before breakfast)    TAMSULOSIN (FLOMAX) 0.4 MG CAPSULE    Take 0.8 mg by mouth daily     TRAZODONE (DESYREL) 50 MG TABLET    Take 25 mg by mouth nightly    VITAMIN B-12 (CYANOCOBALAMIN) 1000 MCG TABLET    Take 1,000 mcg by mouth daily    WOUND DRESSINGS (MEPILEX EX)    Apply topically Cleanse skin graft areas with normal saline, pat dry, apply mepilex foam board, change every 3 days as needed. Allergies     He is allergic to bactrim [sulfamethoxazole-trimethoprim]. Physical Exam     INITIAL VITALS: BP: (!) 145/71, Temp: 98.6 °F (37 °C), Heart Rate: 65, Resp: 20, SpO2: 95 %  Physical Exam  Vitals and nursing note reviewed. Constitutional:       General: He is not in acute distress. Appearance: Normal appearance. He is obese. He is ill-appearing. He is not toxic-appearing or diaphoretic. Comments: Chronically ill gentleman lying in bed. HENT:      Head: Normocephalic and atraumatic. Right Ear: External ear normal.      Left Ear: External ear normal.      Nose: Nose normal.      Mouth/Throat:      Mouth: Mucous membranes are moist.      Pharynx: Oropharynx is clear. Eyes:      Extraocular Movements: Extraocular movements intact. Conjunctiva/sclera: Conjunctivae normal.   Cardiovascular:      Rate and Rhythm: Normal rate. Pulses: Normal pulses. Pulmonary:      Effort: Pulmonary effort is normal. No respiratory distress. Abdominal:      General: Abdomen is flat. There is no distension. Palpations: Abdomen is soft. Tenderness: There is no abdominal tenderness. There is no guarding or rebound. Genitourinary:     Comments: Chaperoned by Dr. Dimas Dandy.   Erythematous area involved in bilateral groins and testicles, Harris catheter in place. No obvious underlying cellulitis, palpable fluctuance or induration. No significant testicular swelling. Musculoskeletal:         General: Normal range of motion. Cervical back: Normal range of motion. Comments: Bilateral lower extremities wrapped, with visible wounds with overlying dressings specifically to the right anterior shin, no streaking up the leg. Skin:     General: Skin is warm and dry. Neurological:      Mental Status: He is alert. Mental status is at baseline. Psychiatric:         Mood and Affect: Mood normal.         Behavior: Behavior normal.       Diagnostic Results     RADIOLOGY:  XR TIBIA FIBULA RIGHT (2 VIEWS)   Final Result      No acute fracture seen. LABS:   No results found for this visit on 05/25/22. RECENT VITALS:  BP: (!) 145/71, Temp: 98.6 °F (37 °C), Heart Rate: 65, Resp: 20, SpO2: 95 %     Procedures     n/a    ED Course     Nursing Notes, Past Medical Hx,Past Surgical Hx, Social Hx, Allergies, and Family Hx were reviewed. The patient was given the following medications:  No orders of the defined types were placed in this encounter. CONSULTS:  None    MEDICAL DECISION MAKING / ASSESSMENT / PLAN     Vitals:    05/25/22 1644 05/25/22 1647   BP: (!) 145/71    Pulse: 65    Resp: 20    Temp: 98.6 °F (37 °C)    TempSrc: Oral    SpO2: 95%    Weight:  224 lb (101.6 kg)       Idania Torrez is a [de-identified] y.o. male who presents to the emergency department with concern for infection. Patient was sent in after his wound care nurse thought that he might have a yeast infection to his groin. Patient notes that this is actually been there for a prolonged period of time. He was seen by podiatry most recently while he was admitted to the hospital, had bilateral lower extremities wrapped earlier today by the wound care nurse. The patient has remained hemodynamically stable throughout their stay in the emergency department, and appears well overall. Work-up be pursued with concerns for worsening infection of patient's right lower extremity cellulitis. His genital infection is consistent with a yeast infection. Lab work and x-rays are pending at this time. At this time I am going off service and will be signing out care of the patient to my colleague Jay Jay Dumont MD for further care. Labs is/are still pending. Oncoming provider responsibilities include following up on pending labs/tests, reassessing patient, and appropriate disposition. Please see medical record for further management and medical decision making. The patient was evaluated by myself and the ED Attending Physician, Dr. Meena Lua. All management and disposition plans were discussed and agreed upon. Clinical Impression     1. Yeast infection      This note was dictated using voice-recognition software, which occasionally leads to inadvertent typographic errors. Disposition       DISPOSITION  - Pending.      Ruben Pacheco [place]  05/25/22 2419

## 2022-05-25 NOTE — ED PROVIDER NOTES
>60 >60    GFR African American >60 >60    Calcium 8.3 8.3 - 10.6 mg/dL   C-Reactive Protein   Result Value Ref Range    CRP 16.3 (H) 0.0 - 5.1 mg/L   Sedimentation Rate   Result Value Ref Range    Sed Rate 48 (H) 0 - 20 mm/Hr   Brain Natriuretic Peptide   Result Value Ref Range    Pro-BNP 1,088 (H) 0 - 449 pg/mL   Lactate, Sepsis   Result Value Ref Range    Lactic Acid, Sepsis 0.8 0.4 - 1.9 mmol/L       RECENT VITALS:  BP: (!) 145/71, Temp: 98.6 °F (37 °C), Heart Rate: 65, Resp: 20, SpO2: 95 %     Procedures     None     ED Course     The patient was given the following medications:  Orders Placed This Encounter   Medications    vancomycin (VANCOCIN) 1,500 mg in dextrose 5 % 250 mL IVPB     Order Specific Question:   Antimicrobial Indications     Answer:   Skin and Soft Tissue Infection    ceFAZolin (ANCEF) 2,000 mg in sodium chloride 0.9 % 50 mL IVPB (mini-bag)     Order Specific Question:   Antimicrobial Indications     Answer:   Skin and Soft Tissue Infection       CONSULTS:  IP CONSULT TO PODIATRY  IP CONSULT TO HOSPITALIST    MEDICAL DECISION MAKING / ASSESSMENT / Karine Moriah is a [de-identified] y.o. male with a history of CHF, atrial fibrillation, hypertension who presented from skilled nursing facility with concerns of redness in the groin. Vital signs on arrival showed a slight hypertensive state but were otherwise reassuring. His labs included BNP that was elevated at 1000 in setting of CHF without any respiratory symptoms or hypoxemia. Meliton Sabal markers were elevated with a CRP of 16 and ESR 48. Lactate within normal limits. BMP with no gross electrolyte derangements or renal dysfunction. CBC with no leukocytosis. Plain films of the left foot revealing acute osteomyelitis of the fifth metatarsal bone. Patient was started on antibiotics included vancomycin and Ancef. Podiatry was consulted and will come see the patient at bedside.   Patient is going to be admitted to hospital medicine for further evaluation and care of his chronic wounds and acute osteomyelitis of the left foot. This patient was also evaluated by the attending physician. All care plans were discussed and agreed upon. Clinical Impression     1. Osteomyelitis of left foot, unspecified type ([facility]        Disposition     PATIENT REFERRED TO:  No follow-up provider specified.     DISCHARGE MEDICATIONS:  New Prescriptions    No medications on file       DISPOSITION Decision To Admit 05/25/2022 08:34:43 Peg Gomes MD  Resident  05/25/22 2036

## 2022-05-26 ENCOUNTER — APPOINTMENT (OUTPATIENT)
Dept: MRI IMAGING | Age: 82
DRG: 463 | End: 2022-05-26
Payer: MEDICARE

## 2022-05-26 ENCOUNTER — APPOINTMENT (OUTPATIENT)
Dept: VASCULAR LAB | Age: 82
DRG: 463 | End: 2022-05-26
Payer: MEDICARE

## 2022-05-26 LAB
ANION GAP SERPL CALCULATED.3IONS-SCNC: 10 MMOL/L (ref 3–16)
BASOPHILS ABSOLUTE: 0.1 K/UL (ref 0–0.2)
BASOPHILS RELATIVE PERCENT: 1.7 %
BUN BLDV-MCNC: 24 MG/DL (ref 7–20)
CALCIUM SERPL-MCNC: 8.5 MG/DL (ref 8.3–10.6)
CHLORIDE BLD-SCNC: 108 MMOL/L (ref 99–110)
CO2: 23 MMOL/L (ref 21–32)
CREAT SERPL-MCNC: 0.8 MG/DL (ref 0.8–1.3)
EOSINOPHILS ABSOLUTE: 0.7 K/UL (ref 0–0.6)
EOSINOPHILS RELATIVE PERCENT: 9.5 %
GFR AFRICAN AMERICAN: >60
GFR NON-AFRICAN AMERICAN: >60
GLUCOSE BLD-MCNC: 91 MG/DL (ref 70–99)
HCT VFR BLD CALC: 34.1 % (ref 40.5–52.5)
HEMOGLOBIN: 11 G/DL (ref 13.5–17.5)
LYMPHOCYTES ABSOLUTE: 1.5 K/UL (ref 1–5.1)
LYMPHOCYTES RELATIVE PERCENT: 20.6 %
MCH RBC QN AUTO: 29.6 PG (ref 26–34)
MCHC RBC AUTO-ENTMCNC: 32.1 G/DL (ref 31–36)
MCV RBC AUTO: 92.4 FL (ref 80–100)
MONOCYTES ABSOLUTE: 0.6 K/UL (ref 0–1.3)
MONOCYTES RELATIVE PERCENT: 7.9 %
NEUTROPHILS ABSOLUTE: 4.2 K/UL (ref 1.7–7.7)
NEUTROPHILS RELATIVE PERCENT: 60.3 %
PDW BLD-RTO: 17 % (ref 12.4–15.4)
PLATELET # BLD: 187 K/UL (ref 135–450)
PMV BLD AUTO: 8.8 FL (ref 5–10.5)
POTASSIUM REFLEX MAGNESIUM: 4.4 MMOL/L (ref 3.5–5.1)
PREALBUMIN: 12.4 MG/DL (ref 20–40)
RBC # BLD: 3.7 M/UL (ref 4.2–5.9)
SODIUM BLD-SCNC: 141 MMOL/L (ref 136–145)
WBC # BLD: 7 K/UL (ref 4–11)

## 2022-05-26 PROCEDURE — 6370000000 HC RX 637 (ALT 250 FOR IP): Performed by: INTERNAL MEDICINE

## 2022-05-26 PROCEDURE — 2580000003 HC RX 258: Performed by: INTERNAL MEDICINE

## 2022-05-26 PROCEDURE — 85025 COMPLETE CBC W/AUTO DIFF WBC: CPT

## 2022-05-26 PROCEDURE — 6360000004 HC RX CONTRAST MEDICATION

## 2022-05-26 PROCEDURE — 6360000002 HC RX W HCPCS: Performed by: INTERNAL MEDICINE

## 2022-05-26 PROCEDURE — 36415 COLL VENOUS BLD VENIPUNCTURE: CPT

## 2022-05-26 PROCEDURE — 73718 MRI LOWER EXTREMITY W/O DYE: CPT

## 2022-05-26 PROCEDURE — 1200000000 HC SEMI PRIVATE

## 2022-05-26 PROCEDURE — 80048 BASIC METABOLIC PNL TOTAL CA: CPT

## 2022-05-26 PROCEDURE — 73720 MRI LWR EXTREMITY W/O&W/DYE: CPT

## 2022-05-26 PROCEDURE — A9576 INJ PROHANCE MULTIPACK: HCPCS

## 2022-05-26 PROCEDURE — 93926 LOWER EXTREMITY STUDY: CPT

## 2022-05-26 PROCEDURE — 6370000000 HC RX 637 (ALT 250 FOR IP)

## 2022-05-26 PROCEDURE — 84134 ASSAY OF PREALBUMIN: CPT

## 2022-05-26 PROCEDURE — 2500000003 HC RX 250 WO HCPCS: Performed by: INTERNAL MEDICINE

## 2022-05-26 RX ADMIN — TRAZODONE HYDROCHLORIDE 25 MG: 50 TABLET, FILM COATED ORAL at 21:23

## 2022-05-26 RX ADMIN — SODIUM CHLORIDE, PRESERVATIVE FREE 10 ML: 5 INJECTION INTRAVENOUS at 08:07

## 2022-05-26 RX ADMIN — ATORVASTATIN CALCIUM 80 MG: 80 TABLET, FILM COATED ORAL at 00:40

## 2022-05-26 RX ADMIN — GABAPENTIN 600 MG: 600 TABLET, FILM COATED ORAL at 21:23

## 2022-05-26 RX ADMIN — SODIUM CHLORIDE, PRESERVATIVE FREE 10 ML: 5 INJECTION INTRAVENOUS at 00:59

## 2022-05-26 RX ADMIN — GADOTERIDOL 20 ML: 279.3 INJECTION, SOLUTION INTRAVENOUS at 19:31

## 2022-05-26 RX ADMIN — MICONAZOLE NITRATE: 2 POWDER TOPICAL at 21:23

## 2022-05-26 RX ADMIN — GABAPENTIN 600 MG: 600 TABLET, FILM COATED ORAL at 00:40

## 2022-05-26 RX ADMIN — GABAPENTIN 600 MG: 600 TABLET, FILM COATED ORAL at 08:04

## 2022-05-26 RX ADMIN — CEFEPIME HYDROCHLORIDE 2000 MG: 2 INJECTION, POWDER, FOR SOLUTION INTRAVENOUS at 14:23

## 2022-05-26 RX ADMIN — SODIUM CHLORIDE, PRESERVATIVE FREE 10 ML: 5 INJECTION INTRAVENOUS at 21:23

## 2022-05-26 RX ADMIN — TAMSULOSIN HYDROCHLORIDE 0.8 MG: 0.4 CAPSULE ORAL at 08:04

## 2022-05-26 RX ADMIN — VANCOMYCIN HYDROCHLORIDE 750 MG: 10 INJECTION, POWDER, LYOPHILIZED, FOR SOLUTION INTRAVENOUS at 13:12

## 2022-05-26 RX ADMIN — SODIUM CHLORIDE: 9 INJECTION, SOLUTION INTRAVENOUS at 13:10

## 2022-05-26 RX ADMIN — TRAZODONE HYDROCHLORIDE 25 MG: 50 TABLET, FILM COATED ORAL at 00:40

## 2022-05-26 RX ADMIN — DAKIN'S SOLUTION 0.125% (QUARTER STRENGTH): 0.12 SOLUTION at 10:44

## 2022-05-26 RX ADMIN — ATORVASTATIN CALCIUM 80 MG: 80 TABLET, FILM COATED ORAL at 21:23

## 2022-05-26 RX ADMIN — VANCOMYCIN HYDROCHLORIDE 1500 MG: 10 INJECTION, POWDER, LYOPHILIZED, FOR SOLUTION INTRAVENOUS at 00:38

## 2022-05-26 RX ADMIN — MICONAZOLE NITRATE: 2 POWDER TOPICAL at 08:04

## 2022-05-26 RX ADMIN — CEFEPIME HYDROCHLORIDE 2000 MG: 2 INJECTION, POWDER, FOR SOLUTION INTRAVENOUS at 01:44

## 2022-05-26 RX ADMIN — CYANOCOBALAMIN TAB 1000 MCG 1000 MCG: 1000 TAB at 08:04

## 2022-05-26 RX ADMIN — LEVOTHYROXINE SODIUM 75 MCG: 0.07 TABLET ORAL at 05:11

## 2022-05-26 RX ADMIN — PANTOPRAZOLE SODIUM 40 MG: 40 TABLET, DELAYED RELEASE ORAL at 05:11

## 2022-05-26 ASSESSMENT — PAIN SCALES - GENERAL
PAINLEVEL_OUTOF10: 5
PAINLEVEL_OUTOF10: 0

## 2022-05-26 ASSESSMENT — PAIN DESCRIPTION - ONSET: ONSET: ON-GOING

## 2022-05-26 ASSESSMENT — PAIN DESCRIPTION - DESCRIPTORS: DESCRIPTORS: NUMBNESS;TINGLING

## 2022-05-26 ASSESSMENT — LIFESTYLE VARIABLES: HOW OFTEN DO YOU HAVE A DRINK CONTAINING ALCOHOL: NEVER

## 2022-05-26 ASSESSMENT — PAIN DESCRIPTION - LOCATION: LOCATION: FOOT

## 2022-05-26 ASSESSMENT — PAIN DESCRIPTION - FREQUENCY: FREQUENCY: CONTINUOUS

## 2022-05-26 ASSESSMENT — PAIN - FUNCTIONAL ASSESSMENT: PAIN_FUNCTIONAL_ASSESSMENT: PREVENTS OR INTERFERES SOME ACTIVE ACTIVITIES AND ADLS

## 2022-05-26 ASSESSMENT — PAIN DESCRIPTION - ORIENTATION: ORIENTATION: RIGHT;LEFT

## 2022-05-26 ASSESSMENT — PAIN DESCRIPTION - PAIN TYPE: TYPE: CHRONIC PAIN

## 2022-05-26 NOTE — PROGRESS NOTES
Clinical Pharmacy Progress Note  Medication History      List of of current medications patient is taking is complete. Home Medication list in EPIC updated to reflect changes noted below. Source of information: medication list from [address] made to home medication list:   Medications removed (no longer taking):  · None     Medications added:   · None     Medication doses / instructions adjusted:   · Docusate (takes scheduled, no PRN)  · Ferrous sulfate was marked as \"not taking\", but it is an active med on NH list.  Removed \"not taking\".       Please call with questions--  Thanks--  Mona Wilkins, PharmD, BCPS, [facility]  [id number] ([facility]   5/26/2022 1:13 PM      Current Outpatient Medications   Medication Instructions    albuterol sulfate HFA (PROVENTIL HFA) 108 (90 Base) MCG/ACT inhaler 2 puffs, Inhalation, EVERY 6 HOURS PRN    aspirin 81 mg, Oral, DAILY    atorvastatin (LIPITOR) 80 mg, Oral, NIGHTLY    Balsam [place]-Castor Oil (VENELEX) OINT ointment Topical, DAILY PRN    dicyclomine (BENTYL) 20 mg, Oral, 3 TIMES DAILY PRN    docusate sodium (COLACE) 100 mg, Oral, DAILY    ferrous sulfate (IRON 325) 325 mg, Oral, 2 TIMES DAILY    gabapentin (NEURONTIN) 600 mg, Oral, 2 TIMES DAILY    guaiFENesin (MUCINEX) 600 mg, Oral, 2 TIMES DAILY PRN    levothyroxine (SYNTHROID) 75 mcg, Oral, DAILY    loperamide (IMODIUM) 2 mg, Oral, 4 TIMES DAILY PRN    Melatonin 5 MG CAPS 1 capsule, Oral, NIGHTLY    Nutritional Supplements (ENSURE HIGH PROTEIN) LIQD 1 Can, Oral, 2 TIMES DAILY    Nutritional Supplements (RONEY PO) 1 packet, Oral, 2 TIMES DAILY    pantoprazole (PROTONIX) 40 mg, Oral, DAILY BEFORE BREAKFAST    tamsulosin (FLOMAX) 0.8 mg, Oral, DAILY    traZODone (DESYREL) 25 mg, Oral, NIGHTLY    vitamin B-12 (CYANOCOBALAMIN) 1,000 mcg, Oral, DAILY    Wound Dressings (MEPILEX EX) Apply externally, Cleanse skin graft areas with normal saline, pat dry, apply mepilex foam board, change every 3 days as needed.

## 2022-05-26 NOTE — CONSULTS
[facility] Nurse  Consult Note       NAME:  Brenda Dalton [facility]  MEDICAL RECORD NUMBER:  [id number]  AGE: [de-identified] y.o. GENDER: male  : 1940  TODAY'S DATE:  2022    Subjective   Reason for WOCN Evaluation and Assessment: Sacrum - Stage 3  Usha area & Buttocks - MASD with fungal rash      Najma Faulkner is a [de-identified] y.o. male referred by:   [] Physician  [x] Nursing  [] Other:     Wound Identification:  Wound Type: pressure  Contributing Factors: chronic pressure, decreased mobility, shear force, incontinence of stool and CHF    Wound History: [de-identified] y.o. male who was sent from his F for bilateral groin redness. Patient sees wound care nurse at the facility for his chronic skin wounds of bilateral lower extremities. Patient was evaluated by the wound care nurse today and has advised that he should come to the ED due to the concern of fungal infection in his groin area. According to the patient he has noted this redness in his bilateral groin areas for a long time and denies any worsening of the redness. Patient states that his left lower extremity was neglected and no dressing was changed for over 2 weeks. Patient was recently hospitalized for urinary retention, UTI with MDRO and C. difficile infection. At discharge patient was sent on oral vancomycin as well as antibiotics for his UTI per sensitivity. Patient has no complaints at this time. Denies fever, chills, chest pain, shortness of breath, orthopnea, weight gain.   He has chronic bilateral foot pain due to neuropathy  Patient was found to have acute osteomyelitis of his left fifth toe on plain radiography, with elevated inflammatory markers (CRP = 16.3 and ESR = 48)  Current Wound Care Treatment: Sacrum - foam    Patient Goal of Care:  [x] Wound Healing  [] Odor Control  [] Palliative Care  [] Pain Control   [] Other:         PAST MEDICAL HISTORY        Diagnosis Date    BPH (benign prostatic hyperplasia)     C. difficile colitis 2022    Carotid stenosis 2013    JESU 76-00% stenosis; LICA 28-93% stenosis    Cellulitis 2013    LLE    CHF (congestive heart failure) (HCC)     Chronic back pain     Diverticular disease     Encounter for imaging to screen for metal prior to MRI 2022    [employer]: Synchromed II pump for baclofen -lfe    GERD (gastroesophageal reflux disease)     History of atrial fibrillation     Hypercholesteremia     Hypertension     Lower GI bleed     MDRO (multiple drug resistant organisms) resistance 10/26/2019    urine    Neuromuscular disorder (HCC)     spasticity    Renal insufficiency     Vitamin B12 deficiency        PAST SURGICAL HISTORY    Past Surgical History:   Procedure Laterality Date    BACK SURGERY  2006    lower lumbar    CORONARY ARTERY BYPASS GRAFT  2013    CABG x 5 (Dr Miguel A Franco), svg to diag, om1 and 3, distal rca, kelly to lad.      CYSTOSCOPY N/A 1/10/2019    [facility]    ORIF    LEG SURGERY Right 2021    RIGHT LOWER EXTREMITY ADVANCEMENT FLAP AND SPLIT THICKNESS SKIN GRAFT PLACEMENT; (WOUND- 10 CM X 5.5 CM; CLOSURE- 6 CM X 5.5 CM; SKIN GRAFT- 7.2 CM X 5 CM) performed by Kim Joshi MD at [address] N/A 2020    [facility] performed by Simi Barton MD at [address] [facility] N/A 2020    EGD BIOPSY performed by Simi Barton MD at [address] [facility]    Family History   Problem Relation Age of Onset    Heart Disease Father        SOCIAL HISTORY    Social History     Tobacco Use    Smoking status: Former Smoker     Quit date: 1969     Years since quittin.5    Smokeless tobacco: Never Used   Vaping Use    Vaping Use: Never used   Substance Use Topics    Alcohol use: No    Drug use: No       ALLERGIES    Allergies   Allergen Reactions    Bactrim [Sulfamethoxazole-Trimethoprim] Rash       MEDICATIONS    No current facility-administered medications on file prior to encounter. Current Outpatient Medications on File Prior to Encounter   Medication Sig Dispense Refill    Nutritional Supplements (RONEY PO) Take 1 packet by mouth 2 times daily      Nutritional Supplements (ENSURE HIGH PROTEIN) LIQD Take 1 Can by mouth 2 times daily      Wound Dressings (MEPILEX EX) Apply topically Cleanse skin graft areas with normal saline, pat dry, apply mepilex foam board, change every 3 days as needed.  traZODone (DESYREL) 50 MG tablet Take 25 mg by mouth nightly      loperamide (IMODIUM) 2 MG capsule Take 2 mg by mouth 4 times daily as needed for Diarrhea      Melatonin 5 MG CAPS Take 1 capsule by mouth nightly      levothyroxine (SYNTHROID) 75 MCG tablet Take 1 tablet by mouth Daily 30 tablet 3    albuterol sulfate HFA (PROVENTIL HFA) 108 (90 Base) MCG/ACT inhaler Inhale 2 puffs into the lungs every 6 hours as needed for Wheezing or Shortness of Breath 18 g 3    guaiFENesin (MUCINEX) 600 MG extended release tablet Take 1 tablet by mouth 2 times daily as needed for Congestion 60 tablet 2    ferrous sulfate (IRON 325) 325 (65 Fe) MG tablet Take 1 tablet by mouth 2 times daily (Patient not taking: Reported on 5/19/2022) 180 tablet 1    Balsam Peru-Castor Oil (VENELEX) OINT ointment Apply topically daily as needed      docusate sodium (COLACE) 100 MG capsule Take 100 mg by mouth daily as needed for Constipation      gabapentin (NEURONTIN) 600 MG tablet Take 600 mg by mouth 2 times daily.       dicyclomine (BENTYL) 20 MG tablet Take 20 mg by mouth 3 times daily as needed      vitamin B-12 (CYANOCOBALAMIN) 1000 MCG tablet Take 1,000 mcg by mouth daily      aspirin 81 MG chewable tablet Take 1 tablet by mouth daily 30 tablet 3    pantoprazole (PROTONIX) 40 MG tablet Take 1 tablet by mouth every morning (before breakfast) 30 tablet 3    tamsulosin renal injury ([facility] N17.9    Cellulitis of scrotum N49.2    Constipation K59.00    Encopresis with constipation and overflow incontinence R15.9    Abnormal stress test R94.39    H/O angiography Z92.89    Abnormal angiogram R93.89    Acute cystitis with hematuria N30.01    Acute renal failure superimposed on stage 3 chronic kidney disease (HCC) N17.9, N18.30    Urinary tract infection associated with indwelling urethral catheter ([facility]) T83.511A, N39.0    Encephalopathy acute G93.40    Altered mental status R41.82    Hyperkalemia E87.5    Leg laceration, unspecified laterality, initial encounter S81.819A    Pain of right lower extremity M79.604    Degloving injury T14. 8XXA    Weakness R53.1    Symptomatic bradycardia R00.1    Bilateral leg edema R60.0    Symptomatic sinus bradycardia R00.1    Multiple drug resistant organism (MDRO) culture positive Z16.24    Osteomyelitis ([facility]) M86.9       Measurements:  Negative Pressure Wound Therapy Leg Anterior; Lower;Right (Active)   Number of days: 205       Wound 11/01/21 Buttocks Right;Left redness, open areas, stage 2 ulcer (Active)   Number of days: 206       Wound 01/27/22 Sacrum Inner;Medial;Left;Right (Active)   Number of days: 119       Wound 01/27/22 Pretibial Right (Active)   Number of days: 119       Wound 01/27/22 Elbow Right;Posterior (Active)   Number of days: 119       Wound 05/26/22 Sacrum Medial (Active)   Wound Image   05/26/22 1148   Wound Etiology Pressure Stage 3 05/26/22 1148   Dressing Status New drainage noted 05/26/22 1148   Wound Cleansed Other (Comment) 05/26/22 1148   Dressing/Treatment Alginate with Ag; Foam 05/26/22 1148   Dressing Change Due 05/27/22 05/26/22 1148   Wound Length (cm) 3 cm 05/26/22 1148   Wound Width (cm) 3 cm 05/26/22 1148   Wound Depth (cm) 0.2 cm 05/26/22 1148   Wound Surface Area (cm^2) 9 cm^2 05/26/22 1148   Wound Volume (cm^3) 1.8 cm^3 05/26/22 1148   Wound Assessment [place]/red;Slough 05/26/22 1148   Drainage Amount Scant 05/26/22 1148   Drainage Description Serosanguinous 05/26/22 1148   Odor None 05/26/22 1148   Usha-wound Assessment Blanchable erythema;Denuded;Erosion 05/26/22 1148   Margins Defined edges 05/26/22 1148   Number of days: 0   Sacrum:      Usha Area:      Response to treatment:  Well tolerated by patient. Pain Assessment:  Severity:  0 / 10  Quality of pain: N/A  Wound Pain Timing/Severity: none  Premedicated: No    Plan   Plan of Care: Wound 05/26/22 Sacrum Medial-Dressing/Treatment: Alginate with Ag,Foam   Recommendation: Sacrum - cleanse NS, cover wound bed with a piece of alginate ag, change daily, cover with foam dressing. Bilateral Buttocks and Usha area - cleanse with warm water and soap, dry completely, apply zinc paste to dry flaky skin, use fungal powder in groin areas  No depends when in bed  Call Wound Care for deterioration [phone]    Specialty Bed Required : Yes   [x] Low Air Loss   [x] Pressure Redistribution  [] Fluid Immersion  [] Bariatric  [] Total Pressure Relief  [] Other:     Current Diet: ADULT DIET; Regular; 4 carb choices (60 gm/meal);  Low Fat/Low Chol/High Fiber/2 gm Na  Dietician consult:  No    Discharge Plan:  Placement for patient upon discharge: skilled nursing    Patient appropriate for Outpatient [address]: No    Referrals:  []   [] [facility]  [] Supplies  [] Other    Patient/Caregiver Teaching:  Level of patient/caregiver understanding able to:   [] Indicates understanding       [] Needs reinforcement  [] Unsuccessful      [] Verbal Understanding  [] Demonstrated understanding       [] No evidence of learning  [] Refused teaching         [] N/A       Electronically signed by Fidencio Maradiaga RN, Thanh Sheppard on 5/26/2022 at 11:50 AM

## 2022-05-26 NOTE — PROGRESS NOTES
Podiatric Surgery Daily Progress Note      Admit Date: 5/25/2022      Code:Full Code    Patient seen and examined, labs and records reviewed    Subjective:     Patient seen at bedside this AM.  Patient denies any overnight acute event. Patient denies f/c/n/v/sob/cp. Review of Systems: A 12 point review of symptoms is unremarkable with the exception of the chief complaint. Patient specifically denies nausea, fever, vomiting, chills, shortness of breath, chest pain, abdominal pain, constipation or difficulty urinating. Objective     /81   Pulse 65   Temp 97.8 °F (36.6 °C) (Oral)   Resp 18   Ht 5' 11\" (1.803 m)   Wt 218 lb 14.7 oz (99.3 kg)   SpO2 95%   BMI 30.53 kg/m²      I/O:    Intake/Output Summary (Last 24 hours) at 5/26/2022 1020  Last data filed at 5/25/2022 2145  Gross per 24 hour   Intake 46.05 ml   Output --   Net 46.05 ml              Wt Readings from Last 3 Encounters:   05/25/22 218 lb 14.7 oz (99.3 kg)   04/22/22 232 lb 12.8 oz (105.6 kg)   04/11/22 220 lb (99.8 kg)       LABS:    Recent Labs     05/25/22  1848 05/26/22  0657   WBC 9.3 7.0   HGB 11.5* 11.0*   HCT 35.7* 34.1*    187        Recent Labs     05/26/22  0657      K 4.4      CO2 23   BUN 24*   CREATININE 0.8      No results for input(s): PROT, INR, APTT in the last 72 hours. LOWER EXTREMITY EXAMINATION    Dressing to bilateral LE intact. No strikethrough noted to the external dressing. Mild serosanguineous drainage noted to the internal layers of the dressing. VASCULAR: DP and PT pulses nonpalpable 0/4 b/l. Upon handheld doppler examination, DP and PT pulses were noted to have biphasic signals b/l. CFT is brisk to the digits of the foot b/l. Skin temperature is warm to wark from proximal to distal with focal calor noted right lower extremity. +2 pitting edema noted bilateral lower extremities, right greater than left.  No pain with calf compression b/l.     NEUROLOGIC: Gross and epicritic sensation is intact b/l. Protective sensation is diminished at all pedal sites b/l.     DERMATOLOGIC: Diffuse dermatologic changes noted to b/l LE with erythema present from just proximal to the ankle joint to just distal to the knee joint which does not resolve with extremity elevation.      Patient provided verbal consent for photos to be obtained today:  Right lower extremity:           Multiple full-thickness ulcerations noted to the anterior aspect and anterior lateral aspect of the right leg. Wound base is a combination of granular, and fibrotic tissue. Periwound erythema noted. No purulent drainage present. No malodor noted. No fluctuance or crepitus noted.         Superficial ulceration noted to the dorsal medial aspect of the right fifth digit. No acute signs of infection noted.         Full-thickness ulceration noted at the fifth metatarsal head. Wound base is a combination of granular and necrotic tissue. Wound measures approximately 2 cm x 2 cm x 0.1 cm. No purulent drainage expressed. No tracking, tunneling, crepitus, fluctuance, or malodor noted.     Full-thickness ulceration noted just proximal to the above mentioned wound on the fifth metatarsal.  Wound base is granular in nature. The wound measures approximately 2 cm x 1.5 cm x 0.1 cm. No purulent drainage expressed. No tracking, tunneling, crepitus, fluctuance, or malodor noted.         Deep tissue injury noted to the posterior lateral aspect of the right lower extremity. No fluctuance or crepitus noted. No purulent drainage expressed. No malodor noted.     Left lower extremity:         Full-thickness ulceration noted to the anterior aspect of the shin to the left lower extremity. The wound base is granular in nature. No erythema noted periwound. The wound does not probe to bone.   No purulent drainage, tracking, tunneling, crepitus, fluctuance, or acute signs of infection noted.         Serous bulla noted to the dorsal aspect of the fourth interspace. Bulla was lysed, serous drainage noted coming from bulla. Tissue underneath bulla was noted to be dermal pink tissue. Full-thickness ulceration noted to the distal lateral aspect of the left foot. Measures approximately 3 cm x 2.4 cm x 0.3 cm the wound base is a mixture of granular, fibrotic, and ischemic tissue. Erythema noted periwound. The wound does probe to bone. No purulent drainage expressed. Wound does track about 2 cm medially and proximally. No crepitus or fluctuance noted. Slight malodor noted.         Deep tissue injuries noted to the posterior aspect of the heel to the left lower extremity. No fluctuance or crepitus noted. No purulent drainage noted. No malodor noted.     MUSCULOSKELETAL: Muscle strength is 4/5 for all pedal groups tested. Mild pain with palpation of the periwound areas to bilateral lower extremities. Ankle joint ROM is decreased in dorsiflexion with the knee extended. No obvious biomechanical abnormalities. IMAGING:  XR Tib/Fib Right (5/25/2022)  Narrative   RIGHT TIBIA FIBULA  4 views       HISTORY: Pain       COMPARISON: none       FINDINGS:       No evidence of acute fracture or traumatic bony abnormality is seen.       Osteopenia is noted.           Impression       No acute fracture seen.      XR Left Foot (5/25/2022)  Narrative   EXAM: LEFT FOOT 3 VIEWS       INDICATION: Left 5th toe wound.       COMPARISON: 1/14/20       FINDINGS:       Wound or ulceration identified over the 5th MTP region. There is loss of cortical margin laterally suggesting erosive changes in the neck and head of the metatarsal bone. Bones are otherwise severely osteopenic. No fracture identified.  Extensive vascular    calcifications.           Impression       Acute osteomyelitis in the 5th metatarsal bone.          XR foot right (5/25/2022)  Narrative   EXAM: RIGHT FOOT 3 VIEWS       INDICATION: Right foot wound       COMPARISON: None       FINDINGS:     prevent further deep tissue injury.  -Patient is nonweightbearing at baseline    DISPO: Multiple ulcerations to bilateral lower extremity. Osteomyelitis of the left fifth metatarsal.  Cellulitis right lower extremity. XR reviewed. Labs and imaging ordered. ID consulted; appreciate recommendations. We will continue to follow patient while in-house. Patient assessment and plan was discussed with Dr. Aravind Andrews DPM.    Efraín Rosario DPM   Podiatric Resident PGY1  Pager [phone] or [employer]  5/26/2022, 10:20 AM        Patient was seen and evaluated at bedside. Agree with residents assessment and treatment plan.   Aravind Andrews DPM

## 2022-05-26 NOTE — H&P
----- Message from Lori Navarro MD sent at 3/9/2018  2:28 PM CST -----  Cytologic Interpretation :     NEGATIVE FOR INTRAEPITHELIAL LESION OR MALIGNANCY.       Satisfactory for evaluation.  Presence of endocervical/transformation zone     component.     Procedures/Addenda:     HPV DNA Assay ([place]):     Interpretation                                    High Risk HPV DNA Assay Result:  NONE DETECTED       Next cervical cancer screening is due in 5 years in the absence of any change in your GYN health or history.  Next annual GYN exam is due in one year.  Call for any concerns in the interim.   Hospital Medicine History & Physical      PCP: [facility] SANTIAGOD[facility] [address]    Date of Admission: 5/25/2022    Date of Service: Pt seen/examined on 5/25/2022 and Admitted to Inpatient with expected LOS greater than two midnights due to medical therapy. Chief Complaint: Patient was sent [facility] for redness in groin area bilaterally      History Of Present Illness:  [de-identified] y.o. male who was sent from his ECF for bilateral groin redness. Patient sees wound care nurse at the facility for his chronic skin wounds of bilateral lower extremities. Patient was evaluated by the wound care nurse today and has advised that he should come to the ED due to the concern of fungal infection in his groin area. According to the patient he has noted this redness in his bilateral groin areas for a long time and denies any worsening of the redness. Patient states that his left lower extremity was neglected and no dressing was changed for over 2 weeks. Patient was recently hospitalized for urinary retention, UTI with MDRO and C. difficile infection. At discharge patient was sent on oral vancomycin as well as antibiotics for his UTI per sensitivity. Patient has no complaints at this time. Denies fever, chills, chest pain, shortness of breath, orthopnea, weight gain.   He has chronic bilateral foot pain due to neuropathy    Patient was found to have acute osteomyelitis of his left fifth toe on plain radiography, with elevated inflammatory markers (CRP = 16.3 and ESR = 48)    Past Medical History:          Diagnosis Date    BPH (benign prostatic hyperplasia)     Carotid stenosis 12/2013    JESU 10-52% stenosis; LICA 77-13% stenosis    Cellulitis 12/2013    LLE    [facility]estive [facility] failure[facility])     Chronic back pain     Diverticular disease     GERD (gastroesophageal reflux disease)     History of atrial fibrillation     Hypercholesteremia     Hypertension     Lower GI bleed     MDRO (multiple drug resistant organisms) resistance 10/26/2019    urine    Neuromuscular disorder (HCC)     spasticity    Renal insufficiency     Vitamin B12 deficiency        Past Surgical History:          Procedure Laterality Date    BACK SURGERY  2006    lower lumbar    CORONARY ARTERY BYPASS GRAFT  12/13/2013    CABG x 5 ([facility]), svg to diag, om1 and 3, distal rca, kelly to lad.  CYSTOSCOPY N/A 1/10/2019    CYSTOSCOPY performed by Goldie Lopez MD at [facility] [address] Right 5/1/2015    ORIF    LEG SURGERY Right 11/2/2021    RIGHT LOWER EXTREMITY ADVANCEMENT FLAP AND SPLIT THICKNESS SKIN GRAFT PLACEMENT; (WOUND- 10 CM X 5.5 CM; CLOSURE- 6 CM X 5.5 CM; SKIN GRAFT- 7.2 CM X 5 CM) performed by Stefany Contreras MD at [address] [place] [address] 6/11/2020    [facility] performed by Yun Otto MD at [facility] 5/4/2020    EGD BIOPSY performed by Yun Otto MD at [place] [address]       Medications Prior to Admission:      Prior to Admission medications    Medication Sig Start Date End Date Taking? Authorizing Provider   Nutritional Supplements (RONEY PO) Take 1 packet by mouth 2 times daily    Historical Provider, MD   Nutritional Supplements (ENSURE HIGH PROTEIN) LIQD Take 1 Can by mouth 2 times daily    Historical Provider, MD   Wound Dressings (MEPILEX EX) Apply topically Cleanse skin graft areas with normal saline, pat dry, apply mepilex foam board, change every 3 days as needed.     Historical Provider, MD   traZODone (DESYREL) 50 MG tablet Take 25 mg by mouth nightly    Historical Provider, MD   loperamide (IMODIUM) 2 MG capsule Take 2 mg by mouth 4 times daily as needed for Diarrhea    Historical Provider, MD   Melatonin 5 MG CAPS Take 1 capsule by mouth nightly    Historical Provider, MD   levothyroxine (SYNTHROID) 75 MCG tablet Take 1 tablet by mouth Daily 2/3/22   Armida Burton MD   albuterol sulfate HFA (PROVENTIL HFA) 108 (90 Base) MCG/ACT inhaler Inhale 2 puffs into the lungs every 6 hours as needed for Wheezing or Shortness of Breath 1/20/22   Wang Archibald MD   guaiFENesin (MUCINEX) 600 MG extended release tablet Take 1 tablet by mouth 2 times daily as needed for Congestion 1/20/22   Wang Archibald MD   ferrous sulfate (IRON 325) 325 (65 Fe) MG tablet Take 1 tablet by mouth 2 times daily  Patient not taking: Reported on 5/19/2022 1/20/22   Wang Bunk, MD   Balsam [place] Oil [facility]) OINT ointment Apply topically daily as needed    Historical Provider, MD   docusate sodium (COLACE) 100 MG capsule Take 100 mg by mouth daily as needed for Constipation    Historical Provider, MD   gabapentin (NEURONTIN) 600 MG tablet Take 600 mg by mouth 2 times daily. 5/13/21   Historical Provider, MD   dicyclomine (BENTYL) 20 MG tablet Take 20 mg by mouth 3 times daily as needed 12/3/20   Historical Provider, MD   vitamin B-12 (CYANOCOBALAMIN) 1000 MCG tablet Take 1,000 mcg by mouth daily    Historical Provider, MD   aspirin 81 MG chewable tablet Take 1 tablet by mouth daily 5/7/20   Prudencio Archibald MD   pantoprazole (PROTONIX) 40 MG tablet Take 1 tablet by mouth every morning (before breakfast) 5/6/20   Prudencio Archibald MD   tamsulosin (FLOMAX) 0.4 MG capsule Take 0.8 mg by mouth daily     Historical Provider, MD   atorvastatin (LIPITOR) 80 MG tablet Take 80 mg by mouth nightly. Historical Provider, MD       Allergies:  Bactrim [sulfamethoxazole-trimethoprim]    Social History:    TOBACCO:   reports that he quit smoking about 52 years ago. He has never used smokeless tobacco.  ETOH:   reports no history of alcohol use. E-Cigarettes/Vaping Use     Questions Responses    E-Cigarette/Vaping Use Never User    Start Date     Passive Exposure     Quit Date     Counseling Given     Comments         Family History:    Reviewed in detail and negative for DM, CAD, Cancer, CVA.  Positive as follows:        Problem Relation Age of Onset    Heart Disease Father        REVIEW OF SYSTEMS COMPLETED:   Pertinent positives as noted in the HPI. All other systems reviewed and negative. PHYSICAL EXAM PERFORMED:    BP (!) 144/62   Pulse 65   Temp 98.6 °F (37 °C) (Oral)   Resp 20   Wt 224 lb (101.6 kg)   SpO2 95%   BMI 31.24 kg/m²     General appearance:  No apparent distress, appears stated age and cooperative. Obese, chronically ill-appearing. HEENT:  Normal cephalic, atraumatic without obvious deformity. Pupils equal, round, and reactive to light. Extra ocular muscles intact. Conjunctivae/corneas clear. Neck: Supple, with full range of motion. No jugular venous distention. Trachea midline. Respiratory:  Normal respiratory effort. Clear to auscultation, bilaterally without Rales/Wheezes/Rhonchi. Cardiovascular:  Regular rate and rhythm with normal S1/S2 without murmurs, rubs or gallops. Abdomen: Soft, non-tender, non-distended with normal bowel sounds. Musculoskeletal:  No clubbing, cyanosis or edema bilaterally. Full range of motion without deformity. Bilateral lower extremities wrapped, visible wounds overlying dressings, right > left leg  Skin: Skin color, texture, turgor normal.  Erythematous skin of bilateral groins and testicles. Chronic indwelling Iglesias +. No induration, fluctuance or testicular swelling  Neurologic:  Neurovascularly intact without any focal sensory/motor deficits. Cranial nerves: II-XII intact, grossly non-focal.  Psychiatric:  Alert and oriented, thought content appropriate, normal insight  Capillary Refill: Brisk,3 seconds, normal  Peripheral Pulses: +2 palpable, equal bilaterally       Labs:     Recent Labs     05/25/22  1848   WBC 9.3   HGB 11.5*   HCT 35.7*        Recent Labs     05/25/22  1848      K 4.5      CO2 24   BUN 25*   CREATININE 0.9   CALCIUM 8.3     No results for input(s): AST, ALT, BILIDIR, BILITOT, ALKPHOS in the last 72 hours.   No results for input(s): INR in the last 72 hours. No results for input(s): Urszula Curiel in the last 72 hours. Urinalysis:      Lab Results   Component Value Date    NITRU POSITIVE 04/22/2022    WBCUA 21-50 04/22/2022    BACTERIA 4+ 04/22/2022    RBCUA 3-4 04/22/2022    BLOODU Negative 04/22/2022    SPECGRAV <=1.005 04/22/2022    GLUCOSEU Negative 04/22/2022       Radiology:     CXR: I have reviewed the CXR with the following interpretation: NA  EKG:  I have reviewed the EKG with the following interpretation: NA    XR FOOT LEFT (MIN 3 VIEWS)   Final Result      Acute osteomyelitis in the 5th metatarsal bone. XR TIBIA FIBULA RIGHT (2 VIEWS)   Final Result      No acute fracture seen. ASSESSMENT:    Active Hospital Problems    Diagnosis Date Noted    Osteomyelitis [facility]) [M86.9] 05/25/2022     Priority: Medium   #Acute osteomyelitis of left fifth toe  #Chronic skin wounds through bilateral lower extremities with right lower extremity cellulitis  #Fungal infection/tinea cruris bilateral groins  #Chronic indwelling Iglesias catheter due to urinary retention/BPH  #Recent UTI with MDRO, has completed course of antibiotics  #Recent C. difficile infection without diarrhea, treated with oral vancomycin  #Chronic diastolic CHF without exacerbation  #Bedridden status, likely secondary to neuromuscular disease with spasticity  #History of paroxysmal A. fib  #Chronic medical conditions-essential hypertension, hyperlipidemia, GERD, carotid artery disease. PLAN:  -Continue on vancomycin and Ancef  -Pharmacy consult for vancomycin dosing  -Pain relief as needed  -Podiatry consult, appreciate recommendations and assistance. Bedside I&D done  -Follow wound cultures  -Wound care consult  -Continue his nursing home medications appropriately  -Supportive therapy      DVT Prophylaxis: Lovenox held due to risk of bleeding as patient underwent bedside I&D by podiatry service. Diet: ADULT DIET; Regular; 4 carb choices (60 gm/meal);  Low Fat/Low Chol/High Fiber/2 gm Na  Code Status: Full Code    PT/OT Eval Status: At bedside, patient is chronically bedbound    Dispo -GMF with telemetry       Natalie Antonio MD    Thank you [facility]an for the opportunity to be involved in this patient's care. If you have any questions or concerns please feel free to contact me at [phone].

## 2022-05-26 NOTE — PROGRESS NOTES
4 Eyes Admission Assessment     I agree as the admission nurse that 2 RN's have performed a thorough Head to Toe Skin Assessment on the patient. ALL assessment sites listed below have been assessed on admission. Areas assessed by both nurses:   [x]   Head, Face, and Ears   [x]   Shoulders, Back, and Chest  [x]   Arms, Elbows, and Hands   [x]   Coccyx, Sacrum, and Ischium  [x]   Legs, Feet, and Heels        Does the Patient have Skin Breakdown?   Yes  Terrance Prevention initiated:  Yes  Wound Care Orders initiated:  Yes      [id number]  nurse consulted for Pressure Injury (Stage 3,4, Unstageable, DTI, NWPT, and Complex wounds) or Terrance score 18 or lower:  Yes      Nurse 1 eSignature: Electronically signed by Maria Esther Hilliard RN on 5/26/22 at 4:02 AM EDT    SHARE this note so that the co-signing nurse is able to place an eSignature    Nurse 2 eSignature: Electronically signed by Salima Pyle RN on 5/26/22 at 6:46 AM EDT

## 2022-05-26 NOTE — CONSULTS
Department of Podiatry Consult Note  Resident       Reason for Consult: Foot Ulcer  Requesting Physician:  Dr. Antoinette Dobson MD    CHIEF COMPLAINT:  Bilateral lower extremity ulcerations with left 5th met osteomyelitis    HISTORY OF PRESENT ILLNESS:    The patient is a [de-identified] y.o. male with significant past medical history as listed below who is consulted to podiatry for bilateral lower extremity ulcerations. Patient presented to the hospital secondary to groin swelling, patient was then admit for left fifth met acute osteomyelitis. Patient states that he is coming from a facility from [facility]. Patient states that he wound care nurse changes his dressing once a week to the right lower extremity. He states that his left lower extremity was neglected and no dressing was not changed for over 2 weeks. This will need to change dressings they put Xeroform and roll gauze over top of his wounds. He states that he has pain to bilateral lower extremities especially around the wounds. He states that he does not walk at baseline and is bedbound. He states he does not know if his wounds look worse or better as he cannot see them that well. He denies any other pedal complaints at this time. He denies any nausea, vomiting, fever, chills, shortness of breath, or chest pain at this time.     Past Medical History:        Diagnosis Date    BPH (benign prostatic hyperplasia)     Carotid stenosis 12/2013    JESU 06-79% stenosis; LICA 81-75% stenosis    Cellulitis 12/2013    LLE    [facility]estive [facility] failure[facility])     Chronic back pain     Diverticular disease     GERD (gastroesophageal reflux disease)     History of atrial fibrillation     Hypercholesteremia     Hypertension     Lower GI bleed     MDRO (multiple drug resistant organisms) resistance 10/26/2019    urine    Neuromuscular disorder ([facility])     spasticity    Renal insufficiency     Vitamin B12 deficiency        Past Surgical History: Procedure Laterality Date    BACK SURGERY  2006    lower lumbar    CORONARY ARTERY BYPASS GRAFT  12/13/2013    CABG x 5 (Dr Olivier Crandall), svg to diag, om1 and 3, distal rca, kelly to lad.  CYSTOSCOPY N/A 1/10/2019    CYSTOSCOPY performed by Sabrina Lynne MD at [facility] [address] Right 5/1/2015    ORIF    LEG SURGERY Right 11/2/2021    RIGHT LOWER EXTREMITY ADVANCEMENT FLAP AND SPLIT THICKNESS SKIN GRAFT PLACEMENT; (WOUND- 10 CM X 5.5 CM; CLOSURE- 6 CM X 5.5 CM; SKIN GRAFT- 7.2 CM X 5 CM) performed by Chantel Curran MD at [address] 6/11/2020    [address] [facility] performed by Jovanna Davison MD at [address] [facility] N/A 5/4/2020    EGD BIOPSY performed by Jovanna Davison MD at [facility] ENDOSCOPY       Allergies:   Bactrim [sulfamethoxazole-trimethoprim]    Medications:   Home Meds  No current facility-administered medications on file prior to encounter. Current Outpatient Medications on File Prior to Encounter   Medication Sig Dispense Refill    Nutritional Supplements (RONEY PO) Take 1 packet by mouth 2 times daily      Nutritional Supplements (ENSURE HIGH PROTEIN) LIQD Take 1 Can by mouth 2 times daily      Wound Dressings (MEPILEX EX) Apply topically Cleanse skin graft areas with normal saline, pat dry, apply mepilex foam board, change every 3 days as needed.       traZODone (DESYREL) 50 MG tablet Take 25 mg by mouth nightly      loperamide (IMODIUM) 2 MG capsule Take 2 mg by mouth 4 times daily as needed for Diarrhea      Melatonin 5 MG CAPS Take 1 capsule by mouth nightly      levothyroxine (SYNTHROID) 75 MCG tablet Take 1 tablet by mouth Daily 30 tablet 3    albuterol sulfate HFA (PROVENTIL HFA) 108 (90 Base) MCG/ACT inhaler Inhale 2 puffs into the lungs every 6 hours as needed for Wheezing or Shortness of Breath 18 g 3    guaiFENesin (MUCINEX) 600 MG extended release tablet Take 1 tablet by mouth 2 times daily as needed for Congestion 60 tablet 2    ferrous sulfate (IRON 325) 325 (65 Fe) MG tablet Take 1 tablet by mouth 2 times daily (Patient not taking: Reported on 5/19/2022) 180 tablet 1    Balsam [place]-Castor Oil (VENELEX) OINT ointment Apply topically daily as needed      docusate sodium (COLACE) 100 MG capsule Take 100 mg by mouth daily as needed for Constipation      gabapentin (NEURONTIN) 600 MG tablet Take 600 mg by mouth 2 times daily.  dicyclomine (BENTYL) 20 MG tablet Take 20 mg by mouth 3 times daily as needed      vitamin B-12 (CYANOCOBALAMIN) 1000 MCG tablet Take 1,000 mcg by mouth daily      aspirin 81 MG chewable tablet Take 1 tablet by mouth daily 30 tablet 3    pantoprazole (PROTONIX) 40 MG tablet Take 1 tablet by mouth every morning (before breakfast) 30 tablet 3    tamsulosin (FLOMAX) 0.4 MG capsule Take 0.8 mg by mouth daily       atorvastatin (LIPITOR) 80 MG tablet Take 80 mg by mouth nightly. Current Meds  vancomycin (VANCOCIN) 1,500 mg in dextrose 5 % 250 mL IVPB, Once  ceFAZolin (ANCEF) 2,000 mg in sodium chloride 0.9 % 50 mL IVPB (mini-bag), Once        Family History:   Family History   Problem Relation Age of Onset    Heart Disease Father        Social History:   TOBACCO:   reports that he quit smoking about 52 years ago. He has never used smokeless tobacco.  ETOH:   reports no history of alcohol use. DRUGS:   reports no history of drug use. REVIEW OF SYSTEMS:    A 10 point review of systems was conducted, significant findings as noted in HPI. All other systems negative.       PHYSICAL EXAM:      Vitals:    BP (!) 144/62   Pulse 65   Temp 98.6 °F (37 °C) (Oral)   Resp 20   Wt 224 lb (101.6 kg)   SpO2 95%   BMI 31.24 kg/m²     LABS:   Recent Labs     05/25/22  1848   WBC 9.3   HGB 11.5*   HCT 35.7*        Recent Labs     05/25/22  1848      K 4.5      CO2 24   BUN 25*   CREATININE 0.9     No results for input(s): PROT, INR, APTT in the last 72 hours. GENERAL: Patient is alert and oriented x3. No signs of acute distress noted. LOWER EXTREMITY EXAMINATION:  VASCULAR: DP and PT pulses nonpalpable 0/4 b/l. Upon handheld doppler examination, DP and PT pulses were noted to have biphasic signals b/l. CFT is brisk to the digits of the foot b/l. Skin temperature is warm to wark from proximal to distal with focal calor noted right lower extremity. +2 pitting edema noted bilateral lower extremities, right greater than left. No pain with calf compression b/l. NEUROLOGIC: Gross and epicritic sensation is intact b/l. Protective sensation is diminished at all pedal sites b/l. DERMATOLOGIC: Diffuse dermatologic changes noted to b/l LE with erythema present from just proximal to the ankle joint to just distal to the knee joint which does not resolve with extremity elevation. Patient provided verbal consent for photos to be obtained today, 05/25/22  Right lower extremity:            Multiple full-thickness ulcerations noted to the anterior aspect and anterior lateral aspect of the right leg. Wound base is a combination of granular, and fibrotic tissue. Periwound erythema noted. No purulent drainage present. No malodor noted. No fluctuance or crepitus noted. Superficial ulceration noted to the dorsal medial aspect of the right fifth digit. No acute signs of infection noted. Full-thickness ulceration noted at the fifth metatarsal head. Wound base is a combination of granular and necrotic tissue. Wound measures approximately 2 cm x 2 cm x 0.1 cm. No purulent drainage expressed. No tracking, tunneling, crepitus, fluctuance, or malodor noted. Full-thickness ulceration noted just proximal to the above mentioned wound on the fifth metatarsal.  Wound base is granular in nature. The wound measures approximately 2 cm x 1.5 cm x 0.1 cm. No purulent drainage expressed.   No tracking, tunneling, crepitus, fluctuance, or malodor noted. Deep tissue injury noted to the posterior lateral aspect of the right lower extremity. No fluctuance or crepitus noted. No purulent drainage expressed. No malodor noted. Left lower extremity:          Full-thickness ulceration noted to the anterior aspect of the shin to the left lower extremity. The wound base is granular in nature. No erythema noted periwound. The wound does not probe to bone. No purulent drainage, tracking, tunneling, crepitus, fluctuance, or acute signs of infection noted. Full-thickness ulceration noted to the distal lateral aspect of the left foot. Measures approximately 3 cm x 2.4 cm x 0.3 cm the wound base is a mixture of granular, fibrotic, and ischemic tissue. Erythema noted periwound. The wound does probe to bone. No purulent drainage expressed. Wound does track about 2 cm medially and proximally. No crepitus or fluctuance noted. Slight malodor noted. Deep tissue injuries noted to the posterior aspect of the heel to the left lower extremity. No fluctuance or crepitus noted. No purulent drainage noted. No malodor noted. MUSCULOSKELETAL: Muscle strength is 4/5 for all pedal groups tested. Mild pain with palpation of the periwound areas to bilateral lower extremities. Ankle joint ROM is decreased in dorsiflexion with the knee extended. No obvious biomechanical abnormalities. .     IMAGING:  XR Tib/Fib Right (5/25/2022)  Narrative   RIGHT TIBIA FIBULA  4 views       HISTORY: Pain       COMPARISON: none       FINDINGS:       No evidence of acute fracture or traumatic bony abnormality is seen.       Osteopenia is noted.           Impression       No acute fracture seen. XR Left Foot (5/25/2022)  Narrative   EXAM: LEFT FOOT 3 VIEWS       INDICATION: Left 5th toe wound.       COMPARISON: 1/14/20       FINDINGS:       Wound or ulceration identified over the 5th MTP region.  There is loss of cortical margin laterally suggesting erosive changes in the neck and head of the metatarsal bone. Bones are otherwise severely osteopenic. No fracture identified. Extensive vascular    calcifications.           Impression       Acute osteomyelitis in the 5th metatarsal bone.         XR foot right (5/25/2022)    XR Tib/Fib Left (5/25/2022)    ASSESSMENT/PLAN:   -Osteomyelitis, left fifth metatarsal  -Cellulitis, right lower extremity  -Full-thickness ulceration, distal lateral left foot-Bueno stage III  -Full-thickness ulceration, anterior left leg-Bueno stage II  -Full-thickness ulceration, right leg-Bueno stage II  -Full-thickness ulcerations, distal lateral right foot-Bueno stage II  -Superficial thickness ulceration, right fifth digit-Bueno stage I  -Deep tissue injury, bilateral heels    -Patient seen and examined in the ED this pm  -Hypertensive otherwise VSS, No leukocytosis noted (WBC 9.3)  -ESR 48 and CRP 16.3  -lactic acid 0.8  -prealbumin ordered  -XR images reviewed, impression noted above  -XR images of the right foot and left tib-fib ordered; will follow up results  -Arterial duplex of the left lower extremity ordered  -MRI of the left foot ordered  -Wound culture obtained we will follow up results  -Patient provided verbal consent to perform sharp excisional debridement at today's encounter. Using a sterile #15 blade, the wound noted to the lateral aspect of the left foot was excisionally debrided down to and including fascia to healthy, bleeding tissue margins. Estimated blood loss less than 5 cc in total. Hemostasis obtained with direct pressure.  -Continue IV antibiotics per primary team; recommend changing Ancef to cefepime  -Dakin's ordered to patient's room, to be used at next dressing change  -Right lower extremity dressed with Adaptic, wet to dry gauze, DSD and Ace  -Left lower extremity dressed with Adaptic, Betadine soaked gauze to the wound, DSD and Ace   -Prevalon boots ordered .  Patient is to wear at all times while in bed to

## 2022-05-26 NOTE — PLAN OF CARE

## 2022-05-26 NOTE — PROGRESS NOTES
Hospitalist Progress Note      PCP: INES Ortiz    Date of Admission: 5/25/2022      Subjective:  Seen and examined  No new complaints      Medications:  Reviewed    Infusion Medications    sodium chloride 25 mL/hr at 05/26/22 1310     Scheduled Medications    cefepime  2,000 mg IntraVENous Q12H    vancomycin  750 mg IntraVENous Q12H    miconazole   Topical BID    [Held by provider] aspirin  81 mg Oral Daily    atorvastatin  80 mg Oral Nightly    gabapentin  600 mg Oral BID    levothyroxine  75 mcg Oral QAM AC    pantoprazole  40 mg Oral QAM AC    tamsulosin  0.8 mg Oral Daily    traZODone  25 mg Oral Nightly    vitamin B-12  1,000 mcg Oral Daily    sodium chloride flush  5-40 mL IntraVENous 2 times per day    sodium hypochlorite   Irrigation Daily     PRN Meds: sodium chloride flush, sodium chloride, ondansetron **OR** ondansetron, polyethylene glycol, acetaminophen **OR** acetaminophen, traMADol      Intake/Output Summary (Last 24 hours) at 5/26/2022 1810  Last data filed at 5/26/2022 1510  Gross per 24 hour   Intake 166.05 ml   Output 500 ml   Net -333.95 ml       Physical Exam Performed:    BP (!) 147/82   Pulse 66   Temp 98.1 °F (36.7 °C) (Oral)   Resp 18   Ht 5' 11\" (1.803 m)   Wt 218 lb 14.7 oz (99.3 kg)   SpO2 93%   BMI 30.53 kg/m²       General appearance:  No apparent distress, appears stated age and cooperative. Obese, chronically ill-appearing. HEENT:  Normal cephalic, atraumatic without obvious deformity. Pupils equal, round, and reactive to light. Extra ocular muscles intact. Conjunctivae/corneas clear. Neck: Supple, with full range of motion. No jugular venous distention. Trachea midline. Respiratory:  Normal respiratory effort. Clear to auscultation, bilaterally without Rales/Wheezes/Rhonchi. Cardiovascular:  Regular rate and rhythm with normal S1/S2 without murmurs, rubs or gallops.   Abdomen: Soft, non-tender, non-distended with normal bowel sounds. Musculoskeletal:  No clubbing, cyanosis or edema bilaterally. Full range of motion without deformity. Bilateral lower extremities wrapped, visible wounds overlying dressings, right > left leg  Skin: Skin color, texture, turgor normal.  Erythematous skin of bilateral groins and testicles. Chronic indwelling Iglesias +. No induration, fluctuance or testicular swelling  Neurologic:  Neurovascularly intact without any focal sensory/motor deficits. Cranial nerves: II-XII intact, grossly non-focal.  Psychiatric:  Alert and oriented, thought content appropriate, normal insight  Capillary Refill: Brisk,3 seconds, normal  Peripheral Pulses: +2 palpable, equal bilaterally     Labs:   Recent Labs     05/25/22 1848 05/26/22  0657   WBC 9.3 7.0   HGB 11.5* 11.0*   HCT 35.7* 34.1*    187     Recent Labs     05/25/22 1848 05/26/22  0657    141   K 4.5 4.4    108   CO2 24 23   BUN 25* 24*   CREATININE 0.9 0.8   CALCIUM 8.3 8.5     No results for input(s): AST, ALT, BILIDIR, BILITOT, ALKPHOS in the last 72 hours. No results for input(s): INR in the last 72 hours. No results for input(s): Wang Oiler in the last 72 hours. Urinalysis:      Lab Results   Component Value Date    NITRU POSITIVE 04/22/2022    WBCUA 21-50 04/22/2022    BACTERIA 4+ 04/22/2022    RBCUA 3-4 04/22/2022    BLOODU Negative 04/22/2022    SPECGRAV <=1.005 04/22/2022    GLUCOSEU Negative 04/22/2022       Radiology:  VL DUP LOWER EXTREMITY ARTERIES LEFT   Final Result      XR FOOT RIGHT (MIN 3 VIEWS)   Final Result      Limited radiographs without definite acute osseous abnormality. Consider repeat and provide information regarding specific area of concern. XR TIBIA FIBULA LEFT (2 VIEWS)   Final Result      No acute osseous findings. XR FOOT LEFT (MIN 3 VIEWS)   Final Result      Acute osteomyelitis in the 5th metatarsal bone. XR TIBIA FIBULA RIGHT (2 VIEWS)   Final Result      No acute fracture seen. MRI FOOT LEFT W WO CONTRAST    (Results Pending)           Assessment/Plan:    Active Hospital Problems    Diagnosis     Osteomyelitis ([facility] [address] [M86.9]      Priority: Medium     #Acute osteomyelitis of left fifth toe  #Chronic skin wounds through bilateral lower extremities with right lower extremity cellulitis  #Fungal infection/tinea cruris bilateral groins  #Chronic indwelling Iglesias catheter due to urinary retention/BPH  #Recent UTI with MDRO, has completed course of antibiotics  #Recent C. difficile infection without diarrhea, treated with oral vancomycin  #Chronic diastolic CHF without exacerbation  #Bedridden status, likely secondary to neuromuscular disease with spasticity  #History of paroxysmal A. fib  #Chronic medical conditions-essential hypertension, hyperlipidemia, GERD, carotid artery disease.     PLAN:  -Continue on vancomycin and Ancef  -Pharmacy consult for vancomycin dosing  -Pain relief as needed  -Podiatry consult, appreciate recommendations and assistance. Bedside I&D done  -Follow wound cultures  -Wound care consult  -Continue his nursing home medications appropriately  -Supportive therapy    DVT Prophylaxis: lovenox  Diet: ADULT DIET; Regular; 4 carb choices (60 gm/meal); Low Fat/Low Chol/High Fiber/2 gm Na  ADULT ORAL NUTRITION SUPPLEMENT; Breakfast, Dinner;  Wound Healing Oral Supplement  Code Status: Full Code    PT/OT Eval Status: n/a     Dispo - inpatient     Damian Iglesias MD

## 2022-05-26 NOTE — PROGRESS NOTES
Per protocol, patient schneider present on admission removed and replaced. Has chronic schneider at baseline.

## 2022-05-26 NOTE — PROGRESS NOTES
Clinical Pharmacy Progress Note    IV Vancomycin - Management by Pharmacy    Consult Date(s): 5/25/22  Consulting Provider(s): Dr. Deidra Rodriguez / Plan    osteomyelitis of left fifth toe - Vancomycin   Concurrent Antimicrobials: Cefepime   Day of Vanc Therapy: #1   Current Dosing Method: Bayesian-Guided AUC Dosing   Therapeutic Goal: 400-600 mg/L*hr   Current Dose / Frequency: 750 mg every 12 hours   Plan / Rationale:   o Patient received vanc 1500 mg IVx1 prior to this consult  o With the above regimen, an AUC of 504 mg/L.hr and trough of 16.0 mg/L are expected   Will continue to monitor clinical condition and make adjustments to regimen as appropriate. Thank you for consulting Pharmacy! Rod Judge, PharmD, BCPS      Interval update:     Subjective/Objective: Mr. Margie Le is a [de-identified] y.o. male with a PMHx significant for HTN, HLD, CAD, Chronic skin wounds, diastolic CHF, paroxysmal A. fib, Chronic indwelling Iglesias catheter for urinary retention/BPH, recent UTI with MDRO, Bedridden status, likely d/t neuromuscular disease with spasticity, admitted for osteomyelitis of left fifth toe. Pharmacy has been consulted to dose vancomycin. Ht Readings from Last 1 Encounters:   05/25/22 5' 11\" (1.803 m)     Wt Readings from Last 1 Encounters:   05/25/22 218 lb 14.7 oz (99.3 kg)       Current & Prior Antimicrobial Regimen(s):   Cefazolin 2000 mg x1   Cefepime 2000 mg every 12 hours   Vancomycin 1500 mg IVx1; then 750 mg every 12 hours    Level(s) / Doses:    Date Time Dose Level / Type of Level Interpretation                 Note: Serum levels collected for AUC-based dosing may be high if collected in close proximity to the dose administered. This is not necessarily indicative of toxicity.     Cultures & Sensitivities:    Date Site Micro Susceptibility / Result   5/25/22 Wound pending            Labs / Ancillary Data:    Estimated Creatinine Clearance: 79 mL/min (based on SCr of 0.9

## 2022-05-26 NOTE — PROGRESS NOTES
Clinical Pharmacy Progress Note    Vancomycin - Management by Pharmacy    Consult Date(s):  5/25/22  Consulting Provider(s):  Dr. Yasmani Stewart / Plan:  1) Left 5th metatarsal osteomyelitis, RLE cellulitis, multiple LE ulcers   - Vancomycin   Concurrent Antimicrobials: Cefefpime    Day of Vanc Therapy: 1   Current Dosing Method: Bayesian-guided AUC  o Therapeutic Goal: AUC = 400-600 mg/L*hr  o Received loading dose of 1500mg IV x1 overnight. Will continue with 750mg IV q12h. Regimen predicts an AUC = 504 and steady state trough = 16.  o Random level is ordered for 5/27 AM to further evaluate above regimen.  Will continue to monitor clinical condition and make adjustments to regimen as appropriate. Please call with questions--  Thanks--  John Costa, PharmD, BCPS, BCGP  [id number] ([facility])   5/26/2022 11:31 AM      Interval update:  ID evaluation pending. Subjective/Objective:   Sheldon Gore is a [de-identified] y.o. y/o male with a PMHx that includes BPH, carotid stenosis, HF, chronic back pain, GERD, A.fib, HLD, HTN, lower GI bleed, recent C.diff infection, and MDRO UTI's who is admitted with Left 5th metatarsal osteomyelitis, RLE cellulitis, multiple LE ulcers, and fungal infection / tinea cruris B/L groin. [facility] is consulted to dose Vancomycin. Ht Readings from Last 1 Encounters:   05/25/22 5' 11\" (1.803 m)     Wt Readings from Last 1 Encounters:   05/25/22 218 lb 14.7 oz (99.3 kg)       Current and Prior Antimicrobials:  Cefepime 2000mg EI q12h (5/26-current)  Vancomycin - Pharmacy to dose   1500mg IV x1 5/26 00:30   750mg IV q12h (5/26-current)    Vanc Level(s) / Doses:  Date Time Dose Level / Type of Level Interpretation   5/27 06:00 750mg q12h Random = ordered ·           Note: Serum levels collected for AUC-based dosing may be high if collected in close proximity to the dose administered. This is not necessarily an indicator of toxicity.     Recent Labs     05/25/22  2588 05/26/22  0657   CREATININE 0.9 0.8   BUN 25* 24*   WBC 9.3 7.0       Estimated Creatinine Clearance: 88 mL/min (based on SCr of 0.8 mg/dL).     Cultures & Sensitivities:  Date Site Micro Susceptibility / Result   5/25 Wound Sent

## 2022-05-27 ENCOUNTER — APPOINTMENT (OUTPATIENT)
Dept: VASCULAR LAB | Age: 82
DRG: 463 | End: 2022-05-27
Payer: MEDICARE

## 2022-05-27 PROBLEM — A49.02 MRSA INFECTION: Status: ACTIVE | Noted: 2022-05-27

## 2022-05-27 PROBLEM — M00.9 SEPTIC ARTHRITIS OF INTERPHALANGEAL JOINT OF TOE OF RIGHT FOOT (HCC): Status: ACTIVE | Noted: 2022-05-27

## 2022-05-27 PROBLEM — M86.371 CHRONIC MULTIFOCAL OSTEOMYELITIS OF RIGHT FOOT (HCC): Status: ACTIVE | Noted: 2022-05-27

## 2022-05-27 LAB — VANCOMYCIN RANDOM: 15.9 UG/ML

## 2022-05-27 PROCEDURE — 6360000002 HC RX W HCPCS: Performed by: INTERNAL MEDICINE

## 2022-05-27 PROCEDURE — 80202 ASSAY OF VANCOMYCIN: CPT

## 2022-05-27 PROCEDURE — 99223 1ST HOSP IP/OBS HIGH 75: CPT | Performed by: INTERNAL MEDICINE

## 2022-05-27 PROCEDURE — 36415 COLL VENOUS BLD VENIPUNCTURE: CPT

## 2022-05-27 PROCEDURE — 93922 UPR/L XTREMITY ART 2 LEVELS: CPT

## 2022-05-27 PROCEDURE — 1200000000 HC SEMI PRIVATE

## 2022-05-27 PROCEDURE — 6370000000 HC RX 637 (ALT 250 FOR IP): Performed by: INTERNAL MEDICINE

## 2022-05-27 PROCEDURE — 2580000003 HC RX 258: Performed by: INTERNAL MEDICINE

## 2022-05-27 RX ADMIN — CYANOCOBALAMIN TAB 1000 MCG 1000 MCG: 1000 TAB at 08:17

## 2022-05-27 RX ADMIN — GABAPENTIN 600 MG: 600 TABLET, FILM COATED ORAL at 21:39

## 2022-05-27 RX ADMIN — SODIUM CHLORIDE 10 ML: 9 INJECTION, SOLUTION INTRAVENOUS at 11:25

## 2022-05-27 RX ADMIN — ACETAMINOPHEN 650 MG: 325 TABLET ORAL at 21:38

## 2022-05-27 RX ADMIN — VANCOMYCIN HYDROCHLORIDE 750 MG: 10 INJECTION, POWDER, LYOPHILIZED, FOR SOLUTION INTRAVENOUS at 00:36

## 2022-05-27 RX ADMIN — DAKIN'S SOLUTION 0.125% (QUARTER STRENGTH): 0.12 SOLUTION at 08:22

## 2022-05-27 RX ADMIN — GABAPENTIN 600 MG: 600 TABLET, FILM COATED ORAL at 08:16

## 2022-05-27 RX ADMIN — CEFEPIME HYDROCHLORIDE 2000 MG: 2 INJECTION, POWDER, FOR SOLUTION INTRAVENOUS at 01:57

## 2022-05-27 RX ADMIN — MICONAZOLE NITRATE: 2 POWDER TOPICAL at 21:39

## 2022-05-27 RX ADMIN — SODIUM CHLORIDE, PRESERVATIVE FREE 10 ML: 5 INJECTION INTRAVENOUS at 21:38

## 2022-05-27 RX ADMIN — TAMSULOSIN HYDROCHLORIDE 0.8 MG: 0.4 CAPSULE ORAL at 08:17

## 2022-05-27 RX ADMIN — TRAZODONE HYDROCHLORIDE 25 MG: 50 TABLET, FILM COATED ORAL at 21:38

## 2022-05-27 RX ADMIN — ACETAMINOPHEN 650 MG: 325 TABLET ORAL at 00:39

## 2022-05-27 RX ADMIN — PANTOPRAZOLE SODIUM 40 MG: 40 TABLET, DELAYED RELEASE ORAL at 05:24

## 2022-05-27 RX ADMIN — VANCOMYCIN HYDROCHLORIDE 1500 MG: 10 INJECTION, POWDER, LYOPHILIZED, FOR SOLUTION INTRAVENOUS at 11:28

## 2022-05-27 RX ADMIN — CEFEPIME HYDROCHLORIDE 2000 MG: 2 INJECTION, POWDER, FOR SOLUTION INTRAVENOUS at 15:15

## 2022-05-27 RX ADMIN — SODIUM CHLORIDE, PRESERVATIVE FREE 10 ML: 5 INJECTION INTRAVENOUS at 08:20

## 2022-05-27 RX ADMIN — LEVOTHYROXINE SODIUM 75 MCG: 0.07 TABLET ORAL at 05:24

## 2022-05-27 RX ADMIN — TRAMADOL HYDROCHLORIDE 25 MG: 50 TABLET, COATED ORAL at 08:20

## 2022-05-27 RX ADMIN — TRAMADOL HYDROCHLORIDE 25 MG: 50 TABLET, COATED ORAL at 15:14

## 2022-05-27 RX ADMIN — MICONAZOLE NITRATE: 2 POWDER TOPICAL at 08:20

## 2022-05-27 RX ADMIN — ATORVASTATIN CALCIUM 80 MG: 80 TABLET, FILM COATED ORAL at 21:39

## 2022-05-27 ASSESSMENT — PAIN DESCRIPTION - PAIN TYPE: TYPE: CHRONIC PAIN

## 2022-05-27 ASSESSMENT — PAIN DESCRIPTION - ORIENTATION: ORIENTATION: LEFT

## 2022-05-27 ASSESSMENT — PAIN SCALES - GENERAL
PAINLEVEL_OUTOF10: 2
PAINLEVEL_OUTOF10: 0
PAINLEVEL_OUTOF10: 3
PAINLEVEL_OUTOF10: 8

## 2022-05-27 ASSESSMENT — PAIN DESCRIPTION - FREQUENCY: FREQUENCY: CONTINUOUS

## 2022-05-27 ASSESSMENT — PAIN DESCRIPTION - DESCRIPTORS: DESCRIPTORS: ACHING;NUMBNESS

## 2022-05-27 ASSESSMENT — PAIN DESCRIPTION - LOCATION: LOCATION: FOOT

## 2022-05-27 ASSESSMENT — PAIN DESCRIPTION - ONSET: ONSET: ON-GOING

## 2022-05-27 ASSESSMENT — PAIN - FUNCTIONAL ASSESSMENT: PAIN_FUNCTIONAL_ASSESSMENT: PREVENTS OR INTERFERES SOME ACTIVE ACTIVITIES AND ADLS
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
## 2022-05-27 NOTE — ADT AUTH CERT
Utilization Reviews         5/26 Wound Photos by Shital Ortiz RN       Review Status Review Entered   In Primary 5/27/2022 14:57      Criteria Review           Osteomyelitis - Care Day 2 (5/26/2022) by Shital Ortiz RN       Review Status Review Entered   Completed 5/27/2022 14:52      Criteria Review      Care Day: 2 Care Date: 5/26/2022 Level of Care: Telemetry    Guideline Day 2    Level Of Care    (X) Floor    5/27/2022 2:52 PM EDT by [facility]      teleemtry    Clinical Status    (X) * Afebrile or fever improved    5/27/2022 2:52 PM EDT by [facility]      temp 98.9    (X) * Oral intake tolerated    5/27/2022 2:52 PM EDT by [facility]      regular diet    Activity    (X) As tolerated    5/27/2022 2:52 PM EDT by [facility]      up with assist    Routes    (X) * Oral hydration    5/27/2022 2:52 PM EDT by [facility]      regular diet    (X) Parenteral medications    5/27/2022 2:52 PM EDT by [facility]      Lipitor 80mg PO nightly  Neurontin 600mg PO BID  Protonix 40mg PO daily  Flomax 0.8mg PO daily  NS 10ml/hr  Tylenol 650mg PO q6h PRN x 1  Tramadol 25mg PO q6h PRN x 1    (X) Usual diet    5/27/2022 2:52 PM EDT by [facility]      regular diet    Medications    (X) Parenteral antibiotics    5/27/2022 2:52 PM EDT by [facility]      Maxipime 2000mg IV q12h  Vancomycin 1500mg IV q24h  Vancomycin 750mg IV q12h    * Milestone   Additional Notes   DATE: 5/26/2022 - Care day 2   LOC - IP       Pertinent Updates:   Attending note:   No complaints   -Continue on vancomycin and Ancef   -Pharmacy consult for vancomycin dosing   -Pain relief as needed   -Podiatry consult, appreciate recommendations and assistance.  Bedside I&D done   -Follow wound cultures   -Wound care consult   -Continue his nursing home medications appropriately   -Supportive therapy         Vitals:98.9, HR 70, RR 18, /82, 93% on RA         Abnl/Pertinent Labs/Radiology/Diagnostic Studies:   BUN 24   HGB 11.0   HCT 34.1      MRI left foot:   1.  Acute osteomyelitis in the 5th metatarsal bone and proximal phalanx with septic arthritis at the MTP joint. 2.  Regional cellulitis and myositis. 3.  No evidence of abscess         Physical Exam:   Musculoskeletal:  No clubbing, cyanosis or edema bilaterally.  Full range of motion without deformity.  Bilateral lower extremities wrapped, visible wounds overlying dressings, right > left leg   Skin: Skin color, texture, turgor normal.  Erythematous skin of bilateral groins and testicles.  Chronic indwelling Iglesias +.  No induration, fluctuance or testicular swelling   Neurologic:  Neurovascularly intact without any focal sensory/motor deficits. Cranial nerves: II-XII intact, grossly non-focal.      MD Consults/Assessments & Plans:   Podiatry note:   Patient denies any overnight acute event. Patient denies f/c/n/v/sob/cp.    ASSESSMENT/PLAN   -Osteomyelitis, left fifth metatarsal   -Cellulitis, right lower extremity   -Serous bulla, left dorsal fourth interspace   -Full-thickness ulceration, distal lateral left foot-Bueno stage III   -Full-thickness ulceration, anterior left leg-Bueno stage II   -Full-thickness ulceration, right leg-Bueno stage II   -Full-thickness ulcerations, distal lateral right foot-Bueno stage II   -Superficial thickness ulceration, right fifth digit-Bueno stage I   -Deep tissue injury, bilateral heels       -Patient seen and examined at bedside this AM   -VSS, No leukocytosis noted (WBC 7.0)   -ESR 48 and CRP 16.3   -lactic acid 0.8   -prealbumin ordered   -XR images reviewed, impression noted above   -Arterial duplex of the left lower extremity ordered   -MRI of the left foot ordered   -Wound culture  2+ gram-positive cocci   -Continue IV antibiotics per primary team   -ID consulted, appreciate recommendations   -Right lower extremity dressed with Adaptic, Betadine soaked gauze, DSD, and Ace   -Left lower extremity dressed with Adaptic, Dakin's soaked gauze, DSD, and Ace   -Prevalon boots applied. Patient is to wear at all times while in bed to prevent further deep tissue injury.   -Patient is nonweightbearing at baseline       DISPO: Multiple ulcerations to bilateral lower extremity.  Osteomyelitis of the left fifth metatarsal.  Cellulitis right lower extremity      Orders: regular diet, contact isolation, elevate affected extremity, telemetry, up with assist, wound care q shift               pa recs inpt by Jeffrey Hernandez RN       Review Status Review Entered   In Primary 5/26/2022 15:08      Criteria Review   -----------------------------------------------------------------------------  [facility] TREATMENT [facility] Physician Advisor Recommendation    Based on the clinical acuity, complexity, hospital course, data review and physician/consultant impressions and findings noted below it is our recommendation to keep patient in INPATIENT status.  ---------------------------------------------------------------------------------  Summary of impressions and findings:     Clinical summary - 82yo sent from [facility] for groin redness. Foot wound. osteomyelitis, cellulitis. afeb, no wbcs. CRP of 16 and ESR 48. +IVABX awaiting id recs. Podiatry consult. Vitals -   Labs and imaging -  [facility] criteria - yes  Comments - Keep patient as Inpatient status. Chart reviewed and VUR notified. Britni Alvarado MD MPH  Physician [address]  Cell   [email or web address]

## 2022-05-27 NOTE — PROGRESS NOTES
Physician Progress Note      PATIENTTrmyron Perez  CSN #:                  [id number]  :                       1940  ADMIT DATE:       2022 4:34 PM  [address] DATE:  RESPONDING  PROVIDER #:        Matt Alaniz MD          QUERY TEXT:    Bedbound Patient admitted with Acute osteomyelitis of left toe and mulitple   ulcers BLE. Per Wound RN c/s  Stage 3 pressure ulcer to sacrum. If   possible, please document in progress notes and discharge summary the type of   ulcer, site of the ulcer and present on admission status of the ulcer: The medical record reflects the following:  Risk Factors: Bed bound  Clinical Indicators: Per Wound RN Pressure Stage 3 ulcer to sacrum. Treatment: Wound RN c/s, Cleanse w/ NS, alginate ag, change daily, cover with   foam dressing, Low air loss mattress  Options provided:  -- Decubitus Pressure Ulcer to sacrum present on admission  -- Other - I will add my own diagnosis  -- Disagree - Not applicable / Not valid  -- Disagree - Clinically unable to determine / Unknown  -- Refer to Clinical Documentation Reviewer    PROVIDER RESPONSE TEXT:    This patient has a decubitus pressure ulcer to sacrum that was present on   admission. Query created by:  Peewee Hess on 2022 2:36 PM      Electronically signed by:  Matt Alaniz MD 2022 9:47 AM

## 2022-05-27 NOTE — CONSULTS
Comprehensive Nutrition Assessment    RECOMMENDATIONS:  1. PO Diet: Continue regular; 4CC; cardiac diet  2. ONS: Start Lisandro BID    NUTRITION ASSESSMENT:   Nutritional summary & status: Rd consulted for wounds. Pt working with other medical staff at attempted encounters. Weight trending down for the last 4 months but no significant losses noted. RD to send wound healing ONS and will monitor po intakes and nutrition status throughout adm.  Admission/PMH: wounds / chronic skin wounds, chronic diastolic HF, bedridden, HTN, HLD, GERD, CAD     MALNUTRITION ASSESSMENT  Context of Malnutrition: Acute Illness   Malnutrition Status: Insufficient data  Findings of the 6 clinical characteristics of malnutrition (Minimum of 2 out of 6 clinical characteristics is required to make the diagnosis of moderate or severe Protein Calorie Malnutrition based on AND/ASPEN Guidelines):  Energy Intake: Unable to Assess    Energy Intake Time: Unable to assess   Weight Loss %: No significant loss   Weight loss Time: No significant      NUTRITION DIAGNOSIS   Increased nutrient needs related to increase demand for energy/nutrients as evidenced by wounds    [address] and/or Nutrient Delivery:  Start Oral Nutrition Supplement,Continue Current Diet  Nutrition Education/Counseling:  No recommendation at this time   Goals:  Pt will consistently consume >50% of meals and supplements throughout adm         Nutrition Monitoring and Evaluation:   Food/Nutrient Intake Outcomes:  Food and Nutrient Intake,Supplement Intake  Physical Signs/Symptoms Outcomes:  Weight,Biochemical Data     OBJECTIVE DATA: Significant to nutrition assessment  · Nutrition-Focused Physical Findings: stewart lbm  · Labs: Reviewed  · Meds: Reviewed; vitamin B-12  · Wounds: Multiple,Pressure Injury       CURRENT NUTRITION THERAPIES  ADULT DIET; Regular; 4 carb choices (60 gm/meal);  Low Fat/Low Chol/High Fiber/2 gm Na  ADULT ORAL NUTRITION SUPPLEMENT; Breakfast, Dinner; Wound Healing Oral Supplement     PO Intake: Unable to assess   PO Supplement Intake:None Ordered  Additional Sources of Calories/IVF:n/a     ANTHROPOMETRICS  Current Height: 5' 11\" (180.3 cm)  Current Weight: 218 lb 14.7 oz (99.3 kg)    Admission weight: 224 lb (101.6 kg)  Ideal Body Weight (IBW): 172 lbs  (78 kg)    Usual Bodyweight  (stewart)   Weight Changes stewart - wts fluctuating       BMI: 30.6    Wt Readings from Last 50 Encounters:   05/25/22 218 lb 14.7 oz (99.3 kg)   04/22/22 232 lb 12.8 oz (105.6 kg)   04/11/22 220 lb (99.8 kg)   02/05/22 205 lb 4 oz (93.1 kg)   01/20/22 207 lb 3.7 oz (94 kg)   12/28/21 185 lb (83.9 kg)   11/10/21 184 lb 4.8 oz (83.6 kg)   11/03/21 199 lb 4.7 oz (90.4 kg)   09/27/21 187 lb (84.8 kg)   09/26/21 187 lb (84.8 kg)   08/30/21 187 lb 9.6 oz (85.1 kg)   06/22/21 207 lb (93.9 kg)   05/31/21 213 lb (96.6 kg)       COMPARATIVE STANDARDS  Energy (kcal):  1793-9508     Protein (g):  117-133       Fluid (ml/day):  >1500 ml    The patient will still be monitored per nutrition standards of care. Consult dietitian if nutrition interventions essential to patient care is needed.      Matilda Delacruz, [address], [address] [place] [address] Drive:  [phone]  Office:  [phone]

## 2022-05-27 NOTE — PROGRESS NOTES
Hospitalist Progress Note      PCP: INES BLANDON [address]    Date of Admission: 5/25/2022    Chief Complaint:     Chief Complaint   Patient presents with    Other     Sent to ED from [address] [facility] [address] for redness and swelling to groin that pt reports he has had for a long time. Nursing home never called with report     Subjective:  Patient seen and examined at the bedside. No complaints at this time.   No acute events overnight  Continues on vancomycin and cefepime  Podiatry, ID following    PFHS: reviewed as documented 5/25/2022, no changes unless noted above    Medications:  Reviewed    Infusion Medications    sodium chloride 10 mL (05/27/22 1125)     Scheduled Medications    vancomycin  1,500 mg IntraVENous Q24H    cefepime  2,000 mg IntraVENous Q12H    miconazole   Topical BID    [Held by provider] aspirin  81 mg Oral Daily    atorvastatin  80 mg Oral Nightly    gabapentin  600 mg Oral BID    levothyroxine  75 mcg Oral QAM AC    pantoprazole  40 mg Oral QAM AC    tamsulosin  0.8 mg Oral Daily    traZODone  25 mg Oral Nightly    vitamin B-12  1,000 mcg Oral Daily    sodium chloride flush  5-40 mL IntraVENous 2 times per day    sodium hypochlorite   Irrigation Daily     PRN Meds: sodium chloride flush, sodium chloride, ondansetron **OR** ondansetron, polyethylene glycol, acetaminophen **OR** acetaminophen, traMADol      Intake/Output Summary (Last 24 hours) at 5/27/2022 1158  Last data filed at 5/27/2022 0204  Gross per 24 hour   Intake 240 ml   Output 1350 ml   Net -1110 ml       Physical Exam    /70   Pulse 67   Temp 97.1 °F (36.2 °C) (Oral)   Resp 19   Ht 5' 11\" (1.803 m)   Wt 218 lb 14.7 oz (99.3 kg)   SpO2 94%   BMI 30.53 kg/m²     General appearance:  No acute distress, appears stated age  Eyes: Pupils equal, round, reactive to light, conjunctiva/corneas clear  Ears/Nose/Mouth/Throat: No external lesions or scars, hearing intact to voice  Neck: Trachea midline, no masses noted, no thyromegaly  Respiratory:  Non-labored breathing, clear to auscultation bilaterally  Cardiovascular: Regular rate and rhythm, no murmurs, gallops, or rubs  Abdomen: soft, non-tender, non-distended  Musculoskeletal: Warm, well perfused, no cyanosis or edema  Skin: Left foot bandaged, photos and ID and podiatry note  Psychiatric: A&Ox4, good insight and judgment    Labs:   Recent Labs     05/25/22  1848 05/26/22  0657   WBC 9.3 7.0   HGB 11.5* 11.0*   HCT 35.7* 34.1*    187     Recent Labs     05/25/22  1848 05/26/22  0657    141   K 4.5 4.4    108   CO2 24 23   BUN 25* 24*   CREATININE 0.9 0.8   CALCIUM 8.3 8.5     No results for input(s): AST, ALT, BILIDIR, BILITOT, ALKPHOS in the last 72 hours. No results for input(s): INR in the last 72 hours. No results for input(s): [place] Amrita in the last 72 hours. Urinalysis:      Lab Results   Component Value Date    NITRU POSITIVE 04/22/2022    WBCUA 21-50 04/22/2022    BACTERIA 4+ 04/22/2022    RBCUA 3-4 04/22/2022    BLOODU Negative 04/22/2022    SPECGRAV <=1.005 04/22/2022    GLUCOSEU Negative 04/22/2022       Radiology:  MRI FOOT LEFT W WO CONTRAST   Final Result      1. Acute osteomyelitis in the 5th metatarsal bone and proximal phalanx with septic arthritis at the MTP joint. 2.  Regional cellulitis and myositis. 3.  No evidence of abscess. VL DUP LOWER EXTREMITY ARTERIES LEFT   Final Result      XR FOOT RIGHT (MIN 3 VIEWS)   Final Result      Limited radiographs without definite acute osseous abnormality. Consider repeat and provide information regarding specific area of concern. XR TIBIA FIBULA LEFT (2 VIEWS)   Final Result      No acute osseous findings. XR FOOT LEFT (MIN 3 VIEWS)   Final Result      Acute osteomyelitis in the 5th metatarsal bone. XR TIBIA FIBULA RIGHT (2 VIEWS)   Final Result      No acute fracture seen.          LASER SKIN PERFUSION PRESSURE STUDY    (Results Pending) Assessment/Plan:    Active Hospital Problems    Diagnosis Date Noted    Chronic multifocal osteomyelitis of right foot [facility]) [M86.371] 05/27/2022     Priority: Medium    MRSA infection [A49.02] 05/27/2022     Priority: Medium    Septic arthritis of interphalangeal joint of toe of right foot ([address] [M00.9] 05/27/2022     Priority: Medium    Osteomyelitis ([facility] [address] [M86.9] 05/25/2022     Priority: Medium       Plan:    #Acute osteomyelitis of the left fifth toe  #Chronic skin wounds  #Fungal infection/tinea curious  #Chronic indwelling Iglesias catheter  #Recent UTI with MDRO  #Recent C. difficile infection  #Chronic diastolic heart failure  #Bedridden  #History of paroxysmal A. fib  #Hypertension  #Hyperlipidemia  #GERD  #CAD  Appreciate ID dietary consultation  Bedside I&D done  Analgesia as needed  Continue vancomycin and cefepime  Follow cultures    # Remainder of medical conditions  -home management except as above    DVT Prophylaxis: Lovenox  Diet: ADULT DIET; Regular; 4 carb choices (60 gm/meal); Low Fat/Low Chol/High Fiber/2 gm Na  ADULT ORAL NUTRITION SUPPLEMENT; Breakfast, Dinner;  Wound Healing Oral Supplement  Code Status: Full Code    PT/OT Eval Status: Ongoing    Dispo: Aviva Zepeda pending clinical improvement    Vernon Lloyd MD

## 2022-05-27 NOTE — PROGRESS NOTES
Clinical Pharmacy Progress Note    Vancomycin - Management by Pharmacy    Consult Date(s):  5/25/22  Consulting Provider(s):  Dr. 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 Random level this AM = 15.9mcg/mL - calculated AUC = 560 with trough = 18.3.  o Given high estimated trough and risk of toxicity, will adjust dose to 1500mg IV q24h. Regimen provides AUC = 563 with t rough = 14.4. Also provide ease of once-daily dosing if patient requires prolonged course of Vanc.  o Will plan to check repeat level in 2-3 days.  Will continue to monitor clinical condition and make adjustments to regimen as appropriate. Please call with questions--  Thanks--  Howie Zazueta, PharmD, BCPS, BCGP  [id number] ([facility])   5/27/2022 8:48 AM      Interval update:  ID evaluation pending. Wound cultures growing MRSA. Subjective/Objective:   Nando Sims is a [de-identified] y.o. y/o male with a PMHx that includes BPH, carotid stenosis, HF, chronic back pain, GERD, A.fib, HLD, HTN, lower GI bleed, recent C.diff infection, and MDRO UTI's who is admitted with Left 5th metatarsal osteomyelitis, RLE cellulitis, multiple LE ulcers, and fungal infection / tinea cruris B/L groin. [facility] is consulted to dose Vancomycin.     Ht Readings from Last 1 Encounters:   05/25/22 5' 11\" (1.803 m)     Wt Readings from Last 1 Encounters:   05/25/22 218 lb 14.7 oz (99.3 kg)       Current and Prior Antimicrobials:  Cefepime 2000mg EI q12h (5/26-current)  Vancomycin - Pharmacy to dose   1500mg IV x1 5/26 00:30   750mg IV q12h (5/26-5/27)   1500mg IV q24h (5/27-current)    Vanc Level(s) / Doses:  Date Time Dose Level / Type of Level Interpretation   5/27 07:09 750mg q12h Random = 15.9 mcg/mL · Drawn ~ 18h after 3rd dose  · Calculated AUC = 560 with trough = 18.3  · Adjust to 1500mg q24h          Note: Serum levels collected for AUC-based dosing may be high if collected in close proximity to the dose administered. This is not necessarily an indicator of toxicity. Recent Labs     05/25/22  1848 05/26/22  0657   CREATININE 0.9 0.8   BUN 25* 24*   WBC 9.3 7.0       Estimated Creatinine Clearance: 88 mL/min (based on SCr of 0.8 mg/dL).     Cultures & Sensitivities:  Date Site Micro Susceptibility / Result   5/25 Wound Mixed skin anne, heavy growth  MRSA, moderate growth No further w/u  YANCY's pending

## 2022-05-27 NOTE — PROGRESS NOTES
Podiatric Surgery Daily Progress Note      Admit Date: 5/25/2022      Code:Full Code    Patient seen and examined, labs and records reviewed    Subjective:     Patient seen at bedside this AM.  Patient denies any overnight acute event. Patient denies f/c/n/v/sob/cp. Review of Systems: A 12 point review of symptoms is unremarkable with the exception of the chief complaint. Patient specifically denies nausea, fever, vomiting, chills, shortness of breath, chest pain, abdominal pain, constipation or difficulty urinating. Objective     /66   Pulse 61   Temp 97.8 °F (36.6 °C) (Oral)   Resp 16   Ht 5' 11\" (1.803 m)   Wt 218 lb 14.7 oz (99.3 kg)   SpO2 93%   BMI 30.53 kg/m²      I/O:    Intake/Output Summary (Last 24 hours) at 5/27/2022 0950  Last data filed at 5/27/2022 0204  Gross per 24 hour   Intake 360 ml   Output 1350 ml   Net -990 ml              Wt Readings from Last 3 Encounters:   05/25/22 218 lb 14.7 oz (99.3 kg)   04/22/22 232 lb 12.8 oz (105.6 kg)   04/11/22 220 lb (99.8 kg)       LABS:    Recent Labs     05/25/22  1848 05/26/22  0657   WBC 9.3 7.0   HGB 11.5* 11.0*   HCT 35.7* 34.1*    187        Recent Labs     05/26/22  0657      K 4.4      CO2 23   BUN 24*   CREATININE 0.8      No results for input(s): PROT, INR, APTT in the last 72 hours. LOWER EXTREMITY EXAMINATION    Dressing to bilateral LE intact. No strikethrough noted to the external dressing. Mild serosanguineous drainage noted to the internal layers of the dressing. VASCULAR: DP and PT pulses nonpalpable 0/4 b/l. Upon handheld doppler examination, DP and PT pulses were noted to have biphasic signals b/l. CFT is brisk to the digits of the foot b/l. Skin temperature is warm to wark from proximal to distal with focal calor noted right lower extremity. +2 pitting edema noted bilateral lower extremities, right greater than left.  No pain with calf compression b/l.     NEUROLOGIC: Gross and epicritic sensation is intact b/l. Protective sensation is diminished at all pedal sites b/l.     DERMATOLOGIC: Diffuse dermatologic changes noted to b/l LE with erythema present from just proximal to the ankle joint to just distal to the knee joint which does not resolve with extremity elevation.      Patient provided verbal consent for photos to be obtained today:  Right lower extremity:      Multiple full-thickness ulcerations noted to the anterior aspect and anterior lateral aspect of the right leg. Wound base is a combination of granular, and fibrotic tissue. Periwound erythema noted. No purulent drainage present. No malodor noted. No fluctuance or crepitus noted.     Superficial ulceration noted to the dorsal medial aspect of the right fifth digit. No acute signs of infection noted.       Full-thickness ulceration noted at the fifth metatarsal head. Wound base is a combination of granular and necrotic tissue. Wound measures approximately 2 cm x 2 cm x 0.1 cm. No purulent drainage expressed. No tracking, tunneling, crepitus, fluctuance, or malodor noted.     Full-thickness ulceration noted just proximal to the above mentioned wound on the fifth metatarsal.  Wound base is granular in nature. The wound measures approximately 2 cm x 1.5 cm x 0.1 cm. No purulent drainage expressed. No tracking, tunneling, crepitus, fluctuance, or malodor noted. Deep tissue injury noted to the posterior lateral aspect of the right lower extremity. No fluctuance or crepitus noted. No purulent drainage expressed. No malodor noted.     Left lower extremity:    Full-thickness ulceration noted to the anterior aspect of the shin to the left lower extremity. The wound base is granular in nature. No erythema noted periwound. The wound does not probe to bone.   No purulent drainage, tracking, tunneling, crepitus, fluctuance, or acute signs of infection noted.       Full-thickness ulceration noted to the distal lateral aspect of the left foot. Measures approximately 3 cm x 2.4 cm x 0.3 cm the wound base is a mixture of granular, fibrotic, and ischemic tissue. Erythema noted periwound. The wound does probe to bone. No purulent drainage expressed. Wound does track about 2 cm medially and proximally. No crepitus or fluctuance noted. Slight malodor noted. Deep tissue injuries noted to the posterior aspect of the heel to the left lower extremity. No fluctuance or crepitus noted. No purulent drainage noted. No malodor noted.     MUSCULOSKELETAL: Muscle strength is 4/5 for all pedal groups tested. Mild pain with palpation of the periwound areas to bilateral lower extremities. Ankle joint ROM is decreased in dorsiflexion with the knee extended. No obvious biomechanical abnormalities. IMAGING:  XR Tib/Fib Right (5/25/2022)  Narrative   RIGHT TIBIA FIBULA  4 views       HISTORY: Pain       COMPARISON: none       FINDINGS:       No evidence of acute fracture or traumatic bony abnormality is seen.       Osteopenia is noted.           Impression       No acute fracture seen.      XR Left Foot (5/25/2022)  Narrative   EXAM: LEFT FOOT 3 VIEWS       INDICATION: Left 5th toe wound.       COMPARISON: 1/14/20       FINDINGS:       Wound or ulceration identified over the 5th MTP region. There is loss of cortical margin laterally suggesting erosive changes in the neck and head of the metatarsal bone. Bones are otherwise severely osteopenic. No fracture identified. Extensive vascular    calcifications.           Impression       Acute osteomyelitis in the 5th metatarsal bone.          XR foot right (5/25/2022)  Narrative   EXAM: RIGHT FOOT 3 VIEWS       INDICATION: Right foot wound       COMPARISON: None       FINDINGS:       Suboptimal positioning. Artifact from patient motion on the oblique view. Findings significantly limited exam in conjunction with the severe osteopenia. No definite erosion or fracture seen.  There is diffuse soft tissue swelling. No definite soft tissue    gas.           Impression       Limited radiographs without definite acute osseous abnormality. Consider repeat and provide information regarding specific area of concern. XR Tib/Fib Left (5/25/2022)  Narrative   EXAM: LEFT TIBIA-FIBULA 2 VIEWS       INDICATION: Left leg wound       COMPARISON: None       FINDINGS:       Bones are diffusely osteopenic. No acute fracture or malalignment. No erosions seen. Diffuse vascular calcifications. Diffuse soft tissue swelling.           Impression       No acute osseous findings. Arterial Duplex Left Lower Extremity (5/26/2022)  Arterial Duplex Scan: Grayscale and color imaging of the extremity arteries,   including Doppler spectral waveform analysis.       Impressions   Right Impression   The ankle/brachial index is non-compressible (, PT >260mmHg). Left Impression   The ankle/brachial index is 1.13 (, PT 158mmHg). Multiphasic flow in the common femoral artery indicates no significant   aorto-iliac inflow disease. Atherosclerotic plaque in the common femoral artery and popliteal artery is   consistent with <50% stenosis. The superficial femoral artery appears to have multiphasic Doppler signals,   but is difficult to see in b-mode due to edema. The tibial trunk is not visualized, possibly due to edema, and the peroneal   artery is poorly seen, and is possibly patent. No previous left leg arterial duplex studies for comparison.       Conclusions        Summary        Nondiagnostic right ROSARIO due to vessel noncompressibility.    Normal left ROSARIO at rest.        Less than 50% stensoes of CFA and popliteal artery however this study is not    diagnostic due to imaging difficulties associated with leg edema. Clinical    correlation recommended.      MRI Left Foot (5/27/2022)  Narrative   MRI FOOT LEFT W WO CONTRAST       Indication: Osteomyelitis of 5th metatarsal, left foot       Comparison: Radiograph 5/25/22       Technique: Multiplanar multisequence MR imaging of the left foot was performed without intravenous contrast.        Findings:       Wound or ulceration laterally over the forefoot 5th MTP joint region. 5th metatarsal head and neck erosions, confluent marrow placement and postcontrast enhancement extending as far proximally as the mid shaft consistent with acute osteomyelitis. Enhancement/synovitis in the MTP joint and diffuse marrow replacement in the proximal phalanx, consistent with septic arthritis and osteomyelitis, respectively.       Cellulitis and myositis in the soft tissues around the infected bone. No evidence of abscess. There is more diffuse nonspecific edema signal extending over the dorsum of the foot in the subcutaneous space. No significant tenosynovitis. Extensive diffuse    atrophy throughout the intrinsic foot muscles consistent with chronic denervation.           Impression       1.  Acute osteomyelitis in the 5th metatarsal bone and proximal phalanx with septic arthritis at the MTP joint. 2.  Regional cellulitis and myositis. 3.  No evidence of abscess.             ASSESSMENT/PLAN  -Osteomyelitis, left fifth metatarsal  -Cellulitis, right lower extremity  -Serous bulla, left dorsal fourth interspace  -Full-thickness ulceration, distal lateral left foot-Bueno stage III  -Full-thickness ulceration, anterior left leg-Bueno stage II  -Full-thickness ulceration, right leg-Bueno stage II  -Full-thickness ulcerations, distal lateral right foot-Bueno stage II  -Superficial thickness ulceration, right fifth digit-Bueno stage I  -Deep tissue injury, bilateral heels    -Patient seen and examined at bedside this AM  -VSS, No updated CBC this a.m.  -ESR 48 and CRP 16.3  -lactic acid 0.8  -prealbumin 12.4; dietitian is on board  -XR and MRI imaging reviewed, impression noted above  -Arterial duplex reviewed, impression noted above  -Since arterial duplex was nondiagnostic will order laser perfusion study to assess wound healing potential to left lower extremity  -Laser perfusion study ordered; will follow up results  -Wound culture: MRSA  -Continue IV antibiotics per primary team  -ID consulted, appreciate recommendations  -Right lower extremity dressed with Adaptic, Betadine soaked gauze, DSD, and Ace  -Left lower extremity dressed with Adaptic, Dakin's soaked gauze, DSD, and Ace  -Prevalon boots re-applied. Patient is to wear at all times while in bed to prevent further deep tissue injury.  -Patient is nonweightbearing at baseline    DISPO: Multiple ulcerations to bilateral lower extremity. Osteomyelitis of the left fifth metatarsal.  Cellulitis right lower extremity. Labs and imaging reviewed. Laser perfusion study ordered. ID consulted; appreciate recommendations. We will continue to follow patient while in-house. Patient assessment and plan was discussed with Dr. Renan Pastrana DPM.    Luis Felipe Rose DPM   Podiatric Resident PGY1  Pager [phone] or Perfect[employer]erve  5/27/2022, 9:50 AM      Patient was seen and evaluated at bedside. Agree with residents assessment and treatment plan.   Renan Pastrana DPM

## 2022-05-27 NOTE — CARE COORDINATION
Case Management Assessment           Initial Evaluation                Date / Time of Evaluation: 5/27/2022 4:38 PM                 Assessment Completed by: ARIC Tinsley LSW    Patient Name: Najma Faulkner     YOB: 1940  Diagnosis: Osteomyelitis [facility]) [M86.9]  Osteomyelitis of left foot, unspecified type [facility]) [M86.9]     Date / Time: 5/25/2022  4:34 PM    Patient Admission Status: Inpatient    If patient is discharged prior to next notation, then this note serves as note for discharge by case management. [facility] Patient: No    Chart Reviewed: Yes  Patient/ Family Interviewed: Yes    Initial assessment completed at bedside with: patient    Hospitalization in the last 30 days: Yes    Emergency Contacts:  Extended Emergency Contact Information  Primary Emergency Contact: Lai Serrano [address] Phone: [phone]  Relation: Child  Secondary Emergency Contact: [place] [address] Phone: [phone]  Work Phone: [phone]  Mobile Phone: [phone]  Relation: Child    Advance Directives:   Code Status: Full [address]: No    Financial  Payor: Kell Kan / Plan: [employer] PLUS HMO / Product Type: *No Product type* /     Pre-cert required for SNF: [address] [facility] [phone] - F [phone]  [address] [place] [address]  Phone: [phone] Fax: [phone]      Potential assistance Purchasing Medications: Potential Assistance Purchasing Medications: No  Does Patient want to participate in local refill/ meds to beds program?: Yes    Meds To Beds General Rules:  1. Can ONLY be done Monday- Friday between 8:30am-5pm  2. Prescription(s) must be in pharmacy by 3pm to be filled same day  3. Copy of patient's insurance/ prescription drug card and patient face sheet must be sent along with the prescription(s)  4.  Cost of Rx cannot be added to hospital bill. If financial assistance is needed, please contact unit  or ;  or  CANNOT provide pharmacy voucher for patients co-pays  5. Patients can then  the prescription on their way out of the hospital at discharge, or pharmacy can deliver to the bedside if staff is available. (payment due at time of pick-up or delivery - cash, check, or card accepted)     Able to afford home medications/ co-pay costs: Yes    ADLS  Support Systems: Family Members,Friends/Neighbors    PT AM-PAC:   /24  OT AM-PAC:   /24    [place]: assisted living  Steps: NA    Plans to RETURN to current housing: Yes  Barriers to RETURNING to current housing: NA    Home Oxygen and [address] [place] prior to admission: No    Dialysis  Active with HD/PD prior to admission: No      DISCHARGE PLAN:  Disposition:  [address]: Tania Munoz [facility] Phone: [phone] Fax: [phone]    Transportation PLAN for discharge: EMS transportation     Factors facilitating achievement of predicted outcomes: Pleasant    Barriers to discharge: Medical complications    Additional Case Management Notes:  Pt is from [facility], will return at [place]. CM left  for admissions. CM will continue to follow for DC needs and recs. The Plan for Transition of Care is related to the following treatment goals of Osteomyelitis ([address] [M86.9]  Osteomyelitis of left foot, unspecified type ([address] [M86.9]    The Patient and/or patient representative Charlie Douglas and his family were provided with a choice of provider and agrees with the discharge plan Yes    Freedom of choice list was provided with basic dialogue that supports the patient's individualized plan of care/goals and shares the quality data associated with the providers.  Yes    Care Transition patient: No    Andrei Chua, ARIC, ARIELW  [facility]  Case Management Department  Ph: [phone]   Fax: [phone]

## 2022-05-28 ENCOUNTER — APPOINTMENT (OUTPATIENT)
Dept: GENERAL RADIOLOGY | Age: 82
DRG: 463 | End: 2022-05-28
Payer: MEDICARE

## 2022-05-28 ENCOUNTER — ANESTHESIA EVENT (OUTPATIENT)
Dept: OPERATING ROOM | Age: 82
DRG: 463 | End: 2022-05-28
Payer: MEDICARE

## 2022-05-28 LAB
GRAM STAIN RESULT: ABNORMAL
ORGANISM: ABNORMAL
TOTAL CK: 41 U/L (ref 39–308)
WOUND/ABSCESS: ABNORMAL
WOUND/ABSCESS: ABNORMAL

## 2022-05-28 PROCEDURE — 82550 ASSAY OF CK (CPK): CPT

## 2022-05-28 PROCEDURE — 86850 RBC ANTIBODY SCREEN: CPT

## 2022-05-28 PROCEDURE — 6360000002 HC RX W HCPCS: Performed by: INTERNAL MEDICINE

## 2022-05-28 PROCEDURE — 86901 BLOOD TYPING SEROLOGIC RH(D): CPT

## 2022-05-28 PROCEDURE — 1200000000 HC SEMI PRIVATE

## 2022-05-28 PROCEDURE — 2580000003 HC RX 258: Performed by: INTERNAL MEDICINE

## 2022-05-28 PROCEDURE — 99233 SBSQ HOSP IP/OBS HIGH 50: CPT | Performed by: INTERNAL MEDICINE

## 2022-05-28 PROCEDURE — 6370000000 HC RX 637 (ALT 250 FOR IP): Performed by: INTERNAL MEDICINE

## 2022-05-28 PROCEDURE — 86900 BLOOD TYPING SEROLOGIC ABO: CPT

## 2022-05-28 PROCEDURE — 36415 COLL VENOUS BLD VENIPUNCTURE: CPT

## 2022-05-28 PROCEDURE — 71046 X-RAY EXAM CHEST 2 VIEWS: CPT

## 2022-05-28 RX ADMIN — TRAZODONE HYDROCHLORIDE 25 MG: 50 TABLET, FILM COATED ORAL at 20:35

## 2022-05-28 RX ADMIN — ACETAMINOPHEN 650 MG: 325 TABLET ORAL at 08:50

## 2022-05-28 RX ADMIN — TRAMADOL HYDROCHLORIDE 25 MG: 50 TABLET, COATED ORAL at 05:22

## 2022-05-28 RX ADMIN — TAMSULOSIN HYDROCHLORIDE 0.8 MG: 0.4 CAPSULE ORAL at 08:52

## 2022-05-28 RX ADMIN — PANTOPRAZOLE SODIUM 40 MG: 40 TABLET, DELAYED RELEASE ORAL at 05:22

## 2022-05-28 RX ADMIN — POLYETHYLENE GLYCOL 3350 17 G: 17 POWDER, FOR SOLUTION ORAL at 14:35

## 2022-05-28 RX ADMIN — MICONAZOLE NITRATE: 2 POWDER TOPICAL at 08:52

## 2022-05-28 RX ADMIN — DAPTOMYCIN 450 MG: 500 INJECTION, POWDER, LYOPHILIZED, FOR SOLUTION INTRAVENOUS at 18:20

## 2022-05-28 RX ADMIN — SODIUM CHLORIDE, PRESERVATIVE FREE 10 ML: 5 INJECTION INTRAVENOUS at 08:53

## 2022-05-28 RX ADMIN — ATORVASTATIN CALCIUM 80 MG: 80 TABLET, FILM COATED ORAL at 20:35

## 2022-05-28 RX ADMIN — GABAPENTIN 600 MG: 600 TABLET, FILM COATED ORAL at 08:50

## 2022-05-28 RX ADMIN — CEFEPIME HYDROCHLORIDE 2000 MG: 2 INJECTION, POWDER, FOR SOLUTION INTRAVENOUS at 14:07

## 2022-05-28 RX ADMIN — LEVOTHYROXINE SODIUM 75 MCG: 0.07 TABLET ORAL at 05:22

## 2022-05-28 RX ADMIN — TRAMADOL HYDROCHLORIDE 25 MG: 50 TABLET, COATED ORAL at 13:32

## 2022-05-28 RX ADMIN — CEFEPIME HYDROCHLORIDE 2000 MG: 2 INJECTION, POWDER, FOR SOLUTION INTRAVENOUS at 02:08

## 2022-05-28 RX ADMIN — GABAPENTIN 600 MG: 600 TABLET, FILM COATED ORAL at 20:35

## 2022-05-28 RX ADMIN — SODIUM CHLORIDE, PRESERVATIVE FREE 10 ML: 5 INJECTION INTRAVENOUS at 20:36

## 2022-05-28 RX ADMIN — MICONAZOLE NITRATE: 2 POWDER TOPICAL at 20:36

## 2022-05-28 RX ADMIN — DAKIN'S SOLUTION 0.125% (QUARTER STRENGTH): 0.12 SOLUTION at 08:53

## 2022-05-28 RX ADMIN — CYANOCOBALAMIN TAB 1000 MCG 1000 MCG: 1000 TAB at 08:50

## 2022-05-28 ASSESSMENT — PAIN SCALES - GENERAL
PAINLEVEL_OUTOF10: 4
PAINLEVEL_OUTOF10: 0
PAINLEVEL_OUTOF10: 3

## 2022-05-28 ASSESSMENT — ENCOUNTER SYMPTOMS
EYE DISCHARGE: 0
RHINORRHEA: 0
ABDOMINAL PAIN: 0
WHEEZING: 0
COUGH: 0
SHORTNESS OF BREATH: 0
TROUBLE SWALLOWING: 0
BACK PAIN: 0
EYE REDNESS: 0
CONSTIPATION: 0
NAUSEA: 0
DIARRHEA: 0
SORE THROAT: 0

## 2022-05-28 ASSESSMENT — PAIN DESCRIPTION - LOCATION
LOCATION: FOOT

## 2022-05-28 ASSESSMENT — PAIN DESCRIPTION - DESCRIPTORS
DESCRIPTORS: BURNING
DESCRIPTORS: BURNING
DESCRIPTORS: ACHING

## 2022-05-28 ASSESSMENT — PAIN DESCRIPTION - ORIENTATION
ORIENTATION: RIGHT
ORIENTATION: LEFT

## 2022-05-28 ASSESSMENT — PAIN - FUNCTIONAL ASSESSMENT
PAIN_FUNCTIONAL_ASSESSMENT: PREVENTS OR INTERFERES SOME ACTIVE ACTIVITIES AND ADLS

## 2022-05-28 ASSESSMENT — PAIN DESCRIPTION - FREQUENCY
FREQUENCY: INTERMITTENT
FREQUENCY: CONTINUOUS

## 2022-05-28 ASSESSMENT — PAIN DESCRIPTION - PAIN TYPE
TYPE: ACUTE PAIN
TYPE: ACUTE PAIN

## 2022-05-28 ASSESSMENT — PAIN DESCRIPTION - ONSET
ONSET: GRADUAL
ONSET: ON-GOING

## 2022-05-28 NOTE — PROGRESS NOTES
Alert and oriented. VSS on RA. Rating BLE aching, burning pain 4-7/10. Resolves with PRN tramadol and rest. BLE dressings, changed per podiatry in AM, CDI with ace wraps in place. Continues on IV ABX and tolerating well. Turned and repositioned with wedge pillow for comfort.  NPO after MN for surgical procedure of Left 5th metatarsal.

## 2022-05-28 NOTE — PROGRESS NOTES
Clinical Pharmacy Consult Note    Pharmacy was consulted by Dr. Kati Braga to dose vancomycin for a Left 5th metatarsal osteomyelitis, RLE cellulitis, multiple LE ulcers   . We will sign off of the case, as vancomycin has since been discontinued. Please consider consulting Pharmacy again if vancomycin is re-started. Thank you for allowing us to participate in the care of this patient.     Thomas Hendrickson, PharmD  5/28/2022

## 2022-05-28 NOTE — ACP (ADVANCE CARE PLANNING)
Advanced Care Planning Note. Purpose of Encounter: Advanced care planning in light of left foot OM  Parties In Attendance: Patient  Decisional Capacity: Yes  Subjective: Patient denies CP and SOB  Objective: Cr 0.8 on   Goals of Care Determination: Patient wants full support (CPR, vent, surgery, HD, trach, PEG)  Plan:  IV Abx. ID and Podiatry consult. OR tomorrow  Code Status: Full code   Time spent on Advanced care Plannin minutes  Advanced Care Planning Documents: Completed advanced directives on chart, son is the POA.     Ladonna Frias MD  2022 1:26 PM

## 2022-05-28 NOTE — PROGRESS NOTES
Pre-Operative Note  Resident Note     Patient: Gil Sluder     Procedure: L Foot Partial 5th Ray Resection with Incision and Drainage of all non-viable soft tissue and bone. Consent: Written consent was obtained and placed in patients chart    Labs:      Recent Labs     05/25/22 1848 05/26/22  0657   WBC 9.3 7.0   HGB 11.5* 11.0*   HCT 35.7* 34.1*   MCV 92.5 92.4    187        Recent Labs     05/25/22 1848 05/26/22  0657    141   K 4.5 4.4    108   CO2 24 23   BUN 25* 24*   CREATININE 0.9 0.8      No results for input(s): AST, ALT, ALB, BILIDIR, BILITOT, ALKPHOS in the last 72 hours. No results for input(s): LIPASE, AMYLASE in the last 72 hours. No results for input(s): PROT, INR, APTT in the last 72 hours. No results for input(s): CKTOTAL, CKMB, CKMBINDEX, TROPONINI in the last 72 hours. Saline lock/IV access: Yes    Clearance for surgery: Yes, per internal medicine     Diet: NPO at midnight    CXR: Unchanged appearance of small loculated left pleural effusion or empyema.       EKG: See cardio section of EMR    Echocardiogram: not done    PT/INR: 15.4/1.22    Abx: Vancomycin and Cefepime    Type and Screen: A Pos/Neg    Known risk factors for perioperative complications:   CHF and HTN    Anesthesia to see patient    Cary Blandon DPM  Podiatric Resident, PGY-1  Pager #: [phone] or Perfect Serve

## 2022-05-28 NOTE — PROGRESS NOTES
Infectious Diseases   Progress Note      Admission Date: 5/25/2022  Hospital Day: Hospital Day: 4   Attending: Vishnu Machuca MD  Date of service: 5/28/2022     Chief complaint/ Reason for consult:     · Complicated left foot infection with osteomyelitis of th eleft  fifth metatarsal bone and left proximal phalanx--wound culture positive for MRSA  · Right lower extremity cellulitis   · Elevated sed rate   · Elevated CRP  · Essential hypertension    Microbiology:        I have reviewed allavailable micro lab data and cultures    · Left foot wound culture  - collected on 5/25/2022: MRSA    Susceptibility      Staph aureus mrsa (1)    Antibiotic Interpretation Microscan  Method Status    ceFAZolin Resistant 8 mcg/mL BACTERIAL SUSCEPTIBILITY PANEL BY YANCY     clindamycin Sensitive <=0.5 mcg/mL BACTERIAL SUSCEPTIBILITY PANEL BY YANCY     DAPTOmycin Sensitive 1 mcg/mL BACTERIAL SUSCEPTIBILITY PANEL BY YANCY     erythromycin Resistant >4 mcg/mL BACTERIAL SUSCEPTIBILITY PANEL BY YANCY     linezolid Sensitive 4 mcg/mL BACTERIAL SUSCEPTIBILITY PANEL BY YANCY     oxacillin Resistant >2 mcg/mL BACTERIAL SUSCEPTIBILITY PANEL BY YANCY     tetracycline Sensitive <=4 mcg/mL BACTERIAL SUSCEPTIBILITY PANEL BY YANCY     trimethoprim-sulfamethoxazole Sensitive <=0.5/9.5 mcg/mL BACTERIAL SUSCEPTIBILITY PANEL BY YANCY     vancomycin Sensitive 2 mcg/mL BACTERIAL SUSCEPTIBILITY PANEL BY YANCY         Antibiotics and immunizations:       Current antibiotics: All antibiotics and their doses were reviewed by me    Recent Abx Admin                   cefepime (MAXIPIME) 2000 mg IVPB minibag in NS (mg) 2,000 mg New Bag 05/28/22 1407     2,000 mg New Bag  0208    vancomycin (VANCOCIN) 1,500 mg in dextrose 5 % 250 mL IVPB ()  Restarted 05/28/22 1221                  Immunization History: All immunization history was reviewed by me today.     Immunization History   Administered Date(s) Administered    COVID-Tee, Manuel Hayes, Primary or Immunocompromised, PF, 100mcg/0.5mL 05/17/2021    Influenza A ([id number]) Vaccine PF IM 12/10/2009    Influenza Vaccine, unspecified formulation 12/13/2012, 11/06/2014, 10/13/2015, 09/01/2016, 11/03/2017, 10/26/2018, 09/20/2019    Influenza Virus Vaccine 12/19/2005, 12/14/2006, 12/13/2012, 11/30/2013, 10/13/2015, 09/20/2019    Influenza Whole 10/01/2007, 09/02/2010, 09/01/2011, 12/13/2012, 10/13/2015    Influenza, High Dose (Fluzone 65 yrs and older) 11/06/2014, 10/20/2020    Influenza, Quadv, IM, PF (6 mo and older Fluzone, Flulaval, Fluarix, and 3 yrs and older Afluria) 11/04/2021    PPD Test 09/16/1997    Pneumococcal Conjugate 13-valent (Zjahigh99) 12/29/2014    Pneumococcal Conjugate 7-valent (Prevnar7) 12/29/2014    Pneumococcal Polysaccharide ([id number]) 01/27/2014       Known drug allergies: All allergies were reviewed and updated    Allergies   Allergen Reactions    Bactrim [Sulfamethoxazole-Trimethoprim] Rash       Social history:     Social History:  All social andepidemiologic history was reviewed and updated by me today as needed. · Tobacco use:   reports that he quit smoking about 52 years ago. He has never used smokeless tobacco.  · Alcohol use:   reports no history of alcohol use. · Currently lives in: [address] [place] [address]  ·  reports no history of drug use.      COVID VACCINATION AND LAB RESULT RECORDS:     Internal Administration   First Dose COVID-19, Moderna, Primary or Immunocompromised, PF, 100mcg/0.5mL  05/17/2021   Second Dose           Last COVID Lab SARS-CoV-2, NAAT (no units)   Date Value   01/27/2022 Not Detected            Assessment:     The patient is a [de-identified] y.o. old male who  has a past medical history of BPH (benign prostatic hyperplasia), C. difficile colitis (04/11/2022), Carotid stenosis (12/2013), Cellulitis (12/2013), CHF (congestive heart failure) ([facility] [address] Chronic back pain, Diverticular disease, Encounter for imaging to screen for metal prior to MRI (05/26/2022), GERD (gastroesophageal reflux disease), History of atrial fibrillation, Hypercholesteremia, Hypertension, Lower GI bleed, MDRO (multiple drug resistant organisms) resistance (10/26/2019), Neuromuscular disorder ([facility] [address] Renal insufficiency, Septic arthritis of interphalangeal joint of toe of right foot ([facility] [address] (5/27/2022), and Vitamin B12 deficiency. with following problems:    · Complicated left foot infection with osteomyelitis of th eleft  fifth metatarsal bone and left proximal phalanx--wound culture positive for MRSA  · Right lower extremity cellulitis   · Elevated sed rate   · Elevated CRP  · Essential hypertension  · Chronic neurogenic bladder  · Gastroesophageal reflux disease  · Mixed hyperlipidemia  · Obesity Class 1 due to excess calorie intake : Body mass index is 30.53 kg/m². ·       Discussion:      The patient was admitted with worsening left foot infection. MRI of the left foot done on 5/27/2022 showed acute osteomyelitis of the left ankle metatarsal bone and the septic arthritis at the MTP joint of the left fifth toe. He is on empiric IV vancomycin and IV cefepime. Left foot wound culture has grown MRSA so far. MRSA sensitivities reviewed. YANCY for vancomycin is 2      Plan:     Diagnostic Workup:    · Continue to follow-up on left foot wound culture  · We will order CK level as I will be starting patient on daptomycin  · Continue to follow  fever curve, WBC count and blood cultures. · Continue to monitor blood counts, liver and renal function. Antimicrobials:    · MRSA YANCY for vancomycin was 2. We will stop IV vancomycin today  · We will start IV daptomycin today 450 mg every 24 hours  · Continue empiric IV cefepime 2 g every 12 hour for empiric gram-negative coverage  · Contact isolation for MRSA  · We will follow up on the culture results and clinical progress and will make further recommendations accordingly. · Continue close vitals monitoring. · Maintain good glycemic control.   · Fall precautions. Aspiration precautions. · Continue to watch for new fever or diarrhea. · DVT prophylaxis. · Discussed all above with patient and RN. Drug Monitoring:    · Continue monitoring for antibiotic toxicity as follows: CBC, CMP, CK  · Continue to watch for following: new or worsening fever, new hypotension, hives, lip swelling and redness or purulence at vascular access sites. I/v access Management:    · Continue to monitor i.v access sites for erythema, induration, discharge or tenderness. · As always, continue efforts to minimize tubes/lines/drains as clinically appropriate to reduce chances of line associated infections. Patient education and counseling:        · The patient was educated in detail about the side-effects of various antibiotics and things to watch for like new rashes, lip swelling, severe reaction, worsening diarrhea, break through fever etc.  · Discussed patient's condition and what to expect. All of the patient's questions were addressed in a satisfactory manner and patient verbalized understanding all instructions. Level of complexity of visit: High     Risk of Complications/Morbidity: High     · Illness(es)/ Infection present that pose threat to bodily function. · There is potential for severe exacerbation of infection/side effects of treatment. · Therapy requires intensive monitoring for antimicrobial agent toxicity. TIME SPENT TODAY:     - Spent over  36 minutes on visit (including interval history, physical exam, review of data including labs, cultures, imaging, development and implementation of treatment plan and coordination of complex care). More than 50 percent of this includes face-to-face time spent with the patient for counseling and coordination of care. Thank you for involving me in the care of your patient. I will continue to follow. If you have anyadditional questions, please do not hesitate to contact me. Subjective:      Interval history: Interval history was obtained from chart review and patient/ RN. The patient  Is afebrile, feels tired. He is tolerating antibiotics ok     REVIEW OF SYSTEMS:     Review of Systems   Constitutional: Negative for chills, diaphoresis and fever. HENT: Negative for ear discharge, ear pain, rhinorrhea, sore throat and trouble swallowing. Eyes: Negative for discharge and redness. Respiratory: Negative for cough, shortness of breath and wheezing. Cardiovascular: Negative for chest pain and leg swelling. Gastrointestinal: Negative for abdominal pain, constipation, diarrhea and nausea. Endocrine: Negative for polyuria. Genitourinary: Negative for dysuria, flank pain, frequency, hematuria and urgency. Musculoskeletal: Negative for back pain and myalgias. Skin: Negative for rash. Left foot wounds   Neurological: Negative for dizziness, seizures and headaches. Hematological: Does not bruise/bleed easily. Psychiatric/Behavioral: Negative for hallucinations and suicidal ideas. All other systems reviewed and are negative. Past Medical History: All past medical history reviewed today.     Past Medical History:   Diagnosis Date    BPH (benign prostatic hyperplasia)     C. difficile colitis 04/11/2022    Carotid stenosis 12/2013    JESU 39-13% stenosis; LICA 56-97% stenosis    Cellulitis 12/2013    LLE    [facility]congest[facility]) [facility]     Chronic back pain     Diverticular disease     Encounter for imaging to screen for metal prior to MRI 05/26/2022    [employer]: Synchromed II pump for baclofen -lfe    GERD (gastroesophageal reflux disease)     History of atrial fibrillation     Hypercholesteremia     Hypertension     Lower GI bleed     MDRO (multiple drug resistant organisms) resistance 10/26/2019    urine    Neuromuscular disorder ([facility])     spasticity    Renal insufficiency     Septic arthritis of interphalangeal joint of toe of right foot ([facility] [address] 5/27/2022    Vitamin B12 deficiency        Past Surgical History: All past surgical history was reviewed today. Past Surgical History:   Procedure Laterality Date    BACK SURGERY  2006    lower lumbar    CORONARY ARTERY BYPASS GRAFT  12/13/2013    CABG x 5 (Dr Celeste Rowley), svg to diag, om1 and 3, distal rca, kelly to lad.  CYSTOSCOPY N/A 1/10/2019    CYSTOSCOPY performed by Bob Weinstein MD at [facility] [address] Right 5/1/2015    ORIF    LEG SURGERY Right 11/2/2021    RIGHT LOWER EXTREMITY ADVANCEMENT FLAP AND SPLIT THICKNESS SKIN GRAFT PLACEMENT; (WOUND- 10 CM X 5.5 CM; CLOSURE- 6 CM X 5.5 CM; SKIN GRAFT- 7.2 CM X 5 CM) performed by Daniel Bullard MD at [address] [place] [address] Avenue 6/11/2020    [address] [facility] performed by Baron Cruz MD at [facility] 5/4/2020    EGD BIOPSY performed by Baron Cruz MD at [facility] ENDOSCOPY       Family History: All family history was reviewed today. Problem Relation Age of Onset    Heart Disease Father        Objective:       PHYSICAL EXAM:      Vitals:   Vitals:    05/28/22 0351 05/28/22 0415 05/28/22 0830 05/28/22 1300   BP: 133/71  (!) 154/76 (!) 147/72   Pulse: 52 (!) 42 67 65   Resp: 10  16 16   Temp: 97.8 °F (36.6 °C)  97.1 °F (36.2 °C) 97.1 °F (36.2 °C)   TempSrc: Oral  Oral Oral   SpO2: 94%  96% 95%   Weight:       Height:           Physical Exam  Vitals and nursing note reviewed. Constitutional:       Appearance: Normal appearance. He is well-developed. HENT:      Head: Normocephalic and atraumatic. Right Ear: External ear normal.      Left Ear: External ear normal.      Nose: Nose normal. No congestion or rhinorrhea. Mouth/Throat:      Mouth: Mucous membranes are moist.      Pharynx: No oropharyngeal exudate or posterior oropharyngeal erythema. Eyes:      General: No scleral icterus. Right eye: No discharge. Left eye: No discharge. Conjunctiva/sclera: Conjunctivae normal.      Pupils: Pupils are equal, round, and reactive to light. Cardiovascular:      Rate and Rhythm: Normal rate and regular rhythm. Pulses: Normal pulses. Heart sounds: No murmur heard. No friction rub. Pulmonary:      Effort: Pulmonary effort is normal. No respiratory distress. Breath sounds: Normal breath sounds. No stridor. No wheezing, rhonchi or rales. Abdominal:      General: Bowel sounds are normal.      Palpations: Abdomen is soft. Tenderness: There is no abdominal tenderness. There is no right CVA tenderness, left CVA tenderness, guarding or rebound. Musculoskeletal:         General: No swelling or tenderness. Normal range of motion. Cervical back: Normal range of motion and neck supple. No rigidity. No muscular tenderness. Lymphadenopathy:      Cervical: No cervical adenopathy. Skin:     General: Skin is warm and dry. Coloration: Skin is not jaundiced. Findings: No erythema or rash. Comments: Left forefoot wounds noted   Neurological:      General: No focal deficit present. Mental Status: He is alert and oriented to person, place, and time. Mental status is at baseline. Motor: No abnormal muscle tone. Psychiatric:         Mood and Affect: Mood normal.         Behavior: Behavior normal.         Thought Content: Thought content normal.          Lines and drains: All vascular access sites are healthy with no local erythema, discharge or tenderness. Intake and output:    I/O last 3 completed shifts: In: 56 [P.O.:480; I.V.:10]  Out: 3350 [Urine:3350]    Lab Data:   All available labs and old records have been reviewed by me.     CBC:  Recent Labs     05/25/22 1848 05/26/22  0657   WBC 9.3 7.0   RBC 3.86* 3.70*   HGB 11.5* 11.0*   HCT 35.7* 34.1*    187   MCV 92.5 92.4   MCH 29.7 29.6   MCHC 32.1 32.1   RDW 17.0* 17.0*        BMP:  Recent Labs     05/25/22 1848 05/26/22  0657    141   K 4.5 4.4    108   CO2 24 23   BUN 25* 24*   CREATININE 0.9 0.8   CALCIUM 8.3 8.5   GLUCOSE 98 91        Hepatic Function Panel:   Lab Results   Component Value Date    ALKPHOS 45 04/22/2022    ALT 25 04/22/2022    AST 20 04/22/2022    PROT 6.3 04/22/2022    BILITOT <0.2 04/22/2022    BILIDIR <0.2 06/20/2021    IBILI see below 06/20/2021    LABALBU 3.0 04/22/2022       CPK:   Lab Results   Component Value Date    CKTOTAL 225 01/26/2022     ESR:   Lab Results   Component Value Date    SEDRATE 48 (H) 05/25/2022     CRP:   Lab Results   Component Value Date    CRP 16.3 (H) 05/25/2022           Imaging: All pertinent images and reports for the current visit were reviewed by me during this visit. I reviewed the chest x-ray/CT scan/MRI images and independently interpreted the findings and results today. LASER SKIN PERFUSION PRESSURE STUDY   Final Result      MRI FOOT LEFT W WO CONTRAST   Final Result      1. Acute osteomyelitis in the 5th metatarsal bone and proximal phalanx with septic arthritis at the MTP joint. 2.  Regional cellulitis and myositis. 3.  No evidence of abscess. VL DUP LOWER EXTREMITY ARTERIES LEFT   Final Result      XR FOOT RIGHT (MIN 3 VIEWS)   Final Result      Limited radiographs without definite acute osseous abnormality. Consider repeat and provide information regarding specific area of concern. XR TIBIA FIBULA LEFT (2 VIEWS)   Final Result      No acute osseous findings. XR FOOT LEFT (MIN 3 VIEWS)   Final Result      Acute osteomyelitis in the 5th metatarsal bone. XR TIBIA FIBULA RIGHT (2 VIEWS)   Final Result      No acute fracture seen. Medications: All current and past medications were reviewed.      daptomycin (CUBICIN) IVPB  6 mg/kg (Ideal) IntraVENous Q24H    cefepime  2,000 mg IntraVENous Q12H    miconazole   Topical BID    [Held by provider] aspirin  81 mg Oral Daily    atorvastatin  80 mg Oral Nightly    gabapentin  600 mg Oral BID    levothyroxine  75 mcg Oral QAM AC    pantoprazole  40 mg Oral QAM AC    tamsulosin  0.8 mg Oral Daily    traZODone  25 mg Oral Nightly    vitamin B-12  1,000 mcg Oral Daily    sodium chloride flush  5-40 mL IntraVENous 2 times per day    sodium hypochlorite   Irrigation Daily        sodium chloride 10 mL (05/27/22 1125)       sodium chloride flush, sodium chloride, ondansetron **OR** ondansetron, polyethylene glycol, acetaminophen **OR** acetaminophen, traMADol      Problem list:       Patient Active Problem List   Diagnosis Code    Hypotension I95.9    Light headed R42    Cellulitis L03.90    Essential hypertension I10    GERD (gastroesophageal reflux disease) K21.9    Acute blood loss anemia D62    Tinea corporis B35.4    Carotid stenosis I65.29    CAD (coronary artery disease) I25.10    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 Lizzy Sanchez    Complicated UTI (urinary tract infection) N39.0    Edema R60.9    Problem with urinary catheter ([facility] [address] T83. 9XXA    Acute encephalopathy G93.40    Chronic indwelling Iglesias catheter Z97.8    Hyperlipidemia E78.5    Bilateral lower leg cellulitis L03.116, L03.115    Neurogenic bladder N31.9    Bacteriuria R82.71    Abnormality of urethral meatus Q64.70    Gross hematuria R31.0    CHF with unknown LVEF ([facility]) I50.9    Dyspnea R06.00    Bradycardia R00.1    Pseudomonas infection A49.8    Acute renal injury ([address] N17.9    Cellulitis of scrotum N49.2    Constipation K59.00    Encopresis with constipation and overflow incontinence R15.9    Abnormal stress test R94.39    H/O angiography Z92.89    Abnormal angiogram R93.89    Acute cystitis with hematuria N30.01    Acute renal failure superimposed on stage 3 chronic kidney disease (HCC) N17.9, N18.30    Urinary tract infection associated with indwelling urethral catheter ([facility] [address] T83.511A, N39.0    Encephalopathy acute G93.40    Altered mental status R41.82    Hyperkalemia E87.5    Leg laceration, unspecified laterality, initial encounter S81.819A    Pain of right lower extremity M79.604    Degloving injury T14. 8XXA    Weakness R53.1    Symptomatic bradycardia R00.1    Bilateral leg edema R60.0    Symptomatic sinus bradycardia R00.1    Multiple drug resistant organism (MDRO) culture positive Z16.24    Osteomyelitis (HCC) M86.9    Chronic multifocal osteomyelitis of right foot (HCC) M86.371    MRSA infection A49.02    Septic arthritis of interphalangeal joint of toe of right foot (HCC) M00.9    Elevated sed rate R70.0    Elevated C-reactive protein (CRP) R79.82    Infection requiring contact isolation precautions B99.9    Class 1 obesity due to excess calories with body mass index (BMI) of 30.0 to 30.9 in adult E66.09, Z68.30       Please note that this chart was generated using Dragon dictation software. Although every effort was made to ensure the accuracy of this automated transcription, some errors in transcription may have occurred inadvertently. If you may need any clarification, please do not hesitate to contact me through EPIC or at the phone number provided below with my electronic signature. Any pictures or media included in this note were obtained after taking informed verbal consent from the patient and with their approval to include those in the patient's medical record.       Genaro Hays MD, MPH, [address] [place] [address]  5/28/2022, 4:29 PM  [facility] Infectious Disease   [facility] [address] [facility], [address] [place] [address]  Office: [phone]  Fax: [phone]  Clinic days:  Tuesday & Thursday

## 2022-05-28 NOTE — PROGRESS NOTES
Clinical Pharmacy Progress Note    Vancomycin - Management by Pharmacy    Consult Date(s):  5/25/22  Consulting Provider(s):  Dr. Mandy Fuller / Plan:  1) Left 5th metatarsal osteomyelitis, RLE cellulitis, multiple LE ulcers   - Vancomycin   Concurrent Antimicrobials: cefepime   Day of Vanc Therapy: 2   Current Dosing Method: Bayesian-guided AUC  o Therapeutic Goal: AUC = 400-600 mg/L*hr  o Currently on 1500mg IV q24h, dose adjusted on 5/27/22 due to high estimated trough and risk of toxicity  o Regimen provides AUC = 412 with trough = 9.5. No new Cr since 5/26, ordered for tomorrow. o Random level ordered tomorrow AM to assess kinetics of new regimen   Will continue to monitor clinical condition and make adjustments to regimen as appropriate. Please call with questions--  Thanks--  Daniel Velasco, PharmD, BCPS, BCGP  [id number] ([facility])   5/28/2022 9:11 AM      Interval update:  ID recommends cont vanc/cefepime. Laser perfusion study indicates good healing potential. Wound cultures growing MRSA. Planning for L foot partial 5th ray resection tomorrow AM.     Subjective/Objective:   Yordy Garsia is a [de-identified] y.o. y/o male with a PMHx that includes BPH, carotid stenosis, HF, chronic back pain, GERD, A.fib, HLD, HTN, lower GI bleed, recent C.diff infection, and MDRO UTI's who is admitted with Left 5th metatarsal osteomyelitis, RLE cellulitis, multiple LE ulcers, and fungal infection / tinea cruris B/L groin. [facility].     Ht Readings from Last 1 Encounters:   05/25/22 5' 11\" (1.803 m)     Wt Readings from Last 1 Encounters:   05/25/22 218 lb 14.7 oz (99.3 kg)       Current and Prior Antimicrobials:  Cefepime 2000mg EI q12h (5/26-current)  Vancomycin - Pharmacy to dose   1500mg IV x1 5/26 00:30   750mg IV q12h (5/26-5/27)   1500mg IV q24h (5/27-current)    Vanc Level(s) / Doses:  Date Time Dose Level / Type of Level Interpretation   5/27 07:09 750mg q12h Random = 15.9 mcg/mL · Drawn ~ 18h after 3rd dose  · Calculated AUC = 560 with trough = 18.3  · Adjust to 1500mg q24h   5/29 0600 1500mg q24h Random = ordered    Note: Serum levels collected for AUC-based dosing may be high if collected in close proximity to the dose administered. This is not necessarily an indicator of toxicity. Recent Labs     05/25/22  1848 05/26/22  0657   CREATININE 0.9 0.8   BUN 25* 24*   WBC 9.3 7.0       Estimated Creatinine Clearance: 88 mL/min (based on SCr of 0.8 mg/dL).     Cultures & Sensitivities:  Date Site Micro Susceptibility / Result   5/25 Wound Mixed skin anne, heavy growth  MRSA, moderate growth No further w/u  YANCY's pending

## 2022-05-28 NOTE — PLAN OF CARE
Problem: Pain  Goal: Verbalizes/displays adequate comfort level or baseline comfort level  Outcome: Progressing  Note: Patient's pain is controlled with current regime      Problem: Safety - Adult  Goal: Free from fall injury  Outcome: Progressing  Note: Pt remains free of falls thus far this shift. All fall precautions in place and functioning properly.

## 2022-05-28 NOTE — PROGRESS NOTES
Podiatric Surgery Daily Progress Note      Admit Date: 5/25/2022      Code:Full Code    Patient seen and examined, labs and records reviewed    Subjective:     Patient seen at bedside this AM.  Patient denies any overnight acute event. Patient denies f/c/n/v/sob/cp. Review of Systems: A 12 point review of symptoms is unremarkable with the exception of the chief complaint. Patient specifically denies nausea, fever, vomiting, chills, shortness of breath, chest pain, abdominal pain, constipation or difficulty urinating. Objective     /71   Pulse (!) 42 Comment: MD notified  Temp 97.8 °F (36.6 °C) (Oral)   Resp 10   Ht 5' 11\" (1.803 m)   Wt 218 lb 14.7 oz (99.3 kg)   SpO2 94%   BMI 30.53 kg/m²      I/O:    Intake/Output Summary (Last 24 hours) at 5/28/2022 0643  Last data filed at 5/28/2022 0557  Gross per 24 hour   Intake 250 ml   Output 2500 ml   Net -2250 ml              Wt Readings from Last 3 Encounters:   05/25/22 218 lb 14.7 oz (99.3 kg)   04/22/22 232 lb 12.8 oz (105.6 kg)   04/11/22 220 lb (99.8 kg)       LABS:    Recent Labs     05/25/22  1848 05/26/22  0657   WBC 9.3 7.0   HGB 11.5* 11.0*   HCT 35.7* 34.1*    187        Recent Labs     05/26/22  0657      K 4.4      CO2 23   BUN 24*   CREATININE 0.8      No results for input(s): PROT, INR, APTT in the last 72 hours. LOWER EXTREMITY EXAMINATION    Dressing to bilateral LE intact. No strikethrough noted to the external dressing. Mild serosanguineous drainage noted to the internal layers of the dressing. VASCULAR: DP and PT pulses nonpalpable 0/4 b/l. Upon handheld doppler examination, DP and PT pulses were noted to have biphasic signals b/l. CFT is brisk to the digits of the foot b/l. Skin temperature is warm to wark from proximal to distal with focal calor noted right lower extremity. +2 pitting edema noted bilateral lower extremities, right greater than left.  No pain with calf compression b/l.     NEUROLOGIC: Gross and epicritic sensation is intact b/l. Protective sensation is diminished at all pedal sites b/l.     DERMATOLOGIC: Diffuse dermatologic changes noted to b/l LE with erythema present from just proximal to the ankle joint to just distal to the knee joint which does not resolve with extremity elevation.      Right lower extremity:  Multiple full-thickness ulcerations noted to the anterior aspect and anterior lateral aspect of the right leg. Wound base is a combination of granular, and fibrotic tissue. Periwound erythema noted. No purulent drainage present. No malodor noted. No fluctuance or crepitus noted. Superficial ulceration noted to the dorsal medial aspect of the right fifth digit. No acute signs of infection noted. Full-thickness ulceration noted at the fifth metatarsal head. Wound base is a combination of granular and necrotic tissue. Wound measures approximately 2 cm x 2 cm x 0.1 cm. No purulent drainage expressed. No tracking, tunneling, crepitus, fluctuance, or malodor noted. Full-thickness ulceration noted just proximal to the above mentioned wound on the fifth metatarsal.  Wound base is granular in nature. The wound measures approximately 2 cm x 1.5 cm x 0.1 cm. No purulent drainage expressed. No tracking, tunneling, crepitus, fluctuance, or malodor noted. Deep tissue injury noted to the posterior lateral aspect of the right lower extremity. No fluctuance or crepitus noted. No purulent drainage expressed. No malodor noted.     Left lower extremity: Full-thickness ulceration noted to the anterior aspect of the shin to the left lower extremity. The wound base is granular in nature. No erythema noted periwound. The wound does not probe to bone. No purulent drainage, tracking, tunneling, crepitus, fluctuance, or acute signs of infection noted. Full-thickness ulceration noted to the distal lateral aspect of the left foot.   Measures approximately 3 cm x 2.4 cm x 0.3 cm the wound base is a mixture of granular, fibrotic, and ischemic tissue. Erythema noted periwound. The wound does probe to bone. No purulent drainage expressed. Wound does track about 2 cm medially and proximally. No crepitus or fluctuance noted. Slight malodor noted. Deep tissue injuries noted to the posterior aspect of the heel to the left lower extremity. No fluctuance or crepitus noted. No purulent drainage noted. No malodor noted.     MUSCULOSKELETAL: Muscle strength is 4/5 for all pedal groups tested. Mild pain with palpation of the periwound areas to bilateral lower extremities. Ankle joint ROM is decreased in dorsiflexion with the knee extended. No obvious biomechanical abnormalities. IMAGING:  XR Tib/Fib Right (5/25/2022)  Narrative   RIGHT TIBIA FIBULA  4 views       HISTORY: Pain       COMPARISON: none       FINDINGS:       No evidence of acute fracture or traumatic bony abnormality is seen.       Osteopenia is noted.           Impression       No acute fracture seen.      XR Left Foot (5/25/2022)  Narrative   EXAM: LEFT FOOT 3 VIEWS       INDICATION: Left 5th toe wound.       COMPARISON: 1/14/20       FINDINGS:       Wound or ulceration identified over the 5th MTP region. There is loss of cortical margin laterally suggesting erosive changes in the neck and head of the metatarsal bone. Bones are otherwise severely osteopenic. No fracture identified. Extensive vascular    calcifications.           Impression       Acute osteomyelitis in the 5th metatarsal bone.          XR foot right (5/25/2022)  Narrative   EXAM: RIGHT FOOT 3 VIEWS       INDICATION: Right foot wound       COMPARISON: None       FINDINGS:       Suboptimal positioning. Artifact from patient motion on the oblique view. Findings significantly limited exam in conjunction with the severe osteopenia. No definite erosion or fracture seen. There is diffuse soft tissue swelling.  No definite soft tissue    gas.           Impression       Limited performed without intravenous contrast.        Findings:       Wound or ulceration laterally over the forefoot 5th MTP joint region. 5th metatarsal head and neck erosions, confluent marrow placement and postcontrast enhancement extending as far proximally as the mid shaft consistent with acute osteomyelitis. Enhancement/synovitis in the MTP joint and diffuse marrow replacement in the proximal phalanx, consistent with septic arthritis and osteomyelitis, respectively.       Cellulitis and myositis in the soft tissues around the infected bone. No evidence of abscess. There is more diffuse nonspecific edema signal extending over the dorsum of the foot in the subcutaneous space. No significant tenosynovitis. Extensive diffuse    atrophy throughout the intrinsic foot muscles consistent with chronic denervation.           Impression       1.  Acute osteomyelitis in the 5th metatarsal bone and proximal phalanx with septic arthritis at the MTP joint. 2.  Regional cellulitis and myositis. 3.  No evidence of abscess. Laser Skin Perfusion 5/27/2022  Tech Comments   Right   Pulse volume recording at the level of the foot reveals waveforms within   normal limits. Laser skin perfusion pressure study at the transmetatarsal level ([place]) is   148 mmHg; wound healing likely. This is considered to have a good probability   for healing. Laser skin perfusion pressure study at the transmetatarsal level (Medial   plantar) is 112 mmHg; wound healing likely. This is considered to have a good   probability for healing. Left   Pulse volume recording at the level of the foot reveals waveforms within   normal limits. Laser skin perfusion pressure study at the transmetatarsal level ([place]) is   100 mmHg; wound healing likely. This is considered to have a good probability   for healing. Laser skin perfusion pressure study at the transmetatarsal level (Medial   Plantar) is 82 mmHg; wound healing likely.  This is considered to have a good   probability for healing. There are no previous studies for comparison. ASSESSMENT/PLAN  -Osteomyelitis, left fifth metatarsal  -Cellulitis, right lower extremity  -Serous bulla, left dorsal fourth interspace  -Full-thickness ulceration, distal lateral left foot-Bueno stage III  -Full-thickness ulceration, anterior left leg-Bueno stage II  -Full-thickness ulceration, right leg-Bueno stage II  -Full-thickness ulcerations, distal lateral right foot-Bueno stage II  -Superficial thickness ulceration, right fifth digit-Bueno stage I  -Deep tissue injury, bilateral heels    -Patient seen and examined at bedside this AM  -Hypertensive but VSS, No updated CBC this a.m.  -ESR 48 and CRP 16.3  -lactic acid 0.8  -prealbumin 12.4; dietitian is on board  -XR and MRI imaging reviewed, impression noted above  -Arterial duplex reviewed, impression noted above  -Since arterial duplex was nondiagnostic will order laser perfusion study to assess wound healing potential to left lower extremity  -Laser perfusion reviewed; impression noted above. Pressures indicate good healing potential   -Wound culture: MRSA  -Continue IV antibiotics per primary team  -ID consulted, appreciate recommendations  -Right lower extremity dressed with Adaptic, Betadine soaked gauze, DSD, and Ace  -Left lower extremity dressed with Adaptic, Dakin's soaked gauze, DSD, and Ace  -Prevalon boots re-applied. Patient is to wear at all times while in bed to prevent further deep tissue injury.  -Patient is nonweightbearing at baseline    DISPO: Multiple ulcerations to bilateral lower extremity. Osteomyelitis of the left fifth metatarsal.  Cellulitis right lower extremity. Labs and imaging reviewed. ID consulted; appreciate recommendations. Plan for a left foot partial 5th ray resection tomorrow morning. Patient to be NPO at [address] [place] [address] hold anticoagulants on 5/29.      Patient assessment and plan was discussed with Dr. Ivan Kaye, Suraj Eastman, WILBERTM  Podiatric Resident, PGY-1  Pager #: [phone] or Perfect Serve

## 2022-05-28 NOTE — PROGRESS NOTES
Iglesias care provided with soap and water. Iglesias draining 1000 mls hazy yellow urine, not malodorous.

## 2022-05-28 NOTE — PROGRESS NOTES
Hospitalist Progress Note      PCP: INES BLANDON [address]    Date of Admission: 5/25/2022    Chief Complaint:     Chief Complaint   Patient presents with    Other     Sent to ED from [address] for redness and swelling to groin that pt reports he has had for a long time. Nursing home never called with report     Subjective:  Patient denies CP, SOB, HA and abdominal pain. No fevers. OR for L foot tomorrow.       PFHS: reviewed as documented 5/25/2022, no changes unless noted above    Medications:  Reviewed    Infusion Medications    sodium chloride 10 mL (05/27/22 1125)     Scheduled Medications    vancomycin  1,500 mg IntraVENous Q24H    cefepime  2,000 mg IntraVENous Q12H    miconazole   Topical BID    [Held by provider] aspirin  81 mg Oral Daily    atorvastatin  80 mg Oral Nightly    gabapentin  600 mg Oral BID    levothyroxine  75 mcg Oral QAM AC    pantoprazole  40 mg Oral QAM AC    tamsulosin  0.8 mg Oral Daily    traZODone  25 mg Oral Nightly    vitamin B-12  1,000 mcg Oral Daily    sodium chloride flush  5-40 mL IntraVENous 2 times per day    sodium hypochlorite   Irrigation Daily     PRN Meds: sodium chloride flush, sodium chloride, ondansetron **OR** ondansetron, polyethylene glycol, acetaminophen **OR** acetaminophen, traMADol      Intake/Output Summary (Last 24 hours) at 5/28/2022 1325  Last data filed at 5/28/2022 0853  Gross per 24 hour   Intake 255 ml   Output 2500 ml   Net -2245 ml       Physical Exam    BP (!) 147/72   Pulse 65   Temp 97.1 °F (36.2 °C) (Oral)   Resp 16   Ht 5' 11\" (1.803 m)   Wt 218 lb 14.7 oz (99.3 kg)   SpO2 95%   BMI 30.53 kg/m²     General appearance:  No acute distress, appears stated age  Eyes: Pupils equal, round, reactive to light, conjunctiva/corneas clear  Ears/Nose/Mouth/Throat: No external lesions or scars, hearing intact to voice  Neck: Trachea midline, no masses noted, no thyromegaly  Respiratory:  Non-labored breathing, clear to auscultation bilaterally  Cardiovascular: Regular rate and rhythm, no murmurs, gallops, or rubs  Abdomen: soft, non-tender, non-distended  Musculoskeletal: Warm, well perfused, no cyanosis or edema  Skin: Left foot bandaged, photos and ID and podiatry note  Psychiatric: A&Ox4, good insight and judgment    Labs:   Recent Labs     05/25/22 1848 05/26/22  0657   WBC 9.3 7.0   HGB 11.5* 11.0*   HCT 35.7* 34.1*    187     Recent Labs     05/25/22  1848 05/26/22  0657    141   K 4.5 4.4    108   CO2 24 23   BUN 25* 24*   CREATININE 0.9 0.8   CALCIUM 8.3 8.5     No results for input(s): AST, ALT, BILIDIR, BILITOT, ALKPHOS in the last 72 hours. No results for input(s): INR in the last 72 hours. No results for input(s): Reyne Argentine in the last 72 hours. Urinalysis:      Lab Results   Component Value Date    NITRU POSITIVE 04/22/2022    WBCUA 21-50 04/22/2022    BACTERIA 4+ 04/22/2022    RBCUA 3-4 04/22/2022    BLOODU Negative 04/22/2022    SPECGRAV <=1.005 04/22/2022    GLUCOSEU Negative 04/22/2022       Radiology:  LASER SKIN PERFUSION PRESSURE STUDY         MRI FOOT LEFT W WO CONTRAST   Final Result      1. Acute osteomyelitis in the 5th metatarsal bone and proximal phalanx with septic arthritis at the MTP joint. 2.  Regional cellulitis and myositis. 3.  No evidence of abscess. VL DUP LOWER EXTREMITY ARTERIES LEFT   Final Result      XR FOOT RIGHT (MIN 3 VIEWS)   Final Result      Limited radiographs without definite acute osseous abnormality. Consider repeat and provide information regarding specific area of concern. XR TIBIA FIBULA LEFT (2 VIEWS)   Final Result      No acute osseous findings. XR FOOT LEFT (MIN 3 VIEWS)   Final Result      Acute osteomyelitis in the 5th metatarsal bone. XR TIBIA FIBULA RIGHT (2 VIEWS)   Final Result      No acute fracture seen.              Assessment/Plan:    Active Hospital Problems    Diagnosis Date Noted    Chronic multifocal osteomyelitis of right foot [facility] Tristen Euceda 05/27/2022     Priority: Medium    MRSA infection [A49.02] 05/27/2022     Priority: Medium    Septic arthritis of interphalangeal joint of toe of right foot ([address] [M00.9] 05/27/2022     Priority: Medium    Osteomyelitis ([address] [M86.9] 05/25/2022     Priority: Medium       Plan:    #Acute osteomyelitis of the left fifth toe  #Chronic skin wounds  #Fungal infection/tinea curious  #Chronic indwelling Iglesias catheter  #Recent UTI with MDRO  #Recent C. difficile infection  #Chronic diastolic heart failure  #Bedridden  #History of paroxysmal A. fib  #Hypertension  #Hyperlipidemia  #GERD  #CAD  Appreciate ID and podiatry consultation  OR tomorrow  Analgesia as needed  Continue vancomycin and cefepime  Follow cultures    # Remainder of medical conditions  -home management except as above    DVT Prophylaxis: Lovenox  Diet: ADULT DIET; Regular; 4 carb choices (60 gm/meal); Low Fat/Low Chol/High Fiber/2 gm Na  ADULT ORAL NUTRITION SUPPLEMENT; Breakfast, Dinner; Wound Healing Oral Supplement  ADULT ORAL NUTRITION SUPPLEMENT; Breakfast, Lunch; Wound Healing Oral Supplement  Diet NPO  Code Status: Full Code    PT/OT Eval Status: Ongoing    Dispo: Unclear    Discussed with patient and nursing. OR for L foot OM tomorrow.     Jose Akbar MD

## 2022-05-29 ENCOUNTER — APPOINTMENT (OUTPATIENT)
Dept: GENERAL RADIOLOGY | Age: 82
DRG: 463 | End: 2022-05-29
Payer: MEDICARE

## 2022-05-29 ENCOUNTER — ANESTHESIA (OUTPATIENT)
Dept: OPERATING ROOM | Age: 82
DRG: 463 | End: 2022-05-29
Payer: MEDICARE

## 2022-05-29 LAB
ABO/RH: NORMAL
ANION GAP SERPL CALCULATED.3IONS-SCNC: 7 MMOL/L (ref 3–16)
ANTIBODY SCREEN: NORMAL
BASOPHILS ABSOLUTE: 0.1 K/UL (ref 0–0.2)
BASOPHILS RELATIVE PERCENT: 1 %
BUN BLDV-MCNC: 34 MG/DL (ref 7–20)
CALCIUM SERPL-MCNC: 8.5 MG/DL (ref 8.3–10.6)
CHLORIDE BLD-SCNC: 106 MMOL/L (ref 99–110)
CO2: 24 MMOL/L (ref 21–32)
CREAT SERPL-MCNC: 0.7 MG/DL (ref 0.8–1.3)
EOSINOPHILS ABSOLUTE: 0.8 K/UL (ref 0–0.6)
EOSINOPHILS RELATIVE PERCENT: 12.1 %
GFR AFRICAN AMERICAN: >60
GFR NON-AFRICAN AMERICAN: >60
GLUCOSE BLD-MCNC: 88 MG/DL (ref 70–99)
HCT VFR BLD CALC: 31.8 % (ref 40.5–52.5)
HEMOGLOBIN: 10.4 G/DL (ref 13.5–17.5)
INR BLD: 1.22 (ref 0.87–1.14)
LYMPHOCYTES ABSOLUTE: 1.5 K/UL (ref 1–5.1)
LYMPHOCYTES RELATIVE PERCENT: 21.5 %
MCH RBC QN AUTO: 29.8 PG (ref 26–34)
MCHC RBC AUTO-ENTMCNC: 32.7 G/DL (ref 31–36)
MCV RBC AUTO: 91.4 FL (ref 80–100)
MONOCYTES ABSOLUTE: 0.5 K/UL (ref 0–1.3)
MONOCYTES RELATIVE PERCENT: 6.8 %
NEUTROPHILS ABSOLUTE: 4.1 K/UL (ref 1.7–7.7)
NEUTROPHILS RELATIVE PERCENT: 58.6 %
PDW BLD-RTO: 16.5 % (ref 12.4–15.4)
PLATELET # BLD: 180 K/UL (ref 135–450)
PMV BLD AUTO: 9.1 FL (ref 5–10.5)
POTASSIUM REFLEX MAGNESIUM: 4.7 MMOL/L (ref 3.5–5.1)
PROTHROMBIN TIME: 15.4 SEC (ref 11.7–14.5)
RBC # BLD: 3.48 M/UL (ref 4.2–5.9)
SODIUM BLD-SCNC: 137 MMOL/L (ref 136–145)
WBC # BLD: 7 K/UL (ref 4–11)

## 2022-05-29 PROCEDURE — 2580000003 HC RX 258: Performed by: INTERNAL MEDICINE

## 2022-05-29 PROCEDURE — 80048 BASIC METABOLIC PNL TOTAL CA: CPT

## 2022-05-29 PROCEDURE — 7100000000 HC PACU RECOVERY - FIRST 15 MIN: Performed by: PODIATRIST

## 2022-05-29 PROCEDURE — 88305 TISSUE EXAM BY PATHOLOGIST: CPT

## 2022-05-29 PROCEDURE — 99233 SBSQ HOSP IP/OBS HIGH 50: CPT | Performed by: INTERNAL MEDICINE

## 2022-05-29 PROCEDURE — 6360000002 HC RX W HCPCS: Performed by: STUDENT IN AN ORGANIZED HEALTH CARE EDUCATION/TRAINING PROGRAM

## 2022-05-29 PROCEDURE — 1200000000 HC SEMI PRIVATE

## 2022-05-29 PROCEDURE — 2580000003 HC RX 258: Performed by: PODIATRIST

## 2022-05-29 PROCEDURE — 2709999900 HC NON-CHARGEABLE SUPPLY: Performed by: PODIATRIST

## 2022-05-29 PROCEDURE — 0Y6N0ZF DETACHMENT AT LEFT FOOT, PARTIAL 5TH RAY, OPEN APPROACH: ICD-10-PCS | Performed by: PODIATRIST

## 2022-05-29 PROCEDURE — 3700000001 HC ADD 15 MINUTES (ANESTHESIA): Performed by: PODIATRIST

## 2022-05-29 PROCEDURE — 3600000004 HC SURGERY LEVEL 4 BASE: Performed by: PODIATRIST

## 2022-05-29 PROCEDURE — 6370000000 HC RX 637 (ALT 250 FOR IP): Performed by: STUDENT IN AN ORGANIZED HEALTH CARE EDUCATION/TRAINING PROGRAM

## 2022-05-29 PROCEDURE — A4217 STERILE WATER/SALINE, 500 ML: HCPCS | Performed by: PODIATRIST

## 2022-05-29 PROCEDURE — 2500000003 HC RX 250 WO HCPCS: Performed by: PODIATRIST

## 2022-05-29 PROCEDURE — 6360000002 HC RX W HCPCS: Performed by: INTERNAL MEDICINE

## 2022-05-29 PROCEDURE — 3700000000 HC ANESTHESIA ATTENDED CARE: Performed by: PODIATRIST

## 2022-05-29 PROCEDURE — 6370000000 HC RX 637 (ALT 250 FOR IP): Performed by: INTERNAL MEDICINE

## 2022-05-29 PROCEDURE — 2580000003 HC RX 258: Performed by: STUDENT IN AN ORGANIZED HEALTH CARE EDUCATION/TRAINING PROGRAM

## 2022-05-29 PROCEDURE — 85610 PROTHROMBIN TIME: CPT

## 2022-05-29 PROCEDURE — 0JBR0ZZ EXCISION OF LEFT FOOT SUBCUTANEOUS TISSUE AND FASCIA, OPEN APPROACH: ICD-10-PCS | Performed by: PODIATRIST

## 2022-05-29 PROCEDURE — 36415 COLL VENOUS BLD VENIPUNCTURE: CPT

## 2022-05-29 PROCEDURE — 7100000001 HC PACU RECOVERY - ADDTL 15 MIN: Performed by: PODIATRIST

## 2022-05-29 PROCEDURE — 85025 COMPLETE CBC W/AUTO DIFF WBC: CPT

## 2022-05-29 PROCEDURE — 6360000002 HC RX W HCPCS: Performed by: ANESTHESIOLOGY

## 2022-05-29 PROCEDURE — 88311 DECALCIFY TISSUE: CPT

## 2022-05-29 PROCEDURE — XHRPXF7 REPLACEMENT OF SKIN WITH BIOENGINEERED ALLOGENEIC CONSTRUCT, EXTERNAL APPROACH, NEW TECHNOLOGY GROUP 7: ICD-10-PCS | Performed by: PODIATRIST

## 2022-05-29 PROCEDURE — 73630 X-RAY EXAM OF FOOT: CPT

## 2022-05-29 PROCEDURE — 3600000014 HC SURGERY LEVEL 4 ADDTL 15MIN: Performed by: PODIATRIST

## 2022-05-29 DEVICE — GRAFT HUM TISS W2XL4CM CRYOPRESERVED PLCNTA TISS UMB AMNION: Type: IMPLANTABLE DEVICE | Site: FOOT | Status: FUNCTIONAL

## 2022-05-29 RX ORDER — FENTANYL CITRATE 50 UG/ML
INJECTION, SOLUTION INTRAMUSCULAR; INTRAVENOUS PRN
Status: DISCONTINUED | OUTPATIENT
Start: 2022-05-29 | End: 2022-05-29 | Stop reason: SDUPTHER

## 2022-05-29 RX ORDER — ONDANSETRON 2 MG/ML
4 INJECTION INTRAMUSCULAR; INTRAVENOUS
Status: DISCONTINUED | OUTPATIENT
Start: 2022-05-29 | End: 2022-05-29 | Stop reason: HOSPADM

## 2022-05-29 RX ORDER — MAGNESIUM HYDROXIDE 1200 MG/15ML
LIQUID ORAL CONTINUOUS PRN
Status: DISCONTINUED | OUTPATIENT
Start: 2022-05-29 | End: 2022-05-29 | Stop reason: HOSPADM

## 2022-05-29 RX ORDER — BUPIVACAINE HYDROCHLORIDE 5 MG/ML
INJECTION, SOLUTION EPIDURAL; INTRACAUDAL PRN
Status: DISCONTINUED | OUTPATIENT
Start: 2022-05-29 | End: 2022-05-29 | Stop reason: HOSPADM

## 2022-05-29 RX ORDER — OXYCODONE HYDROCHLORIDE 5 MG/1
5 TABLET ORAL
Status: DISCONTINUED | OUTPATIENT
Start: 2022-05-29 | End: 2022-05-29 | Stop reason: HOSPADM

## 2022-05-29 RX ORDER — PROPOFOL 10 MG/ML
INJECTION, EMULSION INTRAVENOUS PRN
Status: DISCONTINUED | OUTPATIENT
Start: 2022-05-29 | End: 2022-05-29 | Stop reason: SDUPTHER

## 2022-05-29 RX ORDER — LIDOCAINE HYDROCHLORIDE 20 MG/ML
INJECTION, SOLUTION INTRAVENOUS PRN
Status: DISCONTINUED | OUTPATIENT
Start: 2022-05-29 | End: 2022-05-29 | Stop reason: SDUPTHER

## 2022-05-29 RX ORDER — PROCHLORPERAZINE EDISYLATE 5 MG/ML
5 INJECTION INTRAMUSCULAR; INTRAVENOUS
Status: DISCONTINUED | OUTPATIENT
Start: 2022-05-29 | End: 2022-05-29 | Stop reason: HOSPADM

## 2022-05-29 RX ORDER — LABETALOL HYDROCHLORIDE 5 MG/ML
10 INJECTION, SOLUTION INTRAVENOUS
Status: DISCONTINUED | OUTPATIENT
Start: 2022-05-29 | End: 2022-05-29 | Stop reason: HOSPADM

## 2022-05-29 RX ORDER — SODIUM CHLORIDE 0.9 % (FLUSH) 0.9 %
5-40 SYRINGE (ML) INJECTION EVERY 12 HOURS SCHEDULED
Status: DISCONTINUED | OUTPATIENT
Start: 2022-05-29 | End: 2022-05-29 | Stop reason: HOSPADM

## 2022-05-29 RX ORDER — SODIUM CHLORIDE 9 MG/ML
INJECTION, SOLUTION INTRAVENOUS PRN
Status: DISCONTINUED | OUTPATIENT
Start: 2022-05-29 | End: 2022-05-29 | Stop reason: HOSPADM

## 2022-05-29 RX ORDER — MEPERIDINE HYDROCHLORIDE 25 MG/ML
12.5 INJECTION INTRAMUSCULAR; INTRAVENOUS; SUBCUTANEOUS EVERY 5 MIN PRN
Status: DISCONTINUED | OUTPATIENT
Start: 2022-05-29 | End: 2022-05-29 | Stop reason: HOSPADM

## 2022-05-29 RX ORDER — ONDANSETRON 2 MG/ML
INJECTION INTRAMUSCULAR; INTRAVENOUS PRN
Status: DISCONTINUED | OUTPATIENT
Start: 2022-05-29 | End: 2022-05-29 | Stop reason: SDUPTHER

## 2022-05-29 RX ORDER — LIDOCAINE HYDROCHLORIDE 10 MG/ML
INJECTION, SOLUTION EPIDURAL; INFILTRATION; INTRACAUDAL; PERINEURAL PRN
Status: DISCONTINUED | OUTPATIENT
Start: 2022-05-29 | End: 2022-05-29 | Stop reason: HOSPADM

## 2022-05-29 RX ORDER — HYDRALAZINE HYDROCHLORIDE 20 MG/ML
10 INJECTION INTRAMUSCULAR; INTRAVENOUS
Status: DISCONTINUED | OUTPATIENT
Start: 2022-05-29 | End: 2022-05-29 | Stop reason: HOSPADM

## 2022-05-29 RX ORDER — SODIUM CHLORIDE 0.9 % (FLUSH) 0.9 %
5-40 SYRINGE (ML) INJECTION PRN
Status: DISCONTINUED | OUTPATIENT
Start: 2022-05-29 | End: 2022-05-29 | Stop reason: HOSPADM

## 2022-05-29 RX ADMIN — LIDOCAINE HYDROCHLORIDE 100 MG: 20 INJECTION, SOLUTION INTRAVENOUS at 07:40

## 2022-05-29 RX ADMIN — ONDANSETRON 4 MG: 2 INJECTION INTRAMUSCULAR; INTRAVENOUS at 08:33

## 2022-05-29 RX ADMIN — TRAZODONE HYDROCHLORIDE 25 MG: 50 TABLET, FILM COATED ORAL at 23:28

## 2022-05-29 RX ADMIN — TRAZODONE HYDROCHLORIDE 25 MG: 50 TABLET, FILM COATED ORAL at 20:57

## 2022-05-29 RX ADMIN — PANTOPRAZOLE SODIUM 40 MG: 40 TABLET, DELAYED RELEASE ORAL at 05:29

## 2022-05-29 RX ADMIN — GABAPENTIN 600 MG: 600 TABLET, FILM COATED ORAL at 14:16

## 2022-05-29 RX ADMIN — MICONAZOLE NITRATE: 2 POWDER TOPICAL at 20:00

## 2022-05-29 RX ADMIN — CEFEPIME HYDROCHLORIDE 2000 MG: 2 INJECTION, POWDER, FOR SOLUTION INTRAVENOUS at 01:25

## 2022-05-29 RX ADMIN — ATORVASTATIN CALCIUM 80 MG: 80 TABLET, FILM COATED ORAL at 19:58

## 2022-05-29 RX ADMIN — CYANOCOBALAMIN TAB 1000 MCG 1000 MCG: 1000 TAB at 14:16

## 2022-05-29 RX ADMIN — SODIUM CHLORIDE, PRESERVATIVE FREE 10 ML: 5 INJECTION INTRAVENOUS at 19:59

## 2022-05-29 RX ADMIN — DAPTOMYCIN 450 MG: 500 INJECTION, POWDER, LYOPHILIZED, FOR SOLUTION INTRAVENOUS at 20:00

## 2022-05-29 RX ADMIN — LEVOTHYROXINE SODIUM 75 MCG: 0.07 TABLET ORAL at 05:29

## 2022-05-29 RX ADMIN — TAMSULOSIN HYDROCHLORIDE 0.8 MG: 0.4 CAPSULE ORAL at 14:16

## 2022-05-29 RX ADMIN — PROPOFOL 100 MG: 10 INJECTION, EMULSION INTRAVENOUS at 07:40

## 2022-05-29 RX ADMIN — SODIUM CHLORIDE: 9 INJECTION, SOLUTION INTRAVENOUS at 07:30

## 2022-05-29 RX ADMIN — ACETAMINOPHEN 650 MG: 325 TABLET ORAL at 01:26

## 2022-05-29 RX ADMIN — GABAPENTIN 600 MG: 600 TABLET, FILM COATED ORAL at 19:59

## 2022-05-29 RX ADMIN — TRAMADOL HYDROCHLORIDE 25 MG: 50 TABLET, COATED ORAL at 23:28

## 2022-05-29 RX ADMIN — FENTANYL CITRATE 100 MCG: 50 INJECTION, SOLUTION INTRAMUSCULAR; INTRAVENOUS at 07:40

## 2022-05-29 ASSESSMENT — ENCOUNTER SYMPTOMS
EYE DISCHARGE: 0
ABDOMINAL PAIN: 0
DIARRHEA: 0
COUGH: 0
SORE THROAT: 0
BACK PAIN: 0
RHINORRHEA: 0
SHORTNESS OF BREATH: 0
SHORTNESS OF BREATH: 1
EYE REDNESS: 0
TROUBLE SWALLOWING: 0
CONSTIPATION: 0
WHEEZING: 0
NAUSEA: 0

## 2022-05-29 ASSESSMENT — PAIN SCALES - GENERAL
PAINLEVEL_OUTOF10: 7
PAINLEVEL_OUTOF10: 4
PAINLEVEL_OUTOF10: 0
PAINLEVEL_OUTOF10: 0

## 2022-05-29 NOTE — PROGRESS NOTES
From OR to [facility] 13 accompanied by Dr. Court Arguello and monitors applied. intraop meds discussed and pt awakes to verbal stimulation.

## 2022-05-29 NOTE — ANESTHESIA POSTPROCEDURE EVALUATION
Department of Anesthesiology  Postprocedure Note    Patient: Kelly Mallory  MRN: [id number]  YOB: 1940  Date of evaluation: 5/29/2022  Time:  8:59 AM     Procedure Summary     Date: 05/29/22 Room / Location: [facility] [address]    Anesthesia Start: 0730 Anesthesia Stop: 6010    Procedure: LEFT FOOT PARTIAL FIFTH RAY RESECTION WITH EXCISIONAL DEBRIDEMENT OF MUSCLE AND FASCIA, WITH APPLICATION OF GRAFT (Left Foot) Diagnosis:       Osteomyelitis of ankle or foot, acute, left ([address]      (LEFT FOOT 5TH TOE OSTEOMYELITIS)    Surgeons: Darshana Smallwood DPM Responsible Provider: Sayda Figueroa MD    Anesthesia Type: general ASA Status: 3          Anesthesia Type: No value filed. Twila Phase I: Twila Score: 9    Twila Phase II:      Last vitals: Reviewed and per EMR flowsheets.        Anesthesia Post Evaluation    Patient location during evaluation: PACU  Patient participation: complete - patient participated  Level of consciousness: awake and alert  Airway patency: patent  Nausea & Vomiting: no nausea and no vomiting  Complications: no  Cardiovascular status: hemodynamically stable  Respiratory status: acceptable  Hydration status: euvolemic  Multimodal analgesia pain management approach

## 2022-05-29 NOTE — PLAN OF CARE
Problem: Discharge Planning  Goal: Discharge to home or other facility with appropriate resources  Outcome: Progressing  Flowsheets (Taken 5/28/2022 0830 by Maria De Jesus Reardon RN)  Discharge to home or other facility with appropriate resources: Identify barriers to discharge with patient and caregiver     Problem: Pain  Goal: Verbalizes/displays adequate comfort level or baseline comfort level  5/28/2022 2229 by Rochelle Pinto RN  Outcome: Progressing  5/28/2022 1619 by Maria De Jesus Reardon RN  Outcome: Progressing  Note: Patient's pain is controlled with current regime      Problem: Discharge Planning  Goal: Discharge to home or other facility with appropriate resources  Outcome: Progressing  Flowsheets (Taken 5/28/2022 0830 by Maria De Jesus Reardon RN)  Discharge to home or other facility with appropriate resources: Identify barriers to discharge with patient and caregiver     Problem: Chronic Conditions and Co-morbidities  Goal: Patient's chronic conditions and co-morbidity symptoms are monitored and maintained or improved  Outcome: Progressing  Flowsheets (Taken 5/28/2022 0830 by Maria De Jesus Reardon RN)  Care Plan - Patient's Chronic Conditions and Co-Morbidity Symptoms are Monitored and Maintained or Improved: Monitor and assess patient's chronic conditions and comorbid symptoms for stability, deterioration, or improvement     Problem: Safety - Adult  Goal: Free from fall injury  5/28/2022 2229 by Rochelle Pinto RN  Outcome: Progressing  5/28/2022 1619 by Maria De Jesus Reardon RN  Outcome: Progressing  Note: Pt remains free of falls thus far this shift. All fall precautions in place and functioning properly.       Problem: ABCDS Injury Assessment  Goal: Absence of physical injury  Outcome: Progressing     Problem: Nutrition Deficit:  Goal: Optimize nutritional status  Outcome: Progressing

## 2022-05-29 NOTE — PROGRESS NOTES
Infectious Diseases   Progress Note      Admission Date: 5/25/2022  Hospital Day: Hospital Day: 5   Attending: Mitchell Trevizo MD  Date of service: 5/29/2022     Chief complaint/ Reason for consult:     · Complicated left foot infection with osteomyelitis of th eleft  fifth metatarsal bone and left proximal phalanx--wound culture positive for MRSA  · Right lower extremity cellulitis   · Elevated sed rate   · Elevated CRP  · Essential hypertension    Microbiology:        I have reviewed allavailable micro lab data and cultures    · Left foot wound culture  - collected on 5/25/2022: MRSA    Susceptibility      Staph aureus mrsa (1)    Antibiotic Interpretation Microscan  Method Status    ceFAZolin Resistant 8 mcg/mL BACTERIAL SUSCEPTIBILITY PANEL BY YANCY     clindamycin Sensitive <=0.5 mcg/mL BACTERIAL SUSCEPTIBILITY PANEL BY YANCY     DAPTOmycin Sensitive 1 mcg/mL BACTERIAL SUSCEPTIBILITY PANEL BY YANCY     erythromycin Resistant >4 mcg/mL BACTERIAL SUSCEPTIBILITY PANEL BY YANCY     linezolid Sensitive 4 mcg/mL BACTERIAL SUSCEPTIBILITY PANEL BY YANCY     oxacillin Resistant >2 mcg/mL BACTERIAL SUSCEPTIBILITY PANEL BY YANCY     tetracycline Sensitive <=4 mcg/mL BACTERIAL SUSCEPTIBILITY PANEL BY YANCY     trimethoprim-sulfamethoxazole Sensitive <=0.5/9.5 mcg/mL BACTERIAL SUSCEPTIBILITY PANEL BY YANCY     vancomycin Sensitive 2 mcg/mL BACTERIAL SUSCEPTIBILITY PANEL BY YANCY         Antibiotics and immunizations:       Current antibiotics: All antibiotics and their doses were reviewed by me    Recent Abx Admin                   cefepime (MAXIPIME) 2000 mg IVPB minibag in NS (mg) 2,000 mg New Bag 05/29/22 0125    DAPTOmycin (CUBICIN) 450 mg in sodium chloride 0.9 % 50 mL IVPB (mg) 450 mg New Bag 05/28/22 1820                  Immunization History: All immunization history was reviewed by me today.     Immunization History   Administered Date(s) Administered    COVID-19, Paulmie Nachoier, Primary or Immunocompromised, PF, 100mcg/0.5mL 05/17/2021  Influenza A (H6M9-50) Vaccine PF IM 12/10/2009    Influenza Vaccine, unspecified formulation 12/13/2012, 11/06/2014, 10/13/2015, 09/01/2016, 11/03/2017, 10/26/2018, 09/20/2019    Influenza Virus Vaccine 12/19/2005, 12/14/2006, 12/13/2012, 11/30/2013, 10/13/2015, 09/20/2019    Influenza Whole 10/01/2007, 09/02/2010, 09/01/2011, 12/13/2012, 10/13/2015    Influenza, High Dose (Fluzone 65 yrs and older) 11/06/2014, 10/20/2020    Influenza, Quadv, IM, PF (6 mo and older Fluzone, Flulaval, Fluarix, and 3 yrs and older Afluria) 11/04/2021    PPD Test 09/16/1997    Pneumococcal Conjugate 13-valent (Lakcvju40) 12/29/2014    Pneumococcal Conjugate 7-valent (Prevnar7) 12/29/2014    Pneumococcal Polysaccharide (Cenpllyar26) 01/27/2014       Known drug allergies: All allergies were reviewed and updated    Allergies   Allergen Reactions    Bactrim [Sulfamethoxazole-Trimethoprim] Rash       Social history:     Social History:  All social andepidemiologic history was reviewed and updated by me today as needed. · Tobacco use:   reports that he quit smoking about 52 years ago. He has never used smokeless tobacco.  · Alcohol use:   reports no history of alcohol use. · Currently lives in: [address] [place] [address]  ·  reports no history of drug use.      COVID VACCINATION AND LAB RESULT RECORDS:     Internal Administration   First Dose COVID-19, Moderna, Primary or Immunocompromised, PF, 100mcg/0.5mL  05/17/2021   Second Dose           Last COVID Lab SARS-CoV-2, NAAT (no units)   Date Value   01/27/2022 Not Detected            Assessment:     The patient is a [de-identified] y.o. old male who  has a past medical history of BPH (benign prostatic hyperplasia), C. difficile colitis (04/11/2022), Carotid stenosis (12/2013), Cellulitis (12/2013), CHF (congestive heart failure) ([facility] [address] Chronic back pain, Diverticular disease, Encounter for imaging to screen for metal prior to MRI (05/26/2022), GERD (gastroesophageal reflux disease), History of atrial fibrillation, Hypercholesteremia, Hypertension, Lower GI bleed, MDRO (multiple drug resistant organisms) resistance (10/26/2019), Neuromuscular disorder ([facility] [address] Renal insufficiency, Septic arthritis of interphalangeal joint of toe of right foot ([facility] [address] (5/27/2022), and Vitamin B12 deficiency. with following problems:    · Complicated left foot infection with osteomyelitis of th eleft  fifth metatarsal bone and left proximal phalanx--wound culture positive for MRSA-vancomycin YANCY was 2, covered with daptomycin  · Right lower extremity cellulitis-improving  · Elevated sed rate   · Elevated CRP  · Essential hypertension-blood pressure okay  · Chronic neurogenic bladder  · Gastroesophageal reflux disease  · Mixed hyperlipidemia  · Obesity Class 1 due to excess calorie intake : Body mass index is 30.53 kg/m². ·       Discussion:      Left foot wound culture from 5/25/2022 has grown MRSA. YANCY for vancomycin was 2. I had switched patient from IV vancomycin to IV daptomycin yesterday. He is tolerating antibiotics okay. Fever curve is coming down. He is also on IV cefepime for empiric gram-negative coverage. Serum creatinine is 0.7. Baseline CK level was 41 yesterday    He has undergone left foot partial fifth ray resection with excisional debridement of muscle and fascia and application of Stravix graft today      Plan:     Diagnostic Workup:      · Continue to follow  fever curve, WBC count and blood cultures. · Continue to monitor blood counts, liver and renal function. Antimicrobials:    · Will continue IV daptomycin 450 mg every 24 hours for gram-positive coverage  · No evidence of gram-negative infection. We will discontinue IV cefepime today  · Contact isolation for MRSA  · We will follow up on the culture results and clinical progress and will make further recommendations accordingly. · Continue close vitals monitoring. · Maintain good glycemic control.   · Fall precautions. · Aspiration precautions. · Continue to watch for new fever or diarrhea. · DVT prophylaxis. · Discussed all above with patient and RN. Drug Monitoring:    · Continue monitoring for antibiotic toxicity as follows: CBC, CMP, CK  · Continue to watch for following: new or worsening fever, new hypotension, hives, lip swelling and redness or purulence at vascular access sites. I/v access Management:    · Continue to monitor i.v access sites for erythema, induration, discharge or tenderness. · As always, continue efforts to minimize tubes/lines/drains as clinically appropriate to reduce chances of line associated infections. Patient education and counseling:        · The patient was educated in detail about the side-effects of various antibiotics and things to watch for like new rashes, lip swelling, severe reaction, worsening diarrhea, break through fever etc.  · Discussed patient's condition and what to expect. All of the patient's questions were addressed in a satisfactory manner and patient verbalized understanding all instructions. Level of complexity of visit: High     Risk of Complications/Morbidity: High     · Illness(es)/ Infection present that pose threat to bodily function. · There is potential for severe exacerbation of infection/side effects of treatment. · Therapy requires intensive monitoring for antimicrobial agent toxicity. Weight loss counseling:    Extensive weight loss counseling was done. It is important to set a realistic weight loss goal. First goal should be to avoid gaining more weight and staying at current weight (or within 5 percent). People at high risk of developing diabetes who are able to lose 5 percent of their body weight and maintain this weight will reduce their risk of developing diabetes by about 50 percent and reduce their blood pressure.   Losing more than 15 percent of  body weight and staying at this weight is an extremely good result, even if you never reach your \"dream\" or \"ideal\" weight. Lifestyle changes including changing eating habits, substituting excess carbohydrates with proteins, stress reduction, using self-help programs like Weight Watchers®, Overeaters Anonymous®, and Take Off Pounds Sensibly (TOPS)© , following DASH diet and increasing exercise or walking briskly daily for half hour to and hour 5-7 days a week was suggested among other measures. Information was given about various weight loss education programs and their websites like www.cdc.gov/healthyweight, www.choosemyplate.gov and www.health.gov/dietaryguidelines/        TIME SPENT TODAY:     - Spent over  36 minutes on visit (including interval history, physical exam, review of data including labs, cultures, imaging, development and implementation of treatment plan and coordination of complex care). More than 50 percent of this includes face-to-face time spent with the patient for counseling and coordination of care. Thank you for involving me in the care of your patient. I will continue to follow. If you have anyadditional questions, please do not hesitate to contact me. Subjective: Interval history: Interval history was obtained from chart review and patient/ RN. He is afebrile. He is tolerating antibiotics okay. He has undergone left foot surgery today     REVIEW OF SYSTEMS:     Review of Systems   Constitutional: Negative for chills, diaphoresis and fever. HENT: Negative for ear discharge, ear pain, rhinorrhea, sore throat and trouble swallowing. Eyes: Negative for discharge and redness. Respiratory: Negative for cough, shortness of breath and wheezing. Cardiovascular: Negative for chest pain and leg swelling. Gastrointestinal: Negative for abdominal pain, constipation, diarrhea and nausea. Endocrine: Negative for polyuria. Genitourinary: Negative for dysuria, flank pain, frequency, hematuria and urgency.    Musculoskeletal: Positive for arthralgias. Negative for back pain and myalgias. Postop pain in left foot   Skin: Negative for rash. Neurological: Negative for dizziness, seizures and headaches. Hematological: Does not bruise/bleed easily. Psychiatric/Behavioral: Negative for hallucinations and suicidal ideas. All other systems reviewed and are negative. Past Medical History: All past medical history reviewed today. Past Medical History:   Diagnosis Date    BPH (benign prostatic hyperplasia)     C. difficile colitis 04/11/2022    Carotid stenosis 12/2013    JESU 19-22% stenosis; LICA 25-16% stenosis    Cellulitis 12/2013    LLE    CHF (congestive heart failure) (HCC)     Chronic back pain     Diverticular disease     Encounter for imaging to screen for metal prior to MRI 05/26/2022    [employer]: Synchromed II pump for baclofen -lfe    GERD (gastroesophageal reflux disease)     History of atrial fibrillation     Hypercholesteremia     Hypertension     Lower GI bleed     MDRO (multiple drug resistant organisms) resistance 10/26/2019    urine    Neuromuscular disorder ([facility])     spasticity    Renal insufficiency     Septic arthritis of interphalangeal joint of toe of right foot ([address] 5/27/2022    Vitamin B12 deficiency        Past Surgical History: All past surgical history was reviewed today. Past Surgical History:   Procedure Laterality Date    BACK SURGERY  2006    lower lumbar    CORONARY ARTERY BYPASS GRAFT  12/13/2013    CABG x 5 (Dr Tomas Oconnell), svg to diag, om1 and 3, distal rca, kelly to lad.      CYSTOSCOPY N/A 1/10/2019    CYSTOSCOPY performed by Rain Gomez MD at [facility] [address] Right 5/1/2015    ORIF    LEG SURGERY Right 11/2/2021    RIGHT LOWER EXTREMITY ADVANCEMENT FLAP AND SPLIT THICKNESS SKIN GRAFT PLACEMENT; (WOUND- 10 CM X 5.5 CM; CLOSURE- 6 CM X 5.5 CM; SKIN GRAFT- 7.2 CM X 5 CM) performed by Laura Castro MD at [address] [place] [address] [place] N/A 6/11/2020 back: Normal range of motion and neck supple. No rigidity. No muscular tenderness. Lymphadenopathy:      Cervical: No cervical adenopathy. Skin:     General: Skin is warm and dry. Coloration: Skin is not jaundiced. Findings: No erythema or rash. Comments: surgical dressing on the left foot noted   Neurological:      General: No focal deficit present. Mental Status: He is alert and oriented to person, place, and time. Mental status is at baseline. Motor: No abnormal muscle tone. Psychiatric:         Mood and Affect: Mood normal.         Behavior: Behavior normal.         Thought Content: Thought content normal.              Lines and drains: All vascular access sites are healthy with no local erythema, discharge or tenderness. Intake and output:    I/O last 3 completed shifts: In: 255 [P.O.:240; I.V.:15]  Out: 3775 [Urine:3775]    Lab Data:   All available labs and old records have been reviewed by me. CBC:  Recent Labs     05/29/22  0629   WBC 7.0   RBC 3.48*   HGB 10.4*   HCT 31.8*      MCV 91.4   MCH 29.8   MCHC 32.7   RDW 16.5*        BMP:  Recent Labs     05/29/22  0629      K 4.7      CO2 24   BUN 34*   CREATININE 0.7*   CALCIUM 8.5   GLUCOSE 88        Hepatic Function Panel:   Lab Results   Component Value Date    ALKPHOS 45 04/22/2022    ALT 25 04/22/2022    AST 20 04/22/2022    PROT 6.3 04/22/2022    BILITOT <0.2 04/22/2022    BILIDIR <0.2 06/20/2021    IBILI see below 06/20/2021    LABALBU 3.0 04/22/2022       CPK:   Lab Results   Component Value Date    CKTOTAL 41 05/28/2022     ESR:   Lab Results   Component Value Date    SEDRATE 48 (H) 05/25/2022     CRP:   Lab Results   Component Value Date    CRP 16.3 (H) 05/25/2022           Imaging: All pertinent images and reports for the current visit were reviewed by me during this visit. I reviewed the chest x-ray/CT scan/MRI images and independently interpreted the findings and results today.     XR FOOT LEFT (MIN 3 VIEWS)   Final Result      Postoperative changes of fifth transmetatarsal amputation. Expected postoperative soft tissue gas and swelling. Extensive osteopenia. XR CHEST (2 VW)   Final Result      Unchanged appearance of small loculated left pleural effusion or empyema. LASER SKIN PERFUSION PRESSURE STUDY   Final Result      MRI FOOT LEFT W WO CONTRAST   Final Result      1. Acute osteomyelitis in the 5th metatarsal bone and proximal phalanx with septic arthritis at the MTP joint. 2.  Regional cellulitis and myositis. 3.  No evidence of abscess. VL DUP LOWER EXTREMITY ARTERIES LEFT   Final Result      XR FOOT RIGHT (MIN 3 VIEWS)   Final Result      Limited radiographs without definite acute osseous abnormality. Consider repeat and provide information regarding specific area of concern. XR TIBIA FIBULA LEFT (2 VIEWS)   Final Result      No acute osseous findings. XR FOOT LEFT (MIN 3 VIEWS)   Final Result      Acute osteomyelitis in the 5th metatarsal bone. XR TIBIA FIBULA RIGHT (2 VIEWS)   Final Result      No acute fracture seen. Medications: All current and past medications were reviewed.      daptomycin (CUBICIN) IVPB  6 mg/kg (Ideal) IntraVENous Q24H    cefepime  2,000 mg IntraVENous Q12H    miconazole   Topical BID    [Held by provider] aspirin  81 mg Oral Daily    atorvastatin  80 mg Oral Nightly    gabapentin  600 mg Oral BID    levothyroxine  75 mcg Oral QAM AC    pantoprazole  40 mg Oral QAM AC    tamsulosin  0.8 mg Oral Daily    traZODone  25 mg Oral Nightly    vitamin B-12  1,000 mcg Oral Daily    sodium chloride flush  5-40 mL IntraVENous 2 times per day    sodium hypochlorite   Irrigation Daily        sodium chloride 10 mL (05/27/22 1125)       sodium chloride flush, sodium chloride, ondansetron **OR** ondansetron, polyethylene glycol, acetaminophen **OR** acetaminophen, traMADol      Problem list:       Patient Active Problem List   Diagnosis Code    Hypotension I95.9    Light headed R42    Cellulitis L03.90    Essential hypertension I10    GERD (gastroesophageal reflux disease) K21.9    Acute blood loss anemia D62    Tinea corporis B35.4    Carotid stenosis I65.29    CAD (coronary artery disease) I25.10    S/P CABG x 5 Z95.1    Lower GI bleeding K92.2    Hip fracture, right ([facility]) S72.001A    Acute right hip pain M25.551    Acute low back pain without sciatica M54.50    Hypothermia T68. Vashti Rung    Complicated UTI (urinary tract infection) N39.0    Edema R60.9    Problem with urinary catheter ([facility] [address] T83. 9XXA    Acute encephalopathy G93.40    Chronic indwelling Iglesias catheter Z97.8    Hyperlipidemia E78.5    Bilateral lower leg cellulitis L03.116, L03.115    Neurogenic bladder N31.9    Bacteriuria R82.71    Abnormality of urethral meatus Q64.70    Gross hematuria R31.0    CHF with unknown LVEF ([facility]) I50.9    Dyspnea R06.00    Bradycardia R00.1    Pseudomonas infection A49.8    Acute renal injury ([facility] [address] N17.9    Cellulitis of scrotum N49.2    Constipation K59.00    Encopresis with constipation and overflow incontinence R15.9    Abnormal stress test R94.39    H/O angiography Z92.89    Abnormal angiogram R93.89    Acute cystitis with hematuria N30.01    Acute renal failure superimposed on stage 3 chronic kidney disease ([facility]) N17.9, N18.30    Urinary tract infection associated with indwelling urethral catheter ([facility]) T83.511A, N39.0    Encephalopathy acute G93.40    Altered mental status R41.82    Hyperkalemia E87.5    Leg laceration, unspecified laterality, initial encounter S81.819A    Pain of right lower extremity M79.604    Degloving injury T14. 8XXA    Weakness R53.1    Symptomatic bradycardia R00.1    Bilateral leg edema R60.0    Symptomatic sinus bradycardia R00.1    Multiple drug resistant organism (MDRO) culture positive Z16.24    Osteomyelitis ([facility]) M86.9    Chronic multifocal osteomyelitis of right foot (HCC) M86.371    MRSA infection A49.02    Septic arthritis of interphalangeal joint of toe of right foot (HCC) M00.9    Elevated sed rate R70.0    Elevated C-reactive protein (CRP) R79.82    Infection requiring contact isolation precautions B99.9    Class 1 obesity due to excess calories with body mass index (BMI) of 30.0 to 30.9 in adult E66.09, Z68.30       Please note that this chart was generated using Dragon dictation software. Although every effort was made to ensure the accuracy of this automated transcription, some errors in transcription may have occurred inadvertently. If you may need any clarification, please do not hesitate to contact me through EPIC or at the phone number provided below with my electronic signature. Any pictures or media included in this note were obtained after taking informed verbal consent from the patient and with their approval to include those in the patient's medical record.       Kasi Yan MD, MPH, [address] [place] [address]  5/29/2022, 2:09 PM  [facility]   [facility] [address] [place] [address], [address] [place] [address]  Office: [phone]  Fax: [phone]  Clinic days:  Tuesday & Thursday

## 2022-05-29 NOTE — OP NOTE
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    Post-Op Diagnosis: Same       Procedure(s):  LEFT FOOT PARTIAL FIFTH RAY RESECTION WITH EXCISIONAL DEBRIDEMENT OF MUSCLE AND FASCIA, WITH APPLICATION OF GRAFT    Surgeon(s):  Mac Leggett DPM    Assistant:   Resident: Merrick Cuevas DPM     Hemostasis: Pneumatic Ankle Tourniquet at 250 mmHg (36 minutes)     Injectables: Pre-Op 20cc of 1% Lidocaine plain and Post-op 10cc of 0.5% Marcaine Plain     Materials: 2-0 Vicryl, 3-0 Vicryl, and 2x4 Stravix Graft    Anesthesia: General    Estimated Blood Loss (mL): less than 50     Complications: None    Specimens:   ID Type Source Tests Collected by Time Destination   A : left 5th metatarsal Tissue Tissue SURGICAL PATHOLOGY Mac Leggett DPM 5/29/2022 0805        Implants:  Implant Name Type Inv. Item Serial No.  Lot No. LRB No. Used Action   GRAFT HUM TISS [id number] CRYOPRESERVED PLCNTA TISS [employer] - [id number]  GRAFT HUM TISS [id number] CRYOPRESERVED PLCNTA TISS [employer] [id number] [employer]- [id number] Left 1 Implanted         Drains:   Negative Pressure Wound Therapy Leg Anterior; Lower;Right (Active)       Urinary Catheter (Active)   Catheter Indications Urinary retention (acute or chronic), continuous bladder irrigation or bladder outlet obstruction 05/29/22 0519   Site Assessment Red; Swelling 05/29/22 0519   Urine Color Yellow 05/29/22 0519   Urine Appearance Hazy 05/29/22 0519   Urine Odor Other (Comment) 05/29/22 0519   Collection Container Standard 05/29/22 0519   Securement Method Securing device (Describe) 05/29/22 0519   Catheter Care Completed Yes 05/28/22 1410   Catheter Best Practices  Catheter secured to thigh 05/29/22 0519   Status Draining;Patent 05/29/22 0519   Output (mL) 325 mL 05/28/22 1542       [REMOVED] Urinary Catheter Temperature probe (Removed) [REMOVED] Urinary Catheter (Removed)   Site Assessment Urethral drainage 04/25/22 1548   Urine Color Yellow 04/25/22 1548   Urine Appearance Clear 04/25/22 1548   Urine Odor Malodorous 04/25/22 1548   Collection Container Standard 04/25/22 1548   Securement Method Securing device (Describe) 04/25/22 1548   Catheter Care Completed Yes 04/25/22 1548   Catheter Best Practices  Catheter secured to thigh; Bag below bladder;Bag not on floor 04/25/22 1548   Status Draining;Patent 04/25/22 1548   Output (mL) 1750 mL 04/25/22 1548       Findings: 5th Metatarsal head noted to be necrotic but remaining portion of metatarsal was noted to be firm and hard. Scant purulence noted upon initial incision but no other pockets of purulence noted throughout procedure. Procedure was deemed to be curative. Detailed Description of Procedure:     INDICATIONS FOR PROCEDURE: This patient has signs and symptoms clinically and radiographically consistent with the above mentioned preoperative diagnosis. Having failed conservative treatment, it was determined that the patient would benefit from surgical intervention. All potential risks, benefits, and complications were discussed with the patient prior to the scheduling of surgery. All the patient's questions were answered and no guarantees were given. The patient wished to proceed with surgery, and informed written consent was obtained. DETAILS OF PROCEDURE: The patient was brought from the pre-operative area and placed on the operating table in the supine position. A pneumatic ankle tourniquet was placed around the patient's well-padded left lower extremity. Following IV sedation, a local anesthetic block was then injected proximal to the incision site consisting of 20 cc of 1% lidocaine plain with 10 cc injected in a V-type fashion along the patient's wound with remaining 10 cc injected in ankle block fashion. . The left lower extremity was then scrubbed, prepped, and draped in the usual sterile fashion. A time-out was performed. The patient, procedure, and operative site were confirmed. The patient's left leg was then elevated to help to exsanguinate the patient's left lower extremity. The tourniquet was then inflated to 250 mmHg and the following procedure was performed. PROCEDURE #1: Partial Fifth Ray Resection with Excisional Debridement of Muscle and Fascia    Attention was then directed towards the open wound on the left foot at the fifth ray. Using a #15 blade, a full thickness incision was made on the lateral aspect of the fifth metatarsal with attention made to excise all portions of the wound. The incision was carried down through the subcutaneous tissues using sharp dissection. All bleeders were electrocauterized along the way. The fifth digit was then sharply disarticulated using #15 blade and then passed to the back table to be discarded. Next, the deep fascial and periosteal layers of the fifth metatarsal were then reflected off using a McGlamery elevator to give full access to the 5th metatarsal head. The fifth metatarsal head was noted to be necrotic. Next, using a sagittal saw with a #138 blade, the distal 2/3 of the fifth metatarsal was resected and passed from the operating field and placed on the back table to be sent as a specimen to pathology for analysis. Upon grasping the resected metatarsal head to remove it from the field, it was easily punctured and crumbled in the operative site. This was clearly necrotic bone where the integrity of the cortex and medullary canal was compromised, however the proximal aspect of the resected portion of the metatarsal was noted to be firm, hard, and white. Attention was then directed towards the remaining devitalized soft tissue. The remaining necrotic tissue was then removed using sharp dissection until good bleeding healthy tissue was noted. Attention was then directed towards irrigation.      Using a pulse irrigation system, the surgical site was irrigated using approximately 3 L of normal saline. After copious irrigation the proximal portion of the incision site was then closed using 2-0 and 3-0 nylon with combination of simple interrupted and horizontal mattress sutures. PROCEDURE #2: Stravix Graft Application    Attention was directed to the surgical wound noted to the distal aspect of the incision site from where the full-thickness ulceration was removed. This portion was unable to to have the skin reapproximated with sutures as it would have caused too much tension around the skin. As a result, a decision was made to use a 2 x 4 inch Stravix Graft was placed over the full thickness wound according to the manufacturers instructions to help augment the healing process post-operatively. Special care was taken to make sure that the Stravix graft remained flat with underlying anatomical surface. Next, the graft was secured to the wound bed using 2-0 nylon at the periphery of the wound. Upon completion, the graft was noted to lie flat against the wound bed and be secured in correct position    At this time, a local anesthetic was injected about the incision sites consisting of 0.5% Marcaine plain, for the patient's postoperative comfort. A soft sterile dressing was applied consisting of Adaptic, dry gauze, Webril, and double 4 inch Ace. The pneumatic ankle tourniquet was rapidly deflated after a total time of 36 minutes and a prompt hyperemic response was noted on all aspects of the patient's left lower extremity. END OF PROCEDURE: The patient tolerated the procedure and anesthesia well and was transported from the operating room to the PACU with vital signs stable and vascular status intact to all aspects of the patient's left lower extremity and digital capillary refill time immediate to the digits of the left foot.  Following a period of post-operative monitoring, the patient will be brought back to the floor for continued medical management. The patient is to keep dressing clean, dry and intact at all times. This operative report was dictated on behalf of Dr. Rashad Ford DPM.    Disposition: Patient is s/p left foot partial 5th ray resection with graft application. Will follow up on intra-op path. Procedure was felt to be definitive. PT/OT evaluation pending. Patient is stable for discharge from a podiatric standpoint.      Electronically signed by Rebecca Perea DPM on 5/29/2022 at 12:07 PM

## 2022-05-29 NOTE — PROGRESS NOTES
PACU Transfer Note    Vitals:    05/29/22 0900   BP: 120/60   Pulse: 75   Resp: 11   Temp:    SpO2: 93%       In: 600 [I.V.:600]  Out: -     Pain assessment: no pain  Pain Level: 4    Report given to Receiving unit RN. Telephone report called to [employer]. Report included pt history, surgical procedure, intraop meds, head to toe assessment, xrays, VS, surgical dressing description, Prevalon boots, IVF and pain level.   She verbalized understanding  5/29/2022 9:16 AM

## 2022-05-29 NOTE — ANESTHESIA PRE PROCEDURE
Department of Anesthesiology  Preprocedure Note       Name:  Cristobal Parker   Age:  [de-identified] y.o.  :  1940                                          MRN:  [id number]         Date:  2022      Surgeon: Kerry Hicks):  Deborah Neal DPM    Procedure: Procedure(s):  LEFT FOOT PARTIAL FIFTH RAY RESECTION    Medications prior to admission:   Prior to Admission medications    Medication Sig Start Date End Date Taking? Authorizing Provider   Nutritional Supplements (RONEY PO) Take 1 packet by mouth 2 times daily    Historical Provider, MD   Nutritional Supplements (ENSURE HIGH PROTEIN) LIQD Take 1 Can by mouth 2 times daily    Historical Provider, MD   Wound Dressings (MEPILEX EX) Apply topically Cleanse skin graft areas with normal saline, pat dry, apply mepilex foam board, change every 3 days as needed.     Historical Provider, MD   traZODone (DESYREL) 50 MG tablet Take 25 mg by mouth nightly    Historical Provider, MD   loperamide (IMODIUM) 2 MG capsule Take 2 mg by mouth 4 times daily as needed for Diarrhea    Historical Provider, MD   Melatonin 5 MG CAPS Take 1 capsule by mouth nightly    Historical Provider, MD   levothyroxine (SYNTHROID) 75 MCG tablet Take 1 tablet by mouth Daily 2/3/22   Brenden Avila MD   albuterol sulfate HFA (PROVENTIL HFA) 108 (90 Base) MCG/ACT inhaler Inhale 2 puffs into the lungs every 6 hours as needed for Wheezing or Shortness of Breath 22   Arlyn Zacarias MD   guaiFENesin (MUCINEX) 600 MG extended release tablet Take 1 tablet by mouth 2 times daily as needed for Congestion 22   Arlyn Zacarias MD   ferrous sulfate (IRON 325) 325 (65 Fe) MG tablet Take 1 tablet by mouth 2 times daily 22   Arlyn Free, MD   Balsam [place] [facility]) OINT ointment Apply topically daily as needed    Historical Provider, MD   docusate sodium (COLACE) 100 MG capsule Take 100 mg by mouth daily     Historical Provider, MD   gabapentin (NEURONTIN) 600 MG tablet Take 600 mg by mouth 2 times vitamin B-12 (CYANOCOBALAMIN) tablet 1,000 mcg  1,000 mcg Oral Daily Giovanni Bernard MD   1,000 mcg at 05/28/22 0850    sodium chloride flush 0.9 % injection 5-40 mL  5-40 mL IntraVENous 2 times per day Giovanni Bernard MD   10 mL at 05/28/22 2036    sodium chloride flush 0.9 % injection 5-40 mL  5-40 mL IntraVENous PRN Giovanni Bernard MD        0.9 % sodium chloride infusion   IntraVENous PRN Giovanni Bernard MD 10 mL/hr at 05/27/22 1125 10 mL at 05/27/22 1125    ondansetron (ZOFRAN-ODT) disintegrating tablet 4 mg  4 mg Oral Q8H PRN Giovanni Bernard MD        Or    ondansetron (ZOFRAN) injection 4 mg  4 mg IntraVENous Q6H PRN Giovanni Bernard MD        polyethylene glycol (GLYCOLAX) packet 17 g  17 g Oral Daily PRN Giovanni Bernard MD   17 g at 05/28/22 1435    acetaminophen (TYLENOL) tablet 650 mg  650 mg Oral Q6H PRN Giovanni Bernard MD   650 mg at 05/29/22 0126    Or    acetaminophen (TYLENOL) suppository 650 mg  650 mg Rectal Q6H PRN Giovanni Bernard MD        traMADol (ULTRAM) tablet 25 mg  25 mg Oral Q6H PRN Giovanni Bernard MD   25 mg at 05/28/22 1332    sodium hypochlorite (DAKINS) 0.125 % external solution   Irrigation Daily Joya Denney, DPM   Given at 05/28/22 1150       Allergies: Allergies   Allergen Reactions    Bactrim [Sulfamethoxazole-Trimethoprim] Rash       Problem List:    Patient Active Problem List   Diagnosis Code    Hypotension I95.9    Light headed R42    Cellulitis L03.90    Essential hypertension I10    GERD (gastroesophageal reflux disease) K21.9    Acute blood loss anemia D62    Tinea corporis B35.4    Carotid stenosis I65.29    CAD (coronary artery disease) I25.10    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 XXXA    Complicated UTI (urinary tract infection) N39.0    Edema R60.9    Problem with urinary catheter ([facility] [address] T83. 9XXA    Acute encephalopathy G93.40    Chronic indwelling Iglesias catheter Z97.8    Hyperlipidemia E78.5    Bilateral lower leg cellulitis L03.116, L03.115    Neurogenic bladder N31.9    Bacteriuria R82.71    Abnormality of urethral meatus Q64.70    Gross hematuria R31.0    CHF with unknown LVEF ([facility]) I50.9    Dyspnea R06.00    Bradycardia R00.1    Pseudomonas infection A49.8    Acute renal injury ([facility] [address] N17.9    Cellulitis of scrotum N49.2    Constipation K59.00    Encopresis with constipation and overflow incontinence R15.9    Abnormal stress test R94.39    H/O angiography Z92.89    Abnormal angiogram R93.89    Acute cystitis with hematuria N30.01    Acute renal failure superimposed on stage 3 chronic kidney disease ([facility]) N17.9, N18.30    Urinary tract infection associated with indwelling urethral catheter ([facility]) T83.511A, N39.0    Encephalopathy acute G93.40    Altered mental status R41.82    Hyperkalemia E87.5    Leg laceration, unspecified laterality, initial encounter S81.819A    Pain of right lower extremity M79.604    Degloving injury T14. 8XXA    Weakness R53.1    Symptomatic bradycardia R00.1    Bilateral leg edema R60.0    Symptomatic sinus bradycardia R00.1    Multiple drug resistant organism (MDRO) culture positive Z16.24    Osteomyelitis ([facility]) M86.9    Chronic multifocal osteomyelitis of right foot ([facility]) M86.371    MRSA infection A49.02    Septic arthritis of interphalangeal joint of toe of right foot ([facility]) M00.9    Elevated sed rate R70.0    Elevated C-reactive protein (CRP) R79.82    Infection requiring contact isolation precautions B99.9    Class 1 obesity due to excess calories with body mass index (BMI) of 30.0 to 30.9 in adult E66.09, Z68.30       Past Medical History:        Diagnosis Date    BPH (benign prostatic hyperplasia)     C. difficile colitis 04/11/2022    Carotid stenosis 12/2013 JESU 50-26% stenosis; LICA 84-40% stenosis    Cellulitis 2013    LLE    CHF (congestive heart failure) (HCC)     Chronic back pain     Diverticular disease     Encounter for imaging to screen for metal prior to MRI 2022    [employer]: Synchromed II pump for baclofen -lfe    GERD (gastroesophageal reflux disease)     History of atrial fibrillation     Hypercholesteremia     Hypertension     Lower GI bleed     MDRO (multiple drug resistant organisms) resistance 10/26/2019    urine    Neuromuscular disorder (HCC)     spasticity    Renal insufficiency     Septic arthritis of interphalangeal joint of toe of right foot ([address] 2022    Vitamin B12 deficiency        Past Surgical History:        Procedure Laterality Date    BACK SURGERY  2006    lower lumbar    CORONARY ARTERY BYPASS GRAFT  2013    CABG x 5 (Dr Reva Hernández), svg to diag, om1 and 3, distal rca, kelly to lad.  CYSTOSCOPY N/A 1/10/2019    CYSTOSCOPY performed by Darlene Garcia MD at [facility] [address] 2015    ORIF    LEG SURGERY Right 2021    RIGHT LOWER EXTREMITY ADVANCEMENT FLAP AND SPLIT THICKNESS SKIN GRAFT PLACEMENT; (WOUND- 10 CM X 5.5 CM; CLOSURE- 6 CM X 5.5 CM; SKIN GRAFT- 7.2 CM X 5 CM) performed by Lissy Tafoya MD at [address] [place] [address] Avenue 2020    [address] performed by Mariah Trent MD at [address] [facility] N/A 2020    EGD BIOPSY performed by Mariah Trent MD at [address] [facility] History:    Social History     Tobacco Use    Smoking status: Former Smoker     Quit date: 1969     Years since quittin.5    Smokeless tobacco: Never Used   Substance Use Topics    Alcohol use:  No                                Counseling given: Not Answered      Vital Signs (Current):   Vitals:    22 1300 22 1939 22 0112 22 0515   BP: (!) 147/72 (!) 144/68 (!) 141/70 122/62   Pulse: 65 68 64 68   Resp: 16 19 18 16   Temp: 97.1 °F (36.2 °C) 97.4 °F (36.3 °C) 97.6 °F (36.4 °C) 98.5 °F (36.9 °C)   TempSrc: Oral Oral Oral Oral   SpO2: 95% 97% 95% 94%   Weight:       Height:                                                  BP Readings from Last 3 Encounters:   05/29/22 122/62   05/19/22 116/60   04/25/22 137/73       NPO Status:                                                                                 BMI:   Wt Readings from Last 3 Encounters:   05/25/22 218 lb 14.7 oz (99.3 kg)   04/22/22 232 lb 12.8 oz (105.6 kg)   04/11/22 220 lb (99.8 kg)     Body mass index is 30.53 kg/m². CBC:   Lab Results   Component Value Date    WBC 7.0 05/26/2022    RBC 3.70 05/26/2022    HGB 11.0 05/26/2022    HCT 34.1 05/26/2022    MCV 92.4 05/26/2022    RDW 17.0 05/26/2022     05/26/2022       CMP:   Lab Results   Component Value Date     05/26/2022    K 4.4 05/26/2022     05/26/2022    CO2 23 05/26/2022    BUN 24 05/26/2022    CREATININE 0.8 05/26/2022    GFRAA >60 05/26/2022    AGRATIO 0.9 04/22/2022    LABGLOM >60 05/26/2022    GLUCOSE 91 05/26/2022    PROT 6.3 04/22/2022    CALCIUM 8.5 05/26/2022    BILITOT <0.2 04/22/2022    ALKPHOS 45 04/22/2022    AST 20 04/22/2022    ALT 25 04/22/2022       POC Tests: No results for input(s): POCGLU, POCNA, POCK, POCCL, POCBUN, POCHEMO, POCHCT in the last 72 hours.     Coags:   Lab Results   Component Value Date    PROTIME 11.0 10/31/2021    INR 0.97 10/31/2021    APTT 42.3 10/31/2021       HCG (If Applicable): No results found for: PREGTESTUR, PREGSERUM, HCG, HCGQUANT     ABGs:   Lab Results   Component Value Date    PHART 7.391 01/28/2022    PO2ART 72.4 01/28/2022    XLJ7BLK 41.1 01/28/2022    VZT7XEV 25.0 01/28/2022    BEART 0 01/28/2022    I4SSFLMX 94 01/28/2022        Type & Screen (If Applicable):  No results found for: LABABO, LABRH    Drug/Infectious Status (If Applicable):  No results found for: HIV, HEPCAB    COVID-19 Screening (If Applicable):   Lab Results   Component Value Date    COVID19 Not Detected 01/27/2022           Anesthesia Evaluation  Patient summary reviewed and Nursing notes reviewed no history of anesthetic complications:   Airway: Mallampati: III  TM distance: >3 FB   Neck ROM: limited     Dental:    (+) poor dentition  Comment: Multiple missing cracked fractured broken    Pulmonary:normal exam    (+) shortness of breath:                             Cardiovascular:    (+) hypertension:, CAD:, CABG/stent:, CHF:,                   Neuro/Psych:   (+) neuromuscular disease:,             GI/Hepatic/Renal:   (+) GERD:,           Endo/Other:    (+) hypothyroidism::., .                 Abdominal:   (+) obese,           Vascular: negative vascular ROS. Other Findings:           Anesthesia Plan      general     ASA 3       Induction: intravenous. MIPS: Postoperative opioids intended and Prophylactic antiemetics administered. Anesthetic plan and risks discussed with patient.         Attending anesthesiologist reviewed and agrees with Preprocedure content                Zen Paz MD   5/29/2022

## 2022-05-29 NOTE — BRIEF OP NOTE
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    Post-Op Diagnosis: Same       Procedure(s):  LEFT FOOT PARTIAL FIFTH RAY RESECTION WITH EXCISIONAL DEBRIDEMENT OF MUSCLE AND FASCIA, WITH APPLICATION OF GRAFT    Surgeon(s):  Shon Tavera DPM    Assistant:  Resident: Nitza Duran DPM    Hemostasis: Pneumatic Ankle Tourniquet at 250 mmHg (36 minutes)    Injectables: Pre-Op 20cc of 1% Lidocaine plain and Post-op 10cc of 0.5% Marcaine Plain    Materials: 2-0 Vicryl, 3-0 Vicryl, and 2x4 Stravix Graft    Anesthesia: General    Estimated Blood Loss (mL): less than 50     Complications: None    Specimens:   ID Type Source Tests Collected by Time Destination   A : left 5th metatarsal Tissue Tissue SURGICAL PATHOLOGY Shon Tavera DPM 5/29/2022 0805        Implants:  Implant Name Type Inv. Item Serial No.  Lot No. LRB No. Used Action   GRAFT HUM TISS [id number] CRYOPRESERVED PLCNTA TISS [employer] - [id number]  GRAFT HUM TISS [id number] CRYOPRESERVED PLCNTA TISS [employer] AMNION [id number] [employer]- [id number] Left 1 Implanted         Drains:   Negative Pressure Wound Therapy Leg Anterior; Lower;Right (Active)       Urinary Catheter (Active)   Catheter Indications Urinary retention (acute or chronic), continuous bladder irrigation or bladder outlet obstruction 05/29/22 0519   Site Assessment Red; Swelling 05/29/22 0519   Urine Color Yellow 05/29/22 0519   Urine Appearance Hazy 05/29/22 0519   Urine Odor Other (Comment) 05/29/22 0519   Collection Container Standard 05/29/22 0519   Securement Method Securing device (Describe) 05/29/22 0519   Catheter Care Completed Yes 05/28/22 1410   Catheter Best Practices  Catheter secured to thigh 05/29/22 0519   Status Draining;Patent 05/29/22 0519   Output (mL) 325 mL 05/28/22 1542       [REMOVED] Urinary Catheter Temperature probe (Removed)       [REMOVED] Urinary Catheter (Removed)   Site Assessment Urethral drainage 04/25/22 1548   Urine Color Yellow 04/25/22 1548   Urine Appearance Clear 04/25/22 1548   Urine Odor Malodorous 04/25/22 1548   Collection Container Standard 04/25/22 1548   Securement Method Securing device (Describe) 04/25/22 1548   Catheter Care Completed Yes 04/25/22 1548   Catheter Best Practices  Catheter secured to thigh; Bag below bladder;Bag not on floor 04/25/22 1548   Status Draining;Patent 04/25/22 1548   Output (mL) 1750 mL 04/25/22 1548       Findings: See Op-Report    Disposition: Patient is s/p left foot partial 5th ray resection with graft application. Will follow up on intra-op path. Procedure was felt to be definitive. PT/OT evaluation pending. Patient is stable for discharge from a podiatric standpoint.      Electronically signed by Rachel Mckeon DPM on 5/29/2022 at 8:48 AM

## 2022-05-29 NOTE — DISCHARGE INSTR - COC
Continuity of Care Form    Patient Name: Josué Mark   :  1940  MRN:  [id number]    Admit date:  2022  Discharge date:  ***    Code Status Order: Full Code   Advance Directives:      Admitting Physician:  Karlo Reese MD  PCP: Shahab Padron    Discharging Nurse: [facility]/Room#: 2321/2554-66  Discharging Unit Phone Number: [phone]    Emergency Contact:   Extended Emergency Contact Information  Primary Emergency Contact: Lai Tolliver [address] Phone: [phone]  Relation: Child  [facility]: [phone]  Work Phone: [phone]  Mobile Phone: [phone]  Relation: Child    Past Surgical History:  Past Surgical History:   Procedure Laterality Date    BACK SURGERY  2006    lower lumbar    CORONARY ARTERY BYPASS GRAFT  2013    CABG x 5 (Dr Zahra Ortega), svg to diag, om1 and 3, distal rca, kelly to lad.      CYSTOSCOPY N/A 1/10/2019    CYSTOSCOPY performed by Primitivo Isaacs MD at [address] Right 2015    ORIF    LEG SURGERY Right 2021    RIGHT LOWER EXTREMITY ADVANCEMENT FLAP AND SPLIT THICKNESS SKIN GRAFT PLACEMENT; (WOUND- 10 CM X 5.5 CM; CLOSURE- 6 CM X 5.5 CM; SKIN GRAFT- 7.2 CM X 5 CM) performed by Reynaldo Steen MD at [place] [address] N/A 2020    [address] [place] [address] performed by Pradeep Cormier MD at [address] [place] [address] 2020    EGD BIOPSY performed by Pradeep Cormier MD at [facility] ENDOSCOPY       Immunization History:   Immunization History   Administered Date(s) Administered    COVID-19, Krish Betancur, Primary or Immunocompromised, PF, 100mcg/0.5mL 2021    Influenza A ([id number]) Vaccine PF IM 12/10/2009    Influenza Vaccine, unspecified formulation 2012, 2014, 10/13/2015, 2016, 2017, 10/26/2018, 2019    Influenza Virus Vaccine 2005, 2006, 2012, 11/30/2013, 10/13/2015, 09/20/2019    Influenza Whole 10/01/2007, 09/02/2010, 09/01/2011, 12/13/2012, 10/13/2015    Influenza, High Dose (Fluzone 65 yrs and older) 11/06/2014, 10/20/2020    Influenza, Quadv, IM, PF (6 mo and older Fluzone, Flulaval, Fluarix, and 3 yrs and older Afluria) 11/04/2021    PPD Test 09/16/1997    Pneumococcal Conjugate 13-valent (Duvdhhu44) 12/29/2014    Pneumococcal Conjugate 7-valent (Prevnar7) 12/29/2014    Pneumococcal Polysaccharide ([id number]) 01/27/2014       Active Problems:  Patient Active Problem List   Diagnosis Code    Hypotension I95.9    Light headed R42    Cellulitis L03.90    Essential hypertension I10    GERD (gastroesophageal reflux disease) K21.9    Acute blood loss anemia D62    Tinea corporis B35.4    Carotid stenosis I65.29    CAD (coronary artery disease) I25.10    S/P CABG 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 XXXA    Complicated UTI (urinary tract infection) N39.0    Edema R60.9    Problem with urinary catheter ([facility] T83. 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 8XXA    Weakness R53.1    Symptomatic bradycardia R00.1    Bilateral leg edema R60.0    Symptomatic sinus bradycardia R00.1    Multiple drug resistant organism (MDRO) culture positive Z16.24    Osteomyelitis (HCC) M86.9    Chronic multifocal osteomyelitis of right foot (HCC) M86.371    MRSA infection A49.02    Septic arthritis of interphalangeal joint of toe of right foot (HCC) M00.9    Elevated sed rate R70.0    Elevated C-reactive protein (CRP) R79.82    Infection requiring contact isolation precautions B99.9    Class 1 obesity due to excess calories with body mass index (BMI) of 30.0 to 30.9 in adult E66.09, Z68.30       Isolation/Infection:   Isolation            Contact          Patient Infection Status       Infection Onset Added Last Indicated Last Indicated By Review Planned Expiration Resolved Resolved By    MRSA 22 Culture, Wound        MDRO (multi-drug resistant organism) 22 Culture, Urine        Resolved    C-diff (Clostridium difficile) 22 C. difficile toxin Molecular   22 Lesvia Correa RN    Pt has had no inpatient admissions in past 30 days (diagnosed on  and discharged ), had been off antibiotics greater than 48 hrs (and had them restarted 22 upon admission) at the 30-day nuris ( prior to them being started    C-diff Rule Out 22 C. difficile toxin Molecular (Ordered)   22 Rule-Out Test Resulted    C-diff Rule Out 22 Clostridium Difficile Toxin/Antigen (Ordered)   22 Rule-Out Test Resulted    INFLUENZA 22 Rapid influenza A/B antigens   22     COVID-19 (Rule Out) 22 01/27/22 01/27/22 COVID-19, Rapid (Ordered)   01/27/22 Rule-Out Test Resulted    COVID-19 (Rule Out) 01/17/22 01/17/22 01/17/22 COVID-19, Rapid (Ordered)   01/17/22 Rule-Out Test Resulted    COVID-19 (Rule Out) 01/17/22 01/17/22 01/17/22 COVID-19, Rapid (Ordered)   01/17/22 Rule-Out Test Resulted    MDRO (multi-drug resistant organism) 06/05/21 06/08/21 06/05/21 Culture, Urine   09/27/21 Sara Correa RN    New urine cx on 6/20/2021 with no growth/was negative    COVID-19 (Rule Out) 04/24/21 04/24/21 04/24/21 COVID-19, Rapid (Ordered)   04/24/21 Rule-Out Test Resulted    C-diff Rule Out 04/21/21 04/21/21 04/22/21 GI Bacterial Pathogens By PCR (Ordered)   04/22/21 Denzel Dillard RN    Order noted to be discontinued by MD; not included in GI pathogens order    COVID-19 (Rule Out) 02/19/21 02/19/21 02/19/21 COVID-19, Rapid (Ordered)   02/19/21 Rule-Out Test Resulted    C-diff Rule Out 12/13/20 12/13/20 12/13/20 Clostridium difficile toxin/antigen (Ordered)   02/16/21 Jasmyn Contreras RN    No stool ever sent.      COVID-19 (Rule Out) 06/10/20 06/10/20 06/10/20 COVID-19 (Ordered)   06/10/20 Rule-Out Test Resulted    C-diff Rule Out 06/09/20 06/09/20 06/09/20 GI Bacterial Pathogens By PCR (Ordered)   06/10/20 Rule-Out Test Resulted    C-diff Rule Out 06/09/20 06/09/20 06/09/20 Clostridium difficile toxin/antigen (Ordered)   06/09/20 Rule-Out Test Resulted    C-diff Rule Out 05/03/20 05/04/20 05/04/20 C. difficile toxin Molecular (Ordered)   05/05/20 Rule-Out Test Resulted    MDRO (multi-drug resistant organism) 05/02/20 05/04/20 05/02/20 Culture, Urine   04/22/21 Sara Correa, RN    Has had new urine culture on 4/17/21 with mixed anne; no MDRO noted    COVID-19 (Rule Out) 05/03/20 05/03/20 05/03/20 COVID-19 (Ordered)   05/03/20 Rule-Out Test Resulted    C-diff Rule Out 05/02/20 05/02/20 05/03/20 Clostridium difficile toxin/antigen (Ordered)   05/03/20 Rule-Out Test Resulted    MRSA 02/07/20 02/08/20 02/07/20 MRSA DNA Probe, Nasal   05/04/20 Marcelino Leon    MDRO (multi-drug resistant organism) 10/26/19 10/28/19 11/19/19 Urine culture   01/16/20 Marcelino Leon            Nurse Assessment:  Last Vital Signs: /67   Pulse 74   Temp 97 °F (36.1 °C) (Axillary)   Resp 16   Ht 5' 11\" (1.803 m)   Wt 218 lb 14.7 oz (99.3 kg)   SpO2 96%   BMI 30.53 kg/m²     Last documented pain score (0-10 scale): Pain Level: 4  Last Weight:   Wt Readings from Last 1 Encounters:   05/25/22 218 lb 14.7 oz (99.3 kg)     Mental Status:  oriented, alert, coherent, logical, and thought processes intact    IV Access:  - None    Nursing Mobility/ADLs:  Walking   Dependent  Transfer  Assisted  Bathing  Assisted  Dressing  Assisted  Toileting  Assisted  Feeding  Assisted  Med Admin  Assisted  Med Delivery   whole    Wound Care Documentation and Therapy:  Negative Pressure Wound Therapy Leg Anterior; Lower;Right (Active)   Number of days: 208       Wound 11/01/21 Buttocks Right;Left redness, open areas, stage 2 ulcer (Active)   Number of days: 209       Wound 01/27/22 Sacrum Inner;Medial;Left;Right (Active)   Number of days: 122       Wound 01/27/22 Pretibial Right (Active)   Number of days: 122       Wound 01/27/22 Elbow Right;Posterior (Active)   Number of days: 122       Wound 05/26/22 Sacrum Medial (Active)   Wound Image   05/26/22 1148   Wound Etiology Pressure Stage 3 05/29/22 0519   Dressing Status Other (Comment) 05/29/22 0519   Wound Cleansed Other (Comment) 05/29/22 0519   Dressing/Treatment Zinc paste 05/29/22 0519   Dressing Change Due 05/29/22 05/29/22 0519   Wound Length (cm) 3 cm 05/26/22 1148   Wound Width (cm) 3 cm 05/26/22 1148   Wound Depth (cm) 0.2 cm 05/26/22 1148   Wound Surface Area (cm^2) 9 cm^2 05/26/22 1148   Wound Volume (cm^3) 1.8 cm^3 05/26/22 1148   Wound Assessment Etta/red;Slough 05/29/22 0519   Drainage Amount Scant 05/29/22 0519   Drainage Description Serosanguinous 05/29/22 0519   Odor None 05/29/22 0519   Usha-wound Assessment Blanchable erythema 05/29/22 0519   Margins Defined edges 05/29/22 0519   Number of days: 3       Incision 05/29/22 Foot Anterior; Left (Active)   Dressing Status Clean;Dry; Intact 05/29/22 0850   Drainage Amount None 05/29/22 0850   Number of days: 0       Incision 11/02/21 Thigh Anterior;Right (Active)   Number of days: 208        Elimination:  Continence: Bowel: No  Bladder: No  Urinary Catheter: Insertion Date: 05/26    Colostomy/Ileostomy/Ileal Conduit: No       Date of Last BM: 06/02    Intake/Output Summary (Last 24 hours) at 5/29/2022 1226  Last data filed at 5/29/2022 0850  Gross per 24 hour   Intake 600 ml   Output 3025 ml   Net -2425 ml     I/O last 3 completed shifts: In: 255 [P.O.:240; I.V.:15]  Out: 3775 [Urine:3775]    Safety Concerns: At Risk for Falls    Impairments/Disabilities:      None    Nutrition Therapy:  Current Nutrition Therapy:   - Oral Diet:  Carb Control 4 carbs/meal (1800kcals/day), Low Fat, and Low Sodium (2gm)    Routes of Feeding: Oral  Liquids: Thin Liquids  Daily Fluid Restriction: no  Last Modified Barium Swallow with Video (Video Swallowing Test): not done    Treatments at the Time of Hospital Discharge:   Respiratory Treatments: none  Oxygen Therapy:  is not on home oxygen therapy. Ventilator:    - No ventilator support    Heart Failure Instructions for Daily Management  Patient was treated for chronic diastolic heart failure. he  will require the following:    Please weigh daily on the same scale and approximately the same time of day. Report weight gain of 3 pounds/day or 5 pounds/week to : facility MD, INES TSANG None, and Hieu Renae / Jenny Soto [phone]. Please use hospital discharge weight as baseline reference.    Please monitor for signs and symptoms of and report to MD:  Worsening Heart Failure: sudden weight gain, shortness of breath, lower extremity or general edema/swelling, abdominal bloating/swelling, inability to lie flat, intolerance to usual activity, or cough (especially at night). Report these finding even if no increase in weight. Dehydration:  having difficulty or a decrease in urination, dizziness, worsening fatigue, or new onset/worsening of generalized weakness. Please continue a LOW SODIUM diet and LIMIT fluid intake to 48 - 64 ounces ( 1.5 - 2 liters) per day. Call Tameka Peral[address] None, facility MD, and [facility] / Elizabeth Aguillon [phone] with any questions or concerns. Please continue heart failure education to patient and family/support system. See After Visit Summary for hospital follow up appointment details. Consider spiritual care referral for support and/or completion of advance directives . Consider: having the facility MD complete required 7 day follow up, [place] [id number] telehealth program if patient agreeable and able to participate, and palliative care consult for ongoing goals of care, end of life, and/or chronic disease management discussions. Patient's primary cardiologist is Dr Elliott Severino. Rehab Therapies: Physical Therapy and Occupational Therapy  Weight Bearing Status/Restrictions: Non-weight bearing on left leg  Other Medical Equipment (for information only, NOT a DME order):  wheelchair and hospital bed  Other Treatments: zinc cream to butt    Patient's personal belongings (please select all that are sent with patient):  Cell phone, blanket, shirt, cloth bag, alert button, remote control.     RN SIGNATURE:  Electronically signed by Brittany Tejada RN on 6/2/22 at 4:19 PM EDT    CASE MANAGEMENT/SOCIAL WORK SECTION    Inpatient Status Date: 5/25/22    Readmission Risk Assessment Score:  Readmission Risk              Risk of Unplanned Readmission:  38           Discharging to Facility/ Eduardo Valverde at [facility] Details            [address] [place] [address]       Phone: [phone]       Fax: [address]: [address] [place] [address] Road Phone: [phone] Fax: [phone]    Dialysis Facility (if applicable)   NA    / signature: Electronically signed by ARIC Ellsworth LSW on 6/1/22 at 12:02 PM EDT    PHYSICIAN SECTION    Prognosis: Fair    Condition at Discharge: Stable    Rehab Potential (if transferring to Rehab): Good    Recommended Labs or Other Treatments After Discharge: follow up with your PCP in 1 week    Podiatry Wound Care Discharge Instructions  Please perform daily dressing changes to right lower extremity as follows  -Apply calcium alginate to the wound on the right foot and Adaptic to the right leg wound  -Next apply gauze to the top of the right foot, ankle, and leg  -Next loosely wrap the right lower extremity with Kerlix starting from just in front of the toes and ending just below the knee  -Next gently wrap the right foot with 4 inch Ace bandage starting from just in front of the toes and ending just above the ankle  -Next gently wrap the right leg with 6 inch Ace bandage starting from just above the ankle and ending just below the right knee  Patient is full weightbearing Right lower extremity    Please perform daily dressing changes to left lower extremity as follows  -Apply adaptic to graft/incision site on left foot and Adaptic to the left leg wound  -Next apply gauze to the top of the right foot, ankle, and leg  -Next loosely wrap the right lower extremity with Kerlix starting from just in front of the toes and ending just below the knee  -Next gently wrap the right foot with 4 inch Ace bandage starting from just in front of the toes and ending just above the ankle  -Next gently wrap the right leg with 6 inch Ace bandage starting from just above the ankle and ending just below the right knee  Patient is full weightbearing Right lower extremity    Physician Certification: I certify the above information and transfer of Mariann Reyes is necessary for the continuing treatment of the diagnosis listed and that he requires Home Care for greater 30 days.      Update Admission H&P: No change in H&P    PHYSICIAN SIGNATURE:  Electronically signed by Cristine Nguyen MD on 6/2/22 at 10:14 AM EDT

## 2022-05-30 PROCEDURE — 1200000000 HC SEMI PRIVATE

## 2022-05-30 PROCEDURE — 6370000000 HC RX 637 (ALT 250 FOR IP): Performed by: STUDENT IN AN ORGANIZED HEALTH CARE EDUCATION/TRAINING PROGRAM

## 2022-05-30 PROCEDURE — 2580000003 HC RX 258: Performed by: STUDENT IN AN ORGANIZED HEALTH CARE EDUCATION/TRAINING PROGRAM

## 2022-05-30 PROCEDURE — 6360000002 HC RX W HCPCS: Performed by: STUDENT IN AN ORGANIZED HEALTH CARE EDUCATION/TRAINING PROGRAM

## 2022-05-30 RX ADMIN — CYANOCOBALAMIN TAB 1000 MCG 1000 MCG: 1000 TAB at 08:45

## 2022-05-30 RX ADMIN — LEVOTHYROXINE SODIUM 75 MCG: 0.07 TABLET ORAL at 05:45

## 2022-05-30 RX ADMIN — GABAPENTIN 600 MG: 600 TABLET, FILM COATED ORAL at 08:45

## 2022-05-30 RX ADMIN — ATORVASTATIN CALCIUM 80 MG: 80 TABLET, FILM COATED ORAL at 20:32

## 2022-05-30 RX ADMIN — DAPTOMYCIN 450 MG: 500 INJECTION, POWDER, LYOPHILIZED, FOR SOLUTION INTRAVENOUS at 17:42

## 2022-05-30 RX ADMIN — TRAZODONE HYDROCHLORIDE 25 MG: 50 TABLET, FILM COATED ORAL at 20:33

## 2022-05-30 RX ADMIN — MICONAZOLE NITRATE: 2 POWDER TOPICAL at 20:34

## 2022-05-30 RX ADMIN — TRAMADOL HYDROCHLORIDE 25 MG: 50 TABLET, COATED ORAL at 23:34

## 2022-05-30 RX ADMIN — PANTOPRAZOLE SODIUM 40 MG: 40 TABLET, DELAYED RELEASE ORAL at 05:45

## 2022-05-30 RX ADMIN — MICONAZOLE NITRATE: 2 POWDER TOPICAL at 08:48

## 2022-05-30 RX ADMIN — TAMSULOSIN HYDROCHLORIDE 0.8 MG: 0.4 CAPSULE ORAL at 08:45

## 2022-05-30 RX ADMIN — SODIUM CHLORIDE, PRESERVATIVE FREE 10 ML: 5 INJECTION INTRAVENOUS at 20:33

## 2022-05-30 RX ADMIN — GABAPENTIN 600 MG: 600 TABLET, FILM COATED ORAL at 20:32

## 2022-05-30 RX ADMIN — SODIUM CHLORIDE, PRESERVATIVE FREE 10 ML: 5 INJECTION INTRAVENOUS at 08:45

## 2022-05-30 ASSESSMENT — PAIN SCALES - GENERAL
PAINLEVEL_OUTOF10: 0
PAINLEVEL_OUTOF10: 4

## 2022-05-30 ASSESSMENT — PAIN DESCRIPTION - LOCATION: LOCATION: FOOT

## 2022-05-30 NOTE — PLAN OF CARE
Problem: Discharge Planning  Goal: Discharge to home or other facility with appropriate resources  Outcome: Progressing     Problem: Pain  Goal: Verbalizes/displays adequate comfort level or baseline comfort level  Outcome: Progressing  Flowsheets (Taken 5/29/2022 0951 by Alejandra Patel RN)  Verbalizes/displays adequate comfort level or baseline comfort level:   Encourage patient to monitor pain and request assistance   Assess pain using appropriate pain scale   Administer analgesics based on type and severity of pain and evaluate response   Implement non-pharmacological measures as appropriate and evaluate response   Notify Licensed Independent Practitioner if interventions unsuccessful or patient reports new pain     Problem: Chronic Conditions and Co-morbidities  Goal: Patient's chronic conditions and co-morbidity symptoms are monitored and maintained or improved  Outcome: Progressing     Problem: Safety - Adult  Goal: Free from fall injury  Outcome: Progressing     Problem: Skin/Tissue Integrity  Goal: Absence of new skin breakdown  Description: 1. Monitor for areas of redness and/or skin breakdown  2. Assess vascular access sites hourly  3. Every 4-6 hours minimum:  Change oxygen saturation probe site  4. Every 4-6 hours:  If on nasal continuous positive airway pressure, respiratory therapy assess nares and determine need for appliance change or resting period.   Outcome: Progressing     Problem: ABCDS Injury Assessment  Goal: Absence of physical injury  Outcome: Progressing     Problem: Nutrition Deficit:  Goal: Optimize nutritional status  Outcome: Progressing

## 2022-05-30 NOTE — PLAN OF CARE
Problem: Discharge Planning  Goal: Discharge to home or other facility with appropriate resources  Outcome: Progressing     Problem: Pain  Goal: Verbalizes/displays adequate comfort level or baseline comfort level  Outcome: Progressing  Flowsheets (Taken 5/30/2022 0846)  Verbalizes/displays adequate comfort level or baseline comfort level:   Encourage patient to monitor pain and request assistance   Assess pain using appropriate pain scale   Implement non-pharmacological measures as appropriate and evaluate response   Administer analgesics based on type and severity of pain and evaluate response   Notify Licensed Independent Practitioner if interventions unsuccessful or patient reports new pain   Consider cultural and social influences on pain and pain management     Problem: Chronic Conditions and Co-morbidities  Goal: Patient's chronic conditions and co-morbidity symptoms are monitored and maintained or improved  Outcome: Progressing     Problem: Safety - Adult  Goal: Free from fall injury  Outcome: Progressing  Flowsheets (Taken 5/30/2022 0846)  Free From Fall Injury: Instruct family/caregiver on patient safety     Problem: Skin/Tissue Integrity  Goal: Absence of new skin breakdown  Description: 1. Monitor for areas of redness and/or skin breakdown  2. Assess vascular access sites hourly  3. Every 4-6 hours minimum:  Change oxygen saturation probe site  4. Every 4-6 hours:  If on nasal continuous positive airway pressure, respiratory therapy assess nares and determine need for appliance change or resting period.   Outcome: Progressing     Problem: ABCDS Injury Assessment  Goal: Absence of physical injury  Outcome: Progressing  Flowsheets (Taken 5/30/2022 0846)  Absence of Physical Injury: Implement safety measures based on patient assessment     Problem: Nutrition Deficit:  Goal: Optimize nutritional status  Outcome: Progressing

## 2022-05-30 NOTE — PROGRESS NOTES
Hospitalist Progress Note      PCP: INES BLANDON [address]    Date of Admission: 5/25/2022    Chief Complaint:     Chief Complaint   Patient presents with    Other     Sent to ED from [address] for redness and swelling to groin that pt reports he has had for a long time. Nursing home never called with report     Subjective:  Patient denies CP, SOB, HA and abdominal pain. No fevers. OR for L foot today.       PFHS: reviewed as documented 5/25/2022, no changes unless noted above    Medications:  Reviewed    Infusion Medications    sodium chloride 10 mL (05/27/22 1125)     Scheduled Medications    daptomycin (CUBICIN) IVPB  6 mg/kg (Ideal) IntraVENous Q24H    miconazole   Topical BID    [Held by provider] aspirin  81 mg Oral Daily    atorvastatin  80 mg Oral Nightly    gabapentin  600 mg Oral BID    levothyroxine  75 mcg Oral QAM AC    pantoprazole  40 mg Oral QAM AC    tamsulosin  0.8 mg Oral Daily    traZODone  25 mg Oral Nightly    vitamin B-12  1,000 mcg Oral Daily    sodium chloride flush  5-40 mL IntraVENous 2 times per day    sodium hypochlorite   Irrigation Daily     PRN Meds: sodium chloride flush, sodium chloride, ondansetron **OR** ondansetron, polyethylene glycol, acetaminophen **OR** acetaminophen, traMADol      Intake/Output Summary (Last 24 hours) at 5/29/2022 2111  Last data filed at 5/29/2022 0850  Gross per 24 hour   Intake 600 ml   Output 1700 ml   Net -1100 ml       Physical Exam    /66   Pulse 87   Temp 97.6 °F (36.4 °C) (Oral)   Resp 16   Ht 5' 11\" (1.803 m)   Wt 218 lb 14.7 oz (99.3 kg)   SpO2 92%   BMI 30.53 kg/m²     General appearance:  No acute distress, appears stated age  Eyes: Pupils equal, round, reactive to light, conjunctiva/corneas clear  Ears/Nose/Mouth/Throat: No external lesions or scars, hearing intact to voice  Neck: Trachea midline, no masses noted, no thyromegaly  Respiratory:  Non-labored breathing, clear to auscultation bilaterally  Cardiovascular: Regular rate and rhythm, no murmurs, gallops, or rubs  Abdomen: soft, non-tender, non-distended  Musculoskeletal: Warm, well perfused, no cyanosis or edema  Skin: Left foot bandaged, photos and ID and podiatry note  Psychiatric: A&Ox4, good insight and judgment    Labs:   Recent Labs     05/29/22  0629   WBC 7.0   HGB 10.4*   HCT 31.8*        Recent Labs     05/29/22  0629      K 4.7      CO2 24   BUN 34*   CREATININE 0.7*   CALCIUM 8.5     No results for input(s): AST, ALT, BILIDIR, BILITOT, ALKPHOS in the last 72 hours. Recent Labs     05/29/22  0629   INR 1.22*     Recent Labs     05/28/22 2211   CKTOTAL 41       Urinalysis:      Lab Results   Component Value Date    NITRU POSITIVE 04/22/2022    WBCUA 21-50 04/22/2022    BACTERIA 4+ 04/22/2022    RBCUA 3-4 04/22/2022    BLOODU Negative 04/22/2022    SPECGRAV <=1.005 04/22/2022    GLUCOSEU Negative 04/22/2022       Radiology:  XR FOOT LEFT (MIN 3 VIEWS)   Final Result      Postoperative changes of fifth transmetatarsal amputation. Expected postoperative soft tissue gas and swelling. Extensive osteopenia. XR CHEST (2 VW)   Final Result      Unchanged appearance of small loculated left pleural effusion or empyema. LASER SKIN PERFUSION PRESSURE STUDY   Final Result      MRI FOOT LEFT W WO CONTRAST   Final Result      1. Acute osteomyelitis in the 5th metatarsal bone and proximal phalanx with septic arthritis at the MTP joint. 2.  Regional cellulitis and myositis. 3.  No evidence of abscess. VL DUP LOWER EXTREMITY ARTERIES LEFT   Final Result      XR FOOT RIGHT (MIN 3 VIEWS)   Final Result      Limited radiographs without definite acute osseous abnormality. Consider repeat and provide information regarding specific area of concern. XR TIBIA FIBULA LEFT (2 VIEWS)   Final Result      No acute osseous findings.       XR FOOT LEFT (MIN 3 VIEWS)   Final Result      Acute osteomyelitis in the 5th metatarsal bone. XR TIBIA FIBULA RIGHT (2 VIEWS)   Final Result      No acute fracture seen. Assessment/Plan:    Active Hospital Problems    Diagnosis Date Noted    Weight loss counseling, encounter for [Z71.3]      Priority: Medium    Elevated sed rate [R70.0]      Priority: Medium    Elevated C-reactive protein (CRP) [R79.82]      Priority: Medium    Infection requiring contact isolation precautions [B99.9]      Priority: Medium    Class 1 obesity due to excess calories with body mass index (BMI) of 30.0 to 30.9 in adult [E66.09, Z68.30]      Priority: Medium    Chronic multifocal osteomyelitis of right foot ([address] Milton Lala 05/27/2022     Priority: Medium    MRSA infection [A49.02] 05/27/2022     Priority: Medium    Septic arthritis of interphalangeal joint of toe of right foot ([address] [M00.9] 05/27/2022     Priority: Medium    Osteomyelitis ([address] [M86.9] 05/25/2022     Priority: Medium       Plan:    #Acute osteomyelitis of the left fifth toe  #Chronic skin wounds  #Fungal infection/tinea curious  #Chronic indwelling Iglesias catheter  #Recent UTI with MDRO  #Recent C. difficile infection  #Chronic diastolic heart failure  #Bedridden  #History of paroxysmal A. fib  #Hypertension  #Hyperlipidemia  #GERD  #CAD  Appreciate ID and podiatry consultation  OR today  Analgesia as needed  Continue Dapto IV  Follow cultures    # Remainder of medical conditions  -home management except as above    DVT Prophylaxis: Lovenox  Diet: ADULT DIET; Regular; 4 carb choices (60 gm/meal); Low Fat/Low Chol/High Fiber/2 gm Na  Code Status: Full Code    PT/OT Eval Status: Ongoing    Dispo: Unclear (from [address] [facility], ? Back there upon DC)    Discussed with patient. OR for L foot OM today.     Harinder Argueta MD

## 2022-05-30 NOTE — PROGRESS NOTES
Podiatric Surgery Daily Progress Note      Admit Date: 5/25/2022      Code:Full Code    Patient seen and examined, labs and records reviewed    Subjective:     Patient seen at bedside this AM.  Patient is postop day 1 from a left foot partial fifth ray resection. Patient denies any overnight acute event. Patient denies f/c/n/v/sob/cp. Review of Systems: A 12 point review of symptoms is unremarkable with the exception of the chief complaint. Patient specifically denies nausea, fever, vomiting, chills, shortness of breath, chest pain, abdominal pain, constipation or difficulty urinating. Objective     /67   Pulse 65   Temp 98.7 °F (37.1 °C) (Oral)   Resp 16   Ht 5' 11\" (1.803 m)   Wt 218 lb 14.7 oz (99.3 kg)   SpO2 92%   BMI 30.53 kg/m²      I/O:    Intake/Output Summary (Last 24 hours) at 5/30/2022 0809  Last data filed at 5/29/2022 2340  Gross per 24 hour   Intake 600 ml   Output 1350 ml   Net -750 ml              Wt Readings from Last 3 Encounters:   05/25/22 218 lb 14.7 oz (99.3 kg)   04/22/22 232 lb 12.8 oz (105.6 kg)   04/11/22 220 lb (99.8 kg)       LABS:    Recent Labs     05/29/22  0629   WBC 7.0   HGB 10.4*   HCT 31.8*           Recent Labs     05/29/22  0629      K 4.7      CO2 24   BUN 34*   CREATININE 0.7*        Recent Labs     05/29/22  0629   INR 1.22*       LOWER EXTREMITY EXAMINATION    Dressing to bilateral LE intact. No strikethrough noted to the external dressing. Mild serosanguineous drainage noted to the internal layers of the dressing noted to right lower extremity. VASCULAR: DP and PT pulses nonpalpable 0/4 b/l. Upon handheld doppler examination, DP and PT pulses were noted to have biphasic signals b/l. CFT is brisk to the digits of the foot b/l. Skin temperature is warm to wark from proximal to distal with focal calor noted right lower extremity. +2 pitting edema noted bilateral lower extremities, right greater than left.  No pain with calf compression b/l.     NEUROLOGIC: Gross and epicritic sensation is intact b/l. Protective sensation is diminished at all pedal sites b/l.     DERMATOLOGIC: Diffuse dermatologic changes noted to b/l LE with erythema present from just proximal to the ankle joint to just distal to the knee joint which does not resolve with extremity elevation.      Right lower extremity:  Multiple full-thickness ulcerations noted to the anterior aspect and anterior lateral aspect of the right leg. Wound base is a combination of granular, and fibrotic tissue. Periwound erythema noted. No purulent drainage present. No malodor noted. No fluctuance or crepitus noted. Superficial ulceration noted to the dorsal medial aspect of the right fifth digit. No acute signs of infection noted. Full-thickness ulceration noted at the fifth metatarsal head. Wound base is a combination of granular and necrotic tissue. Wound measures approximately 2 cm x 2 cm x 0.1 cm. No purulent drainage expressed. No tracking, tunneling, crepitus, fluctuance, or malodor noted. Full-thickness ulceration noted just proximal to the above mentioned wound on the fifth metatarsal.  Wound base is granular in nature. The wound measures approximately 2 cm x 1.5 cm x 0.1 cm. No purulent drainage expressed. No tracking, tunneling, crepitus, fluctuance, or malodor noted. Deep tissue injury noted to the posterior lateral aspect of the right lower extremity. No fluctuance or crepitus noted. No purulent drainage expressed. No malodor noted. Patient provided verbal consent for patient obtained today:              Left lower extremity dressing was left clean, dry, and intact.     MUSCULOSKELETAL: Muscle strength is 4/5 for all pedal groups tested. Mild pain with palpation of the periwound areas to bilateral lower extremities. Ankle joint ROM is decreased in dorsiflexion with the knee extended. No obvious biomechanical abnormalities.      IMAGING:  XR Tib/Fib Right (5/25/2022)  Narrative   RIGHT TIBIA FIBULA  4 views       HISTORY: Pain       COMPARISON: none       FINDINGS:       No evidence of acute fracture or traumatic bony abnormality is seen.       Osteopenia is noted.           Impression       No acute fracture seen.      XR Left Foot (5/25/2022)  Narrative   EXAM: LEFT FOOT 3 VIEWS       INDICATION: Left 5th toe wound.       COMPARISON: 1/14/20       FINDINGS:       Wound or ulceration identified over the 5th MTP region. There is loss of cortical margin laterally suggesting erosive changes in the neck and head of the metatarsal bone. Bones are otherwise severely osteopenic. No fracture identified. Extensive vascular    calcifications.           Impression       Acute osteomyelitis in the 5th metatarsal bone.          XR foot right (5/25/2022)  Narrative   EXAM: RIGHT FOOT 3 VIEWS       INDICATION: Right foot wound       COMPARISON: None       FINDINGS:       Suboptimal positioning. Artifact from patient motion on the oblique view. Findings significantly limited exam in conjunction with the severe osteopenia. No definite erosion or fracture seen. There is diffuse soft tissue swelling. No definite soft tissue    gas.           Impression       Limited radiographs without definite acute osseous abnormality. Consider repeat and provide information regarding specific area of concern. XR Tib/Fib Left (5/25/2022)  Narrative   EXAM: LEFT TIBIA-FIBULA 2 VIEWS       INDICATION: Left leg wound       COMPARISON: None       FINDINGS:       Bones are diffusely osteopenic. No acute fracture or malalignment. No erosions seen. Diffuse vascular calcifications. Diffuse soft tissue swelling.           Impression       No acute osseous findings.      Arterial Duplex Left Lower Extremity (5/26/2022)  Arterial Duplex Scan: Grayscale and color imaging of the extremity arteries,   including Doppler spectral waveform analysis.       Impressions   Right Impression   The ankle/brachial index is non-compressible (, PT >260mmHg). Left Impression   The ankle/brachial index is 1.13 (, PT 158mmHg). Multiphasic flow in the common femoral artery indicates no significant   aorto-iliac inflow disease. Atherosclerotic plaque in the common femoral artery and popliteal artery is   consistent with <50% stenosis. The superficial femoral artery appears to have multiphasic Doppler signals,   but is difficult to see in b-mode due to edema. The tibial trunk is not visualized, possibly due to edema, and the peroneal   artery is poorly seen, and is possibly patent. No previous left leg arterial duplex studies for comparison.       Conclusions        Summary        Nondiagnostic right ROSARIO due to vessel noncompressibility.    Normal left ROSARIO at rest.        Less than 50% stensoes of CFA and popliteal artery however this study is not    diagnostic due to imaging difficulties associated with leg edema. Clinical    correlation recommended. MRI Left Foot (5/27/2022)  Narrative   MRI FOOT LEFT W WO CONTRAST       Indication: Osteomyelitis of 5th metatarsal, left foot       Comparison: Radiograph 5/25/22       Technique: Multiplanar multisequence MR imaging of the left foot was performed without intravenous contrast.        Findings:       Wound or ulceration laterally over the forefoot 5th MTP joint region. 5th metatarsal head and neck erosions, confluent marrow placement and postcontrast enhancement extending as far proximally as the mid shaft consistent with acute osteomyelitis. Enhancement/synovitis in the MTP joint and diffuse marrow replacement in the proximal phalanx, consistent with septic arthritis and osteomyelitis, respectively.       Cellulitis and myositis in the soft tissues around the infected bone. No evidence of abscess. There is more diffuse nonspecific edema signal extending over the dorsum of the foot in the subcutaneous space. No significant tenosynovitis.  Extensive diffuse atrophy throughout the intrinsic foot muscles consistent with chronic denervation.           Impression       1.  Acute osteomyelitis in the 5th metatarsal bone and proximal phalanx with septic arthritis at the MTP joint. 2.  Regional cellulitis and myositis. 3.  No evidence of abscess. Laser Skin Perfusion 5/27/2022  Tech Comments   Right   Pulse volume recording at the level of the foot reveals waveforms within   normal limits. Laser skin perfusion pressure study at the transmetatarsal level ([place]) is   148 mmHg; wound healing likely. This is considered to have a good probability   for healing. Laser skin perfusion pressure study at the transmetatarsal level (Medial   plantar) is 112 mmHg; wound healing likely. This is considered to have a good   probability for healing. Left   Pulse volume recording at the level of the foot reveals waveforms within   normal limits. Laser skin perfusion pressure study at the transmetatarsal level ([place]) is   100 mmHg; wound healing likely. This is considered to have a good probability   for healing. Laser skin perfusion pressure study at the transmetatarsal level (Medial   Plantar) is 82 mmHg; wound healing likely. This is considered to have a good   probability for healing. There are no previous studies for comparison.        ASSESSMENT/PLAN  -s/p left foot partial fifth ray resection (DOS 5/29/2022)  -Osteomyelitis, left fifth metatarsal  -Cellulitis, right lower extremity  -Serous bulla, left dorsal fourth interspace  -Full-thickness ulceration, distal lateral left foot-Bueno stage III  -Full-thickness ulceration, anterior left leg-Bueno stage II  -Full-thickness ulceration, right leg-Bueno stage II  -Full-thickness ulcerations, distal lateral right foot-Bueno stage II  -Superficial thickness ulceration, right fifth digit-Bueno stage I  -Deep tissue injury, bilateral heels    -Patient seen and examined at bedside this AM  -VSS, No updated CBC this a.m.  -ESR 48 and CRP 16.3  -lactic acid 0.8  -prealbumin 12.4; dietitian is on board  -Imagings reviewed, impression as noted above  -Wound culture: MRSA  -Surgical pathology pending  -Continue IV antibiotics per infectious disease  -Right lower extremity dressed with Adaptic, wet to dry gauze, DSD, and Ace  -Left lower extremity was left clean, dry, and intact.  -Prevalon boots re-applied. Patient is to wear at all times while in bed to prevent further deep tissue injury.  -Patient is nonweightbearing at baseline    DISPO: Patient is s/p left foot partial 5th ray resection with graft application. Will follow up on intra-op path. Procedure was felt to be definitive. PT/OT evaluation pending. Patient is stable for discharge from a podiatric standpoint.      Patient is to be staffed with Dr. Jayshree Herrera DPM.    Nitza Duran DPM  Podiatric Resident, PGY-1  Pager #: [phone] or Perfect Serve

## 2022-05-30 NOTE — PROGRESS NOTES
Hospitalist Progress Note      PCP: INES BLANDON [address]    Date of Admission: 5/25/2022    Chief Complaint:     Chief Complaint   Patient presents with    Other     Sent to ED from [address] for redness and swelling to groin that pt reports he has had for a long time. Nursing home never called with report     Subjective:  Patient denies CP, SOB, HA and abdominal pain. No fevers.   Foot is numb today      PFHS: reviewed as documented 5/25/2022, no changes unless noted above    Medications:  Reviewed    Infusion Medications    sodium chloride 10 mL (05/27/22 1125)     Scheduled Medications    daptomycin (CUBICIN) IVPB  6 mg/kg (Ideal) IntraVENous Q24H    miconazole   Topical BID    [Held by provider] aspirin  81 mg Oral Daily    atorvastatin  80 mg Oral Nightly    gabapentin  600 mg Oral BID    levothyroxine  75 mcg Oral QAM AC    pantoprazole  40 mg Oral QAM AC    tamsulosin  0.8 mg Oral Daily    traZODone  25 mg Oral Nightly    vitamin B-12  1,000 mcg Oral Daily    sodium chloride flush  5-40 mL IntraVENous 2 times per day    sodium hypochlorite   Irrigation Daily     PRN Meds: sodium chloride flush, sodium chloride, ondansetron **OR** ondansetron, polyethylene glycol, acetaminophen **OR** acetaminophen, traMADol      Intake/Output Summary (Last 24 hours) at 5/30/2022 1430  Last data filed at 5/30/2022 0846  Gross per 24 hour   Intake 240 ml   Output 1350 ml   Net -1110 ml       Physical Exam    /63   Pulse 63   Temp 98.8 °F (37.1 °C) (Oral)   Resp 15   Ht 5' 11\" (1.803 m)   Wt 218 lb 14.7 oz (99.3 kg)   SpO2 94%   BMI 30.53 kg/m²     General appearance:  No acute distress, appears stated age  Eyes: Pupils equal, round, reactive to light, conjunctiva/corneas clear  Ears/Nose/Mouth/Throat: No external lesions or scars, hearing intact to voice  Neck: Trachea midline, no masses noted, no thyromegaly  Respiratory:  Non-labored breathing, clear to auscultation bilaterally  Cardiovascular: Regular rate and rhythm, no murmurs, gallops, or rubs  Abdomen: soft, non-tender, non-distended  Musculoskeletal: Warm, well perfused, no cyanosis or edema  Skin: Left foot bandaged, photos and ID and podiatry note  Psychiatric: A&Ox4, good insight and judgment    Labs:   Recent Labs     05/29/22  0629   WBC 7.0   HGB 10.4*   HCT 31.8*        Recent Labs     05/29/22  0629      K 4.7      CO2 24   BUN 34*   CREATININE 0.7*   CALCIUM 8.5     No results for input(s): AST, ALT, BILIDIR, BILITOT, ALKPHOS in the last 72 hours. Recent Labs     05/29/22  0629   INR 1.22*     Recent Labs     05/28/22 2211   CKTOTAL 41       Urinalysis:      Lab Results   Component Value Date    NITRU POSITIVE 04/22/2022    WBCUA 21-50 04/22/2022    BACTERIA 4+ 04/22/2022    RBCUA 3-4 04/22/2022    BLOODU Negative 04/22/2022    SPECGRAV <=1.005 04/22/2022    GLUCOSEU Negative 04/22/2022       Radiology:  XR FOOT LEFT (MIN 3 VIEWS)   Final Result      Postoperative changes of fifth transmetatarsal amputation. Expected postoperative soft tissue gas and swelling. Extensive osteopenia. XR CHEST (2 VW)   Final Result      Unchanged appearance of small loculated left pleural effusion or empyema. LASER SKIN PERFUSION PRESSURE STUDY   Final Result      MRI FOOT LEFT W WO CONTRAST   Final Result      1. Acute osteomyelitis in the 5th metatarsal bone and proximal phalanx with septic arthritis at the MTP joint. 2.  Regional cellulitis and myositis. 3.  No evidence of abscess. VL DUP LOWER EXTREMITY ARTERIES LEFT   Final Result      XR FOOT RIGHT (MIN 3 VIEWS)   Final Result      Limited radiographs without definite acute osseous abnormality. Consider repeat and provide information regarding specific area of concern. XR TIBIA FIBULA LEFT (2 VIEWS)   Final Result      No acute osseous findings.       XR FOOT LEFT (MIN 3 VIEWS)   Final Result      Acute osteomyelitis in the 5th metatarsal bone. XR TIBIA FIBULA RIGHT (2 VIEWS)   Final Result      No acute fracture seen. Assessment/Plan:    Active Hospital Problems    Diagnosis Date Noted    Weight loss counseling, encounter for [Z71.3]      Priority: Medium    Elevated sed rate [R70.0]      Priority: Medium    Elevated C-reactive protein (CRP) [R79.82]      Priority: Medium    Infection requiring contact isolation precautions [B99.9]      Priority: Medium    Class 1 obesity due to excess calories with body mass index (BMI) of 30.0 to 30.9 in adult [E66.09, Z68.30]      Priority: Medium    Chronic multifocal osteomyelitis of right foot ([address] Jewelene Shoulder 05/27/2022     Priority: Medium    MRSA infection [A49.02] 05/27/2022     Priority: Medium    Septic arthritis of interphalangeal joint of toe of right foot ([address] [M00.9] 05/27/2022     Priority: Medium    Osteomyelitis ([address] [M86.9] 05/25/2022     Priority: Medium       Plan:    #Acute osteomyelitis of the left fifth toe  #Chronic skin wounds  #Fungal infection/tinea curious  #Chronic indwelling Iglesias catheter  #Recent UTI with MDRO  #Recent C. difficile infection  #Chronic diastolic heart failure  #Bedridden  #History of paroxysmal A. fib  #Hypertension  #Hyperlipidemia  #GERD  #CAD  Appreciate ID and podiatry consultation  F/U OR pathology/cx and ID recommendations  Analgesia as needed  Continue Dapto IV  Follow cultures    # Remainder of medical conditions  -home management except as above    DVT Prophylaxis: Lovenox  Diet: ADULT DIET; Regular; 4 carb choices (60 gm/meal); Low Fat/Low Chol/High Fiber/2 gm Na  Code Status: Full Code    PT/OT Eval Status: Ongoing    Dispo: Unclear (from [address] senior care, ? Back there upon DC)    Discussed with patient and nursing. ? senior care vs SNF upon DC when ok with ID.     Lauri Guerra MD

## 2022-05-31 PROCEDURE — 1200000000 HC SEMI PRIVATE

## 2022-05-31 PROCEDURE — 6370000000 HC RX 637 (ALT 250 FOR IP): Performed by: STUDENT IN AN ORGANIZED HEALTH CARE EDUCATION/TRAINING PROGRAM

## 2022-05-31 PROCEDURE — 2580000003 HC RX 258: Performed by: STUDENT IN AN ORGANIZED HEALTH CARE EDUCATION/TRAINING PROGRAM

## 2022-05-31 PROCEDURE — 99233 SBSQ HOSP IP/OBS HIGH 50: CPT | Performed by: INTERNAL MEDICINE

## 2022-05-31 PROCEDURE — 6360000002 HC RX W HCPCS: Performed by: STUDENT IN AN ORGANIZED HEALTH CARE EDUCATION/TRAINING PROGRAM

## 2022-05-31 RX ADMIN — PANTOPRAZOLE SODIUM 40 MG: 40 TABLET, DELAYED RELEASE ORAL at 06:43

## 2022-05-31 RX ADMIN — SODIUM CHLORIDE, PRESERVATIVE FREE 10 ML: 5 INJECTION INTRAVENOUS at 08:33

## 2022-05-31 RX ADMIN — DAPTOMYCIN 450 MG: 500 INJECTION, POWDER, LYOPHILIZED, FOR SOLUTION INTRAVENOUS at 17:53

## 2022-05-31 RX ADMIN — ATORVASTATIN CALCIUM 80 MG: 80 TABLET, FILM COATED ORAL at 20:38

## 2022-05-31 RX ADMIN — TRAMADOL HYDROCHLORIDE 25 MG: 50 TABLET, COATED ORAL at 23:10

## 2022-05-31 RX ADMIN — GABAPENTIN 600 MG: 600 TABLET, FILM COATED ORAL at 20:38

## 2022-05-31 RX ADMIN — CYANOCOBALAMIN TAB 1000 MCG 1000 MCG: 1000 TAB at 08:31

## 2022-05-31 RX ADMIN — MICONAZOLE NITRATE: 2 POWDER TOPICAL at 08:33

## 2022-05-31 RX ADMIN — TRAZODONE HYDROCHLORIDE 25 MG: 50 TABLET, FILM COATED ORAL at 20:38

## 2022-05-31 RX ADMIN — SODIUM CHLORIDE, PRESERVATIVE FREE 10 ML: 5 INJECTION INTRAVENOUS at 20:39

## 2022-05-31 RX ADMIN — GABAPENTIN 600 MG: 600 TABLET, FILM COATED ORAL at 08:31

## 2022-05-31 RX ADMIN — TAMSULOSIN HYDROCHLORIDE 0.8 MG: 0.4 CAPSULE ORAL at 08:31

## 2022-05-31 RX ADMIN — MICONAZOLE NITRATE: 2 POWDER TOPICAL at 20:40

## 2022-05-31 RX ADMIN — LEVOTHYROXINE SODIUM 75 MCG: 0.07 TABLET ORAL at 06:43

## 2022-05-31 ASSESSMENT — PAIN SCALES - GENERAL
PAINLEVEL_OUTOF10: 0
PAINLEVEL_OUTOF10: 0
PAINLEVEL_OUTOF10: 4

## 2022-05-31 ASSESSMENT — PAIN DESCRIPTION - DESCRIPTORS: DESCRIPTORS: ACHING

## 2022-05-31 ASSESSMENT — PAIN DESCRIPTION - LOCATION: LOCATION: FOOT

## 2022-05-31 NOTE — CARE COORDINATION
Pt is from [facility], will return at DC. Per staff at [address] [place] [address], pt is non-ambulatory, and staff use a raymundo lift and assist with all ADLs. CM will continue to follow for DC needs and recs.         Electronically signed by Graham Fay, MSW, LSW on 5/31/2022 at 4:30 PM

## 2022-05-31 NOTE — PROGRESS NOTES
Occupational Therapy/Physical Therapy  Discharge    OT/PT orders received, chart reviewed, called [address]. Per Rn at [facility] pt is non-ambulatory, raymundo lift is used to transfer patient. He is dep with all ADLs in bed. Pt is not appropriate for acute OT/PT evaluations or treatment. Anticipate pt will return to prior setting with prior care at discharge. Will sign off. Rn informed.     Maggie Miller OTR/NEIDA Salmon [address] [place] [address] [facility]

## 2022-05-31 NOTE — PROGRESS NOTES
ID Follow-up NOTE    CC:   DM foot and leg ulcers, L 5th MT osteomyelitis  Antibiotics: Daptomycin    Admit Date: 2022  Hospital Day: 7    Subjective:     POD#2 L partial 5th ray resection    Patient reports some L AND R foot pain      Objective:     Patient Vitals for the past 8 hrs:   BP Pulse Resp   22 0725 121/64 53 16     I/O last 3 completed shifts: In: 720 [P.O.:720]  Out: 4775 [Urine:4775]  No intake/output data recorded. EXAM:  GENERAL: No apparent distress.     HEENT: Membranes moist, no oral lesion  NECK:  Supple, no lymphadenopathy  LUNGS: Clear b/l, no rales, no dullness  CARDIAC: RRR, no murmur appreciated  ABD:  + BS, soft / NT  EXT:  No rash, no edema, no lesions  Bilateral LE dressings   NEURO: No focal neurologic findings  PSYCH: Orientation, sensorium, mood normal  LINES:  Peripheral iv       Data Review:  Lab Results   Component Value Date    WBC 7.0 2022    HGB 10.4 (L) 2022    HCT 31.8 (L) 2022    MCV 91.4 2022     2022     Lab Results   Component Value Date    CREATININE 0.7 (L) 2022    BUN 34 (H) 2022     2022    K 4.7 2022     2022    CO2 24 2022       Hepatic Function Panel:   Lab Results   Component Value Date    ALKPHOS 45 2022    ALT 25 2022    AST 20 2022    PROT 6.3 2022    BILITOT <0.2 2022    BILIDIR <0.2 2021    IBILI see below 2021    LABALBU 3.0 2022       Micro:   Wound culture  - heavy growth mixed skin anne, mod MRSA  Staph aureus mrsa   Antibiotic Interpretation Microscan    ceFAZolin Resistant 8 mcg/mL   clindamycin Sensitive <=0.5 mcg/mL   DAPTOmycin Sensitive 1 mcg/mL   erythromycin Resistant >4 mcg/mL   linezolid Sensitive 4 mcg/mL   oxacillin Resistant >2 mcg/mL   tetracycline Sensitive <=4 mcg/mL   trimethoprim-sulfamethoxazole Sensitive <=0.5/9.5 mcg/mL   vancomycin Sensitive 2 mcg/mL        Imagin/25 X-ray Tibia, Fibula right - No acute findings.      05/25 X-ray left foot:  Impression   Acute osteomyelitis in the 5th metatarsal bone.      05/25 X-ray right foot - unremarkable.      05/25 Venous Doppler BL extremity  Nondiagnostic right ROSARIO due to vessel noncompressibility.    Normal left ROSARIO at rest.    Less than 50% stensoes of CFA and popliteal artery however this study is not    diagnostic due to imaging difficulties associated with leg edema. Clinical    correlation recommended.      05/26 MRI left foot  Impression   1.  Acute osteomyelitis in the 5th metatarsal bone and proximal phalanx with septic arthritis at the MTP joint. 2.  Regional cellulitis and myositis. 3.  No evidence of abscess.         Scheduled Meds:   daptomycin (CUBICIN) IVPB  6 mg/kg (Ideal) IntraVENous Q24H    miconazole   Topical BID    [Held by provider] aspirin  81 mg Oral Daily    atorvastatin  80 mg Oral Nightly    gabapentin  600 mg Oral BID    levothyroxine  75 mcg Oral QAM AC    pantoprazole  40 mg Oral QAM AC    tamsulosin  0.8 mg Oral Daily    traZODone  25 mg Oral Nightly    vitamin B-12  1,000 mcg Oral Daily    sodium chloride flush  5-40 mL IntraVENous 2 times per day    sodium hypochlorite   Irrigation Daily       Continuous Infusions:   sodium chloride 10 mL (05/27/22 1125)       PRN Meds:  sodium chloride flush, sodium chloride, ondansetron **OR** ondansetron, polyethylene glycol, acetaminophen **OR** acetaminophen, traMADol      Assessment:     [de-identified] yo man with hx AF, JARAD, CHF, neurogenic bladder with chronic schneider  Hx UTI, hx LE venous wounds, hx mult admission - last 4/22-25  Non-ambulatory, lives in [facility]     Presents with worse perigenital erythema, worse appearance LE wounds  Admit 5/25 - afeb, WBC 9.3, CRP 16.3  Seen by Podiatry  X-ray R foot 5th MT OM, MRI with R 5th prox phalanx / MTP joint / distal MT infection  Rx Vancomycin / Cefepime     Cult mod MRSA, heavy mixed skin anne     IMP/  LE wounds, not infected  R 5th MTP septic arthritis, R distal MT / prox phalanx OM  S/p R 5th partial ray resection, Stravix graft placed  5/29, 'felt to be definitive'      Plan:     Cont Daptomycin    Discharge on Linezolid 600 mg po bid x 2 weeks   Postop care and f/u per Podiatry     Medical Decision Making:   The following items were considered in medical decision making:  Discussion of patient care with other providers  Reviewed clinical lab tests  Reviewed radiology tests  Reviewed other diagnostic tests/interventions  Independent review of radiologic images - reviewed with Radiologist 5/27  Microbiology cultures and other micro tests reviewed      Discussed with pt, Attending - Dr Juan Chase MD

## 2022-05-31 NOTE — PROGRESS NOTES
Hospitalist Progress Note      PCP: INES BLANDON [address]    Date of Admission: 5/25/2022    Chief Complaint:     Chief Complaint   Patient presents with    Other     Sent to ED from [address] for redness and swelling to groin that pt reports he has had for a long time. Nursing home never called with report     Subjective:  POD#2 L partial 5th ray resection  Wake alert oriented x4. Report mild pain in both feet. Podiatry ok for d/c PT sign-off, patient dep with all ADLs in bed. ID plan for Linezolid 600mg po BID x 2 weeks.     PFHS: reviewed as documented 5/25/2022, no changes unless noted above    Medications:  Reviewed    Infusion Medications    sodium chloride 10 mL (05/27/22 1125)     Scheduled Medications    daptomycin (CUBICIN) IVPB  6 mg/kg (Ideal) IntraVENous Q24H    miconazole   Topical BID    [Held by provider] aspirin  81 mg Oral Daily    atorvastatin  80 mg Oral Nightly    gabapentin  600 mg Oral BID    levothyroxine  75 mcg Oral QAM AC    pantoprazole  40 mg Oral QAM AC    tamsulosin  0.8 mg Oral Daily    traZODone  25 mg Oral Nightly    vitamin B-12  1,000 mcg Oral Daily    sodium chloride flush  5-40 mL IntraVENous 2 times per day    sodium hypochlorite   Irrigation Daily     PRN Meds: sodium chloride flush, sodium chloride, ondansetron **OR** ondansetron, polyethylene glycol, acetaminophen **OR** acetaminophen, traMADol      Intake/Output Summary (Last 24 hours) at 5/31/2022 1727  Last data filed at 5/31/2022 1424  Gross per 24 hour   Intake 240 ml   Output 3175 ml   Net -2935 ml       Physical Exam    /64   Pulse 53   Temp 99 °F (37.2 °C) (Oral)   Resp 16   Ht 5' 11\" (1.803 m)   Wt 218 lb 14.7 oz (99.3 kg)   SpO2 92%   BMI 30.53 kg/m²     General appearance:  No acute distress, appears stated age  Eyes: Pupils equal, round, reactive to light, conjunctiva/corneas clear  Ears/Nose/Mouth/Throat: No external lesions or scars, hearing intact to voice  Neck: Trachea midline, no masses noted, no thyromegaly  Respiratory:  Non-labored breathing, clear to auscultation bilaterally  Cardiovascular: Regular rate and rhythm, no murmurs, gallops, or rubs  Abdomen: soft, non-tender, non-distended  Musculoskeletal: Warm, well perfused, no cyanosis or edema  Skin: Left foot bandaged, photos and ID and podiatry note  Psychiatric: A&Ox4, good insight and judgment    Labs:   Recent Labs     05/29/22  0629   WBC 7.0   HGB 10.4*   HCT 31.8*        Recent Labs     05/29/22  0629      K 4.7      CO2 24   BUN 34*   CREATININE 0.7*   CALCIUM 8.5     No results for input(s): AST, ALT, BILIDIR, BILITOT, ALKPHOS in the last 72 hours. Recent Labs     05/29/22  0629   INR 1.22*     Recent Labs     05/28/22  2211   CKTOTAL 41       Urinalysis:      Lab Results   Component Value Date    NITRU POSITIVE 04/22/2022    WBCUA 21-50 04/22/2022    BACTERIA 4+ 04/22/2022    RBCUA 3-4 04/22/2022    BLOODU Negative 04/22/2022    SPECGRAV <=1.005 04/22/2022    GLUCOSEU Negative 04/22/2022       Radiology:  XR FOOT LEFT (MIN 3 VIEWS)   Final Result      Postoperative changes of fifth transmetatarsal amputation. Expected postoperative soft tissue gas and swelling. Extensive osteopenia. XR CHEST (2 VW)   Final Result      Unchanged appearance of small loculated left pleural effusion or empyema. LASER SKIN PERFUSION PRESSURE STUDY   Final Result      MRI FOOT LEFT W WO CONTRAST   Final Result      1. Acute osteomyelitis in the 5th metatarsal bone and proximal phalanx with septic arthritis at the MTP joint. 2.  Regional cellulitis and myositis. 3.  No evidence of abscess. VL DUP LOWER EXTREMITY ARTERIES LEFT   Final Result      XR FOOT RIGHT (MIN 3 VIEWS)   Final Result      Limited radiographs without definite acute osseous abnormality. Consider repeat and provide information regarding specific area of concern.       XR TIBIA FIBULA LEFT (2 VIEWS)   Final Result      No acute osseous findings. XR FOOT LEFT (MIN 3 VIEWS)   Final Result      Acute osteomyelitis in the 5th metatarsal bone. XR TIBIA FIBULA RIGHT (2 VIEWS)   Final Result      No acute fracture seen. Assessment/Plan:    Active Hospital Problems    Diagnosis Date Noted    Weight loss counseling, encounter for [Z71.3]      Priority: Medium    Elevated sed rate [R70.0]      Priority: Medium    Elevated C-reactive protein (CRP) [R79.82]      Priority: Medium    Infection requiring contact isolation precautions [B99.9]      Priority: Medium    Class 1 obesity due to excess calories with body mass index (BMI) of 30.0 to 30.9 in adult [E66.09, Z68.30]      Priority: Medium    Chronic multifocal osteomyelitis of right foot ([address] Nasrin Vazquez 05/27/2022     Priority: Medium    MRSA infection [A49.02] 05/27/2022     Priority: Medium    Septic arthritis of interphalangeal joint of toe of right foot ([address] [M00.9] 05/27/2022     Priority: Medium    Osteomyelitis ([address] [M86.9] 05/25/2022     Priority: Medium       Plan:    #Acute osteomyelitis of the left fifth toe s/p  L partial 5th ray resection (5/29)  #Chronic skin wounds  #Fungal infection/tinea curious  #Chronic indwelling Iglesias catheter  #Recent UTI with MDRO  #Recent C. difficile infection  #Chronic diastolic heart failure  #Bedridden  #History of paroxysmal A. fib  #Hypertension  #Hyperlipidemia  #GERD  #CAD    Appreciate ID and podiatry consultation  F/U OR pathology/cx and ID recommendations  Analgesia as needed  Continue Dapto IV, plan for Linezolid 600mg po BID x 2 weeks per ID. Follow cultures    # Remainder of medical conditions  -home management except as above    DVT Prophylaxis: Lovenox  Diet: ADULT DIET; Regular; 4 carb choices (60 gm/meal);  Low Fat/Low Chol/High Fiber/2 gm Na  Code Status: Full Code    PT/OT Eval Status: Ongoing    Dispo: Likely tomorrow, back to [address]    Discussed with patient and nursing.         Duane Shirley MD

## 2022-05-31 NOTE — PROGRESS NOTES
Podiatric Surgery Daily Progress Note      Admit Date: 5/25/2022      Code:Full Code    Patient seen and examined, labs and records reviewed    Subjective:     Patient seen at bedside this AM.  Patient is postop day 2 from a left foot partial fifth ray resection. Patient does complain of some mild pain coming from his right foot/leg but denies any pain from operative leg. Patient denies any overnight acute event. Patient denies f/c/n/v/sob/cp. Review of Systems: A 12 point review of symptoms is unremarkable with the exception of the chief complaint. Patient specifically denies nausea, fever, vomiting, chills, shortness of breath, chest pain, abdominal pain, constipation or difficulty urinating. Objective     /64   Pulse 53   Temp 99 °F (37.2 °C) (Oral)   Resp 16   Ht 5' 11\" (1.803 m)   Wt 218 lb 14.7 oz (99.3 kg)   SpO2 92%   BMI 30.53 kg/m²      I/O:    Intake/Output Summary (Last 24 hours) at 5/31/2022 0741  Last data filed at 5/31/2022 0618  Gross per 24 hour   Intake 720 ml   Output 3425 ml   Net -2705 ml              Wt Readings from Last 3 Encounters:   05/25/22 218 lb 14.7 oz (99.3 kg)   04/22/22 232 lb 12.8 oz (105.6 kg)   04/11/22 220 lb (99.8 kg)       LABS:    Recent Labs     05/29/22  0629   WBC 7.0   HGB 10.4*   HCT 31.8*           Recent Labs     05/29/22  0629      K 4.7      CO2 24   BUN 34*   CREATININE 0.7*        Recent Labs     05/29/22  0629   INR 1.22*       LOWER EXTREMITY EXAMINATION    Dressing to bilateral LE intact. No strikethrough noted to the external dressing. Mild serosanguineous drainage noted to the internal layers of the dressing noted to right lower extremity. VASCULAR: DP and PT pulses nonpalpable 0/4. Upon handheld doppler examination, DP and PT pulses were noted to have biphasic signals. CFT is brisk to the digits of the foot.  Skin temperature is warm to wark from proximal to distal. Non-pitting edema noted bilateral lower extremities, right greater than left. No pain with calf compression b/l.     NEUROLOGIC: Gross and epicritic sensation is intact. Protective sensation is diminished at all pedal sites.     DERMATOLOGIC: Diffuse dermatologic changes noted to right LE with erythema present from just proximal to the ankle joint to just distal to the knee joint which does not resolve with extremity elevation noted to be decreased during admission.      Right lower extremity:  Multiple full-thickness ulcerations noted to the anterior aspect and anterior lateral aspect of the right leg. Wound base is a combination of granular, and fibrotic tissue. Periwound erythema noted. No purulent drainage present. No malodor noted. No fluctuance or crepitus noted. Superficial ulceration noted to the dorsal medial aspect of the right fifth digit. No acute signs of infection noted. Full-thickness ulceration noted at the fifth metatarsal head. Wound base is a combination of granular and necrotic tissue. Wound measures approximately 2 cm x 2 cm x 0.1 cm. No purulent drainage expressed. No tracking, tunneling, crepitus, fluctuance, or malodor noted. Full-thickness ulceration noted just proximal to the above mentioned wound on the fifth metatarsal.  Wound base is granular in nature. The wound measures approximately 2 cm x 1.5 cm x 0.1 cm. No purulent drainage expressed. No tracking, tunneling, crepitus, fluctuance, or malodor noted. Deep tissue injury noted to the posterior lateral aspect of the right lower extremity. No fluctuance or crepitus noted. No purulent drainage expressed. No malodor noted. Patient provided verbal consent for patient obtained today:              Left lower extremity dressing was left clean, dry, and intact.     MUSCULOSKELETAL: Muscle strength is 4/5 for all pedal groups tested. Mild pain with palpation of the periwound areas to bilateral lower extremities.  Ankle joint ROM is decreased in dorsiflexion with the knee extended. No obvious biomechanical abnormalities. IMAGING:  XR Tib/Fib Right (5/25/2022)  Narrative   RIGHT TIBIA FIBULA  4 views       HISTORY: Pain       COMPARISON: none       FINDINGS:       No evidence of acute fracture or traumatic bony abnormality is seen.       Osteopenia is noted.           Impression       No acute fracture seen.      XR Left Foot (5/25/2022)  Narrative   EXAM: LEFT FOOT 3 VIEWS       INDICATION: Left 5th toe wound.       COMPARISON: 1/14/20       FINDINGS:       Wound or ulceration identified over the 5th MTP region. There is loss of cortical margin laterally suggesting erosive changes in the neck and head of the metatarsal bone. Bones are otherwise severely osteopenic. No fracture identified. Extensive vascular    calcifications.           Impression       Acute osteomyelitis in the 5th metatarsal bone.          XR foot right (5/25/2022)  Narrative   EXAM: RIGHT FOOT 3 VIEWS       INDICATION: Right foot wound       COMPARISON: None       FINDINGS:       Suboptimal positioning. Artifact from patient motion on the oblique view. Findings significantly limited exam in conjunction with the severe osteopenia. No definite erosion or fracture seen. There is diffuse soft tissue swelling. No definite soft tissue    gas.           Impression       Limited radiographs without definite acute osseous abnormality. Consider repeat and provide information regarding specific area of concern. XR Tib/Fib Left (5/25/2022)  Narrative   EXAM: LEFT TIBIA-FIBULA 2 VIEWS       INDICATION: Left leg wound       COMPARISON: None       FINDINGS:       Bones are diffusely osteopenic. No acute fracture or malalignment. No erosions seen. Diffuse vascular calcifications. Diffuse soft tissue swelling.           Impression       No acute osseous findings.      Arterial Duplex Left Lower Extremity (5/26/2022)  Arterial Duplex Scan: Grayscale and color imaging of the extremity arteries,   including Doppler spectral waveform analysis.       Impressions   Right Impression   The ankle/brachial index is non-compressible (, PT >260mmHg). Left Impression   The ankle/brachial index is 1.13 (, PT 158mmHg). Multiphasic flow in the common femoral artery indicates no significant   aorto-iliac inflow disease. Atherosclerotic plaque in the common femoral artery and popliteal artery is   consistent with <50% stenosis. The superficial femoral artery appears to have multiphasic Doppler signals,   but is difficult to see in b-mode due to edema. The tibial trunk is not visualized, possibly due to edema, and the peroneal   artery is poorly seen, and is possibly patent. No previous left leg arterial duplex studies for comparison.       Conclusions        Summary        Nondiagnostic right ROSARIO due to vessel noncompressibility.    Normal left ROSARIO at rest.        Less than 50% stensoes of CFA and popliteal artery however this study is not    diagnostic due to imaging difficulties associated with leg edema. Clinical    correlation recommended. MRI Left Foot (5/27/2022)  Narrative   MRI FOOT LEFT W WO CONTRAST       Indication: Osteomyelitis of 5th metatarsal, left foot       Comparison: Radiograph 5/25/22       Technique: Multiplanar multisequence MR imaging of the left foot was performed without intravenous contrast.        Findings:       Wound or ulceration laterally over the forefoot 5th MTP joint region. 5th metatarsal head and neck erosions, confluent marrow placement and postcontrast enhancement extending as far proximally as the mid shaft consistent with acute osteomyelitis. Enhancement/synovitis in the MTP joint and diffuse marrow replacement in the proximal phalanx, consistent with septic arthritis and osteomyelitis, respectively.       Cellulitis and myositis in the soft tissues around the infected bone. No evidence of abscess.  There is more diffuse nonspecific edema signal extending over the dorsum of the foot in the subcutaneous space. No significant tenosynovitis. Extensive diffuse    atrophy throughout the intrinsic foot muscles consistent with chronic denervation.           Impression       1.  Acute osteomyelitis in the 5th metatarsal bone and proximal phalanx with septic arthritis at the MTP joint. 2.  Regional cellulitis and myositis. 3.  No evidence of abscess. Laser Skin Perfusion 5/27/2022  Tech Comments   Right   Pulse volume recording at the level of the foot reveals waveforms within   normal limits. Laser skin perfusion pressure study at the transmetatarsal level ([place]) is   148 mmHg; wound healing likely. This is considered to have a good probability   for healing. Laser skin perfusion pressure study at the transmetatarsal level (Medial   plantar) is 112 mmHg; wound healing likely. This is considered to have a good   probability for healing. Left   Pulse volume recording at the level of the foot reveals waveforms within   normal limits. Laser skin perfusion pressure study at the transmetatarsal level ([place]) is   100 mmHg; wound healing likely. This is considered to have a good probability   for healing. Laser skin perfusion pressure study at the transmetatarsal level (Medial   [place]antar) is 82 mmHg; wound healing likely. This is considered to have a good   probability for healing. There are no previous studies for comparison.        ASSESSMENT/PLAN  -s/p left foot partial fifth ray resection (DOS 5/29/2022)  -Osteomyelitis, left fifth metatarsal  -Cellulitis, right lower extremity  -Serous bulla, left dorsal fourth interspace  -Full-thickness ulceration, distal lateral left foot-Bueno stage III  -Full-thickness ulceration, anterior left leg-Bueno stage II  -Full-thickness ulceration, right leg-Bueno stage II  -Full-thickness ulcerations, distal lateral right foot-Bueno stage II  -Superficial thickness ulceration, right fifth digit-Bueno stage I  -Deep tissue injury, bilateral heels    -Patient seen and examined at bedside this AM  -Hypertensive but other VSS, No CBC this AM  -ESR 48 and CRP 16.3  -lactic acid 0.8  -prealbumin 12.4; dietitian is on board  -Imagings reviewed, impression as noted above  -Wound culture: MRSA  -Surgical pathology pending  -Continue IV antibiotics per infectious disease  -Right lower extremity dressed with Adaptic, dakins soaked gauze, DSD, and Ace  -Left lower extremity was left clean, dry, and intact.  -Prevalon boots re-applied. Patient is to wear at all times while in bed to prevent further deep tissue injury.  -Patient is nonweightbearing at baseline    DISPO: Patient is s/p left foot partial 5th ray resection with graft application. Will follow up on intra-op path. Procedure was felt to be definitive. PT/OT evaluation pending. Patient is stable for discharge from a podiatric standpoint. Patient is to follow up with Dr. Lacie Xavier within 1 week of discharge. Patient assessment and plan was discussed with  Dr. Rodolfo Peng DPM.    Meg Morales DPM  Podiatric Resident, PGY-1  Pager #: [phone] or Perfect Serve    Patient was seen and evaluated at bedside. Agree with residents assessment and treatment plan.   Rodolfo Peng DPM

## 2022-06-01 PROCEDURE — 1200000000 HC SEMI PRIVATE

## 2022-06-01 PROCEDURE — 6370000000 HC RX 637 (ALT 250 FOR IP): Performed by: STUDENT IN AN ORGANIZED HEALTH CARE EDUCATION/TRAINING PROGRAM

## 2022-06-01 PROCEDURE — 99232 SBSQ HOSP IP/OBS MODERATE 35: CPT | Performed by: INTERNAL MEDICINE

## 2022-06-01 PROCEDURE — 6360000002 HC RX W HCPCS: Performed by: STUDENT IN AN ORGANIZED HEALTH CARE EDUCATION/TRAINING PROGRAM

## 2022-06-01 PROCEDURE — 2580000003 HC RX 258: Performed by: STUDENT IN AN ORGANIZED HEALTH CARE EDUCATION/TRAINING PROGRAM

## 2022-06-01 RX ORDER — LINEZOLID 600 MG/1
600 TABLET, FILM COATED ORAL 2 TIMES DAILY
Qty: 28 TABLET | Refills: 0 | Status: SHIPPED | OUTPATIENT
Start: 2022-06-01 | End: 2022-06-15

## 2022-06-01 RX ORDER — LINEZOLID 600 MG/1
600 TABLET, FILM COATED ORAL 2 TIMES DAILY
Qty: 28 TABLET | Refills: 0 | Status: SHIPPED | OUTPATIENT
Start: 2022-06-01 | End: 2022-06-01 | Stop reason: SDUPTHER

## 2022-06-01 RX ADMIN — TRAMADOL HYDROCHLORIDE 25 MG: 50 TABLET, COATED ORAL at 07:34

## 2022-06-01 RX ADMIN — TRAZODONE HYDROCHLORIDE 25 MG: 50 TABLET, FILM COATED ORAL at 20:50

## 2022-06-01 RX ADMIN — TAMSULOSIN HYDROCHLORIDE 0.8 MG: 0.4 CAPSULE ORAL at 07:34

## 2022-06-01 RX ADMIN — TRAMADOL HYDROCHLORIDE 25 MG: 50 TABLET, COATED ORAL at 23:37

## 2022-06-01 RX ADMIN — MICONAZOLE NITRATE: 2 POWDER TOPICAL at 20:52

## 2022-06-01 RX ADMIN — CYANOCOBALAMIN TAB 1000 MCG 1000 MCG: 1000 TAB at 07:34

## 2022-06-01 RX ADMIN — DAKIN'S SOLUTION 0.125% (QUARTER STRENGTH): 0.12 SOLUTION at 11:36

## 2022-06-01 RX ADMIN — PANTOPRAZOLE SODIUM 40 MG: 40 TABLET, DELAYED RELEASE ORAL at 05:57

## 2022-06-01 RX ADMIN — GABAPENTIN 600 MG: 600 TABLET, FILM COATED ORAL at 07:34

## 2022-06-01 RX ADMIN — MICONAZOLE NITRATE: 2 POWDER TOPICAL at 07:36

## 2022-06-01 RX ADMIN — ATORVASTATIN CALCIUM 80 MG: 80 TABLET, FILM COATED ORAL at 20:50

## 2022-06-01 RX ADMIN — SODIUM CHLORIDE, PRESERVATIVE FREE 10 ML: 5 INJECTION INTRAVENOUS at 07:36

## 2022-06-01 RX ADMIN — SODIUM CHLORIDE, PRESERVATIVE FREE 10 ML: 5 INJECTION INTRAVENOUS at 20:51

## 2022-06-01 RX ADMIN — GABAPENTIN 600 MG: 600 TABLET, FILM COATED ORAL at 20:50

## 2022-06-01 RX ADMIN — LEVOTHYROXINE SODIUM 75 MCG: 0.07 TABLET ORAL at 05:57

## 2022-06-01 RX ADMIN — DAPTOMYCIN 450 MG: 500 INJECTION, POWDER, LYOPHILIZED, FOR SOLUTION INTRAVENOUS at 18:58

## 2022-06-01 ASSESSMENT — PAIN DESCRIPTION - ONSET: ONSET: GRADUAL

## 2022-06-01 ASSESSMENT — PAIN DESCRIPTION - DESCRIPTORS
DESCRIPTORS: ACHING
DESCRIPTORS: ACHING

## 2022-06-01 ASSESSMENT — PAIN DESCRIPTION - ORIENTATION
ORIENTATION: RIGHT
ORIENTATION: RIGHT;LEFT

## 2022-06-01 ASSESSMENT — PAIN SCALES - GENERAL
PAINLEVEL_OUTOF10: 3
PAINLEVEL_OUTOF10: 7
PAINLEVEL_OUTOF10: 0
PAINLEVEL_OUTOF10: 4

## 2022-06-01 ASSESSMENT — PAIN DESCRIPTION - PAIN TYPE
TYPE: CHRONIC PAIN
TYPE: ACUTE PAIN

## 2022-06-01 ASSESSMENT — PAIN DESCRIPTION - LOCATION
LOCATION: ELBOW
LOCATION: FOOT

## 2022-06-01 ASSESSMENT — PAIN DESCRIPTION - FREQUENCY
FREQUENCY: INTERMITTENT
FREQUENCY: INTERMITTENT

## 2022-06-01 NOTE — CARE COORDINATION
Case Management Assessment            Discharge Note                    Date / Time of Note: 6/1/2022 11:52 AM                  Discharge Note Completed by: ARIC Palacio LSW    Patient Name: Livia Juares   YOB: 1940  Diagnosis: Osteomyelitis [facility]) [M86.9]  Osteomyelitis of left foot, unspecified type [facility]) [M86.9]   Date / Time: 5/25/2022  4:34 PM    [facility] patient: No    Hospitalization in the last 30 days: Yes    Advance Directives:  Code Status: Full Code  [place] DNR form completed and on chart: Yes    Financial:  Payor: Abi Hooker / Plan: Virgil Sweeney / Product Type: *No Product type* /      Pharmacy:    [address] [place] [address] [phone] - F [phone]  [address] [facility] [address]  Phone: [phone] Fax: [phone]      Assistance purchasing medications?: Potential Assistance Purchasing Medications: No  Assistance provided by Case Management: None at this time    Does patient want to participate in local refill/ meds to beds program?: Yes    Meds To Beds General Rules:  1. Can ONLY be done Monday- Friday between 8:30am-5pm  2. Prescription(s) must be in pharmacy by 3pm to be filled same day  3. Copy of patient's insurance/ prescription drug card and patient face sheet must be sent along with the prescription(s)  4. Cost of Rx cannot be added to hospital bill. If financial assistance is needed, please contact unit  or ;  or  CANNOT provide pharmacy voucher for patients co-pays  5.  Patients can then  the prescription on their way out of the hospital at discharge, or pharmacy can deliver to the bedside if staff is available. (payment due at time of pick-up or delivery - cash, check, or card accepted)     Able to afford home medications/ co-pay costs: Yes    ADLS:  Current PT AM-PAC Score:   /24  Current OT AM-PAC Score:   /24      DISCHARGE Disposition: [address]: [address] Phone: [phone] Fax: [phone]    LOC at discharge: Not Applicable  AZRA Completed: Yes    Notification completed in HENS/PAS?:  Not Applicable    IMM Completed:   Not Indicated    Transportation:  Transportation PLAN for discharge: EMS transportation   Mode of Transport: Ambulance stretcher - BLS  Reason for medical transport: Bed confined: Meets the following criteria 1) unable to get out of bed without assistance or ambulate, 2) unable to safely sit up in a wheelchair, 3) unable to maintain erect seating position in a chair for time needed for transport  Name of [address] [place] [address], [address]: [facility]   Phone: [phone]  Time of Transport: 4-4:30PM    Transport form completed: Yes      Home Oxygen and Respiratory Equipment:  Oxygen needed at discharge?: No    Dialysis:  Dialysis patient: No      Referrals made at [facility] for outpatient continued care:  Not Applicable    Additional CM Notes:  Pt will DC back to [address] [facility] - assisted living today. CM notified the AL RN and [place] RN of transport time, and faxed AVS.  No other needs at this time. Report - [phone]        The Plan for Transition of Care is related to the following treatment goals of Osteomyelitis [facility]) [M86.9]  O[facility]omyelitis of left foot, unspecified type [facility] [M86.9]    The Patient and/or patient representative Jonn Fox and his family were provided with a choice of provider and agrees with the discharge plan Yes    Freedom of choice list was provided with basic dialogue that supports the patient's individualized plan of care/goals and shares the quality data associated with the providers.  Yes    Care Transitions patient: No    ARIC Reyes, [facility]  Case Management Department  Ph: [phone]  Fax: [phone]

## 2022-06-01 NOTE — PROGRESS NOTES
Pt noted to be bradycardic on tele monitor at 39, upon entry to room patient was sleeping. Pt woke to verbal stimuli, HR currently 48, /78.  Will notify MD.

## 2022-06-01 NOTE — PROGRESS NOTES
Hospitalist Progress Note      PCP: INES BLANDON [address]    Date of Admission: 5/25/2022    Chief Complaint:     Chief Complaint   Patient presents with    Other     Sent to ED from [address] for redness and swelling to groin that pt reports he has had for a long time. Nursing home never called with report     Subjective:  POD#3 L partial 5th ray resection  Wake alert oriented x4. Eating lunch in the bed. Foot pain under control. Discharged with linezolid 600mg po BID x 2 weeks. A back to his facility. Surgical pathology: (5/29/22)  Bone and tissue, left fifth metatarsal, foot:   Bone with focal acute osteomyelitis intermixed with subacute ( partially   treated ) osteomyelitis and degenerative changes. Portion of bone submitted as margin with extravasated blood and no   evidence of acute osteomyelitis. No evidence of malignancy.      PFHS: reviewed as documented 5/25/2022, no changes unless noted above    Medications:  Reviewed    Infusion Medications    sodium chloride 10 mL (05/27/22 1125)     Scheduled Medications    daptomycin (CUBICIN) IVPB  6 mg/kg (Ideal) IntraVENous Q24H    miconazole   Topical BID    [Held by provider] aspirin  81 mg Oral Daily    atorvastatin  80 mg Oral Nightly    gabapentin  600 mg Oral BID    levothyroxine  75 mcg Oral QAM AC    pantoprazole  40 mg Oral QAM AC    tamsulosin  0.8 mg Oral Daily    traZODone  25 mg Oral Nightly    vitamin B-12  1,000 mcg Oral Daily    sodium chloride flush  5-40 mL IntraVENous 2 times per day    sodium hypochlorite   Irrigation Daily     PRN Meds: sodium chloride flush, sodium chloride, ondansetron **OR** ondansetron, polyethylene glycol, acetaminophen **OR** acetaminophen, traMADol      Intake/Output Summary (Last 24 hours) at 6/1/2022 1659  Last data filed at 6/1/2022 1430  Gross per 24 hour   Intake 480 ml   Output 2075 ml   Net -1595 ml       Physical Exam    BP (!) 161/80   Pulse 65   Temp 98.7 °F (37.1 °C) (Oral)   Resp 14    5' 11\" (1.803 m)   Wt 218 lb 14.7 oz (99.3 kg)   SpO2 98%   BMI 30.53 kg/m²     General appearance:  No acute distress, appears stated age  Eyes: Pupils equal, round, reactive to light, conjunctiva/corneas clear  Ears/Nose/Mouth/Throat: No external lesions or scars, hearing intact to voice  Neck: Trachea midline, no masses noted, no thyromegaly  Respiratory:  Non-labored breathing, clear to auscultation bilaterally  Cardiovascular: Regular rate and rhythm, no murmurs, gallops, or rubs  Abdomen: soft, non-tender, non-distended  Musculoskeletal: Wrapped in bandages  Skin: Left foot bandaged, photos and ID and podiatry note  Psychiatric: A&Ox4, good insight and judgment    Labs:   No results for input(s): WBC, HGB, HCT, PLT in the last 72 hours. No results for input(s): NA, K, CL, CO2, BUN, CREATININE, CALCIUM, PHOS in the last 72 hours. Invalid input(s): MAGNES  No results for input(s): AST, ALT, BILIDIR, BILITOT, ALKPHOS in the last 72 hours. No results for input(s): INR in the last 72 hours. No results for input(s): Sydnie Pace in the last 72 hours. Urinalysis:      Lab Results   Component Value Date    NITRU POSITIVE 04/22/2022    WBCUA 21-50 04/22/2022    BACTERIA 4+ 04/22/2022    RBCUA 3-4 04/22/2022    BLOODU Negative 04/22/2022    SPECGRAV <=1.005 04/22/2022    GLUCOSEU Negative 04/22/2022       Radiology:  XR FOOT LEFT (MIN 3 VIEWS)   Final Result      Postoperative changes of fifth transmetatarsal amputation. Expected postoperative soft tissue gas and swelling. Extensive osteopenia. XR CHEST (2 VW)   Final Result      Unchanged appearance of small loculated left pleural effusion or empyema. LASER SKIN PERFUSION PRESSURE STUDY   Final Result      MRI FOOT LEFT W WO CONTRAST   Final Result      1. Acute osteomyelitis in the 5th metatarsal bone and proximal phalanx with septic arthritis at the MTP joint. 2.  Regional cellulitis and myositis.    3.  No evidence of abscess. VL DUP LOWER EXTREMITY ARTERIES LEFT   Final Result      XR FOOT RIGHT (MIN 3 VIEWS)   Final Result      Limited radiographs without definite acute osseous abnormality. Consider repeat and provide information regarding specific area of concern. XR TIBIA FIBULA LEFT (2 VIEWS)   Final Result      No acute osseous findings. XR FOOT LEFT (MIN 3 VIEWS)   Final Result      Acute osteomyelitis in the 5th metatarsal bone. XR TIBIA FIBULA RIGHT (2 VIEWS)   Final Result      No acute fracture seen. Assessment/Plan:    Active Hospital Problems    Diagnosis Date Noted    Weight loss counseling, encounter for [Z71.3]      Priority: Medium    Elevated sed rate [R70.0]      Priority: Medium    Elevated C-reactive protein (CRP) [R79.82]      Priority: Medium    Infection requiring contact isolation precautions [B99.9]      Priority: Medium    Class 1 obesity due to excess calories with body mass index (BMI) of 30.0 to 30.9 in adult [E66.09, Z68.30]      Priority: Medium    Chronic multifocal osteomyelitis of right foot ([address] [facility] 05/27/2022     Priority: Medium    MRSA infection [A49.02] 05/27/2022     Priority: Medium    Septic arthritis of interphalangeal joint of toe of right foot ([address] [M00.9] 05/27/2022     Priority: Medium    Osteomyelitis ([address] [M86.9] 05/25/2022     Priority: Medium       Plan:    #Acute osteomyelitis of the left fifth toe s/p  L partial 5th ray resection (5/29)  #Chronic skin wounds  #Fungal infection/tinea curious  #Chronic indwelling Iglesias catheter  #Recent UTI with MDRO  #Recent C. difficile infection  #Chronic diastolic heart failure  #Bedridden  #History of paroxysmal A. fib  #Hypertension  #Hyperlipidemia  #GERD  #CAD    Appreciate ID and podiatry consultation  F/U OR pathology/cx and ID recommendations  Analgesia as needed  Discharge today with Linezolid 600mg po BID x 2 weeks per ID.   Follow cultures    # Remainder of medical conditions  -home management except as above    DVT Prophylaxis: Lovenox  Diet: ADULT DIET; Regular; 4 carb choices (60 gm/meal); Low Fat/Low Chol/High Fiber/2 gm Na  Code Status: Full Code    PT/OT Eval Status: Ongoing    Dispo: today back to [address]    Discussed with patient and nursing.         Xi Godfrey MD

## 2022-06-01 NOTE — CARE COORDINATION
BAYRON received call from [facility] who reported that they cannot accept pt back with a level 3 wound, and will need SNF before returning. She reported that she already coordinated with [facility] who can take pt. CM left  for liaison. Pt will need precert. [phone] staff are reviewing. BAYRON started precert.     HENS # [id number]        Electronically signed by ARIC Torres, CARLIN on 6/1/2022 at 3:05 PM

## 2022-06-01 NOTE — PROGRESS NOTES
Podiatric Surgery Daily Progress Note      Admit Date: 5/25/2022      Code:Full Code    Patient seen and examined, labs and records reviewed    Subjective:     Patient seen at bedside this AM.  Patient is postop day 3 from a left foot partial fifth ray resection. Patient does complain of some mild pain coming from his right foot/leg but denies any pain from operative leg. Patient denies any overnight acute event. Patient denies f/c/n/v/sob/cp. Review of Systems: A 12 point review of symptoms is unremarkable with the exception of the chief complaint. Patient specifically denies nausea, fever, vomiting, chills, shortness of breath, chest pain, abdominal pain, constipation or difficulty urinating. Objective     /77   Pulse 63   Temp 98.3 °F (36.8 °C) (Oral)   Resp 17   Ht 5' 11\" (1.803 m)   Wt 218 lb 14.7 oz (99.3 kg)   SpO2 94%   BMI 30.53 kg/m²      I/O:    Intake/Output Summary (Last 24 hours) at 6/1/2022 0541  Last data filed at 5/31/2022 2154  Gross per 24 hour   Intake 960 ml   Output 2000 ml   Net -1040 ml              Wt Readings from Last 3 Encounters:   05/25/22 218 lb 14.7 oz (99.3 kg)   04/22/22 232 lb 12.8 oz (105.6 kg)   04/11/22 220 lb (99.8 kg)       LABS:    Recent Labs     05/29/22  0629   WBC 7.0   HGB 10.4*   HCT 31.8*           Recent Labs     05/29/22  0629      K 4.7      CO2 24   BUN 34*   CREATININE 0.7*        Recent Labs     05/29/22  0629   INR 1.22*       LOWER EXTREMITY EXAMINATION    Dressing to bilateral LE intact. No strikethrough noted to the external dressing. Mild serosanguineous drainage noted to the internal layers of the dressing noted to right lower extremity. VASCULAR: DP and PT pulses nonpalpable 0/4. Upon handheld doppler examination, DP and PT pulses were noted to have biphasic signals. CFT is brisk to the digits of the foot.  Skin temperature is warm to wark from proximal to distal. Non-pitting edema noted bilateral lower extremities, right greater than left. No pain with calf compression b/l.     NEUROLOGIC: Gross and epicritic sensation is intact. Protective sensation is diminished at all pedal sites.     DERMATOLOGIC: Diffuse dermatologic changes noted to right LE with erythema present from just proximal to the ankle joint to just distal to the knee joint which does not resolve with extremity elevation noted to be decreased during admission.      Right lower extremity:  Multiple full-thickness ulcerations noted to the anterior aspect and anterior lateral aspect of the right leg. Wound beds are fibrogranular with some slight periwound erythema noted but no purulent drainage, crepitus, fluctuance, or malodor noted. Superficial ulceration noted to the dorsal medial aspect of the right fifth digit. No acute signs of infection noted. Full-thickness ulceration noted at the fifth metatarsal head. Wound measures 2.0 cm x 2.0 cm x 0.1 cm and is a combination of granular and necrotic tissue. The wound does not tunnel or track and there appears to be no purulent drainage, crepitus, fluctuance, or active drainage noted. Full-thickness ulceration noted just proximal to the above mentioned wound on the fifth metatarsal. Wound measures 2.8 cm x 1.5 cm 0.1 cm with a relatively granular wound. The wound does not tunnel or track and there appears to be no purulence, crepitus, fluctuance, or active drainage. Deep tissue injury noted to the posterior lateral aspect of the right lower extremity. No fluctuance or crepitus noted. No purulent drainage expressed. No malodor noted. Left lower extremity:  Surgical incision site at the lateral aspect of the left foot appears to be healing well with sutures intact and skin edges well coapted with no signs of gapping or dehiscence noted. Graft noted to the distal aspect of incision site appears intact with no hematoma or seroma formation noted.      A full thickness ulceration noted to the anterior aspect of the leg. The wound bed is mostly granular but no tunneling or tracking noted. Furthermore the, there is no purulent drainage, fluctuance, crepitus, or active drainage noted. Furthermore, there are multiple deep tissue injuries noted to the posterior aspect of the heel with no acute infection noted.       MUSCULOSKELETAL: Muscle strength is 4/5 for all pedal groups tested. Mild pain with palpation of the periwound areas to bilateral lower extremities. Ankle joint ROM is decreased in dorsiflexion with the knee extended. No obvious biomechanical abnormalities. IMAGING:  XR Tib/Fib Right (5/25/2022)  Narrative   RIGHT TIBIA FIBULA  4 views       HISTORY: Pain       COMPARISON: none       FINDINGS:       No evidence of acute fracture or traumatic bony abnormality is seen.       Osteopenia is noted.           Impression       No acute fracture seen.      XR Left Foot (5/25/2022)  Narrative   EXAM: LEFT FOOT 3 VIEWS       INDICATION: Left 5th toe wound.       COMPARISON: 1/14/20       FINDINGS:       Wound or ulceration identified over the 5th MTP region. There is loss of cortical margin laterally suggesting erosive changes in the neck and head of the metatarsal bone. Bones are otherwise severely osteopenic. No fracture identified. Extensive vascular    calcifications.           Impression       Acute osteomyelitis in the 5th metatarsal bone.          XR foot right (5/25/2022)  Narrative   EXAM: RIGHT FOOT 3 VIEWS       INDICATION: Right foot wound       COMPARISON: None       FINDINGS:       Suboptimal positioning. Artifact from patient motion on the oblique view. Findings significantly limited exam in conjunction with the severe osteopenia. No definite erosion or fracture seen. There is diffuse soft tissue swelling. No definite soft tissue    gas.           Impression       Limited radiographs without definite acute osseous abnormality.  Consider repeat and provide information regarding specific area of concern. XR Tib/Fib Left (5/25/2022)  Narrative   EXAM: LEFT TIBIA-FIBULA 2 VIEWS       INDICATION: Left leg wound       COMPARISON: None       FINDINGS:       Bones are diffusely osteopenic. No acute fracture or malalignment. No erosions seen. Diffuse vascular calcifications. Diffuse soft tissue swelling.           Impression       No acute osseous findings. Arterial Duplex Left Lower Extremity (5/26/2022)  Arterial Duplex Scan: Grayscale and color imaging of the extremity arteries,   including Doppler spectral waveform analysis.       Impressions   Right Impression   The ankle/brachial index is non-compressible (, PT >260mmHg). Left Impression   The ankle/brachial index is 1.13 (, PT 158mmHg). Multiphasic flow in the common femoral artery indicates no significant   aorto-iliac inflow disease. Atherosclerotic plaque in the common femoral artery and popliteal artery is   consistent with <50% stenosis. The superficial femoral artery appears to have multiphasic Doppler signals,   but is difficult to see in b-mode due to edema. The tibial trunk is not visualized, possibly due to edema, and the peroneal   artery is poorly seen, and is possibly patent. No previous left leg arterial duplex studies for comparison.       Conclusions        Summary        Nondiagnostic right ROSARIO due to vessel noncompressibility.    Normal left ROSARIO at rest.        Less than 50% stensoes of CFA and popliteal artery however this study is not    diagnostic due to imaging difficulties associated with leg edema. Clinical    correlation recommended.      MRI Left Foot (5/27/2022)  Narrative   MRI FOOT LEFT W WO CONTRAST       Indication: Osteomyelitis of 5th metatarsal, left foot       Comparison: Radiograph 5/25/22       Technique: Multiplanar multisequence MR imaging of the left foot was performed without intravenous contrast.        Findings:       Wound or ulceration laterally over the forefoot 5th MTP joint region. 5th metatarsal head and neck erosions, confluent marrow placement and postcontrast enhancement extending as far proximally as the mid shaft consistent with acute osteomyelitis. Enhancement/synovitis in the MTP joint and diffuse marrow replacement in the proximal phalanx, consistent with septic arthritis and osteomyelitis, respectively.       Cellulitis and myositis in the soft tissues around the infected bone. No evidence of abscess. There is more diffuse nonspecific edema signal extending over the dorsum of the foot in the subcutaneous space. No significant tenosynovitis. Extensive diffuse    atrophy throughout the intrinsic foot muscles consistent with chronic denervation.           Impression       1.  Acute osteomyelitis in the 5th metatarsal bone and proximal phalanx with septic arthritis at the MTP joint. 2.  Regional cellulitis and myositis. 3.  No evidence of abscess. Laser Skin Perfusion 5/27/2022  Tech Comments   Right   Pulse volume recording at the level of the foot reveals waveforms within   normal limits. Laser skin perfusion pressure study at the transmetatarsal level ([place]) is   148 mmHg; wound healing likely. This is considered to have a good probability   for healing. Laser skin perfusion pressure study at the transmetatarsal level (Medial   plantar) is 112 mmHg; wound healing likely. This is considered to have a good   probability for healing. Left   Pulse volume recording at the level of the foot reveals waveforms within   normal limits. Laser skin perfusion pressure study at the transmetatarsal level ([place]) is   100 mmHg; wound healing likely. This is considered to have a good probability   for healing. Laser skin perfusion pressure study at the transmetatarsal level (Medial   [place]antar) is 82 mmHg; wound healing likely. This is considered to have a good   probability for healing. There are no previous studies for comparison. ASSESSMENT/PLAN  -s/p left foot partial fifth ray resection ([facility] 5/29/2022)  -Osteomyelitis, left fifth metatarsal  -Cellulitis, right lower extremity  -Serous bulla, left dorsal fourth interspace  -Full-thickness ulceration, distal lateral left foot-Bueno stage III  -Full-thickness ulceration, anterior left leg-Bueno stage II  -Full-thickness ulceration, right leg-Bueno stage II  -Full-thickness ulcerations, distal lateral right foot-Bueno stage II  -Superficial thickness ulceration, right fifth digit-Bueno stage I  -Deep tissue injury, bilateral heels    -Patient seen and examined at bedside this AM  -Hypertensive but other VSS, No CBC this AM  -ESR 48 and CRP 16.3  -lactic acid 0.8  -prealbumin 12.4; dietitian is on board  -Imagings reviewed, impression as noted above  -Wound culture: MRSA  -Surgical pathology pending  -Continue IV antibiotics per infectious disease  -Right lower extremity dressed with Adaptic, dakins soaked gauze, DSD, and Ace  -Left lower extremity was left clean, dry, and intact.  -Prevalon boots re-applied. Patient is to wear at all times while in bed to prevent further deep tissue injury.  -Patient is nonweightbearing at baseline    DISPO: Patient is s/p left foot partial 5th ray resection with graft application. Will follow up on intra-op path. Procedure was felt to be definitive. Patient is stable for discharge from a podiatric standpoint. Patient is to follow up with Dr. Ben Bowman within 1 week of discharge. Patient seen and evaluated at bedside with Dr. Jackson Zuñiga DPM.    Lino Stephens DPM  Podiatric Resident, PGY-1  Pager #: [phone] or Perfect Serve    Patient was seen and evaluated at bedside. Agree with residents assessment and treatment plan.   Jackson Zuñiga DPM

## 2022-06-01 NOTE — PROGRESS NOTES
ID Follow-up NOTE    CC:   DM foot and leg ulcers, L 5th MT osteomyelitis  Antibiotics: Daptomycin    Admit Date: 5/25/2022  Hospital Day: 8    Subjective:     POD#3 L partial 5th ray resection    Patient reports L foot burning. Expresses concern about R foot       Objective:     Patient Vitals for the past 8 hrs:   BP Temp Temp src Pulse Resp SpO2   06/01/22 1436 (!) 161/80 98.7 °F (37.1 °C) Oral 65 14 98 %   06/01/22 1022 138/78 -- -- (!) 48 -- --     I/O last 3 completed shifts: In: 960 [P.O.:960]  Out: 3500 [Urine:3500]  I/O this shift:  In: -   Out: 1150 [Urine:1150]    EXAM:  GENERAL: No apparent distress.     HEENT: Membranes moist, no oral lesion  NECK:  Supple, no lymphadenopathy  LUNGS: Clear b/l, no rales, no dullness  CARDIAC: RRR, no murmur appreciated  ABD:  + BS, soft / NT  EXT:  No rash, no edema, no lesions  Bilateral LE dressings   NEURO: No focal neurologic findings  PSYCH: Orientation, sensorium, mood normal  LINES:  Peripheral iv       Data Review:  Lab Results   Component Value Date    WBC 7.0 05/29/2022    HGB 10.4 (L) 05/29/2022    HCT 31.8 (L) 05/29/2022    MCV 91.4 05/29/2022     05/29/2022     Lab Results   Component Value Date    CREATININE 0.7 (L) 05/29/2022    BUN 34 (H) 05/29/2022     05/29/2022    K 4.7 05/29/2022     05/29/2022    CO2 24 05/29/2022       Hepatic Function Panel:   Lab Results   Component Value Date    ALKPHOS 45 04/22/2022    ALT 25 04/22/2022    AST 20 04/22/2022    PROT 6.3 04/22/2022    BILITOT <0.2 04/22/2022    BILIDIR <0.2 06/20/2021    IBILI see below 06/20/2021    LABALBU 3.0 04/22/2022       Micro:  05/25 Wound culture  - heavy growth mixed skin anne, mod MRSA  Staph aureus mrsa   Antibiotic Interpretation Microscan    ceFAZolin Resistant 8 mcg/mL   clindamycin Sensitive <=0.5 mcg/mL   DAPTOmycin Sensitive 1 mcg/mL   erythromycin Resistant >4 mcg/mL   linezolid Sensitive 4 mcg/mL   oxacillin Resistant >2 mcg/mL   tetracycline Sensitive <=4 mcg/mL   trimethoprim-sulfamethoxazole Sensitive <=0.5/9.5 mcg/mL   vancomycin Sensitive 2 mcg/mL        Imagin/25 X-ray Tibia, Fibula right - No acute findings.       X-ray left foot:  Impression   Acute osteomyelitis in the 5th metatarsal bone.       X-ray right foot - unremarkable.       Venous Doppler BL extremity  Nondiagnostic right ROSARIO due to vessel noncompressibility.    Normal left ROSARIO at rest.    Less than 50% stensoes of CFA and popliteal artery however this study is not    diagnostic due to imaging difficulties associated with leg edema. Clinical    correlation recommended.       MRI left foot  Impression   1.  Acute osteomyelitis in the 5th metatarsal bone and proximal phalanx with septic arthritis at the MTP joint. 2.  Regional cellulitis and myositis. 3.  No evidence of abscess. PATH:    Bone and tissue, left fifth metatarsal, foot:   Bone with focal acute osteomyelitis intermixed with subacute ( partially   treated ) osteomyelitis and degenerative changes. Portion of bone submitted as margin with extravasated blood and no   evidence of acute osteomyelitis.    No evidence of malignancy      Scheduled Meds:   daptomycin (CUBICIN) IVPB  6 mg/kg (Ideal) IntraVENous Q24H    miconazole   Topical BID    [Held by provider] aspirin  81 mg Oral Daily    atorvastatin  80 mg Oral Nightly    gabapentin  600 mg Oral BID    levothyroxine  75 mcg Oral QAM AC    pantoprazole  40 mg Oral QAM AC    tamsulosin  0.8 mg Oral Daily    traZODone  25 mg Oral Nightly    vitamin B-12  1,000 mcg Oral Daily    sodium chloride flush  5-40 mL IntraVENous 2 times per day    sodium hypochlorite   Irrigation Daily       Continuous Infusions:   sodium chloride 10 mL (22 1125)       PRN Meds:  sodium chloride flush, sodium chloride, ondansetron **OR** ondansetron, polyethylene glycol, acetaminophen **OR** acetaminophen, traMADol      Assessment:     [de-identified] yo man with hx AF, JARAD, CHF, neurogenic bladder with chronic schneider  Hx UTI, hx LE venous wounds, hx mult admission - last 4/22-25  Non-ambulatory, lives in [facility]     Presents with worse perigenital erythema, worse appearance LE wounds  Admit 5/25 - afeb, WBC 9.3, CRP 16.3  Seen by Podiatry  X-ray R foot 5th MT OM, MRI with R 5th prox phalanx / MTP joint / distal MT infection  Rx Vancomycin / Cefepime     Cult mod MRSA, heavy mixed skin anne     IMP/  LE wounds, not infected    R 5th MTP septic arthritis, R distal MT / prox phalanx OM  5/29 - R 5th partial ray resection, Stravix graft placed,, 'felt to be definitive'   - Path - margin with no acute osteomyelitis     Plan:     Cont Daptomycin    Discharge on Linezolid 600 mg po bid x 2 weeks   Postop care and f/u per Podiatry     Medical Decision Making:   The following items were considered in medical decision making:  Discussion of patient care with other providers  Reviewed clinical lab tests  Reviewed radiology tests  Reviewed other diagnostic tests/interventions  Independent review of radiologic images - reviewed with Radiologist 5/27  Microbiology cultures and other micro tests reviewed      Discussed with pt, daughter  Kisha Neal MD

## 2022-06-02 VITALS
DIASTOLIC BLOOD PRESSURE: 71 MMHG | HEIGHT: 71 IN | OXYGEN SATURATION: 94 % | SYSTOLIC BLOOD PRESSURE: 131 MMHG | HEART RATE: 60 BPM | BODY MASS INDEX: 29.51 KG/M2 | TEMPERATURE: 97.4 F | RESPIRATION RATE: 16 BRPM | WEIGHT: 210.76 LBS

## 2022-06-02 PROCEDURE — 6370000000 HC RX 637 (ALT 250 FOR IP): Performed by: INTERNAL MEDICINE

## 2022-06-02 PROCEDURE — 2580000003 HC RX 258: Performed by: STUDENT IN AN ORGANIZED HEALTH CARE EDUCATION/TRAINING PROGRAM

## 2022-06-02 PROCEDURE — 6370000000 HC RX 637 (ALT 250 FOR IP): Performed by: STUDENT IN AN ORGANIZED HEALTH CARE EDUCATION/TRAINING PROGRAM

## 2022-06-02 PROCEDURE — 99232 SBSQ HOSP IP/OBS MODERATE 35: CPT | Performed by: INTERNAL MEDICINE

## 2022-06-02 RX ORDER — FLUCONAZOLE 100 MG/1
100 TABLET ORAL DAILY
Status: DISCONTINUED | OUTPATIENT
Start: 2022-06-02 | End: 2022-06-02 | Stop reason: HOSPADM

## 2022-06-02 RX ADMIN — GABAPENTIN 600 MG: 600 TABLET, FILM COATED ORAL at 08:44

## 2022-06-02 RX ADMIN — CYANOCOBALAMIN TAB 1000 MCG 1000 MCG: 1000 TAB at 08:44

## 2022-06-02 RX ADMIN — FLUCONAZOLE 100 MG: 100 TABLET ORAL at 14:18

## 2022-06-02 RX ADMIN — MICONAZOLE NITRATE: 2 POWDER TOPICAL at 08:46

## 2022-06-02 RX ADMIN — DAKIN'S SOLUTION 0.125% (QUARTER STRENGTH): 0.12 SOLUTION at 08:46

## 2022-06-02 RX ADMIN — TAMSULOSIN HYDROCHLORIDE 0.8 MG: 0.4 CAPSULE ORAL at 08:44

## 2022-06-02 RX ADMIN — LEVOTHYROXINE SODIUM 75 MCG: 0.07 TABLET ORAL at 06:26

## 2022-06-02 RX ADMIN — PANTOPRAZOLE SODIUM 40 MG: 40 TABLET, DELAYED RELEASE ORAL at 06:26

## 2022-06-02 RX ADMIN — SODIUM CHLORIDE, PRESERVATIVE FREE 10 ML: 5 INJECTION INTRAVENOUS at 08:44

## 2022-06-02 RX ADMIN — TRAMADOL HYDROCHLORIDE 25 MG: 50 TABLET, COATED ORAL at 11:16

## 2022-06-02 ASSESSMENT — PAIN DESCRIPTION - ORIENTATION
ORIENTATION: RIGHT
ORIENTATION: POSTERIOR
ORIENTATION: LEFT;LOWER

## 2022-06-02 ASSESSMENT — PAIN SCALES - GENERAL
PAINLEVEL_OUTOF10: 5
PAINLEVEL_OUTOF10: 4
PAINLEVEL_OUTOF10: 6

## 2022-06-02 ASSESSMENT — PAIN DESCRIPTION - ONSET
ONSET: ON-GOING
ONSET: ON-GOING

## 2022-06-02 ASSESSMENT — PAIN DESCRIPTION - DESCRIPTORS
DESCRIPTORS: ACHING
DESCRIPTORS: ACHING

## 2022-06-02 ASSESSMENT — PAIN - FUNCTIONAL ASSESSMENT
PAIN_FUNCTIONAL_ASSESSMENT: PREVENTS OR INTERFERES SOME ACTIVE ACTIVITIES AND ADLS
PAIN_FUNCTIONAL_ASSESSMENT: ACTIVITIES ARE NOT PREVENTED

## 2022-06-02 ASSESSMENT — PAIN DESCRIPTION - PAIN TYPE
TYPE: ACUTE PAIN;CHRONIC PAIN
TYPE: ACUTE PAIN

## 2022-06-02 ASSESSMENT — PAIN DESCRIPTION - FREQUENCY
FREQUENCY: INTERMITTENT
FREQUENCY: INTERMITTENT

## 2022-06-02 ASSESSMENT — PAIN DESCRIPTION - LOCATION
LOCATION: ELBOW
LOCATION: BUTTOCKS
LOCATION: FOOT

## 2022-06-02 NOTE — CARE COORDINATION
[facility] can accept pt. CM notified pt's RN and left vm for son, Tru Factor of transport time. No other needs at this time. Electronically signed by ARIC Roman, ARIELW on 6/2/2022 at 4:03 PM  _____________________________________________________________________    Precert approved Jeramy Gutierrez ID: [id number]). CM called [address] [place] [address] [phone] to follow up on SNF status. Reviewing staff had questions about possible PT/OT needs and IV abx. CM informed that pt is non-ambulatory, only has the medical - wound care needs and PO abx. She will call CM back after staff finish reviewing.         Electronically signed by ARIC Roman, ARIELW on 6/2/2022 at 9:48 AM

## 2022-06-02 NOTE — PROGRESS NOTES
Podiatric Surgery Daily Progress Note      Admit Date: 5/25/2022      Code:Full Code    Patient seen and examined, labs and records reviewed    Subjective:     Patient seen at bedside this AM.  Patient is postop day 4 from a left foot partial fifth ray resection. Patient does complain of some mild pain coming from his right foot/leg but denies any pain from operative leg. Patient denies any overnight acute event. Patient denies f/c/n/v/sob/cp. Review of Systems: A 12 point review of symptoms is unremarkable with the exception of the chief complaint. Patient specifically denies nausea, fever, vomiting, chills, shortness of breath, chest pain, abdominal pain, constipation or difficulty urinating. Objective     /64   Pulse 62   Temp 98.1 °F (36.7 °C) (Oral)   Resp 20   Ht 5' 11\" (1.803 m)   Wt 218 lb 14.7 oz (99.3 kg)   SpO2 93%   BMI 30.53 kg/m²      I/O:    Intake/Output Summary (Last 24 hours) at 6/2/2022 0631  Last data filed at 6/2/2022 0432  Gross per 24 hour   Intake --   Output 2900 ml   Net -2900 ml              Wt Readings from Last 3 Encounters:   05/25/22 218 lb 14.7 oz (99.3 kg)   04/22/22 232 lb 12.8 oz (105.6 kg)   04/11/22 220 lb (99.8 kg)       LABS:    No results for input(s): WBC, HGB, HCT, PLT in the last 72 hours. No results for input(s): NA, K, CL, CO2, PHOS, BUN, CREATININE, CA in the last 72 hours. No results for input(s): PROT, INR, APTT in the last 72 hours. LOWER EXTREMITY EXAMINATION    Dressing to bilateral LE intact. No strikethrough noted to the external dressing. Mild serosanguineous drainage noted to the internal layers of the dressing noted to right lower extremity. VASCULAR: DP and PT pulses nonpalpable 0/4. Upon handheld doppler examination, DP and PT pulses were noted to have biphasic signals. CFT is brisk to the digits of the foot.  Skin temperature is warm to wark from proximal to distal. Non-pitting edema noted bilateral lower extremities, right greater than left. No pain with calf compression b/l.     NEUROLOGIC: Gross and epicritic sensation is intact. Protective sensation is diminished at all pedal sites.     DERMATOLOGIC: Diffuse dermatologic changes noted to right LE with erythema present from just proximal to the ankle joint to just distal to the knee joint which does not resolve with extremity elevation noted to be decreased during admission.      Right lower extremity:  Multiple full-thickness ulcerations noted to the anterior aspect and anterior lateral aspect of the right leg. Wound beds are fibrogranular with some slight periwound erythema noted but no purulent drainage, crepitus, fluctuance, or malodor noted. Superficial ulceration noted to the dorsal medial aspect of the right fifth digit. No acute signs of infection noted. Full-thickness ulceration noted at the fifth metatarsal head. Wound measures 2.0 cm x 2.0 cm x 0.1 cm and is a combination of granular and necrotic tissue. The wound does not tunnel or track and there appears to be no purulent drainage, crepitus, fluctuance, or active drainage noted. Full-thickness ulceration noted just proximal to the above mentioned wound on the fifth metatarsal. Wound measures 2.8 cm x 1.5 cm 0.1 cm with a relatively granular wound. The wound does not tunnel or track and there appears to be no purulence, crepitus, fluctuance, or active drainage. Deep tissue injury noted to the posterior lateral aspect of the right lower extremity. No fluctuance or crepitus noted. No purulent drainage expressed. No malodor noted. Patient provided verbal consent for pictures obtained today:            Left lower extremity was left clean, dry, and intact.      MUSCULOSKELETAL: Muscle strength is 4/5 for all pedal groups tested. Mild pain with palpation of the periwound areas to bilateral lower extremities. Ankle joint ROM is decreased in dorsiflexion with the knee extended.  No obvious biomechanical abnormalities. IMAGING:  XR Tib/Fib Right (5/25/2022)  Narrative   RIGHT TIBIA FIBULA  4 views       HISTORY: Pain       COMPARISON: none       FINDINGS:       No evidence of acute fracture or traumatic bony abnormality is seen.       Osteopenia is noted.           Impression       No acute fracture seen.      XR Left Foot (5/25/2022)  Narrative   EXAM: LEFT FOOT 3 VIEWS       INDICATION: Left 5th toe wound.       COMPARISON: 1/14/20       FINDINGS:       Wound or ulceration identified over the 5th MTP region. There is loss of cortical margin laterally suggesting erosive changes in the neck and head of the metatarsal bone. Bones are otherwise severely osteopenic. No fracture identified. Extensive vascular    calcifications.           Impression       Acute osteomyelitis in the 5th metatarsal bone.          XR foot right (5/25/2022)  Narrative   EXAM: RIGHT FOOT 3 VIEWS       INDICATION: Right foot wound       COMPARISON: None       FINDINGS:       Suboptimal positioning. Artifact from patient motion on the oblique view. Findings significantly limited exam in conjunction with the severe osteopenia. No definite erosion or fracture seen. There is diffuse soft tissue swelling. No definite soft tissue    gas.           Impression       Limited radiographs without definite acute osseous abnormality. Consider repeat and provide information regarding specific area of concern. XR Tib/Fib Left (5/25/2022)  Narrative   EXAM: LEFT TIBIA-FIBULA 2 VIEWS       INDICATION: Left leg wound       COMPARISON: None       FINDINGS:       Bones are diffusely osteopenic. No acute fracture or malalignment. No erosions seen. Diffuse vascular calcifications. Diffuse soft tissue swelling.           Impression       No acute osseous findings. Arterial Duplex Left Lower Extremity (5/26/2022)  Arterial Duplex Scan: Grayscale and color imaging of the extremity arteries,   including Doppler spectral waveform analysis.      Impressions   Right Impression   The ankle/brachial index is non-compressible (, PT >260mmHg). Left Impression   The ankle/brachial index is 1.13 (, PT 158mmHg). Multiphasic flow in the common femoral artery indicates no significant   aorto-iliac inflow disease. Atherosclerotic plaque in the common femoral artery and popliteal artery is   consistent with <50% stenosis. The superficial femoral artery appears to have multiphasic Doppler signals,   but is difficult to see in b-mode due to edema. The tibial trunk is not visualized, possibly due to edema, and the peroneal   artery is poorly seen, and is possibly patent. No previous left leg arterial duplex studies for comparison.       Conclusions        Summary        Nondiagnostic right ROSARIO due to vessel noncompressibility.    Normal left ROSARIO at rest.        Less than 50% stensoes of CFA and popliteal artery however this study is not    diagnostic due to imaging difficulties associated with leg edema. Clinical    correlation recommended. MRI Left Foot (5/27/2022)  Narrative   MRI FOOT LEFT W WO CONTRAST       Indication: Osteomyelitis of 5th metatarsal, left foot       Comparison: Radiograph 5/25/22       Technique: Multiplanar multisequence MR imaging of the left foot was performed without intravenous contrast.        Findings:       Wound or ulceration laterally over the forefoot 5th MTP joint region. 5th metatarsal head and neck erosions, confluent marrow placement and postcontrast enhancement extending as far proximally as the mid shaft consistent with acute osteomyelitis. Enhancement/synovitis in the MTP joint and diffuse marrow replacement in the proximal phalanx, consistent with septic arthritis and osteomyelitis, respectively.       Cellulitis and myositis in the soft tissues around the infected bone. No evidence of abscess.  There is more diffuse nonspecific edema signal extending over the dorsum of the foot in the subcutaneous space. No significant tenosynovitis. Extensive diffuse    atrophy throughout the intrinsic foot muscles consistent with chronic denervation.           Impression       1.  Acute osteomyelitis in the 5th metatarsal bone and proximal phalanx with septic arthritis at the MTP joint. 2.  Regional cellulitis and myositis. 3.  No evidence of abscess. Laser Skin Perfusion 5/27/2022  Tech Comments   Right   Pulse volume recording at the level of the foot reveals waveforms within   normal limits. Laser skin perfusion pressure study at the transmetatarsal level ([place]um) is   148 mmHg; wound healing likely. This is considered to have a good probability   for healing. Laser skin perfusion pressure study at the transmetatarsal level (Medial   plantar) is 112 mmHg; wound healing likely. This is considered to have a good   probability for healing. Left   Pulse volume recording at the level of the foot reveals waveforms within   normal limits. Laser skin perfusion pressure study at the transmetatarsal level ([place]um) is   100 mmHg; wound healing likely. This is considered to have a good probability   for healing. Laser skin perfusion pressure study at the transmetatarsal level (Medial   Plantar) is 82 mmHg; wound healing likely. This is considered to have a good   probability for healing. There are no previous studies for comparison.        ASSESSMENT/PLAN  -s/p left foot partial fifth ray resection (DOS 5/29/2022)  -Osteomyelitis, left fifth metatarsal  -Cellulitis, right lower extremity  -Serous bulla, left dorsal fourth interspace  -Full-thickness ulceration, distal lateral left foot-Bueno stage III  -Full-thickness ulceration, anterior left leg-Bueno stage II  -Full-thickness ulceration, right leg-Bueno stage II  -Full-thickness ulcerations, distal lateral right foot-Bueno stage II  -Superficial thickness ulceration, right fifth digit-Bueno stage I  -Deep tissue injury, bilateral heels    -Patient seen and examined at bedside this AM  -Hypertensive but other VSS, No CBC this AM  -ESR 48 and CRP 16.3  -lactic acid 0.8  -prealbumin 12.4; dietitian is on board  -Imagings reviewed, impression as noted above  -Wound culture: MRSA  -Surgical pathology showed clearance fragment was negative for osteomyelitis. -Patient is continued on daptomycin per ID recommendations. Patient is to be discharged on linezolid 600 mg p.o. twice daily for 2 weeks. -Right lower extremity dressed with Adaptic, dakins soaked gauze, DSD, and Ace  -Left lower extremity was left clean, dry, and intact.  -Prevalon boots re-applied. Patient is to wear at all times while in bed to prevent further deep tissue injury.  -Patient is nonweightbearing at baseline    DISPO: Patient is s/p left foot partial 5th ray resection with graft application. Procedure was felt to be definitive. Patient is stable for discharge from a podiatric standpoint. Patient is to follow up with Dr. Alise Fierro within 1 week of discharge. Patient assessment and plan was discussed with Dr. Manasa Grace DPM.    Isai Manzo DPM  Podiatric Resident, PGY-1  Pager #: [phone] or Perfect Serve    Patient was seen and evaluated at bedside. Agree with residents assessment and treatment plan.   Manasa Grace DPM

## 2022-06-02 NOTE — PROGRESS NOTES
Attempted to call report, however nurse was not in her office and left phone number to call back to receive report. Notified them that transport is scheduled for 1700.

## 2022-06-02 NOTE — CONSULTS
Nutrition Note       NUTRITION RECOMMENDATIONS:   1. PO Diet: Continue regular; 4CC; cardiac diet    NUTRITION ASSESSMENT:  Nutritional summary & status: Pt with good po intakes, consuming >76% of meals. Noted discharge plan. Will continue to monitor.  Admission/PMH: wounds / chronic skin wounds, chronic diastolic HF, bedridden, HTN, HLD, GERD, CAD    MALNUTRITION ASSESSMENT  Context of Malnutrition: Acute Illness   Malnutrition Status: No malnutrition    NUTRITION DIAGNOSIS   Increased nutrient needs related to increase demand for energy/nutrients as evidenced by wounds    [address] and/or Nutrient Delivery:  Continue Current Diet  Nutrition Education/Counseling:  No recommendation at this time     The patient will still be monitored per nutrition standards of care. Consult dietitian if nutrition interventions essential to patient care is needed.      Ewelina Guerrero, [address], [facility]:  [phone]  Office:  [phone]

## 2022-06-02 NOTE — PROGRESS NOTES
ID Follow-up NOTE    CC:   DM foot and leg ulcers, L 5th MT osteomyelitis  Antibiotics: Daptomycin    Admit Date: 5/25/2022  Hospital Day: 9    Subjective:     POD#4 L partial 5th ray resection    Patient reports no L foot pain. Expresses concern about R foot       Objective:     Patient Vitals for the past 8 hrs:   BP Temp Temp src Pulse Resp SpO2   06/02/22 0747 (!) 158/78 97.3 °F (36.3 °C) Oral 64 18 93 %   06/02/22 0409 118/64 98.1 °F (36.7 °C) Oral 62 20 93 %     I/O last 3 completed shifts:  In: -   Out: 6707 [Urine:3825]  I/O this shift:  In: 350 [P.O.:350]  Out: 850 [Urine:850]    EXAM:  GENERAL: No apparent distress.     HEENT: Membranes moist, no oral lesion  NECK:  Supple, no lymphadenopathy  LUNGS: Clear b/l, no rales, no dullness  CARDIAC: RRR, no murmur appreciated  ABD:  + BS, soft / NT  EXT:  No rash, no edema, no lesions  Bilateral LE dressings   Groin and buttock with erythema  NEURO: No focal neurologic findings  PSYCH: Orientation, sensorium, mood normal  LINES:  Peripheral iv       Data Review:  Lab Results   Component Value Date    WBC 7.0 05/29/2022    HGB 10.4 (L) 05/29/2022    HCT 31.8 (L) 05/29/2022    MCV 91.4 05/29/2022     05/29/2022     Lab Results   Component Value Date    CREATININE 0.7 (L) 05/29/2022    BUN 34 (H) 05/29/2022     05/29/2022    K 4.7 05/29/2022     05/29/2022    CO2 24 05/29/2022       Hepatic Function Panel:   Lab Results   Component Value Date    ALKPHOS 45 04/22/2022    ALT 25 04/22/2022    AST 20 04/22/2022    PROT 6.3 04/22/2022    BILITOT <0.2 04/22/2022    BILIDIR <0.2 06/20/2021    IBILI see below 06/20/2021    LABALBU 3.0 04/22/2022       Micro:  05/25 Wound culture  - heavy growth mixed skin anne, mod MRSA  Staph aureus mrsa   Antibiotic Interpretation Microscan    ceFAZolin Resistant 8 mcg/mL   clindamycin Sensitive <=0.5 mcg/mL   DAPTOmycin Sensitive 1 mcg/mL   erythromycin Resistant >4 mcg/mL   linezolid Sensitive 4 mcg/mL   oxacillin Resistant >2 mcg/mL   tetracycline Sensitive <=4 mcg/mL   trimethoprim-sulfamethoxazole Sensitive <=0.5/9.5 mcg/mL   vancomycin Sensitive 2 mcg/mL        Imagin/25 X-ray Tibia, Fibula right - No acute findings.       X-ray left foot:  Impression   Acute osteomyelitis in the 5th metatarsal bone.       X-ray right foot - unremarkable.       Venous Doppler BL extremity  Nondiagnostic right ROSARIO due to vessel noncompressibility.    Normal left ROSARIO at rest.    Less than 50% stensoes of CFA and popliteal artery however this study is not    diagnostic due to imaging difficulties associated with leg edema. Clinical    correlation recommended.       MRI left foot  Impression   1.  Acute osteomyelitis in the 5th metatarsal bone and proximal phalanx with septic arthritis at the MTP joint. 2.  Regional cellulitis and myositis. 3.  No evidence of abscess. PATH:    Bone and tissue, left fifth metatarsal, foot:   Bone with focal acute osteomyelitis intermixed with subacute ( partially   treated ) osteomyelitis and degenerative changes. Portion of bone submitted as margin with extravasated blood and no   evidence of acute osteomyelitis.    No evidence of malignancy      Scheduled Meds:   daptomycin (CUBICIN) IVPB  6 mg/kg (Ideal) IntraVENous Q24H    miconazole   Topical BID    [Held by provider] aspirin  81 mg Oral Daily    atorvastatin  80 mg Oral Nightly    gabapentin  600 mg Oral BID    levothyroxine  75 mcg Oral QAM AC    pantoprazole  40 mg Oral QAM AC    tamsulosin  0.8 mg Oral Daily    traZODone  25 mg Oral Nightly    vitamin B-12  1,000 mcg Oral Daily    sodium chloride flush  5-40 mL IntraVENous 2 times per day    sodium hypochlorite   Irrigation Daily       Continuous Infusions:   sodium chloride 10 mL (22 1125)       PRN Meds:  sodium chloride flush, sodium chloride, ondansetron **OR** ondansetron, polyethylene glycol, acetaminophen **OR** acetaminophen, traMADol      Assessment:     [de-identified] yo man with hx AF, JARAD, CHF, neurogenic bladder with chronic schneider  Hx UTI, hx LE venous wounds, hx mult admission - last 4/22-25  Non-ambulatory, lives in [facility]     Presents with worse perigenital erythema, worse appearance LE wounds  Admit 5/25 - afeb, WBC 9.3, CRP 16.3  Seen by Podiatry  X-ray R foot 5th MT OM, MRI with R 5th prox phalanx / MTP joint / distal MT infection  Rx Vancomycin / Cefepime     Cult mod MRSA, heavy mixed skin anne     IMP/  LE wounds, not infected    R 5th MTP septic arthritis, R distal MT / prox phalanx OM  5/29 - R 5th partial ray resection, Stravix graft placed,, 'felt to be definitive'   - Path - margin with no acute osteomyelitis     Plan:     Cont Daptomycin    Skin care  Add Fluconazole    Discharge on Linezolid 600 mg po bid x 2 weeks   Postop care and f/u per Podiatry     Medical Decision Making:   The following items were considered in medical decision making:  Discussion of patient care with other providers  Reviewed clinical lab tests  Reviewed radiology tests  Reviewed other diagnostic tests/interventions  Independent review of radiologic images - reviewed with Radiologist 5/27  Microbiology cultures and other micro tests reviewed      Discussed with pt, RN  Rad Castorena MD

## 2022-06-02 NOTE — PROGRESS NOTES
Report given to katy at the courtyard. Belongings packed including cell phone. Pt aware of discharge. Iv removed and schneider remains in place. No distress noted. Pt stable for discharge awaiting 1700 .

## 2022-06-02 NOTE — PLAN OF CARE
Problem: Pain  Goal: Verbalizes/displays adequate comfort level or baseline comfort level  Outcome: Progressing     Problem: Discharge Planning  Goal: Discharge to home or other facility with appropriate resources  6/2/2022 0327 by Pallavi Rodríguez RN  Outcome: Progressing  Flowsheets (Taken 6/2/2022 0327)  Discharge to home or other facility with appropriate resources:   Identify barriers to discharge with patient and caregiver   Arrange for needed discharge resources and transportation as appropriate  Note: Pre-cert pending for pt to discharge to SNF.  6/2/2022 0327 by Pallavi Rodríguez RN  Outcome: Progressing  Flowsheets (Taken 6/2/2022 0327)  Discharge to home or other facility with appropriate resources:   Identify barriers to discharge with patient and caregiver   Arrange for needed discharge resources and transportation as appropriate     Problem: Pain  Goal: Verbalizes/displays adequate comfort level or baseline comfort level  6/2/2022 0327 by Pallavi Rodríguez RN  Outcome: Progressing  Flowsheets (Taken 6/2/2022 0327)  Verbalizes/displays adequate comfort level or baseline comfort level:   Encourage patient to monitor pain and request assistance   Assess pain using appropriate pain scale   Administer analgesics based on type and severity of pain and evaluate response  Note: Pain rated 4-5/10 in feet and toes. PRN medication administered with relief.   6/2/2022 0327 by Pallavi Rodríguez RN  Outcome: Progressing  Flowsheets (Taken 6/2/2022 0327)  Verbalizes/displays adequate comfort level or baseline comfort level:   Encourage patient to monitor pain and request assistance   Assess pain using appropriate pain scale   Administer analgesics based on type and severity of pain and evaluate response     Problem: Safety - Adult  Goal: Free from fall injury  6/2/2022 0327 by Pallavi Rodríguez RN  Outcome: Progressing  Flowsheets (Taken 6/2/2022 0327)  Free From Fall Injury: Based on caregiver fall risk screen,

## 2022-06-02 NOTE — PLAN OF CARE
Problem: Pain  Goal: Verbalizes/displays adequate comfort level or baseline comfort level  6/2/2022 0940 by Ramandeep Mcneil RN  Outcome: Progressing  Note: Pt complains of pain 6/10 as podiatry just changed leg dressing, but says the pain will settle down soon. Pt states he has pain when changing sacral wound, will offer pain meds prior to dressing change. Will monitor. Problem: Skin/Tissue Integrity  Goal: Absence of new skin breakdown  Description: 1. Monitor for areas of redness and/or skin breakdown  2. Assess vascular access sites hourly  3. Every 4-6 hours minimum:  Change oxygen saturation probe site  4. Every 4-6 hours:  If on nasal continuous positive airway pressure, respiratory therapy assess nares and determine need for appliance change or resting period. 6/2/2022 0940 by Ramandeep Mcneil RN  Outcome: Progressing  Note: Pt with open wounds that have been addressed by wound care and podiatry.

## 2022-06-02 NOTE — DISCHARGE SUMMARY
Hospital Medicine Discharge Summary    Patient ID: Mariann Reyes      Patient's PCP: Stalin White Date: 5/25/2022     Discharge Date:     Admitting Physician: Julio C Castillo MD     Discharge Physician: Avery Harrington MD     Discharge Diagnoses:  Did not discharge on same day of discharge due to pending insurance precert approval       Active Hospital Problems    Diagnosis Date Noted    Weight loss counseling, encounter for [Z71.3]      Priority: Medium    Elevated sed rate [R70.0]      Priority: Medium    Elevated C-reactive protein (CRP) [R79.82]      Priority: Medium    Infection requiring contact isolation precautions [B99.9]      Priority: Medium    Class 1 obesity due to excess calories with body mass index (BMI) of 30.0 to 30.9 in adult [E66.09, Z68.30]      Priority: Medium    Chronic multifocal osteomyelitis of right foot ([address] [facility] 05/27/2022     Priority: Medium    MRSA infection [A49.02] 05/27/2022     Priority: Medium    Septic arthritis of interphalangeal joint of toe of right foot ([address] [M00.9] 05/27/2022     Priority: Medium    Osteomyelitis [facility]) [M86.9] 05/25/2022     Priority: Medium       The patient was seen and examined on day of discharge and this discharge summary is in conjunction with any daily progress note from day of discharge. Condition at discharge - stable    Hospital Course: patient seen and evaluated on the day of discharge. Patient informed about following up with appointments. Patient verbalized understanding for follow-up appointments. The patient and / or the family were informed of the results of tests, a time was given to answer questions, a plan was proposed and they agreed with plan. Medical reconciliation performed. Patient discharged stable condition.       #Acute osteomyelitis of the left fifth toe s/p  L partial 5th ray resection (5/29)  #Chronic skin wounds  #Fungal infection/tinea curious  #Chronic indwelling Iglesias catheter  #Recent UTI with MDRO  #Recent C. difficile infection  #Chronic diastolic heart failure  #Bedridden  #History of paroxysmal A. fib  #Hypertension  #Hyperlipidemia  #GERD  #CAD     Appreciate ID and podiatry consultation  F/U OR pathology/cx and ID recommendations  Analgesia as needed  Discharge today with Linezolid 600mg po BID x 2 weeks per ID. Follow cultures     # Remainder of medical conditions  -home management except as above    Dc on linezolid per ID, patient to be discharged in stable in stable condition, patient agreed with the discharge plan          Exam:     /75   Pulse 68   Temp 97 °F (36.1 °C) (Oral)   Resp 18   Ht 5' 11\" (1.803 m)   Wt 210 lb 12.2 oz (95.6 kg)   SpO2 93%   BMI 29.40 kg/m²     General appearance: No apparent distress  HEENT:  Conjunctivae/corneas clear. Neck: Supple, No jugular venous distention. Respiratory:  Normal respiratory effort. Clear to auscultation, bilaterally without Rales/Wheezes/Rhonchi. Cardiovascular: Regular rate and rhythm with normal S1/S2 without murmurs, rubs or gallops. Abdomen: Soft, non-tender, non-distended, normal bowel sounds. Musculoskelatal: No clubbing, cyanosis or edema bilaterally. Skin: Skin color, texture, turgor normal.   Neurologic: no focal neurologic deficits. grossly non-focal.  Psychiatric: Alert and oriented, normal mood    Consults:     IP CONSULT TO PODIATRY  IP CONSULT TO HOSPITALIST  IP CONSULT TO INFECTIOUS DISEASES  IP CONSULT TO DIETITIAN  IP CONSULT TO SOCIAL WORK      Code Status:  Full Code    Activity: activity as tolerated    Labs:  For convenience and continuity at follow-up the following most recent labs are provided:      CBC:    Lab Results   Component Value Date    WBC 7.0 05/29/2022    HGB 10.4 05/29/2022    HCT 31.8 05/29/2022     05/29/2022       Renal:    Lab Results   Component Value Date     05/29/2022    K 4.7 05/29/2022     05/29/2022    CO2 24 05/29/2022    BUN 34 05/29/2022    CREATININE 0.7 05/29/2022    CALCIUM 8.5 05/29/2022    PHOS 4.0 11/04/2021       Discharge Medications:     Current Discharge Medication List           Details   linezolid (ZYVOX) 600 MG tablet Take 1 tablet by mouth 2 times daily for 14 days  Qty: 28 tablet, Refills: 0              Details   Nutritional Supplements (RONEY PO) Take 1 packet by mouth 2 times daily      Nutritional Supplements (ENSURE HIGH PROTEIN) LIQD Take 1 Can by mouth 2 times daily      Wound Dressings (MEPILEX EX) Apply topically Cleanse skin graft areas with normal saline, pat dry, apply mepilex foam board, change every 3 days as needed. traZODone (DESYREL) 50 MG tablet Take 25 mg by mouth nightly      loperamide (IMODIUM) 2 MG capsule Take 2 mg by mouth 4 times daily as needed for Diarrhea      Melatonin 5 MG CAPS Take 1 capsule by mouth nightly      levothyroxine (SYNTHROID) 75 MCG tablet Take 1 tablet by mouth Daily  Qty: 30 tablet, Refills: 3      albuterol sulfate HFA (PROVENTIL HFA) 108 (90 Base) MCG/ACT inhaler Inhale 2 puffs into the lungs every 6 hours as needed for Wheezing or Shortness of Breath  Qty: 18 g, Refills: 3      guaiFENesin (MUCINEX) 600 MG extended release tablet Take 1 tablet by mouth 2 times daily as needed for Congestion  Qty: 60 tablet, Refills: 2      ferrous sulfate (IRON 325) 325 (65 Fe) MG tablet Take 1 tablet by mouth 2 times daily  Qty: 180 tablet, Refills: 1      Balsam [place]-Castor Oil (VENELEX) OINT ointment Apply topically daily as needed      docusate sodium (COLACE) 100 MG capsule Take 100 mg by mouth daily       gabapentin (NEURONTIN) 600 MG tablet Take 600 mg by mouth 2 times daily.       dicyclomine (BENTYL) 20 MG tablet Take 20 mg by mouth 3 times daily as needed      vitamin B-12 (CYANOCOBALAMIN) 1000 MCG tablet Take 1,000 mcg by mouth daily      aspirin 81 MG chewable tablet Take 1 tablet by mouth daily  Qty: 30 tablet, Refills: 3      pantoprazole (PROTONIX) 40 MG tablet Take 1 tablet by mouth every morning (before breakfast)  Qty: 30 tablet, Refills: 3      tamsulosin (FLOMAX) 0.4 MG capsule Take 0.8 mg by mouth daily       atorvastatin (LIPITOR) 80 MG tablet Take 80 mg by mouth nightly. Time Spent on discharge is more than 30 mints in the examination, evaluation, counseling and review of medications and discharge plan. Signed:    Chau Schultz MD   6/2/2022      Thank you NIES Stafford for the opportunity to be involved in this patient's care. If you have any questions or concerns please feel free to contact me at [phone].

## 2022-06-28 ENCOUNTER — HOSPITAL ENCOUNTER (INPATIENT)
Age: 82
LOS: 9 days | Discharge: NURSING, REHAB OR LONG-TERM CARE | DRG: 699 | End: 2022-07-08
Attending: EMERGENCY MEDICINE | Admitting: INTERNAL MEDICINE
Payer: MEDICARE

## 2022-06-28 DIAGNOSIS — N39.0 COMPLICATED UTI (URINARY TRACT INFECTION): Primary | ICD-10-CM

## 2022-06-28 LAB
ANION GAP SERPL CALCULATED.3IONS-SCNC: 10 MMOL/L (ref 3–16)
BACTERIA: ABNORMAL /HPF
BASOPHILS ABSOLUTE: 0.1 K/UL (ref 0–0.2)
BASOPHILS RELATIVE PERCENT: 1.1 %
BILIRUBIN URINE: NEGATIVE
BLOOD, URINE: ABNORMAL
BUN BLDV-MCNC: 24 MG/DL (ref 7–20)
CALCIUM SERPL-MCNC: 8.4 MG/DL (ref 8.3–10.6)
CHLORIDE BLD-SCNC: 111 MMOL/L (ref 99–110)
CLARITY: CLEAR
CO2: 21 MMOL/L (ref 21–32)
COLOR: YELLOW
CREAT SERPL-MCNC: 0.7 MG/DL (ref 0.8–1.3)
EOSINOPHILS ABSOLUTE: 0.7 K/UL (ref 0–0.6)
EOSINOPHILS RELATIVE PERCENT: 9.9 %
EPITHELIAL CELLS, UA: ABNORMAL /HPF (ref 0–5)
GFR AFRICAN AMERICAN: >60
GFR NON-AFRICAN AMERICAN: >60
GLUCOSE BLD-MCNC: 111 MG/DL (ref 70–99)
GLUCOSE URINE: NEGATIVE MG/DL
HCT VFR BLD CALC: 35.9 % (ref 40.5–52.5)
HEMOGLOBIN: 11.4 G/DL (ref 13.5–17.5)
KETONES, URINE: NEGATIVE MG/DL
LACTIC ACID, SEPSIS: 0.9 MMOL/L (ref 0.4–1.9)
LEUKOCYTE ESTERASE, URINE: ABNORMAL
LYMPHOCYTES ABSOLUTE: 1.2 K/UL (ref 1–5.1)
LYMPHOCYTES RELATIVE PERCENT: 16.5 %
MCH RBC QN AUTO: 30.5 PG (ref 26–34)
MCHC RBC AUTO-ENTMCNC: 31.8 G/DL (ref 31–36)
MCV RBC AUTO: 96 FL (ref 80–100)
MICROSCOPIC EXAMINATION: YES
MONOCYTES ABSOLUTE: 0.4 K/UL (ref 0–1.3)
MONOCYTES RELATIVE PERCENT: 5.4 %
NEUTROPHILS ABSOLUTE: 4.8 K/UL (ref 1.7–7.7)
NEUTROPHILS RELATIVE PERCENT: 67.1 %
NITRITE, URINE: POSITIVE
PDW BLD-RTO: 19.9 % (ref 12.4–15.4)
PH UA: 6 (ref 5–8)
PLATELET # BLD: 161 K/UL (ref 135–450)
PMV BLD AUTO: 9.3 FL (ref 5–10.5)
POTASSIUM REFLEX MAGNESIUM: 5.1 MMOL/L (ref 3.5–5.1)
PROTEIN UA: 30 MG/DL
RBC # BLD: 3.74 M/UL (ref 4.2–5.9)
RBC UA: ABNORMAL /HPF (ref 0–4)
SODIUM BLD-SCNC: 142 MMOL/L (ref 136–145)
SPECIFIC GRAVITY UA: >=1.03 (ref 1–1.03)
URINE TYPE: ABNORMAL
UROBILINOGEN, URINE: 0.2 E.U./DL
WBC # BLD: 7.2 K/UL (ref 4–11)
WBC UA: ABNORMAL /HPF (ref 0–5)

## 2022-06-28 PROCEDURE — 80048 BASIC METABOLIC PNL TOTAL CA: CPT

## 2022-06-28 PROCEDURE — 87186 SC STD MICRODIL/AGAR DIL: CPT

## 2022-06-28 PROCEDURE — 2580000003 HC RX 258: Performed by: EMERGENCY MEDICINE

## 2022-06-28 PROCEDURE — 6360000002 HC RX W HCPCS: Performed by: EMERGENCY MEDICINE

## 2022-06-28 PROCEDURE — 36415 COLL VENOUS BLD VENIPUNCTURE: CPT

## 2022-06-28 PROCEDURE — 87086 URINE CULTURE/COLONY COUNT: CPT

## 2022-06-28 PROCEDURE — 84132 ASSAY OF SERUM POTASSIUM: CPT

## 2022-06-28 PROCEDURE — 99285 EMERGENCY DEPT VISIT HI MDM: CPT

## 2022-06-28 PROCEDURE — 83605 ASSAY OF LACTIC ACID: CPT

## 2022-06-28 PROCEDURE — 87077 CULTURE AEROBIC IDENTIFY: CPT

## 2022-06-28 PROCEDURE — 85025 COMPLETE CBC W/AUTO DIFF WBC: CPT

## 2022-06-28 PROCEDURE — 81001 URINALYSIS AUTO W/SCOPE: CPT

## 2022-06-28 PROCEDURE — 96365 THER/PROPH/DIAG IV INF INIT: CPT

## 2022-06-28 RX ADMIN — CEFEPIME HYDROCHLORIDE 2000 MG: 2 INJECTION, POWDER, FOR SOLUTION INTRAVENOUS at 22:08

## 2022-06-28 ASSESSMENT — PAIN DESCRIPTION - FREQUENCY: FREQUENCY: CONTINUOUS

## 2022-06-28 ASSESSMENT — PAIN DESCRIPTION - ORIENTATION: ORIENTATION: MID

## 2022-06-28 ASSESSMENT — PAIN DESCRIPTION - DESCRIPTORS: DESCRIPTORS: DISCOMFORT;BURNING

## 2022-06-28 ASSESSMENT — PAIN SCALES - GENERAL: PAINLEVEL_OUTOF10: 10

## 2022-06-28 ASSESSMENT — PAIN - FUNCTIONAL ASSESSMENT
PAIN_FUNCTIONAL_ASSESSMENT: 0-10
PAIN_FUNCTIONAL_ASSESSMENT: ACTIVITIES ARE NOT PREVENTED

## 2022-06-28 ASSESSMENT — PAIN DESCRIPTION - LOCATION: LOCATION: PENIS;SCROTUM

## 2022-06-28 ASSESSMENT — PAIN DESCRIPTION - ONSET: ONSET: ON-GOING

## 2022-06-28 ASSESSMENT — PAIN DESCRIPTION - PAIN TYPE: TYPE: ACUTE PAIN

## 2022-06-29 ENCOUNTER — APPOINTMENT (OUTPATIENT)
Dept: GENERAL RADIOLOGY | Age: 82
DRG: 699 | End: 2022-06-29
Payer: MEDICARE

## 2022-06-29 PROBLEM — N39.0 UTI (URINARY TRACT INFECTION): Status: ACTIVE | Noted: 2022-06-29

## 2022-06-29 LAB
ANION GAP SERPL CALCULATED.3IONS-SCNC: 8 MMOL/L (ref 3–16)
BUN BLDV-MCNC: 23 MG/DL (ref 7–20)
C-REACTIVE PROTEIN: <3 MG/L (ref 0–5.1)
CALCIUM SERPL-MCNC: 8.3 MG/DL (ref 8.3–10.6)
CHLORIDE BLD-SCNC: 112 MMOL/L (ref 99–110)
CO2: 22 MMOL/L (ref 21–32)
CREAT SERPL-MCNC: 0.6 MG/DL (ref 0.8–1.3)
GFR AFRICAN AMERICAN: >60
GFR NON-AFRICAN AMERICAN: >60
GLUCOSE BLD-MCNC: 114 MG/DL (ref 70–99)
MAGNESIUM: 1.7 MG/DL (ref 1.8–2.4)
POTASSIUM REFLEX MAGNESIUM: 4.3 MMOL/L (ref 3.5–5.1)
POTASSIUM SERPL-SCNC: 4.6 MMOL/L (ref 3.5–5.1)
PREALBUMIN: 14.1 MG/DL (ref 20–40)
SEDIMENTATION RATE, ERYTHROCYTE: 14 MM/HR (ref 0–20)
SODIUM BLD-SCNC: 142 MMOL/L (ref 136–145)
TSH REFLEX: 1.51 UIU/ML (ref 0.27–4.2)

## 2022-06-29 PROCEDURE — 36415 COLL VENOUS BLD VENIPUNCTURE: CPT

## 2022-06-29 PROCEDURE — 6370000000 HC RX 637 (ALT 250 FOR IP): Performed by: STUDENT IN AN ORGANIZED HEALTH CARE EDUCATION/TRAINING PROGRAM

## 2022-06-29 PROCEDURE — 6370000000 HC RX 637 (ALT 250 FOR IP)

## 2022-06-29 PROCEDURE — 6360000002 HC RX W HCPCS: Performed by: STUDENT IN AN ORGANIZED HEALTH CARE EDUCATION/TRAINING PROGRAM

## 2022-06-29 PROCEDURE — 86140 C-REACTIVE PROTEIN: CPT

## 2022-06-29 PROCEDURE — 1200000000 HC SEMI PRIVATE

## 2022-06-29 PROCEDURE — 6360000002 HC RX W HCPCS

## 2022-06-29 PROCEDURE — 73630 X-RAY EXAM OF FOOT: CPT

## 2022-06-29 PROCEDURE — 84443 ASSAY THYROID STIM HORMONE: CPT

## 2022-06-29 PROCEDURE — 80048 BASIC METABOLIC PNL TOTAL CA: CPT

## 2022-06-29 PROCEDURE — 85652 RBC SED RATE AUTOMATED: CPT

## 2022-06-29 PROCEDURE — 51702 INSERT TEMP BLADDER CATH: CPT

## 2022-06-29 PROCEDURE — 84134 ASSAY OF PREALBUMIN: CPT

## 2022-06-29 PROCEDURE — 2580000003 HC RX 258

## 2022-06-29 PROCEDURE — 83735 ASSAY OF MAGNESIUM: CPT

## 2022-06-29 RX ORDER — GABAPENTIN 600 MG/1
600 TABLET ORAL 2 TIMES DAILY
Status: DISCONTINUED | OUTPATIENT
Start: 2022-06-29 | End: 2022-07-08 | Stop reason: HOSPADM

## 2022-06-29 RX ORDER — MECOBALAMIN 5000 MCG
5 TABLET,DISINTEGRATING ORAL NIGHTLY PRN
Status: DISCONTINUED | OUTPATIENT
Start: 2022-06-29 | End: 2022-07-08 | Stop reason: HOSPADM

## 2022-06-29 RX ORDER — POLYETHYLENE GLYCOL 3350 17 G/17G
17 POWDER, FOR SOLUTION ORAL DAILY PRN
Status: DISCONTINUED | OUTPATIENT
Start: 2022-06-29 | End: 2022-07-03

## 2022-06-29 RX ORDER — ALBUTEROL SULFATE 2.5 MG/3ML
2.5 SOLUTION RESPIRATORY (INHALATION) EVERY 4 HOURS PRN
Status: DISCONTINUED | OUTPATIENT
Start: 2022-06-29 | End: 2022-07-08 | Stop reason: HOSPADM

## 2022-06-29 RX ORDER — PANTOPRAZOLE SODIUM 40 MG/1
40 TABLET, DELAYED RELEASE ORAL
Status: DISCONTINUED | OUTPATIENT
Start: 2022-06-29 | End: 2022-07-08 | Stop reason: HOSPADM

## 2022-06-29 RX ORDER — MECOBALAMIN 5000 MCG
5 TABLET,DISINTEGRATING ORAL NIGHTLY
Status: DISCONTINUED | OUTPATIENT
Start: 2022-06-29 | End: 2022-06-29

## 2022-06-29 RX ORDER — ACETAMINOPHEN 650 MG/1
650 SUPPOSITORY RECTAL EVERY 6 HOURS PRN
Status: DISCONTINUED | OUTPATIENT
Start: 2022-06-29 | End: 2022-07-08 | Stop reason: HOSPADM

## 2022-06-29 RX ORDER — MAGNESIUM SULFATE IN WATER 40 MG/ML
2000 INJECTION, SOLUTION INTRAVENOUS ONCE
Status: COMPLETED | OUTPATIENT
Start: 2022-06-29 | End: 2022-06-29

## 2022-06-29 RX ORDER — SODIUM CHLORIDE 9 MG/ML
INJECTION, SOLUTION INTRAVENOUS PRN
Status: DISCONTINUED | OUTPATIENT
Start: 2022-06-29 | End: 2022-07-08 | Stop reason: HOSPADM

## 2022-06-29 RX ORDER — NYSTATIN 100000 U/G
CREAM TOPICAL 2 TIMES DAILY
Status: DISCONTINUED | OUTPATIENT
Start: 2022-06-29 | End: 2022-07-02

## 2022-06-29 RX ORDER — ASPIRIN 81 MG/1
81 TABLET, CHEWABLE ORAL DAILY
Status: DISCONTINUED | OUTPATIENT
Start: 2022-06-29 | End: 2022-07-08 | Stop reason: HOSPADM

## 2022-06-29 RX ORDER — ONDANSETRON 2 MG/ML
4 INJECTION INTRAMUSCULAR; INTRAVENOUS EVERY 6 HOURS PRN
Status: DISCONTINUED | OUTPATIENT
Start: 2022-06-29 | End: 2022-07-08 | Stop reason: HOSPADM

## 2022-06-29 RX ORDER — SODIUM CHLORIDE 0.9 % (FLUSH) 0.9 %
5-40 SYRINGE (ML) INJECTION EVERY 12 HOURS SCHEDULED
Status: DISCONTINUED | OUTPATIENT
Start: 2022-06-29 | End: 2022-07-08 | Stop reason: HOSPADM

## 2022-06-29 RX ORDER — LEVOTHYROXINE SODIUM 0.07 MG/1
75 TABLET ORAL
Status: DISCONTINUED | OUTPATIENT
Start: 2022-06-29 | End: 2022-07-08 | Stop reason: HOSPADM

## 2022-06-29 RX ORDER — SODIUM CHLORIDE 9 MG/ML
INJECTION, SOLUTION INTRAVENOUS CONTINUOUS
Status: ACTIVE | OUTPATIENT
Start: 2022-06-29 | End: 2022-06-29

## 2022-06-29 RX ORDER — ACETAMINOPHEN 325 MG/1
650 TABLET ORAL EVERY 6 HOURS PRN
Status: DISCONTINUED | OUTPATIENT
Start: 2022-06-29 | End: 2022-07-08 | Stop reason: HOSPADM

## 2022-06-29 RX ORDER — ONDANSETRON 4 MG/1
4 TABLET, ORALLY DISINTEGRATING ORAL EVERY 8 HOURS PRN
Status: DISCONTINUED | OUTPATIENT
Start: 2022-06-29 | End: 2022-07-08 | Stop reason: HOSPADM

## 2022-06-29 RX ORDER — TAMSULOSIN HYDROCHLORIDE 0.4 MG/1
0.8 CAPSULE ORAL DAILY
Status: DISCONTINUED | OUTPATIENT
Start: 2022-06-29 | End: 2022-07-08 | Stop reason: HOSPADM

## 2022-06-29 RX ORDER — ENOXAPARIN SODIUM 100 MG/ML
30 INJECTION SUBCUTANEOUS 2 TIMES DAILY
Status: DISCONTINUED | OUTPATIENT
Start: 2022-06-29 | End: 2022-07-08 | Stop reason: HOSPADM

## 2022-06-29 RX ORDER — ATORVASTATIN CALCIUM 40 MG/1
80 TABLET, FILM COATED ORAL NIGHTLY
Status: DISCONTINUED | OUTPATIENT
Start: 2022-06-29 | End: 2022-07-08 | Stop reason: HOSPADM

## 2022-06-29 RX ORDER — TRAZODONE HYDROCHLORIDE 50 MG/1
25 TABLET ORAL NIGHTLY
Status: DISCONTINUED | OUTPATIENT
Start: 2022-06-29 | End: 2022-07-08 | Stop reason: HOSPADM

## 2022-06-29 RX ORDER — NYSTATIN 100000 U/G
OINTMENT TOPICAL 2 TIMES DAILY
Status: DISCONTINUED | OUTPATIENT
Start: 2022-06-29 | End: 2022-06-29

## 2022-06-29 RX ORDER — SODIUM CHLORIDE 0.9 % (FLUSH) 0.9 %
5-40 SYRINGE (ML) INJECTION PRN
Status: DISCONTINUED | OUTPATIENT
Start: 2022-06-29 | End: 2022-07-08 | Stop reason: HOSPADM

## 2022-06-29 RX ADMIN — LEVOTHYROXINE SODIUM 75 MCG: 0.07 TABLET ORAL at 06:34

## 2022-06-29 RX ADMIN — VANCOMYCIN HYDROCHLORIDE 1500 MG: 10 INJECTION, POWDER, LYOPHILIZED, FOR SOLUTION INTRAVENOUS at 04:15

## 2022-06-29 RX ADMIN — GABAPENTIN 600 MG: 600 TABLET, FILM COATED ORAL at 07:59

## 2022-06-29 RX ADMIN — SODIUM CHLORIDE, PRESERVATIVE FREE 10 ML: 5 INJECTION INTRAVENOUS at 20:53

## 2022-06-29 RX ADMIN — CEFEPIME HYDROCHLORIDE 2000 MG: 2 INJECTION, POWDER, FOR SOLUTION INTRAVENOUS at 20:56

## 2022-06-29 RX ADMIN — TAMSULOSIN HYDROCHLORIDE 0.8 MG: 0.4 CAPSULE ORAL at 07:59

## 2022-06-29 RX ADMIN — ATORVASTATIN CALCIUM 80 MG: 40 TABLET, FILM COATED ORAL at 20:51

## 2022-06-29 RX ADMIN — ENOXAPARIN SODIUM 30 MG: 100 INJECTION SUBCUTANEOUS at 20:58

## 2022-06-29 RX ADMIN — ACETAMINOPHEN 325MG 650 MG: 325 TABLET ORAL at 02:36

## 2022-06-29 RX ADMIN — ASPIRIN 81 MG 81 MG: 81 TABLET ORAL at 07:59

## 2022-06-29 RX ADMIN — VANCOMYCIN HYDROCHLORIDE 750 MG: 10 INJECTION, POWDER, LYOPHILIZED, FOR SOLUTION INTRAVENOUS at 17:09

## 2022-06-29 RX ADMIN — SODIUM CHLORIDE: 9 INJECTION, SOLUTION INTRAVENOUS at 02:39

## 2022-06-29 RX ADMIN — MAGNESIUM SULFATE HEPTAHYDRATE 2000 MG: 2 INJECTION, SOLUTION INTRAVENOUS at 09:35

## 2022-06-29 RX ADMIN — PANTOPRAZOLE SODIUM 40 MG: 40 TABLET, DELAYED RELEASE ORAL at 06:34

## 2022-06-29 RX ADMIN — TRAZODONE HYDROCHLORIDE 25 MG: 50 TABLET ORAL at 23:14

## 2022-06-29 RX ADMIN — Medication 5 MG: at 23:14

## 2022-06-29 RX ADMIN — NYSTATIN: 100000 CREAM TOPICAL at 20:52

## 2022-06-29 RX ADMIN — GABAPENTIN 600 MG: 600 TABLET, FILM COATED ORAL at 20:52

## 2022-06-29 RX ADMIN — ENOXAPARIN SODIUM 30 MG: 100 INJECTION SUBCUTANEOUS at 14:31

## 2022-06-29 RX ADMIN — CEFEPIME HYDROCHLORIDE 2000 MG: 2 INJECTION, POWDER, FOR SOLUTION INTRAVENOUS at 11:53

## 2022-06-29 ASSESSMENT — PAIN SCALES - GENERAL: PAINLEVEL_OUTOF10: 10

## 2022-06-29 ASSESSMENT — PAIN DESCRIPTION - PAIN TYPE: TYPE: CHRONIC PAIN;ACUTE PAIN

## 2022-06-29 ASSESSMENT — PAIN DESCRIPTION - ONSET: ONSET: ON-GOING

## 2022-06-29 ASSESSMENT — PAIN - FUNCTIONAL ASSESSMENT: PAIN_FUNCTIONAL_ASSESSMENT: PREVENTS OR INTERFERES SOME ACTIVE ACTIVITIES AND ADLS

## 2022-06-29 ASSESSMENT — PAIN DESCRIPTION - DESCRIPTORS: DESCRIPTORS: ACHING;DISCOMFORT

## 2022-06-29 ASSESSMENT — PAIN DESCRIPTION - FREQUENCY: FREQUENCY: CONTINUOUS

## 2022-06-29 NOTE — PROGRESS NOTES
Clinical Pharmacy Progress Note    IV Vancomycin - Management by Pharmacy    Consult Date(s): 6/29/22  Consulting Provider(s): Dr. Digna Ron. Assessment / Plan    UTI - Vancomycin   Concurrent Antimicrobials: Cefepime    Day of Vanc Therapy / Ordered Duration: #1   Current Dosing Method: Intermittent Dosing by Levels   Therapeutic Goal: 400-600 mg/L*hr   Current Dose / Frequency: 1000 mg IVx1; then 1250 every 18 hours   Plan / Rationale:   o Expected AUC = 440 mg/L.hr and trough of 10.9 mg/L   Will continue to monitor clinical condition and make adjustments to regimen as appropriate. Thank you for consulting Pharmacy! Carl Laughlin, PharmD, BCPS      Interval update:     Subjective/Objective: Mr. Javy Kapoor is a [de-identified] y.o. male with a PMHx significant for CAD s/p CABG (2013), NM disorder with spasticity, BPH, neurogenic bladder with chronic indwelling cathter, admitted for Urinary tract infection. Pharmacy has been consulted to dose vancomycin. Ht Readings from Last 1 Encounters:   06/28/22 5' 11\" (1.803 m)     Wt Readings from Last 1 Encounters:   06/29/22 222 lb 3.6 oz (100.8 kg)       Current & Prior Antimicrobial Regimen(s):   Cefepime 2000 mg every 12 hours   Vancomycin 1500 mg IVx1; then 1250 mg every 18 hours    Level(s) / Doses:    Date Time Dose Level / Type of Level Interpretation                 Note: Serum levels collected for AUC-based dosing may be high if collected in close proximity to the dose administered. This is not necessarily indicative of toxicity. Cultures & Sensitivities:    Date Site Micro Susceptibility / Result   6/28/22 Urine pending            Labs / Ancillary Data:    Estimated Creatinine Clearance: 102 mL/min (A) (based on SCr of 0.7 mg/dL (L)). Recent Labs     06/28/22  2149   CREATININE 0.7*   BUN 24*   WBC 7.2       Additional Lab Values / Findings of Note:    Procalcitonin: No results for input(s): PROCAL in the last 72 hours.

## 2022-06-29 NOTE — PROGRESS NOTES
Clinical Pharmacy Progress Note    IV Vancomycin - Management by Pharmacy    Consult Date(s): 6/29/22  Consulting Provider(s): Dr. Mariluz Snell. Assessment / Plan    UTI - Vancomycin   Concurrent Antimicrobials: Cefepime    Day of Vanc Therapy / Ordered Duration: #1   Current Dosing Method: Intermittent Dosing by Levels   Therapeutic Goal: <500 mg/L*hr   Current Dose / Frequency: 1250 mg every 18 hours   Plan / Rationale:   o Predicted AUC = 381 mg/L.hr and trough of 8.6 mg/L with current dose.  o Scr is 0.6 mg/dL today (Baseline 0.8-0.9 mg/dL). o Patient has hx of Vanc dosing with [address] [place] [address] team - was previously on 750 mg Q12H regimen that predicted an AUC of ~560 mg/L*hr and a trough of 18.3 mg/L.  o Will change dose to 750 mg Q12H based on patients hx with Vancomycin. o Get random level on 06/30 at 1200 to assess kinetics.  Will continue to monitor clinical condition and make adjustments to regimen as appropriate. Thank you for consulting Pharmacy! Janice Anders  PharmD Candidate 2023 6/29/2022  8:34 AM      Subjective/Objective: Mr. Alex Hdez is a [de-identified] y.o. male with a PMHx significant for CAD s/p CABG (2013), NM disorder with spasticity, BPH, neurogenic bladder with chronic indwelling cathter, and MDRO UTI's who is admitted for Urinary tract infection. Patient has history of Vancomycin dosing with the [facility] clinical pharmacy team for left MT Osteomyelitis in May. Pharmacy has been consulted to dose vancomycin. Ht Readings from Last 1 Encounters:   06/28/22 5' 11\" (1.803 m)     Wt Readings from Last 1 Encounters:   06/29/22 222 lb 3.6 oz (100.8 kg)       Current & Prior Antimicrobial Regimen(s):   Cefepime 2000 mg every 12 hours (06/29 - Current)   Vancomycin - pharmacy to dose  o 1500 mg IVx1 (06/29)  o 750 mg every 12 hours (06/29 - Current)     Level(s) / Doses:    Date Time Dose Level / Type of Level Interpretation   06/30 1200 750 mg Q12H Random = ?            Note: Serum levels collected for AUC-based dosing may be high if collected in close proximity to the dose administered. This is not necessarily indicative of toxicity. Cultures & Sensitivities:    Date Site Micro Susceptibility / Result   6/28/22 Urine pending          **Patient grew MDR proteus in April 2022    Labs / Ancillary Data:    Estimated Creatinine Clearance: 119 mL/min (A) (based on SCr of 0.6 mg/dL (L)). Recent Labs     06/28/22  2149 06/29/22  0616   CREATININE 0.7* 0.6*   BUN 24* 23*   WBC 7.2  --        Additional Lab Values / Findings of Note:    Procalcitonin: No results for input(s): PROCAL in the last 72 hours.

## 2022-06-29 NOTE — ED PROVIDER NOTES
alcohol and does not use drugs. Medications     Previous Medications    ALBUTEROL SULFATE HFA (PROVENTIL HFA) 108 (90 BASE) MCG/ACT INHALER    Inhale 2 puffs into the lungs every 6 hours as needed for Wheezing or Shortness of Breath    ASPIRIN 81 MG CHEWABLE TABLET    Take 1 tablet by mouth daily    ATORVASTATIN (LIPITOR) 80 MG TABLET    Take 80 mg by mouth nightly. BALSAM PERU-CASTOR OIL (VENELEX) OINT OINTMENT    Apply topically daily as needed    DICYCLOMINE (BENTYL) 20 MG TABLET    Take 20 mg by mouth 3 times daily as needed    DOCUSATE SODIUM (COLACE) 100 MG CAPSULE    Take 100 mg by mouth daily     FERROUS SULFATE (IRON 325) 325 (65 FE) MG TABLET    Take 1 tablet by mouth 2 times daily    GABAPENTIN (NEURONTIN) 600 MG TABLET    Take 600 mg by mouth 2 times daily. GUAIFENESIN (MUCINEX) 600 MG EXTENDED RELEASE TABLET    Take 1 tablet by mouth 2 times daily as needed for Congestion    LEVOTHYROXINE (SYNTHROID) 75 MCG TABLET    Take 1 tablet by mouth Daily    LOPERAMIDE (IMODIUM) 2 MG CAPSULE    Take 2 mg by mouth 4 times daily as needed for Diarrhea    MELATONIN 5 MG CAPS    Take 1 capsule by mouth nightly    NUTRITIONAL SUPPLEMENTS (ENSURE HIGH PROTEIN) LIQD    Take 1 Can by mouth 2 times daily    NUTRITIONAL SUPPLEMENTS (RONEY PO)    Take 1 packet by mouth 2 times daily    PANTOPRAZOLE (PROTONIX) 40 MG TABLET    Take 1 tablet by mouth every morning (before breakfast)    TAMSULOSIN (FLOMAX) 0.4 MG CAPSULE    Take 0.8 mg by mouth daily     TRAZODONE (DESYREL) 50 MG TABLET    Take 25 mg by mouth nightly    VITAMIN B-12 (CYANOCOBALAMIN) 1000 MCG TABLET    Take 1,000 mcg by mouth daily    WOUND DRESSINGS (MEPILEX EX)    Apply topically Cleanse skin graft areas with normal saline, pat dry, apply mepilex foam board, change every 3 days as needed. Allergies     He is allergic to bactrim [sulfamethoxazole-trimethoprim].     Physical Exam     INITIAL VITALS: BP: (!) 150/68, Temp: 98.7 °F (37.1 °C), Heart Rate: 62, Resp: 18, SpO2: 95 %   Physical Exam  General: Well appearing in NAD  HEENT:  head is atraumatic, sclera are clear, oropharynx is nonerythematous, mucus membranes are moist  Neck: Trachea midline  Chest: Nonlabored respirations, clear to auscultation bilaterally  Cardiovascular: Regular rate  and rhythm, 2+ radial pulses bilaterally  Abdominal: Nondistended abdomen, soft, nontender without rebound or gaurding  Skin: Warm, dry well perfused, no rashes  Extremities: no obvious deformities, no tenderness to palpation diffusely  Neurologic:  Alert and oriented, speech is clear and intact without dysarthria  Psychologic: appropriate mood and affect   Diagnostic Results     EKG   None     RADIOLOGY:  No orders to display       LABS:   Results for orders placed or performed during the hospital encounter of 06/28/22   CBC with Auto Differential   Result Value Ref Range    WBC 7.2 4.0 - 11.0 K/uL    RBC 3.74 (L) 4.20 - 5.90 M/uL    Hemoglobin 11.4 (L) 13.5 - 17.5 g/dL    Hematocrit 35.9 (L) 40.5 - 52.5 %    MCV 96.0 80.0 - 100.0 fL    MCH 30.5 26.0 - 34.0 pg    MCHC 31.8 31.0 - 36.0 g/dL    RDW 19.9 (H) 12.4 - 15.4 %    Platelets 533 406 - 626 K/uL    MPV 9.3 5.0 - 10.5 fL    Neutrophils % 67.1 %    Lymphocytes % 16.5 %    Monocytes % 5.4 %    Eosinophils % 9.9 %    Basophils % 1.1 %    Neutrophils Absolute 4.8 1.7 - 7.7 K/uL    Lymphocytes Absolute 1.2 1.0 - 5.1 K/uL    Monocytes Absolute 0.4 0.0 - 1.3 K/uL    Eosinophils Absolute 0.7 (H) 0.0 - 0.6 K/uL    Basophils Absolute 0.1 0.0 - 0.2 K/uL   Urinalysis   Result Value Ref Range    Color, UA Yellow Straw/Yellow    Clarity, UA Clear Clear    Glucose, Ur Negative Negative mg/dL    Bilirubin Urine Negative Negative    Ketones, Urine Negative Negative mg/dL    Specific Gravity, UA >=1.030 1.005 - 1.030    Blood, Urine SMALL (A) Negative    pH, UA 6.0 5.0 - 8.0    Protein, UA 30 (A) Negative mg/dL    Urobilinogen, Urine 0.2 <2.0 E.U./dL    Nitrite, Urine POSITIVE (A) Negative    Leukocyte Esterase, Urine MODERATE (A) Negative    Microscopic Examination YES     Urine Type Voided        ED BEDSIDE ULTRASOUND:    RECENT VITALS:  BP: (!) 150/68,Temp: 98.7 °F (37.1 °C), Heart Rate: 62, Resp: 18, SpO2: 95 %     Procedures       ED Course     Nursing Notes, Past Medical Hx, Past Surgical Hx, Social Hx,Allergies, and Family Hx were reviewed. patient was given the following medications:  No orders of the defined types were placed in this encounter. CONSULTS:  None    MEDICAL DECISIONMAKING / ASSESSMENT / PLAN     José Antonio Ratliff is a [de-identified] y.o. male history of indwelling Iglesias catheter with MDRO UTI, presenting today with concern for UTI. UA was positive for nitrate and leukocytes. Given this and history of MDR Proteus will start on cefepime. Will be signed out pending further laboratory evaluation and evaluation for possible admission by Dr Alireza Gotti.       Nelli Gamez MD  06/28/22 1297

## 2022-06-29 NOTE — PROGRESS NOTES
Physician Progress Note      Guru MURRAY #:                  [id number]  :                       1940  ADMIT DATE:       2022 9:16 PM  [address] [place] DATE:  RESPONDING  PROVIDER #:        Marbella Javier MD          QUERY TEXT:    Pt admitted with UTI. Pt noted to have chronic indwelling urinary catheter. If possible, please document in the progress notes and discharge summary if   you are evaluating and/or treating any of the following: The medical record reflects the following:  Risk Factors: [de-identified] y.o. male with Neurogenic bladder, chronic schneider since 2019,   BPH  Clinical Indicators: c/o pain around schneider catheter site while passing urine   since day before admission. UA with 10-20 WBC, positive nitrite, moderate LE   and 4+ bacteria  Treatment: exchanged existing schneider catheter, Vanc, Cefepime  Options provided:  -- UTI likely r/t chronic indwelling urinary catheter  -- UTI not due to indwelling urinary catheter  -- Other - I will add my own diagnosis  -- Disagree - Not applicable / Not valid  -- Disagree - Clinically unable to determine / Unknown  -- Refer to Clinical Documentation Reviewer    PROVIDER RESPONSE TEXT:    UTI likely r/t the chronic indwelling urinary catheter.     Query created by: Froilan Colon on 2022 1:06 PM      Electronically signed by:  Marbella Javier MD 2022 1:10 PM

## 2022-06-29 NOTE — ED TRIAGE NOTES
Patient arrived in the ER with c/o pain with urination. Patient states that the pain started about 3:00 pm today. Patient states that he has a hx of UTI.

## 2022-06-29 NOTE — DISCHARGE INSTR - COC
Continuity of Care Form    Patient Name: Stormy Perez   :  1940  MRN:  [id number]    Admit date:  2022  Discharge date:  2022    Code Status Order: Full Code   Advance Directives:      Admitting Physician:  Lamar Jimenez MD  PCP: Tanvir Hancock    Discharging Nurse: Odetta Cooks. [address] Unit/Room#: 3306/3306-01  Discharging Unit Phone Number: [phone]    Emergency Contact:   Extended Emergency Contact Information  Primary Emergency Contact: [address], [address] Phone: [phone]  Relation: Child  Secondary Emergency Contact: [facility] Phone: [phone]  Work Phone: [phone]  Mobile Phone: [phone]  Relation: Child    Past Surgical History:  Past Surgical History:   Procedure Laterality Date    BACK SURGERY      lower lumbar    CORONARY ARTERY BYPASS GRAFT  2013    CABG x 5 (Dr Charly Howell), svg to diag, om1 and 3, distal rca, kelly to lad.      CYSTOSCOPY N/A 1/10/2019    CYSTOSCOPY performed by Mihaela Tran MD at [address] Left 2022    LEFT FOOT PARTIAL FIFTH RAY RESECTION WITH EXCISIONAL DEBRIDEMENT OF MUSCLE AND FASCIA, WITH APPLICATION OF GRAFT performed by Ariel Owens DPM at [address] [place] [address] Right 2015    ORIF    LEG SURGERY Right 2021    RIGHT LOWER EXTREMITY ADVANCEMENT FLAP AND SPLIT THICKNESS SKIN GRAFT PLACEMENT; (WOUND- 10 CM X 5.5 CM; CLOSURE- 6 CM X 5.5 CM; SKIN GRAFT- 7.2 CM X 5 CM) performed by Jeniffer Simms MD at [address] [place] [address] 2020    [address] [place] [address] performed by Shivani Iraheta MD at [place] [address] 2020    EGD BIOPSY performed by Shivani Iraheta MD at [facility] ENDOSCOPY       Immunization History:   Immunization History   Administered Date(s) Administered    COVID-19, MODERNA BLUE border, Primary or Immunocompromised, (age 12y+), IM, 100 mcg/0.5mL 2021    Influenza A ([id number]) Vaccine PF IM 12/10/2009    Influenza Vaccine, unspecified formulation 12/13/2012, 11/06/2014, 10/13/2015, 09/01/2016, 11/03/2017, 10/26/2018, 09/20/2019    Influenza Virus Vaccine 12/19/2005, 12/14/2006, 12/13/2012, 11/30/2013, 10/13/2015, 09/20/2019    Influenza Whole 10/01/2007, 09/02/2010, 09/01/2011, 12/13/2012, 10/13/2015    Influenza, High Dose (Fluzone 65 yrs and older) 11/06/2014, 10/20/2020    Influenza, Quadv, IM, PF (6 mo and older Fluzone, Flulaval, Fluarix, and 3 yrs and older Afluria) 11/04/2021    PPD Test 09/16/1997    Pneumococcal Conjugate 13-valent (Mpejjpy64) 12/29/2014    Pneumococcal Conjugate 7-valent (Prevnar7) 12/29/2014    Pneumococcal Polysaccharide ([id number]) 01/27/2014       Active Problems:  Patient Active Problem List   Diagnosis Code    Hypotension I95.9    Light headed R42    Cellulitis L03.90    Essential hypertension I10    GERD (gastroesophageal reflux disease) K21.9    Acute blood loss anemia D62    Tinea corporis B35.4    Carotid stenosis I65.29    CAD (coronary artery disease) I25.10    S/P CABG 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 XXXA    Complicated UTI (urinary tract infection) N39.0    Edema R60.9    Problem with urinary catheter ([facility] [address] T83. 9XXA    Acute encephalopathy G93.40    Chronic indwelling Iglesias catheter Z97.8    Hyperlipidemia E78.5    Bilateral lower leg cellulitis L03.116, L03.115    Neurogenic bladder N31.9    Bacteriuria R82.71    Abnormality of urethral meatus Q64.70    Gross hematuria R31.0    CHF with unknown LVEF ([facility]) I50.9    Dyspnea R06.00    Bradycardia R00.1    Pseudomonas infection A49.8    Acute renal injury ([address] N17.9    Cellulitis of scrotum N49.2    Constipation K59.00    Encopresis with constipation and overflow incontinence R15.9    Abnormal stress test R94.39    H/O angiography Z92.89    Abnormal angiogram R93.89    Acute cystitis with hematuria N30.01    Acute renal failure superimposed on stage 3 chronic kidney disease (HCC) N17.9, N18.30    Urinary tract infection associated with indwelling urethral catheter ([facility]) T83.511A, N39.0    Encephalopathy acute G93.40    Altered mental status R41.82    Hyperkalemia E87.5    Leg laceration, unspecified laterality, initial encounter S81.819A    Pain of right lower extremity M79.604    Degloving injury T14. 8XXA    Weakness R53.1    Symptomatic bradycardia R00.1    Bilateral leg edema R60.0    Symptomatic sinus bradycardia R00.1    Multiple drug resistant organism (MDRO) culture positive Z16.24    Osteomyelitis ([facility]) M86.9    Chronic multifocal osteomyelitis of right foot ([facility]) M86.371    MRSA infection A49.02    Septic arthritis of interphalangeal joint of toe of right foot ([facility]) M00.9    Elevated sed rate R70.0    Elevated C-reactive protein (CRP) R79.82    Infection requiring contact isolation precautions B99.9    Class 1 obesity due to excess calories with body mass index (BMI) of 30.0 to 30.9 in adult E66.09, Z68.30    Weight loss counseling, encounter for Z71.3    UTI (urinary tract infection) N39.0       Isolation/Infection:   Isolation            Contact          Patient Infection Status       Infection Onset Added Last Indicated Last Indicated By Review Planned Expiration Resolved Resolved By    MRSA 05/25/22 05/27/22 05/25/22 Culture, Wound        MDRO (multi-drug resistant organism) 04/22/22 04/24/22 04/22/22 Culture, Urine        Resolved    C-diff (Clostridium difficile) 04/11/22 04/12/22 04/11/22 C. difficile toxin Molecular   05/26/22 Ciro Correa, RN    Pt has had no inpatient admissions in past 30 days (diagnosed on 4/11 and discharged 4/25), had been off antibiotics greater than 48 hrs (and had them restarted 5/25/22 upon admission) at the 30-day nuris (5/25 prior to them being started    C-diff Rule Out 04/11/22 04/12/22 04/11/22 C. difficile toxin Molecular (Ordered)   22 Rule-Out Test Resulted    C-diff Rule Out 22 Clostridium Difficile Toxin/Antigen (Ordered)   22 Rule-Out Test Resulted    INFLUENZA 22 Rapid influenza A/B antigens   22     COVID-19 (Rule Out) 22 COVID-19, Rapid (Ordered)   22 Rule-Out Test Resulted    COVID-19 (Rule Out) 22 COVID-19, Rapid (Ordered)   22 Rule-Out Test Resulted    COVID-19 (Rule Out) 22 COVID-19, Rapid (Ordered)   22 Rule-Out Test Resulted    MDRO (multi-drug resistant organism) 21 Culture, Urine   21 Irlanda Correa RN    New urine cx on 2021 with no growth/was negative    COVID-19 (Rule Out) 21 COVID-19, Rapid (Ordered)   21 Rule-Out Test Resulted    C-diff Rule Out 21 GI Bacterial Pathogens By PCR (Ordered)   21 Piotr Romo RN    Order noted to be discontinued by MD; not included in GI pathogens order    COVID-19 (Rule Out) 21 COVID-19, Rapid (Ordered)   21 Rule-Out Test Resulted    C-diff Rule Out 20 Clostridium difficile toxin/antigen (Ordered)   21 Ban Martinez RN    No stool ever sent.      COVID-19 (Rule Out) 06/10/20 06/10/20 06/10/20 COVID-19 (Ordered)   06/10/20 Rule-Out Test Resulted    C-diff Rule Out 20 GI Bacterial Pathogens By PCR (Ordered)   06/10/20 Rule-Out Test Resulted    C-diff Rule Out 20 Clostridium difficile toxin/antigen (Ordered)   20 Rule-Out Test Resulted    C-diff Rule Out 20 C. difficile toxin Molecular (Ordered)   20 Rule-Out Test Resulted    MDRO (multi-drug resistant organism) 20 Culture, Urine   21 Irlanda Correa, RN    Has had new urine culture on 4/17/21 with mixed anne; no MDRO noted    COVID-19 (Rule Out) 05/03/20 05/03/20 05/03/20 COVID-19 (Ordered)   05/03/20 Rule-Out Test Resulted    C-diff Rule Out 05/02/20 05/02/20 05/03/20 Clostridium difficile toxin/antigen (Ordered)   05/03/20 Rule-Out Test Resulted    MRSA 02/07/20 02/08/20 02/07/20 MRSA DNA Probe, Nasal   05/04/20 Jamal Hinkle    MDRO (multi-drug resistant organism) 10/26/19 10/28/19 11/19/19 Urine culture   01/16/20 Adan Zendejas            Nurse Assessment:  Last Vital Signs: /66   Pulse 67   Temp 98 °F (36.7 °C) (Oral)   Resp 16   Ht 5' 11\" (1.803 m)   Wt 222 lb 3.6 oz (100.8 kg)   SpO2 93%   BMI 30.99 kg/m²     Last documented pain score (0-10 scale): Pain Level: 10  Last Weight:   Wt Readings from Last 1 Encounters:   06/29/22 222 lb 3.6 oz (100.8 kg)     Mental Status:  alert    IV Access:  - None    Nursing Mobility/ADLs:  Walking   Dependent  Transfer  Dependent  Bathing  Dependent  Dressing  Dependent  Toileting  Dependent  Feeding  Dependent  Med Admin  Dependent  Med Delivery   whole    Wound Care Documentation and Therapy:  Negative Pressure Wound Therapy Leg Anterior; Lower;Right (Active)   Number of days: 239       Wound 11/01/21 Buttocks Right;Left redness, open areas, stage 2 ulcer (Active)   Number of days: 240       Wound 01/27/22 Sacrum Inner;Medial;Left;Right (Active)   Number of days: 153       Wound 01/27/22 Pretibial Right (Active)   Number of days: 153       Wound 01/27/22 Elbow Right;Posterior (Active)   Number of days: 153       Wound 05/26/22 Sacrum Medial (Active)   Wound Image   05/26/22 1148   Wound Etiology Pressure Stage 2 06/02/22 1115   Dressing Status Other (Comment) 06/02/22 0326   Wound Cleansed Soap and water 06/02/22 1115   Dressing/Treatment Zinc paste 06/02/22 1115   Dressing Change Due 05/29/22 05/30/22 1745   Wound Length (cm) 3 cm 05/26/22 1148   Wound Width (cm) 3 cm 05/26/22 1148   Wound Depth (cm) 0.2 cm 05/26/22 1148   Wound Surface Area (cm^2) 9 cm^2 05/26/22 1148   Wound Volume (cm^3) 1.8 cm^3 05/26/22 1148   Wound Assessment Pink/red;Slough 06/02/22 0326   Drainage Amount None 06/02/22 1535   Drainage Description Serosanguinous 06/01/22 1900   Odor None 06/01/22 1900   Usha-wound Assessment Blanchable erythema 05/31/22 1715   Margins Defined edges 06/02/22 0747   Number of days: 34       Incision 05/29/22 Foot Anterior; Left (Active)   Dressing Status Clean;Dry; Intact 06/02/22 1535   Drainage Amount None 06/02/22 1535   Number of days: 31       Incision 11/02/21 Thigh Anterior;Right (Active)   Number of days: 239        Elimination:  Continence: Bowel: Yes  Bladder: No  Urinary Catheter: Insertion Date: 06/28/2022    Colostomy/Ileostomy/Ileal Conduit: No       Date of Last BM: 07/08/2022    Intake/Output Summary (Last 24 hours) at 6/29/2022 1839  Last data filed at 6/29/2022 1713  Gross per 24 hour   Intake 282.99 ml   Output 1035 ml   Net -752.01 ml     I/O last 3 completed shifts: In: 37 [IV Piggyback:43]  Out: 700 [Urine:700]    Safety Concerns: At Risk for Falls    Impairments/Disabilities:      None    Nutrition Therapy:  Current Nutrition Therapy:   - Oral Diet:  Renal    Routes of Feeding: Oral  Liquids: Thin Liquids  Daily Fluid Restriction: no  Last Modified Barium Swallow with Video (Video Swallowing Test): not done    Heart Failure Instructions for Daily Management  Patient was treated for chronic diastolic heart failure. he  will require the following:    Please weigh daily on the same scale and approximately the same time of day. Report weight gain of 3 pounds/day or 5 pounds/week [facility] [phone]. Please use hospital discharge weight as baseline reference.    Please monitor for signs and symptoms of and report to MD:  Worsening Heart Failure: sudden weight gain, shortness of breath, lower extremity or general edema/swelling, abdominal bloating/swelling, inability to lie flat, intolerance to usual activity, or cough (especially at night). Report these finding even if no increase in weight. Dehydration:  having difficulty or a decrease in urination, dizziness, worsening fatigue, or new onset/worsening of generalized weakness. Please continue a LOW SODIUM diet and LIMIT fluid intake to 48 - 64 ounces ( 1.5 - 2 liters) per day. Call [facility] / [address] [place] [address] [phone] with any questions or concerns. Please continue heart failure education to patient and family/support system. See After Visit Summary for hospital follow up appointment details. Consider spiritual care referral for support and/or completion of advance directives . Consider: [place] [address] telehealth program if patient agreeable and able to participate. Patient's primary cardiologist is DR Audrey Adkins        Treatments at the Time of Hospital Discharge:   Respiratory Treatments: NA  Oxygen Therapy:  is not on home oxygen therapy.   Ventilator:    - No ventilator support    Rehab Therapies: Physical Therapy and Occupational Therapy  Weight Bearing Status/Restrictions: No weight bearing restrictions  Other Medical Equipment (for information only, NOT a DME order):  hospital bed  Other Treatments: N A    Patient's personal belongings (please select all that are sent with patient):  None    RN SIGNATURE:  Electronically signed by Cruzito Del Angel RN on 7/8/22 at 6:43 PM EDT    CASE MANAGEMENT/SOCIAL WORK SECTION    Inpatient Status Date: 6-29-22    Readmission Risk Assessment Score:  Readmission Risk              Risk of Unplanned Readmission:  38           [facility] [address] [facility] [place], [place]  Phone: [phone] Fax: [phone]      [facility] refills patient's Baclofen pain pump  # [phone]      / signature: Electronically signed by Patrick Back ALPHONSO Carranza on 6/30/22 at 2:55 PM EDT    PHYSICIAN SECTION    Prognosis: {Prognosis:[id number]}    Condition at Discharge: [address] Vivien Murri[place] Patient Condition:[id number]}    Rehab Potential (if transferring to Rehab): {Prognosis:8471004933}    Recommended Labs or Other Treatments After Discharge: ***    Baclofen pump is due to refill on Friday 7/8/22. Monitor BP  Follow up with PCP at facility in 1 week  Apply antifungal cream to upper thighs and powder to groin region  Fluconazole weekly for 3 more weeks  PT OT  Iglesias care  Daily weights  BMP in 1 week    Podiatry Wound Care Discharge Instructions  Please perform every other day dressing changes to bilateral lower extremity as follows  -Apply adaptic to the wound on the left foot and bilateral lower leg  -Next apply gauze to the top of the bilateral foot, ankle, and leg  -Next loosely wrap the bilateral lower extremity with Kerlix starting from just in front of the toes and ending just below the knee  -Next gently wrap the bilateral foot with 4 inch Ace bandage starting from just in front of the toes and ending just above the ankle  -Next gently wrap the bilateral leg with 6 inch Ace bandage starting from just above the ankle and ending just below the right knee  Patient is full- weightbearing Bilateral lower extremity  Please follow-up with Dr. Barbara Manley DPM for follow up evaluation      Physician Certification: I certify the above information and transfer of Carlota Stalls  is necessary for the continuing treatment of the diagnosis listed and that he requires Assisted Living for greater 30 days.      Update Admission H&P: No change in H&P    PHYSICIAN SIGNATURE:  Electronically signed by Jaye Cherry MD - Isaiah Hoff MD on 7/7/22 at 1:44 PM EDT

## 2022-06-29 NOTE — CONSULTS
Department of Podiatry Consult Note  Resident       Reason for Consult: LE osteomyelitis s/p L partial 5th ray resection   Requesting Physician:  Claribel Espinoza MD    CHIEF COMPLAINT:  S/p partial 5th ray resection w/ graft application, left foot    HISTORY OF PRESENT ILLNESS:      The patient is a [de-identified] y/o male with significant past medical history as listed below. Patient was admitted for a UTI and podiatry was consulted for a partial 5th ray resection w/ graft application (DOS 6/13/5866). Patient never end following up with Dr. Anita Cook after he got discharged. Patient has not had any problems with his surgery site and says he has been doing a lot better. Patient denies any pain or drainage coming from his amputation site or other wounds on his legs. Patient denies any other pedal complaints. Patient denies any N/V/F/C/SoB. Past Medical History:        Diagnosis Date    BPH (benign prostatic hyperplasia)     C. difficile colitis 04/11/2022    Carotid stenosis 12/2013    JESU 82-42% stenosis; LICA 43-11% stenosis    Cellulitis 12/2013    LLE    CHF (congestive heart failure) (HCC)     Chronic back pain     Diverticular disease     Encounter for imaging to screen for metal prior to MRI 05/26/2022    [employer]: Synchromed II pump for baclofen -lfe    GERD (gastroesophageal reflux disease)     History of atrial fibrillation     Hypercholesteremia     Hypertension     Lower GI bleed     MDRO (multiple drug resistant organisms) resistance 10/26/2019    urine    Neuromuscular disorder (HCC)     spasticity    Renal insufficiency     Septic arthritis of interphalangeal joint of toe of right foot ([address] 5/27/2022    Vitamin B12 deficiency      Past Surgical History:        Procedure Laterality Date    BACK SURGERY  2006    lower lumbar    CORONARY ARTERY BYPASS GRAFT  12/13/2013    CABG x 5 (Dr Loli Bingham), svg to diag, om1 and 3, distal rca, kelly to lad.      CYSTOSCOPY N/A 1/10/2019    CYSTOSCOPY performed by Sera Fatima MD at [address] Left 5/29/2022    LEFT FOOT PARTIAL FIFTH RAY RESECTION WITH EXCISIONAL DEBRIDEMENT OF MUSCLE AND FASCIA, WITH APPLICATION OF GRAFT performed by Radha Munoz DPM at [facility] Right 5/1/2015    ORIF    LEG SURGERY Right 11/2/2021    RIGHT LOWER EXTREMITY ADVANCEMENT FLAP AND SPLIT THICKNESS SKIN GRAFT PLACEMENT; (WOUND- 10 CM X 5.5 CM; CLOSURE- 6 CM X 5.5 CM; SKIN GRAFT- 7.2 CM X 5 CM) performed by Monika Chan MD at [address] [place] [address] Avenue 6/11/2020    [facility] performed by Mary Amador MD at [facility] N/A 5/4/2020    EGD BIOPSY performed by Mary Amador MD at [facility] ENDOSCOPY     Current Medications:    Current Facility-Administered Medications: albuterol (PROVENTIL) nebulizer solution 2.5 mg, 2.5 mg, Nebulization, Q4H PRN  aspirin chewable tablet 81 mg, 81 mg, Oral, Daily  atorvastatin (LIPITOR) tablet 80 mg, 80 mg, Oral, Nightly  gabapentin (NEURONTIN) tablet 600 mg, 600 mg, Oral, BID  levothyroxine (SYNTHROID) tablet 75 mcg, 75 mcg, Oral, QAM AC  pantoprazole (PROTONIX) tablet 40 mg, 40 mg, Oral, QAM AC  tamsulosin (FLOMAX) capsule 0.8 mg, 0.8 mg, Oral, Daily  sodium chloride flush 0.9 % injection 5-40 mL, 5-40 mL, IntraVENous, 2 times per day  sodium chloride flush 0.9 % injection 5-40 mL, 5-40 mL, IntraVENous, PRN  0.9 % sodium chloride infusion, , IntraVENous, PRN  enoxaparin Sodium (LOVENOX) injection 30 mg, 30 mg, SubCUTAneous, BID  ondansetron (ZOFRAN-ODT) disintegrating tablet 4 mg, 4 mg, Oral, Q8H PRN **OR** ondansetron (ZOFRAN) injection 4 mg, 4 mg, IntraVENous, Q6H PRN  polyethylene glycol (GLYCOLAX) packet 17 g, 17 g, Oral, Daily PRN  acetaminophen (TYLENOL) tablet 650 mg, 650 mg, Oral, Q6H PRN **OR** acetaminophen (TYLENOL) suppository 650 mg, 650 mg, Rectal, Q6H PRN  nystatin (MYCOSTATIN) ointment, , Topical, BID  traZODone (DESYREL) tablet 25 mg, 25 mg, Oral, Nightly  melatonin disintegrating tablet 5 mg, 5 mg, Oral, Nightly PRN  cefepime (MAXIPIME) 2000 mg IVPB minibag, 2,000 mg, IntraVENous, Q12H  vancomycin (VANCOCIN) 750 mg in dextrose 5 % 250 mL IVPB, 750 mg, IntraVENous, Q12H  Allergies:   Bactrim [sulfamethoxazole-trimethoprim]  Social History:    TOBACCO:   reports that he quit smoking about 52 years ago. He has never used smokeless tobacco.  Family History:       Problem Relation Age of Onset    Heart Disease Father      REVIEW OF SYSTEMS:      Review of Systems: Pertinent positive and negative findings as documented in the HPI, otherwise all other systems were reviewed and were negative. PHYSICAL EXAM:      Vitals:    BP (!) 161/69   Pulse 57   Temp 98.2 °F (36.8 °C) (Axillary)   Resp 18   Ht 5' 11\" (1.803 m)   Wt 222 lb 3.6 oz (100.8 kg)   SpO2 97%   BMI 30.99 kg/m²     LABS:   Recent Labs     06/28/22 2149   WBC 7.2   HGB 11.4*   HCT 35.9*        Recent Labs     06/28/22  2149 06/28/22  2334     --    K 5.1 4.6   *  --    CO2 21  --    BUN 24*  --    CREATININE 0.7*  --      No results for input(s): PROT, INR, APTT in the last 72 hours. VASCULAR: DP and PT pulses non-palpable B/L but upon doppler examination were found to be biphasic B/L. CFT is brisk to the digits of the foot b/l. Skin temperature is warm to warm from proximal to distal with no focal increase in temperature noted. No erythema noted but nonpitting edema noted. No pain with calf compression b/l. NEUROLOGIC: Gross and epicritic sensation is intact b/l. Protective sensation is diminished at all pedal sites b/l. DERMATOLOGIC: Diffuse dermatologic changes noted to b/l LE. Right lower extremity partial thickness and full thickness ulcerations from previous admission have mostly all epithelialized with no new open wounds, lesions, or lacerations noted with no acute signs of infection noted.  Small partial thickness ulceration noted to the lateral aspect of the right 5th digit that has a hyperkeratotic periwound but no acute signs of infection. Patient provided verbal consent for pictures obtained today:             Left lower extremity has sutures intact with skin edges well coapted. No signs of gapping or dehiscence noted. Graft has incorporated well with no hematoma or seroma formation. Patient provided verbal consent for pictures obtained today:               MUSCULOSKELETAL: No pain to palpation of the foot or ankle b/l. Muscle strength 5/5 for all pedal muscle groups. Ankle joint ROM is decreased in dorsiflexion with the knee extended. No obvious biomechanical abnormalities. IMAGING:    XR Foot Left    IMPRESSION/RECOMMENDATIONS:      1. S/p left foot partial 5th ray resection with graft application (DOS 66/01/0283)  2. Partial thickness ulceration, lateral right 5th digit ([address])  3. Peripheral Neuropathy, B/L LE    -Patient examined and evaluated at bedside   -VSS, No AM CBC but no leukocytosis noted on 6/28 (WBC 7.2)  -CRP <3, ESR 14  -Prealbumin pending, Hemoglobin A1c 5.7 (6/21)  -Wound culture not taken as no open wounds or active drainage noted. -Imaging ordered  -Using a  and #15 blade, all sutures were removed w/o incident  -Dressing applied to bilatleral LE consisting of adaptic DSD  -No Abx necessary from podiatric standpoint   -Patient is to remain WBAT to B/L LE    Dispo: Patient s/p left foot partial 5th ray resection with graft application. Will follow up on prealbumin. No antibiotics necessary from podiatric standpoint. Patient is to remain WBAT to B/L lower extremities. Podiatry will continue to follow while patient is in house. - The patient was seen and evaluated at bedside with Dr. Lizy Hill, [address] [place] [address]  Podiatric Resident, PGY-1  Pager #: [phone] or Perfect Serve     Patient was seen and evaluated at bedside.   Agree with residents assessment and treatment plan.   Meena Castillo DPM

## 2022-06-29 NOTE — PLAN OF CARE
Problem: Chronic Conditions and Co-morbidities  Goal: Patient's chronic conditions and co-morbidity symptoms are monitored and maintained or improved  Outcome: Progressing     Problem: Pain  Goal: Verbalizes/displays adequate comfort level or baseline comfort level  Outcome: Progressing     Problem: Skin/Tissue Integrity  Goal: Absence of new skin breakdown  Description: 1. Monitor for areas of redness and/or skin breakdown  2. Assess vascular access sites hourly  3. Every 4-6 hours minimum:  Change oxygen saturation probe site  4. Every 4-6 hours:  If on nasal continuous positive airway pressure, respiratory therapy assess nares and determine need for appliance change or resting period.   Outcome: Progressing     Problem: Safety - Adult  Goal: Free from fall injury  Outcome: Progressing     Problem: ABCDS Injury Assessment  Goal: Absence of physical injury  Outcome: Progressing

## 2022-06-29 NOTE — PROGRESS NOTES
Patient received to room 3306 from ED. Patient A&Ox4 upon arrival. VSS. Patient oriented to room, staff, and call system. Educated on fall protocol and hourly rounding. Assessment as documented. IVF infusing. Bed locked in low position. Patient informed to utilize call light with any needs. Pt verbalized understanding. Call light within reach. Will continue to monitor.

## 2022-06-29 NOTE — DISCHARGE SUMMARY
INTERNAL MEDICINE DEPARTMENT AT [address] [place] [address]  DISCHARGE SUMMARY    Patient ID: Toribio Frazier [facility]                                             Discharge Date: 7/7/2022   Patient's PCP: Sunil Villa                                          Discharge Physician: Ran Black MD   Admit Date: 6/28/2022   Admitting Physician: Denis Hatfield MD    DISCHARGE DIAGNOSES:  Hospital Problems           Last Modified POA    * (Principal) UTI (urinary tract infection) 6/29/2022 Yes    Electrolyte imbalance 8/1/0455 Yes    Systolic congestive heart failure ([address] 7/5/2022 Yes        Hospital Course:      Radha Monte is a [de-identified] y.o. male h/o CAD s/p CABG (2013), NM disorder with spasticity, LE cellulitis, Proteus MDRO UTI in 4/21, BPH and neurogenic bladder with chronic indwelling cathter in 2019 who presented to the ED from his assisted living facility with complaints of pain around his foleys catheter site while passing urine. UA in ED was significant for UTI. Pt was admitted for complicated UTI. Initially started on vanc and cefepime. Podiatry was consulted to his chronic LE wounds. UCx grew klebsiella. Pt completed the IV antibiotics course. Physical Exam:  BP (!) 100/58   Pulse 67   Temp 97.5 °F (36.4 °C) (Oral)   Resp 16   Ht 5' 11\" (1.803 m)   Wt 218 lb 7.6 oz (99.1 kg)   SpO2 94%   BMI 30.47 kg/m²   General appearance: alert, appears stated age and cooperative  Head: Normocephalic, without obvious abnormality, atraumatic  Eyes: conjunctivae/corneas clear. PERRL, EOM's intact. Fundi benign. Ears: normal TM's and external ear canals both ears  Nose: Nares normal. Septum midline. Mucosa normal. No drainage or sinus tenderness.   Throat: lips, mucosa, and tongue normal; teeth and gums normal  Neck: no adenopathy, no carotid bruit, no JVD, supple, symmetrical, trachea midline and thyroid not enlarged, symmetric, no tenderness/mass/nodules  Lungs: clear to auscultation bilaterally  Heart: regular rate and rhythm, Liqd     levothyroxine 75 MCG tablet  Commonly known as: SYNTHROID  Take 1 tablet by mouth Daily     loperamide 2 MG capsule  Commonly known as: IMODIUM     Melatonin 5 MG Caps     MEPILEX EX     pantoprazole 40 MG tablet  Commonly known as: PROTONIX  Take 1 tablet by mouth every morning (before breakfast)     tamsulosin 0.4 MG capsule  Commonly known as: FLOMAX     traZODone 50 MG tablet  Commonly known as: DESYREL     Venelex Oint ointment     vitamin B-12 1000 MCG tablet  Commonly known as: CYANOCOBALAMIN         * This list has 2 medication(s) that are the same as other medications prescribed for you. Read the directions carefully, and ask your doctor or other care provider to review them with you.                Where to Get Your Medications      Information about where to get these medications is not yet available    Ask your nurse or doctor about these medications  · fluconazole 150 MG tablet  · furosemide 40 MG tablet  · miconazole 2 % cream  · miconazole 2 % powder       Activity: Pt is bed bound  Diet: regular diet  Wound Care: as directed    Time Spent on discharge is more than 30 minutes    Signed:  Kevin Alvarado MD, PGY1  7/7/2022

## 2022-06-29 NOTE — H&P
Internal Medicine  PGY 1  History & Physical      CC  Dysuria     History Obtained From:  patient    HISTORY OF PRESENT ILLNESS:  Shaunna Mcdaniels is a [de-identified]years old male with PMH of CAD s/p CABG (2013), NM disorder with spasticity, LE cellulitis, Proteus MDRO UTI in 4/21, BPH and neurogenic bladder with chronic indwelling cathter in 2019 who presented to the ED from his assisted living facility with complaints of pain around his foleys catheter site while passing urine since yesterday afternoon. Patient states that he was having pain in the suprapubic region along with pain and burning while passing urine. He denies any fevers, chills, shortness of breath, abdominal pain, nausea, vomiting or chest pain. Patient has had multiple UTIs in the past. Of note he was recently admitted 5/25 with left MT osteomyelitis s/p L partial ray resection and was treated with vanc, cefepime and daptomycin and was later discharged on linezolid. On arrival to the ED, vitals were stable and is afebrile, labs showed Cr 0.7, BUN 24, WBC 7.2, UA with 10-20 WBC, positive nitrite, moderate LE and 4+ bacteria. Patient was given a dose of cefepime in the ED and was admitted to the hospital for further treatment.      Past Medical History:        Diagnosis Date    BPH (benign prostatic hyperplasia)     C. difficile colitis 04/11/2022    Carotid stenosis 12/2013    JESU 76-09% stenosis; LICA 68-53% stenosis    Cellulitis 12/2013    LLE    CHF (congestive heart failure) (HCC)     Chronic back pain     Diverticular disease     Encounter for imaging to screen for metal prior to MRI 05/26/2022    [employer]: Synchromed II pump for baclofen -lfe    GERD (gastroesophageal reflux disease)     History of atrial fibrillation     Hypercholesteremia     Hypertension     Lower GI bleed     MDRO (multiple drug resistant organisms) resistance 10/26/2019    urine    Neuromuscular disorder (HCC)     spasticity    Renal insufficiency     Septic arthritis of interphalangeal joint of toe of right foot ([address] 5/27/2022    Vitamin B12 deficiency    ·     Past Surgical History:        Procedure Laterality Date    BACK SURGERY  2006    lower lumbar    CORONARY ARTERY BYPASS GRAFT  12/13/2013    CABG x 5 (Dr Karina Richards), svg to diag, om1 and 3, distal rca, kelly to lad.  CYSTOSCOPY N/A 1/10/2019    CYSTOSCOPY performed by Matt Will MD at [address] Left 5/29/2022    LEFT FOOT PARTIAL FIFTH RAY RESECTION WITH EXCISIONAL DEBRIDEMENT OF MUSCLE AND FASCIA, WITH APPLICATION OF GRAFT performed by Elyse Wagner DPM at [address] Right 5/1/2015    ORIF    LEG SURGERY Right 11/2/2021    RIGHT LOWER EXTREMITY ADVANCEMENT FLAP AND SPLIT THICKNESS SKIN GRAFT PLACEMENT; (WOUND- 10 CM X 5.5 CM; CLOSURE- 6 CM X 5.5 CM; SKIN GRAFT- 7.2 CM X 5 CM) performed by Bishop Ev MD at [address] [place] [address] 6/11/2020    [facility] performed by Jake Gruber MD at [facility] 5/4/2020    EGD BIOPSY performed by Jake Gruber MD at [place] [address]   ·     Medications Priorto Admission:    · Not in a hospital admission. Allergies:  Bactrim [sulfamethoxazole-trimethoprim]    Social History:   · TOBACCO:   reports that he quit smoking about 52 years ago. He has never used smokeless tobacco.  · ETOH:   reports no history of alcohol use. · DRUGS : none   · Patient currently lives in an assisted living environment  ·   Family History:       Problem Relation Age of Onset    Heart Disease Father    ·     Review of Systems    ROS: A 10 point review of systems was conducted, significant findings as noted in HPI. Physical Exam  Constitutional:       Appearance: Normal appearance. He is obese. HENT:      Head: Normocephalic and atraumatic.       Right Ear: External ear normal.      Left Ear: External ear normal.      Nose: Nose normal. Mouth/Throat:      Mouth: Mucous membranes are moist.   Eyes:      Pupils: Pupils are equal, round, and reactive to light. Cardiovascular:      Rate and Rhythm: Normal rate and regular rhythm. Pulses: Normal pulses. Pulmonary:      Effort: Pulmonary effort is normal.      Breath sounds: Normal breath sounds. Abdominal:      General: There is no distension. Palpations: Abdomen is soft. Tenderness: There is abdominal tenderness (suprapubic ). Musculoskeletal:         General: Normal range of motion. Comments: LE wrapped in dressing    Skin:     General: Skin is warm. Neurological:      General: No focal deficit present. Mental Status: He is alert and oriented to person, place, and time. Psychiatric:         Thought Content: Thought content normal.       Physical exam:       Vitals:    06/29/22 0130   BP: (!) 157/61   Pulse:    Resp:    Temp:    SpO2:        DATA:    Labs:  CBC:   Recent Labs     06/28/22 2149   WBC 7.2   HGB 11.4*   HCT 35.9*          BMP:   Recent Labs     06/28/22 2149 06/28/22  2334     --    K 5.1 4.6   *  --    CO2 21  --    BUN 24*  --    CREATININE 0.7*  --    GLUCOSE 111*  --      LFT's: No results for input(s): AST, ALT, ALB, BILITOT, ALKPHOS in the last 72 hours. Troponin: No results for input(s): TROPONINI in the last 72 hours. BNP:No results for input(s): BNP in the last 72 hours. ABGs: No results for input(s): PHART, EEB8DKY, PO2ART in the last 72 hours. INR: No results for input(s): INR in the last 72 hours.     U/A:  Recent Labs     06/28/22 2149   COLORU Yellow   PHUR 6.0   WBCUA 10-20*   RBCUA see below*   BACTERIA 4+*   CLARITYU Clear   SPECGRAV >=1.030   LEUKOCYTESUR MODERATE*   UROBILINOGEN 0.2   BILIRUBINUR Negative   BLOODU SMALL*   GLUCOSEU Negative       No orders to display           ASSESSMENT AND PLAN:    Urinary tract infection   H/O Proteus MDRO UTI 4/21, had multiple UTI with E. Fecalis, proteus, pseudomonas, candida in the past   Chronic indwelling catheter since 2019 for neurogenic bladder  UA with 10-20 WBC, positive nitrite, moderate LE and 4+ bacteria  No leukocytosis   -s/p 1 dose of cefepime in the ED  -will continue with cefepime 2g  I/V BID   -pharmacy to dose vancomycin, to cover gram Positives   -started on IV fluids @ 100 cc/hr   -ordered urine cultures   -exchanged existing foleys catheter  -daily labs     Lower extremity wounds   left MT osteomyelitis s/p L partial ray resection  B/L LE wrapped in dressings   Was recently admitted 5/25/22 and treated with abx   -will consult podiatry while inpatient   -wound care     Chronic medical problems   CAD s/p CABG in 2013   -continue home aspirin 81 mg daily   -continue lipitor 80 mg nightly      Hypothyroidism   -continue home synthyroid 75 mcg     BPH  -continue home flomax 0.8 mg PO daily   -chronic indwelling foleys catheter     Mood disorders   -continue home trazodone 25 mg PO nightly       Will discuss with attending physician Dr. Santos Estrella MD.     Code Status:Full code  FEN: ADULT DIET;  Regular  PPX: lovenox   DISPO: Angel Cueva MD  6/29/2022,  2:14 AM

## 2022-06-29 NOTE — PROGRESS NOTES
4 Eyes Admission Assessment     I agree as the admission nurse that 2 RN's have performed a thorough Head to Toe Skin Assessment on the patient. ALL assessment sites listed below have been assessed on admission. Areas assessed by both nurses: yes  [x]   Head, Face, and Ears   [x]   Shoulders, Back, and Chest  [x]   Arms, Elbows, and Hands   [x]   Coccyx, Sacrum, and Ischium  [x]   Legs, Feet, and Heels        Does the Patient have Skin Breakdown?   Yes a wound was noted on the Admission Assessment and an LDA was Initiated documentation include the Usha-wound, Wound Assessment, Measurements, Dressing Treatment, Drainage, and Color\",         Terrance Prevention initiated:  Yes   Wound Care Orders initiated:  Yes      [address] nurse consulted for Pressure Injury (Stage 3,4, Unstageable, DTI, NWPT, and Complex wounds) or Terrance score 18 or lower:  Yes, pt sees wound care OP MWF    Nurse 1 eSignature: Electronically signed by Orlin Jarquin RN on 6/29/22 at 3:29 AM EDT    **SHARE this note so that the co-signing nurse is able to place an eSignature**    Nurse 2 eSignature: Electronically signed by Donnell Banks RN on 6/29/22 at 4:51 AM EDT

## 2022-06-29 NOTE — PROGRESS NOTES
Pharmacy Note - Extended Infusion Beta-Lactam Adjustment    Cefepime ordered for treatment of UTI. Per [facility] Extended Infusion Beta-Lactam Policy, Cefepime will be changed to 2000 mg IV q12hr EI. Estimated Creatinine Clearance: Estimated Creatinine Clearance: 119 mL/min (A) (based on SCr of 0.6 mg/dL (L)). Dialysis Status, ALIS, CKD: None  BMI: Body mass index is 30.99 kg/m². Rationale for Adjustment: Agent is renally eliminated and demonstrates time-dependent effect on bacterial eradication. Extended-infusion dosing strategy aims to enhance microbiologic and clinical efficacy. Pharmacy will continue to monitor renal function, cultures and sensitivities (where available) and adjust dose as necessary. Please call with any questions.     Alexander Grant, PharmD  PGY-1 Pharmacy Resident  [id number]  6/29/2022 8:55 AM

## 2022-06-29 NOTE — PROGRESS NOTES
heard.      Pulmonary:      Effort: Pulmonary effort is normal. No respiratory distress. Breath sounds: Normal breath sounds. Abdominal:      General: Bowel sounds are normal. There is no distension. Palpations: Abdomen is soft. Tenderness: There is no abdominal tenderness. There is no guarding. Musculoskeletal:         General: No swelling. Comments: B/l LE wrapped   Neurological:      Mental Status: He is alert and oriented to person, place, and time. Mental status is at baseline. LABS:  CBC:   Recent Labs     06/28/22 2149   WBC 7.2   HGB 11.4*   HCT 35.9*      MCV 96.0     Renal:    Recent Labs     06/28/22  2149 06/28/22  2334 06/29/22  0616     --  142   K 5.1 4.6 4.3   *  --  112*   CO2 21  --  22   BUN 24*  --  23*   CREATININE 0.7*  --  0.6*   GLUCOSE 111*  --  114*   CALCIUM 8.4  --  8.3   MG  --   --  1.70*   ANIONGAP 10  --  8     Hepatic: No results for input(s): AST, ALT, BILITOT, BILIDIR, PROT, LABALBU, ALKPHOS in the last 72 hours. Troponin: No results for input(s): TROPONINI in the last 72 hours. BNP: No results for input(s): BNP in the last 72 hours. Lipids: No results for input(s): CHOL, HDL in the last 72 hours. Invalid input(s): LDLCALCU, TRIGLYCERIDE  ABGs:  No results for input(s): PHART, UZJ8VQF, PO2ART, DJP7OHI, BEART, THGBART, M0AFJQUV, EDE9KCX in the last 72 hours. INR: No results for input(s): INR in the last 72 hours. Lactate: No results for input(s): LACTATE in the last 72 hours.   Cultures:  -----------------------------------------------------------------  RAD:   No orders to display     Assessment/Plan:     Dysuria 2/2 acute complicated cystitis  Hx of MDRO UTI  Chronic indwelling schneider 2/2 neurogenic bladder  H/O Proteus MDRO UTI 4/21, had multiple UTI with E. Fecalis, proteus, pseudomonas, candida in the past   Chronic indwelling catheter since 2019 for neurogenic bladder  UA with 10-20 WBC, positive nitrite, moderate LE and 4+ bacteria    -On vanc and cefepime  -pending urine Cx  -Pt does not appear septic; afebrile, HR and BP wnl  -ordered urine cultures   -exchanged existing foleys catheter     Lower extremity wounds   Left MT osteomyelitis s/p L partial ray resection  MRSA +ve wound cx on 05/25/22   B/L LE wrapped in dressings   Was recently admitted 5/25/22 and treated with abx   -Podiatry onboard  -wound care  -Does not look infected      Chronic medical problems   CAD s/p CABG in 2013   -continue home aspirin 81 mg daily   -continue lipitor 80 mg nightly      Hypothyroidism   -continue home synthyroid 75 mcg      BPH  -continue home flomax 0.8 mg PO daily   -chronic indwelling foleys catheter      Mood disorders   -continue home trazodone 25 mg PO nightly       Code Status:Full code  FEN: ADULT DIET;  Regular  PPX: lovenox   DISPO: Meena Cruz MD, PGY-2  06/29/22  10:55 AM    This patient has been staffed and discussed with Prieto Gr MD.

## 2022-06-29 NOTE — PROGRESS NOTES
Physical Therapy and Occupational Therapy  Discharge Note    OT/PT orders received, chart reviewed. Pt well known to PT/OT services with last recent admission 5/25-6/2. Pt lives at [facility]. Per Rn at [facility] pt is non-ambulatory at baseline, raymundo lift is used to transfer patient. He is dependent with all ADLs in bed. Pt is not appropriate for acute OT/PT evaluations or treatment. Anticipate pt will return to prior setting with prior care at discharge. Will sign off. RN informed. Marda Brinks, [place] [employer] Jesse Cooks, [address] [place] [address]

## 2022-06-29 NOTE — ED PROVIDER NOTES
Sodium 142 136 - 145 mmol/L    Potassium reflex Magnesium 5.1 3.5 - 5.1 mmol/L    Chloride 111 (H) 99 - 110 mmol/L    CO2 21 21 - 32 mmol/L    Anion Gap 10 3 - 16    Glucose 111 (H) 70 - 99 mg/dL    BUN 24 (H) 7 - 20 mg/dL    CREATININE 0.7 (L) 0.8 - 1.3 mg/dL    GFR Non-African American >60 >60    GFR African American >60 >60    Calcium 8.4 8.3 - 10.6 mg/dL   Lactate, Sepsis   Result Value Ref Range    Lactic Acid, Sepsis 0.9 0.4 - 1.9 mmol/L   Urinalysis   Result Value Ref Range    Color, UA Yellow Straw/Yellow    Clarity, UA Clear Clear    Glucose, Ur Negative Negative mg/dL    Bilirubin Urine Negative Negative    Ketones, Urine Negative Negative mg/dL    Specific Gravity, UA >=1.030 1.005 - 1.030    Blood, Urine SMALL (A) Negative    pH, UA 6.0 5.0 - 8.0    Protein, UA 30 (A) Negative mg/dL    Urobilinogen, Urine 0.2 <2.0 E.U./dL    Nitrite, Urine POSITIVE (A) Negative    Leukocyte Esterase, Urine MODERATE (A) Negative    Microscopic Examination YES     Urine Type Voided    Microscopic Urinalysis   Result Value Ref Range    WBC, UA 10-20 (A) 0 - 5 /HPF    RBC, UA see below (A) 0 - 4 /HPF    Epithelial Cells, UA 2-5 0 - 5 /HPF    Bacteria, UA 4+ (A) None Seen /HPF       RECENT VITALS:  BP: (!) 142/56, Temp: 98.7 °F (37.1 °C), Heart Rate: 62, Resp: 18, SpO2: 96 %     Procedures     N/A    ED Course          The patient was given the following medications:  Orders Placed This Encounter   Medications    cefepime (MAXIPIME) 2000 mg IVPB minibag     Order Specific Question:   Antimicrobial Indications     Answer:   Urinary Tract Infection       CONSULTS:  IP CONSULT TO HOSPITALIST    MEDICAL DECISION MAKING / ASSESSMENT / Aida Zeb is a [de-identified] y.o. with male with chronic indwelling Iglesias catheter for neurogenic bladder presents complaining of neurogenic bladder possible urinary tract infection.   Patient states earlier today he started developing symptoms of suprapubic discomfort and dysuria that felt similar to when he was diagnosed with urinary tract infection 2 months ago. Patient denies any systemic symptoms such as fevers, nausea, or vomiting. On arrival, patient is mildly hypertensive but otherwise hemodynamically stable. He has a soft abdomen on exam.  He does have dressings on his bilateral lower extremities where he does have a history of osteomyelitis in the past but there is no drainage and he has no discomfort to this area so do not feel that this would be in a source of infection. Laboratory studies are unremarkable. Urinalysis does show 10-20 white blood cells with 4+ bacteria with positive nitrite and leukocyte Estrace. Patient's Iglesias catheter was changed, urine culture was ordered, and he was started on cefepime. In review of his records, his most recent urine culture grew Proteus penneri which was resistant to most antibiotics. Patient will be admitted to the hospitalist service for continued IV antibiotics until speciation of his urine culture. Clinical Impression     1. Complicated UTI (urinary tract infection)        Disposition     PATIENT REFERRED TO:  No follow-up provider specified.     DISCHARGE MEDICATIONS:  New Prescriptions    No medications on file       DISPOSITION Decision To Admit 06/28/2022 11:23:17 PM        Mariia Martinez MD  06/29/22 [id number]

## 2022-06-30 LAB
ANION GAP SERPL CALCULATED.3IONS-SCNC: 8 MMOL/L (ref 3–16)
BASOPHILS ABSOLUTE: 0 K/UL (ref 0–0.2)
BASOPHILS RELATIVE PERCENT: 0.7 %
BUN BLDV-MCNC: 26 MG/DL (ref 7–20)
CALCIUM SERPL-MCNC: 8.4 MG/DL (ref 8.3–10.6)
CHLORIDE BLD-SCNC: 107 MMOL/L (ref 99–110)
CO2: 22 MMOL/L (ref 21–32)
CREAT SERPL-MCNC: 0.7 MG/DL (ref 0.8–1.3)
EOSINOPHILS ABSOLUTE: 0.5 K/UL (ref 0–0.6)
EOSINOPHILS RELATIVE PERCENT: 8.9 %
GFR AFRICAN AMERICAN: >60
GFR NON-AFRICAN AMERICAN: >60
GLUCOSE BLD-MCNC: 114 MG/DL (ref 70–99)
HCT VFR BLD CALC: 31.7 % (ref 40.5–52.5)
HEMOGLOBIN: 10.3 G/DL (ref 13.5–17.5)
LYMPHOCYTES ABSOLUTE: 1.1 K/UL (ref 1–5.1)
LYMPHOCYTES RELATIVE PERCENT: 18.4 %
MAGNESIUM: 1.9 MG/DL (ref 1.8–2.4)
MCH RBC QN AUTO: 30.7 PG (ref 26–34)
MCHC RBC AUTO-ENTMCNC: 32.6 G/DL (ref 31–36)
MCV RBC AUTO: 94.2 FL (ref 80–100)
MONOCYTES ABSOLUTE: 0.3 K/UL (ref 0–1.3)
MONOCYTES RELATIVE PERCENT: 4.9 %
NEUTROPHILS ABSOLUTE: 3.9 K/UL (ref 1.7–7.7)
NEUTROPHILS RELATIVE PERCENT: 67.1 %
PDW BLD-RTO: 19 % (ref 12.4–15.4)
PLATELET # BLD: 146 K/UL (ref 135–450)
PMV BLD AUTO: 8.9 FL (ref 5–10.5)
POTASSIUM REFLEX MAGNESIUM: 4.5 MMOL/L (ref 3.5–5.1)
RBC # BLD: 3.36 M/UL (ref 4.2–5.9)
SODIUM BLD-SCNC: 137 MMOL/L (ref 136–145)
VANCOMYCIN RANDOM: 17.4 UG/ML
WBC # BLD: 5.8 K/UL (ref 4–11)

## 2022-06-30 PROCEDURE — 6360000002 HC RX W HCPCS

## 2022-06-30 PROCEDURE — 6370000000 HC RX 637 (ALT 250 FOR IP)

## 2022-06-30 PROCEDURE — 80202 ASSAY OF VANCOMYCIN: CPT

## 2022-06-30 PROCEDURE — 2580000003 HC RX 258

## 2022-06-30 PROCEDURE — 1200000000 HC SEMI PRIVATE

## 2022-06-30 PROCEDURE — 36415 COLL VENOUS BLD VENIPUNCTURE: CPT

## 2022-06-30 PROCEDURE — 6370000000 HC RX 637 (ALT 250 FOR IP): Performed by: STUDENT IN AN ORGANIZED HEALTH CARE EDUCATION/TRAINING PROGRAM

## 2022-06-30 PROCEDURE — 6360000002 HC RX W HCPCS: Performed by: INTERNAL MEDICINE

## 2022-06-30 PROCEDURE — 80048 BASIC METABOLIC PNL TOTAL CA: CPT

## 2022-06-30 PROCEDURE — 85025 COMPLETE CBC W/AUTO DIFF WBC: CPT

## 2022-06-30 PROCEDURE — 83735 ASSAY OF MAGNESIUM: CPT

## 2022-06-30 RX ORDER — FUROSEMIDE 10 MG/ML
40 INJECTION INTRAMUSCULAR; INTRAVENOUS DAILY
Status: DISCONTINUED | OUTPATIENT
Start: 2022-06-30 | End: 2022-07-01

## 2022-06-30 RX ADMIN — TAMSULOSIN HYDROCHLORIDE 0.8 MG: 0.4 CAPSULE ORAL at 08:38

## 2022-06-30 RX ADMIN — VANCOMYCIN HYDROCHLORIDE 750 MG: 10 INJECTION, POWDER, LYOPHILIZED, FOR SOLUTION INTRAVENOUS at 04:16

## 2022-06-30 RX ADMIN — SODIUM CHLORIDE 25 ML: 9 INJECTION, SOLUTION INTRAVENOUS at 11:43

## 2022-06-30 RX ADMIN — NYSTATIN: 100000 CREAM TOPICAL at 08:38

## 2022-06-30 RX ADMIN — GABAPENTIN 600 MG: 600 TABLET, FILM COATED ORAL at 08:38

## 2022-06-30 RX ADMIN — ATORVASTATIN CALCIUM 80 MG: 40 TABLET, FILM COATED ORAL at 21:41

## 2022-06-30 RX ADMIN — LEVOTHYROXINE SODIUM 75 MCG: 0.07 TABLET ORAL at 06:20

## 2022-06-30 RX ADMIN — ENOXAPARIN SODIUM 30 MG: 100 INJECTION SUBCUTANEOUS at 21:41

## 2022-06-30 RX ADMIN — SODIUM CHLORIDE, PRESERVATIVE FREE 5 ML: 5 INJECTION INTRAVENOUS at 11:54

## 2022-06-30 RX ADMIN — ASPIRIN 81 MG 81 MG: 81 TABLET ORAL at 08:38

## 2022-06-30 RX ADMIN — CEFEPIME HYDROCHLORIDE 2000 MG: 2 INJECTION, POWDER, FOR SOLUTION INTRAVENOUS at 21:41

## 2022-06-30 RX ADMIN — VANCOMYCIN HYDROCHLORIDE 750 MG: 10 INJECTION, POWDER, LYOPHILIZED, FOR SOLUTION INTRAVENOUS at 16:33

## 2022-06-30 RX ADMIN — ENOXAPARIN SODIUM 30 MG: 100 INJECTION SUBCUTANEOUS at 08:39

## 2022-06-30 RX ADMIN — Medication 5 MG: at 23:03

## 2022-06-30 RX ADMIN — PANTOPRAZOLE SODIUM 40 MG: 40 TABLET, DELAYED RELEASE ORAL at 06:20

## 2022-06-30 RX ADMIN — GABAPENTIN 600 MG: 600 TABLET, FILM COATED ORAL at 21:42

## 2022-06-30 RX ADMIN — CEFEPIME HYDROCHLORIDE 2000 MG: 2 INJECTION, POWDER, FOR SOLUTION INTRAVENOUS at 11:45

## 2022-06-30 RX ADMIN — FUROSEMIDE 40 MG: 10 INJECTION, SOLUTION INTRAMUSCULAR; INTRAVENOUS at 18:23

## 2022-06-30 RX ADMIN — TRAZODONE HYDROCHLORIDE 25 MG: 50 TABLET ORAL at 21:42

## 2022-06-30 RX ADMIN — NYSTATIN: 100000 CREAM TOPICAL at 22:17

## 2022-06-30 RX ADMIN — SODIUM CHLORIDE, PRESERVATIVE FREE 10 ML: 5 INJECTION INTRAVENOUS at 21:42

## 2022-06-30 NOTE — PROGRESS NOTES
collected for AUC-based dosing may be high if collected in close proximity to the dose administered. This is not necessarily indicative of toxicity. Cultures & Sensitivities:    Date Site Micro Susceptibility / Result   6/28/22 Urine pending            Labs / Ancillary Data:    Estimated Creatinine Clearance: 102 mL/min (A) (based on SCr of 0.7 mg/dL (L)).     Recent Labs     06/28/22  2149 06/29/22  0616 06/30/22  0641   CREATININE 0.7* 0.6* 0.7*   BUN 24* 23* 26*   WBC 7.2  --  5.8

## 2022-06-30 NOTE — CONSULTS
Vancomycin has been discontinued. Pharmacy will sign off consult. If medication dosing is resumed, please re-consult pharmacy.     Joaquin Contreras PharmD, [facility] 6/30/2022 5:09 PM

## 2022-06-30 NOTE — CARE COORDINATION
Still awaiting Urine cultures for decision on IV or PO at discharge.  Electronically signed by Carmen Streeter RN on 6/30/2022 at 5:36 PM

## 2022-06-30 NOTE — CARE COORDINATION
Called , [phone]- to see if we need to change discharge plans to SNF or if they will take patient back to their facility-left this message on her VM. Faxed updated notes to attn: Annie Anthony. Fax: [phone]. CM will continue to follow patient until discharge. Electronically signed by Reji Mendoza RN on 6/30/2022 at 3:00 PM    Addendum:   Perfect[facility]ed resident (Dr. Thanh López) to see if patient could go back to [facility] with PO antibiotic/s or this CM needs to set up skilled care at [facility] for discharge.  Electronically signed by Reji Mendoza RN on 6/30/2022 at 3:10 PM

## 2022-06-30 NOTE — PROGRESS NOTES
Podiatric Surgery Daily Progress Note  Gricel Block      Subjective :   Patient seen and examined this am at the bedside. Patient denies any acute overnight events. Patient denies N/V/F/C/SOB. Patient denies calf pain, thigh pain, chest pain. Review of Systems: A 12 point review of symptoms is unremarkable with the exception of the chief complaint. Patient specifically denies nausea, fever, vomiting, chills, shortness of breath, chest pain, abdominal pain, constipation or difficulty urinating. Objective     BP (!) 153/73   Pulse 59   Temp 97.7 °F (36.5 °C) (Oral)   Resp 18   Ht 5' 11\" (1.803 m)   Wt 222 lb 3.6 oz (100.8 kg)   SpO2 94%   BMI 30.99 kg/m²      I/O:    Intake/Output Summary (Last 24 hours) at 6/30/2022 0653  Last data filed at 6/30/2022 0413  Gross per 24 hour   Intake 480 ml   Output 1235 ml   Net -755 ml              Wt Readings from Last 3 Encounters:   06/29/22 222 lb 3.6 oz (100.8 kg)   06/02/22 210 lb 12.2 oz (95.6 kg)   04/22/22 232 lb 12.8 oz (105.6 kg)       LABS:    Recent Labs     06/28/22  2149 06/30/22  0641   WBC 7.2 5.8   HGB 11.4* 10.3*   HCT 35.9* 31.7*    146        Recent Labs     06/29/22  0616      K 4.3   *   CO2 22   BUN 23*   CREATININE 0.6*      No results for input(s): PROT, INR, APTT in the last 72 hours. LOWER EXTREMITY EXAMINATION    Dressing to bilateral LE intact. No strikethrough noted to the external dressing. No drainage noted to the internal layers of the dressing. VASCULAR: DP and PT pulses non-palpable B/L but upon doppler examination were found to be biphasic B/L. CFT is brisk to the digits of the foot b/l. Skin temperature is warm to warm from proximal to distal with no focal increase in temperature noted. No erythema noted but nonpitting edema noted. No pain with calf compression b/l.     NEUROLOGIC: Gross and epicritic sensation is intact b/l.  Protective sensation is diminished at all pedal sites b/l.     DERMATOLOGIC: Diffuse dermatologic changes noted to b/l LE.     Right lower extremity partial thickness and full thickness ulcerations from previous admission have mostly all epithelialized with no new open wounds, lesions, or lacerations noted with no acute signs of infection noted. Small partial thickness ulceration noted to the lateral aspect of the right 5th digit that has a hyperkeratotic periwound but no acute signs of infection. Patient provided verbal consent for pictures obtained today:             Left lower extremity shows suture site well healed with graft incorporation. Skin is diffusely xerotic with some small abrasion noted to the anterior aspect of the left leg with no drainage or acute signs of infection noted.      Patient provided verbal consent for pictures obtained today:               MUSCULOSKELETAL: No pain to palpation of the foot or ankle b/l. Muscle strength 5/5 for all pedal muscle groups. Ankle joint ROM is decreased in dorsiflexion with the knee extended. No obvious biomechanical abnormalities      IMAGING:    XR Foot Left     Narrative   EXAM: LEFT FOOT 3 VIEWS       INDICATION: left foot partial 5th ray resection follow up       COMPARISON: 5/29/22       FINDINGS:       Bones are severely osteopenic limiting detail. 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 No acute fracture or malalignment. No definite erosion. Mild generalized swelling. Extensive vascular calcifications.           Impression       1.  No acute osseous findings. 2.  Post partial 5th ray and dictation.         ASSESSMENT/PLAN    1. S/p left foot partial 5th ray resection with graft application (DOS 65/57/3925)  2. Partial thickness ulceration, lateral right 5th digit (Bueno 1)  3.  Peripheral Neuropathy, B/L LE     -Patient examined and evaluated at bedside   -Hypotensive but other VSS, no leukocytosis noted (WBC 5.8)  -CRP <3, ESR 14  -Prealbumin pending, Hemoglobin A1c 5.7 (6/21)  -Wound culture not taken as no open wounds or active drainage noted. -Imaging reviewed, impression noted above. 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 Will follow up on prealbumin. No antibiotics necessary from podiatric standpoint. Patient is to remain WBAT to B/L lower extremities. Patient is stable for discharge from podiatric standpoint pending medical clearance. - The patient assessment and plan was discussed with Dr. Ada Story, [address] [place] [address]  Podiatric Resident, PGY-1  Pager #: [phone] or Perfect Serve     Patient was seen and evaluated at bedside. Agree with residents assessment and treatment plan.   Ada Story DPM

## 2022-06-30 NOTE — CARE COORDINATION
Case Management Assessment           Initial Evaluation                Date / Time of Evaluation: 6/30/2022 3:12 PM                 Assessment Completed by: Jm Boothe RN    Patient Name: Shruthi Aguayo     YOB: 1940  Diagnosis: UTI (urinary tract infection) [R09.1]  Complicated UTI (urinary tract infection) [N39.0]     Date / Time: 6/28/2022  9:16 PM    Patient Admission Status: Inpatient    If patient is discharged prior to next notation, then this note serves as note for discharge by case management. Current PCP: Tim Matt Patient: No    Chart Reviewed: Yes  Patient/ Family Interviewed: Yes    Initial assessment completed at bedside with: staff -AL    Hospitalization in the last 30 days: Yes    Emergency Contacts:  Extended Emergency Contact Information  Primary Emergency Contact: Lai Serrano 14 Phone: [phone]  Relation: Child  Secondary Emergency Contact: Rose Mary 22 Phone: [phone]  Work Phone: [phone]  Mobile Phone: [phone]  Relation: Child    Advance Directives:   Code Status: [address]: No  Financial  Payor: Ebony Holloway / Plan: Azeb Margarita PLUS HMO / Product Type: *No Product type* /     Pre-cert required for SNF: No    [facility] (Now [address] [facility] Mail Delivery) - Mary Portillo   [address] [facility]  [address] [place] [address]  Phone: [phone] Fax: [phone]      Potential assistance Purchasing Medications: Potential Assistance Purchasing Medications: No  Does Patient want to participate in local refill/ meds to beds program?: No    Meds To Beds General Rules:  1. Can ONLY be done Monday- Friday between 8:30am-5pm  2. Prescription(s) must be in pharmacy by 3pm to be filled same day  3. Copy of patient's insurance/ prescription drug card and patient face sheet must be sent along with the prescription(s)  4. Cost of Rx cannot be added to hospital bill. If financial assistance is needed, please contact unit  or ;  or  CANNOT provide pharmacy voucher for patients co-pays  5. Patients can then  the prescription on their way out of the hospital at discharge, or pharmacy can deliver to the bedside if staff is available. (payment due at time of pick-up or delivery - cash, check, or card accepted)     Able to afford home medications/ co-pay costs: AL facility    ADLS  Support Systems: Family Members,Friends/Neighbors    PT AM-PAC:   /24  OT AM-PAC:   /24    [place]: [facility]  Steps: none    Plans to RETURN to current housing: TBD  Barriers to RETURNING to current housing: PO v. IV abx    DISCHARGE PLAN:  Disposition: TBD- hopefully go back to AL    Transportation PLAN for discharge: EMS transportation       Additional Case Management Notes: Patient is released from podiatry, but attending and resident perfectserved to tell if patient could go back to AL and have PO abx or has to have IV. If IV, must discharge to SNF because beyond scope of care for AL. Dr. Radha Paul still needs to see patient per her perfectserve. The Plan for Transition of Care is related to the following treatment goals of UTI (urinary tract infection) [E59.8]  Complicated UTI (urinary tract infection) [N39.0]    The Patient and/or patient representative Tatyana Ross and his family were provided with a choice of provider and agrees with the discharge plan Yes    Freedom of choice list was provided with basic dialogue that supports the patient's individualized plan of care/goals and shares the quality data associated with the providers.  Yes    Care Transition patient: No    Jm Boothe RN  [facility]  Case Management Department  Ph: [phone]

## 2022-07-01 LAB
ANION GAP SERPL CALCULATED.3IONS-SCNC: 11 MMOL/L (ref 3–16)
BASOPHILS ABSOLUTE: 0 K/UL (ref 0–0.2)
BASOPHILS RELATIVE PERCENT: 0.8 %
BUN BLDV-MCNC: 29 MG/DL (ref 7–20)
CALCIUM SERPL-MCNC: 8.7 MG/DL (ref 8.3–10.6)
CHLORIDE BLD-SCNC: 107 MMOL/L (ref 99–110)
CO2: 21 MMOL/L (ref 21–32)
CREAT SERPL-MCNC: 0.8 MG/DL (ref 0.8–1.3)
EOSINOPHILS ABSOLUTE: 0.7 K/UL (ref 0–0.6)
EOSINOPHILS RELATIVE PERCENT: 13 %
GFR AFRICAN AMERICAN: >60
GFR NON-AFRICAN AMERICAN: >60
GLUCOSE BLD-MCNC: 83 MG/DL (ref 70–99)
HCT VFR BLD CALC: 32.9 % (ref 40.5–52.5)
HEMOGLOBIN: 10.7 G/DL (ref 13.5–17.5)
LYMPHOCYTES ABSOLUTE: 1.5 K/UL (ref 1–5.1)
LYMPHOCYTES RELATIVE PERCENT: 26 %
MAGNESIUM: 1.9 MG/DL (ref 1.8–2.4)
MCH RBC QN AUTO: 31 PG (ref 26–34)
MCHC RBC AUTO-ENTMCNC: 32.4 G/DL (ref 31–36)
MCV RBC AUTO: 95.5 FL (ref 80–100)
MONOCYTES ABSOLUTE: 0.4 K/UL (ref 0–1.3)
MONOCYTES RELATIVE PERCENT: 7.8 %
NEUTROPHILS ABSOLUTE: 2.9 K/UL (ref 1.7–7.7)
NEUTROPHILS RELATIVE PERCENT: 52.4 %
ORGANISM: ABNORMAL
PDW BLD-RTO: 18.8 % (ref 12.4–15.4)
PLATELET # BLD: 147 K/UL (ref 135–450)
PMV BLD AUTO: 9.1 FL (ref 5–10.5)
POTASSIUM REFLEX MAGNESIUM: 4.6 MMOL/L (ref 3.5–5.1)
RBC # BLD: 3.45 M/UL (ref 4.2–5.9)
SODIUM BLD-SCNC: 139 MMOL/L (ref 136–145)
URINE CULTURE, ROUTINE: ABNORMAL
WBC # BLD: 5.6 K/UL (ref 4–11)

## 2022-07-01 PROCEDURE — 36415 COLL VENOUS BLD VENIPUNCTURE: CPT

## 2022-07-01 PROCEDURE — 6370000000 HC RX 637 (ALT 250 FOR IP): Performed by: INTERNAL MEDICINE

## 2022-07-01 PROCEDURE — 6370000000 HC RX 637 (ALT 250 FOR IP): Performed by: STUDENT IN AN ORGANIZED HEALTH CARE EDUCATION/TRAINING PROGRAM

## 2022-07-01 PROCEDURE — 80048 BASIC METABOLIC PNL TOTAL CA: CPT

## 2022-07-01 PROCEDURE — 6370000000 HC RX 637 (ALT 250 FOR IP)

## 2022-07-01 PROCEDURE — 2580000003 HC RX 258

## 2022-07-01 PROCEDURE — 6360000002 HC RX W HCPCS: Performed by: INTERNAL MEDICINE

## 2022-07-01 PROCEDURE — 6360000002 HC RX W HCPCS

## 2022-07-01 PROCEDURE — 83735 ASSAY OF MAGNESIUM: CPT

## 2022-07-01 PROCEDURE — 1200000000 HC SEMI PRIVATE

## 2022-07-01 PROCEDURE — 85025 COMPLETE CBC W/AUTO DIFF WBC: CPT

## 2022-07-01 RX ORDER — CEPHALEXIN 500 MG/1
500 CAPSULE ORAL EVERY 8 HOURS SCHEDULED
Status: DISCONTINUED | OUTPATIENT
Start: 2022-07-01 | End: 2022-07-01

## 2022-07-01 RX ORDER — FUROSEMIDE 10 MG/ML
40 INJECTION INTRAMUSCULAR; INTRAVENOUS 2 TIMES DAILY
Status: DISCONTINUED | OUTPATIENT
Start: 2022-07-01 | End: 2022-07-04

## 2022-07-01 RX ADMIN — FUROSEMIDE 40 MG: 10 INJECTION, SOLUTION INTRAMUSCULAR; INTRAVENOUS at 09:57

## 2022-07-01 RX ADMIN — GABAPENTIN 600 MG: 600 TABLET, FILM COATED ORAL at 20:28

## 2022-07-01 RX ADMIN — CEPHALEXIN 500 MG: 500 CAPSULE ORAL at 13:22

## 2022-07-01 RX ADMIN — ENOXAPARIN SODIUM 30 MG: 100 INJECTION SUBCUTANEOUS at 20:27

## 2022-07-01 RX ADMIN — GABAPENTIN 600 MG: 600 TABLET, FILM COATED ORAL at 09:57

## 2022-07-01 RX ADMIN — PANTOPRAZOLE SODIUM 40 MG: 40 TABLET, DELAYED RELEASE ORAL at 05:59

## 2022-07-01 RX ADMIN — ENOXAPARIN SODIUM 30 MG: 100 INJECTION SUBCUTANEOUS at 09:58

## 2022-07-01 RX ADMIN — ACETAMINOPHEN 325MG 650 MG: 325 TABLET ORAL at 23:29

## 2022-07-01 RX ADMIN — SODIUM CHLORIDE, PRESERVATIVE FREE 10 ML: 5 INJECTION INTRAVENOUS at 20:28

## 2022-07-01 RX ADMIN — CEFEPIME HYDROCHLORIDE 2000 MG: 2 INJECTION, POWDER, FOR SOLUTION INTRAVENOUS at 10:05

## 2022-07-01 RX ADMIN — TRAZODONE HYDROCHLORIDE 25 MG: 50 TABLET ORAL at 20:28

## 2022-07-01 RX ADMIN — ATORVASTATIN CALCIUM 80 MG: 40 TABLET, FILM COATED ORAL at 20:27

## 2022-07-01 RX ADMIN — CEFTRIAXONE 1000 MG: 1 INJECTION, POWDER, FOR SOLUTION INTRAMUSCULAR; INTRAVENOUS at 17:01

## 2022-07-01 RX ADMIN — Medication 5 MG: at 23:30

## 2022-07-01 RX ADMIN — FUROSEMIDE 40 MG: 10 INJECTION, SOLUTION INTRAMUSCULAR; INTRAVENOUS at 18:15

## 2022-07-01 RX ADMIN — TAMSULOSIN HYDROCHLORIDE 0.8 MG: 0.4 CAPSULE ORAL at 09:57

## 2022-07-01 RX ADMIN — NYSTATIN: 100000 CREAM TOPICAL at 10:05

## 2022-07-01 RX ADMIN — SODIUM CHLORIDE, PRESERVATIVE FREE 10 ML: 5 INJECTION INTRAVENOUS at 09:58

## 2022-07-01 RX ADMIN — LEVOTHYROXINE SODIUM 75 MCG: 0.07 TABLET ORAL at 05:59

## 2022-07-01 RX ADMIN — ASPIRIN 81 MG 81 MG: 81 TABLET ORAL at 09:57

## 2022-07-01 RX ADMIN — NYSTATIN: 100000 CREAM TOPICAL at 20:29

## 2022-07-01 NOTE — PROGRESS NOTES
Progress Note    Admit Date: 6/28/2022  Day: 3  Diet: ADULT DIET; Regular; Low Fat/Low Chol/High Fiber/2 gm Na  ADULT ORAL NUTRITION SUPPLEMENT; Breakfast, Lunch, Dinner; Wound Healing Oral Supplement    CC: Lower abdominal pain    Interval history: Pt says he is doing okay, acknowledges that he is being treated for his acute UTI with antibiotics, but is still complaining of lower abdominal pain. Pt has no other complaints. Medications:     Scheduled Meds:   furosemide  40 mg IntraVENous BID    cefTRIAXone (ROCEPHIN) IV  1,000 mg IntraVENous Q24H    aspirin  81 mg Oral Daily    atorvastatin  80 mg Oral Nightly    gabapentin  600 mg Oral BID    levothyroxine  75 mcg Oral QAM AC    pantoprazole  40 mg Oral QAM AC    tamsulosin  0.8 mg Oral Daily    sodium chloride flush  5-40 mL IntraVENous 2 times per day    enoxaparin  30 mg SubCUTAneous BID    traZODone  25 mg Oral Nightly    nystatin   Topical BID     Continuous Infusions:   sodium chloride 25 mL (06/30/22 1143)     PRN Meds:albuterol, sodium chloride flush, sodium chloride, ondansetron **OR** ondansetron, polyethylene glycol, acetaminophen **OR** acetaminophen, melatonin    Objective:   Vitals:   T-max:  Patient Vitals for the past 8 hrs:   BP Temp Temp src Pulse Resp SpO2   07/01/22 1657 138/74 97.8 °F (36.6 °C) Oral 70 16 94 %       Intake/Output Summary (Last 24 hours) at 7/1/2022 2025  Last data filed at 7/1/2022 1800  Gross per 24 hour   Intake 240 ml   Output 5450 ml   Net -5210 ml       Review of Systems negative    Physical Exam   Constitutional:       General: He is not in acute distress. Appearance: Normal appearance. He is normal weight. He is not ill-appearing, toxic-appearing or diaphoretic. Comments: Generally unkempt. Dry flaky skin   Eyes:      Pupils: Pupils are equal, round, and reactive to light. Cardiovascular:      Rate and Rhythm: Normal rate and regular rhythm. Pulses: Normal pulses.       Heart sounds: Normal heart sounds. No murmur heard. Pulmonary:      Effort: Pulmonary effort is normal. No respiratory distress. Breath sounds: Normal breath sounds. Abdominal:      General: Bowel sounds are normal. There is no distension. Palpations: Abdomen is soft. Tenderness: There is no abdominal tenderness. There is no guarding. Musculoskeletal:         General: No swelling. Comments: B/l LE wrapped   Neurological:      Mental Status: He is alert and oriented to person, place, and time. Mental status is at baseline. LABS:    CBC:   Recent Labs     06/28/22  2149 06/30/22  0641 07/01/22  0703   WBC 7.2 5.8 5.6   HGB 11.4* 10.3* 10.7*   HCT 35.9* 31.7* 32.9*    146 147   MCV 96.0 94.2 95.5     Renal:    Recent Labs     06/29/22  0616 06/30/22  0641 07/01/22  0703    137 139   K 4.3 4.5 4.6   * 107 107   CO2 22 22 21   BUN 23* 26* 29*   CREATININE 0.6* 0.7* 0.8   GLUCOSE 114* 114* 83   CALCIUM 8.3 8.4 8.7   MG 1.70* 1.90 1.90   ANIONGAP 8 8 11     Hepatic: No results for input(s): AST, ALT, BILITOT, BILIDIR, PROT, LABALBU, ALKPHOS in the last 72 hours. Troponin: No results for input(s): TROPONINI in the last 72 hours. BNP: No results for input(s): BNP in the last 72 hours. Lipids: No results for input(s): CHOL, HDL in the last 72 hours. Invalid input(s): LDLCALCU, TRIGLYCERIDE  ABGs:  No results for input(s): PHART, WGX1REB, PO2ART, MVD6AEC, BEART, THGBART, N9KTUEFW, FNS9QCD in the last 72 hours. INR: No results for input(s): INR in the last 72 hours. Lactate: No results for input(s): LACTATE in the last 72 hours. Cultures:  -----------------------------------------------------------------  RAD:   XR FOOT LEFT (MIN 3 VIEWS)   Final Result      1. No acute osseous findings. 2.  Post partial 5th ray and dictation.           Assessment/Plan:   Dysuria 2/2 acute complicated cystitis  Hx of MDRO UTI  Chronic indwelling schneider 2/2 neurogenic bladder  H/O Proteus MDRO UTI 4/21, had multiple UTI with E. Fecalis, proteus, pseudomonas, candida in the past   Chronic indwelling catheter since 2019 for neurogenic bladder  UA with 10-20 WBC, positive nitrite, moderate LE and 4+ bacteria     -IV ceftriaxone. cefepime, vanc discontinued. -Cx are back showing sensitivity to ciprofloxacin    Hypoalbuminemia  Prealbumin 14.1  -protein supplementation      Lower extremity wounds   Left MT osteomyelitis s/p L partial ray resection  MRSA +ve wound cx on 05/25/22   B/L LE wrapped in dressings   Was recently admitted 5/25/22 and treated with abx   -Podiatry seeing  -wound care  -Does not look infected      Chronic medical problems   CAD s/p CABG in 2013   -continue home aspirin 81 mg daily   -continue lipitor 80 mg nightly      Hypothyroidism   -continue home synthyroid 75 mcg      BPH  -continue home flomax 0.8 mg PO daily   -chronic indwelling foleys catheter      Mood disorders   -continue home trazodone 25 mg PO nightly       Code Status:Full code  FEN: ADULT DIET;  Regular; Low Fat/Low Chol/High Fiber/2 gm Na  PPX: lovenox   DISPO: Dulce Castro MD, PGY-1  07/01/22  8:25 PM    This patient has been staffed and discussed with Jj Flor MD.

## 2022-07-01 NOTE — PROGRESS NOTES
Podiatric Surgery Daily Progress Note  Mychal Harris Yawmena      Subjective :   Patient seen and examined this am at the bedside. Patient denies any acute overnight events. Patient denies N/V/F/C/SOB. Patient denies calf pain, thigh pain, chest pain. Review of Systems: A 12 point review of symptoms is unremarkable with the exception of the chief complaint. Patient specifically denies nausea, fever, vomiting, chills, shortness of breath, chest pain, abdominal pain, constipation or difficulty urinating. Objective     BP (!) 103/54   Pulse 64   Temp 97.2 °F (36.2 °C) (Oral)   Resp 18   Ht 5' 11\" (1.803 m)   Wt 222 lb 3.6 oz (100.8 kg)   SpO2 95%   BMI 30.99 kg/m²      I/O:    Intake/Output Summary (Last 24 hours) at 6/30/2022 2110  Last data filed at 6/30/2022 2024  Gross per 24 hour   Intake 240 ml   Output 1850 ml   Net -1610 ml              Wt Readings from Last 3 Encounters:   06/29/22 222 lb 3.6 oz (100.8 kg)   06/02/22 210 lb 12.2 oz (95.6 kg)   04/22/22 232 lb 12.8 oz (105.6 kg)       LABS:    Recent Labs     06/28/22  2149 06/30/22  0641   WBC 7.2 5.8   HGB 11.4* 10.3*   HCT 35.9* 31.7*    146        Recent Labs     06/30/22  0641      K 4.5      CO2 22   BUN 26*   CREATININE 0.7*      No results for input(s): PROT, INR, APTT in the last 72 hours. LOWER EXTREMITY EXAMINATION    Dressing to bilateral LE intact. No strikethrough noted to the external dressing. No drainage noted to the internal layers of the dressing. VASCULAR: DP and PT pulses non-palpable B/L but upon doppler examination were found to be biphasic B/L. CFT is brisk to the digits of the foot b/l. Skin temperature is warm to warm from proximal to distal with no focal increase in temperature noted. No erythema noted but nonpitting edema noted. No pain with calf compression b/l.     NEUROLOGIC: Gross and epicritic sensation is intact b/l.  Protective sensation is diminished at all pedal sites b/l.     DERMATOLOGIC: Diffuse dermatologic changes noted to b/l LE.     Right lower extremity partial thickness and full thickness ulcerations from previous admission have mostly all epithelialized with no new open wounds, lesions, or lacerations noted with no acute signs of infection noted. Small partial thickness ulceration noted to the lateral aspect of the right 5th digit that has a hyperkeratotic periwound but no acute signs of infection. Patient provided verbal consent for pictures obtained today:             Left lower extremity shows suture site well healed with graft incorporation. Skin is diffusely xerotic with some small abrasions noted to the anterior aspect of the left leg with no drainage or acute signs of infection noted.      Patient provided verbal consent for pictures obtained today:             MUSCULOSKELETAL: No pain to palpation of the foot or ankle b/l. Muscle strength 5/5 for all pedal muscle groups. Ankle joint ROM is decreased in dorsiflexion with the knee extended. No obvious biomechanical abnormalities    IMAGING:    XR Foot Left     Narrative   EXAM: LEFT FOOT 3 VIEWS       INDICATION: left foot partial 5th ray resection follow up       COMPARISON: 5/29/22       FINDINGS:       Bones are severely osteopenic limiting detail. 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 No acute fracture or malalignment. No definite erosion. Mild generalized swelling. Extensive vascular calcifications.           Impression       1.  No acute osseous findings. 2.  Post partial 5th ray and dictation.          ASSESSMENT/PLAN  1. S/p left foot partial 5th ray resection with graft application (DOS 42/37/8702)  2. Partial thickness ulceration, lateral right 5th digit (Bueno 1)  3.  Peripheral Neuropathy, B/L LE     -Patient examined and evaluated at bedside   -VSS, NO AM CBC  -CRP <3, ESR 14  -Prealbumin 14.1 (oral nutrition supplement ordered), Hemoglobin A1c 5.7 (6/21)  -Wound culture not taken as no open wounds or active drainage noted. -Urine culture: Klebsiella  -Imaging reviewed, impression noted above. 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      Dispo: Patient s/p left foot partial 5th ray resection with graft application. No antibiotics necessary from podiatric standpoint. Patient is to remain WBAT to B/L lower extremities, but while at rest patients feet are to be continuously offloaded in Prevalon boots Patient is stable for discharge from podiatric standpoint pending medical clearance.     - The patient assessment and plan was discussed with Dr. Jason Mae, [address] [place] [address]  Podiatric Resident, PGY-1  Pager #: [phone] or Perfect Serve

## 2022-07-01 NOTE — CARE COORDINATION
BAYRON provided the pain pump agreement paperwork to Volanda Cushing, who will provide to Jeannie Kee to review. Electronically signed by ARIC Omer LSW on 7/1/2022 at 3:57 PM  ___________________________________________________________________    BAYRON received a vm from [place] with [facility] [employer] about pt's pain pump refill, as the pump will need a refill by 7/5/22. Pt may DC back to [address] [place] [address] over the weekend, but in case pt is here longer, the infusion company faxed CM an agreement to be signed if their pharmacy ([facility]) has to come to [facility] to refill the pump. CM perfectserved this info to the Attending and placed the paperwork in pt's chart. CM supervisor confirmed that pt will DC on PO abx, and should DC over the weekend. CM left this info on Eda's ([address]) vm.        Electronically signed by ARIC Omer LSW on 7/1/2022 at 3:19 PM  ___________________________________________________________________    CM following. BAYRON returned call to Eli at [facility] [phone] to update that CM is still waiting to see if pt will need IV or PO abx, as pt will need SNF if it is IV. Pt has gone to [facility] in recent months. Eli also asked about level of wound care and pain pump. BAYRON will update her when updates are available.         Electronically signed by ARIC Omer LSW on 7/1/2022 at 12:11 PM

## 2022-07-02 LAB
ANION GAP SERPL CALCULATED.3IONS-SCNC: 5 MMOL/L (ref 3–16)
BASOPHILS ABSOLUTE: 0.1 K/UL (ref 0–0.2)
BASOPHILS RELATIVE PERCENT: 0.7 %
BUN BLDV-MCNC: 41 MG/DL (ref 7–20)
CALCIUM SERPL-MCNC: 8.6 MG/DL (ref 8.3–10.6)
CHLORIDE BLD-SCNC: 105 MMOL/L (ref 99–110)
CO2: 27 MMOL/L (ref 21–32)
CREAT SERPL-MCNC: 1 MG/DL (ref 0.8–1.3)
EOSINOPHILS ABSOLUTE: 0.7 K/UL (ref 0–0.6)
EOSINOPHILS RELATIVE PERCENT: 9.8 %
GFR AFRICAN AMERICAN: >60
GFR NON-AFRICAN AMERICAN: >60
GLUCOSE BLD-MCNC: 86 MG/DL (ref 70–99)
HCT VFR BLD CALC: 31.4 % (ref 40.5–52.5)
HEMOGLOBIN: 10.2 G/DL (ref 13.5–17.5)
LV EF: 58 %
LVEF MODALITY: NORMAL
LYMPHOCYTES ABSOLUTE: 1.3 K/UL (ref 1–5.1)
LYMPHOCYTES RELATIVE PERCENT: 17.4 %
MAGNESIUM: 1.9 MG/DL (ref 1.8–2.4)
MCH RBC QN AUTO: 30.7 PG (ref 26–34)
MCHC RBC AUTO-ENTMCNC: 32.6 G/DL (ref 31–36)
MCV RBC AUTO: 94.4 FL (ref 80–100)
MONOCYTES ABSOLUTE: 0.6 K/UL (ref 0–1.3)
MONOCYTES RELATIVE PERCENT: 7.5 %
NEUTROPHILS ABSOLUTE: 4.9 K/UL (ref 1.7–7.7)
NEUTROPHILS RELATIVE PERCENT: 64.6 %
PDW BLD-RTO: 18.2 % (ref 12.4–15.4)
PLATELET # BLD: 153 K/UL (ref 135–450)
PMV BLD AUTO: 9.6 FL (ref 5–10.5)
POTASSIUM REFLEX MAGNESIUM: 4.8 MMOL/L (ref 3.5–5.1)
RBC # BLD: 3.33 M/UL (ref 4.2–5.9)
SODIUM BLD-SCNC: 137 MMOL/L (ref 136–145)
WBC # BLD: 7.5 K/UL (ref 4–11)

## 2022-07-02 PROCEDURE — 6370000000 HC RX 637 (ALT 250 FOR IP): Performed by: INTERNAL MEDICINE

## 2022-07-02 PROCEDURE — 6360000002 HC RX W HCPCS

## 2022-07-02 PROCEDURE — 85025 COMPLETE CBC W/AUTO DIFF WBC: CPT

## 2022-07-02 PROCEDURE — 2580000003 HC RX 258

## 2022-07-02 PROCEDURE — 1200000000 HC SEMI PRIVATE

## 2022-07-02 PROCEDURE — 36415 COLL VENOUS BLD VENIPUNCTURE: CPT

## 2022-07-02 PROCEDURE — 6370000000 HC RX 637 (ALT 250 FOR IP): Performed by: STUDENT IN AN ORGANIZED HEALTH CARE EDUCATION/TRAINING PROGRAM

## 2022-07-02 PROCEDURE — 6360000002 HC RX W HCPCS: Performed by: INTERNAL MEDICINE

## 2022-07-02 PROCEDURE — 6370000000 HC RX 637 (ALT 250 FOR IP)

## 2022-07-02 PROCEDURE — 80048 BASIC METABOLIC PNL TOTAL CA: CPT

## 2022-07-02 PROCEDURE — 83735 ASSAY OF MAGNESIUM: CPT

## 2022-07-02 PROCEDURE — 93306 TTE W/DOPPLER COMPLETE: CPT

## 2022-07-02 RX ORDER — FLUCONAZOLE 150 MG/1
150 TABLET ORAL WEEKLY
Status: DISCONTINUED | OUTPATIENT
Start: 2022-07-02 | End: 2022-07-08 | Stop reason: HOSPADM

## 2022-07-02 RX ADMIN — TAMSULOSIN HYDROCHLORIDE 0.8 MG: 0.4 CAPSULE ORAL at 10:25

## 2022-07-02 RX ADMIN — GABAPENTIN 600 MG: 600 TABLET, FILM COATED ORAL at 10:25

## 2022-07-02 RX ADMIN — GABAPENTIN 600 MG: 600 TABLET, FILM COATED ORAL at 22:26

## 2022-07-02 RX ADMIN — ENOXAPARIN SODIUM 30 MG: 100 INJECTION SUBCUTANEOUS at 22:26

## 2022-07-02 RX ADMIN — FUROSEMIDE 40 MG: 10 INJECTION, SOLUTION INTRAMUSCULAR; INTRAVENOUS at 10:25

## 2022-07-02 RX ADMIN — SODIUM CHLORIDE, PRESERVATIVE FREE 10 ML: 5 INJECTION INTRAVENOUS at 10:27

## 2022-07-02 RX ADMIN — ATORVASTATIN CALCIUM 80 MG: 40 TABLET, FILM COATED ORAL at 22:26

## 2022-07-02 RX ADMIN — CEFTRIAXONE 1000 MG: 1 INJECTION, POWDER, FOR SOLUTION INTRAMUSCULAR; INTRAVENOUS at 16:00

## 2022-07-02 RX ADMIN — FUROSEMIDE 40 MG: 10 INJECTION, SOLUTION INTRAMUSCULAR; INTRAVENOUS at 17:38

## 2022-07-02 RX ADMIN — Medication 5 MG: at 23:30

## 2022-07-02 RX ADMIN — ENOXAPARIN SODIUM 30 MG: 100 INJECTION SUBCUTANEOUS at 10:25

## 2022-07-02 RX ADMIN — PANTOPRAZOLE SODIUM 40 MG: 40 TABLET, DELAYED RELEASE ORAL at 05:52

## 2022-07-02 RX ADMIN — LEVOTHYROXINE SODIUM 75 MCG: 0.07 TABLET ORAL at 05:52

## 2022-07-02 RX ADMIN — FLUCONAZOLE 150 MG: 150 TABLET ORAL at 17:34

## 2022-07-02 RX ADMIN — ASPIRIN 81 MG 81 MG: 81 TABLET ORAL at 10:25

## 2022-07-02 RX ADMIN — SODIUM CHLORIDE, PRESERVATIVE FREE 10 ML: 5 INJECTION INTRAVENOUS at 23:31

## 2022-07-02 RX ADMIN — TRAZODONE HYDROCHLORIDE 25 MG: 50 TABLET ORAL at 23:30

## 2022-07-02 RX ADMIN — NYSTATIN: 100000 CREAM TOPICAL at 11:56

## 2022-07-02 NOTE — PROGRESS NOTES
Conjunctiva/sclera: Conjunctivae normal.   Cardiovascular:      Rate and Rhythm: Normal rate and regular rhythm. Heart sounds: Normal heart sounds. Pulmonary:      Effort: Pulmonary effort is normal.   Abdominal:      Tenderness: There is abdominal tenderness. There is no guarding or rebound. Genitourinary:     Comments: Scrotal swelling and rash   Musculoskeletal:      Comments: BL LE wrapped   Skin:     General: Skin is warm and dry. Neurological:      Mental Status: He is alert. Mental status is at baseline. LABS:    CBC:   Recent Labs     06/30/22  0641 07/01/22  0703 07/02/22  0739   WBC 5.8 5.6 7.5   HGB 10.3* 10.7* 10.2*   HCT 31.7* 32.9* 31.4*    147 153   MCV 94.2 95.5 94.4     Renal:    Recent Labs     06/30/22  0641 07/01/22  0703    139   K 4.5 4.6    107   CO2 22 21   BUN 26* 29*   CREATININE 0.7* 0.8   GLUCOSE 114* 83   CALCIUM 8.4 8.7   MG 1.90 1.90   ANIONGAP 8 11     Hepatic: No results for input(s): AST, ALT, BILITOT, BILIDIR, PROT, LABALBU, ALKPHOS in the last 72 hours. Troponin: No results for input(s): TROPONINI in the last 72 hours. BNP: No results for input(s): BNP in the last 72 hours. Lipids: No results for input(s): CHOL, HDL in the last 72 hours. Invalid input(s): LDLCALCU, TRIGLYCERIDE  ABGs:  No results for input(s): PHART, PPV0GYR, PO2ART, EHX2YKD, BEART, THGBART, D1IDKGKU, EJQ9FNA in the last 72 hours. INR: No results for input(s): INR in the last 72 hours. Lactate: No results for input(s): LACTATE in the last 72 hours. Cultures:  -----------------------------------------------------------------  RAD:   XR FOOT LEFT (MIN 3 VIEWS)   Final Result      1. No acute osseous findings. 2.  Post partial 5th ray and dictation.           Assessment/Plan:     Dysuria 2/2 acute complicated cystitis  Hx of MDRO UTI  Chronic indwelling schneider 2/2 neurogenic bladder  Scrotal swelling  H/O Proteus MDRO UTI 4/21, had multiple UTI with E. Fecalis, proteus, pseudomonas, candida in the past   Chronic indwelling catheter since 2019 for neurogenic bladder  UA with 10-20 WBC, positive nitrite, moderate LE and 4+ bacteria     -IV ceftriaxone. cefepime, vanc discontinued.  -Lasix 40 mg IV bid     Hypoalbuminemia  Prealbumin 14.1  -protein supplementation      Lower extremity wounds   Left MT osteomyelitis s/p L partial ray resection  MRSA +ve wound cx on 05/25/22   B/L LE wrapped in dressings   Was recently admitted 5/25/22 and treated with abx   -Podiatry seeing  -wound care  -Does not look infected      Chronic medical problems   CAD s/p CABG in 2013   -continue home aspirin 81 mg daily   -continue lipitor 80 mg nightly      Hypothyroidism   -continue home synthyroid 75 mcg      BPH  -continue home flomax 0.8 mg PO daily   -chronic indwelling foleys catheter      Mood disorders   -continue home trazodone 25 mg PO nightly       Code Status:Full code  FEN: ADULT DIET;  Regular; Low Fat/Low Chol/High Fiber/2 gm Na  PPX: lovenox   DISPO: Seferino Sims MD, PGY-1  07/02/22  8:33 AM    This patient has been staffed and discussed with Judit Wiley MD.

## 2022-07-02 NOTE — PROGRESS NOTES
Physical Therapy and Occupational Therapy  Discharge Note     New orders for OT/PT orders received, chart reviewed (PT/OT signed off on 6/29). Previous therapy note copied \"Pt well known to PT/OT services with last recent admission 5/25-6/2. Pt lives at [facility].  Per Rn at [facility] pt is non-ambulatory at baseline, raymundo lift is used to transfer patient. Leena Salcedo is dependent with all ADLs in bed.  Pt is not appropriate for acute OT/PT evaluations or treatment.  Anticipate pt will return to prior setting with prior care at discharge.  Will sign off.  RN informed. \"    Crystal Jimenez OTR/L [address], [place] [address]

## 2022-07-02 NOTE — PROGRESS NOTES
Podiatric Surgery Daily Progress Note  Srini Block      Subjective :   Patient seen and examined this am at the bedside. Patient denies any acute overnight events. Patient denies N/V/F/C/SOB. Patient denies calf pain, thigh pain, chest pain. Review of Systems: A 12 point review of symptoms is unremarkable with the exception of the chief complaint. Patient specifically denies nausea, fever, vomiting, chills, shortness of breath, chest pain, abdominal pain,or constipation     Objective     /66   Pulse 57   Temp 97.6 °F (36.4 °C) (Oral)   Resp 16   Ht 5' 11\" (1.803 m)   Wt 233 lb 7.5 oz (105.9 kg)   SpO2 96%   BMI 32.56 kg/m²      I/O:    Intake/Output Summary (Last 24 hours) at 7/2/2022 0827  Last data filed at 7/2/2022 0504  Gross per 24 hour   Intake 240 ml   Output 3800 ml   Net -3560 ml              Wt Readings from Last 3 Encounters:   07/02/22 233 lb 7.5 oz (105.9 kg)   06/02/22 210 lb 12.2 oz (95.6 kg)   04/22/22 232 lb 12.8 oz (105.6 kg)       LABS:    Recent Labs     07/01/22  0703 07/02/22  0739   WBC 5.6 7.5   HGB 10.7* 10.2*   HCT 32.9* 31.4*    153        Recent Labs     07/01/22  0703      K 4.6      CO2 21   BUN 29*   CREATININE 0.8      No results for input(s): PROT, INR, APTT in the last 72 hours. LOWER EXTREMITY EXAMINATION    Dressing to bilateral LE intact. No strikethrough noted to the external dressing. No drainage noted to the internal layers of the dressing. VASCULAR: DP and PT pulses non-palpable B/L but upon doppler examination were found to be biphasic B/L. CFT is brisk to the digits of the foot b/l. Skin temperature is warm to warm from proximal to distal with no focal increase in temperature noted. No erythema noted. No edema noted to B/L LE 2/2 to compressive therapy. No pain with calf compression b/l.     NEUROLOGIC: Gross and epicritic sensation is intact b/l.  Protective sensation is diminished at all pedal sites b/l.     DERMATOLOGIC: Diffuse dermatologic changes noted to b/l LE. Patient provided verbal consent for photos to be obtained today:    Right lower extremity:            Scattered abrasions noted along the anterior and lateral aspect of the right leg. Partial-thickness ulcerations noted to the lateral aspect of the right foot at the base of the fifth metatarsal and the head of the fifth metatarsal with a hyperkeratotic periwound. No acute signs of infection noted. No drainage noted. No fluctuance, crepitus, or malodor noted. No tunneling or tracking noted. Left lower extremity:          Scattered abrasions noted to the anterior aspect of the left leg. Incision site on the lateral aspect of the left foot skin edges noted to be well coapted with graft incorporation noted to the distal aspect of the surgical incision. No fluctuance, crepitus, or malodor noted. No tunneling or tracking noted. No active drainage noted. No acute signs of infection noted. MUSCULOSKELETAL: No pain to palpation of the foot or ankle b/l. Muscle strength 5/5 for all pedal muscle groups. Ankle joint ROM is decreased in dorsiflexion with the knee extended. No obvious biomechanical abnormalities    IMAGING:    XR Foot Left     Narrative   EXAM: LEFT FOOT 3 VIEWS       INDICATION: left foot partial 5th ray resection follow up       COMPARISON: 5/29/22       FINDINGS:       Bones are severely osteopenic limiting detail. 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 No acute fracture or malalignment. No definite erosion. Mild generalized swelling. Extensive vascular calcifications.           Impression       1.  No acute osseous findings.    2.  Post partial 5th ray and dictation.          ASSESSMENT/PLAN  - Partial thickness ulcerations, lateral right 5th digit (Bueno 1)  - S/p left foot partial 5th ray resection with graft application (DOS 02/43/9918)  - Superficial abrasions, bilateral lower extremity  - Peripheral Neuropathy, bilateral lower extremity     -Patient examined and evaluated at bedside   -VSS, NO leukocytosis noted (WBC 7.5)  -CRP <3, ESR 14  -Prealbumin 14.1 (oral nutrition supplement ordered), Hemoglobin A1c 5.7 (6/21)  -Wound culture not taken as no active drainage noted or acute signs of infection noted. -Imaging reviewed, impression noted above. -Dressing applied to bilatleral LE consisting of adaptic, DSD, and Ace with gentle compression  -No Abx necessary from podiatric standpoint   -Patient is weightbearing as tolerated to bilateral lower extremity in protective shoe gear or postop shoe, but while at rest patient is to be continuously offloaded in Prevalon boots.      Dispo: Patient s/p left foot partial 5th ray resection with graft application. No antibiotics necessary from podiatric standpoint.  Patient is stable for discharge from podiatric standpoint pending medical clearance.    -The patient assessment and plan was discussed with ANGELINA Jackson DPM   Podiatric Resident PGY2  Pager [phone] or Ramandeep  7/2/2022, 8:36 AM

## 2022-07-03 ENCOUNTER — APPOINTMENT (OUTPATIENT)
Dept: GENERAL RADIOLOGY | Age: 82
DRG: 699 | End: 2022-07-03
Payer: MEDICARE

## 2022-07-03 LAB
ANION GAP SERPL CALCULATED.3IONS-SCNC: 9 MMOL/L (ref 3–16)
BASOPHILS ABSOLUTE: 0.1 K/UL (ref 0–0.2)
BASOPHILS RELATIVE PERCENT: 0.9 %
BUN BLDV-MCNC: 49 MG/DL (ref 7–20)
CALCIUM SERPL-MCNC: 8.8 MG/DL (ref 8.3–10.6)
CHLORIDE BLD-SCNC: 103 MMOL/L (ref 99–110)
CO2: 26 MMOL/L (ref 21–32)
CREAT SERPL-MCNC: 1 MG/DL (ref 0.8–1.3)
EOSINOPHILS ABSOLUTE: 1 K/UL (ref 0–0.6)
EOSINOPHILS RELATIVE PERCENT: 15.2 %
GFR AFRICAN AMERICAN: >60
GFR NON-AFRICAN AMERICAN: >60
GLUCOSE BLD-MCNC: 92 MG/DL (ref 70–99)
HCT VFR BLD CALC: 34.1 % (ref 40.5–52.5)
HEMOGLOBIN: 11 G/DL (ref 13.5–17.5)
LYMPHOCYTES ABSOLUTE: 1.5 K/UL (ref 1–5.1)
LYMPHOCYTES RELATIVE PERCENT: 23.4 %
MAGNESIUM: 2 MG/DL (ref 1.8–2.4)
MCH RBC QN AUTO: 30.5 PG (ref 26–34)
MCHC RBC AUTO-ENTMCNC: 32.2 G/DL (ref 31–36)
MCV RBC AUTO: 94.7 FL (ref 80–100)
MONOCYTES ABSOLUTE: 0.6 K/UL (ref 0–1.3)
MONOCYTES RELATIVE PERCENT: 9.2 %
NEUTROPHILS ABSOLUTE: 3.3 K/UL (ref 1.7–7.7)
NEUTROPHILS RELATIVE PERCENT: 51.3 %
PDW BLD-RTO: 18.6 % (ref 12.4–15.4)
PLATELET # BLD: 170 K/UL (ref 135–450)
PMV BLD AUTO: 9.4 FL (ref 5–10.5)
POTASSIUM REFLEX MAGNESIUM: 5.2 MMOL/L (ref 3.5–5.1)
PRO-BNP: 477 PG/ML (ref 0–449)
RBC # BLD: 3.6 M/UL (ref 4.2–5.9)
SODIUM BLD-SCNC: 138 MMOL/L (ref 136–145)
WBC # BLD: 6.4 K/UL (ref 4–11)

## 2022-07-03 PROCEDURE — 6370000000 HC RX 637 (ALT 250 FOR IP): Performed by: STUDENT IN AN ORGANIZED HEALTH CARE EDUCATION/TRAINING PROGRAM

## 2022-07-03 PROCEDURE — 6360000002 HC RX W HCPCS

## 2022-07-03 PROCEDURE — 2580000003 HC RX 258

## 2022-07-03 PROCEDURE — 83880 ASSAY OF NATRIURETIC PEPTIDE: CPT

## 2022-07-03 PROCEDURE — 85025 COMPLETE CBC W/AUTO DIFF WBC: CPT

## 2022-07-03 PROCEDURE — 74018 RADEX ABDOMEN 1 VIEW: CPT

## 2022-07-03 PROCEDURE — 6360000002 HC RX W HCPCS: Performed by: INTERNAL MEDICINE

## 2022-07-03 PROCEDURE — 80048 BASIC METABOLIC PNL TOTAL CA: CPT

## 2022-07-03 PROCEDURE — 36415 COLL VENOUS BLD VENIPUNCTURE: CPT

## 2022-07-03 PROCEDURE — 2500000003 HC RX 250 WO HCPCS: Performed by: STUDENT IN AN ORGANIZED HEALTH CARE EDUCATION/TRAINING PROGRAM

## 2022-07-03 PROCEDURE — 83735 ASSAY OF MAGNESIUM: CPT

## 2022-07-03 PROCEDURE — 6370000000 HC RX 637 (ALT 250 FOR IP)

## 2022-07-03 PROCEDURE — 1200000000 HC SEMI PRIVATE

## 2022-07-03 RX ORDER — POLYETHYLENE GLYCOL 3350 17 G/17G
17 POWDER, FOR SOLUTION ORAL 2 TIMES DAILY
Status: DISCONTINUED | OUTPATIENT
Start: 2022-07-03 | End: 2022-07-08 | Stop reason: HOSPADM

## 2022-07-03 RX ADMIN — SODIUM CHLORIDE, PRESERVATIVE FREE 10 ML: 5 INJECTION INTRAVENOUS at 22:33

## 2022-07-03 RX ADMIN — GABAPENTIN 600 MG: 600 TABLET, FILM COATED ORAL at 22:00

## 2022-07-03 RX ADMIN — LEVOTHYROXINE SODIUM 75 MCG: 0.07 TABLET ORAL at 06:23

## 2022-07-03 RX ADMIN — CEFTRIAXONE 1000 MG: 1 INJECTION, POWDER, FOR SOLUTION INTRAMUSCULAR; INTRAVENOUS at 16:31

## 2022-07-03 RX ADMIN — POLYETHYLENE GLYCOL 3350 17 G: 17 POWDER, FOR SOLUTION ORAL at 16:41

## 2022-07-03 RX ADMIN — POLYETHYLENE GLYCOL 3350 17 G: 17 POWDER, FOR SOLUTION ORAL at 22:00

## 2022-07-03 RX ADMIN — ASPIRIN 81 MG 81 MG: 81 TABLET ORAL at 09:25

## 2022-07-03 RX ADMIN — ACETAMINOPHEN 325MG 650 MG: 325 TABLET ORAL at 12:00

## 2022-07-03 RX ADMIN — TRAZODONE HYDROCHLORIDE 25 MG: 50 TABLET ORAL at 23:09

## 2022-07-03 RX ADMIN — FUROSEMIDE 40 MG: 10 INJECTION, SOLUTION INTRAMUSCULAR; INTRAVENOUS at 16:41

## 2022-07-03 RX ADMIN — MICONAZOLE NITRATE: 20 POWDER TOPICAL at 12:21

## 2022-07-03 RX ADMIN — TAMSULOSIN HYDROCHLORIDE 0.8 MG: 0.4 CAPSULE ORAL at 09:25

## 2022-07-03 RX ADMIN — MICONAZOLE NITRATE: 20 POWDER TOPICAL at 22:01

## 2022-07-03 RX ADMIN — ACETAMINOPHEN 325MG 650 MG: 325 TABLET ORAL at 18:53

## 2022-07-03 RX ADMIN — PANTOPRAZOLE SODIUM 40 MG: 40 TABLET, DELAYED RELEASE ORAL at 06:23

## 2022-07-03 RX ADMIN — ENOXAPARIN SODIUM 30 MG: 100 INJECTION SUBCUTANEOUS at 09:26

## 2022-07-03 RX ADMIN — GABAPENTIN 600 MG: 600 TABLET, FILM COATED ORAL at 09:25

## 2022-07-03 RX ADMIN — Medication 5 MG: at 23:09

## 2022-07-03 RX ADMIN — ATORVASTATIN CALCIUM 80 MG: 40 TABLET, FILM COATED ORAL at 22:00

## 2022-07-03 RX ADMIN — SODIUM CHLORIDE, PRESERVATIVE FREE 10 ML: 5 INJECTION INTRAVENOUS at 09:29

## 2022-07-03 RX ADMIN — FUROSEMIDE 40 MG: 10 INJECTION, SOLUTION INTRAMUSCULAR; INTRAVENOUS at 09:29

## 2022-07-03 ASSESSMENT — PAIN DESCRIPTION - LOCATION
LOCATION: LEG
LOCATION: FOOT
LOCATION: BACK
LOCATION: FOOT

## 2022-07-03 ASSESSMENT — PAIN DESCRIPTION - ONSET
ONSET: ON-GOING
ONSET: OTHER (COMMENT)

## 2022-07-03 ASSESSMENT — PAIN DESCRIPTION - ORIENTATION
ORIENTATION: RIGHT;LEFT
ORIENTATION: MID
ORIENTATION: MID
ORIENTATION: RIGHT;LEFT
ORIENTATION: RIGHT;LEFT
ORIENTATION: MID

## 2022-07-03 ASSESSMENT — PAIN SCALES - GENERAL
PAINLEVEL_OUTOF10: 6
PAINLEVEL_OUTOF10: 3
PAINLEVEL_OUTOF10: 6
PAINLEVEL_OUTOF10: 6
PAINLEVEL_OUTOF10: 0
PAINLEVEL_OUTOF10: 8
PAINLEVEL_OUTOF10: 6

## 2022-07-03 ASSESSMENT — PAIN DESCRIPTION - FREQUENCY: FREQUENCY: CONTINUOUS

## 2022-07-03 ASSESSMENT — PAIN DESCRIPTION - DESCRIPTORS
DESCRIPTORS: ACHING
DESCRIPTORS: ACHING
DESCRIPTORS: DISCOMFORT
DESCRIPTORS: ACHING
DESCRIPTORS: BURNING

## 2022-07-03 ASSESSMENT — PAIN SCALES - WONG BAKER: WONGBAKER_NUMERICALRESPONSE: 0

## 2022-07-03 ASSESSMENT — PAIN - FUNCTIONAL ASSESSMENT: PAIN_FUNCTIONAL_ASSESSMENT: PREVENTS OR INTERFERES SOME ACTIVE ACTIVITIES AND ADLS

## 2022-07-03 ASSESSMENT — PAIN DESCRIPTION - PAIN TYPE: TYPE: ACUTE PAIN

## 2022-07-03 NOTE — PROGRESS NOTES
Diffuse dermatologic changes noted to b/l LE. Patient provided verbal consent for photos to be obtained today:    Right lower extremity:        Scattered abrasions noted along the anterior and lateral aspect of the right leg. Partial-thickness ulcerations noted to the lateral aspect of the right foot at the base of the fifth metatarsal and the head of the fifth metatarsal with a hyperkeratotic periwound. No acute signs of infection noted. No drainage noted. No fluctuance, crepitus, or malodor noted. No tunneling or tracking noted. Left lower extremity:        Scattered abrasions noted to the anterior aspect of the left leg. Incision site on the lateral aspect of the left foot skin edges noted to be well coapted with graft incorporation noted to the distal aspect of the surgical incision. No fluctuance, crepitus, or malodor noted. No tunneling or tracking noted. No active drainage noted. No acute signs of infection noted. MUSCULOSKELETAL: No pain to palpation of the foot or ankle b/l. Muscle strength 5/5 for all pedal muscle groups. Ankle joint ROM is decreased in dorsiflexion with the knee extended. No obvious biomechanical abnormalities    IMAGING:    XR Foot Left     Narrative   EXAM: LEFT FOOT 3 VIEWS       INDICATION: left foot partial 5th ray resection follow up       COMPARISON: 5/29/22       FINDINGS:       Bones are severely osteopenic limiting detail. 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 No acute fracture or malalignment. No definite erosion. Mild generalized swelling. Extensive vascular calcifications.           Impression       1.  No acute osseous findings.    2.  Post partial 5th ray and dictation.          ASSESSMENT/PLAN  - Partial thickness ulcerations, lateral right 5th digit (Bueno 1)  - S/p left foot partial 5th ray resection with graft application (DOS 71/24/9150)  - Superficial abrasions, bilateral lower extremity  - Peripheral Neuropathy, bilateral lower extremity     -Patient examined and evaluated at bedside   -VSS, NO leukocytosis noted (WBC 6.4)  -CRP <3, ESR 14  -Prealbumin 14.1 (oral nutrition supplement ordered), Hemoglobin A1c 5.7 (6/21)  -Wound culture not taken as no active drainage noted or acute signs of infection noted. -Imaging reviewed, impression noted above. -Dressing applied to bilatleral LE consisting of adaptic, DSD, and Ace with gentle compression  -No Abx necessary from podiatric standpoint   -Patient is weightbearing as tolerated to bilateral lower extremity in protective shoe gear or postop shoe, but while at rest patient is to be continuously offloaded in Prevalon boots.      Dispo: Patient s/p left foot partial 5th ray resection with graft application (DOS 3/89/9756). No antibiotics necessary from podiatric standpoint. Patient is stable for discharge from podiatric standpoint pending medical clearance.     The patient assessment and plan was discussed with ANGELINA Echavarria Ala, DPM   Podiatric Resident PGY2  Pager [phone] or Ramandeep  7/3/2022, 8:09 AM

## 2022-07-03 NOTE — PROGRESS NOTES
Patient returned to room at this time. Patient transferred from stretcher to bed. Patient with complaint of pain to spine status post KUB. PRN requested and given per MD order.

## 2022-07-03 NOTE — PROGRESS NOTES
atraumatic. Right Ear: External ear normal.      Left Ear: External ear normal.      Nose: No rhinorrhea. Eyes:      Extraocular Movements: Extraocular movements intact. Conjunctiva/sclera: Conjunctivae normal.   Cardiovascular:      Rate and Rhythm: Normal rate and regular rhythm. Heart sounds: Normal heart sounds. Pulmonary:      Effort: Pulmonary effort is normal.   Abdominal:      General: Bowel sounds are normal.      Tenderness: There is abdominal tenderness. There is no guarding or rebound. Genitourinary:     Comments: Scrotal swelling and rash   Musculoskeletal:      Comments: BL LE wrapped   Skin:     General: Skin is warm and dry. Neurological:      Mental Status: He is alert. Mental status is at baseline. LABS:    CBC:   Recent Labs     07/01/22  0703 07/02/22  0739 07/03/22  0730   WBC 5.6 7.5 6.4   HGB 10.7* 10.2* 11.0*   HCT 32.9* 31.4* 34.1*    153 170   MCV 95.5 94.4 94.7     Renal:    Recent Labs     07/01/22 0703 07/02/22  0739 07/03/22  0730    137 138   K 4.6 4.8 5.2*    105 103   CO2 21 27 26   BUN 29* 41* 49*   CREATININE 0.8 1.0 1.0   GLUCOSE 83 86 92   CALCIUM 8.7 8.6 8.8   MG 1.90 1.90 2.00   ANIONGAP 11 5 9     Hepatic: No results for input(s): AST, ALT, BILITOT, BILIDIR, PROT, LABALBU, ALKPHOS in the last 72 hours. Troponin: No results for input(s): TROPONINI in the last 72 hours. BNP: No results for input(s): BNP in the last 72 hours. Lipids: No results for input(s): CHOL, HDL in the last 72 hours. Invalid input(s): LDLCALCU, TRIGLYCERIDE  ABGs:  No results for input(s): PHART, GRE5ETM, PO2ART, VXZ2KAW, BEART, THGBART, J0AITCRS, UAY8UET in the last 72 hours. INR: No results for input(s): INR in the last 72 hours. Lactate: No results for input(s): LACTATE in the last 72 hours. Cultures:  -----------------------------------------------------------------  RAD:   XR FOOT LEFT (MIN 3 VIEWS)   Final Result      1.   No acute osseous findings. 2.  Post partial 5th ray and dictation. Assessment/Plan:     Dysuria 2/2 acute complicated cystitis  Hx of MDRO UTI  Chronic indwelling schneider 2/2 neurogenic bladder  Scrotal swelling  H/O Proteus MDRO UTI 4/21, had multiple UTI with E. Fecalis, proteus, pseudomonas, candida in the past   Chronic indwelling catheter since 2019 for neurogenic bladder  UA with 10-20 WBC, positive nitrite, moderate LE and 4+ bacteria     -IV ceftriaxone. cefepime, vanc discontinued.  -Lasix 40 mg IV bid  -Elevate scrotum     Intertrigo likely 2/2 fungal rash  - Cont diflucan  - Miconazole powder to keep area dry    Abdominal pain and bloating 2/2 constipation  KUB with considerable stool burden. - enema  - scheduled glycolax BID    Hypoalbuminemia  Prealbumin 14.1  -protein supplementation      Lower extremity wounds   Left MT osteomyelitis s/p L partial ray resection  MRSA +ve wound cx on 05/25/22   B/L LE wrapped in dressings   Was recently admitted 5/25/22 and treated with abx   -Podiatry seeing  -wound care  -Does not look infected      Chronic medical problems   CAD s/p CABG in 2013   -continue home aspirin 81 mg daily   -continue lipitor 80 mg nightly      Hypothyroidism   -continue home synthyroid 75 mcg      BPH  -continue home flomax 0.8 mg PO daily   -chronic indwelling foleys catheter      Mood disorders   -continue home trazodone 25 mg PO nightly       Code Status:Full code  FEN: ADULT DIET;  Regular; Low Fat/Low Chol/High Fiber/2 gm Na  PPX: lovenox   DISPO: IP  PT OT pending    Stephanie Gallagher MD, PGY-3  07/03/22  10:20 AM    This patient has been staffed and discussed with Geo Ying MD.

## 2022-07-03 NOTE — PLAN OF CARE
Problem: Chronic Conditions and Co-morbidities  Goal: Patient's chronic conditions and co-morbidity symptoms are monitored and maintained or improved  7/3/2022 1017 by Ced Kamara RN  Outcome: Progressing     Problem: Discharge Planning  Goal: Discharge to home or other facility with appropriate resources  7/3/2022 1038 by Ced Kamara RN  Outcome: Progressing  7/3/2022 1017 by Ced Kamara RN  Outcome: Progressing    Problem: Pain  Goal: Verbalizes/displays adequate comfort level or baseline comfort level  7/3/2022 1038 by Ced Kamara RN  Outcome: Progressing  7/3/2022 1017 by Ced Kamara RN  Outcome: Progressing

## 2022-07-03 NOTE — PROGRESS NOTES
Patient has decent night, RN noticed raised skin graft on r) outer thigh.  This shown and passed to day shift RNs

## 2022-07-03 NOTE — PROGRESS NOTES
Patient transferred to transport bed and taken off unit by transport team for a KUB. Patient with no complaints prior to leaving. RN explained to patient that the doctor ordered the KUB.

## 2022-07-04 LAB
ALBUMIN SERPL-MCNC: 3.1 G/DL (ref 3.4–5)
ALBUMIN SERPL-MCNC: 3.4 G/DL (ref 3.4–5)
ALBUMIN SERPL-MCNC: 3.4 G/DL (ref 3.4–5)
ANION GAP SERPL CALCULATED.3IONS-SCNC: 10 MMOL/L (ref 3–16)
ANION GAP SERPL CALCULATED.3IONS-SCNC: 11 MMOL/L (ref 3–16)
ANION GAP SERPL CALCULATED.3IONS-SCNC: 14 MMOL/L (ref 3–16)
ANION GAP SERPL CALCULATED.3IONS-SCNC: 6 MMOL/L (ref 3–16)
BASOPHILS ABSOLUTE: 0.1 K/UL (ref 0–0.2)
BASOPHILS RELATIVE PERCENT: 1 %
BUN BLDV-MCNC: 60 MG/DL (ref 7–20)
BUN BLDV-MCNC: 63 MG/DL (ref 7–20)
BUN BLDV-MCNC: 65 MG/DL (ref 7–20)
BUN BLDV-MCNC: 66 MG/DL (ref 7–20)
CALCIUM SERPL-MCNC: 8.6 MG/DL (ref 8.3–10.6)
CALCIUM SERPL-MCNC: 8.7 MG/DL (ref 8.3–10.6)
CHLORIDE BLD-SCNC: 102 MMOL/L (ref 99–110)
CHLORIDE BLD-SCNC: 103 MMOL/L (ref 99–110)
CHLORIDE BLD-SCNC: 99 MMOL/L (ref 99–110)
CHLORIDE BLD-SCNC: 99 MMOL/L (ref 99–110)
CO2: 21 MMOL/L (ref 21–32)
CO2: 25 MMOL/L (ref 21–32)
CO2: 26 MMOL/L (ref 21–32)
CO2: 28 MMOL/L (ref 21–32)
CREAT SERPL-MCNC: 0.9 MG/DL (ref 0.8–1.3)
CREAT SERPL-MCNC: 1 MG/DL (ref 0.8–1.3)
EOSINOPHILS ABSOLUTE: 1 K/UL (ref 0–0.6)
EOSINOPHILS RELATIVE PERCENT: 18.3 %
GFR AFRICAN AMERICAN: >60
GFR NON-AFRICAN AMERICAN: >60
GLUCOSE BLD-MCNC: 119 MG/DL (ref 70–99)
GLUCOSE BLD-MCNC: 125 MG/DL (ref 70–99)
GLUCOSE BLD-MCNC: 86 MG/DL (ref 70–99)
GLUCOSE BLD-MCNC: 88 MG/DL (ref 70–99)
HCT VFR BLD CALC: 32.4 % (ref 40.5–52.5)
HEMOGLOBIN: 10.6 G/DL (ref 13.5–17.5)
LYMPHOCYTES ABSOLUTE: 1.3 K/UL (ref 1–5.1)
LYMPHOCYTES RELATIVE PERCENT: 24.9 %
MAGNESIUM: 2.1 MG/DL (ref 1.8–2.4)
MCH RBC QN AUTO: 31.1 PG (ref 26–34)
MCHC RBC AUTO-ENTMCNC: 32.8 G/DL (ref 31–36)
MCV RBC AUTO: 94.8 FL (ref 80–100)
MONOCYTES ABSOLUTE: 0.5 K/UL (ref 0–1.3)
MONOCYTES RELATIVE PERCENT: 10.2 %
NEUTROPHILS ABSOLUTE: 2.4 K/UL (ref 1.7–7.7)
NEUTROPHILS RELATIVE PERCENT: 45.6 %
PDW BLD-RTO: 17.7 % (ref 12.4–15.4)
PHOSPHORUS: 3.8 MG/DL (ref 2.5–4.9)
PHOSPHORUS: 3.9 MG/DL (ref 2.5–4.9)
PHOSPHORUS: 4 MG/DL (ref 2.5–4.9)
PLATELET # BLD: 156 K/UL (ref 135–450)
PMV BLD AUTO: 9.3 FL (ref 5–10.5)
POTASSIUM REFLEX MAGNESIUM: 5.7 MMOL/L (ref 3.5–5.1)
POTASSIUM SERPL-SCNC: 5.7 MMOL/L (ref 3.5–5.1)
POTASSIUM SERPL-SCNC: 5.9 MMOL/L (ref 3.5–5.1)
POTASSIUM SERPL-SCNC: 6.2 MMOL/L (ref 3.5–5.1)
RBC # BLD: 3.42 M/UL (ref 4.2–5.9)
SODIUM BLD-SCNC: 134 MMOL/L (ref 136–145)
SODIUM BLD-SCNC: 135 MMOL/L (ref 136–145)
SODIUM BLD-SCNC: 137 MMOL/L (ref 136–145)
SODIUM BLD-SCNC: 138 MMOL/L (ref 136–145)
WBC # BLD: 5.3 K/UL (ref 4–11)

## 2022-07-04 PROCEDURE — 1200000000 HC SEMI PRIVATE

## 2022-07-04 PROCEDURE — 93005 ELECTROCARDIOGRAM TRACING: CPT | Performed by: STUDENT IN AN ORGANIZED HEALTH CARE EDUCATION/TRAINING PROGRAM

## 2022-07-04 PROCEDURE — 2580000003 HC RX 258

## 2022-07-04 PROCEDURE — 80069 RENAL FUNCTION PANEL: CPT

## 2022-07-04 PROCEDURE — 6360000002 HC RX W HCPCS

## 2022-07-04 PROCEDURE — 85025 COMPLETE CBC W/AUTO DIFF WBC: CPT

## 2022-07-04 PROCEDURE — 6360000002 HC RX W HCPCS: Performed by: STUDENT IN AN ORGANIZED HEALTH CARE EDUCATION/TRAINING PROGRAM

## 2022-07-04 PROCEDURE — 6370000000 HC RX 637 (ALT 250 FOR IP)

## 2022-07-04 PROCEDURE — 6370000000 HC RX 637 (ALT 250 FOR IP): Performed by: STUDENT IN AN ORGANIZED HEALTH CARE EDUCATION/TRAINING PROGRAM

## 2022-07-04 PROCEDURE — 6360000002 HC RX W HCPCS: Performed by: INTERNAL MEDICINE

## 2022-07-04 PROCEDURE — 36415 COLL VENOUS BLD VENIPUNCTURE: CPT

## 2022-07-04 PROCEDURE — 6370000000 HC RX 637 (ALT 250 FOR IP): Performed by: INTERNAL MEDICINE

## 2022-07-04 PROCEDURE — 83735 ASSAY OF MAGNESIUM: CPT

## 2022-07-04 RX ORDER — LANOLIN ALCOHOL/MO/W.PET/CERES
CREAM (GRAM) TOPICAL 2 TIMES DAILY
Status: DISCONTINUED | OUTPATIENT
Start: 2022-07-04 | End: 2022-07-05

## 2022-07-04 RX ORDER — FUROSEMIDE 10 MG/ML
80 INJECTION INTRAMUSCULAR; INTRAVENOUS 2 TIMES DAILY
Status: DISCONTINUED | OUTPATIENT
Start: 2022-07-04 | End: 2022-07-07

## 2022-07-04 RX ORDER — AMMONIUM LACTATE 12 G/100G
LOTION TOPICAL PRN
Status: DISCONTINUED | OUTPATIENT
Start: 2022-07-04 | End: 2022-07-08 | Stop reason: HOSPADM

## 2022-07-04 RX ORDER — FUROSEMIDE 10 MG/ML
40 INJECTION INTRAMUSCULAR; INTRAVENOUS ONCE
Status: COMPLETED | OUTPATIENT
Start: 2022-07-04 | End: 2022-07-04

## 2022-07-04 RX ADMIN — ATORVASTATIN CALCIUM 80 MG: 40 TABLET, FILM COATED ORAL at 20:11

## 2022-07-04 RX ADMIN — GABAPENTIN 600 MG: 600 TABLET, FILM COATED ORAL at 20:12

## 2022-07-04 RX ADMIN — GABAPENTIN 600 MG: 600 TABLET, FILM COATED ORAL at 09:02

## 2022-07-04 RX ADMIN — ASPIRIN 81 MG 81 MG: 81 TABLET ORAL at 09:03

## 2022-07-04 RX ADMIN — ACETAMINOPHEN 325MG 650 MG: 325 TABLET ORAL at 18:32

## 2022-07-04 RX ADMIN — TAMSULOSIN HYDROCHLORIDE 0.8 MG: 0.4 CAPSULE ORAL at 09:02

## 2022-07-04 RX ADMIN — ENOXAPARIN SODIUM 30 MG: 100 INJECTION SUBCUTANEOUS at 09:02

## 2022-07-04 RX ADMIN — TRAZODONE HYDROCHLORIDE 25 MG: 50 TABLET ORAL at 23:31

## 2022-07-04 RX ADMIN — SODIUM CHLORIDE, PRESERVATIVE FREE 10 ML: 5 INJECTION INTRAVENOUS at 21:34

## 2022-07-04 RX ADMIN — CEFTRIAXONE 1000 MG: 1 INJECTION, POWDER, FOR SOLUTION INTRAMUSCULAR; INTRAVENOUS at 17:11

## 2022-07-04 RX ADMIN — Medication 5 MG: at 23:31

## 2022-07-04 RX ADMIN — MICONAZOLE NITRATE: 20 POWDER TOPICAL at 09:00

## 2022-07-04 RX ADMIN — FUROSEMIDE 40 MG: 10 INJECTION, SOLUTION INTRAMUSCULAR; INTRAVENOUS at 14:40

## 2022-07-04 RX ADMIN — FUROSEMIDE 40 MG: 10 INJECTION, SOLUTION INTRAMUSCULAR; INTRAVENOUS at 09:02

## 2022-07-04 RX ADMIN — LEVOTHYROXINE SODIUM 75 MCG: 0.07 TABLET ORAL at 05:41

## 2022-07-04 RX ADMIN — SODIUM CHLORIDE, PRESERVATIVE FREE 10 ML: 5 INJECTION INTRAVENOUS at 09:04

## 2022-07-04 RX ADMIN — ENOXAPARIN SODIUM 30 MG: 100 INJECTION SUBCUTANEOUS at 20:11

## 2022-07-04 RX ADMIN — ENOXAPARIN SODIUM 30 MG: 100 INJECTION SUBCUTANEOUS at 00:00

## 2022-07-04 RX ADMIN — MICONAZOLE NITRATE: 20 POWDER TOPICAL at 22:32

## 2022-07-04 RX ADMIN — POLYETHYLENE GLYCOL 3350 17 G: 17 POWDER, FOR SOLUTION ORAL at 20:12

## 2022-07-04 RX ADMIN — MICONAZOLE NITRATE: 20 CREAM TOPICAL at 09:04

## 2022-07-04 RX ADMIN — ACETAMINOPHEN 325MG 650 MG: 325 TABLET ORAL at 09:02

## 2022-07-04 RX ADMIN — Medication: at 14:39

## 2022-07-04 RX ADMIN — FUROSEMIDE 80 MG: 10 INJECTION, SOLUTION INTRAMUSCULAR; INTRAVENOUS at 17:00

## 2022-07-04 RX ADMIN — PANTOPRAZOLE SODIUM 40 MG: 40 TABLET, DELAYED RELEASE ORAL at 05:41

## 2022-07-04 RX ADMIN — Medication: at 22:32

## 2022-07-04 ASSESSMENT — PAIN SCALES - GENERAL
PAINLEVEL_OUTOF10: 7
PAINLEVEL_OUTOF10: 7
PAINLEVEL_OUTOF10: 2
PAINLEVEL_OUTOF10: 7
PAINLEVEL_OUTOF10: 0

## 2022-07-04 ASSESSMENT — PAIN DESCRIPTION - LOCATION
LOCATION: FOOT

## 2022-07-04 ASSESSMENT — PAIN DESCRIPTION - PAIN TYPE
TYPE: NEUROPATHIC PAIN
TYPE: CHRONIC PAIN

## 2022-07-04 ASSESSMENT — PAIN DESCRIPTION - DESCRIPTORS
DESCRIPTORS: ACHING;BURNING
DESCRIPTORS: BURNING

## 2022-07-04 ASSESSMENT — PAIN DESCRIPTION - ORIENTATION
ORIENTATION: RIGHT;LEFT

## 2022-07-04 ASSESSMENT — PAIN DESCRIPTION - ONSET: ONSET: OTHER (COMMENT)

## 2022-07-04 NOTE — PROGRESS NOTES
Nutrition Note    RECOMMENDATIONS  1. PO Diet: Continue current diet  2. ONS: Ensure High Protein and Lisandro BID  3. Nutrition Support: None      NUTRITION ASSESSMENT   Pt triggered positive nursing nutrition screen for wounds. Pt with increased nutrient needs related to lower extremity wounds and stage II pressure ulcer to sacrum. Pt reports good appetite and PO intake, consuming % of meals. Reviewed sources of protein; pt reports consuming protein at each meal. Pt with Lisandro ordered and is drinking this BID. Pt also requests Ensure High Protein BID; will order.  Nutrition Related Findings: +2 BLE edema. LBM 7/3.  Wounds: Stage II,Open Wounds   Nutrition Education:  Education not indicated      MALNUTRITION ASSESSMENT   Malnutrition Status: No malnutrition    NUTRITION DIAGNOSIS   · Increased nutrient needs related to increase demand for energy/nutrients as evidenced by wounds      CURRENT NUTRITION THERAPIES  ADULT DIET; Regular; Low Fat/Low Chol/High Fiber/2 gm Na  ADULT ORAL NUTRITION SUPPLEMENT; Breakfast, Lunch, Dinner; Wound Healing Oral Supplement     PO Intake: %   PO Supplement Intake:% (Lisandro)    ANTHROPOMETRICS   Current Height: 5' 11\" (180.3 cm)   Current Weight: 236 lb 1.8 oz (107.1 kg)     Ideal Body Weight (IBW): 172 lbs  (78 kg)        BMI: 32.9    The patient will be monitored per nutrition standards of care. Consult dietitian if additional nutrition interventions are needed prior to RD reassessment.      Sonia Delacruz, [address], LD    Contact: [phone]

## 2022-07-04 NOTE — PROGRESS NOTES
Podiatric Surgery Daily Progress Note  Jacquelyn Block      Subjective :   Patient seen and examined this am at the bedside. Patient denies N/V/F/C/SOB. Patient denies calf pain, thigh pain, chest pain. Review of Systems: A 12 point review of symptoms is unremarkable with the exception of the chief complaint. Patient specifically denies nausea, fever, vomiting, chills, shortness of breath, chest pain, abdominal pain,or constipation     Objective     /62   Pulse 63   Temp 98.2 °F (36.8 °C) (Oral)   Resp 16   Ht 5' 11\" (1.803 m)   Wt 236 lb 1.8 oz (107.1 kg)   SpO2 93%   BMI 32.93 kg/m²      I/O:    Intake/Output Summary (Last 24 hours) at 7/4/2022 0852  Last data filed at 7/4/2022 0543  Gross per 24 hour   Intake 10 ml   Output 3200 ml   Net -3190 ml              Wt Readings from Last 3 Encounters:   07/04/22 236 lb 1.8 oz (107.1 kg)   06/02/22 210 lb 12.2 oz (95.6 kg)   04/22/22 232 lb 12.8 oz (105.6 kg)       LABS:    Recent Labs     07/03/22  0730 07/04/22  0732   WBC 6.4 5.3   HGB 11.0* 10.6*   HCT 34.1* 32.4*    156        Recent Labs     07/04/22  0732      K 5.7*      CO2 28   BUN 60*   CREATININE 0.9      No results for input(s): PROT, INR, APTT in the last 72 hours. LOWER EXTREMITY EXAMINATION    Dressing to bilateral LE intact. No strikethrough noted to the external dressing. No drainage noted to the internal layers of the dressing. VASCULAR: DP and PT pulses non-palpable B/L but upon doppler examination were found to be biphasic B/L. CFT is brisk to the digits of the foot b/l. Skin temperature is warm to warm from proximal to distal with no focal increase in temperature noted. No erythema noted. No edema noted to B/L LE 2/2 to compressive therapy. No pain with calf compression b/l.     NEUROLOGIC: Gross and epicritic sensation is intact b/l.  Protective sensation is diminished at all pedal sites b/l.     DERMATOLOGIC: Diffuse dermatologic changes noted to b/l KYLER    Patient provided verbal consent for photos to be obtained today:    Right lower extremity:           Scattered abrasions noted along the anterior and lateral aspect of the right leg. Partial-thickness ulcerations noted to the lateral aspect of the right foot at the base of the fifth metatarsal and the head of the fifth metatarsal with a hyperkeratotic periwound. No acute signs of infection noted. No drainage noted. No fluctuance, crepitus, or malodor noted. No tunneling or tracking noted. Left lower extremity:        Scattered abrasions noted to the anterior aspect of the left leg. Incision site on the lateral aspect of the left foot skin edges noted to be well coapted with graft incorporation noted to the distal aspect of the surgical incision. No fluctuance, crepitus, or malodor noted. No tunneling or tracking noted. No active drainage noted. No acute signs of infection noted. MUSCULOSKELETAL: No pain to palpation of the foot or ankle b/l. Muscle strength 5/5 for all pedal muscle groups. Ankle joint ROM is decreased in dorsiflexion with the knee extended. No obvious biomechanical abnormalities    IMAGING:  XR Foot Left   Narrative   EXAM: LEFT FOOT 3 VIEWS       INDICATION: left foot partial 5th ray resection follow up       COMPARISON: 5/29/22       FINDINGS:       Bones are severely osteopenic limiting detail. 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 No acute fracture or malalignment. No definite erosion. Mild generalized swelling. Extensive vascular calcifications.           Impression       1.  No acute osseous findings.    2.  Post partial 5th ray and dictation.          ASSESSMENT/PLAN  - Partial thickness ulcerations, lateral right 5th digit (Bueno 1)  - S/p left foot partial 5th ray resection with graft application (DOS 38/12/0734)  - Superficial abrasions, bilateral lower extremity  - Peripheral Neuropathy, bilateral lower extremit     -Patient examined and evaluated at bedside   -VSS, NO leukocytosis noted (WBC 5.3)  -CRP <3, ESR 14  -Prealbumin 14.1 (oral nutrition supplement ordered), Hemoglobin A1c 5.7 (6/21)  -Wound culture not taken as no active drainage noted or acute signs of infection noted. -Imaging reviewed, impression noted above. -Dressing applied to bilatleral LE consisting of adaptic, DSD, and Ace with gentle compression  -Ammonium lactate ordered  -No Abx necessary from podiatric standpoint   -Patient is weightbearing as tolerated to bilateral lower extremity in protective shoe gear or postop shoe, but while at rest patient is to be continuously offloaded in Prevalon boots.      Dispo: Patient s/p left foot partial 5th ray resection with graft application (DOS 0/81/1706). No antibiotics necessary from podiatric standpoint. Patient is stable for discharge from podiatric standpoint pending medical clearance.     The patient assessment and plan was discussed with ANGELINA Soria DPM   Podiatric Resident PGY2  Pager [phone] or Ramandeep  7/4/2022, 8:52 AM

## 2022-07-04 NOTE — CARE COORDINATION
BAYRON faxed the contract \"Agreement for Infusion Services\" from [email or web address] to [email or web address] [facility] Updated the nursing supervisor,Makayla. Please see documentation from RN Peg Wiley for a complete account of challenges to this situation. If Mr. Darshana Mac is discharged back to [facility] tomorrow, the assisted living administration will need to contact [employer] to facilitate a contractual agreement of their own for nursing/pharmacy services to be completed there.     Electronically signed by DESI Laguna RN-[facility]  Case Management  [phone]

## 2022-07-04 NOTE — PLAN OF CARE
Problem: Chronic Conditions and Co-morbidities  Goal: Patient's chronic conditions and co-morbidity symptoms are monitored and maintained or improved  7/4/2022 0947 by Salty Barclay RN  Outcome: Progressing     Problem: Discharge Planning  Goal: Discharge to home or other facility with appropriate resources  7/4/2022 0947 by Salty Barclay RN  Outcome: Progressing     Problem: Pain  Goal: Verbalizes/displays adequate comfort level or baseline comfort level  7/4/2022 0947 by Salty Barclay RN  Outcome: Progressing

## 2022-07-04 NOTE — PLAN OF CARE
Problem: Chronic Conditions and Co-morbidities  Goal: Patient's chronic conditions and co-morbidity symptoms are monitored and maintained or improved  Outcome: Progressing     Problem: Discharge Planning  Goal: Discharge to home or other facility with appropriate resources  Outcome: Progressing     Problem: Pain  Goal: Verbalizes/displays adequate comfort level or baseline comfort level  Outcome: Progressing     Problem: Skin/Tissue Integrity  Goal: Absence of new skin breakdown  Description: 1. Monitor for areas of redness and/or skin breakdown  2. Assess vascular access sites hourly  3. Every 4-6 hours minimum:  Change oxygen saturation probe site  4. Every 4-6 hours:  If on nasal continuous positive airway pressure, respiratory therapy assess nares and determine need for appliance change or resting period. Outcome: Progressing  Note: Pt at risk for skin breakdown. Skin assessment complete. Breakdown noted to sacrum, buttocks. Inc paste applied. Legs wrapped by podiatry,  Pts skin cleansed with inc cleanser multiple times. Pt on specialty bed. Pt repositioned in bed with pillow support. Heels elevated in prevlon boots. Will continue to monitor. Problem: Safety - Adult  Goal: Free from fall injury  Outcome: Progressing  Note: Patient is a fall risk. See Fall Risk assessment for details. Bed is in low, lock position; call light/belongings within reach. No attempts to get out of bed have been made, calls appropriately when assistance is needed. Bed alarm and hourly rounds in place for safety. Will continue to monitor and reassess throughout shift.

## 2022-07-04 NOTE — PROGRESS NOTES
Progress Note    Admit Date: 6/28/2022  Day:   Diet: ADULT DIET; Regular; Low Fat/Low Chol/High Fiber/2 gm Na  ADULT ORAL NUTRITION SUPPLEMENT; Breakfast, Lunch, Dinner; Wound Healing Oral Supplement    CC: abdominal pain    Interval history: Pt seen and examined bedside. Pt is still complaining of abdominal pain as well as the swelling but it has improved. Pt denies SOB and reports no new symptoms. Medications:     Scheduled Meds:   miconazole   Topical BID    SMOG Enema  330 mL Rectal Once    polyethylene glycol  17 g Oral BID    fluconazole  150 mg Oral Weekly    furosemide  40 mg IntraVENous BID    cefTRIAXone (ROCEPHIN) IV  1,000 mg IntraVENous Q24H    aspirin  81 mg Oral Daily    atorvastatin  80 mg Oral Nightly    gabapentin  600 mg Oral BID    levothyroxine  75 mcg Oral QAM AC    pantoprazole  40 mg Oral QAM AC    tamsulosin  0.8 mg Oral Daily    sodium chloride flush  5-40 mL IntraVENous 2 times per day    enoxaparin  30 mg SubCUTAneous BID    traZODone  25 mg Oral Nightly     Continuous Infusions:   sodium chloride 25 mL (06/30/22 1143)     PRN Meds:miconazole, ammonium lactate, perflutren lipid microspheres, albuterol, sodium chloride flush, sodium chloride, ondansetron **OR** ondansetron, acetaminophen **OR** acetaminophen, melatonin    Objective:   Vitals:   T-max:  Patient Vitals for the past 8 hrs:   BP Temp Temp src Pulse Resp SpO2 Weight   07/04/22 0830 134/62 -- -- 63 16 -- --   07/04/22 0543 -- -- -- -- -- -- 236 lb 1.8 oz (107.1 kg)   07/04/22 0404 109/65 98.2 °F (36.8 °C) Oral 63 18 93 % --       Intake/Output Summary (Last 24 hours) at 7/4/2022 0906  Last data filed at 7/4/2022 0543  Gross per 24 hour   Intake 10 ml   Output 3200 ml   Net -3190 ml       Review of Systems negative    Physical Exam  Constitutional:       General: He is not in acute distress. Appearance: Normal appearance. He is not diaphoretic. HENT:      Head: Normocephalic and atraumatic.       Right Ear: External ear normal.      Left Ear: External ear normal.      Nose: No rhinorrhea. Eyes:      Extraocular Movements: Extraocular movements intact. Conjunctiva/sclera: Conjunctivae normal.   Cardiovascular:      Rate and Rhythm: Normal rate and regular rhythm. Heart sounds: Normal heart sounds. Pulmonary:      Effort: Pulmonary effort is normal.   Abdominal:      General: Bowel sounds are normal.      Tenderness: There is abdominal tenderness. There is no guarding or rebound. Genitourinary:     Comments: Scrotal swelling and rash   Musculoskeletal:      Comments: BL LE wrapped   Skin:     General: Skin is warm and dry. Neurological:      Mental Status: He is alert. Mental status is at baseline. LABS:    CBC:   Recent Labs     07/02/22  0739 07/03/22 0730 07/04/22  0732   WBC 7.5 6.4 5.3   HGB 10.2* 11.0* 10.6*   HCT 31.4* 34.1* 32.4*    170 156   MCV 94.4 94.7 94.8     Renal:    Recent Labs     07/02/22 0739 07/03/22 0730 07/04/22  0732    138 137   K 4.8 5.2* 5.7*    103 103   CO2 27 26 28   BUN 41* 49* 60*   CREATININE 1.0 1.0 0.9   GLUCOSE 86 92 86   CALCIUM 8.6 8.8 8.6   MG 1.90 2.00 2.10   ANIONGAP 5 9 6     Hepatic: No results for input(s): AST, ALT, BILITOT, BILIDIR, PROT, LABALBU, ALKPHOS in the last 72 hours. Troponin: No results for input(s): TROPONINI in the last 72 hours. BNP: No results for input(s): BNP in the last 72 hours. Lipids: No results for input(s): CHOL, HDL in the last 72 hours. Invalid input(s): LDLCALCU, TRIGLYCERIDE  ABGs:  No results for input(s): PHART, PLV9ROK, PO2ART, REI0EBE, BEART, THGBART, J4DQYZYW, ZUH1OJM in the last 72 hours. INR: No results for input(s): INR in the last 72 hours. Lactate: No results for input(s): LACTATE in the last 72 hours.   Cultures:  -----------------------------------------------------------------  RAD:   XR ABDOMEN (KUB) (SINGLE AP VIEW)   Final Result      Large amount of colonic stool      XR FOOT

## 2022-07-05 PROBLEM — I50.20 SYSTOLIC CONGESTIVE HEART FAILURE (HCC): Status: ACTIVE | Noted: 2020-01-13

## 2022-07-05 LAB
ANION GAP SERPL CALCULATED.3IONS-SCNC: 9 MMOL/L (ref 3–16)
BASOPHILS ABSOLUTE: 0 K/UL (ref 0–0.2)
BASOPHILS RELATIVE PERCENT: 0.8 %
BUN BLDV-MCNC: 66 MG/DL (ref 7–20)
CALCIUM SERPL-MCNC: 8.8 MG/DL (ref 8.3–10.6)
CHLORIDE BLD-SCNC: 97 MMOL/L (ref 99–110)
CO2: 30 MMOL/L (ref 21–32)
CREAT SERPL-MCNC: 1 MG/DL (ref 0.8–1.3)
EKG ATRIAL RATE: 61 BPM
EKG DIAGNOSIS: NORMAL
EKG P AXIS: 51 DEGREES
EKG P-R INTERVAL: 236 MS
EKG Q-T INTERVAL: 406 MS
EKG QRS DURATION: 90 MS
EKG QTC CALCULATION (BAZETT): 408 MS
EKG R AXIS: 34 DEGREES
EKG T AXIS: 59 DEGREES
EKG VENTRICULAR RATE: 61 BPM
EOSINOPHILS ABSOLUTE: 0.9 K/UL (ref 0–0.6)
EOSINOPHILS RELATIVE PERCENT: 15.4 %
GFR AFRICAN AMERICAN: >60
GFR NON-AFRICAN AMERICAN: >60
GLUCOSE BLD-MCNC: 146 MG/DL (ref 70–99)
HCT VFR BLD CALC: 35.5 % (ref 40.5–52.5)
HEMOGLOBIN: 11.4 G/DL (ref 13.5–17.5)
LYMPHOCYTES ABSOLUTE: 1.2 K/UL (ref 1–5.1)
LYMPHOCYTES RELATIVE PERCENT: 20.7 %
MAGNESIUM: 2.3 MG/DL (ref 1.8–2.4)
MCH RBC QN AUTO: 30.6 PG (ref 26–34)
MCHC RBC AUTO-ENTMCNC: 32 G/DL (ref 31–36)
MCV RBC AUTO: 95.5 FL (ref 80–100)
MONOCYTES ABSOLUTE: 0.3 K/UL (ref 0–1.3)
MONOCYTES RELATIVE PERCENT: 4.8 %
NEUTROPHILS ABSOLUTE: 3.3 K/UL (ref 1.7–7.7)
NEUTROPHILS RELATIVE PERCENT: 58.3 %
OSMOLALITY URINE: 297 MOSM/KG (ref 390–1070)
PDW BLD-RTO: 18.3 % (ref 12.4–15.4)
PLATELET # BLD: 163 K/UL (ref 135–450)
PMV BLD AUTO: 9.4 FL (ref 5–10.5)
POTASSIUM REFLEX MAGNESIUM: 4.5 MMOL/L (ref 3.5–5.1)
POTASSIUM, UR: 13.7 MMOL/L
RBC # BLD: 3.72 M/UL (ref 4.2–5.9)
SODIUM BLD-SCNC: 136 MMOL/L (ref 136–145)
SODIUM URINE: 96 MMOL/L
WBC # BLD: 5.7 K/UL (ref 4–11)

## 2022-07-05 PROCEDURE — 36415 COLL VENOUS BLD VENIPUNCTURE: CPT

## 2022-07-05 PROCEDURE — 1200000000 HC SEMI PRIVATE

## 2022-07-05 PROCEDURE — 99223 1ST HOSP IP/OBS HIGH 75: CPT | Performed by: INTERNAL MEDICINE

## 2022-07-05 PROCEDURE — 6360000002 HC RX W HCPCS: Performed by: STUDENT IN AN ORGANIZED HEALTH CARE EDUCATION/TRAINING PROGRAM

## 2022-07-05 PROCEDURE — 6370000000 HC RX 637 (ALT 250 FOR IP): Performed by: STUDENT IN AN ORGANIZED HEALTH CARE EDUCATION/TRAINING PROGRAM

## 2022-07-05 PROCEDURE — 93010 ELECTROCARDIOGRAM REPORT: CPT | Performed by: INTERNAL MEDICINE

## 2022-07-05 PROCEDURE — 80048 BASIC METABOLIC PNL TOTAL CA: CPT

## 2022-07-05 PROCEDURE — 85025 COMPLETE CBC W/AUTO DIFF WBC: CPT

## 2022-07-05 PROCEDURE — 6360000002 HC RX W HCPCS

## 2022-07-05 PROCEDURE — 6370000000 HC RX 637 (ALT 250 FOR IP)

## 2022-07-05 PROCEDURE — 84300 ASSAY OF URINE SODIUM: CPT

## 2022-07-05 PROCEDURE — 84133 ASSAY OF URINE POTASSIUM: CPT

## 2022-07-05 PROCEDURE — 83935 ASSAY OF URINE OSMOLALITY: CPT

## 2022-07-05 PROCEDURE — 2580000003 HC RX 258

## 2022-07-05 PROCEDURE — 83735 ASSAY OF MAGNESIUM: CPT

## 2022-07-05 RX ADMIN — Medication 5 MG: at 22:38

## 2022-07-05 RX ADMIN — POLYETHYLENE GLYCOL 3350 17 G: 17 POWDER, FOR SOLUTION ORAL at 20:43

## 2022-07-05 RX ADMIN — SODIUM CHLORIDE, PRESERVATIVE FREE 10 ML: 5 INJECTION INTRAVENOUS at 08:41

## 2022-07-05 RX ADMIN — ENOXAPARIN SODIUM 30 MG: 100 INJECTION SUBCUTANEOUS at 20:38

## 2022-07-05 RX ADMIN — MICONAZOLE NITRATE: 20 POWDER TOPICAL at 20:43

## 2022-07-05 RX ADMIN — TAMSULOSIN HYDROCHLORIDE 0.8 MG: 0.4 CAPSULE ORAL at 08:40

## 2022-07-05 RX ADMIN — FUROSEMIDE 80 MG: 10 INJECTION, SOLUTION INTRAMUSCULAR; INTRAVENOUS at 18:01

## 2022-07-05 RX ADMIN — CEFTRIAXONE 1000 MG: 1 INJECTION, POWDER, FOR SOLUTION INTRAMUSCULAR; INTRAVENOUS at 17:00

## 2022-07-05 RX ADMIN — SODIUM ZIRCONIUM CYCLOSILICATE 10 G: 10 POWDER, FOR SUSPENSION ORAL at 01:46

## 2022-07-05 RX ADMIN — MICONAZOLE NITRATE: 20 POWDER TOPICAL at 08:42

## 2022-07-05 RX ADMIN — ENOXAPARIN SODIUM 30 MG: 100 INJECTION SUBCUTANEOUS at 08:41

## 2022-07-05 RX ADMIN — SODIUM CHLORIDE, PRESERVATIVE FREE 5 ML: 5 INJECTION INTRAVENOUS at 20:46

## 2022-07-05 RX ADMIN — TRAZODONE HYDROCHLORIDE 25 MG: 50 TABLET ORAL at 22:38

## 2022-07-05 RX ADMIN — ASPIRIN 81 MG 81 MG: 81 TABLET ORAL at 08:40

## 2022-07-05 RX ADMIN — FUROSEMIDE 80 MG: 10 INJECTION, SOLUTION INTRAMUSCULAR; INTRAVENOUS at 08:41

## 2022-07-05 RX ADMIN — ATORVASTATIN CALCIUM 80 MG: 40 TABLET, FILM COATED ORAL at 20:38

## 2022-07-05 RX ADMIN — GABAPENTIN 600 MG: 600 TABLET, FILM COATED ORAL at 20:38

## 2022-07-05 RX ADMIN — PANTOPRAZOLE SODIUM 40 MG: 40 TABLET, DELAYED RELEASE ORAL at 05:13

## 2022-07-05 RX ADMIN — LEVOTHYROXINE SODIUM 75 MCG: 0.07 TABLET ORAL at 05:13

## 2022-07-05 RX ADMIN — GABAPENTIN 600 MG: 600 TABLET, FILM COATED ORAL at 08:41

## 2022-07-05 ASSESSMENT — PAIN SCALES - GENERAL: PAINLEVEL_OUTOF10: 0

## 2022-07-05 NOTE — PROGRESS NOTES
Podiatric Surgery Daily Progress Note  Roxana Block      Subjective :   Patient seen and examined this am at the bedside. Patient denies N/V/F/C/SOB. Patient denies calf pain, thigh pain, chest pain. Review of Systems: A 12 point review of symptoms is unremarkable with the exception of the chief complaint. Patient specifically denies nausea, fever, vomiting, chills, shortness of breath, chest pain, abdominal pain,or constipation     Objective     /78   Pulse 54   Temp 97.5 °F (36.4 °C) (Oral)   Resp 18   Ht 5' 11\" (1.803 m)   Wt 225 lb 12 oz (102.4 kg)   SpO2 96%   BMI 31.49 kg/m²      I/O:    Intake/Output Summary (Last 24 hours) at 7/5/2022 0927  Last data filed at 7/5/2022 0848  Gross per 24 hour   Intake 360 ml   Output 4300 ml   Net -3940 ml              Wt Readings from Last 3 Encounters:   07/05/22 225 lb 12 oz (102.4 kg)   06/02/22 210 lb 12.2 oz (95.6 kg)   04/22/22 232 lb 12.8 oz (105.6 kg)       LABS:    Recent Labs     07/03/22  0730 07/04/22  0732   WBC 6.4 5.3   HGB 11.0* 10.6*   HCT 34.1* 32.4*    156        Recent Labs     07/04/22  2152   *   K 5.9*   CL 99   CO2 26   PHOS 3.9   BUN 66*   CREATININE 1.0      No results for input(s): PROT, INR, APTT in the last 72 hours. LOWER EXTREMITY EXAMINATION    Dressing to bilateral LE intact. No strikethrough noted to the external dressing. No drainage noted to the internal layers of the dressing. VASCULAR: DP and PT pulses non-palpable B/L but upon doppler examination were found to be biphasic B/L. CFT is brisk to the digits of the foot b/l. Skin temperature is warm to warm from proximal to distal with no focal increase in temperature noted. No erythema noted. No edema noted to B/L LE 2/2 to compressive therapy. No pain with calf compression b/l.     NEUROLOGIC: Gross and epicritic sensation is intact b/l.  Protective sensation is diminished at all pedal sites b/l.     DERMATOLOGIC: Diffuse dermatologic changes noted to b/l LE. Patient provided verbal consent for photos to be obtained today:    Right lower extremity:         Scattered abrasions noted along the anterior and lateral aspect of the right leg. Partial-thickness ulcerations noted to the lateral aspect of the right foot at the base of the fifth metatarsal and the head of the fifth metatarsal with a hyperkeratotic periwound. No acute signs of infection noted. No drainage noted. No fluctuance, crepitus, or malodor noted. No tunneling or tracking noted. Left lower extremity:        Scattered abrasions noted to the anterior aspect of the left leg. Incision site on the lateral aspect of the left foot skin edges noted to be well coapted with graft incorporation noted to the distal aspect of the surgical incision. No fluctuance, crepitus, or malodor noted. No tunneling or tracking noted. No active drainage noted. No acute signs of infection noted. MUSCULOSKELETAL: No pain to palpation of the foot or ankle b/l. Muscle strength 5/5 for all pedal muscle groups. Ankle joint ROM is decreased in dorsiflexion with the knee extended. No obvious biomechanical abnormalities    IMAGING:  XR Foot Left   Narrative   EXAM: LEFT FOOT 3 VIEWS       INDICATION: left foot partial 5th ray resection follow up       COMPARISON: 5/29/22       FINDINGS:       Bones are severely osteopenic limiting detail. 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 No acute fracture or malalignment. No definite erosion. Mild generalized swelling. Extensive vascular calcifications.           Impression       1.  No acute osseous findings.    2.  Post partial 5th ray and dictation.          ASSESSMENT/PLAN  - Partial thickness ulcerations, lateral right 5th digit (Bueno 1)  - S/p left foot partial 5th ray resection with graft application (DOS 85/25/7929)  - Superficial abrasions, bilateral lower extremity  - Peripheral Neuropathy, bilateral lower extremit     -Patient examined and evaluated at bedside   -VSS, No updated CBC this AM  -CRP <3, ESR 14  -Prealbumin 14.1 (oral nutrition supplement ordered), Hemoglobin A1c 5.7 (6/21)  -Wound culture not taken as no active drainage noted or acute signs of infection noted. -Imaging reviewed, impression noted above. -Dressing applied to bilatleral LE consisting of adaptic, DSD, and Ace with gentle compression  -Advise against putting Minerin Cream in between toes. This will cause maceration of the digits. Discontinued nursing communication for applying Minerin Cream to the toes. -No Abx necessary from podiatric standpoint   -Patient is weightbearing as tolerated to bilateral lower extremity in protective shoe gear or postop shoe, but while at rest patient is to be continuously offloaded in [address] [place] [address]: Patient s/p left foot partial 5th ray resection with graft application (DOS 4/17/7862). No antibiotics necessary from podiatric standpoint. Patient is stable for discharge from podiatric standpoint pending medical clearance. The patient assessment and plan was discussed with ANGELINA Trimble DPM   Podiatric Resident PGY2  Pager [phone] or [employer]  7/5/2022, 9:27 AM      Patient was seen and evaluated at bedside. Agree with residents assessment and treatment plan.   Cari Rutledge DPM

## 2022-07-05 NOTE — CARE COORDINATION
CM received notification letter via fax from [facility] stating that their nurse, Rudi Garcia will ome to [facility] on 7/8 at 1:00pm to assist with refilling the intrathecal pain pump. CM will place copy of fax in pt's chart.         Electronically signed by ARIC Zavala, ARIELW on 7/5/2022 at 12:47 PM

## 2022-07-05 NOTE — PROGRESS NOTES
Progress Note    Admit Date: 6/28/2022  Day: 6  Diet: ADULT ORAL NUTRITION SUPPLEMENT; Dinner, Breakfast; Low Calorie/High Protein Oral Supplement  ADULT ORAL NUTRITION SUPPLEMENT; Lunch, Dinner; Wound Healing Oral Supplement  ADULT DIET; Regular; Low Fat/Low Chol/High Fiber/2 gm Na; Low Potassium (Less than 3000 mg/day)    CC: L abdominal pain    Interval history: [de-identified] yo male resting in bed. Pt still complaining of abdominal discomfort. Pt reports no new symptoms, has no other concerns or complaints. Pt is receiving Lasix for his swelling and IV CTX for the UTI.       Medications:     Scheduled Meds:   furosemide  80 mg IntraVENous BID    miconazole   Topical BID    SMOG Enema  330 mL Rectal Once    polyethylene glycol  17 g Oral BID    fluconazole  150 mg Oral Weekly    cefTRIAXone (ROCEPHIN) IV  1,000 mg IntraVENous Q24H    aspirin  81 mg Oral Daily    atorvastatin  80 mg Oral Nightly    gabapentin  600 mg Oral BID    levothyroxine  75 mcg Oral QAM AC    pantoprazole  40 mg Oral QAM AC    tamsulosin  0.8 mg Oral Daily    sodium chloride flush  5-40 mL IntraVENous 2 times per day    enoxaparin  30 mg SubCUTAneous BID    traZODone  25 mg Oral Nightly     Continuous Infusions:   PLACEHOLDER - baclofen intrathecal pump      sodium chloride 25 mL (06/30/22 1143)     PRN Meds:miconazole, ammonium lactate, perflutren lipid microspheres, albuterol, sodium chloride flush, sodium chloride, ondansetron **OR** ondansetron, acetaminophen **OR** acetaminophen, melatonin    Objective:   Vitals:   T-max:  Patient Vitals for the past 8 hrs:   BP Temp Temp src Pulse Resp SpO2 Weight   07/05/22 0516 -- -- -- -- -- -- 225 lb 12 oz (102.4 kg)   07/05/22 0513 128/77 97.7 °F (36.5 °C) Oral 60 18 94 % --       Intake/Output Summary (Last 24 hours) at 7/5/2022 0839  Last data filed at 7/5/2022 0517  Gross per 24 hour   Intake 240 ml   Output 4300 ml   Net -4060 ml       Review of Systems    Physical Exam  Constitutional:       General: He is not in acute distress.     Appearance: Normal appearance. He is not diaphoretic. HENT:      Head: Normocephalic and atraumatic.      Right Ear: External ear normal.      Left Ear: External ear normal.      Nose: No rhinorrhea. Eyes:      Extraocular Movements: Extraocular movements intact.      Conjunctiva/sclera: Conjunctivae normal.   Cardiovascular:      Rate and Rhythm: Normal rate and regular rhythm.      Heart sounds: Normal heart sounds. Pulmonary:      Effort: Pulmonary effort is normal.   Abdominal:      General: Bowel sounds are normal.      Tenderness: There is abdominal tenderness. There is no guarding or rebound. Genitourinary:     Comments: Scrotal swelling and rash   Musculoskeletal:      Comments: BL LE wrapped   Skin:     General: Skin is warm and dry. Neurological:      Mental Status: He is alert. Mental status is at baseline. LABS:    CBC:   Recent Labs     07/03/22  0730 07/04/22 0732   WBC 6.4 5.3   HGB 11.0* 10.6*   HCT 34.1* 32.4*    156   MCV 94.7 94.8     Renal:    Recent Labs     07/03/22  0730 07/03/22 0730 07/04/22  0731 07/04/22 0732 07/04/22 1757 07/04/22  2152      < > 138 137 134* 135*   K 5.2*   < > 5.7* 5.7* 6.2* 5.9*      < > 102 103 99 99   CO2 26   < > 25 28 21 26   BUN 49*   < > 63* 60* 65* 66*   CREATININE 1.0   < > 1.0 0.9 1.0 1.0   GLUCOSE 92   < > 88 86 125* 119*   CALCIUM 8.8   < > 8.7 8.6 8.7 8.7   MG 2.00  --   --  2.10  --   --    PHOS  --   --  3.8  --  4.0 3.9   ANIONGAP 9   < > 11 6 14 10    < > = values in this interval not displayed. Hepatic:   Recent Labs     07/04/22  0731 07/04/22 1757 07/04/22  2152   LABALBU 3.4 3.1* 3.4     Troponin: No results for input(s): TROPONINI in the last 72 hours. BNP: No results for input(s): BNP in the last 72 hours. Lipids: No results for input(s): CHOL, HDL in the last 72 hours.     Invalid input(s): LDLCALCU, TRIGLYCERIDE  ABGs:  No results for input(s): PHART, PXO4HBM, PO2ART, OAM9ACZ, BEART, THGBART, W4GJIJGA, RSL1PDO in the last 72 hours. INR: No results for input(s): INR in the last 72 hours. Lactate: No results for input(s): LACTATE in the last 72 hours. Cultures:  -----------------------------------------------------------------  RAD:   XR ABDOMEN (KUB) (SINGLE AP VIEW)   Final Result      Large amount of colonic stool      XR FOOT LEFT (MIN 3 VIEWS)   Final Result      1. No acute osseous findings. 2.  Post partial 5th ray and dictation. Assessment/Plan:   Dysuria 2/2 acute complicated cystitis  Hx of MDRO UTI  Chronic indwelling schneider 2/2 neurogenic bladder  Scrotal swelling  H/O Proteus MDRO UTI 4/21, had multiple UTI with E. Fecalis, proteus, pseudomonas, candida in the past   Chronic indwelling catheter since 2019 for neurogenic bladder  UA with 10-20 WBC, positive nitrite, moderate LE and 4+ bacteria     -IV ceftriaxone. cefepime, vanc discontinued.  -Lasix 80 mg IV bid  -Elevate scrotum     Hyperkalemia  K 5.2 > 5.7 > 5.9  -continue lasix   -consider lokelma     Intertrigo likely 2/2 fungal rash  - Cont diflucan  - Miconazole powder to keep area dry     Abdominal pain and bloating 2/2 constipation  KUB with considerable stool burden.   - scheduled glycolax BID     Hypoalbuminemia  Prealbumin 14.1  -protein supplementation      Lower extremity wounds   Left MT osteomyelitis s/p L partial ray resection  MRSA +ve wound cx on 05/25/22   B/L LE wrapped in dressings   Was recently admitted 5/25/22 and treated with abx   -Podiatry seeing  -wound care  -Does not look infected     Normocytic anemia  Likely 2/2 iron deficiency  Low RBCs, high RDW,   Iron 57, ferritin 32.9 1/18/22     Pt is bed bound - PT, OT eval ordered.     Chronic medical problems   CAD s/p CABG in 2013   -continue home aspirin 81 mg daily   -continue lipitor 80 mg nightly      Hypothyroidism   -continue home synthyroid 75 mcg      BPH  -continue home flomax 0.8 mg PO daily   -chronic indwelling foleys catheter      Mood disorders   -continue home trazodone 25 mg PO nightly       Code Status:Full code  FEN: ADULT DIET;  Regular; Low Fat/Low Chol/High Fiber/2 gm Na  PPX: lovenox   DISPO: Radha Barnett MD, PGY-1  07/05/22  8:39 AM    This patient has been staffed and discussed with Duane Shires, MD.

## 2022-07-05 NOTE — PLAN OF CARE
Problem: Chronic Conditions and Co-morbidities  Goal: Patient's chronic conditions and co-morbidity symptoms are monitored and maintained or improved  Outcome: Progressing     Problem: Discharge Planning  Goal: Discharge to home or other facility with appropriate resources  Outcome: Progressing     Problem: Pain  Goal: Verbalizes/displays adequate comfort level or baseline comfort level  Outcome: Progressing     Problem: Skin/Tissue Integrity  Goal: Absence of new skin breakdown  Description: 1. Monitor for areas of redness and/or skin breakdown  2. Assess vascular access sites hourly  3. Every 4-6 hours minimum:  Change oxygen saturation probe site  4. Every 4-6 hours:  If on nasal continuous positive airway pressure, respiratory therapy assess nares and determine need for appliance change or resting period. Outcome: Progressing  Note: Pt at risk for skin breakdown. Skin assessment complete. Pts skin cleansed with inc cleanser. Pt repositioned in bed with pillow support. Prevlon boots on. Pt on specialty bed. Will continue to monitor. Problem: Safety - Adult  Goal: Free from fall injury  Outcome: Progressing  Note: Patient is a fall risk. See Fall Risk assessment for details. Bed is in low, lock position; call light/belongings within reach. No attempts to get out of bed have been made, calls appropriately when assistance is needed. Bed alarm and hourly rounds in place for safety. Will continue to monitor and reassess throughout shift.

## 2022-07-05 NOTE — CONSULTS
Nephrology Consult Note                                                                                                                                                                                                                                                                                                                                                               Office : [phone]     Fax :[phone]              Patient's Name: Stormy Perez  7/5/2022    Reason for Consult:  Hyperkalemia   Requesting Physician:  INES BLANDON [address]      Chief Complaint:  Dysuria     History of Present Ilness:    Stormy Perez is a [de-identified]years old male with PMH of CAD s/p CABG (2013), NM disorder with spasticity, LE cellulitis, Proteus MDRO UTI in 4/21, BPH and neurogenic bladder with chronic indwelling cathter in 2019 who presented to the ED from his assisted living facility with complaints of pain around his foleys catheter site while passing urine  K is elevated       Past Medical History:   Diagnosis Date    BPH (benign prostatic hyperplasia)     C. difficile colitis 04/11/2022    Carotid stenosis 12/2013    JESU 02-54% stenosis; LICA 45-73% stenosis    Cellulitis 12/2013    LLE    [facility]estive [facility] failure) [facility] [address]     Chronic back pain     Diverticular disease     Encounter for imaging to screen for metal prior to MRI 05/26/2022    [employer]: Synchromed II pump for baclofen -lfe    GERD (gastroesophageal reflux disease)     History of atrial fibrillation     Hypercholesteremia     Hypertension     Lower GI bleed     MDRO (multiple drug resistant organisms) resistance 10/26/2019    urine    Neuromuscular disorder (HCC)     spasticity    Renal insufficiency     Septic arthritis of interphalangeal joint of toe of right foot ([address] 5/27/2022    Vitamin B12 deficiency        Past Surgical History:   Procedure Laterality Date    BACK SURGERY  2006    lower lumbar    CORONARY ARTERY BYPASS GRAFT 12/13/2013    CABG x 5 (Dr Lexi Lake), svg to diag, om1 and 3, distal rca, kelly to lad.  CYSTOSCOPY N/A 1/10/2019    CYSTOSCOPY performed by Sherle Olszewski, MD at [address] Left 5/29/2022    LEFT FOOT PARTIAL FIFTH RAY RESECTION WITH EXCISIONAL DEBRIDEMENT OF MUSCLE AND FASCIA, WITH APPLICATION OF GRAFT performed by Ronnie Peace DPM at [address] [facility] HIP SURGERY Right 5/1/2015    ORIF    LEG SURGERY Right 11/2/2021    RIGHT LOWER EXTREMITY ADVANCEMENT FLAP AND SPLIT THICKNESS SKIN GRAFT PLACEMENT; (WOUND- 10 CM X 5.5 CM; CLOSURE- 6 CM X 5.5 CM; SKIN GRAFT- 7.2 CM X 5 CM) performed by Kailash Ruiz MD at [address] [facility] N/A 6/11/2020    [facility] performed by Maria Elena Mcneill MD at [facility] N/A 5/4/2020    EGD BIOPSY performed by Maria Elena Mcneill MD at [facility] ENDOSCOPY       Family History   Problem Relation Age of Onset    Heart Disease Father         reports that he quit smoking about 52 years ago. He has never used smokeless tobacco. He reports that he does not drink alcohol and does not use drugs.     Allergies:  Bactrim [sulfamethoxazole-trimethoprim]    Current Medications:    baclofen intrathecal pump - PLACEDHOLDER, Continuous  miconazole (MICOTIN) 2 % cream, PRN  ammonium lactate (LAC-HYDRIN) 12 % lotion, PRN  furosemide (LASIX) injection 80 mg, BID  miconazole (MICOTIN) 2 % powder, BID  SMOG Enema, Once  polyethylene glycol (GLYCOLAX) packet 17 g, BID  perflutren lipid microspheres (DEFINITY) injection 1.65 mg, ONCE PRN  fluconazole (DIFLUCAN) tablet 150 mg, Weekly  cefTRIAXone (ROCEPHIN) 1000 mg IVPB in 50 mL D5W minibag, Q24H  albuterol (PROVENTIL) nebulizer solution 2.5 mg, Q4H PRN  aspirin chewable tablet 81 mg, Daily  atorvastatin (LIPITOR) tablet 80 mg, Nightly  gabapentin (NEURONTIN) tablet 600 mg, BID  levothyroxine (SYNTHROID) tablet 75 mcg, QAM AC  pantoprazole (PROTONIX) tablet 40 mg, QAM AC  tamsulosin (FLOMAX) capsule 0.8 mg, Daily  sodium chloride flush 0.9 % injection 5-40 mL, 2 times per day  sodium chloride flush 0.9 % injection 5-40 mL, PRN  0.9 % sodium chloride infusion, PRN  enoxaparin Sodium (LOVENOX) injection 30 mg, BID  ondansetron (ZOFRAN-ODT) disintegrating tablet 4 mg, Q8H PRN   Or  ondansetron (ZOFRAN) injection 4 mg, Q6H PRN  acetaminophen (TYLENOL) tablet 650 mg, Q6H PRN   Or  acetaminophen (TYLENOL) suppository 650 mg, Q6H PRN  traZODone (DESYREL) tablet 25 mg, Nightly  melatonin disintegrating tablet 5 mg, Nightly PRN        Review of Systems:   14 point ROS obtained but were negative except mentioned in HPI      Physical exam:     Vitals:  /68   Pulse 62   Temp 98 °F (36.7 °C) (Oral)   Resp 18   Ht 5' 11\" (1.803 m)   Wt 225 lb 12 oz (102.4 kg)   SpO2 98%   BMI 31.49 kg/m²   Constitutional:  OAA X3 NAD  Skin: no rash, turgor wnl  Heent:  eomi, mmm  Neck: no bruits or jvd noted  Cardiovascular:  S1, S2 without m/r/g  Respiratory: CTA B without w/r/r  Abdomen:  +bs, soft, nt, nd  Ext: + lower extremity edema  Psychiatric: mood and affect appropriate  Musculoskeletal:  Rom, muscular strength intact    Data:   Labs:  CBC:   Recent Labs     07/03/22  0730 07/04/22  0732 07/05/22  0923   WBC 6.4 5.3 5.7   HGB 11.0* 10.6* 11.4*    156 163     BMP:    Recent Labs     07/04/22  1757 07/04/22  2152 07/05/22  0923   * 135* 136   K 6.2* 5.9* 4.5   CL 99 99 97*   CO2 21 26 30   BUN 65* 66* 66*   CREATININE 1.0 1.0 1.0   GLUCOSE 125* 119* 146*     Ca/Mg/Phos:   Recent Labs     07/03/22  0730 07/03/22  0730 07/04/22  0731 07/04/22  0731 07/04/22  0732 07/04/22  0732 07/04/22  1757 07/04/22 2152 07/05/22  0923   CALCIUM 8.8   < > 8.7   < > 8.6   < > 8.7 8.7 8.8   MG 2.00  --   --   --  2.10  --   --   --  2.30   PHOS  --   --  3.8  --   --   --  4.0 3.9  --     < > = values in this interval not displayed.      Hepatic: No results for input(s): AST, ALT, ALB, BILITOT, ALKPHOS in the last 72 hours. Troponin: No results for input(s): TROPONINI in the last 72 hours. BNP: No results for input(s): BNP in the last 72 hours. Lipids: No results for input(s): CHOL, TRIG, HDL, LDLCALC, LABVLDL in the last 72 hours. ABGs: No results for input(s): PHART, PO2ART, GQE5NEV in the last 72 hours. INR: No results for input(s): INR in the last 72 hours. UA:No results for input(s): Sol Zackary, GLUCOSEU, BILIRUBINUR, [place]ario [place], BLOODU, PHUR, PROTEINU, UROBILINOGEN, NITRU, LEUKOCYTESUR, LABMICR, URINETYPE in the last 72 hours. Urine Microscopic: No results for input(s): LABCAST, BACTERIA, COMU, HYALCAST, WBCUA, RBCUA, EPIU in the last 72 hours. Urine Culture: No results for input(s): LABURIN in the last 72 hours. Urine Chemistry:   Recent Labs     07/05/22  1033   NAUR 96             IMAGING:  XR ABDOMEN (KUB) (SINGLE AP VIEW)   Final Result      Large amount of colonic stool      XR FOOT LEFT (MIN 3 VIEWS)   Final Result      1. No acute osseous findings. 2.  Post partial 5th ray and dictation. Assessment/Plan   1. Hyperkalemia     2. HTN    3. Anemia    4. Acid- base/ Electrolyte imbalance     5.  Ac cystitis     Plan   - Abx   - Low K diet   - cont diuretics  - BNP in am   - inc protein intake   - labs in am       Recommend to dose adjust all medications  based on renal functions  Maintain SBP> 90 mmHg   Daily weights   AVOID NSAIDs  Avoid Nephrotoxins  Monitor Intake/Output  Call if significant decrease in urine output                   Thank you for allowing us to participate in care of Evelyn Loo MD  Feel free to contact me   [facility] [address]  Office : [phone]  Fax :[phone]

## 2022-07-06 LAB
ANION GAP SERPL CALCULATED.3IONS-SCNC: 8 MMOL/L (ref 3–16)
BASOPHILS ABSOLUTE: 0.1 K/UL (ref 0–0.2)
BASOPHILS RELATIVE PERCENT: 1.1 %
BUN BLDV-MCNC: 69 MG/DL (ref 7–20)
CALCIUM SERPL-MCNC: 8.4 MG/DL (ref 8.3–10.6)
CHLORIDE BLD-SCNC: 96 MMOL/L (ref 99–110)
CO2: 31 MMOL/L (ref 21–32)
CREAT SERPL-MCNC: 1.1 MG/DL (ref 0.8–1.3)
CREATININE URINE: 41.6 MG/DL (ref 39–259)
EOSINOPHILS ABSOLUTE: 0.8 K/UL (ref 0–0.6)
EOSINOPHILS RELATIVE PERCENT: 13.8 %
FERRITIN: 80.1 NG/ML (ref 30–400)
GFR AFRICAN AMERICAN: >60
GFR NON-AFRICAN AMERICAN: >60
GLUCOSE BLD-MCNC: 91 MG/DL (ref 70–99)
HCT VFR BLD CALC: 31.4 % (ref 40.5–52.5)
HEMOGLOBIN: 10.2 G/DL (ref 13.5–17.5)
IRON SATURATION: 23 % (ref 20–50)
IRON: 49 UG/DL (ref 59–158)
LYMPHOCYTES ABSOLUTE: 1.6 K/UL (ref 1–5.1)
LYMPHOCYTES RELATIVE PERCENT: 26.8 %
MAGNESIUM: 2.4 MG/DL (ref 1.8–2.4)
MCH RBC QN AUTO: 30.7 PG (ref 26–34)
MCHC RBC AUTO-ENTMCNC: 32.4 G/DL (ref 31–36)
MCV RBC AUTO: 94.8 FL (ref 80–100)
MONOCYTES ABSOLUTE: 0.6 K/UL (ref 0–1.3)
MONOCYTES RELATIVE PERCENT: 9.5 %
NEUTROPHILS ABSOLUTE: 2.9 K/UL (ref 1.7–7.7)
NEUTROPHILS RELATIVE PERCENT: 48.8 %
PDW BLD-RTO: 18.2 % (ref 12.4–15.4)
PLATELET # BLD: 143 K/UL (ref 135–450)
PMV BLD AUTO: 9.3 FL (ref 5–10.5)
POTASSIUM REFLEX MAGNESIUM: 4.8 MMOL/L (ref 3.5–5.1)
PRO-BNP: 415 PG/ML (ref 0–449)
PROTEIN PROTEIN: 13 MG/DL
PROTEIN/CREAT RATIO: 0.3 MG/DL
RBC # BLD: 3.31 M/UL (ref 4.2–5.9)
SODIUM BLD-SCNC: 135 MMOL/L (ref 136–145)
TOTAL IRON BINDING CAPACITY: 210 UG/DL (ref 260–445)
WBC # BLD: 5.9 K/UL (ref 4–11)

## 2022-07-06 PROCEDURE — 82570 ASSAY OF URINE CREATININE: CPT

## 2022-07-06 PROCEDURE — 6370000000 HC RX 637 (ALT 250 FOR IP)

## 2022-07-06 PROCEDURE — 6360000002 HC RX W HCPCS

## 2022-07-06 PROCEDURE — 6370000000 HC RX 637 (ALT 250 FOR IP): Performed by: STUDENT IN AN ORGANIZED HEALTH CARE EDUCATION/TRAINING PROGRAM

## 2022-07-06 PROCEDURE — 51702 INSERT TEMP BLADDER CATH: CPT

## 2022-07-06 PROCEDURE — 99233 SBSQ HOSP IP/OBS HIGH 50: CPT | Performed by: INTERNAL MEDICINE

## 2022-07-06 PROCEDURE — 36415 COLL VENOUS BLD VENIPUNCTURE: CPT

## 2022-07-06 PROCEDURE — 83550 IRON BINDING TEST: CPT

## 2022-07-06 PROCEDURE — 83540 ASSAY OF IRON: CPT

## 2022-07-06 PROCEDURE — 82728 ASSAY OF FERRITIN: CPT

## 2022-07-06 PROCEDURE — 1200000000 HC SEMI PRIVATE

## 2022-07-06 PROCEDURE — 6360000002 HC RX W HCPCS: Performed by: STUDENT IN AN ORGANIZED HEALTH CARE EDUCATION/TRAINING PROGRAM

## 2022-07-06 PROCEDURE — 2580000003 HC RX 258

## 2022-07-06 PROCEDURE — 83735 ASSAY OF MAGNESIUM: CPT

## 2022-07-06 PROCEDURE — 80048 BASIC METABOLIC PNL TOTAL CA: CPT

## 2022-07-06 PROCEDURE — 85025 COMPLETE CBC W/AUTO DIFF WBC: CPT

## 2022-07-06 PROCEDURE — 83880 ASSAY OF NATRIURETIC PEPTIDE: CPT

## 2022-07-06 PROCEDURE — 84156 ASSAY OF PROTEIN URINE: CPT

## 2022-07-06 RX ADMIN — MICONAZOLE NITRATE: 20 POWDER TOPICAL at 08:34

## 2022-07-06 RX ADMIN — LEVOTHYROXINE SODIUM 75 MCG: 0.07 TABLET ORAL at 06:07

## 2022-07-06 RX ADMIN — FUROSEMIDE 80 MG: 10 INJECTION, SOLUTION INTRAMUSCULAR; INTRAVENOUS at 17:50

## 2022-07-06 RX ADMIN — ATORVASTATIN CALCIUM 80 MG: 40 TABLET, FILM COATED ORAL at 20:06

## 2022-07-06 RX ADMIN — GABAPENTIN 600 MG: 600 TABLET, FILM COATED ORAL at 08:25

## 2022-07-06 RX ADMIN — ACETAMINOPHEN 325MG 650 MG: 325 TABLET ORAL at 23:21

## 2022-07-06 RX ADMIN — SODIUM CHLORIDE, PRESERVATIVE FREE 10 ML: 5 INJECTION INTRAVENOUS at 20:11

## 2022-07-06 RX ADMIN — ASPIRIN 81 MG 81 MG: 81 TABLET ORAL at 08:25

## 2022-07-06 RX ADMIN — TRAZODONE HYDROCHLORIDE 25 MG: 50 TABLET ORAL at 20:06

## 2022-07-06 RX ADMIN — PANTOPRAZOLE SODIUM 40 MG: 40 TABLET, DELAYED RELEASE ORAL at 06:07

## 2022-07-06 RX ADMIN — FUROSEMIDE 80 MG: 10 INJECTION, SOLUTION INTRAMUSCULAR; INTRAVENOUS at 08:25

## 2022-07-06 RX ADMIN — POLYETHYLENE GLYCOL 3350 17 G: 17 POWDER, FOR SOLUTION ORAL at 20:07

## 2022-07-06 RX ADMIN — POLYETHYLENE GLYCOL 3350 17 G: 17 POWDER, FOR SOLUTION ORAL at 08:35

## 2022-07-06 RX ADMIN — ENOXAPARIN SODIUM 30 MG: 100 INJECTION SUBCUTANEOUS at 08:29

## 2022-07-06 RX ADMIN — CEFTRIAXONE 1000 MG: 1 INJECTION, POWDER, FOR SOLUTION INTRAMUSCULAR; INTRAVENOUS at 16:01

## 2022-07-06 RX ADMIN — MICONAZOLE NITRATE: 20 POWDER TOPICAL at 20:09

## 2022-07-06 RX ADMIN — ACETAMINOPHEN 325MG 650 MG: 325 TABLET ORAL at 01:58

## 2022-07-06 RX ADMIN — SODIUM CHLORIDE, PRESERVATIVE FREE 10 ML: 5 INJECTION INTRAVENOUS at 08:29

## 2022-07-06 RX ADMIN — TAMSULOSIN HYDROCHLORIDE 0.8 MG: 0.4 CAPSULE ORAL at 08:25

## 2022-07-06 RX ADMIN — ENOXAPARIN SODIUM 30 MG: 100 INJECTION SUBCUTANEOUS at 20:54

## 2022-07-06 RX ADMIN — GABAPENTIN 600 MG: 600 TABLET, FILM COATED ORAL at 20:54

## 2022-07-06 RX ADMIN — Medication 5 MG: at 23:22

## 2022-07-06 ASSESSMENT — PAIN DESCRIPTION - PAIN TYPE: TYPE: CHRONIC PAIN

## 2022-07-06 ASSESSMENT — PAIN DESCRIPTION - LOCATION: LOCATION: LEG

## 2022-07-06 ASSESSMENT — PAIN DESCRIPTION - DESCRIPTORS: DESCRIPTORS: ACHING

## 2022-07-06 ASSESSMENT — PAIN SCALES - GENERAL
PAINLEVEL_OUTOF10: 7
PAINLEVEL_OUTOF10: 1

## 2022-07-06 ASSESSMENT — PAIN DESCRIPTION - ORIENTATION: ORIENTATION: RIGHT;LEFT

## 2022-07-06 ASSESSMENT — PAIN - FUNCTIONAL ASSESSMENT: PAIN_FUNCTIONAL_ASSESSMENT: PREVENTS OR INTERFERES SOME ACTIVE ACTIVITIES AND ADLS

## 2022-07-06 ASSESSMENT — PAIN DESCRIPTION - FREQUENCY: FREQUENCY: INTERMITTENT

## 2022-07-06 ASSESSMENT — PAIN SCALES - WONG BAKER: WONGBAKER_NUMERICALRESPONSE: 0

## 2022-07-06 ASSESSMENT — PAIN DESCRIPTION - ONSET: ONSET: GRADUAL

## 2022-07-06 NOTE — PROGRESS NOTES
Occupational Therapy/ Physical Therapy   No Treatment Note     Recopied from 6/29 note    OT/PT orders received, chart reviewed. Pt well known to PT/OT services with last recent admission 5/25-6/2.   Pt lives at [facility].  Per Rn at [facility] pt is non-ambulatory at baseline, raymundo lift is used to transfer patient. Alex Mackey is dependent with all ADLs in bed.  Pt is not appropriate for acute OT/PT evaluations or treatment.  Anticipate pt will return to prior setting with prior care at discharge.  Will sign off.  RN informed      Agustín Barajas  MS, OTR/L #[id number]

## 2022-07-06 NOTE — PROGRESS NOTES
Nephrology  Note                                                                                                                                                                                                                                                                                                                                                               Office : [phone]     Fax :[phone]              Patient's Name: Vianey Ruiz  7/6/2022    Reason for Consult:  Hyperkalemia   Requesting Physician:  Didi TSANG      07/06/22     K better   No N/V/D   Good UO         Past Medical History:   Diagnosis Date    BPH (benign prostatic hyperplasia)     C. difficile colitis 04/11/2022    Carotid stenosis 12/2013    JESU 40-72% stenosis; LICA 83-06% stenosis    Cellulitis 12/2013    LLE    [facility]congestive [facility] failure) ([facility] [address]     Chronic back pain     Diverticular disease     Encounter for imaging to screen for metal prior to MRI 05/26/2022    [employer]: Synchromed II pump for baclofen -lfe    GERD (gastroesophageal reflux disease)     History of atrial fibrillation     Hypercholesteremia     Hypertension     Lower GI bleed     MDRO (multiple drug resistant organisms) resistance 10/26/2019    urine    Neuromuscular disorder (HCC)     spasticity    Renal insufficiency     Septic arthritis of interphalangeal joint of toe of right foot ([facility] [address] 5/27/2022    Vitamin B12 deficiency        Past Surgical History:   Procedure Laterality Date    BACK SURGERY  2006    lower lumbar    CORONARY ARTERY BYPASS GRAFT  12/13/2013    CABG x 5 (Dr Niyah Ruano), svg to diag, om1 and 3, distal rca, kelly to lad.      CYSTOSCOPY N/A 1/10/2019    CYSTOSCOPY performed by Yuliet Pedro MD at [address] Left 5/29/2022    LEFT FOOT PARTIAL FIFTH RAY RESECTION WITH EXCISIONAL DEBRIDEMENT OF MUSCLE AND FASCIA, WITH APPLICATION OF GRAFT performed by Jia Sims DPM at [address] [place] [address] E SURGERY      HIP SURGERY Right 5/1/2015    ORIF    LEG SURGERY Right 11/2/2021    RIGHT LOWER EXTREMITY ADVANCEMENT FLAP AND SPLIT THICKNESS SKIN GRAFT PLACEMENT; (WOUND- 10 CM X 5.5 CM; CLOSURE- 6 CM X 5.5 CM; SKIN GRAFT- 7.2 CM X 5 CM) performed by Mat Sevilla MD at [address] N/A 6/11/2020    [address] performed by Jeffrye Alarcon MD at [address] [facility] [place] N/A 5/4/2020    EGD BIOPSY performed by Jeffrey Alarcon MD at [address] ENDOSCOPY       Family History   Problem Relation Age of Onset    Heart Disease Father         reports that he quit smoking about 52 years ago. He has never used smokeless tobacco. He reports that he does not drink alcohol and does not use drugs.     Allergies:  Bactrim [sulfamethoxazole-trimethoprim]    Current Medications:    baclofen intrathecal pump - PLACEDHOLDER, Continuous  miconazole (MICOTIN) 2 % cream, PRN  ammonium lactate (LAC-HYDRIN) 12 % lotion, PRN  furosemide (LASIX) injection 80 mg, BID  miconazole (MICOTIN) 2 % powder, BID  SMOG Enema, Once  polyethylene glycol (GLYCOLAX) packet 17 g, BID  perflutren lipid microspheres (DEFINITY) injection 1.65 mg, ONCE PRN  fluconazole (DIFLUCAN) tablet 150 mg, Weekly  cefTRIAXone (ROCEPHIN) 1000 mg IVPB in 50 mL D5W minibag, Q24H  albuterol (PROVENTIL) nebulizer solution 2.5 mg, Q4H PRN  aspirin chewable tablet 81 mg, Daily  atorvastatin (LIPITOR) tablet 80 mg, Nightly  gabapentin (NEURONTIN) tablet 600 mg, BID  levothyroxine (SYNTHROID) tablet 75 mcg, QAM AC  pantoprazole (PROTONIX) tablet 40 mg, QAM AC  tamsulosin (FLOMAX) capsule 0.8 mg, Daily  sodium chloride flush 0.9 % injection 5-40 mL, 2 times per day  sodium chloride flush 0.9 % injection 5-40 mL, PRN  0.9 % sodium chloride infusion, PRN  enoxaparin Sodium (LOVENOX) injection 30 mg, BID  ondansetron (ZOFRAN-ODT) disintegrating tablet 4 mg, Q8H PRN   Or  ondansetron (ZOFRAN) injection 4 mg, Q6H PRN  acetaminophen (TYLENOL) tablet 650 mg, Q6H PRN   Or  acetaminophen (TYLENOL) suppository 650 mg, Q6H PRN  traZODone (DESYREL) tablet 25 mg, Nightly  melatonin disintegrating tablet 5 mg, Nightly PRN        Review of Systems:   14 point ROS obtained but were negative except mentioned in HPI      Physical exam:     Vitals:  /66   Pulse 56   Temp 97.8 °F (36.6 °C) (Oral)   Resp 18   Ht 5' 11\" (1.803 m)   Wt 222 lb 3.6 oz (100.8 kg)   SpO2 94%   BMI 30.99 kg/m²   Constitutional:  OAA X3 NAD  Skin: no rash, turgor wnl  Heent:  eomi, mmm  Neck: no bruits or jvd noted  Cardiovascular:  S1, S2 without m/r/g  Respiratory: CTA B without w/r/r  Abdomen:  +bs, soft, nt, nd  Ext: + lower extremity edema  Psychiatric: mood and affect appropriate  Musculoskeletal:  Rom, muscular strength intact    Data:   Labs:  CBC:   Recent Labs     07/04/22 0732 07/05/22 0923 07/06/22 0730   WBC 5.3 5.7 5.9   HGB 10.6* 11.4* 10.2*    163 143     BMP:    Recent Labs     07/04/22 2152 07/05/22 0923 07/06/22  0730   * 136 135*   K 5.9* 4.5 4.8   CL 99 97* 96*   CO2 26 30 31   BUN 66* 66* 69*   CREATININE 1.0 1.0 1.1   GLUCOSE 119* 146* 91     Ca/Mg/Phos:   Recent Labs     07/04/22  0731 07/04/22  0731 07/04/22  0732 07/04/22  0732 07/04/22 1757 07/04/22 1757 07/04/22 2152 07/05/22 0923 07/06/22  0730   CALCIUM 8.7   < > 8.6   < > 8.7   < > 8.7 8.8 8.4   MG  --   --  2.10  --   --   --   --  2.30 2.40   PHOS 3.8  --   --   --  4.0  --  3.9  --   --     < > = values in this interval not displayed. Hepatic: No results for input(s): AST, ALT, ALB, BILITOT, ALKPHOS in the last 72 hours. Troponin: No results for input(s): TROPONINI in the last 72 hours. BNP: No results for input(s): BNP in the last 72 hours. Lipids: No results for input(s): CHOL, TRIG, HDL, LDLCALC, LABVLDL in the last 72 hours. ABGs: No results for input(s): PHART, PO2ART, AVT2GJX in the last 72 hours.   INR: No results for input(s): INR in the last 72 hours. UA:No results for input(s): Therisa Lever, GLUCOSEU, BILIRUBINUR, Shearon Nickels, BLOODU, PHUR, PROTEINU, UROBILINOGEN, NITRU, LEUKOCYTESUR, LABMICR, URINETYPE in the last 72 hours. Urine Microscopic: No results for input(s): LABCAST, BACTERIA, COMU, HYALCAST, WBCUA, RBCUA, EPIU in the last 72 hours. Urine Culture: No results for input(s): LABURIN in the last 72 hours. Urine Chemistry:   Recent Labs     07/05/22  1033   NAUR 96             IMAGING:  XR ABDOMEN (KUB) (SINGLE AP VIEW)   Final Result      Large amount of colonic stool      XR FOOT LEFT (MIN 3 VIEWS)   Final Result      1. No acute osseous findings. 2.  Post partial 5th ray and dictation. Assessment/Plan   1. Hyperkalemia     2. HTN    3. Anemia    4. Acid- base/ Electrolyte imbalance     5.  Ac cystitis     Plan   - Abx   - Low K diet   - cont diuretics  - BNP nl   - inc protein intake   - labs in am       Recommend to dose adjust all medications  based on renal functions  Maintain SBP> 90 mmHg   Daily weights   AVOID NSAIDs  Avoid Nephrotoxins  Monitor Intake/Output  Call if significant decrease in urine output                   Thank you for allowing us to participate in care of Theo Fung MD  Feel free to contact me   [facility] [address]  Office : [phone]  Fax :[phone]

## 2022-07-06 NOTE — CARE COORDINATION
CM following. Pt is from [facility], will return at DC. [facility] nurse, Nicci Nogueira will come to [facility] on 7/8 at 1:00pm to assist with refilling pt's intrathecal pain pump if pt is still in hospital.  CM will continue to follow for DC needs and recs.         Electronically signed by ARIC Leong, CARLIN on 7/6/2022 at 11:14 AM

## 2022-07-06 NOTE — PROGRESS NOTES
General: He is not in acute distress.     Appearance: Normal appearance. He is not diaphoretic. HENT:      Head: Normocephalic and atraumatic.      Right Ear: External ear normal.      Left Ear: External ear normal.      Nose: No rhinorrhea. Eyes:      Extraocular Movements: Extraocular movements intact.      Conjunctiva/sclera: Conjunctivae normal.   Cardiovascular:      Rate and Rhythm: Normal rate and regular rhythm.      Heart sounds: Normal heart sounds. Pulmonary:      Effort: Pulmonary effort is normal.   Abdominal:      General: Bowel sounds are normal.      Tenderness: There is abdominal tenderness. There is no guarding or rebound. Genitourinary:     Comments: Scrotal swelling and rash   Musculoskeletal:      Comments: BL LE wrapped   Skin:     General: Skin is warm and dry. Neurological:      Mental Status: He is alert. Mental status is at baseline. LABS:    CBC:   Recent Labs     07/04/22 0732 07/05/22 0923 07/06/22  0730   WBC 5.3 5.7 5.9   HGB 10.6* 11.4* 10.2*   HCT 32.4* 35.5* 31.4*    163 143   MCV 94.8 95.5 94.8     Renal:    Recent Labs     07/04/22  0731 07/04/22  0731 07/04/22  0732 07/04/22  0732 07/04/22 1757 07/04/22 2152 07/05/22  0923      < > 137   < > 134* 135* 136   K 5.7*  --  5.7*   < > 6.2* 5.9* 4.5      < > 103   < > 99 99 97*   CO2 25   < > 28   < > 21 26 30   BUN 63*   < > 60*   < > 65* 66* 66*   CREATININE 1.0   < > 0.9   < > 1.0 1.0 1.0   GLUCOSE 88   < > 86   < > 125* 119* 146*   CALCIUM 8.7   < > 8.6   < > 8.7 8.7 8.8   MG  --   --  2.10  --   --   --  2.30   PHOS 3.8  --   --   --  4.0 3.9  --    ANIONGAP 11   < > 6   < > 14 10 9    < > = values in this interval not displayed. Hepatic:   Recent Labs     07/04/22  0731 07/04/22 1757 07/04/22 2152   [facility] 3.4 3.1* 3.4     Troponin: No results for input(s): TROPONINI in the last 72 hours. BNP: No results for input(s): BNP in the last 72 hours.   Lipids: No results for input(s): CHOL, HDL in the last 72 hours. Invalid input(s): LDLCALCU, TRIGLYCERIDE  ABGs:  No results for input(s): PHART, OIK2WVJ, PO2ART, TAR8JDW, BEART, THGBART, V0EVJFHJ, YNH8YKC in the last 72 hours. INR: No results for input(s): INR in the last 72 hours. Lactate: No results for input(s): LACTATE in the last 72 hours. Cultures:  -----------------------------------------------------------------  RAD:   XR ABDOMEN (KUB) (SINGLE AP VIEW)   Final Result      Large amount of colonic stool      XR FOOT LEFT (MIN 3 VIEWS)   Final Result      1. No acute osseous findings. 2.  Post partial 5th ray and dictation. Assessment/Plan:     Dysuria 2/2 acute complicated cystitis  Hx of MDRO UTI  Chronic indwelling schneider 2/2 neurogenic bladder  Scrotal swelling  H/O Proteus MDRO UTI 4/21, had multiple UTI with E. Fecalis, proteus, pseudomonas, candida in the past   Chronic indwelling catheter since 2019 for neurogenic bladder  UA with 10-20 WBC, positive nitrite, moderate LE and 4+ bacteria     -IV ceftriaxone. cefepime, vanc discontinued.  -Lasix 80 mg IV bid  -Elevate scrotum     Hyperkalemia - normalized  K 5.2 > 5.7 > 5.9 > 4.5 > 4.8  -continue lasix      Intertrigo likely 2/2 fungal rash  - Cont diflucan  - Miconazole powder to keep area dry     Abdominal pain and bloating 2/2 constipation  KUB with considerable stool burden.   - scheduled glycolax BID     Hypoalbuminemia  Prealbumin 14.1  -protein supplementation      Lower extremity wounds   Left MT osteomyelitis s/p L partial ray resection  MRSA +ve wound cx on 05/25/22   B/L LE wrapped in dressings   Was recently admitted 5/25/22 and treated with abx   -Podiatry seeing  -wound care  -Does not look infected      Normocytic anemia  Likely 2/2 iron deficiency  Low RBCs, high RDW,   Iron 57, ferritin 32.9 1/18/22     Pt is bed bound - PT, OT eval ordered.     Chronic medical problems   CAD s/p CABG in 2013   -continue home aspirin 81 mg daily   -continue lipitor 80 mg nightly      Hypothyroidism   -continue home synthyroid 75 mcg      BPH  -continue home flomax 0.8 mg PO daily   -chronic indwelling foleys catheter      Mood disorders   -continue home trazodone 25 mg PO nightly       Code Status:Full code  FEN: ADULT DIET;  Regular; Low Fat/Low Chol/High Fiber/2 gm Na  PPX: lovenox   DISPO: Yecenia Scott MD, PGY-1  07/06/22  8:17 AM    This patient has been staffed and discussed with Andry Schafer MD.

## 2022-07-06 NOTE — PLAN OF CARE
Problem: Chronic Conditions and Co-morbidities  Goal: Patient's chronic conditions and co-morbidity symptoms are monitored and maintained or improved  7/6/2022 1527 by Kailash Stone RN  Outcome: Completed  Flowsheets (Taken 7/6/2022 0813)  Care Plan - Patient's Chronic Conditions and Co-Morbidity Symptoms are Monitored and Maintained or Improved:   Monitor and assess patient's chronic conditions and comorbid symptoms for stability, deterioration, or improvement   Collaborate with multidisciplinary team to address chronic and comorbid conditions and prevent exacerbation or deterioration   Update acute care plan with appropriate goals if chronic or comorbid symptoms are exacerbated and prevent overall improvement and discharge  7/6/2022 0541 by Gean Schlatter, RN  Outcome: Progressing     Problem: Discharge Planning  Goal: Discharge to home or other facility with appropriate resources  Outcome: Completed  Flowsheets (Taken 7/6/2022 0813)  Discharge to home or other facility with appropriate resources:   Identify barriers to discharge with patient and caregiver   Arrange for needed discharge resources and transportation as appropriate   Identify discharge learning needs (meds, wound care, etc)   Refer to discharge planning if patient needs post-hospital services based on physician order or complex needs related to functional status, cognitive ability or social support system     Problem: Pain  Goal: Verbalizes/displays adequate comfort level or baseline comfort level  7/6/2022 1527 by Kailash Stone RN  Outcome: Completed  Flowsheets (Taken 7/6/2022 0813)  Verbalizes/displays adequate comfort level or baseline comfort level:   Encourage patient to monitor pain and request assistance   Assess pain using appropriate pain scale   Administer analgesics based on type and severity of pain and evaluate response   Implement non-pharmacological measures as appropriate and evaluate response   Consider cultural and social influences on pain and pain management   Notify Licensed Independent Practitioner if interventions unsuccessful or patient reports new pain  7/6/2022 0541 by Rebecca Mcgraw RN  Outcome: Progressing     Problem: Skin/Tissue Integrity  Goal: Absence of new skin breakdown  Description: 1. Monitor for areas of redness and/or skin breakdown  2. Assess vascular access sites hourly  3. Every 4-6 hours minimum:  Change oxygen saturation probe site  4. Every 4-6 hours:  If on nasal continuous positive airway pressure, respiratory therapy assess nares and determine need for appliance change or resting period.   Outcome: Completed     Problem: Safety - Adult  Goal: Free from fall injury  Outcome: Completed  Flowsheets (Taken 7/6/2022 0814)  Free From Fall Injury:   Instruct family/caregiver on patient safety   Based on caregiver fall risk screen, instruct family/caregiver to ask for assistance with transferring infant if caregiver noted to have fall risk factors     Problem: ABCDS Injury Assessment  Goal: Absence of physical injury  Outcome: Completed  Flowsheets (Taken 7/6/2022 0814)  Absence of Physical Injury: Implement safety measures based on patient assessment

## 2022-07-06 NOTE — PROGRESS NOTES
Podiatric Surgery Daily Progress Note  Linda Block      Subjective :   Patient seen and examined this am at the bedside. Patient denies N/V/F/C/SOB. Patient denies calf pain, thigh pain, chest pain. Review of Systems: A 12 point review of symptoms is unremarkable with the exception of the chief complaint. Patient specifically denies nausea, fever, vomiting, chills, shortness of breath, chest pain, abdominal pain,or constipation     Objective     BP (!) 102/54   Pulse 56   Temp 97.8 °F (36.6 °C) (Axillary)   Resp 18   Ht 5' 11\" (1.803 m)   Wt 222 lb 3.6 oz (100.8 kg)   SpO2 94%   BMI 30.99 kg/m²      I/O:    Intake/Output Summary (Last 24 hours) at 7/6/2022 0726  Last data filed at 7/6/2022 0612  Gross per 24 hour   Intake 120 ml   Output 3025 ml   Net -2905 ml              Wt Readings from Last 3 Encounters:   07/06/22 222 lb 3.6 oz (100.8 kg)   06/02/22 210 lb 12.2 oz (95.6 kg)   04/22/22 232 lb 12.8 oz (105.6 kg)       LABS:    Recent Labs     07/04/22  0732 07/05/22  0923   WBC 5.3 5.7   HGB 10.6* 11.4*   HCT 32.4* 35.5*    163        Recent Labs     07/04/22  0732 07/04/22  2152 07/05/22  0923   NA   < > 135* 136   K   < > 5.9* 4.5   CL   < > 99 97*   CO2   < > 26 30   PHOS  --  3.9  --    BUN   < > 66* 66*   CREATININE   < > 1.0 1.0    < > = values in this interval not displayed. No results for input(s): PROT, INR, APTT in the last 72 hours. LOWER EXTREMITY EXAMINATION    Dressing to bilateral LE intact. No strikethrough noted to the external dressing. No drainage noted to the internal layers of the dressing. VASCULAR: DP and PT pulses non-palpable B/L but upon doppler examination were found to be biphasic B/L. CFT is brisk to the digits of the foot b/l. Skin temperature is warm to warm from proximal to distal with no focal increase in temperature noted. No erythema noted. No edema noted to B/L LE 2/2 to compressive therapy.  No pain with calf compression b/l.     NEUROLOGIC:

## 2022-07-07 LAB
ANION GAP SERPL CALCULATED.3IONS-SCNC: 11 MMOL/L (ref 3–16)
BASOPHILS ABSOLUTE: 0.1 K/UL (ref 0–0.2)
BASOPHILS RELATIVE PERCENT: 1.4 %
BUN BLDV-MCNC: 73 MG/DL (ref 7–20)
CALCIUM SERPL-MCNC: 8.7 MG/DL (ref 8.3–10.6)
CHLORIDE BLD-SCNC: 95 MMOL/L (ref 99–110)
CO2: 32 MMOL/L (ref 21–32)
CREAT SERPL-MCNC: 1.2 MG/DL (ref 0.8–1.3)
EOSINOPHILS ABSOLUTE: 0.9 K/UL (ref 0–0.6)
EOSINOPHILS RELATIVE PERCENT: 13.4 %
GFR AFRICAN AMERICAN: >60
GFR NON-AFRICAN AMERICAN: 58
GLUCOSE BLD-MCNC: 102 MG/DL (ref 70–99)
GLUCOSE BLD-MCNC: 131 MG/DL (ref 70–99)
GLUCOSE BLD-MCNC: 134 MG/DL (ref 70–99)
GLUCOSE BLD-MCNC: 138 MG/DL (ref 70–99)
GLUCOSE BLD-MCNC: 89 MG/DL (ref 70–99)
HCT VFR BLD CALC: 33.4 % (ref 40.5–52.5)
HEMOGLOBIN: 10.9 G/DL (ref 13.5–17.5)
LYMPHOCYTES ABSOLUTE: 2.1 K/UL (ref 1–5.1)
LYMPHOCYTES RELATIVE PERCENT: 29.1 %
MAGNESIUM: 2.7 MG/DL (ref 1.8–2.4)
MCH RBC QN AUTO: 31.4 PG (ref 26–34)
MCHC RBC AUTO-ENTMCNC: 32.8 G/DL (ref 31–36)
MCV RBC AUTO: 96 FL (ref 80–100)
MONOCYTES ABSOLUTE: 0.7 K/UL (ref 0–1.3)
MONOCYTES RELATIVE PERCENT: 10.3 %
NEUTROPHILS ABSOLUTE: 3.2 K/UL (ref 1.7–7.7)
NEUTROPHILS RELATIVE PERCENT: 45.8 %
PDW BLD-RTO: 18.8 % (ref 12.4–15.4)
PERFORMED ON: ABNORMAL
PLATELET # BLD: 135 K/UL (ref 135–450)
PLATELET SLIDE REVIEW: ADEQUATE
PMV BLD AUTO: 9.7 FL (ref 5–10.5)
POTASSIUM REFLEX MAGNESIUM: 5.3 MMOL/L (ref 3.5–5.1)
RBC # BLD: 3.48 M/UL (ref 4.2–5.9)
SODIUM BLD-SCNC: 138 MMOL/L (ref 136–145)
WBC # BLD: 7 K/UL (ref 4–11)

## 2022-07-07 PROCEDURE — 2580000003 HC RX 258

## 2022-07-07 PROCEDURE — 6360000002 HC RX W HCPCS

## 2022-07-07 PROCEDURE — 85025 COMPLETE CBC W/AUTO DIFF WBC: CPT

## 2022-07-07 PROCEDURE — 6360000002 HC RX W HCPCS: Performed by: STUDENT IN AN ORGANIZED HEALTH CARE EDUCATION/TRAINING PROGRAM

## 2022-07-07 PROCEDURE — 80048 BASIC METABOLIC PNL TOTAL CA: CPT

## 2022-07-07 PROCEDURE — 83735 ASSAY OF MAGNESIUM: CPT

## 2022-07-07 PROCEDURE — 51702 INSERT TEMP BLADDER CATH: CPT

## 2022-07-07 PROCEDURE — 6370000000 HC RX 637 (ALT 250 FOR IP)

## 2022-07-07 PROCEDURE — 36415 COLL VENOUS BLD VENIPUNCTURE: CPT

## 2022-07-07 PROCEDURE — 1200000000 HC SEMI PRIVATE

## 2022-07-07 PROCEDURE — 6370000000 HC RX 637 (ALT 250 FOR IP): Performed by: STUDENT IN AN ORGANIZED HEALTH CARE EDUCATION/TRAINING PROGRAM

## 2022-07-07 PROCEDURE — 99233 SBSQ HOSP IP/OBS HIGH 50: CPT | Performed by: INTERNAL MEDICINE

## 2022-07-07 RX ORDER — FLUCONAZOLE 150 MG/1
150 TABLET ORAL WEEKLY
Qty: 3 TABLET | Refills: 0 | DISCHARGE
Start: 2022-07-09 | End: 2022-07-24

## 2022-07-07 RX ORDER — FUROSEMIDE 40 MG/1
40 TABLET ORAL DAILY
Qty: 60 TABLET | Refills: 3 | DISCHARGE
Start: 2022-07-08

## 2022-07-07 RX ORDER — FUROSEMIDE 40 MG/1
40 TABLET ORAL DAILY
Status: DISCONTINUED | OUTPATIENT
Start: 2022-07-07 | End: 2022-07-08 | Stop reason: HOSPADM

## 2022-07-07 RX ADMIN — ENOXAPARIN SODIUM 30 MG: 100 INJECTION SUBCUTANEOUS at 20:36

## 2022-07-07 RX ADMIN — ASPIRIN 81 MG 81 MG: 81 TABLET ORAL at 09:35

## 2022-07-07 RX ADMIN — TRAZODONE HYDROCHLORIDE 25 MG: 50 TABLET ORAL at 20:28

## 2022-07-07 RX ADMIN — GABAPENTIN 600 MG: 600 TABLET, FILM COATED ORAL at 09:36

## 2022-07-07 RX ADMIN — ACETAMINOPHEN 325MG 650 MG: 325 TABLET ORAL at 23:13

## 2022-07-07 RX ADMIN — PANTOPRAZOLE SODIUM 40 MG: 40 TABLET, DELAYED RELEASE ORAL at 06:14

## 2022-07-07 RX ADMIN — Medication 5 MG: at 23:14

## 2022-07-07 RX ADMIN — ENOXAPARIN SODIUM 30 MG: 100 INJECTION SUBCUTANEOUS at 09:35

## 2022-07-07 RX ADMIN — SODIUM CHLORIDE, PRESERVATIVE FREE 10 ML: 5 INJECTION INTRAVENOUS at 20:35

## 2022-07-07 RX ADMIN — MICONAZOLE NITRATE: 20 POWDER TOPICAL at 20:36

## 2022-07-07 RX ADMIN — TAMSULOSIN HYDROCHLORIDE 0.8 MG: 0.4 CAPSULE ORAL at 09:36

## 2022-07-07 RX ADMIN — CEFTRIAXONE 1000 MG: 1 INJECTION, POWDER, FOR SOLUTION INTRAMUSCULAR; INTRAVENOUS at 16:20

## 2022-07-07 RX ADMIN — GABAPENTIN 600 MG: 600 TABLET, FILM COATED ORAL at 20:34

## 2022-07-07 RX ADMIN — POLYETHYLENE GLYCOL 3350 17 G: 17 POWDER, FOR SOLUTION ORAL at 20:27

## 2022-07-07 RX ADMIN — ATORVASTATIN CALCIUM 80 MG: 40 TABLET, FILM COATED ORAL at 20:28

## 2022-07-07 RX ADMIN — LEVOTHYROXINE SODIUM 75 MCG: 0.07 TABLET ORAL at 06:14

## 2022-07-07 RX ADMIN — FUROSEMIDE 80 MG: 10 INJECTION, SOLUTION INTRAMUSCULAR; INTRAVENOUS at 09:36

## 2022-07-07 ASSESSMENT — PAIN SCALES - GENERAL
PAINLEVEL_OUTOF10: 6
PAINLEVEL_OUTOF10: 6
PAINLEVEL_OUTOF10: 5

## 2022-07-07 ASSESSMENT — PAIN - FUNCTIONAL ASSESSMENT
PAIN_FUNCTIONAL_ASSESSMENT: ACTIVITIES ARE NOT PREVENTED
PAIN_FUNCTIONAL_ASSESSMENT: PREVENTS OR INTERFERES SOME ACTIVE ACTIVITIES AND ADLS

## 2022-07-07 ASSESSMENT — PAIN DESCRIPTION - ORIENTATION: ORIENTATION: LEFT;MID

## 2022-07-07 ASSESSMENT — PAIN DESCRIPTION - DESCRIPTORS: DESCRIPTORS: ACHING

## 2022-07-07 ASSESSMENT — PAIN DESCRIPTION - LOCATION: LOCATION: LEG

## 2022-07-07 NOTE — DISCHARGE SUMMARY
cooperative  Head: Normocephalic, without obvious abnormality, atraumatic  Eyes: conjunctivae/corneas clear. PERRL, EOM's intact. Fundi benign. Ears: normal TM's and external ear canals both ears  Nose: Nares normal. Septum midline. Mucosa normal. No drainage or sinus tenderness. Throat: lips, mucosa, and tongue normal; teeth and gums normal  Neck: no adenopathy, no carotid bruit, no JVD, supple, symmetrical, trachea midline and thyroid not enlarged, symmetric, no tenderness/mass/nodules  Lungs: clear to auscultation bilaterally  Heart: regular rate and rhythm, S1, S2 normal, no murmur, click, rub or gallop  Abdomen: distended, tender to palpation; bowel sounds normal; no masses,  no organomegaly  Extremities: BL LE wrapped  Neurologic: Grossly normal    Consults:  IP CONSULT TO HOSPITALIST  IP CONSULT TO PODIATRY  PHARMACY TO DOSE VANCOMYCIN  IP CONSULT TO NEPHROLOGY  IP CONSULT TO ANESTHESIOLOGY    Disposition: assisted living  Discharged Condition: Stable  Follow Up: Pt will be in an assisted living facility    DISCHARGE MEDICATION:     Medication List      START taking these medications    fluconazole 150 MG tablet  Commonly known as: DIFLUCAN  Take 1 tablet by mouth once a week for 3 doses  Start taking on: July 9, 2022     furosemide 40 MG tablet  Commonly known as: LASIX  Take 1 tablet by mouth daily     * miconazole 2 % cream  Commonly known as: MICOTIN  Apply topically 2 times daily. * miconazole 2 % powder  Commonly known as: MICOTIN  Apply topically 2 times daily. * This list has 2 medication(s) that are the same as other medications prescribed for you. Read the directions carefully, and ask your doctor or other care provider to review them with you.             CONTINUE taking these medications    albuterol sulfate  (90 Base) MCG/ACT inhaler  Commonly known as: Proventil HFA  Inhale 2 puffs into the lungs every 6 hours as needed for Wheezing or Shortness of Breath     aspirin 81 MG chewable tablet  Take 1 tablet by mouth daily     atorvastatin 80 MG tablet  Commonly known as: LIPITOR     dicyclomine 20 MG tablet  Commonly known as: BENTYL     docusate sodium 100 MG capsule  Commonly known as: COLACE     ferrous sulfate 325 (65 Fe) MG tablet  Commonly known as: IRON 325  Take 1 tablet by mouth 2 times daily     gabapentin 600 MG tablet  Commonly known as: NEURONTIN     guaiFENesin 600 MG extended release tablet  Commonly known as: MUCINEX  Take 1 tablet by mouth 2 times daily as needed for Congestion     * RONEY PO     * Ensure High Protein Liqd     levothyroxine 75 MCG tablet  Commonly known as: SYNTHROID  Take 1 tablet by mouth Daily     loperamide 2 MG capsule  Commonly known as: IMODIUM     Melatonin 5 MG Caps     MEPILEX EX     pantoprazole 40 MG tablet  Commonly known as: PROTONIX  Take 1 tablet by mouth every morning (before breakfast)     tamsulosin 0.4 MG capsule  Commonly known as: FLOMAX     traZODone 50 MG tablet  Commonly known as: DESYREL     Venelex Oint ointment     vitamin B-12 1000 MCG tablet  Commonly known as: CYANOCOBALAMIN         * This list has 2 medication(s) that are the same as other medications prescribed for you. Read the directions carefully, and ask your doctor or other care provider to review them with you.                Where to Get Your Medications      Information about where to get these medications is not yet available    Ask your nurse or doctor about these medications  · fluconazole 150 MG tablet  · furosemide 40 MG tablet  · miconazole 2 % cream  · miconazole 2 % powder       Activity: Pt is bed bound  Diet: regular diet  Wound Care: as directed    Time Spent on discharge is more than 30 minutes    Signed:  Nestor Almodovar MD, PGY1  7/8/2022

## 2022-07-07 NOTE — PROGRESS NOTES
Nephrology  Note                                                                                                                                                                                                                                                                                                                                                               Office : [phone]     Fax :[phone]              Patient's Name: Stormy Perez  7/7/2022    Reason for Consult:  Hyperkalemia   Requesting Physician:  Ena Webb      07/07/22     Hemolyzed sample today   K should be in good range   No N/V/D   Good UO         Past Medical History:   Diagnosis Date    BPH (benign prostatic hyperplasia)     C. difficile colitis 04/11/2022    Carotid stenosis 12/2013    JESU 21-97% stenosis; LICA 23-67% stenosis    Cellulitis 12/2013    LLE    CHF (congestive heart failure) (HCC)     Chronic back pain     Diverticular disease     Encounter for imaging to screen for metal prior to MRI 05/26/2022    [employer]: Synchromed II pump for baclofen -lfe    GERD (gastroesophageal reflux disease)     History of atrial fibrillation     Hypercholesteremia     Hypertension     Lower GI bleed     MDRO (multiple drug resistant organisms) resistance 10/26/2019    urine    Neuromuscular disorder (HCC)     spasticity    Renal insufficiency     Septic arthritis of interphalangeal joint of toe of right foot ([address] 5/27/2022    Vitamin B12 deficiency        Past Surgical History:   Procedure Laterality Date    BACK SURGERY  2006    lower lumbar    CORONARY ARTERY BYPASS GRAFT  12/13/2013    CABG x 5 (Dr Charly Howell), svg to diag, om1 and 3, distal rca, kelly to lad.      CYSTOSCOPY N/A 1/10/2019    CYSTOSCOPY performed by Mihaela Tran MD at [address] Left 5/29/2022    LEFT FOOT PARTIAL FIFTH RAY RESECTION WITH EXCISIONAL DEBRIDEMENT OF MUSCLE AND FASCIA, WITH APPLICATION OF GRAFT performed by Britni Gomez DPM at [address] [facility] HIP SURGERY Right 5/1/2015    ORIF    LEG SURGERY Right 11/2/2021    RIGHT LOWER EXTREMITY ADVANCEMENT FLAP AND SPLIT THICKNESS SKIN GRAFT PLACEMENT; (WOUND- 10 CM X 5.5 CM; CLOSURE- 6 CM X 5.5 CM; SKIN GRAFT- 7.2 CM X 5 CM) performed by Dianne Veloz MD at [facility] N/A 6/11/2020    [address] [facility] performed by Miriam Curry MD at [address] [place] [address],[facility] N/A 5/4/2020    EGD BIOPSY performed by Miriam Curry MD at [facility] ENDOSCOPY       Family History   Problem Relation Age of Onset    Heart Disease Father         reports that he quit smoking about 52 years ago. He has never used smokeless tobacco. He reports that he does not drink alcohol and does not use drugs.     Allergies:  Bactrim [sulfamethoxazole-trimethoprim]    Current Medications:    furosemide (LASIX) tablet 40 mg, Daily  baclofen intrathecal pump - PLACEDHOLDER, Continuous  miconazole (MICOTIN) 2 % cream, PRN  ammonium lactate (LAC-HYDRIN) 12 % lotion, PRN  miconazole (MICOTIN) 2 % powder, BID  SMOG Enema, Once  polyethylene glycol (GLYCOLAX) packet 17 g, BID  perflutren lipid microspheres (DEFINITY) injection 1.65 mg, ONCE PRN  fluconazole (DIFLUCAN) tablet 150 mg, Weekly  albuterol (PROVENTIL) nebulizer solution 2.5 mg, Q4H PRN  aspirin chewable tablet 81 mg, Daily  atorvastatin (LIPITOR) tablet 80 mg, Nightly  gabapentin (NEURONTIN) tablet 600 mg, BID  levothyroxine (SYNTHROID) tablet 75 mcg, QAM AC  pantoprazole (PROTONIX) tablet 40 mg, QAM AC  tamsulosin (FLOMAX) capsule 0.8 mg, Daily  sodium chloride flush 0.9 % injection 5-40 mL, 2 times per day  sodium chloride flush 0.9 % injection 5-40 mL, PRN  0.9 % sodium chloride infusion, PRN  enoxaparin Sodium (LOVENOX) injection 30 mg, BID  ondansetron (ZOFRAN-ODT) disintegrating tablet 4 mg, Q8H PRN   Or  ondansetron (ZOFRAN) injection 4 mg, Q6H PRN  acetaminophen (TYLENOL) tablet 650 mg, Q6H PRN   Or  acetaminophen (TYLENOL) suppository 650 mg, Q6H PRN  traZODone (DESYREL) tablet 25 mg, Nightly  melatonin disintegrating tablet 5 mg, Nightly PRN        Review of Systems:   14 point ROS obtained but were negative except mentioned in HPI      Physical exam:     Vitals:  BP (!) 100/58   Pulse 67   Temp 97.5 °F (36.4 °C) (Oral)   Resp 16   Ht 5' 11\" (1.803 m)   Wt 218 lb 7.6 oz (99.1 kg)   SpO2 94%   BMI 30.47 kg/m²   Constitutional:  OAA X3 NAD  Skin: no rash, turgor wnl  Heent:  eomi, mmm  Neck: no bruits or jvd noted  Cardiovascular:  S1, S2 without m/r/g  Respiratory: CTA B without w/r/r  Abdomen:  +bs, soft, nt, nd  Ext: + lower extremity edema  Psychiatric: mood and affect appropriate  Musculoskeletal:  Rom, muscular strength intact    Data:   Labs:  CBC:   Recent Labs     07/05/22 0923 07/06/22 0730 07/07/22 0744   WBC 5.7 5.9 7.0   HGB 11.4* 10.2* 10.9*    143 135     BMP:    Recent Labs     07/05/22 0923 07/06/22 0730 07/07/22  0744    135* 138   K 4.5 4.8 5.3*   CL 97* 96* 95*   CO2 30 31 32   BUN 66* 69* 73*   CREATININE 1.0 1.1 1.2   GLUCOSE 146* 91 89     Ca/Mg/Phos:   Recent Labs     07/04/22  1757 07/04/22 1757 07/04/22 2152 07/04/22 2152 07/05/22 0923 07/06/22 0730 07/07/22  0744   CALCIUM 8.7   < > 8.7   < > 8.8 8.4 8.7   MG  --   --   --   --  2.30 2.40 2.70*   PHOS 4.0  --  3.9  --   --   --   --     < > = values in this interval not displayed. Hepatic: No results for input(s): AST, ALT, ALB, BILITOT, ALKPHOS in the last 72 hours. Troponin: No results for input(s): TROPONINI in the last 72 hours. BNP: No results for input(s): BNP in the last 72 hours. Lipids: No results for input(s): CHOL, TRIG, HDL, LDLCALC, LABVLDL in the last 72 hours. ABGs: No results for input(s): PHART, PO2ART, QOV5JYE in the last 72 hours. INR: No results for input(s): INR in the last 72 hours.   UA:No results for input(s): Elnita Chelsea, GLUCOSEU, Hampton Semen, UROBILINOGEN, NITRU, LEUKOCYTESUR, Wong Yarsanism in the last 72 hours. Urine Microscopic: No results for input(s): LABCAST, BACTERIA, COMU, HYALCAST, WBCUA, RBCUA, EPIU in the last 72 hours. Urine Culture: No results for input(s): LABURIN in the last 72 hours. Urine Chemistry:   Recent Labs     07/05/22  1033 07/06/22  1608   LABCREA  --  41.6   PROTEINUR  --  13.00*   NAUR 96  --              IMAGING:  XR ABDOMEN (KUB) (SINGLE AP VIEW)   Final Result      Large amount of colonic stool      XR FOOT LEFT (MIN 3 VIEWS)   Final Result      1. No acute osseous findings. 2.  Post partial 5th ray and dictation. VL Extremity Venous Bilateral    (Results Pending)       Assessment/Plan   1. Hyperkalemia     2. HTN    3. Anemia    4. Acid- base/ Electrolyte imbalance     5.  Ac cystitis     Plan   - Abx   - Low K diet   - cont diuretics  - BNP nl   - inc protein intake   - labs in am       Recommend to dose adjust all medications  based on renal functions  Maintain SBP> 90 mmHg   Daily weights   AVOID NSAIDs  Avoid Nephrotoxins  Monitor Intake/Output  Call if significant decrease in urine output                   Thank you for allowing us to participate in care of Smita Chan MD  Feel free to contact me   [facility] [address]  Office : [phone]  Fax :[phone]

## 2022-07-07 NOTE — CARE COORDINATION
Perfectserved Dr. Donald Webb to let us know her plan since discharge order is put in. Patient to have vasc doppler today and no time slot given from vasc yet. Asked if these are to be outpatient or inpatient and pain pump nurse needs to know where patient will be to refill pump as well. CM will continue to follow patient until discharge. Electronically signed by Maria Isabel Simpson RN on 7/7/2022 at 2:15 PM     Addendum:   [facility] - Phone: [phone] Fax: [address]- to make sure we have everything in place for them to accept him back to the facility including transport. We are still awaiting comment from doctor about having dopplers before discharge. CM will continue to follow patient until discharge. Electronically signed by Maria Isabel Simpson RN on 7/7/2022 at 2:31 PM     Resident, Dr. Kerry Felder, spoke to this CM and they are suspending discharge until  Tomorrow and awaiting vascular inpatient dopplers. They wish to have the pain pump refilled tomorrow as well here at [facility] This CM faxed updates and alert to [address] attn: Dustin Vasquez -nurse that will fill pain pump tomorrow. She is instructed to call CM when she is en route to [facility]. CM will continue to follow patient until discharge.   Electronically signed by Maria Isabel Simpson RN on 7/7/2022 at 2:42 PM

## 2022-07-07 NOTE — PROGRESS NOTES
Progress Note    Admit Date: 6/28/2022  Day: 8  Diet: ADULT DIET; Regular; Low Fat/Low Chol/High Fiber/2 gm Na; Low Potassium (Less than 3000 mg/day)  ADULT ORAL NUTRITION SUPPLEMENT; Dinner, Breakfast; Renal Oral Supplement  ADULT ORAL NUTRITION SUPPLEMENT; Lunch, Dinner; Renal Oral Supplement    CC: L abdominal pain    Interval history: No acute events overnight. [de-identified] yo male still complains of abdominal pain, wants his trazodone dose increased to \"sleep better. \" Pt reports no other symptoms. Medications:     Scheduled Meds:   furosemide  80 mg IntraVENous BID    miconazole   Topical BID    SMOG Enema  330 mL Rectal Once    polyethylene glycol  17 g Oral BID    fluconazole  150 mg Oral Weekly    cefTRIAXone (ROCEPHIN) IV  1,000 mg IntraVENous Q24H    aspirin  81 mg Oral Daily    atorvastatin  80 mg Oral Nightly    gabapentin  600 mg Oral BID    levothyroxine  75 mcg Oral QAM AC    pantoprazole  40 mg Oral QAM AC    tamsulosin  0.8 mg Oral Daily    sodium chloride flush  5-40 mL IntraVENous 2 times per day    enoxaparin  30 mg SubCUTAneous BID    traZODone  25 mg Oral Nightly     Continuous Infusions:   baclofen      sodium chloride 25 mL (06/30/22 1143)     PRN Meds:miconazole, ammonium lactate, perflutren lipid microspheres, albuterol, sodium chloride flush, sodium chloride, ondansetron **OR** ondansetron, acetaminophen **OR** acetaminophen, melatonin    Objective:   Vitals:   T-max:  Patient Vitals for the past 8 hrs:   BP Temp Temp src Pulse Resp SpO2 Weight   07/07/22 0845 (!) 100/58 97.5 °F (36.4 °C) Oral 67 16 -- --   07/07/22 0408 125/72 97.8 °F (36.6 °C) Oral 52 15 94 % 218 lb 7.6 oz (99.1 kg)       Intake/Output Summary (Last 24 hours) at 7/7/2022 1024  Last data filed at 7/7/2022 0408  Gross per 24 hour   Intake 150 ml   Output 3650 ml   Net -3500 ml       Review of Systems negative    Physical Exam  Constitutional:       General: He is not in acute distress.      Appearance: He is not diaphoretic. HENT:      Head: Normocephalic and atraumatic. Right Ear: External ear normal.      Left Ear: External ear normal.   Eyes:      General:         Right eye: No discharge. Left eye: No discharge. Extraocular Movements: Extraocular movements intact. Conjunctiva/sclera: Conjunctivae normal.   Cardiovascular:      Rate and Rhythm: Normal rate and regular rhythm. Pulmonary:      Effort: Pulmonary effort is normal. No respiratory distress. Abdominal:      General: There is distension. Tenderness: There is abdominal tenderness. Musculoskeletal:         General: Swelling (abdominal, scrotal, BL thighs ) present. Skin:     General: Skin is warm and dry. Neurological:      Mental Status: He is alert and oriented to person, place, and time. Mental status is at baseline. LABS:    CBC:   Recent Labs     07/05/22 0923 07/06/22  0730   WBC 5.7 5.9   HGB 11.4* 10.2*   HCT 35.5* 31.4*    143   MCV 95.5 94.8     Renal:    Recent Labs     07/04/22 1757 07/04/22 1757 07/04/22 2152 07/04/22 2152 07/05/22 0923 07/06/22  0730 07/07/22  0744   *   < > 135*   < > 136 135* 138   K 6.2*   < > 5.9*  --  4.5 4.8 5.3*   CL 99   < > 99   < > 97* 96* 95*   CO2 21   < > 26   < > 30 31 32   BUN 65*   < > 66*   < > 66* 69* 73*   CREATININE 1.0   < > 1.0   < > 1.0 1.1 1.2   GLUCOSE 125*   < > 119*   < > 146* 91 89   CALCIUM 8.7   < > 8.7   < > 8.8 8.4 8.7   MG  --   --   --   --  2.30 2.40 2.70*   PHOS 4.0  --  3.9  --   --   --   --    ANIONGAP 14   < > 10   < > 9 8 11    < > = values in this interval not displayed. Hepatic:   Recent Labs     07/04/22 1757 07/04/22 2152   LABALBU 3.1* 3.4     Troponin: No results for input(s): TROPONINI in the last 72 hours. BNP: No results for input(s): BNP in the last 72 hours. Lipids: No results for input(s): CHOL, HDL in the last 72 hours.     Invalid input(s): LDLCALCU, TRIGLYCERIDE  ABGs:  No results for input(s): PHART, lipitor 80 mg nightly      Hypothyroidism   -continue home synthyroid 75 mcg      BPH  -continue home flomax 0.8 mg PO daily   -chronic indwelling foleys catheter      Mood disorders   -continue home trazodone 25 mg PO nightly       Code Status:Full code  FEN: ADULT DIET;  Regular; Low Fat/Low Chol/High Fiber/2 gm Na  PPX: lovenox   DISPO: Narciso Goldsmith MD, PGY-1  07/07/22  10:24 AM    This patient has been staffed and discussed with Mone Rajput MD.

## 2022-07-07 NOTE — PROGRESS NOTES
and epicritic sensation is intact b/l. Protective sensation is diminished at all pedal sites b/l.     DERMATOLOGIC: Diffuse dermatologic changes noted to b/l LE. Right lower extremity:           Scattered abrasions noted along the anterior and lateral aspect of the right leg. Partial-thickness ulcerations noted to the lateral aspect of the right foot at the base of the fifth metatarsal and the head of the fifth metatarsal with a hyperkeratotic periwound. No acute signs of infection noted. No drainage noted. No fluctuance, crepitus, or malodor noted. No tunneling or tracking noted. Left lower extremity:           Scattered abrasions noted to the anterior aspect of the left leg. Incision site on the lateral aspect of the left foot skin edges noted to be well coapted with graft incorporation noted to the distal aspect of the surgical incision. No fluctuance, crepitus, or malodor noted. No tunneling or tracking noted. No active drainage noted. No acute signs of infection noted. MUSCULOSKELETAL: No pain to palpation of the foot or ankle b/l. Muscle strength 5/5 for all pedal muscle groups. Ankle joint ROM is decreased in dorsiflexion with the knee extended. No obvious biomechanical abnormalities    IMAGING:  XR Foot Left   Narrative   EXAM: LEFT FOOT 3 VIEWS       INDICATION: left foot partial 5th ray resection follow up       COMPARISON: 5/29/22       FINDINGS:       Bones are severely osteopenic limiting detail. 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 No acute fracture or malalignment. No definite erosion. Mild generalized swelling. Extensive vascular calcifications.           Impression       1.  No acute osseous findings.    2.  Post partial 5th ray and dictation.          ASSESSMENT/PLAN  - Partial thickness ulcerations, lateral right 5th digit (Bueno 1)  - S/p left foot partial 5th ray resection with graft application (DOS 68/77/9044)  - Superficial abrasions, bilateral

## 2022-07-08 ENCOUNTER — APPOINTMENT (OUTPATIENT)
Dept: VASCULAR LAB | Age: 82
DRG: 699 | End: 2022-07-08
Payer: MEDICARE

## 2022-07-08 VITALS
SYSTOLIC BLOOD PRESSURE: 121 MMHG | WEIGHT: 218.48 LBS | RESPIRATION RATE: 16 BRPM | TEMPERATURE: 97.7 F | DIASTOLIC BLOOD PRESSURE: 72 MMHG | HEIGHT: 71 IN | OXYGEN SATURATION: 94 % | HEART RATE: 77 BPM | BODY MASS INDEX: 30.59 KG/M2

## 2022-07-08 LAB
ANION GAP SERPL CALCULATED.3IONS-SCNC: 8 MMOL/L (ref 3–16)
BASOPHILS ABSOLUTE: 0.1 K/UL (ref 0–0.2)
BASOPHILS RELATIVE PERCENT: 1.1 %
BUN BLDV-MCNC: 71 MG/DL (ref 7–20)
CALCIUM SERPL-MCNC: 8.8 MG/DL (ref 8.3–10.6)
CHLORIDE BLD-SCNC: 92 MMOL/L (ref 99–110)
CO2: 32 MMOL/L (ref 21–32)
CREAT SERPL-MCNC: 1 MG/DL (ref 0.8–1.3)
EOSINOPHILS ABSOLUTE: 0.9 K/UL (ref 0–0.6)
EOSINOPHILS RELATIVE PERCENT: 12.7 %
GFR AFRICAN AMERICAN: >60
GFR NON-AFRICAN AMERICAN: >60
GLUCOSE BLD-MCNC: 111 MG/DL (ref 70–99)
GLUCOSE BLD-MCNC: 97 MG/DL (ref 70–99)
HCT VFR BLD CALC: 33 % (ref 40.5–52.5)
HEMOGLOBIN: 10.8 G/DL (ref 13.5–17.5)
LYMPHOCYTES ABSOLUTE: 1.6 K/UL (ref 1–5.1)
LYMPHOCYTES RELATIVE PERCENT: 22.3 %
MAGNESIUM: 2.8 MG/DL (ref 1.8–2.4)
MCH RBC QN AUTO: 30.8 PG (ref 26–34)
MCHC RBC AUTO-ENTMCNC: 32.7 G/DL (ref 31–36)
MCV RBC AUTO: 94.2 FL (ref 80–100)
MONOCYTES ABSOLUTE: 0.6 K/UL (ref 0–1.3)
MONOCYTES RELATIVE PERCENT: 8.4 %
NEUTROPHILS ABSOLUTE: 3.9 K/UL (ref 1.7–7.7)
NEUTROPHILS RELATIVE PERCENT: 55.5 %
PDW BLD-RTO: 18 % (ref 12.4–15.4)
PERFORMED ON: ABNORMAL
PLATELET # BLD: 155 K/UL (ref 135–450)
PMV BLD AUTO: 9 FL (ref 5–10.5)
POTASSIUM REFLEX MAGNESIUM: 5.2 MMOL/L (ref 3.5–5.1)
RBC # BLD: 3.5 M/UL (ref 4.2–5.9)
SODIUM BLD-SCNC: 132 MMOL/L (ref 136–145)
WBC # BLD: 7.1 K/UL (ref 4–11)

## 2022-07-08 PROCEDURE — 2580000003 HC RX 258

## 2022-07-08 PROCEDURE — 6370000000 HC RX 637 (ALT 250 FOR IP): Performed by: STUDENT IN AN ORGANIZED HEALTH CARE EDUCATION/TRAINING PROGRAM

## 2022-07-08 PROCEDURE — 99233 SBSQ HOSP IP/OBS HIGH 50: CPT | Performed by: INTERNAL MEDICINE

## 2022-07-08 PROCEDURE — 6370000000 HC RX 637 (ALT 250 FOR IP)

## 2022-07-08 PROCEDURE — 80048 BASIC METABOLIC PNL TOTAL CA: CPT

## 2022-07-08 PROCEDURE — 51702 INSERT TEMP BLADDER CATH: CPT

## 2022-07-08 PROCEDURE — 85025 COMPLETE CBC W/AUTO DIFF WBC: CPT

## 2022-07-08 PROCEDURE — 36415 COLL VENOUS BLD VENIPUNCTURE: CPT

## 2022-07-08 PROCEDURE — 93970 EXTREMITY STUDY: CPT

## 2022-07-08 PROCEDURE — 6360000002 HC RX W HCPCS

## 2022-07-08 PROCEDURE — 83735 ASSAY OF MAGNESIUM: CPT

## 2022-07-08 RX ADMIN — LEVOTHYROXINE SODIUM 75 MCG: 0.07 TABLET ORAL at 06:10

## 2022-07-08 RX ADMIN — TAMSULOSIN HYDROCHLORIDE 0.8 MG: 0.4 CAPSULE ORAL at 11:39

## 2022-07-08 RX ADMIN — FUROSEMIDE 40 MG: 40 TABLET ORAL at 11:39

## 2022-07-08 RX ADMIN — ASPIRIN 81 MG 81 MG: 81 TABLET ORAL at 11:38

## 2022-07-08 RX ADMIN — GABAPENTIN 600 MG: 600 TABLET, FILM COATED ORAL at 11:39

## 2022-07-08 RX ADMIN — CEFAZOLIN 2000 MG: 2 INJECTION, POWDER, FOR SOLUTION INTRAMUSCULAR; INTRAVENOUS at 11:41

## 2022-07-08 RX ADMIN — SODIUM CHLORIDE, PRESERVATIVE FREE 10 ML: 5 INJECTION INTRAVENOUS at 11:39

## 2022-07-08 RX ADMIN — POLYETHYLENE GLYCOL 3350 17 G: 17 POWDER, FOR SOLUTION ORAL at 11:39

## 2022-07-08 RX ADMIN — MICONAZOLE NITRATE: 20 POWDER TOPICAL at 11:55

## 2022-07-08 RX ADMIN — PANTOPRAZOLE SODIUM 40 MG: 40 TABLET, DELAYED RELEASE ORAL at 06:10

## 2022-07-08 ASSESSMENT — PAIN SCALES - GENERAL: PAINLEVEL_OUTOF10: 0

## 2022-07-08 ASSESSMENT — PAIN SCALES - WONG BAKER: WONGBAKER_NUMERICALRESPONSE: 0

## 2022-07-08 NOTE — PROGRESS NOTES
SURGERY      HIP SURGERY Right 5/1/2015    ORIF    LEG SURGERY Right 11/2/2021    RIGHT LOWER EXTREMITY ADVANCEMENT FLAP AND SPLIT THICKNESS SKIN GRAFT PLACEMENT; (WOUND- 10 CM X 5.5 CM; CLOSURE- 6 CM X 5.5 CM; SKIN GRAFT- 7.2 CM X 5 CM) performed by Tameka Hawk MD at [address] N/A 6/11/2020    [address] performed by Akanksha Orellana MD at [address] N/A 5/4/2020    EGD BIOPSY performed by Akanksha Orellana MD at [facility] ER ENDOSCOPY       Family History   Problem Relation Age of Onset    Heart Disease Father         reports that he quit smoking about 52 years ago. He has never used smokeless tobacco. He reports that he does not drink alcohol and does not use drugs.     Allergies:  Bactrim [sulfamethoxazole-trimethoprim]    Current Medications:    ceFAZolin (ANCEF) 2,000 mg in sodium chloride 0.9 % 50 mL IVPB (mini-bag), Once  furosemide (LASIX) tablet 40 mg, Daily  baclofen intrathecal pump - PLACEDHOLDER, Continuous  miconazole (MICOTIN) 2 % cream, PRN  ammonium lactate (LAC-HYDRIN) 12 % lotion, PRN  miconazole (MICOTIN) 2 % powder, BID  SMOG Enema, Once  polyethylene glycol (GLYCOLAX) packet 17 g, BID  perflutren lipid microspheres (DEFINITY) injection 1.65 mg, ONCE PRN  fluconazole (DIFLUCAN) tablet 150 mg, Weekly  albuterol (PROVENTIL) nebulizer solution 2.5 mg, Q4H PRN  aspirin chewable tablet 81 mg, Daily  atorvastatin (LIPITOR) tablet 80 mg, Nightly  gabapentin (NEURONTIN) tablet 600 mg, BID  levothyroxine (SYNTHROID) tablet 75 mcg, QAM AC  pantoprazole (PROTONIX) tablet 40 mg, QAM AC  tamsulosin (FLOMAX) capsule 0.8 mg, Daily  sodium chloride flush 0.9 % injection 5-40 mL, 2 times per day  sodium chloride flush 0.9 % injection 5-40 mL, PRN  0.9 % sodium chloride infusion, PRN  [Held by provider] enoxaparin Sodium (LOVENOX) injection 30 mg, BID  ondansetron (ZOFRAN-ODT) disintegrating tablet 4 mg, Q8H PRN Or  ondansetron (ZOFRAN) injection 4 mg, Q6H PRN  acetaminophen (TYLENOL) tablet 650 mg, Q6H PRN   Or  acetaminophen (TYLENOL) suppository 650 mg, Q6H PRN  traZODone (DESYREL) tablet 25 mg, Nightly  melatonin disintegrating tablet 5 mg, Nightly PRN        Review of Systems:   14 point ROS obtained but were negative except mentioned in HPI      Physical exam:     Vitals:  /67   Pulse 71   Temp 97.7 °F (36.5 °C) (Oral)   Resp 16   Ht 5' 11\" (1.803 m)   Wt 218 lb 7.6 oz (99.1 kg)   SpO2 94%   BMI 30.47 kg/m²   Constitutional:  OAA X3 NAD  Skin: no rash, turgor wnl  Heent:  eomi, mmm  Neck: no bruits or jvd noted  Cardiovascular:  S1, S2 without m/r/g  Respiratory: CTA B without w/r/r  Abdomen:  +bs, soft, nt, nd  Ext: + lower extremity edema  Psychiatric: mood and affect appropriate  Musculoskeletal:  Rom, muscular strength intact    Data:   Labs:  CBC:   Recent Labs     07/06/22  0730 07/07/22  0744 07/08/22  0741   WBC 5.9 7.0 7.1   HGB 10.2* 10.9* 10.8*    135 155     BMP:    Recent Labs     07/06/22  0730 07/07/22  0744 07/08/22  0741   * 138 132*   K 4.8 5.3* 5.2*   CL 96* 95* 92*   CO2 31 32 32   BUN 69* 73* 71*   CREATININE 1.1 1.2 1.0   GLUCOSE 91 89 97     Ca/Mg/Phos:   Recent Labs     07/06/22  0730 07/07/22  0744 07/08/22  0741   CALCIUM 8.4 8.7 8.8   MG 2.40 2.70* 2.80*     Hepatic: No results for input(s): AST, ALT, ALB, BILITOT, ALKPHOS in the last 72 hours. Troponin: No results for input(s): TROPONINI in the last 72 hours. BNP: No results for input(s): BNP in the last 72 hours. Lipids: No results for input(s): CHOL, TRIG, HDL, LDLCALC, LABVLDL in the last 72 hours. ABGs: No results for input(s): PHART, PO2ART, NGI0QLS in the last 72 hours. INR: No results for input(s): INR in the last 72 hours.   UA:No results for input(s): Spring Dinero, Celi Huang, UROBILINOGEN, Mariam Awan, Radha Proper in the last 72 hours.   Urine Microscopic: No results for input(s): LABCAST, BACTERIA, COMU, HYALCAST, WBCUA, RBCUA, EPIU in the last 72 hours. Urine Culture: No results for input(s): LABURIN in the last 72 hours. Urine Chemistry:   Recent Labs     07/06/22  1608   LABCREA 41.6   PROTEINUR 13.00*             IMAGING:  XR ABDOMEN (KUB) (SINGLE AP VIEW)   Final Result      Large amount of colonic stool      XR FOOT LEFT (MIN 3 VIEWS)   Final Result      1. No acute osseous findings. 2.  Post partial 5th ray and dictation. VL Extremity Venous Bilateral    (Results Pending)       Assessment/Plan   1. Hyperkalemia     2. HTN    3. Anemia    4. Acid- base/ Electrolyte imbalance     5.  Ac cystitis     Plan   - Abx   - Low K diet   - cont diuretics  - BNP nl   - inc protein intake   - labs in am       Recommend to dose adjust all medications  based on renal functions  Maintain SBP> 90 mmHg   Daily weights   AVOID NSAIDs  Avoid Nephrotoxins  Monitor Intake/Output  Call if significant decrease in urine output                   Thank you for allowing us to participate in care of Naye Purvis MD  Feel free to contact me   [facility] [address]  Office : [phone]  Fax :[phone]

## 2022-07-08 NOTE — CARE COORDINATION
BAYRON spoke with the AIS RN, Staceysebastian Sheldon [phone] who is scheduled to come to [facility] today to refill pt's pain pump. Jodee Chung reported that there is an issue with shipping and so she has to come tomorrow to refill the pump. She is aware that pt will likely DC back to [facility] today, and she will plan to refill pump tomorrow at [facility].         Electronically signed by ARIC Henley, ARIELW on 7/8/2022 at 12:14 PM

## 2022-07-08 NOTE — PROGRESS NOTES
Progress Note    Admit Date: 6/28/2022  Day: 9  Diet: ADULT DIET; Regular; Low Fat/Low Chol/High Fiber/2 gm Na; Low Potassium (Less than 3000 mg/day)  ADULT ORAL NUTRITION SUPPLEMENT; Dinner, Breakfast; Renal Oral Supplement  ADULT ORAL NUTRITION SUPPLEMENT; Lunch, Dinner; Renal Oral Supplement    CC: L abdominal pain    Interval history: Pt seen and examined bedside. Pt denies abdominal pain this morning. Pt reports no new symptoms, has no other concerns or complaints. Medications:     Scheduled Meds:   ceFAZolin  2,000 mg IntraVENous Once    furosemide  40 mg Oral Daily    miconazole   Topical BID    SMOG Enema  330 mL Rectal Once    polyethylene glycol  17 g Oral BID    fluconazole  150 mg Oral Weekly    aspirin  81 mg Oral Daily    atorvastatin  80 mg Oral Nightly    gabapentin  600 mg Oral BID    levothyroxine  75 mcg Oral QAM AC    pantoprazole  40 mg Oral QAM AC    tamsulosin  0.8 mg Oral Daily    sodium chloride flush  5-40 mL IntraVENous 2 times per day    [Held by provider] enoxaparin  30 mg SubCUTAneous BID    traZODone  25 mg Oral Nightly     Continuous Infusions:   baclofen      sodium chloride 25 mL (06/30/22 1143)     PRN Meds:miconazole, ammonium lactate, perflutren lipid microspheres, albuterol, sodium chloride flush, sodium chloride, ondansetron **OR** ondansetron, acetaminophen **OR** acetaminophen, melatonin    Objective:   Vitals:   T-max:  Patient Vitals for the past 8 hrs:   BP Temp Temp src Pulse SpO2   07/08/22 0941 113/67 97.7 °F (36.5 °C) Oral 71 94 %       Intake/Output Summary (Last 24 hours) at 7/8/2022 1007  Last data filed at 7/8/2022 0941  Gross per 24 hour   Intake --   Output 3650 ml   Net -3650 ml       Review of Systems negative    Physical Exam   Constitutional:       General: He is not in acute distress. Appearance: He is not diaphoretic. HENT:      Head: Normocephalic and atraumatic.       Right Ear: External ear normal.      Left Ear: External ear normal.   Eyes:      General:         Right eye: No discharge. Left eye: No discharge. Extraocular Movements: Extraocular movements intact. Conjunctiva/sclera: Conjunctivae normal.   Cardiovascular:      Rate and Rhythm: Normal rate and regular rhythm. Pulmonary:      Effort: Pulmonary effort is normal. No respiratory distress. Abdominal:      General: There is distension. Tenderness: There is no abdominal tenderness. Musculoskeletal:         General: Swelling (abdominal, scrotal, BL thighs) present. Skin:     General: Skin is warm and dry. Neurological:      Mental Status: He is alert and oriented to person, place, and time. Mental status is at baseline. LABS:    CBC:   Recent Labs     07/06/22 0730 07/07/22 0744 07/08/22 0741   WBC 5.9 7.0 7.1   HGB 10.2* 10.9* 10.8*   HCT 31.4* 33.4* 33.0*    135 155   MCV 94.8 96.0 94.2     Renal:    Recent Labs     07/06/22 0730 07/07/22 0744 07/08/22 0741   * 138 132*   K 4.8 5.3* 5.2*   CL 96* 95* 92*   CO2 31 32 32   BUN 69* 73* 71*   CREATININE 1.1 1.2 1.0   GLUCOSE 91 89 97   CALCIUM 8.4 8.7 8.8   MG 2.40 2.70* 2.80*   ANIONGAP 8 11 8     Hepatic: No results for input(s): AST, ALT, BILITOT, BILIDIR, PROT, LABALBU, ALKPHOS in the last 72 hours. Troponin: No results for input(s): TROPONINI in the last 72 hours. BNP: No results for input(s): BNP in the last 72 hours. Lipids: No results for input(s): CHOL, HDL in the last 72 hours. Invalid input(s): LDLCALCU, TRIGLYCERIDE  ABGs:  No results for input(s): PHART, FWY6AFI, PO2ART, VXK8YAH, BEART, THGBART, H8EWYIEL, RLG1DNM in the last 72 hours. INR: No results for input(s): INR in the last 72 hours. Lactate: No results for input(s): LACTATE in the last 72 hours.   Cultures:  -----------------------------------------------------------------  RAD:   XR ABDOMEN (KUB) (SINGLE AP VIEW)   Final Result      Large amount of colonic stool      XR FOOT LEFT (MIN 3 VIEWS) Final Result      1. No acute osseous findings. 2.  Post partial 5th ray and dictation. VL Extremity Venous Bilateral    (Results Pending)       Assessment/Plan:   Dysuria 2/2 acute complicated cystitis  Hx of MDRO UTI  Chronic indwelling schneider 2/2 neurogenic bladder  Scrotal swelling  H/O Proteus MDRO UTI 4/21, had multiple UTI with E. Fecalis, proteus, pseudomonas, candida in the past   Chronic indwelling catheter since 2019 for neurogenic bladder  UA with 10-20 WBC, positive nitrite, moderate LE and 4+ bacteria  General swelling around pelvic area possibly due to venous stasis as pt is bed bound. -IV ceftriaxone course completed.   -switch lasix 80 mg IV bid to 40 mg PO daily     Hyperkalemia   Last known K 5.2  -continue lasix      Intertrigo likely 2/2 fungal rash  - Miconazole powder to keep area dry     Abdominal pain and bloating 2/2 constipation  KUB with considerable stool burden. - scheduled glycolax BID     Lower extremity wounds   Left MT osteomyelitis s/p L partial ray resection  MRSA +ve wound cx on 05/25/22   B/L LE wrapped in dressings   Was recently admitted 5/25/22 and treated with abx   -Podiatry seeing  -wound care  -Does not look infected      Anemia of chronic disease  Normocytic, low iron, low TIBC, normal ferritin  -monitor CBC     Suspected GIB  Low Hgb, Elevated BUN [71] with normal Cr [1.0], Low RBCs  -monitor CBC  -consider GI eval     Pt is bed bound - PT, OT eval      Chronic medical problems   CAD s/p CABG in 2013   -continue home aspirin 81 mg daily   -continue lipitor 80 mg nightly      Hypothyroidism   -continue home synthyroid 75 mcg      BPH  -continue home flomax 0.8 mg PO daily   -chronic indwelling foleys catheter      Mood disorders   -continue home trazodone 25 mg PO nightly       Code Status:Full code  FEN: ADULT DIET;  Regular; Low Fat/Low Chol/High Fiber/2 gm Na  PPX: lovenox   DISPO: Tino Estrada MD, PGY-1  07/08/22  10:07 AM    This patient has been staffed and discussed with Ok Ramirez MD.

## 2022-07-08 NOTE — PROGRESS NOTES
LE.  Right lower extremity:     Scattered abrasions noted along the anterior and lateral aspect of the right leg. Partial-thickness ulcerations noted to the lateral aspect of the right foot at the base of the fifth metatarsal and the head of the fifth metatarsal with a hyperkeratotic periwound. No acute signs of infection noted. No drainage noted. No fluctuance, crepitus, or malodor noted. No tunneling or tracking noted. Left lower extremity:    Scattered abrasions noted to the anterior aspect of the left leg. Incision site on the lateral aspect of the left foot skin edges noted to be well coapted with graft incorporation noted to the distal aspect of the surgical incision. No fluctuance, crepitus, or malodor noted. No tunneling or tracking noted. No active drainage noted. No acute signs of infection noted. MUSCULOSKELETAL: No pain to palpation of the foot or ankle b/l. Muscle strength 5/5 for all pedal muscle groups. Ankle joint ROM is decreased in dorsiflexion with the knee extended. No obvious biomechanical abnormalities    IMAGING:  XR Foot Left   Narrative   EXAM: LEFT FOOT 3 VIEWS       INDICATION: left foot partial 5th ray resection follow up       COMPARISON: 5/29/22       FINDINGS:       Bones are severely osteopenic limiting detail. 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 No acute fracture or malalignment. No definite erosion. Mild generalized swelling. Extensive vascular calcifications.           Impression       1.  No acute osseous findings.    2.  Post partial 5th ray and dictation.          ASSESSMENT/PLAN  - Partial thickness ulcerations, lateral right 5th digit (Bueno 1)  - S/p left foot partial 5th ray resection with graft application (DOS 20/24/8915)  - Superficial abrasions, bilateral lower extremity  - Peripheral Neuropathy, bilateral lower extremit     -Patient examined and evaluated at bedside   -Hypotensive otherwise VSS, No updated CBC this AM  -CRP <3, ESR 14  -Prealbumin 14.1 (oral nutrition supplement ordered) -Hemoglobin A1c 5.7 (6/21)  -Wound culture not taken as no active drainage noted or acute signs of infection noted. -Imaging reviewed, impression noted above. -Dressing applied to bilatleral LE consisting of adaptic, DSD, and Ace with gentle compression   -Utilizing a sterile pickups and scissors half of the stravix graft noted to the distal lateral aspect of the left foot was debrided out down to subcutaneous tissue with healthy bleeding noted. Estimated blood loss 1 cc. Hemostasis achieved with direct pressure  -No Abx necessary from podiatric standpoint   -Patient is weightbearing as tolerated to bilateral lower extremity in protective shoe gear or postop shoe, but while at rest patient is to be continuously offloaded in Prevalon boots.      Dispo: Patient s/p left foot partial 5th ray resection with graft application (DOS 7/49/7935). No antibiotics necessary from podiatric standpoint. Patient is stable for discharge from podiatric standpoint pending medical clearance. The patient was examined and evaluated with Dr. Sanford Se, DPM Otelia Blizzard, DPM   Podiatric Resident PGY2  Pager [phone] or Ramandeep  7/8/2022, 7:12 AM      Patient was seen and evaluated at bedside. Agree with residents assessment and treatment plan.   Matthew Dennis DPM

## 2022-07-08 NOTE — CARE COORDINATION
Case Management Assessment            Discharge Note                    Date / Time of Note: 7/8/2022 12:19 PM                  Discharge Note Completed by: ARIC Waite, CARLIN    Patient Name: Marleny Arndt   YOB: 1940  Diagnosis: UTI (urinary tract infection) [H63.3]  Complicated UTI (urinary tract infection) [N39.0]   Date / Time: 6/28/2022  9:16 PM    [facility]    Hospitalization in the last 30 days: Yes    Advance Directives:  Code Status: Full Code  [place] DNR form completed and on chart: Yes    Financial:  Payor: [facility] / Plan: [facility] HMO / Product Type: *No Product type* /      Pharmacy:    [facility] (Now [email or web address] [facility])  Delilah Valero [phone] - F [phone]  [address] [place] [address]  Phone: [phone] Fax: [phone]      Assistance purchasing medications?: Potential Assistance Purchasing Medications: No  Assistance provided by Case Management: None at this time    Does patient want to participate in local refill/ meds to beds program?: No    Meds To Beds General Rules:  1. Can ONLY be done Monday- Friday between 8:30am-5pm  2. Prescription(s) must be in pharmacy by 3pm to be filled same day  3. Copy of patient's insurance/ prescription drug card and patient face sheet must be sent along with the prescription(s)  4. Cost of Rx cannot be added to hospital bill. If financial assistance is needed, please contact unit  or ;  or  CANNOT provide pharmacy voucher for patients co-pays  5.  Patients can then  the prescription on their way out of the hospital at discharge, or pharmacy can deliver to the bedside if staff is available. (payment due at time of pick-up or delivery - cash, check, or card accepted)     Able to afford home medications/ co-pay costs: Yes    ADLS:  Current PT AM-PAC Score:   /24  Current OT AM-PAC Score:   /24      DISCHARGE Disposition: [address]: [address] Phone: [phone] Fax: [phone]    LOC at discharge: Not Applicable  AZRA Completed: Yes    Notification completed in HENS/PAS?:  Not Applicable    IMM Completed:   Not Indicated    Transportation:  Transportation PLAN for discharge: EMS transportation   Mode of Transport: Ambulance stretcher - BLS  Reason for medical transport: Bed confined: Meets the following criteria 1) unable to get out of bed without assistance or ambulate, 2) unable to safely sit up in a wheelchair, 3) unable to maintain erect seating position in a chair for time needed for transport  Name of [address] [place] [address], [address]: [place] [facility]   Phone: [phone]  Time of Transport: 7:30pm    Transport form completed: Yes    Home Oxygen and Respiratory Equipment:  Oxygen needed at discharge?: No    Dialysis:  Dialysis patient: No      Referrals made at [facility] for outpatient continued care:  Not Applicable    Additional CM Notes:  Pt will DC back to [address] today. AIS RN, Thaddeus Case [phone] is aware that pt will DC back to [address] today, and she will plan to refill pump tomorrow at [address]. CM left [facility] at [facility] [phone] and spoke with pt's RN at the facility to update with this info and EMS transport time. CM also faxed AVS.  No other needs at this time. The Plan for Transition of Care is related to the following treatment goals of UTI (urinary tract infection) [M49.4]  Complicated UTI (urinary tract infection) [N39.0]    The Patient and/or patient representative Ardyce Cheadle and his family were provided with a choice of provider and agrees with the discharge plan Yes    Freedom of choice list was provided with basic dialogue that supports the patient's individualized plan of care/goals and shares the quality data associated with the providers.  Yes    Care Transitions patient: ARIC Good, [address] [address]  Case Management Department  Ph: [phone]  Fax: [phone]

## 2022-07-18 RX ORDER — FUROSEMIDE 40 MG/1
TABLET ORAL
Qty: 20 TABLET | Refills: 10 | OUTPATIENT
Start: 2022-07-18

## 2022-07-29 PROBLEM — N39.0 UTI (URINARY TRACT INFECTION): Status: RESOLVED | Noted: 2022-06-29 | Resolved: 2022-07-29

## 2022-08-30 ENCOUNTER — APPOINTMENT (OUTPATIENT)
Dept: GENERAL RADIOLOGY | Age: 82
DRG: 475 | End: 2022-08-30
Payer: MEDICARE

## 2022-08-30 ENCOUNTER — HOSPITAL ENCOUNTER (INPATIENT)
Age: 82
LOS: 8 days | Discharge: NURSING, REHAB OR LONG-TERM CARE | DRG: 475 | End: 2022-09-07
Attending: STUDENT IN AN ORGANIZED HEALTH CARE EDUCATION/TRAINING PROGRAM | Admitting: INTERNAL MEDICINE
Payer: MEDICARE

## 2022-08-30 DIAGNOSIS — T14.8XXA WOUND INFECTION: ICD-10-CM

## 2022-08-30 DIAGNOSIS — L08.9 DIABETIC FOOT INFECTION (HCC): Primary | ICD-10-CM

## 2022-08-30 DIAGNOSIS — E11.628 DIABETIC FOOT INFECTION (HCC): Primary | ICD-10-CM

## 2022-08-30 DIAGNOSIS — L08.9 WOUND INFECTION: ICD-10-CM

## 2022-08-30 DIAGNOSIS — E11.628 DIABETIC INFECTION OF RIGHT FOOT (HCC): ICD-10-CM

## 2022-08-30 DIAGNOSIS — L08.9 DIABETIC INFECTION OF RIGHT FOOT (HCC): ICD-10-CM

## 2022-08-30 DIAGNOSIS — K92.2 ACUTE GI BLEEDING: ICD-10-CM

## 2022-08-30 LAB
ANION GAP SERPL CALCULATED.3IONS-SCNC: 12 MMOL/L (ref 3–16)
BASOPHILS ABSOLUTE: 0.1 K/UL (ref 0–0.2)
BASOPHILS RELATIVE PERCENT: 0.9 %
BUN BLDV-MCNC: 36 MG/DL (ref 7–20)
C-REACTIVE PROTEIN: 25.4 MG/L (ref 0–5.1)
CALCIUM SERPL-MCNC: 8.3 MG/DL (ref 8.3–10.6)
CHLORIDE BLD-SCNC: 102 MMOL/L (ref 99–110)
CO2: 26 MMOL/L (ref 21–32)
CREAT SERPL-MCNC: 1.2 MG/DL (ref 0.8–1.3)
EOSINOPHILS ABSOLUTE: 1.2 K/UL (ref 0–0.6)
EOSINOPHILS RELATIVE PERCENT: 12.7 %
GFR AFRICAN AMERICAN: >60
GFR NON-AFRICAN AMERICAN: 58
GLUCOSE BLD-MCNC: 106 MG/DL (ref 70–99)
HCT VFR BLD CALC: 35.2 % (ref 40.5–52.5)
HEMOGLOBIN: 11.5 G/DL (ref 13.5–17.5)
LYMPHOCYTES ABSOLUTE: 1.7 K/UL (ref 1–5.1)
LYMPHOCYTES RELATIVE PERCENT: 17.5 %
MCH RBC QN AUTO: 31 PG (ref 26–34)
MCHC RBC AUTO-ENTMCNC: 32.6 G/DL (ref 31–36)
MCV RBC AUTO: 94.9 FL (ref 80–100)
MONOCYTES ABSOLUTE: 0.6 K/UL (ref 0–1.3)
MONOCYTES RELATIVE PERCENT: 6 %
NEUTROPHILS ABSOLUTE: 6.1 K/UL (ref 1.7–7.7)
NEUTROPHILS RELATIVE PERCENT: 62.9 %
PDW BLD-RTO: 15.9 % (ref 12.4–15.4)
PLATELET # BLD: 200 K/UL (ref 135–450)
PMV BLD AUTO: 9.4 FL (ref 5–10.5)
POTASSIUM REFLEX MAGNESIUM: 4.4 MMOL/L (ref 3.5–5.1)
RBC # BLD: 3.71 M/UL (ref 4.2–5.9)
SEDIMENTATION RATE, ERYTHROCYTE: 56 MM/HR (ref 0–20)
SODIUM BLD-SCNC: 140 MMOL/L (ref 136–145)
WBC # BLD: 9.6 K/UL (ref 4–11)

## 2022-08-30 PROCEDURE — 86140 C-REACTIVE PROTEIN: CPT

## 2022-08-30 PROCEDURE — 85652 RBC SED RATE AUTOMATED: CPT

## 2022-08-30 PROCEDURE — 36415 COLL VENOUS BLD VENIPUNCTURE: CPT

## 2022-08-30 PROCEDURE — 2580000003 HC RX 258: Performed by: EMERGENCY MEDICINE

## 2022-08-30 PROCEDURE — 87070 CULTURE OTHR SPECIMN AEROBIC: CPT

## 2022-08-30 PROCEDURE — 73630 X-RAY EXAM OF FOOT: CPT

## 2022-08-30 PROCEDURE — 85025 COMPLETE CBC W/AUTO DIFF WBC: CPT

## 2022-08-30 PROCEDURE — 84134 ASSAY OF PREALBUMIN: CPT

## 2022-08-30 PROCEDURE — 87186 SC STD MICRODIL/AGAR DIL: CPT

## 2022-08-30 PROCEDURE — 80048 BASIC METABOLIC PNL TOTAL CA: CPT

## 2022-08-30 PROCEDURE — 99285 EMERGENCY DEPT VISIT HI MDM: CPT

## 2022-08-30 PROCEDURE — 1200000000 HC SEMI PRIVATE

## 2022-08-30 PROCEDURE — 73590 X-RAY EXAM OF LOWER LEG: CPT

## 2022-08-30 PROCEDURE — 87184 SC STD DISK METHOD PER PLATE: CPT

## 2022-08-30 PROCEDURE — 87205 SMEAR GRAM STAIN: CPT

## 2022-08-30 PROCEDURE — 87040 BLOOD CULTURE FOR BACTERIA: CPT

## 2022-08-30 PROCEDURE — 6360000002 HC RX W HCPCS: Performed by: EMERGENCY MEDICINE

## 2022-08-30 PROCEDURE — 87077 CULTURE AEROBIC IDENTIFY: CPT

## 2022-08-30 RX ORDER — TAMSULOSIN HYDROCHLORIDE 0.4 MG/1
0.8 CAPSULE ORAL DAILY
Status: DISCONTINUED | OUTPATIENT
Start: 2022-08-31 | End: 2022-09-07 | Stop reason: HOSPADM

## 2022-08-30 RX ORDER — SODIUM CHLORIDE 0.9 % (FLUSH) 0.9 %
5-40 SYRINGE (ML) INJECTION EVERY 12 HOURS SCHEDULED
Status: DISCONTINUED | OUTPATIENT
Start: 2022-08-31 | End: 2022-09-07 | Stop reason: HOSPADM

## 2022-08-30 RX ORDER — FUROSEMIDE 40 MG/1
40 TABLET ORAL DAILY
Status: DISCONTINUED | OUTPATIENT
Start: 2022-08-31 | End: 2022-09-07 | Stop reason: HOSPADM

## 2022-08-30 RX ORDER — LEVOTHYROXINE SODIUM 0.07 MG/1
75 TABLET ORAL DAILY
Status: DISCONTINUED | OUTPATIENT
Start: 2022-08-31 | End: 2022-09-07 | Stop reason: HOSPADM

## 2022-08-30 RX ORDER — ACETAMINOPHEN 325 MG/1
650 TABLET ORAL EVERY 6 HOURS PRN
COMMUNITY

## 2022-08-30 RX ORDER — LANOLIN ALCOHOL/MO/W.PET/CERES
1000 CREAM (GRAM) TOPICAL DAILY
Status: DISCONTINUED | OUTPATIENT
Start: 2022-08-31 | End: 2022-09-07 | Stop reason: HOSPADM

## 2022-08-30 RX ORDER — ONDANSETRON 4 MG/1
4 TABLET, ORALLY DISINTEGRATING ORAL EVERY 8 HOURS PRN
Status: DISCONTINUED | OUTPATIENT
Start: 2022-08-30 | End: 2022-09-07 | Stop reason: HOSPADM

## 2022-08-30 RX ORDER — ACETAMINOPHEN 325 MG/1
650 TABLET ORAL EVERY 6 HOURS PRN
Status: DISCONTINUED | OUTPATIENT
Start: 2022-08-30 | End: 2022-09-07 | Stop reason: HOSPADM

## 2022-08-30 RX ORDER — POLYETHYLENE GLYCOL 3350 17 G/17G
17 POWDER, FOR SOLUTION ORAL DAILY PRN
Status: DISCONTINUED | OUTPATIENT
Start: 2022-08-30 | End: 2022-09-07 | Stop reason: HOSPADM

## 2022-08-30 RX ORDER — MECOBALAMIN 5000 MCG
5 TABLET,DISINTEGRATING ORAL NIGHTLY
Status: DISCONTINUED | OUTPATIENT
Start: 2022-08-31 | End: 2022-09-07 | Stop reason: HOSPADM

## 2022-08-30 RX ORDER — ASPIRIN 81 MG/1
81 TABLET, CHEWABLE ORAL DAILY
Status: DISCONTINUED | OUTPATIENT
Start: 2022-08-31 | End: 2022-09-02

## 2022-08-30 RX ORDER — MORPHINE SULFATE 4 MG/ML
4 INJECTION, SOLUTION INTRAMUSCULAR; INTRAVENOUS ONCE
Status: COMPLETED | OUTPATIENT
Start: 2022-08-30 | End: 2022-08-30

## 2022-08-30 RX ORDER — SODIUM CHLORIDE 9 MG/ML
INJECTION, SOLUTION INTRAVENOUS PRN
Status: DISCONTINUED | OUTPATIENT
Start: 2022-08-30 | End: 2022-09-07 | Stop reason: HOSPADM

## 2022-08-30 RX ORDER — GABAPENTIN 600 MG/1
600 TABLET ORAL 2 TIMES DAILY
Status: DISCONTINUED | OUTPATIENT
Start: 2022-08-31 | End: 2022-09-07 | Stop reason: HOSPADM

## 2022-08-30 RX ORDER — ONDANSETRON 2 MG/ML
4 INJECTION INTRAMUSCULAR; INTRAVENOUS EVERY 6 HOURS PRN
Status: DISCONTINUED | OUTPATIENT
Start: 2022-08-30 | End: 2022-09-07 | Stop reason: HOSPADM

## 2022-08-30 RX ORDER — TRAZODONE HYDROCHLORIDE 50 MG/1
25 TABLET ORAL NIGHTLY
Status: DISCONTINUED | OUTPATIENT
Start: 2022-08-31 | End: 2022-08-31

## 2022-08-30 RX ORDER — HEPARIN SODIUM 5000 [USP'U]/ML
5000 INJECTION, SOLUTION INTRAVENOUS; SUBCUTANEOUS EVERY 8 HOURS SCHEDULED
Status: COMPLETED | OUTPATIENT
Start: 2022-08-31 | End: 2022-09-01

## 2022-08-30 RX ORDER — ATORVASTATIN CALCIUM 40 MG/1
80 TABLET, FILM COATED ORAL NIGHTLY
Status: DISCONTINUED | OUTPATIENT
Start: 2022-08-31 | End: 2022-09-07 | Stop reason: HOSPADM

## 2022-08-30 RX ORDER — SODIUM CHLORIDE 0.9 % (FLUSH) 0.9 %
5-40 SYRINGE (ML) INJECTION PRN
Status: DISCONTINUED | OUTPATIENT
Start: 2022-08-30 | End: 2022-09-07 | Stop reason: HOSPADM

## 2022-08-30 RX ORDER — FERROUS SULFATE 325(65) MG
325 TABLET ORAL 2 TIMES DAILY
COMMUNITY

## 2022-08-30 RX ORDER — DOCUSATE SODIUM 100 MG/1
100 CAPSULE, LIQUID FILLED ORAL
COMMUNITY

## 2022-08-30 RX ORDER — ACETAMINOPHEN 650 MG/1
650 SUPPOSITORY RECTAL EVERY 6 HOURS PRN
Status: DISCONTINUED | OUTPATIENT
Start: 2022-08-30 | End: 2022-09-07 | Stop reason: HOSPADM

## 2022-08-30 RX ADMIN — MORPHINE SULFATE 4 MG: 4 INJECTION INTRAVENOUS at 17:41

## 2022-08-30 RX ADMIN — PIPERACILLIN AND TAZOBACTAM 3375 MG: 3; .375 INJECTION, POWDER, FOR SOLUTION INTRAVENOUS at 17:40

## 2022-08-30 RX ADMIN — VANCOMYCIN HYDROCHLORIDE 1500 MG: 10 INJECTION, POWDER, LYOPHILIZED, FOR SOLUTION INTRAVENOUS at 18:12

## 2022-08-30 ASSESSMENT — PAIN DESCRIPTION - PAIN TYPE: TYPE: ACUTE PAIN

## 2022-08-30 ASSESSMENT — PAIN DESCRIPTION - LOCATION
LOCATION: ANKLE
LOCATION: FOOT

## 2022-08-30 ASSESSMENT — ENCOUNTER SYMPTOMS
BACK PAIN: 0
SORE THROAT: 0
SINUS PRESSURE: 0
ABDOMINAL DISTENTION: 0
ABDOMINAL PAIN: 0
COUGH: 0
BLOOD IN STOOL: 0
RECTAL PAIN: 0
APNEA: 0
VOMITING: 0
NAUSEA: 0
COLOR CHANGE: 0
PHOTOPHOBIA: 0
SHORTNESS OF BREATH: 0

## 2022-08-30 ASSESSMENT — PAIN SCALES - GENERAL
PAINLEVEL_OUTOF10: 4
PAINLEVEL_OUTOF10: 6

## 2022-08-30 ASSESSMENT — PAIN DESCRIPTION - FREQUENCY: FREQUENCY: CONTINUOUS

## 2022-08-30 ASSESSMENT — PAIN DESCRIPTION - ORIENTATION: ORIENTATION: RIGHT;LEFT

## 2022-08-30 ASSESSMENT — PAIN DESCRIPTION - DESCRIPTORS: DESCRIPTORS: ACHING

## 2022-08-30 ASSESSMENT — LIFESTYLE VARIABLES
HOW OFTEN DO YOU HAVE A DRINK CONTAINING ALCOHOL: NEVER
HOW MANY STANDARD DRINKS CONTAINING ALCOHOL DO YOU HAVE ON A TYPICAL DAY: PATIENT DOES NOT DRINK

## 2022-08-30 ASSESSMENT — PAIN - FUNCTIONAL ASSESSMENT: PAIN_FUNCTIONAL_ASSESSMENT: 0-10

## 2022-08-30 NOTE — PROGRESS NOTES
Surgical History:        Procedure Laterality Date    BACK SURGERY  2006    lower lumbar    CORONARY ARTERY BYPASS GRAFT  12/13/2013    CABG x 5 (Dr Davon Khan), svg to diag, om1 and 3, distal rca, kelly to lad. CYSTOSCOPY N/A 1/10/2019    CYSTOSCOPY performed by Norma Gill MD at [facility] [employer] Left 5/29/2022    LEFT FOOT PARTIAL FIFTH RAY RESECTION WITH EXCISIONAL DEBRIDEMENT OF MUSCLE AND FASCIA, WITH APPLICATION OF GRAFT performed by Antione Rodriges DPM at [address] [place] [address] Right 5/1/2015    ORIF    LEG SURGERY Right 11/2/2021    RIGHT LOWER EXTREMITY ADVANCEMENT FLAP AND SPLIT THICKNESS SKIN GRAFT PLACEMENT; (WOUND- 10 CM X 5.5 CM; CLOSURE- 6 CM X 5.5 CM; SKIN GRAFT- 7.2 CM X 5 CM) performed by Maninder Yoo MD at [place] [address] N/A 6/11/2020    [facility] performed by Carol Ann Royal MD at [address] [facility] 5/4/2020    EGD BIOPSY performed by Carol Ann Royal MD at [place] [address] Admission:    Not in a hospital admission. Allergies:  Bactrim [sulfamethoxazole-trimethoprim]    Social History:   TOBACCO:   reports that he quit smoking about 52 years ago. He has never used smokeless tobacco.  ETOH:   reports no history of alcohol use. Patient currently lives in a nursing home    Family History:       Problem Relation Age of Onset    Heart Disease Father        Review of Systems   Constitutional:  Negative for activity change and appetite change. HENT:  Negative for dental problem, drooling, ear discharge, sinus pressure, sore throat and tinnitus. Respiratory:  Negative for apnea, cough and shortness of breath. Cardiovascular:  Negative for chest pain and leg swelling. Gastrointestinal:  Negative for abdominal distention. Genitourinary:  Negative for difficulty urinating, dysuria, frequency, genital sores, hematuria and penile pain.    Skin:  Negative for color change. Neurological:  Negative for speech difficulty, numbness and headaches. Psychiatric/Behavioral:  Negative for agitation, behavioral problems, confusion, self-injury and suicidal ideas. Physical Exam  Constitutional:       Appearance: Normal appearance. HENT:      Head: Normocephalic and atraumatic. Nose: Nose normal.      Mouth/Throat:      Mouth: Mucous membranes are moist.   Eyes:      Extraocular Movements: Extraocular movements intact. Pupils: Pupils are equal, round, and reactive to light. Cardiovascular:      Rate and Rhythm: Normal rate and regular rhythm. Heart sounds:     No gallop. Pulmonary:      Effort: No respiratory distress. Breath sounds: No wheezing or rales. Abdominal:      General: There is no distension. Palpations: There is no mass. Tenderness: There is no abdominal tenderness. There is no rebound. Hernia: No hernia is present. Musculoskeletal:         General: Tenderness present. No swelling. Right lower leg: Edema present. Left lower leg: Edema present. Comments: See picture below of Rt LE   Skin:     General: Skin is warm. Capillary Refill: Capillary refill takes less than 2 seconds. Neurological:      Mental Status: He is alert and oriented to person, place, and time. Cranial Nerves: No cranial nerve deficit. Sensory: No sensory deficit. Motor: No weakness. Gait: Gait normal.   Psychiatric:         Mood and Affect: Mood normal.         Behavior: Behavior normal.     Media Information  Document Information    Clinical Consent:  Wound      08/30/2022 17:34   Attached To:    Hospital Encounter on 8/30/22     Source Information    Arminda Villavicencio MD  [facility]          Vitals:    08/30/22 1700   BP: (!) 113/53   Pulse: 67   Resp: 15   Temp:    SpO2: 94%       DATA:    Labs:  CBC:   Recent Labs     08/30/22  1446   WBC 9.6   HGB 11.5*   HCT 35.2*          BMP:   Recent Labs     08/30/22  1446      K 4.4      CO2 26   BUN 36*   CREATININE 1.2   GLUCOSE 106*     LFT's: No results for input(s): AST, ALT, ALB, BILITOT, ALKPHOS in the last 72 hours. Troponin: No results for input(s): TROPONINI in the last 72 hours. BNP:No results for input(s): BNP in the last 72 hours. ABGs: No results for input(s): PHART, MSI2GDT, PO2ART in the last 72 hours. INR: No results for input(s): INR in the last 72 hours. U/A:No results for input(s): NITRITE, COLORU, PHUR, LABCAST, WBCUA, RBCUA, MUCUS, TRICHOMONAS, YEAST, BACTERIA, CLARITYU, SPECGRAV, LEUKOCYTESUR, UROBILINOGEN, BILIRUBINUR, BLOODU, GLUCOSEU, AMORPHOUS in the last 72 hours. Invalid input(s): KETONESU    XR FOOT RIGHT (MIN 3 VIEWS)   Final Result   1. Suspected fracture of the second metatarsal shaft, incompletely assessed in this study. 2. Diffuse osteopenia in the tibia and fibula as well as the foot. Soft tissue swelling in the foot. 3. Incomplete assessment of the digits. XR TIBIA FIBULA RIGHT (2 VIEWS)   Final Result   1. Suspected fracture of the second metatarsal shaft, incompletely assessed in this study. 2. Diffuse osteopenia in the tibia and fibula as well as the foot. Soft tissue swelling in the foot. 3. Incomplete assessment of the digits.               ASSESSMENT AND PLAN:    Cellulitis of right lower extremity  - The wound looks infected and he has a history of MRSA growing from his wound on a prior admission  - We will treat with vancomycin  - Blood cultures  - Trend CRP and Pro-Calc  - Podiatry already consulted from the ED      CAD s/p CABG in 2013   -continue home aspirin 81 mg daily   -continue lipitor 80 mg nightly      Hypothyroidism   -continue home synthyroid 75 mcg      BPH  -continue home flomax 0.8 mg PO daily   -chronic indwelling foleys catheter      Mood disorders   -continue home trazodone 25 mg PO nightly     Hx of Intertrigo likely 2/2 fungal rash  - Cont diflucan  - Miconazole powder to keep area dry       Will discuss with attending physician Dr. Sekou Encarnacino Status:Full code  FEN: Adult  PPX: SQH  DISPO: Anisa Snyder MD PGY-3  8/30/2022,  5:58 PM

## 2022-08-30 NOTE — CONSULTS
Department of Podiatry Consult Note  Resident       Reason for Consult:  diabetic foot infection right    Requesting Physician:  Brianna Child MD    CHIEF COMPLAINT:  Diabetic foot infection right    HISTORY OF PRESENT ILLNESS:                The patient is a 81 y.o. male with significant past medical history as listed below. Podiatry was consulted for concerns of increasing redness to right lower extremity. His wound care nurse was concerned for infection and recommend that the patient come to the [facility] emergency department. Patient has his dressing's changed regularly and states that he had a nasal swab performed at his long term care facility that tested positive for MRSA. The patient has a history of amputations to the left lower extremity but denies any issues with the lower extremity. He relates increased pain to the left knee. Patient admits fever but denies chills, nausea, vomiting, shortness of breath, chest pain. Patient has no other pedal complaints at this time.     Past Medical History:        Diagnosis Date    BPH (benign prostatic hyperplasia)     C. 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 CYSTOSCOPY N/A 1/10/2019    CYSTOSCOPY performed by Matt Will MD at [address] Left 5/29/2022    LEFT FOOT PARTIAL FIFTH RAY RESECTION WITH EXCISIONAL DEBRIDEMENT OF MUSCLE AND FASCIA, WITH APPLICATION OF GRAFT performed by Elyse Wagner DPM at [address] [place] [address] Right 5/1/2015    ORIF    LEG SURGERY Right 11/2/2021    RIGHT LOWER EXTREMITY ADVANCEMENT FLAP AND SPLIT THICKNESS SKIN GRAFT PLACEMENT; (WOUND- 10 CM X 5.5 CM; CLOSURE- 6 CM X 5.5 CM; SKIN GRAFT- 7.2 CM X 5 CM) performed by Bishop Ev MD at [place] [address] N/A 6/11/2020    [facility] performed by Jake Gruber MD at [address] [place] [address] 5/4/2020    EGD BIOPSY performed by Jake Gruber MD at [facility] ENDOSCOPY       Allergies:   Bactrim [sulfamethoxazole-trimethoprim]    Medications:   Home Meds  No current facility-administered medications on file prior to encounter. Current Outpatient Medications on File Prior to Encounter   Medication Sig Dispense Refill    miconazole (MICOTIN) 2 % cream Apply topically 2 times daily. 1 each 1    miconazole (MICOTIN) 2 % powder Apply topically 2 times daily. 45 g 1    furosemide (LASIX) 40 MG tablet Take 1 tablet by mouth daily 60 tablet 3    Nutritional Supplements (RONEY PO) Take 1 packet by mouth 2 times daily      Nutritional Supplements (ENSURE HIGH PROTEIN) LIQD Take 1 Can by mouth 2 times daily      Wound Dressings (MEPILEX EX) Apply topically Cleanse skin graft areas with normal saline, pat dry, apply mepilex foam board, change every 3 days as needed.       traZODone (DESYREL) 50 MG tablet Take 25 mg by mouth nightly      loperamide (IMODIUM) 2 MG capsule Take 2 mg by mouth 4 times daily as needed for Diarrhea      Melatonin 5 MG CAPS Take 1 capsule by mouth nightly levothyroxine (SYNTHROID) 75 MCG tablet Take 1 tablet by mouth Daily 30 tablet 3    albuterol sulfate HFA (PROVENTIL HFA) 108 (90 Base) MCG/ACT inhaler Inhale 2 puffs into the lungs every 6 hours as needed for Wheezing or Shortness of Breath 18 g 3    guaiFENesin (MUCINEX) 600 MG extended release tablet Take 1 tablet by mouth 2 times daily as needed for Congestion 60 tablet 2    ferrous sulfate (IRON 325) 325 (65 Fe) MG tablet Take 1 tablet by mouth 2 times daily (Patient not taking: Reported on 6/28/2022) 180 tablet 1    Balsam Peru-Castor Oil (VENELEX) OINT ointment Apply topically daily as needed      docusate sodium (COLACE) 100 MG capsule Take 100 mg by mouth daily  (Patient not taking: Reported on 6/28/2022)      gabapentin (NEURONTIN) 600 MG tablet Take 600 mg by mouth 2 times daily. dicyclomine (BENTYL) 20 MG tablet Take 20 mg by mouth 3 times daily as needed      vitamin B-12 (CYANOCOBALAMIN) 1000 MCG tablet Take 1,000 mcg by mouth daily      aspirin 81 MG chewable tablet Take 1 tablet by mouth daily 30 tablet 3    pantoprazole (PROTONIX) 40 MG tablet Take 1 tablet by mouth every morning (before breakfast) 30 tablet 3    tamsulosin (FLOMAX) 0.4 MG capsule Take 0.8 mg by mouth daily       atorvastatin (LIPITOR) 80 MG tablet Take 80 mg by mouth nightly. Current Meds  vancomycin (VANCOCIN) 1,500 mg in dextrose 5 % 250 mL IVPB, Once  piperacillin-tazobactam (ZOSYN) 3,375 mg in dextrose 5 % 50 mL IVPB (mini-bag), Once  morphine injection 4 mg, Once        Family History:   Family History   Problem Relation Age of Onset    Heart Disease Father        Social History:   TOBACCO:   reports that he quit smoking about 52 years ago. He has never used smokeless tobacco.  ETOH:   reports no history of alcohol use. DRUGS:   reports no history of drug use. REVIEW OF SYSTEMS:    As above.     PHYSICAL EXAM:      Vitals:    BP (!) 113/53   Pulse 67   Temp 99 °F (37.2 °C) (Oral)   Resp 15   Ht 6' (1.829 m)   Wt 225 lb (102.1 kg)   SpO2 94%   BMI 30.52 kg/m²     LABS:   Recent Labs     08/30/22  1446   WBC 9.6   HGB 11.5*   HCT 35.2*        Recent Labs     08/30/22  1446      K 4.4      CO2 26   BUN 36*   CREATININE 1.2     No results for input(s): PROT, INR, APTT in the last 72 hours. Vascular:  DP and PT pulses non-palpable B/L but upon doppler examination were found to be biphasic B/L. CFT is brisk to the digits of the foot b/l. Skin temperature is warm to cool from proximal to distal with no focal increase in temperature noted. Erythema noted to the dorsal aspect of the right foot and to right anterior lower leg. Mild erythema noted to the anterior left lower leg just distal to the tibial tuberosity. Mild non-pitting edema noted to B/L LE. No pain with calf compression b/l. NEUROLOGIC: Gross and epicritic sensation is intact b/l. Protective sensation is diminished at all pedal sites b/l. DERMATOLOGIC: Diffuse dermatologic changes noted to b/l LE. Verbal consent was obtained prior to taking photos today:    Right lower extremity:                    Hematoma noted to the lateral aspect of the right lower extremity that is approximately 4cm x 5 cm. The lesion is fluctuant but without crepitus,  purulence, or malodor. Several small abrasions surrounding the dorsal aspect of the hematoma that are noted to be superficial without crepitus, fluctuance, purulence or malodor. They do not tunnel, track or probe to bone. Full-thickness ulceration noted to the lateral aspect of the right 5th metatarsal head. The ulceration measures 3 cm x 3 cm x at least 0.4 cm. There is no hyperkeratotic tissue present surround the ulceration. The wound base is fibrotic and necrotic with a portion that does probe to bone. There is no fluctuance or crepitus noted. No purulent drainage present. The wound does not tunnel or track, but difficult to assess due to tenderness with exploration.      Large application (DOS 95/76/5017)  - Superficial abrasions, bilateral lower extremity  - Peripheral Neuropathy, bilateral lower extremit    -Patient examined and evaluated at the bedside   - Patient hypotensive otherwise VSS. No Leukocytosis noted 9.6  -ESR 5.6 CRP 25.4  -prealbumin ordered  -X-rays reviewed and impressions noted above  - Bulla plantar aspect right foot lanced, without signs of purulence or sanguinous drainage.   - Wound culture obtained from the ulceration of the lateral 5th metatarsal right foot. -Dressing applied to the right lower extremity consisting of wet to dry to the ulceration, adaptic, gauze, Kerlix and ACE. Dressing applied to the left lower extremity consisting of gauze, kerlix, ace.   -Antibiotics started from broad spectrum coverage. IV vancomycin and zosyn.  -Weightbearing status: patient non-ambulatory at baseline  - Keep dressing clean, dry, and intact  - Prevalon boots ordered. Patient to wear at all times to prevent further soft tissue breakdown. DISPO: Cellulits RLE. Ulceration RLE, Hematoma RLE. Labs and imaging reviewed. Broad spectrum IV antibiotics ordered. IV vancomycin and zosyn. Wound culture obtained; will follow up results. Patient would benefit from IV antibiotics and recommend admission at this time for careful observation. Local wound care will be performed while patient is in house. Due to elevated inflammatory markers and depth of ulceration there is concern for underlying OM. MRI ordered to further determine the need for surgical intervention.    - The patient will be staffed with Dr. Liseth Meier DPM   Podiatric Resident PGY1  Pager [phone] or [employer]  8/30/2022, 6:11 PM     Patient was seen and evaluated at bedside. Agree with residents assessment and treatment plan.   Meena Castillo DPM

## 2022-08-30 NOTE — PROGRESS NOTES
**Meds have been ordered**    Pharmacy  Note  - Admission Medication History    List of [id number] medications is complete. I reviewed Rx med list from [facility]. The following changes made to [id number] medication list:    ADDED:   1) acetaminophen     Dose or Frequency CHANGE:  1) trazodone 25 mg QHS--> 100 mg QHS      Current Outpatient Medications   Medication Instructions    acetaminophen (TYLENOL) 650 mg, Oral, EVERY 6 HOURS PRN    albuterol sulfate HFA (PROVENTIL HFA) 108 (90 Base) MCG/ACT inhaler 2 puffs, Inhalation, EVERY 6 HOURS PRN    aspirin 81 mg, Oral, DAILY    atorvastatin (LIPITOR) 80 mg, Oral, NIGHTLY    Balsam [place]-Castor Oil (VENELEX) OINT ointment Topical, DAILY PRN    dicyclomine (BENTYL) 20 mg, Oral, 3 TIMES DAILY PRN    docusate sodium (COLACE) 100 mg, Oral, DAILY BEFORE BREAKFAST    ferrous sulfate (IRON 325) 325 mg, Oral, 2 times daily    furosemide (LASIX) 40 mg, Oral, DAILY    gabapentin (NEURONTIN) 600 mg, Oral, 2 TIMES DAILY    guaiFENesin (MUCINEX) 600 mg, Oral, 2 TIMES DAILY PRN    levothyroxine (SYNTHROID) 75 mcg, Oral, DAILY    loperamide (IMODIUM) 2 mg, Oral, 4 TIMES DAILY PRN    Melatonin 5 MG CAPS 1 capsule, Oral, NIGHTLY    miconazole (MICOTIN) 2 % cream Apply topically 2 times daily. Nutritional Supplements (ENSURE HIGH PROTEIN) LIQD 1 Can, Oral, 2 TIMES DAILY    Nutritional Supplements (RONEY PO) 1 packet, Oral, 2 TIMES DAILY    pantoprazole (PROTONIX) 40 mg, Oral, DAILY BEFORE BREAKFAST    tamsulosin (FLOMAX) 0.8 mg, Oral, DAILY    traZODone (DESYREL) 100 mg, Oral, NIGHTLY    vitamin B-12 (CYANOCOBALAMIN) 1,000 mcg, Oral, DAILY    Wound Dressings (MEPILEX EX) Apply externally, Cleanse skin graft areas with normal saline, pat dry, apply mepilex foam board, change every 3 days as needed.          8/30/2022 6:43 PM  Blanquita Stubbs  PharmD Candidate   Class of 2023

## 2022-08-30 NOTE — ED PROVIDER NOTES
[address] [facility] ENCOUNTER          ATTENDING PHYSICIAN NOTE       Date of evaluation: 8/30/2022    ADDENDUM:      Care of this patient was assumed from Dr. Brady Castellon. The patient was seen for Fever (Known MRSA r/t wounds to BLE. Woke up feeling warm reports having T of 102 at facility this am. )  . The patient's initial evaluation and plan have been discussed with the prior provider who initially evaluated the patient. Nursing Notes, Past Medical Hx, Past Surgical Hx, Social Hx, Allergies, and Family Hx were all reviewed. Diagnostic Results     RADIOLOGY:  XR FOOT RIGHT (MIN 3 VIEWS)   Final Result   1. Suspected fracture of the second metatarsal shaft, incompletely assessed in this study. 2. Diffuse osteopenia in the tibia and fibula as well as the foot. Soft tissue swelling in the foot. 3. Incomplete assessment of the digits. XR TIBIA FIBULA RIGHT (2 VIEWS)   Final Result   1. Suspected fracture of the second metatarsal shaft, incompletely assessed in this study. 2. Diffuse osteopenia in the tibia and fibula as well as the foot. Soft tissue swelling in the foot. 3. Incomplete assessment of the digits.           LABS:   Results for orders placed or performed during the hospital encounter of 08/30/22   CBC with Auto Differential   Result Value Ref Range    WBC 9.6 4.0 - 11.0 K/uL    RBC 3.71 (L) 4.20 - 5.90 M/uL    Hemoglobin 11.5 (L) 13.5 - 17.5 g/dL    Hematocrit 35.2 (L) 40.5 - 52.5 %    MCV 94.9 80.0 - 100.0 fL    MCH 31.0 26.0 - 34.0 pg    MCHC 32.6 31.0 - 36.0 g/dL    RDW 15.9 (H) 12.4 - 15.4 %    Platelets 667 694 - 549 K/uL    MPV 9.4 5.0 - 10.5 fL    Neutrophils % 62.9 %    Lymphocytes % 17.5 %    Monocytes % 6.0 %    Eosinophils % 12.7 %    Basophils % 0.9 %    Neutrophils Absolute 6.1 1.7 - 7.7 K/uL    Lymphocytes Absolute 1.7 1.0 - 5.1 K/uL    Monocytes Absolute 0.6 0.0 - 1.3 K/uL    Eosinophils Absolute 1.2 (H) 0.0 - 0.6 K/uL    Basophils Absolute 0.1 0.0 - 0.2 K/uL   BMP w/ Reflex to MG   Result Value Ref Range    Sodium 140 136 - 145 mmol/L    Potassium reflex Magnesium 4.4 3.5 - 5.1 mmol/L    Chloride 102 99 - 110 mmol/L    CO2 26 21 - 32 mmol/L    Anion Gap 12 3 - 16    Glucose 106 (H) 70 - 99 mg/dL    BUN 36 (H) 7 - 20 mg/dL    Creatinine 1.2 0.8 - 1.3 mg/dL    GFR Non-African American 58 (A) >60    GFR African American >60 >60    Calcium 8.3 8.3 - 10.6 mg/dL   Sedimentation Rate   Result Value Ref Range    Sed Rate 56 (H) 0 - 20 mm/Hr   C-Reactive Protein   Result Value Ref Range    CRP 25.4 (H) 0.0 - 5.1 mg/L       RECENT VITALS:  BP: (!) 114/59, Temp: 99 °F (37.2 °C), Heart Rate: 79, Resp: 16,       Procedures         ED Course     The patient was given the following medications:  No orders of the defined types were placed in this encounter. CONSULTS:  [id number] [place] / ASSESSMENT / PLAN     Giovanna Manning is a 81 y.o. male who presents to the emergency room today with concern for fever and a diabetic foot infection. Patient is signed out to my care awaiting results of lab test.  Patient's inflammatory markers are elevated otherwise labs are unremarkable. Podiatry was consulted and came to evaluate the patient obtained a wound culture and request to treat him with IV antibiotics and admit him for further work-up and management. I discussed the patient's case with the hospitalist and he remained stable for duration of my care. .    Clinical Impression     1. Diabetic foot infection ([address]    2. Wound infection        Disposition     PATIENT REFERRED TO:  No follow-up provider specified.     DISCHARGE MEDICATIONS:  New Prescriptions    No medications on file       DISPOSITION Decision To Admit 08/30/2022 03:37:50 PM         Jasen Leung MD  08/31/22 [id number]

## 2022-08-30 NOTE — ED PROVIDER NOTES
1 [facility]  EMERGENCY DEPARTMENT ENCOUNTER          ATTENDING PHYSICIAN NOTE       Date of evaluation: 8/30/2022    Chief Complaint     Fever (Known MRSA r/t wounds to BLE. Woke up feeling warm reports having T of 102 at facility this am. )      History of Present Illness     Gerri Bernal is a 81 y.o. male CHF, cellulitis, MRSA, C. difficile colitis, MDRO who presents with infection of the right lower extremity. According to the patient his nurse was concerned about increasing drainage and redness surrounding his right foot. He had a fever of 102 at the facility. He states that he has chronic wounds and gets very stressed away from his lower back femur and ankle. Patient denies any chest pain or shortness of breath. He denies any nausea vomiting abdominal pain. He states that usually gets his dressings done every other day. He is nonambulatory and is wheelchair and bedbound. Review of Systems     Review of Systems   HENT:  Negative for congestion and ear discharge. Eyes:  Negative for photophobia and visual disturbance. Respiratory:  Negative for apnea. Cardiovascular:  Negative for chest pain. Gastrointestinal:  Negative for abdominal pain, blood in stool, nausea, rectal pain and vomiting. Endocrine: Negative for polydipsia. Genitourinary:  Negative for difficulty urinating. Musculoskeletal:  Negative for back pain. Skin:  Positive for wound. Negative for pallor. Neurological:  Negative for dizziness and syncope. Hematological:  Negative for adenopathy. All other systems reviewed and are negative.     Past Medical, Surgical, Family, and Social History     He has a past medical history of BPH (benign prostatic hyperplasia), C. difficile colitis, Carotid stenosis, Cellulitis, CHF (congestive heart failure) ([address] Chronic back pain, Diverticular disease, Encounter for imaging to screen for metal prior to MRI, GERD (gastroesophageal reflux disease), History of atrial fibrillation, Hypercholesteremia, Hypertension, Lower GI bleed, MDRO (multiple drug resistant organisms) resistance, Neuromuscular disorder ([facility] [address] Renal insufficiency, Septic arthritis of interphalangeal joint of toe of right foot ([facility] [address] and Vitamin B12 deficiency. He has a past surgical history that includes back surgery (2006); Foot surgery; Coronary artery bypass graft (12/13/2013); hip surgery (Right, 5/1/2015); Cystoscopy (N/A, 1/10/2019); Upper gastrointestinal endoscopy (N/A, 5/4/2020); sigmoidoscopy (N/A, 6/11/2020); Leg Surgery (Right, 11/2/2021); and Foot Debridement (Left, 5/29/2022). His family history includes Heart Disease in his father. He reports that he quit smoking about 52 years ago. He has never used smokeless tobacco. He reports that he does not drink alcohol and does not use drugs. Medications     Previous Medications    ALBUTEROL SULFATE HFA (PROVENTIL HFA) 108 (90 BASE) MCG/ACT INHALER    Inhale 2 puffs into the lungs every 6 hours as needed for Wheezing or Shortness of Breath    ASPIRIN 81 MG CHEWABLE TABLET    Take 1 tablet by mouth daily    ATORVASTATIN (LIPITOR) 80 MG TABLET    Take 80 mg by mouth nightly. BALSAM PERU-CASTOR OIL (VENELEX) OINT OINTMENT    Apply topically daily as needed    DICYCLOMINE (BENTYL) 20 MG TABLET    Take 20 mg by mouth 3 times daily as needed    DOCUSATE SODIUM (COLACE) 100 MG CAPSULE    Take 100 mg by mouth daily     FERROUS SULFATE (IRON 325) 325 (65 FE) MG TABLET    Take 1 tablet by mouth 2 times daily    FUROSEMIDE (LASIX) 40 MG TABLET    Take 1 tablet by mouth daily    GABAPENTIN (NEURONTIN) 600 MG TABLET    Take 600 mg by mouth 2 times daily.     GUAIFENESIN (MUCINEX) 600 MG EXTENDED RELEASE TABLET    Take 1 tablet by mouth 2 times daily as needed for Congestion    LEVOTHYROXINE (SYNTHROID) 75 MCG TABLET    Take 1 tablet by mouth Daily    LOPERAMIDE (IMODIUM) 2 MG CAPSULE    Take 2 mg by mouth 4 times daily as needed for Diarrhea    MELATONIN 5 MG CAPS    Take 1 capsule by mouth nightly    MICONAZOLE (MICOTIN) 2 % CREAM    Apply topically 2 times daily. MICONAZOLE (MICOTIN) 2 % POWDER    Apply topically 2 times daily. NUTRITIONAL SUPPLEMENTS (ENSURE HIGH PROTEIN) LIQD    Take 1 Can by mouth 2 times daily    NUTRITIONAL SUPPLEMENTS (RONEY PO)    Take 1 packet by mouth 2 times daily    PANTOPRAZOLE (PROTONIX) 40 MG TABLET    Take 1 tablet by mouth every morning (before breakfast)    TAMSULOSIN (FLOMAX) 0.4 MG CAPSULE    Take 0.8 mg by mouth daily     TRAZODONE (DESYREL) 50 MG TABLET    Take 25 mg by mouth nightly    VITAMIN B-12 (CYANOCOBALAMIN) 1000 MCG TABLET    Take 1,000 mcg by mouth daily    WOUND DRESSINGS (MEPILEX EX)    Apply topically Cleanse skin graft areas with normal saline, pat dry, apply mepilex foam board, change every 3 days as needed. Allergies     He is allergic to bactrim [sulfamethoxazole-trimethoprim]. Physical Exam     INITIAL VITALS: BP: (!) 114/59, Temp: 99 °F (37.2 °C), Heart Rate: 79, Resp: 16,     Physical Exam  Vitals reviewed. Constitutional:       Appearance: He is well-developed. He is obese. He is not ill-appearing or diaphoretic. HENT:      Head: Normocephalic and atraumatic. Eyes:      Pupils: Pupils are equal, round, and reactive to light. Cardiovascular:      Rate and Rhythm: Normal rate and regular rhythm. Heart sounds: Normal heart sounds. Pulmonary:      Effort: Pulmonary effort is normal.      Breath sounds: Normal breath sounds. Abdominal:      General: Bowel sounds are normal. There is no distension. Palpations: Abdomen is soft. Tenderness: There is no abdominal tenderness. Musculoskeletal:         General: No tenderness. Normal range of motion. Cervical back: Neck supple. Skin:     General: Skin is warm and dry. Capillary Refill: Capillary refill takes less than 2 seconds. Findings: Bruising, erythema and lesion present.       Comments: Wounds noted to the right lower extremity. There is an area of necrosis noted to the lateral aspect of the right foot along the head of the fifth metatarsal wound to the bottom of the foot as well. Neurological:      General: No focal deficit present. Mental Status: He is alert and oriented to person, place, and time. Diagnostic Results         RADIOLOGY:  XR FOOT RIGHT (MIN 3 VIEWS)   Final Result   1. Suspected fracture of the second metatarsal shaft, incompletely assessed in this study. 2. Diffuse osteopenia in the tibia and fibula as well as the foot. Soft tissue swelling in the foot. 3. Incomplete assessment of the digits. XR TIBIA FIBULA RIGHT (2 VIEWS)   Final Result   1. Suspected fracture of the second metatarsal shaft, incompletely assessed in this study. 2. Diffuse osteopenia in the tibia and fibula as well as the foot. Soft tissue swelling in the foot. 3. Incomplete assessment of the digits.           LABS:   Results for orders placed or performed during the hospital encounter of 08/30/22   CBC with Auto Differential   Result Value Ref Range    WBC 9.6 4.0 - 11.0 K/uL    RBC 3.71 (L) 4.20 - 5.90 M/uL    Hemoglobin 11.5 (L) 13.5 - 17.5 g/dL    Hematocrit 35.2 (L) 40.5 - 52.5 %    MCV 94.9 80.0 - 100.0 fL    MCH 31.0 26.0 - 34.0 pg    MCHC 32.6 31.0 - 36.0 g/dL    RDW 15.9 (H) 12.4 - 15.4 %    Platelets 496 666 - 281 K/uL    MPV 9.4 5.0 - 10.5 fL    Neutrophils % 62.9 %    Lymphocytes % 17.5 %    Monocytes % 6.0 %    Eosinophils % 12.7 %    Basophils % 0.9 %    Neutrophils Absolute 6.1 1.7 - 7.7 K/uL    Lymphocytes Absolute 1.7 1.0 - 5.1 K/uL    Monocytes Absolute 0.6 0.0 - 1.3 K/uL    Eosinophils Absolute 1.2 (H) 0.0 - 0.6 K/uL    Basophils Absolute 0.1 0.0 - 0.2 K/uL   BMP w/ Reflex to MG   Result Value Ref Range    Sodium 140 136 - 145 mmol/L    Potassium reflex Magnesium 4.4 3.5 - 5.1 mmol/L    Chloride 102 99 - 110 mmol/L    CO2 26 21 - 32 mmol/L    Anion Gap 12 3 - 16    Glucose 106 (H) 70 - 99 mg/dL    BUN 36 (H) 7 - 20 mg/dL    Creatinine 1.2 0.8 - 1.3 mg/dL    GFR Non-African American 58 (A) >60    GFR African American >60 >60    Calcium 8.3 8.3 - 10.6 mg/dL   C-Reactive Protein   Result Value Ref Range    CRP 25.4 (H) 0.0 - 5.1 mg/L       ED BEDSIDE ULTRASOUND:  No results found. RECENT VITALS:  BP: (!) 114/59,Temp: 99 °F (37.2 °C), Heart Rate: 79, Resp: 16,       Procedures         ED Course     Nursing Notes, Past Medical Hx, Past Surgical Hx, Social Hx,Allergies, and Family Hx were reviewed. patient was given the following medications:  No orders of the defined types were placed in this encounter. CONSULTS:  None    MEDICAL DECISIONMAKING / ASSESSMENT / PLAN     Javy Kapoor is a 81 y.o. male history of MDRO, MRSA presenting with an infection to his right foot that is concerning for the nurse who takes care of him. Patient this time upon arrival is afebrile but was noted to have a fever prior to arrival.  X-rays obtained to evaluate for osteomyelitis at this time otherwise unremarkable this but a questionable fracture of the second metatarsal was noted. He also had blood work obtained as well. The patient's care will be signed out to my colleague Dr. Anupama Alexander will follow up on the patient's blood work, reassessment and disposition. Clinical Impression     1. Wound infection        Disposition     PATIENT REFERRED TO:  No follow-up provider specified.     DISCHARGE MEDICATIONS:  New Prescriptions    No medications on file       DISPOSITION           Tiffanie Rosario MD  08/30/22 1654

## 2022-08-30 NOTE — H&P
Internal Medicine  PGY 3  History & Physical       CC leg pain     History Obtained From:  patient     HISTORY OF PRESENT ILLNESS:  71-year-old man with a past medical history of GERD, congestive heart failure, neurogenic bladder with chronic Iglesias, UTI, history of lower extremity venous wounds presented with right lower extremity pain and swelling. The patient was getting wound care at his nursing home ([address]) when his nurse told him that his foot looks infected and he needs to go to the ED as his wound looked infected. The patient also reports that he had a temperature at his nursing home with a T-max being 101.2. In the ED the patient had normal vitals, grossly normal CBC and BMP. His CRP was elevated at 25 and his ESR was elevated at 56. X-ray of the right lower extremity showed soft tissue swelling but no cortical bone loss or gas. The patient reports that he does recall pain in the right lower leg starting 1 to 2 days ago but beyond that is unable to provide much of a history. He denies chest pain, shortness of breath, abdominal pain, headache, sensory loss, motor weakness.      Past Medical History:    Past Medical History             Diagnosis Date    BPH (benign prostatic hyperplasia)      C. difficile colitis 04/11/2022    Carotid stenosis 12/2013     JESU 19-27% stenosis; LICA 40-25% stenosis    Cellulitis 12/2013     LLE    CHF (congestive heart failure) ([facility])      Chronic back pain      Diverticular disease      Encounter for imaging to screen for metal prior to MRI 05/26/2022     [employer]: Synchromed II pump for baclofen -lfe    GERD (gastroesophageal reflux disease)      History of atrial fibrillation      Hypercholesteremia      Hypertension      Lower GI bleed      MDRO (multiple drug resistant organisms) resistance 10/26/2019     urine    Neuromuscular disorder ([facility])       spasticity    Renal insufficiency      Septic arthritis of interphalangeal joint of toe of right foot ([facility] [address] 5/27/2022    Vitamin B12 deficiency              Past Surgical History:    Past Surgical History             Procedure Laterality Date    BACK SURGERY   2006     lower lumbar    CORONARY ARTERY BYPASS GRAFT   12/13/2013     CABG x 5 (Dr Estuardo Heck), svg to diag, om1 and 3, distal rca, kelly to lad. CYSTOSCOPY N/A 1/10/2019     CYSTOSCOPY performed by Kelsy Durant MD at [address] Left 5/29/2022     LEFT FOOT PARTIAL FIFTH RAY RESECTION WITH EXCISIONAL DEBRIDEMENT OF MUSCLE AND FASCIA, WITH APPLICATION OF GRAFT performed by Donavon Taveras DPM at [place] [address] Right 5/1/2015     ORIF    LEG SURGERY Right 11/2/2021     RIGHT LOWER EXTREMITY ADVANCEMENT FLAP AND SPLIT THICKNESS SKIN GRAFT PLACEMENT; (WOUND- 10 CM X 5.5 CM; CLOSURE- 6 CM X 5.5 CM; SKIN GRAFT- 7.2 CM X 5 CM) performed by Palak Herndon MD at [place] [address] N/A 6/11/2020     [facility] performed by Noah Martin MD at [facility] 5/4/2020     EGD BIOPSY performed by Noah Martin MD at [address] [place] [address] Admission:      Prescriptions Prior to Admission   Not in a hospital admission. Allergies:  Bactrim [sulfamethoxazole-trimethoprim]     Social History:   TOBACCO:   reports that he quit smoking about 52 years ago. He has never used smokeless tobacco.  ETOH:   reports no history of alcohol use. Patient currently lives in a nursing home     Family History:   Family History             Problem Relation Age of Onset    Heart Disease Father              Review of Systems   Constitutional:  Negative for activity change and appetite change. HENT:  Negative for dental problem, drooling, ear discharge, sinus pressure, sore throat and tinnitus. Respiratory:  Negative for apnea, cough and shortness of breath. Cardiovascular:  Negative for chest pain and leg swelling. Gastrointestinal:  Negative for abdominal distention. Genitourinary:  Negative for difficulty urinating, dysuria, frequency, genital sores, hematuria and penile pain. Skin:  Negative for color change. Neurological:  Negative for speech difficulty, numbness and headaches. Psychiatric/Behavioral:  Negative for agitation, behavioral problems, confusion, self-injury and suicidal ideas. Physical Exam  Constitutional:       Appearance: Normal appearance. HENT:      Head: Normocephalic and atraumatic. Nose: Nose normal.      Mouth/Throat:      Mouth: Mucous membranes are moist.   Eyes:      Extraocular Movements: Extraocular movements intact. Pupils: Pupils are equal, round, and reactive to light. Cardiovascular:      Rate and Rhythm: Normal rate and regular rhythm. Heart sounds:     No gallop. Pulmonary:      Effort: No respiratory distress. Breath sounds: No wheezing or rales. Abdominal:      General: There is no distension. Palpations: There is no mass. Tenderness: There is no abdominal tenderness. There is no rebound. Hernia: No hernia is present. Musculoskeletal:         General: Tenderness present. No swelling. Right lower leg: Edema present. Left lower leg: Edema present. Comments: See picture below of Rt LE   Skin:     General: Skin is warm. Capillary Refill: Capillary refill takes less than 2 seconds. Neurological:      Mental Status: He is alert and oriented to person, place, and time. Cranial Nerves: No cranial nerve deficit. Sensory: No sensory deficit. Motor: No weakness. Gait: Gait normal.   Psychiatric:         Mood and Affect: Mood normal.         Behavior: Behavior normal.      Media Information  Document Information     Clinical Consent:  Wound       08/30/2022 17:34   Attached To:    Hospital Encounter on 8/30/22      Source Information     Phil Gomes MD  [address] [facility] Vitals:     08/30/22 1700   BP: (!) 113/53   Pulse: 67   Resp: 15   Temp:     SpO2: 94%         DATA:     Labs:  CBC:       Recent Labs     08/30/22  1446   WBC 9.6   HGB 11.5*   HCT 35.2*          BMP:       Recent Labs     08/30/22  1446      K 4.4      CO2 26   BUN 36*   CREATININE 1.2   GLUCOSE 106*      LFT's: No results for input(s): AST, ALT, ALB, BILITOT, ALKPHOS in the last 72 hours. Troponin: No results for input(s): TROPONINI in the last 72 hours. BNP:No results for input(s): BNP in the last 72 hours. ABGs: No results for input(s): PHART, [id number], PO2ART in the last 72 hours. INR: No results for input(s): INR in the last 72 hours. U/A:No results for input(s): NITRITE, COLORU, PHUR, LABCAST, WBCUA, RBCUA, MUCUS, TRICHOMONAS, YEAST, BACTERIA, CLARITYU, SPECGRAV, LEUKOCYTESUR, UROBILINOGEN, BILIRUBINUR, BLOODU, GLUCOSEU, AMORPHOUS in the last 72 hours. Invalid input(s): KETONESU     XR FOOT RIGHT (MIN 3 VIEWS)   Final Result   1. Suspected fracture of the second metatarsal shaft, incompletely assessed in this study. 2. Diffuse osteopenia in the tibia and fibula as well as the foot. Soft tissue swelling in the foot. 3. Incomplete assessment of the digits. XR TIBIA FIBULA RIGHT (2 VIEWS)   Final Result   1. Suspected fracture of the second metatarsal shaft, incompletely assessed in this study. 2. Diffuse osteopenia in the tibia and fibula as well as the foot. Soft tissue swelling in the foot. 3. Incomplete assessment of the digits.                    ASSESSMENT AND PLAN:     Cellulitis of right lower extremity  - The wound looks infected and he has a history of MRSA growing from his wound on a prior admission  - We will treat with vancomycin  - Blood cultures  - Trend CRP and Pro-Calc  - Podiatry already consulted from the ED        CAD s/p CABG in 2013   -continue home aspirin 81 mg daily   -continue lipitor 80 mg nightly      Hypothyroidism   -continue home synthyroid 75 mcg      BPH  -continue home flomax 0.8 mg PO daily   -chronic indwelling foleys catheter      Mood disorders   -continue home trazodone 25 mg PO nightly      Hx of Intertrigo likely 2/2 fungal rash  - Cont diflucan  - Miconazole powder to keep area dry        Will discuss with attending physician Dr. Christine Mendoza Status:Full code  FEN: Adult  PPX: SQH  DISPO: Jonnie Waller MD PGY-3  8/30/2022,  5:58 PM

## 2022-08-31 ENCOUNTER — APPOINTMENT (OUTPATIENT)
Dept: MRI IMAGING | Age: 82
DRG: 475 | End: 2022-08-31
Payer: MEDICARE

## 2022-08-31 LAB
ALBUMIN SERPL-MCNC: 3.1 G/DL (ref 3.4–5)
ANION GAP SERPL CALCULATED.3IONS-SCNC: 10 MMOL/L (ref 3–16)
BASOPHILS ABSOLUTE: 0.1 K/UL (ref 0–0.2)
BASOPHILS RELATIVE PERCENT: 0.9 %
BUN BLDV-MCNC: 38 MG/DL (ref 7–20)
C-REACTIVE PROTEIN: 31.6 MG/L (ref 0–5.1)
CALCIUM SERPL-MCNC: 8.3 MG/DL (ref 8.3–10.6)
CHLORIDE BLD-SCNC: 100 MMOL/L (ref 99–110)
CO2: 26 MMOL/L (ref 21–32)
CREAT SERPL-MCNC: 1 MG/DL (ref 0.8–1.3)
EOSINOPHILS ABSOLUTE: 1 K/UL (ref 0–0.6)
EOSINOPHILS RELATIVE PERCENT: 10.8 %
GFR AFRICAN AMERICAN: >60
GFR NON-AFRICAN AMERICAN: >60
GLUCOSE BLD-MCNC: 104 MG/DL (ref 70–99)
HCT VFR BLD CALC: 35.2 % (ref 40.5–52.5)
HEMOGLOBIN: 11.5 G/DL (ref 13.5–17.5)
LYMPHOCYTES ABSOLUTE: 1.2 K/UL (ref 1–5.1)
LYMPHOCYTES RELATIVE PERCENT: 12.6 %
MAGNESIUM: 2.1 MG/DL (ref 1.8–2.4)
MCH RBC QN AUTO: 30.9 PG (ref 26–34)
MCHC RBC AUTO-ENTMCNC: 32.8 G/DL (ref 31–36)
MCV RBC AUTO: 94 FL (ref 80–100)
MONOCYTES ABSOLUTE: 0.6 K/UL (ref 0–1.3)
MONOCYTES RELATIVE PERCENT: 5.8 %
NEUTROPHILS ABSOLUTE: 6.7 K/UL (ref 1.7–7.7)
NEUTROPHILS RELATIVE PERCENT: 69.9 %
PDW BLD-RTO: 15.7 % (ref 12.4–15.4)
PHOSPHORUS: 3.7 MG/DL (ref 2.5–4.9)
PLATELET # BLD: 178 K/UL (ref 135–450)
PMV BLD AUTO: 9.1 FL (ref 5–10.5)
POTASSIUM SERPL-SCNC: 3.9 MMOL/L (ref 3.5–5.1)
PREALBUMIN: 17.5 MG/DL (ref 20–40)
PROCALCITONIN: 0.08 NG/ML (ref 0–0.15)
RBC # BLD: 3.74 M/UL (ref 4.2–5.9)
SODIUM BLD-SCNC: 136 MMOL/L (ref 136–145)
WBC # BLD: 9.5 K/UL (ref 4–11)

## 2022-08-31 PROCEDURE — 6360000002 HC RX W HCPCS: Performed by: INTERNAL MEDICINE

## 2022-08-31 PROCEDURE — 80069 RENAL FUNCTION PANEL: CPT

## 2022-08-31 PROCEDURE — 6370000000 HC RX 637 (ALT 250 FOR IP): Performed by: STUDENT IN AN ORGANIZED HEALTH CARE EDUCATION/TRAINING PROGRAM

## 2022-08-31 PROCEDURE — 6370000000 HC RX 637 (ALT 250 FOR IP): Performed by: INTERNAL MEDICINE

## 2022-08-31 PROCEDURE — 2580000003 HC RX 258: Performed by: INTERNAL MEDICINE

## 2022-08-31 PROCEDURE — 85025 COMPLETE CBC W/AUTO DIFF WBC: CPT

## 2022-08-31 PROCEDURE — 6360000002 HC RX W HCPCS: Performed by: STUDENT IN AN ORGANIZED HEALTH CARE EDUCATION/TRAINING PROGRAM

## 2022-08-31 PROCEDURE — 86140 C-REACTIVE PROTEIN: CPT

## 2022-08-31 PROCEDURE — 94761 N-INVAS EAR/PLS OXIMETRY MLT: CPT

## 2022-08-31 PROCEDURE — 84145 PROCALCITONIN (PCT): CPT

## 2022-08-31 PROCEDURE — 2580000003 HC RX 258: Performed by: STUDENT IN AN ORGANIZED HEALTH CARE EDUCATION/TRAINING PROGRAM

## 2022-08-31 PROCEDURE — 99222 1ST HOSP IP/OBS MODERATE 55: CPT | Performed by: INTERNAL MEDICINE

## 2022-08-31 PROCEDURE — 1200000000 HC SEMI PRIVATE

## 2022-08-31 PROCEDURE — 36415 COLL VENOUS BLD VENIPUNCTURE: CPT

## 2022-08-31 PROCEDURE — 83735 ASSAY OF MAGNESIUM: CPT

## 2022-08-31 PROCEDURE — 6370000000 HC RX 637 (ALT 250 FOR IP)

## 2022-08-31 PROCEDURE — 73718 MRI LOWER EXTREMITY W/O DYE: CPT

## 2022-08-31 RX ORDER — TRAZODONE HYDROCHLORIDE 100 MG/1
100 TABLET ORAL NIGHTLY
Status: DISCONTINUED | OUTPATIENT
Start: 2022-08-31 | End: 2022-09-07 | Stop reason: HOSPADM

## 2022-08-31 RX ORDER — TRAZODONE HYDROCHLORIDE 100 MG/1
100 TABLET ORAL NIGHTLY
Status: DISCONTINUED | OUTPATIENT
Start: 2022-09-01 | End: 2022-08-31

## 2022-08-31 RX ORDER — LINEZOLID 600 MG/1
600 TABLET, FILM COATED ORAL EVERY 12 HOURS SCHEDULED
Status: DISCONTINUED | OUTPATIENT
Start: 2022-08-31 | End: 2022-09-07 | Stop reason: HOSPADM

## 2022-08-31 RX ADMIN — HEPARIN SODIUM 5000 UNITS: 5000 INJECTION INTRAVENOUS; SUBCUTANEOUS at 21:07

## 2022-08-31 RX ADMIN — VANCOMYCIN HYDROCHLORIDE 750 MG: 10 INJECTION, POWDER, LYOPHILIZED, FOR SOLUTION INTRAVENOUS at 17:04

## 2022-08-31 RX ADMIN — GABAPENTIN 600 MG: 600 TABLET, FILM COATED ORAL at 09:08

## 2022-08-31 RX ADMIN — LEVOTHYROXINE SODIUM 75 MCG: 0.07 TABLET ORAL at 05:43

## 2022-08-31 RX ADMIN — Medication 5 MG: at 01:32

## 2022-08-31 RX ADMIN — CEFTRIAXONE 2000 MG: 2 INJECTION, POWDER, FOR SOLUTION INTRAMUSCULAR; INTRAVENOUS at 23:57

## 2022-08-31 RX ADMIN — CYANOCOBALAMIN TAB 1000 MCG 1000 MCG: 1000 TAB at 09:08

## 2022-08-31 RX ADMIN — ATORVASTATIN CALCIUM 80 MG: 40 TABLET, FILM COATED ORAL at 21:09

## 2022-08-31 RX ADMIN — Medication 5 MG: at 21:07

## 2022-08-31 RX ADMIN — ACETAMINOPHEN 650 MG: 325 TABLET ORAL at 21:26

## 2022-08-31 RX ADMIN — FUROSEMIDE 40 MG: 40 TABLET ORAL at 09:08

## 2022-08-31 RX ADMIN — GABAPENTIN 600 MG: 600 TABLET, FILM COATED ORAL at 01:32

## 2022-08-31 RX ADMIN — ATORVASTATIN CALCIUM 80 MG: 40 TABLET, FILM COATED ORAL at 01:32

## 2022-08-31 RX ADMIN — GABAPENTIN 600 MG: 600 TABLET, FILM COATED ORAL at 21:08

## 2022-08-31 RX ADMIN — HEPARIN SODIUM 5000 UNITS: 5000 INJECTION INTRAVENOUS; SUBCUTANEOUS at 13:12

## 2022-08-31 RX ADMIN — SODIUM CHLORIDE, PRESERVATIVE FREE 10 ML: 5 INJECTION INTRAVENOUS at 09:09

## 2022-08-31 RX ADMIN — TAMSULOSIN HYDROCHLORIDE 0.8 MG: 0.4 CAPSULE ORAL at 09:08

## 2022-08-31 RX ADMIN — LINEZOLID 600 MG: 600 TABLET, FILM COATED ORAL at 21:08

## 2022-08-31 RX ADMIN — SODIUM CHLORIDE, PRESERVATIVE FREE 10 ML: 5 INJECTION INTRAVENOUS at 01:32

## 2022-08-31 RX ADMIN — HEPARIN SODIUM 5000 UNITS: 5000 INJECTION INTRAVENOUS; SUBCUTANEOUS at 01:32

## 2022-08-31 RX ADMIN — ASPIRIN 81 MG 81 MG: 81 TABLET ORAL at 09:08

## 2022-08-31 RX ADMIN — VANCOMYCIN HYDROCHLORIDE 750 MG: 10 INJECTION, POWDER, LYOPHILIZED, FOR SOLUTION INTRAVENOUS at 05:56

## 2022-08-31 RX ADMIN — CEFTRIAXONE 1000 MG: 1 INJECTION, POWDER, FOR SOLUTION INTRAMUSCULAR; INTRAVENOUS at 01:45

## 2022-08-31 RX ADMIN — TRAZODONE HYDROCHLORIDE 100 MG: 100 TABLET ORAL at 23:55

## 2022-08-31 RX ADMIN — TRAZODONE HYDROCHLORIDE 25 MG: 50 TABLET ORAL at 01:32

## 2022-08-31 RX ADMIN — HEPARIN SODIUM 5000 UNITS: 5000 INJECTION INTRAVENOUS; SUBCUTANEOUS at 05:43

## 2022-08-31 NOTE — PROGRESS NOTES
Physician Progress Note      Sabrina Rivas  CSN #:                  [id number]  :                       1940  ADMIT DATE:       2022 1:29 PM  [address] [place] [place] DATE:  RESPONDING  PROVIDER #:        Rolly Adlridge MD          QUERY TEXT:    Pt admitted with cellulitis. Pt noted to have hx of CHF. If possible, please   document in progress notes and discharge summary if you are evaluating and/or   treating any of the following: The medical record reflects the following:  Risk Factors: 80 yo w/ hx of CHF  Clinical Indicators: Per H&P: 44-year-old male with past history of CHF. ECHO   : EF 55-60%. No regional wall motion abnormalities are noted. Diastolic   filling parameters suggests indeterminate diastolic dysfunction  Treatment: 40 mg Lasix PO daily  Options provided:  -- Chronic Systolic CHF/HFrEF  -- Chronic Diastolic CHF/HFpEF  -- Chronic Systolic and Diastolic CHF  -- Other - I will add my own diagnosis  -- Disagree - Not applicable / Not valid  -- Disagree - Clinically unable to determine / Unknown  -- Refer to Clinical Documentation Reviewer    PROVIDER RESPONSE TEXT:    This patient has chronic systolic CHF/HFrEF. Query created by: Luigi Branham on 2022 8:29 AM      QUERY TEXT:    Patient admitted with right lower extremity cellulitis. Noted documentation of   diabetic foot ulcer in Podiatry consult note . In order to support the   diagnosis of diabetes, please include additional clinical indicators in your   documentation. Or please document if the diagnosis of diabetes has been ruled   out after further study. The medical record reflects the following:  Risk Factors: 80 yo  w/ cellulitis. No history of DM. Clinical Indicators: Per Podiatry:  diabetic foot infection right. Treatment: No accuchecks, No diabetic meds, No SSI  Options provided:  -- Diabetes was ruled out  -- Diabetes present as evidenced by, Please document evidence.   -- Other - I will add my own diagnosis  -- Disagree - Not applicable / Not valid  -- Disagree - Clinically unable to determine / Unknown  -- Refer to Clinical Documentation Reviewer    PROVIDER RESPONSE TEXT:    Diabetes was ruled out after study.     Query created by: Vale Zapien on 8/31/2022 8:36 AM      Electronically signed by:  Liberty Tao MD 8/31/2022 10:40 AM

## 2022-08-31 NOTE — CONSULTS
Clinical Pharmacy Progress Note    Vancomycin has been discontinued. Pharmacy will sign off. Please re-consult pharmacy if vancomycin dosing is wanted in the future. Please call with questions.   Alexander Grant, PharmD  Main Pharmacy: [id number]  8/31/2022 6:26 PM

## 2022-08-31 NOTE — PROGRESS NOTES
Clinical Pharmacy Progress Note    vancomycin - Management by Pharmacy    Consult Date(s): 8/30/2022  Consulting Provider(s): Srinivas    Assessment / Plan    SSTI (Cellulitis of R lower extremity)- Vancomycin  Concurrent Antimicrobials: Ceftriaxone   Day of Vanc Therapy / Ordered Duration: 1/5  Current Dosing Method: Bayesian-Guided AUC Dosing  Therapeutic Goal: 400-600 mg/L*hr  Current Dose / Frequency: 1500 mg once followed by vancomycin 750 mg IV q 12 hours  Plan / Rationale:   Expected AUC of 504 mg/L*hr with trough of 17.7mg/L  Will continue to monitor clinical condition and make adjustments to regimen as appropriate. Thank you for consulting Pharmacy! Marden [facility], PharmD, [address]  8/31/2022 2:13 AM       Subjective/Objective: Mr. Kady Mcbride is a 81 y.o. male with a PMHx significant for GERD, congestive heart failure, neurogenic bladder with chronic Iglesias, admitted for cellulitis of lower extremity. Pharmacy has been consulted to vancomycin dosing. Ht Readings from Last 1 Encounters:   08/31/22 6' (1.829 m)     Wt Readings from Last 1 Encounters:   08/31/22 221 lb 5.5 oz (100.4 kg)       Current & Prior Antimicrobial Regimen(s):  Ceftriaxone   Vancomycin     Level(s) / Doses:    Date Time Dose Level / Type of Level Interpretation                 Note: Serum levels collected for AUC-based dosing may be high if collected in close proximity to the dose administered. This is not necessarily indicative of toxicity. Cultures & Sensitivities:    Date Site Micro Susceptibility / Result                 Labs / Ancillary Data:    Estimated Creatinine Clearance: 59 mL/min (based on SCr of 1.2 mg/dL). Recent Labs     08/30/22  1446   CREATININE 1.2   BUN 36*   WBC 9.6       Additional Lab Values / Findings of Note:    Procalcitonin: No results for input(s): PROCAL in the last 72 hours.

## 2022-08-31 NOTE — CARE COORDINATION
CM following: CM called [facility] [phone] to follow up on patient's status as a resident, discuss discharge planning and to follow up on an appointment patient mentioned having with staff at [facility] today. CM left a VM and will await a call back.   Electronically signed by Sherren Caul, MSW on 8/31/2022 at 4:34 PM  [phone]

## 2022-08-31 NOTE — PLAN OF CARE
Problem: Skin/Tissue Integrity  Goal: Absence of new skin breakdown  Description: 1. Monitor for areas of redness and/or skin breakdown  2. Assess vascular access sites hourly  Outcome: Progressing  Initial assessment completed, no new areas of skin breakdown noted at this time. Pt turns self in bed upon prompting. Problem: Safety - Adult  Goal: Free from fall injury  Outcome: Progressing  Patient is a fall risk. Fall risk protocol in place as per fall risk score, see assessment for details. Bed is locked and in lowest possible position, side rails up x2, alarm is on, no slip footwear on. Call light/belongings within reach. Patient utilizes call light appropriately for assist as needed. Hourly rounds in place for safety, pt remains free from fall/injury. Will continue to monitor. Problem: Pain  Goal: Verbalizes/displays adequate comfort level or baseline comfort level  Outcome: Progressing  Pt denies pain unless movement or lower extremity is being assessed. Pt made aware of prn pain medication and nonpharmacologic interventions available.

## 2022-08-31 NOTE — PROGRESS NOTES
Progress Note    Admit Date: 8/30/2022  Day: 2  Diet: ADULT DIET; Regular; 4 carb choices (60 gm/meal); Low Sodium (2 gm); 1000 ml  ADULT ORAL NUTRITION SUPPLEMENT; Breakfast; Wound Healing Oral Supplement    CC: leg pain    Interval history: no overnight events. On interview, patient is alert and calm. Confirms HPI listed below. Otherwise no new or worsening symptoms. HPI: 23-year-old man with a past medical history of GERD, congestive heart failure, neurogenic bladder with chronic Iglesias, UTI, A fib, history of lower extremity venous wounds presented with right lower extremity pain and swelling. The patient was getting wound care at his nursing home ([address]) when his nurse told him that his foot looks infected and he needs to go to the ED as his wound looked infected. The patient also reports that he had a temperature at his nursing home with a T-max being 101.2. In the ED the patient had normal vitals, grossly normal CBC and BMP. His CRP was elevated at 25 and his ESR was elevated at 56. X-ray of the right lower extremity showed soft tissue swelling but no cortical bone loss or gas. The patient reports that he does recall pain in the right lower leg starting 1 to 2 days ago but beyond that is unable to provide much of a history. He denies chest pain, shortness of breath, abdominal pain, headache, sensory loss, motor weakness.     Medications:     Scheduled Meds:   vancomycin  750 mg IntraVENous Q12H    sodium chloride flush  5-40 mL IntraVENous 2 times per day    aspirin  81 mg Oral Daily    atorvastatin  80 mg Oral Nightly    furosemide  40 mg Oral Daily    gabapentin  600 mg Oral BID    levothyroxine  75 mcg Oral Daily    melatonin  5 mg Oral Nightly    tamsulosin  0.8 mg Oral Daily    traZODone  25 mg Oral Nightly    vitamin B-12  1,000 mcg Oral Daily    heparin (porcine)  5,000 Units SubCUTAneous 3 times per day    cefTRIAXone (ROCEPHIN) IV  1,000 mg IntraVENous Q24H     Continuous Infusions:   sodium chloride       PRN Meds:sodium chloride flush, sodium chloride, ondansetron **OR** ondansetron, polyethylene glycol, acetaminophen **OR** acetaminophen    Objective:   Vitals:   T-max:  Patient Vitals for the past 8 hrs:   BP Temp Temp src Pulse Resp SpO2 Height Weight   08/31/22 0816 -- -- -- -- -- 94 % -- --   08/31/22 0545 (!) 140/80 97 °F (36.1 °C) Oral 81 16 94 % -- --   08/31/22 0100 (!) 146/89 98.1 °F (36.7 °C) Oral 76 16 93 % 6' (1.829 m) 221 lb 5.5 oz (100.4 kg)       Intake/Output Summary (Last 24 hours) at 8/31/2022 0856  Last data filed at 8/31/2022 0100  Gross per 24 hour   Intake 50 ml   Output 1000 ml   Net -950 ml     Review of Systems   Constitutional:  Negative for activity change and appetite change. HENT:  Negative for dental problem, drooling, ear discharge, sinus pressure, sore throat and tinnitus. Respiratory:  Negative for apnea, cough and shortness of breath. Cardiovascular:  Negative for chest pain and leg swelling. Gastrointestinal:  Negative for abdominal distention. Genitourinary:  Negative for difficulty urinating, dysuria, frequency, genital sores, hematuria and penile pain. Skin:  Negative for color change. Neurological:  Negative for speech difficulty, numbness and headaches. Psychiatric/Behavioral:  Negative for agitation, behavioral problems, confusion, self-injury and suicidal ideas. Physical Exam  Constitutional:       Appearance: Normal appearance. HENT:      Head: Normocephalic and atraumatic. Nose: Nose normal.      Mouth/Throat:      Mouth: Mucous membranes are moist.   Eyes:      Extraocular Movements: Extraocular movements intact. Pupils: Pupils are equal, round, and reactive to light. Cardiovascular:      Rate and Rhythm: Normal rate and regular rhythm. Heart sounds:     No gallop. Pulmonary:      Effort: No respiratory distress. Breath sounds: No wheezing or rales.    Abdominal:      General: There is no distension. Palpations: There is no mass. Tenderness: There is no abdominal tenderness. There is no rebound. Hernia: No hernia is present. Musculoskeletal:         General: Tenderness present. No swelling. Right lower leg: Edema present. Left lower leg: Edema present. Skin:     General: Skin is warm. Capillary Refill: Capillary refill takes less than 2 seconds. Neurological:      Mental Status: He is alert and oriented to person, place, and time. Cranial Nerves: No cranial nerve deficit. Sensory: No sensory deficit. Motor: No weakness. Gait: Gait normal.   Psychiatric:         Mood and Affect: Mood normal.         Behavior: Behavior normal.         LABS:    CBC:   Recent Labs     08/30/22  1446 08/31/22  0555   WBC 9.6 9.5   HGB 11.5* 11.5*   HCT 35.2* 35.2*    178   MCV 94.9 94.0     Renal:    Recent Labs     08/30/22  1446 08/31/22  0554    136   K 4.4 3.9    100   CO2 26 26   BUN 36* 38*   CREATININE 1.2 1.0   GLUCOSE 106* 104*   CALCIUM 8.3 8.3   MG  --  2.10   PHOS  --  3.7   ANIONGAP 12 10     Hepatic:   Recent Labs     08/31/22  0554   LABALBU 3.1*     Troponin: No results for input(s): TROPONINI in the last 72 hours. BNP: No results for input(s): BNP in the last 72 hours. Lipids: No results for input(s): CHOL, HDL in the last 72 hours. Invalid input(s): LDLCALCU, TRIGLYCERIDE  ABGs:  No results for input(s): PHART, HDZ6SCD, PO2ART, IRF1HWG, BEART, THGBART, P3DYHKEK, SMU6YEH in the last 72 hours. INR: No results for input(s): INR in the last 72 hours. Lactate: No results for input(s): LACTATE in the last 72 hours. Cultures:  -----------------------------------------------------------------  RAD:   XR FOOT RIGHT (MIN 3 VIEWS)   Final Result   1. Suspected fracture of the second metatarsal shaft, incompletely assessed in this study. 2. Diffuse osteopenia in the tibia and fibula as well as the foot.  Soft tissue swelling in the foot.    3. Incomplete assessment of the digits. XR TIBIA FIBULA RIGHT (2 VIEWS)   Final Result   1. Suspected fracture of the second metatarsal shaft, incompletely assessed in this study. 2. Diffuse osteopenia in the tibia and fibula as well as the foot. Soft tissue swelling in the foot. 3. Incomplete assessment of the digits. MRI FOOT RIGHT WO CONTRAST    (Results Pending)       Assessment/Plan:     81-year-old man with a past medical history of GERD, congestive heart failure, neurogenic bladder with chronic Iglesias, UTI, A fib, history of lower extremity venous wounds presented with right lower extremity pain and swelling consistent with cellulitis. Cellulitis of right lower extremity  - The wound looks infected and he has a history of MRSA growing from his wound on a prior admission  - We will treat with vancomycin  - Blood cultures  - Trend CRP and Pro-Calc  - Podiatry already consulted from the ED  -- ID consulted  -- MRI RLE ordered for further assessment/possible surgical planning     CAD s/p CABG in 2013   -continue home aspirin 81 mg daily  -continue lipitor 80 mg nightly    Neuropathy  - Has an intrathecal baclofen pump to alleviate muscle spasticity 2/2 neuropathy  - Cont.  At 725.8mcg/day as prescribed outpatient pending interrogation     Hypothyroidism   -continue home synthyroid 75 mcg      BPH  -continue home flomax 0.8 mg PO daily   -chronic indwelling foleys catheter      Mood disorders   -continue home trazodone 25 mg PO nightly      Hx of Intertrigo likely 2/2 fungal rash  - Cont diflucan  - Miconazole powder to keep area dry     Code Status: Full code  FEN: Adult  PPX: SQH  DISPO: King Shara MD, PGY-1  08/31/22  8:56 AM    This patient has been staffed and discussed with Dave Nino MD.

## 2022-08-31 NOTE — PROGRESS NOTES
Admitted patient to room 39** from ED with dx: **.  Arrived to unit via stretcher with all belongings. No family present at this time. Vitals:    08/31/22 0100   BP: (!) 146/89   Pulse: 76   Resp: 16   Temp: 98.1 °F (36.7 °C)   SpO2: 93%       Isolation:  Contact Isolation for MRSA/wounds     GEN: Patient is alert & oriented, speech clear and appropriate. Pain: Denies pain at present. Pain/Discomfort is being managed with non pharmacological interventions (rest & elevation) and PRN analgesics per MD orders (See MAR). Patient is able to express and rate pain using numerical scale. IV:    IV site clean dry and intact without redness or pain. Sig Labs:         CV: HRRRR. Trace edema BLE. Palpable pulses. Lungs: Respirations easy, unlabored without cough. On Room Air, denies SOB. Lungs clear. Encouraged C&DB. GI/: No complaints of nausea/vomiting/diarrhea. Tolerating diet. Abdomen soft, non tender, with bowel sounds. Continent of bowel and bladder. Last BM 8/31/2022. Has a schneider, Urine output clear yellow, denies dysuria. Skin: Warm and dry. BLE. Buttock / Perineum Upper right thigh. See LDA    Mobility: Up as tolerated with raymundo lift. Safety: Oriented to room, call light, tv, phone and dietary services. Bed in lowest position and locked. Exit alarms in place. Non slip socks on. ID bracelet on and correct per patient verbally reporting name and date of birth. Plan of care and safety measures reviewed with the patient. Call light and needed items in reach. Disposition: ECF when stable.

## 2022-08-31 NOTE — PROGRESS NOTES
Podiatric Surgery Daily Progress Note  Mychal Harris Yawmena      Subjective :   Patient seen and examined this am at the bedside. Patient denies any acute overnight events. Patient denies N/V/F/C/SOB. Patient denies calf pain, thigh pain, chest pain. Review of Systems: A 12 point review of symptoms is unremarkable with the exception of the chief complaint. Patient specifically denies nausea, fever, vomiting, chills, shortness of breath, chest pain, abdominal pain, constipation or difficulty urinating. Objective     BP (!) 140/80   Pulse 81   Temp 97 °F (36.1 °C) (Oral)   Resp 16   Ht 6' (1.829 m)   Wt 221 lb 5.5 oz (100.4 kg)   SpO2 94%   BMI 30.02 kg/m²      I/O:  Intake/Output Summary (Last 24 hours) at 8/31/2022 0626  Last data filed at 8/31/2022 0100  Gross per 24 hour   Intake 50 ml   Output 1000 ml   Net -950 ml              Wt Readings from Last 3 Encounters:   08/31/22 221 lb 5.5 oz (100.4 kg)   07/07/22 218 lb 7.6 oz (99.1 kg)   06/02/22 210 lb 12.2 oz (95.6 kg)       LABS:    Recent Labs     08/30/22  1446   WBC 9.6   HGB 11.5*   HCT 35.2*           Recent Labs     08/30/22  1446      K 4.4      CO2 26   BUN 36*   CREATININE 1.2      No results for input(s): PROT, INR, APTT in the last 72 hours. LOWER EXTREMITY EXAMINATION    Dressing to B/L LE intact. No strikethrough noted to the external dressing. Sanguinous drainage noted to the internal layers of the dressing. Vascular:  DP and PT pulses non-palpable B/L but upon doppler examination were found to be biphasic B/L. CFT is brisk to the digits of the foot b/l. Skin temperature is warm to cool from proximal to distal with no focal increase in temperature noted. Moderate erythema noted to the dorsal aspect of the right foot and to the right anterior lower leg. Moderate erythema noted to the anterior left lower leg just distal to the tibial tuberosity. 2+ pitting edema noted to the right lower LE .  No pain with calf No active drainage noted. No acute signs of infection noted. MUSCULOSKELETAL: Muscle strength is 4/5 for all pedal groups tested. No pain with palpation of the foot or ankle b/l. Ankle joint ROM is decreased in dorsiflexion with the knee extended. 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 Imaging:  Narrative   4 views of the right tibia/fibula, 2 views of the right foot       HISTORY: Pain. FINDINGS:       In the right tibia/fibula. There is osteopenia. No definite periosteal bone formation or cortical destruction is seen. There are vascular calcifications. Within the foot, there is diffuse osteopenia. The digits are not well assessed on this examination. There appears to be a fracture in the second metatarsal shaft, incompletely assessed on this exam. Soft tissue swelling is present throughout the foot. Impression   1. Suspected fracture of the second metatarsal shaft, incompletely assessed in this study. 2. Diffuse osteopenia in the tibia and fibula as well as the foot. Soft tissue swelling in the foot. 3. Incomplete assessment of the digits. Laser skin perfusion study 5/27/22 right Impression:  Pulse volume recording at the level of the foot reveals waveforms within normal limits. Laser skin perfusion pressure study at the transmetatarsal level ([place]) is 148 mmHg; wound healing likely. This is considered  to have a good probability for healing. Laser skin perfusion pressure study at the transmetatarsal level (Medial plantar) is 112 mmHg; wound healing likely. This is  considered to have a good probability for healing. Arterial Duplex right 1/19/22: The right ankle/brachial index is 1.5 (the DP is 120 and the PT is 168 mmHg). The common femoral artery demonstrates multiphasic flow indicating no significant aorto-iliac inflow disease. Elevated velocities at the mid popliteal artery indicate a > 50% stenosis by velocity criteria (velocity went from 79 to 197 cm/s).   The peroneal artery could not be visualized (possibly due to extensive edema). Abnormal monophasic Doppler waveforms were noted in the tibial arteries at the ankle. There were no previous studies to use for comparison. 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 No Leukocytosis noted 9.6  -ESR 5.6 CRP 25.4  -prealbumin 17.4, oral nutritional supplement ordered  -Labs and Imagining reviewed  - Wound culture: +1 Wbcs, 1+ gram positive cocci . -Dressing applied to the right lower extremity consisting of wet to dry to the ulceration, adaptic, gauze, Kerlix and ACE. Dressing applied to the left lower extremity consisting of gauze, kerlix, ace.   -Antibiotics started from broad spectrum coverage. IV vancomycin and ceftriaxone  -Weightbearing status: patient non-ambulatory at baseline  - Keep dressing clean, dry, and intact  - Prevalon boots ordered. Patient to wear at all times to prevent further soft tissue breakdown. -ID consulted. Appreciate recommendations. --MRI ordered for further assessment of the R lower extremity and possible surgical planning. Laser perfusion studies from 5/27/22 revealed adequate healing potential to the right lower extremity. Arterial duplex right lower extremity from 1/19/22 reviewed. Similar findings were noted to the left lower extremity, with surgical intervention completed in May of this year with healed surgical incision to date. There is likely similar healing potential to the right lower extremity.  As the patient is non-ambulatory, he is likely not a candidate for revascularization. Vascular surgery will not be consulted at this time, but it is felt that if surgical intervention is deemed medically necessary that the healing potential is fair to the right lower extremity. DISPO: Cellulits RLE. Ulceration RLE, Hematoma RLE. MRI R foot ordered. ID consulted, Continue IV vancomycin and ceftriaxone Wound culture pending. Following MRI results possible surgical intervention may be necessary. No vascular consult at this time. Will continue with local wound care. Patient seen and evaluated bedside with ANGELINA Oliveros DPM   Podiatric Resident PGY1  Pager [phone] or Ramandeep  8/31/2022, 6:27 AM     Patient was seen and evaluated at bedside. Agree with residents assessment and treatment plan.   Luciana Iglesias DPM

## 2022-08-31 NOTE — DISCHARGE INSTR - COC
Continuity of Care Form    Patient Name: Javy Kapoor   :  1940  MRN:  [id number]    Admit date:  2022  Discharge date:  2022    Code Status Order: Full Code   Advance Directives:     Admitting Physician:  Capo Sierra MD  PCP: Jamel Márquez    Discharging Nurse: Jimmy Freitas, RN  [address] Unit/Room#: 2592/6117-00  Discharging Unit Phone Number: [phone]    Emergency Contact:   Extended Emergency Contact Information  Primary Emergency Contact: Dane Antonio  Home Phone: [phone]  Relation: Child  Secondary Emergency Contact: Rose Mary 22 Phone: [phone]  Work Phone: [phone]  Mobile Phone: [phone]  Relation: Child    Past Surgical History:  Past Surgical History:   Procedure Laterality Date    BACK SURGERY      lower lumbar    CORONARY ARTERY BYPASS GRAFT  2013    CABG x 5 (Dr Lexus Michel), svg to diag, om1 and 3, distal rca, kelly to lad.      CYSTOSCOPY N/A 1/10/2019    CYSTOSCOPY performed by Aden Abraham MD at [facility] [email or web address] Left 2022    LEFT FOOT PARTIAL FIFTH RAY RESECTION WITH EXCISIONAL DEBRIDEMENT OF MUSCLE AND FASCIA, WITH APPLICATION OF GRAFT performed by Jamaal Kim DPM at [address] [place] [address] Right 2015    ORIF    LEG SURGERY Right 2021    RIGHT LOWER EXTREMITY ADVANCEMENT FLAP AND SPLIT THICKNESS SKIN GRAFT PLACEMENT; (WOUND- 10 CM X 5.5 CM; CLOSURE- 6 CM X 5.5 CM; SKIN GRAFT- 7.2 CM X 5 CM) performed by Pravin Yan MD at [address] [place] [address] 2020    [address] [place] [address] performed by Sheldon Christine MD at [address] [place] [address] 2020    EGD BIOPSY performed by Sheldon Christine MD at [facility] ENDOSCOPY       Immunization History:   Immunization History   Administered Date(s) Administered    COVID-19, MODERNA BLUE border, Primary or Immunocompromised, (age 12y+), IM, 100 mcg/0.5mL 2021    Influenza A ([id number]) Vaccine PF IM 12/10/2009    Influenza Vaccine, unspecified formulation 12/13/2012, 11/06/2014, 10/13/2015, 09/01/2016, 11/03/2017, 10/26/2018, 09/20/2019    Influenza Virus Vaccine 12/19/2005, 12/14/2006, 12/13/2012, 11/30/2013, 10/13/2015, 09/20/2019    Influenza Whole 10/01/2007, 09/02/2010, 09/01/2011, 12/13/2012, 10/13/2015    Influenza, FLUARIX, FLULAVAL, (age 10 mo+) AND AFLURIA, Bandar Crome (age 1 y+), PF 11/04/2021    Influenza, High Dose (Fluzone 65 yrs and older) 11/06/2014, 10/20/2020    PPD Test 09/16/1997    Pneumococcal Conjugate 13-valent (Doclpny47) 12/29/2014    Pneumococcal Conjugate 7-valent (Prevnar7) 12/29/2014    Pneumococcal Polysaccharide (Vmxskgobf44) 01/27/2014       Active Problems:  Patient Active Problem List   Diagnosis Code    Hypotension I95.9    Light headed R42    Cellulitis L03.90    Essential hypertension I10    GERD (gastroesophageal reflux disease) K21.9    Acute blood loss anemia D62    Tinea corporis B35.4    Carotid stenosis I65.29    CAD (coronary artery disease) I25.10    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 XXXA    Complicated UTI (urinary tract infection) N39.0    Edema R60.9    Problem with urinary catheter ([facility] [address] T83. 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 8XXA    Weakness R53.1    Symptomatic bradycardia R00.1    Bilateral leg edema R60.0    Symptomatic sinus bradycardia R00.1    Multiple drug resistant organism (MDRO) culture positive Z16.24    Osteomyelitis ([facility]) M86.9    Chronic multifocal osteomyelitis of right foot ([facility]) M86.371    MRSA infection A49.02    Septic arthritis of interphalangeal joint of toe of right foot ([facility]) M00.9    Elevated sed rate R70.0    Elevated C-reactive protein (CRP) R79.82    Infection requiring contact isolation precautions B99.9    Class 1 obesity due to excess calories with body mass index (BMI) of 30.0 to 30.9 in adult E66.09, Z68.30    Weight loss counseling, encounter for Z71.3    Electrolyte imbalance E87.8       Isolation/Infection:   Isolation            No Isolation          Patient Infection Status       Infection Onset Added Last Indicated Last Indicated By Review Planned Expiration Resolved Resolved By    MRSA 05/25/22 05/27/22 05/25/22 Culture, Wound        MDRO (multi-drug resistant organism) 04/22/22 04/24/22 04/22/22 Culture, Urine        Resolved    C-diff (Clostridium difficile) 04/11/22 04/12/22 04/11/22 C. difficile toxin Molecular   05/26/22 Richie Correa, ALPHONSO    Pt has had no inpatient admissions in past 30 days (diagnosed on 4/11 and discharged 4/25), had been off antibiotics greater than 48 hrs (and had them restarted 5/25/22 upon admission) at the 30-day nuris (5/25 prior to them being started    C-diff Rule Out 04/11/22 04/12/22 04/11/22 C. difficile toxin Molecular (Ordered)   22 Rule-Out Test Resulted    C-diff Rule Out 22 Clostridium Difficile Toxin/Antigen (Ordered)   22 Rule-Out Test Resulted    INFLUENZA 22 Rapid influenza A/B antigens   22     COVID-19 (Rule Out) 22 COVID-19, Rapid (Ordered)   22 Rule-Out Test Resulted    COVID-19 (Rule Out) 22 COVID-19, Rapid (Ordered)   22 Rule-Out Test Resulted    COVID-19 (Rule Out) 22 COVID-19, Rapid (Ordered)   22 Rule-Out Test Resulted    MDRO (multi-drug resistant organism) 21 Culture, Urine   21 Ena Correa RN    New urine cx on 2021 with no growth/was negative    COVID-19 (Rule Out) 21 COVID-19, Rapid (Ordered)   21 Rule-Out Test Resulted    C-diff Rule Out 21 GI Bacterial Pathogens By PCR (Ordered)   21 Sheeba Martini RN    Order noted to be discontinued by MD; not included in GI pathogens order    COVID-19 (Rule Out) 21 COVID-19, Rapid (Ordered)   21 Rule-Out Test Resulted    C-diff Rule Out 20 Clostridium difficile toxin/antigen (Ordered)   21 Shaylee Gupta RN    No stool ever sent.      COVID-19 (Rule Out) 06/10/20 06/10/20 06/10/20 COVID-19 (Ordered)   06/10/20 Rule-Out Test Resulted    C-diff Rule Out 20 GI Bacterial Pathogens By PCR (Ordered)   06/10/20 Rule-Out Test Resulted    C-diff Rule Out 20 Clostridium difficile toxin/antigen (Ordered)   20 Rule-Out Test Resulted    C-diff Rule Out 20 C. difficile toxin Molecular (Ordered)   20 Rule-Out Test Resulted    MDRO (multi-drug resistant organism) 20 Culture, Urine   21 Ena Correa, RN    Has had new urine culture on 4/17/21 with mixed anne; no MDRO noted    COVID-19 (Rule Out) 05/03/20 05/03/20 05/03/20 COVID-19 (Ordered)   05/03/20 Rule-Out Test Resulted    C-diff Rule Out 05/02/20 05/02/20 05/03/20 Clostridium difficile toxin/antigen (Ordered)   05/03/20 Rule-Out Test Resulted    MRSA 02/07/20 02/08/20 02/07/20 MRSA DNA Probe, Nasal   05/04/20 Jamal Hinkle    MDRO (multi-drug resistant organism) 10/26/19 10/28/19 11/19/19 Urine culture   01/16/20 Baldemar Temple            Nurse Assessment:  Last Vital Signs: /77   Pulse 77   Temp 98.2 °F (36.8 °C) (Oral)   Resp 16   Ht 6' (1.829 m)   Wt 221 lb 5.5 oz (100.4 kg)   SpO2 96%   BMI 30.02 kg/m²     Last documented pain score (0-10 scale): Pain Level: 6  Last Weight:   Wt Readings from Last 1 Encounters:   08/31/22 221 lb 5.5 oz (100.4 kg)     Mental Status:  oriented and alert    IV Access:  - PICC - site  R Basilic, insertion date: 09/07/2022    Nursing Mobility/ADLs:  Walking   Dependent  Transfer  Dependent  Bathing  Dependent  Dressing  Dependent  Toileting  Dependent  Feeding  Assisted  Med Admin  Assisted  Med Delivery   whole    Wound Care Documentation and Therapy:  Negative Pressure Wound Therapy Leg Anterior; Lower;Right (Active)   Number of days: 302       Wound 11/01/21 Buttocks Right;Left redness, open areas, stage 2 ulcer (Active)   Number of days: 303       Wound 01/27/22 Sacrum Inner;Medial;Left;Right (Active)   Number of days: 216       Wound 01/27/22 Pretibial Right (Active)   Number of days: 216       Wound 01/27/22 Elbow Right;Posterior (Active)   Number of days: 216       Wound 05/26/22 Sacrum Medial (Active)   Number of days: 97       Incision 05/29/22 Foot Anterior; Left (Active)   Number of days: 94       Incision 11/02/21 Thigh Anterior;Right (Active)   Number of days: 302        Elimination:  Continence:    Bowel: No  Bladder: No  Urinary Catheter: Indication for Use of Catheter: Acute urinary retention/obstruction Colostomy/Ileostomy/Ileal Conduit: No       Date of Last BM: 09/06/2022    Intake/Output Summary (Last 24 hours) at 8/31/2022 1501  Last data filed at 8/31/2022 0907  Gross per 24 hour   Intake 50 ml   Output 1325 ml   Net -1275 ml     I/O last 3 completed shifts: In: 48 [IV Piggyback:50]  Out: 1000 [Urine:1000]    Safety Concerns:     None    Impairments/Disabilities:      None    Nutrition Therapy:  Current Nutrition Therapy:   - Oral Diet:  General  - Oral Nutrition Supplement:  See MAR    Routes of Feeding: Oral  Liquids: Thin Liquids  Daily Fluid Restriction: no  Last Modified Barium Swallow with Video (Video Swallowing Test): not done    Treatments at the Time of Hospital Discharge:   Respiratory Treatments: n/a  Oxygen Therapy:  is not on home oxygen therapy. Ventilator:    - No ventilator support    Heart Failure Instructions for Daily Management  Patient was treated for chronic diastolic heart failure. he  will require the following:    Please weigh daily on the same scale and approximately the same time of day. Report weight gain of 3 pounds/day or 5 pounds/week to :  [facility] [phone] and facility MD.  Please use hospital discharge weight as baseline reference. Please monitor for signs and symptoms of and report to MD:  Worsening Heart Failure: sudden weight gain, shortness of breath, lower extremity or general edema/swelling, abdominal bloating/swelling, inability to lie flat, intolerance to usual activity, or cough (especially at night). Report these finding even if no increase in weight. Dehydration:  having difficulty or a decrease in urination, dizziness, worsening fatigue, or new onset/worsening of generalized weakness. Please continue a LOW SODIUM diet and LIMIT fluid intake to 48 - 64 ounces ( 1.5 - 2 liters) per day. Call [facility] [phone] and facility MD with any questions or concerns.    Please continue heart failure education to patient and family/support system. See After Visit Summary for hospital follow up appointment details. Consider spiritual care referral for support and/or completion of advance directives . Consider: having the facility MD complete required 7 day follow up. Patient's primary cardiologist is Dr Turcios Sour: Physical Therapy and Occupational Therapy  Weight Bearing Status/Restrictions: Non-weight bearing on right leg  Other Medical Equipment (for information only, NOT a DME order):  as needed  Other Treatments: n/a    Patient's personal belongings (please select all that are sent with patient):  None    RN SIGNATURE:  Leighann Shepherd, RN    CASE MANAGEMENT/SOCIAL WORK SECTION    Inpatient Status Date: ***    Readmission Risk Assessment Score:  Readmission Risk              Risk of Unplanned Readmission:  39           Discharging to Facility/ Agency   Name: [facility]   Address:  Phone: [phone]  Fax:    Dialysis Facility (if applicable)   Name:  Address:  Dialysis Schedule:  Phone:  Fax:    / signature: Electronically signed by CARLIN Ridley on 9/7/22 at 12:51 PM EDT    PHYSICIAN SECTION    Prognosis: Fair    Condition at Discharge: Stable    Rehab Potential (if transferring to Rehab): Fair    Recommended Labs or Other Treatments After Discharge:   INFUSION ORDERS:  Ceftriaxone 2 gm iv daily through 10/28  (In addition to Linezolid 600 mg po bid for same duration)  - Diagnosis - R foot osteomyelitis  - First dose given in hospital  - PICC   - Disposition / date discharge  - Check CBC w diff, CMP, ESR, CRP every Mon or Tue - FAX result to [phone]  - Call with antibiotic / infusion issues, [phone]  - Call with any change in status, transfer in or out of a facility or to hospital - [phone]  - No f/u in outpatient ID office necessary    Podiatric Post Operative Instructions: You have had a surgical procedure on your right foot. Fluids and Diet:  Begin with clear liquids, broth, dry toast, and crackers. If not nauseated then resume your regular pre-operative diet when you are ready    Medications: Take your prescriptions as directed   If your pain is not severe then you may take the non-prescription medication that you normally take for aches and pains  You may resume your regularly scheduled medications (unless otherwise directed)  If any side effects or adverse reactions occur, discontinue the medication and contact your doctor. Review the patient drug information that is provided before you take any medication    Ambulation and Activity:  You are advised to go directly home from the hospital  Use wheelchair as needed  You may not put weight on the operated foot. You should wear the surgical shoe at all times when awake. Avoid stairs if possible. Do not lift or move heavy objects  Do not drive until cleared by Dr. Stefan Marsh, DPMISTI    Bandage and Wound Care Instructions:  Keep bandage clean and dry  Do not shower or bathe the operative extremity  Do not remove the bandage (unless otherwise directed)  Do not attempt to put anything between the cast or dressing and your skin, some itching is normal.    Ice and Elevation:  Elevate operative extremity as much as possible to reduce swelling and discomfort. Elevate with 2 pillows at or above the level of the heart for the first 72 hours. Ice:  [facility] dispensed insulated ice bag over the bandage 20 minutes of every hour while awake for the first 72 hours. You may not behind the knee as well. Special Instructions: Call your doctor immediately if you develop any of the following. Fever over 100 degrees by mouth - take your temperature daily until your first follow up visit. Pain not relieved by medication ordered  Swelling, increased redness, warmth, or hardness around operative area. Numb, tingling or cold toes.   Toe(s) become white or bluish  Bandage becomes wet, soiled, or blood soaked (small amount of bleeding may be normal)  Increased or progressive drainage from surgical area. Follow up instructions: You will need to follow up with Dr. Jeffrey Richardson's office in the next 5 to 7 days. Call [phone] when you get home to make an appointment. Call Dr. Jeffrey Richardson's office if you have any questions or concerns. Dr. Damián Chung DPM  [facility]  Office: [phone]  Fax: [phone]     Physician Certification: I certify the above information and transfer of Susi Graff  is necessary for the continuing treatment of the diagnosis listed and that he requires [facility] for greater 30 days.      Update Admission H&P: No change in H&P    PHYSICIAN SIGNATURE:  Electronically signed by Guillaume Manley MD on 9/6/22 at 2:28 PM EDT

## 2022-08-31 NOTE — DISCHARGE INSTRUCTIONS
Follow up with Dr. Bennie Mark via phone within 1 week of discharge and set up appointment to change baclofen pump. Follow up with PCP Dr. Lexy Melendez within 1 week of discharge for medical management of chronic conditions. Extra Heart Failure sites:     https://LeisureLink.com/ --- this is American Heart Association interactive Healthier Living with Heart Failure guidebook. Please copy and paste link into search bar. Use your mouse to scroll through the pages. Lots and lots of info / tips    [address] [address] --- free smart phone duran- (icon will look like a lopsided pink heart) --Use your phone to track sodium / fluid intake, symptoms, weight, etc.    [employer] - website-- [employer] is a dialysis company. All dialysis patients follow a renal diet which IS low sodium!! This website offers free seasonal cookbooks. Each quarter, they will release 25-30 new recipes with a breakdown of calories, sodium, glucose, etc     www.Tourvia.me/recipes -- more free recipes    Podiatric Post Operative Instructions: You have had a surgical procedure on your right foot. Fluids and Diet:  Begin with clear liquids, broth, dry toast, and crackers. If not nauseated then resume your regular pre-operative diet when you are ready    Medications: Take your prescriptions as directed  If your pain is not severe then you may take the non-prescription medication that you normally take for aches and pains  You may resume your regularly scheduled medications (unless otherwise directed)  If any side effects or adverse reactions occur, discontinue the medication and contact your doctor. Review the patient drug information that is provided before you take any medication    Ambulation and Activity:  You are advised to go directly home from the hospital  Use wheelchair as needed  You may not put weight on the operated foot. You should wear the surgical shoe at all times when awake. Avoid stairs if possible.   Do not lift or move

## 2022-08-31 NOTE — CONSULTS
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 Woke up feeling warm reports having T of 102 at facility this am.        HISTORY OF PRESENT ILLNESS:      [de-identified] yo man   Hx AF, JARAD, CHF, neurogenic bladder with chronic schneider  Hx mult surg - CABG, lumbar, R hip ORIF    Hx UTI, hx LE venous wounds, hx mult admission - last 4/22-25  Non-ambulatory, lives in [facility]     Admit 5/25/22  with worse appearance LE wounds  X-ray R foot 5th MT OM, MRI with R 5th prox phalanx / MTP joint / distal MT infection  Cult mod MRSA, heavy mixed skin anne  L partial 5th ray resection  Discharged on po linezolid x 2 weeks     Presents 8/30/22 with fever, R foot redness / wound drainage. He had temp 101.2 at facility. In ED, T 99, WBC 9.6, Cr 1.2  Admit, started on Vancomycin + Ceftriaxone    Today 8/31 -   Seen by Podiatry, reviewed consult, images.         Past Medical History:    Past Medical History:   Diagnosis Date    BPH (benign prostatic hyperplasia)     C. difficile colitis 04/11/2022    Carotid stenosis 12/2013    JESU 23-01% stenosis; LICA 73-02% stenosis    Cellulitis 12/2013    LLE    Chronic back pain     Encounter for imaging to screen for metal prior to MRI 05/26/2022    [employer]: Synchromed II pump for baclofen -lfe    GERD (gastroesophageal reflux disease)     History of atrial fibrillation     Hypertension     Lower GI bleed     MDRO (multiple drug resistant organisms) resistance 10/26/2019    urine    Neuromuscular disorder (HCC)     spasticity    Renal insufficiency     Septic arthritis of interphalangeal joint of toe of right foot ([facility] 32/49/4548    Systolic congestive heart failure (HCC)     Vitamin B12 deficiency        Past Surgical History:    Past Surgical History:   Procedure Laterality Date    BACK SURGERY  2006    lower lumbar    CORONARY ARTERY BYPASS GRAFT  12/13/2013    CABG x 5 (Dr Karina Richards), svg to diag, om1 and 3, distal rca, kelly to lad. CYSTOSCOPY N/A 1/10/2019    CYSTOSCOPY performed by Matt Will MD at [facility] [employer] Left 5/29/2022    LEFT FOOT PARTIAL FIFTH RAY RESECTION WITH EXCISIONAL DEBRIDEMENT OF MUSCLE AND FASCIA, WITH APPLICATION OF GRAFT performed by Elyse Wagner DPM at [address] Right 5/1/2015    ORIF    LEG SURGERY Right 11/2/2021    RIGHT LOWER EXTREMITY ADVANCEMENT FLAP AND SPLIT THICKNESS SKIN GRAFT PLACEMENT; (WOUND- 10 CM X 5.5 CM; CLOSURE- 6 CM X 5.5 CM; SKIN GRAFT- 7.2 CM X 5 CM) performed by Bishop Ev MD at [address] 6/11/2020    [facility] performed by Jake Gruber MD at [address] [facility] N/A 5/4/2020    EGD BIOPSY performed by Jake Gruber MD at [facility] ENDOSCOPY       Current Medications:     vancomycin  750 mg IntraVENous Q12H    sodium chloride flush  5-40 mL IntraVENous 2 times per day    aspirin  81 mg Oral Daily    atorvastatin  80 mg Oral Nightly    furosemide  40 mg Oral Daily    gabapentin  600 mg Oral BID    levothyroxine  75 mcg Oral Daily    melatonin  5 mg Oral Nightly    tamsulosin  0.8 mg Oral Daily    traZODone  25 mg Oral Nightly    vitamin B-12  1,000 mcg Oral Daily    heparin (porcine)  5,000 Units SubCUTAneous 3 times per day    cefTRIAXone (ROCEPHIN) IV  1,000 mg IntraVENous Q24H       Allergies:  Bactrim [sulfamethoxazole-trimethoprim]    Social History:    TOBACCO:    None, past cig  ETOH:    None  DRUGS:   None   MARITAL STATUS:      OCCUPATION:   Retired     Family History:   No immunodeficiency    REVIEW OF SYSTEMS:    No fever / chills / sweats. No weight loss.   No visual change, eye pain, eye discharge. No oral lesion, sore throat, dysphagia. Denies cough / sputum. Denies chest pain, palpitations. Denies n / v / abd pain. No diarrhea. Denies dysuria or change in urinary function. Denies joint swelling or pain. No myalgia, arthralgia. Denies skin changes, itching  Denies focal weakness, sensory change or other neurologic symptom    Denies new / worse depression, psychiatric symptoms    PHYSICAL EXAM:      Vitals:    /77   Pulse 77   Temp 98.2 °F (36.8 °C) (Oral)   Resp 16   Ht 6' (1.829 m)   Wt 221 lb 5.5 oz (100.4 kg)   SpO2 96%   BMI 30.02 kg/m²     GENERAL: No apparent distress.     HEENT: Membranes moist, no oral lesion, PERRL  NECK:  Supple, no lymphadenopathy  LUNGS: Clear b/l, no rales, no dullness  CARDIAC: RRR, no murmur appreciated  ABD:  + BS, soft / NT  EXT:  LE dressing / ACE (reviewed images)  NEURO: No focal neurologic findings  PSYCH: Orientation, sensorium, mood normal  LINES:  Peripheral iv    DATA:    Lab Results   Component Value Date    WBC 9.5 08/31/2022    HGB 11.5 (L) 08/31/2022    HCT 35.2 (L) 08/31/2022    MCV 94.0 08/31/2022     08/31/2022     Lab Results   Component Value Date    CREATININE 1.0 08/31/2022    BUN 38 (H) 08/31/2022     08/31/2022    K 3.9 08/31/2022     08/31/2022    CO2 26 08/31/2022       Hepatic Function Panel:   Lab Results   Component Value Date/Time    ALKPHOS 45 04/22/2022 04:37 PM    ALT 25 04/22/2022 04:37 PM    AST 20 04/22/2022 04:37 PM    PROT 6.3 04/22/2022 04:37 PM    BILITOT <0.2 04/22/2022 04:37 PM    BILIDIR <0.2 06/20/2021 02:16 AM    IBILI see below 06/20/2021 02:16 AM    LABALBU 3.1 08/31/2022 05:54 AM       11/30 ESR 56, CRP 25    Micro:  5/25 Wound culture  - heavy growth mixed skin anne, mod MRSA  Staph aureus mrsa   Antibiotic Interpretation Microscan     ceFAZolin Resistant 8 mcg/mL   clindamycin Sensitive <=0.5 mcg/mL   DAPTOmycin Sensitive 1 mcg/mL   erythromycin Resistant >4 mcg/mL linezolid Sensitive 4 mcg/mL   oxacillin Resistant >2 mcg/mL   tetracycline Sensitive <=4 mcg/mL   trimethoprim-sulfamethoxazole Sensitive <=0.5/9.5 mcg/mL   vancomycin Sensitive 2 mcg/mL      BC x 2 no growth to date     R foot wound: heavy S aureus, light K pneumoniae, light K aerogenes      Imagin/25 X-ray Tibia, Fibula right - No acute findings.  X-ray left foot:  Impression   Acute osteomyelitis in the 5th metatarsal bone.  X-ray right foot - unremarkable.  Venous Doppler BL extremity  Nondiagnostic right ROSARIO due to vessel noncompressibility. Normal left ROSARIO at rest.    Less than 50% stensoes of CFA and popliteal artery however this study is not    diagnostic due to imaging difficulties associated with leg edema. Clinical    correlation recommended.  MRI left foot  Impression   1. Acute osteomyelitis in the 5th metatarsal bone and proximal phalanx with septic arthritis at the MTP joint. 2.  Regional cellulitis and myositis. 3.  No evidence of abscess. IMPRESSION:      Hx AF, JARAD, CHF, neurogenic bladder with chronic schneider  Hx LE venous wounds  Hx mult admissions    Hx R 5th partial ray resection 22, Stravix graft placed    R LE cellulitis  LE wounds    RECOMMENDATIONS:    Start Linezolid   Cont Ceftriaxone  Will f/u on cult - await sensitivities (note - prior MRSA isolate with Vanco YANCY 2)  Wound care per Podiatry    Medical Decision Making: The following items were considered in medical decision making:  Discussion of patient care with other providers  Reviewed clinical lab tests  Reviewed radiology tests  Reviewed other diagnostic tests/interventions  Independent review of radiologic images  Microbiology cultures and other micro tests reviewed      Risk of Complications/Morbidity: High   Illness(es)/ Infection present that pose threat to bodily function.    There is potential for severe exacerbation of infection/side effects of treatment.   Therapy requires intensive monitoring for antimicrobial agent toxicity    Will confer with Podiatry  Discussed with mackenzie Ivey MD

## 2022-08-31 NOTE — CARE COORDINATION
Case Management Assessment           Initial Evaluation                Date / Time of Evaluation: 8/31/2022 12:07 PM                 Assessment Completed by: ARIC Stockton    Patient Name: Maria Teresa Loo     YOB: 1940  Diagnosis: Cellulitis [L03.90]  Wound infection [T14. 8XXA, L08.9]  Diabetic foot infection ([address] [X59.202, L08.9]     Date / Time: 8/30/2022  1:29 PM    Patient Admission Status: Inpatient    If patient is discharged prior to next notation, then this note serves as note for discharge by case management.      Current PCP: [address] [place] [address] Patient: No    Chart Reviewed: Yes  Patient/ Family Interviewed: Yes    Initial assessment completed at bedside with: patient    Hospitalization in the last 30 days: No    Emergency Contacts:  Extended Emergency Contact Information  Primary Emergency Contact: Le[facility] Phone: [phone]  Relation: Child  Secondary Emergency Contact: [place] [address] Phone: [phone]  Work Phone: [phone]  Mobile Phone: [phone]  Relation: Child    Advance Directives:   Code Status: Full [address]: No  Agent:   Contact Number:     Copy present: No     In paper Chart: No    Scanned into EMR No    Financial  Payor: Karri Kan / Plan: [place] HMO / Product Type: *No Product type* /     Pre-cert required for SNF: Yes    [facility] (Now [address] [facility]) - [place] [address]  [address] [facility] [address]  Phone: [phone] Fax: [phone]    [facility] - [place] [id number] [place] [address] [address]  Phone: [phone] Fax: [phone]      Potential assistance Purchasing Medications: Potential Assistance Purchasing Medications: No  Does Patient want to participate in local refill/ meds to beds program?: No    Meds To Beds General Rules:  1. Can ONLY be done Monday- Friday between 8:30am-5pm  2. Prescription(s) must be in pharmacy by 3pm to be filled same day  3. Copy of patient's insurance/ prescription drug card and patient face sheet must be sent along with the prescription(s)  4. Cost of Rx cannot be added to hospital bill. If financial assistance is needed, please contact unit  or ;  or  CANNOT provide pharmacy voucher for patients co-pays  5.  Patients can then  the prescription on their way out of the hospital at discharge, or pharmacy can deliver to the bedside if staff is available. (payment due at time of pick-up or delivery - cash, check, or card accepted)     Able to afford home medications/ co-pay costs: Yes    ADLS  Support Systems: Family Members, Children    PT AM-PAC:   /24  OT AM-PAC:   /24    [place]: Assisted Living at [address]  Steps: none    Plans to RETURN to current housing: Yes  Barriers to RETURNING to current housing: none    [address]  Currently ACTIVE with [address]: Yes  [address]: [facility] [address]  Phone: [phone] Fax: [phone]    Currently ACTIVE with [place] [facility]: No  Passport/ Waiver: No  Passport/ Waiver Services: Not Applicable    :  Phone:       [address]  [facility] Provider: unknown  Equipment: wheelchair    Home Oxygen and [address] [place] prior to admission: No  [address]: Not Applicable  Other Respiratory Equipment: n/a    Informed of need to bring portable home O2 tank on day of DISCHARGE for nursing to connect prior to leaving: No  Verbalized agreement/Understanding: No  Person to bring portable tank at discharge: n/a    Dialysis  Active with HD/PD prior to admission: No  Nephrologist: [address]  Not Applicable    DISCHARGE PLAN:  Disposition: Home with [address]: [address] [place] [address] for discharge:  either EMS transport or Myriam Chime with the [facility] bus      Factors facilitating achievement of predicted outcomes: Motivated, Cooperative, and Pleasant    Barriers to discharge: IV ABX, wound culture pending, MRI right foot    Additional Case Management Notes: Patient is from [place] [address] at [facility]. Patient plans to discharge back to [facility]. Patient is active with [place] and will continue with these services. Patient states wound nurse comes out MWF and PT comes out once a week. Patient reports he has an appointment on Friday to replace his baclofen pump, which may need to be rescheduled depending on course of stay at hospital.    The Plan for Transition of Care is related to the following treatment goals of Cellulitis [L03.90]  Wound infection [T14. 8XXA, L08.9]  Diabetic foot infection ([address] [S79.543, L08.9]    The Patient and/or patient representative Ang Graf and his family were provided with a choice of provider and agrees with the discharge plan Yes    Freedom of choice list was provided with basic dialogue that supports the patient's individualized plan of care/goals and shares the quality data associated with the providers.  Yes    Care Transition patient: No    ARIC Matos  [facility] [employer]  Case Management Department  Ph: [phone]   Fax: [phone] Single liveborn, born in hospital, delivered by vaginal delivery

## 2022-08-31 NOTE — PROGRESS NOTES
Clinical Pharmacy Progress Note    Vancomycin - Management by Pharmacy    Consult Date(s):  8/31  Consulting Provider(s):  Dr. Gregg Shirley / Plan:  1)  RLE cellulitis with ulcers - Vancomycin  Concurrent Antimicrobials: Ceftriaxone   Day of Vanc Therapy / Ordered Duration: 1 of 5  Current Dosing Method: Bayesian-guided AUC  Therapeutic Goal: AUC < 500 mg/L*hr  Plan:  Based on estimated kinetics and prior experience dosing Vanc for this patient, will continue 750mg IV q12h. Level is ordered prior to 4th total dose, due tomorrow at 6am.  Will continue to monitor clinical condition and make adjustments to regimen as appropriate. Please call with questions--  Thanks--  Oren Hamilton, PharmD, BCPS, BCGP  [id number] ([facility])   8/31/2022 1:14 PM      Interval update:  Imaging pending. ID consulted. Subjective/Objective:   Maria Teresa Loo is a 81 y.o. y/o male with a PMHx that includes CAD s/p CABG (2013), carotid stenosis, HF, chronic back pain, GERD, A.fib, HTN, HLD, NM disorder with spasticity, BPH, neurogenic bladder with chronic indwelling cathter, MDRO UTI's, and recent admission in May 2022 for B/L LE wounds with LLE OM requiring left foot partial 5th ray resection who is admitted with RLE cellulitis with full thickness ulcerations to lateral right 5th digit and  RLE hematoma and bulla. Pharmacy is consulted to dose Vancomycin. Ht Readings from Last 1 Encounters:   08/31/22 6' (1.829 m)     Wt Readings from Last 1 Encounters:   08/31/22 221 lb 5.5 oz (100.4 kg)     Vancomycin dosing history:  June 2022 - Pt was on 750mg IV q12h, with level resulting in calculated AUC = 474. Pt SCr 0.6-0.7 and wt 100.4kg at that time. May 2022 - Pt was on 750mg IV q12h, with level resulting in calculated AUC = 560. Dose adjusted to 1500mg q24h - Vancomycin was discontinued prior to obtaining repeat level. Pt SCr 0.8-0.9 and wt 99.3kg at that time.     Current and Prior Antimicrobials:  Ceftriaxone 1000mg IV q24h (8/30-current)  Vancomycin - Pharmacy to dose   1500mg IV x1 8/30   750mg IV q12h (8/31-current)    Vanc Level(s) / Doses:  Date Time Dose Level / Type of Level Interpretation   9/1 05:00 750mg q12h Trough = ordered           Note: Serum levels collected for AUC-based dosing may be high if collected in close proximity to the dose administered. This is not necessarily an indicator of toxicity. Recent Labs     08/30/22  1446 08/31/22  0554 08/31/22  0555   CREATININE 1.2 1.0  --    BUN 36* 38*  --    WBC 9.6  --  9.5       Estimated Creatinine Clearance: 71 mL/min (based on SCr of 1 mg/dL).     Cultures & Sensitivities:  Date Site Micro Susceptibility / Result   8/30 Blood x2 sent    8/30 Wound sent      Recent Labs     08/31/22  0554   PROCAL 0.08

## 2022-09-01 ENCOUNTER — APPOINTMENT (OUTPATIENT)
Dept: GENERAL RADIOLOGY | Age: 82
DRG: 475 | End: 2022-09-01
Payer: MEDICARE

## 2022-09-01 LAB
ALBUMIN SERPL-MCNC: 2.9 G/DL (ref 3.4–5)
ANION GAP SERPL CALCULATED.3IONS-SCNC: 13 MMOL/L (ref 3–16)
BASOPHILS ABSOLUTE: 0.1 K/UL (ref 0–0.2)
BASOPHILS RELATIVE PERCENT: 0.6 %
BUN BLDV-MCNC: 38 MG/DL (ref 7–20)
C-REACTIVE PROTEIN: 32.3 MG/L (ref 0–5.1)
CALCIUM SERPL-MCNC: 8 MG/DL (ref 8.3–10.6)
CHLORIDE BLD-SCNC: 104 MMOL/L (ref 99–110)
CO2: 23 MMOL/L (ref 21–32)
CREAT SERPL-MCNC: 0.9 MG/DL (ref 0.8–1.3)
EOSINOPHILS ABSOLUTE: 1.1 K/UL (ref 0–0.6)
EOSINOPHILS RELATIVE PERCENT: 13.5 %
GFR AFRICAN AMERICAN: >60
GFR NON-AFRICAN AMERICAN: >60
GLUCOSE BLD-MCNC: 99 MG/DL (ref 70–99)
HCT VFR BLD CALC: 31.9 % (ref 40.5–52.5)
HEMOGLOBIN: 10.4 G/DL (ref 13.5–17.5)
LYMPHOCYTES ABSOLUTE: 1.3 K/UL (ref 1–5.1)
LYMPHOCYTES RELATIVE PERCENT: 16.5 %
MAGNESIUM: 2.1 MG/DL (ref 1.8–2.4)
MCH RBC QN AUTO: 30.6 PG (ref 26–34)
MCHC RBC AUTO-ENTMCNC: 32.5 G/DL (ref 31–36)
MCV RBC AUTO: 94.3 FL (ref 80–100)
MONOCYTES ABSOLUTE: 0.4 K/UL (ref 0–1.3)
MONOCYTES RELATIVE PERCENT: 5.3 %
NEUTROPHILS ABSOLUTE: 5.2 K/UL (ref 1.7–7.7)
NEUTROPHILS RELATIVE PERCENT: 64.1 %
PDW BLD-RTO: 15.4 % (ref 12.4–15.4)
PHOSPHORUS: 3.9 MG/DL (ref 2.5–4.9)
PLATELET # BLD: 173 K/UL (ref 135–450)
PMV BLD AUTO: 8.8 FL (ref 5–10.5)
POTASSIUM SERPL-SCNC: 4.4 MMOL/L (ref 3.5–5.1)
PROCALCITONIN: 0.11 NG/ML (ref 0–0.15)
RBC # BLD: 3.39 M/UL (ref 4.2–5.9)
SODIUM BLD-SCNC: 140 MMOL/L (ref 136–145)
WBC # BLD: 8.1 K/UL (ref 4–11)

## 2022-09-01 PROCEDURE — 6360000002 HC RX W HCPCS: Performed by: STUDENT IN AN ORGANIZED HEALTH CARE EDUCATION/TRAINING PROGRAM

## 2022-09-01 PROCEDURE — 6370000000 HC RX 637 (ALT 250 FOR IP): Performed by: STUDENT IN AN ORGANIZED HEALTH CARE EDUCATION/TRAINING PROGRAM

## 2022-09-01 PROCEDURE — 1200000000 HC SEMI PRIVATE

## 2022-09-01 PROCEDURE — 86140 C-REACTIVE PROTEIN: CPT

## 2022-09-01 PROCEDURE — 93005 ELECTROCARDIOGRAM TRACING: CPT

## 2022-09-01 PROCEDURE — 85025 COMPLETE CBC W/AUTO DIFF WBC: CPT

## 2022-09-01 PROCEDURE — 80069 RENAL FUNCTION PANEL: CPT

## 2022-09-01 PROCEDURE — 2580000003 HC RX 258: Performed by: INTERNAL MEDICINE

## 2022-09-01 PROCEDURE — 83036 HEMOGLOBIN GLYCOSYLATED A1C: CPT

## 2022-09-01 PROCEDURE — 71046 X-RAY EXAM CHEST 2 VIEWS: CPT

## 2022-09-01 PROCEDURE — 83735 ASSAY OF MAGNESIUM: CPT

## 2022-09-01 PROCEDURE — 99232 SBSQ HOSP IP/OBS MODERATE 35: CPT | Performed by: INTERNAL MEDICINE

## 2022-09-01 PROCEDURE — 6370000000 HC RX 637 (ALT 250 FOR IP): Performed by: INTERNAL MEDICINE

## 2022-09-01 PROCEDURE — 2580000003 HC RX 258: Performed by: STUDENT IN AN ORGANIZED HEALTH CARE EDUCATION/TRAINING PROGRAM

## 2022-09-01 PROCEDURE — 36415 COLL VENOUS BLD VENIPUNCTURE: CPT

## 2022-09-01 PROCEDURE — 6360000002 HC RX W HCPCS: Performed by: INTERNAL MEDICINE

## 2022-09-01 PROCEDURE — 6370000000 HC RX 637 (ALT 250 FOR IP)

## 2022-09-01 PROCEDURE — 84145 PROCALCITONIN (PCT): CPT

## 2022-09-01 RX ADMIN — CEFTRIAXONE 2000 MG: 2 INJECTION, POWDER, FOR SOLUTION INTRAMUSCULAR; INTRAVENOUS at 23:37

## 2022-09-01 RX ADMIN — ACETAMINOPHEN 650 MG: 325 TABLET ORAL at 09:02

## 2022-09-01 RX ADMIN — FUROSEMIDE 40 MG: 40 TABLET ORAL at 08:51

## 2022-09-01 RX ADMIN — TRAZODONE HYDROCHLORIDE 100 MG: 100 TABLET ORAL at 23:33

## 2022-09-01 RX ADMIN — ACETAMINOPHEN 650 MG: 325 TABLET ORAL at 23:32

## 2022-09-01 RX ADMIN — GABAPENTIN 600 MG: 600 TABLET, FILM COATED ORAL at 23:33

## 2022-09-01 RX ADMIN — HEPARIN SODIUM 5000 UNITS: 5000 INJECTION INTRAVENOUS; SUBCUTANEOUS at 23:32

## 2022-09-01 RX ADMIN — SODIUM CHLORIDE, PRESERVATIVE FREE 10 ML: 5 INJECTION INTRAVENOUS at 08:51

## 2022-09-01 RX ADMIN — LEVOTHYROXINE SODIUM 75 MCG: 0.07 TABLET ORAL at 06:06

## 2022-09-01 RX ADMIN — HEPARIN SODIUM 5000 UNITS: 5000 INJECTION INTRAVENOUS; SUBCUTANEOUS at 14:54

## 2022-09-01 RX ADMIN — LINEZOLID 600 MG: 600 TABLET, FILM COATED ORAL at 23:33

## 2022-09-01 RX ADMIN — HEPARIN SODIUM 5000 UNITS: 5000 INJECTION INTRAVENOUS; SUBCUTANEOUS at 06:06

## 2022-09-01 RX ADMIN — CYANOCOBALAMIN TAB 1000 MCG 1000 MCG: 1000 TAB at 08:51

## 2022-09-01 RX ADMIN — ATORVASTATIN CALCIUM 80 MG: 40 TABLET, FILM COATED ORAL at 23:32

## 2022-09-01 RX ADMIN — ACETAMINOPHEN 650 MG: 325 TABLET ORAL at 14:53

## 2022-09-01 RX ADMIN — SODIUM CHLORIDE, PRESERVATIVE FREE 10 ML: 5 INJECTION INTRAVENOUS at 23:35

## 2022-09-01 RX ADMIN — LINEZOLID 600 MG: 600 TABLET, FILM COATED ORAL at 08:51

## 2022-09-01 RX ADMIN — ASPIRIN 81 MG 81 MG: 81 TABLET ORAL at 08:51

## 2022-09-01 RX ADMIN — Medication 5 MG: at 23:32

## 2022-09-01 RX ADMIN — GABAPENTIN 600 MG: 600 TABLET, FILM COATED ORAL at 08:51

## 2022-09-01 RX ADMIN — TAMSULOSIN HYDROCHLORIDE 0.8 MG: 0.4 CAPSULE ORAL at 08:51

## 2022-09-01 ASSESSMENT — PAIN SCALES - GENERAL
PAINLEVEL_OUTOF10: 3

## 2022-09-01 NOTE — PROGRESS NOTES
Progress Note    Admit Date: 8/30/2022  Day: 2  Diet: ADULT DIET; Regular; 4 carb choices (60 gm/meal); Low Fat/Low Chol/High Fiber/2 gm Na; 1000 ml  ADULT ORAL NUTRITION SUPPLEMENT; Breakfast; Wound Healing Oral Supplement    CC: leg pain    Interval history: Started on linezolid per ID. MRI of RLE completed. No overnight events. Call placed to [employer] [facility] to interrogate baclofen pump. HPI: 81-year-old man with a past medical history of GERD, congestive heart failure, neurogenic bladder with chronic Iglesias, UTI, A fib, history of lower extremity venous wounds presented with right lower extremity pain and swelling. The patient was getting wound care at his nursing home ([address] when his nurse told him that his foot looks infected and he needs to go to the ED as his wound looked infected. The patient also reports that he had a temperature at his nursing home with a T-max being 101.2. In the ED the patient had normal vitals, grossly normal CBC and BMP. His CRP was elevated at 25 and his ESR was elevated at 56. X-ray of the right lower extremity showed soft tissue swelling but no cortical bone loss or gas. The patient reports that he does recall pain in the right lower leg starting 1 to 2 days ago but beyond that is unable to provide much of a history. He denies chest pain, shortness of breath, abdominal pain, headache, sensory loss, motor weakness.     Medications:     Scheduled Meds:   cefTRIAXone (ROCEPHIN) IV  2,000 mg IntraVENous Q24H    linezolid  600 mg Oral 2 times per day    traZODone  100 mg Oral Nightly    sodium chloride flush  5-40 mL IntraVENous 2 times per day    aspirin  81 mg Oral Daily    atorvastatin  80 mg Oral Nightly    furosemide  40 mg Oral Daily    gabapentin  600 mg Oral BID    levothyroxine  75 mcg Oral Daily    melatonin  5 mg Oral Nightly    tamsulosin  0.8 mg Oral Daily    vitamin B-12  1,000 mcg Oral Daily    heparin (porcine)  5,000 Units SubCUTAneous 3 times per day Continuous Infusions:   sodium chloride       PRN Meds:sodium chloride flush, sodium chloride, ondansetron **OR** ondansetron, polyethylene glycol, acetaminophen **OR** acetaminophen    Objective:   Vitals:   T-max:  Patient Vitals for the past 8 hrs:   BP Temp Temp src Pulse Resp SpO2 Weight   09/01/22 0404 122/66 97.4 °F (36.3 °C) Oral 65 18 93 % 220 lb 14.4 oz (100.2 kg)         Intake/Output Summary (Last 24 hours) at 9/1/2022 4226  Last data filed at 9/1/2022 0405  Gross per 24 hour   Intake --   Output 1025 ml   Net -1025 ml       Review of Systems   Constitutional:  Negative for activity change and appetite change. HENT:  Negative for dental problem, drooling, ear discharge, sinus pressure, sore throat and tinnitus. Respiratory:  Negative for apnea, cough and shortness of breath. Cardiovascular:  Negative for chest pain and leg swelling. Gastrointestinal:  Negative for abdominal distention. Genitourinary:  Negative for difficulty urinating, dysuria, frequency, genital sores, hematuria and penile pain. Skin:  Negative for color change. Neurological:  Negative for speech difficulty, numbness and headaches. Psychiatric/Behavioral:  Negative for agitation, behavioral problems, confusion, self-injury and suicidal ideas. Physical Exam  Constitutional:       Appearance: Normal appearance. HENT:      Head: Normocephalic and atraumatic. Nose: Nose normal.      Mouth/Throat:      Mouth: Mucous membranes are moist.   Eyes:      Extraocular Movements: Extraocular movements intact. Pupils: Pupils are equal, round, and reactive to light. Cardiovascular:      Rate and Rhythm: Normal rate and regular rhythm. Heart sounds:     No gallop. Pulmonary:      Effort: No respiratory distress. Breath sounds: No wheezing or rales. Abdominal:      General: There is no distension. Palpations: There is no mass. Tenderness: There is no abdominal tenderness.  There is no rebound. Hernia: No hernia is present. Musculoskeletal:         General: Tenderness present. No swelling. Right lower leg: Edema present. Left lower leg: Edema present. Skin:     General: Skin is warm. Capillary Refill: Capillary refill takes less than 2 seconds. Neurological:      Mental Status: He is alert and oriented to person, place, and time. Cranial Nerves: No cranial nerve deficit. Sensory: No sensory deficit. Motor: No weakness. Gait: Gait normal.   Psychiatric:         Mood and Affect: Mood normal.         Behavior: Behavior normal.         LABS:    CBC:   Recent Labs     08/30/22  1446 08/31/22  0555 09/01/22  0406   WBC 9.6 9.5 8.1   HGB 11.5* 11.5* 10.4*   HCT 35.2* 35.2* 31.9*    178 173   MCV 94.9 94.0 94.3       Renal:    Recent Labs     08/30/22  1446 08/31/22  0554 09/01/22  0406    136 140   K 4.4 3.9 4.4    100 104   CO2 26 26 23   BUN 36* 38* 38*   CREATININE 1.2 1.0 0.9   GLUCOSE 106* 104* 99   CALCIUM 8.3 8.3 8.0*   MG  --  2.10 2.10   PHOS  --  3.7 3.9   ANIONGAP 12 10 13       Hepatic:   Recent Labs     08/31/22  0554 09/01/22  0406   LABALBU 3.1* 2.9*       Troponin: No results for input(s): TROPONINI in the last 72 hours. BNP: No results for input(s): BNP in the last 72 hours. Lipids: No results for input(s): CHOL, HDL in the last 72 hours. Invalid input(s): LDLCALCU, TRIGLYCERIDE  ABGs:  No results for input(s): PHART, MXA3IAV, PO2ART, FZS6FPV, BEART, THGBART, Y4FXXJMW, BKR7SXG in the last 72 hours. INR: No results for input(s): INR in the last 72 hours. Lactate: No results for input(s): LACTATE in the last 72 hours. Cultures:  -----------------------------------------------------------------  RAD:   MRI FOOT RIGHT WO CONTRAST   Final Result   1. Extremely limited study due to motion. 2. Findings suspicious for acute osteomyelitis involving the distal fifth metatarsal as well as the fifth proximal phalanx.    3. Diffuse subcutaneous edema which could reflect cellulitis or venous stasis. 4. Atrophy within the intrinsic musculature of the foot compatible with diabetic neuropathy. 5. Nonspecific marrow edema within the first proximal phalanx as well as the third middle phalanx. This could reflect infection versus reactive change. XR FOOT RIGHT (MIN 3 VIEWS)   Final Result   1. Suspected fracture of the second metatarsal shaft, incompletely assessed in this study. 2. Diffuse osteopenia in the tibia and fibula as well as the foot. Soft tissue swelling in the foot. 3. Incomplete assessment of the digits. XR TIBIA FIBULA RIGHT (2 VIEWS)   Final Result   1. Suspected fracture of the second metatarsal shaft, incompletely assessed in this study. 2. Diffuse osteopenia in the tibia and fibula as well as the foot. Soft tissue swelling in the foot. 3. Incomplete assessment of the digits. Assessment/Plan:     20-year-old man with a past medical history of GERD, congestive heart failure, neurogenic bladder with chronic Iglesias, UTI, A fib, history of lower extremity venous wounds presented with right lower extremity pain and swelling consistent with cellulitis. Cellulitis of right lower extremity  - The wound looks infected and he has a history of MRSA growing from his wound on a prior admission  - Blood cultures -   - Trend CRP and Pro-Calc  - Podiatry already consulted from the ED  --> f/u recs  -- ID consulted  --> rec starting linezolid and continuing ceftriaxone  -- MRI RLE  --> findings suspicious for OM of distal 5th metatarsal and 5th proximal phalanx     CAD s/p CABG in 2013  -continue home aspirin 81 mg daily  -continue lipitor 80 mg nightly    Neuropathy  - Has an intrathecal baclofen pump to alleviate muscle spasticity 2/2 neuropathy  - Cont.  At 725.8mcg/day as prescribed outpatient pending interrogation today by [employer] rep     Hypothyroidism   -continue home synthyroid 75 mcg BPH  -continue home flomax 0.8 mg PO daily   -chronic indwelling foleys catheter      Mood disorders   -continue home trazodone 25 mg PO nightly      Hx of Intertrigo likely 2/2 fungal rash  - Miconazole powder PRN to keep area dry     Code Status: Full code  FEN: Adult  PPX: SQH  DISPO: Merrick Dial MD, PGY-1  09/01/22  8:21 AM    This patient has been staffed and discussed with Ria Henderson MD.

## 2022-09-01 NOTE — PROGRESS NOTES
right lower LE . No pain with calf compression b/l. NEUROLOGIC: Gross and epicritic sensation is intact b/l. Protective sensation is diminished at all pedal sites b/l. DERMATOLOGIC: Diffuse dermatologic changes noted to b/l LE. Verbal consent was obtained prior to taking photos today:     Right lower extremity:               Hematoma noted to the lateral aspect of the right lower extremity that measures approximately 4cm x 5 cm. The lesion is fluctuant but without crepitus,  purulence, or malodor. Several small abrasions surrounding the dorsal aspect of the hematoma that are noted to be superficial without crepitus, fluctuance, purulence or malodor. They do not tunnel, track or probe to bone. Full-thickness ulceration noted to the lateral aspect of the right 5th metatarsal head. The ulceration measures 3 cm x 3 cm x 0.4 cm. There is no hyperkeratotic tissue present surround the ulceration. The wound base is fibrotic and necrotic with a portion that does probe to bone. There is no fluctuance or crepitus noted. No purulent drainage present. Several small dorsal bullae noted to the base of the 4th and 5th digits right lower extremity. The wound does not tunnel or track, but difficult to assess due to tenderness with exploration. Mild erythema noted to the plantar aspect of the right foot throughout the arch. Bulla plantar aspect of the right foot resolved with lancing yesterday. No residual fluctuance noted. Roof of bulla was not debrided and continues to cover the underlying skin layers. No crepitus , purulence, or malodor present. The lesion does not tunnel, track or probe to bone. Left lower extremity:      Scattered abrasions noted to the anterior aspect of the left leg. Incision site on the lateral aspect of the left foot skin edges noted to be well coapted. Evidence of deep tissue injury noted to the posterior calcaneus left lower extremity. No fluctuance, crepitus, or malodor noted. No tunneling or tracking noted. No active drainage noted. No acute signs of infection noted. MUSCULOSKELETAL: Muscle strength is 4/5 for all pedal groups tested. No pain with palpation of the foot or ankle b/l. Ankle joint ROM is decreased in dorsiflexion with the knee extended. 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 Imaging:    XRAY Foot Right  Narrative   4 views of the right tibia/fibula, 2 views of the right foot       HISTORY: Pain. FINDINGS:       In the right tibia/fibula. There is osteopenia. No definite periosteal bone formation or cortical destruction is seen. There are vascular calcifications. Within the foot, there is diffuse osteopenia. The digits are not well assessed on this examination. There appears to be a fracture in the second metatarsal shaft, incompletely assessed on this exam. Soft tissue swelling is present throughout the foot. Impression   1. Suspected fracture of the second metatarsal shaft, incompletely assessed in this study. 2. Diffuse osteopenia in the tibia and fibula as well as the foot. Soft tissue swelling in the foot. 3. Incomplete assessment of the digits. MRI R foot   Narrative   MRI right foot without contrast       HISTORY: Osteomyelitis. Ulceration. COMPARISON: August 30, 2022. FINDINGS:       The study is significantly limited due to patient motion. A skin ulcer is visible overlying the region of the fifth metatarsal head. There is marked marrow edema with corresponding T1 signal hypointensity in the distal aspect of the fifth metatarsal as well as within the fifth proximal phalanx. No definite    loculated fluid collection identified. There is nonspecific marrow edema within the first proximal phalanx as well as third middle phalanx. There is diffuse subcutaneous edema within the foot. There is atrophy within the intrinsic musculature of the foot as well. No definite infectious tenosynovitis.        Impression 1. Extremely limited study due to motion. 2. Findings suspicious for acute osteomyelitis involving the distal fifth metatarsal as well as the fifth proximal phalanx. 3. Diffuse subcutaneous edema which could reflect cellulitis or venous stasis. 4. Atrophy within the intrinsic musculature of the foot compatible with diabetic neuropathy. 5. Nonspecific marrow edema within the first proximal phalanx as well as the third middle phalanx. This could reflect infection versus reactive change. Laser skin perfusion study 5/27/22 right Impression:  Pulse volume recording at the level of the foot reveals waveforms within normal limits. Laser skin perfusion pressure study at the transmetatarsal level ([place]) is 148 mmHg; wound healing likely. This is considered  to have a good probability for healing. Laser skin perfusion pressure study at the transmetatarsal level (Medial plantar) is 112 mmHg; wound healing likely. This is  considered to have a good probability for healing. Arterial Duplex right 1/19/22: The right ankle/brachial index is 1.5 (the DP is 120 and the PT is 168 mmHg). The common femoral artery demonstrates multiphasic flow indicating no significant aorto-iliac inflow disease. Elevated velocities at the mid popliteal artery indicate a > 50% stenosis by velocity criteria (velocity went from 79 to 197 cm/s). The peroneal artery could not be visualized (possibly due to extensive edema). Abnormal monophasic Doppler waveforms were noted in the tibial arteries at the ankle. There were no previous studies to use for comparison. IMPRESSION/RECOMMENDATIONS:    - OM distal 5th metatarsal and proximal 5th phalanx right lower extremity.    - Cellulitis right lower extremity  - Full thickness ulcerations, lateral right 5th digit   - Hematoma right lower extremity-improving  - S/p left foot partial 5th ray resection with graft application (DOS 10/74/8168)  - Superficial abrasions, bilateral lower Osteomyelitis 5th metatarsal head and 5th proximal phalanx right lower extremity. ID following;Continue IV vancomycin and ceftriaxone. Surgical intervention likely necessary at this time due to osteomyelitis of the right fifth metatarsal head and right fifth proximal phalanx. Surgery will be planned for tomorrow pending OR availability. Assessment and plan will be discussed with Dr. Jonny Le, [address] [place] [address], ANGELINA   Podiatric Resident PGY1  Pager [phone] or [employer]  9/1/2022, 1:38 PM     Patient was seen and evaluated at bedside. Agree with residents assessment and treatment plan.   Jonny Le DPM

## 2022-09-01 NOTE — PERIOP NOTE
Pre-Operative Risk assessment using 2014 ACC/AHA guidelines     Emergent procedure Yes  Active Cardiac Condition No (decompensated HF, Arrhythmia, MI <3 weeks, severe valve disease)  Risk Level of Procedure Intermediate Risk (intraperitoneal, intrathoracic, HENT, orthopedic, or carotid endarterectomy, etc.)  Revised Cardiac Risk Index Risk factors: History of ischemic heart disease  Measurement of Exercise Tolerance before Surgery >4 No- bedbound    According to the 2014 ACC/AHA pre-operative risk assessment guidelines Robbie Martinez is a low risk for major cardiac complications during a intermediate risk procedure and may continue as planned. Specific medication recommendations are listed below. Medications recommended to continue should be taken with a sip of water even when NPO. Further recommendations from consultants: None    Medication Recommendations:  Ok to proceed w/ surgery from a medical stanpoint w/o further cardiac w/u.     Yonis Clark MD

## 2022-09-01 NOTE — PROGRESS NOTES
Representatives from [facility] were on site and stated pt will discharge from hospital to [facility].

## 2022-09-01 NOTE — PROGRESS NOTES
ID Follow-up NOTE    CC:   R LE cellulitis, LE wounds  Antibiotics: Linezolid, Ceftriaxone     Admit Date: 8/30/2022  Hospital Day: 3    Subjective:     Patient reports no R foot pain      Objective:     Patient Vitals for the past 8 hrs:   BP Temp Temp src Pulse Resp SpO2   09/01/22 0830 118/71 97.5 °F (36.4 °C) Oral 60 16 93 %     I/O last 3 completed shifts: In: 48 [IV Piggyback:50]  Out: 2025 [Urine:2025]  No intake/output data recorded. EXAM:  GENERAL: No apparent distress.     HEENT: Membranes moist, no oral lesion  NECK:  Supple, no lymphadenopathy  LUNGS: Clear b/l, no rales, no dullness  CARDIAC: RRR, no murmur appreciated  ABD:  + BS, soft / NT  EXT:  Bilateral LE dressing / ACE  NEURO: No focal neurologic findings  PSYCH: Orientation, sensorium, mood normal  LINES:  Peripheral iv       Data Review:  Lab Results   Component Value Date    WBC 8.1 09/01/2022    HGB 10.4 (L) 09/01/2022    HCT 31.9 (L) 09/01/2022    MCV 94.3 09/01/2022     09/01/2022     Lab Results   Component Value Date    CREATININE 0.9 09/01/2022    BUN 38 (H) 09/01/2022     09/01/2022    K 4.4 09/01/2022     09/01/2022    CO2 23 09/01/2022       Hepatic Function Panel:   Lab Results   Component Value Date/Time    ALKPHOS 45 04/22/2022 04:37 PM    ALT 25 04/22/2022 04:37 PM    AST 20 04/22/2022 04:37 PM    PROT 6.3 04/22/2022 04:37 PM    BILITOT <0.2 04/22/2022 04:37 PM    BILIDIR <0.2 06/20/2021 02:16 AM    IBILI see below 06/20/2021 02:16 AM    LABALBU 2.9 09/01/2022 04:06 AM       MICRO:  8/30 BC x 2 no growth to date    8/30 R foot wound - heavy S aureus / MRSA, light K pneumoniae, light K aerogenes  Staph aureus mrsa   Antibiotic Interpretation Microscan    ceFAZolin Resistant 8 mcg/mL   clindamycin Sensitive <=0.5 mcg/mL   erythromycin Resistant >4 mcg/mL   oxacillin Resistant >2 mcg/mL   tetracycline Sensitive <=4 mcg/mL   trimethoprim-sulfamethoxazole Sensitive <=0.5/9.5 mcg/mL   vancomycin Sensitive 1 mcg/mL IMAGIN/30 R foot x-ray   1. Suspected fracture of the second metatarsal shaft, incompletely assessed in this study. 2. Diffuse osteopenia in the tibia and fibula as well as the foot. Soft tissue swelling in the foot. 3. Incomplete assessment of the digits.  R foot MRI  1. Extremely limited study due to motion. 2. Findings suspicious for acute osteomyelitis involving the distal fifth metatarsal as well as the fifth proximal phalanx. 3. Diffuse subcutaneous edema which could reflect cellulitis or venous stasis. 4. Atrophy within the intrinsic musculature of the foot compatible with diabetic neuropathy. 5. Nonspecific marrow edema within the first proximal phalanx as well as the third middle phalanx. This could reflect infection versus reactive change.     Scheduled Meds:   cefTRIAXone (ROCEPHIN) IV  2,000 mg IntraVENous Q24H    linezolid  600 mg Oral 2 times per day    traZODone  100 mg Oral Nightly    sodium chloride flush  5-40 mL IntraVENous 2 times per day    aspirin  81 mg Oral Daily    atorvastatin  80 mg Oral Nightly    furosemide  40 mg Oral Daily    gabapentin  600 mg Oral BID    levothyroxine  75 mcg Oral Daily    melatonin  5 mg Oral Nightly    tamsulosin  0.8 mg Oral Daily    vitamin B-12  1,000 mcg Oral Daily    heparin (porcine)  5,000 Units SubCUTAneous 3 times per day       Continuous Infusions:   sodium chloride         PRN Meds:  sodium chloride flush, sodium chloride, ondansetron **OR** ondansetron, polyethylene glycol, acetaminophen **OR** acetaminophen      Assessment:     Hx AF, JARAD, CHF, neurogenic bladder with chronic schneider  Hx LE venous wounds  Hx mult admissions     Hx R 5th partial ray resection 22, Stravix graft placed     R LE cellulitis  R distal 5th MT and prox phalanx OM on MRI ('suspicous' / continuous with wound)  LE wounds    Plan:     Cont Linezolid + Ceftriaxone  Will f/u on cult - asked Micro to preform sensitivity testing on Klebsiella isolates  Wound care per Podiatry  Surgical intervention per Podiatry - plan for R partial ray resection      Medical Decision Making:   The following items were considered in medical decision making:  Discussion of patient care with other providers  Reviewed clinical lab tests  Reviewed radiology tests  Reviewed other diagnostic tests/interventions  Microbiology cultures and other micro tests reviewed      Discussed with pt, Podiatry Resident   Gilles Markham MD

## 2022-09-01 NOTE — PLAN OF CARE
Discussed surgical intervention with patient at bedside. All potential risks, benefits, and complications were discussed with the patient prior to the scheduling of surgery. No guarantees or promises were made to what the outcomes will be. The patient wished to proceed with surgery, and informed written consent was obtained, signed, and placed on the patient's chart.    - EKG will be reviewed  - Chest xray will be reviewed  - PT/INR pending with morning labs  - NPO at midnight, sips of water with meds ok  - Heparin to be held with morning dose    Adarsh Lin DPM   Podiatric Resident PGY1  Pager [phone] or [employer]  9/1/2022, 4:23 PM

## 2022-09-01 NOTE — PROGRESS NOTES
Pt resting with no complaints at this time. RR easy and unlabored. Bed locked and in lowest position. BLE dressings CDI, elevated on pillows and prevlon boots. Iglesias remains in place. Pt updated on care plan/status. Will continue to monitor.

## 2022-09-01 NOTE — CARE COORDINATION
CM following: CM spoke with Harvinder Evansd at [facility] who stated she would come to the hospital to speak with the patient today to discuss discharging to a LTC facility. Harvinder Evansd spoke with patient and informed CM that patient will discharge to LTC at [facility]. Electronically signed by ARIC Steel on 9/1/2022 at 1:30 PM  [phone]    UPDATE: CM met with patient at bedside this PM. CM reviewed discharge plan of discharge to [facility]. Patient in agreement with this plan and expects a visit from Deepali Duran from [facility] tomorrow. CM spoke with Deepali Duran [phone], informing her of patient's planned surgery tomorrow and patient's request that she stop by early in the morning to meet him. Deepali Duran will try to stop by around 10 am tomorrow. The above number is the best to reach Deepali Duran as patient moves towards discharging to [facility].   Electronically signed by ARIC Steel on 9/1/2022 at 4:50 PM  [phone]

## 2022-09-01 NOTE — PROGRESS NOTES
Pt returned from MRI. Gown changed. IV abx started. No requests at this time. Will continue to monitor.

## 2022-09-01 NOTE — PLAN OF CARE
Problem: Pain  Goal: Verbalizes/displays adequate comfort level or baseline comfort level  Outcome: Progressing  Flowsheets (Taken 9/1/2022 1424)  Verbalizes/displays adequate comfort level or baseline comfort level:   Encourage patient to monitor pain and request assistance   Assess pain using appropriate pain scale   Administer analgesics based on type and severity of pain and evaluate response   Implement non-pharmacological measures as appropriate and evaluate response  Note: Reports pain management effective to reduce pain. Problem: Safety - Adult  Goal: Free from fall injury  Flowsheets (Taken 9/1/2022 1424)  Free From Fall Injury: Instruct family/caregiver on patient safety  Note: Agrees to use call light to make needs known.

## 2022-09-02 ENCOUNTER — APPOINTMENT (OUTPATIENT)
Dept: GENERAL RADIOLOGY | Age: 82
DRG: 475 | End: 2022-09-02
Payer: MEDICARE

## 2022-09-02 ENCOUNTER — ANESTHESIA (OUTPATIENT)
Dept: OPERATING ROOM | Age: 82
DRG: 475 | End: 2022-09-02
Payer: MEDICARE

## 2022-09-02 ENCOUNTER — ANESTHESIA EVENT (OUTPATIENT)
Dept: OPERATING ROOM | Age: 82
DRG: 475 | End: 2022-09-02
Payer: MEDICARE

## 2022-09-02 LAB
ABO/RH: NORMAL
ALBUMIN SERPL-MCNC: 3 G/DL (ref 3.4–5)
ANION GAP SERPL CALCULATED.3IONS-SCNC: 10 MMOL/L (ref 3–16)
ANTIBODY SCREEN: NORMAL
BASOPHILS ABSOLUTE: 0.1 K/UL (ref 0–0.2)
BASOPHILS RELATIVE PERCENT: 0.9 %
BUN BLDV-MCNC: 41 MG/DL (ref 7–20)
C-REACTIVE PROTEIN: 24.9 MG/L (ref 0–5.1)
CALCIUM SERPL-MCNC: 8.3 MG/DL (ref 8.3–10.6)
CHLORIDE BLD-SCNC: 102 MMOL/L (ref 99–110)
CO2: 26 MMOL/L (ref 21–32)
CREAT SERPL-MCNC: 1.1 MG/DL (ref 0.8–1.3)
EKG ATRIAL RATE: 61 BPM
EKG DIAGNOSIS: NORMAL
EKG P AXIS: 26 DEGREES
EKG P-R INTERVAL: 230 MS
EKG Q-T INTERVAL: 434 MS
EKG QRS DURATION: 96 MS
EKG QTC CALCULATION (BAZETT): 436 MS
EKG R AXIS: -22 DEGREES
EKG T AXIS: 24 DEGREES
EKG VENTRICULAR RATE: 61 BPM
EOSINOPHILS ABSOLUTE: 1.1 K/UL (ref 0–0.6)
EOSINOPHILS RELATIVE PERCENT: 15.5 %
ESTIMATED AVERAGE GLUCOSE: 108.3 MG/DL
GFR AFRICAN AMERICAN: >60
GFR NON-AFRICAN AMERICAN: >60
GLUCOSE BLD-MCNC: 95 MG/DL (ref 70–99)
HBA1C MFR BLD: 5.4 %
HCT VFR BLD CALC: 32.3 % (ref 40.5–52.5)
HCT VFR BLD CALC: 36.1 % (ref 40.5–52.5)
HEMOGLOBIN: 10.6 G/DL (ref 13.5–17.5)
HEMOGLOBIN: 11.5 G/DL (ref 13.5–17.5)
INR BLD: 1.12 (ref 0.87–1.14)
LYMPHOCYTES ABSOLUTE: 1.3 K/UL (ref 1–5.1)
LYMPHOCYTES RELATIVE PERCENT: 18.2 %
MAGNESIUM: 2.1 MG/DL (ref 1.8–2.4)
MCH RBC QN AUTO: 31 PG (ref 26–34)
MCHC RBC AUTO-ENTMCNC: 32.8 G/DL (ref 31–36)
MCV RBC AUTO: 94.5 FL (ref 80–100)
MONOCYTES ABSOLUTE: 0.5 K/UL (ref 0–1.3)
MONOCYTES RELATIVE PERCENT: 7 %
NEUTROPHILS ABSOLUTE: 4.3 K/UL (ref 1.7–7.7)
NEUTROPHILS RELATIVE PERCENT: 58.4 %
OCCULT BLOOD DIAGNOSTIC: ABNORMAL
PDW BLD-RTO: 15.4 % (ref 12.4–15.4)
PHOSPHORUS: 4.5 MG/DL (ref 2.5–4.9)
PLATELET # BLD: 173 K/UL (ref 135–450)
PMV BLD AUTO: 8.7 FL (ref 5–10.5)
POTASSIUM SERPL-SCNC: 4.4 MMOL/L (ref 3.5–5.1)
PROCALCITONIN: 0.11 NG/ML (ref 0–0.15)
PROTHROMBIN TIME: 14.3 SEC (ref 11.7–14.5)
RBC # BLD: 3.42 M/UL (ref 4.2–5.9)
SODIUM BLD-SCNC: 138 MMOL/L (ref 136–145)
WBC # BLD: 7.3 K/UL (ref 4–11)

## 2022-09-02 PROCEDURE — 85014 HEMATOCRIT: CPT

## 2022-09-02 PROCEDURE — 84145 PROCALCITONIN (PCT): CPT

## 2022-09-02 PROCEDURE — 6360000002 HC RX W HCPCS: Performed by: INTERNAL MEDICINE

## 2022-09-02 PROCEDURE — 88305 TISSUE EXAM BY PATHOLOGIST: CPT

## 2022-09-02 PROCEDURE — 86140 C-REACTIVE PROTEIN: CPT

## 2022-09-02 PROCEDURE — A4217 STERILE WATER/SALINE, 500 ML: HCPCS | Performed by: PODIATRIST

## 2022-09-02 PROCEDURE — 2709999900 HC NON-CHARGEABLE SUPPLY: Performed by: PODIATRIST

## 2022-09-02 PROCEDURE — 86850 RBC ANTIBODY SCREEN: CPT

## 2022-09-02 PROCEDURE — 73630 X-RAY EXAM OF FOOT: CPT

## 2022-09-02 PROCEDURE — 3700000001 HC ADD 15 MINUTES (ANESTHESIA): Performed by: PODIATRIST

## 2022-09-02 PROCEDURE — 85025 COMPLETE CBC W/AUTO DIFF WBC: CPT

## 2022-09-02 PROCEDURE — 6370000000 HC RX 637 (ALT 250 FOR IP)

## 2022-09-02 PROCEDURE — 82270 OCCULT BLOOD FECES: CPT

## 2022-09-02 PROCEDURE — 6370000000 HC RX 637 (ALT 250 FOR IP): Performed by: PODIATRIST

## 2022-09-02 PROCEDURE — 6360000002 HC RX W HCPCS: Performed by: NURSE ANESTHETIST, CERTIFIED REGISTERED

## 2022-09-02 PROCEDURE — 2580000003 HC RX 258

## 2022-09-02 PROCEDURE — 3600000012 HC SURGERY LEVEL 2 ADDTL 15MIN: Performed by: PODIATRIST

## 2022-09-02 PROCEDURE — 88311 DECALCIFY TISSUE: CPT

## 2022-09-02 PROCEDURE — 99232 SBSQ HOSP IP/OBS MODERATE 35: CPT | Performed by: INTERNAL MEDICINE

## 2022-09-02 PROCEDURE — 7100000000 HC PACU RECOVERY - FIRST 15 MIN: Performed by: PODIATRIST

## 2022-09-02 PROCEDURE — 2500000003 HC RX 250 WO HCPCS: Performed by: PODIATRIST

## 2022-09-02 PROCEDURE — 0QBN0ZZ EXCISION OF RIGHT METATARSAL, OPEN APPROACH: ICD-10-PCS | Performed by: PODIATRIST

## 2022-09-02 PROCEDURE — C9113 INJ PANTOPRAZOLE SODIUM, VIA: HCPCS | Performed by: INTERNAL MEDICINE

## 2022-09-02 PROCEDURE — 36415 COLL VENOUS BLD VENIPUNCTURE: CPT

## 2022-09-02 PROCEDURE — 86901 BLOOD TYPING SEROLOGIC RH(D): CPT

## 2022-09-02 PROCEDURE — 2580000003 HC RX 258: Performed by: PODIATRIST

## 2022-09-02 PROCEDURE — 3600000002 HC SURGERY LEVEL 2 BASE: Performed by: PODIATRIST

## 2022-09-02 PROCEDURE — 85610 PROTHROMBIN TIME: CPT

## 2022-09-02 PROCEDURE — 2500000003 HC RX 250 WO HCPCS: Performed by: NURSE ANESTHETIST, CERTIFIED REGISTERED

## 2022-09-02 PROCEDURE — 6370000000 HC RX 637 (ALT 250 FOR IP): Performed by: INTERNAL MEDICINE

## 2022-09-02 PROCEDURE — 1200000000 HC SEMI PRIVATE

## 2022-09-02 PROCEDURE — 80069 RENAL FUNCTION PANEL: CPT

## 2022-09-02 PROCEDURE — 6370000000 HC RX 637 (ALT 250 FOR IP): Performed by: STUDENT IN AN ORGANIZED HEALTH CARE EDUCATION/TRAINING PROGRAM

## 2022-09-02 PROCEDURE — 85018 HEMOGLOBIN: CPT

## 2022-09-02 PROCEDURE — 93010 ELECTROCARDIOGRAM REPORT: CPT | Performed by: INTERNAL MEDICINE

## 2022-09-02 PROCEDURE — 0Y6M0ZF DETACHMENT AT RIGHT FOOT, PARTIAL 5TH RAY, OPEN APPROACH: ICD-10-PCS | Performed by: PODIATRIST

## 2022-09-02 PROCEDURE — 6360000002 HC RX W HCPCS

## 2022-09-02 PROCEDURE — 83735 ASSAY OF MAGNESIUM: CPT

## 2022-09-02 PROCEDURE — 3700000000 HC ANESTHESIA ATTENDED CARE: Performed by: PODIATRIST

## 2022-09-02 PROCEDURE — 2580000003 HC RX 258: Performed by: STUDENT IN AN ORGANIZED HEALTH CARE EDUCATION/TRAINING PROGRAM

## 2022-09-02 PROCEDURE — 7100000001 HC PACU RECOVERY - ADDTL 15 MIN: Performed by: PODIATRIST

## 2022-09-02 PROCEDURE — 86900 BLOOD TYPING SEROLOGIC ABO: CPT

## 2022-09-02 RX ORDER — OXYCODONE HYDROCHLORIDE 5 MG/1
5 TABLET ORAL PRN
Status: DISCONTINUED | OUTPATIENT
Start: 2022-09-02 | End: 2022-09-02 | Stop reason: HOSPADM

## 2022-09-02 RX ORDER — EPHEDRINE SULFATE 50 MG/ML
INJECTION INTRAVENOUS PRN
Status: DISCONTINUED | OUTPATIENT
Start: 2022-09-02 | End: 2022-09-02 | Stop reason: SDUPTHER

## 2022-09-02 RX ORDER — POVIDONE-IODINE 10 MG/G
OINTMENT TOPICAL PRN
Status: DISCONTINUED | OUTPATIENT
Start: 2022-09-02 | End: 2022-09-02 | Stop reason: ALTCHOICE

## 2022-09-02 RX ORDER — BUPIVACAINE HYDROCHLORIDE 5 MG/ML
INJECTION, SOLUTION EPIDURAL; INTRACAUDAL PRN
Status: DISCONTINUED | OUTPATIENT
Start: 2022-09-02 | End: 2022-09-02 | Stop reason: ALTCHOICE

## 2022-09-02 RX ORDER — OXYCODONE HYDROCHLORIDE 5 MG/1
10 TABLET ORAL PRN
Status: DISCONTINUED | OUTPATIENT
Start: 2022-09-02 | End: 2022-09-02 | Stop reason: HOSPADM

## 2022-09-02 RX ORDER — PANTOPRAZOLE SODIUM 40 MG/10ML
40 INJECTION, POWDER, LYOPHILIZED, FOR SOLUTION INTRAVENOUS DAILY
Status: DISCONTINUED | OUTPATIENT
Start: 2022-09-02 | End: 2022-09-03

## 2022-09-02 RX ORDER — SODIUM CHLORIDE 0.9 % (FLUSH) 0.9 %
5-40 SYRINGE (ML) INJECTION EVERY 12 HOURS SCHEDULED
Status: DISCONTINUED | OUTPATIENT
Start: 2022-09-02 | End: 2022-09-02 | Stop reason: HOSPADM

## 2022-09-02 RX ORDER — SODIUM CHLORIDE 9 MG/ML
25 INJECTION, SOLUTION INTRAVENOUS PRN
Status: DISCONTINUED | OUTPATIENT
Start: 2022-09-02 | End: 2022-09-02 | Stop reason: HOSPADM

## 2022-09-02 RX ORDER — DIPHENHYDRAMINE HYDROCHLORIDE 50 MG/ML
12.5 INJECTION INTRAMUSCULAR; INTRAVENOUS
Status: DISCONTINUED | OUTPATIENT
Start: 2022-09-02 | End: 2022-09-02 | Stop reason: HOSPADM

## 2022-09-02 RX ORDER — LIDOCAINE HYDROCHLORIDE 20 MG/ML
INJECTION, SOLUTION INFILTRATION; PERINEURAL PRN
Status: DISCONTINUED | OUTPATIENT
Start: 2022-09-02 | End: 2022-09-02 | Stop reason: HOSPADM

## 2022-09-02 RX ORDER — ASPIRIN 81 MG/1
81 TABLET, CHEWABLE ORAL DAILY
Status: DISCONTINUED | OUTPATIENT
Start: 2022-09-03 | End: 2022-09-03

## 2022-09-02 RX ORDER — FENTANYL CITRATE 50 UG/ML
INJECTION, SOLUTION INTRAMUSCULAR; INTRAVENOUS PRN
Status: DISCONTINUED | OUTPATIENT
Start: 2022-09-02 | End: 2022-09-02 | Stop reason: SDUPTHER

## 2022-09-02 RX ORDER — HEPARIN SODIUM 5000 [USP'U]/ML
5000 INJECTION, SOLUTION INTRAVENOUS; SUBCUTANEOUS EVERY 8 HOURS SCHEDULED
Status: DISCONTINUED | OUTPATIENT
Start: 2022-09-03 | End: 2022-09-07 | Stop reason: HOSPADM

## 2022-09-02 RX ORDER — LIDOCAINE HYDROCHLORIDE 20 MG/ML
INJECTION, SOLUTION INTRAVENOUS PRN
Status: DISCONTINUED | OUTPATIENT
Start: 2022-09-02 | End: 2022-09-02 | Stop reason: SDUPTHER

## 2022-09-02 RX ORDER — LABETALOL HYDROCHLORIDE 5 MG/ML
10 INJECTION, SOLUTION INTRAVENOUS
Status: DISCONTINUED | OUTPATIENT
Start: 2022-09-02 | End: 2022-09-02 | Stop reason: HOSPADM

## 2022-09-02 RX ORDER — HYDRALAZINE HYDROCHLORIDE 20 MG/ML
10 INJECTION INTRAMUSCULAR; INTRAVENOUS
Status: DISCONTINUED | OUTPATIENT
Start: 2022-09-02 | End: 2022-09-02 | Stop reason: HOSPADM

## 2022-09-02 RX ORDER — METOCLOPRAMIDE HYDROCHLORIDE 5 MG/ML
10 INJECTION INTRAMUSCULAR; INTRAVENOUS
Status: DISCONTINUED | OUTPATIENT
Start: 2022-09-02 | End: 2022-09-02 | Stop reason: HOSPADM

## 2022-09-02 RX ORDER — PROPOFOL 10 MG/ML
INJECTION, EMULSION INTRAVENOUS CONTINUOUS PRN
Status: DISCONTINUED | OUTPATIENT
Start: 2022-09-02 | End: 2022-09-02 | Stop reason: SDUPTHER

## 2022-09-02 RX ORDER — MEPERIDINE HYDROCHLORIDE 25 MG/ML
12.5 INJECTION INTRAMUSCULAR; INTRAVENOUS; SUBCUTANEOUS EVERY 5 MIN PRN
Status: DISCONTINUED | OUTPATIENT
Start: 2022-09-02 | End: 2022-09-02 | Stop reason: HOSPADM

## 2022-09-02 RX ORDER — MAGNESIUM HYDROXIDE 1200 MG/15ML
LIQUID ORAL CONTINUOUS PRN
Status: DISCONTINUED | OUTPATIENT
Start: 2022-09-02 | End: 2022-09-02 | Stop reason: HOSPADM

## 2022-09-02 RX ORDER — SODIUM CHLORIDE 0.9 % (FLUSH) 0.9 %
5-40 SYRINGE (ML) INJECTION PRN
Status: DISCONTINUED | OUTPATIENT
Start: 2022-09-02 | End: 2022-09-02 | Stop reason: HOSPADM

## 2022-09-02 RX ADMIN — ACETAMINOPHEN 650 MG: 325 TABLET ORAL at 23:47

## 2022-09-02 RX ADMIN — ATORVASTATIN CALCIUM 80 MG: 40 TABLET, FILM COATED ORAL at 23:43

## 2022-09-02 RX ADMIN — SODIUM CHLORIDE, PRESERVATIVE FREE 10 ML: 5 INJECTION INTRAVENOUS at 15:09

## 2022-09-02 RX ADMIN — LIDOCAINE HYDROCHLORIDE 100 MG: 20 INJECTION, SOLUTION INTRAVENOUS at 17:29

## 2022-09-02 RX ADMIN — EPHEDRINE SULFATE 10 MG: 50 INJECTION INTRAVENOUS at 17:38

## 2022-09-02 RX ADMIN — FUROSEMIDE 40 MG: 40 TABLET ORAL at 10:14

## 2022-09-02 RX ADMIN — LINEZOLID 600 MG: 600 TABLET, FILM COATED ORAL at 23:44

## 2022-09-02 RX ADMIN — FENTANYL CITRATE 25 MCG: 50 INJECTION, SOLUTION INTRAMUSCULAR; INTRAVENOUS at 17:29

## 2022-09-02 RX ADMIN — PHENYLEPHRINE HYDROCHLORIDE 100 MCG: 10 INJECTION, SOLUTION INTRAMUSCULAR; INTRAVENOUS; SUBCUTANEOUS at 18:40

## 2022-09-02 RX ADMIN — TAMSULOSIN HYDROCHLORIDE 0.8 MG: 0.4 CAPSULE ORAL at 10:14

## 2022-09-02 RX ADMIN — GABAPENTIN 600 MG: 600 TABLET, FILM COATED ORAL at 23:44

## 2022-09-02 RX ADMIN — TRAZODONE HYDROCHLORIDE 100 MG: 100 TABLET ORAL at 23:43

## 2022-09-02 RX ADMIN — FENTANYL CITRATE 25 MCG: 50 INJECTION, SOLUTION INTRAMUSCULAR; INTRAVENOUS at 18:30

## 2022-09-02 RX ADMIN — LINEZOLID 600 MG: 600 TABLET, FILM COATED ORAL at 10:14

## 2022-09-02 RX ADMIN — GABAPENTIN 600 MG: 600 TABLET, FILM COATED ORAL at 10:14

## 2022-09-02 RX ADMIN — CEFTRIAXONE 2000 MG: 2 INJECTION, POWDER, FOR SOLUTION INTRAMUSCULAR; INTRAVENOUS at 23:49

## 2022-09-02 RX ADMIN — PHENYLEPHRINE HYDROCHLORIDE 100 MCG: 10 INJECTION, SOLUTION INTRAMUSCULAR; INTRAVENOUS; SUBCUTANEOUS at 17:59

## 2022-09-02 RX ADMIN — SODIUM CHLORIDE, PRESERVATIVE FREE 10 ML: 5 INJECTION INTRAVENOUS at 23:44

## 2022-09-02 RX ADMIN — CYANOCOBALAMIN TAB 1000 MCG 1000 MCG: 1000 TAB at 10:14

## 2022-09-02 RX ADMIN — SODIUM CHLORIDE, PRESERVATIVE FREE 10 ML: 5 INJECTION INTRAVENOUS at 10:15

## 2022-09-02 RX ADMIN — EPHEDRINE SULFATE 10 MG: 50 INJECTION INTRAVENOUS at 17:47

## 2022-09-02 RX ADMIN — PROPOFOL 100 MCG/KG/MIN: 10 INJECTION, EMULSION INTRAVENOUS at 17:32

## 2022-09-02 RX ADMIN — LEVOTHYROXINE SODIUM 75 MCG: 0.07 TABLET ORAL at 05:50

## 2022-09-02 RX ADMIN — PANTOPRAZOLE SODIUM 40 MG: 40 INJECTION, POWDER, FOR SOLUTION INTRAVENOUS at 15:09

## 2022-09-02 RX ADMIN — PHENYLEPHRINE HYDROCHLORIDE 100 MCG: 10 INJECTION, SOLUTION INTRAMUSCULAR; INTRAVENOUS; SUBCUTANEOUS at 18:10

## 2022-09-02 RX ADMIN — Medication 5 MG: at 23:43

## 2022-09-02 ASSESSMENT — PAIN DESCRIPTION - ORIENTATION: ORIENTATION: RIGHT

## 2022-09-02 ASSESSMENT — PAIN SCALES - GENERAL
PAINLEVEL_OUTOF10: 3
PAINLEVEL_OUTOF10: 0
PAINLEVEL_OUTOF10: 4
PAINLEVEL_OUTOF10: 0

## 2022-09-02 ASSESSMENT — PAIN DESCRIPTION - PAIN TYPE: TYPE: ACUTE PAIN

## 2022-09-02 ASSESSMENT — ENCOUNTER SYMPTOMS: SHORTNESS OF BREATH: 1

## 2022-09-02 ASSESSMENT — PAIN DESCRIPTION - LOCATION: LOCATION: FOOT

## 2022-09-02 NOTE — PLAN OF CARE
Problem: Skin/Tissue Integrity  Goal: Absence of new skin breakdown  Description: 1. Monitor for areas of redness and/or skin breakdown  2. Assess vascular access sites hourly  3. Every 4-6 hours minimum:  Change oxygen saturation probe site  4. Every 4-6 hours:  If on nasal continuous positive airway pressure, respiratory therapy assess nares and determine need for appliance change or resting period.   9/2/2022 1158 by Mike Olivier RN  Outcome: Progressing  9/2/2022 0557 by Juan Madrigal RN  Outcome: Progressing     Problem: Chronic Conditions and Co-morbidities  Goal: Patient's chronic conditions and co-morbidity symptoms are monitored and maintained or improved  9/2/2022 1158 by Mike Olivier RN       Problem: Discharge Planning  Goal: Discharge to home or other facility with appropriate resources  9/2/2022 1158 by Mike Olivier RN  Outcome: Progressing    Problem: Pain  Goal: Verbalizes/displays adequate comfort level or baseline comfort level  9/2/2022 1158 by Mike Olivier RN  Outcome: Progressing       Problem: Safety - Adult  Goal: Free from fall injury  9/2/2022 1158 by Mike Olivier RN  Outcome: Progressing

## 2022-09-02 NOTE — ANESTHESIA PRE PROCEDURE
Department of Anesthesiology  Preprocedure Note       Name:  Timothy Josue   Age:  81 y.o.  :  1940                                          MRN:  [id number]         Date:  2022      Surgeon: Tamiko Perez):  Patrice ANGELINA    Procedure: Procedure(s):  INCISION AND DRAINAGE PARTIAL 5TH RAY RESECTION RIGHT FOOT  . Medications prior to admission:   Prior to Admission medications    Medication Sig Start Date End Date Taking? Authorizing Provider   acetaminophen (TYLENOL) 325 MG tablet Take 650 mg by mouth every 6 hours as needed for Pain   Yes Historical Provider, MD   docusate sodium (COLACE) 100 MG capsule Take 100 mg by mouth every morning (before breakfast)   Yes Historical Provider, MD   ferrous sulfate (IRON 325) 325 (65 Fe) MG tablet Take 325 mg by mouth in the morning and at bedtime   Yes Historical Provider, MD   miconazole (MICOTIN) 2 % cream Apply topically 2 times daily. 22   Rosemary Shoemaker MD   furosemide (LASIX) 40 MG tablet Take 1 tablet by mouth daily 22   Rosemary Shoemaker MD   Nutritional Supplements (RONEY PO) Take 1 packet by mouth 2 times daily    Historical Provider, MD   Nutritional Supplements (ENSURE HIGH PROTEIN) LIQD Take 1 Can by mouth 2 times daily    Historical Provider, MD   Wound Dressings (MEPILEX EX) Apply topically Cleanse skin graft areas with normal saline, pat dry, apply mepilex foam board, change every 3 days as needed.     Historical Provider, MD   traZODone (DESYREL) 100 MG tablet Take 100 mg by mouth nightly    Historical Provider, MD   loperamide (IMODIUM) 2 MG capsule Take 2 mg by mouth 4 times daily as needed for Diarrhea    Historical Provider, MD   Melatonin 5 MG CAPS Take 1 capsule by mouth nightly    Historical Provider, MD   levothyroxine (SYNTHROID) 75 MCG tablet Take 1 tablet by mouth Daily 2/3/22   Hortencia Schmitz MD   albuterol sulfate HFA (PROVENTIL HFA) 108 (90 Base) MCG/ACT inhaler Inhale 2 puffs into the lungs every 6 hours as needed for Wheezing or Shortness of Breath 1/20/22   Paige Oconnor MD   guaiFENesin [facility]) 600 MG extended release tablet Take 1 tablet by mouth 2 times daily as needed for Congestion 1/20/22   Paige Oconnor MD   [facility]) OINT ointment Apply topically daily as needed    Historical Provider, MD   gabapentin (NEURONTIN) 600 MG tablet Take 600 mg by mouth 2 times daily. 5/13/21   Historical Provider, MD   dicyclomine (BENTYL) 20 MG tablet Take 20 mg by mouth 3 times daily as needed 12/3/20   Historical Provider, MD   vitamin B-12 (CYANOCOBALAMIN) 1000 MCG tablet Take 1,000 mcg by mouth daily    Historical Provider, MD   aspirin 81 MG chewable tablet Take 1 tablet by mouth daily 5/7/20   Miroslava Polanco MD   pantoprazole (PROTONIX) 40 MG tablet Take 1 tablet by mouth every morning (before breakfast) 5/6/20   Miroslava Polanco MD   tamsulosin (FLOMAX) 0.4 MG capsule Take 0.8 mg by mouth daily     Historical Provider, MD   atorvastatin (LIPITOR) 80 MG tablet Take 80 mg by mouth nightly.     Historical Provider, MD       Current medications:    Current Facility-Administered Medications   Medication Dose Route Frequency Provider Last Rate Last Admin    pantoprazole (PROTONIX) injection 40 mg  40 mg IntraVENous Daily Alejandro Rodriguez MD   40 mg at 09/02/22 1509    cefTRIAXone (ROCEPHIN) 2,000 mg in dextrose 5 % 50 mL IVPB mini-bag  2,000 mg IntraVENous Q24H Pia Noyola MD   Stopped at 09/02/22 0007    linezolid (ZYVOX) tablet 600 mg  600 mg Oral 2 times per day Pia Noyola MD   600 mg at 09/02/22 1014    traZODone (DESYREL) tablet 100 mg  100 mg Oral Nightly Yecenia Patel MD   100 mg at 09/01/22 2333    sodium chloride flush 0.9 % injection 5-40 mL  5-40 mL IntraVENous 2 times per day Jorge Howard MD   10 mL at 09/02/22 1015    sodium chloride flush 0.9 % injection 5-40 mL  5-40 mL IntraVENous PRN Jorge Howard MD   10 mL at 09/02/22 1509    0.9 % sodium chloride infusion IntraVENous PRN Karen Cleary MD        ondansetron (ZOFRAN-ODT) disintegrating tablet 4 mg  4 mg Oral Q8H PRN Karen Cleary MD        Or    [facility]etron [facility]) injection 4 mg  4 mg IntraVENous Q6H PRN Karen Cleary MD        polyethylene glycol [facility]) packet 17 g  17 g Oral Daily PRN Karen Cleary MD        acetaminophen (TYLENOL) tablet 650 mg  650 mg Oral Q6H PRN Karen Cleary MD   650 mg at 09/01/22 2332    Or    acetaminophen (TYLENOL) suppository 650 mg  650 mg Rectal Q6H PRN Karen Cleary MD        [Held by provider] aspirin chewable tablet 81 mg  81 mg Oral Daily Karen Cleary MD   81 mg at 09/01/22 0851    atorvastatin (LIPITOR) tablet 80 mg  80 mg Oral Nightly Karen Cleary MD   80 mg at 09/01/22 2332    furosemide (LASIX) tablet 40 mg  40 mg Oral Daily Karen Cleary MD   40 mg at 09/02/22 1014    gabapentin (NEURONTIN) tablet 600 mg  600 mg Oral BID Karen Cleary MD   600 mg at 09/02/22 1014    levothyroxine (SYNTHROID) tablet 75 mcg  75 mcg Oral Daily Karen Cleary MD   75 mcg at 09/02/22 0550    melatonin disintegrating tablet 5 mg  5 mg Oral Nightly Karen Cleary MD   5 mg at 09/01/22 2332    tamsulosin (FLOMAX) capsule 0.8 mg  0.8 mg Oral Daily Karen Cleary MD   0.8 mg at 09/02/22 1014    vitamin B-12 (CYANOCOBALAMIN) tablet 1,000 mcg  1,000 mcg Oral Daily Karen Cleary MD   1,000 mcg at 09/02/22 1014       Allergies:     Allergies   Allergen Reactions    Bactrim [Sulfamethoxazole-Trimethoprim] Rash       Problem List:    Patient Active Problem List   Diagnosis Code    Hypotension I95.9    Light headed R42    Cellulitis L03.90    Essential hypertension I10    GERD (gastroesophageal reflux disease) K21.9    Acute blood loss anemia D62    Tinea corporis B35.4    Carotid stenosis I65.29    CAD (coronary artery disease) I25.10    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 Olevia Screen    Complicated UTI (urinary tract infection) N39.0    Edema R60.9    Problem with urinary catheter ([facility] [address] T83. 9XXA    Acute encephalopathy G93.40    Chronic indwelling Iglesias catheter Z97.8    Hyperlipidemia E78.5    Bilateral lower leg cellulitis L03.116, L03.115    Neurogenic bladder N31.9    Bacteriuria R82.71    Abnormality of urethral meatus Q64.70    Gross hematuria D20.4    Systolic congestive heart failure (HCC) I50.20    Dyspnea R06.00    Bradycardia R00.1    Pseudomonas infection A49.8    Acute renal injury ([facility] [address] N17.9    Cellulitis of scrotum N49.2    Constipation K59.00    Encopresis with constipation and overflow incontinence R15.9    Abnormal stress test R94.39    H/O angiography Z92.89    Abnormal angiogram R93.89    Acute cystitis with hematuria N30.01    Acute renal failure superimposed on stage 3 chronic kidney disease (HCC) N17.9, N18.30    Urinary tract infection associated with indwelling urethral catheter ([facility]) T83.511A, N39.0    Encephalopathy acute G93.40    Altered mental status R41.82    Hyperkalemia E87.5    Leg laceration, unspecified laterality, initial encounter S81.819A    Pain of right lower extremity M79.604    Degloving injury T14. 8XXA    Weakness R53.1    Symptomatic bradycardia R00.1    Bilateral leg edema R60.0    Symptomatic sinus bradycardia R00.1    Multiple drug resistant organism (MDRO) culture positive Z16.24    Osteomyelitis ([facility]) M86.9    Chronic multifocal osteomyelitis of right foot ([facility]) M86.371    MRSA infection A49.02    Septic arthritis of interphalangeal joint of toe of right foot ([facility]) M00.9    Elevated sed rate R70.0    Elevated C-reactive protein (CRP) R79.82    Infection requiring contact isolation precautions B99.9    Class 1 obesity due to excess calories with body mass index (BMI) of 30.0 to 30.9 in adult E66.09, Z68.30    Weight loss counseling, encounter for Z71.3    Electrolyte imbalance E87.8       Past Medical History:        Diagnosis Date    BPH (benign prostatic hyperplasia)     C. difficile colitis 04/11/2022    Carotid stenosis 12/2013    JESU 84-23% stenosis; LICA 65-42% stenosis    Cellulitis 12/2013    LLE    Chronic back pain     Encounter for imaging to screen for metal prior to MRI 05/26/2022    [employer]: Synchromed II pump for baclofen -lfe    GERD (gastroesophageal reflux disease)     History of atrial fibrillation     Hypertension     Lower GI bleed     MDRO (multiple drug resistant organisms) resistance 10/26/2019    urine    Neuromuscular disorder (HCC)     spasticity    Renal insufficiency     Septic arthritis of interphalangeal joint of toe of right foot ([facility] [address] 83/07/8680    Systolic congestive heart failure ([facility] [address]     Vitamin B12 deficiency        Past Surgical History:        Procedure Laterality Date    BACK SURGERY  2006    lower lumbar    CORONARY ARTERY BYPASS GRAFT  12/13/2013    CABG x 5 (Dr Lexi Lake), svg to diag, om1 and 3, distal rca, kelly to lad.      CYSTOSCOPY N/A 1/10/2019    CYSTOSCOPY performed by Sherle Olszewski, MD at [address] Left 5/29/2022    LEFT FOOT PARTIAL FIFTH RAY RESECTION WITH EXCISIONAL DEBRIDEMENT OF MUSCLE AND FASCIA, WITH APPLICATION OF GRAFT performed by Ronnie Peace DPM at [place] [address] Right 5/1/2015    ORIF    LEG SURGERY Right 11/2/2021    RIGHT LOWER EXTREMITY ADVANCEMENT FLAP AND SPLIT THICKNESS SKIN GRAFT PLACEMENT; (WOUND- 10 CM X 5.5 CM; CLOSURE- 6 CM X 5.5 CM; SKIN GRAFT- 7.2 CM X 5 CM) performed by Kailash Ruiz MD at [address] [place] [address] 6/11/2020    [facility] performed by Maria Elena Mcneill MD at [address] [facility] 5/4/2020    EGD BIOPSY performed by Maria Elena Mcneill MD at [facility] ENDOSCOPY       Social History:    Social History     Tobacco Use    Smoking status: Former Types: Cigarettes     Quit date: 1969     Years since quittin.7    Smokeless tobacco: Never   Substance Use Topics    Alcohol use: No                                Counseling given: Not Answered      Vital Signs (Current):   Vitals:    22 1936 22 0345 22 0834 22 1508   BP: 111/62 121/67 131/75 (!) 149/81   Pulse: 54 50 (!) 46 51   Resp: 18 18 19 18   Temp: 97.3 °F (36.3 °C) 97.5 °F (36.4 °C) 97.3 °F (36.3 °C) 97.5 °F (36.4 °C)   TempSrc: Oral Oral Oral Oral   SpO2: 94% 93% 93% 95%   Weight:  224 lb 6.9 oz (101.8 kg)     Height:                                                  BP Readings from Last 3 Encounters:   22 (!) 149/81   22 121/72   22 131/71       NPO Status:                                                                                 BMI:   Wt Readings from Last 3 Encounters:   22 224 lb 6.9 oz (101.8 kg)   22 225 lb (102.1 kg)   22 218 lb 7.6 oz (99.1 kg)     Body mass index is 30.44 kg/m².     CBC:   Lab Results   Component Value Date/Time    WBC 7.3 2022 07:37 AM    RBC 3.42 2022 07:37 AM    HGB 11.5 2022 01:56 PM    HCT 36.1 2022 01:56 PM    MCV 94.5 2022 07:37 AM    RDW 15.4 2022 07:37 AM     2022 07:37 AM       CMP:   Lab Results   Component Value Date/Time     2022 07:37 AM    K 4.4 2022 07:37 AM    K 4.4 2022 02:46 PM     2022 07:37 AM    CO2 26 2022 07:37 AM    BUN 41 2022 07:37 AM    CREATININE 1.1 2022 07:37 AM    GFRAA >60 2022 07:37 AM    AGRATIO 0.9 2022 04:37 PM    LABGLOM >60 2022 07:37 AM    GLUCOSE 95 2022 07:37 AM    PROT 6.3 2022 04:37 PM    CALCIUM 8.3 2022 07:37 AM    BILITOT <0.2 2022 04:37 PM    ALKPHOS 45 2022 04:37 PM    AST 20 2022 04:37 PM    ALT 25 2022 04:37 PM       POC Tests: No results for input(s): POCGLU, POCNA, POCK, POCCL, POCBUN, Messi Paige in the last 72 hours. Coags:   Lab Results   Component Value Date/Time    PROTIME 14.3 09/02/2022 07:36 AM    INR 1.12 09/02/2022 07:36 AM    APTT 42.3 10/31/2021 07:31 PM       HCG (If Applicable): No results found for: PREGTESTUR, PREGSERUM, HCG, HCGQUANT     ABGs:   Lab Results   Component Value Date/Time    PHART 7.391 01/28/2022 02:58 PM    PO2ART 72.4 01/28/2022 02:58 PM    UPO6QCZ 41.1 01/28/2022 02:58 PM    JLL7MEF 25.0 01/28/2022 02:58 PM    BEART 0 01/28/2022 02:58 PM    T8XXROWK 94 01/28/2022 02:58 PM        Type & Screen (If Applicable):  No results found for: LABABO, LABRH    Drug/Infectious Status (If Applicable):  No results found for: HIV, HEPCAB    COVID-19 Screening (If Applicable):   Lab Results   Component Value Date/Time    COVID19 Not Detected 01/27/2022 01:34 AM           Anesthesia Evaluation  Patient summary reviewed and Nursing notes reviewed  Airway: Mallampati: II  TM distance: >3 FB   Neck ROM: full  Mouth opening: > = 3 FB   Dental:          Pulmonary:   (+) shortness of breath:                             Cardiovascular:    (+) hypertension:, CAD:, CABG/stent:, dysrhythmias: atrial fibrillation, CHF: systolic,                   Neuro/Psych:               GI/Hepatic/Renal:   (+) GERD:, renal disease: CRI,           Endo/Other:    (+) hypothyroidism::., .                 Abdominal:             Vascular: Other Findings:           Anesthesia Plan      general     ASA 1    (60-year-old male presents for INCISION AND DRAINAGE PARTIAL 5TH RAY RESECTION RIGHT FOOT. Plan general anesthesia with ASA standard monitors. Questions answered. Patient agreeable with anesthetic plan.  )  Induction: intravenous. Anesthetic plan and risks discussed with patient. Plan discussed with CRNA.     Attending anesthesiologist reviewed and agrees with Leonid Sandoval MD   9/2/2022

## 2022-09-02 NOTE — PROGRESS NOTES
ID Follow-up NOTE    CC:   R LE cellulitis, LE wounds  Antibiotics: Linezolid, Ceftriaxone     Admit Date: 8/30/2022  Hospital Day: 4    Subjective:     Plan to got to OR today for R partial 5th ray amp    Patient reports no R foot pain      Objective:     Patient Vitals for the past 8 hrs:   BP Temp Temp src Pulse Resp SpO2 Weight   09/02/22 0834 131/75 97.3 °F (36.3 °C) Oral (!) 46 19 93 % --   09/02/22 0345 121/67 97.5 °F (36.4 °C) Oral 50 18 93 % 224 lb 6.9 oz (101.8 kg)       I/O last 3 completed shifts: In: 240 [P.O.:240]  Out: 1700 [Urine:1700]  No intake/output data recorded. EXAM:  GENERAL: No apparent distress.     HEENT: Membranes moist, no oral lesion  NECK:  Supple, no lymphadenopathy  LUNGS: Clear b/l, no rales, no dullness  CARDIAC: RRR, no murmur appreciated  ABD:  + BS, soft / NT  EXT:  Bilateral LE dressing / ACE  NEURO: No focal neurologic findings  PSYCH: Orientation, sensorium, mood normal  LINES:  Peripheral iv       Data Review:  Lab Results   Component Value Date    WBC 7.3 09/02/2022    HGB 10.6 (L) 09/02/2022    HCT 32.3 (L) 09/02/2022    MCV 94.5 09/02/2022     09/02/2022     Lab Results   Component Value Date    CREATININE 1.1 09/02/2022    BUN 41 (H) 09/02/2022     09/02/2022    K 4.4 09/02/2022     09/02/2022    CO2 26 09/02/2022       Hepatic Function Panel:   Lab Results   Component Value Date/Time    ALKPHOS 45 04/22/2022 04:37 PM    ALT 25 04/22/2022 04:37 PM    AST 20 04/22/2022 04:37 PM    PROT 6.3 04/22/2022 04:37 PM    BILITOT <0.2 04/22/2022 04:37 PM    BILIDIR <0.2 06/20/2021 02:16 AM    IBILI see below 06/20/2021 02:16 AM    LABALBU 3.0 09/02/2022 07:37 AM       MICRO:  8/30 BC x 2 no growth to date    8/30 R foot wound - heavy S aureus / MRSA, light K pneumoniae, light K aerogenes  Staph aureus mrsa   Antibiotic Interpretation Microscan    ceFAZolin Resistant 8 mcg/mL   clindamycin Sensitive <=0.5 mcg/mL   erythromycin Resistant >4 mcg/mL   oxacillin Resistant >2 mcg/mL   tetracycline Sensitive <=4 mcg/mL   trimethoprim-sulfamethoxazole Sensitive <=0.5/9.5 mcg/mL   vancomycin Sensitive 1 mcg/mL     IMAGIN/30 R foot x-ray   1. Suspected fracture of the second metatarsal shaft, incompletely assessed in this study. 2. Diffuse osteopenia in the tibia and fibula as well as the foot. Soft tissue swelling in the foot. 3. Incomplete assessment of the digits.  R foot MRI  1. Extremely limited study due to motion. 2. Findings suspicious for acute osteomyelitis involving the distal fifth metatarsal as well as the fifth proximal phalanx. 3. Diffuse subcutaneous edema which could reflect cellulitis or venous stasis. 4. Atrophy within the intrinsic musculature of the foot compatible with diabetic neuropathy. 5. Nonspecific marrow edema within the first proximal phalanx as well as the third middle phalanx. This could reflect infection versus reactive change.     Scheduled Meds:   cefTRIAXone (ROCEPHIN) IV  2,000 mg IntraVENous Q24H    linezolid  600 mg Oral 2 times per day    traZODone  100 mg Oral Nightly    sodium chloride flush  5-40 mL IntraVENous 2 times per day    [Held by provider] aspirin  81 mg Oral Daily    atorvastatin  80 mg Oral Nightly    furosemide  40 mg Oral Daily    gabapentin  600 mg Oral BID    levothyroxine  75 mcg Oral Daily    melatonin  5 mg Oral Nightly    tamsulosin  0.8 mg Oral Daily    vitamin B-12  1,000 mcg Oral Daily       Continuous Infusions:   sodium chloride         PRN Meds:  sodium chloride flush, sodium chloride, ondansetron **OR** ondansetron, polyethylene glycol, acetaminophen **OR** acetaminophen      Assessment:     Hx AF, JARAD, CHF, neurogenic bladder with chronic schneider  Hx LE venous wounds  Hx mult admissions     Hx R 5th partial ray resection 22, Stravix graft placed     R LE cellulitis  R distal 5th MT and prox phalanx OM on MRI ('suspicous' / continuous with wound)  LE wounds    Plan:     Cont Linezolid + Ceftriaxone  Will f/u on cult - asked Micro to preform sensitivity testing on Klebsiella isolates 9/1  Wound care per Podiatry  Surgical intervention per Podiatry - plan for R partial ray resection      Medical Decision Making:   The following items were considered in medical decision making:  Discussion of patient care with other providers  Reviewed clinical lab tests  Reviewed radiology tests  Reviewed other diagnostic tests/interventions  Microbiology cultures and other micro tests reviewed      Discussed with pt, Podiatry Resident   Call with ID issues over weekend  Elysia Jeffers MD

## 2022-09-02 NOTE — PLAN OF CARE
Problem: Skin/Tissue Integrity  Goal: Absence of new skin breakdown  Description: 1. Monitor for areas of redness and/or skin breakdown  2. Assess vascular access sites hourly  3. Every 4-6 hours minimum:  Change oxygen saturation probe site  4. Every 4-6 hours:  If on nasal continuous positive airway pressure, respiratory therapy assess nares and determine need for appliance change or resting period.   Outcome: Progressing     Problem: Chronic Conditions and Co-morbidities  Goal: Patient's chronic conditions and co-morbidity symptoms are monitored and maintained or improved  Outcome: Progressing     Problem: Discharge Planning  Goal: Discharge to home or other facility with appropriate resources  Outcome: Progressing     Problem: Pain  Goal: Verbalizes/displays adequate comfort level or baseline comfort level  Outcome: Progressing     Problem: Safety - Adult  Goal: Free from fall injury  Outcome: Progressing

## 2022-09-02 NOTE — PROGRESS NOTES
Progress Note    Admit Date: 8/30/2022  Day: 2  Diet: Diet NPO Exceptions are: Sips of Water with Meds    CC: leg pain    Interval history: OR today for I&D and partial 5th ray resection. No new or worsening symptoms. HPI: 60-year-old man with a past medical history of GERD, congestive heart failure, neurogenic bladder with chronic Iglesias, UTI, A fib, history of lower extremity venous wounds presented with right lower extremity pain and swelling. The patient was getting wound care at his nursing home ([facility] [address]) when his nurse told him that his foot looks infected and he needs to go to the ED as his wound looked infected. The patient also reports that he had a temperature at his nursing home with a T-max being 101.2. In the ED the patient had normal vitals, grossly normal CBC and BMP. His CRP was elevated at 25 and his ESR was elevated at 56. X-ray of the right lower extremity showed soft tissue swelling but no cortical bone loss or gas. The patient reports that he does recall pain in the right lower leg starting 1 to 2 days ago but beyond that is unable to provide much of a history. He denies chest pain, shortness of breath, abdominal pain, headache, sensory loss, motor weakness.     Medications:     Scheduled Meds:   cefTRIAXone (ROCEPHIN) IV  2,000 mg IntraVENous Q24H    linezolid  600 mg Oral 2 times per day    traZODone  100 mg Oral Nightly    sodium chloride flush  5-40 mL IntraVENous 2 times per day    [Held by provider] aspirin  81 mg Oral Daily    atorvastatin  80 mg Oral Nightly    furosemide  40 mg Oral Daily    gabapentin  600 mg Oral BID    levothyroxine  75 mcg Oral Daily    melatonin  5 mg Oral Nightly    tamsulosin  0.8 mg Oral Daily    vitamin B-12  1,000 mcg Oral Daily     Continuous Infusions:   sodium chloride       PRN Meds:sodium chloride flush, sodium chloride, ondansetron **OR** ondansetron, polyethylene glycol, acetaminophen **OR** acetaminophen    Objective:   Vitals: T-max:  Patient Vitals for the past 8 hrs:   BP Temp Temp src Pulse Resp SpO2 Weight   09/02/22 0345 121/67 97.5 °F (36.4 °C) Oral 50 18 93 % 224 lb 6.9 oz (101.8 kg)         Intake/Output Summary (Last 24 hours) at 9/2/2022 7946  Last data filed at 9/2/2022 0345  Gross per 24 hour   Intake 240 ml   Output 1000 ml   Net -760 ml       Review of Systems   Constitutional:  Negative for activity change and appetite change. HENT:  Negative for dental problem, drooling, ear discharge, sinus pressure, sore throat and tinnitus. Respiratory:  Negative for apnea, cough and shortness of breath. Cardiovascular:  Negative for chest pain and leg swelling. Gastrointestinal:  Negative for abdominal distention. Genitourinary:  Negative for difficulty urinating, dysuria, frequency, genital sores, hematuria and penile pain. Skin:  Negative for color change. Neurological:  Negative for speech difficulty, numbness and headaches. Psychiatric/Behavioral:  Negative for agitation, behavioral problems, confusion, self-injury and suicidal ideas. Physical Exam  Constitutional:       Appearance: Normal appearance. HENT:      Head: Normocephalic and atraumatic. Nose: Nose normal.      Mouth/Throat:      Mouth: Mucous membranes are moist.   Eyes:      Extraocular Movements: Extraocular movements intact. Pupils: Pupils are equal, round, and reactive to light. Cardiovascular:      Rate and Rhythm: Normal rate and regular rhythm. Heart sounds:     No gallop. Pulmonary:      Effort: No respiratory distress. Breath sounds: No wheezing or rales. Abdominal:      General: There is no distension. Palpations: There is no mass. Tenderness: There is no abdominal tenderness. There is no rebound. Hernia: No hernia is present. Musculoskeletal:         General: Tenderness present. No swelling. Right lower leg: Edema present. Left lower leg: Edema present.    Skin:     General: Skin is warm.      Capillary Refill: Capillary refill takes less than 2 seconds. Neurological:      Mental Status: He is alert and oriented to person, place, and time. Cranial Nerves: No cranial nerve deficit. Sensory: No sensory deficit. Motor: No weakness. Gait: Gait normal.   Psychiatric:         Mood and Affect: Mood normal.         Behavior: Behavior normal.         LABS:    CBC:   Recent Labs     08/31/22  0555 09/01/22  0406 09/02/22  0737   WBC 9.5 8.1 7.3   HGB 11.5* 10.4* 10.6*   HCT 35.2* 31.9* 32.3*    173 173   MCV 94.0 94.3 94.5       Renal:    Recent Labs     08/30/22  1446 08/31/22  0554 09/01/22  0406    136 140   K 4.4 3.9 4.4    100 104   CO2 26 26 23   BUN 36* 38* 38*   CREATININE 1.2 1.0 0.9   GLUCOSE 106* 104* 99   CALCIUM 8.3 8.3 8.0*   MG  --  2.10 2.10   PHOS  --  3.7 3.9   ANIONGAP 12 10 13       Hepatic:   Recent Labs     08/31/22  0554 09/01/22  0406   LABALBU 3.1* 2.9*       Troponin: No results for input(s): TROPONINI in the last 72 hours. BNP: No results for input(s): BNP in the last 72 hours. Lipids: No results for input(s): CHOL, HDL in the last 72 hours. Invalid input(s): LDLCALCU, TRIGLYCERIDE  ABGs:  No results for input(s): PHART, JAV4CJI, PO2ART, HEM8XNC, BEART, THGBART, O2NMJEYN, YRR6FZW in the last 72 hours. INR:   Recent Labs     09/02/22  0736   INR 1.12     Lactate: No results for input(s): LACTATE in the last 72 hours. Cultures:  -----------------------------------------------------------------  RAD:   XR CHEST (2 VW)   Final Result      1. Unchanged pleural thickening or small left pleural effusion when compared to the prior exam.      2.  Pulmonary vascular congestion without localized consolidation. MRI FOOT RIGHT WO CONTRAST   Final Result   1. Extremely limited study due to motion. 2. Findings suspicious for acute osteomyelitis involving the distal fifth metatarsal as well as the fifth proximal phalanx.    3. Diffuse subcutaneous edema which could reflect cellulitis or venous stasis. 4. Atrophy within the intrinsic musculature of the foot compatible with diabetic neuropathy. 5. Nonspecific marrow edema within the first proximal phalanx as well as the third middle phalanx. This could reflect infection versus reactive change. XR FOOT RIGHT (MIN 3 VIEWS)   Final Result   1. Suspected fracture of the second metatarsal shaft, incompletely assessed in this study. 2. Diffuse osteopenia in the tibia and fibula as well as the foot. Soft tissue swelling in the foot. 3. Incomplete assessment of the digits. XR TIBIA FIBULA RIGHT (2 VIEWS)   Final Result   1. Suspected fracture of the second metatarsal shaft, incompletely assessed in this study. 2. Diffuse osteopenia in the tibia and fibula as well as the foot. Soft tissue swelling in the foot. 3. Incomplete assessment of the digits. Assessment/Plan:     81-year-old man with a past medical history of GERD, congestive heart failure, neurogenic bladder with chronic Iglesias, UTI, A fib, history of lower extremity venous wounds presented with right lower extremity pain and swelling consistent with cellulitis.     Cellulitis/Osteomyelitis of right lower extremity  - The wound looks infected and he has a history of MRSA growing from his wound on a prior admission  - Blood cultures with no growth to date  - Trend CRP and Pro-Calc  - Podiatry already consulted from the ED  --> f/u postop I&D + partial 5th ray resection recs  -- ID consulted  --> continue linezolid and ceftriaxone  -- MRI RLE  --> findings suspicious for OM of distal 5th metatarsal and 5th proximal phalanx     CAD s/p CABG in 2013  - held for surgery - aspirin 81 mg daily  -continue lipitor 80 mg nightly    Neuropathy  - Has an intrathecal baclofen pump to alleviate muscle spasticity 2/2 neuropathy managed by [facility]  --> Outpatient follow up being managed by Dr. Karen Harris office     Hypothyroidism -continue home synthyroid 75 mcg      BPH  -continue home flomax 0.8 mg PO daily   -chronic indwelling foleys catheter      Mood disorders   -continue home trazodone 25 mg PO nightly      Hx of Intertrigo likely 2/2 fungal rash  - Miconazole powder PRN to keep area dry     Code Status: Full code  FEN: Adult  PPX: SQ[facility] held for surgery, resume postop  DISPO: Remi Flowers MD, PGY-1  09/02/22  8:32 AM    This patient has been staffed and discussed with Vonda Wagner MD.

## 2022-09-02 NOTE — PROGRESS NOTES
Patient admitted to PACU #7 from OR per bed at 1847 s/p  [facility] 83 RESECTION RIGHT FOOT - Right. Report received at bedside in PACU per CRNA. Patient was reported to be hypotensive in OR which he received treatment for, see anesthesia record. No complications reported. Patient connected to PACU monitoring equipment. IVF's infusing with site unremarkable. Patient arrived to PACU responsive but not fully wakeful from anesthesia but with respirations easy, even and regular with no pain noted or verbalized. Right foot surgical dressing with DSD and ace wrap remains C,D,I with no drainage noted. Bilateral pravalon boots on. No further changes. Will continue to monitor.

## 2022-09-02 NOTE — CARE COORDINATION
CM following: BAYRON spoke with Glynn Morel with [facility] who reinforced that the patient will discharge to [facility]. CM to contact [facility] [phone] with [facility] at discharge. Marleny Howard and Glynn Morel will attend the hospital today to visit the patient. Electronically signed by ARIC Jackson on 9/2/2022 at 10:07 AM  [phone]    UPDATE: CM met with patient at bedside this PM. Patient prepared for surgery for this PM. Patient reports both Marleny Howard and Glynn Morel stopped by to see the patient today and patient in continued agreement with discharge to [facility] when medically ready for discharge.   Electronically signed by ARIC Jackson on 9/2/2022 at 3:17 PM  [phone]

## 2022-09-02 NOTE — PROGRESS NOTES
PACU Transfer to Floor Note    Procedure(s):  INCISION AND DRAINAGE PARTIAL 5TH RAY RESECTION RIGHT FOOT  . Current Allergies: Bactrim [sulfamethoxazole-trimethoprim]    Pt meets criteria as per Diego Score and ASPAN Standards to transfer to next phase of care. No results for input(s): POCGLU in the last 72 hours. Vitals:    09/02/22 1945   BP: (!) 112/59   Pulse: 83   Resp: 15   Temp: 97.1 °F (36.2 °C)   SpO2: 92%     Vitals within 20% of pt's admission vitals as per DIEGO SCORE    SpO2: 92 %    O2 Flow Rate (L/min): 1 L/min      Intake/Output Summary (Last 24 hours) at 9/2/2022 1952  Last data filed at 9/2/2022 1945  Gross per 24 hour   Intake 50 ml   Output 2675 ml   Net -2625 ml       Pain assessment:  none    Pain Level: 0    Patient was assessed for alterations to skin integrity. There were not alterations observed. Is patient incontinent: no    Handoff report given at bedside.    Family updated and directed to pt room      9/2/2022 7:52 PM

## 2022-09-02 NOTE — PROGRESS NOTES
Podiatric Surgery Daily Progress Note  Srini Flannerycathie Mckayla      Subjective :   Patient seen and examined this am at the bedside. Patient denies any acute overnight events. Patient denies N/V/F/C/SOB. Patient denies calf pain, thigh pain, chest pain. Review of Systems: A 12 point review of symptoms is unremarkable with the exception of the chief complaint. Patient specifically denies nausea, fever, vomiting, chills, shortness of breath, chest pain, abdominal pain, constipation or difficulty urinating. Objective     /67   Pulse 50   Temp 97.5 °F (36.4 °C) (Oral)   Resp 18   Ht 6' (1.829 m)   Wt 220 lb 14.4 oz (100.2 kg)   SpO2 93%   BMI 29.96 kg/m²      I/O:  Intake/Output Summary (Last 24 hours) at 9/2/2022 0551  Last data filed at 9/1/2022 1955  Gross per 24 hour   Intake 240 ml   Output 450 ml   Net -210 ml              Wt Readings from Last 3 Encounters:   09/01/22 220 lb 14.4 oz (100.2 kg)   08/31/22 225 lb (102.1 kg)   07/07/22 218 lb 7.6 oz (99.1 kg)       LABS:    Recent Labs     08/31/22  0555 09/01/22  0406   WBC 9.5 8.1   HGB 11.5* 10.4*   HCT 35.2* 31.9*    173        Recent Labs     09/01/22  0406      K 4.4      CO2 23   PHOS 3.9   BUN 38*   CREATININE 0.9      No results for input(s): PROT, INR, APTT in the last 72 hours. LOWER EXTREMITY EXAMINATION    Dressing to B/L LE intact. No strikethrough noted to the external dressing. Sanguinous drainage noted to the internal layers of the dressing. Vascular:  DP and PT pulses non-palpable B/L but upon doppler examination were found to be biphasic B/L. CFT is brisk to the digits of the foot b/l. Skin temperature is warm to cool from proximal to distal with no focal increase in temperature noted. Moderate erythema noted to the dorsal aspect of the right foot and to the right anterior lower leg. Moderate erythema noted to the anterior left lower leg just distal to the tibial tuberosity.   2+ pitting edema noted to the signs of infection noted. MUSCULOSKELETAL: Muscle strength is 4/5 for all pedal groups tested. No pain with palpation of the foot or ankle b/l. Ankle joint ROM is decreased in dorsiflexion with the knee extended. 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 Imaging:    XRAY Foot Right  Narrative   4 views of the right tibia/fibula, 2 views of the right foot       HISTORY: Pain. FINDINGS:       In the right tibia/fibula. There is osteopenia. No definite periosteal bone formation or cortical destruction is seen. There are vascular calcifications. Within the foot, there is diffuse osteopenia. The digits are not well assessed on this examination. There appears to be a fracture in the second metatarsal shaft, incompletely assessed on this exam. Soft tissue swelling is present throughout the foot. Impression   1. Suspected fracture of the second metatarsal shaft, incompletely assessed in this study. 2. Diffuse osteopenia in the tibia and fibula as well as the foot. Soft tissue swelling in the foot. 3. Incomplete assessment of the digits. MRI R foot   Narrative   MRI right foot without contrast       HISTORY: Osteomyelitis. Ulceration. COMPARISON: August 30, 2022. FINDINGS:       The study is significantly limited due to patient motion. A skin ulcer is visible overlying the region of the fifth metatarsal head. There is marked marrow edema with corresponding T1 signal hypointensity in the distal aspect of the fifth metatarsal as well as within the fifth proximal phalanx. No definite    loculated fluid collection identified. There is nonspecific marrow edema within the first proximal phalanx as well as third middle phalanx. There is diffuse subcutaneous edema within the foot. There is atrophy within the intrinsic musculature of the foot as well. No definite infectious tenosynovitis. Impression   1. Extremely limited study due to motion.    2. Findings suspicious for acute osteomyelitis involving the distal fifth metatarsal as well as the fifth proximal phalanx. 3. Diffuse subcutaneous edema which could reflect cellulitis or venous stasis. 4. Atrophy within the intrinsic musculature of the foot compatible with diabetic neuropathy. 5. Nonspecific marrow edema within the first proximal phalanx as well as the third middle phalanx. This could reflect infection versus reactive change. Laser skin perfusion study 5/27/22 right Impression:  Pulse volume recording at the level of the foot reveals waveforms within normal limits. Laser skin perfusion pressure study at the transmetatarsal level ([place]) is 148 mmHg; wound healing likely. This is considered  to have a good probability for healing. Laser skin perfusion pressure study at the transmetatarsal level (Medial plantar) is 112 mmHg; wound healing likely. This is  considered to have a good probability for healing. Arterial Duplex right 1/19/22: The right ankle/brachial index is 1.5 (the DP is 120 and the PT is 168 mmHg). The common femoral artery demonstrates multiphasic flow indicating no significant aorto-iliac inflow disease. Elevated velocities at the mid popliteal artery indicate a > 50% stenosis by velocity criteria (velocity went from 79 to 197 cm/s). The peroneal artery could not be visualized (possibly due to extensive edema). Abnormal monophasic Doppler waveforms were noted in the tibial arteries at the ankle. There were no previous studies to use for comparison. IMPRESSION/RECOMMENDATIONS:    - OM distal 5th metatarsal and proximal 5th phalanx right lower extremity.    - Cellulitis right lower extremity  - Full thickness ulcerations, lateral right 5th digit   - Hematoma right lower extremity-improving  - S/p left foot partial 5th ray resection with graft application (DOS 06/07/6785)  - Superficial abrasions, bilateral lower extremity  - Peripheral Neuropathy, bilateral lower extremit     -Patient examined and evaluated at the bedside   -Patient hypertensive otherwise VSS. No Leukocytosis noted 8.1  -ESR 5.6 , CRP 25.4  -prealbumin 17.4, continue oral nutritional supplement  -Labs and Imagining reviewed  - Wound culture: +1 Wbcs, Staphylococcus aureus, Klebsiella pneumoniae, Klebsiella aerogenes  -Dressing applied to the right lower extremity consisting of wet to dry to the ulceration, adaptic, gauze, Kerlix and ACE. Dressing applied to the left lower extremity consisting of gauze, kerlix, ace.   -Continue IV linezolid and ceftriaxone per Infectious disease.   -Weightbearing status: patient non-ambulatory at baseline  - Keep dressing clean, dry, and intact  - Prevalon boots ordered. Patient to wear at all times to prevent further soft tissue breakdown. - Upon reviewing MRI right lower extremity, it was determined that surgical intervention likely necessary at this time due to osteomyelitis of the right fifth metatarsal head and right fifth proximal phalanx. Surgery will be planned for tomorrow pending OR availability.  -Laser perfusion studies from 5/27/22 revealed adequate healing potential to the right lower extremity. Arterial duplex right lower extremity from 1/19/22 reviewed. Similar findings were noted to the left lower extremity, with surgical intervention completed in May of this year with healed surgical incision to date. There is likely similar healing potential to the right lower extremity. As the patient is non-ambulatory, he is likely not a candidate for revascularization. Vascular surgery will not be consulted at this time, but it is felt that the healing potential is fair to the right lower extremity.   -Surgical intervention planned for I&D with partial 5th ray amputation right lower extremity pending OR availability. DISPO: Osteomyelitis 5th metatarsal head and 5th proximal phalanx right lower extremity. ID following;Continue IV vancomycin and ceftriaxone.   Surgical intervention necessary at this time due to osteomyelitis of the right fifth metatarsal head and right fifth proximal phalanx. Surgery planned for I&D with partial 5th ray amputation today pending OR availability. Patient NPO. Patient seen and evaluated bedside with ANGELINA Mazariegos DPM   Podiatric Resident PGY1  Pager [phone] or [employer]  9/2/2022, 5:51 AM     Patient was seen and evaluated at bedside. Agree with residents assessment and treatment plan.   Ciera Wesley DPM

## 2022-09-02 NOTE — PROGRESS NOTES
Received call from Zoraida Michelle 44. Pt on the schedule for procedure today after 1700. Pt aware and NPO. Pt refusing to be washed up other than rich-care. Pt has new black, tarry stool. Fecal occult positive. Protonix administered. H and H stable from AM. No abdominal pain and VSS.

## 2022-09-02 NOTE — BRIEF OP NOTE
Brief Postoperative Note      Patient: Alex Hdez  YOB: 1940  MRN: [id number]    Date of Procedure: 9/2/2022    Pre-Op Diagnosis: Di[facility] [E22.221, L08.9]    Post-Op Diagnosis: Same       Procedure(s):  INCISION AND DRAINAGE PARTIAL 5TH RAY RESECTION RIGHT FOOT  . Surgeon(s):  Juju Tenorio DPM    Assistant:  Resident: Elvie Gutierrez DPM    Anesthesia: monitored anesthesia care    Injectables: pre-op 20 cc of 2% lidocaine plain and post-op 30 cc of 0.5% marcaine plain     Hemostasis: anatomic dissection and electrocautery    Materials: 3-0 Vicryl, 3-0 Nylon    Estimated Blood Loss (mL): 220 mL    Complications: None    Specimens:   ID Type Source Tests Collected by Time Destination   A : 5th Toe and Metatarsal Right Foot Tissue Tissue SURGICAL PATHOLOGY Juju Tenorio DPM 9/2/2022 1753    B : Clearance Fragment 5th Metatarsal Right Foot Bone Bone SURGICAL PATHOLOGY Juju Tenorio DPM 9/2/2022 1757        Implants:  * No implants in log *      Drains:   Negative Pressure Wound Therapy Leg Anterior; Lower;Right (Active)       Urinary Catheter 08/31/22 (Active)   Catheter Indications Urinary retention (acute or chronic), continuous bladder irrigation or bladder outlet obstruction 09/02/22 1511   Site Assessment No urethral drainage 09/02/22 1511   Urine Color Yellow 09/02/22 1511   Urine Appearance Hazy 09/02/22 1511   Collection Container Standard 09/02/22 1511   Securement Method Securing device (Describe) 09/02/22 1511   Catheter Care Completed Yes 09/02/22 1511   Catheter Best Practices  Drainage tube clipped to bed 09/02/22 1511   Status Draining 09/02/22 1511   Output (mL) 1000 mL 09/02/22 1511       Findings: Necrotic, discolored, and soft right 5th proximal phalanx and 5th metatarsal head. No purulent drainage encountered. Clearance fragment obtained and sent to pathology.  Remaining 5th metatarsal noted to be white and healthy     DISPO: s/p Right foot I&D with partial 5th ray amputation. Patient is non-weight bearing at baseline. Procedure was felt to be definitive. Clearance fragment obtained and sent for path. Patient ok to discharge from podiatric standpoint pending medical clearance and final ID recs.      Electronically signed by Jalene Meigs, DPM on 9/2/2022 at 6:47 PM

## 2022-09-02 NOTE — PROGRESS NOTES
Pre-Operative Note  Resident Note     Patient: Killian Perkins     Procedure: Incision and drainage with partial 5th ray amputation right lower extremity. Clearance for surgery: Yes, per Internal Medicine    Consent: Procedure was discussed at length with patient and all questions were answered to completion, consent obtained and placed in chart     Labs:        Recent Labs     09/01/22  0406 09/02/22  0737   WBC 8.1 7.3   HGB 10.4* 10.6*   HCT 31.9* 32.3*   MCV 94.3 94.5    173        Recent Labs     09/01/22  0406 09/02/22  0737    138   K 4.4 4.4    102   CO2 23 26   PHOS 3.9 4.5   BUN 38* 41*   CREATININE 0.9 1.1      No results for input(s): AST, ALT, ALB, BILIDIR, BILITOT, ALKPHOS in the last 72 hours. No results for input(s): LIPASE, AMYLASE in the last 72 hours. Recent Labs     09/02/22  0736   INR 1.12      No results for input(s): CKTOTAL, CKMB, CKMBINDEX, TROPONINI in the last 72 hours.     Pre-op Medications:   Current Facility-Administered Medications   Medication Dose Route Frequency Provider Last Rate Last Admin    cefTRIAXone (ROCEPHIN) 2,000 mg in dextrose 5 % 50 mL IVPB mini-bag  2,000 mg IntraVENous Q24H Cain Arguello MD   Stopped at 09/02/22 0007    linezolid (ZYVOX) tablet 600 mg  600 mg Oral 2 times per day Cain Arguello MD   600 mg at 09/02/22 1014    traZODone (DESYREL) tablet 100 mg  100 mg Oral Nightly Radha Brown MD   100 mg at 09/01/22 2333    sodium chloride flush 0.9 % injection 5-40 mL  5-40 mL IntraVENous 2 times per day Antionette Knapp MD   10 mL at 09/02/22 1015    sodium chloride flush 0.9 % injection 5-40 mL  5-40 mL IntraVENous PRN Antionette Knapp MD   10 mL at 08/31/22 0132    0.9 % sodium chloride infusion   IntraVENous PRN Antionette Knapp MD        ondansetron (ZOFRAN-ODT) disintegrating tablet 4 mg  4 mg Oral Q8H PRN Antionette Knapp MD        Or    ondansetron [facility]) injection 4 mg  4 mg IntraVENous Q6H PRN Antionette Knapp MD polyethylene glycol (GLYCOLAX) packet 17 g  17 g Oral Daily PRN Jose Medina MD        acetaminophen (TYLENOL) tablet 650 mg  650 mg Oral Q6H PRN Jose Medina MD   650 mg at 22 2332    Or    acetaminophen (TYLENOL) suppository 650 mg  650 mg Rectal Q6H PRN Jose Medina MD        [facility] [place] by provider] aspirin chewable tablet 81 mg  81 mg Oral Daily Jose Medina MD   81 mg at 22 0851    atorvastatin (LIPITOR) tablet 80 mg  80 mg Oral Nightly Jose Medina MD   80 mg at 22 2332    furosemide (LASIX) tablet 40 mg  40 mg Oral Daily Jose Medina MD   40 mg at 22 1014    gabapentin (NEURONTIN) tablet 600 mg  600 mg Oral BID Jose Medina MD   600 mg at 22 1014    levothyroxine (SYNTHROID) tablet 75 mcg  75 mcg Oral Daily Jose Medina MD   75 mcg at 22 0550    melatonin disintegrating tablet 5 mg  5 mg Oral Nightly Jose Medina MD   5 mg at 22 2332    tamsulosin (FLOMAX) capsule 0.8 mg  0.8 mg Oral Daily Jose Medina MD   0.8 mg at 22 1014    vitamin B-12 (CYANOCOBALAMIN) tablet 1,000 mcg  1,000 mcg Oral Daily Jose Medina MD   1,000 mcg at 22 1014       Diet: Diet NPO Exceptions are: Sips of Water with Meds    CXR:  Unchanged pleural thickening or small left pleural effusion when compared to the prior exam. Pulmonary vascular congestion without localized consolidation. EKst degree heart block     Echocardiogram: not done    IV access/ saline lock: yes    Antibiotics: Linezolid, Ceftriaxone    PT/INR: 14.3/1.12    Type and Screen: A pos/Neg    Pregnancy test: not indicated    COVID-19: Not Tested    Known risk factors for perioperative complications: Coronary disease  Congestive heart failure  Renal dysfunction      Anesthesia to see patient.     Ashley Rick DPM  22  12:48 PM

## 2022-09-03 LAB
ALBUMIN SERPL-MCNC: 3.2 G/DL (ref 3.4–5)
ANION GAP SERPL CALCULATED.3IONS-SCNC: 7 MMOL/L (ref 3–16)
BASOPHILS ABSOLUTE: 0.1 K/UL (ref 0–0.2)
BASOPHILS RELATIVE PERCENT: 1 %
BLOOD CULTURE, ROUTINE: NORMAL
BUN BLDV-MCNC: 35 MG/DL (ref 7–20)
C-REACTIVE PROTEIN: 16.5 MG/L (ref 0–5.1)
CALCIUM SERPL-MCNC: 8.2 MG/DL (ref 8.3–10.6)
CHLORIDE BLD-SCNC: 104 MMOL/L (ref 99–110)
CO2: 29 MMOL/L (ref 21–32)
CREAT SERPL-MCNC: 1 MG/DL (ref 0.8–1.3)
CULTURE, BLOOD 2: NORMAL
EOSINOPHILS ABSOLUTE: 0.7 K/UL (ref 0–0.6)
EOSINOPHILS RELATIVE PERCENT: 8 %
GFR AFRICAN AMERICAN: >60
GFR NON-AFRICAN AMERICAN: >60
GLUCOSE BLD-MCNC: 144 MG/DL (ref 70–99)
HCT VFR BLD CALC: 31.6 % (ref 40.5–52.5)
HEMOGLOBIN: 10.4 G/DL (ref 13.5–17.5)
LYMPHOCYTES ABSOLUTE: 0.9 K/UL (ref 1–5.1)
LYMPHOCYTES RELATIVE PERCENT: 11 %
MAGNESIUM: 2.1 MG/DL (ref 1.8–2.4)
MCH RBC QN AUTO: 30.8 PG (ref 26–34)
MCHC RBC AUTO-ENTMCNC: 33 G/DL (ref 31–36)
MCV RBC AUTO: 93.5 FL (ref 80–100)
MONOCYTES ABSOLUTE: 0.4 K/UL (ref 0–1.3)
MONOCYTES RELATIVE PERCENT: 4.8 %
NEUTROPHILS ABSOLUTE: 6.2 K/UL (ref 1.7–7.7)
NEUTROPHILS RELATIVE PERCENT: 75.2 %
PDW BLD-RTO: 15.2 % (ref 12.4–15.4)
PHOSPHORUS: 3.7 MG/DL (ref 2.5–4.9)
PLATELET # BLD: 195 K/UL (ref 135–450)
PMV BLD AUTO: 8.8 FL (ref 5–10.5)
POTASSIUM SERPL-SCNC: 4.7 MMOL/L (ref 3.5–5.1)
PROCALCITONIN: 0.1 NG/ML (ref 0–0.15)
RBC # BLD: 3.38 M/UL (ref 4.2–5.9)
SODIUM BLD-SCNC: 140 MMOL/L (ref 136–145)
WBC # BLD: 8.3 K/UL (ref 4–11)

## 2022-09-03 PROCEDURE — 6370000000 HC RX 637 (ALT 250 FOR IP)

## 2022-09-03 PROCEDURE — 85025 COMPLETE CBC W/AUTO DIFF WBC: CPT

## 2022-09-03 PROCEDURE — 6360000002 HC RX W HCPCS

## 2022-09-03 PROCEDURE — 1200000000 HC SEMI PRIVATE

## 2022-09-03 PROCEDURE — C9113 INJ PANTOPRAZOLE SODIUM, VIA: HCPCS

## 2022-09-03 PROCEDURE — 6370000000 HC RX 637 (ALT 250 FOR IP): Performed by: INTERNAL MEDICINE

## 2022-09-03 PROCEDURE — 2580000003 HC RX 258

## 2022-09-03 PROCEDURE — 94150 VITAL CAPACITY TEST: CPT

## 2022-09-03 PROCEDURE — 83735 ASSAY OF MAGNESIUM: CPT

## 2022-09-03 PROCEDURE — 84145 PROCALCITONIN (PCT): CPT

## 2022-09-03 PROCEDURE — 36415 COLL VENOUS BLD VENIPUNCTURE: CPT

## 2022-09-03 PROCEDURE — 80069 RENAL FUNCTION PANEL: CPT

## 2022-09-03 PROCEDURE — 86140 C-REACTIVE PROTEIN: CPT

## 2022-09-03 RX ORDER — PANTOPRAZOLE SODIUM 40 MG/1
40 TABLET, DELAYED RELEASE ORAL
Status: DISCONTINUED | OUTPATIENT
Start: 2022-09-03 | End: 2022-09-05

## 2022-09-03 RX ADMIN — PANTOPRAZOLE SODIUM 40 MG: 40 TABLET, DELAYED RELEASE ORAL at 15:34

## 2022-09-03 RX ADMIN — ACETAMINOPHEN 650 MG: 325 TABLET ORAL at 23:06

## 2022-09-03 RX ADMIN — FUROSEMIDE 40 MG: 40 TABLET ORAL at 08:13

## 2022-09-03 RX ADMIN — SODIUM CHLORIDE, PRESERVATIVE FREE 10 ML: 5 INJECTION INTRAVENOUS at 20:50

## 2022-09-03 RX ADMIN — CYANOCOBALAMIN TAB 1000 MCG 1000 MCG: 1000 TAB at 08:13

## 2022-09-03 RX ADMIN — LINEZOLID 600 MG: 600 TABLET, FILM COATED ORAL at 20:49

## 2022-09-03 RX ADMIN — HEPARIN SODIUM 5000 UNITS: 5000 INJECTION INTRAVENOUS; SUBCUTANEOUS at 15:34

## 2022-09-03 RX ADMIN — TRAZODONE HYDROCHLORIDE 100 MG: 100 TABLET ORAL at 23:07

## 2022-09-03 RX ADMIN — LEVOTHYROXINE SODIUM 75 MCG: 0.07 TABLET ORAL at 06:52

## 2022-09-03 RX ADMIN — SODIUM CHLORIDE, PRESERVATIVE FREE 10 ML: 5 INJECTION INTRAVENOUS at 08:14

## 2022-09-03 RX ADMIN — GABAPENTIN 600 MG: 600 TABLET, FILM COATED ORAL at 20:48

## 2022-09-03 RX ADMIN — TAMSULOSIN HYDROCHLORIDE 0.8 MG: 0.4 CAPSULE ORAL at 08:13

## 2022-09-03 RX ADMIN — PANTOPRAZOLE SODIUM 40 MG: 40 INJECTION, POWDER, FOR SOLUTION INTRAVENOUS at 08:13

## 2022-09-03 RX ADMIN — HEPARIN SODIUM 5000 UNITS: 5000 INJECTION INTRAVENOUS; SUBCUTANEOUS at 23:09

## 2022-09-03 RX ADMIN — LINEZOLID 600 MG: 600 TABLET, FILM COATED ORAL at 08:13

## 2022-09-03 RX ADMIN — Medication 5 MG: at 23:07

## 2022-09-03 RX ADMIN — ASPIRIN 81 MG 81 MG: 81 TABLET ORAL at 08:13

## 2022-09-03 RX ADMIN — HEPARIN SODIUM 5000 UNITS: 5000 INJECTION INTRAVENOUS; SUBCUTANEOUS at 06:52

## 2022-09-03 RX ADMIN — ATORVASTATIN CALCIUM 80 MG: 40 TABLET, FILM COATED ORAL at 20:48

## 2022-09-03 RX ADMIN — GABAPENTIN 600 MG: 600 TABLET, FILM COATED ORAL at 08:13

## 2022-09-03 ASSESSMENT — PAIN DESCRIPTION - DESCRIPTORS: DESCRIPTORS: BURNING

## 2022-09-03 ASSESSMENT — PAIN SCALES - GENERAL: PAINLEVEL_OUTOF10: 3

## 2022-09-03 ASSESSMENT — PAIN DESCRIPTION - ORIENTATION: ORIENTATION: RIGHT

## 2022-09-03 ASSESSMENT — PAIN DESCRIPTION - LOCATION: LOCATION: FOOT

## 2022-09-03 ASSESSMENT — PAIN - FUNCTIONAL ASSESSMENT: PAIN_FUNCTIONAL_ASSESSMENT: ACTIVITIES ARE NOT PREVENTED

## 2022-09-03 NOTE — ANESTHESIA POSTPROCEDURE EVALUATION
Department of Anesthesiology  Postprocedure Note    Patient: Khadra Baugh  MRN: [id number]  YOB: 1940  Date of evaluation: 9/2/2022      Procedure Summary     Date: 09/02/22 Room / Location: [address] [place] [address] [address] [place] [address] / [facility]    Anesthesia Start: 1729 Anesthesia Stop: 1853    Procedures:       INCISION AND DRAINAGE PARTIAL 5TH RAY RESECTION RIGHT FOOT (Right: Foot)      . (Right: Foot) Diagnosis:       Diabetic infection of right foot ([address]      (Diabetic infection of right foot ([address] [D04.253, L08.9])    Surgeons: Mary Howell DPM Responsible Provider: Tari Mora MD    Anesthesia Type: general ASA Status: 3          Anesthesia Type: No value filed.     Twila Phase I: Twila Score: 10    Twila Phase II:        Anesthesia Post Evaluation    Patient location during evaluation: PACU  Patient participation: complete - patient participated  Level of consciousness: awake and alert  Pain score: 0  Airway patency: patent  Nausea & Vomiting: no nausea and no vomiting  Complications: no  Cardiovascular status: hemodynamically stable  Respiratory status: acceptable  Hydration status: euvolemic

## 2022-09-03 NOTE — PROGRESS NOTES
VSS, A&O. Pt denies pain. BLE elevated and pt wearing bilateral pravalon boots. Iglesias intact, pt tolerating diet. Fall precaution in place and call light was used for needs. Pt had x1 small BM during this shift. Uneventful night with pt.

## 2022-09-03 NOTE — OP NOTE
[address]               [address] [place], [address]                                OPERATIVE REPORT    PATIENT NAME: Romulo Resendez                      :        1940  MED REC NO:   [id number]                          ROOM:       3310  ACCOUNT NO:   [de-identified]                           ADMIT DATE: 2022  PROVIDER:     Akanksha Hernandez DPM    DATE OF PROCEDURE:  2022    SURGEON:  Akanksha Hernandez DPM    ASSISTANT:  La Brush DPM, PGY-2    PREOPERATIVE DIAGNOSES:  Diabetic foot infection, right foot with  osteomyelitis of the fifth metatarsal.    POSTOPERATIVE DIAGNOSES:  Diabetic foot infection, right foot with  osteomyelitis of the fifth metatarsal.    PROCEDURES:  Incision and drainage down to bone cortex with partial  fifth ray resection, right foot. PATHOLOGY:  Amputated digit was sent to Pathology for gross microscopic  examination. A clearance fragment was taken from the fifth metatarsal  to ensure all infected bone had been resected. ANESTHESIA:  MAC with a local block consisting total of 20 mL of 2%  lidocaine plain. Postoperative injection includes 30 mL of 0.5%  Marcaine plain. ESTIMATED BLOOD LOSS:  100 mL. INDICATIONS FOR SURGERY:  The patient presented with a cellulitis to the  right lower extremity and although the cellulitis resolved with  antibiotics, MRI scan showed osteomyelitis of the fifth metatarsal which  corresponded to an ulceration that had exposed bone in the base of the  wound. After discussing with the patient all risks versus benefits of  the planned procedure, he consented to proceed with incision and  drainage with amputation of all infected tissue. All risks versus  benefits of the planned procedure discussed with the patient in detail. All questions were answered, no guarantees were given.     DESCRIPTION OF PROCEDURE:  The patient was brought from the preoperative  area and placed on the operating room table in the supine position. Following induction of IV sedation, a local block consisting a total of  20 mL of 2% lidocaine plain administered to ankle in ring block fashion. The patient's right lower extremity was then scrubbed, prepped, and  draped in the usual sterile manner. A timeout was performed. The  patient, procedure, and operative site were confirmed and the following  procedure was then performed. INCISION AND DRAINAGE DOWN TO BONE CORTEX WITH PARTIAL FIFTH RAY  RESECTION, RIGHT FOOT:  At this time, attention was directed to the  patient's right foot, where a ulceration was noted to be present. At  the time, the ulceration was circumscribed with #15 blade and excised. Upon completion, the fifth metatarsal head was visualized completely and  was noted to be necrotic with erosions. At this time, a racket-type  incision ellipsing out. The rest of the ulceration was frayed and the  digit was disarticulated at the fifth metatarsophalangeal joint. It was  extirpated in toto from the surgical site. Dissection was carried down  along the lateral aspect of the shaft of fifth metatarsal until adequate  soft tissue exposure had been achieved. It has been osteotomized at the  level that corresponded to the infected bone on MRI. An additional 5 mL  of bone was taken and sent as a clearance fragment separately. Upon  completion of that, the wound was then irrigated with 3 L of normal  sterile saline via pulse lavage. Prior to irrigation, all capsular and  tendon structures were debrided from the wound and hemostasis was  achieved with electrocauterization. Attention was now directed towards  wound closure. After hemostasis was achieved, the subcutaneous tissues  were closed utilizing 3-0 Vicryl sutures. The dog ears were removed  distally where the ulcer was excised and the digit was amputated, so it  came together without any tension.   The skin was then closed utilizing  3-0 nylon suture in a simple interrupted suture technique. Upon  completion of wound closure, a postoperative injection consisting a  total of 30 mL of 0.5% Marcaine plain was administered to all the  surgical incision sites. The wound was then dressed with Betadine  ointment, sterile Adaptic, sterile 4x4s, 4x8s, Kerlix, and Олег. Prior  to application the rest of the dressing, it was noted that the needle  sites were bleeding, so Gelfoam was placed over them for hemostasis. A  Coto compressive dry dressing was applied to the patient's right lower  extremity. The patient tolerated the procedure and anesthesia well. The patient  left the OR with vital signs stable and vascular status intact to all  digits of the remaining digits of the right foot. Following a period of  postoperative monitoring, the patient will be sent back to his in-house  room where he will receive IV antibiotic therapy per the recommendation  of Dr. Santos Lopez. The resection was thought to be definitive,  however, we will wait for the clearance fragment to come back to ensure  all infected bone has been resected.         Ej Hunt DPM    D: 09/02/2022 19:02:49       T: 09/02/2022 23:55:16     [email or web address]  Job#: [id number]     Doc#: [id number]    CC:  Lorena La DPM

## 2022-09-03 NOTE — PROGRESS NOTES
Podiatric Surgery Daily Progress Note      Admit Date: 8/30/2022      Code:Full Code    Patient seen and examined, labs and records reviewed    Subjective:     Patient seen at bedside this AM.  Patient denies any overnight acute event. Patient denies f/c/n/v/sob/cp. Review of Systems: A 12 point review of symptoms is unremarkable with the exception of the chief complaint. Patient specifically denies nausea, fever, vomiting, chills, shortness of breath, chest pain, abdominal pain, constipation or difficulty urinating. Objective     /75   Pulse 77   Temp 98.5 °F (36.9 °C) (Oral)   Resp 18   Ht 6' (1.829 m)   Wt 224 lb 13.9 oz (102 kg)   SpO2 93%   BMI 30.50 kg/m²      I/O:  Intake/Output Summary (Last 24 hours) at 9/3/2022 1131  Last data filed at 9/3/2022 0056  Gross per 24 hour   Intake 530 ml   Output 2125 ml   Net -1595 ml              Wt Readings from Last 3 Encounters:   09/03/22 224 lb 13.9 oz (102 kg)   08/31/22 225 lb (102.1 kg)   07/07/22 218 lb 7.6 oz (99.1 kg)       LABS:    Recent Labs     09/02/22  0737 09/02/22  1356 09/03/22  0518   WBC 7.3  --  8.3   HGB 10.6* 11.5* 10.4*   HCT 32.3* 36.1* 31.6*     --  195        Recent Labs     09/03/22  0518      K 4.7      CO2 29   PHOS 3.7   BUN 35*   CREATININE 1.0        Recent Labs     09/02/22  0736   INR 1.12       LEFT LOWER EXTREMITY EXAMINATION  Dressing to right lower extremity left clean, dry, and intact. No strikethrough noted to external dressing. Dressing to left LE intact. No strikethrough noted to the external dressing. Mild sanguinous drainage noted to the internal layers of the dressing. VASCULAR: DP and PT pulses are nonpalpable 0/4. Upon hand-held Doppler examination were found to be biphasic. CFT is brisk to the remaining digits of the foot. Skin temperature is warm to cool from proximal to distal.  No edema noted. No pain with calf compression.     NEUROLOGIC: Gross and epicritic sensation is intact. Protective sensation is diminished at all pedal sites. DERMATOLOGIC:  Scattered abrasions noted to the anterior aspect of the left leg just distal to the tibial tuberosity. Incision site on the lateral aspect of the left foot is noted to be well coapted. No fluctuance, crepitus, or malodor noted. No acute signs of infection noted. MUSCULOSKELETAL: Muscle strength is 4/5 for all pedal groups tested. No pain with palpation of the foot or ankle. Ankle joint ROM is decreased in dorsiflexion with the knee extended. History of left fifth ray amputation. IMAGING:  XR right foot (9/2/2022)   Narrative   History: Status post incision and drainage. Right foot infection. 3 views right foot. FINDINGS: Previous amputation of the fifth digit to the proximal aspect of the fifth metatarsal. Soft tissue swelling. Severe demineralization. No acute fracture identified. Deformity of the first metatarsal suggesting remote injury. No acute    complication identified. Impression   1. Fifth digit amputation to the proximal fifth metatarsal. No acute complication identified. 2. Osteopenia. XR right foot (8/30/2022)  Narrative   4 views of the right tibia/fibula, 2 views of the right foot       HISTORY: Pain. FINDINGS:       In the right tibia/fibula. There is osteopenia. No definite periosteal bone formation or cortical destruction is seen. There are vascular calcifications. Within the foot, there is diffuse osteopenia. The digits are not well assessed on this examination. There appears to be a fracture in the second metatarsal shaft, incompletely assessed on this exam. Soft tissue swelling is present throughout the foot. Impression   1. Suspected fracture of the second metatarsal shaft, incompletely assessed in this study. 2. Diffuse osteopenia in the tibia and fibula as well as the foot. Soft tissue swelling in the foot. 3. Incomplete assessment of the digits. MRI Right foot (8/31/2022)  Narrative   MRI right foot without contrast       HISTORY: Osteomyelitis. Ulceration. COMPARISON: August 30, 2022. FINDINGS:       The study is significantly limited due to patient motion. A skin ulcer is visible overlying the region of the fifth metatarsal head. There is marked marrow edema with corresponding T1 signal hypointensity in the distal aspect of the fifth metatarsal as well as within the fifth proximal phalanx. No definite    loculated fluid collection identified. There is nonspecific marrow edema within the first proximal phalanx as well as third middle phalanx. There is diffuse subcutaneous edema within the foot. There is atrophy within the intrinsic musculature of the foot as well. No definite infectious tenosynovitis. Impression   1. Extremely limited study due to motion. 2. Findings suspicious for acute osteomyelitis involving the distal fifth metatarsal as well as the fifth proximal phalanx. 3. Diffuse subcutaneous edema which could reflect cellulitis or venous stasis. 4. Atrophy within the intrinsic musculature of the foot compatible with diabetic neuropathy. 5. Nonspecific marrow edema within the first proximal phalanx as well as the third middle phalanx. This could reflect infection versus reactive change.          ASSESSMENT/PLAN  -s/p right foot incision and drainage with partial fifth ray amputation (DOS 9/2/2022)  -Cellulitis right lower extremity-resolved  -Hematoma, right lower extremity-improving  -Superficial abrasions, bilateral lower extremities  -Peripheral neuropathy, bilateral lower extremities  -s/p left foot partial 5th ray resection with graft application (DOS 10/89/6975)    -Patient seen and examined at bedside this AM  -VSS, No leukocytosis noted (WBC 8.3)  -ESR 56 and CRP 25.4  -HbA1c 5.4  -prealbumin 17.4; continue with oral nutritional supplement  -Blood cultures 1 & 2 NGTD  -Imaging reviewed, impression noted above  -Wound culture: MRSA, Klebsiella pneumonia, Klebsiella aerogenes  -Surgical path pending  -Continue IV antibiotics per ID recs  -ID following; recommend linezolid and ceftriaxone  -Left lower extremity dressed with Adaptic, DSD, and Ace with gentle compression  -Prevalon boots reapplied. Patient is to wear at all times while in bed to prevent further deep tissue injury.  -Patient is nonweightbearing at baseline    DISPO: S/p right foot incision and drainage with partial fifth ray amputation. Procedure was felt to be definitive. Clearance fragment from the OR sent to pathology. Patient is okay to discharge from podiatric standpoint pending medical clearance and final ID recs.     Patient seen and evaluated at the bedside with Dr. Zay Carreno DPM.    Miranda Rhoades DPM   Podiatric Resident PGY2  Pager [phone] or Ramandeep  9/3/2022, 11:31 AM

## 2022-09-03 NOTE — PROGRESS NOTES
Progress Note    Admit Date: 8/30/2022  Day: 2  Diet: ADULT DIET; Regular; 4 carb choices (60 gm/meal); Low Fat/Low Chol/High Fiber/2 gm Na  ADULT ORAL NUTRITION SUPPLEMENT; Breakfast, Dinner; Wound Healing Oral Supplement    CC: leg pain    Interval history: OR yesterday for I&D and partial 5th ray resection. No new or worsening symptoms. HPI: 27-year-old man with a past medical history of GERD, congestive heart failure, neurogenic bladder with chronic Iglesias, UTI, A fib, history of lower extremity venous wounds presented with right lower extremity pain and swelling. The patient was getting wound care at his nursing home ([facility]) when his nurse told him that his foot looks infected and he needs to go to the ED as his wound looked infected. The patient also reports that he had a temperature at his nursing home with a T-max being 101.2. In the ED the patient had normal vitals, grossly normal CBC and BMP. His CRP was elevated at 25 and his ESR was elevated at 56. X-ray of the right lower extremity showed soft tissue swelling but no cortical bone loss or gas. The patient reports that he does recall pain in the right lower leg starting 1 to 2 days ago but beyond that is unable to provide much of a history. He denies chest pain, shortness of breath, abdominal pain, headache, sensory loss, motor weakness.     Medications:     Scheduled Meds:   pantoprazole  40 mg IntraVENous Daily    aspirin  81 mg Oral Daily    heparin (porcine)  5,000 Units SubCUTAneous 3 times per day    cefTRIAXone (ROCEPHIN) IV  2,000 mg IntraVENous Q24H    linezolid  600 mg Oral 2 times per day    traZODone  100 mg Oral Nightly    sodium chloride flush  5-40 mL IntraVENous 2 times per day    atorvastatin  80 mg Oral Nightly    furosemide  40 mg Oral Daily    gabapentin  600 mg Oral BID    levothyroxine  75 mcg Oral Daily    melatonin  5 mg Oral Nightly    tamsulosin  0.8 mg Oral Daily    vitamin B-12  1,000 mcg Oral Daily Continuous Infusions:   sodium chloride       PRN Meds:sodium chloride flush, sodium chloride, ondansetron **OR** ondansetron, polyethylene glycol, acetaminophen **OR** acetaminophen    Objective:   Vitals:   T-max:  Patient Vitals for the past 8 hrs:   BP Temp Temp src Pulse Resp SpO2   09/03/22 0427 108/65 97.4 °F (36.3 °C) Oral 76 16 93 %   09/03/22 0030 -- -- -- -- -- 95 %         Intake/Output Summary (Last 24 hours) at 9/3/2022 0728  Last data filed at 9/3/2022 0056  Gross per 24 hour   Intake 530 ml   Output 2125 ml   Net -1595 ml       Review of Systems   Constitutional:  Negative for activity change and appetite change. HENT:  Negative for dental problem, drooling, ear discharge, sinus pressure, sore throat and tinnitus. Respiratory:  Negative for apnea, cough and shortness of breath. Cardiovascular:  Negative for chest pain and leg swelling. Gastrointestinal:  Negative for abdominal distention. Genitourinary:  Negative for difficulty urinating, dysuria, frequency, genital sores, hematuria and penile pain. Skin:  Negative for color change. Neurological:  Negative for speech difficulty, numbness and headaches. Psychiatric/Behavioral:  Negative for agitation, behavioral problems, confusion, self-injury and suicidal ideas. Physical Exam  Constitutional:       Appearance: Normal appearance. HENT:      Head: Normocephalic and atraumatic. Nose: Nose normal.      Mouth/Throat:      Mouth: Mucous membranes are moist.   Eyes:      Extraocular Movements: Extraocular movements intact. Pupils: Pupils are equal, round, and reactive to light. Cardiovascular:      Rate and Rhythm: Normal rate and regular rhythm. Heart sounds:     No gallop. Pulmonary:      Effort: No respiratory distress. Breath sounds: No wheezing or rales. Abdominal:      General: There is no distension. Palpations: There is no mass. Tenderness: There is no abdominal tenderness.  There is no rebound. Hernia: No hernia is present. Musculoskeletal:         General: Tenderness present. No swelling. Right lower leg: Edema present. Left lower leg: Edema present. Skin:     General: Skin is warm. Capillary Refill: Capillary refill takes less than 2 seconds. Neurological:      Mental Status: He is alert and oriented to person, place, and time. Cranial Nerves: No cranial nerve deficit. Sensory: No sensory deficit. Motor: No weakness. Gait: Gait normal.   Psychiatric:         Mood and Affect: Mood normal.         Behavior: Behavior normal.         LABS:    CBC:   Recent Labs     09/01/22  0406 09/02/22  0737 09/02/22  1356 09/03/22  0518   WBC 8.1 7.3  --  8.3   HGB 10.4* 10.6* 11.5* 10.4*   HCT 31.9* 32.3* 36.1* 31.6*    173  --  195   MCV 94.3 94.5  --  93.5       Renal:    Recent Labs     09/01/22  0406 09/02/22  0737 09/03/22  0518    138 140   K 4.4 4.4 4.7    102 104   CO2 23 26 29   BUN 38* 41* 35*   CREATININE 0.9 1.1 1.0   GLUCOSE 99 95 144*   CALCIUM 8.0* 8.3 8.2*   MG 2.10 2.10 2.10   PHOS 3.9 4.5 3.7   ANIONGAP 13 10 7       Hepatic:   Recent Labs     09/01/22  0406 09/02/22  0737 09/03/22  0518   LABALBU 2.9* 3.0* 3.2*       Troponin: No results for input(s): TROPONINI in the last 72 hours. BNP: No results for input(s): BNP in the last 72 hours. Lipids: No results for input(s): CHOL, HDL in the last 72 hours. Invalid input(s): LDLCALCU, TRIGLYCERIDE  ABGs:  No results for input(s): PHART, TVH5BVM, PO2ART, KPW3XHY, BEART, THGBART, L4OSQOVP, VEW9WLZ in the last 72 hours. INR:   Recent Labs     09/02/22  0736   INR 1.12       Lactate: No results for input(s): LACTATE in the last 72 hours. Cultures:  -----------------------------------------------------------------  RAD:   XR CHEST (2 VW)   Final Result      1.   Unchanged pleural thickening or small left pleural effusion when compared to the prior exam.      2.  Pulmonary vascular congestion without localized consolidation. MRI FOOT RIGHT WO CONTRAST   Final Result   1. Extremely limited study due to motion. 2. Findings suspicious for acute osteomyelitis involving the distal fifth metatarsal as well as the fifth proximal phalanx. 3. Diffuse subcutaneous edema which could reflect cellulitis or venous stasis. 4. Atrophy within the intrinsic musculature of the foot compatible with diabetic neuropathy. 5. Nonspecific marrow edema within the first proximal phalanx as well as the third middle phalanx. This could reflect infection versus reactive change. XR FOOT RIGHT (MIN 3 VIEWS)   Final Result   1. Suspected fracture of the second metatarsal shaft, incompletely assessed in this study. 2. Diffuse osteopenia in the tibia and fibula as well as the foot. Soft tissue swelling in the foot. 3. Incomplete assessment of the digits. XR TIBIA FIBULA RIGHT (2 VIEWS)   Final Result   1. Suspected fracture of the second metatarsal shaft, incompletely assessed in this study. 2. Diffuse osteopenia in the tibia and fibula as well as the foot. Soft tissue swelling in the foot. 3. Incomplete assessment of the digits. XR FOOT RIGHT (MIN 3 VIEWS)    (Results Pending)       Assessment/Plan:     52-year-old man with a past medical history of GERD, congestive heart failure, neurogenic bladder with chronic Iglesias, UTI, A fib, history of lower extremity venous wounds presented with right lower extremity pain and swelling consistent with cellulitis.     Cellulitis/Osteomyelitis of right lower extremity  - The wound looks infected and he has a history of MRSA growing from his wound on a prior admission  - Blood cultures with no growth to date  - Trend CRP and Pro-Calc  - Podiatry already consulted from the ED  --> f/u postop I&D + partial 5th ray resection recs  -- ID consulted  --> continue linezolid and ceftriaxone  --> waiting on sensitivities for Klebsiella culture, f/u recs for discharge PO abx    Dark tarry stool  Noted by nursing yesterday.  No history of GI bleeding, ulcers, etc.  - positive fecal occult blood, no other source of bleeding  - Stable hgb ~10-11, HDS  - 40 mg IV protonix daily  - consult GI --> f/u recs, if okay to follow up outpatient, okay to dc     CAD s/p CABG in 2013  - aspirin 81 mg daily  -continue lipitor 80 mg nightly    Neuropathy  - Has an intrathecal baclofen pump to alleviate muscle spasticity 2/2 neuropathy managed by [facility]  --> Outpatient follow up being managed by Dr. Allie Leong office     Hypothyroidism   -continue home synthyroid 75 mcg      BPH  -continue home flomax 0.8 mg PO daily   -chronic indwelling foleys catheter      Mood disorders   -continue home trazodone 25 mg PO nightly      Hx of Intertrigo likely 2/2 fungal rash  - Miconazole powder PRN to keep area dry     Code Status: Full code  FEN: Adult  PPX: SQH  DISPO: Robbie Paris MD, PGY-1  09/03/22  7:28 AM    This patient has been staffed and discussed with Dr. Jimenez Cos

## 2022-09-03 NOTE — PROGRESS NOTES
Patient up in bed, using incentive spirometer. A/O x4. Denies pain. Shift assessment complete. Bed alarm on. Denies any further needs at this time.

## 2022-09-03 NOTE — PROGRESS NOTES
[facility]  -  for Dr. Rah Conte  Full note dictated    IMP:  Recent onset of melenic stool  Hgb stable  VSS  Patient without other GI complaints  History of gastritis - EGD in 2020 with Dr. Rah Conte  (+) 81 mg ASA/day; no NSAIDs, tobacco or ethanol    REC:  Continue regular diet  Follow the Hgb  Change pantoprazole to po and increase to 40 mg bid  Hold ASA for now  No indication for urgent EGD

## 2022-09-04 LAB
ALBUMIN SERPL-MCNC: 3 G/DL (ref 3.4–5)
ANION GAP SERPL CALCULATED.3IONS-SCNC: 7 MMOL/L (ref 3–16)
BASOPHILS ABSOLUTE: 0.1 K/UL (ref 0–0.2)
BASOPHILS RELATIVE PERCENT: 1.4 %
BUN BLDV-MCNC: 38 MG/DL (ref 7–20)
C-REACTIVE PROTEIN: 12 MG/L (ref 0–5.1)
CALCIUM SERPL-MCNC: 8.1 MG/DL (ref 8.3–10.6)
CHLORIDE BLD-SCNC: 101 MMOL/L (ref 99–110)
CO2: 27 MMOL/L (ref 21–32)
CREAT SERPL-MCNC: 1.1 MG/DL (ref 0.8–1.3)
EOSINOPHILS ABSOLUTE: 1.1 K/UL (ref 0–0.6)
EOSINOPHILS RELATIVE PERCENT: 12.7 %
GFR AFRICAN AMERICAN: >60
GFR NON-AFRICAN AMERICAN: >60
GLUCOSE BLD-MCNC: 106 MG/DL (ref 70–99)
GRAM STAIN RESULT: ABNORMAL
HCT VFR BLD CALC: 28.8 % (ref 40.5–52.5)
HEMOGLOBIN: 9.8 G/DL (ref 13.5–17.5)
LYMPHOCYTES ABSOLUTE: 1.8 K/UL (ref 1–5.1)
LYMPHOCYTES RELATIVE PERCENT: 21.6 %
MAGNESIUM: 2.3 MG/DL (ref 1.8–2.4)
MCH RBC QN AUTO: 31.7 PG (ref 26–34)
MCHC RBC AUTO-ENTMCNC: 33.9 G/DL (ref 31–36)
MCV RBC AUTO: 93.5 FL (ref 80–100)
MONOCYTES ABSOLUTE: 0.6 K/UL (ref 0–1.3)
MONOCYTES RELATIVE PERCENT: 7.2 %
NEUTROPHILS ABSOLUTE: 4.9 K/UL (ref 1.7–7.7)
NEUTROPHILS RELATIVE PERCENT: 57.1 %
ORGANISM: ABNORMAL
PDW BLD-RTO: 15.2 % (ref 12.4–15.4)
PHOSPHORUS: 3.8 MG/DL (ref 2.5–4.9)
PLATELET # BLD: 188 K/UL (ref 135–450)
PMV BLD AUTO: 8.7 FL (ref 5–10.5)
POTASSIUM SERPL-SCNC: 4.8 MMOL/L (ref 3.5–5.1)
PROCALCITONIN: 0.16 NG/ML (ref 0–0.15)
RBC # BLD: 3.08 M/UL (ref 4.2–5.9)
SODIUM BLD-SCNC: 135 MMOL/L (ref 136–145)
WBC # BLD: 8.5 K/UL (ref 4–11)
WOUND/ABSCESS: ABNORMAL

## 2022-09-04 PROCEDURE — 84145 PROCALCITONIN (PCT): CPT

## 2022-09-04 PROCEDURE — 6360000002 HC RX W HCPCS

## 2022-09-04 PROCEDURE — 6370000000 HC RX 637 (ALT 250 FOR IP)

## 2022-09-04 PROCEDURE — 2580000003 HC RX 258: Performed by: INTERNAL MEDICINE

## 2022-09-04 PROCEDURE — 2580000003 HC RX 258

## 2022-09-04 PROCEDURE — 6360000002 HC RX W HCPCS: Performed by: INTERNAL MEDICINE

## 2022-09-04 PROCEDURE — 36415 COLL VENOUS BLD VENIPUNCTURE: CPT

## 2022-09-04 PROCEDURE — 6370000000 HC RX 637 (ALT 250 FOR IP): Performed by: INTERNAL MEDICINE

## 2022-09-04 PROCEDURE — 80069 RENAL FUNCTION PANEL: CPT

## 2022-09-04 PROCEDURE — 85025 COMPLETE CBC W/AUTO DIFF WBC: CPT

## 2022-09-04 PROCEDURE — 1200000000 HC SEMI PRIVATE

## 2022-09-04 PROCEDURE — 83735 ASSAY OF MAGNESIUM: CPT

## 2022-09-04 PROCEDURE — 86140 C-REACTIVE PROTEIN: CPT

## 2022-09-04 RX ADMIN — ACETAMINOPHEN 650 MG: 325 TABLET ORAL at 14:12

## 2022-09-04 RX ADMIN — GABAPENTIN 600 MG: 600 TABLET, FILM COATED ORAL at 22:56

## 2022-09-04 RX ADMIN — CEFTRIAXONE 2000 MG: 2 INJECTION, POWDER, FOR SOLUTION INTRAMUSCULAR; INTRAVENOUS at 00:09

## 2022-09-04 RX ADMIN — TAMSULOSIN HYDROCHLORIDE 0.8 MG: 0.4 CAPSULE ORAL at 09:15

## 2022-09-04 RX ADMIN — TRAZODONE HYDROCHLORIDE 100 MG: 100 TABLET ORAL at 22:56

## 2022-09-04 RX ADMIN — LINEZOLID 600 MG: 600 TABLET, FILM COATED ORAL at 09:16

## 2022-09-04 RX ADMIN — PANTOPRAZOLE SODIUM 40 MG: 40 TABLET, DELAYED RELEASE ORAL at 06:28

## 2022-09-04 RX ADMIN — LEVOTHYROXINE SODIUM 75 MCG: 0.07 TABLET ORAL at 06:45

## 2022-09-04 RX ADMIN — GABAPENTIN 600 MG: 600 TABLET, FILM COATED ORAL at 09:15

## 2022-09-04 RX ADMIN — ATORVASTATIN CALCIUM 80 MG: 40 TABLET, FILM COATED ORAL at 22:56

## 2022-09-04 RX ADMIN — FUROSEMIDE 40 MG: 40 TABLET ORAL at 09:16

## 2022-09-04 RX ADMIN — PANTOPRAZOLE SODIUM 40 MG: 40 TABLET, DELAYED RELEASE ORAL at 17:00

## 2022-09-04 RX ADMIN — Medication 5 MG: at 22:56

## 2022-09-04 RX ADMIN — HEPARIN SODIUM 5000 UNITS: 5000 INJECTION INTRAVENOUS; SUBCUTANEOUS at 14:05

## 2022-09-04 RX ADMIN — ACETAMINOPHEN 650 MG: 325 TABLET ORAL at 22:56

## 2022-09-04 RX ADMIN — SODIUM CHLORIDE, PRESERVATIVE FREE 10 ML: 5 INJECTION INTRAVENOUS at 23:42

## 2022-09-04 RX ADMIN — SODIUM CHLORIDE, PRESERVATIVE FREE 10 ML: 5 INJECTION INTRAVENOUS at 09:16

## 2022-09-04 RX ADMIN — SODIUM CHLORIDE 25 ML: 9 INJECTION, SOLUTION INTRAVENOUS at 00:07

## 2022-09-04 RX ADMIN — LINEZOLID 600 MG: 600 TABLET, FILM COATED ORAL at 22:56

## 2022-09-04 RX ADMIN — CYANOCOBALAMIN TAB 1000 MCG 1000 MCG: 1000 TAB at 09:16

## 2022-09-04 RX ADMIN — HEPARIN SODIUM 5000 UNITS: 5000 INJECTION INTRAVENOUS; SUBCUTANEOUS at 06:28

## 2022-09-04 RX ADMIN — CEFTRIAXONE 2000 MG: 2 INJECTION, POWDER, FOR SOLUTION INTRAMUSCULAR; INTRAVENOUS at 10:08

## 2022-09-04 ASSESSMENT — PAIN DESCRIPTION - ORIENTATION
ORIENTATION: RIGHT
ORIENTATION: RIGHT;LEFT
ORIENTATION: RIGHT
ORIENTATION: RIGHT

## 2022-09-04 ASSESSMENT — PAIN SCALES - GENERAL
PAINLEVEL_OUTOF10: 3
PAINLEVEL_OUTOF10: 2
PAINLEVEL_OUTOF10: 3
PAINLEVEL_OUTOF10: 4
PAINLEVEL_OUTOF10: 3

## 2022-09-04 ASSESSMENT — PAIN - FUNCTIONAL ASSESSMENT
PAIN_FUNCTIONAL_ASSESSMENT: ACTIVITIES ARE NOT PREVENTED
PAIN_FUNCTIONAL_ASSESSMENT: ACTIVITIES ARE NOT PREVENTED

## 2022-09-04 ASSESSMENT — PAIN DESCRIPTION - LOCATION
LOCATION: FOOT
LOCATION: TOE (COMMENT WHICH ONE)

## 2022-09-04 ASSESSMENT — PAIN DESCRIPTION - FREQUENCY
FREQUENCY: CONTINUOUS
FREQUENCY: CONTINUOUS

## 2022-09-04 ASSESSMENT — PAIN DESCRIPTION - ONSET
ONSET: ON-GOING
ONSET: ON-GOING

## 2022-09-04 ASSESSMENT — PAIN DESCRIPTION - DESCRIPTORS
DESCRIPTORS: BURNING
DESCRIPTORS: ACHING
DESCRIPTORS: BURNING
DESCRIPTORS: ACHING

## 2022-09-04 ASSESSMENT — PAIN DESCRIPTION - PAIN TYPE
TYPE: SURGICAL PAIN
TYPE: CHRONIC PAIN
TYPE: CHRONIC PAIN
TYPE: SURGICAL PAIN

## 2022-09-04 NOTE — PROGRESS NOTES
Podiatric Surgery Daily Progress Note      Admit Date: 8/30/2022      Code:Full Code    Patient seen and examined, labs and records reviewed    Subjective:     Patient seen at bedside this AM.  Patient reports some burning to his right lower extremity. Patient denies any overnight acute event. Patient denies f/c/n/v/sob/cp. Review of Systems: A 12 point review of symptoms is unremarkable with the exception of the chief complaint. Patient specifically denies nausea, fever, vomiting, chills, shortness of breath, chest pain, abdominal pain, constipation or difficulty urinating. Objective     BP (!) 110/54   Pulse 60   Temp 98.3 °F (36.8 °C) (Oral)   Resp 18   Ht 6' (1.829 m)   Wt 216 lb 14.9 oz (98.4 kg)   SpO2 94%   BMI 29.42 kg/m²      I/O:  Intake/Output Summary (Last 24 hours) at 9/4/2022 1110  Last data filed at 9/4/2022 0615  Gross per 24 hour   Intake 360 ml   Output 1450 ml   Net -1090 ml              Wt Readings from Last 3 Encounters:   09/04/22 216 lb 14.9 oz (98.4 kg)   08/31/22 225 lb (102.1 kg)   07/07/22 218 lb 7.6 oz (99.1 kg)       LABS:    Recent Labs     09/03/22  0518 09/04/22  0547   WBC 8.3 8.5   HGB 10.4* 9.8*   HCT 31.6* 28.8*    188        Recent Labs     09/04/22  0547   *   K 4.8      CO2 27   PHOS 3.8   BUN 38*   CREATININE 1.1        Recent Labs     09/02/22  0736   INR 1.12       LEFT LOWER EXTREMITY EXAMINATION  Dressing to right lower extremity left clean, dry, and intact. No strikethrough noted to external dressing. Dressing to left LE intact. No strikethrough noted to the external dressing. Mild sanguinous drainage noted to the internal layers of the dressing. VASCULAR: DP and PT pulses are nonpalpable 0/4. Upon hand-held Doppler examination were found to be biphasic. CFT is brisk to the remaining digits of the foot. Skin temperature is warm to cool from proximal to distal.  No edema noted. No pain with calf compression.     NEUROLOGIC: Gross and epicritic sensation is intact. Protective sensation is diminished at all pedal sites. DERMATOLOGIC:  Scattered abrasions noted to the anterior aspect of the left leg just distal to the tibial tuberosity. Incision site on the lateral aspect of the left foot is noted to be well coapted. No fluctuance, crepitus, or malodor noted. No acute signs of infection noted. MUSCULOSKELETAL: Muscle strength is 4/5 for all pedal groups tested. No pain with palpation of the foot or ankle. Ankle joint ROM is decreased in dorsiflexion with the knee extended. History of left fifth ray amputation. IMAGING:  XR right foot (9/2/2022)   Narrative   History: Status post incision and drainage. Right foot infection. 3 views right foot. FINDINGS: Previous amputation of the fifth digit to the proximal aspect of the fifth metatarsal. Soft tissue swelling. Severe demineralization. No acute fracture identified. Deformity of the first metatarsal suggesting remote injury. No acute    complication identified. Impression   1. Fifth digit amputation to the proximal fifth metatarsal. No acute complication identified. 2. Osteopenia. XR right foot (8/30/2022)  Narrative   4 views of the right tibia/fibula, 2 views of the right foot       HISTORY: Pain. FINDINGS:       In the right tibia/fibula. There is osteopenia. No definite periosteal bone formation or cortical destruction is seen. There are vascular calcifications. Within the foot, there is diffuse osteopenia. The digits are not well assessed on this examination. There appears to be a fracture in the second metatarsal shaft, incompletely assessed on this exam. Soft tissue swelling is present throughout the foot. Impression   1. Suspected fracture of the second metatarsal shaft, incompletely assessed in this study. 2. Diffuse osteopenia in the tibia and fibula as well as the foot. Soft tissue swelling in the foot.     3. Incomplete assessment of the digits. MRI Right foot (8/31/2022)  Narrative   MRI right foot without contrast       HISTORY: Osteomyelitis. Ulceration. COMPARISON: August 30, 2022. FINDINGS:       The study is significantly limited due to patient motion. A skin ulcer is visible overlying the region of the fifth metatarsal head. There is marked marrow edema with corresponding T1 signal hypointensity in the distal aspect of the fifth metatarsal as well as within the fifth proximal phalanx. No definite    loculated fluid collection identified. There is nonspecific marrow edema within the first proximal phalanx as well as third middle phalanx. There is diffuse subcutaneous edema within the foot. There is atrophy within the intrinsic musculature of the foot as well. No definite infectious tenosynovitis. Impression   1. Extremely limited study due to motion. 2. Findings suspicious for acute osteomyelitis involving the distal fifth metatarsal as well as the fifth proximal phalanx. 3. Diffuse subcutaneous edema which could reflect cellulitis or venous stasis. 4. Atrophy within the intrinsic musculature of the foot compatible with diabetic neuropathy. 5. Nonspecific marrow edema within the first proximal phalanx as well as the third middle phalanx. This could reflect infection versus reactive change.          ASSESSMENT/PLAN  -s/p right foot incision and drainage with partial fifth ray amputation (DOS 9/2/2022)  -Cellulitis right lower extremity-resolved  -Hematoma, right lower extremity-improving  -Superficial abrasions, bilateral lower extremities  -Peripheral neuropathy, bilateral lower extremities  -s/p left foot partial 5th ray resection with graft application (DOS 47/96/8891)    -Patient seen and examined at bedside this AM  -Hypotensive otherwise VSS, No leukocytosis noted (WBC 8.5)  -ESR 56 and CRP 12.0  -HbA1c 5.4  -prealbumin 17.4; continue with oral nutritional supplement  -Blood cultures 1 & 2 NGTD  -Imaging reviewed, impression noted above  -Wound culture: MRSA, Klebsiella pneumonia, Klebsiella aerogenes  -Surgical path pending  -Continue antibiotics per ID recs  -ID following; recommend linezolid and ceftriaxone  -Left lower extremity dressed with Adaptic, gauze, and tape  -Prevalon boots reapplied. Patient is to wear at all times while in bed to prevent further deep tissue injury.  -Patient is nonweightbearing at baseline    DISPO: S/p right foot incision and drainage with partial fifth ray amputation. Procedure was felt to be definitive. Clearance fragment from the OR sent to pathology. Patient is okay to discharge from podiatric standpoint pending medical clearance and final ID recs.     Patient seen and evaluated at the bedside with Dr. Juan Carlos Dunn DPM.    Stacy Sibley DPM   Podiatric Resident PGY2  Pager [phone] or [employer]  9/4/2022, 11:10 AM

## 2022-09-04 NOTE — PROGRESS NOTES
[facility]  -  for Dr. Stacey Josue    Afebrile   VSS  Hgb 9.8  Patient feels well    REC:  Continue acid suppression and follow the Hgb

## 2022-09-04 NOTE — PROGRESS NOTES
Progress Note    Admit Date: 8/30/2022  Diet: ADULT DIET; Regular; 4 carb choices (60 gm/meal); Low Fat/Low Chol/High Fiber/2 gm Na  ADULT ORAL NUTRITION SUPPLEMENT; Breakfast, Dinner; Wound Healing Oral Supplement    CC: leg pain    Interval history: NAEON. Pt endorsed some abdominal distention after meals that resolves. Pt denies melena and hematochezia. Medications:     Scheduled Meds:   cefTRIAXone (ROCEPHIN) IV  2,000 mg IntraVENous Q24H    pantoprazole  40 mg Oral BID AC    heparin (porcine)  5,000 Units SubCUTAneous 3 times per day    linezolid  600 mg Oral 2 times per day    traZODone  100 mg Oral Nightly    sodium chloride flush  5-40 mL IntraVENous 2 times per day    atorvastatin  80 mg Oral Nightly    furosemide  40 mg Oral Daily    gabapentin  600 mg Oral BID    levothyroxine  75 mcg Oral Daily    melatonin  5 mg Oral Nightly    tamsulosin  0.8 mg Oral Daily    vitamin B-12  1,000 mcg Oral Daily     Continuous Infusions:   sodium chloride 25 mL (09/04/22 0007)     PRN Meds:sodium chloride flush, sodium chloride, ondansetron **OR** ondansetron, polyethylene glycol, acetaminophen **OR** acetaminophen    Objective:   Vitals:   T-max:  Patient Vitals for the past 8 hrs:   BP Temp Temp src Pulse Resp SpO2   09/04/22 1413 (!) 102/57 97.8 °F (36.6 °C) Oral 72 18 93 %   09/04/22 0915 (!) 110/54 98.3 °F (36.8 °C) Oral 60 18 94 %         Intake/Output Summary (Last 24 hours) at 9/4/2022 1510  Last data filed at 9/4/2022 1413  Gross per 24 hour   Intake 840 ml   Output 1600 ml   Net -760 ml       Review of Systems   Constitutional:  Negative for activity change and appetite change, fever or chills. Respiratory:  Negative for cough and shortness of breath. Cardiovascular:  Negative for chest pain or palpitations. Gastrointestinal:  Positive for abdominal distention. Negative for abdominal pain, N/V/D, hematochezia, and melena. Genitourinary:  Negative for dysuria.   Skin:  Positive for erythema of RLE.    Physical Exam  Constitutional:       Appearance: Normal appearance. Alert, and in no acute disstress. HENT:      Head: Normocephalic and atraumatic. Eyes:    EOMI  Cardiovascular: Positive mumur, negative for rubs or gallop. Pulmonary:      Effort: No respiratory distress. Breath sounds: No wheezing, rales, or rhonchi. Abdominal:    Non-distended, non-tender, and NL bowel sounds present. Musculoskeletal:         erythema in RLE, see picture below. Skin:     General: Skin is warm. LABS:    CBC:   Recent Labs     09/02/22  0737 09/02/22  1356 09/03/22  0518 09/04/22  0547   WBC 7.3  --  8.3 8.5   HGB 10.6* 11.5* 10.4* 9.8*   HCT 32.3* 36.1* 31.6* 28.8*     --  195 188   MCV 94.5  --  93.5 93.5       Renal:    Recent Labs     09/02/22  0737 09/03/22  0518 09/04/22  0547    140 135*   K 4.4 4.7 4.8    104 101   CO2 26 29 27   BUN 41* 35* 38*   CREATININE 1.1 1.0 1.1   GLUCOSE 95 144* 106*   CALCIUM 8.3 8.2* 8.1*   MG 2.10 2.10 2.30   PHOS 4.5 3.7 3.8   ANIONGAP 10 7 7       Hepatic:   Recent Labs     09/02/22  0737 09/03/22  0518 09/04/22  0547   LABALBU 3.0* 3.2* 3.0*       Troponin: No results for input(s): TROPONINI in the last 72 hours. BNP: No results for input(s): BNP in the last 72 hours. Lipids: No results for input(s): CHOL, HDL in the last 72 hours. Invalid input(s): LDLCALCU, TRIGLYCERIDE  ABGs:  No results for input(s): PHART, QGP6JBW, PO2ART, HWO0TDU, BEART, THGBART, L5ZKEHZF, OVU9LNP in the last 72 hours. INR:   Recent Labs     09/02/22  0736   INR 1.12       Lactate: No results for input(s): LACTATE in the last 72 hours. Cultures:  -----------------------------------------------------------------  RAD:   XR FOOT RIGHT (MIN 3 VIEWS)   Final Result   1. Fifth digit amputation to the proximal fifth metatarsal. No acute complication identified. 2. Osteopenia. XR CHEST (2 VW)   Final Result      1.   Unchanged pleural thickening or small left pleural effusion when compared to the prior exam.      2.  Pulmonary vascular congestion without localized consolidation. MRI FOOT RIGHT WO CONTRAST   Final Result   1. Extremely limited study due to motion. 2. Findings suspicious for acute osteomyelitis involving the distal fifth metatarsal as well as the fifth proximal phalanx. 3. Diffuse subcutaneous edema which could reflect cellulitis or venous stasis. 4. Atrophy within the intrinsic musculature of the foot compatible with diabetic neuropathy. 5. Nonspecific marrow edema within the first proximal phalanx as well as the third middle phalanx. This could reflect infection versus reactive change. XR FOOT RIGHT (MIN 3 VIEWS)   Final Result   1. Suspected fracture of the second metatarsal shaft, incompletely assessed in this study. 2. Diffuse osteopenia in the tibia and fibula as well as the foot. Soft tissue swelling in the foot. 3. Incomplete assessment of the digits. XR TIBIA FIBULA RIGHT (2 VIEWS)   Final Result   1. Suspected fracture of the second metatarsal shaft, incompletely assessed in this study. 2. Diffuse osteopenia in the tibia and fibula as well as the foot. Soft tissue swelling in the foot. 3. Incomplete assessment of the digits. Assessment/Plan:   52-year-old man with a past medical history of GERD, congestive heart failure, neurogenic bladder with chronic Iglesias, UTI, A fib, history of lower extremity venous wounds presented with right lower extremity pain and swelling consistent with cellulitis. Cellulitis/Osteomyelitis of right lower extremity  RLE cellulitis, osteomyelitis, and is s/p amputation of RLE 5th digit. Wound culture positive for MRSA, Klebsiiella P., and Klebsiella A.    -Abx: Zyvox, Rocephin  -Podiatry following    Melena rule out  Hx GERD.  Melena observed by RN, and is FOBT positive.     -Hgb trending downward  -Will monitor for another day  -Per GI, no need for EGD  -Protonix 40 mg PO BID  -GI following     Chronic Medical Conditions    CAD: s/p CABG in 2013, Cont home ASA, and Lipitor. Neuropathy: Likely 2/2 spondylolisthesis, intrathecal baclofen pump interrogated during visit. Will need replacement w/ Dr. Kasey Buck () by 10/2022. Hypothyroidism: synthyroid 75 mcg    BPH: Flowmax 0.8 mg PO daily, has chronic indwelling foleys catheter   Insomnia:Trazodone 25 mg PO qHS    Code Status: Full code  FEN: ADULT DIET; Regular; 4 carb choices (60 gm/meal); Low Fat/Low Chol/High Fiber/2 gm Na  ADULT ORAL NUTRITION SUPPLEMENT; Breakfast, Dinner;  Wound Healing Oral Supplement   PPX: SQH  DISPO: IP    Alessandro Rueda DO, PGY-2  09/04/22  3:10 PM    This patient has been staffed and discussed with Dr. Desire Dalton

## 2022-09-04 NOTE — PROGRESS NOTES
Secure message sent to medical resident to request order for miconazole powder for redness in groin area.

## 2022-09-04 NOTE — PLAN OF CARE
Problem: Skin/Tissue Integrity  Goal: Absence of new skin breakdown  9/4/2022 0806 by Cain Heimlich, RN  Outcome: Progressing  Turn and reposition q 2 hrs. Problem: Discharge Planning  Goal: Discharge to home or other facility with appropriate resources  9/4/2022 0806 by Cain Heimlich, RN  Outcome: Progressing  Plan of care reviewed with pt. All questions answered at this time. Problem: Pain  Goal: Verbalizes/displays adequate comfort level or baseline comfort level  9/4/2022 0806 by Cain Heimlich, RN  Outcome: Progressing  Pt denies pain at this time. Will continue to assess pain on 0-10 scale for appropriate pain interventions.

## 2022-09-04 NOTE — PLAN OF CARE
Pt A/O, denies SOB or dizziness, and VSS. Meds given per eMAR. Pt complained of pain in R foot and was given PRN tylenol. Pt is resting with no other complaints at this time. Call light within reach. Problem: Skin/Tissue Integrity  Goal: Absence of new skin breakdown  Description: 1. Monitor for areas of redness and/or skin breakdown  2. Assess vascular access sites hourly  3. Every 4-6 hours minimum:  Change oxygen saturation probe site  4. Every 4-6 hours:  If on nasal continuous positive airway pressure, respiratory therapy assess nares and determine need for appliance change or resting period.   Outcome: Progressing     Problem: Chronic Conditions and Co-morbidities  Goal: Patient's chronic conditions and co-morbidity symptoms are monitored and maintained or improved  Outcome: Progressing  Flowsheets (Taken 9/3/2022 2044)  Care Plan - Patient's Chronic Conditions and Co-Morbidity Symptoms are Monitored and Maintained or Improved: Monitor and assess patient's chronic conditions and comorbid symptoms for stability, deterioration, or improvement     Problem: Discharge Planning  Goal: Discharge to home or other facility with appropriate resources  Outcome: Progressing  Flowsheets (Taken 9/3/2022 2044)  Discharge to home or other facility with appropriate resources: Identify barriers to discharge with patient and caregiver     Problem: Pain  Goal: Verbalizes/displays adequate comfort level or baseline comfort level  Outcome: Progressing     Problem: Safety - Adult  Goal: Free from fall injury  Outcome: Progressing  Flowsheets (Taken 9/3/2022 2044)  Free From Fall Injury: Instruct family/caregiver on patient safety

## 2022-09-05 LAB
ALBUMIN SERPL-MCNC: 3 G/DL (ref 3.4–5)
ANION GAP SERPL CALCULATED.3IONS-SCNC: 9 MMOL/L (ref 3–16)
BASOPHILS ABSOLUTE: 0.1 K/UL (ref 0–0.2)
BASOPHILS RELATIVE PERCENT: 0.8 %
BUN BLDV-MCNC: 44 MG/DL (ref 7–20)
CALCIUM SERPL-MCNC: 8.2 MG/DL (ref 8.3–10.6)
CHLORIDE BLD-SCNC: 104 MMOL/L (ref 99–110)
CO2: 28 MMOL/L (ref 21–32)
CREAT SERPL-MCNC: 1.2 MG/DL (ref 0.8–1.3)
EOSINOPHILS ABSOLUTE: 1.1 K/UL (ref 0–0.6)
EOSINOPHILS RELATIVE PERCENT: 14.8 %
GFR AFRICAN AMERICAN: >60
GFR NON-AFRICAN AMERICAN: 58
GLUCOSE BLD-MCNC: 94 MG/DL (ref 70–99)
HCT VFR BLD CALC: 30 % (ref 40.5–52.5)
HEMOGLOBIN: 9.6 G/DL (ref 13.5–17.5)
LYMPHOCYTES ABSOLUTE: 1.8 K/UL (ref 1–5.1)
LYMPHOCYTES RELATIVE PERCENT: 23 %
MCH RBC QN AUTO: 30.4 PG (ref 26–34)
MCHC RBC AUTO-ENTMCNC: 32.1 G/DL (ref 31–36)
MCV RBC AUTO: 94.7 FL (ref 80–100)
MONOCYTES ABSOLUTE: 0.6 K/UL (ref 0–1.3)
MONOCYTES RELATIVE PERCENT: 7.2 %
NEUTROPHILS ABSOLUTE: 4.2 K/UL (ref 1.7–7.7)
NEUTROPHILS RELATIVE PERCENT: 54.2 %
PDW BLD-RTO: 15.2 % (ref 12.4–15.4)
PHOSPHORUS: 4.3 MG/DL (ref 2.5–4.9)
PLATELET # BLD: 187 K/UL (ref 135–450)
PMV BLD AUTO: 8.8 FL (ref 5–10.5)
POTASSIUM SERPL-SCNC: 5 MMOL/L (ref 3.5–5.1)
RBC # BLD: 3.17 M/UL (ref 4.2–5.9)
SODIUM BLD-SCNC: 141 MMOL/L (ref 136–145)
WBC # BLD: 7.7 K/UL (ref 4–11)

## 2022-09-05 PROCEDURE — 3609012400 HC EGD TRANSORAL BIOPSY SINGLE/MULTIPLE: Performed by: INTERNAL MEDICINE

## 2022-09-05 PROCEDURE — 6370000000 HC RX 637 (ALT 250 FOR IP): Performed by: INTERNAL MEDICINE

## 2022-09-05 PROCEDURE — 6360000002 HC RX W HCPCS

## 2022-09-05 PROCEDURE — 88342 IMHCHEM/IMCYTCHM 1ST ANTB: CPT

## 2022-09-05 PROCEDURE — 6370000000 HC RX 637 (ALT 250 FOR IP)

## 2022-09-05 PROCEDURE — 2580000003 HC RX 258: Performed by: INTERNAL MEDICINE

## 2022-09-05 PROCEDURE — 6360000002 HC RX W HCPCS: Performed by: INTERNAL MEDICINE

## 2022-09-05 PROCEDURE — 2580000003 HC RX 258

## 2022-09-05 PROCEDURE — 85025 COMPLETE CBC W/AUTO DIFF WBC: CPT

## 2022-09-05 PROCEDURE — 1200000000 HC SEMI PRIVATE

## 2022-09-05 PROCEDURE — 36415 COLL VENOUS BLD VENIPUNCTURE: CPT

## 2022-09-05 PROCEDURE — 80069 RENAL FUNCTION PANEL: CPT

## 2022-09-05 PROCEDURE — 0DB78ZX EXCISION OF STOMACH, PYLORUS, VIA NATURAL OR ARTIFICIAL OPENING ENDOSCOPIC, DIAGNOSTIC: ICD-10-PCS | Performed by: INTERNAL MEDICINE

## 2022-09-05 PROCEDURE — 2709999900 HC NON-CHARGEABLE SUPPLY: Performed by: INTERNAL MEDICINE

## 2022-09-05 PROCEDURE — 88305 TISSUE EXAM BY PATHOLOGIST: CPT

## 2022-09-05 PROCEDURE — 7100000011 HC PHASE II RECOVERY - ADDTL 15 MIN: Performed by: INTERNAL MEDICINE

## 2022-09-05 PROCEDURE — 7100000010 HC PHASE II RECOVERY - FIRST 15 MIN: Performed by: INTERNAL MEDICINE

## 2022-09-05 RX ORDER — PANTOPRAZOLE SODIUM 40 MG/1
40 TABLET, DELAYED RELEASE ORAL
Status: DISCONTINUED | OUTPATIENT
Start: 2022-09-06 | End: 2022-09-07 | Stop reason: HOSPADM

## 2022-09-05 RX ORDER — MIDAZOLAM HYDROCHLORIDE 1 MG/ML
INJECTION INTRAMUSCULAR; INTRAVENOUS PRN
Status: DISCONTINUED | OUTPATIENT
Start: 2022-09-05 | End: 2022-09-05 | Stop reason: ALTCHOICE

## 2022-09-05 RX ORDER — FENTANYL CITRATE 50 UG/ML
INJECTION, SOLUTION INTRAMUSCULAR; INTRAVENOUS PRN
Status: DISCONTINUED | OUTPATIENT
Start: 2022-09-05 | End: 2022-09-05 | Stop reason: ALTCHOICE

## 2022-09-05 RX ADMIN — CYANOCOBALAMIN TAB 1000 MCG 1000 MCG: 1000 TAB at 13:32

## 2022-09-05 RX ADMIN — ATORVASTATIN CALCIUM 80 MG: 40 TABLET, FILM COATED ORAL at 21:15

## 2022-09-05 RX ADMIN — GABAPENTIN 600 MG: 600 TABLET, FILM COATED ORAL at 21:16

## 2022-09-05 RX ADMIN — HEPARIN SODIUM 5000 UNITS: 5000 INJECTION INTRAVENOUS; SUBCUTANEOUS at 13:38

## 2022-09-05 RX ADMIN — LINEZOLID 600 MG: 600 TABLET, FILM COATED ORAL at 13:32

## 2022-09-05 RX ADMIN — TRAZODONE HYDROCHLORIDE 100 MG: 100 TABLET ORAL at 21:16

## 2022-09-05 RX ADMIN — HYDROMORPHONE HYDROCHLORIDE 0.5 MG: 1 INJECTION, SOLUTION INTRAMUSCULAR; INTRAVENOUS; SUBCUTANEOUS at 04:47

## 2022-09-05 RX ADMIN — GABAPENTIN 600 MG: 600 TABLET, FILM COATED ORAL at 13:32

## 2022-09-05 RX ADMIN — CEFTRIAXONE 2000 MG: 2 INJECTION, POWDER, FOR SOLUTION INTRAMUSCULAR; INTRAVENOUS at 11:12

## 2022-09-05 RX ADMIN — FUROSEMIDE 40 MG: 40 TABLET ORAL at 13:32

## 2022-09-05 RX ADMIN — HEPARIN SODIUM 5000 UNITS: 5000 INJECTION INTRAVENOUS; SUBCUTANEOUS at 21:13

## 2022-09-05 RX ADMIN — TAMSULOSIN HYDROCHLORIDE 0.8 MG: 0.4 CAPSULE ORAL at 13:32

## 2022-09-05 RX ADMIN — SODIUM CHLORIDE, PRESERVATIVE FREE 10 ML: 5 INJECTION INTRAVENOUS at 21:12

## 2022-09-05 RX ADMIN — ACETAMINOPHEN 650 MG: 325 TABLET ORAL at 21:29

## 2022-09-05 RX ADMIN — Medication 5 MG: at 21:16

## 2022-09-05 RX ADMIN — LINEZOLID 600 MG: 600 TABLET, FILM COATED ORAL at 21:15

## 2022-09-05 ASSESSMENT — PAIN DESCRIPTION - LOCATION
LOCATION: FOOT
LOCATION: TOE (COMMENT WHICH ONE)

## 2022-09-05 ASSESSMENT — PAIN SCALES - WONG BAKER
WONGBAKER_NUMERICALRESPONSE: 0

## 2022-09-05 ASSESSMENT — PAIN SCALES - GENERAL
PAINLEVEL_OUTOF10: 0
PAINLEVEL_OUTOF10: 10
PAINLEVEL_OUTOF10: 0
PAINLEVEL_OUTOF10: 5
PAINLEVEL_OUTOF10: 0
PAINLEVEL_OUTOF10: 2

## 2022-09-05 ASSESSMENT — PAIN DESCRIPTION - ONSET: ONSET: ON-GOING

## 2022-09-05 ASSESSMENT — PAIN DESCRIPTION - FREQUENCY: FREQUENCY: CONTINUOUS

## 2022-09-05 ASSESSMENT — PAIN - FUNCTIONAL ASSESSMENT
PAIN_FUNCTIONAL_ASSESSMENT: ACTIVITIES ARE NOT PREVENTED
PAIN_FUNCTIONAL_ASSESSMENT: 0-10

## 2022-09-05 ASSESSMENT — PAIN DESCRIPTION - DESCRIPTORS
DESCRIPTORS: GNAWING;BURNING
DESCRIPTORS: BURNING

## 2022-09-05 ASSESSMENT — PAIN DESCRIPTION - ORIENTATION
ORIENTATION: RIGHT
ORIENTATION: RIGHT

## 2022-09-05 ASSESSMENT — PULMONARY FUNCTION TESTS
PIF_VALUE: 1
PIF_VALUE: 1
PIF_VALUE: 0

## 2022-09-05 ASSESSMENT — PAIN DESCRIPTION - PAIN TYPE: TYPE: CHRONIC PAIN

## 2022-09-05 NOTE — DISCHARGE SUMMARY
Discharge Summary    Date: 9/7/2022  Patient Name: José Antonio Ratliff    YOB: 1940     Age: 81 y.o. Admit Date: 8/30/2022  Discharge Date: 9/7/2022  Discharge Condition: Stable    Admission Diagnosis  Cellulitis [L03.90]; Wound infection [T14. 8XXA, L08.9]; Diabetic foot infection ([facility] [address] [I92.034, L08.9]      Discharge Diagnosis  Principal Problem:    Cellulitis  Resolved Problems:    * No resolved hospital problems. [facility] Stay  Narrative of Hospital Course:  30-year-old man with a past medical history of GERD, congestive heart failure, neurogenic bladder with chronic Iglesias, UTI, history of lower extremity venous wounds presented with right lower extremity pain and swelling. The patient was getting wound care at his nursing home [facility] when his nurse told him that his foot looks infected and he needs to go to the ED as his wound looked infected. The patient also reports that he had a temperature at his nursing home with a T-max being 101.2. In the ED the patient had normal vitals, grossly normal CBC and BMP. His CRP was elevated at 25 and his ESR was elevated at 56. X-ray of the right lower extremity showed soft tissue swelling but no cortical bone loss or gas. Podiatry was consulted and abx (Vanc/Zosyn) started. MRI obtained that showed OM of L 5th distal metatarsal and L 5th proximal phalanx. Patient taken to OR by podiatry for 5th ray resection. ID consulted, abx changed to Zyvox and Rocephin. Day after surgery, patient noted to have dark tarry stool that was heme occult positive with corresponding drop in Hgb. GI consulted who performed EGD which showed gastritis but no active bleeding. Patient was stable on discharge. Instructed to follow up with PCP, GI, podiatry, and Dr Fernanda Chávez regarding baclofen pump.     Consultants:  IP CONSULT TO PODIATRY  IP CONSULT TO HOSPITALIST  PHARMACY TO DOSE VANCOMYCIN  IP CONSULT TO INFECTIOUS DISEASES  IP CONSULT TO GI    Surgeries/procedures Performed:      Treatments:    Analgesia, Antibiotics, Surgery, IV Hydration and Anticoagulation    Acetaminophen and Dilaudid, Vancomycin, Zosyn, Ceftriaxone and Other, Heparin and ASA    Discharge Plan/Disposition:  To Non[facility]    Hospital/Incidental Findings Requiring Follow Up:    Patient Instructions:    Diet: Regular Diet    Activity:Activity as Tolerated  For number of days (if applicable): Other Instructions:    Provider Follow-Up:   No follow-ups on file. Significant Diagnostic Studies:    Recent Labs:  Admission on 08/30/2022  No results displayed because visit has over 200 results. ------------    Radiology last 7 days:  XR CHEST (2 VW)    Result Date: 9/1/2022  1. Unchanged pleural thickening or small left pleural effusion when compared to the prior exam. 2.  Pulmonary vascular congestion without localized consolidation. XR FOOT RIGHT (MIN 3 VIEWS)    Result Date: 9/3/2022  1. Fifth digit amputation to the proximal fifth metatarsal. No acute complication identified. 2. Osteopenia. MRI FOOT RIGHT WO CONTRAST    Result Date: 9/1/2022  1. Extremely limited study due to motion. 2. Findings suspicious for acute osteomyelitis involving the distal fifth metatarsal as well as the fifth proximal phalanx. 3. Diffuse subcutaneous edema which could reflect cellulitis or venous stasis. 4. Atrophy within the intrinsic musculature of the foot compatible with diabetic neuropathy. 5. Nonspecific marrow edema within the first proximal phalanx as well as the third middle phalanx. This could reflect infection versus reactive change.        Pending Labs     Order Current Status    Surgical Pathology Collected (09/02/22 4294)    Surgical Pathology Collected (09/02/22 6547)    Surgical Pathology Collected (09/05/22 1021)    Surgical Pathology In process    Surgical Pathology In process        Discharge Medications    Current Discharge Medication List    START taking these medications    linezolid (ZYVOX) 600 MG tablet  Take 1 tablet by mouth 2 times daily  Qty: 104 tablet Refills: 0          Current Discharge Medication List        Current Discharge Medication List    CONTINUE these medications which have NOT CHANGED    acetaminophen (TYLENOL) 325 MG tablet  Take 650 mg by mouth every 6 hours as needed for Pain    docusate sodium (COLACE) 100 MG capsule  Take 100 mg by mouth every morning (before breakfast)    ferrous sulfate (IRON 325) 325 (65 Fe) MG tablet  Take 325 mg by mouth in the morning and at bedtime    miconazole (MICOTIN) 2 % cream  Apply topically 2 times daily. Qty: 1 each Refills: 1    furosemide (LASIX) 40 MG tablet  Take 1 tablet by mouth daily  Qty: 60 tablet Refills: 3    Nutritional Supplements (RONEY PO)  Take 1 packet by mouth 2 times daily    Nutritional Supplements (ENSURE HIGH PROTEIN) LIQD  Take 1 Can by mouth 2 times daily    Wound Dressings (MEPILEX EX)  Apply topically Cleanse skin graft areas with normal saline, pat dry, apply mepilex foam board, change every 3 days as needed. traZODone (DESYREL) 100 MG tablet  Take 100 mg by mouth nightly    loperamide (IMODIUM) 2 MG capsule  Take 2 mg by mouth 4 times daily as needed for Diarrhea    Melatonin 5 MG CAPS  Take 1 capsule by mouth nightly    levothyroxine (SYNTHROID) 75 MCG tablet  Take 1 tablet by mouth Daily  Qty: 30 tablet Refills: 3    albuterol sulfate HFA (PROVENTIL HFA) 108 (90 Base) MCG/ACT inhaler  Inhale 2 puffs into the lungs every 6 hours as needed for Wheezing or Shortness of Breath  Qty: 18 g Refills: 3    guaiFENesin (MUCINEX) 600 MG extended release tablet  Take 1 tablet by mouth 2 times daily as needed for Congestion  Qty: 60 tablet Refills: 2    Balsam [place]-Castor Oil (VENELEX) OINT ointment  Apply topically daily as needed    gabapentin (NEURONTIN) 600 MG tablet  Take 600 mg by mouth 2 times daily.     dicyclomine (BENTYL) 20 MG tablet  Take 20 mg by mouth 3 times daily as needed    vitamin B-12 is provided before you take any medication    Ambulation and Activity:  You are advised to go directly home from the hospital  Use wheelchair as needed  You may not put weight on the operated foot. You should wear the surgical shoe at all times when awake. Avoid stairs if possible. Do not lift or move heavy objects  Do not drive until cleared by Dr. Ramesh Palomino DPM    Bandage and Wound Care Instructions:  Keep bandage clean and dry  Do not shower or bathe the operative extremity  Do not remove the bandage (unless otherwise directed)  Do not attempt to put anything between the cast or dressing and your skin, some itching is normal.    Ice and Elevation:  Elevate operative extremity as much as possible to reduce swelling and discomfort. Elevate with 2 pillows at or above the level of the heart for the first 72 hours. Ice:  [facility] dispensed insulated ice bag over the bandage 20 minutes of every hour while awake for the first 72 hours. You may not behind the knee as well. Special Instructions: Call your doctor immediately if you develop any of the following. Fever over 100 degrees by mouth - take your temperature daily until your first follow up visit. Pain not relieved by medication ordered  Swelling, increased redness, warmth, or hardness around operative area. Numb, tingling or cold toes. Toe(s) become white or bluish  Bandage becomes wet, soiled, or blood soaked (small amount of bleeding may be normal)  Increased or progressive drainage from surgical area. Follow up instructions: You will need to follow up with Dr. Rosalyn Schirmer Ash's office in the next 5 to 7 days. Call [phone] when you get home to make an appointment. Call Dr. Rosalyn Schirmer Ash's office if you have any questions or concerns.     Dr. Ramesh Palomino, [facility]  Office: [phone]  Fax: [phone]

## 2022-09-05 NOTE — PROGRESS NOTES
Progress Note    Admit Date: 8/30/2022  Diet: Diet NPO Exceptions are: Sips of Water with Meds    CC: leg pain    Interval history: NAEON. No new or worsening symptoms. Dx EGD today per GI. Medications:     Scheduled Meds:   cefTRIAXone (ROCEPHIN) IV  2,000 mg IntraVENous Q24H    pantoprazole  40 mg Oral BID AC    heparin (porcine)  5,000 Units SubCUTAneous 3 times per day    linezolid  600 mg Oral 2 times per day    traZODone  100 mg Oral Nightly    sodium chloride flush  5-40 mL IntraVENous 2 times per day    atorvastatin  80 mg Oral Nightly    furosemide  40 mg Oral Daily    gabapentin  600 mg Oral BID    levothyroxine  75 mcg Oral Daily    melatonin  5 mg Oral Nightly    tamsulosin  0.8 mg Oral Daily    vitamin B-12  1,000 mcg Oral Daily     Continuous Infusions:   sodium chloride 25 mL (09/04/22 0007)     PRN Meds:HYDROmorphone **OR** HYDROmorphone, miconazole, sodium chloride flush, sodium chloride, ondansetron **OR** ondansetron, polyethylene glycol, acetaminophen **OR** acetaminophen    Objective:   Vitals:   T-max:  No data found. Intake/Output Summary (Last 24 hours) at 9/5/2022 0800  Last data filed at 9/4/2022 2331  Gross per 24 hour   Intake 770 ml   Output 1450 ml   Net -680 ml       Review of Systems   Constitutional:  Negative for activity change and appetite change, fever or chills. Respiratory:  Negative for cough and shortness of breath. Cardiovascular:  Negative for chest pain or palpitations. Gastrointestinal:  Positive for abdominal distention. Negative for abdominal pain, N/V/D, hematochezia, and melena. Genitourinary:  Negative for dysuria. Skin:  Positive for erythema of RLE. Physical Exam  Constitutional:       Appearance: Normal appearance. Alert, and in no acute disstress. HENT:      Head: Normocephalic and atraumatic. Eyes:    EOMI  Cardiovascular: Positive mumur, negative for rubs or gallop. Pulmonary:      Effort: No respiratory distress. Breath sounds:  No subcutaneous edema which could reflect cellulitis or venous stasis. 4. Atrophy within the intrinsic musculature of the foot compatible with diabetic neuropathy. 5. Nonspecific marrow edema within the first proximal phalanx as well as the third middle phalanx. This could reflect infection versus reactive change. XR FOOT RIGHT (MIN 3 VIEWS)   Final Result   1. Suspected fracture of the second metatarsal shaft, incompletely assessed in this study. 2. Diffuse osteopenia in the tibia and fibula as well as the foot. Soft tissue swelling in the foot. 3. Incomplete assessment of the digits. XR TIBIA FIBULA RIGHT (2 VIEWS)   Final Result   1. Suspected fracture of the second metatarsal shaft, incompletely assessed in this study. 2. Diffuse osteopenia in the tibia and fibula as well as the foot. Soft tissue swelling in the foot. 3. Incomplete assessment of the digits. Assessment/Plan:   36-year-old man with a past medical history of GERD, congestive heart failure, neurogenic bladder with chronic Iglesias, UTI, A fib, history of lower extremity venous wounds presented with right lower extremity pain and swelling consistent with cellulitis. Cellulitis/Osteomyelitis of right lower extremity  RLE cellulitis, osteomyelitis, and is s/p amputation of RLE 5th digit. Wound culture positive for MRSA, Klebsiiella P., and Klebsiella A.  -Abx: Zyvox, Rocephin  -Podiatry following    Melena rule out  Hx GERD. Melena observed by RN, and is FOBT positive.     -Hgb trending downward  -EGD today per GI, f/u recs  -Protonix 40 mg PO BID     Chronic Medical Conditions    CAD: s/p CABG in 2013, Cont home ASA, and Lipitor. Neuropathy: Likely 2/2 spondylolisthesis, intrathecal baclofen pump interrogated during visit. Will need replacement w/ Dr. Fernanda Chávez () by 10/2022.   Hypothyroidism: synthyroid 75 mcg   BPH: Flowmax 0.8 mg PO daily, has chronic indwelling foleys catheter   Insomnia:Trazodone 25 mg PO qHS    Code Status: Full code  FEN: Diet NPO Exceptions are: Sips of Water with Meds   PPX: SQH  DISPO: Aaron Hernandes MD, PGY-1  09/05/22  8:00 AM    This patient has been staffed and discussed with Dr. Darryl Wong

## 2022-09-05 NOTE — PROGRESS NOTES
Pt aware of NPO status and EGD in am.    Electronically signed by Micaela Shaver RN on 9/4/22 at 8:54 PM EDT

## 2022-09-05 NOTE — OP NOTE
[facility] [place] [address]                                OPERATIVE REPORT    PATIENT NAME: Ermias Trinh                      :        1940  MED REC NO:   [id number]                          ROOM:       3310  ACCOUNT NO:   [de-identified]                           ADMIT DATE: 2022  PROVIDER:     Miek Cui MD    DATE OF PROCEDURE:  2022    PREOPERATIVE DIAGNOSIS:  Evaluation for upper GI bleeding. POSTOPERATIVE DIAGNOSES:  1. Gastritis. 2.  Rule out Helicobacter pylori. 3.  Hiatal hernia. PROCEDURES:  Esophagogastroduodenoscopy, biopsy. SURGEON:  Mike Cui MD    ANESTHESIA:  Demerol 25 mg, Versed 2.5 mg.    COMPLICATIONS:  None. DESCRIPTION OF PROCEDURE:  Informed consent was obtained after  explaining the risks of bleeding, infection, allergy, perforation,  medical and surgical management. Forward-viewing endoscope to oropharynx under direct visualization down  to the second portion of the duodenum, was normal.  Pylorus was normal.   Antrum, angularis, corpus, fundus, and cardia demonstrates gastropathy  with no active bleeding, deep ulcerations or severe erosions. Biopsy  obtained for Helicobacter. Small hiatal hernia detected. GE junction  normal.  Esophagus normal.  Recovery room in stable condition. IMPRESSION AND PLAN:  Resume diet. May resume aspirin in one week as  necessary. Reduced pantoprazole to 40 mg daily for both acute and  chronic therapy. We will call pathology. No active bleeding at this  point.         Toni Rose MD    D: 2022 10:30:05       T: 2022 12:47:26     [email or web address]  Job#: [id number]     Doc#: [id number]    CC:  Mike Cui MD

## 2022-09-05 NOTE — BRIEF OP NOTE
Brief Postoperative Note      Patient: Nate Velasquez  YOB: 1940  MRN: [id number]    Date of Procedure: 9/5/2022    Pre-Op Diagnosis: Acute GI bleeding [K92.2]    Post-Op Diagnosis: Same       Procedure(s):  EGD BIOPSY    Surgeon(s):  Juan R Finn MD    Assistant:  * No surgical staff found *    Anesthesia: Monitor Anesthesia Care    Estimated Blood Loss (mL): Minimal    Complications: None    Specimens:   ID Type Source Tests Collected by Time Destination   A : bx gastric for gastritis & h.pylori Gastric Gastric SURGICAL PATHOLOGY Juan R Finn MD 9/5/2022 1021        Implants:  * No implants in log *      Drains:   Negative Pressure Wound Therapy Leg Anterior; Lower;Right (Active)       Urinary Catheter 08/31/22 (Active)   Catheter Indications Urinary retention (acute or chronic), continuous bladder irrigation or bladder outlet obstruction 09/04/22 0615   Site Assessment No urethral drainage 09/04/22 0615   Urine Color Yellow 09/04/22 2331   Urine Appearance Hazy 09/04/22 2331   Collection Container Standard 09/04/22 2046   Securement Method Securing device (Describe) 09/04/22 2046   Catheter Care Completed Yes 09/04/22 0615   Catheter Best Practices  Drainage tube clipped to bed;Catheter secured to thigh; Bag below bladder;Bag not on floor; Lack of dependent loop in tubing;Drainage bag less than half full 09/04/22 0615   Status Draining 09/04/22 0615   Output (mL) 275 mL 09/05/22 0845       Findings: 1. Gastritis path pending. No bleeding. Resume diet and reduce Protonix to 40mg QD.      Electronically signed by Joon Mckeon MD on 9/5/2022 at 10:25 AM

## 2022-09-05 NOTE — CONSULTS
[place] [address]                                  CONSULTATION    PATIENT NAME: Vernon Fernandez                      :        1940  MED REC NO:   [id number]                          ROOM:       3310  ACCOUNT NO:   [de-identified]                           ADMIT DATE: 2022  PROVIDER:     Stas Martin MD    CONSULT DATE:  2022    ROOM NUMBER:  3310. REFERRING PROVIDER:  Get Harrison MD    HISTORY OF PRESENT ILLNESS:  The patient is an 71-year-old white male  last seen by Dr. Skinner in . At that time, an EGD revealed  gastritis. GI consultation was requested for melenic stool that was  noted last night. The hemoglobin, however, has been stable. The  patient's vital signs have likewise been stable. The patient complains  of no GI symptoms whatsoever. He is tolerating a regular diet without  issues. He does take an 81 mg aspirin as an outpatient. He does not  use NSAIDs, tobacco or ethanol. He also takes a 40 mg dose of  pantoprazole daily as an outpatient. The patient was admitted with right lower extremity pain and swelling  and was found to have cellulitis with full-thickness ulcerations. The  patient also has had a partial left fifth toe resection and has  peripheral neuropathy. He did undergo surgery. PAST MEDICAL HISTORY:  This is quite extensive and is remarkable for  hypertension, hypercholesterolemia, atrial fibrillation, congestive  heart failure, carotid stenosis, BPH, C. difficile colitis,  diverticulosis, GERD, renal insufficiency, vitamin B12 deficiency, and  spastic neuromuscular disorder. PAST SURGICAL HISTORY:  Past surgeries have included foot debridements,  right hip and leg surgery, cystoscopies, coronary artery bypass grafting  and lower back surgery. FAMILY HISTORY:  Not obtained. SOCIAL HISTORY:  Habits as described above.     MEDICATIONS: Please refer to the emergency room physician note for the  patient's current extensive outpatient drug treatment. ALLERGIES:  An allergy to BACTRIM was listed. PHYSICAL EXAMINATION:  GENERAL:  The patient appeared as an alert and cooperative elderly white  male in no acute distress. He was sitting up eating lunch and a more  extensive physical exam was not performed at this time. VITAL SIGNS:  Blood pressure 122/75, pulse 76, temperature 98.5 degrees  orally. IMPRESSION:  The recent onset of melenic stool with a stable hemoglobin  and stable vital signs. The patient is asymptomatic from a GI  standpoint otherwise. He is treated with an 81 mg aspirin daily. PLAN:  The patient will continue a regular diet and the hemoglobin will  be followed. His pantoprazole will be changed from IV to p.o. and the  dosage will be increased to 40 mg b.i.d. The patient's aspirin will be  held for now. There is no indication for an urgent EGD. IVONNE Eaton MD    D: 09/03/2022 14:33:41       T: 09/03/2022 14:37:52     [email or web address]  Job#: [id number]     Doc#: [id number]    CC:  MD Suha Fox MD Vanita Brazier.  Channing Herman MD

## 2022-09-05 NOTE — PROGRESS NOTES
Podiatric Surgery Daily Progress Note      Admit Date: 8/30/2022      Code:Full Code    Patient seen and examined, labs and records reviewed    Subjective:     Patient seen at bedside this AM.  Patient denies any overnight acute event. Patient denies f/c/n/v/sob/cp. Review of Systems: A 12 point review of symptoms is unremarkable with the exception of the chief complaint. Patient specifically denies nausea, fever, vomiting, chills, shortness of breath, chest pain, abdominal pain, constipation or difficulty urinating. Objective     /70   Pulse 57   Temp 97.4 °F (36.3 °C) (Axillary)   Resp 16   Ht 6' (1.829 m)   Wt 216 lb 14.9 oz (98.4 kg)   SpO2 94%   BMI 29.42 kg/m²      I/O:  Intake/Output Summary (Last 24 hours) at 9/5/2022 1158  Last data filed at 9/5/2022 0845  Gross per 24 hour   Intake 530 ml   Output 1725 ml   Net -1195 ml              Wt Readings from Last 3 Encounters:   09/04/22 216 lb 14.9 oz (98.4 kg)   08/31/22 225 lb (102.1 kg)   07/07/22 218 lb 7.6 oz (99.1 kg)       LABS:    Recent Labs     09/04/22  0547 09/05/22  0547   WBC 8.5 7.7   HGB 9.8* 9.6*   HCT 28.8* 30.0*    187        Recent Labs     09/05/22  0547      K 5.0      CO2 28   PHOS 4.3   BUN 44*   CREATININE 1.2        No results for input(s): PROT, INR, APTT in the last 72 hours. LEFT LOWER EXTREMITY EXAMINATION  Dressing to right lower extremity left clean, dry, and intact. No strikethrough noted to external dressing. Dressing to left LE intact. No strikethrough noted to the external dressing. No drainage noted to the internal layers of the dressing. VASCULAR: DP and PT pulses are nonpalpable 0/4. Upon hand-held Doppler examination were found to be biphasic. CFT is brisk to the remaining digits of the foot. Skin temperature is warm to cool from proximal to distal.  No edema noted. No pain with calf compression. NEUROLOGIC: Gross and epicritic sensation is intact.  Protective sensation is diminished at all pedal sites. DERMATOLOGIC:  Scattered abrasions noted to the anterior aspect of the left leg just distal to the tibial tuberosity. Incision site on the lateral aspect of the left foot is noted to be well coapted. No fluctuance, crepitus, or malodor noted. No acute signs of infection noted. MUSCULOSKELETAL: Muscle strength is 4/5 for all pedal groups tested. No pain with palpation of the foot or ankle. Ankle joint ROM is decreased in dorsiflexion with the knee extended. History of left fifth ray amputation. IMAGING:  XR right foot (9/2/2022)   Narrative   History: Status post incision and drainage. Right foot infection. 3 views right foot. FINDINGS: Previous amputation of the fifth digit to the proximal aspect of the fifth metatarsal. Soft tissue swelling. Severe demineralization. No acute fracture identified. Deformity of the first metatarsal suggesting remote injury. No acute    complication identified. Impression   1. Fifth digit amputation to the proximal fifth metatarsal. No acute complication identified. 2. Osteopenia. XR right foot (8/30/2022)  Narrative   4 views of the right tibia/fibula, 2 views of the right foot       HISTORY: Pain. FINDINGS:       In the right tibia/fibula. There is osteopenia. No definite periosteal bone formation or cortical destruction is seen. There are vascular calcifications. Within the foot, there is diffuse osteopenia. The digits are not well assessed on this examination. There appears to be a fracture in the second metatarsal shaft, incompletely assessed on this exam. Soft tissue swelling is present throughout the foot. Impression   1. Suspected fracture of the second metatarsal shaft, incompletely assessed in this study. 2. Diffuse osteopenia in the tibia and fibula as well as the foot. Soft tissue swelling in the foot. 3. Incomplete assessment of the digits.       MRI Right foot (8/31/2022)  Narrative   MRI right foot without contrast       HISTORY: Osteomyelitis. Ulceration. COMPARISON: August 30, 2022. FINDINGS:       The study is significantly limited due to patient motion. A skin ulcer is visible overlying the region of the fifth metatarsal head. There is marked marrow edema with corresponding T1 signal hypointensity in the distal aspect of the fifth metatarsal as well as within the fifth proximal phalanx. No definite    loculated fluid collection identified. There is nonspecific marrow edema within the first proximal phalanx as well as third middle phalanx. There is diffuse subcutaneous edema within the foot. There is atrophy within the intrinsic musculature of the foot as well. No definite infectious tenosynovitis. Impression   1. Extremely limited study due to motion. 2. Findings suspicious for acute osteomyelitis involving the distal fifth metatarsal as well as the fifth proximal phalanx. 3. Diffuse subcutaneous edema which could reflect cellulitis or venous stasis. 4. Atrophy within the intrinsic musculature of the foot compatible with diabetic neuropathy. 5. Nonspecific marrow edema within the first proximal phalanx as well as the third middle phalanx. This could reflect infection versus reactive change.          ASSESSMENT/PLAN  -s/p right foot incision and drainage with partial fifth ray amputation (DOS 9/2/2022)  -Cellulitis right lower extremity-resolved  -Hematoma, right lower extremity-improving  -Superficial abrasions, bilateral lower extremities  -Peripheral neuropathy, bilateral lower extremities  -s/p left foot partial 5th ray resection with graft application (DOS 51/00/7639)    -Patient seen and examined at bedside this AM  -VSS, No leukocytosis noted (WBC 7.7)  -ESR 56 and CRP 12.0  -HbA1c 5.4  -prealbumin 17.4; continue with oral nutritional supplement  -Blood cultures 1 & 2 NGTD  -Imaging reviewed, impression noted above  -Wound culture: MRSA, Klebsiella pneumonia, Klebsiella aerogenes  -Surgical path pending  -Continue antibiotics per ID recs  -ID following; recommend linezolid and ceftriaxone  -Left lower extremity dressed with Adaptic, gauze, and tape  -Right lower extremity dressing left clean, dry, and intact  -Prevalon boots reapplied. Patient is to wear at all times while in bed to prevent further deep tissue injury.  -Patient is nonweightbearing at baseline    DISPO: S/p right foot incision and drainage with partial fifth ray amputation. Procedure was felt to be definitive. Clearance fragment from the OR sent to pathology. Patient is okay to discharge from podiatric standpoint pending medical clearance and final ID recs.     Patient assessment and plan was discussed with Dr. Desiree Jones DPM.    Abi Polanco DPM   Podiatric Resident PGY2  Pager [phone] or [employer]  9/5/2022, 11:58 AM

## 2022-09-06 LAB
ALBUMIN SERPL-MCNC: 2.9 G/DL (ref 3.4–5)
ANION GAP SERPL CALCULATED.3IONS-SCNC: 10 MMOL/L (ref 3–16)
BASOPHILS ABSOLUTE: 0.1 K/UL (ref 0–0.2)
BASOPHILS RELATIVE PERCENT: 1 %
BUN BLDV-MCNC: 45 MG/DL (ref 7–20)
CALCIUM SERPL-MCNC: 8.2 MG/DL (ref 8.3–10.6)
CHLORIDE BLD-SCNC: 104 MMOL/L (ref 99–110)
CO2: 26 MMOL/L (ref 21–32)
CREAT SERPL-MCNC: 1 MG/DL (ref 0.8–1.3)
EOSINOPHILS ABSOLUTE: 1 K/UL (ref 0–0.6)
EOSINOPHILS RELATIVE PERCENT: 15.4 %
GFR AFRICAN AMERICAN: >60
GFR NON-AFRICAN AMERICAN: >60
GLUCOSE BLD-MCNC: 97 MG/DL (ref 70–99)
HCT VFR BLD CALC: 29.5 % (ref 40.5–52.5)
HEMOGLOBIN: 9.6 G/DL (ref 13.5–17.5)
LYMPHOCYTES ABSOLUTE: 1.5 K/UL (ref 1–5.1)
LYMPHOCYTES RELATIVE PERCENT: 23.1 %
MCH RBC QN AUTO: 30.8 PG (ref 26–34)
MCHC RBC AUTO-ENTMCNC: 32.4 G/DL (ref 31–36)
MCV RBC AUTO: 94.9 FL (ref 80–100)
MONOCYTES ABSOLUTE: 0.4 K/UL (ref 0–1.3)
MONOCYTES RELATIVE PERCENT: 5.8 %
NEUTROPHILS ABSOLUTE: 3.6 K/UL (ref 1.7–7.7)
NEUTROPHILS RELATIVE PERCENT: 54.7 %
PDW BLD-RTO: 15.3 % (ref 12.4–15.4)
PHOSPHORUS: 4 MG/DL (ref 2.5–4.9)
PLATELET # BLD: 181 K/UL (ref 135–450)
PMV BLD AUTO: 8.7 FL (ref 5–10.5)
POTASSIUM SERPL-SCNC: 4.8 MMOL/L (ref 3.5–5.1)
RBC # BLD: 3.11 M/UL (ref 4.2–5.9)
SODIUM BLD-SCNC: 140 MMOL/L (ref 136–145)
WBC # BLD: 6.6 K/UL (ref 4–11)

## 2022-09-06 PROCEDURE — 2580000003 HC RX 258: Performed by: INTERNAL MEDICINE

## 2022-09-06 PROCEDURE — 80069 RENAL FUNCTION PANEL: CPT

## 2022-09-06 PROCEDURE — 6370000000 HC RX 637 (ALT 250 FOR IP)

## 2022-09-06 PROCEDURE — 2580000003 HC RX 258

## 2022-09-06 PROCEDURE — 6360000002 HC RX W HCPCS

## 2022-09-06 PROCEDURE — 6360000002 HC RX W HCPCS: Performed by: INTERNAL MEDICINE

## 2022-09-06 PROCEDURE — 99232 SBSQ HOSP IP/OBS MODERATE 35: CPT | Performed by: INTERNAL MEDICINE

## 2022-09-06 PROCEDURE — 1200000000 HC SEMI PRIVATE

## 2022-09-06 PROCEDURE — 85025 COMPLETE CBC W/AUTO DIFF WBC: CPT

## 2022-09-06 PROCEDURE — 6370000000 HC RX 637 (ALT 250 FOR IP): Performed by: INTERNAL MEDICINE

## 2022-09-06 PROCEDURE — 36415 COLL VENOUS BLD VENIPUNCTURE: CPT

## 2022-09-06 RX ORDER — SODIUM CHLORIDE 0.9 % (FLUSH) 0.9 %
5-40 SYRINGE (ML) INJECTION EVERY 12 HOURS SCHEDULED
Status: DISCONTINUED | OUTPATIENT
Start: 2022-09-06 | End: 2022-09-07 | Stop reason: HOSPADM

## 2022-09-06 RX ORDER — SODIUM CHLORIDE 0.9 % (FLUSH) 0.9 %
5-40 SYRINGE (ML) INJECTION PRN
Status: DISCONTINUED | OUTPATIENT
Start: 2022-09-06 | End: 2022-09-07 | Stop reason: HOSPADM

## 2022-09-06 RX ORDER — SODIUM CHLORIDE 9 MG/ML
25 INJECTION, SOLUTION INTRAVENOUS PRN
Status: DISCONTINUED | OUTPATIENT
Start: 2022-09-06 | End: 2022-09-07 | Stop reason: HOSPADM

## 2022-09-06 RX ORDER — LINEZOLID 600 MG/1
600 TABLET, FILM COATED ORAL 2 TIMES DAILY
Qty: 104 TABLET | Refills: 0 | Status: SHIPPED | OUTPATIENT
Start: 2022-09-06 | End: 2022-09-28 | Stop reason: ALTCHOICE

## 2022-09-06 RX ORDER — LIDOCAINE HYDROCHLORIDE 10 MG/ML
5 INJECTION, SOLUTION EPIDURAL; INFILTRATION; INTRACAUDAL; PERINEURAL ONCE
Status: DISCONTINUED | OUTPATIENT
Start: 2022-09-06 | End: 2022-09-07 | Stop reason: HOSPADM

## 2022-09-06 RX ADMIN — GABAPENTIN 600 MG: 600 TABLET, FILM COATED ORAL at 22:52

## 2022-09-06 RX ADMIN — SODIUM CHLORIDE: 9 INJECTION, SOLUTION INTRAVENOUS at 10:10

## 2022-09-06 RX ADMIN — TAMSULOSIN HYDROCHLORIDE 0.8 MG: 0.4 CAPSULE ORAL at 09:59

## 2022-09-06 RX ADMIN — LINEZOLID 600 MG: 600 TABLET, FILM COATED ORAL at 22:52

## 2022-09-06 RX ADMIN — GABAPENTIN 600 MG: 600 TABLET, FILM COATED ORAL at 09:59

## 2022-09-06 RX ADMIN — Medication 5 MG: at 22:52

## 2022-09-06 RX ADMIN — HEPARIN SODIUM 5000 UNITS: 5000 INJECTION INTRAVENOUS; SUBCUTANEOUS at 14:36

## 2022-09-06 RX ADMIN — TRAZODONE HYDROCHLORIDE 100 MG: 100 TABLET ORAL at 22:53

## 2022-09-06 RX ADMIN — CYANOCOBALAMIN TAB 1000 MCG 1000 MCG: 1000 TAB at 09:59

## 2022-09-06 RX ADMIN — SODIUM CHLORIDE, PRESERVATIVE FREE 10 ML: 5 INJECTION INTRAVENOUS at 09:59

## 2022-09-06 RX ADMIN — PANTOPRAZOLE SODIUM 40 MG: 40 TABLET, DELAYED RELEASE ORAL at 06:55

## 2022-09-06 RX ADMIN — HEPARIN SODIUM 5000 UNITS: 5000 INJECTION INTRAVENOUS; SUBCUTANEOUS at 06:55

## 2022-09-06 RX ADMIN — FUROSEMIDE 40 MG: 40 TABLET ORAL at 09:59

## 2022-09-06 RX ADMIN — LEVOTHYROXINE SODIUM 75 MCG: 0.07 TABLET ORAL at 06:55

## 2022-09-06 RX ADMIN — ATORVASTATIN CALCIUM 80 MG: 40 TABLET, FILM COATED ORAL at 22:52

## 2022-09-06 RX ADMIN — HEPARIN SODIUM 5000 UNITS: 5000 INJECTION INTRAVENOUS; SUBCUTANEOUS at 22:52

## 2022-09-06 RX ADMIN — LINEZOLID 600 MG: 600 TABLET, FILM COATED ORAL at 09:59

## 2022-09-06 RX ADMIN — ACETAMINOPHEN 650 MG: 325 TABLET ORAL at 22:52

## 2022-09-06 RX ADMIN — CEFTRIAXONE 2000 MG: 2 INJECTION, POWDER, FOR SOLUTION INTRAMUSCULAR; INTRAVENOUS at 10:10

## 2022-09-06 ASSESSMENT — PAIN SCALES - GENERAL
PAINLEVEL_OUTOF10: 0
PAINLEVEL_OUTOF10: 0

## 2022-09-06 NOTE — PROGRESS NOTES
Patient is A&O x4.  RA, sat 94%. No complaints of SOB. Medicated for c/o pain as needed. Respirations appear to easy and unlabored. Lungs clear. Respirations easy with no complaints of cough. No complaints of nausea/vomiting/diarrhea. Up with assist/walker to the bathroom/BSC as needed. Left FA PIV intact and flushed. Right foot dressing intact. Tolerating regular diet. Plan of care and safety measures reviewed with the patient. Call light in reach and bed alarm in place. Will continue to monitor.   Electronically signed by Francis Márquez RN on 9/5/2022 at 11:06 PM

## 2022-09-06 NOTE — PROGRESS NOTES
Progress Note    Admit Date: 8/30/2022  Diet: ADULT DIET; Regular    CC: leg pain    Interval history: Received PRN pain meds overnight. No new or worsening symptoms. Medications:     Scheduled Meds:   pantoprazole  40 mg Oral QAM AC    cefTRIAXone (ROCEPHIN) IV  2,000 mg IntraVENous Q24H    heparin (porcine)  5,000 Units SubCUTAneous 3 times per day    linezolid  600 mg Oral 2 times per day    traZODone  100 mg Oral Nightly    sodium chloride flush  5-40 mL IntraVENous 2 times per day    atorvastatin  80 mg Oral Nightly    furosemide  40 mg Oral Daily    gabapentin  600 mg Oral BID    levothyroxine  75 mcg Oral Daily    melatonin  5 mg Oral Nightly    tamsulosin  0.8 mg Oral Daily    vitamin B-12  1,000 mcg Oral Daily     Continuous Infusions:   sodium chloride Stopped (09/04/22 0108)     PRN Meds:HYDROmorphone **OR** HYDROmorphone, miconazole, sodium chloride flush, sodium chloride, ondansetron **OR** ondansetron, polyethylene glycol, acetaminophen **OR** acetaminophen    Objective:   Vitals:   T-max:  Patient Vitals for the past 8 hrs:   BP Temp Temp src Pulse Resp SpO2 Weight   09/06/22 0419 (!) 104/54 97.9 °F (36.6 °C) Oral 64 10 93 % 217 lb 6 oz (98.6 kg)         Intake/Output Summary (Last 24 hours) at 9/6/2022 0858  Last data filed at 9/6/2022 0419  Gross per 24 hour   Intake 861.29 ml   Output 1900 ml   Net -1038.71 ml       Review of Systems   Constitutional:  Negative for activity change and appetite change, fever or chills. Respiratory:  Negative for cough and shortness of breath. Cardiovascular:  Negative for chest pain or palpitations. Gastrointestinal:  Positive for abdominal distention. Negative for abdominal pain, N/V/D, hematochezia, and melena. Genitourinary:  Negative for dysuria. Skin:  Positive for erythema of RLE. Physical Exam  Constitutional:       Appearance: Normal appearance. Alert, and in no acute disstress. HENT:      Head: Normocephalic and atraumatic.    Eyes: EOMI  Cardiovascular: Positive mumur, negative for rubs or gallop. Pulmonary:      Effort: No respiratory distress. Breath sounds: No wheezing, rales, or rhonchi. Abdominal:    Non-distended, non-tender, and NL bowel sounds present. Musculoskeletal:         erythema in RLE, see picture below. Skin:     General: Skin is warm. LABS:    CBC:   Recent Labs     09/04/22  0547 09/05/22  0547 09/06/22  0525   WBC 8.5 7.7 6.6   HGB 9.8* 9.6* 9.6*   HCT 28.8* 30.0* 29.5*    187 181   MCV 93.5 94.7 94.9       Renal:    Recent Labs     09/04/22  0547 09/05/22  0547 09/06/22  0525   * 141 140   K 4.8 5.0 4.8    104 104   CO2 27 28 26   BUN 38* 44* 45*   CREATININE 1.1 1.2 1.0   GLUCOSE 106* 94 97   CALCIUM 8.1* 8.2* 8.2*   MG 2.30  --   --    PHOS 3.8 4.3 4.0   ANIONGAP 7 9 10       Hepatic:   Recent Labs     09/04/22  0547 09/05/22  0547 09/06/22  0525   LABALBU 3.0* 3.0* 2.9*       Troponin: No results for input(s): TROPONINI in the last 72 hours. BNP: No results for input(s): BNP in the last 72 hours. Lipids: No results for input(s): CHOL, HDL in the last 72 hours. Invalid input(s): LDLCALCU, TRIGLYCERIDE  ABGs:  No results for input(s): PHART, QAM3LZG, PO2ART, LRI6QKB, BEART, THGBART, G7CWQRJD, NPL2DMY in the last 72 hours. INR: No results for input(s): INR in the last 72 hours. Lactate: No results for input(s): LACTATE in the last 72 hours. Cultures:  -----------------------------------------------------------------  RAD:   XR FOOT RIGHT (MIN 3 VIEWS)   Final Result   1. Fifth digit amputation to the proximal fifth metatarsal. No acute complication identified. 2. Osteopenia. XR CHEST (2 VW)   Final Result      1. Unchanged pleural thickening or small left pleural effusion when compared to the prior exam.      2.  Pulmonary vascular congestion without localized consolidation. MRI FOOT RIGHT WO CONTRAST   Final Result   1. Extremely limited study due to motion. 2. Findings suspicious for acute osteomyelitis involving the distal fifth metatarsal as well as the fifth proximal phalanx. 3. Diffuse subcutaneous edema which could reflect cellulitis or venous stasis. 4. Atrophy within the intrinsic musculature of the foot compatible with diabetic neuropathy. 5. Nonspecific marrow edema within the first proximal phalanx as well as the third middle phalanx. This could reflect infection versus reactive change. XR FOOT RIGHT (MIN 3 VIEWS)   Final Result   1. Suspected fracture of the second metatarsal shaft, incompletely assessed in this study. 2. Diffuse osteopenia in the tibia and fibula as well as the foot. Soft tissue swelling in the foot. 3. Incomplete assessment of the digits. XR TIBIA FIBULA RIGHT (2 VIEWS)   Final Result   1. Suspected fracture of the second metatarsal shaft, incompletely assessed in this study. 2. Diffuse osteopenia in the tibia and fibula as well as the foot. Soft tissue swelling in the foot. 3. Incomplete assessment of the digits. Assessment/Plan:   44-year-old man with a past medical history of GERD, congestive heart failure, neurogenic bladder with chronic Iglesias, UTI, A fib, history of lower extremity venous wounds presented with right lower extremity pain and swelling consistent with cellulitis. Cellulitis/Osteomyelitis of right lower extremity  RLE cellulitis, osteomyelitis, and is s/p amputation of RLE 5th digit. Wound culture positive for MRSA, Klebsiiella P., and Klebsiella A.  -Abx: Continue Zyvox (day 7), Rocephin (day 4) per ID -> await surg path results  -Podiatry following    Melena rule out  Hx GERD. Melena observed by RN, and is FOBT positive.   -Hgb stable ~9.6  -GI following, rec outpatient f/u, hold ASA 1 week, Protonix 40 mg PO daily     Chronic Medical Conditions  CAD: s/p CABG in 2013, Cont home ASA, and Lipitor.   Neuropathy: Likely 2/2 spondylolisthesis, intrathecal baclofen pump interrogated

## 2022-09-06 NOTE — PROGRESS NOTES
ID Follow-up NOTE    CC:   R LE cellulitis, LE wounds  Antibiotics: Linezolid, Ceftriaxone     Admit Date: 8/30/2022  Hospital Day: 8    Subjective:     S/p EGD 9/5  POD#4 R partial 5th ray amp    Patient reports R foot numb (different than usual neuropathy)  No complaint     Objective:     Patient Vitals for the past 8 hrs:   BP Temp Temp src Pulse Resp SpO2 Weight   09/06/22 0830 (!) 108/55 98.3 °F (36.8 °C) Oral 62 16 92 % --   09/06/22 0419 (!) 104/54 97.9 °F (36.6 °C) Oral 64 10 93 % 217 lb 6 oz (98.6 kg)       I/O last 3 completed shifts: In: 1151.3 [P.O.:1030; I.V.:25.3; IV Piggyback:96]  Out: 2800 [Urine:2800]  No intake/output data recorded. EXAM:  GENERAL: No apparent distress.     HEENT: Membranes moist, no oral lesion  NECK:  Supple, no lymphadenopathy  LUNGS: Clear b/l, no rales, no dullness  CARDIAC: RRR, no murmur appreciated  ABD:  + BS, soft / NT  EXT:  Bilateral LE dressing / ACE  NEURO: No focal neurologic findings  PSYCH: Orientation, sensorium, mood normal  LINES:  Peripheral iv       Data Review:  Lab Results   Component Value Date    WBC 6.6 09/06/2022    HGB 9.6 (L) 09/06/2022    HCT 29.5 (L) 09/06/2022    MCV 94.9 09/06/2022     09/06/2022     Lab Results   Component Value Date    CREATININE 1.0 09/06/2022    BUN 45 (H) 09/06/2022     09/06/2022    K 4.8 09/06/2022     09/06/2022    CO2 26 09/06/2022       Hepatic Function Panel:   Lab Results   Component Value Date/Time    ALKPHOS 45 04/22/2022 04:37 PM    ALT 25 04/22/2022 04:37 PM    AST 20 04/22/2022 04:37 PM    PROT 6.3 04/22/2022 04:37 PM    BILITOT <0.2 04/22/2022 04:37 PM    BILIDIR <0.2 06/20/2021 02:16 AM    IBILI see below 06/20/2021 02:16 AM    LABALBU 2.9 09/06/2022 05:25 AM       MICRO:  8/30 BC x 2 no growth to date    8/30 R foot wound - heavy S aureus / MRSA, light K pneumoniae, light K aerogenes  Staph aureus mrsa   Antibiotic Interpretation Microscan    ceFAZolin Resistant 8 mcg/mL   clindamycin Sensitive <=0.5 mcg/mL   erythromycin Resistant >4 mcg/mL   oxacillin Resistant >2 mcg/mL   tetracycline Sensitive <=4 mcg/mL   trimethoprim-sulfamethoxazole Sensitive <=0.5/9.5 mcg/mL   vancomycin Sensitive 1 mcg/mL     Klebsiella pneumoniae (2)  Antibiotic Interpretation Microscan    amoxicillin-clavulanate Resistant >16/8 mcg/mL   ampicillin Resistant >16 mcg/mL   ampicillin-sulbactam Intermediate 16/8 mcg/mL   ceFAZolin Resistant >16 mcg/mL   cefepime Sensitive <=2 mcg/mL   cefTRIAXone Sensitive <=1 mcg/mL   cefuroxime Sensitive <=4 mcg/mL   ciprofloxacin Sensitive <=1 mcg/mL   ertapenem Sensitive <=0.5 mcg/mL   gentamicin Sensitive <=4 mcg/mL   meropenem Sensitive <=1 mcg/mL   piperacillin-tazobactam Sensitive <=16 mcg/mL   trimethoprim-sulfamethoxazole Sensitive <=2/38 mcg/mL     Klebsiella aerogenes (3)  Antibiotic Interpretation Microscan    amoxicillin-clavulanate Resistant >16/8 mcg/mL   ampicillin Resistant >16 mcg/mL   ampicillin-sulbactam Resistant >16/8 mcg/mL   ceFAZolin Resistant >16 mcg/mL   cefepime Sensitive <=2 mcg/mL   cefTRIAXone Sensitive <=1 mcg/mL   cefuroxime Resistant 8 mcg/mL   ciprofloxacin Sensitive <=1 mcg/mL   ertapenem Sensitive <=0.5 mcg/mL   gentamicin Sensitive <=4 mcg/mL   meropenem Sensitive <=1 mcg/mL   piperacillin-tazobactam Sensitive <=16 mcg/mL   trimethoprim-sulfamethoxazole Sensitive <=2/38 mcg/mL     IMAGIN/30 R foot x-ray   1. Suspected fracture of the second metatarsal shaft, incompletely assessed in this study. 2. Diffuse osteopenia in the tibia and fibula as well as the foot. Soft tissue swelling in the foot. 3. Incomplete assessment of the digits.  R foot MRI  1. Extremely limited study due to motion. 2. Findings suspicious for acute osteomyelitis involving the distal fifth metatarsal as well as the fifth proximal phalanx. 3. Diffuse subcutaneous edema which could reflect cellulitis or venous stasis.    4. Atrophy within the intrinsic musculature of the foot compatible with diabetic neuropathy. 5. Nonspecific marrow edema within the first proximal phalanx as well as the third middle phalanx. This could reflect infection versus reactive change. Scheduled Meds:   pantoprazole  40 mg Oral QAM AC    cefTRIAXone (ROCEPHIN) IV  2,000 mg IntraVENous Q24H    heparin (porcine)  5,000 Units SubCUTAneous 3 times per day    linezolid  600 mg Oral 2 times per day    traZODone  100 mg Oral Nightly    sodium chloride flush  5-40 mL IntraVENous 2 times per day    atorvastatin  80 mg Oral Nightly    furosemide  40 mg Oral Daily    gabapentin  600 mg Oral BID    levothyroxine  75 mcg Oral Daily    melatonin  5 mg Oral Nightly    tamsulosin  0.8 mg Oral Daily    vitamin B-12  1,000 mcg Oral Daily       Continuous Infusions:   sodium chloride Stopped (09/04/22 0108)       PRN Meds:  HYDROmorphone **OR** HYDROmorphone, miconazole, sodium chloride flush, sodium chloride, ondansetron **OR** ondansetron, polyethylene glycol, acetaminophen **OR** acetaminophen      Assessment:     Hx AF, JARAD, CHF, neurogenic bladder with chronic schneider  Hx LE venous wounds  Hx mult admissions  Allergy Bactrim     Hx R 5th partial ray resection 5/29/22, Stravix graft placed     LE wounds  R LE cellulitis - resolved  R distal 5th MT and prox phalanx OM on MRI ('suspicous' / continuous with wound)   - 9/2/22 R 5th partial ray resection   - 'felt to be definitive'      Plan:     Cont Linezolid + Ceftriaxone  Await OR path / clearance fragment from 9/2    Postop care / follow-up per Podiatry    Pt prefers iv antibiotics. Concerned about residual infection and that 'pill are not enough'  Will place line and order iv   See AZRA    Medical Decision Making:   The following items were considered in medical decision making:  Discussion of patient care with other providers  Reviewed clinical lab tests  Reviewed radiology tests  Reviewed other diagnostic tests/interventions  Microbiology cultures and other micro tests reviewed      Discussed with pt, RN  Oliver Bullock MD    INFUSION ORDERS:  Ceftriaxone 2 gm iv daily through 10/28  (In addition to Linezolid 600 mg po bid for same duration)  - Diagnosis - R foot osteomyelitis  - First dose given in hospital  - PICC   - Disposition / date discharge  - Check CBC w diff, CMP, ESR, CRP every Mon or Tue - FAX result to [phone]  - Call with antibiotic / infusion issues, [phone]  - Call with any change in status, transfer in or out of a facility or to hospital - [phone]  - No f/u in outpatient ID office necessary

## 2022-09-06 NOTE — PROGRESS NOTES
Podiatric Surgery Daily Progress Note      Admit Date: 8/30/2022      Code:Full Code    Patient seen and examined, labs and records reviewed    Subjective:     Patient seen at bedside this AM.  Patient denies any overnight acute event. Patient denies f/c/n/v/sob/cp. Review of Systems: A 12 point review of symptoms is unremarkable with the exception of the chief complaint. Patient specifically denies nausea, fever, vomiting, chills, shortness of breath, chest pain, abdominal pain, constipation or difficulty urinating. Objective     BP (!) 104/54   Pulse 64   Temp 97.9 °F (36.6 °C) (Oral)   Resp 10   Ht 6' (1.829 m)   Wt 217 lb 6 oz (98.6 kg)   SpO2 93%   BMI 29.48 kg/m²      I/O:  Intake/Output Summary (Last 24 hours) at 9/6/2022 0836  Last data filed at 9/6/2022 0419  Gross per 24 hour   Intake 861.29 ml   Output 1900 ml   Net -1038.71 ml              Wt Readings from Last 3 Encounters:   09/06/22 217 lb 6 oz (98.6 kg)   08/31/22 225 lb (102.1 kg)   07/07/22 218 lb 7.6 oz (99.1 kg)       LABS:    Recent Labs     09/05/22  0547 09/06/22  0525   WBC 7.7 6.6   HGB 9.6* 9.6*   HCT 30.0* 29.5*    181        Recent Labs     09/06/22  0525      K 4.8      CO2 26   PHOS 4.0   BUN 45*   CREATININE 1.0        No results for input(s): PROT, INR, APTT in the last 72 hours. LEFT LOWER EXTREMITY EXAMINATION  Dressing to right lower extremity left clean, dry, and intact. No strikethrough noted to external dressing. Dressing to left LE intact. No strikethrough noted to the external dressing. No drainage noted to the internal layers of the dressing. VASCULAR: DP and PT pulses are nonpalpable 0/4. Upon hand-held Doppler examination were found to be biphasic. CFT is brisk to the remaining digits of the foot. Skin temperature is warm to cool from proximal to distal.  No edema noted. No pain with calf compression. NEUROLOGIC: Gross and epicritic sensation is intact.  Protective sensation is diminished at all pedal sites. DERMATOLOGIC:  Patient provided verbal consent for photos to be obtained today:    Scattered abrasions noted to the anterior aspect of the left leg just distal to the tibial tuberosity. Incision site on the lateral aspect of the left foot is noted to be well coapted. No fluctuance, crepitus, or malodor noted. No acute signs of infection noted. MUSCULOSKELETAL: Muscle strength is 4/5 for all pedal groups tested. No pain with palpation of the foot or ankle. Ankle joint ROM is decreased in dorsiflexion with the knee extended. History of left fifth ray amputation. IMAGING:  XR right foot (9/2/2022)   Narrative   History: Status post incision and drainage. Right foot infection. 3 views right foot. FINDINGS: Previous amputation of the fifth digit to the proximal aspect of the fifth metatarsal. Soft tissue swelling. Severe demineralization. No acute fracture identified. Deformity of the first metatarsal suggesting remote injury. No acute    complication identified. Impression   1. Fifth digit amputation to the proximal fifth metatarsal. No acute complication identified. 2. Osteopenia. XR right foot (8/30/2022)  Narrative   4 views of the right tibia/fibula, 2 views of the right foot       HISTORY: Pain. FINDINGS:       In the right tibia/fibula. There is osteopenia. No definite periosteal bone formation or cortical destruction is seen. There are vascular calcifications. Within the foot, there is diffuse osteopenia. The digits are not well assessed on this examination. There appears to be a fracture in the second metatarsal shaft, incompletely assessed on this exam. Soft tissue swelling is present throughout the foot. Impression   1. Suspected fracture of the second metatarsal shaft, incompletely assessed in this study. 2. Diffuse osteopenia in the tibia and fibula as well as the foot. Soft tissue swelling in the foot. 3. Incomplete assessment of the digits. MRI Right foot (8/31/2022)  Narrative   MRI right foot without contrast       HISTORY: Osteomyelitis. Ulceration. COMPARISON: August 30, 2022. FINDINGS:       The study is significantly limited due to patient motion. A skin ulcer is visible overlying the region of the fifth metatarsal head. There is marked marrow edema with corresponding T1 signal hypointensity in the distal aspect of the fifth metatarsal as well as within the fifth proximal phalanx. No definite    loculated fluid collection identified. There is nonspecific marrow edema within the first proximal phalanx as well as third middle phalanx. There is diffuse subcutaneous edema within the foot. There is atrophy within the intrinsic musculature of the foot as well. No definite infectious tenosynovitis. Impression   1. Extremely limited study due to motion. 2. Findings suspicious for acute osteomyelitis involving the distal fifth metatarsal as well as the fifth proximal phalanx. 3. Diffuse subcutaneous edema which could reflect cellulitis or venous stasis. 4. Atrophy within the intrinsic musculature of the foot compatible with diabetic neuropathy. 5. Nonspecific marrow edema within the first proximal phalanx as well as the third middle phalanx. This could reflect infection versus reactive change.          ASSESSMENT/PLAN  -s/p right foot incision and drainage with partial fifth ray amputation (DOS 9/2/2022)  -Cellulitis right lower extremity-resolved  -Hematoma, right lower extremity-improving  -Superficial abrasions, bilateral lower extremities  -Peripheral neuropathy, bilateral lower extremities  -s/p left foot partial 5th ray resection with graft application (DOS 05/48/3548)    -Patient seen and examined at bedside this AM  -Hypotensive otherwise VSS, No leukocytosis noted (WBC 6.6)  -ESR 56 and CRP 12.0  -HbA1c 5.4  -prealbumin 17.4; continue with oral nutritional supplement  -Blood cultures 1 & 2 NGTD  -Imaging reviewed, impression noted above  -Wound culture: MRSA, Klebsiella pneumonia, Klebsiella aerogenes  -Surgical path pending  -Continue antibiotics per ID recs  -ID following; recommend linezolid and ceftriaxone  -Left lower extremity dressed with Adaptic, gauze, and tape  -Right lower extremity dressing left clean, dry, and intact  -Prevalon boots reapplied. Patient is to wear at all times while in bed to prevent further deep tissue injury.  -Patient is nonweightbearing at baseline  -Will plan on changing right lower extremity dressing before patient is discharged from the hospital    DISPO: S/p right foot incision and drainage with partial fifth ray amputation. Procedure was felt to be definitive. Clearance fragment from the OR sent to pathology. Patient is okay to discharge from podiatric standpoint pending medical clearance and final ID recs.     Patient assessment and plan was discussed with Dr. Juan Carlos Dunn DPM.    Stacy Sibley DPM   Podiatric Resident PGY2  Pager [phone] or [employer]  9/6/2022, 8:36 AM

## 2022-09-06 NOTE — CARE COORDINATION
SW following Pt today and received discharged orders this afternoon. Dr. Nora Castillo placed IV ABX orders and order for PICC line. SW spoke with staff RN re: Pt still needs PICC as well as [employer] Precert for SNF. ELTON called Anna/Admissions at [facility] [phone] to discuss Pt's discharge but it went to voicemail so left message and asked for a return call. ELTON sent Pt's [employer] [address] but it did state that [facility] is not in-network so SW will have to discuss with Anna/Admissions.  SW will have to follow up in AM.    CARLIN Hudson  Case Management  [phone]

## 2022-09-07 VITALS
RESPIRATION RATE: 16 BRPM | HEIGHT: 72 IN | HEART RATE: 56 BPM | TEMPERATURE: 97.7 F | WEIGHT: 218.26 LBS | BODY MASS INDEX: 29.56 KG/M2 | SYSTOLIC BLOOD PRESSURE: 116 MMHG | OXYGEN SATURATION: 94 % | DIASTOLIC BLOOD PRESSURE: 68 MMHG

## 2022-09-07 LAB
ALBUMIN SERPL-MCNC: 3 G/DL (ref 3.4–5)
ANION GAP SERPL CALCULATED.3IONS-SCNC: 8 MMOL/L (ref 3–16)
BASOPHILS ABSOLUTE: 0.1 K/UL (ref 0–0.2)
BASOPHILS RELATIVE PERCENT: 1.4 %
BUN BLDV-MCNC: 44 MG/DL (ref 7–20)
CALCIUM SERPL-MCNC: 8.1 MG/DL (ref 8.3–10.6)
CHLORIDE BLD-SCNC: 105 MMOL/L (ref 99–110)
CO2: 26 MMOL/L (ref 21–32)
CREAT SERPL-MCNC: 0.9 MG/DL (ref 0.8–1.3)
EOSINOPHILS ABSOLUTE: 1.1 K/UL (ref 0–0.6)
EOSINOPHILS RELATIVE PERCENT: 16.3 %
GFR AFRICAN AMERICAN: >60
GFR NON-AFRICAN AMERICAN: >60
GLUCOSE BLD-MCNC: 89 MG/DL (ref 70–99)
HCT VFR BLD CALC: 28.9 % (ref 40.5–52.5)
HEMOGLOBIN: 9.7 G/DL (ref 13.5–17.5)
LYMPHOCYTES ABSOLUTE: 1.9 K/UL (ref 1–5.1)
LYMPHOCYTES RELATIVE PERCENT: 26.8 %
MCH RBC QN AUTO: 31.6 PG (ref 26–34)
MCHC RBC AUTO-ENTMCNC: 33.4 G/DL (ref 31–36)
MCV RBC AUTO: 94.5 FL (ref 80–100)
MONOCYTES ABSOLUTE: 0.5 K/UL (ref 0–1.3)
MONOCYTES RELATIVE PERCENT: 6.9 %
NEUTROPHILS ABSOLUTE: 3.4 K/UL (ref 1.7–7.7)
NEUTROPHILS RELATIVE PERCENT: 48.6 %
PDW BLD-RTO: 15.3 % (ref 12.4–15.4)
PHOSPHORUS: 3.9 MG/DL (ref 2.5–4.9)
PLATELET # BLD: 171 K/UL (ref 135–450)
PMV BLD AUTO: 8.2 FL (ref 5–10.5)
POTASSIUM SERPL-SCNC: 4.4 MMOL/L (ref 3.5–5.1)
RBC # BLD: 3.06 M/UL (ref 4.2–5.9)
SODIUM BLD-SCNC: 139 MMOL/L (ref 136–145)
WBC # BLD: 7.1 K/UL (ref 4–11)

## 2022-09-07 PROCEDURE — 6370000000 HC RX 637 (ALT 250 FOR IP): Performed by: INTERNAL MEDICINE

## 2022-09-07 PROCEDURE — 2580000003 HC RX 258

## 2022-09-07 PROCEDURE — C1751 CATH, INF, PER/CENT/MIDLINE: HCPCS

## 2022-09-07 PROCEDURE — 2580000003 HC RX 258: Performed by: INTERNAL MEDICINE

## 2022-09-07 PROCEDURE — 36569 INSJ PICC 5 YR+ W/O IMAGING: CPT

## 2022-09-07 PROCEDURE — 36415 COLL VENOUS BLD VENIPUNCTURE: CPT

## 2022-09-07 PROCEDURE — 6370000000 HC RX 637 (ALT 250 FOR IP)

## 2022-09-07 PROCEDURE — 99232 SBSQ HOSP IP/OBS MODERATE 35: CPT | Performed by: INTERNAL MEDICINE

## 2022-09-07 PROCEDURE — 6360000002 HC RX W HCPCS: Performed by: INTERNAL MEDICINE

## 2022-09-07 PROCEDURE — 02HV33Z INSERTION OF INFUSION DEVICE INTO SUPERIOR VENA CAVA, PERCUTANEOUS APPROACH: ICD-10-PCS | Performed by: INTERNAL MEDICINE

## 2022-09-07 PROCEDURE — 80069 RENAL FUNCTION PANEL: CPT

## 2022-09-07 PROCEDURE — 85025 COMPLETE CBC W/AUTO DIFF WBC: CPT

## 2022-09-07 PROCEDURE — 6360000002 HC RX W HCPCS

## 2022-09-07 RX ADMIN — GABAPENTIN 600 MG: 600 TABLET, FILM COATED ORAL at 10:01

## 2022-09-07 RX ADMIN — FUROSEMIDE 40 MG: 40 TABLET ORAL at 10:01

## 2022-09-07 RX ADMIN — TAMSULOSIN HYDROCHLORIDE 0.8 MG: 0.4 CAPSULE ORAL at 10:01

## 2022-09-07 RX ADMIN — SODIUM CHLORIDE: 9 INJECTION, SOLUTION INTRAVENOUS at 09:58

## 2022-09-07 RX ADMIN — LINEZOLID 600 MG: 600 TABLET, FILM COATED ORAL at 10:01

## 2022-09-07 RX ADMIN — SODIUM CHLORIDE, PRESERVATIVE FREE 10 ML: 5 INJECTION INTRAVENOUS at 10:00

## 2022-09-07 RX ADMIN — HEPARIN SODIUM 5000 UNITS: 5000 INJECTION INTRAVENOUS; SUBCUTANEOUS at 05:43

## 2022-09-07 RX ADMIN — CEFTRIAXONE 2000 MG: 2 INJECTION, POWDER, FOR SOLUTION INTRAMUSCULAR; INTRAVENOUS at 10:00

## 2022-09-07 RX ADMIN — LEVOTHYROXINE SODIUM 75 MCG: 0.07 TABLET ORAL at 05:44

## 2022-09-07 RX ADMIN — CYANOCOBALAMIN TAB 1000 MCG 1000 MCG: 1000 TAB at 10:01

## 2022-09-07 RX ADMIN — PANTOPRAZOLE SODIUM 40 MG: 40 TABLET, DELAYED RELEASE ORAL at 05:43

## 2022-09-07 RX ADMIN — SODIUM CHLORIDE, PRESERVATIVE FREE 10 ML: 5 INJECTION INTRAVENOUS at 10:01

## 2022-09-07 ASSESSMENT — PAIN SCALES - GENERAL: PAINLEVEL_OUTOF10: 0

## 2022-09-07 NOTE — PROGRESS NOTES
Pt resting with no complaints at this time. RR easy and unlabored. VSS. Chronic schneider remains in place with no issues. Fall precautions in place. Bed locked and in lowest position. Pt updated on care plan/status. Will continue to monitor.

## 2022-09-07 NOTE — DISCHARGE INSTR - DIET

## 2022-09-07 NOTE — PROGRESS NOTES
ID Follow-up NOTE    CC:   R LE cellulitis, LE wounds  Antibiotics: Linezolid, Ceftriaxone     Admit Date: 8/30/2022  Hospital Day: 9    Subjective:     POD#5 R partial 5th ray amp    Patient reports R foot better  No complaint     Objective:     Vitals:    09/07/22 0930   BP: 116/68   Pulse: 56   Resp: 16   Temp: 97.7 °F (36.5 °C)   SpO2: 94%     I/O last 3 completed shifts: In: 861.3 [P.O.:740; I.V.:25.3; IV Piggyback:96]  Out: 5289 [Urine:3425]  No intake/output data recorded. EXAM:  GENERAL: No apparent distress.     HEENT: Membranes moist, no oral lesion  NECK:  Supple, no lymphadenopathy  LUNGS: Clear b/l, no rales, no dullness  CARDIAC: RRR, no murmur appreciated  ABD:  + BS, soft / NT  EXT:  Bilateral LE dressing / ACE  NEURO: No focal neurologic findings  PSYCH: Orientation, sensorium, mood normal  LINES:  R PICC        Data Review:  Lab Results   Component Value Date    WBC 7.1 09/07/2022    HGB 9.7 (L) 09/07/2022    HCT 28.9 (L) 09/07/2022    MCV 94.5 09/07/2022     09/07/2022     Lab Results   Component Value Date    CREATININE 0.9 09/07/2022    BUN 44 (H) 09/07/2022     09/07/2022    K 4.4 09/07/2022     09/07/2022    CO2 26 09/07/2022       Hepatic Function Panel:   Lab Results   Component Value Date/Time    ALKPHOS 45 04/22/2022 04:37 PM    ALT 25 04/22/2022 04:37 PM    AST 20 04/22/2022 04:37 PM    PROT 6.3 04/22/2022 04:37 PM    BILITOT <0.2 04/22/2022 04:37 PM    BILIDIR <0.2 06/20/2021 02:16 AM    IBILI see below 06/20/2021 02:16 AM    LABALBU 3.0 09/07/2022 05:34 AM       MICRO:  8/30 BC x 2 no growth to date    8/30 R foot wound - heavy S aureus / MRSA, light K pneumoniae, light K aerogenes  Staph aureus mrsa   Antibiotic Interpretation Microscan    ceFAZolin Resistant 8 mcg/mL   clindamycin Sensitive <=0.5 mcg/mL   erythromycin Resistant >4 mcg/mL   oxacillin Resistant >2 mcg/mL   tetracycline Sensitive <=4 mcg/mL   trimethoprim-sulfamethoxazole Sensitive <=0.5/9.5 mcg/mL vancomycin Sensitive 1 mcg/mL     Klebsiella pneumoniae (2)  Antibiotic Interpretation Microscan    amoxicillin-clavulanate Resistant >16/8 mcg/mL   ampicillin Resistant >16 mcg/mL   ampicillin-sulbactam Intermediate 16/8 mcg/mL   ceFAZolin Resistant >16 mcg/mL   cefepime Sensitive <=2 mcg/mL   cefTRIAXone Sensitive <=1 mcg/mL   cefuroxime Sensitive <=4 mcg/mL   ciprofloxacin Sensitive <=1 mcg/mL   ertapenem Sensitive <=0.5 mcg/mL   gentamicin Sensitive <=4 mcg/mL   meropenem Sensitive <=1 mcg/mL   piperacillin-tazobactam Sensitive <=16 mcg/mL   trimethoprim-sulfamethoxazole Sensitive <=2/38 mcg/mL     Klebsiella aerogenes (3)  Antibiotic Interpretation Microscan    amoxicillin-clavulanate Resistant >16/8 mcg/mL   ampicillin Resistant >16 mcg/mL   ampicillin-sulbactam Resistant >16/8 mcg/mL   ceFAZolin Resistant >16 mcg/mL   cefepime Sensitive <=2 mcg/mL   cefTRIAXone Sensitive <=1 mcg/mL   cefuroxime Resistant 8 mcg/mL   ciprofloxacin Sensitive <=1 mcg/mL   ertapenem Sensitive <=0.5 mcg/mL   gentamicin Sensitive <=4 mcg/mL   meropenem Sensitive <=1 mcg/mL   piperacillin-tazobactam Sensitive <=16 mcg/mL   trimethoprim-sulfamethoxazole Sensitive <=2/38 mcg/mL     IMAGIN/30 R foot x-ray   1. Suspected fracture of the second metatarsal shaft, incompletely assessed in this study. 2. Diffuse osteopenia in the tibia and fibula as well as the foot. Soft tissue swelling in the foot. 3. Incomplete assessment of the digits.  R foot MRI  1. Extremely limited study due to motion. 2. Findings suspicious for acute osteomyelitis involving the distal fifth metatarsal as well as the fifth proximal phalanx. 3. Diffuse subcutaneous edema which could reflect cellulitis or venous stasis. 4. Atrophy within the intrinsic musculature of the foot compatible with diabetic neuropathy. 5. Nonspecific marrow edema within the first proximal phalanx as well as the third middle phalanx.  This could reflect infection versus reactive change. Scheduled Meds:   lidocaine 1 % injection  5 mL IntraDERmal Once    sodium chloride flush  5-40 mL IntraVENous 2 times per day    pantoprazole  40 mg Oral QAM AC    heparin (porcine)  5,000 Units SubCUTAneous 3 times per day    linezolid  600 mg Oral 2 times per day    traZODone  100 mg Oral Nightly    sodium chloride flush  5-40 mL IntraVENous 2 times per day    atorvastatin  80 mg Oral Nightly    furosemide  40 mg Oral Daily    gabapentin  600 mg Oral BID    levothyroxine  75 mcg Oral Daily    melatonin  5 mg Oral Nightly    tamsulosin  0.8 mg Oral Daily    vitamin B-12  1,000 mcg Oral Daily       Continuous Infusions:   sodium chloride      sodium chloride Stopped (09/07/22 1634)       PRN Meds:  sodium chloride flush, sodium chloride, HYDROmorphone **OR** HYDROmorphone, miconazole, sodium chloride flush, sodium chloride, ondansetron **OR** ondansetron, polyethylene glycol, acetaminophen **OR** acetaminophen      Assessment:     Hx AF, JARAD, CHF, neurogenic bladder with chronic schneider  Hx LE venous wounds  Hx mult admissions  Allergy Bactrim     Hx R 5th partial ray resection 5/29/22, Stravix graft placed     LE wounds  R LE cellulitis - resolved  R distal 5th MT and prox phalanx OM on MRI ('suspicous' / continuous with wound)   - 9/2/22 R 5th partial ray resection   - 'felt to be definitive', path - 'no acute osteomyelitis'      Plan:     Cont Linezolid + Ceftriaxone    Postop care / follow-up per Podiatry  See AZRA    Medical Decision Making:   The following items were considered in medical decision making:  Discussion of patient care with other providers  Reviewed clinical lab tests  Reviewed radiology tests  Reviewed other diagnostic tests/interventions  Microbiology cultures and other micro tests reviewed      Discussed with pt, RN  Bobby Wolf MD    INFUSION ORDERS:  Ceftriaxone 2 gm iv daily through 10/28  (In addition to Linezolid 600 mg po bid for same duration)  - Diagnosis - R foot osteomyelitis  - First dose given in hospital  - PICC   - Disposition / date discharge  - Check CBC w diff, CMP, ESR, CRP every Mon or Tue - FAX result to [phone]  - Call with antibiotic / infusion issues, [phone]  - Call with any change in status, transfer in or out of a facility or to hospital - [phone]  - No f/u in outpatient ID office necessary

## 2022-09-07 NOTE — CARE COORDINATION
ELTON called and spoke with Anna/Admissions ([phone]) for  SNF who stated she will be able to accept Pt once Vitaly Salinas is back but did not get a faxed referral on Pt last Friday. SW faxed to her Pt's facesheet to fax # [phone] so that she can review Pt's clinicals. She is aware that Pt is coming on IV ABX. Alfonzo Russ also stated that they are in-network with [facility]. SW will follow up with Pt's Vitaly Salinas. Alessia Mendez [facility]  Case Management  [phone]      1232-Addendum  Still no word on Pt's Cristine Boone Precert so ELTON called [facility] at [phone] [facility] so left [facility] info and ELTON phone # and requested a return call. Will follow and advise. Alessia Mendez [facility]  Case Management      9601-Addendum  ELTON received Pt's Juan Carlos Pitts Precert/Auth ID #[id number]. ELTON called Anna/Admissions at [facility] to inform - she can accept Pt for admit this afternoon. Staff RN to call report to [phone]. ELTON called [facility] ([facility]) and made a 6PM ambulance run. ELTON completed and submitted HENS # [id number]. Staff RN is aware of plan. Pt is aware and in agreement with DC plan. ELTON called Pt's dgt but went to  so left DC message.      Alessia Mendez, [facility]  Case Management  [phone]

## 2022-09-07 NOTE — PLAN OF CARE
Problem: Skin/Tissue Integrity  Goal: Absence of new skin breakdown  Description: 1. Monitor for areas of redness and/or skin breakdown  2. Assess vascular access sites hourly  3. Every 4-6 hours minimum:  Change oxygen saturation probe site  4. Every 4-6 hours:  If on nasal continuous positive airway pressure, respiratory therapy assess nares and determine need for appliance change or resting period.   Outcome: Completed     Problem: Chronic Conditions and Co-morbidities  Goal: Patient's chronic conditions and co-morbidity symptoms are monitored and maintained or improved  Outcome: Completed     Problem: Discharge Planning  Goal: Discharge to home or other facility with appropriate resources  Outcome: Completed     Problem: Pain  Goal: Verbalizes/displays adequate comfort level or baseline comfort level  Outcome: Completed     Problem: Safety - Adult  Goal: Free from fall injury  Outcome: Completed

## 2022-09-07 NOTE — PROGRESS NOTES
Podiatric Surgery Daily Progress Note      Admit Date: 8/30/2022      Code:Full Code    Patient seen and examined, labs and records reviewed    Subjective:     Patient seen at bedside this AM.  Patient denies any overnight acute event. Patient denies f/c/n/v/sob/cp. Review of Systems: A 12 point review of symptoms is unremarkable with the exception of the chief complaint. Patient specifically denies nausea, fever, vomiting, chills, shortness of breath, chest pain, abdominal pain, constipation or difficulty urinating. Objective     /62   Pulse (!) 45   Temp 97.7 °F (36.5 °C) (Oral)   Resp 18   Ht 6' (1.829 m)   Wt 217 lb 6 oz (98.6 kg)   SpO2 95%   BMI 29.48 kg/m²      I/O:  Intake/Output Summary (Last 24 hours) at 9/7/2022 0812  Last data filed at 9/6/2022 2007  Gross per 24 hour   Intake 240 ml   Output 2500 ml   Net -2260 ml              Wt Readings from Last 3 Encounters:   09/06/22 217 lb 6 oz (98.6 kg)   08/31/22 225 lb (102.1 kg)   07/07/22 218 lb 7.6 oz (99.1 kg)       LABS:    Recent Labs     09/06/22  0525 09/07/22  0534   WBC 6.6 7.1   HGB 9.6* 9.7*   HCT 29.5* 28.9*    171        Recent Labs     09/07/22  0534      K 4.4      CO2 26   PHOS 3.9   BUN 44*   CREATININE 0.9        No results for input(s): PROT, INR, APTT in the last 72 hours. LOWER EXTREMITY EXAMINATION  Dressing to bilateral LE intact. No strikethrough noted to the external dressing. No drainage noted to the internal layers of the dressing. VASCULAR: DP and PT pulses are nonpalpable 0/4 b/l. Upon hand-held Doppler examination were found to be biphasic. CFT is brisk to the remaining digits of the foot b/l. Skin temperature is warm to cool from proximal to distal.  No edema noted. No pain with calf compression b/l. NEUROLOGIC: Gross and epicritic sensation is intact b/l. Protective sensation is diminished at all pedal sites b/l.      DERMATOLOGIC:  Left Lower Extremity:    Scattered abrasions noted to the anterior aspect of the left leg just distal to the tibial tuberosity. Slight sanguinous drainage noted from the abrasions. Incision site on the lateral aspect of the left foot is noted to be well coapted. No fluctuance, crepitus, or malodor noted. No acute signs of infection noted. Right Lower Extremity:     Partial thickness ulceration noted to the lateral aspect of the right shin. Wound measures approximately 4 cm x 5 cm with a granular base. Wound does not tunnel, track, or probe to bone. No fluctuance, crepitus, malodor, or purulent drainage noted. No acute signs of infection noted. Partial-thickness ulceration noted to the lateral aspect of the ankle at the lateral malleolus. Wound measures approximately 3 cm x 2 cm with a granular base. Wound does not tunnel, track, or probe to bone. No fluctuance, crepitus, malodor, or purulent drainage noted. No acute signs of infection noted. Surgical incision noted to the lateral aspect of the right foot. Skin edges remain well approximated with sutures intact. No signs of dehiscence noted. No fluctuance, crepitus, malodor, or drainage noted. No acute signs of infection noted. MUSCULOSKELETAL: Muscle strength is 4/5 for all pedal groups tested. No pain with palpation of the foot or ankle. Ankle joint ROM is decreased in dorsiflexion with the knee extended. History of left fifth ray amputation and right partial 5th ray amputation. IMAGING:  XR right foot (9/2/2022)   Narrative   History: Status post incision and drainage. Right foot infection. 3 views right foot. FINDINGS: Previous amputation of the fifth digit to the proximal aspect of the fifth metatarsal. Soft tissue swelling. Severe demineralization. No acute fracture identified. Deformity of the first metatarsal suggesting remote injury. No acute    complication identified. Impression   1.  Fifth digit amputation to the proximal fifth metatarsal. No acute complication identified. 2. Osteopenia. XR right foot (8/30/2022)  Narrative   4 views of the right tibia/fibula, 2 views of the right foot       HISTORY: Pain. FINDINGS:       In the right tibia/fibula. There is osteopenia. No definite periosteal bone formation or cortical destruction is seen. There are vascular calcifications. Within the foot, there is diffuse osteopenia. The digits are not well assessed on this examination. There appears to be a fracture in the second metatarsal shaft, incompletely assessed on this exam. Soft tissue swelling is present throughout the foot. Impression   1. Suspected fracture of the second metatarsal shaft, incompletely assessed in this study. 2. Diffuse osteopenia in the tibia and fibula as well as the foot. Soft tissue swelling in the foot. 3. Incomplete assessment of the digits. MRI Right foot (8/31/2022)  Narrative   MRI right foot without contrast       HISTORY: Osteomyelitis. Ulceration. COMPARISON: August 30, 2022. FINDINGS:       The study is significantly limited due to patient motion. A skin ulcer is visible overlying the region of the fifth metatarsal head. There is marked marrow edema with corresponding T1 signal hypointensity in the distal aspect of the fifth metatarsal as well as within the fifth proximal phalanx. No definite    loculated fluid collection identified. There is nonspecific marrow edema within the first proximal phalanx as well as third middle phalanx. There is diffuse subcutaneous edema within the foot. There is atrophy within the intrinsic musculature of the foot as well. No definite infectious tenosynovitis. Impression   1. Extremely limited study due to motion. 2. Findings suspicious for acute osteomyelitis involving the distal fifth metatarsal as well as the fifth proximal phalanx.    3. Diffuse subcutaneous edema which could reflect cellulitis or venous

## 2022-09-07 NOTE — CONSULTS
PICC line education:    -Risks  -Benefits  -Alternatives  -Procedure    Discussed the above with patient, verbalized understanding, answered all questions. Provided with information on PICC care to review. PICC tip verified via [employer] (Ok to use). Reported off to patient's  Nurse #[phone] LEONID Duncan.

## 2022-09-08 ENCOUNTER — PHARMACY VISIT (OUTPATIENT)
Dept: INFECTIOUS DISEASES | Age: 82
End: 2022-09-08

## 2022-09-08 DIAGNOSIS — M86.171 OTHER ACUTE OSTEOMYELITIS OF RIGHT FOOT (HCC): Primary | ICD-10-CM

## 2022-09-08 NOTE — Clinical Note
We will have to keep a really close eye on his platelets while on long term linezolid. They are already <200.  Just BRETT

## 2022-09-08 NOTE — PROGRESS NOTES
Dr. Vonda Preston has placed a referral order for pharmacist to manage Outpatient Parental Antimicrobial Therapy (OPAT) pursuant the ID Collaborative Practice Agreement. Patient and/or caregiver has verbally consented to have drug therapy managed by a pharmacist. The benefits and risks of complex antimicrobial therapy including drug-specific adverse reactions and necessary follow-up were discussed with patient and/or caregiver and they are amenable to OPAT and pharmacist management. Pertinent Objective Data: Wt Readings from Last 1 Encounters:   09/07/22 218 lb 4.1 oz (99 kg)      BMI Readings from Last 1 Encounters:   09/07/22 29.60 kg/m²      Serum creatinine: 0.9 mg/dL 09/07/22 0534  Estimated creatinine clearance: 78 mL/min    Lab Results   Component Value Date     09/07/2022    K 4.4 09/07/2022     09/07/2022    CO2 26 09/07/2022    BUN 44 (H) 09/07/2022    CREATININE 0.9 09/07/2022    GLUCOSE 89 09/07/2022    CALCIUM 8.1 (L) 09/07/2022    PROT 6.3 (L) 04/22/2022    LABALBU 3.0 (L) 09/07/2022    BILITOT <0.2 04/22/2022    ALKPHOS 45 04/22/2022    AST 20 04/22/2022    ALT 25 04/22/2022    LABGLOM >60 09/07/2022    GFRAA >60 09/07/2022    AGRATIO 0.9 (L) 04/22/2022    GLOB 4.7 06/19/2021       Lab Results   Component Value Date    WBC 7.1 09/07/2022    HGB 9.7 (L) 09/07/2022    HCT 28.9 (L) 09/07/2022    MCV 94.5 09/07/2022     09/07/2022    LYMPHOPCT 26.8 09/07/2022    RBC 3.06 (L) 09/07/2022    MCH 31.6 09/07/2022    MCHC 33.4 09/07/2022    RDW 15.3 09/07/2022       Lab Results   Component Value Date    CRP 12.0 (H) 09/04/2022       Lab Results   Component Value Date    SEDRATE 56 (H) 08/30/2022         OPAT Orders:  Organism/Diagnosis  8/30 ? ? 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

## 2022-09-13 LAB
BASOPHILS ABSOLUTE: ABNORMAL
BASOPHILS RELATIVE PERCENT: ABNORMAL
EOSINOPHILS ABSOLUTE: ABNORMAL
EOSINOPHILS RELATIVE PERCENT: 16 %
HCT VFR BLD CALC: 29.1 % (ref 41–53)
HEMOGLOBIN: 9.8 G/DL (ref 13.5–17.5)
LYMPHOCYTES ABSOLUTE: ABNORMAL
LYMPHOCYTES RELATIVE PERCENT: ABNORMAL
MCH RBC QN AUTO: ABNORMAL PG
MCHC RBC AUTO-ENTMCNC: ABNORMAL G/DL
MCV RBC AUTO: ABNORMAL FL
MONOCYTES ABSOLUTE: ABNORMAL
MONOCYTES RELATIVE PERCENT: ABNORMAL
NEUTROPHILS ABSOLUTE: ABNORMAL
NEUTROPHILS RELATIVE PERCENT: 47.2 %
PDW BLD-RTO: ABNORMAL %
PLATELET # BLD: 127 K/ΜL
PMV BLD AUTO: ABNORMAL FL
RBC # BLD: ABNORMAL 10*6/UL
SEDIMENTATION RATE, ERYTHROCYTE: 19
WBC # BLD: 6.3 10^3/ML

## 2022-09-15 ENCOUNTER — TELEPHONE (OUTPATIENT)
Dept: INFECTIOUS DISEASES | Age: 82
End: 2022-09-15

## 2022-09-15 NOTE — TELEPHONE ENCOUNTER
Dr Toni Goodwin office ([phone] left message stating pt is needed a baclofen pump replaced in his back  Pt is needing to have this done by 10/20  Dr Meena Hutchins is wanting to know, from an ID standpoint when procedure can be scheduled to be done    Pt currently on   Ceftriaxone 2 gm iv daily through 10/28

## 2022-09-15 NOTE — TELEPHONE ENCOUNTER
Attempted to contact facility to request this weeks lab results be faxed to ouroffice  No Answer No voicemail oe phone tree option    [facility] [phone]

## 2022-09-16 NOTE — TELEPHONE ENCOUNTER
Left detail voicemail on identified office line for Indra Nurse   My name, title  Dr Jaja Oconnell name, specialty and affilitation  Pt name,   Stated ok from ID Standpoint to proceed with Bacoflin pump exchange  Left me direct line of [phone]

## 2022-09-19 NOTE — TELEPHONE ENCOUNTER
Spoke with nurse on Transitional care unit at [address] requested last weeks lab results be faxed to our office to my attention  [phone]

## 2022-09-20 LAB
ALBUMIN SERPL-MCNC: 3.4 G/DL
ALP BLD-CCNC: 39 U/L
ALT SERPL-CCNC: 16 U/L
ANION GAP SERPL CALCULATED.3IONS-SCNC: NORMAL MMOL/L
AST SERPL-CCNC: 13 U/L
BASOPHILS ABSOLUTE: ABNORMAL
BASOPHILS RELATIVE PERCENT: ABNORMAL
BILIRUB SERPL-MCNC: 0.3 MG/DL (ref 0.1–1.4)
BUN BLDV-MCNC: 43 MG/DL
C-REACTIVE PROTEIN: 3.4
CALCIUM SERPL-MCNC: NORMAL MG/DL
CHLORIDE BLD-SCNC: NORMAL MMOL/L
CO2: NORMAL
CREAT SERPL-MCNC: 1 MG/DL
EOSINOPHILS ABSOLUTE: ABNORMAL
EOSINOPHILS RELATIVE PERCENT: 13.1 %
GFR CALCULATED: NORMAL
GLUCOSE BLD-MCNC: 68 MG/DL
HCT VFR BLD CALC: 29.9 % (ref 41–53)
HEMOGLOBIN: 9.7 G/DL (ref 13.5–17.5)
LYMPHOCYTES ABSOLUTE: ABNORMAL
LYMPHOCYTES RELATIVE PERCENT: ABNORMAL
MCH RBC QN AUTO: ABNORMAL PG
MCHC RBC AUTO-ENTMCNC: ABNORMAL G/DL
MCV RBC AUTO: ABNORMAL FL
MONOCYTES ABSOLUTE: ABNORMAL
MONOCYTES RELATIVE PERCENT: ABNORMAL
NEUTROPHILS ABSOLUTE: ABNORMAL
NEUTROPHILS RELATIVE PERCENT: 44.4 %
PDW BLD-RTO: ABNORMAL %
PLATELET # BLD: 91 K/ΜL
PMV BLD AUTO: ABNORMAL FL
POTASSIUM SERPL-SCNC: 4.3 MMOL/L
RBC # BLD: ABNORMAL 10*6/UL
SEDIMENTATION RATE, ERYTHROCYTE: 16
SODIUM BLD-SCNC: 140 MMOL/L
TOTAL PROTEIN: NORMAL
WBC # BLD: 6.3 10^3/ML

## 2022-09-20 NOTE — TELEPHONE ENCOUNTER
Left message for DON stating   My name, title, dr Vera Hill name, specialty and affiliation  Pt name, ,   Labs needed weekly  Our fax number  My number   I have called requesting labs and still have not received results for last week and this week  Stated lab needed, that they are due weekly and we are monitoring his IV ATB tx  Repeated all information

## 2022-09-22 NOTE — TELEPHONE ENCOUNTER
Contacted Mary Mendoza and notified her I still have not received lab results for pt  She stated she will contact DON regarding this issue and speak with nurse who is caring for pt and ask why she has not called me or faxed the resutls

## 2022-09-22 NOTE — TELEPHONE ENCOUNTER
Spoke with Shaylee Torres the  for [facility]  Explained I have been calling for the past few weeks to obtain lab results for this pt  Explained we are managing his IV ATB and it is vital we have lab results weekly as ordered so we may monitor WBC, muscle enzymes, as well as liver and kidney function. Shaylee Torres took my name, our fax number and my number.   Shaylee Torres stated she would get this information to the nurse and have the results faxed to our office

## 2022-09-26 NOTE — TELEPHONE ENCOUNTER
Spoke with INES at  [phone], regarding needing lab results for the past 3 weeks.     DON stated nurse was to fax last week  I repeated to the DON the weekly labs needed  CBC with Diff, CMP, ESR, CRP    DON stated she works with Jaelyn Noyola a lot for Dr Crispin Walters  I gave my name and Dr Ruddy Oneal name and gave the fax number of [phone]    INES stated if she did not have any results she would order them stat

## 2022-09-27 ENCOUNTER — TELEPHONE (OUTPATIENT)
Dept: CARDIOLOGY CLINIC | Age: 82
End: 2022-09-27

## 2022-09-27 LAB
ALBUMIN SERPL-MCNC: 3.5 G/DL
ALP BLD-CCNC: 44 U/L
ALT SERPL-CCNC: 20 U/L
ANION GAP SERPL CALCULATED.3IONS-SCNC: NORMAL MMOL/L
AST SERPL-CCNC: 15 U/L
BASOPHILS ABSOLUTE: ABNORMAL
BASOPHILS RELATIVE PERCENT: ABNORMAL
BILIRUB SERPL-MCNC: 0.3 MG/DL (ref 0.1–1.4)
BUN BLDV-MCNC: 44 MG/DL
C-REACTIVE PROTEIN: 4.4
CALCIUM SERPL-MCNC: NORMAL MG/DL
CHLORIDE BLD-SCNC: NORMAL MMOL/L
CO2: NORMAL
CREAT SERPL-MCNC: 0.8 MG/DL
EOSINOPHILS ABSOLUTE: ABNORMAL
EOSINOPHILS RELATIVE PERCENT: 9.2 %
GFR CALCULATED: NORMAL
GLUCOSE BLD-MCNC: 81 MG/DL
HCT VFR BLD CALC: 30.1 % (ref 41–53)
HEMOGLOBIN: 10 G/DL (ref 13.5–17.5)
LYMPHOCYTES ABSOLUTE: ABNORMAL
LYMPHOCYTES RELATIVE PERCENT: ABNORMAL
MCH RBC QN AUTO: ABNORMAL PG
MCHC RBC AUTO-ENTMCNC: ABNORMAL G/DL
MCV RBC AUTO: ABNORMAL FL
MONOCYTES ABSOLUTE: ABNORMAL
MONOCYTES RELATIVE PERCENT: ABNORMAL
NEUTROPHILS ABSOLUTE: ABNORMAL
NEUTROPHILS RELATIVE PERCENT: 64.1 %
PDW BLD-RTO: ABNORMAL %
PLATELET # BLD: 97 K/ΜL
PMV BLD AUTO: ABNORMAL FL
POTASSIUM SERPL-SCNC: 4.6 MMOL/L
RBC # BLD: ABNORMAL 10*6/UL
SEDIMENTATION RATE, ERYTHROCYTE: 17
SODIUM BLD-SCNC: 142 MMOL/L
TOTAL PROTEIN: NORMAL
WBC # BLD: 7 10^3/ML

## 2022-09-27 NOTE — TELEPHONE ENCOUNTER
Reached out to WARSTUFF I am still awaiting lab results from 3 weeks ago   took my name and telephone number and stated she will have the nurse call me at [phone]

## 2022-09-27 NOTE — TELEPHONE ENCOUNTER
Lynette Block, 1940    Cardiac Risk Assessment    What type of procedure are you having?: Replacement of baclofen pump     When is your procedure scheduled for?:10/05/2022    What physician is performing your procedure?: Dr. Shakila Cherry    Fax number to send the letter:[phone]    Cardiologist: Dr.Whitney Eduardo/     Last Appointment:5/19/2022 Raffi Corona     Next Appointment:10/25/2022      Are you on any blood thinners?: NO     History of Coronary Artery Disease: YES     Last stress test:08/18/2020    Last echo:7/02/2022

## 2022-09-28 ENCOUNTER — TELEPHONE (OUTPATIENT)
Dept: INFECTIOUS DISEASES | Age: 82
End: 2022-09-28

## 2022-09-28 DIAGNOSIS — M86.171 OTHER ACUTE OSTEOMYELITIS OF RIGHT FOOT (HCC): ICD-10-CM

## 2022-09-28 NOTE — TELEPHONE ENCOUNTER
Weekly OPAT lab reivew:    Plts have dropped.     Please stop linezolid and ceftriaxone    Start ceftaroline 600 mg q12hr with same tentative stop date of 10/28    Bridger Mcgraw, PharmD, [address], [place] [facility]  Phone: [phone] (also available on [employer])  Fax: [phone]    For Pharmacy [address] [place] [address] in place:  Yes  Time Spent (min): 10

## 2022-09-28 NOTE — TELEPHONE ENCOUNTER
Attempted to speak with nurse caring for [facility]   Left message with new medication orders, my name, title, Dr Brandon Mean name, specialty, affiliation  My office number to call to verify orders were received    Please stop linezolid and ceftriaxone     Start ceftaroline 600 mg q12hr with same tentative stop date of 10/28

## 2022-09-28 NOTE — TELEPHONE ENCOUNTER
Spoke directly to INES and stated we still have not received lab results on patient    INES stated she faxed two days ago went we spoke but she received a \"busy\" response and did not get a chance to try later in the day.     INES stated she will call today once she has faxed the labs to verify we did receive them

## 2022-09-28 NOTE — TELEPHONE ENCOUNTER
Lab results for 9/13, 9/20, 9/27 received and abstracted; routed to [address] [place] [address]

## 2022-10-10 ENCOUNTER — TELEPHONE (OUTPATIENT)
Dept: INFECTIOUS DISEASES | Age: 82
End: 2022-10-10

## 2022-10-10 NOTE — TELEPHONE ENCOUNTER
Shaneka Drew called stating the labs were not drawn last week due to pt being in hospital to have Baclofen pump changed  The labs will be drawn tomorrow and the results faxed to this office

## 2022-10-11 ENCOUNTER — HOSPITAL ENCOUNTER (EMERGENCY)
Age: 82
Discharge: NURSING, REHAB OR LONG-TERM CARE | End: 2022-10-12
Attending: STUDENT IN AN ORGANIZED HEALTH CARE EDUCATION/TRAINING PROGRAM
Payer: MEDICARE

## 2022-10-11 ENCOUNTER — APPOINTMENT (OUTPATIENT)
Dept: GENERAL RADIOLOGY | Age: 82
End: 2022-10-11
Payer: MEDICARE

## 2022-10-11 DIAGNOSIS — D64.9 ANEMIA, UNSPECIFIED TYPE: Primary | ICD-10-CM

## 2022-10-11 LAB
ABO/RH: NORMAL
ANION GAP SERPL CALCULATED.3IONS-SCNC: 14 MMOL/L (ref 3–16)
ANTIBODY SCREEN: NORMAL
APTT: 36.4 SEC (ref 23–34.3)
BASOPHILS ABSOLUTE: 0.1 K/UL (ref 0–0.2)
BASOPHILS RELATIVE PERCENT: 0.6 %
BUN BLDV-MCNC: 32 MG/DL (ref 7–20)
CALCIUM SERPL-MCNC: 8 MG/DL (ref 8.3–10.6)
CHLORIDE BLD-SCNC: 101 MMOL/L (ref 99–110)
CO2: 22 MMOL/L (ref 21–32)
CREAT SERPL-MCNC: 1.1 MG/DL (ref 0.8–1.3)
EOSINOPHILS ABSOLUTE: 0.6 K/UL (ref 0–0.6)
EOSINOPHILS RELATIVE PERCENT: 7.8 %
GFR AFRICAN AMERICAN: >60
GFR NON-AFRICAN AMERICAN: >60
GLUCOSE BLD-MCNC: 132 MG/DL (ref 70–99)
HCT VFR BLD CALC: 26.5 % (ref 40.5–52.5)
HEMOGLOBIN: 8.6 G/DL (ref 13.5–17.5)
INR BLD: 1.19 (ref 0.87–1.14)
LYMPHOCYTES ABSOLUTE: 1 K/UL (ref 1–5.1)
LYMPHOCYTES RELATIVE PERCENT: 12.5 %
MCH RBC QN AUTO: 30.2 PG (ref 26–34)
MCHC RBC AUTO-ENTMCNC: 32.3 G/DL (ref 31–36)
MCV RBC AUTO: 93.3 FL (ref 80–100)
MONOCYTES ABSOLUTE: 0.5 K/UL (ref 0–1.3)
MONOCYTES RELATIVE PERCENT: 6.5 %
NEUTROPHILS ABSOLUTE: 5.9 K/UL (ref 1.7–7.7)
NEUTROPHILS RELATIVE PERCENT: 72.6 %
PDW BLD-RTO: 15.7 % (ref 12.4–15.4)
PLATELET # BLD: 210 K/UL (ref 135–450)
PMV BLD AUTO: 8.4 FL (ref 5–10.5)
POC OCCULT BLOOD STOOL: NEGATIVE
POTASSIUM REFLEX MAGNESIUM: 4.6 MMOL/L (ref 3.5–5.1)
PROTHROMBIN TIME: 15 SEC (ref 11.7–14.5)
RBC # BLD: 2.85 M/UL (ref 4.2–5.9)
SODIUM BLD-SCNC: 137 MMOL/L (ref 136–145)
WBC # BLD: 8.2 K/UL (ref 4–11)

## 2022-10-11 PROCEDURE — 85730 THROMBOPLASTIN TIME PARTIAL: CPT

## 2022-10-11 PROCEDURE — 86901 BLOOD TYPING SEROLOGIC RH(D): CPT

## 2022-10-11 PROCEDURE — 80048 BASIC METABOLIC PNL TOTAL CA: CPT

## 2022-10-11 PROCEDURE — 85610 PROTHROMBIN TIME: CPT

## 2022-10-11 PROCEDURE — 85025 COMPLETE CBC W/AUTO DIFF WBC: CPT

## 2022-10-11 PROCEDURE — 36415 COLL VENOUS BLD VENIPUNCTURE: CPT

## 2022-10-11 PROCEDURE — 99284 EMERGENCY DEPT VISIT MOD MDM: CPT

## 2022-10-11 PROCEDURE — 86900 BLOOD TYPING SEROLOGIC ABO: CPT

## 2022-10-11 PROCEDURE — 82272 OCCULT BLD FECES 1-3 TESTS: CPT

## 2022-10-11 PROCEDURE — 86850 RBC ANTIBODY SCREEN: CPT

## 2022-10-11 PROCEDURE — 71045 X-RAY EXAM CHEST 1 VIEW: CPT

## 2022-10-11 ASSESSMENT — ENCOUNTER SYMPTOMS
VOMITING: 1
SHORTNESS OF BREATH: 0
NAUSEA: 1
ABDOMINAL PAIN: 0

## 2022-10-11 ASSESSMENT — PAIN SCALES - GENERAL: PAINLEVEL_OUTOF10: 8

## 2022-10-11 ASSESSMENT — PAIN DESCRIPTION - LOCATION: LOCATION: OTHER (COMMENT)

## 2022-10-11 ASSESSMENT — PAIN - FUNCTIONAL ASSESSMENT: PAIN_FUNCTIONAL_ASSESSMENT: 0-10

## 2022-10-11 NOTE — ED PROVIDER NOTES
ED Attending Attestation Note     Date of evaluation: 10/11/2022    This patient was seen by the advance practice provider. I have seen and examined the patient, agree with the workup, evaluation, management and diagnosis. The care plan has been discussed. My assessment reveals with a history of MDRO, CAD, status post CABG times, lower GI bleed who presents to the ED for evaluation of hemoglobin level. Patient noted to have a hemoglobin of 7.1 on routine labs done at his facility. He denies any abdominal pain, chest pain, lightheadedness, trouble breathing at this time. Vital signs pertinent for some hypoxia with saturations around 91%. Patient denies wearing oxygen at baseline and states that his breathing is fine. Abdomen is nontender likely source GI given previous history. He is currently on iron supplementation. We will be repeating CBC at this time.      Mahesh Chaudhary MD  10/11/22 9131

## 2022-10-11 NOTE — ED TRIAGE NOTES
Pt c/o bed sores and pain in his feet. Was going to get baclofen pump implanted.  Hgb dropped 10.3 to 7.1

## 2022-10-11 NOTE — ED PROVIDER NOTES
[facility]          PHYSICIAN ASSISTANT NOTE       Date of evaluation: 10/11/2022    Chief Complaint     Abnormal Lab (Pt hgb went from 10.3 to 7.1. )      History of Present Illness     Tha Buitrago is a 81 y.o. male with PMH of CAD s/p CABG, CHF, HTN, neurogenic bladder with chronic Iglesias, UTI, history of lower extremity venous wounds, cervical myelopathy s/p intrathecal baclofen pump who presents from his nursing facility with abnormal lab. Patient's hgb dropped from 10.3 on 9/27/22 to 7.1 on outpatient labs performed today. On his admission 8/30 for right diabetic food wound, patient was found to have dark tarry stools which was heme positive with corresponding drop in hgb. GI was consulted, performed EGD which showed gastritis but no active bleeding. Patient's ASA was discontinued. He is not on anticoagulation. He remains on iron supplements. Patient denies any acute complaints today, other than discomfort from being jostled en route with EMS. He denies fatigue, dizziness, abdominal pain. Reports that he vomited his stomach contents a few days ago but reports this was not bloody or coffee ground in consistency. He is an unreliable historian. Review of Systems     Review of Systems   Constitutional:  Negative for chills, fatigue and fever. Respiratory:  Negative for shortness of breath. Cardiovascular:  Negative for chest pain. Gastrointestinal:  Positive for nausea and vomiting. Negative for abdominal pain. Skin:  Positive for wound. Neurological:  Negative for dizziness. Psychiatric/Behavioral:  The patient is not nervous/anxious. All other systems reviewed and are negative.     Past Medical, Surgical, Family, and Social History     He has a past medical history of BPH (benign prostatic hyperplasia), C. difficile colitis, Carotid stenosis, Cellulitis, Chronic back pain, Encounter for imaging to screen for metal prior to MRI, GERD (gastroesophageal reflux disease), History of atrial fibrillation, Hypertension, Lower GI bleed, MDRO (multiple drug resistant organisms) resistance, Neuromuscular disorder (HCC), Renal insufficiency, Septic arthritis of interphalangeal joint of toe of right foot ([address] Systolic congestive heart failure ([address] and Vitamin B12 deficiency. 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 His family history includes Heart Disease in his father. He reports that he quit smoking about 52 years ago. He has never used smokeless tobacco. He reports that he does not drink alcohol and does not use drugs. Medications     Current Discharge Medication List        CONTINUE these medications which have NOT CHANGED    Details   ceftaroline fosamil (TEFLARO) 600 MG SOLR Infuse 600 mg intravenously every 12 hours  Qty: 14 each, Refills: 0      acetaminophen (TYLENOL) 325 MG tablet Take 650 mg by mouth every 6 hours as needed for Pain      docusate sodium (COLACE) 100 MG capsule Take 100 mg by mouth every morning (before breakfast)      ferrous sulfate (IRON 325) 325 (65 Fe) MG tablet Take 325 mg by mouth in the morning and at bedtime      miconazole (MICOTIN) 2 % cream Apply topically 2 times daily.   Qty: 1 each, Refills: 1      furosemide (LASIX) 40 MG tablet Take 1 tablet by mouth daily  Qty: 60 tablet, Refills: 3      Nutritional Supplements (RONEY PO) Take 1 packet by mouth 2 times daily      Nutritional Supplements (ENSURE HIGH PROTEIN) LIQD Take 1 Can by mouth 2 times daily      Wound Dressings (MEPILEX EX) Apply topically Cleanse skin graft areas with normal saline, pat dry, apply mepilex foam board, change every 3 days as needed. traZODone (DESYREL) 100 MG tablet Take 100 mg by mouth nightly      loperamide (IMODIUM) 2 MG capsule Take 2 mg by mouth 4 times daily as needed for Diarrhea      Melatonin 5 MG CAPS Take 1 capsule by mouth nightly      levothyroxine (SYNTHROID) 75 MCG tablet Take 1 tablet by mouth Daily  Qty: 30 tablet, Refills: 3      albuterol sulfate HFA (PROVENTIL HFA) 108 (90 Base) MCG/ACT inhaler Inhale 2 puffs into the lungs every 6 hours as needed for Wheezing or Shortness of Breath  Qty: 18 g, Refills: 3      guaiFENesin (MUCINEX) 600 MG extended release tablet Take 1 tablet by mouth 2 times daily as needed for Congestion  Qty: 60 tablet, Refills: 2      Balsam [place]-Castor Oil (VENELEX) OINT ointment Apply topically daily as needed      gabapentin (NEURONTIN) 600 MG tablet Take 600 mg by mouth 2 times daily. dicyclomine (BENTYL) 20 MG tablet Take 20 mg by mouth 3 times daily as needed      vitamin B-12 (CYANOCOBALAMIN) 1000 MCG tablet Take 1,000 mcg by mouth daily      pantoprazole (PROTONIX) 40 MG tablet Take 1 tablet by mouth every morning (before breakfast)  Qty: 30 tablet, Refills: 3      tamsulosin (FLOMAX) 0.4 MG capsule Take 0.8 mg by mouth daily       atorvastatin (LIPITOR) 80 MG tablet Take 80 mg by mouth nightly. Allergies     He is allergic to bactrim [sulfamethoxazole-trimethoprim]. Physical Exam     INITIAL VITALS: BP: (!) 138/58,Temp: 97.8 °F (36.6 °C), Heart Rate: 77, Resp: 16, SpO2: 93 %   Physical Exam  Vitals and nursing note reviewed. Constitutional:       General: He is not in acute distress. Appearance: He is obese. HENT:      Head: Normocephalic and atraumatic. Mouth/Throat:      Mouth: Mucous membranes are moist.   Eyes:      Extraocular Movements: Extraocular movements intact.       Conjunctiva/sclera: Conjunctivae normal.   Cardiovascular:      Rate and Rhythm: Normal rate. Rhythm irregular. Pulmonary:      Effort: Pulmonary effort is normal. No respiratory distress. Breath sounds: Normal breath sounds. No wheezing, rhonchi or rales. Abdominal:      General: Bowel sounds are normal. There is no distension. Palpations: Abdomen is soft. Tenderness: There is no abdominal tenderness. There is no guarding or rebound. Genitourinary:     Comments: Brown stool on rectal exam. Sacral ulceration with minor bleeding  Musculoskeletal:         General: No deformity. Cervical back: Neck supple. Skin:     General: Skin is warm and dry. Neurological:      Mental Status: He is alert. Mental status is at baseline. Psychiatric:         Speech: Speech normal.         Behavior: Behavior is uncooperative and agitated. Diagnostic Results     RADIOLOGY:  XR CHEST PORTABLE   Final Result      1. Right PICC tip projects over the lower superior vena cava. 2. Patchy irregular left-sided airspace disease with a small left pleural effusion.           LABS:   Results for orders placed or performed during the hospital encounter of 10/11/22   CBC with Auto Differential   Result Value Ref Range    WBC 8.2 4.0 - 11.0 K/uL    RBC 2.85 (L) 4.20 - 5.90 M/uL    Hemoglobin 8.6 (L) 13.5 - 17.5 g/dL    Hematocrit 26.5 (L) 40.5 - 52.5 %    MCV 93.3 80.0 - 100.0 fL    MCH 30.2 26.0 - 34.0 pg    MCHC 32.3 31.0 - 36.0 g/dL    RDW 15.7 (H) 12.4 - 15.4 %    Platelets 866 637 - 424 K/uL    MPV 8.4 5.0 - 10.5 fL    Neutrophils % 72.6 %    Lymphocytes % 12.5 %    Monocytes % 6.5 %    Eosinophils % 7.8 %    Basophils % 0.6 %    Neutrophils Absolute 5.9 1.7 - 7.7 K/uL    Lymphocytes Absolute 1.0 1.0 - 5.1 K/uL    Monocytes Absolute 0.5 0.0 - 1.3 K/uL    Eosinophils Absolute 0.6 0.0 - 0.6 K/uL    Basophils Absolute 0.1 0.0 - 0.2 K/uL   BMP w/ Reflex to MG   Result Value Ref Range    Sodium 137 136 - 145 mmol/L    Potassium reflex Magnesium 4.6 3.5 - 5.1 mmol/L    Chloride 101 99 - 110 mmol/L    CO2 22 21 - 32 mmol/L    Anion Gap 14 3 - 16    Glucose 132 (H) 70 - 99 mg/dL    BUN 32 (H) 7 - 20 mg/dL    Creatinine 1.1 0.8 - 1.3 mg/dL    GFR Non-African American >60 >60    GFR African American >60 >60    Calcium 8.0 (L) 8.3 - 10.6 mg/dL   Protime-INR   Result Value Ref Range    Protime 15.0 (H) 11.7 - 14.5 sec    INR 1.19 (H) 0.87 - 1.14   APTT   Result Value Ref Range    aPTT 36.4 (H) 23.0 - 34.3 sec   POCT Blood Occult   Result Value Ref Range    POC Occult Blood Stool Negative Negative   TYPE AND SCREEN   Result Value Ref Range    ABO/Rh A POS     Antibody Screen NEG        RECENT VITALS:  BP: (!) 138/58, Temp: 97.8 °F (36.6 °C), Heart Rate: 77,Resp: 16, SpO2: 93 %     Procedures         ED Course     Nursing Notes, Past Medical Hx, Past Surgical Hx, Social Hx, Allergies, and Family Hx were reviewed. The patient was given the followingmedications:  No orders of the defined types were placed in this encounter. CONSULTS:  None    MEDICAL DECISION MAKING / ASSESSMENT / PLAN     Clau Moyer is a 81 y.o. male with PMH of CAD s/p CABG, CHF, HTN, neurogenic bladder with chronic Iglesias, UTI, history of lower extremity venous wounds, cervical myelopathy s/p intrathecal baclofen pump who presents from his nursing facility with abnormal lab. Patient's hgb dropped from 10.3 on 9/27/22 to 7.1 on outpatient labs performed today. Patient denies any acute complaints today, reports he feels \"fine. \"  Denies any fatigue, dizziness, current nausea, abdominal pain, chest pain or shortness of breath. Exam reveals an elderly 71-year-old male in no acute distress. Heart rate regular. Lungs clear to auscultation. Abdomen soft and nontender.  exam with brown stool. Sacral wound with minor bleeding. Patient's labs were repeated today. His hemoglobin is 8.6, HCT 26.5. INR 1.19. BMP with creatinine 1.1, glucose 132. POCT occult stool negative.   CXR revealed right PICC tip projecting over the lower superior vena cava. Patchy irregular left sided airspace disease with small left pleural effusion, similar in appearance to prior. Patient's 02 saturation 93-95% on RA here in the ED. Patient's chart was reviewed. He remains on IV ceftriaxone via PICC line and linezolid for osteomyelitis of his foot. He recently underwent replacement of his intrathecal baclofen pump on 10/5. Op note reveals minimal blood loss. He is also noted to have minor bleeding from sacral wound, which may be contributing to his mild hgb drop. He does not require a blood transfusion at this time and with no signs of active GI bleed, patient is stable for discharge back to his facility with outpatient follow up. At this point in time, patient is stable for discharge. Patient was given strict return precautions as outlined in the AVS. Patient was agreeable and understanding to this plan of care. This patient was also evaluated by the attending physician. All care plans were discussed and agreed upon. Clinical Impression     1.  Anemia, unspecified type        Disposition     PATIENT REFERRED TO:  Kobe Weber MD  [facility], [address] [id number]    Schedule an appointment as soon as possible for a visit       [facility] Emergency Department  [address] [place] [address]  [phone]    If symptoms worsen    DISCHARGE MEDICATIONS:  Current Discharge Medication List          DISPOSITION Discharge - Pending Orders Complete 10/11/2022 08:10:15 PM       Anurag Pacheco PA-C  10/12/22 0003       Anurag Pacheco PA-C  10/12/22 0003

## 2022-10-12 VITALS
SYSTOLIC BLOOD PRESSURE: 114 MMHG | TEMPERATURE: 97.8 F | HEART RATE: 77 BPM | DIASTOLIC BLOOD PRESSURE: 52 MMHG | OXYGEN SATURATION: 93 % | RESPIRATION RATE: 16 BRPM

## 2022-10-12 RX ORDER — DOXYCYCLINE HYCLATE 100 MG
100 TABLET ORAL 2 TIMES DAILY
Qty: 60 TABLET | Refills: 0 | OUTPATIENT
Start: 2022-10-12 | End: 2022-11-11

## 2022-10-12 RX ORDER — CIPROFLOXACIN 500 MG/1
500 TABLET, FILM COATED ORAL 2 TIMES DAILY
Qty: 60 TABLET | Refills: 0 | OUTPATIENT
Start: 2022-10-12 | End: 2022-11-11

## 2022-10-12 NOTE — TELEPHONE ENCOUNTER
Can stop IV ceftaroline and d/c PICC. Complete 4 weeks of PO doxy 100 mg BID to PO Cipro 500 mg BID. Orders changed.     Loni Guadarrama, PharmD, [address] [facility]  Phone: [phone] (also available on [employer])  Fax: [phone]    For Pharmacy [address] [place] [address] in place:  Yes  Time Spent (min): 10

## 2022-10-12 NOTE — DISCHARGE INSTRUCTIONS
Continue current medications as prescribed. Follow-up with gastroenterology. Return to the emergency department with new or worsening symptoms.

## 2022-10-12 NOTE — TELEPHONE ENCOUNTER
Radu Rand, [phone]  Nurse caring for pt asked for orders to be faxed to [phone]    Can stop IV ceftaroline and d/c PICC. Complete 4 weeks of PO doxy 100 mg BID to PO Cipro 500 mg BID.    Signed by Dr Rupinder Rueda    Confirmation scanned to Media

## 2022-10-13 NOTE — TELEPHONE ENCOUNTER
Left vm for DON asking to please return my call regarding orders for this pt faxed to facility yesterday  Left my direct number

## 2022-10-17 NOTE — TELEPHONE ENCOUNTER
Left  for DON asking to please return my call regarding orders for this pt faxed to facility on 10/12  Pt was to DC from [facility] this past weekend and transfer to a facility that does not accept IV ATB pts  I need to know were IV ATB stopped, PICC pulled and both PO atb started   Doxycycline 100 mg BID to PO Cipro 500 mg BID. I requested a confirmation call when fax was received by Sylvester and the call never came.   Left my direct number, name, Dr Franny Barrera name, specialty, affiliaction  Pt name and

## 2022-10-27 ENCOUNTER — HOSPITAL ENCOUNTER (EMERGENCY)
Age: 82
Discharge: HOME | End: 2022-10-27
Attending: EMERGENCY MEDICINE
Payer: MEDICARE

## 2022-10-27 ENCOUNTER — APPOINTMENT (OUTPATIENT)
Dept: CT IMAGING | Age: 82
End: 2022-10-27
Payer: MEDICARE

## 2022-10-27 VITALS
DIASTOLIC BLOOD PRESSURE: 52 MMHG | TEMPERATURE: 97.6 F | SYSTOLIC BLOOD PRESSURE: 137 MMHG | WEIGHT: 218 LBS | HEIGHT: 72 IN | RESPIRATION RATE: 12 BRPM | BODY MASS INDEX: 29.53 KG/M2 | HEART RATE: 59 BPM | OXYGEN SATURATION: 97 %

## 2022-10-27 DIAGNOSIS — D64.9 ANEMIA, UNSPECIFIED TYPE: Primary | ICD-10-CM

## 2022-10-27 LAB
A/G RATIO: 0.8 (ref 1.1–2.2)
ABO/RH: NORMAL
ALBUMIN SERPL-MCNC: 3.2 G/DL (ref 3.4–5)
ALP BLD-CCNC: 51 U/L (ref 40–129)
ALT SERPL-CCNC: 9 U/L (ref 10–40)
ANION GAP SERPL CALCULATED.3IONS-SCNC: 14 MMOL/L (ref 3–16)
ANTIBODY SCREEN: NORMAL
AST SERPL-CCNC: 10 U/L (ref 15–37)
BASOPHILS ABSOLUTE: 0 K/UL (ref 0–0.2)
BASOPHILS RELATIVE PERCENT: 0.6 %
BILIRUB SERPL-MCNC: 0.3 MG/DL (ref 0–1)
BLOOD BANK DISPENSE STATUS: NORMAL
BLOOD BANK PRODUCT CODE: NORMAL
BPU ID: NORMAL
BUN BLDV-MCNC: 20 MG/DL (ref 7–20)
CALCIUM SERPL-MCNC: 8.7 MG/DL (ref 8.3–10.6)
CHLORIDE BLD-SCNC: 103 MMOL/L (ref 99–110)
CO2: 24 MMOL/L (ref 21–32)
CREAT SERPL-MCNC: 1 MG/DL (ref 0.8–1.3)
DESCRIPTION BLOOD BANK: NORMAL
EOSINOPHILS ABSOLUTE: 0.2 K/UL (ref 0–0.6)
EOSINOPHILS RELATIVE PERCENT: 2.8 %
GFR SERPL CREATININE-BSD FRML MDRD: >60 ML/MIN/{1.73_M2}
GLUCOSE BLD-MCNC: 141 MG/DL (ref 70–99)
HCT VFR BLD CALC: 22 % (ref 40.5–52.5)
HCT VFR BLD CALC: 24.7 % (ref 40.5–52.5)
HEMOGLOBIN: 7.1 G/DL (ref 13.5–17.5)
HEMOGLOBIN: 8.1 G/DL (ref 13.5–17.5)
LYMPHOCYTES ABSOLUTE: 1 K/UL (ref 1–5.1)
LYMPHOCYTES RELATIVE PERCENT: 16.4 %
MCH RBC QN AUTO: 29.6 PG (ref 26–34)
MCHC RBC AUTO-ENTMCNC: 32.2 G/DL (ref 31–36)
MCV RBC AUTO: 92 FL (ref 80–100)
MONOCYTES ABSOLUTE: 0.5 K/UL (ref 0–1.3)
MONOCYTES RELATIVE PERCENT: 7.5 %
NEUTROPHILS ABSOLUTE: 4.6 K/UL (ref 1.7–7.7)
NEUTROPHILS RELATIVE PERCENT: 72.7 %
PDW BLD-RTO: 15.8 % (ref 12.4–15.4)
PLATELET # BLD: 218 K/UL (ref 135–450)
PMV BLD AUTO: 8.3 FL (ref 5–10.5)
POTASSIUM REFLEX MAGNESIUM: 4.6 MMOL/L (ref 3.5–5.1)
RBC # BLD: 2.4 M/UL (ref 4.2–5.9)
SODIUM BLD-SCNC: 141 MMOL/L (ref 136–145)
TOTAL PROTEIN: 7.1 G/DL (ref 6.4–8.2)
WBC # BLD: 6.3 K/UL (ref 4–11)

## 2022-10-27 PROCEDURE — 85025 COMPLETE CBC W/AUTO DIFF WBC: CPT

## 2022-10-27 PROCEDURE — 86850 RBC ANTIBODY SCREEN: CPT

## 2022-10-27 PROCEDURE — 80053 COMPREHEN METABOLIC PANEL: CPT

## 2022-10-27 PROCEDURE — 36430 TRANSFUSION BLD/BLD COMPNT: CPT

## 2022-10-27 PROCEDURE — 86901 BLOOD TYPING SEROLOGIC RH(D): CPT

## 2022-10-27 PROCEDURE — 36415 COLL VENOUS BLD VENIPUNCTURE: CPT

## 2022-10-27 PROCEDURE — 85014 HEMATOCRIT: CPT

## 2022-10-27 PROCEDURE — 6360000004 HC RX CONTRAST MEDICATION: Performed by: STUDENT IN AN ORGANIZED HEALTH CARE EDUCATION/TRAINING PROGRAM

## 2022-10-27 PROCEDURE — 74177 CT ABD & PELVIS W/CONTRAST: CPT

## 2022-10-27 PROCEDURE — 85018 HEMOGLOBIN: CPT

## 2022-10-27 PROCEDURE — 86923 COMPATIBILITY TEST ELECTRIC: CPT

## 2022-10-27 PROCEDURE — 86900 BLOOD TYPING SEROLOGIC ABO: CPT

## 2022-10-27 PROCEDURE — P9016 RBC LEUKOCYTES REDUCED: HCPCS

## 2022-10-27 PROCEDURE — 99285 EMERGENCY DEPT VISIT HI MDM: CPT

## 2022-10-27 RX ORDER — SODIUM CHLORIDE 9 MG/ML
INJECTION, SOLUTION INTRAVENOUS PRN
Status: DISCONTINUED | OUTPATIENT
Start: 2022-10-27 | End: 2022-10-28 | Stop reason: HOSPADM

## 2022-10-27 RX ADMIN — IOPAMIDOL 75 ML: 755 INJECTION, SOLUTION INTRAVENOUS at 17:07

## 2022-10-27 ASSESSMENT — PAIN DESCRIPTION - FREQUENCY: FREQUENCY: CONTINUOUS

## 2022-10-27 ASSESSMENT — PAIN DESCRIPTION - PAIN TYPE: TYPE: CHRONIC PAIN

## 2022-10-27 ASSESSMENT — ENCOUNTER SYMPTOMS
CHEST TIGHTNESS: 0
DIARRHEA: 0
SHORTNESS OF BREATH: 0
ABDOMINAL DISTENTION: 0
ABDOMINAL PAIN: 0
BACK PAIN: 0
VOMITING: 0
NAUSEA: 0
SORE THROAT: 0
CONSTIPATION: 0
COUGH: 0

## 2022-10-27 ASSESSMENT — PAIN SCALES - GENERAL: PAINLEVEL_OUTOF10: 7

## 2022-10-27 ASSESSMENT — PAIN DESCRIPTION - LOCATION: LOCATION: LEG

## 2022-10-27 ASSESSMENT — PAIN DESCRIPTION - ORIENTATION: ORIENTATION: RIGHT;LEFT

## 2022-10-27 ASSESSMENT — PAIN - FUNCTIONAL ASSESSMENT: PAIN_FUNCTIONAL_ASSESSMENT: 0-10

## 2022-10-27 NOTE — ED TRIAGE NOTES
Sent for low hgb level. Pt was here recently for Baclofen pump placement. Denies vomiting or diarrhea.  Denies blood in stool/urine

## 2022-10-27 NOTE — ED PROVIDER NOTES
ED Attending Attestation Note     Date of evaluation: 10/27/2022    This patient was seen by the resident. I have seen and examined the patient, agree with the workup, evaluation, management and diagnosis. The care plan has been discussed. My assessment reveals chronically ill-appearing 81-year-old male with increasing fatigue and anemia. He denies any abdominal pain, hematemesis or melena. On examination he has a soft abdomen without rebound or guarding. He is slightly pale, but mentating well. Kishore Staples MD  10/27/22 8835

## 2022-10-27 NOTE — ED PROVIDER NOTES
[address] [facility] RESIDENT NOTE     Date of evaluation: 10/27/2022    ADDENDUM:      Care of this patient was assumed from Dr. Dina Todd. The patient was seen for Other (Abnormal lab: hgb 6.5)  . The patient's initial evaluation and plan have been discussed with the prior provider who initially evaluated the patient. Nursing Notes, Past Medical Hx, Past Surgical Hx, Social Hx, Allergies, and Family Hx were all reviewed. - f/u repeat hgb  - reassess/dispo        Diagnostic Results       RADIOLOGY:  CT ABDOMEN PELVIS W IV CONTRAST Additional Contrast? None   Final Result      No acute intra-abdominopelvic abnormality. Gas within the urinary bladder likely from recent catheterization. Correlate with clinical findings. Chronic small left pleural effusion with associated chronic smooth left pleural thickening and atelectasis/consolidation of the visualized left lung base. Cholelithiasis. Nonobstructive left-sided nephrolithiasis. Renal cysts.           LABS:   Results for orders placed or performed during the hospital encounter of 10/27/22   CBC with Auto Differential   Result Value Ref Range    WBC 6.3 4.0 - 11.0 K/uL    RBC 2.40 (L) 4.20 - 5.90 M/uL    Hemoglobin 7.1 (L) 13.5 - 17.5 g/dL    Hematocrit 22.0 (L) 40.5 - 52.5 %    MCV 92.0 80.0 - 100.0 fL    MCH 29.6 26.0 - 34.0 pg    MCHC 32.2 31.0 - 36.0 g/dL    RDW 15.8 (H) 12.4 - 15.4 %    Platelets 268 553 - 350 K/uL    MPV 8.3 5.0 - 10.5 fL    Neutrophils % 72.7 %    Lymphocytes % 16.4 %    Monocytes % 7.5 %    Eosinophils % 2.8 %    Basophils % 0.6 %    Neutrophils Absolute 4.6 1.7 - 7.7 K/uL    Lymphocytes Absolute 1.0 1.0 - 5.1 K/uL    Monocytes Absolute 0.5 0.0 - 1.3 K/uL    Eosinophils Absolute 0.2 0.0 - 0.6 K/uL    Basophils Absolute 0.0 0.0 - 0.2 K/uL   CMP w/ Reflex to MG   Result Value Ref Range    Sodium 141 136 - 145 mmol/L    Potassium reflex Magnesium 4.6 3.5 - 5.1 mmol/L Chloride 103 99 - 110 mmol/L    CO2 24 21 - 32 mmol/L    Anion Gap 14 3 - 16    Glucose 141 (H) 70 - 99 mg/dL    BUN 20 7 - 20 mg/dL    Creatinine 1.0 0.8 - 1.3 mg/dL    Est, Glom Filt Rate >60 >60    Calcium 8.7 8.3 - 10.6 mg/dL    Total Protein 7.1 6.4 - 8.2 g/dL    Albumin 3.2 (L) 3.4 - 5.0 g/dL    Albumin/Globulin Ratio 0.8 (L) 1.1 - 2.2    Total Bilirubin 0.3 0.0 - 1.0 mg/dL    Alkaline Phosphatase 51 40 - 129 U/L    ALT 9 (L) 10 - 40 U/L    AST 10 (L) 15 - 37 U/L   TYPE AND SCREEN   Result Value Ref Range    ABO/Rh A POS     Antibody Screen NEG    PREPARE RBC (CROSSMATCH), 1 Units   Result Value Ref Range    Product Code Blood Bank [id number]     Description Blood Bank Red Blood Cells, Leuko-reduced     Unit Number [id number]     Dispense Status Blood Bank issued        RECENT VITALS:  BP: (!) 139/55, Temp: 97.6 °F (36.4 °C), Heart Rate: 58, Resp: 11, SpO2: 94 %           ED Course     ED Course as of 10/27/22 1859   u Oct 27, 2022   1724 Hemoglobin Quant(!): 7.1  Patient consented for blood transfusion. ORA negative for gross melena.   Given the downtrending hemoglobin with recent replacement of baclofen pump, will obtain CT abdomen pelvis to evaluate for slow intra-abdominal or retroperitoneal bleed [CG]   1725 CMP w/ Reflex to MG(!):    Sodium 141   Potassium 4.6   Chloride 103   CO2 24   Anion Gap 14   Glucose, Random 141(!)   BUN,BUNPL 20   Creatinine 1.0   Est, Glom Filt Rate >60   CALCIUM, SERUM, 348435 8.7   Total Protein 7.1   Albumin 3.2(!)   Albumin/Globulin Ratio 0.8(!)   Bilirubin 0.3   Alk Phos 51   ALT 9(!)   AST 10(!)  Unremarkable [CG]   1817 Patient transfused with 1 unit PRBC, will obtain 1 hour posttransfusion H&H. [CG]      ED Course User Index  [CG] Sravani Mejía MD       The patient was given the following medications:  Orders Placed This Encounter   Medications    0.9 % sodium chloride infusion    iopamidol (ISOVUE-370) 76 % injection 75 mL       CONSULTS:  None    MEDICAL DECISION MAKING / ASSESSMENT / PLAN     Radha Denny is a 81 y.o. male who presents to the ED for a hemoglobin of 6.1. Patient made hemodynamically stable throughout evaluation, and remained asymptomatic. He received a unit of blood while in the emergency department, with improvement of blood count 8.1 on reassessment. Given absence of any signs or symptoms of active bleeding, and ongoing hemodynamic stability, I feel patient is appropriate for discharge with outpatient follow-up with his blood count. At this time, patient was deemed appropriate for discharge, he was given return precautions, instructed to follow-up with his primary care physician in 2 weeks. This patient was also evaluated by the attending physician. All care plans were discussed and agreed upon. Clinical Impression     1. Anemia, unspecified type        Disposition     PATIENT REFERRED TO:  No follow-up provider specified.     DISCHARGE MEDICATIONS:  New Prescriptions    No medications on file       DISPOSITION  10/27/2022 05:46:21 PM         Jermaine Luna MD  10/27/22 2038

## 2022-10-27 NOTE — ED PROVIDER NOTES
[address] [facility] RESIDENT NOTE       Date of evaluation: 10/27/2022    Chief Complaint     Other (Abnormal lab: hgb 6.5)      of Present Illness     Hang Mcarthur is a 81 y.o. male with PMHx ofCAD s/p CABG, CHF, HTN, neurogenic bladder with chronic Iglesias, UTI, history of lower extremity venous wounds, cervical myelopathy s/p intrathecal baclofen pump (replaced 10/5/2022) who presents from his nursing facility with abnormal lab. Patient had routine outpatient labs performed today where he was noted to have a \"downtrending hemoglobin\", prompting his presentation to the emergency department. On arrival, the patient states that he is asymptomatic. He denies any hematochezia, hematuria, hematemesis, abdominal pain, chest pain or shortness of breath. Review of Systems   A complete review of systems was conducted on this patient and was negative except as noted in the HPI. Review of Systems   Constitutional:  Negative for chills and fever. HENT:  Negative for sore throat. Respiratory:  Negative for cough, chest tightness and shortness of breath. Cardiovascular:  Negative for chest pain, palpitations and leg swelling. Gastrointestinal:  Negative for abdominal distention, abdominal pain, constipation, diarrhea, nausea and vomiting. Genitourinary:  Negative for dysuria, frequency, hematuria and urgency. Musculoskeletal:  Negative for back pain and myalgias. Skin:  Negative for rash. Neurological:  Negative for weakness, light-headedness and headaches. Hematological:  Negative for adenopathy. Psychiatric/Behavioral:  The patient is not nervous/anxious.       Past Medical, Surgical, Family, and Social History     He has a past medical history of BPH (benign prostatic hyperplasia), C. difficile colitis, Carotid stenosis, Cellulitis, Chronic back pain, Encounter for imaging to screen for metal prior to MRI, GERD (gastroesophageal reflux disease), History of atrial fibrillation, Hypertension, Lower GI bleed, MDRO (multiple drug resistant organisms) resistance, Neuromuscular disorder (HCC), Renal insufficiency, Septic arthritis of interphalangeal joint of toe of right foot ([address] Systolic congestive heart failure ([facility] [address] and Vitamin B12 deficiency. 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 His family history includes Heart Disease in his father. He reports that he quit smoking about 52 years ago. He has never used smokeless tobacco. He reports that he does not drink alcohol and does not use drugs. Medications     Previous Medications    ACETAMINOPHEN (TYLENOL) 325 MG TABLET    Take 650 mg by mouth every 6 hours as needed for Pain    ALBUTEROL SULFATE HFA (PROVENTIL HFA) 108 (90 BASE) MCG/ACT INHALER    Inhale 2 puffs into the lungs every 6 hours as needed for Wheezing or Shortness of Breath    ATORVASTATIN (LIPITOR) 80 MG TABLET    Take 80 mg by mouth nightly.     BALSAM PERU-CASTOR OIL (VENELEX) OINT OINTMENT    Apply topically daily as needed    CIPROFLOXACIN (CIPRO) 500 MG TABLET    Take 1 tablet by mouth 2 times daily    DICYCLOMINE (BENTYL) 20 MG TABLET    Take 20 mg by mouth 3 times daily as needed    DOCUSATE SODIUM (COLACE) 100 MG CAPSULE    Take 100 mg by mouth every morning (before breakfast)    DOXYCYCLINE HYCLATE (VIBRA-TABS) 100 MG TABLET    Take 1 tablet by mouth 2 times daily    FERROUS SULFATE (IRON 325) 325 (65 FE) MG TABLET    Take 325 mg by mouth in the morning and at bedtime    FUROSEMIDE (LASIX) 40 MG TABLET    Take 1 tablet by mouth daily    GABAPENTIN (NEURONTIN) 600 MG TABLET    Take 600 mg by mouth 2 times daily. GUAIFENESIN (MUCINEX) 600 MG EXTENDED RELEASE TABLET    Take 1 tablet by mouth 2 times daily as needed for Congestion    LEVOTHYROXINE (SYNTHROID) 75 MCG TABLET    Take 1 tablet by mouth Daily    LOPERAMIDE (IMODIUM) 2 MG CAPSULE    Take 2 mg by mouth 4 times daily as needed for Diarrhea    MELATONIN 5 MG CAPS    Take 1 capsule by mouth nightly    MICONAZOLE (MICOTIN) 2 % CREAM    Apply topically 2 times daily. NUTRITIONAL SUPPLEMENTS (ENSURE HIGH PROTEIN) LIQD    Take 1 Can by mouth 2 times daily    NUTRITIONAL SUPPLEMENTS (RONEY PO)    Take 1 packet by mouth 2 times daily    PANTOPRAZOLE (PROTONIX) 40 MG TABLET    Take 1 tablet by mouth every morning (before breakfast)    TAMSULOSIN (FLOMAX) 0.4 MG CAPSULE    Take 0.8 mg by mouth daily     TRAZODONE (DESYREL) 100 MG TABLET    Take 100 mg by mouth nightly    VITAMIN B-12 (CYANOCOBALAMIN) 1000 MCG TABLET    Take 1,000 mcg by mouth daily    WOUND DRESSINGS (MEPILEX EX)    Apply topically Cleanse skin graft areas with normal saline, pat dry, apply mepilex foam board, change every 3 days as needed. Allergies     He is allergic to bactrim [sulfamethoxazole-trimethoprim]. Physical Exam     INITIAL VITALS: BP: 118/60, Temp: 97.4 °F (36.3 °C), Heart Rate: 62, Resp: 16, SpO2: 93 %   Physical Exam  Vitals reviewed. Constitutional:       General: He is not in acute distress. Appearance: He is ill-appearing. He is not toxic-appearing. HENT:      Head: Normocephalic and atraumatic. Mouth/Throat:      Mouth: Mucous membranes are moist.   Eyes:      Extraocular Movements: Extraocular movements intact. Pupils: Pupils are equal, round, and reactive to light. Cardiovascular:      Rate and Rhythm: Normal rate and regular rhythm. Pulses: Normal pulses. Heart sounds: Normal heart sounds.    Pulmonary:      Effort: Pulmonary effort is normal.      Breath sounds: Normal breath sounds. Abdominal:      Palpations: Abdomen is soft. Tenderness: There is abdominal tenderness in the periumbilical area and left lower quadrant. Comments: Mild tenderness to palpation   Genitourinary:     Rectum: Normal. Guaiac result negative. Musculoskeletal:      Cervical back: Normal range of motion. No rigidity. Skin:     General: Skin is warm and dry. Neurological:      General: No focal deficit present. Mental Status: He is alert and oriented to person, place, and time. Cranial Nerves: No cranial nerve deficit. Sensory: No sensory deficit. Motor: No weakness. Coordination: Coordination normal.      Gait: Gait normal.      Deep Tendon Reflexes: Reflexes normal.   Psychiatric:         Attention and Perception: He is inattentive. Cognition and Memory: Cognition is impaired. DiagnosticResults   RADIOLOGY:  CT ABDOMEN PELVIS W IV CONTRAST Additional Contrast? None   Final Result      No acute intra-abdominopelvic abnormality. Gas within the urinary bladder likely from recent catheterization. Correlate with clinical findings. Chronic small left pleural effusion with associated chronic smooth left pleural thickening and atelectasis/consolidation of the visualized left lung base. Cholelithiasis. Nonobstructive left-sided nephrolithiasis. Renal cysts.           LABS:   Results for orders placed or performed during the hospital encounter of 10/27/22   CBC with Auto Differential   Result Value Ref Range    WBC 6.3 4.0 - 11.0 K/uL    RBC 2.40 (L) 4.20 - 5.90 M/uL    Hemoglobin 7.1 (L) 13.5 - 17.5 g/dL    Hematocrit 22.0 (L) 40.5 - 52.5 %    MCV 92.0 80.0 - 100.0 fL    MCH 29.6 26.0 - 34.0 pg    MCHC 32.2 31.0 - 36.0 g/dL    RDW 15.8 (H) 12.4 - 15.4 %    Platelets 582 781 - 500 K/uL    MPV 8.3 5.0 - 10.5 fL    Neutrophils % 72.7 %    Lymphocytes % 16.4 %    Monocytes % 7.5 %    Eosinophils % 2.8 %    Basophils % 0.6 %    Neutrophils Absolute 4.6 1.7 - 7.7 K/uL    Lymphocytes Absolute 1.0 1.0 - 5.1 K/uL    Monocytes Absolute 0.5 0.0 - 1.3 K/uL    Eosinophils Absolute 0.2 0.0 - 0.6 K/uL    Basophils Absolute 0.0 0.0 - 0.2 K/uL   CMP w/ Reflex to MG   Result Value Ref Range    Sodium 141 136 - 145 mmol/L    Potassium reflex Magnesium 4.6 3.5 - 5.1 mmol/L    Chloride 103 99 - 110 mmol/L    CO2 24 21 - 32 mmol/L    Anion Gap 14 3 - 16    Glucose 141 (H) 70 - 99 mg/dL    BUN 20 7 - 20 mg/dL    Creatinine 1.0 0.8 - 1.3 mg/dL    Est, Glom Filt Rate >60 >60    Calcium 8.7 8.3 - 10.6 mg/dL    Total Protein 7.1 6.4 - 8.2 g/dL    Albumin 3.2 (L) 3.4 - 5.0 g/dL    Albumin/Globulin Ratio 0.8 (L) 1.1 - 2.2    Total Bilirubin 0.3 0.0 - 1.0 mg/dL    Alkaline Phosphatase 51 40 - 129 U/L    ALT 9 (L) 10 - 40 U/L    AST 10 (L) 15 - 37 U/L   TYPE AND SCREEN   Result Value Ref Range    ABO/Rh A POS     Antibody Screen NEG    PREPARE RBC (CROSSMATCH), 1 Units   Result Value Ref Range    Product Code Blood Bank [id number]     Description Blood Bank Red Blood Cells, Leuko-reduced     Unit Number [id number]     Dispense Status Blood Bank issued        ED BEDSIDE ULTRASOUND:  None    RECENT VITALS:  BP: (!) 139/55, Temp: 97.6 °F (36.4 °C), Heart Rate: 58,Resp: 11, SpO2: 94 %     Procedures     None    ED Course     Nursing Notes, Past Medical Hx, Past Surgical Hx, Social Hx, Allergies, and Family Hx were reviewed. The patient was given the followingmedications:  Orders Placed This Encounter   Medications    0.9 % sodium chloride infusion    iopamidol (ISOVUE-370) 76 % injection 75 mL       CONSULTS:  None    MEDICAL DECISION MAKING / ASSESSMENT / PLAN     Lesly Ahn is a 81 y.o. male with PMHx ofCAD s/p CABG, CHF, HTN, neurogenic bladder with chronic Iglesias, UTI, history of lower extremity venous wounds, cervical myelopathy s/p intrathecal baclofen pump (replaced 10/5/2022) who presents from his nursing facility with abnormal lab.     On arrival, patient is chronically ill-appearing, however not acutely toxic, afebrile and hemodynamically stable. ED Course as of 10/27/22 1818   Thu Oct 27, 2022   1724 Hemoglobin Quant(!): 7.1  Patient consented for blood transfusion. ORA negative for gross melena. Given the downtrending hemoglobin with recent replacement of baclofen pump, will obtain CT abdomen pelvis to evaluate for slow intra-abdominal or retroperitoneal bleed [CG]   1725 CMP w/ Reflex to MG(!):    Sodium 141   Potassium 4.6   Chloride 103   CO2 24   Anion Gap 14   Glucose, Random 141(!)   BUN,BUNPL 20   Creatinine 1.0   Est, Glom Filt Rate >60   CALCIUM, SERUM, 280740 8.7   Total Protein 7.1   Albumin 3.2(!)   Albumin/Globulin Ratio 0.8(!)   Bilirubin 0.3   Alk Phos 51   ALT 9(!)   AST 10(!)  Unremarkable [CG]   1817 Patient transfused with 1 unit PRBC, will obtain 1 hour posttransfusion H&H. [CG]      ED Course User Index  [CG] Esau Lynch MD     At this time, patient will be signed out to Dr. Dillon Awan. His responsibilities will include:    -Follow-up pending H&H  -Reassessment disposition    Clinical Impression     1.  Anemia, unspecified type           Esau Lynch MD  Resident  10/27/22 1973

## 2022-10-28 NOTE — DISCHARGE INSTRUCTIONS
You were seen in the emergency department for low blood count. The exact cause of your low blood count is unknown, however he did not have any signs or symptoms to suggest active bleeding and your vitals were stable here. We gave you a unit of blood with improvement on reassessment. Please follow-up with your doctor in the next 2 weeks to reassess your blood count. Please return to the emergency department you have any chest pain, shortness of breath, lightheaded/dizziness or weakness.

## 2022-11-03 ENCOUNTER — HOSPITAL ENCOUNTER (EMERGENCY)
Age: 82
Discharge: HOME | End: 2022-11-03
Attending: STUDENT IN AN ORGANIZED HEALTH CARE EDUCATION/TRAINING PROGRAM
Payer: MEDICARE

## 2022-11-03 VITALS
SYSTOLIC BLOOD PRESSURE: 117 MMHG | OXYGEN SATURATION: 92 % | TEMPERATURE: 98.5 F | DIASTOLIC BLOOD PRESSURE: 55 MMHG | RESPIRATION RATE: 16 BRPM | HEART RATE: 65 BPM

## 2022-11-03 DIAGNOSIS — D64.9 ANEMIA, UNSPECIFIED TYPE: Primary | ICD-10-CM

## 2022-11-03 LAB
ABO/RH: NORMAL
ALBUMIN SERPL-MCNC: 2.9 G/DL (ref 3.4–5)
ALP BLD-CCNC: 52 U/L (ref 40–129)
ALT SERPL-CCNC: 27 U/L (ref 10–40)
ANION GAP SERPL CALCULATED.3IONS-SCNC: 7 MMOL/L (ref 3–16)
ANTIBODY SCREEN: NORMAL
AST SERPL-CCNC: 25 U/L (ref 15–37)
BASOPHILS ABSOLUTE: 0.1 K/UL (ref 0–0.2)
BASOPHILS RELATIVE PERCENT: 1.1 %
BILIRUB SERPL-MCNC: 0.4 MG/DL (ref 0–1)
BILIRUBIN DIRECT: <0.2 MG/DL (ref 0–0.3)
BILIRUBIN, INDIRECT: ABNORMAL MG/DL (ref 0–1)
BUN BLDV-MCNC: 25 MG/DL (ref 7–20)
CALCIUM SERPL-MCNC: 8.2 MG/DL (ref 8.3–10.6)
CHLORIDE BLD-SCNC: 106 MMOL/L (ref 99–110)
CO2: 27 MMOL/L (ref 21–32)
CREAT SERPL-MCNC: 0.8 MG/DL (ref 0.8–1.3)
EOSINOPHILS ABSOLUTE: 0.4 K/UL (ref 0–0.6)
EOSINOPHILS RELATIVE PERCENT: 4.8 %
GFR SERPL CREATININE-BSD FRML MDRD: >60 ML/MIN/{1.73_M2}
GLUCOSE BLD-MCNC: 121 MG/DL (ref 70–99)
HCT VFR BLD CALC: 23.3 % (ref 40.5–52.5)
HEMOGLOBIN: 7.5 G/DL (ref 13.5–17.5)
INR BLD: 1.29 (ref 0.87–1.14)
LYMPHOCYTES ABSOLUTE: 1.4 K/UL (ref 1–5.1)
LYMPHOCYTES RELATIVE PERCENT: 17.9 %
MCH RBC QN AUTO: 29.6 PG (ref 26–34)
MCHC RBC AUTO-ENTMCNC: 32.3 G/DL (ref 31–36)
MCV RBC AUTO: 91.7 FL (ref 80–100)
MONOCYTES ABSOLUTE: 1.1 K/UL (ref 0–1.3)
MONOCYTES RELATIVE PERCENT: 14 %
NEUTROPHILS ABSOLUTE: 4.8 K/UL (ref 1.7–7.7)
NEUTROPHILS RELATIVE PERCENT: 62.2 %
PDW BLD-RTO: 17.3 % (ref 12.4–15.4)
PLATELET # BLD: 197 K/UL (ref 135–450)
PMV BLD AUTO: 8.5 FL (ref 5–10.5)
POTASSIUM REFLEX MAGNESIUM: 4.4 MMOL/L (ref 3.5–5.1)
PROTHROMBIN TIME: 16 SEC (ref 11.7–14.5)
RBC # BLD: 2.54 M/UL (ref 4.2–5.9)
SODIUM BLD-SCNC: 140 MMOL/L (ref 136–145)
TOTAL PROTEIN: 6.4 G/DL (ref 6.4–8.2)
WBC # BLD: 7.7 K/UL (ref 4–11)

## 2022-11-03 PROCEDURE — 80076 HEPATIC FUNCTION PANEL: CPT

## 2022-11-03 PROCEDURE — 85025 COMPLETE CBC W/AUTO DIFF WBC: CPT

## 2022-11-03 PROCEDURE — 80048 BASIC METABOLIC PNL TOTAL CA: CPT

## 2022-11-03 PROCEDURE — 99283 EMERGENCY DEPT VISIT LOW MDM: CPT

## 2022-11-03 PROCEDURE — 85610 PROTHROMBIN TIME: CPT

## 2022-11-03 PROCEDURE — 86850 RBC ANTIBODY SCREEN: CPT

## 2022-11-03 PROCEDURE — 86900 BLOOD TYPING SEROLOGIC ABO: CPT

## 2022-11-03 PROCEDURE — 86901 BLOOD TYPING SEROLOGIC RH(D): CPT

## 2022-11-03 PROCEDURE — 36415 COLL VENOUS BLD VENIPUNCTURE: CPT

## 2022-11-03 ASSESSMENT — PAIN - FUNCTIONAL ASSESSMENT: PAIN_FUNCTIONAL_ASSESSMENT: 0-10

## 2022-11-03 ASSESSMENT — PAIN DESCRIPTION - FREQUENCY: FREQUENCY: CONTINUOUS

## 2022-11-03 ASSESSMENT — PAIN DESCRIPTION - LOCATION: LOCATION: FOOT

## 2022-11-03 ASSESSMENT — PAIN DESCRIPTION - DESCRIPTORS: DESCRIPTORS: BURNING;NUMBNESS

## 2022-11-03 ASSESSMENT — PAIN DESCRIPTION - ORIENTATION: ORIENTATION: RIGHT;LEFT

## 2022-11-03 ASSESSMENT — PAIN SCALES - GENERAL: PAINLEVEL_OUTOF10: 4

## 2022-11-03 ASSESSMENT — PAIN DESCRIPTION - PAIN TYPE: TYPE: CHRONIC PAIN

## 2022-11-03 NOTE — ED PROVIDER NOTES
[id number]          ATTENDING PHYSICIAN NOTE       Date of evaluation: 11/3/2022    Chief Complaint     Abnormal Lab (Patient sent from [facility] for abnormal HGB of 6.9. patient states his blood lever is low because the other day they used a raymundo lift and it gouged his left leg and he was bleeding a lot. )      History of Present Illness     Clau Moyer is a 81 y.o. male CAD s/p CABG, CHF, HTN, neurogenic bladder with chronic Iglesias, UTI, history of lower extremity venous wounds, cervical myelopathy s/p intrathecal baclofen, lower extremity still myelitis pump who presents from his nursing facility with abnormal lab reported hemoglobin 6.9 at outpatient baseline testing. Currently denies any complaints states he does not know why he is here denies any melena hematemesis nausea vomiting or any other symptoms with this. Of note has been seen for anemia before had EGD with no active bleeding at the end of September. Had baclofen pump exchange couple weeks ago and has an incision there with some mild bruising over the area but he actually presented about 7 days ago here CT abdomen pelvis of the abdomen showed no active bleeding at that time. That time his hemoglobin was 7.1 he was transfused 1 unit.       Review of Systems  A complete ROS was performed and is otherwise negative      Past Medical, Surgical, Family, and Social History     He has a past medical history of BPH (benign prostatic hyperplasia), C. difficile colitis, Carotid stenosis, Cellulitis, Chronic back pain, Encounter for imaging to screen for metal prior to MRI, GERD (gastroesophageal reflux disease), History of atrial fibrillation, Hypertension, Lower GI bleed, MDRO (multiple drug resistant organisms) resistance, Neuromuscular disorder ([address] Renal insufficiency, Septic arthritis of interphalangeal joint of toe of right foot ([address] Systolic congestive heart failure ([address] and Vitamin B12 deficiency. 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 His family history includes Heart Disease in his father. He reports that he quit smoking about 52 years ago. He has never used smokeless tobacco. He reports that he does not drink alcohol and does not use drugs. Medications     Previous Medications    ACETAMINOPHEN (TYLENOL) 325 MG TABLET    Take 650 mg by mouth every 6 hours as needed for Pain    ALBUTEROL SULFATE HFA (PROVENTIL HFA) 108 (90 BASE) MCG/ACT INHALER    Inhale 2 puffs into the lungs every 6 hours as needed for Wheezing or Shortness of Breath    ATORVASTATIN (LIPITOR) 80 MG TABLET    Take 80 mg by mouth nightly. BALSAM PERU-CASTOR OIL (VENELEX) OINT OINTMENT    Apply topically daily as needed    CIPROFLOXACIN (CIPRO) 500 MG TABLET    Take 1 tablet by mouth 2 times daily    DICYCLOMINE (BENTYL) 20 MG TABLET    Take 20 mg by mouth 3 times daily as needed    DOCUSATE SODIUM (COLACE) 100 MG CAPSULE    Take 100 mg by mouth every morning (before breakfast)    DOXYCYCLINE HYCLATE (VIBRA-TABS) 100 MG TABLET    Take 1 tablet by mouth 2 times daily    FERROUS SULFATE (IRON 325) 325 (65 FE) MG TABLET    Take 325 mg by mouth in the morning and at bedtime    FUROSEMIDE (LASIX) 40 MG TABLET    Take 1 tablet by mouth daily    GABAPENTIN (NEURONTIN) 600 MG TABLET    Take 600 mg by mouth 2 times daily.     GUAIFENESIN (MUCINEX) 600 MG EXTENDED RELEASE TABLET    Take 1 tablet by mouth 2 times daily as needed for Congestion    LEVOTHYROXINE (SYNTHROID) 75 MCG TABLET    Take 1 tablet by mouth Daily LOPERAMIDE (IMODIUM) 2 MG CAPSULE    Take 2 mg by mouth 4 times daily as needed for Diarrhea    MELATONIN 5 MG CAPS    Take 1 capsule by mouth nightly    MICONAZOLE (MICOTIN) 2 % CREAM    Apply topically 2 times daily. NUTRITIONAL SUPPLEMENTS (ENSURE HIGH PROTEIN) LIQD    Take 1 Can by mouth 2 times daily    NUTRITIONAL SUPPLEMENTS (RONEY PO)    Take 1 packet by mouth 2 times daily    PANTOPRAZOLE (PROTONIX) 40 MG TABLET    Take 1 tablet by mouth every morning (before breakfast)    TAMSULOSIN (FLOMAX) 0.4 MG CAPSULE    Take 0.8 mg by mouth daily     TRAZODONE (DESYREL) 100 MG TABLET    Take 100 mg by mouth nightly    VITAMIN B-12 (CYANOCOBALAMIN) 1000 MCG TABLET    Take 1,000 mcg by mouth daily    WOUND DRESSINGS (MEPILEX EX)    Apply topically Cleanse skin graft areas with normal saline, pat dry, apply mepilex foam board, change every 3 days as needed. Allergies     He is allergic to bactrim [sulfamethoxazole-trimethoprim]. Physical Exam     INITIAL VITALS: BP: (!) 117/55, Temp: 98.5 °F (36.9 °C), Heart Rate: 65, Resp: 16, SpO2: 92 %   Physical Exam  General: Chronically ill-appearing male lying in bed in no acute distress  HEENT:  head is atraumatic, sclera are clear, oropharynx is nonerythematous, mucus membranes are moist  Neck: Trachea midline  Chest: Nonlabored respirations, clear to auscultation bilaterally  Cardiovascular: Regular rate and rhythm, 2+ radial pulses bilaterally  Abdominal: Nondistended abdomen, soft, lower quadrant abdominal incision nontender some very minimal ecchymosis but no significant swelling no signs of infection. Rectal examination with no evidence of melena with normal stool. Skin: Warm, dry well perfused, no rashes  Extremities: no obvious deformities, no tenderness to palpation diffusely.   Very small abrasion to the left anterior shin with no gaping wound and no active bleeding  Neurologic: Alert answering questions moving all extremities  psychologic: appropriate mood and affect   Diagnostic Results     EKG       RADIOLOGY:  No orders to display       LABS:   Results for orders placed or performed during the hospital encounter of 11/03/22   CBC with Auto Differential   Result Value Ref Range    WBC 7.7 4.0 - 11.0 K/uL    RBC 2.54 (L) 4.20 - 5.90 M/uL    Hemoglobin 7.5 (L) 13.5 - 17.5 g/dL    Hematocrit 23.3 (L) 40.5 - 52.5 %    MCV 91.7 80.0 - 100.0 fL    MCH 29.6 26.0 - 34.0 pg    MCHC 32.3 31.0 - 36.0 g/dL    RDW 17.3 (H) 12.4 - 15.4 %    Platelets 270 968 - 013 K/uL    MPV 8.5 5.0 - 10.5 fL    Neutrophils % 62.2 %    Lymphocytes % 17.9 %    Monocytes % 14.0 %    Eosinophils % 4.8 %    Basophils % 1.1 %    Neutrophils Absolute 4.8 1.7 - 7.7 K/uL    Lymphocytes Absolute 1.4 1.0 - 5.1 K/uL    Monocytes Absolute 1.1 0.0 - 1.3 K/uL    Eosinophils Absolute 0.4 0.0 - 0.6 K/uL    Basophils Absolute 0.1 0.0 - 0.2 K/uL   Basic Metabolic Panel w/ Reflex to MG   Result Value Ref Range    Sodium 140 136 - 145 mmol/L    Potassium reflex Magnesium 4.4 3.5 - 5.1 mmol/L    Chloride 106 99 - 110 mmol/L    CO2 27 21 - 32 mmol/L    Anion Gap 7 3 - 16    Glucose 121 (H) 70 - 99 mg/dL    BUN 25 (H) 7 - 20 mg/dL    Creatinine 0.8 0.8 - 1.3 mg/dL    Est, Glom Filt Rate >60 >60    Calcium 8.2 (L) 8.3 - 10.6 mg/dL   Hepatic Function Panel   Result Value Ref Range    Total Protein 6.4 6.4 - 8.2 g/dL    Albumin 2.9 (L) 3.4 - 5.0 g/dL    Alkaline Phosphatase 52 40 - 129 U/L    ALT 27 10 - 40 U/L    AST 25 15 - 37 U/L    Total Bilirubin 0.4 0.0 - 1.0 mg/dL    Bilirubin, Direct <0.2 0.0 - 0.3 mg/dL    Bilirubin, Indirect see below 0.0 - 1.0 mg/dL   Protime-INR   Result Value Ref Range    Protime 16.0 (H) 11.7 - 14.5 sec    INR 1.29 (H) 0.87 - 1.14       ED BEDSIDE ULTRASOUND:      RECENT VITALS:  BP: (!) 117/55,Temp: 98.5 °F (36.9 °C), Heart Rate: 65, Resp: 16, SpO2: 92 %     Procedures         ED Course     Nursing Notes, Past Medical Hx, Past Surgical Hx, Social Hx,Allergies, and Family Hx were reviewed. patient was given the following medications:  No orders of the defined types were placed in this encounter. CONSULTS:  None    MEDICAL DECISIONMAKING / ASSESSMENT / PLAN     Radha Denny is a 81 y.o. male who presents today with abnormal lab with outpatient hemoglobin of 6.9. Here his hemoglobin was 7.5. He has had issues of recurrent anemia with concern for GI bleed but had negative EGD just with gastritis. This was done in September. He also had concern for low hemoglobin and anemia in early October and had a negative occult stool was transfused 1 unit and discharged. He then returned and had a hemoglobin of 7.1 head a transfusion performed and hemoglobin was 8.1. He had a CT abdomen pelvis at that time which was 1 week ago that showed no bleeding around surgical site. No obvious GI bleed at that time either. Here he has no obvious bleeding around the surgical site hemoglobin was initially 6.9 but now our lab draws 7.5. I suspect lab error from outpatient not negative currently by our standards of need for acute transfusion. He mentioned possibly left lower extremity laceration but he is a small wound over the anterior shin does not require stitches no active bleeding. he also had a rectal exam here that shows normal stool with no melena no bleeding associated with this. Also is asymptomatic and denies any current issues. I suspect this could be anemia of chronic disease disease given the work-ups and given his multiple issues. At this point time he does not require emergent admission or emergent transfusion so will transfer back to his facility with a stable hemoglobin of 7.5 currently should have repeat blood work in 1 week and follow-up with his primary care physician. Clinical Impression     1. Anemia, unspecified type        Disposition     PATIENT REFERRED TO:  No follow-up provider specified.     DISCHARGE MEDICATIONS:  New Prescriptions    No medications on file DISPOSITION Decision To Discharge 11/03/2022 03:56:09 PM       Terra Mckeon MD  11/03/22 [id number]

## 2022-11-19 ENCOUNTER — HOSPITAL ENCOUNTER (INPATIENT)
Age: 82
LOS: 2 days | Discharge: NURSING, REHAB OR LONG-TERM CARE | DRG: 699 | End: 2022-11-22
Attending: EMERGENCY MEDICINE | Admitting: INTERNAL MEDICINE
Payer: MEDICARE

## 2022-11-19 DIAGNOSIS — N39.0 COMPLICATED UTI (URINARY TRACT INFECTION): Primary | ICD-10-CM

## 2022-11-19 LAB
ANION GAP SERPL CALCULATED.3IONS-SCNC: 9 MMOL/L (ref 3–16)
BASOPHILS ABSOLUTE: 0 K/UL (ref 0–0.2)
BASOPHILS RELATIVE PERCENT: 0.6 %
BUN BLDV-MCNC: 26 MG/DL (ref 7–20)
CALCIUM SERPL-MCNC: 8 MG/DL (ref 8.3–10.6)
CHLORIDE BLD-SCNC: 105 MMOL/L (ref 99–110)
CO2: 28 MMOL/L (ref 21–32)
CREAT SERPL-MCNC: 0.7 MG/DL (ref 0.8–1.3)
EOSINOPHILS ABSOLUTE: 0.4 K/UL (ref 0–0.6)
EOSINOPHILS RELATIVE PERCENT: 6 %
GFR SERPL CREATININE-BSD FRML MDRD: >60 ML/MIN/{1.73_M2}
GLUCOSE BLD-MCNC: 110 MG/DL (ref 70–99)
HCT VFR BLD CALC: 25.5 % (ref 40.5–52.5)
HEMOGLOBIN: 8 G/DL (ref 13.5–17.5)
LYMPHOCYTES ABSOLUTE: 1 K/UL (ref 1–5.1)
LYMPHOCYTES RELATIVE PERCENT: 15.1 %
MCH RBC QN AUTO: 27 PG (ref 26–34)
MCHC RBC AUTO-ENTMCNC: 31.5 G/DL (ref 31–36)
MCV RBC AUTO: 85.9 FL (ref 80–100)
MONOCYTES ABSOLUTE: 0.5 K/UL (ref 0–1.3)
MONOCYTES RELATIVE PERCENT: 6.9 %
NEUTROPHILS ABSOLUTE: 4.7 K/UL (ref 1.7–7.7)
NEUTROPHILS RELATIVE PERCENT: 71.4 %
PDW BLD-RTO: 17.9 % (ref 12.4–15.4)
PLATELET # BLD: 317 K/UL (ref 135–450)
PMV BLD AUTO: 8.5 FL (ref 5–10.5)
POTASSIUM REFLEX MAGNESIUM: 3.7 MMOL/L (ref 3.5–5.1)
RBC # BLD: 2.97 M/UL (ref 4.2–5.9)
SODIUM BLD-SCNC: 142 MMOL/L (ref 136–145)
WBC # BLD: 6.6 K/UL (ref 4–11)

## 2022-11-19 PROCEDURE — 81001 URINALYSIS AUTO W/SCOPE: CPT

## 2022-11-19 PROCEDURE — 99285 EMERGENCY DEPT VISIT HI MDM: CPT

## 2022-11-19 PROCEDURE — 80048 BASIC METABOLIC PNL TOTAL CA: CPT

## 2022-11-19 PROCEDURE — 87086 URINE CULTURE/COLONY COUNT: CPT

## 2022-11-19 PROCEDURE — 85025 COMPLETE CBC W/AUTO DIFF WBC: CPT

## 2022-11-19 PROCEDURE — 87186 SC STD MICRODIL/AGAR DIL: CPT

## 2022-11-19 PROCEDURE — 87077 CULTURE AEROBIC IDENTIFY: CPT

## 2022-11-19 ASSESSMENT — PAIN SCALES - GENERAL: PAINLEVEL_OUTOF10: 8

## 2022-11-19 ASSESSMENT — PAIN - FUNCTIONAL ASSESSMENT: PAIN_FUNCTIONAL_ASSESSMENT: 0-10

## 2022-11-19 ASSESSMENT — PAIN DESCRIPTION - LOCATION: LOCATION: PENIS

## 2022-11-20 PROBLEM — D64.9 NORMOCYTIC ANEMIA: Status: ACTIVE | Noted: 2022-11-20

## 2022-11-20 LAB
AMORPHOUS: ABNORMAL /HPF
BACTERIA: ABNORMAL /HPF
BILIRUBIN URINE: NEGATIVE
BLOOD, URINE: ABNORMAL
CLARITY: CLEAR
COLOR: YELLOW
GLUCOSE URINE: NEGATIVE MG/DL
KETONES, URINE: NEGATIVE MG/DL
LACTIC ACID: 1.5 MMOL/L (ref 0.4–2)
LEUKOCYTE ESTERASE, URINE: ABNORMAL
MICROSCOPIC EXAMINATION: YES
NITRITE, URINE: NEGATIVE
PH UA: 6 (ref 5–8)
PROTEIN UA: 100 MG/DL
RBC UA: ABNORMAL /HPF (ref 0–4)
SPECIFIC GRAVITY UA: 1.02 (ref 1–1.03)
URINE TYPE: ABNORMAL
UROBILINOGEN, URINE: 0.2 E.U./DL
WBC UA: ABNORMAL /HPF (ref 0–5)

## 2022-11-20 PROCEDURE — 6370000000 HC RX 637 (ALT 250 FOR IP): Performed by: EMERGENCY MEDICINE

## 2022-11-20 PROCEDURE — 36415 COLL VENOUS BLD VENIPUNCTURE: CPT

## 2022-11-20 PROCEDURE — 2580000003 HC RX 258: Performed by: EMERGENCY MEDICINE

## 2022-11-20 PROCEDURE — 6360000002 HC RX W HCPCS: Performed by: EMERGENCY MEDICINE

## 2022-11-20 PROCEDURE — 94664 DEMO&/EVAL PT USE INHALER: CPT

## 2022-11-20 PROCEDURE — 6370000000 HC RX 637 (ALT 250 FOR IP): Performed by: INTERNAL MEDICINE

## 2022-11-20 PROCEDURE — 2580000003 HC RX 258: Performed by: INTERNAL MEDICINE

## 2022-11-20 PROCEDURE — 96365 THER/PROPH/DIAG IV INF INIT: CPT

## 2022-11-20 PROCEDURE — 6360000002 HC RX W HCPCS: Performed by: INTERNAL MEDICINE

## 2022-11-20 PROCEDURE — 83605 ASSAY OF LACTIC ACID: CPT

## 2022-11-20 PROCEDURE — 1200000000 HC SEMI PRIVATE

## 2022-11-20 RX ORDER — ACETAMINOPHEN 325 MG/1
650 TABLET ORAL EVERY 6 HOURS PRN
Status: DISCONTINUED | OUTPATIENT
Start: 2022-11-20 | End: 2022-11-20 | Stop reason: SDUPTHER

## 2022-11-20 RX ORDER — TRAZODONE HYDROCHLORIDE 100 MG/1
100 TABLET ORAL NIGHTLY
Status: DISCONTINUED | OUTPATIENT
Start: 2022-11-20 | End: 2022-11-22 | Stop reason: HOSPADM

## 2022-11-20 RX ORDER — LANOLIN ALCOHOL/MO/W.PET/CERES
1000 CREAM (GRAM) TOPICAL DAILY
Status: DISCONTINUED | OUTPATIENT
Start: 2022-11-20 | End: 2022-11-22 | Stop reason: HOSPADM

## 2022-11-20 RX ORDER — SODIUM CHLORIDE 0.9 % (FLUSH) 0.9 %
5-40 SYRINGE (ML) INJECTION EVERY 12 HOURS SCHEDULED
Status: DISCONTINUED | OUTPATIENT
Start: 2022-11-20 | End: 2022-11-22 | Stop reason: HOSPADM

## 2022-11-20 RX ORDER — FERROUS SULFATE 325(65) MG
325 TABLET ORAL 2 TIMES DAILY
Status: DISCONTINUED | OUTPATIENT
Start: 2022-11-20 | End: 2022-11-22 | Stop reason: HOSPADM

## 2022-11-20 RX ORDER — DOCUSATE SODIUM 100 MG/1
100 CAPSULE, LIQUID FILLED ORAL
Status: DISCONTINUED | OUTPATIENT
Start: 2022-11-20 | End: 2022-11-22 | Stop reason: HOSPADM

## 2022-11-20 RX ORDER — ALBUTEROL SULFATE 2.5 MG/3ML
2.5 SOLUTION RESPIRATORY (INHALATION) EVERY 4 HOURS PRN
Status: DISCONTINUED | OUTPATIENT
Start: 2022-11-20 | End: 2022-11-22 | Stop reason: HOSPADM

## 2022-11-20 RX ORDER — CASTOR OIL AND BALSAM, PERU 788; 87 MG/G; MG/G
OINTMENT TOPICAL 2 TIMES DAILY
Status: DISCONTINUED | OUTPATIENT
Start: 2022-11-20 | End: 2022-11-22 | Stop reason: HOSPADM

## 2022-11-20 RX ORDER — ENOXAPARIN SODIUM 100 MG/ML
40 INJECTION SUBCUTANEOUS DAILY
Status: DISCONTINUED | OUTPATIENT
Start: 2022-11-20 | End: 2022-11-22 | Stop reason: HOSPADM

## 2022-11-20 RX ORDER — ACETAMINOPHEN 650 MG/1
650 SUPPOSITORY RECTAL EVERY 6 HOURS PRN
Status: DISCONTINUED | OUTPATIENT
Start: 2022-11-20 | End: 2022-11-22 | Stop reason: HOSPADM

## 2022-11-20 RX ORDER — ACETAMINOPHEN 325 MG/1
650 TABLET ORAL EVERY 6 HOURS PRN
Status: DISCONTINUED | OUTPATIENT
Start: 2022-11-20 | End: 2022-11-22 | Stop reason: HOSPADM

## 2022-11-20 RX ORDER — ATORVASTATIN CALCIUM 40 MG/1
80 TABLET, FILM COATED ORAL NIGHTLY
Status: DISCONTINUED | OUTPATIENT
Start: 2022-11-20 | End: 2022-11-22 | Stop reason: HOSPADM

## 2022-11-20 RX ORDER — POLYETHYLENE GLYCOL 3350 17 G/17G
17 POWDER, FOR SOLUTION ORAL DAILY PRN
Status: DISCONTINUED | OUTPATIENT
Start: 2022-11-20 | End: 2022-11-22 | Stop reason: HOSPADM

## 2022-11-20 RX ORDER — MECOBALAMIN 5000 MCG
5 TABLET,DISINTEGRATING ORAL NIGHTLY
Status: DISCONTINUED | OUTPATIENT
Start: 2022-11-20 | End: 2022-11-22 | Stop reason: HOSPADM

## 2022-11-20 RX ORDER — ONDANSETRON 2 MG/ML
4 INJECTION INTRAMUSCULAR; INTRAVENOUS EVERY 6 HOURS PRN
Status: DISCONTINUED | OUTPATIENT
Start: 2022-11-20 | End: 2022-11-22 | Stop reason: HOSPADM

## 2022-11-20 RX ORDER — SODIUM CHLORIDE 0.9 % (FLUSH) 0.9 %
5-40 SYRINGE (ML) INJECTION PRN
Status: DISCONTINUED | OUTPATIENT
Start: 2022-11-20 | End: 2022-11-22 | Stop reason: HOSPADM

## 2022-11-20 RX ORDER — LEVOTHYROXINE SODIUM 0.07 MG/1
75 TABLET ORAL DAILY
Status: DISCONTINUED | OUTPATIENT
Start: 2022-11-20 | End: 2022-11-22 | Stop reason: HOSPADM

## 2022-11-20 RX ORDER — PANTOPRAZOLE SODIUM 40 MG/1
40 TABLET, DELAYED RELEASE ORAL
Status: DISCONTINUED | OUTPATIENT
Start: 2022-11-20 | End: 2022-11-22 | Stop reason: HOSPADM

## 2022-11-20 RX ORDER — SODIUM CHLORIDE 9 MG/ML
INJECTION, SOLUTION INTRAVENOUS PRN
Status: DISCONTINUED | OUTPATIENT
Start: 2022-11-20 | End: 2022-11-22 | Stop reason: HOSPADM

## 2022-11-20 RX ORDER — TAMSULOSIN HYDROCHLORIDE 0.4 MG/1
0.4 CAPSULE ORAL DAILY
Status: DISCONTINUED | OUTPATIENT
Start: 2022-11-20 | End: 2022-11-22 | Stop reason: HOSPADM

## 2022-11-20 RX ORDER — FUROSEMIDE 40 MG/1
40 TABLET ORAL DAILY
Status: DISCONTINUED | OUTPATIENT
Start: 2022-11-20 | End: 2022-11-22 | Stop reason: HOSPADM

## 2022-11-20 RX ORDER — ONDANSETRON 4 MG/1
4 TABLET, ORALLY DISINTEGRATING ORAL EVERY 8 HOURS PRN
Status: DISCONTINUED | OUTPATIENT
Start: 2022-11-20 | End: 2022-11-22 | Stop reason: HOSPADM

## 2022-11-20 RX ORDER — GABAPENTIN 600 MG/1
600 TABLET ORAL 2 TIMES DAILY
Status: DISCONTINUED | OUTPATIENT
Start: 2022-11-20 | End: 2022-11-22 | Stop reason: HOSPADM

## 2022-11-20 RX ORDER — DICYCLOMINE HCL 20 MG
20 TABLET ORAL 3 TIMES DAILY PRN
Status: DISCONTINUED | OUTPATIENT
Start: 2022-11-20 | End: 2022-11-22 | Stop reason: HOSPADM

## 2022-11-20 RX ORDER — LIDOCAINE HYDROCHLORIDE 20 MG/ML
JELLY TOPICAL PRN
Status: DISCONTINUED | OUTPATIENT
Start: 2022-11-20 | End: 2022-11-22 | Stop reason: HOSPADM

## 2022-11-20 RX ADMIN — LIDOCAINE HYDROCHLORIDE: 20 JELLY TOPICAL at 08:11

## 2022-11-20 RX ADMIN — LEVOTHYROXINE SODIUM 75 MCG: 0.07 TABLET ORAL at 05:20

## 2022-11-20 RX ADMIN — FERROUS SULFATE TAB 325 MG (65 MG ELEMENTAL FE) 325 MG: 325 (65 FE) TAB at 20:24

## 2022-11-20 RX ADMIN — TRAZODONE HYDROCHLORIDE 100 MG: 100 TABLET ORAL at 20:24

## 2022-11-20 RX ADMIN — FUROSEMIDE 40 MG: 40 TABLET ORAL at 08:11

## 2022-11-20 RX ADMIN — CEFEPIME HYDROCHLORIDE 2000 MG: 2 INJECTION, POWDER, FOR SOLUTION INTRAMUSCULAR; INTRAVENOUS at 20:30

## 2022-11-20 RX ADMIN — ENOXAPARIN SODIUM 40 MG: 100 INJECTION SUBCUTANEOUS at 08:11

## 2022-11-20 RX ADMIN — CEFEPIME HYDROCHLORIDE 2000 MG: 2 INJECTION, POWDER, FOR SOLUTION INTRAMUSCULAR; INTRAVENOUS at 08:22

## 2022-11-20 RX ADMIN — SODIUM CHLORIDE, PRESERVATIVE FREE 10 ML: 5 INJECTION INTRAVENOUS at 08:12

## 2022-11-20 RX ADMIN — LIDOCAINE HYDROCHLORIDE 1 ML: 20 JELLY TOPICAL at 05:54

## 2022-11-20 RX ADMIN — ATORVASTATIN CALCIUM 80 MG: 40 TABLET, FILM COATED ORAL at 20:24

## 2022-11-20 RX ADMIN — CYANOCOBALAMIN TAB 1000 MCG 1000 MCG: 1000 TAB at 08:11

## 2022-11-20 RX ADMIN — TAMSULOSIN HYDROCHLORIDE 0.4 MG: 0.4 CAPSULE ORAL at 08:10

## 2022-11-20 RX ADMIN — Medication: at 21:50

## 2022-11-20 RX ADMIN — Medication: at 01:51

## 2022-11-20 RX ADMIN — FERROUS SULFATE TAB 325 MG (65 MG ELEMENTAL FE) 325 MG: 325 (65 FE) TAB at 08:10

## 2022-11-20 RX ADMIN — SODIUM CHLORIDE, PRESERVATIVE FREE 10 ML: 5 INJECTION INTRAVENOUS at 20:30

## 2022-11-20 RX ADMIN — CEFEPIME 2000 MG: 2 INJECTION, POWDER, FOR SOLUTION INTRAMUSCULAR; INTRAVENOUS at 01:51

## 2022-11-20 RX ADMIN — DOCUSATE SODIUM 100 MG: 100 CAPSULE, LIQUID FILLED ORAL at 05:20

## 2022-11-20 RX ADMIN — SODIUM CHLORIDE 25 ML: 9 INJECTION, SOLUTION INTRAVENOUS at 20:29

## 2022-11-20 RX ADMIN — GABAPENTIN 600 MG: 600 TABLET, FILM COATED ORAL at 20:23

## 2022-11-20 RX ADMIN — GABAPENTIN 600 MG: 600 TABLET, FILM COATED ORAL at 08:10

## 2022-11-20 RX ADMIN — Medication 5 MG: at 20:24

## 2022-11-20 RX ADMIN — PANTOPRAZOLE SODIUM 40 MG: 40 TABLET, DELAYED RELEASE ORAL at 05:20

## 2022-11-20 ASSESSMENT — PAIN SCALES - GENERAL
PAINLEVEL_OUTOF10: 8
PAINLEVEL_OUTOF10: 8
PAINLEVEL_OUTOF10: 7
PAINLEVEL_OUTOF10: 6
PAINLEVEL_OUTOF10: 4

## 2022-11-20 ASSESSMENT — PAIN DESCRIPTION - LOCATION
LOCATION: PENIS
LOCATION: PENIS
LOCATION: BACK

## 2022-11-20 ASSESSMENT — PAIN DESCRIPTION - DESCRIPTORS
DESCRIPTORS: ACHING
DESCRIPTORS: BURNING
DESCRIPTORS: BURNING

## 2022-11-20 ASSESSMENT — PAIN DESCRIPTION - ORIENTATION
ORIENTATION: MID
ORIENTATION: MID
ORIENTATION: LOWER

## 2022-11-20 ASSESSMENT — LIFESTYLE VARIABLES
HOW MANY STANDARD DRINKS CONTAINING ALCOHOL DO YOU HAVE ON A TYPICAL DAY: PATIENT DOES NOT DRINK
HOW OFTEN DO YOU HAVE A DRINK CONTAINING ALCOHOL: NEVER

## 2022-11-20 ASSESSMENT — PAIN DESCRIPTION - FREQUENCY
FREQUENCY: INTERMITTENT
FREQUENCY: CONTINUOUS

## 2022-11-20 ASSESSMENT — PAIN DESCRIPTION - PAIN TYPE
TYPE: ACUTE PAIN
TYPE: CHRONIC PAIN

## 2022-11-20 ASSESSMENT — PAIN - FUNCTIONAL ASSESSMENT
PAIN_FUNCTIONAL_ASSESSMENT: PREVENTS OR INTERFERES SOME ACTIVE ACTIVITIES AND ADLS
PAIN_FUNCTIONAL_ASSESSMENT: ACTIVITIES ARE NOT PREVENTED
PAIN_FUNCTIONAL_ASSESSMENT: ACTIVITIES ARE NOT PREVENTED

## 2022-11-20 ASSESSMENT — PAIN DESCRIPTION - ONSET
ONSET: GRADUAL
ONSET: ON-GOING

## 2022-11-20 NOTE — RT PROTOCOL NOTE
RT Inhaler-Nebulizer Bronchodilator Protocol Note    There is a bronchodilator order in the chart from a provider indicating to follow the RT Bronchodilator Protocol and there is an Initiate RT Inhaler-Nebulizer Bronchodilator Protocol order as well (see protocol at bottom of note). CXR Findings:  No results found. The findings from the last RT Protocol Assessment were as follows:   History Pulmonary Disease: Smoker 15 pack years or more  Respiratory Pattern: Regular pattern and RR 12-20 bpm  Breath Sounds: Slightly diminished and/or crackles  Cough: Strong, spontaneous, non-productive  Indication for Bronchodilator Therapy: Decreased or absent breath sounds  Bronchodilator Assessment Score: 3    Aerosolized bronchodilator medication orders have been revised according to the RT Inhaler-Nebulizer Bronchodilator Protocol below. Respiratory Therapist to perform RT Therapy Protocol Assessment initially then follow the protocol. Repeat RT Therapy Protocol Assessment PRN for score 0-3 or on second treatment, BID, and PRN for scores above 3. No Indications - adjust the frequency to every 6 hours PRN wheezing or bronchospasm, if no treatments needed after 48 hours then discontinue using Per Protocol order mode. If indication present, adjust the RT bronchodilator orders based on the Bronchodilator Assessment Score as indicated below. Use Inhaler orders unless patient has one or more of the following: on home nebulizer, not able to hold breath for 10 seconds, is not alert and oriented, cannot activate and use MDI correctly, or respiratory rate 25 breaths per minute or more, then use the equivalent nebulizer order(s) with same Frequency and PRN reasons based on the score. If a patient is on this medication at home then do not decrease Frequency below that used at home.     0-3 - enter or revise RT bronchodilator order(s) to equivalent RT Bronchodilator order with Frequency of every 4 hours PRN for wheezing or increased work of breathing using Per Protocol order mode. 4-6 - enter or revise RT Bronchodilator order(s) to two equivalent RT bronchodilator orders with one order with BID Frequency and one order with Frequency of every 4 hours PRN wheezing or increased work of breathing using Per Protocol order mode. 7-10 - enter or revise RT Bronchodilator order(s) to two equivalent RT bronchodilator orders with one order with TID Frequency and one order with Frequency of every 4 hours PRN wheezing or increased work of breathing using Per Protocol order mode. 11-13 - enter or revise RT Bronchodilator order(s) to one equivalent RT bronchodilator order with QID Frequency and an Albuterol order with Frequency of every 4 hours PRN wheezing or increased work of breathing using Per Protocol order mode. Greater than 13 - enter or revise RT Bronchodilator order(s) to one equivalent RT bronchodilator order with every 4 hours Frequency and an Albuterol order with Frequency of every 2 hours PRN wheezing or increased work of breathing using Per Protocol order mode. RT to enter RT Home Evaluation for COPD & MDI Assessment order using Per Protocol order mode.     Electronically signed by Hortencia Kumar RCP on 11/20/2022 at 5:45 AM

## 2022-11-20 NOTE — PLAN OF CARE
Problem: Discharge Planning  Goal: Discharge to home or other facility with appropriate resources  Outcome: Progressing  Flowsheets (Taken 11/20/2022 0321)  Discharge to home or other facility with appropriate resources:   Identify barriers to discharge with patient and caregiver   Arrange for needed discharge resources and transportation as appropriate   Identify discharge learning needs (meds, wound care, etc)     Problem: Pain  Goal: Verbalizes/displays adequate comfort level or baseline comfort level  Outcome: Progressing     Problem: Skin/Tissue Integrity  Goal: Absence of new skin breakdown  Description: 1. Monitor for areas of redness and/or skin breakdown  2. Assess vascular access sites hourly  3. Every 4-6 hours minimum:  Change oxygen saturation probe site  4. Every 4-6 hours:  If on nasal continuous positive airway pressure, respiratory therapy assess nares and determine need for appliance change or resting period.   Outcome: Progressing     Problem: ABCDS Injury Assessment  Goal: Absence of physical injury  Outcome: Progressing

## 2022-11-20 NOTE — PROGRESS NOTES
4 Eyes Admission Assessment     I agree as the admission nurse that 2 RN's have performed a thorough Head to Toe Skin Assessment on the patient. ALL assessment sites listed below have been assessed on admission. Areas assessed by both nurses:   [x]   Head, Face, and Ears   [x]   Shoulders, Back, and Chest  [x]   Arms, Elbows, and Hands   [x]   Coccyx, Sacrum, and Ischium  [x]   Legs, Feet, and Heels  Multiple stage II buttocks  MASD on coccyx/sacrum   Bruises/tears on the back   Stage 2 bilat pedal  Scattered open wound/redness/tear   Skin graft R femur  Healed surgical wound RLQ  Scattered abrasions   Does the Patient have Skin Breakdown?   Yes a wound was noted on the Admission Assessment and an LDA was Initiated documentation include the Usha-wound, Wound Assessment, Measurements, Dressing Treatment, Drainage, and Color\",         Terrance Prevention initiated:  Yes   Wound Care Orders initiated:  Yes      [id number] nurse consulted for Pressure Injury (Stage 3,4, Unstageable, DTI, NWPT, and Complex wounds) or Terrance score 18 or lower:  Yes      Nurse 1 eSignature: Electronically signed by Xuan Cagle RN on 11/20/22 at 4:41 AM EST    **SHARE this note so that the co-signing nurse is able to place an eSignature**    Nurse 2 eSignature: Electronically signed by Megan Wilcox RN on 11/20/22 at 4:46 AM EST

## 2022-11-20 NOTE — PROGRESS NOTES
Patient was seen by Dr. Juan R Don overnight. He remained afebrile. No complaints today. Continue cefepime. Urine culture pending.

## 2022-11-20 NOTE — H&P
Hospital Medicine History & Physical      PCP: INES BLANDON [address]    Date of Admission: 11/19/2022    Date of Service: Pt seen/examined on 11/20/2022 and Admitted to Inpatient with expected LOS greater than two midnights due to medical therapy. Chief Complaint: Burning with urination      History Of Present Illness:    81 y.o. male wheelchair-bound with history of neurogenic bladder and an indwelling Iglesias with frequent UTIs who presented to [facility] ED with dysuria. He denies any fevers, chills, nausea, vomiting, blood in urine. Emergency room preliminary evaluation showed UA consistent with infection and given his history of multidrug-resistant organisms patient is admitted for IV antibiotics pending culture results. His Iglesias was changed in the emergency room. Past Medical History:          Diagnosis Date    BPH (benign prostatic hyperplasia)     C. difficile colitis 04/11/2022    Carotid stenosis 12/2013    JESU 87-12% stenosis; LICA 21-46% stenosis    Cellulitis 12/2013    LLE    Chronic back pain     Encounter for imaging to screen for metal prior to MRI 05/26/2022    [employer]: Synchromed II pump for baclofen -lfe    GERD (gastroesophageal reflux disease)     History of atrial fibrillation     Hypertension     Lower GI bleed     MDRO (multiple drug resistant organisms) resistance 10/26/2019    urine    Neuromuscular disorder (HCC)     spasticity    Renal insufficiency     Septic arthritis of interphalangeal joint of toe of right foot ([address] 35/81/0363    Systolic congestive heart failure ([address]     Vitamin B12 deficiency        Past Surgical History:          Procedure Laterality Date    BACK SURGERY  2006    lower lumbar    CORONARY ARTERY BYPASS GRAFT  12/13/2013    CABG x 5 (Dr Brett Nj), svg to diag, om1 and 3, distal rca, kelly to lad.      CYSTOSCOPY N/A 1/10/2019    CYSTOSCOPY performed by Kerri Valdes MD at [address] [place] [address] Road Left 5/29/2022    LEFT FOOT PARTIAL FIFTH RAY topically Cleanse skin graft areas with normal saline, pat dry, apply mepilex foam board, change every 3 days as needed. Historical Provider, MD   traZODone (DESYREL) 100 MG tablet Take 100 mg by mouth nightly    Historical Provider, MD   loperamide (IMODIUM) 2 MG capsule Take 2 mg by mouth 4 times daily as needed for Diarrhea    Historical Provider, MD   Melatonin 5 MG CAPS Take 1 capsule by mouth nightly    Historical Provider, MD   levothyroxine (SYNTHROID) 75 MCG tablet Take 1 tablet by mouth Daily 2/3/22   Mora Sims MD   albuterol sulfate HFA (PROVENTIL HFA) 108 (90 Base) MCG/ACT inhaler Inhale 2 puffs into the lungs every 6 hours as needed for Wheezing or Shortness of Breath 1/20/22   Hanny Salmon MD   guaiFENesin (MUCINEX) 600 MG extended release tablet Take 1 tablet by mouth 2 times daily as needed for Congestion 1/20/22   Hanny Salmon MD   Balsam [facility]) OINT ointment Apply topically daily as needed    Historical Provider, MD   gabapentin (NEURONTIN) 600 MG tablet Take 600 mg by mouth 2 times daily. 5/13/21   Historical Provider, MD   dicyclomine (BENTYL) 20 MG tablet Take 20 mg by mouth 3 times daily as needed 12/3/20   Historical Provider, MD   vitamin B-12 (CYANOCOBALAMIN) 1000 MCG tablet Take 1,000 mcg by mouth daily    Historical Provider, MD   pantoprazole (PROTONIX) 40 MG tablet Take 1 tablet by mouth every morning (before breakfast) 5/6/20   Baron Ellie MD   aspirin 81 MG chewable tablet Take 1 tablet by mouth daily 5/7/20 9/6/22  Baron Ellie MD   tamsulosin (FLOMAX) 0.4 MG capsule Take 0.8 mg by mouth daily     Historical Provider, MD   atorvastatin (LIPITOR) 80 MG tablet Take 80 mg by mouth nightly. Historical Provider, MD       Allergies:  Bactrim [sulfamethoxazole-trimethoprim]    Social History:      The patient currently lives in assisted living. TOBACCO:   reports that he quit smoking about 53 years ago.  He has never used smokeless tobacco.  ETOH: reports no history of alcohol use. E-cigarette/Vaping       Questions Responses    E-cigarette/Vaping Use Never User    Start Date     Passive Exposure     Quit Date     Counseling Given     Comments               Family History:             Problem Relation Age of Onset    Heart Disease Father        REVIEW OF SYSTEMS COMPLETED:   Pertinent positives as noted in the HPI. All other systems reviewed and negative. PHYSICAL EXAM PERFORMED:    /64   Pulse 97   Temp 98.9 °F (37.2 °C) (Oral)   Resp 18   Ht 6' (1.829 m)   Wt 201 lb 11.5 oz (91.5 kg)   SpO2 95%   BMI 27.36 kg/m²     General appearance: Frail, elderly, nontoxic-appearing in no apparent distress, appears stated age and cooperative. HEENT:  Normal cephalic, atraumatic without obvious deformity. Pupils equal, round, and reactive to light. Extra ocular muscles intact. Conjunctivae/corneas clear. Neck: Supple, with full range of motion. No jugular venous distention. Trachea midline. Respiratory:  Normal respiratory effort. Clear to auscultation, bilaterally without Rales/Wheezes/Rhonchi. Cardiovascular:  Regular rate and rhythm with normal S1/S2 without murmurs, rubs or gallops. Abdomen: Soft, non-tender, non-distended with normal bowel sounds. Musculoskeletal:  No clubbing, cyanosis or edema bilaterally. Decreased muscle bulk and tone. Skin: Thin, atrophied without petechiae, rashes. He will pressure ulcers bilaterally stage I on the left stage II on the right. Neurologic: AAO x 3, Cranial nerves: II-XII grossly intact, hyperesthesia in the lower extremities, generalized weakness greater in the lower extremities than the upper, no tremors.   Psychiatric: Normal mood and affect, thought content appropriate, decreased insight  Capillary Refill: Brisk,3 seconds, normal  Peripheral Pulses: +2 palpable, equal bilaterally       Labs:     Recent Labs     11/19/22  2302   WBC 6.6   HGB 8.0*   HCT 25.5*        Recent Labs 11/19/22  2302      K 3.7      CO2 28   BUN 26*   CREATININE 0.7*   CALCIUM 8.0*     No results for input(s): AST, ALT, BILIDIR, BILITOT, ALKPHOS in the last 72 hours. No results for input(s): INR in the last 72 hours. No results for input(s): Randene Holes in the last 72 hours. Urinalysis:      Lab Results   Component Value Date/Time    NITRU Negative 11/19/2022 11:30 PM    WBCUA 10-20 11/19/2022 11:30 PM    BACTERIA 3+ 11/19/2022 11:30 PM    RBCUA 11-20 11/19/2022 11:30 PM    BLOODU LARGE 11/19/2022 11:30 PM    SPECGRAV 1.025 11/19/2022 11:30 PM    GLUCOSEU Negative 11/19/2022 11:30 PM       Radiology:     CXR: I have reviewed the CXR with the following interpretation: Not done  EKG:  I have reviewed the EKG with the following interpretation: Not done    No orders to display       Consults:    IP CONSULT TO HOSPITALIST    ASSESSMENT:    Active Hospital Problems    Diagnosis Date Noted    Complicated UTI (urinary tract infection) [N39.0] 01/07/2019   Normocytic anemia                                                                                Secondary diagnoses  Neurogenic bladder  CAD  Hypertension  Hyperlipidemia  GERD    PLAN:  -Empiric cefepime and follow-up cultures  -Check iron studies and B12  -Continue home regimen for chronic stable conditions    DVT Prophylaxis: Lovenox  Diet: ADULT DIET; Regular; 5 carb choices (75 gm/meal); Low Fat/Low Chol/High Fiber/2 gm Na  Code Status: Full Code    PT/OT Eval Status: n/a    Dispo -assisted living       Ranjan Douglas MD    Thank you INES Brooks for the opportunity to be involved in this patient's care. If you have any questions or concerns please feel free to contact me at [phone].

## 2022-11-20 NOTE — ED TRIAGE NOTES
Pt brought in by EMS for concern for UTI. Pt states he has had a schneider in place for over 30 days and has been experiencing pain at the tip of his penis for the past day.  Denies abdominal pain/n/v or fevers

## 2022-11-20 NOTE — ED PROVIDER NOTES
[address] [facility] [address] ENCOUNTER          ATTENDING PHYSICIAN NOTE       Date of evaluation: 11/19/2022      Assessment/ Medical Decion Making     MDM: Misael Hancock is a 81 y.o. male with history of chronic indwelling urinary catheter presenting for evaluation of urinary pain. Catheter exchanged today and UA consistent with UTI from fresh sample. I reviewed his two most recent urine cultures from earlier this year which grew different bacteria with different resistance patterns. Based on my review of these, initiated cefepime. Will admit  for IV abx pending new culture. Jennifer Hurtado MD  7:02 AM    Clinical Impression     1. Complicated UTI (urinary tract infection)        Disposition     DISPOSITION Admitted 11/20/2022 02:25:44 AM        Chief Complaint     Other (Pt brought in by EMS for concern for UTI. Pt states he has had a schneider in place for over 30 days and has been experiencing pain at the tip of his penis for the past day. Denies abdominal pain/n/v or fevers )      History of Present Illness     Misael Hancock is a 81 y.o. male with indwelling urinary catheter since Jan 2019, most recent exchange several weeks ago. Three day ago began having pain at the tip of the penis where the catheter was inserted. No fevers. No abdominal pain. No vomiting. No change in bowel habits. Has had UTIs before and states this feels similar. Allergy to bactrim. Review of Systems     Pertinent positive and negative findings as documented in the HPI. Otherwise a complete ROS was performed and all other systems were reviewed and were negative. Physical Exam     INITIAL VITALS: BP: (!) 140/96, Temp: 99.4 °F (37.4 °C), Heart Rate: 96, Resp: 20, SpO2: 100 %     Nursing note and vitals reviewed. Physical Exam  Constitutional:       General: He is not in acute distress. HENT:      Head: Normocephalic and atraumatic. Nose: No rhinorrhea.    Eyes:      Conjunctiva/sclera: Conjunctivae normal.   Cardiovascular:      Rate and Rhythm: Normal rate. Pulmonary:      Effort: Pulmonary effort is normal. No respiratory distress. Abdominal:      General: There is no distension. Tenderness: There is no abdominal tenderness. Genitourinary:     Comments: Catheter in place with leakage of urine around catheter from urethral meatus. No evidence of scrotal or perineal infection. Musculoskeletal:         General: No deformity. Cervical back: No rigidity. Skin:     General: Skin is warm and dry. Neurological:      Mental Status: He is alert. Mental status is at baseline. Psychiatric:         Thought Content: Thought content normal.       Procedures   Procedures    Past Medical, Surgical, Family, and Social History     He has a past medical history of BPH (benign prostatic hyperplasia), C. difficile colitis, Carotid stenosis, Cellulitis, Chronic back pain, Encounter for imaging to screen for metal prior to MRI, GERD (gastroesophageal reflux disease), History of atrial fibrillation, Hypertension, Lower GI bleed, MDRO (multiple drug resistant organisms) resistance, Neuromuscular disorder ([address] Renal insufficiency, Septic arthritis of interphalangeal joint of toe of right foot ([address] Systolic congestive heart failure ([address] and Vitamin B12 deficiency. 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 His family history includes Heart Disease in his father. He reports that he quit smoking about 53 years ago.  He has never used smokeless tobacco. He reports that he does not drink alcohol and does not use drugs. Nursing Notes, Past Medical Hx, Past Surgical Hx, Social Hx,Allergies, and Family Hx were reviewed. Medications     Current Discharge Medication List        CONTINUE these medications which have NOT CHANGED    Details   acetaminophen (TYLENOL) 325 MG tablet Take 650 mg by mouth every 6 hours as needed for Pain      docusate sodium (COLACE) 100 MG capsule Take 100 mg by mouth every morning (before breakfast)      ferrous sulfate (IRON 325) 325 (65 Fe) MG tablet Take 325 mg by mouth in the morning and at bedtime      miconazole (MICOTIN) 2 % cream Apply topically 2 times daily. Qty: 1 each, Refills: 1      furosemide (LASIX) 40 MG tablet Take 1 tablet by mouth daily  Qty: 60 tablet, Refills: 3      Nutritional Supplements (RONEY PO) Take 1 packet by mouth 2 times daily      Nutritional Supplements (ENSURE HIGH PROTEIN) LIQD Take 1 Can by mouth 2 times daily      Wound Dressings (MEPILEX EX) Apply topically Cleanse skin graft areas with normal saline, pat dry, apply mepilex foam board, change every 3 days as needed. traZODone (DESYREL) 100 MG tablet Take 100 mg by mouth nightly      loperamide (IMODIUM) 2 MG capsule Take 2 mg by mouth 4 times daily as needed for Diarrhea      Melatonin 5 MG CAPS Take 1 capsule by mouth nightly      levothyroxine (SYNTHROID) 75 MCG tablet Take 1 tablet by mouth Daily  Qty: 30 tablet, Refills: 3      albuterol sulfate HFA (PROVENTIL HFA) 108 (90 Base) MCG/ACT inhaler Inhale 2 puffs into the lungs every 6 hours as needed for Wheezing or Shortness of Breath  Qty: 18 g, Refills: 3      guaiFENesin (MUCINEX) 600 MG extended release tablet Take 1 tablet by mouth 2 times daily as needed for Congestion  Qty: 60 tablet, Refills: 2      Balsam [place]-Castor Oil (VENELEX) OINT ointment Apply topically daily as needed      gabapentin (NEURONTIN) 600 MG tablet Take 600 mg by mouth 2 times daily.       dicyclomine (BENTYL) 20 MG tablet Take 20 mg by mouth 3 times daily as needed      vitamin B-12 (CYANOCOBALAMIN) 1000 MCG tablet Take 1,000 mcg by mouth daily      pantoprazole (PROTONIX) 40 MG tablet Take 1 tablet by mouth every morning (before breakfast)  Qty: 30 tablet, Refills: 3      tamsulosin (FLOMAX) 0.4 MG capsule Take 0.4 mg by mouth daily      atorvastatin (LIPITOR) 80 MG tablet Take 80 mg by mouth nightly. Allergies     He is allergic to bactrim [sulfamethoxazole-trimethoprim]. ED Course     Patient was given the following medications:  Orders Placed This Encounter   Medications    DISCONTD: cefTRIAXone (ROCEPHIN) 1,000 mg in dextrose 5 % 50 mL IVPB mini-bag     Order Specific Question:   Antimicrobial Indications     Answer:   Urinary Tract Infection    lidocaine-EPINEPHrine-tetracaine (LET) topical solution 3 mL syringe    FOLLOWED BY Linked Order Group     cefepime (MAXIPIME) 2000 mg IVPB minibag      Order Specific Question:   Antimicrobial Indications      Answer:   Urinary Tract Infection     cefepime (MAXIPIME) 2000 mg IVPB minibag      Order Specific Question:   Antimicrobial Indications      Answer:   Urinary Tract Infection      Order Specific Question:   UTI duration of therapy      Answer:    Other      Order Specific Question:   Other Urinary Tract Infection Duration      Answer:   note noted    DISCONTD: cefepime (MAXIPIME) 2000 mg IVPB minibag     Order Specific Question:   Antimicrobial Indications     Answer:   Urinary Tract Infection    sodium chloride flush 0.9 % injection 5-40 mL    sodium chloride flush 0.9 % injection 5-40 mL    0.9 % sodium chloride infusion    enoxaparin (LOVENOX) injection 40 mg     Order Specific Question:   Indication of Use     Answer:   Prophylaxis-DVT/PE    OR Linked Order Group     ondansetron (ZOFRAN-ODT) disintegrating tablet 4 mg     ondansetron (ZOFRAN) injection 4 mg    OR Linked Order Group     acetaminophen (TYLENOL) tablet 650 mg     acetaminophen (TYLENOL) suppository 650 mg    0.6 K/uL    Basophils Absolute 0.0 0.0 - 0.2 K/uL   Basic Metabolic Panel w/ Reflex to MG   Result Value Ref Range    Sodium 142 136 - 145 mmol/L    Potassium reflex Magnesium 3.7 3.5 - 5.1 mmol/L    Chloride 105 99 - 110 mmol/L    CO2 28 21 - 32 mmol/L    Anion Gap 9 3 - 16    Glucose 110 (H) 70 - 99 mg/dL    BUN 26 (H) 7 - 20 mg/dL    Creatinine 0.7 (L) 0.8 - 1.3 mg/dL    Est, Glom Filt Rate >60 >60    Calcium 8.0 (L) 8.3 - 10.6 mg/dL   Urinalysis with Microscopic   Result Value Ref Range    Color, UA Yellow Straw/Yellow    Clarity, UA Clear Clear    Glucose, Ur Negative Negative mg/dL    Bilirubin Urine Negative Negative    Ketones, Urine Negative Negative mg/dL    Specific Gravity, UA 1.025 1.005 - 1.030    Blood, Urine LARGE (A) Negative    pH, UA 6.0 5.0 - 8.0    Protein,  (A) Negative mg/dL    Urobilinogen, Urine 0.2 <2.0 E.U./dL    Nitrite, Urine Negative Negative    Leukocyte Esterase, Urine MODERATE (A) Negative    Microscopic Examination YES     Urine Type Voided     WBC, UA 10-20 (A) 0 - 5 /HPF    RBC, UA 11-20 (A) 0 - 4 /HPF    Bacteria, UA 3+ (A) None Seen /HPF    Amorphous, UA 2+ /HPF   Lactic Acid   Result Value Ref Range    Lactic Acid 1.5 0.4 - 2.0 mmol/L   Basic Metabolic Panel w/ Reflex to MG   Result Value Ref Range    Sodium 142 136 - 145 mmol/L    Potassium reflex Magnesium 3.4 (L) 3.5 - 5.1 mmol/L    Chloride 107 99 - 110 mmol/L    CO2 27 21 - 32 mmol/L    Anion Gap 8 3 - 16    Glucose 106 (H) 70 - 99 mg/dL    BUN 33 (H) 7 - 20 mg/dL    Creatinine 1.0 0.8 - 1.3 mg/dL    Est, Glom Filt Rate >60 >60    Calcium 8.3 8.3 - 10.6 mg/dL   CBC with Auto Differential   Result Value Ref Range    WBC 6.3 4.0 - 11.0 K/uL    RBC 2.60 (L) 4.20 - 5.90 M/uL    Hemoglobin 7.1 (L) 13.5 - 17.5 g/dL    Hematocrit 22.3 (L) 40.5 - 52.5 %    MCV 86.1 80.0 - 100.0 fL    MCH 27.3 26.0 - 34.0 pg    MCHC 31.7 31.0 - 36.0 g/dL    RDW 18.5 (H) 12.4 - 15.4 %    Platelets 606 855 - 615 K/uL    MPV 8.2 5.0 - 10.5 fL Neutrophils % 59.5 %    Lymphocytes % 24.4 %    Monocytes % 10.6 %    Eosinophils % 4.7 %    Basophils % 0.8 %    Neutrophils Absolute 3.8 1.7 - 7.7 K/uL    Lymphocytes Absolute 1.5 1.0 - 5.1 K/uL    Monocytes Absolute 0.7 0.0 - 1.3 K/uL    Eosinophils Absolute 0.3 0.0 - 0.6 K/uL    Basophils Absolute 0.1 0.0 - 0.2 K/uL   Magnesium   Result Value Ref Range    Magnesium 2.00 1.80 - 2.40 mg/dL   Hemoglobin and Hematocrit   Result Value Ref Range    Hemoglobin 7.5 (L) 13.5 - 17.5 g/dL    Hematocrit 24.4 (L) 40.5 - 52.5 %   CBC with Auto Differential   Result Value Ref Range    WBC 8.2 4.0 - 11.0 K/uL    RBC 2.81 (L) 4.20 - 5.90 M/uL    Hemoglobin 7.6 (L) 13.5 - 17.5 g/dL    Hematocrit 24.6 (L) 40.5 - 52.5 %    MCV 87.4 80.0 - 100.0 fL    MCH 27.1 26.0 - 34.0 pg    MCHC 31.1 31.0 - 36.0 g/dL    RDW 18.2 (H) 12.4 - 15.4 %    Platelets 743 927 - 237 K/uL    MPV 8.0 5.0 - 10.5 fL    Neutrophils % 64.7 %    Lymphocytes % 18.3 %    Monocytes % 9.4 %    Eosinophils % 6.6 %    Basophils % 1.0 %    Neutrophils Absolute 5.3 1.7 - 7.7 K/uL    Lymphocytes Absolute 1.5 1.0 - 5.1 K/uL    Monocytes Absolute 0.8 0.0 - 1.3 K/uL    Eosinophils Absolute 0.5 0.0 - 0.6 K/uL    Basophils Absolute 0.1 0.0 - 0.2 K/uL   Basic Metabolic Panel   Result Value Ref Range    Sodium 145 136 - 145 mmol/L    Potassium 4.3 3.5 - 5.1 mmol/L    Chloride 108 99 - 110 mmol/L    CO2 29 21 - 32 mmol/L    Anion Gap 8 3 - 16    Glucose 114 (H) 70 - 99 mg/dL    BUN 33 (H) 7 - 20 mg/dL    Creatinine 1.1 0.8 - 1.3 mg/dL    Est, Glom Filt Rate >60 >60    Calcium 8.6 8.3 - 10.6 mg/dL   EKG 12 Lead   Result Value Ref Range    Ventricular Rate 65 BPM    Atrial Rate 59 BPM    QRS Duration 94 ms    Q-T Interval 438 ms    QTc Calculation (Bazett) 455 ms    R Axis 30 degrees    T Axis 41 degrees    Diagnosis       Atrial fibrillation with a competing junctional pacemakerIncomplete right bundle branch blockAbnormal ECGLate transitionConfirmed by [facility] JESSIE CARREON, Maren Jarrell ([phone]) on 11/21/2022 10:00:27 AM       CONSULTS:  IP CONSULT TO HOSPITALIST    PATIENT REFERRED TO:  No follow-up provider specified.     DISCHARGE MEDICATIONS:  Current Discharge Medication List             Giovanna Rodríguez MD  11/22/22 0791

## 2022-11-20 NOTE — PROGRESS NOTES
Pharmacy Note - Extended Infusion Beta-Lactam Adjustment    Cefepime ordered for treatment of UTI. Per [facility] Extended Infusion Beta-Lactam Policy, Cefepime will be changed to 2g Q12hr EI. Estimated Creatinine Clearance: Estimated Creatinine Clearance: 91 mL/min (A) (based on SCr of 0.7 mg/dL (L)). Dialysis Status, ALIS, CKD: None  BMI: Body mass index is 27.36 kg/m². Rationale for Adjustment: Agent is renally eliminated and demonstrates time-dependent effect on bacterial eradication. Extended-infusion dosing strategy aims to enhance microbiologic and clinical efficacy. Pharmacy will continue to monitor renal function, cultures and sensitivities (where available) and adjust dose as necessary. Please call with any questions.     Velia Obrien, PharmD  Main Pharmacy: [id number]  11/20/2022 1:56 PM

## 2022-11-21 LAB
ANION GAP SERPL CALCULATED.3IONS-SCNC: 8 MMOL/L (ref 3–16)
BASOPHILS ABSOLUTE: 0.1 K/UL (ref 0–0.2)
BASOPHILS RELATIVE PERCENT: 0.8 %
BUN BLDV-MCNC: 33 MG/DL (ref 7–20)
CALCIUM SERPL-MCNC: 8.3 MG/DL (ref 8.3–10.6)
CHLORIDE BLD-SCNC: 107 MMOL/L (ref 99–110)
CO2: 27 MMOL/L (ref 21–32)
CREAT SERPL-MCNC: 1 MG/DL (ref 0.8–1.3)
EKG ATRIAL RATE: 59 BPM
EKG DIAGNOSIS: NORMAL
EKG Q-T INTERVAL: 438 MS
EKG QRS DURATION: 94 MS
EKG QTC CALCULATION (BAZETT): 455 MS
EKG R AXIS: 30 DEGREES
EKG T AXIS: 41 DEGREES
EKG VENTRICULAR RATE: 65 BPM
EOSINOPHILS ABSOLUTE: 0.3 K/UL (ref 0–0.6)
EOSINOPHILS RELATIVE PERCENT: 4.7 %
GFR SERPL CREATININE-BSD FRML MDRD: >60 ML/MIN/{1.73_M2}
GLUCOSE BLD-MCNC: 106 MG/DL (ref 70–99)
HCT VFR BLD CALC: 22.3 % (ref 40.5–52.5)
HCT VFR BLD CALC: 24.4 % (ref 40.5–52.5)
HEMOGLOBIN: 7.1 G/DL (ref 13.5–17.5)
HEMOGLOBIN: 7.5 G/DL (ref 13.5–17.5)
LYMPHOCYTES ABSOLUTE: 1.5 K/UL (ref 1–5.1)
LYMPHOCYTES RELATIVE PERCENT: 24.4 %
MAGNESIUM: 2 MG/DL (ref 1.8–2.4)
MCH RBC QN AUTO: 27.3 PG (ref 26–34)
MCHC RBC AUTO-ENTMCNC: 31.7 G/DL (ref 31–36)
MCV RBC AUTO: 86.1 FL (ref 80–100)
MONOCYTES ABSOLUTE: 0.7 K/UL (ref 0–1.3)
MONOCYTES RELATIVE PERCENT: 10.6 %
NEUTROPHILS ABSOLUTE: 3.8 K/UL (ref 1.7–7.7)
NEUTROPHILS RELATIVE PERCENT: 59.5 %
PDW BLD-RTO: 18.5 % (ref 12.4–15.4)
PLATELET # BLD: 269 K/UL (ref 135–450)
PMV BLD AUTO: 8.2 FL (ref 5–10.5)
POTASSIUM REFLEX MAGNESIUM: 3.4 MMOL/L (ref 3.5–5.1)
RBC # BLD: 2.6 M/UL (ref 4.2–5.9)
SODIUM BLD-SCNC: 142 MMOL/L (ref 136–145)
WBC # BLD: 6.3 K/UL (ref 4–11)

## 2022-11-21 PROCEDURE — 93010 ELECTROCARDIOGRAM REPORT: CPT | Performed by: INTERNAL MEDICINE

## 2022-11-21 PROCEDURE — 83735 ASSAY OF MAGNESIUM: CPT

## 2022-11-21 PROCEDURE — 6360000002 HC RX W HCPCS: Performed by: INTERNAL MEDICINE

## 2022-11-21 PROCEDURE — 2580000003 HC RX 258: Performed by: INTERNAL MEDICINE

## 2022-11-21 PROCEDURE — 2500000003 HC RX 250 WO HCPCS: Performed by: HOSPITALIST

## 2022-11-21 PROCEDURE — 85014 HEMATOCRIT: CPT

## 2022-11-21 PROCEDURE — 6360000002 HC RX W HCPCS: Performed by: EMERGENCY MEDICINE

## 2022-11-21 PROCEDURE — 93005 ELECTROCARDIOGRAM TRACING: CPT | Performed by: INTERNAL MEDICINE

## 2022-11-21 PROCEDURE — 1200000000 HC SEMI PRIVATE

## 2022-11-21 PROCEDURE — 2580000003 HC RX 258: Performed by: EMERGENCY MEDICINE

## 2022-11-21 PROCEDURE — 6370000000 HC RX 637 (ALT 250 FOR IP): Performed by: INTERNAL MEDICINE

## 2022-11-21 PROCEDURE — 80048 BASIC METABOLIC PNL TOTAL CA: CPT

## 2022-11-21 PROCEDURE — 85025 COMPLETE CBC W/AUTO DIFF WBC: CPT

## 2022-11-21 PROCEDURE — 36415 COLL VENOUS BLD VENIPUNCTURE: CPT

## 2022-11-21 PROCEDURE — 85018 HEMOGLOBIN: CPT

## 2022-11-21 RX ADMIN — TRAZODONE HYDROCHLORIDE 100 MG: 100 TABLET ORAL at 20:46

## 2022-11-21 RX ADMIN — FERROUS SULFATE TAB 325 MG (65 MG ELEMENTAL FE) 325 MG: 325 (65 FE) TAB at 20:46

## 2022-11-21 RX ADMIN — Medication: at 17:39

## 2022-11-21 RX ADMIN — TAMSULOSIN HYDROCHLORIDE 0.4 MG: 0.4 CAPSULE ORAL at 08:11

## 2022-11-21 RX ADMIN — SODIUM CHLORIDE: 9 INJECTION, SOLUTION INTRAVENOUS at 20:54

## 2022-11-21 RX ADMIN — GABAPENTIN 600 MG: 600 TABLET, FILM COATED ORAL at 20:46

## 2022-11-21 RX ADMIN — SODIUM CHLORIDE, PRESERVATIVE FREE 10 ML: 5 INJECTION INTRAVENOUS at 20:52

## 2022-11-21 RX ADMIN — CEFEPIME HYDROCHLORIDE 2000 MG: 2 INJECTION, POWDER, FOR SOLUTION INTRAMUSCULAR; INTRAVENOUS at 20:55

## 2022-11-21 RX ADMIN — SODIUM CHLORIDE, PRESERVATIVE FREE 10 ML: 5 INJECTION INTRAVENOUS at 08:11

## 2022-11-21 RX ADMIN — DOCUSATE SODIUM 100 MG: 100 CAPSULE, LIQUID FILLED ORAL at 05:51

## 2022-11-21 RX ADMIN — FUROSEMIDE 40 MG: 40 TABLET ORAL at 08:11

## 2022-11-21 RX ADMIN — ENOXAPARIN SODIUM 40 MG: 100 INJECTION SUBCUTANEOUS at 17:41

## 2022-11-21 RX ADMIN — PANTOPRAZOLE SODIUM 40 MG: 40 TABLET, DELAYED RELEASE ORAL at 05:51

## 2022-11-21 RX ADMIN — Medication 5 MG: at 20:46

## 2022-11-21 RX ADMIN — Medication: at 20:51

## 2022-11-21 RX ADMIN — LEVOTHYROXINE SODIUM 75 MCG: 0.07 TABLET ORAL at 05:51

## 2022-11-21 RX ADMIN — CEFEPIME HYDROCHLORIDE 2000 MG: 2 INJECTION, POWDER, FOR SOLUTION INTRAMUSCULAR; INTRAVENOUS at 08:20

## 2022-11-21 RX ADMIN — SODIUM CHLORIDE 30 ML: 9 INJECTION, SOLUTION INTRAVENOUS at 08:17

## 2022-11-21 RX ADMIN — GABAPENTIN 600 MG: 600 TABLET, FILM COATED ORAL at 08:10

## 2022-11-21 RX ADMIN — FERROUS SULFATE TAB 325 MG (65 MG ELEMENTAL FE) 325 MG: 325 (65 FE) TAB at 08:11

## 2022-11-21 RX ADMIN — ATORVASTATIN CALCIUM 80 MG: 40 TABLET, FILM COATED ORAL at 20:46

## 2022-11-21 RX ADMIN — Medication: at 08:12

## 2022-11-21 RX ADMIN — CYANOCOBALAMIN TAB 1000 MCG 1000 MCG: 1000 TAB at 08:11

## 2022-11-21 NOTE — CARE COORDINATION
Case Management Assessment  Initial Evaluation    Date/Time of Evaluation: 11/21/2022 4:30 PM  Assessment Completed by: Abdiaziz Worley RN    If patient is discharged prior to next notation, then this note serves as note for discharge by case management. Patient Name: Larene Peabody                   YOB: 1940  Diagnosis: Complicated UTI (urinary tract infection) [N39.0]                   Date / Time: 11/19/2022  9:46 PM    Patient Admission Status: Inpatient   Readmission Risk (Low < 19, Mod (19-27), High > 27): Readmission Risk Score: 34.6    Current PCP: INES BLANDON [address]  PCP verified by CM? Chart Reviewed: Yes      History Provided by:    Patient Orientation:      Patient Cognition:      Hospitalization in the last 30 days (Readmission):  No    If yes, Readmission Assessment in CM Navigator will be completed. Advance Directives:      Code Status: Full Code   Patient's Primary Decision Maker is:      Primary Decision Maker: Mary Wallace - Child - [phone]    Primary Decision Maker: Eugeniojuana Barrientos Child - [phone]    Discharge Planning:    Patient lives with: Other (Comment) (nursing home) Type of Home: [place]  Primary Care Giver:    Patient Support Systems include: Children, Family Members   Current Financial resources:    Current community resources:    Current services prior to admission: None            Current DME:              Type of Home Care services:  None    ADLS  Prior functional level:    Current functional level:      PT AM-PAC:   /24  OT AM-PAC:   /24    Family can provide assistance at DC: Would you like Case Management to discuss the discharge plan with any other family members/significant others, and if so, who?     Plans to Return to Present Housing:    Other Identified Issues/Barriers to RETURNING to current housing: none  Potential Assistance needed at discharge: [address]            Potential DME:    Patient expects to discharge to: Long-term care  Plan for transportation at discharge:      Financial    Payor: Zion Batista / Plan: [address] / Product Type: *No Product type* /     Does insurance require precert for SNF: Yes    Potential assistance Purchasing Medications: No  Meds-to-Beds request:        [address] Mail Mary Baez [phone] - F [phone]  [address] [facility] [address]  Phone: [phone] Fax: [phone]    [facility] - [address] [address]  Phone: [phone] Fax: [phone]      Notes:    Factors facilitating achievement of predicted outcomes: Cooperative and Home is wheelchair accessible    Barriers to discharge: Decreased endurance    Additional Case Management Notes:   CM spoke with patient at bedside. He is from [facility]. He states he has not been getting out of bed much, usually takes a raymundo lift to get him up. Patient states his son Kevin is in charge of his affairs. Patient will need transport at discharge. The Plan for Transition of Care is related to the following treatment goals of Complicated UTI (urinary tract infection) [A19.6]    IF APPLICABLE: The Patient and/or patient representative Sadi Collins and his family were provided with a choice of provider and agrees with the discharge plan. Freedom of choice list with basic dialogue that supports the patient's individualized plan of care/goals and shares the quality data associated with the providers was provided to:     Patient Representative Name:       The Patient and/or Patient Representative Agree with the Discharge Plan?       Karly Ruano RN  Case Management Department  Ph: [phone] Fax: [phone]

## 2022-11-21 NOTE — PROGRESS NOTES
Comprehensive Nutrition Assessment    RECOMMENDATIONS:  PO Diet: Continue 5 carb, low fat/lowchol/highfiber/2 g Na diet  ONS: Add proteinex BID  Nutrition Education: No recommendation at this time     NUTRITION ASSESSMENT:   Nutritional summary & status: Pt has stg 2 pressure injury. Pts weight has fluctuated over last 6 months but no significant wt loss. Pt was sleeping at time of visit. Documentation shows multiple 0% intakes with one % intake at dinner last night. Per [place] review, meal likely does not meet protein needs. Pt overall is not meeting protein needs for wound healing. Will send proteinex BID to help meet protein needs. Admission/PMH: Hx of neurogenic bladder and an indwelling Iglesias with frequent UTIs. Admitted with dysuria    MALNUTRITION ASSESSMENT      Malnutrition Status: No malnutrition    NUTRITION DIAGNOSIS   Inadequate protein intake related to increase demand for energy/nutrients as evidenced by wounds    Nutrition Monitoring and Evaluation:   Food/Nutrient Intake Outcomes:  Food and Nutrient Intake, Supplement Intake  Physical Signs/Symptoms Outcomes:  Biochemical Data, Nutrition Focused Physical Findings, Weight     OBJECTIVE DATA: Significant to nutrition assessment  Nutrition Related Findings: 11/21 BM, perineal edema, glu 106  Wounds: Stage II, Pressure Injury  Nutrition Goals: Meet at least 75% of estimated needs, by next RD assessment     CURRENT NUTRITION THERAPIES  ADULT DIET; Regular; 5 carb choices (75 gm/meal);  Low Fat/Low Chol/High Fiber/2 gm Na  PO Intake: 51-75%   PO Supplement Intake:None Ordered  Additional Sources of Calories/IVF:      ANTHROPOMETRICS  Current Height: 6' (182.9 cm)  Current Weight: 197 lb 1.5 oz (89.4 kg)    Admission weight: 217 lb 3.2 oz (98.5 kg)  Ideal Body Weight (IBW): 178 lbs  (81 kg)    Usual Bodyweight     BMI: 26.8    COMPARATIVE STANDARDS  Energy (kcal):  1788 - 2235 (20-25kcals/kg)     Protein (g):  113 (1.4 g/kg IBW)       Fluid (mL/day): 0587 - 0785 (1ml/kcal) or per provider    The patient will be monitored per nutrition standards of care. Consult dietitian if additional nutrition interventions are needed prior to RD reassessment.      Washington Molina, [address] [place] [address]:  [phone]  Office:  [phone]

## 2022-11-21 NOTE — PROGRESS NOTES
PT/OT   OT/PT orders received, chart reviewed. Pt lives at [facility]. Per Rn at [facility], pt is non-ambulatory at baseline, raymundo lift is used to transfer patient. He is dependent with all ADLs in bed. Pt is not appropriate for acute OT/PT evaluations or treatment. Anticipate pt will return to prior setting with prior care at discharge. Will sign off. RN informed.     Mala Mejia, OTR/L #[id number]  Irlanda Tafoya, PT, DPT

## 2022-11-21 NOTE — PLAN OF CARE
Pt having maroon-gaurav red drainage coming out from his penis. MD notified. Pt vss. Afebrile. Spo2 > 90s. Pt standard safety measures in place. No new skin breakdown noted. Problem: Discharge Planning  Goal: Discharge to home or other facility with appropriate resources  Outcome: Progressing  Flowsheets (Taken 11/20/2022 2020)  Discharge to home or other facility with appropriate resources:   Identify barriers to discharge with patient and caregiver   Arrange for needed discharge resources and transportation as appropriate   Identify discharge learning needs (meds, wound care, etc)     Problem: Pain  Goal: Verbalizes/displays adequate comfort level or baseline comfort level  11/21/2022 0446 by Geeta Fragoso RN  Outcome: Progressing     Problem: Skin/Tissue Integrity  Goal: Absence of new skin breakdown  Description: 1. Monitor for areas of redness and/or skin breakdown  2. Assess vascular access sites hourly  3. Every 4-6 hours minimum:  Change oxygen saturation probe site  4. Every 4-6 hours:  If on nasal continuous positive airway pressure, respiratory therapy assess nares and determine need for appliance change or resting period. 11/21/2022 0446 by Geeta Fragoso RN  Outcome: Progressing     Problem: Skin/Tissue Integrity  Goal: Absence of new skin breakdown  Description: 1. Monitor for areas of redness and/or skin breakdown  2. Assess vascular access sites hourly  3. Every 4-6 hours minimum:  Change oxygen saturation probe site  4. Every 4-6 hours:  If on nasal continuous positive airway pressure, respiratory therapy assess nares and determine need for appliance change or resting period.   11/21/2022 0446 by Geeta Fragoso RN  Outcome: Progressing     Problem: ABCDS Injury Assessment  Goal: Absence of physical injury  11/21/2022 0446 by Geeta Fragoso RN  Outcome: Progressing  Flowsheets (Taken 11/20/2022 2020)  Absence of Physical Injury: Implement safety measures based on patient assessment

## 2022-11-21 NOTE — PROGRESS NOTES
Hospitalist Progress Note      PCP: INES BLANDON [address]    Date of Admission: 11/19/2022    Chief Complaint:     Hospital Course:   81 y.o. male wheelchair-bound with history of neurogenic bladder and an indwelling Iglesias with frequent UTIs who presented to [facility] ED with dysuria. He denies any fevers, chills, nausea, vomiting, blood in urine. Emergency room preliminary evaluation showed UA consistent with infection and given his history of multidrug-resistant organisms patient is admitted for IV antibiotics pending culture results. His Igelsias was changed in the emergency room. Subjective:     Feels better today with no complaints.   No abdominal pain , fever or chills      Medications:  Reviewed    Infusion Medications    sodium chloride 25 mL (11/20/22 2029)     Scheduled Medications    sodium chloride flush  5-40 mL IntraVENous 2 times per day    enoxaparin  40 mg SubCUTAneous Daily    atorvastatin  80 mg Oral Nightly    docusate sodium  100 mg Oral QAM AC    ferrous sulfate  325 mg Oral BID    furosemide  40 mg Oral Daily    gabapentin  600 mg Oral BID    levothyroxine  75 mcg Oral Daily    melatonin  5 mg Oral Nightly    pantoprazole  40 mg Oral QAM AC    tamsulosin  0.4 mg Oral Daily    traZODone  100 mg Oral Nightly    vitamin B-12  1,000 mcg Oral Daily    Venelex   Topical BID    cefepime  2,000 mg IntraVENous Q12H     PRN Meds: sodium chloride flush, sodium chloride, ondansetron **OR** ondansetron, acetaminophen **OR** acetaminophen, polyethylene glycol, albuterol, dicyclomine, lidocaine      Intake/Output Summary (Last 24 hours) at 11/21/2022 0807  Last data filed at 11/21/2022 0335  Gross per 24 hour   Intake 590 ml   Output 750 ml   Net -160 ml       Physical Exam Performed:    BP (!) 119/53   Pulse (!) 49   Temp 97.6 °F (36.4 °C)   Resp 18   Ht 6' (1.829 m)   Wt 197 lb 1.5 oz (89.4 kg)   SpO2 94%   BMI 26.73 kg/m²     General appearance: No apparent distress, appears stated age and cooperative. HEENT: Pupils equal, round, and reactive to light. Conjunctivae/corneas clear. Neck: Supple, with full range of motion. No jugular venous distention. Trachea midline. Respiratory:  Normal respiratory effort. Clear to auscultation, bilaterally without Rales/Wheezes/Rhonchi. Cardiovascular: Regular rate and rhythm with normal S1/S2 without murmurs, rubs or gallops. Abdomen: Soft, non-tender, non-distended with normal bowel sounds. Musculoskeletal: No clubbing, cyanosis or edema bilaterally. Full range of motion without deformity. Skin: Skin color, texture, turgor normal.  No rashes or lesions. Neurologic:  Neurovascularly intact without any focal sensory/motor deficits. Cranial nerves: II-XII intact, grossly non-focal.  Psychiatric: Alert and oriented, thought content appropriate, normal insight  Capillary Refill: Brisk, 3 seconds, normal   Peripheral Pulses: +2 palpable, equal bilaterally       Labs:   Recent Labs     11/19/22 2302 11/21/22  0631   WBC 6.6 6.3   HGB 8.0* 7.1*   HCT 25.5* 22.3*    269     Recent Labs     11/19/22 2302 11/21/22  0631    142   K 3.7 3.4*    107   CO2 28 27   BUN 26* 33*   CREATININE 0.7* 1.0   CALCIUM 8.0* 8.3     No results for input(s): AST, ALT, BILIDIR, BILITOT, ALKPHOS in the last 72 hours. No results for input(s): INR in the last 72 hours. No results for input(s): [place] in the last 72 hours.     Urinalysis:      Lab Results   Component Value Date/Time    NITRU Negative 11/19/2022 11:30 PM    WBCUA 10-20 11/19/2022 11:30 PM    BACTERIA 3+ 11/19/2022 11:30 PM    RBCUA 11-20 11/19/2022 11:30 PM    BLOODU LARGE 11/19/2022 11:30 PM    SPECGRAV 1.025 11/19/2022 11:30 PM    GLUCOSEU Negative 11/19/2022 11:30 PM       Radiology:  No orders to display       IP CONSULT TO HOSPITALIST    Assessment/Plan:       -complicated UTI associated with chronic indwelling Iglesias--patient has a history of recurrent UTIs with MDR organisms Follow-up with urine culture, continue IV Rocephin    -Neurogenic bladder with chronic indwelling Iglesias-continue Iglesias care-change in the ED    -Acute on chronic anemia--has known iron deficiency--no obvious blood loss--continue iron supplementation, monitor H&H--to follow outpatient if stable    -Essential hypertension  -Hyperlipidemia-continue statin  -GERD-continue PPI  -Hypothyroidism-continue Synthroid  -None ambulatory/wheelchair-bound--resides at LTC facility    DVT Prophylaxis: Lovenox  Diet: ADULT DIET; Regular; 5 carb choices (75 gm/meal); Low Fat/Low Chol/High Fiber/2 gm Na  Code Status: Full Code  PT/OT Eval Status:     Disposition. .  Back to LTC within 24 hours pending urine cultures-has history of MDROs      Demian Ulloa MD

## 2022-11-21 NOTE — PLAN OF CARE
Problem: Safety - Adult  Goal: Free from fall injury  Outcome: Progressing  Flowsheets (Taken 11/21/2022 1650)  Free From Fall Injury: Instruct family/caregiver on patient safety  Note: Discussed falls precautions and interventions used to promote patient safety      Problem: Skin/Tissue Integrity  Goal: Absence of new skin breakdown  Description: 1. Monitor for areas of redness and/or skin breakdown  2. Assess vascular access sites hourly  3. Every 4-6 hours minimum:  Change oxygen saturation probe site  4. Every 4-6 hours:  If on nasal continuous positive airway pressure, respiratory therapy assess nares and determine need for appliance change or resting period.   11/21/2022 1650 by Desire Watts RN  Outcome: Progressing  Note: Assisting with turning every 2 hours, dressing changes, and specialty bed in use   11/21/2022 0446 by Shawna Souza RN  Outcome: Progressing     Problem: Pain  Goal: Verbalizes/displays adequate comfort level or baseline comfort level  11/21/2022 1650 by Desire Watts RN  Outcome: Progressing  Flowsheets  Taken 11/21/2022 1650  Verbalizes/displays adequate comfort level or baseline comfort level:   Encourage patient to monitor pain and request assistance   Administer analgesics based on type and severity of pain and evaluate response   Assess pain using appropriate pain scale   Implement non-pharmacological measures as appropriate and evaluate response   Notify Licensed Independent Practitioner if interventions unsuccessful or patient reports new pain  Taken 11/21/2022 1119  Verbalizes/displays adequate comfort level or baseline comfort level: Encourage patient to monitor pain and request assistance  Taken 11/21/2022 0807  Verbalizes/displays adequate comfort level or baseline comfort level: Encourage patient to monitor pain and request assistance  Note: Discussed pharmacological and non-pharmacological interventions to assist with pain   11/21/2022 0446 by Shawna Souza RN  Outcome: Progressing

## 2022-11-22 VITALS
HEIGHT: 72 IN | RESPIRATION RATE: 20 BRPM | TEMPERATURE: 98 F | OXYGEN SATURATION: 94 % | WEIGHT: 197.53 LBS | DIASTOLIC BLOOD PRESSURE: 80 MMHG | BODY MASS INDEX: 26.76 KG/M2 | HEART RATE: 80 BPM | SYSTOLIC BLOOD PRESSURE: 140 MMHG

## 2022-11-22 LAB
ANION GAP SERPL CALCULATED.3IONS-SCNC: 8 MMOL/L (ref 3–16)
BASOPHILS ABSOLUTE: 0.1 K/UL (ref 0–0.2)
BASOPHILS RELATIVE PERCENT: 1 %
BUN BLDV-MCNC: 33 MG/DL (ref 7–20)
CALCIUM SERPL-MCNC: 8.6 MG/DL (ref 8.3–10.6)
CHLORIDE BLD-SCNC: 108 MMOL/L (ref 99–110)
CO2: 29 MMOL/L (ref 21–32)
CREAT SERPL-MCNC: 1.1 MG/DL (ref 0.8–1.3)
EOSINOPHILS ABSOLUTE: 0.5 K/UL (ref 0–0.6)
EOSINOPHILS RELATIVE PERCENT: 6.6 %
GFR SERPL CREATININE-BSD FRML MDRD: >60 ML/MIN/{1.73_M2}
GLUCOSE BLD-MCNC: 114 MG/DL (ref 70–99)
HCT VFR BLD CALC: 24.6 % (ref 40.5–52.5)
HEMOGLOBIN: 7.6 G/DL (ref 13.5–17.5)
LYMPHOCYTES ABSOLUTE: 1.5 K/UL (ref 1–5.1)
LYMPHOCYTES RELATIVE PERCENT: 18.3 %
MCH RBC QN AUTO: 27.1 PG (ref 26–34)
MCHC RBC AUTO-ENTMCNC: 31.1 G/DL (ref 31–36)
MCV RBC AUTO: 87.4 FL (ref 80–100)
MONOCYTES ABSOLUTE: 0.8 K/UL (ref 0–1.3)
MONOCYTES RELATIVE PERCENT: 9.4 %
NEUTROPHILS ABSOLUTE: 5.3 K/UL (ref 1.7–7.7)
NEUTROPHILS RELATIVE PERCENT: 64.7 %
PDW BLD-RTO: 18.2 % (ref 12.4–15.4)
PLATELET # BLD: 298 K/UL (ref 135–450)
PMV BLD AUTO: 8 FL (ref 5–10.5)
POTASSIUM SERPL-SCNC: 4.3 MMOL/L (ref 3.5–5.1)
RBC # BLD: 2.81 M/UL (ref 4.2–5.9)
SODIUM BLD-SCNC: 145 MMOL/L (ref 136–145)
WBC # BLD: 8.2 K/UL (ref 4–11)

## 2022-11-22 PROCEDURE — 2580000003 HC RX 258: Performed by: EMERGENCY MEDICINE

## 2022-11-22 PROCEDURE — 6360000002 HC RX W HCPCS: Performed by: EMERGENCY MEDICINE

## 2022-11-22 PROCEDURE — 2580000003 HC RX 258: Performed by: INTERNAL MEDICINE

## 2022-11-22 PROCEDURE — 80048 BASIC METABOLIC PNL TOTAL CA: CPT

## 2022-11-22 PROCEDURE — 6360000002 HC RX W HCPCS: Performed by: INTERNAL MEDICINE

## 2022-11-22 PROCEDURE — 85025 COMPLETE CBC W/AUTO DIFF WBC: CPT

## 2022-11-22 PROCEDURE — 6370000000 HC RX 637 (ALT 250 FOR IP): Performed by: INTERNAL MEDICINE

## 2022-11-22 PROCEDURE — 36415 COLL VENOUS BLD VENIPUNCTURE: CPT

## 2022-11-22 RX ADMIN — CEFEPIME HYDROCHLORIDE 2000 MG: 2 INJECTION, POWDER, FOR SOLUTION INTRAMUSCULAR; INTRAVENOUS at 08:49

## 2022-11-22 RX ADMIN — Medication: at 08:48

## 2022-11-22 RX ADMIN — FERROUS SULFATE TAB 325 MG (65 MG ELEMENTAL FE) 325 MG: 325 (65 FE) TAB at 08:49

## 2022-11-22 RX ADMIN — SODIUM CHLORIDE, PRESERVATIVE FREE 10 ML: 5 INJECTION INTRAVENOUS at 08:48

## 2022-11-22 RX ADMIN — GABAPENTIN 600 MG: 600 TABLET, FILM COATED ORAL at 08:48

## 2022-11-22 RX ADMIN — TAMSULOSIN HYDROCHLORIDE 0.4 MG: 0.4 CAPSULE ORAL at 08:48

## 2022-11-22 RX ADMIN — FUROSEMIDE 40 MG: 40 TABLET ORAL at 08:48

## 2022-11-22 RX ADMIN — Medication: at 08:55

## 2022-11-22 RX ADMIN — ENOXAPARIN SODIUM 40 MG: 100 INJECTION SUBCUTANEOUS at 08:48

## 2022-11-22 RX ADMIN — PANTOPRAZOLE SODIUM 40 MG: 40 TABLET, DELAYED RELEASE ORAL at 06:17

## 2022-11-22 RX ADMIN — DOCUSATE SODIUM 100 MG: 100 CAPSULE, LIQUID FILLED ORAL at 06:16

## 2022-11-22 RX ADMIN — CYANOCOBALAMIN TAB 1000 MCG 1000 MCG: 1000 TAB at 08:48

## 2022-11-22 RX ADMIN — LEVOTHYROXINE SODIUM 75 MCG: 0.07 TABLET ORAL at 06:17

## 2022-11-22 NOTE — DISCHARGE SUMMARY
Hospital Discharge Summary    Patient's PCP: INES Mcduffie Iglesias Date: 11/19/2022   Discharge Date: 11/22/2022    Admitting Physician: Dr. Jyothi Rebollar MD  Discharge Physician: Dr. Sukhi Knight MD   Consults: none    Brief HPI:     81 y.o. male wheelchair-bound with history of neurogenic bladder and an indwelling Iglesias with frequent UTIs who presented to [facility] ED with dysuria. He denies any fevers, chills, nausea, vomiting, blood in urine. Emergency room preliminary evaluation showed UA consistent with infection and given his history of multidrug-resistant organisms patient is admitted for IV antibiotics pending culture results. His Iglesias was changed in the emergency room    Brief hospital course:    -presumed complicated UTI associated with chronic indwelling Iglesias--patient has a history of recurrent UTIs with MDR organisms  Urine culture however had no significant growth with less than 10,000 CFU polymicrobial growth. .  Received IV Rocephin x3 days but antibiotics discontinued with culture data. .  Iglesias catheter was exchanged on admission. .       -Neurogenic bladder with chronic indwelling Iglesias-continue Iglesias care-change in the ED     -Acute on chronic anemia--has known iron deficiency--no obvious blood loss--continue iron supplementation, monitor H&H--to follow outpatient if stable     -Essential hypertension  -Hyperlipidemia-continue statin  -GERD-continue PPI  -Hypothyroidism-continue Synthroid  -None ambulatory/wheelchair-bound--resides at [facility] facility      Invasive procedures:      Discharge Diagnoses:   Principal Problem:    Complicated UTI (urinary tract infection)  Active Problems:    Normocytic anemia    Essential hypertension    GERD (gastroesophageal reflux disease)    CAD (coronary artery disease)    Hyperlipidemia    Neurogenic bladder  Resolved Problems:    * No resolved hospital problems.  *      Physical Exam: BP (!) 156/87   Pulse 84   Temp 98.1 °F (36.7 °C) (Oral)   Resp 18   Ht 6' (1.829 m)   Wt 197 lb 8.5 oz (89.6 kg)   SpO2 (!) 84%   BMI 26.79 kg/m²   Gen/overall appearance: Not in acute distress. Alert. Head: Normocephalic, atraumatic  Eyes: EOMI, good acuity  ENT:- Oral mucosa moist  Neck: No JVD, thyromegaly  CVS: Nml S1S2, no MRG, RRR  Pulm: Clear bilaterally. No crackles/wheezes  Gastrointestinal: Soft, NT/ND, +BS  Musculoskeletal: No edema. Warm  Neuro: No focal deficit. Moves extremity spontaneously. Psychiatry: Appropriate affect. Not agitated. Skin: Warm, dry with normal turgor. No rash        Significant Diagnostic Studies:    See above        Treatments: As above.       Discharge Medications:     Medication List        CONTINUE taking these medications      acetaminophen 325 MG tablet  Commonly known as: TYLENOL     albuterol sulfate  (90 Base) MCG/ACT inhaler  Commonly known as: Proventil HFA  Inhale 2 puffs into the lungs every 6 hours as needed for Wheezing or Shortness of Breath     atorvastatin 80 MG tablet  Commonly known as: LIPITOR     dicyclomine 20 MG tablet  Commonly known as: BENTYL     docusate sodium 100 MG capsule  Commonly known as: COLACE     ferrous sulfate 325 (65 Fe) MG tablet  Commonly known as: IRON 325     furosemide 40 MG tablet  Commonly known as: LASIX  Take 1 tablet by mouth daily     gabapentin 600 MG tablet  Commonly known as: NEURONTIN     guaiFENesin 600 MG extended release tablet  Commonly known as: MUCINEX  Take 1 tablet by mouth 2 times daily as needed for Congestion     * RONEY PO     * Ensure High Protein Liqd     levothyroxine 75 MCG tablet  Commonly known as: SYNTHROID  Take 1 tablet by mouth Daily     loperamide 2 MG capsule  Commonly known as: IMODIUM     Melatonin 5 MG Caps     MEPILEX EX     pantoprazole 40 MG tablet  Commonly known as: PROTONIX  Take 1 tablet by mouth every morning (before breakfast)     tamsulosin 0.4 MG capsule  Commonly known as: FLOMAX     traZODone 100 MG tablet  Commonly known as: DESYREL     Venelex Oint ointment     vitamin B-12 1000 MCG tablet  Commonly known as: CYANOCOBALAMIN           * This list has 2 medication(s) that are the same as other medications prescribed for you. Read the directions carefully, and ask your doctor or other care provider to review them with you. STOP taking these medications      aspirin 81 MG chewable tablet     miconazole 2 % cream  Commonly known as: MICOTIN              Activity: activity as tolerated  Diet: ADULT DIET; Regular; 5 carb choices (75 gm/meal); Low Fat/Low Chol/High Fiber/2 gm Na  ADULT ORAL NUTRITION SUPPLEMENT; Lunch, Dinner; Protein Modular      Disposition: SNF  Discharged Condition: Stable  Follow Up:   No follow-up provider specified. Code status:  Full Code         Total time spent on discharge, finalizing medications, referrals and arranging outpatient follow up was more than 45 minutes      Thank you Dr. Connie Souza for the opportunity to be involved in this patients care.

## 2022-11-22 NOTE — PROGRESS NOTES
Report called to Magdalena at [facility]. Patient to be discharged per ambulance transport.

## 2022-11-22 NOTE — CARE COORDINATION
Case Management Assessment            Discharge Note                    Date / Time of Note: 11/22/2022 12:10 PM                  Discharge Note Completed by: Kamran Glaser RN    Patient Name: Jeyson Crane   YOB: 1940  Diagnosis: Complicated UTI (urinary tract infection) [N39.0]   Date / Time: 11/19/2022  9:46 PM    [facility] patient: No    Hospitalization in the last 30 days: No    Advance Directives:  Code Status: Full Code  [place] DNR form completed and on chart: Not Indicated    Financial:  Payor: Patt Frankel / Plan: Virgil Sweeney / Product Type: *No Product type* /      Pharmacy:    [facility] Mary Baez [phone] - F [phone]  [address] [facility] [address]  Phone: [phone] Fax: [phone]    [facility] [place], [place] - Bert Collazo [address] [facility] [address] [phone] - F [phone]  [address] [address] [address]  Phone: [phone] Fax: [phone]      Assistance purchasing medications?: Potential Assistance Purchasing Medications: No  Assistance provided by Case Management: None at this time    Does patient want to participate in local refill/ meds to beds program?:      Meds To Beds General Rules:  1. Can ONLY be done Monday- Friday between 8:30am-5pm  2. Prescription(s) must be in pharmacy by 3pm to be filled same day  3. Copy of patient's insurance/ prescription drug card and patient face sheet must be sent along with the prescription(s)  4. Cost of Rx cannot be added to hospital bill. If financial assistance is needed, please contact unit  or ;  or  CANNOT provide pharmacy voucher for patients co-pays  5.  Patients can then  the prescription on their way out of the hospital at discharge, or pharmacy can deliver to the bedside if staff is available. (payment due at time of pick-up or delivery - cash, check, or card accepted)     Able to afford home medications/ co-pay costs: Yes    ADLS:  Current PT AM-PAC Score:   /24  Current OT AM-PAC Score:   /24      DISCHARGE Disposition: [address]: [facility] Phone: [phone] Fax: [phone]    LOC at discharge: Not Applicable  [address] Completed: Yes    Notification completed in HENS/PAS?:  Not Applicable    IMM Completed:   Not Indicated    Transportation:  Transportation PLAN for discharge: EMS transportation   Mode of Transport: Ambulance stretcher - BLS  Reason for medical transport: Bed confined: Meets the following criteria 1) unable to get out of bed without assistance or ambulate, 2) unable to safely sit up in a wheelchair, 3) unable to maintain erect seating position in a chair for time needed for transport  Name of Transport Company: [employer]  Phone: [phone]  Time of Transport: [facility]    Transport form completed: Yes    Home Care:  Home Care ordered at discharge: Not [address]: Not Applicable  Orders faxed: No    Durable Medical Equipment:  DME Provider: n/a  Equipment obtained during hospitalization: defer    Home Oxygen and Respiratory Equipment:  Oxygen needed at discharge?: Not [address]: Not Applicable  Portable tank available for discharge?: Not Indicated    Dialysis:  Dialysis patient: No    Dialysis Center:  Not Applicable    Hospice Services:  Location: Not Applicable  Agency: Not Applicable    Consents signed: Not Indicated    Referrals made at [facility] for outpatient continued care:  Not Applicable    Additional CM Notes:   Patient is discharging back to [facility] at [facility]. CM spoke with RN at facility, left voicemail for [facility] DON. Orders faxed to [phone], nurse to call report to [phone]. Patient scheduled for transport at [address] via [employer]. Patient aware and agreeable to plan, left voicemail for pt's son Marshal David.       The Plan for Transition of Care is related to the following treatment goals of Complicated UTI (urinary tract infection) [N39.0]    The Patient and/or patient representative Kanchan Higginbotham and his family were provided with a choice of provider and agrees with the discharge plan Yes    Freedom of choice list was provided with basic dialogue that supports the patient's individualized plan of care/goals and shares the quality data associated with the providers.  Yes    Care Transitions patient: No    Christina Bergeron RN  [facility]  Case Management Department  Ph: [phone]  Fax: [phone]

## 2022-11-22 NOTE — DISCHARGE INSTR - DIET

## 2022-11-22 NOTE — DISCHARGE INSTRUCTIONS
Extra Heart Failure sites:   https://GelSight.Ameriprime/ --- this is American Heart Association interactive Healthier Living with Heart Failure guidebook. Please copy and paste link into search bar. Use your mouse to scroll through the pages. Lots and lots of info / tips    HF [place] duran  --- free smart phone duran available for [employer] and [employer]. Use your phone to track sodium / fluid intake,  symptoms, weight, etc.    [employer] - website-- [employer] is a dialysis [employer]. All dialysis patients follow a renal diet which IS low sodium!! This website offers free seasonal cookbooks.   Each quarter, they will release 25-30 new recipes with a breakdown of calories, sodium, glucose, etc    www.baseclick.Ameriprime/recipes -- more free recipes

## 2022-11-22 NOTE — PLAN OF CARE
Problem: Pain  Goal: Verbalizes/displays adequate comfort level or baseline comfort level  11/22/2022 0053 by Brandee Moy RN  Outcome: Progressing  Flowsheets (Taken 11/22/2022 0053)  Verbalizes/displays adequate comfort level or baseline comfort level:   Assess pain using appropriate pain scale   Administer analgesics based on type and severity of pain and evaluate response   Implement non-pharmacological measures as appropriate and evaluate response  Note: Pt with no pain throughout shift. Problem: Skin/Tissue Integrity  Goal: Absence of new skin breakdown  Description: 1. Monitor for areas of redness and/or skin breakdown  2. Assess vascular access sites hourly  3. Every 4-6 hours minimum:  Change oxygen saturation probe site  4. Every 4-6 hours:  If on nasal continuous positive airway pressure, respiratory therapy assess nares and determine need for appliance change or resting period. 11/22/2022 0053 by Brandee Moy RN  Outcome: Progressing  Note: Pt being turn Q2 hours. No new skin breakdown noted. Orders being followed for wound care. Will continue to monitor     Problem: Safety - Adult  Goal: Free from fall injury  11/22/2022 0053 by Brandee Moy RN  Outcome: Arpit Saunders (Taken 11/22/2022 0053)  Free From Fall Injury: Based on caregiver fall risk screen, instruct family/caregiver to ask for assistance with transferring infant if caregiver noted to have fall risk factors  Note: Pt is a Fall Risk. See Maren Santos Fall Risk Score. Pt bed in low position and side rails up and bed alarm on. Call light and belongings in reach. Pt encouraged to call for assistance. Will continue with hourly rounds for PO intake, pain needs, toileting, and repositioning as needed.

## 2022-11-22 NOTE — DISCHARGE INSTR - COC
Continuity of Care Form    Patient Name: Misael Hancock   :  1940  MRN:  [id number]    Admit date:  2022  Discharge date:  ***    Code Status Order: Full Code   Advance Directives:     Admitting Physician:  Maddie Melo MD  PCP: Spring Mendoza    Discharging Nurse: [facility] Unit/Room#: 5761/3017-75  Discharging Unit Phone Number: ***    Emergency Contact:   Extended Emergency Contact Information  Primary Emergency Contact: Zahraa Altamirano, Lai Burrell 14 Phone: [phone]  Relation: Child  Secondary Emergency Contact: [facility] [address] Phone: [phone]  Work Phone: [phone]  Mobile Phone: [phone]  Relation: Child    Past Surgical History:  Past Surgical History:   Procedure Laterality Date    BACK SURGERY  2006    lower lumbar    CORONARY ARTERY BYPASS GRAFT  2013    CABG x 5 (Dr Brett Nj), svg to diag, om1 and 3, distal rca, kelly to lad. CYSTOSCOPY N/A 1/10/2019    CYSTOSCOPY performed by Kerri Valdes MD at [facility] Left 2022    LEFT FOOT PARTIAL FIFTH RAY RESECTION WITH EXCISIONAL DEBRIDEMENT OF MUSCLE AND FASCIA, WITH APPLICATION OF GRAFT performed by Gamal Stuart DPM at [facility] Right 2022    INCISION AND DRAINAGE PARTIAL 5TH RAY RESECTION RIGHT FOOT performed by Natan Royal DPM at [address] 2015    ORIF    LEG SURGERY Right 2021    RIGHT LOWER EXTREMITY ADVANCEMENT FLAP AND SPLIT THICKNESS SKIN GRAFT PLACEMENT; (WOUND- 10 CM X 5.5 CM; CLOSURE- 6 CM X 5.5 CM; SKIN GRAFT- 7.2 CM X 5 CM) performed by Armando Tillman MD at [address] [facility] [address] 2020    SIGMOIDOSCOPY DIAGNOSTIC FLEXIBLE performed by Elliot Jones MD at [address] [facility] Right 2022    .  performed by Natan Royal DPM at [address] [facility] 2020    EGD BIOPSY performed by Elliot Jones MD at [address] [facility] GASTROINTESTINAL ENDOSCOPY N/A 9/5/2022    EGD BIOPSY performed by James Solitario MD at [address] ENDOSCOPY       Immunization History:   Immunization History   Administered Date(s) Administered    COVID-19, MODERNA BLUE border, Primary or Immunocompromised, (age 12y+), IM, 100 mcg/0.5mL 05/17/2021    Influenza A ([id number]) Vaccine PF IM 12/10/2009    Influenza Vaccine, unspecified formulation 12/13/2012, 11/06/2014, 10/13/2015, 09/01/2016, 11/03/2017, 10/26/2018, 09/20/2019    Influenza Virus Vaccine 12/19/2005, 12/14/2006, 12/13/2012, 11/30/2013, 10/13/2015, 09/20/2019    Influenza Whole 10/01/2007, 09/02/2010, 09/01/2011, 12/13/2012, 10/13/2015    Influenza, FLUARIX, FLULAVAL, FLUZONE (age 10 mo+) AND AFLURIA, (age 1 y+), PF, 0.5mL 11/04/2021    Influenza, High Dose (Fluzone 65 yrs and older) 11/06/2014, 10/20/2020    PPD Test 09/16/1997    Pneumococcal Conjugate 13-valent (Saojvyy21) 12/29/2014    Pneumococcal Conjugate 7-valent (Prevnar7) 12/29/2014    Pneumococcal Polysaccharide (Dimogffah74) 01/27/2014       Active Problems:  Patient Active Problem List   Diagnosis Code    Hypotension I95.9    Light headed R42    Cellulitis L03.90    Essential hypertension I10    GERD (gastroesophageal reflux disease) K21.9    Acute blood loss anemia D62    Tinea corporis B35.4    Carotid stenosis I65.29    CAD (coronary artery disease) I25.10    S/P CABG 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 XXXA    Complicated UTI (urinary tract infection) N39.0    Edema R60.9    Problem with urinary catheter ([address] T83. 9XXA    Acute encephalopathy G93.40    Chronic indwelling Iglesias catheter Z97.8    Hyperlipidemia E78.5    Bilateral lower leg cellulitis L03.116, L03.115    Neurogenic bladder N31.9    Bacteriuria R82.71    Abnormality of urethral meatus Q64.70    Gross hematuria J65.2    Systolic congestive heart failure ([facility]) I50.20    Dyspnea R06.00    Bradycardia R00.1    Pseudomonas infection A49.8    Acute renal injury ([facility] N17.9    Cellulitis of scrotum N49.2    Constipation K59.00    Encopresis with constipation and overflow incontinence R15.9    Abnormal stress test R94.39    H/O angiography Z92.89    Abnormal angiogram R93.89    Acute cystitis with hematuria N30.01    Acute renal failure superimposed on stage 3 chronic kidney disease (HCC) N17.9, N18.30    Urinary tract infection associated with indwelling urethral catheter ([facility]) T83.511A, N39.0    Encephalopathy acute G93.40    Altered mental status R41.82    Hyperkalemia E87.5    Leg laceration, unspecified laterality, initial encounter S81.819A    Pain of right lower extremity M79.604    Degloving injury T14. 8XXA    Weakness R53.1    Symptomatic bradycardia R00.1    Bilateral leg edema R60.0    Symptomatic sinus bradycardia R00.1    Multiple drug resistant organism (MDRO) culture positive Z16.24    Osteomyelitis ([facility]) M86.9    Chronic multifocal osteomyelitis of right foot ([facility]) M86.371    MRSA infection A49.02    Septic arthritis of interphalangeal joint of toe of right foot ([facility]) M00.9    Elevated sed rate R70.0    Elevated C-reactive protein (CRP) R79.82    Infection requiring contact isolation precautions B99.9    Class 1 obesity due to excess calories with body mass index (BMI) of 30.0 to 30.9 in adult E66.09, Z68.30    Weight loss counseling, encounter for Z71.3    Electrolyte imbalance E87.8    Normocytic anemia D64.9       Isolation/Infection:   Isolation            Contact          Patient Infection Status       Infection Onset Added Last Indicated Last Indicated By Review Planned Expiration Resolved Resolved By    MRSA 05/25/22 05/27/22 08/30/22 Culture, Wound Aerobic Only        MDRO (multi-drug resistant organism) 04/22/22 04/24/22 04/22/22 Culture, Urine        Resolved    C-diff (Clostridium difficile) 04/11/22 04/12/22 04/11/22 C. difficile toxin Molecular   22 Kash [facility], RN    Pt has had no inpatient admissions in past 30 days (diagnosed on  and discharged ), had been off antibiotics greater than 48 hrs (and had them restarted 22 upon admission) at the 30-day nuris ( prior to them being started    C-diff Rule Out 22 C. difficile toxin Molecular (Ordered)   22 Rule-Out Test Resulted    C-diff Rule Out 22 Clostridium Difficile Toxin/Antigen (Ordered)   22 Rule-Out Test Resulted    Influenza 22 Rapid influenza A/B antigens   22     COVID-19 (Rule Out) 22 COVID-19, Rapid (Ordered)   22 Rule-Out Test Resulted    COVID-19 (Rule Out) 22 COVID-19, Rapid (Ordered)   22 Rule-Out Test Resulted    COVID-19 (Rule Out) 22 COVID-19, Rapid (Ordered)   22 Rule-Out Test Resulted    MDRO (multi-drug resistant organism) 21 Culture, Urine   21 Kash [facility], RN    New urine cx on 2021 with no growth/was negative    COVID-19 (Rule Out) 21 COVID-19, Rapid (Ordered)   21 Rule-Out Test Resulted    C-diff Rule Out 21 GI Bacterial Pathogens By PCR (Ordered)   21 Lis Ramírez RN    Order noted to be discontinued by MD; not included in GI pathogens order    COVID-19 (Rule Out) 21 COVID-19, Rapid (Ordered)   21 Rule-Out Test Resulted    C-diff Rule Out 20 Clostridium difficile toxin/antigen (Ordered)   21 Jose Manuel Grant RN    No stool ever sent.      COVID-19 (Rule Out) 06/10/20 06/10/20 06/10/20 COVID-19 (Ordered)   06/10/20 Rule-Out Test Resulted    C-diff Rule Out 20 GI Bacterial Pathogens By PCR (Ordered)   06/10/20 Rule-Out Test Resulted    C-diff Rule Out 20 06/09/20 06/09/20 Clostridium difficile toxin/antigen (Ordered)   06/09/20 Rule-Out Test Resulted    C-diff Rule Out 05/03/20 05/04/20 05/04/20 C. difficile toxin Molecular (Ordered)   05/05/20 Rule-Out Test Resulted    MDRO (multi-drug resistant organism) 05/02/20 05/04/20 05/02/20 Culture, Urine   04/22/21 Cameron Correa, RN    Has had new urine culture on 4/17/21 with mixed anne; no MDRO noted    COVID-19 (Rule Out) 05/03/20 05/03/20 05/03/20 COVID-19 (Ordered)   05/03/20 Rule-Out Test Resulted    C-diff Rule Out 05/02/20 05/02/20 05/03/20 Clostridium difficile toxin/antigen (Ordered)   05/03/20 Rule-Out Test Resulted    MRSA 02/07/20 02/08/20 02/07/20 MRSA DNA Probe, Nasal   05/04/20 Jamal Hinkle    MDRO (multi-drug resistant organism) 10/26/19 10/28/19 11/19/19 Urine culture   01/16/20 Jamal Hinkle            Nurse Assessment:  Last Vital Signs: BP (!) 156/87   Pulse 84   Temp 98.1 °F (36.7 °C) (Oral)   Resp 18   Ht 6' (1.829 m)   Wt 197 lb 8.5 oz (89.6 kg)   SpO2 (!) 84%   BMI 26.79 kg/m²     Last documented pain score (0-10 scale): Pain Level: 6  Last Weight:   Wt Readings from Last 1 Encounters:   11/22/22 197 lb 8.5 oz (89.6 kg)     Mental Status:  {IP PT MENTAL STATUS:28173}    IV Access:  {[facility] IV ACCESS:727548914}    Nursing Mobility/ADLs:  Walking   {CHP DME EYPS:994150305}  Transfer  {CHP DME CQCD:750384779}  Bathing  {CHP DME KCTA:404054278}  Dressing  {CHP DME GSUT:951296739}  Toileting  {CHP DME JBIB:000258889}  Feeding  {CHP DME QYWU:849831145}  Med Admin  {CHP DME HXNO:648017484}  Med Delivery   { AZRA MED Delivery:675519806}    Wound Care Documentation and Therapy:  Negative Pressure Wound Therapy Leg Anterior; Lower;Right (Active)   Number of days: 385       Wound 11/01/21 Buttocks Right;Left redness, open areas, stage 2 ulcer (Active)   Number of days: 386       Wound 01/27/22 Sacrum Inner;Medial;Left;Right (Active)   Number of days: 299       Wound 01/27/22 Pretibial Right (Active)   Number of days: 299       Wound 01/27/22 Elbow Right;Posterior (Active)   Number of days: 299       Wound 05/26/22 Sacrum Medial (Active)   Number of days: 180       Wound 11/20/22 Coccyx Left;Right (Active)   Wound Etiology Pressure Stage 2 11/22/22 0846   Dressing Status Clean;Dry; Intact 11/22/22 0846   Wound Cleansed Not Cleansed 11/22/22 0846   Dressing/Treatment Pharmaceutical agent (see MAR) 11/22/22 0846   Wound Assessment Bleeding;Denuded;Pink/red 11/22/22 0846   Drainage Amount Scant 11/22/22 0846   Drainage Description Pink 11/22/22 0846   Odor None 11/22/22 0846   Usha-wound Assessment Blanchable erythema; Maceration;Denuded 11/22/22 0846   Number of days: 2       Wound 11/20/22 Ankle Right;Left (Active)   Wound Etiology Pressure Stage 2 11/22/22 0846   Dressing Status Clean;Dry; Intact 11/22/22 0846   Wound Cleansed Not Cleansed 11/22/22 0846   Dressing/Treatment Foam 11/22/22 0846   Wound Assessment Pink/red;Erythema;Denuded 11/22/22 0846   Drainage Amount None 11/22/22 0846   Odor None 11/21/22 1645   Number of days: 2       Incision 05/29/22 Foot Anterior; Left (Active)   Number of days: 177       Incision 09/02/22 Foot Anterior;Right;Lateral (Active)   Number of days: 80       Incision 11/02/21 Thigh Anterior;Right (Active)   Number of days: 385        Elimination:  Continence: Bowel: No  Bladder: No  Urinary Catheter: INSERTED 11/22/22  Colostomy/Ileostomy/Ileal Conduit: No       Date of Last BM: ***    Intake/Output Summary (Last 24 hours) at 11/22/2022 1034  Last data filed at 11/22/2022 0616  Gross per 24 hour   Intake 480 ml   Output 950 ml   Net -470 ml     I/O last 3 completed shifts: In: 8516 [P.O.:1780; I.V.:10]  Out: 1700 [Urine:1700]    Safety Concerns:      At Risk for Falls    Impairments/Disabilities:      None    Nutrition Therapy:  Current Nutrition Therapy:   - Oral Diet:  General    Routes of Feeding: Oral  Liquids: No Restrictions  Daily Fluid Restriction: no  Last Modified Barium Swallow with Video (Video Swallowing Test): not done    Treatments at the Time of Hospital Discharge:   Respiratory Treatments: N/A  Oxygen Therapy:  is not on home oxygen therapy. Ventilator:    - No ventilator support    Heart Failure Instructions for Daily Management  Patient was treated for chronic systolic heart failure. he  will require the following:    Please weigh daily on the same scale and approximately the same time of day. Report weight gain of 3 pounds/day or 5 pounds/week to : [facility] MD and [facility] / Cristina Moon [phone]. Please use hospital discharge weight as baseline reference. Please monitor for signs and symptoms of and report to MD:  Worsening Heart Failure: sudden weight gain, shortness of breath, lower extremity or general edema/swelling, abdominal bloating/swelling, inability to lie flat, intolerance to usual activity, or cough (especially at night). Report these finding even if no increase in weight. Dehydration:  having difficulty or a decrease in urination, dizziness, worsening fatigue, or new onset/worsening of generalized weakness. Please continue a LOW SODIUM diet and LIMIT fluid intake to 48 - 64 ounces ( 1.5 - 2 liters) per day. Call facility MD and [facility] / Cristina Moon [phone] with any questions or concerns. Please continue heart failure education to patient and family/support system. See After Visit Summary for hospital follow up appointment details. Consider spiritual care referral for support and/or completion of advance directives . Consider: having the facility MD complete required 7 day follow up. Patient's primary cardiologist is Dr Gabby Kathleen.        Rehab Therapies: Physical Therapy and Occupational Therapy  Weight Bearing Status/Restrictions: No weight bearing restrictions  Other Medical Equipment (for information only, NOT a DME order):  wheelchair and bedside commode  Other Treatments: N/A    Patient's personal belongings (please select all that are sent with patient):  {[facility] DME Belongings:672952249}    RN SIGNATURE:  Electronically signed by Rashaad Goználes RN on 11/22/22 at 3:33 PM EST    CASE MANAGEMENT/SOCIAL WORK SECTION    Inpatient Status Date: ***    Readmission Risk Assessment Score:  Readmission Risk              Risk of Unplanned Readmission:  55           Discharging to Facility/ Agency   Name: Brett Arias    Phone: [phone]        / signature: Electronically signed by Kalee Gleason RN on 11/22/22 at 12:34 PM EST    PHYSICIAN SECTION    Prognosis: Fair    Condition at Discharge: Stable    Rehab Potential (if transferring to Rehab): Fair    Recommended Labs or Other Treatments After Discharge:     Physician Certification: I certify the above information and transfer of Jean Pierre Mcgraw  is necessary for the continuing treatment of the diagnosis listed and that he requires [facility] for greater 30 days.      Update Admission H&P: No change in H&P    PHYSICIAN SIGNATURE:  Electronically signed by Rodo Maher MD on 11/22/22 at 10:34 AM EST

## 2022-11-22 NOTE — PROGRESS NOTES
Physician Progress Note      Eli Lugo  CSN #:                  [id number]  :                       1940  ADMIT DATE:       2022 9:46 PM  [address] [place] DATE:  Ormat Stephan  PROVIDER #:        Linda Wahl MD          QUERY TEXT:    Pt admitted with presumed UTI 2/2 chronic schneider. Per DC Summary: Urine culture   however had no significant growth with less than 10,000 CFU polymicrobial   growth. Received IV Rocephin x3 days but antibiotics discontinued with   culture data. Schneider catheter was exchanged in ED on admission. If possible,   please document in the progress notes and discharge summary if you are   evaluating and/or treating any of the following: The medical record reflects the following: PMH frequent UTIs with chronic   schneider  Risk Factors: 81 y.o. male wheelchair bound, with neurogenic bladder, HTN,   anemia, iron deficiency, has chronic schneider  Clinical Indicators: Per ED presented with urinary pain  Treatment: UA, UCx, Rocephin, but was DC'd following UCx results. Catheter   exchanged in ED  Options provided:  -- UTI ruled out  -- Urinary Tract Infection(UTI)  -- Other - I will add my own diagnosis  -- Disagree - Not applicable / Not valid  -- Disagree - Clinically unable to determine / Unknown  -- Refer to Clinical Documentation Reviewer    PROVIDER RESPONSE TEXT:    After study, UTI ruled out. Query created by: Lucille Argueta on 2022 2:21 PM      Electronically signed by:   Linda Wahl MD 2022 2:39 PM

## 2022-12-02 ENCOUNTER — HOSPITAL ENCOUNTER (EMERGENCY)
Age: 82
Discharge: NURSING, REHAB OR LONG-TERM CARE | End: 2022-12-02
Attending: EMERGENCY MEDICINE
Payer: MEDICARE

## 2022-12-02 VITALS
DIASTOLIC BLOOD PRESSURE: 64 MMHG | TEMPERATURE: 97.6 F | WEIGHT: 224.9 LBS | RESPIRATION RATE: 16 BRPM | HEART RATE: 72 BPM | HEIGHT: 72 IN | SYSTOLIC BLOOD PRESSURE: 137 MMHG | OXYGEN SATURATION: 94 % | BODY MASS INDEX: 30.46 KG/M2

## 2022-12-02 DIAGNOSIS — Z00.8 ENCOUNTER FOR MEDICAL ASSESSMENT: Primary | ICD-10-CM

## 2022-12-02 LAB
ANION GAP SERPL CALCULATED.3IONS-SCNC: 9 MMOL/L (ref 3–16)
BASOPHILS ABSOLUTE: 0.1 K/UL (ref 0–0.2)
BASOPHILS RELATIVE PERCENT: 1.2 %
BUN BLDV-MCNC: 31 MG/DL (ref 7–20)
CALCIUM SERPL-MCNC: 8.6 MG/DL (ref 8.3–10.6)
CHLORIDE BLD-SCNC: 102 MMOL/L (ref 99–110)
CO2: 30 MMOL/L (ref 21–32)
CREAT SERPL-MCNC: 0.8 MG/DL (ref 0.8–1.3)
EOSINOPHILS ABSOLUTE: 0.5 K/UL (ref 0–0.6)
EOSINOPHILS RELATIVE PERCENT: 7.3 %
GFR SERPL CREATININE-BSD FRML MDRD: >60 ML/MIN/{1.73_M2}
GLUCOSE BLD-MCNC: 130 MG/DL (ref 70–99)
HCT VFR BLD CALC: 25.7 % (ref 40.5–52.5)
HEMOGLOBIN: 7.9 G/DL (ref 13.5–17.5)
LYMPHOCYTES ABSOLUTE: 0.9 K/UL (ref 1–5.1)
LYMPHOCYTES RELATIVE PERCENT: 12.7 %
MCH RBC QN AUTO: 27 PG (ref 26–34)
MCHC RBC AUTO-ENTMCNC: 30.8 G/DL (ref 31–36)
MCV RBC AUTO: 87.5 FL (ref 80–100)
MONOCYTES ABSOLUTE: 0.6 K/UL (ref 0–1.3)
MONOCYTES RELATIVE PERCENT: 8.3 %
NEUTROPHILS ABSOLUTE: 5 K/UL (ref 1.7–7.7)
NEUTROPHILS RELATIVE PERCENT: 70.5 %
PDW BLD-RTO: 19.7 % (ref 12.4–15.4)
PLATELET # BLD: 301 K/UL (ref 135–450)
PMV BLD AUTO: 8.6 FL (ref 5–10.5)
POTASSIUM REFLEX MAGNESIUM: 3.9 MMOL/L (ref 3.5–5.1)
RBC # BLD: 2.93 M/UL (ref 4.2–5.9)
SODIUM BLD-SCNC: 141 MMOL/L (ref 136–145)
WBC # BLD: 7.2 K/UL (ref 4–11)

## 2022-12-02 PROCEDURE — 36415 COLL VENOUS BLD VENIPUNCTURE: CPT

## 2022-12-02 PROCEDURE — 85025 COMPLETE CBC W/AUTO DIFF WBC: CPT

## 2022-12-02 PROCEDURE — 99283 EMERGENCY DEPT VISIT LOW MDM: CPT

## 2022-12-02 PROCEDURE — 80048 BASIC METABOLIC PNL TOTAL CA: CPT

## 2022-12-02 ASSESSMENT — ENCOUNTER SYMPTOMS
STRIDOR: 0
VOMITING: 0
ABDOMINAL PAIN: 0
COUGH: 0
RHINORRHEA: 0
SHORTNESS OF BREATH: 0
EYE DISCHARGE: 0
EYE REDNESS: 0

## 2022-12-02 ASSESSMENT — PAIN - FUNCTIONAL ASSESSMENT: PAIN_FUNCTIONAL_ASSESSMENT: NONE - DENIES PAIN

## 2022-12-02 NOTE — ED PROVIDER NOTES
ED Attending Attestation Note     Date of evaluation: 12/2/2022    This patient was seen by the resident. I have seen and examined the patient, agree with the workup, evaluation, management and diagnosis. The care plan has been discussed. I was present for any procedures performed in the resident's  note and have made edits to the note where appropriate. My assessment reveals 81 y.o. male with complex past medical history presenting from his nursing facility for hemoglobin 6.8. He is well-known to this emergency department in hospital and appears at his baseline. Labs are reassuring, in no distress. He is a difficult historian. Chronically ill-appearing but nontoxic, does appear somewhat pale. Respirations even and unlabored.        Diann Higginbotham MD  12/02/22 2613

## 2022-12-02 NOTE — DISCHARGE INSTRUCTIONS
Kishor Mckeon was seen in the emergency department for possible low blood count. His hemoglobin was 7.9 here and thus did not qualify for transfusion. We are happy to see Kishor Mckeon at any time with any other concerns.

## 2022-12-02 NOTE — ED PROVIDER NOTES
[address] [facility]          EM RESIDENT NOTE       Date of evaluation: 12/2/2022    Chief Complaint   Anemia    History of Present Illness     Mary Barnard is a 81 y.o. male with history of c diff, carotid stenosis, afib, HTN, MDRO, renal insufficiency, CHF who presents upon instruction of his nursing home for low hemoglobin of 6.8. Patient has a history of anemia which is followed for likely iron deficiency. He has had several transfusions for this in the past.  On screening laboratory evaluation today it was low and he was sent in. The patient has no specific complaints at this time. When asked if anything is bothering him he said \"you are. \"  See review of systems below for further details. He specifically denies abdominal pain Bandar, vomiting, rectal bleeding. Denies chest pain or shortness of breath. Review of Systems     Review of Systems   Constitutional:  Positive for fatigue. Negative for chills and fever. HENT:  Negative for congestion and rhinorrhea. Eyes:  Negative for discharge and redness. Respiratory:  Negative for cough, shortness of breath and stridor. Cardiovascular:  Negative for chest pain and palpitations. Gastrointestinal:  Negative for abdominal pain and vomiting. Genitourinary:  Negative for dysuria and hematuria. Musculoskeletal:  Negative for gait problem and joint swelling. Skin:  Positive for pallor. Negative for rash and wound. Neurological:  Negative for seizures, syncope and headaches. Hematological:  Negative for adenopathy. Does not bruise/bleed easily. Psychiatric/Behavioral:  Negative for agitation and behavioral problems.       Past Medical, Surgical, Family, and Social History     He has a past medical history of BPH (benign prostatic hyperplasia), C. difficile colitis, Carotid stenosis, Cellulitis, Chronic back pain, Encounter for imaging to screen for metal prior to MRI, GERD (gastroesophageal reflux disease), History of atrial fibrillation, Hypertension, Lower GI bleed, MDRO (multiple drug resistant organisms) resistance, Neuromuscular disorder (HCC), Renal insufficiency, Septic arthritis of interphalangeal joint of toe of right foot ([address] Systolic congestive heart failure ([facility] [address] and Vitamin B12 deficiency. 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 His family history includes Heart Disease in his father. He reports that he quit smoking about 53 years ago. He has never used smokeless tobacco. He reports that he does not drink alcohol and does not use drugs. Medications     Previous Medications    ACETAMINOPHEN (TYLENOL) 325 MG TABLET    Take 650 mg by mouth every 6 hours as needed for Pain    ALBUTEROL SULFATE HFA (PROVENTIL HFA) 108 (90 BASE) MCG/ACT INHALER    Inhale 2 puffs into the lungs every 6 hours as needed for Wheezing or Shortness of Breath    ATORVASTATIN (LIPITOR) 80 MG TABLET    Take 80 mg by mouth nightly. BALSAM PERU-CASTOR OIL (VENELEX) OINT OINTMENT    Apply topically daily as needed    DICYCLOMINE (BENTYL) 20 MG TABLET    Take 20 mg by mouth 3 times daily as needed    DOCUSATE SODIUM (COLACE) 100 MG CAPSULE    Take 100 mg by mouth every morning (before breakfast)    FERROUS SULFATE (IRON 325) 325 (65 FE) MG TABLET    Take 325 mg by mouth in the morning and at bedtime    FUROSEMIDE (LASIX) 40 MG TABLET    Take 1 tablet by mouth daily    GABAPENTIN (NEURONTIN) 600 MG TABLET    Take 600 mg by mouth 2 times daily.     GUAIFENESIN (MUCINEX) 600 MG EXTENDED RELEASE TABLET    Take 1 tablet by mouth 2 times daily as needed for Congestion    LEVOTHYROXINE (SYNTHROID) 75 MCG TABLET    Take 1 tablet by mouth Daily    LOPERAMIDE (IMODIUM) 2 MG CAPSULE    Take 2 mg by mouth 4 times daily as needed for Diarrhea    MELATONIN 5 MG CAPS    Take 1 capsule by mouth nightly    NUTRITIONAL SUPPLEMENTS (ENSURE HIGH PROTEIN) LIQD    Take 1 Can by mouth 2 times daily    NUTRITIONAL SUPPLEMENTS (RONEY PO)    Take 1 packet by mouth 2 times daily    PANTOPRAZOLE (PROTONIX) 40 MG TABLET    Take 1 tablet by mouth every morning (before breakfast)    TAMSULOSIN (FLOMAX) 0.4 MG CAPSULE    Take 0.4 mg by mouth daily    TRAZODONE (DESYREL) 100 MG TABLET    Take 100 mg by mouth nightly    VITAMIN B-12 (CYANOCOBALAMIN) 1000 MCG TABLET    Take 1,000 mcg by mouth daily    WOUND DRESSINGS (MEPILEX EX)    Apply topically Cleanse skin graft areas with normal saline, pat dry, apply mepilex foam board, change every 3 days as needed. Allergies     He is allergic to bactrim [sulfamethoxazole-trimethoprim]. Physical Exam     INITIAL VITALS: BP: (!) 152/67, Temp: 97.6 °F (36.4 °C), Heart Rate: 72, Resp: 16, SpO2: 95 %   Physical Exam  Constitutional:       General: He is not in acute distress. Appearance: Normal appearance. He is normal weight. He is not ill-appearing, toxic-appearing or diaphoretic. HENT:      Head: Normocephalic and atraumatic. Right Ear: External ear normal.      Left Ear: External ear normal.      Nose: Nose normal. No rhinorrhea. Mouth/Throat:      Mouth: Mucous membranes are moist.      Pharynx: Oropharynx is clear. Eyes:      Extraocular Movements: Extraocular movements intact. Pupils: Pupils are equal, round, and reactive to light. Cardiovascular:      Rate and Rhythm: Normal rate and regular rhythm. Pulses: Normal pulses. Heart sounds: Normal heart sounds. No murmur heard. No friction rub. No gallop. Pulmonary:      Effort: No respiratory distress.       Breath sounds: Normal breath sounds. No stridor. No wheezing, rhonchi or rales. Abdominal:      Palpations: Abdomen is soft. Tenderness: There is no abdominal tenderness. There is no guarding or rebound. Musculoskeletal:         General: No swelling or tenderness. Normal range of motion. Cervical back: Normal range of motion and neck supple. Skin:     General: Skin is warm and dry. Coloration: Skin is pale. Skin is not jaundiced. Findings: Bruising present. Neurological:      General: No focal deficit present. Mental Status: He is alert and oriented to person, place, and time. Mental status is at baseline.    Psychiatric:         Mood and Affect: Mood normal.         Behavior: Behavior normal.       DiagnosticResults   LABS:   Results for orders placed or performed during the hospital encounter of 12/02/22   CBC with Auto Differential   Result Value Ref Range    WBC 7.2 4.0 - 11.0 K/uL    RBC 2.93 (L) 4.20 - 5.90 M/uL    Hemoglobin 7.9 (L) 13.5 - 17.5 g/dL    Hematocrit 25.7 (L) 40.5 - 52.5 %    MCV 87.5 80.0 - 100.0 fL    MCH 27.0 26.0 - 34.0 pg    MCHC 30.8 (L) 31.0 - 36.0 g/dL    RDW 19.7 (H) 12.4 - 15.4 %    Platelets 655 819 - 597 K/uL    MPV 8.6 5.0 - 10.5 fL    Neutrophils % 70.5 %    Lymphocytes % 12.7 %    Monocytes % 8.3 %    Eosinophils % 7.3 %    Basophils % 1.2 %    Neutrophils Absolute 5.0 1.7 - 7.7 K/uL    Lymphocytes Absolute 0.9 (L) 1.0 - 5.1 K/uL    Monocytes Absolute 0.6 0.0 - 1.3 K/uL    Eosinophils Absolute 0.5 0.0 - 0.6 K/uL    Basophils Absolute 0.1 0.0 - 0.2 K/uL   BMP w/ Reflex to MG   Result Value Ref Range    Sodium 141 136 - 145 mmol/L    Potassium reflex Magnesium 3.9 3.5 - 5.1 mmol/L    Chloride 102 99 - 110 mmol/L    CO2 30 21 - 32 mmol/L    Anion Gap 9 3 - 16    Glucose 130 (H) 70 - 99 mg/dL    BUN 31 (H) 7 - 20 mg/dL    Creatinine 0.8 0.8 - 1.3 mg/dL    Est, Glom Filt Rate >60 >60    Calcium 8.6 8.3 - 10.6 mg/dL       RECENT VITALS:  BP: (!) 152/67, Temp: 97.6 °F (36.4 °C), Heart Rate: 72,Resp: 16, SpO2: 95 %       ED Course     Nursing Notes, Past Medical Hx, Past Surgical Hx, Social Hx, Allergies, and Family Hx were reviewed. The patient was given the followingmedications:  No orders of the defined types were placed in this encounter. CONSULTS:  None    MEDICAL DECISION MAKING / ASSESSMENT / PLAN     Serge Velez is a 81 y.o. male with history of c diff, carotid stenosis, afib, HTN, MDRO, renal insufficiency, CHF who presents upon instruction of his nursing home for low hemoglobin of 6.8. initial impression notable for chronically ill appearing 82 yo male in no acute distress. Stable vitals. Labs drawn and sent. Hemoglobin is 7.9 here. Patient has no specific complaints at this time. He has no rectal bleeding on history. Does not meet transfusion threshold at this time. Patient has known history of iron deficiency anemia. Recommend discharge home to his nursing home at this time. This patient was also evaluated by the attending physician. All care plans were discussed and agreed upon. Clinical Impression     1.  Encounter for medical assessment        Disposition     PATIENT REFERRED TO:  Heaven Garcia    Schedule an appointment as soon as possible for a visit       DISCHARGE MEDICATIONS:  New Prescriptions    No medications on file       DISPOSITION Decision To Discharge 12/02/2022 01:17:10 PM       Mónica Cassidy MD  Resident  12/02/22 [id number]

## 2023-05-25 ENCOUNTER — HOSPITAL ENCOUNTER (EMERGENCY)
Age: 83
Discharge: HOME | End: 2023-05-25
Attending: STUDENT IN AN ORGANIZED HEALTH CARE EDUCATION/TRAINING PROGRAM
Payer: MEDICARE

## 2023-05-25 VITALS
RESPIRATION RATE: 18 BRPM | HEART RATE: 66 BPM | SYSTOLIC BLOOD PRESSURE: 144 MMHG | WEIGHT: 200 LBS | OXYGEN SATURATION: 95 % | TEMPERATURE: 97.7 F | HEIGHT: 72 IN | BODY MASS INDEX: 27.09 KG/M2 | DIASTOLIC BLOOD PRESSURE: 69 MMHG

## 2023-05-25 DIAGNOSIS — L89.90 PRESSURE ULCERS OF SKIN OF MULTIPLE TOPOGRAPHIC SITES: Primary | ICD-10-CM

## 2023-05-25 LAB
ANION GAP SERPL CALCULATED.3IONS-SCNC: 6 MMOL/L (ref 3–16)
BASOPHILS # BLD: 0.1 K/UL (ref 0–0.2)
BASOPHILS NFR BLD: 0.7 %
BUN SERPL-MCNC: 27 MG/DL (ref 7–20)
CALCIUM SERPL-MCNC: 8.9 MG/DL (ref 8.3–10.6)
CHLORIDE SERPL-SCNC: 103 MMOL/L (ref 99–110)
CO2 SERPL-SCNC: 27 MMOL/L (ref 21–32)
CREAT SERPL-MCNC: 0.8 MG/DL (ref 0.8–1.3)
DEPRECATED RDW RBC AUTO: 22.5 % (ref 12.4–15.4)
EOSINOPHIL # BLD: 0.7 K/UL (ref 0–0.6)
EOSINOPHIL NFR BLD: 8.4 %
GFR SERPLBLD CREATININE-BSD FMLA CKD-EPI: >60 ML/MIN/{1.73_M2}
GLUCOSE SERPL-MCNC: 116 MG/DL (ref 70–99)
HCT VFR BLD AUTO: 41 % (ref 40.5–52.5)
HGB BLD-MCNC: 12.7 G/DL (ref 13.5–17.5)
LYMPHOCYTES # BLD: 1.8 K/UL (ref 1–5.1)
LYMPHOCYTES NFR BLD: 20.5 %
MCH RBC QN AUTO: 26.7 PG (ref 26–34)
MCHC RBC AUTO-ENTMCNC: 31 G/DL (ref 31–36)
MCV RBC AUTO: 86 FL (ref 80–100)
MONOCYTES # BLD: 0.6 K/UL (ref 0–1.3)
MONOCYTES NFR BLD: 7.3 %
NEUTROPHILS # BLD: 5.5 K/UL (ref 1.7–7.7)
NEUTROPHILS NFR BLD: 63.1 %
PLATELET # BLD AUTO: 284 K/UL (ref 135–450)
PMV BLD AUTO: 8 FL (ref 5–10.5)
POTASSIUM SERPL-SCNC: 4.6 MMOL/L (ref 3.5–5.1)
RBC # BLD AUTO: 4.77 M/UL (ref 4.2–5.9)
SODIUM SERPL-SCNC: 136 MMOL/L (ref 136–145)
WBC # BLD AUTO: 8.7 K/UL (ref 4–11)

## 2023-05-25 PROCEDURE — 80048 BASIC METABOLIC PNL TOTAL CA: CPT

## 2023-05-25 PROCEDURE — 85025 COMPLETE CBC W/AUTO DIFF WBC: CPT

## 2023-05-25 PROCEDURE — 84132 ASSAY OF SERUM POTASSIUM: CPT

## 2023-05-25 PROCEDURE — 99283 EMERGENCY DEPT VISIT LOW MDM: CPT

## 2023-05-25 ASSESSMENT — PAIN SCALES - GENERAL: PAINLEVEL_OUTOF10: 4

## 2023-05-25 ASSESSMENT — PAIN - FUNCTIONAL ASSESSMENT: PAIN_FUNCTIONAL_ASSESSMENT: 0-10

## 2023-05-25 NOTE — CARE COORDINATION
ADDENDUM:  4:26 PM  Shoaib scheduled transport via [facility] 9:30pm.    Electronically signed by ARIC Gardner LSW on 5/25/2023 at 4:26 PM      4:13 PM  SHOAIB spoke to admissions at [facility] they are okay w/pt returning today. They will set up [place] [id number] for wound care for pt. They will also try using a wedge and repositioning pt frequently. Electronically signed by ARIC Gardner LSW on 5/25/2023 at 4:13 PM    SHOAIB spoke admissions at [facility] SHOAIB abrams explained pt doesnt qualify for a SNF stay as the wound is only stage 2, he doesnt require any abx care,and will have to return to the [facility]. She stated she will have to talk to the don and call SW back. Electronically signed by ARIC Gardner LSW on 5/25/2023 at 3:29 PM        Pt's Assisted living [facility] is refusing to accept pt back due to wound care. Pt is active w/[facility] for wound care. Pt will not qualify for a SNF stay due to being non-ambulatory at baseline. Pt currently has no other qualifying services for SNF. SW will follow up w/[facility] as pt private pays to live there and has no other options and must return.      Electronically signed by ARIC Gardner LSW on 5/25/2023 at 3:20 PM  [phone]

## 2023-05-25 NOTE — DISCHARGE INSTRUCTIONS
You were seen today due to stage II ulcers. They do not appear infected and do not need antibiotics.

## 2023-05-25 NOTE — ED PROVIDER NOTES
[id number] [facility] RESIDENT NOTE       Date of evaluation: 5/25/2023    Chief Complaint     Wound Check (Nursing facility reports that his stage 2 pressure ulcers are infected and painful to debride )      History of Present Illness     Jeses Borges is a 81 y.o. male with history of c diff, carotid stenosis, afib, HTN, MDRO, renal insufficiency, CHF who presents from a nursing facility due to worsening pressure ulcers. Patient has a stage II pressure ulcer that are scattered throughout his back. He was sent by his nursing facility due to concerns for a possible infection. Patient reports that he does have pain in his ulcers intermittently, but is currently asymptomatic. Patient reports that if he is nursing facility had told him to come, he would not otherwise present. Patient denies any fevers, chills, chest pain, shortness of breath. Denies any numbness or tingling, saddle anesthesia, incontinence. Denies any nausea, vomiting, constipation, diarrhea, abdominal pain. Patient has been getting wound care at his facility. Assisted living facility called. Has been receiving services from [facility] for wound care. Report that he has a stage 2 ulcer that they are not able to take care of and will likely need a new facility d/t limited services at [facility] for his wound care. Talked to coordinator and Ute and he is unable to return due to his stage 2 ulcers. MEDICAL DECISION MAKING / ASSESSMENT / PLAN     INITIAL VITALS: BP: (!) 137/56, Temp: 97.7 °F (36.5 °C), Pulse: 66, Respirations: 18, SpO2: 94 %    ED Course as of 05/25/23 1620   Thu May 25, 2023   1232 Jesse Borges is a 81 y.o. male who presents for pressure wounds. Upon presentation, patient was afebrile, not tachycardic, satting well on RA. On examination, patient was in no acute distress, well appearing, breathing spontaneously, belly soft, non-tender.  Examination was remarkable for stage II

## 2023-05-25 NOTE — ED PROVIDER NOTES
ED Attending Attestation Note     Date of evaluation: 5/25/2023    This patient was seen by the resident. I have seen and examined the patient, agree with the workup, evaluation, management and diagnosis. The care plan has been discussed. I have reviewed the ECG and concur with the resident's interpretation. My assessment reveals a gentleman who presents with wound check concerns. The nursing facility where he has had does not have a wound care nurse. He has absolutely no complaints today. The staff at the nursing facility are reportedly are concerned that his sacral decubitus ulcers are not improving. On exam patient is awake alert conversant. I examined various pressure ulcers on his back sacral region which have good granulation tissue in the wound beds no surrounding erythema, or purulent material.  I also examined the scrotum and the entire perineum and genitourinary region with Kerrie Crane RN present as chaperone which was only notable for the presence of a indwelling catheter. Lower extremities are noted several additional areas of ulceration including the left heel and the right anterior shin. These all appear to have good granulation tissue and are free of any purulence, surrounding erythema, warmth, edema. Throughout the entire skin exam there is no evidence for any bullae, blisters, crepitus. The patient is very clinically well-appearing and has normal vitals. I have a very low suspicion for any type of necrotizing soft tissue infection, abscess formation, involvement of underlying bone (especially given absence of pain), or systemic infection such as bacteremia. The patient appears to have relatively stable and chronic pressure ulcers. It appears that the nursing facility is extremely limited ability to care for these, and we will discuss with case management to confirm the need for an alternative facility to allow for regular wound care nursing.      Suzie Brooks MD  05/25/23 [id number]

## 2023-05-25 NOTE — PROGRESS NOTES
Occupational Therapy/Physical Therapy  Discharge    OT/PT orders received, chart reviewed. Pt well known to PT/OT services from admissions 2022. Pt lives at [address] [place] [address]. Per chart from June 2022 admission, pt is non-ambulatory at baseline, raymundo lift is used to transfer patient. He is dependent with all ADLs in bed. Pt is not appropriate for acute OT/PT evaluations or treatment. Anticipate pt will return to [facility] with prior care.   Will sign off.      Alex Figuero[address]  Jeniffer Arriaga

## 2023-05-26 NOTE — ED NOTES
Patient prepared for and ready to be discharged. Patient discharged at this time in no acute distress after verbalizing understanding of discharge instructions. Patient left after receiving After Visit Summary instructions.       Sera Bell RN  05/25/23 [id number]

## 2023-10-17 NOTE — H&P
Gill Mairna  presents for a post surgical visit, s/p sleeve gastrectomy on 8/7/23 .She  stayed the same weight x 1month. Reports eating plan has improved. Gill reports the following usual day's intake: B:fruit smoothie (pineapple, katherine, apple, water), S: hb egg, L: salad (grilled chicken, hb egg, lettuce, parmesan cheese, oil based dressing), S:skip, D:similar to lunch, S:skip. She is consuming the following beverages: water(+60oz) and is  foods and fluids by 30 minutes . Vitamins, minerals, and supplements reviewed with pt. Discussed continuing current regimen taking bariatric chewable w/ B12. Nutrition plan includes: post surgery nutrition goals (6 small meals with protein, adequate fluids, 1/2 cup portions, etc). Patient plan discussed with provider. Educational materials provided: none today.     Nutrition Goals:  1. New smoothie recipe:   1/4 cup fruit, add protein powder, unsweetened almond milk  2. Do not skip snacks-try having a cheese stick, beef/turkey jerky or protein shake  3. Watch portion sizes-do not go over 1/2 cup total per meal/snack    Protein Goal of 60-80gm per day  Fluid Goal of at least 60oz per day      Time spent with patient 8 minutes via telephone visit.     Mary Carrion MBA, RDN, LDN           Hospital Medicine History & Physical      PCP: IN[facility] Ashley Jesisca    Date of Admission: 4/22/2022    Date of Service: Pt seen/examined on 4/22/2022    Chief Complaint:      Chief Complaint   Patient presents with    Abdominal Pain    Urinary Tract Infection       History Of Present Illness:     36-year-old male with past medical history of chronic indwelling Iglesias complicated by recurrent UTIs presented to hospital with abdominal pain. Patient lives at an assisted living facility and a urine culture over the grew multidrug-resistant Proteus. Due to his abdominal pain he was transferred to the hospital.  On arrival patient was noted to be hemodynamically stable. Lab work was fairly unremarkable. Patient was admitted to the hospital for further work-up and management. Past Medical History:        Diagnosis Date    BPH (benign prostatic hyperplasia)     Carotid stenosis 12/2013    JESU 53-79% stenosis; LICA 51-33% stenosis    Cellulitis 12/2013    LLE    CHF [facility]congestive heart failure) ([facility])     Chronic back pain     Diverticular disease     GERD (gastroesophageal reflux disease)     History of atrial fibrillation     Hypercholesteremia     Hypertension     Lower GI bleed     MDRO (multiple drug resistant organisms) resistance 10/26/2019    urine    Neuromuscular disorder ([facility])     spasticity    Renal insufficiency     Vitamin B12 deficiency        Past Surgical History:        Procedure Laterality Date    BACK SURGERY  2006    lower lumbar    CORONARY ARTERY BYPASS GRAFT  12/13/2013    CABG x 5 (Dr Derek Teresa), svg to diag, om1 and 3, distal rca, kelly to lad.      CYSTOSCOPY N/A 1/10/2019    CYSTOSCOPY performed by Antonio Robins MD at [facility] 1690 Right 5/1/2015    ORIF    LEG SURGERY Right 11/2/2021    RIGHT LOWER EXTREMITY ADVANCEMENT FLAP AND SPLIT THICKNESS SKIN GRAFT PLACEMENT; (WOUND- 10 CM X 5.5 CM; CLOSURE- 6 CM X 5.5 CM; SKIN GRAFT- 7.2 CM X 5 CM) Rishi Packer : 1962 Sex: male  Age: 62 y.o. Chief Complaint   Patient presents with    Anxiety     3 months check up, no labs, no health issues shared     Patient is cleared for surgical intervention from a primary care standpoint     Isabelle Cardoso was seen today for anxiety. Diagnoses and all orders for this visit:    Encounter for preoperative examination for general surgical procedure  -     CBC with Auto Differential; Future  -     Comprehensive Metabolic Panel, Fasting; Future  -     Urinalysis; Future    Anxiety    Gastroesophageal reflux disease with esophagitis, unspecified whether hemorrhage    Heart valve disorder    Chronic right SI joint pain    PTSD (post-traumatic stress disorder)    Familial hypercholesterolemia  -     Comprehensive Metabolic Panel, Fasting; Future  -     Lipid Panel; Future    Screening for thyroid disorder  -     TSH; Future          Lab / Imaging Results   (All laboratory and radiology results have been personally reviewed by myself)  Labs:  No results found for this visit on 23. Imaging: All Radiology results interpreted by Radiologist unless otherwise noted. No results found. WAY FORWARD     Educational materials printed for patient's review and were included in patient instructions on his After Visit Summary and given to patient at the end of visit. Counseled regarding above diagnosis, including possible risks and complications,  especially if left uncontrolled. Counseled regarding the possible side effects, risks, benefits and alternatives to treatment; patient and/or guardian verbalizes understanding, agrees, feels comfortable with and wishes to proceed with above treatment plan. Advised patient to call with any new medication issues, and read all Rx info from pharmacy to assure aware of all possible risks and side effects of medication before taking.      Reviewed age and gender appropriate health performed by Esme Shanks MD at [address] [place] [address] Avenue 6/11/2020    SIGMOIDOSCOPY DIAGNOSTIC FLEXIBLE performed by Renetta Santiago MD at [facility] 5/4/2020    EGD BIOPSY performed by Renetta Santiago MD at [facility] ENDOSCOPY       Medications Prior to Admission:    Prior to Admission medications    Medication Sig Start Date End Date Taking? Authorizing Provider   levothyroxine (SYNTHROID) 75 MCG tablet Take 1 tablet by mouth Daily 2/3/22   Gilford Rivet, MD   albuterol sulfate HFA (PROVENTIL HFA) 108 (90 Base) MCG/ACT inhaler Inhale 2 puffs into the lungs every 6 hours as needed for Wheezing or Shortness of Breath 1/20/22   Shivani Oreilly MD   guaiFENesin (MUCINEX) 600 MG extended release tablet Take 1 tablet by mouth 2 times daily as needed for Congestion 1/20/22   Shivani Oreilly MD   ferrous sulfate (IRON 325) 325 (65 Fe) MG tablet Take 1 tablet by mouth 2 times daily 1/20/22   Shivani Oreilly MD   Balsam [place] Oil [facility]) OINT ointment Apply topically daily as needed    Historical Provider, MD   docusate sodium (COLACE) 100 MG capsule Take 100 mg by mouth daily as needed for Constipation    Historical Provider, MD   gabapentin (NEURONTIN) 600 MG tablet Take 600 mg by mouth 2 times daily.  5/13/21   Historical Provider, MD   dicyclomine (BENTYL) 20 MG tablet Take 20 mg by mouth 3 times daily as needed 12/3/20   Historical Provider, MD   vitamin B-12 (CYANOCOBALAMIN) 1000 MCG tablet Take 1,000 mcg by mouth daily    Historical Provider, MD   aspirin 81 MG chewable tablet Take 1 tablet by mouth daily 5/7/20   Miriam White MD   pantoprazole (PROTONIX) 40 MG tablet Take 1 tablet by mouth every morning (before breakfast) 5/6/20   Miriam White MD   tamsulosin (FLOMAX) 0.4 MG capsule Take 0.8 mg by mouth daily     Historical Provider, MD   Melatonin 10 MG TABS Take 10 mg by mouth nightly as needed     Historical Provider, MD   atorvastatin (LIPITOR) 80 MG tablet Take 80 mg by mouth nightly. Historical Provider, MD       Allergies:  Bactrim [sulfamethoxazole-trimethoprim]    Social History:       reports that he quit smoking about 52 years ago. He has never used smokeless tobacco. He reports that he does not drink alcohol and does not use drugs. Family History:  Reviewed in detail and Positive as follows:        Problem Relation Age of Onset    Heart Disease Father        REVIEW OF SYSTEMS:   Positive review  noted in the HPI. All other systems reviewed and negative.     PHYSICAL EXAM:    /61   Pulse 53   Temp 97.9 °F (36.6 °C) (Oral)   Resp 20   Ht 5' 11\" (1.803 m)   Wt 232 lb 12.8 oz (105.6 kg)   SpO2 96%   BMI 32.47 kg/m²   General Appearance: Disheveled appearance, alert and oriented  Skin: warm and dry, no rash or erythema  Head: normocephalic and atraumatic  Eyes: pupils equal, round, and reactive to light, extraocular eye movements intact, conjunctivae normal  ENT: tympanic membrane, external ear and ear canal normal bilaterally, nose without deformity, nasal mucosa and turbinates normal without polyps  Neck: supple and non-tender without mass, no thyromegaly or thyroid nodules, no cervical lymphadenopathy  Pulmonary/Chest: clear to auscultation bilaterally- no wheezes, rales or rhonchi, normal air movement, no respiratory distress  Cardiovascular: normal rate, regular rhythm, normal S1 and S2, no murmurs, rubs, clicks, or gallops, Peripheral pulses good, Cap refill <3 sec, no carotid bruits  Abdomen: soft, non-tender suprapubic tenderness noted, normal bowel sounds, no masses or organomegaly  Extremities: no cyanosis, clubbing or edema  Musculoskeletal: normal range of motion, no joint swelling, deformity or tenderness  Neurologic: reflexes normal and symmetric, no cranial nerve deficit, gait, coordination and speech normal      LABS:      CBC   Recent Labs     04/22/22  1637   WBC 7.4   HGB 11.7*   HCT 37.7*    RENAL  Recent Labs     04/22/22  1637      K 4.6      CO2 23   BUN 27*   CREATININE 0.9     LFT'S  Recent Labs     04/22/22  1637   AST 20   ALT 25   BILITOT <0.2   ALKPHOS 45     COAG  No results for input(s): INR in the last 72 hours. CARDIAC ENZYMES  No results for input(s): CKTOTAL, CKMB, CKMBINDEX, TROPONINI in the last 72 hours. U/A:    Lab Results   Component Value Date    COLORU Yellow 04/22/2022    WBCUA 21-50 04/22/2022    RBCUA 3-4 04/22/2022    MUCUS 1+ 01/26/2022    BACTERIA 4+ 04/22/2022    CLARITYU CLOUDY 04/22/2022    SPECGRAV <=1.005 04/22/2022    LEUKOCYTESUR LARGE 04/22/2022    BLOODU Negative 04/22/2022    GLUCOSEU Negative 04/22/2022    AMORPHOUS 3+ 11/01/2021       ABG    Lab Results   Component Value Date    XYQ3EML 25.0 01/28/2022    BEART 0 01/28/2022    R4NNYNYP 94 01/28/2022    PHART 7.391 01/28/2022    DIH4ZYF 41.1 01/28/2022    PO2ART 72.4 01/28/2022    CLN7EXU 26 01/28/2022       UA:  Recent Labs     04/22/22  1637   COLORU Yellow   PHUR >=9.0*   WBCUA 21-50*   RBCUA 3-4   BACTERIA 4+*   CLARITYU CLOUDY*   SPECGRAV <=1.005   LEUKOCYTESUR LARGE*   UROBILINOGEN 1.0   BILIRUBINUR Negative   BLOODU Negative   GLUCOSEU Negative   KETUA Negative       Microbiology:  No results for input(s): LABGRAM, LABANAE, ORG, CXSURG in the last 72 hours. Nasal Culture: No results for input(s): ORG, MRSAPCR in the last 72 hours. Blood Culture: No results for input(s): BC, BLOODCULT2, ORG in the last 72 hours. Fungal Culture:   No results for input(s): FUNGSM in the last 72 hours. No results for input(s): FUNCXBLD in the last 72 hours. CSF Culture:  No results for input(s): COLORCSF, APPEARCSF, CFTUBE, CLOTCSF, WBCCSF, RBCCSF, NEUTCSF, NUMCELLSCSF, LYMPHSCSF, MONOCSF, GLUCCSF, VOLCSF in the last 72 hours. Respiratory Culture:  No results for input(s): Emogene Razo in the last 72 hours. AFB:No results for input(s): AFBSMEAR in the last 72 hours.   Urine Culture  No results for input(s): LABURIN in the last 72 hours. RADIOLOGY:    No orders to display       Previous medical records personally reviewed and analyzed         PHYSICIAN CERTIFICATION    I certify that Danni Major is expected to be hospitalized for >2 midnights based on the following assessment and plan:    ASSESSMENT/PLAN:  Active Hospital Problems    Diagnosis Date Noted    UTI (urinary tract infection) [N39.0] 04/22/2022     Priority: Medium     Urinary tract infection in a patient with a chronic indwelling Iglesias catheter  - Urine culture at the nursing facility positive for Proteus sensitive to cefepime  - Continue IV cefepime  -Repeat urine culture next    Hypothyroidism  - Continue Synthroid    Peripheral neuropathy  - Continue gabapentin    History of coronary disease  - Continue aspirin, statin    DVT Prophylaxis: Lovenox  Diet: Regular  Code Status: Full  PT/OT Eval Status: Consulted    Dispo -pending clinical course       Mnaju Kate MD  The note was completed using EMR. Every effort was made to ensure accuracy; however, inadvertent computerized transcription errors may be present. Thank you [facility] Turner for the opportunity to be involved in this patient's care. If you have any questions or concerns please feel free to contact me at [phone].

## 2024-01-01 ENCOUNTER — APPOINTMENT (OUTPATIENT)
Dept: CT IMAGING | Age: 84
DRG: 987 | End: 2024-01-01
Payer: MEDICARE

## 2024-01-01 ENCOUNTER — HOSPITAL ENCOUNTER (INPATIENT)
Age: 84
LOS: 7 days | Discharge: HOME | DRG: 987 | End: 2024-01-08
Attending: EMERGENCY MEDICINE | Admitting: FAMILY MEDICINE
Payer: MEDICARE

## 2024-01-01 DIAGNOSIS — R91.8 LUNG MASS: ICD-10-CM

## 2024-01-01 DIAGNOSIS — N39.0 COMPLICATED URINARY TRACT INFECTION: Primary | ICD-10-CM

## 2024-01-01 PROBLEM — A41.9 SEPSIS SECONDARY TO UTI (HCC): Status: ACTIVE | Noted: 2024-01-01

## 2024-01-01 LAB
ALBUMIN SERPL-MCNC: 3.1 G/DL (ref 3.4–5)
ALP SERPL-CCNC: 129 U/L (ref 40–129)
ALT SERPL-CCNC: 18 U/L (ref 10–40)
ANION GAP SERPL CALCULATED.3IONS-SCNC: 12 MMOL/L (ref 3–16)
AST SERPL-CCNC: 19 U/L (ref 15–37)
BACTERIA URNS QL MICRO: ABNORMAL /HPF
BASE EXCESS BLDV CALC-SCNC: 1.6 MMOL/L (ref -2–3)
BASOPHILS # BLD: 0.1 K/UL (ref 0–0.2)
BASOPHILS NFR BLD: 0.6 %
BILIRUB DIRECT SERPL-MCNC: <0.2 MG/DL (ref 0–0.3)
BILIRUB INDIRECT SERPL-MCNC: ABNORMAL MG/DL (ref 0–1)
BILIRUB SERPL-MCNC: 0.3 MG/DL (ref 0–1)
BILIRUB UR QL STRIP.AUTO: NEGATIVE
BUN SERPL-MCNC: 73 MG/DL (ref 7–20)
CALCIUM SERPL-MCNC: 8.3 MG/DL (ref 8.3–10.6)
CHLORIDE SERPL-SCNC: 99 MMOL/L (ref 99–110)
CLARITY UR: CLEAR
CO2 BLDV-SCNC: 29 MMOL/L
CO2 SERPL-SCNC: 23 MMOL/L (ref 21–32)
COHGB MFR BLDV: 0.5 % (ref 0–1.5)
COLOR UR: YELLOW
CREAT SERPL-MCNC: 1 MG/DL (ref 0.8–1.3)
DEPRECATED RDW RBC AUTO: 17.6 % (ref 12.4–15.4)
EOSINOPHIL # BLD: 0.2 K/UL (ref 0–0.6)
EOSINOPHIL NFR BLD: 1.2 %
EPI CELLS #/AREA URNS HPF: ABNORMAL /HPF (ref 0–5)
GFR SERPLBLD CREATININE-BSD FMLA CKD-EPI: >60 ML/MIN/{1.73_M2}
GLUCOSE SERPL-MCNC: 118 MG/DL (ref 70–99)
GLUCOSE UR STRIP.AUTO-MCNC: NEGATIVE MG/DL
HCO3 BLDV-SCNC: 27.2 MMOL/L (ref 24–28)
HCT VFR BLD AUTO: 39.2 % (ref 40.5–52.5)
HGB BLD-MCNC: 12.4 G/DL (ref 13.5–17.5)
HGB UR QL STRIP.AUTO: ABNORMAL
KETONES UR STRIP.AUTO-MCNC: NEGATIVE MG/DL
LACTATE BLDV-SCNC: 0.9 MMOL/L (ref 0.4–1.9)
LEUKOCYTE ESTERASE UR QL STRIP.AUTO: ABNORMAL
LIPASE SERPL-CCNC: 14 U/L (ref 13–60)
LYMPHOCYTES # BLD: 1 K/UL (ref 1–5.1)
LYMPHOCYTES NFR BLD: 7.1 %
MAGNESIUM SERPL-MCNC: 2.6 MG/DL (ref 1.8–2.4)
MCH RBC QN AUTO: 27.2 PG (ref 26–34)
MCHC RBC AUTO-ENTMCNC: 31.8 G/DL (ref 31–36)
MCV RBC AUTO: 85.7 FL (ref 80–100)
METHGB MFR BLDV: 0.2 % (ref 0–1.5)
MONOCYTES # BLD: 0.8 K/UL (ref 0–1.3)
MONOCYTES NFR BLD: 6 %
NEUTROPHILS # BLD: 11.6 K/UL (ref 1.7–7.7)
NEUTROPHILS NFR BLD: 85.1 %
NITRITE UR QL STRIP.AUTO: NEGATIVE
PCO2 BLDV: 45.9 MMHG (ref 41–51)
PH BLDV: 7.38 [PH] (ref 7.35–7.45)
PH UR STRIP.AUTO: 6 [PH] (ref 5–8)
PLATELET # BLD AUTO: 244 K/UL (ref 135–450)
PMV BLD AUTO: 9.1 FL (ref 5–10.5)
PO2 BLDV: 137 MMHG (ref 25–40)
POTASSIUM SERPL-SCNC: 5.2 MMOL/L (ref 3.5–5.1)
PROT SERPL-MCNC: 8.5 G/DL (ref 6.4–8.2)
PROT UR STRIP.AUTO-MCNC: 100 MG/DL
RBC # BLD AUTO: 4.57 M/UL (ref 4.2–5.9)
RBC #/AREA URNS HPF: ABNORMAL /HPF (ref 0–4)
SAO2 % BLDV: 98 %
SODIUM SERPL-SCNC: 134 MMOL/L (ref 136–145)
SP GR UR STRIP.AUTO: 1.01 (ref 1–1.03)
UA DIPSTICK W REFLEX MICRO PNL UR: YES
URN SPEC COLLECT METH UR: ABNORMAL
UROBILINOGEN UR STRIP-ACNC: 0.2 E.U./DL
WBC # BLD AUTO: 13.6 K/UL (ref 4–11)
WBC #/AREA URNS HPF: >100 /HPF (ref 0–5)

## 2024-01-01 PROCEDURE — 83605 ASSAY OF LACTIC ACID: CPT

## 2024-01-01 PROCEDURE — 99223 1ST HOSP IP/OBS HIGH 75: CPT | Performed by: FAMILY MEDICINE

## 2024-01-01 PROCEDURE — 87040 BLOOD CULTURE FOR BACTERIA: CPT

## 2024-01-01 PROCEDURE — 51702 INSERT TEMP BLADDER CATH: CPT

## 2024-01-01 PROCEDURE — 74177 CT ABD & PELVIS W/CONTRAST: CPT

## 2024-01-01 PROCEDURE — 6360000002 HC RX W HCPCS: Performed by: EMERGENCY MEDICINE

## 2024-01-01 PROCEDURE — 80048 BASIC METABOLIC PNL TOTAL CA: CPT

## 2024-01-01 PROCEDURE — 99285 EMERGENCY DEPT VISIT HI MDM: CPT

## 2024-01-01 PROCEDURE — 87086 URINE CULTURE/COLONY COUNT: CPT

## 2024-01-01 PROCEDURE — 82803 BLOOD GASES ANY COMBINATION: CPT

## 2024-01-01 PROCEDURE — 2580000003 HC RX 258: Performed by: EMERGENCY MEDICINE

## 2024-01-01 PROCEDURE — 80076 HEPATIC FUNCTION PANEL: CPT

## 2024-01-01 PROCEDURE — 71250 CT THORAX DX C-: CPT

## 2024-01-01 PROCEDURE — 81001 URINALYSIS AUTO W/SCOPE: CPT

## 2024-01-01 PROCEDURE — 1200000000 HC SEMI PRIVATE

## 2024-01-01 PROCEDURE — 83690 ASSAY OF LIPASE: CPT

## 2024-01-01 PROCEDURE — 94761 N-INVAS EAR/PLS OXIMETRY MLT: CPT

## 2024-01-01 PROCEDURE — 85025 COMPLETE CBC W/AUTO DIFF WBC: CPT

## 2024-01-01 PROCEDURE — 6360000004 HC RX CONTRAST MEDICATION: Performed by: EMERGENCY MEDICINE

## 2024-01-01 PROCEDURE — 83735 ASSAY OF MAGNESIUM: CPT

## 2024-01-01 PROCEDURE — 36415 COLL VENOUS BLD VENIPUNCTURE: CPT

## 2024-01-01 PROCEDURE — 87150 DNA/RNA AMPLIFIED PROBE: CPT

## 2024-01-01 PROCEDURE — 2580000003 HC RX 258: Performed by: FAMILY MEDICINE

## 2024-01-01 RX ORDER — 0.9 % SODIUM CHLORIDE 0.9 %
30 INTRAVENOUS SOLUTION INTRAVENOUS ONCE
Status: COMPLETED | OUTPATIENT
Start: 2024-01-01 | End: 2024-01-01

## 2024-01-01 RX ORDER — SODIUM CHLORIDE 0.9 % (FLUSH) 0.9 %
5-40 SYRINGE (ML) INJECTION EVERY 12 HOURS SCHEDULED
Status: DISCONTINUED | OUTPATIENT
Start: 2024-01-01 | End: 2024-01-09 | Stop reason: HOSPADM

## 2024-01-01 RX ORDER — POTASSIUM CHLORIDE 20 MEQ/1
40 TABLET, EXTENDED RELEASE ORAL PRN
Status: DISCONTINUED | OUTPATIENT
Start: 2024-01-01 | End: 2024-01-09 | Stop reason: HOSPADM

## 2024-01-01 RX ORDER — ATORVASTATIN CALCIUM 80 MG/1
80 TABLET, FILM COATED ORAL NIGHTLY
Status: DISCONTINUED | OUTPATIENT
Start: 2024-01-02 | End: 2024-01-09 | Stop reason: HOSPADM

## 2024-01-01 RX ORDER — PANTOPRAZOLE SODIUM 40 MG/1
40 TABLET, DELAYED RELEASE ORAL
Status: DISCONTINUED | OUTPATIENT
Start: 2024-01-02 | End: 2024-01-09 | Stop reason: HOSPADM

## 2024-01-01 RX ORDER — TRAMADOL HYDROCHLORIDE 50 MG/1
100 TABLET ORAL EVERY 6 HOURS PRN
Status: DISCONTINUED | OUTPATIENT
Start: 2024-01-01 | End: 2024-01-09 | Stop reason: HOSPADM

## 2024-01-01 RX ORDER — ACETAMINOPHEN 650 MG/1
650 SUPPOSITORY RECTAL EVERY 6 HOURS PRN
Status: DISCONTINUED | OUTPATIENT
Start: 2024-01-01 | End: 2024-01-09 | Stop reason: HOSPADM

## 2024-01-01 RX ORDER — ONDANSETRON 4 MG/1
4 TABLET, ORALLY DISINTEGRATING ORAL EVERY 8 HOURS PRN
Status: DISCONTINUED | OUTPATIENT
Start: 2024-01-01 | End: 2024-01-09 | Stop reason: HOSPADM

## 2024-01-01 RX ORDER — SODIUM CHLORIDE, SODIUM LACTATE, POTASSIUM CHLORIDE, CALCIUM CHLORIDE 600; 310; 30; 20 MG/100ML; MG/100ML; MG/100ML; MG/100ML
INJECTION, SOLUTION INTRAVENOUS CONTINUOUS
Status: ACTIVE | OUTPATIENT
Start: 2024-01-01 | End: 2024-01-03

## 2024-01-01 RX ORDER — ACETAMINOPHEN 325 MG/1
650 TABLET ORAL EVERY 6 HOURS PRN
Status: DISCONTINUED | OUTPATIENT
Start: 2024-01-01 | End: 2024-01-09 | Stop reason: HOSPADM

## 2024-01-01 RX ORDER — MAGNESIUM SULFATE IN WATER 40 MG/ML
2000 INJECTION, SOLUTION INTRAVENOUS PRN
Status: DISCONTINUED | OUTPATIENT
Start: 2024-01-01 | End: 2024-01-09 | Stop reason: HOSPADM

## 2024-01-01 RX ORDER — POLYETHYLENE GLYCOL 3350 17 G/17G
17 POWDER, FOR SOLUTION ORAL DAILY PRN
Status: DISCONTINUED | OUTPATIENT
Start: 2024-01-01 | End: 2024-01-09 | Stop reason: HOSPADM

## 2024-01-01 RX ORDER — DOCUSATE SODIUM 100 MG/1
100 CAPSULE, LIQUID FILLED ORAL
Status: DISCONTINUED | OUTPATIENT
Start: 2024-01-02 | End: 2024-01-09 | Stop reason: HOSPADM

## 2024-01-01 RX ORDER — GABAPENTIN 600 MG/1
600 TABLET ORAL 2 TIMES DAILY
Status: DISCONTINUED | OUTPATIENT
Start: 2024-01-02 | End: 2024-01-09 | Stop reason: HOSPADM

## 2024-01-01 RX ORDER — DICYCLOMINE HCL 20 MG
20 TABLET ORAL 3 TIMES DAILY PRN
Status: DISCONTINUED | OUTPATIENT
Start: 2024-01-01 | End: 2024-01-09 | Stop reason: HOSPADM

## 2024-01-01 RX ORDER — LEVOTHYROXINE SODIUM 0.07 MG/1
75 TABLET ORAL DAILY
Status: DISCONTINUED | OUTPATIENT
Start: 2024-01-02 | End: 2024-01-09 | Stop reason: HOSPADM

## 2024-01-01 RX ORDER — SODIUM CHLORIDE 9 MG/ML
INJECTION, SOLUTION INTRAVENOUS PRN
Status: DISCONTINUED | OUTPATIENT
Start: 2024-01-01 | End: 2024-01-09 | Stop reason: HOSPADM

## 2024-01-01 RX ORDER — TRAZODONE HYDROCHLORIDE 100 MG/1
100 TABLET ORAL NIGHTLY
Status: DISCONTINUED | OUTPATIENT
Start: 2024-01-02 | End: 2024-01-09 | Stop reason: HOSPADM

## 2024-01-01 RX ORDER — SODIUM CHLORIDE 0.9 % (FLUSH) 0.9 %
5-40 SYRINGE (ML) INJECTION PRN
Status: DISCONTINUED | OUTPATIENT
Start: 2024-01-01 | End: 2024-01-09 | Stop reason: HOSPADM

## 2024-01-01 RX ORDER — ENOXAPARIN SODIUM 100 MG/ML
40 INJECTION SUBCUTANEOUS DAILY
Status: DISCONTINUED | OUTPATIENT
Start: 2024-01-01 | End: 2024-01-09 | Stop reason: HOSPADM

## 2024-01-01 RX ORDER — POTASSIUM CHLORIDE 7.45 MG/ML
10 INJECTION INTRAVENOUS PRN
Status: DISCONTINUED | OUTPATIENT
Start: 2024-01-01 | End: 2024-01-09 | Stop reason: HOSPADM

## 2024-01-01 RX ORDER — ONDANSETRON 2 MG/ML
4 INJECTION INTRAMUSCULAR; INTRAVENOUS EVERY 6 HOURS PRN
Status: DISCONTINUED | OUTPATIENT
Start: 2024-01-01 | End: 2024-01-09 | Stop reason: HOSPADM

## 2024-01-01 RX ORDER — SODIUM CHLORIDE, SODIUM LACTATE, POTASSIUM CHLORIDE, CALCIUM CHLORIDE 600; 310; 30; 20 MG/100ML; MG/100ML; MG/100ML; MG/100ML
INJECTION, SOLUTION INTRAVENOUS ONCE
Status: COMPLETED | OUTPATIENT
Start: 2024-01-01 | End: 2024-01-01

## 2024-01-01 RX ADMIN — SODIUM CHLORIDE, PRESERVATIVE FREE 10 ML: 5 INJECTION INTRAVENOUS at 22:23

## 2024-01-01 RX ADMIN — SODIUM CHLORIDE, POTASSIUM CHLORIDE, SODIUM LACTATE AND CALCIUM CHLORIDE: 600; 310; 30; 20 INJECTION, SOLUTION INTRAVENOUS at 19:30

## 2024-01-01 RX ADMIN — SODIUM CHLORIDE, POTASSIUM CHLORIDE, SODIUM LACTATE AND CALCIUM CHLORIDE: 600; 310; 30; 20 INJECTION, SOLUTION INTRAVENOUS at 22:13

## 2024-01-01 RX ADMIN — SODIUM CHLORIDE 450 MG: 9 INJECTION, SOLUTION INTRAVENOUS at 19:31

## 2024-01-01 RX ADMIN — IOPAMIDOL 75 ML: 755 INJECTION, SOLUTION INTRAVENOUS at 15:06

## 2024-01-01 RX ADMIN — VANCOMYCIN HYDROCHLORIDE 2250 MG: 1 INJECTION, POWDER, LYOPHILIZED, FOR SOLUTION INTRAVENOUS at 22:10

## 2024-01-01 RX ADMIN — SODIUM CHLORIDE 2700 ML: 9 INJECTION, SOLUTION INTRAVENOUS at 22:15

## 2024-01-01 ASSESSMENT — PAIN - FUNCTIONAL ASSESSMENT
PAIN_FUNCTIONAL_ASSESSMENT: PREVENTS OR INTERFERES SOME ACTIVE ACTIVITIES AND ADLS
PAIN_FUNCTIONAL_ASSESSMENT: 0-10

## 2024-01-01 ASSESSMENT — PAIN DESCRIPTION - PAIN TYPE: TYPE: ACUTE PAIN

## 2024-01-01 ASSESSMENT — PAIN DESCRIPTION - ONSET: ONSET: ON-GOING

## 2024-01-01 ASSESSMENT — PAIN DESCRIPTION - DESCRIPTORS: DESCRIPTORS: ACHING

## 2024-01-01 ASSESSMENT — PAIN DESCRIPTION - FREQUENCY: FREQUENCY: INTERMITTENT

## 2024-01-01 ASSESSMENT — PAIN DESCRIPTION - ORIENTATION: ORIENTATION: LOWER

## 2024-01-01 ASSESSMENT — PAIN SCALES - GENERAL
PAINLEVEL_OUTOF10: 9
PAINLEVEL_OUTOF10: 10

## 2024-01-01 ASSESSMENT — PAIN DESCRIPTION - LOCATION
LOCATION: ABDOMEN
LOCATION: ABDOMEN

## 2024-01-01 NOTE — ED PROVIDER NOTES
(benign prostatic hyperplasia), C. difficile colitis, Carotid stenosis, Cellulitis, Chronic back pain, Encounter for imaging to screen for metal prior to MRI, GERD (gastroesophageal reflux disease), History of atrial fibrillation, Hypertension, Lower GI bleed, MDRO (multiple drug resistant organisms) resistance, Neuromuscular disorder (HCC), Renal insufficiency, Septic arthritis of interphalangeal joint of toe of right foot (HCC), Systolic congestive heart failure (HCC), and Vitamin B12 deficiency.  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  His family history includes Heart Disease in his father.  He reports that he quit smoking about 54 years ago. He has never used smokeless tobacco. He reports that he does not drink alcohol and does not use drugs.    Medications     Previous Medications    ACETAMINOPHEN (TYLENOL) 325 MG TABLET    Take 650 mg by mouth every 6 hours as needed for Pain    ALBUTEROL SULFATE HFA (PROVENTIL HFA) 108 (90 BASE) MCG/ACT INHALER    Inhale 2 puffs into the lungs every 6 hours as needed for Wheezing or Shortness of Breath    ATORVASTATIN (LIPITOR) 80 MG TABLET    Take 80 mg by mouth nightly.    BALSAM PERU-CASTOR OIL (VENELEX) OINT OINTMENT    Apply topically daily as needed    DICYCLOMINE (BENTYL) 20 MG TABLET    Take 20 mg by mouth 3 times daily as needed    DOCUSATE SODIUM (COLACE) 100 MG CAPSULE    Take 100 mg by mouth every morning (before breakfast)    FERROUS SULFATE (IRON 325) 325 (65 FE) MG TABLET    Take 325 mg by mouth in the morning and at bedtime    FUROSEMIDE (LASIX) 40

## 2024-01-02 ENCOUNTER — APPOINTMENT (OUTPATIENT)
Dept: ULTRASOUND IMAGING | Age: 84
DRG: 987 | End: 2024-01-02
Payer: MEDICARE

## 2024-01-02 PROBLEM — J90 LOCULATED PLEURAL EFFUSION: Status: ACTIVE | Noted: 2024-01-02

## 2024-01-02 PROBLEM — J98.11 ROUND ATELECTASIS: Status: ACTIVE | Noted: 2024-01-02

## 2024-01-02 LAB
ALBUMIN SERPL-MCNC: 2.6 G/DL (ref 3.4–5)
ALBUMIN/GLOB SERPL: 0.6 {RATIO} (ref 1.1–2.2)
ALP SERPL-CCNC: 108 U/L (ref 40–129)
ALT SERPL-CCNC: 11 U/L (ref 10–40)
ANION GAP SERPL CALCULATED.3IONS-SCNC: 8 MMOL/L (ref 3–16)
AST SERPL-CCNC: 11 U/L (ref 15–37)
BASOPHILS # BLD: 0 K/UL (ref 0–0.2)
BASOPHILS NFR BLD: 0.6 %
BILIRUB SERPL-MCNC: 0.3 MG/DL (ref 0–1)
BUN SERPL-MCNC: 54 MG/DL (ref 7–20)
CALCIUM SERPL-MCNC: 8.3 MG/DL (ref 8.3–10.6)
CHLORIDE SERPL-SCNC: 107 MMOL/L (ref 99–110)
CO2 SERPL-SCNC: 22 MMOL/L (ref 21–32)
CREAT SERPL-MCNC: 0.7 MG/DL (ref 0.8–1.3)
DEPRECATED RDW RBC AUTO: 17.4 % (ref 12.4–15.4)
EKG ATRIAL RATE: 55 BPM
EKG DIAGNOSIS: NORMAL
EKG P AXIS: 52 DEGREES
EKG P-R INTERVAL: 246 MS
EKG Q-T INTERVAL: 470 MS
EKG QRS DURATION: 90 MS
EKG QTC CALCULATION (BAZETT): 449 MS
EKG R AXIS: 20 DEGREES
EKG T AXIS: 38 DEGREES
EKG VENTRICULAR RATE: 55 BPM
EOSINOPHIL # BLD: 0.5 K/UL (ref 0–0.6)
EOSINOPHIL NFR BLD: 6.1 %
GFR SERPLBLD CREATININE-BSD FMLA CKD-EPI: >60 ML/MIN/{1.73_M2}
GLUCOSE SERPL-MCNC: 92 MG/DL (ref 70–99)
HCT VFR BLD AUTO: 34.4 % (ref 40.5–52.5)
HGB BLD-MCNC: 10.9 G/DL (ref 13.5–17.5)
LACTATE BLDV-SCNC: 0.8 MMOL/L (ref 0.4–1.9)
LYMPHOCYTES # BLD: 1.2 K/UL (ref 1–5.1)
LYMPHOCYTES NFR BLD: 15.5 %
MCH RBC QN AUTO: 27.4 PG (ref 26–34)
MCHC RBC AUTO-ENTMCNC: 31.5 G/DL (ref 31–36)
MCV RBC AUTO: 86.7 FL (ref 80–100)
MONOCYTES # BLD: 0.5 K/UL (ref 0–1.3)
MONOCYTES NFR BLD: 7.2 %
NEUTROPHILS # BLD: 5.4 K/UL (ref 1.7–7.7)
NEUTROPHILS NFR BLD: 70.6 %
PLATELET # BLD AUTO: 205 K/UL (ref 135–450)
PMV BLD AUTO: 8.5 FL (ref 5–10.5)
POTASSIUM SERPL-SCNC: 4.4 MMOL/L (ref 3.5–5.1)
POTASSIUM SERPL-SCNC: 4.4 MMOL/L (ref 3.5–5.1)
PROT SERPL-MCNC: 7 G/DL (ref 6.4–8.2)
RBC # BLD AUTO: 3.97 M/UL (ref 4.2–5.9)
SODIUM SERPL-SCNC: 137 MMOL/L (ref 136–145)
WBC # BLD AUTO: 7.6 K/UL (ref 4–11)

## 2024-01-02 PROCEDURE — 1200000000 HC SEMI PRIVATE

## 2024-01-02 PROCEDURE — 99222 1ST HOSP IP/OBS MODERATE 55: CPT | Performed by: INTERNAL MEDICINE

## 2024-01-02 PROCEDURE — 6370000000 HC RX 637 (ALT 250 FOR IP): Performed by: FAMILY MEDICINE

## 2024-01-02 PROCEDURE — 6370000000 HC RX 637 (ALT 250 FOR IP): Performed by: NURSE PRACTITIONER

## 2024-01-02 PROCEDURE — 93010 ELECTROCARDIOGRAM REPORT: CPT | Performed by: INTERNAL MEDICINE

## 2024-01-02 PROCEDURE — 36415 COLL VENOUS BLD VENIPUNCTURE: CPT

## 2024-01-02 PROCEDURE — 80053 COMPREHEN METABOLIC PANEL: CPT

## 2024-01-02 PROCEDURE — 84132 ASSAY OF SERUM POTASSIUM: CPT

## 2024-01-02 PROCEDURE — 76775 US EXAM ABDO BACK WALL LIM: CPT | Performed by: FAMILY MEDICINE

## 2024-01-02 PROCEDURE — 85025 COMPLETE CBC W/AUTO DIFF WBC: CPT

## 2024-01-02 PROCEDURE — 99223 1ST HOSP IP/OBS HIGH 75: CPT | Performed by: INTERNAL MEDICINE

## 2024-01-02 PROCEDURE — 2580000003 HC RX 258: Performed by: FAMILY MEDICINE

## 2024-01-02 PROCEDURE — 93005 ELECTROCARDIOGRAM TRACING: CPT | Performed by: FAMILY MEDICINE

## 2024-01-02 PROCEDURE — 6360000002 HC RX W HCPCS: Performed by: FAMILY MEDICINE

## 2024-01-02 PROCEDURE — 83605 ASSAY OF LACTIC ACID: CPT

## 2024-01-02 PROCEDURE — 80170 ASSAY OF GENTAMICIN: CPT

## 2024-01-02 RX ORDER — POLYETHYLENE GLYCOL 3350 17 G/17G
17 POWDER, FOR SOLUTION ORAL DAILY
Status: COMPLETED | OUTPATIENT
Start: 2024-01-02 | End: 2024-01-03

## 2024-01-02 RX ADMIN — ATORVASTATIN CALCIUM 80 MG: 80 TABLET, FILM COATED ORAL at 01:06

## 2024-01-02 RX ADMIN — TRAZODONE HYDROCHLORIDE 100 MG: 100 TABLET ORAL at 23:20

## 2024-01-02 RX ADMIN — GABAPENTIN 600 MG: 600 TABLET, FILM COATED ORAL at 01:06

## 2024-01-02 RX ADMIN — VANCOMYCIN HYDROCHLORIDE 1000 MG: 10 INJECTION, POWDER, LYOPHILIZED, FOR SOLUTION INTRAVENOUS at 14:18

## 2024-01-02 RX ADMIN — LEVOTHYROXINE SODIUM 75 MCG: 0.07 TABLET ORAL at 06:12

## 2024-01-02 RX ADMIN — TRAZODONE HYDROCHLORIDE 100 MG: 100 TABLET ORAL at 01:06

## 2024-01-02 RX ADMIN — GABAPENTIN 600 MG: 600 TABLET, FILM COATED ORAL at 23:20

## 2024-01-02 RX ADMIN — POLYETHYLENE GLYCOL 3350 17 G: 17 POWDER, FOR SOLUTION ORAL at 11:22

## 2024-01-02 RX ADMIN — VANCOMYCIN HYDROCHLORIDE 1000 MG: 10 INJECTION, POWDER, LYOPHILIZED, FOR SOLUTION INTRAVENOUS at 23:28

## 2024-01-02 RX ADMIN — GABAPENTIN 600 MG: 600 TABLET, FILM COATED ORAL at 11:22

## 2024-01-02 RX ADMIN — TRAMADOL HYDROCHLORIDE 100 MG: 50 TABLET, COATED ORAL at 11:22

## 2024-01-02 RX ADMIN — DOCUSATE SODIUM 100 MG: 100 CAPSULE, LIQUID FILLED ORAL at 06:12

## 2024-01-02 RX ADMIN — GENTAMICIN SULFATE 388 MG: 40 INJECTION, SOLUTION INTRAMUSCULAR; INTRAVENOUS at 18:24

## 2024-01-02 RX ADMIN — SODIUM CHLORIDE, POTASSIUM CHLORIDE, SODIUM LACTATE AND CALCIUM CHLORIDE: 600; 310; 30; 20 INJECTION, SOLUTION INTRAVENOUS at 07:18

## 2024-01-02 RX ADMIN — PANTOPRAZOLE SODIUM 40 MG: 40 TABLET, DELAYED RELEASE ORAL at 06:12

## 2024-01-02 RX ADMIN — ATORVASTATIN CALCIUM 80 MG: 80 TABLET, FILM COATED ORAL at 23:20

## 2024-01-02 RX ADMIN — SODIUM CHLORIDE, POTASSIUM CHLORIDE, SODIUM LACTATE AND CALCIUM CHLORIDE: 600; 310; 30; 20 INJECTION, SOLUTION INTRAVENOUS at 01:18

## 2024-01-02 RX ADMIN — SODIUM CHLORIDE, POTASSIUM CHLORIDE, SODIUM LACTATE AND CALCIUM CHLORIDE: 600; 310; 30; 20 INJECTION, SOLUTION INTRAVENOUS at 23:21

## 2024-01-02 ASSESSMENT — PAIN DESCRIPTION - ORIENTATION
ORIENTATION: RIGHT;LEFT
ORIENTATION: RIGHT;LEFT

## 2024-01-02 ASSESSMENT — PAIN SCALES - GENERAL
PAINLEVEL_OUTOF10: 2
PAINLEVEL_OUTOF10: 3
PAINLEVEL_OUTOF10: 6

## 2024-01-02 ASSESSMENT — PAIN DESCRIPTION - DESCRIPTORS
DESCRIPTORS: NUMBNESS
DESCRIPTORS: NUMBNESS

## 2024-01-02 ASSESSMENT — PAIN DESCRIPTION - LOCATION
LOCATION: FOOT
LOCATION: FOOT

## 2024-01-02 ASSESSMENT — PAIN DESCRIPTION - FREQUENCY: FREQUENCY: INTERMITTENT

## 2024-01-02 ASSESSMENT — PAIN DESCRIPTION - ONSET: ONSET: ON-GOING

## 2024-01-02 NOTE — ED NOTES
Called up to unit to notify RN that SBAR is ready for review.     Anamaria Smart, ALPHONSO  01/01/24 [id number]    
tab.    Recommendation    Pending orders all ED orders completed.  Pt orders are all inpatient orders.  Plan for Discharge (if known):   Additional Comments: pt is nonambulatory.  Pt has multiple wounds on back and buttocks.  Pt is noted to have a schneider that was replaced today.   If any further questions, please call Sending RN at [phone]    Electronically signed by: Electronically signed by Anamaria Smart RN on 1/1/2024 at 11:00 PM       Anamaria Smart RN  01/01/24 1323

## 2024-01-02 NOTE — H&P
Hospital Medicine History & Physical      Date of Admission: 1/1/2024    Date of Service:  Pt seen/examined on 1/1/2024     [x]Admitted to Inpatient with expected LOS greater than two midnights due to medical therapy.  []Placed in Observation status.    Chief Admission Complaint:  groin pain    Presenting Admission History:      83 y.o. male who presented to [facility] with Sepsis/uti.  PMHx significant for neurogenic bladder chronic catheter with multidrug resistant UTI, hypothyroid.      Symptoms started suddenly today, reports groin pain, pain was alleviated when nursing changed schneider catheter.  Denies fevers/chills, chest pain/pressure, nausea/vomiting.    In the ER  Tachy in the 90s  WBC 13.6  Meets sirs criteria    UA shows blood leukocytes bacteria    Magnesium 2.60    Potasium hemolyzed  Na 134  BUN 73  Glucose 118    CT abd shows  Thickening of the urinary bladder wall is identified with inflammatory changes surrounding the bladder suggestive for severe cystitis evidence of a diverticulum extending off the superior portion of the bladder is identified. There is significant amount of inflammatory change around this diverticulum appearing focus, I cannot exclude a contained bladder perforation. Inflammatory change around this region is present./Fecal impaction of the rectal vault with mild thickening of the bladder wall for which I cannot exclude some element of ischemia./Mild pelvic caliectasis ureteral ectasis bilaterally likely related to narrowing of the ureteral vesicle junctions from bladder wall thickening/Nonobstructive left nephrolithiasis with right renal cortical cyst. No further radiological workup for this is necessary/Increased loculated fluid left lower lung with consolidative changes of adjacent lung parenchyma/cholelithiasis      Assessment/Plan:      Current Principal Problem:  Sepsis secondary to UTI (HCC)    Sepsis/UTI  - history of multidrug resistance  - Vancomycin  - gentamycin  -

## 2024-01-02 NOTE — PLAN OF CARE

## 2024-01-03 LAB
ALBUMIN SERPL-MCNC: 2.7 G/DL (ref 3.4–5)
ALBUMIN/GLOB SERPL: 0.6 {RATIO} (ref 1.1–2.2)
ALP SERPL-CCNC: 105 U/L (ref 40–129)
ALT SERPL-CCNC: 13 U/L (ref 10–40)
ANION GAP SERPL CALCULATED.3IONS-SCNC: 5 MMOL/L (ref 3–16)
AST SERPL-CCNC: 15 U/L (ref 15–37)
BACTERIA UR CULT: NORMAL
BASOPHILS # BLD: 0.1 K/UL (ref 0–0.2)
BASOPHILS NFR BLD: 0.8 %
BILIRUB SERPL-MCNC: 0.3 MG/DL (ref 0–1)
BUN SERPL-MCNC: 31 MG/DL (ref 7–20)
CALCIUM SERPL-MCNC: 8.4 MG/DL (ref 8.3–10.6)
CHLORIDE SERPL-SCNC: 107 MMOL/L (ref 99–110)
CO2 SERPL-SCNC: 24 MMOL/L (ref 21–32)
CREAT SERPL-MCNC: 0.6 MG/DL (ref 0.8–1.3)
DEPRECATED RDW RBC AUTO: 17.4 % (ref 12.4–15.4)
EOSINOPHIL # BLD: 0.6 K/UL (ref 0–0.6)
EOSINOPHIL NFR BLD: 7.7 %
GENTAMICIN RAND EID SERPL-MCNC: NORMAL MG/L
GENTAMICIN RAND EID SERPL-MCNC: NORMAL MG/L
GENTAMICIN TROUGH SERPL-MCNC: 6 UG/ML
GFR SERPLBLD CREATININE-BSD FMLA CKD-EPI: >60 ML/MIN/{1.73_M2}
GLUCOSE SERPL-MCNC: 93 MG/DL (ref 70–99)
HCT VFR BLD AUTO: 33 % (ref 40.5–52.5)
HGB BLD-MCNC: 10.4 G/DL (ref 13.5–17.5)
LYMPHOCYTES # BLD: 1.4 K/UL (ref 1–5.1)
LYMPHOCYTES NFR BLD: 18.1 %
MCH RBC QN AUTO: 27.2 PG (ref 26–34)
MCHC RBC AUTO-ENTMCNC: 31.5 G/DL (ref 31–36)
MCV RBC AUTO: 86.1 FL (ref 80–100)
MONOCYTES # BLD: 0.4 K/UL (ref 0–1.3)
MONOCYTES NFR BLD: 5.9 %
NEUTROPHILS # BLD: 5.1 K/UL (ref 1.7–7.7)
NEUTROPHILS NFR BLD: 67.5 %
PLATELET # BLD AUTO: 222 K/UL (ref 135–450)
PMV BLD AUTO: 8.4 FL (ref 5–10.5)
POTASSIUM SERPL-SCNC: 4.8 MMOL/L (ref 3.5–5.1)
PROT SERPL-MCNC: 7.1 G/DL (ref 6.4–8.2)
RBC # BLD AUTO: 3.84 M/UL (ref 4.2–5.9)
REPORT: NORMAL
SODIUM SERPL-SCNC: 136 MMOL/L (ref 136–145)
VANCOMYCIN SERPL-MCNC: 23.3 UG/ML
WBC # BLD AUTO: 7.5 K/UL (ref 4–11)

## 2024-01-03 PROCEDURE — 2580000003 HC RX 258: Performed by: FAMILY MEDICINE

## 2024-01-03 PROCEDURE — 6370000000 HC RX 637 (ALT 250 FOR IP): Performed by: FAMILY MEDICINE

## 2024-01-03 PROCEDURE — 80202 ASSAY OF VANCOMYCIN: CPT

## 2024-01-03 PROCEDURE — 6360000002 HC RX W HCPCS: Performed by: FAMILY MEDICINE

## 2024-01-03 PROCEDURE — 1200000000 HC SEMI PRIVATE

## 2024-01-03 PROCEDURE — 85025 COMPLETE CBC W/AUTO DIFF WBC: CPT

## 2024-01-03 PROCEDURE — 99232 SBSQ HOSP IP/OBS MODERATE 35: CPT | Performed by: INTERNAL MEDICINE

## 2024-01-03 PROCEDURE — 6370000000 HC RX 637 (ALT 250 FOR IP): Performed by: NURSE PRACTITIONER

## 2024-01-03 PROCEDURE — 80170 ASSAY OF GENTAMICIN: CPT

## 2024-01-03 PROCEDURE — 80053 COMPREHEN METABOLIC PANEL: CPT

## 2024-01-03 PROCEDURE — 36415 COLL VENOUS BLD VENIPUNCTURE: CPT

## 2024-01-03 PROCEDURE — 99233 SBSQ HOSP IP/OBS HIGH 50: CPT | Performed by: INTERNAL MEDICINE

## 2024-01-03 RX ADMIN — GABAPENTIN 600 MG: 600 TABLET, FILM COATED ORAL at 19:59

## 2024-01-03 RX ADMIN — VANCOMYCIN HYDROCHLORIDE 1000 MG: 10 INJECTION, POWDER, LYOPHILIZED, FOR SOLUTION INTRAVENOUS at 11:54

## 2024-01-03 RX ADMIN — SODIUM CHLORIDE, PRESERVATIVE FREE 10 ML: 5 INJECTION INTRAVENOUS at 08:53

## 2024-01-03 RX ADMIN — SODIUM CHLORIDE, POTASSIUM CHLORIDE, SODIUM LACTATE AND CALCIUM CHLORIDE: 600; 310; 30; 20 INJECTION, SOLUTION INTRAVENOUS at 06:05

## 2024-01-03 RX ADMIN — GABAPENTIN 600 MG: 600 TABLET, FILM COATED ORAL at 08:51

## 2024-01-03 RX ADMIN — ATORVASTATIN CALCIUM 80 MG: 80 TABLET, FILM COATED ORAL at 19:59

## 2024-01-03 RX ADMIN — POLYETHYLENE GLYCOL 3350 17 G: 17 POWDER, FOR SOLUTION ORAL at 08:52

## 2024-01-03 RX ADMIN — ENOXAPARIN SODIUM 40 MG: 100 INJECTION SUBCUTANEOUS at 08:49

## 2024-01-03 RX ADMIN — LEVOTHYROXINE SODIUM 75 MCG: 0.07 TABLET ORAL at 06:00

## 2024-01-03 RX ADMIN — DOCUSATE SODIUM 100 MG: 100 CAPSULE, LIQUID FILLED ORAL at 06:00

## 2024-01-03 RX ADMIN — PANTOPRAZOLE SODIUM 40 MG: 40 TABLET, DELAYED RELEASE ORAL at 06:00

## 2024-01-03 RX ADMIN — TRAZODONE HYDROCHLORIDE 100 MG: 100 TABLET ORAL at 19:59

## 2024-01-03 RX ADMIN — SODIUM CHLORIDE, PRESERVATIVE FREE 10 ML: 5 INJECTION INTRAVENOUS at 20:02

## 2024-01-03 RX ADMIN — GENTAMICIN SULFATE 388 MG: 40 INJECTION, SOLUTION INTRAMUSCULAR; INTRAVENOUS at 19:53

## 2024-01-03 NOTE — PLAN OF CARE
Problem: Pain  Goal: Verbalizes/displays adequate comfort level or baseline comfort level  Outcome: Progressing     Problem: Skin/Tissue Integrity  Goal: Absence of new skin breakdown  Description: 1.  Monitor for areas of redness and/or skin breakdown  2.  Assess vascular access sites hourly  3.  Every 4-6 hours minimum:  Change oxygen saturation probe site  4.  Every 4-6 hours:  If on nasal continuous positive airway pressure, respiratory therapy assess nares and determine need for appliance change or resting period.  Outcome: Progressing

## 2024-01-04 ENCOUNTER — ANESTHESIA (OUTPATIENT)
Dept: ENDOSCOPY | Age: 84
End: 2024-01-04
Payer: MEDICARE

## 2024-01-04 ENCOUNTER — APPOINTMENT (OUTPATIENT)
Dept: GENERAL RADIOLOGY | Age: 84
DRG: 987 | End: 2024-01-04
Payer: MEDICARE

## 2024-01-04 ENCOUNTER — ANESTHESIA EVENT (OUTPATIENT)
Dept: ENDOSCOPY | Age: 84
End: 2024-01-04
Payer: MEDICARE

## 2024-01-04 PROBLEM — N30.00 ACUTE CYSTITIS WITHOUT HEMATURIA: Status: ACTIVE | Noted: 2021-02-14

## 2024-01-04 LAB
ALBUMIN SERPL-MCNC: 2.8 G/DL (ref 3.4–5)
ALBUMIN/GLOB SERPL: 0.6 {RATIO} (ref 1.1–2.2)
ALP SERPL-CCNC: 107 U/L (ref 40–129)
ALT SERPL-CCNC: 17 U/L (ref 10–40)
ANION GAP SERPL CALCULATED.3IONS-SCNC: 4 MMOL/L (ref 3–16)
AST SERPL-CCNC: 15 U/L (ref 15–37)
BASOPHILS # BLD: 0.1 K/UL (ref 0–0.2)
BASOPHILS NFR BLD: 1.1 %
BILIRUB SERPL-MCNC: <0.2 MG/DL (ref 0–1)
BUN SERPL-MCNC: 26 MG/DL (ref 7–20)
CALCIUM SERPL-MCNC: 8.2 MG/DL (ref 8.3–10.6)
CHLORIDE SERPL-SCNC: 109 MMOL/L (ref 99–110)
CO2 SERPL-SCNC: 26 MMOL/L (ref 21–32)
CREAT SERPL-MCNC: 0.7 MG/DL (ref 0.8–1.3)
DEPRECATED RDW RBC AUTO: 17.2 % (ref 12.4–15.4)
EOSINOPHIL # BLD: 0.6 K/UL (ref 0–0.6)
EOSINOPHIL NFR BLD: 8 %
GENTAMICIN RAND EID SERPL-MCNC: NORMAL MG/L
GENTAMICIN TROUGH SERPL-MCNC: 7.8 UG/ML
GFR SERPLBLD CREATININE-BSD FMLA CKD-EPI: >60 ML/MIN/{1.73_M2}
GLUCOSE SERPL-MCNC: 100 MG/DL (ref 70–99)
HCT VFR BLD AUTO: 32.7 % (ref 40.5–52.5)
HGB BLD-MCNC: 10.5 G/DL (ref 13.5–17.5)
LYMPHOCYTES # BLD: 1.4 K/UL (ref 1–5.1)
LYMPHOCYTES NFR BLD: 18.2 %
MCH RBC QN AUTO: 27.4 PG (ref 26–34)
MCHC RBC AUTO-ENTMCNC: 32 G/DL (ref 31–36)
MCV RBC AUTO: 85.5 FL (ref 80–100)
MONOCYTES # BLD: 0.5 K/UL (ref 0–1.3)
MONOCYTES NFR BLD: 7.2 %
NEUTROPHILS # BLD: 4.9 K/UL (ref 1.7–7.7)
NEUTROPHILS NFR BLD: 65.5 %
PLATELET # BLD AUTO: 218 K/UL (ref 135–450)
PMV BLD AUTO: 8.5 FL (ref 5–10.5)
POTASSIUM SERPL-SCNC: 4.7 MMOL/L (ref 3.5–5.1)
PROT SERPL-MCNC: 7.4 G/DL (ref 6.4–8.2)
RBC # BLD AUTO: 3.82 M/UL (ref 4.2–5.9)
SODIUM SERPL-SCNC: 139 MMOL/L (ref 136–145)
WBC # BLD AUTO: 7.5 K/UL (ref 4–11)

## 2024-01-04 PROCEDURE — 88112 CYTOPATH CELL ENHANCE TECH: CPT

## 2024-01-04 PROCEDURE — 99232 SBSQ HOSP IP/OBS MODERATE 35: CPT | Performed by: INTERNAL MEDICINE

## 2024-01-04 PROCEDURE — 2500000003 HC RX 250 WO HCPCS: Performed by: NURSE ANESTHETIST, CERTIFIED REGISTERED

## 2024-01-04 PROCEDURE — 2709999900 HC NON-CHARGEABLE SUPPLY: Performed by: INTERNAL MEDICINE

## 2024-01-04 PROCEDURE — 31628 BRONCHOSCOPY/LUNG BX EACH: CPT | Performed by: INTERNAL MEDICINE

## 2024-01-04 PROCEDURE — 88305 TISSUE EXAM BY PATHOLOGIST: CPT

## 2024-01-04 PROCEDURE — 3700000001 HC ADD 15 MINUTES (ANESTHESIA): Performed by: INTERNAL MEDICINE

## 2024-01-04 PROCEDURE — 7100000000 HC PACU RECOVERY - FIRST 15 MIN: Performed by: INTERNAL MEDICINE

## 2024-01-04 PROCEDURE — 2580000003 HC RX 258: Performed by: FAMILY MEDICINE

## 2024-01-04 PROCEDURE — 2720000010 HC SURG SUPPLY STERILE: Performed by: INTERNAL MEDICINE

## 2024-01-04 PROCEDURE — 85025 COMPLETE CBC W/AUTO DIFF WBC: CPT

## 2024-01-04 PROCEDURE — 6360000002 HC RX W HCPCS: Performed by: NURSE ANESTHETIST, CERTIFIED REGISTERED

## 2024-01-04 PROCEDURE — 31623 DX BRONCHOSCOPE/BRUSH: CPT | Performed by: INTERNAL MEDICINE

## 2024-01-04 PROCEDURE — 80053 COMPREHEN METABOLIC PANEL: CPT

## 2024-01-04 PROCEDURE — 88173 CYTOPATH EVAL FNA REPORT: CPT

## 2024-01-04 PROCEDURE — 31624 DX BRONCHOSCOPE/LAVAGE: CPT | Performed by: INTERNAL MEDICINE

## 2024-01-04 PROCEDURE — 0BBJ8ZX EXCISION OF LEFT LOWER LUNG LOBE, VIA NATURAL OR ARTIFICIAL OPENING ENDOSCOPIC, DIAGNOSTIC: ICD-10-PCS | Performed by: INTERNAL MEDICINE

## 2024-01-04 PROCEDURE — 2580000003 HC RX 258: Performed by: NURSE ANESTHETIST, CERTIFIED REGISTERED

## 2024-01-04 PROCEDURE — 1200000000 HC SEMI PRIVATE

## 2024-01-04 PROCEDURE — 31641 BRONCHOSCOPY TREAT BLOCKAGE: CPT | Performed by: INTERNAL MEDICINE

## 2024-01-04 PROCEDURE — 3700000000 HC ANESTHESIA ATTENDED CARE: Performed by: INTERNAL MEDICINE

## 2024-01-04 PROCEDURE — 6370000000 HC RX 637 (ALT 250 FOR IP): Performed by: FAMILY MEDICINE

## 2024-01-04 PROCEDURE — 31625 BRONCHOSCOPY W/BIOPSY(S): CPT | Performed by: INTERNAL MEDICINE

## 2024-01-04 PROCEDURE — 3609011800 HC BRONCHOSCOPY/TRANSBRONCHIAL LUNG BIOPSY: Performed by: INTERNAL MEDICINE

## 2024-01-04 PROCEDURE — 71045 X-RAY EXAM CHEST 1 VIEW: CPT

## 2024-01-04 PROCEDURE — 2580000003 HC RX 258: Performed by: INTERNAL MEDICINE

## 2024-01-04 PROCEDURE — 31629 BRONCHOSCOPY/NEEDLE BX EACH: CPT | Performed by: INTERNAL MEDICINE

## 2024-01-04 PROCEDURE — 0B9J8ZX DRAINAGE OF LEFT LOWER LUNG LOBE, VIA NATURAL OR ARTIFICIAL OPENING ENDOSCOPIC, DIAGNOSTIC: ICD-10-PCS | Performed by: INTERNAL MEDICINE

## 2024-01-04 PROCEDURE — 7100000001 HC PACU RECOVERY - ADDTL 15 MIN: Performed by: INTERNAL MEDICINE

## 2024-01-04 PROCEDURE — 6360000002 HC RX W HCPCS: Performed by: FAMILY MEDICINE

## 2024-01-04 PROCEDURE — 36415 COLL VENOUS BLD VENIPUNCTURE: CPT

## 2024-01-04 RX ORDER — MEPERIDINE HYDROCHLORIDE 25 MG/ML
12.5 INJECTION INTRAMUSCULAR; INTRAVENOUS; SUBCUTANEOUS EVERY 5 MIN PRN
Status: DISCONTINUED | OUTPATIENT
Start: 2024-01-04 | End: 2024-01-04 | Stop reason: HOSPADM

## 2024-01-04 RX ORDER — ONDANSETRON 2 MG/ML
INJECTION INTRAMUSCULAR; INTRAVENOUS PRN
Status: DISCONTINUED | OUTPATIENT
Start: 2024-01-04 | End: 2024-01-04 | Stop reason: SDUPTHER

## 2024-01-04 RX ORDER — PHENYLEPHRINE HYDROCHLORIDE 10 MG/ML
INJECTION INTRAVENOUS PRN
Status: DISCONTINUED | OUTPATIENT
Start: 2024-01-04 | End: 2024-01-04 | Stop reason: SDUPTHER

## 2024-01-04 RX ORDER — HYDRALAZINE HYDROCHLORIDE 20 MG/ML
10 INJECTION INTRAMUSCULAR; INTRAVENOUS
Status: DISCONTINUED | OUTPATIENT
Start: 2024-01-04 | End: 2024-01-04 | Stop reason: HOSPADM

## 2024-01-04 RX ORDER — SODIUM CHLORIDE, SODIUM LACTATE, POTASSIUM CHLORIDE, CALCIUM CHLORIDE 600; 310; 30; 20 MG/100ML; MG/100ML; MG/100ML; MG/100ML
INJECTION, SOLUTION INTRAVENOUS CONTINUOUS PRN
Status: DISCONTINUED | OUTPATIENT
Start: 2024-01-04 | End: 2024-01-04 | Stop reason: SDUPTHER

## 2024-01-04 RX ORDER — DEXAMETHASONE SODIUM PHOSPHATE 4 MG/ML
INJECTION, SOLUTION INTRA-ARTICULAR; INTRALESIONAL; INTRAMUSCULAR; INTRAVENOUS; SOFT TISSUE PRN
Status: DISCONTINUED | OUTPATIENT
Start: 2024-01-04 | End: 2024-01-04 | Stop reason: SDUPTHER

## 2024-01-04 RX ORDER — LABETALOL HYDROCHLORIDE 5 MG/ML
10 INJECTION, SOLUTION INTRAVENOUS
Status: DISCONTINUED | OUTPATIENT
Start: 2024-01-04 | End: 2024-01-04 | Stop reason: HOSPADM

## 2024-01-04 RX ORDER — SODIUM CHLORIDE 9 MG/ML
INJECTION, SOLUTION INTRAVENOUS PRN
Status: DISCONTINUED | OUTPATIENT
Start: 2024-01-04 | End: 2024-01-04 | Stop reason: HOSPADM

## 2024-01-04 RX ORDER — HYDROMORPHONE HYDROCHLORIDE 1 MG/ML
0.5 INJECTION, SOLUTION INTRAMUSCULAR; INTRAVENOUS; SUBCUTANEOUS EVERY 5 MIN PRN
Status: DISCONTINUED | OUTPATIENT
Start: 2024-01-04 | End: 2024-01-04 | Stop reason: HOSPADM

## 2024-01-04 RX ORDER — LIDOCAINE HYDROCHLORIDE 20 MG/ML
INJECTION, SOLUTION INTRAVENOUS PRN
Status: DISCONTINUED | OUTPATIENT
Start: 2024-01-04 | End: 2024-01-04 | Stop reason: SDUPTHER

## 2024-01-04 RX ORDER — EPHEDRINE SULFATE 50 MG/ML
INJECTION INTRAVENOUS PRN
Status: DISCONTINUED | OUTPATIENT
Start: 2024-01-04 | End: 2024-01-04 | Stop reason: SDUPTHER

## 2024-01-04 RX ORDER — SODIUM CHLORIDE, SODIUM LACTATE, POTASSIUM CHLORIDE, CALCIUM CHLORIDE 600; 310; 30; 20 MG/100ML; MG/100ML; MG/100ML; MG/100ML
INJECTION, SOLUTION INTRAVENOUS ONCE
Status: COMPLETED | OUTPATIENT
Start: 2024-01-04 | End: 2024-01-04

## 2024-01-04 RX ORDER — ROCURONIUM BROMIDE 10 MG/ML
INJECTION, SOLUTION INTRAVENOUS PRN
Status: DISCONTINUED | OUTPATIENT
Start: 2024-01-04 | End: 2024-01-04 | Stop reason: SDUPTHER

## 2024-01-04 RX ORDER — SODIUM CHLORIDE 0.9 % (FLUSH) 0.9 %
5-40 SYRINGE (ML) INJECTION PRN
Status: DISCONTINUED | OUTPATIENT
Start: 2024-01-04 | End: 2024-01-04 | Stop reason: HOSPADM

## 2024-01-04 RX ORDER — HYDROMORPHONE HYDROCHLORIDE 1 MG/ML
0.5 INJECTION, SOLUTION INTRAMUSCULAR; INTRAVENOUS; SUBCUTANEOUS EVERY 10 MIN PRN
Status: DISCONTINUED | OUTPATIENT
Start: 2024-01-04 | End: 2024-01-04 | Stop reason: HOSPADM

## 2024-01-04 RX ORDER — DIPHENHYDRAMINE HYDROCHLORIDE 50 MG/ML
12.5 INJECTION INTRAMUSCULAR; INTRAVENOUS
Status: DISCONTINUED | OUTPATIENT
Start: 2024-01-04 | End: 2024-01-04 | Stop reason: HOSPADM

## 2024-01-04 RX ORDER — METOCLOPRAMIDE HYDROCHLORIDE 5 MG/ML
10 INJECTION INTRAMUSCULAR; INTRAVENOUS
Status: DISCONTINUED | OUTPATIENT
Start: 2024-01-04 | End: 2024-01-04 | Stop reason: HOSPADM

## 2024-01-04 RX ORDER — SODIUM CHLORIDE 0.9 % (FLUSH) 0.9 %
5-40 SYRINGE (ML) INJECTION EVERY 12 HOURS SCHEDULED
Status: DISCONTINUED | OUTPATIENT
Start: 2024-01-04 | End: 2024-01-04 | Stop reason: HOSPADM

## 2024-01-04 RX ORDER — PROPOFOL 10 MG/ML
INJECTION, EMULSION INTRAVENOUS PRN
Status: DISCONTINUED | OUTPATIENT
Start: 2024-01-04 | End: 2024-01-04 | Stop reason: SDUPTHER

## 2024-01-04 RX ADMIN — SODIUM CHLORIDE, SODIUM LACTATE, POTASSIUM CHLORIDE, AND CALCIUM CHLORIDE: .6; .31; .03; .02 INJECTION, SOLUTION INTRAVENOUS at 13:02

## 2024-01-04 RX ADMIN — PANTOPRAZOLE SODIUM 40 MG: 40 TABLET, DELAYED RELEASE ORAL at 06:07

## 2024-01-04 RX ADMIN — TRAMADOL HYDROCHLORIDE 100 MG: 50 TABLET, COATED ORAL at 22:44

## 2024-01-04 RX ADMIN — SUGAMMADEX 200 MG: 100 INJECTION, SOLUTION INTRAVENOUS at 13:59

## 2024-01-04 RX ADMIN — SODIUM CHLORIDE, POTASSIUM CHLORIDE, SODIUM LACTATE AND CALCIUM CHLORIDE: 600; 310; 30; 20 INJECTION, SOLUTION INTRAVENOUS at 12:34

## 2024-01-04 RX ADMIN — ATORVASTATIN CALCIUM 80 MG: 80 TABLET, FILM COATED ORAL at 21:00

## 2024-01-04 RX ADMIN — PHENYLEPHRINE HYDROCHLORIDE 100 MCG: 10 INJECTION INTRAVENOUS at 13:24

## 2024-01-04 RX ADMIN — EPHEDRINE SULFATE 10 MG: 50 INJECTION INTRAVENOUS at 13:39

## 2024-01-04 RX ADMIN — EPHEDRINE SULFATE 10 MG: 50 INJECTION INTRAVENOUS at 13:41

## 2024-01-04 RX ADMIN — GABAPENTIN 600 MG: 600 TABLET, FILM COATED ORAL at 07:56

## 2024-01-04 RX ADMIN — EPHEDRINE SULFATE 10 MG: 50 INJECTION INTRAVENOUS at 13:49

## 2024-01-04 RX ADMIN — DEXAMETHASONE SODIUM PHOSPHATE 4 MG: 4 INJECTION, SOLUTION INTRAMUSCULAR; INTRAVENOUS at 13:24

## 2024-01-04 RX ADMIN — PHENYLEPHRINE HYDROCHLORIDE 100 MCG: 10 INJECTION INTRAVENOUS at 13:23

## 2024-01-04 RX ADMIN — VANCOMYCIN HYDROCHLORIDE 750 MG: 10 INJECTION, POWDER, LYOPHILIZED, FOR SOLUTION INTRAVENOUS at 00:26

## 2024-01-04 RX ADMIN — LEVOTHYROXINE SODIUM 75 MCG: 0.07 TABLET ORAL at 06:07

## 2024-01-04 RX ADMIN — PHENYLEPHRINE HYDROCHLORIDE 100 MCG: 10 INJECTION INTRAVENOUS at 13:38

## 2024-01-04 RX ADMIN — SODIUM CHLORIDE, PRESERVATIVE FREE 10 ML: 5 INJECTION INTRAVENOUS at 07:57

## 2024-01-04 RX ADMIN — GABAPENTIN 600 MG: 600 TABLET, FILM COATED ORAL at 21:00

## 2024-01-04 RX ADMIN — VANCOMYCIN HYDROCHLORIDE 750 MG: 10 INJECTION, POWDER, LYOPHILIZED, FOR SOLUTION INTRAVENOUS at 15:15

## 2024-01-04 RX ADMIN — DOCUSATE SODIUM 100 MG: 100 CAPSULE, LIQUID FILLED ORAL at 06:07

## 2024-01-04 RX ADMIN — PROPOFOL 120 MG: 10 INJECTION, EMULSION INTRAVENOUS at 13:10

## 2024-01-04 RX ADMIN — EPHEDRINE SULFATE 10 MG: 50 INJECTION INTRAVENOUS at 13:34

## 2024-01-04 RX ADMIN — ROCURONIUM BROMIDE 40 MG: 10 INJECTION, SOLUTION INTRAVENOUS at 13:10

## 2024-01-04 RX ADMIN — PHENYLEPHRINE HYDROCHLORIDE 100 MCG: 10 INJECTION INTRAVENOUS at 13:43

## 2024-01-04 RX ADMIN — EPHEDRINE SULFATE 10 MG: 50 INJECTION INTRAVENOUS at 13:37

## 2024-01-04 RX ADMIN — LIDOCAINE HYDROCHLORIDE 100 MG: 20 INJECTION, SOLUTION INTRAVENOUS at 13:10

## 2024-01-04 RX ADMIN — PHENYLEPHRINE HYDROCHLORIDE 100 MCG: 10 INJECTION INTRAVENOUS at 13:32

## 2024-01-04 RX ADMIN — SODIUM CHLORIDE, PRESERVATIVE FREE 10 ML: 5 INJECTION INTRAVENOUS at 21:00

## 2024-01-04 RX ADMIN — ONDANSETRON 4 MG: 2 INJECTION INTRAMUSCULAR; INTRAVENOUS at 13:24

## 2024-01-04 RX ADMIN — TRAZODONE HYDROCHLORIDE 100 MG: 100 TABLET ORAL at 21:00

## 2024-01-04 ASSESSMENT — ENCOUNTER SYMPTOMS: SHORTNESS OF BREATH: 1

## 2024-01-04 ASSESSMENT — PAIN SCALES - WONG BAKER
WONGBAKER_NUMERICALRESPONSE: 2
WONGBAKER_NUMERICALRESPONSE: 2

## 2024-01-04 ASSESSMENT — PAIN SCALES - GENERAL
PAINLEVEL_OUTOF10: 2
PAINLEVEL_OUTOF10: 5
PAINLEVEL_OUTOF10: 2

## 2024-01-04 ASSESSMENT — PAIN - FUNCTIONAL ASSESSMENT
PAIN_FUNCTIONAL_ASSESSMENT: ACTIVITIES ARE NOT PREVENTED
PAIN_FUNCTIONAL_ASSESSMENT: NONE - DENIES PAIN

## 2024-01-04 ASSESSMENT — PAIN DESCRIPTION - ORIENTATION: ORIENTATION: RIGHT;LEFT

## 2024-01-04 ASSESSMENT — PAIN DESCRIPTION - LOCATION: LOCATION: FOOT

## 2024-01-04 ASSESSMENT — PAIN DESCRIPTION - DESCRIPTORS: DESCRIPTORS: ACHING

## 2024-01-04 NOTE — PLAN OF CARE
Problem: Discharge Planning  Goal: Discharge to home or other facility with appropriate resources  Outcome: Progressing  Flowsheets (Taken 1/4/2024 0759)  Discharge to home or other facility with appropriate resources:   Identify barriers to discharge with patient and caregiver   Arrange for needed discharge resources and transportation as appropriate     Problem: Pain  Goal: Verbalizes/displays adequate comfort level or baseline comfort level  Outcome: Progressing  Flowsheets (Taken 1/4/2024 0759)  Verbalizes/displays adequate comfort level or baseline comfort level:   Encourage patient to monitor pain and request assistance   Assess pain using appropriate pain scale     Problem: Safety - Adult  Goal: Free from fall injury  Outcome: Progressing  Flowsheets (Taken 1/4/2024 0759)  Free From Fall Injury: Instruct family/caregiver on patient safety     Problem: Skin/Tissue Integrity  Goal: Absence of new skin breakdown  Description: 1.  Monitor for areas of redness and/or skin breakdown  2.  Assess vascular access sites hourly  3.  Every 4-6 hours minimum:  Change oxygen saturation probe site  4.  Every 4-6 hours:  If on nasal continuous positive airway pressure, respiratory therapy assess nares and determine need for appliance change or resting period.  Outcome: Progressing     Problem: ABCDS Injury Assessment  Goal: Absence of physical injury  Outcome: Progressing  Flowsheets (Taken 1/4/2024 0759)  Absence of Physical Injury: Implement safety measures based on patient assessment     Problem: Chronic Conditions and Co-morbidities  Goal: Patient's chronic conditions and co-morbidity symptoms are monitored and maintained or improved  Outcome: Progressing  Flowsheets (Taken 1/4/2024 0759)  Care Plan - Patient's Chronic Conditions and Co-Morbidity Symptoms are Monitored and Maintained or Improved: Monitor and assess patient's chronic conditions and comorbid symptoms for stability, deterioration, or improvement

## 2024-01-04 NOTE — ANESTHESIA PRE PROCEDURE
Department of Anesthesiology  Preprocedure Note       Name:  Tushar Block   Age:  83 y.o.  :  1940                                          MRN:  [id number]         Date:  2024      Surgeon: Surgeon(s):  Nathaniel Celeste MD    Procedure: Procedure(s):  BRONCHOSCOPY FLUOROSCOPY  BRONCHOSCOPY/TRANSBRONCHIAL LUNG BIOPSY    Medications prior to admission:   Prior to Admission medications    Medication Sig Start Date End Date Taking? Authorizing Provider   acetaminophen (TYLENOL) 325 MG tablet Take 2 tablets by mouth every 6 hours as needed for Pain    Gloria Gavin MD   docusate sodium (COLACE) 100 MG capsule Take 1 capsule by mouth every morning (before breakfast)    Gloria Gavin MD   ferrous sulfate (IRON 325) 325 (65 Fe) MG tablet Take 1 tablet by mouth in the morning and at bedtime    Gloria Gavin MD   furosemide (LASIX) 40 MG tablet Take 1 tablet by mouth daily 22   Jamel Ozuna MD   Nutritional Supplements (RONEY PO) Take 1 packet by mouth 2 times daily    Gloria Gavin MD   Nutritional Supplements (ENSURE HIGH PROTEIN) LIQD Take 1 Can by mouth 2 times daily    Gloria Gavin MD   Wound Dressings (MEPILEX EX) Apply topically Cleanse skin graft areas with normal saline, pat dry, apply mepilex foam board, change every 3 days as needed.    Gloria Gavin MD   traZODone (DESYREL) 100 MG tablet Take 1 tablet by mouth nightly    Gloria Gavin MD   loperamide (IMODIUM) 2 MG capsule Take 1 capsule by mouth 4 times daily as needed for Diarrhea    Gloria Gavin MD   Melatonin 5 MG CAPS Take 1 capsule by mouth nightly    Gloria Gavin MD   levothyroxine (SYNTHROID) 75 MCG tablet Take 1 tablet by mouth Daily 2/3/22   Amberly Mckeon MD   albuterol sulfate HFA (PROVENTIL HFA) 108 (90 Base) MCG/ACT inhaler Inhale 2 puffs into the lungs every 6 hours as needed for Wheezing or Shortness of Breath 22   Clarice Galicia MD

## 2024-01-04 NOTE — ANESTHESIA POSTPROCEDURE EVALUATION
Department of Anesthesiology  Postprocedure Note    Patient: Tushar Block  MRN: [id number]  YOB: 1940  Date of evaluation: 1/4/2024    Procedure Summary       Date: 01/04/24 Room / Location: [place] [address] / [facility]    Anesthesia Start: 1301 Anesthesia Stop: 1422    Procedure: BRONCHOSCOPY/TRANSBRONCHIAL LUNG BIOPSY/ CRYOPROBE BX/  BRUSHING /BAL Diagnosis:       Lung mass      (Lung mass [R91.8])    Surgeons: Nathaniel Celeste MD Responsible Provider: Deshawn Hamilton MD    Anesthesia Type: general ASA Status: 3            Anesthesia Type: No value filed.    Twila Phase I: Twila Score: 9    Twila Phase II:      Anesthesia Post Evaluation    Patient location during evaluation: PACU  Patient participation: complete - patient participated  Level of consciousness: awake  Pain score: 0  Nausea & Vomiting: no nausea and no vomiting  Cardiovascular status: blood pressure returned to baseline  Respiratory status: acceptable  Hydration status: euvolemic  Pain management: adequate    No notable events documented.

## 2024-01-04 NOTE — PROCEDURES
Diagnosis:  Left lower lobe lung mass    PROCEDURE: Fiberoptic bronchoscopy with transbronchial biopsies and lavage    DESCRIPTION OF PROCEDURE: Informed consent was obtained from the patient after explaining the risks and benefits. A time out was taken. Total intravenous anesthesia was kindly provided by the anesthetist.     The scope was passed with ease via the ETT.  A complete airway inspection was performed.  No endobronchial lesions were identified. The airways to the LLL were edematous but didn't look abnormal and extrinsically narrowed.   The standard bronchscope was re-inserted passed the vocal cords and into the trachea via the ETT.     -  Transbronchial brushings were obtained for biopsy in the region LLL  -  A bronchoalveolar lavage was obtained from the LLL  -  Transbronchial forceps biopsies were obtained from LLL   -  1 Sean endobronchial needle biopsy was performed at a secondary ange in the left lower lobe  -  A Sean transbronchial needle biopsy was performed in the LLL  -  The cryo probe was used to perform additional transbronchial biopses of his LLL    EBL: 15-20 mL    The patient tolerated the procedure well. Recovery will be per the anesthesia team.         Nathaniel Celeste MD   .

## 2024-01-04 NOTE — PLAN OF CARE
Palliative Care Chart Review  and Check in Note:     NAME:  Tushar Block  Admit Date: 1/1/2024  Hospital Day:  Hospital Day: 4   Current Code status: Full Code    Palliative care is continuing to following Mr. Block for symptom management,  and goals of care discussion as needed. Patient's chart reviewed today 1/4/24.        The following are the currently established goals/code status, and Symptom management.     Goals of care: Tushar still wants to continue current management, I discussed with him that he was in hospice before admission and was told that hospice was seeing him for wound care, Code status was discussed and was a full code prior to admission wants to be a full code.He has an understanding of the current treatments in the hospital and would like to be admitted to the hospital if medically needed.     Called \"[facility] hospice\" discussed about Tushar.   As per [facility] Tushar was discharged from the hospice yesterday and was a full code while he was with hospice service.     Code status: Full    Discharge plan: NBA Jimenez MD, PGY-3  01/04/24  11:36 AM

## 2024-01-05 LAB
ALBUMIN SERPL-MCNC: 2.8 G/DL (ref 3.4–5)
ALBUMIN/GLOB SERPL: 0.7 {RATIO} (ref 1.1–2.2)
ALP SERPL-CCNC: 105 U/L (ref 40–129)
ALT SERPL-CCNC: 13 U/L (ref 10–40)
ANION GAP SERPL CALCULATED.3IONS-SCNC: 6 MMOL/L (ref 3–16)
AST SERPL-CCNC: 12 U/L (ref 15–37)
BASOPHILS # BLD: 0 K/UL (ref 0–0.2)
BASOPHILS NFR BLD: 0.3 %
BILIRUB SERPL-MCNC: <0.2 MG/DL (ref 0–1)
BUN SERPL-MCNC: 22 MG/DL (ref 7–20)
CALCIUM SERPL-MCNC: 8.1 MG/DL (ref 8.3–10.6)
CHLORIDE SERPL-SCNC: 109 MMOL/L (ref 99–110)
CO2 SERPL-SCNC: 24 MMOL/L (ref 21–32)
CREAT SERPL-MCNC: 0.6 MG/DL (ref 0.8–1.3)
DEPRECATED RDW RBC AUTO: 17.4 % (ref 12.4–15.4)
EOSINOPHIL # BLD: 0 K/UL (ref 0–0.6)
EOSINOPHIL NFR BLD: 0 %
GFR SERPLBLD CREATININE-BSD FMLA CKD-EPI: >60 ML/MIN/{1.73_M2}
GLUCOSE SERPL-MCNC: 127 MG/DL (ref 70–99)
HCT VFR BLD AUTO: 30.5 % (ref 40.5–52.5)
HGB BLD-MCNC: 9.7 G/DL (ref 13.5–17.5)
LYMPHOCYTES # BLD: 0.9 K/UL (ref 1–5.1)
LYMPHOCYTES NFR BLD: 13.8 %
MCH RBC QN AUTO: 27.8 PG (ref 26–34)
MCHC RBC AUTO-ENTMCNC: 31.8 G/DL (ref 31–36)
MCV RBC AUTO: 87.5 FL (ref 80–100)
MONOCYTES # BLD: 0.4 K/UL (ref 0–1.3)
MONOCYTES NFR BLD: 6 %
NEUTROPHILS # BLD: 5.1 K/UL (ref 1.7–7.7)
NEUTROPHILS NFR BLD: 79.9 %
PLATELET # BLD AUTO: 192 K/UL (ref 135–450)
PMV BLD AUTO: 8.2 FL (ref 5–10.5)
POTASSIUM SERPL-SCNC: 5.3 MMOL/L (ref 3.5–5.1)
PROT SERPL-MCNC: 7 G/DL (ref 6.4–8.2)
RBC # BLD AUTO: 3.49 M/UL (ref 4.2–5.9)
SODIUM SERPL-SCNC: 139 MMOL/L (ref 136–145)
WBC # BLD AUTO: 6.3 K/UL (ref 4–11)

## 2024-01-05 PROCEDURE — 1200000000 HC SEMI PRIVATE

## 2024-01-05 PROCEDURE — 2580000003 HC RX 258: Performed by: FAMILY MEDICINE

## 2024-01-05 PROCEDURE — 36415 COLL VENOUS BLD VENIPUNCTURE: CPT

## 2024-01-05 PROCEDURE — 6360000002 HC RX W HCPCS: Performed by: FAMILY MEDICINE

## 2024-01-05 PROCEDURE — 87040 BLOOD CULTURE FOR BACTERIA: CPT

## 2024-01-05 PROCEDURE — 99232 SBSQ HOSP IP/OBS MODERATE 35: CPT | Performed by: INTERNAL MEDICINE

## 2024-01-05 PROCEDURE — 85025 COMPLETE CBC W/AUTO DIFF WBC: CPT

## 2024-01-05 PROCEDURE — 80053 COMPREHEN METABOLIC PANEL: CPT

## 2024-01-05 PROCEDURE — 6370000000 HC RX 637 (ALT 250 FOR IP): Performed by: FAMILY MEDICINE

## 2024-01-05 RX ADMIN — DOCUSATE SODIUM 100 MG: 100 CAPSULE, LIQUID FILLED ORAL at 08:44

## 2024-01-05 RX ADMIN — PANTOPRAZOLE SODIUM 40 MG: 40 TABLET, DELAYED RELEASE ORAL at 08:44

## 2024-01-05 RX ADMIN — GABAPENTIN 600 MG: 600 TABLET, FILM COATED ORAL at 20:43

## 2024-01-05 RX ADMIN — LEVOTHYROXINE SODIUM 75 MCG: 0.07 TABLET ORAL at 08:44

## 2024-01-05 RX ADMIN — VANCOMYCIN HYDROCHLORIDE 750 MG: 10 INJECTION, POWDER, LYOPHILIZED, FOR SOLUTION INTRAVENOUS at 00:37

## 2024-01-05 RX ADMIN — SODIUM CHLORIDE, PRESERVATIVE FREE 10 ML: 5 INJECTION INTRAVENOUS at 08:45

## 2024-01-05 RX ADMIN — TRAMADOL HYDROCHLORIDE 100 MG: 50 TABLET, COATED ORAL at 20:43

## 2024-01-05 RX ADMIN — GABAPENTIN 600 MG: 600 TABLET, FILM COATED ORAL at 08:44

## 2024-01-05 RX ADMIN — ATORVASTATIN CALCIUM 80 MG: 80 TABLET, FILM COATED ORAL at 20:43

## 2024-01-05 RX ADMIN — SODIUM CHLORIDE, PRESERVATIVE FREE 10 ML: 5 INJECTION INTRAVENOUS at 08:44

## 2024-01-05 RX ADMIN — TRAZODONE HYDROCHLORIDE 100 MG: 100 TABLET ORAL at 20:42

## 2024-01-05 ASSESSMENT — PAIN DESCRIPTION - LOCATION: LOCATION: BACK;SACRUM

## 2024-01-05 ASSESSMENT — PAIN SCALES - GENERAL
PAINLEVEL_OUTOF10: 2
PAINLEVEL_OUTOF10: 4
PAINLEVEL_OUTOF10: 5

## 2024-01-05 ASSESSMENT — PAIN DESCRIPTION - PAIN TYPE: TYPE: ACUTE PAIN

## 2024-01-05 ASSESSMENT — PAIN DESCRIPTION - FREQUENCY: FREQUENCY: INTERMITTENT

## 2024-01-05 ASSESSMENT — PAIN DESCRIPTION - ONSET: ONSET: GRADUAL

## 2024-01-05 ASSESSMENT — PAIN - FUNCTIONAL ASSESSMENT: PAIN_FUNCTIONAL_ASSESSMENT: ACTIVITIES ARE NOT PREVENTED

## 2024-01-05 ASSESSMENT — PAIN DESCRIPTION - DESCRIPTORS: DESCRIPTORS: ACHING

## 2024-01-05 ASSESSMENT — PAIN DESCRIPTION - ORIENTATION: ORIENTATION: MID;LOWER

## 2024-01-05 NOTE — DISCHARGE SUMMARY
PM     Urine Cultures:   Lab Results   Component Value Date/Time    LABURIN No growth at 18 to 36 hours 01/01/2024 05:36 PM     Blood Cultures:   Lab Results   Component Value Date/Time    BC  01/01/2024 07:36 PM     Gram stain Aerobic bottle:  Gram positive cocci in clusters  resembling Staphylococcus  Information to follow      BC POSITIVE for 01/01/2024 07:36 PM     Lab Results   Component Value Date/Time    BLOODCULT2  01/01/2024 07:36 PM     Gram stain Aerobic bottle:  Gram positive cocci in clusters  resembling Staphylococcus  Information to follow      BLOODCULT2 See additional report for complete BCID panel. 01/01/2024 07:36 PM    BLOODCULT2 POSITIVE for 01/01/2024 07:36 PM     Organism:   Lab Results   Component Value Date/Time    ORG Staphylococcus epidermidis 01/01/2024 07:36 PM    ORG Staphylococcus epidermidis DNA Detected 01/01/2024 07:36 PM    ORG Staphylococcus epidermidis 01/01/2024 07:36 PM       Time Spent Discharging patient >31 minutes    Electronically signed by TIM Atkins CNP on 1/5/2024 at 2:25 PM

## 2024-01-05 NOTE — DISCHARGE INSTR - COC
1124   Number of days: 3       Wound 01/02/24 Buttocks combination of pressure & shearing (Active)   Wound Image   01/02/24 1114   Wound Etiology Pressure Stage 2 01/05/24 1124   Dressing Status Intact;Clean;Dry 01/05/24 1124   Wound Cleansed Cleansed with saline 01/05/24 1124   Dressing/Treatment Triad hydro/zinc oxide-based hydrophilic paste;Foam 01/03/24 1108   Dressing Change Due 01/02/24 01/02/24 1114   Wound Assessment Bleeding;Pink/red 01/05/24 1124   Drainage Amount Small (< 25%) 01/05/24 1124   Drainage Description Sanguinous 01/05/24 1124   Odor None 01/05/24 1124   Usha-wound Assessment Fragile;Intact 01/05/24 1124   Margins Attached edges;Defined edges 01/02/24 1114   Number of days: 3       Wound 01/02/24 Back (Active)   Wound Image   01/02/24 1114   Wound Etiology Other 01/05/24 1124   Dressing Status Clean;Dry;Intact 01/05/24 1124   Wound Cleansed Cleansed with saline 01/03/24 1108   Dressing/Treatment Triad hydro/zinc oxide-based hydrophilic paste;Foam 01/03/24 1108   Dressing Change Due 01/02/24 01/02/24 1114   Wound Assessment Bleeding;Pink/red 01/02/24 1114   Drainage Amount Scant (moist but unmeasurable) 01/02/24 1114   Drainage Description Sanguinous 01/02/24 1114   Odor None 01/02/24 1114   Usha-wound Assessment Dry/flaky;Intact 01/02/24 1114   Margins Attached edges;Defined edges 01/02/24 1114   Wound Thickness Description not for Pressure Injury Partial thickness 01/02/24 1114   Number of days: 3       Wound 01/02/24 Leg Right;Lower (Active)   Wound Image   01/02/24 1114   Wound Etiology Venous 01/05/24 1124   Dressing Status Clean;Dry;Intact 01/05/24 1124   Wound Cleansed Cleansed with saline 01/03/24 1108   Dressing/Treatment Ace wrap 01/05/24 1124   Offloading for Diabetic Foot Ulcers Offloading boot 01/05/24 1124   Dressing Change Due 01/03/24 01/02/24 1114   Wound Length (cm) 1.5 cm 01/02/24 1114   Wound Width (cm) 2.2 cm 01/02/24 1114   Wound Depth (cm) 0.2 cm 01/02/24 1114   Wound

## 2024-01-05 NOTE — CONSULTS
Clinical Pharmacy Progress Note    Vancomycin & Gentamicin has been discontinued by ID. Will sign off pharmacy to dose Vancomycin & Gentamicin consult.  If medication is restarted and pharmacy is to manage dosing, please re-consult at that time.    Please call with questions--  Thanks--  Roxie Lugo, PearlD, BCPS, [facility]  [id number] ([facility])   1/5/2024 10:56 AM      
Clinical Pharmacy Progress Note    Vancomycin and Gentamicin - Management by Pharmacy    Consult Date(s): 01/01/24  Consulting Provider(s): Rios Cheatham    Assessment / Plan  UTI - Vancomycin  Concurrent Antimicrobials: gentamycin  Day of Vanc Therapy / Ordered Duration: day 1 of 5  Current Dosing Method: Bayesian-Guided AUC Dosing  Therapeutic Goal: -600 mg/L*hr  Current Dose / Plan:   Vancomycin 2250 mg (25 mg/kg) x 1 dose ordered  Vancomycin 1500 mg q24h ordered ~ predicted  mg/L*hr and 12.5 mg/L  Will continue to monitor clinical condition and make adjustments to regimen as appropriate.  UTI - Aminoglycoside (Urban-Julián Nomogram)  Concurrent Antimicrobials: vancomycin  Day of 1 Therapy / Ordered Duration: 5 days  Current Dosing Method: Extended-Interval  Therapeutic Goal: Peak: YANCY 8-12   Current Dose / Plan:  Gentamicin 450 mg  x 1 dose given  Gentamicin 388 mg (5 mg/kg IBW) q24h ordered  Random level ordered 01/02 0730 (12h post initial dose)  Will continue to monitor clinical condition and make adjustments to regimen as appropriate.    Thank you for consulting pharmacy,    Radha Escamilla, PharmD, [facility] 1/1/2024 9:25 PM        Interval update:  presenting with lower abdominal pain, hx of drug resistant organisms, osteomyelitis in foot, indwelling schneider catheter. Was on ceftin prior to admission.     Subjective/Objective:   Tushar Block is a 83 y.o. male with a PMHx significant for osteomyelitis, HTN, GERD, CAD, CKD who is admitted with sepsis secondary to UTI.     Pharmacy is consulted to dose vancomycin and gentamicin.    Ht Readings from Last 1 Encounters:   01/01/24 1.829 m (6')     Wt Readings from Last 1 Encounters:   01/01/24 90 kg (198 lb 6.4 oz)     Current & Prior Antimicrobial Regimen(s):  Gentamicin (01/01 - current)  Vancomycin (01/01 - current)    Vancomycin and Gentamicin Level(s) / Doses:    Date Time Dose Type of Level / Level Interpretation                 Note: Serum levels 
Infectious Diseases   Consult Note      Reason for Consult: mdr UTI   Requesting Physician: Dr. Anderson     Date of Admission: 1/1/2024  Subjective:   CHIEF COMPLAINT: chest pain    HPI:  83-year-old male with history of BPH, C. difficile, GERD, HTN, CKD, CHF, neurogenic bladder with chronic catheter use presented on 1/1 with groin pain, sepsis.  He also reported some pain in the tip of his penis.  Patient's groin pain was relieved with Iglesias catheter exchange.  He states this was due to be exchanged on 1/6. patient denied any fevers or chills, no CVA tenderness.  In the ER he was noted to be tachycardic with WBC of 13.6.  CT of the abdomen showed thickening of the urinary bladder with inflammatory changes surrounding the bladder suggestive of severe cystitis and bladder diverticulum.  There was also noted loculated fluid in the left lower lung with consolidative changes.    he was started on vancomycin and gentamicin IV.  Urology evaluation is pending.  Last urine culture from [facility] dated 9/2021 showed growth of Enterococcus faecalis and MDR Morganella Morganii.                      Current abx: vanco, gentamicin          Past Surgical History:       Diagnosis Date    BPH (benign prostatic hyperplasia)     C. 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 
AM    GFRAA >60 10/11/2022 05:52 PM    LABGLOM >60 01/02/2024 05:18 AM     PT/INR:    Lab Results   Component Value Date/Time    PROTIME 16.0 11/03/2022 03:28 PM    INR 1.29 11/03/2022 03:28 PM     PTT:    Lab Results   Component Value Date/Time    APTT 36.4 10/11/2022 05:52 PM   [APTT    Urinalysis:    Latest Reference Range & Units 01/01/24 17:42   Color, UA Straw/Yellow  Yellow   Clarity, UA Clear  Clear   Glucose, UA Negative mg/dL Negative   Bilirubin, Urine Negative  Negative   Ketones, Urine Negative mg/dL Negative   Specific Gravity, UA 1.005 - 1.030  1.015   Blood, Urine Negative  MODERATE !   pH, UA 5.0 - 8.0  6.0   Protein, UA Negative mg/dL 100 !   Urobilinogen, Urine <2.0 E.U./dL 0.2   Nitrite, Urine Negative  Negative   Leukocyte Esterase, Urine Negative  MODERATE !   Urine Type  Voided   WBC, UA 0 - 5 /HPF >100 !   RBC, UA 0 - 4 /HPF 11-20 !   Epithelial Cells, UA 0 - 5 /HPF 0-1   Bacteria, UA None Seen /HPF 2+ !   Microscopic Examination  YES (C)   !: Data is abnormal  (C): Corrected    Urine Culture: pending      Antibiotic Therapy: vancomycin, gentamicin    Imaging:   IMPRESSION:  1. Thickening of the urinary bladder wall is identified with inflammatory changes surrounding the bladder suggestive for severe cystitis evidence of a diverticulum extending off the superior portion of the bladder is identified. There is significant   amount of inflammatory change around this diverticulum appearing focus, I cannot exclude a contained bladder perforation. Inflammatory change around this region is present.  2. Fecal impaction of the rectal vault with mild thickening of the bladder wall for which I cannot exclude some element of ischemia.  3. Mild pelvic caliectasis ureteral ectasis bilaterally likely related to narrowing of the ureteral vesicle junctions from bladder wall thickening  4. Nonobstructive left nephrolithiasis with right renal cortical cyst. No further radiological workup for this is 
Disease Father        REVIEW OF SYSTEMS:    CONSTITUTIONAL:  negative for fevers, chills, diaphoresis, activity change, appetite change, fatigue, night sweats and unexpected weight change.   EYES:  negative for blurred vision, eye discharge, visual disturbance and icterus  HEENT:  negative for hearing loss, tinnitus, ear drainage, sinus pressure, nasal congestion, epistaxis and snoring  RESPIRATORY:  See HPI  CARDIOVASCULAR:  negative for chest pain, palpitations, exertional chest pressure/discomfort, edema, syncope  GASTROINTESTINAL:  negative for nausea, vomiting, diarrhea, constipation, blood in stool and abdominal pain  GENITOURINARY: Chronic indwelling Iglesias   HEMATOLOGIC/LYMPHATIC:  negative for easy bruising, bleeding and lymphadenopathy  ALLERGIC/IMMUNOLOGIC:  negative for recurrent infections, angioedema, anaphylaxis and drug reactions  ENDOCRINE:  negative for weight changes and diabetic symptoms including polyuria, polydipsia and polyphagia  MUSCULOSKELETAL:  negative for  pain, joint swelling, decreased range of motion and muscle weakness  NEUROLOGICAL:  negative for headaches, slurred speech, unilateral weakness  PSYCHIATRIC/BEHAVIORAL: negative for hallucinations, behavioral problems, confusion and agitation.     Objective:   PHYSICAL EXAM:      VITALS:  /67   Pulse 70   Temp 97.4 °F (36.3 °C) (Oral)   Resp 16   Ht 1.829 m (6')   Wt 90 kg (198 lb 6.4 oz)   SpO2 92%   BMI 26.91 kg/m²   24HR INTAKE/OUTPUT:    Intake/Output Summary (Last 24 hours) at 2024 1736  Last data filed at 2024 0614  Gross per 24 hour   Intake 1250 ml   Output 550 ml   Net 700 ml     CURRENT PULSE OXIMETRY:  SpO2: 92 %  24HR PULSE OXIMETRY RANGE:  SpO2  Av.8 %  Min: 92 %  Max: 95 % on room air    CONSTITUTIONAL:  awake, alert, cooperative, no distress, and appears stated age  NECK:  Supple, symmetrical, trachea midline, no adenopathy, thyroid symmetric, not enlarged and no tenderness, skin normal  LUNGS: 
facility-administered medications on file prior to encounter.     Current Outpatient Medications on File Prior to Encounter   Medication Sig Dispense Refill    acetaminophen (TYLENOL) 325 MG tablet Take 2 tablets by mouth every 6 hours as needed for Pain      docusate sodium (COLACE) 100 MG capsule Take 1 capsule by mouth every morning (before breakfast)      ferrous sulfate (IRON 325) 325 (65 Fe) MG tablet Take 1 tablet by mouth in the morning and at bedtime      furosemide (LASIX) 40 MG tablet Take 1 tablet by mouth daily 60 tablet 3    Nutritional Supplements (RONEY PO) Take 1 packet by mouth 2 times daily      Nutritional Supplements (ENSURE HIGH PROTEIN) LIQD Take 1 Can by mouth 2 times daily      Wound Dressings (MEPILEX EX) Apply topically Cleanse skin graft areas with normal saline, pat dry, apply mepilex foam board, change every 3 days as needed.      traZODone (DESYREL) 100 MG tablet Take 1 tablet by mouth nightly      loperamide (IMODIUM) 2 MG capsule Take 1 capsule by mouth 4 times daily as needed for Diarrhea      Melatonin 5 MG CAPS Take 1 capsule by mouth nightly      levothyroxine (SYNTHROID) 75 MCG tablet Take 1 tablet by mouth Daily 30 tablet 3    albuterol sulfate HFA (PROVENTIL HFA) 108 (90 Base) MCG/ACT inhaler Inhale 2 puffs into the lungs every 6 hours as needed for Wheezing or Shortness of Breath 18 g 3    guaiFENesin (MUCINEX) 600 MG extended release tablet Take 1 tablet by mouth 2 times daily as needed for Congestion 60 tablet 2    Balsam [place]-Castor Oil (VENELEX) OINT ointment Apply topically daily as needed      gabapentin (NEURONTIN) 600 MG tablet Take 1 tablet by mouth 2 times daily.      dicyclomine (BENTYL) 20 MG tablet Take 1 tablet by mouth 3 times daily as needed      vitamin B-12 (CYANOCOBALAMIN) 1000 MCG tablet Take 1 tablet by mouth daily      pantoprazole (PROTONIX) 40 MG tablet Take 1 tablet by mouth every morning (before breakfast) 30 tablet 3    [DISCONTINUED] aspirin 81 MG 
disease; Can't do hobbies/housework; intake normal or reduced; occasional assist; LOC full/confusion  [] 50% Mainly sit/lie; Extensive disease; Can't do any work; Considerable assist; intake normal  Or reduced; LOC full/confusion  [] 40% Mainly in bed; Extensive disease; Mainly assist; intake normal or reduced; occasional assist; LOC full/confusion  [] 30% Bed Bound; Extensive disease; Total care; intake reduced; LOC full/confusion  [] 20% Bed Bound; Extensive disease; Total care; intake minimal; Drowsy/coma  [] 10% Bed Bound; Extensive disease; Total care; Mouth care only; Drowsy/coma  [] 0% Death    PPS:     Vitals:    /73   Pulse 65   Temp 97.8 °F (36.6 °C) (Oral)   Resp 16   Ht 1.829 m (6')   Wt 94 kg (207 lb 3.7 oz)   SpO2 92%   BMI 28.11 kg/m²     Labs:    BMP:   Recent Labs     01/01/24  1424 01/02/24  0518   * 137   K 5.2* 4.4  4.4   CL 99 107   CO2 23 22   BUN 73* 54*   CREATININE 1.0 0.7*   GLUCOSE 118* 92     CBC:   Recent Labs     01/01/24  1424 01/02/24  0518 01/03/24  0855   WBC 13.6* 7.6 7.5   HGB 12.4* 10.9* 10.4*   HCT 39.2* 34.4* 33.0*    205 222       LFT's:   Recent Labs     01/01/24  1424 01/02/24  0518   AST 19 11*   ALT 18 11   BILITOT 0.3 0.3   ALKPHOS 129 108     Troponin: No results for input(s): \"TROPONINI\" in the last 72 hours.  BNP: No results for input(s): \"BNP\" in the last 72 hours.  ABGs: No results for input(s): \"PHART\", \"VKD0KSI\", \"PO2ART\" in the last 72 hours.  INR: No results for input(s): \"INR\" in the last 72 hours.    U/A:  Recent Labs     01/01/24  1742   COLORU Yellow   PHUR 6.0   WBCUA >100*   RBCUA 11-20*   BACTERIA 2+*   CLARITYU Clear   SPECGRAV 1.015   LEUKOCYTESUR MODERATE*   UROBILINOGEN 0.2   BILIRUBINUR Negative   BLOODU MODERATE*   GLUCOSEU Negative       US URINARY BLADDER LIMITED   Final Result      Iglesias catheter within decompressed bladder.      If evaluation for bladder perforation is indicated then CT is required.      CT CHEST WO

## 2024-01-06 LAB
ALBUMIN SERPL-MCNC: 3 G/DL (ref 3.4–5)
ALBUMIN/GLOB SERPL: 0.6 {RATIO} (ref 1.1–2.2)
ALP SERPL-CCNC: 123 U/L (ref 40–129)
ALT SERPL-CCNC: 13 U/L (ref 10–40)
ANION GAP SERPL CALCULATED.3IONS-SCNC: 6 MMOL/L (ref 3–16)
AST SERPL-CCNC: 13 U/L (ref 15–37)
BACTERIA BLD CULT ORG #2: ABNORMAL
BACTERIA BLD CULT ORG #2: ABNORMAL
BACTERIA BLD CULT: ABNORMAL
BASOPHILS # BLD: 0.1 K/UL (ref 0–0.2)
BASOPHILS NFR BLD: 1 %
BILIRUB SERPL-MCNC: 0.3 MG/DL (ref 0–1)
BUN SERPL-MCNC: 20 MG/DL (ref 7–20)
CALCIUM SERPL-MCNC: 8.6 MG/DL (ref 8.3–10.6)
CHLORIDE SERPL-SCNC: 105 MMOL/L (ref 99–110)
CO2 SERPL-SCNC: 26 MMOL/L (ref 21–32)
CREAT SERPL-MCNC: 0.8 MG/DL (ref 0.8–1.3)
DEPRECATED RDW RBC AUTO: 17.2 % (ref 12.4–15.4)
EOSINOPHIL # BLD: 0.5 K/UL (ref 0–0.6)
EOSINOPHIL NFR BLD: 6.9 %
GFR SERPLBLD CREATININE-BSD FMLA CKD-EPI: >60 ML/MIN/{1.73_M2}
GLUCOSE SERPL-MCNC: 80 MG/DL (ref 70–99)
HCT VFR BLD AUTO: 33.9 % (ref 40.5–52.5)
HGB BLD-MCNC: 10.7 G/DL (ref 13.5–17.5)
LYMPHOCYTES # BLD: 1.6 K/UL (ref 1–5.1)
LYMPHOCYTES NFR BLD: 20.5 %
MCH RBC QN AUTO: 26.9 PG (ref 26–34)
MCHC RBC AUTO-ENTMCNC: 31.7 G/DL (ref 31–36)
MCV RBC AUTO: 84.7 FL (ref 80–100)
MONOCYTES # BLD: 0.5 K/UL (ref 0–1.3)
MONOCYTES NFR BLD: 5.9 %
NEUTROPHILS # BLD: 5.1 K/UL (ref 1.7–7.7)
NEUTROPHILS NFR BLD: 65.7 %
ORGANISM: ABNORMAL
PLATELET # BLD AUTO: 247 K/UL (ref 135–450)
PMV BLD AUTO: 8.3 FL (ref 5–10.5)
POTASSIUM SERPL-SCNC: 4.8 MMOL/L (ref 3.5–5.1)
PROT SERPL-MCNC: 7.8 G/DL (ref 6.4–8.2)
RBC # BLD AUTO: 4 M/UL (ref 4.2–5.9)
SODIUM SERPL-SCNC: 137 MMOL/L (ref 136–145)
WBC # BLD AUTO: 7.8 K/UL (ref 4–11)

## 2024-01-06 PROCEDURE — 2580000003 HC RX 258: Performed by: FAMILY MEDICINE

## 2024-01-06 PROCEDURE — 6370000000 HC RX 637 (ALT 250 FOR IP): Performed by: FAMILY MEDICINE

## 2024-01-06 PROCEDURE — 85025 COMPLETE CBC W/AUTO DIFF WBC: CPT

## 2024-01-06 PROCEDURE — 36415 COLL VENOUS BLD VENIPUNCTURE: CPT

## 2024-01-06 PROCEDURE — 80053 COMPREHEN METABOLIC PANEL: CPT

## 2024-01-06 PROCEDURE — 1200000000 HC SEMI PRIVATE

## 2024-01-06 RX ADMIN — SODIUM CHLORIDE, PRESERVATIVE FREE 10 ML: 5 INJECTION INTRAVENOUS at 09:12

## 2024-01-06 RX ADMIN — ACETAMINOPHEN 650 MG: 325 TABLET ORAL at 23:17

## 2024-01-06 RX ADMIN — GABAPENTIN 600 MG: 600 TABLET, FILM COATED ORAL at 09:12

## 2024-01-06 RX ADMIN — DOCUSATE SODIUM 100 MG: 100 CAPSULE, LIQUID FILLED ORAL at 06:03

## 2024-01-06 RX ADMIN — TRAZODONE HYDROCHLORIDE 100 MG: 100 TABLET ORAL at 20:31

## 2024-01-06 RX ADMIN — SODIUM CHLORIDE, PRESERVATIVE FREE 10 ML: 5 INJECTION INTRAVENOUS at 20:33

## 2024-01-06 RX ADMIN — ATORVASTATIN CALCIUM 80 MG: 80 TABLET, FILM COATED ORAL at 20:31

## 2024-01-06 RX ADMIN — LEVOTHYROXINE SODIUM 75 MCG: 0.07 TABLET ORAL at 06:03

## 2024-01-06 RX ADMIN — TRAMADOL HYDROCHLORIDE 100 MG: 50 TABLET, COATED ORAL at 20:31

## 2024-01-06 RX ADMIN — GABAPENTIN 600 MG: 600 TABLET, FILM COATED ORAL at 20:31

## 2024-01-06 RX ADMIN — PANTOPRAZOLE SODIUM 40 MG: 40 TABLET, DELAYED RELEASE ORAL at 06:03

## 2024-01-06 ASSESSMENT — PAIN DESCRIPTION - LOCATION
LOCATION: LEG
LOCATION: FOOT
LOCATION: LEG

## 2024-01-06 ASSESSMENT — PAIN DESCRIPTION - FREQUENCY
FREQUENCY: INTERMITTENT
FREQUENCY: CONTINUOUS
FREQUENCY: INTERMITTENT

## 2024-01-06 ASSESSMENT — PAIN DESCRIPTION - PAIN TYPE
TYPE: CHRONIC PAIN

## 2024-01-06 ASSESSMENT — PAIN DESCRIPTION - ORIENTATION
ORIENTATION: RIGHT;LEFT
ORIENTATION: RIGHT
ORIENTATION: RIGHT;LOWER

## 2024-01-06 ASSESSMENT — PAIN SCALES - GENERAL
PAINLEVEL_OUTOF10: 3
PAINLEVEL_OUTOF10: 6
PAINLEVEL_OUTOF10: 6
PAINLEVEL_OUTOF10: 1

## 2024-01-06 ASSESSMENT — PAIN DESCRIPTION - DESCRIPTORS
DESCRIPTORS: SORE
DESCRIPTORS: ACHING
DESCRIPTORS: ACHING

## 2024-01-06 ASSESSMENT — PAIN - FUNCTIONAL ASSESSMENT: PAIN_FUNCTIONAL_ASSESSMENT: ACTIVITIES ARE NOT PREVENTED

## 2024-01-06 ASSESSMENT — PAIN DESCRIPTION - ONSET
ONSET: GRADUAL
ONSET: ON-GOING
ONSET: GRADUAL

## 2024-01-06 NOTE — PLAN OF CARE
Problem: Discharge Planning  Goal: Discharge to home or other facility with appropriate resources  Outcome: Adequate for Discharge     Problem: Pain  Goal: Verbalizes/displays adequate comfort level or baseline comfort level  Outcome: Adequate for Discharge  Flowsheets (Taken 1/6/2024 6071)  Verbalizes/displays adequate comfort level or baseline comfort level:   Encourage patient to monitor pain and request assistance   Assess pain using appropriate pain scale   Administer analgesics based on type and severity of pain and evaluate response   Implement non-pharmacological measures as appropriate and evaluate response   Consider cultural and social influences on pain and pain management   Notify Licensed Independent Practitioner if interventions unsuccessful or patient reports new pain     Problem: Safety - Adult  Goal: Free from fall injury  Outcome: Adequate for Discharge  Flowsheets (Taken 1/6/2024 8638)  Free From Fall Injury: Instruct family/caregiver on patient safety     Problem: Skin/Tissue Integrity  Goal: Absence of new skin breakdown  Description: 1.  Monitor for areas of redness and/or skin breakdown  2.  Assess vascular access sites hourly  3.  Every 4-6 hours minimum:  Change oxygen saturation probe site  4.  Every 4-6 hours:  If on nasal continuous positive airway pressure, respiratory therapy assess nares and determine need for appliance change or resting period.  Outcome: Adequate for Discharge     Problem: ABCDS Injury Assessment  Goal: Absence of physical injury  Outcome: Adequate for Discharge  Flowsheets (Taken 1/6/2024 1758)  Absence of Physical Injury: Implement safety measures based on patient assessment     Problem: Chronic Conditions and Co-morbidities  Goal: Patient's chronic conditions and co-morbidity symptoms are monitored and maintained or improved  Outcome: Adequate for Discharge     Problem: Nutrition Deficit:  Goal: Optimize nutritional status  Outcome: Adequate for Discharge

## 2024-01-07 LAB
ALBUMIN SERPL-MCNC: 2.5 G/DL (ref 3.4–5)
ALBUMIN/GLOB SERPL: 0.7 {RATIO} (ref 1.1–2.2)
ALP SERPL-CCNC: 100 U/L (ref 40–129)
ALT SERPL-CCNC: 10 U/L (ref 10–40)
ANION GAP SERPL CALCULATED.3IONS-SCNC: 7 MMOL/L (ref 3–16)
AST SERPL-CCNC: 8 U/L (ref 15–37)
BASOPHILS # BLD: 0 K/UL (ref 0–0.2)
BASOPHILS NFR BLD: 0.3 %
BILIRUB SERPL-MCNC: <0.2 MG/DL (ref 0–1)
BUN SERPL-MCNC: 23 MG/DL (ref 7–20)
CALCIUM SERPL-MCNC: 7.8 MG/DL (ref 8.3–10.6)
CHLORIDE SERPL-SCNC: 108 MMOL/L (ref 99–110)
CO2 SERPL-SCNC: 25 MMOL/L (ref 21–32)
CREAT SERPL-MCNC: 1 MG/DL (ref 0.8–1.3)
DEPRECATED RDW RBC AUTO: 17.1 % (ref 12.4–15.4)
EOSINOPHIL # BLD: 0.7 K/UL (ref 0–0.6)
EOSINOPHIL NFR BLD: 10 %
GFR SERPLBLD CREATININE-BSD FMLA CKD-EPI: >60 ML/MIN/{1.73_M2}
GLUCOSE SERPL-MCNC: 90 MG/DL (ref 70–99)
HCT VFR BLD AUTO: 28.8 % (ref 40.5–52.5)
HGB BLD-MCNC: 9.2 G/DL (ref 13.5–17.5)
LYMPHOCYTES # BLD: 1.6 K/UL (ref 1–5.1)
LYMPHOCYTES NFR BLD: 22.4 %
MCH RBC QN AUTO: 27.2 PG (ref 26–34)
MCHC RBC AUTO-ENTMCNC: 32 G/DL (ref 31–36)
MCV RBC AUTO: 85.2 FL (ref 80–100)
MONOCYTES # BLD: 0.6 K/UL (ref 0–1.3)
MONOCYTES NFR BLD: 8.9 %
NEUTROPHILS # BLD: 4.2 K/UL (ref 1.7–7.7)
NEUTROPHILS NFR BLD: 58.4 %
PLATELET # BLD AUTO: 206 K/UL (ref 135–450)
PMV BLD AUTO: 8.2 FL (ref 5–10.5)
POTASSIUM SERPL-SCNC: 5 MMOL/L (ref 3.5–5.1)
PROT SERPL-MCNC: 6.2 G/DL (ref 6.4–8.2)
RBC # BLD AUTO: 3.38 M/UL (ref 4.2–5.9)
SODIUM SERPL-SCNC: 140 MMOL/L (ref 136–145)
WBC # BLD AUTO: 7.2 K/UL (ref 4–11)

## 2024-01-07 PROCEDURE — 6370000000 HC RX 637 (ALT 250 FOR IP): Performed by: FAMILY MEDICINE

## 2024-01-07 PROCEDURE — 36415 COLL VENOUS BLD VENIPUNCTURE: CPT

## 2024-01-07 PROCEDURE — 1200000000 HC SEMI PRIVATE

## 2024-01-07 PROCEDURE — 85025 COMPLETE CBC W/AUTO DIFF WBC: CPT

## 2024-01-07 PROCEDURE — 80053 COMPREHEN METABOLIC PANEL: CPT

## 2024-01-07 PROCEDURE — 2580000003 HC RX 258: Performed by: FAMILY MEDICINE

## 2024-01-07 RX ADMIN — DOCUSATE SODIUM 100 MG: 100 CAPSULE, LIQUID FILLED ORAL at 05:41

## 2024-01-07 RX ADMIN — GABAPENTIN 600 MG: 600 TABLET, FILM COATED ORAL at 20:49

## 2024-01-07 RX ADMIN — GABAPENTIN 600 MG: 600 TABLET, FILM COATED ORAL at 09:13

## 2024-01-07 RX ADMIN — SODIUM CHLORIDE, PRESERVATIVE FREE 10 ML: 5 INJECTION INTRAVENOUS at 20:50

## 2024-01-07 RX ADMIN — SODIUM CHLORIDE, PRESERVATIVE FREE 10 ML: 5 INJECTION INTRAVENOUS at 09:13

## 2024-01-07 RX ADMIN — ATORVASTATIN CALCIUM 80 MG: 80 TABLET, FILM COATED ORAL at 20:49

## 2024-01-07 RX ADMIN — TRAZODONE HYDROCHLORIDE 100 MG: 100 TABLET ORAL at 20:49

## 2024-01-07 RX ADMIN — PANTOPRAZOLE SODIUM 40 MG: 40 TABLET, DELAYED RELEASE ORAL at 05:42

## 2024-01-07 RX ADMIN — LEVOTHYROXINE SODIUM 75 MCG: 0.07 TABLET ORAL at 05:42

## 2024-01-07 ASSESSMENT — PAIN SCALES - GENERAL
PAINLEVEL_OUTOF10: 4
PAINLEVEL_OUTOF10: 3
PAINLEVEL_OUTOF10: 5

## 2024-01-07 ASSESSMENT — PAIN DESCRIPTION - FREQUENCY
FREQUENCY: CONTINUOUS
FREQUENCY: CONTINUOUS

## 2024-01-07 ASSESSMENT — PAIN DESCRIPTION - ORIENTATION
ORIENTATION: RIGHT;LEFT
ORIENTATION: LEFT;RIGHT

## 2024-01-07 ASSESSMENT — PAIN DESCRIPTION - PAIN TYPE
TYPE: CHRONIC PAIN
TYPE: CHRONIC PAIN

## 2024-01-07 ASSESSMENT — PAIN DESCRIPTION - LOCATION
LOCATION: FOOT
LOCATION: FOOT

## 2024-01-07 ASSESSMENT — PAIN - FUNCTIONAL ASSESSMENT: PAIN_FUNCTIONAL_ASSESSMENT: ACTIVITIES ARE NOT PREVENTED

## 2024-01-07 ASSESSMENT — PAIN DESCRIPTION - ONSET
ONSET: ON-GOING
ONSET: ON-GOING

## 2024-01-07 ASSESSMENT — PAIN DESCRIPTION - DESCRIPTORS
DESCRIPTORS: ACHING
DESCRIPTORS: TINGLING;ACHING

## 2024-01-07 NOTE — PLAN OF CARE
Problem: Safety - Adult  Goal: Free from fall injury  1/7/2024 0451 by Iva Chilel RN  Outcome: Progressing  1/6/2024 1759 by Yulissa Lyn RN  Outcome: Adequate for Discharge  Flowsheets (Taken 1/6/2024 1758)  Free From Fall Injury: Instruct family/caregiver on patient safety     Problem: Skin/Tissue Integrity  Goal: Absence of new skin breakdown  Description: 1.  Monitor for areas of redness and/or skin breakdown  2.  Assess vascular access sites hourly  3.  Every 4-6 hours minimum:  Change oxygen saturation probe site  4.  Every 4-6 hours:  If on nasal continuous positive airway pressure, respiratory therapy assess nares and determine need for appliance change or resting period.  1/7/2024 0451 by Iva Chilel RN  Outcome: Progressing  1/6/2024 1759 by Yulissa Lyn RN  Outcome: Adequate for Discharge     Problem: ABCDS Injury Assessment  Goal: Absence of physical injury  1/7/2024 0451 by Iva Chilel RN  Outcome: Progressing  1/6/2024 1759 by Yulissa Lyn RN  Outcome: Adequate for Discharge  Flowsheets (Taken 1/6/2024 1758)  Absence of Physical Injury: Implement safety measures based on patient assessment

## 2024-01-08 VITALS
DIASTOLIC BLOOD PRESSURE: 77 MMHG | HEIGHT: 72 IN | HEART RATE: 90 BPM | OXYGEN SATURATION: 97 % | BODY MASS INDEX: 28.85 KG/M2 | TEMPERATURE: 99.4 F | SYSTOLIC BLOOD PRESSURE: 162 MMHG | RESPIRATION RATE: 16 BRPM | WEIGHT: 212.96 LBS

## 2024-01-08 LAB
ALBUMIN SERPL-MCNC: 2.2 G/DL (ref 3.4–5)
ALBUMIN/GLOB SERPL: 0.6 {RATIO} (ref 1.1–2.2)
ALP SERPL-CCNC: 97 U/L (ref 40–129)
ALT SERPL-CCNC: 8 U/L (ref 10–40)
ANION GAP SERPL CALCULATED.3IONS-SCNC: 8 MMOL/L (ref 3–16)
AST SERPL-CCNC: 9 U/L (ref 15–37)
BASOPHILS # BLD: 0.1 K/UL (ref 0–0.2)
BASOPHILS NFR BLD: 1 %
BILIRUB SERPL-MCNC: <0.2 MG/DL (ref 0–1)
BUN SERPL-MCNC: 27 MG/DL (ref 7–20)
CALCIUM SERPL-MCNC: 7.5 MG/DL (ref 8.3–10.6)
CHLORIDE SERPL-SCNC: 109 MMOL/L (ref 99–110)
CO2 SERPL-SCNC: 23 MMOL/L (ref 21–32)
CREAT SERPL-MCNC: 1 MG/DL (ref 0.8–1.3)
DEPRECATED RDW RBC AUTO: 16.7 % (ref 12.4–15.4)
EOSINOPHIL # BLD: 0.7 K/UL (ref 0–0.6)
EOSINOPHIL NFR BLD: 9.2 %
GFR SERPLBLD CREATININE-BSD FMLA CKD-EPI: >60 ML/MIN/{1.73_M2}
GLUCOSE SERPL-MCNC: 87 MG/DL (ref 70–99)
HCT VFR BLD AUTO: 26.5 % (ref 40.5–52.5)
HGB BLD-MCNC: 8.6 G/DL (ref 13.5–17.5)
LYMPHOCYTES # BLD: 1.5 K/UL (ref 1–5.1)
LYMPHOCYTES NFR BLD: 21.1 %
MCH RBC QN AUTO: 27.5 PG (ref 26–34)
MCHC RBC AUTO-ENTMCNC: 32.6 G/DL (ref 31–36)
MCV RBC AUTO: 84.5 FL (ref 80–100)
MONOCYTES # BLD: 0.6 K/UL (ref 0–1.3)
MONOCYTES NFR BLD: 9 %
NEUTROPHILS # BLD: 4.3 K/UL (ref 1.7–7.7)
NEUTROPHILS NFR BLD: 59.7 %
PLATELET # BLD AUTO: 188 K/UL (ref 135–450)
PMV BLD AUTO: 8.7 FL (ref 5–10.5)
POTASSIUM SERPL-SCNC: 4.9 MMOL/L (ref 3.5–5.1)
PROT SERPL-MCNC: 5.9 G/DL (ref 6.4–8.2)
RBC # BLD AUTO: 3.14 M/UL (ref 4.2–5.9)
SODIUM SERPL-SCNC: 140 MMOL/L (ref 136–145)
WBC # BLD AUTO: 7.2 K/UL (ref 4–11)

## 2024-01-08 PROCEDURE — 1200000000 HC SEMI PRIVATE

## 2024-01-08 PROCEDURE — 36415 COLL VENOUS BLD VENIPUNCTURE: CPT

## 2024-01-08 PROCEDURE — 85025 COMPLETE CBC W/AUTO DIFF WBC: CPT

## 2024-01-08 PROCEDURE — 2580000003 HC RX 258: Performed by: FAMILY MEDICINE

## 2024-01-08 PROCEDURE — 6370000000 HC RX 637 (ALT 250 FOR IP): Performed by: FAMILY MEDICINE

## 2024-01-08 PROCEDURE — 80053 COMPREHEN METABOLIC PANEL: CPT

## 2024-01-08 RX ADMIN — TRAMADOL HYDROCHLORIDE 100 MG: 50 TABLET, COATED ORAL at 20:02

## 2024-01-08 RX ADMIN — GABAPENTIN 600 MG: 600 TABLET, FILM COATED ORAL at 08:09

## 2024-01-08 RX ADMIN — SODIUM CHLORIDE, PRESERVATIVE FREE 10 ML: 5 INJECTION INTRAVENOUS at 08:09

## 2024-01-08 RX ADMIN — LEVOTHYROXINE SODIUM 75 MCG: 0.07 TABLET ORAL at 05:17

## 2024-01-08 RX ADMIN — GABAPENTIN 600 MG: 600 TABLET, FILM COATED ORAL at 20:02

## 2024-01-08 RX ADMIN — ATORVASTATIN CALCIUM 80 MG: 80 TABLET, FILM COATED ORAL at 20:02

## 2024-01-08 RX ADMIN — PANTOPRAZOLE SODIUM 40 MG: 40 TABLET, DELAYED RELEASE ORAL at 05:17

## 2024-01-08 RX ADMIN — TRAZODONE HYDROCHLORIDE 100 MG: 100 TABLET ORAL at 20:02

## 2024-01-08 RX ADMIN — DOCUSATE SODIUM 100 MG: 100 CAPSULE, LIQUID FILLED ORAL at 05:17

## 2024-01-08 ASSESSMENT — PAIN DESCRIPTION - PAIN TYPE
TYPE: CHRONIC PAIN
TYPE: CHRONIC PAIN

## 2024-01-08 ASSESSMENT — PAIN DESCRIPTION - FREQUENCY
FREQUENCY: CONTINUOUS
FREQUENCY: CONTINUOUS

## 2024-01-08 ASSESSMENT — PAIN DESCRIPTION - LOCATION
LOCATION: LEG
LOCATION: FOOT

## 2024-01-08 ASSESSMENT — PAIN DESCRIPTION - ONSET
ONSET: ON-GOING
ONSET: ON-GOING

## 2024-01-08 ASSESSMENT — PAIN SCALES - GENERAL
PAINLEVEL_OUTOF10: 5
PAINLEVEL_OUTOF10: 5
PAINLEVEL_OUTOF10: 0

## 2024-01-08 ASSESSMENT — PAIN DESCRIPTION - DESCRIPTORS
DESCRIPTORS: ACHING
DESCRIPTORS: ACHING

## 2024-01-08 ASSESSMENT — PAIN DESCRIPTION - ORIENTATION
ORIENTATION: RIGHT;LEFT
ORIENTATION: RIGHT;LEFT

## 2024-01-08 NOTE — PLAN OF CARE
Problem: Safety - Adult  Goal: Free from fall injury  Outcome: Progressing  Flowsheets (Taken 1/7/2024 1715 by Yulissa Lyn RN)  Free From Fall Injury: Instruct family/caregiver on patient safety     Problem: Skin/Tissue Integrity  Goal: Absence of new skin breakdown  Description: 1.  Monitor for areas of redness and/or skin breakdown  2.  Assess vascular access sites hourly  3.  Every 4-6 hours minimum:  Change oxygen saturation probe site  4.  Every 4-6 hours:  If on nasal continuous positive airway pressure, respiratory therapy assess nares and determine need for appliance change or resting period.  Outcome: Progressing     Problem: Discharge Planning  Goal: Discharge to home or other facility with appropriate resources  Outcome: Progressing

## 2024-01-08 NOTE — CARE COORDINATION
Appeal to discharge was denied per Garry and patient had until noon today to d/c before incurring charges. [facility] was not able to get DME to the room until after noon today and transport was changed to 1400. The patient still declined to leave today and was willing to pay $3500 OOP to stay until tomorrow. I explained that he would then owe $7000 tomorrow as it is $3500 per day. He has decided to leave [facility] but asked to be transported at 11 PM which I am working on.     Electronically signed by Nayla Moncada RN on 1/8/2024 at 1:18 PM  [phone]  
BAYRON met with Mr. Crawford at the bedside to discuss plans for discharge. He told his nurse he received a call from someone at [employer] telling him his discharge as upheld and he needed to leave by noon 1/8/24. He stated he would be willing to leave in the morning. Advised he will have financial responsibility for his stay after noon. He expressed understanding. No  notification has been faxed to [facility] Case Management as of this date/time.    He will return to his assisted living facility [facility] with [facility] services.      Susanna Colby, RN  [phone]  
Case Management Assessment  Initial Evaluation    Date/Time of Evaluation: 1/2/2024 5:31 PM  Assessment Completed by: Nayla Moncada RN    If patient is discharged prior to next notation, then this note serves as note for discharge by case management.    Patient Name: Tushar Block                   YOB: 1940  Diagnosis: Complicated urinary tract infection [N39.0]  Sepsis secondary to UTI (HCC) [A41.9, N39.0]                   Date / Time: 1/1/2024  1:13 PM    Patient Admission Status: Inpatient   Readmission Risk (Low < 19, Mod (19-27), High > 27): Readmission Risk Score: 17.2    Current PCP: Mario Alberto Tran  PCP verified by CM?      Chart Reviewed: Yes      History Provided by:    Patient Orientation:      Patient Cognition:      Hospitalization in the last 30 days (Readmission):  No    If yes, Readmission Assessment in CM Navigator will be completed.    Advance Directives:      Code Status: Full Code   Patient's Primary Decision Maker is:      Primary Decision Maker: Mckayla Jourdan - Child - [phone]    Primary Decision Maker: Mitsi Block - Child - [phone]    Discharge Planning:    Patient lives with: Alone Type of Home: Skilled Nursing Facility  Primary Care Giver:    Patient Support Systems include: Family Members   Current Financial resources:    Current community resources:    Current services prior to admission: None            Current DME:              Type of Home Care services:  None    ADLS  Prior functional level:    Current functional level:      PT AM-PAC:   /24  OT AM-PAC:   /24    Family can provide assistance at DC:    Would you like Case Management to discuss the discharge plan with any other family members/significant others, and if so, who?    Plans to Return to Present Housing:    Other Identified Issues/Barriers to RETURNING to current housing: TBD  Potential Assistance needed at discharge: N/A            Potential DME:    Patient expects to discharge to: 
[facility] appeal received [id number], Clinical, IMM/DND uploaded teofilo the [employer] Portal 1/5/24 1805.  Pending [facility] decision.    Electronically signed by Estrellita Dunbar RN on 1/5/2024 at 6:06 PM  [phone]  
Patient appealed his discharge. He called Garry twice but had to leave a message. I asked him to try again and speak with a person but we will most likely need to check into this by morning.     Electronically signed by Nayla Moncada RN on 1/5/2024 at 5:20 PM  [phone]  
Patient is from [facility] and is able to return there at d/c. He will need medical transport at discharge. He is also active with [facility].     He is NPO for a bronchoscopy with Dr Celeste at noon today.     Electronically signed by Nayla Moncada RN on 1/4/2024 at 8:35 AM  [phone]  
Spoke with patient today and he is from [facility] and plans on returning there at d/c. He is active with [facility] as well. He will need transport upon d/c.     Patient is NPO for bronchoscopy.     Electronically signed by Nayla Moncada RN on 1/3/2024 at 3:48 PM  [phone]  
Services:  Location: Not Applicable  Agency:  [facility]   Phone: [phone]    Consents signed: Not Indicated      Additional CM Notes: Patient has agreed to d/c back to [facility] tonight at 11 PM. Arranged transport and notified both hospice and his nurse at [place] of d/c time. [facility]S/[facility] faxed and RN to call report.     COVID Result:    Lab Results   Component Value Date/Time    COVID19 Not Detected 01/27/2022 01:34 AM       The Plan for Transition of Care is related to the following treatment goals of Complicated urinary tract infection [N39.0]  Sepsis secondary to UTI (HCC) [A41.9, N39.0]    The Patient and/or patient representative Tushar and his family were provided with a choice of provider and agrees with the discharge plan Yes    Freedom of choice list was provided with basic dialogue that supports the patient's individualized plan of care/goals and shares the quality data associated with the providers. Yes    Care Transitions patient: No    Nayla Moncada RN  [facility]  Case Management Department  Ph: [phone]  Fax: [phone]

## 2024-01-08 NOTE — PROGRESS NOTES
Pulmonary Followup Note    CC: Left lower lobe lung mass, loculated effusion, urinary tract infection  Subjective:  Patient breathing easily on 1 L and saturating well.  He remains on antibiotics for his urinary tract infection.  No issues post bronchoscopy.    ROS:  Denies headache, nausea or chest pain.    24HR INTAKE/OUTPUT:    Intake/Output Summary (Last 24 hours) at 2024 1543  Last data filed at 2024 0837  Gross per 24 hour   Intake 120 ml   Output 300 ml   Net -180 ml          SMOG Enema  330 mL Rectal Once    sodium chloride flush  5-40 mL IntraVENous 2 times per day    [Held by provider] enoxaparin  40 mg SubCUTAneous Daily    sodium chloride flush  5-40 mL IntraVENous 2 times per day    atorvastatin  80 mg Oral Nightly    docusate sodium  100 mg Oral QAM AC    gabapentin  600 mg Oral BID    levothyroxine  75 mcg Oral Daily    pantoprazole  40 mg Oral QAM AC    traZODone  100 mg Oral Nightly           PHYSICAL EXAMINATION:  BP (!) 148/84   Pulse 65   Temp 97.9 °F (36.6 °C) (Oral)   Resp 18   Ht 1.829 m (6')   Wt 94.2 kg (207 lb 10.8 oz)   SpO2 95%   BMI 28.17 kg/m²   CURRENT PULSE OXIMETRY:  SpO2: 95 %  24HR PULSE OXIMETRY RANGE:  SpO2  Av.9 %  Min: 92 %  Max: 98 %       Gen: No distress. Speaking in full sentences with out accessory muscle use  HEENT: PERRL, EOMI, OP nl  Lung: Decreased breath sounds at the bases, otherwise clear  CV: RRR without M/R/R  Abd: +BS, soft, NT/ND  Ext: Trace edema.    DATA  CBC:   Recent Labs     24  0855 24  0824  0439   WBC 7.5 7.5 6.3   HGB 10.4* 10.5* 9.7*   HCT 33.0* 32.7* 30.5*   MCV 86.1 85.5 87.5    218 192       BMP:   Recent Labs     24  0855 24  0824  0439    139 139   K 4.8 4.7 5.3*    109 109   CO2    BUN 31* 26* 22*   CREATININE 0.6* 0.7* 0.6*       No results for input(s): \"PHART\", \"YFN0LWN\", \"PO2ART\" in the last 72 hours.  LIVER PROFILE: 
                                       Pulmonary Followup Note    CC: Left lower lobe lung mass, loculated effusion, urinary tract infection  Subjective:  Patient breathing easily on room air denies any acute complaints.  He remains on antibiotics for his urinary tract infection.    ROS:  Denies headache, nausea or chest pain.    24HR INTAKE/OUTPUT:    Intake/Output Summary (Last 24 hours) at 1/3/2024 1545  Last data filed at 1/3/2024 1108  Gross per 24 hour   Intake --   Output 2175 ml   Net -2175 ml        [START ON 2024] vancomycin  750 mg IntraVENous Q12H    SMOG Enema  330 mL Rectal Once    sodium chloride flush  5-40 mL IntraVENous 2 times per day    [Held by provider] enoxaparin  40 mg SubCUTAneous Daily    sodium chloride flush  5-40 mL IntraVENous 2 times per day    atorvastatin  80 mg Oral Nightly    docusate sodium  100 mg Oral QAM AC    gabapentin  600 mg Oral BID    levothyroxine  75 mcg Oral Daily    pantoprazole  40 mg Oral QAM AC    traZODone  100 mg Oral Nightly    gentamicin  5 mg/kg (Ideal) IntraVENous Q24H           PHYSICAL EXAMINATION:  /68   Pulse 75   Temp 97.7 °F (36.5 °C) (Oral)   Resp 16   Ht 1.829 m (6')   Wt 94 kg (207 lb 3.7 oz)   SpO2 92%   BMI 28.11 kg/m²   CURRENT PULSE OXIMETRY:  SpO2: 92 %  24HR PULSE OXIMETRY RANGE:  SpO2  Av.5 %  Min: 91 %  Max: 96 %       Gen: No distress. Speaking in full sentences with out accessory muscle use  HEENT: PERRL, EOMI, OP nl  Lung: Decreased breath sounds at the bases, otherwise clear  CV: RRR without M/R/R  Abd: +BS, soft, NT/ND  Ext: Trace edema.    DATA  CBC:   Recent Labs     24  1424 24  0518 24  0855   WBC 13.6* 7.6 7.5   HGB 12.4* 10.9* 10.4*   HCT 39.2* 34.4* 33.0*   MCV 85.7 86.7 86.1    205 222     BMP:   Recent Labs     24  1424 24  0518 24  0855   * 137 136   K 5.2* 4.4  4.4 4.8   CL 99 107 107   CO2 23 22 24   BUN 73* 54* 31*   CREATININE 1.0 0.7* 0.6*     No 
                                       Pulmonary Followup Note    CC: Left lower lobe lung mass, loculated effusion, urinary tract infection  Subjective:  Patient breathing easily on room air denies any acute complaints.  He remains on antibiotics for his urinary tract infection.  He's NPO for bronch    ROS:  Denies headache, nausea or chest pain.    24HR INTAKE/OUTPUT:    Intake/Output Summary (Last 24 hours) at 2024 1033  Last data filed at 2024 0744  Gross per 24 hour   Intake --   Output 1325 ml   Net -1325 ml          [START ON 2024] gentamicin  5 mg/kg (Ideal) IntraVENous Q48H    vancomycin  750 mg IntraVENous Q12H    SMOG Enema  330 mL Rectal Once    sodium chloride flush  5-40 mL IntraVENous 2 times per day    [Held by provider] enoxaparin  40 mg SubCUTAneous Daily    sodium chloride flush  5-40 mL IntraVENous 2 times per day    atorvastatin  80 mg Oral Nightly    docusate sodium  100 mg Oral QAM AC    gabapentin  600 mg Oral BID    levothyroxine  75 mcg Oral Daily    pantoprazole  40 mg Oral QAM AC    traZODone  100 mg Oral Nightly           PHYSICAL EXAMINATION:  BP (!) 152/71   Pulse 62   Temp 98 °F (36.7 °C) (Oral)   Resp 16   Ht 1.829 m (6')   Wt 94.2 kg (207 lb 10.8 oz)   SpO2 93%   BMI 28.17 kg/m²   CURRENT PULSE OXIMETRY:  SpO2: 93 %  24HR PULSE OXIMETRY RANGE:  SpO2  Av.2 %  Min: 91 %  Max: 93 %       Gen: No distress. Speaking in full sentences with out accessory muscle use  HEENT: PERRL, EOMI, OP nl  Lung: Decreased breath sounds at the bases, otherwise clear  CV: RRR without M/R/R  Abd: +BS, soft, NT/ND  Ext: Trace edema.    DATA  CBC:   Recent Labs     2418 24  0855 24  0822   WBC 7.6 7.5 7.5   HGB 10.9* 10.4* 10.5*   HCT 34.4* 33.0* 32.7*   MCV 86.7 86.1 85.5    222 218       BMP:   Recent Labs     2418 24  0855 24  0822    136 139   K 4.4  4.4 4.8 4.7    107 109   CO2 22 24 26   BUN 54* 31* 26*   CREATININE 
     Urology Attending Progress Note      Subjective: No complaints    Vitals:  /73   Pulse 65   Temp 97.8 °F (36.6 °C) (Oral)   Resp 16   Ht 1.829 m (6')   Wt 94 kg (207 lb 3.7 oz)   SpO2 92%   BMI 28.11 kg/m²   Temp  Av.7 °F (36.5 °C)  Min: 97.4 °F (36.3 °C)  Max: 98.1 °F (36.7 °C)    Intake/Output Summary (Last 24 hours) at 1/3/2024 0846  Last data filed at 1/3/2024 0821  Gross per 24 hour   Intake --   Output 1950 ml   Net -1950 ml       Exam: Urine clear in schneider    Labs:  WBC:    Lab Results   Component Value Date/Time    WBC 7.6 2024 05:18 AM     Hemoglobin/Hematocrit:    Lab Results   Component Value Date/Time    HGB 10.9 2024 05:18 AM    HCT 34.4 2024 05:18 AM     BMP:    Lab Results   Component Value Date/Time     2024 05:18 AM    K 4.4 2024 05:18 AM    K 4.4 2024 05:18 AM     2024 05:18 AM    CO2 22 2024 05:18 AM    BUN 54 2024 05:18 AM    LABALBU 2.6 2024 05:18 AM    CREATININE 0.7 2024 05:18 AM    CALCIUM 8.3 2024 05:18 AM    GFRAA >60 10/11/2022 05:52 PM    LABGLOM >60 2024 05:18 AM     PT/INR:    Lab Results   Component Value Date/Time    PROTIME 16.0 2022 03:28 PM    INR 1.29 2022 03:28 PM     PTT:    Lab Results   Component Value Date/Time    APTT 36.4 10/11/2022 05:52 PM   [APTT    Urine Culture:  NGTD    Blood Culture:  NGTD      Imaging: IMPRESSION:  1. Thickening of the urinary bladder wall is identified with inflammatory changes surrounding the bladder suggestive for severe cystitis evidence of a diverticulum extending off the superior portion of the bladder is identified. There is significant   amount of inflammatory change around this diverticulum appearing focus, I cannot exclude a contained bladder perforation. Inflammatory change around this region is present.  2. Fecal impaction of the rectal vault with mild thickening of the bladder wall for which I cannot exclude some 
    V2.0    [facility] [place] Progress Note      Name:  Tushar Block /Age/Sex: 1940  (83 y.o. male)   MRN & CSN:  [id number] & [id number] Encounter Date/Time: 2024 12:21 PM EST   Location:  Endo Pool/NONE PCP: Mario Alberto Tran     Attending:José Manuel Anderson MD       Hospital Day: 4    Assessment and Recommendations   Tushar Block is a 83 y.o. male with pmh of  BPH, C. difficile, GERD, hypertension, hypothyroidism and  neurogenic bladder with chronic catheter presented with MDR UTI, groin pain  who presents with Sepsis secondary to UTI (HCC)    CT abd shows  Thickening of the urinary bladder wall is identified with inflammatory changes surrounding the bladder suggestive for severe cystitis evidence of a diverticulum extending off the superior portion of the bladder is identified     CT chest without contrast with consolidation and loculated appearing fluid fluid at the left lung base.  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 neurogenic bladder; Iglesias changed; groin pain relieved; CT abdomen pelvis with thickened urinary bladder with inflammatory changes; urology evaluation-no surgical intervention at this time; infectious disease consult appreciated recommended to continue gentamicin vancomycin for history of MDR Morganella and Enterococcus' follow urine and blood culture; per infectious disease if urine culture remains negative by tomorrow and patient remains asymptomatic we will consider stopping further antibiotic treatment  Left lung base loculated pleural effusion with consolidation per CT chest-pulmonary evaluation;  patient does not have any respiratory symptoms of cough or shortness of breath stable on room air; continue ABX; pulmonary consult has been placed.  Patient wished to go ahead with a biopsy to get definitive answers; n.p.o. for bronc later today  Neurogenic bladder-Iglesias replaced with resolution of 
    V2.0    [facility] [place]      Name:  Tushar Block /Age/Sex: 1940  (83 y.o. male)   MRN & CSN:  [id number] & [id number] Encounter Date/Time: 2024 12:21 PM EST   Location:  5324/5324-01 PCP: Mario Alberto Tran     Attending:José Manuel Anderson MD       Hospital Day: 2    Assessment and Recommendations   Tushar Block is a 83 y.o. male with pmh of  BPH, C. difficile, GERD, hypertension, hypothyroidism and  neurogenic bladder with chronic catheter presented with MDR UTI, groin pain  who presents with Sepsis secondary to UTI (HCC)    CT abd shows  Thickening of the urinary bladder wall is identified with inflammatory changes surrounding the bladder suggestive for severe cystitis evidence of a diverticulum extending off the superior portion of the bladder is identified     CT chest without contrast with consolidation and loculated appearing fluid fluid at the left lung base.  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 neurogenic bladder; Iglesias changed; groin pain relieved; CT abdomen pelvis with thickened urinary bladder with inflammatory changes; urology evaluation-no surgical intervention at this time; infectious disease consult appreciated recommended to continue gentamicin vancomycin for history of MDR Morganella and Enterococcus' follow urine and blood culture  Left lung base loculated pleural effusion with consolidation per CT chest-pulmonary evaluation pending; patient does not have any respiratory symptoms of cough or shortness of breath stable on room air; continue ABX  Neurogenic bladder-Iglesias replaced with resolution of groin pain; seen by urology with normal creatinine improvement in pain likelihood of bladder perforation or minimal; clinically stable therefore advance diet; per urology if patient declines can consider CT cystogram  Fecal impaction-resolved with aggressive bowel regimen; 
  Comprehensive Nutrition Assessment    RECOMMENDATIONS:  PO Diet: Regular, monitor and encourage protein intake with each meal  ONS: Add Ensure Plus HP BID  Nutrition Education: No recommendation at this time     NUTRITION ASSESSMENT:   Nutritional summary & status: Positive screen for wounds. Patient with multiple pressure injuries, admitted with sepsis r/t UTI. Pt with another dicipline at visit, chart review providing hx. No BM or PO recorded. Regular diet and fluids ordered. RD will order ONS and continue to monitor nutritional status.   Admission // PMH: UTI, Sepsis // HTN, CAD, GERD, BPH    MALNUTRITION ASSESSMENT  Context of Malnutrition: Acute Illness   Malnutrition Status: Insufficient data    NUTRITION DIAGNOSIS   Increased nutrient needs related to increase demand for energy/nutrients as evidenced by wounds    Nutrition Monitoring and Evaluation:   Food/Nutrient Intake Outcomes:  Food and Nutrient Intake, Supplement Intake  Physical Signs/Symptoms Outcomes:  Biochemical Data, GI Status, Nutrition Focused Physical Findings     OBJECTIVE DATA: Significant to nutrition assessment  Nutrition Related Findings: Mg 2.6, BG 93, bowel regimen ordered  Wounds: Multiple, Pressure Injury  Nutrition Goals: PO intake 75% or greater, prior to discharge     CURRENT NUTRITION THERAPIES  ADULT DIET; Regular  PO Intake: Unable to assess   PO Supplement Intake:None Ordered  Additional Sources of Calories/IVF:    LR runnung at 150 ml/hr  COMPARATIVE STANDARDS  Energy (kcal):  8648-6007 (20-25)     Protein (g):   (1.2-1.4)       Fluid (ml/day):  3597-7398 ml    ANTHROPOMETRICS  Current Height: 182.9 cm (6')  Current Weight - Scale: 94 kg (207 lb 3.7 oz)    Admission weight: 90 kg (198 lb 6.4 oz)    The patient will be monitored per nutrition standards of care. Consult dietitian if additional nutrition interventions are needed prior to RD reassessment.     Susanna Canales RD  Kevin:  [phone]  Office:  [phone]     
4 Eyes Skin Assessment     NAME:  Tushar Block  YOB: 1940  MEDICAL RECORD NUMBER:  [id number]    The patient is being assessed for  Admission    I agree that at least one RN has performed a thorough Head to Toe Skin Assessment on the patient. ALL assessment sites listed below have been assessed.      Areas assessed by both nurses:  Stage 2 pressure wound on right shin that was bandaged prior to arriving, dressing removed, wound assessed, and wound rebandaged. Stage 2 pressure injury noted on coccyx and bilateral buttocks. Large Stage 2 pressure injury noted on upper and mid back. Abrasion noted on left arm. Left heel abrasion that was scabbed over. Scattered healed abrasions.         Does the Patient have a Wound? Yes wound(s) were present on assessment. LDA wound assessment was Initiated and completed by RN       Terrance Prevention initiated by RN: Yes  Wound Care Orders initiated by RN: Yes    Pressure Injury (Stage 3,4, Unstageable, DTI, NWPT, and Complex wounds) if present, place Wound referral order by RN under : No    New Ostomies, if present place, Ostomy referral order under : No     Nurse 1 eSignature: Electronically signed by Luis Grossman RN on 1/1/24 at 11:58 PM EST    **SHARE this note so that the co-signing nurse can place an eSignature**    Nurse 2 eSignature: Electronically signed by Tanna Cunha RN on 1/2/24 at 2:59 AM EST    
APRN notified by RN of positive BC results  -1/2 positive for staph epi  -suspect contamination  -currently on Vanc/gent.   -ID following  -cont current regimen    TIM Lowe - NP   
Attempted to call patient's family. No answer. Called waiting room, no family there.  
Clinical Pharmacy Progress Note    Vancomycin - Management by Pharmacy  Consult Date(s): 01/01/24  Consulting Provider(s):  Dr Cheatham    Gentamicin - Management by Pharmacy  Consult Date(s): 01/01/24  Consulting Provider(s):  Dr Cheatham    Assessment / Plan  1)  UTI -   Vancomycin  Day of Vanc Therapy / Ordered Duration: day 2 of 5  Current Dosing Method: Bayesian-Guided AUC Dosing  Therapeutic Goal: -600 mg/L*hr  Current Dose / Plan:   SCr 1.0-->0.7 today.  Received loading dose 2250mg IV x1 last evening.  Will continue with 1000mg IV q24h.  Regimen predicts an AUC = 539 with trough = 16.7 mcg/mL.  Random level is ordered for 1/3 AM to further evaluate above regimen.  Will continue to monitor clinical condition and make adjustments to regimen as appropriate.    Gentamicin  Day of 1 Therapy / Ordered Duration: day 2 of 5  Current Dosing Method: Once daily Extended-Interval Aminoglycoside dosing (Urban-Julián nomogram)  Therapeutic Goal: 10-hr post-dose level per nomogram  Current Dose / Plan:  Pt received loading dose of Gent 450mg IV x1 last evening.  Gentamicin 10-hr post-dose level ordered for this AM - will likely result in next 24 hrs as this is sent to W to be run.  SCr 1.0-->0.7 today.    Will continue with 5mg/kg IV q24h for now.  Will f/u on level when available.  Will continue to monitor clinical condition and make adjustments to regimen as appropriate.    Please call with questions--  Thanks--  Roxie Lugo, PharmD, BCPS, BCGP  [id number] ([facility])   1/2/2024 11:23 AM      Interval update:  ID & urology consulted.  Blood / urine culture in process. Pulmonology consulted for loculated pleural effusion.      Subjective/Objective:   Tushar Block is a 83 y.o. male with a PMHx significant for osteomyelitis, HTN, GERD, CAD, hypothyroidism, CKD, neurogenic bladder with chronic schneider & h/o MDRO UTI's, and h/o LE osteomyelitis / wounds who is admitted with sepsis secondary to UTI and fecal 
ID Follow-up NOTE    CC:   MDR UTI  Antibiotics: Vanco, gentamicin    Admit Date: 1/1/2024  Hospital Day: 3    Subjective:     Patient denies any pain, no fevers or chills. No resp complaints.       Objective:     Patient Vitals for the past 8 hrs:   BP Temp Temp src Pulse Resp SpO2 Weight   01/03/24 0818 131/73 97.8 °F (36.6 °C) Oral 65 16 92 % --   01/03/24 0600 -- -- -- -- -- -- 94 kg (207 lb 3.7 oz)   01/03/24 0334 136/76 97.8 °F (36.6 °C) Oral 66 16 92 % --     I/O last 3 completed shifts:  In: 1250 [I.V.:1000; IV Piggyback:250]  Out: 2250 [Urine:2250]  I/O this shift:  In: -   Out: 250 [Urine:250]    EXAM:  GENERAL: No apparent distress.    HEENT: Membranes moist, no oral lesion  NECK:  Supple, no lymphadenopathy  LUNGS: Clear b/l, no rales, no dullness  CARDIAC: RRR, no murmur appreciated  ABD:  + BS, soft / NT  EXT:  No rash, no edema, no lesions, Status post bilateral fifth metatarsal amputations, well-healed. Dry skin on bilateral feet with venous stasis changes on bilateral legs.  NEURO: No focal neurologic findings  PSYCH: Orientation, sensorium, mood normal  LINES:  Peripheral iv       Data Review:  Lab Results   Component Value Date    WBC 7.5 01/03/2024    HGB 10.4 (L) 01/03/2024    HCT 33.0 (L) 01/03/2024    MCV 86.1 01/03/2024     01/03/2024     Lab Results   Component Value Date    CREATININE 0.6 (L) 01/03/2024    BUN 31 (H) 01/03/2024     01/03/2024    K 4.8 01/03/2024     01/03/2024    CO2 24 01/03/2024       Hepatic Function Panel:   Lab Results   Component Value Date/Time    ALKPHOS 105 01/03/2024 08:55 AM    ALT 13 01/03/2024 08:55 AM    AST 15 01/03/2024 08:55 AM    PROT 7.1 01/03/2024 08:55 AM    BILITOT 0.3 01/03/2024 08:55 AM    BILIDIR <0.2 01/01/2024 02:24 PM    IBILI see below 01/01/2024 02:24 PM    LABALBU 2.7 01/03/2024 08:55 AM       MICRO:  Urine culture 1/1: neg to date   Blood cultures x 2 1/1: neg to date     IMAGING:  CT abdomen and pelvis 1/1:  1. Thickening 
ID Follow-up NOTE    CC:   MDR UTI  Antibiotics: Vanco, gentamicin    Admit Date: 1/1/2024  Hospital Day: 4    Subjective:     Patient denies any pain, no fevers or chills. No resp complaints.  Planning for bronchoscopy today      Objective:     Patient Vitals for the past 8 hrs:   BP Temp Temp src Pulse Resp SpO2 Weight   01/04/24 0747 (!) 152/71 98 °F (36.7 °C) Oral 62 16 93 % --   01/04/24 0600 -- -- -- -- -- -- 94.2 kg (207 lb 10.8 oz)       I/O last 3 completed shifts:  In: -   Out: 2025 [Urine:2025]  I/O this shift:  In: -   Out: 250 [Urine:250]    EXAM:  GENERAL: No apparent distress.    HEENT: Membranes moist, no oral lesion  NECK:  Supple, no lymphadenopathy  LUNGS: Clear b/l, no rales, no dullness  CARDIAC: RRR, no murmur appreciated  ABD:  + BS, soft / NT  EXT:  No rash, no edema, no lesions, Status post bilateral fifth metatarsal amputations, well-healed. Dry skin on bilateral feet with venous stasis changes on bilateral legs.  NEURO: No focal neurologic findings  PSYCH: Orientation, sensorium, mood normal  LINES:  Peripheral iv       Data Review:  Lab Results   Component Value Date    WBC 7.5 01/04/2024    HGB 10.5 (L) 01/04/2024    HCT 32.7 (L) 01/04/2024    MCV 85.5 01/04/2024     01/04/2024     Lab Results   Component Value Date    CREATININE 0.7 (L) 01/04/2024    BUN 26 (H) 01/04/2024     01/04/2024    K 4.7 01/04/2024     01/04/2024    CO2 26 01/04/2024       Hepatic Function Panel:   Lab Results   Component Value Date/Time    ALKPHOS 107 01/04/2024 08:22 AM    ALT 17 01/04/2024 08:22 AM    AST 15 01/04/2024 08:22 AM    PROT 7.4 01/04/2024 08:22 AM    BILITOT <0.2 01/04/2024 08:22 AM    BILIDIR <0.2 01/01/2024 02:24 PM    IBILI see below 01/01/2024 02:24 PM    LABALBU 2.8 01/04/2024 08:22 AM       MICRO:  Urine culture 1/1: neg to date   Blood cultures x 2 1/1: 1 out of 2 sets positive for staph epi    IMAGING:  CT abdomen and pelvis 1/1:  1. Thickening of the urinary bladder 
ID Follow-up NOTE    CC:   MDR UTI  Antibiotics: Vanco, gentamicin    Admit Date: 1/1/2024  Hospital Day: 5    Subjective:     Patient denies any pain, no fevers or chills. No resp complaints.  S/p bronchoscopy yesterday.       Objective:     Patient Vitals for the past 8 hrs:   BP Temp Temp src Pulse Resp SpO2   01/05/24 1140 135/72 97.9 °F (36.6 °C) Oral 55 18 97 %   01/05/24 0834 134/64 97.7 °F (36.5 °C) Oral 56 18 95 %       I/O last 3 completed shifts:  In: 725 [I.V.:725]  Out: 1425 [Urine:1425]  I/O this shift:  In: 120 [P.O.:120]  Out: -     EXAM:  GENERAL: No apparent distress.    HEENT: Membranes moist, no oral lesion  NECK:  Supple, no lymphadenopathy  LUNGS: Clear b/l, no rales, no dullness  CARDIAC: RRR, no murmur appreciated  ABD:  + BS, soft / NT  EXT:  No rash, no edema, no lesions, Status post bilateral fifth metatarsal amputations, well-healed. Dry skin on bilateral feet with venous stasis changes on bilateral legs.  NEURO: No focal neurologic findings  PSYCH: Orientation, sensorium, mood normal  LINES:  Peripheral iv       Data Review:  Lab Results   Component Value Date    WBC 6.3 01/05/2024    HGB 9.7 (L) 01/05/2024    HCT 30.5 (L) 01/05/2024    MCV 87.5 01/05/2024     01/05/2024     Lab Results   Component Value Date    CREATININE 0.6 (L) 01/05/2024    BUN 22 (H) 01/05/2024     01/05/2024    K 5.3 (H) 01/05/2024     01/05/2024    CO2 24 01/05/2024       Hepatic Function Panel:   Lab Results   Component Value Date/Time    ALKPHOS 105 01/05/2024 04:39 AM    ALT 13 01/05/2024 04:39 AM    AST 12 01/05/2024 04:39 AM    PROT 7.0 01/05/2024 04:39 AM    BILITOT <0.2 01/05/2024 04:39 AM    BILIDIR <0.2 01/01/2024 02:24 PM    IBILI see below 01/01/2024 02:24 PM    LABALBU 2.8 01/05/2024 04:39 AM       MICRO:  Urine culture 1/1: neg   Blood cultures x 2 1/1: 1 out of 2 sets positive for staph epi    IMAGING:  CT abdomen and pelvis 1/1:  1. Thickening of the urinary bladder wall is 
PACU Transfer Note    Vitals:    01/04/24 1450   BP: 156/83   Pulse: 107   Resp: 13   Temp: 97.7 °F (36.5 °C)   SpO2: 97%     Patient denies nausea or pain. Tachycardia improving since PACU arrival.      In: 725 [I.V.:725]  Out: 625 [Urine:625]    Pain assessment:  none       Patient transported by to [facility] by stretcher with Hortencia the transporter.    1/4/2024 3:00 PM      
Palliative Care Chart Review  and Check in Note:     NAME:  Tushar Block  Admit Date: 1/1/2024  Hospital Day:  Hospital Day: 4   Current Code status: Full Code    Palliative care is continuing to following Mr. Block for symptom management,  and goals of care discussion as needed. Patient's chart reviewed today 1/4/24.        The following are the currently established goals/code status, and Symptom management.     Goals of care: Tushar still wants to continue current management, I discussed with him that he was in hospice before admission and was told that hospice was seeing him for wound care, Code status was discussed and was a full code prior to admission wants to be a full code.He has an understanding of the current treatments in the hospital and would like to be admitted to the hospital if medically needed.     Called \"[facility] hospice\" discussed about Tushar.   As per [facility] Tushar was discharged from the hospice yesterday and was a full code while he was with hospice service.     Code status: Full    Discharge plan: NBA Jimenez MD, PGY-3  01/04/24  3:17 PM    
Patient Tushar Block to room 5324 from ED. Patient is A&O x 4. VSS. Patient oriented to the room all safety measures in place. Patient given IS and SCDs at this time. Admission orders released and patient 4 eyes completed. Admission documentation completed. No other needs are noted at this time.    [x] Bed alarm on and cord plugged into wall  [x] Bed in lowest position  [x] Call light and bedside table within reach  [x] Patient educated on all safety measures  []Oxygen connected to wall (if applicable)     Nurse 1 Esignature: Electronically signed by Luis Grossman RN on 1/1/24 at 11:59 PM EST  Nurse 2 Esignature: Electronically signed by Tanna Cunha RN on 1/2/24 at 2:58 AM EST    
Patient alert and oriented. Vitals stable through the night. IV vancomycin given.   Bed bath given. Iglesias care given.   Tramadol given for foot pain with benefits.   
Patient brought to PACU #10 from bronch procedure. Patient placed on monitor. Report received from RN and CRNA. VSS, slightly tachycardic.    
Patient had medium sized BM this morning, NP made aware.    Electronically signed by Bethanie Stevens RN on 1/2/2024 at 9:38 AM    
Physical Therapy  Discharge Note    Order received and chart reviewed.  Met with patient who reports living at [facility].  Pt is mostly bedbound, only getting up to w/c a few times a week via raymundo lift.  \"I'm not supposed to really sit in my chair too long because of my legs.\"  Pt has been non-ambulatory and required a raymundo lift for at least 2 years.  Pt has meals delivered to his room.  Pt requires total assist for ADLs from bed level by staff.  No acute PT needs at this time.  Recommend pt return to [facility] with previous assist.  If facility can no longer meet pt's care needs, then recommend LTC.  Will sign off.  RN aware.    Hoa Mcclure, PT #[id number]     
Pt  A&Ox4. VSS this shift. Not on respi distress.     Pt on O2 at 2L via NC. With good O2 saturation. No BM this shift. Iglesias in place, draining, patent. Wound care done, see flowsheets.      Needs met at this time. Bed in lowest position. Bed wheels locked and alarm turned on. Call light within reach.     Electronically signed by Iva Chilel RN on 1/8/2024 at 4:54 AM   
Pt A &O x4. VSS this shift. Not in respiratory distress. Pt denies n/v. Pt with complaints of pain. Prn medications given.     Pt on O2 at 2L via NC,without desaturation. Unlabored breathing. Wound care done. No BM this shift. Iglesias in place, patent.     No other needs at this time. Bed in lowest position. Bed wheels locked and alarm turned on. Call light within reach.     Electronically signed by Iva Chilel RN on 1/7/2024 at 6:55 AM   
Pt is A&Ox4. VSS this shift. Pt denies any pain, nausea/vomiting. Pt not in respi distress.    Pt with unlabored breathing on 1L of O2 via NC with good O2 saturation. No BM this shift. Iglesias cath in place, draining, patent with cloudy, yellow urine output. Wound care done, see flowsheets.     No other needs at this time. Bed in lowest position. Bed wheels locked and alarm turned on. Call light within reach.     Electronically signed by Iva Chilel RN on 1/6/2024 at 7:00 AM   
Pt pleasant, cooperative, and in no apparent distress. Pt A&O x4.       VSS this shift. IS encouraged, supp o2 per nc maintained. Reports minimal pain to bilateral feet - chronic neuropathy.      Wound care provided per orders.     Tolerating PO food and fluids with excellent appetite and intake. Voiding per Iglesias; patent. No BM this shift.      ADL activity this shift includes q2h repositioning in bed with assist, dependent for hygiene, feeds self.     Pt educated re: plan of care, verbalizes understanding. Fall risk interventions maintained; pt educated re: fall risk protocol, verbalizes understanding.      Patient denies needs at this time. Bed is in the lowest position, call light and bedside table within reach.        
Pt pleasant, cooperative, and in no apparent distress. Pt A&O x4. Received visit from juan alberto.     VSS this shift, some htn. IS encouraged, supp o2 per nc maintained - sat to mid-80s on ra. Reports minimal pain to bilateral feet - chronic neuropathy.     Wound care provided per orders.    Tolerating PO food and fluids with excellent appetite and intake. Voiding per Iglesias; patent. BM smear this shift.     ADL activity this shift includes q2h repositioning in bed with assist, dependent for hygiene, feeds self.    Pt educated re: plan of care, verbalizes understanding. Fall risk interventions maintained; pt educated re: fall risk protocol, verbalizes understanding.     Patient denies needs at this time. Bed is in the lowest position, call light and bedside table within reach.     
Pt pulse was consistently in the high 30's and low 40's. Pt asymptomatic. MD notified and no new orders at this time.    Electronically signed by Luis Grossman RN on 1/2/2024 at 5:41 AM    
Pt refusing boot on left foot due to pain. Attempted to reposition boot with no benefit. Pt reiterated the refusal of the boot. Boot removed and pillow placed under left foot. Pt states \"the pain is gone\".     Electronically signed by Luis Grossman RN on 1/3/2024 at 9:10 PM    
Pulse stays at 38-42 bpm. Pt asymptomatic. MD notified. No new orders at this time.    Electronically signed by Luis Grossman RN on 1/2/2024 at 5:42 AM    
Report given to patient's RN from the floor at bedside. Verified oral temperature of 97.7.  
Spoke with nursing home to have home med list complete.    Electronically signed by Bethanie Stevens RN on 1/2/2024 at 7:49 AM    
VSS, though BP slightly elevated. Resting in bed with no complaints of pain. Wounds cleansed and dressed earlier this shift as specified in orders. Back and buttocks wounds were bleeding a small amount during the dressing change. Catheter draining yellow urine, call light within reach, and bed in lowest and locked position.  
AM      Interval update:  Per ID, plan to stop abx soon if urine culture remains negative.  Blood culture with Staph epi in 1 bottle overnight - possible contaminant?  Pulmonology planning bronch to definitively r/o malignancy today.        Subjective/Objective:   Tushar Block is a 83 y.o. male with a PMHx significant for osteomyelitis, HTN, GERD, CAD, hypothyroidism, CKD, neurogenic bladder with chronic schneider & h/o MDRO UTI's, and h/o LE osteomyelitis / wounds who is admitted with sepsis secondary to UTI and fecal impaction.     Pharmacy is consulted to dose vancomycin and gentamicin.    Ht Readings from Last 1 Encounters:   01/01/24 1.829 m (6')     Wt Readings from Last 1 Encounters:   01/03/24 94 kg (207 lb 3.7 oz)     Vancomycin dosing history:  June 2022 - Pt was on 750mg IV q12h, with level resulting in calculated AUC = 474.  Pt SCr 0.6-0.7 and wt 100.4kg at that time.  May 2022 - Pt was on 750mg IV q12h, with level resulting in calculated AUC = 560.  Dose adjusted to 1500mg q24h - Vancomycin was discontinued prior to obtaining repeat level.  Pt SCr 0.8-0.9 and wt 99.3kg at that time.    Current & Prior Antimicrobial Regimen(s):  Gentamicin - Pharmacy to dose  450mg IV x1 1/1 19:30  388 mg (5mg/kg) IV q24h (1/2-1/3)  388 mg (5mg/kg) IV q48h (1/4-current)  Vancomycin - Pharmacy to dose  2250mg IV x1 22:00  1000mg IV q12h (1/2-1/3)  750mg IV q12h (1/4-current)    Gentamicin Level(s) / Doses:    Date Time Dose Type of Level / Level Interpretation   1/2 07:30 450mg x1 LD 10-hr post-dose = ordered, not sent    1/3 08:55 5mg/kg IV q24h 10-hr post-dose = 6 mcg/mL Drawn ~14 hr after end of prior dose infused  Decrease to 5mg/kg q48h per [facility] nomogram     Vancomycin Level(s) / Doses:    Date Time Dose Type of Level / Level Interpretation   1/3 08:55 1000mg q12h Random = 23.3 mcg/mL Drawn ~9.5h after 3rd total dose  Calculated AUC = 598  Decrease to 750mg IV q12h          Note: Serum levels collected for 
definitively r/o malignancy (but low suspicion per pulm notes).        Subjective/Objective:   Tushar Block is a 83 y.o. male with a PMHx significant for osteomyelitis, HTN, GERD, CAD, hypothyroidism, CKD, neurogenic bladder with chronic schneider & h/o MDRO UTI's, and h/o LE osteomyelitis / wounds who is admitted with sepsis secondary to UTI and fecal impaction.     Pharmacy is consulted to dose vancomycin and gentamicin.    Ht Readings from Last 1 Encounters:   01/01/24 1.829 m (6')     Wt Readings from Last 1 Encounters:   01/03/24 94 kg (207 lb 3.7 oz)     Vancomycin dosing history:  June 2022 - Pt was on 750mg IV q12h, with level resulting in calculated AUC = 474.  Pt SCr 0.6-0.7 and wt 100.4kg at that time.  May 2022 - Pt was on 750mg IV q12h, with level resulting in calculated AUC = 560.  Dose adjusted to 1500mg q24h - Vancomycin was discontinued prior to obtaining repeat level.  Pt SCr 0.8-0.9 and wt 99.3kg at that time.    Current & Prior Antimicrobial Regimen(s):  Gentamicin - Pharmacy to dose  450mg IV x1 1/1 19:30  388 mg IV q24h (1/2-current)  Vancomycin - Pharmacy to dose  2250mg IV x1 22:00  1000mg IV q12h (1/2-current)    Gentamicin Level(s) / Doses:    Date Time Dose Type of Level / Level Interpretation   1/2 07:30 450mg x1 LD 10-hr post-dose = ordered, not sent    1/3 07:30 5mg/kg IV q24h 10-hr post-dose = in process Sent to [facility] lab; not resulted yet     Vancomycin Level(s) / Doses:    Date Time Dose Type of Level / Level Interpretation   1/3 08:55 1000mg q12h Random = 23.3 mcg/mL Drawn ~9.5h after 3rd total dose  Calculated AUC = 598  Decrease to 750mg IV q12h          Note: Serum levels collected for AUC-based dosing may be high if collected in close proximity to the dose administered. This is not necessarily indicative of toxicity.    Cultures & Sensitivities:    Date Site Micro Susceptibility / Result   1/1 Blood x2 No growth to date    1/1 Urine No growth to date      Recent Labs     
images are made from the lower lungs through the pubic symphysis after 100 mL of Isovue-370 intravenous contrast. Up-to-date CT equipment and radiation dose reduction techniques were employed. COMPARISON: October 27, 2022 FINDINGS: Airspace consolidation with chronic pleural fluid redemonstrated left lung base. This septated pleural fluid collection the left measures 3.9 cm in AP dimension previously measuring 3.6 cm. Calcified gallstones identified dependently. The adrenals, pancreas unremarkable. The liver, spleen unremarkable. Projecting off the posterior aspect of the right kidney, a well demarcated 3.7 cm hypodense focus is redemonstrated. There is evidence of mild pelvic caliectasis ureteral ectasis on the right. Within the superior collecting system left kidney, calcifications are identified, largest measuring 7 mm. Minimal enhancement of the wall of the left renal collecting system identified at the renal pelvis. Mild ureteral atelectasis identified extending down to the bladder. Findings CT the pelvis: Large amount stool identified within the rectal vault, this measures 8.3 cm in short axial dimension. The rectal wall is equivocally thickened measuring 5.7 mm. The rectum previously had measured 7.3 cm. The bladder wall measures 11 mm, Iglesias catheter decompresses the urinary bladder.     1. Thickening of the urinary bladder wall is identified with inflammatory changes surrounding the bladder suggestive for severe cystitis evidence of a diverticulum extending off the superior portion of the bladder is identified. There is significant amount of inflammatory change around this diverticulum appearing focus, I cannot exclude a contained bladder perforation. Inflammatory change around this region is present. 2. Fecal impaction of the rectal vault with mild thickening of the bladder wall for which I cannot exclude some element of ischemia. 3. Mild pelvic caliectasis ureteral ectasis bilaterally likely related to 
the bladder wall for which I cannot exclude some element of ischemia. 3. Mild pelvic caliectasis ureteral ectasis bilaterally likely related to narrowing of the ureteral vesicle junctions from bladder wall thickening 4. Nonobstructive left nephrolithiasis with right renal cortical cyst. No further radiological workup for this is necessary 5. Increased loculated fluid left lower lung with consolidative changes of adjacent lung parenchyma 6. Cholelithiasis Electronically signed by MD Nathaniel Rangel      CBC:   Recent Labs     01/06/24  0931 01/07/24  0541 01/08/24  0329   WBC 7.8 7.2 7.2   HGB 10.7* 9.2* 8.6*    206 188       BMP:    Recent Labs     01/06/24  0931 01/07/24  0541 01/08/24  0329    140 140   K 4.8 5.0 4.9    108 109   CO2 26 25 23   BUN 20 23* 27*   CREATININE 0.8 1.0 1.0   GLUCOSE 80 90 87       Hepatic:   Recent Labs     01/06/24  0931 01/07/24  0541 01/08/24  0329   AST 13* 8* 9*   ALT 13 10 8*   BILITOT 0.3 <0.2 <0.2   ALKPHOS 123 100 97       Lipids:   Lab Results   Component Value Date/Time    CHOL 102 06/05/2021 12:45 PM    HDL 43 06/05/2021 12:45 PM    TRIG 54 06/05/2021 12:45 PM     Hemoglobin A1C:   Lab Results   Component Value Date/Time    LABA1C 5.4 09/01/2022 04:06 AM     TSH:   Lab Results   Component Value Date/Time    TSH 3.05 06/05/2021 12:45 PM     Troponin: No results found for: \"TROPONINT\"  Lactic Acid: No results for input(s): \"LACTA\" in the last 72 hours.  BNP: No results for input(s): \"PROBNP\" in the last 72 hours.  UA:  Lab Results   Component Value Date/Time    NITRU Negative 01/01/2024 05:42 PM    COLORU Yellow 01/01/2024 05:42 PM    PHUR 6.0 01/01/2024 05:42 PM    LABCAST 0-1 Hyaline 12/16/2013 05:20 PM    WBCUA >100 01/01/2024 05:42 PM    RBCUA 11-20 01/01/2024 05:42 PM    MUCUS 1+ 01/26/2022 10:01 PM    YEAST Present 01/17/2022 05:25 PM    BACTERIA 2+ 01/01/2024 05:42 PM    CLARITYU Clear 01/01/2024 05:42 PM    SPECGRAV 1.015 01/01/2024 05:42 PM

## 2024-01-09 LAB
BACTERIA BLD CULT ORG #2: NORMAL
BACTERIA BLD CULT: NORMAL

## 2024-02-09 ENCOUNTER — TELEPHONE (OUTPATIENT)
Dept: CASE MANAGEMENT | Age: 84
End: 2024-02-09

## 2024-02-09 NOTE — TELEPHONE ENCOUNTER
Hi Dr. Celeste,  reviewing pts discharge notes, it seems that his bronch (1/4/24) results were still pending at time of discharge?  What kind of follow-up does he need?       Thanks,  Lorene Nesbitt BSN, RN   Lung Nodule Navigator  [facility]  [phone]

## 2024-02-14 NOTE — TELEPHONE ENCOUNTER
I think I tried to call him to tell him the biopsy was negative as expected, but they'd just taken him to the restroom and he couldn't talk.  He doesn't need to follow up.

## 2024-02-29 ENCOUNTER — APPOINTMENT (OUTPATIENT)
Dept: GENERAL RADIOLOGY | Age: 84
DRG: 698 | End: 2024-02-29
Payer: MEDICARE

## 2024-02-29 ENCOUNTER — HOSPITAL ENCOUNTER (INPATIENT)
Age: 84
LOS: 9 days | Discharge: NURSING, REHAB OR LONG-TERM CARE | DRG: 698 | End: 2024-03-09
Attending: EMERGENCY MEDICINE | Admitting: INTERNAL MEDICINE
Payer: MEDICARE

## 2024-02-29 DIAGNOSIS — T83.511A URINARY TRACT INFECTION ASSOCIATED WITH INDWELLING URETHRAL CATHETER, INITIAL ENCOUNTER (HCC): ICD-10-CM

## 2024-02-29 DIAGNOSIS — N39.0 URINARY TRACT INFECTION ASSOCIATED WITH INDWELLING URETHRAL CATHETER, INITIAL ENCOUNTER (HCC): ICD-10-CM

## 2024-02-29 DIAGNOSIS — J18.9 PNEUMONIA DUE TO INFECTIOUS ORGANISM, UNSPECIFIED LATERALITY, UNSPECIFIED PART OF LUNG: ICD-10-CM

## 2024-02-29 DIAGNOSIS — R11.2 NAUSEA AND VOMITING, UNSPECIFIED VOMITING TYPE: Primary | ICD-10-CM

## 2024-02-29 PROBLEM — A41.9 SEPSIS (HCC): Status: ACTIVE | Noted: 2024-02-29

## 2024-02-29 LAB
ALBUMIN SERPL-MCNC: 3.5 G/DL (ref 3.4–5)
ALBUMIN/GLOB SERPL: 0.6 {RATIO} (ref 1.1–2.2)
ALP SERPL-CCNC: 157 U/L (ref 40–129)
ALT SERPL-CCNC: 14 U/L (ref 10–40)
AMORPH SED URNS QL MICRO: ABNORMAL /HPF
ANION GAP SERPL CALCULATED.3IONS-SCNC: 12 MMOL/L (ref 3–16)
AST SERPL-CCNC: 15 U/L (ref 15–37)
BACTERIA URNS QL MICRO: ABNORMAL /HPF
BASE EXCESS BLDV CALC-SCNC: 0.9 MMOL/L (ref -2–3)
BASOPHILS # BLD: 0 K/UL (ref 0–0.2)
BASOPHILS NFR BLD: 0.2 %
BILIRUB SERPL-MCNC: 0.4 MG/DL (ref 0–1)
BILIRUB UR QL STRIP.AUTO: NEGATIVE
BUN SERPL-MCNC: 70 MG/DL (ref 7–20)
CALCIUM SERPL-MCNC: 8.5 MG/DL (ref 8.3–10.6)
CHLORIDE SERPL-SCNC: 99 MMOL/L (ref 99–110)
CLARITY UR: ABNORMAL
CO2 BLDV-SCNC: 32 MMOL/L
CO2 SERPL-SCNC: 24 MMOL/L (ref 21–32)
COHGB MFR BLDV: 1.2 % (ref 0–1.5)
COLOR UR: YELLOW
CREAT SERPL-MCNC: 1.2 MG/DL (ref 0.8–1.3)
CRYSTALS URNS MICRO: ABNORMAL /HPF
DEPRECATED RDW RBC AUTO: 19.1 % (ref 12.4–15.4)
DO-HGB MFR BLDV: 69.5 %
EKG ATRIAL RATE: 119 BPM
EKG DIAGNOSIS: NORMAL
EKG P AXIS: 31 DEGREES
EKG P-R INTERVAL: 204 MS
EKG Q-T INTERVAL: 294 MS
EKG QRS DURATION: 80 MS
EKG QTC CALCULATION (BAZETT): 413 MS
EKG R AXIS: 16 DEGREES
EKG T AXIS: 76 DEGREES
EKG VENTRICULAR RATE: 119 BPM
EOSINOPHIL # BLD: 0.1 K/UL (ref 0–0.6)
EOSINOPHIL NFR BLD: 0.5 %
EPI CELLS #/AREA URNS HPF: ABNORMAL /HPF (ref 0–5)
FLUAV RNA RESP QL NAA+PROBE: NOT DETECTED
FLUBV RNA RESP QL NAA+PROBE: NOT DETECTED
GFR SERPLBLD CREATININE-BSD FMLA CKD-EPI: >60 ML/MIN/{1.73_M2}
GLUCOSE SERPL-MCNC: 156 MG/DL (ref 70–99)
GLUCOSE UR STRIP.AUTO-MCNC: NEGATIVE MG/DL
HCO3 BLDV-SCNC: 29.5 MMOL/L (ref 24–28)
HCT VFR BLD AUTO: 39.2 % (ref 40.5–52.5)
HGB BLD-MCNC: 12.2 G/DL (ref 13.5–17.5)
HGB UR QL STRIP.AUTO: ABNORMAL
KETONES UR STRIP.AUTO-MCNC: NEGATIVE MG/DL
LACTATE BLDV-SCNC: 1.7 MMOL/L (ref 0.4–1.9)
LEUKOCYTE ESTERASE UR QL STRIP.AUTO: ABNORMAL
LIPASE SERPL-CCNC: 13 U/L (ref 13–60)
LYMPHOCYTES # BLD: 0.3 K/UL (ref 1–5.1)
LYMPHOCYTES NFR BLD: 2.2 %
MCH RBC QN AUTO: 28.1 PG (ref 26–34)
MCHC RBC AUTO-ENTMCNC: 31 G/DL (ref 31–36)
MCV RBC AUTO: 90.5 FL (ref 80–100)
METHGB MFR BLDV: 0.3 % (ref 0–1.5)
MONOCYTES # BLD: 0.4 K/UL (ref 0–1.3)
MONOCYTES NFR BLD: 3 %
NEUTROPHILS # BLD: 12 K/UL (ref 1.7–7.7)
NEUTROPHILS NFR BLD: 94.1 %
NITRITE UR QL STRIP.AUTO: NEGATIVE
NT-PROBNP SERPL-MCNC: 1103 PG/ML (ref 0–449)
PCO2 BLDV: 65.7 MMHG (ref 41–51)
PH BLDV: 7.26 [PH] (ref 7.35–7.45)
PH UR STRIP.AUTO: 8.5 [PH] (ref 5–8)
PLATELET # BLD AUTO: 209 K/UL (ref 135–450)
PMV BLD AUTO: 8.7 FL (ref 5–10.5)
PO2 BLDV: <30 MMHG (ref 25–40)
POTASSIUM SERPL-SCNC: 5.2 MMOL/L (ref 3.5–5.1)
PROT SERPL-MCNC: 9.2 G/DL (ref 6.4–8.2)
PROT UR STRIP.AUTO-MCNC: 30 MG/DL
RBC # BLD AUTO: 4.33 M/UL (ref 4.2–5.9)
RBC #/AREA URNS HPF: ABNORMAL /HPF (ref 0–4)
SAO2 % BLDV: 29 %
SARS-COV-2 RNA RESP QL NAA+PROBE: NOT DETECTED
SODIUM SERPL-SCNC: 135 MMOL/L (ref 136–145)
SP GR UR STRIP.AUTO: 1.02 (ref 1–1.03)
TROPONIN, HIGH SENSITIVITY: 113 NG/L (ref 0–22)
TROPONIN, HIGH SENSITIVITY: 96 NG/L (ref 0–22)
UA COMPLETE W REFLEX CULTURE PNL UR: YES
UA DIPSTICK W REFLEX MICRO PNL UR: YES
URN SPEC COLLECT METH UR: ABNORMAL
UROBILINOGEN UR STRIP-ACNC: 0.2 E.U./DL
WBC # BLD AUTO: 12.8 K/UL (ref 4–11)
WBC #/AREA URNS HPF: ABNORMAL /HPF (ref 0–5)

## 2024-02-29 PROCEDURE — 2580000003 HC RX 258: Performed by: INTERNAL MEDICINE

## 2024-02-29 PROCEDURE — 6370000000 HC RX 637 (ALT 250 FOR IP)

## 2024-02-29 PROCEDURE — 51702 INSERT TEMP BLADDER CATH: CPT

## 2024-02-29 PROCEDURE — 36415 COLL VENOUS BLD VENIPUNCTURE: CPT

## 2024-02-29 PROCEDURE — 85025 COMPLETE CBC W/AUTO DIFF WBC: CPT

## 2024-02-29 PROCEDURE — 6370000000 HC RX 637 (ALT 250 FOR IP): Performed by: INTERNAL MEDICINE

## 2024-02-29 PROCEDURE — 82803 BLOOD GASES ANY COMBINATION: CPT

## 2024-02-29 PROCEDURE — 96365 THER/PROPH/DIAG IV INF INIT: CPT

## 2024-02-29 PROCEDURE — 6360000002 HC RX W HCPCS: Performed by: INTERNAL MEDICINE

## 2024-02-29 PROCEDURE — 2580000003 HC RX 258

## 2024-02-29 PROCEDURE — 87040 BLOOD CULTURE FOR BACTERIA: CPT

## 2024-02-29 PROCEDURE — 83880 ASSAY OF NATRIURETIC PEPTIDE: CPT

## 2024-02-29 PROCEDURE — 6360000002 HC RX W HCPCS

## 2024-02-29 PROCEDURE — 96361 HYDRATE IV INFUSION ADD-ON: CPT

## 2024-02-29 PROCEDURE — 96375 TX/PRO/DX INJ NEW DRUG ADDON: CPT

## 2024-02-29 PROCEDURE — 84484 ASSAY OF TROPONIN QUANT: CPT

## 2024-02-29 PROCEDURE — 87636 SARSCOV2 & INF A&B AMP PRB: CPT

## 2024-02-29 PROCEDURE — 83690 ASSAY OF LIPASE: CPT

## 2024-02-29 PROCEDURE — 83605 ASSAY OF LACTIC ACID: CPT

## 2024-02-29 PROCEDURE — 1200000000 HC SEMI PRIVATE

## 2024-02-29 PROCEDURE — 87186 SC STD MICRODIL/AGAR DIL: CPT

## 2024-02-29 PROCEDURE — 99285 EMERGENCY DEPT VISIT HI MDM: CPT

## 2024-02-29 PROCEDURE — 93005 ELECTROCARDIOGRAM TRACING: CPT

## 2024-02-29 PROCEDURE — 87086 URINE CULTURE/COLONY COUNT: CPT

## 2024-02-29 PROCEDURE — 87077 CULTURE AEROBIC IDENTIFY: CPT

## 2024-02-29 PROCEDURE — 71045 X-RAY EXAM CHEST 1 VIEW: CPT

## 2024-02-29 PROCEDURE — 80053 COMPREHEN METABOLIC PANEL: CPT

## 2024-02-29 PROCEDURE — 81001 URINALYSIS AUTO W/SCOPE: CPT

## 2024-02-29 RX ORDER — SODIUM CHLORIDE, SODIUM LACTATE, POTASSIUM CHLORIDE, AND CALCIUM CHLORIDE .6; .31; .03; .02 G/100ML; G/100ML; G/100ML; G/100ML
1500 INJECTION, SOLUTION INTRAVENOUS ONCE
Status: DISCONTINUED | OUTPATIENT
Start: 2024-02-29 | End: 2024-02-29

## 2024-02-29 RX ORDER — PROMETHAZINE HYDROCHLORIDE 12.5 MG/1
12.5 TABLET ORAL EVERY 8 HOURS PRN
COMMUNITY

## 2024-02-29 RX ORDER — SODIUM CHLORIDE, SODIUM LACTATE, POTASSIUM CHLORIDE, AND CALCIUM CHLORIDE .6; .31; .03; .02 G/100ML; G/100ML; G/100ML; G/100ML
500 INJECTION, SOLUTION INTRAVENOUS ONCE
Status: DISCONTINUED | OUTPATIENT
Start: 2024-02-29 | End: 2024-02-29

## 2024-02-29 RX ORDER — ATORVASTATIN CALCIUM 40 MG/1
80 TABLET, FILM COATED ORAL NIGHTLY
Status: DISCONTINUED | OUTPATIENT
Start: 2024-02-29 | End: 2024-02-29

## 2024-02-29 RX ORDER — TRAZODONE HYDROCHLORIDE 100 MG/1
100 TABLET ORAL NIGHTLY
Status: DISCONTINUED | OUTPATIENT
Start: 2024-02-29 | End: 2024-03-01

## 2024-02-29 RX ORDER — FUROSEMIDE 40 MG/1
40 TABLET ORAL DAILY
Status: DISCONTINUED | OUTPATIENT
Start: 2024-03-01 | End: 2024-03-04

## 2024-02-29 RX ORDER — GABAPENTIN 600 MG/1
600 TABLET ORAL 2 TIMES DAILY
Status: DISCONTINUED | OUTPATIENT
Start: 2024-02-29 | End: 2024-03-09 | Stop reason: HOSPADM

## 2024-02-29 RX ORDER — ACETAMINOPHEN 325 MG/1
650 TABLET ORAL EVERY 6 HOURS PRN
Status: DISCONTINUED | OUTPATIENT
Start: 2024-02-29 | End: 2024-03-09 | Stop reason: HOSPADM

## 2024-02-29 RX ORDER — SODIUM CHLORIDE 9 MG/ML
INJECTION, SOLUTION INTRAVENOUS PRN
Status: DISCONTINUED | OUTPATIENT
Start: 2024-02-29 | End: 2024-03-09 | Stop reason: HOSPADM

## 2024-02-29 RX ORDER — ONDANSETRON 2 MG/ML
4 INJECTION INTRAMUSCULAR; INTRAVENOUS ONCE
Status: COMPLETED | OUTPATIENT
Start: 2024-02-29 | End: 2024-02-29

## 2024-02-29 RX ORDER — TRAMADOL HYDROCHLORIDE 50 MG/1
50-100 TABLET ORAL EVERY 6 HOURS PRN
Status: ON HOLD | COMMUNITY
End: 2024-03-09 | Stop reason: HOSPADM

## 2024-02-29 RX ORDER — LORATADINE 10 MG/1
10 TABLET ORAL DAILY
COMMUNITY

## 2024-02-29 RX ORDER — LACTULOSE 10 G/15ML
20 SOLUTION ORAL EVERY 4 HOURS PRN
Status: ON HOLD | COMMUNITY
End: 2024-03-09 | Stop reason: HOSPADM

## 2024-02-29 RX ORDER — ONDANSETRON 2 MG/ML
4 INJECTION INTRAMUSCULAR; INTRAVENOUS EVERY 6 HOURS PRN
Status: DISCONTINUED | OUTPATIENT
Start: 2024-02-29 | End: 2024-03-09 | Stop reason: HOSPADM

## 2024-02-29 RX ORDER — SODIUM CHLORIDE, SODIUM LACTATE, POTASSIUM CHLORIDE, AND CALCIUM CHLORIDE .6; .31; .03; .02 G/100ML; G/100ML; G/100ML; G/100ML
500 INJECTION, SOLUTION INTRAVENOUS ONCE
Status: COMPLETED | OUTPATIENT
Start: 2024-02-29 | End: 2024-02-29

## 2024-02-29 RX ORDER — MENTHOL AND ZINC OXIDE .44; 20.625 G/100G; G/100G
1 OINTMENT TOPICAL 2 TIMES DAILY
COMMUNITY

## 2024-02-29 RX ORDER — M-VIT,TX,IRON,MINS/CALC/FOLIC 27MG-0.4MG
1 TABLET ORAL DAILY
COMMUNITY

## 2024-02-29 RX ORDER — POLYETHYLENE GLYCOL 3350 17 G/17G
17 POWDER, FOR SOLUTION ORAL SEE ADMIN INSTRUCTIONS
COMMUNITY

## 2024-02-29 RX ORDER — ALBUTEROL SULFATE 90 UG/1
2 AEROSOL, METERED RESPIRATORY (INHALATION) EVERY 6 HOURS PRN
Status: DISCONTINUED | OUTPATIENT
Start: 2024-02-29 | End: 2024-03-09 | Stop reason: HOSPADM

## 2024-02-29 RX ORDER — IPRATROPIUM BROMIDE AND ALBUTEROL SULFATE 2.5; .5 MG/3ML; MG/3ML
1 SOLUTION RESPIRATORY (INHALATION) EVERY 4 HOURS PRN
Status: DISCONTINUED | OUTPATIENT
Start: 2024-02-29 | End: 2024-03-04

## 2024-02-29 RX ORDER — SODIUM CHLORIDE 9 MG/ML
INJECTION, SOLUTION INTRAVENOUS CONTINUOUS
Status: ACTIVE | OUTPATIENT
Start: 2024-02-29 | End: 2024-03-01

## 2024-02-29 RX ORDER — ACETAMINOPHEN 650 MG/1
650 SUPPOSITORY RECTAL EVERY 6 HOURS PRN
Status: DISCONTINUED | OUTPATIENT
Start: 2024-02-29 | End: 2024-03-09 | Stop reason: HOSPADM

## 2024-02-29 RX ORDER — CALCIUM CARBONATE 500 MG/1
1 TABLET, CHEWABLE ORAL 3 TIMES DAILY PRN
COMMUNITY

## 2024-02-29 RX ORDER — PANTOPRAZOLE SODIUM 40 MG/1
40 TABLET, DELAYED RELEASE ORAL
Status: DISCONTINUED | OUTPATIENT
Start: 2024-03-01 | End: 2024-03-09 | Stop reason: HOSPADM

## 2024-02-29 RX ORDER — TRAZODONE HYDROCHLORIDE 150 MG/1
150 TABLET ORAL NIGHTLY
COMMUNITY

## 2024-02-29 RX ORDER — ENOXAPARIN SODIUM 100 MG/ML
40 INJECTION SUBCUTANEOUS DAILY
Status: DISCONTINUED | OUTPATIENT
Start: 2024-02-29 | End: 2024-03-05

## 2024-02-29 RX ORDER — POLYETHYLENE GLYCOL 3350 17 G/17G
17 POWDER, FOR SOLUTION ORAL DAILY PRN
Status: DISCONTINUED | OUTPATIENT
Start: 2024-02-29 | End: 2024-03-09 | Stop reason: HOSPADM

## 2024-02-29 RX ORDER — ONDANSETRON 4 MG/1
4 TABLET, ORALLY DISINTEGRATING ORAL EVERY 8 HOURS PRN
Status: DISCONTINUED | OUTPATIENT
Start: 2024-02-29 | End: 2024-03-09 | Stop reason: HOSPADM

## 2024-02-29 RX ORDER — SODIUM CHLORIDE 0.9 % (FLUSH) 0.9 %
5-40 SYRINGE (ML) INJECTION EVERY 12 HOURS SCHEDULED
Status: DISCONTINUED | OUTPATIENT
Start: 2024-02-29 | End: 2024-03-09 | Stop reason: HOSPADM

## 2024-02-29 RX ORDER — GUAIFENESIN 600 MG/1
600 TABLET, EXTENDED RELEASE ORAL 2 TIMES DAILY PRN
Status: DISCONTINUED | OUTPATIENT
Start: 2024-02-29 | End: 2024-03-02

## 2024-02-29 RX ORDER — SODIUM CHLORIDE 0.9 % (FLUSH) 0.9 %
5-40 SYRINGE (ML) INJECTION PRN
Status: DISCONTINUED | OUTPATIENT
Start: 2024-02-29 | End: 2024-03-09 | Stop reason: HOSPADM

## 2024-02-29 RX ORDER — ACETAMINOPHEN 500 MG
1000 TABLET ORAL ONCE
Status: COMPLETED | OUTPATIENT
Start: 2024-02-29 | End: 2024-02-29

## 2024-02-29 RX ORDER — SENNOSIDES 8.6 MG
650 CAPSULE ORAL NIGHTLY
Status: ON HOLD | COMMUNITY
End: 2024-03-09 | Stop reason: HOSPADM

## 2024-02-29 RX ORDER — BISACODYL 10 MG
10 SUPPOSITORY, RECTAL RECTAL DAILY PRN
COMMUNITY

## 2024-02-29 RX ORDER — SODIUM CHLORIDE, SODIUM LACTATE, POTASSIUM CHLORIDE, AND CALCIUM CHLORIDE .6; .31; .03; .02 G/100ML; G/100ML; G/100ML; G/100ML
1000 INJECTION, SOLUTION INTRAVENOUS ONCE
Status: COMPLETED | OUTPATIENT
Start: 2024-02-29 | End: 2024-02-29

## 2024-02-29 RX ORDER — LEVOTHYROXINE SODIUM 0.07 MG/1
75 TABLET ORAL DAILY
Status: DISCONTINUED | OUTPATIENT
Start: 2024-03-01 | End: 2024-03-09 | Stop reason: HOSPADM

## 2024-02-29 RX ADMIN — SODIUM CHLORIDE: 9 INJECTION, SOLUTION INTRAVENOUS at 19:28

## 2024-02-29 RX ADMIN — ENOXAPARIN SODIUM 40 MG: 100 INJECTION SUBCUTANEOUS at 19:28

## 2024-02-29 RX ADMIN — CEFEPIME 2000 MG: 2 INJECTION, POWDER, FOR SOLUTION INTRAVENOUS at 11:27

## 2024-02-29 RX ADMIN — CEFEPIME 2000 MG: 2 INJECTION, POWDER, FOR SOLUTION INTRAVENOUS at 23:32

## 2024-02-29 RX ADMIN — GABAPENTIN 600 MG: 600 TABLET, FILM COATED ORAL at 21:47

## 2024-02-29 RX ADMIN — AZITHROMYCIN MONOHYDRATE 500 MG: 500 INJECTION, POWDER, LYOPHILIZED, FOR SOLUTION INTRAVENOUS at 11:59

## 2024-02-29 RX ADMIN — ONDANSETRON 4 MG: 2 INJECTION INTRAMUSCULAR; INTRAVENOUS at 10:39

## 2024-02-29 RX ADMIN — TRAZODONE HYDROCHLORIDE 100 MG: 100 TABLET ORAL at 21:46

## 2024-02-29 RX ADMIN — SODIUM CHLORIDE, POTASSIUM CHLORIDE, SODIUM LACTATE AND CALCIUM CHLORIDE 500 ML: 600; 310; 30; 20 INJECTION, SOLUTION INTRAVENOUS at 10:38

## 2024-02-29 RX ADMIN — ACETAMINOPHEN 1000 MG: 500 TABLET ORAL at 10:41

## 2024-02-29 RX ADMIN — SODIUM CHLORIDE, POTASSIUM CHLORIDE, SODIUM LACTATE AND CALCIUM CHLORIDE 1000 ML: 600; 310; 30; 20 INJECTION, SOLUTION INTRAVENOUS at 12:00

## 2024-02-29 RX ADMIN — VANCOMYCIN HYDROCHLORIDE 2250 MG: 10 INJECTION, POWDER, LYOPHILIZED, FOR SOLUTION INTRAVENOUS at 13:03

## 2024-02-29 NOTE — H&P
gastrointestinal endoscopy (N/A, 2022); and bronchoscopy (N/A, 2024).  Allergies:   Allergies   Allergen Reactions    Bactrim [Sulfamethoxazole-Trimethoprim] Rash     Fam HX: family history includes Heart Disease in his father.  Soc HX:   Social History     Socioeconomic History    Marital status:    Tobacco Use    Smoking status: Former     Current packs/day: 0.00     Types: Cigarettes     Quit date: 1969     Years since quittin.2    Smokeless tobacco: Never   Vaping Use    Vaping Use: Never used   Substance and Sexual Activity    Alcohol use: No    Drug use: No    Sexual activity: Not Currently     Social Determinants of Health     Food Insecurity: No Food Insecurity (2024)    Hunger Vital Sign     Worried About Running Out of Food in the Last Year: Never true     Ran Out of Food in the Last Year: Never true   Transportation Needs: No Transportation Needs (2024)    PRAPARE - Transportation     Lack of Transportation (Medical): No     Lack of Transportation (Non-Medical): No   Housing Stability: Low Risk  (2024)    Housing Stability Vital Sign     Unable to Pay for Housing in the Last Year: No     Number of Places Lived in the Last Year: 1     Unstable Housing in the Last Year: No       Medications:   Medications:    vancomycin  25 mg/kg IntraVENous Once      Infusions: Normal saline infusion  PRN Meds:  Refer to orders    Labs      CBC:   Recent Labs     24  1050   WBC 12.8*   HGB 12.2*        BMP:    Recent Labs     24  1050   *   K 5.2*   CL 99   CO2 24   BUN 70*   CREATININE 1.2   GLUCOSE 156*     Hepatic:   Recent Labs     24  1050   AST 15   ALT 14   BILITOT 0.4   ALKPHOS 157*     Lipids:   Lab Results   Component Value Date/Time    CHOL 102 2021 12:45 PM    HDL 43 2021 12:45 PM    TRIG 54 2021 12:45 PM     Hemoglobin A1C:   Lab Results   Component Value Date/Time    LABA1C 5.4 2022 04:06 AM     TSH:   Lab Results  regions. No pneumothorax is seen.     1. Suspected new airspace disease in the right lung as detailed above. In the proper clinical setting this likely reflects pneumonia. Follow-up is recommended to document resolution. Electronically signed by Gerry Jarquin DO        Electronically signed by Christian Muñoz MD on 2/29/2024 at 2:54 PM

## 2024-02-29 NOTE — CONSULTS
[facility] -  Clinical Pharmacy Note    Vancomycin - Management by Pharmacy    Consult Date(s): 2/29/24  Consulting Provider(s): Dr Jay    Assessment / Plan  Pneumonia (CAP)- Vancomycin  Concurrent Antimicrobials: Azithromycin, cefepime  Day of Vanc Therapy / Ordered Duration: 1/5  Current Dosing Method: Bayesian-Guided AUC Dosing  Therapeutic Goal: -600 mg/L*hr  Current Dose / Plan:   Pt with ALIS (SCr 1.2 on baseline ~ 0.6 -0.7)  Received 2250 mg IV x1 this afternoon in ED   Will order 1250 mg IV q24h to start tomorrow   Predicted AUC ~ 499 mg/L*hr and steady-state trough ~ 14 mg/L on this regimen with current SCr  Ordered nasal MRSA to guide potential de-escalation  Random level ordered for tomorrow am to confirm kinetic estimates/ guide further dosing  Will continue to monitor clinical condition and make adjustments to regimen as appropriate.    Thank you for consulting pharmacy,    Please call with any questions    [facility]. [facility]  [phone] (Main Pharmacy)        Interval update:  Pt was febrile this am (TMax 102), now afebrile. Has been tachycardic/hypotensive throughout day.  On 2L per NC  Subjective/Objective:   Tushar Block is a 83 y.o. male with a PMHx significant for essential hypertension, GERD, CAD  s/p CABG, chronic indwelling Iglesias catheter, MDRO, MRSA infection, hyperlipidemia, paroxysmal A-fib, not on chronic anticoagulation due to prior GI bleed and many other chronic medical conditions. Admitted 2/29 or hospital -acquired pneumonia and started on broad spectrum antibiotics     Pharmacy is consulted to dose vancomycin    Ht Readings from Last 1 Encounters:   02/29/24 1.829 m (6')     Wt Readings from Last 1 Encounters:   02/29/24 92.5 kg (204 lb)     Current & Prior Antimicrobial Regimen(s):  Azithromycin (2/29--current)   Cefepime (2/29--current)  Vancomycin- pharmacy-dosed  2250 mg IV x 1 (in ED @ 1303)  1250 mg IV q 24h (3/1--current)    Vancomycin Level(s) /

## 2024-02-29 NOTE — ED PROVIDER NOTES
ED Attending Attestation Note     Date of evaluation: 2/29/2024    This patient was seen by the resident.  I have seen and examined the patient, agree with the workup, evaluation, management and diagnosis. The care plan has been discussed.  I have reviewed the ECG and agree with interpretation.  My assessment reveals patient here with vomiting.  Requiring constant O2, PRN at baseline.  Found to have pneumonia.  Fluid resuscitation administered as well as antibiotics after cultures and patient will be admitted for treatment of pneumonia as well as a UTI.     Gerald Case MD  02/29/24 [id number]

## 2024-02-29 NOTE — ED NOTES
Pt presented to ED with chronic urinary catheter. Provider ordered new catheter placement. This RN replaced urinary catheter. Sterile technique maintained. 16fr catheter, 10cc balloon. 200mL output recorded.      Andrei Martines RN  02/29/24 [id number]

## 2024-02-29 NOTE — ED PROVIDER NOTES
Result Value Ref Range    Lactic Acid, Sepsis 1.7 0.4 - 1.9 mmol/L   Blood Gas, Venous   Result Value Ref Range    pH, Nick 7.260 (L) 7.350 - 7.450    pCO2, Nick 65.7 (H) 41.0 - 51.0 mmHg    pO2, Nick <30.0 25.0 - 40.0 mmHg    HCO3, Venous 29.5 (H) 24.0 - 28.0 mmol/L    Base Excess, Nick 0.9 -2.0 - 3.0 mmol/L    O2 Sat, Nick 29 Not established %    Carboxyhemoglobin 1.2 0.0 - 1.5 %    MetHgb, Nick 0.3 0.0 - 1.5 %    TC02 (Calc), Nick 32 mmol/L    Hemoglobin, Nick, Reduced 69.50 %   Troponin   Result Value Ref Range    Troponin, High Sensitivity 113 (H) 0 - 22 ng/L   Troponin   Result Value Ref Range    Troponin, High Sensitivity 96 (H) 0 - 22 ng/L   BNP   Result Value Ref Range    Pro-BNP 1,103 (H) 0 - 449 pg/mL   Urinalysis with Reflex to Culture    Specimen: Urine   Result Value Ref Range    Color, UA Yellow Straw/Yellow    Clarity, UA SL CLOUDY (A) Clear    Glucose, Ur Negative Negative mg/dL    Bilirubin Urine Negative Negative    Ketones, Urine Negative Negative mg/dL    Specific Gravity, UA 1.020 1.005 - 1.030    Blood, Urine MODERATE (A) Negative    pH, UA 8.5 (A) 5.0 - 8.0    Protein, UA 30 (A) Negative mg/dL    Urobilinogen, Urine 0.2 <2.0 E.U./dL    Nitrite, Urine Negative Negative    Leukocyte Esterase, Urine LARGE (A) Negative    Microscopic Examination YES     Urine Type NotGiven     Urine Reflex to Culture Yes    Microscopic Urinalysis   Result Value Ref Range    WBC, UA 21-50 (A) 0 - 5 /HPF    RBC, UA 5-10 (A) 0 - 4 /HPF    Epithelial Cells, UA 0-1 0 - 5 /HPF    Bacteria, UA 2+ (A) None Seen /HPF    Amorphous, UA 1+ /HPF    Crystals, UA 3+ Triple Phos (A) None Seen /HPF   EKG 12 Lead   Result Value Ref Range    Ventricular Rate 119 BPM    Atrial Rate 119 BPM    P-R Interval 204 ms    QRS Duration 80 ms    Q-T Interval 294 ms    QTc Calculation (Bazett) 413 ms    P Axis 31 degrees    R Axis 16 degrees    T Axis 76 degrees    Diagnosis       EKG performed in ER and to be interpreted by ER physician.Confirmed  intact.         Erasmo Zabala MD  Resident  02/29/24 3205

## 2024-02-29 NOTE — PROGRESS NOTES
Pharmacy Note - Extended Infusion Beta-Lactam Adjustment    Cefepime 1000mg Q8h for treatment of Hospital acquired pneumonia. Per [facility] Extended Infusion Beta-Lactam Policy, cefepime will be changed to 2000mg load followed by 2000mg Q12h extended infusion.    Estimated Creatinine Clearance: Estimated Creatinine Clearance: 52 mL/min (based on SCr of 1.2 mg/dL).    Dialysis Status, ALIS, CKD: n/a    BMI: Body mass index is 27.67 kg/m².    Rationale for Adjustment: Agent is renally eliminated and demonstrates time-dependent effect on bacterial eradication. Extended-infusion dosing strategy aims to enhance microbiologic and clinical efficacy.    Pharmacy will continue to monitor renal function, cultures and sensitivities (where available) and adjust dose as necessary.      Please call with any questions.    Thank you,    Niharika Decker [facility]

## 2024-03-01 LAB
ANION GAP SERPL CALCULATED.3IONS-SCNC: 9 MMOL/L (ref 3–16)
BASOPHILS # BLD: 0 K/UL (ref 0–0.2)
BASOPHILS NFR BLD: 0.3 %
BUN SERPL-MCNC: 58 MG/DL (ref 7–20)
CALCIUM SERPL-MCNC: 7.8 MG/DL (ref 8.3–10.6)
CHLORIDE SERPL-SCNC: 108 MMOL/L (ref 99–110)
CO2 SERPL-SCNC: 23 MMOL/L (ref 21–32)
CREAT SERPL-MCNC: 1 MG/DL (ref 0.8–1.3)
CRP SERPL-MCNC: 99.3 MG/L (ref 0–5.1)
DEPRECATED RDW RBC AUTO: 19 % (ref 12.4–15.4)
EOSINOPHIL # BLD: 0.1 K/UL (ref 0–0.6)
EOSINOPHIL NFR BLD: 1.3 %
ERYTHROCYTE [SEDIMENTATION RATE] IN BLOOD BY WESTERGREN METHOD: 46 MM/HR (ref 0–20)
GFR SERPLBLD CREATININE-BSD FMLA CKD-EPI: >60 ML/MIN/{1.73_M2}
GLUCOSE SERPL-MCNC: 141 MG/DL (ref 70–99)
HCT VFR BLD AUTO: 29.3 % (ref 40.5–52.5)
HGB BLD-MCNC: 9.1 G/DL (ref 13.5–17.5)
LYMPHOCYTES # BLD: 0.6 K/UL (ref 1–5.1)
LYMPHOCYTES NFR BLD: 8.4 %
MCH RBC QN AUTO: 28.6 PG (ref 26–34)
MCHC RBC AUTO-ENTMCNC: 31.2 G/DL (ref 31–36)
MCV RBC AUTO: 91.5 FL (ref 80–100)
MONOCYTES # BLD: 0.4 K/UL (ref 0–1.3)
MONOCYTES NFR BLD: 5.6 %
NEUTROPHILS # BLD: 6.4 K/UL (ref 1.7–7.7)
NEUTROPHILS NFR BLD: 84.4 %
PLATELET # BLD AUTO: 156 K/UL (ref 135–450)
PMV BLD AUTO: 8.4 FL (ref 5–10.5)
POTASSIUM SERPL-SCNC: 4.1 MMOL/L (ref 3.5–5.1)
PROCALCITONIN SERPL IA-MCNC: 0.76 NG/ML (ref 0–0.15)
RBC # BLD AUTO: 3.2 M/UL (ref 4.2–5.9)
REASON FOR REJECTION: NORMAL
REJECTED TEST: NORMAL
SODIUM SERPL-SCNC: 140 MMOL/L (ref 136–145)
VANCOMYCIN SERPL-MCNC: 24.7 UG/ML
WBC # BLD AUTO: 7.6 K/UL (ref 4–11)

## 2024-03-01 PROCEDURE — 2580000003 HC RX 258: Performed by: INTERNAL MEDICINE

## 2024-03-01 PROCEDURE — 86140 C-REACTIVE PROTEIN: CPT

## 2024-03-01 PROCEDURE — 83036 HEMOGLOBIN GLYCOSYLATED A1C: CPT

## 2024-03-01 PROCEDURE — 6360000002 HC RX W HCPCS: Performed by: INTERNAL MEDICINE

## 2024-03-01 PROCEDURE — 6370000000 HC RX 637 (ALT 250 FOR IP): Performed by: STUDENT IN AN ORGANIZED HEALTH CARE EDUCATION/TRAINING PROGRAM

## 2024-03-01 PROCEDURE — 87449 NOS EACH ORGANISM AG IA: CPT

## 2024-03-01 PROCEDURE — 85025 COMPLETE CBC W/AUTO DIFF WBC: CPT

## 2024-03-01 PROCEDURE — 84145 PROCALCITONIN (PCT): CPT

## 2024-03-01 PROCEDURE — 92610 EVALUATE SWALLOWING FUNCTION: CPT

## 2024-03-01 PROCEDURE — 36415 COLL VENOUS BLD VENIPUNCTURE: CPT

## 2024-03-01 PROCEDURE — 6370000000 HC RX 637 (ALT 250 FOR IP): Performed by: INTERNAL MEDICINE

## 2024-03-01 PROCEDURE — 84134 ASSAY OF PREALBUMIN: CPT

## 2024-03-01 PROCEDURE — 85652 RBC SED RATE AUTOMATED: CPT

## 2024-03-01 PROCEDURE — 1200000000 HC SEMI PRIVATE

## 2024-03-01 PROCEDURE — 80048 BASIC METABOLIC PNL TOTAL CA: CPT

## 2024-03-01 PROCEDURE — 87641 MR-STAPH DNA AMP PROBE: CPT

## 2024-03-01 PROCEDURE — 51702 INSERT TEMP BLADDER CATH: CPT

## 2024-03-01 PROCEDURE — 80202 ASSAY OF VANCOMYCIN: CPT

## 2024-03-01 RX ORDER — CALCIUM CARBONATE 500 MG/1
1 TABLET, CHEWABLE ORAL 3 TIMES DAILY PRN
Status: DISCONTINUED | OUTPATIENT
Start: 2024-03-01 | End: 2024-03-09 | Stop reason: HOSPADM

## 2024-03-01 RX ADMIN — GABAPENTIN 600 MG: 600 TABLET, FILM COATED ORAL at 21:22

## 2024-03-01 RX ADMIN — GABAPENTIN 600 MG: 600 TABLET, FILM COATED ORAL at 10:26

## 2024-03-01 RX ADMIN — TRAZODONE HYDROCHLORIDE 150 MG: 100 TABLET ORAL at 21:22

## 2024-03-01 RX ADMIN — CEFEPIME 2000 MG: 2 INJECTION, POWDER, FOR SOLUTION INTRAVENOUS at 10:29

## 2024-03-01 RX ADMIN — GUAIFENESIN 600 MG: 600 TABLET ORAL at 06:46

## 2024-03-01 RX ADMIN — SODIUM CHLORIDE, PRESERVATIVE FREE 10 ML: 5 INJECTION INTRAVENOUS at 10:27

## 2024-03-01 RX ADMIN — CEFEPIME 2000 MG: 2 INJECTION, POWDER, FOR SOLUTION INTRAVENOUS at 23:03

## 2024-03-01 RX ADMIN — ENOXAPARIN SODIUM 40 MG: 100 INJECTION SUBCUTANEOUS at 10:26

## 2024-03-01 RX ADMIN — LEVOTHYROXINE SODIUM 75 MCG: 0.07 TABLET ORAL at 06:26

## 2024-03-01 RX ADMIN — PANTOPRAZOLE SODIUM 40 MG: 40 TABLET, DELAYED RELEASE ORAL at 06:26

## 2024-03-01 RX ADMIN — SODIUM CHLORIDE: 9 INJECTION, SOLUTION INTRAVENOUS at 23:03

## 2024-03-01 RX ADMIN — VANCOMYCIN HYDROCHLORIDE 1000 MG: 10 INJECTION, POWDER, LYOPHILIZED, FOR SOLUTION INTRAVENOUS at 18:33

## 2024-03-01 RX ADMIN — AZITHROMYCIN MONOHYDRATE 500 MG: 500 INJECTION, POWDER, LYOPHILIZED, FOR SOLUTION INTRAVENOUS at 13:38

## 2024-03-01 NOTE — PROGRESS NOTES
Pharmacy  Note  - Admission Medication History    List of rvssy-gl-fvazvpxqf medications is complete.   I reviewed med list from Valentine Gonzalez, which was in chart on unit      The following changes made to lyfvs-pa-suwzeclbq medication list:    ADDED:    1) Tramadol  mg q6h prn  2) Tums PRN  3) Vitamin D 2000 units daily  4) Calmoseptine to penis BID  5) Bisacodyl 10 mg MI PRN  6) Lactulose 30 ml q4h PRN constipation  7) Loratadine 10 mg daily  8) Miralax 17g daily + PRN  9) MVI daily  10) Promethazine 12.5 mg q8h PRN    Dose or Frequency CHANGE:  1) Trazodone - takes 150 mg nightly (not 100 mg)    REMOVED:   1) Atorvastatin  2) Venelex  3) DSS  4) Ferrous sulfate  5) Melatonin  6) B-12    Please call with any questions    [phone] (Main Pharmacy)    Current Outpatient Medications   Medication Instructions    acetaminophen (TYLENOL) 650 mg, Oral, EVERY 4 HOURS PRN    acetaminophen (TYLENOL) 650 mg, Oral, Nightly    albuterol sulfate HFA (PROVENTIL HFA) 108 (90 Base) MCG/ACT inhaler 2 puffs, Inhalation, EVERY 6 HOURS PRN    bisacodyl (DULCOLAX) 10 mg, Rectal, DAILY PRN    calcium carbonate (TUMS) 500 MG chewable tablet 1 tablet, Oral, 3 TIMES DAILY PRN    dicyclomine (BENTYL) 20 mg, Oral, 3 TIMES DAILY PRN    furosemide (LASIX) 40 mg, Oral, DAILY    gabapentin (NEURONTIN) 600 mg, Oral, 2 TIMES DAILY    guaiFENesin (MUCINEX) 600 mg, Oral, 2 TIMES DAILY PRN    lactulose (CHRONULAC) 20 g, Oral, EVERY 4 HOURS PRN    levothyroxine (SYNTHROID) 75 mcg, Oral, DAILY    loperamide (IMODIUM) 2 mg, Oral, 4 TIMES DAILY PRN    loratadine (CLARITIN) 10 mg, Oral, DAILY    menthol-zinc oxide (CALMOSEPTINE) 0.44-20.625 % OINT ointment 1 each, Topical, 2 times daily, To penis BID    Multiple Vitamins-Minerals (THERAPEUTIC MULTIVITAMIN-MINERALS) tablet 1 tablet, Oral, DAILY    Nutritional Supplements (RONEY PO) 1 packet, Oral, 2 TIMES DAILY    pantoprazole (PROTONIX) 40 mg, Oral, DAILY BEFORE  BREAKFAST    polyethylene glycol (MIRALAX) 17 g, Oral, SEE ADMIN INSTRUCTIONS, Daily and PRN    promethazine (PHENERGAN) 12.5 mg, Oral, EVERY 8 HOURS PRN    tamsulosin (FLOMAX) 0.4 mg, Oral, DAILY    traMADol (ULTRAM)  mg, Oral, EVERY 6 HOURS PRN    traZODone (DESYREL) 150 mg, Oral, NIGHTLY    vitamin D (CHOLECALCIFEROL) 2,000 Units, Oral, DAILY

## 2024-03-01 NOTE — PROGRESS NOTES
Speech Language Pathology  Facility/Department:[address] TELEMETRY  Bedside Swallow Evaluation                                                       Name: Tushar Block  : 1940  MRN: [id number]    Patient Diagnosis(es):   Patient Active Problem List    Diagnosis Date Noted    Normocytic anemia 2022    Electrolyte imbalance     Weight loss counseling, encounter for     Elevated sed rate     Elevated C-reactive protein (CRP)     Infection requiring contact isolation precautions     Class 1 obesity due to excess calories with body mass index (BMI) of 30.0 to 30.9 in adult     Chronic multifocal osteomyelitis of right foot (HCC) 2022    MRSA infection 2022    Septic arthritis of interphalangeal joint of toe of right foot ([facility]) 2022    Osteomyelitis ([facility]) 2022    Multiple drug resistant organism (MDRO) culture positive 2022    Sepsis ([facility]) 2024    Loculated pleural effusion 2024    Round atelectasis 2024    Sepsis secondary to UTI ([facility]) 2024    Symptomatic sinus bradycardia 2022    Bilateral leg edema 2022    Symptomatic bradycardia     Weakness 2022    Pain of right lower extremity     Degloving injury     Leg laceration, unspecified laterality, initial encounter 10/31/2021    Altered mental status     Hyperkalemia     Encephalopathy acute 2021    Acute renal failure superimposed on stage 3 chronic kidney disease (HCC) 02/15/2021    Urinary tract infection associated with indwelling urethral catheter ([facility]) 02/15/2021    Acute cystitis without hematuria 2021    Abnormal angiogram 2020    H/O angiography 2020    Abnormal stress test 2020    Constipation 2020    Encopresis with constipation and overflow incontinence 2020    Cellulitis of scrotum 2020    Acute renal injury (HCC) 2020    Pseudomonas infection 2020    Dyspnea     Bradycardia     Systolic congestive

## 2024-03-01 NOTE — ED NOTES
With help of PCA, this RN was cleaning pt after he had a bowel movement. While turning the pt, multiple areas of skin breakdown / skin tears noted on pt's back. The wounds on the upper back had dressings from  nursing home. Skin tears on pt's sacral area were covered with new mepilex dressings.     Provider notified of condition of pt's skin.      Andrei Martines RN  02/29/24 1939

## 2024-03-01 NOTE — CONSULTS
[facility] Nurse  Consult Note       NAME:  Tushar Block  MEDICAL RECORD NUMBER:  [id number]  AGE: 83 y.o.   GENDER: male  : 1940  TODAY'S DATE:  3/1/2024    Subjective   Reason for WOCN Evaluation and Assessment: Back & Buttocks - severe shearing      Tushar Block is a 83 y.o. male referred by:   [] Physician  [x] Nursing  [] Other:     Wound Identification:  Wound Type:  shearing  Contributing Factors: chronic pressure, decreased mobility, shear force, and incontinence of stool    Wound History: Tushar Block is a 83 y.o. male with pmh as mentioned above presents from his SNF with complaints of vomiting for the past 2 days.  Patient states it was nonbloody and nonbilious and has vomited about 5-6 times.  Patient has not been feeling well since he started vomiting with poor p.o. intake.  Denies chest or abdominal pain, SOB, urinary symptoms or changes in bowel habits.  Patient has a chronic indwelling Iglesias catheter with history of MDRO UTIs  Patient was found to be febrile to 102 °F, tachycardic, hypoxic upon presentation to ED and was noted to have a nproductive cough  Current Wound Care Treatment: Back & Buttocks - foam dressings    Patient Goal of Care:  [x] Wound Healing  [] Odor Control  [] Palliative Care  [] Pain Control   [] Other:         PAST MEDICAL HISTORY        Diagnosis Date    BPH (benign prostatic hyperplasia)     C. difficile colitis 2022    Carotid stenosis 2013    JESU 16-49% stenosis; LICA 50-79% stenosis    Cellulitis 2013    LLE    Chronic back pain     Encounter for imaging to screen for metal prior to MRI 2022    [employer]: Synchromed II pump for baclofen -lfe    GERD (gastroesophageal reflux disease)     History of atrial fibrillation     Hypertension     Lower GI bleed     MDRO (multiple drug resistant organisms) resistance 10/26/2019    urine    Neuromuscular disorder (HCC)     spasticity    Renal insufficiency     Septic arthritis of  01/02/24 Back-Dressing/Treatment: Triad hydro/zinc oxide-based hydrophilic paste  Wound 01/02/24 Buttocks-Dressing/Treatment: Triad hydro/zinc oxide-based hydrophilic paste  Buttocks & Back Wound Care:  Gently clean with NS  Apply Triad BID and prn  Turn pt every 2 hours  Call Wound Care for deterioration [phone]    Specialty Bed Required : Yes   [x] Low Air Loss   [x] Pressure Redistribution  [] Fluid Immersion  [] Bariatric  [] Total Pressure Relief  [] Other:     Current Diet: ADULT DIET; Full Liquid  Dietician consult:  No    Discharge Plan:  Placement for patient upon discharge: skilled nursing    Patient appropriate for Outpatient Wound Care Center: Yes    Referrals:  [x]   [] Home Health Care  [] Supplies  [] Other    Patient/Caregiver Teaching:  Level of patient/caregiver understanding able to:   [] Indicates understanding       [] Needs reinforcement  [] Unsuccessful      [] Verbal Understanding  [] Demonstrated understanding       [] No evidence of learning  [] Refused teaching         [] N/A       Electronically signed by Ayesha Gallardo RN, CWOCN on 3/1/2024 at 11:21 AM

## 2024-03-01 NOTE — CONSULTS
[facility]  Palliative Medicine Consultation Note      Date Of Admission:2/29/2024  Date of consult: 03/01/24  Seen by PC in the past:  Yes    Recommendations:        Pt is known to the palliative care team. He has been on services with [facility], however has been a full code on hospice.     Saw pt at the bedside, he stated \"Oh God, not another person!\" And asked me to leave him alone.     D/w Dr. Kothari. Will try to reach out to [facility] as pt will have to revoke hospice while admitted if he hasn't already.     1. Goals of Care/Advanced Care planning/Code status: Full code, continue with current management.   2. Pain: pt denied  3. SOB: pt denied  4. Disposition: Likely return to [facility] when medically ready for discharge    Reason for Consult:         [x]  Goals of Care  []  Code Status Discussion/Advanced Care Planning   []  Psychosocial/Family Support  []  Symptom Management  []  Other (Specify)    Requesting Physician: Dr. Kothari    CHIEF COMPLAINT:  vomiting    History Obtained From:  patient, electronic medical record    History of Present Illness:         Tushar Block is a 83 y.o. male with PMH of  essential hypertension, GERD, CAD, status post CABG, chronic indwelling Iglesias catheter, MDRO, MRSA infection, hyperlipidemia, paroxysmal A-fib, not on chronic anticoagulation due to prior GI bleed and many other chronic medical conditions  who presented with vomiting for two days. Additionally pt has been febrile and tachycardic to the 120s. He was admitted for sepsis likely 2/2 complicated UTI.     Subjective:         Past Medical History:        Diagnosis Date    BPH (benign prostatic hyperplasia)     C. difficile colitis 04/11/2022    Carotid stenosis 12/2013    JESU 16-49% stenosis; LICA 50-79% stenosis    Chronic back pain     Encounter for imaging to screen for metal prior to MRI 05/26/2022    [employer]: Synchromed II pump for baclofen -lfe    GERD  (gastroesophageal reflux disease)     History of atrial fibrillation     Hypertension     Lower GI bleed     MDRO (multiple drug resistant organisms) resistance 10/26/2019    urine    Neuromuscular disorder (HCC)     spasticity    Renal insufficiency     Septic arthritis of interphalangeal joint of toe of right foot (HCC) 05/27/2022    Systolic congestive heart failure ([facility])     Vitamin B12 deficiency        Past Surgical History:        Procedure Laterality Date    BACK SURGERY  2006    lower lumbar    BRONCHOSCOPY N/A 1/4/2024    BRONCHOSCOPY/TRANSBRONCHIAL LUNG BIOPSY/ CRYOPROBE BX/  BRUSHING /BAL performed by Nathaniel Celeste MD at [facility] ENDOSCOPY    CORONARY ARTERY BYPASS GRAFT  12/13/2013    CABG x 5 (Dr Green), svg to diag, om1 and 3, distal rca, kelly to lad.     CYSTOSCOPY N/A 1/10/2019    CYSTOSCOPY performed by Elia Mcfarland MD at [facility] OR    FOOT DEBRIDEMENT Left 5/29/2022    LEFT FOOT PARTIAL FIFTH RAY RESECTION WITH EXCISIONAL DEBRIDEMENT OF MUSCLE AND FASCIA, WITH APPLICATION OF GRAFT performed by Arnie Kruger DPM at [facility] OR    FOOT DEBRIDEMENT Right 9/2/2022    INCISION AND DRAINAGE PARTIAL 5TH RAY RESECTION RIGHT FOOT performed by Fauzia Richardson DPM at [facility] OR    FOOT SURGERY      HIP SURGERY Right 5/1/2015    ORIF    LEG SURGERY Right 11/2/2021    RIGHT LOWER EXTREMITY ADVANCEMENT FLAP AND SPLIT THICKNESS SKIN GRAFT PLACEMENT; (WOUND- 10 CM X 5.5 CM; CLOSURE- 6 CM X 5.5 CM; SKIN GRAFT- 7.2 CM X 5 CM) performed by Christiano Seymour MD at [facility] OR    SIGMOIDOSCOPY N/A 6/11/2020    SIGMOIDOSCOPY DIAGNOSTIC FLEXIBLE performed by Rex VALENTINE MD at [facility] ENDOSCOPY    TOE AMPUTATION Right 9/2/2022    . performed by Fauzia Richardson DPM at [facility] OR    UPPER GASTROINTESTINAL ENDOSCOPY N/A 5/4/2020    EGD BIOPSY performed by Rex VALENTINE MD at [facility] ENDOSCOPY    UPPER GASTROINTESTINAL ENDOSCOPY N/A 9/5/2022    EGD BIOPSY performed by Rex VALENTINE MD at [facility] ENDOSCOPY       Current

## 2024-03-01 NOTE — CONSULTS
Patient was seen and evaluated at bedside.  Agree with residents assessment and treatment plan.  Fauzia Richardson DPM

## 2024-03-01 NOTE — PLAN OF CARE
Problem: Safety - Adult  Goal: Free from fall injury  Outcome: Progressing    Bed on the lowest position, call lights within reach, bed alarm on. Will continue to monitor.     Problem: Respiratory - Adult  Goal: Achieves optimal ventilation and oxygenation  Outcome: Progressing    Patient on O2 2LPM saturating at 98%, no shortness of breath complaint. Plan to wean off from O@ tomorrow. Will continue to monitor.     Problem: Skin/Tissue Integrity - Adult  Goal: Skin integrity remains intact  Outcome: Progressing    Wound care nurse consult. Applied protective meplex, turn patient from side to as tolerated, protective barrier and meplex applied to patient back and sacrum.     Problem: Infection - Adult  Goal: Absence of infection at discharge  Outcome: Progressing    Patient is currently on IV antibiotics. Afebrile, VS stable. Will continue to monitor.

## 2024-03-01 NOTE — PROGRESS NOTES
Pt in bed, large BM- cleaned and turned to right side with wedge. Vitals stable O2 on 1Lnc upon assessment 89% turned up to 2Lnc with 2 min recovery to 94%. Call light in reach breakfast tray set up and assisted with feed. Chronic schneider cleaned with an output of 50ml at this time.

## 2024-03-01 NOTE — PROGRESS NOTES
Hospital Medicine Progress Note      Date of Admission: 2/29/2024  Hospital Day: 2    Chief Admission Complaint: Vomiting     Subjective: Patient was seen and evaluated at bedside.  Reports improvement in vomiting.  Complains of having some cough.  Did not have any events overnight.    Presenting Admission History:       83 y.o. male with a pmh of essential hypertension, GERD, CAD, status post CABG, chronic indwelling Iglesias catheter, MDRO, MRSA infection, hyperlipidemia, paroxysmal A-fib, not on chronic anticoagulation due to prior GI bleed and many other chronic medical conditions who presents from his SNF with complaints of vomiting for the past 2 days.  Patient was found to be febrile 102 °F, tachycardic to 120s with borderline blood pressure and required supplemental oxygen via nasal cannula-2 L/min  Patient has a cough, which she states difficult to bring up.    Assessment/Plan:      Current Principal Problem:  Sepsis (HCC)    Sepsis/urinary tract infection with indwelling Iglesias catheter/pneumonia/hypoxia/dysphagia  Extensive pressure wounds involving the back and legs  Dehydration/electrolyte abnormalities  Coronary disease status post CABG/paroxysmal A-fib not on anticoagulation  GERD/history of GI bleed/hypothyroidism  Goals of care?    Plan:  *Presented with concern for sepsis  Urinalysis concerning for UTI with indwelling chronic Iglesias catheter  Urine culture growing Proteus mirabilis  Requiring 2 L oxygen via nasal cannula, wean as tolerated  Chest x-ray concerning for pneumonia of the right lung  Due to concerns for dysphagia will consult speech therapy  Check strep, Legionella antigen, procalcitonin  On empiric IV vancomycin, cefepime, azithromycin day 2    *Patient seems to have extensive pressure wounds involving his back, and both legs  Wound care and podiatry on consult    *Presented with mild hyperkalemia and hyponatremia  Electrolyte imbalance improved since presentation with IV fluids  Will  provider] furosemide  40 mg Oral Daily    gabapentin  600 mg Oral BID    levothyroxine  75 mcg Oral Daily    pantoprazole  40 mg Oral QAM AC    traZODone  100 mg Oral Nightly    sodium chloride flush  5-40 mL IntraVENous 2 times per day    enoxaparin  40 mg SubCUTAneous Daily    cefepime  2,000 mg IntraVENous Q12H    azithromycin  500 mg IntraVENous Q24H     PRN Meds: albuterol sulfate HFA, guaiFENesin, sodium chloride flush, sodium chloride, ondansetron **OR** ondansetron, polyethylene glycol, acetaminophen **OR** acetaminophen, ipratropium 0.5 mg-albuterol 2.5 mg     Labs:  Personally reviewed and interpreted for clinical significance.     Recent Labs     02/29/24  1050 03/01/24  1215   WBC 12.8* 7.6   HGB 12.2* 9.1*   HCT 39.2* 29.3*    156     Recent Labs     02/29/24  1050 03/01/24  1215   * 140   K 5.2* 4.1   CL 99 108   CO2 24 23   BUN 70* 58*   CREATININE 1.2 1.0   CALCIUM 8.5 7.8*     Recent Labs     02/29/24  1050 02/29/24  1204   PROBNP 1,103*  --    TROPHS 113* 96*     No results for input(s): \"LABA1C\" in the last 72 hours.  Recent Labs     02/29/24  1050   AST 15   ALT 14   BILITOT 0.4   ALKPHOS 157*     No results for input(s): \"INR\", \"LACTA\", \"TSH\" in the last 72 hours.    Urine Cultures:   Lab Results   Component Value Date/Time    LABURIN 75,000 CFU/ml  ID and sensitivity to follow   02/29/2024 10:50 AM     Blood Cultures:   Lab Results   Component Value Date/Time    BC No Growth after 4 days of incubation. 01/05/2024 03:14 PM     Lab Results   Component Value Date/Time    BLOODCULT2 No Growth after 4 days of incubation. 01/05/2024 03:14 PM     Organism:   Lab Results   Component Value Date/Time    ORG Proteus mirabilis 02/29/2024 10:50 AM         Evan Kothari MD

## 2024-03-01 NOTE — PROGRESS NOTES
[facility] -  Clinical Pharmacy Note    Vancomycin - Management by Pharmacy    Consult Date(s): 2/29/24  Consulting Provider(s): Dr Jay    Assessment / Plan  1)  Pneumonia / complicated UTI- Vancomycin  Concurrent Antimicrobials: Azithromycin, cefepime  Day of Vanc Therapy / Ordered Duration: 2 of 5  Current Dosing Method: Bayesian-Guided AUC Dosing  Therapeutic Goal: -600 mg/L*hr  Current Dose / Plan:   SCr 1.2-->1.0 today; baseline appears to be ~0.6-0.7.  Received 2250mg IV x1 loading dose yesterday afternoon.  Random level today = 24.7 mcg/mL.    Will continue with 1000mg IV q24h - calculated AUC = 538 with trough = 15.7.  Will order repeat level for tomorrow AM to further assess dosing regimen.  Will continue to monitor clinical condition and make adjustments to regimen as appropriate.    Please call with questions--  Thanks--  Roxie Lugo, PharmD, BCPS, BCGP  [id number] ([facility])   3/1/2024 1:37 PM      Interval update:   Afebrile today.  Urine culture growing Proteus - sensitivities pending.    Subjective/Objective:   Tushar Block is a 83 y.o. male with a PMHx significant for essential hypertension, GERD, CAD  s/p CABG, chronic indwelling Iglesias catheter, MDRO, MRSA infection, hyperlipidemia, paroxysmal A-fib, not on chronic anticoagulation due to prior GI bleed and many other chronic medical conditions. Admitted 2/29 for hospital -acquired pneumonia and complicated UTI    Pharmacy is consulted to dose vancomycin    Vancomycin dosing history:  January 2024 - Pt was on 1000mg IV q12h, with level resulted in calculated AUC = 598.  Dose decreased to 750mg q12h, but Vancomycin was discontinued prior to obtaining repeat level.  SCr 0.6-0.7 and wt 94 kg at that time.  June 2022 - Pt was on 750mg IV q12h, with level resulting in calculated AUC = 474.  Pt SCr 0.6-0.7 and wt 100.4kg at that time.  May 2022 - Pt was on 750mg IV q12h, with level resulting in calculated AUC = 560.  Dose

## 2024-03-01 NOTE — PROGRESS NOTES
Patient is a poor historian unable to do medrec, day shift nurse leave voice mail for callback regarding medrec.

## 2024-03-01 NOTE — PROGRESS NOTES
4 Eyes Admission Assessment     I agree as the admission nurse that 2 RN's have performed a thorough Head to Toe Skin Assessment on the patient. ALL assessment sites listed below have been assessed on admission.       Areas assessed by both nurses:   [x]   Head, Face, and Ears   [x]   Shoulders, Back, and Chest  [x]   Arms, Elbows, and Hands   [x]   Coccyx, Sacrum, and Ischum  [x]   Legs, Feet, and Heels        Does the Patient have Skin Breakdown?  Yes a wound was noted on the Admission Assessment and an LDA was Initiated documentation include the Usha-wound, Wound Assessment, Measurements, Dressing Treatment, Drainage, and Color\",         Terrance Prevention initiated:  Yes   Wound Care Orders initiated:  Yes      WOC nurse consulted for Pressure Injury (Stage 3,4, Unstageable, DTI, NWPT, and Complex wounds):  Yes      Stage 2 on sacrum, Upper back, scab wounds on lower extremities. Redness on both heels    Nurse 1 eSignature: Electronically signed by Oren Machado RN on 2/29/24 at 11:42 PM EST    **SHARE this note so that the co-signing nurse is able to place an eSignature**    Nurse 2 eSignature: Electronically signed by Aida Wheeler RN on 3/1/24 at 12:15 AM EST

## 2024-03-02 ENCOUNTER — APPOINTMENT (OUTPATIENT)
Dept: GENERAL RADIOLOGY | Age: 84
DRG: 698 | End: 2024-03-02
Payer: MEDICARE

## 2024-03-02 PROBLEM — L97.211 VENOUS STASIS ULCER OF RIGHT CALF LIMITED TO BREAKDOWN OF SKIN WITHOUT VARICOSE VEINS (HCC): Status: ACTIVE | Noted: 2024-03-02

## 2024-03-02 PROBLEM — I87.2 VENOUS STASIS ULCER OF RIGHT CALF LIMITED TO BREAKDOWN OF SKIN WITHOUT VARICOSE VEINS (HCC): Status: ACTIVE | Noted: 2024-03-02

## 2024-03-02 LAB
ANION GAP SERPL CALCULATED.3IONS-SCNC: 10 MMOL/L (ref 3–16)
BACTERIA UR CULT: ABNORMAL
BUN SERPL-MCNC: 47 MG/DL (ref 7–20)
CALCIUM SERPL-MCNC: 8.2 MG/DL (ref 8.3–10.6)
CHLORIDE SERPL-SCNC: 112 MMOL/L (ref 99–110)
CO2 SERPL-SCNC: 22 MMOL/L (ref 21–32)
CREAT SERPL-MCNC: 0.9 MG/DL (ref 0.8–1.3)
DEPRECATED RDW RBC AUTO: 19.3 % (ref 12.4–15.4)
EST. AVERAGE GLUCOSE BLD GHB EST-MCNC: 105.4 MG/DL
GFR SERPLBLD CREATININE-BSD FMLA CKD-EPI: >60 ML/MIN/{1.73_M2}
GLUCOSE SERPL-MCNC: 96 MG/DL (ref 70–99)
HBA1C MFR BLD: 5.3 %
HCT VFR BLD AUTO: 31.6 % (ref 40.5–52.5)
HGB BLD-MCNC: 10 G/DL (ref 13.5–17.5)
LEGIONELLA AG UR QL: NORMAL
MCH RBC QN AUTO: 29 PG (ref 26–34)
MCHC RBC AUTO-ENTMCNC: 31.5 G/DL (ref 31–36)
MCV RBC AUTO: 91.8 FL (ref 80–100)
MRSA DNA SPEC QL NAA+PROBE: NORMAL
ORGANISM: ABNORMAL
PLATELET # BLD AUTO: 172 K/UL (ref 135–450)
PMV BLD AUTO: 8.6 FL (ref 5–10.5)
POTASSIUM SERPL-SCNC: 4.5 MMOL/L (ref 3.5–5.1)
PREALB SERPL-MCNC: 8.7 MG/DL (ref 20–40)
RBC # BLD AUTO: 3.44 M/UL (ref 4.2–5.9)
S PNEUM AG UR QL: NORMAL
SODIUM SERPL-SCNC: 144 MMOL/L (ref 136–145)
VANCOMYCIN SERPL-MCNC: 30.9 UG/ML
WBC # BLD AUTO: 9.4 K/UL (ref 4–11)

## 2024-03-02 PROCEDURE — 73590 X-RAY EXAM OF LOWER LEG: CPT

## 2024-03-02 PROCEDURE — 36415 COLL VENOUS BLD VENIPUNCTURE: CPT

## 2024-03-02 PROCEDURE — 2580000003 HC RX 258: Performed by: INTERNAL MEDICINE

## 2024-03-02 PROCEDURE — 6370000000 HC RX 637 (ALT 250 FOR IP): Performed by: INTERNAL MEDICINE

## 2024-03-02 PROCEDURE — 80202 ASSAY OF VANCOMYCIN: CPT

## 2024-03-02 PROCEDURE — 6360000002 HC RX W HCPCS: Performed by: INTERNAL MEDICINE

## 2024-03-02 PROCEDURE — 6370000000 HC RX 637 (ALT 250 FOR IP): Performed by: STUDENT IN AN ORGANIZED HEALTH CARE EDUCATION/TRAINING PROGRAM

## 2024-03-02 PROCEDURE — 1200000000 HC SEMI PRIVATE

## 2024-03-02 PROCEDURE — 80048 BASIC METABOLIC PNL TOTAL CA: CPT

## 2024-03-02 PROCEDURE — 85027 COMPLETE CBC AUTOMATED: CPT

## 2024-03-02 RX ORDER — GUAIFENESIN 600 MG/1
600 TABLET, EXTENDED RELEASE ORAL 2 TIMES DAILY
Status: DISCONTINUED | OUTPATIENT
Start: 2024-03-02 | End: 2024-03-09 | Stop reason: HOSPADM

## 2024-03-02 RX ADMIN — SODIUM CHLORIDE, PRESERVATIVE FREE 10 ML: 5 INJECTION INTRAVENOUS at 09:53

## 2024-03-02 RX ADMIN — SODIUM CHLORIDE, PRESERVATIVE FREE 10 ML: 5 INJECTION INTRAVENOUS at 21:37

## 2024-03-02 RX ADMIN — GABAPENTIN 600 MG: 600 TABLET, FILM COATED ORAL at 09:52

## 2024-03-02 RX ADMIN — AZITHROMYCIN MONOHYDRATE 500 MG: 500 INJECTION, POWDER, LYOPHILIZED, FOR SOLUTION INTRAVENOUS at 12:55

## 2024-03-02 RX ADMIN — PANTOPRAZOLE SODIUM 40 MG: 40 TABLET, DELAYED RELEASE ORAL at 06:11

## 2024-03-02 RX ADMIN — LEVOTHYROXINE SODIUM 75 MCG: 0.07 TABLET ORAL at 06:11

## 2024-03-02 RX ADMIN — GABAPENTIN 600 MG: 600 TABLET, FILM COATED ORAL at 21:37

## 2024-03-02 RX ADMIN — TRAZODONE HYDROCHLORIDE 150 MG: 100 TABLET ORAL at 21:37

## 2024-03-02 RX ADMIN — CEFEPIME 2000 MG: 2 INJECTION, POWDER, FOR SOLUTION INTRAVENOUS at 23:45

## 2024-03-02 RX ADMIN — VANCOMYCIN HYDROCHLORIDE 1000 MG: 10 INJECTION, POWDER, LYOPHILIZED, FOR SOLUTION INTRAVENOUS at 18:30

## 2024-03-02 RX ADMIN — ENOXAPARIN SODIUM 40 MG: 100 INJECTION SUBCUTANEOUS at 09:52

## 2024-03-02 RX ADMIN — CEFEPIME 2000 MG: 2 INJECTION, POWDER, FOR SOLUTION INTRAVENOUS at 12:54

## 2024-03-02 RX ADMIN — GUAIFENESIN 600 MG: 600 TABLET ORAL at 21:37

## 2024-03-02 RX ADMIN — SODIUM CHLORIDE: 9 INJECTION, SOLUTION INTRAVENOUS at 23:44

## 2024-03-02 NOTE — CONSULTS
Clinical Pharmacy Progress Note    Vancomycin has been discontinued. Pharmacy will sign off. Please re-consult pharmacy if vancomycin dosing is wanted in the future.    Please call with questions.  Nikki Eduardo PharmD  [id number] Resident   Ext. [phone]  3/2/2024 9:12 AM

## 2024-03-02 NOTE — PROGRESS NOTES
No Growth to date.  Any change in status will be called.     Organism:   Lab Results   Component Value Date/Time    ORG Proteus mirabilis 02/29/2024 10:50 AM         Evan Kothari MD   Please note: All documentation, including physical exam and assessment/plan, from prior was copied and carried forward from encounter on 3/1/2024.  New findings and changes have been reviewed and updated appropriately to reflect today's encounter.

## 2024-03-02 NOTE — PROGRESS NOTES
Podiatric Surgery Daily Progress Note  Tushar Block      Subjective :   Patient seen and examined this am at the bedside. Patient denies any acute overnight events. Patient denies N/V/F/C/SOB. Patient denies calf pain, thigh pain, chest pain.     Review of Systems: A 12 point review of symptoms is unremarkable with the exception of the chief complaint. Patient specifically denies nausea, fever, vomiting, chills, shortness of breath, chest pain, abdominal pain, constipation or difficulty urinating.       Objective     BP (!) 152/69   Pulse (!) 101   Temp 99.1 °F (37.3 °C) (Axillary)   Resp 18   Ht 1.829 m (6')   Wt 94 kg (207 lb 3.7 oz)   SpO2 96%   BMI 28.11 kg/m²      I/O:  Intake/Output Summary (Last 24 hours) at 3/2/2024 0639  Last data filed at 3/2/2024 0338  Gross per 24 hour   Intake 2330 ml   Output 1425 ml   Net 905 ml              Wt Readings from Last 3 Encounters:   03/02/24 94 kg (207 lb 3.7 oz)   01/06/24 96.6 kg (212 lb 15.4 oz)   05/25/23 90.7 kg (200 lb)       LABS:    Recent Labs     03/01/24  1215 03/02/24  0546   WBC 7.6 9.4   HGB 9.1* 10.0*   HCT 29.3* 31.6*    172        Recent Labs     03/01/24  1215      K 4.1      CO2 23   BUN 58*   CREATININE 1.0        Recent Labs     02/29/24  1050   PROT 9.2*           LOWER EXTREMITY EXAMINATION    Dressing to b/l LE intact. No strikethrough noted to the external dressing. Scant amount of serosanguinous drainage noted to the internal layers of the dressing.     VASCULAR: DP and PT pulses faintly palpable. Upon handheld doppler examination the DP and PT are noted to be biphasic bilaterally. CFT is brisk to the digits of the foot b/l. Skin temperature is warm to cool from proximal to distal with no focal calor noted. Moderate 1+ pitting edema noted. No pain with calf compression b/l.     NEUROLOGIC: Gross and epicritic sensation is diminished b/l. Protective sensation is diminished at all pedal sites b/l.     DERMATOLOGIC:  pending  -Wound culture: not obtained 2/2 no acute signs of infection  -Blood culture: not indicated from a podiatric standpoint  -RLE dressed with adaptic, gauze, kerlix, ACE  -LLE dressed with mepilex border  -Mepilex heel border is in place to the bilateral lower extremity as a preventative measure  -Antibiotics not indicated from a podiatric standpoint. However noted that the patient has multiple infectious processes present.   -Patient is non-ambulatory at base line  -Please continue to offload the patient's heels with Mepilex borders, Prevalon boots, heels floated off the bed utilizing pillows.  Due to patient's nonambulatory status it is crucial to offload the heels to prevent further decubitus ulceration.        DISPO: Bilateral venous stasis ulcerations,improving.  Labs reviewed.  X-rays pending.  Antibiotics not indicated from a podiatric standpoint.  Low suspicion that the bilateral lower extremity is the source of the patient's sepsis.  Crucial to offload patient's heels during this admission with use of either prevalon boots or heels floated off the bed with use of pillows.  Podiatry will continue with local wound care while in house.    Patient seen and evaluated bedside with Dr. Raheem Spicer DPM.    Tanna Mccord DPM   Podiatric Resident PGY2  Pager [phone] or Ramandeep  3/2/2024, 6:41 AM

## 2024-03-02 NOTE — PLAN OF CARE
Problem: Discharge Planning  Goal: Discharge to home or other facility with appropriate resources  Outcome: Progressing     Problem: Skin/Tissue Integrity  Goal: Absence of new skin breakdown  Description: 1.  Monitor for areas of redness and/or skin breakdown  2.  Assess vascular access sites hourly  3.  Every 4-6 hours minimum:  Change oxygen saturation probe site  4.  Every 4-6 hours:  If on nasal continuous positive airway pressure, respiratory therapy assess nares and determine need for appliance change or resting period.  Outcome: Progressing     Problem: Safety - Adult  Goal: Free from fall injury  Outcome: Progressing     Problem: Respiratory - Adult  Goal: Achieves optimal ventilation and oxygenation  Outcome: Progressing     Problem: Skin/Tissue Integrity - Adult  Goal: Skin integrity remains intact  Outcome: Progressing     Problem: Infection - Adult  Goal: Absence of infection at discharge  Outcome: Progressing  Goal: Absence of infection during hospitalization  Outcome: Progressing     Problem: Chronic Conditions and Co-morbidities  Goal: Patient's chronic conditions and co-morbidity symptoms are monitored and maintained or improved  Outcome: Progressing     Problem: SLP Adult - Impaired Swallowing  Goal: By Discharge: Advance to least restrictive diet without signs or symptoms of aspiration for planned discharge setting.  See evaluation for individualized goals.  3/1/2024 1514 by Sierra Marti, SLP  Outcome: Progressing  3/1/2024 1514 by Sierra Marti, SLP  Outcome: Progressing

## 2024-03-03 ENCOUNTER — APPOINTMENT (OUTPATIENT)
Dept: GENERAL RADIOLOGY | Age: 84
DRG: 698 | End: 2024-03-03
Payer: MEDICARE

## 2024-03-03 LAB
ANION GAP SERPL CALCULATED.3IONS-SCNC: 7 MMOL/L (ref 3–16)
BUN SERPL-MCNC: 35 MG/DL (ref 7–20)
CALCIUM SERPL-MCNC: 8.1 MG/DL (ref 8.3–10.6)
CHLORIDE SERPL-SCNC: 110 MMOL/L (ref 99–110)
CO2 SERPL-SCNC: 24 MMOL/L (ref 21–32)
CREAT SERPL-MCNC: 0.9 MG/DL (ref 0.8–1.3)
DEPRECATED RDW RBC AUTO: 18.2 % (ref 12.4–15.4)
GFR SERPLBLD CREATININE-BSD FMLA CKD-EPI: >60 ML/MIN/{1.73_M2}
GLUCOSE BLD-MCNC: 112 MG/DL (ref 70–99)
GLUCOSE SERPL-MCNC: 92 MG/DL (ref 70–99)
HCT VFR BLD AUTO: 28 % (ref 40.5–52.5)
HGB BLD-MCNC: 8.9 G/DL (ref 13.5–17.5)
MCH RBC QN AUTO: 28.2 PG (ref 26–34)
MCHC RBC AUTO-ENTMCNC: 31.9 G/DL (ref 31–36)
MCV RBC AUTO: 88.4 FL (ref 80–100)
PERFORMED ON: ABNORMAL
PLATELET # BLD AUTO: 154 K/UL (ref 135–450)
PMV BLD AUTO: 8.5 FL (ref 5–10.5)
POTASSIUM SERPL-SCNC: 4.2 MMOL/L (ref 3.5–5.1)
RBC # BLD AUTO: 3.17 M/UL (ref 4.2–5.9)
SODIUM SERPL-SCNC: 141 MMOL/L (ref 136–145)
WBC # BLD AUTO: 7.1 K/UL (ref 4–11)

## 2024-03-03 PROCEDURE — 71045 X-RAY EXAM CHEST 1 VIEW: CPT

## 2024-03-03 PROCEDURE — 85027 COMPLETE CBC AUTOMATED: CPT

## 2024-03-03 PROCEDURE — 1200000000 HC SEMI PRIVATE

## 2024-03-03 PROCEDURE — 80048 BASIC METABOLIC PNL TOTAL CA: CPT

## 2024-03-03 PROCEDURE — 6370000000 HC RX 637 (ALT 250 FOR IP): Performed by: INTERNAL MEDICINE

## 2024-03-03 PROCEDURE — 6370000000 HC RX 637 (ALT 250 FOR IP): Performed by: STUDENT IN AN ORGANIZED HEALTH CARE EDUCATION/TRAINING PROGRAM

## 2024-03-03 PROCEDURE — 51702 INSERT TEMP BLADDER CATH: CPT

## 2024-03-03 PROCEDURE — 2580000003 HC RX 258: Performed by: STUDENT IN AN ORGANIZED HEALTH CARE EDUCATION/TRAINING PROGRAM

## 2024-03-03 PROCEDURE — 6360000002 HC RX W HCPCS: Performed by: INTERNAL MEDICINE

## 2024-03-03 PROCEDURE — 2580000003 HC RX 258: Performed by: INTERNAL MEDICINE

## 2024-03-03 PROCEDURE — 36415 COLL VENOUS BLD VENIPUNCTURE: CPT

## 2024-03-03 PROCEDURE — 6370000000 HC RX 637 (ALT 250 FOR IP): Performed by: FAMILY MEDICINE

## 2024-03-03 PROCEDURE — 6360000002 HC RX W HCPCS: Performed by: STUDENT IN AN ORGANIZED HEALTH CARE EDUCATION/TRAINING PROGRAM

## 2024-03-03 RX ORDER — BENZONATATE 100 MG/1
100 CAPSULE ORAL 3 TIMES DAILY PRN
Status: DISCONTINUED | OUTPATIENT
Start: 2024-03-03 | End: 2024-03-09 | Stop reason: HOSPADM

## 2024-03-03 RX ADMIN — SODIUM CHLORIDE, PRESERVATIVE FREE 10 ML: 5 INJECTION INTRAVENOUS at 09:40

## 2024-03-03 RX ADMIN — GUAIFENESIN 600 MG: 600 TABLET ORAL at 09:40

## 2024-03-03 RX ADMIN — Medication 1 LOZENGE: at 06:41

## 2024-03-03 RX ADMIN — TRAZODONE HYDROCHLORIDE 150 MG: 100 TABLET ORAL at 21:26

## 2024-03-03 RX ADMIN — LEVOTHYROXINE SODIUM 75 MCG: 0.07 TABLET ORAL at 06:08

## 2024-03-03 RX ADMIN — CEFTRIAXONE 1000 MG: 1 INJECTION, POWDER, FOR SOLUTION INTRAMUSCULAR; INTRAVENOUS at 09:43

## 2024-03-03 RX ADMIN — GUAIFENESIN 600 MG: 600 TABLET ORAL at 21:26

## 2024-03-03 RX ADMIN — ENOXAPARIN SODIUM 40 MG: 100 INJECTION SUBCUTANEOUS at 09:39

## 2024-03-03 RX ADMIN — PANTOPRAZOLE SODIUM 40 MG: 40 TABLET, DELAYED RELEASE ORAL at 06:08

## 2024-03-03 RX ADMIN — AZITHROMYCIN MONOHYDRATE 500 MG: 500 INJECTION, POWDER, LYOPHILIZED, FOR SOLUTION INTRAVENOUS at 13:28

## 2024-03-03 RX ADMIN — GABAPENTIN 600 MG: 600 TABLET, FILM COATED ORAL at 09:40

## 2024-03-03 RX ADMIN — GABAPENTIN 600 MG: 600 TABLET, FILM COATED ORAL at 21:26

## 2024-03-03 RX ADMIN — BENZONATATE 100 MG: 100 CAPSULE ORAL at 06:41

## 2024-03-03 ASSESSMENT — PAIN SCALES - WONG BAKER
WONGBAKER_NUMERICALRESPONSE: 0

## 2024-03-03 ASSESSMENT — PAIN - FUNCTIONAL ASSESSMENT: PAIN_FUNCTIONAL_ASSESSMENT: PREVENTS OR INTERFERES SOME ACTIVE ACTIVITIES AND ADLS

## 2024-03-03 ASSESSMENT — PAIN SCALES - GENERAL
PAINLEVEL_OUTOF10: 0

## 2024-03-03 ASSESSMENT — PAIN DESCRIPTION - LOCATION: LOCATION: THROAT

## 2024-03-03 ASSESSMENT — PAIN DESCRIPTION - DESCRIPTORS: DESCRIPTORS: DISCOMFORT;SORE

## 2024-03-03 NOTE — PLAN OF CARE
Problem: Safety - Adult  Goal: Free from fall injury  Outcome: Progressing  Note:  Remains free from falls, bed in low position, call light in reach, bed alarm on for safety, will continue to monitor.     Problem: Pain  Goal: Verbalizes/displays adequate comfort level or baseline comfort level  Outcome: Progressing  Note:  Denies pain/needs, will continue to monitor.

## 2024-03-03 NOTE — PROGRESS NOTES
Right lower extremity:    Diffuse dermatological changes consistent with venous insufficiency to the dorsum of the right foot.  Well adhered cicatrix appreciated along the lateral aspect of the right foot from previous fifth ray amputation.     Multiple superficial abrasions appreciated to the anterior lateral aspect of the right lower extremity.  Most of the abrasions are well adhered eschars with several having a granular base to them.  There is chronic dermatological changes with hyperpigmentation to the periwounds.  There is no evidence of erythema, chlor or other acute signs of infection.  White crust 2/2 previous dressing noted along the anterior aspect of the legs.  No fluctuance, crepitus, malodor or purulence noted.  There is no tunneling, tracking or probe to bone appreciated.     Left lower extremity:    Diffuse dermatological changes consistent with venous insufficiency to the dorsum of the right foot.  Well adhered cicatrix appreciated along the lateral aspect of the left foot from previous fifth ray amputation.      Partial-thickness ulceration appreciated to the anterior left shin.  The wound base is noted to be dry eschar.  Periwound is unremarkable without erythema, color.  No evidence of fluctuance, crepitus malodor or purulence.  Wound does not tunnel, track or probe to bone.     MUSCULOSKELETAL: Muscle strength is 5/5 for all pedal groups tested. No pain with palpation of the foot or ankle b/l. Ankle joint ROM is decreased in dorsiflexion with the knee extended. 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 There is osteoporosis. There are areas of soft tissue irregularity overlying the lateral aspect of the mid to distal third fibula. There are dense vascular calcifications. No radiopaque foreign body or soft   tissue gas.     IMPRESSION:     No acute osseous abnormality.     Soft tissue irregularity as  detailed compatible with provided history of wounds.    XR TIB/FIB LEFT 3/2/24:  FINDINGS:     There is osteoporosis. Dense vascular calcifications. No soft tissue gas or radiopaque foreign body. No acute osseous abnormality. The soft tissues are normal.     IMPRESSION:     No acute osseous abnormality.       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 No AM labs  -ESR 46 and CRP 99.3  -Pre-albumin 8.7, adult oral nutritional wound healing supplement ordered  -X-rays reviewed with impressions noted above,   -Wound culture: not obtained 2/2 no acute signs of infection  -Blood culture: not indicated from a podiatric standpoint  -RLE dressed with adaptic, gauze, kerlix, ACE  -LLE dressed with mepilex border  -Mepilex heel border intact to the b/l heels as a preventative measure.   -Antibiotics not indicated from a podiatric standpoint.   -Patient is non-ambulatory at base line  -Please continue to offload the patient's heels with Mepilex borders, Prevalon boots, heels floated off the bed utilizing pillows.  Due to patient's nonambulatory status it is crucial to offload the heels to prevent further decubitus ulceration.        DISPO: Bilateral venous stasis ulcerations, improving.  Labs reviewedand imaging reviewed. Antibiotics not indicated from a podiatric standpoint.  Low suspicion that the bilateral lower extremity is the source of the patient's sepsis.  Crucial to offload patient's heels during this admission with use of either prevalon boots or heels floated off the bed with use of pillows.  Podiatry will continue with local wound care while in house.    Patient seen and evaluated bedside with Dr. Raheem Spicer DPM.    Tanna Mccord DPM   Podiatric Resident PGY2  Pager [phone] or Ramandeep  3/3/2024, 6:43 AM

## 2024-03-03 NOTE — DISCHARGE INSTRUCTIONS
Podiatry Wound Care Discharge Instructions  Please perform every  day dressing changes to bilateral lower extremity as follows  -Apply adaptic or other non-adherent to the wound on the  right lower leg  -Next apply gauze to the top of the right foot, ankle, and leg  -Next loosely wrap the right lower extremity with Kerlix starting from just in front of the toes and ending just below the knee  -Next gently wrap the right foot with 4 inch Ace bandage starting from just in front of the toes and ending just above the ankle  -Next gently wrap the right leg with 6 inch Ace bandage starting from just above the ankle and ending just below the knee  -Apply a mepilex bordered gauze to the wound on the left anterior shin  Patient is full weightbearing Bilateral lower extremity  Please follow-up with Dr. Fauzia Richardson DPM for wound care

## 2024-03-03 NOTE — PROGRESS NOTES
Component Value Date/Time    BLOODCULT2  02/29/2024 10:50 AM     No Growth to date.  Any change in status will be called.     Organism:   Lab Results   Component Value Date/Time    ORG Proteus mirabilis 02/29/2024 10:50 AM         Evan Kothari MD   Please note: All documentation, including physical exam and assessment/plan, from prior was copied and carried forward from encounter on 3/2/2024.  New findings and changes have been reviewed and updated appropriately to reflect today's encounter.

## 2024-03-03 NOTE — PLAN OF CARE
Problem: Safety - Adult  Goal: Free from fall injury  3/3/2024 0746 by Susanna Recinos RN  Outcome: Progressing  Flowsheets (Taken 3/3/2024 0746)  Free From Fall Injury:   Instruct family/caregiver on patient safety   Based on caregiver fall risk screen, instruct family/caregiver to ask for assistance with transferring infant if caregiver noted to have fall risk factors  3/3/2024 0237 by Gi Tesfaye RN  Outcome: Progressing     Problem: Pain  Goal: Verbalizes/displays adequate comfort level or baseline comfort level  3/3/2024 0746 by Susanna Recinos RN  Outcome: Progressing  Flowsheets (Taken 3/3/2024 0746)  Verbalizes/displays adequate comfort level or baseline comfort level:   Encourage patient to monitor pain and request assistance   Administer analgesics based on type and severity of pain and evaluate response   Consider cultural and social influences on pain and pain management   Assess pain using appropriate pain scale   Implement non-pharmacological measures as appropriate and evaluate response   Notify Licensed Independent Practitioner if interventions unsuccessful or patient reports new pain  3/3/2024 0237 by Gi Tesfaye RN  Outcome: Progressing

## 2024-03-03 NOTE — DISCHARGE INSTR - COC
Continuity of Care Form    Patient Name: Tushar Block   :  1940  MRN:  [id number]    Admit date:  2024  Discharge date:  ***    Code Status Order: Full Code   Advance Directives:     Admitting Physician:  Christian Jay MD  PCP: Mario Alberto Tran    Discharging Nurse: ***  Discharging Hospital Unit/Room#: 6322/6322-01  Discharging Unit Phone Number: ***    Emergency Contact:   Extended Emergency Contact Information  Primary Emergency Contact: Jourdan Block  Home Phone: [phone]  Relation: Child  Secondary Emergency Contact: Misti Block  Home Phone: [phone]  Work Phone: [phone]  Mobile Phone: [phone]  Relation: Child    Past Surgical History:  Past Surgical History:   Procedure Laterality Date    BACK SURGERY  2006    lower lumbar    BRONCHOSCOPY N/A 2024    BRONCHOSCOPY/TRANSBRONCHIAL LUNG BIOPSY/ CRYOPROBE BX/  BRUSHING /BAL performed by Nathaniel Celeste MD at [facility] ENDOSCOPY    CORONARY ARTERY BYPASS GRAFT  2013    CABG x 5 (Dr Green), svg to diag, om1 and 3, distal rca, kelly to lad.     CYSTOSCOPY N/A 1/10/2019    CYSTOSCOPY performed by Elia Mcfarland MD at [facility] OR    FOOT DEBRIDEMENT Left 2022    LEFT FOOT PARTIAL FIFTH RAY RESECTION WITH EXCISIONAL DEBRIDEMENT OF MUSCLE AND FASCIA, WITH APPLICATION OF GRAFT performed by Arnie Kruger DPM at [facility] OR    FOOT DEBRIDEMENT Right 2022    INCISION AND DRAINAGE PARTIAL 5TH RAY RESECTION RIGHT FOOT performed by Fauzia Richardson DPM at [facility] OR    FOOT SURGERY      HIP SURGERY Right 2015    ORIF    LEG SURGERY Right 2021    RIGHT LOWER EXTREMITY ADVANCEMENT FLAP AND SPLIT THICKNESS SKIN GRAFT PLACEMENT; (WOUND- 10 CM X 5.5 CM; CLOSURE- 6 CM X 5.5 CM; SKIN GRAFT- 7.2 CM X 5 CM) performed by Christiano Seymour MD at [facility] OR    SIGMOIDOSCOPY N/A 2020    SIGMOIDOSCOPY DIAGNOSTIC FLEXIBLE performed by Rex VALENTINE MD at [facility] ENDOSCOPY    TOE AMPUTATION Right 2022    . performed by  Discharge: Stable    Rehab Potential (if transferring to Rehab): Guarded    Recommended Labs or Other Treatments After Discharge: Respiratory status. Hemoglobin levels. Heart rate.   Podiatry Wound Care Discharge Instructions  Please perform every  day dressing changes to bilateral lower extremity as follows  -Apply adaptic or other non-adherent to the wound on the  right lower leg  -Next apply gauze to the top of the right foot, ankle, and leg  -Next loosely wrap the right lower extremity with Kerlix starting from just in front of the toes and ending just below the knee  -Next gently wrap the right foot with 4 inch Ace bandage starting from just in front of the toes and ending just above the ankle  -Next gently wrap the right leg with 6 inch Ace bandage starting from just above the ankle and ending just below the knee  -Apply a mepilex bordered gauze to the wound on the left anterior shin  Patient is full weightbearing Bilateral lower extremity  Please follow-up with Dr. Fauzia Richardson DPM for wound care     Physician Certification: I certify the above information and transfer of Tushar Block  is necessary for the continuing treatment of the diagnosis listed and that he requires Assisted Living for greater 30 days.     Update Admission H&P: No change in H&P    PHYSICIAN SIGNATURE:  Electronically signed by Jonathon Mendoza MD on 3/9/24 at 12:05 PM EST

## 2024-03-03 NOTE — PROGRESS NOTES
D:  Patient is complaining of cough.  Please note, there are no PRN orders for this.  Would you consider Tessalon PRN?  Please advise.  Thank you.  A:  Notified provider, Rios Kirk MD.

## 2024-03-04 LAB
ANION GAP SERPL CALCULATED.3IONS-SCNC: 12 MMOL/L (ref 3–16)
BACTERIA BLD CULT ORG #2: NORMAL
BACTERIA BLD CULT: NORMAL
BUN SERPL-MCNC: 29 MG/DL (ref 7–20)
CALCIUM SERPL-MCNC: 7.9 MG/DL (ref 8.3–10.6)
CHLORIDE SERPL-SCNC: 109 MMOL/L (ref 99–110)
CO2 SERPL-SCNC: 19 MMOL/L (ref 21–32)
CREAT SERPL-MCNC: 0.9 MG/DL (ref 0.8–1.3)
DEPRECATED RDW RBC AUTO: 19 % (ref 12.4–15.4)
GFR SERPLBLD CREATININE-BSD FMLA CKD-EPI: >60 ML/MIN/{1.73_M2}
GLUCOSE SERPL-MCNC: 95 MG/DL (ref 70–99)
HCT VFR BLD AUTO: 28.4 % (ref 40.5–52.5)
HGB BLD-MCNC: 8.8 G/DL (ref 13.5–17.5)
MCH RBC QN AUTO: 28.6 PG (ref 26–34)
MCHC RBC AUTO-ENTMCNC: 31 G/DL (ref 31–36)
MCV RBC AUTO: 92.3 FL (ref 80–100)
PLATELET # BLD AUTO: 161 K/UL (ref 135–450)
PMV BLD AUTO: 8.2 FL (ref 5–10.5)
POTASSIUM SERPL-SCNC: 4.3 MMOL/L (ref 3.5–5.1)
RBC # BLD AUTO: 3.08 M/UL (ref 4.2–5.9)
SODIUM SERPL-SCNC: 140 MMOL/L (ref 136–145)
WBC # BLD AUTO: 8.4 K/UL (ref 4–11)

## 2024-03-04 PROCEDURE — 36415 COLL VENOUS BLD VENIPUNCTURE: CPT

## 2024-03-04 PROCEDURE — 51702 INSERT TEMP BLADDER CATH: CPT

## 2024-03-04 PROCEDURE — 6370000000 HC RX 637 (ALT 250 FOR IP): Performed by: STUDENT IN AN ORGANIZED HEALTH CARE EDUCATION/TRAINING PROGRAM

## 2024-03-04 PROCEDURE — 1200000000 HC SEMI PRIVATE

## 2024-03-04 PROCEDURE — 6370000000 HC RX 637 (ALT 250 FOR IP): Performed by: FAMILY MEDICINE

## 2024-03-04 PROCEDURE — 6370000000 HC RX 637 (ALT 250 FOR IP): Performed by: INTERNAL MEDICINE

## 2024-03-04 PROCEDURE — 6360000002 HC RX W HCPCS: Performed by: INTERNAL MEDICINE

## 2024-03-04 PROCEDURE — 2700000000 HC OXYGEN THERAPY PER DAY

## 2024-03-04 PROCEDURE — 6360000002 HC RX W HCPCS: Performed by: STUDENT IN AN ORGANIZED HEALTH CARE EDUCATION/TRAINING PROGRAM

## 2024-03-04 PROCEDURE — 94640 AIRWAY INHALATION TREATMENT: CPT

## 2024-03-04 PROCEDURE — 2580000003 HC RX 258: Performed by: INTERNAL MEDICINE

## 2024-03-04 PROCEDURE — 85027 COMPLETE CBC AUTOMATED: CPT

## 2024-03-04 PROCEDURE — 2580000003 HC RX 258: Performed by: STUDENT IN AN ORGANIZED HEALTH CARE EDUCATION/TRAINING PROGRAM

## 2024-03-04 PROCEDURE — 94761 N-INVAS EAR/PLS OXIMETRY MLT: CPT

## 2024-03-04 PROCEDURE — 80048 BASIC METABOLIC PNL TOTAL CA: CPT

## 2024-03-04 RX ORDER — FUROSEMIDE 10 MG/ML
20 INJECTION INTRAMUSCULAR; INTRAVENOUS 2 TIMES DAILY
Status: DISCONTINUED | OUTPATIENT
Start: 2024-03-04 | End: 2024-03-07

## 2024-03-04 RX ORDER — IPRATROPIUM BROMIDE AND ALBUTEROL SULFATE 2.5; .5 MG/3ML; MG/3ML
1 SOLUTION RESPIRATORY (INHALATION)
Status: DISCONTINUED | OUTPATIENT
Start: 2024-03-04 | End: 2024-03-05

## 2024-03-04 RX ADMIN — GUAIFENESIN 600 MG: 600 TABLET ORAL at 08:16

## 2024-03-04 RX ADMIN — ENOXAPARIN SODIUM 40 MG: 100 INJECTION SUBCUTANEOUS at 08:15

## 2024-03-04 RX ADMIN — SODIUM CHLORIDE, PRESERVATIVE FREE 10 ML: 5 INJECTION INTRAVENOUS at 00:41

## 2024-03-04 RX ADMIN — LEVOTHYROXINE SODIUM 75 MCG: 0.07 TABLET ORAL at 06:38

## 2024-03-04 RX ADMIN — GUAIFENESIN 600 MG: 600 TABLET ORAL at 21:11

## 2024-03-04 RX ADMIN — SODIUM CHLORIDE, PRESERVATIVE FREE 10 ML: 5 INJECTION INTRAVENOUS at 21:09

## 2024-03-04 RX ADMIN — CEFTRIAXONE 1000 MG: 1 INJECTION, POWDER, FOR SOLUTION INTRAMUSCULAR; INTRAVENOUS at 11:51

## 2024-03-04 RX ADMIN — IPRATROPIUM BROMIDE AND ALBUTEROL SULFATE 1 DOSE: 2.5; .5 SOLUTION RESPIRATORY (INHALATION) at 00:53

## 2024-03-04 RX ADMIN — AZITHROMYCIN MONOHYDRATE 500 MG: 500 INJECTION, POWDER, LYOPHILIZED, FOR SOLUTION INTRAVENOUS at 13:35

## 2024-03-04 RX ADMIN — PANTOPRAZOLE SODIUM 40 MG: 40 TABLET, DELAYED RELEASE ORAL at 06:38

## 2024-03-04 RX ADMIN — IPRATROPIUM BROMIDE AND ALBUTEROL SULFATE 1 DOSE: 2.5; .5 SOLUTION RESPIRATORY (INHALATION) at 17:15

## 2024-03-04 RX ADMIN — FUROSEMIDE 40 MG: 40 TABLET ORAL at 09:45

## 2024-03-04 RX ADMIN — BENZONATATE 100 MG: 100 CAPSULE ORAL at 00:40

## 2024-03-04 RX ADMIN — IPRATROPIUM BROMIDE AND ALBUTEROL SULFATE 1 DOSE: 2.5; .5 SOLUTION RESPIRATORY (INHALATION) at 21:48

## 2024-03-04 RX ADMIN — Medication 1 LOZENGE: at 00:40

## 2024-03-04 RX ADMIN — TRAZODONE HYDROCHLORIDE 150 MG: 100 TABLET ORAL at 21:11

## 2024-03-04 RX ADMIN — FUROSEMIDE 20 MG: 10 INJECTION, SOLUTION INTRAMUSCULAR; INTRAVENOUS at 18:28

## 2024-03-04 RX ADMIN — GABAPENTIN 600 MG: 600 TABLET, FILM COATED ORAL at 08:16

## 2024-03-04 RX ADMIN — GABAPENTIN 600 MG: 600 TABLET, FILM COATED ORAL at 21:11

## 2024-03-04 ASSESSMENT — PAIN DESCRIPTION - ONSET
ONSET: ON-GOING
ONSET: ON-GOING

## 2024-03-04 ASSESSMENT — PAIN DESCRIPTION - PAIN TYPE
TYPE: ACUTE PAIN
TYPE: ACUTE PAIN

## 2024-03-04 ASSESSMENT — PAIN - FUNCTIONAL ASSESSMENT
PAIN_FUNCTIONAL_ASSESSMENT: PREVENTS OR INTERFERES SOME ACTIVE ACTIVITIES AND ADLS
PAIN_FUNCTIONAL_ASSESSMENT: ACTIVITIES ARE NOT PREVENTED
PAIN_FUNCTIONAL_ASSESSMENT: PREVENTS OR INTERFERES SOME ACTIVE ACTIVITIES AND ADLS

## 2024-03-04 ASSESSMENT — PAIN SCALES - GENERAL
PAINLEVEL_OUTOF10: 0
PAINLEVEL_OUTOF10: 3
PAINLEVEL_OUTOF10: 3

## 2024-03-04 ASSESSMENT — PAIN DESCRIPTION - DESCRIPTORS
DESCRIPTORS: SORE
DESCRIPTORS: ACHING;DISCOMFORT
DESCRIPTORS: ACHING;DISCOMFORT

## 2024-03-04 ASSESSMENT — PAIN DESCRIPTION - LOCATION
LOCATION: THROAT
LOCATION: FOOT
LOCATION: FOOT

## 2024-03-04 ASSESSMENT — PAIN DESCRIPTION - DIRECTION
RADIATING_TOWARDS: DENIES
RADIATING_TOWARDS: DENIES

## 2024-03-04 ASSESSMENT — PAIN DESCRIPTION - FREQUENCY
FREQUENCY: CONTINUOUS
FREQUENCY: INTERMITTENT

## 2024-03-04 NOTE — PLAN OF CARE
Problem: Safety - Adult  Goal: Free from fall injury  Outcome: Progressing  Note:  Remains free from falls, bed in low position, call light in reach, bed alarm on for safety, will continue to monitor.     Problem: Pain  Goal: Verbalizes/displays adequate comfort level or baseline comfort level  Outcome: Progressing  None:  PRN Cepacol throat lozenge helpful for c/o sore throat.

## 2024-03-04 NOTE — CARE COORDINATION
Case Management Assessment  Initial Evaluation    Date/Time of Evaluation: 3/4/2024 3:43 PM  Assessment Completed by: TRINA SELF    If patient is discharged prior to next notation, then this note serves as note for discharge by case management.    Patient Name: Tushar Block                   YOB: 1940  Diagnosis: Sepsis ([facility]) [A41.9]  Pneumonia due to infectious organism, unspecified laterality, unspecified part of lung [J18.9]  Urinary tract infection associated with indwelling urethral catheter, initial encounter ([facility]) [T83.511A, N39.0]  Nausea and vomiting, unspecified vomiting type [R11.2]                   Date / Time: 2/29/2024  9:50 AM    Patient Admission Status: Inpatient   Readmission Risk (Low < 19, Mod (19-27), High > 27): Readmission Risk Score: 22.1    Current PCP: Mario Alberto Tran  PCP verified by CM? Yes    Chart Reviewed: Yes      History Provided by: Patient, Medical Record  Patient Orientation: Alert and Oriented    Patient Cognition: Alert    Hospitalization in the last 30 days (Readmission):  No    If yes, Readmission Assessment in CM Navigator will be completed.    Advance Directives:      Code Status: Full Code   Patient's Primary Decision Maker is: Legal Next of Kin    Primary Decision Maker: Jourdan Block - Child - [phone]    Primary Decision Maker: Misti Block - Taylor - [phone]    Discharge Planning:    Patient lives with: Alone Type of Home: Assisted living  Primary Care Giver: Self  Patient Support Systems include: Family Members   Current Financial resources: Medicare  Current community resources: Assisted Living ([facility])  Current services prior to admission: Other (Comment) ([facility])            Current DME:              Type of Home Care services:  None    ADLS  Prior functional level: Assistance with the following:, Bathing, Dressing, Toileting, Cooking, Housework, Shopping, Mobility  Current functional level: Assistance with the

## 2024-03-04 NOTE — PROGRESS NOTES
Hospital Medicine Progress Note      Date of Admission: 2/29/2024  Hospital Day: 5    Chief Admission Complaint: Vomiting     Subjective: Patient was seen and evaluated at bedside.  Has not had vomiting.  Complains of worsening shortness of breath.  Did not have any events overnight    Presenting Admission History:       83 y.o. male with a pmh of essential hypertension, GERD, CAD, status post CABG, chronic indwelling Iglesias catheter, baclofen pump, MDRO, MRSA infection, hyperlipidemia, paroxysmal A-fib, not on chronic anticoagulation due to prior GI bleed and many other chronic medical conditions who presents from his SNF with complaints of vomiting for the past 2 days.  Patient was found to be febrile 102 °F, tachycardic to 120s with borderline blood pressure and required supplemental oxygen via nasal cannula-2 L/min  Patient has a cough, which she states difficult to bring up.    Assessment/Plan:      Current Principal Problem:  Sepsis (HCC)    Sepsis/urinary tract infection with indwelling Iglesias catheter/pneumonia/hypoxia/dysphagia  Extensive pressure wounds involving the back and legs  Dehydration/electrolyte abnormalities  Coronary disease status post CABG/paroxysmal A-fib not on anticoagulation  GERD/history of GI bleed/hypothyroidism  Goals of care?    Plan:  *Presented with concern for sepsis  Urinalysis concerning for UTI with indwelling chronic Iglesias catheter  Urine culture growing Proteus mirabilis  Requiring 2 L oxygen via nasal cannula, wean as tolerated  Chest x-ray concerning for pneumonia of the right lung  Speech therapy started the patient on a modified diet  Procalcitonin 0.76, CRP 99, MRSA negative  Negative strep, Legionella antigen,   Blood cultures NGTD  D3 IV vancomycin discontinued  IV cefepime day 3 discontinued  Azithromycin day 5/5  IV Rocephin day 2/4 to complete a total of 7 days of antibiotics  Incentive spirometry  Scheduled Mucinex    *Patient seems to have extensive pressure  wounds involving his back, and venous stasis ulcers of the legs  Wound care and podiatry on consult  No need for antibiotics per podiatry    *Presented with mild hyperkalemia and hyponatremia  Electrolyte imbalance improved since presentation with IV fluids  discontinue IV fluids    *Underlying history of CAD status post CABG, diastolic dysfunction, paroxysmal A-fib not on anticoagulation due to history of GI bleed  Is now wheezing with concerns for pulmonary edema  Start IV Lasix, daily weights, strict I's and O's  Scheduled DuoNebs while awake  Continue on PPI therapy for GERD  Continue home thyroid supplementation    *Patient is apparently active with hospice at nursing facility  On interview patient wants to be full code  Patient's POA is son Jourdan Whitehead  Discussed with [facility] with palliative care  Patient seems to enroll with hospice on discharge for benefits however is not fully hospice    Physical Exam Performed:      General: No distress, alert and oriented x3,  Cardiac: S1 plus S2 regular rate and rhythm, no murmurs rubs or gallops  Respiratory: Scattered wheeze appreciated, diminished breath sounds bilaterally,   GI/: Abdomen soft, nontender, bowel sounds audible, chronic Iglesias catheter  Skin: Multiple pressure wounds involving his back as well as bilateral lower extremities in various stages of healing      /73   Pulse 91   Temp 98.4 °F (36.9 °C) (Oral)   Resp 16   Ht 1.829 m (6')   Wt 93.3 kg (205 lb 11 oz)   SpO2 94%   BMI 27.90 kg/m²     Diet: ADULT ORAL NUTRITION SUPPLEMENT; Breakfast; Wound Healing Oral Supplement  ADULT DIET; Dysphagia - Soft and Bite Sized  DVT Prophylaxis: [x]PPx LMWH  []SQ Heparin  []IPC/SCDs  []Eliquis  []Xarelto  []Coumadin  []Other -      Code status: Full Code  PT/OT Eval Status:   []NOT yet ordered  []Ordered and Pending   [x]Seen with Recommendations for:  []Home independently  []Home w/ assist  []HHC  []SNF  []Acute Rehab long-term

## 2024-03-04 NOTE — PLAN OF CARE
Problem: Safety - Adult  Goal: Free from fall injury  3/4/2024 0728 by Susanna Recinos RN  Outcome: Progressing  Flowsheets (Taken 3/4/2024 0728)  Free From Fall Injury:   Instruct family/caregiver on patient safety   Based on caregiver fall risk screen, instruct family/caregiver to ask for assistance with transferring infant if caregiver noted to have fall risk factors  3/4/2024 0722 by Susanna Recinos RN  Outcome: Progressing  Flowsheets (Taken 3/4/2024 0722)  Free From Fall Injury:   Instruct family/caregiver on patient safety   Based on caregiver fall risk screen, instruct family/caregiver to ask for assistance with transferring infant if caregiver noted to have fall risk factors  3/4/2024 0338 by Gi Tesfaye RN  Outcome: Progressing     Problem: Pain  Goal: Verbalizes/displays adequate comfort level or baseline comfort level  3/4/2024 0728 by Susanna Recinos RN  Flowsheets (Taken 3/4/2024 0728)  Verbalizes/displays adequate comfort level or baseline comfort level:   Encourage patient to monitor pain and request assistance   Administer analgesics based on type and severity of pain and evaluate response   Consider cultural and social influences on pain and pain management   Assess pain using appropriate pain scale   Implement non-pharmacological measures as appropriate and evaluate response   Notify Licensed Independent Practitioner if interventions unsuccessful or patient reports new pain  3/4/2024 0722 by Susanna Recinos RN  Outcome: Progressing  Flowsheets (Taken 3/4/2024 0722)  Verbalizes/displays adequate comfort level or baseline comfort level:   Encourage patient to monitor pain and request assistance   Administer analgesics based on type and severity of pain and evaluate response   Consider cultural and social influences on pain and pain management   Assess pain using appropriate pain scale   Implement non-pharmacological measures as appropriate and evaluate response   Notify Licensed  Independent Practitioner if interventions unsuccessful or patient reports new pain  3/4/2024 0338 by Gi Tesfaye, RN  Outcome: Progressing

## 2024-03-04 NOTE — PLAN OF CARE
Problem: Safety - Adult  Goal: Free from fall injury  3/4/2024 0722 by Susanna Recinos RN  Outcome: Progressing  Flowsheets (Taken 3/4/2024 0722)  Free From Fall Injury:   Instruct family/caregiver on patient safety   Based on caregiver fall risk screen, instruct family/caregiver to ask for assistance with transferring infant if caregiver noted to have fall risk factors  3/4/2024 0338 by Gi Tesfaye RN  Outcome: Progressing     Problem: Pain  Goal: Verbalizes/displays adequate comfort level or baseline comfort level  3/4/2024 0722 by Susanna Recinos RN  Outcome: Progressing  Flowsheets (Taken 3/4/2024 0722)  Verbalizes/displays adequate comfort level or baseline comfort level:   Encourage patient to monitor pain and request assistance   Administer analgesics based on type and severity of pain and evaluate response   Consider cultural and social influences on pain and pain management   Assess pain using appropriate pain scale   Implement non-pharmacological measures as appropriate and evaluate response   Notify Licensed Independent Practitioner if interventions unsuccessful or patient reports new pain  3/4/2024 0338 by Gi Tesfaye RN  Outcome: Progressing

## 2024-03-04 NOTE — PROGRESS NOTES
Podiatric Surgery Daily Progress Note  Tushar Block      Subjective :   Patient seen and examined this am at the bedside. Patient with some confusion regarding current health status and mentioning issues with his spleen. He seems more agitated and confused this morning that during previous encounters. Patient denies any acute overnight events. Patient denies N/V/F/C/SOB. Patient denies calf pain, thigh pain, chest pain.     Review of Systems: A 12 point review of symptoms is unremarkable with the exception of the chief complaint. Patient specifically denies nausea, fever, vomiting, chills, shortness of breath, chest pain, abdominal pain, constipation or difficulty urinating.       Objective     BP (!) 145/75   Pulse 77   Temp 97.9 °F (36.6 °C) (Oral)   Resp 18   Ht 1.829 m (6')   Wt 93.5 kg (206 lb 2.1 oz)   SpO2 96%   BMI 27.96 kg/m²      I/O:  Intake/Output Summary (Last 24 hours) at 3/4/2024 0559  Last data filed at 3/4/2024 0040  Gross per 24 hour   Intake 1000 ml   Output 2350 ml   Net -1350 ml                Wt Readings from Last 3 Encounters:   03/03/24 93.5 kg (206 lb 2.1 oz)   01/06/24 96.6 kg (212 lb 15.4 oz)   05/25/23 90.7 kg (200 lb)       LABS:    Recent Labs     03/02/24  0546 03/03/24  0909   WBC 9.4 7.1   HGB 10.0* 8.9*   HCT 31.6* 28.0*    154          Recent Labs     03/03/24  0909      K 4.2      CO2 24   BUN 35*   CREATININE 0.9          No results for input(s): \"PROT\", \"INR\", \"APTT\" in the last 72 hours.          LOWER EXTREMITY EXAMINATION    Dressing to b/l LE intact. No strikethrough noted to the external dressing. Scant amount of serosanguinous drainage noted to the internal layers of the dressing.     VASCULAR: DP and PT pulses faintly palpable. Upon handheld doppler examination the DP and PT are noted to be biphasic bilaterally. CFT is brisk to the digits of the foot b/l. Skin temperature is warm to cool from proximal to distal with no focal calor noted.

## 2024-03-04 NOTE — PROGRESS NOTES
Speech pathology  Attempt    Attempted to see pt however pt now receiving nursing care. Will follow up as pt able and schedule allows      SALLIE SWAIN M.S./CCC-SLP #[id number]  Pg. # [phone]

## 2024-03-04 NOTE — PROGRESS NOTES
Occupational Therapy/Physical Therapy  Discharge    PT/OT orders received, chart reviewed. Per previous therapy notes dated 7/2022, pt is non-ambulatory at baseline, raymundo lift is used to transfer patient.  He is dependent with all ADLs in bed.  Pt is not appropriate for acute OT/PT evaluations or treatment.  Anticipate pt will return to prior setting with prior care at discharge.  Will sign off.  RN informed.    Kristin Flowers OTR/L #[id number]  Kathrine Sheikh PT, DPT [id number]

## 2024-03-05 ENCOUNTER — APPOINTMENT (OUTPATIENT)
Dept: CT IMAGING | Age: 84
DRG: 698 | End: 2024-03-05
Payer: MEDICARE

## 2024-03-05 ENCOUNTER — APPOINTMENT (OUTPATIENT)
Dept: GENERAL RADIOLOGY | Age: 84
DRG: 698 | End: 2024-03-05
Payer: MEDICARE

## 2024-03-05 PROBLEM — R55: Status: ACTIVE | Noted: 2024-03-05

## 2024-03-05 LAB
ALBUMIN SERPL-MCNC: 2.5 G/DL (ref 3.4–5)
ALBUMIN/GLOB SERPL: 0.6 {RATIO} (ref 1.1–2.2)
ALP SERPL-CCNC: 103 U/L (ref 40–129)
ALT SERPL-CCNC: 6 U/L (ref 10–40)
ANION GAP SERPL CALCULATED.3IONS-SCNC: 12 MMOL/L (ref 3–16)
AST SERPL-CCNC: 8 U/L (ref 15–37)
BASE EXCESS BLDA CALC-SCNC: 2 MMOL/L (ref -3–3)
BASOPHILS # BLD: 0 K/UL (ref 0–0.2)
BASOPHILS NFR BLD: 0.6 %
BILIRUB SERPL-MCNC: <0.2 MG/DL (ref 0–1)
BUN SERPL-MCNC: 26 MG/DL (ref 7–20)
CA-I BLD-SCNC: 1.22 MMOL/L (ref 1.12–1.32)
CALCIUM SERPL-MCNC: 7.7 MG/DL (ref 8.3–10.6)
CHLORIDE SERPL-SCNC: 107 MMOL/L (ref 99–110)
CO2 BLDA-SCNC: 30 MMOL/L
CO2 SERPL-SCNC: 23 MMOL/L (ref 21–32)
CREAT SERPL-MCNC: 0.9 MG/DL (ref 0.8–1.3)
DEPRECATED RDW RBC AUTO: 19.2 % (ref 12.4–15.4)
EOSINOPHIL # BLD: 0.3 K/UL (ref 0–0.6)
EOSINOPHIL NFR BLD: 3.8 %
GFR SERPLBLD CREATININE-BSD FMLA CKD-EPI: >60 ML/MIN/{1.73_M2}
GLUCOSE BLD-MCNC: 88 MG/DL (ref 70–99)
GLUCOSE BLD-MCNC: 94 MG/DL (ref 70–99)
GLUCOSE SERPL-MCNC: 95 MG/DL (ref 70–99)
HCO3 BLDA-SCNC: 28 MMOL/L (ref 21–29)
HCT VFR BLD AUTO: 27.5 % (ref 40.5–52.5)
HGB BLD-MCNC: 8.6 G/DL (ref 13.5–17.5)
LACTATE BLD-SCNC: 0.54 MMOL/L (ref 0.4–2)
LACTATE BLDV-SCNC: 1.1 MMOL/L (ref 0.4–2)
LYMPHOCYTES # BLD: 1.7 K/UL (ref 1–5.1)
LYMPHOCYTES NFR BLD: 21.3 %
MCH RBC QN AUTO: 28.7 PG (ref 26–34)
MCHC RBC AUTO-ENTMCNC: 31.4 G/DL (ref 31–36)
MCV RBC AUTO: 91.6 FL (ref 80–100)
MONOCYTES # BLD: 0.9 K/UL (ref 0–1.3)
MONOCYTES NFR BLD: 10.7 %
NEUTROPHILS # BLD: 5.1 K/UL (ref 1.7–7.7)
NEUTROPHILS NFR BLD: 63.6 %
PCO2 BLDA: 51.2 MM HG (ref 35–45)
PERFORMED ON: ABNORMAL
PERFORMED ON: NORMAL
PH BLDA: 7.35 [PH] (ref 7.35–7.45)
PLATELET # BLD AUTO: 165 K/UL (ref 135–450)
PMV BLD AUTO: 8.1 FL (ref 5–10.5)
PO2 BLDA: 173.2 MM HG (ref 75–108)
POC SAMPLE TYPE: ABNORMAL
POTASSIUM BLD-SCNC: 3.9 MMOL/L (ref 3.5–5.1)
POTASSIUM SERPL-SCNC: 4.2 MMOL/L (ref 3.5–5.1)
PROCALCITONIN SERPL IA-MCNC: 0.46 NG/ML (ref 0–0.15)
PROT SERPL-MCNC: 6.7 G/DL (ref 6.4–8.2)
RBC # BLD AUTO: 3.01 M/UL (ref 4.2–5.9)
SAO2 % BLDA: 100 % (ref 93–100)
SODIUM BLD-SCNC: 144 MMOL/L (ref 136–145)
SODIUM SERPL-SCNC: 142 MMOL/L (ref 136–145)
TSH SERPL DL<=0.005 MIU/L-ACNC: 0.93 UIU/ML (ref 0.27–4.2)
WBC # BLD AUTO: 8 K/UL (ref 4–11)

## 2024-03-05 PROCEDURE — 94640 AIRWAY INHALATION TREATMENT: CPT

## 2024-03-05 PROCEDURE — 6360000002 HC RX W HCPCS: Performed by: STUDENT IN AN ORGANIZED HEALTH CARE EDUCATION/TRAINING PROGRAM

## 2024-03-05 PROCEDURE — 94761 N-INVAS EAR/PLS OXIMETRY MLT: CPT

## 2024-03-05 PROCEDURE — 6360000002 HC RX W HCPCS: Performed by: INTERNAL MEDICINE

## 2024-03-05 PROCEDURE — 6360000002 HC RX W HCPCS

## 2024-03-05 PROCEDURE — 85025 COMPLETE CBC W/AUTO DIFF WBC: CPT

## 2024-03-05 PROCEDURE — 83605 ASSAY OF LACTIC ACID: CPT

## 2024-03-05 PROCEDURE — 1200000000 HC SEMI PRIVATE

## 2024-03-05 PROCEDURE — 80053 COMPREHEN METABOLIC PANEL: CPT

## 2024-03-05 PROCEDURE — 93005 ELECTROCARDIOGRAM TRACING: CPT

## 2024-03-05 PROCEDURE — 36415 COLL VENOUS BLD VENIPUNCTURE: CPT

## 2024-03-05 PROCEDURE — 82330 ASSAY OF CALCIUM: CPT

## 2024-03-05 PROCEDURE — 2580000003 HC RX 258: Performed by: STUDENT IN AN ORGANIZED HEALTH CARE EDUCATION/TRAINING PROGRAM

## 2024-03-05 PROCEDURE — 99222 1ST HOSP IP/OBS MODERATE 55: CPT | Performed by: NURSE PRACTITIONER

## 2024-03-05 PROCEDURE — 2700000000 HC OXYGEN THERAPY PER DAY

## 2024-03-05 PROCEDURE — 6370000000 HC RX 637 (ALT 250 FOR IP): Performed by: INTERNAL MEDICINE

## 2024-03-05 PROCEDURE — 82803 BLOOD GASES ANY COMBINATION: CPT

## 2024-03-05 PROCEDURE — 71045 X-RAY EXAM CHEST 1 VIEW: CPT

## 2024-03-05 PROCEDURE — 82947 ASSAY GLUCOSE BLOOD QUANT: CPT

## 2024-03-05 PROCEDURE — 84443 ASSAY THYROID STIM HORMONE: CPT

## 2024-03-05 PROCEDURE — 84295 ASSAY OF SERUM SODIUM: CPT

## 2024-03-05 PROCEDURE — 2060000000 HC ICU INTERMEDIATE R&B

## 2024-03-05 PROCEDURE — 6370000000 HC RX 637 (ALT 250 FOR IP): Performed by: STUDENT IN AN ORGANIZED HEALTH CARE EDUCATION/TRAINING PROGRAM

## 2024-03-05 PROCEDURE — 84145 PROCALCITONIN (PCT): CPT

## 2024-03-05 PROCEDURE — 70450 CT HEAD/BRAIN W/O DYE: CPT

## 2024-03-05 PROCEDURE — 84132 ASSAY OF SERUM POTASSIUM: CPT

## 2024-03-05 PROCEDURE — 36600 WITHDRAWAL OF ARTERIAL BLOOD: CPT

## 2024-03-05 PROCEDURE — 2580000003 HC RX 258

## 2024-03-05 PROCEDURE — 2580000003 HC RX 258: Performed by: INTERNAL MEDICINE

## 2024-03-05 RX ORDER — IPRATROPIUM BROMIDE AND ALBUTEROL SULFATE 2.5; .5 MG/3ML; MG/3ML
1 SOLUTION RESPIRATORY (INHALATION)
Status: DISCONTINUED | OUTPATIENT
Start: 2024-03-05 | End: 2024-03-05

## 2024-03-05 RX ORDER — FUROSEMIDE 10 MG/ML
40 INJECTION INTRAMUSCULAR; INTRAVENOUS ONCE
Status: COMPLETED | OUTPATIENT
Start: 2024-03-05 | End: 2024-03-05

## 2024-03-05 RX ORDER — IPRATROPIUM BROMIDE AND ALBUTEROL SULFATE 2.5; .5 MG/3ML; MG/3ML
1 SOLUTION RESPIRATORY (INHALATION) 2 TIMES DAILY
Status: DISCONTINUED | OUTPATIENT
Start: 2024-03-06 | End: 2024-03-08

## 2024-03-05 RX ADMIN — GUAIFENESIN 600 MG: 600 TABLET ORAL at 09:28

## 2024-03-05 RX ADMIN — GUAIFENESIN 600 MG: 600 TABLET ORAL at 21:25

## 2024-03-05 RX ADMIN — SODIUM CHLORIDE, PRESERVATIVE FREE 10 ML: 5 INJECTION INTRAVENOUS at 21:24

## 2024-03-05 RX ADMIN — LEVOTHYROXINE SODIUM 75 MCG: 0.07 TABLET ORAL at 06:22

## 2024-03-05 RX ADMIN — FUROSEMIDE 40 MG: 10 INJECTION, SOLUTION INTRAMUSCULAR; INTRAVENOUS at 11:54

## 2024-03-05 RX ADMIN — CEFTRIAXONE 1000 MG: 1 INJECTION, POWDER, FOR SOLUTION INTRAMUSCULAR; INTRAVENOUS at 09:43

## 2024-03-05 RX ADMIN — SODIUM CHLORIDE: 9 INJECTION, SOLUTION INTRAVENOUS at 11:53

## 2024-03-05 RX ADMIN — GABAPENTIN 600 MG: 600 TABLET, FILM COATED ORAL at 09:28

## 2024-03-05 RX ADMIN — TRAZODONE HYDROCHLORIDE 150 MG: 100 TABLET ORAL at 21:25

## 2024-03-05 RX ADMIN — FUROSEMIDE 20 MG: 10 INJECTION, SOLUTION INTRAMUSCULAR; INTRAVENOUS at 18:24

## 2024-03-05 RX ADMIN — IPRATROPIUM BROMIDE AND ALBUTEROL SULFATE 1 DOSE: 2.5; .5 SOLUTION RESPIRATORY (INHALATION) at 11:32

## 2024-03-05 RX ADMIN — SODIUM CHLORIDE: 9 INJECTION, SOLUTION INTRAVENOUS at 18:24

## 2024-03-05 RX ADMIN — ENOXAPARIN SODIUM 40 MG: 100 INJECTION SUBCUTANEOUS at 09:28

## 2024-03-05 RX ADMIN — SODIUM CHLORIDE, PRESERVATIVE FREE 10 ML: 5 INJECTION INTRAVENOUS at 09:28

## 2024-03-05 RX ADMIN — MEROPENEM 1000 MG: 1 INJECTION INTRAVENOUS at 11:54

## 2024-03-05 RX ADMIN — PANTOPRAZOLE SODIUM 40 MG: 40 TABLET, DELAYED RELEASE ORAL at 06:22

## 2024-03-05 RX ADMIN — MEROPENEM 1000 MG: 1 INJECTION INTRAVENOUS at 18:26

## 2024-03-05 RX ADMIN — IPRATROPIUM BROMIDE AND ALBUTEROL SULFATE 1 DOSE: 2.5; .5 SOLUTION RESPIRATORY (INHALATION) at 07:45

## 2024-03-05 RX ADMIN — SODIUM CHLORIDE: 9 INJECTION, SOLUTION INTRAVENOUS at 09:42

## 2024-03-05 RX ADMIN — FUROSEMIDE 20 MG: 10 INJECTION, SOLUTION INTRAMUSCULAR; INTRAVENOUS at 09:28

## 2024-03-05 ASSESSMENT — PAIN SCALES - WONG BAKER
WONGBAKER_NUMERICALRESPONSE: 0

## 2024-03-05 NOTE — CONSULTS
traZODone (DESYREL) 150 mg, Oral, NIGHTLY    vitamin D (CHOLECALCIFEROL) 2,000 Units, Oral, DAILY          Physical Exam   PHYSICAL EXAM:   BP (!) 175/75   Pulse 94   Temp 98.1 °F (36.7 °C) (Oral)   Resp 18   Ht 1.829 m (6')   Wt 94.7 kg (208 lb 12.4 oz)   SpO2 98%   BMI 28.32 kg/m²     General Sitting up in bed, easily conversant, well-nourished   Neurologic  Mental status:   Awake, alert, oriented   Speech clear  No aphasia noted     Cranial nerves:   Pupils equal   Gaze conjugate, EOM's intact  Face symmetric  Sensation intact  No dysarthria      Motor Exam:  BUE 4/5  BLE 2/5    Sensory: light touch intact and symmetric in all 4 extremities.  No sensory extinction  Tone: normal in all 4 extremities    Diagnostic Data Reviewed    IMAGES:  Images personally reviewed and agree w/ radiology interpretation of Head CT  CT HEAD WO CONTRAST   Final Result   1.  No acute intracranial hemorrhage or mass effect.   2.  Chronic small vessel ischemic disease.           LABS:  All results below personally reviewed. Pertinent positives & negatives are addressed in Impression & Recommendations below.     LABS   Metabolic Panel Recent Labs     03/03/24  0909 03/04/24  0824 03/05/24  1032    140 142   K 4.2 4.3 4.2    109 107   CO2 24 19* 23   BUN 35* 29* 26*   CREATININE 0.9 0.9 0.9   GLUCOSE 92 95 95   CALCIUM 8.1* 7.9* 7.7*   LABALBU  --   --  2.5*   ALKPHOS  --   --  103   ALT  --   --  6*   AST  --   --  8*      CBC / Coags Recent Labs     03/03/24  0909 03/04/24  0824 03/05/24  1032   WBC 7.1 8.4 8.0   RBC 3.17* 3.08* 3.01*   HGB 8.9* 8.8* 8.6*   HCT 28.0* 28.4* 27.5*    161 165      Other Lab Results   Component Value Date/Time    LABA1C 5.3 03/01/2024 12:15 PM    LDLCALC 48 06/05/2021 12:45 PM    TRIG 54 06/05/2021 12:45 PM    TSH 3.05 06/05/2021 12:45 PM    [id number] 453 01/18/2022 06:18 AM    FOLATE 8.65 01/18/2022 06:18 AM    COVID19 NOT DETECTED 02/29/2024 11:00 AM     Lab Results    Component Value Date/Time    LACTSEPSIS 1.7 02/29/2024 10:50 AM    LACTA 1.1 03/05/2024 10:32 AM          CURRENT SCHEDULED MEDICATIONS   Inpatient Medications     [COMPLETED] meropenem, 1,000 mg, IntraVENous, Once **FOLLOWED BY** meropenem, 1,000 mg, IntraVENous, Q8H    [START ON 3/6/2024] ipratropium 0.5 mg-albuterol 2.5 mg, 1 Dose, Inhalation, BID    furosemide, 20 mg, IntraVENous, BID    guaiFENesin, 600 mg, Oral, BID    traZODone, 150 mg, Oral, Nightly    [Held by provider] gabapentin, 600 mg, Oral, BID    levothyroxine, 75 mcg, Oral, Daily    pantoprazole, 40 mg, Oral, QAM AC    sodium chloride flush, 5-40 mL, IntraVENous, 2 times per day    enoxaparin, 40 mg, SubCUTAneous, Daily   Infusions    sodium chloride 25 mL/hr at 03/05/24 1153      Antibiotics   Recent Abx Admin                     meropenem (MERREM) 1,000 mg in sodium chloride 0.9 % 100 mL IVPB (mini-bag) (mg) 1,000 mg New Bag 03/05/24 1154    cefTRIAXone (ROCEPHIN) 1,000 mg in sodium chloride 0.9 % 50 mL IVPB (mini-bag) (mg) 1,000 mg New Bag 03/05/24 0943

## 2024-03-05 NOTE — PROGRESS NOTES
b/l.     DERMATOLOGIC:   Right lower extremity:    Diffuse dermatological changes consistent with venous insufficiency to the dorsum of the right foot.  Well adhered cicatrix appreciated along the lateral aspect of the right foot from previous fifth ray amputation.    Verbal consent obtained prior to photos take today:     Multiple superficial abrasions appreciated to the anterior lateral aspect of the right lower extremity.  Most of the abrasions are well adhered eschars with several having a granular base to them. There is chronic dermatological changes with hyperpigmentation to the periwounds.  There is no evidence of erythema, chlor or other acute signs of infection.  White crust 2/2 previous dressing noted along the anterior aspect of the legs.  No fluctuance, crepitus, malodor or purulence noted.  There is no tunneling, tracking or probe to bone appreciated.     Left lower extremity:    Diffuse dermatological changes consistent with venous insufficiency to the dorsum of the right foot.  Well adhered cicatrix appreciated along the lateral aspect of the left foot from previous fifth ray amputation.      Partial-thickness ulceration appreciated to the anterior left shin.  The wound base is noted to be dry eschar.  Periwound is unremarkable without erythema, color.  No evidence of fluctuance, crepitus malodor or purulence.  Wound does not tunnel, track or probe to bone.     MUSCULOSKELETAL: Muscle strength is 5/5 for all pedal groups tested. No pain with palpation of the foot or ankle b/l. Ankle joint ROM is decreased in dorsiflexion with the knee extended. 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 There is osteoporosis. There are areas of soft tissue irregularity overlying the lateral aspect of the mid to distal third fibula. There are dense vascular calcifications. No radiopaque foreign body or soft   tissue gas.    Ramandeep  3/5/2024, 7:15 AM     Patient was seen and evaluated at bedside.  Agree with residents assessment and treatment plan.  Fauzia Richardson DPM

## 2024-03-05 NOTE — PROGRESS NOTES
Pt has schneider's catheter, care done with soap and water and keep the area clean and dry. No leakage noted from site. Back and sacral wounds noted with scanty bleeding. Cleansed with NS gently and applied with triad and covered with gauze to prevent it from direct contact to the bed and underpad.

## 2024-03-05 NOTE — SIGNIFICANT EVENT
Name: Tushar Block            Admitted: 2/29/2024     Significant event:  decreased responsiveness/AMS     Pt w/ a hx of GERD, HTN, CAD, s/p CABG, chronic indwelling schneider, MDRO, MRSA, HLD, pAF, hx of GIB, extensive pressure wounds, currently admitted for sepsis 2/2 UTI 2/2 indwelling schneider. ICU team was called to bedside because of decreased responsiveness/AMS.    Per primary RN, the pt was last known \"normal\" approximately 1 hr ago (0925). However she states that when she did her exam this morning but \"he didn't say anything\" and that he appeared somnolent. He was reportedly responsive and opened his eyes to voice. Unclear when he was last able to converse.     Unable to obtain ROS d/t AMS.     /60   Pulse 66   Temp 98.2 °F (36.8 °C) (Oral)   Resp 18   Ht 1.829 m (6')   Wt 94.7 kg (208 lb 12.4 oz)   SpO2 98%   BMI 28.32 kg/m²   General appearance: obtunded   Lungs: no rales or rhonchi    Heart: no obvious murmur   Abdomen: no masses    Neurologic: no responsiveness to painful stimuli such as nailbed pressure, brow ridge pressure, trap squeeze, or sternal rub. No withdrawal from painful stimuli. No responsiveness to loud voice.     A/P   Decreased responsiveness  - will obtain ABG, CBC, CMP, lactate, procalcitonin, CXR, EKG   - CT head w/o contrast stat  - given new onset AMS in the setting of sepsis likely 2/2 UTI, will broaden coverage of abx to meropenem (UA grew proteus mirabilis, sensitive to all abx except nitro)     Orders: CBC, ABG, CMP, lactate, procal, CXR, EKG, CT head noncon    Code:Full Code  Disposition: TBD     Lorene Oswald MD PGY-1 IM   3/5/2024  10:45 AM

## 2024-03-05 NOTE — PROGRESS NOTES
Pt. Returned from CT after a Rapid Response was called d/t pt. Not being able to be awaken. Pt. Was very lethargic and would not open his eyes. After being sternum rubbed very firmly pt. Would not arouse. MD's arrived to bedside and ordered: lab work, EKG, chest x-ray, ABG, and attempted a neuro assessment. Pt.s pupil were react, his vital signs were stable, respiratory was intact, cardiac was stable, and pulse was +3 and bounding. Pt. Is currently still very lethargic and unable to be awaken. Residents are aware and pt. Will continue to be closely monitored.

## 2024-03-05 NOTE — PROGRESS NOTES
Pt. Alert and oriented. Pt. States \"I am in [facility] [place]. What happen?\" \"I have times when I am in so much pain that I pass out and go into a coma.\" Bedside RN notified MD of pt.s statement and Pt. Has been alert and oriented since.

## 2024-03-05 NOTE — PLAN OF CARE
BAYRON notified me that Jourdan Tidwellmena's phone number is correct. I called and spoke with Wilmer. Updated him on patient's clinical status as well as the events that transpired earlier today.    Reginaldo Pringle, DO  Internal Medicine, PGY-1  3/5/2024, 4:21 PM

## 2024-03-05 NOTE — CARE COORDINATION
DISCHARGE PLANNING:  This CM received a call from patiens son, Wilmer, asking for discharge plan.   Per chart review, MD has been trying to get in touch with him to discuss current medical changes.   I let Wilmer know that I would have the team call him.   His number is [phone].    Reginaldo Pringle DO was made aware.    CM team will continue to follow.  Ying Elam, RN Case Manager  [phone]

## 2024-03-05 NOTE — PROGRESS NOTES
Pt.s wounds cleansed and redressed. Pt. Alert and oriented with no complaints of pain at this time. Pt. Currently asking about meals. MD notified and awaiting response.

## 2024-03-05 NOTE — PROGRESS NOTES
Speech Therapy   attempt    Patient was attempted to be seen by Speech Therapy Department this date for dysphagia. Patient was unable to be seen due to being NPO following rapid response. Will attempt to see pt again tomorrow as able    Ayesha Bellamy MA, CCC-SLP [id number]  Speech-Language Pathologist  Pager  [phone]

## 2024-03-05 NOTE — PROGRESS NOTES
V2.0    [facility] Progress Note      Name:  Tushar Block /Age/Sex: 1940  (83 y.o. male)   MRN & CSN:  [id number] & [id number] Encounter Date/Time: 3/5/2024 11:16 AM EST   Location:  6322/6322-01 PCP: Mario Alberto Tran     Attending:Jonathon Chester*       Hospital Day: 6    Assessment and Recommendations     83-year-old male presenting from SNF initially with vomiting but admitted for possible sepsis secondary to UTI/pneumonia.  He has a history of GERD, CAD status post CABG, chronic indwelling Iglesias catheter, b baclofen pump, paroxysmal A-fib not on anticoagulation due to GI bleed, MDRO, chronic wounds.  Was initially started on broad-spectrum antibiotics and with negative blood cultures was slowly being tailored.  His urine is growing Proteus mirabilis.  Chest x-ray concerning for pneumonia of the right lung.  MRSA negative.  Podiatry and wound care consulted for stasis ulcers of legs status post IV fluids.  Also started on IV Lasix due to concerns for pulmonary edema and is net -3 L.    This morning, patient did have a rapid response called as he became unresponsive.  Workup was essentially negative.  Patient's mentation did improve back to his baseline.  Antibiotics were switched to Merrem    Sepsis possibly secondary to catheter associated UTI/possible pneumonia -improving   -Continue Merrem.  If patient's mentation does not deteriorate further, will switch to Rocephin.  Will treat for total of 7 days.    Hypoxia  -Wean oxygen as able    Extensive pressure wounds in the back and leg  -Wound care and podiatry.    Electrolyte abnormalities    Possible volume overload  -Currently on IV Lasix.  Repeat x-ray tomorrow.  Check renal function tomorrow.    CAD status post CABG    Downtrending hemoglobin level  -Hold Lovenox.  Trend.    Episode of unresponsiveness  -Continue to assess mental status.    History of GI bleed    Paroxysmal A-fib  -Anticoagulation contraindicated in the setting of previous  AM     Lab Results   Component Value Date/Time    BLOODCULT2 No Growth after 4 days of incubation. 02/29/2024 10:50 AM     Organism:   Lab Results   Component Value Date/Time    ORG Proteus mirabilis 02/29/2024 10:50 AM         Electronically signed by Jonathon Mendoza MD on 3/5/2024 at 11:16 AM

## 2024-03-05 NOTE — PROGRESS NOTES
Pt. Is alert and oriented. Pt. Has taken medication is answering questions appropriately. Pt. Remembers everything that happened this morning up until the rapid response. Pt. Currently has no complaints of pain and vital signs are stable. Pt. Is resting comfortably in bed and will continue to be closely monitored.

## 2024-03-05 NOTE — PLAN OF CARE
Problem: Discharge Planning  Goal: Discharge to home or other facility with appropriate resources  Outcome: Progressing     Problem: Skin/Tissue Integrity  Goal: Absence of new skin breakdown  Description: 1.  Monitor for areas of redness and/or skin breakdown  2.  Assess vascular access sites hourly  3.  Every 4-6 hours minimum:  Change oxygen saturation probe site  4.  Every 4-6 hours:  If on nasal continuous positive airway pressure, respiratory therapy assess nares and determine need for appliance change or resting period.  Outcome: Progressing  Note: At risk to develop new skin changes.  Keep skin clean and dry, to change diaper whenever needed.     Problem: Safety - Adult  Goal: Free from fall injury  Outcome: Progressing  Note: At risk for fall.  All light within reach.  Instructed to call whenever he need assistance.  Bed locked, lowered, and alarm- on.     Problem: Respiratory - Adult  Goal: Achieves optimal ventilation and oxygenation  Outcome: Progressing  Flowsheets (Taken 3/4/2024 2207)  Achieves optimal ventilation and oxygenation:   Assess for changes in respiratory status   Assess for changes in mentation and behavior   Position to facilitate oxygenation and minimize respiratory effort   Oxygen supplementation based on oxygen saturation or arterial blood gases

## 2024-03-05 NOTE — CARE COORDINATION
DISCHARGE PLANNING:    Chart reviewed.  Patient is from [facility].  Patient was active with [facility] [phone] but was d/c when admitted to hospital.      Currently, plan is TBD.  Patient had RR called today for unresponsiveness.  Son to talk with physician.  Will follow up tomorrow for reassessment of discharge plan.  Patient can go back to [facility] without precert.  Patient may need home O2 eval and HHC for wound care.    Patient admitted for Sepsis and on IV ABX.  Patient requiring 2L/ O2.  Baseline is RA.    Electronically signed by ANDREZ Elizabeth, RN Case Manager on 3/5/2024 at 4:12 PM

## 2024-03-05 NOTE — PROGRESS NOTES
Spoke with son and updated him about pt.s rapid response. Son stated \" When we go and visit him at the nursing home he is usually asleep and very tired.\" He stated once he wakes up he can has a conversation and is very oriented. Not being able to wake the pt. Is not usually his baseline.

## 2024-03-05 NOTE — RT PROTOCOL NOTE
RT Inhaler-Nebulizer Bronchodilator Protocol Note    There is a bronchodilator order in the chart from a provider indicating to follow the RT Bronchodilator Protocol and there is an “Initiate RT Inhaler-Nebulizer Bronchodilator Protocol” order as well (see protocol at bottom of note).    CXR Findings:  XR CHEST PORTABLE    Result Date: 3/5/2024  Improving pulmonary edema. Persistent left perihilar opacity      The findings from the last RT Protocol Assessment were as follows:   History Pulmonary Disease: Smoker 15 pack years or more  Respiratory Pattern: Regular pattern and RR 12-20 bpm  Breath Sounds: Slightly diminished and/or crackles  Cough: Strong, productive  Indication for Bronchodilator Therapy: None  Bronchodilator Assessment Score: 4    Aerosolized bronchodilator medication orders have been revised according to the RT Inhaler-Nebulizer Bronchodilator Protocol below.    Respiratory Therapist to perform RT Therapy Protocol Assessment initially then follow the protocol.  Repeat RT Therapy Protocol Assessment PRN for score 0-3 or on second treatment, BID, and PRN for scores above 3.    No Indications - adjust the frequency to every 6 hours PRN wheezing or bronchospasm, if no treatments needed after 48 hours then discontinue using Per Protocol order mode.     If indication present, adjust the RT bronchodilator orders based on the Bronchodilator Assessment Score as indicated below.  Use Inhaler orders unless patient has one or more of the following: on home nebulizer, not able to hold breath for 10 seconds, is not alert and oriented, cannot activate and use MDI correctly, or respiratory rate 25 breaths per minute or more, then use the equivalent nebulizer order(s) with same Frequency and PRN reasons based on the score.  If a patient is on this medication at home then do not decrease Frequency below that used at home.    0-3 - enter or revise RT bronchodilator order(s) to equivalent RT Bronchodilator order with  Frequency of every 4 hours PRN for wheezing or increased work of breathing using Per Protocol order mode.        4-6 - enter or revise RT Bronchodilator order(s) to two equivalent RT bronchodilator orders with one order with BID Frequency and one order with Frequency of every 4 hours PRN wheezing or increased work of breathing using Per Protocol order mode.        7-10 - enter or revise RT Bronchodilator order(s) to two equivalent RT bronchodilator orders with one order with TID Frequency and one order with Frequency of every 4 hours PRN wheezing or increased work of breathing using Per Protocol order mode.       11-13 - enter or revise RT Bronchodilator order(s) to one equivalent RT bronchodilator order with QID Frequency and an Albuterol order with Frequency of every 4 hours PRN wheezing or increased work of breathing using Per Protocol order mode.      Greater than 13 - enter or revise RT Bronchodilator order(s) to one equivalent RT bronchodilator order with every 4 hours Frequency and an Albuterol order with Frequency of every 2 hours PRN wheezing or increased work of breathing using Per Protocol order mode.     RT to enter RT Home Evaluation for COPD & MDI Assessment order using Per Protocol order mode.    Electronically signed by Teresa Kahn RCP on 3/5/2024 at 4:51 PM

## 2024-03-05 NOTE — PLAN OF CARE
Responding to rapid response for patient demonstrating new unresponsiveness.    Attempted contacting children, Jourdan and Misti Block, via phone numbers in EMR. Jourdan Block's phone number had a voicemail message that was of another person so no voicemail was left. Misti Block's two phone numbers in chart were nonfunctioning so voicemail could not be left.    Will continue attempting to call Jourdan Block's phone number in the event someone answers.    Reginaldo Pringle, DO  Internal Medicine, PGY-1  3/5/2024, 10:56 AM

## 2024-03-06 LAB
ALBUMIN SERPL-MCNC: 2.8 G/DL (ref 3.4–5)
ANION GAP SERPL CALCULATED.3IONS-SCNC: 10 MMOL/L (ref 3–16)
ANION GAP SERPL CALCULATED.3IONS-SCNC: 8 MMOL/L (ref 3–16)
BASOPHILS # BLD: 0 K/UL (ref 0–0.2)
BASOPHILS # BLD: 0 K/UL (ref 0–0.2)
BASOPHILS NFR BLD: 0.4 %
BASOPHILS NFR BLD: 0.5 %
BUN SERPL-MCNC: 21 MG/DL (ref 7–20)
BUN SERPL-MCNC: 22 MG/DL (ref 7–20)
CALCIUM SERPL-MCNC: 8 MG/DL (ref 8.3–10.6)
CALCIUM SERPL-MCNC: 8.1 MG/DL (ref 8.3–10.6)
CHLORIDE SERPL-SCNC: 103 MMOL/L (ref 99–110)
CHLORIDE SERPL-SCNC: 105 MMOL/L (ref 99–110)
CO2 SERPL-SCNC: 27 MMOL/L (ref 21–32)
CO2 SERPL-SCNC: 28 MMOL/L (ref 21–32)
CREAT SERPL-MCNC: 0.7 MG/DL (ref 0.8–1.3)
CREAT SERPL-MCNC: 0.7 MG/DL (ref 0.8–1.3)
DEPRECATED RDW RBC AUTO: 18.1 % (ref 12.4–15.4)
DEPRECATED RDW RBC AUTO: 18.1 % (ref 12.4–15.4)
EKG ATRIAL RATE: 66 BPM
EKG DIAGNOSIS: NORMAL
EKG P AXIS: 61 DEGREES
EKG P-R INTERVAL: 240 MS
EKG Q-T INTERVAL: 410 MS
EKG QRS DURATION: 80 MS
EKG QTC CALCULATION (BAZETT): 429 MS
EKG R AXIS: 98 DEGREES
EKG T AXIS: 48 DEGREES
EKG VENTRICULAR RATE: 66 BPM
EOSINOPHIL # BLD: 0.5 K/UL (ref 0–0.6)
EOSINOPHIL # BLD: 0.6 K/UL (ref 0–0.6)
EOSINOPHIL NFR BLD: 6 %
EOSINOPHIL NFR BLD: 6.6 %
GFR SERPLBLD CREATININE-BSD FMLA CKD-EPI: >60 ML/MIN/{1.73_M2}
GFR SERPLBLD CREATININE-BSD FMLA CKD-EPI: >60 ML/MIN/{1.73_M2}
GLUCOSE SERPL-MCNC: 86 MG/DL (ref 70–99)
GLUCOSE SERPL-MCNC: 94 MG/DL (ref 70–99)
HCT VFR BLD AUTO: 29.4 % (ref 40.5–52.5)
HCT VFR BLD AUTO: 30.2 % (ref 40.5–52.5)
HGB BLD-MCNC: 9.6 G/DL (ref 13.5–17.5)
HGB BLD-MCNC: 9.8 G/DL (ref 13.5–17.5)
LYMPHOCYTES # BLD: 1.5 K/UL (ref 1–5.1)
LYMPHOCYTES # BLD: 1.7 K/UL (ref 1–5.1)
LYMPHOCYTES NFR BLD: 17.2 %
LYMPHOCYTES NFR BLD: 19 %
MCH RBC QN AUTO: 28.3 PG (ref 26–34)
MCH RBC QN AUTO: 28.8 PG (ref 26–34)
MCHC RBC AUTO-ENTMCNC: 32.4 G/DL (ref 31–36)
MCHC RBC AUTO-ENTMCNC: 32.6 G/DL (ref 31–36)
MCV RBC AUTO: 87.3 FL (ref 80–100)
MCV RBC AUTO: 88.3 FL (ref 80–100)
MONOCYTES # BLD: 0.7 K/UL (ref 0–1.3)
MONOCYTES # BLD: 0.8 K/UL (ref 0–1.3)
MONOCYTES NFR BLD: 8.2 %
MONOCYTES NFR BLD: 9.2 %
NEUTROPHILS # BLD: 5.8 K/UL (ref 1.7–7.7)
NEUTROPHILS # BLD: 6 K/UL (ref 1.7–7.7)
NEUTROPHILS NFR BLD: 65.7 %
NEUTROPHILS NFR BLD: 67.2 %
PHOSPHATE SERPL-MCNC: 2.5 MG/DL (ref 2.5–4.9)
PLATELET # BLD AUTO: 199 K/UL (ref 135–450)
PLATELET # BLD AUTO: 209 K/UL (ref 135–450)
PMV BLD AUTO: 8.1 FL (ref 5–10.5)
PMV BLD AUTO: 8.2 FL (ref 5–10.5)
POTASSIUM SERPL-SCNC: 3.8 MMOL/L (ref 3.5–5.1)
POTASSIUM SERPL-SCNC: 4 MMOL/L (ref 3.5–5.1)
RBC # BLD AUTO: 3.33 M/UL (ref 4.2–5.9)
RBC # BLD AUTO: 3.46 M/UL (ref 4.2–5.9)
SODIUM SERPL-SCNC: 140 MMOL/L (ref 136–145)
SODIUM SERPL-SCNC: 141 MMOL/L (ref 136–145)
WBC # BLD AUTO: 8.7 K/UL (ref 4–11)
WBC # BLD AUTO: 9.1 K/UL (ref 4–11)

## 2024-03-06 PROCEDURE — 80069 RENAL FUNCTION PANEL: CPT

## 2024-03-06 PROCEDURE — 1200000000 HC SEMI PRIVATE

## 2024-03-06 PROCEDURE — 93010 ELECTROCARDIOGRAM REPORT: CPT | Performed by: INTERNAL MEDICINE

## 2024-03-06 PROCEDURE — 2580000003 HC RX 258: Performed by: INTERNAL MEDICINE

## 2024-03-06 PROCEDURE — 2580000003 HC RX 258

## 2024-03-06 PROCEDURE — 36415 COLL VENOUS BLD VENIPUNCTURE: CPT

## 2024-03-06 PROCEDURE — 94761 N-INVAS EAR/PLS OXIMETRY MLT: CPT

## 2024-03-06 PROCEDURE — 95819 EEG AWAKE AND ASLEEP: CPT

## 2024-03-06 PROCEDURE — 85025 COMPLETE CBC W/AUTO DIFF WBC: CPT

## 2024-03-06 PROCEDURE — 6360000002 HC RX W HCPCS

## 2024-03-06 PROCEDURE — 99232 SBSQ HOSP IP/OBS MODERATE 35: CPT | Performed by: PSYCHIATRY & NEUROLOGY

## 2024-03-06 PROCEDURE — 6370000000 HC RX 637 (ALT 250 FOR IP): Performed by: INTERNAL MEDICINE

## 2024-03-06 PROCEDURE — 6370000000 HC RX 637 (ALT 250 FOR IP): Performed by: STUDENT IN AN ORGANIZED HEALTH CARE EDUCATION/TRAINING PROGRAM

## 2024-03-06 PROCEDURE — 92526 ORAL FUNCTION THERAPY: CPT

## 2024-03-06 PROCEDURE — 6360000002 HC RX W HCPCS: Performed by: STUDENT IN AN ORGANIZED HEALTH CARE EDUCATION/TRAINING PROGRAM

## 2024-03-06 PROCEDURE — 6370000000 HC RX 637 (ALT 250 FOR IP): Performed by: FAMILY MEDICINE

## 2024-03-06 PROCEDURE — 94640 AIRWAY INHALATION TREATMENT: CPT

## 2024-03-06 RX ADMIN — PANTOPRAZOLE SODIUM 40 MG: 40 TABLET, DELAYED RELEASE ORAL at 05:16

## 2024-03-06 RX ADMIN — SODIUM CHLORIDE, PRESERVATIVE FREE 10 ML: 5 INJECTION INTRAVENOUS at 08:45

## 2024-03-06 RX ADMIN — ACETAMINOPHEN 650 MG: 325 TABLET ORAL at 19:10

## 2024-03-06 RX ADMIN — IPRATROPIUM BROMIDE AND ALBUTEROL SULFATE 1 DOSE: 2.5; .5 SOLUTION RESPIRATORY (INHALATION) at 10:48

## 2024-03-06 RX ADMIN — MEROPENEM 1000 MG: 1 INJECTION INTRAVENOUS at 02:39

## 2024-03-06 RX ADMIN — LEVOTHYROXINE SODIUM 75 MCG: 0.07 TABLET ORAL at 05:16

## 2024-03-06 RX ADMIN — BENZONATATE 100 MG: 100 CAPSULE ORAL at 05:16

## 2024-03-06 RX ADMIN — MEROPENEM 1000 MG: 1 INJECTION INTRAVENOUS at 19:14

## 2024-03-06 RX ADMIN — FUROSEMIDE 20 MG: 10 INJECTION, SOLUTION INTRAMUSCULAR; INTRAVENOUS at 17:53

## 2024-03-06 RX ADMIN — MEROPENEM 1000 MG: 1 INJECTION INTRAVENOUS at 10:40

## 2024-03-06 RX ADMIN — TRAZODONE HYDROCHLORIDE 150 MG: 100 TABLET ORAL at 21:24

## 2024-03-06 RX ADMIN — IPRATROPIUM BROMIDE AND ALBUTEROL SULFATE 1 DOSE: 2.5; .5 SOLUTION RESPIRATORY (INHALATION) at 20:25

## 2024-03-06 RX ADMIN — GUAIFENESIN 600 MG: 600 TABLET ORAL at 21:24

## 2024-03-06 RX ADMIN — SODIUM CHLORIDE: 9 INJECTION, SOLUTION INTRAVENOUS at 02:37

## 2024-03-06 RX ADMIN — FUROSEMIDE 20 MG: 10 INJECTION, SOLUTION INTRAMUSCULAR; INTRAVENOUS at 08:45

## 2024-03-06 ASSESSMENT — PAIN SCALES - GENERAL
PAINLEVEL_OUTOF10: 0
PAINLEVEL_OUTOF10: 0
PAINLEVEL_OUTOF10: 3

## 2024-03-06 ASSESSMENT — PAIN SCALES - WONG BAKER
WONGBAKER_NUMERICALRESPONSE: 0
WONGBAKER_NUMERICALRESPONSE: 0

## 2024-03-06 ASSESSMENT — PAIN DESCRIPTION - LOCATION: LOCATION: FOOT

## 2024-03-06 ASSESSMENT — PAIN - FUNCTIONAL ASSESSMENT: PAIN_FUNCTIONAL_ASSESSMENT: ACTIVITIES ARE NOT PREVENTED

## 2024-03-06 ASSESSMENT — PAIN DESCRIPTION - ORIENTATION: ORIENTATION: RIGHT;LEFT

## 2024-03-06 NOTE — RT PROTOCOL NOTE
RT Inhaler-Nebulizer Bronchodilator Protocol Note    There is a bronchodilator order in the chart from a provider indicating to follow the RT Bronchodilator Protocol and there is an “Initiate RT Inhaler-Nebulizer Bronchodilator Protocol” order as well (see protocol at bottom of note).    CXR Findings:  XR CHEST PORTABLE    Result Date: 3/5/2024  Improving pulmonary edema. Persistent left perihilar opacity      The findings from the last RT Protocol Assessment were as follows:   History Pulmonary Disease: None or smoker <15 pack years  Respiratory Pattern: Regular pattern and RR 12-20 bpm  Breath Sounds: Slightly diminished and/or crackles  Cough: Weak, non-productive  Indication for Bronchodilator Therapy: None  Bronchodilator Assessment Score: 5  Will continue with BID.    Aerosolized bronchodilator medication orders have been revised according to the RT Inhaler-Nebulizer Bronchodilator Protocol below.    Respiratory Therapist to perform RT Therapy Protocol Assessment initially then follow the protocol.  Repeat RT Therapy Protocol Assessment PRN for score 0-3 or on second treatment, BID, and PRN for scores above 3.    No Indications - adjust the frequency to every 6 hours PRN wheezing or bronchospasm, if no treatments needed after 48 hours then discontinue using Per Protocol order mode.     If indication present, adjust the RT bronchodilator orders based on the Bronchodilator Assessment Score as indicated below.  Use Inhaler orders unless patient has one or more of the following: on home nebulizer, not able to hold breath for 10 seconds, is not alert and oriented, cannot activate and use MDI correctly, or respiratory rate 25 breaths per minute or more, then use the equivalent nebulizer order(s) with same Frequency and PRN reasons based on the score.  If a patient is on this medication at home then do not decrease Frequency below that used at home.    0-3 - enter or revise RT bronchodilator order(s) to equivalent RT

## 2024-03-06 NOTE — PROGRESS NOTES
Palliative Medicine Progress Note    Admit Date: 2/29/2024  Hospital Day:  Hospital Day: 7     CC:  vomiting   HPI: Tushar Block is a 83 y.o. male with PMH of  essential hypertension, GERD, CAD, status post CABG, chronic indwelling Iglesias catheter, MDRO, MRSA infection, hyperlipidemia, paroxysmal A-fib, not on chronic anticoagulation due to prior GI bleed and many other chronic medical conditions  who presented with vomiting for two days. Additionally pt has been febrile and tachycardic to the 120s. He was admitted for sepsis likely 2/2 complicated UTI.      Pt had a rapid response 3/5 for a transient episode of unresponsiveness. Work up was essentially negative. He has improved back to his baseline.     Met with pt at the bedside. He is alert, oriented to self, time and place. He denies any complaints and feels that he is improving. He is aware that he had been enrolled with hospice, however was not totally clear on the definition of hospice. I did explain that people who qualify for hospice have a less than 6 month prognosis, however he may live much longer than that on hospice. He expressed understanding. He does still want to come back to the hospital for treatment in the future if needed. He also wants to remain a full code. We discussed resuscitation in depth. He stated that he would discuss with his son Wilmer.     Called pt's son Wilmer. He reports that pt's memory and cognition have been declining. He has had discussions with pt regarding hospice and code status, and had though pt wanted to be a DNR although he reports pt has been very forgetful. He does want pt re-enrolled with hospice at discharge. Pt and Wilmer both report Wilmer is his HCPOA. Called CM Ying and updated her.     Recommendations:     1. Goals of Care/Advanced Care planning/Code status: Full code, continue with current management. Discussed again today. Pt's son Wilmer plans to discuss code status further.   2. Pain: pt denied  3. SOB: pt denied  4.

## 2024-03-06 NOTE — PROGRESS NOTES
Pt A&Ox 4 on 2L NC. AM assessment and vitals completed and put into flowsheets. AM medications given with no s/s of aspiration. Pt with no questions or concerns voiced to RN at this time. Fall precautions in place and call light within reach.

## 2024-03-06 NOTE — PROGRESS NOTES
slowed but adequate mastication, positive swallow movement, good oral clearance, no overt s/s aspiration. Recommend resume Soft & Bite-Sized diet + thin liquids.  Cont goal  Goal 2: Patient/caregiver will demonstrate understanding of swallowing concerns/recommendations.  3/6: Reviewed purpose of visit, swallow function, recommendations. Pt verbalized comprehension.  Cont goal    Education  See above    Pt goal: did not state  Pt discharge goal: did not state    Total treatment time: 15 minutes dysphagia tx    Plan:  Continue per POC  Recommended diet: soft & bite-sized solid / thin liquids / meds PO  - upright position  - encourage oral hygiene 2 x daily    Discharge Recommendation: further speech therapy not anticipated  Discussed with Yulissa WERNER  Needs met prior to leaving room, call light within reach    Sierra Marti, SLP, [phone]  Pg. # [phone]    This document will serve as a discharge summary if patient discharges prior to next visit.

## 2024-03-06 NOTE — CARE COORDINATION
DISCHARGE PLANNING:  Chart reviewed.  Patient is from [facility] with [facility]. Currently patient has been discharged from hospice services but they plan to meet with patient to discuss readmission once he returns to the [facility].   Patient will need transport once medically ready for discharge.    Currently on 2L oxygen (none at baseline). Will need a home O2 evaluation and set up if unable to wean.    CM team needs to inform Hospice ([phone]) and senior living ([phone]) when he is discharged.    CM team will continue to follow.  Ying Elam, RN Case Manager  [phone]

## 2024-03-06 NOTE — PROGRESS NOTES
Podiatric Surgery Daily Progress Note  Tushar Block      Subjective :   Patient seen and examined this am at the bedside. Rapid response called yesterday, patient alert and orient this morning.  Patient denies any acute overnight events. Patient denies N/V/F/C/SOB. Patient denies calf pain, thigh pain, chest pain.     Review of Systems: A 12 point review of symptoms is unremarkable with the exception of the chief complaint. Patient specifically denies nausea, fever, vomiting, chills, shortness of breath, chest pain, abdominal pain, constipation or difficulty urinating.       Objective     BP (!) 145/59   Pulse 78   Temp 98.1 °F (36.7 °C) (Oral)   Resp 18   Ht 1.829 m (6')   Wt 94.7 kg (208 lb 12.4 oz)   SpO2 100%   BMI 28.32 kg/m²      I/O:  Intake/Output Summary (Last 24 hours) at 3/6/2024 0547  Last data filed at 3/6/2024 0303  Gross per 24 hour   Intake --   Output 3825 ml   Net -3825 ml                Wt Readings from Last 3 Encounters:   03/05/24 94.7 kg (208 lb 12.4 oz)   01/06/24 96.6 kg (212 lb 15.4 oz)   05/25/23 90.7 kg (200 lb)       LABS:    Recent Labs     03/04/24  0824 03/05/24  1032   WBC 8.4 8.0   HGB 8.8* 8.6*   HCT 28.4* 27.5*    165          Recent Labs     03/05/24  1032      K 4.2      CO2 23   BUN 26*   CREATININE 0.9          Recent Labs     03/05/24  1032   PROT 6.7             LOWER EXTREMITY EXAMINATION    Dressing to b/l LE intact. No strikethrough noted to the external dressing. Scant amount of serosanguinous drainage noted to the internal layers of the dressing.     VASCULAR: DP and PT pulses faintly palpable. Upon handheld doppler examination the DP and PT are noted to be biphasic bilaterally. CFT is brisk to the digits of the foot b/l. Skin temperature is warm to cool from proximal to distal with no focal calor noted. Moderate 1+ pitting edema noted. No pain with calf compression b/l.     NEUROLOGIC: Gross and epicritic sensation is diminished b/l.

## 2024-03-06 NOTE — PROGRESS NOTES
Wound dressing 2x a day done at his back and buttoks. Removed the previously applied mepilex  and cleansed with saline and applied with triad cream then applied new sets of mepilex in open wound and area with scant bleed. Wound has no significant change from yesterday. Maintain with specialty bed, repositioned 2 hourly and kept th skin clean and dry by changing diaper as needed.

## 2024-03-06 NOTE — PLAN OF CARE
Problem: Discharge Planning  Goal: Discharge to home or other facility with appropriate resources  3/6/2024 1022 by Yulissa Polo RN  Outcome: Progressing  3/6/2024 0118 by Caitlin Hussein RN  Outcome: Progressing     Problem: Skin/Tissue Integrity  Goal: Absence of new skin breakdown  Description: 1.  Monitor for areas of redness and/or skin breakdown  2.  Assess vascular access sites hourly  3.  Every 4-6 hours minimum:  Change oxygen saturation probe site  4.  Every 4-6 hours:  If on nasal continuous positive airway pressure, respiratory therapy assess nares and determine need for appliance change or resting period.  3/6/2024 1022 by Yulissa Polo RN  Outcome: Progressing  3/6/2024 0118 by Caitlin Hussein RN  Outcome: Progressing  Note: He already have bedsores, and risk of developing new one.  Repositioned 2 hourly, maintain with specialty mattress.  Keep skin clean and dry.  To encourage him to eat once diet resumes.       Problem: Safety - Adult  Goal: Free from fall injury  3/6/2024 1022 by Yulissa Polo RN  Outcome: Progressing  3/6/2024 0118 by Caitlin Hussein RN  Outcome: Progressing  Note: Maintain all the safety measures provided.     Problem: Respiratory - Adult  Goal: Achieves optimal ventilation and oxygenation  3/6/2024 1022 by Yulissa Polo RN  Outcome: Progressing  3/6/2024 0118 by Caitlin Hussein RN  Outcome: Progressing  Flowsheets (Taken 3/6/2024 0118)  Achieves optimal ventilation and oxygenation:   Assess for changes in respiratory status   Assess for changes in mentation and behavior   Position to facilitate oxygenation and minimize respiratory effort   Oxygen supplementation based on oxygen saturation or arterial blood gases     Problem: Skin/Tissue Integrity - Adult  Goal: Skin integrity remains intact  Outcome: Progressing     Problem: Infection - Adult  Goal: Absence of infection at discharge  3/6/2024 1022 by Yulissa Polo RN  Outcome: Progressing  3/6/2024 0118 by

## 2024-03-06 NOTE — PROGRESS NOTES
Physician Progress Note      PATIENT:               JEFFERSON CLAYTON  CSN #:                  [id number]  :                       1940  ADMIT DATE:       2024 9:50 AM  DISCH DATE:  RESPONDING  PROVIDER #:        Jonathon Mendoza MD          QUERY TEXT:    Pt admitted with sepsis, UTI.  Pt noted to have chronic indwelling urinary   catheter. If possible, please document in the progress notes and discharge   summary if you are evaluating and/or treating any of the following:    The medical record reflects the following:  Risk Factors: 82 yo w/ chronic schneider  Clinical Indicators: Per PN 3/3: Urinalysis concerning for UTI with indwelling   chronic Schneider catheter  Urine culture growing Proteus mirabilis.  Treatment: Replace schneider, UA, urine cx, IV Zithromax, Rocephin  Options provided:  -- UTI due to chronic indwelling urinary catheter  -- UTI due to previous urinary catheter ***(date)  -- UTI not due to indwelling urinary catheter  -- Other - I will add my own diagnosis  -- Disagree - Not applicable / Not valid  -- Disagree - Clinically unable to determine / Unknown  -- Refer to Clinical Documentation Reviewer    PROVIDER RESPONSE TEXT:    UTI is due to the chronic indwelling urinary catheter.    Query created by: Dorinda Michael on 3/4/2024 6:45 AM      Electronically signed by:  Jonathon Mendoza MD 3/5/2024 9:01 PM

## 2024-03-06 NOTE — PROGRESS NOTES
Neurology Progress Note    Patient: Tushar Block MRN: [id number]    YOB: 1940  Age: 83 y.o.  Sex: male   Unit: [address] Room/Bed: 6322/6322-01 Location: [facility]    Today's Date: 3/6/2024  Date of Admission: 2/29/2024  9:50 AM  Admitting Physician: SURI HOLLOWAY    Primary Care Physician: Mario Alberto Tran          LOS: 6 days      ASSESSMENT & RECOMMENDATIONS     Assessment  84yo man admitted 6 days ago with sepsis secondary to complicated UTI who had rapid response called yesterday due to episode of unresponsiveness but with subsequent complete return to baseline  Seems unlikely, but cannot exclude seizure  Given his age, sepsis, his number of days of admission in his particular room of very small size and minimal window, delirium is always a high consideration  Would not necessarily expect it to onset so abruptly and resolve so quickly to complete baseline, however    Recommendations  Routine EEG (pending)  No AED unless EEG is abnormal or pt has further episodes  If EEG unremarkable then no further in-patient neuro workup indicated and will sign off at that time      SUBJECTIVE     Meeting patient for the first time. Initial consultation note was completed by Neurology CNP yesterday (3/5/24) evening, which I have reviewed.    84yo man with BPH; afib; HTN; prior GIB; spasticity; systolic heart failure; Vitamin B12 deficiency. Admitted 6 days ago due to sepsis secondary to complicated UTI. Rapid response was called yesterday due to abrupt change in mentation, which resolved. He was described as being unresponsive to stimuli. He does not recall the events. Vital signs were unremarkable with BP of 131/60.     No acute events overnight. Afebrile. Today pt feels well. He feels that he is improving overall from his infection. He does not recall the events of yesterday.     he is unsure what would have precipitated these symptoms, other than the above. he feels

## 2024-03-07 ENCOUNTER — APPOINTMENT (OUTPATIENT)
Dept: GENERAL RADIOLOGY | Age: 84
DRG: 698 | End: 2024-03-07
Payer: MEDICARE

## 2024-03-07 ENCOUNTER — ANCILLARY PROCEDURE (OUTPATIENT)
Dept: EMERGENCY DEPT | Age: 84
DRG: 698 | End: 2024-03-07
Attending: EMERGENCY MEDICINE
Payer: MEDICARE

## 2024-03-07 LAB
ALBUMIN SERPL-MCNC: 2.7 G/DL (ref 3.4–5)
ANION GAP SERPL CALCULATED.3IONS-SCNC: 1 MMOL/L (ref 3–16)
ANION GAP SERPL CALCULATED.3IONS-SCNC: 9 MMOL/L (ref 3–16)
BASOPHILS # BLD: 0.1 K/UL (ref 0–0.2)
BASOPHILS NFR BLD: 0.8 %
BUN SERPL-MCNC: 16 MG/DL (ref 7–20)
BUN SERPL-MCNC: 18 MG/DL (ref 7–20)
CALCIUM SERPL-MCNC: 8.1 MG/DL (ref 8.3–10.6)
CALCIUM SERPL-MCNC: 8.2 MG/DL (ref 8.3–10.6)
CHLORIDE SERPL-SCNC: 100 MMOL/L (ref 99–110)
CHLORIDE SERPL-SCNC: 101 MMOL/L (ref 99–110)
CO2 SERPL-SCNC: 27 MMOL/L (ref 21–32)
CO2 SERPL-SCNC: 35 MMOL/L (ref 21–32)
CREAT SERPL-MCNC: 0.6 MG/DL (ref 0.8–1.3)
CREAT SERPL-MCNC: 0.7 MG/DL (ref 0.8–1.3)
DEPRECATED RDW RBC AUTO: 18.4 % (ref 12.4–15.4)
EOSINOPHIL # BLD: 0.5 K/UL (ref 0–0.6)
EOSINOPHIL NFR BLD: 6 %
GFR SERPLBLD CREATININE-BSD FMLA CKD-EPI: >60 ML/MIN/{1.73_M2}
GFR SERPLBLD CREATININE-BSD FMLA CKD-EPI: >60 ML/MIN/{1.73_M2}
GLUCOSE SERPL-MCNC: 136 MG/DL (ref 70–99)
GLUCOSE SERPL-MCNC: 90 MG/DL (ref 70–99)
HCT VFR BLD AUTO: 29.5 % (ref 40.5–52.5)
HGB BLD-MCNC: 9.3 G/DL (ref 13.5–17.5)
LYMPHOCYTES # BLD: 1.7 K/UL (ref 1–5.1)
LYMPHOCYTES NFR BLD: 21.6 %
MCH RBC QN AUTO: 28.2 PG (ref 26–34)
MCHC RBC AUTO-ENTMCNC: 31.6 G/DL (ref 31–36)
MCV RBC AUTO: 89.2 FL (ref 80–100)
MONOCYTES # BLD: 0.7 K/UL (ref 0–1.3)
MONOCYTES NFR BLD: 9.4 %
NEUTROPHILS # BLD: 4.9 K/UL (ref 1.7–7.7)
NEUTROPHILS NFR BLD: 62.2 %
PHOSPHATE SERPL-MCNC: 2.4 MG/DL (ref 2.5–4.9)
PLATELET # BLD AUTO: 206 K/UL (ref 135–450)
PMV BLD AUTO: 7.9 FL (ref 5–10.5)
POTASSIUM SERPL-SCNC: 3.7 MMOL/L (ref 3.5–5.1)
POTASSIUM SERPL-SCNC: 3.9 MMOL/L (ref 3.5–5.1)
RBC # BLD AUTO: 3.3 M/UL (ref 4.2–5.9)
SODIUM SERPL-SCNC: 136 MMOL/L (ref 136–145)
SODIUM SERPL-SCNC: 137 MMOL/L (ref 136–145)
WBC # BLD AUTO: 7.9 K/UL (ref 4–11)

## 2024-03-07 PROCEDURE — 36415 COLL VENOUS BLD VENIPUNCTURE: CPT

## 2024-03-07 PROCEDURE — 1200000000 HC SEMI PRIVATE

## 2024-03-07 PROCEDURE — 94640 AIRWAY INHALATION TREATMENT: CPT

## 2024-03-07 PROCEDURE — 6370000000 HC RX 637 (ALT 250 FOR IP): Performed by: STUDENT IN AN ORGANIZED HEALTH CARE EDUCATION/TRAINING PROGRAM

## 2024-03-07 PROCEDURE — 85025 COMPLETE CBC W/AUTO DIFF WBC: CPT

## 2024-03-07 PROCEDURE — 6360000002 HC RX W HCPCS: Performed by: STUDENT IN AN ORGANIZED HEALTH CARE EDUCATION/TRAINING PROGRAM

## 2024-03-07 PROCEDURE — 2580000003 HC RX 258: Performed by: INTERNAL MEDICINE

## 2024-03-07 PROCEDURE — 2700000000 HC OXYGEN THERAPY PER DAY

## 2024-03-07 PROCEDURE — 6370000000 HC RX 637 (ALT 250 FOR IP): Performed by: INTERNAL MEDICINE

## 2024-03-07 PROCEDURE — 80069 RENAL FUNCTION PANEL: CPT

## 2024-03-07 PROCEDURE — 93308 TTE F-UP OR LMTD: CPT

## 2024-03-07 PROCEDURE — 71045 X-RAY EXAM CHEST 1 VIEW: CPT

## 2024-03-07 PROCEDURE — 94761 N-INVAS EAR/PLS OXIMETRY MLT: CPT

## 2024-03-07 PROCEDURE — 51702 INSERT TEMP BLADDER CATH: CPT

## 2024-03-07 RX ORDER — AMLODIPINE BESYLATE 5 MG/1
5 TABLET ORAL DAILY
Status: DISCONTINUED | OUTPATIENT
Start: 2024-03-07 | End: 2024-03-09 | Stop reason: HOSPADM

## 2024-03-07 RX ORDER — FUROSEMIDE 40 MG/1
40 TABLET ORAL DAILY
Status: DISCONTINUED | OUTPATIENT
Start: 2024-03-08 | End: 2024-03-09 | Stop reason: HOSPADM

## 2024-03-07 RX ADMIN — FUROSEMIDE 20 MG: 10 INJECTION, SOLUTION INTRAMUSCULAR; INTRAVENOUS at 09:11

## 2024-03-07 RX ADMIN — PANTOPRAZOLE SODIUM 40 MG: 40 TABLET, DELAYED RELEASE ORAL at 05:49

## 2024-03-07 RX ADMIN — TRAZODONE HYDROCHLORIDE 150 MG: 100 TABLET ORAL at 21:14

## 2024-03-07 RX ADMIN — SODIUM CHLORIDE, PRESERVATIVE FREE 10 ML: 5 INJECTION INTRAVENOUS at 09:11

## 2024-03-07 RX ADMIN — GUAIFENESIN 600 MG: 600 TABLET ORAL at 21:15

## 2024-03-07 RX ADMIN — IPRATROPIUM BROMIDE AND ALBUTEROL SULFATE 1 DOSE: 2.5; .5 SOLUTION RESPIRATORY (INHALATION) at 10:33

## 2024-03-07 RX ADMIN — FUROSEMIDE 20 MG: 10 INJECTION, SOLUTION INTRAMUSCULAR; INTRAVENOUS at 18:22

## 2024-03-07 RX ADMIN — AMLODIPINE BESYLATE 5 MG: 5 TABLET ORAL at 21:17

## 2024-03-07 RX ADMIN — LEVOTHYROXINE SODIUM 75 MCG: 0.07 TABLET ORAL at 05:49

## 2024-03-07 RX ADMIN — SODIUM CHLORIDE, PRESERVATIVE FREE 10 ML: 5 INJECTION INTRAVENOUS at 21:17

## 2024-03-07 RX ADMIN — GUAIFENESIN 600 MG: 600 TABLET ORAL at 09:11

## 2024-03-07 RX ADMIN — IPRATROPIUM BROMIDE AND ALBUTEROL SULFATE 1 DOSE: 2.5; .5 SOLUTION RESPIRATORY (INHALATION) at 20:42

## 2024-03-07 NOTE — PROGRESS NOTES
Sponge bath done and all linen changed. Back and buttocks wound dressing done with saline then applied with triad. Open area covered with mepilex.Wound condition improved, no bleeding noted at the back and mild at his buttocks.Procedure tolerated by pt, refused for pain meds when offered.Continuously repositioned at least 2 hourly and maintain with specialty bed. Sequential compression device reapplied.

## 2024-03-07 NOTE — PROCEDURES
Name: Tushar Block   : 1940   Interpreting Physician: Carmen Eduardo MD   Referring Physician: TIM Clemente  Date of EE2024      Clinical History: LOSS OF CONSCIOUSNESS    Current Antiepileptic Medications: Current Facility-Administered Medications: ipratropium 0.5 mg-albuterol 2.5 mg (DUONEB) nebulizer solution 1 Dose, 1 Dose, Inhalation, BID  furosemide (LASIX) injection 20 mg, 20 mg, IntraVENous, BID  Benzocaine-Menthol (CEPACOL) 1 lozenge, 1 lozenge, Oral, Q2H PRN  benzonatate (TESSALON) capsule 100 mg, 100 mg, Oral, TID PRN  guaiFENesin (MUCINEX) extended release tablet 600 mg, 600 mg, Oral, BID  calcium carbonate (TUMS) chewable tablet 500 mg, 1 tablet, Oral, TID PRN  traZODone (DESYREL) tablet 150 mg, 150 mg, Oral, Nightly  albuterol sulfate HFA (PROVENTIL;VENTOLIN;PROAIR) 108 (90 Base) MCG/ACT inhaler 2 puff, 2 puff, Inhalation, Q6H PRN  [Held by provider] gabapentin (NEURONTIN) tablet 600 mg, 600 mg, Oral, BID  levothyroxine (SYNTHROID) tablet 75 mcg, 75 mcg, Oral, Daily  pantoprazole (PROTONIX) tablet 40 mg, 40 mg, Oral, QAM AC  sodium chloride flush 0.9 % injection 5-40 mL, 5-40 mL, IntraVENous, 2 times per day  sodium chloride flush 0.9 % injection 5-40 mL, 5-40 mL, IntraVENous, PRN  0.9 % sodium chloride infusion, , IntraVENous, PRN  ondansetron (ZOFRAN-ODT) disintegrating tablet 4 mg, 4 mg, Oral, Q8H PRN **OR** ondansetron (ZOFRAN) injection 4 mg, 4 mg, IntraVENous, Q6H PRN  polyethylene glycol (GLYCOLAX) packet 17 g, 17 g, Oral, Daily PRN  acetaminophen (TYLENOL) tablet 650 mg, 650 mg, Oral, Q6H PRN **OR** acetaminophen (TYLENOL) suppository 650 mg, 650 mg, Rectal, Q6H PRN         Technical Summary:  The EEG was recorded in a digital format on a patient who is reported to be awake state during the recording. The patient was not sleep deprived prior to the EEG.    The recording revealed a normal background rhythm in the alpha frequency range that was maximal over the posterior

## 2024-03-07 NOTE — PROGRESS NOTES
Pt. Stat lock was removed d/t a blister on his L thigh and a rash on his R thigh. Removing the stat lock opened the blister. Blister has scant bleeding with serosanguineous fluid draining and was cleaned with wound cleanser and covered with a mepalex. Wound has been charted.

## 2024-03-07 NOTE — PROGRESS NOTES
flush  5-40 mL IntraVENous 2 times per day      Infusions:    sodium chloride 5 mL/hr at 03/06/24 0237     PRN Meds: Benzocaine-Menthol, 1 lozenge, Q2H PRN  benzonatate, 100 mg, TID PRN  calcium carbonate, 1 tablet, TID PRN  albuterol sulfate HFA, 2 puff, Q6H PRN  sodium chloride flush, 5-40 mL, PRN  sodium chloride, , PRN  ondansetron, 4 mg, Q8H PRN   Or  ondansetron, 4 mg, Q6H PRN  polyethylene glycol, 17 g, Daily PRN  acetaminophen, 650 mg, Q6H PRN   Or  acetaminophen, 650 mg, Q6H PRN        Labs and Imaging   XR CHEST PORTABLE    Result Date: 3/5/2024  XR CHEST PORTABLE Indication: decreased reponsivneness, more O2 requirement COMPARISON: 3/3/2024 Findings: Portable AP upright view of the chest was obtained. The patient is rotated. Sternotomy wires are noted. The heart is stably enlarged. Background interstitial pulmonary prominence is again noted although less severe/prominent in the interval. This likely reflects improving pulmonary edema. Perihilar opacity on the left persists. There is no pneumothorax. No significant effusion.     Improving pulmonary edema. Persistent left perihilar opacity    XR CHEST PORTABLE    Result Date: 3/3/2024  EXAM: XR CHEST PORTABLE. DATE: 3/3/2024 12:16 EST. INDICATION: decreased O2, sudden discoloration, difficult arousal COMPARISON: 2/29/2024 TECHNIQUE: Standard per department protocol. FINDINGS: Medical devices: Sternal wires present. Mild cardiomegaly. Mild diffuse airspace disease with a central and basilar predominance. There is pulmonary vascular indistinctness. No pneumothorax. Probable small left pleural effusion. Osseous structures unremarkable.     Diffuse airspace disease, suggestive of pulmonary edema. A component of pneumonia cannot be excluded. Electronically signed by Nathaniel Cantu MD    XR TIBIA FIBULA LEFT (2 VIEWS)    Result Date: 3/2/2024  EXAM: XR TIBIA FIBULA LEFT (2 VIEWS). DATE: 3/2/2024 6:00 EST. INDICATION: Wounds of unknown duration COMPARISON:  None TECHNIQUE: Standard per department protocol. VIEWS OBTAINED: 4 FINDINGS: There is osteoporosis. Dense vascular calcifications. No soft tissue gas or radiopaque foreign body. No acute osseous abnormality. The soft tissues are normal.     No acute osseous abnormality. Electronically signed by Nathaniel Cantu MD    XR TIBIA FIBULA RIGHT (2 VIEWS)    Result Date: 3/2/2024  EXAM: XR TIBIA FIBULA RIGHT (2 VIEWS). DATE: 3/2/2024 6:05 EST. INDICATION: Wound of unknown duration COMPARISON: None TECHNIQUE: Standard per department protocol. VIEWS OBTAINED: 4 FINDINGS: No acute fracture or dislocation/subluxation. There is osteoporosis. There are areas of soft tissue irregularity overlying the lateral aspect of the mid to distal third fibula. There are dense vascular calcifications. No radiopaque foreign body or soft tissue gas.     No acute osseous abnormality. Soft tissue irregularity as detailed compatible with provided history of wounds. Electronically signed by Nathaniel Cantu MD    XR CHEST PORTABLE    Result Date: 2/29/2024  Portable chest HISTORY: Pneumonia. COMPARISON: October 11, 2022. FINDINGS: Cardiomegaly is stable. There has been prior median sternotomy. Perihilar opacities in the left side are similar to the prior study. There are probable new areas of airspace disease in the right perihilar and right basilar regions. No pneumothorax is seen.     1. Suspected new airspace disease in the right lung as detailed above. In the proper clinical setting this likely reflects pneumonia. Follow-up is recommended to document resolution. Electronically signed by Gerry Jarquin DO      CBC:   Recent Labs     03/05/24  1032 03/06/24  0917 03/06/24  1107   WBC 8.0 8.7 9.1   HGB 8.6* 9.6* 9.8*    199 209       BMP:    Recent Labs     03/05/24  1032 03/06/24  0917 03/06/24  1107    140 141   K 4.2 4.0 3.8    103 105   CO2 23 27 28   BUN 26* 22* 21*   CREATININE 0.9 0.7* 0.7*   GLUCOSE 95 86 94

## 2024-03-07 NOTE — PROGRESS NOTES
Bowel sounds are normal.      Palpations: Abdomen is soft.   Musculoskeletal:      Right lower leg: No edema.      Left lower leg: No edema.   Skin:     General: Skin is warm and dry.   Neurological:      Mental Status: He is alert and oriented to person, place, and time.          WBC/Hgb/Hct/Plts:  7.9/9.3/29.5/206 (03/07 0748)           Assessment:     Principal Problem:    Sepsis (HCC)  Active Problems:    Venous stasis ulcer of right calf limited to breakdown of skin without varicose veins (HCC)    Transient unconsciousness  Resolved Problems:    * No resolved hospital problems. *      Time spent with patient and/or family: 30  Time reviewing records: 5  Time communicating with providers: 10    A total of 45 minutes spent with the patient and family on unit greater than 50% face to face time in counseling regarding palliative care and goals of care for the patient.     Adelia DUMONT  Inpatient Palliative Care  [phone]

## 2024-03-07 NOTE — PROGRESS NOTES
unresponsiveness - resolved  -Continue to assess mental status. Negative workup so far. EEG pending.     History of GI bleed    Paroxysmal A-fib  -Anticoagulation contraindicated in the setting of previous GI bleed.    Hypothyroidism  -Continue Synthroid.      Diet ADULT DIET; Dysphagia - Soft and Bite Sized; 4 carb choices (60 gm/meal); Low Fat/Low Chol/High Fiber/[place]; Low Sodium (2 gm)   DVT Prophylaxis [] Lovenox, []  Heparin, [x] SCDs, [] Ambulation,  [] Eliquis, [] Xarelto  [] Coumadin   Code Status Full Code   Disposition From: SNF  Expected Disposition: SNF vs hospice  Estimated Date of Discharge: over the weekend.   Patient requires continued admission due to downtrending hemoglobin, episode of altered mental status, IV diuresis   Surrogate Decision Maker/ POA On file     Personally reviewed Lab Studies and Imaging     Discussed management of the case with case management      Subjective:     Patient seen and evaluated at bedside. Asking me if he is cured. No concerns.       Review of Systems:      Pertinent positives and negatives discussed in HPI    Objective:     Intake/Output Summary (Last 24 hours) at 3/7/2024 1040  Last data filed at 3/7/2024 0919  Gross per 24 hour   Intake --   Output 2950 ml   Net -2950 ml        Vitals:   Vitals:    03/07/24 0021 03/07/24 0350 03/07/24 0910 03/07/24 1034   BP: (!) 154/70 (!) 158/69 (!) 164/71    Pulse: 80 80 83 95   Resp: 18 18 18 18   Temp: 98.5 °F (36.9 °C) 98 °F (36.7 °C) 98.2 °F (36.8 °C)    TempSrc: Oral Oral Oral    SpO2: 97% 94% 97% 98%   Weight:       Height:             Physical Exam:      General: NAD  Eyes: EOMI  ENT: neck supple  Cardiovascular: Regular rate.  Respiratory: Clear to auscultation  Gastrointestinal: Soft, non tender  Genitourinary: no suprapubic tenderness  Musculoskeletal: Multiple wounds noted.  Skin: warm, dry  Neuro:: Spasticity in bilateral upper extremities.           Medications:   Medications:    ipratropium 0.5 mg-albuterol 2.5 mg      Organism:   Lab Results   Component Value Date/Time    ORG Proteus mirabilis 02/29/2024 10:50 AM         Electronically signed by Jonathon Mendoza MD on 3/7/2024 at 10:40 AM

## 2024-03-07 NOTE — PROGRESS NOTES
Podiatric Surgery Daily Progress Note  Tushar Block      Subjective :   Patient seen and examined this am at the bedside.Patient much more alert and conversational this morning Patient denies any acute overnight events. Patient denies N/V/F/C/SOB. Patient denies calf pain, thigh pain, chest pain.     Review of Systems: A 12 point review of symptoms is unremarkable with the exception of the chief complaint. Patient specifically denies nausea, fever, vomiting, chills, shortness of breath, chest pain, abdominal pain, constipation or difficulty urinating.       Objective     BP (!) 158/69   Pulse 80   Temp 98 °F (36.7 °C) (Oral)   Resp 18   Ht 1.829 m (6')   Wt 95.6 kg (210 lb 12.2 oz)   SpO2 94%   BMI 28.58 kg/m²      I/O:  Intake/Output Summary (Last 24 hours) at 3/7/2024 0734  Last data filed at 3/7/2024 0350  Gross per 24 hour   Intake --   Output 2450 ml   Net -2450 ml                Wt Readings from Last 3 Encounters:   03/06/24 95.6 kg (210 lb 12.2 oz)   01/06/24 96.6 kg (212 lb 15.4 oz)   05/25/23 90.7 kg (200 lb)       LABS:    Recent Labs     03/06/24  0917 03/06/24  1107   WBC 8.7 9.1   HGB 9.6* 9.8*   HCT 29.4* 30.2*    209          Recent Labs     03/06/24  0917 03/06/24  1107    141   K 4.0 3.8    105   CO2 27 28   PHOS 2.5  --    BUN 22* 21*   CREATININE 0.7* 0.7*          Recent Labs     03/05/24  1032   PROT 6.7             LOWER EXTREMITY EXAMINATION    Dressing to b/l LE intact. No strikethrough noted to the external dressing. Scant amount of serosanguinous drainage noted to the internal layers of the dressing.     VASCULAR: DP and PT pulses faintly palpable. Upon handheld doppler examination the DP and PT are noted to be biphasic bilaterally. CFT is brisk to the digits of the foot b/l. Skin temperature is warm to cool from proximal to distal with no focal calor noted. Moderate 1+ pitting edema noted. No pain with calf compression b/l.     NEUROLOGIC: Gross and epicritic

## 2024-03-07 NOTE — CARE COORDINATION
DISCHARGE PLANNING:  This CM received a call from [facility], Valentine [facility] ([facility] [phone]) and patients son Wilmer ([phone]).   Currently, [facility] will not accept patient back at discharge unless he agrees to change code status and not continue to return to the hospital.   [facility] will not accept patient back without hospice services due to current needs being higher than AL can provide.    Palliative care following who spoke with patient again, who stated he wants to remain a full code and continue to come to the hospital for treatment.   Patient stated he would like to discuss further with his son.   Wilmer to come to bedside this afternoon to discuss with patient.     Discharge plan is currently pending.     Patient still requiring oxygen with new lung opacities. On IV Lasix.  Per MD, pulmonology consult to be placed.    CM team will continue to follow.  Ying Elam, RN Case Manager  [phone]

## 2024-03-07 NOTE — PROGRESS NOTES
03/07/24 1152   Encounter Summary   Encounter Overview/Reason  Follow-up   Service Provided For: Patient   Support System Children   Last Encounter  03/07/24  (MSR-Provided listening and encouragement/ exploring going home with pt and care team. No further vistis planned at this time with pt, due to discharge.)   Complexity of Encounter Moderate   Begin Time 1107   Spiritual/Emotional needs   Type Spiritual Support;Emotional Distress   Plan and Referrals   Plan/Referrals No future visits requested      offered spiritual and emotional support to pt. The pt shared she is accepting of her physical state. Pt offered supportive listening and exploring her spirituality to offer her support.  offered a space for pt to share, pt sharing about family dynamics and pt concerned with her son living with her. Pt finds hope in her grandchildren.  encouraged pt through being aware of her body and reaching out for emotional and spiritual support.  has no further visits planned, pt being discharged.  Lexy Ernst MDiv., BCC

## 2024-03-08 ENCOUNTER — APPOINTMENT (OUTPATIENT)
Dept: GENERAL RADIOLOGY | Age: 84
DRG: 698 | End: 2024-03-08
Payer: MEDICARE

## 2024-03-08 LAB
ALBUMIN SERPL-MCNC: 2.8 G/DL (ref 3.4–5)
ANION GAP SERPL CALCULATED.3IONS-SCNC: 13 MMOL/L (ref 3–16)
BASOPHILS # BLD: 0.1 K/UL (ref 0–0.2)
BASOPHILS NFR BLD: 0.8 %
BUN SERPL-MCNC: 14 MG/DL (ref 7–20)
CALCIUM SERPL-MCNC: 8 MG/DL (ref 8.3–10.6)
CHLORIDE SERPL-SCNC: 97 MMOL/L (ref 99–110)
CO2 SERPL-SCNC: 26 MMOL/L (ref 21–32)
CREAT SERPL-MCNC: 0.6 MG/DL (ref 0.8–1.3)
DEPRECATED RDW RBC AUTO: 18.1 % (ref 12.4–15.4)
EOSINOPHIL # BLD: 0.1 K/UL (ref 0–0.6)
EOSINOPHIL NFR BLD: 2.1 %
GFR SERPLBLD CREATININE-BSD FMLA CKD-EPI: >60 ML/MIN/{1.73_M2}
GLUCOSE SERPL-MCNC: 92 MG/DL (ref 70–99)
HCT VFR BLD AUTO: 30.9 % (ref 40.5–52.5)
HGB BLD-MCNC: 9.8 G/DL (ref 13.5–17.5)
LYMPHOCYTES # BLD: 1.2 K/UL (ref 1–5.1)
LYMPHOCYTES NFR BLD: 17.9 %
MCH RBC QN AUTO: 28.7 PG (ref 26–34)
MCHC RBC AUTO-ENTMCNC: 31.8 G/DL (ref 31–36)
MCV RBC AUTO: 90.3 FL (ref 80–100)
MONOCYTES # BLD: 0.6 K/UL (ref 0–1.3)
MONOCYTES NFR BLD: 8 %
NEUTROPHILS # BLD: 4.9 K/UL (ref 1.7–7.7)
NEUTROPHILS NFR BLD: 71.2 %
PHOSPHATE SERPL-MCNC: 2.5 MG/DL (ref 2.5–4.9)
PLATELET # BLD AUTO: 195 K/UL (ref 135–450)
PMV BLD AUTO: 7.6 FL (ref 5–10.5)
POTASSIUM SERPL-SCNC: 3.9 MMOL/L (ref 3.5–5.1)
RBC # BLD AUTO: 3.42 M/UL (ref 4.2–5.9)
SODIUM SERPL-SCNC: 136 MMOL/L (ref 136–145)
WBC # BLD AUTO: 6.9 K/UL (ref 4–11)

## 2024-03-08 PROCEDURE — 1200000000 HC SEMI PRIVATE

## 2024-03-08 PROCEDURE — 6370000000 HC RX 637 (ALT 250 FOR IP): Performed by: STUDENT IN AN ORGANIZED HEALTH CARE EDUCATION/TRAINING PROGRAM

## 2024-03-08 PROCEDURE — 6370000000 HC RX 637 (ALT 250 FOR IP): Performed by: INTERNAL MEDICINE

## 2024-03-08 PROCEDURE — 92526 ORAL FUNCTION THERAPY: CPT

## 2024-03-08 PROCEDURE — 2580000003 HC RX 258: Performed by: INTERNAL MEDICINE

## 2024-03-08 PROCEDURE — 94640 AIRWAY INHALATION TREATMENT: CPT

## 2024-03-08 PROCEDURE — 51702 INSERT TEMP BLADDER CATH: CPT

## 2024-03-08 PROCEDURE — 85025 COMPLETE CBC W/AUTO DIFF WBC: CPT

## 2024-03-08 PROCEDURE — 80069 RENAL FUNCTION PANEL: CPT

## 2024-03-08 PROCEDURE — 94761 N-INVAS EAR/PLS OXIMETRY MLT: CPT

## 2024-03-08 PROCEDURE — 36415 COLL VENOUS BLD VENIPUNCTURE: CPT

## 2024-03-08 PROCEDURE — 71045 X-RAY EXAM CHEST 1 VIEW: CPT

## 2024-03-08 RX ORDER — IPRATROPIUM BROMIDE AND ALBUTEROL SULFATE 2.5; .5 MG/3ML; MG/3ML
1 SOLUTION RESPIRATORY (INHALATION) EVERY 4 HOURS PRN
Status: DISCONTINUED | OUTPATIENT
Start: 2024-03-08 | End: 2024-03-09 | Stop reason: HOSPADM

## 2024-03-08 RX ADMIN — LEVOTHYROXINE SODIUM 75 MCG: 0.07 TABLET ORAL at 05:56

## 2024-03-08 RX ADMIN — SODIUM CHLORIDE, PRESERVATIVE FREE 10 ML: 5 INJECTION INTRAVENOUS at 21:07

## 2024-03-08 RX ADMIN — AMLODIPINE BESYLATE 5 MG: 5 TABLET ORAL at 11:45

## 2024-03-08 RX ADMIN — TRAZODONE HYDROCHLORIDE 150 MG: 100 TABLET ORAL at 21:04

## 2024-03-08 RX ADMIN — GUAIFENESIN 600 MG: 600 TABLET ORAL at 11:45

## 2024-03-08 RX ADMIN — FUROSEMIDE 40 MG: 40 TABLET ORAL at 11:45

## 2024-03-08 RX ADMIN — SODIUM CHLORIDE, PRESERVATIVE FREE 10 ML: 5 INJECTION INTRAVENOUS at 11:45

## 2024-03-08 RX ADMIN — IPRATROPIUM BROMIDE AND ALBUTEROL SULFATE 1 DOSE: 2.5; .5 SOLUTION RESPIRATORY (INHALATION) at 09:38

## 2024-03-08 RX ADMIN — PANTOPRAZOLE SODIUM 40 MG: 40 TABLET, DELAYED RELEASE ORAL at 05:56

## 2024-03-08 ASSESSMENT — PAIN SCALES - GENERAL: PAINLEVEL_OUTOF10: 0

## 2024-03-08 NOTE — PROGRESS NOTES
lower lumbar    BRONCHOSCOPY N/A 1/4/2024    BRONCHOSCOPY/TRANSBRONCHIAL LUNG BIOPSY/ CRYOPROBE BX/  BRUSHING /BAL performed by Nathaniel Celeste MD at [facility] ENDOSCOPY    CORONARY ARTERY BYPASS GRAFT  12/13/2013    CABG x 5 (Dr Green), svg to diag, om1 and 3, distal rca, kelly to lad.     CYSTOSCOPY N/A 1/10/2019    CYSTOSCOPY performed by Elia Mcfarland MD at [facility] OR    FOOT DEBRIDEMENT Left 5/29/2022    LEFT FOOT PARTIAL FIFTH RAY RESECTION WITH EXCISIONAL DEBRIDEMENT OF MUSCLE AND FASCIA, WITH APPLICATION OF GRAFT performed by Arnie Kruger DPM at [facility] OR    FOOT DEBRIDEMENT Right 9/2/2022    INCISION AND DRAINAGE PARTIAL 5TH RAY RESECTION RIGHT FOOT performed by Fauzia Richardson DPM at [facility] OR    FOOT SURGERY      HIP SURGERY Right 5/1/2015    ORIF    LEG SURGERY Right 11/2/2021    RIGHT LOWER EXTREMITY ADVANCEMENT FLAP AND SPLIT THICKNESS SKIN GRAFT PLACEMENT; (WOUND- 10 CM X 5.5 CM; CLOSURE- 6 CM X 5.5 CM; SKIN GRAFT- 7.2 CM X 5 CM) performed by Christiano Seymour MD at [facility] OR    SIGMOIDOSCOPY N/A 6/11/2020    SIGMOIDOSCOPY DIAGNOSTIC FLEXIBLE performed by Rex VALENTINE MD at [facility] ENDOSCOPY    TOE AMPUTATION Right 9/2/2022    . performed by Fauzia Richardson DPM at [facility] OR    UPPER GASTROINTESTINAL ENDOSCOPY N/A 5/4/2020    EGD BIOPSY performed by Rex VALENTINE MD at [facility] ENDOSCOPY    UPPER GASTROINTESTINAL ENDOSCOPY N/A 9/5/2022    EGD BIOPSY performed by Rex VALENTINE MD at [facility] ENDOSCOPY     History of Present Illness  Per admitting H&P: 02/29/2024  '83 y.o. male with a pmh of essential hypertension, GERD, CAD, status post CABG, chronic indwelling Iglesias catheter, MDRO, MRSA infection, hyperlipidemia, paroxysmal A-fib, not on chronic anticoagulation due to prior GI bleed and many other chronic medical conditions who presents from his SNF with complaints of vomiting for the past 2 days.  Patient was found to be febrile 202 °F, tachycardic to 120s with borderline blood pressure and required  acceptance, slowed but adequate mastication, positive swallow movement, good oral clearance, no overt s/s aspiration. Recommend resume Soft & Bite-Sized diet + thin liquids.  Cont goal  3/8/24:  Pt on 1L O2 via nasal canula, in bed agreeable to eating breakfast when SLP arrived. Pt tolerates trials of Soft & Bite-Sized diet texture (scrambled eggs), Puree texture (oatmeal), and Thin Liquids (juice) with no overt clinical s/s aspiration.  Per chart, breath sounds clear/diminished.    Goal met.  Discharge.    Goal 2: Patient/caregiver will demonstrate understanding of swallowing concerns/recommendations.  3/6: Reviewed purpose of visit, swallow function, recommendations. Pt verbalized comprehension.  Cont goal  3/8/24:  SLP educated pt re: anatomy and physiology of swallow, s/s aspiration to report, and diet/POC recommendations.  Pt states comprehension.   Goal met.  Discharge.    Education  In addition to education pertaining to specific goals, as documented above, SLP educated pt re: role of SLP and rationale for treatment tasks.  SLP educated pt re: anatomy and physiology of swallow, s/s aspiration to report, and diet/POC recommendations.  Pt states comprehension.      Pt goal: \"behave myself I guess\"  Pt discharge goal: \"back to the nursing home\"    Total treatment time: 20 minutes dysphagia tx    Plan:  Discharge from  at this time.  Recommended diet:   Soft & Bite-Sized (Level 6) diet with Thin (Level 0) liquids  Medication administration:  PO  Strategies:  - upright position  - encourage oral hygiene 2 x daily    Discharge Recommendation: No further speech therapy indicated  Discussed with RNReynaeta    Needs met prior to leaving room.    Call light within reach.    Electronically Signed by:  Kristin Gross M.A. CCC-SLP  Speech-Language Pathologist  SP. [phone]  Pager #[phone]    This document will serve as a discharge summary if patient discharges prior to next visit.

## 2024-03-08 NOTE — PROGRESS NOTES
Nutrition Note       NUTRITION RECOMMENDATIONS:   1. PO Diet: Continue current diet    2. ONS: not indicated.    NUTRITION ASSESSMENT:  Nutritional summary & status: LOS. Pt admitted for sepsis. Meal intake adequate at %. Receiving a Dysphagia-Soft and Bite Sized 4 carb choices Cardiac diet. No weight loss per EMR. Noted possible admit to Hospice or return to SNF. Identified to be at low nutrition risk at this time. Will continue to monitor through hospital stay.   Admission/PMH: Sepsis/BPH, Cdiff, ch back pain, GERD, HTN, B12 defic    MALNUTRITION ASSESSMENT  Context of Malnutrition: Acute Illness   Malnutrition Status: No malnutrition    NUTRITION DIAGNOSIS   No nutrition diagnosis at this time   NUTRITION INTERVENTION  Food and/or Nutrient Delivery:  Continue Current Diet  Nutrition Education/Counseling:  No recommendation at this time       The patient will still be monitored per nutrition standards of care.  Consult dietitian if nutrition interventions essential to patient care is needed.     Kevin Rios RD  Kevin:  [phone]  Office:  [phone]

## 2024-03-08 NOTE — PLAN OF CARE
Problem: Discharge Planning  Goal: Discharge to home or other facility with appropriate resources  Outcome: Progressing  Flowsheets (Taken 3/8/2024 0255)  Discharge to home or other facility with appropriate resources:   Identify barriers to discharge with patient and caregiver   Arrange for needed discharge resources and transportation as appropriate   Identify discharge learning needs (meds, wound care, etc)     Problem: Skin/Tissue Integrity  Goal: Absence of new skin breakdown  Description: 1.  Monitor for areas of redness and/or skin breakdown  2.  Assess vascular access sites hourly  3.  Every 4-6 hours minimum:  Change oxygen saturation probe site  4.  Every 4-6 hours:  If on nasal continuous positive airway pressure, respiratory therapy assess nares and determine need for appliance change or resting period.  Outcome: Progressing     Problem: Safety - Adult  Goal: Free from fall injury  Outcome: Progressing  Note: Remains free of fall. All safety measures are in place. Plan of care ongoing.     Problem: Skin/Tissue Integrity - Adult  Goal: Skin integrity remains intact  Outcome: Progressing     Problem: Pain  Goal: Verbalizes/displays adequate comfort level or baseline comfort level  Outcome: Progressing  Flowsheets (Taken 3/8/2024 0257)  Verbalizes/displays adequate comfort level or baseline comfort level:   Encourage patient to monitor pain and request assistance   Assess pain using appropriate pain scale   Administer analgesics based on type and severity of pain and evaluate response   Implement non-pharmacological measures as appropriate and evaluate response

## 2024-03-08 NOTE — PROGRESS NOTES
Podiatric Surgery Daily Progress Note  Tushar Block      Subjective :   Patient seen and examined this am at the bedside.Patient denies any acute overnight events. Patient denies N/V/F/C/SOB. Patient denies calf pain, thigh pain, chest pain.     Review of Systems: A 12 point review of symptoms is unremarkable with the exception of the chief complaint. Patient specifically denies nausea, fever, vomiting, chills, shortness of breath, chest pain, abdominal pain, constipation or difficulty urinating.       Objective     /63   Pulse 94   Temp 97.9 °F (36.6 °C) (Oral)   Resp 18   Ht 1.829 m (6')   Wt 95.6 kg (210 lb 12.2 oz)   SpO2 96%   BMI 28.58 kg/m²      I/O:  Intake/Output Summary (Last 24 hours) at 3/8/2024 0504  Last data filed at 3/7/2024 2315  Gross per 24 hour   Intake 240 ml   Output 2200 ml   Net -1960 ml                Wt Readings from Last 3 Encounters:   03/06/24 95.6 kg (210 lb 12.2 oz)   01/06/24 96.6 kg (212 lb 15.4 oz)   05/25/23 90.7 kg (200 lb)       LABS:    Recent Labs     03/06/24  1107 03/07/24  0748   WBC 9.1 7.9   HGB 9.8* 9.3*   HCT 30.2* 29.5*    206          Recent Labs     03/07/24  0748 03/07/24  0957    137   K 3.9 3.7    101   CO2 35* 27   PHOS 2.4*  --    BUN 18 16   CREATININE 0.6* 0.7*          Recent Labs     03/05/24  1032   PROT 6.7             LOWER EXTREMITY EXAMINATION    Dressing to b/l LE intact. No strikethrough noted to the external dressing. Scant amount of serosanguinous drainage noted to the internal layers of the dressing.     VASCULAR: DP and PT pulses faintly palpable. Upon handheld doppler examination the DP and PT are noted to be biphasic bilaterally. CFT is brisk to the digits of the foot b/l. Skin temperature is warm to cool from proximal to distal with no focal calor noted. Moderate 1+ pitting edema noted. No pain with calf compression b/l.     NEUROLOGIC: Gross and epicritic sensation is diminished b/l. Protective sensation is  with use of either prevalon boots or heels floated off the bed with use of pillows.  Podiatry will continue with local wound care while in house.    Patient seen and evaluated bedside with Dr. Fauzia Richardson DPM.    Tanna Mccord DPM   Podiatric Resident PGY2  Pager [phone] or Ramandeep  3/8/2024, 5:04 AM     Patient was seen and evaluated at bedside.  Agree with residents assessment and treatment plan.  Fauzia Richardson DPM

## 2024-03-08 NOTE — RT PROTOCOL NOTE
increased work of breathing using Per Protocol order mode.        4-6 - enter or revise RT Bronchodilator order(s) to two equivalent RT bronchodilator orders with one order with BID Frequency and one order with Frequency of every 4 hours PRN wheezing or increased work of breathing using Per Protocol order mode.        7-10 - enter or revise RT Bronchodilator order(s) to two equivalent RT bronchodilator orders with one order with TID Frequency and one order with Frequency of every 4 hours PRN wheezing or increased work of breathing using Per Protocol order mode.       11-13 - enter or revise RT Bronchodilator order(s) to one equivalent RT bronchodilator order with QID Frequency and an Albuterol order with Frequency of every 4 hours PRN wheezing or increased work of breathing using Per Protocol order mode.      Greater than 13 - enter or revise RT Bronchodilator order(s) to one equivalent RT bronchodilator order with every 4 hours Frequency and an Albuterol order with Frequency of every 2 hours PRN wheezing or increased work of breathing using Per Protocol order mode.     RT to enter RT Home Evaluation for COPD & MDI Assessment order using Per Protocol order mode.    Electronically signed by Fanny Solorzano RCP on 3/8/2024 at 9:42 AM

## 2024-03-08 NOTE — PROGRESS NOTES
Palliative Care Chart Review  and Check in Note:     NAME:  Tushar Block  Admit Date: 2/29/2024  Hospital Day:  Hospital Day: 9   Current Code status: Full Code    Palliative care is continuing to following Mr. Block for symptom management,  and goals of care discussion as needed. Patient's chart reviewed today 3/8/24.      Received VM from pt's son Wilmer. He has reached out to [facility] to see if they will enroll the pt. They will be at the hospital today. Called CM Ying and notified her.       The following are the currently established goals/code status, and Symptom management.     Goals of care: Per my conversation with the pt yesterday he does want to continue to come back to the hospital for treatment in the future. He reported that his son Wilmer assists him with decision making. Pt's son Wilmer is concerned about pt's cognition, he is alert and oriented x3 however forgetful per son.     Code status: Full code, discussed again with the pt yesterday. He reported that he needed to discuss it with his son Wilmer.     Discharge plan: [facility] vs LTC    Addendum: Spoke with Susanna with [facility], they can accept pt. Plan to discharge pt tomorrow once equipment can be set up. D/w Dr. Cosme.       Adelia Eastman, TIM - CNP  03/08/24  10:36 AM

## 2024-03-08 NOTE — PROGRESS NOTES
Hospice NP came to bedside and assessed pt. Him and bedside RN turned pt. And NP assessed pt.s skin and his wounds.He determined that he has to wait for [facility] to update them on the pt.s prognosis. After getting the information and speaking with his manager, they will determine if pt. Is eligible for hospice or should go to a SNF.

## 2024-03-08 NOTE — DISCHARGE SUMMARY
V2.0  Discharge Summary    Name:  Tushar Block /Age/Sex: 1940 (83 y.o. male)   Admit Date: 2024  Discharge Date: 3/8/24    MRN & CSN:  [id number] & [id number] Encounter Date and Time 3/8/24 6:07 PM EST    Attending:  Jonathon Chester* Discharging Provider: Jonathon Mendoza MD       Hospital Course:     83-year-old male presenting from SNF initially with vomiting but admitted for possible sepsis secondary to UTI/pneumonia.  He has a history of GERD, CAD status post CABG, chronic indwelling Igleisas catheter, b baclofen pump, paroxysmal A-fib not on anticoagulation due to GI bleed, MDRO, chronic wounds.  Was initially started on broad-spectrum antibiotics and with negative blood cultures was slowly being tailored.  His urine is growing Proteus mirabilis.  Chest x-ray concerning for pneumonia of the right lung.  MRSA negative.  Podiatry and wound care consulted for stasis ulcers of legs status post IV fluids.  Also started on IV Lasix due to concerns for pulmonary edema and was net 12.6L negative during admission. Succesfully weaned off oxygen.     Did also have a rapid response on 3/6/2024 that resolve spontaneously with  negative workup so far.  EEG without epileptiform activity.     On 3/8/2024 patient was deemed fit for discharge.      Sepsis possibly secondary to catheter associated UTI/possible pneumonia -improving   -Status post 7 days completed antibiotic course.     Hypoxia  -Suspected 2/2 tu PNA and volume overload. Completely weaned off oxygen at discharge.      Extensive pressure wounds in the back and leg  -Wound care and podiatry saw patient throughout hospitalization. Needs to continue at discharge.      Electrolyte abnormalities  -Corrected at discharge     Possible volume overload  -Net 12.6L negative. Discharged on Lasix 40mg PO daily.      CAD status post CABG     Anemia of chronic disease  -Fluctuated throughout hospitalization. Stable at discharge.        Episode of  results for input(s): \"PROBNP\" in the last 72 hours.  UA:  Lab Results   Component Value Date/Time    NITRU Negative 02/29/2024 10:50 AM    COLORU Yellow 02/29/2024 10:50 AM    PHUR 8.5 02/29/2024 10:50 AM    LABCAST 0-1 Hyaline 12/16/2013 05:20 PM    WBCUA 21-50 02/29/2024 10:50 AM    RBCUA 5-10 02/29/2024 10:50 AM    MUCUS 1+ 01/26/2022 10:01 PM    YEAST Present 01/17/2022 05:25 PM    BACTERIA 2+ 02/29/2024 10:50 AM    CLARITYU SL CLOUDY 02/29/2024 10:50 AM    SPECGRAV 1.020 02/29/2024 10:50 AM    LEUKOCYTESUR LARGE 02/29/2024 10:50 AM    UROBILINOGEN 0.2 02/29/2024 10:50 AM    BILIRUBINUR Negative 02/29/2024 10:50 AM    BLOODU MODERATE 02/29/2024 10:50 AM    GLUCOSEU Negative 02/29/2024 10:50 AM    KETUA Negative 02/29/2024 10:50 AM    AMORPHOUS 1+ 02/29/2024 10:50 AM     Urine Cultures:   Lab Results   Component Value Date/Time    LABURIN 75,000 CFU/ml 02/29/2024 10:50 AM     Blood Cultures:   Lab Results   Component Value Date/Time    BC No Growth after 4 days of incubation. 02/29/2024 10:50 AM     Lab Results   Component Value Date/Time    BLOODCULT2 No Growth after 4 days of incubation. 02/29/2024 10:50 AM     Organism:   Lab Results   Component Value Date/Time    ORG Proteus mirabilis 02/29/2024 10:50 AM       Time Spent Discharging patient 35 minutes    Electronically signed by Jonathon Mendoza MD on 3/9/2024 at 12:04 PM

## 2024-03-08 NOTE — CARE COORDINATION
DISCHARGE PLANNIN  Chart reviewed.  Patient is from [place] ([facility] [phone]) with [facility].   [facility] will not accept patient back at discharge due to his full code status and continuously going in and out of the hospital.  [facility] with not accept patient back without hospice services due to current needs being higher than AL can provide.     Palliative care following, who let this CM know that patients son Wilmer has reached out to [facility] to see if they would enroll patient.   [place] came to bedside to evaluate patient today (Susanna [phone]) and stated they need to discuss with their team to see if they can admit.   She is hopeful to give me an answer within the hour.     If patient returns to his [facility] today he will need the home O2 evaluation completed and home oxygen set up.    Discharge order noted but plan is currently pending.  If patient is unable to return to [facility], we will need to discuss with Wilmer options; patient will likely need LTC placement.    UPDATE: [id number]  Attempted to call Susanna with [facility] ([phone]) for update with no success. Voicemail left with my callback number and weekend CM number.     UPDATE: [id number]  This CM received a call from Susanna who stated they ARE able to accept patient and help him return back to his [facility]. They need to order equipment and that will not be delivered until tomorrow so they want to transport tomorrow afternoon.   Weekend CM can call main number [phone] and speak to Vicky (manager on call).     CM team will continue to follow.  Ying Elam RN Case Manager  [phone]

## 2024-03-09 VITALS
DIASTOLIC BLOOD PRESSURE: 63 MMHG | BODY MASS INDEX: 28.55 KG/M2 | OXYGEN SATURATION: 96 % | RESPIRATION RATE: 18 BRPM | HEIGHT: 72 IN | TEMPERATURE: 98.2 F | WEIGHT: 210.76 LBS | SYSTOLIC BLOOD PRESSURE: 133 MMHG | HEART RATE: 75 BPM

## 2024-03-09 LAB
ALBUMIN SERPL-MCNC: 3 G/DL (ref 3.4–5)
ALBUMIN SERPL-MCNC: 3.1 G/DL (ref 3.4–5)
ALBUMIN/GLOB SERPL: 0.7 {RATIO} (ref 1.1–2.2)
ALP SERPL-CCNC: 113 U/L (ref 40–129)
ALT SERPL-CCNC: 6 U/L (ref 10–40)
AMMONIA PLAS-SCNC: 14 UMOL/L (ref 16–60)
ANION GAP SERPL CALCULATED.3IONS-SCNC: 11 MMOL/L (ref 3–16)
ANION GAP SERPL CALCULATED.3IONS-SCNC: 14 MMOL/L (ref 3–16)
AST SERPL-CCNC: 10 U/L (ref 15–37)
BASE EXCESS BLDV CALC-SCNC: 4 MMOL/L (ref -3–3)
BASOPHILS # BLD: 0 K/UL (ref 0–0.2)
BASOPHILS NFR BLD: 0.6 %
BILIRUB SERPL-MCNC: 0.3 MG/DL (ref 0–1)
BUN SERPL-MCNC: 12 MG/DL (ref 7–20)
BUN SERPL-MCNC: 12 MG/DL (ref 7–20)
CA-I BLD-SCNC: 1.08 MMOL/L (ref 1.12–1.32)
CALCIUM SERPL-MCNC: 8.1 MG/DL (ref 8.3–10.6)
CALCIUM SERPL-MCNC: 8.2 MG/DL (ref 8.3–10.6)
CHLORIDE SERPL-SCNC: 88 MMOL/L (ref 99–110)
CHLORIDE SERPL-SCNC: 88 MMOL/L (ref 99–110)
CO2 BLDV-SCNC: 28 MMOL/L
CO2 SERPL-SCNC: 25 MMOL/L (ref 21–32)
CO2 SERPL-SCNC: 27 MMOL/L (ref 21–32)
CREAT SERPL-MCNC: 0.6 MG/DL (ref 0.8–1.3)
CREAT SERPL-MCNC: 0.6 MG/DL (ref 0.8–1.3)
DEPRECATED RDW RBC AUTO: 17.7 % (ref 12.4–15.4)
EKG ATRIAL RATE: 73 BPM
EKG DIAGNOSIS: NORMAL
EKG P AXIS: 50 DEGREES
EKG P-R INTERVAL: 202 MS
EKG Q-T INTERVAL: 438 MS
EKG QRS DURATION: 84 MS
EKG QTC CALCULATION (BAZETT): 482 MS
EKG R AXIS: 37 DEGREES
EKG T AXIS: 59 DEGREES
EKG VENTRICULAR RATE: 73 BPM
EOSINOPHIL # BLD: 0 K/UL (ref 0–0.6)
EOSINOPHIL NFR BLD: 0.4 %
GFR SERPLBLD CREATININE-BSD FMLA CKD-EPI: >60 ML/MIN/{1.73_M2}
GFR SERPLBLD CREATININE-BSD FMLA CKD-EPI: >60 ML/MIN/{1.73_M2}
GLUCOSE BLD-MCNC: 116 MG/DL (ref 70–99)
GLUCOSE BLD-MCNC: 116 MG/DL (ref 70–99)
GLUCOSE SERPL-MCNC: 104 MG/DL (ref 70–99)
GLUCOSE SERPL-MCNC: 122 MG/DL (ref 70–99)
HCO3 BLDV-SCNC: 27.2 MMOL/L (ref 23–29)
HCT VFR BLD AUTO: 29.5 % (ref 40.5–52.5)
HGB BLD-MCNC: 9.9 G/DL (ref 13.5–17.5)
LACTATE BLD-SCNC: 1.48 MMOL/L (ref 0.4–2)
LYMPHOCYTES # BLD: 0.8 K/UL (ref 1–5.1)
LYMPHOCYTES NFR BLD: 10.4 %
MAGNESIUM SERPL-MCNC: 1.5 MG/DL (ref 1.8–2.4)
MCH RBC QN AUTO: 29.3 PG (ref 26–34)
MCHC RBC AUTO-ENTMCNC: 33.8 G/DL (ref 31–36)
MCV RBC AUTO: 86.9 FL (ref 80–100)
MONOCYTES # BLD: 0.4 K/UL (ref 0–1.3)
MONOCYTES NFR BLD: 5 %
NEUTROPHILS # BLD: 6.7 K/UL (ref 1.7–7.7)
NEUTROPHILS NFR BLD: 83.6 %
OSMOLALITY SERPL: 286 MOSM/KG (ref 278–305)
PCO2 BLDV: 36.1 MM HG (ref 40–50)
PERFORMED ON: ABNORMAL
PERFORMED ON: ABNORMAL
PH BLDV: 7.49 [PH] (ref 7.35–7.45)
PHOSPHATE SERPL-MCNC: 2.4 MG/DL (ref 2.5–4.9)
PLATELET # BLD AUTO: 251 K/UL (ref 135–450)
PMV BLD AUTO: 7.6 FL (ref 5–10.5)
PO2 BLDV: 60 MM HG
POC SAMPLE TYPE: ABNORMAL
POTASSIUM BLD-SCNC: 3.6 MMOL/L (ref 3.5–5.1)
POTASSIUM SERPL-SCNC: 3.7 MMOL/L (ref 3.5–5.1)
POTASSIUM SERPL-SCNC: 3.8 MMOL/L (ref 3.5–5.1)
PROT SERPL-MCNC: 7.4 G/DL (ref 6.4–8.2)
RBC # BLD AUTO: 3.39 M/UL (ref 4.2–5.9)
SAO2 % BLDV: 93 %
SODIUM BLD-SCNC: 130 MMOL/L (ref 136–145)
SODIUM SERPL-SCNC: 126 MMOL/L (ref 136–145)
SODIUM SERPL-SCNC: 127 MMOL/L (ref 136–145)
VIT B12 SERPL-MCNC: 1414 PG/ML (ref 211–911)
WBC # BLD AUTO: 8 K/UL (ref 4–11)

## 2024-03-09 PROCEDURE — 93010 ELECTROCARDIOGRAM REPORT: CPT | Performed by: INTERNAL MEDICINE

## 2024-03-09 PROCEDURE — 84132 ASSAY OF SERUM POTASSIUM: CPT

## 2024-03-09 PROCEDURE — 95717 EEG PHYS/QHP 2-12 HR W/O VID: CPT | Performed by: PSYCHIATRY & NEUROLOGY

## 2024-03-09 PROCEDURE — 6370000000 HC RX 637 (ALT 250 FOR IP): Performed by: INTERNAL MEDICINE

## 2024-03-09 PROCEDURE — 82330 ASSAY OF CALCIUM: CPT

## 2024-03-09 PROCEDURE — 6370000000 HC RX 637 (ALT 250 FOR IP): Performed by: STUDENT IN AN ORGANIZED HEALTH CARE EDUCATION/TRAINING PROGRAM

## 2024-03-09 PROCEDURE — 84295 ASSAY OF SERUM SODIUM: CPT

## 2024-03-09 PROCEDURE — 82947 ASSAY GLUCOSE BLOOD QUANT: CPT

## 2024-03-09 PROCEDURE — 93005 ELECTROCARDIOGRAM TRACING: CPT

## 2024-03-09 PROCEDURE — 83605 ASSAY OF LACTIC ACID: CPT

## 2024-03-09 PROCEDURE — 83735 ASSAY OF MAGNESIUM: CPT

## 2024-03-09 PROCEDURE — 82140 ASSAY OF AMMONIA: CPT

## 2024-03-09 PROCEDURE — 6360000002 HC RX W HCPCS

## 2024-03-09 PROCEDURE — 82803 BLOOD GASES ANY COMBINATION: CPT

## 2024-03-09 PROCEDURE — 36415 COLL VENOUS BLD VENIPUNCTURE: CPT

## 2024-03-09 PROCEDURE — 51702 INSERT TEMP BLADDER CATH: CPT

## 2024-03-09 PROCEDURE — 80053 COMPREHEN METABOLIC PANEL: CPT

## 2024-03-09 PROCEDURE — 6360000002 HC RX W HCPCS: Performed by: NURSE PRACTITIONER

## 2024-03-09 PROCEDURE — 2580000003 HC RX 258: Performed by: INTERNAL MEDICINE

## 2024-03-09 PROCEDURE — 2580000003 HC RX 258

## 2024-03-09 PROCEDURE — 83930 ASSAY OF BLOOD OSMOLALITY: CPT

## 2024-03-09 PROCEDURE — 85025 COMPLETE CBC W/AUTO DIFF WBC: CPT

## 2024-03-09 PROCEDURE — 2500000003 HC RX 250 WO HCPCS

## 2024-03-09 PROCEDURE — 82607 VITAMIN B-12: CPT

## 2024-03-09 RX ORDER — AMLODIPINE BESYLATE 5 MG/1
5 TABLET ORAL DAILY
Qty: 30 TABLET | Refills: 3
Start: 2024-03-10

## 2024-03-09 RX ORDER — MAGNESIUM SULFATE IN WATER 40 MG/ML
2000 INJECTION, SOLUTION INTRAVENOUS ONCE
Status: COMPLETED | OUTPATIENT
Start: 2024-03-09 | End: 2024-03-09

## 2024-03-09 RX ORDER — LABETALOL HYDROCHLORIDE 5 MG/ML
10 INJECTION, SOLUTION INTRAVENOUS ONCE
Status: COMPLETED | OUTPATIENT
Start: 2024-03-09 | End: 2024-03-09

## 2024-03-09 RX ADMIN — PANTOPRAZOLE SODIUM 40 MG: 40 TABLET, DELAYED RELEASE ORAL at 06:18

## 2024-03-09 RX ADMIN — SODIUM PHOSPHATE, MONOBASIC, MONOHYDRATE AND SODIUM PHOSPHATE, DIBASIC, ANHYDROUS 10 MMOL: 142; 276 INJECTION, SOLUTION INTRAVENOUS at 10:24

## 2024-03-09 RX ADMIN — LEVOTHYROXINE SODIUM 75 MCG: 0.07 TABLET ORAL at 06:18

## 2024-03-09 RX ADMIN — LABETALOL HYDROCHLORIDE 10 MG: 5 INJECTION, SOLUTION INTRAVENOUS at 01:02

## 2024-03-09 RX ADMIN — AMLODIPINE BESYLATE 5 MG: 5 TABLET ORAL at 10:03

## 2024-03-09 RX ADMIN — MAGNESIUM SULFATE HEPTAHYDRATE 2000 MG: 40 INJECTION, SOLUTION INTRAVENOUS at 06:25

## 2024-03-09 RX ADMIN — FUROSEMIDE 40 MG: 40 TABLET ORAL at 10:03

## 2024-03-09 RX ADMIN — SODIUM CHLORIDE: 9 INJECTION, SOLUTION INTRAVENOUS at 06:23

## 2024-03-09 RX ADMIN — SODIUM CHLORIDE, PRESERVATIVE FREE 10 ML: 5 INJECTION INTRAVENOUS at 10:03

## 2024-03-09 RX ADMIN — GUAIFENESIN 600 MG: 600 TABLET ORAL at 10:03

## 2024-03-09 NOTE — PROGRESS NOTES
[facility] Nursing Note    TODAYS DATE:  3/9/2024    Time: 0251    Headband Status: Applied     Recorder: Recording started      Skin Assessment under headband:intact    Highest Seizure [place] Percentage past hour: 0 % 5 minutes after application    0% seizure burden at 0356    Seizure [place] 0-10% - Continue to monitor and complete 2-hour study.  Seizure [place] 11-89% - Epileptiform activity present. Notify provider for next steps.  Seizure [place] >/= 90% - Epileptiform activity consistent with Status Epilepticus. Immediately notify provider.     *Patients with Seizure [place] above 10% that persists may require a study longer than 2 hours. Maximum recording duration is 24 hours. Please update provider with a persistent increase in Seizure [place] above 10%.     Nurse eSignature: Electronically signed by Annika Ramírez RN on 3/9/2024 at 3:48 AM

## 2024-03-09 NOTE — PLAN OF CARE
Rapid response called at 02:30 AM for episode of unresponsiveness. Was called to bedside by the ICU resident team. Per patient's RN, the patient's HR was noted to drop into the 30's on tele, prompting her to check on the patient. She reportedly found the patient with his eyes open, moaning in the bed. He did not have any appreciable gaze deviation or shaking/rhythmic movements at this time. He was unresponsive to pain and verbal stimulation; thus a rapid response was called.     Per ICU resident team, when they arrived to the room the patient was lying in bed, with repeat VS's being stable. Patient was not initially following commands and only spoke a few words. More attempts were made to stimulate the patient, and he reportedly started to become more alert and follow some simple commands and give a weak thumbs up. Per the patient's RN, the patient does not have good motility and use of his BLE's at baseline and is nonambulatory. She also reported that the patient has had a waxing and wanning exam since being in the hospital.  She stated the patient was more alert and more brisk in following commands at the beginning of the shift.     Upon my arrival to the room, the patient was resting in bed, alert, nontoxic in appearance and in no apparent distress. He would speak only a few words at first, but intermittently throughout the exam did start speaking short but clear sentences. Full neurologic exam performed, see below. Per ICU team, patient exam is much improved at time of my arrival to the rapid, compared to their initial exam. Repeat labs have been ordered, ECG ordered and [facility] Rapid EEG has been ordered. ECG shows NSR without ischemic changes. [facility] EEG connected to patient and initial seizure burden read as 0%. Labs pending.     Per chart review, patient had a similar rapid response called on 3/5 with complaint of unresponsiveness. At that time, complete work-up, which included labs and noncontrast HCT  was unremarkable. Neurology services was consulted and a spot EEG was obtained on 3/6 which was also unremarkable.        Assessment:   During my examination, patient did not have any apparent asymmetric movements/weakness in his upper or lower extremities.  Although his lower extremities were weak, albeit the patient is nonambulatory at baseline, his weakness was consistent with prior examinations from previous providers with his strength being a 2/5 in his BLE's. He had good strength in both his upper extremities and was purposely moving them.  He would not answer orientation questions but would speak short phrases and sentences that was clear without any sort of dysarthria.  When testing his sensation, patient did make multiple attempts to swat/hit me and stated very clearly, \"leave me alone god dammit.\"  Given no apparent CN abnormalities or asymmetric weakness, I have lower suspicion to suspect that this unresponsive episode is due to stroke.  Given the context that patient's unresponsiveness was in the setting of severe bradycardia I suspect that this episode of unresponsiveness could be related to syncope, especially since by the time of my arrival his exam continued to improve.  However, given the reports that patient has continued to have a waxing and waning mentation and physical exam, seizure still cannot be fully excluded on the basis of 1 spot EEG alone.       Plan:  - Agree with [facility] rapid EEG.   - Can consider continuous EEG later this morning, however, per chart review, patient is planning to discharge to hospice later this morning. Would need to discuss with patient and his son if they would want cvEEG or not.  - Will hold starting AED at this time.   - Recommend further cardiac workup given bradycardia into the 30s if aligning with patient's GOC -> discussed with ICU team. Defer cardiac management with ICU resident team.   -Will discuss case with attending physician and day neurology team.

## 2024-03-09 NOTE — PROGRESS NOTES
Pt is alert to self and place. Pt discharging back to facility with hospise. Pt's IV and telemetry removed. Pt left with chronic schneider in place. Pt left with all personal belongings. Pt transported to facility via EMS. Report called to nurse at the facility and [facility] nurse notified of time that pt was picked up by transport.

## 2024-03-09 NOTE — SIGNIFICANT EVENT
Rapid response called around 2:30 am. ICU team responded immediately. On presentation, nurse with concerns of poor responsiveness compared to his baseline.     At bedside, pt lying in bed. Initially, not following commands and at most spoke a word. But on further examination, noted to have waxing and waning responsiveness and ability to state an entire sentence when painful stimulus was applied. Has purposeful movements and occasionally follows commands, such as attempting a thumbs up. Prior recorded neurological examinations were read, and prior reports do state occasional slurred speech and 4/5 strength in UE, and 2/5 strength in LE which is consistent with examination. Per nurse, pt's presentation does tend to wax and wane.    Of note, pt with similar rapid response called on 3/5 with complaint of unresponsiveness. At that time, complete work-up was overall normal, and Neurology was also consulted for concern for possible seizure. Routine Eeg negative for seizure-like activity and overall picture seemed more consistent with hypoactive delirium. CT head without acute abnormalities.    Ordered a comprehensive work-up with VBG, CBC, RFP, Mg, ammonia, and B12. EKG ordered. Also ordered ceribell to assess for possible seizure although not suspicious. Unlikely to be stroke given no unilateral/focal deficits noted and waxing and waning responsiveness that is not consistent with stroke-like presentation. Neuro NP, Arlyn, also present in room who agreed with plan and assessment.     Will follow-up work-up     UPDATE 5:25 AM 3/9/24  Work-up overall nonconclusive. Hypo Mg and phos were replenished. Pt with drop in sodium to 126 compared to previous labs. Hyponatremia work-up (urine osm/serum osm/urine sodium) ordered. Will defer plan of care to primary team.

## 2024-03-09 NOTE — PROGRESS NOTES
V2.0    [facility] Progress Note      Name:  Tushar Block /Age/Sex: 1940  (83 y.o. male)   MRN & CSN:  [id number] & [id number] Encounter Date/Time: 3/9/2024 3:43 PM EST   Location:  6322/6322-01 PCP: Mario Alberto Tran     Attending:Jonathon Chester*       Hospital Day: 10    Assessment and Recommendations   83-year-old male presenting from SNF initially with vomiting but admitted for possible sepsis secondary to UTI/pneumonia.  He has a history of GERD, CAD status post CABG, chronic indwelling Iglesias catheter, b baclofen pump, paroxysmal A-fib not on anticoagulation due to GI bleed, MDRO, chronic wounds.  Was initially started on broad-spectrum antibiotics and with negative blood cultures was slowly being tailored.  His urine is growing Proteus mirabilis.  Chest x-ray concerning for pneumonia of the right lung.  MRSA negative.  Podiatry and wound care consulted for stasis ulcers of legs status post IV fluids.  Also started on IV Lasix due to concerns for pulmonary edema and is net 14.4.L negatice since admission.  Did have another episode of unexplained unresponsiveness that resolved spontaneously     Did also have a rapid response on 3/6/2024 that resolve spontaneously with  negative workup so far.  EEG without epileptiform activity.     Palliative care actively in case and active discussions with patient's son.     Patient now having a new triangular opacity in the left upper lung.     Sepsis possibly secondary to catheter associated UTI/possible pneumonia -improving   -Status post 7 days antibiotic course.     Hypoxia -resolved   -SPECT secondary to volume overload and a treated pneumonia     Extensive pressure wounds in the back and leg  -Wound care and podiatry.     Hyponatremia  -Hold Lasix at discharge     Possible volume overload  -Treated with Lasix and -14.4 L     CAD status post CABG     Downtrending hemoglobin level  -Stable at discharge     Episode of unresponsiveness -  BILIRUBINUR Negative 02/29/2024 10:50 AM    BLOODU MODERATE 02/29/2024 10:50 AM    GLUCOSEU Negative 02/29/2024 10:50 AM    KETUA Negative 02/29/2024 10:50 AM    AMORPHOUS 1+ 02/29/2024 10:50 AM     Urine Cultures:   Lab Results   Component Value Date/Time    LABURIN 75,000 CFU/ml 02/29/2024 10:50 AM     Blood Cultures:   Lab Results   Component Value Date/Time    BC No Growth after 4 days of incubation. 02/29/2024 10:50 AM     Lab Results   Component Value Date/Time    BLOODCULT2 No Growth after 4 days of incubation. 02/29/2024 10:50 AM     Organism:   Lab Results   Component Value Date/Time    ORG Proteus mirabilis 02/29/2024 10:50 AM         Electronically signed by Jonathon Mendoza MD on 3/9/2024 at 3:43 PM

## 2024-03-09 NOTE — PLAN OF CARE
Problem: Discharge Planning  Goal: Discharge to home or other facility with appropriate resources  Outcome: Adequate for Discharge     Problem: Skin/Tissue Integrity  Goal: Absence of new skin breakdown  Description: 1.  Monitor for areas of redness and/or skin breakdown  2.  Assess vascular access sites hourly  3.  Every 4-6 hours minimum:  Change oxygen saturation probe site  4.  Every 4-6 hours:  If on nasal continuous positive airway pressure, respiratory therapy assess nares and determine need for appliance change or resting period.  Outcome: Adequate for Discharge     Problem: Safety - Adult  Goal: Free from fall injury  Outcome: Adequate for Discharge     Problem: Respiratory - Adult  Goal: Achieves optimal ventilation and oxygenation  Outcome: Adequate for Discharge     Problem: Skin/Tissue Integrity - Adult  Goal: Skin integrity remains intact  Outcome: Adequate for Discharge     Problem: Infection - Adult  Goal: Absence of infection at discharge  Outcome: Adequate for Discharge     Problem: Infection - Adult  Goal: Absence of infection during hospitalization  Outcome: Adequate for Discharge     Problem: Chronic Conditions and Co-morbidities  Goal: Patient's chronic conditions and co-morbidity symptoms are monitored and maintained or improved  Outcome: Adequate for Discharge     Problem: Pain  Goal: Verbalizes/displays adequate comfort level or baseline comfort level  Outcome: Adequate for Discharge

## 2024-03-09 NOTE — CARE COORDINATION
Case Management Assessment            Discharge Note                    Date / Time of Note: 3/9/2024 12:15 PM                  Discharge Note Completed by: ARIC Gongora    Patient Name: Tushar Block   YOB: 1940  Diagnosis: Sepsis ([facility]) [A41.9]  Pneumonia due to infectious organism, unspecified laterality, unspecified part of lung [J18.9]  Urinary tract infection associated with indwelling urethral catheter, initial encounter ([facility]) [T83.511A, N39.0]  Nausea and vomiting, unspecified vomiting type [R11.2]   Date / Time: 2/29/2024  9:50 AM    Current PCP: Mario Alberto Tran patient: No    Hospitalization in the last 30 days: No       Advance Directives:  Code Status: Full Code  [place] DNR form completed and on chart: Not Indicated    Financial:  Payor: HU[employer]NA MEDICARE / Plan: HU[employer] GOLD PLUS HMO / Product Type: *No Product type* /      Pharmacy:    [facility] - [facility] [address] [phone] -  [phone]  [address] [address]  Phone: [phone] Fax: [phone]    [facility] - [address] [phone] -  [phone]  [address] [place] [address]  Phone: [phone] Fax: [phone]      Assistance purchasing medications?: Potential Assistance Purchasing Medications: No  Assistance provided by Case Management: None at this time    Does patient want to participate in local refill/ meds to beds program?: Not Assessed    Meds To Beds General Rules:  1. Can ONLY be done Monday- Friday between 8:30am-5pm  2. Prescription(s) must be in pharmacy by 3pm to be filled same day  3.Copy of patient's insurance/ prescription drug card and patient face sheet must be sent along with the prescription(s)  4. Cost of Rx cannot be added to hospital bill. If financial assistance is needed, please contact unit  or ;  or   CANNOT provide pharmacy voucher for patients co-pays  5. Patients can then  the prescription on their way out of the hospital at discharge, or pharmacy can deliver to the bedside if staff is available. (payment due at time of pick-up or delivery - cash, check, or card accepted)     Able to afford home medications/ co-pay costs: Yes    ADLS:  Current PT AM-PAC Score:   /24  Current OT AM-PAC Score:   /24      DISCHARGE Disposition: Home with Hospice: [facility] to pt's [facility]     LOC at discharge: Not Applicable  AZRA Completed: Yes    Notification completed in HENS/PAS?:  Not Applicable    IMM Completed:   Not Indicated due to: discharge disposition         Transportation:  Transportation PLAN for discharge: EMS transportation   Mode of Transport: Ambulance stretcher - BLS  Reason for medical transport: Bed confined: Meets the following criteria 1) unable to get out of bed without assistance or ambulate, 2) unable to safely sit up in a wheelchair, 3) unable to maintain erect seating position in a chair for time needed for transport  Name of Transport Company: [facility]  Phone: [phone]  Time of Transport: 1600    Transport form completed: Yes    Hospice Services:  Location: Home  Agency: [facility]  Phone: [phone]    Consents signed: Yes    Referrals made at DISCHARGE for outpatient continued care:  Not Applicable    Additional CM Notes: Pt is discharging home to AL at [facility] today with [facility] services. BAYRON spoke with Vicky [phone] with [facility] who reports DME will be delivered before 3:00 pm today. Transportation with [facility] has been scheduled for 1600. Nicolas [phone] with [facility] is aware of planned DME delivery and transport at 1600 and is accepting of the pt with [facility] services. Nicolas has CM direct line number should they have any issues with getting DME delivered. BAYRON spoke

## 2024-03-09 NOTE — PROGRESS NOTES
Podiatric Surgery Daily Progress Note  Tushar Block      Subjective :   Patient seen and examined this am at the bedside.Patient denies any acute overnight events. Patient denies N/V/F/C/SOB. Patient denies calf pain, thigh pain, chest pain.     Review of Systems: A 12 point review of symptoms is unremarkable with the exception of the chief complaint. Patient specifically denies nausea, fever, vomiting, chills, shortness of breath, chest pain, abdominal pain, constipation or difficulty urinating.       Objective     /66   Pulse 81   Temp 98 °F (36.7 °C) (Axillary)   Resp 16   Ht 1.829 m (6')   Wt 95.6 kg (210 lb 12.2 oz)   SpO2 97%   BMI 28.58 kg/m²      I/O:  Intake/Output Summary (Last 24 hours) at 3/9/2024 1048  Last data filed at 3/9/2024 0630  Gross per 24 hour   Intake 1050 ml   Output 2260 ml   Net -1210 ml              Wt Readings from Last 3 Encounters:   03/06/24 95.6 kg (210 lb 12.2 oz)   01/06/24 96.6 kg (212 lb 15.4 oz)   05/25/23 90.7 kg (200 lb)       LABS:    Recent Labs     03/08/24  0751 03/09/24  0305   WBC 6.9 8.0   HGB 9.8* 9.9*   HCT 30.9* 29.5*    251        Recent Labs     03/09/24  0305   *   K 3.7   CL 88*   CO2 25   PHOS 2.4*   BUN 12   CREATININE 0.6*        No results for input(s): \"PROT\", \"INR\", \"APTT\" in the last 72 hours.        LOWER EXTREMITY EXAMINATION    Dressing to b/l LE intact. No strikethrough noted to the external dressing. Scant amount of serosanguinous drainage noted to the internal layers of the dressing.     VASCULAR: DP and PT pulses faintly palpable. Upon handheld doppler examination the DP and PT are noted to be biphasic bilaterally. CFT is brisk to the digits of the foot b/l. Skin temperature is warm to cool from proximal to distal with no focal calor noted. Moderate 1+ pitting edema noted. No pain with calf compression b/l.     NEUROLOGIC: Gross and epicritic sensation is diminished b/l. Protective sensation is diminished at all pedal  suspicion that the bilateral lower extremity is the source of the patient's sepsis.  Crucial to offload patient's heels during this admission with use of either prevalon boots or heels floated off the bed with use of pillows.  Podiatry will continue with local wound care while in house.    Patient's assessment and plan was discussed with PAUL ZapataPJULIA.    Clark Lora DPM   Podiatric Resident PGY1  [employer]  Pager number [phone]  3/9/2024, 10:48 AM

## 2024-03-09 NOTE — PROGRESS NOTES
Received call from [facility] that pt's heart rate dropped to 30 briefly, went into room to check on patient and found him moaning and not responding to name or questions. Eyes were open but patient did not track when trying to get his attention. Rapid response called. Vitals stable at time of unresponsiveness, sinus arrythmia on tele.   Pt began somewhat tracking and saying minimal words, but alertness waxing and waning. Rapid team at bedside.

## 2024-03-11 NOTE — PROCEDURES
INTERPRETATION:  This more than 2-hour, rapid 8-channel EEG recording is abnormal.  It showed mild degree of generalized slowing background activity, maximal left.  No potentially epileptiform activity was present during the recording.       The EEG findings were consistent with mild degree of generalized non-specific cerebral dysfunction, maximal left.     CLASSIFICATION:  Dysrhythmia grade 1, generalized, maximal left.     DESCRIPTION:  BACKGROUND:  The EEG background showed continuous generalized low amplitude intermixed 4 to 11 Hz theta/alpha activity, maximal left.  The EEG showed fair variability and reactivity.       INTERICTAL DISCHARGES: None     SEIZURE BURDEN: 0%

## 2024-03-16 ENCOUNTER — HOSPITAL ENCOUNTER (INPATIENT)
Age: 84
LOS: 5 days | Discharge: NURSING, REHAB OR LONG-TERM CARE | End: 2024-03-21
Attending: EMERGENCY MEDICINE | Admitting: INTERNAL MEDICINE
Payer: MEDICARE

## 2024-03-16 ENCOUNTER — APPOINTMENT (OUTPATIENT)
Dept: CT IMAGING | Age: 84
End: 2024-03-16
Payer: MEDICARE

## 2024-03-16 ENCOUNTER — APPOINTMENT (OUTPATIENT)
Dept: GENERAL RADIOLOGY | Age: 84
End: 2024-03-16
Payer: MEDICARE

## 2024-03-16 DIAGNOSIS — N19 UREMIA: ICD-10-CM

## 2024-03-16 DIAGNOSIS — R41.82 ALTERED MENTAL STATUS, UNSPECIFIED ALTERED MENTAL STATUS TYPE: Primary | ICD-10-CM

## 2024-03-16 DIAGNOSIS — N17.9 AKI (ACUTE KIDNEY INJURY) (HCC): ICD-10-CM

## 2024-03-16 PROBLEM — G93.49 UREMIC ENCEPHALOPATHY: Status: ACTIVE | Noted: 2024-03-16

## 2024-03-16 LAB
ALBUMIN SERPL-MCNC: 2.9 G/DL (ref 3.4–5)
ALP SERPL-CCNC: 106 U/L (ref 40–129)
ALT SERPL-CCNC: <5 U/L (ref 10–40)
AMORPH SED URNS QL MICRO: ABNORMAL /HPF
ANION GAP SERPL CALCULATED.3IONS-SCNC: 11 MMOL/L (ref 3–16)
ANISOCYTOSIS BLD QL SMEAR: ABNORMAL
AST SERPL-CCNC: 9 U/L (ref 15–37)
BACTERIA URNS QL MICRO: ABNORMAL /HPF
BASE EXCESS BLDV CALC-SCNC: 6.3 MMOL/L (ref -2–3)
BASOPHILS # BLD: 0.1 K/UL (ref 0–0.2)
BASOPHILS NFR BLD: 0.7 %
BILIRUB DIRECT SERPL-MCNC: <0.2 MG/DL (ref 0–0.3)
BILIRUB INDIRECT SERPL-MCNC: ABNORMAL MG/DL (ref 0–1)
BILIRUB SERPL-MCNC: 0.3 MG/DL (ref 0–1)
BILIRUB UR QL STRIP.AUTO: NEGATIVE
BUN SERPL-MCNC: 137 MG/DL (ref 7–20)
CALCIUM SERPL-MCNC: 8.4 MG/DL (ref 8.3–10.6)
CHLORIDE SERPL-SCNC: 99 MMOL/L (ref 99–110)
CLARITY UR: CLEAR
CO2 BLDV-SCNC: 35 MMOL/L
CO2 SERPL-SCNC: 29 MMOL/L (ref 21–32)
COHGB MFR BLDV: 1.6 % (ref 0–1.5)
COLOR UR: YELLOW
CREAT SERPL-MCNC: 2.2 MG/DL (ref 0.8–1.3)
DEPRECATED RDW RBC AUTO: 18.7 % (ref 12.4–15.4)
EOSINOPHIL # BLD: 0.1 K/UL (ref 0–0.6)
EOSINOPHIL NFR BLD: 0.4 %
FLUAV RNA RESP QL NAA+PROBE: NOT DETECTED
FLUBV RNA RESP QL NAA+PROBE: NOT DETECTED
GFR SERPLBLD CREATININE-BSD FMLA CKD-EPI: 29 ML/MIN/{1.73_M2}
GLUCOSE SERPL-MCNC: 142 MG/DL (ref 70–99)
GLUCOSE UR STRIP.AUTO-MCNC: NEGATIVE MG/DL
HCO3 BLDV-SCNC: 32.8 MMOL/L (ref 24–28)
HCT VFR BLD AUTO: 30 % (ref 40.5–52.5)
HGB BLD-MCNC: 9.6 G/DL (ref 13.5–17.5)
HGB UR QL STRIP.AUTO: ABNORMAL
KETONES UR STRIP.AUTO-MCNC: NEGATIVE MG/DL
LACTATE BLDV-SCNC: 1 MMOL/L (ref 0.4–1.9)
LACTATE BLDV-SCNC: 1.2 MMOL/L (ref 0.4–1.9)
LEUKOCYTE ESTERASE UR QL STRIP.AUTO: ABNORMAL
LYMPHOCYTES # BLD: 1 K/UL (ref 1–5.1)
LYMPHOCYTES NFR BLD: 6.2 %
MCH RBC QN AUTO: 28.6 PG (ref 26–34)
MCHC RBC AUTO-ENTMCNC: 32.1 G/DL (ref 31–36)
MCV RBC AUTO: 88.9 FL (ref 80–100)
METHGB MFR BLDV: 0.3 % (ref 0–1.5)
MONOCYTES # BLD: 1.3 K/UL (ref 0–1.3)
MONOCYTES NFR BLD: 8.3 %
NEUTROPHILS # BLD: 13.7 K/UL (ref 1.7–7.7)
NEUTROPHILS NFR BLD: 84.4 %
NITRITE UR QL STRIP.AUTO: NEGATIVE
NT-PROBNP SERPL-MCNC: 2070 PG/ML (ref 0–449)
OVALOCYTES BLD QL SMEAR: ABNORMAL
PCO2 BLDV: 57.1 MMHG (ref 41–51)
PH BLDV: 7.37 [PH] (ref 7.35–7.45)
PH UR STRIP.AUTO: 6 [PH] (ref 5–8)
PLATELET # BLD AUTO: 275 K/UL (ref 135–450)
PLATELET BLD QL SMEAR: ADEQUATE
PMV BLD AUTO: 10.1 FL (ref 5–10.5)
PO2 BLDV: 43.3 MMHG (ref 25–40)
POTASSIUM SERPL-SCNC: 4.8 MMOL/L (ref 3.5–5.1)
PROT SERPL-MCNC: 7.7 G/DL (ref 6.4–8.2)
PROT UR STRIP.AUTO-MCNC: 30 MG/DL
RBC # BLD AUTO: 3.37 M/UL (ref 4.2–5.9)
RBC #/AREA URNS HPF: ABNORMAL /HPF (ref 0–4)
SAO2 % BLDV: 77 %
SARS-COV-2 RNA RESP QL NAA+PROBE: NOT DETECTED
SCHISTOCYTES BLD QL SMEAR: ABNORMAL
SODIUM SERPL-SCNC: 139 MMOL/L (ref 136–145)
SP GR UR STRIP.AUTO: 1.02 (ref 1–1.03)
TROPONIN, HIGH SENSITIVITY: 307 NG/L (ref 0–22)
TROPONIN, HIGH SENSITIVITY: 341 NG/L (ref 0–22)
UA COMPLETE W REFLEX CULTURE PNL UR: YES
UA DIPSTICK W REFLEX MICRO PNL UR: YES
URN SPEC COLLECT METH UR: ABNORMAL
UROBILINOGEN UR STRIP-ACNC: 0.2 E.U./DL
WBC # BLD AUTO: 16.2 K/UL (ref 4–11)
WBC #/AREA URNS HPF: ABNORMAL /HPF (ref 0–5)

## 2024-03-16 PROCEDURE — 96361 HYDRATE IV INFUSION ADD-ON: CPT

## 2024-03-16 PROCEDURE — 2580000003 HC RX 258: Performed by: STUDENT IN AN ORGANIZED HEALTH CARE EDUCATION/TRAINING PROGRAM

## 2024-03-16 PROCEDURE — 99285 EMERGENCY DEPT VISIT HI MDM: CPT

## 2024-03-16 PROCEDURE — 71045 X-RAY EXAM CHEST 1 VIEW: CPT

## 2024-03-16 PROCEDURE — 99223 1ST HOSP IP/OBS HIGH 75: CPT | Performed by: INTERNAL MEDICINE

## 2024-03-16 PROCEDURE — 83880 ASSAY OF NATRIURETIC PEPTIDE: CPT

## 2024-03-16 PROCEDURE — 93005 ELECTROCARDIOGRAM TRACING: CPT | Performed by: STUDENT IN AN ORGANIZED HEALTH CARE EDUCATION/TRAINING PROGRAM

## 2024-03-16 PROCEDURE — 93005 ELECTROCARDIOGRAM TRACING: CPT

## 2024-03-16 PROCEDURE — 2580000003 HC RX 258

## 2024-03-16 PROCEDURE — 80048 BASIC METABOLIC PNL TOTAL CA: CPT

## 2024-03-16 PROCEDURE — 71250 CT THORAX DX C-: CPT

## 2024-03-16 PROCEDURE — 6360000002 HC RX W HCPCS: Performed by: STUDENT IN AN ORGANIZED HEALTH CARE EDUCATION/TRAINING PROGRAM

## 2024-03-16 PROCEDURE — 82803 BLOOD GASES ANY COMBINATION: CPT

## 2024-03-16 PROCEDURE — 83605 ASSAY OF LACTIC ACID: CPT

## 2024-03-16 PROCEDURE — 87040 BLOOD CULTURE FOR BACTERIA: CPT

## 2024-03-16 PROCEDURE — 87636 SARSCOV2 & INF A&B AMP PRB: CPT

## 2024-03-16 PROCEDURE — 2000000000 HC ICU R&B

## 2024-03-16 PROCEDURE — 80076 HEPATIC FUNCTION PANEL: CPT

## 2024-03-16 PROCEDURE — 51702 INSERT TEMP BLADDER CATH: CPT

## 2024-03-16 PROCEDURE — 6370000000 HC RX 637 (ALT 250 FOR IP)

## 2024-03-16 PROCEDURE — 84484 ASSAY OF TROPONIN QUANT: CPT

## 2024-03-16 PROCEDURE — 6360000002 HC RX W HCPCS

## 2024-03-16 PROCEDURE — 85025 COMPLETE CBC W/AUTO DIFF WBC: CPT

## 2024-03-16 PROCEDURE — 81001 URINALYSIS AUTO W/SCOPE: CPT

## 2024-03-16 PROCEDURE — 96365 THER/PROPH/DIAG IV INF INIT: CPT

## 2024-03-16 PROCEDURE — 87086 URINE CULTURE/COLONY COUNT: CPT

## 2024-03-16 RX ORDER — TAMSULOSIN HYDROCHLORIDE 0.4 MG/1
0.4 CAPSULE ORAL DAILY
Status: ON HOLD | COMMUNITY
End: 2024-03-21 | Stop reason: HOSPADM

## 2024-03-16 RX ORDER — LINEZOLID 2 MG/ML
600 INJECTION, SOLUTION INTRAVENOUS EVERY 12 HOURS
Status: DISCONTINUED | OUTPATIENT
Start: 2024-03-16 | End: 2024-03-21

## 2024-03-16 RX ORDER — SODIUM CHLORIDE, SODIUM GLUCONATE, SODIUM ACETATE, POTASSIUM CHLORIDE AND MAGNESIUM CHLORIDE 526; 502; 368; 37; 30 MG/100ML; MG/100ML; MG/100ML; MG/100ML; MG/100ML
500 INJECTION, SOLUTION INTRAVENOUS ONCE
Status: DISCONTINUED | OUTPATIENT
Start: 2024-03-16 | End: 2024-03-18

## 2024-03-16 RX ORDER — TAMSULOSIN HYDROCHLORIDE 0.4 MG/1
0.4 CAPSULE ORAL DAILY
Status: DISCONTINUED | OUTPATIENT
Start: 2024-03-17 | End: 2024-03-21

## 2024-03-16 RX ORDER — AZITHROMYCIN 250 MG/1
250 TABLET, FILM COATED ORAL DAILY
Status: CANCELLED | OUTPATIENT
Start: 2024-03-17

## 2024-03-16 RX ORDER — SODIUM CHLORIDE, SODIUM LACTATE, POTASSIUM CHLORIDE, AND CALCIUM CHLORIDE .6; .31; .03; .02 G/100ML; G/100ML; G/100ML; G/100ML
1000 INJECTION, SOLUTION INTRAVENOUS ONCE
Status: COMPLETED | OUTPATIENT
Start: 2024-03-16 | End: 2024-03-16

## 2024-03-16 RX ORDER — SODIUM CHLORIDE, SODIUM GLUCONATE, SODIUM ACETATE, POTASSIUM CHLORIDE AND MAGNESIUM CHLORIDE 526; 502; 368; 37; 30 MG/100ML; MG/100ML; MG/100ML; MG/100ML; MG/100ML
500 INJECTION, SOLUTION INTRAVENOUS ONCE
Status: COMPLETED | OUTPATIENT
Start: 2024-03-16 | End: 2024-03-16

## 2024-03-16 RX ORDER — CHOLECALCIFEROL (VITAMIN D3) 125 MCG
5 CAPSULE ORAL NIGHTLY
COMMUNITY

## 2024-03-16 RX ORDER — TRAMADOL HYDROCHLORIDE 50 MG/1
50 TABLET ORAL 3 TIMES DAILY
COMMUNITY

## 2024-03-16 RX ORDER — ATORVASTATIN CALCIUM 80 MG/1
80 TABLET, FILM COATED ORAL NIGHTLY
COMMUNITY

## 2024-03-16 RX ORDER — ATORVASTATIN CALCIUM 80 MG/1
80 TABLET, FILM COATED ORAL NIGHTLY
Status: DISCONTINUED | OUTPATIENT
Start: 2024-03-17 | End: 2024-03-21 | Stop reason: HOSPADM

## 2024-03-16 RX ORDER — SODIUM CHLORIDE, SODIUM GLUCONATE, SODIUM ACETATE, POTASSIUM CHLORIDE AND MAGNESIUM CHLORIDE 526; 502; 368; 37; 30 MG/100ML; MG/100ML; MG/100ML; MG/100ML; MG/100ML
100 INJECTION, SOLUTION INTRAVENOUS CONTINUOUS
Status: DISCONTINUED | OUTPATIENT
Start: 2024-03-16 | End: 2024-03-19

## 2024-03-16 RX ORDER — FUROSEMIDE 40 MG/1
40 TABLET ORAL EVERY MORNING
COMMUNITY

## 2024-03-16 RX ORDER — TRAMADOL HYDROCHLORIDE 100 MG/1
1 TABLET, COATED ORAL EVERY 6 HOURS PRN
Status: ON HOLD | COMMUNITY
End: 2024-03-21 | Stop reason: HOSPADM

## 2024-03-16 RX ORDER — SODIUM CHLORIDE, SODIUM GLUCONATE, SODIUM ACETATE, POTASSIUM CHLORIDE AND MAGNESIUM CHLORIDE 526; 502; 368; 37; 30 MG/100ML; MG/100ML; MG/100ML; MG/100ML; MG/100ML
100 INJECTION, SOLUTION INTRAVENOUS CONTINUOUS
Status: DISCONTINUED | OUTPATIENT
Start: 2024-03-16 | End: 2024-03-16

## 2024-03-16 RX ADMIN — SODIUM CHLORIDE, POTASSIUM CHLORIDE, SODIUM LACTATE AND CALCIUM CHLORIDE 1000 ML: 600; 310; 30; 20 INJECTION, SOLUTION INTRAVENOUS at 13:55

## 2024-03-16 RX ADMIN — SODIUM CHLORIDE, POTASSIUM CHLORIDE, SODIUM LACTATE AND CALCIUM CHLORIDE 1000 ML: 600; 310; 30; 20 INJECTION, SOLUTION INTRAVENOUS at 15:49

## 2024-03-16 RX ADMIN — SODIUM CHLORIDE, SODIUM GLUCONATE, SODIUM ACETATE, POTASSIUM CHLORIDE AND MAGNESIUM CHLORIDE 100 ML/HR: 526; 502; 368; 37; 30 INJECTION, SOLUTION INTRAVENOUS at 20:23

## 2024-03-16 RX ADMIN — CEFTRIAXONE 1000 MG: 1 INJECTION, POWDER, FOR SOLUTION INTRAMUSCULAR; INTRAVENOUS at 13:57

## 2024-03-16 RX ADMIN — SODIUM CHLORIDE, SODIUM GLUCONATE, SODIUM ACETATE, POTASSIUM CHLORIDE AND MAGNESIUM CHLORIDE 100 ML/HR: 526; 502; 368; 37; 30 INJECTION, SOLUTION INTRAVENOUS at 17:26

## 2024-03-16 RX ADMIN — SODIUM CHLORIDE, SODIUM GLUCONATE, SODIUM ACETATE, POTASSIUM CHLORIDE AND MAGNESIUM CHLORIDE 500 ML: 526; 502; 368; 37; 30 INJECTION, SOLUTION INTRAVENOUS at 17:56

## 2024-03-16 RX ADMIN — LINEZOLID 600 MG: 600 INJECTION, SOLUTION INTRAVENOUS at 20:58

## 2024-03-16 RX ADMIN — Medication 3 ML: at 22:01

## 2024-03-16 RX ADMIN — PIPERACILLIN AND TAZOBACTAM 4500 MG: 4; .5 INJECTION, POWDER, FOR SOLUTION INTRAVENOUS at 20:22

## 2024-03-16 NOTE — ED NOTES
Patients blood pressure has been trending down, last recorded pressure was 87/50, MD notified.         Katheryn Solares, RN  03/16/24 8336

## 2024-03-16 NOTE — CONSULTS
Nephrology note:  Received a message from the emergency room and spoke with the ER physician about this patient  The patient is 83 years old, presented to ER with chief complaints of lethargy not feeling well and fever.    According to ER physician, the patient was extremely dehydrated  Upon arrival, the patient first set of vital signs showed temperature 103 respirations 15 heart rate 92 and blood pressure 103/49.  Patient blood pressure dropped after arrival the lowest blood pressure reading was 44/44.  On routine lab test, the patient was found to have sodium 139 potassium 4.8 bicarb 29  creatinine was 2.2    Though the patient's proBNP was 2070    WBC 16.2 hemoglobin 9.6 and platelet was 275    Chest x-ray showed status post CABG with early congestive heart failure.    The patient will be admitted and according to the ER physician, the patient will be hydrated.    Though based on the information available, there is some inconsistent information.  The patient proBNP is high and chest x-ray showed early signs of congestion.    I request bladder scan

## 2024-03-16 NOTE — ED PROVIDER NOTES
ED Attending Attestation Note     Date of evaluation: 3/16/2024    This patient was seen by the resident.  I have seen and examined the patient, agree with the workup, evaluation, management and diagnosis. The care plan has been discussed.  I have reviewed the ECG and concur with the resident's interpretation.  My assessment reveals an 83-year-old male who presents with fever.  Likely UTI.  Also with significant uremic encephalopathy.     Alex Coughlin MD  03/16/24 0552

## 2024-03-16 NOTE — H&P
Internal Medicine  PGY 1  History & Physical      CC AMS    History Obtained From:  non-family caregiver - AL nurse    HISTORY OF PRESENT ILLNESS:  Mr Block is an 82 yo M with a PMHx of CAD s/p CABG, chronic indwelling schneider catheter, pAfib, GERD who presented with AMS. Per his nurse at [facility] in [place], he began to appear more lethargic and confused yesterday. At baseline, the patient feeds himself, has appropriate conversation, and is AxO x3. However, yesterday he began to have difficulty feeding himself and appeared more confused. When his nurse checked on him today, he was less interactive, found to hypotensive with a SBP in the 80s, and hypoxic with SpO2 of 84% requiring 4L O2 support. The facility transported him here due to the hypoxia.    In the ED, patient was alert and oriented to person, place, and time. BP lows at 87/50. Vitals otherwise WNL. SpO2 100% on 4L O2. BMP significant for BUN of 137, Cr 2.2. Pro BNP elevated to 2,070. Trop elevated at 307, repeat 341. CBC significant for WBC of 16.2, Hgb 9.6.  CXR was read as no acute abnormality, however appears to show possibly developing pneumonia. Patient received 2L of LR, as well as 1L of plasmalyte. Also received 1 dose of ceftriaxone.     Past Medical History:        Diagnosis Date    BPH (benign prostatic hyperplasia)     C. 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

## 2024-03-16 NOTE — ED PROVIDER NOTES
[facility]  EMERGENCY DEPARTMENT ENCOUNTER          EM RESIDENT NOTE       Date of evaluation: 3/16/2024    Chief Complaint     Fever (Lethargic, not feeling well, history of sepsis. )      of Present Illness     Tushar Block is a 83 y.o. male with past medical history as below who presents the emergency department for altered mental status, hypoxia and increased work of breathing.  Patient is currently in a nursing facility.  He is currently alert and orient x 3, unable to tell me why he is here and does not endorse any symptoms, although is a poor historian secondary to his altered mental status.     Per nursing facility: At baseline is alert and orient x 3, talkative, able to feed himself.  Patient has had a decline in mental status yesterday into today, with inability to feed himself, appears more lethargic and slightly confused.  He was noted to be hypoxic, placed on oxygen and subsequently sent here for further evaluation.  Patient is reportedly on hospice, is enrolled in hospice but is currently a full code, his son is power of .      Past Medical, Surgical, Family, and Social History     He has a past medical history of BPH (benign prostatic hyperplasia), C. difficile colitis, Carotid stenosis, Chronic back pain, Encounter for imaging to screen for metal prior to MRI, GERD (gastroesophageal reflux disease), History of atrial fibrillation, Hypertension, Lower GI bleed, MDRO (multiple drug resistant organisms) resistance, Neuromuscular disorder ([facility]), Renal insufficiency, Septic arthritis of interphalangeal joint of toe of right foot (HCC), Systolic congestive heart failure ([facility]), and Vitamin B12 deficiency.  He has a past surgical history that includes back surgery (2006); Foot surgery; Coronary artery bypass graft (12/13/2013); hip surgery (Right, 5/1/2015); Cystoscopy (N/A, 1/10/2019); Upper gastrointestinal endoscopy (N/A, 5/4/2020); sigmoidoscopy (N/A, 6/11/2020); Leg Surgery (Right,

## 2024-03-16 NOTE — ED NOTES
ED TO INPATIENT SBAR HANDOFF    Patient Name: Tushar Block   :  1940  83 y.o.   MRN:  [id number]  ED Room #:  A06/A06-06  Family/Caregiver Present yes   Restraints no   Sitter no   Sepsis Risk Score Sepsis Risk Score: 4.45    Situation  Code Status: Prior No additional code details. *Hospice care? ... This is a bit unclear. Our ER doc talked with family who states that the patient is otherwise capable of making decisions and he seems to be the one wanting full code status. Family is very reasonable and understanding.    Allergies: Bactrim [sulfamethoxazole-trimethoprim]  Weight: Patient Vitals for the past 96 hrs (Last 3 readings):   Weight   24 1059 91.3 kg (201 lb 3.2 oz)     Arrived from: nursing home  Chief Complaint:   Chief Complaint   Patient presents with    Fever     Lethargic, not feeling well, history of sepsis.      Hospital Problem/Diagnosis:  Active Problems:    * No active hospital problems. *  Resolved Problems:    * No resolved hospital problems. *    Imaging:   XR CHEST PORTABLE   Final Result   1. Status post CABG with early congestive heart failure   2. Atelectatic changes right lung base      Electronically signed by MD Nathaniel Rangel        Abnormal labs:   Abnormal Labs Reviewed   CBC WITH AUTO DIFFERENTIAL - Abnormal; Notable for the following components:       Result Value    WBC 16.2 (*)     RBC 3.37 (*)     Hemoglobin 9.6 (*)     Hematocrit 30.0 (*)     RDW 18.7 (*)     Neutrophils Absolute 13.7 (*)     Anisocytosis 1+ (*)     Schistocytes Occasional (*)     Ovalocytes 1+ (*)     All other components within normal limits   BASIC METABOLIC PANEL W/ REFLEX TO MG FOR LOW K - Abnormal; Notable for the following components:    Glucose 142 (*)      (*)     Creatinine 2.2 (*)     Est, Glom Filt Rate 29 (*)     All other components within normal limits    Narrative:     CALL  [facility] tel. [phone],  Chemistry results called to and read back by Mirtha Elizondo

## 2024-03-17 LAB
ALBUMIN SERPL-MCNC: 2.7 G/DL (ref 3.4–5)
ANION GAP SERPL CALCULATED.3IONS-SCNC: 10 MMOL/L (ref 3–16)
BACTERIA UR CULT: NORMAL
BASOPHILS # BLD: 0.1 K/UL (ref 0–0.2)
BASOPHILS NFR BLD: 0.4 %
BUN SERPL-MCNC: 123 MG/DL (ref 7–20)
CALCIUM SERPL-MCNC: 8.3 MG/DL (ref 8.3–10.6)
CHLORIDE SERPL-SCNC: 101 MMOL/L (ref 99–110)
CK SERPL-CCNC: 45 U/L (ref 39–308)
CO2 SERPL-SCNC: 28 MMOL/L (ref 21–32)
CREAT SERPL-MCNC: 1.7 MG/DL (ref 0.8–1.3)
DEPRECATED RDW RBC AUTO: 19 % (ref 12.4–15.4)
EKG ATRIAL RATE: 59 BPM
EKG ATRIAL RATE: 76 BPM
EKG DIAGNOSIS: NORMAL
EKG DIAGNOSIS: NORMAL
EKG P AXIS: 27 DEGREES
EKG P AXIS: 34 DEGREES
EKG P-R INTERVAL: 220 MS
EKG P-R INTERVAL: 234 MS
EKG Q-T INTERVAL: 390 MS
EKG Q-T INTERVAL: 456 MS
EKG QRS DURATION: 94 MS
EKG QRS DURATION: 96 MS
EKG QTC CALCULATION (BAZETT): 427 MS
EKG QTC CALCULATION (BAZETT): 438 MS
EKG R AXIS: 14 DEGREES
EKG R AXIS: 42 DEGREES
EKG T AXIS: 62 DEGREES
EKG T AXIS: 67 DEGREES
EKG VENTRICULAR RATE: 53 BPM
EKG VENTRICULAR RATE: 76 BPM
EOSINOPHIL # BLD: 0.3 K/UL (ref 0–0.6)
EOSINOPHIL NFR BLD: 2 %
FERRITIN SERPL IA-MCNC: 283.7 NG/ML (ref 30–400)
GFR SERPLBLD CREATININE-BSD FMLA CKD-EPI: 40 ML/MIN/{1.73_M2}
GLUCOSE SERPL-MCNC: 127 MG/DL (ref 70–99)
HCT VFR BLD AUTO: 30.7 % (ref 40.5–52.5)
HGB BLD-MCNC: 9.8 G/DL (ref 13.5–17.5)
LYMPHOCYTES # BLD: 1.3 K/UL (ref 1–5.1)
LYMPHOCYTES NFR BLD: 9.5 %
MAGNESIUM SERPL-MCNC: 2.1 MG/DL (ref 1.8–2.4)
MCH RBC QN AUTO: 28.5 PG (ref 26–34)
MCHC RBC AUTO-ENTMCNC: 32.1 G/DL (ref 31–36)
MCV RBC AUTO: 88.7 FL (ref 80–100)
MONOCYTES # BLD: 1 K/UL (ref 0–1.3)
MONOCYTES NFR BLD: 7.2 %
NEUTROPHILS # BLD: 10.9 K/UL (ref 1.7–7.7)
NEUTROPHILS NFR BLD: 80.9 %
PHOSPHATE SERPL-MCNC: 3.5 MG/DL (ref 2.5–4.9)
PLATELET # BLD AUTO: 194 K/UL (ref 135–450)
PMV BLD AUTO: 9.2 FL (ref 5–10.5)
POTASSIUM SERPL-SCNC: 4.2 MMOL/L (ref 3.5–5.1)
RBC # BLD AUTO: 3.46 M/UL (ref 4.2–5.9)
SODIUM SERPL-SCNC: 139 MMOL/L (ref 136–145)
SODIUM UR-SCNC: 56 MMOL/L
WBC # BLD AUTO: 13.4 K/UL (ref 4–11)

## 2024-03-17 PROCEDURE — 93010 ELECTROCARDIOGRAM REPORT: CPT | Performed by: INTERNAL MEDICINE

## 2024-03-17 PROCEDURE — 82550 ASSAY OF CK (CPK): CPT

## 2024-03-17 PROCEDURE — 94761 N-INVAS EAR/PLS OXIMETRY MLT: CPT

## 2024-03-17 PROCEDURE — 83540 ASSAY OF IRON: CPT

## 2024-03-17 PROCEDURE — 80069 RENAL FUNCTION PANEL: CPT

## 2024-03-17 PROCEDURE — 94640 AIRWAY INHALATION TREATMENT: CPT

## 2024-03-17 PROCEDURE — 2500000003 HC RX 250 WO HCPCS

## 2024-03-17 PROCEDURE — 85025 COMPLETE CBC W/AUTO DIFF WBC: CPT

## 2024-03-17 PROCEDURE — 6360000002 HC RX W HCPCS: Performed by: STUDENT IN AN ORGANIZED HEALTH CARE EDUCATION/TRAINING PROGRAM

## 2024-03-17 PROCEDURE — 2060000000 HC ICU INTERMEDIATE R&B

## 2024-03-17 PROCEDURE — 6370000000 HC RX 637 (ALT 250 FOR IP): Performed by: STUDENT IN AN ORGANIZED HEALTH CARE EDUCATION/TRAINING PROGRAM

## 2024-03-17 PROCEDURE — 2580000003 HC RX 258: Performed by: STUDENT IN AN ORGANIZED HEALTH CARE EDUCATION/TRAINING PROGRAM

## 2024-03-17 PROCEDURE — 83550 IRON BINDING TEST: CPT

## 2024-03-17 PROCEDURE — 83735 ASSAY OF MAGNESIUM: CPT

## 2024-03-17 PROCEDURE — 2580000003 HC RX 258

## 2024-03-17 PROCEDURE — 82570 ASSAY OF URINE CREATININE: CPT

## 2024-03-17 PROCEDURE — 2700000000 HC OXYGEN THERAPY PER DAY

## 2024-03-17 PROCEDURE — 82728 ASSAY OF FERRITIN: CPT

## 2024-03-17 PROCEDURE — 6370000000 HC RX 637 (ALT 250 FOR IP)

## 2024-03-17 PROCEDURE — 84300 ASSAY OF URINE SODIUM: CPT

## 2024-03-17 PROCEDURE — 6360000002 HC RX W HCPCS

## 2024-03-17 PROCEDURE — 36415 COLL VENOUS BLD VENIPUNCTURE: CPT

## 2024-03-17 RX ORDER — SODIUM CHLORIDE 0.9 % (FLUSH) 0.9 %
5-40 SYRINGE (ML) INJECTION EVERY 12 HOURS SCHEDULED
Status: DISCONTINUED | OUTPATIENT
Start: 2024-03-17 | End: 2024-03-21 | Stop reason: HOSPADM

## 2024-03-17 RX ORDER — ACETAMINOPHEN 650 MG/1
650 SUPPOSITORY RECTAL EVERY 6 HOURS PRN
Status: DISCONTINUED | OUTPATIENT
Start: 2024-03-17 | End: 2024-03-21 | Stop reason: HOSPADM

## 2024-03-17 RX ORDER — POLYETHYLENE GLYCOL 3350 17 G/17G
17 POWDER, FOR SOLUTION ORAL DAILY PRN
Status: DISCONTINUED | OUTPATIENT
Start: 2024-03-17 | End: 2024-03-19

## 2024-03-17 RX ORDER — ACETAMINOPHEN 325 MG/1
650 TABLET ORAL EVERY 6 HOURS PRN
Status: DISCONTINUED | OUTPATIENT
Start: 2024-03-17 | End: 2024-03-21 | Stop reason: HOSPADM

## 2024-03-17 RX ORDER — LEVOTHYROXINE SODIUM 0.07 MG/1
75 TABLET ORAL DAILY
Status: DISCONTINUED | OUTPATIENT
Start: 2024-03-17 | End: 2024-03-21 | Stop reason: HOSPADM

## 2024-03-17 RX ORDER — SODIUM CHLORIDE 9 MG/ML
INJECTION, SOLUTION INTRAVENOUS PRN
Status: DISCONTINUED | OUTPATIENT
Start: 2024-03-17 | End: 2024-03-21 | Stop reason: HOSPADM

## 2024-03-17 RX ORDER — ONDANSETRON 2 MG/ML
4 INJECTION INTRAMUSCULAR; INTRAVENOUS EVERY 6 HOURS PRN
Status: DISCONTINUED | OUTPATIENT
Start: 2024-03-17 | End: 2024-03-21 | Stop reason: HOSPADM

## 2024-03-17 RX ORDER — ACETAMINOPHEN 325 MG/1
650 TABLET ORAL EVERY 4 HOURS PRN
Status: DISCONTINUED | OUTPATIENT
Start: 2024-03-17 | End: 2024-03-17 | Stop reason: SDUPTHER

## 2024-03-17 RX ORDER — AMLODIPINE BESYLATE 5 MG/1
5 TABLET ORAL DAILY
Status: DISCONTINUED | OUTPATIENT
Start: 2024-03-17 | End: 2024-03-17

## 2024-03-17 RX ORDER — ALBUTEROL SULFATE 2.5 MG/3ML
2.5 SOLUTION RESPIRATORY (INHALATION)
Status: DISCONTINUED | OUTPATIENT
Start: 2024-03-17 | End: 2024-03-17

## 2024-03-17 RX ORDER — ONDANSETRON 4 MG/1
4 TABLET, ORALLY DISINTEGRATING ORAL EVERY 8 HOURS PRN
Status: DISCONTINUED | OUTPATIENT
Start: 2024-03-17 | End: 2024-03-21 | Stop reason: HOSPADM

## 2024-03-17 RX ORDER — SODIUM CHLORIDE 0.9 % (FLUSH) 0.9 %
5-40 SYRINGE (ML) INJECTION PRN
Status: DISCONTINUED | OUTPATIENT
Start: 2024-03-17 | End: 2024-03-21 | Stop reason: HOSPADM

## 2024-03-17 RX ORDER — PANTOPRAZOLE SODIUM 40 MG/1
40 TABLET, DELAYED RELEASE ORAL
Status: DISCONTINUED | OUTPATIENT
Start: 2024-03-17 | End: 2024-03-21 | Stop reason: HOSPADM

## 2024-03-17 RX ORDER — ENOXAPARIN SODIUM 100 MG/ML
30 INJECTION SUBCUTANEOUS DAILY
Status: DISCONTINUED | OUTPATIENT
Start: 2024-03-17 | End: 2024-03-18

## 2024-03-17 RX ORDER — ALBUTEROL SULFATE 2.5 MG/3ML
2.5 SOLUTION RESPIRATORY (INHALATION) EVERY 4 HOURS PRN
Status: DISCONTINUED | OUTPATIENT
Start: 2024-03-17 | End: 2024-03-21 | Stop reason: HOSPADM

## 2024-03-17 RX ADMIN — MICONAZOLE NITRATE: 20 POWDER TOPICAL at 20:41

## 2024-03-17 RX ADMIN — PIPERACILLIN AND TAZOBACTAM 3375 MG: 3; .375 INJECTION, POWDER, FOR SOLUTION INTRAVENOUS; PARENTERAL at 20:41

## 2024-03-17 RX ADMIN — SODIUM CHLORIDE, PRESERVATIVE FREE 10 ML: 5 INJECTION INTRAVENOUS at 09:02

## 2024-03-17 RX ADMIN — LEVOTHYROXINE SODIUM 75 MCG: 75 TABLET ORAL at 06:24

## 2024-03-17 RX ADMIN — ACETAMINOPHEN 650 MG: 325 TABLET ORAL at 10:57

## 2024-03-17 RX ADMIN — ACETAMINOPHEN 650 MG: 325 TABLET ORAL at 17:06

## 2024-03-17 RX ADMIN — SODIUM CHLORIDE, SODIUM GLUCONATE, SODIUM ACETATE, POTASSIUM CHLORIDE AND MAGNESIUM CHLORIDE 100 ML/HR: 526; 502; 368; 37; 30 INJECTION, SOLUTION INTRAVENOUS at 13:24

## 2024-03-17 RX ADMIN — TAMSULOSIN HYDROCHLORIDE 0.4 MG: 0.4 CAPSULE ORAL at 08:35

## 2024-03-17 RX ADMIN — ENOXAPARIN SODIUM 30 MG: 100 INJECTION SUBCUTANEOUS at 08:35

## 2024-03-17 RX ADMIN — LINEZOLID 600 MG: 600 INJECTION, SOLUTION INTRAVENOUS at 08:29

## 2024-03-17 RX ADMIN — PIPERACILLIN AND TAZOBACTAM 3375 MG: 3; .375 INJECTION, POWDER, FOR SOLUTION INTRAVENOUS; PARENTERAL at 11:34

## 2024-03-17 RX ADMIN — MICONAZOLE NITRATE: 20 POWDER TOPICAL at 00:52

## 2024-03-17 RX ADMIN — PANTOPRAZOLE SODIUM 40 MG: 40 TABLET, DELAYED RELEASE ORAL at 06:24

## 2024-03-17 RX ADMIN — ATORVASTATIN CALCIUM 80 MG: 80 TABLET, FILM COATED ORAL at 19:28

## 2024-03-17 RX ADMIN — PIPERACILLIN AND TAZOBACTAM 3375 MG: 3; .375 INJECTION, POWDER, FOR SOLUTION INTRAVENOUS; PARENTERAL at 03:31

## 2024-03-17 RX ADMIN — SODIUM CHLORIDE, SODIUM GLUCONATE, SODIUM ACETATE, POTASSIUM CHLORIDE AND MAGNESIUM CHLORIDE 100 ML/HR: 526; 502; 368; 37; 30 INJECTION, SOLUTION INTRAVENOUS at 22:56

## 2024-03-17 RX ADMIN — LINEZOLID 600 MG: 600 INJECTION, SOLUTION INTRAVENOUS at 19:27

## 2024-03-17 RX ADMIN — MICONAZOLE NITRATE: 20 POWDER TOPICAL at 08:35

## 2024-03-17 RX ADMIN — ALBUTEROL SULFATE 2.5 MG: 2.5 SOLUTION RESPIRATORY (INHALATION) at 08:11

## 2024-03-17 ASSESSMENT — PAIN SCALES - WONG BAKER
WONGBAKER_NUMERICALRESPONSE: NO HURT

## 2024-03-17 ASSESSMENT — PAIN SCALES - GENERAL
PAINLEVEL_OUTOF10: 5
PAINLEVEL_OUTOF10: 0
PAINLEVEL_OUTOF10: 0
PAINLEVEL_OUTOF10: 2
PAINLEVEL_OUTOF10: 9
PAINLEVEL_OUTOF10: 0
PAINLEVEL_OUTOF10: 5

## 2024-03-17 ASSESSMENT — PAIN DESCRIPTION - ORIENTATION
ORIENTATION: MID
ORIENTATION: RIGHT;LEFT

## 2024-03-17 ASSESSMENT — PAIN DESCRIPTION - LOCATION
LOCATION: FOOT
LOCATION: BACK

## 2024-03-17 ASSESSMENT — PAIN DESCRIPTION - DESCRIPTORS
DESCRIPTORS: DISCOMFORT
DESCRIPTORS: DISCOMFORT

## 2024-03-17 NOTE — PROGRESS NOTES
4 Eyes Skin Assessment     NAME:  Tushar Block  YOB: 1940  MEDICAL RECORD NUMBER:  [id number]    The patient is being assessed for  Admission    I agree that at least one RN has performed a thorough Head to Toe Skin Assessment on the patient. ALL assessment sites listed below have been assessed.      Areas assessed by both nurses:    Head, Face, Ears, Shoulders, Back, Chest, Arms, Elbows, Hands, Sacrum. Buttock, Coccyx, Ischium, Legs. Feet and Heels, Under Medical Devices , and Other yes        Does the Patient have a Wound? Yes wound(s) were present on assessment. LDA wound assessment was Initiated and completed by RN       Terrance Prevention initiated by RN: Yes  Wound Care Orders initiated by RN: Yes    Pressure Injury (Stage 3,4, Unstageable, DTI, NWPT, and Complex wounds) if present, place Wound referral order by RN under : Yes  Sacrum  RASHIDA heels  Mid back     New Ostomies, if present place, Ostomy referral order under : No     Nurse 1 eSignature: Electronically signed by Pallavi Danielle RN on 3/17/24 at 1:32 AM EDT    **SHARE this note so that the co-signing nurse can place an eSignature**    Nurse 2 eSignature: Electronically signed by Annika Ramírez RN on 3/17/24 at 7:44 AM EDT

## 2024-03-17 NOTE — PROGRESS NOTES
Clinical Pharmacy Consult Note  Medication History     Admit Date: 3/16/2024    List of of current medications patient is taking is complete. Home Medication list in EPIC updated to reflect changes noted below.    Source of information: Assisted Living (AL) Paperwork from [facility] (printed 03/16/2024 1007)    Patient's home pharmacy: [place], [place] ([phone])    Changes made to medication list:   Medications added:   Atorvastatin 80 mg Tablets  Furosemide 40 mg Tablets  Melatonin 5 mg Tablets  Tamsulosin 0.4 mg Capsules  Tramadol 50 mg Tablets - 1 tablet PO TID  Tramadol 100 mg Tablets - 1 tablet PO Q6h PRN mild to moderate pain  Medication doses adjusted:   Guaifenesin 600 mg Tablets changed to \"BID\" instead of \"BID PRN\" - per AL papers  Other notes:   AL paperwork states that Levothyroxine 75 mcg Tablets are given in the AFTERNOON  AL paperwork states that the patient takes Boost Dietary Supplement BID (ordered 11/02/2022)  All PRN medications were given a last dose of \"Past Week\"    Current Outpatient Medications   Medication Instructions    acetaminophen (TYLENOL) 650 mg, Oral, EVERY 4 HOURS PRN    albuterol sulfate HFA (PROVENTIL HFA) 108 (90 Base) MCG/ACT inhaler 2 puffs, Inhalation, EVERY 6 HOURS PRN    amLODIPine (NORVASC) 5 mg, Oral, DAILY    atorvastatin (LIPITOR) 80 mg, Oral, Nightly    bisacodyl (DULCOLAX) 10 mg, Rectal, DAILY PRN    calcium carbonate (TUMS) 500 MG chewable tablet 1 tablet, Oral, 3 TIMES DAILY PRN    dicyclomine (BENTYL) 20 mg, Oral, 3 TIMES DAILY PRN    furosemide (LASIX) 40 mg, Oral, EVERY MORNING    gabapentin (NEURONTIN) 600 mg, Oral, 2 TIMES DAILY    guaiFENesin (MUCINEX) 600 mg, Oral, 2 TIMES DAILY PRN    levothyroxine (SYNTHROID) 75 mcg, Oral, DAILY    loperamide (IMODIUM) 2 mg, Oral, 4 TIMES DAILY PRN    loratadine (CLARITIN) 10 mg, Oral, DAILY    melatonin 5 mg, Oral, NIGHTLY    menthol-zinc oxide (CALMOSEPTINE) 0.44-20.625 % OINT ointment Apply

## 2024-03-17 NOTE — PLAN OF CARE
Problem: Discharge Planning  Goal: Discharge to home or other facility with appropriate resources  3/17/2024 1949 by Destiny Chung, RN  Outcome: Progressing     Problem: Skin/Tissue Integrity  Goal: Absence of new skin breakdown  Description: 1.  Monitor for areas of redness and/or skin breakdown  2.  Assess vascular access sites hourly  3.  Every 4-6 hours minimum:  Change oxygen saturation probe site  4.  Every 4-6 hours:  If on nasal continuous positive airway pressure, respiratory therapy assess nares and determine need for appliance change or resting period.  3/17/2024 1949 by Destiny Chung, RN  Outcome: Progressing     Problem: Safety - Adult  Goal: Free from fall injury  3/17/2024 1949 by Destiny Chung RN  Outcome: Progressing  Flowsheets (Taken 3/17/2024 1948)  Free From Fall Injury: Instruct family/caregiver on patient safety     Problem: Pain  Goal: Verbalizes/displays adequate comfort level or baseline comfort level  3/17/2024 1949 by Destiny Chung RN  Outcome: Progressing     Problem: Neurosensory - Adult  Goal: Achieves stable or improved neurological status  3/17/2024 1949 by Destiny Chung RN  Outcome: Progressing     Problem: Neurosensory - Adult  Goal: Absence of seizures  3/17/2024 1949 by Destiny Chung RN  Outcome: Progressing     Problem: Neurosensory - Adult  Goal: Remains free of injury related to seizures activity  3/17/2024 1949 by Destiny Chung RN  Outcome: Progressing     Problem: Neurosensory - Adult  Goal: Achieves maximal functionality and self care  3/17/2024 1949 by Destiny Chung, RN  Outcome: Progressing     Problem: Respiratory - Adult  Goal: Achieves optimal ventilation and oxygenation  3/17/2024 1949 by Destiny Chung, RN  Outcome: Progressing     Problem: Cardiovascular - Adult  Goal: Maintains optimal cardiac output and hemodynamic stability  3/17/2024 1949 by Destiny Chung RN  Outcome: Progressing     Problem: Cardiovascular -  remains patent  3/17/2024 1949 by Destiny Chung RN  Outcome: Progressing     Problem: Infection - Adult  Goal: Absence of infection at discharge  3/17/2024 1949 by Destiny Chung RN  Outcome: Progressing     Problem: Metabolic/Fluid and Electrolytes - Adult  Goal: Electrolytes maintained within normal limits  3/17/2024 1949 by Destiny Chung RN  Outcome: Progressing     Problem: Metabolic/Fluid and Electrolytes - Adult  Goal: Hemodynamic stability and optimal renal function maintained  3/17/2024 1949 by Destiny Chung RN  Outcome: Progressing     Problem: Chronic Conditions and Co-morbidities  Goal: Patient's chronic conditions and co-morbidity symptoms are monitored and maintained or improved  Outcome: Progressing  Flowsheets (Taken 3/17/2024 0800 by Susanna Welch, RN)  Care Plan - Patient's Chronic Conditions and Co-Morbidity Symptoms are Monitored and Maintained or Improved:   Monitor and assess patient's chronic conditions and comorbid symptoms for stability, deterioration, or improvement   Collaborate with multidisciplinary team to address chronic and comorbid conditions and prevent exacerbation or deterioration   Update acute care plan with appropriate goals if chronic or comorbid symptoms are exacerbated and prevent overall improvement and discharge

## 2024-03-17 NOTE — CONSULTS
ICU Consult Note    Admit Date: 3/16/2024  Hospital Day: 0  ICU Day: Patient does not have an ICU stay during this admission.  Vent Day: N/A  IV Access: Peripheral  IV Fluids: None  Vasopressors: Levophed  Antibiotics: Zosyn and vancomycin  Diet: No diet orders on file  PCP: Mario Alberto Tran  Code Status: Prior    CC: Fever (Lethargic, not feeling well, history of sepsis. )      Interval history:  See HPI.    HPI:  Mr Block is an 82 yo M with a PMHx of CAD s/p CABG, chronic indwelling schneider catheter, pAfib, GERD, and muscle spasticity w/ intrathecal Baclofen pump who presented with AMS. Per his nurse at [facility] in [place], he began to appear more lethargic and confused yesterday. At baseline, the patient feeds himself, has appropriate conversation, and is AxO x3. However, yesterday he began to have difficulty feeding himself and appeared more confused. When his nurse checked on him today, he was less interactive, found to hypotensive with a SBP in the 80s, and hypoxic with SpO2 of 84% requiring 4L O2 support. The facility transported him here due to the hypoxia.     In the ED, patient was alert and oriented to person, place, and time. BP lows at 87/50. Vitals otherwise WNL. SpO2 100% on 4L O2. BMP significant for BUN of 137, Cr 2.2. Pro BNP elevated to 2,070. Trop elevated at 307, repeat 341. CBC significant for WBC of 16.2, Hgb 9.6.  CXR was read as no acute abnormality, however appears to show possibly developing pneumonia. Patient received 2L of LR, as well as 1L of plasmalyte. Also received 1 dose of ceftriaxone.    MAPs labile between 60 and 70 after 3 L IVF. Ordered Levo in ED.    Medications:     Scheduled Meds:    electrolyte-A  500 mL IntraVENous Once    linezolid  600 mg IntraVENous Q12H    piperacillin-tazobactam  4,500 mg IntraVENous Once    Followed by    [START ON 3/17/2024] piperacillin-tazobactam  3,375 mg IntraVENous Q8H    Lido-EPINEPHrine-Tetracaine   Topical Once     Continuous  combined degeneration  Has indwelling intrathecal Baclofen pump since 2015.  - No acute action at this time    #H/o obtunded mental status  Currently AAOx4. Responding appropriately after occasional repeated prompting.  - EEG performed during previous hospitalization showed generalized slowing background activity, no epileptiform activity  - Monitor for signs of mental status decline    Pulmonary  No ongoing problems.    Cardiovascular  #CAD s/p CABG  #PAF  #HTN  #HLD  - Home Lipitor 80 mg daily  - ASA 81  - Hold home antihypertensives    FEN/GI  Diet: No diet orders on file  GI ppx: None indicated    Renal   #Acute kidney injury  Baseline creatinine 0.7-1.0. Creatinine on admission 2.2. . Suspect prerenal etiology v intrarenal since patient has indwelling catheter.  - IVF  - Kanu and UCr      #Neurogenic bladder w/ indwelling urinary catheter  - Iglesias catheter routine care    Heme/Onc  No ongoing problems.    ID  #UTI w/ sepsis  Recently hospitalized for UTI which grew pansensitive Proteus mirabilis. Treated w/ 7 days meropenem and vancomycin. 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 Previous cultures have grown MDR Marganella and Enterococcus.  - Urine culture  - Meropenem and vancomycin  - Likely will require longer treatment course    Endo/Rheum  #Hypothyroidism  - Home Synthroid    Derm/MSK  #Balanitis  - BID saline wash  - Miconazole powder  - Keep area dry    Code Status: Prior   DVT PPx: Lovenox  DISPO: ICU    Hurricane Bill Pringle, DO  Internal Medicine, PGY-1  Contact via [email or web address]  3/16/2024, 8:45 PM    This patient has been staffed and discussed with Dr. Jaycob Weinstein.    Patient examined, findings as discussed with Dr. Pringle.  Agree with assessment and plan.  Recurrent urinary tract infection, treating with broad-spectrum antibiotics.  Blood pressure has been on the low end of normal range but has responded to fluids, and has not required vasopressor

## 2024-03-17 NOTE — PROGRESS NOTES
normal without biliary ductal dilatation. The gallbladder contains gallstones without evidence of wall thickening or distention. Noncontrast images of the spleen, pancreas, and adrenal glands appear normal. There is a 6 mm nonobstructing left renal calculus. There is no hydronephrosis or hydroureter. The urinary bladder is nondistended with a Iglesias catheter and demonstrates moderate wall thickening. The prostate gland is mildly enlarged The stomach is normal. Small bowel loops appear normal. There is moderate symmetric wall thickening along the rectum with surrounding stranding which may represent proctitis. The appendix appears normal. No free intraperitoneal air is seen. No lymphadenopathy is seen. The abdominal aorta is moderately atherosclerotic and normal in caliber. A right lower quadrant ventral stimulator device is present. Bone windows demonstrate no suspicious lytic or blastic lesions. Right femoral neck fixation is noted.     CHEST: 1.  Noncontrast CT with 8 x 4 cm multilocular left lower lobe abscess versus empyema. 2.  Extensive bilateral dependent consolidation and volume loss which may represent a combination of atelectasis and pneumonia. 3.  Mild mediastinal lymphadenopathy is nonspecific but may be reactive. ABDOMEN/PELVIS: 1.  Noncontrast CT demonstrating moderate proctitis and cystitis. 2.  Left nephrolithiasis. 3.  Cholelithiasis.     XR CHEST PORTABLE    Result Date: 3/16/2024  Reason: Shortness of breath AP chest FINDINGS: Comparison made to an AP chest March 8, 2024. Median sternotomy wires redemonstrated. Pulmonary vascularity is moderately congested likely septal lines both lower lungs. Linear banding right lower lung.     1. Status post CABG with early congestive heart failure 2. Atelectatic changes right lung base Electronically signed by MD Nathaniel Rangel      CBC:   Recent Labs     03/16/24  1157 03/17/24  0442   WBC 16.2* 13.4*   HGB 9.6* 9.8*    194     BMP:    Recent Labs      03/17/24  0442    139   K 4.8 4.2   CL 99 101   CO2 29 28   * 123*   CREATININE 2.2* 1.7*   GLUCOSE 142* 127*     Hepatic:   Recent Labs     03/16/24  1157   AST 9*   ALT <5*   BILITOT 0.3   ALKPHOS 106     Lipids:   Lab Results   Component Value Date/Time    CHOL 102 06/05/2021 12:45 PM    HDL 43 06/05/2021 12:45 PM    TRIG 54 06/05/2021 12:45 PM     Hemoglobin A1C:   Lab Results   Component Value Date/Time    LABA1C 5.3 03/01/2024 12:15 PM     TSH:   Lab Results   Component Value Date/Time    TSH 3.05 06/05/2021 12:45 PM     Troponin: No results found for: \"TROPONINT\"  Lactic Acid: No results for input(s): \"LACTA\" in the last 72 hours.  BNP:   Recent Labs     03/16/24  1157   PROBNP 2,070*     UA:  Lab Results   Component Value Date/Time    NITRU Negative 03/16/2024 02:00 PM    COLORU Yellow 03/16/2024 02:00 PM    PHUR 6.0 03/16/2024 02:00 PM    LABCAST 0-1 Hyaline 12/16/2013 05:20 PM    WBCUA  03/16/2024 02:00 PM    RBCUA 3-4 03/16/2024 02:00 PM    MUCUS 1+ 01/26/2022 10:01 PM    YEAST Present 01/17/2022 05:25 PM    BACTERIA 1+ 03/16/2024 02:00 PM    CLARITYU Clear 03/16/2024 02:00 PM    SPECGRAV 1.020 03/16/2024 02:00 PM    LEUKOCYTESUR LARGE 03/16/2024 02:00 PM    UROBILINOGEN 0.2 03/16/2024 02:00 PM    BILIRUBINUR Negative 03/16/2024 02:00 PM    BLOODU MODERATE 03/16/2024 02:00 PM    GLUCOSEU Negative 03/16/2024 02:00 PM    KETUA Negative 03/16/2024 02:00 PM    AMORPHOUS 1+ 03/16/2024 02:00 PM     Urine Cultures:   Lab Results   Component Value Date/Time    LABURIN No growth at 18 to 36 hours 03/16/2024 02:00 PM     Blood Cultures:   Lab Results   Component Value Date/Time    BC No Growth after 4 days of incubation. 02/29/2024 10:50 AM     Lab Results   Component Value Date/Time    BLOODCULT2 No Growth after 4 days of incubation. 02/29/2024 10:50 AM     Organism:   Lab Results   Component Value Date/Time    ORG Proteus mirabilis 02/29/2024 10:50 AM         Electronically signed by Fidel SANTANA

## 2024-03-17 NOTE — ED NOTES
ICU residents at bedside. They were made aware of the fluctuation of blood pressure.      Katheryn Solares, RN  03/16/24 2012

## 2024-03-17 NOTE — PROGRESS NOTES
-[facility]-  -ICU to [facility] Transfer Note-  HPI from H&P  Mr Block is an 82 yo M with a PMHx of CAD s/p CABG, chronic indwelling schneider catheter, pAfib, GERD, and muscle spasticity w/ intrathecal Baclofen pump who presented with AMS. Per his nurse at [facility] in [place], he began to appear more lethargic and confused yesterday. At baseline, the patient feeds himself, has appropriate conversation, and is AxO x3. However, yesterday he began to have difficulty feeding himself and appeared more confused. When his nurse checked on him today, he was less interactive, found to hypotensive with a SBP in the 80s, and hypoxic with SpO2 of 84% requiring 4L O2 support. The facility transported him here due to the hypoxia.     In the ED, patient was alert and oriented to person, place, and time. BP lows at 87/50. Vitals otherwise WNL. SpO2 100% on 4L O2. BMP significant for BUN of 137, Cr 2.2. Pro BNP elevated to 2,070. Trop elevated at 307, repeat 341. CBC significant for WBC of 16.2, Hgb 9.6.  CXR was read as no acute abnormality, however appears to show possibly developing pneumonia. Patient received 2L of LR, as well as 1L of plasmalyte. Also received 1 dose of ceftriaxone.     MAPs labile between 60 and 70 after 3 L IVF. Ordered Levo in ED    I  ICU Admission Reason & Brief ICU Course:   Patient's was admitted to ICU due to levophed drip. However upon arrival he did not require any levophed. His blood pressure sometimes appears low when asleep around 80/50s but when rechecked it is around the 130s/40s.        C  Code Status/DPOA Info/Goals of Care/ACP Note:   -Code Status Full  -POA/ACP documentation?: no  -Any changes to goals of care? no  -is palliative care consulted? no      U Unprescribing & Pertinent High-Risk Medications  -Anticoagulation:  VTE Prophylaxis - lovenox dose   -[ ] VTE Prophylaxis - lovenox dose    -Antibiotics:  zosyn   -[] GI PPX : no    P Pending Tests at the Time of  Transfer  Urine studies         A Active consultants, including Rehab:  Nephrology      U Uncertainty Measure/Diagnostic Pause:   Working diagnosis at the time of transfer Sepsis 2/2 UTI   Select from the followin: High degree of certainty about the clinical diagnosis.        S Summary of Major Problems and To-Dos:  #UTI w/ sepsis  Recently hospitalized for UTI which grew pansensitive Proteus mirabilis. Treated w/ 7 days meropenem and vancomycin. 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 Previous cultures have grown MDR Marganella and Enterococcus.  - Urine culture  - zosyn and zyvox (3/17)  - Likely will require longer treatment course     #Acute kidney injury  Baseline creatinine 0.7-1.0. Creatinine on admission 2.2. . Suspect prerenal etiology v intrarenal since patient has indwelling catheter.  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  - No acute action at this time     #H/o obtunded mental status  Currently AAOx4. Responding appropriately after occasional repeated prompting.  - EEG performed during previous hospitalization showed generalized slowing background activity, no epileptiform activity  - Monitor for signs of mental status decline    #Hypothyroidism  - Home Synthroid

## 2024-03-17 NOTE — PLAN OF CARE
Spoke with patient's son, Jourdan Block, who is HPOA.  Inquired what CODE STATUS and goals of care are for patient.  Jourdan states that patient currently and adamantly wants full code despite previous hospice status.  Explained to son that patient may require vasopressors, mandating central line placement and stay in ICU.  Patient's son expressed understanding.  All other questions were addressed and answered.    Reginaldo Pringle,   Internal Medicine, PGY-1  3/16/2024, 8:19 PM

## 2024-03-17 NOTE — CONSULTS
Patient ID: Tushar Block  Referring/ Physician: Christian Jay, *      Summary:   Tushar Block is being seen by nephrology for ALIS    Reason for admission:   Altered mental status    Interval Hx:   The patient was seen and examined in ICU with the patient nurse at his bedside  The patient is hard of hearing  The patient appears to be dry  His mouth mucosa very dry  The patient denies of any new discomfort  The patient getting Plasma-Lyte 8 infusion  The patient most recent set of vital signs showed temperature 97 heart rate 89 and a blood pressure 154/66 pulse ox 97%  Lab work from this morning showed sodium 139 potassium 4.2 bicarb 28  creatinine 1.7 magnesium 2.1 glucose 127 calcium 8.3 phosphorus 3.5 albumin 2.7    WBC 13.4 hemoglobin 9.8 and the platelet 194    Assessment/Plan:   ALIS:  The patient presenting creatinine was 2.2  1 week earlier the patient's creatinine was 0.6  The patient's serum sodium level has also increased drastically from 126-139 in 7 days  The patient's BUN was 137 which is up from 12.  Therefore, dehydration is probably the underlying cause.    What is on your in his case is because his proBNP is high and chest x-ray showed early congestive heart failure.    The patient's BUN elevation is also very disproportionate to the patient's creatinine rising which could be consistent with dehydration but the same time it could also be consistent with him slow upper GI bleeding.    The patient also had ultrasound kidney done yesterday but it is still not released the results.    The patient had CT scan of his chest which showed 8 x 4 cm multilocular left lower lobe abscess versus empyema    On the left kidney there was a 6 mm nonobstructing kidney stones, no hydronephrosis or hydroureter.  The patient's urinary bladder was not distended.    Extensive bilateral dependent consolidation and volume loss which may represent a combination of atelectasis and    Intake 610.05 ml   Output 800 ml   Net -189.95 ml         General appearance: in no acute distress.   HEENT:   Respiratory: Respiratory effort normal, bilateral equal chest rise. No wheeze, no crackles no  Cardiovascular: Ausculation shows RRR and no edema   Abdomen: abdomen is soft, non distended  Musculoskeletal:  no joint swelling, no deformity      Data:   CBC:   Recent Labs     03/16/24  1157 03/17/24  0442   WBC 16.2* 13.4*   HGB 9.6* 9.8*   HCT 30.0* 30.7*    194     BMP:    Recent Labs     03/16/24  1157 03/17/24  0442    139   K 4.8 4.2   CL 99 101   CO2 29 28   * 123*   CREATININE 2.2* 1.7*   GLUCOSE 142* 127*   MG  --  2.10   PHOS  --  3.5     Lab Results   Component Value Date/Time    COLORU Yellow 03/16/2024 02:00 PM    NITRU Negative 03/16/2024 02:00 PM    GLUCOSEU Negative 03/16/2024 02:00 PM    KETUA Negative 03/16/2024 02:00 PM    UROBILINOGEN 0.2 03/16/2024 02:00 PM    BILIRUBINUR Negative 03/16/2024 02:00 PM

## 2024-03-17 NOTE — PLAN OF CARE
Problem: Discharge Planning  Goal: Discharge to home or other facility with appropriate resources  Outcome: Progressing  Flowsheets (Taken 3/17/2024 0000)  Discharge to home or other facility with appropriate resources:   Identify barriers to discharge with patient and caregiver   Arrange for needed discharge resources and transportation as appropriate   Identify discharge learning needs (meds, wound care, etc)   Arrange for interpreters to assist at discharge as needed   Refer to discharge planning if patient needs post-hospital services based on physician order or complex needs related to functional status, cognitive ability or social support system     Problem: Skin/Tissue Integrity  Goal: Absence of new skin breakdown  Description: 1.  Monitor for areas of redness and/or skin breakdown  2.  Assess vascular access sites hourly  3.  Every 4-6 hours minimum:  Change oxygen saturation probe site  4.  Every 4-6 hours:  If on nasal continuous positive airway pressure, respiratory therapy assess nares and determine need for appliance change or resting period.  Outcome: Progressing     Problem: Safety - Adult  Goal: Free from fall injury  Outcome: Progressing

## 2024-03-17 NOTE — PLAN OF CARE
have fall risk factors  3/17/2024 0134 by Pallavi Danielle RN  Outcome: Progressing     Problem: Pain  Goal: Verbalizes/displays adequate comfort level or baseline comfort level  Outcome: Progressing  Flowsheets  Taken 3/17/2024 0800 by Susanna Welch RN  Verbalizes/displays adequate comfort level or baseline comfort level:   Encourage patient to monitor pain and request assistance   Assess pain using appropriate pain scale   Administer analgesics based on type and severity of pain and evaluate response  Taken 3/17/2024 0000 by Pallavi Danielle RN  Verbalizes/displays adequate comfort level or baseline comfort level:   Encourage patient to monitor pain and request assistance   Assess pain using appropriate pain scale   Administer analgesics based on type and severity of pain and evaluate response   Implement non-pharmacological measures as appropriate and evaluate response   Consider cultural and social influences on pain and pain management   Notify Licensed Independent Practitioner if interventions unsuccessful or patient reports new pain     Problem: Neurosensory - Adult  Goal: Achieves stable or improved neurological status  Outcome: Progressing  Goal: Absence of seizures  Outcome: Progressing  Goal: Remains free of injury related to seizures activity  Outcome: Progressing  Goal: Achieves maximal functionality and self care  Outcome: Progressing     Problem: Respiratory - Adult  Goal: Achieves optimal ventilation and oxygenation  Outcome: Progressing  Flowsheets  Taken 3/17/2024 0800 by Susanna Welch RN  Achieves optimal ventilation and oxygenation:   Assess for changes in respiratory status   Assess for changes in mentation and behavior   Position to facilitate oxygenation and minimize respiratory effort  Taken 3/17/2024 0000 by Pallavi Danielle RN  Achieves optimal ventilation and oxygenation:   Assess for changes in respiratory status   Assess for changes in mentation and behavior   Position to  Incisions, wounds, or drain sites healing without S/S of infection  Outcome: Progressing  Flowsheets (Taken 3/17/2024 1050)  Incisions, Wounds, or Drain Sites Healing Without Sign and Symptoms of Infection:   ADMISSION and DAILY: Assess and document risk factors for pressure ulcer development   TWICE DAILY: Assess and document skin integrity   TWICE DAILY: Assess and document dressing/incision, wound bed, drain sites and surrounding tissue  Goal: Oral mucous membranes remain intact  Outcome: Progressing  Flowsheets (Taken 3/17/2024 1050)  Oral Mucous Membranes Remain Intact:   Assess oral mucosa and hygiene practices   Implement preventative oral hygiene regimen   Implement oral medicated treatments as ordered     Problem: Musculoskeletal - Adult  Goal: Return mobility to safest level of function  Outcome: Progressing  Goal: Maintain proper alignment of affected body part  Outcome: Progressing  Goal: Return ADL status to a safe level of function  Outcome: Progressing     Problem: Genitourinary - Adult  Goal: Absence of urinary retention  Outcome: Progressing  Goal: Urinary catheter remains patent  Outcome: Progressing     Problem: Infection - Adult  Goal: Absence of infection at discharge  Outcome: Progressing  Flowsheets  Taken 3/17/2024 0800 by Susanna Welch RN  Absence of infection at discharge:   Assess and monitor for signs and symptoms of infection   Monitor lab/diagnostic results   Monitor all insertion sites i.e., indwelling lines, tubes and drains  Taken 3/17/2024 0125 by Pallavi Danielle RN  Absence of infection at discharge:   Assess and monitor for signs and symptoms of infection   Monitor lab/diagnostic results   Monitor all insertion sites i.e., indwelling lines, tubes and drains   Monitor endotracheal (as able) and nasal secretions for changes in amount and color   [place] appropriate cooling/warming therapies per order   Instruct and encourage patient and family to use good hand hygiene

## 2024-03-17 NOTE — RT PROTOCOL NOTE
RT Nebulizer Bronchodilator Protocol Note    There is a bronchodilator order in the chart from a provider indicating to follow the RT Bronchodilator Protocol and there is an “Initiate RT Bronchodilator Protocol” order as well (see protocol at bottom of note).    CXR Findings:  XR CHEST PORTABLE    Result Date: 3/16/2024  1. Status post CABG with early congestive heart failure 2. Atelectatic changes right lung base Electronically signed by MD Nathaniel Rangel      The findings from the last RT Protocol Assessment were as follows:  Smoking: None or smoker <15 pack years  Respiratory Pattern: Regular pattern and RR 12-20 bpm  Breath Sounds: Slightly diminished and/or crackles  Cough: Strong, spontaneous, non-productive  Indication for Bronchodilator Therapy: Decreased or absent breath sounds  Bronchodilator Assessment Score: 2    Aerosolized bronchodilator medication orders have been revised according to the RT Nebulizer Bronchodilator Protocol below.    Respiratory Therapist to perform RT Therapy Protocol Assessment initially then follow the protocol.  Repeat RT Therapy Protocol Assessment PRN for score 0-3 or on second treatment, BID, and PRN for scores above 3.    No Indications - adjust the frequency to every 6 hours PRN wheezing or bronchospasm, if no treatments needed after 48 hours then discontinue using Per Protocol order mode.     If indication present, adjust the RT bronchodilator orders based on the Bronchodilator Assessment Score as indicated below.  If a patient is on this medication at home then do not decrease Frequency below that used at home.    0-3 - enter or revise RT bronchodilator order(s) to equivalent RT Bronchodilator order with Frequency of every 4 hours PRN for wheezing or increased work of breathing using Per Protocol order mode.       4-6 - enter or revise RT Bronchodilator order(s) to two equivalent RT bronchodilator orders with one order with BID Frequency and one order with Frequency of every

## 2024-03-17 NOTE — PROGRESS NOTES
ICU to Wards Transfer  Internal Medicine Wards Acceptance Note   [facility]        The ICU-PAUSE transfer documentation has been acknowledged and reviewed by the wards team.  Additionally, the wards team has received official sign-out from the ICU team with acceptance of care of the patient.      At the Time of transfer, patient was seen and examined by the wards team with the findings below:    Review of Systems      Physical Exam  Constitutional:       General: He is not in acute distress.     Appearance: He is ill-appearing.   Cardiovascular:      Rate and Rhythm: Normal rate and regular rhythm.      Heart sounds: No murmur heard.     No gallop.   Pulmonary:      Effort: Pulmonary effort is normal. No respiratory distress.      Breath sounds: Normal breath sounds. No wheezing.   Abdominal:      General: There is no distension.      Palpations: Abdomen is soft.      Tenderness: There is no abdominal tenderness. There is no guarding.   Musculoskeletal:      Right lower leg: No edema.      Left lower leg: No edema.       Updates to Assessment & Plan at this time:    Sepsis 2/2 UTI vs PNA  Recently hospitalized for UTI which grew pansensitive Proteus mirabilis. Treated w/ 7 days meropenem and vancomycin. 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 Previous cultures have grown MDR Marganella and Enterococcus.  - Urine culture  - zyvox and zosyn 3/17-  - Likely will require longer treatment course    Multilocular Left lower lobe abscess vs empyema   Noncontrast CT with 8 x 4 cm multilocular left lower lobe abscess versus empyema. Extensive bilateral dependent consolidation and volume loss which may represent a combination of atelectasis and pneumonia. Mild mediastinal lymphadenopathy is nonspecific but may be reactive.   - may need a pulm consult      #Acute kidney injury- improving   Baseline creatinine 0.7-1.0. Creatinine on admission 2.2. .  Suspect prerenal etiology v intrarenal since patient has indwelling catheter.  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  - No acute action at this time     #H/o obtunded mental status  Currently AAOx4. Responding appropriately after occasional repeated prompting.  - EEG performed during previous hospitalization showed generalized slowing background activity, no epileptiform activity  - Monitor for signs of mental status decline     #Hypothyroidism  - Home Synthroid    Hospitalist assigned: Dr.Shehzad Cecilia Gonzalez MD  PGY-1

## 2024-03-18 ENCOUNTER — APPOINTMENT (OUTPATIENT)
Dept: ULTRASOUND IMAGING | Age: 84
End: 2024-03-18
Payer: MEDICARE

## 2024-03-18 LAB
ALBUMIN SERPL-MCNC: 2.7 G/DL (ref 3.4–5)
ANION GAP SERPL CALCULATED.3IONS-SCNC: 9 MMOL/L (ref 3–16)
BASOPHILS # BLD: 0.1 K/UL (ref 0–0.2)
BASOPHILS NFR BLD: 0.5 %
BUN SERPL-MCNC: 92 MG/DL (ref 7–20)
CALCIUM SERPL-MCNC: 8 MG/DL (ref 8.3–10.6)
CHLORIDE SERPL-SCNC: 101 MMOL/L (ref 99–110)
CO2 SERPL-SCNC: 31 MMOL/L (ref 21–32)
CREAT SERPL-MCNC: 1.5 MG/DL (ref 0.8–1.3)
CREAT UR-MCNC: 29.4 MG/DL (ref 39–259)
DEPRECATED RDW RBC AUTO: 18.8 % (ref 12.4–15.4)
EOSINOPHIL # BLD: 0.4 K/UL (ref 0–0.6)
EOSINOPHIL NFR BLD: 3.1 %
GFR SERPLBLD CREATININE-BSD FMLA CKD-EPI: 46 ML/MIN/{1.73_M2}
GLUCOSE SERPL-MCNC: 98 MG/DL (ref 70–99)
HCT VFR BLD AUTO: 30.1 % (ref 40.5–52.5)
HGB BLD-MCNC: 9.4 G/DL (ref 13.5–17.5)
IRON SATN MFR SERPL: 15 % (ref 20–50)
IRON SERPL-MCNC: 23 UG/DL (ref 59–158)
LYMPHOCYTES # BLD: 1 K/UL (ref 1–5.1)
LYMPHOCYTES NFR BLD: 7.9 %
MAGNESIUM SERPL-MCNC: 2.2 MG/DL (ref 1.8–2.4)
MCH RBC QN AUTO: 27.7 PG (ref 26–34)
MCHC RBC AUTO-ENTMCNC: 31.4 G/DL (ref 31–36)
MCV RBC AUTO: 88.3 FL (ref 80–100)
MONOCYTES # BLD: 0.9 K/UL (ref 0–1.3)
MONOCYTES NFR BLD: 6.6 %
NEUTROPHILS # BLD: 10.6 K/UL (ref 1.7–7.7)
NEUTROPHILS NFR BLD: 81.9 %
PHOSPHATE SERPL-MCNC: 3 MG/DL (ref 2.5–4.9)
PLATELET # BLD AUTO: 200 K/UL (ref 135–450)
PMV BLD AUTO: 9.3 FL (ref 5–10.5)
POTASSIUM SERPL-SCNC: 4.3 MMOL/L (ref 3.5–5.1)
RBC # BLD AUTO: 3.41 M/UL (ref 4.2–5.9)
SODIUM SERPL-SCNC: 141 MMOL/L (ref 136–145)
TIBC SERPL-MCNC: 154 UG/DL (ref 260–445)
WBC # BLD AUTO: 12.9 K/UL (ref 4–11)

## 2024-03-18 PROCEDURE — 2060000000 HC ICU INTERMEDIATE R&B

## 2024-03-18 PROCEDURE — 36415 COLL VENOUS BLD VENIPUNCTURE: CPT

## 2024-03-18 PROCEDURE — 76770 US EXAM ABDO BACK WALL COMP: CPT

## 2024-03-18 PROCEDURE — 6360000002 HC RX W HCPCS

## 2024-03-18 PROCEDURE — 2580000003 HC RX 258

## 2024-03-18 PROCEDURE — 6360000002 HC RX W HCPCS: Performed by: STUDENT IN AN ORGANIZED HEALTH CARE EDUCATION/TRAINING PROGRAM

## 2024-03-18 PROCEDURE — 80069 RENAL FUNCTION PANEL: CPT

## 2024-03-18 PROCEDURE — 6370000000 HC RX 637 (ALT 250 FOR IP): Performed by: STUDENT IN AN ORGANIZED HEALTH CARE EDUCATION/TRAINING PROGRAM

## 2024-03-18 PROCEDURE — 2580000003 HC RX 258: Performed by: STUDENT IN AN ORGANIZED HEALTH CARE EDUCATION/TRAINING PROGRAM

## 2024-03-18 PROCEDURE — 92610 EVALUATE SWALLOWING FUNCTION: CPT

## 2024-03-18 PROCEDURE — 85025 COMPLETE CBC W/AUTO DIFF WBC: CPT

## 2024-03-18 PROCEDURE — 2580000003 HC RX 258: Performed by: INTERNAL MEDICINE

## 2024-03-18 PROCEDURE — 99221 1ST HOSP IP/OBS SF/LOW 40: CPT | Performed by: NURSE PRACTITIONER

## 2024-03-18 PROCEDURE — 6370000000 HC RX 637 (ALT 250 FOR IP)

## 2024-03-18 PROCEDURE — 83735 ASSAY OF MAGNESIUM: CPT

## 2024-03-18 RX ORDER — ENOXAPARIN SODIUM 100 MG/ML
40 INJECTION SUBCUTANEOUS DAILY
Status: DISCONTINUED | OUTPATIENT
Start: 2024-03-19 | End: 2024-03-21 | Stop reason: HOSPADM

## 2024-03-18 RX ORDER — 0.9 % SODIUM CHLORIDE 0.9 %
1000 INTRAVENOUS SOLUTION INTRAVENOUS ONCE
Status: COMPLETED | OUTPATIENT
Start: 2024-03-18 | End: 2024-03-18

## 2024-03-18 RX ADMIN — SODIUM CHLORIDE, PRESERVATIVE FREE 10 ML: 5 INJECTION INTRAVENOUS at 20:02

## 2024-03-18 RX ADMIN — LINEZOLID 600 MG: 600 INJECTION, SOLUTION INTRAVENOUS at 20:09

## 2024-03-18 RX ADMIN — ATORVASTATIN CALCIUM 80 MG: 80 TABLET, FILM COATED ORAL at 20:02

## 2024-03-18 RX ADMIN — SODIUM CHLORIDE, SODIUM GLUCONATE, SODIUM ACETATE, POTASSIUM CHLORIDE AND MAGNESIUM CHLORIDE 100 ML/HR: 526; 502; 368; 37; 30 INJECTION, SOLUTION INTRAVENOUS at 18:44

## 2024-03-18 RX ADMIN — MICONAZOLE NITRATE: 20 POWDER TOPICAL at 20:02

## 2024-03-18 RX ADMIN — SODIUM CHLORIDE, PRESERVATIVE FREE 10 ML: 5 INJECTION INTRAVENOUS at 08:28

## 2024-03-18 RX ADMIN — LINEZOLID 600 MG: 600 INJECTION, SOLUTION INTRAVENOUS at 09:07

## 2024-03-18 RX ADMIN — PIPERACILLIN AND TAZOBACTAM 3375 MG: 3; .375 INJECTION, POWDER, FOR SOLUTION INTRAVENOUS; PARENTERAL at 03:34

## 2024-03-18 RX ADMIN — PANTOPRAZOLE SODIUM 40 MG: 40 TABLET, DELAYED RELEASE ORAL at 05:22

## 2024-03-18 RX ADMIN — SODIUM CHLORIDE, SODIUM GLUCONATE, SODIUM ACETATE, POTASSIUM CHLORIDE AND MAGNESIUM CHLORIDE 100 ML/HR: 526; 502; 368; 37; 30 INJECTION, SOLUTION INTRAVENOUS at 08:32

## 2024-03-18 RX ADMIN — PIPERACILLIN AND TAZOBACTAM 3375 MG: 3; .375 INJECTION, POWDER, FOR SOLUTION INTRAVENOUS; PARENTERAL at 12:02

## 2024-03-18 RX ADMIN — LEVOTHYROXINE SODIUM 75 MCG: 75 TABLET ORAL at 05:22

## 2024-03-18 RX ADMIN — SODIUM CHLORIDE 1000 ML: 9 INJECTION, SOLUTION INTRAVENOUS at 08:34

## 2024-03-18 RX ADMIN — TRAZODONE HYDROCHLORIDE 150 MG: 100 TABLET ORAL at 20:02

## 2024-03-18 RX ADMIN — TAMSULOSIN HYDROCHLORIDE 0.4 MG: 0.4 CAPSULE ORAL at 08:27

## 2024-03-18 RX ADMIN — ENOXAPARIN SODIUM 30 MG: 100 INJECTION SUBCUTANEOUS at 08:27

## 2024-03-18 RX ADMIN — PIPERACILLIN AND TAZOBACTAM 3375 MG: 3; .375 INJECTION, POWDER, FOR SOLUTION INTRAVENOUS; PARENTERAL at 20:06

## 2024-03-18 RX ADMIN — MICONAZOLE NITRATE: 20 POWDER TOPICAL at 08:30

## 2024-03-18 ASSESSMENT — PAIN SCALES - WONG BAKER
WONGBAKER_NUMERICALRESPONSE: NO HURT

## 2024-03-18 ASSESSMENT — ENCOUNTER SYMPTOMS
ABDOMINAL PAIN: 0
SHORTNESS OF BREATH: 0

## 2024-03-18 ASSESSMENT — PAIN SCALES - GENERAL
PAINLEVEL_OUTOF10: 0

## 2024-03-18 NOTE — PROGRESS NOTES
Speech Language Pathology  Facility/Department:[facility]   Dysphagia Evaluation    Name: Tushar Block  : 1940  MRN: [id number]                                                     Patient Diagnosis(es):   Patient Active Problem List    Diagnosis Date Noted    Normocytic anemia 2022    Electrolyte imbalance     Weight loss counseling, encounter for     Elevated sed rate     Elevated C-reactive protein (CRP)     Infection requiring contact isolation precautions     Class 1 obesity due to excess calories with body mass index (BMI) of 30.0 to 30.9 in adult     Chronic multifocal osteomyelitis of right foot (HCC) 2022    MRSA infection 2022    Septic arthritis of interphalangeal joint of toe of right foot ([facility]) 2022    Osteomyelitis (HCC) 2022    Multiple drug resistant organism (MDRO) culture positive 2022    Uremic encephalopathy 2024    Transient unconsciousness 2024    Venous stasis ulcer of right calf limited to breakdown of skin without varicose veins ([facility]) 2024    Sepsis ([facility]) 2024    Loculated pleural effusion 2024    Round atelectasis 2024    Sepsis secondary to UTI ([facility]) 2024    Symptomatic sinus bradycardia 2022    Bilateral leg edema 2022    Symptomatic bradycardia     Weakness 2022    Pain of right lower extremity     Degloving injury     Leg laceration, unspecified laterality, initial encounter 10/31/2021    Altered mental status     Hyperkalemia     Encephalopathy acute 2021    Acute renal failure superimposed on stage 3 chronic kidney disease ([facility]) 02/15/2021    Urinary tract infection associated with indwelling urethral catheter ([facility]) 02/15/2021    Acute cystitis without hematuria 2021    Abnormal angiogram 2020    H/O angiography 2020    Abnormal stress test 2020    Constipation 2020    Encopresis with constipation and overflow incontinence 2020     occurs and rationale for diet recommendation/strategies to reduce risk for aspiration.Pt instructed to notify staff if any signs of aspiration emerge or dysphagia concerns. Pt stated comprehension   Cont goal    Education  Please see above.    Pt goal: denies need  Pt discharge goal: to get out of hospital    Total treatment time: 15 eval    Plan: Continue per POC  Recommended Diet: downgrade to soft/bite sized -if any s/s of aspiration emerge, or there is respiratory decline,  make NPO until further evaluated by SLP    Medication administration: in puree    Strategies:   Feed only if fully alert and able to maintain alertness  90  degrees all meals  Check for pocketing of food  Discharge Recommendation: most likely will not require follow up  Discussed with RNRomina  Needs met prior to leaving room, call light within reach    SALLIE SWAIN M.S./CCC-SLP #[id number]  Pg. # [phone]  This document will serve as a dc summary if pt dc prior to next visit

## 2024-03-18 NOTE — PROGRESS NOTES
Internal Medicine Progress Note    Date: 3/17/2024   Patient: Tushar Block   Hospital Day: 1      CC: Fever (Lethargic, not feeling well, history of sepsis. )     Interval Hx   Briefly, Tushar Block is a 83 y.o. M admitted for septic shock on 3/16 and placed on broad spectrum abx. Initially admitted to ICU due to hypotension but did not require vasopressor support and was transferred to [facility] 3/17 for further management. Urine culture and BC with NGTD. Sepsis likely 2/2 PNA.     Bradycardic, and hypotensive overnight. Pt seen and examined this AM. Falls asleep on exam, but oriented x3 when awake. Vitals stable. Leukocytosis improving. BMP shows improvement in  >92, and Cr 1.7>1.5. Will d/c flomax and see if this improves pt overnight BP.     Due to concern for aspiration. SLP consulted and recommend soft and bite sized diet.     ROS   Review of Systems   Constitutional:  Positive for appetite change (decreased).   Respiratory:  Negative for shortness of breath.    Cardiovascular:  Negative for chest pain.   Gastrointestinal:  Negative for abdominal pain.   Limited ROS as pt sleeps on exam.     Objective     Vital Signs:  Patient Vitals for the past 8 hrs:   BP Temp Temp src Pulse Resp SpO2   03/17/24 1800 (!) 108/43 -- -- 76 14 94 %   03/17/24 1700 128/65 97 °F (36.1 °C) -- 79 13 91 %   03/17/24 1610 129/85 (!) 96.4 °F (35.8 °C) Temporal (!) 46 10 95 %   03/17/24 1500 (!) 122/58 -- -- 64 10 96 %   03/17/24 1400 129/66 -- -- 66 (!) 9 96 %   03/17/24 1305 114/68 -- -- 80 12 90 %       Physical Exam  Constitutional:       Comments: Elderly male. Falls asleep on exam. In no acute distress.    HENT:      Head: Normocephalic and atraumatic.   Eyes:      Conjunctiva/sclera: Conjunctivae normal.   Cardiovascular:      Rate and Rhythm: Normal rate and regular rhythm.      Heart sounds: Murmur (systolic) heard.   Pulmonary:      Breath sounds: No wheezing or rhonchi.      Comments: Mild crackles to R  discharge: TBD    Will discuss with attending physician Fidel Cordova MD     _____________________  Ksenia Hoover,    Internal Medicine Resident, PGY-1  03/17/24

## 2024-03-18 NOTE — PROGRESS NOTES
Comprehensive Nutrition Assessment    RECOMMENDATIONS:  PO Diet: NPO, advance as medically appropriate  ONS: NPO  Nutrition Education: Education not indicated     NUTRITION ASSESSMENT:   Nutritional summary & status: MST: Patient currently NPO. Previously on soft & bite-sized this AM. Per SLP, rec'd NPO if respiratory status declines or if s/s aspiration. Adequate intake on previous diet, >50%. Wt stable PTA. Noted stg. 3 PI on buttocks, will add expedite for wound healing upon diet advancement.     Admission // PMH: uremic encephalopathy // CAD, HTN, HLD, hypothyroidism    MALNUTRITION ASSESSMENT  Context of Malnutrition: Acute Illness      Findings of the 6 clinical characteristics of malnutrition (Minimum of 2 out of 6 clinical characteristics is required to make the diagnosis of moderate or severe Protein Calorie Malnutrition based on AND/ASPEN Guidelines):  Energy Intake:  Mild decrease in energy intake (Comment)  Weight Loss:  No significant weight loss     Body Fat Loss:  No significant body fat loss     Muscle Mass Loss:  No significant muscle mass loss        NUTRITION DIAGNOSIS   In context of acute illness or injury related to inadequate protein-energy intake as evidenced by NPO or clear liquid status due to medical condition    Nutrition Monitoring and Evaluation:   Food/Nutrient Intake Outcomes:  Diet Advancement/Tolerance  Physical Signs/Symptoms Outcomes:  Biochemical Data, Nutrition Focused Physical Findings, Weight, Skin     OBJECTIVE DATA: Significant to nutrition assessment  Nutrition Related Findings: Cr 1.5, + 2 L  Wounds: Pressure Injury, Stage III  Nutrition Goals: Meet at least 75% of estimated needs, by next RD assessment     CURRENT NUTRITION THERAPIES  Diet NPO  PO Intake: 51-75%, %   PO Supplement Intake:None Ordered  Additional Sources of Calories/IVF:electrolyte-a     COMPARATIVE STANDARDS  Energy (kcal):  8007-9537 (22-24 kcal/kg CBW)     Protein (g):   (1.1-1.3 g/kg IBW)

## 2024-03-18 NOTE — PLAN OF CARE
Problem: Discharge Planning  Goal: Discharge to home or other facility with appropriate resources  Outcome: Progressing  Flowsheets (Taken 3/17/2024 0800 by Susanna Welch, RN)  Discharge to home or other facility with appropriate resources:   Identify barriers to discharge with patient and caregiver   Arrange for needed discharge resources and transportation as appropriate   Identify discharge learning needs (meds, wound care, etc)     Problem: Skin/Tissue Integrity  Goal: Absence of new skin breakdown  Description: 1.  Monitor for areas of redness and/or skin breakdown  2.  Assess vascular access sites hourly  3.  Every 4-6 hours minimum:  Change oxygen saturation probe site  4.  Every 4-6 hours:  If on nasal continuous positive airway pressure, respiratory therapy assess nares and determine need for appliance change or resting period.  Outcome: Progressing     Problem: Safety - Adult  Goal: Free from fall injury  Outcome: Progressing     Problem: Pain  Goal: Verbalizes/displays adequate comfort level or baseline comfort level  Outcome: Progressing  Flowsheets (Taken 3/17/2024 0800 by Susanna Welch, RN)  Verbalizes/displays adequate comfort level or baseline comfort level:   Encourage patient to monitor pain and request assistance   Assess pain using appropriate pain scale   Administer analgesics based on type and severity of pain and evaluate response     Problem: Neurosensory - Adult  Goal: Achieves stable or improved neurological status  Outcome: Progressing  Flowsheets (Taken 3/18/2024 1327)  Achieves stable or improved neurological status:   Initiate measures to prevent increased intracranial pressure   Maintain blood pressure and fluid volume within ordered parameters to optimize cerebral perfusion and minimize risk of hemorrhage   Monitor temperature, glucose, and sodium. Initiate appropriate interventions as ordered   Assess for and report changes in neurological status  Goal: Absence of  needed   Discuss catheterization for long term situations as appropriate   Assess patient’s ability to void and empty bladder   Monitor intake/output and perform bladder scan as needed   Discuss with Licensed Independent Practitioner  medications to alleviate retention as needed  Goal: Urinary catheter remains patent  Outcome: Progressing  Flowsheets (Taken 3/18/2024 1327)  Urinary catheter remains patent:   Assess patency of urinary catheter   Irrigate catheter per Licensed Independent Practitioner order if indicated and notify Licensed Independent Practitioner if unable to irrigate   Assess need for a larger catheter size or a 3-way catheter for continuous bladder irrigation     Problem: Infection - Adult  Goal: Absence of infection at discharge  Outcome: Progressing  Flowsheets (Taken 3/17/2024 0800 by Susanna Welch, RN)  Absence of infection at discharge:   Assess and monitor for signs and symptoms of infection   Monitor lab/diagnostic results   Monitor all insertion sites i.e., indwelling lines, tubes and drains  Goal: Absence of infection during hospitalization  Outcome: Progressing  Flowsheets (Taken 3/17/2024 0800 by Susanna Welch, RN)  Absence of infection during hospitalization:   Assess and monitor for signs and symptoms of infection   Monitor lab/diagnostic results   Monitor all insertion sites i.e., indwelling lines, tubes and drains  Goal: Absence of fever/infection during anticipated neutropenic period  Outcome: Progressing  Flowsheets (Taken 3/18/2024 1327)  Absence of fever/infection during anticipated neutropenic period:   Monitor white blood cell count   Administer growth factors as ordered   Implement neutropenic guidelines     Problem: Metabolic/Fluid and Electrolytes - Adult  Goal: Electrolytes maintained within normal limits  Outcome: Progressing  Flowsheets (Taken 3/18/2024 1327)  Electrolytes maintained within normal limits:   Monitor labs and assess patient for signs and symptoms of

## 2024-03-18 NOTE — PROGRESS NOTES
I have seen, examined and evaluated the patient as did the resident physician, on 3/18/24. We have discussed the plan of care and decisions made during that discussion were incorporated into this note. I have reviewed the resident physician's note and agree with the assessment and plan of care as documented.    Sleeping but arousable  Able to open his eyes he is slow to respond but able to answer questions appropriately, he knows he is in progressive care unit at [facility]  Says he does not have much of an appetite and does not like the food at the nursing home hence he was not eating  States he will try to eat more  Oxygen requirement coming down treat his nasal cannula oxygen  Continue antibiotics for aspiration pneumonia coverage  BUN and creatinine improving 92 and 1.5 this morning  Would continue IV fluids for now    Perative care consulted, I do not feel patient is able to understand risk benefits alternatives of treatment  Patient is currently full code.  Patient's son who is the power of  has been involved in decision making    Problem list  Acute encephalopathy present admission possibly due to acute kidney injury, unintentional baclofen toxicity in the setting of acute kidney injury  ALIS severe acidemia /creatinine 2.2, improving with IV fluids  Right-sided pneumonia due to aspiration gram-negative/anaerobic organisms -continue piperacillin/tazobactam and linezolid  History of CAD s/p coronary bypass graft  Paroxysmal atrial fibrillation history of GI bleed not on anticoagulation  History of MDRO  Chronic indwelling Iglesias catheter, urine culture negative UTI ruled out  Chronic lower extremity wounds pressure wounds present admission  Hypothyroidism  Neuromuscular disorder on baclofen pump    Discussed management of the case with case management and palliative care team    Fidel Jimenez MD  Hospitalist  Attending Physician

## 2024-03-18 NOTE — CARE COORDINATION
Case Management Assessment  Initial Evaluation    Date/Time of Evaluation: 3/18/2024 4:14 PM  Assessment Completed by: January Vail    If patient is discharged prior to next notation, then this note serves as note for discharge by case management.    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 No    Chart Reviewed: Yes      History Provided by: Patient, Medical Record  Patient Orientation: Alert and Oriented    Patient Cognition: Alert    Hospitalization in the last 30 days (Readmission):  Yes    If yes, Readmission Assessment in CM Navigator will be completed.  Readmission Assessment  Number of Days since last admission?: 1-7 days  Previous Disposition: Other (comment) (AL with hospice)  Who is being Interviewed: Patient  What was the patient's/caregiver's perception as to why they think they needed to return back to the hospital?: Other (Comment) (new symptoms: fever, lethargy)  Did you visit your Primary Care Physician after you left the hospital, before you returned this time?: No  Why weren't you able to visit your PCP?: Other (Comment) (DC to AL with hospice)  Did you see a specialist, such as Cardiac, Pulmonary, Orthopedic Physician, etc. after you left the hospital?: Other (Comment) (hospice provider)  Who advised the patient to return to the hospital?: Self-referral, Caregiver (self/AL)  Does the patient report anything that got in the way of taking their medications?: No  In our efforts to provide the best possible care to you and others like you, can you think of anything that  we could have done to help you after you left the hospital the first time, so that you might not have needed to return so soon?: Other (Comment) (declines)      Advance Directives:      Code Status: Full Code   Patient's Primary Decision Maker is: Named in Scanned ACP Document (docs placed in hard chart)    Primary Decision Maker: Jourdan Block - Taylor - [phone]    Discharge Planning:    Patient lives with: Alone Type of Home: Skilled Nursing Facility  Primary Care Giver: Other (Comment) (AL & hospice)  Patient Support Systems include: Family Members, Home Care Staff   Current Financial resources: Medicare  Current community resources: Assisted Living, Hospice  Current services prior to admission: Skilled Nursing Facility            Current DME:              Type of Home Care services:  Skilled Therapy    ADLS  Prior functional level: Assistance with the following:, Bathing, Dressing, Toileting, Cooking, Housework, Shopping, Mobility  Current functional level: Assistance with the following:, Bathing, Dressing, Toileting, Cooking, Housework, Shopping, Mobility    PT AM-PAC:   /24  OT AM-PAC:   /24    Family can provide assistance at DC: No  Would you like Case Management to discuss the discharge plan with any other family members/significant others, and if so, who? No  Plans to Return to Present Housing: Yes  Other Identified Issues/Barriers to RETURNING to current housing: none  Potential Assistance needed at discharge: Skilled Nursing Facility            Potential DME:    Patient expects to discharge to: Skilled nursing facility  Plan for transportation at discharge:      Financial    Payor: [employer] MEDICARE / Plan: [employer] GOLD PLUS HMO / Product Type: *No Product type* /     Does insurance require precert for SNF: Yes    Potential assistance Purchasing Medications:    Meds-to-Beds request: No      [facility] - [facility] [address] - P [phone] - F [phone]

## 2024-03-18 NOTE — PROGRESS NOTES
[facility] residents notified of patients blood pressure being low. Pt is asymptomatic. Most recent reading 72/49 (58). Instructed to continue to monitor. No new orders at this time.    Bed is in lowest position with wheels locked and bed alarm active. Call light is within reach.

## 2024-03-18 NOTE — CONSULTS
full/confusion  [] 50% Mainly sit/lie; Extensive disease; Can't do any work; Considerable assist; intake normal  Or reduced; LOC full/confusion  [] 40% Mainly in bed; Extensive disease; Mainly assist; intake normal or reduced; occasional assist; LOC full/confusion  [x] 30% Bed Bound; Extensive disease; Total care; intake reduced; LOC full/confusion  [] 20% Bed Bound; Extensive disease; Total care; intake minimal; Drowsy/coma  [] 10% Bed Bound; Extensive disease; Total care; Mouth care only; Drowsy/coma  [] 0% Death    PPS: 30    Vitals:    BP (!) 131/44   Pulse 87   Temp 98.7 °F (37.1 °C) (Oral)   Resp 16   Ht 1.829 m (6' 0.01\")   Wt 90.1 kg (198 lb 10.2 oz)   SpO2 91%   BMI 26.93 kg/m²     Labs:    BMP:   Recent Labs     03/16/24  1157 03/17/24  0442 03/18/24  0546    139 141   K 4.8 4.2 4.3   CL 99 101 101   CO2 29 28 31   * 123* 92*   CREATININE 2.2* 1.7* 1.5*   GLUCOSE 142* 127* 98   PHOS  --  3.5 3.0     CBC:   Recent Labs     03/16/24  1157 03/17/24  0442 03/18/24  0546   WBC 16.2* 13.4* 12.9*   HGB 9.6* 9.8* 9.4*   HCT 30.0* 30.7* 30.1*    194 200       LFT's:   Recent Labs     03/16/24  1157   AST 9*   ALT <5*   BILITOT 0.3   ALKPHOS 106     Troponin: No results for input(s): \"TROPONINI\" in the last 72 hours.  BNP: No results for input(s): \"BNP\" in the last 72 hours.  ABGs: No results for input(s): \"PHART\", \"QNH8FAD\", \"PO2ART\" in the last 72 hours.  INR: No results for input(s): \"INR\" in the last 72 hours.    U/A:  Recent Labs     03/16/24  1400   COLORU Yellow   PHUR 6.0   WBCUA *   RBCUA 3-4   BACTERIA 1+*   CLARITYU Clear   SPECGRAV 1.020   LEUKOCYTESUR LARGE*   UROBILINOGEN 0.2   BILIRUBINUR Negative   BLOODU MODERATE*   GLUCOSEU Negative   AMORPHOUS 1+       CT CHEST ABDOMEN PELVIS WO CONTRAST Additional Contrast? None   Final Result      CHEST:      1.  Noncontrast CT with 8 x 4 cm multilocular left lower lobe abscess versus   empyema.   2.  Extensive bilateral  dependent consolidation and volume loss which may   represent a combination of atelectasis and pneumonia.   3.  Mild mediastinal lymphadenopathy is nonspecific but may be reactive.      ABDOMEN/PELVIS:      1.  Noncontrast CT demonstrating moderate proctitis and cystitis.   2.  Left nephrolithiasis.   3.  Cholelithiasis.         XR CHEST PORTABLE   Final Result   1. Status post CABG with early congestive heart failure   2. Atelectatic changes right lung base      Electronically signed by MD Nathaniel Rangel      US RENAL COMPLETE    (Results Pending)         Conclusion/Time spent:         Recommendations see above    Time spent with patient and/or family: 30  Time reviewing records: 10 min   Time communicating with staff: 10 min     A total of 50 minutes spent with the patient and family on unit greater than 50% in counseling regarding palliative care and in goals of care for the patient.    Thank you to Dr. Jimenez for this consultation. We will continue to follow Mr. Block's care as needed.      Adelia Chand [facility]  Inpatient Palliative Care  [phone]

## 2024-03-18 NOTE — PROGRESS NOTES
2.2.  1 week earlier, the patient's BUN was 12 and creatinine was 0.6 and the sodium level was 126.  proBNP was 2070 chest x-ray showed possible early congestive heart failure.     Nephrology was consulted for evaluation of patient's ALIS and severe azotemia.  I saw the patient in his room in ICU with his nurse at his bedside.  The patient looked up pale and frail but under no acute distress.  The patient appears to be dry in his mouth mucosa is very dry.  The patient has no pitting edema  He has a Iglesias catheter.    Vitals:     Vitals:    03/18/24 0700   BP:    Pulse:    Resp:    Temp: 98.7 °F (37.1 °C)   SpO2:          Physical Examination:     General appearance: in no acute distress.   HEENT:   Respiratory: Respiratory effort normal, bilateral equal chest rise. No wheeze, no crackles no  Cardiovascular: Ausculation shows RRR and no edema   Abdomen: abdomen is soft, non distended  Musculoskeletal:  no joint swelling, no deformity    Meds:      levothyroxine  75 mcg Oral Daily    pantoprazole  40 mg Oral QAM AC    traZODone  150 mg Oral Nightly    sodium chloride flush  5-40 mL IntraVENous 2 times per day    enoxaparin  30 mg SubCUTAneous Daily    electrolyte-A  500 mL IntraVENous Once    linezolid  600 mg IntraVENous Q12H    piperacillin-tazobactam  3,375 mg IntraVENous Q8H    miconazole   Topical BID    atorvastatin  80 mg Oral Nightly    tamsulosin  0.4 mg Oral Daily       [place] [address]  Office: [phone]  Fax: [phone]  [facility] [place] [facility]

## 2024-03-18 NOTE — PROGRESS NOTES
Pharmacist Review and Automatic Dose Adjustment of Prophylactic Enoxaparin    The reviewing pharmacist has made an adjustment to the ordered enoxaparin dose or converted to UFH per the approved [facility] protocol and table as defined below.    Plan / Rationale: Based upon the patient's weight and renal function, the ordered dose of enoxaparin 30 mg subQ daily has been converted to 40 mg subQ daily.    Please call with questions--  Analisa Solomon, PharmD, [facility]  Wireless: [phone]   3/18/2024 10:15 AM        Tushar Block is a 83 y.o. male.     Recent Labs     03/16/24  1157 03/17/24  0442 03/18/24  0546   CREATININE 2.2* 1.7* 1.5*       Estimated Creatinine Clearance: 42 mL/min (A) (based on SCr of 1.5 mg/dL (H)).    Recent Labs     03/17/24  0442 03/18/24  0546   HGB 9.8* 9.4*   HCT 30.7* 30.1*    200     No results for input(s): \"INR\" in the last 72 hours.    Height:   Ht Readings from Last 1 Encounters:   03/17/24 1.829 m (6' 0.01\")     Weight:  Wt Readings from Last 1 Encounters:   03/18/24 90.1 kg (198 lb 10.2 oz)

## 2024-03-18 NOTE — PROGRESS NOTES
Patient continues to remain hypotensive when sleeping. ICU [facility] residents made aware. Most recent when sleeping is 66/45 (48). Upon waking it is 113/56 (72). Denies being lightheaded or dizzy.      Instructed to elevate patients legs when sleeping. No new orders at this time.

## 2024-03-19 ENCOUNTER — APPOINTMENT (OUTPATIENT)
Dept: GENERAL RADIOLOGY | Age: 84
End: 2024-03-19
Payer: MEDICARE

## 2024-03-19 LAB
ALBUMIN SERPL-MCNC: 2.5 G/DL (ref 3.4–5)
ANION GAP SERPL CALCULATED.3IONS-SCNC: 8 MMOL/L (ref 3–16)
BASOPHILS # BLD: 0.1 K/UL (ref 0–0.2)
BASOPHILS NFR BLD: 0.7 %
BUN SERPL-MCNC: 62 MG/DL (ref 7–20)
CALCIUM SERPL-MCNC: 8.2 MG/DL (ref 8.3–10.6)
CHLORIDE SERPL-SCNC: 104 MMOL/L (ref 99–110)
CO2 SERPL-SCNC: 30 MMOL/L (ref 21–32)
CREAT SERPL-MCNC: 1.2 MG/DL (ref 0.8–1.3)
DEPRECATED RDW RBC AUTO: 19.5 % (ref 12.4–15.4)
EOSINOPHIL # BLD: 0.3 K/UL (ref 0–0.6)
EOSINOPHIL NFR BLD: 3.3 %
GFR SERPLBLD CREATININE-BSD FMLA CKD-EPI: >60 ML/MIN/{1.73_M2}
GLUCOSE BLD-MCNC: 143 MG/DL (ref 70–99)
GLUCOSE SERPL-MCNC: 117 MG/DL (ref 70–99)
HCT VFR BLD AUTO: 30.3 % (ref 40.5–52.5)
HGB BLD-MCNC: 9.7 G/DL (ref 13.5–17.5)
LYMPHOCYTES # BLD: 1.1 K/UL (ref 1–5.1)
LYMPHOCYTES NFR BLD: 10.5 %
MAGNESIUM SERPL-MCNC: 2.2 MG/DL (ref 1.8–2.4)
MCH RBC QN AUTO: 28.8 PG (ref 26–34)
MCHC RBC AUTO-ENTMCNC: 31.9 G/DL (ref 31–36)
MCV RBC AUTO: 90.3 FL (ref 80–100)
MONOCYTES # BLD: 0.6 K/UL (ref 0–1.3)
MONOCYTES NFR BLD: 6.1 %
NEUTROPHILS # BLD: 8.3 K/UL (ref 1.7–7.7)
NEUTROPHILS NFR BLD: 79.4 %
PERFORMED ON: ABNORMAL
PHOSPHATE SERPL-MCNC: 2.4 MG/DL (ref 2.5–4.9)
PLATELET # BLD AUTO: 217 K/UL (ref 135–450)
PMV BLD AUTO: 8.9 FL (ref 5–10.5)
POTASSIUM SERPL-SCNC: 4 MMOL/L (ref 3.5–5.1)
RBC # BLD AUTO: 3.36 M/UL (ref 4.2–5.9)
SODIUM SERPL-SCNC: 142 MMOL/L (ref 136–145)
WBC # BLD AUTO: 10.5 K/UL (ref 4–11)

## 2024-03-19 PROCEDURE — 36415 COLL VENOUS BLD VENIPUNCTURE: CPT

## 2024-03-19 PROCEDURE — 92611 MOTION FLUOROSCOPY/SWALLOW: CPT

## 2024-03-19 PROCEDURE — 6370000000 HC RX 637 (ALT 250 FOR IP)

## 2024-03-19 PROCEDURE — 6360000002 HC RX W HCPCS: Performed by: STUDENT IN AN ORGANIZED HEALTH CARE EDUCATION/TRAINING PROGRAM

## 2024-03-19 PROCEDURE — 2580000003 HC RX 258

## 2024-03-19 PROCEDURE — 2580000003 HC RX 258: Performed by: STUDENT IN AN ORGANIZED HEALTH CARE EDUCATION/TRAINING PROGRAM

## 2024-03-19 PROCEDURE — 83735 ASSAY OF MAGNESIUM: CPT

## 2024-03-19 PROCEDURE — 6360000002 HC RX W HCPCS

## 2024-03-19 PROCEDURE — 6370000000 HC RX 637 (ALT 250 FOR IP): Performed by: STUDENT IN AN ORGANIZED HEALTH CARE EDUCATION/TRAINING PROGRAM

## 2024-03-19 PROCEDURE — 92526 ORAL FUNCTION THERAPY: CPT

## 2024-03-19 PROCEDURE — 85025 COMPLETE CBC W/AUTO DIFF WBC: CPT

## 2024-03-19 PROCEDURE — 74230 X-RAY XM SWLNG FUNCJ C+: CPT

## 2024-03-19 PROCEDURE — 80069 RENAL FUNCTION PANEL: CPT

## 2024-03-19 PROCEDURE — 2060000000 HC ICU INTERMEDIATE R&B

## 2024-03-19 RX ORDER — POLYETHYLENE GLYCOL 3350 17 G/17G
17 POWDER, FOR SOLUTION ORAL 2 TIMES DAILY
Status: DISCONTINUED | OUTPATIENT
Start: 2024-03-19 | End: 2024-03-21 | Stop reason: HOSPADM

## 2024-03-19 RX ORDER — POLYETHYLENE GLYCOL 3350 17 G/17G
17 POWDER, FOR SOLUTION ORAL DAILY
Status: DISCONTINUED | OUTPATIENT
Start: 2024-03-19 | End: 2024-03-19

## 2024-03-19 RX ORDER — SENNOSIDES A AND B 8.6 MG/1
1 TABLET, FILM COATED ORAL 2 TIMES DAILY
Status: DISCONTINUED | OUTPATIENT
Start: 2024-03-19 | End: 2024-03-21 | Stop reason: HOSPADM

## 2024-03-19 RX ADMIN — MICONAZOLE NITRATE: 20 POWDER TOPICAL at 09:01

## 2024-03-19 RX ADMIN — POLYETHYLENE GLYCOL 3350 17 G: 17 POWDER, FOR SOLUTION ORAL at 20:58

## 2024-03-19 RX ADMIN — LINEZOLID 600 MG: 600 INJECTION, SOLUTION INTRAVENOUS at 09:01

## 2024-03-19 RX ADMIN — ATORVASTATIN CALCIUM 80 MG: 80 TABLET, FILM COATED ORAL at 20:58

## 2024-03-19 RX ADMIN — MICONAZOLE NITRATE: 20 POWDER TOPICAL at 23:49

## 2024-03-19 RX ADMIN — PIPERACILLIN AND TAZOBACTAM 3375 MG: 3; .375 INJECTION, POWDER, FOR SOLUTION INTRAVENOUS; PARENTERAL at 21:07

## 2024-03-19 RX ADMIN — SENNOSIDES 8.6 MG: 8.6 TABLET, FILM COATED ORAL at 14:21

## 2024-03-19 RX ADMIN — PANTOPRAZOLE SODIUM 40 MG: 40 TABLET, DELAYED RELEASE ORAL at 06:11

## 2024-03-19 RX ADMIN — PIPERACILLIN AND TAZOBACTAM 3375 MG: 3; .375 INJECTION, POWDER, FOR SOLUTION INTRAVENOUS; PARENTERAL at 04:59

## 2024-03-19 RX ADMIN — ENOXAPARIN SODIUM 40 MG: 100 INJECTION SUBCUTANEOUS at 08:54

## 2024-03-19 RX ADMIN — SODIUM CHLORIDE, PRESERVATIVE FREE 10 ML: 5 INJECTION INTRAVENOUS at 09:01

## 2024-03-19 RX ADMIN — LINEZOLID 600 MG: 600 INJECTION, SOLUTION INTRAVENOUS at 21:12

## 2024-03-19 RX ADMIN — POLYETHYLENE GLYCOL 3350 17 G: 17 POWDER, FOR SOLUTION ORAL at 14:21

## 2024-03-19 RX ADMIN — LEVOTHYROXINE SODIUM 75 MCG: 75 TABLET ORAL at 06:11

## 2024-03-19 RX ADMIN — ACETAMINOPHEN 650 MG: 325 TABLET ORAL at 20:58

## 2024-03-19 RX ADMIN — SENNOSIDES 8.6 MG: 8.6 TABLET, FILM COATED ORAL at 20:59

## 2024-03-19 RX ADMIN — PIPERACILLIN AND TAZOBACTAM 3375 MG: 3; .375 INJECTION, POWDER, FOR SOLUTION INTRAVENOUS; PARENTERAL at 14:18

## 2024-03-19 RX ADMIN — SODIUM CHLORIDE, SODIUM GLUCONATE, SODIUM ACETATE, POTASSIUM CHLORIDE AND MAGNESIUM CHLORIDE 100 ML/HR: 526; 502; 368; 37; 30 INJECTION, SOLUTION INTRAVENOUS at 07:00

## 2024-03-19 RX ADMIN — SODIUM CHLORIDE, PRESERVATIVE FREE 10 ML: 5 INJECTION INTRAVENOUS at 23:50

## 2024-03-19 RX ADMIN — TRAZODONE HYDROCHLORIDE 150 MG: 100 TABLET ORAL at 20:58

## 2024-03-19 ASSESSMENT — PAIN DESCRIPTION - PAIN TYPE: TYPE: ACUTE PAIN

## 2024-03-19 ASSESSMENT — PAIN SCALES - GENERAL
PAINLEVEL_OUTOF10: 0
PAINLEVEL_OUTOF10: 0
PAINLEVEL_OUTOF10: 4
PAINLEVEL_OUTOF10: 6

## 2024-03-19 ASSESSMENT — ENCOUNTER SYMPTOMS
SHORTNESS OF BREATH: 0
ABDOMINAL PAIN: 0

## 2024-03-19 ASSESSMENT — PAIN DESCRIPTION - ORIENTATION: ORIENTATION: RIGHT;LEFT

## 2024-03-19 ASSESSMENT — PAIN DESCRIPTION - FREQUENCY: FREQUENCY: CONTINUOUS

## 2024-03-19 ASSESSMENT — PAIN DESCRIPTION - ONSET: ONSET: ON-GOING

## 2024-03-19 ASSESSMENT — PAIN DESCRIPTION - DESCRIPTORS: DESCRIPTORS: DISCOMFORT

## 2024-03-19 ASSESSMENT — PAIN DESCRIPTION - LOCATION
LOCATION: LEG
LOCATION: BACK

## 2024-03-19 ASSESSMENT — PAIN - FUNCTIONAL ASSESSMENT: PAIN_FUNCTIONAL_ASSESSMENT: ACTIVITIES ARE NOT PREVENTED

## 2024-03-19 NOTE — PROCEDURES
INSTRUMENTAL SWALLOW REPORT  MODIFIED BARIUM SWALLOW  Speech Therapy Discharge Note    NAME: Tushar Block     : 1940  MRN: [id number]       Date of Eval: 3/19/2024     Ordering Physician: Dr. Jimenez  Referring Diagnosis: Dysphagia    Past Medical History:  has a past medical history of BPH (benign prostatic hyperplasia), C. difficile colitis, Carotid stenosis, Chronic back pain, Encounter for imaging to screen for metal prior to MRI, GERD (gastroesophageal reflux disease), History of atrial fibrillation, Hypertension, Lower GI bleed, MDRO (multiple drug resistant organisms) resistance, Neuromuscular disorder (HCC), Renal insufficiency, Septic arthritis of interphalangeal joint of toe of right foot (HCC), Systolic congestive heart failure (HCC), and Vitamin B12 deficiency.  Past Surgical History:  has a past surgical history that includes back surgery (); Foot surgery; Coronary artery bypass graft (2013); hip surgery (Right, 2015); Cystoscopy (N/A, 1/10/2019); Upper gastrointestinal endoscopy (N/A, 2020); sigmoidoscopy (N/A, 2020); Leg Surgery (Right, 2021); Foot Debridement (Left, 2022); Foot Debridement (Right, 2022); Toe amputation (Right, 2022); Upper gastrointestinal endoscopy (N/A, 2022); and bronchoscopy (N/A, 2024).    Recent CT Chest: 2024  1.  Noncontrast CT with 8 x 4 cm multilocular left lower lobe abscess versus  empyema.  2.  Extensive bilateral dependent consolidation and volume loss which may  represent a combination of atelectasis and pneumonia.  3.  Mild mediastinal lymphadenopathy is nonspecific but may be reactive.    Prior MBS Results: NA    Patient Complaints/Reason for Referral:  Tushar Block was referred for a MBS to assess the efficiency of his/her swallow function, assess for aspiration, and to make recommendations regarding safe dietary consistencies, effective compensatory strategies, and safe eating

## 2024-03-19 NOTE — PROGRESS NOTES
Speech Language Pathology  Facility/Department:[facility]   Dysphagia Evaluation    Name: Tushar Block  : 1940  MRN: [id number]                                                     Patient Diagnosis(es):   Patient Active Problem List    Diagnosis Date Noted    Normocytic anemia 2022    Electrolyte imbalance     Weight loss counseling, encounter for     Elevated sed rate     Elevated C-reactive protein (CRP)     Infection requiring contact isolation precautions     Class 1 obesity due to excess calories with body mass index (BMI) of 30.0 to 30.9 in adult     Chronic multifocal osteomyelitis of right foot (HCC) 2022    MRSA infection 2022    Septic arthritis of interphalangeal joint of toe of right foot ([facility]) 2022    Osteomyelitis (HCC) 2022    Multiple drug resistant organism (MDRO) culture positive 2022    Uremic encephalopathy 2024    Transient unconsciousness 2024    Venous stasis ulcer of right calf limited to breakdown of skin without varicose veins ([facility]) 2024    Sepsis ([facility]) 2024    Loculated pleural effusion 2024    Round atelectasis 2024    Sepsis secondary to UTI ([facility]) 2024    Symptomatic sinus bradycardia 2022    Bilateral leg edema 2022    Symptomatic bradycardia     Weakness 2022    Pain of right lower extremity     Degloving injury     Leg laceration, unspecified laterality, initial encounter 10/31/2021    Altered mental status     Hyperkalemia     Encephalopathy acute 2021    Acute renal failure superimposed on stage 3 chronic kidney disease ([facility]) 02/15/2021    Urinary tract infection associated with indwelling urethral catheter ([facility]) 02/15/2021    Acute cystitis without hematuria 2021    Abnormal angiogram 2020    H/O angiography 2020    Abnormal stress test 2020    Constipation 2020    Encopresis with constipation and overflow incontinence 2020

## 2024-03-19 NOTE — PLAN OF CARE
Problem: Discharge Planning  Goal: Discharge to home or other facility with appropriate resources  Outcome: Progressing  Flowsheets (Taken 3/19/2024 1851)  Discharge to home or other facility with appropriate resources:   Identify barriers to discharge with patient and caregiver   Arrange for needed discharge resources and transportation as appropriate   Identify discharge learning needs (meds, wound care, etc)     Problem: Skin/Tissue Integrity  Goal: Absence of new skin breakdown  Description: 1.  Monitor for areas of redness and/or skin breakdown  2.  Assess vascular access sites hourly  3.  Every 4-6 hours minimum:  Change oxygen saturation probe site  4.  Every 4-6 hours:  If on nasal continuous positive airway pressure, respiratory therapy assess nares and determine need for appliance change or resting period.  Outcome: Progressing     Problem: Safety - Adult  Goal: Free from fall injury  Outcome: Progressing  Flowsheets (Taken 3/19/2024 1851)  Free From Fall Injury: Instruct family/caregiver on patient safety     Problem: Pain  Goal: Verbalizes/displays adequate comfort level or baseline comfort level  Outcome: Progressing  Flowsheets (Taken 3/19/2024 1851)  Verbalizes/displays adequate comfort level or baseline comfort level:   Encourage patient to monitor pain and request assistance   Administer analgesics based on type and severity of pain and evaluate response   Assess pain using appropriate pain scale     Problem: Neurosensory - Adult  Goal: Achieves stable or improved neurological status  Outcome: Progressing  Flowsheets (Taken 3/19/2024 1851)  Achieves stable or improved neurological status:   Assess for and report changes in neurological status   Initiate measures to prevent increased intracranial pressure   Maintain blood pressure and fluid volume within ordered parameters to optimize cerebral perfusion and minimize risk of hemorrhage  Goal: Absence of seizures  Outcome: Progressing  Flowsheets

## 2024-03-19 NOTE — PROGRESS NOTES
Internal Medicine Progress Note    Date: 3/19/2024   Patient: Tushar Block   Hospital Day: 3      CC: Fever (Lethargic, not feeling well, history of sepsis. )     Interval Hx    Tushar Block is a 83 y.o. M admitted for septic shock on 3/16 and placed on broad spectrum abx. Initially admitted to ICU due to hypotension but did not require vasopressor support and was transferred to [facility] 3/17 for further management. Urine culture and BC with NGTD. Sepsis likely 2/2 PNA.     No acute events overnight. Pt is AO to self, place, year and situation. States he is here for an 'infection'. Denies pain, CP, SOB, Ab pain/N/V. Per pt, states he has not had a BM in 13 days. However, pt states he is hungry and tolerating PO intake well.    ROS   Review of Systems   Constitutional:  Appetite change: decreased.   Respiratory:  Negative for shortness of breath.    Cardiovascular:  Negative for chest pain.   Gastrointestinal:  Negative for abdominal pain.   Limited ROS as pt sleeps on exam.     Objective     Vital Signs:  Patient Vitals for the past 8 hrs:   BP Temp Temp src Pulse Resp SpO2 Weight   03/19/24 0845 (!) 162/73 98.1 °F (36.7 °C) Axillary 98 16 98 % --   03/19/24 0600 -- -- -- -- -- -- 97 kg (213 lb 13.5 oz)   03/19/24 0407 (!) 148/60 97.9 °F (36.6 °C) Oral (!) 102 17 96 % --         Physical Exam  Constitutional:       Comments: Elderly male. In no acute distress.    HENT:      Head: Normocephalic and atraumatic.   Eyes:      Conjunctiva/sclera: Conjunctivae normal.   Cardiovascular:      Rate and Rhythm: Normal rate and regular rhythm.      Heart sounds: Murmur (systolic) heard.   Pulmonary:      Breath sounds: No wheezing or rhonchi.      Comments: Mild crackles to R base.  Abdominal:      Palpations: Abdomen is soft.      Tenderness: There is no abdominal tenderness.   Musculoskeletal:      Right lower leg: No edema.      Left lower leg: No edema.   Skin:     General: Skin is warm and dry.   Neurological:

## 2024-03-19 NOTE — PROGRESS NOTES
Ph: [phone], Fax: [phone]                                    [facility]       Reason for admission:                 Altered mental status     Brief Summary :     Tushar Block is being seen by nephrology for ALIS.    Interval History and plan:     BP is better controlled now   Urine is 2400 ml  Creatinine is improving 2.2 > 1.6 > 1.2   BUN is 137 > 92 > 62    Continue IVF for another 24 hrs and then stop.  Encourage oral intake   Renal daily  On ABX        Assessment :     ALIS:  The patient presenting creatinine was 2.2  1 week earlier the patient's creatinine was 0.6  The patient's serum sodium level has also increased drastically from 126-139 in 7 days  The patient's BUN was 137 which is up from 12.  Therefore, dehydration is probably the underlying cause.     The patient's BUN elevation is also very disproportionate to the patient's creatinine rising which could be consistent with dehydration but the same time it could also be consistent with him slow upper GI bleeding.     US of the kidney with some complex cysts.     The patient had CT scan of his chest which showed 8 x 4 cm multilocular left lower lobe abscess versus empyema     On the left kidney there was a 6 mm nonobstructing kidney stones, no hydronephrosis or hydroureter.  The patient's urinary bladder was not distended.     Extensive bilateral dependent consolidation and volume loss which may represent a combination of atelectasis and pneumonia       Severe azotemia:  The patient's presenting BUN was 137 with a creatinine of 2.2  This could be consistent with severe dehydration, condition called prerenal renal failure  But on the other hand, this could also be consistent with slow upper GI bleeding     Anemia:   The patient's hemoglobin level was 9.6  Will check patient's iron store     Probable multilobular left lower lobe abscess versus empyema:  Antibiotics already including Rocephin, linezolid and

## 2024-03-19 NOTE — PLAN OF CARE
factors as ordered   Implement neutropenic guidelines     Problem: Metabolic/Fluid and Electrolytes - Adult  Goal: Electrolytes maintained within normal limits  3/19/2024 0222 by Amy Johnson, RN  Outcome: Progressing  3/18/2024 1327 by Romina Montgomery RN  Outcome: Progressing  Flowsheets (Taken 3/18/2024 1327)  Electrolytes maintained within normal limits:   Monitor labs and assess patient for signs and symptoms of electrolyte imbalances   Instruct patient on fluid and nutrition restrictions as appropriate   Monitor response to electrolyte replacements, including repeat lab results as appropriate   Administer electrolyte replacement as ordered   Fluid restriction as ordered  Goal: Hemodynamic stability and optimal renal function maintained  3/18/2024 1327 by Romina Montgomery, RN  Outcome: Progressing  Flowsheets (Taken 3/18/2024 1327)  Hemodynamic stability and optimal renal function maintained:   Monitor labs and assess for signs and symptoms of volume excess or deficit   Encourage oral intake as appropriate   Monitor intake, output and patient weight   Monitor response to interventions for patient's volume status, including labs, urine output, blood pressure (other measures as available)   Instruct patient on fluid and nutrition restrictions as appropriate  Goal: Glucose maintained within prescribed range  3/18/2024 1327 by Romina Montgomery, RN  Outcome: Progressing  Flowsheets (Taken 3/18/2024 1327)  Glucose maintained within prescribed range:   Monitor blood glucose as ordered   Administer ordered medications to maintain glucose within target range   Instruct patient on self management of diabetes and initiate consult as needed   Assess for signs and symptoms of hyperglycemia and hypoglycemia   Assess barriers to adequate nutritional intake and initiate nutrition consult as needed     Problem: Chronic Conditions and Co-morbidities  Goal: Patient's chronic conditions and co-morbidity symptoms are  monitored and maintained or improved  3/18/2024 1327 by Romina Montgomery, RN  Outcome: Progressing  Flowsheets (Taken 3/17/2024 0800 by Susanna Welch, RN)  Care Plan - Patient's Chronic Conditions and Co-Morbidity Symptoms are Monitored and Maintained or Improved:   Monitor and assess patient's chronic conditions and comorbid symptoms for stability, deterioration, or improvement   Collaborate with multidisciplinary team to address chronic and comorbid conditions and prevent exacerbation or deterioration   Update acute care plan with appropriate goals if chronic or comorbid symptoms are exacerbated and prevent overall improvement and discharge     Problem: Nutrition Deficit:  Goal: Optimize nutritional status  3/18/2024 1327 by Romina Montgomery, RN  Outcome: Progressing  Flowsheets (Taken 3/18/2024 1327)  Nutrient intake appropriate for improving, restoring, or maintaining nutritional needs:   Assess nutritional status and recommend course of action   Order, calculate, and assess calorie counts as needed   Monitor oral intake, labs, and treatment plans   Recommend, monitor, and adjust tube feedings and TPN/PPN based on assessed needs   Recommend appropriate diets, oral nutritional supplements, and vitamin/mineral supplements   Provide specific nutrition education to patient or family as appropriate

## 2024-03-20 ENCOUNTER — APPOINTMENT (OUTPATIENT)
Dept: GENERAL RADIOLOGY | Age: 84
End: 2024-03-20
Payer: MEDICARE

## 2024-03-20 LAB
ALBUMIN SERPL-MCNC: 2.4 G/DL (ref 3.4–5)
ANION GAP SERPL CALCULATED.3IONS-SCNC: 10 MMOL/L (ref 3–16)
BACTERIA BLD CULT ORG #2: NORMAL
BACTERIA BLD CULT: NORMAL
BASOPHILS # BLD: 0.1 K/UL (ref 0–0.2)
BASOPHILS NFR BLD: 0.8 %
BUN SERPL-MCNC: 40 MG/DL (ref 7–20)
CALCIUM SERPL-MCNC: 8.1 MG/DL (ref 8.3–10.6)
CHLORIDE SERPL-SCNC: 108 MMOL/L (ref 99–110)
CO2 SERPL-SCNC: 28 MMOL/L (ref 21–32)
CREAT SERPL-MCNC: 1 MG/DL (ref 0.8–1.3)
DEPRECATED RDW RBC AUTO: 21.3 % (ref 12.4–15.4)
EOSINOPHIL # BLD: 0.2 K/UL (ref 0–0.6)
EOSINOPHIL NFR BLD: 2.6 %
GFR SERPLBLD CREATININE-BSD FMLA CKD-EPI: >60 ML/MIN/{1.73_M2}
GLUCOSE SERPL-MCNC: 125 MG/DL (ref 70–99)
HCT VFR BLD AUTO: 32.3 % (ref 40.5–52.5)
HGB BLD-MCNC: 9.6 G/DL (ref 13.5–17.5)
LYMPHOCYTES # BLD: 1.7 K/UL (ref 1–5.1)
LYMPHOCYTES NFR BLD: 17.1 %
MAGNESIUM SERPL-MCNC: 2 MG/DL (ref 1.8–2.4)
MCH RBC QN AUTO: 29.2 PG (ref 26–34)
MCHC RBC AUTO-ENTMCNC: 29.6 G/DL (ref 31–36)
MCV RBC AUTO: 98.4 FL (ref 80–100)
MONOCYTES # BLD: 0.6 K/UL (ref 0–1.3)
MONOCYTES NFR BLD: 6.5 %
MRSA DNA SPEC QL NAA+PROBE: NORMAL
NEUTROPHILS # BLD: 7.1 K/UL (ref 1.7–7.7)
NEUTROPHILS NFR BLD: 73 %
PHOSPHATE SERPL-MCNC: 2.5 MG/DL (ref 2.5–4.9)
PLATELET # BLD AUTO: 208 K/UL (ref 135–450)
PMV BLD AUTO: 8.8 FL (ref 5–10.5)
POTASSIUM SERPL-SCNC: 4.5 MMOL/L (ref 3.5–5.1)
RBC # BLD AUTO: 3.28 M/UL (ref 4.2–5.9)
SODIUM SERPL-SCNC: 146 MMOL/L (ref 136–145)
WBC # BLD AUTO: 9.7 K/UL (ref 4–11)

## 2024-03-20 PROCEDURE — 71045 X-RAY EXAM CHEST 1 VIEW: CPT

## 2024-03-20 PROCEDURE — 36415 COLL VENOUS BLD VENIPUNCTURE: CPT

## 2024-03-20 PROCEDURE — 2060000000 HC ICU INTERMEDIATE R&B

## 2024-03-20 PROCEDURE — 6370000000 HC RX 637 (ALT 250 FOR IP)

## 2024-03-20 PROCEDURE — 6360000002 HC RX W HCPCS

## 2024-03-20 PROCEDURE — 87641 MR-STAPH DNA AMP PROBE: CPT

## 2024-03-20 PROCEDURE — 6370000000 HC RX 637 (ALT 250 FOR IP): Performed by: STUDENT IN AN ORGANIZED HEALTH CARE EDUCATION/TRAINING PROGRAM

## 2024-03-20 PROCEDURE — 83735 ASSAY OF MAGNESIUM: CPT

## 2024-03-20 PROCEDURE — 2580000003 HC RX 258

## 2024-03-20 PROCEDURE — 2580000003 HC RX 258: Performed by: STUDENT IN AN ORGANIZED HEALTH CARE EDUCATION/TRAINING PROGRAM

## 2024-03-20 PROCEDURE — 80069 RENAL FUNCTION PANEL: CPT

## 2024-03-20 PROCEDURE — 74018 RADEX ABDOMEN 1 VIEW: CPT

## 2024-03-20 PROCEDURE — 6360000002 HC RX W HCPCS: Performed by: STUDENT IN AN ORGANIZED HEALTH CARE EDUCATION/TRAINING PROGRAM

## 2024-03-20 PROCEDURE — 85025 COMPLETE CBC W/AUTO DIFF WBC: CPT

## 2024-03-20 RX ORDER — FUROSEMIDE 10 MG/ML
40 INJECTION INTRAMUSCULAR; INTRAVENOUS ONCE
Status: COMPLETED | OUTPATIENT
Start: 2024-03-20 | End: 2024-03-20

## 2024-03-20 RX ADMIN — ACETAMINOPHEN 650 MG: 325 TABLET ORAL at 08:40

## 2024-03-20 RX ADMIN — MICONAZOLE NITRATE: 20 POWDER TOPICAL at 08:40

## 2024-03-20 RX ADMIN — ENOXAPARIN SODIUM 40 MG: 100 INJECTION SUBCUTANEOUS at 08:40

## 2024-03-20 RX ADMIN — SODIUM CHLORIDE, PRESERVATIVE FREE 10 ML: 5 INJECTION INTRAVENOUS at 08:40

## 2024-03-20 RX ADMIN — PIPERACILLIN AND TAZOBACTAM 3375 MG: 3; .375 INJECTION, POWDER, FOR SOLUTION INTRAVENOUS; PARENTERAL at 20:45

## 2024-03-20 RX ADMIN — ATORVASTATIN CALCIUM 80 MG: 80 TABLET, FILM COATED ORAL at 20:29

## 2024-03-20 RX ADMIN — LEVOTHYROXINE SODIUM 75 MCG: 75 TABLET ORAL at 06:32

## 2024-03-20 RX ADMIN — SENNOSIDES 8.6 MG: 8.6 TABLET, FILM COATED ORAL at 20:29

## 2024-03-20 RX ADMIN — LINEZOLID 600 MG: 600 INJECTION, SOLUTION INTRAVENOUS at 06:35

## 2024-03-20 RX ADMIN — FUROSEMIDE 40 MG: 10 INJECTION, SOLUTION INTRAMUSCULAR; INTRAVENOUS at 12:17

## 2024-03-20 RX ADMIN — TRAZODONE HYDROCHLORIDE 150 MG: 100 TABLET ORAL at 20:29

## 2024-03-20 RX ADMIN — MICONAZOLE NITRATE: 20 POWDER TOPICAL at 23:50

## 2024-03-20 RX ADMIN — PIPERACILLIN AND TAZOBACTAM 3375 MG: 3; .375 INJECTION, POWDER, FOR SOLUTION INTRAVENOUS; PARENTERAL at 12:22

## 2024-03-20 RX ADMIN — POLYETHYLENE GLYCOL 3350 17 G: 17 POWDER, FOR SOLUTION ORAL at 08:40

## 2024-03-20 RX ADMIN — POLYETHYLENE GLYCOL 3350 17 G: 17 POWDER, FOR SOLUTION ORAL at 20:29

## 2024-03-20 RX ADMIN — LINEZOLID 600 MG: 600 INJECTION, SOLUTION INTRAVENOUS at 20:35

## 2024-03-20 RX ADMIN — PANTOPRAZOLE SODIUM 40 MG: 40 TABLET, DELAYED RELEASE ORAL at 06:32

## 2024-03-20 RX ADMIN — PIPERACILLIN AND TAZOBACTAM 3375 MG: 3; .375 INJECTION, POWDER, FOR SOLUTION INTRAVENOUS; PARENTERAL at 04:30

## 2024-03-20 RX ADMIN — SENNOSIDES 8.6 MG: 8.6 TABLET, FILM COATED ORAL at 08:41

## 2024-03-20 RX ADMIN — SODIUM CHLORIDE, PRESERVATIVE FREE 10 ML: 5 INJECTION INTRAVENOUS at 20:30

## 2024-03-20 ASSESSMENT — PAIN DESCRIPTION - ORIENTATION: ORIENTATION: RIGHT;LEFT

## 2024-03-20 ASSESSMENT — PAIN DESCRIPTION - FREQUENCY: FREQUENCY: CONTINUOUS

## 2024-03-20 ASSESSMENT — PAIN SCALES - GENERAL
PAINLEVEL_OUTOF10: 3
PAINLEVEL_OUTOF10: 0
PAINLEVEL_OUTOF10: 0

## 2024-03-20 ASSESSMENT — ENCOUNTER SYMPTOMS
SHORTNESS OF BREATH: 0
ABDOMINAL PAIN: 0

## 2024-03-20 ASSESSMENT — PAIN DESCRIPTION - DESCRIPTORS: DESCRIPTORS: DISCOMFORT

## 2024-03-20 ASSESSMENT — PAIN - FUNCTIONAL ASSESSMENT: PAIN_FUNCTIONAL_ASSESSMENT: ACTIVITIES ARE NOT PREVENTED

## 2024-03-20 ASSESSMENT — PAIN DESCRIPTION - ONSET: ONSET: ON-GOING

## 2024-03-20 ASSESSMENT — PAIN DESCRIPTION - LOCATION: LOCATION: FOOT

## 2024-03-20 ASSESSMENT — PAIN DESCRIPTION - PAIN TYPE: TYPE: ACUTE PAIN

## 2024-03-20 NOTE — PLAN OF CARE
Aid to Capacity Assessment  Is the patient:  1. Able to understand medical problem? Says Yes but unable to explain the medical problems  2. Able to understand proposed treatment? Says wants antibiotics as they are good.  3. Able to understand alternative to proposed treatment (if any)? Say he wants everything done to help save his life  4. Able to understanding option of refusing proposed treatment (including withholding or withdrawing proposed treatment)? Say he wants everything done to help save his life and says yes when asked about chest compressions/shocks/medications/mechanical ventilation  5. Able to appreciate reasonably foreseeable consequence of accepting proposed treatment? No  6. Able to appreciate reasonable foreseeable consequences of refusing proposed treatment (including withholding or withdrawing proposed treatment)? No  7a. Is the patient's decision affected by depression? 7b. Is the person's decision affected by delusions/psychosis? No     If the answer to any of the questions 1-6 is \"no\", the patient does not have capacity to consent to or refuse that treatment.   If the answer to #7 is yes, they may not have capacity to consent to or refuse that treatment.     Capacity is specific to a single question/proposed treatment. It is dynamic and should be continually reassessed by the primary team when the patient is asked to make a decision.     \"Competency\" is a broad determination of ability to make decisions in general, and is a legal determination made by a court (not a psychiatrist or other physician).

## 2024-03-20 NOTE — PROGRESS NOTES
Ph: [phone], Fax: [phone]                                    [facility]       Reason for admission:                 Altered mental status     Brief Summary :     Tushar Block is being seen by nephrology for ALIS.    Interval History and plan:     BP is better controlled now   Urine is 2375 ml. Iglesias is in place   Creatinine is improving 2.2 > 1.6 > 1.0  BUN is 137 > 92 > 62>40    DC IVF  Encourage oral intake   Renal daily  On ABX   Case was DW primary team        Assessment :     ALIS:  The patient presenting creatinine was 2.2  1 week earlier the patient's creatinine was 0.6  The patient's serum sodium level has also increased drastically from 126-139 in 7 days  The patient's BUN was 137 which is up from 12.  Therefore, dehydration is probably the underlying cause.     The patient's BUN elevation is also very disproportionate to the patient's creatinine rising which could be consistent with dehydration but the same time it could also be consistent with him slow upper GI bleeding.     US of the kidney with some complex cysts.     The patient had CT scan of his chest which showed 8 x 4 cm multilocular left lower lobe abscess versus empyema     On the left kidney there was a 6 mm nonobstructing kidney stones, no hydronephrosis or hydroureter.  The patient's urinary bladder was not distended.     Extensive bilateral dependent consolidation and volume loss which may represent a combination of atelectasis and pneumonia       Severe azotemia:  The patient's presenting BUN was 137 with a creatinine of 2.2  This could be consistent with severe dehydration, condition called prerenal renal failure  But on the other hand, this could also be consistent with slow upper GI bleeding     Anemia:   The patient's hemoglobin level was 9.6  Will check patient's iron store     Probable multilobular left lower lobe abscess versus empyema:  Antibiotics already including Rocephin, linezolid

## 2024-03-20 NOTE — PLAN OF CARE
Problem: Discharge Planning  Goal: Discharge to home or other facility with appropriate resources  Outcome: Progressing  Flowsheets (Taken 3/20/2024 1633)  Discharge to home or other facility with appropriate resources:   Identify barriers to discharge with patient and caregiver   Arrange for needed discharge resources and transportation as appropriate   Identify discharge learning needs (meds, wound care, etc)     Problem: Skin/Tissue Integrity  Goal: Absence of new skin breakdown  Description: 1.  Monitor for areas of redness and/or skin breakdown  2.  Assess vascular access sites hourly  3.  Every 4-6 hours minimum:  Change oxygen saturation probe site  4.  Every 4-6 hours:  If on nasal continuous positive airway pressure, respiratory therapy assess nares and determine need for appliance change or resting period.  Outcome: Progressing     Problem: Safety - Adult  Goal: Free from fall injury  Outcome: Progressing  Flowsheets (Taken 3/20/2024 1633)  Free From Fall Injury: Instruct family/caregiver on patient safety     Problem: Pain  Goal: Verbalizes/displays adequate comfort level or baseline comfort level  Outcome: Progressing  Flowsheets (Taken 3/20/2024 1633)  Verbalizes/displays adequate comfort level or baseline comfort level:   Encourage patient to monitor pain and request assistance   Assess pain using appropriate pain scale   Administer analgesics based on type and severity of pain and evaluate response     Problem: Neurosensory - Adult  Goal: Achieves stable or improved neurological status  Outcome: Progressing  Flowsheets (Taken 3/20/2024 1633)  Achieves stable or improved neurological status: Assess for and report changes in neurological status     Problem: Cardiovascular - Adult  Goal: Maintains optimal cardiac output and hemodynamic stability  Outcome: Progressing  Flowsheets (Taken 3/20/2024 1633)  Maintains optimal cardiac output and hemodynamic stability:   Monitor blood pressure and heart rate

## 2024-03-20 NOTE — CARE COORDINATION
Pt/family had meeting with LATOSHA and hospice today- discussing code status and hospice tonight, plan to decide tomorrow morning and likely DC back to facility with hospice tomorrow. CM will f/u in the morning.    Thank you  Shae Vail RN, BSN, CM  U   [phone]

## 2024-03-20 NOTE — PROGRESS NOTES
Palliative Care Chart Review  and Check in Note:     NAME:  Tushar Block  Admit Date: 3/16/2024  Hospital Day:  Hospital Day: 5   Current Code status: Full Code    Palliative care is continuing to following Mr. Block for symptom management,  and goals of care discussion as needed. Patient's chart reviewed today 3/20/24.      Saw pt at the bedside. He is resting comfortably in bed with eyes closed.    D/w [facility] RN Susanna, pt's son is meeting with [facility] this afternoon at 2pm to discuss his plan of care moving forward. D/w CM RN Cat.     Pt's HCPOA obtained from [facility].       The following are the currently established goals/code status, and Symptom management.     Goals of care:  Plan to continue with current management at this time. Pt's son Wilmer will consider change to DNRCC if pt does not improve in the coming days.      Code status: Full code    Discharge plan: Likely return to [facility] with hospice.        TIM Coleman - CNP  03/20/24  10:18 AM

## 2024-03-20 NOTE — PROGRESS NOTES
Internal Medicine Progress Note    Date: 3/20/2024   Patient: Tushar Block   Hospital Day: 4      CC: Fever (Lethargic, not feeling well, history of sepsis. )     Interval Hx   Tushar Block is a 83 y.o. M admitted for septic shock on 3/16 and placed on broad spectrum abx. Initially admitted to ICU due to hypotension but did not require vasopressor support and was transferred to [facility] 3/17 for further management. Urine culture and BC with NGTD. Sepsis likely 2/2 PNA.     No acute events overnight. Vitals stable. Pt reports that he feels fatigued today. Visibly looks sleepy. Physical exam with mild edema of LE. Diminished breath sounds with faint crackles to lungs. Per nurse, still no bowel movement. On auscultation pt has hyperactive bowel sounds. Labs show improvement of Cr to 1.0 back to baseline. BUN at 40. Hypernatremia to 146 this AM. Leukocytosis has resolved. Remains on Zosyn and Linezolid.     CXR with new L pleural effusion.     KUB pending.     ROS   Review of Systems   Constitutional:  Appetite change: decreased.   Respiratory:  Negative for shortness of breath.    Cardiovascular:  Negative for chest pain.   Gastrointestinal:  Negative for abdominal pain.   Limited ROS as pt sleeps on exam.     Objective     Vital Signs:  Patient Vitals for the past 8 hrs:   BP Temp Temp src Pulse Resp   03/20/24 0400 (!) 119/44 98 °F (36.7 °C) Oral 61 18         Physical Exam  Constitutional:       Comments: Elderly male. In no acute distress. Sleepy   HENT:      Head: Normocephalic and atraumatic.   Eyes:      Conjunctiva/sclera: Conjunctivae normal.   Cardiovascular:      Rate and Rhythm: Normal rate and regular rhythm.      Heart sounds: Murmur (systolic) heard.   Pulmonary:      Breath sounds: No wheezing or rhonchi.      Comments: Mild crackles to R base.  Abdominal:      Palpations: Abdomen is soft.      Tenderness: There is no abdominal tenderness.   Musculoskeletal:      Right lower leg: Edema (trace)  right lung base      Electronically signed by MD Nathaniel Rangel            Assessment & Plan   Mr Block is an 84 yo M with a PMHx of CAD s/p CABG, chronic indwelling schneider catheter, pAfib, GERD, and muscle spasticity w/ intrathecal Baclofen pump who presented from [facility] via EMS for AMS.     Sepsis likely 2/2 PNA - improving  Recently hospitalized for UTI which grew pansensitive Proteus mirabilis. Treated w/ 7 days meropenem and vancomycin. 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 Previous cultures have grown MDR Marganella and Enterococcus.   - Ucx - NGTD  - BC -  NGTD   - Zosyn + Zyvox (3/17-) for coverage of aspiration PNA  - Leukocytosis resolved  - Likely will require longer treatment course  - SLP eval placed - recommend soft and bite sized; tolerating so far  - Repeat CXR 3/20 showed new L pleural effusion   - Lasix 40 IV one time dose     ALIS 2/2 prerenal vs intrarenal - resolved  Severe azotemia - improving  Baseline Cr 0.7-1.0. Cr on admission 2.2.  consistent with prerenal etiology v intrarenal since patient has indwelling catheter.   - nephrology on board, appreciate recs   - S/p IVF with plasmalyte    - Net +5L  - Kanu 56 and Ucr 26  - Renal US 3/18 - Hypoechoic lesions to R kidney possibly mildly complex cysts. L renal calculi. No hydronephrosis.   - Daily BMP - azotemia improving, Cr back to baseline of 1.0    Hypernatremia  Na of 146 this AM up from 142 yesterday. Free water deficit of 2L to correct to 140.   - Nephrology on board  - Holding off on IVF for now    Constipation  Pt states he has not had a BM in 13 days. Given his questionable mental status, uncertain if this information is true. His abdomen is distended, but non-tender. On soft/bite sized diet and tolerating PO without pain, N/V.  - Schedule Miralax/Senna BID  - KUB ordered    Metabolic encephalopathy - improving  Currently AAOx3. Responding appropriately after occasional repeated

## 2024-03-21 VITALS
DIASTOLIC BLOOD PRESSURE: 65 MMHG | TEMPERATURE: 98 F | RESPIRATION RATE: 20 BRPM | BODY MASS INDEX: 28.55 KG/M2 | HEART RATE: 93 BPM | WEIGHT: 210.76 LBS | HEIGHT: 72 IN | SYSTOLIC BLOOD PRESSURE: 154 MMHG | OXYGEN SATURATION: 98 %

## 2024-03-21 LAB
ALBUMIN SERPL-MCNC: 2.7 G/DL (ref 3.4–5)
ANION GAP SERPL CALCULATED.3IONS-SCNC: 6 MMOL/L (ref 3–16)
BASOPHILS # BLD: 0.1 K/UL (ref 0–0.2)
BASOPHILS NFR BLD: 0.9 %
BUN SERPL-MCNC: 30 MG/DL (ref 7–20)
CALCIUM SERPL-MCNC: 8.1 MG/DL (ref 8.3–10.6)
CHLORIDE SERPL-SCNC: 104 MMOL/L (ref 99–110)
CO2 SERPL-SCNC: 33 MMOL/L (ref 21–32)
CREAT SERPL-MCNC: 0.8 MG/DL (ref 0.8–1.3)
DEPRECATED RDW RBC AUTO: 18.6 % (ref 12.4–15.4)
EOSINOPHIL # BLD: 0.2 K/UL (ref 0–0.6)
EOSINOPHIL NFR BLD: 2.7 %
GFR SERPLBLD CREATININE-BSD FMLA CKD-EPI: >60 ML/MIN/{1.73_M2}
GLUCOSE SERPL-MCNC: 99 MG/DL (ref 70–99)
HCT VFR BLD AUTO: 29.6 % (ref 40.5–52.5)
HGB BLD-MCNC: 9.3 G/DL (ref 13.5–17.5)
LYMPHOCYTES # BLD: 1.3 K/UL (ref 1–5.1)
LYMPHOCYTES NFR BLD: 15.2 %
MAGNESIUM SERPL-MCNC: 1.8 MG/DL (ref 1.8–2.4)
MCH RBC QN AUTO: 27.7 PG (ref 26–34)
MCHC RBC AUTO-ENTMCNC: 31.4 G/DL (ref 31–36)
MCV RBC AUTO: 88.2 FL (ref 80–100)
MONOCYTES # BLD: 0.4 K/UL (ref 0–1.3)
MONOCYTES NFR BLD: 5.3 %
NEUTROPHILS # BLD: 6.4 K/UL (ref 1.7–7.7)
NEUTROPHILS NFR BLD: 75.9 %
PHOSPHATE SERPL-MCNC: 3.2 MG/DL (ref 2.5–4.9)
PLATELET # BLD AUTO: 208 K/UL (ref 135–450)
PMV BLD AUTO: 8.3 FL (ref 5–10.5)
POTASSIUM SERPL-SCNC: 4.2 MMOL/L (ref 3.5–5.1)
RBC # BLD AUTO: 3.36 M/UL (ref 4.2–5.9)
SODIUM SERPL-SCNC: 143 MMOL/L (ref 136–145)
WBC # BLD AUTO: 8.5 K/UL (ref 4–11)

## 2024-03-21 PROCEDURE — 80069 RENAL FUNCTION PANEL: CPT

## 2024-03-21 PROCEDURE — 36415 COLL VENOUS BLD VENIPUNCTURE: CPT

## 2024-03-21 PROCEDURE — 85025 COMPLETE CBC W/AUTO DIFF WBC: CPT

## 2024-03-21 PROCEDURE — 6360000002 HC RX W HCPCS

## 2024-03-21 PROCEDURE — 6370000000 HC RX 637 (ALT 250 FOR IP): Performed by: STUDENT IN AN ORGANIZED HEALTH CARE EDUCATION/TRAINING PROGRAM

## 2024-03-21 PROCEDURE — 6370000000 HC RX 637 (ALT 250 FOR IP)

## 2024-03-21 PROCEDURE — 2580000003 HC RX 258

## 2024-03-21 PROCEDURE — 83735 ASSAY OF MAGNESIUM: CPT

## 2024-03-21 PROCEDURE — 6360000002 HC RX W HCPCS: Performed by: STUDENT IN AN ORGANIZED HEALTH CARE EDUCATION/TRAINING PROGRAM

## 2024-03-21 RX ORDER — AMOXICILLIN AND CLAVULANATE POTASSIUM 875; 125 MG/1; MG/1
1 TABLET, FILM COATED ORAL EVERY 12 HOURS SCHEDULED
Qty: 5 TABLET | Refills: 0 | Status: SHIPPED | OUTPATIENT
Start: 2024-03-21 | End: 2024-03-24

## 2024-03-21 RX ORDER — AMOXICILLIN AND CLAVULANATE POTASSIUM 875; 125 MG/1; MG/1
1 TABLET, FILM COATED ORAL EVERY 12 HOURS SCHEDULED
Status: DISCONTINUED | OUTPATIENT
Start: 2024-03-21 | End: 2024-03-21 | Stop reason: HOSPADM

## 2024-03-21 RX ADMIN — ENOXAPARIN SODIUM 40 MG: 100 INJECTION SUBCUTANEOUS at 09:46

## 2024-03-21 RX ADMIN — MICONAZOLE NITRATE: 20 POWDER TOPICAL at 09:46

## 2024-03-21 RX ADMIN — PIPERACILLIN AND TAZOBACTAM 3375 MG: 3; .375 INJECTION, POWDER, FOR SOLUTION INTRAVENOUS; PARENTERAL at 05:44

## 2024-03-21 RX ADMIN — AMOXICILLIN AND CLAVULANATE POTASSIUM 1 TABLET: 875; 125 TABLET, FILM COATED ORAL at 11:42

## 2024-03-21 RX ADMIN — LEVOTHYROXINE SODIUM 75 MCG: 75 TABLET ORAL at 05:46

## 2024-03-21 RX ADMIN — SENNOSIDES 8.6 MG: 8.6 TABLET, FILM COATED ORAL at 09:46

## 2024-03-21 RX ADMIN — POLYETHYLENE GLYCOL 3350 17 G: 17 POWDER, FOR SOLUTION ORAL at 09:46

## 2024-03-21 RX ADMIN — PANTOPRAZOLE SODIUM 40 MG: 40 TABLET, DELAYED RELEASE ORAL at 05:46

## 2024-03-21 ASSESSMENT — ENCOUNTER SYMPTOMS
ABDOMINAL PAIN: 0
SHORTNESS OF BREATH: 0

## 2024-03-21 ASSESSMENT — PAIN DESCRIPTION - ORIENTATION
ORIENTATION: RIGHT;LEFT
ORIENTATION: RIGHT;LEFT

## 2024-03-21 ASSESSMENT — PAIN DESCRIPTION - FREQUENCY
FREQUENCY: CONTINUOUS
FREQUENCY: CONTINUOUS

## 2024-03-21 ASSESSMENT — PAIN SCALES - WONG BAKER
WONGBAKER_NUMERICALRESPONSE: NO HURT
WONGBAKER_NUMERICALRESPONSE: NO HURT

## 2024-03-21 ASSESSMENT — PAIN DESCRIPTION - PAIN TYPE
TYPE: ACUTE PAIN;CHRONIC PAIN
TYPE: ACUTE PAIN;CHRONIC PAIN

## 2024-03-21 ASSESSMENT — PAIN DESCRIPTION - DESCRIPTORS
DESCRIPTORS: ACHING;DISCOMFORT
DESCRIPTORS: ACHING;DISCOMFORT

## 2024-03-21 ASSESSMENT — PAIN SCALES - GENERAL
PAINLEVEL_OUTOF10: 0

## 2024-03-21 ASSESSMENT — PAIN DESCRIPTION - ONSET
ONSET: ON-GOING
ONSET: ON-GOING

## 2024-03-21 ASSESSMENT — PAIN DESCRIPTION - LOCATION
LOCATION: FOOT
LOCATION: FOOT

## 2024-03-21 NOTE — PROGRESS NOTES
Ph: [phone], Fax: [phone]                                    [facility]       Reason for admission:                 Altered mental status     Brief Summary :     Tushar Block is being seen by nephrology for ALIS.    Interval History and plan:     BP is better controlled now   Urine is 2500 ml. Iglesias is in place   Creatinine is improving 2.2 > 1.6 > 1.0  BUN is 137 > 92 > 62>40  Labs pending     DC IVF  Encourage oral intake   Renal daily  On ABX   DC Flomax   Case was DW primary team        Assessment :     ALIS:  The patient presenting creatinine was 2.2  1 week earlier the patient's creatinine was 0.6  The patient's serum sodium level has also increased drastically from 126-139 in 7 days  The patient's BUN was 137 which is up from 12.  Therefore, dehydration is probably the underlying cause.     The patient's BUN elevation is also very disproportionate to the patient's creatinine rising which could be consistent with dehydration but the same time it could also be consistent with him slow upper GI bleeding.     US of the kidney with some complex cysts.     The patient had CT scan of his chest which showed 8 x 4 cm multilocular left lower lobe abscess versus empyema     On the left kidney there was a 6 mm nonobstructing kidney stones, no hydronephrosis or hydroureter.  The patient's urinary bladder was not distended.     Extensive bilateral dependent consolidation and volume loss which may represent a combination of atelectasis and pneumonia       Severe azotemia:  The patient's presenting BUN was 137 with a creatinine of 2.2  This could be consistent with severe dehydration, condition called prerenal renal failure  But on the other hand, this could also be consistent with slow upper GI bleeding     Anemia:   The patient's hemoglobin level was 9.6  Will check patient's iron store     Probable multilobular left lower lobe abscess versus empyema:  Antibiotics already  including Rocephin, linezolid and Zosyn     Nephrolithiasis:  6 mm nonobstructing stones without evidence of hydronephrosis or hydroureter  There is no need for intervention at this moment     Please call with questions at-  24 hrs Answering service [phone]   7 am-5 pm at [employer], or cell phone  Dr Wesly Kim MD      HPI:     Tushar Block is a 83 y.o. male  with  has a past medical history of BPH (benign prostatic hyperplasia), C. difficile colitis, Carotid stenosis, Chronic back pain, Encounter for imaging to screen for metal prior to MRI, GERD (gastroesophageal reflux disease), History of atrial fibrillation, Hypertension, Lower GI bleed, MDRO (multiple drug resistant organisms) resistance, Neuromuscular disorder (HCC), Renal insufficiency, Septic arthritis of interphalangeal joint of toe of right foot (HCC), Systolic congestive heart failure (HCC), and Vitamin B12 deficiency. who presented with altered mental status, hypoxia, increased work of breathing.  This occurred over the past 24 hours prior to this presentation.  The patient was resident of a nursing facility.  In the emergency room, the patient was not able to tell why he was here.       Per report, at the nursing facility the patient was oriented x 3, conversant and able to feed himself.     He has history of CAD, status post CABG, paroxysmal atrial fibrillation (not on systemic anticoagulation due to prior GI bleeding episode), hyperlipidemia chronic indwelling Iglesias catheter, apparently for obstructive uropathy with MDRO and MDSA infection before.  Per report, the patient was under hospice care but for some reason now the patient is in full CODE STATUS.     Upon arrival, the patient's temperature was 100.3 with blood pressure 103/49 and the patient blood pressure dropped rapidly to the 70s with the lowest reported blood pressure of 74/41.  On routine lab, he was found to have sodium 139 potassium 4.8 bicarb 29  creatinine

## 2024-03-21 NOTE — DISCHARGE SUMMARY
INTERNAL MEDICINE DEPARTMENT  DISCHARGE SUMMARY    Patient ID: Tushar Block                                             Discharge Date: 3/21/2024   Patient's PCP: Mario Alberto Tran                                          Discharge Physician: Ksenia Hoover DO   Admit Date: 3/16/2024   Admitting Physician: Christian Jay MD    PROBLEMS DURING HOSPITALIZATION:  Present on Admission:   Uremic encephalopathy      DISCHARGE DIAGNOSES:  Sepsis  Suspected aspiration PNA  ALIS   Acute on chronic anemia     Hospital Course:  5 days     Mr Block is an 84 yo M with a PMHx of CAD s/p CABG, chronic indwelling schneider catheter, pAfib, GERD, and muscle spasticity (on Baclofen pump) who presented from [facility] with AMS on 3/16/24. Per his nurse, he began to appear more lethargic and confused. He was found to hypotensive with a SBP in the 80s, and hypoxic with SpO2 of 84% requiring 4L O2 support. The facility transported him to [facility] due to the hypoxia.     In the ED, patient was alert and oriented to person, place, and time. Hypotensive at 87/50. SpO2 100% on 4L O2. BMP significant for BUN of 137, Cr 2.2. Pro BNP elevated to 2,070. Trop elevated at 307, repeat 341. CBC significant for WBC of 16.2, Hgb 9.6. CXR was read as no acute abnormality, however appears to show possibly developing pneumonia. Patient received 2L of LR, as well as 1L of plasmalyte. Also received 1 dose of ceftriaxone in ED. Pt was admitted to the [facility] for workup and management of sepsis due to suspected PNA and ALIS.      The following issues were addressed during hospitalization:    Sepsis likely 2/2 PNA - Pt initially hypotensive with leukocytosis and new oxygen requirements. Initially suspected pt may have had a UTI given UA with large leukocyte esterase and WBC. 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 Previous cultures have grown MDR Marganella and Enterococcus. However, asymptomatic and urine showed

## 2024-03-21 NOTE — PROGRESS NOTES
Internal Medicine Progress Note    Date: 3/21/2024   Patient: Tushar Block   [facility] Day: 5      CC: Fever (Lethargic, not feeling well, history of sepsis. )     Interval Hx     NAOE. Vitals stable. Afebrile. Physical exam with mild edema of LE. Diminished breath sounds exam. Pt with incentive spirometry at bedside. Reports no abdominal pain. Has been able to move bowels.     Per hospice RN, facility willing to accept pt while full code. Son is to speak with siblings to change patient to DNR CC eventually. Per assessment yesterday pt does not possess capacity to make his own decisions.     Pt is clinically stable for discharge. He is currently on zosyn --> will switch this to Augmentin for coverage of anearobes for 2 more days.     ROS   Review of Systems   Constitutional:  Appetite change: decreased.   Respiratory:  Negative for shortness of breath.    Cardiovascular:  Negative for chest pain.   Gastrointestinal:  Negative for abdominal pain.   Limited ROS per patient baseline altered mental status    Objective     Vital Signs:  Patient Vitals for the past 8 hrs:   BP Temp Temp src Pulse Resp SpO2 Weight   03/21/24 0717 (!) 145/74 98 °F (36.7 °C) Oral 93 20 98 % --   03/21/24 0528 -- 98.4 °F (36.9 °C) Oral -- 20 -- 95.6 kg (210 lb 12.2 oz)   03/21/24 0400 -- -- -- (!) 102 -- -- --       Physical Exam  Constitutional:       Comments: Elderly male. In no acute distress.    HENT:      Head: Normocephalic and atraumatic.   Eyes:      Conjunctiva/sclera: Conjunctivae normal.   Cardiovascular:      Rate and Rhythm: Normal rate and regular rhythm.      Heart sounds: Murmur (systolic) heard.   Pulmonary:      Breath sounds: No wheezing or rhonchi.      Comments: Diminished breath sounds  Abdominal:      Palpations: Abdomen is soft.      Tenderness: There is no abdominal tenderness.   Musculoskeletal:      Right lower leg: Edema (trace) present.      Left lower leg: Edema (trace) present.   Skin:     General: Skin is   Left nephrolithiasis.   3.  Cholelithiasis.         XR CHEST PORTABLE   Final Result   1. Status post CABG with early congestive heart failure   2. Atelectatic changes right lung base      Electronically signed by MD Nathaniel Rangel            Assessment & Plan   Mr Block is an 84 yo M with a PMHx of CAD s/p CABG, chronic indwelling schneider catheter, pAfib, GERD, and muscle spasticity w/ intrathecal Baclofen pump who presented from [facility] via EMS for AMS.     Sepsis likely 2/2 PNA - resolving  Recently hospitalized for UTI which grew pansensitive Proteus mirabilis. Treated w/ 7 days meropenem and vancomycin. 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 Previous cultures have grown MDR Marganella and Enterococcus.   - Ucx - NGTD  - BC -  NGTD   - D/c Linezolid (3/17-3/20) - MRSA neg  - D/c Zosyn (3/17-3/21)  - Initiate Augmentin for coverage of aspiration PNA for 2 more days   - SLP eval placed - recommend soft and bite sized; tolerating so far  - Repeat CXR 3/20 showed new L pleural effusion   - Lasix 40 IV one time dose given yesterday and pt tolerated well       ALIS 2/2 prerenal vs intrarenal - resolved  Severe azotemia - improving  Baseline Cr 0.7-1.0. Cr on admission 2.2.  consistent with prerenal etiology v intrarenal since patient has indwelling catheter.   - nephrology on board, appreciate recs   - S/p IVF with plasmalyte    - Net +5L  - Kanu 56 and Ucr 26  - Renal US 3/18 - Hypoechoic lesions to R kidney possibly mildly complex cysts. L renal calculi. No hydronephrosis.   - Daily BMP - azotemia improving, Cr back to baseline of 0.8      Hypernatremia- improving  Na of 143 this AM up from 146 yesterday.   - Nephrology on board  - Holding off on IVF for now      Constipation - resolved  On soft/bite sized diet and tolerating PO without pain, N/V. KUB 3/20 - non obstructive bowel gas pattern. Pt now passing stool.   - Schedule Miralax/Senna BID      Metabolic

## 2024-03-21 NOTE — DISCHARGE INSTR - COC
performed by Fauzia Richardson DPM at [facility] OR    UPPER GASTROINTESTINAL ENDOSCOPY N/A 5/4/2020    EGD BIOPSY performed by Rex VALENTINE MD at [facility] ENDOSCOPY    UPPER GASTROINTESTINAL ENDOSCOPY N/A 9/5/2022    EGD BIOPSY performed by Rex VALENTINE MD at [facility] ENDOSCOPY       Immunization History:   Immunization History   Administered Date(s) Administered    COVID-19, MODERNA BLUE border, Primary or Immunocompromised, (age 12y+), IM, 100 mcg/0.5mL 05/17/2021    COVID-19, PFIZER Bivalent, DO NOT Dilute, (age 12y+), IM, 30 mcg/0.3 mL 11/11/2022    COVID-19, PFIZER, (2023-24 formula), (age 12y+), IM, 30mcg/0.3mL 11/13/2023    Influenza A (R8W7-93) Vaccine PF IM 12/10/2009    Influenza Vaccine, unspecified formulation 12/13/2012, 11/06/2014, 10/13/2015, 09/01/2016, 11/03/2017, 10/26/2018, 09/20/2019    Influenza Virus Vaccine 12/19/2005, 12/14/2006, 12/13/2012, 11/30/2013, 10/13/2015, 09/20/2019    Influenza Whole 10/01/2007, 09/02/2010, 09/01/2011, 12/13/2012, 10/13/2015    Influenza, FLUARIX, FLULAVAL, FLUZONE (age 6 mo+) AND AFLURIA, (age 3 y+), PF, 0.5mL 11/04/2021    Influenza, High Dose (Fluzone 65 yrs and older) 11/06/2014, 10/20/2020    PPD Test 09/16/1997    Pneumococcal Conjugate 7-valent (Prevnar7) 12/29/2014    Pneumococcal, PCV-13, PREVNAR 13, (age 6w+), IM, 0.5mL 12/29/2014    Pneumococcal, PPSV23, PNEUMOVAX 23, (age 2y+), SC/IM, 0.5mL 01/27/2014       Active Problems:  Patient Active Problem List   Diagnosis Code    Hypotension I95.9    Light headed R42    Cellulitis L03.90    Essential hypertension I10    GERD (gastroesophageal reflux disease) K21.9    Acute blood loss anemia D62    Tinea corporis B35.4    Carotid stenosis I65.29    CAD (coronary artery disease) I25.10    S/P CABG 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XXXA    Complicated UTI (urinary tract  Status/Restrictions: { CC Weight Bearin}  Other Medical Equipment (for information only, NOT a DME order):  {EQUIPMENT:[id number]}  Other Treatments: ***    Patient's personal belongings (please select all that are sent with patient):  {CHP DME Belongings:845767101}    RN SIGNATURE:  {Esignature:[id number]}    CASE MANAGEMENT/SOCIAL WORK SECTION    Inpatient Status Date: 3/16/24    Readmission Risk Assessment Score:  Readmission Risk              Risk of Unplanned Readmission:  32           Discharging to Facility/ Agency   Name:   Address:  Phone:  Fax:    Dialysis Facility (if applicable)   Name:  Address:  Dialysis Schedule:  Phone:  Fax:    / signature: {Esignature:[id number]}    PHYSICIAN SECTION    Prognosis: Fair    Condition at Discharge: Stable    Rehab Potential (if transferring to Rehab): Guarded    Recommended Labs or Other Treatments After Discharge:   - Will continue Augmentin for 2 more days - with last dose in evening of 3/23.   - Apply antifungal cream to penis as indicated      Physician Certification: I certify the above information and transfer of Tushar Block  is necessary for the continuing treatment of the diagnosis listed and that he requires Hospice for greater 30 days.     Update Admission H&P: No change in H&P    PHYSICIAN SIGNATURE:  Electronically signed by Ksenia Hoover DO on 3/21/24 at 10:35 AM EDT

## 2024-03-21 NOTE — PLAN OF CARE
Problem: Discharge Planning  Goal: Discharge to home or other facility with appropriate resources  3/21/2024 0238 by Krzysztof Singer RN  Outcome: Progressing  Flowsheets (Taken 3/21/2024 0238)  Discharge to home or other facility with appropriate resources:   Identify barriers to discharge with patient and caregiver   Identify discharge learning needs (meds, wound care, etc)   Arrange for needed discharge resources and transportation as appropriate   Refer to discharge planning if patient needs post-hospital services based on physician order or complex needs related to functional status, cognitive ability or social support system     Problem: Skin/Tissue Integrity  Goal: Absence of new skin breakdown  Description: 1.  Monitor for areas of redness and/or skin breakdown  2.  Assess vascular access sites hourly  3.  Every 4-6 hours minimum:  Change oxygen saturation probe site  4.  Every 4-6 hours:  If on nasal continuous positive airway pressure, respiratory therapy assess nares and determine need for appliance change or resting period.  3/21/2024 0238 by Krzysztof Singer RN  Outcome: Progressing     Problem: Safety - Adult  Goal: Free from fall injury  3/21/2024 0238 by Krzysztof Singer RN  Outcome: Progressing  Flowsheets (Taken 3/21/2024 0238)  Free From Fall Injury: Instruct family/caregiver on patient safety  Note: Fall protocol in  place      Problem: Pain  Goal: Verbalizes/displays adequate comfort level or baseline comfort level  3/21/2024 0238 by Krzysztof Singer RN  Outcome: Progressing  Flowsheets (Taken 3/21/2024 0238)  Verbalizes/displays adequate comfort level or baseline comfort level:   Encourage patient to monitor pain and request assistance   Assess pain using appropriate pain scale   Administer analgesics based on type and severity of pain and evaluate response   Implement non-pharmacological measures as appropriate and evaluate response

## 2024-03-21 NOTE — PROGRESS NOTES
Palliative Medicine Progress Note    Admit Date: 3/16/2024  Hospital Day:  Hospital Day: 6     CC: AMS   HPI: Tushar Block is a 83 y.o. male with PMH of CAD s/p CABG, chronic indwelling schneider catheter, pAfib, GERD  who presented with altered mental status from [facility]. At baseline, the patient feeds himself, has appropriate conversation, and is AxO x3. The day prior to presentation he started having difficulty feeding himself and seemed more confused. On the day of presentation he was found to be lethargic, hypotensive and hypoxic. He was transported tot he ED. In the ED BMP significant for BUN of 137, Cr 2.2. Pro BNP elevated to 2,070. Trop elevated at 307, repeat 341. CBC significant for WBC of 16.2, Hgb 9.6. CXR showed possible developing PNA. He was admitted and transferred to the ICU due to concern for hypotension and possible pressors, however he did not require pressors and was transferred to the floor.      D/w Dr. Hoover and Susanna, ALPHONSO with [facility]. Dr. Jimenez performed Aid to Capacity Evaluation 3/20 and determined that pt does not have capacity to make decisions about his code status. [facility] is willing to accept pt back whether he is a full code or DNRCC. Pt's son Wilmer met with [facility] yesterday and wanted to discuss pt's code status with his siblings again prior to rendering a decision. I called Wilmer this morning and left a VM.     Recommendations:     1. Goals of Care/Advanced Care planning/Code status:Full code, pending further discussion with pt's son/HCPOA Wilmer.   2. Pain: pt denied  3. SOB: pt denied, currently requiring 3LNC.   4. Sepsis likely 2/2 PNA: concern for possible aspiration PNA, SLP eval placed, will follow up pending results.   5. AMS: Pt is drowsy but oriented to self, time and place. Of note last admission pt's mentation did wax and wane.   6. Disposition: Likely return to [facility] with hospice.    Subjective:     Scheduled Meds:              Assessment:     Principal Problem:    Uremic encephalopathy  Resolved Problems:    * No resolved hospital problems. *      Time spent with patient and/or family: 10  Time reviewing records: 5  Time communicating with providers: 10    A total of 25 minutes spent with the patient and family on unit greater than 50% face to face time in counseling regarding palliative care and goals of care for the patient.       Adelia Chand APRN  Inpatient Palliative Care  [phone]

## 2024-03-21 NOTE — CARE COORDINATION
Case Management Assessment            Discharge Note                    Date / Time of Note: 3/21/2024 12:09 PM                  Discharge Note Completed by: January Vail    Patient Name: Tushar Block   YOB: 1940  Diagnosis: Uremia [N19]  Uremic encephalopathy [G93.49, N19]  ALIS (acute kidney injury) (HCC) [N17.9]  Altered mental status, unspecified altered mental status type [R41.82]   Date / Time: 3/16/2024 10:48 AM    Current PCP: Mario Alberto Tran  Clinic patient: No    Hospitalization in the last 30 days: Yes  Readmission Assessment  Number of Days since last admission?: 1-7 days  Previous Disposition: Other (comment) (AL with hospice)  Who is being Interviewed: Patient  What was the patient's/caregiver's perception as to why they think they needed to return back to the hospital?: Other (Comment) (new symptoms: fever, lethargy)  Did you visit your Primary Care Physician after you left the hospital, before you returned this time?: No  Why weren't you able to visit your PCP?: Other (Comment) (DC to [place] with hospice)  Did you see a specialist, such as Cardiac, Pulmonary, Orthopedic Physician, etc. after you left the hospital?: Other (Comment) (hospice provider)  Who advised the patient to return to the hospital?: Self-referral, Caregiver (self/AL)  Does the patient report anything that got in the way of taking their medications?: No  In our efforts to provide the best possible care to you and others like you, can you think of anything that we could have done to help you after you left the hospital the first time, so that you might not have needed to return so soon?: Other (Comment) (declines)    Advance Directives:  Code Status: Full Code  [place] DNR form completed and on chart: Not Indicated    Financial:  Payor: HU[employer]NA MEDICARE / Plan: HU[employer]NA GOLD PLUS HMO / Product Type: *No Product type* /      Pharmacy:    [facility] - [facility] [address] - [place]

## 2024-04-09 ENCOUNTER — APPOINTMENT (OUTPATIENT)
Dept: CT IMAGING | Age: 84
End: 2024-04-09
Payer: MEDICARE

## 2024-04-09 ENCOUNTER — APPOINTMENT (OUTPATIENT)
Dept: GENERAL RADIOLOGY | Age: 84
End: 2024-04-09
Payer: MEDICARE

## 2024-04-09 ENCOUNTER — HOSPITAL ENCOUNTER (INPATIENT)
Age: 84
LOS: 3 days | Discharge: NURSING, REHAB OR LONG-TERM CARE | End: 2024-04-12
Attending: EMERGENCY MEDICINE | Admitting: INTERNAL MEDICINE
Payer: MEDICARE

## 2024-04-09 DIAGNOSIS — J18.9 PNEUMONIA OF RIGHT LOWER LOBE DUE TO INFECTIOUS ORGANISM: ICD-10-CM

## 2024-04-09 DIAGNOSIS — A41.9 SEPSIS WITHOUT ACUTE ORGAN DYSFUNCTION, DUE TO UNSPECIFIED ORGANISM (HCC): ICD-10-CM

## 2024-04-09 DIAGNOSIS — J96.01 ACUTE HYPOXEMIC RESPIRATORY FAILURE (HCC): Primary | ICD-10-CM

## 2024-04-09 LAB
ALBUMIN SERPL-MCNC: 3.1 G/DL (ref 3.4–5)
ALBUMIN/GLOB SERPL: 0.6 {RATIO} (ref 1.1–2.2)
ALP SERPL-CCNC: 15 U/L (ref 40–129)
ALT SERPL-CCNC: 8 U/L (ref 10–40)
ANION GAP SERPL CALCULATED.3IONS-SCNC: 10 MMOL/L (ref 3–16)
ANION GAP SERPL CALCULATED.3IONS-SCNC: 9 MMOL/L (ref 3–16)
AST SERPL-CCNC: 14 U/L (ref 15–37)
BASE EXCESS BLDA CALC-SCNC: -0.1 MMOL/L (ref -3–3)
BASE EXCESS BLDV CALC-SCNC: 1.5 MMOL/L (ref -2–3)
BASE EXCESS BLDV CALC-SCNC: 1.5 MMOL/L (ref -2–3)
BASE EXCESS BLDV CALC-SCNC: 3.7 MMOL/L (ref -2–3)
BASE EXCESS BLDV CALC-SCNC: 5 MMOL/L (ref -2–3)
BASOPHILS # BLD: 0.1 K/UL (ref 0–0.2)
BASOPHILS NFR BLD: 0.7 %
BILIRUB SERPL-MCNC: 0.3 MG/DL (ref 0–1)
BUN SERPL-MCNC: 83 MG/DL (ref 7–20)
BUN SERPL-MCNC: 85 MG/DL (ref 7–20)
CALCIUM SERPL-MCNC: 8.3 MG/DL (ref 8.3–10.6)
CALCIUM SERPL-MCNC: 8.4 MG/DL (ref 8.3–10.6)
CHLORIDE SERPL-SCNC: 103 MMOL/L (ref 99–110)
CHLORIDE SERPL-SCNC: 103 MMOL/L (ref 99–110)
CO2 BLDA-SCNC: 33 MMOL/L
CO2 BLDV-SCNC: 34 MMOL/L
CO2 BLDV-SCNC: 35 MMOL/L
CO2 BLDV-SCNC: 36 MMOL/L
CO2 BLDV-SCNC: 36 MMOL/L
CO2 SERPL-SCNC: 29 MMOL/L (ref 21–32)
CO2 SERPL-SCNC: 30 MMOL/L (ref 21–32)
COHGB MFR BLDA: 0.8 % (ref 0–1.5)
COHGB MFR BLDV: 1.1 % (ref 0–1.5)
COHGB MFR BLDV: 1.1 % (ref 0–1.5)
COHGB MFR BLDV: 1.3 % (ref 0–1.5)
COHGB MFR BLDV: 1.4 % (ref 0–1.5)
CREAT SERPL-MCNC: 1.2 MG/DL (ref 0.8–1.3)
CREAT SERPL-MCNC: 1.2 MG/DL (ref 0.8–1.3)
DEPRECATED RDW RBC AUTO: 18.9 % (ref 12.4–15.4)
DO-HGB MFR BLDV: 11 %
DO-HGB MFR BLDV: 4.2 %
DO-HGB MFR BLDV: 4.2 %
DO-HGB MFR BLDV: 4.7 %
EKG ATRIAL RATE: 98 BPM
EKG DIAGNOSIS: NORMAL
EKG P AXIS: 37 DEGREES
EKG P-R INTERVAL: 208 MS
EKG Q-T INTERVAL: 332 MS
EKG QRS DURATION: 76 MS
EKG QTC CALCULATION (BAZETT): 423 MS
EKG R AXIS: 17 DEGREES
EKG T AXIS: 56 DEGREES
EKG VENTRICULAR RATE: 98 BPM
EOSINOPHIL # BLD: 0.7 K/UL (ref 0–0.6)
EOSINOPHIL NFR BLD: 6 %
FLUAV RNA RESP QL NAA+PROBE: NOT DETECTED
FLUBV RNA RESP QL NAA+PROBE: NOT DETECTED
GFR SERPLBLD CREATININE-BSD FMLA CKD-EPI: 60 ML/MIN/{1.73_M2}
GFR SERPLBLD CREATININE-BSD FMLA CKD-EPI: 60 ML/MIN/{1.73_M2}
GLUCOSE BLD-MCNC: 211 MG/DL (ref 70–99)
GLUCOSE SERPL-MCNC: 130 MG/DL (ref 70–99)
GLUCOSE SERPL-MCNC: 219 MG/DL (ref 70–99)
HCO3 BLDA-SCNC: 31 MMOL/L (ref 21–29)
HCO3 BLDV-SCNC: 31.2 MMOL/L (ref 24–28)
HCO3 BLDV-SCNC: 32 MMOL/L (ref 24–28)
HCO3 BLDV-SCNC: 33.4 MMOL/L (ref 24–28)
HCO3 BLDV-SCNC: 33.7 MMOL/L (ref 24–28)
HCT VFR BLD AUTO: 29.8 % (ref 40.5–52.5)
HGB BLD-MCNC: 9.2 G/DL (ref 13.5–17.5)
HGB BLDA-MCNC: 15.7 G/DL
LACTATE BLDV-SCNC: 0.6 MMOL/L (ref 0.4–1.9)
LACTATE BLDV-SCNC: 2 MMOL/L (ref 0.4–2)
LYMPHOCYTES # BLD: 2.3 K/UL (ref 1–5.1)
LYMPHOCYTES NFR BLD: 18.9 %
MCH RBC QN AUTO: 28.1 PG (ref 26–34)
MCHC RBC AUTO-ENTMCNC: 31 G/DL (ref 31–36)
MCV RBC AUTO: 90.7 FL (ref 80–100)
METHGB MFR BLDA: 0.2 % (ref 0–1.4)
METHGB MFR BLDV: 0 % (ref 0–1.5)
METHGB MFR BLDV: 0 % (ref 0–1.5)
METHGB MFR BLDV: 0.1 % (ref 0–1.5)
METHGB MFR BLDV: 0.2 % (ref 0–1.5)
MONOCYTES # BLD: 0.7 K/UL (ref 0–1.3)
MONOCYTES NFR BLD: 5.8 %
NEUTROPHILS # BLD: 8.4 K/UL (ref 1.7–7.7)
NEUTROPHILS NFR BLD: 68.6 %
NT-PROBNP SERPL-MCNC: 1015 PG/ML (ref 0–449)
PCO2 BLDA: 80.3 MMHG (ref 35–45)
PCO2 BLDV: 76.6 MMHG (ref 41–51)
PCO2 BLDV: 83.2 MMHG (ref 41–51)
PCO2 BLDV: 86.9 MMHG (ref 41–51)
PCO2 BLDV: 92.4 MMHG (ref 41–51)
PERFORMED ON: ABNORMAL
PH BLDA: 7.19 [PH] (ref 7.35–7.45)
PH BLDV: 7.15 [PH] (ref 7.35–7.45)
PH BLDV: 7.18 [PH] (ref 7.35–7.45)
PH BLDV: 7.19 [PH] (ref 7.35–7.45)
PH BLDV: 7.25 [PH] (ref 7.35–7.45)
PLATELET # BLD AUTO: 346 K/UL (ref 135–450)
PMV BLD AUTO: 8.3 FL (ref 5–10.5)
PO2 BLDA: 124 MMHG (ref 75–108)
PO2 BLDV: 68.3 MMHG (ref 25–40)
PO2 BLDV: 88.2 MMHG (ref 25–40)
PO2 BLDV: 88.9 MMHG (ref 25–40)
PO2 BLDV: 94.4 MMHG (ref 25–40)
POTASSIUM SERPL-SCNC: 5 MMOL/L (ref 3.5–5.1)
POTASSIUM SERPL-SCNC: 5.3 MMOL/L (ref 3.5–5.1)
PROCALCITONIN SERPL IA-MCNC: 0.22 NG/ML (ref 0–0.15)
PROT SERPL-MCNC: 8.1 G/DL (ref 6.4–8.2)
RBC # BLD AUTO: 3.29 M/UL (ref 4.2–5.9)
SAO2 % BLDA: 98 % (ref 93–100)
SAO2 % BLDV: 89 %
SAO2 % BLDV: 95 %
SAO2 % BLDV: 96 %
SAO2 % BLDV: 96 %
SARS-COV-2 RNA RESP QL NAA+PROBE: NOT DETECTED
SODIUM SERPL-SCNC: 141 MMOL/L (ref 136–145)
SODIUM SERPL-SCNC: 143 MMOL/L (ref 136–145)
TROPONIN, HIGH SENSITIVITY: 194 NG/L (ref 0–22)
TROPONIN, HIGH SENSITIVITY: 231 NG/L (ref 0–22)
TROPONIN, HIGH SENSITIVITY: 235 NG/L (ref 0–22)
WBC # BLD AUTO: 12.2 K/UL (ref 4–11)

## 2024-04-09 PROCEDURE — 96366 THER/PROPH/DIAG IV INF ADDON: CPT

## 2024-04-09 PROCEDURE — 6360000002 HC RX W HCPCS: Performed by: EMERGENCY MEDICINE

## 2024-04-09 PROCEDURE — 80053 COMPREHEN METABOLIC PANEL: CPT

## 2024-04-09 PROCEDURE — 36600 WITHDRAWAL OF ARTERIAL BLOOD: CPT

## 2024-04-09 PROCEDURE — 87636 SARSCOV2 & INF A&B AMP PRB: CPT

## 2024-04-09 PROCEDURE — 84134 ASSAY OF PREALBUMIN: CPT

## 2024-04-09 PROCEDURE — 87040 BLOOD CULTURE FOR BACTERIA: CPT

## 2024-04-09 PROCEDURE — 36415 COLL VENOUS BLD VENIPUNCTURE: CPT

## 2024-04-09 PROCEDURE — 6370000000 HC RX 637 (ALT 250 FOR IP): Performed by: EMERGENCY MEDICINE

## 2024-04-09 PROCEDURE — 2700000000 HC OXYGEN THERAPY PER DAY

## 2024-04-09 PROCEDURE — 2580000003 HC RX 258: Performed by: EMERGENCY MEDICINE

## 2024-04-09 PROCEDURE — 83880 ASSAY OF NATRIURETIC PEPTIDE: CPT

## 2024-04-09 PROCEDURE — 71250 CT THORAX DX C-: CPT

## 2024-04-09 PROCEDURE — 94761 N-INVAS EAR/PLS OXIMETRY MLT: CPT

## 2024-04-09 PROCEDURE — 94660 CPAP INITIATION&MGMT: CPT

## 2024-04-09 PROCEDURE — 82803 BLOOD GASES ANY COMBINATION: CPT

## 2024-04-09 PROCEDURE — 5A09357 ASSISTANCE WITH RESPIRATORY VENTILATION, LESS THAN 24 CONSECUTIVE HOURS, CONTINUOUS POSITIVE AIRWAY PRESSURE: ICD-10-PCS | Performed by: INTERNAL MEDICINE

## 2024-04-09 PROCEDURE — 94640 AIRWAY INHALATION TREATMENT: CPT

## 2024-04-09 PROCEDURE — 96365 THER/PROPH/DIAG IV INF INIT: CPT

## 2024-04-09 PROCEDURE — 96367 TX/PROPH/DG ADDL SEQ IV INF: CPT

## 2024-04-09 PROCEDURE — 71045 X-RAY EXAM CHEST 1 VIEW: CPT

## 2024-04-09 PROCEDURE — 85025 COMPLETE CBC W/AUTO DIFF WBC: CPT

## 2024-04-09 PROCEDURE — 83605 ASSAY OF LACTIC ACID: CPT

## 2024-04-09 PROCEDURE — 99285 EMERGENCY DEPT VISIT HI MDM: CPT

## 2024-04-09 PROCEDURE — 2060000000 HC ICU INTERMEDIATE R&B

## 2024-04-09 PROCEDURE — 84484 ASSAY OF TROPONIN QUANT: CPT

## 2024-04-09 PROCEDURE — 87641 MR-STAPH DNA AMP PROBE: CPT

## 2024-04-09 PROCEDURE — 1200000000 HC SEMI PRIVATE

## 2024-04-09 PROCEDURE — 93005 ELECTROCARDIOGRAM TRACING: CPT | Performed by: EMERGENCY MEDICINE

## 2024-04-09 PROCEDURE — 84145 PROCALCITONIN (PCT): CPT

## 2024-04-09 RX ORDER — SODIUM CHLORIDE 0.9 % (FLUSH) 0.9 %
5-40 SYRINGE (ML) INJECTION PRN
Status: DISCONTINUED | OUTPATIENT
Start: 2024-04-09 | End: 2024-04-12 | Stop reason: HOSPADM

## 2024-04-09 RX ORDER — IPRATROPIUM BROMIDE AND ALBUTEROL SULFATE 2.5; .5 MG/3ML; MG/3ML
1 SOLUTION RESPIRATORY (INHALATION)
Status: DISCONTINUED | OUTPATIENT
Start: 2024-04-09 | End: 2024-04-10

## 2024-04-09 RX ORDER — SODIUM CHLORIDE 9 MG/ML
INJECTION, SOLUTION INTRAVENOUS PRN
Status: DISCONTINUED | OUTPATIENT
Start: 2024-04-09 | End: 2024-04-12 | Stop reason: HOSPADM

## 2024-04-09 RX ORDER — ONDANSETRON 4 MG/1
4 TABLET, ORALLY DISINTEGRATING ORAL EVERY 8 HOURS PRN
Status: DISCONTINUED | OUTPATIENT
Start: 2024-04-09 | End: 2024-04-12 | Stop reason: HOSPADM

## 2024-04-09 RX ORDER — ACETAMINOPHEN 650 MG/1
650 SUPPOSITORY RECTAL EVERY 6 HOURS PRN
Status: DISCONTINUED | OUTPATIENT
Start: 2024-04-09 | End: 2024-04-12 | Stop reason: HOSPADM

## 2024-04-09 RX ORDER — POLYETHYLENE GLYCOL 3350 17 G/17G
17 POWDER, FOR SOLUTION ORAL DAILY PRN
Status: DISCONTINUED | OUTPATIENT
Start: 2024-04-09 | End: 2024-04-12 | Stop reason: HOSPADM

## 2024-04-09 RX ORDER — ALBUTEROL SULFATE 2.5 MG/3ML
2.5 SOLUTION RESPIRATORY (INHALATION) EVERY 4 HOURS PRN
Status: DISCONTINUED | OUTPATIENT
Start: 2024-04-09 | End: 2024-04-12 | Stop reason: HOSPADM

## 2024-04-09 RX ORDER — ARFORMOTEROL TARTRATE 15 UG/2ML
15 SOLUTION RESPIRATORY (INHALATION)
Status: DISCONTINUED | OUTPATIENT
Start: 2024-04-09 | End: 2024-04-12 | Stop reason: HOSPADM

## 2024-04-09 RX ORDER — OXYCODONE HYDROCHLORIDE AND ACETAMINOPHEN 5; 325 MG/1; MG/1
1 TABLET ORAL EVERY 6 HOURS PRN
COMMUNITY

## 2024-04-09 RX ORDER — SODIUM CHLORIDE 0.9 % (FLUSH) 0.9 %
5-40 SYRINGE (ML) INJECTION EVERY 12 HOURS SCHEDULED
Status: DISCONTINUED | OUTPATIENT
Start: 2024-04-09 | End: 2024-04-12 | Stop reason: HOSPADM

## 2024-04-09 RX ORDER — FUROSEMIDE 10 MG/ML
40 INJECTION INTRAMUSCULAR; INTRAVENOUS ONCE
Status: DISCONTINUED | OUTPATIENT
Start: 2024-04-09 | End: 2024-04-09

## 2024-04-09 RX ORDER — MENTHOL AND ZINC OXIDE .44; 20.625 G/100G; G/100G
OINTMENT TOPICAL
COMMUNITY

## 2024-04-09 RX ORDER — TAMSULOSIN HYDROCHLORIDE 0.4 MG/1
0.4 CAPSULE ORAL DAILY
COMMUNITY

## 2024-04-09 RX ORDER — ACETAMINOPHEN 325 MG/1
650 TABLET ORAL EVERY 6 HOURS PRN
Status: DISCONTINUED | OUTPATIENT
Start: 2024-04-09 | End: 2024-04-12 | Stop reason: HOSPADM

## 2024-04-09 RX ORDER — ENOXAPARIN SODIUM 100 MG/ML
40 INJECTION SUBCUTANEOUS DAILY
Status: DISCONTINUED | OUTPATIENT
Start: 2024-04-09 | End: 2024-04-12 | Stop reason: HOSPADM

## 2024-04-09 RX ORDER — LOPERAMIDE HYDROCHLORIDE 2 MG/1
2 CAPSULE ORAL 4 TIMES DAILY PRN
COMMUNITY

## 2024-04-09 RX ORDER — GUAIFENESIN 600 MG/1
600 TABLET, EXTENDED RELEASE ORAL 2 TIMES DAILY
COMMUNITY

## 2024-04-09 RX ORDER — BUDESONIDE 0.25 MG/2ML
0.25 INHALANT ORAL
Status: DISCONTINUED | OUTPATIENT
Start: 2024-04-09 | End: 2024-04-12 | Stop reason: HOSPADM

## 2024-04-09 RX ORDER — ONDANSETRON 2 MG/ML
4 INJECTION INTRAMUSCULAR; INTRAVENOUS EVERY 6 HOURS PRN
Status: DISCONTINUED | OUTPATIENT
Start: 2024-04-09 | End: 2024-04-12 | Stop reason: HOSPADM

## 2024-04-09 RX ORDER — SODIUM CHLORIDE 9 MG/ML
INJECTION, SOLUTION INTRAVENOUS CONTINUOUS
Status: DISCONTINUED | OUTPATIENT
Start: 2024-04-09 | End: 2024-04-09

## 2024-04-09 RX ADMIN — IPRATROPIUM BROMIDE AND ALBUTEROL SULFATE 1 DOSE: 2.5; .5 SOLUTION RESPIRATORY (INHALATION) at 16:12

## 2024-04-09 RX ADMIN — ALBUTEROL SULFATE 2.5 MG: 2.5 SOLUTION RESPIRATORY (INHALATION) at 16:12

## 2024-04-09 RX ADMIN — VANCOMYCIN HYDROCHLORIDE 2000 MG: 10 INJECTION, POWDER, LYOPHILIZED, FOR SOLUTION INTRAVENOUS at 15:03

## 2024-04-09 RX ADMIN — IPRATROPIUM BROMIDE AND ALBUTEROL SULFATE 1 DOSE: 2.5; .5 SOLUTION RESPIRATORY (INHALATION) at 20:07

## 2024-04-09 RX ADMIN — PIPERACILLIN AND TAZOBACTAM 4500 MG: 4; .5 INJECTION, POWDER, LYOPHILIZED, FOR SOLUTION INTRAVENOUS at 14:28

## 2024-04-09 ASSESSMENT — ENCOUNTER SYMPTOMS: SHORTNESS OF BREATH: 1

## 2024-04-09 ASSESSMENT — PAIN SCALES - GENERAL: PAINLEVEL_OUTOF10: 0

## 2024-04-09 NOTE — RT PROTOCOL NOTE
RT Nebulizer Bronchodilator Protocol Note    There is a bronchodilator order in the chart from a provider indicating to follow the RT Bronchodilator Protocol and there is an “Initiate RT Bronchodilator Protocol” order as well (see protocol at bottom of note).    CXR Findings:  XR CHEST PORTABLE    Result Date: 4/9/2024  1.  Pulmonary opacities are present in the right lung concerning for pneumonia.      The findings from the last RT Protocol Assessment were as follows:  Smoking: Smoker 15 pack years or more  Respiratory Pattern: Mild dyspnea at rest, irregular pattern, or RR 21-25 bpm  Breath Sounds: Inspiratory and expiratory or bilateral wheezing and/or rhonchi  Cough: Unable to generate effective cough  Indication for Bronchodilator Therapy:    Bronchodilator Assessment Score: 15    Aerosolized bronchodilator medication orders have been revised according to the RT Nebulizer Bronchodilator Protocol below.    Respiratory Therapist to perform RT Therapy Protocol Assessment initially then follow the protocol.  Repeat RT Therapy Protocol Assessment PRN for score 0-3 or on second treatment, BID, and PRN for scores above 3.    No Indications - adjust the frequency to every 6 hours PRN wheezing or bronchospasm, if no treatments needed after 48 hours then discontinue using Per Protocol order mode.     If indication present, adjust the RT bronchodilator orders based on the Bronchodilator Assessment Score as indicated below.  If a patient is on this medication at home then do not decrease Frequency below that used at home.    0-3 - enter or revise RT bronchodilator order(s) to equivalent RT Bronchodilator order with Frequency of every 4 hours PRN for wheezing or increased work of breathing using Per Protocol order mode.       4-6 - enter or revise RT Bronchodilator order(s) to two equivalent RT bronchodilator orders with one order with BID Frequency and one order with Frequency of every 4 hours PRN wheezing or increased

## 2024-04-09 NOTE — PROGRESS NOTES
Patient placed on BIPAP   04/09/24 1803   NIV Type   $NIV $Daily Charge   Ventilator ID trilogy   Suction Setup and Functional Yes   NIV Started/Stopped On   Equipment Type trilogy   Mode Bilevel   Mask Type Full face mask   Mask Size Large   Bonnet size Large   Assessment   Pulse 96   Respirations 22   SpO2 100 %   Level of Consciousness 2   Comfort Level Good   Using Accessory Muscles Yes   Mask Compliance Good   Skin Assessment Skin breakdown present (see comment/note)  (Small scab/wound on outer nose/cheek, left side. ED RN aware. Mepalex put in place where BIPAP would come in contact with wound.)   Settings/Measurements   PIP Observed 18 cm H20   IPAP 18 cmH20   CPAP/EPAP 8 cmH2O   Vt (Measured) 623 mL   Rate Ordered 16   Insp Rise Time (%) 0.8 %   FiO2  75 %   I Time/ I Time % 0.8 s   Minute Volume (L/min) 11 Liters   Mask Leak (lpm) 34 lpm   Patient's Home Machine No   Alarm Settings   Alarms On Y   Apnea (secs) 20 secs   RR Low (bpm) 10   RR High (bpm) 40 br/min   Patient Observation   Observations Increased WOB, no gag reflux/unable to generate effective cough

## 2024-04-09 NOTE — ED NOTES
ED TO INPATIENT SBAR HANDOFF    Patient Name: Tushar Block   :  1940  83 y.o.   MRN:  [id number]  Preferred Name    ED Room #:  A11/A11-11  Family/Caregiver Present no   Restraints no   Sitter no   Sepsis Risk Score Sepsis Risk Score: 18.45    Situation  Code Status: Limited No additional code details.    Allergies: Bactrim [sulfamethoxazole-trimethoprim]  Weight: Patient Vitals for the past 96 hrs (Last 3 readings):   Weight   24 1400 91.5 kg (201 lb 12.8 oz)     Arrived from: [facility]; [place]  Chief Complaint:   Chief Complaint   Patient presents with    Altered Mental Status    Shortness of Breath     Pt. Presents to ED via EMS from [place] with c/o AMS and SOB.     Hospital Problem/Diagnosis:  Principal Problem:    Acute hypoxemic respiratory failure (HCC)  Resolved Problems:    * No resolved hospital problems. *    Imaging:   XR CHEST PORTABLE   Final Result   1.  Pulmonary opacities are present in the right lung concerning for pneumonia.      CT CHEST WO CONTRAST    (Results Pending)     Abnormal labs:   Abnormal Labs Reviewed   CBC WITH AUTO DIFFERENTIAL - Abnormal; Notable for the following components:       Result Value    WBC 12.2 (*)     RBC 3.29 (*)     Hemoglobin 9.2 (*)     Hematocrit 29.8 (*)     RDW 18.9 (*)     Neutrophils Absolute 8.4 (*)     Eosinophils Absolute 0.7 (*)     All other components within normal limits   COMPREHENSIVE METABOLIC PANEL W/ REFLEX TO MG FOR LOW K - Abnormal; Notable for the following components:    Potassium reflex Magnesium 5.3 (*)     Glucose 130 (*)     BUN 85 (*)     Est, Glom Filt Rate 60 (*)     Albumin 3.1 (*)     Albumin/Globulin Ratio 0.6 (*)     Alkaline Phosphatase 15 (*)     ALT 8 (*)     AST 14 (*)     All other components within normal limits    Narrative:     CALL  [place]  [facility] tel. [phone],  Previous panic on this admission - call not needed per SOP, 2024 15:15,  by ROBB   BLOOD GAS, VENOUS - Abnormal; Notable for the

## 2024-04-09 NOTE — H&P
is an 84 yo M with a PMHx of CAD s/p CABG, chronic indwelling schneider catheter, pAfib, GERD, and muscle spasticity (on Baclofen pump) who presented to ED on 4/9 for AMS and SOB.        Sepsis possibly 2/2 PNA  Acute hypercapnic respiratory failure 2/2 PNA vs HFpEF exacerbation  Recently hospitalized for PNA likely 2/2 aspiration. Presents again for SOB and AMS. On arrival pt is in respiratory distress requiring vapotherm. CXR c/w RLL PNA. Meets sepsis criteria - tachypneic, tachycardic, leukocytosis with suspected source of infection. S/p vanc and zosyn in ED.   - Broad spectrum abx   - Zosyn for coverage of aspiration PNA + Vanc  - continue bipap   - Duonebs and albuterol PRN  - Repeat blood gas with ABG now and in one 1 hour  - CT chest w/o contrast pending   - Infectious workup:   - BC X2 pending  - UCx pending  - procal pending  - MRSA nares  - Resp panel - diatherix pending  - Strep pneumo ag and legionella urine ag pending  - UA with reflex to culture   - NPO for now as pt high risk for aspiration   - LA pending   - Will get BNP  - Monitor on telemetry      Acute metabolic encephalopathy 2/2 Azotemia vs hypercapnia vs infection  Obtunded on presentation. Suspect AMS is multifactorial and due to azotemia from dehydration as well as hypercapnia.   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  - No acute action at this time     CAD s/p CABG - continue ASA 81     HTN - holding home antihypertensives      pAF - telemetry      HLD - continue home Lipitor 80     Hypothyroidism - continue home synthroid    Acute on chronic anemia likely ACD - stable  No signs of acute blood loss. Stable.   - Daily

## 2024-04-09 NOTE — PROGRESS NOTES
Patient placed of non-heated high flow nasal cannula, 10L   04/09/24 1418   Oxygen Therapy/Pulse Ox   O2 Therapy (S)  Oxygen humidified   $Oxygen $Daily Charge   O2 Device High flow nasal cannula   O2 Flow Rate (L/min) (S)  10 L/min   Pulse 91   Respirations 16   SpO2 97 %   Pulse Oximeter Device Mode Continuous   Pulse Oximeter Device Location Finger   $Pulse Oximeter $Spot check (multiple/continuous)

## 2024-04-09 NOTE — ED PROVIDER NOTES
THE [facility]  EMERGENCY DEPARTMENT ENCOUNTER          ATTENDING PHYSICIAN NOTE       Date of evaluation: 4/9/2024    ADDENDUM:      Care of this patient was assumed from Dr. Zavala.  The patient was seen for Altered Mental Status and Shortness of Breath (Pt. Presents to ED via EMS from [place] with c/o AMS and SOB.)  .  The patient's initial evaluation and plan have been discussed with the prior provider who initially evaluated the patient.  Nursing Notes, Past Medical Hx, Past Surgical Hx, Social Hx, Allergies, and Family Hx were all reviewed.    ASSESSMENT / PLAN  (MEDICAL DECISION MAKING)     Tushar Block is a 83 y.o. male who presents with shortness of breath.  Respiratory acidosis on VBG.  Started on Vapotherm but VBG worsening.  Transition to BiPAP.  Repeat VBG after this is pending.  Hospitalist to admit to PCU.  Clinically looks much better on the BiPAP.      Medical Decision Making  Amount and/or Complexity of Data Reviewed  Labs: ordered. Decision-making details documented in ED Course.  Radiology: ordered.  ECG/medicine tests: ordered.    Risk  Prescription drug management.  Decision regarding hospitalization.        Clinical Impression     1. Acute hypoxemic respiratory failure (HCC)    2. Pneumonia of right lower lobe due to infectious organism    3. Sepsis without acute organ dysfunction, due to unspecified organism (HCC)        Disposition     PATIENT REFERRED TO:  No follow-up provider specified.    DISCHARGE MEDICATIONS:  New Prescriptions    No medications on file       DISPOSITION Admitted 04/09/2024 06:01:44 PM        Diagnostic Results and Other Data     RADIOLOGY:  XR CHEST PORTABLE   Final Result   1.  Pulmonary opacities are present in the right lung concerning for pneumonia.          LABS:   Results for orders placed or performed during the hospital encounter of 04/09/24   COVID-19 & Influenza Combo    Specimen: Nasopharyngeal Swab   Result Value Ref Range    SARS-CoV-2 RNA, RT PCR  repeat on vapotherm but if not improving will consider BiPAP. Reportedly DNI.  [BC]   1749 Blood Gas, Venous(!!):    pH, Nick 7.148(!!)   pCO2, Nick 92.4(!)   pO2, Nick 94.4(!)   HCO3, Venous 32.0(!)   Base Excess, Nick 1.5   O2 Sat, Nick 96   Carboxyhemoglobin 1.1   MetHgb, Nick 0.2   TC02 (Calc), Nick 35   Hemoglobin, Nick, Reduced 4.20  Worsening on vapotherm. Will initiate BiPAP. [BC]      ED Course User Index  [BC] Alex Coughlin MD       The patient was given the following medications:  Orders Placed This Encounter   Medications    vancomycin (VANCOCIN) 2,000 mg in sodium chloride 0.9 % 500 mL IVPB     Order Specific Question:   Antimicrobial Indications     Answer:   Pneumonia (HAP)    piperacillin-tazobactam (ZOSYN) 4,500 mg in sodium chloride 0.9 % 100 mL IVPB (mini-bag)     Order Specific Question:   Antimicrobial Indications     Answer:   Pneumonia (HAP)    albuterol (PROVENTIL) (2.5 MG/3ML) 0.083% nebulizer solution 2.5 mg     Order Specific Question:   Initiate RT Bronchodilator Protocol     Answer:   Yes - Inpatient Protocol    ipratropium 0.5 mg-albuterol 2.5 mg (DUONEB) nebulizer solution 1 Dose     Order Specific Question:   Initiate RT Bronchodilator Protocol     Answer:   Yes - Inpatient Protocol    sodium chloride flush 0.9 % injection 5-40 mL    sodium chloride flush 0.9 % injection 5-40 mL    0.9 % sodium chloride infusion    enoxaparin (LOVENOX) injection 40 mg     Order Specific Question:   Indication of Use     Answer:   Prophylaxis-DVT/PE    OR Linked Order Group     ondansetron (ZOFRAN-ODT) disintegrating tablet 4 mg     ondansetron (ZOFRAN) injection 4 mg    polyethylene glycol (GLYCOLAX) packet 17 g    OR Linked Order Group     acetaminophen (TYLENOL) tablet 650 mg     acetaminophen (TYLENOL) suppository 650 mg    furosemide (LASIX) injection 40 mg    piperacillin-tazobactam (ZOSYN) 3,375 mg in sodium chloride 0.9 % 50 mL IVPB (mini-bag)     Order Specific Question:   Antimicrobial

## 2024-04-09 NOTE — PROGRESS NOTES
04/09/24 1611   Treatment   Treatment Type Aerosol generator   $Treatment Type $Inhaled Therapy/Meds   Medications Albuterol/Ipratropium   Pre-Tx Pulse 105   Pre-Tx Resps 24   Breath Sounds Pre-Tx SANJAY Rhonchi   Breath Sounds Pre-Tx LLL Diminished   Breath Sounds Pre-Tx RUL Rhonchi   Breath Sounds Pre-Tx RML Rhonchi   Breath Sounds Pre-Tx RLL Diminished   Breath Sounds Post-Tx SANJAY Rhonchi   Breath Sounds Post-Tx LLL Diminished   Breath Sounds Post-Tx RUL Rhonchi   Breath Sounds Post-Tx RML Rhonchi   Breath Sounds Post-Tx RLL Diminished   Post-Tx Pulse 21   Post-Tx Resps 30   Delivery Source In-line nebulizer   Position Semi-Gruber's   Treatment Tolerance Fair   Duration 20   Is patient on O2? Y   Breath Sounds   Upper Airway Sounds Coarse   Breath Sounds Bilateral Rhonchi   Right Upper Lobe Rhonchi   Right Middle Lobe Rhonchi   Right Lower Lobe Diminished   Left Upper Lobe Rhonchi   Left Lower Lobe Diminished   Oxygen Therapy/Pulse Ox   O2 Therapy Oxygen humidified   O2 Device Heated high flow cannula   O2 Flow Rate (L/min) 40 L/min   FiO2  100 %   Pulse (!) 102   Respirations 28   SpO2 99 %   Pulse Oximeter Device Mode Continuous   Pulse Oximeter Device Location Finger   Cough/Sputum   Sputum How Obtained Oral tracheal;Nasal tracheal;Spontaneous cough   Cough Congested;Productive;Weak;Moist   Frequency   (Unable to generate effective cough)   Sputum Amount Moderate   Sputum Color White;Yellow;Creamy   Tenacity Thin;Thick   Patient Observation   Observations Increased WOB, no gag reflux/unable to generate effective cough

## 2024-04-09 NOTE — ED PROVIDER NOTES
THE [facility]  EMERGENCY DEPARTMENT ENCOUNTER          ATTENDING PHYSICIAN NOTE       Date of evaluation: 4/9/2024    Chief Complaint     Altered Mental Status and Shortness of Breath (Pt. Presents to ED via EMS from [place] with c/o AMS and SOB.)      History of Present Illness     Tushar Block is a 83 y.o. male with history of osteomyelitis, recurrent MDRO infections, CAD status post CABG, chronic indwelling Iglesias presenting to the emergency department today for altered mental status and shortness of breath.  Per nursing home report to EMS, patient was noted to be more altered and having increased work of breathing starting earlier today.  EMS noted him to be hypoxic into the 70s on room air.  Was placed on nonrebreather.  The patient is unable to provide any significant additional history other than noting that he is short of breath.    Physical Exam     INITIAL VITALS: BP: (!) 140/62, Temp: 97.7 °F (36.5 °C), Pulse: (!) 105, Respirations: 20, SpO2: 97 %     Physical Exam    Nursing note and vitals reviewed.    General:  Adult male, chronically ill-appearing and in moderate respiratory distress.  HENT: Normocephalic and atraumatic. External ears normal. Nose appears normal externally.  Eyes: Conjunctivae normal. No scleral icterus. PERRL  Neck: Neck supple. No tracheal deviation present.   CV: Tachycardic rate. Regular rhythm. S1/S2 auscultated. No murmurs, gallops or rubs.   Pulm: Moderately increased work of breathing on nonrebreather mask.  Diffuse rhonchi noted.  No wheezing or prolonged expiratory phase.  GI: Soft. No distension. No tenderness. No rebound or guarding. No masses. No peritoneal signs.    Musculoskeletal: No edema.  Chronic atrophy and contracture deformities noted.  Neurological: Alert with slowed reactions.  Answers yes/no questions.   Skin: Stage II pressure ulcers to the right posterior shoulder region with scant bleeding noted.  No significant surrounding erythema or induration  RELEASE TABLET    Take 1 tablet by mouth 2 times daily as needed for Congestion    LEVOTHYROXINE (SYNTHROID) 75 MCG TABLET    Take 1 tablet by mouth Daily    LORATADINE (CLARITIN) 10 MG TABLET    Take 1 tablet by mouth daily    MELATONIN 5 MG TABS TABLET    Take 1 tablet by mouth nightly    MICONAZOLE (MICOTIN) 2 % POWDER    Apply topically 2 times daily.    MULTIPLE VITAMINS-MINERALS (THERAPEUTIC MULTIVITAMIN-MINERALS) TABLET    Take 1 tablet by mouth daily    NUTRITIONAL SUPPLEMENTS (RONEY PO)    Take 1 packet by mouth 2 times daily    PANTOPRAZOLE (PROTONIX) 40 MG TABLET    Take 1 tablet by mouth every morning (before breakfast)    POLYETHYLENE GLYCOL (MIRALAX) 17 G PACK PACKET    Take 17 g by mouth See Admin Instructions Daily and PRN    PROMETHAZINE (PHENERGAN) 12.5 MG TABLET    Take 1 tablet by mouth every 8 hours as needed for Nausea    TRAMADOL (ULTRAM) 50 MG TABLET    Take 1 tablet by mouth in the morning, at noon, and at bedtime. Max Daily Amount: 150 mg    TRAZODONE (DESYREL) 150 MG TABLET    Take 1 tablet by mouth nightly    VITAMIN D (CHOLECALCIFEROL) 25 MCG (1000 UT) TABS TABLET    Take 2 tablets by mouth daily       Allergies     He is allergic to bactrim [sulfamethoxazole-trimethoprim].                    Cam Zavala MD  04/09/24 2623       Cam Zavala MD  04/09/24 9154

## 2024-04-09 NOTE — PROGRESS NOTES
04/09/24 1540   Oxygen Therapy/Pulse Ox   O2 Therapy Oxygen humidified   O2 Device (S)  Heated high flow cannula   O2 Flow Rate (L/min) 40 L/min   FiO2  100 %   Pulse (!) 108   Respirations 14   SpO2 95 %   Pulse Oximeter Device Mode Continuous   Pulse Oximeter Device Location Finger

## 2024-04-10 PROBLEM — J96.21 ACUTE ON CHRONIC RESPIRATORY FAILURE WITH HYPOXIA AND HYPERCAPNIA (HCC): Status: ACTIVE | Noted: 2024-04-10

## 2024-04-10 PROBLEM — J18.9 PNEUMONIA OF BOTH LOWER LOBES DUE TO INFECTIOUS ORGANISM: Status: ACTIVE | Noted: 2024-04-10

## 2024-04-10 PROBLEM — J96.22 ACUTE ON CHRONIC RESPIRATORY FAILURE WITH HYPOXIA AND HYPERCAPNIA (HCC): Status: ACTIVE | Noted: 2024-04-10

## 2024-04-10 LAB
AMMONIA PLAS-SCNC: 20 UMOL/L (ref 16–60)
AMORPH SED URNS QL MICRO: ABNORMAL /HPF
ANION GAP SERPL CALCULATED.3IONS-SCNC: 16 MMOL/L (ref 3–16)
ANISOCYTOSIS BLD QL SMEAR: ABNORMAL
BACTERIA URNS QL MICRO: ABNORMAL /HPF
BASE EXCESS BLDA CALC-SCNC: 0.8 MMOL/L (ref -3–3)
BASE EXCESS BLDA CALC-SCNC: 1.5 MMOL/L (ref -3–3)
BASE EXCESS BLDA CALC-SCNC: 3.2 MMOL/L (ref -3–3)
BASOPHILS # BLD: 0 K/UL (ref 0–0.2)
BASOPHILS NFR BLD: 0 %
BILIRUB UR QL STRIP.AUTO: NEGATIVE
BUN SERPL-MCNC: 84 MG/DL (ref 7–20)
CALCIUM SERPL-MCNC: 8.4 MG/DL (ref 8.3–10.6)
CHLORIDE SERPL-SCNC: 105 MMOL/L (ref 99–110)
CLARITY UR: CLEAR
CO2 BLDA-SCNC: 33 MMOL/L
CO2 BLDA-SCNC: 33 MMOL/L
CO2 BLDA-SCNC: 34 MMOL/L
CO2 SERPL-SCNC: 25 MMOL/L (ref 21–32)
COHGB MFR BLDA: 1 % (ref 0–1.5)
COHGB MFR BLDA: 1.1 % (ref 0–1.5)
COHGB MFR BLDA: 1.1 % (ref 0–1.5)
COLOR UR: YELLOW
CREAT SERPL-MCNC: 1.1 MG/DL (ref 0.8–1.3)
CRP SERPL-MCNC: 89.2 MG/L (ref 0–5.1)
DEPRECATED RDW RBC AUTO: 18.8 % (ref 12.4–15.4)
EKG ATRIAL RATE: 87 BPM
EKG DIAGNOSIS: NORMAL
EKG P AXIS: 52 DEGREES
EKG P-R INTERVAL: 226 MS
EKG Q-T INTERVAL: 346 MS
EKG QRS DURATION: 84 MS
EKG QTC CALCULATION (BAZETT): 416 MS
EKG R AXIS: 39 DEGREES
EKG T AXIS: 32 DEGREES
EKG VENTRICULAR RATE: 87 BPM
EOSINOPHIL # BLD: 0 K/UL (ref 0–0.6)
EOSINOPHIL NFR BLD: 0 %
EPI CELLS #/AREA URNS HPF: ABNORMAL /HPF (ref 0–5)
ERYTHROCYTE [SEDIMENTATION RATE] IN BLOOD BY WESTERGREN METHOD: 87 MM/HR (ref 0–20)
GFR SERPLBLD CREATININE-BSD FMLA CKD-EPI: 67 ML/MIN/{1.73_M2}
GLUCOSE BLD-MCNC: 122 MG/DL (ref 70–99)
GLUCOSE SERPL-MCNC: 151 MG/DL (ref 70–99)
GLUCOSE UR STRIP.AUTO-MCNC: NEGATIVE MG/DL
HCO3 BLDA-SCNC: 31 MMOL/L (ref 21–29)
HCO3 BLDA-SCNC: 31 MMOL/L (ref 21–29)
HCO3 BLDA-SCNC: 32 MMOL/L (ref 21–29)
HCT VFR BLD AUTO: 31.8 % (ref 40.5–52.5)
HGB BLD-MCNC: 9.7 G/DL (ref 13.5–17.5)
HGB BLDA-MCNC: 10.6 G/DL
HGB BLDA-MCNC: 11.5 G/DL
HGB BLDA-MCNC: 11.8 G/DL
HGB UR QL STRIP.AUTO: ABNORMAL
KETONES UR STRIP.AUTO-MCNC: NEGATIVE MG/DL
LEUKOCYTE ESTERASE UR QL STRIP.AUTO: ABNORMAL
LYMPHOCYTES # BLD: 1.2 K/UL (ref 1–5.1)
LYMPHOCYTES NFR BLD: 7 %
MAGNESIUM SERPL-MCNC: 2.6 MG/DL (ref 1.8–2.4)
MAGNESIUM SERPL-MCNC: 2.6 MG/DL (ref 1.8–2.4)
MCH RBC QN AUTO: 28.2 PG (ref 26–34)
MCHC RBC AUTO-ENTMCNC: 30.6 G/DL (ref 31–36)
MCV RBC AUTO: 92 FL (ref 80–100)
METHGB MFR BLDA: 0 % (ref 0–1.4)
METHGB MFR BLDA: 0.1 % (ref 0–1.4)
METHGB MFR BLDA: 0.1 % (ref 0–1.4)
MICROCYTES BLD QL SMEAR: ABNORMAL
MONOCYTES # BLD: 0.3 K/UL (ref 0–1.3)
MONOCYTES NFR BLD: 2 %
NEUTROPHILS # BLD: 15.7 K/UL (ref 1.7–7.7)
NEUTROPHILS NFR BLD: 91 %
NITRITE UR QL STRIP.AUTO: NEGATIVE
OVALOCYTES BLD QL SMEAR: ABNORMAL
PCO2 BLDA: 71.8 MMHG (ref 35–45)
PCO2 BLDA: 74.8 MMHG (ref 35–45)
PCO2 BLDA: 83.1 MMHG (ref 35–45)
PERFORMED ON: ABNORMAL
PH BLDA: 7.18 [PH] (ref 7.35–7.45)
PH BLDA: 7.22 [PH] (ref 7.35–7.45)
PH BLDA: 7.26 [PH] (ref 7.35–7.45)
PH UR STRIP.AUTO: 6 [PH] (ref 5–8)
PLATELET # BLD AUTO: 312 K/UL (ref 135–450)
PMV BLD AUTO: 7.9 FL (ref 5–10.5)
PO2 BLDA: 107 MMHG (ref 75–108)
PO2 BLDA: 111 MMHG (ref 75–108)
PO2 BLDA: 84.2 MMHG (ref 75–108)
POTASSIUM SERPL-SCNC: 5.3 MMOL/L (ref 3.5–5.1)
PREALB SERPL-MCNC: 13.8 MG/DL (ref 20–40)
PROT UR STRIP.AUTO-MCNC: ABNORMAL MG/DL
RBC # BLD AUTO: 3.45 M/UL (ref 4.2–5.9)
RBC #/AREA URNS HPF: ABNORMAL /HPF (ref 0–4)
SAO2 % BLDA: 96 % (ref 93–100)
SAO2 % BLDA: 98 % (ref 93–100)
SAO2 % BLDA: 98 % (ref 93–100)
SODIUM SERPL-SCNC: 146 MMOL/L (ref 136–145)
SP GR UR STRIP.AUTO: 1.02 (ref 1–1.03)
TROPONIN, HIGH SENSITIVITY: 163 NG/L (ref 0–22)
UA COMPLETE W REFLEX CULTURE PNL UR: YES
UA DIPSTICK W REFLEX MICRO PNL UR: YES
URN SPEC COLLECT METH UR: ABNORMAL
UROBILINOGEN UR STRIP-ACNC: 0.2 E.U./DL
WBC # BLD AUTO: 17.3 K/UL (ref 4–11)
WBC #/AREA URNS HPF: ABNORMAL /HPF (ref 0–5)
YEAST URNS QL MICRO: PRESENT /HPF

## 2024-04-10 PROCEDURE — 99221 1ST HOSP IP/OBS SF/LOW 40: CPT | Performed by: NURSE PRACTITIONER

## 2024-04-10 PROCEDURE — 87449 NOS EACH ORGANISM AG IA: CPT

## 2024-04-10 PROCEDURE — 6360000002 HC RX W HCPCS

## 2024-04-10 PROCEDURE — 2060000000 HC ICU INTERMEDIATE R&B

## 2024-04-10 PROCEDURE — 87086 URINE CULTURE/COLONY COUNT: CPT

## 2024-04-10 PROCEDURE — 6360000002 HC RX W HCPCS: Performed by: STUDENT IN AN ORGANIZED HEALTH CARE EDUCATION/TRAINING PROGRAM

## 2024-04-10 PROCEDURE — 94640 AIRWAY INHALATION TREATMENT: CPT

## 2024-04-10 PROCEDURE — 2700000000 HC OXYGEN THERAPY PER DAY

## 2024-04-10 PROCEDURE — 86140 C-REACTIVE PROTEIN: CPT

## 2024-04-10 PROCEDURE — 82803 BLOOD GASES ANY COMBINATION: CPT

## 2024-04-10 PROCEDURE — 99223 1ST HOSP IP/OBS HIGH 75: CPT | Performed by: INTERNAL MEDICINE

## 2024-04-10 PROCEDURE — 85652 RBC SED RATE AUTOMATED: CPT

## 2024-04-10 PROCEDURE — 2580000003 HC RX 258: Performed by: INTERNAL MEDICINE

## 2024-04-10 PROCEDURE — 85025 COMPLETE CBC W/AUTO DIFF WBC: CPT

## 2024-04-10 PROCEDURE — 81001 URINALYSIS AUTO W/SCOPE: CPT

## 2024-04-10 PROCEDURE — 82140 ASSAY OF AMMONIA: CPT

## 2024-04-10 PROCEDURE — 93005 ELECTROCARDIOGRAM TRACING: CPT | Performed by: INTERNAL MEDICINE

## 2024-04-10 PROCEDURE — 84484 ASSAY OF TROPONIN QUANT: CPT

## 2024-04-10 PROCEDURE — 2580000003 HC RX 258: Performed by: STUDENT IN AN ORGANIZED HEALTH CARE EDUCATION/TRAINING PROGRAM

## 2024-04-10 PROCEDURE — 6370000000 HC RX 637 (ALT 250 FOR IP): Performed by: INTERNAL MEDICINE

## 2024-04-10 PROCEDURE — 36415 COLL VENOUS BLD VENIPUNCTURE: CPT

## 2024-04-10 PROCEDURE — 80048 BASIC METABOLIC PNL TOTAL CA: CPT

## 2024-04-10 PROCEDURE — 37799 UNLISTED PX VASCULAR SURGERY: CPT

## 2024-04-10 PROCEDURE — 2580000003 HC RX 258

## 2024-04-10 PROCEDURE — 83735 ASSAY OF MAGNESIUM: CPT

## 2024-04-10 PROCEDURE — 6370000000 HC RX 637 (ALT 250 FOR IP): Performed by: EMERGENCY MEDICINE

## 2024-04-10 PROCEDURE — 93010 ELECTROCARDIOGRAM REPORT: CPT | Performed by: INTERNAL MEDICINE

## 2024-04-10 PROCEDURE — 36600 WITHDRAWAL OF ARTERIAL BLOOD: CPT

## 2024-04-10 PROCEDURE — 94660 CPAP INITIATION&MGMT: CPT

## 2024-04-10 RX ORDER — SODIUM CHLORIDE, SODIUM LACTATE, POTASSIUM CHLORIDE, CALCIUM CHLORIDE 600; 310; 30; 20 MG/100ML; MG/100ML; MG/100ML; MG/100ML
INJECTION, SOLUTION INTRAVENOUS CONTINUOUS
Status: DISCONTINUED | OUTPATIENT
Start: 2024-04-10 | End: 2024-04-10

## 2024-04-10 RX ORDER — ALBUTEROL SULFATE 2.5 MG/3ML
2.5 SOLUTION RESPIRATORY (INHALATION)
Status: DISCONTINUED | OUTPATIENT
Start: 2024-04-10 | End: 2024-04-12 | Stop reason: HOSPADM

## 2024-04-10 RX ORDER — SODIUM CHLORIDE 9 MG/ML
INJECTION, SOLUTION INTRAVENOUS CONTINUOUS
Status: DISCONTINUED | OUTPATIENT
Start: 2024-04-10 | End: 2024-04-10

## 2024-04-10 RX ORDER — SODIUM CHLORIDE FOR INHALATION 3 %
4 VIAL, NEBULIZER (ML) INHALATION
Status: DISCONTINUED | OUTPATIENT
Start: 2024-04-10 | End: 2024-04-12 | Stop reason: HOSPADM

## 2024-04-10 RX ORDER — CASTOR OIL AND BALSAM, PERU 788; 87 MG/G; MG/G
OINTMENT TOPICAL 2 TIMES DAILY
Status: DISCONTINUED | OUTPATIENT
Start: 2024-04-10 | End: 2024-04-12 | Stop reason: HOSPADM

## 2024-04-10 RX ORDER — SODIUM CHLORIDE, SODIUM LACTATE, POTASSIUM CHLORIDE, AND CALCIUM CHLORIDE .6; .31; .03; .02 G/100ML; G/100ML; G/100ML; G/100ML
1000 INJECTION, SOLUTION INTRAVENOUS ONCE
Status: COMPLETED | OUTPATIENT
Start: 2024-04-10 | End: 2024-04-10

## 2024-04-10 RX ADMIN — IPRATROPIUM BROMIDE AND ALBUTEROL SULFATE 1 DOSE: 2.5; .5 SOLUTION RESPIRATORY (INHALATION) at 16:14

## 2024-04-10 RX ADMIN — SODIUM CHLORIDE, SODIUM LACTATE, POTASSIUM CHLORIDE, AND CALCIUM CHLORIDE 1000 ML: .6; .31; .03; .02 INJECTION, SOLUTION INTRAVENOUS at 07:40

## 2024-04-10 RX ADMIN — BUDESONIDE 250 MCG: 0.25 INHALANT RESPIRATORY (INHALATION) at 08:23

## 2024-04-10 RX ADMIN — IPRATROPIUM BROMIDE AND ALBUTEROL SULFATE 1 DOSE: 2.5; .5 SOLUTION RESPIRATORY (INHALATION) at 04:22

## 2024-04-10 RX ADMIN — PIPERACILLIN AND TAZOBACTAM 3375 MG: 3; .375 INJECTION, POWDER, LYOPHILIZED, FOR SOLUTION INTRAVENOUS at 00:51

## 2024-04-10 RX ADMIN — SODIUM CHLORIDE 25 ML: 9 INJECTION, SOLUTION INTRAVENOUS at 10:03

## 2024-04-10 RX ADMIN — IPRATROPIUM BROMIDE AND ALBUTEROL SULFATE 1 DOSE: 2.5; .5 SOLUTION RESPIRATORY (INHALATION) at 12:36

## 2024-04-10 RX ADMIN — SODIUM CHLORIDE: 9 INJECTION, SOLUTION INTRAVENOUS at 09:47

## 2024-04-10 RX ADMIN — SODIUM CHLORIDE 25 ML: 9 INJECTION, SOLUTION INTRAVENOUS at 15:17

## 2024-04-10 RX ADMIN — SODIUM CHLORIDE, PRESERVATIVE FREE 10 ML: 5 INJECTION INTRAVENOUS at 00:51

## 2024-04-10 RX ADMIN — PIPERACILLIN AND TAZOBACTAM 3375 MG: 3; .375 INJECTION, POWDER, LYOPHILIZED, FOR SOLUTION INTRAVENOUS at 10:04

## 2024-04-10 RX ADMIN — ALBUTEROL SULFATE 2.5 MG: 2.5 SOLUTION RESPIRATORY (INHALATION) at 21:08

## 2024-04-10 RX ADMIN — IPRATROPIUM BROMIDE AND ALBUTEROL SULFATE 1 DOSE: 2.5; .5 SOLUTION RESPIRATORY (INHALATION) at 08:22

## 2024-04-10 RX ADMIN — IPRATROPIUM BROMIDE AND ALBUTEROL SULFATE 1 DOSE: 2.5; .5 SOLUTION RESPIRATORY (INHALATION) at 00:43

## 2024-04-10 RX ADMIN — PIPERACILLIN AND TAZOBACTAM 3375 MG: 3; .375 INJECTION, POWDER, LYOPHILIZED, FOR SOLUTION INTRAVENOUS at 17:43

## 2024-04-10 RX ADMIN — SODIUM CHLORIDE: 9 INJECTION, SOLUTION INTRAVENOUS at 00:49

## 2024-04-10 RX ADMIN — SODIUM CHLORIDE 1250 MG: 9 INJECTION, SOLUTION INTRAVENOUS at 15:19

## 2024-04-10 RX ADMIN — Medication: at 12:23

## 2024-04-10 RX ADMIN — SODIUM CHLORIDE 30 MG/ML INHALATION SOLUTION 4 ML: 30 SOLUTION INHALANT at 21:08

## 2024-04-10 RX ADMIN — BUDESONIDE 250 MCG: 0.25 INHALANT RESPIRATORY (INHALATION) at 21:08

## 2024-04-10 RX ADMIN — ARFORMOTEROL TARTRATE 15 MCG: 15 SOLUTION RESPIRATORY (INHALATION) at 08:23

## 2024-04-10 RX ADMIN — Medication: at 20:19

## 2024-04-10 RX ADMIN — ARFORMOTEROL TARTRATE 15 MCG: 15 SOLUTION RESPIRATORY (INHALATION) at 21:08

## 2024-04-10 RX ADMIN — ENOXAPARIN SODIUM 40 MG: 100 INJECTION SUBCUTANEOUS at 09:58

## 2024-04-10 ASSESSMENT — PAIN SCALES - GENERAL: PAINLEVEL_OUTOF10: 0

## 2024-04-10 NOTE — ED NOTES
updated Vicky RN at hospice via telephone. ([phone])    Patient placement and code status should be reassessed at discharge for placement back at [place] vs. Home vs. Hospice per Vicky.      Amador Muñoz, RN  04/09/24 3903

## 2024-04-10 NOTE — CONSULTS
[facility]  Palliative Medicine Consultation Note      Date Of Admission:4/9/2024  Date of consult: 04/10/24  Seen by PC in the past:  Yes    Recommendations:        Pt is very well known to the palliative care team. He has a HCPOA on file naming son Wilmer as the primary agent, gilles Decker as the first alternate agent and daughter Misti as the second alternate agent.     Spoke with pt's son Wilmer over the phone. Provided updates regarding pt's medical condition and code status. Updated Wilmer that pt told ER provider he would not want intubation or CPR. Wilmer is in agreement with limited code x4. I did explain that pt is fairly sick at this time with aspiration PNA and that he may not recover. If that is the case he may need to transition to DNRCC. Wilmer was in agreement. Wilmer is taking off work Friday and will come to the hospital Friday morning to discuss. If pt declines prior to Friday Wilmer asked that we call him.    D/w [facility] ALPHONSO Mullins, BAYRON Kaufman and Dr. Castro    1. Goals of Care/Advanced Care planning/Code status: Limited x4, continue with bipap, IV abx, medical management as appropriate at this time. If pt does not improve pt's son Wilmer will consider transition to comfort care. Plan to meet in person Friday morning. Pt has a HCPOA on file naming son Wilmer as the primary agent, gilles Decker as the first alternate agent and willian Chappell as the second alternate agent.   2. Pain: pt unable to state, does not appear uncomfortable  3. SOB, acute hypercapnic respiratory failure 2/2 PNA: pt currently on bipap. Pt is still retaining CO2. He remains on IV abx. Pulm consulted. Concern for possible aspiration PNA given pt's history. This was discussed with pt's son Wilmer in depth. No intubation if breathing worsens.   4. Disposition: TBD, pt previously enrolled with [facility]    Reason for Consult:         []  Goals of Care  [x]  Code Status Discussion/Advanced Care Planning   []  Psychosocial/Family

## 2024-04-10 NOTE — PROGRESS NOTES
Patient presented to PCU obtunded, with no response to pain, and cool extremities. Patient heart rate briefly elevated in a flutter to 140's and hypotension. MD at patients bedside. MD informed of patients condition.

## 2024-04-10 NOTE — PROGRESS NOTES
Susanna ENG, RN (ph # [phone]) from [facility] here to see pt, update given on current plan of care. She states patient's son Jourdan usually texts her back, so she has reached out to him via text to let him know hospital trying to get in touch with him.

## 2024-04-10 NOTE — CONSULTS
acute osseous abnormality.     Soft tissue irregularity as detailed compatible with provided history of wounds.     XR TIB/FIB LEFT 3/2/24:   IMPRESSION:     No acute osseous abnormality.    ASSESSMENT/PLAN:   Full-thickness ulcerations, right lower extremity  2.  Significant pitting edema, bilateral lower extremities  3.  Erythema, right lower extremity    -Patient seen and examined at bedside this morning  -VSS, leukocytosis noted (WBC 17.3)  -ESR and CRP pending  -lactic acid 2.0  -HbA1c 5.3 (03/01/2024)  -prealbumin pending  -Blood cultures 1 & 2 pending  -Images reviewed, impression noted above  -Wound culture not obtained at this time 2/2 no active drainage.  -Recommend patient continue IV antibiotics per primary however, no antibiotics necessary from a podiatric standpoint 2/2 no active signs of infection given erythema completely resolves with elevation and no evidence of purulence about any of the wounds.  -Patient's wounds consistent with immobilization for extended periods of time  -Right lower extremity dressed with Adaptic, gauze, Kerlix, and Ace  -Left lower extremity dressed with Mepilex border and Ace for compression  -post-op shoe ordered to patient room  -Prevalon boots ordered. Patient is to wear at all times while in bed to prevent further deep tissue injury.  -If patient's mentation improves no weightbearing restrictions from podiatry standpoint.    DISPO: Full-thickness ulcerations, right lower extremity along with multiple scattered partial-thickness ulcerations throughout bilateral lower extremities.  All labs and imaging reviewed, impressions noted above.  Recommend patient continue on IV antibiotics per primary however, patient's clinical exam is not convincing of any infectious pathologies so no antibiotics necessary from a podiatric standpoint.    Patient staffed with ANGELINA Snyder DPM   Podiatric Resident PGY1  Pager [phone] or [facility]  4/10/2024, 11:48  AM    Patient was seen and evaluated at bedside.  Agree with residents assessment and treatment plan.  Fauzia Richardson DPM

## 2024-04-10 NOTE — CONSULTS
[facility] Nurse  Consult Note       NAME:  Tushar Block  MEDICAL RECORD NUMBER:  [id number]  AGE: 83 y.o.   GENDER: male  : 1940  TODAY'S DATE:  4/10/2024    Subjective   Reason for WOCN Evaluation and Assessment: Sacrum & Buttocks, Back - Stage 3, shearing  R Elbow - DTI      Tushar Block is a 83 y.o. male referred by:   [] Physician  [x] Nursing  [] Other:     Wound Identification:  Wound Type: pressure and traumatic  Contributing Factors: chronic pressure, decreased mobility, shear force, incontinence of stool, and incontinence of urine    Wound History: Mr Block is an 84 yo M with a PMHx of CAD s/p CABG, chronic indwelling schneider catheter, pAfib, GERD, and muscle spasticity (on Baclofen pump) who presented to ED on  for AMS and SOB.       EMS noted pt to be hypoxic to 70s on room air and pt was placed on non rebreather. Pt is unable to provide any history as he is obtunded. Unable to get collateral information at this time.     Recent hospitalization 3/16/24 - 3/21/24 for sepsis. 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 Previous cultures have grown MDR Marganella and Enterococcus. Given increased oxygen requirements, and pt's recent AMS suspected that pt likely had aspiration event leading to PNA. Placed on broad spectrum abx and given IVF. Antihypertensives were held. Linezolid (3/17-3/20) - MRSA neg. Zosyn (3/17-3/21). Switched to Augmentin on discharge to be taken for 3/21 - 3/23.   Current Wound Care Treatment:  Back & Buttocks - abd pads  R Elbow - foam    Patient Goal of Care:  [x] Wound Healing  [] Odor Control  [] Palliative Care  [] Pain Control   [] Other:         PAST MEDICAL HISTORY        Diagnosis Date    BPH (benign prostatic hyperplasia)     C. difficile colitis 2022    Carotid stenosis 2013    JESU 16-49% stenosis; LICA 50-79% stenosis    Chronic back pain     Encounter for imaging to screen for metal prior to

## 2024-04-10 NOTE — PROGRESS NOTES
[facility] -  Clinical Pharmacy Note    Vancomycin - Management by Pharmacy    Consult Date(s): 4/9/24  Consulting Provider(s): Jaret Vieyra    Assessment / Plan  Aspiration PNA - Vancomycin  Concurrent Antimicrobials: Zosyn  Day of Vanc Therapy / Ordered Duration: day 2 / unspecified  Current Dosing Method: Bayesian-Guided AUC Dosing  Therapeutic Goal: -600 mg/L*hr  Current Dose / Plan:   Scr today = 1.1, elevated just a bit above patient's baseline of 0.8-0.9  UOP adequate at ~ 1mL/kg/hr  Vancomycin 2000 mg x 1 given yesterday  Vancomycin 1250 mg q24h ordered  Predicted , trough 13.4 mg/L  Regimen is on the high end of AUC range but would anticipate patient's renal function will improve back to baseline in the near future  Random level ordered tomorrow AM.  MRSA nares ordered, would recommend discontinuing vancomycin if resulting negative  Will continue to monitor clinical condition and make adjustments to regimen as appropriate.    Thank you for consulting pharmacy,    Luis Rosa, PharmD  PGY1 Pharmacy Resident  Phone: [phone]  Main Pharmacy: [id number]  4/10/2024 8:55 AM      Interval update: Remains on BiPAP at 60%, slightly hypotensive, leukocytosis increased    Subjective/Objective:   Tushar Block is a 83 y.o. male with a PMHx significant for osteomyelitis, recurrent MDRO infections, CAD status post CABG, chronic indwelling Iglesias who is admitted with PNA.     Pharmacy is consulted to dose vancomycin.    Ht Readings from Last 1 Encounters:   04/09/24 1.829 m (6')     Wt Readings from Last 1 Encounters:   04/09/24 91.5 kg (201 lb 12.8 oz)     Current & Prior Antimicrobial Regimen(s):  Zosyn (4/9 - current)  Vancomycin   2000 mg x 1 (4/9)  1250 mg q24h (4/9 - current)    Vancomycin Level(s) / Doses:    Date Time Dose Type of Level / Level Interpretation   4/11 0600 1250 mg q24h Random = ordered           Note: Serum levels collected for AUC-based dosing may be high if collected in close

## 2024-04-10 NOTE — PROGRESS NOTES
Daytime lab techs have been unable to get blood for ordered labs x 3 different staff members. 2nd shift should be coming in soon to make another attempt.

## 2024-04-10 NOTE — PLAN OF CARE
Problem: Discharge Planning  Goal: Discharge to home or other facility with appropriate resources  4/10/2024 1040 by Jones Valiente RN  Outcome: Progressing  Flowsheets (Taken 4/10/2024 0000 by Amador Motta, RN)  Discharge to home or other facility with appropriate resources:   Identify barriers to discharge with patient and caregiver   Arrange for needed discharge resources and transportation as appropriate   Identify discharge learning needs (meds, wound care, etc)   Arrange for interpreters to assist at discharge as needed   Refer to discharge planning if patient needs post-hospital services based on physician order or complex needs related to functional status, cognitive ability or social support system     Problem: Pain  Goal: Verbalizes/displays adequate comfort level or baseline comfort level  4/10/2024 1652 by Case Zambrano, RN  Outcome: Progressing  4/10/2024 1040 by Jones Valiente RN  Outcome: Progressing     Problem: Skin/Tissue Integrity  Goal: Absence of new skin breakdown  Description: 1.  Monitor for areas of redness and/or skin breakdown  2.  Assess vascular access sites hourly  3.  Every 4-6 hours minimum:  Change oxygen saturation probe site  4.  Every 4-6 hours:  If on nasal continuous positive airway pressure, respiratory therapy assess nares and determine need for appliance change or resting period.  4/10/2024 1652 by Case Zambrano, RN  Outcome: Progressing  4/10/2024 1040 by Jones Valiente RN  Outcome: Progressing     Problem: Safety - Adult  Goal: Free from fall injury  Outcome: Progressing     Problem: Chronic Conditions and Co-morbidities  Goal: Patient's chronic conditions and co-morbidity symptoms are monitored and maintained or improved  Outcome: Progressing

## 2024-04-10 NOTE — PROGRESS NOTES
Patient is refusing prevalion boots. Patients said clearly to this RN and also Jones RN that he does not want boots. Patient was encouraged to educated to wear the boots to improve his wounds, patient persists with a raised voice repeatedly saying\" No Boots!\". Boots not applied to patient per his wishes/right.

## 2024-04-10 NOTE — CONSULTS
Pulmonary Consult Note  PGY 3      CC Empyema    History Obtained From:  patient    HISTORY OF PRESENT ILLNESS:  83 M with PMH of osteomyelitis, recurrent MDRO infections, CAD s/p CABG, chronic indwelling Iglesias catheter presenting to the ED for AMS SOB.  Per nursing report to EMS patient noted to be more altered and having increased work of breathing starting earlier today.  EMS noted him to be hypoxic to 70s on room air, was placed on nonrebreather.     On initial presentation to the ED patient was tachypneic in the 20s, tachycardic 200s, mildly hypertensive to 140/62.  VBG showed pH 7.252/76.6.  Patient was placed on Vapotherm.CBC showed leukocytosis 1222.  Patient was placed on Vapotherm showed worsening acidosis.  Then placed on BiPAP and admitted.    Of note patient was seen by Dr. Celeste during hospitalization in January 2024 for loculated pleural effusion.  It was thought that this left lower lobe lesion was likely rounded atelectasis, this was present dating back to 2017.  Bronchoscopy was done with biopsies showing respiratory Mucosa with nonspecific reactive inflammatory changes.    Past Medical History:        Diagnosis Date    BPH (benign prostatic hyperplasia)     C. 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  Noncontrast CT with unchanged appearance of left lower lobe empyema or   abscess and bilateral multifocal pneumonia. Recommend IV contrast on future   examinations for better evaluation.         XR CHEST PORTABLE   Final Result   1.  Pulmonary opacities are present in the right lung concerning for pneumonia.              ASSESSMENT AND PLAN:    Acute hypoxic and hypercapnic respiratory failure likely secondary to aspiration  pneumonia  Acute encephalopathy  CT scans showing bilateral multi focal pneumonia and consolidation in bilateral lower lobes.  There is question as to whether the patient went home with hospice.  He is likely aspirating  -Continue BiPAP, patient may take breaks if mental status or ABG improves  -Continue antibiotic Zosyn, patient is also on vanc, though MRSA nares negative in the past  -Continue Pulmicort, Brovana  -change DuoNeb to albuterol and 3% saline neb q4h with metaneb to clear secretions  -Follow-up micro serologies and cultures    Will discuss with attending physician Dr. Josesito Mcnamara MD  4/10/2024,  5:21 PM     Pulmonary & Critical Care    Patient seen and examined. I agree with Dr. Mcnamara's history, physical, lab findings, assessment and plan.    He appears to have acute on chronic hypercapnic/hypoxemic resp failure with ABG 7.26/72/107 on Bipap 18/8 with FIO2 60%  CT Chest shows bilateral pneumonia with chronic loculated pleural effusion    Plan  Change duonebs to albuterol/3% saline neb with metaneb  Cont Zosyn  No intervention needed for stable loculated pleural effusion  DNR/DNI    Gonzalo Bellamy MD

## 2024-04-10 NOTE — PROGRESS NOTES
Indwelling urinary catheter was placed, per orders, using sterile technique following hospital policy. ALPHONSO Silva, observed with procedure to ensure proper technique. This catheter was placed on 4PCU. Chronic schneider exchanged.    Electronically signed by Jones Valiente RN on 4/10/2024 at 11:18 AM

## 2024-04-10 NOTE — PROGRESS NOTES
Comprehensive Nutrition Assessment    RECOMMENDATIONS:  PO Diet: Continue NPO;ADAT  ONS: begin most appropriate supplement upon diet advancement  Nutrition Education: No recommendation at this time     NUTRITION ASSESSMENT:   Nutritional summary & status: Positive nutrition screen due to wounds. Pt admitted with AMS, SOB, possible aspiration pneumonia. Currently NPO. Noted recent admission 3/16/24 - 3/21/24 for sepsis. No wt loss noted per EMR. Palliative care following.Son considering comfort care. Increased protein needs identified due to multiple wounds. If diet able to be advanced, will add appropriate ONS to promote healing. Will follow for diet advancement and decision on GOC.   Admission // PMH: Acute Hypoxemic Resp failure//CAD, s/p CABG, GERD, HTN    MALNUTRITION ASSESSMENT  Context of Malnutrition: Acute Illness   Malnutrition Status: Insufficient data  Findings of the 6 clinical characteristics of malnutrition (Minimum of 2 out of 6 clinical characteristics is required to make the diagnosis of moderate or severe Protein Calorie Malnutrition based on AND/ASPEN Guidelines):  Energy Intake:  Unable to assess  Weight Loss:  No significant weight loss     Body Fat Loss:  Unable to assess (occupied with other staff.)     Muscle Mass Loss:  Unable to assess    Fluid Accumulation:  Mild     Strength:  Not Performed    NUTRITION DIAGNOSIS   Increased nutrient needs related to increase demand for energy/nutrients as evidenced by wounds    Nutrition Monitoring and Evaluation:   Food/Nutrient Intake Outcomes:  Diet Advancement/Tolerance  Physical Signs/Symptoms Outcomes:  Biochemical Data, Chewing or Swallowing, Nutrition Focused Physical Findings, Weight     OBJECTIVE DATA: Significant to nutrition assessment  Nutrition Related Findings: LBM4/10.BUE +1; BLE +3; Perineal +2 edema. Na 146 K+5.3(specimen hemolysis), Mg 2.6 Glu 151.  Wounds: Multiple, Pressure Injury  Nutrition Goals: Initiate PO diet, by next RD  assessment     CURRENT NUTRITION THERAPIES  Diet NPO  PO Intake: NPO   PO Supplement Intake:NPO  Additional Sources of Calories/IVF:NS @ 125 ml/hr     COMPARATIVE STANDARDS  Energy (kcal):  9553-7852 (20-25 kcal/CBW)     Protein (g):  105-121 (1.3-1.5 gm/IBW)       Fluid (ml/day):  1 ml/kcal or per MD    ANTHROPOMETRICS  Current Height: 182.9 cm (6')  Current Weight - Scale: 91.2 kg (201 lb 1 oz)    Admission weight: 91.5 kg (201 lb 12.8 oz)    The patient will be monitored per nutrition standards of care. Consult dietitian if additional nutrition interventions are needed prior to RD reassessment.     Kevin Rios RD  Kevin:  [phone]  Office:  [phone]

## 2024-04-10 NOTE — CONSULTS
Ph: [phone], Fax: [phone]           [facility]               Reason for admission:                 Altered mental status with shortness of breath.    Brief Summary :     Tushar Block is being seen by nephrology for acute kidney injury.      Interval History and plan:      Blood pressure is well-controlled.  Urine output is 1100 mL.  Labs are reviewed and BNP is elevated.  CT scan showed bilateral multifocal pneumonia.    Avoid nephrotoxins.  Start IV fluid  Renal panel daily.  Continue broad-spectrum antibiotics.                       Assessment :     Acute kidney injury: BUN on presentation is 85 with a creatinine of 1.2.  High BUN is consistent with severe dehydration in the face of infection.    Ultrasound the kidney showed complex cyst.    He received IV fluid boluses.    He is on broad-spectrum antibiotics for possible pneumonia.  Monitor vancomycin level.      Coronary disease status post CABG.  Hypertension: Blood pressure medicine on hold.  Hyperlipidemia.         [facility] would like to thank Kizzy Mae MD   for opportunity to serve this patient      Please call with questions at-   24 Hrs Answering service [phone] or  7 am- 5 pm via Perfect serve or cell phone  Dr.Muhammad Bernardino Kim MD       HPI :     Tushar Block is a 83 y.o. male presented to   the hospital on 4/9/2024 with altered mental status with shortness of breath.    He has past medical history of congestive heart failure, BPH, history of carotid stenosis, chronic back pain, intermittent acute kidney injuries, history of septic arthritis and atrial fibrillation.  He was brought into the hospital with altered mental status and shortness of breath.  As per the history he was hypoxic with oxygen saturation of 70s.  He was obtunded on arrival.    It is worth mentioning he was recently admitted in March 2024 with sepsis.  He was found to have MDR UTI.  On presentation currently he was tachypneic

## 2024-04-10 NOTE — PROGRESS NOTES
4 Eyes Skin Assessment     NAME:  Tushar Block  YOB: 1940  MEDICAL RECORD NUMBER:  [id number]    The patient is being assessed for  Admission    I agree that at least one RN has performed a thorough Head to Toe Skin Assessment on the patient. ALL assessment sites listed below have been assessed.      Areas assessed by both nurses:    Head, Face, Ears, Shoulders, Back, Chest, Arms, Elbows, Hands, Sacrum. Buttock, Coccyx, Ischium, Legs. Feet and Heels, and Under Medical Devices         Does the Patient have a Wound? Yes wound(s) were present on assessment. LDA wound assessment was Initiated and completed by RN    Nonblanching redness/ scab behind R ear  Bruising BUE  Unstageable R outer calf extending behind  Redness b/l shins  Scab L 4th toe  Blanching redness L great toe  X2 scabs L great toe  Stage II L outer heel  Stage II  R outer heel  Stage II L outer calf   DTI L heel  Redness/ excoriation groin  Ulcer penis  Redess/ excoriation R inner thigh  DTI R heel  Stage II R outer ankle  Stage II between 2nd and 3rd R toes  Stage II between 3rd and 4th R toes  Hard mass RUQ  Rash chin  Excoration extending along entirety of back with open/ bleeding areas  Unstageable coccyx  Non blanching redness R posterior outer thigh with scabbing  Nonblanching redness L posterior outer thigh  DTI R elbow with stage II next to it  Tear R hip  Blanching redness R buttock  Tear L elbow           Terrance Prevention initiated by RN: Yes  Wound Care Orders initiated by RN: Yes    Pressure Injury (Stage 3,4, Unstageable, DTI, NWPT, and Complex wounds) if present, place Wound referral order by RN under : Yes    New Ostomies, if present place, Ostomy referral order under : No     Nurse 1 eSignature: Electronically signed by Amador Motta RN on 4/10/24 at 1:54 AM EDT    **SHARE this note so that the co-signing nurse can place an eSignature**    Nurse 2 eSignature: Electronically signed by Rohini DUKE

## 2024-04-10 NOTE — CONSULTS
[facility] -  Clinical Pharmacy Note    Vancomycin - Management by Pharmacy    Consult Date(s): 4/9/24  Consulting Provider(s): Jaret Vieyra    Assessment / Plan  Aspiration PNA - Vancomycin  Concurrent Antimicrobials: Zosyn  Day of Vanc Therapy / Ordered Duration: day 1 / unspecified  Current Dosing Method: Bayesian-Guided AUC Dosing  Therapeutic Goal: -600 mg/L*hr  Current Dose / Plan:   Vancomycin 2000 mg x 1 dose load ordered and given  Vancomycin 1250 mg q24h ordered ~ predicted AUC48 428 mg/L*hr, AUCSS 481 mg/L*hr, trough 13.4 mg/L  Will continue to monitor clinical condition and make adjustments to regimen as appropriate.    Thank you for consulting pharmacy,    Radha Escamilla, PharmD, [facility] 4/9/2024 9:11 PM        Interval update:  therapy initiation ; Presenting from NH with AMS and SOB    Subjective/Objective:   Tushar Block is a 83 y.o. male with a PMHx significant for osteomyelitis, recurrent MDRO infections, CAD status post CABG, chronic indwelling Iglesias who is admitted with PNA.     Pharmacy is consulted to dose vancomycin.    Ht Readings from Last 1 Encounters:   03/17/24 1.829 m (6' 0.01\")     Wt Readings from Last 1 Encounters:   04/09/24 91.5 kg (201 lb 12.8 oz)     Current & Prior Antimicrobial Regimen(s):  Vancomycin (4/9 - current)  Zosyn (4/9 - current)    Vancomycin Level(s) / Doses:    Date Time Dose Type of Level / Level Interpretation                 Note: Serum levels collected for AUC-based dosing may be high if collected in close proximity to the dose administered. This is not necessarily indicative of toxicity.    Cultures & Sensitivities:    Date Site Micro Susceptibility / Result                 Recent Labs     04/09/24  1414 04/09/24  1903   CREATININE 1.2 1.2   BUN 85* 83*   WBC 12.2*  --        Estimated Creatinine Clearance: 52 mL/min (based on SCr of 1.2 mg/dL).    Additional Lab Values / Findings of Note:    Recent Labs     04/09/24  1526   PROCAL 0.22*

## 2024-04-10 NOTE — PROGRESS NOTES
Attempted to call pt's son, and point of contact, Wilmer Block, however got answering machine twice. Will attempt at a later time.     Ksenia Hoover, DO  Internal Med, PGY-1  04/10/24  11:11 AM

## 2024-04-10 NOTE — PROGRESS NOTES
Clinical Pharmacy Consult Note  Medication History     Admit Date: 4/9/2024    List of of current medications patient is taking is complete. Home Medication list in EPIC updated to reflect changes noted below.    Source of information: I used the patients nursing home transfer papers.    [facility]'s home pharmacy: [facility] [place] - [address] [phone] - F [phone]     Changes made to medication list:   Medications removed: (include reason, ex: therapy completed, patient no longer taking, etc.)  Tramadol- Not on list  Atorvastatin- Not on list  Miconazole- Not on list  Medications added:   Loperamide  Percocet  Tamsulosin  Medication doses adjusted:   None  Other notes:   None    Current Outpatient Medications   Medication Instructions    acetaminophen (TYLENOL) 650 mg, Oral, EVERY 4 HOURS PRN    albuterol sulfate HFA (PROVENTIL HFA) 108 (90 Base) MCG/ACT inhaler 2 puffs, Inhalation, EVERY 6 HOURS PRN    amLODIPine (NORVASC) 5 mg, Oral, DAILY    bisacodyl (DULCOLAX) 10 MG suppository Insert one suppository rectally as needed for constipation, may repeat in 4 hours if no bowel movement.    calcium carbonate (TUMS) 500 MG chewable tablet Take one tablet by mouth with meals for hypocalcemia.    dicyclomine (BENTYL) 20 mg, Oral, EVERY 8 HOURS PRN    furosemide (LASIX) 40 mg, Oral, EVERY MORNING    gabapentin (NEURONTIN) 600 mg, Oral, 2 TIMES DAILY    guaiFENesin (MUCINEX) 600 mg, Oral, 2 TIMES DAILY    levothyroxine (SYNTHROID) 75 mcg, Oral, DAILY    loperamide (IMODIUM) 2 mg, Oral, 4 TIMES DAILY PRN    loratadine (CLARITIN) 10 mg, Oral, DAILY    melatonin 5 mg, Oral, NIGHTLY    menthol-zinc oxide (CALMOSEPTINE) 0.44-20.625 % OINT ointment Apply to penis topically two times a day for excoriation.    Multiple Vitamins-Minerals (THERAPEUTIC MULTIVITAMIN-MINERALS) tablet 1 tablet, Oral, DAILY    Nutritional Supplements (RONEY) PACK 1 packet, Oral, 2 TIMES DAILY    oxyCODONE-acetaminophen

## 2024-04-10 NOTE — PROGRESS NOTES
Internal Medicine Progress Note    Date: 4/10/2024   Patient: Tushar Block   Hospital Day: 1      CC: Altered Mental Status and Shortness of Breath (Pt. Presents to ED via EMS from [facility] with c/o AMS and SOB.)       Interval Hx   Overnight pt continued to retain CO2. He remained on bipap. Morning gas was not much changed.    He was seen and examined at bedside and is still obtunded and on bipap. Hemodynamically stable. Repeat morning abg with some improvement in acidosis and hypercapnia. Pulm consulted.       HPI:   Mr Block is an 82 yo M with a PMHx of CAD s/p CABG, chronic indwelling schneider catheter, pAfib, GERD, and muscle spasticity (on Baclofen pump) who presented to ED on 4/9 for AMS and SOB.       EMS noted pt to be hypoxic to 70s on room air and pt was placed on non rebreather. Pt is unable to provide any history as he is obtunded. Unable to get collateral information at this time.     Recent hospitalization 3/16/24 - 3/21/24 for sepsis. 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 Previous cultures have grown MDR Marganella and Enterococcus. Given increased oxygen requirements, and pt's recent AMS suspected that pt likely had aspiration event leading to PNA. Placed on broad spectrum abx and given IVF. Antihypertensives were held. Linezolid (3/17-3/20) - MRSA neg. Zosyn (3/17-3/21). Switched to Augmentin on discharge to be taken for 3/21 - 3/23. Discharged with 3L O2 requirements.     On presentation, vitals with tachypnea 20s, tachycardia to 100s, and mildly hypertensive to 140/62. Initial VBG shows acidosis 7.252 with hypercapnia 76.6. He was placed on vapotherm. Lab workup showed hyperkalemia of 5.3, azotemia 85, and mild increase in Cr to 1.2 (baseline closer to 1.0). Glucose WNL. Trop 200s stable. CBC with leukocytosis to 12.2. Repeat VBG on vapotherm showed worsening of acidosis now at 7.148, and hypercapnic worsened to 92. Placed on bipap and admitted for  further management of acute hypercapnic respiratory failure.     Note, reached out to pt's point of contact and son, Wilmer, along with other family members regarding code status on admission. Pt was discharged as full code on 3/21 and sent to hospice. Wanted to clarify goals of care, however could not reach son twice. Per ED documentation pt wished to be Limited x4.     ROS   Review of Systems   Reason unable to perform ROS: obtunded.       Objective     Vital Signs:  Patient Vitals for the past 8 hrs:   BP Temp Temp src Pulse Resp SpO2   04/10/24 1000 (!) 130/53 -- -- 99 -- 98 %   04/10/24 0829 -- -- -- 89 -- 99 %   04/10/24 0800 (!) 129/55 -- -- 94 -- 98 %   04/10/24 0730 (!) 124/50 97.8 °F (36.6 °C) Axillary 95 18 100 %   04/10/24 0424 -- -- -- -- 21 100 %   04/10/24 0423 -- -- -- 79 -- 100 %   04/10/24 0330 (!) 98/41 -- -- 83 -- 100 %   04/10/24 0300 (!) 108/41 -- -- 87 -- 100 %       Physical Exam  Constitutional:       Appearance: He is ill-appearing.      Comments: Elderly male. Chronically ill appearing. In distress. Obtunded.    HENT:      Head: Normocephalic and atraumatic.      Mouth/Throat:      Mouth: Mucous membranes are dry.   Eyes:      General: Scleral icterus present.   Cardiovascular:      Rate and Rhythm: Normal rate and regular rhythm.   Pulmonary:      Effort: Respiratory distress present.      Breath sounds: Rhonchi and rales present.      Comments: On bipap  Abdominal:      Comments: protuberant   Musculoskeletal:      Right lower leg: Edema present.      Left lower leg: Edema present.   Skin:     General: Skin is dry.      Comments: Erythema to LLE   Neurological:      Mental Status: He is disoriented.      Comments: obtunded          Labs:  CBC:   Recent Labs     04/09/24  1414 04/10/24  0616   WBC 12.2* 17.3*   HGB 9.2* 9.7*   HCT 29.8* 31.8*    312       BMP:   Recent Labs     04/09/24  1414 04/09/24  1903 04/10/24  0616    141 146*   K 5.3* 5.0 5.3*    103 105   CO2 30

## 2024-04-11 ENCOUNTER — APPOINTMENT (OUTPATIENT)
Dept: GENERAL RADIOLOGY | Age: 84
End: 2024-04-11
Payer: MEDICARE

## 2024-04-11 LAB
ANION GAP SERPL CALCULATED.3IONS-SCNC: 10 MMOL/L (ref 3–16)
BACTERIA UR CULT: ABNORMAL
BASOPHILS # BLD: 0 K/UL (ref 0–0.2)
BASOPHILS NFR BLD: 0 %
BUN SERPL-MCNC: 63 MG/DL (ref 7–20)
CALCIUM SERPL-MCNC: 8.8 MG/DL (ref 8.3–10.6)
CHLORIDE SERPL-SCNC: 112 MMOL/L (ref 99–110)
CO2 SERPL-SCNC: 28 MMOL/L (ref 21–32)
CREAT SERPL-MCNC: 0.8 MG/DL (ref 0.8–1.3)
DEPRECATED RDW RBC AUTO: 19 % (ref 12.4–15.4)
EOSINOPHIL # BLD: 0 K/UL (ref 0–0.6)
EOSINOPHIL NFR BLD: 0 %
GFR SERPLBLD CREATININE-BSD FMLA CKD-EPI: 88 ML/MIN/{1.73_M2}
GLUCOSE SERPL-MCNC: 107 MG/DL (ref 70–99)
HCT VFR BLD AUTO: 29.4 % (ref 40.5–52.5)
HGB BLD-MCNC: 9.1 G/DL (ref 13.5–17.5)
LEGIONELLA AG UR QL: NORMAL
LYMPHOCYTES # BLD: 0.6 K/UL (ref 1–5.1)
LYMPHOCYTES NFR BLD: 3.3 %
MAGNESIUM SERPL-MCNC: 2.6 MG/DL (ref 1.8–2.4)
MCH RBC QN AUTO: 28.3 PG (ref 26–34)
MCHC RBC AUTO-ENTMCNC: 31 G/DL (ref 31–36)
MCV RBC AUTO: 91.3 FL (ref 80–100)
MONOCYTES # BLD: 0.7 K/UL (ref 0–1.3)
MONOCYTES NFR BLD: 4.4 %
MRSA DNA SPEC QL NAA+PROBE: NORMAL
NEUTROPHILS # BLD: 15.4 K/UL (ref 1.7–7.7)
NEUTROPHILS NFR BLD: 92.3 %
ORGANISM: ABNORMAL
PLATELET # BLD AUTO: 259 K/UL (ref 135–450)
PMV BLD AUTO: 8.1 FL (ref 5–10.5)
POTASSIUM SERPL-SCNC: 4.4 MMOL/L (ref 3.5–5.1)
RBC # BLD AUTO: 3.22 M/UL (ref 4.2–5.9)
S PNEUM AG UR QL: NORMAL
SODIUM SERPL-SCNC: 150 MMOL/L (ref 136–145)
WBC # BLD AUTO: 16.7 K/UL (ref 4–11)

## 2024-04-11 PROCEDURE — 85025 COMPLETE CBC W/AUTO DIFF WBC: CPT

## 2024-04-11 PROCEDURE — 94669 MECHANICAL CHEST WALL OSCILL: CPT

## 2024-04-11 PROCEDURE — 2700000000 HC OXYGEN THERAPY PER DAY

## 2024-04-11 PROCEDURE — 2580000003 HC RX 258: Performed by: INTERNAL MEDICINE

## 2024-04-11 PROCEDURE — 92526 ORAL FUNCTION THERAPY: CPT

## 2024-04-11 PROCEDURE — 92611 MOTION FLUOROSCOPY/SWALLOW: CPT

## 2024-04-11 PROCEDURE — 6360000002 HC RX W HCPCS

## 2024-04-11 PROCEDURE — 94761 N-INVAS EAR/PLS OXIMETRY MLT: CPT

## 2024-04-11 PROCEDURE — 2580000003 HC RX 258: Performed by: STUDENT IN AN ORGANIZED HEALTH CARE EDUCATION/TRAINING PROGRAM

## 2024-04-11 PROCEDURE — 94660 CPAP INITIATION&MGMT: CPT

## 2024-04-11 PROCEDURE — 74230 X-RAY XM SWLNG FUNCJ C+: CPT

## 2024-04-11 PROCEDURE — 80048 BASIC METABOLIC PNL TOTAL CA: CPT

## 2024-04-11 PROCEDURE — 94640 AIRWAY INHALATION TREATMENT: CPT

## 2024-04-11 PROCEDURE — 2580000003 HC RX 258

## 2024-04-11 PROCEDURE — 83735 ASSAY OF MAGNESIUM: CPT

## 2024-04-11 PROCEDURE — 2060000000 HC ICU INTERMEDIATE R&B

## 2024-04-11 PROCEDURE — 92610 EVALUATE SWALLOWING FUNCTION: CPT

## 2024-04-11 PROCEDURE — 6370000000 HC RX 637 (ALT 250 FOR IP)

## 2024-04-11 PROCEDURE — 99233 SBSQ HOSP IP/OBS HIGH 50: CPT | Performed by: INTERNAL MEDICINE

## 2024-04-11 PROCEDURE — 36415 COLL VENOUS BLD VENIPUNCTURE: CPT

## 2024-04-11 PROCEDURE — 6360000002 HC RX W HCPCS: Performed by: STUDENT IN AN ORGANIZED HEALTH CARE EDUCATION/TRAINING PROGRAM

## 2024-04-11 RX ORDER — SODIUM CHLORIDE 450 MG/100ML
INJECTION, SOLUTION INTRAVENOUS CONTINUOUS
Status: DISCONTINUED | OUTPATIENT
Start: 2024-04-11 | End: 2024-04-12 | Stop reason: HOSPADM

## 2024-04-11 RX ADMIN — SODIUM CHLORIDE: 9 INJECTION, SOLUTION INTRAVENOUS at 02:16

## 2024-04-11 RX ADMIN — SODIUM CHLORIDE 30 MG/ML INHALATION SOLUTION 4 ML: 30 SOLUTION INHALANT at 16:11

## 2024-04-11 RX ADMIN — ALBUTEROL SULFATE 2.5 MG: 2.5 SOLUTION RESPIRATORY (INHALATION) at 12:16

## 2024-04-11 RX ADMIN — SODIUM CHLORIDE: 4.5 INJECTION, SOLUTION INTRAVENOUS at 21:47

## 2024-04-11 RX ADMIN — ARFORMOTEROL TARTRATE 15 MCG: 15 SOLUTION RESPIRATORY (INHALATION) at 20:26

## 2024-04-11 RX ADMIN — SODIUM CHLORIDE, PRESERVATIVE FREE 10 ML: 5 INJECTION INTRAVENOUS at 09:14

## 2024-04-11 RX ADMIN — ALBUTEROL SULFATE 2.5 MG: 2.5 SOLUTION RESPIRATORY (INHALATION) at 20:26

## 2024-04-11 RX ADMIN — PIPERACILLIN AND TAZOBACTAM 3375 MG: 3; .375 INJECTION, POWDER, LYOPHILIZED, FOR SOLUTION INTRAVENOUS at 02:17

## 2024-04-11 RX ADMIN — SODIUM CHLORIDE 30 MG/ML INHALATION SOLUTION 4 ML: 30 SOLUTION INHALANT at 08:51

## 2024-04-11 RX ADMIN — Medication: at 09:14

## 2024-04-11 RX ADMIN — PIPERACILLIN AND TAZOBACTAM 3375 MG: 3; .375 INJECTION, POWDER, LYOPHILIZED, FOR SOLUTION INTRAVENOUS at 17:46

## 2024-04-11 RX ADMIN — SODIUM CHLORIDE 30 MG/ML INHALATION SOLUTION 4 ML: 30 SOLUTION INHALANT at 12:16

## 2024-04-11 RX ADMIN — ALBUTEROL SULFATE 2.5 MG: 2.5 SOLUTION RESPIRATORY (INHALATION) at 08:52

## 2024-04-11 RX ADMIN — SODIUM CHLORIDE 30 MG/ML INHALATION SOLUTION 4 ML: 30 SOLUTION INHALANT at 20:26

## 2024-04-11 RX ADMIN — BUDESONIDE 250 MCG: 0.25 INHALANT RESPIRATORY (INHALATION) at 08:51

## 2024-04-11 RX ADMIN — ARFORMOTEROL TARTRATE 15 MCG: 15 SOLUTION RESPIRATORY (INHALATION) at 08:52

## 2024-04-11 RX ADMIN — ENOXAPARIN SODIUM 40 MG: 100 INJECTION SUBCUTANEOUS at 09:12

## 2024-04-11 RX ADMIN — ALBUTEROL SULFATE 2.5 MG: 2.5 SOLUTION RESPIRATORY (INHALATION) at 16:11

## 2024-04-11 RX ADMIN — BUDESONIDE 250 MCG: 0.25 INHALANT RESPIRATORY (INHALATION) at 20:26

## 2024-04-11 RX ADMIN — Medication: at 21:39

## 2024-04-11 RX ADMIN — SODIUM CHLORIDE: 4.5 INJECTION, SOLUTION INTRAVENOUS at 09:17

## 2024-04-11 RX ADMIN — PIPERACILLIN AND TAZOBACTAM 3375 MG: 3; .375 INJECTION, POWDER, LYOPHILIZED, FOR SOLUTION INTRAVENOUS at 09:10

## 2024-04-11 RX ADMIN — ONDANSETRON 4 MG: 4 TABLET, ORALLY DISINTEGRATING ORAL at 09:30

## 2024-04-11 ASSESSMENT — ENCOUNTER SYMPTOMS
COUGH: 1
BACK PAIN: 1
SHORTNESS OF BREATH: 1

## 2024-04-11 NOTE — PROGRESS NOTES
The patient was taken off of the BiPAP for a break. His face was washed and his mouth was cleaned. The patient is following commands at this time. The nurse is aware and will notify respiratory if the patient becomes altered and needs to be placed back on the BiPAP.

## 2024-04-11 NOTE — PROGRESS NOTES
Podiatric Surgery Daily Progress Note  Tushar Block      Subjective :   Patient seen and examined this am at the bedside.  Patient seems to be much more oriented today than previous interaction.  Notably, patient is off BiPAP at this juncture.  However, patient is still unable to fully elaborate on proposed questions. Patient denies N/V/F/C/SOB. Patient denies calf pain, thigh pain, chest pain.     Review of Systems: A 12 point review of symptoms is unremarkable with the exception of the chief complaint. Patient specifically denies nausea, fever, vomiting, chills, shortness of breath, chest pain, abdominal pain, constipation or difficulty urinating.       Objective     BP (!) 127/56   Pulse 100   Temp 98.1 °F (36.7 °C) (Oral)   Resp 20   Ht 1.829 m (6')   Wt 90.9 kg (200 lb 8.1 oz)   SpO2 96%   BMI 27.19 kg/m²      I/O:  Intake/Output Summary (Last 24 hours) at 4/11/2024 0943  Last data filed at 4/11/2024 0600  Gross per 24 hour   Intake 2289.66 ml   Output 2175 ml   Net 114.66 ml              Wt Readings from Last 3 Encounters:   04/11/24 90.9 kg (200 lb 8.1 oz)   03/21/24 95.6 kg (210 lb 12.2 oz)   03/06/24 95.6 kg (210 lb 12.2 oz)       LABS:    Recent Labs     04/10/24  0616 04/11/24  0647   WBC 17.3* 16.7*   HGB 9.7* 9.1*   HCT 31.8* 29.4*    259        Recent Labs     04/11/24  0647   *   K 4.4   *   CO2 28   BUN 63*   CREATININE 0.8        Recent Labs     04/09/24  1414   PROT 8.1           LOWER EXTREMITY EXAMINATION    Dressing to bilateral LE intact. No strikethrough noted to the external dressing.  Moderate drainage noted to the internal layers of the dressing.     VASCULAR: DP and PT pulses are weakly palpable.  Previous hand-held Doppler examination revealed DP/PT pulses were biphasic bilaterally CFT is brisk to the digits of the foot b/l. Skin temperature is warm to cool from proximal to distal with no focal calor noted.  +2 pitting edema bilateral lower extremities, notably

## 2024-04-11 NOTE — PROGRESS NOTES
Speech Language Pathology  Facility/Department:[facility]  Bedside Swallow Evaluation                                                       Name: Tushar Block  : 1940  MRN: [id number]    Patient Diagnosis(es):   Patient Active Problem List    Diagnosis Date Noted    Normocytic anemia 2022    Electrolyte imbalance     Weight loss counseling, encounter for     Elevated sed rate     Elevated C-reactive protein (CRP)     Infection requiring contact isolation precautions     Class 1 obesity due to excess calories with body mass index (BMI) of 30.0 to 30.9 in adult     Chronic multifocal osteomyelitis of right foot (HCC) 2022    MRSA infection 2022    Septic arthritis of interphalangeal joint of toe of right foot (HCC) 2022    Osteomyelitis (HCC) 2022    Multiple drug resistant organism (MDRO) culture positive 2022    Pneumonia of both lower lobes due to infectious organism 04/10/2024    Acute on chronic respiratory failure with hypoxia and hypercapnia ([facility]) 04/10/2024    Acute hypoxemic respiratory failure (HCC) 2024    Uremic encephalopathy 2024    Transient unconsciousness 2024    Venous stasis ulcer of right calf limited to breakdown of skin without varicose veins ([facility]) 2024    Sepsis ([facility]) 2024    Loculated pleural effusion 2024    Round atelectasis 2024    Sepsis secondary to UTI ([facility]) 2024    Symptomatic sinus bradycardia 2022    Bilateral leg edema 2022    Symptomatic bradycardia     Weakness 2022    Pain of right lower extremity     Degloving injury     Leg laceration, unspecified laterality, initial encounter 10/31/2021    Altered mental status     Hyperkalemia     Encephalopathy acute 2021    Acute renal failure superimposed on stage 3 chronic kidney disease (HCC) 02/15/2021    Urinary tract infection associated with indwelling urethral catheter (HCC) 02/15/2021    Acute cystitis  due to infectious organism 4/10/2024    Renal insufficiency     Septic arthritis of interphalangeal joint of toe of right foot (HCC) 05/27/2022    Systolic congestive heart failure (HCC)     Vitamin B12 deficiency      Past Surgical History:   Procedure Laterality Date    BACK SURGERY  2006    lower lumbar    BRONCHOSCOPY N/A 1/4/2024    BRONCHOSCOPY/TRANSBRONCHIAL LUNG BIOPSY/ CRYOPROBE BX/  BRUSHING /BAL performed by Nathaniel Celeste MD at [facility] ENDOSCOPY    CORONARY ARTERY BYPASS GRAFT  12/13/2013    CABG x 5 (Dr Green), svg to diag, om1 and 3, distal rca, kelly to lad.     CYSTOSCOPY N/A 1/10/2019    CYSTOSCOPY performed by Elia Mcfarland MD at [facility] OR    FOOT DEBRIDEMENT Left 5/29/2022    LEFT FOOT PARTIAL FIFTH RAY RESECTION WITH EXCISIONAL DEBRIDEMENT OF MUSCLE AND FASCIA, WITH APPLICATION OF GRAFT performed by Arnie Kruger DPM at [facility] OR    FOOT DEBRIDEMENT Right 9/2/2022    INCISION AND DRAINAGE PARTIAL 5TH RAY RESECTION RIGHT FOOT performed by Fauzia Richardson DPM at [facility] OR    FOOT SURGERY      HIP SURGERY Right 5/1/2015    ORIF    LEG SURGERY Right 11/2/2021    RIGHT LOWER EXTREMITY ADVANCEMENT FLAP AND SPLIT THICKNESS SKIN GRAFT PLACEMENT; (WOUND- 10 CM X 5.5 CM; CLOSURE- 6 CM X 5.5 CM; SKIN GRAFT- 7.2 CM X 5 CM) performed by Christiano Seymour MD at [facility] OR    SIGMOIDOSCOPY N/A 6/11/2020    SIGMOIDOSCOPY DIAGNOSTIC FLEXIBLE performed by Rex VALENTINE MD at [facility] ENDOSCOPY    TOE AMPUTATION Right 9/2/2022    . performed by Fauzia Richardson DPM at [facility] OR    UPPER GASTROINTESTINAL ENDOSCOPY N/A 5/4/2020    EGD BIOPSY performed by Rex VALENTINE MD at [facility] ENDOSCOPY    UPPER GASTROINTESTINAL ENDOSCOPY N/A 9/5/2022    EGD BIOPSY performed by Rex VALENTINE MD at [facility] ENDOSCOPY       Reason for Referral:  Tushar Block  was referred for a Speech Therapy evaluation to assess swallow function.    History of Present Illness  Per admitting H&P: 04/09/2024  '84 yo M with a PMHx of CAD s/p CABG,

## 2024-04-11 NOTE — PROGRESS NOTES
Ph: [phone], Fax: [phone]           [facility]               Reason for admission:                 Altered mental status with shortness of breath.    Brief Summary :     Tushar Block is being seen by nephrology for acute kidney injury.      Interval History and plan:      Blood pressure is well-controlled.  Urine output is 2175 mL.  BUN is 84 > 63   Creatinine is 1.2 > 0.8  Labs are reviewed and BNP is elevated.  CT scan showed bilateral multifocal pneumonia.    Avoid nephrotoxins.  Start IVF for worsening hypernatremia   Renal panel daily.  On broad-spectrum antibiotics.                       Assessment :     Acute kidney injury: BUN on presentation is 85 with a creatinine of 1.2.  High BUN is consistent with severe dehydration in the face of infection.    Ultrasound the kidney showed complex cyst.    He received IV fluid boluses.    He is on broad-spectrum antibiotics for possible pneumonia.  Monitor vancomycin level.      Coronary disease status post CABG.  Hypertension: Blood pressure medicine on hold.  Hyperlipidemia.         [facility] would like to thank Kizzy Mae MD   for opportunity to serve this patient      Please call with questions at-   24 Hrs Answering service [phone] or  7 am- 5 pm via Perfect serve or cell phone  Dr.Muhammad Bernardino Kim MD       HPI :     Tushar Block is a 83 y.o. male presented to   the hospital on 4/9/2024 with altered mental status with shortness of breath.    He has past medical history of congestive heart failure, BPH, history of carotid stenosis, chronic back pain, intermittent acute kidney injuries, history of septic arthritis and atrial fibrillation.  He was brought into the hospital with altered mental status and shortness of breath.  As per the history he was hypoxic with oxygen saturation of 70s.  He was obtunded on arrival.    It is worth mentioning he was recently admitted in March 2024 with sepsis.  He was found to

## 2024-04-11 NOTE — CONSULTS
Clinical Pharmacy Progress Note    Vancomycin has been discontinued. Will sign off pharmacy to dose Vancomycin consult.  If medication is restarted and pharmacy is to manage dosing, please re-consult at that time.    Please call with questions--  Thanks--  Pearl HullD, BCPS, BCGP  [id number] (\[facility] [place] [facility]\"")   4/11/2024 7:12 AM

## 2024-04-11 NOTE — PROGRESS NOTES
Palliative Care Chart Review  and Check in Note:     NAME:  Tushar Block  Admit Date: 4/9/2024  Hospital Day:  Hospital Day: 3   Current Code status: Limited    Palliative care is continuing to following Mr. Block for symptom management,  and goals of care discussion as needed. Patient's chart reviewed today 4/11/24.      Saw pt at the bedside. He is off Bipap this morning. He is alert, oriented to self, time and place but very limited insight into his situation.       The following are the currently established goals/code status, and Symptom management.     Goals of care: Plan to re-address tomorrow with pt's son/HCPOA in person. At this time family wants to continue with standard medical treatment. They do not want intubation if his breathing worsens or resuscitation if his heart stops.     Code status: Limited x4    Discharge plan: NBA, pt from assisted living with [facility]      TIM Coleman CNP  04/11/24  9:50 AM

## 2024-04-11 NOTE — PLAN OF CARE
Problem: Discharge Planning  Goal: Discharge to home or other facility with appropriate resources  4/10/2024 2159 by Margie Frazier RN  Outcome: Progressing  Flowsheets (Taken 4/10/2024 2159)  Discharge to home or other facility with appropriate resources:   Identify barriers to discharge with patient and caregiver   Arrange for needed discharge resources and transportation as appropriate   Identify discharge learning needs (meds, wound care, etc)     Problem: Pain  Goal: Verbalizes/displays adequate comfort level or baseline comfort level  4/10/2024 2159 by Margie Frazier RN  Flowsheets (Taken 4/10/2024 2159)  Verbalizes/displays adequate comfort level or baseline comfort level:   Encourage patient to monitor pain and request assistance   Assess pain using appropriate pain scale   Administer analgesics based on type and severity of pain and evaluate response   Implement non-pharmacological measures as appropriate and evaluate response   Consider cultural and social influences on pain and pain management   Notify Licensed Independent Practitioner if interventions unsuccessful or patient reports new pain     Problem: Chronic Conditions and Co-morbidities  Goal: Patient's chronic conditions and co-morbidity symptoms are monitored and maintained or improved  4/10/2024 2159 by Margie Frazier RN  Outcome: Progressing  Care Plan - Patient's Chronic Conditions and Co-Morbidity Symptoms are Monitored and Maintained or Improved:   Monitor and assess patient's chronic conditions and comorbid symptoms for stability, deterioration, or improvement   Collaborate with multidisciplinary team to address chronic and comorbid conditions and prevent exacerbation or deterioration   Update acute care plan with appropriate goals if chronic or comorbid symptoms are exacerbated and prevent overall improvement and discharge

## 2024-04-11 NOTE — PROGRESS NOTES
Pulmonary Progress Note  PGY 3      CC Empyema    History Obtained From:  patient    Interval History: Pt alert and oriented to person, place, time situation. Of BiPaP overnight on 6L NC. States feels SOB, cont to have productive cough. Denies fevers/chills    HISTORY OF PRESENT ILLNESS:  83 M with PMH of osteomyelitis, recurrent MDRO infections, CAD s/p CABG, chronic indwelling Iglesias catheter presenting to the ED for AMS SOB.  Per nursing report to EMS patient noted to be more altered and having increased work of breathing starting earlier today.  EMS noted him to be hypoxic to 70s on room air, was placed on nonrebreather.     On initial presentation to the ED patient was tachypneic in the 20s, tachycardic 200s, mildly hypertensive to 140/62.  VBG showed pH 7.252/76.6.  Patient was placed on Vapotherm.CBC showed leukocytosis 1222.  Patient was placed on Vapotherm showed worsening acidosis.  Then placed on BiPAP and admitted.    Of note patient was seen by Dr. Celeste during hospitalization in January 2024 for loculated pleural effusion.  It was thought that this left lower lobe lesion was likely rounded atelectasis, this was present dating back to 2017.  Bronchoscopy was done with biopsies showing respiratory Mucosa with nonspecific reactive inflammatory changes.      Review of Systems   Constitutional:  Negative for chills and fever.   Respiratory:  Positive for cough and shortness of breath.      Physical Exam  Constitutional:       Appearance: Normal appearance.   HENT:      Head: Normocephalic and atraumatic.   Cardiovascular:      Rate and Rhythm: Regular rhythm. Tachycardia present.   Pulmonary:      Effort: Pulmonary effort is normal.      Breath sounds: Rhonchi present.   Abdominal:      General: Abdomen is flat.      Palpations: Abdomen is soft.   Musculoskeletal:      Right lower leg: Edema present.      Left lower leg: Edema present.   Skin:     Comments: Extensive wounds on sacrum and upperback

## 2024-04-11 NOTE — PROGRESS NOTES
Internal Medicine Progress Note    Date: 4/11/2024   Patient: Tushar Block   Hospital Day: 2      CC: Altered Mental Status and Shortness of Breath (Pt. Presents to ED via EMS from [facility] with c/o AMS and SOB.)       Interval Hx   Bipap break overnight and placed on 6L HFNC. This morning he is more alert, oriented to self and place. Hemodynamically stable, and maintaining good O2 sat on 6L HF. Tele reveals sinus tach.     MRSA neg --> d/c vanc.   Labs reveal improvement in azotemia with fluids.     Palliative on board, and ongoing goals of care discussion taking place with pt's son, Wilmer. Considering comfort care on Friday with plans to d/c back to hospice.       HPI:   Mr Block is an 82 yo M with a PMHx of CAD s/p CABG, chronic indwelling schneider catheter, pAfib, GERD, and muscle spasticity (on Baclofen pump) who presented to ED on 4/9 for AMS and SOB.       EMS noted pt to be hypoxic to 70s on room air and pt was placed on non rebreather. Pt is unable to provide any history as he is obtunded. Unable to get collateral information at this time.     Recent hospitalization 3/16/24 - 3/21/24 for sepsis. 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 Previous cultures have grown MDR Marganella and Enterococcus. Given increased oxygen requirements, and pt's recent AMS suspected that pt likely had aspiration event leading to PNA. Placed on broad spectrum abx and given IVF. Antihypertensives were held. Linezolid (3/17-3/20) - MRSA neg. Zosyn (3/17-3/21). Switched to Augmentin on discharge to be taken for 3/21 - 3/23. Discharged with 3L O2 requirements.     On presentation, vitals with tachypnea 20s, tachycardia to 100s, and mildly hypertensive to 140/62. Initial VBG shows acidosis 7.252 with hypercapnia 76.6. He was placed on vapotherm. Lab workup showed hyperkalemia of 5.3, azotemia 85, and mild increase in Cr to 1.2 (baseline closer to 1.0). Glucose WNL. Trop 200s stable. CBC with    Skin:     General: Skin is dry.      Comments: Erythema to LLE   Neurological:      Mental Status: He is disoriented.      Comments: At baseline mentation.          Labs:  CBC:   Recent Labs     04/09/24  1414 04/10/24  0616 04/11/24  0647   WBC 12.2* 17.3* 16.7*   HGB 9.2* 9.7* 9.1*   HCT 29.8* 31.8* 29.4*    312 259         BMP:   Recent Labs     04/09/24  1414 04/09/24  1903 04/10/24  0616    141 146*   K 5.3* 5.0 5.3*    103 105   CO2 30 29 25   BUN 85* 83* 84*   CREATININE 1.2 1.2 1.1   GLUCOSE 130* 219* 151*       Magnesium:   Recent Labs     04/10/24  0016 04/10/24  0616   MG 2.60* 2.60*       LFT's:   Recent Labs     04/09/24  1414   AST 14*   ALT 8*   BILITOT 0.3   ALKPHOS 15*           U/A:   Recent Labs     04/10/24  1230   COLORU Yellow   PHUR 6.0   WBCUA *   RBCUA 5-10*   YEAST Present*   BACTERIA 2+*   CLARITYU Clear   SPECGRAV 1.020   LEUKOCYTESUR LARGE*   UROBILINOGEN 0.2   BILIRUBINUR Negative   BLOODU SMALL*   GLUCOSEU Negative   AMORPHOUS 1+       Radiology:  CT CHEST WO CONTRAST   Final Result      1.  Noncontrast CT with unchanged appearance of left lower lobe empyema or   abscess and bilateral multifocal pneumonia. Recommend IV contrast on future   examinations for better evaluation.         XR CHEST PORTABLE   Final Result   1.  Pulmonary opacities are present in the right lung concerning for pneumonia.            Assessment & Plan   Mr Block is an 82 yo M with a PMHx of CAD s/p CABG, chronic indwelling schneider catheter, pAfib, GERD, and muscle spasticity (on Baclofen pump) who presented to ED on 4/9 for AMS and SOB.          Acute on chronic hypoxic, hypercapnic respiratory failure 2/2 PNA - improving  On arrival pt is in respiratory distress requiring vapotherm. Was switched to bipap. Gas remained acidosis with pH <7.2. Pt is also retaining CO2 >80. CXR c/w RLL PNA.  CT chest - Similar to prior with LLL empyema, RLL consolidation    - Broad spectrum abx   - Zosyn for

## 2024-04-11 NOTE — PLAN OF CARE
Problem: SLP Adult - Impaired Swallowing  Goal: By Discharge: Advance to least restrictive diet without signs or symptoms of aspiration for planned discharge setting.  See evaluation for individualized goals.  4/11/2024 1103 by Sierra Marti, SLP  Outcome: Progressing

## 2024-04-11 NOTE — PROCEDURES
INSTRUMENTAL SWALLOW REPORT  MODIFIED BARIUM SWALLOW  Speech Therapy Note    NAME: Tushar Block     : 1940  MRN: [id number]       Date of Eval: 2024     Ordering Physician: Dr. Kizzy Mae  Referring Diagnosis: Dysphagia    Past Medical History:  has a past medical history of BPH (benign prostatic hyperplasia), C. difficile colitis, Carotid stenosis, Chronic back pain, Encounter for imaging to screen for metal prior to MRI, GERD (gastroesophageal reflux disease), History of atrial fibrillation, Hypertension, Lower GI bleed, MDRO (multiple drug resistant organisms) resistance, Neuromuscular disorder (HCC), Pneumonia of both lower lobes due to infectious organism, Renal insufficiency, Septic arthritis of interphalangeal joint of toe of right foot (HCC), Systolic congestive heart failure (HCC), and Vitamin B12 deficiency.  Past Surgical History:  has a past surgical history that includes back surgery (); Foot surgery; Coronary artery bypass graft (2013); hip surgery (Right, 2015); Cystoscopy (N/A, 1/10/2019); Upper gastrointestinal endoscopy (N/A, 2020); sigmoidoscopy (N/A, 2020); Leg Surgery (Right, 2021); Foot Debridement (Left, 2022); Foot Debridement (Right, 2022); Toe amputation (Right, 2022); Upper gastrointestinal endoscopy (N/A, 2022); and bronchoscopy (N/A, 2024).    CXR: 2024  1. Pulmonary opacities are present in the right lung concerning for pneumonia.     Prior MBS Results: 2024  Oral Phase  Mild dysfunction in setting of reduced dentition, characterized by prolonged mastication of solid. Adequate recollection, no significant stasis.  Pharyngeal Phase  Grossly WFL, with timely and appropriate hyolaryngeal mechanics and good airway protection maintained. No aspiration or penetration for any consistency. Mild intermittent diffuse stasis; cleared with secondary swallow.  Upper Esophageal Screen  Indirectly assessed, appeared

## 2024-04-12 VITALS
BODY MASS INDEX: 26.9 KG/M2 | OXYGEN SATURATION: 96 % | HEIGHT: 72 IN | HEART RATE: 89 BPM | WEIGHT: 198.63 LBS | SYSTOLIC BLOOD PRESSURE: 149 MMHG | TEMPERATURE: 98 F | DIASTOLIC BLOOD PRESSURE: 67 MMHG | RESPIRATION RATE: 20 BRPM

## 2024-04-12 LAB
ANION GAP SERPL CALCULATED.3IONS-SCNC: 10 MMOL/L (ref 3–16)
BASOPHILS # BLD: 0.1 K/UL (ref 0–0.2)
BASOPHILS NFR BLD: 0.4 %
BUN SERPL-MCNC: 41 MG/DL (ref 7–20)
CALCIUM SERPL-MCNC: 9.1 MG/DL (ref 8.3–10.6)
CHLORIDE SERPL-SCNC: 113 MMOL/L (ref 99–110)
CO2 SERPL-SCNC: 30 MMOL/L (ref 21–32)
CREAT SERPL-MCNC: 0.7 MG/DL (ref 0.8–1.3)
DEPRECATED RDW RBC AUTO: 19.3 % (ref 12.4–15.4)
EOSINOPHIL # BLD: 0 K/UL (ref 0–0.6)
EOSINOPHIL NFR BLD: 0.3 %
GFR SERPLBLD CREATININE-BSD FMLA CKD-EPI: >90 ML/MIN/{1.73_M2}
GLUCOSE SERPL-MCNC: 119 MG/DL (ref 70–99)
HCT VFR BLD AUTO: 30.8 % (ref 40.5–52.5)
HGB BLD-MCNC: 9.3 G/DL (ref 13.5–17.5)
LYMPHOCYTES # BLD: 0.9 K/UL (ref 1–5.1)
LYMPHOCYTES NFR BLD: 5 %
MAGNESIUM SERPL-MCNC: 2.5 MG/DL (ref 1.8–2.4)
MCH RBC QN AUTO: 28 PG (ref 26–34)
MCHC RBC AUTO-ENTMCNC: 30.3 G/DL (ref 31–36)
MCV RBC AUTO: 92.5 FL (ref 80–100)
MONOCYTES # BLD: 0.5 K/UL (ref 0–1.3)
MONOCYTES NFR BLD: 2.9 %
NEUTROPHILS # BLD: 16.2 K/UL (ref 1.7–7.7)
NEUTROPHILS NFR BLD: 91.4 %
PLATELET # BLD AUTO: 298 K/UL (ref 135–450)
PMV BLD AUTO: 8.1 FL (ref 5–10.5)
POTASSIUM SERPL-SCNC: 3.6 MMOL/L (ref 3.5–5.1)
RBC # BLD AUTO: 3.33 M/UL (ref 4.2–5.9)
SODIUM SERPL-SCNC: 149 MMOL/L (ref 136–145)
SODIUM SERPL-SCNC: 153 MMOL/L (ref 136–145)
WBC # BLD AUTO: 17.7 K/UL (ref 4–11)

## 2024-04-12 PROCEDURE — 2700000000 HC OXYGEN THERAPY PER DAY

## 2024-04-12 PROCEDURE — 85025 COMPLETE CBC W/AUTO DIFF WBC: CPT

## 2024-04-12 PROCEDURE — 94640 AIRWAY INHALATION TREATMENT: CPT

## 2024-04-12 PROCEDURE — 80048 BASIC METABOLIC PNL TOTAL CA: CPT

## 2024-04-12 PROCEDURE — 6360000002 HC RX W HCPCS

## 2024-04-12 PROCEDURE — 6360000002 HC RX W HCPCS: Performed by: STUDENT IN AN ORGANIZED HEALTH CARE EDUCATION/TRAINING PROGRAM

## 2024-04-12 PROCEDURE — 6370000000 HC RX 637 (ALT 250 FOR IP)

## 2024-04-12 PROCEDURE — 99232 SBSQ HOSP IP/OBS MODERATE 35: CPT | Performed by: NURSE PRACTITIONER

## 2024-04-12 PROCEDURE — 36415 COLL VENOUS BLD VENIPUNCTURE: CPT

## 2024-04-12 PROCEDURE — 83735 ASSAY OF MAGNESIUM: CPT

## 2024-04-12 PROCEDURE — 99232 SBSQ HOSP IP/OBS MODERATE 35: CPT | Performed by: INTERNAL MEDICINE

## 2024-04-12 PROCEDURE — 2580000003 HC RX 258

## 2024-04-12 PROCEDURE — 84295 ASSAY OF SERUM SODIUM: CPT

## 2024-04-12 PROCEDURE — 94761 N-INVAS EAR/PLS OXIMETRY MLT: CPT

## 2024-04-12 PROCEDURE — 2580000003 HC RX 258: Performed by: STUDENT IN AN ORGANIZED HEALTH CARE EDUCATION/TRAINING PROGRAM

## 2024-04-12 PROCEDURE — 94669 MECHANICAL CHEST WALL OSCILL: CPT

## 2024-04-12 RX ORDER — AMLODIPINE BESYLATE 5 MG/1
5 TABLET ORAL DAILY
Status: DISCONTINUED | OUTPATIENT
Start: 2024-04-12 | End: 2024-04-12 | Stop reason: HOSPADM

## 2024-04-12 RX ADMIN — PIPERACILLIN AND TAZOBACTAM 3375 MG: 3; .375 INJECTION, POWDER, LYOPHILIZED, FOR SOLUTION INTRAVENOUS at 02:07

## 2024-04-12 RX ADMIN — ALBUTEROL SULFATE 2.5 MG: 2.5 SOLUTION RESPIRATORY (INHALATION) at 09:50

## 2024-04-12 RX ADMIN — Medication: at 09:58

## 2024-04-12 RX ADMIN — SODIUM CHLORIDE 30 MG/ML INHALATION SOLUTION 4 ML: 30 SOLUTION INHALANT at 09:50

## 2024-04-12 RX ADMIN — ALBUTEROL SULFATE 2.5 MG: 2.5 SOLUTION RESPIRATORY (INHALATION) at 15:58

## 2024-04-12 RX ADMIN — ARFORMOTEROL TARTRATE 15 MCG: 15 SOLUTION RESPIRATORY (INHALATION) at 09:50

## 2024-04-12 RX ADMIN — AMLODIPINE BESYLATE 5 MG: 5 TABLET ORAL at 13:00

## 2024-04-12 RX ADMIN — BUDESONIDE 250 MCG: 0.25 INHALANT RESPIRATORY (INHALATION) at 09:50

## 2024-04-12 RX ADMIN — SODIUM CHLORIDE 30 MG/ML INHALATION SOLUTION 4 ML: 30 SOLUTION INHALANT at 15:59

## 2024-04-12 RX ADMIN — SODIUM CHLORIDE: 4.5 INJECTION, SOLUTION INTRAVENOUS at 13:00

## 2024-04-12 RX ADMIN — ALBUTEROL SULFATE 2.5 MG: 2.5 SOLUTION RESPIRATORY (INHALATION) at 12:11

## 2024-04-12 RX ADMIN — SODIUM CHLORIDE 30 MG/ML INHALATION SOLUTION 4 ML: 30 SOLUTION INHALANT at 12:11

## 2024-04-12 RX ADMIN — ENOXAPARIN SODIUM 40 MG: 100 INJECTION SUBCUTANEOUS at 09:00

## 2024-04-12 RX ADMIN — PIPERACILLIN AND TAZOBACTAM 3375 MG: 3; .375 INJECTION, POWDER, LYOPHILIZED, FOR SOLUTION INTRAVENOUS at 13:11

## 2024-04-12 ASSESSMENT — ENCOUNTER SYMPTOMS
SHORTNESS OF BREATH: 1
BACK PAIN: 1
COUGH: 1

## 2024-04-12 ASSESSMENT — PULMONARY FUNCTION TESTS: PEFR_L/MIN: 99

## 2024-04-12 NOTE — PROGRESS NOTES
Internal Medicine Progress Note    Date: 4/12/2024   Patient: Tushar Block   Hospital Day: 3      CC: Altered Mental Status and Shortness of Breath (Pt. Presents to ED via EMS from [place] with c/o AMS and SOB.)       Interval Hx   Bipap break overnight and placed on 6L HFNC. This morning he is more alert, oriented to self and place. Hemodynamically stable, and maintaining good O2 sat on 6L HF. Tele reveals sinus tach.     MRSA neg --> d/c vanc.   Labs reveal improvement in azotemia with fluids.     Palliative on board, and ongoing goals of care discussion taking place with pt's son, Wilmer. Considering comfort care on Friday with plans to d/c back to hospice.       HPI:   Mr Block is an 82 yo M with a PMHx of CAD s/p CABG, chronic indwelling schneider catheter, pAfib, GERD, and muscle spasticity (on Baclofen pump) who presented to ED on 4/9 for AMS and SOB.       EMS noted pt to be hypoxic to 70s on room air and pt was placed on non rebreather. Pt is unable to provide any history as he is obtunded. Unable to get collateral information at this time.     Recent hospitalization 3/16/24 - 3/21/24 for sepsis. 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 Previous cultures have grown MDR Marganella and Enterococcus. Given increased oxygen requirements, and pt's recent AMS suspected that pt likely had aspiration event leading to PNA. Placed on broad spectrum abx and given IVF. Antihypertensives were held. Linezolid (3/17-3/20) - MRSA neg. Zosyn (3/17-3/21). Switched to Augmentin on discharge to be taken for 3/21 - 3/23. Discharged with 3L O2 requirements.     On presentation, vitals with tachypnea 20s, tachycardia to 100s, and mildly hypertensive to 140/62. Initial VBG shows acidosis 7.252 with hypercapnia 76.6. He was placed on vapotherm. Lab workup showed hyperkalemia of 5.3, azotemia 85, and mild increase in Cr to 1.2 (baseline closer to 1.0). Glucose WNL. Trop 200s stable. CBC with  leukocytosis to 12.2. Repeat VBG on vapotherm showed worsening of acidosis now at 7.148, and hypercapnic worsened to 92. Placed on bipap and admitted for further management of acute hypercapnic respiratory failure.     Note, reached out to pt's point of contact and son, Wilmer, along with other family members regarding code status on admission. Pt was discharged as full code on 3/21 and sent to hospice. Wanted to clarify goals of care, however could not reach son twice. Per ED documentation pt wished to be Limited x4.     ROS   Review of Systems   Reason unable to perform ROS: obtunded.   Constitutional:  Negative for chills and fever.   Respiratory:  Positive for shortness of breath.    Cardiovascular:  Negative for chest pain, palpitations and leg swelling.   Musculoskeletal:  Positive for back pain.   Psychiatric/Behavioral:  Positive for agitation.        Objective     Vital Signs:  Patient Vitals for the past 8 hrs:   BP Temp Temp src Pulse Resp SpO2 Weight   04/12/24 0758 (!) 165/80 97.6 °F (36.4 °C) Oral (!) 107 20 96 % --   04/12/24 0555 -- -- -- (!) 102 -- 94 % --   04/12/24 0355 (!) 150/67 97.8 °F (36.6 °C) Axillary 99 18 91 % 90.1 kg (198 lb 10.2 oz)   04/12/24 0135 -- -- -- 73 -- 94 % --         Physical Exam  Constitutional:       General: He is not in acute distress.     Appearance: He is ill-appearing.      Comments: Elderly male. Alert and oriented x2.    HENT:      Head: Normocephalic and atraumatic.      Mouth/Throat:      Mouth: Mucous membranes are dry.   Eyes:      General: Scleral icterus present.   Cardiovascular:      Rate and Rhythm: Normal rate and regular rhythm.   Pulmonary:      Effort: No respiratory distress.      Breath sounds: Rhonchi and rales present.      Comments: On bipap  Abdominal:      Comments: protuberant   Musculoskeletal:      Right lower leg: Edema present.      Left lower leg: Edema present.   Skin:     General: Skin is dry.      Comments: Erythema to LLE   Neurological:

## 2024-04-12 NOTE — PROGRESS NOTES
Palliative Medicine Progress Note    Admit Date: 4/9/2024  Hospital Day:  Hospital Day: 4     CC: Altered Mental Status and Shortness of Breath   HPI: Tushar Block is a 83 y.o. male with PMH of CAD s/p CABG, chronic indwelling schneider catheter, pAfib, GERD, and muscle spasticity (on Baclofen pump)  who presented with altered mental status and shortness of breath. EMS noted him to be hypoxic to 70s on room air and he was placed on NRB.      Recent hospitalization 3/16/24 - 3/21/24 for sepsis. 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 Previous cultures have grown MDR Marganella and Enterococcus. Given increased oxygen requirements, and pt's recent AMS suspected that pt likely had aspiration event leading to PNA. Placed on broad spectrum abx and given IVF. Antihypertensives were held. Linezolid (3/17-3/20) - MRSA neg. Zosyn (3/17-3/21). Switched to Augmentin on discharge to be taken for 3/21 - 3/23.     Pt's son Wilmer called, he will be at the hospital around 1130 today to meet.     Met with pt's son/HCPOA Wilmer in the conference room. We discussed code status in depth. He does not want pt to undergo resuscitation if his heart stops or intubation if he is unable to breathe on his own. We discussed DNRCCA vs DNRCC, Wilmer is agreeable to DNRCC. Signed portable [place] DNRCC form and placed on chart. We discussed that while pt seems to be improving this time he is very likely to aspirate again in the future. We discussed the role of hospice care when he aspirates again. Wilmer requested to speak with [facility] again. I reached out to ALPHONSO Mullins with [facility] who will meet with Wilmer today.    D/w Dr. Bellamy and Dr. Castro.        Recommendations:     1. Goals of Care/Advanced Care planning/Code status: DNRCC, updated today after discussing with pt's son/HCPOA Wilmer. Wilmer will meet with [facility] today to discuss re-enrolling with their services. Pt has a HCPOA on file naming  acute distress.     Appearance: He is ill-appearing.   Cardiovascular:      Rate and Rhythm: Normal rate and regular rhythm.      Heart sounds: Normal heart sounds.   Pulmonary:      Breath sounds: Rhonchi present.   Abdominal:      General: Bowel sounds are normal.      Palpations: Abdomen is soft.   Musculoskeletal:      Right lower leg: No edema.      Left lower leg: No edema.   Skin:     General: Skin is warm and dry.   Neurological:      Mental Status: He is disoriented.          WBC/Hgb/Hct/Plts:  17.7/9.3/30.8/298 (04/12 0819)           Assessment:     Principal Problem:    Acute hypoxemic respiratory failure (HCC)  Active Problems:    Pneumonia of both lower lobes due to infectious organism    Acute on chronic respiratory failure with hypoxia and hypercapnia (HCC)  Resolved Problems:    * No resolved hospital problems. *      Time spent with patient and/or family: 30  Time reviewing records: 5  Time communicating with providers: 10    A total of 45 minutes spent with the patient and family on unit greater than 50% face to face time in counseling regarding palliative care and goals of care for the patient.     Adelia Chand APRN  Inpatient Palliative Care  [phone]

## 2024-04-12 NOTE — PROGRESS NOTES
Pt discharged in stable condition on 5L o2 with oxygen sats in low to mid 90s. Attempted to call report to [facility], reception unable to reach receiving nurse. Left my number with reception to give that nurse.

## 2024-04-12 NOTE — PROGRESS NOTES
Podiatric Surgery Daily Progress Note  Tushar Block      Subjective :   Patient seen and examined this am at the bedside.  Patient much more oriented today and able to participate in conversation with appropriate responses.  Patient continues to request no Prevalon boots as he states they irritate his feet. Patient denies N/V/F/C/SOB. Patient denies calf pain, thigh pain, chest pain.     Review of Systems: A 12 point review of symptoms is unremarkable with the exception of the chief complaint. Patient specifically denies nausea, fever, vomiting, chills, shortness of breath, chest pain, abdominal pain, constipation or difficulty urinating.       Objective     BP (!) 150/67   Pulse (!) 102   Temp 97.8 °F (36.6 °C) (Axillary)   Resp 18   Ht 1.829 m (6')   Wt 90.1 kg (198 lb 10.2 oz)   SpO2 94%   BMI 26.94 kg/m²      I/O:  Intake/Output Summary (Last 24 hours) at 4/12/2024 0730  Last data filed at 4/12/2024 0010  Gross per 24 hour   Intake 575.03 ml   Output 875 ml   Net -299.97 ml              Wt Readings from Last 3 Encounters:   04/12/24 90.1 kg (198 lb 10.2 oz)   03/21/24 95.6 kg (210 lb 12.2 oz)   03/06/24 95.6 kg (210 lb 12.2 oz)       LABS:    Recent Labs     04/10/24  0616 04/11/24  0647   WBC 17.3* 16.7*   HGB 9.7* 9.1*   HCT 31.8* 29.4*    259        Recent Labs     04/11/24  0647   *   K 4.4   *   CO2 28   BUN 63*   CREATININE 0.8        Recent Labs     04/09/24  1414   PROT 8.1           LOWER EXTREMITY EXAMINATION    Dressing to bilateral LE intact. No strikethrough noted to the external dressing.  Moderate drainage noted to the internal layers of the dressing.     VASCULAR: DP and PT pulses are weakly palpable.  Previous hand-held Doppler examination revealed DP/PT pulses were biphasic bilaterally CFT is brisk to the digits of the foot b/l. Skin temperature is warm to cool from proximal to distal with no focal calor noted.  +1 pitting edema bilateral lower extremities, notably

## 2024-04-12 NOTE — PLAN OF CARE

## 2024-04-12 NOTE — PROGRESS NOTES
Pulmonary Progress Note        CC Empyema    History Obtained From:  patient    Interval History:     Tushar continues on 6 L of oxygen  He is not complaining of dyspnea at rest  He has not used BiPAP in over 24 hours  Not as audibly congested as he was 2 days ago  I changed his diet to regular at his request after discussing with his power of  and son Jourdan who is in agreement with allowing Tushar to have pleasure feeds in the form of a regular diet  It appears that his diet has been changed back to dysphagia minced and moist -unsure why    HISTORY OF PRESENT ILLNESS:  83 M with PMH of osteomyelitis, recurrent MDRO infections, CAD s/p CABG, chronic indwelling Iglesias catheter presenting to the ED for AMS SOB.  Per nursing report to EMS patient noted to be more altered and having increased work of breathing starting earlier today.  EMS noted him to be hypoxic to 70s on room air, was placed on nonrebreather.     On initial presentation to the ED patient was tachypneic in the 20s, tachycardic 200s, mildly hypertensive to 140/62.  VBG showed pH 7.252/76.6.  Patient was placed on Vapotherm.CBC showed leukocytosis 1222.  Patient was placed on Vapotherm showed worsening acidosis.  Then placed on BiPAP and admitted.    Of note patient was seen by Dr. Celeste during hospitalization in January 2024 for loculated pleural effusion.  It was thought that this left lower lobe lesion was likely rounded atelectasis, this was present dating back to 2017.  Bronchoscopy was done with biopsies showing respiratory Mucosa with nonspecific reactive inflammatory changes.      Review of Systems   Constitutional:  Negative for chills and fever.   Respiratory:  Positive for cough and shortness of breath.      Physical Exam  Constitutional:       Appearance: Normal appearance.   HENT:      Head: Normocephalic and atraumatic.   Cardiovascular:      Rate and Rhythm: Regular rhythm. Tachycardia present.   Pulmonary:      Effort: Pulmonary          XR CHEST PORTABLE   Final Result   1.  Pulmonary opacities are present in the right lung concerning for pneumonia.              ASSESSMENT AND PLAN:    Acute on chronic hypoxic and hypercapnic respiratory failure likely secondary to aspiration  pneumonia  Dysphagia  Spastic neuromuscular disease  Recurrent UTI  Hospice patient    CT scans showing bilateral multi focal pneumonia and consolidation in bilateral lower lobes with suspected aspiration pneumonia  -can use BiPAP prn   - wean O2 to keep sats >88%  -Continue Zosyn  -Continue Pulmicort, Brovana  -Continue albuterol and 3% saline neb q4h with metaneb to clear secretions  -Yesterday Tushar insisted on a regular diet.  I discussed this with his POA and son Jourdan who is in favor of letting him eat for pleasure and not placing him on a restrictive diet.  Both patient and POA are aware of risks of aspiration and still desire a regular diet. Yesterday was changed to regular but subsequently was changed back to minced and moist for an unknown reason.  I discussed this with Adelia from palliative care who is working closely with the patient, family and hospitalist service and will clarify the reason  He is DNR CC with plans to transfer back to nursing facility with [facility]     Will sign off.  Please call with any questions or concerns      Gonzalo Bellamy MD

## 2024-04-12 NOTE — CARE COORDINATION
2:34 PM    Pt will be a readmission and will require a readmission assessment if admitted.       Electronically signed by ARIC Velazquez, ARIELW on 4/9/2024 at 2:34 PM  [phone]    
Meets the following criteria 1) unable to get out of bed without assistance or ambulate, 2) unable to safely sit up in a wheelchair, 3) unable to maintain erect seating position in a chair for time needed for transport and Third party assistance/ attendant required to apply, administer or regulate oxygen during transport  Name of Transport Company: [employer]  Phone: [phone]  Time of Transport: [id number]    Transport form completed: Yes- per  hospice    Referrals made at DISCHARGE for outpatient continued care:  Not Applicable    Additional CM Notes:     CM noted DC order- spoke with hospice RN, Susanna- states POA is agreeable with DNR and hospice care, consents have been signed with [facility]. Transport set up by Susanna for [phone] via CMT. No further CM needs.      COVID Result:    Lab Results   Component Value Date/Time    COVID19 NOT DETECTED 04/09/2024 02:30 PM       The Plan for Transition of Care is related to the following treatment goals of Pneumonia of right lower lobe due to infectious organism [J18.9]  Acute hypoxemic respiratory failure (HCC) [J96.01]  Sepsis without acute organ dysfunction, due to unspecified organism (HCC) [A41.9]    The Patient and/or patient representative Tushar and his family were provided with a choice of provider and agrees with the discharge plan Yes    Freedom of choice list was provided with basic dialogue that supports the patient's individualized plan of care/goals and shares the quality data associated with the providers. Yes    Care Transitions patient: No    January Vail  [facility]  Case Management Department  Ph: [phone]

## 2024-04-12 NOTE — DISCHARGE SUMMARY
INTERNAL MEDICINE DEPARTMENT  DISCHARGE SUMMARY    Patient ID: Tushar Block                                             Discharge Date: 4/12/2024   Patient's PCP: Mario Alberto Tran                                          Discharge Physician: Ksenia Hoover DO   Admit Date: 4/9/2024   Admitting Physician: Christian Jay MD    PROBLEMS DURING HOSPITALIZATION:  Present on Admission:   Acute hypoxemic respiratory failure (HCC)   Pneumonia of both lower lobes due to infectious organism   Acute on chronic respiratory failure with hypoxia and hypercapnia (HCC)      DISCHARGE DIAGNOSES:  Acute on chronic hypoxic, hypercapnic respiratory failure, improved   Aspiration pneumonia  Acute metabolic encephalopathy, resolved  Azotemia, improved  Dysphagia   UTI; recurrent     Hospital Course:  4 days  Mr Block is an 82 yo M with a PMHx of CAD s/p CABG, chronic indwelling schneider catheter, pAfib, GERD, and chronic hypoxic respiratory failure, who presented to ED on 4/9 for AMS and SOB.       Pt was unable to provide any history as he was obtunded. However, he is well known to resident team from recent hospitalization. Last here for similar presentation during 3/16/24 - 3/21/24 for sepsis. 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 Previous cultures have grown MDR Marganella and Enterococcus. Given increased oxygen requirements, and pt's recent AMS suspected that pt likely had aspiration event leading to PNA. Placed on broad spectrum abx and given IVF. Antihypertensives were held. Linezolid (3/17-3/20) - MRSA neg. Zosyn (3/17-3/21). Switched to Augmentin on discharge to be taken for 3/21 - 3/23. Discharged to hospice as full code, with 3L O2 requirements.     On arrival, vitals with tachypnea 20s, tachycardia to 100s, and mildly hypertensive to 140/62. Initial VBG shows acidosis 7.252 with hypercapnia 76.6. He was placed on vapotherm. CXR c/w RLL PNA.  CT chest - Similar to prior with

## 2024-04-12 NOTE — PLAN OF CARE
Problem: Discharge Planning  Goal: Discharge to home or other facility with appropriate resources  Outcome: Progressing  Flowsheets (Taken 4/11/2024 2251)  Discharge to home or other facility with appropriate resources:   Identify barriers to discharge with patient and caregiver   Identify discharge learning needs (meds, wound care, etc)   Arrange for needed discharge resources and transportation as appropriate     Problem: Pain  Goal: Verbalizes/displays adequate comfort level or baseline comfort level  Outcome: Progressing  Flowsheets (Taken 4/10/2024 2159)  Verbalizes/displays adequate comfort level or baseline comfort level:   Encourage patient to monitor pain and request assistance   Assess pain using appropriate pain scale   Administer analgesics based on type and severity of pain and evaluate response   Implement non-pharmacological measures as appropriate and evaluate response   Consider cultural and social influences on pain and pain management   Notify Licensed Independent Practitioner if interventions unsuccessful or patient reports new pain     Problem: Safety - Adult  Goal: Free from fall injury  Outcome: Progressing  Flowsheets (Taken 4/10/2024 2159)  Free From Fall Injury: Based on caregiver fall risk screen, instruct family/caregiver to ask for assistance with transferring infant if caregiver noted to have fall risk factors     Problem: Chronic Conditions and Co-morbidities  Goal: Patient's chronic conditions and co-morbidity symptoms are monitored and maintained or improved  Outcome: Progressing  Problem: Nutrition Deficit:  Goal: Optimize nutritional status  Outcome: Progressing  Flowsheets (Taken 4/11/2024 2251)  Nutrient intake appropriate for improving, restoring, or maintaining nutritional needs: Assess nutritional status and recommend course of action     Care Plan - Patient's Chronic Conditions and Co-Morbidity Symptoms are Monitored and Maintained or Improved:   Monitor and assess

## 2024-04-12 NOTE — DISCHARGE INSTR - COC
Odor None 04/12/24 0600   Usha-wound Assessment Fragile 04/12/24 0600   Margins Defined edges 04/12/24 0600   Number of days: 2       Wound 04/10/24 Sacrum & Buttocks (Active)   Wound Image   04/10/24 1201   Wound Etiology Pressure Stage 3 04/12/24 0600   Dressing Status Old drainage noted 04/12/24 0600   Wound Cleansed Cleansed with saline 04/12/24 0200   Dressing/Treatment Triad hydro/zinc oxide-based hydrophilic paste 04/12/24 0600   Dressing Change Due 04/10/24 04/10/24 1201   Wound Assessment Bleeding;Pink/red 04/12/24 0600   Drainage Amount Small (< 25%) 04/12/24 0600   Drainage Description Sanguinous 04/12/24 0600   Odor None 04/12/24 0600   Usha-wound Assessment Fragile 04/12/24 0600   Margins Defined edges 04/12/24 0600   Number of days: 2        Elimination:  Continence:   Bowel: {YES / NO:19727}  Bladder: {YES / NO:19727}  Urinary Catheter: {Urinary Catheter:118205493}   Colostomy/Ileostomy/Ileal Conduit: {YES / NO:19727}       Date of Last BM: ***    Intake/Output Summary (Last 24 hours) at 4/12/2024 1558  Last data filed at 4/12/2024 0010  Gross per 24 hour   Intake 575.03 ml   Output 875 ml   Net -299.97 ml     I/O last 3 completed shifts:  In: 575 [P.O.:320; I.V.:126.2; IV Piggyback:128.8]  Out: 2100 [Urine:2100]    Safety Concerns:     { AZRA Safety Concerns:662213705}    Impairments/Disabilities:      { AZRA Impairments/Disabilities:788080954}    Nutrition Therapy:  Current Nutrition Therapy:   { AZRA Diet List:985648668}    Routes of Feeding: {CHP DME Other Feedings:520215916}  Liquids: {Slp liquid thickness:46681}  Daily Fluid Restriction: {CHP DME Yes amt example:001007347}  Last Modified Barium Swallow with Video (Video Swallowing Test): {Done Not Done Date:304088012}    Treatments at the Time of Hospital Discharge:   Respiratory Treatments: ***  Oxygen Therapy:  {Therapy; copd oxygen:02336}  Ventilator:    { CC Vent List:756307420}    Rehab Therapies: {THERAPEUTIC

## 2024-04-12 NOTE — DISCHARGE INSTRUCTIONS
Podiatry Wound Care Discharge Instructions  Please perform every day dressing changes to bilateral lower extremity as follows  -Apply saline soaked gauze to the wound on the  right lower leg  -Next apply gauze to the top of the bilateral foot, ankle, and leg  -Next loosely wrap the bilateral lower extremity with Kerlix starting from just in front of the toes and ending just below the knee  -Next gently wrap the bilateral foot with 4 inch Ace bandage starting from just in front of the toes and ending just above the ankle  -Next gently wrap the bilateral leg with 6 inch Ace bandage starting from just above the ankle and ending just below the knee  Patient is non- weightbearing at baselineBilateral lower extremity    Continue to offload the heels with prevalon boots.    Please follow-up with Dr. Fauzia Richardson DPM for wound care

## 2024-04-13 LAB
BACTERIA BLD CULT ORG #2: NORMAL
BACTERIA BLD CULT: NORMAL

## 2024-09-30 NOTE — PLAN OF CARE
Problem: Falls - Risk of:  Goal: Will remain free from falls  Description: Will remain free from falls  Outcome: Ongoing  Note: Pt free from injury or falls at this time, fall precautions in place, bed in low position, side rail up x2, Scherer Fall Risk: Medium (25-44), bed alarm on, reoriented to room and call light, reminded not to get up without assistance. Pt verbalizes understanding of fall risk procedures. Will continue with hourly rounds for PO intake, pain needs, toileting, and repositioning as needed. No needs expressed at this time. Call light in reach, will continue to monitor. Problem: Skin Integrity:  Goal: Will show no infection signs and symptoms  Description: Will show no infection signs and symptoms  Outcome: Ongoing  Note: Pt has scattered bruising present. 2+ BLE edema present. Pt encouraged to elevated legs while in bed. Turn q2, kiana precautions in place. Calmoseptine newly ordered and being used. Will continue to monitor. No

## 2025-04-24 NOTE — PROGRESS NOTES
Biometrics completed.    Results reviewed with a Registered Nurse; understanding of results and   educational materials was verbalized.   Progress Note    Admit Date: 6/28/2022  Diet: ADULT DIET; Regular; Low Fat/Low Chol/High Fiber/2 gm Na  CC: dysuria    Interval history:   NAEON. Pt was seen at bedside today AM, resting comfortably. Does not have any new complaints. Endorses appetite. Having normal BM. Denies F/C, N/V, CP, SOB, HA, vision changes, weakness in arms and feet. VSS  Labs reviewed    Medications:     Scheduled Meds:   aspirin  81 mg Oral Daily    atorvastatin  80 mg Oral Nightly    gabapentin  600 mg Oral BID    levothyroxine  75 mcg Oral QAM AC    pantoprazole  40 mg Oral QAM AC    tamsulosin  0.8 mg Oral Daily    sodium chloride flush  5-40 mL IntraVENous 2 times per day    enoxaparin  30 mg SubCUTAneous BID    traZODone  25 mg Oral Nightly    cefepime  2,000 mg IntraVENous Q12H    vancomycin  750 mg IntraVENous Q12H    nystatin   Topical BID     Continuous Infusions:   sodium chloride       PRN Meds:albuterol, sodium chloride flush, sodium chloride, ondansetron **OR** ondansetron, polyethylene glycol, acetaminophen **OR** acetaminophen, melatonin    Objective:   Vitals:   T-max:  Patient Vitals for the past 8 hrs:   BP Temp Temp src Pulse Resp SpO2   06/30/22 0413 (!) 153/73 97.7 °F (36.5 °C) Oral 59 18 94 %       Intake/Output Summary (Last 24 hours) at 6/30/2022 0754  Last data filed at 6/30/2022 0413  Gross per 24 hour   Intake 480 ml   Output 1235 ml   Net -755 ml       Review of Systems  As above    Physical Exam  Constitutional:       General: He is not in acute distress. Appearance: Normal appearance. He is normal weight. He is not ill-appearing, toxic-appearing or diaphoretic. Comments: Generally unkempt. Dry flaky skin   Eyes:      Pupils: Pupils are equal, round, and reactive to light. Cardiovascular:      Rate and Rhythm: Normal rate and regular rhythm. Pulses: Normal pulses. Heart sounds: Normal heart sounds. No murmur heard.       Pulmonary:      Effort: Pulmonary effort is normal. No respiratory distress. Breath sounds: Normal breath sounds. Abdominal:      General: Bowel sounds are normal. There is no distension. Palpations: Abdomen is soft. Tenderness: There is no abdominal tenderness. There is no guarding. Musculoskeletal:         General: No swelling. Comments: B/l LE wrapped   Neurological:      Mental Status: He is alert and oriented to person, place, and time. Mental status is at baseline. LABS:  CBC:   Recent Labs     06/28/22 2149 06/30/22  0641   WBC 7.2 5.8   HGB 11.4* 10.3*   HCT 35.9* 31.7*    146   MCV 96.0 94.2     Renal:    Recent Labs     06/28/22 2149 06/28/22  2334 06/29/22  0616 06/30/22  0641     --  142 137   K 5.1 4.6 4.3 4.5   *  --  112* 107   CO2 21  --  22 22   BUN 24*  --  23* 26*   CREATININE 0.7*  --  0.6* 0.7*   GLUCOSE 111*  --  114* 114*   CALCIUM 8.4  --  8.3 8.4   MG  --   --  1.70* 1.90   ANIONGAP 10  --  8 8     Hepatic: No results for input(s): AST, ALT, BILITOT, BILIDIR, PROT, LABALBU, ALKPHOS in the last 72 hours. Troponin: No results for input(s): TROPONINI in the last 72 hours. BNP: No results for input(s): BNP in the last 72 hours. Lipids: No results for input(s): CHOL, HDL in the last 72 hours. Invalid input(s): LDLCALCU, TRIGLYCERIDE  ABGs:  No results for input(s): PHART, PJL8SYU, PO2ART, INC7VGW, BEART, THGBART, R7VHSNPQ, JVL5OJK in the last 72 hours. INR: No results for input(s): INR in the last 72 hours. Lactate: No results for input(s): LACTATE in the last 72 hours. Cultures:  -----------------------------------------------------------------  RAD:   XR FOOT LEFT (MIN 3 VIEWS)   Final Result      1. No acute osseous findings. 2.  Post partial 5th ray and dictation.         Assessment/Plan:     Dysuria 2/2 acute complicated cystitis  Hx of MDRO UTI  Chronic indwelling schneider 2/2 neurogenic bladder  H/O Proteus MDRO UTI 4/21, had multiple UTI with E. Fecalis, proteus, pseudomonas, candida in the past   Chronic indwelling catheter since 2019 for neurogenic bladder  UA with 10-20 WBC, positive nitrite, moderate LE and 4+ bacteria    -On vanc and cefepime  -pending urine Cx  -Pt does not appear septic; afebrile, HR and BP wnl     Lower extremity wounds   Left MT osteomyelitis s/p L partial ray resection  MRSA +ve wound cx on 05/25/22   B/L LE wrapped in dressings   Was recently admitted 5/25/22 and treated with abx   -Podiatry seeing  -wound care  -Does not look infected      Chronic medical problems   CAD s/p CABG in 2013   -continue home aspirin 81 mg daily   -continue lipitor 80 mg nightly      Hypothyroidism   -continue home synthyroid 75 mcg      BPH  -continue home flomax 0.8 mg PO daily   -chronic indwelling foleys catheter      Mood disorders   -continue home trazodone 25 mg PO nightly       Code Status:Full code  FEN: ADULT DIET;  Regular; Low Fat/Low Chol/High Fiber/2 gm Na  PPX: lovenox   DISPO: Adebayo Luke MD, PGY-1  06/30/22  7:54 AM    This patient will be staffed and discussed with Paras Jonas MD.
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
## (undated) DEVICE — KIT DRSG SM W12.5XH3.2XL18CM VAC SH DRP PD W/ CONN DISP RUL

## (undated) DEVICE — CONMED PEDIATRIC GASTROSCOPE SHEATHED CYTOLOGY BRUSH, RING HANDLE, 3 MM X 160 CM: Brand: CONMED

## (undated) DEVICE — BANDAGE COMPR M W4INXL10YD WHT BGE VELC E MTRX HK AND LOOP

## (undated) DEVICE — SINGLE-USE BIOPSY FORCEPS: Brand: RADIAL JAW 4

## (undated) DEVICE — TOWEL,OR,DSP,ST,BLUE,DLX,8/PK,10PK/CS: Brand: MEDLINE

## (undated) DEVICE — FORCEPS BX L240CM JAW DIA2.4MM ORNG L CAP W/ NDL DISP RAD

## (undated) DEVICE — SOLUTION IV 1000ML 0.9% SOD CHL

## (undated) DEVICE — PREMIUM WET SKIN PREP TRAY: Brand: MEDLINE INDUSTRIES, INC.

## (undated) DEVICE — SURGIFOAM SPNG SZ 100

## (undated) DEVICE — DRESSING PETRO W3XL8IN OIL EMUL N ADH GZ KNIT IMPREG CELOS

## (undated) DEVICE — SUTURE ETHLN SZ 3-0 L18IN NONABSORBABLE BLK PS-2 L19MM 3/8 1669H

## (undated) DEVICE — UNDERGLOVE SURG SZ 8 BLU LTX FREE SYN POLYISOPRENE POLYMER

## (undated) DEVICE — TRAP SPEC 80ML MUCUS

## (undated) DEVICE — E-Z CLEAN, NON-STICK, PTFE COATED, ELECTROSURGICAL BLADE ELECTRODE, MODIFIED EXTENDED INSULATION, 2.5 INCH (6.35 CM): Brand: MEGADYNE

## (undated) DEVICE — COVERALL SURG UNIV POLYPR SLVLSS CUF STYL FASTENING TIE W/O

## (undated) DEVICE — SOLUTION IRRIG 3000ML 0.9% SOD CHL USP UROMATIC PLAS CONT

## (undated) DEVICE — ZIMMER® STERILE DISPOSABLE TOURNIQUET CUFF, DUAL PORT, SINGLE BLADDER, 18 IN. (46 CM)

## (undated) DEVICE — GLOVE SURG SZ 9 THK91MIL LTX FREE SYN POLYISOPRENE ANTI

## (undated) DEVICE — Z DISCONTINUED USE 2749457 TUBING SAMP AD W12.5XH8.4IN D9.1IN NSL ORAL SMRT CAPNOLINE

## (undated) DEVICE — SUTURE VCRL SZ 3-0 L27IN ABSRB UD L26MM CT-2 1/2 CIR J232H

## (undated) DEVICE — COVER LT HNDL BLU PLAS

## (undated) DEVICE — SWAB CULTURET AMIES DBL

## (undated) DEVICE — GOWN,SIRUS,POLYRNF,BRTHSLV,XL,30/CS: Brand: MEDLINE

## (undated) DEVICE — MEDICINE CUP, GRADUATED, STER: Brand: MEDLINE

## (undated) DEVICE — BAG DRNGE 2000ML UROLOGY ANTI REFLX CHMBR SAMP PRT

## (undated) DEVICE — GAUZE,SPONGE,4"X4",16PLY,XRAY,STRL,LF: Brand: MEDLINE

## (undated) DEVICE — CATHETER,FOLEY,SILI-ELAST,LTX,18FR,10ML: Brand: MEDLINE

## (undated) DEVICE — GENERAL: Brand: MEDLINE INDUSTRIES, INC.

## (undated) DEVICE — GARMENT,MEDLINE,DVT,INT,CALF,MED, GEN2: Brand: MEDLINE

## (undated) DEVICE — Device

## (undated) DEVICE — GLOVE ORANGE PI 7 1/2   MSG9075

## (undated) DEVICE — SPONGE,LAP,18"X18",DLX,XR,ST,5/PK,40/PK: Brand: MEDLINE

## (undated) DEVICE — SURE SET-DOUBLE BASIN-LF: Brand: MEDLINE INDUSTRIES, INC.

## (undated) DEVICE — DERMATOME BLADES: Brand: DERMATOME

## (undated) DEVICE — PODIATRY: Brand: MEDLINE INDUSTRIES, INC.

## (undated) DEVICE — SUTURE CHROMIC GUT SZ 4-0 L27IN ABSRB BRN L17MM RB-1 1/2 U203H

## (undated) DEVICE — CAUTERY TIP POLISHER: Brand: DEVON

## (undated) DEVICE — GOWN,SIRUS,POLYRNF,SETINSLV,L,20/CS: Brand: MEDLINE

## (undated) DEVICE — CANISTER NEG PRSS 500ML WHT SENSATRAC TBNG CLMP CONN

## (undated) DEVICE — STAPLER SKIN H3.9MM WIRE DIA0.58MM CRWN 6.9MM 35 STPL ROT

## (undated) DEVICE — NEEDLE ASPIR TRNSBRONCH 22 GA CYTO WANG

## (undated) DEVICE — BASIC SINGLE BASIN 1-LF: Brand: MEDLINE INDUSTRIES, INC.

## (undated) DEVICE — TOWEL,STOP FLAG GOLD N-W: Brand: MEDLINE

## (undated) DEVICE — HANDPIECE SET WITH SUCTION TUBING: Brand: INTERPULSE

## (undated) DEVICE — GLOVE SURG SZ 8 L12IN FNGR THK79MIL GRN LTX FREE

## (undated) DEVICE — GAUZE,PACKING STRIP,IODOFORM,1/2"X5YD,ST: Brand: CURAD

## (undated) DEVICE — FORCEPS BX L240CM JAW DIA2.8MM L CAP W/ NDL MIC MESH TOOTH

## (undated) DEVICE — 450 ML BOTTLE OF 0.05% CHLORHEXIDINE GLUCONATE IN 99.95% STERILE WATER FOR IRRIGATION, USP AND APPLICATOR.: Brand: IRRISEPT ANTIMICROBIAL WOUND LAVAGE

## (undated) DEVICE — ADAPTER MON OD15X22MM ID15MM STD LUER FIT W/ LIFESAVER

## (undated) DEVICE — TUBING IRRIG L77IN DIA0.241IN L BOR FOR CYSTO W/ NVENT

## (undated) DEVICE — GLOVE SURG SZ 75 L12IN FNGR THK94MIL WHT POLYMER LTX BEAD

## (undated) DEVICE — SOLUTION IV IRRIG WATER 1000ML POUR BRL 2F7114

## (undated) DEVICE — SUTURE VCRL SZ 3-0 L27IN ABSRB UD L26MM SH 1/2 CIR J416H

## (undated) DEVICE — SUTURE NONABSORBABLE MONOFILAMENT 4-0 FS-2 18 IN ETHILON 662H

## (undated) DEVICE — TRAY PREP DRY W/ PREM GLV 2 APPL 6 SPNG 2 UNDPD 1 OVERWRAP

## (undated) DEVICE — CANNULA SAMP CO2 AD GRN 7FT CO2 AND 7FT O2 TBNG UNIV CONN

## (undated) DEVICE — COAXIAL HIGH FLOW TIP WITH SOFT SHIELD

## (undated) DEVICE — THIN OFFSET (9.0 X 0.38 X 25.0MM)

## (undated) DEVICE — HANDPIECE SUCTION TUBING INTERPULSE 10FT

## (undated) DEVICE — HIGH FLOW TIP

## (undated) DEVICE — DRESSING,GAUZE,XEROFORM,CURAD,5"X9",ST: Brand: CURAD

## (undated) DEVICE — PREP TRAY 10X5X2: Brand: MEDLINE INDUSTRIES, INC.

## (undated) DEVICE — DRAINBAG,ANTI-REFLUX TOWER,L/F,2000ML,LL: Brand: MEDLINE

## (undated) DEVICE — ELECTROSURGICAL PENCIL ROCKER SWITCH NON COATED BLADE ELECTRODE 10 FT (3 M) CORD HOLSTER: Brand: MEGADYNE

## (undated) DEVICE — PREVENA PLUS 125 THERAPY UNITPREVENA PLUS 125 THERAPY UNIT WITH PREVENA PLUS 150ML CANISTER: Brand: PREVENA PLUS™